# Patient Record
Sex: FEMALE | Race: WHITE | NOT HISPANIC OR LATINO | Employment: OTHER | URBAN - METROPOLITAN AREA
[De-identification: names, ages, dates, MRNs, and addresses within clinical notes are randomized per-mention and may not be internally consistent; named-entity substitution may affect disease eponyms.]

---

## 2017-01-09 ENCOUNTER — TELEPHONE (OUTPATIENT)
Dept: FAMILY MEDICINE | Facility: CLINIC | Age: 82
End: 2017-01-09

## 2017-01-09 DIAGNOSIS — M71.9 BURSITIS: Primary | ICD-10-CM

## 2017-01-09 DIAGNOSIS — G62.9 NEUROPATHY: ICD-10-CM

## 2017-01-10 RX ORDER — ACETAMINOPHEN AND CODEINE PHOSPHATE 300; 30 MG/1; MG/1
1 TABLET ORAL EVERY 8 HOURS PRN
Qty: 270 TABLET | Refills: 0 | Status: SHIPPED | OUTPATIENT
Start: 2017-01-10 | End: 2017-03-31

## 2017-01-10 RX ORDER — GABAPENTIN 300 MG/1
300 CAPSULE ORAL 3 TIMES DAILY
Qty: 180 CAPSULE | Refills: 1 | Status: SHIPPED | OUTPATIENT
Start: 2017-01-10 | End: 2017-05-10

## 2017-01-13 ENCOUNTER — OFFICE VISIT (OUTPATIENT)
Dept: FAMILY MEDICINE | Facility: CLINIC | Age: 82
End: 2017-01-13
Payer: MEDICARE

## 2017-01-13 VITALS
WEIGHT: 135.8 LBS | OXYGEN SATURATION: 98 % | DIASTOLIC BLOOD PRESSURE: 89 MMHG | BODY MASS INDEX: 23.18 KG/M2 | TEMPERATURE: 97.4 F | HEIGHT: 64 IN | SYSTOLIC BLOOD PRESSURE: 136 MMHG | HEART RATE: 65 BPM | RESPIRATION RATE: 15 BRPM

## 2017-01-13 DIAGNOSIS — R73.09 OTHER ABNORMAL GLUCOSE: ICD-10-CM

## 2017-01-13 DIAGNOSIS — I10 ESSENTIAL HYPERTENSION: ICD-10-CM

## 2017-01-13 DIAGNOSIS — Z01.818 PREOP GENERAL PHYSICAL EXAM: Primary | ICD-10-CM

## 2017-01-13 DIAGNOSIS — F41.1 GENERALIZED ANXIETY DISORDER: ICD-10-CM

## 2017-01-13 DIAGNOSIS — G47.00 INSOMNIA, UNSPECIFIED TYPE: ICD-10-CM

## 2017-01-13 DIAGNOSIS — E78.5 HYPERLIPIDEMIA LDL GOAL <130: ICD-10-CM

## 2017-01-13 LAB — HBA1C MFR BLD: 5.3 % (ref 4.3–6)

## 2017-01-13 PROCEDURE — 83036 HEMOGLOBIN GLYCOSYLATED A1C: CPT | Performed by: PHYSICIAN ASSISTANT

## 2017-01-13 PROCEDURE — 80061 LIPID PANEL: CPT | Performed by: PHYSICIAN ASSISTANT

## 2017-01-13 PROCEDURE — 80053 COMPREHEN METABOLIC PANEL: CPT | Performed by: PHYSICIAN ASSISTANT

## 2017-01-13 PROCEDURE — 99215 OFFICE O/P EST HI 40 MIN: CPT | Performed by: PHYSICIAN ASSISTANT

## 2017-01-13 PROCEDURE — 36415 COLL VENOUS BLD VENIPUNCTURE: CPT | Performed by: PHYSICIAN ASSISTANT

## 2017-01-13 RX ORDER — CLONIDINE HYDROCHLORIDE 0.1 MG/1
0.1 TABLET ORAL 2 TIMES DAILY
Qty: 180 TABLET | Refills: 2 | Status: SHIPPED | OUTPATIENT
Start: 2017-01-13 | End: 2017-11-20

## 2017-01-13 RX ORDER — LISINOPRIL 40 MG/1
40 TABLET ORAL DAILY
Qty: 90 TABLET | Refills: 1 | Status: SHIPPED | OUTPATIENT
Start: 2017-01-13 | End: 2017-07-13

## 2017-01-13 RX ORDER — TRAZODONE HYDROCHLORIDE 50 MG/1
50 TABLET, FILM COATED ORAL
Qty: 30 TABLET | Refills: 1 | Status: ON HOLD | OUTPATIENT
Start: 2017-01-13 | End: 2017-05-28

## 2017-01-13 ASSESSMENT — ANXIETY QUESTIONNAIRES
GAD7 TOTAL SCORE: 0
3. WORRYING TOO MUCH ABOUT DIFFERENT THINGS: NOT AT ALL
5. BEING SO RESTLESS THAT IT IS HARD TO SIT STILL: NOT AT ALL
IF YOU CHECKED OFF ANY PROBLEMS ON THIS QUESTIONNAIRE, HOW DIFFICULT HAVE THESE PROBLEMS MADE IT FOR YOU TO DO YOUR WORK, TAKE CARE OF THINGS AT HOME, OR GET ALONG WITH OTHER PEOPLE: NOT DIFFICULT AT ALL
6. BECOMING EASILY ANNOYED OR IRRITABLE: NOT AT ALL
2. NOT BEING ABLE TO STOP OR CONTROL WORRYING: NOT AT ALL
1. FEELING NERVOUS, ANXIOUS, OR ON EDGE: NOT AT ALL
7. FEELING AFRAID AS IF SOMETHING AWFUL MIGHT HAPPEN: NOT AT ALL

## 2017-01-13 ASSESSMENT — PATIENT HEALTH QUESTIONNAIRE - PHQ9: 5. POOR APPETITE OR OVEREATING: NOT AT ALL

## 2017-01-13 NOTE — Clinical Note
[facility]  [id number] [place] [id number] [phone]      January 17, 2017      Daya Ignacio  [address] [facility]    [facility] [address]          Dear Daya,    Terrell cholesterol, hemoglobin A1c (average blood sugars over 3 months, check for diabetes), kidney function, electrolytes, and liver enzymes are normal.     Results for orders placed or performed in visit on 01/13/17   Lipid Profile with reflex to direct LDL   Result Value Ref Range    Cholesterol 186 <200 mg/dL    Triglycerides 94 <150 mg/dL    HDL Cholesterol 57 >49 mg/dL    LDL Cholesterol Calculated 110 (H) <100 mg/dL    Non HDL Cholesterol 129 <130 mg/dL   Comprehensive metabolic panel   Result Value Ref Range    Sodium 137 133 - 144 mmol/L    Potassium 3.4 3.4 - 5.3 mmol/L    Chloride 102 94 - 109 mmol/L    Carbon Dioxide 28 20 - 32 mmol/L    Anion Gap 7 3 - 14 mmol/L    Glucose 106 (H) 70 - 99 mg/dL    Urea Nitrogen 17 7 - 30 mg/dL    Creatinine 0.71 0.52 - 1.04 mg/dL    GFR Estimate 78 >60 mL/min/1.7m2    GFR Estimate If Black >90   GFR Calc   >60 mL/min/1.7m2    Calcium 9.6 8.5 - 10.1 mg/dL    Bilirubin Total 1.4 (H) 0.2 - 1.3 mg/dL    Albumin 4.2 3.4 - 5.0 g/dL    Protein Total 7.8 6.8 - 8.8 g/dL    Alkaline Phosphatase 64 40 - 150 U/L    ALT 14 0 - 50 U/L    AST 10 0 - 45 U/L   Hemoglobin A1c   Result Value Ref Range    Hemoglobin A1C 5.3 4.3 - 6.0 %       Sincerely,  Marcy Soares PA-C

## 2017-01-13 NOTE — MR AVS SNAPSHOT
After Visit Summary   1/13/2017    Daya Ignacio    MRN: [id number]           Patient Information     Date Of Birth          10/11/1930        Visit Information        Provider Department      1/13/2017 10:00 AM Marcy Soares PA-C [facility]        Today's Diagnoses     Preop general physical exam    -  1       Care Instructions      Before Your Surgery      Call your surgeon if there is any change in your health. This includes signs of a cold or flu (such as a sore throat, runny nose, cough, rash or fever).    Do not smoke, drink alcohol or take over the counter medicine (unless your surgeon or primary care doctor tells you to) for the 24 hours before and after surgery.    If you take prescribed drugs: Follow your doctor s orders about which medicines to take and which to stop until after surgery.    Eating and drinking prior to surgery: follow the instructions from your surgeon    Take a shower or bath the night before surgery. Use the soap your surgeon gave you to gently clean your skin. If you do not have soap from your surgeon, use your regular soap. Do not shave or scrub the surgery site.  Wear clean pajamas and have clean sheets on your bed.         Follow-ups after your visit        Who to contact     If you have questions or need follow up information about today's clinic visit or your schedule please contact [facility] directly at [phone].  Normal or non-critical lab and imaging results will be communicated to you by MyChart, letter or phone within 4 business days after the clinic has received the results. If you do not hear from us within 7 days, please contact the clinic through MyChart or phone. If you have a critical or abnormal lab result, we will notify you by phone as soon as possible.  Submit refill requests through [email or web address] or call your pharmacy and they will forward the refill request to us. Please allow 3 business days for your  "refill to be completed.          Additional Information About Your Visit        [employer] Information     [employer] lets you send messages to your doctor, view your test results, renew your prescriptions, schedule appointments and more. To sign up, go to [email or web address] . Click on \"Log in\" on the left side of the screen, which will take you to the Welcome page. Then click on \"Sign up Now\" on the right side of the page.     You will be asked to enter the access code listed below, as well as some personal information. Please follow the directions to create your username and password.     Your access code is: [email or web address]  Expires: 2017 10:03 AM     Your access code will  in 90 days. If you need help or a new code, please call your [place] clinic or [phone].        Care EveryWhere ID     This is your [facility] EveryWhere ID. This could be used by other organizations to access your [place] medical records  [id number]        Your Vitals Were     Pulse Temperature Respirations Height BMI (Body Mass Index) Pulse Oximetry    65 97.4  F (36.3  C) (Oral) 15 5' 4\" (1.626 m) 23.30 kg/m2 98%    Breastfeeding?                   No            Blood Pressure from Last 3 Encounters:   17 136/89   16 139/72   16 130/70    Weight from Last 3 Encounters:   17 135 lb 12.8 oz (61.598 kg)   16 139 lb 12.8 oz (63.413 kg)   16 143 lb (64.864 kg)              Today, you had the following     No orders found for display         Today's Medication Changes          These changes are accurate as of: 17 10:03 AM.  If you have any questions, ask your nurse or doctor.               Stop taking these medicines if you haven't already. Please contact your care team if you have questions.     ciprofloxacin 250 MG tablet   Commonly known as:  CIPRO   Stopped by:  Marcy Soares PA-C           tiZANidine 4 MG tablet   Commonly known as:  ZANAFLEX   Stopped by:  Marcy Soares, " FARZANA           traMADol 50 MG tablet   Commonly known as:  ULTRAM   Stopped by:  Marcy Soares PA-C                    Primary Care Provider Office Phone # Fax #    Marcy Soares PA-C [phone]       [facility] [address]  [facility] [address]        Thank you!     Thank you for choosing [facility]  for your care. Our goal is always to provide you with excellent care. Hearing back from our patients is one way we can continue to improve our services. Please take a few minutes to complete the written survey that you may receive in the mail after your visit with us. Thank you!             Your Updated Medication List - Protect others around you: Learn how to safely use, store and throw away your medicines at www.disposemymeds.org.          This list is accurate as of: 1/13/17 10:03 AM.  Always use your most recent med list.                   Brand Name Dispense Instructions for use    acetaminophen-codeine 300-30 MG per tablet    TYLENOL/codeine #3    270 tablet    Take 1 tablet by mouth every 8 hours as needed for mild pain       ascorbic acid 500 MG tablet    VITAMIN C    100 Tab    take 1 Tab by mouth daily.       cloNIDine 0.1 MG tablet    CATAPRES    180 tablet    Take 1 tablet (0.1 mg) by mouth 2 times daily       conjugated estrogens cream    PREMARIN    30 g    Place 0.5 g vaginally three times a week       estradiol 0.1 MG/GM cream    ESTRACE VAGINAL    42.5 g    Place 2 g vaginally three times a week       fish oil-omega-3 fatty acids 1000 MG capsule     180 Cap    take 2 Caps by mouth daily.       fluocinolone 0.01 % solution    SYNALAR         gabapentin 300 MG capsule    NEURONTIN    180 capsule    Take 1 capsule (300 mg) by mouth 3 times daily       lisinopril 40 MG tablet    PRINIVIL/ZESTRIL    90 tablet    Take 1 tablet (40 mg) by mouth daily       * MEDS UNK - RECORDS REQUESTED      Pressavision - ONE TABLET TWICE DAILY        metoprolol 100 MG 24 hr tablet    TOPROL-XL    90 tablet    Take 1 tablet (100 mg) by mouth daily       * order for DME     1 Device    1 Device daily.       * order for DME     1 Device    4 wheel walker with seat       sertraline 50 MG tablet    ZOLOFT    90 tablet    Take 1 tablet (50 mg) by mouth daily       vitamin D 1000 UNITS capsule     100 Cap    take 1 Cap by mouth daily.       * Notice:  This list has 3 medication(s) that are the same as other medications prescribed for you. Read the directions carefully, and ask your doctor or other care provider to review them with you.

## 2017-01-13 NOTE — PROGRESS NOTES
SUBJECTIVE:                                                    Daya Ignacio is a 86 year old female who presents to clinic today for the following health issues:      Hyperlipidemia Follow-Up      Rate your low fat/cholesterol diet?: good    Taking statin?  No    Other lipid medications/supplements?:  none     Hypertension Follow-up      Outpatient blood pressures are not being checked.    Low Salt Diet: low salt         Diet: regular (no restrictions)  Insomnia      Duration: 2 weeks    Description  Frequency of insomnia:  nightly  Time to fall asleep: 2 hours  Middle of night awakening:  occasionally  Early morning awakening:  occasionally    Accompanying signs and symptoms:  none    History  Similar episodes in past:  YES  Previous evaluation/sleep study:  no     Precipitating or alleviating factors:  New stressful situation: no   Caffeine intake after lunchtime: no   OTC decongestants: no   Any new medications: no     Therapies tried and outcome: caffeine avoidance, Benadryl -  not effective and Advil pm -  not effective           Problem list and histories reviewed & adjusted, as indicated.  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     Hypertension goal BP (blood pressure) < 140/90     Chronic pain syndrome     Past Surgical History   Procedure Laterality Date     C nonspecific procedure       lt hip pain       Social History   Substance Use Topics     Smoking status: Never Smoker      Smokeless tobacco: Never Used     Alcohol Use: No     Family History   Problem Relation Age of Onset     Alzheimer Disease Mother      mild     Cardiovascular Father      heart attack     Neurologic Disorder Brother 83     Parkinson's  "          ROS:  Constitutional, HEENT, cardiovascular, pulmonary, GI, , musculoskeletal, neuro, skin, endocrine and psych systems are negative, except as otherwise noted.    OBJECTIVE:                                                    /89 mmHg  Pulse 65  Temp(Src) 97.4  F (36.3  C) (Oral)  Resp 15  Ht 5' 4\" (1.626 m)  Wt 135 lb 12.8 oz (61.598 kg)  BMI 23.30 kg/m2  SpO2 98%  Breastfeeding? No  Body mass index is 23.3 kg/(m^2).  GENERAL APPEARANCE: healthy, alert and no distress  HENT: ear canals and TM's normal and nose and mouth without ulcers or lesions  RESP: lungs clear to auscultation - no rales, rhonchi or wheezes  CV: regular rates and rhythm, normal S1 S2, no S3 or S4 and no murmur, click or rub  ABDOMEN: soft, nontender, without hepatosplenomegaly or masses and bowel sounds normal  PSYCH: mentation appears normal and affect normal/bright         ASSESSMENT/PLAN:                                                            1. Preop general physical exam      2. Insomnia, unspecified type  Trial of trazodone as needed   - traZODone (DESYREL) 50 MG tablet; Take 1 tablet (50 mg) by mouth nightly as needed for sleep  Dispense: 30 tablet; Refill: 1    3. Hyperlipidemia LDL goal <130    - Lipid Profile with reflex to direct LDL    4. Essential hypertension  Stable.   - Comprehensive metabolic panel  - Hemoglobin A1c  - lisinopril (PRINIVIL/ZESTRIL) 40 MG tablet; Take 1 tablet (40 mg) by mouth daily  Dispense: 90 tablet; Refill: 1  - cloNIDine (CATAPRES) 0.1 MG tablet; Take 1 tablet (0.1 mg) by mouth 2 times daily  Dispense: 180 tablet; Refill: 2    5. Other abnormal glucose     - Hemoglobin A1c    6. GENERALIZED ANXIETY DIS  Stable.   - sertraline (ZOLOFT) 50 MG tablet; Take 1 tablet (50 mg) by mouth daily  Dispense: 90 tablet; Refill: 1        Marcy Soares PA-C, FARZANA  [facility]"

## 2017-01-13 NOTE — PROGRESS NOTES
[facility]  [address] [facility] [address]  [phone]  Dept: [phone]    PRE-OP EVALUATION:  Today's date: 2017    Daya Ignacio (: 10/11/1930) presents for pre-operative evaluation assessment as requested by Dr. Poole.  She requires evaluation and anesthesia risk assessment prior to undergoing surgery/procedure for treatment of Left eye .  Proposed procedure: Cataract, left eye.     Date of Surgery/ Procedure: 2017  Time of Surgery/ Procedure: [facility]/Surgical Facility: [facility]  Fax number for surgical facility: [phone] [facility] Phone: [phone]  Primary Physician: Marcy Soares  Type of Anesthesia Anticipated: to be determined    Patient has a Health Care Directive or Living Will:  NO    1. NO - Do you have a history of heart attack, stroke, stent, bypass or surgery on an artery in the head, neck, heart or legs?  2. NO - Do you ever have any pain or discomfort in your chest?  3. NO - Do you have a history of  Heart Failure?  4. NO - Are you troubled by shortness of breath when: walking on the level, up a slight hill or at night?  5. YES - DO YOU CURRENTLY HAVE A COLD, BRONCHITIS OR OTHER RESPIRATORY INFECTION? Cold  6. NO - Do you have a cough, shortness of breath or wheezing?  7. YES - DO YOU SOMETIMES GET PAINS IN THE CALVES OF YOUR LEGS WHEN YOU WALK?   8. NO - Do you or anyone in your family have previous history of blood clots?  9. NO - Do you or does anyone in your family have a serious bleeding problem such as prolonged bleeding following surgeries or cuts?  10. NO - Have you ever had problems with anemia or been told to take iron pills?  11. NO - Have you had any abnormal blood loss such as black, tarry or bloody stools, or abnormal vaginal bleeding?  12. YES - HAVE YOU EVER HAD A BLOOD TRANSFUSION?6 years ago with hip replacement   13. NO - Have you or any of your relatives  ever had problems with anesthesia?  14. NO - Do you have sleep apnea, excessive snoring or daytime drowsiness?  15. NO - Do you have any prosthetic heart valves?  16. YES - DO YOU HAVE PROSTHETIC JOINTS? Hip  17. NO - Is there any chance that you may be pregnant?      HPI:                                                      Brief HPI related to upcoming procedure: left cataract      HYPERTENSION - Patient has longstanding history of mod-severe HTN , currently denies any symptoms referable to elevated blood pressure. Specifically denies chest pain, palpitations, dyspnea, orthopnea, PND or peripheral edema. Blood pressure readings have been in normal range. Current medication regimen is as listed below. Patient denies any side effects of medication.                                                                                                                                                                                          .    MEDICAL HISTORY:                                                      Patient Active Problem List    Diagnosis Date Noted     Chronic pain syndrome 12/02/2015     Priority: Medium      completed 6/2/16.        Hypertension goal BP (blood pressure) < 140/90 09/25/2015     Priority: Medium     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 04/16/2014     Priority: Medium     HTN (hypertension) 09/24/2012     Vitamin D deficiency 09/04/2012     Advanced directives, counseling/discussion 06/17/2011     Advance Directive Problem List Overview:   Name Relationship Phone    Primary Health Care Agent            Alternative Health Care Agent          Patient states has Advance Directive and will bring in a copy to clinic. 6/17/2011        Hip fracture (H) 02/28/2011     HYPERLIPIDEMIA LDL GOAL <130 10/31/2010     Osteoporosis 09/10/2010     Sciatica 09/10/2010     Generalized anxiety disorder 07/09/2007     describes chronic low lever anxiety/depression        Past Medical History   Diagnosis  Date     Closed fracture of unspecified part of vertebral column without mention of spinal cord injury      from fall summer 06     Elevated blood pressure reading without diagnosis of hypertension      Closed fracture of unspecified part of neck of femur      Macular degeneration (senile) of retina, unspecified      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 4/16/2014     Past Surgical History   Procedure Laterality Date     C nonspecific procedure       lt hip pain     Current Outpatient Prescriptions   Medication Sig Dispense Refill     acetaminophen-codeine (TYLENOL/CODEINE #3) 300-30 MG per tablet Take 1 tablet by mouth every 8 hours as needed for mild pain 270 tablet 0     gabapentin (NEURONTIN) 300 MG capsule Take 1 capsule (300 mg) by mouth 3 times daily 180 capsule 1     metoprolol (TOPROL-XL) 100 MG 24 hr tablet Take 1 tablet (100 mg) by mouth daily 90 tablet 1     estradiol (ESTRACE VAGINAL) 0.1 MG/GM vaginal cream Place 2 g vaginally three times a week 42.5 g 3     sertraline (ZOLOFT) 50 MG tablet Take 1 tablet (50 mg) by mouth daily 90 tablet 1     lisinopril (PRINIVIL,ZESTRIL) 40 MG tablet Take 1 tablet (40 mg) by mouth daily 90 tablet 1     cloNIDine (CATAPRES) 0.1 MG tablet Take 1 tablet (0.1 mg) by mouth 2 times daily 180 tablet 2     traMADol (ULTRAM) 50 MG tablet Take 1 tablet (50 mg) by mouth every 6 hours as needed for moderate pain 30 tablet 0     ciprofloxacin (CIPRO) 250 MG tablet        conjugated estrogens (PREMARIN) vaginal cream Place 0.5 g vaginally three times a week 30 g 6     tiZANidine (ZANAFLEX) 4 MG tablet Take 1 tablet (4 mg) by mouth nightly as needed for muscle spasms 10 tablet 0     fluocinolone (SYNALAR) 0.01 % external solution        ORDER FOR DME 4 wheel walker with seat 1 Device o     ORDER FOR DME 1 Device daily. 1 Device 0     ascorbic acid (VITAMIN C) 500 MG tablet take 1 Tab by mouth daily. 100 Tab 12     Cholecalciferol (VITAMIN D) 1000 UNIT capsule take 1 Cap by  "mouth daily. 100 Cap 12     fish oil-omega-3 fatty acids 1000 MG capsule take 2 Caps by mouth daily. 180 Cap 12     MEDS UNK - RECORDS REQUESTED Pressavision - ONE TABLET TWICE DAILY       OTC products: None, except as noted above    Allergies   Allergen Reactions     Macrobid [Nitrofurantoin Anhydrous] Other (See Comments)     Body aches     Penicillins      Sulfa Drugs       Latex Allergy: NO    Social History   Substance Use Topics     Smoking status: Never Smoker      Smokeless tobacco: Never Used     Alcohol Use: No     History   Drug Use No       REVIEW OF SYSTEMS:                                                    C: NEGATIVE for fever, chills, change in weight  E/M: NEGATIVE for ear, mouth and throat problems  R: NEGATIVE for significant cough or SOB  CV: NEGATIVE for chest pain, palpitations or peripheral edema    EXAM:                                                    /89 mmHg  Pulse 65  Temp(Src) 97.4  F (36.3  C) (Oral)  Resp 15  Ht 5' 4\" (1.626 m)  Wt 135 lb 12.8 oz (61.598 kg)  BMI 23.30 kg/m2  SpO2 98%  Breastfeeding? No  GENERAL APPEARANCE: healthy, alert and no distress  HENT: ear canals and TM's normal and nose and mouth without ulcers or lesions  RESP: lungs clear to auscultation - no rales, rhonchi or wheezes  CV: regular rate and rhythm, normal S1 S2, no S3 or S4 and no murmur, click or rub   ABDOMEN: soft, nontender, no HSM or masses and bowel sounds normal  NEURO: Normal strength and tone, sensory exam grossly normal, mentation intact and speech normal    DIAGNOSTICS:                                                        Recent Labs   Lab Test  01/22/16   1002  09/11/15   1023  08/08/15   0920   08/23/13   0958   06/27/11   0625   06/26/11   0533   HGB   --   12.7   --    --   14.1   < >  9.4*   --   7.9*   PLT   --   150   --    --   159   < >  102*   --   80*   INR   --    --    --    --    --    --   1.21*   --   1.30*   NA  142  141   --    < >  142   < >   --    --    --  "   POTASSIUM  4.2  3.9   --    < >  4.2   < >  3.5   < >   --    CR  0.65  0.60   --    < >  0.58   < >   --    --    --    A1C   --    --   5.2   --    --    --    --    --    --     < > = values in this interval not displayed.        IMPRESSION:                                                    Reason for surgery/procedure: left cataract  Diagnosis/reason for consult: preop    The proposed surgical procedure is considered LOW risk.    REVISED CARDIAC RISK INDEX  The patient has the following serious cardiovascular risks for perioperative complications such as (MI, PE, VFib and 3  AV Block):  No serious cardiac risks  INTERPRETATION: 0 risks: Class I (very low risk - 0.4% complication rate)    The patient has the following additional risks for perioperative complications:  No identified additional risks    No diagnosis found.    RECOMMENDATIONS:                                                          --Patient is to take all blood pressure medications on the day of surgery     APPROVAL GIVEN to proceed with proposed procedure, without further diagnostic evaluation       Signed Electronically by: Marcy Soares PA-C, FARZANA    Copy of this evaluation report is provided to requesting physicianClaudia Gale Preop Guidelines

## 2017-01-14 LAB
ALBUMIN SERPL-MCNC: 4.2 G/DL (ref 3.4–5)
ALP SERPL-CCNC: 64 U/L (ref 40–150)
ALT SERPL W P-5'-P-CCNC: 14 U/L (ref 0–50)
ANION GAP SERPL CALCULATED.3IONS-SCNC: 7 MMOL/L (ref 3–14)
AST SERPL W P-5'-P-CCNC: 10 U/L (ref 0–45)
BILIRUB SERPL-MCNC: 1.4 MG/DL (ref 0.2–1.3)
BUN SERPL-MCNC: 17 MG/DL (ref 7–30)
CALCIUM SERPL-MCNC: 9.6 MG/DL (ref 8.5–10.1)
CHLORIDE SERPL-SCNC: 102 MMOL/L (ref 94–109)
CHOLEST SERPL-MCNC: 186 MG/DL
CO2 SERPL-SCNC: 28 MMOL/L (ref 20–32)
CREAT SERPL-MCNC: 0.71 MG/DL (ref 0.52–1.04)
GFR SERPL CREATININE-BSD FRML MDRD: 78 ML/MIN/1.7M2
GLUCOSE SERPL-MCNC: 106 MG/DL (ref 70–99)
HDLC SERPL-MCNC: 57 MG/DL
LDLC SERPL CALC-MCNC: 110 MG/DL
NONHDLC SERPL-MCNC: 129 MG/DL
POTASSIUM SERPL-SCNC: 3.4 MMOL/L (ref 3.4–5.3)
PROT SERPL-MCNC: 7.8 G/DL (ref 6.8–8.8)
SODIUM SERPL-SCNC: 137 MMOL/L (ref 133–144)
TRIGL SERPL-MCNC: 94 MG/DL

## 2017-01-14 ASSESSMENT — PATIENT HEALTH QUESTIONNAIRE - PHQ9: SUM OF ALL RESPONSES TO PHQ QUESTIONS 1-9: 2

## 2017-01-14 ASSESSMENT — ANXIETY QUESTIONNAIRES: GAD7 TOTAL SCORE: 0

## 2017-03-09 ENCOUNTER — TELEPHONE (OUTPATIENT)
Dept: FAMILY MEDICINE | Facility: CLINIC | Age: 82
End: 2017-03-09

## 2017-03-09 DIAGNOSIS — G89.29 CHRONIC PAIN: ICD-10-CM

## 2017-03-31 ENCOUNTER — TELEPHONE (OUTPATIENT)
Dept: FAMILY MEDICINE | Facility: CLINIC | Age: 82
End: 2017-03-31

## 2017-03-31 DIAGNOSIS — M71.9 BURSITIS: ICD-10-CM

## 2017-03-31 RX ORDER — ACETAMINOPHEN AND CODEINE PHOSPHATE 300; 30 MG/1; MG/1
1 TABLET ORAL EVERY 8 HOURS PRN
Qty: 270 TABLET | Refills: 0 | Status: ON HOLD | OUTPATIENT
Start: 2017-03-31 | End: 2017-06-21

## 2017-03-31 NOTE — TELEPHONE ENCOUNTER
Controlled Substance Refill Request for 01/10/17  Problem List Complete:  Yes    Edited:  Juliano Pena, RN  3/10/2017  Patient is followed by Marcy Soares PA-C, FARZANA for ongoing prescription of pain medication. All refills should only be approved by this provider, or covering partner.     Medication(s): acetaminophen-codeine (TYLENOL/CODEINE #3) 300-30 MG per tablet   Maximum quantity per month: 270  Clinic visit frequency required: Q 6 months      Controlled substance agreement:      Encounter-Level CSA:      There are no encounter-level csa.       Pain Clinic evaluation in the past: No     DIRE Total Score(s):  No flowsheet data found.     Last [facility] website verification: done on 3/10/2017  [email or web address]              checked in past 6 months?  Yes 03/10/17     Thank you,  Deandra Bourgeois  [facility]  [phone]

## 2017-04-14 ENCOUNTER — TRANSFERRED RECORDS (OUTPATIENT)
Dept: HEALTH INFORMATION MANAGEMENT | Facility: CLINIC | Age: 82
End: 2017-04-14

## 2017-05-10 DIAGNOSIS — G62.9 NEUROPATHY: ICD-10-CM

## 2017-05-10 RX ORDER — GABAPENTIN 300 MG/1
CAPSULE ORAL
Qty: 180 CAPSULE | Refills: 1 | Status: SHIPPED | OUTPATIENT
Start: 2017-05-10 | End: 2017-09-22

## 2017-05-10 NOTE — TELEPHONE ENCOUNTER
gabapentin (NEURONTIN) 300 MG capsule      Sig: Take 1 capsule (300 mg) by mouth 3 times daily    Last Written Prescription Date: 1/10/17  Last Quantity: 180, # refills: 1  Last Office Visit with [facility] or [facility] prescribing provider: 1/13/2017       Creatinine   Date Value Ref Range Status   01/13/2017 0.71 0.52 - 1.04 mg/dL Final     Lab Results   Component Value Date    AST 10 01/13/2017     Lab Results   Component Value Date    ALT 14 01/13/2017     BP Readings from Last 3 Encounters:   01/13/17 136/89   07/08/16 139/72   01/22/16 130/70         Routing refill request to provider for review/approval because:  Drug not on the [facility], [facility] or [facility] refill protocol or controlled substance    LUIS ANTONIO Logan  May 10, 2017  1:24 PM

## 2017-05-28 ENCOUNTER — APPOINTMENT (OUTPATIENT)
Dept: GENERAL RADIOLOGY | Facility: CLINIC | Age: 82
DRG: 281 | End: 2017-05-28
Attending: EMERGENCY MEDICINE
Payer: MEDICARE

## 2017-05-28 ENCOUNTER — HOSPITAL ENCOUNTER (INPATIENT)
Facility: CLINIC | Age: 82
LOS: 4 days | Discharge: NURSING, REHAB OR LONG-TERM CARE | DRG: 281 | End: 2017-06-01
Attending: EMERGENCY MEDICINE | Admitting: INTERNAL MEDICINE
Payer: MEDICARE

## 2017-05-28 ENCOUNTER — APPOINTMENT (OUTPATIENT)
Dept: CT IMAGING | Facility: CLINIC | Age: 82
DRG: 281 | End: 2017-05-28
Attending: EMERGENCY MEDICINE
Payer: MEDICARE

## 2017-05-28 DIAGNOSIS — I25.10 ASHD (ARTERIOSCLEROTIC HEART DISEASE): ICD-10-CM
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
## 2017-05-28 DIAGNOSIS — I21.4 NON-STEMI (NON-ST ELEVATED MYOCARDIAL INFARCTION) (H): ICD-10-CM

## 2017-05-28 DIAGNOSIS — I10 HYPERTENSION GOAL BP (BLOOD PRESSURE) < 140/90: Primary | ICD-10-CM

## 2017-05-28 DIAGNOSIS — N30.00 ACUTE CYSTITIS WITHOUT HEMATURIA: ICD-10-CM

## 2017-05-28 DIAGNOSIS — N30.01 ACUTE CYSTITIS WITH HEMATURIA: ICD-10-CM

## 2017-05-28 DIAGNOSIS — E87.6 HYPOKALEMIA: ICD-10-CM

## 2017-05-28 LAB
ALBUMIN SERPL-MCNC: 3.6 G/DL (ref 3.4–5)
ALBUMIN UR-MCNC: 30 MG/DL
ALP SERPL-CCNC: 69 U/L (ref 40–150)
ALT SERPL W P-5'-P-CCNC: 18 U/L (ref 0–50)
ANION GAP SERPL CALCULATED.3IONS-SCNC: 9 MMOL/L (ref 3–14)
APPEARANCE UR: ABNORMAL
AST SERPL W P-5'-P-CCNC: 33 U/L (ref 0–45)
BACTERIA #/AREA URNS HPF: ABNORMAL /HPF
BASOPHILS # BLD AUTO: 0 10E9/L (ref 0–0.2)
BASOPHILS NFR BLD AUTO: 0.1 %
BILIRUB SERPL-MCNC: 1.6 MG/DL (ref 0.2–1.3)
BILIRUB UR QL STRIP: NEGATIVE
BUN SERPL-MCNC: 17 MG/DL (ref 7–30)
CALCIUM SERPL-MCNC: 9 MG/DL (ref 8.5–10.1)
CHLORIDE SERPL-SCNC: 100 MMOL/L (ref 94–109)
CO2 SERPL-SCNC: 27 MMOL/L (ref 20–32)
COLOR UR AUTO: YELLOW
CREAT SERPL-MCNC: 0.6 MG/DL (ref 0.52–1.04)
DIFFERENTIAL METHOD BLD: ABNORMAL
EOSINOPHIL # BLD AUTO: 0 10E9/L (ref 0–0.7)
EOSINOPHIL NFR BLD AUTO: 0 %
ERYTHROCYTE [DISTWIDTH] IN BLOOD BY AUTOMATED COUNT: 13.9 % (ref 10–15)
GFR SERPL CREATININE-BSD FRML MDRD: ABNORMAL ML/MIN/1.7M2
GLUCOSE SERPL-MCNC: 114 MG/DL (ref 70–99)
GLUCOSE UR STRIP-MCNC: NEGATIVE MG/DL
HCT VFR BLD AUTO: 41.6 % (ref 35–47)
HGB BLD-MCNC: 13.8 G/DL (ref 11.7–15.7)
HGB UR QL STRIP: ABNORMAL
IMM GRANULOCYTES # BLD: 0.1 10E9/L (ref 0–0.4)
IMM GRANULOCYTES NFR BLD: 0.8 %
KETONES UR STRIP-MCNC: 5 MG/DL
LACTATE BLD-SCNC: 1.3 MMOL/L (ref 0.7–2.1)
LEUKOCYTE ESTERASE UR QL STRIP: ABNORMAL
LMWH PPP CHRO-ACNC: 0.25 IU/ML
LYMPHOCYTES # BLD AUTO: 0.5 10E9/L (ref 0.8–5.3)
LYMPHOCYTES NFR BLD AUTO: 4.6 %
MAGNESIUM SERPL-MCNC: 1.9 MG/DL (ref 1.6–2.3)
MCH RBC QN AUTO: 30.1 PG (ref 26.5–33)
MCHC RBC AUTO-ENTMCNC: 33.2 G/DL (ref 31.5–36.5)
MCV RBC AUTO: 91 FL (ref 78–100)
MONOCYTES # BLD AUTO: 0.7 10E9/L (ref 0–1.3)
MONOCYTES NFR BLD AUTO: 5.9 %
MUCOUS THREADS #/AREA URNS LPF: PRESENT /LPF
NEUTROPHILS # BLD AUTO: 9.7 10E9/L (ref 1.6–8.3)
NEUTROPHILS NFR BLD AUTO: 88.6 %
NITRATE UR QL: POSITIVE
NRBC # BLD AUTO: 0 10*3/UL
NRBC BLD AUTO-RTO: 0 /100
PH UR STRIP: 5 PH (ref 5–7)
PLATELET # BLD AUTO: 114 10E9/L (ref 150–450)
POTASSIUM SERPL-SCNC: 3.1 MMOL/L (ref 3.4–5.3)
POTASSIUM SERPL-SCNC: 3.6 MMOL/L (ref 3.4–5.3)
PROCALCITONIN SERPL-MCNC: 3.17 NG/ML
PROT SERPL-MCNC: 7.3 G/DL (ref 6.8–8.8)
RBC # BLD AUTO: 4.58 10E12/L (ref 3.8–5.2)
RBC #/AREA URNS AUTO: 17 /HPF (ref 0–2)
SODIUM SERPL-SCNC: 136 MMOL/L (ref 133–144)
SP GR UR STRIP: 1.01 (ref 1–1.03)
SQUAMOUS #/AREA URNS AUTO: 1 /HPF (ref 0–1)
TROPONIN I SERPL-MCNC: 2.99 UG/L (ref 0–0.04)
TROPONIN I SERPL-MCNC: 3.79 UG/L (ref 0–0.04)
TROPONIN I SERPL-MCNC: 3.87 UG/L (ref 0–0.04)
URN SPEC COLLECT METH UR: ABNORMAL
UROBILINOGEN UR STRIP-MCNC: 0 MG/DL (ref 0–2)
WBC # BLD AUTO: 11 10E9/L (ref 4–11)
WBC #/AREA URNS AUTO: >182 /HPF (ref 0–2)

## 2017-05-28 PROCEDURE — 25000132 ZZH RX MED GY IP 250 OP 250 PS 637: Mod: GY | Performed by: EMERGENCY MEDICINE

## 2017-05-28 PROCEDURE — 83605 ASSAY OF LACTIC ACID: CPT | Performed by: EMERGENCY MEDICINE

## 2017-05-28 PROCEDURE — 85025 COMPLETE CBC W/AUTO DIFF WBC: CPT | Performed by: EMERGENCY MEDICINE

## 2017-05-28 PROCEDURE — A9270 NON-COVERED ITEM OR SERVICE: HCPCS | Mod: GY | Performed by: EMERGENCY MEDICINE

## 2017-05-28 PROCEDURE — 87086 URINE CULTURE/COLONY COUNT: CPT | Performed by: EMERGENCY MEDICINE

## 2017-05-28 PROCEDURE — 87040 BLOOD CULTURE FOR BACTERIA: CPT | Performed by: INTERNAL MEDICINE

## 2017-05-28 PROCEDURE — 84484 ASSAY OF TROPONIN QUANT: CPT | Performed by: EMERGENCY MEDICINE

## 2017-05-28 PROCEDURE — 25000125 ZZHC RX 250: Performed by: INTERNAL MEDICINE

## 2017-05-28 PROCEDURE — 85520 HEPARIN ASSAY: CPT | Performed by: INTERNAL MEDICINE

## 2017-05-28 PROCEDURE — 83735 ASSAY OF MAGNESIUM: CPT | Performed by: EMERGENCY MEDICINE

## 2017-05-28 PROCEDURE — 96376 TX/PRO/DX INJ SAME DRUG ADON: CPT

## 2017-05-28 PROCEDURE — 96361 HYDRATE IV INFUSION ADD-ON: CPT

## 2017-05-28 PROCEDURE — 25000128 H RX IP 250 OP 636

## 2017-05-28 PROCEDURE — 96365 THER/PROPH/DIAG IV INF INIT: CPT

## 2017-05-28 PROCEDURE — 12000007 ZZH R&B INTERMEDIATE

## 2017-05-28 PROCEDURE — 36415 COLL VENOUS BLD VENIPUNCTURE: CPT | Performed by: INTERNAL MEDICINE

## 2017-05-28 PROCEDURE — 25000128 H RX IP 250 OP 636: Performed by: EMERGENCY MEDICINE

## 2017-05-28 PROCEDURE — 93005 ELECTROCARDIOGRAM TRACING: CPT

## 2017-05-28 PROCEDURE — 96375 TX/PRO/DX INJ NEW DRUG ADDON: CPT

## 2017-05-28 PROCEDURE — 84484 ASSAY OF TROPONIN QUANT: CPT | Performed by: INTERNAL MEDICINE

## 2017-05-28 PROCEDURE — 25000128 H RX IP 250 OP 636: Performed by: INTERNAL MEDICINE

## 2017-05-28 PROCEDURE — A9270 NON-COVERED ITEM OR SERVICE: HCPCS | Mod: GY | Performed by: INTERNAL MEDICINE

## 2017-05-28 PROCEDURE — 72125 CT NECK SPINE W/O DYE: CPT

## 2017-05-28 PROCEDURE — 87186 SC STD MICRODIL/AGAR DIL: CPT | Performed by: EMERGENCY MEDICINE

## 2017-05-28 PROCEDURE — 25000132 ZZH RX MED GY IP 250 OP 250 PS 637: Mod: GY | Performed by: INTERNAL MEDICINE

## 2017-05-28 PROCEDURE — 80053 COMPREHEN METABOLIC PANEL: CPT | Performed by: EMERGENCY MEDICINE

## 2017-05-28 PROCEDURE — 87088 URINE BACTERIA CULTURE: CPT | Performed by: EMERGENCY MEDICINE

## 2017-05-28 PROCEDURE — 99223 1ST HOSP IP/OBS HIGH 75: CPT | Mod: AI | Performed by: INTERNAL MEDICINE

## 2017-05-28 PROCEDURE — 81001 URINALYSIS AUTO W/SCOPE: CPT | Performed by: EMERGENCY MEDICINE

## 2017-05-28 PROCEDURE — 84145 PROCALCITONIN (PCT): CPT | Performed by: INTERNAL MEDICINE

## 2017-05-28 PROCEDURE — 71020 XR CHEST 2 VW: CPT

## 2017-05-28 PROCEDURE — 70450 CT HEAD/BRAIN W/O DYE: CPT

## 2017-05-28 PROCEDURE — 84145 PROCALCITONIN (PCT): CPT | Performed by: EMERGENCY MEDICINE

## 2017-05-28 PROCEDURE — 99285 EMERGENCY DEPT VISIT HI MDM: CPT | Mod: 25

## 2017-05-28 PROCEDURE — 36415 COLL VENOUS BLD VENIPUNCTURE: CPT | Performed by: EMERGENCY MEDICINE

## 2017-05-28 PROCEDURE — 84132 ASSAY OF SERUM POTASSIUM: CPT | Performed by: INTERNAL MEDICINE

## 2017-05-28 RX ORDER — METOPROLOL SUCCINATE 100 MG/1
100 TABLET, EXTENDED RELEASE ORAL DAILY
Status: DISCONTINUED | OUTPATIENT
Start: 2017-05-28 | End: 2017-05-30

## 2017-05-28 RX ORDER — POTASSIUM CHLORIDE 7.45 MG/ML
10 INJECTION INTRAVENOUS
Status: DISCONTINUED | OUTPATIENT
Start: 2017-05-28 | End: 2017-06-01 | Stop reason: HOSPADM

## 2017-05-28 RX ORDER — SODIUM CHLORIDE 9 MG/ML
1000 INJECTION, SOLUTION INTRAVENOUS CONTINUOUS
Status: DISCONTINUED | OUTPATIENT
Start: 2017-05-28 | End: 2017-05-28

## 2017-05-28 RX ORDER — CEFTRIAXONE 1 G/1
1 INJECTION, POWDER, FOR SOLUTION INTRAMUSCULAR; INTRAVENOUS EVERY 24 HOURS
Status: DISCONTINUED | OUTPATIENT
Start: 2017-05-28 | End: 2017-06-01 | Stop reason: HOSPADM

## 2017-05-28 RX ORDER — ONDANSETRON 2 MG/ML
4 INJECTION INTRAMUSCULAR; INTRAVENOUS ONCE
Status: COMPLETED | OUTPATIENT
Start: 2017-05-28 | End: 2017-05-28

## 2017-05-28 RX ORDER — CEFDINIR 300 MG/1
300 CAPSULE ORAL ONCE
Status: DISCONTINUED | OUTPATIENT
Start: 2017-05-28 | End: 2017-05-28

## 2017-05-28 RX ORDER — POTASSIUM CHLORIDE 1500 MG/1
20-40 TABLET, EXTENDED RELEASE ORAL
Status: DISCONTINUED | OUTPATIENT
Start: 2017-05-28 | End: 2017-06-01 | Stop reason: HOSPADM

## 2017-05-28 RX ORDER — ONDANSETRON 2 MG/ML
INJECTION INTRAMUSCULAR; INTRAVENOUS
Status: COMPLETED
Start: 2017-05-28 | End: 2017-05-28

## 2017-05-28 RX ORDER — NALOXONE HYDROCHLORIDE 0.4 MG/ML
.1-.4 INJECTION, SOLUTION INTRAMUSCULAR; INTRAVENOUS; SUBCUTANEOUS
Status: DISCONTINUED | OUTPATIENT
Start: 2017-05-28 | End: 2017-06-01 | Stop reason: HOSPADM

## 2017-05-28 RX ORDER — MAGNESIUM SULFATE HEPTAHYDRATE 40 MG/ML
4 INJECTION, SOLUTION INTRAVENOUS EVERY 4 HOURS PRN
Status: DISCONTINUED | OUTPATIENT
Start: 2017-05-28 | End: 2017-06-01 | Stop reason: HOSPADM

## 2017-05-28 RX ORDER — ASPIRIN 81 MG/1
324 TABLET, CHEWABLE ORAL ONCE
Status: COMPLETED | OUTPATIENT
Start: 2017-05-28 | End: 2017-05-28

## 2017-05-28 RX ORDER — ACETAMINOPHEN 325 MG/1
650 TABLET ORAL ONCE
Status: COMPLETED | OUTPATIENT
Start: 2017-05-28 | End: 2017-05-28

## 2017-05-28 RX ORDER — ACETAMINOPHEN 325 MG/1
650 TABLET ORAL EVERY 6 HOURS PRN
Status: DISCONTINUED | OUTPATIENT
Start: 2017-05-28 | End: 2017-06-01 | Stop reason: HOSPADM

## 2017-05-28 RX ORDER — POTASSIUM CHLORIDE 1.5 G/1.58G
40 POWDER, FOR SOLUTION ORAL ONCE
Status: COMPLETED | OUTPATIENT
Start: 2017-05-28 | End: 2017-05-28

## 2017-05-28 RX ORDER — ONDANSETRON 4 MG/1
4 TABLET, ORALLY DISINTEGRATING ORAL EVERY 6 HOURS PRN
Status: DISCONTINUED | OUTPATIENT
Start: 2017-05-28 | End: 2017-06-01 | Stop reason: HOSPADM

## 2017-05-28 RX ORDER — POTASSIUM CL/LIDO/0.9 % NACL 10MEQ/0.1L
10 INTRAVENOUS SOLUTION, PIGGYBACK (ML) INTRAVENOUS
Status: DISCONTINUED | OUTPATIENT
Start: 2017-05-28 | End: 2017-06-01 | Stop reason: HOSPADM

## 2017-05-28 RX ORDER — ISOSORBIDE DINITRATE 10 MG/1
10 TABLET ORAL 3 TIMES DAILY
Status: DISCONTINUED | OUTPATIENT
Start: 2017-05-28 | End: 2017-06-01 | Stop reason: HOSPADM

## 2017-05-28 RX ORDER — POTASSIUM CHLORIDE 29.8 MG/ML
20 INJECTION INTRAVENOUS
Status: DISCONTINUED | OUTPATIENT
Start: 2017-05-28 | End: 2017-06-01 | Stop reason: HOSPADM

## 2017-05-28 RX ORDER — METOPROLOL TARTRATE 1 MG/ML
5 INJECTION, SOLUTION INTRAVENOUS ONCE
Status: DISCONTINUED | OUTPATIENT
Start: 2017-05-28 | End: 2017-05-28

## 2017-05-28 RX ORDER — POTASSIUM CHLORIDE 1.5 G/1.58G
20-40 POWDER, FOR SOLUTION ORAL
Status: DISCONTINUED | OUTPATIENT
Start: 2017-05-28 | End: 2017-06-01 | Stop reason: HOSPADM

## 2017-05-28 RX ORDER — ONDANSETRON 2 MG/ML
4 INJECTION INTRAMUSCULAR; INTRAVENOUS EVERY 6 HOURS PRN
Status: DISCONTINUED | OUTPATIENT
Start: 2017-05-28 | End: 2017-06-01 | Stop reason: HOSPADM

## 2017-05-28 RX ORDER — NITROGLYCERIN 0.4 MG/1
0.4 TABLET SUBLINGUAL EVERY 5 MIN PRN
Status: DISCONTINUED | OUTPATIENT
Start: 2017-05-28 | End: 2017-05-28

## 2017-05-28 RX ADMIN — Medication 3350 UNITS: at 15:39

## 2017-05-28 RX ADMIN — ISOSORBIDE DINITRATE 10 MG: 10 TABLET ORAL at 17:39

## 2017-05-28 RX ADMIN — POTASSIUM CHLORIDE 20 MEQ: 1.5 POWDER, FOR SOLUTION ORAL at 17:39

## 2017-05-28 RX ADMIN — ACETAMINOPHEN 650 MG: 325 TABLET, FILM COATED ORAL at 13:00

## 2017-05-28 RX ADMIN — ONDANSETRON 4 MG: 4 TABLET, ORALLY DISINTEGRATING ORAL at 22:41

## 2017-05-28 RX ADMIN — CEFTRIAXONE SODIUM 1 G: 1 INJECTION, POWDER, FOR SOLUTION INTRAMUSCULAR; INTRAVENOUS at 17:39

## 2017-05-28 RX ADMIN — ISOSORBIDE DINITRATE 10 MG: 10 TABLET ORAL at 22:11

## 2017-05-28 RX ADMIN — NITROGLYCERIN 0.4 MG: 0.4 TABLET SUBLINGUAL at 13:48

## 2017-05-28 RX ADMIN — HEPARIN SODIUM 12 UNITS/KG/HR: 10000 INJECTION, SOLUTION INTRAVENOUS at 15:39

## 2017-05-28 RX ADMIN — ASPIRIN 81 MG CHEWABLE TABLET 324 MG: 81 TABLET CHEWABLE at 14:38

## 2017-05-28 RX ADMIN — SODIUM CHLORIDE 1000 ML: 9 INJECTION, SOLUTION INTRAVENOUS at 12:23

## 2017-05-28 RX ADMIN — ONDANSETRON 4 MG: 2 INJECTION INTRAMUSCULAR; INTRAVENOUS at 12:21

## 2017-05-28 RX ADMIN — METOPROLOL SUCCINATE 100 MG: 100 TABLET, EXTENDED RELEASE ORAL at 17:39

## 2017-05-28 RX ADMIN — POTASSIUM CHLORIDE 40 MEQ: 1.5 POWDER, FOR SOLUTION ORAL at 13:45

## 2017-05-28 ASSESSMENT — ENCOUNTER SYMPTOMS
APPETITE CHANGE: 1
FEVER: 0
NECK PAIN: 1
VOMITING: 1
ABDOMINAL PAIN: 0
DIZZINESS: 0
CHILLS: 0
COUGH: 0

## 2017-05-28 ASSESSMENT — PAIN DESCRIPTION - DESCRIPTORS
DESCRIPTORS: HEADACHE

## 2017-05-28 NOTE — ED NOTES
Patient states that she has been ill the past 24 hours with nausea, frequent vomiting, and chills.  Further patient felt she was weak.  Patient fell in her bathroom this morning at 0900; patient did crawl to her telephone prior to calling the medics.  C-Collar removed by   Dr. Pepper after assessment.  ABCs intact.

## 2017-05-28 NOTE — PHARMACY-ADMISSION MEDICATION HISTORY
Admission medication history interview status for this patient is complete. See [facility] admission navigator for allergy information, prior to admission medications and immunization status.     Medication history interview source(s):Patient  Medication history resources (including written lists, pill bottles, clinic record):None    Changes made to PTA medication list:  Added: none  Deleted: estradiol vaginal cream, trazodone  Changed: none    Actions taken by pharmacist (provider contacted, etc):None     Additional medication history information:None    Medication reconciliation/reorder completed by provider prior to medication history? No    For patients on insulin therapy: no (Yes/No)   Lantus/levemir/NPH/Mix 70/30 dose: _____ in AM/PM or twice daily   Sliding scale Novolog Y/N   If Yes, do you have a baseline novolog pre-meal dose: ______units with meals   Patients eat three meals a day: Y/N   Any Barriers to therapy: cost of medications/comfortable with giving injections (if applicable)/ comfortable and confident with current diabetes regimen       Prior to Admission medications    Medication Sig Last Dose Taking? Auth Provider   gabapentin (NEURONTIN) 300 MG capsule TAKE ONE CAPSULE BY MOUTH THREE TIMES A DAY 5/27/2017 at Unknown time Yes Marcy Soares PA-C   acetaminophen-codeine (TYLENOL/CODEINE #3) 300-30 MG per tablet Take 1 tablet by mouth every 8 hours as needed for mild pain 5/27/2017 at Unknown time Yes Marcy Soares PA-C   sertraline (ZOLOFT) 50 MG tablet Take 1 tablet (50 mg) by mouth daily 5/27/2017 at Unknown time Yes Marcy Soares PA-C   lisinopril (PRINIVIL/ZESTRIL) 40 MG tablet Take 1 tablet (40 mg) by mouth daily 5/27/2017 at Unknown time Yes Marcy Soares PA-C   cloNIDine (CATAPRES) 0.1 MG tablet Take 1 tablet (0.1 mg) by mouth 2 times daily 5/27/2017 at Unknown time Yes Marcy Soares PA-C   metoprolol (TOPROL-XL) 100 MG 24 hr tablet Take 1  tablet (100 mg) by mouth daily 5/27/2017 at Unknown time Yes Marcy Soares PA-C   conjugated estrogens (PREMARIN) vaginal cream Place 0.5 g vaginally three times a week Past Week at fri Yes Juan Manuel Cabalelro MD   ascorbic acid (VITAMIN C) 500 MG tablet take 1 Tab by mouth daily. 5/27/2017 at Unknown time Yes Christie Mancilla MD   Cholecalciferol (VITAMIN D) 1000 UNIT capsule take 1 Cap by mouth daily. 5/27/2017 at Unknown time Yes Christie Mancilla MD   fish oil-omega-3 fatty acids 1000 MG capsule take 2 Caps by mouth daily. 5/27/2017 at Unknown time Yes Christie Mancilla MD

## 2017-05-28 NOTE — IP AVS SNAPSHOT
MRN:[id number]                      After Visit Summary   5/28/2017    Daya Ignacio    MRN: [id number]           Thank you!     Thank you for choosing [facility] for your care. Our goal is always to provide you with excellent care. Hearing back from our patients is one way we can continue to improve our services. Please take a few minutes to complete the written survey that you may receive in the mail after you visit. If you would like to speak to someone directly about your visit please contact Patient Relations at [phone]. Thank you!          Patient Information     Date Of Birth          10/11/1930        Designated Caregiver       Most Recent Value    Caregiver    Will someone help with your care after discharge? no      About your hospital stay     You were admitted on:  May 28, 2017 You last received care in the:  [place] [address] [facility]    You were discharged on:  June 1, 2017        Reason for your hospital stay       UTI and non ST elevation myocardial infarction (type 2)                  Who to Call     For medical emergencies, please call 911.  For non-urgent questions about your medical care, please call your primary care provider or clinic, [phone]          Attending Provider     Provider Specialty    Evelyne Mehta MD Emergency Medicine    Shaan Harden MD Internal Medicine       Primary Care Provider Office Phone # Fax #    Marcy Natalia Soares PA-C [phone]      After Care Instructions     Activity       Your activity upon discharge: activity as tolerated            Diet       Follow this diet upon discharge: Cardiac                  Follow-up Appointments     Follow-up and recommended labs and tests        Follow up with PCP in 1 week                  Additional Services     Follow-Up with Cardiac Advanced Practice Provider           Follow-Up with Cardiologist                 Pending Results     Date and Time Order  "Name Status Description    2017 1656 Blood culture Preliminary             Statement of Approval     Ordered          17 1043  I have reviewed and agree with all the recommendations and orders detailed in this document.  EFFECTIVE NOW     Approved and electronically signed by:  Andre Mcmanus MD             Admission Information     Date & Time Provider Department Dept. Phone    2017 Shaan Harden MD [place] [address] [facility] [phone]      Your Vitals Were     Blood Pressure Pulse Temperature Respirations Height Weight    148/71 (BP Location: Left arm) 91 97.9  F (36.6  C) (Oral) 20 1.626 m (5' 4\") 57.3 kg (126 lb 4.8 oz)    Pulse Oximetry BMI (Body Mass Index)                96% 21.68 kg/m2          MyChart Information     [employer] lets you send messages to your doctor, view your test results, renew your prescriptions, schedule appointments and more. To sign up, go to [email or web address] . Click on \"Log in\" on the left side of the screen, which will take you to the Welcome page. Then click on \"Sign up Now\" on the right side of the page.     You will be asked to enter the access code listed below, as well as some personal information. Please follow the directions to create your username and password.     Your access code is: [email or web address]  Expires: 2017 10:49 AM     Your access code will  in 90 days. If you need help or a new code, please call your [place] [facility] clinic or [phone].        Care EveryWhere ID     This is your Care EveryWhere ID. This could be used by other organizations to access your [facility] medical records  [id number]           Review of your medicines      START taking        Dose / Directions    amLODIPine 10 MG tablet   Commonly known as:  NORVASC   Used for:  Hypertension goal BP (blood pressure) < 140/90   Notes to Patient:  New medication for high blood pressure        Dose:  10 mg   Take 1 tablet (10 mg) by mouth daily   Quantity:  30 tablet "   Refills:  3       aspirin 81 MG EC tablet   Used for:  Non-STEMI (non-ST elevated myocardial infarction) (H)        Dose:  81 mg   Take 1 tablet (81 mg) by mouth daily   Quantity:  30 tablet   Refills:  3       atorvastatin 10 MG tablet   Commonly known as:  LIPITOR   Used for:  Non-STEMI (non-ST elevated myocardial infarction) (H)        Dose:  10 mg   Take 1 tablet (10 mg) by mouth daily   Quantity:  30 tablet   Refills:  3       cefadroxil 500 MG capsule   Commonly known as:  DURICEF   Notes to Patient:  Antibiotic for urinary tract infection        Dose:  500 mg   Take 1 capsule (500 mg) by mouth 2 times daily for 6 days   Quantity:  12 capsule   Refills:  0       isosorbide dinitrate 10 MG tablet   Commonly known as:  ISORDIL   Used for:  Non-STEMI (non-ST elevated myocardial infarction) (H)        Dose:  10 mg   Take 1 tablet (10 mg) by mouth 3 times daily   Quantity:  90 tablet   Refills:  3         CONTINUE these medicines which have NOT CHANGED        Dose / Directions    acetaminophen-codeine 300-30 MG per tablet   Commonly known as:  TYLENOL/codeine #3   Used for:  Bursitis        Dose:  1 tablet   Take 1 tablet by mouth every 8 hours as needed for mild pain   Quantity:  270 tablet   Refills:  0       ascorbic acid 500 MG tablet   Commonly known as:  VITAMIN C   Notes to Patient:  Take as you were prior to admission        Dose:  500 mg   take 1 Tab by mouth daily.   Quantity:  100 Tab   Refills:  12       cloNIDine 0.1 MG tablet   Commonly known as:  CATAPRES   Used for:  Essential hypertension        Dose:  0.1 mg   Take 1 tablet (0.1 mg) by mouth 2 times daily   Quantity:  180 tablet   Refills:  2       conjugated estrogens cream   Commonly known as:  PREMARIN   Used for:  Other urinary problems        Dose:  0.5 g   Place 0.5 g vaginally three times a week   Quantity:  30 g   Refills:  6       fish oil-omega-3 fatty acids 1000 MG capsule        Dose:  2 g   take 2 Caps by mouth daily.   Quantity:   180 Cap   Refills:  12       gabapentin 300 MG capsule   Commonly known as:  NEURONTIN   Used for:  Neuropathy (H)   Notes to Patient:  Take as you were prior to admission        TAKE ONE CAPSULE BY MOUTH THREE TIMES A DAY   Quantity:  180 capsule   Refills:  1       lisinopril 40 MG tablet   Commonly known as:  PRINIVIL/ZESTRIL   Used for:  Essential hypertension        Dose:  40 mg   Take 1 tablet (40 mg) by mouth daily   Quantity:  90 tablet   Refills:  1       metoprolol 100 MG 24 hr tablet   Commonly known as:  TOPROL-XL   Used for:  HTN (hypertension), benign        Dose:  100 mg   Take 1 tablet (100 mg) by mouth daily   Quantity:  90 tablet   Refills:  1       sertraline 50 MG tablet   Commonly known as:  ZOLOFT   Used for:  Generalized anxiety disorder   Notes to Patient:  Take as you were prior to admission        Dose:  50 mg   Take 1 tablet (50 mg) by mouth daily   Quantity:  90 tablet   Refills:  1       vitamin D 1000 UNITS capsule        Dose:  1 capsule   take 1 Cap by mouth daily.   Quantity:  100 Cap   Refills:  12            Where to get your medicines      These medications were sent to [address] [address]     Phone:  [phone]     amLODIPine 10 MG tablet    aspirin 81 MG EC tablet    atorvastatin 10 MG tablet    cefadroxil 500 MG capsule    isosorbide dinitrate 10 MG tablet                Protect others around you: Learn how to safely use, store and throw away your medicines at www.disposemymeds.org.             Medication List: This is a list of all your medications and when to take them. Check marks below indicate your daily home schedule. Keep this list as a reference.      Medications           Morning Afternoon Evening Bedtime As Needed    acetaminophen-codeine 300-30 MG per tablet   Commonly known as:  TYLENOL/codeine #3   Take 1 tablet by mouth every 8 hours as needed for mild pain                                 amLODIPine 10 MG tablet   Commonly known as:  NORVASC   Take 1 tablet (10 mg) by mouth daily   Last time this was given:  10 mg on 6/1/2017  8:22 AM   Next Dose Due:  Start tomorrow morning 6/2   Notes to Patient:  New medication for high blood pressure                                ascorbic acid 500 MG tablet   Commonly known as:  VITAMIN C   take 1 Tab by mouth daily.   Notes to Patient:  Take as you were prior to admission                                aspirin 81 MG EC tablet   Take 1 tablet (81 mg) by mouth daily   Last time this was given:  81 mg on 6/1/2017  8:22 AM   Next Dose Due:  Start tomorrow am 6/2                                atorvastatin 10 MG tablet   Commonly known as:  LIPITOR   Take 1 tablet (10 mg) by mouth daily   Last time this was given:  10 mg on 6/1/2017  8:21 AM   Next Dose Due:  Start tomorrow am 6/2                                cefadroxil 500 MG capsule   Commonly known as:  DURICEF   Take 1 capsule (500 mg) by mouth 2 times daily for 6 days   Next Dose Due:  Start this evening then start tomorrow one in the morning and one in evening   Notes to Patient:  Antibiotic for urinary tract infection                                cloNIDine 0.1 MG tablet   Commonly known as:  CATAPRES   Take 1 tablet (0.1 mg) by mouth 2 times daily   Last time this was given:  0.1 mg on 6/1/2017  8:22 AM   Next Dose Due:  This evening                                conjugated estrogens cream   Commonly known as:  PREMARIN   Place 0.5 g vaginally three times a week                                fish oil-omega-3 fatty acids 1000 MG capsule   take 2 Caps by mouth daily.                                gabapentin 300 MG capsule   Commonly known as:  NEURONTIN   TAKE ONE CAPSULE BY MOUTH THREE TIMES A DAY   Notes to Patient:  Take as you were prior to admission                                isosorbide dinitrate 10 MG tablet   Commonly known as:  ISORDIL   Take 1 tablet (10 mg) by mouth 3 times daily    Last time this was given:  10 mg on 6/1/2017  8:22 AM   Next Dose Due:  Take this afternoon and before bed today                                lisinopril 40 MG tablet   Commonly known as:  PRINIVIL/ZESTRIL   Take 1 tablet (40 mg) by mouth daily   Last time this was given:  40 mg on 6/1/2017  8:22 AM   Next Dose Due:  Start tomorrow 6/2                                metoprolol 100 MG 24 hr tablet   Commonly known as:  TOPROL-XL   Take 1 tablet (100 mg) by mouth daily   Last time this was given:  150 mg on 6/1/2017  8:21 AM   Next Dose Due:  Start tomorrow 6/2                                sertraline 50 MG tablet   Commonly known as:  ZOLOFT   Take 1 tablet (50 mg) by mouth daily   Notes to Patient:  Take as you were prior to admission                                vitamin D 1000 UNITS capsule   take 1 Cap by mouth daily.                                          More Information        Eating Heart-Healthy Foods  Eating has a big impact on your heart health. In fact, eating healthier can improve several of your heart risks at once. For instance, it helps you manage weight, cholesterol, and blood pressure. Here are ideas to help you make heart-healthy changes without giving up all the foods and flavors you love.  Getting started    Talk with your health care provider about eating plans, such as the DASH or Mediterranean diet. You may also be referred to a dietitian.    Change a few things at a time. Give yourself time to get used to a few eating changes before adding more.    Work to create a tasty, healthy eating plan that you can stick to for the rest of your life.    Goals for healthy eating  Below are some tips to improve your eating habits:    Limit saturated fats and trans fats. Saturated fats raise your levels of cholesterol, so keep these fats to a minimum. They are found in foods such as fatty meats, whole milk, cheese, and palm and coconut oils. Avoid trans fats because they lower good cholesterol as  well as raise bad cholesterol. Trans fats are most often found in processed foods.    Reduce sodium (salt) intake. Eating too much salt may increase your blood pressure. Limit your sodium intake to 2,300 milligrams (mg) per day, or less if your health care provider recommends it. Dining out less often and eating fewer processed foods are two great ways to decrease the amount of salt you consume.    Managing calories. A calorie is a unit of energy. Your body burns calories for fuel, but if you eat more calories than your body burns, the extras are stored as fat. Your health care provider can help you create a diet plan to manage your calories. This will likely include eating healthier foods as well as exercising regularly. To help you track your progress, keep a diary to record what you eat and how often you exercise.  Choose the right foods  Aim to make these foods staples of your diet. If you have diabetes, you may have different recommendations than what is listed here:    Fruits and vegetable provide plenty of nutrients without a lot of calories. At meals, fill half your plate with these foods. Split the other half of your plate between whole grains and lean protein.    Whole grains are high in fiber and rich in vitamins and nutrients. Good choices include whole-wheat bread, pasta, and brown rice.    Lean proteins give you nutrition with less fat. Good choices include fish, skinless chicken, and beans.    Low-fat or nonfat dairy provides nutrients without a lot of fat. Try low-fat or nonfat milk, cheese, or yogurt.    Healthy fats can be good for you in small amounts. These are unsaturated fats, such as olive oil, nuts, and fish. Try to have at least 2 servings per week of fatty fish such as salmon, sardines, mackerel, rainbow trout, and albacore tuna. These contain omega-3 fatty acids, which are good for your heart. Flaxseed is another source of a heart-healthy fat.  More on heart healthy eating    Read food  labels  Healthy eating starts at the grocery store. Be sure to pay attention to food labels on packaged foods. Look for products that are high in fiber and protein, and low in saturated fat, cholesterol, and sodium. Avoid products that contain trans fat. And pay close attention to serving size. For instance, if you plan to eat two servings, double all the numbers on the label.  Prepare food right  A key part of healthy cooking is cutting down on added fat and salt. Look on the internet for lower-fat, lower-sodium recipes. Also, try these tips:    Remove fat from meat and skin from poultry before cooking.    Skim fat from the surface of soups and sauces.    Broil, boil, bake, steam, grill, and microwave food without added fats.    Choose ingredients that spice up your food without adding calories, fat, or sodium. Try these items: horseradish, hot sauce, lemon, mustard, nonfat salad dressings, and vinegar. For salt-free herbs and spices, try basil, cilantro, cinnamon, pepper, and rosemary.    5317-4130 [employer]. [address] [place] [address]. All rights reserved. This information is not intended as a substitute for professional medical care. Always follow your healthcare professional's instructions.                Amlodipine Besylate Oral tablet  What is this medicine?  AMLODIPINE (am PAVAN di peen) is a calcium-channel blocker. It affects the amount of calcium found in your heart and muscle cells. This relaxes your blood vessels, which can reduce the amount of work the heart has to do. This medicine is used to lower high blood pressure. It is also used to prevent chest pain.  This medicine may be used for other purposes; ask your health care provider or pharmacist if you have questions.  What should I tell my health care provider before I take this medicine?  They need to know if you have any of these conditions:    heart problems like heart failure or aortic stenosis    liver disease    an  unusual or allergic reaction to amlodipine, other medicines, foods, dyes, or preservatives    pregnant or trying to get pregnant    breast-feeding  How should I use this medicine?  Take this medicine by mouth with a glass of water. Follow the directions on the prescription label. Take your medicine at regular intervals. Do not take more medicine than directed.  Talk to your pediatrician regarding the use of this medicine in children. Special care may be needed. This medicine has been used in children as young as 6.  Persons over 65 years old may have a stronger reaction to this medicine and need smaller doses.  Overdosage: If you think you have taken too much of this medicine contact a poison control center or emergency room at once.  NOTE: This medicine is only for you. Do not share this medicine with others.  What if I miss a dose?  If you miss a dose, take it as soon as you can. If it is almost time for your next dose, take only that dose. Do not take double or extra doses.  What may interact with this medicine?    herbal or dietary supplements    local or general anesthetics    medicines for high blood pressure    medicines for prostate problems    rifampin  This list may not describe all possible interactions. Give your health care provider a list of all the medicines, herbs, non-prescription drugs, or dietary supplements you use. Also tell them if you smoke, drink alcohol, or use illegal drugs. Some items may interact with your medicine.  What should I watch for while using this medicine?  Visit your doctor or health care professional for regular check ups. Check your blood pressure and pulse rate regularly. Ask your health care professional what your blood pressure and pulse rate should be, and when you should contact him or her.  This medicine may make you feel confused, dizzy or lightheaded. Do not drive, use machinery, or do anything that needs mental alertness until you know how this medicine affects you.  To reduce the risk of dizzy or fainting spells, do not sit or stand up quickly, especially if you are an older patient. Avoid alcoholic drinks; they can make you more dizzy.  Do not suddenly stop taking amlodipine. Ask your doctor or health care professional how you can gradually reduce the dose.  What side effects may I notice from receiving this medicine?  Side effects that you should report to your doctor or health care professional as soon as possible:    allergic reactions like skin rash, itching or hives, swelling of the face, lips, or tongue    breathing problems    changes in vision or hearing    chest pain    fast, irregular heartbeat    swelling of legs or ankles  Side effects that usually do not require medical attention (report to your doctor or health care professional if they continue or are bothersome):    dry mouth    facial flushing    nausea, vomiting    stomach gas, pain    tired, weak    trouble sleeping  This list may not describe all possible side effects. Call your doctor for medical advice about side effects. You may report side effects to FDA at 9-978-MZQ-6636.  Where should I keep my medicine?  Keep out of the reach of children.  Store at room temperature between 59 and 86 degrees F (15 and 30 degrees C). Protect from light. Keep container tightly closed. Throw away any unused medicine after the expiration date.  NOTE:This sheet is a summary. It may not cover all possible information. If you have questions about this medicine, talk to your doctor, pharmacist, or health care provider. Copyright  2016 [employer]                Patient Education    Isosorbide Dinitrate Oral capsule, extended-release    Isosorbide Dinitrate Oral tablet    Isosorbide Dinitrate Oral tablet, extended-release    Isosorbide Dinitrate Sublingual tablet  Isosorbide Dinitrate Oral tablet  What is this medicine?  ISOSORBIDE DINITRATE (eye ramana SOR bide dye ANY trate) is a type of vasodilator. It relaxes blood vessels,  increasing the blood and oxygen supply to your heart. This medicine is used to prevent chest pain caused by angina. It will not help to stop an episode of chest pain.  This medicine may be used for other purposes; ask your health care provider or pharmacist if you have questions.  What should I tell my health care provider before I take this medicine?  They need to know if you have any of these conditions:    previous heart attack or heart failure    an unusual or allergic reaction to isosorbide dinitrate, nitrates, other medicines, foods, dyes, or preservatives    pregnant or trying to get pregnant    breast-feeding  How should I use this medicine?  Take this medicine by mouth with a glass of water. Follow the directions on the prescription label. Take this medicine on an empty stomach, at least 30 minutes before or 2 hours after food. Do not take with food. Take your medicine at regular intervals. Do not take your medicine more often than directed. Do not stop taking this medicine suddenly or your symptoms may get worse. Ask your doctor or health care professional how to gradually reduce the dose.  Talk to your pediatrician regarding the use of this medicine in children. Special care may be needed.  Overdosage: If you think you have taken too much of this medicine contact a poison control center or emergency room at once.  NOTE: This medicine is only for you. Do not share this medicine with others.  What if I miss a dose?  If you miss a dose, take it as soon as you can. If it is almost time for your next dose, take only that dose. Do not take double or extra doses.  What may interact with this medicine?  Do not take this medicine with any of the following medications:    medicines used to treat erectile dysfunction (ED) like avanafil,sildenafil, tadalafil, and vardenafil    riociguat  This medicine may also interact with the following medications:    medicines for high blood pressure    other medicines for angina  or heart failure  This list may not describe all possible interactions. Give your health care provider a list of all the medicines, herbs, non-prescription drugs, or dietary supplements you use. Also tell them if you smoke, drink alcohol, or use illegal drugs. Some items may interact with your medicine.  What should I watch for while using this medicine?  Check your heart rate and blood pressure regularly while you are taking this medicine. Ask your doctor or health care professional what your heart rate and blood pressure should be and when you should contact him or her. Tell your doctor or health care professional if you feel your medicine is no longer working.  You may get dizzy. Do not drive, use machinery, or do anything that needs mental alertness until you know how this medicine affects you. To reduce the risk of dizzy or fainting spells, do not sit or stand up quickly, especially if you are an older patient. Alcohol can make you more dizzy, and increase flushing and rapid heartbeats. Avoid alcoholic drinks.  Do not treat yourself for coughs, colds, or pain while you are taking this medicine without asking your doctor or health care professional for advice. Some ingredients may increase your blood pressure.  What side effects may I notice from receiving this medicine?  Side effects that you should report to your doctor or health care professional as soon as possible:    bluish discoloration of lips, fingernails, or palms of hands    irregular heartbeat, palpitations    low blood pressure    nausea, vomiting    persistent headache    unusually weak or tired  Side effects that usually do not require medical attention (report to your doctor or health care professional if they continue or are bothersome):    flushing of the face or neck    rash  This list may not describe all possible side effects. Call your doctor for medical advice about side effects. You may report side effects to FDA at 3-787-BMC-7380.  Where  should I keep my medicine?  Keep out of the reach of children.  Store at room temperature, approximately 25 degrees C (77 degrees F). Protect from light. Keep container tightly closed. Throw away any unused medicine after the expiration date.  NOTE:This sheet is a summary. It may not cover all possible information. If you have questions about this medicine, talk to your doctor, pharmacist, or health care provider. Copyright  2016 [employer]                Patient Education    Cefadroxil Oral capsule    Cefadroxil Oral suspension    Cefadroxil Oral tablet  Cefadroxil Oral capsule  What is this medicine?  CEFADROXIL (sef a DROX il) is a cephalosporin antibiotic. It is used to treat certain kinds of bacterial infections. It will not work for colds, flu, or other viral infections.  This medicine may be used for other purposes; ask your health care provider or pharmacist if you have questions.  What should I tell my health care provider before I take this medicine?  They need to know if you have any of these conditions:    bleeding problems    kidney disease    stomach or intestine problems (especially colitis)    an unusual or allergic reaction to cefadroxil, other cephalosporin antibiotics, penicillin, penicillamine, other foods, dyes or preservatives    pregnant or trying to get pregnant    breast-feeding  How should I use this medicine?  Take this medicine by mouth. Follow the directions on the prescription label. Swallow it with a drink of water. You can take it with or without food. If it upsets your stomach it may help to take it with food. Take your doses at regular intervals. Do not take it more often than directed. Finish all the medicine you are prescribed even if you think your infection is better.  Talk to your pediatrician regarding the use of this medicine in children. Special care may be needed. This medicine has been used in children as young as 6 years old.  Overdosage: If you think you have taken  too much of this medicine contact a poison control center or emergency room at once.  NOTE: This medicine is only for you. Do not share this medicine with others.  What if I miss a dose?  If you miss a dose, take it as soon as you can. If it is almost time for your next dose, take only that dose. Do not take double or extra doses. There should be an interval of at least 6 hours between doses.  What may interact with this medicine?    other antibiotics    probenecid  This list may not describe all possible interactions. Give your health care provider a list of all the medicines, herbs, non-prescription drugs, or dietary supplements you use. Also tell them if you smoke, drink alcohol, or use illegal drugs. Some items may interact with your medicine.  What should I watch for while using this medicine?  Tell your doctor or health care professional if your symptoms do not get better in a few days.  If you are diabetic you may get a false-positive result for sugar in your urine. Check with your doctor or health care professional before you change your diet or the dose of your diabetes medicine.  What side effects may I notice from receiving this medicine?  Side effects that you should report to your doctor or health care professional as soon as possible:    allergic reactions like skin rash, itching or hives, swelling of the face, lips, or tongue    breathing problems    dizziness    fever or chills    redness, blistering, peeling or loosening of the skin, including inside the mouth    seizures    severe or watery diarrhea    sore throat    swollen joints    trouble passing urine or change in the amount of urine    unusually weak or tired  Side effects that usually do not require medical attention (report to your doctor or health care professional if they continue or are bothersome):    diarrhea    gas or heartburn    nausea, vomiting  This list may not describe all possible side effects. Call your doctor for medical  advice about side effects. You may report side effects to FDA at 5-714-FDA-3528.  Where should I keep my medicine?  Keep out of the reach of children.  Store at room temperature between 15 and 30 degrees C (59 and 86 degrees F). Throw the medicine away after the expiration date.  NOTE:This sheet is a summary. It may not cover all possible information. If you have questions about this medicine, talk to your doctor, pharmacist, or health care provider. Copyright  2016 Gold Standard                Atorvastatin Calcium Oral tablet  What is this medicine?  ATORVASTATIN (a TORE va sta tin) is known as a HMG-CoA reductase inhibitor or 'statin'. It lowers the level of cholesterol and triglycerides in the blood. This drug may also reduce the risk of heart attack, stroke, or other health problems in patients with risk factors for heart disease. Diet and lifestyle changes are often used with this drug.  This medicine may be used for other purposes; ask your health care provider or pharmacist if you have questions.  What should I tell my health care provider before I take this medicine?  They need to know if you have any of these conditions:    frequently drink alcoholic beverages    history of stroke, TIA    kidney disease    liver disease    muscle aches or weakness    other medical condition    an unusual or allergic reaction to atorvastatin, other medicines, foods, dyes, or preservatives    pregnant or trying to get pregnant    breast-feeding  How should I use this medicine?  Take this medicine by mouth with a glass of water. Follow the directions on the prescription label. You can take this medicine with or without food. Take your doses at regular intervals. Do not take your medicine more often than directed.  Talk to your pediatrician regarding the use of this medicine in children. While this drug may be prescribed for children as young as 10 years old for selected conditions, precautions do apply.  Overdosage: If you  think you have taken too much of this medicine contact a poison control center or emergency room at once.  NOTE: This medicine is only for you. Do not share this medicine with others.  What if I miss a dose?  If you miss a dose, take it as soon as you can. If it is almost time for your next dose, take only that dose. Do not take double or extra doses.  What may interact with this medicine?  Do not take this medicine with any of the following medications:    red yeast rice    telaprevir    telithromycin    voriconazole  This medicine may also interact with the following medications:    alcohol    antiviral medicines for HIV or AIDS    boceprevir    certain antibiotics like clarithromycin, erythromycin, troleandomycin    certain medicines for cholesterol like fenofibrate or gemfibrozil    cimetidine    clarithromycin    colchicine    cyclosporine    digoxin    female hormones, like estrogens or progestins and birth control pills    grapefruit juice    medicines for fungal infections like fluconazole, itraconazole, ketoconazole    niacin    rifampin    spironolactone  This list may not describe all possible interactions. Give your health care provider a list of all the medicines, herbs, non-prescription drugs, or dietary supplements you use. Also tell them if you smoke, drink alcohol, or use illegal drugs. Some items may interact with your medicine.  What should I watch for while using this medicine?  Visit your doctor or health care professional for regular check-ups. You may need regular tests to make sure your liver is working properly.  Tell your doctor or health care professional right away if you get any unexplained muscle pain, tenderness, or weakness, especially if you also have a fever and tiredness. Your doctor or health care professional may tell you to stop taking this medicine if you develop muscle problems. If your muscle problems do not go away after stopping this medicine, contact your health care  professional.  This drug is only part of a total heart-health program. Your doctor or a dietician can suggest a low-cholesterol and low-fat diet to help. Avoid alcohol and smoking, and keep a proper exercise schedule.  Do not use this drug if you are pregnant or breast-feeding. Serious side effects to an unborn child or to an infant are possible. Talk to your doctor or pharmacist for more information.  This medicine may affect blood sugar levels. If you have diabetes, check with your doctor or health care professional before you change your diet or the dose of your diabetic medicine.  If you are going to have surgery tell your health care professional that you are taking this drug.  What side effects may I notice from receiving this medicine?  Side effects that you should report to your doctor or health care professional as soon as possible:    allergic reactions like skin rash, itching or hives, swelling of the face, lips, or tongue    dark urine    fever    joint pain    muscle cramps, pain    redness, blistering, peeling or loosening of the skin, including inside the mouth    trouble passing urine or change in the amount of urine    unusually weak or tired    yellowing of eyes or skin  Side effects that usually do not require medical attention (report to your doctor or health care professional if they continue or are bothersome):    constipation    heartburn    stomach gas, pain, upset  This list may not describe all possible side effects. Call your doctor for medical advice about side effects. You may report side effects to FDA at 8-540-FDA-4084.  Where should I keep my medicine?  Keep out of the reach of children.  Store at room temperature between 20 to 25 degrees C (68 to 77 degrees F). Throw away any unused medicine after the expiration date.  NOTE:This sheet is a summary. It may not cover all possible information. If you have questions about this medicine, talk to your doctor, pharmacist, or health care  provider. Copyright  2016 Gold Standard

## 2017-05-28 NOTE — ED NOTES
[facility]  ED Nurse Handoff Report    Daya Ignacio is a 86 year old female   ED Chief complaint: Fall  . ED Diagnosis:   Final diagnoses:   Non-STEMI (non-ST elevated myocardial infarction) (H)   Fall, initial encounter   Urinary tract infection, site unspecified   Hypokalemia     Allergies:   Allergies   Allergen Reactions     Macrobid [Nitrofurantoin Anhydrous] Other (See Comments)     Body aches     Penicillins      Sulfa Drugs        Code Status: Full Code  Activity level - Baseline/Home:  Stand with Assist. Activity Level - Current:   Stand with Assist. Lift room needed: No. Bariatric: No   Needed: No   Isolation: No. Infection: Not Applicable.     Vital Signs:   Vitals:    05/28/17 1500 05/28/17 1511 05/28/17 1515 05/28/17 1530   BP: 162/74 164/78 159/77 164/74   Pulse:       Resp:  18     Temp:       TempSrc:       SpO2:  95% 98% 97%   Weight:         Cardiac Rhythm:  ,   Cardiac  Ectopy: None  Pain level: 0-10 Pain Scale: 4  Patient confused: No. Patient Falls Risk: Yes.   Elimination Status: Has voided   Patient Report - Initial Complaint:  Fall Focused Assessment:    Tests Performed: see labs results Abnormal Results: see lab results  Treatments provided:OBS brochure/video discussed/provided to patient:  N/A  ED Medications:   Medications   sodium chloride (PF) 0.9% PF flush 3 mL (not administered)   sodium chloride (PF) 0.9% PF flush 3 mL (not administered)   0.9% sodium chloride BOLUS (1,000 mLs Intravenous New Bag 5/28/17 1223)     Followed by   0.9% sodium chloride infusion (not administered)   nitroglycerin (NITROSTAT) sublingual tablet 0.4 mg (0.4 mg Sublingual Given 5/28/17 1348)   heparin infusion 25,000 units in 0.45% NaCl 250 mL (12 Units/kg/hr × 55.9 kg (Adjusted) Intravenous New Bag 5/28/17 1539)   metoprolol (LOPRESSOR) injection 5 mg (not administered)   ondansetron (ZOFRAN) injection 4 mg (4 mg Intravenous Given 5/28/17 1221)   acetaminophen (TYLENOL) tablet 650  mg (650 mg Oral Given 5/28/17 1300)   aspirin chewable tablet 324 mg (324 mg Oral Given 5/28/17 1438)   potassium chloride (KLOR-CON) Packet 40 mEq (40 mEq Oral Given 5/28/17 1345)   heparin Loading Dose bolus dose from infusion pump 3,350 Units (3,350 Units Intravenous Given 5/28/17 1539)     Drips infusing:  Yes     ED Nurse Name/Phone Number: Karen M. Lesch,   4:05 PM  \    RECEIVING UNIT ED HANDOFF REVIEW    Above ED Nurse Handoff Report was reviewed: Yes, Thank you!  Reviewed by: Sabina Reynaga on May 28, 2017 at 4:19 PM

## 2017-05-28 NOTE — IP AVS SNAPSHOT
[place] [facility] [address] [facility]    [address]    [facility] [address]    Phone:  [phone]    Fax:  [phone]                                       After Visit Summary   5/28/2017    Daya Ignacio    MRN: [id number]           After Visit Summary Signature Page     I have received my discharge instructions, and my questions have been answered. I have discussed any challenges I see with this plan with the nurse or doctor.    ..........................................................................................................................................  Patient/Patient Representative Signature      ..........................................................................................................................................  Patient Representative Print Name and Relationship to Patient    ..................................................               ................................................  Date                                            Time    ..........................................................................................................................................  Reviewed by Signature/Title    ...................................................              ..............................................  Date                                                            Time

## 2017-05-28 NOTE — ED PROVIDER NOTES
"  History     Chief Complaint:  Fall    HPI   Daya Ignacio is a non anticoagulated 86 year old female with a history of hypertension and hyperlipidemia who presents to the emergency department via EMS from her assisted living facility following a fall. The patient states that yesterday she had been feeling somewhat generally ill and had an episode of nonbloody emesis. She additionally notes that she was able to tolerate PO well yesterday and this morning secondary to nausea. This morning while getting up to go to the bathroom at approximately 0900, the patient states that her legs \"gave out,\" causing her to fall to the ground. She denies striking her head or losing consciousness as a result of this incident. EMS was then called and brought her to the ED.Of note, her blood sugar was 100 in route, with a systolic blood pressure in the 180s, though the patient did not take her morning dose of antihypertensives.    Here in the ED, the patient complains of slight neck pain, but no other present pain or injury to her abdomen, chest, or back. She denies any recent fever, chills, cough,or urinary symptoms.     Allergies:  Macrobid  Penicillins  Sulfa drugs     Medications:    Neurontin  Desyrel  Lisinopril  Clonidine  Metoprolol  Zoloft  Premarin cream     Past Medical History:    Hypertension  hyperlipidemia  Macular degeneration   Closed fracture of neck  Closed fracture of vertebral column  Artifical hips  LISSA  Osteoporosis  Sciatica  Hip fracture    Past Surgical History:    total hip arthroplasty, bilateral    Family History:    Alzheimer Disease  MI  Parkinson's disease    Social History:  Lives in an assisted living facility.  Negative for alcohol use.  Negative for tobacco use.    Review of Systems   Constitutional: Positive for appetite change. Negative for chills and fever.   Respiratory: Negative for cough.    Gastrointestinal: Positive for vomiting. Negative for abdominal pain.   Musculoskeletal: Positive for " neck pain.   Neurological: Negative for dizziness.     Physical Exam   Patient Vitals for the past 24 hrs:   BP Temp Temp src Pulse Heart Rate Resp SpO2   05/28/17 1138 182/85 99.5  F (37.5  C) Oral 91 91 20 95 %          Physical Exam  Gen: Pleasant, appears stated age.    Eye:   Pupils are equal, round, and reactive.     Sclera non-injected.    HENT:  Dry mucous membranes and vomit around the mouth.   Atraumatic head   Normal tongue.    Oropharynx without lesions.    Cardiac:     Normal rate and regular rhythm.    No murmurs, gallops, or rubs.    Pulmonary:     Clear to auscultation bilaterally.    No wheezes, rales, or rhonchi.    Abdomen:     Normal active bowel sounds.     Abdomen is soft and non-distended, without focal tenderness.    Musculoskeletal:     Normal movement of all extremities without evidence for deficit.    No cervical, thoracic, or lumbar midline spine tenderness.    Extremities:    No edema.    Skin:   Warm and dry.  No bruise or abrasion.    Neurologic:    GCS 15.     Speech is fluent, cognition is normal.     EOMI, symmetric grimace.     Str 5/5 in RUE, LUE, RLE, LLE.     Fine touch sensation intact in BUE/BLE.    Psychiatric:     Normal affect with appropriate interaction with examiner.    Emergency Department Course   ECG:  Indication: Fall  Time: 1219  Vent. Rate 93 bpm. HI interval 224. QRS duration 88. QT/QTc 392/487. P-R-T axis 70 -41 25. Sinus rhythm with 1st degree AV block. Left axis deviation. Nonspecific ST abnormality. Abnormal ECG. No significant change compared to EKG dated 6/23/11. Read time: 1237    Imaging:  Radiographic findings were communicated with the patient who voiced understanding of the findings.    CT Head without contrast:   Age related changes and white matter changes consistent  with small vessel ischemic disease. No acute abnormality is seen. As per radiology.    CT Cervical Spine without contrast:   Degenerative changes with no fracture or other evidence  of  acute injury.  As per radiology.    XR Chest 2 views:   No active disease.  As per radiology.     Laboratory:  CBC: WBC: 11.0, HGB: 13.8, PLT: 114(L)  CMP: Glucose 114(H), Potassium: 3.1(L), Bilirubin: 1.6(H) o/w WNL (Creatinine: 0.60)  Lactic acid whole blood:1.3  1115 Troponin: 3.791 (HH)  1425 Troponin: 3.870(HH)  Magnesium: 1.9  Procalctonin: pending    UA with micro: Urine ketone: 5, Protein albumin: 30, Positive Nitrite, many bacteria, mucous present, RBC: 17(H), WBC: >182(H) moderate leukocyte esterase, large blood o/w negative  Urine culture: In process    Interventions:  1221 Zofran, 4 mg, IV  1223 NS 1L IV  1300 Tylenol, 650 mg, PO  1345 Klor-Con, 40 mEq, PO  1348 Nitrostat, 0.4 mg, Sublingual  1438 Aspirin, 324 mg, PO  1539 Heparin 3350 units IV   Heparin 25,000 units at 12 units/kg/hr IV  Omnicef 300 mg PO- ordered   Metoprolol 100 mg PO and 5 mg IV -ordered    Emergency Department Course:  Nursing notes and vitals reviewed. I performed an exam of the patient as documented above.    EKG obtained in the ED, see results above.     IV inserted and blood drawn. The patient was placed on continuous blood pressure monitoring and pulse oximetry.     The patient was sent for a chest XR, CT Head, and Cervical Spine CT while in the emergency department, findings above.     The patient provided a urine sample here in the emergency department. This was sent for laboratory testing, findings above.     1338 I rechecked the patient and discussed the results of her workup thus far.     1551  I consulted with Dr. Harden of the hospitalist services. They are in agreement to accept the patient for admission.    1554 I reevaluated the patient and provided an update in regards to her ED course.      Findings and plan explained to the Patient who consents to admission. Discussed the patient with Dr. Harden, who will admit the patient to a cardiac telemetry bed for further monitoring, evaluation, and  treatment.    Impression & Plan      Medical Decision Making:  Daya Ignacio is a 86 year old female with a history of hypertension, hyperlipidemia, who presents today with generalized malaise, one episode of vomiting, and unsteadiness with fall. On physical exam, the patient is noted to be hypertensive with low grade temperature. She did complain of some neck pain, although had poorly localized pain on exam. The EKG demonstrates lateral ST depressions. She did not have any ST elevations. Her troponin returned elevated at 3.791. It is minimally changed in 3 hours. CT of the head was obtained given fall and plans for anticoagulation. Fortunately, this is negative for any intercranial injury.  We discussed that her urinalysis shows bacteria and inflammation.  She has been told by her urologist that she should not start antibiotics until culture returns, as she has been inappropriately treated for UTI multiple times in the past. At this point, she does not have fever or other signs to suggest sepsis. She will be treated with antibiotics and admitted to the hospital service for additional evaluation.     Diagnosis:    ICD-10-CM    1. Non-STEMI (non-ST elevated myocardial infarction) (H) I21.4 Urine Culture Aerobic Bacterial     Troponin I     Blood culture     Procalcitonin     Procalcitonin     CANCELED: Procalcitonin   2. Fall, initial encounter W19.XXXA    3. Hypokalemia E87.6    4. Acute cystitis with hematuria N30.01        Disposition:  Admitted to Dr. Harden of the hospitalist service     IJanice, am serving as a scribe on 5/28/2017 at 11:59 AM to personally document services performed by Evelyne Mehta MD based on my observations and the provider's statements to me.     Janice Marks  5/28/2017   [facility] EMERGENCY DEPARTMENT       Evelyne Mehta MD  05/28/17 4937

## 2017-05-28 NOTE — ED NOTES
Bed: ED36  Expected date: 5/28/17  Expected time: 11:24 AM  Means of arrival: Ambulance  Comments:  Anju Tong

## 2017-05-28 NOTE — PROGRESS NOTES
ROOM # [id number]    Patient  given Patient Bill of Rights; given the opportunity to ask questions about status and their plan of care.  Patient has been oriented to the room, bathroom and call light is in place.  Discussed discharge goals and expectations with patient/family. Fall risk - heparin gtt started in ER. BP systolic 177 upon arrival. Denies pain. Second dose of K+ given w/re-check ordered for 2130. Up to BR 1 assist x2, frequency. Rocephin for UTI. Side effects of new medications reviewed w/pt. Echo & cards cx tomorrow.

## 2017-05-29 ENCOUNTER — APPOINTMENT (OUTPATIENT)
Dept: CARDIOLOGY | Facility: CLINIC | Age: 82
DRG: 281 | End: 2017-05-29
Attending: INTERNAL MEDICINE
Payer: MEDICARE

## 2017-05-29 LAB
ANION GAP SERPL CALCULATED.3IONS-SCNC: 6 MMOL/L (ref 3–14)
BACTERIA SPEC CULT: ABNORMAL
BASOPHILS # BLD AUTO: 0 10E9/L (ref 0–0.2)
BASOPHILS NFR BLD AUTO: 0.3 %
BUN SERPL-MCNC: 11 MG/DL (ref 7–30)
CALCIUM SERPL-MCNC: 8.9 MG/DL (ref 8.5–10.1)
CHLORIDE SERPL-SCNC: 100 MMOL/L (ref 94–109)
CO2 SERPL-SCNC: 28 MMOL/L (ref 20–32)
CREAT SERPL-MCNC: 0.5 MG/DL (ref 0.52–1.04)
DIFFERENTIAL METHOD BLD: ABNORMAL
EOSINOPHIL # BLD AUTO: 0 10E9/L (ref 0–0.7)
EOSINOPHIL NFR BLD AUTO: 0.3 %
ERYTHROCYTE [DISTWIDTH] IN BLOOD BY AUTOMATED COUNT: 13.9 % (ref 10–15)
GFR SERPL CREATININE-BSD FRML MDRD: ABNORMAL ML/MIN/1.7M2
GLUCOSE SERPL-MCNC: 113 MG/DL (ref 70–99)
HCT VFR BLD AUTO: 39.3 % (ref 35–47)
HGB BLD-MCNC: 12.9 G/DL (ref 11.7–15.7)
IMM GRANULOCYTES # BLD: 0.1 10E9/L (ref 0–0.4)
IMM GRANULOCYTES NFR BLD: 0.6 %
INTERPRETATION ECG - MUSE: NORMAL
LMWH PPP CHRO-ACNC: 0.15 IU/ML
LMWH PPP CHRO-ACNC: NORMAL IU/ML
LMWH PPP CHRO-ACNC: NORMAL IU/ML
LYMPHOCYTES # BLD AUTO: 0.7 10E9/L (ref 0.8–5.3)
LYMPHOCYTES NFR BLD AUTO: 7.7 %
Lab: ABNORMAL
MAGNESIUM SERPL-MCNC: 1.9 MG/DL (ref 1.6–2.3)
MCH RBC QN AUTO: 30 PG (ref 26.5–33)
MCHC RBC AUTO-ENTMCNC: 32.8 G/DL (ref 31.5–36.5)
MCV RBC AUTO: 91 FL (ref 78–100)
MICRO REPORT STATUS: ABNORMAL
MICROORGANISM SPEC CULT: ABNORMAL
MONOCYTES # BLD AUTO: 0.7 10E9/L (ref 0–1.3)
MONOCYTES NFR BLD AUTO: 7.2 %
NEUTROPHILS # BLD AUTO: 7.9 10E9/L (ref 1.6–8.3)
NEUTROPHILS NFR BLD AUTO: 83.9 %
NRBC # BLD AUTO: 0 10*3/UL
NRBC BLD AUTO-RTO: 0 /100
PLATELET # BLD AUTO: 105 10E9/L (ref 150–450)
POTASSIUM SERPL-SCNC: 3.7 MMOL/L (ref 3.4–5.3)
RBC # BLD AUTO: 4.3 10E12/L (ref 3.8–5.2)
RETICS # AUTO: 38.7 10E9/L (ref 25–95)
RETICS/RBC NFR AUTO: 0.9 % (ref 0.5–2)
SODIUM SERPL-SCNC: 134 MMOL/L (ref 133–144)
SPECIMEN SOURCE: ABNORMAL
TROPONIN I SERPL-MCNC: 2.1 UG/L (ref 0–0.04)
WBC # BLD AUTO: 9.4 10E9/L (ref 4–11)

## 2017-05-29 PROCEDURE — 25000132 ZZH RX MED GY IP 250 OP 250 PS 637: Mod: GY | Performed by: INTERNAL MEDICINE

## 2017-05-29 PROCEDURE — 12000007 ZZH R&B INTERMEDIATE

## 2017-05-29 PROCEDURE — 93306 TTE W/DOPPLER COMPLETE: CPT

## 2017-05-29 PROCEDURE — 99233 SBSQ HOSP IP/OBS HIGH 50: CPT | Performed by: INTERNAL MEDICINE

## 2017-05-29 PROCEDURE — 93306 TTE W/DOPPLER COMPLETE: CPT | Mod: 26 | Performed by: INTERNAL MEDICINE

## 2017-05-29 PROCEDURE — 84484 ASSAY OF TROPONIN QUANT: CPT | Performed by: INTERNAL MEDICINE

## 2017-05-29 PROCEDURE — A9270 NON-COVERED ITEM OR SERVICE: HCPCS | Mod: GY | Performed by: INTERNAL MEDICINE

## 2017-05-29 PROCEDURE — 36415 COLL VENOUS BLD VENIPUNCTURE: CPT | Performed by: INTERNAL MEDICINE

## 2017-05-29 PROCEDURE — 85520 HEPARIN ASSAY: CPT | Performed by: INTERNAL MEDICINE

## 2017-05-29 PROCEDURE — 85060 BLOOD SMEAR INTERPRETATION: CPT | Performed by: INTERNAL MEDICINE

## 2017-05-29 PROCEDURE — 40000847 ZZHCL STATISTIC MORPHOLOGY W/INTERP HISTOLOGY TC 85060: Performed by: INTERNAL MEDICINE

## 2017-05-29 PROCEDURE — 80048 BASIC METABOLIC PNL TOTAL CA: CPT | Performed by: INTERNAL MEDICINE

## 2017-05-29 PROCEDURE — 25000128 H RX IP 250 OP 636: Performed by: INTERNAL MEDICINE

## 2017-05-29 PROCEDURE — 83735 ASSAY OF MAGNESIUM: CPT | Performed by: INTERNAL MEDICINE

## 2017-05-29 PROCEDURE — 99222 1ST HOSP IP/OBS MODERATE 55: CPT | Performed by: INTERNAL MEDICINE

## 2017-05-29 PROCEDURE — 85025 COMPLETE CBC W/AUTO DIFF WBC: CPT | Performed by: INTERNAL MEDICINE

## 2017-05-29 PROCEDURE — 85045 AUTOMATED RETICULOCYTE COUNT: CPT | Performed by: INTERNAL MEDICINE

## 2017-05-29 RX ORDER — ASPIRIN 81 MG/1
81 TABLET, CHEWABLE ORAL DAILY
Status: DISCONTINUED | OUTPATIENT
Start: 2017-05-29 | End: 2017-05-31

## 2017-05-29 RX ORDER — CLONIDINE HYDROCHLORIDE 0.1 MG/1
0.1 TABLET ORAL 2 TIMES DAILY
Status: DISCONTINUED | OUTPATIENT
Start: 2017-05-29 | End: 2017-05-30

## 2017-05-29 RX ORDER — ATORVASTATIN CALCIUM 10 MG/1
10 TABLET, FILM COATED ORAL DAILY
Status: DISCONTINUED | OUTPATIENT
Start: 2017-05-29 | End: 2017-06-01 | Stop reason: HOSPADM

## 2017-05-29 RX ORDER — HYDRALAZINE HYDROCHLORIDE 20 MG/ML
10 INJECTION INTRAMUSCULAR; INTRAVENOUS EVERY 4 HOURS PRN
Status: DISCONTINUED | OUTPATIENT
Start: 2017-05-29 | End: 2017-06-01 | Stop reason: HOSPADM

## 2017-05-29 RX ORDER — LISINOPRIL 40 MG/1
40 TABLET ORAL DAILY
Status: DISCONTINUED | OUTPATIENT
Start: 2017-05-29 | End: 2017-06-01 | Stop reason: HOSPADM

## 2017-05-29 RX ADMIN — Medication 2800 UNITS: at 19:57

## 2017-05-29 RX ADMIN — METOPROLOL SUCCINATE 100 MG: 100 TABLET, EXTENDED RELEASE ORAL at 07:26

## 2017-05-29 RX ADMIN — ACETAMINOPHEN 650 MG: 325 TABLET, FILM COATED ORAL at 22:40

## 2017-05-29 RX ADMIN — ACETAMINOPHEN 650 MG: 325 TABLET, FILM COATED ORAL at 17:09

## 2017-05-29 RX ADMIN — HEPARIN SODIUM 650 UNITS/HR: 10000 INJECTION, SOLUTION INTRAVENOUS at 18:20

## 2017-05-29 RX ADMIN — ISOSORBIDE DINITRATE 10 MG: 10 TABLET ORAL at 16:27

## 2017-05-29 RX ADMIN — ACETAMINOPHEN 650 MG: 325 TABLET, FILM COATED ORAL at 05:24

## 2017-05-29 RX ADMIN — CLONIDINE HYDROCHLORIDE 0.1 MG: 0.1 TABLET ORAL at 07:26

## 2017-05-29 RX ADMIN — LISINOPRIL 40 MG: 40 TABLET ORAL at 07:26

## 2017-05-29 RX ADMIN — ISOSORBIDE DINITRATE 10 MG: 10 TABLET ORAL at 07:27

## 2017-05-29 RX ADMIN — CLONIDINE HYDROCHLORIDE 0.1 MG: 0.1 TABLET ORAL at 21:14

## 2017-05-29 RX ADMIN — ATORVASTATIN CALCIUM 10 MG: 10 TABLET, FILM COATED ORAL at 13:54

## 2017-05-29 RX ADMIN — CEFTRIAXONE SODIUM 1 G: 1 INJECTION, POWDER, FOR SOLUTION INTRAMUSCULAR; INTRAVENOUS at 17:12

## 2017-05-29 RX ADMIN — ISOSORBIDE DINITRATE 10 MG: 10 TABLET ORAL at 21:14

## 2017-05-29 RX ADMIN — ASPIRIN 81 MG 81 MG: 81 TABLET ORAL at 13:54

## 2017-05-29 ASSESSMENT — VISUAL ACUITY: OU: NORMAL ACUITY

## 2017-05-29 NOTE — H&P
CHIEF COMPLAINT:  Fall.      HISTORY OF PRESENT ILLNESS:  Daya Ignacio is an 86-year-old white female with a history of hypertension, macular degeneration who lives at an independent living facility.  The patient was in her usual state of health until yesterday when she had some chills.  She denies any documented fever.  She also had some nausea and had emesis, which was clear x4 yesterday.  This morning when she was getting out of bed, she felt like her legs could not move; she felt weak.  Nonetheless, she managed to get to the bathroom.  Subsequently her legs gave out and she fell on the floor.  She denies any head trauma.  She denies any loss of consciousness.  She denies any abdominal pain or diarrhea.  She denies any headache or muscle aches.  She denies any urinary symptoms.  She does endorse some dull pain in her shoulder blades, which has been going on since yesterday and which is better with heat pad.  She denies any chest pain, tightness, heaviness or pressure.  She managed to call EMS and subsequently was brought into the ER over here.  I discussed her care with Dr. Pepper over here, she is noted to have an EKG which is normal; however, troponin is markedly abnormal.  She has a possible UTI and I am asked to admit her for further evaluation.      PAST MEDICAL HISTORY:  Significant for macular degeneration, hypertension.      PAST SURGICAL HISTORY:  Significant for bilateral hip surgeries as well as cataract surgery.     Prior to Admission medications    Medication Sig Last Dose Taking? Auth Provider   gabapentin (NEURONTIN) 300 MG capsule TAKE ONE CAPSULE BY MOUTH THREE TIMES A DAY 5/27/2017 at Unknown time Yes Marcy Soares PA-C   acetaminophen-codeine (TYLENOL/CODEINE #3) 300-30 MG per tablet Take 1 tablet by mouth every 8 hours as needed for mild pain 5/27/2017 at Unknown time Yes Marcy Soares PA-C   sertraline (ZOLOFT) 50 MG tablet Take 1 tablet (50 mg) by mouth daily  5/27/2017 at Unknown time Yes Marcy Soares PA-C   lisinopril (PRINIVIL/ZESTRIL) 40 MG tablet Take 1 tablet (40 mg) by mouth daily 5/27/2017 at Unknown time Yes Marcy Soares PA-C   cloNIDine (CATAPRES) 0.1 MG tablet Take 1 tablet (0.1 mg) by mouth 2 times daily 5/27/2017 at Unknown time Yes Marcy Soares PA-C   metoprolol (TOPROL-XL) 100 MG 24 hr tablet Take 1 tablet (100 mg) by mouth daily 5/27/2017 at Unknown time Yes Marcy Soares PA-C   conjugated estrogens (PREMARIN) vaginal cream Place 0.5 g vaginally three times a week Past Week at fri Yes Juan Manuel Caballero MD   ascorbic acid (VITAMIN C) 500 MG tablet take 1 Tab by mouth daily. 5/27/2017 at Unknown time Yes Christie Mancilla MD   Cholecalciferol (VITAMIN D) 1000 UNIT capsule take 1 Cap by mouth daily. 5/27/2017 at Unknown time Yes Christie Mancilla MD   fish oil-omega-3 fatty acids 1000 MG capsule take 2 Caps by mouth daily. 5/27/2017 at Unknown time Yes Christie Mancilla MD           FAMILY HISTORY:  Significant for heart disease in her father who smoked who had a heart attack.      SOCIAL HISTORY:  She does not smoke or drink alcohol.  She lives independently.      ALLERGIES:  Macrobid.      REVIEW OF SYSTEMS:  As mentioned in the HPI.  She denies any chest pain, tightness, heaviness or pressure.  She denies any abdominal pain or diarrhea.  All other systems are extensively reviewed and deemed unremarkable and negative.      PHYSICAL EXAMINATION:   VITAL SIGNS:  Her temperature is 99.5, pulse is 78, blood pressure is 164/74, respiratory rate 18, O2 sat is 97% on 2 liters.   GENERALLY:  She is alert, awake, oriented, coherent, nontoxic, in no acute distress.  She is accompanied by her son and daughter-in-law.   HEENT:  She has got missing teeth on her lower jaw.  Pharynx, there is no exudate.  There is no tender anterior cervical lymphadenopathy.   NECK:  Supple.  There is no palpable  lymphadenopathy.   LUNGS:  Clear to auscultation bilaterally.   HEART:  Regular rate, S1, S2 normal.  No murmurs or gallops.   ABDOMEN:  Soft, nontender with good bowel sounds.   EXTREMITIES:  There is no edema.   SKIN:  There is no rash.   NEUROLOGIC:  Cranial nerves II-XII are grossly within normal limits, though she has decreased visual acuity.  Motor strength is 5/5 in all extremities.  There is no pain with range of motion of the neck.      LABORATORY:  Labwork obtained here included a CMP which is grossly unremarkable, other than a potassium of 3.1, total bilirubin of 1.6.  Lactic acid of 1.3.  LFTs were within normal limits.  Her troponin was 3.791.  A CBC with diff is grossly unremarkable, other than an absolute neutrophil count of 9.7.  A urinalysis shows greater than 182 WBCs with 17 RBCs, moderate leukocyte esterase and positive nitrites.  Urine cultures are pending.      IMAGING:  She had the following imaging studies here in the ER:  CT of the head without contrast, which showed no acute abnormality.  CT of the C-spine, which showed degenerative changes with no fracture or acute injury.  Chest x-ray, 2 views, which is pending.  An EKG, which I reviewed, which shows normal sinus rhythm at 93 beats per minute and first-degree AV block, nonspecific ST abnormalities.      ASSESSMENT AND PLAN:   1.  Non-ST elevation myocardial infarction.  Troponin is markedly elevated.  She has been given aspirin and initiated on intravenous heparin, which I will continue.  In addition, I will put her on a nitropatch.  We will trend her troponins.  We will get an echocardiogram as well as Cardiology consultation.  It is possible that her troponin could be related to demand ischemia/myocarditis.  However, for now given the date, I will treat her as acute coronary syndrome.   2.  Possible urinary tract infection.  This could have resulted in acute demand ischemia.  We will treat her with intravenous Rocephin.  Urine cultures  are pending.      CODE STATUS:  Full code.      She will be admitted as an inpatient to the telemetry unit.         GET ROJAS MD             D: 2017 16:33   T: 2017 21:02   MT: MARIO#[id number]      Name:     MAGALY MCDANIELS   MRN:      [id number]        Account:      [id number]   :      10/11/1930           Admitted:     [id number]      Document: [id number]

## 2017-05-29 NOTE — CONSULTS
CARDIOLOGY CONSULTATION      REQUESTING PHYSICIAN:  Dr. Shaan Harden.       INDICATION:  Non-ST segment elevation myocardial infarction.      HISTORY OF PRESENT ILLNESS:  Ms. Daya Ignacio is an 86-year-old female with a history of essential hypertension, frequent urinary tract infections, undiagnosed thrombocytopenia who presents with symptoms related to urinary tract infection.  She had been having fevers and chills and generalized weakness.  As a part of her evaluation, she underwent blood work showing an elevated troponin of 3.87.  This has subsequently dropped to 2.989 and then down to 2.1.  She denies any cardiovascular complaints such as chest pain, chest pressure, shortness of breath, orthopnea or paroxysmal nocturnal dyspnea.  She denies ever having prior cardiac issues.      A 12-lead ECG was performed showing inferolateral ST depression.  Her transthoracic echocardiogram shows inferolateral hypokinesis, but her chest x-ray is unremarkable.  In reviewing the chart, she clearly has urinary tract infection and elevated procalcitonin.  She is receiving IV ceftriaxone at this time.      PAST MEDICAL HISTORY:  Significant for hypertension and macular degeneration.      FAMILY HISTORY:  Significant for father who passed away of heart disease.      SOCIAL HISTORY:  She quit smoking 20 years ago, but has an approximate 20-pack-year history of smoking.      ALLERGIES:  Macrobid.      CURRENT MEDICATIONS:  Reviewed in Epic.  Please see Epic for a full list of her current medications and prior medications.      REVIEW OF SYSTEMS:  I did a complete 10-point review of systems that is unremarkable except for what is stated in the HPI.      PHYSICAL EXAMINATION:   IN GENERAL:  This is an elderly female who is in no acute distress.   VITAL SIGNS:  Temperature is 35.9, pulse is 91, blood pressure is 159/72.   HEART:  Regular rate and rhythm.   LUNGS:  Clear.  There are no crackles or wheezing.   ABDOMEN:  Soft and  nontender.   EXTREMITIES:  Lower extremities show no edema.   NEUROLOGIC:  She is alert and oriented x3.   PSYCHIATRIC:  Her affect is normal.   MUSCULOSKELETAL:  Does not any gross abnormalities of her major joints.   DERMATOLOGIC:  There are not any rashes or ecchymoses on exposed areas of skin.      IMPRESSION AND PLAN:  Ms. Mcdaniels is an 86-year-old female admitted with a fairly significant urinary tract infection and an elevated troponin consistent with demand myocardial infarction.  Her electrocardiogram and echocardiogram are both abnormal suggesting underlying coronary artery disease.  Fortunately her troponin has trended down and again she is asymptomatic from a cardiovascular standpoint.  At this time, I would recommend medical management and I will plan to reassess the patient tomorrow.  If her urinary tract infection treatment goes well, then we will plan on a cardiac catheterization on Wednesday.  Per the Internal Medicine note, they are going to perform a smear to evaluate her thrombocytopenia, and we will await the results of the smear before proceeding to cardiac catheterization.      Thank you for this consultation.         LAURA FRIEDMAN MD             D: 2017 13:31   T: 2017 13:56   MT: MARIO#[id number]      Name:     MAGALY MCDANIELS   MRN:      [id number]        Account:       [id number]   :      10/11/1930           Consult Date:  2017      Document: [id number]       cc: Shaan Harden MD

## 2017-05-29 NOTE — PLAN OF CARE
Problem: Cardiac Output Decreased (Adult)  Goal: Identify Related Risk Factors and Signs and Symptoms  Related risk factors and signs and symptoms are identified upon initiation of Human Response Clinical Practice Guideline (CPG)   Outcome: No Change  BP elevated, other VSS this shift. Last /81, MD paged, see corresponding note. Denies dyspnea, SOB, n/v overnight. C/O HA, prn tylenol given. Hep gtt infusing at 6.5 u/hr to PIV, no changes overnight. Ambulates to bathroom with SBA, frequently voiding, have BM overnight. Tele in place, NSR. Bed alarms in use, pt not attempting to exit bed. Alert and oriented, able to make needs known. Continue to monitor.

## 2017-05-29 NOTE — PLAN OF CARE
Problem: Goal Outcome Summary  Goal: Goal Outcome Summary  Outcome: Improving  Alert/oriented. Denies chest pain/heaviness or other s/sx. Tele NSR. Echo EF 55-60%, Mod PH, Mild/mod inferior lateral hypokinesis.  No edema. Up 1 assist to void numerous times. Rocephin for UTI. BP elevated in a.m. - home meds started, prn hydralazine if needed. Troponins trending down Heparin infusing per order. Blood morphology pending, increased procal.  BC pending. Coronary angiogram consent and [place] booklet provided to pt & family.

## 2017-05-29 NOTE — PLAN OF CARE
Problem: Goal Outcome Summary  Goal: Goal Outcome Summary  Outcome: No Change  Patient had a good evening. Vital signs stable although blood pressure elevated at times. Heparin infusing, HepXa WDL. Potassium recheck was WDL. Alert/orientated x4. Ambulating with assist of 1. Frequent urination. Continue POC.

## 2017-05-29 NOTE — PROGRESS NOTES
H+P dictated. Admitted with NSTEMI/demand ischemia/myocarditis along with ? UTI.  - On IV rocephin, IV heparin, BB.  - Will get echo and cardiology eval.

## 2017-05-29 NOTE — PROGRESS NOTES
[facility]  Hospitalist Progress Note  Name: Daya Ignacio    MRN: [id number]  Provider:  Shaan Harden MD  05/29/17    Initial presenting complaint/issue to hospital (Diagnosis): NSTEMI.         Assessment and Plan:      Summary of Stay: Daya Ignacio is a 86 year old female admitted on 5/28/2017 with NSTEMI and UTI. Pt with hx of HTN who presented with chills, weakness and fall. Found to have elevated troponin.      Problem List:     1. NSTEMI/demand ischemia or less likely myocarditis.  - Troponin peaked at 3.870.  - On ASA, isordil, UFH, metoprolol and lisinopril.  - TTE pending.  - Cardiology eval.    2. ? UTI.  - cx pending.  - Procalcitonin markedly elevated.  - On IV rocephin.    3. HTN.  - Resume metoprolol, lisinopril and clonidine.    4. Thrombocytopenia.  - Chronic.  - Check smear.  - Monitor.    DVT Prophylaxis:  -  UFH.  Code Status: Full Code  Discharge Dispo: home.  Estimated Disch Date / # of Days until Discharge: 2 days.        Interval History:        No fevers/chills/chest pain/SOB. + urinary urgency and frequency.                  Physical Exam:      Last Vital Signs:  Temp: 96.6  F (35.9  C) Temp src: Oral BP: 181/78 Pulse: 91 Heart Rate: 71 Resp: 18 SpO2: 96 % O2 Device: None (Room air) Oxygen Delivery: 2 LPM    Intake/Output Summary (Last 24 hours) at 05/29/17 0800  Last data filed at 05/29/17 0035   Gross per 24 hour   Intake              300 ml   Output                0 ml   Net              300 ml     I/O last 3 completed shifts:  In: 300 [P.O.:300]  Out: -   Vitals:    05/28/17 1345 05/28/17 2126 05/29/17 0431   Weight: 57.6 kg (127 lb) 59 kg (130 lb) 58.8 kg (129 lb 9.6 oz)     GENERALLY:  She is alert, awake, oriented, coherent, nontoxic, in no acute distress.  HEENT:  She has got missing teeth on her lower jaw.  Pharynx, there is no exudate.  There is no tender anterior cervical lymphadenopathy.   NECK:  Supple.  There is no palpable lymphadenopathy.   LUNGS:  Clear to  auscultation bilaterally.   HEART:  Regular rate, S1, S2 normal.  No murmurs or gallops.   ABDOMEN:  Soft, nontender with good bowel sounds.   EXTREMITIES:  There is no edema.   SKIN:  There is no rash.   NEUROLOGIC:  Cranial nerves II-XII are grossly within normal limits, though she has decreased visual acuity.  Motor strength is 5/5 in all extremities.  There is no pain with range of motion of the neck.            Medications:      All current medications were reviewed.         Data:      All new lab and imaging data was reviewed.   Labs:    Recent Labs  Lab 05/28/17  1800 05/28/17  1402   CULT No growth after 10 hours Pending       Recent Labs  Lab 05/29/17  0727 05/28/17  1115   WBC 9.4 11.0   HGB 12.9 13.8   HCT 39.3 41.6   MCV 91 91   * 114*       Recent Labs  Lab 05/29/17  0727 05/28/17  2230 05/28/17  1115     --  136   POTASSIUM 3.7 3.6 3.1*   CHLORIDE 100  --  100   CO2 28  --  27   ANIONGAP 6  --  9   *  --  114*   BUN 11  --  17   CR 0.50*  --  0.60   GFRESTIMATED >90Non  GFR Calc  --  >90Non  GFR Calc   GFRESTBLACK >90African American GFR Calc  --  >90African American GFR Calc   DAVE 8.9  --  9.0   MAG 1.9  --  1.9   PROTTOTAL  --   --  7.3   ALBUMIN  --   --  3.6   BILITOTAL  --   --  1.6*   ALKPHOS  --   --  69   AST  --   --  33   ALT  --   --  18       Recent Labs  Lab 05/29/17  0009 05/28/17  1800 05/28/17  1425   TROPI 2.095* 2.989* 3.870*       Recent Labs  Lab 05/28/17  1402   COLOR Yellow   APPEARANCE Cloudy   URINEGLC Negative   URINEBILI Negative   URINEKETONE 5*   SG 1.013   UBLD Large*   URINEPH 5.0   PROTEIN 30*   NITRITE Positive*   LEUKEST Moderate*   RBCU 17*   WBCU >182*      Recent Imaging:   Recent Results (from the past 24 hour(s))   CT Head w/o Contrast    Narrative    CT HEAD WITHOUT CONTRAST  5/28/2017 2:16 PM    HISTORY: Trauma.    COMPARISON: None.    TECHNIQUE: Routine transverse CT images of the head without  intravenous  contrast. Radiation dose for this scan was reduced using  automated exposure control, adjustment of the mA and/or kV according  to patient size, or iterative reconstruction technique.    FINDINGS: The sulci and ventricles are normal for the patient's age.  Areas of low attenuation are present in the white matter of the  cerebral hemispheres that are consistent with small vessel ischemic  disease.     There is no evidence of intracranial hemorrhage, mass, acute infarct  or anomaly. The visualized portions of the sinuses and mastoids appear  normal. No fracture is seen.      Impression    IMPRESSION: Age related changes and white matter changes consistent  with small vessel ischemic disease. No acute abnormality is seen.    CHRISTIANNE ANDRADE MD   CT Cervical Spine w/o Contrast    Narrative    CT CERVICAL SPINE WITHOUT CONTRAST   5/28/2017 2:17 PM    HISTORY: Pain after trauma.    COMPARISON: None.    TECHNIQUE: 0.2 cm helical imaging is performed through the cervical  spine without contrast. Radiation dose for this scan was reduced using  automated exposure control, adjustment of the mA and/or kV according  to patient size, or iterative reconstruction technique.     FINDINGS: Vertebral body alignment is normal with no fracture or  osseous lesion seen. There is marked disc height loss at C4-C5, C5-C6,  and C6-C7 with diffuse marginal osteophyte formation. There is less  extensive disc height loss elsewhere with no disc herniation seen.  There is moderate foraminal stenosis on the left at C5-C6 and C6-C7.  No other high-grade stenosis is seen.      Impression    IMPRESSION: Degenerative changes with no fracture or other evidence of  acute injury.     CHRISTIANNE ANDRADE MD   XR Chest 2 Views    Narrative    CHEST TWO VIEWS    5/28/2017 4:50 PM     HISTORY: Chest pain, shortness of breath.    COMPARISON: 6/23/2011.      Impression    IMPRESSION: No active disease.     MINA CURRY MD

## 2017-05-30 LAB
BASOPHILS # BLD AUTO: 0 10E9/L (ref 0–0.2)
BASOPHILS NFR BLD AUTO: 0.2 %
C DIFF TOX B STL QL: NORMAL
COPATH REPORT: NORMAL
DIFFERENTIAL METHOD BLD: ABNORMAL
EOSINOPHIL # BLD AUTO: 0 10E9/L (ref 0–0.7)
EOSINOPHIL NFR BLD AUTO: 0.5 %
ERYTHROCYTE [DISTWIDTH] IN BLOOD BY AUTOMATED COUNT: 13.7 % (ref 10–15)
HCT VFR BLD AUTO: 36.4 % (ref 35–47)
HGB BLD-MCNC: 12.3 G/DL (ref 11.7–15.7)
IMM GRANULOCYTES # BLD: 0 10E9/L (ref 0–0.4)
IMM GRANULOCYTES NFR BLD: 0.7 %
LMWH PPP CHRO-ACNC: 0.13 IU/ML
LMWH PPP CHRO-ACNC: 0.49 IU/ML
LYMPHOCYTES # BLD AUTO: 0.7 10E9/L (ref 0.8–5.3)
LYMPHOCYTES NFR BLD AUTO: 11.1 %
MCH RBC QN AUTO: 30.4 PG (ref 26.5–33)
MCHC RBC AUTO-ENTMCNC: 33.8 G/DL (ref 31.5–36.5)
MCV RBC AUTO: 90 FL (ref 78–100)
MONOCYTES # BLD AUTO: 0.5 10E9/L (ref 0–1.3)
MONOCYTES NFR BLD AUTO: 8.9 %
NEUTROPHILS # BLD AUTO: 4.8 10E9/L (ref 1.6–8.3)
NEUTROPHILS NFR BLD AUTO: 78.6 %
NRBC # BLD AUTO: 0 10*3/UL
NRBC BLD AUTO-RTO: 0 /100
PLATELET # BLD AUTO: 105 10E9/L (ref 150–450)
RBC # BLD AUTO: 4.05 10E12/L (ref 3.8–5.2)
SPECIMEN SOURCE: NORMAL
WBC # BLD AUTO: 6.1 10E9/L (ref 4–11)

## 2017-05-30 PROCEDURE — 99233 SBSQ HOSP IP/OBS HIGH 50: CPT | Performed by: INTERNAL MEDICINE

## 2017-05-30 PROCEDURE — A9270 NON-COVERED ITEM OR SERVICE: HCPCS | Mod: GY | Performed by: INTERNAL MEDICINE

## 2017-05-30 PROCEDURE — 25000132 ZZH RX MED GY IP 250 OP 250 PS 637: Mod: GY | Performed by: INTERNAL MEDICINE

## 2017-05-30 PROCEDURE — 36415 COLL VENOUS BLD VENIPUNCTURE: CPT | Performed by: INTERNAL MEDICINE

## 2017-05-30 PROCEDURE — 85520 HEPARIN ASSAY: CPT | Performed by: INTERNAL MEDICINE

## 2017-05-30 PROCEDURE — 85025 COMPLETE CBC W/AUTO DIFF WBC: CPT | Performed by: INTERNAL MEDICINE

## 2017-05-30 PROCEDURE — 87493 C DIFF AMPLIFIED PROBE: CPT | Performed by: INTERNAL MEDICINE

## 2017-05-30 PROCEDURE — 12000007 ZZH R&B INTERMEDIATE

## 2017-05-30 PROCEDURE — 25000128 H RX IP 250 OP 636: Performed by: INTERNAL MEDICINE

## 2017-05-30 RX ORDER — AMLODIPINE BESYLATE 5 MG/1
5 TABLET ORAL DAILY
Status: DISCONTINUED | OUTPATIENT
Start: 2017-05-30 | End: 2017-05-31

## 2017-05-30 RX ORDER — CLONIDINE HYDROCHLORIDE 0.1 MG/1
0.1 TABLET ORAL 4 TIMES DAILY
Status: DISCONTINUED | OUTPATIENT
Start: 2017-05-30 | End: 2017-06-01 | Stop reason: HOSPADM

## 2017-05-30 RX ADMIN — Medication 1500 UNITS: at 11:20

## 2017-05-30 RX ADMIN — CEFTRIAXONE SODIUM 1 G: 1 INJECTION, POWDER, FOR SOLUTION INTRAMUSCULAR; INTRAVENOUS at 16:16

## 2017-05-30 RX ADMIN — ACETAMINOPHEN 650 MG: 325 TABLET, FILM COATED ORAL at 09:18

## 2017-05-30 RX ADMIN — ACETAMINOPHEN 650 MG: 325 TABLET, FILM COATED ORAL at 16:16

## 2017-05-30 RX ADMIN — AMLODIPINE BESYLATE 5 MG: 5 TABLET ORAL at 08:14

## 2017-05-30 RX ADMIN — ISOSORBIDE DINITRATE 10 MG: 10 TABLET ORAL at 16:16

## 2017-05-30 RX ADMIN — ATORVASTATIN CALCIUM 10 MG: 10 TABLET, FILM COATED ORAL at 07:31

## 2017-05-30 RX ADMIN — LISINOPRIL 40 MG: 40 TABLET ORAL at 07:32

## 2017-05-30 RX ADMIN — METOPROLOL SUCCINATE 100 MG: 100 TABLET, EXTENDED RELEASE ORAL at 07:32

## 2017-05-30 RX ADMIN — CLONIDINE HYDROCHLORIDE 0.1 MG: 0.1 TABLET ORAL at 18:17

## 2017-05-30 RX ADMIN — CLONIDINE HYDROCHLORIDE 0.1 MG: 0.1 TABLET ORAL at 07:32

## 2017-05-30 RX ADMIN — CLONIDINE HYDROCHLORIDE 0.1 MG: 0.1 TABLET ORAL at 12:07

## 2017-05-30 RX ADMIN — ENOXAPARIN SODIUM 40 MG: 40 INJECTION SUBCUTANEOUS at 13:39

## 2017-05-30 RX ADMIN — ACETAMINOPHEN 650 MG: 325 TABLET, FILM COATED ORAL at 22:18

## 2017-05-30 RX ADMIN — CLONIDINE HYDROCHLORIDE 0.1 MG: 0.1 TABLET ORAL at 21:40

## 2017-05-30 RX ADMIN — ISOSORBIDE DINITRATE 10 MG: 10 TABLET ORAL at 21:41

## 2017-05-30 RX ADMIN — ISOSORBIDE DINITRATE 10 MG: 10 TABLET ORAL at 07:31

## 2017-05-30 RX ADMIN — ASPIRIN 81 MG 81 MG: 81 TABLET ORAL at 07:32

## 2017-05-30 ASSESSMENT — VISUAL ACUITY: OU: NORMAL ACUITY

## 2017-05-30 NOTE — PLAN OF CARE
Problem: Goal Outcome Summary  Goal: Goal Outcome Summary  Outcome: No Change  Vss and afebrile. BP elevated, parameters for PRNs not met. O2 sats stable on RA. Pt denies CP, SOB, N/V, pain. Hep gtt currently at 700u/hr. A&Ox4, up SBA. Tele NSR. Urinating very frequently. Cont with POC.

## 2017-05-30 NOTE — PROGRESS NOTES
[facility]  Hospitalist Progress Note  Name: Daya Ignacio    MRN: [id number]  Provider:  Shaan Harden MD  05/30/17    Initial presenting complaint/issue to hospital (Diagnosis): NSTEMI.         Assessment and Plan:      Summary of Stay: Daya Ignacio is a 86 year old female admitted on 5/28/2017 with NSTEMI and UTI. Pt with hx of HTN who presented with chills, weakness and fall. Found to have elevated troponin.       Problem List:      1. NSTEMI/demand ischemia or less likely myocarditis.  - Troponin peaked at 3.870.  - On ASA, isordil, UFH, metoprolol and lisinopril.  - TTE showed nml EF with inferolateral hypokinesis and moderate pulm HTN .  - Cardiology eval appreciated.  - Cath 5/31.     2. UTI.  - Cx showed E.coli.  - Procalcitonin markedly elevated.  - On IV rocephin.     3. HTN.  - Resume metoprolol, lisinopril and clonidine.  - BP still elevated, will add norvasc.     4. Thrombocytopenia.  - Chronic.  - Check smear.  - Monitor.     DVT Prophylaxis:  -  UFH.  Code Status: Full Code  Discharge Dispo: home.  Estimated Disch Date / # of Days until Discharge: 2 days.              Interval History:        No chest pain/SOB. No emesis. Some loose stools. No fevers.                  Physical Exam:      Last Vital Signs:  Temp: 95.9  F (35.5  C) Temp src: Oral BP: (!) 226/102   Heart Rate: 86 Resp: 18 SpO2: 93 % O2 Device: None (Room air)      Intake/Output Summary (Last 24 hours) at 05/30/17 0812  Last data filed at 05/30/17 0651   Gross per 24 hour   Intake              727 ml   Output              250 ml   Net              477 ml     I/O last 3 completed shifts:  In: 727 [P.O.:680; I.V.:47]  Out: 250 [Urine:250]  Vitals:    05/28/17 1345 05/28/17 2126 05/29/17 0431 05/30/17 0300   Weight: 57.6 kg (127 lb) 59 kg (130 lb) 58.8 kg (129 lb 9.6 oz) 57.3 kg (126 lb 4.8 oz)     GENERALLY:  She is alert, awake, oriented, coherent, nontoxic, in no acute distress.  HEENT:  She has got missing teeth on her  lower jaw.  Pharynx, there is no exudate.  There is no tender anterior cervical lymphadenopathy.   NECK:  Supple.  There is no palpable lymphadenopathy.   LUNGS:  Clear to auscultation bilaterally.   HEART:  Regular rate, S1, S2 normal.  No murmurs or gallops.   ABDOMEN:  Soft, nontender with good bowel sounds.   EXTREMITIES:  There is no edema.   SKIN:  There is no rash.   NEUROLOGIC:  Cranial nerves II-XII are grossly within normal limits, though she has decreased visual acuity.  Motor strength is 5/5 in all extremities.  There is no pain with range of motion of the neck.            Medications:      All current medications were reviewed.         Data:      All new lab and imaging data was reviewed.   Labs:    Recent Labs  Lab 05/28/17  1800 05/28/17  1402   CULT No growth after 2 days >100,000 colonies/mL Escherichia coli*       Recent Labs  Lab 05/30/17  0643 05/29/17  0727 05/28/17  1115   WBC 6.1 9.4 11.0   HGB 12.3 12.9 13.8   HCT 36.4 39.3 41.6   MCV 90 91 91   * 105* 114*       Recent Labs  Lab 05/29/17  0727 05/28/17  2230 05/28/17  1115     --  136   POTASSIUM 3.7 3.6 3.1*   CHLORIDE 100  --  100   CO2 28  --  27   ANIONGAP 6  --  9   *  --  114*   BUN 11  --  17   CR 0.50*  --  0.60   GFRESTIMATED >90Non  GFR Calc  --  >90Non  GFR Calc   GFRESTBLACK >90African American GFR Calc  --  >90African American GFR Calc   DAVE 8.9  --  9.0   MAG 1.9  --  1.9   PROTTOTAL  --   --  7.3   ALBUMIN  --   --  3.6   BILITOTAL  --   --  1.6*   ALKPHOS  --   --  69   AST  --   --  33   ALT  --   --  18      Recent Imaging:   No results found for this or any previous visit (from the past 24 hour(s)).

## 2017-05-30 NOTE — PLAN OF CARE
Problem: Cardiac Output Decreased (Adult)  Goal: Identify Related Risk Factors and Signs and Symptoms  Related risk factors and signs and symptoms are identified upon initiation of Human Response Clinical Practice Guideline (CPG)   Outcome: Improving  VSS with exception of SBP's elevated, resumed meds today.  Heparin gtt increased to 850/U, recheck at 0100.  Stools loose, now on Enteric precautions to r/o C-diff.  HA, relief with Tylenol.  Up to BR with SBA.  Appetite decreased.  POC reviewed with pt & son, questions answered.

## 2017-05-30 NOTE — PLAN OF CARE
Denies any chest discomfort.  SL IV after heparin dced.   Lovonox started .  Possible heart cath tomorrow. BP  elevated this am but coming down after am meds given and new meds ordered , were given.  Steady on feet.  Independent to SBA.  A couple of loose stools this shift . C-diff negative.  Tele SR

## 2017-05-30 NOTE — PROGRESS NOTES
"Cardiology Progress Note  May 30, 2017    Daya Ignacio  [id number]    Subjective:  The patient has no cardiac complaints this morning.  She denies any chest pain or SOB.      Her peripheral smear showed mild thrombocytopenia.      Tele: SR      Intake/Output Summary (Last 24 hours) at 05/30/17 1109  Last data filed at 05/30/17 1010   Gross per 24 hour   Intake              607 ml   Output              300 ml   Net              307 ml       Wt Readings from Last 4 Encounters:   05/30/17 57.3 kg (126 lb 4.8 oz)   01/13/17 61.6 kg (135 lb 12.8 oz)   07/08/16 63.4 kg (139 lb 12.8 oz)   01/22/16 64.9 kg (143 lb)       Past Medical History:   Diagnosis Date     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 4/16/2014     Closed fracture of unspecified part of neck of femur      Closed fracture of unspecified part of vertebral column without mention of spinal cord injury     from fall summer 06     Elevated blood pressure reading without diagnosis of hypertension      Hypertension      Macular degeneration (senile) of retina, unspecified        Current Facility-Administered Medications   Medication Dose Route Frequency     amLODIPine  5 mg Oral Daily     heparin Loading Dose  1,500 Units Intravenous Once     cloNIDine  0.1 mg Oral 4x Daily     [START ON 5/31/2017] metoprolol  150 mg Oral Daily     lisinopril  40 mg Oral Daily     aspirin  81 mg Oral Daily     atorvastatin  10 mg Oral Daily     isosorbide dinitrate  10 mg Oral TID     cefTRIAXone  1 g Intravenous Q24H         PE:  NAD  /76 (BP Location: Left arm)  Pulse 91  Temp 95.9  F (35.5  C) (Oral)  Resp 18  Ht 1.626 m (5' 4\")  Wt 57.3 kg (126 lb 4.8 oz)  SpO2 93%  BMI 21.68 kg/m2  No JVD  Regular rate and rhythm without murmurs, rubs, or gallops  Clear without crackles or wheezing  Soft, NT  Alert and oriented  No LE edema  Affect normal    Last Basic Metabolic Panel:  Lab Results   Component Value Date     05/29/2017      Lab Results   Component " Value Date    POTASSIUM 3.7 05/29/2017     Lab Results   Component Value Date    CHLORIDE 100 05/29/2017     Lab Results   Component Value Date    DAVE 8.9 05/29/2017     Lab Results   Component Value Date    CO2 28 05/29/2017     Lab Results   Component Value Date    BUN 11 05/29/2017     Lab Results   Component Value Date    CR 0.50 05/29/2017     Lab Results   Component Value Date     05/29/2017       Lab Results   Component Value Date    TROPI 2.095 (HH) 05/29/2017    TROPI 2.989 (HH) 05/28/2017    TROPI 3.870 (HH) 05/28/2017    TROPI 3.791 (HH) 05/28/2017    TROPI 0.023 06/24/2011       Lab Results   Component Value Date    WBC 6.1 05/30/2017     Lab Results   Component Value Date    RBC 4.05 05/30/2017     Lab Results   Component Value Date    HGB 12.3 05/30/2017     Lab Results   Component Value Date    HCT 36.4 05/30/2017     No components found for: MCT  Lab Results   Component Value Date    MCV 90 05/30/2017     Lab Results   Component Value Date    MCH 30.4 05/30/2017     Lab Results   Component Value Date    MCHC 33.8 05/30/2017     Lab Results   Component Value Date    RDW 13.7 05/30/2017     Lab Results   Component Value Date     05/30/2017       Assessment/Plan:  86 year old admitted with a UTI which has resulted in a demand MI.  Her TTE shows inferolateral hypokinesis and at this time as her peripheral smear does not show any significant abnormalities we will plan on a cardiac cath tomorrow.      D/C IV UFH and add DVT prophylaxis as her troponins have trended down and she is asymptomatic.    Agree with addition of Norvasc by the primary service.    NPO after midnight at this time for probable cath tomorrow.    Deacon Fleming MD

## 2017-05-31 ENCOUNTER — APPOINTMENT (OUTPATIENT)
Dept: CARDIOLOGY | Facility: CLINIC | Age: 82
DRG: 281 | End: 2017-05-31
Attending: PHYSICIAN ASSISTANT
Payer: MEDICARE

## 2017-05-31 LAB
BUN SERPL-MCNC: 7 MG/DL (ref 7–30)
CALCIUM SERPL-MCNC: 9.1 MG/DL (ref 8.5–10.1)
CHLORIDE SERPL-SCNC: 101 MMOL/L (ref 94–109)
CO2 SERPL-SCNC: 29 MMOL/L (ref 20–32)
CREAT SERPL-MCNC: 0.47 MG/DL (ref 0.52–1.04)
GFR SERPL CREATININE-BSD FRML MDRD: ABNORMAL ML/MIN/1.7M2
GLUCOSE SERPL-MCNC: 100 MG/DL (ref 70–99)
PLATELET # BLD AUTO: 115 10E9/L (ref 150–450)
POTASSIUM SERPL-SCNC: 3 MMOL/L (ref 3.4–5.3)
POTASSIUM SERPL-SCNC: 3.2 MMOL/L (ref 3.4–5.3)
POTASSIUM SERPL-SCNC: 3.6 MMOL/L (ref 3.4–5.3)
SODIUM SERPL-SCNC: 138 MMOL/L (ref 133–144)

## 2017-05-31 PROCEDURE — C1769 GUIDE WIRE: HCPCS

## 2017-05-31 PROCEDURE — A9270 NON-COVERED ITEM OR SERVICE: HCPCS | Mod: GY

## 2017-05-31 PROCEDURE — 93458 L HRT ARTERY/VENTRICLE ANGIO: CPT

## 2017-05-31 PROCEDURE — A9270 NON-COVERED ITEM OR SERVICE: HCPCS | Mod: GY | Performed by: INTERNAL MEDICINE

## 2017-05-31 PROCEDURE — 36415 COLL VENOUS BLD VENIPUNCTURE: CPT | Performed by: INTERNAL MEDICINE

## 2017-05-31 PROCEDURE — 99152 MOD SED SAME PHYS/QHP 5/>YRS: CPT | Performed by: INTERNAL MEDICINE

## 2017-05-31 PROCEDURE — 27210856 ZZH ACCESS HEART CATH CR2

## 2017-05-31 PROCEDURE — 25000132 ZZH RX MED GY IP 250 OP 250 PS 637: Mod: GY | Performed by: INTERNAL MEDICINE

## 2017-05-31 PROCEDURE — A9270 NON-COVERED ITEM OR SERVICE: HCPCS | Mod: GY | Performed by: PHYSICIAN ASSISTANT

## 2017-05-31 PROCEDURE — 25000128 H RX IP 250 OP 636: Performed by: INTERNAL MEDICINE

## 2017-05-31 PROCEDURE — 27210827 ZZH KIT ACIST INJECTOR CR6

## 2017-05-31 PROCEDURE — 27210742 ZZH CATH CR1

## 2017-05-31 PROCEDURE — 27210946 ZZH KIT HC TOTES DISP CR8

## 2017-05-31 PROCEDURE — 99232 SBSQ HOSP IP/OBS MODERATE 35: CPT | Performed by: INTERNAL MEDICINE

## 2017-05-31 PROCEDURE — 25000128 H RX IP 250 OP 636

## 2017-05-31 PROCEDURE — 25000132 ZZH RX MED GY IP 250 OP 250 PS 637: Mod: GY | Performed by: PHYSICIAN ASSISTANT

## 2017-05-31 PROCEDURE — 4A023N7 MEASUREMENT OF CARDIAC SAMPLING AND PRESSURE, LEFT HEART, PERCUTANEOUS APPROACH: ICD-10-PCS | Performed by: INTERNAL MEDICINE

## 2017-05-31 PROCEDURE — 99152 MOD SED SAME PHYS/QHP 5/>YRS: CPT

## 2017-05-31 PROCEDURE — 99153 MOD SED SAME PHYS/QHP EA: CPT

## 2017-05-31 PROCEDURE — 25000125 ZZHC RX 250

## 2017-05-31 PROCEDURE — 25000128 H RX IP 250 OP 636: Performed by: PHYSICIAN ASSISTANT

## 2017-05-31 PROCEDURE — 84132 ASSAY OF SERUM POTASSIUM: CPT | Performed by: INTERNAL MEDICINE

## 2017-05-31 PROCEDURE — 25000132 ZZH RX MED GY IP 250 OP 250 PS 637: Mod: GY

## 2017-05-31 PROCEDURE — 93458 L HRT ARTERY/VENTRICLE ANGIO: CPT | Mod: 26 | Performed by: INTERNAL MEDICINE

## 2017-05-31 PROCEDURE — 12000007 ZZH R&B INTERMEDIATE

## 2017-05-31 PROCEDURE — 99233 SBSQ HOSP IP/OBS HIGH 50: CPT | Mod: 25 | Performed by: INTERNAL MEDICINE

## 2017-05-31 PROCEDURE — B2111ZZ FLUOROSCOPY OF MULTIPLE CORONARY ARTERIES USING LOW OSMOLAR CONTRAST: ICD-10-PCS | Performed by: INTERNAL MEDICINE

## 2017-05-31 PROCEDURE — 80048 BASIC METABOLIC PNL TOTAL CA: CPT | Performed by: INTERNAL MEDICINE

## 2017-05-31 PROCEDURE — 85049 AUTOMATED PLATELET COUNT: CPT | Performed by: INTERNAL MEDICINE

## 2017-05-31 PROCEDURE — 99153 MOD SED SAME PHYS/QHP EA: CPT | Performed by: INTERNAL MEDICINE

## 2017-05-31 PROCEDURE — C1894 INTRO/SHEATH, NON-LASER: HCPCS

## 2017-05-31 RX ORDER — NALOXONE HYDROCHLORIDE 0.4 MG/ML
.2-.4 INJECTION, SOLUTION INTRAMUSCULAR; INTRAVENOUS; SUBCUTANEOUS
Status: ACTIVE | OUTPATIENT
Start: 2017-05-31 | End: 2017-06-01

## 2017-05-31 RX ORDER — ASPIRIN 81 MG/1
81-324 TABLET, CHEWABLE ORAL
Status: DISCONTINUED | OUTPATIENT
Start: 2017-05-31 | End: 2017-05-31 | Stop reason: HOSPADM

## 2017-05-31 RX ORDER — VERAPAMIL HYDROCHLORIDE 2.5 MG/ML
INJECTION, SOLUTION INTRAVENOUS
Status: COMPLETED
Start: 2017-05-31 | End: 2017-05-31

## 2017-05-31 RX ORDER — TOLTERODINE TARTRATE 1 MG/1
1 TABLET, EXTENDED RELEASE ORAL 2 TIMES DAILY
Status: DISCONTINUED | OUTPATIENT
Start: 2017-05-31 | End: 2017-06-01 | Stop reason: HOSPADM

## 2017-05-31 RX ORDER — PROMETHAZINE HYDROCHLORIDE 25 MG/ML
6.25-25 INJECTION, SOLUTION INTRAMUSCULAR; INTRAVENOUS EVERY 4 HOURS PRN
Status: DISCONTINUED | OUTPATIENT
Start: 2017-05-31 | End: 2017-05-31 | Stop reason: HOSPADM

## 2017-05-31 RX ORDER — IOPAMIDOL 755 MG/ML
100 INJECTION, SOLUTION INTRAVASCULAR ONCE
Status: DISCONTINUED | OUTPATIENT
Start: 2017-05-31 | End: 2017-06-01 | Stop reason: HOSPADM

## 2017-05-31 RX ORDER — METOPROLOL TARTRATE 1 MG/ML
5 INJECTION, SOLUTION INTRAVENOUS EVERY 5 MIN PRN
Status: DISCONTINUED | OUTPATIENT
Start: 2017-05-31 | End: 2017-05-31 | Stop reason: HOSPADM

## 2017-05-31 RX ORDER — DEXTROSE MONOHYDRATE 25 G/50ML
12.5-5 INJECTION, SOLUTION INTRAVENOUS EVERY 30 MIN PRN
Status: DISCONTINUED | OUTPATIENT
Start: 2017-05-31 | End: 2017-05-31 | Stop reason: HOSPADM

## 2017-05-31 RX ORDER — LIDOCAINE HYDROCHLORIDE 10 MG/ML
30 INJECTION, SOLUTION EPIDURAL; INFILTRATION; INTRACAUDAL; PERINEURAL
Status: DISCONTINUED | OUTPATIENT
Start: 2017-05-31 | End: 2017-05-31 | Stop reason: HOSPADM

## 2017-05-31 RX ORDER — ASPIRIN 81 MG/1
81 TABLET ORAL DAILY
Status: DISCONTINUED | OUTPATIENT
Start: 2017-06-01 | End: 2017-06-01 | Stop reason: HOSPADM

## 2017-05-31 RX ORDER — VERAPAMIL HYDROCHLORIDE 2.5 MG/ML
1-2.5 INJECTION, SOLUTION INTRAVENOUS
Status: COMPLETED | OUTPATIENT
Start: 2017-05-31 | End: 2017-05-31

## 2017-05-31 RX ORDER — EPTIFIBATIDE 2 MG/ML
180 INJECTION, SOLUTION INTRAVENOUS EVERY 10 MIN PRN
Status: DISCONTINUED | OUTPATIENT
Start: 2017-05-31 | End: 2017-05-31 | Stop reason: HOSPADM

## 2017-05-31 RX ORDER — PROTAMINE SULFATE 10 MG/ML
25-100 INJECTION, SOLUTION INTRAVENOUS EVERY 5 MIN PRN
Status: DISCONTINUED | OUTPATIENT
Start: 2017-05-31 | End: 2017-05-31 | Stop reason: HOSPADM

## 2017-05-31 RX ORDER — NITROGLYCERIN 5 MG/ML
100-500 VIAL (ML) INTRAVENOUS
Status: COMPLETED | OUTPATIENT
Start: 2017-05-31 | End: 2017-05-31

## 2017-05-31 RX ORDER — NITROGLYCERIN 5 MG/ML
VIAL (ML) INTRAVENOUS
Status: COMPLETED
Start: 2017-05-31 | End: 2017-05-31

## 2017-05-31 RX ORDER — LIDOCAINE HYDROCHLORIDE 10 MG/ML
1-10 INJECTION, SOLUTION INFILTRATION; PERINEURAL
Status: DISCONTINUED | OUTPATIENT
Start: 2017-05-31 | End: 2017-05-31 | Stop reason: HOSPADM

## 2017-05-31 RX ORDER — NICARDIPINE HYDROCHLORIDE 2.5 MG/ML
100 INJECTION INTRAVENOUS
Status: DISCONTINUED | OUTPATIENT
Start: 2017-05-31 | End: 2017-05-31 | Stop reason: HOSPADM

## 2017-05-31 RX ORDER — FLUMAZENIL 0.1 MG/ML
0.2 INJECTION, SOLUTION INTRAVENOUS
Status: DISCONTINUED | OUTPATIENT
Start: 2017-05-31 | End: 2017-05-31 | Stop reason: HOSPADM

## 2017-05-31 RX ORDER — PRASUGREL 10 MG/1
10-60 TABLET, FILM COATED ORAL
Status: DISCONTINUED | OUTPATIENT
Start: 2017-05-31 | End: 2017-05-31 | Stop reason: HOSPADM

## 2017-05-31 RX ORDER — AMLODIPINE BESYLATE 10 MG/1
10 TABLET ORAL DAILY
Status: DISCONTINUED | OUTPATIENT
Start: 2017-06-01 | End: 2017-06-01 | Stop reason: HOSPADM

## 2017-05-31 RX ORDER — METHYLPREDNISOLONE SODIUM SUCCINATE 125 MG/2ML
125 INJECTION, POWDER, LYOPHILIZED, FOR SOLUTION INTRAMUSCULAR; INTRAVENOUS
Status: DISCONTINUED | OUTPATIENT
Start: 2017-05-31 | End: 2017-05-31 | Stop reason: HOSPADM

## 2017-05-31 RX ORDER — ATROPINE SULFATE 0.1 MG/ML
0.5 INJECTION INTRAVENOUS EVERY 5 MIN PRN
Status: ACTIVE | OUTPATIENT
Start: 2017-05-31 | End: 2017-06-01

## 2017-05-31 RX ORDER — SODIUM CHLORIDE 9 MG/ML
INJECTION, SOLUTION INTRAVENOUS CONTINUOUS
Status: DISCONTINUED | OUTPATIENT
Start: 2017-05-31 | End: 2017-05-31 | Stop reason: HOSPADM

## 2017-05-31 RX ORDER — NITROGLYCERIN 0.4 MG/1
0.4 TABLET SUBLINGUAL EVERY 5 MIN PRN
Status: DISCONTINUED | OUTPATIENT
Start: 2017-05-31 | End: 2017-05-31 | Stop reason: HOSPADM

## 2017-05-31 RX ORDER — NALOXONE HYDROCHLORIDE 0.4 MG/ML
.1-.4 INJECTION, SOLUTION INTRAMUSCULAR; INTRAVENOUS; SUBCUTANEOUS
Status: DISCONTINUED | OUTPATIENT
Start: 2017-05-31 | End: 2017-05-31

## 2017-05-31 RX ORDER — LORAZEPAM 2 MG/ML
0.5 INJECTION INTRAMUSCULAR
Status: COMPLETED | OUTPATIENT
Start: 2017-05-31 | End: 2017-05-31

## 2017-05-31 RX ORDER — FLUMAZENIL 0.1 MG/ML
0.2 INJECTION, SOLUTION INTRAVENOUS
Status: ACTIVE | OUTPATIENT
Start: 2017-05-31 | End: 2017-06-01

## 2017-05-31 RX ORDER — NIFEDIPINE 10 MG/1
10 CAPSULE ORAL
Status: DISCONTINUED | OUTPATIENT
Start: 2017-05-31 | End: 2017-05-31 | Stop reason: HOSPADM

## 2017-05-31 RX ORDER — NITROGLYCERIN 5 MG/ML
100-200 VIAL (ML) INTRAVENOUS
Status: DISCONTINUED | OUTPATIENT
Start: 2017-05-31 | End: 2017-05-31 | Stop reason: HOSPADM

## 2017-05-31 RX ORDER — EPTIFIBATIDE 2 MG/ML
2 INJECTION, SOLUTION INTRAVENOUS CONTINUOUS PRN
Status: DISCONTINUED | OUTPATIENT
Start: 2017-05-31 | End: 2017-05-31 | Stop reason: HOSPADM

## 2017-05-31 RX ORDER — AMLODIPINE BESYLATE 5 MG/1
5 TABLET ORAL ONCE
Status: COMPLETED | OUTPATIENT
Start: 2017-05-31 | End: 2017-05-31

## 2017-05-31 RX ORDER — DOBUTAMINE HYDROCHLORIDE 200 MG/100ML
2-20 INJECTION INTRAVENOUS CONTINUOUS PRN
Status: DISCONTINUED | OUTPATIENT
Start: 2017-05-31 | End: 2017-05-31 | Stop reason: HOSPADM

## 2017-05-31 RX ORDER — ATROPINE SULFATE 0.1 MG/ML
.5-1 INJECTION INTRAVENOUS
Status: DISCONTINUED | OUTPATIENT
Start: 2017-05-31 | End: 2017-05-31 | Stop reason: HOSPADM

## 2017-05-31 RX ORDER — ASPIRIN 81 MG/1
244 TABLET ORAL ONCE
Status: COMPLETED | OUTPATIENT
Start: 2017-05-31 | End: 2017-05-31

## 2017-05-31 RX ORDER — LORAZEPAM 0.5 MG/1
0.5 TABLET ORAL
Status: COMPLETED | OUTPATIENT
Start: 2017-05-31 | End: 2017-05-31

## 2017-05-31 RX ORDER — LIDOCAINE 40 MG/G
CREAM TOPICAL
Status: DISCONTINUED | OUTPATIENT
Start: 2017-05-31 | End: 2017-05-31 | Stop reason: HOSPADM

## 2017-05-31 RX ORDER — VERAPAMIL HYDROCHLORIDE 2.5 MG/ML
INJECTION, SOLUTION INTRAVENOUS
Status: DISCONTINUED
Start: 2017-05-31 | End: 2017-05-31 | Stop reason: HOSPADM

## 2017-05-31 RX ORDER — IOPAMIDOL 755 MG/ML
100 INJECTION, SOLUTION INTRAVASCULAR ONCE
Status: COMPLETED | OUTPATIENT
Start: 2017-05-31 | End: 2017-05-31

## 2017-05-31 RX ORDER — ENALAPRILAT 1.25 MG/ML
1.25-2.5 INJECTION INTRAVENOUS
Status: DISCONTINUED | OUTPATIENT
Start: 2017-05-31 | End: 2017-05-31 | Stop reason: HOSPADM

## 2017-05-31 RX ORDER — MORPHINE SULFATE 4 MG/ML
1-2 INJECTION, SOLUTION INTRAMUSCULAR; INTRAVENOUS EVERY 5 MIN PRN
Status: DISCONTINUED | OUTPATIENT
Start: 2017-05-31 | End: 2017-05-31 | Stop reason: HOSPADM

## 2017-05-31 RX ORDER — PROTAMINE SULFATE 10 MG/ML
1-5 INJECTION, SOLUTION INTRAVENOUS
Status: DISCONTINUED | OUTPATIENT
Start: 2017-05-31 | End: 2017-05-31 | Stop reason: HOSPADM

## 2017-05-31 RX ORDER — LIDOCAINE 40 MG/G
CREAM TOPICAL
Status: DISCONTINUED | OUTPATIENT
Start: 2017-05-31 | End: 2017-06-01 | Stop reason: HOSPADM

## 2017-05-31 RX ORDER — ACETAMINOPHEN 325 MG/1
325-650 TABLET ORAL EVERY 4 HOURS PRN
Status: DISCONTINUED | OUTPATIENT
Start: 2017-05-31 | End: 2017-06-01 | Stop reason: HOSPADM

## 2017-05-31 RX ORDER — ONDANSETRON 2 MG/ML
4 INJECTION INTRAMUSCULAR; INTRAVENOUS EVERY 4 HOURS PRN
Status: DISCONTINUED | OUTPATIENT
Start: 2017-05-31 | End: 2017-05-31 | Stop reason: HOSPADM

## 2017-05-31 RX ORDER — CLOPIDOGREL BISULFATE 75 MG/1
75 TABLET ORAL
Status: DISCONTINUED | OUTPATIENT
Start: 2017-05-31 | End: 2017-05-31 | Stop reason: HOSPADM

## 2017-05-31 RX ORDER — HYDROCODONE BITARTRATE AND ACETAMINOPHEN 5; 325 MG/1; MG/1
1-2 TABLET ORAL EVERY 4 HOURS PRN
Status: DISCONTINUED | OUTPATIENT
Start: 2017-05-31 | End: 2017-06-01 | Stop reason: HOSPADM

## 2017-05-31 RX ORDER — NALOXONE HYDROCHLORIDE 0.4 MG/ML
0.4 INJECTION, SOLUTION INTRAMUSCULAR; INTRAVENOUS; SUBCUTANEOUS EVERY 5 MIN PRN
Status: DISCONTINUED | OUTPATIENT
Start: 2017-05-31 | End: 2017-05-31 | Stop reason: HOSPADM

## 2017-05-31 RX ORDER — POTASSIUM CHLORIDE 29.8 MG/ML
20 INJECTION INTRAVENOUS
Status: DISCONTINUED | OUTPATIENT
Start: 2017-05-31 | End: 2017-05-31 | Stop reason: HOSPADM

## 2017-05-31 RX ORDER — POTASSIUM CHLORIDE 1500 MG/1
20 TABLET, EXTENDED RELEASE ORAL
Status: COMPLETED | OUTPATIENT
Start: 2017-05-31 | End: 2017-05-31

## 2017-05-31 RX ORDER — FUROSEMIDE 10 MG/ML
20-100 INJECTION INTRAMUSCULAR; INTRAVENOUS
Status: DISCONTINUED | OUTPATIENT
Start: 2017-05-31 | End: 2017-05-31 | Stop reason: HOSPADM

## 2017-05-31 RX ORDER — HYDRALAZINE HYDROCHLORIDE 20 MG/ML
10-20 INJECTION INTRAMUSCULAR; INTRAVENOUS
Status: DISCONTINUED | OUTPATIENT
Start: 2017-05-31 | End: 2017-05-31 | Stop reason: HOSPADM

## 2017-05-31 RX ORDER — DOPAMINE HYDROCHLORIDE 160 MG/100ML
2-20 INJECTION, SOLUTION INTRAVENOUS CONTINUOUS PRN
Status: DISCONTINUED | OUTPATIENT
Start: 2017-05-31 | End: 2017-05-31 | Stop reason: HOSPADM

## 2017-05-31 RX ORDER — HEPARIN SODIUM 1000 [USP'U]/ML
INJECTION, SOLUTION INTRAVENOUS; SUBCUTANEOUS
Status: COMPLETED
Start: 2017-05-31 | End: 2017-05-31

## 2017-05-31 RX ORDER — FENTANYL CITRATE 50 UG/ML
INJECTION, SOLUTION INTRAMUSCULAR; INTRAVENOUS
Status: COMPLETED
Start: 2017-05-31 | End: 2017-05-31

## 2017-05-31 RX ORDER — CLOPIDOGREL 300 MG/1
300-600 TABLET, FILM COATED ORAL
Status: DISCONTINUED | OUTPATIENT
Start: 2017-05-31 | End: 2017-05-31 | Stop reason: HOSPADM

## 2017-05-31 RX ORDER — FENTANYL CITRATE 50 UG/ML
25-50 INJECTION, SOLUTION INTRAMUSCULAR; INTRAVENOUS
Status: DISCONTINUED | OUTPATIENT
Start: 2017-05-31 | End: 2017-05-31

## 2017-05-31 RX ORDER — FENTANYL CITRATE 50 UG/ML
25-50 INJECTION, SOLUTION INTRAMUSCULAR; INTRAVENOUS
Status: DISCONTINUED | OUTPATIENT
Start: 2017-05-31 | End: 2017-05-31 | Stop reason: HOSPADM

## 2017-05-31 RX ORDER — BUPIVACAINE HYDROCHLORIDE 2.5 MG/ML
1-10 INJECTION, SOLUTION EPIDURAL; INFILTRATION; INTRACAUDAL
Status: DISCONTINUED | OUTPATIENT
Start: 2017-05-31 | End: 2017-05-31 | Stop reason: HOSPADM

## 2017-05-31 RX ORDER — NITROGLYCERIN 20 MG/100ML
.07-2 INJECTION INTRAVENOUS CONTINUOUS PRN
Status: DISCONTINUED | OUTPATIENT
Start: 2017-05-31 | End: 2017-05-31 | Stop reason: HOSPADM

## 2017-05-31 RX ORDER — HEPARIN SODIUM 1000 [USP'U]/ML
1000-10000 INJECTION, SOLUTION INTRAVENOUS; SUBCUTANEOUS EVERY 5 MIN PRN
Status: DISCONTINUED | OUTPATIENT
Start: 2017-05-31 | End: 2017-05-31 | Stop reason: HOSPADM

## 2017-05-31 RX ORDER — SODIUM NITROPRUSSIDE 25 MG/ML
100-200 INJECTION INTRAVENOUS
Status: DISCONTINUED | OUTPATIENT
Start: 2017-05-31 | End: 2017-05-31 | Stop reason: HOSPADM

## 2017-05-31 RX ORDER — DIPHENHYDRAMINE HYDROCHLORIDE 50 MG/ML
25-50 INJECTION INTRAMUSCULAR; INTRAVENOUS
Status: DISCONTINUED | OUTPATIENT
Start: 2017-05-31 | End: 2017-05-31 | Stop reason: HOSPADM

## 2017-05-31 RX ORDER — PHENYLEPHRINE HCL IN 0.9% NACL 1 MG/10 ML
20-100 SYRINGE (ML) INTRAVENOUS
Status: DISCONTINUED | OUTPATIENT
Start: 2017-05-31 | End: 2017-05-31 | Stop reason: HOSPADM

## 2017-05-31 RX ORDER — SODIUM CHLORIDE 9 MG/ML
INJECTION, SOLUTION INTRAVENOUS CONTINUOUS
Status: DISCONTINUED | OUTPATIENT
Start: 2017-05-31 | End: 2017-05-31

## 2017-05-31 RX ORDER — LORAZEPAM 2 MG/ML
.5-2 INJECTION INTRAMUSCULAR EVERY 4 HOURS PRN
Status: DISCONTINUED | OUTPATIENT
Start: 2017-05-31 | End: 2017-05-31 | Stop reason: HOSPADM

## 2017-05-31 RX ORDER — POTASSIUM CHLORIDE 7.45 MG/ML
10 INJECTION INTRAVENOUS
Status: DISCONTINUED | OUTPATIENT
Start: 2017-05-31 | End: 2017-05-31 | Stop reason: HOSPADM

## 2017-05-31 RX ORDER — ADENOSINE 3 MG/ML
12-12000 INJECTION, SOLUTION INTRAVENOUS
Status: DISCONTINUED | OUTPATIENT
Start: 2017-05-31 | End: 2017-05-31 | Stop reason: HOSPADM

## 2017-05-31 RX ADMIN — FENTANYL CITRATE 75 MCG: 50 INJECTION, SOLUTION INTRAMUSCULAR; INTRAVENOUS at 15:15

## 2017-05-31 RX ADMIN — Medication 500 MCG: at 15:16

## 2017-05-31 RX ADMIN — LORAZEPAM 0.5 MG: 0.5 TABLET ORAL at 11:23

## 2017-05-31 RX ADMIN — MIDAZOLAM 1.5 MG: 1 INJECTION INTRAMUSCULAR; INTRAVENOUS at 15:14

## 2017-05-31 RX ADMIN — POTASSIUM CHLORIDE 20 MEQ: 1500 TABLET, EXTENDED RELEASE ORAL at 13:11

## 2017-05-31 RX ADMIN — HEPARIN SODIUM 5000 UNITS: 1000 INJECTION, SOLUTION INTRAVENOUS; SUBCUTANEOUS at 14:59

## 2017-05-31 RX ADMIN — CLONIDINE HYDROCHLORIDE 0.1 MG: 0.1 TABLET ORAL at 07:23

## 2017-05-31 RX ADMIN — VERAPAMIL HYDROCHLORIDE 2.5 MG: 2.5 INJECTION, SOLUTION INTRAVENOUS at 14:51

## 2017-05-31 RX ADMIN — LISINOPRIL 40 MG: 40 TABLET ORAL at 07:23

## 2017-05-31 RX ADMIN — CLONIDINE HYDROCHLORIDE 0.1 MG: 0.1 TABLET ORAL at 17:00

## 2017-05-31 RX ADMIN — CLONIDINE HYDROCHLORIDE 0.1 MG: 0.1 TABLET ORAL at 13:14

## 2017-05-31 RX ADMIN — METOPROLOL SUCCINATE 150 MG: 100 TABLET, EXTENDED RELEASE ORAL at 07:22

## 2017-05-31 RX ADMIN — ASPIRIN 81 MG 81 MG: 81 TABLET ORAL at 07:23

## 2017-05-31 RX ADMIN — ISOSORBIDE DINITRATE 10 MG: 10 TABLET ORAL at 07:22

## 2017-05-31 RX ADMIN — IOPAMIDOL 60 ML: 755 INJECTION, SOLUTION INTRAVASCULAR at 15:15

## 2017-05-31 RX ADMIN — TOLTERODINE TARTRATE 1 MG: 1 TABLET, FILM COATED ORAL at 09:41

## 2017-05-31 RX ADMIN — AMLODIPINE BESYLATE 5 MG: 5 TABLET ORAL at 09:41

## 2017-05-31 RX ADMIN — CEFTRIAXONE SODIUM 1 G: 1 INJECTION, POWDER, FOR SOLUTION INTRAMUSCULAR; INTRAVENOUS at 16:53

## 2017-05-31 RX ADMIN — ISOSORBIDE DINITRATE 10 MG: 10 TABLET ORAL at 21:53

## 2017-05-31 RX ADMIN — FENTANYL CITRATE 75 MCG: 50 INJECTION INTRAMUSCULAR; INTRAVENOUS at 15:15

## 2017-05-31 RX ADMIN — ASPIRIN 244 MG: 81 TABLET, COATED ORAL at 10:21

## 2017-05-31 RX ADMIN — POTASSIUM CHLORIDE 40 MEQ: 1500 TABLET, EXTENDED RELEASE ORAL at 11:20

## 2017-05-31 RX ADMIN — ATORVASTATIN CALCIUM 10 MG: 10 TABLET, FILM COATED ORAL at 07:22

## 2017-05-31 RX ADMIN — ACETAMINOPHEN 650 MG: 325 TABLET, FILM COATED ORAL at 07:33

## 2017-05-31 RX ADMIN — ISOSORBIDE DINITRATE 10 MG: 10 TABLET ORAL at 16:52

## 2017-05-31 RX ADMIN — SODIUM CHLORIDE: 9 INJECTION, SOLUTION INTRAVENOUS at 11:58

## 2017-05-31 RX ADMIN — SODIUM CHLORIDE: 9 INJECTION, SOLUTION INTRAVENOUS at 16:53

## 2017-05-31 RX ADMIN — AMLODIPINE BESYLATE 5 MG: 5 TABLET ORAL at 07:23

## 2017-05-31 RX ADMIN — LORAZEPAM 0.5 MG: 0.5 TABLET ORAL at 13:49

## 2017-05-31 RX ADMIN — NITROGLYCERIN 500 MCG: 10 INJECTION INTRAVENOUS at 15:16

## 2017-05-31 RX ADMIN — POTASSIUM CHLORIDE 20 MEQ: 1500 TABLET, EXTENDED RELEASE ORAL at 13:10

## 2017-05-31 RX ADMIN — CLONIDINE HYDROCHLORIDE 0.1 MG: 0.1 TABLET ORAL at 21:53

## 2017-05-31 RX ADMIN — TOLTERODINE TARTRATE 1 MG: 1 TABLET, FILM COATED ORAL at 21:53

## 2017-05-31 ASSESSMENT — VISUAL ACUITY
OU: NORMAL ACUITY
OU: NORMAL ACUITY

## 2017-05-31 NOTE — PROGRESS NOTES
[facility]  Hospitalist Progress Note  Name: Daya Ignacio    MRN: [id number]  Provider:  Shaan Harden MD  05/31/17    Initial presenting complaint/issue to hospital (Diagnosis): NSTEMI, UTI.         Assessment and Plan:      Summary of Stay: Daya Ignacio is a 86 year old female admitted on 5/28/2017 with NSTEMI and UTI. Pt with hx of HTN who presented with chills, weakness and fall. Found to have elevated troponin.        Problem List:       1. NSTEMI/demand ischemia or less likely myocarditis.  - Troponin peaked at 3.870.  - On ASA, isordil, metoprolol and lisinopril.  - TTE showed nml EF with inferolateral hypokinesis and moderate pulm HTN .  - Cardiology eval appreciated.  - Cath 5/31.      2. UTI.  - Cx showed E.coli.  - Procalcitonin markedly elevated.  - On IV rocephin.      3. HTN.  - Resume metoprolol, lisinopril and clonidine.  - BP still elevated, will add norvasc.      4. Thrombocytopenia.  - Chronic.  - Peripheral smear non-diagnostic.      DVT Prophylaxis:  -  UFH.  Code Status: Full Code  Discharge Dispo: home.  Estimated Disch Date / # of Days until Discharge: 1 day.                Interval History:        No chest pain/SOB/N/V/fever/chills.                  Physical Exam:      Last Vital Signs:  Temp: 97  F (36.1  C) Temp src: Oral BP: 177/71   Heart Rate: 70 Resp: 18 SpO2: 95 % O2 Device: None (Room air)      Intake/Output Summary (Last 24 hours) at 05/31/17 0841  Last data filed at 05/31/17 0600   Gross per 24 hour   Intake              480 ml   Output              650 ml   Net             -170 ml     I/O last 3 completed shifts:  In: 480 [P.O.:480]  Out: 650 [Urine:650]  Vitals:    05/28/17 1345 05/28/17 2126 05/29/17 0431 05/30/17 0300   Weight: 57.6 kg (127 lb) 59 kg (130 lb) 58.8 kg (129 lb 9.6 oz) 57.3 kg (126 lb 4.8 oz)     GENERALLY:  She is alert, awake, oriented, coherent, nontoxic, in no acute distress.  HEENT:  She has got missing teeth on her lower jaw.  Pharynx,  there is no exudate.  There is no tender anterior cervical lymphadenopathy.   NECK:  Supple.  There is no palpable lymphadenopathy.   LUNGS:  Clear to auscultation bilaterally.   HEART:  Regular rate, S1, S2 normal.  No murmurs or gallops.   ABDOMEN:  Soft, nontender with good bowel sounds.   EXTREMITIES:  There is no edema.   SKIN:  There is no rash.   NEUROLOGIC:  Cranial nerves II-XII are grossly within normal limits, though she has decreased visual acuity.  Motor strength is 5/5 in all extremities.  There is no pain with range of motion of the neck.            Medications:      All current medications were reviewed.         Data:      All new lab and imaging data was reviewed.   Labs:    Recent Labs  Lab 05/28/17  1800 05/28/17  1402   CULT No growth after 3 days >100,000 colonies/mL Escherichia coli*       Recent Labs  Lab 05/31/17  0636 05/30/17  0643 05/29/17  0727 05/28/17  1115   WBC  --  6.1 9.4 11.0   HGB  --  12.3 12.9 13.8   HCT  --  36.4 39.3 41.6   MCV  --  90 91 91   * 105* 105* 114*       Recent Labs  Lab 05/31/17  0636 05/29/17  0727 05/28/17  2230 05/28/17  1115   NA  --  134  --  136   POTASSIUM  --  3.7 3.6 3.1*   CHLORIDE  --  100  --  100   CO2  --  28  --  27   ANIONGAP  --  6  --  9   GLC  --  113*  --  114*   BUN  --  11  --  17   CR 0.47* 0.50*  --  0.60   GFRESTIMATED >90Non  GFR Calc >90Non  GFR Calc  --  >90Non  GFR Calc   GFRESTBLACK >90African American GFR Calc >90African American GFR Calc  --  >90African American GFR Calc   DAVE  --  8.9  --  9.0   MAG  --  1.9  --  1.9   PROTTOTAL  --   --   --  7.3   ALBUMIN  --   --   --  3.6   BILITOTAL  --   --   --  1.6*   ALKPHOS  --   --   --  69   AST  --   --   --  33   ALT  --   --   --  18       Recent Labs  Lab 05/29/17  0009 05/28/17  1800 05/28/17  1425   TROPI 2.095* 2.989* 3.870*      Recent Imaging:   No results found for this or any previous visit (from the past 24 hour(s)).

## 2017-05-31 NOTE — PLAN OF CARE
Angio planned for this afternoon.  K+ low at 3.0 and replaced with charleen to be done early at 1430.  No c/o pain.  Some anxiety with relief after ativan.  Deltrol started today for urgency issue.  Tele SR

## 2017-05-31 NOTE — PLAN OF CARE
Problem: Goal Outcome Summary  Goal: Goal Outcome Summary  Outcome: No Change  VSS, afebrile. BP elevated at times.  95% on RA.  AAOx4. Denies pain overnight. Up independently.  Tele reads SR.  NPO.  Denies nausea.  PIV SL.  LS-clear.  Voiding well. No loose stools overnight.  Skin intact. Plan for possible angio today.

## 2017-05-31 NOTE — PLAN OF CARE
Problem: Cardiac Output Decreased (Adult)  Goal: Identify Related Risk Factors and Signs and Symptoms  Related risk factors and signs and symptoms are identified upon initiation of Human Response Clinical Practice Guideline (CPG)   Outcome: Improving  SBP much improved this evening in the 160's.  Other VSS, afebrile and sats upper 90's on room air.  Headache, relief with Tylenol.  Up I in room.  Angiogram procedure reviewed with pt & son, many questions answered and they verbalized understanding.

## 2017-05-31 NOTE — PROGRESS NOTES
"Cardiology Progress Note  May 31, 2017    Daya Ignacio  [id number]    Subjective:  Feeling well. No CP, no SOB.         Tele: SB/SR      Intake/Output Summary (Last 24 hours) at 05/30/17 1109  Last data filed at 05/30/17 1010   Gross per 24 hour   Intake              607 ml   Output              300 ml   Net              307 ml       Wt Readings from Last 4 Encounters:   05/30/17 57.3 kg (126 lb 4.8 oz)   01/13/17 61.6 kg (135 lb 12.8 oz)   07/08/16 63.4 kg (139 lb 12.8 oz)   01/22/16 64.9 kg (143 lb)       Past Medical History:   Diagnosis Date     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 4/16/2014     Closed fracture of unspecified part of neck of femur      Closed fracture of unspecified part of vertebral column without mention of spinal cord injury     from fall summer 06     Elevated blood pressure reading without diagnosis of hypertension      Hypertension      Macular degeneration (senile) of retina, unspecified        Current Facility-Administered Medications   Medication Dose Route Frequency     amLODIPine  10 mg Oral Daily     tolterodine  1 mg Oral BID     cloNIDine  0.1 mg Oral 4x Daily     metoprolol  150 mg Oral Daily     enoxaparin  40 mg Subcutaneous Q24H     lisinopril  40 mg Oral Daily     aspirin  81 mg Oral Daily     atorvastatin  10 mg Oral Daily     isosorbide dinitrate  10 mg Oral TID     cefTRIAXone  1 g Intravenous Q24H         PE:  NAD  /71 (BP Location: Left arm)  Pulse 91  Temp 97  F (36.1  C) (Oral)  Resp 18  Ht 1.626 m (5' 4\")  Wt 57.3 kg (126 lb 4.8 oz)  SpO2 95%  BMI 21.68 kg/m2  No JVD  Regular rate and rhythm without murmurs, rubs, or gallops  Clear without crackles or wheezing  Soft, NT  Alert and oriented  No LE edema  Affect normal    Last Basic Metabolic Panel:  Lab Results   Component Value Date     05/29/2017      Lab Results   Component Value Date    POTASSIUM 3.7 05/29/2017     Lab Results   Component Value Date    CHLORIDE 100 05/29/2017     Lab " Results   Component Value Date    DAVE 8.9 05/29/2017     Lab Results   Component Value Date    CO2 28 05/29/2017     Lab Results   Component Value Date    BUN 11 05/29/2017     Lab Results   Component Value Date    CR 0.50 05/29/2017     Lab Results   Component Value Date     05/29/2017       Lab Results   Component Value Date    TROPI 2.095 (HH) 05/29/2017    TROPI 2.989 (HH) 05/28/2017    TROPI 3.870 (HH) 05/28/2017    TROPI 3.791 (HH) 05/28/2017    TROPI 0.023 06/24/2011       Lab Results   Component Value Date    WBC 6.1 05/30/2017     Lab Results   Component Value Date    RBC 4.05 05/30/2017     Lab Results   Component Value Date    HGB 12.3 05/30/2017     Lab Results   Component Value Date    HCT 36.4 05/30/2017     No components found for: MCT  Lab Results   Component Value Date    MCV 90 05/30/2017     Lab Results   Component Value Date    MCH 30.4 05/30/2017     Lab Results   Component Value Date    MCHC 33.8 05/30/2017     Lab Results   Component Value Date    RDW 13.7 05/30/2017     Lab Results   Component Value Date     05/30/2017     Her peripheral smear showed mild thrombocytopenia.          Assessment/Plan:  86 year old admitted with a UTI which has resulted in a demand MI.  TTE shows inferolateral hypokinesis and at this time as her peripheral smear does not show any significant abnormalities (mild thrombocytopenia), will plan on cor angio, poss PCI today.        NSTEMI, ?demand ischemia in setting of significant HTN and UTI  - Trop 3.8 then downward trending  - EKG with SR and inferolateral ST depression  - TTE with LVEF 55-60%, mild to moderate inferolateral HK  - No CP  - Cor angio, poss PCI today   * Risks and benefits of coronary angiogram discussed today including, bleeding, bruising, infection, allergic reaction, kidney damage (including need for dialysis), stroke, heart attack, vascular damage, emergency open heart surgery, up to and including death.  Patient indicates  understanding and is agreeable to proceed.     * No bleeding concerns. No contrast dye allergy. No fever. No upcoming surgeries. No significant renal issues.   - ASA, BB, ACEi, statin  - Risk factor reduction    HTN  - Home meds Toprol XL 100mg, lisinopril 40mg, clonidine 0.1m,g BID. Added Norvasc 5mg. Increased clonidine to 0.1mg QID.   - Remains hypertensive. Increasing Norvasc to 10mg and increasing Toprol XL to 150mg today. Consdier Coreg instead of Toprol XL.   - Follow     Thrombocytopenia  - Chronic  - Peripheral smear showed mild thrombocytopenia    UTI  - Cx showed E coli  - On IV abx      Art Evans PA-C

## 2017-06-01 VITALS
TEMPERATURE: 97.9 F | DIASTOLIC BLOOD PRESSURE: 71 MMHG | HEIGHT: 64 IN | BODY MASS INDEX: 21.56 KG/M2 | WEIGHT: 126.3 LBS | RESPIRATION RATE: 20 BRPM | HEART RATE: 91 BPM | OXYGEN SATURATION: 96 % | SYSTOLIC BLOOD PRESSURE: 148 MMHG

## 2017-06-01 LAB
MAGNESIUM SERPL-MCNC: 1.7 MG/DL (ref 1.6–2.3)
POTASSIUM SERPL-SCNC: 3.5 MMOL/L (ref 3.4–5.3)

## 2017-06-01 PROCEDURE — 25000132 ZZH RX MED GY IP 250 OP 250 PS 637: Mod: GY | Performed by: INTERNAL MEDICINE

## 2017-06-01 PROCEDURE — A9270 NON-COVERED ITEM OR SERVICE: HCPCS | Mod: GY | Performed by: INTERNAL MEDICINE

## 2017-06-01 PROCEDURE — 83735 ASSAY OF MAGNESIUM: CPT | Performed by: INTERNAL MEDICINE

## 2017-06-01 PROCEDURE — 99232 SBSQ HOSP IP/OBS MODERATE 35: CPT | Performed by: INTERNAL MEDICINE

## 2017-06-01 PROCEDURE — 99239 HOSP IP/OBS DSCHRG MGMT >30: CPT | Performed by: INTERNAL MEDICINE

## 2017-06-01 PROCEDURE — 36415 COLL VENOUS BLD VENIPUNCTURE: CPT | Performed by: INTERNAL MEDICINE

## 2017-06-01 PROCEDURE — 84132 ASSAY OF SERUM POTASSIUM: CPT | Performed by: INTERNAL MEDICINE

## 2017-06-01 RX ORDER — ISOSORBIDE DINITRATE 10 MG/1
10 TABLET ORAL 3 TIMES DAILY
Qty: 90 TABLET | Refills: 3 | Status: SHIPPED | OUTPATIENT
Start: 2017-06-01 | End: 2017-11-06

## 2017-06-01 RX ORDER — AMLODIPINE BESYLATE 10 MG/1
10 TABLET ORAL DAILY
Qty: 30 TABLET | Refills: 3 | Status: SHIPPED | OUTPATIENT
Start: 2017-06-01 | End: 2019-02-19

## 2017-06-01 RX ORDER — ATORVASTATIN CALCIUM 10 MG/1
10 TABLET, FILM COATED ORAL DAILY
Qty: 30 TABLET | Refills: 3 | Status: ON HOLD | OUTPATIENT
Start: 2017-06-01 | End: 2017-06-19

## 2017-06-01 RX ORDER — CEFADROXIL 500 MG/1
500 CAPSULE ORAL 2 TIMES DAILY
Qty: 12 CAPSULE | Refills: 0 | Status: SHIPPED | OUTPATIENT
Start: 2017-06-01 | End: 2017-06-07

## 2017-06-01 RX ADMIN — LISINOPRIL 40 MG: 40 TABLET ORAL at 08:22

## 2017-06-01 RX ADMIN — ASPIRIN 81 MG: 81 TABLET, COATED ORAL at 08:22

## 2017-06-01 RX ADMIN — ATORVASTATIN CALCIUM 10 MG: 10 TABLET, FILM COATED ORAL at 08:21

## 2017-06-01 RX ADMIN — ISOSORBIDE DINITRATE 10 MG: 10 TABLET ORAL at 08:22

## 2017-06-01 RX ADMIN — TOLTERODINE TARTRATE 1 MG: 1 TABLET, FILM COATED ORAL at 08:22

## 2017-06-01 RX ADMIN — CLONIDINE HYDROCHLORIDE 0.1 MG: 0.1 TABLET ORAL at 08:22

## 2017-06-01 RX ADMIN — AMLODIPINE BESYLATE 10 MG: 10 TABLET ORAL at 08:22

## 2017-06-01 RX ADMIN — METOPROLOL SUCCINATE 150 MG: 100 TABLET, EXTENDED RELEASE ORAL at 08:21

## 2017-06-01 NOTE — PLAN OF CARE
"Problem: Cardiac Output Decreased (Adult)  Goal: Identify Related Risk Factors and Signs and Symptoms  Related risk factors and signs and symptoms are identified upon initiation of Human Response Clinical Practice Guideline (CPG)   Outcome: Improving  RN SHIFT SUMMARY :  DX/STORY : admit of 5/28 with chills, weakness & fall at home, E.Coli UTI . Found  Trops elevated , S/P Coronary Angio   Today 5/31=minimal disease.   HX : HTN , poor vision d/t macular deg.   LABS/PROTOCOLS : UC=+E.coli -.trops elevated .  S/P C. Angio= minimal   TELE : SR- 60's   ASSESS : a/o x4, very pleasant, poor vision , up with SBA . Has Left Radial angio site- oozing after initial 3 cc saline withdrawn -  Since then have removed slowly & will place armboard on at HS to prevent bending . Site looks ok now. No pain,   TEACHING : reviewed POC & current meds   PLAN : at baseline is   From \" [facility]  [place] \" . Has  supportive family . .cardiology consulted .   Hopes for D/c Thurs. Back to [facility] Cont. Rocephin for UTI .             "

## 2017-06-01 NOTE — PLAN OF CARE
Problem: Goal Outcome Summary  Goal: Goal Outcome Summary  Outcome: Adequate for Discharge Date Met:  06/01/17  Reviewed new medications with patient. Will  at her own pharmacy. Left radial site C/D/I. Patient given information and education on heart healthy diet and post angiography care. PURVI Heredia RN

## 2017-06-01 NOTE — PROGRESS NOTES
"Cardiology Progress Note  May 31, 2017    Daya Ignacio  [id number]    Subjective:  Feeling well. No CP, no SOB.     Tele: SR      Intake/Output Summary (Last 24 hours) at 05/30/17 1109  Last data filed at 05/30/17 1010   Gross per 24 hour   Intake              607 ml   Output              300 ml   Net              307 ml       Wt Readings from Last 4 Encounters:   05/30/17 57.3 kg (126 lb 4.8 oz)   01/13/17 61.6 kg (135 lb 12.8 oz)   07/08/16 63.4 kg (139 lb 12.8 oz)   01/22/16 64.9 kg (143 lb)       Past Medical History:   Diagnosis Date     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 4/16/2014     Closed fracture of unspecified part of neck of femur      Closed fracture of unspecified part of vertebral column without mention of spinal cord injury     from fall summer 06     Elevated blood pressure reading without diagnosis of hypertension      Hypertension      Macular degeneration (senile) of retina, unspecified        Current Facility-Administered Medications   Medication Dose Route Frequency     amLODIPine  10 mg Oral Daily     tolterodine  1 mg Oral BID     iopamidol  100 mL INTRA-ARTERIAL Once     sodium chloride (PF)  3 mL Intracatheter Q8H     aspirin EC  81 mg Oral Daily     cloNIDine  0.1 mg Oral 4x Daily     metoprolol  150 mg Oral Daily     enoxaparin  40 mg Subcutaneous Q24H     lisinopril  40 mg Oral Daily     atorvastatin  10 mg Oral Daily     isosorbide dinitrate  10 mg Oral TID     cefTRIAXone  1 g Intravenous Q24H         PE:  NAD  /71 (BP Location: Left arm)  Pulse 91  Temp 97.9  F (36.6  C) (Oral)  Resp 20  Ht 1.626 m (5' 4\")  Wt 57.3 kg (126 lb 4.8 oz)  SpO2 96%  BMI 21.68 kg/m2  No JVD  Regular rate and rhythm without murmurs, rubs, or gallops  Clear without crackles or wheezing  Soft, NT  Alert and oriented  No LE edema. LRA site: reverse Derrell test is normal   Affect normal      Recent Labs  Lab 06/01/17  0642 05/31/17  1625 05/31/17  1405 05/31/17  0636 05/30/17  0643 " 05/29/17  0727 05/29/17  0009  05/28/17  1800 05/28/17  1425 05/28/17  1115   WBC  --   --   --   --  6.1 9.4  --   --   --   --  11.0   HGB  --   --   --   --  12.3 12.9  --   --   --   --  13.8   MCV  --   --   --   --  90 91  --   --   --   --  91   PLT  --   --   --  115* 105* 105*  --   --   --   --  114*   NA  --   --   --  138  --  134  --   --   --   --  136   POTASSIUM 3.5 3.6 3.2* 3.0*  --  3.7  --   < >  --   --  3.1*   CHLORIDE  --   --   --  101  --  100  --   --   --   --  100   CO2  --   --   --  29  --  28  --   --   --   --  27   BUN  --   --   --  7  --  11  --   --   --   --  17   CR  --   --   --  0.47*  --  0.50*  --   --   --   --  0.60   ANIONGAP  --   --   --   --   --  6  --   --   --   --  9   DAVE  --   --   --  9.1  --  8.9  --   --   --   --  9.0   GLC  --   --   --  100*  --  113*  --   --   --   --  114*   ALBUMIN  --   --   --   --   --   --   --   --   --   --  3.6   PROTTOTAL  --   --   --   --   --   --   --   --   --   --  7.3   BILITOTAL  --   --   --   --   --   --   --   --   --   --  1.6*   ALKPHOS  --   --   --   --   --   --   --   --   --   --  69   ALT  --   --   --   --   --   --   --   --   --   --  18   AST  --   --   --   --   --   --   --   --   --   --  33   TROPI  --   --   --   --   --   --  2.095*  --  2.989* 3.870* 3.791*   < > = values in this interval not displayed.    Lab Results   Component Value Date    TROPI 2.095 () 05/29/2017    TROPI 2.989 () 05/28/2017    TROPI 3.870 () 05/28/2017    TROPI 3.791 () 05/28/2017    TROPI 0.023 06/24/2011       Her peripheral smear showed mild thrombocytopenia.          Assessment/Plan:  86 year old admitted with a UTI which has resulted in a demand MI.  TTE shows inferolateral hypokinesis and at this time as her peripheral smear does not show any significant abnormalities (mild thrombocytopenia). Cor angio 5/31/17: 50% smooth prox LAD, otherwise minimal CAD. LVEDP 21mmHg.        NSTEMI, type 2 demand ischemia in  setting of significant HTN and UTI  - Trop 3.8 then downward trending  - EKG with SR and inferolateral ST depression  - TTE with LVEF 55-60%, mild to moderate inferolateral HK  - No CP  - Cor angio 5/31/17: 50% smooth prox LAD, otherwise minimal CAD. LVEDP 21mmHg.    * Reviewed wrist restrictions   - ASA, BB, ACEi, statin (new)  - Risk factor reduction    HTN  - Home meds Toprol XL 100mg, lisinopril 40mg, clonidine 0.1mg BID.   - Added Norvasc 10mg and isordil 10mg TID. Increased clonidine to 0.1mg QID, increased Toprol XL to 150mg today. Continued lisinopril 40mg. BPs much better controlled.   - Consider home dose of clonidine and adding a low dose diuretic given LVEDP  - Follow     Thrombocytopenia  - Chronic  - Peripheral smear showed mild thrombocytopenia    UTI  - Cx showed E coli  - On IV abx    DISPO: Cards will sign off.   Follow up 2 weeks (follow up cor angio and HTN/CAD). Will arrange.       Art Eavns PA-C

## 2017-06-01 NOTE — DISCHARGE SUMMARY
"Discharge Summary    Daya Ignacio MRN# [id number]   YOB: 1930 Age: 86 year old     Date of Admission:  5/28/2017  Date of Discharge:  6/1/2017  Admitting Physician:  Shaan Harden MD  Discharge Physician:  Andre Mcmanus MD  Discharging Service:  Hospitalist       Primary Provider: Marcy Soares          Discharge Diagnosis:   1.  Non ST elevation myocardial infarction.  2.  S/p heart catheterization showing only 50% smooth proximal LAD lesion, no intervention needed. Aspirin, Lipitor, and Isordil added.   3.  Urinary tract infection, treated with Rocephin while here.  Discharge on Duricef to complete course of antibiotics.   4.  Hypertension.  Amlodipine added.   5.  Chronic, stable, modest thrombocytopenia.  Peripheral smear obtained and not diagnostic.              Discharge Disposition:   Discharged to home           Allergies:   Allergies   Allergen Reactions     Macrobid [Nitrofurantoin Anhydrous] Other (See Comments)     Body aches     Penicillins      Sulfa Drugs                 Condition on Discharge:   Discharge condition: Stable   Discharge vitals: Blood pressure 148/71, pulse 91, temperature 97.9  F (36.6  C), temperature source Oral, resp. rate 20, height 1.626 m (5' 4\"), weight 57.3 kg (126 lb 4.8 oz), SpO2 96 %, not currently breastfeeding.   Code status on discharge: Full Code   Physical exam on day of discharge:   GENERAL:  Comfortable. Cooperative.  PSYCH: pleasant, oriented, No acute distress.  EYES: PERRLA, Normal conjunctiva.  HEART:  Regular rate and rhythm. No JVD. Pulses normal. No edema.  LUNGS:  Clear to auscultation, normal Respiratory effort.  ABDOMEN:  Soft, no hepatosplenomegaly, normal bowel sounds.  EXTREMETIES: No clubbing, cyanosis or ischemia  SKIN:  Dry to touch, No rash.         History of Present Illness and Hospital Course:     See detailed admission note for full details.  Daya Ignacio is an 86-year-old female with history of hypertension " and macular degeneration who lives in an independent apartment.  She was in her usual state of health until 5/27 when she developed some chills.  She denies fever.  She had nausea, emesis, and weakness. On 5/28 she had a fall while on the way to the toilet.  She had no head trauma. She had some dysuria.  She came to the ED for evaluation.  ED work up showed low grade fever of 99.5, elevated troponin of 3.87, potassium of 3.1, and abnormal urine analysis.  She was admitted to the hospital with non ST elevation myocardial infarction and UTI.  Troponins were down-trending.  Echo showed mild inferolateral hypokinesis.  She was seen by Cardiology.  Medical management was recommended.  Daily ASA, Lipitor, and Isordil were added.  Prior to admission Metoprolol and Lisinopril were continued. Heart catheterization was performed and showed only a 50% smooth proximal LAD lesion, no intervention needed.  Continued medical management was recommended.  Urine culture grew cephalosporin susceptible e coli that was treated with Rocephin. Daya improved while here.  Amlodipine was added for persistent hypertension.  Daya will discharge back home today.            Procedures / Imaging:   Heart catheterization  Echocardiogram  CXR  CT of head and c spine  ECG           Consultations:   Consultation during this admission received from cardiology             Pending Results:   None           Discharge Instructions and Follow-Up:   Discharge diet: Cardiac   Discharge activity: Activity as tolerated   Discharge follow-up: Follow up with primary care provider in 1 week   Outpatient therapy: None    Other instructions: None             Discharge Medications:   Current Discharge Medication List      START taking these medications    Details   aspirin EC 81 MG EC tablet Take 1 tablet (81 mg) by mouth daily  Qty: 30 tablet, Refills: 3    Associated Diagnoses: Non-STEMI (non-ST elevated myocardial infarction) (H)      isosorbide dinitrate  (ISORDIL) 10 MG tablet Take 1 tablet (10 mg) by mouth 3 times daily  Qty: 90 tablet, Refills: 3    Associated Diagnoses: Non-STEMI (non-ST elevated myocardial infarction) (H)      atorvastatin (LIPITOR) 10 MG tablet Take 1 tablet (10 mg) by mouth daily  Qty: 30 tablet, Refills: 3    Associated Diagnoses: Non-STEMI (non-ST elevated myocardial infarction) (H)      amLODIPine (NORVASC) 10 MG tablet Take 1 tablet (10 mg) by mouth daily  Qty: 30 tablet, Refills: 3    Associated Diagnoses: Hypertension goal BP (blood pressure) < 140/90      cefadroxil (DURICEF) 500 MG capsule Take 1 capsule (500 mg) by mouth 2 times daily for 6 days  Qty: 12 capsule, Refills: 0    Associated Diagnoses: Acute cystitis without hematuria         CONTINUE these medications which have NOT CHANGED    Details   gabapentin (NEURONTIN) 300 MG capsule TAKE ONE CAPSULE BY MOUTH THREE TIMES A DAY  Qty: 180 capsule, Refills: 1    Associated Diagnoses: Neuropathy (H)      acetaminophen-codeine (TYLENOL/CODEINE #3) 300-30 MG per tablet Take 1 tablet by mouth every 8 hours as needed for mild pain  Qty: 270 tablet, Refills: 0    Associated Diagnoses: Bursitis      sertraline (ZOLOFT) 50 MG tablet Take 1 tablet (50 mg) by mouth daily  Qty: 90 tablet, Refills: 1    Associated Diagnoses: Generalized anxiety disorder      lisinopril (PRINIVIL/ZESTRIL) 40 MG tablet Take 1 tablet (40 mg) by mouth daily  Qty: 90 tablet, Refills: 1    Associated Diagnoses: Essential hypertension      cloNIDine (CATAPRES) 0.1 MG tablet Take 1 tablet (0.1 mg) by mouth 2 times daily  Qty: 180 tablet, Refills: 2    Associated Diagnoses: Essential hypertension      metoprolol (TOPROL-XL) 100 MG 24 hr tablet Take 1 tablet (100 mg) by mouth daily  Qty: 90 tablet, Refills: 1    Associated Diagnoses: HTN (hypertension), benign      conjugated estrogens (PREMARIN) vaginal cream Place 0.5 g vaginally three times a week  Qty: 30 g, Refills: 6    Associated Diagnoses: Other urinary problems       ascorbic acid (VITAMIN C) 500 MG tablet take 1 Tab by mouth daily.  Qty: 100 Tab, Refills: 12      Cholecalciferol (VITAMIN D) 1000 UNIT capsule take 1 Cap by mouth daily.  Qty: 100 Cap, Refills: 12      fish oil-omega-3 fatty acids 1000 MG capsule take 2 Caps by mouth daily.  Qty: 180 Cap, Refills: 12                Total time spent in face to face contact with the patient and coordinating discharge was:  40 Minutes

## 2017-06-01 NOTE — PHARMACY - DISCHARGE MEDICATION RECONCILIATION
Discharge medication review for this patient is complete. Unable to provide face-to-face medication education prior to discharge.  See [facility] for allergy information and immunization status.   Pharmacist assisted with medication reconciliation of discharge medications with PTA medications.  Discussed discharge medications with physician.    Patient to STOP taking the following HOME medications:   none    Patient to START taking the following NEW medications:   Amlodipine, ASA, Atorvastatin, Cefadroxil and Isosorbide Dinitrate.    Patient was informed to make the following changes to prior to admission medications:  None.    Discharge Medication List     Review of your medicines      START taking       Dose / Directions    amLODIPine 10 MG tablet   Commonly known as:  NORVASC   Used for:  Hypertension goal BP (blood pressure) < 140/90   Notes to Patient:  New medication for high blood pressure        Dose:  10 mg   Take 1 tablet (10 mg) by mouth daily   Quantity:  30 tablet   Refills:  3       aspirin 81 MG EC tablet   Used for:  Non-STEMI (non-ST elevated myocardial infarction) (H)        Dose:  81 mg   Take 1 tablet (81 mg) by mouth daily   Quantity:  30 tablet   Refills:  3       atorvastatin 10 MG tablet   Commonly known as:  LIPITOR   Used for:  Non-STEMI (non-ST elevated myocardial infarction) (H)        Dose:  10 mg   Take 1 tablet (10 mg) by mouth daily   Quantity:  30 tablet   Refills:  3       cefadroxil 500 MG capsule   Commonly known as:  DURICEF   Used for:  Acute cystitis without hematuria   Notes to Patient:  Antibiotic for urinary tract infection        Dose:  500 mg   Take 1 capsule (500 mg) by mouth 2 times daily for 6 days   Quantity:  12 capsule   Refills:  0       isosorbide dinitrate 10 MG tablet   Commonly known as:  ISORDIL   Used for:  Non-STEMI (non-ST elevated myocardial infarction) (H)        Dose:  10 mg   Take 1 tablet (10 mg) by mouth 3 times daily   Quantity:  90 tablet   Refills:  3          CONTINUE these medicines which have NOT CHANGED       Dose / Directions    acetaminophen-codeine 300-30 MG per tablet   Commonly known as:  TYLENOL/codeine #3   Used for:  Bursitis        Dose:  1 tablet   Take 1 tablet by mouth every 8 hours as needed for mild pain   Quantity:  270 tablet   Refills:  0       ascorbic acid 500 MG tablet   Commonly known as:  VITAMIN C   Notes to Patient:  Take as you were prior to admission        Dose:  500 mg   take 1 Tab by mouth daily.   Quantity:  100 Tab   Refills:  12       cloNIDine 0.1 MG tablet   Commonly known as:  CATAPRES   Used for:  Essential hypertension        Dose:  0.1 mg   Take 1 tablet (0.1 mg) by mouth 2 times daily   Quantity:  180 tablet   Refills:  2       conjugated estrogens cream   Commonly known as:  PREMARIN   Used for:  Other urinary problems        Dose:  0.5 g   Place 0.5 g vaginally three times a week   Quantity:  30 g   Refills:  6       fish oil-omega-3 fatty acids 1000 MG capsule        Dose:  2 g   take 2 Caps by mouth daily.   Quantity:  180 Cap   Refills:  12       gabapentin 300 MG capsule   Commonly known as:  NEURONTIN   Used for:  Neuropathy (H)   Notes to Patient:  Take as you were prior to admission        TAKE ONE CAPSULE BY MOUTH THREE TIMES A DAY   Quantity:  180 capsule   Refills:  1       lisinopril 40 MG tablet   Commonly known as:  PRINIVIL/ZESTRIL   Used for:  Essential hypertension        Dose:  40 mg   Take 1 tablet (40 mg) by mouth daily   Quantity:  90 tablet   Refills:  1       metoprolol 100 MG 24 hr tablet   Commonly known as:  TOPROL-XL   Used for:  HTN (hypertension), benign        Dose:  100 mg   Take 1 tablet (100 mg) by mouth daily   Quantity:  90 tablet   Refills:  1       sertraline 50 MG tablet   Commonly known as:  ZOLOFT   Used for:  Generalized anxiety disorder   Notes to Patient:  Take as you were prior to admission        Dose:  50 mg   Take 1 tablet (50 mg) by mouth daily   Quantity:  90 tablet    Refills:  1       vitamin D 1000 UNITS capsule        Dose:  1 capsule   take 1 Cap by mouth daily.   Quantity:  100 Cap   Refills:  12            Where to get your medicines      These medications were sent to [facility] [address]  [address] [facility] [address]     Phone:  [phone]      amLODIPine 10 MG tablet     aspirin 81 MG EC tablet     atorvastatin 10 MG tablet     cefadroxil 500 MG capsule     isosorbide dinitrate 10 MG tablet

## 2017-06-01 NOTE — PLAN OF CARE
Pt is a/o x4. VSS, afebrile. Tele: SB. Denies pain. Up with standby assist. Left radial angio site, no bleeding, denies pain. Bandage c/d/i. All questions and concerns addressed.

## 2017-06-02 ENCOUNTER — TELEPHONE (OUTPATIENT)
Dept: FAMILY MEDICINE | Facility: CLINIC | Age: 82
End: 2017-06-02

## 2017-06-02 NOTE — TELEPHONE ENCOUNTER
"Hospital/TCU/ED for chronic condition Discharge Protocol    \"Hi, my name is Juliano ROMEROClaudia Pena, a registered nurse, and I am calling from [facility].  I am calling to follow up and see how things are going for you after your recent emergency visit/hospital/TCU stay.\"    Tell me how you are doing now that you are home?\" I am so glad to be home and doing well.    Pt reports she lives in senior [facility], daughter-in-law will be picking up prescriptions today from pharmacy today and has friend in the building that will bring her to appointments.      Discharge Instructions    \"Let's review your discharge instructions.       \"Has an appointment with your primary care provider been scheduled?\"   No (schedule appointment)   PCP out of office next week. Pt prefers female. PCP rec Dr. Garcia. Pt agrees. Scheduled,     \"When you see the provider, I would recommend that you bring your medications with you.\"    Medications    \"Tell me what changed about your medicines when you discharged?\"    Changes to chronic meds?    2 or more    \"What questions do you have about your medications?\"    None     New diagnoses of heart failure, COPD, diabetes, or MI?    Yes - Care Coordination Referral needed              Medication reconciliation completed? Yes  Was MTM referral placed (*Make sure to put transitions as reason for referral)?   No   Added appointment note.     Call Summary    \"What questions or concerns do you have about your recent visit and your follow-up care?\"     none    \"If you have questions or things don't continue to improve, we encourage you contact us through the main clinic number. Even if the clinic is not open, triage nurses are available 24/7 to help you.     \"Thank you for your time and take care!\"             "

## 2017-06-03 LAB
BACTERIA SPEC CULT: NO GROWTH
Lab: NORMAL
MICRO REPORT STATUS: NORMAL
SPECIMEN SOURCE: NORMAL

## 2017-06-05 ENCOUNTER — CARE COORDINATION (OUTPATIENT)
Dept: CARDIOLOGY | Facility: CLINIC | Age: 82
End: 2017-06-05

## 2017-06-05 ENCOUNTER — OFFICE VISIT (OUTPATIENT)
Dept: FAMILY MEDICINE | Facility: CLINIC | Age: 82
End: 2017-06-05
Payer: MEDICARE

## 2017-06-05 VITALS
SYSTOLIC BLOOD PRESSURE: 150 MMHG | OXYGEN SATURATION: 97 % | BODY MASS INDEX: 21.68 KG/M2 | DIASTOLIC BLOOD PRESSURE: 70 MMHG | RESPIRATION RATE: 12 BRPM | HEART RATE: 55 BPM | WEIGHT: 127 LBS | TEMPERATURE: 98 F | HEIGHT: 64 IN

## 2017-06-05 DIAGNOSIS — I10 HYPERTENSION GOAL BP (BLOOD PRESSURE) < 140/90: ICD-10-CM

## 2017-06-05 DIAGNOSIS — I21.4 NSTEMI (NON-ST ELEVATED MYOCARDIAL INFARCTION) (H): ICD-10-CM

## 2017-06-05 DIAGNOSIS — N30.00 ACUTE CYSTITIS WITHOUT HEMATURIA: Primary | ICD-10-CM

## 2017-06-05 LAB
ALBUMIN UR-MCNC: NEGATIVE MG/DL
APPEARANCE UR: CLEAR
BILIRUB UR QL STRIP: NEGATIVE
COLOR UR AUTO: YELLOW
GLUCOSE UR STRIP-MCNC: NEGATIVE MG/DL
HGB UR QL STRIP: NEGATIVE
KETONES UR STRIP-MCNC: NEGATIVE MG/DL
LEUKOCYTE ESTERASE UR QL STRIP: NEGATIVE
NITRATE UR QL: NEGATIVE
PH UR STRIP: 7 PH (ref 5–7)
SP GR UR STRIP: 1.01 (ref 1–1.03)
URN SPEC COLLECT METH UR: NORMAL
UROBILINOGEN UR STRIP-ACNC: 0.2 EU/DL (ref 0.2–1)

## 2017-06-05 PROCEDURE — 99214 OFFICE O/P EST MOD 30 MIN: CPT | Performed by: FAMILY MEDICINE

## 2017-06-05 PROCEDURE — 81003 URINALYSIS AUTO W/O SCOPE: CPT | Performed by: FAMILY MEDICINE

## 2017-06-05 NOTE — NURSING NOTE
"Chief Complaint   Patient presents with     Hospital F/U       Initial Ht 5' 4\" (1.626 m)  Wt 127 lb (57.6 kg)  BMI 21.8 kg/m2 Estimated body mass index is 21.8 kg/(m^2) as calculated from the following:    Height as of this encounter: 5' 4\" (1.626 m).    Weight as of this encounter: 127 lb (57.6 kg).  Medication Reconciliation: complete   Evelyne Leos CMA      "

## 2017-06-05 NOTE — PROGRESS NOTES
SUBJECTIVE:                                                    Daya Ignacio is a 86 year old female who presents to clinic today for the following health issues:          Hospital Follow-up Visit:    Hospital/Nursing Home/IP Rehab Facility: [facility]  Date of Admission: 5/28/17  Date of Discharge: 6/1/17  Reason(s) for Admission: weakness, chills-fall            Problems taking medications regularly:  None       Medication changes since discharge: Isordil, Lipitor, Amlodipine, Duricef       Problems adhering to non-medication therapy:  None    Summary of hospitalization:  [facility] discharge summary reviewed  Diagnostic Tests/Treatments reviewed.  Follow up needed: none  Other Healthcare Providers Involved in Patient s Care:         None  Update since discharge: improved.     Post Discharge Medication Reconciliation: discharge medications reconciled, continue medications without change.  Plan of care communicated with patient     Coding guidelines for this visit:  Type of Medical   Decision Making Face-to-Face Visit       within 7 Days of discharge Face-to-Face Visit        within 14 days of discharge   Moderate Complexity 21058 [id number]   High Complexity 78074 48598     Hospital summary:  1.  Non ST elevation myocardial infarction.  2.  S/p heart catheterization showing only 50% smooth proximal LAD lesion, no intervention needed. Aspirin, Lipitor, and Isordil added.   3.  Urinary tract infection, treated with Rocephin while here.  Discharge on Duricef to complete course of antibiotics.   4.  Hypertension.  Amlodipine added.   5.  Chronic, stable, modest thrombocytopenia.  Peripheral smear obtained and not diagnostic.         Patient reports feeling well, however does still note some dysuria.  She has one day of abx left.         Problem list and histories reviewed & adjusted, as indicated.  Additional history: as documented        Reviewed and updated as needed this visit by clinical  "staff       Reviewed and updated as needed this visit by Provider         ROS:  Constitutional, HEENT, cardiovascular, pulmonary, gi and gu systems are negative, except as otherwise noted.    OBJECTIVE:                                                    /70 (BP Location: Right arm, Patient Position: Chair, Cuff Size: Adult Regular)  Pulse 55  Temp 98  F (36.7  C) (Oral)  Resp 12  Ht 5' 4\" (1.626 m)  Wt 127 lb (57.6 kg)  SpO2 97%  BMI 21.8 kg/m2  Body mass index is 21.8 kg/(m^2).  GENERAL: healthy, alert and no distress  RESP: lungs clear to auscultation - no rales, rhonchi or wheezes  CV: regular rate and rhythm, normal S1 S2, no S3 or S4, no murmur, click or rub, no peripheral edema and peripheral pulses strong  ABDOMEN: soft, nontender, no hepatosplenomegaly, no masses and bowel sounds normal    Diagnostic Test Results:  Results for orders placed or performed in visit on 06/05/17 (from the past 24 hour(s))   UA reflex to Microscopic and Culture   Result Value Ref Range    Color Urine Yellow     Appearance Urine Clear     Glucose Urine Negative NEG mg/dL    Bilirubin Urine Negative NEG    Ketones Urine Negative NEG mg/dL    Specific Gravity Urine 1.015 1.003 - 1.035    Blood Urine Negative NEG    pH Urine 7.0 5.0 - 7.0 pH    Protein Albumin Urine Negative NEG mg/dL    Urobilinogen Urine 0.2 0.2 - 1.0 EU/dL    Nitrite Urine Negative NEG    Leukocyte Esterase Urine Negative NEG    Source Midstream Urine         ASSESSMENT/PLAN:                                                      1. NSTEMI (non-ST elevated myocardial infarction) (H)  - Continue ASA, Isordil, Lipitor, Toprol    2. Hypertension goal BP (blood pressure) < 140/90  - Not at goal, but improving compared to hospital readings  - Continue Norvasc, lisinopril, clonidine, and metoprolol  - Will have her f/u in 1 month to recheck BP  - May need addition of another medication if not controlled by then     3. Acute cystitis without hematuria  - " Resolving  - Reassured pt that her UA today shows no residual signs of infection   - UA reflex to Microscopic and Culture    F/U in 1 month for BP check     Shahnaz Garcia, DO  [facility]

## 2017-06-05 NOTE — PROGRESS NOTES
Called patient to discuss any post hospital d/c questions she may have, review medication changes, and confirm f/u appts. Patient denied any questions regarding new medications or changes to some of her current medications that she was taking prior to admission. Patient denied any SOB, chest pain, or light headedness. RN confirmed with patient that she has an apt scheduled on 6/15/17 with SANTIAG[facility] Art Evans. Patient advised to call clinic with any cardiac related questions or concerns prior to her apt on 6/15/17. Patient verbalized understanding and agreed with plan.

## 2017-06-05 NOTE — MR AVS SNAPSHOT
"              After Visit Summary   6/5/2017    Daya Ignacio    MRN: [id number]           Patient Information     Date Of Birth          10/11/1930        Visit Information        Provider Department      6/5/2017 2:00 PM Shahnaz Garcia DO [facility]        Today's Diagnoses     Acute cystitis without hematuria    -  1    NSTEMI (non-ST elevated myocardial infarction) (H)        Hypertension goal BP (blood pressure) < 140/90           Follow-ups after your visit        Your next 10 appointments already scheduled     Tiago 15, 2017  2:30 PM CDT   Return Discharge with Art Evans PA-C   [facility] [place] ([facility])    [address]  [place] [address] [phone]              Who to contact     If you have questions or need follow up information about today's clinic visit or your schedule please contact [facility] directly at [phone].  Normal or non-critical lab and imaging results will be communicated to you by MyChart, letter or phone within 4 business days after the clinic has received the results. If you do not hear from us within 7 days, please contact the clinic through [employer] or phone. If you have a critical or abnormal lab result, we will notify you by phone as soon as possible.  Submit refill requests through [employer] or call your pharmacy and they will forward the refill request to us. Please allow 3 business days for your refill to be completed.          Additional Information About Your Visit        [employer] Information     [employer] lets you send messages to your doctor, view your test results, renew your prescriptions, schedule appointments and more. To sign up, go to [email or web address] . Click on \"Log in\" on the left side of the screen, which will take you to the Welcome page. Then click on \"Sign up Now\" on the right side of the page.     You will be asked to enter the access code " "listed below, as well as some personal information. Please follow the directions to create your username and password.     Your access code is: [email or web address]  Expires: 2017 10:49 AM     Your access code will  in 90 days. If you need help or a new code, please call your [facility] or [phone].        Care EveryWhere ID     This is your Care EveryWhere ID. This could be used by other organizations to access your [facility] medical records  [id number]        Your Vitals Were     Pulse Temperature Respirations Height Pulse Oximetry BMI (Body Mass Index)    55 98  F (36.7  C) (Oral) 12 5' 4\" (1.626 m) 97% 21.8 kg/m2       Blood Pressure from Last 3 Encounters:   17 150/70   17 148/71   17 136/89    Weight from Last 3 Encounters:   17 127 lb (57.6 kg)   17 126 lb 4.8 oz (57.3 kg)   17 135 lb 12.8 oz (61.6 kg)              We Performed the Following     UA reflex to Microscopic and Culture        Primary Care Provider Office Phone # Fax #    Marcy Soares PA-C [phone]       [address] [address]        Thank you!     Thank you for choosing [facility]  for your care. Our goal is always to provide you with excellent care. Hearing back from our patients is one way we can continue to improve our services. Please take a few minutes to complete the written survey that you may receive in the mail after your visit with us. Thank you!             Your Updated Medication List - Protect others around you: Learn how to safely use, store and throw away your medicines at www.disposemymeds.org.          This list is accurate as of: 17  2:44 PM.  Always use your most recent med list.                   Brand Name Dispense Instructions for use    acetaminophen-codeine 300-30 MG per tablet    TYLENOL/codeine #3    270 tablet    Take 1 tablet by mouth every 8 hours as needed for mild pain       " amLODIPine 10 MG tablet    NORVASC    30 tablet    Take 1 tablet (10 mg) by mouth daily       ascorbic acid 500 MG tablet    VITAMIN C    100 Tab    take 1 Tab by mouth daily.       aspirin 81 MG EC tablet     30 tablet    Take 1 tablet (81 mg) by mouth daily       atorvastatin 10 MG tablet    LIPITOR    30 tablet    Take 1 tablet (10 mg) by mouth daily       cefadroxil 500 MG capsule    DURICEF    12 capsule    Take 1 capsule (500 mg) by mouth 2 times daily for 6 days       cloNIDine 0.1 MG tablet    CATAPRES    180 tablet    Take 1 tablet (0.1 mg) by mouth 2 times daily       conjugated estrogens cream    PREMARIN    30 g    Place 0.5 g vaginally three times a week       fish oil-omega-3 fatty acids 1000 MG capsule     180 Cap    take 2 Caps by mouth daily.       gabapentin 300 MG capsule    NEURONTIN    180 capsule    TAKE ONE CAPSULE BY MOUTH THREE TIMES A DAY       isosorbide dinitrate 10 MG tablet    ISORDIL    90 tablet    Take 1 tablet (10 mg) by mouth 3 times daily       lisinopril 40 MG tablet    PRINIVIL/ZESTRIL    90 tablet    Take 1 tablet (40 mg) by mouth daily       metoprolol 100 MG 24 hr tablet    TOPROL-XL    90 tablet    Take 1 tablet (100 mg) by mouth daily       sertraline 50 MG tablet    ZOLOFT    90 tablet    Take 1 tablet (50 mg) by mouth daily       vitamin D 1000 UNITS capsule     100 Cap    take 1 Cap by mouth daily.

## 2017-06-14 DIAGNOSIS — I10 HTN (HYPERTENSION), BENIGN: ICD-10-CM

## 2017-06-14 NOTE — TELEPHONE ENCOUNTER
metoprolol (TOPROL-XL) 100 MG 24 hr tablet      Last Written Prescription Date: 12/13/16  Last Fill Quantity: 90, # refills: 1    Last Office Visit with [facility], [facility] or [facility] prescribing provider:  6/5/2017     Future Office Visit:    Next 5 appointments (look out 90 days)     Jun 16, 2017  2:45 PM CDT   Pre-Op physical with Marcy Soares PA-C   [facility]    [address] [place] [address] [phone]                    BP Readings from Last 3 Encounters:   06/05/17 150/70   06/01/17 148/71   01/13/17 136/89     LUIS ANTONIO Logan  June 14, 2017  1:45 PM

## 2017-06-15 RX ORDER — METOPROLOL SUCCINATE 100 MG/1
TABLET, EXTENDED RELEASE ORAL
Qty: 90 TABLET | Refills: 1 | Status: ON HOLD | OUTPATIENT
Start: 2017-06-15 | End: 2017-06-24

## 2017-06-15 NOTE — TELEPHONE ENCOUNTER
6/1/17 pharmacy med rec notes reviewed.   Prescription approved per [facility] Refill Protocol.  Juliano Pena RN

## 2017-06-16 ENCOUNTER — OFFICE VISIT (OUTPATIENT)
Dept: FAMILY MEDICINE | Facility: CLINIC | Age: 82
End: 2017-06-16
Payer: MEDICARE

## 2017-06-16 VITALS
HEART RATE: 57 BPM | DIASTOLIC BLOOD PRESSURE: 73 MMHG | WEIGHT: 129.6 LBS | BODY MASS INDEX: 22.25 KG/M2 | SYSTOLIC BLOOD PRESSURE: 151 MMHG | TEMPERATURE: 97.5 F | RESPIRATION RATE: 14 BRPM | OXYGEN SATURATION: 96 %

## 2017-06-16 DIAGNOSIS — Z01.818 PREOP GENERAL PHYSICAL EXAM: Primary | ICD-10-CM

## 2017-06-16 DIAGNOSIS — N39.0 URINARY TRACT INFECTION WITHOUT HEMATURIA, SITE UNSPECIFIED: ICD-10-CM

## 2017-06-16 LAB
ALBUMIN UR-MCNC: NEGATIVE MG/DL
APPEARANCE UR: ABNORMAL
BACTERIA #/AREA URNS HPF: ABNORMAL /HPF
BILIRUB UR QL STRIP: NEGATIVE
COLOR UR AUTO: YELLOW
GLUCOSE UR STRIP-MCNC: NEGATIVE MG/DL
HGB UR QL STRIP: ABNORMAL
KETONES UR STRIP-MCNC: NEGATIVE MG/DL
LEUKOCYTE ESTERASE UR QL STRIP: ABNORMAL
NITRATE UR QL: POSITIVE
NON-SQ EPI CELLS #/AREA URNS LPF: ABNORMAL /LPF
PH UR STRIP: 5.5 PH (ref 5–7)
RBC #/AREA URNS AUTO: ABNORMAL /HPF (ref 0–2)
SP GR UR STRIP: 1.01 (ref 1–1.03)
URN SPEC COLLECT METH UR: ABNORMAL
UROBILINOGEN UR STRIP-ACNC: 0.2 EU/DL (ref 0.2–1)
WBC #/AREA URNS AUTO: >100 /HPF (ref 0–2)

## 2017-06-16 PROCEDURE — 99214 OFFICE O/P EST MOD 30 MIN: CPT | Performed by: PHYSICIAN ASSISTANT

## 2017-06-16 PROCEDURE — 87186 SC STD MICRODIL/AGAR DIL: CPT | Performed by: PHYSICIAN ASSISTANT

## 2017-06-16 PROCEDURE — 87086 URINE CULTURE/COLONY COUNT: CPT | Performed by: PHYSICIAN ASSISTANT

## 2017-06-16 PROCEDURE — 81001 URINALYSIS AUTO W/SCOPE: CPT | Performed by: PHYSICIAN ASSISTANT

## 2017-06-16 PROCEDURE — 87088 URINE BACTERIA CULTURE: CPT | Performed by: PHYSICIAN ASSISTANT

## 2017-06-16 RX ORDER — CIPROFLOXACIN 250 MG/1
250 TABLET, FILM COATED ORAL 2 TIMES DAILY
Qty: 14 TABLET | Refills: 0 | Status: ON HOLD | OUTPATIENT
Start: 2017-06-16 | End: 2017-06-21

## 2017-06-16 NOTE — MR AVS SNAPSHOT
After Visit Summary   6/16/2017    Daya Ignacio    MRN: [id number]           Patient Information     Date Of Birth          10/11/1930        Visit Information        Provider Department      6/16/2017 2:45 PM Marcy Soares PA-C [facility]        Today's Diagnoses     Preop general physical exam    -  1    Urinary tract infection without hematuria, site unspecified          Care Instructions      Before Your Surgery      Call your surgeon if there is any change in your health. This includes signs of a cold or flu (such as a sore throat, runny nose, cough, rash or fever).    Do not smoke, drink alcohol or take over the counter medicine (unless your surgeon or primary care doctor tells you to) for the 24 hours before and after surgery.    If you take prescribed drugs: Follow your doctor s orders about which medicines to take and which to stop until after surgery.    Eating and drinking prior to surgery: follow the instructions from your surgeon    Take a shower or bath the night before surgery. Use the soap your surgeon gave you to gently clean your skin. If you do not have soap from your surgeon, use your regular soap. Do not shave or scrub the surgery site.  Wear clean pajamas and have clean sheets on your bed.           Follow-ups after your visit        Your next 10 appointments already scheduled     Jun 21, 2017  2:30 PM CDT   Return Discharge with Art Evans PA-C   [facility] [place] ([facility])    [address] [address] [phone]              Who to contact     If you have questions or need follow up information about today's clinic visit or your schedule please contact [facility] directly at [phone].  Normal or non-critical lab and imaging results will be communicated to you by MyChart, letter or phone within 4 business days after the clinic has received the  "results. If you do not hear from us within 7 days, please contact the clinic through [employer] or phone. If you have a critical or abnormal lab result, we will notify you by phone as soon as possible.  Submit refill requests through [employer] or call your pharmacy and they will forward the refill request to us. Please allow 3 business days for your refill to be completed.          Additional Information About Your Visit        [employer] Information     [employer] lets you send messages to your doctor, view your test results, renew your prescriptions, schedule appointments and more. To sign up, go to [email or web address] . Click on \"Log in\" on the left side of the screen, which will take you to the Welcome page. Then click on \"Sign up Now\" on the right side of the page.     You will be asked to enter the access code listed below, as well as some personal information. Please follow the directions to create your username and password.     Your access code is: [email or web address]  Expires: 2017 10:49 AM     Your access code will  in 90 days. If you need help or a new code, please call your [facility] clinic or [phone].        Care EveryWhere ID     This is your Care EveryWhere ID. This could be used by other organizations to access your [facility] medical records  [id number]        Your Vitals Were     Pulse Temperature Respirations Pulse Oximetry BMI (Body Mass Index)       57 97.5  F (36.4  C) (Oral) 14 96% 22.25 kg/m2        Blood Pressure from Last 3 Encounters:   17 151/73   17 150/70   17 148/71    Weight from Last 3 Encounters:   17 129 lb 9.6 oz (58.8 kg)   17 127 lb (57.6 kg)   17 126 lb 4.8 oz (57.3 kg)              We Performed the Following     *UA reflex to Microscopic and Culture ([place] and [facility] (except [place] and [place])     Urine Culture Aerobic Bacterial     Urine Microscopic          Today's Medication Changes          These changes are accurate as " of: 6/16/17  3:29 PM.  If you have any questions, ask your nurse or doctor.               Start taking these medicines.        Dose/Directions    ciprofloxacin 250 MG tablet   Commonly known as:  CIPRO   Used for:  Urinary tract infection without hematuria, site unspecified   Started by:  Marcy Soares PA-C        Dose:  250 mg   Take 1 tablet (250 mg) by mouth 2 times daily for 7 days   Quantity:  14 tablet   Refills:  0            Where to get your medicines      These medications were sent to [address] [address]     Phone:  [phone]     ciprofloxacin 250 MG tablet                Primary Care Provider Office Phone # Fax #    Marcy Soares PA-C [phone]       [facility] [id number] [place] [id number]        Thank you!     Thank you for choosing [facility]  for your care. Our goal is always to provide you with excellent care. Hearing back from our patients is one way we can continue to improve our services. Please take a few minutes to complete the written survey that you may receive in the mail after your visit with us. Thank you!             Your Updated Medication List - Protect others around you: Learn how to safely use, store and throw away your medicines at www.disposemymeds.org.          This list is accurate as of: 6/16/17  3:29 PM.  Always use your most recent med list.                   Brand Name Dispense Instructions for use    acetaminophen-codeine 300-30 MG per tablet    TYLENOL/codeine #3    270 tablet    Take 1 tablet by mouth every 8 hours as needed for mild pain       amLODIPine 10 MG tablet    NORVASC    30 tablet    Take 1 tablet (10 mg) by mouth daily       ascorbic acid 500 MG tablet    VITAMIN C    100 Tab    take 1 Tab by mouth daily.       aspirin 81 MG EC tablet     30 tablet    Take 1 tablet (81 mg) by mouth daily        atorvastatin 10 MG tablet    LIPITOR    30 tablet    Take 1 tablet (10 mg) by mouth daily       ciprofloxacin 250 MG tablet    CIPRO    14 tablet    Take 1 tablet (250 mg) by mouth 2 times daily for 7 days       cloNIDine 0.1 MG tablet    CATAPRES    180 tablet    Take 1 tablet (0.1 mg) by mouth 2 times daily       conjugated estrogens cream    PREMARIN    30 g    Place 0.5 g vaginally three times a week       fish oil-omega-3 fatty acids 1000 MG capsule     180 Cap    take 2 Caps by mouth daily.       gabapentin 300 MG capsule    NEURONTIN    180 capsule    TAKE ONE CAPSULE BY MOUTH THREE TIMES A DAY       isosorbide dinitrate 10 MG tablet    ISORDIL    90 tablet    Take 1 tablet (10 mg) by mouth 3 times daily       lisinopril 40 MG tablet    PRINIVIL/ZESTRIL    90 tablet    Take 1 tablet (40 mg) by mouth daily       metoprolol 100 MG 24 hr tablet    TOPROL-XL    90 tablet    TAKE ONE TABLET BY MOUTH DAILY       sertraline 50 MG tablet    ZOLOFT    90 tablet    Take 1 tablet (50 mg) by mouth daily       vitamin D 1000 UNITS capsule     100 Cap    take 1 Cap by mouth daily.

## 2017-06-16 NOTE — PROGRESS NOTES
[facility]  [address] [facility] [address]  [phone]  Dept: [phone]    PRE-OP EVALUATION:  Today's date: 2017    Daya Ignacio (: 10/11/1930) presents for pre-operative evaluation assessment as requested by Dr. Mitesh Fritz.  She requires evaluation and anesthesia risk assessment prior to undergoing surgery/procedure for treatment of right vitreous hemorrhage .  Proposed procedure: right vitrectomy    Date of Surgery/ Procedure: 17  Time of Surgery/ Procedure: 1:30 pm  Hospital/Surgical Facility: [facility] [phone] & [phone]  Primary Physician: Marcy Soares  Type of Anesthesia Anticipated: Retrobulbar Block    Patient has a Health Care Directive or Living Will:  YES     1. YES - Do you have a history of heart attack, stroke, stent, bypass or surgery on an artery in the head, neck, heart or legs? Heart attack  2. NO - Do you ever have any pain or discomfort in your chest?  3. NO - Do you have a history of  Heart Failure?  4. NO - Are you troubled by shortness of breath when: walking on the level, up a slight hill or at night?  5. NO - Do you currently have a cold, bronchitis or other respiratory infection?  6. NO - Do you have a cough, shortness of breath or wheezing?  7. NO - Do you sometimes get pains in the calves of your legs when you walk?  8. NO - Do you or anyone in your family have previous history of blood clots?  9. NO - Do you or does anyone in your family have a serious bleeding problem such as prolonged bleeding following surgeries or cuts?  10. NO - Have you ever had problems with anemia or been told to take iron pills?  11. NO - Have you had any abnormal blood loss such as black, tarry or bloody stools, or abnormal vaginal bleeding?  12. YES - Have you ever had a blood transfusion?  13. NO - Have you or any of your relatives ever had problems with anesthesia?  14. NO - Do you have sleep apnea,  excessive snoring or daytime drowsiness?  15. NO - Do you have any prosthetic heart valves?  16. YES - Do you have prosthetic joints? hips  17. NO - Is there any chance that you may be pregnant?      HPI:                                                      Brief HPI related to upcoming procedure: vitreous hemorhage      See problem list for active medical problems.  Problems all longstanding and stable, except as noted/documented.  See ROS for pertinent symptoms related to these conditions.                                                                                                  .    MEDICAL HISTORY:                                                      Patient Active Problem List    Diagnosis Date Noted     Urinary tract infection without hematuria 06/01/2017     Priority: Medium     NSTEMI (non-ST elevated myocardial infarction) (H) 05/28/2017     Priority: Medium     Hypertension goal BP (blood pressure) < 140/90 09/25/2015     Priority: Medium     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 04/16/2014     Priority: Medium     Chronic pain 03/10/2017     Patient is followed by Marcy Soares PA-C, [facility] for ongoing prescription of pain medication.  All refills should only be approved by this provider, or covering partner.    Medication(s): acetaminophen-codeine (TYLENOL/CODEINE #3) 300-30 MG per tablet   Maximum quantity per month: 270  Clinic visit frequency required: Q 6  months     Controlled substance agreement:  Encounter-Level CSA:     There are no encounter-level csa.        Pain Clinic evaluation in the past: No    DIRE Total Score(s):  No flowsheet data found.    Last [facility] website verification:  done on 3/10/2017   [email or web address]         HTN (hypertension) 09/24/2012     Vitamin D deficiency 09/04/2012     Advanced directives, counseling/discussion 06/17/2011     Advance Directive Problem List Overview:   Name Relationship Phone    Primary Health Care Agent             Alternative Health Care Agent          Patient states has Advance Directive and will bring in a copy to clinic. 6/17/2011        Hip fracture (H) 02/28/2011     HYPERLIPIDEMIA LDL GOAL <130 10/31/2010     Osteoporosis 09/10/2010     Sciatica 09/10/2010     Generalized anxiety disorder 07/09/2007     describes chronic low lever anxiety/depression        Past Medical History:   Diagnosis Date     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 4/16/2014     Closed fracture of unspecified part of neck of femur      Closed fracture of unspecified part of vertebral column without mention of spinal cord injury     from fall summer 06     Elevated blood pressure reading without diagnosis of hypertension      Hypertension      Macular degeneration (senile) of retina, unspecified      Past Surgical History:   Procedure Laterality Date     C NONSPECIFIC PROCEDURE      lt hip pain     ORTHOPEDIC SURGERY       Current Outpatient Prescriptions   Medication Sig Dispense Refill     metoprolol (TOPROL-XL) 100 MG 24 hr tablet TAKE ONE TABLET BY MOUTH DAILY 90 tablet 1     aspirin EC 81 MG EC tablet Take 1 tablet (81 mg) by mouth daily 30 tablet 3     isosorbide dinitrate (ISORDIL) 10 MG tablet Take 1 tablet (10 mg) by mouth 3 times daily 90 tablet 3     atorvastatin (LIPITOR) 10 MG tablet Take 1 tablet (10 mg) by mouth daily 30 tablet 3     amLODIPine (NORVASC) 10 MG tablet Take 1 tablet (10 mg) by mouth daily 30 tablet 3     gabapentin (NEURONTIN) 300 MG capsule TAKE ONE CAPSULE BY MOUTH THREE TIMES A  capsule 1     acetaminophen-codeine (TYLENOL/CODEINE #3) 300-30 MG per tablet Take 1 tablet by mouth every 8 hours as needed for mild pain 270 tablet 0     sertraline (ZOLOFT) 50 MG tablet Take 1 tablet (50 mg) by mouth daily 90 tablet 1     lisinopril (PRINIVIL/ZESTRIL) 40 MG tablet Take 1 tablet (40 mg) by mouth daily 90 tablet 1     cloNIDine (CATAPRES) 0.1 MG tablet Take 1 tablet (0.1 mg) by mouth 2 times daily 180 tablet 2      conjugated estrogens (PREMARIN) vaginal cream Place 0.5 g vaginally three times a week 30 g 6     ascorbic acid (VITAMIN C) 500 MG tablet take 1 Tab by mouth daily. 100 Tab 12     Cholecalciferol (VITAMIN D) 1000 UNIT capsule take 1 Cap by mouth daily. 100 Cap 12     fish oil-omega-3 fatty acids 1000 MG capsule take 2 Caps by mouth daily. 180 Cap 12     OTC products: None, except as noted above    Allergies   Allergen Reactions     Macrobid [Nitrofurantoin Anhydrous] Other (See Comments)     Body aches     Penicillins      Sulfa Drugs       Latex Allergy: NO    Social History   Substance Use Topics     Smoking status: Never Smoker     Smokeless tobacco: Never Used     Alcohol use No     History   Drug Use No       REVIEW OF SYSTEMS:                                                    Constitutional, neuro, ENT, endocrine, pulmonary, cardiac, gastrointestinal, genitourinary, musculoskeletal, integument and psychiatric systems are negative, except as otherwise noted.    EXAM:                                                    /73 (BP Location: Right arm, Patient Position: Chair, Cuff Size: Adult Regular)  Pulse 57  Temp 97.5  F (36.4  C) (Oral)  Resp 14  Wt 129 lb 9.6 oz (58.8 kg)  SpO2 96%  BMI 22.25 kg/m2    GENERAL APPEARANCE: healthy, alert and no distress     EYES: EOMI, PERRL     HENT: ear canals and TM's normal and nose and mouth without ulcers or lesions     NECK: no adenopathy, no asymmetry, masses, or scars and thyroid normal to palpation     RESP: lungs clear to auscultation - no rales, rhonchi or wheezes     CV: regular rates and rhythm, normal S1 S2, no S3 or S4 and no murmur, click or rub     ABDOMEN:  soft, nontender, no HSM or masses and bowel sounds normal     MS: extremities normal- no gross deformities noted, no evidence of inflammation in joints, FROM in all extremities.     SKIN: no suspicious lesions or rashes     NEURO: Normal strength and tone, sensory exam grossly normal,  mentation intact and speech normal     PSYCH: mentation appears normal. and affect normal/bright     LYMPHATICS: No axillary, cervical, or supraclavicular nodes    DIAGNOSTICS:                                                    No labs or EKG required for low risk surgery (cataract, skin procedure, breast biopsy, etc)    Recent Labs   Lab Test  06/01/17   0642  05/31/17   1625   05/31/17   0636  05/30/17   0643  05/29/17   0727   01/13/17   1020   08/08/15   0920   06/27/11   0625   06/26/11   0533   HGB   --    --    --    --   12.3  12.9   < >   --    < >   --    < >  9.4*   --   7.9*   PLT   --    --    --   115*  105*  105*   < >   --    < >   --    < >  102*   --   80*   INR   --    --    --    --    --    --    --    --    --    --    --   1.21*   --   1.30*   NA   --    --    --   138   --   134   < >  137   < >   --    < >   --    --    --    POTASSIUM  3.5  3.6   < >  3.0*   --   3.7   < >  3.4   < >   --    < >  3.5   < >   --    CR   --    --    --   0.47*   --   0.50*   < >  0.71   < >   --    < >   --    --    --    A1C   --    --    --    --    --    --    --   5.3   --   5.2   --    --    --    --     < > = values in this interval not displayed.        IMPRESSION:                                                    Reason for surgery/procedure: R Vitrectomy  Diagnosis/reason for consult: preop    The proposed surgical procedure is considered LOW risk.    REVISED CARDIAC RISK INDEX  The patient has the following serious cardiovascular risks for perioperative complications such as (MI, PE, VFib and 3  AV Block):  Coronary Artery Disease (MI, positive stress test, angina, Qs on EKG)  INTERPRETATION: 1 risks: Class II (low risk - 0.9% complication rate)    The patient has the following additional risks for perioperative complications:  No identified additional risks        RECOMMENDATIONS:                                                              APPROVAL GIVEN to proceed with proposed procedure,  without further diagnostic evaluation       Signed Electronically by: Marcy Soares PA-C, PA-C    Copy of this evaluation report is provided to requesting physician.    [facility] Preop Guidelines

## 2017-06-16 NOTE — NURSING NOTE
"Chief Complaint   Patient presents with     Pre-Op Exam     eye surgery       Initial /73 (BP Location: Right arm, Patient Position: Chair, Cuff Size: Adult Regular)  Pulse 57  Temp 97.5  F (36.4  C) (Oral)  Resp 14  Wt 129 lb 9.6 oz (58.8 kg)  SpO2 96%  BMI 22.25 kg/m2 Estimated body mass index is 22.25 kg/(m^2) as calculated from the following:    Height as of 6/5/17: 5' 4\" (1.626 m).    Weight as of this encounter: 129 lb 9.6 oz (58.8 kg).  Medication Reconciliation: complete   SMA Kurt      "

## 2017-06-16 NOTE — PROGRESS NOTES
SUBJECTIVE:                                                    Daya Ignacio is a 86 year old female who presents to clinic today for the following health issues:      URINARY TRACT SYMPTOMS     Onset: 2 days    Description:   Painful urination (Dysuria): YES  Blood in urine (Hematuria): no   Delay in urine (Hesitency): no     Intensity: moderate    Progression of Symptoms:  waxing and waning    Accompanying Signs & Symptoms:  Fever/chills: no   Flank pain no   Nausea and vomiting: no   Any vaginal symptoms: none  Abdominal/Pelvic Pain: no    History:   History of frequent UTI's: YES  History of kidney stones: no   Sexually Active: no   Possibility of pregnancy: No    Precipitating factors:            Therapies Tried and outcome: Cranberry juice prn (contraindicated in Coumadin patients)            Problem list and histories reviewed & adjusted, as indicated.  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     Hypertension goal BP (blood pressure) < 140/90     Chronic pain     NSTEMI (non-ST elevated myocardial infarction) (H)     Urinary tract infection without hematuria     Past Surgical History:   Procedure Laterality Date     C NONSPECIFIC PROCEDURE      lt hip pain     ORTHOPEDIC SURGERY         Social History   Substance Use Topics     Smoking status: Never Smoker     Smokeless tobacco: Never Used     Alcohol use No     Family History   Problem Relation Age of Onset     Alzheimer Disease Mother      mild     Cardiovascular Father      heart attack     Neurologic Disorder Brother 83     Parkinson's           Reviewed and updated as needed this visit by clinical staff  Tobacco  Allergies  Med Hx  Surg Hx  Fam Hx  Soc Hx       Reviewed and updated as needed this visit by Provider         ROS:  Constitutional, HEENT, cardiovascular, pulmonary, gi and gu systems are negative, except as otherwise noted.    OBJECTIVE:                                                    /73 (BP Location: Right arm, Patient Position: Chair, Cuff Size: Adult Regular)  Pulse 57  Temp 97.5  F (36.4  C) (Oral)  Resp 14  Wt 129 lb 9.6 oz (58.8 kg)  SpO2 96%  BMI 22.25 kg/m2  Body mass index is 22.25 kg/(m^2).  GENERAL APPEARANCE: healthy, alert and no distress  RESP: lungs clear to auscultation - no rales, rhonchi or wheezes  CV: regular rates and rhythm, normal S1 S2, no S3 or S4 and no murmur, click or rub  ABDOMEN: soft, nontender, without hepatosplenomegaly or masses and bowel sounds normal    Diagnostic Test Results:  Results for orders placed or performed in visit on 06/16/17 (from the past 24 hour(s))   *UA reflex to Microscopic and Culture ([place] and [facility] (except [place] and [place])   Result Value Ref Range    Color Urine Yellow     Appearance Urine Cloudy     Glucose Urine Negative NEG mg/dL    Bilirubin Urine Negative NEG    Ketones Urine Negative NEG mg/dL    Specific Gravity Urine 1.015 1.003 - 1.035    Blood Urine Trace (A) NEG    pH Urine 5.5 5.0 - 7.0 pH    Protein Albumin Urine Negative NEG mg/dL    Urobilinogen Urine 0.2 0.2 - 1.0 EU/dL    Nitrite Urine Positive (A) NEG    Leukocyte Esterase Urine Large (A) NEG    Source Midstream Urine    Urine Microscopic   Result Value Ref Range    WBC Urine >100 (A) 0 - 2 /HPF    RBC Urine 2-5 (A) 0 - 2 /HPF    Squamous Epithelial /LPF Urine Few FEW /LPF    Bacteria Urine Many (A) NEG /HPF        ASSESSMENT/PLAN:                                                            1. Preop general physical exam      2. Urinary tract infection without hematuria, site unspecified  Call if symptoms persist or worsen, otherwise pt will have consult with urology  - *UA reflex to Microscopic and  Culture ([place] and [facility] (except [place] and [place])  - Urine Culture Aerobic Bacterial  - Urine Microscopic  - ciprofloxacin (CIPRO) 250 MG tablet; Take 1 tablet (250 mg) by mouth 2 times daily for 7 days  Dispense: 14 tablet; Refill: 0        Marcy Soares PA-C, PA-C  [facility]

## 2017-06-19 ENCOUNTER — APPOINTMENT (OUTPATIENT)
Dept: GENERAL RADIOLOGY | Facility: CLINIC | Age: 82
DRG: 393 | End: 2017-06-19
Attending: EMERGENCY MEDICINE
Payer: MEDICARE

## 2017-06-19 ENCOUNTER — HOSPITAL ENCOUNTER (OUTPATIENT)
Facility: CLINIC | Age: 82
Setting detail: OBSERVATION
Discharge: HOME | DRG: 393 | End: 2017-06-21
Attending: EMERGENCY MEDICINE | Admitting: INTERNAL MEDICINE
Payer: MEDICARE
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
## 2017-06-19 DIAGNOSIS — M25.552 HIP PAIN, LEFT: ICD-10-CM

## 2017-06-19 DIAGNOSIS — N39.0 URINARY TRACT INFECTION WITHOUT HEMATURIA, SITE UNSPECIFIED: ICD-10-CM

## 2017-06-19 DIAGNOSIS — S72.102A CLOSED FRACTURE OF TROCHANTER OF LEFT FEMUR, INITIAL ENCOUNTER (H): ICD-10-CM

## 2017-06-19 DIAGNOSIS — B37.0 ORAL THRUSH: Primary | ICD-10-CM

## 2017-06-19 DIAGNOSIS — N95.1 SYMPTOMATIC MENOPAUSAL OR FEMALE CLIMACTERIC STATES: ICD-10-CM

## 2017-06-19 PROBLEM — M25.559 HIP PAIN: Status: ACTIVE | Noted: 2017-06-19

## 2017-06-19 LAB
ALBUMIN UR-MCNC: NEGATIVE MG/DL
ANION GAP SERPL CALCULATED.3IONS-SCNC: 5 MMOL/L (ref 3–14)
APPEARANCE UR: CLEAR
BACTERIA SPEC CULT: ABNORMAL
BASOPHILS # BLD AUTO: 0 10E9/L (ref 0–0.2)
BASOPHILS NFR BLD AUTO: 0.3 %
BILIRUB UR QL STRIP: NEGATIVE
BUN SERPL-MCNC: 17 MG/DL (ref 7–30)
CALCIUM SERPL-MCNC: 9 MG/DL (ref 8.5–10.1)
CHLORIDE SERPL-SCNC: 105 MMOL/L (ref 94–109)
CO2 SERPL-SCNC: 27 MMOL/L (ref 20–32)
COLOR UR AUTO: YELLOW
CREAT SERPL-MCNC: 0.57 MG/DL (ref 0.52–1.04)
DIFFERENTIAL METHOD BLD: ABNORMAL
EOSINOPHIL # BLD AUTO: 0 10E9/L (ref 0–0.7)
EOSINOPHIL NFR BLD AUTO: 0 %
ERYTHROCYTE [DISTWIDTH] IN BLOOD BY AUTOMATED COUNT: 14.8 % (ref 10–15)
GFR SERPL CREATININE-BSD FRML MDRD: ABNORMAL ML/MIN/1.7M2
GLUCOSE SERPL-MCNC: 140 MG/DL (ref 70–99)
GLUCOSE UR STRIP-MCNC: NEGATIVE MG/DL
HCT VFR BLD AUTO: 36.5 % (ref 35–47)
HGB BLD-MCNC: 11.7 G/DL (ref 11.7–15.7)
HGB UR QL STRIP: NEGATIVE
IMM GRANULOCYTES # BLD: 0.1 10E9/L (ref 0–0.4)
IMM GRANULOCYTES NFR BLD: 0.5 %
KETONES UR STRIP-MCNC: 5 MG/DL
LEUKOCYTE ESTERASE UR QL STRIP: NEGATIVE
LYMPHOCYTES # BLD AUTO: 0.7 10E9/L (ref 0.8–5.3)
LYMPHOCYTES NFR BLD AUTO: 4.7 %
MCH RBC QN AUTO: 30.5 PG (ref 26.5–33)
MCHC RBC AUTO-ENTMCNC: 32.1 G/DL (ref 31.5–36.5)
MCV RBC AUTO: 95 FL (ref 78–100)
MICRO REPORT STATUS: ABNORMAL
MICROORGANISM SPEC CULT: ABNORMAL
MONOCYTES # BLD AUTO: 0.7 10E9/L (ref 0–1.3)
MONOCYTES NFR BLD AUTO: 4.6 %
MUCOUS THREADS #/AREA URNS LPF: PRESENT /LPF
NEUTROPHILS # BLD AUTO: 13.8 10E9/L (ref 1.6–8.3)
NEUTROPHILS NFR BLD AUTO: 89.9 %
NITRATE UR QL: NEGATIVE
NRBC # BLD AUTO: 0 10*3/UL
NRBC BLD AUTO-RTO: 0 /100
PH UR STRIP: 7 PH (ref 5–7)
PLATELET # BLD AUTO: 189 10E9/L (ref 150–450)
POTASSIUM SERPL-SCNC: 3.6 MMOL/L (ref 3.4–5.3)
RBC # BLD AUTO: 3.84 10E12/L (ref 3.8–5.2)
RBC #/AREA URNS AUTO: <1 /HPF (ref 0–2)
SODIUM SERPL-SCNC: 137 MMOL/L (ref 133–144)
SP GR UR STRIP: 1.01 (ref 1–1.03)
SPECIMEN SOURCE: ABNORMAL
TROPONIN I SERPL-MCNC: NORMAL UG/L (ref 0–0.04)
URN SPEC COLLECT METH UR: ABNORMAL
UROBILINOGEN UR STRIP-MCNC: 0 MG/DL (ref 0–2)
WBC # BLD AUTO: 15.4 10E9/L (ref 4–11)
WBC #/AREA URNS AUTO: <1 /HPF (ref 0–2)

## 2017-06-19 PROCEDURE — 99219 ZZC INITIAL OBSERVATION CARE,LEVL II: CPT | Performed by: INTERNAL MEDICINE

## 2017-06-19 RX ORDER — HYDROMORPHONE HCL/0.9% NACL/PF 0.2MG/0.2
0.2 SYRINGE (ML) INTRAVENOUS
Status: DISCONTINUED | OUTPATIENT
Start: 2017-06-19 | End: 2017-06-21

## 2017-06-19 RX ORDER — METHYLPREDNISOLONE 4 MG/1
4 TABLET ORAL ONCE
Status: DISCONTINUED | OUTPATIENT
Start: 2017-06-19 | End: 2017-06-20

## 2017-06-19 RX ORDER — LISINOPRIL 20 MG/1
40 TABLET ORAL DAILY
Status: DISCONTINUED | OUTPATIENT
Start: 2017-06-20 | End: 2017-06-21 | Stop reason: HOSPADM

## 2017-06-19 RX ORDER — CLONIDINE HYDROCHLORIDE 0.1 MG/1
0.1 TABLET ORAL 2 TIMES DAILY
Status: DISCONTINUED | OUTPATIENT
Start: 2017-06-19 | End: 2017-06-21 | Stop reason: HOSPADM

## 2017-06-19 RX ORDER — AMLODIPINE BESYLATE 5 MG/1
10 TABLET ORAL DAILY
Status: DISCONTINUED | OUTPATIENT
Start: 2017-06-20 | End: 2017-06-21 | Stop reason: HOSPADM

## 2017-06-19 RX ORDER — METHYLPREDNISOLONE 4 MG/1
4 TABLET ORAL
Status: DISCONTINUED | OUTPATIENT
Start: 2017-06-20 | End: 2017-06-20

## 2017-06-19 RX ORDER — METHYLPREDNISOLONE 4 MG/1
8 TABLET ORAL ONCE
Status: DISCONTINUED | OUTPATIENT
Start: 2017-06-19 | End: 2017-06-20

## 2017-06-19 RX ORDER — CIPROFLOXACIN 250 MG/1
250 TABLET, FILM COATED ORAL 2 TIMES DAILY
Status: DISCONTINUED | OUTPATIENT
Start: 2017-06-19 | End: 2017-06-21 | Stop reason: HOSPADM

## 2017-06-19 RX ORDER — LANOLIN ALCOHOL/MO/W.PET/CERES
1000 CREAM (GRAM) TOPICAL EVERY MORNING
COMMUNITY
End: 2019-05-26

## 2017-06-19 RX ORDER — POLYETHYLENE GLYCOL 3350 17 G/17G
17 POWDER, FOR SOLUTION ORAL DAILY PRN
Status: DISCONTINUED | OUTPATIENT
Start: 2017-06-19 | End: 2017-06-21 | Stop reason: HOSPADM

## 2017-06-19 RX ORDER — ISOSORBIDE DINITRATE 10 MG/1
10 TABLET ORAL 3 TIMES DAILY
Status: DISCONTINUED | OUTPATIENT
Start: 2017-06-19 | End: 2017-06-21 | Stop reason: HOSPADM

## 2017-06-19 RX ORDER — ACETAMINOPHEN 325 MG/1
650 TABLET ORAL EVERY 4 HOURS PRN
Status: DISCONTINUED | OUTPATIENT
Start: 2017-06-19 | End: 2017-06-21 | Stop reason: HOSPADM

## 2017-06-19 RX ORDER — METOPROLOL SUCCINATE 25 MG/1
100 TABLET, EXTENDED RELEASE ORAL DAILY
Status: DISCONTINUED | OUTPATIENT
Start: 2017-06-20 | End: 2017-06-21 | Stop reason: HOSPADM

## 2017-06-19 RX ORDER — METHYLPREDNISOLONE 4 MG/1
8 TABLET ORAL AT BEDTIME
Status: DISCONTINUED | OUTPATIENT
Start: 2017-06-19 | End: 2017-06-20

## 2017-06-19 RX ORDER — METHYLPREDNISOLONE 4 MG/1
4 TABLET ORAL ONCE
Status: COMPLETED | OUTPATIENT
Start: 2017-06-19 | End: 2017-06-19

## 2017-06-19 RX ORDER — AMOXICILLIN 250 MG
1-2 CAPSULE ORAL 2 TIMES DAILY
Status: DISCONTINUED | OUTPATIENT
Start: 2017-06-19 | End: 2017-06-21 | Stop reason: HOSPADM

## 2017-06-19 RX ORDER — HYDROCODONE BITARTRATE AND ACETAMINOPHEN 5; 325 MG/1; MG/1
1-2 TABLET ORAL EVERY 4 HOURS PRN
Status: DISCONTINUED | OUTPATIENT
Start: 2017-06-19 | End: 2017-06-20

## 2017-06-19 RX ORDER — LIDOCAINE 40 MG/G
CREAM TOPICAL
Status: DISCONTINUED | OUTPATIENT
Start: 2017-06-19 | End: 2017-06-19

## 2017-06-19 RX ORDER — TEMAZEPAM 7.5 MG/1
7.5 CAPSULE ORAL
Status: DISCONTINUED | OUTPATIENT
Start: 2017-06-19 | End: 2017-06-21 | Stop reason: HOSPADM

## 2017-06-19 RX ORDER — HYDROCODONE BITARTRATE AND ACETAMINOPHEN 5; 325 MG/1; MG/1
1 TABLET ORAL ONCE
Status: COMPLETED | OUTPATIENT
Start: 2017-06-19 | End: 2017-06-19

## 2017-06-19 RX ORDER — NALOXONE HYDROCHLORIDE 0.4 MG/ML
.1-.4 INJECTION, SOLUTION INTRAMUSCULAR; INTRAVENOUS; SUBCUTANEOUS
Status: DISCONTINUED | OUTPATIENT
Start: 2017-06-19 | End: 2017-06-21 | Stop reason: HOSPADM

## 2017-06-19 RX ORDER — METHYLPREDNISOLONE 4 MG/1
4 TABLET ORAL AT BEDTIME
Status: DISCONTINUED | OUTPATIENT
Start: 2017-06-21 | End: 2017-06-20

## 2017-06-19 RX ORDER — SODIUM CHLORIDE 9 MG/ML
1000 INJECTION, SOLUTION INTRAVENOUS CONTINUOUS
Status: DISCONTINUED | OUTPATIENT
Start: 2017-06-19 | End: 2017-06-19

## 2017-06-19 RX ORDER — ONDANSETRON 2 MG/ML
4 INJECTION INTRAMUSCULAR; INTRAVENOUS EVERY 6 HOURS PRN
Status: DISCONTINUED | OUTPATIENT
Start: 2017-06-19 | End: 2017-06-21 | Stop reason: HOSPADM

## 2017-06-19 RX ORDER — ASPIRIN 81 MG/1
81 TABLET ORAL DAILY
Status: DISCONTINUED | OUTPATIENT
Start: 2017-06-20 | End: 2017-06-21 | Stop reason: HOSPADM

## 2017-06-19 RX ORDER — ONDANSETRON 4 MG/1
4 TABLET, ORALLY DISINTEGRATING ORAL EVERY 6 HOURS PRN
Status: DISCONTINUED | OUTPATIENT
Start: 2017-06-19 | End: 2017-06-21 | Stop reason: HOSPADM

## 2017-06-19 RX ORDER — ACETAMINOPHEN 160 MG
2000 TABLET,DISINTEGRATING ORAL DAILY
COMMUNITY

## 2017-06-19 RX ORDER — VIT A/VIT C/VIT E/ZINC/COPPER 4296-226
1 CAPSULE ORAL 2 TIMES DAILY
COMMUNITY

## 2017-06-19 RX ORDER — GABAPENTIN 300 MG/1
300 CAPSULE ORAL 3 TIMES DAILY
Status: DISCONTINUED | OUTPATIENT
Start: 2017-06-19 | End: 2017-06-21 | Stop reason: HOSPADM

## 2017-06-19 RX ADMIN — ISOSORBIDE DINITRATE 10 MG: 10 TABLET ORAL at 22:10

## 2017-06-19 RX ADMIN — CIPROFLOXACIN HYDROCHLORIDE 250 MG: 250 TABLET, FILM COATED ORAL at 22:08

## 2017-06-19 RX ADMIN — METHYLPREDNISOLONE 4 MG: 4 TABLET ORAL at 22:12

## 2017-06-19 RX ADMIN — GABAPENTIN 300 MG: 300 CAPSULE ORAL at 22:10

## 2017-06-19 RX ADMIN — HYDROCODONE BITARTRATE AND ACETAMINOPHEN 1 TABLET: 5; 325 TABLET ORAL at 20:50

## 2017-06-19 RX ADMIN — SODIUM CHLORIDE 250 ML: 9 INJECTION, SOLUTION INTRAVENOUS at 13:26

## 2017-06-19 RX ADMIN — HYDROCODONE BITARTRATE AND ACETAMINOPHEN 1 TABLET: 5; 325 TABLET ORAL at 20:04

## 2017-06-19 RX ADMIN — HYDROCODONE BITARTRATE AND ACETAMINOPHEN 1 TABLET: 5; 325 TABLET ORAL at 15:54

## 2017-06-19 RX ADMIN — CLONIDINE HYDROCHLORIDE 0.1 MG: 0.1 TABLET ORAL at 22:09

## 2017-06-19 RX ADMIN — SODIUM CHLORIDE 1000 ML: 9 INJECTION, SOLUTION INTRAVENOUS at 17:29

## 2017-06-19 ASSESSMENT — ENCOUNTER SYMPTOMS
DIARRHEA: 0
NECK PAIN: 0
FEVER: 0
ABDOMINAL PAIN: 0
NAUSEA: 0
COUGH: 0
CHILLS: 0
COLOR CHANGE: 1
BACK PAIN: 1
HEMATURIA: 0
VOMITING: 0
ARTHRALGIAS: 1
SHORTNESS OF BREATH: 0
BLOOD IN STOOL: 0
DYSURIA: 0

## 2017-06-19 NOTE — ED NOTES
"Attempted to ambulate pt per MD request.  Pt was unable to move her left leg to the side of the bed without pain.  Pt states she cannot get up though she \"really wants to\".  MD notified.  "

## 2017-06-19 NOTE — ED NOTES
[facility]  ED Nurse Handoff Report    Daya Ignacio is a 86 year old female   ED Chief complaint: Generalized Weakness  . ED Diagnosis:   Final diagnoses:   Hip pain, left   Fall, initial encounter     Allergies:   Allergies   Allergen Reactions     Atorvastatin Muscle Pain (Myalgia)     Macrobid [Nitrofurantoin Anhydrous] Other (See Comments)     Body aches     Penicillins      Sulfa Drugs        Code Status: Full Code  Activity level - Baseline/Home:  Total Care. Activity Level - Current:   Total Care. Lift room needed: Yes. Bariatric: No   Needed: No   Isolation: No. Infection: Not Applicable.     Vital Signs:   Vitals:    06/19/17 1615 06/19/17 1616 06/19/17 1636 06/19/17 1639   BP: 94/58  166/81    Resp:       Temp:       TempSrc:       SpO2:  92%  95%       Cardiac Rhythm:  ,      Pain level: 0-10 Pain Scale: 7  Patient confused: No. Patient Falls Risk: Yes.   Elimination Status: Has voided   Patient Report - Initial Complaint: weakness, left hip pain. Focused Assessment: left hand bruised, pain left hip, hx of UTI on cipro  Tests Performed: hip xray, labs, . Abnormal Results: see epic, glucose 14 wbc 15.4, xray normal.   Treatments provided: ns bolus 250ml, norco 1 tab  Family Comments: son is on his way  OBS brochure/video discussed/provided to patient:  Yes    ED Medications:   Medications   lidocaine 1 % 1 mL (not administered)   lidocaine (LMX4) kit (not administered)   sodium chloride (PF) 0.9% PF flush 3 mL (not administered)   sodium chloride (PF) 0.9% PF flush 3 mL (not administered)   0.9% sodium chloride BOLUS (0 mLs Intravenous Stopped 6/19/17 1441)     Followed by   0.9% sodium chloride infusion (not administered)   HYDROcodone-acetaminophen (NORCO) 5-325 MG per tablet 1 tablet (1 tablet Oral Given 6/19/17 9354)     Drips infusing:  Yes         ED Nurse Name/Phone Number: Aleksandra Quinones,   4:49 PM    RECEIVING UNIT ED HANDOFF REVIEW    Above ED Nurse Handoff Report  was reviewed: Yes  Reviewed by: Roberta Camarillo on June 19, 2017 at 5:23 PM

## 2017-06-19 NOTE — IP AVS SNAPSHOT
[facility] Observation Department    [address]    [facility] [address]    Phone:  [phone]                                       After Visit Summary   6/19/2017    Daya Ignacio    MRN: [id number]           After Visit Summary Signature Page     I have received my discharge instructions, and my questions have been answered. I have discussed any challenges I see with this plan with the nurse or doctor.    ..........................................................................................................................................  Patient/Patient Representative Signature      ..........................................................................................................................................  Patient Representative Print Name and Relationship to Patient    ..................................................               ................................................  Date                                            Time    ..........................................................................................................................................  Reviewed by Signature/Title    ...................................................              ..............................................  Date                                                            Time

## 2017-06-19 NOTE — ED PROVIDER NOTES
History   Chief Complaint:  Generalized Weakness    HPI   Daya Ignacio is a 86 year old female living in a independent living facility with a history of hyperlipidemia, hypertension and osteoporosis who presents via EMS with generalized weakness. The patient reports she has a UTI and began Cipro on Friday, 2 days ago - she had urine cultures done on June 16 th.  She state she was in the kitchen last night and felt off, she fell and hit her left hip off of the cabinets. She states the hip felt sore initially and she was able to get up in the middle of the night to go to the bathroom, but when she tried to get out of bed this morning she was not able to bear weight on the left leg and fell. She states she was able to get up with assistance from her neighbors. She denies neck pain, chest pain, abdominal pain, shortness of breath. She denies fever, nausea, vomiting of diarrhea. She denies taking any pain medication. Of note, the patient does not know what happened to her left wrist.     Allergies:  Atorvastatin  Penicillins  Sulfa Drugs     Medications:    ciprofloxacin (CIPRO) 250 MG tablet  metoprolol (TOPROL-XL) 100 MG 24 hr tablet  aspirin EC 81 MG EC tablet  isosorbide dinitrate (ISORDIL) 10 MG tablet  atorvastatin (LIPITOR) 10 MG tablet  amLODIPine (NORVASC) 10 MG tablet  gabapentin (NEURONTIN) 300 MG capsule  acetaminophen-codeine (TYLENOL/CODEINE #3) 300-30 MG per tablet  sertraline (ZOLOFT) 50 MG tablet  lisinopril (PRINIVIL/ZESTRIL) 40 MG tablet  cloNIDine (CATAPRES) 0.1 MG tablet  conjugated estrogens (PREMARIN) vaginal cream  ascorbic acid (VITAMIN C) 500 MG tablet  Cholecalciferol (VITAMIN D) 1000 UNIT capsule  fish oil-omega-3 fatty acids 1000 MG capsule    Past Medical History:    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  hypertension  Macular degeneration  NSTEMI  Osteoporosis  Chronic pain  UTI  Anxiety  Hyperlipidemia  Hip fracture    Past Surgical History:    Orthopedic surgery    Family  History:    Alzheimer disease  cardiovascular  Neurologic disorder    Social History:  Marital Status:  Single [1]  Smoking status: never  Alcohol use: no  Arrived to ED via EMS    Review of Systems   Constitutional: Negative for chills and fever.   Respiratory: Negative for cough and shortness of breath.    Cardiovascular: Negative for chest pain.   Gastrointestinal: Negative for abdominal pain, blood in stool, diarrhea, nausea and vomiting.   Genitourinary: Negative for dysuria and hematuria.   Musculoskeletal: Positive for arthralgias (left hip) and back pain (chronic). Negative for neck pain.   Skin: Positive for color change (left wrist).   All other systems reviewed and are negative.    Physical Exam   Patient Vitals for the past 24 hrs:   BP Temp Temp src Heart Rate Resp SpO2   06/19/17 1616 - - - - - 92 %   06/19/17 1615 94/58 - - - - -   06/19/17 1600 172/77 - - - - 95 %   06/19/17 1545 184/88 - - - - 97 %   06/19/17 1530 170/78 - - - - 97 %   06/19/17 1515 158/71 - - - - 92 %   06/19/17 1500 153/70 - - - - -   06/19/17 1445 166/74 - - - - 97 %   06/19/17 1431 - - - - - 92 %   06/19/17 1430 157/75 - - - - 90 %   06/19/17 1415 153/68 - - - - 90 %   06/19/17 1402 - - - - - 95 %   06/19/17 1400 163/71 - - - - -   06/19/17 1315 158/66 - - - - -   06/19/17 1300 161/69 - - - - -   06/19/17 1238 172/81 98.3  F (36.8  C) Oral 55 16 96 %        Physical Exam     Nursing note and vitals reviewed.    Constitutional: Pleasant and well groomed.          HENT:    Mouth/Throat: Oropharynx is without swelling or erythema. Oral mucosa moist.    Eyes: Conjunctivae normal are normal. No scleral icterus.    Neck: Neck supple.   Cardiovascular: Normal rate, regular rhythm and intact distal pulses.    Pulmonary/Chest: Effort normal and breath sounds normal.   Abdominal: Soft.  No distension. There is no tenderness.   Musculoskeletal:  No edema, No calf tenderness. Minimal discomfort with internal and external rotation of left  hip. No pain with AP and lateral compression of the pelvis. Pain with flexion of the left hip. No midline posterior cervical, thoracic or lumbar tenderness. Upper extremity strength intact and symmetric. Lower extremity  Ankle dorsi/planter flexion intact - bilaterally. Right lower extremity  Strength intact, however left is limited by hip pain  Genitourinary:   Neurological:Alert. Coordination normal.   Skin: Skin is warm and dry.  Purple hued ecchymosis to dorsal left had overlying radial hand. NO pain to palpation of hand/wrist.   No boniness to tenderness  Psychiatric: Normal mood and affect.     Emergency Department Course   ECG (13:34:05):  Rate 57 bpm. VA interval 138. QRS duration 82. QT/QTc 444/432. P-R-T axes 43 -31 -7. Sinus bradycardia. Left axis deviation. Septal infarct, age undetermined. Abnormal ECG. Interpreted at 1337 by Delmy Shipman MD.    Imaging:  Radiographic findings were communicated with the patient who voiced understanding of the findings.  XR pelvis and hip left 1 view:  Partially visualized right hip arthroplasty without  evidence for complication. There is a compression screw in the left  femoral neck with proximal femoral plate and screw fixation. No  obvious acute complication. Diffuse osteopenia.  As read by Radiology.    Wrist XR, G/E 3 views, left:  Possible nondisplaced fracture distal radius. Clinical  correlation suggested. If clinically indicated, CT could be performed  for further evaluation.  As read by Radiology.    Laboratory:  CBC: WBC 15.4(H) o/w WNL (HGB 11.7, )   BMP: glucose 140(H) o/w WNL (Creatinine 0.57)  Troponin:<0.015  UA: [place] 5(A0, mucous present(A) o/w WNL    Interventions:  1326 NS 1L IV Bolus  1554 Norco 325 mg oral    Emergency Department Course:  Past medical records, nursing notes, and vitals reviewed.  I performed an exam of the patient and obtained history, as documented above.  IV inserted and blood drawn.  The patient was sent for a  pelvis and hip xray and left wrist xray while in the emergency department, findings above.  ECG obtained - see above for results.   I rechecked the patient. Explained findings to labs and imaging.  Attempted ambulation - could not bear weight.   Findings and plan explained to the Patient who consents to admission.   1647: Discussed the patient with Dr. Hernández who will admit the patient to an obs bed for further monitoring, evaluation, and treatment.     Impression & Plan    Medical Decision Making:  This patient presents with left hip pain after fall and then reported of ongoing concerns about UTI. Differential diagnosis was not limited to hip fracture, contusion, pelvic fracture, UTI, dehydration, less likely acute coronary syndrome, anemia, electrolyte abnormality. Ed evaluation as noted above and remarkable for elevated whit blood cell count. Although symptoms not related to acute coronary syndrome as she did have a recent non-STEMI. I did perform and EKG and troponin which were unremarkable. Xray of her hip was negative for fracture. Repeat urine was negative for UTI and reviewing urine culture was specifically sensitive to the Cipro which she reports she is taking. I did do a peroneal exam and saw no other etiology for her UTI type symptoms. The patient still reporting severe left hip pain and able to minimally get up off the bed - certainly not able to ambulate or bear weight on her hip and for this reason observation will be warranted possibly further imaging to evaluate for a cult fracture. I have spoken with Dr. Hernández who has accepted ongoing care of the patient.    Diagnosis:    ICD-10-CM   1. Hip pain, left M25.552   2. Fall, initial encounter W19.XXXA     Disposition:  Admitted to OBS    Dorothea Andres  6/19/2017   [facility] EMERGENCY DEPARTMENT    Dorothea LYNN, am serving as a scribe at 1:07 PM on 6/19/2017 to document services personally performed by Delmy Shipman,  MD based on my observations and the provider's statements to me.        Delmy Shipman MD  06/20/17 5902

## 2017-06-19 NOTE — IP AVS SNAPSHOT
MRN:[id number]                      After Visit Summary   6/19/2017    Daya Ignacio    MRN: [id number]           Thank you!     Thank you for choosing [facility] for your care. Our goal is always to provide you with excellent care. Hearing back from our patients is one way we can continue to improve our services. Please take a few minutes to complete the written survey that you may receive in the mail after you visit. If you would like to speak to someone directly about your visit please contact Patient Relations at [phone]. Thank you!          Patient Information     Date Of Birth          10/11/1930        About your hospital stay     You were admitted on:  June 19, 2017 You last received care in the:  [facility] Observation Department    You were discharged on:  June 21, 2017        Reason for your hospital stay       You were admitted for hip/leg pain and wrist pain after a fall at home.  You were found to have a right-sided trochanteric fracture on CT scan and were evaluated by Orthopedic surgery.  There is no need for surgical fixation of the fracture at this time.  You will need to use your walker at home and will be followed by home therapies and home RN at time of d/c.                  Who to Call     For medical emergencies, please call 911.  For non-urgent questions about your medical care, please call your primary care provider or clinic, [phone]          Attending Provider     Provider Specialty    Delmy Shipman MD Emergency Medicine    [facility]    Brittnee Petersen DO Internal Medicine       Primary Care Provider Office Phone # Fax #    Marcy Natalia Soares PA-C [phone]      After Care Instructions     Activity       Your activity upon discharge: activity as tolerated with walker.  No driving.            Diet       Follow this diet upon discharge: resume cardiac diet at  time of d/c.                  Follow-up Appointments     Follow-up and recommended labs and tests        F/u with PCP for hospital f/u in 1-2 wks.  F/U with Dr. Coleman ( orthopedics) in 2 wks for hospital f/u of right-sided trochanteric fracture.                  Additional Services     Home Care OT Referral for Hospital Discharge       OT to eval and treat    Your provider has ordered home care - occupational therapy. If you have not been contacted within 2 days of your discharge please call the department phone number listed on the top of this document.            Home Care PT Referral for Hospital Discharge       PT to eval and treat    Your provider has ordered home care - physical therapy. If you have not been contacted within 2 days of your discharge please call the department phone number listed on the top of this document.            Home Care Social Service Referral for Hospital Discharge        to assist in home safety eval with home RN and therapies    Your provider has ordered home care - . If you have not been contacted within 2 days of your discharge please call the department phone number listed on the top of this document.            Home care nursing referral       RN skilled nursing visit. RN to assess vital signs and weight, patients ability to take and record daily blood pressure, temp and weight and home safety.    Your provider has ordered home care nursing services. If you have not been contacted within 2 days of your discharge please call the inpatient department phone number at [phone] .                             Pending Results     No orders found from 6/17/2017 to 6/20/2017.            Statement of Approval     Ordered          06/21/17 0944  I have reviewed and agree with all the recommendations and orders detailed in this document.  EFFECTIVE NOW     Approved and electronically signed by:  Brittnee Petersen, DO             Admission  "Information     Date & Time Provider Department Dept. Phone    2017 Brittnee Petersen DO [facility] Observation Department [phone]      Your Vitals Were     Blood Pressure Pulse Temperature Respirations Pulse Oximetry       125/44 (BP Location: Right arm) 63 97.3  F (36.3  C) (Oral) 17 94%       MyChart Information     MyChart lets you send messages to your doctor, view your test results, renew your prescriptions, schedule appointments and more. To sign up, go to [email or web address] . Click on \"Log in\" on the left side of the screen, which will take you to the Welcome page. Then click on \"Sign up Now\" on the right side of the page.     You will be asked to enter the access code listed below, as well as some personal information. Please follow the directions to create your username and password.     Your access code is: [email or web address]  Expires: 2017 10:49 AM     Your access code will  in 90 days. If you need help or a new code, please call your [place] [facility] clinic or [phone].        Care EveryWhere ID     This is your Care EveryWhere ID. This could be used by other organizations to access your [facility] medical records  [id number]        Equal Access to Services     MARIZOL AMOR AH: Aniket schroeder Sonaomi, waaxda luqadaha, qaybta kaalmada adeegyada, kinga lambert. So [facility] [phone].    ATENCIÓN: Si habla español, tiene a cohen disposición servicios gratuitos de asistencia lingüística. Violette al [phone].    We comply with applicable federal civil rights laws and [place] laws. We do not discriminate on the basis of race, color, national origin, age, disability sex, sexual orientation or gender identity.               Review of your medicines      START taking        Dose / Directions    estradiol 0.1 MG/GM cream   Commonly known as:  ESTRACE   Used for:  Symptomatic menopausal or female climacteric states        Dose:  2 g   Place 2 g " vaginally three times a week paraben-free   Quantity:  6 g   Refills:  1       HYDROcodone-acetaminophen 5-325 MG per tablet   Commonly known as:  NORCO   Used for:  Hip pain, left        Dose:  1 tablet   Take 1 tablet by mouth every 4 hours as needed for moderate to severe pain   Quantity:  20 tablet   Refills:  0       hydrOXYzine 25 MG tablet   Commonly known as:  ATARAX   Used for:  Hip pain, left        Dose:  25 mg   Take 1 tablet (25 mg) by mouth every 4 hours as needed (pain)   Quantity:  20 tablet   Refills:  0       nystatin 199350 UNIT/ML suspension   Commonly known as:  MYCOSTATIN   Used for:  Oral thrush        Dose:  982148 Units   Swish and spit 5 mLs (500,000 Units) in mouth 4 times daily for 9 days   Quantity:  180 mL   Refills:  0         CONTINUE these medicines which have NOT CHANGED        Dose / Directions    amLODIPine 10 MG tablet   Commonly known as:  NORVASC   Used for:  Hypertension goal BP (blood pressure) < 140/90        Dose:  10 mg   Take 1 tablet (10 mg) by mouth daily   Quantity:  30 tablet   Refills:  3       aspirin 81 MG EC tablet   Used for:  Non-STEMI (non-ST elevated myocardial infarction) (H)        Dose:  81 mg   Take 1 tablet (81 mg) by mouth daily   Quantity:  30 tablet   Refills:  3       ciprofloxacin 250 MG tablet   Commonly known as:  CIPRO   Used for:  Urinary tract infection without hematuria, site unspecified        Dose:  250 mg   Take 1 tablet (250 mg) by mouth 2 times daily   Quantity:  14 tablet   Refills:  0       cloNIDine 0.1 MG tablet   Commonly known as:  CATAPRES   Used for:  Essential hypertension        Dose:  0.1 mg   Take 1 tablet (0.1 mg) by mouth 2 times daily   Quantity:  180 tablet   Refills:  2       cyanocobalamin 1000 MCG tablet   Commonly known as:  vitamin  B-12        Dose:  1000 mcg   Take 1,000 mcg by mouth every morning   Refills:  0       gabapentin 300 MG capsule   Commonly known as:  NEURONTIN   Used for:  Neuropathy (H)        TAKE ONE  CAPSULE BY MOUTH THREE TIMES A DAY   Quantity:  180 capsule   Refills:  1       isosorbide dinitrate 10 MG tablet   Commonly known as:  ISORDIL   Used for:  Non-STEMI (non-ST elevated myocardial infarction) (H)        Dose:  10 mg   Take 1 tablet (10 mg) by mouth 3 times daily   Quantity:  90 tablet   Refills:  3       lisinopril 40 MG tablet   Commonly known as:  PRINIVIL/ZESTRIL   Used for:  Essential hypertension        Dose:  40 mg   Take 1 tablet (40 mg) by mouth daily   Quantity:  90 tablet   Refills:  1       metoprolol 100 MG 24 hr tablet   Commonly known as:  TOPROL-XL   Used for:  HTN (hypertension), benign        TAKE ONE TABLET BY MOUTH DAILY   Quantity:  90 tablet   Refills:  1       PRESERVISION AREDS Caps        Dose:  1 capsule   Take 1 capsule by mouth 2 times daily   Refills:  0       sertraline 50 MG tablet   Commonly known as:  ZOLOFT   Used for:  Generalized anxiety disorder        Dose:  50 mg   Take 1 tablet (50 mg) by mouth daily   Quantity:  90 tablet   Refills:  1       vitamin D3 2000 UNITS Caps        Dose:  2000 Units   Take 2,000 Units by mouth daily (with dinner)   Refills:  0         STOP taking     acetaminophen-codeine 300-30 MG per tablet   Commonly known as:  TYLENOL/codeine #3           conjugated estrogens cream   Commonly known as:  PREMARIN                Where to get your medicines      These medications were sent to [place], [place] - [address]  [address] [facility] [address]     Phone:  [phone]     estradiol 0.1 MG/GM cream         These medications were sent to [place], [place] - [id number] [facility] [address]     Phone:  [phone]     hydrOXYzine 25 MG tablet    nystatin 292259 UNIT/ML suspension         Some of these will need a paper prescription and others can be bought over the counter. Ask your nurse if you have questions.     Bring a paper  prescription for each of these medications     HYDROcodone-acetaminophen 5-325 MG per tablet       You don't need a prescription for these medications     ciprofloxacin 250 MG tablet                Protect others around you: Learn how to safely use, store and throw away your medicines at www.disposemymeds.org.             Medication List: This is a list of all your medications and when to take them. Check marks below indicate your daily home schedule. Keep this list as a reference.      Medications           Morning Afternoon Evening Bedtime As Needed    amLODIPine 10 MG tablet   Commonly known as:  NORVASC   Take 1 tablet (10 mg) by mouth daily   Last time this was given:  10 mg on 6/21/2017  8:06 AM                                aspirin 81 MG EC tablet   Take 1 tablet (81 mg) by mouth daily   Last time this was given:  81 mg on 6/21/2017  8:09 AM                                ciprofloxacin 250 MG tablet   Commonly known as:  CIPRO   Take 1 tablet (250 mg) by mouth 2 times daily   Last time this was given:  250 mg on 6/21/2017  8:05 AM                                cloNIDine 0.1 MG tablet   Commonly known as:  CATAPRES   Take 1 tablet (0.1 mg) by mouth 2 times daily   Last time this was given:  0.1 mg on 6/21/2017  8:06 AM                                cyanocobalamin 1000 MCG tablet   Commonly known as:  vitamin  B-12   Take 1,000 mcg by mouth every morning                                estradiol 0.1 MG/GM cream   Commonly known as:  ESTRACE   Place 2 g vaginally three times a week paraben-free                                gabapentin 300 MG capsule   Commonly known as:  NEURONTIN   TAKE ONE CAPSULE BY MOUTH THREE TIMES A DAY   Last time this was given:  300 mg on 6/21/2017  8:08 AM                                HYDROcodone-acetaminophen 5-325 MG per tablet   Commonly known as:  NORCO   Take 1 tablet by mouth every 4 hours as needed for moderate to severe pain   Last time this was given:  2 tablets on  6/21/2017  8:04 AM                                hydrOXYzine 25 MG tablet   Commonly known as:  ATARAX   Take 1 tablet (25 mg) by mouth every 4 hours as needed (pain)   Last time this was given:  25 mg on 6/21/2017  8:04 AM                                isosorbide dinitrate 10 MG tablet   Commonly known as:  ISORDIL   Take 1 tablet (10 mg) by mouth 3 times daily   Last time this was given:  10 mg on 6/21/2017  8:05 AM                                lisinopril 40 MG tablet   Commonly known as:  PRINIVIL/ZESTRIL   Take 1 tablet (40 mg) by mouth daily   Last time this was given:  40 mg on 6/21/2017  8:09 AM                                metoprolol 100 MG 24 hr tablet   Commonly known as:  TOPROL-XL   TAKE ONE TABLET BY MOUTH DAILY   Last time this was given:  100 mg on 6/21/2017  8:08 AM                                nystatin [id number] UNIT/ML suspension   Commonly known as:  MYCOSTATIN   Swish and spit 5 mLs (500,000 Units) in mouth 4 times daily for 9 days   Last time this was given:  500,000 Units on 6/21/2017  8:09 AM                                PRESERVISION AREDS Caps   Take 1 capsule by mouth 2 times daily                                sertraline 50 MG tablet   Commonly known as:  ZOLOFT   Take 1 tablet (50 mg) by mouth daily   Last time this was given:  50 mg on 6/21/2017  8:06 AM                                vitamin D3 2000 UNITS Caps   Take 2,000 Units by mouth daily (with dinner)

## 2017-06-19 NOTE — H&P
CHIEF COMPLAINT:  Left hip pain and left wrist pain after falling.      HISTORY OF PRESENT ILLNESS:  Ms. Daya Ignacio is an 86-year-old female with a recent medical history of non-ST elevation myocardial infarction within the past 30 days.  She was actually admitted 06/01/2017.  At that time, she underwent a coronary angiogram and was found to have mild to moderate stenosis of the coronary arteries.  She had an LAD of 50% stenosis and a diagonal branch of 50% stenosis.  She was recommended medical management and there were no interventions done.  At that time, she was also diagnosed with urinary tract infection and was treated with Rocephin.  She was discharged to home.      She was recently discharged with a recurrent urinary tract infection several days ago and started on ciprofloxacin.  She came to the hospital today for concerns about left hip and wrist pain.  The patient was in her kitchen last night and apparently fell onto some cabinets.  She was able to mobilize and get to bed last night, but this morning, her left leg gave out when she tried to mobilize and she fell to the floor.  She fell on her left hip and outstretched wrist.  Her symptoms prompted her to come to the hospital for evaluation.      On arrival in the ER, vital signs included a blood pressure of 170/80 with a heart rate of 55.  Afebrile, saturation 96% on room air.  Workup in the ER included basic labs and imaging studies.  White cell count is 15,000.  The rest of her labs were unremarkable including a troponin, enzyme and urinalysis.  X-rays included wrist x-ray showing a possible nondisplaced fracture of the distal radius of the left wrist.  Pelvic x-ray showed no evidence of fracture.  Dr. Shipman is requesting admission of the patient was admitted to the hospital because she was unable to ambulate in the ER and is having pain.      I spoke with Dr. Shipman of the ER on admission of this patient in the hospital.      PAST MEDICAL HISTORY:    1.  Non-ST elevation myocardial infarction with admission 3 weeks ago.  During that admission, she had a cardiac catheterization done showing 50% stenoses of the LAD and diagonal branch.  No intervention done and medical management recommended.   2.  Echocardiogram during admission 3 weeks ago showed normal left ventricular function.   3.  History of recurrent urinary tract infections in the past; however, since being prescribed a vaginal cream which she says she was prescribed for dryness, has not had any recurrent UTIs until 2 weeks ago.  She has not been taking her cream since then and had a recurrent UTI 2 days ago.   4.  Pain symptoms of extremities which she attributes to a statin medication she was recently prescribed after her non-ST elevation myocardial infarction.  She has not been taking the medication because of this reason.   5.  Hypertension history.   6.  Chronic thrombocytopenia.      CURRENT MEDICATIONS:   1.  Tylenol with codeine.   2.  Amlodipine.   3.  Aspirin.   4.  Lipitor.   5.  Vitamin D.   6.  Ciprofloxacin 250 mg twice a day for 7 days, recently prescribed for UTI 2 days ago.   7.  Clonidine.   8.  Conjugated estrogen vaginal cream.  Has not been using for the past 2 weeks.   9.  Fish oil.   10.  Gabapentin.   11.  Imdur.   12.  Lisinopril.   13.  Metoprolol.   14.  Zoloft.      ALLERGIES:   1.  Atorvastatin, which causes muscle pain.   2.  Macrobid causes body aches.   3.  Penicillin.   4.  Sulfa.      FAMILY HISTORY:  Reviewed.  Nothing contributory to this admission.      SOCIAL HISTORY:  The patient denies smoking or drinking.      REVIEW OF SYSTEMS:  Please see HPI for details.  Comprehensive 10-point review of systems otherwise negative besides that detailed above of history of present illness.      PHYSICAL EXAMINATION:   VITAL SIGNS:  Blood pressure is 150/70 with a heart rate of 60.  Afebrile, saturation 92% on room air.   GENERAL:  The patient appears nontoxic, in no acute  distress.  She appears awake, alert and oriented x3.  She is talkative and interactive.  Good historian.   HEENT:  Head is atraumatic.  Sclerae are white.  Eyelids are normal.  Conjunctivae are normal.  Extraocular muscles are intact.   NECK:  Supple.  No cervical or supraclavicular lymphadenopathy.   HEART:  Regular rate and rhythm.  No significant murmurs.  No lower extremity edema.   LUNGS:  Clear to auscultation bilaterally.  No intercostal retractions.  No conversational dyspnea.   ABDOMEN:  Nontender, nondistended.  Soft.  No masses.  No organomegaly.   EXTREMITIES:  Show no edema.   SKIN:  Reveals no rash or jaundice.  Skin is dry to touch.   NEUROLOGIC:  Cranial nerves II-XII are intact.  Moves all extremities appropriately.  Sensation intact to light touch in the upper and lower extremities bilaterally.   PSYCHIATRIC:  The patient is awake, alert and oriented x3.  Mood and affect are normal and appropriate.   MUSCULOSKELETAL:  Moving the patient's left wrist in all directions elicits no tenderness.  Moving the patient's left hip in all directions elicits no tenderness.  Both were passive range of motion.  The patient can also do active range of motion of both left hip and wrist joints without significant pain.  She does have some bruising about the left wrist joint.      IMPRESSION:  Ms. Ignacio is an 86-year-old female with a history of coronary artery disease and recent non-ST elevation myocardial infarction, hypertension, hyperlipidemia who presented to the hospital today for concerns about weakness and fall.  She injured her left wrist and left hip, which prompted her appearance here in the Emergency Room.  She was initially diagnosed with a urinary tract infection 2 weeks ago, has recurrent urinary tract infection 2 days ago, prescribed ciprofloxacin.  Urinalysis today shows no evidence of urinary tract infection.   1.  Fall, generalized weakness.   2.  Left wrist pain, question distal radial fracture  on imaging.  Clinically does not appear to have fracture as she has no significant tenderness or pain moving wrist about in all directions.  More likely sprain, although unable to completely rule out fracture.   3.  Left hip pain, lateral portion of her hip.  I suspect bursitis or muscle strain related to fall.  No evidence of hip fracture.  This had been surgically operated on in the past.   4.  Recent non-ST elevation myocardial infarction.  Intolerant of atorvastatin, has not been taking.   5.  Recent diagnosis of urinary tract infection, appears to be responding to ciprofloxacin, as well as urinalysis shows no evidence of urinary tract infection today.  The patient is wondering why she is having recurrent urinary tract infection and advised her to resume her usual estrogen vaginal cream that she has not been taking recently.      PLAN:   1.  Pain control.   2.  Observation admission.   3.  Physical therapy consultation.   4.  Continue ciprofloxacin recently prescribed for UTI.   5.  Will try Medrol Dosepak as I suspect she likely has a bursitis of the left hip.   6.  Splint of the left wrist, can be used as needed in the setting of a questionable distal radius fracture.  This does not appear to need any orthopedic or surgical intervention.         KIMBERLY PORTILLO MD             D: 2017 18:01   T: 2017 18:46   MT: TS      Name:     MAGALY MCDANIELS   MRN:      [id number]        Account:      [id number]   :      10/11/1930           Admitted:     [id number]      Document: [id number]

## 2017-06-20 ENCOUNTER — APPOINTMENT (OUTPATIENT)
Dept: CT IMAGING | Facility: CLINIC | Age: 82
DRG: 393 | End: 2017-06-20
Attending: INTERNAL MEDICINE
Payer: MEDICARE

## 2017-06-20 ENCOUNTER — APPOINTMENT (OUTPATIENT)
Dept: PHYSICAL THERAPY | Facility: CLINIC | Age: 82
DRG: 393 | End: 2017-06-20
Attending: INTERNAL MEDICINE
Payer: MEDICARE

## 2017-06-20 PROBLEM — S72.102A: Status: ACTIVE | Noted: 2017-06-20

## 2017-06-20 LAB
ALBUMIN SERPL-MCNC: 3.5 G/DL (ref 3.4–5)
ALP SERPL-CCNC: 78 U/L (ref 40–150)
ALT SERPL W P-5'-P-CCNC: 19 U/L (ref 0–50)
ANION GAP SERPL CALCULATED.3IONS-SCNC: 4 MMOL/L (ref 3–14)
AST SERPL W P-5'-P-CCNC: 11 U/L (ref 0–45)
BASOPHILS # BLD AUTO: 0 10E9/L (ref 0–0.2)
BASOPHILS NFR BLD AUTO: 0.1 %
BILIRUB SERPL-MCNC: 1.5 MG/DL (ref 0.2–1.3)
BUN SERPL-MCNC: 20 MG/DL (ref 7–30)
CALCIUM SERPL-MCNC: 8.8 MG/DL (ref 8.5–10.1)
CHLORIDE SERPL-SCNC: 105 MMOL/L (ref 94–109)
CK SERPL-CCNC: 33 U/L (ref 30–225)
CO2 SERPL-SCNC: 28 MMOL/L (ref 20–32)
CREAT SERPL-MCNC: 0.49 MG/DL (ref 0.52–1.04)
DIFFERENTIAL METHOD BLD: ABNORMAL
EOSINOPHIL # BLD AUTO: 0 10E9/L (ref 0–0.7)
EOSINOPHIL NFR BLD AUTO: 0 %
ERYTHROCYTE [DISTWIDTH] IN BLOOD BY AUTOMATED COUNT: 14.9 % (ref 10–15)
GFR SERPL CREATININE-BSD FRML MDRD: ABNORMAL ML/MIN/1.7M2
GLUCOSE SERPL-MCNC: 130 MG/DL (ref 70–99)
HCT VFR BLD AUTO: 32.1 % (ref 35–47)
HGB BLD-MCNC: 10.7 G/DL (ref 11.7–15.7)
IMM GRANULOCYTES # BLD: 0.1 10E9/L (ref 0–0.4)
IMM GRANULOCYTES NFR BLD: 0.7 %
INR PPP: 1.18 (ref 0.86–1.14)
INTERPRETATION ECG - MUSE: NORMAL
LYMPHOCYTES # BLD AUTO: 0.8 10E9/L (ref 0.8–5.3)
LYMPHOCYTES NFR BLD AUTO: 6.5 %
MCH RBC QN AUTO: 31 PG (ref 26.5–33)
MCHC RBC AUTO-ENTMCNC: 33.3 G/DL (ref 31.5–36.5)
MCV RBC AUTO: 93 FL (ref 78–100)
MONOCYTES # BLD AUTO: 0.3 10E9/L (ref 0–1.3)
MONOCYTES NFR BLD AUTO: 2.3 %
NEUTROPHILS # BLD AUTO: 10.6 10E9/L (ref 1.6–8.3)
NEUTROPHILS NFR BLD AUTO: 90.4 %
NRBC # BLD AUTO: 0 10*3/UL
NRBC BLD AUTO-RTO: 0 /100
PLATELET # BLD AUTO: 142 10E9/L (ref 150–450)
POTASSIUM SERPL-SCNC: 4 MMOL/L (ref 3.4–5.3)
PROT SERPL-MCNC: 6.9 G/DL (ref 6.8–8.8)
RBC # BLD AUTO: 3.45 10E12/L (ref 3.8–5.2)
SODIUM SERPL-SCNC: 137 MMOL/L (ref 133–144)
WBC # BLD AUTO: 11.7 10E9/L (ref 4–11)

## 2017-06-20 PROCEDURE — 99225 ZZC SUBSEQUENT OBSERVATION CARE,LEVEL II: CPT | Performed by: INTERNAL MEDICINE

## 2017-06-20 RX ORDER — HYDROCODONE BITARTRATE AND ACETAMINOPHEN 5; 325 MG/1; MG/1
1-2 TABLET ORAL EVERY 4 HOURS PRN
Status: DISCONTINUED | OUTPATIENT
Start: 2017-06-20 | End: 2017-06-21

## 2017-06-20 RX ORDER — HYDROXYZINE HYDROCHLORIDE 25 MG/1
25-50 TABLET, FILM COATED ORAL EVERY 4 HOURS PRN
Status: DISCONTINUED | OUTPATIENT
Start: 2017-06-20 | End: 2017-06-21

## 2017-06-20 RX ORDER — METHYLPREDNISOLONE 4 MG/1
4 TABLET ORAL AT BEDTIME
Status: DISCONTINUED | OUTPATIENT
Start: 2017-06-22 | End: 2017-06-20

## 2017-06-20 RX ORDER — HYDROCODONE BITARTRATE AND ACETAMINOPHEN 5; 325 MG/1; MG/1
1 TABLET ORAL EVERY 4 HOURS PRN
Status: DISCONTINUED | OUTPATIENT
Start: 2017-06-20 | End: 2017-06-20

## 2017-06-20 RX ORDER — OXYCODONE HYDROCHLORIDE 5 MG/1
5-10 TABLET ORAL
Status: DISCONTINUED | OUTPATIENT
Start: 2017-06-20 | End: 2017-06-21 | Stop reason: HOSPADM

## 2017-06-20 RX ORDER — NYSTATIN 100000/ML
500000 SUSPENSION, ORAL (FINAL DOSE FORM) ORAL 4 TIMES DAILY
Status: DISCONTINUED | OUTPATIENT
Start: 2017-06-20 | End: 2017-06-21 | Stop reason: HOSPADM

## 2017-06-20 RX ORDER — METHYLPREDNISOLONE 4 MG/1
4 TABLET ORAL ONCE
Status: DISCONTINUED | OUTPATIENT
Start: 2017-06-20 | End: 2017-06-20

## 2017-06-20 RX ORDER — METHYLPREDNISOLONE 4 MG/1
8 TABLET ORAL ONCE
Status: COMPLETED | OUTPATIENT
Start: 2017-06-20 | End: 2017-06-20

## 2017-06-20 RX ORDER — METHYLPREDNISOLONE 4 MG/1
4 TABLET ORAL
Status: DISCONTINUED | OUTPATIENT
Start: 2017-06-21 | End: 2017-06-20

## 2017-06-20 RX ORDER — METHYLPREDNISOLONE 4 MG/1
8 TABLET ORAL AT BEDTIME
Status: DISCONTINUED | OUTPATIENT
Start: 2017-06-20 | End: 2017-06-20

## 2017-06-20 RX ADMIN — ISOSORBIDE DINITRATE 10 MG: 10 TABLET ORAL at 09:02

## 2017-06-20 RX ADMIN — HYDROCODONE BITARTRATE AND ACETAMINOPHEN 2 TABLET: 5; 325 TABLET ORAL at 17:59

## 2017-06-20 RX ADMIN — CLONIDINE HYDROCHLORIDE 0.1 MG: 0.1 TABLET ORAL at 09:01

## 2017-06-20 RX ADMIN — HYDROCODONE BITARTRATE AND ACETAMINOPHEN 2 TABLET: 5; 325 TABLET ORAL at 00:17

## 2017-06-20 RX ADMIN — HYDROCODONE BITARTRATE AND ACETAMINOPHEN 2 TABLET: 5; 325 TABLET ORAL at 08:59

## 2017-06-20 RX ADMIN — LISINOPRIL 40 MG: 20 TABLET ORAL at 09:00

## 2017-06-20 RX ADMIN — AMLODIPINE BESYLATE 10 MG: 5 TABLET ORAL at 09:01

## 2017-06-20 RX ADMIN — CIPROFLOXACIN HYDROCHLORIDE 250 MG: 250 TABLET, FILM COATED ORAL at 09:01

## 2017-06-20 RX ADMIN — GABAPENTIN 300 MG: 300 CAPSULE ORAL at 13:49

## 2017-06-20 RX ADMIN — GABAPENTIN 300 MG: 300 CAPSULE ORAL at 19:54

## 2017-06-20 RX ADMIN — NYSTATIN 500000 UNITS: 100000 SUSPENSION ORAL at 19:54

## 2017-06-20 RX ADMIN — METHYLPREDNISOLONE 8 MG: 4 TABLET ORAL at 04:36

## 2017-06-20 RX ADMIN — HYDROXYZINE HYDROCHLORIDE 25 MG: 25 TABLET ORAL at 16:51

## 2017-06-20 RX ADMIN — NYSTATIN 500000 UNITS: 100000 SUSPENSION ORAL at 16:51

## 2017-06-20 RX ADMIN — CLONIDINE HYDROCHLORIDE 0.1 MG: 0.1 TABLET ORAL at 19:55

## 2017-06-20 RX ADMIN — CIPROFLOXACIN HYDROCHLORIDE 250 MG: 250 TABLET, FILM COATED ORAL at 19:54

## 2017-06-20 RX ADMIN — ISOSORBIDE DINITRATE 10 MG: 10 TABLET ORAL at 19:55

## 2017-06-20 RX ADMIN — ISOSORBIDE DINITRATE 10 MG: 10 TABLET ORAL at 16:17

## 2017-06-20 RX ADMIN — NYSTATIN 500000 UNITS: 100000 SUSPENSION ORAL at 13:49

## 2017-06-20 RX ADMIN — SERTRALINE HYDROCHLORIDE 50 MG: 50 TABLET ORAL at 09:01

## 2017-06-20 RX ADMIN — HYDROCODONE BITARTRATE AND ACETAMINOPHEN 1 TABLET: 5; 325 TABLET ORAL at 13:49

## 2017-06-20 RX ADMIN — METOPROLOL SUCCINATE 100 MG: 25 TABLET, EXTENDED RELEASE ORAL at 09:00

## 2017-06-20 RX ADMIN — GABAPENTIN 300 MG: 300 CAPSULE ORAL at 09:01

## 2017-06-20 RX ADMIN — METHYLPREDNISOLONE 8 MG: 4 TABLET ORAL at 08:59

## 2017-06-20 NOTE — CONSULTS
Care Transition Initial Assessment -   Reason For Consult: discharge planning  Met with: Patient       Active Problems:    Hip pain    Trochanteric fracture of left femur (H)         DATA  Lives With: alone  Living Arrangements: independent living facility  Description of Support System: Other (see comments):  Pt has a son but she has asked that hospital not contact him       Quality Of Family Relationships: unable to assess  Transportation Available: car, family or friend will provide, van, wheelchair accessible      ASSESSMENT  Cognitive Status:   Concerns to be addressed:  Met with pt.  She requests that son not be contacted as of 1400 on  Tuesday 6/20.  Pt qualifies for [facility] but pt declined [facility] earlier in day.  She also has a 3 day stay in late May, but [facility]2 admission not relating to current OBS admission.  [facility] was patient's only choice for TCU.  Pt specifically does not want to go to [facility] or [facility].  As of 1400, the plan is for pt to return home on Wednesday with [facility].  A referral has been made.  Multiple visits with pt today and each time her responses are a little different.  She has been cleared by PT for going home so not sure if TCU would accept her at all.  PLAN  Financial costs for the patient includes None for [facility] or home with home care..  Patient given options and choices for discharge Multiple choices .  Patient/family is agreeable to the plan?As of 1430, the plan is to return home on Wednesday with home care   Patient anticipates discharging to:  Home with home care.  Discharge Planner   Discharge Plans in progress: home with home care  Barriers to discharge plan: Pt changes mind  Follow up plan: [facility] to follow-up on Wednesday.       Entered by: Zeenat Ross 06/20/2017 1:32 PM

## 2017-06-20 NOTE — CONSULTS
CHIEF COMPLAINT:  Left hip and left wrist pain.      HISTORY OF PRESENT ILLNESS:  Magaly Mcdaniels is an 86-year-old woman who fell yesterday and sustained injury to her left hip and left wrist.  X-rays and evaluation have noted that there was no fracture in her wrist by x-ray or CT and her hip scan shows a greater trochanteric fracture, nondisplaced.      PHYSICAL EXAMINATION:  The patient is alert and oriented and comfortable in bed.  Examination of her left wrist reveals some redness and tenderness but she moves it normally with no particular pain.      Examination her left hip reveals tenderness over the greater trochanter.  She has reasonable range of motion with minimal pain.      X-rays as described above.  X-rays of her left wrist are negative.      X-rays of her left hip show a nondisplaced greater trochanteric fracture around and a screw and sideplate from a 10-year-old intratrochanteric fracture fixation.      ASSESSMENT:   1.  Nondisplaced left greater trochanteric hip fracture.   2.  Left wrist pain likely contusion or nonspecific sprain.      PLAN:  I had a long discussion with the patient with her wrist, this could be simply ignored.  With regard to her hip the fracture had been treated nonsurgically.  She can weightbear as tolerated.  She has a walker and she certainly does not require any surgery and there was no real bracing or fixation that would help.  I told it might be painful, but in 4 months or so it should heal without any difficulties.  She may need rehab placement in the meantime.  Otherwise, she can certainly be discharged to whatever care facility is appropriate for her and I would see her back in the office in 2 weeks.         ACOSTA SAHA MD             D: 2017 12:59   T: 2017 13:08   MT: [id number]      Name:     MAGALY MCDANIELS   MRN:      [id number]        Account:       [id number]   :      10/11/1930           Consult Date:  2017      Document: [id number]        cc: Dennis Coleman MD

## 2017-06-20 NOTE — PHARMACY-ADMISSION MEDICATION HISTORY
Admission medication history interview status for this patient is complete. See [facility] admission navigator for allergy information, prior to admission medications and immunization status.     Medication history interview source(s):Patient and Family  Medication history resources (including written lists, pill bottles, clinic record):Rx bottles    Changes made to PTA medication list:  Added: Vit b12, Preservision  Deleted: Vit C, Fish oil, Lipitor  Changed: Vit D    Medication reconciliation/reorder completed by provider prior to medication history? Yes    For patients on insulin therapy: No    Prior to Admission medications    Medication Sig Last Dose Taking? Auth Provider   Cholecalciferol (VITAMIN D3) 2000 UNITS CAPS Take 2,000 Units by mouth daily (with dinner) 6/18/2017 at Unknown time Yes Unknown, Entered By History   Multiple Vitamins-Minerals (PRESERVISION AREDS) CAPS Take 1 capsule by mouth 2 times daily 6/19/2017 at am Yes Unknown, Entered By History   cyanocobalamin (VITAMIN  B-12) 1000 MCG tablet Take 1,000 mcg by mouth every morning 6/19/2017 at am Yes Unknown, Entered By History   ciprofloxacin (CIPRO) 250 MG tablet Take 1 tablet (250 mg) by mouth 2 times daily for 7 days 6/19/2017 at am, 7 tabs left Yes Marcy Soares PA-C   metoprolol (TOPROL-XL) 100 MG 24 hr tablet TAKE ONE TABLET BY MOUTH DAILY 6/19/2017 at am Yes Marcy Soares PA-C   aspirin EC 81 MG EC tablet Take 1 tablet (81 mg) by mouth daily 6/19/2017 at am Yes Andre Mcmanus MD   isosorbide dinitrate (ISORDIL) 10 MG tablet Take 1 tablet (10 mg) by mouth 3 times daily 6/19/2017 at am Yes Andre Mcmanus MD   amLODIPine (NORVASC) 10 MG tablet Take 1 tablet (10 mg) by mouth daily 6/19/2017 at am Yes Andre Mcmanus MD   gabapentin (NEURONTIN) 300 MG capsule TAKE ONE CAPSULE BY MOUTH THREE TIMES A DAY 6/19/2017 at am Yes Marcy Soares PA-C   acetaminophen-codeine (TYLENOL/CODEINE #3) 300-30 MG per tablet  Take 1 tablet by mouth every 8 hours as needed for mild pain  Yes Marcy Soares PA-C   sertraline (ZOLOFT) 50 MG tablet Take 1 tablet (50 mg) by mouth daily 6/19/2017 at am Yes Marcy Soares PA-C   lisinopril (PRINIVIL/ZESTRIL) 40 MG tablet Take 1 tablet (40 mg) by mouth daily 6/19/2017 at am Yes Marcy Soares PA-C   cloNIDine (CATAPRES) 0.1 MG tablet Take 1 tablet (0.1 mg) by mouth 2 times daily 6/19/2017 at am Yes Marcy Soares PA-C   conjugated estrogens (PREMARIN) vaginal cream Place 0.5 g vaginally three times a week ran out Yes Juan Manuel Caballero MD

## 2017-06-20 NOTE — PLAN OF CARE
Problem: Discharge Planning  Goal: Discharge Planning (Adult, OB, Behavioral, Peds)  PRIMARY DIAGNOSIS: L Hip Pain  Primary Symptoms: Pain with movement      OUTPATIENT/OBSERVATION GOALS TO BE MET BEFORE DISCHARGE:      1. Orthostatic symptoms (BP decrease or HR increase with patient upright)?  N/A  2. Vital Signs stable? Yes  3. Documented urine output: yes  4. Tolerating PO fluid: Yes  5. Symptoms improved: No  6. Labs WNL?yes  7. ADLs back to baseline?  No  8. Activity and level of assistance: Up with stand by assistance.   9. Pain status: Improved-controlled with oral pain medications.  10. Barriers to discharge noted Yes. Pt lives in an independent senior living      Interpretation of rhythm per telemetry tech: N/A      Pt is alert and oriented. Pt ambulates with stand by assistance with 9/10 hip pain. Norco administered for pain. X-ray of the hip showed no fracture and xray of L arm showed a possible dislocated fracture. Pt concerned about mobility issues when returning home. Will continue to monitor and offer supportive services.

## 2017-06-20 NOTE — UTILIZATION REVIEW
"Admission Status; Secondary Review Determination     Under the authority of the Utilization Management Committee, the utilization review process indicated a secondary review on the above patient.  The review outcome is based on review of the medical records, discussions with staff, and applying clinical experience noted on the date of the review.       (X) Observation Status Appropriate - This patient does not meet hospital inpatient criteria and is placed in observation status. If this patient's primary payer is Medicare and was admitted as an inpatient, Condition Code 44 should be used and patient status changed to \"observation.\"     RATIONALE FOR DETERMINATION     This is a 85 y/o female who suffered a fall, resulting in a non-displaced hip fracture.  She has pre-existing hardware in place.  Ortho has already evaluated the patient and recommend non-operative treatment and weight bearing as tolerated.  Upon discussion with Dr. Petersen, thus far the patient is doing well with PT and may discharge today vs tomorrow pending on placement needs.    The severity of illness, intensity of service provided, expected LOS and risk for adverse outcome make the care appropriate for further observation; however, the patient doesn't meet criteria for hospital inpatient admission. Dr. Petersen was notified of this determination.    The information on this document is developed by the utilization review team in order for the business office to ensure compliance.  This only denotes the appropriateness of proper admission status and does not reflect the quality of care rendered.       The definitions of Inpatient Status and Observation Status used in making the determination above are those provided in the CMS Coverage Manual, Chapter 1 and Chapter 6, section 70.4.      Sincerely,     Santosh Huynh MD  Utilization Review/ Case Management  [facility]   "

## 2017-06-20 NOTE — PLAN OF CARE
Problem: Discharge Planning  Goal: Discharge Planning (Adult, OB, Behavioral, Peds)  PRIMARY DIAGNOSIS: L Hip Pain  Primary Symptoms: Pain with movement      OUTPATIENT/OBSERVATION GOALS TO BE MET BEFORE DISCHARGE:      1. Orthostatic symptoms (BP decrease or HR increase with patient upright)?  N/A  2. Vital Signs stable? Yes  3. Documented urine output: Due to void  4. Tolerating PO fluid: Yes  5. Symptoms improved: No  6. Labs WNL? No. Pt on antibiotics for UTI  7. ADLs back to baseline?  No  8. Activity and level of assistance: Up with maximum assistance. Consider SW and/or PT evaluation.   9. Pain status: Improved-controlled with oral pain medications.  10. Barriers to discharge noted Yes. Pt lives in an independent senior living      Interpretation of rhythm per telemetry tech: N/A      Pt is alert and oriented. Pt unable to ambulate due to L hip pain. Norco administered for pain. X-ray of the hip showed no fracture and xray of L arm showed a possible dislocated fracture. Will continue to monitor and offer supportive services.

## 2017-06-20 NOTE — PLAN OF CARE
Problem: Discharge Planning  Goal: Discharge Planning (Adult, OB, Behavioral, Peds)  Outcome: Improving  PRIMARY DIAGNOSIS: L hip pain  Primary Symptoms: Pain with movement     OUTPATIENT/OBSERVATION GOALS TO BE MET BEFORE DISCHARGE:     1. Orthostatic symptoms (BP decrease or HR increase with patient upright)?  N/A  2. Vital Signs stable? Yes  3. Documented urine output: No  4. Tolerating PO fluid: Yes  5. Symptoms improved: No  6. Labs WNL? No. Pt on antibiotics for UTI  7. ADLs back to baseline?  No  8. Activity and level of assistance: Up with maximum assistance. Consider SW and/or PT evaluation.   9. Pain status: Improved-controlled with oral pain medications.  10. Barriers to discharge noted Yes. Pt lives in an independent senior living     Interpretation of rhythm per telemetry tech: N/A     Pt is alert and oriented. Pt unable to ambulate due to L hip pain. Norco administered for pain. X-ray of the hip showed no fracture and xray of L arm showed a possible dislocated fracture. Will continue to monitor and offer supportive services.

## 2017-06-20 NOTE — PROGRESS NOTES
[facility]  Hospitalist Observation Unit Progress Note  Name: Daya Ignacio    MRN: [id number]  Provider:  Brittnee Petersen DO    Initial presenting complaint/issue to hospital (Diagnosis): fall at home with resulting left hip and left wrist pain         Assessment and Plan:      Summary of Stay: Daya Ignacio is a 86 year old female admitted on 6/19/2017 after fall with left wrist and hip pain.    Problem List:   1.  Left hip pain - s/p traumatic fall  CT of the left hip reveals superimposed acute or subacute greater trochanteric fracture with extension into the anterior cortex of the intertrochanteric region.is noted. She is having a lot of pain.  Will ask for ortho surgery eval.  She does have screws there and past fracture that appears to be healing.    Continue pain control and supportive care.  Await further recommendations by ortho    Was started on medrol dose betzaida for left hip bursitis on admission - will hold, for now.    2.  Left wrist pain  No clear fracture on plain films or clearly on CT scan.  Will ask for ortho to evaluate wrist, as well.  ? Need MRI.  She is moving it fairly well but it is painful.    3.  UTI  UC reviewed from 6/16/17 was E. Coli --pan sensitive.  She was placed on Cipro (day # 4).  Repeat UA this admission has cleared. Will complete a 5-day course  Of note - was recently d/c on 7 day course of cefadroxil     4.  CAD - recent admission for NSTEMI  She was discharged on 6/1/17  At that time medical management was decided as most appropriate treatment for a 50% smooth prox. LAD lesion.  New meds at time of that d/c ----lipitor (which pt has self-stopped), norvasc, Isordil and ASA.  She was to resume her lisinopril and toprol xl as PTA.  She is not having any CP at this time.    5.  Recent Myalgias, resolved  Will add on CK to lab   She has self-stopped her lipitor since recent d/c b/c of this.    6.  Oral thrush  Start nystatin swish and spit    Pt requested that  I not call her sonGael at this time    DVT Prophylaxis:  -  pcd's  Code Status: Full Code  Discharge Dispo: TCU  Estimated Disch Date / # of Days until Discharge: >2 midnights - pending ortho recommendations    Observation unit physician is available until 1400.  After 1400, please contact the observation unit Physician Assistant if questions/concerns.        Interval History:      Having a lot of hip pain with movement in bed and with ambulation today with PT.  Also a lot of left wrist pain; + swelling and bruising s/p her fall.  No CP, SOB, N/V, HA or other new c/o.                  Physical Exam:      Last Vital Signs:  Temp: 96.9  F (36.1  C) Temp src: Oral BP: 162/50   Heart Rate: 61 Resp: 18 SpO2: 96 % O2 Device: None (Room air)    I/O last 3 completed shifts:  In: 100 [P.O.:100]  Out: -     GEN:  Alert, oriented x 3, comfortable, NAD.  HEENT:  Normocephalic/atraumatic, PERRL, no scleral icterus, no nasal discharge, mouth moist, no oral ulcers or thrush noted.  NECK:  No clear thyromegaly of clear JVD  CV:  Regular rate and rhythm, no murmur to ausc.  S1 + S2 noted, no S3 or S4.  LUNGS:  Clear to auscultation bilaterally.  No rales/rhonchi/wheezing auscultated bilaterally.  No costal retractions bilaterally.  Symmetric chest rise on inhalation noted.  ABD:  Active bowel sounds, soft, non-tender/non-distended.  No rebound/guarding/rigidity.  No masses palpated.  No obvious HSM to exam.  EXT:  No edema or cyanosis bilaterally. No joint synovitis noted.  No calf-tenderness or asymmetry noted.  SKIN:  Dry to touch, no rashes or jaundice noted.  PSYCH:  Mood appropriate, Not tearful or depressed.  Maintains direct eye contact.  NEURO:  No tremors at rest       Medications:      All current medications were reviewed.         Data:      All new lab and imaging data was reviewed.   Labs:    Recent Labs  Lab 06/16/17  1328   CULT >100,000 colonies/mL Escherichia coli*       Recent Labs  Lab 06/19/17  1252       POTASSIUM 3.6   CHLORIDE 105   CO2 27   ANIONGAP 5   *   BUN 17   CR 0.57   GFRESTIMATED >90Non  GFR Calc   GFRESTBLACK >90African American GFR Calc   DAVE 9.0       Recent Labs  Lab 06/19/17  1252   WBC 15.4*   HGB 11.7   HCT 36.5   MCV 95          Recent Labs  Lab 06/19/17  1252      POTASSIUM 3.6   CHLORIDE 105   CO2 27   ANIONGAP 5   *   BUN 17   CR 0.57   GFRESTIMATED >90Non  GFR Calc   GFRESTBLACK >90African American GFR Calc   DAVE 9.0     No results for input(s): INR in the last 168 hours.    Recent Labs  Lab 06/19/17  1549 06/16/17  1328   COLOR Yellow Yellow   APPEARANCE Clear Cloudy   URINEGLC Negative Negative   URINEBILI Negative Negative   URINEKETONE 5* Negative   SG 1.010 1.015   UBLD Negative Trace*   URINEPH 7.0 5.5   PROTEIN Negative Negative   UROBILINOGEN  --  0.2   NITRITE Negative Positive*   LEUKEST Negative Large*   RBCU <1 2-5*   WBCU <1 >100*      Recent Imaging:   Recent Results (from the past 24 hour(s))   XR Pelvis and Hip Left 1 View    Narrative    XR PELVIS AND HIP LEFT 1 VIEW  6/19/2017 1:53 PM    HISTORY:  pain, trauma    COMPARISON:  Prior right hip and pelvic images through 11/3/2010.      Impression    IMPRESSION:  Partially visualized right hip arthroplasty without  evidence for complication. There is a compression screw in the left  femoral neck with proximal femoral plate and screw fixation. No  obvious acute complication. Diffuse osteopenia.    MANDI CEBALLOS MD   Wrist XR, G/E 3 views, left    Narrative    XR WRIST LEFT G/E 3 VIEWS    6/19/2017 4:57 PM      HISTORY: pain, trauma    COMPARISON: None.    FINDINGS:  There is normal osseous alignment. There is a very slight  buckle of the medial cortex of the distal radius. This could be due to  a very subtle fracture. Its possible its due to the used epiphyseal  plate. Clinical correlation suggested. There does appear to be soft  tissue swelling over the distal radius  region.         Impression    IMPRESSION: Possible nondisplaced fracture distal radius. Clinical  correlation suggested. If clinically indicated, CT could be performed  for further evaluation.    JANIS HOLDER MD   CT Wrist Left w/o Contrast    Narrative    CT WRIST LEFT WITHOUT CONTRAST June 20, 2017 11:04 AM     HISTORY: Rule out possible fracture seen on x-ray.    COMPARISON: 6/19/2017 radiographs.    TECHNIQUE: Radiation dose for this scan was reduced using automated  exposure control, adjustment of the mA and/or kV according to patient  size, or iterative reconstruction technique.    FINDINGS: Cortical irregularity is noted at the junction of the distal  radial epiphysis/metaphysis along the dorsal/radial cortical margin.  This could be related to an old healed fracture. No intramedullary  fracture line is visible. The distal ulna appears intact. Alignment at  the distal radial ulnar joint appears within normal limits. There is  mild prominence of the scapholunate space which could be related to  normal variation. Scapholunate ligament injury cannot be entirely  excluded. Radiocarpal and midcarpal joint space widths appear within  normal limits. Moderately advanced degenerative arthrosis is noted at  the first carpometacarpal joint. On the distal edge of the scan, there  is a fat-containing lesion, possibly a lipoma distal to the carpal  tunnel.      Impression    IMPRESSION:  1. Distal radial cortical irregularity which may be related to an old  healed fracture. No acute intramedullary fracture line is visible. If  there is continued clinical suspicion for acute distal radial injury,  MRI would be or sensitive for trabecular injury/intramedullary edema.  2. Scapholunate joint space mild prominence. This may be related to  normal variation. However, scapholunate ligament injury is not  entirely excluded. Radiocarpal and midcarpal joint space widths appear  within normal limits.  3. First carpometacarpal joint  moderately advanced degenerative  arthrosis.  4. Fat-containing lesion, likely a lipoma, immediately distal to the  carpal tunnel, not completely evaluated with this scan.   CT Hip Left w/o Contrast    Narrative    CT HIP LEFT WITHOUT CONTRAST June 20, 2017 11:04 AM     HISTORY: Fall and pain.    TECHNIQUE: Radiation dose for this scan was reduced using automated  exposure control, adjustment of the mA and/or kV according to patient  size, or iterative reconstruction technique.    FINDINGS: The bones are diffusely osteopenic. Right total hip  arthroplasty with longstem femoral component is noted. The tip of this  femoral stem extends off the inferior border of the scan. At the left  hip, there is a dynamic compression screw and lateral side plate.  Although the intertrochanteric fracture subtended by the compression  screw appears to be healed, there appears to be an acute or subacute  fracture across the base of the left greater trochanter with extension  into the anterior cortex of the intertrochanteric region. Adjacent  soft tissue edema and possible soft tissue hematoma is noted. There is  deformity of the right superior and inferior pubic rami likely related  to healed fractures. The remainder of the osseous pelvis and sacrum  appears to be grossly intact.      Impression    IMPRESSION:  1. Left hip dynamic compression screw and sideplate subtending what  appears to be a healed intertrochanteric fracture.  2. Left hip superimposed acute or subacute greater trochanteric  fracture with extension into the anterior cortex of the  intertrochanteric region. Adjacent soft tissue edema and/or hemorrhage  is noted.  3. Right superior/inferior pubic rami deformity compatible with old  healed fracture.  4. Right total hip arthroplasty.  5. Diffuse osteopenia.

## 2017-06-20 NOTE — CONSULTS
Full note dictated.    Left greater troch fracture non-displaced and non-specific left wrist pain with x-ray and CT neg for fracture.    Hip can be managed symptomatically only. Wt. Bear as tolerated. F/uj to office 2 weeks.

## 2017-06-20 NOTE — PROGRESS NOTES
" 06/20/17 1000   Quick Adds   Type of Visit Initial PT Evaluation   Living Environment   Lives With alone   Living Arrangements independent living facility   Home Accessibility no concerns   Number of Stairs to Enter Home 0   Number of Stairs Within Home 0   Transportation Available car;family or friend will provide;van, wheelchair accessible   Living Environment Comment Pt lives in senior highrise, independent in all cares, ADLs, drives to appts when needed (doesn't do much because of poor eyesight).   Self-Care   Dominant Hand right   Usual Activity Tolerance good   Current Activity Tolerance moderate   Regular Exercise no   Equipment Currently Used at Home grab bar;walker, rolling   Activity/Exercise/Self-Care Comment Pt reports previously I in all ambulation. Intermittent use of 4WW, but not as of recent.   Functional Level Prior   Ambulation 0-->independent   Transferring 0-->independent   Toileting 0-->independent   Bathing 0-->independent   Dressing 0-->independent   Eating 0-->independent   Communication 0-->understands/communicates without difficulty   Swallowing 0-->swallows foods/liquids without difficulty   Cognition 0 - no cognition issues reported   Fall history within last six months yes   Number of times patient has fallen within last six months 2   Which of the above functional risks had a recent onset or change? ambulation;fall history   Prior Functional Level Comment Previously I in all ADLs, ambulation    General Information   Onset of Illness/Injury or Date of Surgery - Date 06/19/17   Referring Physician Gael Hernández MD   Patient/Family Goals Statement Pt reports being nerous about going home 'because of all this pain.\"   Pertinent History of Current Problem (include personal factors and/or comorbidities that impact the POC) Pt is a 86 year old female living in a independent living facility with a history of hyperlipidemia, hypertension and osteoporosis who presents via EMS with " generalized weakness. Pt fell twice in the last 24 hours and injured L hand and hip.. PMH includes R MARCIA and L fixation 6-10 years ago, fully healed without complications. Xray in ER r/o occult fracture.    Precautions/Limitations fall precautions   Cognitive Status Examination   Orientation orientation to person, place and time   Level of Consciousness alert   Follows Commands and Answers Questions 100% of the time   Personal Safety and Judgment intact   Memory intact   Pain Assessment   Patient Currently in Pain Yes, see Vital Sign flowsheet  (reports 9/10 L hip 1 hour after Norco)   Integumentary/Edema   Integumentary/Edema other (describe)   Integumentary/Edema Comments bruising L hand with edema   Range of Motion (ROM)   ROM Quick Adds No deficits were identified   Strength   Manual Muscle Testing Quick Adds No deficits were identified   Strength Comments Unable to hold resistance into L hip flexion and abduction due to pain inhibition, fear.    Bed Mobility   Bed Mobility Comments Independent from flat bed.    Transfer Skills   Transfer Comments SBA-independentsit to stand, stand to sit with use of 2WW   Gait   Gait Comments Ambulates with full heel to toe gait, good safe pace, reports 9/10 L hip pain with heel strike, no worse as a result once sitting at EOB. Ambulates 150' with SBAx1 and 2WW   Balance   Balance Comments No perturbations or sway noted.    Clinical Impression   Criteria for Skilled Therapeutic Intervention evaluation only   PT Diagnosis Increased pain from baseline s/p L fall without occult injury.    Influenced by the following impairments Increased pain   Functional limitations due to impairments Decreased ease with ambulation and requiring 2-4WW   Clinical Presentation Stable/Uncomplicated   Clinical Presentation Rationale Pt near functional baseline and safety.    Clinical Decision Making (Complexity) Low complexity   Therapy Frequency` 1 time/week   Risk & Benefits of therapy have been  "explained Yes   Patient, Family & other staff in agreement with plan of care Yes   [facility] AM-PAC TM \"6 Clicks\"   2016, Trustees of [facility], under license to [employer].  All rights reserved.   6 Clicks Short Forms Basic Mobility Inpatient Short Form   [facility] AM-PAC  \"6 Clicks\" V.2 Basic Mobility Inpatient Short Form   1. Turning from your back to your side while in a flat bed without using bedrails? 4 - None   2. Moving from lying on your back to sitting on the side of a flat bed without using bedrails? 4 - None   3. Moving to and from a bed to a chair (including a wheelchair)? 4 - None   4. Standing up from a chair using your arms (e.g., wheelchair, or bedside chair)? 3 - A Little   5. To walk in hospital room? 3 - A Little   6. Climbing 3-5 steps with a railing? 3 - A Little   Basic Mobility Raw Score (Score out of 24.Lower scores equate to lower levels of function) 21   Total Evaluation Time   Total Evaluation Time (Minutes) 20     "

## 2017-06-20 NOTE — PLAN OF CARE
Problem: Discharge Planning  Goal: Discharge Planning (Adult, OB, Behavioral, Peds)  PRIMARY DIAGNOSIS: L Hip Pain  Primary Symptoms: Pain with movement      OUTPATIENT/OBSERVATION GOALS TO BE MET BEFORE DISCHARGE:      1. Orthostatic symptoms (BP decrease or HR increase with patient upright)?  N/A  2. Vital Signs stable? Yes  3. Documented urine output: yes  4. Tolerating PO fluid: Yes  5. Symptoms improved: No  6. Labs WNL?yes  7. ADLs back to baseline?  No  8. Activity and level of assistance: Up with stand by assistance.   9. Pain status: Improved-controlled with oral pain medications.  10. Barriers to discharge noted Yes. Pt lives in an independent senior living      Interpretation of rhythm per telemetry tech: N/A      Pt is alert and oriented. Pt ambulates with stand by assistance with 9/10 hip pain. Norco administered for pain. CT of hip shows fracture and xray of L arm showed a possible dislocated fracture. Pt concerned about mobility issues when returning home, but PT has cleared. Ortho states weight bear as tolerated. Will continue to monitor and offer supportive services.

## 2017-06-20 NOTE — PLAN OF CARE
Problem: Goal Outcome Summary  Goal: Goal Outcome Summary  Discharge Planner PT   Patient plan for discharge: Home today  Current status: PT evaluation completed. Pt demonstrates SBA to I transfers, bed mobilities and ambulation with 2WW in safe, slow, step to gait pattern. Pt reports 9/10 pain in L lateral hip throughout Rx. Deferred medication questions to nursing staff.   Barriers to return to prior living situation: None  Recommendations for discharge: Home today  Rationale for recommendations: Pt at functional baseline, safe ambulation with 2WW (has 4WW at home to use). No further rehabilitation needs at this time.        Entered by: Tiff Decker 06/20/2017 10:31 AM

## 2017-06-20 NOTE — PROGRESS NOTES
Pt seen and examined.  Left wrist quite painful, bruised and swollen.  Still a lot of pain in the left hip.  Plan for CT of the wrist (cannot r/o fracture on xray) and of the left hip b/c of persistent severe pain.  SW assisted with disposition planning.

## 2017-06-20 NOTE — PLAN OF CARE
ROOM # 231-2    Living Situation (if not independent, order SW consult):Lives in independent living  Facility name:[facility]  : Gael(son) [phone]    Activity level at baseline: Independent  Activity level on admit: A2      Patient registered to observation; given Patient Bill of Rights; given the opportunity to ask questions about observation status and their plan of care.  Patient has been oriented to the observation room, bathroom and call light is in place.    Discussed discharge goals and expectations with patient/family.

## 2017-06-21 VITALS
RESPIRATION RATE: 17 BRPM | TEMPERATURE: 97.3 F | DIASTOLIC BLOOD PRESSURE: 44 MMHG | OXYGEN SATURATION: 94 % | HEART RATE: 63 BPM | SYSTOLIC BLOOD PRESSURE: 125 MMHG

## 2017-06-21 PROCEDURE — 99217 ZZC OBSERVATION CARE DISCHARGE: CPT | Performed by: INTERNAL MEDICINE

## 2017-06-21 RX ORDER — CIPROFLOXACIN 250 MG/1
250 TABLET, FILM COATED ORAL 2 TIMES DAILY
Qty: 14 TABLET | Refills: 0 | Status: ON HOLD
Start: 2017-06-21 | End: 2017-06-24

## 2017-06-21 RX ORDER — HYDROCODONE BITARTRATE AND ACETAMINOPHEN 5; 325 MG/1; MG/1
1 TABLET ORAL EVERY 4 HOURS PRN
Qty: 20 TABLET | Refills: 0 | Status: ON HOLD | OUTPATIENT
Start: 2017-06-21 | End: 2017-06-24

## 2017-06-21 RX ORDER — NYSTATIN 100000/ML
500000 SUSPENSION, ORAL (FINAL DOSE FORM) ORAL 4 TIMES DAILY
Qty: 180 ML | Refills: 0 | Status: SHIPPED | OUTPATIENT
Start: 2017-06-21 | End: 2017-06-30

## 2017-06-21 RX ORDER — ESTRADIOL 0.1 MG/G
2 CREAM VAGINAL
Qty: 6 G | Refills: 1 | Status: SHIPPED | OUTPATIENT
Start: 2017-06-22 | End: 2017-06-21

## 2017-06-21 RX ORDER — ESTRADIOL 0.1 MG/G
2 CREAM VAGINAL
Qty: 6 G | Refills: 1 | Status: SHIPPED | OUTPATIENT
Start: 2017-06-21 | End: 2018-07-05

## 2017-06-21 RX ORDER — HYDROCODONE BITARTRATE AND ACETAMINOPHEN 5; 325 MG/1; MG/1
1 TABLET ORAL EVERY 4 HOURS PRN
Status: DISCONTINUED | OUTPATIENT
Start: 2017-06-21 | End: 2017-06-21 | Stop reason: HOSPADM

## 2017-06-21 RX ORDER — HYDROXYZINE HYDROCHLORIDE 25 MG/1
25 TABLET, FILM COATED ORAL EVERY 4 HOURS PRN
Status: DISCONTINUED | OUTPATIENT
Start: 2017-06-21 | End: 2017-06-21 | Stop reason: HOSPADM

## 2017-06-21 RX ORDER — HYDROXYZINE HYDROCHLORIDE 25 MG/1
25 TABLET, FILM COATED ORAL EVERY 4 HOURS PRN
Qty: 20 TABLET | Refills: 0 | Status: SHIPPED | OUTPATIENT
Start: 2017-06-21 | End: 2017-07-10

## 2017-06-21 RX ADMIN — ISOSORBIDE DINITRATE 10 MG: 10 TABLET ORAL at 08:05

## 2017-06-21 RX ADMIN — HYDROXYZINE HYDROCHLORIDE 25 MG: 25 TABLET ORAL at 11:33

## 2017-06-21 RX ADMIN — METOPROLOL SUCCINATE 100 MG: 25 TABLET, EXTENDED RELEASE ORAL at 08:08

## 2017-06-21 RX ADMIN — CLONIDINE HYDROCHLORIDE 0.1 MG: 0.1 TABLET ORAL at 08:06

## 2017-06-21 RX ADMIN — HYDROCODONE BITARTRATE AND ACETAMINOPHEN 2 TABLET: 5; 325 TABLET ORAL at 08:04

## 2017-06-21 RX ADMIN — SENNOSIDES AND DOCUSATE SODIUM 1 TABLET: 8.6; 5 TABLET ORAL at 08:07

## 2017-06-21 RX ADMIN — HYDROCODONE BITARTRATE AND ACETAMINOPHEN 1 TABLET: 5; 325 TABLET ORAL at 11:33

## 2017-06-21 RX ADMIN — NYSTATIN 500000 UNITS: 100000 SUSPENSION ORAL at 08:09

## 2017-06-21 RX ADMIN — HYDROXYZINE HYDROCHLORIDE 25 MG: 25 TABLET ORAL at 08:04

## 2017-06-21 RX ADMIN — ASPIRIN 81 MG: 81 TABLET, COATED ORAL at 08:09

## 2017-06-21 RX ADMIN — LISINOPRIL 40 MG: 20 TABLET ORAL at 08:09

## 2017-06-21 RX ADMIN — GABAPENTIN 300 MG: 300 CAPSULE ORAL at 08:08

## 2017-06-21 RX ADMIN — SERTRALINE HYDROCHLORIDE 50 MG: 50 TABLET ORAL at 08:06

## 2017-06-21 RX ADMIN — CIPROFLOXACIN HYDROCHLORIDE 250 MG: 250 TABLET, FILM COATED ORAL at 08:05

## 2017-06-21 RX ADMIN — AMLODIPINE BESYLATE 10 MG: 5 TABLET ORAL at 08:06

## 2017-06-21 NOTE — PROGRESS NOTES
PRIMARY DIAGNOSIS: L Hip Pain  Primary Symptoms: Pain with movement      OUTPATIENT/OBSERVATION GOALS TO BE MET BEFORE DISCHARGE:      1. Orthostatic symptoms (BP decrease or HR increase with patient upright)?  N/A  2. Vital Signs stable? Yes  3. Documented urine output: yes  4. Tolerating PO fluid: Yes  5. Symptoms improved: Yes  6. Labs WNL? yes  7. ADLs back to baseline?  Yes  8. Activity and level of assistance: Up with stand by assistance.   9. Pain status: Improved-controlled with oral pain medications.  10. Barriers to discharge noted No      Interpretation of rhythm per telemetry tech: N/A      VSS. Pt A&Ox4. Pt c/o pain in hip rating 8/10 - PRN norco and atarax given and effective pain management. Pt left wrist is bruised and tender - No fracture seen in imaging (old, healed fx). Pt up with SBA and a walker in the room. Plan is for pt to discharge home with home care services. SW consult for assistance with setting that up. Will continue to monitor and provide supportive cares.

## 2017-06-21 NOTE — PLAN OF CARE
Problem: Discharge Planning  Goal: Discharge Planning (Adult, OB, Behavioral, Peds)  Outcome: Improving  PRIMARY DIAGNOSIS: L Hip Pain  Primary Symptoms: Pain with movement      OUTPATIENT/OBSERVATION GOALS TO BE MET BEFORE DISCHARGE:      1. Orthostatic symptoms (BP decrease or HR increase with patient upright)?  N/A  2. Vital Signs stable? yes  3. Documented urine output: no  4. Tolerating PO fluid: Yes  5. Symptoms improved: No  6. Labs WNL?yes  7. ADLs back to baseline?  No  8. Activity and level of assistance: Up with stand by assistance.   9. Pain status: Improved-controlled with oral pain medications.  10. Barriers to discharge noted Yes. Pt lives in an independent senior living      Interpretation of rhythm per telemetry tech: N/A      Pt is alert and oriented. Continues to have pain with movement but when resting in bed she states that she is comfortable.  CT of hip shows fracture and xray of L arm showed a possible dislocated fracture. Pt concerned about mobility issues when returning home, but PT has cleared. Ortho states weight bear as tolerated. Pt will DC either home or to [facility] today.  Will continue to monitor and offer supportive services.

## 2017-06-21 NOTE — PLAN OF CARE
Problem: Discharge Planning  Goal: Discharge Planning (Adult, OB, Behavioral, Peds)  Outcome: Improving  PRIMARY DIAGNOSIS: L Hip Pain  Primary Symptoms: Pain with movement      OUTPATIENT/OBSERVATION GOALS TO BE MET BEFORE DISCHARGE:      1. Orthostatic symptoms (BP decrease or HR increase with patient upright)?  N/A  2. Vital Signs stable? Yes  3. Documented urine output: yes  4. Tolerating PO fluid: Yes  5. Symptoms improved: No  6. Labs WNL?yes  7. ADLs back to baseline?  No  8. Activity and level of assistance: Up with stand by assistance.   9. Pain status: Improved-controlled with oral pain medications.  10. Barriers to discharge noted Yes. Pt lives in an independent senior living      Interpretation of rhythm per telemetry tech: N/A      Pt is alert and oriented. CT of hip shows fracture and xray of L arm showed a possible dislocated fracture. Pt concerned about mobility issues when returning home, but PT has cleared. Ortho states weight bear as tolerated. Pt will DC either home or to [facility] tomorrow. Will continue to monitor and offer supportive services.

## 2017-06-21 NOTE — PROGRESS NOTES
Patient's After Visit Summary was reviewed with patient.  Patient verbalized understanding of After Visit Summary, recommended follow up and was given an opportunity to ask questions.   Discharge medications sent home with patient/family: Not applicable   Discharged with son

## 2017-06-21 NOTE — PLAN OF CARE
Problem: Discharge Planning  Goal: Discharge Planning (Adult, OB, Behavioral, Peds)  PRIMARY DIAGNOSIS: L Hip Pain  Primary Symptoms: Pain with movement      OUTPATIENT/OBSERVATION GOALS TO BE MET BEFORE DISCHARGE:      1. Orthostatic symptoms (BP decrease or HR increase with patient upright)?  N/A  2. Vital Signs stable? Yes  3. Documented urine output: yes  4. Tolerating PO fluid: Yes  5. Symptoms improved: No  6. Labs WNL?yes  7. ADLs back to baseline?  No  8. Activity and level of assistance: Up with stand by assistance.   9. Pain status: Improved-controlled with oral pain medications.  10. Barriers to discharge noted Yes. Pt lives in an independent senior living      Interpretation of rhythm per telemetry tech: N/A      Pt is alert and oriented. Pt ambulates with stand by assistance with 9/10 hip pain. Norco administered for pain, Atarax added for additional pain. CT of hip shows fracture and xray of L arm showed a possible dislocated fracture. Pt concerned about mobility issues when returning home, but PT has cleared. Ortho states weight bear as tolerated. Pt will DC either home or to [facility] tomorrow. Will continue to monitor and offer supportive services.

## 2017-06-21 NOTE — PROGRESS NOTES
[facility] has been updated and the below DC referrals:  Additional Services      Home Care OT Referral for Hospital Discharge      OT to eval and treat     Your provider has ordered home care - occupational therapy. If you have not been contacted within 2 days of your discharge please call the department phone number listed on the top of this document.            Home Care PT Referral for Hospital Discharge      PT to eval and treat     Your provider has ordered home care - physical therapy. If you have not been contacted within 2 days of your discharge please call the department phone number listed on the top of this document.            Home Care Social Service Referral for Hospital Discharge       to assist in home safety eval with home RN and therapies     Your provider has ordered home care - . If you have not been contacted within 2 days of your discharge please call the department phone number listed on the top of this document.            Home care nursing referral      RN skilled nursing visit. RN to assess vital signs and weight, patients ability to take and record daily blood pressure, temp and weight and home safety.     Your provider has ordered home care nursing services. If you have not been contacted within 2 days of your discharge please call the inpatient department phone number at [phone] .            Merly Hickey RN BSN CTS  [facility]   Care Management Coordinator  [email or web address]   [phone]

## 2017-06-21 NOTE — PROGRESS NOTES
Nurses notes are wrong. Patient does not have a dislocated fracture of the wrist. In fact she has no fracture at all.

## 2017-06-21 NOTE — PLAN OF CARE
Problem: Goal Outcome Summary  Goal: Goal Outcome Summary  PT -  Pt to be discharged home today with services of SN, PT, OT, SW, HHA with goals not met d/t early discharge.  Please refer to discharge summary.

## 2017-06-21 NOTE — PLAN OF CARE
Problem: Discharge Planning  Goal: Discharge Planning (Adult, OB, Behavioral, Peds)  Outcome: Improving  PRIMARY DIAGNOSIS: L Hip Pain  Primary Symptoms: Pain with movement      OUTPATIENT/OBSERVATION GOALS TO BE MET BEFORE DISCHARGE:      1. Orthostatic symptoms (BP decrease or HR increase with patient upright)?  N/A  2. Vital Signs stable? yes  3. Documented urine output: no  4. Tolerating PO fluid: Yes  5. Symptoms improved: No  6. Labs WNL?yes  7. ADLs back to baseline?  No  8. Activity and level of assistance: Up with stand by assistance.   9. Pain status: Improved-controlled with oral pain medications.  10. Barriers to discharge noted Yes. Pt lives in an independent senior living      Interpretation of rhythm per telemetry tech: N/A      Pt is alert and oriented. Continues to have pain with movement.  CT of hip shows fracture and xray of L arm showed a possible dislocated fracture. Pt concerned about mobility issues when returning home, but PT has cleared. Ortho states weight bear as tolerated. Pt will DC either home or to [facility] tomorrow. Will continue to monitor and offer supportive services.

## 2017-06-21 NOTE — DISCHARGE SUMMARY
[facility]    Discharge Summary  Hospitalist    Date of Admission:  6/19/2017  Date of Discharge:  6/21/2017 11:36 AM  Provider:  Brittnee Petersen DO    Discharge Diagnoses   1.  Mechanical fall at home  2.  Nondisplaced left greater trochanteric hip fracture.   3.  Left wrist pain likely contusion or nonspecific sprain.   4.  Recent UTI - E. Coli, pan-sensitive  5.  CAD with NSTEMI 5/28/17  6.  Oral thrush    Other medical issues:  Past Medical History:   Diagnosis Date     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 4/16/2014     Closed fracture of unspecified part of neck of femur      Closed fracture of unspecified part of vertebral column without mention of spinal cord injury     from fall summer 06     Elevated blood pressure reading without diagnosis of hypertension      Hypertension      Macular degeneration (senile) of retina, unspecified        History of Present Illness   Daya Ignacio is an 86 year old female who presented to the ER with left hip and wrist pain after a mechanical fall at home.  Please see the admission history and physical for full details.    Hospital Course   Daya Ignacio was admitted on 6/19/2017.  The following problems were addressed during her hospitalization:    1.  Mechanical fall at home resulting in nondisplaced left greater trochanteric hip fracture and left wrist contusion  Daya presents to the ER after sustaining a fall at home.  Xray of the pelvis and hip were negative for acute fracture in the ER.  She continued to have significant pain with ambulation and so a CT of the left hip was ordered which revealed a non-displaced fracture, as above.  She was seen in consultation by orthopedic surgery who recommended that she be allowed to weight-bear as tolerated on the fracture and f/u in the ortho clinic in 2 wks.  She was doing fairly well with tylenol and small-dose atarax/oxycodone for pain at time of d/c.     She had significant bruising to the left  "hand, but no clear fracture on xray or CT scan.    2. UTI  She was recently placed on cipro for dysuria in a pre-op visit 6/16/17.  Her UC was reviewed as E.coli and included sensitivities to Cipro.  Her repeat UA on admission here was clear.  She will need to only take one additional dose of cipro tonight after d/c.    3.  Recent NSTEMI - 5/28/17  She missed her outpt cardiology f/u while hospitalized here.  She has self-stopped her atorvastatin b/c of \"inability to walk\" and was hesitant to trial another statin medication without further discussing with Cardiology.  Discussed at length with her on day of d/c. No other changes were made in her cardiac medications.    4.  Oral thrush  Nystatin swish and swallow ordered at time of d/c.    Significant Results and Procedures       Pending Results   Unresulted Labs Ordered in the Past 30 Days of this Admission     No orders found from 4/20/2017 to 6/20/2017.          Code Status   Full Code       Primary Care Physician   Marcy Soares PA-C    Physical Exam   Temp: 97.3  F (36.3  C) Temp src: Oral BP: 125/44 Pulse: 63 Heart Rate: 54 Resp: 17 SpO2: 94 % O2 Device: None (Room air)    There were no vitals filed for this visit.  Vital Signs with Ranges  Temp:  [97.3  F (36.3  C)-99.8  F (37.7  C)] 97.3  F (36.3  C)  Pulse:  [63] 63  Heart Rate:  [54-70] 54  Resp:  [16-18] 17  BP: (121-182)/(42-67) 125/44  SpO2:  [91 %-96 %] 94 %  I/O last 3 completed shifts:  In: 440 [P.O.:440]  Out: -   PT SEEN AND EXAMINED ON DAY OF D/C  GEN:  Alert, oriented x 3, comfortable in bed at rest, mildly uncomfortable but ambulating slow when up out of bed  HEENT:  Normocephalic/atraumatic, PERRL, no scleral icterus, no nasal discharge, mouth moist, no oral ulcers, mild thrush noted.  CV:  Regular rate and rhythm, no murmur to ausc.  S1 + S2 noted, no S3 or S4.  LUNGS:  Clear to auscultation ant/lat bilaterally.  No rales/rhonchi/wheezing auscultated bilaterally.  No costal retractions " bilaterally.  Symmetric chest rise on inhalation noted.  ABD:  Active bowel sounds, soft, non-tender/non-distended.  No rebound/guarding/rigidity.  No masses palpated.  No obvious HSM to exam.  EXT:  No edema or cyanosis bilaterally. No joint synovitis noted.  No calf-tenderness or asymmetry noted.  Left wrist with good ROM.  Extensive bruising to the left wrist.  PSYCH:  Mood appropriate, Not tearful or depressed.  Maintains direct eye contact.      Discharge Disposition   Discharged to home    Consultations This Hospital Stay   PHYSICAL THERAPY ADULT IP CONSULT  SOCIAL WORK IP CONSULT  ORTHOPEDIC SURGERY IP CONSULT  PHYSICAL THERAPY ADULT IP CONSULT  OCCUPATIONAL THERAPY ADULT IP CONSULT    Time Spent on this Encounter   IBrittnee, personally saw the patient today and spent greater than 30 minutes discharging this patient.    Discharge Orders     Home care nursing referral     Home Care PT Referral for Hospital Discharge     Home Care OT Referral for Hospital Discharge     Home Care Social Service Referral for Hospital Discharge     Reason for your hospital stay   You were admitted for hip/leg pain and wrist pain after a fall at home.  You were found to have a right-sided trochanteric fracture on CT scan and were evaluated by Orthopedic surgery.  There is no need for surgical fixation of the fracture at this time.  You will need to use your walker at home and will be followed by home therapies and home RN at time of d/c.     Follow-up and recommended labs and tests    F/u with PCP for hospital f/u in 1-2 wks.  F/U with Dr. Coleman ( orthopedics) in 2 wks for hospital f/u of right-sided trochanteric fracture.     Activity   Your activity upon discharge: activity as tolerated with walker.  No driving.     MD face to face encounter   Documentation of Face to Face and Certification for Home Health Services    I certify that patient: Daya Ignacio is under my care and that I, or a nurse  practitioner or physician's assistant working with me, had a face-to-face encounter that meets the physician face-to-face encounter requirements with this patient on: 6/21/2017.    This encounter with the patient was in whole, or in part, for the following medical condition, which is the primary reason for home health care: fall and right-sided trochanteric fracture.    I certify that, based on my findings, the following services are medically necessary home health services: Nursing, Occupational Therapy and Physical Therapy.    My clinical findings support the need for the above services because: Nurse is needed: To provide assessment and oversight required in the home to assure adherence to the medical plan due to: recent fall and fracture, Occupational Therapy Services are needed to assess and treat cognitive ability and address ADL safety due to impairment in fall and recent fracture.  Cognitive evaluation for home safety is needed. and Physical Therapy Services are needed to assess and treat the following functional impairments: fall and recent fracture  Further, I certify that my clinical findings support that this patient is homebound (i.e. absences from home require considerable and taxing effort and are for medical reasons or Mosque services or infrequently or of short duration when for other reasons) because: Requires assistance of another person or specialized equipment to access medical services because patient: Has prohibitive pain during ambulation...    Based on the above findings. I certify that this patient is confined to the home and needs intermittent skilled nursing care, physical therapy and/or speech therapy.  The patient is under my care, and I have initiated the establishment of the plan of care.  This patient will be followed by a physician who will periodically review the plan of care.  Physician/Provider to provide follow up care: Marcy Soares    Attending [facility] physician  (the Medicare certified [facility] provider): Brittnee Petersen  Physician Signature: See electronic signature associated with these discharge orders.  Date: 6/21/2017     Full Code     Diet   Follow this diet upon discharge: resume cardiac diet at time of d/c.       Discharge Medications   Current Discharge Medication List      START taking these medications    Details   HYDROcodone-acetaminophen (NORCO) 5-325 MG per tablet Take 1 tablet by mouth every 4 hours as needed for moderate to severe pain  Qty: 20 tablet, Refills: 0    Associated Diagnoses: Hip pain, left      hydrOXYzine (ATARAX) 25 MG tablet Take 1 tablet (25 mg) by mouth every 4 hours as needed (pain)  Qty: 20 tablet, Refills: 0    Associated Diagnoses: Hip pain, left      nystatin (MYCOSTATIN) 272932 UNIT/ML suspension Swish and spit 5 mLs (500,000 Units) in mouth 4 times daily for 9 days  Qty: 180 mL, Refills: 0    Associated Diagnoses: Oral thrush      estradiol (ESTRACE) 0.1 MG/GM cream Place 2 g vaginally three times a week paraben-free  Qty: 6 g, Refills: 1    Associated Diagnoses: Symptomatic menopausal or female climacteric states         CONTINUE these medications which have CHANGED    Details   ciprofloxacin (CIPRO) 250 MG tablet Take 1 tablet (250 mg) by mouth 2 times daily  Qty: 14 tablet, Refills: 0    Associated Diagnoses: Urinary tract infection without hematuria, site unspecified         CONTINUE these medications which have NOT CHANGED    Details   Cholecalciferol (VITAMIN D3) 2000 UNITS CAPS Take 2,000 Units by mouth daily (with dinner)      Multiple Vitamins-Minerals (PRESERVISION AREDS) CAPS Take 1 capsule by mouth 2 times daily      cyanocobalamin (VITAMIN  B-12) 1000 MCG tablet Take 1,000 mcg by mouth every morning      metoprolol (TOPROL-XL) 100 MG 24 hr tablet TAKE ONE TABLET BY MOUTH DAILY  Qty: 90 tablet, Refills: 1    Associated Diagnoses: HTN (hypertension), benign      aspirin EC 81 MG EC tablet Take 1 tablet (81 mg) by  mouth daily  Qty: 30 tablet, Refills: 3    Associated Diagnoses: Non-STEMI (non-ST elevated myocardial infarction) (H)      isosorbide dinitrate (ISORDIL) 10 MG tablet Take 1 tablet (10 mg) by mouth 3 times daily  Qty: 90 tablet, Refills: 3    Associated Diagnoses: Non-STEMI (non-ST elevated myocardial infarction) (H)      amLODIPine (NORVASC) 10 MG tablet Take 1 tablet (10 mg) by mouth daily  Qty: 30 tablet, Refills: 3    Associated Diagnoses: Hypertension goal BP (blood pressure) < 140/90      gabapentin (NEURONTIN) 300 MG capsule TAKE ONE CAPSULE BY MOUTH THREE TIMES A DAY  Qty: 180 capsule, Refills: 1    Associated Diagnoses: Neuropathy (H)      sertraline (ZOLOFT) 50 MG tablet Take 1 tablet (50 mg) by mouth daily  Qty: 90 tablet, Refills: 1    Associated Diagnoses: Generalized anxiety disorder      lisinopril (PRINIVIL/ZESTRIL) 40 MG tablet Take 1 tablet (40 mg) by mouth daily  Qty: 90 tablet, Refills: 1    Associated Diagnoses: Essential hypertension      cloNIDine (CATAPRES) 0.1 MG tablet Take 1 tablet (0.1 mg) by mouth 2 times daily  Qty: 180 tablet, Refills: 2    Associated Diagnoses: Essential hypertension         STOP taking these medications       acetaminophen-codeine (TYLENOL/CODEINE #3) 300-30 MG per tablet Comments:   Reason for Stopping:         conjugated estrogens (PREMARIN) vaginal cream Comments:   Reason for Stopping:             Allergies   Allergies   Allergen Reactions     Atorvastatin Muscle Pain (Myalgia)     Macrobid [Nitrofurantoin Anhydrous] Other (See Comments)     Body aches     Penicillins      Sulfa Drugs      Data     Recent Labs  Lab 06/20/17  1259 06/19/17  1252   WBC 11.7* 15.4*   HGB 10.7* 11.7   HCT 32.1* 36.5   MCV 93 95   * 189       Recent Labs  Lab 06/20/17  1259 06/19/17  1252    137   POTASSIUM 4.0 3.6   CHLORIDE 105 105   CO2 28 27   ANIONGAP 4 5   * 140*   BUN 20 17   CR 0.49* 0.57   GFRESTIMATED >90Non  GFR Calc >90Non   American GFR Calc   GFRESTBLACK >90African American GFR Calc >90African American GFR Calc   DAVE 8.8 9.0       Recent Labs  Lab 06/16/17  1328   CULT >100,000 colonies/mL Escherichia coli*       Recent Labs  Lab 06/19/17  1549 06/16/17  1328   COLOR Yellow Yellow   APPEARANCE Clear Cloudy   URINEGLC Negative Negative   URINEBILI Negative Negative   URINEKETONE 5* Negative   SG 1.010 1.015   UBLD Negative Trace*   URINEPH 7.0 5.5   PROTEIN Negative Negative   UROBILINOGEN  --  0.2   NITRITE Negative Positive*   LEUKEST Negative Large*   RBCU <1 2-5*   WBCU <1 >100*     Results for orders placed or performed during the hospital encounter of 06/19/17   XR Pelvis and Hip Left 1 View    Narrative    XR PELVIS AND HIP LEFT 1 VIEW  6/19/2017 1:53 PM    HISTORY:  pain, trauma    COMPARISON:  Prior right hip and pelvic images through 11/3/2010.      Impression    IMPRESSION:  Partially visualized right hip arthroplasty without  evidence for complication. There is a compression screw in the left  femoral neck with proximal femoral plate and screw fixation. No  obvious acute complication. Diffuse osteopenia.    MANDI CEBALLOS MD   Wrist XR, G/E 3 views, left    Narrative    XR WRIST LEFT G/E 3 VIEWS    6/19/2017 4:57 PM      HISTORY: pain, trauma    COMPARISON: None.    FINDINGS:  There is normal osseous alignment. There is a very slight  buckle of the medial cortex of the distal radius. This could be due to  a very subtle fracture. Its possible its due to the used epiphyseal  plate. Clinical correlation suggested. There does appear to be soft  tissue swelling over the distal radius region.         Impression    IMPRESSION: Possible nondisplaced fracture distal radius. Clinical  correlation suggested. If clinically indicated, CT could be performed  for further evaluation.    JANIS HOLDER MD   CT Wrist Left w/o Contrast    Narrative    CT WRIST LEFT WITHOUT CONTRAST June 20, 2017 11:04 AM     HISTORY: Rule out possible fracture  seen on x-ray.    COMPARISON: 6/19/2017 radiographs.    TECHNIQUE: Radiation dose for this scan was reduced using automated  exposure control, adjustment of the mA and/or kV according to patient  size, or iterative reconstruction technique.    FINDINGS: Cortical irregularity is noted at the junction of the distal  radial epiphysis/metaphysis along the dorsal/radial cortical margin.  This could be related to an old healed fracture. No intramedullary  fracture line is visible. The distal ulna appears intact. Alignment at  the distal radial ulnar joint appears within normal limits. There is  mild prominence of the scapholunate space which could be related to  normal variation. Scapholunate ligament injury cannot be entirely  excluded. Radiocarpal and midcarpal joint space widths appear within  normal limits. Moderately advanced degenerative arthrosis is noted at  the first carpometacarpal joint. On the distal edge of the scan, there  is a fat-containing lesion, possibly a lipoma distal to the carpal  tunnel.      Impression    IMPRESSION:  1. Distal radial cortical irregularity which may be related to an old  healed fracture. No acute intramedullary fracture line is visible. If  there is continued clinical suspicion for acute distal radial injury,  MRI would be or sensitive for trabecular injury/intramedullary edema.  2. Scapholunate joint space mild prominence. This may be related to  normal variation. However, scapholunate ligament injury is not  entirely excluded. Radiocarpal and midcarpal joint space widths appear  within normal limits.  3. First carpometacarpal joint moderately advanced degenerative  arthrosis.  4. Fat-containing lesion, likely a lipoma, immediately distal to the  carpal tunnel, not completely evaluated with this scan.    LANEY TERRY MD   CT Hip Left w/o Contrast    Narrative    CT HIP LEFT WITHOUT CONTRAST June 20, 2017 11:04 AM     HISTORY: Fall and pain.    TECHNIQUE: Radiation dose for this  scan was reduced using automated  exposure control, adjustment of the mA and/or kV according to patient  size, or iterative reconstruction technique.    FINDINGS: The bones are diffusely osteopenic. Right total hip  arthroplasty with longstem femoral component is noted. The tip of this  femoral stem extends off the inferior border of the scan. At the left  hip, there is a dynamic compression screw and lateral side plate.  Although the intertrochanteric fracture subtended by the compression  screw appears to be healed, there appears to be an acute or subacute  fracture across the base of the left greater trochanter with extension  into the anterior cortex of the intertrochanteric region. Adjacent  soft tissue edema and possible soft tissue hematoma is noted. There is  deformity of the right superior and inferior pubic rami likely related  to healed fractures. The remainder of the osseous pelvis and sacrum  appears to be grossly intact.      Impression    IMPRESSION:  1. Left hip dynamic compression screw and sideplate subtending what  appears to be a healed intertrochanteric fracture.  2. Left hip superimposed acute or subacute greater trochanteric  fracture with extension into the anterior cortex of the  intertrochanteric region. Adjacent soft tissue edema and/or hemorrhage  is noted.  3. Right superior/inferior pubic rami deformity compatible with old  healed fracture.  4. Right total hip arthroplasty.  5. Diffuse osteopenia.    LANEY TERRY MD

## 2017-06-21 NOTE — PROGRESS NOTES
[facility]  Met with pt to discuss plans for HC.  Pt to be discharged home today and has agreed to have [facility] follow with services of SN, PT, OT, SW, HHA.  Patient care support center processing referral.  Pt verbalized understanding that initial visit is scheduled for 6/22 or 6/23/17. Pt has 24 hour phone number for [facility] for any questions or concerns.

## 2017-06-22 ENCOUNTER — TELEPHONE (OUTPATIENT)
Dept: FAMILY MEDICINE | Facility: CLINIC | Age: 82
End: 2017-06-22

## 2017-06-22 ENCOUNTER — HOSPITAL ENCOUNTER (INPATIENT)
Facility: CLINIC | Age: 82
LOS: 2 days | Discharge: NURSING, REHAB OR LONG-TERM CARE | DRG: 393 | End: 2017-06-24
Attending: PHYSICIAN ASSISTANT | Admitting: INTERNAL MEDICINE
Payer: MEDICARE

## 2017-06-22 DIAGNOSIS — R19.7 DIARRHEA, UNSPECIFIED TYPE: ICD-10-CM

## 2017-06-22 DIAGNOSIS — M25.552 HIP PAIN, LEFT: ICD-10-CM

## 2017-06-22 DIAGNOSIS — I10 HTN (HYPERTENSION), BENIGN: ICD-10-CM

## 2017-06-22 DIAGNOSIS — R53.1 WEAKNESS: ICD-10-CM

## 2017-06-22 DIAGNOSIS — S72.102A CLOSED FRACTURE OF TROCHANTER OF LEFT FEMUR, INITIAL ENCOUNTER (H): Primary | ICD-10-CM

## 2017-06-22 LAB
ALBUMIN UR-MCNC: NEGATIVE MG/DL
ANION GAP SERPL CALCULATED.3IONS-SCNC: 9 MMOL/L (ref 3–14)
APPEARANCE UR: ABNORMAL
BACTERIA #/AREA URNS HPF: ABNORMAL /HPF
BASOPHILS # BLD AUTO: 0 10E9/L (ref 0–0.2)
BASOPHILS NFR BLD AUTO: 0.2 %
BILIRUB UR QL STRIP: NEGATIVE
BUN SERPL-MCNC: 40 MG/DL (ref 7–30)
C DIFF TOX B STL QL: NORMAL
CALCIUM SERPL-MCNC: 8.5 MG/DL (ref 8.5–10.1)
CAMPYLOBACTER GROUP BY NAT: NOT DETECTED
CHLORIDE SERPL-SCNC: 102 MMOL/L (ref 94–109)
CO2 BLDCOV-SCNC: 23 MMOL/L (ref 21–28)
CO2 SERPL-SCNC: 24 MMOL/L (ref 20–32)
COLOR UR AUTO: ABNORMAL
CREAT SERPL-MCNC: 1.1 MG/DL (ref 0.52–1.04)
DIFFERENTIAL METHOD BLD: ABNORMAL
ENTERIC PATHOGEN COMMENT: NORMAL
EOSINOPHIL # BLD AUTO: 0 10E9/L (ref 0–0.7)
EOSINOPHIL NFR BLD AUTO: 0 %
ERYTHROCYTE [DISTWIDTH] IN BLOOD BY AUTOMATED COUNT: 15.4 % (ref 10–15)
GFR SERPL CREATININE-BSD FRML MDRD: 47 ML/MIN/1.7M2
GLUCOSE SERPL-MCNC: 127 MG/DL (ref 70–99)
GLUCOSE UR STRIP-MCNC: NEGATIVE MG/DL
HCT VFR BLD AUTO: 30.6 % (ref 35–47)
HGB BLD-MCNC: 10.1 G/DL (ref 11.7–15.7)
HGB UR QL STRIP: NEGATIVE
HYALINE CASTS #/AREA URNS LPF: 17 /LPF (ref 0–2)
IMM GRANULOCYTES # BLD: 0.1 10E9/L (ref 0–0.4)
IMM GRANULOCYTES NFR BLD: 0.4 %
KETONES UR STRIP-MCNC: NEGATIVE MG/DL
LACTATE BLD-SCNC: 1.6 MMOL/L (ref 0.7–2.1)
LEUKOCYTE ESTERASE UR QL STRIP: NEGATIVE
LYMPHOCYTES # BLD AUTO: 1.2 10E9/L (ref 0.8–5.3)
LYMPHOCYTES NFR BLD AUTO: 7.2 %
MCH RBC QN AUTO: 30.7 PG (ref 26.5–33)
MCHC RBC AUTO-ENTMCNC: 33 G/DL (ref 31.5–36.5)
MCV RBC AUTO: 93 FL (ref 78–100)
MONOCYTES # BLD AUTO: 1.1 10E9/L (ref 0–1.3)
MONOCYTES NFR BLD AUTO: 6.8 %
MUCOUS THREADS #/AREA URNS LPF: PRESENT /LPF
NEUTROPHILS # BLD AUTO: 13.9 10E9/L (ref 1.6–8.3)
NEUTROPHILS NFR BLD AUTO: 85.4 %
NITRATE UR QL: NEGATIVE
NOROVIRUS I AND II BY NAT: NOT DETECTED
NRBC # BLD AUTO: 0 10*3/UL
NRBC BLD AUTO-RTO: 0 /100
PCO2 BLDV: 39 MM HG (ref 40–50)
PH BLDV: 7.38 PH (ref 7.32–7.43)
PH UR STRIP: 5 PH (ref 5–7)
PLATELET # BLD AUTO: 144 10E9/L (ref 150–450)
PO2 BLDV: 17 MM HG (ref 25–47)
POTASSIUM SERPL-SCNC: 3.4 MMOL/L (ref 3.4–5.3)
RBC # BLD AUTO: 3.29 10E12/L (ref 3.8–5.2)
RBC #/AREA URNS AUTO: 1 /HPF (ref 0–2)
ROTAVIRUS A BY NAT: NOT DETECTED
SALMONELLA SPECIES BY NAT: NOT DETECTED
SAO2 % BLDV FROM PO2: 23 %
SHIGA TOXIN 1 GENE BY NAT: NOT DETECTED
SHIGA TOXIN 2 GENE BY NAT: NOT DETECTED
SHIGELLA SP+EIEC IPAH STL QL NAA+PROBE: NOT DETECTED
SODIUM SERPL-SCNC: 135 MMOL/L (ref 133–144)
SP GR UR STRIP: 1.02 (ref 1–1.03)
SPECIMEN SOURCE: NORMAL
SQUAMOUS #/AREA URNS AUTO: 1 /HPF (ref 0–1)
TROPONIN I SERPL-MCNC: NORMAL UG/L (ref 0–0.04)
URN SPEC COLLECT METH UR: ABNORMAL
UROBILINOGEN UR STRIP-MCNC: 0 MG/DL (ref 0–2)
VIBRIO GROUP BY NAT: NOT DETECTED
WBC # BLD AUTO: 16.3 10E9/L (ref 4–11)
WBC #/AREA URNS AUTO: 2 /HPF (ref 0–2)
YERSINIA ENTEROCOLITICA BY NAT: NOT DETECTED

## 2017-06-22 PROCEDURE — A9270 NON-COVERED ITEM OR SERVICE: HCPCS | Mod: GY | Performed by: INTERNAL MEDICINE

## 2017-06-22 PROCEDURE — 81001 URINALYSIS AUTO W/SCOPE: CPT | Performed by: PHYSICIAN ASSISTANT

## 2017-06-22 PROCEDURE — 82803 BLOOD GASES ANY COMBINATION: CPT

## 2017-06-22 PROCEDURE — 99223 1ST HOSP IP/OBS HIGH 75: CPT | Mod: AI | Performed by: INTERNAL MEDICINE

## 2017-06-22 PROCEDURE — 99285 EMERGENCY DEPT VISIT HI MDM: CPT | Mod: 25

## 2017-06-22 PROCEDURE — 12000000 ZZH R&B MED SURG/OB

## 2017-06-22 PROCEDURE — 93005 ELECTROCARDIOGRAM TRACING: CPT

## 2017-06-22 PROCEDURE — 87506 IADNA-DNA/RNA PROBE TQ 6-11: CPT | Performed by: PHYSICIAN ASSISTANT

## 2017-06-22 PROCEDURE — 84484 ASSAY OF TROPONIN QUANT: CPT | Performed by: PHYSICIAN ASSISTANT

## 2017-06-22 PROCEDURE — 25000132 ZZH RX MED GY IP 250 OP 250 PS 637: Mod: GY | Performed by: PHYSICIAN ASSISTANT

## 2017-06-22 PROCEDURE — A9270 NON-COVERED ITEM OR SERVICE: HCPCS | Mod: GY | Performed by: PHYSICIAN ASSISTANT

## 2017-06-22 PROCEDURE — 87493 C DIFF AMPLIFIED PROBE: CPT | Performed by: PHYSICIAN ASSISTANT

## 2017-06-22 PROCEDURE — 25000132 ZZH RX MED GY IP 250 OP 250 PS 637: Mod: GY | Performed by: INTERNAL MEDICINE

## 2017-06-22 PROCEDURE — 96361 HYDRATE IV INFUSION ADD-ON: CPT

## 2017-06-22 PROCEDURE — 85025 COMPLETE CBC W/AUTO DIFF WBC: CPT | Performed by: PHYSICIAN ASSISTANT

## 2017-06-22 PROCEDURE — 96360 HYDRATION IV INFUSION INIT: CPT

## 2017-06-22 PROCEDURE — 25000128 H RX IP 250 OP 636: Performed by: PHYSICIAN ASSISTANT

## 2017-06-22 PROCEDURE — 83605 ASSAY OF LACTIC ACID: CPT

## 2017-06-22 PROCEDURE — 80048 BASIC METABOLIC PNL TOTAL CA: CPT | Performed by: PHYSICIAN ASSISTANT

## 2017-06-22 PROCEDURE — 25000128 H RX IP 250 OP 636: Performed by: INTERNAL MEDICINE

## 2017-06-22 RX ORDER — GABAPENTIN 300 MG/1
300 CAPSULE ORAL 3 TIMES DAILY
Status: DISCONTINUED | OUTPATIENT
Start: 2017-06-22 | End: 2017-06-24 | Stop reason: HOSPADM

## 2017-06-22 RX ORDER — ONDANSETRON 2 MG/ML
4 INJECTION INTRAMUSCULAR; INTRAVENOUS EVERY 6 HOURS PRN
Status: DISCONTINUED | OUTPATIENT
Start: 2017-06-22 | End: 2017-06-24 | Stop reason: HOSPADM

## 2017-06-22 RX ORDER — NYSTATIN 100000/ML
500000 SUSPENSION, ORAL (FINAL DOSE FORM) ORAL 4 TIMES DAILY
Status: DISCONTINUED | OUTPATIENT
Start: 2017-06-22 | End: 2017-06-24 | Stop reason: HOSPADM

## 2017-06-22 RX ORDER — HYDROXYZINE HYDROCHLORIDE 25 MG/1
25 TABLET, FILM COATED ORAL EVERY 4 HOURS PRN
Status: DISCONTINUED | OUTPATIENT
Start: 2017-06-22 | End: 2017-06-24 | Stop reason: HOSPADM

## 2017-06-22 RX ORDER — POTASSIUM CHLORIDE 1.5 G/1.58G
20-40 POWDER, FOR SOLUTION ORAL
Status: DISCONTINUED | OUTPATIENT
Start: 2017-06-22 | End: 2017-06-24 | Stop reason: HOSPADM

## 2017-06-22 RX ORDER — HYDROCODONE BITARTRATE AND ACETAMINOPHEN 5; 325 MG/1; MG/1
1 TABLET ORAL EVERY 4 HOURS PRN
Status: DISCONTINUED | OUTPATIENT
Start: 2017-06-22 | End: 2017-06-24 | Stop reason: HOSPADM

## 2017-06-22 RX ORDER — METOPROLOL SUCCINATE 25 MG/1
25 TABLET, EXTENDED RELEASE ORAL DAILY
Status: DISCONTINUED | OUTPATIENT
Start: 2017-06-23 | End: 2017-06-24 | Stop reason: HOSPADM

## 2017-06-22 RX ORDER — HYDROCODONE BITARTRATE AND ACETAMINOPHEN 5; 325 MG/1; MG/1
1 TABLET ORAL ONCE
Status: COMPLETED | OUTPATIENT
Start: 2017-06-22 | End: 2017-06-22

## 2017-06-22 RX ORDER — PROCHLORPERAZINE 25 MG
12.5 SUPPOSITORY, RECTAL RECTAL EVERY 12 HOURS PRN
Status: DISCONTINUED | OUTPATIENT
Start: 2017-06-22 | End: 2017-06-24 | Stop reason: HOSPADM

## 2017-06-22 RX ORDER — CLONIDINE HYDROCHLORIDE 0.1 MG/1
0.1 TABLET ORAL 2 TIMES DAILY
Status: DISCONTINUED | OUTPATIENT
Start: 2017-06-22 | End: 2017-06-24 | Stop reason: HOSPADM

## 2017-06-22 RX ORDER — PROCHLORPERAZINE MALEATE 5 MG
5 TABLET ORAL EVERY 6 HOURS PRN
Status: DISCONTINUED | OUTPATIENT
Start: 2017-06-22 | End: 2017-06-24 | Stop reason: HOSPADM

## 2017-06-22 RX ORDER — MAGNESIUM SULFATE HEPTAHYDRATE 40 MG/ML
4 INJECTION, SOLUTION INTRAVENOUS EVERY 4 HOURS PRN
Status: DISCONTINUED | OUTPATIENT
Start: 2017-06-22 | End: 2017-06-24 | Stop reason: HOSPADM

## 2017-06-22 RX ORDER — NALOXONE HYDROCHLORIDE 0.4 MG/ML
.1-.4 INJECTION, SOLUTION INTRAMUSCULAR; INTRAVENOUS; SUBCUTANEOUS
Status: DISCONTINUED | OUTPATIENT
Start: 2017-06-22 | End: 2017-06-24 | Stop reason: HOSPADM

## 2017-06-22 RX ORDER — SODIUM CHLORIDE 9 MG/ML
1000 INJECTION, SOLUTION INTRAVENOUS CONTINUOUS
Status: DISCONTINUED | OUTPATIENT
Start: 2017-06-22 | End: 2017-06-22

## 2017-06-22 RX ORDER — AMLODIPINE BESYLATE 10 MG/1
10 TABLET ORAL DAILY
Status: DISCONTINUED | OUTPATIENT
Start: 2017-06-23 | End: 2017-06-24 | Stop reason: HOSPADM

## 2017-06-22 RX ORDER — ASPIRIN 81 MG/1
81 TABLET ORAL DAILY
Status: DISCONTINUED | OUTPATIENT
Start: 2017-06-23 | End: 2017-06-24 | Stop reason: HOSPADM

## 2017-06-22 RX ORDER — POTASSIUM CHLORIDE 1500 MG/1
20-40 TABLET, EXTENDED RELEASE ORAL
Status: DISCONTINUED | OUTPATIENT
Start: 2017-06-22 | End: 2017-06-24 | Stop reason: HOSPADM

## 2017-06-22 RX ORDER — SODIUM CHLORIDE 9 MG/ML
INJECTION, SOLUTION INTRAVENOUS CONTINUOUS
Status: DISCONTINUED | OUTPATIENT
Start: 2017-06-22 | End: 2017-06-23

## 2017-06-22 RX ORDER — ONDANSETRON 4 MG/1
4 TABLET, ORALLY DISINTEGRATING ORAL EVERY 6 HOURS PRN
Status: DISCONTINUED | OUTPATIENT
Start: 2017-06-22 | End: 2017-06-24 | Stop reason: HOSPADM

## 2017-06-22 RX ORDER — POTASSIUM CHLORIDE 7.45 MG/ML
10 INJECTION INTRAVENOUS
Status: DISCONTINUED | OUTPATIENT
Start: 2017-06-22 | End: 2017-06-24 | Stop reason: HOSPADM

## 2017-06-22 RX ORDER — ISOSORBIDE DINITRATE 10 MG/1
10 TABLET ORAL 3 TIMES DAILY
Status: DISCONTINUED | OUTPATIENT
Start: 2017-06-22 | End: 2017-06-24 | Stop reason: HOSPADM

## 2017-06-22 RX ORDER — POTASSIUM CHLORIDE 29.8 MG/ML
20 INJECTION INTRAVENOUS
Status: DISCONTINUED | OUTPATIENT
Start: 2017-06-22 | End: 2017-06-24 | Stop reason: HOSPADM

## 2017-06-22 RX ORDER — ACETAMINOPHEN 325 MG/1
650 TABLET ORAL EVERY 4 HOURS PRN
Status: DISCONTINUED | OUTPATIENT
Start: 2017-06-22 | End: 2017-06-24 | Stop reason: HOSPADM

## 2017-06-22 RX ORDER — ESTRADIOL 0.1 MG/G
2 CREAM VAGINAL
Status: DISCONTINUED | OUTPATIENT
Start: 2017-06-23 | End: 2017-06-24 | Stop reason: HOSPADM

## 2017-06-22 RX ORDER — POTASSIUM CL/LIDO/0.9 % NACL 10MEQ/0.1L
10 INTRAVENOUS SOLUTION, PIGGYBACK (ML) INTRAVENOUS
Status: DISCONTINUED | OUTPATIENT
Start: 2017-06-22 | End: 2017-06-24 | Stop reason: HOSPADM

## 2017-06-22 RX ADMIN — NYSTATIN 500000 UNITS: 100000 SUSPENSION ORAL at 17:39

## 2017-06-22 RX ADMIN — HYDROCODONE BITARTRATE AND ACETAMINOPHEN 1 TABLET: 5; 325 TABLET ORAL at 15:42

## 2017-06-22 RX ADMIN — SODIUM CHLORIDE: 9 INJECTION, SOLUTION INTRAVENOUS at 17:31

## 2017-06-22 RX ADMIN — CLONIDINE HYDROCHLORIDE 0.1 MG: 0.1 TABLET ORAL at 21:14

## 2017-06-22 RX ADMIN — HYDROCODONE BITARTRATE AND ACETAMINOPHEN 1 TABLET: 5; 325 TABLET ORAL at 19:57

## 2017-06-22 RX ADMIN — NYSTATIN 500000 UNITS: 100000 SUSPENSION ORAL at 21:14

## 2017-06-22 RX ADMIN — GABAPENTIN 300 MG: 300 CAPSULE ORAL at 21:14

## 2017-06-22 RX ADMIN — ISOSORBIDE DINITRATE 10 MG: 10 TABLET ORAL at 21:14

## 2017-06-22 RX ADMIN — GABAPENTIN 300 MG: 300 CAPSULE ORAL at 17:39

## 2017-06-22 RX ADMIN — SODIUM CHLORIDE 1000 ML: 9 INJECTION, SOLUTION INTRAVENOUS at 11:39

## 2017-06-22 ASSESSMENT — ENCOUNTER SYMPTOMS
WEAKNESS: 1
BLOOD IN STOOL: 0
DIARRHEA: 1
FEVER: 0
NAUSEA: 1
ABDOMINAL PAIN: 1
FREQUENCY: 1
VOMITING: 1
BACK PAIN: 0
DIZZINESS: 0
LIGHT-HEADEDNESS: 1
APPETITE CHANGE: 1

## 2017-06-22 ASSESSMENT — PAIN DESCRIPTION - DESCRIPTORS
DESCRIPTORS: ACHING
DESCRIPTORS: ACHING

## 2017-06-22 NOTE — PHARMACY-ADMISSION MEDICATION HISTORY
Admission medication history interview status for this patient is complete. See [facility] admission navigator for allergy information, prior to admission medications and immunization status.     Medication history interview source(s):Patient  Medication history resources (including written lists, pill bottles, clinic record):None    Changes made to PTA medication list:  Added: none  Deleted: none  Changed: none    Actions taken by pharmacist (provider contacted, etc):None     Additional medication history information: patient was dc and re-admitted less than 72 hrs ago    Medication reconciliation/reorder completed by provider prior to medication history? No    For patients on insulin therapy: no (Yes/No)   Lantus/levemir/NPH/Mix 70/30 dose: _____ in AM/PM or twice daily   Sliding scale Novolog Y/N   If Yes, do you have a baseline novolog pre-meal dose: ______units with meals   Patients eat three meals a day: Y/N   Any Barriers to therapy: cost of medications/comfortable with giving injections (if applicable)/ comfortable and confident with current diabetes regimen       Prior to Admission medications    Medication Sig Last Dose Taking? Auth Provider   HYDROcodone-acetaminophen (NORCO) 5-325 MG per tablet Take 1 tablet by mouth every 4 hours as needed for moderate to severe pain 6/22/2017 at Unknown time Yes Brittnee Petersen DO   hydrOXYzine (ATARAX) 25 MG tablet Take 1 tablet (25 mg) by mouth every 4 hours as needed (pain)  Yes Brittnee Petersen DO   ciprofloxacin (CIPRO) 250 MG tablet Take 1 tablet (250 mg) by mouth 2 times daily 6/22/2017 at am Yes Brittnee Petersen DO   nystatin (MYCOSTATIN) 552138 UNIT/ML suspension Swish and spit 5 mLs (500,000 Units) in mouth 4 times daily for 9 days 6/22/2017 at am Yes Brittnee Petersen DO   estradiol (ESTRACE) 0.1 MG/GM cream Place 2 g vaginally three times a week paraben-free need refill Yes Brittnee Petersen DO   Cholecalciferol  (VITAMIN D3) 2000 UNITS CAPS Take 2,000 Units by mouth daily (with dinner) 6/21/2017 at Unknown time Yes Unknown, Entered By History   Multiple Vitamins-Minerals (PRESERVISION AREDS) CAPS Take 1 capsule by mouth 2 times daily 6/22/2017 at am Yes Unknown, Entered By History   cyanocobalamin (VITAMIN  B-12) 1000 MCG tablet Take 1,000 mcg by mouth every morning 6/21/2017 at Unknown time Yes Unknown, Entered By History   metoprolol (TOPROL-XL) 100 MG 24 hr tablet TAKE ONE TABLET BY MOUTH DAILY 6/22/2017 at am Yes Marcy Soares PA-C   aspirin EC 81 MG EC tablet Take 1 tablet (81 mg) by mouth daily 6/22/2017 at Unknown time Yes Andre Mcmanus MD   isosorbide dinitrate (ISORDIL) 10 MG tablet Take 1 tablet (10 mg) by mouth 3 times daily 6/22/2017 at am Yes Andre Mcmanus MD   amLODIPine (NORVASC) 10 MG tablet Take 1 tablet (10 mg) by mouth daily 6/22/2017 at Unknown time Yes Andre Mcmanus MD   gabapentin (NEURONTIN) 300 MG capsule TAKE ONE CAPSULE BY MOUTH THREE TIMES A DAY 6/22/2017 at am Yes Marcy Soares PA-C   sertraline (ZOLOFT) 50 MG tablet Take 1 tablet (50 mg) by mouth daily 6/22/2017 at am Yes Marcy Soares PA-C   lisinopril (PRINIVIL/ZESTRIL) 40 MG tablet Take 1 tablet (40 mg) by mouth daily 6/22/2017 at am Yes Marcy Soares PA-C   cloNIDine (CATAPRES) 0.1 MG tablet Take 1 tablet (0.1 mg) by mouth 2 times daily 6/22/2017 at am Yes Marcy Soares PA-C

## 2017-06-22 NOTE — IP AVS SNAPSHOT
"[place] [address] MEDICAL SURGICAL: [phone]                                              INTERAGENCY TRANSFER FORM - PHYSICIAN ORDERS   2017                    Hospital Admission Date: 2017  MAGALY MCDANIELS   : 10/11/1930  Sex: Female        Attending Provider: Amanda Kenyon MD     Allergies:  Atorvastatin, Macrobid [Nitrofurantoin Anhydrous], Penicillins, Sulfa Drugs    Infection:  None   Service:  GENERAL MEDI    Ht:  1.6 m (5' 3\")   Wt:  56.7 kg (125 lb)   Admission Wt:  56.7 kg (125 lb)    BMI:  22.14 kg/m 2   BSA:  1.59 m 2            Patient PCP Information     Provider PCP Type    Marcy Soares PA-C, [facility]      ED Clinical Impression     Diagnosis Description Comment Added By Time Added    Weakness [R53.1] Weakness [R53.1]  Zeenat Arvizu PA-C 2017  1:56 PM    Diarrhea, unspecified type [R19.7] Diarrhea, unspecified type [R19.7]  Zeenat Arvizu PA-C 2017  1:56 PM      Hospital Problems as of 2017              Priority Class Noted POA    Diarrhea Medium  2017 Yes      Non-Hospital Problems as of 2017              Priority Class Noted    Generalized anxiety disorder   2007    Osteoporosis   9/10/2010    Sciatica   9/10/2010    HYPERLIPIDEMIA LDL GOAL <130   10/31/2010    Hip fracture (H)   2011    Advanced directives, counseling/discussion   2011    Vitamin D deficiency   2012    HTN (hypertension)   2012    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 Medium  2014    Hypertension goal BP (blood pressure) < 140/90 Medium  2015    Chronic pain   3/10/2017    NSTEMI (non-ST elevated myocardial infarction) (H) Medium  2017    Urinary tract infection without hematuria Medium  2017    Hip pain Medium  2017    Trochanteric fracture of left femur (H) Medium  2017      Code Status History     Date Active Date Inactive Code Status Order ID Comments User Context    2017  8:10 AM  " Full Code [id number]  Amanda Kenyon MD Outpatient    6/22/2017  5:15 PM 6/24/2017  8:10 AM Full Code [id number]  Amanda Kenyon MD Inpatient    6/21/2017  9:32 AM 6/22/2017  5:15 PM Full Code [id number]  Brittnee Petersen DO Outpatient    6/19/2017  6:27 PM 6/21/2017  9:32 AM Full Code [id number]  Gael Hernández MD Inpatient    6/1/2017 10:43 AM 6/19/2017  6:27 PM Full Code [id number]  Andre Mcmanus MD Outpatient    5/28/2017  4:56 PM 6/1/2017 10:43 AM Full Code [id number]  Shaan Harden MD Inpatient         Medication Review      START taking        Dose / Directions Comments    acetaminophen 325 MG tablet   Commonly known as:  TYLENOL   Used for:  Closed fracture of trochanter of left femur, initial encounter (H)        Dose:  650 mg   Take 2 tablets (650 mg) by mouth every 4 hours as needed for mild pain   Quantity:  100 tablet   Refills:  0        hydrOXYzine 25 MG capsule   Commonly known as:  VISTARIL   Used for:  Closed fracture of trochanter of left femur, initial encounter (H)        Dose:  25 mg   Take 1 capsule (25 mg) by mouth every 4 hours as needed for itching or other (pain adjuvunct)   Quantity:  30 capsule   Refills:  0        loperamide 2 MG capsule   Commonly known as:  IMODIUM   Used for:  Diarrhea, unspecified type        Dose:  2 mg   Take 1 capsule (2 mg) by mouth 4 times daily as needed for diarrhea   Quantity:  20 capsule   Refills:  0          CONTINUE these medications which may have CHANGED, or have new prescriptions. If we are uncertain of the size of tablets/capsules you have at home, strength may be listed as something that might have changed.        Dose / Directions Comments    metoprolol 25 MG 24 hr tablet   Commonly known as:  TOPROL-XL   This may have changed:  See the new instructions.   Used for:  HTN (hypertension), benign        Dose:  25 mg   Take 1 tablet (25 mg) by mouth daily   Quantity:  30 tablet   Refills:  0          CONTINUE these medications  which have NOT CHANGED        Dose / Directions Comments    amLODIPine 10 MG tablet   Commonly known as:  NORVASC   Used for:  Hypertension goal BP (blood pressure) < 140/90        Dose:  10 mg   Take 1 tablet (10 mg) by mouth daily   Quantity:  30 tablet   Refills:  3        aspirin 81 MG EC tablet   Used for:  Non-STEMI (non-ST elevated myocardial infarction) (H)        Dose:  81 mg   Take 1 tablet (81 mg) by mouth daily   Quantity:  30 tablet   Refills:  3        cloNIDine 0.1 MG tablet   Commonly known as:  CATAPRES   Used for:  Essential hypertension        Dose:  0.1 mg   Take 1 tablet (0.1 mg) by mouth 2 times daily   Quantity:  180 tablet   Refills:  2        cyanocobalamin 1000 MCG tablet   Commonly known as:  vitamin  B-12        Dose:  1000 mcg   Take 1,000 mcg by mouth every morning   Refills:  0        estradiol 0.1 MG/GM cream   Commonly known as:  ESTRACE   Used for:  Symptomatic menopausal or female climacteric states   Notes to Patient:  Mon, Wed, Fri schedule         Dose:  2 g   Place 2 g vaginally three times a week paraben-free   Quantity:  6 g   Refills:  1        gabapentin 300 MG capsule   Commonly known as:  NEURONTIN   Used for:  Neuropathy (H)        TAKE ONE CAPSULE BY MOUTH THREE TIMES A DAY   Quantity:  180 capsule   Refills:  1        HYDROcodone-acetaminophen 5-325 MG per tablet   Commonly known as:  NORCO   Used for:  Hip pain, left        Dose:  1 tablet   Take 1 tablet by mouth every 4 hours as needed for moderate to severe pain   Quantity:  20 tablet   Refills:  0        hydrOXYzine 25 MG tablet   Commonly known as:  ATARAX   Used for:  Hip pain, left        Dose:  25 mg   Take 1 tablet (25 mg) by mouth every 4 hours as needed (pain)   Quantity:  20 tablet   Refills:  0        isosorbide dinitrate 10 MG tablet   Commonly known as:  ISORDIL   Used for:  Non-STEMI (non-ST elevated myocardial infarction) (H)        Dose:  10 mg   Take 1 tablet (10 mg) by mouth 3 times daily    Quantity:  90 tablet   Refills:  3        lisinopril 40 MG tablet   Commonly known as:  PRINIVIL/ZESTRIL   Used for:  Essential hypertension        Dose:  40 mg   Take 1 tablet (40 mg) by mouth daily   Quantity:  90 tablet   Refills:  1        nystatin 877132 UNIT/ML suspension   Commonly known as:  MYCOSTATIN   Used for:  Oral thrush        Dose:  070804 Units   Swish and spit 5 mLs (500,000 Units) in mouth 4 times daily for 9 days   Quantity:  180 mL   Refills:  0        PRESERVISION AREDS Caps        Dose:  1 capsule   Take 1 capsule by mouth 2 times daily   Refills:  0        sertraline 50 MG tablet   Commonly known as:  ZOLOFT   Used for:  Generalized anxiety disorder        Dose:  50 mg   Take 1 tablet (50 mg) by mouth daily   Quantity:  90 tablet   Refills:  1        vitamin D3 2000 UNITS Caps        Dose:  2000 Units   Take 2,000 Units by mouth daily (with dinner)   Refills:  0          STOP taking     ciprofloxacin 250 MG tablet   Commonly known as:  CIPRO                   Summary of Visit     Reason for your hospital stay       You were hospitalized for diarrhea and weakness.             After Care     Activity - Up with assistive device           Advance Diet as Tolerated       Follow this diet upon discharge: Orders Placed This Encounter      Room Service      Advance Diet as Tolerated: Regular Diet Adult       General info for SNF       Length of Stay Estimate: Short Term Care: Estimated # of Days <30  Condition at Discharge: Improving  Level of care:skilled   Rehabilitation Potential: Good  Admission H&P remains valid and up-to-date: Yes  Recent Chemotherapy: N/A  Use Nursing Home Standing Orders: Yes       Mantoux instructions       Give two-step Mantoux (PPD) Per Facility Policy Yes       Patient care order       CBC should be done in 2 weeks to monitor Hgb.             Referrals     Occupational Therapy Adult Consult       Evaluate and treat as clinically indicated.    Reason:  Diffuse weakness        Physical Therapy Adult Consult       Evaluate and treat as clinically indicated.    Reason:  Diffuse weakness, left non displaced hip fracture (non operative)             Follow-Up Appointment Instructions     Future Labs/Procedures    Follow Up and recommended labs and tests     Comments:    Follow up with orthopedics in clinic in 2 weeks to follow up on the hip fracture.      Follow-Up Appointment Instructions     Follow Up and recommended labs and tests       Follow up with orthopedics in clinic in 2 weeks to follow up on the hip fracture.             Statement of Approval     Ordered          06/24/17 0811  I have reviewed and agree with all the recommendations and orders detailed in this document.  EFFECTIVE NOW     Approved and electronically signed by:  Amanda Kenyon MD

## 2017-06-22 NOTE — IP AVS SNAPSHOT
"          [place] [address] MEDICAL SURGICAL: [phone]                                              INTERAGENCY TRANSFER FORM - LAB / IMAGING / EKG / EMG RESULTS   2017                    Hospital Admission Date: 2017  MAGALY MCDANIELS   : 10/11/1930  Sex: Female        Attending Provider: Amanda Kenyon MD     Allergies:  Atorvastatin, Macrobid [Nitrofurantoin Anhydrous], Penicillins, Sulfa Drugs    Infection:  None   Service:  GENERAL MEDI    Ht:  1.6 m (5' 3\")   Wt:  56.7 kg (125 lb)   Admission Wt:  56.7 kg (125 lb)    BMI:  22.14 kg/m 2   BSA:  1.59 m 2            Patient PCP Information     Provider PCP Type    Marcy Soares PA-C, [facility]         Lab Results - 3 Days      Basic metabolic panel [[id number]] (Abnormal)  Resulted: 17 0738, Result status: Final result    Ordering provider: Amanda Kenyon MD  17 0000 Resulting lab: [facility]    Specimen Information    Type Source Collected On   Blood  17          Components       Value Reference Range Flag Lab   Sodium 140 133 - 144 mmol/L  FrRdHs   Potassium 3.6 3.4 - 5.3 mmol/L  FrRdHs   Chloride 109 94 - 109 mmol/L  FrRdHs   Carbon Dioxide 22 20 - 32 mmol/L  FrRdHs   Anion Gap 9 3 - 14 mmol/L  FrRdHs   Glucose 85 70 - 99 mg/dL  FrRdHs   Urea Nitrogen 19 7 - 30 mg/dL  FrRdHs   Creatinine 0.56 0.52 - 1.04 mg/dL  FrRdHs   GFR Estimate -- >60 mL/min/1.7m2  FrRdHs   Result:         >90  Non  GFR Calc     GFR Estimate If Black -- >60 mL/min/1.7m2  FrRd   Result:         >90   GFR Calc     Calcium 8.1 8.5 - 10.1 mg/dL L FrRd   Result:              CBC with platelets [[id number]] (Abnormal)  Resulted: 17 0725, Result status: Final result    Ordering provider: Amanda Kenyon MD  17 0000 Resulting lab: [facility]    Specimen Information    Type Source Collected On   Blood  17          Components       Value Reference Range " Flag Lab   WBC 6.8 4.0 - 11.0 10e9/L  FrRdHs   RBC Count 2.76 3.8 - 5.2 10e12/L L FrRdHs   Hemoglobin 8.4 11.7 - 15.7 g/dL L FrRdHs   Hematocrit 25.9 35.0 - 47.0 % L FrRdHs   MCV 94 78 - 100 fl  FrRdHs   MCH 30.4 26.5 - 33.0 pg  FrRdHs   MCHC 32.4 31.5 - 36.5 g/dL  FrRdHs   RDW 15.6 10.0 - 15.0 % H FrRdHs   Platelet Count 118 150 - 450 10e9/L L FrRdHs            Potassium [[id number]]  Resulted: 06/24/17 0255, Result status: Final result    Ordering provider: Amanda Kenyon MD  06/23/17 2249 Resulting lab: [facility]    Specimen Information    Type Source Collected On   Blood  06/24/17 0222          Components       Value Reference Range Flag Lab   Potassium 4.0 3.4 - 5.3 mmol/L  FrRdHs            CBC with platelets [[id number]] (Abnormal)  Resulted: 06/23/17 0813, Result status: Final result    Ordering provider: Amanda Kenyon MD  06/23/17 0001 Resulting lab: [facility]    Specimen Information    Type Source Collected On   Blood  06/23/17 0700          Components       Value Reference Range Flag Lab   WBC 9.0 4.0 - 11.0 10e9/L  FrRdHs   RBC Count 2.79 3.8 - 5.2 10e12/L L FrRdHs   Hemoglobin 8.7 11.7 - 15.7 g/dL L FrRdHs   Hematocrit 26.1 35.0 - 47.0 % L FrRdHs   MCV 94 78 - 100 fl  FrRdHs   MCH 31.2 26.5 - 33.0 pg  FrRdHs   MCHC 33.3 31.5 - 36.5 g/dL  FrRdHs   RDW 15.5 10.0 - 15.0 % H FrRdHs   Platelet Count 94 150 - 450 10e9/L L FrRdHs            Basic metabolic panel [[id number]] (Abnormal)  Resulted: 06/23/17 0756, Result status: Final result    Ordering provider: Amanda Kenyon MD  06/23/17 0001 Resulting lab: [facility]    Specimen Information    Type Source Collected On   Blood  06/23/17 0700          Components       Value Reference Range Flag Lab   Sodium 139 133 - 144 mmol/L  FrRdHs   Potassium 3.3 3.4 - 5.3 mmol/L L FrRdHs   Chloride 109 94 - 109 mmol/L  FrRdHs   Carbon Dioxide 24 20 - 32 mmol/L  FrRdHs   Anion Gap 6 3 - 14 mmol/L  FrRdHs   Glucose 93 70 -  99 mg/dL  FrRdHs   Urea Nitrogen 32 7 - 30 mg/dL H FrRdHs   Creatinine 0.67 0.52 - 1.04 mg/dL  FrRdHs   GFR Estimate 84 >60 mL/min/1.7m2  FrRdHs   Comment:  Non  GFR Calc   GFR Estimate If Black -- >60 mL/min/1.7m2  FrRdHs   Result:         >90   GFR Calc     Calcium 7.8 8.5 - 10.1 mg/dL L FrRdHs   Result:              Enteric Bacteria and Virus Panel by SAMMY Stool [[id number]]  Resulted: 06/22/17 2311, Result status: Final result    Ordering provider: Zeenat Arvizu PA-C  06/22/17 1138 Resulting lab: [facility]    Specimen Information    Type Source Collected On   Stool  06/22/17 1200          Components       Value Reference Range Flag Lab   Campylobacter group by SAMMY Not Detected NDET  75   Salmonella species by SAMMY Not Detected NDET  75   Shigella species by SAMMY Not Detected NDET  75   Vibrio group by SAMMY Not Detected NDET  75   Rotavirus A by SAMMY Not Detected NDET  75   Shiga toxin 1 gene by SAMMY Not Detected NDET  75   Shiga toxin 2 gene by SAMMY Not Detected NDET  75   Norovirus I and II by SAMMY Not Detected NDET  75   Yersinia enterocolitica by SAMMY Not Detected NDET  75   Enteric pathogen comment --   75   Result:         Testing performed by multiplexed, qualitative PCR using the [employer]   Enteric Pathogens Nucleic Acid Test. Results should not be used as the sole   basis for diagnosis, treatment, or other patient management decisions.   Positive results do not rule out co-infection with other organisms that are not   detected by this test, and may not be the sole or definitive cause of patient   illness.   Negative results in the setting of clinical illness compatible with   gastroenteritis may be due to infection by pathogens that are not detected by   this test or non-infectious causes such as ulcerative colitis, irritable bowel   syndrome, or Crohn's disease.   Note: Shiga toxin producing E. coli (STEC) typically harbor one  or both genes   that encode for Shiga toxins 1 and 2.              Clostridium difficile toxin B PCR [[id number]]  Resulted: 06/22/17 1641, Result status: Final result    Ordering provider: Zeenat Arvizu PA-C  06/22/17 1138 Resulting lab: [facility]    Specimen Information    Type Source Collected On   Stool  06/22/17 1200          Components       Value Reference Range Flag Lab   Specimen Description Feces   FrRdHs   C Diff Toxin B PCR -- NEG  75   Result:         Negative  Negative: Clostridium difficile target DNA sequences NOT detected, presumed   negative for Clostridium difficile toxin B or the number of bacteria present   may be below the limit of detection for the test.   FDA approved assay performed using [employer] GeneXpert real-time PCR.   A negative result does not exclude actual disease due to Clostridium difficile   and may be due to improper collection, handling and storage of the specimen or   the number of organisms in the specimen is below the detection limit of the   assay.              Lactic acid whole blood [[id number]]  Resulted: 06/22/17 1355, Result status: In process    Ordering provider: Zeenat Arvizu PA-C  06/22/17 1348 Resulting lab: [facility]    Specimen Information    Type Source Collected On     06/22/17 1348            UA with Microscopic [[id number]] (Abnormal)  Resulted: 06/22/17 1253, Result status: Final result    Ordering provider: Zeenat Arvizu PA-C  06/22/17 1138 Resulting lab: [facility]    Specimen Information    Type Source Collected On   Urine Urine catheter 06/22/17 1225          Components       Value Reference Range Flag Lab   Color Urine Soo   FrRdHs   Appearance Urine Slightly Cloudy   FrRdHs   Glucose Urine Negative NEG mg/dL  FrRdHs   Bilirubin Urine Negative NEG  FrRdHs   Ketones Urine Negative NEG mg/dL  FrRdHs   Specific Gravity Urine 1.016 1.003 - 1.035  FrRdHs   Blood Urine Negative NEG   FrRdHs   pH Urine 5.0 5.0 - 7.0 pH  FrRdHs   Protein Albumin Urine Negative NEG mg/dL  FrRdHs   Urobilinogen mg/dL 0.0 0.0 - 2.0 mg/dL  FrRdHs   Nitrite Urine Negative NEG  FrRdHs   Leukocyte Esterase Urine Negative NEG  FrRdHs   Source Midstream Urine   FrRdHs   WBC Urine 2 0 - 2 /HPF  FrRdHs   RBC Urine 1 0 - 2 /HPF  FrRdHs   Bacteria Urine Few NEG /HPF A FrRdHs   Squamous Epithelial /HPF Urine 1 0 - 1 /HPF  FrRdHs   Mucous Urine Present NEG /LPF A FrRdHs   Hyaline Casts 17 0 - 2 /LPF H FrRdHs            Basic metabolic panel [[id number]] (Abnormal)  Resulted: 06/22/17 1246, Result status: Final result    Ordering provider: Zeenat Arvizu PA-C  06/22/17 1138 Resulting lab: [facility]    Specimen Information    Type Source Collected On   Blood  06/22/17 1129          Components       Value Reference Range Flag Lab   Sodium 135 133 - 144 mmol/L  FrRdHs   Potassium 3.4 3.4 - 5.3 mmol/L  FrRdHs   Chloride 102 94 - 109 mmol/L  FrRdHs   Carbon Dioxide 24 20 - 32 mmol/L  FrRdHs   Anion Gap 9 3 - 14 mmol/L  FrRdHs   Glucose 127 70 - 99 mg/dL H FrRdHs   Urea Nitrogen 40 7 - 30 mg/dL H FrRdHs   Creatinine 1.10 0.52 - 1.04 mg/dL H FrRdHs   GFR Estimate 47 >60 mL/min/1.7m2 L FrRdHs   Comment:  Non  GFR Calc   GFR Estimate If Black 57 >60 mL/min/1.7m2 L FrRdHs   Comment:  African American GFR Calc   Calcium 8.5 8.5 - 10.1 mg/dL  FrRdHs            Troponin I [[id number]]  Resulted: 06/22/17 1246, Result status: Final result    Ordering provider: Zeenat Arvizu PA-C  06/22/17 1138 Resulting lab: [facility]    Specimen Information    Type Source Collected On   Blood  06/22/17 1129          Components       Value Reference Range Flag Lab   Troponin I ES -- 0.000 - 0.045 ug/L  FrRdHs   Result:         <0.015  The 99th percentile for upper reference range is 0.045 ug/L.  Troponin values in   the range of 0.045 - 0.120 ug/L may be associated with risks of adverse    "clinical events.              CBC with platelets differential [[id number]] (Abnormal)  Resulted: 06/22/17 1219, Result status: Final result    Ordering provider: Zeenat Arvizu PA-C  06/22/17 1138 Resulting lab: [facility]    Specimen Information    Type Source Collected On   Blood  06/22/17 1129          Components       Value Reference Range Flag Lab   WBC 16.3 4.0 - 11.0 10e9/L H FrRdHs   RBC Count 3.29 3.8 - 5.2 10e12/L L FrRdHs   Hemoglobin 10.1 11.7 - 15.7 g/dL L FrRdHs   Hematocrit 30.6 35.0 - 47.0 % L FrRdHs   MCV 93 78 - 100 fl  FrRdHs   MCH 30.7 26.5 - 33.0 pg  FrRdHs   MCHC 33.0 31.5 - 36.5 g/dL  FrRdHs   RDW 15.4 10.0 - 15.0 % H FrRdHs   Platelet Count 144 150 - 450 10e9/L L FrRdHs   Diff Method Automated Method   FrRdHs   % Neutrophils 85.4 %  FrRdHs   % Lymphocytes 7.2 %  FrRdHs   % Monocytes 6.8 %  FrRdHs   % Eosinophils 0.0 %  FrRdHs   % Basophils 0.2 %  FrRdHs   % Immature Granulocytes 0.4 %  FrRdHs   Nucleated RBCs 0 0 /100  FrRdHs   Absolute Neutrophil 13.9 1.6 - 8.3 10e9/L H FrRdHs   Absolute Lymphocytes 1.2 0.8 - 5.3 10e9/L  FrRdHs   Absolute Monocytes 1.1 0.0 - 1.3 10e9/L  FrRdHs   Absolute Eosinophils 0.0 0.0 - 0.7 10e9/L  FrRdHs   Absolute Basophils 0.0 0.0 - 0.2 10e9/L  FrRdHs   Abs Immature Granulocytes 0.1 0 - 0.4 10e9/L  FrRdHs   Absolute Nucleated RBC 0.0   FrRdHs            Testing Performed By     Lab - Abbreviation Name Director Address Valid Date Range    12 - FrRdHs [facility] Unknown [address]  [facility] [address] 05/08/15 1057 - Present    45 - [id number] [facility] Unknown Unknown 01/28/02 0000 - Present    75 - Unknown [facility] Unknown [address] [facility] [address] 01/15/15 1019 - Present            Unresulted Labs (24h ago through future)    Start       Ordered    Unscheduled  Potassium  (Potassium Replacement - \"Standard\" - For K levels less than 3.4 mmol/L - UU,UR,UA,RH,SH,PH,WY )  CONDITIONAL " "(SPECIFY),   Routine     Comments:  Obtain Potassium Level for these conditions:  *IF no potassium result within 24 hours before initiation of order set, draw potassium level with next lab collect.    *2 HOURS AFTER last IV potassium replacement dose and 4 hours after an oral replacement dose.  *Next morning after potassium dose.     Repeat Potassium Replacement if necessary.    06/22/17 1715    Unscheduled  Magnesium  (Magnesium Replacement -  Adult - \"Standard\" - Replacement for all levels less than 1.6 mg/dL )  CONDITIONAL (SPECIFY),   Routine     Comments:  Obtain Magnesium Level for these conditions:  *IF no magnesium result within 24 hrs before initiation of order set, draw magnesium level with next lab collect.    *2 HOURS AFTER last magnesium replacement dose when magnesium replacement given for level less than 1.6   *Next morning after magnesium dose.     Repeat Magnesium Replacement if necessary.    06/22/17 1715      Encounter-Level Documents:     There are no encounter-level documents.      Order-Level Documents:     There are no order-level documents.      "

## 2017-06-22 NOTE — IP AVS SNAPSHOT
MRN:[id number]                      After Visit Summary   6/22/2017    Daya Ignacio    MRN: [id number]           Thank you!     Thank you for choosing [facility] for your care. Our goal is always to provide you with excellent care. Hearing back from our patients is one way we can continue to improve our services. Please take a few minutes to complete the written survey that you may receive in the mail after you visit. If you would like to speak to someone directly about your visit please contact Patient Relations at [phone]. Thank you!          Patient Information     Date Of Birth          10/11/1930        About your hospital stay     You were admitted on:  June 22, 2017 You last received care in the:  [place] [address] [facility]    You were discharged on:  June 24, 2017        Reason for your hospital stay       You were hospitalized for diarrhea and weakness.                  Who to Call     For medical emergencies, please call 911.  For non-urgent questions about your medical care, please call your primary care provider or clinic, [phone]          Attending Provider     Provider Specialty    Zeenat Arvizu PA-C Physician Assistant    Amanda Kenyon MD Internal Medicine       Primary Care Provider Office Phone # Fax #    Marcy Natalia Soares PA-C [phone]      After Care Instructions     Activity - Up with assistive device           Advance Diet as Tolerated       Follow this diet upon discharge: Orders Placed This Encounter      Room Service      Advance Diet as Tolerated: Regular Diet Adult            General info for SNF       Length of Stay Estimate: Short Term Care: Estimated # of Days <30  Condition at Discharge: Improving  Level of care:skilled   Rehabilitation Potential: Good  Admission H&P remains valid and up-to-date: Yes  Recent Chemotherapy: N/A  Use Nursing Home Standing Orders: Yes            Mantoux instructions    "    Give two-step Mantoux (PPD) Per Facility Policy Yes            Patient care order       CBC should be done in 2 weeks to monitor Hgb.                  Follow-up Appointments     Follow Up and recommended labs and tests       Follow up with orthopedics in clinic in 2 weeks to follow up on the hip fracture.                  Additional Services     Occupational Therapy Adult Consult       Evaluate and treat as clinically indicated.    Reason:  Diffuse weakness            Physical Therapy Adult Consult       Evaluate and treat as clinically indicated.    Reason:  Diffuse weakness, left non displaced hip fracture (non operative)                  Pending Results     No orders found from 6/20/2017 to 6/23/2017.            Statement of Approval     Ordered          06/24/17 0811  I have reviewed and agree with all the recommendations and orders detailed in this document.  EFFECTIVE NOW     Approved and electronically signed by:  Amanda Kenyon MD             Admission Information     Date & Time Provider Department Dept. Phone    6/22/2017 Amanda Kenyon MD [place] [address] [facility] [phone]      Your Vitals Were     Blood Pressure Pulse Temperature Respirations Height Weight    154/63 (BP Location: Left arm) 65 98.1  F (36.7  C) (Oral) 15 1.6 m (5' 3\") 56.7 kg (125 lb)    Pulse Oximetry BMI (Body Mass Index)                92% 22.14 kg/m2          MyChart Information     [employer] lets you send messages to your doctor, view your test results, renew your prescriptions, schedule appointments and more. To sign up, go to [email or web address] . Click on \"Log in\" on the left side of the screen, which will take you to the Welcome page. Then click on \"Sign up Now\" on the right side of the page.     You will be asked to enter the access code listed below, as well as some personal information. Please follow the directions to create your username and password.     Your access code is: [email or web address]  Expires: " 2017 10:49 AM     Your access code will  in 90 days. If you need help or a new code, please call your [place] clinic or [phone].        Care EveryWhere ID     This is your Care EveryWhere ID. This could be used by other organizations to access your [place] medical records  [id number]        Equal Access to Services     MARIZOL AMOR : Hadii piper villatoro hadtalishao Sodileepali, waaxda luqadaha, qaybta kaalmada jose enriquebettyda, kinga hernandezmerlynmodesta olvera . So [facility] [phone].    ATENCIÓN: Si habla español, tiene a cohen disposición servicios gratuitos de asistencia lingüística. Violette al [phone].    We comply with applicable federal civil rights laws and [place] laws. We do not discriminate on the basis of race, color, national origin, age, disability sex, sexual orientation or gender identity.               Review of your medicines      START taking        Dose / Directions    acetaminophen 325 MG tablet   Commonly known as:  TYLENOL   Used for:  Closed fracture of trochanter of left femur, initial encounter (H)        Dose:  650 mg   Take 2 tablets (650 mg) by mouth every 4 hours as needed for mild pain   Quantity:  100 tablet   Refills:  0       hydrOXYzine 25 MG capsule   Commonly known as:  VISTARIL   Used for:  Closed fracture of trochanter of left femur, initial encounter (H)        Dose:  25 mg   Take 1 capsule (25 mg) by mouth every 4 hours as needed for itching or other (pain adjuvunct)   Quantity:  30 capsule   Refills:  0       loperamide 2 MG capsule   Commonly known as:  IMODIUM   Used for:  Diarrhea, unspecified type        Dose:  2 mg   Take 1 capsule (2 mg) by mouth 4 times daily as needed for diarrhea   Quantity:  20 capsule   Refills:  0         CONTINUE these medicines which may have CHANGED, or have new prescriptions. If we are uncertain of the size of tablets/capsules you have at home, strength may be listed as something that might have changed.        Dose / Directions     metoprolol 25 MG 24 hr tablet   Commonly known as:  TOPROL-XL   This may have changed:  See the new instructions.   Used for:  HTN (hypertension), benign        Dose:  25 mg   Take 1 tablet (25 mg) by mouth daily   Quantity:  30 tablet   Refills:  0         CONTINUE these medicines which have NOT CHANGED        Dose / Directions    amLODIPine 10 MG tablet   Commonly known as:  NORVASC   Used for:  Hypertension goal BP (blood pressure) < 140/90        Dose:  10 mg   Take 1 tablet (10 mg) by mouth daily   Quantity:  30 tablet   Refills:  3       aspirin 81 MG EC tablet   Used for:  Non-STEMI (non-ST elevated myocardial infarction) (H)        Dose:  81 mg   Take 1 tablet (81 mg) by mouth daily   Quantity:  30 tablet   Refills:  3       cloNIDine 0.1 MG tablet   Commonly known as:  CATAPRES   Used for:  Essential hypertension        Dose:  0.1 mg   Take 1 tablet (0.1 mg) by mouth 2 times daily   Quantity:  180 tablet   Refills:  2       cyanocobalamin 1000 MCG tablet   Commonly known as:  vitamin  B-12        Dose:  1000 mcg   Take 1,000 mcg by mouth every morning   Refills:  0       estradiol 0.1 MG/GM cream   Commonly known as:  ESTRACE   Used for:  Symptomatic menopausal or female climacteric states   Notes to Patient:  Mon, Wed, Fri schedule         Dose:  2 g   Place 2 g vaginally three times a week paraben-free   Quantity:  6 g   Refills:  1       gabapentin 300 MG capsule   Commonly known as:  NEURONTIN   Used for:  Neuropathy (H)        TAKE ONE CAPSULE BY MOUTH THREE TIMES A DAY   Quantity:  180 capsule   Refills:  1       HYDROcodone-acetaminophen 5-325 MG per tablet   Commonly known as:  NORCO   Used for:  Hip pain, left        Dose:  1 tablet   Take 1 tablet by mouth every 4 hours as needed for moderate to severe pain   Quantity:  20 tablet   Refills:  0       hydrOXYzine 25 MG tablet   Commonly known as:  ATARAX   Used for:  Hip pain, left        Dose:  25 mg   Take 1 tablet (25 mg) by mouth every 4 hours  as needed (pain)   Quantity:  20 tablet   Refills:  0       isosorbide dinitrate 10 MG tablet   Commonly known as:  ISORDIL   Used for:  Non-STEMI (non-ST elevated myocardial infarction) (H)        Dose:  10 mg   Take 1 tablet (10 mg) by mouth 3 times daily   Quantity:  90 tablet   Refills:  3       lisinopril 40 MG tablet   Commonly known as:  PRINIVIL/ZESTRIL   Used for:  Essential hypertension        Dose:  40 mg   Take 1 tablet (40 mg) by mouth daily   Quantity:  90 tablet   Refills:  1       nystatin 142474 UNIT/ML suspension   Commonly known as:  MYCOSTATIN   Used for:  Oral thrush        Dose:  657844 Units   Swish and spit 5 mLs (500,000 Units) in mouth 4 times daily for 9 days   Quantity:  180 mL   Refills:  0       PRESERVISION AREDS Caps        Dose:  1 capsule   Take 1 capsule by mouth 2 times daily   Refills:  0       sertraline 50 MG tablet   Commonly known as:  ZOLOFT   Used for:  Generalized anxiety disorder        Dose:  50 mg   Take 1 tablet (50 mg) by mouth daily   Quantity:  90 tablet   Refills:  1       vitamin D3 2000 UNITS Caps        Dose:  2000 Units   Take 2,000 Units by mouth daily (with dinner)   Refills:  0         STOP taking     ciprofloxacin 250 MG tablet   Commonly known as:  CIPRO                Where to get your medicines      Some of these will need a paper prescription and others can be bought over the counter. Ask your nurse if you have questions.     You don't need a prescription for these medications     acetaminophen 325 MG tablet    hydrOXYzine 25 MG capsule    loperamide 2 MG capsule    metoprolol 25 MG 24 hr tablet         Information about where to get these medications is not yet available     ! Ask your nurse or doctor about these medications     HYDROcodone-acetaminophen 5-325 MG per tablet                Protect others around you: Learn how to safely use, store and throw away your medicines at www.disposemymeds.org.             Medication List: This is a list of all  your medications and when to take them. Check marks below indicate your daily home schedule. Keep this list as a reference.      Medications           Morning Afternoon Evening Bedtime As Needed    acetaminophen 325 MG tablet   Commonly known as:  TYLENOL   Take 2 tablets (650 mg) by mouth every 4 hours as needed for mild pain   Last time this was given:  650 mg on 6/24/2017 12:33 AM                                   amLODIPine 10 MG tablet   Commonly known as:  NORVASC   Take 1 tablet (10 mg) by mouth daily   Last time this was given:  10 mg on 6/24/2017  8:18 AM   Next Dose Due:  6/25, morning                                   aspirin 81 MG EC tablet   Take 1 tablet (81 mg) by mouth daily   Last time this was given:  81 mg on 6/24/2017  8:18 AM   Next Dose Due:  6/25, morning                                   cloNIDine 0.1 MG tablet   Commonly known as:  CATAPRES   Take 1 tablet (0.1 mg) by mouth 2 times daily   Last time this was given:  0.1 mg on 6/24/2017  8:19 AM   Next Dose Due:  6/24, evening                                      cyanocobalamin 1000 MCG tablet   Commonly known as:  vitamin  B-12   Take 1,000 mcg by mouth every morning   Next Dose Due:  6/25, morning                                   estradiol 0.1 MG/GM cream   Commonly known as:  ESTRACE   Place 2 g vaginally three times a week paraben-free   Last time this was given:  2 g on 6/23/2017  1:24 PM   Next Dose Due:  6/26   Notes to Patient:  Mon, Wed, Fri schedule                                 gabapentin 300 MG capsule   Commonly known as:  NEURONTIN   TAKE ONE CAPSULE BY MOUTH THREE TIMES A DAY   Last time this was given:  300 mg on 6/24/2017  8:18 AM   Next Dose Due:  6/24, afternoon                                          HYDROcodone-acetaminophen 5-325 MG per tablet   Commonly known as:  NORCO   Take 1 tablet by mouth every 4 hours as needed for moderate to severe pain   Last time this was given:  1 tablet on 6/24/2017  8:35 AM   Next Dose  Due:  No earlier than 12:35pm on 6/24                                   hydrOXYzine 25 MG capsule   Commonly known as:  VISTARIL   Take 1 capsule (25 mg) by mouth every 4 hours as needed for itching or other (pain adjuvunct)                                   hydrOXYzine 25 MG tablet   Commonly known as:  ATARAX   Take 1 tablet (25 mg) by mouth every 4 hours as needed (pain)   Last time this was given:  25 mg on 6/24/2017  8:19 AM   Next Dose Due:  No earlier than 12:20pm on 6/24                                   isosorbide dinitrate 10 MG tablet   Commonly known as:  ISORDIL   Take 1 tablet (10 mg) by mouth 3 times daily   Last time this was given:  10 mg on 6/24/2017  8:18 AM   Next Dose Due:  6/24, afternoon                                          lisinopril 40 MG tablet   Commonly known as:  PRINIVIL/ZESTRIL   Take 1 tablet (40 mg) by mouth daily   Next Dose Due:  6/25, morning                                   loperamide 2 MG capsule   Commonly known as:  IMODIUM   Take 1 capsule (2 mg) by mouth 4 times daily as needed for diarrhea   Last time this was given:  2 mg on 6/24/2017  8:18 AM                                   metoprolol 25 MG 24 hr tablet   Commonly known as:  TOPROL-XL   Take 1 tablet (25 mg) by mouth daily   Last time this was given:  25 mg on 6/24/2017  8:18 AM                                nystatin [id number] UNIT/ML suspension   Commonly known as:  MYCOSTATIN   Swish and spit 5 mLs (500,000 Units) in mouth 4 times daily for 9 days   Last time this was given:  500,000 Units on 6/24/2017  8:19 AM   Next Dose Due:  6/24, 1:00pm                                            PRESERVISION AREDS Caps   Take 1 capsule by mouth 2 times daily                                      sertraline 50 MG tablet   Commonly known as:  ZOLOFT   Take 1 tablet (50 mg) by mouth daily   Last time this was given:  50 mg on 6/24/2017  8:19 AM   Next Dose Due:  6/25, morning                                   vitamin D3 2000 UNITS  Caps   Take 2,000 Units by mouth daily (with dinner)   Next Dose Due:  6/24, with dinner

## 2017-06-22 NOTE — ED NOTES
[facility]  ED Nurse Handoff Report    Daya Ignacio is a 86 year old female   ED Chief complaint: Generalized Weakness  . ED Diagnosis:   Final diagnoses:   Weakness   Diarrhea, unspecified type     Allergies:   Allergies   Allergen Reactions     Atorvastatin Muscle Pain (Myalgia)     Macrobid [Nitrofurantoin Anhydrous] Other (See Comments)     Body aches     Penicillins      Sulfa Drugs        Code Status: Full Code  Activity level - Baseline/Home:  Stand with Assist. Activity Level - Current:   Stand with Assist of 2. Lift room needed: No. Bariatric: No   Needed: No   Isolation: Yes. Infection: Not Applicable  C-Diff Pending.     Vital Signs:   Vitals:    06/22/17 1400 06/22/17 1415 06/22/17 1430 06/22/17 1445   BP: 156/66 153/69 (!) 125/103 (!) 127/101   Pulse:       Resp:       Temp:       TempSrc:       SpO2: 92%   95%   Weight:       Height:           Cardiac Rhythm:  ,      Pain level:    Patient confused: No. Patient Falls Risk: Yes.   Elimination Status: Has voided   Patient Report - Initial Complaint: Generalized weakness. Focused Assessment: Pt was recently discharged from hospital yesterday and then started to have diarrhea/vomiting that evening. Pt was hospitalized for a right hip fracture that they were not going to perform surgery on. Pt also recently being treated with cipro for UTI, with last dose of medication being tomorrow.  Pt states she was unable to get to the bathroom using her walker since she was so weak. Home health nurse felt that she should be seen. Pt denies dizziness, lightheadedness or fevers. Pt has had a couple loose stools down here in the ED.   Tests Performed: Lab work, UA, and stool sample Abnormal Results: Glucose 127, creatinine 1.10, WBC 16.3  Treatments provided: Bag of NS  Family Comments: Spoke to son (Gael) of pt and updated on plan of care  OBS brochure/video discussed/provided to patient:  N/A  ED Medications:   Medications   sodium chloride  (PF) 0.9% PF flush 3 mL (not administered)   sodium chloride (PF) 0.9% PF flush 3 mL (not administered)   0.9% sodium chloride BOLUS (0 mLs Intravenous Stopped 6/22/17 1354)     Followed by   0.9% sodium chloride infusion (not administered)     Drips infusing:  No         ED Nurse Name/Phone Number: Maisha Olsen,   2:50 PM  RECEIVING UNIT ED HANDOFF REVIEW    Above ED Nurse Handoff Report was reviewed: YES  Reviewed by: Kriss Juan on June 22, 2017 at 4:16 PM

## 2017-06-22 NOTE — IP AVS SNAPSHOT
` ` Patient Information     Patient Name Sex     Daya Ignacio ([id number]) Female 10/11/1930       Room Bed    [facility] 05-01      Patient Demographics     Address Phone    [address]   [place] [address] [phone] (Home)  [phone] (Mobile) *Preferred*      Patient Ethnicity & Race     Ethnic Group Patient Race    American White      Emergency Contact(s)     Name Relation Home Work Mobile    Gael Ignacio [phone]      Documents on File        Status Date Received Description       Documents for the Patient    Insurance Card  06     Insurance Card  06     Face Sheet Received () 08     Privacy Notice - [place] Received 06     Insurance Card  07     Consent Form  07     External Medication Information Consent Accepted () 08/10/09     Face Sheet Received () 01/06/10     External Medication Information Consent Accepted () 09/10/10     Patient ID Received 17     Consent for Services - Hospital/Clinic Received () 01/06/10     Consent for Services - Hospital/Clinic Received () 11     Consent for Services - Hospital/Clinic Received () 11     External Medication Information Consent Accepted () 11     Insurance Card Received 12 Medicare    Insurance Card Received 12 Commercial    Consent for Services - Hospital/Clinic Received () 12     External Medication Information Consent Accepted 12     Consent for EHR Access  13 Copied from existing Consent for services - C/HOD collected on 2012    [facility] Specified Other       Consent for Services - Hospital/Clinic Received () 14     HIM CANDIS Authorization  03/27/15 INFORMATION COLLECTION ENTERPRISES    Consent for Services - Hospital/Clinic Received () 06/16/15     Consent for Services/Privacy Notice - Hospital/Clinic Received () 16      Insurance Card Received 05/28/17     Consent for Services/Privacy Notice - Hospital/Clinic Received 05/28/17     Power of  Received (Deleted) 06/19/17     Power of  Received (Deleted) 06/19/17       Admission Information     Attending Provider Admitting Provider Admission Type Admission Date/Time    Amanda Kenyon MD Cox, Anna Amelia, MD Emergency 06/22/17  1103    Discharge Date Hospital Service Auth/Cert Status Service Area     General Medicine [facility] SERVICES    Unit Room/Bed Admission Status        5 MEDICAL SURGICAL 0547/0547-01 Admission (Confirmed)       Admission     Complaint    Diarrhea      Hospital Account     Name Acct ID Class Status Primary Coverage    Daya Ignacio [id number] Inpatient Open MEDICARE - MEDICARE            Guarantor Account (for Hospital Account #[id number])     Name Relation to Pt Service Area Active? Acct Type    Daya Ignacio  FCS Yes Personal/Family    Address Phone          [address] [place] [address] [phone](H)              Coverage Information (for Hospital Account #[id number])     1. MEDICARE/MEDICARE     F/O Payor/Plan Precert #    MEDICARE/MEDICARE     Subscriber Subscriber #    Daya Ignacio [id number]    Address Phone    [employer] CLAIM[employer]  [address]  [place] [address] [phone]          2. COMMERCIAL/OTHER     F/O Payor/Plan Precert #    COMMERCIAL/OTHER     Subscriber Subscriber #    Daya Ignacio [id number]    Address Phone    [id number]  [place] [address] [phone]

## 2017-06-22 NOTE — PROGRESS NOTES
Discharge Placement        Facility/Agency Request Status Selected? Address Phone Number Fax Number     [facility] - REFERRAL ONLY (SNF) Pending - Request Sent     [id number] [address] [phone]     Merly Hickey RN 6/22/2017  2:12 PM    Admitting today will discharge most likely tomorrow due to increase weakness, she is was here in obs yesterday and sc to home, she is a [employer].              Admitting called back from [facility], they have no bed for her today but they can take her tomorrow if she agrees to go.      Merly Hickey RN BSN CTS  [facility]   Care Management Coordinator  [email or web address]   [phone]

## 2017-06-22 NOTE — IP AVS SNAPSHOT
` `     [place] [address] MEDICAL SURGICAL: [phone]            Medication Administration Report for Daya Ignacio as of 06/24/17 0953   Legend:    Given Hold Not Given Due Canceled Entry Other Actions    Time Time (Time) Time  Time-Action       Inactive    Active    Linked        Medications 06/18/17 06/19/17 06/20/17 06/21/17 06/22/17 06/23/17 06/24/17    acetaminophen (TYLENOL) tablet 650 mg  Dose: 650 mg Freq: EVERY 4 HOURS PRN Route: PO  PRN Reason: mild pain  Start: 06/22/17 1715   Admin Instructions: Alternate ibuprofen (if ordered) with acetaminophen.  Maximum acetaminophen dose from all sources = 75 mg/kg/day not to exceed 4 grams/day.          0012 (650 mg)-Given        0033 (650 mg)-Given           amLODIPine (NORVASC) tablet 10 mg  Dose: 10 mg Freq: DAILY Route: PO  Start: 06/23/17 0900   Admin Instructions: Hold if SBP < 120          0909 (10 mg)-Given        0818 (10 mg)-Given           aspirin EC EC tablet 81 mg  Dose: 81 mg Freq: DAILY Route: PO  Start: 06/23/17 0900   Admin Instructions: DO NOT CRUSH.          0910 (81 mg)-Given        0818 (81 mg)-Given           cloNIDine (CATAPRES) tablet 0.1 mg  Dose: 0.1 mg Freq: 2 TIMES DAILY Route: PO  Start: 06/22/17 2100   Admin Instructions: Hold for SBP < 115         2114 (0.1 mg)-Given        0910 (0.1 mg)-Given       2127 (0.1 mg)-Given        0819 (0.1 mg)-Given       [ ] 2100           estradiol (ESTRACE) cream 2 g  Dose: 2 g Freq: Once per day on Mon Wed Fri Route: VA  Start: 06/23/17 0900         1324 (2 g)-Given            gabapentin (NEURONTIN) capsule 300 mg  Dose: 300 mg Freq: 3 TIMES DAILY Route: PO  Start: 06/22/17 1730        1739 (300 mg)-Given       2114 (300 mg)-Given        0910 (300 mg)-Given       1607 (300 mg)-Given       2127 (300 mg)-Given        0818 (300 mg)-Given       [ ] 1600       [ ] 2200           HYDROcodone-acetaminophen (NORCO) 5-325 MG per tablet 1 tablet  Dose: 1 tablet Freq: EVERY 4 HOURS PRN Route: PO  PRN  Reason: moderate to severe pain  Start: 06/22/17 1715   Admin Instructions: Maximum acetaminophen dose from all sources= 75 mg/kg/day not to exceed 4 grams         1957 (1 tablet)-Given        0012 (1 tablet)-Given       0417 (1 tablet)-Given       0910 (1 tablet)-Given       1322 (1 tablet)-Given       1732 (1 tablet)-Given       2238 (1 tablet)-Given        0443 (1 tablet)-Given       0835 (1 tablet)-Given           hydrOXYzine (ATARAX) tablet 25 mg  Dose: 25 mg Freq: EVERY 4 HOURS PRN Route: PO  PRN Comment: pain  Start: 06/22/17 1715          0032 (25 mg)-Given       0819 (25 mg)-Given           isosorbide dinitrate (ISORDIL) tablet 10 mg  Dose: 10 mg Freq: 3 TIMES DAILY Route: PO  Start: 06/22/17 1730   Admin Instructions: Recommended to take on empty stomach.    Hold for SBP < 115         1740-Hold       2114 (10 mg)-Given        0910 (10 mg)-Given       1607 (10 mg)-Given       2127 (10 mg)-Given        0818 (10 mg)-Given       [ ] 1600       [ ] 2200           loperamide (IMODIUM) capsule 2 mg  Dose: 2 mg Freq: 4 TIMES DAILY PRN Route: PO  PRN Reason: diarrhea  Start: 06/24/17 0718          0818 (2 mg)-Given           magnesium sulfate 4 g in 100 mL sterile water (premade)  Dose: 4 g Freq: EVERY 4 HOURS PRN Route: IV  PRN Reason: magnesium supplementation  Start: 06/22/17 1715   Admin Instructions: For serum Mg++ less than 1.6 mg/dL  Give 4 g and recheck magnesium level 2 hours after dose, and next AM.               metoprolol (TOPROL-XL) 24 hr tablet 25 mg  Dose: 25 mg Freq: DAILY Route: PO  Start: 06/23/17 0900   Admin Instructions: DO NOT CRUSH. Tablet may be split in half along score line.    Hold for SBP < 110          0910 (25 mg)-Given        0818 (25 mg)-Given           naloxone (NARCAN) injection 0.1-0.4 mg  Dose: 0.1-0.4 mg Freq: EVERY 2 MIN PRN Route: IV  PRN Reason: opioid reversal  Start: 06/22/17 1715   Admin Instructions: For respiratory rate LESS than or EQUAL to 8.  Partial reversal dose:   0.1 mg titrated q 2 minutes for Analgesia Side Effects Monitoring Sedation Level of 3 (frequently drowsy, arousable, drifts to sleep during conversation).Full reversal dose:  0.4 mg bolus for Analgesia Side Effects Monitoring Sedation Level of 4 (somnolent, minimal or no response to stimulation).               nystatin (MYCOSTATIN) suspension 500,000 Units  Dose: 500,000 Units Freq: 4 TIMES DAILY Route: SWISH & SPIT  Start: 06/22/17 1800   Admin Instructions: Shake well         1739 (500,000 Units)-Given       2114 (500,000 Units)-Given        0953 (500,000 Units)-Given       1322 (500,000 Units)-Given       1737 (500,000 Units)-Given       2238 (500,000 Units)-Given        0819 (500,000 Units)-Given       [ ] 1300       [ ] 1800       [ ] 2200           ondansetron (ZOFRAN-ODT) ODT tab 4 mg  Dose: 4 mg Freq: EVERY 6 HOURS PRN Route: PO  PRN Reason: nausea  Start: 06/22/17 1715   Admin Instructions: This is Step 1 of nausea and vomiting management.  If nausea not resolved in 15 minutes, go to Step 2 prochlorperazine (COMPAZINE). Do not push through foil backing. Peel back foil and gently remove. Place on tongue immediately. Administration with liquid unnecessary              Or  ondansetron (ZOFRAN) injection 4 mg  Dose: 4 mg Freq: EVERY 6 HOURS PRN Route: IV  PRN Reasons: nausea,vomiting  Start: 06/22/17 1715   Admin Instructions: This is Step 1 of nausea and vomiting management.  If nausea not resolved in 15 minutes, go to Step 2 prochlorperazine (COMPAZINE).  Irritant.               potassium chloride (KLOR-CON) Packet 20-40 mEq  Dose: 20-40 mEq Freq: EVERY 2 HOURS PRN Route: ORAL OR FEED  PRN Reason: potassium supplementation  Start: 06/22/17 1715   Admin Instructions: Use if unable to tolerate tablets.  If Serum K+ 3.0-3.3, dose = 60 mEq po total dose (40 mEq x1 followed in 2 hours by 20 mEq x1). Recheck K+ level 4 hours after dose and the next AM.  If Serum K+ 2.5-2.9, dose = 80 mEq po total dose (40 mEq Q2H  x2). Recheck K+ level 4 hours after dose and the next AM.  If Serum K+ less than 2.5, See IV order.  Dissolve packet contents in 4-8 ounces of cold water or juice.          2037 (40 mEq)-Given       2238 (20 mEq)-Given [C]            potassium chloride 10 mEq in 100 mL intermittent infusion  Dose: 10 mEq Freq: EVERY 1 HOUR PRN Route: IV  PRN Reason: potassium supplementation  Start: 06/22/17 1715   Admin Instructions: Infuse via PERIPHERAL LINE or CENTRAL LINE. Use for central line replacement if patient weight less than 65 kg, if patient is on TPN with high potassium content or if unit does not stock 20 mEq bags.   If Serum K+ 3.0-3.3, dose = 10 mEq/hr x4 doses (40 mEq IV total dose). Recheck K+ level 2 hours after dose and the next AM.   If Serum K+ less than 3.0, dose = 10 mEq/hr x6 doses (60 mEq IV total dose). Recheck K+ level 2 hours after dose and the next AM.               potassium chloride 10 mEq in 100 mL intermittent infusion with 10 mg lidocaine  Dose: 10 mEq Freq: EVERY 1 HOUR PRN Route: IV  PRN Reason: potassium supplementation  Start: 06/22/17 1715   Admin Instructions: Infuse via PERIPHERAL LINE. Use potassium with lidocaine for pain with peripheral administration.  If Serum K+ 3.0-3.3, dose = 10 mEq/hr x4 doses (40 mEq IV total dose). Recheck K+ level 2 hours after dose and the next AM.  If Serum K+ less than 3.0, dose = 10 mEq/hr x6 doses (60 mEq IV total dose). Recheck K+ level 2 hours after dose and the next AM.               potassium chloride 20 mEq in 50 mL intermittent infusion  Dose: 20 mEq Freq: EVERY 1 HOUR PRN Route: IV  PRN Reason: potassium supplementation  Start: 06/22/17 1715   Admin Instructions: Infuse via CENTRAL LINE Only. May need EKG if less than 65 kg or on TPN - Max rate is 0.3 mEq/kg/hr for patients not on EKG monitoring.   If Serum K+ 3.0-3.3, dose = 20 mEq/hr x2 doses (40 mEq IV total dose). Recheck K+ level 2 hours after dose and the next AM.  If Serum K+ less than 3.0,  dose = 20 mEq/hr x3 doses (60 mEq IV total dose). Recheck K+ level 2 hours after dose and the next AM.               potassium chloride SA (K-DUR/KLOR-CON M) CR tablet 20-40 mEq  Dose: 20-40 mEq Freq: EVERY 2 HOURS PRN Route: PO  PRN Reason: potassium supplementation  Start: 06/22/17 1715   Admin Instructions: Use if able to take PO.   If Serum K+ 3.0-3.3, dose = 60 mEq po total dose (40 mEq x1 followed in 2 hours by 20 mEq x1). Recheck K+ level 4 hours after dose and the next AM.  If Serum K+ 2.5-2.9, dose = 80 mEq po total dose (40 mEq Q2H x2). Recheck K+ level 4 hours after dose and the next AM.  If Serum K+ less than 2.5, See IV order.  DO NOT CRUSH               prochlorperazine (COMPAZINE) injection 5 mg  Dose: 5 mg Freq: EVERY 6 HOURS PRN Route: IV  PRN Reasons: nausea,vomiting  Start: 06/22/17 1715   Admin Instructions: This is Step 2 of nausea and vomiting management.   If nausea not resolved in 15 minutes, give metoclopramide (REGLAN) if ordered (step 3 of nausea and vomiting management)              Or  prochlorperazine (COMPAZINE) tablet 5 mg  Dose: 5 mg Freq: EVERY 6 HOURS PRN Route: PO  PRN Reason: vomiting  Start: 06/22/17 1715   Admin Instructions: This is Step 2 of nausea and vomiting management.   If nausea not resolved in 15 minutes, give metoclopramide (REGLAN) if ordered (step 3 of nausea and vomiting management)              Or  prochlorperazine (COMPAZINE) Suppository 12.5 mg  Dose: 12.5 mg Freq: EVERY 12 HOURS PRN Route: RE  PRN Reasons: nausea,vomiting  Start: 06/22/17 1715   Admin Instructions: This is Step 2 of nausea and vomiting management.   If nausea not resolved in 15 minutes, give metoclopramide (REGLAN) if ordered (step 3 of nausea and vomiting management)               sertraline (ZOLOFT) tablet 50 mg  Dose: 50 mg Freq: DAILY Route: PO  Start: 06/23/17 0900         0909 (50 mg)-Given        0819 (50 mg)-Given          Completed Medications  Medications 06/18/17 06/19/17 06/20/17  06/21/17 06/22/17 06/23/17 06/24/17         Dose: 1,000 mL Freq: ONCE Route: IV  Last Dose: Stopped (06/22/17 1354)  Start: 06/22/17 1138   End: 06/22/17 1354        1139 (1,000 mL)-New Bag [C]       1354-Stopped               Dose: 1 tablet Freq: ONCE Route: PO  Start: 06/22/17 1533   End: 06/22/17 1542   Admin Instructions: Maximum acetaminophen dose from all sources= 75 mg/kg/day not to exceed 4 grams         1542 (1 tablet)-Given            Discontinued Medications  Medications 06/18/17 06/19/17 06/20/17 06/21/17 06/22/17 06/23/17 06/24/17         Rate: 125 mL/hr Freq: CONTINUOUS Route: IV  Start: 06/22/17 1730   End: 06/23/17 1314        1731 ( )-New Bag        0013 ( )-New Bag       0415 ( )-New Bag       0827 ( )-New Bag       1314-Med Discontinued          Rate: 125 mL/hr Freq: CONTINUOUS Route: IV  Start: 06/22/17 1139   End: 06/22/17 1715   Admin Instructions: Administer after the bolus.         1715-Med Discontinued                  Dose: 3 mL Freq: EVERY 8 HOURS Route: IK  Start: 06/22/17 1139   End: 06/22/17 1715   Admin Instructions: And Q1H PRN, to lock peripheral IV dormant line.         1715-Med Discontinued                 Dose: 3 mL Freq: EVERY 1 HOUR PRN Route: IK  PRN Reason: line flush  PRN Comment: for peripheral IV flush post IV meds  Start: 06/22/17 1137   End: 06/22/17 1715        1715-Med Discontinued

## 2017-06-22 NOTE — IP AVS SNAPSHOT
"` `           [place] [address] MEDICAL SURGICAL: [phone]                                              INTERAGENCY TRANSFER FORM - NURSING   2017                    Hospital Admission Date: 2017  MAGALY MCDANIELS   : 10/11/1930  Sex: Female        Attending Provider: Amanda Kenyon MD     Allergies:  Atorvastatin, Macrobid [Nitrofurantoin Anhydrous], Penicillins, Sulfa Drugs    Infection:  None   Service:  GENERAL MEDI    Ht:  1.6 m (5' 3\")   Wt:  56.7 kg (125 lb)   Admission Wt:  56.7 kg (125 lb)    BMI:  22.14 kg/m 2   BSA:  1.59 m 2            Patient PCP Information     Provider PCP Type    Marcy Soares PA-C, [facility]      Current Code Status     Date Active Code Status Order ID Comments User Context       Prior      Code Status History     Date Active Date Inactive Code Status Order ID Comments User Context    2017  8:10 AM  Full Code [id number]  Amanda Kenyon MD Outpatient    2017  5:15 PM 2017  8:10 AM Full Code [id number]  Amanda Kenyon MD Inpatient    2017  9:32 AM 2017  5:15 PM Full Code [id number]  Brittnee Petersen DO Outpatient    2017  6:27 PM 2017  9:32 AM Full Code [id number]  Gael Hernández MD Inpatient    2017 10:43 AM 2017  6:27 PM Full Code [id number]  Andre Mcmanus MD Outpatient    2017  4:56 PM 2017 10:43 AM Full Code [id number]  Shaan Harden MD Inpatient      Advance Directives        Does patient have a scanned Advance Directive/ACP document in EPIC?           Yes        Hospital Problems as of 2017              Priority Class Noted POA    Diarrhea Medium  2017 Yes      Non-Hospital Problems as of 2017              Priority Class Noted    Generalized anxiety disorder   2007    Osteoporosis   9/10/2010    Sciatica   9/10/2010    HYPERLIPIDEMIA LDL GOAL <130   10/31/2010    Hip fracture (H)   2011    Advanced directives, counseling/discussion   2011    " Vitamin D deficiency   9/4/2012    HTN (hypertension)   9/24/2012    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 Medium  4/16/2014    Hypertension goal BP (blood pressure) < 140/90 Medium  9/25/2015    Chronic pain   3/10/2017    NSTEMI (non-ST elevated myocardial infarction) (H) Medium  5/28/2017    Urinary tract infection without hematuria Medium  6/1/2017    Hip pain Medium  6/19/2017    Trochanteric fracture of left femur (H) Medium  6/20/2017      Immunizations     Name Date      Influenza (H1N1) 01/06/10     Influenza (High Dose) 3 valent vaccine 09/15/15     Pneumococcal 23 valent 09/10/10     TD (ADULT, 7+) 09/10/10     Zoster vaccine, live 09/06/13          END      ASSESSMENT     Discharge Profile Flowsheet     DISCHARGE NEEDS ASSESSMENT     Transitional Care  [facility] [phone] 06/23/17 1042    Equipment Currently Used at Home  grab bar;walker, rolling 06/23/17 1547   Skilled Nursing Facility  [facility] [phone], Fax: [phone] 06/23/17 0941    Transportation Available  car;family or friend will provide;van, wheelchair accessible 06/23/17 1548   PAS Number  [id number] 06/23/17 1042    # of Referrals Placed by CTS  Senior Linkage Line 06/23/17 1042   Senior Linkage Line Referral Placed  06/23/17 06/23/17 1042    GASTROINTESTINAL (ADULT,PEDIATRIC,OB)     SKIN      GI WDL  ex 06/24/17 0832   Inspection  Full 06/24/17 0832    All Quadrants Bowel Sounds  audible and active in all quadrants 06/24/17 0832   Skin WDL  ex 06/24/17 0832    Last Bowel Movement  06/24/17 06/24/17 0832   Skin Color/Characteristics  bruised (ecchymotic);redness blanchable (bottom) 06/24/17 0832    GI Signs/Symptoms  diarrhea 06/24/17 0832   Skin Temperature  warm 06/24/17 0832    Passing flatus  yes 06/24/17 0832   Skin Moisture  dry 06/24/17 0832    COMMUNICATION ASSESSMENT     Skin Integrity  bruise(s) (L wrist) 06/24/17 0832    Patient's communication style  spoken language  "(English or Bilingual) 06/22/17 1105   SAFETY      FINAL RESOURCES     Safety WDL  WDL 06/24/17 0832    Resources List  Transitional Care 06/23/17 1042   Safety Factors  patient up in chair;call light in reach;ID band on;wheels locked 06/24/17 0832                 Assessment WDL (Within Defined Limits) Definitions           Safety WDL     Effective: 09/28/15    Row Information: <b>WDL Definition:</b> Bed in low position, wheels locked; call light in reach; upper side rails up x 2; ID band on<br> <font color=\"gray\"><i>Item=AS safety wdl>>List=AS safety wdl>>Version=F14</i></font>      Skin WDL     Effective: 09/28/15    Row Information: <b>WDL Definition:</b> Warm; dry; intact; elastic; without discoloration; pressure points without redness<br> <font color=\"gray\"><i>Item=AS skin wdl>>List=AS skin wdl>>Version=F14</i></font>      Vitals     Vital Signs Flowsheet     VITAL SIGNS     Response to Interventions  Relief 06/23/17 1444    Temp  98.1  F (36.7  C) 06/24/17 0817   ANALGESIA SIDE EFFECTS MONITORING      Temp src  Oral 06/24/17 0817   Side Effects Monitoring: Respiratory Quality  R 06/24/17 0838    Resp  15 06/24/17 0817   Side Effects Monitoring: Respiratory Depth  N 06/24/17 0838    Pulse  65 06/22/17 1108   Side Effects Monitoring: Sedation Level  1 06/24/17 0838    Heart Rate  88 06/24/17 0818   HEIGHT AND WEIGHT      Pulse/Heart Rate Source  Monitor 06/24/17 0028   Height  1.6 m (5' 3\") 06/22/17 1108    BP  154/63 06/24/17 0817   Height Method  Stated 06/22/17 1108    BP Location  Left arm 06/24/17 0817   Weight  56.7 kg (125 lb) 06/22/17 1108    OXYGEN THERAPY     BSA (Calculated - sq m)  1.59 06/22/17 1108    SpO2  92 % 06/24/17 0028   BMI (Calculated)  22.19 06/22/17 1108    O2 Device  None (Room air) 06/24/17 0028   DAILY CARE      PAIN/COMFORT     Activity Type  up in chair 06/24/17 0817    Patient Currently in Pain  yes 06/24/17 0838   Activity Level of Assistance  assistance, 1 person 06/24/17 0817    " Preferred Pain Scale  number (Numeric Rating Pain Scale) 06/24/17 0838   Activity Assistive Device  gait belt;walker 06/24/17 0817    Patient's Stated Pain Goal  2 06/23/17 1607   POSITIONING      0-10 Pain Scale  8 06/24/17 0838   Body Position  independently positioning 06/24/17 0817    Pain Location  Hip 06/24/17 0838   Head of Bed (HOB)  HOB at 30 degrees 06/24/17 0655    Pain Orientation  Left 06/24/17 0838   Chair  Upright in chair 06/24/17 0817    Pain Descriptors  Aching 06/24/17 0838   Positioning/Transfer Devices  pillows 06/24/17 0817    Pain Intervention(s)  Medication (See eMAR) 06/24/17 0838                 Patient Lines/Drains/Airways Status    Active LINES/DRAINS/AIRWAYS     Name: Placement date: Placement time: Site: Days: Last dressing change:    Pressure Injury 06/22/17 Left Buttocks Area of blanchable reddness noted about a quarter in size 06/22/17   1330    1             Patient Lines/Drains/Airways Status    Active PICC/CVC     None            Intake/Output Detail Report     Date Intake     Output    Shift P.O. I.V. IV Piggyback Total Total       Noc 06/22/17 2300 - 06/23/17 0659 200 1048 -- 1248 -- 1248    Day 06/23/17 0700 - 06/23/17 1459 240 898 -- 1138 -- 1138    Yajaira 06/23/17 1500 - 06/23/17 2259 -- -- -- -- -- 0    Noc 06/23/17 2300 - 06/24/17 0659 240 -- -- 240 -- 240    Day 06/24/17 0700 - 06/24/17 1459 200 -- -- 200 -- 200      Last Void/BM       Most Recent Value    Urine Occurrence 1 at 06/24/2017 0816    Stool Occurrence 1 at 06/24/2017 0816      Case Management/Discharge Planning     Case Management/Discharge Planning Flowsheet     REFERRAL INFORMATION     FINAL RESOURCES      Arrived From  home or self-care 06/20/17 1301   Equipment Currently Used at Home  grab bar;walker, rolling 06/23/17 1548    # of Referrals Placed by CTS  Senior Linkage Line 06/23/17 1042   Resources List  Transitional Care 06/23/17 1042    Primary Care MD Name  Dr. Soares 06/20/17 0279   Transitional Care   [facility] [phone] 06/23/17 1042    LIVING ENVIRONMENT     Skilled Nursing Facility  [facility] [phone], Fax: [phone] 06/23/17 0941    Lives With  alone 06/23/17 1548   PAS Number  [id number] 06/23/17 1042    Living Arrangements  independent living facility 06/23/17 1548   Senior Linkage Line Referral Placed  06/23/17 06/23/17 1042    Provides Primary Care For  no one 06/20/17 1312   ABUSE RISK SCREEN      COPING/STRESS     QUESTION TO PATIENT:  Has a member of your family or a partner(now or in the past) intimidated, hurt, manipulated, or controlled you in any way?  no 06/22/17 1109    Major Change/Loss/Stressor  none 06/22/17 2004   QUESTION TO PATIENT: Do you feel safe going back to the place where you are living?  yes 06/22/17 1109    DISCHARGE PLANNING     (R) MENTAL HEALTH SUICIDE RISK      Transportation Available  car;family or friend will provide;van, wheelchair accessible 06/23/17 1548   Are you depressed or being treated for depression?  No 06/22/17 2004

## 2017-06-22 NOTE — LETTER
Transition Communication Hand-off for Care Transitions to Next Level of Care Provider    Name: Daya Ignacio  MRN #: [id number]  Primary Care Provider: Marcy Soares PA-C     Primary Clinic: [address] [address]     Reason for Hospitalization:  Weakness [R53.1]  Diarrhea, unspecified type [R19.7]  Admit Date/Time: 6/22/2017 11:03 AM  Discharge Date: 6/23/17  /Payor Source: Payor: MEDICARE / Plan: MEDICARE / Product Type: Medicare /     Readmission Assessment Measure (EMI) Risk Score/category:Elevated         Reason for Communication Hand-off Referral: Fragility  And readmission.  Discharge Plan: TCU       Concern for non-adherence with plan of care:   NO  Discharge Needs Assessment:  Needs       Most Recent Value    Equipment Currently Used at Home grab bar, walker, rolling    Transportation Available car, family or friend will provide, van, wheelchair accessible    # of Referrals Placed by [facility] Senior Linkage Line    Transitional Care [facility] [phone]    Skilled Nursing Facility [facility] [phone], Fax: [phone]    PAS Number [id number]    Senior Linkage Line Referral Placed 06/23/17          Already enrolled in Tele-monitoring program and name of program:  UNKNOWN  Follow-up specialty is recommended: Yes -cont to f/u with ortho    Follow-up plan:  No future appointments.    Any outstanding tests or procedures:        Referrals     Future Labs/Procedures    Occupational Therapy Adult Consult     Comments:    Evaluate and treat as clinically indicated.    Reason:  Diffuse weakness    Physical Therapy Adult Consult     Comments:    Evaluate and treat as clinically indicated.    Reason:  Diffuse weakness, left non displaced hip fracture (non operative)            Key Recommendations:  Patient recently admitted for non operative hip fx.  She comes back in with profound weakness and diarrhea.  CDIFF was negative.   DC'd to TCU for continued therapies.    Bre Bowie, RN,BSN, [facility]  Care Coordination  [phone]      AVS/Discharge Summary is the source of truth; this is a helpful guide for improved communication of patient story

## 2017-06-22 NOTE — H&P
[facility]  Hospitalist Admission Note  Name: Daya Ignacio    MRN: [id number]  YOB: 1930    Age: 86 year old  Date of admission: 6/22/2017  Primary care provider: Marcy Soares    Chief Complaint:  Diarrhea, weakness    Assessment and Plan:     Daya Ignacio is a 86 year old female with PMH including CAD (NSTEMI 5/2017), HTN, recent UTI and admission (6/19-6/21) for fall and was found to have non displaced left hip fracture (non operative) who was admitted 06/22/17 with profound weakness and diarrhea and was also found to have worsening leukocytosis and AYAKA.    1. Diarrhea: Developed significant diarrhea yesterday.  Has been on antibiotics for UTI so could have C diff (pending).  Will wait to start treatment until we have the results.  For now will continue IVF.  - C diff and enteric studies pending  - Continue IVF    2. AYAKA: Creatinine ~0.5 at baseline, today elevated to 1.1.  This is likely pre renal due to significant diarrhea and poor PO intake.  Will continue maintenance fluids, already received bolus in the ER.  Hold Lisinopril.  - NS at 125 mL/hour  - Hold Lisinopril  - BMP in AM    3. CAD: NSTEMI that was medically managed in 5/2017.  Currently no CP or SOB.  Will continue PTA medications with hold parameters except decrease Metoprolol dose and hold Lisinopril.    4. Recent Left Hip Fracture: Non displaced fracture.  Non operative.  Continue PRN pain medications.  Follow up orthopedics in clinic.      5. Diffuse Weakness: Likely due to dehydration and diarrhea.  Treatment as above and will consult PT.    6. Neuropathy: Continue Neurontin.    7. Depression: Continue Zoloft.    DVT Prophylaxis: Pneumatic Compression Devices  Code Status: Full Code  Discharge Dispo: Admit to inpatient status  Estimated Disch Date / # of Days until Disch: expect at least 2 midnights      History of Present Illness:  Daya Ignacio is a 86 year old female with PMH including CAD (NSTEMI  5/2017), HTN, recent UTI and admission (6/19-6/21) for fall and was found to have non displaced left hip fracture (non operative) who was admitted 06/22/17 with profound weakness and diarrhea and was also found to have worsening leukocytosis and AYAKA.  History was obtained through patient interview, chart review and discussion with MARGARITA Harris in the ER.    The patient was admitted 6/19-6/21 after she suffered a mechanical fall at home.  She was found to have a non displaced fracture of her left greater trochanter and was seen by orthopedics but this was deemed non operative and it was recommended to weight bear as tolerated and follow up in clinic in 2 weeks.  She also suffered a left wrist contusion but no fracture.  At that time she was also being treated for a UTI with Ciprofloxacin (now has completed therapy).  She was discharged home but once she got home she developed diarrhea which started at around 1 PM yesterday.  It was very watery and difficult to control as it would come on so quickly.  She has never had diarrhea like this before.  No blood in the stool.  She had a few episodes of emesis yesterday but none today although she is still nauseated.  She has not had abdominal pain.  Has felt diffusely weak and slightly lightheaded but has not had a fall since her discharge.  She was unable to get to the bathroom independently so her senior living called paramedics.  She has not really eaten anything since yesterday due to her GI symptoms.    She denies CP, SOB but did notice palpitations.  She has had chills but no fevers.  She had been walking fine with her walker until the diarrhea got worse and she developed diffuse weakness.     Past Medical History:  Past Medical History:   Diagnosis Date     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 4/16/2014     Closed fracture of unspecified part of neck of femur      Closed fracture of unspecified part of vertebral column without mention of spinal cord injury      from fall summer 06     Elevated blood pressure reading without diagnosis of hypertension      Hypertension      Macular degeneration (senile) of retina, unspecified      Past Surgical History:  Past Surgical History:   Procedure Laterality Date     C NONSPECIFIC PROCEDURE      lt hip pain     ORTHOPEDIC SURGERY       Social History:  Social History   Substance Use Topics     Smoking status: Never Smoker     Smokeless tobacco: Never Used     Alcohol use No     Social History     Social History Narrative   Lives in senior independent living.  Has someone who comes to clean her apartment once per month otherwise does her own ADLs.    Family History:  Family History   Problem Relation Age of Onset     Alzheimer Disease Mother      mild     Cardiovascular Father      heart attack     Neurologic Disorder Brother 83     Parkinson's     Allergies:  Allergies   Allergen Reactions     Atorvastatin Muscle Pain (Myalgia)     Macrobid [Nitrofurantoin Anhydrous] Other (See Comments)     Body aches     Penicillins      Sulfa Drugs      Medications:  Prescriptions Prior to Admission   Medication Sig Dispense Refill Last Dose     HYDROcodone-acetaminophen (NORCO) 5-325 MG per tablet Take 1 tablet by mouth every 4 hours as needed for moderate to severe pain 20 tablet 0 6/22/2017 at Unknown time     hydrOXYzine (ATARAX) 25 MG tablet Take 1 tablet (25 mg) by mouth every 4 hours as needed (pain) 20 tablet 0      ciprofloxacin (CIPRO) 250 MG tablet Take 1 tablet (250 mg) by mouth 2 times daily 14 tablet 0 6/22/2017 at am     nystatin (MYCOSTATIN) 078385 UNIT/ML suspension Swish and spit 5 mLs (500,000 Units) in mouth 4 times daily for 9 days 180 mL 0 6/22/2017 at am     estradiol (ESTRACE) 0.1 MG/GM cream Place 2 g vaginally three times a week paraben-free 6 g 1 need refill     Cholecalciferol (VITAMIN D3) 2000 UNITS CAPS Take 2,000 Units by mouth daily (with dinner)   6/21/2017 at Unknown time     Multiple Vitamins-Minerals  "(PRESERVISION AREDS) CAPS Take 1 capsule by mouth 2 times daily   6/22/2017 at am     cyanocobalamin (VITAMIN  B-12) 1000 MCG tablet Take 1,000 mcg by mouth every morning   6/21/2017 at Unknown time     metoprolol (TOPROL-XL) 100 MG 24 hr tablet TAKE ONE TABLET BY MOUTH DAILY 90 tablet 1 6/22/2017 at am     aspirin EC 81 MG EC tablet Take 1 tablet (81 mg) by mouth daily 30 tablet 3 6/22/2017 at Unknown time     isosorbide dinitrate (ISORDIL) 10 MG tablet Take 1 tablet (10 mg) by mouth 3 times daily 90 tablet 3 6/22/2017 at am     amLODIPine (NORVASC) 10 MG tablet Take 1 tablet (10 mg) by mouth daily 30 tablet 3 6/22/2017 at Unknown time     gabapentin (NEURONTIN) 300 MG capsule TAKE ONE CAPSULE BY MOUTH THREE TIMES A  capsule 1 6/22/2017 at am     sertraline (ZOLOFT) 50 MG tablet Take 1 tablet (50 mg) by mouth daily 90 tablet 1 6/22/2017 at am     lisinopril (PRINIVIL/ZESTRIL) 40 MG tablet Take 1 tablet (40 mg) by mouth daily 90 tablet 1 6/22/2017 at am     cloNIDine (CATAPRES) 0.1 MG tablet Take 1 tablet (0.1 mg) by mouth 2 times daily 180 tablet 2 6/22/2017 at am     Review of Systems:  A Comprehensive greater than 10 system review of systems was carried out.  Pertinent positives and negatives are noted above.  Otherwise negative for contributory information.     Physical Exam:  Blood pressure (!) 125/103, pulse 65, temperature 98.4  F (36.9  C), temperature source Oral, resp. rate 16, height 1.6 m (5' 3\"), weight 56.7 kg (125 lb), SpO2 92 %, not currently breastfeeding.  Wt Readings from Last 1 Encounters:   06/22/17 56.7 kg (125 lb)     Exam:   General: Alert, awake, no acute distress but appears fatigued.  HEENT: NC/AT, eyes anicteric and without injection, EOMI, face symmetric.  Dentition WNL, MMM.  Cardiac: RRR, normal S1, S2.  No murmurs/g/r.  No LE edema  Pulmonary: Normal chest rise, normal work of breathing.  Lungs CTAB without crackles or wheezing  Abdomen: soft, non-tender, non-distended.  " Normoactive BS.  No guarding or rebound tenderness.  Extremities: no deformities.  Warm, well perfused.  Skin: no rashes or lesions noted.  Warm and Dry.  Neuro: No focal deficits noted.  Speech clear.  Moving all extremities in bed  Psych: Appropriate affect. Alert and oriented x3    Data Reviewed Today:  Imaging:  No imaging this admission  Labs:    Recent Labs  Lab 06/22/17  1129 06/20/17  1259 06/19/17  1252   WBC 16.3* 11.7* 15.4*   HGB 10.1* 10.7* 11.7   HCT 30.6* 32.1* 36.5   MCV 93 93 95   * 142* 189       Recent Labs  Lab 06/22/17  1129 06/20/17  1259 06/19/17  1252    137 137   POTASSIUM 3.4 4.0 3.6   CHLORIDE 102 105 105   CO2 24 28 27   ANIONGAP 9 4 5   * 130* 140*   BUN 40* 20 17   CR 1.10* 0.49* 0.57   GFRESTIMATED 47* >90Non  GFR Calc >90Non  GFR Calc   GFRESTBLACK 57* >90African American GFR Calc >90African American GFR Calc   DAVE 8.5 8.8 9.0       Amanda Kenyon MD  Hospitalist  [facility]

## 2017-06-22 NOTE — IP AVS SNAPSHOT
[facility] [facility]    [address]    [facility] [address]    Phone:  [phone]    Fax:  [phone]                                       After Visit Summary   6/22/2017    Daya Ignacio    MRN: [id number]           After Visit Summary Signature Page     I have received my discharge instructions, and my questions have been answered. I have discussed any challenges I see with this plan with the nurse or doctor.    ..........................................................................................................................................  Patient/Patient Representative Signature      ..........................................................................................................................................  Patient Representative Print Name and Relationship to Patient    ..................................................               ................................................  Date                                            Time    ..........................................................................................................................................  Reviewed by Signature/Title    ...................................................              ..............................................  Date                                                            Time

## 2017-06-22 NOTE — IP AVS SNAPSHOT
` `     [place] [address] MEDICAL SURGICAL: [phone]                 INTERAGENCY TRANSFER FORM - NOTES (H&P, Discharge Summary, Consults, Procedures, Therapies)   2017                    Hospital Admission Date: 2017  MAGALY IGNACIO   : 10/11/1930  Sex: Female        Patient PCP Information     Provider PCP Type    Marcy Soares PA-C, [facility]         History & Physicals      H&P by Amanda Kenyon MD at 2017  2:42 PM     Author:  Amanda Kenyon MD Service:  Hospitalist Author Type:  Physician    Filed:  2017  7:03 PM Date of Service:  2017  2:42 PM Creation Time:  2017  2:42 PM    Status:  Signed :  Amanda Kenyon MD (Physician)         [facility]  Hospitalist Admission Note  Name: Magaly Ignacio    MRN: [id number]  YOB: 1930    Age: 86 year old  Date of admission: 2017  Primary care provider: Marcy Soares    Chief Complaint:[AC1.1]  Diarrhea, weakness[AC1.2]    Assessment and Plan:[AC1.1]     Magaly Ignacio is a 86 year old female with PMH including CAD (NSTEMI 2017), HTN, recent UTI and admission (-) for fall and was found to have non displaced left hip fracture (non operative) who was admitted 17 with profound weakness and diarrhea and was also found to have worsening leukocytosis and AYAKA.[AC1.2]    1.[AC1.1] Diarrhea: Developed significant diarrhea yesterday.  Has been on antibiotics for UTI so could have C diff (pending).  Will wait to start treatment until we have the results.  For now will continue IVF.  - C diff and enteric studies pending  - Continue IVF[AC1.3]    2.[AC1.1] AYAKA: Creatinine ~0.5 at baseline, today elevated to 1.1.  This is likely pre renal due to significant diarrhea and poor PO intake.  Will continue maintenance fluids, already received bolus in the ER.  Hold Lisinopril.  - NS at 125 mL/hour  - Hold Lisinopril  - BMP in AM    3. CAD: NSTEMI that was medically  managed in 5/2017.  Currently no CP or SOB.  Will continue PTA medications with hold parameters except decrease Metoprolol dose and hold Lisinopril.    4. Recent Left Hip Fracture: Non displaced fracture.  Non operative.  Continue PRN pain medications.  Follow up orthopedics in clinic.      5. Diffuse Weakness: Likely due to dehydration and diarrhea.  Treatment as above and will consult PT.    6. Neuropathy: Continue Neurontin.    7. Depression: Continue Zoloft.[AC1.3]    DVT Prophylaxis:[AC1.1] Pneumatic Compression Devices[AC1.3]  Code Status:[AC1.1] Full Code[AC1.3]  Discharge Dispo:[AC1.1] Admit to inpatient status[AC1.3]  Estimated Disch Date / # of Days until Disch:[AC1.1] expect at least 2 midnights[AC1.3]      History of Present Illness:  Daya Ignacio is a 86 year old female with PMH including[AC1.1] CAD (NSTEMI 5/2017), HTN, recent UTI and admission (6/19-6/21) for fall and was found to have non displaced left hip fracture (non operative)[AC1.2] who[AC1.1] was admitted[AC1.2] 06/22/17[AC1.4] with profound weakness and diarrhea and was also found to have worsening leukocytosis and AYAKA[AC1.2].  History was obtained through patient interview, chart review and discussion with[AC1.1] MARGARITA Harris[AC1.2] in the ER.[AC1.1]    The[AC1.2] patient[AC1.3] was admitted 6/19-6/21 after she suffered a mechanical fall at home.  She was found to have a non displaced fracture of her left greater trochanter and was seen by orthopedics but this was deemed non operative and it was recommended to weight bear as tolerated and follow up in clinic in 2 weeks.  She also suffered a left wrist contusion but no fracture.  At that time she was also being treated for a UTI with Ciprofloxacin (now has completed therapy).  She was discharged home but once she got home she developed diarrhea which started at around 1 PM yesterday.  It was very watery and difficult to control as it would come on so quickly.  She has never had[AC1.2]  diarrhea[AC1.3] like this before.  No blood in the stool.  She had a few episodes of emesis yesterday but none today although she is still nauseated.  She has not had abdominal pain.  Has felt diffusely weak and slightly lightheaded but has not had a fall since her discharge.  She was unable to get to the bathroom[AC1.2] independently[AC1.3] so her senior living called paramedics.  She has not really eaten anything since yesterday due to her GI symptoms.    She denies CP, SOB but did notice palpitations.  She has had chills but no fevers.  She had been walking fine with her walker until the diarrhea got worse and she developed diffuse weakness.[AC1.2]     Past Medical History:  Past Medical History:   Diagnosis Date     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 4/16/2014     Closed fracture of unspecified part of neck of femur      Closed fracture of unspecified part of vertebral column without mention of spinal cord injury     from fall summer 06     Elevated blood pressure reading without diagnosis of hypertension      Hypertension      Macular degeneration (senile) of retina, unspecified      Past Surgical History:  Past Surgical History:   Procedure Laterality Date     C NONSPECIFIC PROCEDURE      lt hip pain     ORTHOPEDIC SURGERY       Social History:  Social History   Substance Use Topics     Smoking status: Never Smoker     Smokeless tobacco: Never Used     Alcohol use No     Social History     Social History Narrative[AC1.1]   Lives in senior[AC1.2] independent[AC1.3] living.  Has someone who comes to clean her apartment once per month otherwise does her own ADLs.[AC1.2]    Family History:  Family History   Problem Relation Age of Onset     Alzheimer Disease Mother      mild     Cardiovascular Father      heart attack     Neurologic Disorder Brother 83     Parkinson's     Allergies:  Allergies   Allergen Reactions     Atorvastatin Muscle Pain (Myalgia)     Macrobid [Nitrofurantoin Anhydrous] Other (See  Comments)     Body aches     Penicillins      Sulfa Drugs      Medications:[AC1.1]  Prescriptions Prior to Admission   Medication Sig Dispense Refill Last Dose     HYDROcodone-acetaminophen (NORCO) 5-325 MG per tablet Take 1 tablet by mouth every 4 hours as needed for moderate to severe pain 20 tablet 0 6/22/2017 at Unknown time     hydrOXYzine (ATARAX) 25 MG tablet Take 1 tablet (25 mg) by mouth every 4 hours as needed (pain) 20 tablet 0      ciprofloxacin (CIPRO) 250 MG tablet Take 1 tablet (250 mg) by mouth 2 times daily 14 tablet 0 6/22/2017 at am     nystatin (MYCOSTATIN) 896597 UNIT/ML suspension Swish and spit 5 mLs (500,000 Units) in mouth 4 times daily for 9 days 180 mL 0 6/22/2017 at am     estradiol (ESTRACE) 0.1 MG/GM cream Place 2 g vaginally three times a week paraben-free 6 g 1 need refill     Cholecalciferol (VITAMIN D3) 2000 UNITS CAPS Take 2,000 Units by mouth daily (with dinner)   6/21/2017 at Unknown time     Multiple Vitamins-Minerals (PRESERVISION AREDS) CAPS Take 1 capsule by mouth 2 times daily   6/22/2017 at am     cyanocobalamin (VITAMIN  B-12) 1000 MCG tablet Take 1,000 mcg by mouth every morning   6/21/2017 at Unknown time     metoprolol (TOPROL-XL) 100 MG 24 hr tablet TAKE ONE TABLET BY MOUTH DAILY 90 tablet 1 6/22/2017 at am     aspirin EC 81 MG EC tablet Take 1 tablet (81 mg) by mouth daily 30 tablet 3 6/22/2017 at Unknown time     isosorbide dinitrate (ISORDIL) 10 MG tablet Take 1 tablet (10 mg) by mouth 3 times daily 90 tablet 3 6/22/2017 at am     amLODIPine (NORVASC) 10 MG tablet Take 1 tablet (10 mg) by mouth daily 30 tablet 3 6/22/2017 at Unknown time     gabapentin (NEURONTIN) 300 MG capsule TAKE ONE CAPSULE BY MOUTH THREE TIMES A  capsule 1 6/22/2017 at am     sertraline (ZOLOFT) 50 MG tablet Take 1 tablet (50 mg) by mouth daily 90 tablet 1 6/22/2017 at am     lisinopril (PRINIVIL/ZESTRIL) 40 MG tablet Take 1 tablet (40 mg) by mouth daily 90 tablet 1 6/22/2017 at am      "cloNIDine (CATAPRES) 0.1 MG tablet Take 1 tablet (0.1 mg) by mouth 2 times daily 180 tablet 2 6/22/2017 at am[AC1.5]     Review of Systems:  A Comprehensive greater than 10 system review of systems was carried out.  Pertinent positives and negatives are noted above.  Otherwise negative for contributory information.     Physical Exam:  Blood pressure (!) 125/103, pulse 65, temperature 98.4  F (36.9  C), temperature source Oral, resp. rate 16, height 1.6 m (5' 3\"), weight 56.7 kg (125 lb), SpO2 92 %, not currently breastfeeding.  Wt Readings from Last 1 Encounters:   06/22/17 56.7 kg (125 lb)     Exam:   General: Alert, awake, no acute distress[AC1.1] but appears fatigued[AC1.2].  HEENT: NC/AT, eyes anicteric and without injection, EOMI, face symmetric.  Dentition WNL, MMM.  Cardiac: RRR, normal S1, S2.  No murmurs/g/r.  No LE edema  Pulmonary: Normal chest rise, normal work of breathing.  Lungs CTAB[AC1.1] without crackles or wheezing[AC1.2]  Abdomen: soft, non-tender, non-distended.  Normoactive BS.  No guarding or rebound tenderness.  Extremities: no deformities.  Warm, well perfused.  Skin: no rashes or lesions noted.  Warm and Dry.  Neuro: No focal deficits noted.  Speech clear.[AC1.1]  Moving all extremities in bed[AC1.2]  Psych: Appropriate affect.[AC1.1] Alert and oriented x3[AC1.2]    Data Reviewed Today:  Imaging:[AC1.1]  No imaging this admission[AC1.2]  Labs:[AC1.1]    Recent Labs  Lab 06/22/17  1129 06/20/17  1259 06/19/17  1252   WBC 16.3* 11.7* 15.4*   HGB 10.1* 10.7* 11.7   HCT 30.6* 32.1* 36.5   MCV 93 93 95   * 142* 189       Recent Labs  Lab 06/22/17  1129 06/20/17  1259 06/19/17  1252    137 137   POTASSIUM 3.4 4.0 3.6   CHLORIDE 102 105 105   CO2 24 28 27   ANIONGAP 9 4 5   * 130* 140*   BUN 40* 20 17   CR 1.10* 0.49* 0.57   GFRESTIMATED 47* >90Non  GFR Calc >90Non  GFR Calc   GFRESTBLACK 57* >90African American GFR Calc >90African American GFR " Calc   DAVE 8.5 8.8 9.0[AC1.6]       Amanda Kenyon MD  Hospitalist  [facility][AC1.1]                Revision History        User Key Date/Time User Provider Type Action    > AC1.5 6/22/2017  7:03 PM Amanda Kenyon MD Physician Sign     AC1.3 6/22/2017  6:58 PM Amanda Kenyon MD Physician      AC1.6 6/22/2017  3:25 PM Amanda Kenyon MD Physician      AC1.4 6/22/2017  3:19 PM Amanda Kenyon MD Physician      AC1.2 6/22/2017  3:17 PM Amanda Kenyon MD Physician      AC1.1 6/22/2017  2:42 PM Amanda Kenyon MD Physician                   Discharge Summaries     No notes of this type exist for this encounter.      Consult Notes     No notes of this type exist for this encounter.         Progress Notes - Physician (Notes from 06/21/17 through 06/24/17)      Progress Notes by Jluis Cantrell PT at 6/23/2017  3:55 PM     Author:  Jluis Cantrell PT Service:  (none) Author Type:  Physical Therapist    Filed:  6/23/2017  3:55 PM Date of Service:  6/23/2017  3:55 PM Creation Time:  6/23/2017  3:55 PM    Status:  Signed :  Jluis Cantrell PT (Physical Therapist)          06/23/17 1547   Quick Adds   Type of Visit Initial PT Evaluation   Living Environment   Lives With alone   Living Arrangements independent living facility   Home Accessibility no concerns   Number of Stairs to Enter Home 0   Number of Stairs Within Home 0   Transportation Available car;family or friend will provide;van, wheelchair accessible   Living Environment Comment Pt lives in senior highrise, independent in all cares, ADLs, drives to appts when needed (doesn't do much because of poor eyesight).   Self-Care   Dominant Hand right   Usual Activity Tolerance moderate   Current Activity Tolerance fair   Regular Exercise no   Equipment Currently Used at Home grab bar;walker, rolling   Activity/Exercise/Self-Care Comment Pt reports independence with mobilities and ADLs at baseline, has been using walker x 3 days after fall.  "  Functional Level Prior   Ambulation 1-->assistive equipment   Transferring 1-->assistive equipment   Toileting 0-->independent   Fall history within last six months yes   Number of times patient has fallen within last six months 1   Which of the above functional risks had a recent onset or change? ambulation;transferring;fall history   Prior Functional Level Comment Previously indep with functional mobility and ADLs, has been using walker x 3 days following fall.   General Information   Onset of Illness/Injury or Date of Surgery - Date 06/22/17   Referring Physician Amanda Kenyon MD   Patient/Family Goals Statement TCU prior to returning home for continued strengthening   Pertinent History of Current Problem (include personal factors and/or comorbidities that impact the POC) 86 year old female with PMH including CAD (NSTEMI 5/2017), HTN, recent UTI and admission (6/19-6/21) for fall and was found to have non displaced left hip fracture (non operative) who was admitted 06/22/17 with profound weakness and diarrhea and was also found to have worsening leukocytosis and AYAKA.  No infectious source of diarrhea, likely related to antibiotics.  Per orthodpedics consult during previous admission, L greater trochanteric fracture is stable and she is WBAT, but was unable to mobilize safely at home.  Also injured L wrist, but no fracture.   Precautions/Limitations fall precautions   Weight-Bearing Status - LLE weight-bearing as tolerated   General Observations pt resting in bed, awake and alert   Cognitive Status Examination   Orientation orientation to person, place and time   Pain Assessment   Patient Currently in Pain Yes, see Vital Sign flowsheet  (\"minimal' at rest, increases to 6-8/10 with WB L hip)   Integumentary/Edema   Integumentary/Edema Comments mild edema about L greater trochanteric area, bruising to L wrist   Posture    Posture Forward head position   Posture Comments mild forward flexed posture in standing "   Range of Motion (ROM)   ROM Comment WNL carlos LEs, inc pain L hip flexion, up to >100 deg carlos though   Strength   Strength Comments Full 5/5 distally at ankles PF/DF, able to demo SLR on R but weak at hip, L hip weakness due to pain.  Some difficulty with functional sit <> stand assessment.   Bed Mobility   Bed Mobility Comments Min-mod A x 1 supine to sit EOB, limited by pain and weakness.   Transfer Skills   Transfer Comments Sit <> stand with min A x 1, mulitple attempts needed to rise, WBAT upon standing with inc pain L hip.   Gait   Gait Comments Slow gait speed, 3-pt gait pattern with FWW and CGA, inc pain L lateral hip to 6-8/10 with WBAT.  Amb distances limited to <50ft due to fatigue and pain.   Balance   Balance Comments impaired standing balance and gait stability due to pain/weakness, compensating well with FWW, needing Ax1 for safety, fall risk.   Sensory Examination   Sensory Perception Comments baseline mild tingling in feet, intact sensation to gross touch   Muscle Tone   Muscle Tone Comments mild mm guarding L hip with ROM assessment   Modality Interventions   Planned Modality Interventions Cryotherapy   Planned Modality Interventions Comments prn to L hip   General Therapy Interventions   Planned Therapy Interventions balance training;bed mobility training;gait training;neuromuscular re-education;ROM;strengthening;stretching;transfer training;risk factor education;home program guidelines;progressive activity/exercise   Clinical Impression   Criteria for Skilled Therapeutic Intervention yes, treatment indicated   PT Diagnosis Impaired functional mobility   Influenced by the following impairments Pain, weakness/deconditioning, impaired balance, decreased LE ROM tolerance   Functional limitations due to impairments Impaired tolerance with bed mobilities, transfers, gait skills, fall risk   Clinical Presentation Stable/Uncomplicated   Clinical Presentation Rationale stable medical status, improving  "pain, indep PLOF, plan in place for TCU at discharge   Clinical Decision Making (Complexity) Low complexity   Therapy Frequency` 5 times/week   Predicted Duration of Therapy Intervention (days/wks) 3 days   Anticipated Discharge Disposition Transitional Care Facility   Risk & Benefits of therapy have been explained Yes   Patient, Family & other staff in agreement with plan of care Yes   [facility] TM \"6 Clicks\"   2016, Trustees of [facility], under license to [employer].  All rights reserved.   6 Clicks Short Forms Basic Mobility Inpatient Short Form   [facility] AM-PAC  \"6 Clicks\" V.2 Basic Mobility Inpatient Short Form   1. Turning from your back to your side while in a flat bed without using bedrails? 3 - A Little   2. Moving from lying on your back to sitting on the side of a flat bed without using bedrails? 3 - A Little   3. Moving to and from a bed to a chair (including a wheelchair)? 3 - A Little   4. Standing up from a chair using your arms (e.g., wheelchair, or bedside chair)? 3 - A Little   5. To walk in hospital room? 3 - A Little   6. Climbing 3-5 steps with a railing? 2 - A Lot   Basic Mobility Raw Score (Score out of 24.Lower scores equate to lower levels of function) 17   Total Evaluation Time   Total Evaluation Time (Minutes) 15[CS1.1]        Revision History        User Key Date/Time User Provider Type Action    > CS1.1 6/23/2017  3:55 PM Jluis Cantrell, PT Physical Therapist Sign            Progress Notes by White, Corinne C, LSW at 6/23/2017  9:41 AM     Author:  White, Corinne C, LSW Service:  (none) Author Type:      Filed:  6/23/2017  3:30 PM Date of Service:  6/23/2017  9:41 AM Creation Time:  6/23/2017  9:41 AM    Status:  Addendum :  White, Corinne C, LSW ()         Discharge Planner[CW1.1]   Discharge Plans in progress: PT has TCU bed for Saturday to [facility]  Barriers to discharge plan: PT is feeling weak  Follow up plan: Pt " reported her son will provide transport        Entered by:[CW1.2] Corinne C. White 06/23/2017 9:42 AM[CW1.1]     CM: Spoke with pt's son Gael is aware that we have TCU bed at [facility]> that she is in [employer] and will have coverage to transfer to TCU   P: Son to provide transport[CW1.3]      Revision History        User Key Date/Time User Provider Type Action    > CW1.3 6/23/2017  3:30 PM White, Corinne C, LSW  Addend     CW1.1 6/23/2017  9:42 AM White, Corinne C, LSW  Sign     CW1.2 6/23/2017  9:41 AM White, Corinne C, LSW              Progress Notes by Kenya Aguiar TH at 6/23/2017  1:56 PM     Author:  Kenya Aguiar TH Service:  (none) Author Type:  Therapist    Filed:  6/23/2017  1:58 PM Date of Service:  6/23/2017  1:56 PM Creation Time:  6/23/2017  1:56 PM    Status:  Signed :  Kenya Aguiar TH (Therapist)         [employer] [employer]    Patient Name: Daya Ignacio  [id number]  Services received on: 6/23/2017    MEDICAL MUSIC THERAPY PROGRESS NOTE  INITIAL ASSESSMENT    Referral made by: Kate Phan RN  Referred for: Stimulation/engagement during hospitalization    Session interventions & outcomes: Pt lying in bed while on the phone when approached by therapist for music tx services. Pt declined services at this time and reported interests in turning tv volume up. Therapist assisted pt. Pt thanked therapist.    Follow up: Therapist will follow up with pt as per pt or team request for music therapy services during hospitalization. Therapist onsite on [address] on Mondays ([phone]) and Fridays ([phone]).    Kenya Aguiar MA, [facility]  Medical Music Therapy Services  [email or web address][HF1.1]           Revision History        User Key Date/Time User Provider Type Action    > HF1.1 6/23/2017  1:58 PM Kenya Aguiar TH Therapist Sign            Progress Notes by Amanda Kenyon MD at 6/23/2017  8:30 AM     Author:  Kostas  Amanda Chance MD Service:  Hospitalist Author Type:  Physician    Filed:  6/23/2017 10:53 AM Date of Service:  6/23/2017  8:30 AM Creation Time:  6/23/2017  8:30 AM    Status:  Signed :  Amanda Kenyon MD (Physician)         [facility]  Hospitalist Progress Note  Amanda Kenyon MD 06/23/17  Text Page  Pager: [phone] (7am-6pm)    Reason for Stay (Diagnosis):[AC1.1] Diarrhea, weakness[AC1.2]         Assessment and Plan:      Summary of Stay:[AC1.1] Daya Ignacio is a 86 year old female with PMH including CAD (NSTEMI 5/2017), HTN, recent UTI and admission (6/19-6/21) for fall and was found to have non displaced left hip fracture (non operative) who was admitted 06/22/17 with profound weakness and diarrhea and was also found to have worsening leukocytosis and AYAKA.  No infectious source of diarrhea, likely related to antibiotics.  Renal function improving but still quite weak.[AC1.2]    Problem List/Assessment and Plan:[AC1.1]     1. Diarrhea: Developed significant diarrhea the day prior to admission.  Has been on antibiotics for UTI.  C diff and enteric panel negative.  Suspect that this is diarrhea related to antibiotics.  Now off antibiotics and diarrhea improving.    - Regular diet  - Continue IVF - will stop if good PO intake today     2. AYAKA - Resolved: Creatinine ~0.5 at baseline, elevated to 1.1 on admission.  With IVF this has improved to 0.67 consistent with a pre renal azotemia.    - Hold Lisinopril  - BMP in AM     3. CAD: NSTEMI that was medically managed in 5/2017.  Currently no CP or SOB.  Will continue PTA medications with hold parameters except decrease Metoprolol dose and hold Lisinopril.     4. Recent Left Hip Fracture: Non displaced fracture.  Non operative.  Continue PRN pain medications.  Follow up orthopedics in clinic.       5. Diffuse Weakness: Likely due to dehydration and diarrhea.  Treatment as above and will consult PT.     6. Neuropathy: Continue  "Neurontin.     7. Depression: Continue Zoloft.    8. Anemia: Had mild anemia last admission, worse today but this could have been due to hemoconcentration as she was quite dehydrated on admission.  Does have significant bruising on her left wrist from prior fall but it is stable so do not expect bleeding into the joint.  No known blood loss.  Will repeat Hgb in AM but no indication for transfusion.[AC1.2]    DVT Prophylaxis:[AC1.1] Pneumatic Compression Devices[AC1.2]  Code Status:[AC1.1] Full Code[AC1.2]  Discharge Dispo:[AC1.1] TCU[AC1.2]  Estimated Disch Date / # of Days until Disch:[AC1.1] tomorrow as long as Hgb stable[AC1.2]        Interval History (Subjective):      Patient was seen with her bedside nurse this morning.  She is feeling a little better today, diarrhea is slowing down.  She is feeling hungry.  Denies nausea, emesis or abdominal pain.  Denies CP or SOB.  She still feels very weak.                  Physical Exam:      Last Vital Signs:  /44 (BP Location: Left arm)  Pulse 65  Temp 97.8  F (36.6  C) (Oral)  Resp 16  Ht 1.6 m (5' 3\")  Wt 56.7 kg (125 lb)  SpO2 91%  BMI 22.14 kg/m2    General: Alert, awake, no acute distress.  HEENT: Normocephalic and atraumatic, eyes anicteric and without scleral injection, EOMI, face symmetric, MMM.  Cardiac: RRR, normal S1, S2. II/CI flow murmur, no g/r, no LE edema.  Pulmonary: Normal chest rise, normal work of breathing.  Lungs CTAB without crackles or wheezing.  Abdomen: soft, non-tender, non-distended.  Normoactive bowel sounds, no guarding or rebound tenderness.  Extremities: no deformities.  Warm, well perfused.  Skin: no rashes or lesions.  Warm and Dry.  Neuro: No focal deficits.  Speech clear.  Coordination and strength grossly normal.  Psych: Alert and oriented x3. Appropriate affect.         Medications:      All current medications were reviewed with changes reflected in problem list.         Data:      All new lab and imaging data was " reviewed.   Labs:[AC1.1]    Recent Labs  Lab 06/23/17  0700 06/22/17  1129 06/20/17  1259    135 137   POTASSIUM 3.3* 3.4 4.0   CHLORIDE 109 102 105   CO2 24 24 28   ANIONGAP 6 9 4   GLC 93 127* 130*   BUN 32* 40* 20   CR 0.67 1.10* 0.49*   GFRESTIMATED 84 47* >90Non  GFR Calc   GFRESTBLACK >90African American GFR Calc 57* >90African American GFR Calc   DAVE 7.8* 8.5 8.8       Recent Labs  Lab 06/23/17  0700 06/22/17  1129 06/20/17  1259   WBC 9.0 16.3* 11.7*   HGB 8.7* 10.1* 10.7*   HCT 26.1* 30.6* 32.1*   MCV 94 93 93   PLT 94* 144* 142*[AC1.3]      Imaging:   No results found for this or any previous visit (from the past 24 hour(s)).    Amanda Kenyon MD.[AC1.1]              Revision History        User Key Date/Time User Provider Type Action    > AC1.2 6/23/2017 10:53 AM Amanda Kenyon MD Physician Sign     AC1.3 6/23/2017  8:32 AM Amanda Kenyon MD Physician      AC1.1 6/23/2017  8:30 AM Amanda Kenyon MD Physician             Progress Notes by Bre Bowie RN at 6/23/2017 10:41 AM     Author:  Bre Bowie RN Service:  Skilled Nursing Author Type:      Filed:  6/23/2017 10:41 AM Date of Service:  6/23/2017 10:41 AM Creation Time:  6/23/2017 10:41 AM    Status:  Signed :  Bre Bowie RN ()         Your information has been submitted on June 23rd, 2017 at 10:41:03 AM CDT. The confirmation number is [id number][AR1.1]       Revision History        User Key Date/Time User Provider Type Action    > AR1.1 6/23/2017 10:41 AM Bre Bowie RN Case Manager Sign            ED Provider Notes by Zeenat Arvizu PA-C at 6/22/2017 11:02 AM     Author:  Zeenat Arvizu PA-C Service:  Emergency Medicine Author Type:  Physician Assistant - CAROLINE    Filed:  6/22/2017  6:54 PM Date of Service:  6/22/2017 11:02 AM Creation Time:  6/22/2017 11:08 AM    Status:  Signed :  Zeenat Arvizu PA-C (Physician Assistant - C)            History     Chief Complaint:[BW1.1]  Generalized Weakness[AH1.1]      HPI   Daya Ignacio is a 86 year old female who presents with generalized weakness and diarrhea. The patient is taking Cipro for a UTI.[BW1.1] The patient[TF1.1] was[AH1.2] hospitalized from 6/19-6/21 secondary to a fall and subsequent nondisplaced left greater trochanteric hip fracture. S[TF1.1]lavelle[BW1.1] discharge yesterday[TF1.1], she has been experiencing diarrhea which she describes as brown and mostly water. She has had diarrhea 5[BW1.1]-6[AH1.2] times and vomited 3 times within the past 24 hours. She also reports having[BW1.1] intermittent[TF1.1] lower abdominal pain which also started yesterday. She describes having generalized weakness all over her body which is not localized to any area[BW1.1]. She is unable to walk secondary to her general weakness[TF1.1]. She reports being lightheaded but not dizzy and denies any fever.[BW1.1] The patient denies any dizziness, persistent hip pain or any other symptoms.[TF1.1]     Allergies:[BW1.1]  Atorvastatin  Macrobid [Nitrofurantoin Anhydrous]  Penicillins  Sulfa Drugs[TF1.2]     Medications:[BW1.1]    HYDROcodone-acetaminophen (NORCO) 5-325 MG per tablet  hydrOXYzine (ATARAX) 25 MG tablet  ciprofloxacin (CIPRO) 250 MG tablet  nystatin (MYCOSTATIN) 515851 UNIT/ML suspension  estradiol (ESTRACE) 0.1 MG/GM cream  Cholecalciferol (VITAMIN D3) 2000 UNITS CAPS  Multiple Vitamins-Minerals (PRESERVISION AREDS) CAPS  cyanocobalamin (VITAMIN  B-12) 1000 MCG tablet  metoprolol (TOPROL-XL) 100 MG 24 hr tablet  aspirin EC 81 MG EC tablet  isosorbide dinitrate (ISORDIL) 10 MG tablet  amLODIPine (NORVASC) 10 MG tablet  gabapentin (NEURONTIN) 300 MG capsule  sertraline (ZOLOFT) 50 MG tablet  lisinopril (PRINIVIL/ZESTRIL) 40 MG tablet  cloNIDine (CATAPRES) 0.1 MG tablet[TF1.1]    Past Medical History:[BW1.1]    Retinal vein occlusion, right  Hip fracture  Vertebral column fracture  Hypertension   Macular  "degeneration   Sciatica   Osteoporosis   Anxiety   NSTEMI[TF1.1]    Past Surgical History:[BW1.1]    Orthopedic surgery[TF1.1]     Family History:[BW1.1]    Mother - dementia  Father - MI  Brother - Parkinson's[TF1.1]     Social History:  Marital Status:  Single[BW1.1]  Smoking status: Never smoker  Alcohol use: No[TF1.1]      Review of Systems   Constitutional: Positive for[BW1.1] appetite change[TF1.1]. Negative for[BW1.1] fever[TF1.1].   Gastrointestinal: Positive for abdominal pain, diarrhea, nausea and[BW1.1] vomiting[TF1.1]. Negative for blood in stool.   Genitourinary: Positive for frequency.   Musculoskeletal: Negative for back pain.   Neurological: Positive for[BW1.1] weakness (generalized)[TF1.1] and light-headedness. Negative for dizziness.[BW1.1]   All other systems reviewed and are negative[TF1.1].      Physical Exam[BW1.1]     Patient Vitals for the past 24 hrs:   BP Temp Temp src Pulse Heart Rate Resp SpO2 Height Weight   06/22/17 1704 (!) 107/33 100.2  F (37.9  C) Oral - 71 20 94 % - -   06/22/17 1530 157/67 - - - - - 94 % - -   06/22/17 1515 158/59 - - - - - - - -   06/22/17 1500 153/70 - - - - - 97 % - -   06/22/17 1445 (!) 127/101 - - - - - 95 % - -   06/22/17 1430 (!) 125/103 - - - - - - - -   06/22/17 1415 153/69 - - - - - - - -   06/22/17 1400 156/66 - - - - - 92 % - -   06/22/17 1345 111/65 - - - - - 90 % - -   06/22/17 1330 131/59 - - - - - 92 % - -   06/22/17 1315 127/59 - - - - - 96 % - -   06/22/17 1300 136/55 - - - - - 91 % - -   06/22/17 1230 124/53 - - - - - - - -   06/22/17 1215 152/61 - - - - - - - -   06/22/17 1200 132/59 - - - - - 94 % - -   06/22/17 1145 122/53 - - - - - - - -   06/22/17 1130 105/46 - - - - - 95 % - -   06/22/17 1107 101/47 98.4  F (36.9  C) Oral 65 65 16 94 % 1.6 m (5' 3\") 56.7 kg (125 lb)[TF1.3]      Physical Exam[BW1.1]  Constitutional: Alert, attentive  HENT:    Nose: Nose normal.    Mouth/Throat: Oropharynx is clear, mucous membranes are moist  Eyes: EOM are " normal. Pupils are equal, round, and reactive to light.   CV: Regular rate and rhythm  Chest: Effort normal and breath sounds normal.   GI: No distension. There is no tenderness  MSK: Normal range of motion.   Neurological:   GCS 15; A/Ox3; Cranial nerves 2-12 intact;   5/5 strength throughout the upper and lower extremities;   sensation intact to light touch throughout the upper and lower extremities;   normal fine motor coordination intact bilaterally;   No meningismus   Skin: Skin is warm and dry.[AH1.1]     Emergency Department Course[BW1.1]   ECG (11:13:40):  Rate 64 bpm. KY interval 152ms. QRS duration 82ms. QT/QTc 426/439. P-R-T axes 66, -24, -6. Normal sinus rhythm. Minimal voltage criteria for LVH may be normal variant. Possible anterior infarct, age undermined. Abnormal ECG. Interpreted at 1207 by[BW1.2] Dr. iBll Mendez[AH1.2]    Laboratory:[BW1.1]  (1348) ISTAT gases lactate venous POCT: lactic acid 1.6, pH 7.38, pCO2 39 (L), pO2 17 (L), bicarbonate 23, O2 saturation 23[TF1.3]     CBC: WBC 16.3 (H), HGB 10.1 (L),  (L)  BMP: Glucose 127 (H), Bun 40 (H), GFR 47 (L), Creatinine 1.10 (H)[BW1.3], otherwise WNL[TF1.3]  (1129) Troponin:  <0.015[BW1.3]    UA: Soo, slightly cloudy urine[BW1.4], few bacteria, mucous present, 17 hyaline casts[TF1.3], o/w Negative[BW1.4]    C diff: pending  Enteric bacteria and Virus panel: pending[TF1.3]    Emergency Department Course:[BW1.1]  Nursing notes and vitals reviewed.  (1205) I performed an exam of the patient as documented above.[BW1.5]     EKG was done, interpretation as above.  Blood was drawn from the patient. This was sent for laboratory testing, findings above.   Stool sample was obtained and sent for laboratory analysis, findings above.  Urine sample was obtained and sent for laboratory analysis, findings above.[TF1.3]    (14[BW1.5]45[BW1.6]) Findings and plan explained to the Patient who consents to admission. Discussed the patient with [BW1.5]  Kostas[BW1.6] , who will admit the patient to a[BW1.5]in patient m[BW1.6]e[BW1.7]dical[BW1.6] bed for further monitoring, evaluation, and treatment.[BW1.5]    Impression & Plan      Medical Decision Making:[BW1.1]  Daya Ignacio is a 86 year old female[TF1.4] who presents today[BW1.7],[BW1.8] after being discharged[BW1.7] from the[1.8] hospital yesterday[BW1.7] for a[BW1.8] hip fracture[BW1.7], for generalized weakness. She states she is unable to walk, even with assistance, to the bathroom at her facility today. She has no focal weakness on neurologic exam. She has been on Cipro for the past few days for a UTI and on repeat urine today, it appears that this UTI is clearing. She has normal lactic acid here[BW1.8]. W[TF1.4]ith this generalized weakness[BW1.8] I[TF1.4] did perform[BW1.8] abroad[TF1.4] workup including cardiac workup which show[BW1.8]s a[TF1.4] normal EKG with no acute abnormalities and a negative troponin. She has been feeling weak all morning, greater than 6 hours, therefore[BW1.8] I[TF1.4] feel that this rules her out for ACS. Furthermore, she does not have any chest pain or shortness of breath. Patient does note that she has been having several episodes of diarrhea since last night, and her creatinine is bumped a little bit here which makes me believe that she is probably dehydrated, volume depleted which could be contributing to her weakness. White count is elevated as well and given that she is recently hospitalized and on antibiotics, she may have some C diff infection going on. We did send for a C. Diff test but it is not back yet. I gave the patient fluids here and on recheck she is still feeling extremely weak and does not feel that she could get up and ambulate even with assistance, therefore we will admit her to[BW1.8] inpatient[TF1.4] for fluids. She is admitted after[BW1.8] I[TF1.4] spoke with Dr. Kenyon of the hospital service.[BW1.8]     Diagnosis:[BW1.1]    ICD-10-CM   1. Weakness R53.1    2. Diarrhea, unspecified type R19.7[TF1.3]       Disposition:[BW1.1]  Patient is admitted to an inpatient bed under the care of Dr. Kenyon.[TF1.3]     Syed Webb  6/22/2017   [facility] EMERGENCY DEPARTMENT[BW1.1]    I, Syed Webb, am serving as a scribe at 12:05 PM on 6/22/2017 to document services personally performed by Zeenat Arvizu based on my observations and the provider's statements to me.[BW1.2]        Zeenat Arvizu PA-C  06/22/17 1854  [AH1.3]     Revision History        User Key Date/Time User Provider Type Action    > AH1.3 6/22/2017  6:54 PM Zeenat Arvizu PA-C Physician Assistant - C Sign     AH1.2 6/22/2017  6:53 PM Zeenat Arvizu PA-C Physician Assistant - C      TF1.3 6/22/2017  5:41 PM Floden, Wicho Scribe Share     TF1.4 6/22/2017  5:34 PM Floden, Wicho Scribe Share     BW1.8 6/22/2017  5:25 PM [place], Syed Scribe Share     BW1.7 6/22/2017  5:12 PM Tracey, Syed Scribe Share     [N/A] 6/22/2017  3:54 PM Tracey, Syed Scribe Share     [N/A] 6/22/2017  3:06 PM Floden, Wicho Scribe Share     BW1.6 6/22/2017  2:51 PM Tracey, Victoria Scribe Share     [N/A] 6/22/2017  2:18 PM Floden, Wicho Scribe Share     BW1.5 6/22/2017  2:12 PM [place], Victoria Scribe Share     [N/A] 6/22/2017  1:50 PM [place], Victoria Scribe Share     [N/A] 6/22/2017  1:43 PM [place], Syed Scribe Share     TF1.2 6/22/2017  1:41 PM Floden, Wicho Scribe Share     TF1.1 6/22/2017  1:32 PM Wicho Marks      [N/A] 6/22/2017  1:28 PM Syed Webb     AH1.1 6/22/2017  1:21 PM Zeenat Arvizu PA-C Physician Assistant - CAROLINE Perez     BW1.4 6/22/2017  1:17 PM Syed Webb     BW1.3 6/22/2017 12:53 PM Syed Webb     BW1.2 6/22/2017 12:08 PM Syed Webb     BW1.1 6/22/2017 11:39 AM Seyd Webb            ED Notes by Kriss Juan RN at 6/22/2017  2:49  PM     Author:  Kriss Juan RN Service:  (none) Author Type:  Registered Nurse    Filed:  6/22/2017  4:16 PM Date of Service:  6/22/2017  2:49 PM Creation Time:  6/22/2017  2:57 PM    Status:  Addendum :  Kriss Juan RN (Registered Nurse)         [facility]  ED Nurse Handoff Report    Daya Ignacio is a 86 year old female   ED Chief complaint:[CW1.1] Generalized Weakness[CW1.2]  . ED Diagnosis:[CW1.1]   Final diagnoses:   Weakness   Diarrhea, unspecified type[CW1.2]     Allergies:[CW1.1]   Allergies   Allergen Reactions     Atorvastatin Muscle Pain (Myalgia)     Macrobid [Nitrofurantoin Anhydrous] Other (See Comments)     Body aches     Penicillins      Sulfa Drugs[CW1.2]        Code Status: Full Code  Activity level - Baseline/Home:  Stand with Assist. Activity Level - Current:   Stand with Assist of 2. Lift room needed: No. Bariatric: No   Needed: No   Isolation: Yes. Infection: Not Applicable  C-Diff Pending.     Vital Signs:[CW1.1]   Vitals:    06/22/17 1400 06/22/17 1415 06/22/17 1430 06/22/17 1445   BP: 156/66 153/69 (!) 125/103 (!) 127/101   Pulse:       Resp:       Temp:       TempSrc:       SpO2: 92%   95%   Weight:       Height:[CW1.2]           Cardiac Rhythm:  ,      Pain level:    Patient confused: No. Patient Falls Risk: Yes.   Elimination Status: Has voided   Patient Report - Initial Complaint: Generalized weakness. Focused Assessment: Pt was recently discharged from hospital yesterday and then started to have diarrhea/vomiting that evening. Pt was hospitalized for a right hip fracture that they were not going to perform surgery on. Pt also recently being treated with cipro for UTI, with last dose of medication being tomorrow.  Pt states she was unable to get to the bathroom using her walker since she was so weak. Home health nurse felt that she should be seen. Pt denies dizziness, lightheadedness or fevers. Pt has had a couple loose stools down here  in the ED.   Tests Performed: Lab work, UA, and stool sample Abnormal Results: Glucose 127, creatinine 1.10, WBC 16.3  Treatments provided: Bag of NS  Family Comments: Spoke to son (Gael) of pt and updated on plan of care  OBS brochure/video discussed/provided to patient:  N/A  ED Medications:[CW1.1]   Medications   sodium chloride (PF) 0.9% PF flush 3 mL (not administered)   sodium chloride (PF) 0.9% PF flush 3 mL (not administered)   0.9% sodium chloride BOLUS (0 mLs Intravenous Stopped 6/22/17 1354)     Followed by   0.9% sodium chloride infusion (not administered)[CW1.2]     Drips infusing:  No         ED Nurse Name/Phone Number: Maisha Olsen,   2:50 PM[CW1.1]  RECEIVING UNIT ED HANDOFF REVIEW    Above ED Nurse Handoff Report was reviewed: YES  Reviewed by: Kriss Juan on June 22, 2017 at 4:16 PM[SA1.1]      Revision History        User Key Date/Time User Provider Type Action    > SA1.1 6/22/2017  4:16 PM Kriss Juan RN Registered Nurse Addend     CW1.2 6/22/2017  2:57 PM Maisha Olsen RN Registered Nurse Sign     CW1.1 6/22/2017  2:49 PM Maisha Olsen RN Registered Nurse             ED Notes by Maisha Olsen RN at 6/22/2017  2:31 PM     Author:  Maisha Olsen RN Service:  (none) Author Type:  Registered Nurse    Filed:  6/22/2017  2:31 PM Date of Service:  6/22/2017  2:31 PM Creation Time:  6/22/2017  2:31 PM    Status:  Signed :  Maisha Olsen RN (Registered Nurse)         Talked with Gael (son) of pt. Updated on plan of care for pt.[CW1.1]     Revision History        User Key Date/Time User Provider Type Action    > CW1.1 6/22/2017  2:31 PM Maisha Olsen RN Registered Nurse Sign            ED Notes by Maisha Olsen RN at 6/22/2017  1:50 PM     Author:  Maisha Olsen RN Service:  (none) Author Type:  Registered Nurse    Filed:  6/22/2017  2:25 PM Date of Service:  6/22/2017  1:50 PM Creation Time:  6/22/2017  2:25 PM    Status:  Signed :  Pretty  Maisha RN (Registered Nurse)         KANU care provided to pt. New chux applied underneath patient.[CW1.1]      Revision History        User Key Date/Time User Provider Type Action    > CW1.1 6/22/2017  2:25 PM Maisha Olsen RN Registered Nurse Sign            Progress Notes by Merly Hickey RN at 6/22/2017  2:17 PM     Author:  Merly Hickey RN Service:  Care Coordinator Author Type:      Filed:  6/22/2017  2:20 PM Date of Service:  6/22/2017  2:17 PM Creation Time:  6/22/2017  2:17 PM    Status:  Signed :  Merly Hickey RN ()         Discharge Placement        Facility/Agency Request Status Selected? Address Phone Number Fax Number     [facility] - REFERRAL ONLY (SNF) Pending - Request Sent     [id number] [phone] [phone]     Merly Hickey RN 6/22/2017  2:12 PM    Admitting today will discharge most likely tomorrow due to increase weakness, she is was here in obs yesterday and sc to home, she is a [employer].              Admitting called back from [facility], they have no bed for her today but they can take her tomorrow if she agrees to go.      Merly Hickey RN BSN CTS  [facility]   Care Management Coordinator  [email or web address]   [phone][SM1.1]       Revision History        User Key Date/Time User Provider Type Action    > SM1.1 6/22/2017  2:20 PM Merly Hickey RN Case Manager Sign            ED Notes by Maisha Olsen RN at 6/22/2017 11:09 AM     Author:  Maisha Olsen RN Service:  (none) Author Type:  Registered Nurse    Filed:  6/22/2017 11:12 AM Date of Service:  6/22/2017 11:09 AM Creation Time:  6/22/2017 11:09 AM    Status:  Signed :  Maisha Olsen RN (Registered Nurse)         Pt arrives via EMS from her independent senior living facility where she has been experiencing generalized weakness. Pt states she recently was hospitalized and  discharged yesterday for fracture of left hip. Pt states that when she got home from the hospital she had vomiting & diarrhea. Today pt's home health nurse tried to assist pt to bathroom and pt was unable to walk to bathroom. Pt states she has not had much appetite.[CW1.1]      Revision History        User Key Date/Time User Provider Type Action    > CW1.1 6/22/2017 11:12 AM Maisha Olsen RN Registered Nurse Sign            ED Notes by Araseli Calabrese RN at 6/22/2017 11:03 AM     Author:  Araseli Calabrese RN Service:  (none) Author Type:  Registered Nurse    Filed:  6/22/2017 11:03 AM Date of Service:  6/22/2017 11:03 AM Creation Time:  6/22/2017 11:03 AM    Status:  Signed :  Araseli Calabrese RN (Registered Nurse)         Bed: ED30  Expected date:   Expected time:   Means of arrival:   Comments:  B1 85 yo female     Revision History        User Key Date/Time User Provider Type Action    > PV1.1 6/22/2017 11:03 AM Araseli Calabrese RN Registered Nurse Sign                  Procedure Notes     No notes of this type exist for this encounter.         Progress Notes - Therapies (Notes from 06/21/17 through 06/24/17)      Progress Notes by Jluis Cantrell PT at 6/23/2017  3:55 PM     Author:  Jluis Cantrell PT Service:  (none) Author Type:  Physical Therapist    Filed:  6/23/2017  3:55 PM Date of Service:  6/23/2017  3:55 PM Creation Time:  6/23/2017  3:55 PM    Status:  Signed :  Jluis Cantrell PT (Physical Therapist)          06/23/17 1547   Quick Adds   Type of Visit Initial PT Evaluation   Living Environment   Lives With alone   Living Arrangements independent living facility   Home Accessibility no concerns   Number of Stairs to Enter Home 0   Number of Stairs Within Home 0   Transportation Available car;family or friend will provide;van, wheelchair accessible   Living Environment Comment Pt lives in senior highrise, independent in all cares, ADLs, drives to appts when needed (doesn't do much  because of poor eyesight).   Self-Care   Dominant Hand right   Usual Activity Tolerance moderate   Current Activity Tolerance fair   Regular Exercise no   Equipment Currently Used at Home grab bar;walker, rolling   Activity/Exercise/Self-Care Comment Pt reports independence with mobilities and ADLs at baseline, has been using walker x 3 days after fall.   Functional Level Prior   Ambulation 1-->assistive equipment   Transferring 1-->assistive equipment   Toileting 0-->independent   Fall history within last six months yes   Number of times patient has fallen within last six months 1   Which of the above functional risks had a recent onset or change? ambulation;transferring;fall history   Prior Functional Level Comment Previously indep with functional mobility and ADLs, has been using walker x 3 days following fall.   General Information   Onset of Illness/Injury or Date of Surgery - Date 06/22/17   Referring Physician Amanda Kenyon MD   Patient/Family Goals Statement TCU prior to returning home for continued strengthening   Pertinent History of Current Problem (include personal factors and/or comorbidities that impact the POC) 86 year old female with PMH including CAD (NSTEMI 5/2017), HTN, recent UTI and admission (6/19-6/21) for fall and was found to have non displaced left hip fracture (non operative) who was admitted 06/22/17 with profound weakness and diarrhea and was also found to have worsening leukocytosis and AYAKA.  No infectious source of diarrhea, likely related to antibiotics.  Per orthodpedics consult during previous admission, L greater trochanteric fracture is stable and she is WBAT, but was unable to mobilize safely at home.  Also injured L wrist, but no fracture.   Precautions/Limitations fall precautions   Weight-Bearing Status - LLE weight-bearing as tolerated   General Observations pt resting in bed, awake and alert   Cognitive Status Examination   Orientation orientation to person, place and  "time   Pain Assessment   Patient Currently in Pain Yes, see Vital Sign flowsheet  (\"minimal' at rest, increases to 6-8/10 with WB L hip)   Integumentary/Edema   Integumentary/Edema Comments mild edema about L greater trochanteric area, bruising to L wrist   Posture    Posture Forward head position   Posture Comments mild forward flexed posture in standing   Range of Motion (ROM)   ROM Comment WNL carlos LEs, inc pain L hip flexion, up to >100 deg carlos though   Strength   Strength Comments Full 5/5 distally at ankles PF/DF, able to demo SLR on R but weak at hip, L hip weakness due to pain.  Some difficulty with functional sit <> stand assessment.   Bed Mobility   Bed Mobility Comments Min-mod A x 1 supine to sit EOB, limited by pain and weakness.   Transfer Skills   Transfer Comments Sit <> stand with min A x 1, mulitple attempts needed to rise, WBAT upon standing with inc pain L hip.   Gait   Gait Comments Slow gait speed, 3-pt gait pattern with FWW and CGA, inc pain L lateral hip to 6-8/10 with WBAT.  Amb distances limited to <50ft due to fatigue and pain.   Balance   Balance Comments impaired standing balance and gait stability due to pain/weakness, compensating well with FWW, needing Ax1 for safety, fall risk.   Sensory Examination   Sensory Perception Comments baseline mild tingling in feet, intact sensation to gross touch   Muscle Tone   Muscle Tone Comments mild mm guarding L hip with ROM assessment   Modality Interventions   Planned Modality Interventions Cryotherapy   Planned Modality Interventions Comments prn to L hip   General Therapy Interventions   Planned Therapy Interventions balance training;bed mobility training;gait training;neuromuscular re-education;ROM;strengthening;stretching;transfer training;risk factor education;home program guidelines;progressive activity/exercise   Clinical Impression   Criteria for Skilled Therapeutic Intervention yes, treatment indicated   PT Diagnosis Impaired functional " "mobility   Influenced by the following impairments Pain, weakness/deconditioning, impaired balance, decreased LE ROM tolerance   Functional limitations due to impairments Impaired tolerance with bed mobilities, transfers, gait skills, fall risk   Clinical Presentation Stable/Uncomplicated   Clinical Presentation Rationale stable medical status, improving pain, indep PLOF, plan in place for TCU at discharge   Clinical Decision Making (Complexity) Low complexity   Therapy Frequency` 5 times/week   Predicted Duration of Therapy Intervention (days/wks) 3 days   Anticipated Discharge Disposition Transitional Care Facility   Risk & Benefits of therapy have been explained Yes   Patient, Family & other staff in agreement with plan of care Yes   [facility] TM \"6 Clicks\"   2016, Trustees of [facility], under license to [employer].  All rights reserved.   6 Clicks Short Forms Basic Mobility Inpatient Short Form   [facility]  \"6 Clicks\" V.2 Basic Mobility Inpatient Short Form   1. Turning from your back to your side while in a flat bed without using bedrails? 3 - A Little   2. Moving from lying on your back to sitting on the side of a flat bed without using bedrails? 3 - A Little   3. Moving to and from a bed to a chair (including a wheelchair)? 3 - A Little   4. Standing up from a chair using your arms (e.g., wheelchair, or bedside chair)? 3 - A Little   5. To walk in hospital room? 3 - A Little   6. Climbing 3-5 steps with a railing? 2 - A Lot   Basic Mobility Raw Score (Score out of 24.Lower scores equate to lower levels of function) 17   Total Evaluation Time   Total Evaluation Time (Minutes) 15[CS1.1]        Revision History        User Key Date/Time User Provider Type Action    > CS1.1 6/23/2017  3:55 PM Jluis Cantrell, JASON Physical Therapist Sign            "

## 2017-06-22 NOTE — ED NOTES
Pt arrives via EMS from her independent senior living facility where she has been experiencing generalized weakness. Pt states she recently was hospitalized and discharged yesterday for fracture of left hip. Pt states that when she got home from the hospital she had vomiting & diarrhea. Today pt's home health nurse tried to assist pt to bathroom and pt was unable to walk to bathroom. Pt states she has not had much appetite.

## 2017-06-23 ENCOUNTER — APPOINTMENT (OUTPATIENT)
Dept: PHYSICAL THERAPY | Facility: CLINIC | Age: 82
DRG: 393 | End: 2017-06-23
Attending: INTERNAL MEDICINE
Payer: MEDICARE

## 2017-06-23 LAB
ANION GAP SERPL CALCULATED.3IONS-SCNC: 6 MMOL/L (ref 3–14)
BUN SERPL-MCNC: 32 MG/DL (ref 7–30)
CALCIUM SERPL-MCNC: 7.8 MG/DL (ref 8.5–10.1)
CHLORIDE SERPL-SCNC: 109 MMOL/L (ref 94–109)
CO2 SERPL-SCNC: 24 MMOL/L (ref 20–32)
CREAT SERPL-MCNC: 0.67 MG/DL (ref 0.52–1.04)
ERYTHROCYTE [DISTWIDTH] IN BLOOD BY AUTOMATED COUNT: 15.5 % (ref 10–15)
GFR SERPL CREATININE-BSD FRML MDRD: 84 ML/MIN/1.7M2
GLUCOSE SERPL-MCNC: 93 MG/DL (ref 70–99)
HCT VFR BLD AUTO: 26.1 % (ref 35–47)
HGB BLD-MCNC: 8.7 G/DL (ref 11.7–15.7)
INTERPRETATION ECG - MUSE: NORMAL
MCH RBC QN AUTO: 31.2 PG (ref 26.5–33)
MCHC RBC AUTO-ENTMCNC: 33.3 G/DL (ref 31.5–36.5)
MCV RBC AUTO: 94 FL (ref 78–100)
PLATELET # BLD AUTO: 94 10E9/L (ref 150–450)
POTASSIUM SERPL-SCNC: 3.3 MMOL/L (ref 3.4–5.3)
RBC # BLD AUTO: 2.79 10E12/L (ref 3.8–5.2)
SODIUM SERPL-SCNC: 139 MMOL/L (ref 133–144)
WBC # BLD AUTO: 9 10E9/L (ref 4–11)

## 2017-06-23 PROCEDURE — 97530 THERAPEUTIC ACTIVITIES: CPT | Mod: GP | Performed by: PHYSICAL THERAPIST

## 2017-06-23 PROCEDURE — 97161 PT EVAL LOW COMPLEX 20 MIN: CPT | Mod: GP | Performed by: PHYSICAL THERAPIST

## 2017-06-23 PROCEDURE — 97116 GAIT TRAINING THERAPY: CPT | Mod: GP | Performed by: PHYSICAL THERAPIST

## 2017-06-23 PROCEDURE — 85027 COMPLETE CBC AUTOMATED: CPT | Performed by: INTERNAL MEDICINE

## 2017-06-23 PROCEDURE — 36415 COLL VENOUS BLD VENIPUNCTURE: CPT | Performed by: INTERNAL MEDICINE

## 2017-06-23 PROCEDURE — 12000000 ZZH R&B MED SURG/OB

## 2017-06-23 PROCEDURE — 40000193 ZZH STATISTIC PT WARD VISIT: Performed by: PHYSICAL THERAPIST

## 2017-06-23 PROCEDURE — 99232 SBSQ HOSP IP/OBS MODERATE 35: CPT | Performed by: INTERNAL MEDICINE

## 2017-06-23 PROCEDURE — 25000128 H RX IP 250 OP 636: Performed by: INTERNAL MEDICINE

## 2017-06-23 PROCEDURE — A9270 NON-COVERED ITEM OR SERVICE: HCPCS | Mod: GY | Performed by: INTERNAL MEDICINE

## 2017-06-23 PROCEDURE — 97110 THERAPEUTIC EXERCISES: CPT | Mod: GP | Performed by: PHYSICAL THERAPIST

## 2017-06-23 PROCEDURE — 80048 BASIC METABOLIC PNL TOTAL CA: CPT | Performed by: INTERNAL MEDICINE

## 2017-06-23 PROCEDURE — 25000132 ZZH RX MED GY IP 250 OP 250 PS 637: Mod: GY | Performed by: INTERNAL MEDICINE

## 2017-06-23 RX ADMIN — GABAPENTIN 300 MG: 300 CAPSULE ORAL at 16:07

## 2017-06-23 RX ADMIN — SODIUM CHLORIDE: 9 INJECTION, SOLUTION INTRAVENOUS at 08:27

## 2017-06-23 RX ADMIN — GABAPENTIN 300 MG: 300 CAPSULE ORAL at 21:27

## 2017-06-23 RX ADMIN — METOPROLOL SUCCINATE 25 MG: 25 TABLET, EXTENDED RELEASE ORAL at 09:10

## 2017-06-23 RX ADMIN — ASPIRIN 81 MG: 81 TABLET, COATED ORAL at 09:10

## 2017-06-23 RX ADMIN — SODIUM CHLORIDE: 9 INJECTION, SOLUTION INTRAVENOUS at 00:13

## 2017-06-23 RX ADMIN — CLONIDINE HYDROCHLORIDE 0.1 MG: 0.1 TABLET ORAL at 09:10

## 2017-06-23 RX ADMIN — ISOSORBIDE DINITRATE 10 MG: 10 TABLET ORAL at 16:07

## 2017-06-23 RX ADMIN — HYDROCODONE BITARTRATE AND ACETAMINOPHEN 1 TABLET: 5; 325 TABLET ORAL at 04:17

## 2017-06-23 RX ADMIN — SODIUM CHLORIDE: 9 INJECTION, SOLUTION INTRAVENOUS at 04:15

## 2017-06-23 RX ADMIN — ISOSORBIDE DINITRATE 10 MG: 10 TABLET ORAL at 09:10

## 2017-06-23 RX ADMIN — GABAPENTIN 300 MG: 300 CAPSULE ORAL at 09:10

## 2017-06-23 RX ADMIN — HYDROCODONE BITARTRATE AND ACETAMINOPHEN 1 TABLET: 5; 325 TABLET ORAL at 22:38

## 2017-06-23 RX ADMIN — HYDROCODONE BITARTRATE AND ACETAMINOPHEN 1 TABLET: 5; 325 TABLET ORAL at 09:10

## 2017-06-23 RX ADMIN — POTASSIUM CHLORIDE 40 MEQ: 1.5 POWDER, FOR SOLUTION ORAL at 20:37

## 2017-06-23 RX ADMIN — POTASSIUM CHLORIDE 20 MEQ: 1.5 POWDER, FOR SOLUTION ORAL at 22:38

## 2017-06-23 RX ADMIN — ISOSORBIDE DINITRATE 10 MG: 10 TABLET ORAL at 21:27

## 2017-06-23 RX ADMIN — NYSTATIN 500000 UNITS: 100000 SUSPENSION ORAL at 17:37

## 2017-06-23 RX ADMIN — NYSTATIN 500000 UNITS: 100000 SUSPENSION ORAL at 09:53

## 2017-06-23 RX ADMIN — SERTRALINE HYDROCHLORIDE 50 MG: 50 TABLET ORAL at 09:09

## 2017-06-23 RX ADMIN — HYDROCODONE BITARTRATE AND ACETAMINOPHEN 1 TABLET: 5; 325 TABLET ORAL at 17:32

## 2017-06-23 RX ADMIN — NYSTATIN 500000 UNITS: 100000 SUSPENSION ORAL at 13:22

## 2017-06-23 RX ADMIN — HYDROCODONE BITARTRATE AND ACETAMINOPHEN 1 TABLET: 5; 325 TABLET ORAL at 00:12

## 2017-06-23 RX ADMIN — AMLODIPINE BESYLATE 10 MG: 10 TABLET ORAL at 09:09

## 2017-06-23 RX ADMIN — ACETAMINOPHEN 650 MG: 325 TABLET, FILM COATED ORAL at 00:12

## 2017-06-23 RX ADMIN — ESTRADIOL 2 G: 0.1 CREAM VAGINAL at 13:24

## 2017-06-23 RX ADMIN — HYDROCODONE BITARTRATE AND ACETAMINOPHEN 1 TABLET: 5; 325 TABLET ORAL at 13:22

## 2017-06-23 RX ADMIN — NYSTATIN 500000 UNITS: 100000 SUSPENSION ORAL at 22:38

## 2017-06-23 RX ADMIN — CLONIDINE HYDROCHLORIDE 0.1 MG: 0.1 TABLET ORAL at 21:27

## 2017-06-23 NOTE — PROGRESS NOTES
06/23/17 1547   Quick Adds   Type of Visit Initial PT Evaluation   Living Environment   Lives With alone   Living Arrangements independent living facility   Home Accessibility no concerns   Number of Stairs to Enter Home 0   Number of Stairs Within Home 0   Transportation Available car;family or friend will provide;van, wheelchair accessible   Living Environment Comment Pt lives in senior highrise, independent in all cares, ADLs, drives to appts when needed (doesn't do much because of poor eyesight).   Self-Care   Dominant Hand right   Usual Activity Tolerance moderate   Current Activity Tolerance fair   Regular Exercise no   Equipment Currently Used at Home grab bar;walker, rolling   Activity/Exercise/Self-Care Comment Pt reports independence with mobilities and ADLs at baseline, has been using walker x 3 days after fall.   Functional Level Prior   Ambulation 1-->assistive equipment   Transferring 1-->assistive equipment   Toileting 0-->independent   Fall history within last six months yes   Number of times patient has fallen within last six months 1   Which of the above functional risks had a recent onset or change? ambulation;transferring;fall history   Prior Functional Level Comment Previously indep with functional mobility and ADLs, has been using walker x 3 days following fall.   General Information   Onset of Illness/Injury or Date of Surgery - Date 06/22/17   Referring Physician Amanda Kenyon MD   Patient/Family Goals Statement TCU prior to returning home for continued strengthening   Pertinent History of Current Problem (include personal factors and/or comorbidities that impact the POC) 86 year old female with PMH including CAD (NSTEMI 5/2017), HTN, recent UTI and admission (6/19-6/21) for fall and was found to have non displaced left hip fracture (non operative) who was admitted 06/22/17 with profound weakness and diarrhea and was also found to have worsening leukocytosis and AYAKA.  No infectious  "source of diarrhea, likely related to antibiotics.  Per orthodpedics consult during previous admission, L greater trochanteric fracture is stable and she is WBAT, but was unable to mobilize safely at home.  Also injured L wrist, but no fracture.   Precautions/Limitations fall precautions   Weight-Bearing Status - LLE weight-bearing as tolerated   General Observations pt resting in bed, awake and alert   Cognitive Status Examination   Orientation orientation to person, place and time   Pain Assessment   Patient Currently in Pain Yes, see Vital Sign flowsheet  (\"minimal' at rest, increases to 6-8/10 with WB L hip)   Integumentary/Edema   Integumentary/Edema Comments mild edema about L greater trochanteric area, bruising to L wrist   Posture    Posture Forward head position   Posture Comments mild forward flexed posture in standing   Range of Motion (ROM)   ROM Comment WNL carlos LEs, inc pain L hip flexion, up to >100 deg carlos though   Strength   Strength Comments Full 5/5 distally at ankles PF/DF, able to demo SLR on R but weak at hip, L hip weakness due to pain.  Some difficulty with functional sit <> stand assessment.   Bed Mobility   Bed Mobility Comments Min-mod A x 1 supine to sit EOB, limited by pain and weakness.   Transfer Skills   Transfer Comments Sit <> stand with min A x 1, mulitple attempts needed to rise, WBAT upon standing with inc pain L hip.   Gait   Gait Comments Slow gait speed, 3-pt gait pattern with FWW and CGA, inc pain L lateral hip to 6-8/10 with WBAT.  Amb distances limited to <50ft due to fatigue and pain.   Balance   Balance Comments impaired standing balance and gait stability due to pain/weakness, compensating well with FWW, needing Ax1 for safety, fall risk.   Sensory Examination   Sensory Perception Comments baseline mild tingling in feet, intact sensation to gross touch   Muscle Tone   Muscle Tone Comments mild mm guarding L hip with ROM assessment   Modality Interventions   Planned " "Modality Interventions Cryotherapy   Planned Modality Interventions Comments prn to L hip   General Therapy Interventions   Planned Therapy Interventions balance training;bed mobility training;gait training;neuromuscular re-education;ROM;strengthening;stretching;transfer training;risk factor education;home program guidelines;progressive activity/exercise   Clinical Impression   Criteria for Skilled Therapeutic Intervention yes, treatment indicated   PT Diagnosis Impaired functional mobility   Influenced by the following impairments Pain, weakness/deconditioning, impaired balance, decreased LE ROM tolerance   Functional limitations due to impairments Impaired tolerance with bed mobilities, transfers, gait skills, fall risk   Clinical Presentation Stable/Uncomplicated   Clinical Presentation Rationale stable medical status, improving pain, indep PLOF, plan in place for TCU at discharge   Clinical Decision Making (Complexity) Low complexity   Therapy Frequency` 5 times/week   Predicted Duration of Therapy Intervention (days/wks) 3 days   Anticipated Discharge Disposition Transitional Care Facility   Risk & Benefits of therapy have been explained Yes   Patient, Family & other staff in agreement with plan of care Yes   [facility] [employer] TM \"6 Clicks\"   2016, Trustees of [facility], under license to [employer].  All rights reserved.   6 Clicks Short Forms Basic Mobility Inpatient Short Form   [facility] AM-PAC  \"6 Clicks\" V.2 Basic Mobility Inpatient Short Form   1. Turning from your back to your side while in a flat bed without using bedrails? 3 - A Little   2. Moving from lying on your back to sitting on the side of a flat bed without using bedrails? 3 - A Little   3. Moving to and from a bed to a chair (including a wheelchair)? 3 - A Little   4. Standing up from a chair using your arms (e.g., wheelchair, or bedside chair)? 3 - A Little   5. To walk in hospital room? 3 - A Little   6. Climbing 3-5 " steps with a railing? 2 - A Lot   Basic Mobility Raw Score (Score out of 24.Lower scores equate to lower levels of function) 17   Total Evaluation Time   Total Evaluation Time (Minutes) 15

## 2017-06-23 NOTE — PLAN OF CARE
Problem: Goal Outcome Summary  Goal: Goal Outcome Summary  Outcome: No Change  Pt admitted for diarrhea and vomiting.  Tmax 100.2,  Recheck 99.8.  VSS otherwise.  Pt oriented, but forgetful.  Pt recent hospital stay for Fx hip and contusion of wrist.  Given norco for pain.  Recent UTI, voiding with no issues.  Denies nausea, tolerating a full liquid diet.  Continues to have some diarrhea.  Transfers with assistance of 1-2 people and c/o being very weak.  IVF infusing.

## 2017-06-23 NOTE — PROGRESS NOTES
[facility]  Hospitalist Progress Note  Amanda Kenyon MD 06/23/17  Text Page  Pager: [phone] (7am-6pm)    Reason for Stay (Diagnosis): Diarrhea, weakness         Assessment and Plan:      Summary of Stay: Daya Ignacio is a 86 year old female with PMH including CAD (NSTEMI 5/2017), HTN, recent UTI and admission (6/19-6/21) for fall and was found to have non displaced left hip fracture (non operative) who was admitted 06/22/17 with profound weakness and diarrhea and was also found to have worsening leukocytosis and AYAKA.  No infectious source of diarrhea, likely related to antibiotics.  Renal function improving but still quite weak.    Problem List/Assessment and Plan:     1. Diarrhea: Developed significant diarrhea the day prior to admission.  Has been on antibiotics for UTI.  C diff and enteric panel negative.  Suspect that this is diarrhea related to antibiotics.  Now off antibiotics and diarrhea improving.    - Regular diet  - Continue IVF - will stop if good PO intake today     2. AYAKA - Resolved: Creatinine ~0.5 at baseline, elevated to 1.1 on admission.  With IVF this has improved to 0.67 consistent with a pre renal azotemia.    - Hold Lisinopril  - BMP in AM     3. CAD: NSTEMI that was medically managed in 5/2017.  Currently no CP or SOB.  Will continue PTA medications with hold parameters except decrease Metoprolol dose and hold Lisinopril.     4. Recent Left Hip Fracture: Non displaced fracture.  Non operative.  Continue PRN pain medications.  Follow up orthopedics in clinic.       5. Diffuse Weakness: Likely due to dehydration and diarrhea.  Treatment as above and will consult PT.     6. Neuropathy: Continue Neurontin.     7. Depression: Continue Zoloft.    8. Anemia: Had mild anemia last admission, worse today but this could have been due to hemoconcentration as she was quite dehydrated on admission.  Does have significant bruising on her left wrist from prior fall but it is stable so  "do not expect bleeding into the joint.  No known blood loss.  Will repeat Hgb in AM but no indication for transfusion.    DVT Prophylaxis: Pneumatic Compression Devices  Code Status: Full Code  Discharge Dispo: TCU  Estimated Disch Date / # of Days until Disch: tomorrow as long as Hgb stable        Interval History (Subjective):      Patient was seen with her bedside nurse this morning.  She is feeling a little better today, diarrhea is slowing down.  She is feeling hungry.  Denies nausea, emesis or abdominal pain.  Denies CP or SOB.  She still feels very weak.                  Physical Exam:      Last Vital Signs:  /44 (BP Location: Left arm)  Pulse 65  Temp 97.8  F (36.6  C) (Oral)  Resp 16  Ht 1.6 m (5' 3\")  Wt 56.7 kg (125 lb)  SpO2 91%  BMI 22.14 kg/m2    General: Alert, awake, no acute distress.  HEENT: Normocephalic and atraumatic, eyes anicteric and without scleral injection, EOMI, face symmetric, MMM.  Cardiac: RRR, normal S1, S2. II/CI flow murmur, no g/r, no LE edema.  Pulmonary: Normal chest rise, normal work of breathing.  Lungs CTAB without crackles or wheezing.  Abdomen: soft, non-tender, non-distended.  Normoactive bowel sounds, no guarding or rebound tenderness.  Extremities: no deformities.  Warm, well perfused.  Skin: no rashes or lesions.  Warm and Dry.  Neuro: No focal deficits.  Speech clear.  Coordination and strength grossly normal.  Psych: Alert and oriented x3. Appropriate affect.         Medications:      All current medications were reviewed with changes reflected in problem list.         Data:      All new lab and imaging data was reviewed.   Labs:    Recent Labs  Lab 06/23/17  0700 06/22/17  1129 06/20/17  1259    135 137   POTASSIUM 3.3* 3.4 4.0   CHLORIDE 109 102 105   CO2 24 24 28   ANIONGAP 6 9 4   GLC 93 127* 130*   BUN 32* 40* 20   CR 0.67 1.10* 0.49*   GFRESTIMATED 84 47* >90Non  GFR Calc   GFRESTBLACK >90African American GFR Calc 57* >90African " American GFR Calc   DAVE 7.8* 8.5 8.8       Recent Labs  Lab 06/23/17  0700 06/22/17  1129 06/20/17  1259   WBC 9.0 16.3* 11.7*   HGB 8.7* 10.1* 10.7*   HCT 26.1* 30.6* 32.1*   MCV 94 93 93   PLT 94* 144* 142*      Imaging:   No results found for this or any previous visit (from the past 24 hour(s)).    Amanda Kenyon MD.

## 2017-06-23 NOTE — PROGRESS NOTES
[facility]    Patient Name: Daya Ignacio  [id number]  Services received on: 6/23/2017    MEDICAL MUSIC THERAPY PROGRESS NOTE  INITIAL ASSESSMENT    Referral made by: Kate Phan RN  Referred for: Stimulation/engagement during hospitalization    Session interventions & outcomes: Pt lying in bed while on the phone when approached by therapist for music tx services. Pt declined services at this time and reported interests in turning tv volume up. Therapist assisted pt. Pt thanked therapist.    Follow up: Therapist will follow up with pt as per pt or team request for music therapy services during hospitalization. Therapist onsite on [address] on Mondays ([phone]) and Fridays ([phone]).    Kenya Aguiar MA, MT-BC  Medical Music Therapy Services  [email or web address]

## 2017-06-23 NOTE — PLAN OF CARE
Problem: Diarrhea (Adult)  Goal: Identify Related Risk Factors and Signs and Symptoms  Related risk factors and signs and symptoms are identified upon initiation of Human Response Clinical Practice Guideline (CPG)   Outcome: Improving  Temp max 99.9 (axillary), PO Tylenol x1, 97.8 at recheck. AAO x4, but forgetful. CMS intact to LLE, pain with movement. Tolerating FLD, BS hyperactive, x1 loose smear overnight. Pt up with assist of 1 to commode, alarm on, voiding adequately. Plan for PT consult today.

## 2017-06-23 NOTE — PROGRESS NOTES
Discharge Planner   Discharge Plans in progress: PT has TCU bed for Saturday to [facility]  Barriers to discharge plan: PT is feeling weak  Follow up plan: Pt reported her son will provide transport        Entered by: Corinne C. White 06/23/2017 9:42 AM     CM: Spoke with pt's son Gael is aware that we have TCU bed at [facility]> that she is in [facility] and will have coverage to transfer to TCU   P: Son to provide transport

## 2017-06-23 NOTE — PLAN OF CARE
Problem: Goal Outcome Summary  Goal: Goal Outcome Summary     PT:  Eval complete, treatment initiated.  Pt admitted with diarrhea/weakness, recent admission after a fall and found to have L greater trochanteric fracture, WBAT per ortho. Pt resides in an independent living facility, previously indep PLOF but using a FWW most recently after fall.     Discharge Planner PT   Patient plan for discharge: TCU before returning home to regain strength  Current status: Minimal L hip pain at rest, increases to 6-8/10 with WB tasks.  Min-mod A with bed mobilities due to pain/weakness, min A with transfers, amb in room x 40ft total with FWW and CGA.  Slow gait speed, but tolerated well.  Up to chair end of session.  Advised pt to get up to chair 3x/day with nsg staff, short walks as tolerated with walker.    Barriers to return to prior living situation: unable to mobilize without assistance, fall risk, pain  Recommendations for discharge: TCU  Rationale for recommendations: will benefit from further PT intervention in TCU setting to improve strength and functional mobility training in order to return home to indep living facility and minimize risk for future falls       Entered by: Jluis Cantrell 06/23/2017 4:01 PM

## 2017-06-23 NOTE — PROGRESS NOTES
Your information has been submitted on June 23rd, 2017 at 10:41:03 AM CDT. The confirmation number is [id number]

## 2017-06-23 NOTE — PLAN OF CARE
Problem: Goal Outcome Summary  Goal: Goal Outcome Summary  Outcome: Improving  Still having loose liquids stool but frequency has decreased x2 stools on day shift, up with walker and assist x1 to BSC, encouraged her to be up in chair and increase activity but she refused, explained to her she would see PT today and to make sure to work with them so they could assess her discharge needs, stopped IV fluids, advanced to regular diet, did check c diff which was negative but d/t some fecal incontinence will keep on enteric precautions for now, FR/A for safety had fall with hip fx last week, left hand still swollen and bruised

## 2017-06-24 VITALS
DIASTOLIC BLOOD PRESSURE: 63 MMHG | BODY MASS INDEX: 22.15 KG/M2 | OXYGEN SATURATION: 92 % | WEIGHT: 125 LBS | SYSTOLIC BLOOD PRESSURE: 154 MMHG | HEART RATE: 65 BPM | HEIGHT: 63 IN | TEMPERATURE: 98.1 F | RESPIRATION RATE: 15 BRPM

## 2017-06-24 LAB
ANION GAP SERPL CALCULATED.3IONS-SCNC: 9 MMOL/L (ref 3–14)
BUN SERPL-MCNC: 19 MG/DL (ref 7–30)
CALCIUM SERPL-MCNC: 8.1 MG/DL (ref 8.5–10.1)
CHLORIDE SERPL-SCNC: 109 MMOL/L (ref 94–109)
CO2 SERPL-SCNC: 22 MMOL/L (ref 20–32)
CREAT SERPL-MCNC: 0.56 MG/DL (ref 0.52–1.04)
ERYTHROCYTE [DISTWIDTH] IN BLOOD BY AUTOMATED COUNT: 15.6 % (ref 10–15)
GFR SERPL CREATININE-BSD FRML MDRD: ABNORMAL ML/MIN/1.7M2
GLUCOSE SERPL-MCNC: 85 MG/DL (ref 70–99)
HCT VFR BLD AUTO: 25.9 % (ref 35–47)
HGB BLD-MCNC: 8.4 G/DL (ref 11.7–15.7)
MCH RBC QN AUTO: 30.4 PG (ref 26.5–33)
MCHC RBC AUTO-ENTMCNC: 32.4 G/DL (ref 31.5–36.5)
MCV RBC AUTO: 94 FL (ref 78–100)
PLATELET # BLD AUTO: 118 10E9/L (ref 150–450)
POTASSIUM SERPL-SCNC: 3.6 MMOL/L (ref 3.4–5.3)
POTASSIUM SERPL-SCNC: 4 MMOL/L (ref 3.4–5.3)
RBC # BLD AUTO: 2.76 10E12/L (ref 3.8–5.2)
SODIUM SERPL-SCNC: 140 MMOL/L (ref 133–144)
WBC # BLD AUTO: 6.8 10E9/L (ref 4–11)

## 2017-06-24 PROCEDURE — 99239 HOSP IP/OBS DSCHRG MGMT >30: CPT | Performed by: INTERNAL MEDICINE

## 2017-06-24 PROCEDURE — 36415 COLL VENOUS BLD VENIPUNCTURE: CPT | Performed by: INTERNAL MEDICINE

## 2017-06-24 PROCEDURE — 80048 BASIC METABOLIC PNL TOTAL CA: CPT | Performed by: INTERNAL MEDICINE

## 2017-06-24 PROCEDURE — A9270 NON-COVERED ITEM OR SERVICE: HCPCS | Mod: GY | Performed by: INTERNAL MEDICINE

## 2017-06-24 PROCEDURE — 85027 COMPLETE CBC AUTOMATED: CPT | Performed by: INTERNAL MEDICINE

## 2017-06-24 PROCEDURE — 25000132 ZZH RX MED GY IP 250 OP 250 PS 637: Mod: GY | Performed by: INTERNAL MEDICINE

## 2017-06-24 PROCEDURE — 84132 ASSAY OF SERUM POTASSIUM: CPT | Performed by: INTERNAL MEDICINE

## 2017-06-24 RX ORDER — HYDROCODONE BITARTRATE AND ACETAMINOPHEN 5; 325 MG/1; MG/1
1 TABLET ORAL EVERY 4 HOURS PRN
Qty: 20 TABLET | Refills: 0 | Status: SHIPPED | DISCHARGE
Start: 2017-06-24 | End: 2017-10-18

## 2017-06-24 RX ORDER — LOPERAMIDE HCL 2 MG
2 CAPSULE ORAL 4 TIMES DAILY PRN
Status: DISCONTINUED | OUTPATIENT
Start: 2017-06-24 | End: 2017-06-24 | Stop reason: HOSPADM

## 2017-06-24 RX ORDER — METOPROLOL SUCCINATE 25 MG/1
25 TABLET, EXTENDED RELEASE ORAL DAILY
Qty: 30 TABLET | Refills: 0 | DISCHARGE
Start: 2017-06-24 | End: 2019-02-19 | Stop reason: DRUGHIGH

## 2017-06-24 RX ORDER — HYDROXYZINE PAMOATE 25 MG/1
25 CAPSULE ORAL EVERY 4 HOURS PRN
Qty: 30 CAPSULE | Refills: 0 | DISCHARGE
Start: 2017-06-24 | End: 2017-07-10

## 2017-06-24 RX ORDER — ACETAMINOPHEN 325 MG/1
650 TABLET ORAL EVERY 4 HOURS PRN
Qty: 100 TABLET | Refills: 0 | DISCHARGE
Start: 2017-06-24 | End: 2017-10-18

## 2017-06-24 RX ORDER — LOPERAMIDE HCL 2 MG
2 CAPSULE ORAL 4 TIMES DAILY PRN
Qty: 20 CAPSULE | DISCHARGE
Start: 2017-06-24 | End: 2019-02-19

## 2017-06-24 RX ADMIN — AMLODIPINE BESYLATE 10 MG: 10 TABLET ORAL at 08:18

## 2017-06-24 RX ADMIN — HYDROCODONE BITARTRATE AND ACETAMINOPHEN 1 TABLET: 5; 325 TABLET ORAL at 08:35

## 2017-06-24 RX ADMIN — ISOSORBIDE DINITRATE 10 MG: 10 TABLET ORAL at 08:18

## 2017-06-24 RX ADMIN — HYDROXYZINE HYDROCHLORIDE 25 MG: 25 TABLET ORAL at 08:19

## 2017-06-24 RX ADMIN — LOPERAMIDE HYDROCHLORIDE 2 MG: 2 CAPSULE ORAL at 08:18

## 2017-06-24 RX ADMIN — NYSTATIN 500000 UNITS: 100000 SUSPENSION ORAL at 08:19

## 2017-06-24 RX ADMIN — ACETAMINOPHEN 650 MG: 325 TABLET, FILM COATED ORAL at 00:33

## 2017-06-24 RX ADMIN — HYDROXYZINE HYDROCHLORIDE 25 MG: 25 TABLET ORAL at 00:32

## 2017-06-24 RX ADMIN — SERTRALINE HYDROCHLORIDE 50 MG: 50 TABLET ORAL at 08:19

## 2017-06-24 RX ADMIN — ASPIRIN 81 MG: 81 TABLET, COATED ORAL at 08:18

## 2017-06-24 RX ADMIN — HYDROCODONE BITARTRATE AND ACETAMINOPHEN 1 TABLET: 5; 325 TABLET ORAL at 04:43

## 2017-06-24 RX ADMIN — METOPROLOL SUCCINATE 25 MG: 25 TABLET, EXTENDED RELEASE ORAL at 08:18

## 2017-06-24 RX ADMIN — CLONIDINE HYDROCHLORIDE 0.1 MG: 0.1 TABLET ORAL at 08:19

## 2017-06-24 RX ADMIN — GABAPENTIN 300 MG: 300 CAPSULE ORAL at 08:18

## 2017-06-24 ASSESSMENT — PAIN DESCRIPTION - DESCRIPTORS: DESCRIPTORS: ACHING

## 2017-06-24 NOTE — PROGRESS NOTES
Pt to D/C to [facility].  Pt provided with d/c instructions, including new medications, when medications were last given, and when to take them again.  Pt also informed to f/u with staff at TCU; ortho in 2 weeks.  Pt verbalized understanding of all d/c and f/u instructions.  All questions were answered at this time.  Copy of paperwork sent with pt.  Scripts sent with pt in packet with son.  [facility] to provide transport.  All personal belongings sent with pt.

## 2017-06-24 NOTE — DISCHARGE SUMMARY
[facility]  Discharge Summary  Name: Daya Ignacio    MRN: [id number]  YOB: 1930    Age: 86 year old  Date of Discharge:  6/24/2017  Date of Admission: 6/22/2017  Primary Care Provider: Marcy Soares  Discharge Physician:  Amanda Kenyon MD  Discharging Service:  Hospitalist      Discharge Diagnoses:  1. Diarrhea  2. AYAKA  3. CAD  4. Recent Left Hip Fracture  5. Diffuse Weakness  6. Neuropathy  7. Depression  8. Anemia     Hospital Course:  Daya Ignacio is a 86 year old female with PMH including CAD (NSTEMI 5/2017), HTN, recent UTI and admission (6/19-6/21) for fall at which time she was found to have non displaced left hip fracture (non operative) who was admitted 06/22/17 with profound weakness and diarrhea and was also found to have worsening leukocytosis and AYAKA.  No infectious source of diarrhea, likely related to antibiotics.  AYAKA resolved, will discharge to TCU.     1. Diarrhea: Developed significant diarrhea the day prior to admission.  Has been on antibiotics for UTI.  C diff and enteric panel negative.  Suspect that this is diarrhea related to antibiotics.  Now off antibiotics and diarrhea improving.  Will treat with PRN Imodium.        2. AYAKA - Resolved: Creatinine ~0.5 at baseline, elevated to 1.1 on admission.  Improved to on day of discharge.  This is consistent with pre renal azotemia.      3. CAD: NSTEMI that was medically managed in 5/2017.  Currently no CP or SOB.  PTA medications are being resumed on discharge with exception of lowering Metoprolol dose from 100 mg QD to 25 mg QD.  This can be increased if needed for better blood pressure control at TCU but at time of discharge blood pressure was well controlled.      4. Recent Left Hip Fracture and Left Wrist Injury: Non displaced fracture.  Non operative.  Follow up orthopedics in clinic in 2 weeks.  Wrist without fracture but has significant bruising.  PT and OT at TCU.        5. Diffuse Weakness: Likely due  "to dehydration and diarrhea.  Treatment as above and will consult PT.      6. Neuropathy: Continue Neurontin.      7. Depression: Continue Zoloft.     8. Anemia: Had mild anemia last admission, worse this admission but stable over the past two days.  On day of discharge it was 8.4.  Recommend that she have a repeat CBC in 2 weeks to monitor.     Discharge Disposition:  Discharged to short-term care facility     Allergies:  Allergies   Allergen Reactions     Atorvastatin Muscle Pain (Myalgia)     Macrobid [Nitrofurantoin Anhydrous] Other (See Comments)     Body aches     Penicillins      Sulfa Drugs         Condition on Discharge:  Discharge condition: Stable   Discharge vitals: Blood pressure 126/51, pulse 65, temperature 99.6  F (37.6  C), temperature source Oral, resp. rate 16, height 1.6 m (5' 3\"), weight 56.7 kg (125 lb), SpO2 92 %, not currently breastfeeding.   Code status on discharge: Full Code     History of Illness:  See detailed admission note for full details.    Physical Exam:  Blood pressure 126/51, pulse 65, temperature 99.6  F (37.6  C), temperature source Oral, resp. rate 16, height 1.6 m (5' 3\"), weight 56.7 kg (125 lb), SpO2 92 %, not currently breastfeeding.  Wt Readings from Last 1 Encounters:   06/22/17 56.7 kg (125 lb)     General: Alert, awake, no acute distress.  HEENT: Normocephalic and atraumatic, eyes anicteric and without scleral injection, EOMI, face symmetric, MMM.  Cardiac: RRR, normal S1, S2. II/VI flow murmur, no g/r, no LE edema.  Pulmonary: Normal chest rise, normal work of breathing.  Lungs CTAB without crackles or wheezing.  Abdomen: soft, non-tender, non-distended.  Normoactive bowel sounds, no guarding or rebound tenderness.  Extremities: Left wrist swollen with significant bruising.  Warm, well perfused.  Skin: no rashes or lesions.  Warm and Dry.  Neuro: No focal deficits.  Speech clear.  Coordination and strength grossly normal but diffusely weak.  Psych: Alert and oriented " x3. Appropriate affect.    Procedures other than Imaging:  IVF     Imaging:  Results for orders placed or performed during the hospital encounter of 06/19/17   XR Pelvis and Hip Left 1 View    Narrative    XR PELVIS AND HIP LEFT 1 VIEW  6/19/2017 1:53 PM    HISTORY:  pain, trauma    COMPARISON:  Prior right hip and pelvic images through 11/3/2010.      Impression    IMPRESSION:  Partially visualized right hip arthroplasty without  evidence for complication. There is a compression screw in the left  femoral neck with proximal femoral plate and screw fixation. No  obvious acute complication. Diffuse osteopenia.    MANDI CEBALLOS MD   Wrist XR, G/E 3 views, left    Narrative    XR WRIST LEFT G/E 3 VIEWS    6/19/2017 4:57 PM      HISTORY: pain, trauma    COMPARISON: None.    FINDINGS:  There is normal osseous alignment. There is a very slight  buckle of the medial cortex of the distal radius. This could be due to  a very subtle fracture. Its possible its due to the used epiphyseal  plate. Clinical correlation suggested. There does appear to be soft  tissue swelling over the distal radius region.         Impression    IMPRESSION: Possible nondisplaced fracture distal radius. Clinical  correlation suggested. If clinically indicated, CT could be performed  for further evaluation.    JANIS HOLDER MD   CT Wrist Left w/o Contrast    Narrative    CT WRIST LEFT WITHOUT CONTRAST June 20, 2017 11:04 AM     HISTORY: Rule out possible fracture seen on x-ray.    COMPARISON: 6/19/2017 radiographs.    TECHNIQUE: Radiation dose for this scan was reduced using automated  exposure control, adjustment of the mA and/or kV according to patient  size, or iterative reconstruction technique.    FINDINGS: Cortical irregularity is noted at the junction of the distal  radial epiphysis/metaphysis along the dorsal/radial cortical margin.  This could be related to an old healed fracture. No intramedullary  fracture line is visible. The distal ulna  appears intact. Alignment at  the distal radial ulnar joint appears within normal limits. There is  mild prominence of the scapholunate space which could be related to  normal variation. Scapholunate ligament injury cannot be entirely  excluded. Radiocarpal and midcarpal joint space widths appear within  normal limits. Moderately advanced degenerative arthrosis is noted at  the first carpometacarpal joint. On the distal edge of the scan, there  is a fat-containing lesion, possibly a lipoma distal to the carpal  tunnel.      Impression    IMPRESSION:  1. Distal radial cortical irregularity which may be related to an old  healed fracture. No acute intramedullary fracture line is visible. If  there is continued clinical suspicion for acute distal radial injury,  MRI would be or sensitive for trabecular injury/intramedullary edema.  2. Scapholunate joint space mild prominence. This may be related to  normal variation. However, scapholunate ligament injury is not  entirely excluded. Radiocarpal and midcarpal joint space widths appear  within normal limits.  3. First carpometacarpal joint moderately advanced degenerative  arthrosis.  4. Fat-containing lesion, likely a lipoma, immediately distal to the  carpal tunnel, not completely evaluated with this scan.    LANEY TERRY MD   CT Hip Left w/o Contrast    Narrative    CT HIP LEFT WITHOUT CONTRAST June 20, 2017 11:04 AM     HISTORY: Fall and pain.    TECHNIQUE: Radiation dose for this scan was reduced using automated  exposure control, adjustment of the mA and/or kV according to patient  size, or iterative reconstruction technique.    FINDINGS: The bones are diffusely osteopenic. Right total hip  arthroplasty with longstem femoral component is noted. The tip of this  femoral stem extends off the inferior border of the scan. At the left  hip, there is a dynamic compression screw and lateral side plate.  Although the intertrochanteric fracture subtended by the  compression  screw appears to be healed, there appears to be an acute or subacute  fracture across the base of the left greater trochanter with extension  into the anterior cortex of the intertrochanteric region. Adjacent  soft tissue edema and possible soft tissue hematoma is noted. There is  deformity of the right superior and inferior pubic rami likely related  to healed fractures. The remainder of the osseous pelvis and sacrum  appears to be grossly intact.      Impression    IMPRESSION:  1. Left hip dynamic compression screw and sideplate subtending what  appears to be a healed intertrochanteric fracture.  2. Left hip superimposed acute or subacute greater trochanteric  fracture with extension into the anterior cortex of the  intertrochanteric region. Adjacent soft tissue edema and/or hemorrhage  is noted.  3. Right superior/inferior pubic rami deformity compatible with old  healed fracture.  4. Right total hip arthroplasty.  5. Diffuse osteopenia.    LANEY TERRY MD        Consultations:  Consultations This Hospital Stay   PHYSICAL THERAPY ADULT IP CONSULT  PHYSICAL THERAPY ADULT IP CONSULT  OCCUPATIONAL THERAPY ADULT IP CONSULT     Recent Lab Results:    Recent Labs  Lab 06/24/17  0619 06/23/17  0700 06/22/17  1129   WBC 6.8 9.0 16.3*   HGB 8.4* 8.7* 10.1*   HCT 25.9* 26.1* 30.6*   MCV 94 94 93   * 94* 144*       Recent Labs  Lab 06/24/17  0619 06/24/17  0222 06/23/17  0700 06/22/17  1129     --  139 135   POTASSIUM 3.6 4.0 3.3* 3.4   CHLORIDE 109  --  109 102   CO2 22  --  24 24   ANIONGAP 9  --  6 9   GLC 85  --  93 127*   BUN 19  --  32* 40*   CR 0.56  --  0.67 1.10*   GFRESTIMATED >90Non  GFR Calc  --  84 47*   GFRESTBLACK >90African American GFR Calc  --  >90African American GFR Calc 57*   DAVE 8.1*  --  7.8* 8.5          Pending Results:    Unresulted Labs Ordered in the Past 30 Days of this Admission     No orders found from 4/23/2017 to 6/23/2017.           Discharge  Instructions and Follow-Up:   Discharge Orders     General info for SNF   Length of Stay Estimate: Short Term Care: Estimated # of Days <30  Condition at Discharge: Improving  Level of care:skilled   Rehabilitation Potential: Good  Admission H&P remains valid and up-to-date: Yes  Recent Chemotherapy: N/A  Use Nursing Home Standing Orders: Yes     Mantoux instructions   Give two-step Mantoux (PPD) Per Facility Policy Yes     Reason for your hospital stay   You were hospitalized for diarrhea and weakness.     Follow Up and recommended labs and tests   Follow up with orthopedics in clinic in 2 weeks to follow up on the hip fracture.     Activity - Up with assistive device     Patient care order   CBC should be done in 2 weeks to monitor Hgb.     Full Code     Physical Therapy Adult Consult   Evaluate and treat as clinically indicated.    Reason:  Diffuse weakness, left non displaced hip fracture (non operative)     Occupational Therapy Adult Consult   Evaluate and treat as clinically indicated.    Reason:  Diffuse weakness     Advance Diet as Tolerated   Follow this diet upon discharge: Orders Placed This Encounter     Room Service     Advance Diet as Tolerated: Regular Diet Adult       Discharge Medications   Current Discharge Medication List      START taking these medications    Details   acetaminophen (TYLENOL) 325 MG tablet Take 2 tablets (650 mg) by mouth every 4 hours as needed for mild pain  Qty: 100 tablet, Refills: 0    Associated Diagnoses: Closed fracture of trochanter of left femur, initial encounter (H)      loperamide (IMODIUM) 2 MG capsule Take 1 capsule (2 mg) by mouth 4 times daily as needed for diarrhea  Qty: 20 capsule    Associated Diagnoses: Diarrhea, unspecified type      hydrOXYzine (VISTARIL) 25 MG capsule Take 1 capsule (25 mg) by mouth every 4 hours as needed for itching or other (pain adjuvunct)  Qty: 30 capsule, Refills: 0    Associated Diagnoses: Closed fracture of trochanter of left  femur, initial encounter (H)         CONTINUE these medications which have CHANGED    Details   HYDROcodone-acetaminophen (NORCO) 5-325 MG per tablet Take 1 tablet by mouth every 4 hours as needed for moderate to severe pain  Qty: 20 tablet, Refills: 0    Associated Diagnoses: Hip pain, left      metoprolol (TOPROL-XL) 25 MG 24 hr tablet Take 1 tablet (25 mg) by mouth daily  Qty: 30 tablet, Refills: 0    Associated Diagnoses: HTN (hypertension), benign         CONTINUE these medications which have NOT CHANGED    Details   hydrOXYzine (ATARAX) 25 MG tablet Take 1 tablet (25 mg) by mouth every 4 hours as needed (pain)  Qty: 20 tablet, Refills: 0    Associated Diagnoses: Hip pain, left      nystatin (MYCOSTATIN) 118610 UNIT/ML suspension Swish and spit 5 mLs (500,000 Units) in mouth 4 times daily for 9 days  Qty: 180 mL, Refills: 0    Associated Diagnoses: Oral thrush      estradiol (ESTRACE) 0.1 MG/GM cream Place 2 g vaginally three times a week paraben-free  Qty: 6 g, Refills: 1    Associated Diagnoses: Symptomatic menopausal or female climacteric states      Cholecalciferol (VITAMIN D3) 2000 UNITS CAPS Take 2,000 Units by mouth daily (with dinner)      Multiple Vitamins-Minerals (PRESERVISION AREDS) CAPS Take 1 capsule by mouth 2 times daily      cyanocobalamin (VITAMIN  B-12) 1000 MCG tablet Take 1,000 mcg by mouth every morning      aspirin EC 81 MG EC tablet Take 1 tablet (81 mg) by mouth daily  Qty: 30 tablet, Refills: 3    Associated Diagnoses: Non-STEMI (non-ST elevated myocardial infarction) (H)      isosorbide dinitrate (ISORDIL) 10 MG tablet Take 1 tablet (10 mg) by mouth 3 times daily  Qty: 90 tablet, Refills: 3    Associated Diagnoses: Non-STEMI (non-ST elevated myocardial infarction) (H)      amLODIPine (NORVASC) 10 MG tablet Take 1 tablet (10 mg) by mouth daily  Qty: 30 tablet, Refills: 3    Associated Diagnoses: Hypertension goal BP (blood pressure) < 140/90      gabapentin (NEURONTIN) 300 MG capsule  TAKE ONE CAPSULE BY MOUTH THREE TIMES A DAY  Qty: 180 capsule, Refills: 1    Associated Diagnoses: Neuropathy (H)      sertraline (ZOLOFT) 50 MG tablet Take 1 tablet (50 mg) by mouth daily  Qty: 90 tablet, Refills: 1    Associated Diagnoses: Generalized anxiety disorder      lisinopril (PRINIVIL/ZESTRIL) 40 MG tablet Take 1 tablet (40 mg) by mouth daily  Qty: 90 tablet, Refills: 1    Associated Diagnoses: Essential hypertension      cloNIDine (CATAPRES) 0.1 MG tablet Take 1 tablet (0.1 mg) by mouth 2 times daily  Qty: 180 tablet, Refills: 2    Associated Diagnoses: Essential hypertension         STOP taking these medications       ciprofloxacin (CIPRO) 250 MG tablet Comments:   Reason for Stopping:             Time Spent on this Encounter   I, Amanda Kenyon, personally saw the patient today and spent greater than 30 minutes discharging this patient.    Amanda Kenyon MD

## 2017-06-24 NOTE — PLAN OF CARE
Problem: Goal Outcome Summary  Goal: Goal Outcome Summary  PRIMARY DIAGNOSIS:Weakness and Diarrhea  Primary Symptoms: Loose stool+ pain and unsteady gait      GOALS TO BE MET BEFORE DISCHARGE:     1. Orthostatic symptoms (BP decrease or HR increase with patient upright)?  No  2. Vital Signs stable? Yes. Except low grade temp of 99.6  3. Documented urine output: Yes, voiding adequautely w/o complains  4. Tolerating PO fluid: Yes, on a regular diet  5. Symptoms improved: No. Still having loose stool but not incontinet  6. Labs WNL? Yes. Potassium replaced with a recheck of 4.0  7. ADLs back to baseline?  No. Pt still assist of 1 with a walker.   8. Activity and level of assistance: Up with maximum assistance of 1 and a walker.   9. Pain status: Improved-controlled with oral pain medications. Pain max 8/10. Atarax and percocet give. Pain to L hip and R rib area  10. Barriers to discharge noted Yes , Pain 8/10, continued diarrhea. Unsteady and continues to use 1A with a walker. Pt d/c today to TCU. Transport  by son. Pt aware and acceptable to plan     Interpretation of rhythm per telemetry tech:   n/a

## 2017-06-24 NOTE — PLAN OF CARE
Problem: Goal Outcome Summary  Goal: Goal Outcome Summary  Physical Therapy Discharge Summary     Reason for therapy discharge:    Discharged to transitional care facility.     Progress towards therapy goal(s). See goals on Care Plan in Epic electronic health record for goal details.  Goals not met.  Barriers to achieving goals:   discharge from facility.     Therapy recommendation(s):    Continued therapy is recommended.  Rationale/Recommendations:  Progress mobility at TCU.      Note: Documenting PT did not see pt on this date. Information obtained from chart review.

## 2017-06-24 NOTE — PLAN OF CARE
Problem: Goal Outcome Summary  Goal: Goal Outcome Summary  Outcome: No Change  Pt continues to be hospitalized for diarrhea. x3 loose stools this shift, continues on contact. C-diff negative. K+ 3.3, replaced this evening. Up with assist of 1 and walker. D/c possible tomorrow.

## 2017-06-26 ENCOUNTER — NURSING HOME VISIT (OUTPATIENT)
Dept: GERIATRICS | Facility: CLINIC | Age: 82
End: 2017-06-26
Payer: MEDICARE

## 2017-06-26 VITALS
TEMPERATURE: 98.4 F | OXYGEN SATURATION: 96 % | WEIGHT: 166.4 LBS | HEART RATE: 97 BPM | SYSTOLIC BLOOD PRESSURE: 149 MMHG | DIASTOLIC BLOOD PRESSURE: 79 MMHG | RESPIRATION RATE: 16 BRPM | BODY MASS INDEX: 29.48 KG/M2 | HEIGHT: 63 IN

## 2017-06-26 DIAGNOSIS — I50.22 CHRONIC SYSTOLIC CONGESTIVE HEART FAILURE (H): ICD-10-CM

## 2017-06-26 DIAGNOSIS — S72.002D CLOSED LEFT HIP FRACTURE, WITH ROUTINE HEALING, SUBSEQUENT ENCOUNTER: Primary | ICD-10-CM

## 2017-06-26 DIAGNOSIS — F32.A DEPRESSION, UNSPECIFIED DEPRESSION TYPE: ICD-10-CM

## 2017-06-26 DIAGNOSIS — I10 HYPERTENSION GOAL BP (BLOOD PRESSURE) < 140/90: ICD-10-CM

## 2017-06-26 DIAGNOSIS — R53.81 PHYSICAL DECONDITIONING: ICD-10-CM

## 2017-06-26 DIAGNOSIS — G62.9 NEUROPATHY: ICD-10-CM

## 2017-06-26 DIAGNOSIS — D64.9 ANEMIA, UNSPECIFIED TYPE: ICD-10-CM

## 2017-06-26 DIAGNOSIS — I25.10 CORONARY ARTERY DISEASE INVOLVING NATIVE HEART WITHOUT ANGINA PECTORIS, UNSPECIFIED VESSEL OR LESION TYPE: ICD-10-CM

## 2017-06-26 DIAGNOSIS — S69.92XD LEFT WRIST INJURY, SUBSEQUENT ENCOUNTER: ICD-10-CM

## 2017-06-26 DIAGNOSIS — R19.7 DIARRHEA, UNSPECIFIED TYPE: ICD-10-CM

## 2017-06-26 PROCEDURE — 99207 ZZC CDG-CORRECTLY CODED, REVIEWED AND AGREE: CPT | Performed by: NURSE PRACTITIONER

## 2017-06-26 PROCEDURE — 99310 SBSQ NF CARE HIGH MDM 45: CPT | Performed by: NURSE PRACTITIONER

## 2017-06-26 NOTE — PROGRESS NOTES
"[place] [facility]  PRIMARY CARE PROVIDER AND CLINIC:  Marcy Soares [address] / [facility] 5*  Chief Complaint   Patient presents with     Hospital F/U       HPI:    Daya Ignacio is a 86 year old  (10/11/1930),admitted to the [facility] from Hospital  [facility].  Hospital stay 06/22/2017 through 06/24/2017.  Admitted to this facility for  rehab, medical management and nursing care.     Hospital Course:  \"Daya Ignacio is a 86 year old female with PMH including CAD (NSTEMI 5/2017), HTN, recent UTI and admission (6/19-6/21) for fall at which time she was found to have non displaced left hip fracture (non operative) who was admitted 06/22/17 with profound weakness and diarrhea and was also found to have worsening leukocytosis and AYAKA.  No infectious source of diarrhea, likely related to antibiotics.  AYAKA resolved, will discharge to TCU.      1. Diarrhea: Developed significant diarrhea the day prior to admission.  Has been on antibiotics for UTI.  C diff and enteric panel negative.  Suspect that this is diarrhea related to antibiotics.  Now off antibiotics and diarrhea improving.  Will treat with PRN Imodium.        2. AYAKA - Resolved: Creatinine ~0.5 at baseline, elevated to 1.1 on admission.  Improved to on day of discharge.  This is consistent with pre renal azotemia.      3. CAD: NSTEMI that was medically managed in 5/2017.  Currently no CP or SOB.  PTA medications are being resumed on discharge with exception of lowering Metoprolol dose from 100 mg QD to 25 mg QD.  This can be increased if needed for better blood pressure control at TCU but at time of discharge blood pressure was well controlled.      4. Recent Left Hip Fracture and Left Wrist Injury: Non displaced fracture.  Non operative.  Follow up orthopedics in clinic in 2 weeks.  Wrist without fracture but has significant bruising.  PT and OT at TCU.        5. " "Diffuse Weakness: Likely due to dehydration and diarrhea.  Treatment as above and will consult PT.      6. Neuropathy: Continue Neurontin.      7. Depression: Continue Zoloft.      8. Anemia: Had mild anemia last admission, worse this admission but stable over the past two days.  On day of discharge it was 8.4.  Recommend that she have a repeat CBC in 2 weeks to monitor.\"    -------------------------------------------    Alert, calm, NAD. Sitting up in room in w/c. Reports some LLE pain, particularly with activity that is well relieved with prn Norco. Patient states tolerated rehab therapy well. Denies SOB, chest pain. Denies dizziness. Reports some \"dribbling urine\" - PVRs tested by nursing and 100-200 range. Patient denies dysuria, frequency, fever or chills. States did have some diarrhea yesterday- but none today. Denies abd pain, cramping, n/v. Reports eating and sleeping okay.     CODE STATUS/ADVANCE DIRECTIVES DISCUSSION:   CPR/Full code   Patient's living condition: lives alone    ALLERGIES:Atorvastatin; Macrobid [nitrofurantoin anhydrous]; Penicillins; and Sulfa drugs  PAST MEDICAL HISTORY:  has a past medical history of 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 (4/16/2014); Closed fracture of unspecified part of neck of femur; Closed fracture of unspecified part of vertebral column without mention of spinal cord injury; Elevated blood pressure reading without diagnosis of hypertension; Hypertension; and Macular degeneration (senile) of retina, unspecified.  PAST SURGICAL HISTORY:  has a past surgical history that includes NONSPECIFIC PROCEDURE and orthopedic surgery.  FAMILY HISTORY: family history includes Alzheimer Disease in her mother; Cardiovascular in her father; Neurologic Disorder (age of onset: 83) in her brother.  SOCIAL HISTORY:  reports that she has never smoked. She has never used smokeless tobacco. She reports that she does not drink alcohol or use illicit drugs.    Post Discharge Medication " Reconciliation Status: discharge medications reconciled and changed, per note/orders (see AVS).  Current Outpatient Prescriptions   Medication Sig Dispense Refill     HYDROcodone-acetaminophen (NORCO) 5-325 MG per tablet Take 1 tablet by mouth every 4 hours as needed for moderate to severe pain 20 tablet 0     acetaminophen (TYLENOL) 325 MG tablet Take 2 tablets (650 mg) by mouth every 4 hours as needed for mild pain 100 tablet 0     loperamide (IMODIUM) 2 MG capsule Take 1 capsule (2 mg) by mouth 4 times daily as needed for diarrhea 20 capsule      metoprolol (TOPROL-XL) 25 MG 24 hr tablet Take 1 tablet (25 mg) by mouth daily 30 tablet 0     hydrOXYzine (VISTARIL) 25 MG capsule Take 1 capsule (25 mg) by mouth every 4 hours as needed for itching or other (pain adjuvunct) 30 capsule 0     hydrOXYzine (ATARAX) 25 MG tablet Take 1 tablet (25 mg) by mouth every 4 hours as needed (pain) 20 tablet 0     nystatin (MYCOSTATIN) 349508 UNIT/ML suspension Swish and spit 5 mLs (500,000 Units) in mouth 4 times daily for 9 days 180 mL 0     estradiol (ESTRACE) 0.1 MG/GM cream Place 2 g vaginally three times a week paraben-free 6 g 1     Cholecalciferol (VITAMIN D3) 2000 UNITS CAPS Take 2,000 Units by mouth daily (with dinner)       Multiple Vitamins-Minerals (PRESERVISION AREDS) CAPS Take 1 capsule by mouth 2 times daily       cyanocobalamin (VITAMIN  B-12) 1000 MCG tablet Take 1,000 mcg by mouth every morning       aspirin EC 81 MG EC tablet Take 1 tablet (81 mg) by mouth daily 30 tablet 3     isosorbide dinitrate (ISORDIL) 10 MG tablet Take 1 tablet (10 mg) by mouth 3 times daily 90 tablet 3     amLODIPine (NORVASC) 10 MG tablet Take 1 tablet (10 mg) by mouth daily 30 tablet 3     gabapentin (NEURONTIN) 300 MG capsule TAKE ONE CAPSULE BY MOUTH THREE TIMES A  capsule 1     sertraline (ZOLOFT) 50 MG tablet Take 1 tablet (50 mg) by mouth daily 90 tablet 1     lisinopril (PRINIVIL/ZESTRIL) 40 MG tablet Take 1 tablet (40 mg)  "by mouth daily 90 tablet 1     cloNIDine (CATAPRES) 0.1 MG tablet Take 1 tablet (0.1 mg) by mouth 2 times daily 180 tablet 2       ROS:  4 point ROS including Respiratory, CV, GI and , other than that noted in the HPI,  is negative    Exam:  /79  Pulse 97  Temp 98.4  F (36.9  C)  Resp 16  Ht 5' 3\" (1.6 m)  Wt 166 lb 6.4 oz (75.5 kg)  SpO2 96%  BMI 29.48 kg/m2    GENERAL APPEARANCE: Alert, in no distress   ENT: Mouth and posterior oropharynx normal, moist mucous membranes   EYES: EOM, conjunctivae, lids, pupils and irises normal   NECK: No adenopathy,masses or thyromegaly   RESP: respiratory effort and palpation of chest normal, Lungs clear to auscultation  CV: Palpation and auscultation of heart done , regular rate and rhythm, no murmur, rub, or gallop, peripheral edema 1+ LE edema  ABDOMEN: normal bowel sounds, soft, nontender, no hepatosplenomegaly or other masses   : palpation of bladder WNL   M/S: Gait and station normal   Digits and nails normal - KAHN  SKIN: Inspection of skin and subcutaneous tissue baseline, Palpation of skin and subcutaneous tissue baseline,   NEURO: Cranial nerves 2-12 are normal tested and grossly at patient's baseline, Examination of sensation by touch normal   PSYCH: oriented X 3, affect and mood normal              BP 06/24-06/26: 133-174/65-79mmHg    Lab/Diagnostic data:    CBC RESULTS:   Recent Labs   Lab Test  06/24/17 0619 06/23/17   0700   WBC  6.8  9.0   RBC  2.76*  2.79*   HGB  8.4*  8.7*   HCT  25.9*  26.1*   MCV  94  94   MCH  30.4  31.2   MCHC  32.4  33.3   RDW  15.6*  15.5*   PLT  118*  94*       Last Basic Metabolic Panel:  Recent Labs   Lab Test  06/24/17 0619 06/24/17 0222  06/23/17   0700   NA  140   --   139   POTASSIUM  3.6  4.0  3.3*   CHLORIDE  109   --   109   DAVE  8.1*   --   7.8*   CO2  22   --   24   BUN  19   --   32*   CR  0.56   --   0.67   GLC  85   --   93       Liver Function Studies -   Recent Labs   Lab Test  06/20/17   1259  " 05/28/17   1115   PROTTOTAL  6.9  7.3   ALBUMIN  3.5  3.6   BILITOTAL  1.5*  1.6*   ALKPHOS  78  69   AST  11  33   ALT  19  18     Lab Results   Component Value Date    A1C 5.3 01/13/2017    A1C 5.2 08/08/2015     ASSESSMENT/PLAN:  Closed left hip fracture (H)  - non operative. WBAT. PT/OT. Continue Norco, Atarax, and ice prn, repositioning for pain control. F/w ortho as directed    Left wrist injury  - no fracture, some bruising. Continue current pain regimen as above. Monitor    Diarrhea  - slowing. W/u negative inpatient  - continue prn imodium. Monitor bowel pattern    Anemia  - HGB 8s of late  - monitor for s/s bleeding  - recheck CBC    Congestive heart failure (H)  Essential HTN  Edema  - EF 55-60%, mod pulm HTN  - continue on metoprolol, lisinopril and norvasc, clonidine with hold parameters  - add knee high TEDs during day  - follow weights    CAD  H/o NSTEMI 5/2017  - no active s/s  - continue on ASA, Imdur, metop, lisinopril  - follow clinically    Neuropathy (H)  - continue on gabapentin    Depression  *- mood stable at this time  - continue on Zoloft    Physical deconditioning  - 2/2 above  - lives alone  - PT/OT  - ongoing discharge planning, SW follow and care conferences per unit protocol        Information reviewed:  Medications, vital signs, orders, nursing notes, problem list, hospital information. Total time spent with patient visit was 45 min including patient visit and review of past records. Greater than 50% of total time spent with counseling and coordinating care.    Electronically signed by:  KARTHIK Gaspar CNP

## 2017-06-28 ENCOUNTER — HOSPITAL LABORATORY (OUTPATIENT)
Dept: OTHER | Facility: CLINIC | Age: 82
End: 2017-06-28

## 2017-06-28 ENCOUNTER — NURSING HOME VISIT (OUTPATIENT)
Dept: GERIATRICS | Facility: CLINIC | Age: 82
End: 2017-06-28
Payer: MEDICARE

## 2017-06-28 VITALS
BODY MASS INDEX: 29.48 KG/M2 | OXYGEN SATURATION: 96 % | SYSTOLIC BLOOD PRESSURE: 152 MMHG | HEART RATE: 84 BPM | WEIGHT: 166.4 LBS | HEIGHT: 63 IN | TEMPERATURE: 97.6 F | DIASTOLIC BLOOD PRESSURE: 82 MMHG | RESPIRATION RATE: 16 BRPM

## 2017-06-28 DIAGNOSIS — R19.7 DIARRHEA, UNSPECIFIED TYPE: Primary | ICD-10-CM

## 2017-06-28 DIAGNOSIS — F41.1 GENERALIZED ANXIETY DISORDER: ICD-10-CM

## 2017-06-28 DIAGNOSIS — S72.102D CLOSED FRACTURE OF TROCHANTER OF LEFT FEMUR WITH ROUTINE HEALING, SUBSEQUENT ENCOUNTER: ICD-10-CM

## 2017-06-28 DIAGNOSIS — I10 ESSENTIAL HYPERTENSION: ICD-10-CM

## 2017-06-28 DIAGNOSIS — R53.81 PHYSICAL DECONDITIONING: ICD-10-CM

## 2017-06-28 LAB
ANION GAP SERPL CALCULATED.3IONS-SCNC: 8 MMOL/L (ref 3–14)
BASOPHILS # BLD AUTO: 0 10E9/L (ref 0–0.2)
BASOPHILS NFR BLD AUTO: 0 %
BUN SERPL-MCNC: 13 MG/DL (ref 7–30)
CALCIUM SERPL-MCNC: 8.8 MG/DL (ref 8.5–10.1)
CHLORIDE SERPL-SCNC: 101 MMOL/L (ref 94–109)
CO2 SERPL-SCNC: 28 MMOL/L (ref 20–32)
CREAT SERPL-MCNC: 0.45 MG/DL (ref 0.52–1.04)
DIFFERENTIAL METHOD BLD: ABNORMAL
EOSINOPHIL # BLD AUTO: 0.1 10E9/L (ref 0–0.7)
EOSINOPHIL NFR BLD AUTO: 1 %
ERYTHROCYTE [DISTWIDTH] IN BLOOD BY AUTOMATED COUNT: 15.7 % (ref 10–15)
GFR SERPL CREATININE-BSD FRML MDRD: ABNORMAL ML/MIN/1.7M2
GLUCOSE SERPL-MCNC: 95 MG/DL (ref 70–99)
HCT VFR BLD AUTO: 30.4 % (ref 35–47)
HGB BLD-MCNC: 10 G/DL (ref 11.7–15.7)
LYMPHOCYTES # BLD AUTO: 1.9 10E9/L (ref 0.8–5.3)
LYMPHOCYTES NFR BLD AUTO: 17 %
MCH RBC QN AUTO: 29.8 PG (ref 26.5–33)
MCHC RBC AUTO-ENTMCNC: 32.9 G/DL (ref 31.5–36.5)
MCV RBC AUTO: 91 FL (ref 78–100)
METAMYELOCYTES # BLD: 0.3 10E9/L
METAMYELOCYTES NFR BLD MANUAL: 3 %
MONOCYTES # BLD AUTO: 0.1 10E9/L (ref 0–1.3)
MONOCYTES NFR BLD AUTO: 1 %
NEUTROPHILS # BLD AUTO: 8.9 10E9/L (ref 1.6–8.3)
NEUTROPHILS NFR BLD AUTO: 78 %
PLATELET # BLD AUTO: 281 10E9/L (ref 150–450)
PLATELET # BLD EST: ABNORMAL 10*3/UL
POTASSIUM SERPL-SCNC: 3.1 MMOL/L (ref 3.4–5.3)
RBC # BLD AUTO: 3.36 10E12/L (ref 3.8–5.2)
RBC MORPH BLD: ABNORMAL
SODIUM SERPL-SCNC: 137 MMOL/L (ref 133–144)
WBC # BLD AUTO: 11.4 10E9/L (ref 4–11)

## 2017-06-28 PROCEDURE — 99306 1ST NF CARE HIGH MDM 50: CPT | Performed by: INTERNAL MEDICINE

## 2017-06-28 PROCEDURE — 99207 ZZC CDG-CORRECTLY CODED, REVIEWED AND AGREE: CPT | Performed by: INTERNAL MEDICINE

## 2017-06-28 NOTE — PROGRESS NOTES
PRIMARY CARE PROVIDER AND CLINIC RESPONSIBLE:  Marcy Soares, [facility] [id number] [place] [address] / [facility] 5*        ADMISSION HISTORY AND PHYSICAL EXAMINATION     Chief Complaint   Patient presents with     Hospital F/U         HISTORY OF PRESENT ILLNESS:  86 year old female, (10/11/1930), [facility] medical care and rehab.    Pt admitted [facility] 6/22 to 6/24 for for weakness and diarrhea. Prior to that was admitted 6/19 to 6/21 for non-op L hip fx.    Diarrhea has resolved. No chest pain/SOB/N/V/fever/chills. Feels legs are swollen.    Patient is seen and examined by me with Morena Stanley CNP. Please see Morena Stanley's admit noted dated 6/26 for details of admission, past medical history, family history, allergies, medication list, social history and other details pertinent with this admission. Hospital admission and dc summary reviewed.      Past Medical History:   Diagnosis Date     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 4/16/2014     Closed fracture of unspecified part of neck of femur      Closed fracture of unspecified part of vertebral column without mention of spinal cord injury     from fall summer 06     Elevated blood pressure reading without diagnosis of hypertension      Hypertension      Macular degeneration (senile) of retina, unspecified        Past Surgical History:   Procedure Laterality Date     C NONSPECIFIC PROCEDURE      lt hip pain     ORTHOPEDIC SURGERY         Current Outpatient Prescriptions   Medication Sig     HYDROcodone-acetaminophen (NORCO) 5-325 MG per tablet Take 1 tablet by mouth every 4 hours as needed for moderate to severe pain     acetaminophen (TYLENOL) 325 MG tablet Take 2 tablets (650 mg) by mouth every 4 hours as needed for mild pain     loperamide (IMODIUM) 2 MG capsule Take 1 capsule (2 mg) by mouth 4 times daily as needed for diarrhea     metoprolol (TOPROL-XL) 25 MG 24 hr tablet Take 1 tablet (25  mg) by mouth daily     hydrOXYzine (ATARAX) 25 MG tablet Take 1 tablet (25 mg) by mouth every 4 hours as needed (pain) (Patient taking differently: Take 25 mg by mouth every 8 hours as needed (pain) )     nystatin (MYCOSTATIN) 861934 UNIT/ML suspension Swish and spit 5 mLs (500,000 Units) in mouth 4 times daily for 9 days     estradiol (ESTRACE) 0.1 MG/GM cream Place 2 g vaginally three times a week paraben-free     Cholecalciferol (VITAMIN D3) 2000 UNITS CAPS Take 2,000 Units by mouth daily (with dinner)     Multiple Vitamins-Minerals (PRESERVISION AREDS) CAPS Take 1 capsule by mouth 2 times daily     cyanocobalamin (VITAMIN  B-12) 1000 MCG tablet Take 1,000 mcg by mouth every morning     aspirin EC 81 MG EC tablet Take 1 tablet (81 mg) by mouth daily     isosorbide dinitrate (ISORDIL) 10 MG tablet Take 1 tablet (10 mg) by mouth 3 times daily     amLODIPine (NORVASC) 10 MG tablet Take 1 tablet (10 mg) by mouth daily     gabapentin (NEURONTIN) 300 MG capsule TAKE ONE CAPSULE BY MOUTH THREE TIMES A DAY     sertraline (ZOLOFT) 50 MG tablet Take 1 tablet (50 mg) by mouth daily     lisinopril (PRINIVIL/ZESTRIL) 40 MG tablet Take 1 tablet (40 mg) by mouth daily     cloNIDine (CATAPRES) 0.1 MG tablet Take 1 tablet (0.1 mg) by mouth 2 times daily     hydrOXYzine (VISTARIL) 25 MG capsule Take 1 capsule (25 mg) by mouth every 4 hours as needed for itching or other (pain adjuvunct)     No current facility-administered medications for this visit.        Allergies   Allergen Reactions     Atorvastatin Muscle Pain (Myalgia)     Macrobid [Nitrofurantoin Anhydrous] Other (See Comments)     Body aches     Penicillins      Sulfa Drugs        Social History     Social History     Marital status: Single     Spouse name: N/A     Number of children: N/A     Years of education: N/A     Occupational History     Not on file.     Social History Main Topics     Smoking status: Never Smoker     Smokeless tobacco: Never Used     Alcohol use No  "    Drug use: No     Sexual activity: No     Other Topics Concern     Not on file     Social History Narrative          Information reviewed:  Medications, vital signs, orders, nursing notes, problem list, hospital information.     ROS: All 10 point review of system completed, those pertinent positive, please see H&P, the remaining ROS is negative.    /82  Pulse 84  Temp 97.6  F (36.4  C)  Resp 16  Ht 5' 3\" (1.6 m)  Wt 166 lb 6.4 oz (75.5 kg)  SpO2 96%  BMI 29.48 kg/m2    PHYSICAL EXAMINATION:   GENERAL:  No acute distress. Laying in bed.  SKIN:  Dry and warm.  There is no rash, lesions, ulcers or juandice at area of skin examined. + scattered ecchymosis .  HEENT:  Head without trauma.  Pupils round, reactive. Exam of conjunctiva and lids are normal. Sclera without icterus. There is no oral thrush.  NECK:  Supple.  There is no cervical adenopathy, no thyromegaly. No jugular venous distension.  CHEST: No reproducible chest tenderness.   LUNGS:  Normal respiratory effort. Lungs are Clear on ascultation.  HEART:  Regular rate and rhythm.  No murmur, gallops or rubs auscultated.  ABDOMEN:  Soft, bowel sounds positive.  There is no tenderness or guarding.   EXTREMITIES: 1+ BLE edema.  NEUROLOGIC:  Alert and oriented x3.      Lab/Diagnostic data:  Reviewed    Lab Results   Component Value Date    WBC 11.4 06/28/2017     Lab Results   Component Value Date    RBC 3.36 06/28/2017     Lab Results   Component Value Date    HGB 10.0 06/28/2017     Lab Results   Component Value Date    HCT 30.4 06/28/2017     Lab Results   Component Value Date    MCV 91 06/28/2017     Lab Results   Component Value Date    MCH 29.8 06/28/2017     Lab Results   Component Value Date    MCHC 32.9 06/28/2017     Lab Results   Component Value Date    RDW 15.7 06/28/2017     Lab Results   Component Value Date     06/28/2017       Last Basic Metabolic Panel:  Lab Results   Component Value Date     06/28/2017      Lab Results "   Component Value Date    POTASSIUM 3.1 06/28/2017     Lab Results   Component Value Date    CHLORIDE 101 06/28/2017     Lab Results   Component Value Date    DAVE 8.8 06/28/2017     Lab Results   Component Value Date    CO2 28 06/28/2017     Lab Results   Component Value Date    BUN 13 06/28/2017     Lab Results   Component Value Date    CR 0.45 06/28/2017     Lab Results   Component Value Date    GLC 95 06/28/2017         ASSESSMENT / PLAN:     Diarrhea, unspecified type  - c diff negative.  - Improved with imodium.    Closed fracture of trochanter of left femur with routine healing, subsequent encounter  - CT L hip 6/20 showed greater trochanteric fx with extension into intertrochanteric region.  - Non-op management.  - WBAT.  - Pain control.  - f/u with ortho as scheduled.    Generalized anxiety disorder  - On zoloft.    Essential hypertension  - On metoprolol, lisinopril, norvasc  and clonidine.    CAD, without angina.  - On ASA, isordil, metoprolol and norvasc.  - Cath 5/2017 showed no lesion that warranted PCI.    Peripheral neuropathy.  - On gabapentin.    Physical deconditioning  -Plan: PT/OT, fall precautions. Care conference with patient and family for the progress of rehab and disposition issues will be discussed as planned. Rehab evaluation and other evaluations including CPT are at rehab logs, to be reviewed separately.  Fall risk assessment as well as cognitive evaluation will be formed during rehab stay if indicated.      Other problems with same care. Primary care doctor and other specialists to address those chronic problems in next clinic appointment to be scheduled upon discharge from the TCU.    Total time spent with patient visit was 45 min including patient visit, review of past records, 1/2 time on patients counseling and coordinating care.        Shaan Harden MD

## 2017-06-28 NOTE — PROGRESS NOTES
Transition Communication Hand-off for Care Transitions to Next Level of Care Provider    Name: Daya Ignacio  MRN #: [id number]  Primary Care Provider: Marcy Soares PA-C     Primary Clinic: [address] [address]     Reason for Hospitalization:  Weakness [R53.1]  Diarrhea, unspecified type [R19.7]  Admit Date/Time: 6/22/2017 11:03 AM  Discharge Date: 6/23/17  /Payor Source: Payor: MEDICARE / Plan: MEDICARE / Product Type: Medicare /     Readmission Assessment Measure (EMI) Risk Score/category:Elevated         Reason for Communication Hand-off Referral: Fragility  And readmission.  Discharge Plan: TCU       Concern for non-adherence with plan of care:   NO  Discharge Needs Assessment:  Needs       Most Recent Value    Equipment Currently Used at Home grab bar, walker, rolling    Transportation Available car, family or friend will provide, van, wheelchair accessible    # of Referrals Placed by [facility] Linkage Line    Transitional Care [facility] [phone]    Skilled Nursing Facility [facility] [phone], Fax: [phone]    PAS Number [id number]    Senior Linkage Line Referral Placed 06/23/17          Already enrolled in Tele-monitoring program and name of program:  UNKNOWN  Follow-up specialty is recommended: Yes -cont to f/u with ortho    Follow-up plan:  No future appointments.    Any outstanding tests or procedures:        Referrals     Future Labs/Procedures    Occupational Therapy Adult Consult     Comments:    Evaluate and treat as clinically indicated.    Reason:  Diffuse weakness    Physical Therapy Adult Consult     Comments:    Evaluate and treat as clinically indicated.    Reason:  Diffuse weakness, left non displaced hip fracture (non operative)            Key Recommendations:  Patient recently admitted for non operative hip fx.  She comes back in with profound weakness and diarrhea.  CDIFF was negative.   DC'd to TCU for continued therapies.    Bre Bowie, RN,BSN, [facility]  Care Coordination  [phone]      AVS/Discharge Summary is the source of truth; this is a helpful guide for improved communication of patient story

## 2017-06-29 ENCOUNTER — CARE COORDINATION (OUTPATIENT)
Dept: GERIATRIC MEDICINE | Facility: CLINIC | Age: 82
End: 2017-06-29

## 2017-06-29 NOTE — PROGRESS NOTES
Referral from [facility].  Hospital stay 6/19-6/21 Dx, Nondisplaced left greater trochanteric hip fracture.  DC'd to home with home care and instructions for WBAT and to follow up with ortho in 2 weeks. 1 day after DC to home she is readmitted to [facility].  Second hospital stay 6/22-6/24,  dx 1. Diarrhea 2. AYAKA 3. CAD 4. Recent Left Hip Fracture 5. Diffuse Weakness 6. Neuropathy 7. Depression 8. Anemia.  6/24 DCd to [facility].   Received referral 6/29/17.  Call placed to Rehana Kaiser and Erlinda FERNANDES, left voicemail introducing self and requested call back and invitations to care conference.  Call placed to son, Gael, to introduce self.  Gael was thankful for call. Says his mother, prior to these 2 hospitalization, was very sharp and lived independently in her apartment.  He took this CM contact information.  States will call as needed.   Shea Juan RN, BSN, PHN  [facility]  [phone]  Fax: [phone]

## 2017-06-29 NOTE — PROGRESS NOTES
Return call from Rehana FERNANDES   [facility] care conference has not been scheduled but will invite CM.   [facility] got report from PT on Tuesday:  20/30 on SLUMS, some visual impairment, is walking with PT. Estimate length of stay, 2 weeks.   Shea Juan RN, BSN, PHN  [facility]  [phone]  Fax: [phone]

## 2017-06-29 NOTE — PROGRESS NOTES
[facility].  [facility] assigned CC is Shea Juan RN.  Ophelia Gonzales  CMS Supervisor  [facility]  [phone]

## 2017-06-30 ENCOUNTER — HOSPITAL LABORATORY (OUTPATIENT)
Dept: OTHER | Facility: CLINIC | Age: 82
End: 2017-06-30

## 2017-06-30 LAB
ANION GAP SERPL CALCULATED.3IONS-SCNC: 9 MMOL/L (ref 3–14)
BACTERIA SPEC CULT: NORMAL
BUN SERPL-MCNC: 16 MG/DL (ref 7–30)
CALCIUM SERPL-MCNC: 9.1 MG/DL (ref 8.5–10.1)
CHLORIDE SERPL-SCNC: 101 MMOL/L (ref 94–109)
CO2 SERPL-SCNC: 27 MMOL/L (ref 20–32)
CREAT SERPL-MCNC: 0.49 MG/DL (ref 0.52–1.04)
ERYTHROCYTE [DISTWIDTH] IN BLOOD BY AUTOMATED COUNT: 16.4 % (ref 10–15)
GFR SERPL CREATININE-BSD FRML MDRD: ABNORMAL ML/MIN/1.7M2
GLUCOSE SERPL-MCNC: 135 MG/DL (ref 70–99)
HCT VFR BLD AUTO: 29.1 % (ref 35–47)
HGB BLD-MCNC: 9.2 G/DL (ref 11.7–15.7)
Lab: NORMAL
MCH RBC QN AUTO: 29.8 PG (ref 26.5–33)
MCHC RBC AUTO-ENTMCNC: 31.6 G/DL (ref 31.5–36.5)
MCV RBC AUTO: 94 FL (ref 78–100)
MICRO REPORT STATUS: NORMAL
PLATELET # BLD AUTO: 348 10E9/L (ref 150–450)
POTASSIUM SERPL-SCNC: 3.9 MMOL/L (ref 3.4–5.3)
RBC # BLD AUTO: 3.09 10E12/L (ref 3.8–5.2)
SODIUM SERPL-SCNC: 137 MMOL/L (ref 133–144)
SPECIMEN SOURCE: NORMAL
WBC # BLD AUTO: 13.1 10E9/L (ref 4–11)

## 2017-07-03 ENCOUNTER — NURSING HOME VISIT (OUTPATIENT)
Dept: GERIATRICS | Facility: CLINIC | Age: 82
End: 2017-07-03
Payer: MEDICARE

## 2017-07-03 ENCOUNTER — HOSPITAL LABORATORY (OUTPATIENT)
Dept: OTHER | Facility: CLINIC | Age: 82
End: 2017-07-03

## 2017-07-03 VITALS
HEIGHT: 63 IN | RESPIRATION RATE: 16 BRPM | DIASTOLIC BLOOD PRESSURE: 81 MMHG | WEIGHT: 130 LBS | HEART RATE: 80 BPM | OXYGEN SATURATION: 100 % | SYSTOLIC BLOOD PRESSURE: 154 MMHG | BODY MASS INDEX: 23.04 KG/M2 | TEMPERATURE: 97.6 F

## 2017-07-03 DIAGNOSIS — D72.829 LEUKOCYTOSIS, UNSPECIFIED TYPE: Primary | ICD-10-CM

## 2017-07-03 LAB
ALBUMIN UR-MCNC: 10 MG/DL
APPEARANCE UR: CLEAR
BILIRUB UR QL STRIP: NEGATIVE
COLOR UR AUTO: YELLOW
GLUCOSE UR STRIP-MCNC: NEGATIVE MG/DL
HGB UR QL STRIP: NEGATIVE
HYALINE CASTS #/AREA URNS LPF: 1 /LPF (ref 0–2)
KETONES UR STRIP-MCNC: NEGATIVE MG/DL
LEUKOCYTE ESTERASE UR QL STRIP: NEGATIVE
MUCOUS THREADS #/AREA URNS LPF: PRESENT /LPF
NITRATE UR QL: NEGATIVE
PH UR STRIP: 6.5 PH (ref 5–7)
RBC #/AREA URNS AUTO: <1 /HPF (ref 0–2)
SP GR UR STRIP: 1.01 (ref 1–1.03)
URN SPEC COLLECT METH UR: ABNORMAL
UROBILINOGEN UR STRIP-MCNC: 2 MG/DL (ref 0–2)
WBC #/AREA URNS AUTO: 1 /HPF (ref 0–2)

## 2017-07-03 PROCEDURE — 99307 SBSQ NF CARE SF MDM 10: CPT | Performed by: NURSE PRACTITIONER

## 2017-07-03 RX ORDER — NYSTATIN 100000/ML
500000 SUSPENSION, ORAL (FINAL DOSE FORM) ORAL 4 TIMES DAILY
COMMUNITY
End: 2017-07-10

## 2017-07-03 NOTE — PROGRESS NOTES
[facility]    Chief Complaint   Patient presents with     Abnormal Labs     Elevated White Blood Cell count     RECHECK       HPI:    Daya Ignacio is a 86 year old  (10/11/1930), who is being seen today for an episodic care visit at [facility].  HPI information obtained from: facility chart records.Today's concern is:    Elevated white blood cell count     WBC persists and trending up. CC urine sent last week negative. Patient denies fever, chills, abd pain, n/v, diarrhea, dysuria. Denies SOB, cough. Denies noting new pain - rash or open area.  CXR obtained today and with no acute findings.     ALLERGIES: Atorvastatin; Macrobid [nitrofurantoin anhydrous]; Penicillins; and Sulfa drugs  Past Medical, Surgical, Family and Social History reviewed and updated in EPIC.    Current Outpatient Prescriptions   Medication Sig Dispense Refill     HYDROcodone-acetaminophen (NORCO) 5-325 MG per tablet Take 1 tablet by mouth every 4 hours as needed for moderate to severe pain 20 tablet 0     acetaminophen (TYLENOL) 325 MG tablet Take 2 tablets (650 mg) by mouth every 4 hours as needed for mild pain 100 tablet 0     loperamide (IMODIUM) 2 MG capsule Take 1 capsule (2 mg) by mouth 4 times daily as needed for diarrhea 20 capsule      metoprolol (TOPROL-XL) 25 MG 24 hr tablet Take 1 tablet (25 mg) by mouth daily 30 tablet 0     hydrOXYzine (VISTARIL) 25 MG capsule Take 1 capsule (25 mg) by mouth every 4 hours as needed for itching or other (pain adjuvunct) 30 capsule 0     hydrOXYzine (ATARAX) 25 MG tablet Take 1 tablet (25 mg) by mouth every 4 hours as needed (pain) (Patient taking differently: Take 25 mg by mouth every 8 hours as needed (pain) ) 20 tablet 0     estradiol (ESTRACE) 0.1 MG/GM cream Place 2 g vaginally three times a week paraben-free 6 g 1     Cholecalciferol (VITAMIN D3) 2000 UNITS CAPS Take 2,000 Units by mouth daily (with dinner)       Multiple Vitamins-Minerals  "(PRESERVISION AREDS) CAPS Take 1 capsule by mouth 2 times daily       cyanocobalamin (VITAMIN  B-12) 1000 MCG tablet Take 1,000 mcg by mouth every morning       aspirin EC 81 MG EC tablet Take 1 tablet (81 mg) by mouth daily 30 tablet 3     isosorbide dinitrate (ISORDIL) 10 MG tablet Take 1 tablet (10 mg) by mouth 3 times daily 90 tablet 3     amLODIPine (NORVASC) 10 MG tablet Take 1 tablet (10 mg) by mouth daily 30 tablet 3     gabapentin (NEURONTIN) 300 MG capsule TAKE ONE CAPSULE BY MOUTH THREE TIMES A  capsule 1     sertraline (ZOLOFT) 50 MG tablet Take 1 tablet (50 mg) by mouth daily 90 tablet 1     lisinopril (PRINIVIL/ZESTRIL) 40 MG tablet Take 1 tablet (40 mg) by mouth daily 90 tablet 1     cloNIDine (CATAPRES) 0.1 MG tablet Take 1 tablet (0.1 mg) by mouth 2 times daily 180 tablet 2     Medications reviewed:  Medications reconciled to facility chart and changes were made to reflect current medications as identified as above med list. Below are the changes that were made:   Medications stopped since last EPIC medication reconciliation:   There are no discontinued medications.    Medications started since last [facility] medication reconciliation:  No orders of the defined types were placed in this encounter.        REVIEW OF SYSTEMS:  4 point ROS including Respiratory, CV, GI and , other than that noted in the HPI,  is negative    Physical Exam:  /81  Pulse 80  Temp 97.6  F (36.4  C)  Resp 16  Ht 5' 3\" (1.6 m)  Wt 130 lb (59 kg)  SpO2 100%  BMI 23.03 kg/m2    Resp: Effort WNL, LSCTA   CV: VS as above  Abd- soft, nontender, BS +  Musc- KAHN  Skin- no rashes, open areas noted  Psych- alert, calm, pleasant      BP 06/26-07/03: 109-174/57-82mmHg    Recent Labs:    CBC RESULTS:   Recent Labs   Lab Test  06/30/17   0941  06/28/17   0645   WBC  13.1*  11.4*   RBC  3.09*  3.36*   HGB  9.2*  10.0*   HCT  29.1*  30.4*   MCV  94  91   MCH  29.8  29.8   MCHC  31.6  32.9   RDW  16.4*  15.7*   PLT  348  281 "       Last Basic Metabolic Panel:  Recent Labs   Lab Test  06/30/17   0941  06/28/17   0645   NA  137  137   POTASSIUM  3.9  3.1*   CHLORIDE  101  101   DAVE  9.1  8.8   CO2  27  28   BUN  16  13   CR  0.49*  0.45*   GLC  135*  95       Liver Function Studies -   Recent Labs   Lab Test  06/20/17   1259  05/28/17   1115   PROTTOTAL  6.9  7.3   ALBUMIN  3.5  3.6   BILITOTAL  1.5*  1.6*   ALKPHOS  78  69   AST  11  33   ALT  19  18     Lab Results   Component Value Date    A1C 5.3 01/13/2017    A1C 5.2 08/08/2015       Assessment/Plan:  Elevated white blood cell count  - send SC urine sample for UA/UC  - follow VS/clinically closely  - repeat CBC 7/5                Electronically signed by  KARTHIK Gaspar CNP

## 2017-07-05 ENCOUNTER — HOSPITAL LABORATORY (OUTPATIENT)
Dept: OTHER | Facility: CLINIC | Age: 82
End: 2017-07-05

## 2017-07-05 LAB
ERYTHROCYTE [DISTWIDTH] IN BLOOD BY AUTOMATED COUNT: 16.9 % (ref 10–15)
HCT VFR BLD AUTO: 29.4 % (ref 35–47)
HGB BLD-MCNC: 9.6 G/DL (ref 11.7–15.7)
MCH RBC QN AUTO: 30.3 PG (ref 26.5–33)
MCHC RBC AUTO-ENTMCNC: 32.7 G/DL (ref 31.5–36.5)
MCV RBC AUTO: 93 FL (ref 78–100)
PLATELET # BLD AUTO: 344 10E9/L (ref 150–450)
RBC # BLD AUTO: 3.17 10E12/L (ref 3.8–5.2)
WBC # BLD AUTO: 6.8 10E9/L (ref 4–11)

## 2017-07-06 ENCOUNTER — CARE COORDINATION (OUTPATIENT)
Dept: GERIATRIC MEDICINE | Facility: CLINIC | Age: 82
End: 2017-07-06

## 2017-07-06 NOTE — PROGRESS NOTES
Call from Rehana at [facility] they have a care conference scheduled for 7/10 at 3pm.   This CM will be in attendance.  Shea Juan RN, BSN, PHN  [facility]  [phone]  Fax: [phone]

## 2017-07-10 ENCOUNTER — DISCHARGE SUMMARY NURSING HOME (OUTPATIENT)
Dept: GERIATRICS | Facility: CLINIC | Age: 82
End: 2017-07-10
Payer: MEDICARE

## 2017-07-10 VITALS
HEIGHT: 63 IN | SYSTOLIC BLOOD PRESSURE: 136 MMHG | BODY MASS INDEX: 22.22 KG/M2 | TEMPERATURE: 97.5 F | HEART RATE: 88 BPM | WEIGHT: 125.4 LBS | RESPIRATION RATE: 16 BRPM | DIASTOLIC BLOOD PRESSURE: 82 MMHG | OXYGEN SATURATION: 97 %

## 2017-07-10 DIAGNOSIS — I25.10 CORONARY ARTERY DISEASE INVOLVING NATIVE HEART WITHOUT ANGINA PECTORIS, UNSPECIFIED VESSEL OR LESION TYPE: ICD-10-CM

## 2017-07-10 DIAGNOSIS — I50.9 CONGESTIVE HEART FAILURE, UNSPECIFIED CONGESTIVE HEART FAILURE CHRONICITY, UNSPECIFIED CONGESTIVE HEART FAILURE TYPE: ICD-10-CM

## 2017-07-10 DIAGNOSIS — S69.92XD LEFT WRIST INJURY, SUBSEQUENT ENCOUNTER: ICD-10-CM

## 2017-07-10 DIAGNOSIS — R19.7 DIARRHEA, UNSPECIFIED TYPE: ICD-10-CM

## 2017-07-10 DIAGNOSIS — G62.9 NEUROPATHY: ICD-10-CM

## 2017-07-10 DIAGNOSIS — F32.A DEPRESSION, UNSPECIFIED DEPRESSION TYPE: ICD-10-CM

## 2017-07-10 DIAGNOSIS — R60.9 EDEMA, UNSPECIFIED TYPE: ICD-10-CM

## 2017-07-10 DIAGNOSIS — D64.9 ANEMIA, UNSPECIFIED TYPE: ICD-10-CM

## 2017-07-10 DIAGNOSIS — R53.81 PHYSICAL DECONDITIONING: ICD-10-CM

## 2017-07-10 DIAGNOSIS — I10 ESSENTIAL HYPERTENSION: ICD-10-CM

## 2017-07-10 DIAGNOSIS — S72.002D CLOSED LEFT HIP FRACTURE, WITH ROUTINE HEALING, SUBSEQUENT ENCOUNTER: Primary | ICD-10-CM

## 2017-07-10 PROCEDURE — 99207 ZZC CDG-CORRECTLY CODED, REVIEWED AND AGREE: CPT | Performed by: NURSE PRACTITIONER

## 2017-07-10 PROCEDURE — 99316 NF DSCHRG MGMT 30 MIN+: CPT | Performed by: NURSE PRACTITIONER

## 2017-07-11 ENCOUNTER — CARE COORDINATION (OUTPATIENT)
Dept: GERIATRIC MEDICINE | Facility: CLINIC | Age: 82
End: 2017-07-11

## 2017-07-11 NOTE — PROGRESS NOTES
Attended care conference 7/10/17 at [facility].  [facility].    OT reports independence with grooming and dressing.  Call light in the night for extra precautions only.  Will DC to home with [facility] RN, OT, PT and HHA.   BIMS 15/15.  PHQ9 =2.  MLM will call in new medications to pharmacy.    Negative UA on 4/3.     Shea Juan RN, BSN, PHN  [facility]  [phone]  Fax: [phone]

## 2017-07-13 DIAGNOSIS — I10 ESSENTIAL HYPERTENSION: ICD-10-CM

## 2017-07-13 DIAGNOSIS — F41.1 GENERALIZED ANXIETY DISORDER: ICD-10-CM

## 2017-07-13 NOTE — TELEPHONE ENCOUNTER
Pending Prescriptions:                       Disp   Refills    lisinopril (PRINIVIL/ZESTRIL) 40 MG table*90 tab*1            Sig: TAKE ONE TABLET BY MOUTH ONCE DAILY    sertraline (ZOLOFT) 50 MG tablet [Pharmac*90 tab*1            Sig: TAKE ONE TABLET BY MOUTH ONCE DAILY        LISINOPRIL      Last Written Prescription Date: 1/13/2017  Last Fill Quantity: 90, # refills: 1  Last Office Visit with [facility] or [facility] prescribing provider: 6/16/2017Rodger       Potassium   Date Value Ref Range Status   06/30/2017 3.9 3.4 - 5.3 mmol/L Final     Creatinine   Date Value Ref Range Status   06/30/2017 0.49 (L) 0.52 - 1.04 mg/dL Final     BP Readings from Last 3 Encounters:   07/10/17 136/82   07/03/17 154/81   06/28/17 152/82         SERTRALINE     Last Written Prescription Date: 1/13/2017  Last Fill Quantity: 90, # refills: 1  Last Office Visit with [facility] primary care provider:  6/16/2017Rodger          Last PHQ-9 score on record=   PHQ-9 SCORE 1/13/2017   Total Score -   Total Score 2

## 2017-07-14 ENCOUNTER — DOCUMENTATION ONLY (OUTPATIENT)
Dept: CARE COORDINATION | Facility: CLINIC | Age: 82
End: 2017-07-14

## 2017-07-14 ENCOUNTER — TELEPHONE (OUTPATIENT)
Dept: FAMILY MEDICINE | Facility: CLINIC | Age: 82
End: 2017-07-14

## 2017-07-14 RX ORDER — LISINOPRIL 40 MG/1
TABLET ORAL
Qty: 90 TABLET | Refills: 1 | Status: SHIPPED | OUTPATIENT
Start: 2017-07-14 | End: 2018-03-07

## 2017-07-14 NOTE — TELEPHONE ENCOUNTER
Abi GRANADOS with [facility] [phone] requests verbal orders for  Skilled nurse visits:   1 x week x 1 week   3 x week x 1 week   2 x week x 3 weeks  3 PRN     PT and OT: 1 x 1 eval and treat    Home Health Aid:  2 x week x 4 weeks  Informed approved per standing orders.   Home Care staff will fax these orders for MD natarajan.   Juliano Pena RN

## 2017-07-14 NOTE — TELEPHONE ENCOUNTER
Prescription approved per [facility] Refill Protocol.  Teresa Ybarra RN

## 2017-07-14 NOTE — PROGRESS NOTES
[facility] now requests orders and shares plan of care/discharge summaries for some patients through [employer].  Please REPLY TO THIS MESSAGE in order to give authorization for orders when needed.  This is considered a verbal order, you will still receive a faxed copy of orders for signature.  Thank you for your assistance in improving collaboration for our patients.    ORDER  PT 2w2, 1w2 for gait, strength, balance, transfers, education, HEP.    MD SUMMARY/PLAN OF CARE  patient presents with impaired gait, decreased balance/stability and decreased strength following hospitalization and left hip fracture.  she would benefit from PT visits to address these limitations to improve overall mobility, safety and independence

## 2017-07-17 ENCOUNTER — CARE COORDINATION (OUTPATIENT)
Dept: GERIATRIC MEDICINE | Facility: CLINIC | Age: 82
End: 2017-07-17

## 2017-07-17 NOTE — PROGRESS NOTES
Family and client aware that this will be last contact from this CM.  Should follow up with clinic within 7 days as needed.   Shea Juan RN, BSN, PHN  [facility]  [phone]  Fax: [phone]

## 2017-07-17 NOTE — PROGRESS NOTES
Call placed to Daya to follow up on DC to home, 7/13/17.  States she is doing well.  RN, PT and HHA have come by already.  Has all of her medications and is aware of how to take them.  Encouraged her to schedule a follow up appointment with her PCP.   Call placed to son, Gael, to follow up on DC to home.  Agreed that he thinks things are going well. Is pleased with her home care services.  States her energy is good.  CM encouraged a follow up with PCP.  Shea Juan RN, BSN, PHN  [facility]  [phone]  Fax: [phone]

## 2017-08-11 ENCOUNTER — OFFICE VISIT (OUTPATIENT)
Dept: FAMILY MEDICINE | Facility: CLINIC | Age: 82
End: 2017-08-11
Payer: MEDICARE

## 2017-08-11 VITALS
BODY MASS INDEX: 22.5 KG/M2 | SYSTOLIC BLOOD PRESSURE: 116 MMHG | DIASTOLIC BLOOD PRESSURE: 66 MMHG | HEIGHT: 63 IN | WEIGHT: 127 LBS | TEMPERATURE: 98.3 F | OXYGEN SATURATION: 97 % | HEART RATE: 50 BPM

## 2017-08-11 DIAGNOSIS — S72.102D CLOSED FRACTURE OF TROCHANTER OF LEFT FEMUR WITH ROUTINE HEALING, SUBSEQUENT ENCOUNTER: ICD-10-CM

## 2017-08-11 DIAGNOSIS — R82.90 NONSPECIFIC FINDING ON EXAMINATION OF URINE: ICD-10-CM

## 2017-08-11 DIAGNOSIS — Z01.818 PREOP GENERAL PHYSICAL EXAM: Primary | ICD-10-CM

## 2017-08-11 DIAGNOSIS — R30.0 DYSURIA: ICD-10-CM

## 2017-08-11 LAB
ALBUMIN UR-MCNC: NEGATIVE MG/DL
APPEARANCE UR: CLEAR
BACTERIA #/AREA URNS HPF: ABNORMAL /HPF
BILIRUB UR QL STRIP: NEGATIVE
COLOR UR AUTO: YELLOW
GLUCOSE UR STRIP-MCNC: NEGATIVE MG/DL
HGB UR QL STRIP: ABNORMAL
KETONES UR STRIP-MCNC: NEGATIVE MG/DL
LEUKOCYTE ESTERASE UR QL STRIP: ABNORMAL
NITRATE UR QL: NEGATIVE
NON-SQ EPI CELLS #/AREA URNS LPF: ABNORMAL /LPF
PH UR STRIP: 6 PH (ref 5–7)
RBC #/AREA URNS AUTO: ABNORMAL /HPF (ref 0–2)
SP GR UR STRIP: 1.01 (ref 1–1.03)
URN SPEC COLLECT METH UR: ABNORMAL
UROBILINOGEN UR STRIP-ACNC: 0.2 EU/DL (ref 0.2–1)
WBC #/AREA URNS AUTO: ABNORMAL /HPF (ref 0–2)

## 2017-08-11 PROCEDURE — 81001 URINALYSIS AUTO W/SCOPE: CPT | Performed by: PHYSICIAN ASSISTANT

## 2017-08-11 PROCEDURE — 87088 URINE BACTERIA CULTURE: CPT | Performed by: PHYSICIAN ASSISTANT

## 2017-08-11 PROCEDURE — 87186 SC STD MICRODIL/AGAR DIL: CPT | Performed by: PHYSICIAN ASSISTANT

## 2017-08-11 PROCEDURE — 87086 URINE CULTURE/COLONY COUNT: CPT | Performed by: PHYSICIAN ASSISTANT

## 2017-08-11 PROCEDURE — 99214 OFFICE O/P EST MOD 30 MIN: CPT | Performed by: PHYSICIAN ASSISTANT

## 2017-08-11 RX ORDER — CEPHALEXIN 500 MG/1
500 CAPSULE ORAL 3 TIMES DAILY
Qty: 21 CAPSULE | Refills: 0 | Status: SHIPPED | OUTPATIENT
Start: 2017-08-11 | End: 2017-08-18

## 2017-08-11 RX ORDER — HYDROCODONE BITARTRATE AND ACETAMINOPHEN 5; 325 MG/1; MG/1
1 TABLET ORAL EVERY 4 HOURS PRN
Qty: 90 TABLET | Refills: 0 | Status: SHIPPED | OUTPATIENT
Start: 2017-08-11 | End: 2017-09-05

## 2017-08-11 NOTE — NURSING NOTE
"Chief Complaint   Patient presents with     Pre-Op Exam       Initial /66 (BP Location: Right arm, Patient Position: Chair, Cuff Size: Adult Regular)  Pulse 50  Temp 98.3  F (36.8  C) (Oral)  Ht 5' 3\" (1.6 m)  Wt 127 lb (57.6 kg)  SpO2 97%  Breastfeeding? No  BMI 22.5 kg/m2 Estimated body mass index is 22.5 kg/(m^2) as calculated from the following:    Height as of this encounter: 5' 3\" (1.6 m).    Weight as of this encounter: 127 lb (57.6 kg).  Medication Reconciliation: complete Phyllis Barker MA  Health Maintenance has been reviewed.       "

## 2017-08-11 NOTE — PROGRESS NOTES
[facility]  [address] [address] [address]  [phone]  Dept: [phone]    PRE-OP EVALUATION:  Today's date: 2017    Daya Ignacio (: 10/11/1930) presents for pre-operative evaluation assessment as requested by Dr. Henriquez.  She requires evaluation and anesthesia risk assessment prior to undergoing surgery/procedure for treatment of Right Eye.  Proposed procedure: Vitrectomy of Right Eye    Date of Surgery/ Procedure: 2017  Time of Surgery/ Procedure: Arrival at 1pm Surgery at 2:30  Hospital/Surgical Facility: [facility]  Fax number for surgical facility: [phone]  Primary Physician: Marcy Soares  Type of Anesthesia Anticipated: Block    Patient has a Health Care Directive or Living Will:  YES     1. YES - Do you have a history of heart attack, stroke, stent, bypass or surgery on an artery in the head, neck, heart or legs?   NSTEMI  2. NO - Do you ever have any pain or discomfort in your chest?  3. NO - Do you have a history of  Heart Failure?  4. NO - Are you troubled by shortness of breath when: walking on the level, up a slight hill or at night?  5. NO - Do you currently have a cold, bronchitis or other respiratory infection?  6. NO - Do you have a cough, shortness of breath or wheezing?  7. NO- DO YOU SOMETIMES GET PAINS IN THE CALVES OF YOUR LEGS WHEN YOU WALK?   8. NO - Do you or anyone in your family have previous history of blood clots?  9. NO - Do you or does anyone in your family have a serious bleeding problem such as prolonged bleeding following surgeries or cuts?  10. NO - Have you ever had problems with anemia or been told to take iron pills?  11. NO - Have you had any abnormal blood loss such as black, tarry or bloody stools, or abnormal vaginal bleeding?  12. YES - HAVE YOU EVER HAD A BLOOD TRANSFUSION? 6 years ago  13. NO - Have you or any of your relatives ever had problems with anesthesia?  14. NO  - Do you have sleep apnea, excessive snoring or daytime drowsiness?  15. NO - Do you have any prosthetic heart valves?  16. YES - DO YOU HAVE PROSTHETIC JOINTS? Hip  17. NO - Is there any chance that you may be pregnant?        HPI:                                                      Brief HPI related to upcoming procedure: vitrectomy      See problem list for active medical problems.  Problems all longstanding and stable, except as noted/documented.  See ROS for pertinent symptoms related to these conditions.                                                                                                  .    MEDICAL HISTORY:                                                    Patient Active Problem List    Diagnosis Date Noted     Diarrhea 06/22/2017     Priority: Medium     Trochanteric fracture of left femur (H) 06/20/2017     Priority: Medium     Hip pain 06/19/2017     Priority: Medium     Urinary tract infection without hematuria 06/01/2017     Priority: Medium     NSTEMI (non-ST elevated myocardial infarction) (H) 05/28/2017     Priority: Medium     Chronic pain 03/10/2017     Priority: Medium     Patient is followed by Marcy Soares PA-C, FARZANA for ongoing prescription of pain medication.  All refills should only be approved by this provider, or covering partner.    Medication(s): acetaminophen-codeine (TYLENOL/CODEINE #3) 300-30 MG per tablet   Maximum quantity per month: 270  Clinic visit frequency required: Q 6  months     Controlled substance agreement:  Encounter-Level CSA:     There are no encounter-level csa.        Pain Clinic evaluation in the past: No    DIRE Total Score(s):  No flowsheet data found.    Last [facility] website verification:  done on 3/10/2017   [email or web address]         Hypertension goal BP (blood pressure) < 140/90 09/25/2015     Priority: Medium     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 04/16/2014     Priority: Medium     HTN (hypertension) 09/24/2012      Priority: Medium     Vitamin D deficiency 09/04/2012     Priority: Medium     Advanced directives, counseling/discussion 06/17/2011     Priority: Medium     Advance Directive Problem List Overview:   Name Relationship Phone    Primary Health Care Agent            Alternative Health Care Agent          Patient states has Advance Directive and will bring in a copy to clinic. 6/17/2011        Hip fracture (H) 02/28/2011     Priority: Medium     HYPERLIPIDEMIA LDL GOAL <130 10/31/2010     Priority: Medium     Osteoporosis 09/10/2010     Priority: Medium     Sciatica 09/10/2010     Priority: Medium     Generalized anxiety disorder 07/09/2007     Priority: Medium     describes chronic low lever anxiety/depression        Past Medical History:   Diagnosis Date     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 4/16/2014     Closed fracture of unspecified part of neck of femur      Closed fracture of unspecified part of vertebral column without mention of spinal cord injury     from fall summer 06     Elevated blood pressure reading without diagnosis of hypertension      Hypertension      Macular degeneration (senile) of retina, unspecified      Past Surgical History:   Procedure Laterality Date     C NONSPECIFIC PROCEDURE      lt hip pain     ORTHOPEDIC SURGERY       Current Outpatient Prescriptions   Medication Sig Dispense Refill     lisinopril (PRINIVIL/ZESTRIL) 40 MG tablet TAKE ONE TABLET BY MOUTH ONCE DAILY 90 tablet 1     sertraline (ZOLOFT) 50 MG tablet TAKE ONE TABLET BY MOUTH ONCE DAILY 90 tablet 1     AMLODIPINE BESYLATE PO Take 10 mg by mouth daily       HYDROcodone-acetaminophen (NORCO) 5-325 MG per tablet Take 1 tablet by mouth every 4 hours as needed for moderate to severe pain 20 tablet 0     acetaminophen (TYLENOL) 325 MG tablet Take 2 tablets (650 mg) by mouth every 4 hours as needed for mild pain 100 tablet 0     loperamide (IMODIUM) 2 MG capsule Take 1 capsule (2 mg) by mouth 4 times daily as needed for  "diarrhea 20 capsule      metoprolol (TOPROL-XL) 25 MG 24 hr tablet Take 1 tablet (25 mg) by mouth daily 30 tablet 0     estradiol (ESTRACE) 0.1 MG/GM cream Place 2 g vaginally three times a week paraben-free 6 g 1     Cholecalciferol (VITAMIN D3) 2000 UNITS CAPS Take 2,000 Units by mouth daily (with dinner)       Multiple Vitamins-Minerals (PRESERVISION AREDS) CAPS Take 1 capsule by mouth 2 times daily       cyanocobalamin (VITAMIN  B-12) 1000 MCG tablet Take 1,000 mcg by mouth every morning       aspirin EC 81 MG EC tablet Take 1 tablet (81 mg) by mouth daily 30 tablet 3     isosorbide dinitrate (ISORDIL) 10 MG tablet Take 1 tablet (10 mg) by mouth 3 times daily 90 tablet 3     gabapentin (NEURONTIN) 300 MG capsule TAKE ONE CAPSULE BY MOUTH THREE TIMES A  capsule 1     cloNIDine (CATAPRES) 0.1 MG tablet Take 1 tablet (0.1 mg) by mouth 2 times daily 180 tablet 2     OTC products: None, except as noted above    Allergies   Allergen Reactions     Atorvastatin Muscle Pain (Myalgia)     Macrobid [Nitrofurantoin Anhydrous] Other (See Comments)     Body aches     Penicillins      Sulfa Drugs       Latex Allergy: NO    Social History   Substance Use Topics     Smoking status: Never Smoker     Smokeless tobacco: Never Used     Alcohol use No     History   Drug Use No       REVIEW OF SYSTEMS:                                                    C: NEGATIVE for fever, chills, change in weight  INTEGUMENTARY/SKIN: NEGATIVE for worrisome rashes, moles or lesions  E/M: NEGATIVE for ear, mouth and throat problems  R: NEGATIVE for significant cough or SOB  CV: NEGATIVE for chest pain, palpitations or peripheral edema  MUSCULOSKELETAL: NEGATIVE for significant arthralgias or myalgia    EXAM:                                                    /66 (BP Location: Right arm, Patient Position: Chair, Cuff Size: Adult Regular)  Pulse 50  Temp 98.3  F (36.8  C) (Oral)  Ht 5' 3\" (1.6 m)  Wt 127 lb (57.6 kg)  SpO2 97%  " Breastfeeding? No  BMI 22.5 kg/m2    GENERAL APPEARANCE: healthy, alert and no distress     HENT: ear canals and TM's normal and nose and mouth without ulcers or lesions     NECK: no adenopathy, no asymmetry, masses, or scars and thyroid normal to palpation     RESP: lungs clear to auscultation - no rales, rhonchi or wheezes     CV: regular rates and rhythm, normal S1 S2, no S3 or S4 and no murmur, click or rub     ABDOMEN:  soft, nontender, no HSM or masses and bowel sounds normal     MS: extremities normal- no gross deformities noted, no evidence of inflammation in joints, FROM in all extremities.     SKIN: no suspicious lesions or rashes     NEURO: Normal strength and tone, sensory exam grossly normal, mentation intact and speech normal     PSYCH: mentation appears normal. and affect normal/bright     LYMPHATICS: No axillary, cervical, or supraclavicular nodes    DIAGNOSTICS:                                                    No labs or EKG required for low risk surgery (cataract, skin procedure, breast biopsy, etc)    Recent Labs   Lab Test  07/05/17   0615  06/30/17   0941  06/28/17   0645   06/20/17   1259   01/13/17   1020   08/08/15   0920   06/27/11   0625   HGB  9.6*  9.2*  10.0*   < >  10.7*   < >   --    < >   --    < >  9.4*   PLT  344  348  281   < >  142*   < >   --    < >   --    < >  102*   INR   --    --    --    --   1.18*   --    --    --    --    --   1.21*   NA   --   137  137   < >  137   < >  137   < >   --    < >   --    POTASSIUM   --   3.9  3.1*   < >  4.0   < >  3.4   < >   --    < >  3.5   CR   --   0.49*  0.45*   < >  0.49*   < >  0.71   < >   --    < >   --    A1C   --    --    --    --    --    --   5.3   --   5.2   --    --     < > = values in this interval not displayed.        IMPRESSION:                                                    Reason for surgery/procedure: Right Vitrectomy  Diagnosis/reason for consult: preop    The proposed surgical procedure is considered LOW  risk.    REVISED CARDIAC RISK INDEX  The patient has the following serious cardiovascular risks for perioperative complications such as (MI, PE, VFib and 3  AV Block):  Coronary Artery Disease (MI, positive stress test, angina, Qs on EKG)  INTERPRETATION: 1 risks: Class II (low risk - 0.9% complication rate)    The patient has the following additional risks for perioperative complications:  No identified additional risks      ICD-10-CM    1. Preop general physical exam Z01.818        RECOMMENDATIONS:                                                          --Patient is to take all scheduled medications on the day of surgery    APPROVAL GIVEN to proceed with proposed procedure, without further diagnostic evaluation       Signed Electronically by: Marcy Soares PA-C, FARZANA    Copy of this evaluation report is provided to requesting physician.    [facility] Preop Guidelines

## 2017-08-11 NOTE — PROGRESS NOTES
SUBJECTIVE:                                                    Daya Ignacio is a 86 year old female who presents to clinic today for the following health issues:      URINARY TRACT SYMPTOMS  Onset:     Description:   Painful urination (Dysuria): slight  Blood in urine (Hematuria): no   Delay in urine (Hesitency): no    Intensity: mild    Progression of Symptoms:  waxing and waning    Accompanying Signs & Symptoms:  Fever/chills: no   Flank pain no   Nausea and vomiting: no   Any vaginal symptoms: none  Abdominal/Pelvic Pain: no     History:   History of frequent UTI's: YES  History of kidney stones: no   Sexually Active: no   Possibility of pregnancy: No    Precipitating factors:       Therapies Tried and outcome: Cranberry juice prn (contraindicated in Coumadin patients)      Refill on pain medications.     Problem list and histories reviewed & adjusted, as indicated.  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     Hypertension goal BP (blood pressure) < 140/90     Chronic pain     NSTEMI (non-ST elevated myocardial infarction) (H)     Urinary tract infection without hematuria     Hip pain     Trochanteric fracture of left femur (H)     Diarrhea     Past Surgical History:   Procedure Laterality Date     C NONSPECIFIC PROCEDURE      lt hip pain     ORTHOPEDIC SURGERY         Social History   Substance Use Topics     Smoking status: Never Smoker     Smokeless tobacco: Never Used     Alcohol use No     Family History   Problem Relation Age of Onset     Alzheimer Disease Mother      mild     Cardiovascular Father      heart attack     Neurologic Disorder Brother 83     Parkinson's             Reviewed and updated as needed this  "visit by clinical staffTobacco  Allergies  Med Hx  Surg Hx  Fam Hx  Soc Hx      Reviewed and updated as needed this visit by Provider         ROS:  Constitutional, HEENT, cardiovascular, pulmonary, gi and gu systems are negative, except as otherwise noted.      OBJECTIVE:   /66 (BP Location: Right arm, Patient Position: Chair, Cuff Size: Adult Regular)  Pulse 50  Temp 98.3  F (36.8  C) (Oral)  Ht 5' 3\" (1.6 m)  Wt 127 lb (57.6 kg)  SpO2 97%  Breastfeeding? No  BMI 22.5 kg/m2  Body mass index is 22.5 kg/(m^2).  GENERAL APPEARANCE: healthy, alert and no distress  RESP: lungs clear to auscultation - no rales, rhonchi or wheezes  CV: regular rates and rhythm, normal S1 S2, no S3 or S4 and no murmur, click or rub  ABDOMEN: soft, nontender, without hepatosplenomegaly or masses and bowel sounds normal    Diagnostic Test Results:  Results for orders placed or performed in visit on 08/11/17 (from the past 24 hour(s))   *UA reflex to Microscopic and Culture ([place] and [facility] (except [place] and [place])   Result Value Ref Range    Color Urine Yellow     Appearance Urine Clear     Glucose Urine Negative NEG mg/dL    Bilirubin Urine Negative NEG    Ketones Urine Negative NEG mg/dL    Specific Gravity Urine 1.010 1.003 - 1.035    Blood Urine Trace (A) NEG    pH Urine 6.0 5.0 - 7.0 pH    Protein Albumin Urine Negative NEG mg/dL    Urobilinogen Urine 0.2 0.2 - 1.0 EU/dL    Nitrite Urine Negative NEG    Leukocyte Esterase Urine Moderate (A) NEG    Source Midstream Urine    Urine Microscopic   Result Value Ref Range    WBC Urine 10-25 (A) 0 - 2 /HPF    RBC Urine O - 2 0 - 2 /HPF    Squamous Epithelial /LPF Urine Few FEW /LPF    Bacteria Urine Few (A) NEG /HPF       ASSESSMENT/PLAN:             1. Preop general physical exam  Form faxed    2. Closed fracture of trochanter of left femur with routine healing, subsequent encounter  Refilled   - HYDROcodone-acetaminophen (NORCO) 5-325 MG per tablet; Take 1 " tablet by mouth every 4 hours as needed for moderate to severe pain maximum 6 tablet(s) per day  Dispense: 90 tablet; Refill: 0    3. Dysuria  F/u as needed   - *UA reflex to Microscopic and Culture ([place] and [facility] (except [place] and [place])  - Urine Culture Aerobic Bacterial  - Urine Microscopic  - cephALEXin (KEFLEX) 500 MG capsule; Take 1 capsule (500 mg) by mouth 3 times daily for 7 days  Dispense: 21 capsule; Refill: 0    4. Nonspecific finding on examination of urine    - Urine Culture Aerobic Bacterial        Marcy Soares PA-C, PA-C  [facility]

## 2017-08-11 NOTE — MR AVS SNAPSHOT
After Visit Summary   8/11/2017    Daya Ignacio    MRN: [id number]           Patient Information     Date Of Birth          10/11/1930        Visit Information        Provider Department      8/11/2017 9:30 AM Marcy Soares PA-C [facility]        Today's Diagnoses     Preop general physical exam    -  1    Closed fracture of trochanter of left femur with routine healing, subsequent encounter        Dysuria        Nonspecific finding on examination of urine          Care Instructions      Before Your Surgery      Call your surgeon if there is any change in your health. This includes signs of a cold or flu (such as a sore throat, runny nose, cough, rash or fever).    Do not smoke, drink alcohol or take over the counter medicine (unless your surgeon or primary care doctor tells you to) for the 24 hours before and after surgery.    If you take prescribed drugs: Follow your doctor s orders about which medicines to take and which to stop until after surgery.    Eating and drinking prior to surgery: follow the instructions from your surgeon    Take a shower or bath the night before surgery. Use the soap your surgeon gave you to gently clean your skin. If you do not have soap from your surgeon, use your regular soap. Do not shave or scrub the surgery site.  Wear clean pajamas and have clean sheets on your bed.           Follow-ups after your visit        Who to contact     If you have questions or need follow up information about today's clinic visit or your schedule please contact [facility] directly at [phone].  Normal or non-critical lab and imaging results will be communicated to you by MyChart, letter or phone within 4 business days after the clinic has received the results. If you do not hear from us within 7 days, please contact the clinic through MyChart or phone. If you have a critical or abnormal lab result, we will notify you by phone as soon  "as possible.  Submit refill requests through [employer] or call your pharmacy and they will forward the refill request to us. Please allow 3 business days for your refill to be completed.          Additional Information About Your Visit        [email or web address] Information     [employer] lets you send messages to your doctor, view your test results, renew your prescriptions, schedule appointments and more. To sign up, go to [email or web address] . Click on \"Log in\" on the left side of the screen, which will take you to the Welcome page. Then click on \"Sign up Now\" on the right side of the page.     You will be asked to enter the access code listed below, as well as some personal information. Please follow the directions to create your username and password.     Your access code is: [email or web address]  Expires: 2017 10:49 AM     Your access code will  in 90 days. If you need help or a new code, please call your [place] clinic or [phone].        Care EveryWhere ID     This is your Care EveryWhere ID. This could be used by other organizations to access your [place] medical records  [id number]        Your Vitals Were     Pulse Temperature Height Pulse Oximetry Breastfeeding? BMI (Body Mass Index)    50 98.3  F (36.8  C) (Oral) 5' 3\" (1.6 m) 97% No 22.5 kg/m2       Blood Pressure from Last 3 Encounters:   17 116/66   07/10/17 136/82   17 154/81    Weight from Last 3 Encounters:   17 127 lb (57.6 kg)   07/10/17 125 lb 6.4 oz (56.9 kg)   17 130 lb (59 kg)              We Performed the Following     *UA reflex to Microscopic and Culture ([place] and [facility] (except [place] and [place])     Urine Culture Aerobic Bacterial     Urine Microscopic          Today's Medication Changes          These changes are accurate as of: 17 11:07 AM.  If you have any questions, ask your nurse or doctor.               Start taking these medicines.        Dose/Directions    cephALEXin 500 MG capsule "   Commonly known as:  KEFLEX   Used for:  Dysuria   Started by:  Marcy Soares PA-C        Dose:  500 mg   Take 1 capsule (500 mg) by mouth 3 times daily for 7 days   Quantity:  21 capsule   Refills:  0         These medicines have changed or have updated prescriptions.        Dose/Directions    * HYDROcodone-acetaminophen 5-325 MG per tablet   Commonly known as:  NORCO   This may have changed:  Another medication with the same name was added. Make sure you understand how and when to take each.   Used for:  Hip pain, left        Dose:  1 tablet   Take 1 tablet by mouth every 4 hours as needed for moderate to severe pain   Quantity:  20 tablet   Refills:  0       * HYDROcodone-acetaminophen 5-325 MG per tablet   Commonly known as:  NORCO   This may have changed:  You were already taking a medication with the same name, and this prescription was added. Make sure you understand how and when to take each.   Used for:  Closed fracture of trochanter of left femur with routine healing, subsequent encounter   Changed by:  Marcy Soares PA-C        Dose:  1 tablet   Take 1 tablet by mouth every 4 hours as needed for moderate to severe pain maximum 6 tablet(s) per day   Quantity:  90 tablet   Refills:  0       * Notice:  This list has 2 medication(s) that are the same as other medications prescribed for you. Read the directions carefully, and ask your doctor or other care provider to review them with you.         Where to get your medicines      These medications [facility] [place] [facility] [address] [address] [address]     Phone:  [phone]     cephALEXin 500 MG capsule         Some of these will need a paper prescription and others can be bought over the counter.  Ask your nurse if you have questions.     Bring a paper prescription for each of these medications     HYDROcodone-acetaminophen 5-325 MG per tablet                Primary Care  Provider Office Phone # Fax #    Marcy Natalia Soares PA-C [phone] [address] [facility] [address]        Equal Access to Services     MARIZOL AMOR : Anikte piper villatoro alexandre Kellerali, waaxda luqadaha, qaybta kaalmada adeeliceo, kinga edwards laAlisjenny lambert. So [facility] [phone].    ATENCIÓN: Si habla español, tiene a cohen disposición servicios gratuitos de asistencia lingüística. Llame al [phone].    We comply with applicable federal civil rights laws and [place] laws. We do not discriminate on the basis of race, color, national origin, age, disability sex, sexual orientation or gender identity.            Thank you!     Thank you for choosing [facility]  for your care. Our goal is always to provide you with excellent care. Hearing back from our patients is one way we can continue to improve our services. Please take a few minutes to complete the written survey that you may receive in the mail after your visit with us. Thank you!             Your Updated Medication List - Protect others around you: Learn how to safely use, store and throw away your medicines at www.disposemymeds.org.          This list is accurate as of: 8/11/17 11:07 AM.  Always use your most recent med list.                   Brand Name Dispense Instructions for use Diagnosis    acetaminophen 325 MG tablet    TYLENOL    100 tablet    Take 2 tablets (650 mg) by mouth every 4 hours as needed for mild pain    Closed fracture of trochanter of left femur, initial encounter (H)       AMLODIPINE BESYLATE PO      Take 10 mg by mouth daily        aspirin 81 MG EC tablet     30 tablet    Take 1 tablet (81 mg) by mouth daily    Non-STEMI (non-ST elevated myocardial infarction) (H)       cephALEXin 500 MG capsule    KEFLEX    21 capsule    Take 1 capsule (500 mg) by mouth 3 times daily for 7 days    Dysuria       cloNIDine 0.1 MG tablet    CATAPRES    180 tablet    Take 1 tablet (0.1 mg) by  mouth 2 times daily    Essential hypertension       cyanocobalamin 1000 MCG tablet    vitamin  B-12     Take 1,000 mcg by mouth every morning        estradiol 0.1 MG/GM cream    ESTRACE    6 g    Place 2 g vaginally three times a week paraben-free    Symptomatic menopausal or female climacteric states       gabapentin 300 MG capsule    NEURONTIN    180 capsule    TAKE ONE CAPSULE BY MOUTH THREE TIMES A DAY    Neuropathy (H)       * HYDROcodone-acetaminophen 5-325 MG per tablet    NORCO    20 tablet    Take 1 tablet by mouth every 4 hours as needed for moderate to severe pain    Hip pain, left       * HYDROcodone-acetaminophen 5-325 MG per tablet    NORCO    90 tablet    Take 1 tablet by mouth every 4 hours as needed for moderate to severe pain maximum 6 tablet(s) per day    Closed fracture of trochanter of left femur with routine healing, subsequent encounter       isosorbide dinitrate 10 MG tablet    ISORDIL    90 tablet    Take 1 tablet (10 mg) by mouth 3 times daily    Non-STEMI (non-ST elevated myocardial infarction) (H)       lisinopril 40 MG tablet    PRINIVIL/ZESTRIL    90 tablet    TAKE ONE TABLET BY MOUTH ONCE DAILY    Essential hypertension       loperamide 2 MG capsule    IMODIUM    20 capsule    Take 1 capsule (2 mg) by mouth 4 times daily as needed for diarrhea    Diarrhea, unspecified type       metoprolol 25 MG 24 hr tablet    TOPROL-XL    30 tablet    Take 1 tablet (25 mg) by mouth daily    HTN (hypertension), benign       PRESERVISION AREDS Caps      Take 1 capsule by mouth 2 times daily        sertraline 50 MG tablet    ZOLOFT    90 tablet    TAKE ONE TABLET BY MOUTH ONCE DAILY    Generalized anxiety disorder       vitamin D3 2000 UNITS Caps      Take 2,000 Units by mouth daily (with dinner)        * Notice:  This list has 2 medication(s) that are the same as other medications prescribed for you. Read the directions carefully, and ask your doctor or other care provider to review them with you.

## 2017-08-15 LAB
BACTERIA SPEC CULT: ABNORMAL
MICRO REPORT STATUS: ABNORMAL
MICROORGANISM SPEC CULT: ABNORMAL
SPECIMEN SOURCE: ABNORMAL

## 2017-09-05 ENCOUNTER — TELEPHONE (OUTPATIENT)
Dept: FAMILY MEDICINE | Facility: CLINIC | Age: 82
End: 2017-09-05

## 2017-09-05 DIAGNOSIS — S72.102D CLOSED FRACTURE OF TROCHANTER OF LEFT FEMUR WITH ROUTINE HEALING, SUBSEQUENT ENCOUNTER: ICD-10-CM

## 2017-09-05 DIAGNOSIS — M25.552 HIP PAIN, LEFT: ICD-10-CM

## 2017-09-05 RX ORDER — HYDROCODONE BITARTRATE AND ACETAMINOPHEN 5; 325 MG/1; MG/1
1 TABLET ORAL EVERY 4 HOURS PRN
Qty: 90 TABLET | Refills: 0 | Status: SHIPPED | OUTPATIENT
Start: 2017-09-05 | End: 2017-09-06

## 2017-09-05 NOTE — TELEPHONE ENCOUNTER
Controlled Substance Refill Request for [employer] 5/325  Problem List Complete:  No     PROVIDER TO CONSIDER COMPLETION OF PROBLEM LIST AND OVERVIEW/CONTROLLED SUBSTANCE AGREEMENT    Last Written Prescription Date:  8/11/2017  Last Fill Quantity: 90,   # refills: 0    Last Office Visit with [facility] primary care provider: 8/11/2017    Future Office visit:   Next 5 appointments (look out 90 days)     Oct 06, 2017  3:15 PM CDT   Return Visit with Deacon Fleming MD   [facility] [place] ([facility])    [address] [address] [address] [phone]                  Controlled substance agreement on file: Yes:  Date 02/28/2011.  Processing:  Staff will hand deliver Rx to on-site pharmacy     checked in past 6 months?  No, route to RN

## 2017-09-05 NOTE — TELEPHONE ENCOUNTER
Received 5 page fax for Home Health Certification and Plan of Care for Dr Omer to complete.  Form in the in-basket at [facility] in AA's folder.

## 2017-09-05 NOTE — TELEPHONE ENCOUNTER
Controlled Substance Refill Request for [place]-pt would like today please  Problem List Complete:  No     PROVIDER TO CONSIDER COMPLETION OF PROBLEM LIST AND OVERVIEW/CONTROLLED SUBSTANCE AGREEMENT    Last Written Prescription Date:  08/11/17  Last Fill Quantity: 90,   # refills: 0    Last Office Visit with [facility] primary care provider: 08/11/17    Future Office visit:   Next 5 appointments (look out 90 days)     Oct 06, 2017  3:15 PM CDT   Return Visit with Deacon Fleming MD   [facility] [place] ([facility])    [address] [address] [id number] [phone]                  Controlled substance agreement on file: No.     Processing:  Staff will hand deliver Rx to on-site pharmacy     checked in past 6 months?  No, route to RN

## 2017-09-06 ENCOUNTER — OFFICE VISIT (OUTPATIENT)
Dept: FAMILY MEDICINE | Facility: CLINIC | Age: 82
End: 2017-09-06
Payer: MEDICARE

## 2017-09-06 VITALS
BODY MASS INDEX: 22.14 KG/M2 | SYSTOLIC BLOOD PRESSURE: 154 MMHG | TEMPERATURE: 97.5 F | DIASTOLIC BLOOD PRESSURE: 62 MMHG | WEIGHT: 125 LBS | HEART RATE: 55 BPM | RESPIRATION RATE: 14 BRPM

## 2017-09-06 DIAGNOSIS — N39.0 RECURRENT UTI: ICD-10-CM

## 2017-09-06 DIAGNOSIS — R30.0 DYSURIA: Primary | ICD-10-CM

## 2017-09-06 LAB
ALBUMIN UR-MCNC: 100 MG/DL
APPEARANCE UR: ABNORMAL
BACTERIA #/AREA URNS HPF: ABNORMAL /HPF
BILIRUB UR QL STRIP: NEGATIVE
COLOR UR AUTO: YELLOW
GLUCOSE UR STRIP-MCNC: NEGATIVE MG/DL
HGB UR QL STRIP: ABNORMAL
KETONES UR STRIP-MCNC: NEGATIVE MG/DL
LEUKOCYTE ESTERASE UR QL STRIP: ABNORMAL
NITRATE UR QL: NEGATIVE
NON-SQ EPI CELLS #/AREA URNS LPF: ABNORMAL /LPF
PH UR STRIP: 6 PH (ref 5–7)
RBC #/AREA URNS AUTO: ABNORMAL /HPF
SOURCE: ABNORMAL
SP GR UR STRIP: 1.01 (ref 1–1.03)
UROBILINOGEN UR STRIP-ACNC: 0.2 EU/DL (ref 0.2–1)
WBC #/AREA URNS AUTO: >100 /HPF

## 2017-09-06 PROCEDURE — 87086 URINE CULTURE/COLONY COUNT: CPT | Performed by: PHYSICIAN ASSISTANT

## 2017-09-06 PROCEDURE — 81001 URINALYSIS AUTO W/SCOPE: CPT | Performed by: PHYSICIAN ASSISTANT

## 2017-09-06 PROCEDURE — 99213 OFFICE O/P EST LOW 20 MIN: CPT | Performed by: PHYSICIAN ASSISTANT

## 2017-09-06 RX ORDER — HYDROCODONE BITARTRATE AND ACETAMINOPHEN 5; 325 MG/1; MG/1
1 TABLET ORAL EVERY 4 HOURS PRN
Qty: 20 TABLET | Refills: 0 | Status: CANCELLED | OUTPATIENT
Start: 2017-09-06

## 2017-09-06 RX ORDER — CEPHALEXIN 500 MG/1
500 CAPSULE ORAL 3 TIMES DAILY
Qty: 30 CAPSULE | Refills: 0 | Status: SHIPPED | OUTPATIENT
Start: 2017-09-06 | End: 2017-10-18

## 2017-09-06 NOTE — NURSING NOTE
"Chief Complaint   Patient presents with     UTI       Initial /62 (BP Location: Left arm, Patient Position: Chair, Cuff Size: Adult Small)  Pulse 55  Temp 97.5  F (36.4  C) (Oral)  Resp 14  Wt 125 lb (56.7 kg)  BMI 22.14 kg/m2 Estimated body mass index is 22.14 kg/(m^2) as calculated from the following:    Height as of 8/11/17: 5' 3\" (1.6 m).    Weight as of this encounter: 125 lb (56.7 kg).  Medication Reconciliation: complete    "

## 2017-09-06 NOTE — PROGRESS NOTES
SUBJECTIVE:   Daya Ignacio is a 86 year old female who presents to clinic today for the following health issues:      URINARY TRACT SYMPTOMS  Onset: 1 day    Description:   Painful urination (Dysuria): YES  Blood in urine (Hematuria): no   Delay in urine (Hesitency): no     Intensity: 8/10    Progression of Symptoms:  intermittent    Accompanying Signs & Symptoms:  Fever/chills: no   Flank pain no   Nausea and vomiting: no   Any vaginal symptoms: vaginal discomfort, no pain  Abdominal/Pelvic Pain: no     History:   History of frequent UTI's: YES  History of kidney stones: no   Sexually Active: no   Possibility of pregnancy: No    Precipitating factors:       Therapies Tried and outcome: Cranberry pills and Cranberry juice prn (contraindicated in Coumadin patients)      Of note, pt was previous pt of Dr. Caballero's, cannot drive to his location currently.    Problem list and histories reviewed & adjusted, as indicated.  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     Hypertension goal BP (blood pressure) < 140/90     Chronic pain     NSTEMI (non-ST elevated myocardial infarction) (H)     Urinary tract infection without hematuria     Hip pain     Trochanteric fracture of left femur (H)     Diarrhea     Past Surgical History:   Procedure Laterality Date     C NONSPECIFIC PROCEDURE      lt hip pain     ORTHOPEDIC SURGERY         Social History   Substance Use Topics     Smoking status: Never Smoker     Smokeless tobacco: Never Used     Alcohol use No     Family History   Problem Relation Age of Onset     Alzheimer Disease Mother      mild     Cardiovascular Father      heart attack     Neurologic Disorder Brother 83      Parkinson's             Reviewed and updated as needed this visit by clinical staff       Reviewed and updated as needed this visit by Provider         ROS:  Constitutional, HEENT, cardiovascular, pulmonary, gi and gu systems are negative, except as otherwise noted.      OBJECTIVE:   /62 (BP Location: Left arm, Patient Position: Chair, Cuff Size: Adult Small)  Pulse 55  Temp 97.5  F (36.4  C) (Oral)  Resp 14  Wt 125 lb (56.7 kg)  BMI 22.14 kg/m2  Body mass index is 22.14 kg/(m^2).  GENERAL APPEARANCE: healthy, alert and no distress  RESP: lungs clear to auscultation - no rales, rhonchi or wheezes  CV: regular rates and rhythm, normal S1 S2, no S3 or S4 and no murmur, click or rub  ABDOMEN: soft, nontender, without hepatosplenomegaly or masses and bowel sounds normal    Diagnostic Test Results:  Results for orders placed or performed in visit on 09/06/17 (from the past 24 hour(s))   UA reflex to Microscopic and Culture   Result Value Ref Range    Color Urine Yellow     Appearance Urine Cloudy     Glucose Urine Negative NEG^Negative mg/dL    Bilirubin Urine Negative NEG^Negative    Ketones Urine Negative NEG^Negative mg/dL    Specific Gravity Urine 1.015 1.003 - 1.035    Blood Urine Moderate (A) NEG^Negative    pH Urine 6.0 5.0 - 7.0 pH    Protein Albumin Urine 100 (A) NEG^Negative mg/dL    Urobilinogen Urine 0.2 0.2 - 1.0 EU/dL    Nitrite Urine Negative NEG^Negative    Leukocyte Esterase Urine Large (A) NEG^Negative    Source Midstream Urine    Urine Microscopic   Result Value Ref Range    WBC Urine >100 (A) OTO2^O - 2 /HPF    RBC Urine 10-25 (A) OTO2^O - 2 /HPF    Squamous Epithelial /LPF Urine Few FEW^Few /LPF    Bacteria Urine Many (A) NEG^Negative /HPF       ASSESSMENT/PLAN:             1. Dysuria  Fluids and rest.   Await UC    - UA reflex to Microscopic and Culture  - Urine Culture Aerobic Bacterial  - cephALEXin (KEFLEX) 500 MG capsule; Take 1 capsule (500 mg) by mouth 3 times daily  Dispense: 30  capsule; Refill: 0  - Urine Microscopic    2. Recurrent UTI  New referral given for urology Patient informed to call. The patient indicates understanding of these issues and agrees with the plan.    - UROLOGY ADULT REFERRAL  - cephALEXin (KEFLEX) 500 MG capsule; Take 1 capsule (500 mg) by mouth 3 times daily  Dispense: 30 capsule; Refill: 0        Marcy Soares PA-C, FARZANA  [facility]

## 2017-09-06 NOTE — MR AVS SNAPSHOT
After Visit Summary   9/6/2017    Daya Ignacio    MRN: [id number]           Patient Information     Date Of Birth          10/11/1930        Visit Information        Provider Department      9/6/2017 3:15 PM Marcy Soares PA-C [facility]        Today's Diagnoses     Dysuria    -  1    Recurrent UTI           Follow-ups after your visit        Additional Services     UROLOGY ADULT REFERRAL       Your provider has referred you to: [facility]: [facility] [place] [phone]   [email or web address]    Please be aware that coverage of these services is subject to the terms and limitations of your health insurance plan.  Call member services at your health plan with any benefit or coverage questions.      Please bring the following with you to your appointment:    (1) Any X-Rays, CTs or MRIs which have been performed.  Contact the facility where they were done to arrange for  prior to your scheduled appointment.    (2) List of current medications  (3) This referral request   (4) Any documents/labs given to you for this referral                  Your next 10 appointments already scheduled     Oct 06, 2017  3:15 PM CDT   Return Visit with Deacon Fleming MD   [facility]YSICIANS [facility] AT [place] ([facility])    [address] [address] [address] [phone]              Who to contact     If you have questions or need follow up information about today's clinic visit or your schedule please contact [facility] directly at [phone].  Normal or non-critical lab and imaging results will be communicated to you by MyChart, letter or phone within 4 business days after the clinic has received the results. If you do not hear from us within 7 days, please contact the clinic through MyChart or phone. If you have a critical or abnormal lab result, we will notify you by phone as soon as  "possible.  Submit refill requests through Synapp[employer] or call your pharmacy and they will forward the refill request to us. Please allow 3 business days for your refill to be completed.          Additional Information About Your Visit        Synappio Information     Synappio lets you send messages to your doctor, view your test results, renew your prescriptions, schedule appointments and more. To sign up, go to [email or web address] . Click on \"Log in\" on the left side of the screen, which will take you to the Welcome page. Then click on \"Sign up Now\" on the right side of the page.     You will be asked to enter the access code listed below, as well as some personal information. Please follow the directions to create your username and password.     Your access code is: [email or web address]  Expires: 2017  3:53 PM     Your access code will  in 90 days. If you need help or a new code, please call your [place] clinic or [phone].        Care EveryWhere ID     This is your Care EveryWhere ID. This could be used by other organizations to access your [place] medical records  [id number]        Your Vitals Were     Pulse Temperature Respirations BMI (Body Mass Index)          55 97.5  F (36.4  C) (Oral) 14 22.14 kg/m2         Blood Pressure from Last 3 Encounters:   17 154/62   17 116/66   07/10/17 136/82    Weight from Last 3 Encounters:   17 125 lb (56.7 kg)   17 127 lb (57.6 kg)   07/10/17 125 lb 6.4 oz (56.9 kg)              We Performed the Following     UA reflex to Microscopic and Culture     Urine Culture Aerobic Bacterial     UROLOGY ADULT REFERRAL        Primary Care Provider Office Phone # Fax #    Marcy Natalia Soares PA-C [phone] [address] [facility] [address]        Equal Access to Services     MARIZOL AMOR : Aniket Holt, waaxda luqadaha, qaybta kaalmakinga benson . So [facility] " [phone].    ATENCIÓN: Si india barnett, tiene a cohen disposición servicios gratuitos de asistencia lingüística. Violette amaya [phone].    We comply with applicable federal civil rights laws and [place] laws. We do not discriminate on the basis of race, color, national origin, age, disability sex, sexual orientation or gender identity.            Thank you!     Thank you for choosing [facility]  for your care. Our goal is always to provide you with excellent care. Hearing back from our patients is one way we can continue to improve our services. Please take a few minutes to complete the written survey that you may receive in the mail after your visit with us. Thank you!             Your Updated Medication List - Protect others around you: Learn how to safely use, store and throw away your medicines at www.disposemymeds.org.          This list is accurate as of: 9/6/17  3:53 PM.  Always use your most recent med list.                   Brand Name Dispense Instructions for use Diagnosis    acetaminophen 325 MG tablet    TYLENOL    100 tablet    Take 2 tablets (650 mg) by mouth every 4 hours as needed for mild pain    Closed fracture of trochanter of left femur, initial encounter (H)       AMLODIPINE BESYLATE PO      Take 10 mg by mouth daily        aspirin 81 MG EC tablet     30 tablet    Take 1 tablet (81 mg) by mouth daily    Non-STEMI (non-ST elevated myocardial infarction) (H)       cloNIDine 0.1 MG tablet    CATAPRES    180 tablet    Take 1 tablet (0.1 mg) by mouth 2 times daily    Essential hypertension       cyanocobalamin 1000 MCG tablet    vitamin  B-12     Take 1,000 mcg by mouth every morning        estradiol 0.1 MG/GM cream    ESTRACE    6 g    Place 2 g vaginally three times a week paraben-free    Symptomatic menopausal or female climacteric states       gabapentin 300 MG capsule    NEURONTIN    180 capsule    TAKE ONE CAPSULE BY MOUTH THREE TIMES A DAY    Neuropathy (H)        HYDROcodone-acetaminophen 5-325 MG per tablet    NORCO    20 tablet    Take 1 tablet by mouth every 4 hours as needed for moderate to severe pain    Hip pain, left       isosorbide dinitrate 10 MG tablet    ISORDIL    90 tablet    Take 1 tablet (10 mg) by mouth 3 times daily    Non-STEMI (non-ST elevated myocardial infarction) (H)       lisinopril 40 MG tablet    PRINIVIL/ZESTRIL    90 tablet    TAKE ONE TABLET BY MOUTH ONCE DAILY    Essential hypertension       loperamide 2 MG capsule    IMODIUM    20 capsule    Take 1 capsule (2 mg) by mouth 4 times daily as needed for diarrhea    Diarrhea, unspecified type       metoprolol 25 MG 24 hr tablet    TOPROL-XL    30 tablet    Take 1 tablet (25 mg) by mouth daily    HTN (hypertension), benign       PRESERVISION AREDS Caps      Take 1 capsule by mouth 2 times daily        sertraline 50 MG tablet    ZOLOFT    90 tablet    TAKE ONE TABLET BY MOUTH ONCE DAILY    Generalized anxiety disorder       vitamin D3 2000 UNITS Caps      Take 2,000 Units by mouth daily (with dinner)

## 2017-09-07 LAB
BACTERIA SPEC CULT: NORMAL
SPECIMEN SOURCE: NORMAL

## 2017-09-16 PROCEDURE — G0180 MD CERTIFICATION HHA PATIENT: HCPCS | Performed by: FAMILY MEDICINE

## 2017-09-22 DIAGNOSIS — G62.9 NEUROPATHY: ICD-10-CM

## 2017-09-22 NOTE — TELEPHONE ENCOUNTER
GABAPENTIN 300MG CAPS        Last Written Prescription Date: 05/10/17  Last Fill Quantity: 180,  # refills: 1   Last Office Visit with G, P or [facility] prescribing provider: 09/06/17 Marcy Soares                                         Next 5 appointments (look out 90 days)     Oct 06, 2017  3:15 PM CDT   Return Visit with Deacon Fleming MD   [facility] PHYSICIANS [facility] AT [place] ([facility])    [address] [address] [address] [phone]

## 2017-09-22 NOTE — TELEPHONE ENCOUNTER
Gabapentin    Routing refill request to provider for review/approval because:  Drug not on the [facility], [facility] or [facility] refill protocol or controlled substance      RX monitoring program ([place]) reviewed:  reviewed- no concerns    [place] profile:  [email or web address]      Joyce PINO RN, BSN, PHN  [place] Flex RN

## 2017-09-26 RX ORDER — GABAPENTIN 300 MG/1
CAPSULE ORAL
Qty: 180 CAPSULE | Refills: 1 | Status: SHIPPED | OUTPATIENT
Start: 2017-09-26 | End: 2017-12-11 | Stop reason: DRUGHIGH

## 2017-10-03 ENCOUNTER — TELEPHONE (OUTPATIENT)
Dept: FAMILY MEDICINE | Facility: CLINIC | Age: 82
End: 2017-10-03

## 2017-10-03 DIAGNOSIS — M70.70 BURSITIS OF HIP, UNSPECIFIED BURSA, UNSPECIFIED LATERALITY: ICD-10-CM

## 2017-10-03 RX ORDER — ACETAMINOPHEN AND CODEINE PHOSPHATE 300; 30 MG/1; MG/1
1 TABLET ORAL EVERY 8 HOURS PRN
Qty: 270 TABLET | Refills: 0 | Status: SHIPPED | OUTPATIENT
Start: 2017-10-03 | End: 2018-01-02

## 2017-10-03 NOTE — TELEPHONE ENCOUNTER
Pt does not want to take the Norco anymore; she would like to go back to the T3    Controlled Substance Refill Request for T3  Problem List Complete:  Yes  Patient is followed by Marcy Soares PA-C, FARZANA for ongoing prescription of pain medication.  All refills should only be approved by this provider, or covering partner.    Medication(s): acetaminophen-codeine (TYLENOL/CODEINE #3) 300-30 MG per tablet   Maximum quantity per month: 270  Clinic visit frequency required: Q 6  months     Controlled substance agreement:  Encounter-Level CSA:     There are no encounter-level csa.        Pain Clinic evaluation in the past: No    DIRE Total Score(s):  No flowsheet data found.    Last [facility] website verification:  done on 3/10/2017   [email or web address]       checked in past 6 months?  Yes 3/10/17     Elma Yates, Pharmacy [facility]

## 2017-10-04 ENCOUNTER — TELEPHONE (OUTPATIENT)
Dept: FAMILY MEDICINE | Facility: CLINIC | Age: 82
End: 2017-10-04

## 2017-10-18 ENCOUNTER — ALLIED HEALTH/NURSE VISIT (OUTPATIENT)
Dept: PHARMACY | Facility: CLINIC | Age: 82
End: 2017-10-18
Payer: COMMERCIAL

## 2017-10-18 DIAGNOSIS — I21.4 NSTEMI (NON-ST ELEVATED MYOCARDIAL INFARCTION) (H): ICD-10-CM

## 2017-10-18 DIAGNOSIS — G89.29 OTHER CHRONIC PAIN: Primary | ICD-10-CM

## 2017-10-18 DIAGNOSIS — I10 ESSENTIAL HYPERTENSION: ICD-10-CM

## 2017-10-18 DIAGNOSIS — E55.9 VITAMIN D DEFICIENCY: ICD-10-CM

## 2017-10-18 DIAGNOSIS — F41.1 GENERALIZED ANXIETY DISORDER: ICD-10-CM

## 2017-10-18 DIAGNOSIS — Z23 ENCOUNTER FOR IMMUNIZATION: ICD-10-CM

## 2017-10-18 PROCEDURE — 99605 MTMS BY PHARM NP 15 MIN: CPT | Performed by: PHARMACIST

## 2017-10-18 PROCEDURE — 99607 MTMS BY PHARM ADDL 15 MIN: CPT | Performed by: PHARMACIST

## 2017-10-18 RX ORDER — IBUPROFEN 200 MG
200 TABLET ORAL EVERY 4 HOURS PRN
Status: ON HOLD | COMMUNITY
End: 2019-06-16

## 2017-10-18 NOTE — Clinical Note
ranjit--thanks for the mt referral --nice chat with mirian--see my plan for resolution for her.  alyssa

## 2017-10-18 NOTE — MR AVS SNAPSHOT
After Visit Summary   10/18/2017    Daya Ignacio    MRN: [id number]           Patient Information     Date Of Birth          10/11/1930        Visit Information        Provider Department      10/18/2017 11:00 AM Kayli Real RPH Formerly named [facility]        Care Instructions    Dear Daya,     It was a pleasure talking with you today regarding your medications and medical concerns. The following is a summary of what we discussed.     1.  FYI--Please do a trial of weaning off Gabapentin --we are unsure if it helps ?  Lets wean you off as follows --1 x300mg gabapentin 2 x day x  Days , then 1 x 300mg x 5 days --then go off for 30 days.     2. Please increase your Vitamin D to 2,000 units /day .     3. FYI--For your blood pressure pills --OK to take Lisinopril 40mg. And metoprolol 25mg. In AM along with 1-Clonidine 0.1mg, then take 2nd Clonidine 0.1mg in PM, and Amlodipine 10mg at bedtime.     4. FYI-- Please have your Prevnar 13 -pneumonia vaccine the next time your in our  Clinic .     Follow-up per phone in 1 month : Wednesday November 8th at 2pm .     Thank you for your time and please feel free to call ([phone] voicemail/pager or [phone] clinic main line) or e-mail ([email or web address]) with any questions.       Kayli Real bambi.  Medication Therapy Management Provider  [phone]              Follow-ups after your visit        Your next 10 appointments already scheduled     Nov 08, 2017  2:00 PM CST   TELEMEDICINE with Kayli Real RPH   Formerly named [facility] ([facility])    [address] [place] [address] [phone]           Note: this is not an onsite visit; there is no need to come to the facility.              Who to contact     If you have questions or need follow up information about today's clinic visit or your schedule please contact [facility] directly at  "[phone].  Normal or non-critical lab and imaging results will be communicated to you by MyChart, letter or phone within 4 business days after the clinic has received the results. If you do not hear from us within 7 days, please contact the clinic through [employer] or phone. If you have a critical or abnormal lab result, we will notify you by phone as soon as possible.  Submit refill requests through Semasio or call your pharmacy and they will forward the refill request to us. Please allow 3 business days for your refill to be completed.          Additional Information About Your Visit        [employer] Information     Semasio lets you send messages to your doctor, view your test results, renew your prescriptions, schedule appointments and more. To sign up, go to [email or web address] . Click on \"Log in\" on the left side of the screen, which will take you to the Welcome page. Then click on \"Sign up Now\" on the right side of the page.     You will be asked to enter the access code listed below, as well as some personal information. Please follow the directions to create your username and password.     Your access code is: [email or web address]  Expires: 2017  3:53 PM     Your access code will  in 90 days. If you need help or a new code, please call your [place] clinic or [phone].        Care EveryWhere ID     This is your Care EveryWhere ID. This could be used by other organizations to access your [place] medical records  [id number]         Blood Pressure from Last 3 Encounters:   17 154/62   17 116/66   07/10/17 136/82    Weight from Last 3 Encounters:   17 125 lb (56.7 kg)   17 127 lb (57.6 kg)   07/10/17 125 lb 6.4 oz (56.9 kg)              Today, you had the following     No orders found for display       Primary Care Provider Office Phone # Fax #    Marcy Soares PA-C [phone] [address] [facility] [address]        Equal Access to " Services     [facility]: Hadii piper villatoro conniejose Ariannaali, waaxda luqadaha, qaybta kaalmada jose enriquebettyosito, kinga olvera . So [facility] [phone].    ATENCIÓN: Si india barnett, tiene a cohen disposición servicios gratuitos de asistencia lingüística. Llame al [phone].    We comply with applicable federal civil rights laws and [place] laws. We do not discriminate on the basis of race, color, national origin, age, disability, sex, sexual orientation, or gender identity.            Thank you!     Thank you for choosing [facility]  for your care. Our goal is always to provide you with excellent care. Hearing back from our patients is one way we can continue to improve our services. Please take a few minutes to complete the written survey that you may receive in the mail after your visit with us. Thank you!             Your Updated Medication List - Protect others around you: Learn how to safely use, store and throw away your medicines at www.disposemymeds.org.          This list is accurate as of: 10/18/17 11:54 AM.  Always use your most recent med list.                   Brand Name Dispense Instructions for use Diagnosis    acetaminophen-codeine 300-30 MG per tablet    TYLENOL WITH CODEINE #3    270 tablet    Take 1 tablet by mouth every 8 hours as needed for mild pain    Bursitis of hip, unspecified bursa, unspecified laterality       ADVIL 200 MG tablet   Generic drug:  ibuprofen      Take 200 mg by mouth every 4 hours as needed for mild pain or pain        AMLODIPINE BESYLATE PO      Take 10 mg by mouth daily        aspirin 81 MG EC tablet     30 tablet    Take 1 tablet (81 mg) by mouth daily    Non-STEMI (non-ST elevated myocardial infarction) (H)       CALCIUM 600 PO      Take 1 tablet by mouth daily        cloNIDine 0.1 MG tablet    CATAPRES    180 tablet    Take 1 tablet (0.1 mg) by mouth 2 times daily    Essential hypertension       cyanocobalamin 1000 MCG tablet     vitamin  B-12     Take 1,000 mcg by mouth every morning        estradiol 0.1 MG/GM cream    ESTRACE    6 g    Place 2 g vaginally three times a week paraben-free    Symptomatic menopausal or female climacteric states       gabapentin 300 MG capsule    NEURONTIN    180 capsule    TAKE ONE CAPSULE BY MOUTH THREE TIMES A DAY    Neuropathy       isosorbide dinitrate 10 MG tablet    ISORDIL    90 tablet    Take 1 tablet (10 mg) by mouth 3 times daily    Non-STEMI (non-ST elevated myocardial infarction) (H)       lisinopril 40 MG tablet    PRINIVIL/ZESTRIL    90 tablet    TAKE ONE TABLET BY MOUTH ONCE DAILY    Essential hypertension       loperamide 2 MG capsule    IMODIUM    20 capsule    Take 1 capsule (2 mg) by mouth 4 times daily as needed for diarrhea    Diarrhea, unspecified type       metoprolol 25 MG 24 hr tablet    TOPROL-XL    30 tablet    Take 1 tablet (25 mg) by mouth daily    HTN (hypertension), benign       PRESERVISION AREDS Caps      Take 1 capsule by mouth 2 times daily        sertraline 50 MG tablet    ZOLOFT    90 tablet    TAKE ONE TABLET BY MOUTH ONCE DAILY    Generalized anxiety disorder       vitamin D3 2000 UNITS Caps      Take 2,000 Units by mouth daily (with dinner)

## 2017-10-18 NOTE — PROGRESS NOTES
SUBJECTIVE/OBJECTIVE:                           Daya Ignacio is a 87 year old female called for an initial visit for Medication Therapy Management.  She was referred to me from GIO Soares.     She is an [facility] 2017 patient.         Chief Complaint:  Pain issues and displeasure with last hospital visit --felt they let her go when she was still ill.       Personal Healthcare Goals: ambulatory--stay out of the hospital.     Allergies/ADRs: Reviewed in Epic  Tobacco: No tobacco use; quit 35 years - socially only 1 ppw. Smoked about 20 years.     Alcohol: not currently using  Caffeine: 2 cups/day of coffee  Activity: moves around   PMH: Reviewed in Epic    Medication Adherence: no issues reported    Hypertension: Current medications include Amlodipine 10mg /day-am, .clonidine 0.1mg bid -am and pm-, Lisinopril 40mg qday-am, metoprolol xl 25mg daily-am.  Patient does self-monitor BP.   Patient reports no current medication side effects.      OA; legs -painful - gabapentin not doing much -- 300mg -3 x day - calves to toes. denies statin drug/or smoking issues. She's been on it a year --not helping.   She takes tylenol /codiene at night 1 at bedtime and maybe 1 in middle night . She takes prn advil.  The drugs take the edge off her pain.   She states pins/needles in her feet --gabapentin not helping.    Angina:  Takes isordil 10mg tid , no sl nitro.       Depression/Anxiety:  Weeps a lot in past -- sertraline 50mg qday helps . Only gets nervous when son flys oversees --it bothers her until she hears from him.     Immunizations: had her 2017 high dose flu shot in September , is due for prevnar shot now.     Vitamin D3: level has not been drawn.  She takes 1,000 iu's/day , she has aches/pains in calves --unknown origin.         Current labs include:BP Readings from Last 3 Encounters:   09/06/17 154/62   08/11/17 116/66   07/10/17 136/82     Today's Vitals: There were no vitals taken for this visit.  Lab Results   Component Value  Date    A1C 5.3 01/13/2017   .  Lab Results   Component Value Date    CHOL 186 01/13/2017     Lab Results   Component Value Date    TRIG 94 01/13/2017     Lab Results   Component Value Date    HDL 57 01/13/2017     Lab Results   Component Value Date     01/13/2017       Liver Function Studies -   Recent Labs   Lab Test  06/20/17   1259   PROTTOTAL  6.9   ALBUMIN  3.5   BILITOTAL  1.5*   ALKPHOS  78   AST  11   ALT  19       Lab Results   Component Value Date    UCRR 112 08/31/2012    MICROL 16 08/31/2012    UMALCR 14.29 08/31/2012       Last Basic Metabolic Panel:  Lab Results   Component Value Date     06/30/2017      Lab Results   Component Value Date    POTASSIUM 3.9 06/30/2017     Lab Results   Component Value Date    CHLORIDE 101 06/30/2017     Lab Results   Component Value Date    BUN 16 06/30/2017     Lab Results   Component Value Date    CR 0.49 06/30/2017     GFR Estimate   Date Value Ref Range Status   06/30/2017 >90  Non  GFR Calc   >60 mL/min/1.7m2 Final   06/28/2017 >90  Non  GFR Calc   >60 mL/min/1.7m2 Final   06/24/2017 >90  Non  GFR Calc   >60 mL/min/1.7m2 Final     GFR Estimate If Black   Date Value Ref Range Status   06/30/2017 >90   GFR Calc   >60 mL/min/1.7m2 Final   06/28/2017 >90   GFR Calc   >60 mL/min/1.7m2 Final   06/24/2017 >90   GFR Calc   >60 mL/min/1.7m2 Final     TSH   Date Value Ref Range Status   08/23/2013 0.91 0.4 - 5.0 mU/L Final   ]    Most Recent Immunizations   Administered Date(s) Administered     Influenza (H1N1) 01/06/2010     Influenza (High Dose) 3 valent vaccine 09/15/2015     Pneumococcal 23 valent 09/10/2010     TD (ADULT, 7+) 09/10/2010     Zoster vaccine, live 09/06/2013   fyi--she received her 2017 high dose flu shot -September 2017!    ASSESSMENT:                             Current medications were reviewed today.       Medication Adherence: no issues  identified    Hypertension: Needs Improvement. Patient is not meeting BP goal of < 150/90mmHg.  Pt would benefit from the following changes - continued BP monitoring, watching diet, increasing exercise and weight loss and sodium restriction  Also--mtm will help spread her daily BP meds out --see plan for details.     OA: patient doesn't feel gabapentin helps at all , will wean off to see if she feels a difference ? See plan .     Angina: stable     Depression/Anxiety: Stable.     Immunizations:  Had flu this year --due for prevnar -13 next clinic visit.     Vitamin D3: have lab draw next OV, increase daily dose to 2,000 iu's now.       PLAN:                            1.  FYI--Please do a trial of weaning off Gabapentin --we are unsure if it helps ?  Lets wean you off as follows --1 x300mg gabapentin 2 x day x  Days , then 1 x 300mg x 5 days --then go off for 30 days.     2. Please increase your Vitamin D to 2,000 units /day .     3. FYI--For your blood pressure pills --OK to take Lisinopril 40mg. And metoprolol 25mg. In AM along with 1-Clonidine 0.1mg, then take 2nd Clonidine 0.1mg in PM, and Amlodipine 10mg at bedtime.     4. FYI-- Please have your Prevnar 13 -pneumonia vaccine the next time your in our  Clinic .     Follow-up per phone in 1 month : Wednesday November 8th at 2pm .       I spent 60 minutes with this patient today  . All changes were made via collaborative practice agreement with aMrcy Soares. A copy of the visit note was provided to the patient's primary care provider.        The patient was mailed a summary of these recommendations as an after visit summary.     Kayli Real [facility].  Medication Therapy Management Provider  [phone]

## 2017-10-18 NOTE — PATIENT INSTRUCTIONS
Dear Daya,     It was a pleasure talking with you today regarding your medications and medical concerns. The following is a summary of what we discussed.     1.  FYI--Please do a trial of weaning off Gabapentin --we are unsure if it helps ?  Lets wean you off as follows --1 x300mg gabapentin 2 x day x  Days , then 1 x 300mg x 5 days --then go off for 30 days.     2. Please increase your Vitamin D to 2,000 units /day .     3. FYI--For your blood pressure pills --OK to take Lisinopril 40mg. And metoprolol 25mg. In AM along with 1-Clonidine 0.1mg, then take 2nd Clonidine 0.1mg in PM, and Amlodipine 10mg at bedtime.     4. FYI-- Please have your Prevnar 13 -pneumonia vaccine the next time your in our  Clinic .     Follow-up per phone in 1 month : Wednesday November 8th at 2pm .     Thank you for your time and please feel free to call ([phone] voicemail/pager or [phone] clinic main line) or e-mail ([email or web address]) with any questions.       Kayli Real [facility].  Medication Therapy Management Provider  [phone]

## 2017-10-18 NOTE — LETTER
[facility]  [address]  [place] [address]  Phone: [phone]        Daya Ignacio   [address]   [facility] [address]      Daya,      It was a pleasure speaking with you today --please follow the suggestions we spoke of over the phone that I enclosed in this letter.    I will follow-up with you over the phone on Wednesday November 8th at 2pm.    Please call me before then if any problems or questions --call my voicemail at [phone].    Thank you , Kayli Real [facility].  Medication Therapy Management Provider  [phone]

## 2017-11-06 ENCOUNTER — TELEPHONE (OUTPATIENT)
Dept: FAMILY MEDICINE | Facility: CLINIC | Age: 82
End: 2017-11-06

## 2017-11-06 DIAGNOSIS — I21.4 NON-STEMI (NON-ST ELEVATED MYOCARDIAL INFARCTION) (H): ICD-10-CM

## 2017-11-06 RX ORDER — AMLODIPINE BESYLATE 10 MG/1
10 TABLET ORAL DAILY
Qty: 90 TABLET | Refills: 3 | Status: SHIPPED | OUTPATIENT
Start: 2017-11-06 | End: 2018-09-07

## 2017-11-06 RX ORDER — ISOSORBIDE DINITRATE 10 MG/1
10 TABLET ORAL 3 TIMES DAILY
Qty: 270 TABLET | Refills: 3 | Status: SHIPPED | OUTPATIENT
Start: 2017-11-06 | End: 2018-09-07

## 2017-11-06 NOTE — TELEPHONE ENCOUNTER
I got this message for Elma, she sounds like she is following up with you Kuldeep, is she taking her medications regularly? Shall I be concerned about prescribing these medications for her ?  Dee Dee Omer MD  [facility]  [phone]

## 2017-11-06 NOTE — TELEPHONE ENCOUNTER
Pt is requesting refills on Amlodipine 10mg - one tablet daily and Isosorbide Dinitrate 10mg - one tablet three times daily.  These were last prescribed by Dr. Mcmanus at the hospital; would you like to continue therapy? Thank you!  Elma Yates, Pharmacy [facility]

## 2017-11-08 ENCOUNTER — ALLIED HEALTH/NURSE VISIT (OUTPATIENT)
Dept: PHARMACY | Facility: CLINIC | Age: 82
End: 2017-11-08
Payer: COMMERCIAL

## 2017-11-08 DIAGNOSIS — I21.4 NSTEMI (NON-ST ELEVATED MYOCARDIAL INFARCTION) (H): ICD-10-CM

## 2017-11-08 DIAGNOSIS — I10 HYPERTENSION GOAL BP (BLOOD PRESSURE) < 140/90: ICD-10-CM

## 2017-11-08 DIAGNOSIS — G62.9 NEUROPATHY: Primary | ICD-10-CM

## 2017-11-08 DIAGNOSIS — E55.9 VITAMIN D DEFICIENCY: ICD-10-CM

## 2017-11-08 DIAGNOSIS — F41.1 GENERALIZED ANXIETY DISORDER: ICD-10-CM

## 2017-11-08 DIAGNOSIS — Z23 ENCOUNTER FOR IMMUNIZATION: ICD-10-CM

## 2017-11-08 PROCEDURE — 99606 MTMS BY PHARM EST 15 MIN: CPT | Mod: U4 | Performed by: PHARMACIST

## 2017-11-08 PROCEDURE — 99607 MTMS BY PHARM ADDL 15 MIN: CPT | Mod: U4 | Performed by: PHARMACIST

## 2017-11-08 RX ORDER — GABAPENTIN 600 MG/1
600 TABLET ORAL 3 TIMES DAILY
Qty: 90 TABLET | Refills: 5 | Status: SHIPPED | OUTPATIENT
Start: 2017-11-08 | End: 2017-12-11 | Stop reason: DRUGHIGH

## 2017-11-08 NOTE — PATIENT INSTRUCTIONS
Dear Daya,     It was a pleasure talking with you today regarding your medications and medical concerns. The following is a summary of what we discussed.     1. Thank you for increasing your Vitamin D daily dose and changing your Amlodipine to bedtime .   2. Next time your in the clinic please have your Prevnar-13 -pneumonia vaccine.   3. Due to your increasing leg pains --lets increase your Gabapentin dose to 600mg. -1 tablet 3 x day  .  Watch for increased dizziness or drowsiness with this dose increase. Call me if it occurs.     Follow-up via telephone call on Monday December 4th at 2pm.     Thank you for your time and please feel free to call ([phone] voicemail/pager or [phone] clinic main line) or e-mail ([email or web address]) with any questions.     Kayli Real Rph.  Medication Therapy Management Provider  [phone]

## 2017-11-08 NOTE — MR AVS SNAPSHOT
After Visit Summary   11/8/2017    Daya Ignacio    MRN: [id number]           Patient Information     Date Of Birth          10/11/1930        Visit Information        Provider Department      11/8/2017 2:00 PM Kayli Real RPH [facility]        Today's Diagnoses     Neuropathy    -  1      Care Instructions    Dear Dyaa,     It was a pleasure talking with you today regarding your medications and medical concerns. The following is a summary of what we discussed.     1. Thank you for increasing your Vitamin D daily dose and changing your Amlodipine to bedtime .   2. Next time your in the clinic please have your Prevnar-13 -pneumonia vaccine.   3. Due to your increasing leg pains --lets increase your Gabapentin dose to 600mg. -1 tablet 3 x day  .  Watch for increased dizziness or drowsiness with this dose increase. Call me if it occurs.     Follow-up via telephone call on Monday December 4th at 2pm.     Thank you for your time and please feel free to call ([phone] voicemail/pager or [phone] clinic main line) or e-mail ([email or web address]) with any questions.     Kayli Real Rph.  Medication Therapy Management Provider  [phone]                    Follow-ups after your visit        Your next 10 appointments already scheduled     Dec 04, 2017  2:00 PM CST   TELEMEDICINE with Kayli Real RPH   [facility] ([facility])    [address] [address] [phone]           Note: this is not an onsite visit; there is no need to come to the facility.              Who to contact     If you have questions or need follow up information about today's clinic visit or your schedule please contact [facility] directly at [phone].  Normal or non-critical lab and imaging results will be communicated to you by MyChart, letter or phone within 4 business days after the clinic has  "received the results. If you do not hear from us within 7 days, please contact the clinic through [employer] or phone. If you have a critical or abnormal lab result, we will notify you by phone as soon as possible.  Submit refill requests through [employer] or call your pharmacy and they will forward the refill request to us. Please allow 3 business days for your refill to be completed.          Additional Information About Your Visit        [employer] Information     [employer] lets you send messages to your doctor, view your test results, renew your prescriptions, schedule appointments and more. To sign up, go to [email or web address] . Click on \"Log in\" on the left side of the screen, which will take you to the Welcome page. Then click on \"Sign up Now\" on the right side of the page.     You will be asked to enter the access code listed below, as well as some personal information. Please follow the directions to create your username and password.     Your access code is: [email or web address]  Expires: 2017  2:53 PM     Your access code will  in 90 days. If you need help or a new code, please call your [place] clinic or [phone].        Care EveryWhere ID     This is your Care EveryWhere ID. This could be used by other organizations to access your [place] medical records  [id number]         Blood Pressure from Last 3 Encounters:   17 154/62   17 116/66   07/10/17 136/82    Weight from Last 3 Encounters:   17 125 lb (56.7 kg)   17 127 lb (57.6 kg)   07/10/17 125 lb 6.4 oz (56.9 kg)              Today, you had the following     No orders found for display         Today's Medication Changes          These changes are accurate as of: 17  2:44 PM.  If you have any questions, ask your nurse or doctor.               These medicines have changed or have updated prescriptions.        Dose/Directions    * gabapentin 300 MG capsule   Commonly known as:  NEURONTIN   This may have changed:  Another " medication with the same name was added. Make sure you understand how and when to take each.   Used for:  Neuropathy        TAKE ONE CAPSULE BY MOUTH THREE TIMES A DAY   Quantity:  180 capsule   Refills:  1       * gabapentin 600 MG tablet   Commonly known as:  NEURONTIN   This may have changed:  You were already taking a medication with the same name, and this prescription was added. Make sure you understand how and when to take each.   Used for:  Neuropathy        Dose:  600 mg   Take 1 tablet (600 mg) by mouth 3 times daily   Quantity:  90 tablet   Refills:  5       * Notice:  This list has 2 medication(s) that are the same as other medications prescribed for you. Read the directions carefully, and ask your doctor or other care provider to review them with you.         Where to get your medicines      These medications were sent to [facility] [address]  [id number] [place] [address]     Phone:  [phone]     gabapentin 600 MG tablet                Primary Care Provider Office Phone # Fax #    Marcy Soares PA-C [phone] [address] [facility] [address]        Equal Access to Services     [facility] HospitalBERNARDO : Hadii piper villatoro hadasho Sonaomi, waaxda luqadaha, qaybta kaalmada adeeliceo, kinga lambert. So [facility] [phone].    ATENCIÓN: Si habla español, tiene a cohen disposición servicios gratuitos de asistencia lingüística. Violette al [phone].    We comply with applicable federal civil rights laws and [place] laws. We do not discriminate on the basis of race, color, national origin, age, disability, sex, sexual orientation, or gender identity.            Thank you!     Thank you for choosing [facility]  for your care. Our goal is always to provide you with excellent care. Hearing back from our patients is one way we can continue to improve our services. Please take a few minutes to  complete the written survey that you may receive in the mail after your visit with us. Thank you!             Your Updated Medication List - Protect others around you: Learn how to safely use, store and throw away your medicines at www.disposemymeds.org.          This list is accurate as of: 11/8/17  2:44 PM.  Always use your most recent med list.                   Brand Name Dispense Instructions for use Diagnosis    acetaminophen-codeine 300-30 MG per tablet    TYLENOL WITH CODEINE #3    270 tablet    Take 1 tablet by mouth every 8 hours as needed for mild pain    Bursitis of hip, unspecified bursa, unspecified laterality       ADVIL 200 MG tablet   Generic drug:  ibuprofen      Take 200 mg by mouth every 4 hours as needed for mild pain or pain        amLODIPine 10 MG tablet    NORVASC    90 tablet    Take 1 tablet (10 mg) by mouth daily    Non-STEMI (non-ST elevated myocardial infarction) (H)       aspirin 81 MG EC tablet     30 tablet    Take 1 tablet (81 mg) by mouth daily    Non-STEMI (non-ST elevated myocardial infarction) (H)       CALCIUM 600 PO      Take 1 tablet by mouth daily        cloNIDine 0.1 MG tablet    CATAPRES    180 tablet    Take 1 tablet (0.1 mg) by mouth 2 times daily    Essential hypertension       cyanocobalamin 1000 MCG tablet    vitamin  B-12     Take 1,000 mcg by mouth every morning        estradiol 0.1 MG/GM cream    ESTRACE    6 g    Place 2 g vaginally three times a week paraben-free    Symptomatic menopausal or female climacteric states       * gabapentin 300 MG capsule    NEURONTIN    180 capsule    TAKE ONE CAPSULE BY MOUTH THREE TIMES A DAY    Neuropathy       * gabapentin 600 MG tablet    NEURONTIN    90 tablet    Take 1 tablet (600 mg) by mouth 3 times daily    Neuropathy       isosorbide dinitrate 10 MG tablet    ISORDIL    270 tablet    Take 1 tablet (10 mg) by mouth 3 times daily    Non-STEMI (non-ST elevated myocardial infarction) (H)       lisinopril 40 MG tablet     PRINIVIL/ZESTRIL    90 tablet    TAKE ONE TABLET BY MOUTH ONCE DAILY    Essential hypertension       loperamide 2 MG capsule    IMODIUM    20 capsule    Take 1 capsule (2 mg) by mouth 4 times daily as needed for diarrhea    Diarrhea, unspecified type       metoprolol 25 MG 24 hr tablet    TOPROL-XL    30 tablet    Take 1 tablet (25 mg) by mouth daily    HTN (hypertension), benign       PRESERVISION AREDS Caps      Take 1 capsule by mouth 2 times daily        sertraline 50 MG tablet    ZOLOFT    90 tablet    TAKE ONE TABLET BY MOUTH ONCE DAILY    Generalized anxiety disorder       vitamin D3 2000 UNITS Caps      Take 2,000 Units by mouth daily (with dinner)        * Notice:  This list has 2 medication(s) that are the same as other medications prescribed for you. Read the directions carefully, and ask your doctor or other care provider to review them with you.

## 2017-11-08 NOTE — PROGRESS NOTES
SUBJECTIVE/OBJECTIVE:                           Daya Ignacio is a 87 year old female called for a follow-up (10-17) visit for Medication Therapy Management.  She was referred to me from GIO Soares.     She is an [facility] 2017 patient.         Chief Complaint:  She states she went off gabapentin and leg pains are worse --so restarted it --but still in discomfort --she'd like an increase dose !    She also increased her vitamin-D and changed to take her BP med -amlodipine at hs.       Personal Healthcare Goals: ambulatory--stay out of the hospital.     Allergies/ADRs: Reviewed in Epic  Tobacco: No tobacco use; quit 35 years - socially only 1 ppw. Smoked about 20 years.     Alcohol: not currently using  Caffeine: 2 cups/day of coffee  Activity: moves around   PMH: Reviewed in Epic    Medication Adherence: no issues reported    Hypertension: Current medications include Amlodipine 10mg /day-qhs now, clonidine 0.1mg bid -am and pm-, Lisinopril 40mg qday-am, metoprolol xl 25mg daily-am.  Patient does self-monitor BP.   Patient reports no current medication side effects.      OA/Neuropathy; legs -painful - gabapentin not doing much -- 300mg -3 x day - calves to toes. denies statin drug/or smoking issues. She's been on it a year --not helping. She went off it for a few weeks as a trial per mtm and leg pains worsened --she's back on it but still in too much pain--can she get a higher dose today she states?    She takes tylenol /codiene at night 1 at bedtime and maybe 1 in middle night . She takes prn advil.  The drugs take the edge off her pain.   She states pins/needles in her feet .    Angina:  Takes isordil 10mg tid , no sl nitro.       Depression/Anxiety:  Weeps a lot in past -- sertraline 50mg qday helps . Only gets nervous when son flys oversees --it bothers her until she hears from him.     Immunizations: had her 2017 high dose flu shot in September , is due for prevnar shot now at next clinic OV.      Vitamin D3: level has  not been drawn.  She takes 2,000 iu's/day , she has aches/pains in calves --unknown origin.         Current labs include:  BP Readings from Last 3 Encounters:   09/06/17 154/62   08/11/17 116/66   07/10/17 136/82     Today's Vitals: There were no vitals taken for this visit.  Lab Results   Component Value Date    A1C 5.3 01/13/2017   .  Lab Results   Component Value Date    CHOL 186 01/13/2017     Lab Results   Component Value Date    TRIG 94 01/13/2017     Lab Results   Component Value Date    HDL 57 01/13/2017     Lab Results   Component Value Date     01/13/2017       Liver Function Studies -   Recent Labs   Lab Test  06/20/17   1259   PROTTOTAL  6.9   ALBUMIN  3.5   BILITOTAL  1.5*   ALKPHOS  78   AST  11   ALT  19       Lab Results   Component Value Date    UCRR 112 08/31/2012    MICROL 16 08/31/2012    UMALCR 14.29 08/31/2012       Last Basic Metabolic Panel:  Lab Results   Component Value Date     06/30/2017      Lab Results   Component Value Date    POTASSIUM 3.9 06/30/2017     Lab Results   Component Value Date    CHLORIDE 101 06/30/2017     Lab Results   Component Value Date    BUN 16 06/30/2017     Lab Results   Component Value Date    CR 0.49 06/30/2017     GFR Estimate   Date Value Ref Range Status   06/30/2017 >90  Non  GFR Calc   >60 mL/min/1.7m2 Final   06/28/2017 >90  Non  GFR Calc   >60 mL/min/1.7m2 Final   06/24/2017 >90  Non  GFR Calc   >60 mL/min/1.7m2 Final     GFR Estimate If Black   Date Value Ref Range Status   06/30/2017 >90   GFR Calc   >60 mL/min/1.7m2 Final   06/28/2017 >90   GFR Calc   >60 mL/min/1.7m2 Final   06/24/2017 >90   GFR Calc   >60 mL/min/1.7m2 Final     TSH   Date Value Ref Range Status   08/23/2013 0.91 0.4 - 5.0 mU/L Final   ]    Most Recent Immunizations   Administered Date(s) Administered     Influenza (H1N1) 01/06/2010     Influenza (High Dose) 3 valent vaccine  09/15/2015     Pneumococcal 23 valent 09/10/2010     TD (ADULT, 7+) 09/10/2010     Zoster vaccine, live 09/06/2013   fyi--she received her 2017 high dose flu shot -September 2017!    ASSESSMENT:                             Current medications were reviewed today.       Medication Adherence: no issues identified    Hypertension: Needs Improvement. Patient is not meeting BP goal of < 150/90mmHg.  Pt would benefit from the following changes - continued BP monitoring, watching diet, increasing exercise and weight loss and sodium restriction  She is now spreading out her bp meds per [facility] direction.     OA/Neuropathy: she definitely knows the garry helps but feels she needs a much higher dose --we discussed dosing options and possible se's to watch for --see plan for details.     Angina: stable     Depression/Anxiety: Stable.     Immunizations:  Had flu this year --due for prevnar -13 next clinic visit.     Vitamin D3: have lab draw next OV- reassess lab result and increase dose if needed.       PLAN:                            1. Thank you for increasing your Vitamin D daily dose and changing your Amlodipine to bedtime .   2. Next time your in the clinic please have your Prevnar-13 -pneumonia vaccine.   3. Due to your increasing leg pains --lets increase your Gabapentin dose to 600mg. -1 tablet 3 x day  .  Watch for increased dizziness or drowsiness with this dose increase. Call me if it occurs.     Follow-up via telephone call on Monday December 4th at 2pm.       I spent 25 minutes with this patient today  . All changes were made via collaborative practice agreement with Marcy Soares. A copy of the visit note was provided to the patient's primary care provider.        The patient was mailed a summary of these recommendations as an after visit summary.     Kayli Real [facility].  Medication Therapy Management Provider  [phone]

## 2017-11-08 NOTE — Clinical Note
ranjit--I spoke with geraldo --leg pains still bothersome --she is requesting higher garry dose so I hanged it and will f/up via phone with her in 30 days to see how it goes.  -lurdes

## 2017-11-20 DIAGNOSIS — I10 ESSENTIAL HYPERTENSION: ICD-10-CM

## 2017-11-20 NOTE — TELEPHONE ENCOUNTER
BP Readings from Last 3 Encounters:   09/06/17 154/62   08/11/17 116/66   07/10/17 136/82     Urea Nitrogen 16  7 - 30 mg/dL Final 06/30/2017  9:41 AM FrStHsLb   Creatinine 0.49 (L) 0.52 - 1.04 mg/dL Final 06/30/2017  9:41 AM FrStHsLb     Routing refill request to provider for review/approval because:  Labs out of range:  Creat, BP    Eleni Travis RN, BS  Clinical Nurse Triage.

## 2017-11-21 RX ORDER — CLONIDINE HYDROCHLORIDE 0.1 MG/1
TABLET ORAL
Qty: 180 TABLET | Refills: 2 | Status: SHIPPED | OUTPATIENT
Start: 2017-11-21 | End: 2018-08-07

## 2017-12-11 ENCOUNTER — ALLIED HEALTH/NURSE VISIT (OUTPATIENT)
Dept: PHARMACY | Facility: CLINIC | Age: 82
End: 2017-12-11
Payer: COMMERCIAL

## 2017-12-11 DIAGNOSIS — E55.9 VITAMIN D DEFICIENCY: ICD-10-CM

## 2017-12-11 DIAGNOSIS — N95.1 VAGINAL DRYNESS, MENOPAUSAL: ICD-10-CM

## 2017-12-11 DIAGNOSIS — I10 HYPERTENSION GOAL BP (BLOOD PRESSURE) < 140/90: ICD-10-CM

## 2017-12-11 DIAGNOSIS — Z23 ENCOUNTER FOR IMMUNIZATION: ICD-10-CM

## 2017-12-11 DIAGNOSIS — G60.9 IDIOPATHIC PERIPHERAL NEUROPATHY: Primary | ICD-10-CM

## 2017-12-11 DIAGNOSIS — I10 ESSENTIAL HYPERTENSION: ICD-10-CM

## 2017-12-11 DIAGNOSIS — F41.1 GENERALIZED ANXIETY DISORDER: ICD-10-CM

## 2017-12-11 PROCEDURE — 99606 MTMS BY PHARM EST 15 MIN: CPT | Mod: U4 | Performed by: PHARMACIST

## 2017-12-11 PROCEDURE — 99607 MTMS BY PHARM ADDL 15 MIN: CPT | Mod: U4 | Performed by: PHARMACIST

## 2017-12-11 RX ORDER — SERTRALINE HYDROCHLORIDE 100 MG/1
100 TABLET, FILM COATED ORAL DAILY
Qty: 90 TABLET | Refills: 1 | Status: SHIPPED | OUTPATIENT
Start: 2017-12-11 | End: 2018-07-03

## 2017-12-11 RX ORDER — GABAPENTIN 800 MG/1
800 TABLET ORAL 3 TIMES DAILY
Qty: 90 TABLET | Refills: 3 | Status: SHIPPED | OUTPATIENT
Start: 2017-12-11 | End: 2018-04-11

## 2017-12-11 NOTE — PROGRESS NOTES
SUBJECTIVE/OBJECTIVE:                           Daya Ignacio is a 87 year old female called for a follow-up (10-17) visit for Medication Therapy Management.  She was referred to me from GIO Soares.     She is an [facility] 2017 patient.         Chief Complaint:  She feels garry dose making pain relief better but would like a higher dose if they have it .  Also she is feeling a little of winter blues --can she get a higher dose of her happy pill ?    Lastly - she wonders if she can take nystatin /triamcinolone for vaginal dryness vs. Her estrace --are they the same ?            Personal Healthcare Goals: ambulatory--stay out of the hospital.     Allergies/ADRs: Reviewed in Epic  Tobacco: No tobacco use; quit 35 years - socially only 1 ppw. Smoked about 20 years.     Alcohol: not currently using  Caffeine: 2 cups/day of coffee  Activity: moves around   PMH: Reviewed in Epic    Medication Adherence: no issues reported    Hypertension: Current medications include Amlodipine 10mg /day-qhs now, clonidine 0.1mg bid -am and pm-, Lisinopril 40mg qday-am, metoprolol xl 25mg daily-am.  Patient does self-monitor BP.   Patient reports no current medication side effects.      OA/Neuropathy;  Garry 600mg tid --better pain relief than before --she is asking though if their is a higher dose as she still has pains .   Pins and needles pains from calves to toes.   She denies any side effects from the elvia --no dizzy or drowsiness.    denies statin drug/or smoking issues.   She did a trial off the elvia and pain worsened so she knows she needs it .   She takes tylenol /codiene at night 1 at bedtime and maybe 1 in middle night . She takes prn advil.  The drugs take the edge off her pain.       Angina:  Takes isordil 10mg tid , no sl nitro.       Depression/Anxiety:  Weeps a lot in past -- sertraline 50mg qday helps . Only gets nervous when son flys oversees --it bothers her until she hears from him.   Now here in December -2017 -- She feels she  would like a higher dose as winter depresses her /makes her blue and she is not normally like that .     Immunizations: had her 2017 high dose flu shot in September , is due for prevnar shot now at next clinic OV.      Vitamin D3: level has not been drawn.  She takes 2,000 iu's/day , she has aches/pains in calves --unknown origin.     Vaginal dryness:  She asks today if any estrace cream equivalent ?  She has friend that uses nystatin/triam cream .  But merel using for vaginal dryness 3 days week --it helps .     Current labs include:  BP Readings from Last 3 Encounters:   09/06/17 154/62   08/11/17 116/66   07/10/17 136/82     Today's Vitals: There were no vitals taken for this visit.  Lab Results   Component Value Date    A1C 5.3 01/13/2017   .  Lab Results   Component Value Date    CHOL 186 01/13/2017     Lab Results   Component Value Date    TRIG 94 01/13/2017     Lab Results   Component Value Date    HDL 57 01/13/2017     Lab Results   Component Value Date     01/13/2017       Liver Function Studies -   Recent Labs   Lab Test  06/20/17   1259   PROTTOTAL  6.9   ALBUMIN  3.5   BILITOTAL  1.5*   ALKPHOS  78   AST  11   ALT  19       Lab Results   Component Value Date    UCRR 112 08/31/2012    MICROL 16 08/31/2012    UMALCR 14.29 08/31/2012       Last Basic Metabolic Panel:  Lab Results   Component Value Date     06/30/2017      Lab Results   Component Value Date    POTASSIUM 3.9 06/30/2017     Lab Results   Component Value Date    CHLORIDE 101 06/30/2017     Lab Results   Component Value Date    BUN 16 06/30/2017     Lab Results   Component Value Date    CR 0.49 06/30/2017     GFR Estimate   Date Value Ref Range Status   06/30/2017 >90  Non  GFR Calc   >60 mL/min/1.7m2 Final   06/28/2017 >90  Non  GFR Calc   >60 mL/min/1.7m2 Final   06/24/2017 >90  Non  GFR Calc   >60 mL/min/1.7m2 Final     GFR Estimate If Black   Date Value Ref Range Status    06/30/2017 >90   GFR Calc   >60 mL/min/1.7m2 Final   06/28/2017 >90   GFR Calc   >60 mL/min/1.7m2 Final   06/24/2017 >90   GFR Calc   >60 mL/min/1.7m2 Final     TSH   Date Value Ref Range Status   08/23/2013 0.91 0.4 - 5.0 mU/L Final   ]    Most Recent Immunizations   Administered Date(s) Administered     Influenza (H1N1) 01/06/2010     Influenza (High Dose) 3 valent vaccine 09/15/2015     Pneumococcal 23 valent 09/10/2010     TD (ADULT, 7+) 09/10/2010     Zoster vaccine, live 09/06/2013   fyi--she received her 2017 high dose flu shot -September 2017!    ASSESSMENT:                             Current medications were reviewed today.       Medication Adherence: no issues identified    Hypertension: Needs Improvement. Patient is not meeting BP goal of < 150/90mmHg.  Pt would benefit from the following changes - continued BP monitoring, watching diet, increasing exercise and weight loss and sodium restriction  She is now spreading out her bp meds per mtm direction.     OA/Neuropathy: she definitely knows the garry helps but feels she needs a much higher dose --we discussed dosing options and possible se's to watch for --see plan for details.     Angina: stable     Depression/Anxiety: winter blues getting her down -- discussed higher dose zoloft trial --she agreed to try today .      Immunizations:  Had flu this year --due for prevnar -13 next clinic visit.     Vitamin D3: have lab draw next OV- reassess lab result and increase dose if needed.     Vaginal Dryness:  mtm educated patient --nyst/triam cream not a substitute for estrace , to help with cost of the cream --try using it 2 days week now.       PLAN:                            1.  FYI--Lets increase your Gabapentin dose to 800mg. Tab --1 tab 3 x day for leg pains .   2.  FYI--To help combat your winter blues --lets increase your Sertraline dose from 50mg. To 100mg. /day now.   3.  FYI-- To help with cost issues  --Try decreasing your Estrace vaginal cream from  3 days /week to 2 days /week .      Follow-up : per phone -January 8th at 11;30am       I spent 30 minutes with this patient today  . All changes were made via collaborative practice agreement with Marcy Soares. A copy of the visit note was provided to the patient's primary care provider.        The patient declined a summary of these recommendations as an after visit summary.     Kayli Real Rph.  Medication Therapy Management Provider  [phone]

## 2017-12-11 NOTE — MR AVS SNAPSHOT
After Visit Summary   12/11/2017    Daya Ignacio    MRN: [id number]           Patient Information     Date Of Birth          10/11/1930        Visit Information        Provider Department      12/11/2017 11:00 AM Kayli Real RPH [facility]        Today's Diagnoses     Idiopathic peripheral neuropathy    -  1    Generalized anxiety disorder        Hypertension goal BP (blood pressure) < 140/90        Encounter for immunization        Vitamin D deficiency        Vaginal dryness, menopausal        Essential hypertension          Care Instructions    Dear Caitlinfranklyn,     It was a pleasure talking with you today regarding your medications and medical concerns. The following is a summary of what we discussed.     1.  FYI--Lets increase your Gabapentin dose to 800mg. Tab --1 tab 3 x day for leg pains .   2.  FYI--To help combat your winter blues --lets increase your Sertraline dose from 50mg. To 100mg. /day now.   3.  FYI-- To help with cost issues --Try decreasing your Estrace vaginal cream from  3 days /week to 2 days /week .      Follow-up : per phone -January 8th at 11;30am     Thank you for your time and please feel free to call ([phone] voicemail/pager or [phone] clinic main line) or e-mail ([email or web address]) with any questions.     Kayli Real Rph.  Medication Therapy Management Provider  [phone]              Follow-ups after your visit        Your next 10 appointments already scheduled     Jan 08, 2018 11:30 AM CST   TELEMEDICINE with Kayli Real RPH   [facility] ([facility])    [address] [address] [id number] [phone]           Note: this is not an onsite visit; there is no need to come to the facility.              Who to contact     If you have questions or need follow up information about today's clinic visit or your schedule please contact [facility] directly at  "[phone].  Normal or non-critical lab and imaging results will be communicated to you by Locaihart, letter or phone within 4 business days after the clinic has received the results. If you do not hear from us within 7 days, please contact the clinic through Locaihart or phone. If you have a critical or abnormal lab result, we will notify you by phone as soon as possible.  Submit refill requests through DeRev or call your pharmacy and they will forward the refill request to us. Please allow 3 business days for your refill to be completed.          Additional Information About Your Visit        Locaihart Information     DeRev lets you send messages to your doctor, view your test results, renew your prescriptions, schedule appointments and more. To sign up, go to [email or web address] . Click on \"Log in\" on the left side of the screen, which will take you to the Welcome page. Then click on \"Sign up Now\" on the right side of the page.     You will be asked to enter the access code listed below, as well as some personal information. Please follow the directions to create your username and password.     Your access code is: [email or web address]  Expires: 3/11/2018  1:43 PM     Your access code will  in 90 days. If you need help or a new code, please call your [place] clinic or [phone].        Care EveryWhere ID     This is your Care EveryWhere ID. This could be used by other organizations to access your [place] medical records  [id number]         Blood Pressure from Last 3 Encounters:   17 154/62   17 116/66   07/10/17 136/82    Weight from Last 3 Encounters:   17 125 lb (56.7 kg)   17 127 lb (57.6 kg)   07/10/17 125 lb 6.4 oz (56.9 kg)              Today, you had the following     No orders found for display         Today's Medication Changes          These changes are accurate as of: 17  1:43 PM.  If you have any questions, ask your nurse or doctor.               These medicines " have changed or have updated prescriptions.        Dose/Directions    gabapentin 800 MG tablet   Commonly known as:  NEURONTIN   This may have changed:    - medication strength  - how much to take  - Another medication with the same name was removed. Continue taking this medication, and follow the directions you see here.   Used for:  Idiopathic peripheral neuropathy        Dose:  800 mg   Take 1 tablet (800 mg) by mouth 3 times daily   Quantity:  90 tablet   Refills:  3       sertraline 100 MG tablet   Commonly known as:  ZOLOFT   This may have changed:  See the new instructions.   Used for:  Generalized anxiety disorder        Dose:  100 mg   Take 1 tablet (100 mg) by mouth daily   Quantity:  90 tablet   Refills:  1            Where to get your medicines      These medications were sent to [facility] [address] [place] [address]  [id number] [place] [address]     Phone:  [phone]     gabapentin 800 MG tablet    sertraline 100 MG tablet                Primary Care Provider Office Phone # Fax #    Marcy Soares PA-C [phone] [address] [facility] [address]        Equal Access to Services     [facility]: Hadii piper villatoro hadasho Soomaali, waaxda luqadaha, qaybta kaalmada adeegyaosito, waxcecelia olvera . So [facility] [phone].    ATENCIÓN: Si habla español, tiene a cohen disposición servicios gratuitos de asistencia lingüística. Llame al [phone].    We comply with applicable federal civil rights laws and [place] laws. We do not discriminate on the basis of race, color, national origin, age, disability, sex, sexual orientation, or gender identity.            Thank you!     Thank you for choosing [facility]  for your care. Our goal is always to provide you with excellent care. Hearing back from our patients is one way we can continue to improve our services. Please take a few minutes to complete the  written survey that you may receive in the mail after your visit with us. Thank you!             Your Updated Medication List - Protect others around you: Learn how to safely use, store and throw away your medicines at www.disposemymeds.org.          This list is accurate as of: 12/11/17  1:43 PM.  Always use your most recent med list.                   Brand Name Dispense Instructions for use Diagnosis    acetaminophen-codeine 300-30 MG per tablet    TYLENOL WITH CODEINE #3    270 tablet    Take 1 tablet by mouth every 8 hours as needed for mild pain    Bursitis of hip, unspecified bursa, unspecified laterality       ADVIL 200 MG tablet   Generic drug:  ibuprofen      Take 200 mg by mouth every 4 hours as needed for mild pain or pain        amLODIPine 10 MG tablet    NORVASC    90 tablet    Take 1 tablet (10 mg) by mouth daily    Non-STEMI (non-ST elevated myocardial infarction) (H)       aspirin 81 MG EC tablet     30 tablet    Take 1 tablet (81 mg) by mouth daily    Non-STEMI (non-ST elevated myocardial infarction) (H)       CALCIUM 600 PO      Take 1 tablet by mouth daily        cloNIDine 0.1 MG tablet    CATAPRES    180 tablet    TAKE ONE TABLET BY MOUTH TWO TIMES A DAY    Essential hypertension       cyanocobalamin 1000 MCG tablet    vitamin  B-12     Take 1,000 mcg by mouth every morning        estradiol 0.1 MG/GM cream    ESTRACE    6 g    Place 2 g vaginally three times a week paraben-free    Symptomatic menopausal or female climacteric states       gabapentin 800 MG tablet    NEURONTIN    90 tablet    Take 1 tablet (800 mg) by mouth 3 times daily    Idiopathic peripheral neuropathy       isosorbide dinitrate 10 MG tablet    ISORDIL    270 tablet    Take 1 tablet (10 mg) by mouth 3 times daily    Non-STEMI (non-ST elevated myocardial infarction) (H)       lisinopril 40 MG tablet    PRINIVIL/ZESTRIL    90 tablet    TAKE ONE TABLET BY MOUTH ONCE DAILY    Essential hypertension       loperamide 2 MG capsule     IMODIUM    20 capsule    Take 1 capsule (2 mg) by mouth 4 times daily as needed for diarrhea    Diarrhea, unspecified type       metoprolol 25 MG 24 hr tablet    TOPROL-XL    30 tablet    Take 1 tablet (25 mg) by mouth daily    HTN (hypertension), benign       PRESERVISION AREDS Caps      Take 1 capsule by mouth 2 times daily        sertraline 100 MG tablet    ZOLOFT    90 tablet    Take 1 tablet (100 mg) by mouth daily    Generalized anxiety disorder       vitamin D3 2000 UNITS Caps      Take 2,000 Units by mouth daily (with dinner)

## 2017-12-11 NOTE — PATIENT INSTRUCTIONS
Dear Daya,     It was a pleasure talking with you today regarding your medications and medical concerns. The following is a summary of what we discussed.     1.  FYI--Lets increase your Gabapentin dose to 800mg. Tab --1 tab 3 x day for leg pains .   2.  FYI--To help combat your winter blues --lets increase your Sertraline dose from 50mg. To 100mg. /day now.   3.  FYI-- To help with cost issues --Try decreasing your Estrace vaginal cream from  3 days /week to 2 days /week .      Follow-up : per phone -January 8th at 11;30am     Thank you for your time and please feel free to call ([phone] voicemail/pager or [phone] clinic main line) or e-mail ([email or web address]) with any questions.     Kayli Real Rph.  Medication Therapy Management Provider  [phone]

## 2018-01-02 DIAGNOSIS — G89.29 CHRONIC PAIN: ICD-10-CM

## 2018-01-02 DIAGNOSIS — M70.70 BURSITIS OF HIP, UNSPECIFIED BURSA, UNSPECIFIED LATERALITY: ICD-10-CM

## 2018-01-02 NOTE — TELEPHONE ENCOUNTER
Controlled Substance Refill Request for Acetaminophen w/ Codeine  Problem List Complete:  Yes  Patient is followed by Marcy Soares PA-C, FARZA[facility] for ongoing prescription of pain medication.  All refills should only be approved by this provider, or covering partner.    Medication(s): acetaminophen-codeine (TYLENOL/CODEINE #3) 300-30 MG per tablet   Maximum quantity per month: 270  Clinic visit frequency required: Q 6  months     Controlled substance agreement:  Encounter-Level CSA:     There are no encounter-level csa.        Pain Clinic evaluation in the past: No    DIRE Total Score(s):  No flowsheet data found.    Last [facility] website verification:  done on 3/10/2017   [email or web address]     checked in past 6 months?  No, route to ROBERTA Yates, Pharmacy [facility]

## 2018-01-04 PROBLEM — G89.29 CHRONIC PAIN: Status: ACTIVE | Noted: 2017-03-10

## 2018-01-04 RX ORDER — ACETAMINOPHEN AND CODEINE PHOSPHATE 300; 30 MG/1; MG/1
1 TABLET ORAL EVERY 8 HOURS PRN
Qty: 270 TABLET | Refills: 0 | Status: SHIPPED | OUTPATIENT
Start: 2018-01-04 | End: 2018-03-23

## 2018-01-04 NOTE — TELEPHONE ENCOUNTER
RX [place] reviewed:  reviewed- no concerns    [place]  [email or web address]

## 2018-01-08 ENCOUNTER — ALLIED HEALTH/NURSE VISIT (OUTPATIENT)
Dept: PHARMACY | Facility: CLINIC | Age: 83
End: 2018-01-08
Payer: COMMERCIAL

## 2018-01-08 DIAGNOSIS — N95.1 VAGINAL DRYNESS, MENOPAUSAL: ICD-10-CM

## 2018-01-08 DIAGNOSIS — F41.1 GENERALIZED ANXIETY DISORDER: ICD-10-CM

## 2018-01-08 DIAGNOSIS — I10 HYPERTENSION GOAL BP (BLOOD PRESSURE) < 140/90: Primary | ICD-10-CM

## 2018-01-08 DIAGNOSIS — G60.9 IDIOPATHIC PERIPHERAL NEUROPATHY: ICD-10-CM

## 2018-01-08 DIAGNOSIS — Z23 ENCOUNTER FOR IMMUNIZATION: ICD-10-CM

## 2018-01-08 DIAGNOSIS — I10 HTN (HYPERTENSION), BENIGN: ICD-10-CM

## 2018-01-08 DIAGNOSIS — E55.9 VITAMIN D DEFICIENCY: ICD-10-CM

## 2018-01-08 PROCEDURE — 99607 MTMS BY PHARM ADDL 15 MIN: CPT | Performed by: PHARMACIST

## 2018-01-08 PROCEDURE — 99606 MTMS BY PHARM EST 15 MIN: CPT | Performed by: PHARMACIST

## 2018-01-08 NOTE — PROGRESS NOTES
SUBJECTIVE/OBJECTIVE:                           Mirian Ignacio is a 87 year old female called for a follow-up (12-11-17) visit for Medication Therapy Management.  She was referred to me from GIO Soares.     She is an [facility] 2017 patient.  2018?        Chief Complaint:  She is doing well with all 3 med changes from last visit. Feels like she is on the right track now.         Personal Healthcare Goals: ambulatory--stay out of the hospital.     Allergies/ADRs: Reviewed in Epic  Tobacco: No tobacco use; quit 35 years - socially only 1 ppw. Smoked about 20 years.     Alcohol: not currently using  Caffeine: 2 cups/day of coffee  Activity: moves around   PMH: Reviewed in Epic    Medication Adherence: no issues reported    Hypertension: Current medications include Amlodipine 10mg /day-qhs now, clonidine 0.1mg bid -am and pm-, Lisinopril 40mg qday-am, metoprolol xl 25mg daily-am.  Patient does self-monitor BP.   Patient reports no current medication side effects.      OA/Neuropathy;  Erlin 800mg tid --better pain relief than before --much more tolerable.     She denies any side effects from the elvia --no dizzy or drowsiness.    denies statin drug/or smoking issues.   She did a trial off the elvia and pain worsened so she knows she needs it .   She takes tylenol /codiene at night 1 at bedtime and maybe 1 in middle night . She takes prn advil.  The drugs take the edge off her pain.       Angina:  Takes isordil 10mg tid , no sl nitro.       Depression/Anxiety:  100mg. Day ssri helping her cope well with winter blues.     Immunizations: had her 2017 high dose flu shot in September , is due for prevnar shot now at next clinic OV.      Vitamin D3: level has not been drawn.  She takes 2,000 iu's/day , she has aches/pains in calves --unknown origin.     Vaginal dryness:  She asks today if any estrace cream equivalent ?  She has friend that uses nystatin/triam cream .  But mirian using for vaginal dryness 2 days week --it helps . And saves  her $$ reducing to 2 days /week.     Current labs include:  BP Readings from Last 3 Encounters:   09/06/17 154/62   08/11/17 116/66   07/10/17 136/82     Today's Vitals: There were no vitals taken for this visit.  Lab Results   Component Value Date    A1C 5.3 01/13/2017   .  Lab Results   Component Value Date    CHOL 186 01/13/2017     Lab Results   Component Value Date    TRIG 94 01/13/2017     Lab Results   Component Value Date    HDL 57 01/13/2017     Lab Results   Component Value Date     01/13/2017       Liver Function Studies -   Recent Labs   Lab Test  06/20/17   1259   PROTTOTAL  6.9   ALBUMIN  3.5   BILITOTAL  1.5*   ALKPHOS  78   AST  11   ALT  19       Lab Results   Component Value Date    UCRR 112 08/31/2012    MICROL 16 08/31/2012    UMALCR 14.29 08/31/2012       Last Basic Metabolic Panel:  Lab Results   Component Value Date     06/30/2017      Lab Results   Component Value Date    POTASSIUM 3.9 06/30/2017     Lab Results   Component Value Date    CHLORIDE 101 06/30/2017     Lab Results   Component Value Date    BUN 16 06/30/2017     Lab Results   Component Value Date    CR 0.49 06/30/2017     GFR Estimate   Date Value Ref Range Status   06/30/2017 >90  Non  GFR Calc   >60 mL/min/1.7m2 Final   06/28/2017 >90  Non  GFR Calc   >60 mL/min/1.7m2 Final   06/24/2017 >90  Non  GFR Calc   >60 mL/min/1.7m2 Final     GFR Estimate If Black   Date Value Ref Range Status   06/30/2017 >90   GFR Calc   >60 mL/min/1.7m2 Final   06/28/2017 >90   GFR Calc   >60 mL/min/1.7m2 Final   06/24/2017 >90   GFR Calc   >60 mL/min/1.7m2 Final     TSH   Date Value Ref Range Status   08/23/2013 0.91 0.4 - 5.0 mU/L Final   ]    Most Recent Immunizations   Administered Date(s) Administered     Influenza (H1N1) 01/06/2010     Influenza (High Dose) 3 valent vaccine 09/15/2015     Pneumococcal 23 valent 09/10/2010     TD (ADULT,  7+) 09/10/2010     Zoster vaccine, live 09/06/2013   fyi--she received her 2017 high dose flu shot -September 2017!    ASSESSMENT:                             Current medications were reviewed today.       Medication Adherence: no issues identified    Hypertension: Needs Improvement. Patient is not meeting BP goal of < 150/90mmHg.  Pt would benefit from the following changes - continued BP monitoring, watching diet, increasing exercise and weight loss and sodium restriction  She is now spreading out her bp meds per mtm direction.     OA/Neuropathy: stable now.     Angina: stable     Depression/Anxiety: winter blues much improved--stay on higher dose ssri.       Immunizations:  Had flu this year --due for prevnar -13 next clinic visit.     Vitamin D3: have lab draw next OV- reassess lab result and increase dose if needed.     Vaginal Dryness:  Stable .    PLAN:                              1. FYI-- All your medications are working well for you --stay on them as is , see colette sometime this winter or early spring .        Next MTM visit: Via phone-- Monday July 9th at 11:30 AM.       I spent 20 minutes with this patient today  . All changes were made via collaborative practice agreement with Colette Soares. A copy of the visit note was provided to the patient's primary care provider.        The patient declined a summary of these recommendations as an after visit summary.     Kayli Real Rph.  Medication Therapy Management Provider  [phone]

## 2018-01-08 NOTE — TELEPHONE ENCOUNTER
Requested Prescriptions   Pending Prescriptions Disp Refills     metoprolol (TOPROL-XL) 100 MG 24 hr tablet [Pharmacy Med Name: METOPROLOL SUCC ER 100MG TAB] 90 tablet 1     Sig: TAKE ONE TABLET BY MOUTH DAILY    Beta-Blockers Protocol Failed    1/8/2018  9:18 AM       Failed - Blood pressure under 140/90    BP Readings from Last 3 Encounters:   09/06/17 154/62   08/11/17 116/66   07/10/17 136/82                Passed - Patient is age 6 or older       Passed - Recent or future visit with authorizing provider's specialty    Patient had office visit in the last year or has a visit in the next 30 days with authorizing provider.  See chart review.               Last Written Prescription Date:  06/24/17  Last Fill Quantity: 30,  # refills: 0   Last Office Visit with G, P or [facility] prescribing provider:  09/06/17   Future Office Visit:

## 2018-01-08 NOTE — MR AVS SNAPSHOT
After Visit Summary   1/8/2018    Daya Ignacio    MRN: [id number]           Patient Information     Date Of Birth          10/11/1930        Visit Information        Provider Department      1/8/2018 11:30 AM Kayli Real RPH [facility]        Care Instructions    Recommendations from today's MT visit:                                                        1. FYI-- All your medications are working well for you --stay on them as is , see colette sometime this winter or early spring .        Next MTM visit: Via phone-- Monday July 9th at 11:30 AM.     To schedule another MTM appointment, please call the clinic directly or you may call the MTM scheduling line at [phone] or toll-free at [phone].     My Clinical Pharmacist's contact information:                                                      It was a pleasure seeing you today!  Please feel free to contact me with any questions or concerns you have.      Kayli Real Rph.  Medication Therapy Management Provider  [phone]      You may receive a survey about the MTM services you received.  I would appreciate your feedback to help me serve you better in the future. Please fill it out and return it when you can. Your comments will be anonymous.      My healthcare goals:                                                                      Follow-ups after your visit        Your next 10 appointments already scheduled     Jul 09, 2018 11:30 AM CDT   TELEMEDICINE with Kayli Real RPH   [facility] ([facility])    [address] [address] [id number] [phone]           Note: this is not an onsite visit; there is no need to come to the facility.              Who to contact     If you have questions or need follow up information about today's clinic visit or your schedule please contact [facility] directly at [phone].  Normal or  "non-critical lab and imaging results will be communicated to you by MyChart, letter or phone within 4 business days after the clinic has received the results. If you do not hear from us within 7 days, please contact the clinic through TRiQt or phone. If you have a critical or abnormal lab result, we will notify you by phone as soon as possible.  Submit refill requests through [employer] Media or call your pharmacy and they will forward the refill request to us. Please allow 3 business days for your refill to be completed.          Additional Information About Your Visit        [employer] Media Information     [employer] lets you send messages to your doctor, view your test results, renew your prescriptions, schedule appointments and more. To sign up, go to [email or web address] . Click on \"Log in\" on the left side of the screen, which will take you to the Welcome page. Then click on \"Sign up Now\" on the right side of the page.     You will be asked to enter the access code listed below, as well as some personal information. Please follow the directions to create your username and password.     Your access code is: [email or web address]  Expires: 3/11/2018  1:43 PM     Your access code will  in 90 days. If you need help or a new code, please call your [place] clinic or [phone].        Care EveryWhere ID     This is your Care EveryWhere ID. This could be used by other organizations to access your [place] medical records  [id number]         Blood Pressure from Last 3 Encounters:   17 154/62   17 116/66   07/10/17 136/82    Weight from Last 3 Encounters:   17 125 lb (56.7 kg)   17 127 lb (57.6 kg)   07/10/17 125 lb 6.4 oz (56.9 kg)              Today, you had the following     No orders found for display       Primary Care Provider Office Phone # Fax #    Marcy Soares PA-C [phone] [address] [facility] [address]        Equal Access to Services     MARIZOL AMOR AH: Aniket aad " shauna Holt, waaxda luqadaha, qaybta kaalmada van, kinga veenain hayaapietro monrealfede davidjoann laaldenpietro petra. So [facility] [phone].    ATENCIÓN: Si abdulkadirla anibal, tiene a cohen disposición servicios gratuitos de asistencia lingüística. Violette al [phone].    We comply with applicable federal civil rights laws and [place] laws. We do not discriminate on the basis of race, color, national origin, age, disability, sex, sexual orientation, or gender identity.            Thank you!     Thank you for choosing [facility]  for your care. Our goal is always to provide you with excellent care. Hearing back from our patients is one way we can continue to improve our services. Please take a few minutes to complete the written survey that you may receive in the mail after your visit with us. Thank you!             Your Updated Medication List - Protect others around you: Learn how to safely use, store and throw away your medicines at www.disposemymeds.org.          This list is accurate as of: 1/8/18 11:51 AM.  Always use your most recent med list.                   Brand Name Dispense Instructions for use Diagnosis    acetaminophen-codeine 300-30 MG per tablet    TYLENOL WITH CODEINE #3    270 tablet    Take 1 tablet by mouth every 8 hours as needed for mild pain    Bursitis of hip, unspecified bursa, unspecified laterality       ADVIL 200 MG tablet   Generic drug:  ibuprofen      Take 200 mg by mouth every 4 hours as needed for mild pain or pain        amLODIPine 10 MG tablet    NORVASC    90 tablet    Take 1 tablet (10 mg) by mouth daily    Non-STEMI (non-ST elevated myocardial infarction) (H)       aspirin 81 MG EC tablet     30 tablet    Take 1 tablet (81 mg) by mouth daily    Non-STEMI (non-ST elevated myocardial infarction) (H)       CALCIUM 600 PO      Take 1 tablet by mouth daily        cloNIDine 0.1 MG tablet    CATAPRES    180 tablet    TAKE ONE TABLET BY MOUTH TWO TIMES A DAY    Essential  hypertension       cyanocobalamin 1000 MCG tablet    vitamin  B-12     Take 1,000 mcg by mouth every morning        estradiol 0.1 MG/GM cream    ESTRACE    6 g    Place 2 g vaginally three times a week paraben-free    Symptomatic menopausal or female climacteric states       gabapentin 800 MG tablet    NEURONTIN    90 tablet    Take 1 tablet (800 mg) by mouth 3 times daily    Idiopathic peripheral neuropathy       isosorbide dinitrate 10 MG tablet    ISORDIL    270 tablet    Take 1 tablet (10 mg) by mouth 3 times daily    Non-STEMI (non-ST elevated myocardial infarction) (H)       lisinopril 40 MG tablet    PRINIVIL/ZESTRIL    90 tablet    TAKE ONE TABLET BY MOUTH ONCE DAILY    Essential hypertension       loperamide 2 MG capsule    IMODIUM    20 capsule    Take 1 capsule (2 mg) by mouth 4 times daily as needed for diarrhea    Diarrhea, unspecified type       metoprolol 25 MG 24 hr tablet    TOPROL-XL    30 tablet    Take 1 tablet (25 mg) by mouth daily    HTN (hypertension), benign       PRESERVISION AREDS Caps      Take 1 capsule by mouth 2 times daily        sertraline 100 MG tablet    ZOLOFT    90 tablet    Take 1 tablet (100 mg) by mouth daily    Generalized anxiety disorder       vitamin D3 2000 UNITS Caps      Take 2,000 Units by mouth daily (with dinner)

## 2018-01-08 NOTE — PATIENT INSTRUCTIONS
Recommendations from today's MTM visit:                                                        1. FYI-- All your medications are working well for you --stay on them as is , see colette sometime this winter or early spring .        Next MTM visit: Via phone-- Monday July 9th at 11:30 AM.     To schedule another MTM appointment, please call the clinic directly or you may call the MTM scheduling line at [phone] or toll-free at [phone].     My Clinical Pharmacist's contact information:                                                      It was a pleasure seeing you today!  Please feel free to contact me with any questions or concerns you have.      Kayli Real [facility].  Medication Therapy Management Provider  [phone]      You may receive a survey about the MTM services you received.  I would appreciate your feedback to help me serve you better in the future. Please fill it out and return it when you can. Your comments will be anonymous.      My healthcare goals:

## 2018-01-09 RX ORDER — METOPROLOL SUCCINATE 100 MG/1
TABLET, EXTENDED RELEASE ORAL
Qty: 90 TABLET | Refills: 1 | Status: SHIPPED | OUTPATIENT
Start: 2018-01-09 | End: 2018-07-03

## 2018-01-09 NOTE — TELEPHONE ENCOUNTER
Routing refill request to provider for review/approval because:  Appears pt had dosing change on Metoprolol not by PCP-  This was filled for 30 day fill in June and not since.     Please advise as to fill.    LM for pt to call back -  Notes from [place] state she is taking this daily.     Amairani Atkinson, RN

## 2018-01-10 ENCOUNTER — TELEPHONE (OUTPATIENT)
Dept: FAMILY MEDICINE | Facility: CLINIC | Age: 83
End: 2018-01-10

## 2018-01-26 ENCOUNTER — TELEPHONE (OUTPATIENT)
Dept: FAMILY MEDICINE | Facility: CLINIC | Age: 83
End: 2018-01-26

## 2018-01-26 DIAGNOSIS — E78.5 HYPERLIPIDEMIA LDL GOAL <130: Primary | ICD-10-CM

## 2018-01-26 DIAGNOSIS — I10 HYPERTENSION GOAL BP (BLOOD PRESSURE) < 140/90: ICD-10-CM

## 2018-01-26 NOTE — TELEPHONE ENCOUNTER
Pt calling to schedule pneumonia shot and lab work. She thought that Marcy wanted her to come in for labs, no recent orders.     Pls call pt when labs ordered to schedule, she would like to have her shot and labs at the same appointment. [phone]    Diallo [place]   01/26/18 4:31 PM

## 2018-01-31 NOTE — TELEPHONE ENCOUNTER
Please call pt back.    Orders placed for labs. Needs to be fasting for labs.    Patient will need lab and nurse visits.

## 2018-02-01 NOTE — TELEPHONE ENCOUNTER
Tried calling patient no answer.  After chart review patient already has lab and nurse only apt scheduled.

## 2018-02-05 ENCOUNTER — OFFICE VISIT (OUTPATIENT)
Dept: FAMILY MEDICINE | Facility: CLINIC | Age: 83
End: 2018-02-05
Payer: MEDICARE

## 2018-02-05 VITALS
BODY MASS INDEX: 22.5 KG/M2 | RESPIRATION RATE: 16 BRPM | TEMPERATURE: 98.1 F | DIASTOLIC BLOOD PRESSURE: 62 MMHG | HEIGHT: 63 IN | HEART RATE: 54 BPM | WEIGHT: 127 LBS | SYSTOLIC BLOOD PRESSURE: 146 MMHG | OXYGEN SATURATION: 96 %

## 2018-02-05 DIAGNOSIS — S19.9XXA HEAD, FACE & NECK INJURY, INITIAL ENCOUNTER: ICD-10-CM

## 2018-02-05 DIAGNOSIS — S79.912A INJURY OF LEFT HIP, INITIAL ENCOUNTER: ICD-10-CM

## 2018-02-05 DIAGNOSIS — E78.5 HYPERLIPIDEMIA LDL GOAL <130: ICD-10-CM

## 2018-02-05 DIAGNOSIS — S09.93XA HEAD, FACE & NECK INJURY, INITIAL ENCOUNTER: ICD-10-CM

## 2018-02-05 DIAGNOSIS — Z23 PNEUMOCOCCAL VACCINATION GIVEN: ICD-10-CM

## 2018-02-05 DIAGNOSIS — S09.90XA HEAD, FACE & NECK INJURY, INITIAL ENCOUNTER: ICD-10-CM
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
## 2018-02-05 DIAGNOSIS — I10 HYPERTENSION GOAL BP (BLOOD PRESSURE) < 140/90: ICD-10-CM

## 2018-02-05 LAB
ALBUMIN SERPL-MCNC: 3.9 G/DL (ref 3.4–5)
ALP SERPL-CCNC: 75 U/L (ref 40–150)
ALT SERPL W P-5'-P-CCNC: 17 U/L (ref 0–50)
ANION GAP SERPL CALCULATED.3IONS-SCNC: 8 MMOL/L (ref 3–14)
AST SERPL W P-5'-P-CCNC: 17 U/L (ref 0–45)
BILIRUB SERPL-MCNC: 1 MG/DL (ref 0.2–1.3)
BUN SERPL-MCNC: 18 MG/DL (ref 7–30)
CALCIUM SERPL-MCNC: 9.5 MG/DL (ref 8.5–10.1)
CHLORIDE SERPL-SCNC: 104 MMOL/L (ref 94–109)
CHOLEST SERPL-MCNC: 144 MG/DL
CO2 SERPL-SCNC: 29 MMOL/L (ref 20–32)
CREAT SERPL-MCNC: 0.7 MG/DL (ref 0.52–1.04)
GFR SERPL CREATININE-BSD FRML MDRD: 79 ML/MIN/1.7M2
GLUCOSE SERPL-MCNC: 88 MG/DL (ref 70–99)
HDLC SERPL-MCNC: 50 MG/DL
LDLC SERPL CALC-MCNC: 76 MG/DL
NONHDLC SERPL-MCNC: 94 MG/DL
POTASSIUM SERPL-SCNC: 4.1 MMOL/L (ref 3.4–5.3)
PROT SERPL-MCNC: 7.3 G/DL (ref 6.8–8.8)
SODIUM SERPL-SCNC: 141 MMOL/L (ref 133–144)
TRIGL SERPL-MCNC: 92 MG/DL

## 2018-02-05 PROCEDURE — 36415 COLL VENOUS BLD VENIPUNCTURE: CPT | Performed by: PHYSICIAN ASSISTANT

## 2018-02-05 PROCEDURE — G0009 ADMIN PNEUMOCOCCAL VACCINE: HCPCS | Performed by: FAMILY MEDICINE

## 2018-02-05 PROCEDURE — 80053 COMPREHEN METABOLIC PANEL: CPT | Performed by: PHYSICIAN ASSISTANT

## 2018-02-05 PROCEDURE — 90670 PCV13 VACCINE IM: CPT | Performed by: FAMILY MEDICINE

## 2018-02-05 PROCEDURE — 80061 LIPID PANEL: CPT | Performed by: PHYSICIAN ASSISTANT

## 2018-02-05 PROCEDURE — 99214 OFFICE O/P EST MOD 30 MIN: CPT | Mod: 25 | Performed by: FAMILY MEDICINE

## 2018-02-05 ASSESSMENT — ANXIETY QUESTIONNAIRES
IF YOU CHECKED OFF ANY PROBLEMS ON THIS QUESTIONNAIRE, HOW DIFFICULT HAVE THESE PROBLEMS MADE IT FOR YOU TO DO YOUR WORK, TAKE CARE OF THINGS AT HOME, OR GET ALONG WITH OTHER PEOPLE: NOT DIFFICULT AT ALL
2. NOT BEING ABLE TO STOP OR CONTROL WORRYING: NOT AT ALL
7. FEELING AFRAID AS IF SOMETHING AWFUL MIGHT HAPPEN: NOT AT ALL
3. WORRYING TOO MUCH ABOUT DIFFERENT THINGS: NOT AT ALL
GAD7 TOTAL SCORE: 0
1. FEELING NERVOUS, ANXIOUS, OR ON EDGE: NOT AT ALL
6. BECOMING EASILY ANNOYED OR IRRITABLE: NOT AT ALL
5. BEING SO RESTLESS THAT IT IS HARD TO SIT STILL: NOT AT ALL

## 2018-02-05 ASSESSMENT — PATIENT HEALTH QUESTIONNAIRE - PHQ9: 5. POOR APPETITE OR OVEREATING: NOT AT ALL

## 2018-02-05 NOTE — LETTER
February 6, 2018      Daya Ignacio  [address]   [place] [address]        Dear ,    We are writing to inform you of your test results.    Your electrolytes, blood sugar, kidney function and liver enzymes were normal.   Cholesterol looks great as well.     Resulted Orders   Comprehensive metabolic panel   Result Value Ref Range    Sodium 141 133 - 144 mmol/L    Potassium 4.1 3.4 - 5.3 mmol/L    Chloride 104 94 - 109 mmol/L    Carbon Dioxide 29 20 - 32 mmol/L    Anion Gap 8 3 - 14 mmol/L    Glucose 88 70 - 99 mg/dL    Urea Nitrogen 18 7 - 30 mg/dL    Creatinine 0.70 0.52 - 1.04 mg/dL    GFR Estimate 79 >60 mL/min/1.7m2      Comment:      Non  GFR Calc    GFR Estimate If Black >90 >60 mL/min/1.7m2      Comment:       GFR Calc    Calcium 9.5 8.5 - 10.1 mg/dL    Bilirubin Total 1.0 0.2 - 1.3 mg/dL    Albumin 3.9 3.4 - 5.0 g/dL    Protein Total 7.3 6.8 - 8.8 g/dL    Alkaline Phosphatase 75 40 - 150 U/L    ALT 17 0 - 50 U/L    AST 17 0 - 45 U/L   Lipid panel reflex to direct LDL Fasting   Result Value Ref Range    Cholesterol 144 <200 mg/dL    Triglycerides 92 <150 mg/dL    HDL Cholesterol 50 >49 mg/dL    LDL Cholesterol Calculated 76 <100 mg/dL      Comment:      Desirable:       <100 mg/dl    Non HDL Cholesterol 94 <130 mg/dL       If you have any questions or concerns, please call the clinic at the number listed above.       Sincerely,        Dee Dee Omer MD

## 2018-02-05 NOTE — MR AVS SNAPSHOT
"              After Visit Summary   2/5/2018    Daya Ignacio    MRN: [id number]           Patient Information     Date Of Birth          10/11/1930        Visit Information        Provider Department      2/5/2018 11:00 AM Dee Dee Omer MD [facility]        Today's Diagnoses     Fall, initial encounter    -  1    Injury of left hip, initial encounter        Head, face & neck injury, initial encounter        Hypertension goal BP (blood pressure) < 140/90        Hyperlipidemia LDL goal <130        Pneumococcal vaccination given           Follow-ups after your visit        Your next 10 appointments already scheduled     Jul 09, 2018 11:30 AM CDT   TELEMEDICINE with Kayli Real [facility] [place] ([facility])    [address] [address] [phone]           Note: this is not an onsite visit; there is no need to come to the facility.              Who to contact     If you have questions or need follow up information about today's clinic visit or your schedule please contact [facility] directly at [phone].  Normal or non-critical lab and imaging results will be communicated to you by Workboardhart, letter or phone within 4 business days after the clinic has received the results. If you do not hear from us within 7 days, please contact the clinic through Workboardhart or phone. If you have a critical or abnormal lab result, we will notify you by phone as soon as possible.  Submit refill requests through Architurn or call your pharmacy and they will forward the refill request to us. Please allow 3 business days for your refill to be completed.          Additional Information About Your Visit        MyChart Information     Architurn lets you send messages to your doctor, view your test results, renew your prescriptions, schedule appointments and more. To sign up, go to [email or web address] . Click on \"Log in\" on the left side of " "the screen, which will take you to the Welcome page. Then click on \"Sign up Now\" on the right side of the page.     You will be asked to enter the access code listed below, as well as some personal information. Please follow the directions to create your username and password.     Your access code is: [email or web address]  Expires: 3/11/2018  1:43 PM     Your access code will  in 90 days. If you need help or a new code, please call your [place] clinic or [phone].        Care EveryWhere ID     This is your Care EveryWhere ID. This could be used by other organizations to access your [place] medical records  [id number]        Your Vitals Were     Pulse Temperature Respirations Height Pulse Oximetry BMI (Body Mass Index)    54 98.1  F (36.7  C) (Oral) 16 5' 3\" (1.6 m) 96% 22.5 kg/m2       Blood Pressure from Last 3 Encounters:   18 146/62   17 154/62   17 116/66    Weight from Last 3 Encounters:   18 127 lb (57.6 kg)   17 125 lb (56.7 kg)   17 127 lb (57.6 kg)              We Performed the Following     Comprehensive metabolic panel     Lipid panel reflex to direct LDL Fasting     PNEUMOCOCCAL CONJ VACCINE 13 VALENT IM        Primary Care Provider Office Phone # Fax #    Marcy Soares PA-C [phone] [address] [address] [address]        Equal Access to Services     [facility] HospitalBERNARDO : Hadii aad ku hadasho Soomaali, waaxda luqadaha, qaybta kaalmada adeegyada, kinga olvera . So [facility] [phone].    ATENCIÓN: Si habla español, tiene a cohen disposición servicios gratuitos de asistencia lingüística. Llame al [phone].    We comply with applicable federal civil rights laws and [place] laws. We do not discriminate on the basis of race, color, national origin, age, disability, sex, sexual orientation, or gender identity.            Thank you!     Thank you for choosing [facility]  for your care. Our goal is " always to provide you with excellent care. Hearing back from our patients is one way we can continue to improve our services. Please take a few minutes to complete the written survey that you may receive in the mail after your visit with us. Thank you!             Your Updated Medication List - Protect others around you: Learn how to safely use, store and throw away your medicines at www.disposemymeds.org.          This list is accurate as of 2/5/18  3:05 PM.  Always use your most recent med list.                   Brand Name Dispense Instructions for use Diagnosis    acetaminophen-codeine 300-30 MG per tablet    TYLENOL WITH CODEINE #3    270 tablet    Take 1 tablet by mouth every 8 hours as needed for mild pain    Bursitis of hip, unspecified bursa, unspecified laterality       ADVIL 200 MG tablet   Generic drug:  ibuprofen      Take 200 mg by mouth every 4 hours as needed for mild pain or pain        amLODIPine 10 MG tablet    NORVASC    90 tablet    Take 1 tablet (10 mg) by mouth daily    Non-STEMI (non-ST elevated myocardial infarction) (H)       aspirin 81 MG EC tablet     30 tablet    Take 1 tablet (81 mg) by mouth daily    Non-STEMI (non-ST elevated myocardial infarction) (H)       CALCIUM 600 PO      Take 1 tablet by mouth daily        cloNIDine 0.1 MG tablet    CATAPRES    180 tablet    TAKE ONE TABLET BY MOUTH TWO TIMES A DAY    Essential hypertension       cyanocobalamin 1000 MCG tablet    vitamin  B-12     Take 1,000 mcg by mouth every morning        estradiol 0.1 MG/GM cream    ESTRACE    6 g    Place 2 g vaginally three times a week paraben-free    Symptomatic menopausal or female climacteric states       gabapentin 800 MG tablet    NEURONTIN    90 tablet    Take 1 tablet (800 mg) by mouth 3 times daily    Idiopathic peripheral neuropathy       isosorbide dinitrate 10 MG tablet    ISORDIL    270 tablet    Take 1 tablet (10 mg) by mouth 3 times daily    Non-STEMI (non-ST elevated myocardial infarction)  (H)       lisinopril 40 MG tablet    PRINIVIL/ZESTRIL    90 tablet    TAKE ONE TABLET BY MOUTH ONCE DAILY    Essential hypertension       loperamide 2 MG capsule    IMODIUM    20 capsule    Take 1 capsule (2 mg) by mouth 4 times daily as needed for diarrhea    Diarrhea, unspecified type       * metoprolol succinate 25 MG 24 hr tablet    TOPROL-XL    30 tablet    Take 1 tablet (25 mg) by mouth daily    HTN (hypertension), benign       * metoprolol succinate 100 MG 24 hr tablet    TOPROL-XL    90 tablet    TAKE ONE TABLET BY MOUTH DAILY    HTN (hypertension), benign       PRESERVISION AREDS Caps      Take 1 capsule by mouth 2 times daily        sertraline 100 MG tablet    ZOLOFT    90 tablet    Take 1 tablet (100 mg) by mouth daily    Generalized anxiety disorder       vitamin D3 2000 UNITS Caps      Take 2,000 Units by mouth daily (with dinner)        * Notice:  This list has 2 medication(s) that are the same as other medications prescribed for you. Read the directions carefully, and ask your doctor or other care provider to review them with you.

## 2018-02-05 NOTE — PROGRESS NOTES
SUBJECTIVE:   Daya Ignacio is a 87 year old female who presents to clinic today for the following health issues:      Concern - fell at home  Onset: this morning    Description:   Injured left hip and side of face    Intensity: moderate    Progression of Symptoms:  As she was going out of the garage she slipped on icy spot and landed on her Rt side of the face, and Lt hip.     Accompanying Signs & Symptoms:  Bruised and scratches  Pt did not pass out, and was able to stand up on her own and walk.  Pt did not hit her head, and noticed minimal bleeding from the face, but no bruise on the face or hip.        Problem list and histories reviewed & adjusted, as indicated.  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     Hypertension goal BP (blood pressure) < 140/90     Chronic pain     NSTEMI (non-ST elevated myocardial infarction) (H)     Urinary tract infection without hematuria     Hip pain     Trochanteric fracture of left femur (H)     Diarrhea     Past Surgical History:   Procedure Laterality Date     C NONSPECIFIC PROCEDURE      lt hip pain     ORTHOPEDIC SURGERY         Social History   Substance Use Topics     Smoking status: Never Smoker     Smokeless tobacco: Never Used     Alcohol use No     Family History   Problem Relation Age of Onset     Alzheimer Disease Mother      mild     Cardiovascular Father      heart attack     Neurologic Disorder Brother 83     Parkinson's         Current Outpatient Prescriptions   Medication Sig Dispense Refill     metoprolol (TOPROL-XL) 100 MG 24 hr tablet TAKE ONE TABLET BY MOUTH DAILY 90 tablet 1     acetaminophen-codeine (TYLENOL WITH CODEINE #3) 300-30 MG per tablet Take 1 tablet by  mouth every 8 hours as needed for mild pain 270 tablet 0     gabapentin (NEURONTIN) 800 MG tablet Take 1 tablet (800 mg) by mouth 3 times daily 90 tablet 3     sertraline (ZOLOFT) 100 MG tablet Take 1 tablet (100 mg) by mouth daily 90 tablet 1     cloNIDine (CATAPRES) 0.1 MG tablet TAKE ONE TABLET BY MOUTH TWO TIMES A  tablet 2     isosorbide dinitrate (ISORDIL) 10 MG tablet Take 1 tablet (10 mg) by mouth 3 times daily 270 tablet 3     amLODIPine (NORVASC) 10 MG tablet Take 1 tablet (10 mg) by mouth daily 90 tablet 3     ibuprofen (ADVIL) 200 MG tablet Take 200 mg by mouth every 4 hours as needed for mild pain or pain       Calcium Carbonate (CALCIUM 600 PO) Take 1 tablet by mouth daily       lisinopril (PRINIVIL/ZESTRIL) 40 MG tablet TAKE ONE TABLET BY MOUTH ONCE DAILY 90 tablet 1     loperamide (IMODIUM) 2 MG capsule Take 1 capsule (2 mg) by mouth 4 times daily as needed for diarrhea 20 capsule      metoprolol (TOPROL-XL) 25 MG 24 hr tablet Take 1 tablet (25 mg) by mouth daily 30 tablet 0     estradiol (ESTRACE) 0.1 MG/GM cream Place 2 g vaginally three times a week paraben-free 6 g 1     Cholecalciferol (VITAMIN D3) 2000 UNITS CAPS Take 2,000 Units by mouth daily (with dinner)       Multiple Vitamins-Minerals (PRESERVISION AREDS) CAPS Take 1 capsule by mouth 2 times daily       cyanocobalamin (VITAMIN  B-12) 1000 MCG tablet Take 1,000 mcg by mouth every morning       aspirin EC 81 MG EC tablet Take 1 tablet (81 mg) by mouth daily 30 tablet 3     Allergies   Allergen Reactions     Atorvastatin Muscle Pain (Myalgia)     Macrobid [Nitrofurantoin Anhydrous] Other (See Comments)     Body aches     Sulfa Drugs        Reviewed and updated as needed this visit by clinical staff  Tobacco  Allergies  Meds  Med Hx  Surg Hx  Fam Hx       Reviewed and updated as needed this visit by Provider         ROS:  C: NEGATIVE for fever, chills, change in weight    NEURO: NEGATIVE for weakness, dizziness or  "paresthesias    OBJECTIVE:     /62 (BP Location: Right arm, Patient Position: Chair, Cuff Size: Adult Regular)  Pulse 54  Temp 98.1  F (36.7  C) (Oral)  Resp 16  Ht 5' 3\" (1.6 m)  Wt 127 lb (57.6 kg)  SpO2 96%  BMI 22.5 kg/m2  Body mass index is 22.5 kg/(m^2).  GENERAL: healthy, alert and no distress  EYES: Eyes grossly normal to inspection, PERRL and conjunctivae and sclerae normal  HENT: ear canals and TM's normal, nose and mouth without ulcers or lesions  MS: Hip exam :   Gait :mild limp on the Lt leg ( no trendelenburg gait), no leg length discrepancy   ROM : internal rotation limited  external rotation limited (no change from previous)  BENITEZ test no painc FADIR test limited but no pain  Rolling test negative  Silvio test not done  SI joint compression negative  Maureen's sign (Piriformis muscle) not done  Palpation : no tenderness on the trochanteric bursa,no palpation of the SI joint, no joint tenderness.  Motor :normal muscle strength in the lower extremities.  Sensation : intact.    SKIN: small 3 mm wound on the face, on lateral part of the eyebrow, with smaller pinpoint wound on the Rt nose         ASSESSMENT/PLAN:             1. Fall, initial encounter  Pt is doing well, walking with minimal pain.    2. Injury of left hip, initial encounter  Mostly contusion, advised about rest and ice.  Follow up in 7 days if symptoms persist, sooner if symptoms worsen or new ones develops, pt may contact us over the phone for any questions or concerns.    3.face injury, initial encounter  With 2 small wound, dressed.    4. Hypertension goal BP (blood pressure) < 140/90    - Lipid panel reflex to direct LDL Fasting    5. Hyperlipidemia LDL goal <130    - Comprehensive metabolic panel    6. Pneumococcal vaccination given    - PNEUMOCOCCAL CONJ VACCINE 13 VALENT IM        Dee Dee Omer MD  [facility]"

## 2018-02-06 ASSESSMENT — PATIENT HEALTH QUESTIONNAIRE - PHQ9: SUM OF ALL RESPONSES TO PHQ QUESTIONS 1-9: 0

## 2018-02-06 ASSESSMENT — ANXIETY QUESTIONNAIRES: GAD7 TOTAL SCORE: 0

## 2018-03-07 DIAGNOSIS — I10 ESSENTIAL HYPERTENSION: ICD-10-CM

## 2018-03-07 RX ORDER — LISINOPRIL 40 MG/1
TABLET ORAL
Qty: 90 TABLET | Refills: 1 | Status: SHIPPED | OUTPATIENT
Start: 2018-03-07 | End: 2018-09-07

## 2018-03-07 NOTE — TELEPHONE ENCOUNTER
"Daya Vidal's BP high last 2 visits. Refills OK or does she need a visit?     Last Written Prescription Date:  7/14/17  Last Fill Quantity: 90,  # refills: 1   Last office visit: 2/5/2018 with prescribing provider:  Dr. Omer   Future Office Visit:  --  Requested Prescriptions   Pending Prescriptions Disp Refills     lisinopril (PRINIVIL/ZESTRIL) 40 MG tablet [Pharmacy Med Name: LISINOPRIL 40MG TABS] 90 tablet 1     Sig: TAKE ONE TABLET BY MOUTH ONCE DAILY    ACE Inhibitors (Including Combos) Protocol Failed    3/7/2018 10:19 AM       Failed - Blood pressure under 140/90 in past 12 months    BP Readings from Last 3 Encounters:   02/05/18 146/62   09/06/17 154/62   08/11/17 116/66                Passed - Recent (12 mo) or future (30 days) visit within the authorizing provider's specialty    Patient had office visit in the last year or has a visit in the next 30 days with authorizing provider.  See \"Patient Info\" tab in inbasket, or \"Choose Columns\" in Meds & Orders section of the refill encounter.            Passed - Patient is age 18 or older       Passed - No active pregnancy on record       Passed - Normal serum creatinine on file in past 12 months    Recent Labs   Lab Test  02/05/18   1145   CR  0.70            Passed - Normal serum potassium on file in past 12 months    Recent Labs   Lab Test  02/05/18   1145   POTASSIUM  4.1            Passed - No positive pregnancy test in past 12 months        Juliano Pena RN      "

## 2018-03-23 DIAGNOSIS — M70.70 BURSITIS OF HIP, UNSPECIFIED BURSA, UNSPECIFIED LATERALITY: ICD-10-CM

## 2018-03-23 RX ORDER — ACETAMINOPHEN AND CODEINE PHOSPHATE 300; 30 MG/1; MG/1
1 TABLET ORAL EVERY 8 HOURS PRN
Qty: 270 TABLET | Refills: 0 | Status: SHIPPED | OUTPATIENT
Start: 2018-03-23 | End: 2018-07-07

## 2018-03-23 NOTE — TELEPHONE ENCOUNTER
Controlled Substance Refill Request for tylenol with codeiene  Problem List Complete:  Yes  Patient is followed by Marcy Soares PA-C, FARZANA for ongoing prescription of pain medication.  All refills should only be approved by this provider, or covering partner.    Medication(s): acetaminophen-codeine (TYLENOL/CODEINE #3) 300-30 MG per tablet   Maximum quantity per month: 270  Clinic visit frequency required: Q 6  months     Controlled substance agreement:  Encounter-Level CSA:     There are no encounter-level csa.        Pain Clinic evaluation in the past: No    DIRE Total Score(s):  No flowsheet data found.    Last [facility] website verification:  done on 1/4/2018   [email or web address]   checked in past 6 months?  Yes 01/04/2018     Please walk signed prescription over to pharmacy.  Thanks!  Alberta Mariscal CPhT  [facility]  [phone]

## 2018-04-11 DIAGNOSIS — G60.9 IDIOPATHIC PERIPHERAL NEUROPATHY: ICD-10-CM

## 2018-04-11 NOTE — TELEPHONE ENCOUNTER
Requested Prescriptions   Pending Prescriptions Disp Refills     gabapentin (NEURONTIN) 800 MG tablet [Pharmacy Med Name: GABAPENTIN 800MG TABS]  Last Written Prescription Date:  12/11/2017  Last Fill Quantity: 90 tablet,  # refills: 3   Last office visit: 1/8/2018 with prescribing provider:      Kayli Real RPH        Future Office Visit:     90 tablet 3     Sig: TAKE ONE TABLET BY MOUTH THREE TIMES A DAY    There is no refill protocol information for this order   :     Routing refill request to provider for review/approval because:  Drug not on the G, P or [facility] refill protocol or controlled substance

## 2018-04-11 NOTE — TELEPHONE ENCOUNTER
Pending Prescriptions:                       Disp   Refills    gabapentin (NEURONTIN) 800 MG tablet [Pha*90 tab*3            Sig: TAKE ONE TABLET BY MOUTH THREE TIMES A DAY

## 2018-04-12 RX ORDER — GABAPENTIN 800 MG/1
TABLET ORAL
Qty: 270 TABLET | Refills: 1 | Status: SHIPPED | OUTPATIENT
Start: 2018-04-12 | End: 2018-09-07

## 2018-06-07 ENCOUNTER — TRANSFERRED RECORDS (OUTPATIENT)
Dept: HEALTH INFORMATION MANAGEMENT | Facility: CLINIC | Age: 83
End: 2018-06-07

## 2018-07-03 DIAGNOSIS — F41.1 GENERALIZED ANXIETY DISORDER: ICD-10-CM

## 2018-07-03 DIAGNOSIS — I10 HTN (HYPERTENSION), BENIGN: ICD-10-CM

## 2018-07-03 NOTE — TELEPHONE ENCOUNTER
"Requested Prescriptions   Pending Prescriptions Disp Refills     sertraline (ZOLOFT) 100 MG tablet [Pharmacy Med Name: SERTRALINE HCL 100MG TABS] 90 tablet 1     Sig: TAKE ONE TABLET BY MOUTH EVERY DAY    SSRIs Protocol Passed    7/3/2018 12:06 PM       Passed - Recent (12 mo) or future (30 days) visit within the authorizing provider's specialty    Patient had office visit in the last 12 months or has a visit in the next 30 days with authorizing provider or within the authorizing provider's specialty.  See \"Patient Info\" tab in inbasket, or \"Choose Columns\" in Meds & Orders section of the refill encounter.           Passed - Patient is age 18 or older       Passed - No active pregnancy on record       Passed - No positive pregnancy test in last 12 months     Last Written Prescription Date:  12/11/17  Last Fill Quantity: 90 tablet,  # refills: 1   Last office visit: 1/8/2018 with prescribing provider:  Addie   Future Office Visit:         metoprolol succinate (TOPROL-XL) 100 MG 24 hr tablet [Pharmacy Med Name: METOPROLOL SUCC ER 100MG TAB] 90 tablet 1     Sig: TAKE ONE TABLET BY MOUTH DAILY    Beta-Blockers Protocol Failed    7/3/2018 12:06 PM       Failed - Blood pressure under 140/90 in past 12 months    BP Readings from Last 3 Encounters:   02/05/18 146/62   09/06/17 154/62   08/11/17 116/66                Passed - Patient is age 6 or older       Passed - Recent (12 mo) or future (30 days) visit within the authorizing provider's specialty    Patient had office visit in the last 12 months or has a visit in the next 30 days with authorizing provider or within the authorizing provider's specialty.  See \"Patient Info\" tab in inbasket, or \"Choose Columns\" in Meds & Orders section of the refill encounter.         Last Written Prescription Date:  1/09/18  Last Fill Quantity: 90 tablet,  # refills: 1   Last office visit: 9/6/2017 with prescribing provider:  Rodger   Future Office Visit:           PHQ-9 SCORE 1/22/2016 " 1/13/2017 2/5/2018   Total Score - - -   Total Score 4 2 0     LISSA-7 SCORE 1/22/2016 1/13/2017 2/5/2018   Total Score - - -   Total Score 0 0 0

## 2018-07-05 ENCOUNTER — TELEPHONE (OUTPATIENT)
Dept: FAMILY MEDICINE | Facility: CLINIC | Age: 83
End: 2018-07-05

## 2018-07-05 DIAGNOSIS — Z12.31 ENCOUNTER FOR SCREENING MAMMOGRAM FOR BREAST CANCER: Primary | ICD-10-CM

## 2018-07-05 DIAGNOSIS — N95.1 SYMPTOMATIC MENOPAUSAL OR FEMALE CLIMACTERIC STATES: ICD-10-CM

## 2018-07-05 RX ORDER — METOPROLOL SUCCINATE 100 MG/1
TABLET, EXTENDED RELEASE ORAL
Qty: 90 TABLET | Refills: 0 | Status: SHIPPED | OUTPATIENT
Start: 2018-07-05 | End: 2018-09-07

## 2018-07-05 RX ORDER — ESTRADIOL 0.1 MG/G
2 CREAM VAGINAL
Qty: 6 G | Refills: 1 | Status: SHIPPED | OUTPATIENT
Start: 2018-07-06 | End: 2018-09-07

## 2018-07-05 RX ORDER — SERTRALINE HYDROCHLORIDE 100 MG/1
TABLET, FILM COATED ORAL
Qty: 90 TABLET | Refills: 0 | Status: SHIPPED | OUTPATIENT
Start: 2018-07-05 | End: 2018-09-07

## 2018-07-05 NOTE — TELEPHONE ENCOUNTER
FV-Estradiol 0.01% (paraben free) Vanicream (compounded)       Last Written Prescription Date: 6/21/17  Last Fill Quantity: 6 grams,   # refills: 1  Last Office Visit: 9/6/2017  Future Office visit: NONE Scheduled    Routing refill request to provider for review/approval because:  Compounded medication

## 2018-07-05 NOTE — TELEPHONE ENCOUNTER
"Last Written Prescription Date:  6.22.17  Last Fill Quantity: 6 GM,  # refills: 1   Last office visit: 2/5/2018 with prescribing provider:  Kan   Future Office Visit:      Requested Prescriptions   Signed Prescriptions Disp Refills     estradiol (ESTRACE) 0.1 MG/GM cream 6 g 1     Sig: Place 2 g vaginally three times a week paraben-free    Hormone Replacement Therapy Failed    7/5/2018  9:37 AM       Failed - Blood pressure under 140/90 in past 12 months    BP Readings from Last 3 Encounters:   02/05/18 146/62   09/06/17 154/62   08/11/17 116/66                Passed - Recent (12 mo) or future (30 days) visit within the authorizing provider's specialty    Patient had office visit in the last 12 months or has a visit in the next 30 days with authorizing provider or within the authorizing provider's specialty.  See \"Patient Info\" tab in inbasket, or \"Choose Columns\" in Meds & Orders section of the refill encounter.           Passed - Patient is 18 years of age or older       Passed - No active pregnancy on record       Passed - No positive pregnancy test on record in past 12 months        Contacted pt,   Transferred to schedule mammogram    Eleni Travis RN, BS  Clinical Nurse Triage.    "

## 2018-07-05 NOTE — TELEPHONE ENCOUNTER
Rx sent.    Please call. It is recommended that women using hormone cream, be screened for breast cancer by mammography.    I am recommending a mammogram for pt.    Orders have been placed and she can call [phone] for this.

## 2018-07-05 NOTE — TELEPHONE ENCOUNTER
PHQ-9 SCORE 1/22/2016 1/13/2017 2/5/2018   Total Score - - -   Total Score 4 2 0       Routing refill request to provider for review/approval because:  BP out of range  Last OV 2.5.18 for a fall, when do you need to see again?      BP Readings from Last 6 Encounters:   02/05/18 146/62   09/06/17 154/62   08/11/17 116/66   07/10/17 136/82   07/03/17 154/81   06/28/17 152/82     Eleni Travis RN, BS  Clinical Nurse Triage.

## 2018-07-07 DIAGNOSIS — M70.70 BURSITIS OF HIP, UNSPECIFIED BURSA, UNSPECIFIED LATERALITY: ICD-10-CM

## 2018-07-07 NOTE — TELEPHONE ENCOUNTER
Last Written Prescription Date:  03/23/18  Last Fill Quantity: 270,  # refills: 0   Last office visit: 2/5/2018 with prescribing provider:  Dr Omer    Future Office Visit:   Next 5 appointments (look out 90 days)     Sep 07, 2018 10:00 AM CDT   SHORT with Marcy Soares PA-C   [facility]    [address] [place] [address] [phone]                     Requested Prescriptions   Pending Prescriptions Disp Refills     acetaminophen-codeine (TYLENOL WITH CODEINE #3) 300-30 MG per tablet 270 tablet 0     Sig: Take 1 tablet by mouth every 8 hours as needed for mild pain    There is no refill protocol information for this order

## 2018-07-10 RX ORDER — ACETAMINOPHEN AND CODEINE PHOSPHATE 300; 30 MG/1; MG/1
1 TABLET ORAL EVERY 8 HOURS PRN
Qty: 270 TABLET | Refills: 0 | Status: SHIPPED | OUTPATIENT
Start: 2018-07-10 | End: 2018-09-07

## 2018-09-05 DIAGNOSIS — I10 ESSENTIAL HYPERTENSION: ICD-10-CM

## 2018-09-05 NOTE — TELEPHONE ENCOUNTER
"Requested Prescriptions   Pending Prescriptions Disp Refills     lisinopril (PRINIVIL/ZESTRIL) 40 MG tablet [Pharmacy Med Name: LISINOPRIL 40MG TABS] 90 tablet 1     Sig: TAKE ONE TABLET BY MOUTH ONCE DAILY    ACE Inhibitors (Including Combos) Protocol Failed    9/5/2018 10:50 AM       Failed - Blood pressure under 140/90 in past 12 months    BP Readings from Last 3 Encounters:   02/05/18 146/62   09/06/17 154/62   08/11/17 116/66                Passed - Recent (12 mo) or future (30 days) visit within the authorizing provider's specialty    Patient had office visit in the last 12 months or has a visit in the next 30 days with authorizing provider or within the authorizing provider's specialty.  See \"Patient Info\" tab in inbasket, or \"Choose Columns\" in Meds & Orders section of the refill encounter.           Passed - Patient is age 18 or older       Passed - No active pregnancy on record       Passed - Normal serum creatinine on file in past 12 months    Recent Labs   Lab Test  02/05/18   1145   CR  0.70            Passed - Normal serum potassium on file in past 12 months    Recent Labs   Lab Test  02/05/18   1145   POTASSIUM  4.1            Passed - No positive pregnancy test in past 12 months        Last Written Prescription Date:  03/07/18  Last Fill Quantity: 90,  # refills: 1   Last office visit: 2/5/2018 with prescribing provider:  Dr Omer   Future Office Visit:   Next 5 appointments (look out 90 days)     Sep 07, 2018 10:00 AM CDT   [facility]    [address] [facility] [address] [phone]                   "

## 2018-09-07 ENCOUNTER — OFFICE VISIT (OUTPATIENT)
Dept: FAMILY MEDICINE | Facility: CLINIC | Age: 83
End: 2018-09-07
Payer: MEDICARE

## 2018-09-07 VITALS
RESPIRATION RATE: 16 BRPM | BODY MASS INDEX: 24.17 KG/M2 | DIASTOLIC BLOOD PRESSURE: 61 MMHG | WEIGHT: 128 LBS | HEIGHT: 61 IN | TEMPERATURE: 97.9 F | SYSTOLIC BLOOD PRESSURE: 111 MMHG | HEART RATE: 50 BPM | OXYGEN SATURATION: 96 %

## 2018-09-07 DIAGNOSIS — N95.1 SYMPTOMATIC MENOPAUSAL OR FEMALE CLIMACTERIC STATES: ICD-10-CM

## 2018-09-07 DIAGNOSIS — F41.1 GENERALIZED ANXIETY DISORDER: ICD-10-CM

## 2018-09-07 DIAGNOSIS — M70.70 BURSITIS OF HIP, UNSPECIFIED BURSA, UNSPECIFIED LATERALITY: ICD-10-CM

## 2018-09-07 DIAGNOSIS — Z78.0 ASYMPTOMATIC POSTMENOPAUSAL STATUS: ICD-10-CM

## 2018-09-07 DIAGNOSIS — I21.4 NON-STEMI (NON-ST ELEVATED MYOCARDIAL INFARCTION) (H): Primary | ICD-10-CM

## 2018-09-07 DIAGNOSIS — G89.29 OTHER CHRONIC PAIN: ICD-10-CM

## 2018-09-07 DIAGNOSIS — G60.9 IDIOPATHIC PERIPHERAL NEUROPATHY: ICD-10-CM

## 2018-09-07 DIAGNOSIS — I10 ESSENTIAL HYPERTENSION: ICD-10-CM

## 2018-09-07 PROCEDURE — 99214 OFFICE O/P EST MOD 30 MIN: CPT | Performed by: PHYSICIAN ASSISTANT

## 2018-09-07 RX ORDER — ESTRADIOL 0.1 MG/G
2 CREAM VAGINAL
Qty: 6 G | Refills: 3 | Status: SHIPPED | OUTPATIENT
Start: 2018-09-07 | End: 2019-02-09

## 2018-09-07 RX ORDER — METOPROLOL SUCCINATE 100 MG/1
100 TABLET, EXTENDED RELEASE ORAL DAILY
Qty: 90 TABLET | Refills: 1 | Status: SHIPPED | OUTPATIENT
Start: 2018-09-07 | End: 2019-02-19

## 2018-09-07 RX ORDER — LISINOPRIL 40 MG/1
40 TABLET ORAL DAILY
Qty: 90 TABLET | Refills: 1 | Status: SHIPPED | OUTPATIENT
Start: 2018-09-07 | End: 2019-02-19

## 2018-09-07 RX ORDER — SERTRALINE HYDROCHLORIDE 100 MG/1
100 TABLET, FILM COATED ORAL DAILY
Qty: 90 TABLET | Refills: 1 | Status: SHIPPED | OUTPATIENT
Start: 2018-09-07 | End: 2019-02-19

## 2018-09-07 RX ORDER — ISOSORBIDE DINITRATE 10 MG/1
10 TABLET ORAL 3 TIMES DAILY
Qty: 270 TABLET | Refills: 3 | Status: SHIPPED | OUTPATIENT
Start: 2018-09-07 | End: 2019-02-19

## 2018-09-07 RX ORDER — LISINOPRIL 40 MG/1
TABLET ORAL
Qty: 90 TABLET | Refills: 1 | OUTPATIENT
Start: 2018-09-07

## 2018-09-07 RX ORDER — ACETAMINOPHEN AND CODEINE PHOSPHATE 300; 30 MG/1; MG/1
1 TABLET ORAL EVERY 8 HOURS PRN
Qty: 270 TABLET | Refills: 0 | Status: SHIPPED | OUTPATIENT
Start: 2018-09-07 | End: 2019-04-02

## 2018-09-07 RX ORDER — AMLODIPINE BESYLATE 10 MG/1
10 TABLET ORAL DAILY
Qty: 90 TABLET | Refills: 3 | Status: SHIPPED | OUTPATIENT
Start: 2018-09-07 | End: 2019-02-19

## 2018-09-07 RX ORDER — GABAPENTIN 800 MG/1
TABLET ORAL
Qty: 270 TABLET | Refills: 1 | Status: SHIPPED | OUTPATIENT
Start: 2018-09-07 | End: 2019-02-19

## 2018-09-07 NOTE — MR AVS SNAPSHOT
"              After Visit Summary   9/7/2018    Daya Ignacio    MRN: [id number]           Patient Information     Date Of Birth          10/11/1930        Visit Information        Provider Department      9/7/2018 10:00 AM Marcy Soares PA-C [facility]        Today's Diagnoses     Asymptomatic postmenopausal status    -  1       Follow-ups after your visit        Your next 10 appointments already scheduled     Oct 05, 2018 10:00 AM CDT   MA SCREENING DIGITAL BILATERAL with CRMA1   [facility] ([facility])    [address] [facility] [address] [phone]           Do not use any powder, lotion or deodorant under your arms or on your breast. If you do, we will ask you to remove it before your exam.  Wear comfortable, two-piece clothing.  If you have any allergies, tell your care team.  Bring any previous mammograms from other facilities or have them mailed to the breast center. Three-dimensional (3D) mammograms are available at [facility] locations in [facility], [facility], [facility], and [place]. [facility] locations include [place] and [facility] in [place]. Benefits of 3D mammograms include: - Improved rate of cancer detection - Decreases your chance of having to go back for more tests, which means fewer: - \"False-positive\" results (This means that there is an abnormal area but it isn't cancer.) - Invasive testing procedures, such as a biopsy or surgery - Can provide clearer images of the breast if you have dense breast tissue. 3D mammography is an optional exam that anyone can have with a 2D mammogram. It doesn't replace or take the place of a 2D mammogram. 2D mammograms remain an effective screening test for all women.  Not all insurance companies cover the cost of a 3D mammogram. Check with your insurance.              Who to contact     If you have questions or need " follow up information about today's clinic visit or your schedule please contact [facility] directly at [phone].  Normal or non-critical lab and imaging results will be communicated to you by MyChart, letter or phone within 4 business days after the clinic has received the results. If you do not hear from us within 7 days, please contact the clinic through MyChart or phone. If you have a critical or abnormal lab result, we will notify you by phone as soon as possible.  Submit refill requests through [employer] or call your pharmacy and they will forward the refill request to us. Please allow 3 business days for your refill to be completed.          Additional Information About Your Visit        Care EveryWhere ID     This is your Care EveryWhere ID. This could be used by other organizations to access your [facility] medical records  [id number]        Your Vitals Were     Pulse Temperature Respirations Pulse Oximetry BMI (Body Mass Index)       50 97.9  F (36.6  C) (Oral) 16 96% 22.67 kg/m2        Blood Pressure from Last 3 Encounters:   09/07/18 111/61   02/05/18 146/62   09/06/17 154/62    Weight from Last 3 Encounters:   09/07/18 128 lb (58.1 kg)   02/05/18 127 lb (57.6 kg)   09/06/17 125 lb (56.7 kg)              Today, you had the following     No orders found for display       Primary Care Provider Office Phone # Fax #    Marcy Soares PA-C [phone]       [address] [facility] [address]        Equal Access to Services     MARIZOL AMOR : Hadii aad ku hadasho Soomaali, waaxda luqadaha, qaybta kaalmada adeegyada, kinga olvera . So [facility] [phone].    ATENCIÓN: Si habla español, tiene a cohen disposición servicios gratuitos de asistencia lingüística. Llame al [phone].    We comply with applicable federal civil rights laws and [place] laws. We do not discriminate on the basis of race, color, national origin, age, disability, sex,  sexual orientation, or gender identity.            Thank you!     Thank you for choosing [facility]  for your care. Our goal is always to provide you with excellent care. Hearing back from our patients is one way we can continue to improve our services. Please take a few minutes to complete the written survey that you may receive in the mail after your visit with us. Thank you!             Your Updated Medication List - Protect others around you: Learn how to safely use, store and throw away your medicines at www.disposemymeds.org.          This list is accurate as of 9/7/18 10:10 AM.  Always use your most recent med list.                   Brand Name Dispense Instructions for use Diagnosis    acetaminophen-codeine 300-30 MG per tablet    TYLENOL WITH CODEINE #3    270 tablet    Take 1 tablet by mouth every 8 hours as needed for mild pain    Bursitis of hip, unspecified bursa, unspecified laterality       ADVIL 200 MG tablet   Generic drug:  ibuprofen      Take 200 mg by mouth every 4 hours as needed for mild pain or pain        amLODIPine 10 MG tablet    NORVASC    90 tablet    Take 1 tablet (10 mg) by mouth daily    Non-STEMI (non-ST elevated myocardial infarction) (H)       aspirin 81 MG EC tablet     30 tablet    Take 1 tablet (81 mg) by mouth daily    Non-STEMI (non-ST elevated myocardial infarction) (H)       CALCIUM 600 PO      Take 1 tablet by mouth daily        cloNIDine 0.1 MG tablet    CATAPRES    180 tablet    TAKE ONE TABLET BY MOUTH TWO TIMES A DAY    Essential hypertension       cyanocobalamin 1000 MCG tablet    vitamin  B-12     Take 1,000 mcg by mouth every morning        estradiol 0.1 MG/GM cream    ESTRACE    6 g    Place 2 g vaginally three times a week paraben-free    Symptomatic menopausal or female climacteric states       gabapentin 800 MG tablet    NEURONTIN    270 tablet    TAKE ONE TABLET BY MOUTH THREE TIMES A DAY    Idiopathic peripheral neuropathy       isosorbide  dinitrate 10 MG tablet    ISORDIL    270 tablet    Take 1 tablet (10 mg) by mouth 3 times daily    Non-STEMI (non-ST elevated myocardial infarction) (H)       lisinopril 40 MG tablet    PRINIVIL/ZESTRIL    90 tablet    TAKE ONE TABLET BY MOUTH ONCE DAILY    Essential hypertension       loperamide 2 MG capsule    IMODIUM    20 capsule    Take 1 capsule (2 mg) by mouth 4 times daily as needed for diarrhea    Diarrhea, unspecified type       * metoprolol succinate 25 MG 24 hr tablet    TOPROL-XL    30 tablet    Take 1 tablet (25 mg) by mouth daily    HTN (hypertension), benign       * metoprolol succinate 100 MG 24 hr tablet    TOPROL-XL    90 tablet    TAKE ONE TABLET BY MOUTH DAILY    HTN (hypertension), benign       PRESERVISION AREDS Caps      Take 1 capsule by mouth 2 times daily        sertraline 100 MG tablet    ZOLOFT    90 tablet    TAKE ONE TABLET BY MOUTH EVERY DAY    Generalized anxiety disorder       vitamin D3 2000 units Caps      Take 2,000 Units by mouth daily (with dinner)        * Notice:  This list has 2 medication(s) that are the same as other medications prescribed for you. Read the directions carefully, and ask your doctor or other care provider to review them with you.

## 2018-09-07 NOTE — LETTER
[facility]    09/07/18    Patient: Daya Ignacio  YOB: 1930  Medical Record Number: [id number]                                                                  Controlled Substance Agreement  I understand that my care provider has prescribed controlled substances (narcotics, tranquilizers, and/or stimulants) to help manage my condition(s).  I am taking this medicine to help me function or work.  I know that this is strong medicine.  It could have serious side effects and even cause a dependency on the drug.  If I stop these medicines suddenly, I could have severe withdrawal symptoms.    The risks, benefits, and side effects of these medication(s) were explained to me.  I agree that:  1. I will take part in other treatments as advised by my provider.  This may be psychiatry or counseling, physical therapy, behavioral therapy, group treatment, or a referral to a pain clinic.  I will reduce or stop my medicine when my provider tells me to do so.   2. I will take my medicines as prescribed.  I will not change the dose or schedule unless my provider tells me to.  There will be no refills if I  run out early.   I may be contacted at any time without warning and asked to complete a drug test or pill count.   3. I will keep all my appointments at the clinic.  If I miss appointments or fail to follow instructions, my provider may stop my medicine.  4. I will not ask other providers to prescribe controlled substances. And I will not accept controlled substances from other people. If I need another prescribed controlled substance for a new reason, I will notify my provider within one business day.  5. If I enroll in the [place] Medical Marijuana program, I will tell my provider.  I will also sign an agreement to share my medical records with my provider.  6. I will use one pharmacy to fill all of my controlled substance prescriptions.  If my prescription is mailed to my pharmacy, it may  take 5 to 7 days for my medicine to be ready.  7. I understand that my provider, clinic care team, and pharmacy can track controlled substance prescriptions from other providers through a central database (prescription monitoring program).  8. I will bring in my list of medications (or my medicine bottles) each time I come to the clinic.  [id number] REV-  07/2018                                                                                                                                   Page 1 of 2      [facility]    09/07/18    Patient: Daya Ignacio  YOB: 1930  Medical Record Number: [id number]    9. Refills of controlled substances will be made only during office hours.  It is up to me to make sure that I do not run out of my medicines on weekends or holidays.    10. I am responsible for my prescriptions.  If the medicine/prescription is lost or stolen, it will not be replaced.   I also agree not to share these medicines with anyone.  11. I agree to not use ANY illegal or recreational drugs.  This includes marijuana, cocaine, bath salts or other drugs.  I agree not to use alcohol unless my provider says I may.  I agree to give urine samples whenever asked.  If I fail to give a urine sample, the provider may stop my medicine.     12. I will tell my nurse or provider right away if I become pregnant or have a new medical problem treated outside of [facility].  13. I understand that this medicine can affect my thinking and judgment.  It may be unsafe for me to drive, use machinery and do dangerous tasks.  I will not do any of these things until I know how the medicine affects me.  If my dose changes, I will wait to see how it affects me.  I will contact my provider if I have concerns about medicine side effects.  I understand that if I do not follow any of the conditions above, my prescriptions or treatment may be stopped.    I agree that my provider, clinic care team, and  pharmacy may work with any city, state or federal law enforcement agency that investigates the misuse, sale, or other diversion of my controlled medicine. I will allow my provider to discuss my care with or share a copy of this agreement with any other treating provider, pharmacy or emergency room where I receive care.  I agree to give up (waive) any right of privacy or confidentiality with respect to these authorizations.   I have read this agreement and have asked questions about anything I did not understand.   ___________________________________    ___________________________  Patient Signature                                                           Date and Time  ___________________________________     ____________________________  Witness                                                                            Date and Time  ___________________________________  Marcy Soares PA-C  [id number] REV-  07/2018                                                                                                                                                   Page 2 of 2

## 2018-09-07 NOTE — PROGRESS NOTES
SUBJECTIVE:   Daya Ignacio is a 87 year old female who presents to clinic today for the following health issues:      Chronic Pain Follow-Up       Type / Location of Pain: leg and back pain  Analgesia/pain control:       Recent changes:  worse      Overall control: Tolerable with discomfort  Activity level/function:      Daily activities:  Can do most things most days, with some rest    Work:  not applicable  Adverse effects:  No  Adherance    Taking medication as directed?  Yes    Participating in other treatments: no   Risk Factors:    Sleep:  Fair    Mood/anxiety:  controlled    Recent family or social stressors:  none noted    Other aggravating factors: prolonged standing  PHQ-9 SCORE 1/22/2016 1/13/2017 2/5/2018   Total Score - - -   Total Score 4 2 0     LISSA-7 SCORE 1/22/2016 1/13/2017 2/5/2018   Total Score - - -   Total Score 0 0 0     Encounter-Level CSA:     There are no encounter-level csa.          Amount of exercise or physical activity: walks daily, walks in apartment hallways, 15-30 minutes    Problems taking medications regularly: No    Medication side effects: none    Diet: regular (no restrictions)        Hypertension Follow-up      Outpatient blood pressures are not being checked.    Low Salt Diet: low salt    Vascular Disease Follow-up:  Coronary Artery Disease (CAD)      Chest pain or pressure, left side neck or arm pain: No    Shortness of breath/increased sweats/nausea with exertion: No    Pain in calves walking 1-2 blocks: No    Worsened or new symptoms since last visit: No    Nitroglycerin use: no    Daily aspirin use: Yes    Anxiety Follow-Up    Status since last visit: No change    Other associated symptoms:None    Complicating factors:   Significant life event: No   Current substance abuse: None  Depression symptoms: No  LISSA-7 SCORE 1/22/2016 1/13/2017 2/5/2018   Total Score - - -   Total Score 0 0 0       LISSA-7    Problem list and histories reviewed & adjusted, as  "indicated.  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     Hypertension goal BP (blood pressure) < 140/90     Chronic pain     NSTEMI (non-ST elevated myocardial infarction) (H)     Urinary tract infection without hematuria     Hip pain     Trochanteric fracture of left femur (H)     Diarrhea     Past Surgical History:   Procedure Laterality Date     C NONSPECIFIC PROCEDURE      lt hip pain     ORTHOPEDIC SURGERY         Social History   Substance Use Topics     Smoking status: Never Smoker     Smokeless tobacco: Never Used     Alcohol use No     Family History   Problem Relation Age of Onset     Alzheimer Disease Mother      mild     Cardiovascular Father      heart attack     Neurologic Disorder Brother 83     Parkinson's           Reviewed and updated as needed this visit by clinical staff  Tobacco  Allergies  Med Hx  Surg Hx  Fam Hx  Soc Hx      Reviewed and updated as needed this visit by Provider         ROS:  Constitutional, HEENT, cardiovascular, pulmonary, gi and gu systems are negative, except as otherwise noted.    OBJECTIVE:     /61 (BP Location: Right arm, Patient Position: Chair, Cuff Size: Adult Regular)  Pulse 50  Temp 97.9  F (36.6  C) (Oral)  Resp 16  Ht 5' 1\" (1.549 m)  Wt 128 lb (58.1 kg)  SpO2 96%  BMI 24.19 kg/m2  Body mass index is 24.19 kg/(m^2).  GENERAL APPEARANCE: healthy, alert and no distress  NECK: no adenopathy, no asymmetry, masses, or scars and thyroid normal to palpation  RESP: lungs clear to auscultation - no rales, rhonchi or wheezes  CV: regular rates and rhythm, normal S1 S2, no S3 or S4 and no murmur, click or rub  MS: extremities normal- no gross deformities noted  NEURO: " Normal strength and tone, mentation intact and speech normal  PSYCH: mentation appears normal and affect normal/bright        ASSESSMENT/PLAN:             1. Non-STEMI (non-ST elevated myocardial infarction) (H)  Stable.   - isosorbide dinitrate (ISORDIL) 10 MG tablet; Take 1 tablet (10 mg) by mouth 3 times daily  Dispense: 270 tablet; Refill: 3  - amLODIPine (NORVASC) 10 MG tablet; Take 1 tablet (10 mg) by mouth daily  Dispense: 90 tablet; Refill: 3    2. Bursitis of hip, unspecified bursa, unspecified laterality  Stable.   - acetaminophen-codeine (TYLENOL WITH CODEINE #3) 300-30 MG per tablet; Take 1 tablet by mouth every 8 hours as needed for mild pain  Dispense: 270 tablet; Refill: 0    3. Essential hypertension  Stable.   - lisinopril (PRINIVIL/ZESTRIL) 40 MG tablet; Take 1 tablet (40 mg) by mouth daily  Dispense: 90 tablet; Refill: 1  - metoprolol succinate (TOPROL-XL) 100 MG 24 hr tablet; Take 1 tablet (100 mg) by mouth daily  Dispense: 90 tablet; Refill: 1    4. Idiopathic peripheral neuropathy  Stable.   - gabapentin (NEURONTIN) 800 MG tablet; TAKE ONE TABLET BY MOUTH THREE TIMES A DAY  Dispense: 270 tablet; Refill: 1    5. Generalized anxiety disorder  Stable.   - sertraline (ZOLOFT) 100 MG tablet; Take 1 tablet (100 mg) by mouth daily  Dispense: 90 tablet; Refill: 1    6. Symptomatic menopausal or female climacteric states    - estradiol (ESTRACE) 0.1 MG/GM cream; Place 2 g vaginally three times a week paraben-free  Dispense: 6 g; Refill: 3    7. Asymptomatic postmenopausal status  Loss of heigt.   - DEXA HIP/PELVIS/SPINE - Future; Future    F/u 6 months with labs.     Marcy Soares PA-C  [facility]

## 2018-11-01 DIAGNOSIS — I21.4 NON-STEMI (NON-ST ELEVATED MYOCARDIAL INFARCTION) (H): ICD-10-CM

## 2018-11-01 RX ORDER — AMLODIPINE BESYLATE 10 MG/1
TABLET ORAL
Qty: 90 TABLET | Refills: 3 | OUTPATIENT
Start: 2018-11-01

## 2018-11-01 NOTE — TELEPHONE ENCOUNTER
"Requested Prescriptions   Pending Prescriptions Disp Refills     amLODIPine (NORVASC) 10 MG tablet [Pharmacy Med Name: AMLODIPINE BESYLATE 10MG TABS] 90 tablet 3    Last Written Prescription Date:  9/7/2018  Last Fill Quantity: 90,  # refills: 3   Last office visit: 9/7/2018 with prescribing provider:  Rodger  Future Office Visit:     Sig: TAKE ONE TABLET BY MOUTH ONCE DAILY    Calcium Channel Blockers Protocol  Passed    11/1/2018 10:30 AM       Passed - Blood pressure under 140/90 in past 12 months    BP Readings from Last 3 Encounters:   09/07/18 111/61   02/05/18 146/62   09/06/17 154/62                Passed - Recent (12 mo) or future (30 days) visit within the authorizing provider's specialty    Patient had office visit in the last 12 months or has a visit in the next 30 days with authorizing provider or within the authorizing provider's specialty.  See \"Patient Info\" tab in inbasket, or \"Choose Columns\" in Meds & Orders section of the refill encounter.             Passed - Patient is age 18 or older       Passed - No active pregnancy on record       Passed - Normal serum creatinine on file in past 12 months    Recent Labs   Lab Test  02/05/18   1145   CR  0.70            Passed - No positive pregnancy test in past 12 months        Patient has refills remaining at pharmacy.  GRUPO KumariN, RN  [employer] [facility]    "

## 2018-11-06 DIAGNOSIS — I10 ESSENTIAL HYPERTENSION: ICD-10-CM

## 2018-11-06 RX ORDER — CLONIDINE HYDROCHLORIDE 0.1 MG/1
TABLET ORAL
Qty: 180 TABLET | Refills: 0 | Status: SHIPPED | OUTPATIENT
Start: 2018-11-06 | End: 2019-02-08

## 2018-11-06 NOTE — TELEPHONE ENCOUNTER
Signed Prescriptions:                        Disp   Refills    cloNIDine (CATAPRES) 0.1 MG tablet         180 ta*0        Sig: TAKE ONE TABLET BY MOUTH TWO TIMES A DAY  Authorizing Provider: LEEANNE COSME  Ordering User: JULIANO PENA    Pt calls re isosorbide. Problem with early refill at pharmacy? Has refills on file.  We called Elma in our pharmacy, she will call Daya right back to discuss.  Prescription  approved per [facility] Refill Protocol.  Juliano Pena, ROBERTA

## 2019-02-08 DIAGNOSIS — N95.1 SYMPTOMATIC MENOPAUSAL OR FEMALE CLIMACTERIC STATES: ICD-10-CM

## 2019-02-08 DIAGNOSIS — I10 ESSENTIAL HYPERTENSION: ICD-10-CM

## 2019-02-08 NOTE — TELEPHONE ENCOUNTER
"Last Written Prescription Date:  11/06/18   Last Fill Quantity: 180,  # refills: 0   Last office visit: 9/7/2018 with prescribing provider:  Marcy Starkey Office Visit:      Last Written Prescription Date:  09/07/18  Last Fill Quantity: 6g,  # refills: 3   Last office visit: 9/7/2018 with prescribing provider:  Marcy Starkey Office Visit:       Requested Prescriptions   Pending Prescriptions Disp Refills     cloNIDine (CATAPRES) 0.1 MG tablet [Pharmacy Med Name: CLONIDINE HCL 0.1MG TABS] 180 tablet 0     Sig: TAKE ONE TABLET BY MOUTH TWO TIMES A DAY    Central Acting Antiadrenergic Agents Failed - 2/8/2019  9:30 AM       Failed - Normal serum creatinine on file within past 12 months    Recent Labs   Lab Test 02/05/18  1145   CR 0.70            Passed - Blood pressure under 140/90 in past 12 months    BP Readings from Last 3 Encounters:   09/07/18 111/61   02/05/18 146/62   09/06/17 154/62                Passed - Patient is 6 years of age or older       Passed - Recent (12 mo) or future (30 days) visit within the authorizing provider's specialty    Patient had office visit in the last 12 months or has a visit in the next 30 days with authorizing provider or within the authorizing provider's specialty.  See \"Patient Info\" tab in inbasket, or \"Choose Columns\" in Meds & Orders section of the refill encounter.             Passed - Medication is active on med list       Passed - Patient not pregnant       Passed - No positive pregnancy test on file in past 12 months        estradiol (ESTRACE) 0.1 MG/GM vaginal cream 6 g 3     Sig: Place 2 g vaginally three times a week paraben-free    There is no refill protocol information for this order          "

## 2019-02-09 RX ORDER — ESTRADIOL 0.1 MG/G
2 CREAM VAGINAL
Qty: 6 G | Refills: 0 | Status: SHIPPED | OUTPATIENT
Start: 2019-02-11 | End: 2019-02-11

## 2019-02-09 RX ORDER — CLONIDINE HYDROCHLORIDE 0.1 MG/1
TABLET ORAL
Qty: 180 TABLET | Refills: 0 | Status: SHIPPED | OUTPATIENT
Start: 2019-02-09 | End: 2019-05-22

## 2019-02-09 NOTE — TELEPHONE ENCOUNTER
Son walks in, demands refills now, will inform to see LUIS March for 6 month visit  Prescription approved per [facility] Refill Protocol.  Tram Moura, RN, BSN

## 2019-02-11 ENCOUNTER — TELEPHONE (OUTPATIENT)
Dept: PHARMACY | Facility: CLINIC | Age: 84
End: 2019-02-11

## 2019-02-11 DIAGNOSIS — N95.1 SYMPTOMATIC MENOPAUSAL OR FEMALE CLIMACTERIC STATES: ICD-10-CM

## 2019-02-11 RX ORDER — ESTRADIOL 0.1 MG/G
2 CREAM VAGINAL
Qty: 6 G | Refills: 3 | Status: SHIPPED | OUTPATIENT
Start: 2019-02-11 | End: 2020-05-06

## 2019-02-11 NOTE — TELEPHONE ENCOUNTER
2-11-19      Patient calls today requesting refills on medications as pcp on vacation this week.    She needs her estradiol vaginal cream -paraben free --patient agreed then to make viki.to see pcp.    Kayli Real Rph.  Medication Therapy Management Provider  [phone]

## 2019-02-19 ENCOUNTER — OFFICE VISIT (OUTPATIENT)
Dept: FAMILY MEDICINE | Facility: CLINIC | Age: 84
End: 2019-02-19
Payer: MEDICARE

## 2019-02-19 VITALS
DIASTOLIC BLOOD PRESSURE: 48 MMHG | HEIGHT: 60 IN | BODY MASS INDEX: 24.54 KG/M2 | RESPIRATION RATE: 16 BRPM | HEART RATE: 51 BPM | TEMPERATURE: 98 F | WEIGHT: 125 LBS | SYSTOLIC BLOOD PRESSURE: 129 MMHG

## 2019-02-19 DIAGNOSIS — I10 ESSENTIAL HYPERTENSION: ICD-10-CM

## 2019-02-19 DIAGNOSIS — F41.1 GENERALIZED ANXIETY DISORDER: ICD-10-CM

## 2019-02-19 DIAGNOSIS — I21.4 NON-STEMI (NON-ST ELEVATED MYOCARDIAL INFARCTION) (H): ICD-10-CM

## 2019-02-19 DIAGNOSIS — G60.9 IDIOPATHIC PERIPHERAL NEUROPATHY: ICD-10-CM

## 2019-02-19 LAB
ERYTHROCYTE [DISTWIDTH] IN BLOOD BY AUTOMATED COUNT: 14.3 % (ref 10–15)
HCT VFR BLD AUTO: 39.9 % (ref 35–47)
HGB BLD-MCNC: 12.9 G/DL (ref 11.7–15.7)
MCH RBC QN AUTO: 29.9 PG (ref 26.5–33)
MCHC RBC AUTO-ENTMCNC: 32.3 G/DL (ref 31.5–36.5)
MCV RBC AUTO: 92 FL (ref 78–100)
PLATELET # BLD AUTO: 172 10E9/L (ref 150–450)
RBC # BLD AUTO: 4.32 10E12/L (ref 3.8–5.2)
WBC # BLD AUTO: 7.5 10E9/L (ref 4–11)

## 2019-02-19 PROCEDURE — 99214 OFFICE O/P EST MOD 30 MIN: CPT | Performed by: PHYSICIAN ASSISTANT

## 2019-02-19 PROCEDURE — 80053 COMPREHEN METABOLIC PANEL: CPT | Performed by: PHYSICIAN ASSISTANT

## 2019-02-19 PROCEDURE — 85027 COMPLETE CBC AUTOMATED: CPT | Performed by: PHYSICIAN ASSISTANT

## 2019-02-19 PROCEDURE — 36415 COLL VENOUS BLD VENIPUNCTURE: CPT | Performed by: PHYSICIAN ASSISTANT

## 2019-02-19 PROCEDURE — 80061 LIPID PANEL: CPT | Performed by: PHYSICIAN ASSISTANT

## 2019-02-19 RX ORDER — SERTRALINE HYDROCHLORIDE 100 MG/1
100 TABLET, FILM COATED ORAL DAILY
Qty: 90 TABLET | Refills: 1 | Status: ON HOLD | OUTPATIENT
Start: 2019-02-19 | End: 2020-11-01

## 2019-02-19 RX ORDER — GABAPENTIN 800 MG/1
TABLET ORAL
Qty: 360 TABLET | Refills: 1 | Status: SHIPPED | OUTPATIENT
Start: 2019-02-19 | End: 2019-05-26

## 2019-02-19 RX ORDER — LISINOPRIL 40 MG/1
40 TABLET ORAL DAILY
Qty: 90 TABLET | Refills: 1 | Status: ON HOLD | OUTPATIENT
Start: 2019-02-19 | End: 2019-07-17

## 2019-02-19 RX ORDER — ISOSORBIDE DINITRATE 10 MG/1
10 TABLET ORAL 3 TIMES DAILY
Qty: 270 TABLET | Refills: 3 | Status: SHIPPED | OUTPATIENT
Start: 2019-02-19 | End: 2024-08-07

## 2019-02-19 RX ORDER — AMLODIPINE BESYLATE 10 MG/1
10 TABLET ORAL DAILY
Qty: 90 TABLET | Refills: 1 | Status: SHIPPED | OUTPATIENT
Start: 2019-02-19 | End: 2019-09-09

## 2019-02-19 RX ORDER — METOPROLOL SUCCINATE 100 MG/1
100 TABLET, EXTENDED RELEASE ORAL DAILY
Qty: 90 TABLET | Refills: 1 | Status: SHIPPED | OUTPATIENT
Start: 2019-02-19 | End: 2019-09-16

## 2019-02-19 ASSESSMENT — ANXIETY QUESTIONNAIRES
5. BEING SO RESTLESS THAT IT IS HARD TO SIT STILL: NOT AT ALL
1. FEELING NERVOUS, ANXIOUS, OR ON EDGE: NOT AT ALL
2. NOT BEING ABLE TO STOP OR CONTROL WORRYING: NOT AT ALL
IF YOU CHECKED OFF ANY PROBLEMS ON THIS QUESTIONNAIRE, HOW DIFFICULT HAVE THESE PROBLEMS MADE IT FOR YOU TO DO YOUR WORK, TAKE CARE OF THINGS AT HOME, OR GET ALONG WITH OTHER PEOPLE: NOT DIFFICULT AT ALL
3. WORRYING TOO MUCH ABOUT DIFFERENT THINGS: NOT AT ALL
7. FEELING AFRAID AS IF SOMETHING AWFUL MIGHT HAPPEN: NOT AT ALL
GAD7 TOTAL SCORE: 0
6. BECOMING EASILY ANNOYED OR IRRITABLE: NOT AT ALL

## 2019-02-19 ASSESSMENT — PATIENT HEALTH QUESTIONNAIRE - PHQ9
5. POOR APPETITE OR OVEREATING: NOT AT ALL
SUM OF ALL RESPONSES TO PHQ QUESTIONS 1-9: 1

## 2019-02-19 ASSESSMENT — MIFFLIN-ST. JEOR: SCORE: 922.47

## 2019-02-19 NOTE — PROGRESS NOTES
"  SUBJECTIVE:   Daya Ignacio is a 88 year old female who presents to clinic today for the following health issues:      Hypertension Follow-up      Outpatient blood pressures are being checked at home.  Results are \"pretty good\", hasn't checked for a while.  Doesn't remember what the #'s are..    Low Salt Diet: no added salt    Anxiety Follow-Up    Status since last visit: No change    Other associated symptoms:None    Complicating factors:   Significant life event: No   Current substance abuse: None  Depression symptoms: No  LISSA-7 SCORE 1/22/2016 1/13/2017 2/5/2018   Total Score - - -   Total Score 0 0 0       LISSA-7    Amount of exercise or physical activity: 4-5 days/week for an average of 15-30 minutes    Problems taking medications regularly: No    Medication side effects: none    Diet: regular (no restrictions)      Medication Followup of Gabapentin,     Taking Medication as prescribed: yes    Side Effects:  None    Medication Helping Symptoms:  Yes, but wonders if it could be increased. Still having some symptoms.        Vascular Disease Follow-up:  Coronary Artery Disease (CAD)      Chest pain or pressure, left side neck or arm pain: No    Shortness of breath/increased sweats/nausea with exertion: No    Pain in calves walking 1-2 blocks: No    Worsened or new symptoms since last visit: No    Nitroglycerin use: no    Daily aspirin use: Yes               Problem list and histories reviewed & adjusted, as indicated.  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     Hypertension goal BP (blood pressure) < 140/90     Chronic pain     NSTEMI (non-ST elevated myocardial infarction) (H)     Urinary tract infection without " "hematuria     Hip pain     Trochanteric fracture of left femur (H)     Diarrhea     Past Surgical History:   Procedure Laterality Date     C NONSPECIFIC PROCEDURE      lt hip pain     ORTHOPEDIC SURGERY         Social History     Tobacco Use     Smoking status: Never Smoker     Smokeless tobacco: Never Used   Substance Use Topics     Alcohol use: No     Family History   Problem Relation Age of Onset     Alzheimer Disease Mother         mild     Cardiovascular Father         heart attack     Neurologic Disorder Brother 83        Parkinson's           Reviewed and updated as needed this visit by clinical staff  Tobacco  Allergies  Meds  Med Hx  Surg Hx  Fam Hx  Soc Hx      Reviewed and updated as needed this visit by Provider         ROS:  Constitutional, HEENT, cardiovascular, pulmonary, gi and gu systems are negative, except as otherwise noted.    OBJECTIVE:     /48 (BP Location: Right arm, Patient Position: Chair, Cuff Size: Adult Small)   Pulse 51   Temp 98  F (36.7  C) (Oral)   Resp 16   Ht 1.53 m (5' 0.25\")   Wt 56.7 kg (125 lb)   BMI 24.21 kg/m    Body mass index is 24.21 kg/m .  GENERAL APPEARANCE: healthy, alert and no distress  RESP: lungs clear to auscultation - no rales, rhonchi or wheezes  CV: regular rates and rhythm, normal S1 S2, no S3 or S4 and no murmur, click or rub  ABDOMEN: soft, nontender, without hepatosplenomegaly or masses and bowel sounds normal        ASSESSMENT/PLAN:             1. Non-STEMI (non-ST elevated myocardial infarction) (H)    - amLODIPine (NORVASC) 10 MG tablet; Take 1 tablet (10 mg) by mouth daily  Dispense: 90 tablet; Refill: 1  - isosorbide dinitrate (ISORDIL) 10 MG tablet; Take 1 tablet (10 mg) by mouth 3 times daily  Dispense: 270 tablet; Refill: 3  - Lipid panel reflex to direct LDL Fasting    2. Essential hypertension  Stable.   - lisinopril (PRINIVIL/ZESTRIL) 40 MG tablet; Take 1 tablet (40 mg) by mouth daily  Dispense: 90 tablet; Refill: 1  - " metoprolol succinate ER (TOPROL-XL) 100 MG 24 hr tablet; Take 1 tablet (100 mg) by mouth daily  Dispense: 90 tablet; Refill: 1  - Comprehensive metabolic panel  - Lipid panel reflex to direct LDL Fasting  - CBC with platelets    3. Generalized anxiety disorder    - sertraline (ZOLOFT) 100 MG tablet; Take 1 tablet (100 mg) by mouth daily  Dispense: 90 tablet; Refill: 1    4. Idiopathic peripheral neuropathy    - gabapentin (NEURONTIN) 800 MG tablet; TAKE ONE TABLET BY MOUTH in the morning, 1 tablet in the afternoon and 2 tablets at bedtime  Dispense: 360 tablet; Refill: 1        Marcy Soares PA-C  [facility]

## 2019-02-19 NOTE — LETTER
February 20, 2019      Daya Ignacio  [address]   [place] [address]        Dear ,    We are writing to inform you of your test results.    The results of your recent lab tests were normal.     Resulted Orders   Comprehensive metabolic panel   Result Value Ref Range    Sodium 144 133 - 144 mmol/L    Potassium 3.9 3.4 - 5.3 mmol/L    Chloride 108 94 - 109 mmol/L    Carbon Dioxide 29 20 - 32 mmol/L    Anion Gap 7 3 - 14 mmol/L    Glucose 102 (H) 70 - 99 mg/dL      Comment:      Fasting specimen    Urea Nitrogen 22 7 - 30 mg/dL    Creatinine 0.75 0.52 - 1.04 mg/dL    GFR Estimate 71 >60 mL/min/[1.73_m2]      Comment:      Non  GFR Calc  Starting 12/18/2018, serum creatinine based estimated GFR (eGFR) will be   calculated using the Chronic Kidney Disease Epidemiology Collaboration   (CKD-EPI) equation.      GFR Estimate If Black 82 >60 mL/min/[1.73_m2]      Comment:       GFR Calc  Starting 12/18/2018, serum creatinine based estimated GFR (eGFR) will be   calculated using the Chronic Kidney Disease Epidemiology Collaboration   (CKD-EPI) equation.      Calcium 9.8 8.5 - 10.1 mg/dL    Bilirubin Total 0.7 0.2 - 1.3 mg/dL    Albumin 3.8 3.4 - 5.0 g/dL    Protein Total 7.0 6.8 - 8.8 g/dL    Alkaline Phosphatase 65 40 - 150 U/L    ALT 15 0 - 50 U/L    AST 13 0 - 45 U/L   Lipid panel reflex to direct LDL Fasting   Result Value Ref Range    Cholesterol 176 <200 mg/dL    Triglycerides 95 <150 mg/dL      Comment:      Fasting specimen    HDL Cholesterol 49 (L) >49 mg/dL    LDL Cholesterol Calculated 108 (H) <100 mg/dL      Comment:      Above desirable:  100-129 mg/dl  Borderline High:  130-159 mg/dL  High:             160-189 mg/dL  Very high:       >189 mg/dl      Non HDL Cholesterol 127 <130 mg/dL   CBC with platelets   Result Value Ref Range    WBC 7.5 4.0 - 11.0 10e9/L    RBC Count 4.32 3.8 - 5.2 10e12/L    Hemoglobin 12.9 11.7 - 15.7 g/dL    Hematocrit 39.9  35.0 - 47.0 %    MCV 92 78 - 100 fl    MCH 29.9 26.5 - 33.0 pg    MCHC 32.3 31.5 - 36.5 g/dL    RDW 14.3 10.0 - 15.0 %    Platelet Count 172 150 - 450 10e9/L       If you have any questions or concerns, please call the clinic at the number listed above.       Sincerely,        Marcy Soares PA-C/AL

## 2019-02-20 LAB
ALBUMIN SERPL-MCNC: 3.8 G/DL (ref 3.4–5)
ALP SERPL-CCNC: 65 U/L (ref 40–150)
ALT SERPL W P-5'-P-CCNC: 15 U/L (ref 0–50)
ANION GAP SERPL CALCULATED.3IONS-SCNC: 7 MMOL/L (ref 3–14)
AST SERPL W P-5'-P-CCNC: 13 U/L (ref 0–45)
BILIRUB SERPL-MCNC: 0.7 MG/DL (ref 0.2–1.3)
BUN SERPL-MCNC: 22 MG/DL (ref 7–30)
CALCIUM SERPL-MCNC: 9.8 MG/DL (ref 8.5–10.1)
CHLORIDE SERPL-SCNC: 108 MMOL/L (ref 94–109)
CHOLEST SERPL-MCNC: 176 MG/DL
CO2 SERPL-SCNC: 29 MMOL/L (ref 20–32)
CREAT SERPL-MCNC: 0.75 MG/DL (ref 0.52–1.04)
GFR SERPL CREATININE-BSD FRML MDRD: 71 ML/MIN/{1.73_M2}
GLUCOSE SERPL-MCNC: 102 MG/DL (ref 70–99)
HDLC SERPL-MCNC: 49 MG/DL
LDLC SERPL CALC-MCNC: 108 MG/DL
NONHDLC SERPL-MCNC: 127 MG/DL
POTASSIUM SERPL-SCNC: 3.9 MMOL/L (ref 3.4–5.3)
PROT SERPL-MCNC: 7 G/DL (ref 6.8–8.8)
SODIUM SERPL-SCNC: 144 MMOL/L (ref 133–144)
TRIGL SERPL-MCNC: 95 MG/DL

## 2019-02-20 ASSESSMENT — ANXIETY QUESTIONNAIRES: GAD7 TOTAL SCORE: 0

## 2019-03-05 ENCOUNTER — TELEPHONE (OUTPATIENT)
Dept: FAMILY MEDICINE | Facility: CLINIC | Age: 84
End: 2019-03-05

## 2019-03-05 NOTE — TELEPHONE ENCOUNTER
Pt informed.  States has taken T3 and they don't do anything.   She hesitates to schedule OV because transportation. Needs to find a ride. Will call back for appointment or go to urgent care depending transportation arrangements.    Juliano Pena RN

## 2019-03-05 NOTE — TELEPHONE ENCOUNTER
03/05/19    Pt. Called with lower back pain after she bent over to pick something up for for 3 or 4 days, wanting to know if she could get something for the pain? She has been taking Advil but not working.  I did try to schedule an appt with he but she wanted to speak to Marcy/Nurse first.  Number Pt can be reached @ [phone]

## 2019-03-05 NOTE — TELEPHONE ENCOUNTER
Does patient have any Tylneol #3's at home she could use?  She could try one of those and if no improvement over next 24 hours, then I would be seen in office.    If she does not have anything, then I would recommend UC or OV in next 24 hours.    Marcy Soares PA-C

## 2019-04-02 DIAGNOSIS — G89.29 OTHER CHRONIC PAIN: ICD-10-CM

## 2019-04-02 DIAGNOSIS — M70.70 BURSITIS OF HIP, UNSPECIFIED BURSA, UNSPECIFIED LATERALITY: ICD-10-CM

## 2019-04-02 NOTE — TELEPHONE ENCOUNTER
T3      Last Written Prescription Date:  9/7/18  Last Fill Quantity: 270,   # refills: 0  Last Office Visit: 2/19/19  Future Office visit:       Routing refill request to provider for review/approval because:  Drug not on the [facility], [facility] or [facility] refill protocol or controlled substance    Elma Yates, Pharmacy [facility]  [phone]

## 2019-04-03 RX ORDER — ACETAMINOPHEN AND CODEINE PHOSPHATE 300; 30 MG/1; MG/1
1 TABLET ORAL EVERY 8 HOURS PRN
Qty: 30 TABLET | Refills: 0 | Status: SHIPPED | OUTPATIENT
Start: 2019-04-03 | End: 2019-04-16

## 2019-04-03 NOTE — TELEPHONE ENCOUNTER
Routing refill request to provider for review/approval because:  Drug not on the [facility] refill protocol     : 4.3.19  Last filled 11.14.19 #270  Also taking Gabapentin  Annual Controlled substance agreement: 9.7.18  Annual drug screening: not on file    Rebecca Travis RN

## 2019-04-08 ENCOUNTER — OFFICE VISIT (OUTPATIENT)
Dept: FAMILY MEDICINE | Facility: CLINIC | Age: 84
End: 2019-04-08
Payer: MEDICARE

## 2019-04-08 VITALS
RESPIRATION RATE: 14 BRPM | HEART RATE: 54 BPM | WEIGHT: 130 LBS | DIASTOLIC BLOOD PRESSURE: 84 MMHG | TEMPERATURE: 97.6 F | SYSTOLIC BLOOD PRESSURE: 130 MMHG | BODY MASS INDEX: 25.18 KG/M2 | OXYGEN SATURATION: 98 %

## 2019-04-08 DIAGNOSIS — I25.10 CORONARY ARTERY DISEASE INVOLVING NATIVE CORONARY ARTERY OF NATIVE HEART WITHOUT ANGINA PECTORIS: ICD-10-CM

## 2019-04-08 DIAGNOSIS — L03.031 CELLULITIS OF TOE OF RIGHT FOOT: Primary | ICD-10-CM

## 2019-04-08 DIAGNOSIS — R09.89 OTHER SPECIFIED SYMPTOMS AND SIGNS INVOLVING THE CIRCULATORY AND RESPIRATORY SYSTEMS: ICD-10-CM

## 2019-04-08 DIAGNOSIS — G60.9 IDIOPATHIC PERIPHERAL NEUROPATHY: ICD-10-CM

## 2019-04-08 DIAGNOSIS — R20.2 PARESTHESIA OF LOWER EXTREMITY: ICD-10-CM

## 2019-04-08 PROCEDURE — 99213 OFFICE O/P EST LOW 20 MIN: CPT | Performed by: NURSE PRACTITIONER

## 2019-04-08 RX ORDER — CEPHALEXIN 500 MG/1
500 CAPSULE ORAL 3 TIMES DAILY
Qty: 21 CAPSULE | Refills: 0 | Status: SHIPPED | OUTPATIENT
Start: 2019-04-08 | End: 2019-05-26

## 2019-04-08 NOTE — PROGRESS NOTES
"  SUBJECTIVE:                                                    Daya Ignacio is a 88 year old female who presents to clinic today for the following health issues:    Derm concern      Duration: 3 days    Description  Location: R big toe  Itching: no    Intensity:  moderate    Accompanying signs and symptoms: red and swollen    History (similar episodes/previous evaluation): None    Precipitating or alleviating factors:  New exposures:  None  Recent travel: no      Therapies tried and outcome: neosporin    Right great toe became red, swollen and tender 3 days ago after hitting toe on corner of couch. Applied Neosporin for relief. Denies history of ingrown toenails.     BLE pain Worse in the morning and during the night, decreases as the day goes on. Able to complete daily activities. During the night she has \"pins and needles\" from her knees down; keeping her up at night. Taking gabapentin 800 mg in AM and in the afternoon, 1,600 mg at bedtime without relief.     Problem list and histories reviewed & adjusted, as indicated.  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     Hypertension goal BP (blood pressure) < 140/90     Chronic pain     NSTEMI (non-ST elevated myocardial infarction) (H)     Urinary tract infection without hematuria     Hip pain     Trochanteric fracture of left femur (H)     Diarrhea     Past Surgical History:   Procedure Laterality Date     C NONSPECIFIC PROCEDURE      lt hip pain     ORTHOPEDIC SURGERY         Social History     Tobacco Use     Smoking status: Never Smoker     Smokeless tobacco: Never Used   Substance Use Topics     Alcohol use: No     Family History   Problem Relation Age of Onset     " Alzheimer Disease Mother         mild     Cardiovascular Father         heart attack     Neurologic Disorder Brother 83        Parkinson's         Current Outpatient Medications   Medication Sig Dispense Refill     acetaminophen-codeine (TYLENOL WITH CODEINE #3) 300-30 MG per tablet Take 1 tablet by mouth every 8 hours as needed for severe pain 30 tablet 0     amLODIPine (NORVASC) 10 MG tablet Take 1 tablet (10 mg) by mouth daily 90 tablet 1     aspirin EC 81 MG EC tablet Take 1 tablet (81 mg) by mouth daily 30 tablet 3     Calcium Carbonate (CALCIUM 600 PO) Take 1 tablet by mouth daily       Cholecalciferol (VITAMIN D3) 2000 UNITS CAPS Take 2,000 Units by mouth daily (with dinner)       cloNIDine (CATAPRES) 0.1 MG tablet TAKE ONE TABLET BY MOUTH TWO TIMES A  tablet 0     cyanocobalamin (VITAMIN  B-12) 1000 MCG tablet Take 1,000 mcg by mouth every morning       estradiol (ESTRACE) 0.1 MG/GM vaginal cream Place 2 g vaginally three times a week paraben-free 6 g 3     gabapentin (NEURONTIN) 800 MG tablet TAKE ONE TABLET BY MOUTH in the morning, 1 tablet in the afternoon and 2 tablets at bedtime 360 tablet 1     ibuprofen (ADVIL) 200 MG tablet Take 200 mg by mouth every 4 hours as needed for mild pain or pain       isosorbide dinitrate (ISORDIL) 10 MG tablet Take 1 tablet (10 mg) by mouth 3 times daily 270 tablet 3     lisinopril (PRINIVIL/ZESTRIL) 40 MG tablet Take 1 tablet (40 mg) by mouth daily 90 tablet 1     metoprolol succinate ER (TOPROL-XL) 100 MG 24 hr tablet Take 1 tablet (100 mg) by mouth daily 90 tablet 1     Multiple Vitamins-Minerals (PRESERVISION AREDS) CAPS Take 1 capsule by mouth 2 times daily       sertraline (ZOLOFT) 100 MG tablet Take 1 tablet (100 mg) by mouth daily 90 tablet 1     Allergies   Allergen Reactions     Atorvastatin Muscle Pain (Myalgia)     Macrobid [Nitrofurantoin Anhydrous] Other (See Comments)     Body aches     Sulfa Drugs        ROS:  Skin: See HPI   Constitutional,   cardiovascular, pulmonary,  neuro, endocrine and psych systems are negative, except as otherwise noted.    This document serves as a record of the services and decisions personally performed and made by Susan Haase, CNP. It was created on her behalf by Jenny Humphreys, a trained medical scribe. The creation of this document is based on the provider's statements to the medical scribe.  Jenny Humphreys 2:49 PM April 8, 2019  OBJECTIVE:   /84 (BP Location: Right arm, Patient Position: Chair, Cuff Size: Adult Regular)   Pulse 54   Temp 97.6  F (36.4  C) (Oral)   Resp 14   Wt 59 kg (130 lb)   SpO2 98%   BMI 25.18 kg/m    Body mass index is 25.18 kg/m .  GENERAL: healthy, alert and no distress  SKIN:  Right great toenail, medial aspect with purulent drainage and redness along edge of nail, tender to touch.   PSYCH: mentation appears normal, affect normal/bright    ASSESSMENT/PLAN:   Daya was seen today for derm problem.    Diagnoses and all orders for this visit:    Cellulitis of toe of right foot due to ingrown toenail: Advised to soak right foot 2-3 times per day, continue to apply antibiotic  ointment daily. Take keflex as prescribed. Will refer to podiatry for further evaluation.   -     cephALEXin (KEFLEX) 500 MG capsule; Take 1 capsule (500 mg) by mouth 3 times daily    BLE pain:  Is taking gabapentin total of 3,200 mg per day.  Will collaborate with Marcy Saores and get back to patient on next steps.   4/9/2019:  Spoke with Marcy, will obtain TEENA US and have Daya follow up with an appointment (called Daya with the plan, also reports toe is better with decreased redness and pain).  Follow up in next 1-2 weeks, sooner as needed.   The information in this document, created by the medical scribe for me, accurately reflects the services I personally performed and the decisions made by me. I have reviewed and approved this document for accuracy prior to leaving the patient care area.  April 8, 2019 2:53  PM  Susan Haase, APRN [facility]

## 2019-04-16 DIAGNOSIS — M70.70 BURSITIS OF HIP, UNSPECIFIED BURSA, UNSPECIFIED LATERALITY: ICD-10-CM

## 2019-04-16 NOTE — TELEPHONE ENCOUNTER
Controlled Substance Refill Request for Tylenol #3  Problem List Complete:  Yes  Patient is followed by Marcy Soares PA-C, FARZANA for ongoing prescription of pain medication.  All refills should only be approved by this provider, or covering partner.     Medication(s): acetaminophen-codeine (TYLENOL/CODEINE #3) 300-30 MG per tablet   Maximum quantity per month: 270  Clinic visit frequency required: Q 6  months      Controlled substance agreement:9/7/2018         Encounter-Level CSA:      There are no encounter-level csa.          Pain Clinic evaluation in the past: No     DIRE Total Score(s):  No flowsheet data found.     Last [facility] website verification:  done on 4.3.19    Last Written Prescription Date:  04/03/29  Last Fill Quantity: 30,   # refills: 0  [facility] Office Visit with [facility] [place] provider: Susan Haase    Future Office visit:     Controlled substance agreement:   Encounter-Level CSA - 09/07/2018:    Controlled Substance Agreement - Scan on 9/12/2018 11:46 AM: CONTROLLED SUBSTANCE AGREEMENT (below)       Patient-Level CSA:    There are no patient-level csa.         Last Urine Drug Screen: No results found for: CDAUT, No results found for: COMDAT, No results found for: THC13, PCP13, COC13, MAMP13, OPI13, AMP13, BZO13, TCA13, MTD13, BAR13, OXY13, PPX13, BUP13     Processing:  Staff will hand deliver Rx to on-site pharmacy    [email or web address]   checked in past 3 months?  Yes 04/03/19

## 2019-05-22 DIAGNOSIS — I10 ESSENTIAL HYPERTENSION: ICD-10-CM

## 2019-05-22 NOTE — TELEPHONE ENCOUNTER
"Requested Prescriptions   Pending Prescriptions Disp Refills     cloNIDine (CATAPRES) 0.1 MG tablet [Pharmacy Med Name: CLONIDINE HCL 0.1MG TABS] 180 tablet 0     Sig: TAKE ONE TABLET BY MOUTH TWO TIMES A DAY   Last Written Prescription Date:  2/9/19  Last Fill Quantity: 180,  # refills: 0   Last Office Visit: 4/8/2019 Haase      Return in about 2 weeks (around 4/22/2019).   Future Office Visit:         Central Acting Antiadrenergic Agents Passed - 5/22/2019 12:48 PM        Passed - Blood pressure under 140/90 in past 12 months     BP Readings from Last 3 Encounters:   04/08/19 130/84   02/19/19 129/48   09/07/18 111/61                 Passed - Patient is 6 years of age or older        Passed - Recent (12 mo) or future (30 days) visit within the authorizing provider's specialty     Patient had office visit in the last 12 months or has a visit in the next 30 days with authorizing provider or within the authorizing provider's specialty.  See \"Patient Info\" tab in inbasket, or \"Choose Columns\" in Meds & Orders section of the refill encounter.              Passed - Medication is active on med list        Passed - Patient not pregnant        Passed - Normal serum creatinine on file within past 12 months     Recent Labs   Lab Test 02/19/19  1022   CR 0.75             Passed - No positive pregnancy test on file in past 12 months        "

## 2019-05-23 RX ORDER — CLONIDINE HYDROCHLORIDE 0.1 MG/1
TABLET ORAL
Qty: 180 TABLET | Refills: 0 | Status: SHIPPED | OUTPATIENT
Start: 2019-05-23 | End: 2020-05-06

## 2019-05-23 NOTE — TELEPHONE ENCOUNTER
Prescription approved per [facility] Refill Protocol.  Teresa Dillard RN

## 2019-05-26 ENCOUNTER — HOSPITAL ENCOUNTER (INPATIENT)
Facility: CLINIC | Age: 84
LOS: 5 days | Discharge: HOME HEALTH | DRG: 690 | End: 2019-05-31
Attending: EMERGENCY MEDICINE | Admitting: INTERNAL MEDICINE
Payer: MEDICARE

## 2019-05-26 ENCOUNTER — APPOINTMENT (OUTPATIENT)
Dept: CT IMAGING | Facility: CLINIC | Age: 84
DRG: 690 | End: 2019-05-26
Attending: EMERGENCY MEDICINE
Payer: MEDICARE

## 2019-05-26 DIAGNOSIS — R29.6 FALLS FREQUENTLY: ICD-10-CM

## 2019-05-26 DIAGNOSIS — R19.7 VOMITING AND DIARRHEA: ICD-10-CM

## 2019-05-26 DIAGNOSIS — N17.9 ACUTE KIDNEY INJURY (H): ICD-10-CM

## 2019-05-26 DIAGNOSIS — M62.81 MUSCLE WEAKNESS (GENERALIZED): ICD-10-CM

## 2019-05-26 DIAGNOSIS — M70.70 BURSITIS OF HIP, UNSPECIFIED BURSA, UNSPECIFIED LATERALITY: ICD-10-CM

## 2019-05-26 DIAGNOSIS — R11.10 VOMITING AND DIARRHEA: ICD-10-CM

## 2019-05-26 DIAGNOSIS — N30.00 ACUTE CYSTITIS WITHOUT HEMATURIA: ICD-10-CM

## 2019-05-26 DIAGNOSIS — R79.89 ELEVATED TROPONIN: ICD-10-CM

## 2019-05-26 DIAGNOSIS — N39.0 URINARY TRACT INFECTION WITHOUT HEMATURIA, SITE UNSPECIFIED: Primary | ICD-10-CM
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
## 2019-05-26 PROBLEM — E87.6 HYPOKALEMIA: Status: ACTIVE | Noted: 2019-05-26

## 2019-05-26 LAB
ALBUMIN SERPL-MCNC: 3.5 G/DL (ref 3.4–5)
ALBUMIN UR-MCNC: 20 MG/DL
ALP SERPL-CCNC: 83 U/L (ref 40–150)
ALT SERPL W P-5'-P-CCNC: 17 U/L (ref 0–50)
ANION GAP SERPL CALCULATED.3IONS-SCNC: 8 MMOL/L (ref 3–14)
APPEARANCE UR: ABNORMAL
AST SERPL W P-5'-P-CCNC: 24 U/L (ref 0–45)
BACTERIA #/AREA URNS HPF: ABNORMAL /HPF
BASOPHILS # BLD AUTO: 0.1 10E9/L (ref 0–0.2)
BASOPHILS NFR BLD AUTO: 0.3 %
BILIRUB DIRECT SERPL-MCNC: 0.1 MG/DL (ref 0–0.2)
BILIRUB SERPL-MCNC: 0.6 MG/DL (ref 0.2–1.3)
BILIRUB UR QL STRIP: NEGATIVE
BUN SERPL-MCNC: 47 MG/DL (ref 7–30)
CALCIUM SERPL-MCNC: 8.8 MG/DL (ref 8.5–10.1)
CHLORIDE SERPL-SCNC: 106 MMOL/L (ref 94–109)
CK SERPL-CCNC: 264 U/L (ref 30–225)
CO2 SERPL-SCNC: 27 MMOL/L (ref 20–32)
COLOR UR AUTO: YELLOW
CREAT SERPL-MCNC: 1.11 MG/DL (ref 0.52–1.04)
DIFFERENTIAL METHOD BLD: ABNORMAL
EOSINOPHIL # BLD AUTO: 0.2 10E9/L (ref 0–0.7)
EOSINOPHIL NFR BLD AUTO: 1.2 %
ERYTHROCYTE [DISTWIDTH] IN BLOOD BY AUTOMATED COUNT: 13.6 % (ref 10–15)
GFR SERPL CREATININE-BSD FRML MDRD: 44 ML/MIN/{1.73_M2}
GLUCOSE SERPL-MCNC: 94 MG/DL (ref 70–99)
GLUCOSE UR STRIP-MCNC: NEGATIVE MG/DL
HCT VFR BLD AUTO: 39.4 % (ref 35–47)
HGB BLD-MCNC: 13.1 G/DL (ref 11.7–15.7)
HGB UR QL STRIP: NEGATIVE
IMM GRANULOCYTES # BLD: 0.1 10E9/L (ref 0–0.4)
IMM GRANULOCYTES NFR BLD: 0.6 %
INTERPRETATION ECG - MUSE: NORMAL
KETONES UR STRIP-MCNC: ABNORMAL MG/DL
LACTATE BLD-SCNC: 0.8 MMOL/L (ref 0.7–2)
LEUKOCYTE ESTERASE UR QL STRIP: ABNORMAL
LIPASE SERPL-CCNC: 41 U/L (ref 73–393)
LYMPHOCYTES # BLD AUTO: 1.8 10E9/L (ref 0.8–5.3)
LYMPHOCYTES NFR BLD AUTO: 10.4 %
MCH RBC QN AUTO: 30.9 PG (ref 26.5–33)
MCHC RBC AUTO-ENTMCNC: 33.2 G/DL (ref 31.5–36.5)
MCV RBC AUTO: 93 FL (ref 78–100)
MONOCYTES # BLD AUTO: 1 10E9/L (ref 0–1.3)
MONOCYTES NFR BLD AUTO: 5.6 %
MUCOUS THREADS #/AREA URNS LPF: PRESENT /LPF
NEUTROPHILS # BLD AUTO: 14.4 10E9/L (ref 1.6–8.3)
NEUTROPHILS NFR BLD AUTO: 81.9 %
NITRATE UR QL: POSITIVE
NRBC # BLD AUTO: 0 10*3/UL
NRBC BLD AUTO-RTO: 0 /100
PH UR STRIP: 5.5 PH (ref 5–7)
PLATELET # BLD AUTO: 199 10E9/L (ref 150–450)
POTASSIUM SERPL-SCNC: 3.1 MMOL/L (ref 3.4–5.3)
POTASSIUM SERPL-SCNC: 3.3 MMOL/L (ref 3.4–5.3)
PROT SERPL-MCNC: 6.9 G/DL (ref 6.8–8.8)
RBC # BLD AUTO: 4.24 10E12/L (ref 3.8–5.2)
RBC #/AREA URNS AUTO: 2 /HPF (ref 0–2)
SODIUM SERPL-SCNC: 141 MMOL/L (ref 133–144)
SOURCE: ABNORMAL
SP GR UR STRIP: 1.02 (ref 1–1.03)
SQUAMOUS #/AREA URNS AUTO: 1 /HPF (ref 0–1)
TROPONIN I SERPL-MCNC: 0.05 UG/L (ref 0–0.04)
TROPONIN I SERPL-MCNC: 0.06 UG/L (ref 0–0.04)
UROBILINOGEN UR STRIP-MCNC: NORMAL MG/DL (ref 0–2)
WBC # BLD AUTO: 17.6 10E9/L (ref 4–11)
WBC #/AREA URNS AUTO: 16 /HPF (ref 0–5)

## 2019-05-26 PROCEDURE — 36415 COLL VENOUS BLD VENIPUNCTURE: CPT | Performed by: EMERGENCY MEDICINE

## 2019-05-26 PROCEDURE — 83605 ASSAY OF LACTIC ACID: CPT | Performed by: EMERGENCY MEDICINE

## 2019-05-26 PROCEDURE — 36415 COLL VENOUS BLD VENIPUNCTURE: CPT | Performed by: INTERNAL MEDICINE

## 2019-05-26 PROCEDURE — 87186 SC STD MICRODIL/AGAR DIL: CPT | Performed by: EMERGENCY MEDICINE

## 2019-05-26 PROCEDURE — 87086 URINE CULTURE/COLONY COUNT: CPT | Performed by: EMERGENCY MEDICINE

## 2019-05-26 PROCEDURE — 84132 ASSAY OF SERUM POTASSIUM: CPT | Performed by: INTERNAL MEDICINE

## 2019-05-26 PROCEDURE — 81001 URINALYSIS AUTO W/SCOPE: CPT | Performed by: EMERGENCY MEDICINE

## 2019-05-26 PROCEDURE — 25000128 H RX IP 250 OP 636: Performed by: EMERGENCY MEDICINE

## 2019-05-26 PROCEDURE — 87088 URINE BACTERIA CULTURE: CPT | Performed by: EMERGENCY MEDICINE

## 2019-05-26 PROCEDURE — A9270 NON-COVERED ITEM OR SERVICE: HCPCS | Performed by: INTERNAL MEDICINE

## 2019-05-26 PROCEDURE — 82550 ASSAY OF CK (CPK): CPT | Performed by: EMERGENCY MEDICINE

## 2019-05-26 PROCEDURE — 93005 ELECTROCARDIOGRAM TRACING: CPT

## 2019-05-26 PROCEDURE — 25000132 ZZH RX MED GY IP 250 OP 250 PS 637: Performed by: INTERNAL MEDICINE

## 2019-05-26 PROCEDURE — 83690 ASSAY OF LIPASE: CPT | Performed by: EMERGENCY MEDICINE

## 2019-05-26 PROCEDURE — 70450 CT HEAD/BRAIN W/O DYE: CPT

## 2019-05-26 PROCEDURE — 87040 BLOOD CULTURE FOR BACTERIA: CPT | Performed by: EMERGENCY MEDICINE

## 2019-05-26 PROCEDURE — 84484 ASSAY OF TROPONIN QUANT: CPT | Performed by: INTERNAL MEDICINE

## 2019-05-26 PROCEDURE — 99222 1ST HOSP IP/OBS MODERATE 55: CPT | Mod: AI | Performed by: INTERNAL MEDICINE

## 2019-05-26 PROCEDURE — 80048 BASIC METABOLIC PNL TOTAL CA: CPT | Performed by: EMERGENCY MEDICINE

## 2019-05-26 PROCEDURE — 96361 HYDRATE IV INFUSION ADD-ON: CPT

## 2019-05-26 PROCEDURE — 85025 COMPLETE CBC W/AUTO DIFF WBC: CPT | Performed by: EMERGENCY MEDICINE

## 2019-05-26 PROCEDURE — 84484 ASSAY OF TROPONIN QUANT: CPT | Performed by: EMERGENCY MEDICINE

## 2019-05-26 PROCEDURE — 12000000 ZZH R&B MED SURG/OB

## 2019-05-26 PROCEDURE — 99285 EMERGENCY DEPT VISIT HI MDM: CPT | Mod: 25

## 2019-05-26 PROCEDURE — 80076 HEPATIC FUNCTION PANEL: CPT | Performed by: EMERGENCY MEDICINE

## 2019-05-26 PROCEDURE — 25000128 H RX IP 250 OP 636: Performed by: INTERNAL MEDICINE

## 2019-05-26 PROCEDURE — 96365 THER/PROPH/DIAG IV INF INIT: CPT

## 2019-05-26 RX ORDER — METOPROLOL SUCCINATE 100 MG/1
100 TABLET, EXTENDED RELEASE ORAL DAILY
Status: DISCONTINUED | OUTPATIENT
Start: 2019-05-27 | End: 2019-05-31 | Stop reason: HOSPADM

## 2019-05-26 RX ORDER — CEFTRIAXONE 1 G/1
1 INJECTION, POWDER, FOR SOLUTION INTRAMUSCULAR; INTRAVENOUS ONCE
Status: COMPLETED | OUTPATIENT
Start: 2019-05-26 | End: 2019-05-26

## 2019-05-26 RX ORDER — SERTRALINE HYDROCHLORIDE 100 MG/1
100 TABLET, FILM COATED ORAL DAILY
Status: DISCONTINUED | OUTPATIENT
Start: 2019-05-27 | End: 2019-05-31 | Stop reason: HOSPADM

## 2019-05-26 RX ORDER — AMOXICILLIN 250 MG
1 CAPSULE ORAL 2 TIMES DAILY PRN
Status: DISCONTINUED | OUTPATIENT
Start: 2019-05-26 | End: 2019-05-31 | Stop reason: HOSPADM

## 2019-05-26 RX ORDER — AMOXICILLIN 250 MG
2 CAPSULE ORAL 2 TIMES DAILY PRN
Status: DISCONTINUED | OUTPATIENT
Start: 2019-05-26 | End: 2019-05-31 | Stop reason: HOSPADM

## 2019-05-26 RX ORDER — BISACODYL 10 MG
10 SUPPOSITORY, RECTAL RECTAL DAILY PRN
Status: DISCONTINUED | OUTPATIENT
Start: 2019-05-26 | End: 2019-05-31 | Stop reason: HOSPADM

## 2019-05-26 RX ORDER — ASPIRIN 81 MG/1
81 TABLET ORAL DAILY
Status: DISCONTINUED | OUTPATIENT
Start: 2019-05-27 | End: 2019-05-31 | Stop reason: HOSPADM

## 2019-05-26 RX ORDER — GABAPENTIN 400 MG/1
800 CAPSULE ORAL 2 TIMES DAILY
Status: DISCONTINUED | OUTPATIENT
Start: 2019-05-27 | End: 2019-05-31 | Stop reason: HOSPADM

## 2019-05-26 RX ORDER — POTASSIUM CHLORIDE 7.45 MG/ML
10 INJECTION INTRAVENOUS
Status: DISCONTINUED | OUTPATIENT
Start: 2019-05-26 | End: 2019-05-31 | Stop reason: HOSPADM

## 2019-05-26 RX ORDER — CLONIDINE HYDROCHLORIDE 0.1 MG/1
0.1 TABLET ORAL 2 TIMES DAILY
Status: DISCONTINUED | OUTPATIENT
Start: 2019-05-26 | End: 2019-05-31 | Stop reason: HOSPADM

## 2019-05-26 RX ORDER — CEFTRIAXONE 1 G/1
1 INJECTION, POWDER, FOR SOLUTION INTRAMUSCULAR; INTRAVENOUS EVERY 24 HOURS
Status: DISCONTINUED | OUTPATIENT
Start: 2019-05-27 | End: 2019-05-28

## 2019-05-26 RX ORDER — POTASSIUM CHLORIDE 1.5 G/1.58G
20-40 POWDER, FOR SOLUTION ORAL
Status: DISCONTINUED | OUTPATIENT
Start: 2019-05-26 | End: 2019-05-31 | Stop reason: HOSPADM

## 2019-05-26 RX ORDER — ONDANSETRON 2 MG/ML
4 INJECTION INTRAMUSCULAR; INTRAVENOUS EVERY 6 HOURS PRN
Status: DISCONTINUED | OUTPATIENT
Start: 2019-05-26 | End: 2019-05-31 | Stop reason: HOSPADM

## 2019-05-26 RX ORDER — PROCHLORPERAZINE 25 MG
12.5 SUPPOSITORY, RECTAL RECTAL EVERY 12 HOURS PRN
Status: DISCONTINUED | OUTPATIENT
Start: 2019-05-26 | End: 2019-05-31 | Stop reason: HOSPADM

## 2019-05-26 RX ORDER — GABAPENTIN 800 MG/1
1600 TABLET ORAL AT BEDTIME
Status: ON HOLD | COMMUNITY
End: 2019-07-17

## 2019-05-26 RX ORDER — AMLODIPINE BESYLATE 5 MG/1
10 TABLET ORAL DAILY
Status: DISCONTINUED | OUTPATIENT
Start: 2019-05-27 | End: 2019-05-31 | Stop reason: HOSPADM

## 2019-05-26 RX ORDER — NALOXONE HYDROCHLORIDE 0.4 MG/ML
.1-.4 INJECTION, SOLUTION INTRAMUSCULAR; INTRAVENOUS; SUBCUTANEOUS
Status: DISCONTINUED | OUTPATIENT
Start: 2019-05-26 | End: 2019-05-31 | Stop reason: HOSPADM

## 2019-05-26 RX ORDER — POTASSIUM CHLORIDE 1500 MG/1
20-40 TABLET, EXTENDED RELEASE ORAL
Status: DISCONTINUED | OUTPATIENT
Start: 2019-05-26 | End: 2019-05-31 | Stop reason: HOSPADM

## 2019-05-26 RX ORDER — GABAPENTIN 400 MG/1
1600 CAPSULE ORAL AT BEDTIME
Status: DISCONTINUED | OUTPATIENT
Start: 2019-05-26 | End: 2019-05-31 | Stop reason: HOSPADM

## 2019-05-26 RX ORDER — GABAPENTIN 800 MG/1
800 TABLET ORAL 2 TIMES DAILY
Status: ON HOLD | COMMUNITY
End: 2019-07-17

## 2019-05-26 RX ORDER — LISINOPRIL 10 MG/1
40 TABLET ORAL DAILY
Status: DISCONTINUED | OUTPATIENT
Start: 2019-05-27 | End: 2019-05-31 | Stop reason: HOSPADM

## 2019-05-26 RX ORDER — POTASSIUM CHLORIDE 29.8 MG/ML
20 INJECTION INTRAVENOUS
Status: DISCONTINUED | OUTPATIENT
Start: 2019-05-26 | End: 2019-05-26

## 2019-05-26 RX ORDER — ACETAMINOPHEN 325 MG/1
650 TABLET ORAL EVERY 4 HOURS PRN
Status: DISCONTINUED | OUTPATIENT
Start: 2019-05-26 | End: 2019-05-31 | Stop reason: HOSPADM

## 2019-05-26 RX ORDER — ISOSORBIDE DINITRATE 10 MG/1
10 TABLET ORAL 3 TIMES DAILY
Status: DISCONTINUED | OUTPATIENT
Start: 2019-05-26 | End: 2019-05-31 | Stop reason: HOSPADM

## 2019-05-26 RX ORDER — POTASSIUM CL/LIDO/0.9 % NACL 10MEQ/0.1L
10 INTRAVENOUS SOLUTION, PIGGYBACK (ML) INTRAVENOUS
Status: DISCONTINUED | OUTPATIENT
Start: 2019-05-26 | End: 2019-05-31 | Stop reason: HOSPADM

## 2019-05-26 RX ORDER — SODIUM CHLORIDE AND POTASSIUM CHLORIDE 150; 900 MG/100ML; MG/100ML
INJECTION, SOLUTION INTRAVENOUS CONTINUOUS
Status: DISPENSED | OUTPATIENT
Start: 2019-05-26 | End: 2019-05-27

## 2019-05-26 RX ORDER — PROCHLORPERAZINE MALEATE 5 MG
5 TABLET ORAL EVERY 6 HOURS PRN
Status: DISCONTINUED | OUTPATIENT
Start: 2019-05-26 | End: 2019-05-31 | Stop reason: HOSPADM

## 2019-05-26 RX ORDER — ASCORBIC ACID 500 MG
500 TABLET ORAL DAILY
COMMUNITY
End: 2024-05-02

## 2019-05-26 RX ORDER — ONDANSETRON 4 MG/1
4 TABLET, ORALLY DISINTEGRATING ORAL EVERY 6 HOURS PRN
Status: DISCONTINUED | OUTPATIENT
Start: 2019-05-26 | End: 2019-05-31 | Stop reason: HOSPADM

## 2019-05-26 RX ADMIN — SODIUM CHLORIDE 1000 ML: 9 INJECTION, SOLUTION INTRAVENOUS at 16:04

## 2019-05-26 RX ADMIN — SODIUM CHLORIDE 1000 ML: 9 INJECTION, SOLUTION INTRAVENOUS at 18:53

## 2019-05-26 RX ADMIN — CLONIDINE HYDROCHLORIDE 0.1 MG: 0.1 TABLET ORAL at 20:38

## 2019-05-26 RX ADMIN — GABAPENTIN 1600 MG: 400 CAPSULE ORAL at 21:17

## 2019-05-26 RX ADMIN — ACETAMINOPHEN AND CODEINE PHOSPHATE 1 TABLET: 300; 30 TABLET ORAL at 21:16

## 2019-05-26 RX ADMIN — POTASSIUM CHLORIDE AND SODIUM CHLORIDE: 900; 150 INJECTION, SOLUTION INTRAVENOUS at 20:37

## 2019-05-26 RX ADMIN — POTASSIUM CHLORIDE 40 MEQ: 1500 TABLET, EXTENDED RELEASE ORAL at 22:32

## 2019-05-26 RX ADMIN — ISOSORBIDE DINITRATE 10 MG: 10 TABLET ORAL at 20:38

## 2019-05-26 RX ADMIN — CEFTRIAXONE SODIUM 1 G: 1 INJECTION, POWDER, FOR SOLUTION INTRAMUSCULAR; INTRAVENOUS at 18:48

## 2019-05-26 ASSESSMENT — ACTIVITIES OF DAILY LIVING (ADL)
TOILETING: 1-->ASSISTIVE EQUIPMENT
RETIRED_EATING: 0-->INDEPENDENT
COGNITION: 0 - NO COGNITION ISSUES REPORTED
RETIRED_COMMUNICATION: 0-->UNDERSTANDS/COMMUNICATES WITHOUT DIFFICULTY
NUMBER_OF_TIMES_PATIENT_HAS_FALLEN_WITHIN_LAST_SIX_MONTHS: 2
DRESS: 0-->INDEPENDENT
WHICH_OF_THE_ABOVE_FUNCTIONAL_RISKS_HAD_A_RECENT_ONSET_OR_CHANGE?: AMBULATION;TRANSFERRING
SWALLOWING: 0-->SWALLOWS FOODS/LIQUIDS WITHOUT DIFFICULTY
AMBULATION: 1-->ASSISTIVE EQUIPMENT
BATHING: 1-->ASSISTIVE EQUIPMENT
FALL_HISTORY_WITHIN_LAST_SIX_MONTHS: YES
TRANSFERRING: 1-->ASSISTIVE EQUIPMENT

## 2019-05-26 ASSESSMENT — ENCOUNTER SYMPTOMS
CHEST TIGHTNESS: 0
NAUSEA: 1
CHILLS: 0
DIARRHEA: 1
ABDOMINAL PAIN: 0
SHORTNESS OF BREATH: 0
FEVER: 0
VOMITING: 1
MYALGIAS: 1

## 2019-05-26 ASSESSMENT — MIFFLIN-ST. JEOR: SCORE: 965.21

## 2019-05-26 NOTE — ED PROVIDER NOTES
History     Chief Complaint:  Fall      HPI   Daya Ignacio is a 88 year old female who presents to the emergency department today for evaluation of fall. The patient reports for the past three days she has been having episodes of nausea and vomiting. She had one episodes of diarrhea. Yesterday she was feeling weak, and was having trouble ambulating. She decided to use her walker which is unusual for her, and she fell. Today she had another fall, and was on the floor for three hours before her son came over and found her. She reports she didn't hit her head. Due to these symptoms and falls she presented to the emergency department today. She says she isn't in any pain, but does have soreness in her legs. Her son reports the past few days she has been able to sleep through the night and seemed to be improving. Additionally, at baseline she is able to ambulate on her own and only takes her cane with her on outings. She lives at home by herself in a senior living center. She denies fever, chest pain, shortness of breath, coughing, abdominal pain, urinary symptoms.  No history of recent antibiotics.        Allergies:  Atorvastatin  Macrobid [Nitrofurantoin Anhydrous]  Sulfa Drugs        Medications:    acetaminophen-codeine (TYLENOL #3) 300-30 MG tablet  amLODIPine (NORVASC) 10 MG tablet  aspirin EC 81 MG EC tablet  Calcium Carbonate (CALCIUM 600 PO)  Cholecalciferol (VITAMIN D3) 2000 UNITS CAPS  cloNIDine (CATAPRES) 0.1 MG tablet  cyanocobalamin (VITAMIN  B-12) 1000 MCG tablet  estradiol (ESTRACE) 0.1 MG/GM vaginal cream  gabapentin (NEURONTIN) 800 MG tablet  isosorbide dinitrate (ISORDIL) 10 MG tablet  lisinopril (PRINIVIL/ZESTRIL) 40 MG tablet  metoprolol succinate ER (TOPROL-XL) 100 MG 24 hr tablet  Multiple Vitamins-Minerals (PRESERVISION AREDS) CAPS  sertraline (ZOLOFT) 100 MG tablet  cephALEXin (KEFLEX) 500 MG capsule      Past Medical History:    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  Closed fracture of  "part of neck of femur  Closed fracture of vertebral column without mention of spinal cord injury  Hypertension  Macular degeneration      Past Surgical History:    C nonspecific procedure  Orthopedic surgery      Family History:    Alzheimer disease  Heart attack  Parkinson's      Social History:  The patient was accompanied to the ED by EMS and son.  Smoking Status: Never Smoker  Smokeless Tobacco: Never Used  Alcohol Use: No   Marital Status:  Single [1]       Review of Systems   Constitutional: Negative for chills and fever.   Respiratory: Negative for chest tightness and shortness of breath.    Cardiovascular: Negative for chest pain.   Gastrointestinal: Positive for diarrhea, nausea and vomiting. Negative for abdominal pain.   Genitourinary: Negative.    Musculoskeletal: Positive for myalgias (legs).   All other systems reviewed and are negative.        Physical Exam     Vitals:    05/26/19 1900 05/26/19 1930 05/26/19 2005 05/26/19 2018   BP:  120/61 152/60    Pulse:       Resp: 14 13 16    Temp:   96  F (35.6  C)    TempSrc:   Oral    SpO2:   100%    Weight:    58.2 kg (128 lb 4.8 oz)   Height:    1.575 m (5' 2\")           Physical Exam  General: Alert, no acute distress; nontoxic appearing  Neuro:  PERRL.  EOMI.  No focal deficits  HEENT:  Very dry mucous membranes.  Posterior oropharynx clear, no exudates.  Conjunctiva normal.  CV:  RRR, no m/r/g, skin warm and well perfused  Pulm:  CTAB, no wheezes/ronchi/rales.  No acute distress, breathing comfortably  GI:  Soft, nontender, nondistended.  No rebound or guarding.  Normal bowel sounds  MSK:  Moving all extremities.  No focal areas of edema, erythema, or tenderness  Skin:  WWP, no rashes, no lower extremity edema, skin color normal, no diaphoresis  Psych:  Well-appearing, normal affect, regular speech        Emergency Department Course     ECG:  Indication: fall  Completed at 1607.  Read at 1609.   Normal sinus rhythm. Left axis deviation. Pulmonary disease " pattern. Moderate voltage criteria for LVH, may be normal variant. Cannot rule out Septal infarct, age undetermined. Abnormal ECG.   Rate 74 bpm. DC interval 166. QRS duration 82. QT/QTc 428/475. P-R-T axes 86 -35 6.     Imaging:  Radiology findings were communicated with the patient who voiced understanding of the findings.    Head CT w/o contrast  IMPRESSION:     1. No evidence of acute intracranial hemorrhage, mass, or herniation.  2. Mild diffuse parenchymal volume loss and white matter changes  likely due to chronic microvascular ischemic disease.  Report per radiology      Laboratory:  Laboratory findings were communicated with the patient who voiced understanding of the findings.    CBC: WBC 17.6 (H), HGB 13.3,    BMP: Potassium 3.1 (L), BUN 47 (H), Creatinine 1.11 (H), GFR Estimate 44 (L) o/w WNL   CK total: 264 (H)   Lipase: 41 (L)   Troponin (Collected 1531): 0.055 (H)  Hepatic panel: WNL  Lactic Acid: 0.8     UA: Yellow and Slightly cloudy. Urineketon Trace, Protein Albumin Urine 20, Nitrite Positive, Leukocyte Esterase Urine Moderate, WBC/HPF 16 (H), Bacteria Many, Mucous Urine Present o/w WNL      Urine Culture Aerobic Bacterial: Pending       Interventions:  1604 NS Bolus 1,000mL IV        Emergency Department Course:  Nursing notes and vitals reviewed.  1549: I performed an exam of the patient as documented above.    The patient provided a urine sample here in the emergency department. This was sent for laboratory testing, findings above.    IV was inserted and blood was drawn for laboratory testing, results above.    The patient was sent for a Head CT while in the emergency department, results above.     EKG obtained in the ED, see results above.      1738 Patient rechecked and updated.      1819 I spoke with Dr. Mcmanus of the Hospitalist service  regarding patient's presentation, findings, and plan of care.     I discussed the treatment plan with the patient. They expressed understanding of  this plan and consented to admission. I discussed the patient with Dr. Mcmanus, who will admit the patient to a monitored bed for further evaluation and treatment.     I personally reviewed the laboratory and imaging results with the Patient and answered all related questions prior to admission.   Impression & Plan      Medical Decision Making:  Daya Ignacio is a 88 year old female presents the emergency department for evaluation of 3 days of nausea/vomiting and weakness with recurrent falls.  She has had one episode of diarrhea.  She otherwise denies any abdominal pain.  She had a fall today and printed up and was on the floor for about 3 hours.  Vital signs reviewed.  Exam is remarkable for dry mucous membranes concerning for severe dehydration.  Work-up shows leukocytosis to 17.  Urinalysis concerning for urinary tract infection.  CT head was obtained given fall which was unremarkable for any intracranial bleed.  Troponin slightly elevated and I suspect this is from severe dehydration and UTI.  Otherwise EKG shows no acute findings for ischemia or infarct and patient denies any chest pain.  Urine and blood culture sent.  Patient was given IV fluids and antibiotics.  I will bring the patient in for continued monitoring and care.  Patient admitted to medicine in stable condition.    Diagnosis:    ICD-10-CM    1. Acute cystitis without hematuria N30.00 Urine Culture Aerobic Bacterial     Blood culture     Blood culture     Potassium     Troponin I   2. Muscle weakness (generalized) M62.81    3. Falls frequently R29.6    4. Acute kidney injury (H) N17.9    5. Vomiting and diarrhea R11.10     R19.7    6. Elevated troponin R74.8        Disposition:  Admitted under the supervision of Dr. Mcmanus       Scribjayesh Disclosure:  I, Lisseth Tyson, am serving as a scribe at 3:52 PM on 5/26/2019 to document services personally performed by Min Chun MD based on my observations and the provider's statements to me.     Lisseth Tyson  5/26/2019   [facility] EMERGENCY DEPARTMENT       [facility], Min Petit MD  05/26/19 0057

## 2019-05-26 NOTE — ED TRIAGE NOTES
ABC's intact.  Alert and oriented x4.    Pt c/o 3 day history of n/v with multiple falls during that time.  Today fell and could not get up.  Pt states she was on the floor for about 3 hours before her son came over and found her.  Pt denies any injuries from the fall.  Denies LOC.

## 2019-05-26 NOTE — ED NOTES
[facility]  ED Nurse Handoff Report    Daya Ignacio is a 88 year old female   ED Chief complaint: Fall  . ED Diagnosis:   Final diagnoses:   Acute cystitis without hematuria   Muscle weakness (generalized)   Falls frequently   Acute kidney injury (H)   Vomiting and diarrhea   Elevated troponin     Allergies:   Allergies   Allergen Reactions     Atorvastatin Muscle Pain (Myalgia)     Macrobid [Nitrofurantoin Anhydrous] Other (See Comments)     Body aches     Sulfa Drugs        Code Status: Full Code  Activity level - Baseline/Home:  Independent with cane Activity Level - Current:   Stand with Assist of 2. Lift room needed: Yes. Bariatric: No   Needed: No   Isolation: No. Infection: Not Applicable.     Vital Signs:   Vitals:    05/26/19 1527 05/26/19 1600 05/26/19 1715 05/26/19 1745   BP: 131/76 121/56 142/58 131/53   Pulse: 75 73 82 75   Resp: 18 9 10 11   Temp: 98.2  F (36.8  C)      TempSrc: Oral      SpO2: 100% 100% 99% 95%       Cardiac Rhythm:  ,      Pain level: 0-10 Pain Scale: 2  Patient confused: No. Patient Falls Risk: Yes.   Elimination Status: Has voided   Patient Report - Initial Complaint: 2 day N/V.  Multiple falls.  Today fell and couldn't get up.. Focused Assessment: Weakness   Tests Performed: See Epic. Abnormal Results:   Labs Ordered and Resulted from Time of ED Arrival Up to the Time of Departure from the ED   CBC WITH PLATELETS DIFFERENTIAL - Abnormal; Notable for the following components:       Result Value    WBC 17.6 (*)     Absolute Neutrophil 14.4 (*)     All other components within normal limits   BASIC METABOLIC PANEL - Abnormal; Notable for the following components:    Potassium 3.1 (*)     Urea Nitrogen 47 (*)     Creatinine 1.11 (*)     GFR Estimate 44 (*)     GFR Estimate If Black 51 (*)     All other components within normal limits   ROUTINE UA WITH MICROSCOPIC - Abnormal; Notable for the following components:    Ketones Urine Trace (*)     Protein Albumin  Urine 20 (*)     Nitrite Urine Positive (*)     Leukocyte Esterase Urine Moderate (*)     WBC Urine 16 (*)     Bacteria Urine Many (*)     Mucous Urine Present (*)     All other components within normal limits   CK TOTAL - Abnormal; Notable for the following components:    CK Total 264 (*)     All other components within normal limits   LIPASE - Abnormal; Notable for the following components:    Lipase 41 (*)     All other components within normal limits   TROPONIN I - Abnormal; Notable for the following components:    Troponin I ES 0.055 (*)     All other components within normal limits   HEPATIC PANEL   LACTIC ACID WHOLE BLOOD   STRAIGHT CATH FOR URINE   BLOOD CULTURE   URINE CULTURE AEROBIC BACTERIAL   BLOOD CULTURE     Head CT w/o contrast   Final Result   IMPRESSION:      1. No evidence of acute intracranial hemorrhage, mass, or herniation.   2. Mild diffuse parenchymal volume loss and white matter changes   likely due to chronic microvascular ischemic disease.      IDA CARLSON MD        .   Treatments provided: Fluids, antibiotics  Family Comments: At bedside  OBS brochure/video discussed/provided to patient:  N/A  ED Medications:   Medications   0.9% sodium chloride BOLUS (has no administration in time range)   cefTRIAXone (ROCEPHIN) 1 g vial to attach to  mL bag for ADULTS or NS 50 mL bag for PEDS (has no administration in time range)   0.9% sodium chloride BOLUS (1,000 mLs Intravenous New Bag 5/26/19 1604)     Drips infusing:  No  For the majority of the shift, the patient's behavior Green. Interventions performed were None.     Severe Sepsis OR Septic Shock Diagnosis Present: No      ED Nurse Name/Phone Number: Abilio Kline,   6:15 PM  RECEIVING UNIT ED HANDOFF REVIEW    Above ED Nurse Handoff Report was reviewed: Yes  Reviewed by: Shakila Wong on May 26, 2019 at 7:37 PM

## 2019-05-27 ENCOUNTER — APPOINTMENT (OUTPATIENT)
Dept: GENERAL RADIOLOGY | Facility: CLINIC | Age: 84
DRG: 690 | End: 2019-05-27
Attending: INTERNAL MEDICINE
Payer: MEDICARE

## 2019-05-27 ENCOUNTER — APPOINTMENT (OUTPATIENT)
Dept: CARDIOLOGY | Facility: CLINIC | Age: 84
DRG: 690 | End: 2019-05-27
Attending: INTERNAL MEDICINE
Payer: MEDICARE

## 2019-05-27 LAB
ANION GAP SERPL CALCULATED.3IONS-SCNC: 5 MMOL/L (ref 3–14)
BUN SERPL-MCNC: 29 MG/DL (ref 7–30)
CALCIUM SERPL-MCNC: 8.4 MG/DL (ref 8.5–10.1)
CHLORIDE SERPL-SCNC: 114 MMOL/L (ref 94–109)
CO2 SERPL-SCNC: 24 MMOL/L (ref 20–32)
CREAT SERPL-MCNC: 0.68 MG/DL (ref 0.52–1.04)
ERYTHROCYTE [DISTWIDTH] IN BLOOD BY AUTOMATED COUNT: 13.8 % (ref 10–15)
GFR SERPL CREATININE-BSD FRML MDRD: 78 ML/MIN/{1.73_M2}
GLUCOSE SERPL-MCNC: 96 MG/DL (ref 70–99)
HCT VFR BLD AUTO: 35.7 % (ref 35–47)
HGB BLD-MCNC: 11.3 G/DL (ref 11.7–15.7)
MCH RBC QN AUTO: 30.6 PG (ref 26.5–33)
MCHC RBC AUTO-ENTMCNC: 31.7 G/DL (ref 31.5–36.5)
MCV RBC AUTO: 97 FL (ref 78–100)
PLATELET # BLD AUTO: 123 10E9/L (ref 150–450)
POTASSIUM SERPL-SCNC: 4.5 MMOL/L (ref 3.4–5.3)
RBC # BLD AUTO: 3.69 10E12/L (ref 3.8–5.2)
SODIUM SERPL-SCNC: 143 MMOL/L (ref 133–144)
TROPONIN I SERPL-MCNC: 0.05 UG/L (ref 0–0.04)
WBC # BLD AUTO: 8 10E9/L (ref 4–11)

## 2019-05-27 PROCEDURE — 93306 TTE W/DOPPLER COMPLETE: CPT

## 2019-05-27 PROCEDURE — 73562 X-RAY EXAM OF KNEE 3: CPT | Mod: RT

## 2019-05-27 PROCEDURE — 99232 SBSQ HOSP IP/OBS MODERATE 35: CPT | Performed by: INTERNAL MEDICINE

## 2019-05-27 PROCEDURE — 25000128 H RX IP 250 OP 636: Performed by: INTERNAL MEDICINE

## 2019-05-27 PROCEDURE — 12000000 ZZH R&B MED SURG/OB

## 2019-05-27 PROCEDURE — 93306 TTE W/DOPPLER COMPLETE: CPT | Mod: 26 | Performed by: INTERNAL MEDICINE

## 2019-05-27 PROCEDURE — 25000132 ZZH RX MED GY IP 250 OP 250 PS 637: Performed by: INTERNAL MEDICINE

## 2019-05-27 PROCEDURE — 85027 COMPLETE CBC AUTOMATED: CPT | Performed by: INTERNAL MEDICINE

## 2019-05-27 PROCEDURE — A9270 NON-COVERED ITEM OR SERVICE: HCPCS | Performed by: INTERNAL MEDICINE

## 2019-05-27 PROCEDURE — 80048 BASIC METABOLIC PNL TOTAL CA: CPT | Performed by: INTERNAL MEDICINE

## 2019-05-27 PROCEDURE — 36415 COLL VENOUS BLD VENIPUNCTURE: CPT | Performed by: INTERNAL MEDICINE

## 2019-05-27 PROCEDURE — 84484 ASSAY OF TROPONIN QUANT: CPT | Performed by: INTERNAL MEDICINE

## 2019-05-27 RX ORDER — OXYCODONE HYDROCHLORIDE 5 MG/1
5 TABLET ORAL EVERY 4 HOURS PRN
Status: DISCONTINUED | OUTPATIENT
Start: 2019-05-27 | End: 2019-05-31 | Stop reason: HOSPADM

## 2019-05-27 RX ADMIN — OXYCODONE HYDROCHLORIDE 5 MG: 5 TABLET ORAL at 18:11

## 2019-05-27 RX ADMIN — LISINOPRIL 40 MG: 10 TABLET ORAL at 07:40

## 2019-05-27 RX ADMIN — AMLODIPINE BESYLATE 10 MG: 5 TABLET ORAL at 07:40

## 2019-05-27 RX ADMIN — CLONIDINE HYDROCHLORIDE 0.1 MG: 0.1 TABLET ORAL at 07:40

## 2019-05-27 RX ADMIN — ISOSORBIDE DINITRATE 10 MG: 10 TABLET ORAL at 14:34

## 2019-05-27 RX ADMIN — GABAPENTIN 1600 MG: 400 CAPSULE ORAL at 21:58

## 2019-05-27 RX ADMIN — ACETAMINOPHEN AND CODEINE PHOSPHATE 1 TABLET: 300; 30 TABLET ORAL at 07:40

## 2019-05-27 RX ADMIN — ISOSORBIDE DINITRATE 10 MG: 10 TABLET ORAL at 07:40

## 2019-05-27 RX ADMIN — POTASSIUM CHLORIDE 20 MEQ: 1500 TABLET, EXTENDED RELEASE ORAL at 00:54

## 2019-05-27 RX ADMIN — CEFTRIAXONE SODIUM 1 G: 1 INJECTION, POWDER, FOR SOLUTION INTRAMUSCULAR; INTRAVENOUS at 17:53

## 2019-05-27 RX ADMIN — OXYCODONE HYDROCHLORIDE 5 MG: 5 TABLET ORAL at 21:58

## 2019-05-27 RX ADMIN — GABAPENTIN 800 MG: 400 CAPSULE ORAL at 14:34

## 2019-05-27 RX ADMIN — ISOSORBIDE DINITRATE 10 MG: 10 TABLET ORAL at 20:02

## 2019-05-27 RX ADMIN — METOPROLOL SUCCINATE 100 MG: 100 TABLET, EXTENDED RELEASE ORAL at 07:40

## 2019-05-27 RX ADMIN — GABAPENTIN 800 MG: 400 CAPSULE ORAL at 07:41

## 2019-05-27 RX ADMIN — ACETAMINOPHEN 650 MG: 325 TABLET, FILM COATED ORAL at 12:48

## 2019-05-27 RX ADMIN — ASPIRIN 81 MG: 81 TABLET, COATED ORAL at 07:41

## 2019-05-27 RX ADMIN — SERTRALINE HYDROCHLORIDE 100 MG: 100 TABLET ORAL at 07:40

## 2019-05-27 RX ADMIN — CLONIDINE HYDROCHLORIDE 0.1 MG: 0.1 TABLET ORAL at 20:02

## 2019-05-27 RX ADMIN — OXYCODONE HYDROCHLORIDE 5 MG: 5 TABLET ORAL at 11:16

## 2019-05-27 ASSESSMENT — ACTIVITIES OF DAILY LIVING (ADL)
ADLS_ACUITY_SCORE: 17
ADLS_ACUITY_SCORE: 18
ADLS_ACUITY_SCORE: 17
ADLS_ACUITY_SCORE: 17
ADLS_ACUITY_SCORE: 18
ADLS_ACUITY_SCORE: 18

## 2019-05-27 NOTE — PLAN OF CARE
Up with assist of one walker and t-belt. Alert and O x4 as day progressed. Lungs clear. Reports right knee and hip pain. Oxycodone and tylenol given with partial relief. Reports baseline lower ext tingling. Tele SR- ST with HR 70-90s. IV SL.

## 2019-05-27 NOTE — PROGRESS NOTES
Brief nimisha note:    Nimisha called [facility] at [place] [phone] and left a vm to confirm if the pt receives any help or services from them.      Nimisha will await a call back.    According to the pt, she is 100% independent at [facility] [employer].

## 2019-05-27 NOTE — PROGRESS NOTES
[facility]  Hospitalist Progress Note  Patient Name: Daya Ignacio    MRN: [id number]  Provider: Andre Mcmanus MD  05/27/19    Initial presenting complaint/issue to hospital (Diagnosis): nausea, vomiting, diarhhea, weakness, and fall         Assessment and Plan:      Daya Ignacio is an 88-year-old female with history of hypertension, macular degeneration, fall with vertebral body fracture, right eye retinal vein occlusion, and dyslipidemia.  She presented to the emergency department on 5/26/19 after a fall.  She had felt poorly for 3 days.  She had had nausea and a couple of episodes of vomiting.  She had one episode of diarrhea.  She felt weak and this was persisting.  According to Daya, she fell once the day before presentation and once the day of presentation.  According to her son she fell twice the day of presentation.  He came to check on her and she was on the floor unable to get up.  She had been on the ground for about 3 hours.  She was brought to the emergency department for evaluation.  She had no chest pain, shortness of breath, or orthopnea.  Emergency department evaluation showed stable vital signs.  Physical exam showed clinical dehydration.  CT of head was negative for intracranial hemorrhage or acute abnormality.  Labs showed white blood cell count of 17.6, potassium 3.1, BUN of 47, creatinine of 1.11, and troponin of 0.055.  Urinalysis showed 16 white blood cells, positive nitrite, moderate leukocyte esterase, and many bacteria.  Of note, CK was slightly elevated at 264.  Also of note lactic acid was normal at 0.8 and liver function tests were normal.  Daya was given Rocephin in the emergency department and admitted for further treatment of UTI and for further evaluation of troponin elevation.     Problem list:     1.  Urinary tract infection. WBC has normalized. Continue Rocephin.  Follow urine and blood cultures.     2.  Troponin elevation.  I suspect that this is troponin  "leak due to UTI and dehydration. Troponins were flat- not rising. Echo today showed normal EF and no wall motion abnormality (in fact, a previously seen posteriolateral wall motion abnormality was no longer seen). OK to stop tele. No further work up planned.     3.  Fall due to weakness from UTI.  PT consulted.     4.  Right knee pain- due to fall. Suspect contusion or strain. Xray showed no fracture but did show a small effusion. Symptomatic cares.      5.  Hypokalemia.  Replaced per protocol.     6.  Hypertension.  Continue prior to admission antihypertensive medications with appropriate hold parameters.     Full code  Mechanical DVT prophylaxis  Disposition: Likely here for 2 more days.         Interval History:      Right knee pain. Eating better. No nausea, vomiting, or diarrhea.                   Physical Exam:      Last Vital Signs:  /43   Pulse 65   Temp 98.3  F (36.8  C) (Oral)   Resp 18   Ht 1.575 m (5' 2\")   Wt 58.2 kg (128 lb 4.8 oz)   SpO2 96%   BMI 23.47 kg/m      Intake/Output Summary (Last 24 hours) at 5/27/2019 1259  Last data filed at 5/27/2019 1229  Gross per 24 hour   Intake 598 ml   Output 350 ml   Net 248 ml       GENERAL:  Comfortable. Cooperative.  PSYCH: pleasant, oriented, No acute distress.  EYES: PERRLA, Normal conjunctiva.  HEART:  Regular rate and rhythm. No JVD. Pulses normal. No edema.  LUNGS:  Clear to auscultation, normal Respiratory effort.  ABDOMEN:  Soft, no hepatosplenomegaly, normal bowel sounds.  EXTREMETIES: No clubbing, cyanosis or ischemia. Some swelling of right knee but no erythema or warmth.   SKIN:  Dry to touch, No rash.           Medications:      All current medications were reviewed.         Data:      All new lab and imaging data was reviewed.   Labs:  Recent Labs   Lab 05/26/19  1820 05/26/19  1813 05/26/19  1703   CULT PENDING PENDING PENDING          Lab Results   Component Value Date     05/27/2019     05/26/2019     02/19/2019    " Lab Results   Component Value Date    CHLORIDE 114 05/27/2019    CHLORIDE 106 05/26/2019    CHLORIDE 108 02/19/2019    Lab Results   Component Value Date    BUN 29 05/27/2019    BUN 47 05/26/2019    BUN 22 02/19/2019      Lab Results   Component Value Date    POTASSIUM 4.5 05/27/2019    POTASSIUM 3.3 05/26/2019    POTASSIUM 3.1 05/26/2019    Lab Results   Component Value Date    CO2 24 05/27/2019    CO2 27 05/26/2019    CO2 29 02/19/2019    Lab Results   Component Value Date    CR 0.68 05/27/2019    CR 1.11 05/26/2019    CR 0.75 02/19/2019        Recent Labs   Lab 05/27/19  0627 05/26/19  1531   WBC 8.0 17.6*   HGB 11.3* 13.1   HCT 35.7 39.4   MCV 97 93   * 199      Imaging:   Recent Results (from the past 24 hour(s))   Head CT w/o contrast    Narrative    CT SCAN OF THE HEAD WITHOUT CONTRAST   5/26/2019 4:34 PM     HISTORY: Multiple falls at home.    TECHNIQUE:  Axial images of the head and coronal reformations without  IV contrast material. Radiation dose for this scan was reduced using  automated exposure control, adjustment of the mA and/or kV according  to patient size, or iterative reconstruction technique.    COMPARISON: Head CT 5/28/2017.    FINDINGS: There is no evidence of intracranial hemorrhage, mass, acute  infarct or anomaly. The ventricles are normal in size, shape and  configuration. Mild diffuse parenchymal volume loss. Mild patchy  periventricular white matter hypodensities which are nonspecific, but  likely related to chronic microvascular ischemic disease.     The visualized portions of the sinuses and mastoids appear normal. The  bony calvarium and bones of the skull base appear intact.       Impression    IMPRESSION:     1. No evidence of acute intracranial hemorrhage, mass, or herniation.  2. Mild diffuse parenchymal volume loss and white matter changes  likely due to chronic microvascular ischemic disease.    IDA CARLSON MD   XR Knee Right 3 Views    Narrative    XR KNEE RT 3 VW  5/27/2019 12:34 PM    COMPARISON: None.    HISTORY: Fall with RIGHT knee pain.      Impression    IMPRESSION: Osteopenia about the RIGHT knee. There is a small knee  effusion, but no fractures are identified. Joint spaces are preserved.    VANI SOTO MD

## 2019-05-27 NOTE — H&P
[facility]  History and Physical   Hospitalist Service    Andre Mcmanus MD    Daya Ignacio MRN# [id number]   YOB: 1930 Age: 88 year old      Date of Admission:  5/26/2019           Assessment and Plan:   Daya Ignacio is an 88-year-old female with history of hypertension, macular degeneration, fall with vertebral body fracture, right eye retinal vein occlusion, and dyslipidemia.  She presented to the emergency department today after a fall.  She has felt poorly for approximately 3 days.  She has had nausea for the last 3 days and a couple of episodes of vomiting.  She had one episode of diarrhea today.  She felt weak yesterday and this persisted into today.  According to the patient, she fell once yesterday and once today.  According to her son she fell twice today.  He came to check on her and she was on the floor unable to get up.  She had been on the ground for about 3 hours.  She was brought to the emergency department for evaluation.  She had no chest pain, shortness of breath, or orthopnea.  Emergency department evaluation showed stable vital signs.  Physical exam showed clinical dehydration.  CT of head was negative for intracranial hemorrhage or acute abnormality.  Labs showed white blood cell count of 17.6, potassium 3.1, BUN of 47, creatinine of 1.11, and troponin of 0.055.  Urinalysis showed 16 white blood cells, positive nitrite, moderate leukocyte esterase, and many bacteria.  Of note, CK was slightly elevated at 264.  Also of note lactic acid was normal at 0.8 and liver function tests were normal.  Daya was given Rocephin in the emergency department.  I was asked to admit her for further treatment of UTI and for further evaluation of troponin elevation.    Problem list:    1.  Urinary tract infection.  Continue Rocephin.  Follow urine and blood cultures.    2.  Troponin elevation.  I suspect that this is troponin leak due to UTI and dehydration.  Monitor on  telemetry.  Follow serial troponins.  Obtain echocardiogram tomorrow.    3.  Fall due to weakness from UTI.  I will request PT evaluation.    4.  Hypokalemia.  Replace per protocol.    5.  Hypertension.  Continue prior to admission antihypertensive medications with appropriate hold parameters.    Full code  Mechanical DVT prophylaxis  Disposition: Admit as inpatient           Code Status:   Full Code         Primary Care Physician:   RodgerGertrudeMarcy N [phone]         Chief Complaint:   Nausea, vomiting, diarrhea, weakness, and fall    History is obtained from Daya, her son, Dr. Chun, and the medical record.          History of Present Illness:   Daya Ignacio is an 88-year-old female with history of hypertension, macular degeneration, fall with vertebral body fracture, right eye retinal vein occlusion, and dyslipidemia.  She presented to the emergency department today after a fall.  She has felt poorly for approximately 3 days.  She has had nausea for the last 3 days and a couple of episodes of vomiting.  She had one episode of diarrhea today.  She felt weak yesterday and this persisted into today.  According to the patient, she fell once yesterday and once today.  According to her son she fell twice today.  He came to check on her and she was on the floor unable to get up.  She had been on the ground for about 3 hours.  She was brought to the emergency department for evaluation.  She had no chest pain, shortness of breath, or orthopnea.  Emergency department evaluation showed stable vital signs.  Physical exam showed clinical dehydration.  CT of head was negative for intracranial hemorrhage or acute abnormality.  Labs showed white blood cell count of 17.6, potassium 3.1, BUN of 47, creatinine of 1.11, and troponin of 0.055.  Urinalysis showed 16 white blood cells, positive nitrite, moderate leukocyte esterase, and many bacteria.  Of note, CK was slightly elevated at 264.  Also of note lactic acid was  normal at 0.8 and liver function tests were normal.  Daya was given Rocephin in the emergency department.  I was asked to admit her for further treatment of UTI and for further evaluation of troponin elevation.           Past Medical History:     Patient Active Problem List   Diagnosis     Generalized anxiety disorder     Osteoporosis     Sciatica     HYPERLIPIDEMIA LDL GOAL <130     Hip fracture (H)     Advanced directives, counseling/discussion     Vitamin D deficiency     HTN (hypertension)     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     Hypertension goal BP (blood pressure) < 140/90     Chronic pain     NSTEMI (non-ST elevated myocardial infarction) (H)     Urinary tract infection without hematuria     Hip pain     Trochanteric fracture of left femur (H)     Diarrhea     Fall     Elevated troponin     Hypokalemia      Past Medical History:   Diagnosis Date     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 4/16/2014     Closed fracture of unspecified part of neck of femur      Closed fracture of unspecified part of vertebral column without mention of spinal cord injury     from fall summer 06     Elevated blood pressure reading without diagnosis of hypertension      Hypertension      Macular degeneration (senile) of retina, unspecified              Past Surgical History:     Past Surgical History:   Procedure Laterality Date     C NONSPECIFIC PROCEDURE      lt hip pain     ORTHOPEDIC SURGERY              Home Medications:     Prior to Admission medications    Medication Sig Last Dose Taking? Auth Provider   acetaminophen-codeine (TYLENOL #3) 300-30 MG tablet TAKE ONE TABLET BY MOUTH EVERY 8 HOURS AS NEEDED FOR SEVERE PAIN  Yes Marcy Soares PA-C   amLODIPine (NORVASC) 10 MG tablet Take 1 tablet (10 mg) by mouth daily 5/25/2019 Yes Marcy Soares PA-C   aspirin EC 81 MG EC tablet Take 1 tablet (81 mg) by mouth daily 5/25/2019 Yes Andre Mcmanus MD   Calcium Carbonate (CALCIUM 600 PO) Take 1 tablet by  mouth 2 times daily  5/25/2019 Yes Reported, Patient   Cholecalciferol (VITAMIN D3) 2000 UNITS CAPS Take 2,000 Units by mouth daily (with dinner) 5/25/2019 Yes Unknown, Entered By History   cloNIDine (CATAPRES) 0.1 MG tablet TAKE ONE TABLET BY MOUTH TWO TIMES A DAY 5/25/2019 Yes Marcy Soares PA-C   estradiol (ESTRACE) 0.1 MG/GM vaginal cream Place 2 g vaginally three times a week paraben-free  Yes Marcy Soares PA-C   gabapentin (NEURONTIN) 800 MG tablet Take 800 mg by mouth 2 times daily In AM and afternoon 5/25/2019 Yes Reported, Patient   gabapentin (NEURONTIN) 800 MG tablet Take 1,600 mg by mouth At Bedtime 5/25/2019 Yes Reported, Patient   ibuprofen (ADVIL) 200 MG tablet Take 200 mg by mouth every 4 hours as needed for mild pain or pain  Yes Reported, Patient   isosorbide dinitrate (ISORDIL) 10 MG tablet Take 1 tablet (10 mg) by mouth 3 times daily 5/25/2019 Yes Marcy Soares PA-C   lisinopril (PRINIVIL/ZESTRIL) 40 MG tablet Take 1 tablet (40 mg) by mouth daily 5/25/2019 Yes Marcy Soares PA-C   metoprolol succinate ER (TOPROL-XL) 100 MG 24 hr tablet Take 1 tablet (100 mg) by mouth daily 5/25/2019 Yes Marcy Soares PA-C   Multiple Vitamins-Minerals (PRESERVISION AREDS) CAPS Take 1 capsule by mouth 2 times daily 5/25/2019 Yes Unknown, Entered By History   sertraline (ZOLOFT) 100 MG tablet Take 1 tablet (100 mg) by mouth daily 5/25/2019 Yes Marcy Soares PA-C   vitamin C (ASCORBIC ACID) 500 MG tablet Take 500 mg by mouth daily 5/25/2019 Yes Reported, Patient            Allergies:     Allergies   Allergen Reactions     Atorvastatin Muscle Pain (Myalgia)     Macrobid [Nitrofurantoin Anhydrous] Other (See Comments)     Body aches     Sulfa Drugs             Social History:     Social History     Tobacco Use     Smoking status: Never Smoker     Smokeless tobacco: Never Used   Substance Use Topics     Alcohol use: No             Family History:     Family History   Problem Relation Age  of Onset     Alzheimer Disease Mother         mild     Cardiovascular Father         heart attack     Neurologic Disorder Brother 83        Parkinson's              Review of Systems:   The 10 point Review of Systems is negative other than as noted in the HPI.           Physical Exam:   Blood pressure 124/61, pulse 79, temperature 98.2  F (36.8  C), temperature source Oral, resp. rate 12, SpO2 98 %, not currently breastfeeding.  0 lbs 0 oz      GENERAL: Pleasant and cooperative. No acute distress.  EYES: Pupils equal and round. No scleral erythema or icterus.  ENT: External ears are normal without deformity. Posterior oropharynx is without erythem, swelling, or exudate.  NECK: Supple. No masses or swelling. No tenderness. Thyroid is normal without mass or tenderness.  CHEST: Clear to auscultation. Normal breath sounds. No retractions.   CV: Regular rate and rhythm. No JVD. Pulses normal.  ABDOMEN: Bowel sounds present. No tenderness. No masses or hernia.  EXTREMETIES: No clubbing, cyanosis, or ischemia.  SKIN: Warm and dry to touch. No wounds or rashes.  NEUROLOGIC: Strength and sensation are normal. Deep tendon reflexes are normal. Cranial nerves are normal.             Data:   All new lab and imaging data was reviewed.     Results for orders placed or performed during the hospital encounter of 05/26/19 (from the past 24 hour(s))   CBC with platelets differential   Result Value Ref Range    WBC 17.6 (H) 4.0 - 11.0 10e9/L    RBC Count 4.24 3.8 - 5.2 10e12/L    Hemoglobin 13.1 11.7 - 15.7 g/dL    Hematocrit 39.4 35.0 - 47.0 %    MCV 93 78 - 100 fl    MCH 30.9 26.5 - 33.0 pg    MCHC 33.2 31.5 - 36.5 g/dL    RDW 13.6 10.0 - 15.0 %    Platelet Count 199 150 - 450 10e9/L    Diff Method Automated Method     % Neutrophils 81.9 %    % Lymphocytes 10.4 %    % Monocytes 5.6 %    % Eosinophils 1.2 %    % Basophils 0.3 %    % Immature Granulocytes 0.6 %    Nucleated RBCs 0 0 /100    Absolute Neutrophil 14.4 (H) 1.6 - 8.3 10e9/L     Absolute Lymphocytes 1.8 0.8 - 5.3 10e9/L    Absolute Monocytes 1.0 0.0 - 1.3 10e9/L    Absolute Eosinophils 0.2 0.0 - 0.7 10e9/L    Absolute Basophils 0.1 0.0 - 0.2 10e9/L    Abs Immature Granulocytes 0.1 0 - 0.4 10e9/L    Absolute Nucleated RBC 0.0    Basic metabolic panel   Result Value Ref Range    Sodium 141 133 - 144 mmol/L    Potassium 3.1 (L) 3.4 - 5.3 mmol/L    Chloride 106 94 - 109 mmol/L    Carbon Dioxide 27 20 - 32 mmol/L    Anion Gap 8 3 - 14 mmol/L    Glucose 94 70 - 99 mg/dL    Urea Nitrogen 47 (H) 7 - 30 mg/dL    Creatinine 1.11 (H) 0.52 - 1.04 mg/dL    GFR Estimate 44 (L) >60 mL/min/[1.73_m2]    GFR Estimate If Black 51 (L) >60 mL/min/[1.73_m2]    Calcium 8.8 8.5 - 10.1 mg/dL   CK total   Result Value Ref Range    CK Total 264 (H) 30 - 225 U/L   Hepatic panel   Result Value Ref Range    Bilirubin Direct 0.1 0.0 - 0.2 mg/dL    Bilirubin Total 0.6 0.2 - 1.3 mg/dL    Albumin 3.5 3.4 - 5.0 g/dL    Protein Total 6.9 6.8 - 8.8 g/dL    Alkaline Phosphatase 83 40 - 150 U/L    ALT 17 0 - 50 U/L    AST 24 0 - 45 U/L   Lipase   Result Value Ref Range    Lipase 41 (L) 73 - 393 U/L   Troponin I   Result Value Ref Range    Troponin I ES 0.055 (H) 0.000 - 0.045 ug/L   EKG 12 lead   Result Value Ref Range    Interpretation ECG Click View Image link to view waveform and result    Head CT w/o contrast    Narrative    CT SCAN OF THE HEAD WITHOUT CONTRAST   5/26/2019 4:34 PM     HISTORY: Multiple falls at home.    TECHNIQUE:  Axial images of the head and coronal reformations without  IV contrast material. Radiation dose for this scan was reduced using  automated exposure control, adjustment of the mA and/or kV according  to patient size, or iterative reconstruction technique.    COMPARISON: Head CT 5/28/2017.    FINDINGS: There is no evidence of intracranial hemorrhage, mass, acute  infarct or anomaly. The ventricles are normal in size, shape and  configuration. Mild diffuse parenchymal volume loss. Mild  patchy  periventricular white matter hypodensities which are nonspecific, but  likely related to chronic microvascular ischemic disease.     The visualized portions of the sinuses and mastoids appear normal. The  bony calvarium and bones of the skull base appear intact.       Impression    IMPRESSION:     1. No evidence of acute intracranial hemorrhage, mass, or herniation.  2. Mild diffuse parenchymal volume loss and white matter changes  likely due to chronic microvascular ischemic disease.    IDA CARLSON MD   UA with Microscopic   Result Value Ref Range    Color Urine Yellow     Appearance Urine Slightly Cloudy     Glucose Urine Negative NEG^Negative mg/dL    Bilirubin Urine Negative NEG^Negative    Ketones Urine Trace (A) NEG^Negative mg/dL    Specific Gravity Urine 1.019 1.003 - 1.035    Blood Urine Negative NEG^Negative    pH Urine 5.5 5.0 - 7.0 pH    Protein Albumin Urine 20 (A) NEG^Negative mg/dL    Urobilinogen mg/dL Normal 0.0 - 2.0 mg/dL    Nitrite Urine Positive (A) NEG^Negative    Leukocyte Esterase Urine Moderate (A) NEG^Negative    Source Catheterized Urine     WBC Urine 16 (H) 0 - 5 /HPF    RBC Urine 2 0 - 2 /HPF    Bacteria Urine Many (A) NEG^Negative /HPF    Squamous Epithelial /HPF Urine 1 0 - 1 /HPF    Mucous Urine Present (A) NEG^Negative /LPF   Lactic acid whole blood   Result Value Ref Range    Lactic Acid 0.8 0.7 - 2.0 mmol/L

## 2019-05-27 NOTE — PLAN OF CARE
"Patient arrived on unit around 2000 from ED. Vitals upon arrival to unit: /60   Pulse 79   Temp 96  F (35.6  C) (Oral)   Resp 16   Ht 1.575 m (5' 2\")   Wt 58.2 kg (128 lb 4.8 oz)   SpO2 100%   BMI 23.47 kg/m  . Oxygen saturation 100% on RA. Alert and oriented X 4, but forgetful. Oriented to room and call light. Patient verbalized understanding. Orders received. Continue to monitor and notify MD with changes and concerns. Complains of right thigh/knee pain. PRN Tylenol # 3 given. Assist of 1 with walker/gait belt. IVF infusing. Potassium 3.3, 60 MEQ total given.   "

## 2019-05-28 ENCOUNTER — APPOINTMENT (OUTPATIENT)
Dept: PHYSICAL THERAPY | Facility: CLINIC | Age: 84
DRG: 690 | End: 2019-05-28
Attending: INTERNAL MEDICINE
Payer: MEDICARE

## 2019-05-28 LAB — POTASSIUM SERPL-SCNC: 4.1 MMOL/L (ref 3.4–5.3)

## 2019-05-28 PROCEDURE — 99232 SBSQ HOSP IP/OBS MODERATE 35: CPT | Performed by: INTERNAL MEDICINE

## 2019-05-28 PROCEDURE — 25000132 ZZH RX MED GY IP 250 OP 250 PS 637: Performed by: INTERNAL MEDICINE

## 2019-05-28 PROCEDURE — 25000128 H RX IP 250 OP 636: Performed by: INTERNAL MEDICINE

## 2019-05-28 PROCEDURE — 97161 PT EVAL LOW COMPLEX 20 MIN: CPT | Mod: GP | Performed by: PHYSICAL THERAPIST

## 2019-05-28 PROCEDURE — A9270 NON-COVERED ITEM OR SERVICE: HCPCS | Performed by: INTERNAL MEDICINE

## 2019-05-28 PROCEDURE — 36415 COLL VENOUS BLD VENIPUNCTURE: CPT | Performed by: INTERNAL MEDICINE

## 2019-05-28 PROCEDURE — 12000000 ZZH R&B MED SURG/OB

## 2019-05-28 PROCEDURE — 97116 GAIT TRAINING THERAPY: CPT | Mod: GP | Performed by: PHYSICAL THERAPIST

## 2019-05-28 PROCEDURE — 97110 THERAPEUTIC EXERCISES: CPT | Mod: GP | Performed by: PHYSICAL THERAPIST

## 2019-05-28 PROCEDURE — 84132 ASSAY OF SERUM POTASSIUM: CPT | Performed by: INTERNAL MEDICINE

## 2019-05-28 RX ORDER — MEROPENEM 500 MG/1
500 INJECTION, POWDER, FOR SOLUTION INTRAVENOUS EVERY 6 HOURS
Status: DISCONTINUED | OUTPATIENT
Start: 2019-05-28 | End: 2019-05-30

## 2019-05-28 RX ADMIN — GABAPENTIN 800 MG: 400 CAPSULE ORAL at 08:20

## 2019-05-28 RX ADMIN — CLONIDINE HYDROCHLORIDE 0.1 MG: 0.1 TABLET ORAL at 19:41

## 2019-05-28 RX ADMIN — MEROPENEM 500 MG: 500 INJECTION, POWDER, FOR SOLUTION INTRAVENOUS at 22:00

## 2019-05-28 RX ADMIN — ISOSORBIDE DINITRATE 10 MG: 10 TABLET ORAL at 19:41

## 2019-05-28 RX ADMIN — OXYCODONE HYDROCHLORIDE 5 MG: 5 TABLET ORAL at 14:26

## 2019-05-28 RX ADMIN — ISOSORBIDE DINITRATE 10 MG: 10 TABLET ORAL at 14:23

## 2019-05-28 RX ADMIN — SERTRALINE HYDROCHLORIDE 100 MG: 100 TABLET ORAL at 08:21

## 2019-05-28 RX ADMIN — OXYCODONE HYDROCHLORIDE 5 MG: 5 TABLET ORAL at 21:48

## 2019-05-28 RX ADMIN — LISINOPRIL 40 MG: 10 TABLET ORAL at 08:22

## 2019-05-28 RX ADMIN — METOPROLOL SUCCINATE 100 MG: 100 TABLET, EXTENDED RELEASE ORAL at 08:21

## 2019-05-28 RX ADMIN — AMLODIPINE BESYLATE 10 MG: 5 TABLET ORAL at 08:21

## 2019-05-28 RX ADMIN — GABAPENTIN 800 MG: 400 CAPSULE ORAL at 14:23

## 2019-05-28 RX ADMIN — ASPIRIN 81 MG: 81 TABLET, COATED ORAL at 08:21

## 2019-05-28 RX ADMIN — OXYCODONE HYDROCHLORIDE 5 MG: 5 TABLET ORAL at 18:22

## 2019-05-28 RX ADMIN — GABAPENTIN 1600 MG: 400 CAPSULE ORAL at 21:48

## 2019-05-28 RX ADMIN — ISOSORBIDE DINITRATE 10 MG: 10 TABLET ORAL at 08:21

## 2019-05-28 RX ADMIN — CEFTRIAXONE SODIUM 1 G: 1 INJECTION, POWDER, FOR SOLUTION INTRAMUSCULAR; INTRAVENOUS at 18:15

## 2019-05-28 RX ADMIN — OXYCODONE HYDROCHLORIDE 5 MG: 5 TABLET ORAL at 08:21

## 2019-05-28 RX ADMIN — CLONIDINE HYDROCHLORIDE 0.1 MG: 0.1 TABLET ORAL at 08:21

## 2019-05-28 ASSESSMENT — ACTIVITIES OF DAILY LIVING (ADL)
ADLS_ACUITY_SCORE: 17
ADLS_ACUITY_SCORE: 18
ADLS_ACUITY_SCORE: 18
ADLS_ACUITY_SCORE: 17
ADLS_ACUITY_SCORE: 18
ADLS_ACUITY_SCORE: 17

## 2019-05-28 NOTE — PLAN OF CARE
PT: PT christophe completed. Pt here due to fall and found to have UTI. Pt lives in the [facility]. Pt son assists with grocery shopping but otherwise inde with use of SEC. Pt does have grab bars, shower chair and does have a 4WW at home she used after hip surgeries in past. Of note pt does have macular degeneration and low vision.   Discharge Planner PT   Patient plan for discharge: home   Current status: Pt was able to tolerate 200 feet of ambulation, attempting some ambulation without assistive device, but this increased pain and her antalgic gait pattern was worse with decreased step length and foot flat. Pt improved pattern with 4WW.   Barriers to return to prior living situation: none  Recommendations for discharge: home with home PT  Rationale for recommendations: Pt would benefit from home PT to improve knee pain and balance. Pt would benefit from home therapy as pt does not drive and would decrease participation if needed to come into clinic for therapy due to knee pain.        Entered by: Tram Mancia 05/28/2019 8:59 AM     Physical Therapy Discharge Summary    Reason for therapy discharge:    Goals met    Progress towards therapy goal(s). See goals on Care Plan in Psychiatric electronic health record for goal details.  Goals met    Therapy recommendation(s):    Continued therapy is recommended.  Rationale/Recommendations: Pt is below baseline for functional mobility, ROM and strength. Pt would benefit from continued skilled therapy to address these deficits.

## 2019-05-28 NOTE — PLAN OF CARE
Pt. A&Ox4, but forgetful at times, up with assist of 1 and walker. Tele: Sinus Dwain, Lung sounds clear, room air sat's at 95%. Pt. Continues on Rocephin for UTI. Oxycodone for right knee pain. Pt. Denies any needs at this time. Will continue with POC.

## 2019-05-28 NOTE — PROGRESS NOTES
Brief sw note:    Amber from [facility] [phone] called and left a vm to confirm that the pt does not receive any services through them, but the pt is able to have private services if needed.    Addendum:  Sw was asked to see the pt regarding HC services and if her insurance will cover it or not.  Ahmet met with the pt and informed her that HC will be covered by her Medicare.  Sw explained that the pt has to be homebound in order to qualify for HC.  Pt believes at this time she is homebound.  Pt has used [facility] in the past and would like to use them again if needed.    Sw will send the referral to [facility] at discharge if needed.

## 2019-05-28 NOTE — PROGRESS NOTES
05/28/19 0933   Quick Adds   Type of Visit Initial PT Evaluation   Living Environment   Lives With alone   Living Arrangements independent living facility  ([facility])   Living Environment Comment Pt does have the option to eat 1 meal in cafeteria on weekdays but typically performs own cooking.    Self-Care   Usual Activity Tolerance good   Current Activity Tolerance moderate   Equipment Currently Used at Home grab bar, toilet;grab bar, tub/shower;shower chair;walker, rolling;cane, straight   Activity/Exercise/Self-Care Comment Pt typically uses SEC out of apartment, nothing in apartment for ambulation. Pt son does her driving due to her poor vision. Pt has macular degeneration.    Functional Level Prior   Ambulation 1-->assistive equipment  (SEC out of apartment)   Transferring 0-->independent   Toileting 1-->assistive equipment  (grab bar)   Bathing 1-->assistive equipment  (shower chair)   Communication 0-->understands/communicates without difficulty   Swallowing 0-->swallows foods/liquids without difficulty   Cognition 0 - no cognition issues reported   Fall history within last six months no   Number of times patient has fallen within last six months 2   Prior Functional Level Comment Pt does own meds, cooking, son performs grocery shopping or uses bus to perform grocery shopping from facility   General Information   Onset of Illness/Injury or Date of Surgery - Date 05/26/19   Referring Physician Dr. Mcmanus   Patient/Family Goals Statement Pt hoping to DC home   Pertinent History of Current Problem (include personal factors and/or comorbidities that impact the POC) Daya Ignacio is an 88-year-old female with history of hypertension, macular degeneration, fall with vertebral body fracture, right eye retinal vein occlusion, and dyslipidemia.  She presented to the emergency department on 5/26/19 after a fall.  She had felt poorly for 3 days.  She had had nausea and a couple of episodes of vomiting.  She had  one episode of diarrhea.  She felt weak and this was persisting.  According to Daya, she fell once the day before presentation and once the day of presentation.  According to her son she fell twice the day of presentation.  He came to check on her and she was on the floor unable to get up.  She had been on the ground for about 3 hours.  She was brought to the emergency department for evaluation.  She had no chest pain, shortness of breath, or orthopnea.  Emergency department evaluation showed stable vital signs.  Physical exam showed clinical dehydration.  CT of head was negative for intracranial hemorrhage or acute abnormality.  Labs showed white blood cell count of 17.6, potassium 3.1, BUN of 47, creatinine of 1.11, and troponin of 0.055.  Urinalysis showed 16 white blood cells, positive nitrite, moderate leukocyte esterase, and many bacteria.  Of note, CK was slightly elevated at 264.  Also of note lactic acid was normal at 0.8 and liver function tests were normal.  Daya was given Rocephin in the emergency department and admitted for further treatment of UTI and for further evaluation of troponin elevation.   Precautions/Limitations fall precautions   General Info Comments Pt agreeable to PT   Cognitive Status Examination   Orientation orientation to person, place and time   Level of Consciousness alert   Follows Commands and Answers Questions 100% of the time;able to follow multistep instructions   Personal Safety and Judgment intact   Memory intact   Pain Assessment   Patient Currently in Pain Yes, see Vital Sign flowsheet  (R knee pain)   Integumentary/Edema   Integumentary/Edema Comments mild swelling   Posture    Posture Forward head position;Protracted shoulders   Range of Motion (ROM)   ROM Comment WFL in supine R knee flexion/extension, mild diminshed heel stike/knee extension with ambulation   Strength   Strength Comments able to perform SLR and SAQ in supine, tolerating fair   Bed Mobility   Bed  "Mobility Comments inde   Transfer Skills   Transfer Comments inde/SBA with sit<>stand from bed and toilet, no use of grab bar in BR   Gait   Gait Comments mildly antalgic pattern with ambulation on the R with decreased step length, brett without AD, decreased knee extension and foot flat contact on the R   Balance   Balance Comments no LOB with ambulation   Coordination   Coordination no deficits were identified   Modality Interventions   Planned Modality Interventions Cryotherapy   General Therapy Interventions   Planned Therapy Interventions gait training;strengthening;transfer training;risk factor education;home program guidelines;progressive activity/exercise   Clinical Impression   Criteria for Skilled Therapeutic Intervention yes, treatment indicated   PT Diagnosis decreased ambulation due to R knee pain   Influenced by the following impairments R knee pain, decreased   Functional limitations due to impairments decreased gait pattern, need for AD   Clinical Presentation Stable/Uncomplicated   Clinical Presentation Rationale improving   Clinical Decision Making (Complexity) Low complexity   Therapy Frequency` daily   Predicted Duration of Therapy Intervention (days/wks) 1 day   Anticipated Discharge Disposition Home with Home Therapy   Risk & Benefits of therapy have been explained Yes   Patient, Family & other staff in agreement with plan of care Yes   [facility] [employer] TM \"6 Clicks\"   2016, Trustees of [facility], under license to [employer].  All rights reserved.   6 Clicks Short Forms Basic Mobility Inpatient Short Form   [facility] AM-PAC  \"6 Clicks\" V.2 Basic Mobility Inpatient Short Form   1. Turning from your back to your side while in a flat bed without using bedrails? 4 - None   2. Moving from lying on your back to sitting on the side of a flat bed without using bedrails? 4 - None   3. Moving to and from a bed to a chair (including a wheelchair)? 4 - None   4. Standing up from a " chair using your arms (e.g., wheelchair, or bedside chair)? 4 - None   5. To walk in hospital room? 4 - None   6. Climbing 3-5 steps with a railing? 3 - A Little   Basic Mobility Raw Score (Score out of 24.Lower scores equate to lower levels of function) 23   Total Evaluation Time   Total Evaluation Time (Minutes) 10

## 2019-05-28 NOTE — PROGRESS NOTES
[facility]  Hospitalist Progress Note  Patient Name: Daya Ignacio    MRN: [id number]  Provider: Andre Mcmanus MD  05/28/19    Initial presenting complaint/issue to hospital (Diagnosis): nausea, vomiting, diarrhea, and fall         Assessment and Plan:      Daya Ignacio is an 88-year-old female with history of hypertension, macular degeneration, fall with vertebral body fracture, right eye retinal vein occlusion, and dyslipidemia.  She presented to the emergency department on 5/26/19 after a fall.  She had felt poorly for 3 days.  She had had nausea and a couple of episodes of vomiting.  She had one episode of diarrhea.  She felt weak and this was persisting.  According to Daya, she fell once the day before presentation and once the day of presentation.  According to her son she fell twice the day of presentation.  He came to check on her and she was on the floor unable to get up.  She had been on the ground for about 3 hours.  She was brought to the emergency department for evaluation.  She had no chest pain, shortness of breath, or orthopnea.  Emergency department evaluation showed stable vital signs.  Physical exam showed clinical dehydration.  CT of head was negative for intracranial hemorrhage or acute abnormality.  Labs showed white blood cell count of 17.6, potassium 3.1, BUN of 47, creatinine of 1.11, and troponin of 0.055.  Urinalysis showed 16 white blood cells, positive nitrite, moderate leukocyte esterase, and many bacteria.  Of note, CK was slightly elevated at 264.  Also of note lactic acid was normal at 0.8 and liver function tests were normal.  Daya was given Rocephin in the emergency department and admitted for further treatment of UTI and for further evaluation of troponin elevation.     Problem list:     1.  Urinary tract infection. WBC has normalized. Continue Rocephin.  Follow urine and blood cultures- urine growing e coli.     2.  Troponin elevation.  I suspect that this is  "troponin leak due to UTI and dehydration. Troponins were flat- not rising. Echo showed normal EF and no wall motion abnormality (in fact, a previously seen posteriolateral wall motion abnormality was no longer seen). No further work up planned.     3.  Fall due to weakness from UTI.  PT consulted and plan is for home PT on discharge.      4.  Right knee pain- due to fall. Suspect contusion or strain. Xray showed no fracture but did show a small effusion. Symptomatic cares.      5.  Hypokalemia.  Replaced per protocol.     6.  Hypertension.  Continue prior to admission antihypertensive medications with appropriate hold parameters.     Full code  Mechanical DVT prophylaxis  Disposition: Likely home tomorrow            Interval History:      No new problems. Right leg is still sore.                   Physical Exam:      Last Vital Signs:  /53 (BP Location: Left arm)   Pulse 62   Temp 98.6  F (37  C) (Oral)   Resp 21   Ht 1.575 m (5' 2\")   Wt 58.2 kg (128 lb 4.8 oz)   SpO2 97%   BMI 23.47 kg/m      Intake/Output Summary (Last 24 hours) at 5/28/2019 1632  Last data filed at 5/28/2019 1237  Gross per 24 hour   Intake 600 ml   Output --   Net 600 ml       GENERAL:  Comfortable. Cooperative.  PSYCH: pleasant, oriented, No acute distress.  EYES: PERRLA, Normal conjunctiva.  HEART:  Regular rate and rhythm. No JVD. Pulses normal. No edema.  LUNGS:  Clear to auscultation, normal Respiratory effort.  ABDOMEN:  Soft, no hepatosplenomegaly, normal bowel sounds.  EXTREMETIES: No clubbing, cyanosis or ischemia  SKIN:  Dry to touch, No rash.           Medications:      All current medications were reviewed.         Data:      All new lab and imaging data was reviewed.   Labs:  Recent Labs   Lab 05/26/19  1820 05/26/19  1813 05/26/19  1703   CULT No growth after 2 days No growth after 2 days 50,000 to 100,000 colonies/mL  Escherichia coli  Susceptibility testing in progress  *          Lab Results   Component Value Date "     05/27/2019     05/26/2019     02/19/2019    Lab Results   Component Value Date    CHLORIDE 114 05/27/2019    CHLORIDE 106 05/26/2019    CHLORIDE 108 02/19/2019    Lab Results   Component Value Date    BUN 29 05/27/2019    BUN 47 05/26/2019    BUN 22 02/19/2019      Lab Results   Component Value Date    POTASSIUM 4.1 05/28/2019    POTASSIUM 4.5 05/27/2019    POTASSIUM 3.3 05/26/2019    Lab Results   Component Value Date    CO2 24 05/27/2019    CO2 27 05/26/2019    CO2 29 02/19/2019    Lab Results   Component Value Date    CR 0.68 05/27/2019    CR 1.11 05/26/2019    CR 0.75 02/19/2019        Recent Labs   Lab 05/27/19  0627 05/26/19  1531   WBC 8.0 17.6*   HGB 11.3* 13.1   HCT 35.7 39.4   MCV 97 93   * 199

## 2019-05-28 NOTE — PLAN OF CARE
Poor sight but sees  well enough to navigate around room ..VSS.   Tele SR   Urine clear. Medicated x1 for knee pain with good relief  Tele SR

## 2019-05-29 LAB
BACTERIA SPEC CULT: ABNORMAL
Lab: ABNORMAL
SPECIMEN SOURCE: ABNORMAL

## 2019-05-29 PROCEDURE — 12000000 ZZH R&B MED SURG/OB

## 2019-05-29 PROCEDURE — 25000132 ZZH RX MED GY IP 250 OP 250 PS 637: Performed by: INTERNAL MEDICINE

## 2019-05-29 PROCEDURE — 99232 SBSQ HOSP IP/OBS MODERATE 35: CPT | Performed by: INTERNAL MEDICINE

## 2019-05-29 PROCEDURE — 25000128 H RX IP 250 OP 636: Performed by: INTERNAL MEDICINE

## 2019-05-29 PROCEDURE — A9270 NON-COVERED ITEM OR SERVICE: HCPCS | Performed by: INTERNAL MEDICINE

## 2019-05-29 RX ADMIN — ISOSORBIDE DINITRATE 10 MG: 10 TABLET ORAL at 08:02

## 2019-05-29 RX ADMIN — MEROPENEM 500 MG: 500 INJECTION, POWDER, FOR SOLUTION INTRAVENOUS at 22:15

## 2019-05-29 RX ADMIN — METOPROLOL SUCCINATE 100 MG: 100 TABLET, EXTENDED RELEASE ORAL at 08:01

## 2019-05-29 RX ADMIN — GABAPENTIN 1600 MG: 400 CAPSULE ORAL at 22:14

## 2019-05-29 RX ADMIN — ISOSORBIDE DINITRATE 10 MG: 10 TABLET ORAL at 20:24

## 2019-05-29 RX ADMIN — OXYCODONE HYDROCHLORIDE 5 MG: 5 TABLET ORAL at 14:32

## 2019-05-29 RX ADMIN — AMLODIPINE BESYLATE 10 MG: 5 TABLET ORAL at 08:01

## 2019-05-29 RX ADMIN — GABAPENTIN 800 MG: 400 CAPSULE ORAL at 08:01

## 2019-05-29 RX ADMIN — SERTRALINE HYDROCHLORIDE 100 MG: 100 TABLET ORAL at 08:01

## 2019-05-29 RX ADMIN — GABAPENTIN 800 MG: 400 CAPSULE ORAL at 14:30

## 2019-05-29 RX ADMIN — ASPIRIN 81 MG: 81 TABLET, COATED ORAL at 08:02

## 2019-05-29 RX ADMIN — LISINOPRIL 40 MG: 10 TABLET ORAL at 08:01

## 2019-05-29 RX ADMIN — OXYCODONE HYDROCHLORIDE 5 MG: 5 TABLET ORAL at 08:02

## 2019-05-29 RX ADMIN — CLONIDINE HYDROCHLORIDE 0.1 MG: 0.1 TABLET ORAL at 20:24

## 2019-05-29 RX ADMIN — OXYCODONE HYDROCHLORIDE 5 MG: 5 TABLET ORAL at 19:37

## 2019-05-29 RX ADMIN — ISOSORBIDE DINITRATE 10 MG: 10 TABLET ORAL at 14:30

## 2019-05-29 RX ADMIN — MEROPENEM 500 MG: 500 INJECTION, POWDER, FOR SOLUTION INTRAVENOUS at 10:09

## 2019-05-29 RX ADMIN — MEROPENEM 500 MG: 500 INJECTION, POWDER, FOR SOLUTION INTRAVENOUS at 16:49

## 2019-05-29 RX ADMIN — CLONIDINE HYDROCHLORIDE 0.1 MG: 0.1 TABLET ORAL at 08:02

## 2019-05-29 RX ADMIN — MEROPENEM 500 MG: 500 INJECTION, POWDER, FOR SOLUTION INTRAVENOUS at 04:35

## 2019-05-29 ASSESSMENT — ACTIVITIES OF DAILY LIVING (ADL)
ADLS_ACUITY_SCORE: 15
ADLS_ACUITY_SCORE: 18
ADLS_ACUITY_SCORE: 15
ADLS_ACUITY_SCORE: 18

## 2019-05-29 NOTE — PROGRESS NOTES
[facility]  Hospitalist Progress Note  Patient Name: Daya Ignacio    MRN: [id number]  Provider: Andre Mcmanus MD  05/29/19    Initial presenting complaint/issue to hospital (Diagnosis): nausea, vomiting, diarrhea, weakness, and fall         Assessment and Plan:      Daya Ignacio is an 88-year-old female with history of hypertension, macular degeneration, fall with vertebral body fracture, right eye retinal vein occlusion, and dyslipidemia.  She presented to the emergency department on 5/26/19 after a fall.  She had felt poorly for 3 days.  She had had nausea and a couple of episodes of vomiting.  She had one episode of diarrhea.  She felt weak and this was persisting.  According to Daya, she fell once the day before presentation and once the day of presentation.  According to her son she fell twice the day of presentation.  He came to check on her and she was on the floor unable to get up.  She had been on the ground for about 3 hours.  She was brought to the emergency department for evaluation.  She had no chest pain, shortness of breath, or orthopnea. Emergency department evaluation showed stable vital signs.  Physical exam showed clinical dehydration.  CT of head was negative for intracranial hemorrhage or acute abnormality.  Labs showed white blood cell count of 17.6, potassium 3.1, BUN of 47, creatinine of 1.11, and troponin of 0.055.  Urinalysis showed 16 white blood cells, positive nitrite, moderate leukocyte esterase, and many bacteria.  Of note, CK was slightly elevated at 264.  Also of note lactic acid was normal at 0.8 and liver function tests were normal.  Daya was given Rocephin in the emergency department and admitted for further treatment of UTI and for further evaluation of troponin elevation. She improved with hydration and Rocephin. UC grew e coli ESBL. Rocephin was changed to Meropenem.      Problem list:     1.  Urinary tract infection with e coli ESBL. I discussed with   "Bashir. Plan to continue Merropenem for one more day and then, if improvement continues, discharge home with 10 days of Bactrim tomorrow.      2.  Troponin elevation.  I suspect that this is troponin leak due to UTI and dehydration. Troponins were flat- not rising. Echo today showed normal EF and no wall motion abnormality (in fact, a previously seen posteriolateral wall motion abnormality was no longer seen). OK to stop tele. No further work up planned.     3.  Fall due to weakness from UTI.  PT consulted.      4.  Right knee pain- due to fall. Suspect contusion or strain. Xray showed no fracture but did show a small effusion. Symptomatic cares.      5.  Hypokalemia.  Replaced per protocol.     6.  Hypertension.  Continue prior to admission antihypertensive medications with appropriate hold parameters.     Full code  Mechanical DVT prophylaxis  Disposition: Likely here for 2 more days.             Interval History:      Feeling better. Still weak but eating ok and getting around ok                  Physical Exam:      Last Vital Signs:  /44 (BP Location: Right arm)   Pulse 62   Temp 98.8  F (37.1  C) (Oral)   Resp 18   Ht 1.575 m (5' 2\")   Wt 58.2 kg (128 lb 4.8 oz)   SpO2 93%   BMI 23.47 kg/m      Intake/Output Summary (Last 24 hours) at 5/29/2019 1652  Last data filed at 5/29/2019 0844  Gross per 24 hour   Intake 210 ml   Output --   Net 210 ml       GENERAL:  Comfortable. Cooperative.  PSYCH: pleasant, oriented, No acute distress.  EYES: PERRLA, Normal conjunctiva.  HEART:  Regular rate and rhythm. No JVD. Pulses normal. No edema.  LUNGS:  Clear to auscultation, normal Respiratory effort.  ABDOMEN:  Soft, no hepatosplenomegaly, normal bowel sounds.  EXTREMETIES: No clubbing, cyanosis or ischemia  SKIN:  Dry to touch, No rash.           Medications:      All current medications were reviewed.         Data:      All new lab and imaging data was reviewed.   Labs:  Recent Labs   Lab 05/26/19  1820 " 05/26/19  1813 05/26/19  1703   CULT No growth after 3 days No growth after 3 days 50,000 to 100,000 colonies/mL  Escherichia coli ESBL  *  Critical Value/Significant Value called to and read back by  VEE DUNLAP RN ([email or web address]).  05.28.19 2112 GJS    Enterobacteriaceae that are susceptible to meropenem are usually susceptible to ertapenem.  ESBL (extended beta lactamase) producing organisms require contact precautions.          Lab Results   Component Value Date     05/27/2019     05/26/2019     02/19/2019    Lab Results   Component Value Date    CHLORIDE 114 05/27/2019    CHLORIDE 106 05/26/2019    CHLORIDE 108 02/19/2019    Lab Results   Component Value Date    BUN 29 05/27/2019    BUN 47 05/26/2019    BUN 22 02/19/2019      Lab Results   Component Value Date    POTASSIUM 4.1 05/28/2019    POTASSIUM 4.5 05/27/2019    POTASSIUM 3.3 05/26/2019    Lab Results   Component Value Date    CO2 24 05/27/2019    CO2 27 05/26/2019    CO2 29 02/19/2019    Lab Results   Component Value Date    CR 0.68 05/27/2019    CR 1.11 05/26/2019    CR 0.75 02/19/2019        Recent Labs   Lab 05/27/19  0627 05/26/19  1531   WBC 8.0 17.6*   HGB 11.3* 13.1   HCT 35.7 39.4   MCV 97 93   * 199

## 2019-05-29 NOTE — PROGRESS NOTES
Infection Prevention:    Patient requires Contact precautions because of ESBL: urine, 5/26/19. Please contact Infection Prevention with any questions/concerns at *[phone].    Annette Fatima, ICP

## 2019-05-29 NOTE — PROGRESS NOTES
I was notified regarding urine cultures growing E. coli and further review showed ESBL E. coli.  Review of notes showing ready improving with ceftriaxone.  However due to ESBL E. coli will change from ceftriaxone to meropenem and pharmacy to dose based on creatinine clearance    Rochelle

## 2019-05-29 NOTE — PLAN OF CARE
Afibrile.  Ambulating well with restrictive vision . Plan : treat ESBL in urine with IV antibiotics.  Medicated x2 with oxi for knee pain   Tele SR with peaked TS

## 2019-05-29 NOTE — PLAN OF CARE
Pt. A&Ox4, forgetful, up with assist of 1 and walker, Tele: SB, Lung sounds diminished. Room air sat's at 92-96%. Pt. C/o right knee pain (no fx seen on xray), Pain relief with Oxycodone. Urine cx +for ESBL E.Coli and abx changed from Rocephin to Merrem. Pt. Denies any needs at this time. Will continue with POC.

## 2019-05-29 NOTE — PROVIDER NOTIFICATION
05/28/19 2122   Significant Event   Significant Event Other (see comments)  (Lab called, Pt.'s urine cx growing E.coli, Pt. on Rocephin)   MD paged.

## 2019-05-30 PROCEDURE — 25000132 ZZH RX MED GY IP 250 OP 250 PS 637: Performed by: INTERNAL MEDICINE

## 2019-05-30 PROCEDURE — 12000000 ZZH R&B MED SURG/OB

## 2019-05-30 PROCEDURE — 25000128 H RX IP 250 OP 636: Performed by: INTERNAL MEDICINE

## 2019-05-30 PROCEDURE — A9270 NON-COVERED ITEM OR SERVICE: HCPCS | Performed by: INTERNAL MEDICINE

## 2019-05-30 PROCEDURE — 99232 SBSQ HOSP IP/OBS MODERATE 35: CPT | Performed by: INTERNAL MEDICINE

## 2019-05-30 PROCEDURE — 99207 ZZC CDG-MDM COMPONENT: MEETS LOW - DOWN CODED: CPT | Performed by: INTERNAL MEDICINE

## 2019-05-30 RX ORDER — DIPHENHYDRAMINE HCL 25 MG
25 CAPSULE ORAL EVERY 6 HOURS PRN
Status: DISCONTINUED | OUTPATIENT
Start: 2019-05-30 | End: 2019-05-31 | Stop reason: HOSPADM

## 2019-05-30 RX ORDER — OXYCODONE HYDROCHLORIDE 5 MG/1
5 TABLET ORAL EVERY 6 HOURS PRN
Qty: 8 TABLET | Refills: 0 | Status: SHIPPED | OUTPATIENT
Start: 2019-05-30 | End: 2019-06-18

## 2019-05-30 RX ORDER — SULFAMETHOXAZOLE/TRIMETHOPRIM 800-160 MG
1 TABLET ORAL 2 TIMES DAILY
Status: DISCONTINUED | OUTPATIENT
Start: 2019-05-30 | End: 2019-05-31 | Stop reason: HOSPADM

## 2019-05-30 RX ORDER — ACETAMINOPHEN 500 MG
1000 TABLET ORAL EVERY 8 HOURS
Qty: 30 TABLET | Refills: 0 | Status: SHIPPED | OUTPATIENT
Start: 2019-05-30 | End: 2019-06-10

## 2019-05-30 RX ORDER — SULFAMETHOXAZOLE/TRIMETHOPRIM 800-160 MG
1 TABLET ORAL 2 TIMES DAILY
Qty: 20 TABLET | Refills: 0 | Status: SHIPPED | OUTPATIENT
Start: 2019-05-30 | End: 2019-06-10

## 2019-05-30 RX ADMIN — ISOSORBIDE DINITRATE 10 MG: 10 TABLET ORAL at 08:11

## 2019-05-30 RX ADMIN — MEROPENEM 500 MG: 500 INJECTION, POWDER, FOR SOLUTION INTRAVENOUS at 10:55

## 2019-05-30 RX ADMIN — OXYCODONE HYDROCHLORIDE 5 MG: 5 TABLET ORAL at 06:14

## 2019-05-30 RX ADMIN — ISOSORBIDE DINITRATE 10 MG: 10 TABLET ORAL at 21:02

## 2019-05-30 RX ADMIN — METOPROLOL SUCCINATE 100 MG: 100 TABLET, EXTENDED RELEASE ORAL at 08:11

## 2019-05-30 RX ADMIN — GABAPENTIN 800 MG: 400 CAPSULE ORAL at 13:38

## 2019-05-30 RX ADMIN — ASPIRIN 81 MG: 81 TABLET, COATED ORAL at 08:11

## 2019-05-30 RX ADMIN — CLONIDINE HYDROCHLORIDE 0.1 MG: 0.1 TABLET ORAL at 21:02

## 2019-05-30 RX ADMIN — MEROPENEM 500 MG: 500 INJECTION, POWDER, FOR SOLUTION INTRAVENOUS at 04:28

## 2019-05-30 RX ADMIN — AMLODIPINE BESYLATE 10 MG: 5 TABLET ORAL at 08:11

## 2019-05-30 RX ADMIN — OXYCODONE HYDROCHLORIDE 5 MG: 5 TABLET ORAL at 13:39

## 2019-05-30 RX ADMIN — LISINOPRIL 40 MG: 10 TABLET ORAL at 08:11

## 2019-05-30 RX ADMIN — GABAPENTIN 800 MG: 400 CAPSULE ORAL at 08:11

## 2019-05-30 RX ADMIN — OXYCODONE HYDROCHLORIDE 5 MG: 5 TABLET ORAL at 18:36

## 2019-05-30 RX ADMIN — CLONIDINE HYDROCHLORIDE 0.1 MG: 0.1 TABLET ORAL at 08:12

## 2019-05-30 RX ADMIN — SERTRALINE HYDROCHLORIDE 100 MG: 100 TABLET ORAL at 08:12

## 2019-05-30 RX ADMIN — SULFAMETHOXAZOLE AND TRIMETHOPRIM 1 TABLET: 800; 160 TABLET ORAL at 21:02

## 2019-05-30 RX ADMIN — GABAPENTIN 1600 MG: 400 CAPSULE ORAL at 21:41

## 2019-05-30 RX ADMIN — ISOSORBIDE DINITRATE 10 MG: 10 TABLET ORAL at 13:38

## 2019-05-30 ASSESSMENT — ACTIVITIES OF DAILY LIVING (ADL)
ADLS_ACUITY_SCORE: 18
ADLS_ACUITY_SCORE: 20
ADLS_ACUITY_SCORE: 18

## 2019-05-30 NOTE — PLAN OF CARE
"VS /54 (BP Location: Right arm)   Pulse 62   Temp 96.4  F (35.8  C) (Oral)   Resp 20   Ht 1.575 m (5' 2\")   Wt 58.2 kg (128 lb 4.8 oz)   SpO2 93%   BMI 23.47 kg/m    Lung sounds Clear/diminished in the bases  O2 room air  Tele Sinus Rhythm  Bowel sounds Active and audible in all quadrants  Tolerating regular diet  IVF Saline locked- IV Merrem for antibiotics for ESBL in Urine  Dressings none  Tests none  CMS Intact  Drains none  Activity up with Ax1 GB and walker  Pain denies  Patient/family centered care Support Given and questions answered  Discharge plan Discharge today 05/30/19 to [facility] with [facility]. Slept well through night, denies pain. A&O x3- not to time. Continue POC.    "

## 2019-05-30 NOTE — DISCHARGE INSTRUCTIONS
Your home care referral was sent to [facility]   If you haven't heard from them within the next 24-48 hours,  Please call them at [phone]

## 2019-05-30 NOTE — PROGRESS NOTES
[facility]  Hospitalist Progress Note  Dang Stahl MD 05/30/2019    Reason for Stay (Diagnosis): ESBL UTI         Assessment and Plan:      Summary of Stay: Daya Ignacio is a 88 year old female with a history of hypertension, hyperlipidemia, history of a fall complicated by vertebral body fracture, right eye retinal vein exclusion who presented to the emergency room on 5/26/2019 after a fall.      Prior to that episode she had nausea and a couple episodes of vomiting.  She felt weak and was progressive, she was having increasing falls so presented to the ER for evaluation.  Evaluation was notable for UTI, she was appropriately placed on ceftriaxone unfortunately urine culture has returned positive for ESBL E. coli.  She is subsequently been switched over to meropenem on 5/28/2019 and plans were to discharge today with TMP sulfa double strength twice daily for 10 days.    I note that she does have an allergy to sulfa, unfortunately she is no idea what her reaction was.  She does however deny any anaphylactic reaction, and she denies any significant skin rashes.  For these reasons we will stop the meropenem and challenge her with oral TMP sulfa in the hospital  Problem List:   1. ESBL UTI: She is received 3 days of IV meropenem plans are to switch over to TMP sulfa for 10 days.  She does carry an allergy and fortunately she has no idea what that allergy is.  Therefore we will just monitor overnight and start treatment to ensure no significant reaction.  2. Hypertension: PTA regimen is amlodipine 10 mg daily, clonidine 0.1 mg p.o. twice daily, isosorbide 10 mg p.o. 3 times daily, lisinopril 40 mg p.o. daily, and metoprolol 100 mg p.o. daily.  All of been resumed.  BPs ranging between 100-1 50 over 40s to 50s.  We will set staggered parameters  3. Hyperlipidemia I do not see any home medications  4. Left arm pain:  Achy in quality, comes on and lasts for hours, thinks it began while watching TV.  Present  "for one week.  Not exertional. No n/v/diaphoresis or associated SOB.  Thought she was having a heart attack   DVT Prophylaxis: Pneumatic Compression Devices  Code Status: Full Code  Functional Status: Dependent  Diet: Regular  Almanza: Not present    Disposition Plan   Expected discharge in 1 days to prior living arrangement once known to tolerate tmp/sulfa otherwise might need alternative strategy.     Entered: Dang Stahl 05/30/2019, 5:19 PM           Interval History (Subjective):      Feeling pretty good, no more urinary symptoms, no cp or sob, no n/v/d and po intake ok                  Physical Exam:      Last Vital Signs:  /45 (BP Location: Right arm)   Pulse 62   Temp 96.8  F (36  C) (Oral)   Resp 20   Ht 1.575 m (5' 2\")   Wt 58.2 kg (128 lb 4.8 oz)   SpO2 95%   BMI 23.47 kg/m      I/O: + 2L  Weight:  Not being tracked  Tele:  NSR    Pleasant nad looks stated age head n/cat sclera clear lungs ctab nl effort rrr no mrg no le edema skin warm and dry no c/c alert and oriented affected area abdomen s/nt/nd ac            Medications:      All current medications were reviewed with changes reflected in problem list.         Data:      All new lab and imaging data was reviewed.   Labs:  Recent Labs   Lab 05/28/19  0715 05/27/19  0627   NA  --  143   POTASSIUM 4.1 4.5   CHLORIDE  --  114*   CO2  --  24   ANIONGAP  --  5   GLC  --  96   BUN  --  29   CR  --  0.68   GFRESTIMATED  --  78   GFRESTBLACK  --  90   DAVE  --  8.4*     Recent Labs   Lab 05/27/19  0627   WBC 8.0   HGB 11.3*   HCT 35.7   MCV 97   *      Imaging:       "

## 2019-05-30 NOTE — PLAN OF CARE
DX :    UTI  HX :HTN, Macular degeneration, fall with vertebral fracture,   LABS : K 4.1,  Maikel 8.4  TELE:   SR/ 1st degree AV block.  GI/ : Continent of B&B, voids spontaneously with out difficulties.  DIET : Regular diet  ASSESS; A&OX 3, disoriented to time. Assist of 1 with gait belt and walker for transferring, afebrile.   PAIN : C/o of pain to bilateral knee. Oxycodone given X1.  ACTIVITY :  Up with assist of 1  TEACHING : use call button for help, Educated on urinary tract infection and iv meropenem for treatment.  PLAN FOR DISCHARGE : 1 to 2 days.

## 2019-05-30 NOTE — PLAN OF CARE
Very pleasant and,independant even with sight limitations.  Medicated x2 this shift with oxi for knee discomfort.  VSS Tele SR

## 2019-05-30 NOTE — PROGRESS NOTES
Per AM care rounds, planning to discharge pt today back to her independent apartment at [facility]. SW previously discussed HC with pt, she prefers [facility]. Referral sent to HC for RN/PT on discharge. AVS updated.     CM will continue to follow patient for any additional discharge needs.     Shea Starr RN BSN CTS   [phone]  Care Transitions Team  [facility]

## 2019-05-31 VITALS
HEIGHT: 62 IN | HEART RATE: 62 BPM | WEIGHT: 128.3 LBS | TEMPERATURE: 97.9 F | OXYGEN SATURATION: 96 % | BODY MASS INDEX: 23.61 KG/M2 | DIASTOLIC BLOOD PRESSURE: 57 MMHG | SYSTOLIC BLOOD PRESSURE: 139 MMHG | RESPIRATION RATE: 16 BRPM

## 2019-05-31 LAB
ANION GAP SERPL CALCULATED.3IONS-SCNC: 4 MMOL/L (ref 3–14)
BUN SERPL-MCNC: 17 MG/DL (ref 7–30)
CALCIUM SERPL-MCNC: 9 MG/DL (ref 8.5–10.1)
CHLORIDE SERPL-SCNC: 105 MMOL/L (ref 94–109)
CO2 SERPL-SCNC: 30 MMOL/L (ref 20–32)
CREAT SERPL-MCNC: 0.83 MG/DL (ref 0.52–1.04)
ERYTHROCYTE [DISTWIDTH] IN BLOOD BY AUTOMATED COUNT: 13.2 % (ref 10–15)
GFR SERPL CREATININE-BSD FRML MDRD: 62 ML/MIN/{1.73_M2}
GLUCOSE SERPL-MCNC: 99 MG/DL (ref 70–99)
HCT VFR BLD AUTO: 34.2 % (ref 35–47)
HGB BLD-MCNC: 11.1 G/DL (ref 11.7–15.7)
MCH RBC QN AUTO: 31 PG (ref 26.5–33)
MCHC RBC AUTO-ENTMCNC: 32.5 G/DL (ref 31.5–36.5)
MCV RBC AUTO: 96 FL (ref 78–100)
PLATELET # BLD AUTO: 180 10E9/L (ref 150–450)
POTASSIUM SERPL-SCNC: 4.2 MMOL/L (ref 3.4–5.3)
RBC # BLD AUTO: 3.58 10E12/L (ref 3.8–5.2)
SODIUM SERPL-SCNC: 139 MMOL/L (ref 133–144)
TROPONIN I SERPL-MCNC: <0.015 UG/L (ref 0–0.04)
WBC # BLD AUTO: 6 10E9/L (ref 4–11)

## 2019-05-31 PROCEDURE — A9270 NON-COVERED ITEM OR SERVICE: HCPCS | Performed by: INTERNAL MEDICINE

## 2019-05-31 PROCEDURE — 99239 HOSP IP/OBS DSCHRG MGMT >30: CPT | Performed by: INTERNAL MEDICINE

## 2019-05-31 PROCEDURE — 84484 ASSAY OF TROPONIN QUANT: CPT | Performed by: INTERNAL MEDICINE

## 2019-05-31 PROCEDURE — 80048 BASIC METABOLIC PNL TOTAL CA: CPT | Performed by: INTERNAL MEDICINE

## 2019-05-31 PROCEDURE — 36415 COLL VENOUS BLD VENIPUNCTURE: CPT | Performed by: INTERNAL MEDICINE

## 2019-05-31 PROCEDURE — 25000132 ZZH RX MED GY IP 250 OP 250 PS 637: Performed by: INTERNAL MEDICINE

## 2019-05-31 PROCEDURE — 85027 COMPLETE CBC AUTOMATED: CPT | Performed by: INTERNAL MEDICINE

## 2019-05-31 RX ADMIN — ISOSORBIDE DINITRATE 10 MG: 10 TABLET ORAL at 13:36

## 2019-05-31 RX ADMIN — CLONIDINE HYDROCHLORIDE 0.1 MG: 0.1 TABLET ORAL at 07:54

## 2019-05-31 RX ADMIN — OXYCODONE HYDROCHLORIDE 5 MG: 5 TABLET ORAL at 01:02

## 2019-05-31 RX ADMIN — LISINOPRIL 40 MG: 10 TABLET ORAL at 07:55

## 2019-05-31 RX ADMIN — GABAPENTIN 800 MG: 400 CAPSULE ORAL at 07:56

## 2019-05-31 RX ADMIN — OXYCODONE HYDROCHLORIDE 5 MG: 5 TABLET ORAL at 13:34

## 2019-05-31 RX ADMIN — SERTRALINE HYDROCHLORIDE 100 MG: 100 TABLET ORAL at 07:55

## 2019-05-31 RX ADMIN — ASPIRIN 81 MG: 81 TABLET, COATED ORAL at 07:55

## 2019-05-31 RX ADMIN — ISOSORBIDE DINITRATE 10 MG: 10 TABLET ORAL at 07:55

## 2019-05-31 RX ADMIN — GABAPENTIN 800 MG: 400 CAPSULE ORAL at 13:35

## 2019-05-31 RX ADMIN — METOPROLOL SUCCINATE 100 MG: 100 TABLET, EXTENDED RELEASE ORAL at 07:56

## 2019-05-31 RX ADMIN — SULFAMETHOXAZOLE AND TRIMETHOPRIM 1 TABLET: 800; 160 TABLET ORAL at 07:59

## 2019-05-31 RX ADMIN — OXYCODONE HYDROCHLORIDE 5 MG: 5 TABLET ORAL at 07:56

## 2019-05-31 RX ADMIN — ACETAMINOPHEN 650 MG: 325 TABLET, FILM COATED ORAL at 01:02

## 2019-05-31 RX ADMIN — AMLODIPINE BESYLATE 10 MG: 5 TABLET ORAL at 07:55

## 2019-05-31 ASSESSMENT — ACTIVITIES OF DAILY LIVING (ADL)
ADLS_ACUITY_SCORE: 16
ADLS_ACUITY_SCORE: 18
ADLS_ACUITY_SCORE: 16
ADLS_ACUITY_SCORE: 16

## 2019-05-31 NOTE — PROGRESS NOTES
[facility]  Met with pt to discuss plans for HC.  Pt to be discharged home today and has agreed to have [facility] follow with services of SN and PT. Patient care support center processing referral.  Pt verbalized understanding that initial visit is scheduled for 6/1 or 6/2/19. Pt has 24 hour phone number for [facility] for any questions or concerns.

## 2019-05-31 NOTE — PLAN OF CARE
Pt had frequent falls and came into ED diagnosed with UTI. Culture showed ESBL and e.coli in urine. VSS. Regular diet, Independent in room. Contingent of urine with urinary frequency. On contact precautions. Rates pain 7/10- given PRN oxy and tylenol for pain which was effective. 2 sec pause on tele. Pt asymptomatic. Blood cultures showed no growth after 5 days.

## 2019-05-31 NOTE — PLAN OF CARE
dischged after instructions reviewed and acknowledges understanding and compliance with poc  Filled meds given to pt

## 2019-05-31 NOTE — DISCHARGE SUMMARY
Discharge Summary  Hospitalist Service    Daya Ignacio MRN# [id number]   YOB: 1930 Age: 88 year old     Date of Admission:  5/26/2019  Date of Discharge:  5/31/2019  Admitting Physician:  Andre Mcmanus MD  Discharge Physician: Dang Stahl MD  Discharging Service: Hospitalist Service     Primary Provider: Marcy Soares  Primary Care Physician Phone Number: [phone]         Discharge Diagnoses/Problem Oriented Hospital Course (Providers):    Daya Ignacio was admitted on 5/26/2019 by Andre Mcmanus MD and I would refer you to their history and physical.  The following problems were addressed during her hospitalization:      ESBL E coli UTI  Left arm pain  Elevated troponin  hlp           Code Status:      Full Code        Brief Hospital Stay Summary Sent Home With Patient in AVS:       Summary of Stay: Daya Ignacio is a 88 year old female with a history of hypertension, hyperlipidemia, history of a fall complicated by vertebral body fracture, right eye retinal vein exclusion who presented to the emergency room on 5/26/2019 after a fall.       Prior to that episode she had nausea and a couple episodes of vomiting.  She felt weak and was progressive, she was having increasing falls so presented to the ER for evaluation.  Evaluation was notable for UTI, she was appropriately placed on ceftriaxone unfortunately urine culture returned positive for ESBL E. coli.  She is subsequently been switched over to meropenem on 5/28/2019 and plans were to discharge 5/30/2019 with TMP sulfa double strength twice daily for 10 days.     But, she apparently has an allergy to sulfa, unfortunately she is no idea what her reaction was.  She does however deny any anaphylactic reaction, and she denies any significant skin rashes.  For these reasons we will stopped the meropenem and challenged her with oral TMP sulfa beginning the night prior to discharge in the hospital.  She is tolerating the antibiotics  without any difficulty whatsoever.    Today, however she states that she has been having left arm pain is wondering if she has had a heart attack.  The symptoms began a couple of days prior to her admission she describes it as aching in nature without associated nausea vomiting diaphoresis or shortness of breath.  They come on unpredictably and last anywhere between an hour to several hours.  They are not associated with exertion.  Her admission troponins were slightly elevated at 0.050 and flat, echocardiogram at this hospitalization shows normal ejection fraction.  It is hard to know if this is representative of angina I had planned on pursuing a stress test today but unfortunately she had coffee this morning.  She was up walking the halls without difficulty.  At this time it seems reasonable to discharge her with close stress testing in the outpatient setting, and returning to the emergency room if severe symptoms occur.    Problem List:   1. ESBL UTI: She is received 3 days of IV meropenem plans are to switch over to TMP sulfa for 10 days.  She does carry an allergy and fortunately she has no idea what that allergy is.    She tolerated 2 doses prior to discharge without difficulty.  We discussed the risks of diffuse skin rash/toxic epidermal necrolysis, and anaphylaxis and that she is to seek immediate help should any of these symptoms develop.  2. Hypertension: PTA regimen is amlodipine 10 mg daily, clonidine 0.1 mg p.o. twice daily, isosorbide 10 mg p.o. 3 times daily, lisinopril 40 mg p.o. daily, and metoprolol 100 mg p.o. daily.  All of been resumed.    BP is under reasonable control  3. Hyperlipidemia I do not see any home medications  4. Left arm pain-she brings this to my attention today:  Achy in quality, comes on and lasts for hours, thinks it began while watching TV.  Present for one week.  Not exertional. No n/v/diaphoresis or associated SOB.  Thought she was having a heart attack.  This is been  coming and going since about 2 days prior to this hospitalization.  Initial troponins were mildly elevated at 0.050 and flat, echo cardiogram shows preserved ejection fraction.  Features seem not consistent with coronary artery disease yet in women that is not unusual.  I did check a single troponin on the day of discharge which is undetectable. given age and secondary risk factors I think she deserves a stress test of some type.  I did been helpful like to get a Lexiscan done this afternoon prior to discharge but unfortunately she had some coffee this morning.  Therefore a Lexiscan stress test cannot be performed before Monday.  She is not interested in staying the weekend and I am not sure that it is necessary.  She is quite reliable, at this time I will set her up for outpatient Lexiscan hopefully by Monday, and she has been given instructions on when to return for urgent evaluation if need be.  DVT Prophylaxis: Pneumatic Compression Devices  Code Status: Full Code  Functional Status: Dependent  Diet: Regular  Almanza: Not present         Important Results:      As noted below         Pending Results:        Unresulted Labs Ordered in the Past 30 Days of this Admission     Date and Time Order Name Status Description    5/26/2019 1748 Blood culture Preliminary     5/26/2019 1748 Blood culture Preliminary       No growth after 5 days      Discharge Instructions and Follow-Up:      Follow-up Appointments     Follow-up and recommended labs and tests       F/u with your primary MD in 5-10 days  Home PT/RN ordered  Return for evaluation with CP  Please call Radiology dept [phone] on Monday to arrange for lexiscan   testing               Discharge Disposition:      Discharged to home         Discharge Medications:        Current Discharge Medication List      START taking these medications    Details   acetaminophen (TYLENOL) 500 MG tablet Take 2 tablets (1,000 mg) by mouth every 8 hours for 5 days  Qty: 30 tablet,  Refills: 0    Associated Diagnoses: Fall, sequela      oxyCODONE (ROXICODONE) 5 MG tablet Take 1 tablet (5 mg) by mouth every 6 hours as needed for moderate to severe pain  Qty: 8 tablet, Refills: 0    Associated Diagnoses: Fall, sequela      sulfamethoxazole-trimethoprim (BACTRIM DS/SEPTRA DS) 800-160 MG tablet Take 1 tablet by mouth 2 times daily for 10 days  Qty: 20 tablet, Refills: 0    Associated Diagnoses: Urinary tract infection without hematuria, site unspecified         CONTINUE these medications which have CHANGED    Details   acetaminophen-codeine (TYLENOL #3) 300-30 MG tablet TAKE ONE TABLET BY MOUTH EVERY 8 HOURS AS NEEDED FOR SEVERE PAIN    Hold this medication while on scheduled tylenol and as needed oxycodone  Qty: 270 tablet, Refills: 0    Associated Diagnoses: Bursitis of hip, unspecified bursa, unspecified laterality         CONTINUE these medications which have NOT CHANGED    Details   amLODIPine (NORVASC) 10 MG tablet Take 1 tablet (10 mg) by mouth daily  Qty: 90 tablet, Refills: 1    Associated Diagnoses: Non-STEMI (non-ST elevated myocardial infarction) (H)      aspirin EC 81 MG EC tablet Take 1 tablet (81 mg) by mouth daily  Qty: 30 tablet, Refills: 3    Associated Diagnoses: Non-STEMI (non-ST elevated myocardial infarction) (H)      Calcium Carbonate (CALCIUM 600 PO) Take 1 tablet by mouth 2 times daily       Cholecalciferol (VITAMIN D3) 2000 UNITS CAPS Take 2,000 Units by mouth daily (with dinner)      cloNIDine (CATAPRES) 0.1 MG tablet TAKE ONE TABLET BY MOUTH TWO TIMES A DAY  Qty: 180 tablet, Refills: 0    Associated Diagnoses: Essential hypertension      estradiol (ESTRACE) 0.1 MG/GM vaginal cream Place 2 g vaginally three times a week paraben-free  Qty: 6 g, Refills: 3    Associated Diagnoses: Symptomatic menopausal or female climacteric states      !! gabapentin (NEURONTIN) 800 MG tablet Take 800 mg by mouth 2 times daily In AM and afternoon      !! gabapentin (NEURONTIN) 800 MG tablet  "Take 1,600 mg by mouth At Bedtime      ibuprofen (ADVIL) 200 MG tablet Take 200 mg by mouth every 4 hours as needed for mild pain or pain      isosorbide dinitrate (ISORDIL) 10 MG tablet Take 1 tablet (10 mg) by mouth 3 times daily  Qty: 270 tablet, Refills: 3    Associated Diagnoses: Non-STEMI (non-ST elevated myocardial infarction) (H)      lisinopril (PRINIVIL/ZESTRIL) 40 MG tablet Take 1 tablet (40 mg) by mouth daily  Qty: 90 tablet, Refills: 1    Associated Diagnoses: Essential hypertension      metoprolol succinate ER (TOPROL-XL) 100 MG 24 hr tablet Take 1 tablet (100 mg) by mouth daily  Qty: 90 tablet, Refills: 1    Associated Diagnoses: Essential hypertension      Multiple Vitamins-Minerals (PRESERVISION AREDS) CAPS Take 1 capsule by mouth 2 times daily      sertraline (ZOLOFT) 100 MG tablet Take 1 tablet (100 mg) by mouth daily  Qty: 90 tablet, Refills: 1    Associated Diagnoses: Generalized anxiety disorder      vitamin C (ASCORBIC ACID) 500 MG tablet Take 500 mg by mouth daily       !! - Potential duplicate medications found. Please discuss with provider.            Allergies:         Allergies   Allergen Reactions     Atorvastatin Muscle Pain (Myalgia)     Macrobid [Nitrofurantoin Anhydrous] Other (See Comments)     Body aches     Sulfa Drugs            Consultations This Hospital Stay:      No consultations were requested during this admission         Condition and Physical on Discharge:      Discharge condition: Stable   Vitals: Blood pressure 139/57, pulse 62, temperature 97.9  F (36.6  C), temperature source Oral, resp. rate 16, height 1.575 m (5' 2\"), weight 58.2 kg (128 lb 4.8 oz), SpO2 96 %, not currently breastfeeding.     Constitutional:  Pleasant no acute distress looks less than stated age sclera clear   Lungs:  Clear to auscultation normal effort   Cardiovascular:  Regular rate and rhythm without murmurs rubs or gallops   Abdomen:  Soft nontender nondistended   Skin:  Warm dry no cyanosis or " clubbing of the extremities   Other:  Affect appropriate she is alert and oriented and ambulating without difficulty         Discharge Time:      Greater than 30 minutes.        Image Results From This Hospital Stay (For Non-EPIC Providers):        Results for orders placed or performed during the hospital encounter of 05/26/19   Head CT w/o contrast    Narrative    CT SCAN OF THE HEAD WITHOUT CONTRAST   5/26/2019 4:34 PM     HISTORY: Multiple falls at home.    TECHNIQUE:  Axial images of the head and coronal reformations without  IV contrast material. Radiation dose for this scan was reduced using  automated exposure control, adjustment of the mA and/or kV according  to patient size, or iterative reconstruction technique.    COMPARISON: Head CT 5/28/2017.    FINDINGS: There is no evidence of intracranial hemorrhage, mass, acute  infarct or anomaly. The ventricles are normal in size, shape and  configuration. Mild diffuse parenchymal volume loss. Mild patchy  periventricular white matter hypodensities which are nonspecific, but  likely related to chronic microvascular ischemic disease.     The visualized portions of the sinuses and mastoids appear normal. The  bony calvarium and bones of the skull base appear intact.       Impression    IMPRESSION:     1. No evidence of acute intracranial hemorrhage, mass, or herniation.  2. Mild diffuse parenchymal volume loss and white matter changes  likely due to chronic microvascular ischemic disease.    IDA CARLSON MD   XR Knee Right 3 Views    Narrative    XR KNEE RT 3 VW 5/27/2019 12:34 PM    COMPARISON: None.    HISTORY: Fall with RIGHT knee pain.      Impression    IMPRESSION: Osteopenia about the RIGHT knee. There is a small knee  effusion, but no fractures are identified. Joint spaces are preserved.    VANI SOTO MD           Most Recent Lab Results In EPIC (For Non-EPIC Providers):    Most Recent 3 CBC's:  Recent Labs   Lab Test 05/31/19  1040 05/27/19  0627  05/26/19  1531   WBC 6.0 8.0 17.6*   HGB 11.1* 11.3* 13.1   MCV 96 97 93    123* 199      Most Recent 3 BMP's:  Recent Labs   Lab Test 05/31/19  1040 05/28/19  0715 05/27/19  0627  05/26/19  1531     --  143  --  141   POTASSIUM 4.2 4.1 4.5   < > 3.1*   CHLORIDE 105  --  114*  --  106   CO2 30  --  24  --  27   BUN 17  --  29  --  47*   CR 0.83  --  0.68  --  1.11*   ANIONGAP 4  --  5  --  8   DAVE 9.0  --  8.4*  --  8.8   GLC 99  --  96  --  94    < > = values in this interval not displayed.     Most Recent 2 LFT's:  Recent Labs   Lab Test 05/26/19  1531 02/19/19  1022   AST 24 13   ALT 17 15   ALKPHOS 83 65   BILITOTAL 0.6 0.7     Most Recent Cholesterol Panel:  Recent Labs   Lab Test 02/19/19  1022   CHOL 176   *   HDL 49*   TRIG 95     Most Recent 6 Bacteria Isolates From Any Culture (See EPIC Reports for Culture Details):  Recent Labs   Lab Test 05/26/19  1820 05/26/19  1813 05/26/19  1703 09/06/17  1525 08/11/17  1005 06/28/17  1830   CULT No growth after 5 days No growth after 5 days 50,000 to 100,000 colonies/mL  Escherichia coli ESBL  *  Critical Value/Significant Value called to and read back by  VEE DUNLAP RN ([email or web address]).  05.28.19 2112 GJS    Enterobacteriaceae that are susceptible to meropenem are usually susceptible to ertapenem.  ESBL (extended beta lactamase) producing organisms require contact precautions. >100,000 colonies/mL  mixed urogenital jorge   >100,000 colonies/mL Enterococcus faecalis  <10,000 colonies/mL mixed urogenital jorge Susceptibility testing not routinely   done  * 10,000 to 50,000 colonies/mL urogenital jorge  Susceptibility testing not routinely done

## 2019-05-31 NOTE — PROGRESS NOTES
DX :    UTI  HX :HTN, Macular degeneration, fall with vertebral fracture,   LABS : K 4.1,  Maikel 8.4  TELE:   SR/ SB/ Prolonged QT, 1st degree AV Block..  GI/ : Continent of B&B, voids spontaneously with out difficulties.  DIET : Regular diet  ASSESS; A&OX 3, disoriented to time. Assist of 1 with gait belt and walker for transferring, afebrile. started on Bactrim  Oral for UTI.   PAIN : C/o of pain to bilateral knee. Oxycodone given X1.  ACTIVITY :  Up with assist of 1  TEACHING : use call button for help and reinforced the use of incentive spirometer.  PLAN FOR DISCHARGE : Tomorrow to home.

## 2019-06-01 LAB
BACTERIA SPEC CULT: NO GROWTH
BACTERIA SPEC CULT: NO GROWTH
Lab: NORMAL
Lab: NORMAL
SPECIMEN SOURCE: NORMAL
SPECIMEN SOURCE: NORMAL

## 2019-06-04 ENCOUNTER — TELEPHONE (OUTPATIENT)
Dept: NURSING | Facility: CLINIC | Age: 84
End: 2019-06-04

## 2019-06-04 ENCOUNTER — DOCUMENTATION ONLY (OUTPATIENT)
Dept: CARE COORDINATION | Facility: CLINIC | Age: 84
End: 2019-06-04

## 2019-06-04 NOTE — PROGRESS NOTES
[facility] now requests orders and shares plan of care/discharge summaries for some patients through [employer].  Please REPLY TO THIS MESSAGE OR ROUTE BACK TO THE AUTHOR in order to give authorization for orders when needed.  This is considered a verbal order, you will still receive a faxed copy of orders for signature.  Thank you for your assistance in improving collaboration for our patients.    ORDER:  PT eval completed 6/4/19 s/p hosp. For UTI and vertebral compression fx after fall    Request PT treat 2 times/week for 3 weeks (includes this visit) with gait, balance, transfer, and stair training, therapeutic exercises and/or home exercise and safety training.     Fela Mijares PT  [facility]  [email or web address]  [phone]

## 2019-06-04 NOTE — TELEPHONE ENCOUNTER
"[facility] Physical Therapist calling because she noted today that patient had a heart rate in the upper 40's. Patient was asymptomatic with a \"good\" BP. I noted in the record that this patient has had HR of 50 in September 2018, and 51 in February 2019 so and she takes beta blockers. Advised that I would note this HR in the EMR and call FNA or PCP for further qustions or issues. Patient lives alone in [facility] with her son checking on her frequently.   Sydney Hagan RN  [facility]    "

## 2019-06-10 ENCOUNTER — HOSPITAL ENCOUNTER (INPATIENT)
Facility: CLINIC | Age: 84
LOS: 6 days | Discharge: HOME HEALTH | DRG: 371 | End: 2019-06-16
Attending: EMERGENCY MEDICINE | Admitting: INTERNAL MEDICINE
Payer: MEDICARE

## 2019-06-10 ENCOUNTER — TELEPHONE (OUTPATIENT)
Dept: FAMILY MEDICINE | Facility: CLINIC | Age: 84
End: 2019-06-10

## 2019-06-10 DIAGNOSIS — N39.0 UTI (URINARY TRACT INFECTION) WITH PYURIA: ICD-10-CM

## 2019-06-10 DIAGNOSIS — R53.1 GENERALIZED WEAKNESS: ICD-10-CM

## 2019-06-10 DIAGNOSIS — A04.72 C. DIFFICILE COLITIS: Primary | ICD-10-CM

## 2019-06-10 DIAGNOSIS — Z86.19 HISTORY OF ESBL E. COLI INFECTION: ICD-10-CM

## 2019-06-10 LAB
ALBUMIN UR-MCNC: 20 MG/DL
ANION GAP SERPL CALCULATED.3IONS-SCNC: 7 MMOL/L (ref 3–14)
APPEARANCE UR: ABNORMAL
BACTERIA #/AREA URNS HPF: ABNORMAL /HPF
BASOPHILS # BLD AUTO: 0 10E9/L (ref 0–0.2)
BASOPHILS NFR BLD AUTO: 0.4 %
BILIRUB UR QL STRIP: NEGATIVE
BUN SERPL-MCNC: 14 MG/DL (ref 7–30)
CALCIUM SERPL-MCNC: 8.7 MG/DL (ref 8.5–10.1)
CHLORIDE SERPL-SCNC: 106 MMOL/L (ref 94–109)
CO2 BLDCOV-SCNC: 25 MMOL/L (ref 21–28)
CO2 SERPL-SCNC: 25 MMOL/L (ref 20–32)
COLOR UR AUTO: ABNORMAL
CREAT SERPL-MCNC: 0.71 MG/DL (ref 0.52–1.04)
DIFFERENTIAL METHOD BLD: NORMAL
EOSINOPHIL # BLD AUTO: 0.1 10E9/L (ref 0–0.7)
EOSINOPHIL NFR BLD AUTO: 1 %
ERYTHROCYTE [DISTWIDTH] IN BLOOD BY AUTOMATED COUNT: 13.8 % (ref 10–15)
GFR SERPL CREATININE-BSD FRML MDRD: 76 ML/MIN/{1.73_M2}
GLUCOSE SERPL-MCNC: 124 MG/DL (ref 70–99)
GLUCOSE UR STRIP-MCNC: NEGATIVE MG/DL
HCT VFR BLD AUTO: 39.8 % (ref 35–47)
HGB BLD-MCNC: 12.9 G/DL (ref 11.7–15.7)
HGB UR QL STRIP: ABNORMAL
IMM GRANULOCYTES # BLD: 0.1 10E9/L (ref 0–0.4)
IMM GRANULOCYTES NFR BLD: 0.7 %
KETONES UR STRIP-MCNC: NEGATIVE MG/DL
LACTATE BLD-SCNC: 2.8 MMOL/L (ref 0.7–2.1)
LEUKOCYTE ESTERASE UR QL STRIP: ABNORMAL
LYMPHOCYTES # BLD AUTO: 1 10E9/L (ref 0.8–5.3)
LYMPHOCYTES NFR BLD AUTO: 12.5 %
MCH RBC QN AUTO: 30.6 PG (ref 26.5–33)
MCHC RBC AUTO-ENTMCNC: 32.4 G/DL (ref 31.5–36.5)
MCV RBC AUTO: 95 FL (ref 78–100)
MONOCYTES # BLD AUTO: 0.6 10E9/L (ref 0–1.3)
MONOCYTES NFR BLD AUTO: 6.8 %
NEUTROPHILS # BLD AUTO: 6.3 10E9/L (ref 1.6–8.3)
NEUTROPHILS NFR BLD AUTO: 78.6 %
NITRATE UR QL: NEGATIVE
NRBC # BLD AUTO: 0 10*3/UL
NRBC BLD AUTO-RTO: 0 /100
PCO2 BLDV: 49 MM HG (ref 40–50)
PH BLDV: 7.31 PH (ref 7.32–7.43)
PH UR STRIP: 7.5 PH (ref 5–7)
PLATELET # BLD AUTO: 213 10E9/L (ref 150–450)
PO2 BLDV: 14 MM HG (ref 25–47)
POTASSIUM SERPL-SCNC: 4.2 MMOL/L (ref 3.4–5.3)
RBC # BLD AUTO: 4.21 10E12/L (ref 3.8–5.2)
RBC #/AREA URNS AUTO: 10 /HPF (ref 0–2)
SAO2 % BLDV FROM PO2: 15 %
SODIUM SERPL-SCNC: 138 MMOL/L (ref 133–144)
SOURCE: ABNORMAL
SP GR UR STRIP: 1.01 (ref 1–1.03)
UROBILINOGEN UR STRIP-MCNC: NORMAL MG/DL (ref 0–2)
WBC # BLD AUTO: 8 10E9/L (ref 4–11)
WBC #/AREA URNS AUTO: >182 /HPF (ref 0–5)
WBC CLUMPS #/AREA URNS HPF: PRESENT /HPF

## 2019-06-10 PROCEDURE — 87088 URINE BACTERIA CULTURE: CPT | Performed by: EMERGENCY MEDICINE

## 2019-06-10 PROCEDURE — 25000128 H RX IP 250 OP 636: Performed by: EMERGENCY MEDICINE

## 2019-06-10 PROCEDURE — 83605 ASSAY OF LACTIC ACID: CPT

## 2019-06-10 PROCEDURE — 82803 BLOOD GASES ANY COMBINATION: CPT

## 2019-06-10 PROCEDURE — 80048 BASIC METABOLIC PNL TOTAL CA: CPT | Performed by: EMERGENCY MEDICINE

## 2019-06-10 PROCEDURE — 85025 COMPLETE CBC W/AUTO DIFF WBC: CPT | Performed by: EMERGENCY MEDICINE

## 2019-06-10 PROCEDURE — 36415 COLL VENOUS BLD VENIPUNCTURE: CPT | Performed by: INTERNAL MEDICINE

## 2019-06-10 PROCEDURE — 99285 EMERGENCY DEPT VISIT HI MDM: CPT

## 2019-06-10 PROCEDURE — 83605 ASSAY OF LACTIC ACID: CPT | Performed by: INTERNAL MEDICINE

## 2019-06-10 PROCEDURE — 36415 COLL VENOUS BLD VENIPUNCTURE: CPT | Performed by: EMERGENCY MEDICINE

## 2019-06-10 PROCEDURE — 87040 BLOOD CULTURE FOR BACTERIA: CPT | Performed by: EMERGENCY MEDICINE

## 2019-06-10 PROCEDURE — 12000000 ZZH R&B MED SURG/OB

## 2019-06-10 PROCEDURE — 81001 URINALYSIS AUTO W/SCOPE: CPT | Performed by: EMERGENCY MEDICINE

## 2019-06-10 PROCEDURE — 87186 SC STD MICRODIL/AGAR DIL: CPT | Performed by: EMERGENCY MEDICINE

## 2019-06-10 PROCEDURE — 99223 1ST HOSP IP/OBS HIGH 75: CPT | Mod: AI | Performed by: INTERNAL MEDICINE

## 2019-06-10 PROCEDURE — 87086 URINE CULTURE/COLONY COUNT: CPT | Performed by: EMERGENCY MEDICINE

## 2019-06-10 RX ORDER — GABAPENTIN 400 MG/1
1600 CAPSULE ORAL AT BEDTIME
Status: DISCONTINUED | OUTPATIENT
Start: 2019-06-11 | End: 2019-06-16 | Stop reason: HOSPADM

## 2019-06-10 RX ORDER — AMLODIPINE BESYLATE 10 MG/1
10 TABLET ORAL DAILY
Status: DISCONTINUED | OUTPATIENT
Start: 2019-06-11 | End: 2019-06-16 | Stop reason: HOSPADM

## 2019-06-10 RX ORDER — SERTRALINE HYDROCHLORIDE 100 MG/1
100 TABLET, FILM COATED ORAL DAILY
Status: DISCONTINUED | OUTPATIENT
Start: 2019-06-11 | End: 2019-06-16 | Stop reason: HOSPADM

## 2019-06-10 RX ORDER — ONDANSETRON 2 MG/ML
4 INJECTION INTRAMUSCULAR; INTRAVENOUS EVERY 6 HOURS PRN
Status: DISCONTINUED | OUTPATIENT
Start: 2019-06-10 | End: 2019-06-16 | Stop reason: HOSPADM

## 2019-06-10 RX ORDER — NALOXONE HYDROCHLORIDE 0.4 MG/ML
.1-.4 INJECTION, SOLUTION INTRAMUSCULAR; INTRAVENOUS; SUBCUTANEOUS
Status: DISCONTINUED | OUTPATIENT
Start: 2019-06-10 | End: 2019-06-16 | Stop reason: HOSPADM

## 2019-06-10 RX ORDER — CALCIUM CARBONATE 500(1250)
500 TABLET ORAL 2 TIMES DAILY
Status: DISCONTINUED | OUTPATIENT
Start: 2019-06-11 | End: 2019-06-16 | Stop reason: HOSPADM

## 2019-06-10 RX ORDER — GABAPENTIN 400 MG/1
800 CAPSULE ORAL
Status: DISCONTINUED | OUTPATIENT
Start: 2019-06-11 | End: 2019-06-16 | Stop reason: HOSPADM

## 2019-06-10 RX ORDER — OXYCODONE HYDROCHLORIDE 5 MG/1
5 TABLET ORAL EVERY 6 HOURS PRN
Status: DISCONTINUED | OUTPATIENT
Start: 2019-06-10 | End: 2019-06-16 | Stop reason: HOSPADM

## 2019-06-10 RX ORDER — ONDANSETRON 4 MG/1
4 TABLET, ORALLY DISINTEGRATING ORAL EVERY 6 HOURS PRN
Status: DISCONTINUED | OUTPATIENT
Start: 2019-06-10 | End: 2019-06-16 | Stop reason: HOSPADM

## 2019-06-10 RX ORDER — LISINOPRIL 40 MG/1
40 TABLET ORAL DAILY
Status: DISCONTINUED | OUTPATIENT
Start: 2019-06-11 | End: 2019-06-16 | Stop reason: HOSPADM

## 2019-06-10 RX ORDER — ISOSORBIDE DINITRATE 5 MG/1
10 TABLET ORAL 3 TIMES DAILY
Status: DISCONTINUED | OUTPATIENT
Start: 2019-06-11 | End: 2019-06-16 | Stop reason: HOSPADM

## 2019-06-10 RX ORDER — MEROPENEM 1 G/1
1 INJECTION, POWDER, FOR SOLUTION INTRAVENOUS EVERY 12 HOURS
Status: DISCONTINUED | OUTPATIENT
Start: 2019-06-10 | End: 2019-06-11

## 2019-06-10 RX ORDER — ASPIRIN 81 MG/1
81 TABLET ORAL DAILY
Status: DISCONTINUED | OUTPATIENT
Start: 2019-06-11 | End: 2019-06-11

## 2019-06-10 RX ORDER — METOPROLOL SUCCINATE 100 MG/1
100 TABLET, EXTENDED RELEASE ORAL DAILY
Status: DISCONTINUED | OUTPATIENT
Start: 2019-06-11 | End: 2019-06-16 | Stop reason: HOSPADM

## 2019-06-10 RX ORDER — CLONIDINE HYDROCHLORIDE 0.1 MG/1
0.1 TABLET ORAL 2 TIMES DAILY
Status: DISCONTINUED | OUTPATIENT
Start: 2019-06-10 | End: 2019-06-16 | Stop reason: HOSPADM

## 2019-06-10 RX ADMIN — SODIUM CHLORIDE 1000 ML: 9 INJECTION, SOLUTION INTRAVENOUS at 22:10

## 2019-06-10 ASSESSMENT — ENCOUNTER SYMPTOMS
WEAKNESS: 1
VOMITING: 0
SHORTNESS OF BREATH: 0
CHILLS: 0
DYSURIA: 0
HEMATURIA: 0
FREQUENCY: 1
NAUSEA: 0
BLOOD IN STOOL: 0
FEVER: 0

## 2019-06-10 ASSESSMENT — MIFFLIN-ST. JEOR: SCORE: 957.06

## 2019-06-10 NOTE — TELEPHONE ENCOUNTER
Woke up this am and had about 5 BM's and then that stopped.  Urirnating every 5--10 minutes now since early this am.  Denies burning or urgency just frequency.  Feels she has a fever.  States so weak she would not be able to get into a car.  Discussed ER if that weak.  Was just to ER 6/10/19 for UTI and other issues.  Has a few Bactrim left.  Discussed and advised ER.  States son still at work but will call him to bring her to ER.  Beba Duke RN

## 2019-06-10 NOTE — LETTER
Transition Communication Hand-off for Care Transitions to Next Level of Care Provider    Name: Daya Ignacio  : 10/11/1930  MRN #: [id number]  Primary Care Provider: Marcy Tran  Primary Care MD Name: Dr Tran  Primary Clinic: [id number] [facility] [address]  Primary Care Clinic Name: [facility]  Reason for Hospitalization:  Generalized weakness [R53.1]  History of ESBL E. coli infection [Z86.19]  UTI (urinary tract infection) with pyuria [N39.0]  Admit Date/Time: 6/10/2019  7:07 PM  Discharge Date:   Payor Source: Payor: MEDICARE / Plan: MEDICARE / Product Type: Medicare /     Readmission Assessment Measure (EMI) Risk Score/category: Elevated    Reason for Communication Hand-off Referral: Avoidable readmission within 30 days    Discharge Plan: Return to the [facility] with [facility] RN/PT    Discharge Needs Assessment:  Needs      Most Recent Value   # of Referrals Placed by CTS  Communication hand-offs to next level of Care Providers        Follow-up plan:  No future appointments.    Any outstanding tests or procedures:        Referrals     Future Labs/Procedures    Home care nursing referral     Comments:    RN skilled nursing visit. RN to assess vital signs and weight, respiratory and cardiac status and patients ability to take and record daily blood pressure, temp and weight.    Your provider has ordered home care nursing services. If you have not been contacted within 2 days of your discharge please call the inpatient department phone number at [phone] .    Home Care PT Referral for Hospital Discharge     Comments:    PT to eval and treat    Your provider has ordered home care - physical therapy. If you have not been contacted within 2 days of your discharge please call the department phone number listed on the top of this document.              Key Recommendations:  FYI pt readmission:  CTS following for readmission with average EMI score. Pt was admitted from - for ESBL UTI and  is now again readmitted with UTI, GIB. She lives at the [facility] where she receives no services. She was discharged home last hospitalization with bactrim and [facility] RN/PT. She is currently being followed by ID and treated with IV abx. She is also being followed by GI for suspected lower GIB. She is cdiff positive and has been started on PO vanco. Per MD note, she is expected to discharge back to her ILF once her bleeding is resolve and hgb stable.     She was dc'd again with [facility] RN/PT orders. Her new medication is Vancomycin PO. She should stop taking advil and ASA. She is recommended to follow up in 1 week with CBC.     Alberta Rojas    AVS/Discharge Summary is the source of truth; this is a helpful guide for improved communication of patient story

## 2019-06-10 NOTE — LETTER
Key Recommendations:  FYI pt readmission:  CTS following for readmission with average EMI score. Pt was admitted from 5/26-5/31 for ESBL UTI and is now again readmitted with UTI, GIB. She lives at the [facility] where she receives no services. She was discharged home last hospitalization with bactrim and [facility] RN/PT. She is currently being followed by ID and treated with IV abx. She is also being followed by GI for suspected lower GIB. She is cdiff positive and has been started on PO vanco. Per MD note, she is expected to discharge back to her ILF once her bleeding is resolve and hgb stable.     Alberta Rojas    AVS/Discharge Summary is the source of truth; this is a helpful guide for improved communication of patient story

## 2019-06-11 LAB
ANION GAP SERPL CALCULATED.3IONS-SCNC: 5 MMOL/L (ref 3–14)
BUN SERPL-MCNC: 15 MG/DL (ref 7–30)
C DIFF TOX B STL QL: POSITIVE
CALCIUM SERPL-MCNC: 8.4 MG/DL (ref 8.5–10.1)
CHLORIDE SERPL-SCNC: 107 MMOL/L (ref 94–109)
CO2 SERPL-SCNC: 27 MMOL/L (ref 20–32)
CREAT SERPL-MCNC: 0.61 MG/DL (ref 0.52–1.04)
ERYTHROCYTE [DISTWIDTH] IN BLOOD BY AUTOMATED COUNT: 14.1 % (ref 10–15)
GFR SERPL CREATININE-BSD FRML MDRD: 81 ML/MIN/{1.73_M2}
GLUCOSE SERPL-MCNC: 96 MG/DL (ref 70–99)
HCT VFR BLD AUTO: 38.9 % (ref 35–47)
HGB BLD-MCNC: 12.3 G/DL (ref 11.7–15.7)
HGB BLD-MCNC: 8 G/DL (ref 11.7–15.7)
HGB BLD-MCNC: 9 G/DL (ref 11.7–15.7)
HGB BLD-MCNC: 9.1 G/DL (ref 11.7–15.7)
LACTATE BLD-SCNC: 1.7 MMOL/L (ref 0.7–2)
MCH RBC QN AUTO: 30.2 PG (ref 26.5–33)
MCHC RBC AUTO-ENTMCNC: 31.6 G/DL (ref 31.5–36.5)
MCV RBC AUTO: 96 FL (ref 78–100)
PLATELET # BLD AUTO: 213 10E9/L (ref 150–450)
POTASSIUM SERPL-SCNC: 3.6 MMOL/L (ref 3.4–5.3)
RBC # BLD AUTO: 4.07 10E12/L (ref 3.8–5.2)
SODIUM SERPL-SCNC: 139 MMOL/L (ref 133–144)
SPECIMEN SOURCE: ABNORMAL
UPPER GI ENDOSCOPY: NORMAL
WBC # BLD AUTO: 6.9 10E9/L (ref 4–11)

## 2019-06-11 PROCEDURE — 25000128 H RX IP 250 OP 636: Performed by: INTERNAL MEDICINE

## 2019-06-11 PROCEDURE — 85018 HEMOGLOBIN: CPT | Performed by: INTERNAL MEDICINE

## 2019-06-11 PROCEDURE — 99232 SBSQ HOSP IP/OBS MODERATE 35: CPT | Performed by: INTERNAL MEDICINE

## 2019-06-11 PROCEDURE — A9270 NON-COVERED ITEM OR SERVICE: HCPCS | Mod: GY | Performed by: INTERNAL MEDICINE

## 2019-06-11 PROCEDURE — 80048 BASIC METABOLIC PNL TOTAL CA: CPT | Performed by: INTERNAL MEDICINE

## 2019-06-11 PROCEDURE — 86900 BLOOD TYPING SEROLOGIC ABO: CPT | Performed by: INTERNAL MEDICINE

## 2019-06-11 PROCEDURE — 12000000 ZZH R&B MED SURG/OB

## 2019-06-11 PROCEDURE — 25000125 ZZHC RX 250: Performed by: INTERNAL MEDICINE

## 2019-06-11 PROCEDURE — 86901 BLOOD TYPING SEROLOGIC RH(D): CPT | Performed by: INTERNAL MEDICINE

## 2019-06-11 PROCEDURE — 87493 C DIFF AMPLIFIED PROBE: CPT | Performed by: INTERNAL MEDICINE

## 2019-06-11 PROCEDURE — 40000104 ZZH STATISTIC MODERATE SEDATION < 10 MIN: Performed by: INTERNAL MEDICINE

## 2019-06-11 PROCEDURE — 86850 RBC ANTIBODY SCREEN: CPT | Performed by: INTERNAL MEDICINE

## 2019-06-11 PROCEDURE — C9113 INJ PANTOPRAZOLE SODIUM, VIA: HCPCS | Performed by: INTERNAL MEDICINE

## 2019-06-11 PROCEDURE — 85027 COMPLETE CBC AUTOMATED: CPT | Performed by: INTERNAL MEDICINE

## 2019-06-11 PROCEDURE — 25800030 ZZH RX IP 258 OP 636: Performed by: INTERNAL MEDICINE

## 2019-06-11 PROCEDURE — 43235 EGD DIAGNOSTIC BRUSH WASH: CPT | Performed by: INTERNAL MEDICINE

## 2019-06-11 PROCEDURE — 86923 COMPATIBILITY TEST ELECTRIC: CPT | Performed by: INTERNAL MEDICINE

## 2019-06-11 PROCEDURE — 25000132 ZZH RX MED GY IP 250 OP 250 PS 637: Mod: GY | Performed by: INTERNAL MEDICINE

## 2019-06-11 PROCEDURE — 36415 COLL VENOUS BLD VENIPUNCTURE: CPT | Performed by: INTERNAL MEDICINE

## 2019-06-11 PROCEDURE — 0DJ08ZZ INSPECTION OF UPPER INTESTINAL TRACT, VIA NATURAL OR ARTIFICIAL OPENING ENDOSCOPIC: ICD-10-PCS | Performed by: INTERNAL MEDICINE

## 2019-06-11 RX ORDER — LIDOCAINE 40 MG/G
CREAM TOPICAL
Status: DISCONTINUED | OUTPATIENT
Start: 2019-06-11 | End: 2019-06-11 | Stop reason: HOSPADM

## 2019-06-11 RX ORDER — FENTANYL CITRATE 50 UG/ML
25 INJECTION, SOLUTION INTRAMUSCULAR; INTRAVENOUS EVERY 5 MIN PRN
Status: ACTIVE | OUTPATIENT
Start: 2019-06-11 | End: 2019-06-12

## 2019-06-11 RX ORDER — SODIUM CHLORIDE 9 MG/ML
INJECTION, SOLUTION INTRAVENOUS CONTINUOUS
Status: DISCONTINUED | OUTPATIENT
Start: 2019-06-11 | End: 2019-06-14

## 2019-06-11 RX ORDER — FLUMAZENIL 0.1 MG/ML
0.2 INJECTION, SOLUTION INTRAVENOUS
Status: ACTIVE | OUTPATIENT
Start: 2019-06-11 | End: 2019-06-13

## 2019-06-11 RX ORDER — VANCOMYCIN HYDROCHLORIDE 50 MG/ML
125 KIT ORAL 4 TIMES DAILY
Status: DISCONTINUED | OUTPATIENT
Start: 2019-06-11 | End: 2019-06-16 | Stop reason: HOSPADM

## 2019-06-11 RX ORDER — MEROPENEM 1 G/1
1 INJECTION, POWDER, FOR SOLUTION INTRAVENOUS EVERY 8 HOURS
Status: DISCONTINUED | OUTPATIENT
Start: 2019-06-11 | End: 2019-06-15

## 2019-06-11 RX ORDER — FENTANYL CITRATE 50 UG/ML
INJECTION, SOLUTION INTRAMUSCULAR; INTRAVENOUS PRN
Status: DISCONTINUED | OUTPATIENT
Start: 2019-06-11 | End: 2019-06-11 | Stop reason: HOSPADM

## 2019-06-11 RX ORDER — FLUMAZENIL 0.1 MG/ML
0.2 INJECTION, SOLUTION INTRAVENOUS
Status: DISCONTINUED | OUTPATIENT
Start: 2019-06-11 | End: 2019-06-11 | Stop reason: HOSPADM

## 2019-06-11 RX ORDER — FENTANYL CITRATE 50 UG/ML
25 INJECTION, SOLUTION INTRAMUSCULAR; INTRAVENOUS
Status: DISCONTINUED | OUTPATIENT
Start: 2019-06-11 | End: 2019-06-11 | Stop reason: HOSPADM

## 2019-06-11 RX ORDER — NALOXONE HYDROCHLORIDE 0.4 MG/ML
.1-.4 INJECTION, SOLUTION INTRAMUSCULAR; INTRAVENOUS; SUBCUTANEOUS
Status: ACTIVE | OUTPATIENT
Start: 2019-06-11 | End: 2019-06-13

## 2019-06-11 RX ORDER — FENTANYL CITRATE 50 UG/ML
25-50 INJECTION, SOLUTION INTRAMUSCULAR; INTRAVENOUS
Status: DISCONTINUED | OUTPATIENT
Start: 2019-06-11 | End: 2019-06-11 | Stop reason: HOSPADM

## 2019-06-11 RX ORDER — NALOXONE HYDROCHLORIDE 0.4 MG/ML
.1-.4 INJECTION, SOLUTION INTRAMUSCULAR; INTRAVENOUS; SUBCUTANEOUS
Status: DISCONTINUED | OUTPATIENT
Start: 2019-06-11 | End: 2019-06-11 | Stop reason: HOSPADM

## 2019-06-11 RX ORDER — FENTANYL CITRATE 50 UG/ML
50 INJECTION, SOLUTION INTRAMUSCULAR; INTRAVENOUS
Status: ACTIVE | OUTPATIENT
Start: 2019-06-11 | End: 2019-06-12

## 2019-06-11 RX ADMIN — CLONIDINE HYDROCHLORIDE 0.1 MG: 0.1 TABLET ORAL at 00:30

## 2019-06-11 RX ADMIN — CLONIDINE HYDROCHLORIDE 0.1 MG: 0.1 TABLET ORAL at 09:04

## 2019-06-11 RX ADMIN — ISOSORBIDE DINITRATE 10 MG: 5 TABLET ORAL at 16:02

## 2019-06-11 RX ADMIN — MEROPENEM 1 G: 1 INJECTION, POWDER, FOR SOLUTION INTRAVENOUS at 00:20

## 2019-06-11 RX ADMIN — GABAPENTIN 1600 MG: 400 CAPSULE ORAL at 21:43

## 2019-06-11 RX ADMIN — ISOSORBIDE DINITRATE 10 MG: 5 TABLET ORAL at 09:04

## 2019-06-11 RX ADMIN — SODIUM CHLORIDE: 900 INJECTION INTRAVENOUS at 12:39

## 2019-06-11 RX ADMIN — PANTOPRAZOLE SODIUM 40 MG: 40 INJECTION, POWDER, FOR SOLUTION INTRAVENOUS at 20:29

## 2019-06-11 RX ADMIN — MELATONIN 2000 UNITS: at 16:16

## 2019-06-11 RX ADMIN — SERTRALINE HYDROCHLORIDE 100 MG: 100 TABLET ORAL at 09:03

## 2019-06-11 RX ADMIN — SODIUM CHLORIDE 1000 ML: 9 INJECTION, SOLUTION INTRAVENOUS at 00:21

## 2019-06-11 RX ADMIN — AMLODIPINE BESYLATE 10 MG: 10 TABLET ORAL at 09:03

## 2019-06-11 RX ADMIN — ASPIRIN 81 MG: 81 TABLET, COATED ORAL at 09:03

## 2019-06-11 RX ADMIN — METOPROLOL SUCCINATE 100 MG: 100 TABLET, EXTENDED RELEASE ORAL at 09:03

## 2019-06-11 RX ADMIN — ONDANSETRON 4 MG: 2 INJECTION INTRAMUSCULAR; INTRAVENOUS at 12:20

## 2019-06-11 RX ADMIN — ACETAMINOPHEN AND CODEINE PHOSPHATE 2 TABLET: 300; 30 TABLET ORAL at 00:01

## 2019-06-11 RX ADMIN — VANCOMYCIN HYDROCHLORIDE 125 MG: KIT at 21:49

## 2019-06-11 RX ADMIN — CALCIUM 500 MG: 500 TABLET ORAL at 09:03

## 2019-06-11 RX ADMIN — GABAPENTIN 1600 MG: 400 CAPSULE ORAL at 00:30

## 2019-06-11 RX ADMIN — ISOSORBIDE DINITRATE 10 MG: 5 TABLET ORAL at 21:43

## 2019-06-11 RX ADMIN — CALCIUM 500 MG: 500 TABLET ORAL at 20:29

## 2019-06-11 RX ADMIN — PANTOPRAZOLE SODIUM 40 MG: 40 INJECTION, POWDER, FOR SOLUTION INTRAVENOUS at 15:22

## 2019-06-11 RX ADMIN — LISINOPRIL 40 MG: 40 TABLET ORAL at 09:03

## 2019-06-11 RX ADMIN — GABAPENTIN 800 MG: 400 CAPSULE ORAL at 16:16

## 2019-06-11 RX ADMIN — CLONIDINE HYDROCHLORIDE 0.1 MG: 0.1 TABLET ORAL at 20:29

## 2019-06-11 RX ADMIN — MEROPENEM 1 G: 1 INJECTION, POWDER, FOR SOLUTION INTRAVENOUS at 09:02

## 2019-06-11 RX ADMIN — MELATONIN 2000 UNITS: at 00:30

## 2019-06-11 RX ADMIN — VANCOMYCIN HYDROCHLORIDE 125 MG: KIT at 18:27

## 2019-06-11 RX ADMIN — MEROPENEM 1 G: 1 INJECTION, POWDER, FOR SOLUTION INTRAVENOUS at 16:16

## 2019-06-11 RX ADMIN — GABAPENTIN 800 MG: 400 CAPSULE ORAL at 09:05

## 2019-06-11 ASSESSMENT — ACTIVITIES OF DAILY LIVING (ADL)
ADLS_ACUITY_SCORE: 14
ADLS_ACUITY_SCORE: 14
ADLS_ACUITY_SCORE: 16
ADLS_ACUITY_SCORE: 14

## 2019-06-11 ASSESSMENT — MIFFLIN-ST. JEOR
SCORE: 928.03
SCORE: 925.3

## 2019-06-11 NOTE — PLAN OF CARE
A & O x 4. Up to bathroom frequently. Continues on IV antibiotics. Had emesis this am 2.5 hours after breakfast. At 1225 had large amount of bloody stools with clots. Became lightheaded while on the toilet. MD notified. GI consult ordered. Dr. Degroot in and performed an Upper endoscopy, negative. Hgb 12.3, repeat 9.1.

## 2019-06-11 NOTE — PROVIDER NOTIFICATION
1030: Dr. Stevens notified of large emesis, compazine ordered. Received zofran with relief.  1235: Dr. Stevens notified of large amount of bloody stool with clots. Spec. Sent. GI consult ordered. Dr. Stevens saw patient.

## 2019-06-11 NOTE — CONSULTS
ID consult dictated IM P 1 87 yo female recent ESBL UTI, now apparent failure tx    REc ladi , await cx

## 2019-06-11 NOTE — CONSULTS
Consult Date:  06/11/2019      INFECTIOUS DISEASE CONSULTATION      REQUESTING PHYSICIAN:  Dr. Pereira      IMPRESSION:   1.  An 88-year-old female with vague generalized symptoms, malaise, suprapubic discomfort and some urinary symptoms, grossly abnormal urine culture growing E. coli, all occurring in the context of recent ESBL UTI being appropriately treated with Septra, if truly same organism still SULFA sensitive, presumably some anatomic abnormality, no imaging to date.   2.  Recent extended-spectrum beta-lactamases Escherichia coli UTI that was SULFA sensitive being treated with Septra with initial improvement in symptoms.   3.  History of recurrent urinary tract infections, although on careful review previous Urology investigation felt most of her symptoms were not UTI related and rather related to vaginal and nonspecific issues.   4.  Hematochezia is a new GI problem, currently also having diarrhea, I suppose C. diff a consideration.   5.  MACRODANTIN, SULFA listed allergies, tolerated the SULFA when given recently.   6.  Chronic pain syndrome.      RECOMMENDATIONS:   1.  For now on meropenem, await the current culture and readjust.  If the organism is actually still SULFA sensitive, she is quite certain she was taking the medication.  This would imply some anatomic abnormality, consideration of imaging of the urologic system.   2.  If same organism and now sulfa resistant, probably simply treat with a course of ertapenem.   3.  Holding on a bigger workup from Urology standpoint until further information, ongoing GI workup underway.  If she has ongoing diarrhea, I suppose C. diff check.      HISTORY OF PRESENT ILLNESS:  This 88-year-old female is seen in consultation.  She is an excellent historian.  She was recently hospitalized with somewhat vague symptoms, found among other things to have an ESBL UTI and was treated initially with IV antibiotics in the hospital and then Bactrim.  She was ALLERGIC TO SULFA,  but tolerating it without difficulty and did well with that following discharge.  Initially did well, then started noticing general malaise, fatigue and not feeling well the last 48 hours and with that some degree of urinary symptoms.  Importantly, she has had a history of both recurrent UTIs and some chronic urinary symptoms that are probably not UTI related in the past.  She has had a urologic workup that was negative previously by Dr. Caballero.  She currently is denying any fevers, chills or sweats but notably is having hematochezia and presumed GI bleeding for which workup is underway.      PAST MEDICAL HISTORY:  Prior history of recurrent urinary tract infections, although somewhat unclear how many of them were actually UTIs and more chronic urologic symptoms, history of hypertension and hyperlipidemia, prior vision loss.      SOCIAL HISTORY:  No recent travels or exposures.  No one she has been around has been ill.  He a known ESBL organism from recently.      ALLERGIES:  AMONG, THEM, MACRODANTIN AND SULFA, WHICH SHE ENDED UP TOLERATING THE SULFA.      FAMILY HISTORY:  No relevant family history.      REVIEW OF SYSTEMS:  Largely as listed above.  No particular other focal symptoms.  Is having some loose stools, slight crampy abdominal discomfort, and some blood in the stool.      PHYSICAL EXAMINATION:   GENERAL:  The patient appears her stated age.  She is a good historian, not particularly ill looking.   VITAL SIGNS:  Include being afebrile.   HEENT:  No thrush or intraoral lesions.  Pupils reactive.   NECK:  Supple and nontender.   HEART:  Regular rhythm, no particular murmur.   LUNGS:  Clear bilaterally.   ABDOMEN:  Soft, nontender.   EXTREMITIES:  No particular rashes or skin lesions.  Trace of edema.      LABORATORY DATA:  Current urine grossly abnormal with culture growing E. coli on the recent urine culture and abnormal urine during prior hospital stay that grew ESBL E. coli that was SULFA sensitive,  quinolone resistant.      Thank you very much for this consultation.  I will follow the patient with you.         MINI CABELLO MD             D: 2019   T: 2019   MT: JOVANA      Name:     MAGALY MCDANIELS   MRN:      [id number]        Account:       [id number]   :      10/11/1930           Consult Date:  2019      Document: [id number]       cc: Marcy Soares PA-C

## 2019-06-11 NOTE — PROGRESS NOTES
Hemoglobin stabilizing at 9.0.  Repeat hemoglobin check at 10 PM then CBC in the morning.  If no further bloody stools during the day today and vitals stable could advance to clear liquids this evening if patient requests.

## 2019-06-11 NOTE — H&P
[facility]    Hospitalist History and Physical    Name: Daya Ignacio    MRN: [id number]  YOB: 1930    Age: 88 year old  Date of Admission:  6/10/2019  Date of Service (when I saw the patient): 06/10/19    Assessment & Plan   Daya Ignacio is a 88 year old female with past medical history significant for hypertension, macular degeneration, vertebral fracture, right eye retinal vein occlusion and dyslipidemia, recent hospitalization for ESBL UTI discharged home on Bactrim discharged on 5/31/2019 he presented again to the emergency room with subjective fever, weakness, increased urinary frequency and urinary retention.  Evaluated in the emergency room showed abnormal UA and lactic acidosis.  Admitted for failed outpatient treatment of ESBL UTI and dehydration.    ESBL UTI  --Patient was treated with meropenem for 3 days and discharged home on Bactrim.  While on Bactrim she once again developed dysuria increased urinary frequency and urinary retention  --UA in the emergency room was abnormal  --Repeat culture was sent  --Started on meropenem.  We will continue  --Contact isolation  --We will consult infectious disease    Lactic acidosis  -- due to dehydration from loose stools and poor oral intake  --Patient gives history of about 5 loose BM earlier today  --IV fluids  --We will check C. difficile if has recurrent loose stools.   --No episode of loose stools since 11 AM  --IV fluids and then recheck lactic acid levels      HTN  --Prior to admission patient on metoprolol clonidine and lisinopril  --We will resume prior to admission meds    Hyperlipidemia  --Continue statins    DVT Prophylaxis: Pneumatic Compression Devices  Code Status: Full Code    Disposition: Admitted as inpatient  failed outpatient treatment    Primary Care Physician   Marcy Soares    Chief Complaint   Dysuria today  Loose stools x5 today    History is obtained from the patient    History of Present Illness    Daya Ignacio is a 88 year old female with past medical history significant for hypertension, hyperlipidemia, recent hospitalization for ESBL UTI discharged on Bactrim presented to the emergency room with dysuria burning increased urinary frequency low-grade fever nausea poor appetite since today.  Also prior to that patient had  5 episodes of loose stools.    Patient was discharged on Bactrim was doing well until yesterday.  This morning she had about 5 episodes of liquidy brown BMs and then developed dysuria increased urinary frequency lower abdominal discomfort low-grade fever some nausea and generalized malaise and weakness.  She had poor appetite did not feel like eating.  Called ambulance.  Per patient her temperature at that time was 102.  In the emergency room she was afebrile.  Evaluation in the emergency room showed abnormal UA despite her being on antibiotics.  She is being admitted for ESBL UTI as she failed outpatient treatment.  She was also noted to have lactic acidosis.  Which was attributed to dehydration and poor oral intake.    Denies any chest pain shortness of breath minimal lower abdominal discomfort about 2 x 10 localized suprapubic area.  No radiation.  Review of all the other symptoms are negative    Past Medical History    Past Medical History:   Diagnosis Date     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 4/16/2014     Closed fracture of unspecified part of neck of femur      Closed fracture of unspecified part of vertebral column without mention of spinal cord injury     from fall summer 06     Elevated blood pressure reading without diagnosis of hypertension      Hypertension      Macular degeneration (senile) of retina, unspecified          Past Surgical History   Past Surgical History:   Procedure Laterality Date     C NONSPECIFIC PROCEDURE      lt hip pain     ORTHOPEDIC SURGERY         Prior to Admission Medications   Prior to Admission Medications   Prescriptions Last Dose  Informant Patient Reported? Taking?   Calcium Carbonate (CALCIUM 600 PO)   Yes No   Sig: Take 1 tablet by mouth 2 times daily    Cholecalciferol (VITAMIN D3) 2000 UNITS CAPS   Yes No   Sig: Take 2,000 Units by mouth daily (with dinner)   Multiple Vitamins-Minerals (PRESERVISION AREDS) CAPS   Yes No   Sig: Take 1 capsule by mouth 2 times daily   acetaminophen (TYLENOL) 500 MG tablet   No No   Sig: Take 2 tablets (1,000 mg) by mouth every 8 hours for 5 days   acetaminophen-codeine (TYLENOL #3) 300-30 MG tablet   No No   Sig: TAKE ONE TABLET BY MOUTH EVERY 8 HOURS AS NEEDED FOR SEVERE PAIN    Hold this medication while on scheduled tylenol and as needed oxycodone   amLODIPine (NORVASC) 10 MG tablet   No No   Sig: Take 1 tablet (10 mg) by mouth daily   aspirin EC 81 MG EC tablet   No No   Sig: Take 1 tablet (81 mg) by mouth daily   cloNIDine (CATAPRES) 0.1 MG tablet   No No   Sig: TAKE ONE TABLET BY MOUTH TWO TIMES A DAY   estradiol (ESTRACE) 0.1 MG/GM vaginal cream   No No   Sig: Place 2 g vaginally three times a week paraben-free   gabapentin (NEURONTIN) 800 MG tablet   Yes No   Sig: Take 800 mg by mouth 2 times daily In AM and afternoon   gabapentin (NEURONTIN) 800 MG tablet   Yes No   Sig: Take 1,600 mg by mouth At Bedtime   ibuprofen (ADVIL) 200 MG tablet   Yes No   Sig: Take 200 mg by mouth every 4 hours as needed for mild pain or pain   isosorbide dinitrate (ISORDIL) 10 MG tablet   No No   Sig: Take 1 tablet (10 mg) by mouth 3 times daily   lisinopril (PRINIVIL/ZESTRIL) 40 MG tablet   No No   Sig: Take 1 tablet (40 mg) by mouth daily   metoprolol succinate ER (TOPROL-XL) 100 MG 24 hr tablet   No No   Sig: Take 1 tablet (100 mg) by mouth daily   oxyCODONE (ROXICODONE) 5 MG tablet   No No   Sig: Take 1 tablet (5 mg) by mouth every 6 hours as needed for moderate to severe pain   sertraline (ZOLOFT) 100 MG tablet   No No   Sig: Take 1 tablet (100 mg) by mouth daily   sulfamethoxazole-trimethoprim (BACTRIM DS/SEPTRA  DS) 800-160 MG tablet   No No   Sig: Take 1 tablet by mouth 2 times daily for 10 days   vitamin C (ASCORBIC ACID) 500 MG tablet   Yes No   Sig: Take 500 mg by mouth daily      Facility-Administered Medications: None     Allergies   Allergies   Allergen Reactions     Atorvastatin Muscle Pain (Myalgia)     Macrobid [Nitrofurantoin Anhydrous] Other (See Comments)     Body aches     Sulfa Drugs        Social History   Social History     Tobacco Use     Smoking status: Never Smoker     Smokeless tobacco: Never Used   Substance Use Topics     Alcohol use: No     Social History     Social History Narrative     Not on file     Lives alone.  Denies smoking no use of alcohol    Family History   Father had coronary artery disease.  Mom  of old age    Review of Systems   A Comprehensive greater than 10 system review of systems was carried out.  Pertinent positives and negatives are noted above.  Otherwise negative for contributory information.    Physical Exam   Temp: 98.8  F (37.1  C) Temp src: Oral BP: 195/83 Pulse: 74 Heart Rate: 71 Resp: 18 SpO2: 100 % O2 Device: None (Room air)    Vital Signs with Ranges  Temp:  [98.8  F (37.1  C)] 98.8  F (37.1  C)  Pulse:  [68-74] 74  Heart Rate:  [71] 71  Resp:  [18] 18  BP: (183-195)/(76-83) 195/83  SpO2:  [97 %-100 %] 100 %  130 lbs 0 oz    GEN:  Alert, oriented x 3, appears comfortable, no overt distress  HEENT:  Normocephalic/atraumatic, no scleral icterus, no nasal discharge, mouth dry  CV:  Regular rate and rhythm,   S1 + S2 noted, no S3 or S4.  LUNGS:  Clear to auscultation bilaterally without rales/rhonchi/wheezing/retractions.  Symmetric chest rise on inhalation noted.  ABD:  Active bowel sounds, soft, no suprapubic discomfort.  No rebound/guarding/rigidity.  EXT:  No edema.  No cyanosis.  N  SKIN:  Dry to touch, no exanthems noted in the visualized areas.  NEURO:  Symmetric muscle strength, No new focal deficits appreciated.    Data   Data reviewed today:  I personally  reviewed no images or EKG's today.    Recent Labs   Lab 06/10/19  2115   PHV 7.31*   PO2V 14*   PCO2V 49   HCO3V 25     Recent Labs   Lab 06/10/19  1943   WBC 8.0   HGB 12.9   HCT 39.8   MCV 95        Recent Labs   Lab 06/10/19  1943      POTASSIUM 4.2   CHLORIDE 106   CO2 25   ANIONGAP 7   *   BUN 14   CR 0.71   GFRESTIMATED 76   GFRESTBLACK 88   DAVE 8.7     No results for input(s): CULT in the last 168 hours.  No results for input(s): AST, ALT, GGT, ALKPHOS, BILITOTAL, BILICONJ, BILIDIRECT, LOLA in the last 168 hours.    Invalid input(s): BILIRUBININDIRECT  No results for input(s): INR in the last 168 hours.  Recent Labs   Lab 06/10/19  2115   LACT 2.8*     Recent Labs   Lab 06/10/19  1954   COLOR Light Yellow   APPEARANCE Slightly Cloudy   URINEGLC Negative   URINEBILI Negative   URINEKETONE Negative   SG 1.008   UBLD Trace*   URINEPH 7.5*   PROTEIN 20*   NITRITE Negative   LEUKEST Large*   RBCU 10*   WBCU >182*       No results found for this or any previous visit (from the past 24 hour(s)).

## 2019-06-11 NOTE — ED TRIAGE NOTES
"Pt arrives to the ED due to urinary frequency and dysuria. Pt states \" I feel like I am peeing every 5 min\". Pt recently admitted for 5 days for a UTI and sent home with bactrim. Pt has been home for about 3-4 days from hospital. Pt states she felt like she had a fever today, did not take any ibuprofen or tylenol. EMS gave 4 mg ODT with relief for pt. States increased weakness today.  "

## 2019-06-11 NOTE — PHARMACY-ADMISSION MEDICATION HISTORY
Admission medication history interview status for this patient is complete. See [facility] admission navigator for allergy information, prior to admission medications and immunization status.     Medication history interview source(s):Patient  Medication history resources (including written lists, pill bottles, clinic record):None    Changes made to PTA medication list:  Added: none  Deleted: bactrim DS (completed)  Changed: none    Actions taken by pharmacist (provider contacted, etc):None     Additional medication history information:None    Medication reconciliation/reorder completed by provider prior to medication history? No    For patients on insulin therapy: no (Yes/No)   Lantus/levemir/NPH/Mix 70/30 dose: ___ in AM/PM or twice daily   Sliding scale Novolog Y/N   If Yes, do you have a baseline novolog pre-meal dose: ______units with meals   Patients eat three meals a day: Y/N ---  How many episodes of hypoglycemia (low blood glucose) do you have weekly: ---   How many missed doses do you have a week: ---  How many times do you check your blood glucose per day: ---  Any Barriers to therapy: cost of medications/comfortable with giving injections (if applicable)/ comfortable and confident with current diabetes regimen ---      Prior to Admission medications    Medication Sig Last Dose Taking? Auth Provider   acetaminophen-codeine (TYLENOL #3) 300-30 MG tablet TAKE ONE TABLET BY MOUTH EVERY 8 HOURS AS NEEDED FOR SEVERE PAIN    Hold this medication while on scheduled tylenol and as needed oxycodone  Yes Dang Stahl MD   amLODIPine (NORVASC) 10 MG tablet Take 1 tablet (10 mg) by mouth daily 6/9/2019 at Unknown time Yes Marcy Soares PA-C   aspirin EC 81 MG EC tablet Take 1 tablet (81 mg) by mouth daily 6/9/2019 at Unknown time Yes Andre Mcmanus MD   Calcium Carbonate (CALCIUM 600 PO) Take 1 tablet by mouth 2 times daily  6/9/2019 at Unknown time Yes Reported, Patient   Cholecalciferol (VITAMIN D3) 2000 UNITS  CAPS Take 2,000 Units by mouth daily (with dinner) 6/9/2019 at Unknown time Yes Unknown, Entered By History   cloNIDine (CATAPRES) 0.1 MG tablet TAKE ONE TABLET BY MOUTH TWO TIMES A DAY 6/9/2019 at Unknown time Yes Marcy Soares PA-C   estradiol (ESTRACE) 0.1 MG/GM vaginal cream Place 2 g vaginally three times a week paraben-free Past Week at fri Yes Marcy Soares PA-C   gabapentin (NEURONTIN) 800 MG tablet Take 800 mg by mouth 2 times daily In AM and afternoon 6/9/2019 at Unknown time Yes Reported, Patient   gabapentin (NEURONTIN) 800 MG tablet Take 1,600 mg by mouth At Bedtime 6/9/2019 at Unknown time Yes Reported, Patient   ibuprofen (ADVIL) 200 MG tablet Take 200 mg by mouth every 4 hours as needed for mild pain or pain  Yes Reported, Patient   isosorbide dinitrate (ISORDIL) 10 MG tablet Take 1 tablet (10 mg) by mouth 3 times daily 6/9/2019 at Unknown time Yes Marcy Soares PA-C   lisinopril (PRINIVIL/ZESTRIL) 40 MG tablet Take 1 tablet (40 mg) by mouth daily 6/9/2019 at Unknown time Yes Marcy Soares PA-C   metoprolol succinate ER (TOPROL-XL) 100 MG 24 hr tablet Take 1 tablet (100 mg) by mouth daily 6/9/2019 at Unknown time Yes Marcy Soares PA-C   Multiple Vitamins-Minerals (PRESERVISION AREDS) CAPS Take 1 capsule by mouth 2 times daily 6/9/2019 at Unknown time Yes Unknown, Entered By History   oxyCODONE (ROXICODONE) 5 MG tablet Take 1 tablet (5 mg) by mouth every 6 hours as needed for moderate to severe pain  Yes Dang Stahl MD   sertraline (ZOLOFT) 100 MG tablet Take 1 tablet (100 mg) by mouth daily 6/9/2019 at Unknown time Yes Marcy Soares PA-C   vitamin C (ASCORBIC ACID) 500 MG tablet Take 500 mg by mouth daily 6/9/2019 at Unknown time Yes Reported, Patient

## 2019-06-11 NOTE — PROGRESS NOTES
A & O x 4. This am had emesis 2.5 hours after breakfast. At 1230 patient up in bathroom when started to have bllody stool with clots, large amounts. MD notified, came to see patient order for GI to see. Dr. Degroot in to see, endoscopy ordered. Patient down for endo, returned, results negative. Started on IVP protonix. Hgb 12.3 and dropped to 9.1. Repeat Hgb later today. Resting after endo.

## 2019-06-11 NOTE — PROGRESS NOTES
[facility] [place]  Hospitalist Progress Note  Jermaine Pereira MD 06/11/19    Reason for Stay (Diagnosis): ESBL UTI         Assessment and Plan:      Summary of Stay: Daya Ignacio is a 88 year old female with past medical history HTN, HLD, vision loss, anxiety, chronic pain on Tylenol 3, and recent admission from 5/26 through 5/31 here for ESBL UTI sent home on Bactrim who is now admitted on 6/10/2019  after 24 hours of malaise, reported fevers at home, return of dysuria and urinary frequency consistent with ongoing UTI.  Urinalysis with gross pyuria.  Started on meropenem for suspected ESBL UTI.  Afebrile, vital signs stable, no leukocytosis.  Lactic acid was elevated at 2.8 that resolved with IV fluid bolus.  Infectious disease has been consulted.  Now with bloody stools, made n.p.o., consulting GI. Cdif pcr positive, start oral vancomycin 125mg qid po    Problem List/Assessment and Plan:   ESBL UTI: Hospitalized here from 5/26 through 5/31 for ESBL E. coli UTI.  Urinary symptoms had resolved on meropenem and discharged on p.o. Bactrim.  Dysuria and urinary frequency returned day prior to admission along with generalized malaise.  She thinks she had a fever at home.  Afebrile here and no leukocytosis.  Urinalysis with gross pyuria.  There was also report of some loose stools at home so consideration for C. difficile, however patient currently denying any liquid stools.  No abdominal pain.  -Continue IV meropenem, ertapenem also seems to be a reasonable option.  -Infectious disease consult  -Awaiting follow-up urine culture results, growing E. Coli, suspect will be ESBL  -If develops liquid stool send sample for C. difficile PCR  -Zofran and Compazine for nausea    Acute GI bleed: Patient had episode of darker emesis and now is having multiple episodes of dark red bloody liquid stools concerning for acute GI bleed.  I saw some of the emesis which did not look like coffee grounds a possible upper GI bleed.   She does take an 81 mg aspirin daily and uses ibuprofen approximately 3 times per week.  No history of PUD.  Vitals stable and has received her home meds.  If not upper than? Ischemic vs infectious colitis, no abdominal pain.  She is lightheaded now, however blood pressure stable with systolic 130 and not tachycardic.  -Stop 81 mg aspirin  -Made patient n.p.o. and ordered IV normal saline  -Consult GI  -Send C. difficile PCR given recent antibiotics  -start IV Protonix 40 mg twice daily, dissipate may need EGD today  -Stat hemoglobin check now and then repeat later this evening  -Vitals stable so hold off on transfer to ICU at this time  Addendum: EGD without acute pathology or evidence for hematin stomach.  Suspect lower GI bleed diverticular versus infectious colitis versus schema colitis.  Patient not interested in colonoscopy at this time.  Continue with symptomatic management, usually diverticular bleed will stop on its own within 24 to 48 hours.  Follow hemoglobin every 4 hours.  Type and screen.  Continue n.p.o. status today until sure bleeding is stopped.  If any significant change in vital signs with persistent bleeding will transfer ICU for closer monitoring.  -cdif pcr positive, start oral vancomycin 125mg qid po    Elevated lactic acid: Lactic acid elevated to 2.8 in setting of infection.  Not acidotic or hypotensive.  Improved to 1.7 with IV fluids.    HTN: Continue PTA amlodipine 10 mg daily, Imdur 10 mg daily, metoprolol  mg daily lisinopril 40 g daily, clonidine 0.1 mg twice daily with hold parameters for low blood pressure.    HLD: Not currently on any medications.    Vision loss: Chronic.  History of macular degeneration along with retinal vein occlusion on the right.    Chronic pain: History of hip fracture.  Chronic sciatica pain.  Continue PTA Tylenol 3 every 8 hours as needed along with gabapentin 800 mg twice daily and 1600 mg at bedtime.    Anxiety: Continue PTA sertraline.      DVT  "Prophylaxis: Pneumatic Compression Devices  Code Status: Full Code  FEN: regular diet as tolerated  Discharge Dispo: home.  Ambulating ok in room  Estimated Disch Date / # of Days until Disch: pending abx plan.  Vomiting today, so earlie discharge would be tomorrow if tolerating p.o. and antibiotic plan in place.st         Interval History (Subjective):      Assumed care this morning.  Patient feels tired as she has not slept in 2 nights.  Otherwise was feeling okay without any abdominal pain, nausea, vomiting.  Denies any diarrhea or liquid stools.  Did feel febrile at home.  Shortly after I saw the patient she did have nausea with emesis.                  Physical Exam:      Last Vital Signs:  /66 (BP Location: Right arm)   Pulse 66   Temp 97.9  F (36.6  C) (Oral)   Resp 18   Ht 1.549 m (5' 1\")   Wt 56.1 kg (123 lb 9.6 oz)   SpO2 98%   BMI 23.35 kg/m        Intake/Output Summary (Last 24 hours) at 6/11/2019 1132  Last data filed at 6/11/2019 0829  Gross per 24 hour   Intake 240 ml   Output --   Net 240 ml       Constitutional: Awake, NAD   Eyes: sclera white   HEENT: MMM  Respiratory:  lungs cta bilaterally, no crackles or wheeze  Cardiovascular: RRR.  No murmur   GI: non-tender, not distended, bowel sounds present  Skin: no rash  Musculoskeletal/extremities: No edema  Neurologic: A&O  Psychiatric: calm, cooperative, normal affect         Medications:      All current medications were reviewed with changes reflected in problem list.         Data:      All new lab and imaging data was reviewed.   Labs:  Recent Labs   Lab 06/10/19  2206 06/10/19  2155 06/10/19  1954   CULT No growth after 6 hours No growth after 6 hours >100,000 colonies/mL  Escherichia coli  Susceptibility testing in progress  *     Recent Labs   Lab 06/11/19  0810 06/10/19  1943    138   POTASSIUM 3.6 4.2   CHLORIDE 107 106   CO2 27 25   ANIONGAP 5 7   GLC 96 124*   BUN 15 14   CR 0.61 0.71   GFRESTIMATED 81 76   GFRESTBLACK >90 " 88   DAVE 8.4* 8.7     Recent Labs   Lab 06/11/19  0810 06/10/19  1943   WBC 6.9 8.0   HGB 12.3 12.9   HCT 38.9 39.8   MCV 96 95    213    Lactic acid 1.7    Imaging:   None today      Jermaine Pereira MD

## 2019-06-11 NOTE — ED PROVIDER NOTES
"History     Chief Complaint:  Rule out Urinary Tract Infection    HPI  Daya Ignacio is a 88 year old female with a history including recurrent urinary tract infections, chronic pain syndrome, hyperlipidemia, and hypertension who presents to the emergency department today to rule out urinary tract infection. I evaluated this patient on 05/26/2019 after a fall accompanied with diarrhea, nausea, vomiting, and myalgias at which time a urinary tract infection was identified among other findings. She was provided a course of Bactrim that she is still taking and reportedly has supply for a few more days. The patient reports waking up this morning and having five normal bowel movement. She notes that her frequent bowel movements resolved though she began urinating about every five minutes. She additionally reports feeling feverish and warm and \"so weak she was not able to get into a car.\" The patient called Tok Madill, reported her symptomatolgy, and was advised to visit the emergency department. She denies hematuria, dysuria, nausea, vomiting, chest pain, shortness of breath, hematochezia, or chills.     Allergies:  Atorvastatin  Nitrofurantoin  Penicillins   Macrobid  Sulfa Drugs   Sulfasalazine    Medications:    Bactrim  Tylenol #3  Norvasc  Aspirin   Calcium 600 po  Vitamin D3  Catapres  Estrace  Neurontin  Advil  Isordil  Prinivil/zestril  Toprol-XL  Roxicodone  Zoloft  Ascorbic acid    Past Medical History:    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  Closed fracture of unspecified part of neck of femur  Closed fracture of unspecified part of vertebral column without mention of spinal cord injury  Fall   Elevated troponin   Hypokalemia   Recurrant urinary tract infection  Diarrhea   Trochanteric fracture of left femur  Hip pain   Urinary tract infection without hematuria   Non-st elevated myocardial infarction  Chronic pain   Hypertension   Vitamin d deficiency   Hip fracture   Hyperlipidemia " "  Osteoporosis   Sciatica   Generalized anxiety disorder   Chronic pain syndrome    Past Surgical History:    Left hip pain procedure, left   Hip replacement, right     Tonsil and adenoidectomy     Cataract extraction w/ intraocular lens implant      Family History:    The patient's family history includes Alzheimer Disease in her mother; Cardiovascular in her father; Neurologic Disorder (age of onset: 83) in her brother.    Social History:  The patient reports that she has never smoked. She has never used smokeless tobacco. She reports that she does not drink alcohol or use drugs.   PCP: Marcy Soares    Review of Systems   Constitutional: Negative for chills and fever.        Positive for feeling warm and feverish.    Respiratory: Negative for shortness of breath.    Cardiovascular: Negative for chest pain.   Gastrointestinal: Negative for blood in stool, nausea and vomiting.   Genitourinary: Positive for frequency. Negative for dysuria and hematuria.   Neurological: Positive for weakness.       Physical Exam     Patient Vitals for the past 24 hrs:   BP Temp Temp src Pulse Heart Rate Resp SpO2 Height Weight   06/10/19 2130 189/73 -- -- 73 -- -- 96 % -- --   06/10/19 2100 195/83 -- -- 74 -- -- 100 % -- --   06/10/19 2000 183/77 -- -- 68 -- -- 98 % -- --   06/10/19 1917 -- 98.8  F (37.1  C) Oral -- -- -- -- -- --   06/10/19 1914 186/76 -- -- 71 71 18 97 % 1.549 m (5' 1\") 59 kg (130 lb)     Physical Exam  General: Alert, no acute distress; nontoxic appearing  HEENT:  Dry mucous membranes.  Conjunctiva normal.   CV:  RRR, no m/r/g, skin warm and well perfused  Pulm:  CTAB, no wheezes/ronchi/rales.  No acute distress, breathing comfortably  GI:  Soft, nontender, nondistended.  No rebound or guarding.  Normal bowel sounds  MSK:  Moving all extremities.  No focal areas of edema, erythema, or tenderness  Skin:  WWP, no rashes, no lower extremity edema, skin color normal, no diaphoresis  Psych:  Well-appearing, normal " affect, regular speech      Emergency Department Course     Laboratory:  Laboratory findings were communicated with the patient who voiced understanding of the findings.    UA with Microscopic: Color Light Yellow, Appearance Slightly Cloudy, Blood Trace (A), pH 7.5 (H), Protein Albumin 20 (A), Leukocyte Esterase Large (A), WBC >182 (H), RBC 10 (H), WBC Clumps Present (A), Bacteria Few (A). All other components normal or within normal limits  CBC: WBC 8.0, HGB 12.9,   BMP: Glucose 124 (H), o/w WNL (Creatinine 0.71)    Emergency Department Course:    1922 Nursing notes and vitals reviewed.    1927 I performed a physical examination of the patient as documented above.    1943 IV was inserted and blood was drawn for laboratory testing, results above.    1954 The patient provided a urine sample here in the emergency department. This was sent for laboratory testing, findings above.    2040 I rechecked the patient.     2052 I updated the patient.     I personally reviewed the results with the patinet and answered all related questions prior to admission.  I spoke with Dr. Dos Santos who will admit the patient for further monitoring and cares.    Impression & Plan      Medical Decision Making:  Daya Ignacio is a 88 year old female who presents to the emergency department today for evaluation of urgency, frequency, generalized weakness.  Of note, patient has history of ESBL E. coli and is currently on Bactrim.  She denies any fevers.  She is otherwise nontoxic-appearing.  Work-up included labs and urinalysis.  Lactate slightly elevated.  Blood culture sent.  Straight cath urinalysis concerning for continued infection.  Given history of ESBL bacteriuria, will treat with IV meropenem.  Urine culture sent.  Patient otherwise hemodynamically stable and will be admitted to medicine for continued monitoring and cares.    Diagnosis:    ICD-10-CM    1. UTI (urinary tract infection) with pyuria N39.0 Blood culture     Urine  Culture Aerobic Bacterial     Lactic acid whole blood     ISTAT gases lactate lori POCT     ISTAT gases lactate lori POCT     CANCELED: Lactic acid whole blood   2. Generalized weakness R53.1    3. History of ESBL E. coli infection Z86.19      Disposition:   admit    Scribe Disclosure:  I, Umair Elian, am serving as a scribe at 7:10 PM on 6/10/2019 to document services personally performed by Min Chun MD based on my observations and the provider's statements to me.    [facility] EMERGENCY DEPARTMENT         Min Chun MD  06/10/19 0917

## 2019-06-11 NOTE — CONSULTS
[facility] Consultation    Consultation Assessment/Plan:    1.) Hematochezia:  - several episodes of bright red rectal bleeding this afternoon, preceded by dark, non-bloody emesis and 3 gram drop in hemoglobin  - unclear source of bleeding, however must consider UGI bleeding (risk factors include advanced age, NSAIDs including ASA & Ibuprofen, SSRI use), and we will arrange for EGD today  - empiric PPI therapy has been started  - her hemodynamic stability would be a bit atypical for rapid UGI bleeding, particularly in an elderly patient, therefore a LGI bleeding source is also a consideration (such as diverticular bleeding)      Gaurav Degroot MD  [facility]    ---------------------------------------------------------------------------------------------------------------------------  Patient Name: Daya Ignacio      YOB: 1930 (Age: 88 year old)   Medical Record #: [id number]       Primary Physician: Marcy Soares   Referring Provider: Fany Dos Santos MD   Admit Date/Time: 6/10/2019  7:07 PM       I was asked to see this patient by Fany Dos Santos MD for evaluation of hematochezia.    History of Present Illness:  87 y/o woman admitted with recurrent UTI who developed an episode of non-bloody, dark emesis this AM followed by several bouts of loose, bloody stools.  She denies abdominal pain.  She does take ASA 81mg daily, Ibuprofen 2-3x/week.  She does not take antacid medications.  She denies prior history of upper endoscopy or colonoscopy exams.  No family history of colon cancer.  Currently, she remains hemodynamically stable with SBP~130s.  She is A&O in no acute distress.  Labs prior to this event demonstrated Hgb=12.3, Znvb=020, and INR=1.18.  Repeat hemoglobin after this episode down 3 grams (hgb=9.1).    Past Medical History:  Past Medical History:   Diagnosis Date     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 4/16/2014     Closed fracture of  unspecified part of neck of femur      Closed fracture of unspecified part of vertebral column without mention of spinal cord injury     from fall summer 06     Elevated blood pressure reading without diagnosis of hypertension      Hypertension      Macular degeneration (senile) of retina, unspecified      Past Surgical History:   Procedure Laterality Date     C NONSPECIFIC PROCEDURE      lt hip pain     ORTHOPEDIC SURGERY         Review of Systems: A comprehensive review of systems was negative except for items noted in HPI/Subjective.    Current Medications:  No current outpatient medications on file.       Allergies/Sensitivities:   Allergies   Allergen Reactions     Atorvastatin Muscle Pain (Myalgia)     Macrobid [Nitrofurantoin Anhydrous] Other (See Comments)     Body aches     Sulfa Drugs           Social History:   Social History     Socioeconomic History     Marital status: Single     Spouse name: Not on file     Number of children: Not on file     Years of education: Not on file     Highest education level: Not on file   Occupational History     Not on file   Social Needs     Financial resource strain: Not on file     Food insecurity:     Worry: Not on file     Inability: Not on file     Transportation needs:     Medical: Not on file     Non-medical: Not on file   Tobacco Use     Smoking status: Never Smoker     Smokeless tobacco: Never Used   Substance and Sexual Activity     Alcohol use: No     Drug use: No     Sexual activity: Never   Lifestyle     Physical activity:     Days per week: Not on file     Minutes per session: Not on file     Stress: Not on file   Relationships     Social connections:     Talks on phone: Not on file     Gets together: Not on file     Attends [employer] service: Not on file     Active member of club or organization: Not on file     Attends meetings of clubs or organizations: Not on file     Relationship status: Not on file     Intimate partner violence:     Fear of current or  "ex partner: Not on file     Emotionally abused: Not on file     Physically abused: Not on file     Forced sexual activity: Not on file   Other Topics Concern     Parent/sibling w/ CABG, MI or angioplasty before 65F 55M? Not Asked   Social History Narrative     Not on file     Family History:   Family History   Problem Relation Age of Onset     Alzheimer Disease Mother         mild     Cardiovascular Father         heart attack     Neurologic Disorder Brother 83        Parkinson's       Physical Exam:  /66 (BP Location: Right arm)   Pulse 66   Temp 97.9  F (36.6  C) (Oral)   Resp 18   Ht 1.549 m (5' 1\")   Wt 56.1 kg (123 lb 9.6 oz)   SpO2 98%   BMI 23.35 kg/m     General Appearance: Comfortable, laying in bed, no distress  Eyes: Normal  HEENT: Normal  Neck: Normal, no palpable lymph nodes  Respiratory: Normal  Cardiovascular: Normal  Gastrointestinal: Normal bowel sounds, soft abdomen, no distension  Musculoskeletal: Normal  Lymphatic: Normal  Skin: Normal  Neurologic: A&Ox3     Labs/Imaging:  Recent Labs   Lab Test 06/11/19  0810 06/10/19  1943 05/31/19  1040 05/27/19  0627 05/26/19  1531 02/19/19  1022 07/05/17  0615  06/20/17  1259  06/27/11  0625 06/26/11  0533 06/25/11  0540  06/24/11  1920  06/24/11  0440  06/23/11  1800   WBC 6.9 8.0 6.0 8.0 17.6* 7.5 6.8   < > 11.7*   < > 7.9  --  9.7  --   --    < >  --   --   --    HGB 12.3 12.9 11.1* 11.3* 13.1 12.9 9.6*   < > 10.7*   < > 9.4* 7.9* 8.5*  --   --    < > 8.8*   < >  --    MCV 96 95 96 97 93 92 93   < > 93   < > 86  --  87   < >  --   --   --   --   --     213 180 123* 199 172 344   < > 142*   < > 102* 80* 60*   < >  --   --   --   --   --    INR  --   --   --   --   --   --   --   --  1.18*  --  1.21* 1.30* 1.40*  --  1.53*  --  3.09*  --  1.66*    < > = values in this interval not displayed.     No lab results found.    Invalid input(s): FERRITIN  Recent Labs   Lab Test 06/11/19  0810 06/10/19  1943 05/31/19  1040 05/28/19  0715 " 05/27/19  0627 05/26/19  2158 05/26/19  1531 02/19/19  1022 02/05/18  1145   POTASSIUM 3.6 4.2 4.2 4.1 4.5 3.3* 3.1* 3.9 4.1   CHLORIDE 107 106 105  --  114*  --  106 108 104   BUN 15 14 17  --  29  --  47* 22 18     Recent Labs   Lab Test 06/10/19  1954 05/26/19  1703 05/26/19  1531 02/19/19  1022 02/05/18  1145 09/06/17  1525 08/11/17  1005 07/03/17  1125 06/22/17  1225 06/20/17  1259 06/19/17  1549  05/28/17  1115 01/13/17  1020  01/22/16  1002  06/25/11  0540   PROTEIN 20* 20*  --   --   --  100* Negative 10* Negative  --  Negative   < >  --   --    < >  --    < >  --    ALBUMIN  --   --  3.5 3.8 3.9  --   --   --   --  3.5  --   --  3.6 4.2  --  3.4   < >  --    BILITOTAL  --   --  0.6 0.7 1.0  --   --   --   --  1.5*  --   --  1.6* 1.4*  --  0.6   < >  --    AST  --   --  24 13 17  --   --   --   --  11  --   --  33 10  --  10   < >  --    ALT  --   --  17 15 17  --   --   --   --  19  --   --  18 14  --  14   < >  --    AMYLASE  --   --   --   --   --   --   --   --   --   --   --   --   --   --   --   --   --  361*   LIPASE  --   --  41*  --   --   --   --   --   --   --   --   --   --   --   --   --   --   --     < > = values in this interval not displayed.

## 2019-06-11 NOTE — PLAN OF CARE
VSS. On room air. C/o headache. Prn tylenol w/ codeine given with relief. On IVF. Started on meropenum for UTI. Pt alert and oriented, very pleasant. LS diminished in lower lobes, otherwise clear. Pt denies SOB but feels a little more weak then normal. Up to bathroom frequently this shift. Skin intact other than a few scabs on legs/feet. Pt comes from senior living facility. No discharge plan in place. Continue to monitor per POC.

## 2019-06-11 NOTE — PROGRESS NOTES
Pre-Endoscopy History and Physical     Daya Ignacio MRN# [id number]   YOB: 1930 Age: 88 year old     Date of Procedure: 6/10/2019  Primary care provider: Marcy Soares  Type of Endoscopy: esophagogastroduodenoscopy (upper GI endoscopy)  Reason for Procedure: hematochezia, evaluate for upper GI bleeding  Type of Anesthesia Anticipated: Moderate (conscious) sedation    HPI:    Daya is a 88 year old female who will be undergoing the above procedure.      A history and physical has been performed. The patient's medications and allergies have been reviewed. The risks and benefits of the procedure and the sedation options and risks were discussed with the patient.  All questions were answered and informed consent was obtained.      She denies a personal or family history of anesthesia complications or bleeding disorders.     Allergies   Allergen Reactions     Atorvastatin Muscle Pain (Myalgia)     Macrobid [Nitrofurantoin Anhydrous] Other (See Comments)     Body aches     Sulfa Drugs         Current Facility-Administered Medications   Medication     acetaminophen-codeine (TYLENOL #3) 300-30 MG per tablet 1-2 tablet     amLODIPine (NORVASC) tablet 10 mg     calcium carbonate 500 mg (elemental) (OSCAL;OYSTER SHELL CALCIUM) tablet 500 mg     cloNIDine (CATAPRES) tablet 0.1 mg     fentaNYL (PF) (SUBLIMAZE) injection 25-50 mcg    Followed by     fentaNYL (PF) (SUBLIMAZE) injection 25 mcg     fentaNYL (PF) (SUBLIMAZE) injection 50 mcg    Followed by     fentaNYL (PF) (SUBLIMAZE) injection 25 mcg     flumazenil (ROMAZICON) injection 0.2 mg     flumazenil (ROMAZICON) injection 0.2 mg     gabapentin (NEURONTIN) capsule 1,600 mg     gabapentin (NEURONTIN) capsule 800 mg     isosorbide dinitrate (ISORDIL) tablet 10 mg     lisinopril (PRINIVIL/ZESTRIL) tablet 40 mg     melatonin tablet 1 mg     meropenem (MERREM) 1 g vial to attach to  mL bag     metoprolol succinate ER (TOPROL-XL) 24 hr tablet 100 mg      midazolam (VERSED) injection 0.5-1 mg    Followed by     midazolam (VERSED) injection 0.5 mg     midazolam (VERSED) injection 1 mg    Followed by     midazolam (VERSED) injection 0.5 mg     naloxone (NARCAN) injection 0.1-0.4 mg     naloxone (NARCAN) injection 0.1-0.4 mg     naloxone (NARCAN) injection 0.1-0.4 mg     ondansetron (ZOFRAN-ODT) ODT tab 4 mg    Or     ondansetron (ZOFRAN) injection 4 mg     oxyCODONE (ROXICODONE) tablet 5 mg     pantoprazole (PROTONIX) 40 mg IV push injection     prochlorperazine (COMPAZINE) injection 5 mg     sertraline (ZOLOFT) tablet 100 mg     sodium chloride 0.9% infusion     vitamin D3 (CHOLECALCIFEROL) 1000 units (25 mcg) tablet 2,000 Units       Patient Active Problem List   Diagnosis     Generalized anxiety disorder     Osteoporosis     Sciatica     HYPERLIPIDEMIA LDL GOAL <130     Hip fracture (H)     Advanced directives, counseling/discussion     Vitamin D deficiency     HTN (hypertension)     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     Hypertension goal BP (blood pressure) < 140/90     Chronic pain     NSTEMI (non-ST elevated myocardial infarction) (H)     Urinary tract infection without hematuria     Hip pain     Trochanteric fracture of left femur (H)     Diarrhea     Fall     Elevated troponin     Hypokalemia     UTI (urinary tract infection)        Past Medical History:   Diagnosis Date     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 4/16/2014     Closed fracture of unspecified part of neck of femur      Closed fracture of unspecified part of vertebral column without mention of spinal cord injury     from fall summer 06     Elevated blood pressure reading without diagnosis of hypertension      Hypertension      Macular degeneration (senile) of retina, unspecified         Past Surgical History:   Procedure Laterality Date     C NONSPECIFIC PROCEDURE      lt hip pain     ORTHOPEDIC SURGERY         Social History     Tobacco Use     Smoking status: Never Smoker      "Smokeless tobacco: Never Used   Substance Use Topics     Alcohol use: No       Family History   Problem Relation Age of Onset     Alzheimer Disease Mother         mild     Cardiovascular Father         heart attack     Neurologic Disorder Brother 83        Parkinson's       REVIEW OF SYSTEMS:     5 point ROS negative except as noted above in HPI, including Gen., Resp., CV, GI &  system review.    PHYSICAL EXAM:   /66 (BP Location: Right arm)   Pulse 66   Temp 97.9  F (36.6  C) (Oral)   Resp 18   Ht 1.549 m (5' 1\")   Wt 56.1 kg (123 lb 9.6 oz)   SpO2 98%   BMI 23.35 kg/m   Estimated body mass index is 23.35 kg/m  as calculated from the following:    Height as of this encounter: 1.549 m (5' 1\").    Weight as of this encounter: 56.1 kg (123 lb 9.6 oz).   GENERAL APPEARANCE: healthy  MENTAL STATUS: alert  AIRWAY EXAM: Mallampatti Class I (visualization of the soft palate, fauces, uvula, anterior and posterior pillars)  RESP: lungs clear to auscultation - no rales, rhonchi or wheezes  CV: regular rates and rhythm    DIAGNOSTICS:      Not indicated    IMPRESSION     ASA Class 2 - Mild systemic disease    PLAN:     EGD    The above has been forwarded to the consulting provider.    Signed Electronically by: Gaurav Degroot  June 11, 2019    "

## 2019-06-11 NOTE — ED NOTES
[facility]  ED Nurse Handoff Report    Daya Ignacio is a 88 year old female   ED Chief complaint: Rule out Urinary Tract Infection  . ED Diagnosis:   Final diagnoses:   UTI (urinary tract infection) with pyuria   Generalized weakness   History of ESBL E. coli infection     Allergies:   Allergies   Allergen Reactions     Atorvastatin Muscle Pain (Myalgia)     Macrobid [Nitrofurantoin Anhydrous] Other (See Comments)     Body aches     Sulfa Drugs        Code Status: Full Code  Activity level - Baseline/Home:  Independent. Activity Level - Current:   Stand by Assist. Lift room needed: No. Bariatric: No   Needed: No   Isolation: Yes. Infection: Not Applicable  ESBL.     Vital Signs:   Vitals:    06/10/19 1917 06/10/19 2000 06/10/19 2100 06/10/19 2130   BP:  183/77 195/83 189/73   Pulse:  68 74 73   Resp:       Temp: 98.8  F (37.1  C)      TempSrc: Oral      SpO2:  98% 100% 96%   Weight:       Height:           Cardiac Rhythm:  ,      Pain level: 0-10 Pain Scale: 10  Patient confused: No. Patient Falls Risk: Yes.   Elimination Status: Pt straight cathed for UA in ED   Patient Report - Initial Complaint: rule out UTI. Focused Assessment: Pt was recently in hospital for 5 days for a UTI. Pt states she has been at home for about 3 or 4 days but today started to have urinary frequency and dysuria. Pt had been on bactrim at home when she had gotten discharged. Pt states she felt like she had a fever at home but did not check it. Pt AOx4. Pt lives at an independent senior living facility and states that she normally uses a walker or cane to get around when needed.   Tests Performed: lab work, UA. Abnormal Results:   Abnormal Labs Reviewed   BASIC METABOLIC PANEL - Abnormal; Notable for the following components:       Result Value    Glucose 124 (*)     All other components within normal limits   ROUTINE UA WITH MICROSCOPIC - Abnormal; Notable for the following components:    Blood Urine Trace (*)      pH Urine 7.5 (*)     Protein Albumin Urine 20 (*)     Leukocyte Esterase Urine Large (*)     WBC Urine >182 (*)     RBC Urine 10 (*)     WBC Clumps Present (*)     Bacteria Urine Few (*)     All other components within normal limits   ISTAT  GASES LACTATE THERESA POCT - Abnormal; Notable for the following components:    Ph Venous 7.31 (*)     PO2 Venous 14 (*)     Lactic Acid 2.8 (*)     All other components within normal limits   .   Treatments provided: n/a  Family Comments: Pt here by herself, son aware she is here  OBS brochure/video discussed/provided to patient:  N/A  ED Medications: Medications - No data to display  Drips infusing:  No  For the majority of the shift, the patient's behavior Green. Interventions performed were n/a.     Severe Sepsis OR Septic Shock Diagnosis Present: No      ED Nurse Name/Phone Number: Maisha Olsen,   10:01 PM  RECEIVING UNIT ED HANDOFF REVIEW    Above ED Nurse Handoff Report was reviewed: Yes  Reviewed by: Tanner Wharton on Abi 10, 2019 at 10:26 PM

## 2019-06-11 NOTE — ED NOTES
Bed: ED12  Expected date: 6/10/19  Expected time:   Means of arrival: Ambulance  Comments:  [id number] 89yo recurrant UTI

## 2019-06-12 LAB
ABO + RH BLD: NORMAL
ABO + RH BLD: NORMAL
ANION GAP SERPL CALCULATED.3IONS-SCNC: 4 MMOL/L (ref 3–14)
BACTERIA SPEC CULT: ABNORMAL
BLD GP AB SCN SERPL QL: NORMAL
BLD PROD TYP BPU: NORMAL
BLD PROD TYP BPU: NORMAL
BLD UNIT ID BPU: 0
BLOOD BANK CMNT PATIENT-IMP: NORMAL
BLOOD PRODUCT CODE: NORMAL
BPU ID: NORMAL
BUN SERPL-MCNC: 23 MG/DL (ref 7–30)
CALCIUM SERPL-MCNC: 8.1 MG/DL (ref 8.5–10.1)
CHLORIDE SERPL-SCNC: 111 MMOL/L (ref 94–109)
CO2 SERPL-SCNC: 25 MMOL/L (ref 20–32)
CREAT SERPL-MCNC: 0.65 MG/DL (ref 0.52–1.04)
ERYTHROCYTE [DISTWIDTH] IN BLOOD BY AUTOMATED COUNT: 14.4 % (ref 10–15)
ERYTHROCYTE [DISTWIDTH] IN BLOOD BY AUTOMATED COUNT: 14.5 % (ref 10–15)
GFR SERPL CREATININE-BSD FRML MDRD: 79 ML/MIN/{1.73_M2}
GLUCOSE SERPL-MCNC: 85 MG/DL (ref 70–99)
HCT VFR BLD AUTO: 21.1 % (ref 35–47)
HCT VFR BLD AUTO: 23.1 % (ref 35–47)
HGB BLD-MCNC: 6.8 G/DL (ref 11.7–15.7)
HGB BLD-MCNC: 7.3 G/DL (ref 11.7–15.7)
HGB BLD-MCNC: 9.1 G/DL (ref 11.7–15.7)
Lab: ABNORMAL
MCH RBC QN AUTO: 30.7 PG (ref 26.5–33)
MCH RBC QN AUTO: 31.6 PG (ref 26.5–33)
MCHC RBC AUTO-ENTMCNC: 31.6 G/DL (ref 31.5–36.5)
MCHC RBC AUTO-ENTMCNC: 32.2 G/DL (ref 31.5–36.5)
MCV RBC AUTO: 97 FL (ref 78–100)
MCV RBC AUTO: 98 FL (ref 78–100)
NUM BPU REQUESTED: 1
PLATELET # BLD AUTO: 143 10E9/L (ref 150–450)
PLATELET # BLD AUTO: 144 10E9/L (ref 150–450)
POTASSIUM SERPL-SCNC: 3.7 MMOL/L (ref 3.4–5.3)
RBC # BLD AUTO: 2.15 10E12/L (ref 3.8–5.2)
RBC # BLD AUTO: 2.38 10E12/L (ref 3.8–5.2)
SODIUM SERPL-SCNC: 140 MMOL/L (ref 133–144)
SPECIMEN EXP DATE BLD: NORMAL
SPECIMEN SOURCE: ABNORMAL
TRANSFUSION STATUS PATIENT QL: NORMAL
TRANSFUSION STATUS PATIENT QL: NORMAL
WBC # BLD AUTO: 4.9 10E9/L (ref 4–11)
WBC # BLD AUTO: 5 10E9/L (ref 4–11)

## 2019-06-12 PROCEDURE — C9113 INJ PANTOPRAZOLE SODIUM, VIA: HCPCS | Performed by: INTERNAL MEDICINE

## 2019-06-12 PROCEDURE — 36415 COLL VENOUS BLD VENIPUNCTURE: CPT | Performed by: INTERNAL MEDICINE

## 2019-06-12 PROCEDURE — 25000132 ZZH RX MED GY IP 250 OP 250 PS 637: Mod: GY | Performed by: INTERNAL MEDICINE

## 2019-06-12 PROCEDURE — A9270 NON-COVERED ITEM OR SERVICE: HCPCS | Mod: GY | Performed by: INTERNAL MEDICINE

## 2019-06-12 PROCEDURE — 25000128 H RX IP 250 OP 636: Performed by: INTERNAL MEDICINE

## 2019-06-12 PROCEDURE — 85027 COMPLETE CBC AUTOMATED: CPT | Performed by: INTERNAL MEDICINE

## 2019-06-12 PROCEDURE — 99233 SBSQ HOSP IP/OBS HIGH 50: CPT | Performed by: INTERNAL MEDICINE

## 2019-06-12 PROCEDURE — P9016 RBC LEUKOCYTES REDUCED: HCPCS | Performed by: INTERNAL MEDICINE

## 2019-06-12 PROCEDURE — 80048 BASIC METABOLIC PNL TOTAL CA: CPT | Performed by: INTERNAL MEDICINE

## 2019-06-12 PROCEDURE — 12000000 ZZH R&B MED SURG/OB

## 2019-06-12 PROCEDURE — 85018 HEMOGLOBIN: CPT | Performed by: INTERNAL MEDICINE

## 2019-06-12 PROCEDURE — 25800030 ZZH RX IP 258 OP 636: Performed by: INTERNAL MEDICINE

## 2019-06-12 RX ADMIN — MEROPENEM 1 G: 1 INJECTION, POWDER, FOR SOLUTION INTRAVENOUS at 00:44

## 2019-06-12 RX ADMIN — VANCOMYCIN HYDROCHLORIDE 125 MG: KIT at 08:45

## 2019-06-12 RX ADMIN — LISINOPRIL 40 MG: 40 TABLET ORAL at 08:35

## 2019-06-12 RX ADMIN — CALCIUM 500 MG: 500 TABLET ORAL at 08:34

## 2019-06-12 RX ADMIN — ISOSORBIDE DINITRATE 10 MG: 5 TABLET ORAL at 21:54

## 2019-06-12 RX ADMIN — GABAPENTIN 800 MG: 400 CAPSULE ORAL at 08:34

## 2019-06-12 RX ADMIN — VANCOMYCIN HYDROCHLORIDE 125 MG: KIT at 21:54

## 2019-06-12 RX ADMIN — AMLODIPINE BESYLATE 10 MG: 10 TABLET ORAL at 08:34

## 2019-06-12 RX ADMIN — CLONIDINE HYDROCHLORIDE 0.1 MG: 0.1 TABLET ORAL at 08:38

## 2019-06-12 RX ADMIN — CLONIDINE HYDROCHLORIDE 0.1 MG: 0.1 TABLET ORAL at 21:55

## 2019-06-12 RX ADMIN — SODIUM CHLORIDE: 900 INJECTION INTRAVENOUS at 00:44

## 2019-06-12 RX ADMIN — GABAPENTIN 1600 MG: 400 CAPSULE ORAL at 21:55

## 2019-06-12 RX ADMIN — SODIUM CHLORIDE: 900 INJECTION INTRAVENOUS at 11:42

## 2019-06-12 RX ADMIN — PANTOPRAZOLE SODIUM 40 MG: 40 INJECTION, POWDER, FOR SOLUTION INTRAVENOUS at 08:45

## 2019-06-12 RX ADMIN — MEROPENEM 1 G: 1 INJECTION, POWDER, FOR SOLUTION INTRAVENOUS at 18:43

## 2019-06-12 RX ADMIN — METOPROLOL SUCCINATE 100 MG: 100 TABLET, EXTENDED RELEASE ORAL at 08:35

## 2019-06-12 RX ADMIN — MELATONIN 2000 UNITS: at 18:43

## 2019-06-12 RX ADMIN — MEROPENEM 1 G: 1 INJECTION, POWDER, FOR SOLUTION INTRAVENOUS at 08:45

## 2019-06-12 RX ADMIN — VANCOMYCIN HYDROCHLORIDE 125 MG: KIT at 18:43

## 2019-06-12 RX ADMIN — ACETAMINOPHEN AND CODEINE PHOSPHATE 2 TABLET: 300; 30 TABLET ORAL at 16:29

## 2019-06-12 RX ADMIN — SERTRALINE HYDROCHLORIDE 100 MG: 100 TABLET ORAL at 08:34

## 2019-06-12 RX ADMIN — ISOSORBIDE DINITRATE 10 MG: 5 TABLET ORAL at 15:45

## 2019-06-12 RX ADMIN — VANCOMYCIN HYDROCHLORIDE 125 MG: KIT at 12:27

## 2019-06-12 RX ADMIN — ISOSORBIDE DINITRATE 10 MG: 5 TABLET ORAL at 08:34

## 2019-06-12 RX ADMIN — GABAPENTIN 800 MG: 400 CAPSULE ORAL at 15:45

## 2019-06-12 ASSESSMENT — ACTIVITIES OF DAILY LIVING (ADL)
ADLS_ACUITY_SCORE: 17
ADLS_ACUITY_SCORE: 16
ADLS_ACUITY_SCORE: 17
ADLS_ACUITY_SCORE: 17
ADLS_ACUITY_SCORE: 14
ADLS_ACUITY_SCORE: 16

## 2019-06-12 ASSESSMENT — MIFFLIN-ST. JEOR: SCORE: 929.39

## 2019-06-12 NOTE — PROGRESS NOTES
[facility]  Hospitalist Progress Note    Andre Alexander MD 06/12/2019  Text Page      Reason for Stay (Diagnosis): UTI and hematochezia         Assessment and Plan:      Summary of Stay: Summary of Stay: Daya Ignacio is a 88 year old female with past medical history HTN, HLD, vision loss, anxiety, chronic pain on Tylenol 3, and recent admission from 5/26 through 5/31 here for ESBL UTI sent home on Bactrim who was admitted on 6/10/2019  after 24 hours of malaise, reported fevers at home, return of dysuria and urinary frequency consistent with ongoing UTI.  Urinalysis with gross pyuria.  Started on meropenem for suspected ESBL UTI.  Afebrile, vital signs stable, no leukocytosis.  Lactic acid was elevated at 2.8 that resolved with IV fluid bolus.       Problem List/Assessment and Plan:   ESBL UTI:   --Hospitalized here from 5/26 through 5/31 for ESBL E. coli UTI.   --Urine culture here again on 6/10 grew out ESBL E. Coli  -Continue IV meropenem (started 6/10)  -Infectious disease consulting  -Nausea resolved.     Acute GI bleed: Patient had episode of darker emesis and now is having multiple episodes of dark red bloody liquid stools concerning for acute GI bleed.   - She does take an 81 mg aspirin daily and uses ibuprofen approximately 3 times per week.    -EGD showed no source of bleeding on 6/11  -Lower source seems likely with a differential diagnosis including diverticular bleed versus infectious colitis versus ischemic colitis  -Stop 81 mg aspirin  -Continue n.p.o. and ordered IV normal saline due to ongoing bleeding.  -Stop Protonix since there is no evidence of upper GI bleed  -Hemoglobin drifting down.  Currently 7.3 will recheck at noon and transfuse for hemoglobin less than 7.  -She declined colonoscopy.  -[facility] following.  They recommend tagged RBC scan if bleeding continues.    ADDENDUM: HGB 6.8 at noon. Transfuse 1 U PRBC.      C. difficile colitis:  -cdif pcr  "positive, started oral vancomycin 125mg qid po on 6/11.     Elevated lactic acid: Lactic acid elevated to 2.8 in setting of infection.  Not acidotic or hypotensive.  Improved to 1.7 with IV fluids.     HTN: Continue PTA amlodipine 10 mg daily, Imdur 10 mg daily, metoprolol  mg daily lisinopril 40 g daily, clonidine 0.1 mg twice daily with hold parameters for low blood pressure.     HLD: Not currently on any medications.     Vision loss: Chronic.  History of macular degeneration along with retinal vein occlusion on the right.     Chronic pain: History of hip fracture.  Chronic sciatica pain.  Continue PTA Tylenol 3 every 8 hours as needed along with gabapentin 800 mg twice daily and 1600 mg at bedtime.     Anxiety: Continue PTA sertraline.        DVT Prophylaxis: Pneumatic Compression Devices  Code Status: Full Code  Disposition Plan   Expected discharge in 3 days to prior living arrangement once bleeding resolved and hemoglobin stable.     Entered: Andre Alexander 06/12/2019, 11:49 AM     Incidental Findings/ Discharge Issues: None            Interval History (Subjective):      Feels okay.  Denies pain.  Still having some rectal bleeding occasionally when she gets out of bed not associated with bowel movements.  She feels that the bleeding is decreasing.  Denies dizziness.                  Physical Exam:      Last Vital Signs:  /65 (BP Location: Right arm)   Pulse 61   Temp 99.1  F (37.3  C) (Oral)   Resp 20   Ht 1.549 m (5' 1\")   Wt 56.2 kg (123 lb 14.4 oz)   SpO2 97%   BMI 23.41 kg/m        Vital signs reviewed  General:  Alert, calm, NAD  CV: regular rate and rhythm, no murmurs or rubs  Lungs:  Clear to ascultation bilaterally, normal respiratory effort  Abdomen:  Soft, nontender, nondistended, no masses, normal bowel sounds  Extremities:  No edema  Psychiatric:  Mood and affect within normal limits             Medications:      All current medications were reviewed with changes reflected " in problem list.         Data:      All new lab and imaging data was reviewed.   Labs:  Hemoglobin 7.3 from 8 from 9  Creatinine 0.65

## 2019-06-12 NOTE — PLAN OF CARE
VSS. No c/o pain. Pt on room air sating in mid 90s. 2 watery bloody stools this shift. Monitoring hemoglobin. Last hemoglobin of 8. This morning 7.3. Pt denies feeling dizzy or lightheaded. No emesis this shift. IVF infusing. On IV abx for UTI and cdiff. LS clear. Scabbing on lower extremities and feet, otherwise skin intact. Pt NPO this shift. GI to see today. Continue to monitor per POC.

## 2019-06-12 NOTE — PROGRESS NOTES
"GASTROENTEROLOGY PROGRESS NOTE        SUBJECTIVE:  No abdominal pain, nausea or vomiting. Passed red blood per rectum this afternoon, no stool.      OBJECTIVE:    /65 (BP Location: Right arm)   Pulse 61   Temp 99.1  F (37.3  C) (Oral)   Resp 20   Ht 1.549 m (5' 1\")   Wt 56.2 kg (123 lb 14.4 oz)   SpO2 97%   BMI 23.41 kg/m    Temp (24hrs), Av.4  F (36.9  C), Min:97.8  F (36.6  C), Max:99.1  F (37.3  C)    Patient Vitals for the past 72 hrs:   Weight   19 0425 56.2 kg (123 lb 14.4 oz)   19 1352 55.8 kg (123 lb)   19 0420 56.1 kg (123 lb 9.6 oz)   06/10/19 1914 59 kg (130 lb)       Intake/Output Summary (Last 24 hours) at 2019 0939  Last data filed at 2019 0426  Gross per 24 hour   Intake --   Output 200 ml   Net -200 ml        PHYSICAL EXAM     Constitutional: NAD  Cardiovascular: RRR, normal S1, S2, no murmur appreciated   Respiratory: good transmission, CTAB  Abdomen: soft, NTTP         Additional Comments:  ROS, FH, SH: See initial GI consult for details.     I have reviewed the patient's new clinical lab results:     Recent Labs   Lab Test 19  0635 19  2215 19  1544  19  0810 06/10/19  1943  17  1259  11  0625 11  0533   WBC 4.9  --   --   --  6.9 8.0   < > 11.7*   < > 7.9  --    HGB 7.3* 8.0* 9.0*   < > 12.3 12.9   < > 10.7*   < > 9.4* 7.9*   MCV 97  --   --   --  96 95   < > 93   < > 86  --    *  --   --   --  213 213   < > 142*   < > 102* 80*   INR  --   --   --   --   --   --   --  1.18*  --  1.21* 1.30*    < > = values in this interval not displayed.     Recent Labs   Lab Test 19  0635 19  0810 06/10/19  1943   POTASSIUM 3.7 3.6 4.2   CHLORIDE 111* 107 106   CO2 25 27 25   BUN 23 15 14   ANIONGAP 4 5 7     Recent Labs   Lab Test 06/10/19  1954 19  1703 19  1531 19  1022 18  1145 17  1525  11  0540   ALBUMIN  --   --  3.5 3.8 3.9  --    < >  --    BILITOTAL  --   --  0.6 " 0.7 1.0  --    < >  --    ALT  --   --  17 15 17  --    < >  --    AST  --   --  24 13 17  --    < >  --    PROTEIN 20* 20*  --   --   --  100*   < >  --    LIPASE  --   --  41*  --   --   --   --   --    AMYLASE  --   --   --   --   --   --   --  361*    < > = values in this interval not displayed.        ASSESSMENT/ PLAN  Daya Ignacio is an 88-year-old female with history of recurrent UTI developed nonbloody, dark emesis followed by several episodes of hematochezia.  She was found to be C. difficile positive yesterday.    1.  GI bleeding: EGD without findings of acute bleeding source.  Suspect lower GI bleed in the setting of infectious colitis -C. difficile positive.  Diverticular or ischemic colitis are other considerations.  HgB has been drifting, down to 7.3.     -- Serial HgB, transfusions as needed per medicine  -- If ongoing rectal bleeding with dropping HgB would suggest urgent tagged RBC scan.     2. Recurrent UTI: on meropenem, ID following  3. C.difficile: on oral vancomycin     Discussed with TOMAS HarrisonC  [facility] ( [facility])

## 2019-06-12 NOTE — CONSULTS
Care Coordination:  CTS following for readmission with average EMI score. Pt was admitted from 5/26-5/31 for ESBL UTI and is now again readmitted with UTI, GIB. She lives at the [facility] where she receives no services. She was discharged home last hospitalization with bactrim and [facility] RN/PT. She is currently being followed by ID and treated with IV abx. She is also being followed by GI for suspected lower GIB. She is cdiff positive and has been started on PO vanco. Per MD note, she is expected to discharge back to her [facility]F once her bleeding is resolve and hgb stable. CTS will cont to follow for discharge plan of care and pt needs.    Alberta Rojas RN CTS  [phone]

## 2019-06-12 NOTE — PLAN OF CARE
Pt A/O x 4. VSS on RA . Denied pain, dizziness, or lethargy. No tele , denied CP . LS clear but diminished, no SOB reported. Ambulated to bathroom SBA with GB, voiding well. Had 2 loose, bloody stools, dark red/maroon with a couple clots; 1st brief was saturated with blood, 2nd had smears of blood. RN was notified of critical lab, Hgb 6.8, MD notified. Pt started on one unit RBC transfusion at 1350, infusing at 150 ml/hr through PIV to right FA, no complications or reactions. Maintained contact and enteric precautions. Recheck Hgb after transfusion, gastroenterology following. Continue POC.

## 2019-06-12 NOTE — PROGRESS NOTES
[facility]  Patient is currently open to home care services with [facility].  The patient is currently receiving RN/PT services.  [facility]  and team have been notified of patient admission.  [facility] liaison will continue to follow patient during stay.  If appropriate provide orders to resume home care at time of discharge.

## 2019-06-12 NOTE — PLAN OF CARE
A/O, forgetful.  Assist x1 to transfer, unsteady gait.   Hgb 9.0. Bloody stool x3.  VSS afebrile.   NPO.

## 2019-06-12 NOTE — PROGRESS NOTES
[facility]  Infectious Disease Progress Note          Assessment and Plan:   IMPRESSION:   1.  An 88-year-old female with vague generalized symptoms, malaise, suprapubic discomfort and some urinary symptoms, grossly abnormal urine culture growing E. coli, all occurring in the context of recent ESBL UTI being appropriately treated with Septra, if truly same organism still SULFA sensitive, presumably some anatomic abnormality, no imaging to date.   2.  Recent extended-spectrum beta-lactamases Escherichia coli UTI that was SULFA sensitive being treated with Septra with initial improvement in symptoms.   3.  History of recurrent urinary tract infections, although on careful review previous Urology investigation felt most of her symptoms were not UTI related and rather related to vaginal and nonspecific issues.   4.  Hematochezia is a new GI problem, currently also having diarrhea, I suppose C. diff a consideration.   5.  MACRODANTIN, SULFA listed allergies, tolerated the SULFA when given recently.   6.  Chronic pain syndrome.  7 Diarrhea, C diff +      RECOMMENDATIONS:   1.  On meropenem, organism has evolved SULFA resistance, this is likely the issue, hold on imaging of the urologic system. Sxs improved  2.  Surprise + C. diff , vanco po 2 weeks, makes desirable to limit ABX , ? 1 week            Interval History:   no new complaints less bleeding transfusion now, C diff +, UC now sulfa R              Medications:       amLODIPine  10 mg Oral Daily     calcium carbonate 500 mg (elemental)  500 mg Oral BID     cloNIDine  0.1 mg Oral BID     gabapentin  1,600 mg Oral At Bedtime     gabapentin  800 mg Oral 2 times per day     isosorbide dinitrate  10 mg Oral TID     lisinopril  40 mg Oral Daily     meropenem  1 g Intravenous Q8H     metoprolol succinate ER  100 mg Oral Daily     sertraline  100 mg Oral Daily     vancomycin  125 mg Oral 4x Daily     vitamin D3  2,000 Units Oral Daily with supper  "                 Physical Exam:   Blood pressure 122/44, pulse 71, temperature 98.9  F (37.2  C), temperature source Oral, resp. rate 20, height 1.549 m (5' 1\"), weight 56.2 kg (123 lb 14.4 oz), SpO2 98 %, not currently breastfeeding.  Wt Readings from Last 2 Encounters:   06/12/19 56.2 kg (123 lb 14.4 oz)   05/26/19 58.2 kg (128 lb 4.8 oz)     Vital Signs with Ranges  Temp:  [97.8  F (36.6  C)-99.1  F (37.3  C)] 98.9  F (37.2  C)  Pulse:  [71] 71  Heart Rate:  [66-77] 68  Resp:  [18-24] 20  BP: (118-160)/(43-65) 122/44  SpO2:  [95 %-100 %] 98 %    Constitutional: Awake, alert, cooperative, no apparent distress   Lungs: Clear to auscultation bilaterally, no crackles or wheezing   Cardiovascular: Regular rate and rhythm, normal S1 and S2, and no murmur noted   Abdomen: Normal bowel sounds, soft, non-distended, non-tender   Skin: No rashes, no cyanosis, no edema   Other:           Data:   All microbiology laboratory data reviewed.  Recent Labs   Lab Test 06/12/19  1152 06/12/19  0635 06/11/19  2215  06/11/19  0810   WBC 5.0 4.9  --   --  6.9   HGB 6.8* 7.3* 8.0*   < > 12.3   HCT 21.1* 23.1*  --   --  38.9   MCV 98 97  --   --  96   * 143*  --   --  213    < > = values in this interval not displayed.     Recent Labs   Lab Test 06/12/19  0635 06/11/19  0810 06/10/19  1943   CR 0.65 0.61 0.71     No lab results found.  Recent Labs   Lab Test 06/10/19  2206 06/10/19  2155 06/10/19  1954 05/26/19  1820 05/26/19  1813 05/26/19  1703 09/06/17  1525 08/11/17  1005 06/28/17  1830   CULT No growth after 1 day No growth after 1 day >100,000 colonies/mL  Escherichia coli ESBL  *  Enterobacteriaceae that are susceptible to meropenem are usually susceptible to ertapenem.  ESBL (extended beta lactamase) producing organisms require contact precautions. No growth No growth 50,000 to 100,000 colonies/mL  Escherichia coli ESBL  *  Critical Value/Significant Value called to and read back by  VEE DUNLAP RN ([email or web address]).  05.28.19  " 2112 GJS    Enterobacteriaceae that are susceptible to meropenem are usually susceptible to ertapenem.  ESBL (extended beta lactamase) producing organisms require contact precautions. >100,000 colonies/mL  mixed urogenital jorge   >100,000 colonies/mL Enterococcus faecalis  <10,000 colonies/mL mixed urogenital jorge Susceptibility testing not routinely   done  * 10,000 to 50,000 colonies/mL urogenital jorge  Susceptibility testing not routinely done

## 2019-06-13 LAB
ANION GAP SERPL CALCULATED.3IONS-SCNC: 5 MMOL/L (ref 3–14)
BASOPHILS # BLD AUTO: 0 10E9/L (ref 0–0.2)
BASOPHILS NFR BLD AUTO: 0.8 %
BUN SERPL-MCNC: 11 MG/DL (ref 7–30)
CALCIUM SERPL-MCNC: 8.3 MG/DL (ref 8.5–10.1)
CHLORIDE SERPL-SCNC: 109 MMOL/L (ref 94–109)
CO2 SERPL-SCNC: 27 MMOL/L (ref 20–32)
CREAT SERPL-MCNC: 0.5 MG/DL (ref 0.52–1.04)
DIFFERENTIAL METHOD BLD: ABNORMAL
EOSINOPHIL # BLD AUTO: 0.2 10E9/L (ref 0–0.7)
EOSINOPHIL NFR BLD AUTO: 4 %
ERYTHROCYTE [DISTWIDTH] IN BLOOD BY AUTOMATED COUNT: 15.5 % (ref 10–15)
ERYTHROCYTE [DISTWIDTH] IN BLOOD BY AUTOMATED COUNT: 15.7 % (ref 10–15)
GFR SERPL CREATININE-BSD FRML MDRD: 86 ML/MIN/{1.73_M2}
GLUCOSE SERPL-MCNC: 78 MG/DL (ref 70–99)
HCT VFR BLD AUTO: 26.4 % (ref 35–47)
HCT VFR BLD AUTO: 29.6 % (ref 35–47)
HGB BLD-MCNC: 8.6 G/DL (ref 11.7–15.7)
HGB BLD-MCNC: 9.5 G/DL (ref 11.7–15.7)
IMM GRANULOCYTES # BLD: 0.1 10E9/L (ref 0–0.4)
IMM GRANULOCYTES NFR BLD: 2.8 %
LYMPHOCYTES # BLD AUTO: 1.2 10E9/L (ref 0.8–5.3)
LYMPHOCYTES NFR BLD AUTO: 25.4 %
MCH RBC QN AUTO: 29.7 PG (ref 26.5–33)
MCH RBC QN AUTO: 30.3 PG (ref 26.5–33)
MCHC RBC AUTO-ENTMCNC: 32.1 G/DL (ref 31.5–36.5)
MCHC RBC AUTO-ENTMCNC: 32.6 G/DL (ref 31.5–36.5)
MCV RBC AUTO: 93 FL (ref 78–100)
MCV RBC AUTO: 93 FL (ref 78–100)
MONOCYTES # BLD AUTO: 0.4 10E9/L (ref 0–1.3)
MONOCYTES NFR BLD AUTO: 7.4 %
NEUTROPHILS # BLD AUTO: 2.8 10E9/L (ref 1.6–8.3)
NEUTROPHILS NFR BLD AUTO: 59.6 %
NRBC # BLD AUTO: 0 10*3/UL
NRBC BLD AUTO-RTO: 0 /100
PLATELET # BLD AUTO: 139 10E9/L (ref 150–450)
PLATELET # BLD AUTO: 141 10E9/L (ref 150–450)
POTASSIUM SERPL-SCNC: 3.3 MMOL/L (ref 3.4–5.3)
RBC # BLD AUTO: 2.84 10E12/L (ref 3.8–5.2)
RBC # BLD AUTO: 3.2 10E12/L (ref 3.8–5.2)
SODIUM SERPL-SCNC: 141 MMOL/L (ref 133–144)
WBC # BLD AUTO: 4.7 10E9/L (ref 4–11)
WBC # BLD AUTO: 6.1 10E9/L (ref 4–11)

## 2019-06-13 PROCEDURE — A9270 NON-COVERED ITEM OR SERVICE: HCPCS | Mod: GY | Performed by: INTERNAL MEDICINE

## 2019-06-13 PROCEDURE — 25000132 ZZH RX MED GY IP 250 OP 250 PS 637: Mod: GY | Performed by: INTERNAL MEDICINE

## 2019-06-13 PROCEDURE — 25000128 H RX IP 250 OP 636: Performed by: INTERNAL MEDICINE

## 2019-06-13 PROCEDURE — 85025 COMPLETE CBC W/AUTO DIFF WBC: CPT | Performed by: INTERNAL MEDICINE

## 2019-06-13 PROCEDURE — 36415 COLL VENOUS BLD VENIPUNCTURE: CPT | Performed by: INTERNAL MEDICINE

## 2019-06-13 PROCEDURE — 80048 BASIC METABOLIC PNL TOTAL CA: CPT | Performed by: INTERNAL MEDICINE

## 2019-06-13 PROCEDURE — 12000000 ZZH R&B MED SURG/OB

## 2019-06-13 PROCEDURE — 85027 COMPLETE CBC AUTOMATED: CPT | Performed by: INTERNAL MEDICINE

## 2019-06-13 PROCEDURE — 25800030 ZZH RX IP 258 OP 636: Performed by: INTERNAL MEDICINE

## 2019-06-13 PROCEDURE — 99233 SBSQ HOSP IP/OBS HIGH 50: CPT | Performed by: INTERNAL MEDICINE

## 2019-06-13 RX ADMIN — SERTRALINE HYDROCHLORIDE 100 MG: 100 TABLET ORAL at 08:32

## 2019-06-13 RX ADMIN — VANCOMYCIN HYDROCHLORIDE 125 MG: KIT at 17:11

## 2019-06-13 RX ADMIN — ACETAMINOPHEN AND CODEINE PHOSPHATE 2 TABLET: 300; 30 TABLET ORAL at 17:16

## 2019-06-13 RX ADMIN — SODIUM CHLORIDE: 900 INJECTION INTRAVENOUS at 05:33

## 2019-06-13 RX ADMIN — SODIUM CHLORIDE: 900 INJECTION INTRAVENOUS at 16:14

## 2019-06-13 RX ADMIN — CALCIUM 500 MG: 500 TABLET ORAL at 08:32

## 2019-06-13 RX ADMIN — MEROPENEM 1 G: 1 INJECTION, POWDER, FOR SOLUTION INTRAVENOUS at 08:32

## 2019-06-13 RX ADMIN — AMLODIPINE BESYLATE 10 MG: 10 TABLET ORAL at 08:31

## 2019-06-13 RX ADMIN — VANCOMYCIN HYDROCHLORIDE 125 MG: KIT at 12:59

## 2019-06-13 RX ADMIN — MELATONIN 2000 UNITS: at 17:04

## 2019-06-13 RX ADMIN — VANCOMYCIN HYDROCHLORIDE 125 MG: KIT at 23:01

## 2019-06-13 RX ADMIN — ISOSORBIDE DINITRATE 10 MG: 5 TABLET ORAL at 23:01

## 2019-06-13 RX ADMIN — SODIUM CHLORIDE: 900 INJECTION INTRAVENOUS at 20:29

## 2019-06-13 RX ADMIN — MEROPENEM 1 G: 1 INJECTION, POWDER, FOR SOLUTION INTRAVENOUS at 00:33

## 2019-06-13 RX ADMIN — GABAPENTIN 800 MG: 400 CAPSULE ORAL at 17:04

## 2019-06-13 RX ADMIN — CLONIDINE HYDROCHLORIDE 0.1 MG: 0.1 TABLET ORAL at 08:31

## 2019-06-13 RX ADMIN — GABAPENTIN 800 MG: 400 CAPSULE ORAL at 08:31

## 2019-06-13 RX ADMIN — CLONIDINE HYDROCHLORIDE 0.1 MG: 0.1 TABLET ORAL at 20:30

## 2019-06-13 RX ADMIN — VANCOMYCIN HYDROCHLORIDE 125 MG: KIT at 08:54

## 2019-06-13 RX ADMIN — GABAPENTIN 1600 MG: 400 CAPSULE ORAL at 23:02

## 2019-06-13 RX ADMIN — OXYCODONE HYDROCHLORIDE 5 MG: 5 TABLET ORAL at 20:30

## 2019-06-13 RX ADMIN — METOPROLOL SUCCINATE 100 MG: 100 TABLET, EXTENDED RELEASE ORAL at 08:31

## 2019-06-13 RX ADMIN — MEROPENEM 1 G: 1 INJECTION, POWDER, FOR SOLUTION INTRAVENOUS at 17:05

## 2019-06-13 RX ADMIN — ISOSORBIDE DINITRATE 10 MG: 5 TABLET ORAL at 17:05

## 2019-06-13 RX ADMIN — CALCIUM 500 MG: 500 TABLET ORAL at 20:30

## 2019-06-13 RX ADMIN — ISOSORBIDE DINITRATE 10 MG: 5 TABLET ORAL at 08:31

## 2019-06-13 RX ADMIN — LISINOPRIL 40 MG: 40 TABLET ORAL at 08:32

## 2019-06-13 ASSESSMENT — ACTIVITIES OF DAILY LIVING (ADL)
ADLS_ACUITY_SCORE: 15
ADLS_ACUITY_SCORE: 14
ADLS_ACUITY_SCORE: 15
ADLS_ACUITY_SCORE: 16
ADLS_ACUITY_SCORE: 14
ADLS_ACUITY_SCORE: 14

## 2019-06-13 ASSESSMENT — MIFFLIN-ST. JEOR: SCORE: 938.46

## 2019-06-13 NOTE — PROGRESS NOTES
[facility]  Infectious Disease Progress Note          Assessment and Plan:   IMPRESSION:   1.  An 88-year-old female with vague generalized symptoms, malaise, suprapubic discomfort and some urinary symptoms, grossly abnormal urine culture growing E. coli, all occurring in the context of recent ESBL UTI being appropriately treated with Septra, if truly same organism still SULFA sensitive, presumably some anatomic abnormality, no imaging to date.   2.  Recent extended-spectrum beta-lactamases Escherichia coli UTI that was SULFA sensitive being treated with Septra with initial improvement in symptoms.   3.  History of recurrent urinary tract infections, although on careful review previous Urology investigation felt most of her symptoms were not UTI related and rather related to vaginal and nonspecific issues.   4.  Hematochezia is a new GI problem, currently also having diarrhea, I suppose C. diff a consideration.   5.  MACRODANTIN, SULFA listed allergies, tolerated the SULFA when given recently.   6.  Chronic pain syndrome.  7 Diarrhea, C diff +      RECOMMENDATIONS:   1.  On meropenem, organism has evolved SULFA resistance, this is likely the issue, hold on imaging of the urologic system. Sxs improved, maybe 1-2 days more  2.  Surprise + C. diff , vanco po 2 weeks, makes desirable to limit ABX , ? 1 week  3 GI bleeding controlled            Interval History:   no new complaints less bleeding transfusion now, C diff +, UC now sulfa R              Medications:       amLODIPine  10 mg Oral Daily     calcium carbonate 500 mg (elemental)  500 mg Oral BID     cloNIDine  0.1 mg Oral BID     gabapentin  1,600 mg Oral At Bedtime     gabapentin  800 mg Oral 2 times per day     isosorbide dinitrate  10 mg Oral TID     lisinopril  40 mg Oral Daily     meropenem  1 g Intravenous Q8H     metoprolol succinate ER  100 mg Oral Daily     sertraline  100 mg Oral Daily     vancomycin  125 mg Oral 4x Daily      "vitamin D3  2,000 Units Oral Daily with supper                  Physical Exam:   Blood pressure 162/59, pulse 71, temperature 98  F (36.7  C), temperature source Oral, resp. rate 20, height 1.549 m (5' 1\"), weight 57.1 kg (125 lb 14.4 oz), SpO2 99 %, not currently breastfeeding.  Wt Readings from Last 2 Encounters:   06/13/19 57.1 kg (125 lb 14.4 oz)   05/26/19 58.2 kg (128 lb 4.8 oz)     Vital Signs with Ranges  Temp:  [96.2  F (35.7  C)-98  F (36.7  C)] 98  F (36.7  C)  Heart Rate:  [65-76] 65  Resp:  [20] 20  BP: (138-175)/(51-67) 162/59  SpO2:  [95 %-99 %] 99 %    Constitutional: Awake, alert, cooperative, no apparent distress   Lungs: Clear to auscultation bilaterally, no crackles or wheezing   Cardiovascular: Regular rate and rhythm, normal S1 and S2, and no murmur noted   Abdomen: Normal bowel sounds, soft, non-distended, non-tender   Skin: No rashes, no cyanosis, no edema   Other:           Data:   All microbiology laboratory data reviewed.  Recent Labs   Lab Test 06/13/19  1221 06/13/19  0642 06/12/19  2210 06/12/19  1152   WBC 6.1 4.7  --  5.0   HGB 8.6* 9.5* 9.1* 6.8*   HCT 26.4* 29.6*  --  21.1*   MCV 93 93  --  98   * 141*  --  144*     Recent Labs   Lab Test 06/13/19  0642 06/12/19  0635 06/11/19  0810   CR 0.50* 0.65 0.61     No lab results found.  Recent Labs   Lab Test 06/10/19  2206 06/10/19  2155 06/10/19  1954 05/26/19  1820 05/26/19  1813 05/26/19  1703 09/06/17  1525 08/11/17  1005 06/28/17  1830   CULT No growth after 2 days No growth after 2 days >100,000 colonies/mL  Escherichia coli ESBL  *  Enterobacteriaceae that are susceptible to meropenem are usually susceptible to ertapenem.  ESBL (extended beta lactamase) producing organisms require contact precautions. No growth No growth 50,000 to 100,000 colonies/mL  Escherichia coli ESBL  *  Critical Value/Significant Value called to and read back by  VEE DUNLAP RN ([email or web address]).  05.28.19 2112 GJS    Enterobacteriaceae that are " susceptible to meropenem are usually susceptible to ertapenem.  ESBL (extended beta lactamase) producing organisms require contact precautions. >100,000 colonies/mL  mixed urogenital jorge   >100,000 colonies/mL Enterococcus faecalis  <10,000 colonies/mL mixed urogenital jorge Susceptibility testing not routinely   done  * 10,000 to 50,000 colonies/mL urogenital jorge  Susceptibility testing not routinely done

## 2019-06-13 NOTE — PROGRESS NOTES
[facility]  Hospitalist Progress Note    Andre Alexander MD 06/13/2019  Text Page      Reason for Stay (Diagnosis): UTI and hematochezia         Assessment and Plan:      Summary of Stay: Summary of Stay: Daya Ignacio is a 88 year old female with past medical history HTN, HLD, vision loss, anxiety, chronic pain on Tylenol 3, and recent admission from 5/26 through 5/31 here for ESBL UTI sent home on Bactrim who was admitted on 6/10/2019  after 24 hours of malaise, reported fevers at home, return of dysuria and urinary frequency consistent with ongoing UTI.  Urinalysis with gross pyuria.  Started on meropenem for suspected ESBL UTI.  Afebrile, vital signs stable, no leukocytosis.  Lactic acid was elevated at 2.8 that resolved with IV fluid bolus.       Problem List/Assessment and Plan:   ESBL UTI:   --Hospitalized here from 5/26 through 5/31 for ESBL E. coli UTI.   --Urine culture here again on 6/10 grew out ESBL E. Coli  -Continue IV meropenem (started 6/10)  -Infectious disease consulting  -Nausea resolved.     Acute GI bleed:  Initially patient had episode of darker emesis and  has since had  multiple episodes of dark red bloody liquid stools concerning for acute GI bleed.   -Frequency and amount of bloody stool decreased and none since last night.  -Transfuse 1 unit of packed red blood cells yesterday for hemoglobin of 6.7.  Hemoglobin remained stable overnight at 9.5.  - She does take an 81 mg aspirin daily and uses ibuprofen approximately 3 times per week.  Both were discontinued.   -EGD showed no source of bleeding on 6/11  -Lower source seems likely with a differential diagnosis including diverticular bleed versus infectious colitis versus ischemic colitis  -On a clear liquid diet for now with no red material.  -Stopped Protonix since there is no evidence of upper GI bleed  -Initially she declined colonoscopy.  Per gastroenterology there is no role for colonoscopy at this  "point.  -[facility] following.  They recommend tagged RBC scan if bleeding continues.  -Plan is to continue to monitor for bleeding, monitor hemoglobin, transfuse if hemoglobin less than 7 and obtain tagged RBC scan if has recurrent clinical bleeding.      C. difficile colitis:  -cdif pcr positive, started oral vancomycin 125mg qid po on 6/11.     Elevated lactic acid: Lactic acid elevated to 2.8 in setting of infection.  Not acidotic or hypotensive.  Improved to 1.7 with IV fluids.     HTN:  -BP a bit elevated this morning.  Improved after medication administration.  - Continue PTA amlodipine 10 mg daily, Imdur 10 mg daily, metoprolol  mg daily lisinopril 40 g daily, clonidine 0.1 mg twice daily with hold parameters for low blood pressure.     HLD: Not currently on any medications.     Vision loss: Chronic.  History of macular degeneration along with retinal vein occlusion on the right.     Chronic pain: History of hip fracture.  Chronic sciatica pain.  Continue PTA Tylenol 3 every 8 hours as needed along with gabapentin 800 mg twice daily and 1600 mg at bedtime.     Anxiety: Continue PTA sertraline.        DVT Prophylaxis: Pneumatic Compression Devices  Code Status: Full Code  Disposition Plan   Expected discharge in 2 days to prior living arrangement once bleeding resolved and hemoglobin stable.     Entered: Andre Alexander 06/13/2019, 11:59 AM     Incidental Findings/ Discharge Issues: None            Interval History (Subjective):      Feels well.  She had bloody stool last night but none since.  She denies any abdominal pain.  She is hungry.                  Physical Exam:      Last Vital Signs:  /51 (BP Location: Right arm)   Pulse 71   Temp 96.2  F (35.7  C) (Oral)   Resp 20   Ht 1.549 m (5' 1\")   Wt 57.1 kg (125 lb 14.4 oz)   SpO2 98%   BMI 23.79 kg/m        Vital signs reviewed  General:  Alert, calm, NAD  CV: regular rate and rhythm, no murmurs or rubs  Lungs:  " Clear to ascultation bilaterally, normal respiratory effort  Abdomen:  Soft, nontender, nondistended, no masses, normal bowel sounds  Extremities:  No edema  Psychiatric:  Mood and affect within normal limits             Medications:      All current medications were reviewed with changes reflected in problem list.         Data:      All new lab and imaging data was reviewed.   Labs:  Hemoglobin 9.5 after transfusion from 6.7 from 7.3 from 8 from 9  Creatinine 0.5

## 2019-06-13 NOTE — PLAN OF CARE
VSS, afebrile and RA sats mid 90's.  Pt denies pain.  Cont on IV abx for UTI & Vanco for Cdiff.  Pt ate clear liquids for dinner, approx a half hour later  pt started having multiple loose, black/maroon and bloody stools.   Inc of some of these stools.  MD updated,Hgb recheck was 9.1, will recheck in am, transfuse if less than 7. POC reviewed with pt,  questions  answered.  Due to increase in stools, this writer enc  pt to do Colonoscopy and pt starting to agree.

## 2019-06-13 NOTE — PLAN OF CARE
Pt A/O x 4. VSS on RA with elevated BP at 170/67, scheduled PO HTN meds administered with improvement. Denied pain. Most recent Hgb 8.6. No tele, denied CP . LS clear but diminished, no SOB reported. PIV to right FA intact, infusing with NS at 100 ml/hr. Up to bathroom SBA, voiding well, had one small BM , LPN reported absence of blood. No reported abdominal pain or discomfort, tolerated broth, jello, coffee, and water for breakfast and lunch. Maintained enteric and contact isolation precautions. Continue PO and IV abxs. Discharge plan pending, GI following. Continue POC.

## 2019-06-13 NOTE — PLAN OF CARE
Vital signs:  Temp: 96.7  F (35.9  C) Temp src: Oral BP: 138/51 Pulse: 71 Heart Rate: 66 Resp: 20 SpO2: 95 %    Pt A/O x 4. VSS on RA . Denied pain,  No tele. LS diminished, no SOB reported. Ambulating to bathroom  with SBA and GB, voiding well.  No bowel movement this shift. NS  infusing at 100 ml/ hr. On clear liquid diet.  Maintained contact and enteric precautions.  GI and infectious disease following. Will continue to monitor

## 2019-06-13 NOTE — PROGRESS NOTES
Cross cover:    Patient admitted here with ESBL UTI and acute GI bleed.  Status post 1 unit red cells, has continued to have some ongoing bloody stools.  She declined colonoscopy previously per chart.  --Check stat hemoglobin  --Transfuse for hemoglobin less than 7  --Question related to C. difficile colitis.  Will treat supportively overnight with transfusion if needed and defer revisiting colonoscopy versus tagged red cell scan to primary rounding provider in the morning.    Mervin Rodriguez MD

## 2019-06-13 NOTE — PROGRESS NOTES
"GASTROENTEROLOGY PROGRESS NOTE        SUBJECTIVE:  A few loose bloody/ maroon stools last evening. No abdominal pain, nausea, vomiting, or fever.       OBJECTIVE:    /51 (BP Location: Right arm)   Pulse 71   Temp 96.2  F (35.7  C) (Oral)   Resp 20   Ht 1.549 m (5' 1\")   Wt 57.1 kg (125 lb 14.4 oz)   SpO2 98%   BMI 23.79 kg/m    Temp (24hrs), Av.4  F (36.9  C), Min:97.8  F (36.6  C), Max:99.1  F (37.3  C)    Patient Vitals for the past 72 hrs:   Weight   19 0254 57.1 kg (125 lb 14.4 oz)   19 0425 56.2 kg (123 lb 14.4 oz)   19 1352 55.8 kg (123 lb)   19 0420 56.1 kg (123 lb 9.6 oz)   06/10/19 1914 59 kg (130 lb)       Intake/Output Summary (Last 24 hours) at 2019 0939  Last data filed at 2019 0426  Gross per 24 hour   Intake --   Output 200 ml   Net -200 ml        PHYSICAL EXAM     Constitutional: NAD  Cardiovascular: RRR, normal S1, S2, no murmur appreciated   Respiratory: good transmission, CTAB  Abdomen: soft, NTTP         Additional Comments:  ROS, FH, SH: See initial GI consult for details.     I have reviewed the patient's new clinical lab results:     Recent Labs   Lab Test 19  0642 19  2210 19  1152 19  0635  17  1259  11  0625 11  0533   WBC 4.7  --  5.0 4.9   < > 11.7*   < > 7.9  --    HGB 9.5* 9.1* 6.8* 7.3*   < > 10.7*   < > 9.4* 7.9*   MCV 93  --  98 97   < > 93   < > 86  --    *  --  144* 143*   < > 142*   < > 102* 80*   INR  --   --   --   --   --  1.18*  --  1.21* 1.30*    < > = values in this interval not displayed.     Recent Labs   Lab Test 19  0642 19  0635 19  0810   POTASSIUM 3.3* 3.7 3.6   CHLORIDE 109 111* 107   CO2 27 25 27   BUN 11 23 15   ANIONGAP 5 4 5     Recent Labs   Lab Test 06/10/19  1954 19  1703 19  1531 19  1022 18  1145 17  1525  11  0540   ALBUMIN  --   --  3.5 3.8 3.9  --    < >  --    BILITOTAL  --   --  0.6 0.7 1.0  --    < >  " --    ALT  --   --  17 15 17  --    < >  --    AST  --   --  24 13 17  --    < >  --    PROTEIN 20* 20*  --   --   --  100*   < >  --    LIPASE  --   --  41*  --   --   --   --   --    AMYLASE  --   --   --   --   --   --   --  361*    < > = values in this interval not displayed.        ASSESSMENT/ PLAN  Daya Ignacio is an 88-year-old female with history of recurrent UTI developed nonbloody, dark emesis followed by several episodes of hematochezia.  She was found to be C. difficile positive yesterday.    1.  GI bleeding: EGD without findings of acute bleeding source.  Suspect lower GI bleed in the setting of infectious colitis - C. difficile positive.  Diverticular or ischemic colitis are other considerations.  HgB has been drifting down. HgB 6.8 yesterday with PRBC transfusion and good response, 9.5 today.     -- Serial HgB, transfusions as needed per medicine  -- If ongoing rectal bleeding with dropping HgB would suggest urgent tagged RBC scan. No plan for colonoscopy at this point.    2. Recurrent UTI: on meropenem, ID following  3. C.difficile: on oral vancomycin     Discussed with Dr. Zane Meyer PA-C  [facility] ( [facility])

## 2019-06-14 LAB
HGB BLD-MCNC: 9.2 G/DL (ref 11.7–15.7)
POTASSIUM SERPL-SCNC: 3.2 MMOL/L (ref 3.4–5.3)
POTASSIUM SERPL-SCNC: 3.7 MMOL/L (ref 3.4–5.3)

## 2019-06-14 PROCEDURE — A9270 NON-COVERED ITEM OR SERVICE: HCPCS | Mod: GY | Performed by: INTERNAL MEDICINE

## 2019-06-14 PROCEDURE — 84132 ASSAY OF SERUM POTASSIUM: CPT | Performed by: INTERNAL MEDICINE

## 2019-06-14 PROCEDURE — 36415 COLL VENOUS BLD VENIPUNCTURE: CPT | Performed by: INTERNAL MEDICINE

## 2019-06-14 PROCEDURE — 12000000 ZZH R&B MED SURG/OB

## 2019-06-14 PROCEDURE — 85018 HEMOGLOBIN: CPT | Performed by: INTERNAL MEDICINE

## 2019-06-14 PROCEDURE — 25000132 ZZH RX MED GY IP 250 OP 250 PS 637: Mod: GY | Performed by: INTERNAL MEDICINE

## 2019-06-14 PROCEDURE — 25000128 H RX IP 250 OP 636: Performed by: INTERNAL MEDICINE

## 2019-06-14 PROCEDURE — 25800030 ZZH RX IP 258 OP 636: Performed by: INTERNAL MEDICINE

## 2019-06-14 PROCEDURE — 25000131 ZZH RX MED GY IP 250 OP 636 PS 637: Mod: GY | Performed by: INTERNAL MEDICINE

## 2019-06-14 PROCEDURE — 99232 SBSQ HOSP IP/OBS MODERATE 35: CPT | Performed by: INTERNAL MEDICINE

## 2019-06-14 RX ORDER — POTASSIUM CHLORIDE 1500 MG/1
20-40 TABLET, EXTENDED RELEASE ORAL
Status: DISCONTINUED | OUTPATIENT
Start: 2019-06-14 | End: 2019-06-16 | Stop reason: HOSPADM

## 2019-06-14 RX ORDER — POTASSIUM CL/LIDO/0.9 % NACL 10MEQ/0.1L
10 INTRAVENOUS SOLUTION, PIGGYBACK (ML) INTRAVENOUS
Status: DISCONTINUED | OUTPATIENT
Start: 2019-06-14 | End: 2019-06-16 | Stop reason: HOSPADM

## 2019-06-14 RX ORDER — POTASSIUM CHLORIDE 7.45 MG/ML
10 INJECTION INTRAVENOUS
Status: DISCONTINUED | OUTPATIENT
Start: 2019-06-14 | End: 2019-06-16 | Stop reason: HOSPADM

## 2019-06-14 RX ORDER — POTASSIUM CHLORIDE 29.8 MG/ML
20 INJECTION INTRAVENOUS
Status: DISCONTINUED | OUTPATIENT
Start: 2019-06-14 | End: 2019-06-14 | Stop reason: CLARIF

## 2019-06-14 RX ORDER — POTASSIUM CHLORIDE 1.5 G/1.58G
20-40 POWDER, FOR SOLUTION ORAL
Status: DISCONTINUED | OUTPATIENT
Start: 2019-06-14 | End: 2019-06-16 | Stop reason: HOSPADM

## 2019-06-14 RX ADMIN — CLONIDINE HYDROCHLORIDE 0.1 MG: 0.1 TABLET ORAL at 08:00

## 2019-06-14 RX ADMIN — GABAPENTIN 800 MG: 400 CAPSULE ORAL at 08:00

## 2019-06-14 RX ADMIN — ACETAMINOPHEN AND CODEINE PHOSPHATE 2 TABLET: 300; 30 TABLET ORAL at 19:37

## 2019-06-14 RX ADMIN — SODIUM CHLORIDE: 900 INJECTION INTRAVENOUS at 12:50

## 2019-06-14 RX ADMIN — VANCOMYCIN HYDROCHLORIDE 125 MG: KIT at 12:01

## 2019-06-14 RX ADMIN — MEROPENEM 1 G: 1 INJECTION, POWDER, FOR SOLUTION INTRAVENOUS at 00:51

## 2019-06-14 RX ADMIN — VANCOMYCIN HYDROCHLORIDE 125 MG: KIT at 21:56

## 2019-06-14 RX ADMIN — LISINOPRIL 40 MG: 40 TABLET ORAL at 08:00

## 2019-06-14 RX ADMIN — ISOSORBIDE DINITRATE 10 MG: 5 TABLET ORAL at 21:56

## 2019-06-14 RX ADMIN — VANCOMYCIN HYDROCHLORIDE 125 MG: KIT at 18:36

## 2019-06-14 RX ADMIN — AMLODIPINE BESYLATE 10 MG: 10 TABLET ORAL at 08:00

## 2019-06-14 RX ADMIN — GABAPENTIN 1600 MG: 400 CAPSULE ORAL at 21:56

## 2019-06-14 RX ADMIN — MEROPENEM 1 G: 1 INJECTION, POWDER, FOR SOLUTION INTRAVENOUS at 16:17

## 2019-06-14 RX ADMIN — ACETAMINOPHEN AND CODEINE PHOSPHATE 2 TABLET: 300; 30 TABLET ORAL at 08:00

## 2019-06-14 RX ADMIN — CALCIUM 500 MG: 500 TABLET ORAL at 21:56

## 2019-06-14 RX ADMIN — POTASSIUM CHLORIDE 40 MEQ: 1500 TABLET, EXTENDED RELEASE ORAL at 12:01

## 2019-06-14 RX ADMIN — GABAPENTIN 800 MG: 400 CAPSULE ORAL at 16:17

## 2019-06-14 RX ADMIN — POTASSIUM CHLORIDE 20 MEQ: 1500 TABLET, EXTENDED RELEASE ORAL at 13:55

## 2019-06-14 RX ADMIN — CALCIUM 500 MG: 500 TABLET ORAL at 08:00

## 2019-06-14 RX ADMIN — MELATONIN 2000 UNITS: at 18:36

## 2019-06-14 RX ADMIN — SODIUM CHLORIDE: 900 INJECTION INTRAVENOUS at 02:44

## 2019-06-14 RX ADMIN — SERTRALINE HYDROCHLORIDE 100 MG: 100 TABLET ORAL at 08:00

## 2019-06-14 RX ADMIN — MEROPENEM 1 G: 1 INJECTION, POWDER, FOR SOLUTION INTRAVENOUS at 08:01

## 2019-06-14 RX ADMIN — METOPROLOL SUCCINATE 100 MG: 100 TABLET, EXTENDED RELEASE ORAL at 08:00

## 2019-06-14 RX ADMIN — CLONIDINE HYDROCHLORIDE 0.1 MG: 0.1 TABLET ORAL at 21:56

## 2019-06-14 RX ADMIN — ISOSORBIDE DINITRATE 10 MG: 5 TABLET ORAL at 08:00

## 2019-06-14 RX ADMIN — VANCOMYCIN HYDROCHLORIDE 125 MG: KIT at 09:06

## 2019-06-14 RX ADMIN — ISOSORBIDE DINITRATE 10 MG: 5 TABLET ORAL at 16:17

## 2019-06-14 RX ADMIN — ONDANSETRON 4 MG: 4 TABLET, ORALLY DISINTEGRATING ORAL at 08:00

## 2019-06-14 ASSESSMENT — ACTIVITIES OF DAILY LIVING (ADL)
ADLS_ACUITY_SCORE: 14

## 2019-06-14 ASSESSMENT — MIFFLIN-ST. JEOR: SCORE: 932.11

## 2019-06-14 NOTE — PLAN OF CARE
Vitals are Temp: 98  F (36.7  C) Temp src: Oral BP: 163/59   Heart Rate: 64 Resp: 24 SpO2: 94 %.  Patient is A&O x3. Disoriented to time. SBA with Activity.  Patient is on Contact precuations for ESBL.   and   Patient is on Enteric precuations for C-Diff.  Pt is on a Clear liquid diet with no red food or liquid. Denying pain.  Patient has Normal Saline 0.9% running at 100 mL per hour. Patient is having loose stools. 1 unit blood given yesterday for hgb of 6.8. Most recent hgb was 8.6 Monitor for bloody stools and notify MD.

## 2019-06-14 NOTE — PLAN OF CARE
VSS. Up with standby assist. No c/o SOB. C/O headache 7/10 this evening. PRN Oxycodone given x1 with relief. On clear liquid diet, no red foods or red liquid. Voiding well. No stool this shift, will continue to monitor for bloody stools. On PO Vanco. On IV Merrem. GI following. PIVF running NS at 100 ml/hr. Will continue to monitor and follow POC.

## 2019-06-14 NOTE — PROGRESS NOTES
[facility]  Hospitalist Progress Note    Andre Alexander MD 06/14/2019  Text Page      Reason for Stay (Diagnosis): UTI and hematochezia         Assessment and Plan:      Summary of Stay: Summary of Stay: Daya Ignacio is a 88 year old female with past medical history HTN, HLD, vision loss, anxiety, chronic pain on Tylenol 3, and recent admission from 5/26 through 5/31 here for ESBL UTI sent home on Bactrim who was admitted on 6/10/2019  after 24 hours of malaise, reported fevers at home, return of dysuria and urinary frequency consistent with ongoing UTI.  Urinalysis with gross pyuria.  Started on meropenem for suspected ESBL UTI.  Afebrile, vital signs stable, no leukocytosis.  Lactic acid was elevated at 2.8 that resolved with IV fluid bolus.       Problem List/Assessment and Plan:   ESBL UTI:   --Hospitalized here from 5/26 through 5/31 for ESBL E. coli UTI.   --Urine culture here again on 6/10 grew out ESBL E. Coli  -Continue IV meropenem (started 6/10)--1 to 2 days more per ID recommendations  -Infectious disease consulting  -Nausea resolved.     Acute GI bleed:  Initially patient had episode of darker emesis and  has since had  multiple episodes of dark red bloody liquid stools concerning for acute GI bleed.   -No significant bloody stools over the past 24 hours and hemoglobin is remained stable since transfusion.   -Transfuse 1 unit of packed red blood cells on 6/12.  - She does take an 81 mg aspirin daily and uses ibuprofen approximately 3 times per week.  Both were discontinued.   -EGD showed no source of bleeding on 6/11  -Lower source seems likely with a differential diagnosis including diverticular bleed versus infectious colitis versus ischemic colitis  -On a clear liquid diet for now with no red material.  Advance to full liquid diet today.  If no further significant bleeding advance to regular diet tomorrow  -Stopped Protonix since there is no evidence of upper GI  bleed  -Initially she declined colonoscopy.  Per gastroenterology there is no role for colonoscopy at this point.  -[facility] following.  They recommend tagged RBC scan if bleeding continues.  -Plan is to continue to monitor for bleeding, monitor hemoglobin, transfuse if hemoglobin less than 7 and obtain tagged RBC scan if has recurrent clinical bleeding.  --If no further bleeding advance diet and prepare for discharge in the next 1 to 2 days.      C. difficile colitis:  -cdif pcr positive, started oral vancomycin 125mg qid po on 6/11.  Complete 14 days.     Elevated lactic acid: Lactic acid elevated to 2.8 in setting of infection.  Not acidotic or hypotensive.  Improved to 1.7 with IV fluids.     HTN:  -BP a bit elevated in the morning.  Improves after medication administration.  - Continue PTA amlodipine 10 mg daily, Imdur 10 mg daily, metoprolol  mg daily lisinopril 40 g daily, clonidine 0.1 mg twice daily with hold parameters for low blood pressure.     HLD: Not currently on any medications.     Vision loss: Chronic.  History of macular degeneration along with retinal vein occlusion on the right.     Chronic pain: History of hip fracture.  Chronic sciatica pain.  Continue PTA Tylenol 3 every 8 hours as needed along with gabapentin 800 mg twice daily and 1600 mg at bedtime.     Anxiety: Continue PTA sertraline.        DVT Prophylaxis: Pneumatic Compression Devices  Code Status: Full Code  Disposition Plan   Expected discharge in 2 days to prior living arrangement once bleeding resolved, tolerating diet and hemoglobin stable.     Entered: Andre Alexander 06/14/2019, 12:43 PM     Incidental Findings/ Discharge Issues: None        Interval History (Subjective):      Feeling well but a bit weak compared to her baseline.  She had a spot of blood with a bowel movement last night otherwise no more bloody stools.  She is hungry.                  Physical Exam:      Last Vital Signs:  BP  "130/51 (BP Location: Left arm)   Pulse 71   Temp 97.9  F (36.6  C) (Oral)   Resp 18   Ht 1.549 m (5' 1\")   Wt 56.5 kg (124 lb 8 oz)   SpO2 96%   BMI 23.52 kg/m        Vital signs reviewed  General:  Alert, calm, NAD  CV: regular rate and rhythm, no murmurs or rubs  Lungs:  Clear to ascultation bilaterally, normal respiratory effort  Abdomen:  Soft, nontender, nondistended, no masses, normal bowel sounds  Extremities:  No edema  Psychiatric:  Mood and affect within normal limits             Medications:      All current medications were reviewed with changes reflected in problem list.         Data:      All new lab and imaging data was reviewed.   Labs:  Hemoglobin 9.2 from 8.6 from 9.5 after transfusion from 6.7 from 7.3 from 8 from 9  Creatinine 0.5  "

## 2019-06-14 NOTE — PROGRESS NOTES
Patient alert and oriented.  Up with standby assistance.  Enteric and contact precautions maintained.  Clear liquid diet no red food or liquids.  BP slightly elevated otherwise VSS.  PRN tylenol with codeine for pain.  Lung sounds diminished/clear.  Bowel sounds active.  Will continue to monitor.

## 2019-06-14 NOTE — PROGRESS NOTES
"GASTROENTEROLOGY PROGRESS NOTE        SUBJECTIVE:  No evidence of GI bleeding over last 2 days.  No abdominal pain, nausea, vomiting, or fever.  No stools thus far today.  Hgb stable.      OBJECTIVE:    /51 (BP Location: Left arm)   Pulse 71   Temp 97.9  F (36.6  C) (Oral)   Resp 18   Ht 1.549 m (5' 1\")   Wt 56.5 kg (124 lb 8 oz)   SpO2 96%   BMI 23.52 kg/m    Temp (24hrs), Av.4  F (36.9  C), Min:97.8  F (36.6  C), Max:99.1  F (37.3  C)    Patient Vitals for the past 72 hrs:   Weight   19 0425 56.5 kg (124 lb 8 oz)   19 0254 57.1 kg (125 lb 14.4 oz)   19 0425 56.2 kg (123 lb 14.4 oz)   19 1352 55.8 kg (123 lb)       Intake/Output Summary (Last 24 hours) at 2019 0939  Last data filed at 2019 0426  Gross per 24 hour   Intake --   Output 200 ml   Net -200 ml        PHYSICAL EXAM     Constitutional: NAD  Cardiovascular: RRR, normal S1, S2, no murmur appreciated   Respiratory: CTAB  Abdomen: soft, BS+         Additional Comments:  ROS, FH, SH: See initial GI consult for details.     I have reviewed the patient's new clinical lab results:     Recent Labs   Lab Test 19  1108 19  1221 19  0642  19  1152  17  1259  11  0625 11  0533   WBC  --  6.1 4.7  --  5.0   < > 11.7*   < > 7.9  --    HGB 9.2* 8.6* 9.5*   < > 6.8*   < > 10.7*   < > 9.4* 7.9*   MCV  --  93 93  --  98   < > 93   < > 86  --    PLT  --  139* 141*  --  144*   < > 142*   < > 102* 80*   INR  --   --   --   --   --   --  1.18*  --  1.21* 1.30*    < > = values in this interval not displayed.     Recent Labs   Lab Test 19  1108 19  0642 19  0635 19  0810   POTASSIUM 3.2* 3.3* 3.7 3.6   CHLORIDE  --  109 111* 107   CO2  --  27 25 27   BUN  --  11 23 15   ANIONGAP  --  5 4 5     Recent Labs   Lab Test 06/10/19  1954 19  1703 19  1531 19  1022 18  1145 17  1525  11  0540   ALBUMIN  --   --  3.5 3.8 3.9  --    < >  -- "    BILITOTAL  --   --  0.6 0.7 1.0  --    < >  --    ALT  --   --  17 15 17  --    < >  --    AST  --   --  24 13 17  --    < >  --    PROTEIN 20* 20*  --   --   --  100*   < >  --    LIPASE  --   --  41*  --   --   --   --   --    AMYLASE  --   --   --   --   --   --   --  361*    < > = values in this interval not displayed.        ASSESSMENT/ PLAN  Daya Ignacio is an 88-year-old female with history of recurrent UTI developed nonbloody, dark emesis followed by several episodes of hematochezia.  She was found to be C. difficile positive;on Vanco.    1.  GI bleeding: EGD without findings of acute bleeding source.  Suspect lower GI bleed in the setting of infectious colitis - C. difficile positive.  Diverticular or ischemic colitis are other considerations.  HgB has been drifting down. HgB 6.8 yesterday with PRBC transfusion and good response, stable over last 2 days 9.1-->9.5-->9.2.  2. Recurrent UTI: on meropenem, ID following  3. C.difficile    Plan:   --Serial HgB, transfusions as needed per medicine  --Patient stable. Suspect infectious colitis as source of bleeding. No bleeding in last 2 days; hgb stable. If recurrent rectal bleeding with dropping HgB would suggest urgent tagged RBC scan/imaging.   --Vancomycin QID x14 days for C. Difficile treatment   --Will arrange outpatient followup with our group, we will call the patient next week to have this arranged   --No further GI recommendations at this time. Will sign off. Please call with any questions or if bleeding reoccurs  --Will discuss with Dr. Zane Calloway,  CNP   Cell:  [phone]  [facility] ( [facility])

## 2019-06-14 NOTE — PROGRESS NOTES
[facility]  Infectious Disease Progress Note          Assessment and Plan:   IMPRESSION:   1.  An 88-year-old female with vague generalized symptoms, malaise, suprapubic discomfort and some urinary symptoms, grossly abnormal urine culture growing E. coli, all occurring in the context of recent ESBL UTI being appropriately treated with Septra, if truly same organism still SULFA sensitive, presumably some anatomic abnormality, no imaging to date.   2.  Recent extended-spectrum beta-lactamases Escherichia coli UTI that was SULFA sensitive being treated with Septra with initial improvement in symptoms.   3.  History of recurrent urinary tract infections, although on careful review previous Urology investigation felt most of her symptoms were not UTI related and rather related to vaginal and nonspecific issues.   4.  Hematochezia is a new GI problem, currently also having diarrhea, I suppose C. diff a consideration.   5.  MACRODANTIN, SULFA listed allergies, tolerated the SULFA when given recently.   6.  Chronic pain syndrome.  7 Diarrhea, C diff +      RECOMMENDATIONS:   1.  On meropenem, organism has evolved SULFA resistance, this is likely the issue, hold on imaging of the urologic system. Sxs improved, 5 days so to tomorrow and stop, try to limit based on new c diff  2.  Surprise + C. diff , vanco po 2 weeks, makes desirable to limit ABX ,   3 GI bleeding controlled            Interval History:   no new complaints less bleeding transfusion now, C diff +, UC now sulfa R              Medications:       amLODIPine  10 mg Oral Daily     calcium carbonate 500 mg (elemental)  500 mg Oral BID     cloNIDine  0.1 mg Oral BID     gabapentin  1,600 mg Oral At Bedtime     gabapentin  800 mg Oral 2 times per day     isosorbide dinitrate  10 mg Oral TID     lisinopril  40 mg Oral Daily     meropenem  1 g Intravenous Q8H     metoprolol succinate ER  100 mg Oral Daily     sertraline  100 mg Oral Daily      "vancomycin  125 mg Oral 4x Daily     vitamin D3  2,000 Units Oral Daily with supper                  Physical Exam:   Blood pressure (!) 131/33, pulse 71, temperature 97.3  F (36.3  C), temperature source Oral, resp. rate 18, height 1.549 m (5' 1\"), weight 56.5 kg (124 lb 8 oz), SpO2 96 %, not currently breastfeeding.  Wt Readings from Last 2 Encounters:   06/14/19 56.5 kg (124 lb 8 oz)   05/26/19 58.2 kg (128 lb 4.8 oz)     Vital Signs with Ranges  Temp:  [97.3  F (36.3  C)-98  F (36.7  C)] 97.3  F (36.3  C)  Heart Rate:  [50-66] 57  Resp:  [18-24] 18  BP: (130-177)/(33-76) 131/33  SpO2:  [94 %-97 %] 96 %    Constitutional: Awake, alert, cooperative, no apparent distress   Lungs: Clear to auscultation bilaterally, no crackles or wheezing   Cardiovascular: Regular rate and rhythm, normal S1 and S2, and no murmur noted   Abdomen: Normal bowel sounds, soft, non-distended, non-tender   Skin: No rashes, no cyanosis, no edema   Other:           Data:   All microbiology laboratory data reviewed.  Recent Labs   Lab Test 06/14/19  1108 06/13/19  1221 06/13/19  0642  06/12/19  1152   WBC  --  6.1 4.7  --  5.0   HGB 9.2* 8.6* 9.5*   < > 6.8*   HCT  --  26.4* 29.6*  --  21.1*   MCV  --  93 93  --  98   PLT  --  139* 141*  --  144*    < > = values in this interval not displayed.     Recent Labs   Lab Test 06/13/19  0642 06/12/19  0635 06/11/19  0810   CR 0.50* 0.65 0.61     No lab results found.  Recent Labs   Lab Test 06/10/19  2206 06/10/19  2155 06/10/19  1954 05/26/19  1820 05/26/19  1813 05/26/19  1703 09/06/17  1525 08/11/17  1005 06/28/17  1830   CULT No growth after 3 days No growth after 3 days >100,000 colonies/mL  Escherichia coli ESBL  *  Enterobacteriaceae that are susceptible to meropenem are usually susceptible to ertapenem.  ESBL (extended beta lactamase) producing organisms require contact precautions. No growth No growth 50,000 to 100,000 colonies/mL  Escherichia coli ESBL  *  Critical Value/Significant " Value called to and read back by  VEE DUNLAP RN ([email or web address]).  05.28.19  2112 GJS    Enterobacteriaceae that are susceptible to meropenem are usually susceptible to ertapenem.  ESBL (extended beta lactamase) producing organisms require contact precautions. >100,000 colonies/mL  mixed urogenital jorge   >100,000 colonies/mL Enterococcus faecalis  <10,000 colonies/mL mixed urogenital jorge Susceptibility testing not routinely   done  * 10,000 to 50,000 colonies/mL urogenital jorge  Susceptibility testing not routinely done

## 2019-06-14 NOTE — PLAN OF CARE
Hx: HTN, HLD, chronic pain, anxiety   Orientation/Neuro: AOx4 but forgetful at times  VS:  BP elevated this AM, scheduled meds given recheck 130/51, afeb, on RA sating 97%  Pain: Co 8/10 pain headache, tylenol #3 given with relief           Labs/Protocols: Hgb 9.2, K+ 3.2, redraw will be at 1800  LS: dim/equal  Skin: bruised BUE  GI/: voiding ok, BS active, no BM this shift  IV/Drains: PIV SL  Mobility: SBA calls appro  Diet: advanced to full liquids, tolerating well  Plan: Monitor for bloody stools and notify MD, pt educated per POC.   Other: Will continue to monitor.

## 2019-06-15 LAB
ERYTHROCYTE [DISTWIDTH] IN BLOOD BY AUTOMATED COUNT: 15.1 % (ref 10–15)
HCT VFR BLD AUTO: 26.5 % (ref 35–47)
HGB BLD-MCNC: 8.5 G/DL (ref 11.7–15.7)
MCH RBC QN AUTO: 29.9 PG (ref 26.5–33)
MCHC RBC AUTO-ENTMCNC: 32.1 G/DL (ref 31.5–36.5)
MCV RBC AUTO: 93 FL (ref 78–100)
PLATELET # BLD AUTO: 141 10E9/L (ref 150–450)
RBC # BLD AUTO: 2.84 10E12/L (ref 3.8–5.2)
WBC # BLD AUTO: 4.1 10E9/L (ref 4–11)

## 2019-06-15 PROCEDURE — 85027 COMPLETE CBC AUTOMATED: CPT | Performed by: INTERNAL MEDICINE

## 2019-06-15 PROCEDURE — A9270 NON-COVERED ITEM OR SERVICE: HCPCS | Mod: GY | Performed by: INTERNAL MEDICINE

## 2019-06-15 PROCEDURE — 25000132 ZZH RX MED GY IP 250 OP 250 PS 637: Mod: GY | Performed by: INTERNAL MEDICINE

## 2019-06-15 PROCEDURE — 99232 SBSQ HOSP IP/OBS MODERATE 35: CPT | Performed by: INTERNAL MEDICINE

## 2019-06-15 PROCEDURE — 36415 COLL VENOUS BLD VENIPUNCTURE: CPT | Performed by: INTERNAL MEDICINE

## 2019-06-15 PROCEDURE — 25000128 H RX IP 250 OP 636: Performed by: INTERNAL MEDICINE

## 2019-06-15 PROCEDURE — 25000131 ZZH RX MED GY IP 250 OP 636 PS 637: Mod: GY | Performed by: INTERNAL MEDICINE

## 2019-06-15 PROCEDURE — 99207 ZZC CDG-MDM COMPONENT: MEETS LOW - DOWN CODED: CPT | Performed by: INTERNAL MEDICINE

## 2019-06-15 PROCEDURE — 12000000 ZZH R&B MED SURG/OB

## 2019-06-15 RX ADMIN — VANCOMYCIN HYDROCHLORIDE 125 MG: KIT at 12:07

## 2019-06-15 RX ADMIN — GABAPENTIN 800 MG: 400 CAPSULE ORAL at 08:43

## 2019-06-15 RX ADMIN — LISINOPRIL 40 MG: 40 TABLET ORAL at 08:44

## 2019-06-15 RX ADMIN — METOPROLOL SUCCINATE 100 MG: 100 TABLET, EXTENDED RELEASE ORAL at 08:43

## 2019-06-15 RX ADMIN — ACETAMINOPHEN AND CODEINE PHOSPHATE 2 TABLET: 300; 30 TABLET ORAL at 08:43

## 2019-06-15 RX ADMIN — GABAPENTIN 800 MG: 400 CAPSULE ORAL at 16:01

## 2019-06-15 RX ADMIN — CALCIUM 500 MG: 500 TABLET ORAL at 21:19

## 2019-06-15 RX ADMIN — ISOSORBIDE DINITRATE 10 MG: 5 TABLET ORAL at 21:18

## 2019-06-15 RX ADMIN — MELATONIN 2000 UNITS: at 16:02

## 2019-06-15 RX ADMIN — AMLODIPINE BESYLATE 10 MG: 10 TABLET ORAL at 08:44

## 2019-06-15 RX ADMIN — MEROPENEM 1 G: 1 INJECTION, POWDER, FOR SOLUTION INTRAVENOUS at 00:44

## 2019-06-15 RX ADMIN — ONDANSETRON 4 MG: 4 TABLET, ORALLY DISINTEGRATING ORAL at 16:23

## 2019-06-15 RX ADMIN — CLONIDINE HYDROCHLORIDE 0.1 MG: 0.1 TABLET ORAL at 08:43

## 2019-06-15 RX ADMIN — ACETAMINOPHEN AND CODEINE PHOSPHATE 2 TABLET: 300; 30 TABLET ORAL at 19:44

## 2019-06-15 RX ADMIN — CALCIUM 500 MG: 500 TABLET ORAL at 08:44

## 2019-06-15 RX ADMIN — GABAPENTIN 1600 MG: 400 CAPSULE ORAL at 21:18

## 2019-06-15 RX ADMIN — CLONIDINE HYDROCHLORIDE 0.1 MG: 0.1 TABLET ORAL at 21:19

## 2019-06-15 RX ADMIN — VANCOMYCIN HYDROCHLORIDE 125 MG: KIT at 21:25

## 2019-06-15 RX ADMIN — ISOSORBIDE DINITRATE 10 MG: 5 TABLET ORAL at 16:02

## 2019-06-15 RX ADMIN — ONDANSETRON 4 MG: 4 TABLET, ORALLY DISINTEGRATING ORAL at 08:44

## 2019-06-15 RX ADMIN — SERTRALINE HYDROCHLORIDE 100 MG: 100 TABLET ORAL at 08:43

## 2019-06-15 RX ADMIN — MEROPENEM 1 G: 1 INJECTION, POWDER, FOR SOLUTION INTRAVENOUS at 08:44

## 2019-06-15 RX ADMIN — VANCOMYCIN HYDROCHLORIDE 125 MG: KIT at 17:50

## 2019-06-15 RX ADMIN — ISOSORBIDE DINITRATE 10 MG: 5 TABLET ORAL at 08:44

## 2019-06-15 RX ADMIN — VANCOMYCIN HYDROCHLORIDE 125 MG: KIT at 08:53

## 2019-06-15 ASSESSMENT — MIFFLIN-ST. JEOR: SCORE: 938.91

## 2019-06-15 ASSESSMENT — ACTIVITIES OF DAILY LIVING (ADL)
ADLS_ACUITY_SCORE: 14
ADLS_ACUITY_SCORE: 16
ADLS_ACUITY_SCORE: 14

## 2019-06-15 NOTE — PLAN OF CARE
ORIENTATION: A&O/Forgetful  VS: WNL  TELE: N/A  LUNGS: 99% RA; Clear  : ESBL in urine  GI: Diarrhea, last BM 6/15/19; Incontinent at times  IV: Saline Locked  PAIN: Denies  ACTIVITY: SBA  DIET: Low Fiber Diet  PLAN: Possible discharge tomorrow once Hgb stable, tolerates oral intake  OTHER: Zofran x 1 for nausea

## 2019-06-15 NOTE — PLAN OF CARE
VSS, afebrile and RA sats mid 90's.   Pt having more frequent incontinent loose black stools,  approx  every 30 min to an hour after eating full liquids for supper.  Up SBA and needs staff assist to clean up loose stools.  Cont on an IV  abx and po Vanco. K+ recheck was 3.7,no corrective action needed, recheck in am.  POC reviewed  with pt, questions answered.

## 2019-06-15 NOTE — PROGRESS NOTES
[facility]  Infectious Disease Progress Note          Assessment and Plan:   IMPRESSION:   1.  An 88-year-old female with vague generalized symptoms, malaise, suprapubic discomfort and some urinary symptoms, grossly abnormal urine culture growing E. coli, all occurring in the context of recent ESBL UTI being appropriately treated with Septra, if truly same organism still SULFA sensitive, presumably some anatomic abnormality, no imaging to date.   2.  Recent extended-spectrum beta-lactamases Escherichia coli UTI that was SULFA sensitive being treated with Septra with initial improvement in symptoms.   3.  History of recurrent urinary tract infections, although on careful review previous Urology investigation felt most of her symptoms were not UTI related and rather related to vaginal and nonspecific issues.   4.  Hematochezia is a new GI problem, currently also having diarrhea, I suppose C. diff a consideration.   5.  MACRODANTIN, SULFA listed allergies, tolerated the SULFA when given recently.   6.  Chronic pain syndrome.  7 Diarrhea, C diff +      RECOMMENDATIONS:   1.  On meropenem, organism has evolved SULFA resistance, this is likely the issue, hold on imaging of the urologic system. Sxs improved, 5 days will stop, try to limit based on new c diff  2.  Surprise + C. diff , vanco po 2 weeks, makes desirable to limit ABX ,   3 GI bleeding controlled            Interval History:   no new complaints less bleeding transfusion now, C diff +, UC now sulfa R              Medications:       amLODIPine  10 mg Oral Daily     calcium carbonate 500 mg (elemental)  500 mg Oral BID     cloNIDine  0.1 mg Oral BID     gabapentin  1,600 mg Oral At Bedtime     gabapentin  800 mg Oral 2 times per day     isosorbide dinitrate  10 mg Oral TID     lisinopril  40 mg Oral Daily     metoprolol succinate ER  100 mg Oral Daily     sertraline  100 mg Oral Daily     vancomycin  125 mg Oral 4x Daily     vitamin D3   "2,000 Units Oral Daily with supper                  Physical Exam:   Blood pressure 151/47, pulse 62, temperature 97.9  F (36.6  C), temperature source Oral, resp. rate 20, height 1.549 m (5' 1\"), weight 57.2 kg (126 lb), SpO2 98 %, not currently breastfeeding.  Wt Readings from Last 2 Encounters:   06/15/19 57.2 kg (126 lb)   05/26/19 58.2 kg (128 lb 4.8 oz)     Vital Signs with Ranges  Temp:  [97.3  F (36.3  C)-98.8  F (37.1  C)] 97.9  F (36.6  C)  Pulse:  [62] 62  Heart Rate:  [56-62] 56  Resp:  [20-24] 20  BP: (127-151)/(33-58) 151/47  SpO2:  [96 %-98 %] 98 %    Constitutional: Awake, alert, cooperative, no apparent distress   Lungs: Clear to auscultation bilaterally, no crackles or wheezing   Cardiovascular: Regular rate and rhythm, normal S1 and S2, and no murmur noted   Abdomen: Normal bowel sounds, soft, non-distended, non-tender   Skin: No rashes, no cyanosis, no edema   Other:           Data:   All microbiology laboratory data reviewed.  Recent Labs   Lab Test 06/15/19  0652 06/14/19  1108 06/13/19  1221 06/13/19  0642   WBC 4.1  --  6.1 4.7   HGB 8.5* 9.2* 8.6* 9.5*   HCT 26.5*  --  26.4* 29.6*   MCV 93  --  93 93   *  --  139* 141*     Recent Labs   Lab Test 06/13/19  0642 06/12/19  0635 06/11/19  0810   CR 0.50* 0.65 0.61     No lab results found.  Recent Labs   Lab Test 06/10/19  2206 06/10/19  2155 06/10/19  1954 05/26/19  1820 05/26/19  1813 05/26/19  1703 09/06/17  1525 08/11/17  1005 06/28/17  1830   CULT No growth after 4 days No growth after 4 days >100,000 colonies/mL  Escherichia coli ESBL  *  Enterobacteriaceae that are susceptible to meropenem are usually susceptible to ertapenem.  ESBL (extended beta lactamase) producing organisms require contact precautions. No growth No growth 50,000 to 100,000 colonies/mL  Escherichia coli ESBL  *  Critical Value/Significant Value called to and read back by  VEE DUNLAP RN ([email or web address]).  05.28.19  2112 GJS    Enterobacteriaceae that are " susceptible to meropenem are usually susceptible to ertapenem.  ESBL (extended beta lactamase) producing organisms require contact precautions. >100,000 colonies/mL  mixed urogenital jorge   >100,000 colonies/mL Enterococcus faecalis  <10,000 colonies/mL mixed urogenital jorge Susceptibility testing not routinely   done  * 10,000 to 50,000 colonies/mL urogenital jorge  Susceptibility testing not routinely done

## 2019-06-15 NOTE — PROGRESS NOTES
[facility]  Hospitalist Progress Note    Margarito Reina MD 06/15/2019    Reason for Stay (Diagnosis): UTI and hematochezia         Assessment and Plan:      Summary of Stay: Daya Ignacio is a 88 year old female with past medical history HTN, HLD, vision loss, anxiety, chronic pain, and recent admission from 5/26 through 5/31 here for ESBL UTI sent home on Bactrim who was admitted on 6/10/2019  after 24 hours of malaise, reported fevers at home, return of dysuria and urinary frequency consistent with ongoing UTI.  Urinalysis with gross pyuria.  Started on meropenem for suspected ESBL UTI.  Afebrile, vital signs stable, no leukocytosis.  Lactic acid was elevated at 2.8 that resolved with IV fluid bolus.       Problem List/Assessment and Plan:   1. ESBL UTI:   -Hospitalized here from 5/26 through 5/31 for ESBL E. coli UTI.   --Urine culture here again on 6/10 grew out ESBL E. Coli, now resistant to bactrim as well.  -Continue IV meropenem (started 6/10) per ID recommendations, input appreciated.  -Nausea resolved.     2. Acute blood loss anemia due to Acute GI bleed:  Initially patient had episode of darker emesis and  has since had  multiple episodes of dark red bloody liquid stools concerning for acute GI bleed.   -No significant bloody stools over the past 2 days and hemoglobin is remained is 805 today, fairly stable.   -Transfused 1 unit of packed red blood cells on 6/12.  - She does take an 81 mg aspirin daily and uses ibuprofen approximately 3 times per week.  Both were discontinued.   -EGD showed no source of bleeding on 6/11  -Lower source seems likely with a differential diagnosis including diverticular bleed versus infectious colitis versus ischemic colitis  -Advance diet to low fiber today. If no further significant bleeding advance to regular diet tomorrow  -Stopped Protonix since there is no evidence of upper GI bleed  -Initially she declined colonoscopy.  Per gastroenterology there  "is no role for colonoscopy at this point.  -[facility] following.  They recommend tagged RBC scan if bleeding continues.  -Plan is to continue to monitor for bleeding, monitor hemoglobin, transfuse if hemoglobin less than 7 and obtain tagged RBC scan if has recurrent clinical bleeding.  --If no further bleeding advance diet and prepare for discharge in the next 1 day.      3. C. difficile colitis:  -cdif pcr positive, started oral vancomycin 125mg qid po on 6/11.  Complete 14 days.     4. Elevated lactic acid: Lactic acid elevated to 2.8 in setting of infection.  Not acidotic or hypotensive.  Improved to 1.7 with IV fluids.     5. HTN:  -BP remained on the higher side this morning.   - Continue PTA amlodipine 10 mg daily, Imdur 10 mg daily, metoprolol  mg daily lisinopril 40 g daily, clonidine 0.1 mg twice daily with hold parameters for low blood pressure.      6. Chronic pain: History of hip fracture.  Chronic sciatica pain.  Continue PTA Tylenol 3 every 8 hours as needed along with gabapentin 800 mg twice daily and 1600 mg at bedtime.     7. Anxiety: Continue PTA sertraline.        DVT Prophylaxis: Pneumatic Compression Devices  Code Status: Full Code  Disposition Plan   Expected discharge in 1 day to prior living arrangement once Hgb is stable, tolerates oral intake, no bleeding, hemoglobin stable.     Entered: Margarito Reina 06/15/2019, 10:59 AM     Incidental Findings/ Discharge Issues: None  I discussed with patient the plan of care.        Interval History (Subjective):      Patient seen and examined, assumed care today, no new issues, feel about the same, stated she felt weaker today.She had a spot of blood with a bowel movement last night otherwise no more bloody stools.                    Physical Exam:      Last Vital Signs:  /47 (BP Location: Left arm)   Pulse 62   Temp 97.9  F (36.6  C) (Oral)   Resp 20   Ht 1.549 m (5' 1\")   Wt 57.2 kg (126 lb)   SpO2 98%   BMI " 23.81 kg/m        Vital signs reviewed  General:  Alert, calm, NAD  CV: regular rate and rhythm, no murmurs or rubs  Lungs:  Clear to ascultation bilaterally, normal respiratory effort  Abdomen:  Soft, nontender, nondistended, no masses, normal bowel sounds  Extremities:  No edema  Psychiatric:  Mood and affect within normal limits         Medications:      All current medications were reviewed with changes reflected in problem list.  Current Facility-Administered Medications   Medication     acetaminophen-codeine (TYLENOL #3) 300-30 MG per tablet 1-2 tablet     amLODIPine (NORVASC) tablet 10 mg     calcium carbonate 500 mg (elemental) (OSCAL;OYSTER SHELL CALCIUM) tablet 500 mg     cloNIDine (CATAPRES) tablet 0.1 mg     gabapentin (NEURONTIN) capsule 1,600 mg     gabapentin (NEURONTIN) capsule 800 mg     isosorbide dinitrate (ISORDIL) tablet 10 mg     lisinopril (PRINIVIL/ZESTRIL) tablet 40 mg     melatonin tablet 1 mg     meropenem (MERREM) 1 g vial to attach to  mL bag     metoprolol succinate ER (TOPROL-XL) 24 hr tablet 100 mg     naloxone (NARCAN) injection 0.1-0.4 mg     ondansetron (ZOFRAN-ODT) ODT tab 4 mg    Or     ondansetron (ZOFRAN) injection 4 mg     oxyCODONE (ROXICODONE) tablet 5 mg     potassium chloride (KLOR-CON) Packet 20-40 mEq     potassium chloride 10 mEq in 100 mL intermittent infusion with 10 mg lidocaine     potassium chloride 10 mEq in 100 mL sterile water intermittent infusion (premix)     potassium chloride ER (K-DUR/KLOR-CON M) CR tablet 20-40 mEq     prochlorperazine (COMPAZINE) injection 5 mg     sertraline (ZOLOFT) tablet 100 mg     vancomycin (FIRVANQ) oral solution 125 mg     vitamin D3 (CHOLECALCIFEROL) 1000 units (25 mcg) tablet 2,000 Units            Data:      All new lab and imaging data was reviewed.   Labs:  Hemoglobin 9.2 from 8.6 from 9.5 after transfusion from 6.7 from 7.3 from 8 from 9  Creatinine 0.5

## 2019-06-15 NOTE — PLAN OF CARE
AOx4 forgetful at times. VSS, afeb, on RA sating 98%. Co 5/10 pain abdominal discomfort, and headache. Tylenol #3 given with relief. Iso-enteric for C-diff and ESBL. Co mild nausea before AM meal PO zofran given x1 with relief. Pt educated on POC, all questions answered. Continue IV anbx, monitor for bleeding/bloody stools. Possible discharge in 2 days if hgb stable. Will continue to monitor.

## 2019-06-15 NOTE — PLAN OF CARE
Orientation: Alert and oriented x 4  VSS. 97% on RA  HR 56.   LS: clear and diminished   GI:  Passing gas. 1 loose BM. Denies N/V.   : Adequate urine output.   Skin: clean and dry with some bruising   Activity: SBA. GB. Pt slept comfortably throughout shift.   Pain: 8/10. Pt had headache and had cold applied and lights decreased. Had no complaints of pain.  Plan: Continue with current cares. Pt has been having frequent stools and is C DIFF +. On potassium protocol, Potassium was 3.7, recheck in AM. EGD clear. On IV Merrem and PO Vanco. ID following. Discharge TBD. Continue POC.

## 2019-06-16 VITALS
HEIGHT: 61 IN | RESPIRATION RATE: 20 BRPM | TEMPERATURE: 96.9 F | OXYGEN SATURATION: 97 % | BODY MASS INDEX: 23.92 KG/M2 | HEART RATE: 62 BPM | DIASTOLIC BLOOD PRESSURE: 45 MMHG | SYSTOLIC BLOOD PRESSURE: 151 MMHG | WEIGHT: 126.7 LBS

## 2019-06-16 LAB
ANION GAP SERPL CALCULATED.3IONS-SCNC: 3 MMOL/L (ref 3–14)
BUN SERPL-MCNC: 14 MG/DL (ref 7–30)
CALCIUM SERPL-MCNC: 8.7 MG/DL (ref 8.5–10.1)
CHLORIDE SERPL-SCNC: 105 MMOL/L (ref 94–109)
CO2 SERPL-SCNC: 33 MMOL/L (ref 20–32)
CREAT SERPL-MCNC: 0.58 MG/DL (ref 0.52–1.04)
GFR SERPL CREATININE-BSD FRML MDRD: 82 ML/MIN/{1.73_M2}
GLUCOSE SERPL-MCNC: 91 MG/DL (ref 70–99)
HGB BLD-MCNC: 8.3 G/DL (ref 11.7–15.7)
POTASSIUM SERPL-SCNC: 3.8 MMOL/L (ref 3.4–5.3)
SODIUM SERPL-SCNC: 141 MMOL/L (ref 133–144)

## 2019-06-16 PROCEDURE — 36415 COLL VENOUS BLD VENIPUNCTURE: CPT | Performed by: INTERNAL MEDICINE

## 2019-06-16 PROCEDURE — 85018 HEMOGLOBIN: CPT | Performed by: INTERNAL MEDICINE

## 2019-06-16 PROCEDURE — 80048 BASIC METABOLIC PNL TOTAL CA: CPT | Performed by: INTERNAL MEDICINE

## 2019-06-16 PROCEDURE — 99239 HOSP IP/OBS DSCHRG MGMT >30: CPT | Performed by: INTERNAL MEDICINE

## 2019-06-16 PROCEDURE — 25000132 ZZH RX MED GY IP 250 OP 250 PS 637: Mod: GY | Performed by: INTERNAL MEDICINE

## 2019-06-16 PROCEDURE — A9270 NON-COVERED ITEM OR SERVICE: HCPCS | Mod: GY | Performed by: INTERNAL MEDICINE

## 2019-06-16 RX ORDER — VANCOMYCIN HYDROCHLORIDE 125 MG/1
125 CAPSULE ORAL 4 TIMES DAILY
Qty: 56 CAPSULE | Refills: 0 | Status: ON HOLD | OUTPATIENT
Start: 2019-06-16 | End: 2019-07-07

## 2019-06-16 RX ADMIN — AMLODIPINE BESYLATE 10 MG: 10 TABLET ORAL at 09:21

## 2019-06-16 RX ADMIN — CALCIUM 500 MG: 500 TABLET ORAL at 09:21

## 2019-06-16 RX ADMIN — LISINOPRIL 40 MG: 40 TABLET ORAL at 09:21

## 2019-06-16 RX ADMIN — METOPROLOL SUCCINATE 100 MG: 100 TABLET, EXTENDED RELEASE ORAL at 09:21

## 2019-06-16 RX ADMIN — ISOSORBIDE DINITRATE 10 MG: 5 TABLET ORAL at 09:21

## 2019-06-16 RX ADMIN — SERTRALINE HYDROCHLORIDE 100 MG: 100 TABLET ORAL at 09:21

## 2019-06-16 RX ADMIN — VANCOMYCIN HYDROCHLORIDE 125 MG: KIT at 09:22

## 2019-06-16 RX ADMIN — CLONIDINE HYDROCHLORIDE 0.1 MG: 0.1 TABLET ORAL at 09:21

## 2019-06-16 RX ADMIN — GABAPENTIN 800 MG: 400 CAPSULE ORAL at 09:20

## 2019-06-16 RX ADMIN — VANCOMYCIN HYDROCHLORIDE 125 MG: KIT at 12:19

## 2019-06-16 ASSESSMENT — MIFFLIN-ST. JEOR: SCORE: 942.09

## 2019-06-16 ASSESSMENT — ACTIVITIES OF DAILY LIVING (ADL)
ADLS_ACUITY_SCORE: 16
ADLS_ACUITY_SCORE: 14
ADLS_ACUITY_SCORE: 16
ADLS_ACUITY_SCORE: 16

## 2019-06-16 NOTE — PLAN OF CARE
AOx4. VSS, afeb, on RA, denies pain at this time. Hgb 8.3. No BM this shift. On PO Vanco for C-diff+, Iso contact for ESBL. Voiding ok. Tolerating low fiber diet. Ambulated in halls with staff, up SBA. Plan - discharge this afternoon back to prior living arrangement. Pt family will transport.

## 2019-06-16 NOTE — PROGRESS NOTES
D: DOM is following to coordinate return to [facility]. Pt will discharge today.   A: Pt is independent at [facility]. DOM sent the PT/RN home care resumption order to [facility].   P: To to return to the [facility] with [facility] today.     MARELY Garg  Casual DOM [id number]

## 2019-06-16 NOTE — PROGRESS NOTES
[facility]  Infectious Disease Progress Note          Assessment and Plan:   IMPRESSION:   1.  An 88-year-old female with vague generalized symptoms, malaise, suprapubic discomfort and some urinary symptoms, grossly abnormal urine culture growing E. coli, all occurring in the context of recent ESBL UTI being appropriately treated with Septra, if truly same organism still SULFA sensitive, presumably some anatomic abnormality, no imaging to date.   2.  Recent extended-spectrum beta-lactamases Escherichia coli UTI that was SULFA sensitive being treated with Septra with initial improvement in symptoms.   3.  History of recurrent urinary tract infections, although on careful review previous Urology investigation felt most of her symptoms were not UTI related and rather related to vaginal and nonspecific issues.   4.  Hematochezia is a new GI problem, currently also having diarrhea, I suppose C. diff a consideration.   5.  MACRODANTIN, SULFA listed allergies, tolerated the SULFA when given recently.   6.  Chronic pain syndrome.  7 Diarrhea, C diff +      RECOMMENDATIONS:   1.  On meropenem, organism has evolved SULFA resistance, this is likely the issue, hold on imaging of the urologic system. Sxs improved, 5 days will stop, try to limit based on new c diff  2.  Surprise + C. diff , vanco po 2 weeks, makes desirable to limit ABX ,   3 GI bleeding controlled OKm disposition            Interval History:   no new complaints less bleeding transfusion now, C diff +, UC now sulfa R              Medications:       amLODIPine  10 mg Oral Daily     calcium carbonate 500 mg (elemental)  500 mg Oral BID     cloNIDine  0.1 mg Oral BID     gabapentin  1,600 mg Oral At Bedtime     gabapentin  800 mg Oral 2 times per day     isosorbide dinitrate  10 mg Oral TID     lisinopril  40 mg Oral Daily     metoprolol succinate ER  100 mg Oral Daily     sertraline  100 mg Oral Daily     vancomycin  125 mg Oral 4x Daily      "vitamin D3  2,000 Units Oral Daily with supper                  Physical Exam:   Blood pressure 151/45, pulse 62, temperature 96.9  F (36.1  C), temperature source Oral, resp. rate 20, height 1.549 m (5' 1\"), weight 57.5 kg (126 lb 11.2 oz), SpO2 97 %, not currently breastfeeding.  Wt Readings from Last 2 Encounters:   06/16/19 57.5 kg (126 lb 11.2 oz)   05/26/19 58.2 kg (128 lb 4.8 oz)     Vital Signs with Ranges  Temp:  [96.9  F (36.1  C)-97.7  F (36.5  C)] 96.9  F (36.1  C)  Heart Rate:  [60-87] 87  Resp:  [20] 20  BP: (131-151)/(41-52) 151/45  SpO2:  [92 %-99 %] 97 %    Constitutional: Awake, alert, cooperative, no apparent distress   Lungs: Clear to auscultation bilaterally, no crackles or wheezing   Cardiovascular: Regular rate and rhythm, normal S1 and S2, and no murmur noted   Abdomen: Normal bowel sounds, soft, non-distended, non-tender   Skin: No rashes, no cyanosis, no edema   Other:           Data:   All microbiology laboratory data reviewed.  Recent Labs   Lab Test 06/16/19  0652 06/15/19  0652 06/14/19  1108 06/13/19  1221 06/13/19  0642   WBC  --  4.1  --  6.1 4.7   HGB 8.3* 8.5* 9.2* 8.6* 9.5*   HCT  --  26.5*  --  26.4* 29.6*   MCV  --  93  --  93 93   PLT  --  141*  --  139* 141*     Recent Labs   Lab Test 06/16/19  0652 06/13/19  0642 06/12/19  0635   CR 0.58 0.50* 0.65     No lab results found.  Recent Labs   Lab Test 06/10/19  2206 06/10/19  2155 06/10/19  1954 05/26/19  1820 05/26/19  1813 05/26/19  1703 09/06/17  1525 08/11/17  1005 06/28/17  1830   CULT No growth after 5 days No growth after 5 days >100,000 colonies/mL  Escherichia coli ESBL  *  Enterobacteriaceae that are susceptible to meropenem are usually susceptible to ertapenem.  ESBL (extended beta lactamase) producing organisms require contact precautions. No growth No growth 50,000 to 100,000 colonies/mL  Escherichia coli ESBL  *  Critical Value/Significant Value called to and read back by  VEE DUNLAP RN ([email or web address]).  05.28.19  " 2112 GJS    Enterobacteriaceae that are susceptible to meropenem are usually susceptible to ertapenem.  ESBL (extended beta lactamase) producing organisms require contact precautions. >100,000 colonies/mL  mixed urogenital jorge   >100,000 colonies/mL Enterococcus faecalis  <10,000 colonies/mL mixed urogenital jorge Susceptibility testing not routinely   done  * 10,000 to 50,000 colonies/mL urogenital jorge  Susceptibility testing not routinely done

## 2019-06-16 NOTE — PLAN OF CARE
"VS /41   Pulse 62   Temp 96.9  F (36.1  C) (Oral)   Resp 20   Ht 1.549 m (5' 1\")   Wt 57.5 kg (126 lb 11.2 oz)   SpO2 92%   BMI 23.94 kg/m    Lung sounds Clear but diminished through lobes  O2 Room Air  Tele none  Bowel sounds Active and audible in all quadrants  Tolerating Low Fiber Diet  IVF Saline Locked  Dressings none  Tests Recheck of Hgb in the A.M.  CMS intact  Drains none  Activity up with SBA assist  Pain denies  Patient/family centered care Support given and questions answered  Discharge plan Possible discharge today 06/16 if Hgb stable. Stools are less frequent and are dark brown. Denied any pain, rested throughout the night. Contact for ESBL in Urine and Enteric for Cdiff maintained. Continue POC.    "

## 2019-06-16 NOTE — PLAN OF CARE
AVS reviewed with pt. All questions answered. Pt denies any further questions or concerns. PIV removed, no complications. Telemetry monitor removed. All belongings returned. Pt escorted to front door by [facility] staff.

## 2019-06-17 ENCOUNTER — PATIENT OUTREACH (OUTPATIENT)
Dept: CARE COORDINATION | Facility: CLINIC | Age: 84
End: 2019-06-17

## 2019-06-17 ENCOUNTER — TELEPHONE (OUTPATIENT)
Dept: FAMILY MEDICINE | Facility: CLINIC | Age: 84
End: 2019-06-17

## 2019-06-17 ASSESSMENT — ACTIVITIES OF DAILY LIVING (ADL): DEPENDENT_IADLS:: INDEPENDENT

## 2019-06-17 NOTE — LETTER
[facility] CARE COORDINATION  [address]  [place] [facility] [address]    June 17, 2019    Daya Ignacio  [address]   [place] [facility] [address]      Dear Daya,    I am a clinic care coordinator who works with Marcy Soares PA-C at [facility]. I wanted to introduce myself and provide you with my contact information so that you can call me with questions or concerns about your health care. Below is a description of clinic care coordination and how I can further assist you.     The clinic care coordinator is a registered nurse and/or  who understand the health care system. The goal of clinic care coordination is to help you manage your health and improve access to the [facility] in the most efficient manner. The registered nurse can assist you in meeting your health care goals by providing education, coordinating services, and strengthening the communication among your providers. The  can assist you with financial, behavioral, psychosocial, chemical dependency, counseling, and/or psychiatric resources.    Please feel free to contact me at [phone], with any questions or concerns. We at [facility] are focused on providing you with the highest-quality healthcare experience possible and that all starts with you.     Sincerely,     Bre Bowie RN  Care Coordination  Phone:  [phone]  Email: [email or web address]

## 2019-06-17 NOTE — PROGRESS NOTES
Clinic Care Coordination Contact  Care Coordination Communication    Referral Source: IP Report    Clinical Data: Patient was hospitalized at [facility] from 6/10 to 6/16 with diagnosis of recurrent UTI and GI Bleed.  Patient was recently admitted to [facility] 5/26-5/31 for treatment of ESBL UTI. Patient followed by infectious disease during both hospitalizations.  Patient developed C diff.  Patient discharged home on oral vanco and will resume home care services.    Patient resides at [facility] and receives no service/meals through them.    Home Care Contact:              Home Care Agency: [facility] RN/PT              Contact name () and phone number: ROBERTA Hall [phone]              Care Coordination contacted home care: Yes              Resumption of care date: 6/17/19    Plan:  Patient is independent at baseline. She has family involved in care and are supportive. Do not anticipate any care coordination needs at this time.  No outreach will be made unless new referral is made or a change in the pt's status occurs. Will send care coordination intro letter and contact info.    Bre Bowie RN  Care Coordination  Phone:  [phone]  Email: [email or web address]

## 2019-06-17 NOTE — PROGRESS NOTES
Transition Communication Hand-off for Care Transitions to Next Level of Care Provider    Name: Daya Ignacio  : 10/11/1930  MRN #: [id number]  Primary Care Provider: Marcy Tran  Primary Care MD Name: Dr Tran  Primary Clinic: [address] [facility] [address]  Primary Care Clinic Name: [facility]  Reason for Hospitalization:  Generalized weakness [R53.1]  History of ESBL E. coli infection [Z86.19]  UTI (urinary tract infection) with pyuria [N39.0]  Admit Date/Time: 6/10/2019  7:07 PM  Discharge Date:   Payor Source: Payor: MEDICARE / Plan: MEDICARE / Product Type: Medicare /     Readmission Assessment Measure (EMI) Risk Score/category: Elevated    Reason for Communication Hand-off Referral: Avoidable readmission within 30 days    Discharge Plan: Return to the [facility] with [facility] RN/PT    Discharge Needs Assessment:  Needs      Most Recent Value   # of Referrals Placed by CTS  Communication hand-offs to next level of Care Providers        Follow-up plan:  No future appointments.    Any outstanding tests or procedures:        Referrals     Future Labs/Procedures    Home care nursing referral     Comments:    RN skilled nursing visit. RN to assess vital signs and weight, respiratory and cardiac status and patients ability to take and record daily blood pressure, temp and weight.    Your provider has ordered home care nursing services. If you have not been contacted within 2 days of your discharge please call the inpatient department phone number at [phone] .    Home Care PT Referral for Hospital Discharge     Comments:    PT to eval and treat    Your provider has ordered home care - physical therapy. If you have not been contacted within 2 days of your discharge please call the department phone number listed on the top of this document.              Key Recommendations:  FYI pt readmission:  CTS following for readmission with average EMI score. Pt was admitted from - for ESBL UTI and  is now again readmitted with UTI, GIB. She lives at the [facility] where she receives no services. She was discharged home last hospitalization with bactrim and [facility] RN/PT. She is currently being followed by ID and treated with IV abx. She is also being followed by GI for suspected lower GIB. She is cdiff positive and has been started on PO vanco. Per MD note, she is expected to discharge back to her ILF once her bleeding is resolve and hgb stable.     She was dc'd again with [facility] RN/PT orders. Her new medication is Vancomycin PO. She should stop taking advil and ASA. She is recommended to follow up in 1 week with CBC.     Alberta Rojas    AVS/Discharge Summary is the source of truth; this is a helpful guide for improved communication of patient story

## 2019-06-18 ENCOUNTER — TELEPHONE (OUTPATIENT)
Dept: FAMILY MEDICINE | Facility: CLINIC | Age: 84
End: 2019-06-18
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
## 2019-06-18 RX ORDER — OXYCODONE HYDROCHLORIDE 5 MG/1
5 TABLET ORAL EVERY 6 HOURS PRN
Qty: 8 TABLET | Refills: 0 | Status: ON HOLD | OUTPATIENT
Start: 2019-06-18 | End: 2019-06-22

## 2019-06-18 NOTE — TELEPHONE ENCOUNTER
Mimi calling from  Home Care  [phone]    Pt previously had home care, need to resume home care services  SNV 1x/wk 1 wk, 2x/wk 2 wk, 1x/wk 1 wk, 3 prns  PT eval    Home care services approved per protocol    Pt is also asking about oxycodone  Discharge summary states to continue Tylenol #3 and Oxycodone  Pt states she does not like the Tylenol #3, but wants refill on Oxycodone, was not sent home with either    Discharge note -Chronic pain: History of hip fracture.  Chronic sciatica pain.  Continue PTA Tylenol 3 every 8 hours as needed along with gabapentin 800 mg twice daily and 1600 mg at bedtime.    I only see the Oxycodone was given as in pt    Route to provider to review and advise    Eleni Travis RN Nurse Triage

## 2019-06-18 NOTE — DISCHARGE SUMMARY
[facility]  Hospitalist Discharge Summary       Date of Admission:  6/10/2019  Date of Discharge:  6/16/2019  2:23 PM  Discharging Provider: Margarito Reina MD      Discharge Diagnoses   ESBL UTI  C. diff colitis.  Acute GI bleeding  HTN  Lactic academia  Chronic pain  Anxiety    Follow-ups Needed After Discharge   Follow-up Appointments     Follow-up and recommended labs and tests       Follow up with primary care provider, Marcy Soares, within 7 days   for hospital follow- up.  Check CBC 1 week, result to PCP           Unresulted Labs Ordered in the Past 30 Days of this Admission     No orders found from 4/11/2019 to 6/11/2019.          Discharge Disposition   Discharged to home  Condition at discharge: Stable    Hospital Course   Daya Ignacio is a 88 year old female with past medical history HTN, HLD, vision loss, anxiety, chronic pain, and recent admission from 5/26 through 5/31 here for ESBL UTI sent home on Bactrim who was admitted on 6/10/2019  after 24 hours of malaise, reported fevers at home, return of dysuria and urinary frequency consistent with ongoing UTI.  Urinalysis with gross pyuria.  Started on meropenem for suspected ESBL UTI.  Afebrile, vital signs stable, no leukocytosis.  Lactic acid was elevated at 2.8 that resolved with IV fluid bolus.       Problem List/Assessment and Plan:   1. ESBL UTI: Hospitalized here from 5/26 through 5/31 for ESBL E. coli UTI. -Urine culture here again on 6/10 grew out ESBL E. Coli, now resistant to bactrim as well.she was on m IV meropenem for 5 days and stopped per ID recommendations.     2. Acute blood loss anemia due to Acute GI bleed:  Initially patient had episode of darker emesis and  has since had  multiple episodes of dark red bloody liquid stools concerning for acute GI bleed. There was no significant bloody stools over the past 2 days and hemoglobin is remained is 805 today, fairly stable. Transfused 1 unit of packed red blood  cells on 6/12. She does take an 81 mg aspirin daily and uses ibuprofen approximately 3 times per week.  Both were discontinued.EGD showed no source of bleeding on 6/11. Lower source seems likely with a differential diagnosis including diverticular bleed versus infectious colitis versus ischemic colitis.  Tolerated advance diet to low fiber today. If no further significant bleeding advance to regular diet tomorrow. We stopped Protonix since there is no evidence of upper GI bleed, Initially she declined colonoscopy.  Per gastroenterology there is no role for colonoscopy at this point, she will have following with GI as needed. There was no further bleeding and remained stable and discharged.    3. C. difficile colitis: C.dif pcr positive, started oral vancomycin 125mg qid po on 6/11 and will continue for 14 days.     4. Elevated lactic acid: Lactic acid elevated to 2.8 in setting of infection.  Not acidotic or hypotensive.  Improved to 1.7 with IV fluids.     5. HTN: BP remained on the higher side this morning. Continue PTA amlodipine 10 mg daily, Imdur 10 mg daily, metoprolol  mg daily lisinopril 40 g daily, clonidine 0.1 mg twice daily with hold parameters for low blood pressure.      6. Chronic pain: History of hip fracture.  Chronic sciatica pain.  Continue PTA Tylenol 3 every 8 hours as needed along with gabapentin 800 mg twice daily and 1600 mg at bedtime.     7. Anxiety: Continue PTA sertraline.     I discussed with patient and also her son at bedside at length the plan of discharge and follow up.    Consultations This Hospital Stay   INFECTIOUS DISEASES IP CONSULT  GASTROENTEROLOGY IP CONSULT  CARE COORDINATOR IP CONSULT    Code Status   Full Code    Time Spent on this Encounter   I, Margarito Reina, personally saw the patient today and spent greater than 30 minutes discharging this patient.       Margarito Reina MD  [facility]  "Hospital  ______________________________________________________________________    Physical Exam   Vital Signs:                  Vital signs:                    Height: 154.9 cm (5' 1\") Weight: 57.5 kg (126 lb 11.2 oz)  Estimated body mass index is 23.94 kg/m  as calculated from the following:    Height as of this encounter: 1.549 m (5' 1\").    Weight as of this encounter: 57.5 kg (126 lb 11.2 oz).    Weight: 126 lbs 11.2 oz    General:  Alert, calm, NAD  CV: regular rate and rhythm, no murmurs or rubs  Lungs:  Clear to ascultation bilaterally, normal respiratory effort  Abdomen:  Soft, nontender, nondistended, no masses, normal bowel sounds  Extremities:  No edema  Psychiatric:  Mood and affect within normal limits      Primary Care Physician   Marcy Soares    Discharge Orders      Home Care PT Referral for Hospital Discharge      Home care nursing referral      Reason for your hospital stay    ESBL UTI, C. diff     Activity    Your activity upon discharge: activity as tolerated     Follow-up and recommended labs and tests     Follow up with primary care provider, Marcy Soares, within 7 days for hospital follow- up.  Check CBC 1 week, result to PCP     Full Code     Diet    Follow this diet upon discharge: Orders Placed This Encounter  Regular diet.       Significant Results and Procedures   Most Recent 3 CBC's:  Recent Labs   Lab Test 06/16/19  0652 06/15/19  0652 06/14/19  1108 06/13/19  1221 06/13/19  0642   WBC  --  4.1  --  6.1 4.7   HGB 8.3* 8.5* 9.2* 8.6* 9.5*   MCV  --  93  --  93 93   PLT  --  141*  --  139* 141*     Most Recent 3 BMP's:  Recent Labs   Lab Test 06/16/19  0652 06/14/19  1741 06/14/19  1108 06/13/19  0642 06/12/19  0635     --   --  141 140   POTASSIUM 3.8 3.7 3.2* 3.3* 3.7   CHLORIDE 105  --   --  109 111*   CO2 33*  --   --  27 25   BUN 14  --   --  11 23   CR 0.58  --   --  0.50* 0.65   ANIONGAP 3  --   --  5 4   DAVE 8.7  --   --  8.3* 8.1*   GLC 91  --   --  78 85 "     Most Recent 6 Bacteria Isolates From Any Culture (See EPIC Reports for Culture Details):  Recent Labs   Lab Test 06/10/19  2206 06/10/19  2155 06/10/19  1954 05/26/19  1820 05/26/19  1813 05/26/19  1703   CULT No growth No growth >100,000 colonies/mL  Escherichia coli ESBL  *  Enterobacteriaceae that are susceptible to meropenem are usually susceptible to ertapenem.  ESBL (extended beta lactamase) producing organisms require contact precautions. No growth No growth 50,000 to 100,000 colonies/mL  Escherichia coli ESBL  *  Critical Value/Significant Value called to and read back by  VEE DUNLAP RN ([email or web address]).  05.28.19 2112 GJS    Enterobacteriaceae that are susceptible to meropenem are usually susceptible to ertapenem.  ESBL (extended beta lactamase) producing organisms require contact precautions.       Discharge Medications   Discharge Medication List as of 6/16/2019  2:05 PM      START taking these medications    Details   vancomycin (VANCOCIN) 125 MG capsule Take 1 capsule (125 mg) by mouth 4 times daily for 14 days, Disp-56 capsule, R-0, E-Prescribe         CONTINUE these medications which have NOT CHANGED    Details   acetaminophen-codeine (TYLENOL #3) 300-30 MG tablet TAKE ONE TABLET BY MOUTH EVERY 8 HOURS AS NEEDED FOR SEVERE PAIN    Hold this medication while on scheduled tylenol and as needed oxycodone, Disp-270 tablet, R-0, No Print Out      amLODIPine (NORVASC) 10 MG tablet Take 1 tablet (10 mg) by mouth daily, Disp-90 tablet, R-1, E-Prescribe      Calcium Carbonate (CALCIUM 600 PO) Take 1 tablet by mouth 2 times daily , Historical      Cholecalciferol (VITAMIN D3) 2000 UNITS CAPS Take 2,000 Units by mouth daily (with dinner), Historical      cloNIDine (CATAPRES) 0.1 MG tablet TAKE ONE TABLET BY MOUTH TWO TIMES A DAY, Disp-180 tablet, R-0, E-Prescribe      estradiol (ESTRACE) 0.1 MG/GM vaginal cream Place 2 g vaginally three times a week paraben-freeDisp-6 g, [id number]      !! gabapentin  (NEURONTIN) 800 MG tablet Take 800 mg by mouth 2 times daily In AM and afternoon, Historical      !! gabapentin (NEURONTIN) 800 MG tablet Take 1,600 mg by mouth At Bedtime, Historical      isosorbide dinitrate (ISORDIL) 10 MG tablet Take 1 tablet (10 mg) by mouth 3 times daily, Disp-270 tablet, R-3, E-Prescribe      lisinopril (PRINIVIL/ZESTRIL) 40 MG tablet Take 1 tablet (40 mg) by mouth daily, Disp-90 tablet, R-1, E-Prescribe      metoprolol succinate ER (TOPROL-XL) 100 MG 24 hr tablet Take 1 tablet (100 mg) by mouth daily, Disp-90 tablet, R-1, E-Prescribe      Multiple Vitamins-Minerals (PRESERVISION AREDS) CAPS Take 1 capsule by mouth 2 times daily, Historical      oxyCODONE (ROXICODONE) 5 MG tablet Take 1 tablet (5 mg) by mouth every 6 hours as needed for moderate to severe pain, Disp-8 tablet, R-0, Local Print      sertraline (ZOLOFT) 100 MG tablet Take 1 tablet (100 mg) by mouth daily, Disp-90 tablet, R-1, E-Prescribe      vitamin C (ASCORBIC ACID) 500 MG tablet Take 500 mg by mouth daily, Historical       !! - Potential duplicate medications found. Please discuss with provider.      STOP taking these medications       aspirin EC 81 MG EC tablet Comments:   Reason for Stopping:         ibuprofen (ADVIL) 200 MG tablet Comments:   Reason for Stopping:             Allergies   Allergies   Allergen Reactions     Atorvastatin Muscle Pain (Myalgia)     Macrobid [Nitrofurantoin Anhydrous] Other (See Comments)     Body aches     Sulfa Drugs

## 2019-06-18 NOTE — TELEPHONE ENCOUNTER
Rx sent for #8.    Pt will need hospital f/u soon--next 7-10 days. Recommend MD/DO evaluation.    Marcy Soares PA-C

## 2019-06-19 NOTE — TELEPHONE ENCOUNTER
"ED / Discharge Outreach Protocol    Patient Contact    Attempt # 1    Was call answered?  Yes.  \"May I please speak with Daya\"  Is patient available?   Yes    Hospital/TCU/ED for chronic condition Discharge Protocol    \"Hi, my name is Renee Herrera, a registered nurse, and I am calling from [facility].  I am calling to follow up and see how things are going for you after your recent emergency visit/hospital/TCU stay.\"    Tell me how you are doing now that you are home?\" Doing well since arriving home. Is still having some back pain, however the oxycodone is helping. The Pt denies a prior hx of back pain previous to her hospital visit. Pain is in the lower back, near tailbone. Taking antibiotics as advised. Having some nausea but it is tolerable. No reported ongoing UTI symptoms.       Discharge Instructions    \"Let's review your discharge instructions.  What is/are the follow-up recommendations?  Pt. Response: Follow up with PCP, take antibiotics as prescribed.     \"Has an appointment with your primary care provider been scheduled?\"   Yes. (confirm) -- transferred the Pt to TC's to schedule visit.     \"When you see the provider, I would recommend that you bring your medications with you.\"    Medications    \"Tell me what changed about your medicines when you discharged?\"    Changes to chronic meds?    0-1    \"What questions do you have about your medications?\"    None     New diagnoses of heart failure, COPD, diabetes, or MI?    No              Medication reconciliation completed? Yes  Was MTM referral placed (*Make sure to put transitions as reason for referral)?   No    Call Summary    \"What questions or concerns do you have about your recent visit and your follow-up care?\"     none    \"If you have questions or things don't continue to improve, we encourage you contact us through the main clinic number (give number).  Even if the clinic is not open, triage nurses are available 24/7 to help you.     We " "would like you to know that our clinic has extended hours (provide information).  We also have urgent care (provide details on closest location and hours/contact info)\"      \"Thank you for your time and take care!\"    Renee Herrera RN -- [facility]                    "

## 2019-06-21 ENCOUNTER — TELEPHONE (OUTPATIENT)
Dept: FAMILY MEDICINE | Facility: CLINIC | Age: 84
End: 2019-06-21

## 2019-06-21 ENCOUNTER — DOCUMENTATION ONLY (OUTPATIENT)
Dept: CARE COORDINATION | Facility: CLINIC | Age: 84
End: 2019-06-21

## 2019-06-21 DIAGNOSIS — A04.72 C. DIFFICILE COLITIS: Primary | ICD-10-CM

## 2019-06-21 NOTE — PROGRESS NOTES
[facility] now requests orders and shares plan of care/discharge summaries for some patients through [employer].  Please REPLY TO THIS MESSAGE OR ROUTE BACK TO THE AUTHOR in order to give authorization for orders when needed.  This is considered a verbal order, you will still receive a faxed copy of orders for signature.  Thank you for your assistance in improving collaboration for our patients.    ORDER    Discharge paperwork requests a CBC be completed on 6/24. Requesting order for home care to complete CBC on 6/24.     Thank you,   Isabel Cramer, RN  [phone]

## 2019-06-21 NOTE — TELEPHONE ENCOUNTER
Received 5 page fax for Home Health Certification and Plan of Care for Dr Omer to compete.    Form in the in-basket at [facility] in AA's folder.

## 2019-06-22 ENCOUNTER — HOSPITAL ENCOUNTER (OUTPATIENT)
Facility: CLINIC | Age: 84
Setting detail: OBSERVATION
Discharge: HOME | End: 2019-06-23
Attending: EMERGENCY MEDICINE | Admitting: INTERNAL MEDICINE
Payer: MEDICARE

## 2019-06-22 ENCOUNTER — APPOINTMENT (OUTPATIENT)
Dept: GENERAL RADIOLOGY | Facility: CLINIC | Age: 84
End: 2019-06-22
Attending: EMERGENCY MEDICINE
Payer: MEDICARE

## 2019-06-22 DIAGNOSIS — M62.81 GENERALIZED MUSCLE WEAKNESS: ICD-10-CM

## 2019-06-22 DIAGNOSIS — R19.7 DIARRHEA, UNSPECIFIED TYPE: ICD-10-CM

## 2019-06-22 DIAGNOSIS — M54.9 BACK PAIN, UNSPECIFIED BACK LOCATION, UNSPECIFIED BACK PAIN LATERALITY, UNSPECIFIED CHRONICITY: Primary | ICD-10-CM

## 2019-06-22 DIAGNOSIS — S32.020A CLOSED COMPRESSION FRACTURE OF L2 LUMBAR VERTEBRA, INITIAL ENCOUNTER (H): ICD-10-CM

## 2019-06-22 PROBLEM — A04.72 CLOSTRIDIUM DIFFICILE COLITIS: Status: ACTIVE | Noted: 2019-06-01

## 2019-06-22 PROBLEM — B96.89 URINARY TRACT INFECTION DUE TO EXTENDED-SPECTRUM BETA LACTAMASE (ESBL)-PRODUCING KLEBSIELLA: Status: ACTIVE | Noted: 2019-05-01

## 2019-06-22 PROBLEM — N39.0 URINARY TRACT INFECTION DUE TO EXTENDED-SPECTRUM BETA LACTAMASE (ESBL)-PRODUCING KLEBSIELLA: Status: ACTIVE | Noted: 2019-05-01

## 2019-06-22 PROBLEM — K92.2 UPPER GI BLEED: Status: ACTIVE | Noted: 2019-06-01

## 2019-06-22 LAB
ABO + RH BLD: NORMAL
ABO + RH BLD: NORMAL
ANION GAP SERPL CALCULATED.3IONS-SCNC: 6 MMOL/L (ref 3–14)
BASOPHILS # BLD AUTO: 0 10E9/L (ref 0–0.2)
BASOPHILS NFR BLD AUTO: 0.4 %
BLD GP AB SCN SERPL QL: NORMAL
BLOOD BANK CMNT PATIENT-IMP: NORMAL
BUN SERPL-MCNC: 10 MG/DL (ref 7–30)
CALCIUM SERPL-MCNC: 8.7 MG/DL (ref 8.5–10.1)
CHLORIDE SERPL-SCNC: 105 MMOL/L (ref 94–109)
CO2 SERPL-SCNC: 26 MMOL/L (ref 20–32)
CREAT SERPL-MCNC: 0.71 MG/DL (ref 0.52–1.04)
DIFFERENTIAL METHOD BLD: ABNORMAL
EOSINOPHIL # BLD AUTO: 0.1 10E9/L (ref 0–0.7)
EOSINOPHIL NFR BLD AUTO: 2.8 %
ERYTHROCYTE [DISTWIDTH] IN BLOOD BY AUTOMATED COUNT: 16.2 % (ref 10–15)
GFR SERPL CREATININE-BSD FRML MDRD: 76 ML/MIN/{1.73_M2}
GLUCOSE SERPL-MCNC: 99 MG/DL (ref 70–99)
HCT VFR BLD AUTO: 31.6 % (ref 35–47)
HGB BLD-MCNC: 9.8 G/DL (ref 11.7–15.7)
IMM GRANULOCYTES # BLD: 0.1 10E9/L (ref 0–0.4)
IMM GRANULOCYTES NFR BLD: 1.1 %
LYMPHOCYTES # BLD AUTO: 0.6 10E9/L (ref 0.8–5.3)
LYMPHOCYTES NFR BLD AUTO: 12.8 %
MCH RBC QN AUTO: 30.1 PG (ref 26.5–33)
MCHC RBC AUTO-ENTMCNC: 31 G/DL (ref 31.5–36.5)
MCV RBC AUTO: 97 FL (ref 78–100)
MONOCYTES # BLD AUTO: 0.3 10E9/L (ref 0–1.3)
MONOCYTES NFR BLD AUTO: 6.9 %
NEUTROPHILS # BLD AUTO: 3.5 10E9/L (ref 1.6–8.3)
NEUTROPHILS NFR BLD AUTO: 76 %
NRBC # BLD AUTO: 0 10*3/UL
NRBC BLD AUTO-RTO: 0 /100
PLATELET # BLD AUTO: 196 10E9/L (ref 150–450)
POTASSIUM SERPL-SCNC: 3.8 MMOL/L (ref 3.4–5.3)
RBC # BLD AUTO: 3.26 10E12/L (ref 3.8–5.2)
SODIUM SERPL-SCNC: 137 MMOL/L (ref 133–144)
SPECIMEN EXP DATE BLD: NORMAL
WBC # BLD AUTO: 4.6 10E9/L (ref 4–11)

## 2019-06-22 PROCEDURE — 80048 BASIC METABOLIC PNL TOTAL CA: CPT | Performed by: EMERGENCY MEDICINE

## 2019-06-22 PROCEDURE — 96374 THER/PROPH/DIAG INJ IV PUSH: CPT

## 2019-06-22 PROCEDURE — 99285 EMERGENCY DEPT VISIT HI MDM: CPT | Mod: 25

## 2019-06-22 PROCEDURE — G0378 HOSPITAL OBSERVATION PER HR: HCPCS

## 2019-06-22 PROCEDURE — 86901 BLOOD TYPING SEROLOGIC RH(D): CPT | Performed by: EMERGENCY MEDICINE

## 2019-06-22 PROCEDURE — 72100 X-RAY EXAM L-S SPINE 2/3 VWS: CPT

## 2019-06-22 PROCEDURE — 99207 ZZC MOONLIGHTING INDICATOR: CPT | Performed by: PHYSICIAN ASSISTANT

## 2019-06-22 PROCEDURE — 99220 ZZC INITIAL OBSERVATION CARE,LEVL III: CPT | Performed by: PHYSICIAN ASSISTANT

## 2019-06-22 PROCEDURE — 25000128 H RX IP 250 OP 636: Performed by: EMERGENCY MEDICINE

## 2019-06-22 PROCEDURE — 25000132 ZZH RX MED GY IP 250 OP 250 PS 637: Mod: GY | Performed by: EMERGENCY MEDICINE

## 2019-06-22 PROCEDURE — 86900 BLOOD TYPING SEROLOGIC ABO: CPT | Performed by: EMERGENCY MEDICINE

## 2019-06-22 PROCEDURE — 85025 COMPLETE CBC W/AUTO DIFF WBC: CPT | Performed by: EMERGENCY MEDICINE

## 2019-06-22 PROCEDURE — 25000132 ZZH RX MED GY IP 250 OP 250 PS 637: Mod: GY | Performed by: PHYSICIAN ASSISTANT

## 2019-06-22 PROCEDURE — 86850 RBC ANTIBODY SCREEN: CPT | Performed by: EMERGENCY MEDICINE

## 2019-06-22 RX ORDER — LISINOPRIL 40 MG/1
40 TABLET ORAL DAILY
Status: DISCONTINUED | OUTPATIENT
Start: 2019-06-22 | End: 2019-06-23 | Stop reason: HOSPADM

## 2019-06-22 RX ORDER — ONDANSETRON 4 MG/1
4 TABLET, ORALLY DISINTEGRATING ORAL EVERY 6 HOURS PRN
Status: DISCONTINUED | OUTPATIENT
Start: 2019-06-22 | End: 2019-06-23 | Stop reason: HOSPADM

## 2019-06-22 RX ORDER — ACETAMINOPHEN 500 MG
1000 TABLET ORAL PRN
COMMUNITY
End: 2019-07-04

## 2019-06-22 RX ORDER — ACETAMINOPHEN 500 MG
1000 TABLET ORAL 3 TIMES DAILY
Status: DISCONTINUED | OUTPATIENT
Start: 2019-06-22 | End: 2019-06-23 | Stop reason: HOSPADM

## 2019-06-22 RX ORDER — PROCHLORPERAZINE 25 MG
12.5 SUPPOSITORY, RECTAL RECTAL EVERY 12 HOURS PRN
Status: DISCONTINUED | OUTPATIENT
Start: 2019-06-22 | End: 2019-06-23 | Stop reason: HOSPADM

## 2019-06-22 RX ORDER — PROCHLORPERAZINE MALEATE 5 MG
5 TABLET ORAL EVERY 6 HOURS PRN
Status: DISCONTINUED | OUTPATIENT
Start: 2019-06-22 | End: 2019-06-23 | Stop reason: HOSPADM

## 2019-06-22 RX ORDER — LIDOCAINE 4 G/G
1-2 PATCH TOPICAL
Status: DISCONTINUED | OUTPATIENT
Start: 2019-06-22 | End: 2019-06-23 | Stop reason: HOSPADM

## 2019-06-22 RX ORDER — ONDANSETRON 2 MG/ML
4 INJECTION INTRAMUSCULAR; INTRAVENOUS EVERY 6 HOURS PRN
Status: DISCONTINUED | OUTPATIENT
Start: 2019-06-22 | End: 2019-06-23 | Stop reason: HOSPADM

## 2019-06-22 RX ORDER — ACETAMINOPHEN 650 MG/1
650 SUPPOSITORY RECTAL EVERY 4 HOURS PRN
Status: DISCONTINUED | OUTPATIENT
Start: 2019-06-22 | End: 2019-06-23 | Stop reason: HOSPADM

## 2019-06-22 RX ORDER — SERTRALINE HYDROCHLORIDE 100 MG/1
100 TABLET, FILM COATED ORAL DAILY
Status: DISCONTINUED | OUTPATIENT
Start: 2019-06-22 | End: 2019-06-23 | Stop reason: HOSPADM

## 2019-06-22 RX ORDER — ISOSORBIDE DINITRATE 10 MG/1
10 TABLET ORAL 3 TIMES DAILY
Status: DISCONTINUED | OUTPATIENT
Start: 2019-06-22 | End: 2019-06-23 | Stop reason: HOSPADM

## 2019-06-22 RX ORDER — VANCOMYCIN HYDROCHLORIDE 50 MG/ML
125 KIT ORAL 4 TIMES DAILY
Status: DISCONTINUED | OUTPATIENT
Start: 2019-06-22 | End: 2019-06-23 | Stop reason: HOSPADM

## 2019-06-22 RX ORDER — GABAPENTIN 400 MG/1
1600 CAPSULE ORAL AT BEDTIME
Status: DISCONTINUED | OUTPATIENT
Start: 2019-06-22 | End: 2019-06-23 | Stop reason: HOSPADM

## 2019-06-22 RX ORDER — HYDROCODONE BITARTRATE AND ACETAMINOPHEN 5; 325 MG/1; MG/1
1 TABLET ORAL ONCE
Status: COMPLETED | OUTPATIENT
Start: 2019-06-22 | End: 2019-06-22

## 2019-06-22 RX ORDER — AMLODIPINE BESYLATE 10 MG/1
10 TABLET ORAL DAILY
Status: DISCONTINUED | OUTPATIENT
Start: 2019-06-22 | End: 2019-06-23 | Stop reason: HOSPADM

## 2019-06-22 RX ORDER — METOPROLOL SUCCINATE 100 MG/1
100 TABLET, EXTENDED RELEASE ORAL DAILY
Status: DISCONTINUED | OUTPATIENT
Start: 2019-06-22 | End: 2019-06-23 | Stop reason: HOSPADM

## 2019-06-22 RX ORDER — ACETAMINOPHEN 325 MG/1
650 TABLET ORAL EVERY 4 HOURS PRN
Status: DISCONTINUED | OUTPATIENT
Start: 2019-06-22 | End: 2019-06-23 | Stop reason: HOSPADM

## 2019-06-22 RX ORDER — TRAMADOL HYDROCHLORIDE 50 MG/1
50 TABLET ORAL EVERY 6 HOURS PRN
Status: DISCONTINUED | OUTPATIENT
Start: 2019-06-22 | End: 2019-06-23 | Stop reason: HOSPADM

## 2019-06-22 RX ORDER — ACETAMINOPHEN 325 MG/1
650 TABLET ORAL ONCE
Status: COMPLETED | OUTPATIENT
Start: 2019-06-22 | End: 2019-06-22

## 2019-06-22 RX ORDER — CLONIDINE HYDROCHLORIDE 0.1 MG/1
0.1 TABLET ORAL 2 TIMES DAILY
Status: DISCONTINUED | OUTPATIENT
Start: 2019-06-22 | End: 2019-06-23 | Stop reason: HOSPADM

## 2019-06-22 RX ORDER — ONDANSETRON 2 MG/ML
4 INJECTION INTRAMUSCULAR; INTRAVENOUS ONCE
Status: COMPLETED | OUTPATIENT
Start: 2019-06-22 | End: 2019-06-22

## 2019-06-22 RX ORDER — NALOXONE HYDROCHLORIDE 0.4 MG/ML
.1-.4 INJECTION, SOLUTION INTRAMUSCULAR; INTRAVENOUS; SUBCUTANEOUS
Status: DISCONTINUED | OUTPATIENT
Start: 2019-06-22 | End: 2019-06-23 | Stop reason: HOSPADM

## 2019-06-22 RX ORDER — GABAPENTIN 400 MG/1
800 CAPSULE ORAL 2 TIMES DAILY
Status: DISCONTINUED | OUTPATIENT
Start: 2019-06-22 | End: 2019-06-23 | Stop reason: HOSPADM

## 2019-06-22 RX ADMIN — TRAMADOL HYDROCHLORIDE 50 MG: 50 TABLET, COATED ORAL at 23:30

## 2019-06-22 RX ADMIN — ACETAMINOPHEN 1000 MG: 500 TABLET, FILM COATED ORAL at 19:51

## 2019-06-22 RX ADMIN — TRAMADOL HYDROCHLORIDE 50 MG: 50 TABLET, COATED ORAL at 17:39

## 2019-06-22 RX ADMIN — METOPROLOL SUCCINATE 100 MG: 100 TABLET, EXTENDED RELEASE ORAL at 17:40

## 2019-06-22 RX ADMIN — ACETAMINOPHEN 1000 MG: 500 TABLET, FILM COATED ORAL at 17:40

## 2019-06-22 RX ADMIN — VANCOMYCIN HYDROCHLORIDE 125 MG: KIT at 19:52

## 2019-06-22 RX ADMIN — ISOSORBIDE DINITRATE 10 MG: 10 TABLET ORAL at 19:51

## 2019-06-22 RX ADMIN — OXYCODONE HYDROCHLORIDE 2.5 MG: 5 TABLET ORAL at 19:51

## 2019-06-22 RX ADMIN — VANCOMYCIN HYDROCHLORIDE 125 MG: KIT at 17:39

## 2019-06-22 RX ADMIN — GABAPENTIN 800 MG: 400 CAPSULE ORAL at 17:39

## 2019-06-22 RX ADMIN — SERTRALINE HYDROCHLORIDE 100 MG: 100 TABLET ORAL at 17:40

## 2019-06-22 RX ADMIN — ACETAMINOPHEN 650 MG: 325 TABLET, FILM COATED ORAL at 11:15

## 2019-06-22 RX ADMIN — ISOSORBIDE DINITRATE 10 MG: 10 TABLET ORAL at 17:39

## 2019-06-22 RX ADMIN — AMLODIPINE BESYLATE 10 MG: 10 TABLET ORAL at 17:40

## 2019-06-22 RX ADMIN — ONDANSETRON HYDROCHLORIDE 4 MG: 2 INJECTION, SOLUTION INTRAMUSCULAR; INTRAVENOUS at 12:40

## 2019-06-22 RX ADMIN — HYDROCODONE BITARTRATE AND ACETAMINOPHEN 1 TABLET: 5; 325 TABLET ORAL at 14:16

## 2019-06-22 RX ADMIN — GABAPENTIN 1600 MG: 400 CAPSULE ORAL at 23:29

## 2019-06-22 RX ADMIN — CLONIDINE HYDROCHLORIDE 0.1 MG: 0.1 TABLET ORAL at 19:51

## 2019-06-22 RX ADMIN — LISINOPRIL 40 MG: 40 TABLET ORAL at 17:39

## 2019-06-22 ASSESSMENT — ENCOUNTER SYMPTOMS
DIARRHEA: 1
FEVER: 0
BACK PAIN: 1
ABDOMINAL PAIN: 0

## 2019-06-22 NOTE — ED NOTES
Bed: ED32  Expected date: 6/22/19  Expected time: 9:55 AM  Means of arrival: Ambulance  Comments:  calvin 592- 88y, F, N/D, weakness, VSS

## 2019-06-22 NOTE — PHARMACY-ADMISSION MEDICATION HISTORY
Admission medication history interview status for this patient is complete. See [facility] admission navigator for allergy information, prior to admission medications and immunization status.     Medication history interview source(s):Patient  Medication history resources (including written lists, pill bottles, clinic record):None  Primary pharmacy: [facility]    Changes made to PTA medication list:  Added:  Tylenol  Deleted:  Tyleno #3, Oxycodone  Changed:  None    Actions taken by pharmacist (provider contacted, etc):None     Additional medication history information:   - Pt uses an OTC eye drops for macular degeneration but she was not able to remember name.  - Pt uses Estrace vaginal crean on MWF.  - Pt was started on oral Vanco on Mon 6/17/19.    Medication reconciliation/reorder completed by provider prior to medication history? Yes    Do you take OTC medications (eg tylenol, ibuprofen, fish oil, eye/ear drops, etc)?  Yes, as listed.     For patients on insulin therapy:  No     Prior to Admission medications    Medication Sig Last Dose Taking? Auth Provider   acetaminophen (TYLENOL) 500 MG tablet Take 1,000 mg by mouth as needed for mild pain prn Yes Unknown, Entered By History   amLODIPine (NORVASC) 10 MG tablet Take 1 tablet (10 mg) by mouth daily 6/21/2019 at Unknown time Yes Marcy Soares PA-C   Calcium Carbonate (CALCIUM 600 PO) Take 1 tablet by mouth 2 times daily  6/21/2019 at pm Yes Reported, Patient   Cholecalciferol (VITAMIN D3) 2000 UNITS CAPS Take 2,000 Units by mouth daily (with dinner) 6/21/2019 at Unknown time Yes Unknown, Entered By History   cloNIDine (CATAPRES) 0.1 MG tablet TAKE ONE TABLET BY MOUTH TWO TIMES A DAY 6/21/2019 at pm Yes Marcy Soares PA-C   estradiol (ESTRACE) 0.1 MG/GM vaginal cream Place 2 g vaginally three times a week paraben-free 6/19/2019 at Unknown time Yes Marcy Soares PA-C   gabapentin (NEURONTIN) 800 MG tablet Take 800 mg by mouth 2 times daily In AM  and afternoon 6/21/2019 at afternoon Yes Reported, Patient   gabapentin (NEURONTIN) 800 MG tablet Take 1,600 mg by mouth At Bedtime 6/21/2019 at HS Yes Reported, Patient   isosorbide dinitrate (ISORDIL) 10 MG tablet Take 1 tablet (10 mg) by mouth 3 times daily 6/21/2019 at pm Yes Marcy Soares PA-C   lisinopril (PRINIVIL/ZESTRIL) 40 MG tablet Take 1 tablet (40 mg) by mouth daily 6/21/2019 at Unknown time Yes Marcy Soares PA-C   metoprolol succinate ER (TOPROL-XL) 100 MG 24 hr tablet Take 1 tablet (100 mg) by mouth daily 6/21/2019 at Unknown time Yes Marcy Soares PA-C   Multiple Vitamins-Minerals (PRESERVISION AREDS) CAPS Take 1 capsule by mouth 2 times daily 6/21/2019 at pm Yes Unknown, Entered By History   sertraline (ZOLOFT) 100 MG tablet Take 1 tablet (100 mg) by mouth daily 6/21/2019 at Unknown time Yes Marcy Soares PA-C   vancomycin (VANCOCIN) 125 MG capsule Take 1 capsule (125 mg) by mouth 4 times daily for 14 days 6/21/2019 at pm Yes Margarito Reina MD   vitamin C (ASCORBIC ACID) 500 MG tablet Take 500 mg by mouth daily 6/21/2019 at Unknown time Yes Reported, Patient

## 2019-06-22 NOTE — ED NOTES
Patient used bedside commode, had small amount of diarrhea stool in her adult diaper, also voided and had small BM on the commode.  Patient expresses concern that she will not be able to care for herself at home due to back pain and having frequent diarrhea at home today.

## 2019-06-22 NOTE — ED NOTES
.  [facility]  ED Nurse Handoff Report    Daya Ignacio is a 88 year old female   ED Chief complaint: Back Pain and Diarrhea  . ED Diagnosis:   Final diagnoses:   Generalized muscle weakness   Diarrhea, unspecified type   Closed compression fracture of L2 lumbar vertebra, initial encounter (H)     Allergies:   Allergies   Allergen Reactions     Atorvastatin Muscle Pain (Myalgia)     Macrobid [Nitrofurantoin Anhydrous] Other (See Comments)     Body aches     Sulfa Drugs        Code Status: Full Code  Activity level - Baseline/Home: Independent -- uses cane sometimes. Activity Level - Current:   Assist X 1. Lift room needed: No. Bariatric: No   Needed: No   Isolation: Yes. Infection: Not Applicable  C-Diff (Clostridium Difficile) diagnosis.     Vital Signs:   Vitals:    06/22/19 1010 06/22/19 1100 06/22/19 1200 06/22/19 1300   BP: 170/68 174/72 182/72 179/75   Pulse:  62 63 70   Resp:       Temp:       TempSrc:       SpO2: 94% 94% 94% 92%   Weight:   56.7 kg (125 lb)        Cardiac Rhythm:  ,      Pain level: 0-10 Pain Scale: 8  Patient confused: No. Patient Falls Risk: Yes.   Elimination Status: Has voided   Patient Report - Initial Complaint: Back pain and diarrhea. Focused Assessment:   Patient complains of back pain and diarrhea at home.  She lives in an elderly independent living apartment.  She states she has been having increasing back pain and due to this pain and diarrhea at home does not think she will be able to care for herself.         Tests Performed: Labs. Abnormal Results:   Labs Ordered and Resulted from Time of ED Arrival Up to the Time of Departure from the ED   CBC WITH PLATELETS DIFFERENTIAL - Abnormal; Notable for the following components:       Result Value    RBC Count 3.26 (*)     Hemoglobin 9.8 (*)     Hematocrit 31.6 (*)     MCHC 31.0 (*)     RDW 16.2 (*)     Absolute Lymphocytes 0.6 (*)     All other components within normal limits   BASIC METABOLIC PANEL    PERIPHERAL IV CATHETER   ABO/RH TYPE AND SCREEN     Lumbar spine XR, 2-3 views   Final Result        .   Treatments provided: Meds.  Assistance with toileting  Family Comments: patient is here alone  OBS brochure/video discussed/provided to patient:  Yes  ED Medications:   Medications   HYDROcodone-acetaminophen (NORCO) 5-325 MG per tablet 1 tablet (has no administration in time range)   acetaminophen (TYLENOL) tablet 650 mg (650 mg Oral Given 6/22/19 1115)   ondansetron (ZOFRAN) injection 4 mg (4 mg Intravenous Given 6/22/19 1240)     Drips infusing:  No  For the majority of the shift, the patient's behavior Green. Interventions performed were rounding.     Severe Sepsis OR Septic Shock Diagnosis Present: No      ED Nurse Name/Phone Number: Shakila Rdz,   1:57 PM    RECEIVING UNIT ED HANDOFF REVIEW    Above ED Nurse Handoff Report was reviewed: Yes  Reviewed by: Bonita Alonzo on June 22, 2019 at 2:43 PM

## 2019-06-22 NOTE — LETTER
Transition Communication Hand-off for Care Transitions to Next Level of Care Provider    Name: Daya Ignacio  : 10/11/1930  MRN #: [id number]  Primary Care Provider: Marcy Soares     Primary Clinic: [address] [facility] [id number]     Reason for Hospitalization:  Generalized muscle weakness [M62.81]  Closed compression fracture of L2 lumbar vertebra, initial encounter (H) [S32.020A]  Diarrhea, unspecified type [R19.7]  Admit Date/Time: 2019 10:04 AM  Discharge Date: 19  Payor Source: Payor: MEDICARE / Plan: MEDICARE / Product Type: Medicare /     Readmission Assessment Measure (EMI) Risk Score/category: Elevated           Reason for Communication Hand-off Referral: Fragility    Discharge Plan: Home with home care       Concern for non-adherence with plan of care:   No  Discharge Needs Assessment:  Needs      Most Recent Value   Equipment Currently Used at Home  cane, straight, walker, rolling          Already enrolled in Tele-monitoring program and name of program:  NA  Follow-up specialty is recommended: No    Follow-up plan:    Future Appointments   Date Time Provider Department Center   2019 11:15 AM Marcy Soares, [facility]       Any outstanding tests or procedures:         Key Recommendations:  CTS identifies pt as high risk due to Elevated EMI. Patient was admitted on 19 for back pain. Patient has a history of chronic hip pain on neurontin, HTN treated with medications, and anxiety. Patient has had 2 hospitalizations, one in May for ESBL UTI and a fall and in  for ESBL UTI, C Diff, and GI Bleed. Patient is open to home care through [facility].  No gap in care identified but patient not managing well at home d/t ongoing pain.  Patient was evaluated by PT and patient discharged to home with resumption of home PT.   Please follow up with patient for discharge on oxycodone for pain control.     Isabel Rowland    AVS/Discharge Summary is the source of truth; this is a  helpful guide for improved communication of patient story

## 2019-06-22 NOTE — ED NOTES
Patient assisted to bedside commode to void and have a small BM.  Patient requiring assist x1 to transfer in and out of bed.  She reports that her back pain is unchanged after tylenol.

## 2019-06-22 NOTE — ED TRIAGE NOTES
Arrives via EMS for back pain, diarrhea, and weakness. Seen in this ED one week ago and is being treated for C-diff infection. Had diarrhea this morning and reports she felt weak, also reports some low back pain that started yesterday. Alert and oriented, ABCs intact.

## 2019-06-22 NOTE — PROGRESS NOTES
ROOM # [id number]    Living Situation (if not independent, order SW consult): [place]  Facility name: [facility]  : sonGael [phone]    Activity level at baseline: ind with walker  Activity level on admit: SBA with gait belt and walker      Patient registered to observation; given Patient Bill of Rights; given the opportunity to ask questions about observation status and their plan of care.  Patient has been oriented to the observation room, bathroom and call light is in place.    Discussed discharge goals and expectations with patient/family.

## 2019-06-22 NOTE — ED NOTES
Patient up to bedside commode for small BM.  Outpatient Observation discussed and patient given handout.

## 2019-06-22 NOTE — ED PROVIDER NOTES
History     Chief Complaint:  Back Pain and Diarrhea    The history is provided by the patient.      Daya Ignacio is a 88 year old female who presents with back pain and diarrhea. The patient was hospitalized with c.eduarda on 6/11. The patient states that yesterday she started experiencing low back pain, she rates the pain at 8/10. She states she did not have this pain during her hospitalization. The patient also states she started experiencing diarrhea today. She states that she is weak and tied. The patient denies any fever or abdominal pain.    Allergies:  Atorvastatin   Macrobid  Sulfa drugs    Medications:    Norvasc  Catapres  Estrace  Gabapentin  ospdril  Prinivil  Toprol  Vancocin     Past Medical History:    Hypertension  osteoporosis   Branch retinal vein occlusion right eye  NSTEMI  generalized anxiety disorder  Hyperlipidemia  sciatica    Past Surgical History:    Orthopedic- left hip     Family History:    Alzheimer's - mother  MI- father  Parkinson's - brother    Social History:  The patient was accompanied to the ED by son.  Smoking Status: Former Smoker  Smokeless Tobacco: Never Used  Alcohol Use: Positive  Marital Status:  Single      Review of Systems   Constitutional: Negative for fever.   Gastrointestinal: Positive for diarrhea. Negative for abdominal pain.   Musculoskeletal: Positive for back pain.   All other systems reviewed and are negative.      Physical Exam     Patient Vitals for the past 24 hrs:   BP Temp Temp src Pulse Resp SpO2 Weight   06/22/19 1300 179/75 -- -- 70 -- 92 % --   06/22/19 1200 182/72 -- -- 63 -- 94 % 56.7 kg (125 lb)   06/22/19 1100 174/72 -- -- 62 -- 94 % --   06/22/19 1010 170/68 -- -- -- -- 94 % --   06/22/19 1008 170/68 98.2  F (36.8  C) Oral 60 20 95 % --     Physical Exam  Constitutional: Vital signs reviewed as above.   HENT:    Head: No external signs of trauma noted.   Eyes: Conjunctivae are normal. Pupils are equal, round, and reactive to light.    Cardiovascular:    Normal rate, regular rhythm and normal heart sounds.     Exam reveals no friction rub.     No murmur heard.  Pulmonary/Chest:    Effort normal and breath sounds normal.    No respiratory distress.    There are no wheezes.    There are no rales.   Gastrointestinal:    Soft.    Bowel sounds normal.    There is no distension.    There is no tenderness.    There is no rebound or guarding.   Musculoskeletal:    Normal range of motion.    Normal Tone   There is lumbar pain  Neurological: Patient is alert and oriented to person, place, and time.   Skin: Skin is warm and dry. Patient is not diaphoretic  Psychiatric: The patient appears calm      Emergency Department Course     Imaging:  Radiology findings were communicated with the patient who voiced understanding of the findings.    Lumbar spine XR 2-3 views:  5% compression of the L2 superior endplate, new compared to the prior  chest x-ray but possibly chronic. No other findings suspicious for an  acute abnormality.    T11 and L1 vertebral plana again noted. 40% L3 superior endplate and  subtle L4 superior endplate compressions again noted. Normal height of  the L5 vertebral body. Advanced L3-4 and L4-5 degeneration. Moderate  degenerative change of the sacroiliac joints. Prosthetic right hip.  Reading per radiology    Laboratory:  Laboratory findings were communicated with the patient who voiced understanding of the findings.    CBC: WBC 4.6, HGB 9.8(L),   BMP: WNL (Creatinine 0.71)  ABO/Rh Type and screen: A, Rh negative, antibody negative    Interventions:  1115 tylenol 650 mg Oral  1240 Zofran 4 mg IV    Emergency Department Course:   Nursing notes and vitals reviewed.    1010 I performed an exam of the patient as documented above.     1021 IV was inserted and blood was drawn for laboratory testing, results above.    1128 The patient was sent for a lumbar XR while in the emergency department, results above.     1345 I personally reviewed  the imaging and laboratory results with the patient and answered all related questions prior to admit.    1400 I spoke with Dr. Padgett of the neurosurgery service regarding patient's presentation, findings, and plan of care.    1413  I spoke with Dr. Kenyon of the Hospitalist service from [facility] regarding patient's presentation, findings, and plan of care.      Impression & Plan      Medical Decision Making:  Daya Ignacio is a 88 year old female who presents to the emergency department today for evaluation of back pain and progressive weakness.  Please see the HPI and exam for specifics.  Patient remained stable in the ED.  X-ray suggested a possible acute change to the L2 vertebra.  The patient has been feeling more weak and at this time I believe will probably need transitional care facility given her hospitalization for C. difficile.  I discussed the case with the hospitalist PA and will bring the patient upstairs for further monitoring and care.    Diagnosis:    ICD-10-CM    1. Generalized muscle weakness M62.81    2. Diarrhea, unspecified type R19.7    3. Closed compression fracture of L2 lumbar vertebra, initial encounter (H) S32.020A      Disposition:   The patient is admitted into the care of Dr. Kenyon.    Scribe Disclosure:  Nuvia LYNN, am serving as a scribe at 10:13 AM on 6/22/2019 to document services personally performed by Waqas Muhammad DO based on my observations and the provider's statements to me.  [facility] EMERGENCY DEPARTMENT       Waqas Muhammad DO  06/22/19 6462

## 2019-06-22 NOTE — H&P
[facility]    History and Physical - Hospitalist Service       Date of Admission:  6/22/2019    Assessment & Plan   Daya Ignacio is a 88 year old female with hypertension, chronic hip pain, anxiety, and two recent hospital stays for ESBL UTI, acute GI bleed, and CDiff who returns to [facility] ED with back pain.    #Acute sacral pain in setting of chronic hip pain  While imaging shows new superior endplate fracture of L2, patient's pain is mainly over her sacrum/coccyx.  Suspect this may in some part be due to the pressure that has been applied to this area by her being in bed a lot over the last month.  Could also be related to her SI joint arthropathy.  No skin changes to suggest pressure ulcer.  Will try a donut or foam ring to see if this helps relieve pressure while she's in bed.  Will also schedule APAP.  Tramadol, T3, and low-dose oxy available PRN (has been on T3 and oxycodone in past).  PTA gabapentin resumed (need to be careful with narcotics as this can potentiate the effects). Abd binder ordered to see if this helps provide support when patient is up or ambulating.  Lido patches available as is Aqua K pad.  SW/CC/PT consults placed.  Patient agreeable to rehab as she is clearly failing at home and would benefit from nursing and therapy support until she is strong enough to return to senior living.      #CDiff Colitis  Continue oral vanc.  No fever, leukocytosis, or hypotension to suggest escalating antibiotics.  Enteric precautions.  Monitor.    #ESBL UTI  Appear successfully treated with meropenem.  Enteric precautions.     #Hypertension  PTA amlodipine, clonidine, Isordil, lisinopril, and Toprol resumed.      Diet: Combination Diet 2 gm NA Diet    DVT Prophylaxis: Pneumatic Compression Devices  Almanza Catheter: not present  Code Status: Full Code      Disposition Plan  Rehab hopefully tomorrow if bed available.      The patient's care was discussed with the Attending Physician,   Kostas.    MARGARITA Cat   [facility]    ______________________________________________________________________    Chief Complaint   Back pain    History is obtained from the patient and Epic    History of Present Illness   Daya Ignacio is a 88 year old female with hypertension, chronic hip pain, anxiety, and two recent hospital stays for ESBL UTI, acute GI bleed, and CDiff who returns to [facility] ED with back pain.  She states back pain was present during her last admission (6/10-6/16) but not as bad.  She also had noticed pins and needles in her bilateral legs but has worsened since her hospital stay.  She was discharged home to independent senior living with [facility] and was also checked on by an RN Case Manager by phone.  Phone note states patient reported she was doing well and pain was relieved by oxycodone, however she doesn't remember this and states she wasn't sent with oxycodone and that tylenol hasn't been enough.  She has found it difficult to get out of bed and get around the house because of pain.  She states when pain is severe, her legs feel weak but that she doesn't feel weak all the time.  Her frequent loose stools persist on oral vanc but she is only about shelter through her intended course.  She denies fever, chills, shortness of breath, chest pain, difficulty with urination, abdominal cramping, nausea, vomiting, unusual bleeding, or new rash.      In the ER, vitals stable.  BMP within normal limits.  CBC within normal limits save for hemoglobin 9.8 (improved from prior).  XR Lumbar Spine showed new 5% suprior endplate fracture of L2, stable L3 and L4 endplate compression fractures, degenerative SI joints, and a right hip replacement.  Patient felt unable to return home due to ongoing pain and difficulty at home so admission to [facility] requested for pain control and placement.     Patient was here recently from 5/26-5/31 for ESBL UTI treated with Bactrim and again 6/10-6/16 for ESBL UTI  (now resistant to Bactrim so treated with meropenem) and CDiff colitis treated with oral vanc.  She was discharged home with [facility] and family support.  Patient states her son works but will call and check on her daily.  He will come over if she asks him to, otherwise she is at home alone most of the time.  She has been relying on a walker and other assistive devices at home because she has a hard time moving around with the pain.  She has not taken any of her pills today.      Review of Systems    A comprehensive 14 point review of systems was undertaken with pertinent positives and negatives as above and otherwise unremarkable.     Past Medical History    I have reviewed this patient's medical history and updated it with pertinent information if needed.   Past Medical History:   Diagnosis Date     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 4/16/2014     Closed fracture of unspecified part of neck of femur      Closed fracture of unspecified part of vertebral column without mention of spinal cord injury     from fall summer 06     Elevated blood pressure reading without diagnosis of hypertension      Hypertension      Macular degeneration (senile) of retina, unspecified      ESBL UTI resistant to Bactrim.  Treated with meropenem.  Followed by ID Darion)    CDifficle colitis.    Chronic pain    Anxiety    GI bleed    Past Surgical History   I have reviewed this patient's surgical history and updated it with pertinent information if needed.  Past Surgical History:   Procedure Laterality Date     C NONSPECIFIC PROCEDURE      lt hip pain     ESOPHAGOSCOPY, GASTROSCOPY, DUODENOSCOPY (EGD), COMBINED N/A 6/11/2019    Procedure: ESOPHAGOGASTRODUODENOSCOPY (EGD);  Surgeon: Gaurav Degroot MD;  Location:  GI     ORTHOPEDIC SURGERY         Social History   I have reviewed this patient's social history and updated it with pertinent information if needed.  Social History     Tobacco Use     Smoking status: Never Smoker      Smokeless tobacco: Never Used   Substance Use Topics     Alcohol use: No     Drug use: No   Lives independently in senior living.  One son lives locally.     Family History   I have reviewed this patient's family history and updated it with pertinent information if needed.   Family History   Problem Relation Age of Onset     Alzheimer Disease Mother         mild     Cardiovascular Father         heart attack     Neurologic Disorder Brother 83        Parkinson's       Prior to Admission Medications   Prior to Admission Medications   Prescriptions Last Dose Informant Patient Reported? Taking?   Calcium Carbonate (CALCIUM 600 PO)   Yes No   Sig: Take 1 tablet by mouth 2 times daily    Cholecalciferol (VITAMIN D3) 2000 UNITS CAPS   Yes No   Sig: Take 2,000 Units by mouth daily (with dinner)   Multiple Vitamins-Minerals (PRESERVISION AREDS) CAPS   Yes No   Sig: Take 1 capsule by mouth 2 times daily   amLODIPine (NORVASC) 10 MG tablet   No No   Sig: Take 1 tablet (10 mg) by mouth daily   cloNIDine (CATAPRES) 0.1 MG tablet   No No   Sig: TAKE ONE TABLET BY MOUTH TWO TIMES A DAY   estradiol (ESTRACE) 0.1 MG/GM vaginal cream   No No   Sig: Place 2 g vaginally three times a week paraben-free   gabapentin (NEURONTIN) 800 MG tablet   Yes No   Sig: Take 800 mg by mouth 2 times daily In AM and afternoon   gabapentin (NEURONTIN) 800 MG tablet   Yes No   Sig: Take 1,600 mg by mouth At Bedtime   isosorbide dinitrate (ISORDIL) 10 MG tablet   No No   Sig: Take 1 tablet (10 mg) by mouth 3 times daily   lisinopril (PRINIVIL/ZESTRIL) 40 MG tablet   No No   Sig: Take 1 tablet (40 mg) by mouth daily   metoprolol succinate ER (TOPROL-XL) 100 MG 24 hr tablet   No No   Sig: Take 1 tablet (100 mg) by mouth daily   sertraline (ZOLOFT) 100 MG tablet   No No   Sig: Take 1 tablet (100 mg) by mouth daily   vancomycin (VANCOCIN) 125 MG capsule   No No   Sig: Take 1 capsule (125 mg) by mouth 4 times daily for 14 days   vitamin C (ASCORBIC ACID) 500  MG tablet   Yes No   Sig: Take 500 mg by mouth daily      Facility-Administered Medications: None     Allergies   Allergies   Allergen Reactions     Atorvastatin Muscle Pain (Myalgia)     Macrobid [Nitrofurantoin Anhydrous] Other (See Comments)     Body aches     Sulfa Drugs        Physical Exam   Vital Signs: Temp: 98.6  F (37  C) Temp src: Oral BP: 175/64 Pulse: 61   Resp: 20 SpO2: 97 % O2 Device: None (Room air)    Weight: 125 lbs 0 oz    GENERAL:  Pleasant, cooperative, alert.  Questionable historian.  HEENT: Normocephalic, atraumatic.  Extra occular mm intact.  Sclera clear. PERRL.  Mucous membranes moist.     PULMONOLOGY: Clear to auscultation bilaterally   CARDIAC: Regular rate and rhythm.     ABDOMEN: Soft, nontender non distended.    BACK:  No step offs, skin lesions, or tenderness to midline palpation.   MUSCULOSKELETAL:  Moving x 4 spontaneously with CMS intact x4.  Normal bulk and tone.  No LE edema.  Strength intact in flexors and extensors of BLE including EHL, tib ant, and gastroc.   NEURO: Alert and oriented x3.  CN II-XII grossly intact and symmetric.  No ataxia or tremor.  Nonfocal exam.    Data   Data reviewed today: I reviewed all medications, new labs and imaging results over the last 24 hours. I personally reviewed the Lumbar XR image(s) showing endplate compression fractures of L2-L4, arthritis of SI joints.    Recent Labs   Lab 06/22/19  1021 06/16/19  0652   WBC 4.6  --    HGB 9.8* 8.3*   MCV 97  --      --     141   POTASSIUM 3.8 3.8   CHLORIDE 105 105   CO2 26 33*   BUN 10 14   CR 0.71 0.58   ANIONGAP 6 3   DAVE 8.7 8.7   GLC 99 91     Recent Results (from the past 24 hour(s))   Lumbar spine XR, 2-3 views    Narrative    XR LUMBAR SPINE 2-3 VIEWS 6/22/2019 11:39 AM     COMPARISON: Chest x-ray 2 views 5/28/2017    HISTORY: Midline lumbar pain    FINDINGS:   5 lumbar type vertebral bodies.    5% compression of the L2 superior endplate, new compared to the prior  chest x-ray but  possibly chronic. No other findings suspicious for an  acute abnormality.    T11 and L1 vertebral plana again noted. 40% L3 superior endplate and  subtle L4 superior endplate compressions again noted. Normal height of  the L5 vertebral body. Advanced L3-4 and L4-5 degeneration. Moderate  degenerative change of the sacroiliac joints. Prosthetic right hip.    RICKEY COMBS MD

## 2019-06-23 ENCOUNTER — APPOINTMENT (OUTPATIENT)
Dept: PHYSICAL THERAPY | Facility: CLINIC | Age: 84
End: 2019-06-23
Attending: PHYSICIAN ASSISTANT
Payer: MEDICARE

## 2019-06-23 VITALS
SYSTOLIC BLOOD PRESSURE: 149 MMHG | HEIGHT: 61 IN | WEIGHT: 126 LBS | HEART RATE: 60 BPM | TEMPERATURE: 97.6 F | BODY MASS INDEX: 23.79 KG/M2 | DIASTOLIC BLOOD PRESSURE: 48 MMHG | RESPIRATION RATE: 18 BRPM | OXYGEN SATURATION: 96 %

## 2019-06-23 PROCEDURE — 99217 ZZC OBSERVATION CARE DISCHARGE: CPT | Performed by: HOSPITALIST

## 2019-06-23 PROCEDURE — 25000132 ZZH RX MED GY IP 250 OP 250 PS 637: Mod: GY | Performed by: PHYSICIAN ASSISTANT

## 2019-06-23 PROCEDURE — G0378 HOSPITAL OBSERVATION PER HR: HCPCS

## 2019-06-23 PROCEDURE — 97161 PT EVAL LOW COMPLEX 20 MIN: CPT | Mod: GP | Performed by: PHYSICAL THERAPIST

## 2019-06-23 RX ORDER — OXYCODONE HYDROCHLORIDE 5 MG/1
2.5 TABLET ORAL 2 TIMES DAILY PRN
Qty: 15 TABLET | Refills: 0 | Status: SHIPPED | OUTPATIENT
Start: 2019-06-23 | End: 2019-07-04

## 2019-06-23 RX ADMIN — OXYCODONE HYDROCHLORIDE 2.5 MG: 5 TABLET ORAL at 08:29

## 2019-06-23 RX ADMIN — VANCOMYCIN HYDROCHLORIDE 125 MG: KIT at 11:04

## 2019-06-23 RX ADMIN — GABAPENTIN 800 MG: 400 CAPSULE ORAL at 08:29

## 2019-06-23 RX ADMIN — TRAMADOL HYDROCHLORIDE 50 MG: 50 TABLET, COATED ORAL at 11:04

## 2019-06-23 RX ADMIN — SERTRALINE HYDROCHLORIDE 100 MG: 100 TABLET ORAL at 08:30

## 2019-06-23 RX ADMIN — LISINOPRIL 40 MG: 40 TABLET ORAL at 08:29

## 2019-06-23 RX ADMIN — AMLODIPINE BESYLATE 10 MG: 10 TABLET ORAL at 08:30

## 2019-06-23 RX ADMIN — ISOSORBIDE DINITRATE 10 MG: 10 TABLET ORAL at 08:31

## 2019-06-23 RX ADMIN — OXYCODONE HYDROCHLORIDE 2.5 MG: 5 TABLET ORAL at 01:10

## 2019-06-23 RX ADMIN — ACETAMINOPHEN 1000 MG: 500 TABLET, FILM COATED ORAL at 08:28

## 2019-06-23 RX ADMIN — METOPROLOL SUCCINATE 100 MG: 100 TABLET, EXTENDED RELEASE ORAL at 08:30

## 2019-06-23 RX ADMIN — OXYCODONE HYDROCHLORIDE 2.5 MG: 5 TABLET ORAL at 12:27

## 2019-06-23 RX ADMIN — CLONIDINE HYDROCHLORIDE 0.1 MG: 0.1 TABLET ORAL at 08:30

## 2019-06-23 RX ADMIN — VANCOMYCIN HYDROCHLORIDE 125 MG: KIT at 12:27

## 2019-06-23 ASSESSMENT — MIFFLIN-ST. JEOR: SCORE: 938.91

## 2019-06-23 NOTE — PROGRESS NOTES
Discharge Planner   Discharge Plans in progress: Pt discharge home to regular setting. Pt lives independently in own apartment  Barriers to discharge plan: None  Follow up plan: Will resume Homecare services       Entered by: Severino Valenzuela 06/23/2019 1:08 PM

## 2019-06-23 NOTE — CONSULTS
CTS identifies pt as high risk due to Elevated EMI. Patient was admitted on 6/22/19 for back pain. Patient has a history of chronic hip pain on neurontin, HTN treated with medications, and anxiety. Patient has had 2 hospitalizations, one in May for ESBL UTI and a fall and in June for ESBL UTI, C Diff, and GI Bleed. Patient is open to home care through [facility].  Patient has a previously scheduled hospital follow up appointment with Marcy Soares at the [facility] for July 6th. AVS discharge instructions were updated for the pt.     Handoff will be given to PCP clinic at discharge.  SW to follow for discharge planning with PT consult pending.    CM will continue to follow patient for any additional discharge needs.     Isabel Rowland MA-ROBERTA  Care Transition Services  [phone]

## 2019-06-23 NOTE — DISCHARGE SUMMARY
[facility]  Hospitalist Discharge Summary       Date of Admission:  6/22/2019  Date of Discharge:  6/23/2019  Discharging Provider: Juliano Mckeon MD      Discharge Diagnoses   Acute on chronic back pain    Follow-ups Needed After Discharge   Follow-up Appointments     Follow-up and recommended labs and tests       Follow up with primary care provider, Marcy Soares, within 7 days   for hospital follow- up.  No follow up labs or test are needed.             Unresulted Labs Ordered in the Past 30 Days of this Admission     No orders found for last 61 day(s).      These results will be followed up by NA    Discharge Disposition   Discharged to home  Condition at discharge: Stable    Hospital Course   Daya Ignacio is a 88 year old female with hypertension, chronic hip pain, anxiety, and two recent hospital stays for ESBL UTI, acute GI bleed, and CDiff who returns to [facility] ED with back pain.  She states back pain was present during her last admission (6/10-6/16) but not as bad.  She also had noticed pins and needles in her bilateral legs but has worsened since her hospital stay.  She was discharged home to independent senior living with [facility] and was also checked on by an RN Case Manager by phone.  Phone note states patient reported she was doing well and pain was relieved by oxycodone, however she doesn't remember this and states she wasn't sent with oxycodone and that tylenol hasn't been enough.  She has found it difficult to get out of bed and get around the house because of pain.  She states when pain is severe, her legs feel weak but that she doesn't feel weak all the time.  Her frequent loose stools persist on oral vanc but she is only about long-term through her intended course.  She denies fever, chills, shortness of breath, chest pain, difficulty with urination, abdominal cramping, nausea, vomiting, unusual bleeding, or new rash.       In the ER, vitals stable.  BMP within normal limits.  CBC  within normal limits save for hemoglobin 9.8 (improved from prior).  XR Lumbar Spine showed new 5% suprior endplate fracture of L2, stable L3 and L4 endplate compression fractures, degenerative SI joints, and a right hip replacement.  Patient felt unable to return home due to ongoing pain and difficulty at home so admission to Obs requested for pain control and placement.      Patient was here recently from 5/26-5/31 for ESBL UTI treated with Bactrim and again 6/10-6/16 for ESBL UTI (now resistant to Bactrim so treated with meropenem) and CDiff colitis treated with oral vanc.  She was discharged home with [facility] and family support.  Patient states her son works but will call and check on her daily.  He will come over if she asks him to, otherwise she is at home alone most of the time.  She has been relying on a walker and other assistive devices at home because she has a hard time moving around with the pain.  She has not taken any of her pills today.      Patient's pain was controlled with low dose oxy and tramadol. She has worked with physical therapy today and is safe to return to her home. She already has home services, including PT. Patient today states her pain is well controlled. No chest pain, sob, abdo pain, n/v/d. She states she does not usually have constipation. She will discharge with a short supply of low dose oxy. I educated her on the side effect of constipation associated with oxy. Her son will pick her up. I indicated I would be happy to meet with him and answer his questions when he arrives.     Consultations This Hospital Stay   SOCIAL WORK IP CONSULT  PHYSICAL THERAPY ADULT IP CONSULT  CARE COORDINATOR IP CONSULT  ADVANCE DIRECTIVE IP CONSULT    Code Status   Full Code    Time Spent on this Encounter   I, Juliano Mckeon, personally saw the patient today and spent greater than 30 minutes discharging this patient.       Juliano Mckeon MD  [facility]  ______________________________________________________________________    Physical Exam   Vital Signs: Temp: 97.6  F (36.4  C) Temp src: Oral BP: 149/48 Pulse: 60   Resp: 18 SpO2: 96 % O2 Device: None (Room air)    Weight: 126 lbs 0 oz  Constitutional: awake, alert, cooperative, no apparent distress, and appears stated age  Respiratory: No increased work of breathing, good air exchange, clear to auscultation bilaterally, no crackles or wheezing  Cardiovascular: Normal apical impulse, regular rate and rhythm, normal S1 and S2, no S3 or S4, and no murmur noted  GI: No scars, normal bowel sounds, soft, non-distended, non-tender, no masses palpated, no hepatosplenomegally       Primary Care Physician   Marcy Soares    Discharge Orders      Reason for your hospital stay    Acute on chronic back pain     Follow-up and recommended labs and tests     Follow up with primary care provider, Marcy Soares, within 7 days for hospital follow- up.  No follow up labs or test are needed.     Activity    Your activity upon discharge: activity as tolerated     Discharge Instructions    Be aware that oxycodone can cause constipation. You can try over the counter medications if you feel you are becoming constipated.     Diet    Follow this diet upon discharge: Advance to a regular diet as tolerated       Significant Results and Procedures   Most Recent 3 CBC's:  Recent Labs   Lab Test 06/22/19  1021 06/16/19  0652 06/15/19  0652  06/13/19  1221   WBC 4.6  --  4.1  --  6.1   HGB 9.8* 8.3* 8.5*   < > 8.6*   MCV 97  --  93  --  93     --  141*  --  139*    < > = values in this interval not displayed.     Most Recent 3 BMP's:  Recent Labs   Lab Test 06/22/19  1021 06/16/19  0652 06/14/19  1741  06/13/19  0642    141  --   --  141   POTASSIUM 3.8 3.8 3.7   < > 3.3*   CHLORIDE 105 105  --   --  109   CO2 26 33*  --   --  27   BUN 10 14  --   --  11   CR 0.71 0.58  --   --  0.50*   ANIONGAP 6 3  --   --  5   DAVE  8.7 8.7  --   --  8.3*   GLC 99 91  --   --  78    < > = values in this interval not displayed.   ,   Results for orders placed or performed during the hospital encounter of 06/22/19   Lumbar spine XR, 2-3 views    Narrative    XR LUMBAR SPINE 2-3 VIEWS 6/22/2019 11:39 AM     COMPARISON: Chest x-ray 2 views 5/28/2017    HISTORY: Midline lumbar pain    FINDINGS:   5 lumbar type vertebral bodies.    5% compression of the L2 superior endplate, new compared to the prior  chest x-ray but possibly chronic. No other findings suspicious for an  acute abnormality.    T11 and L1 vertebral plana again noted. 40% L3 superior endplate and  subtle L4 superior endplate compressions again noted. Normal height of  the L5 vertebral body. Advanced L3-4 and L4-5 degeneration. Moderate  degenerative change of the sacroiliac joints. Prosthetic right hip.    RICKEY COMBS MD       Discharge Medications   Current Discharge Medication List      START taking these medications    Details   oxyCODONE (ROXICODONE) 5 MG tablet Take 0.5 tablets (2.5 mg) by mouth 2 times daily as needed for severe pain (Pain 8-10 unrelieved with APAP, T3, and/or tramadol.)  Qty: 15 tablet, Refills: 0    Associated Diagnoses: Back pain, unspecified back location, unspecified back pain laterality, unspecified chronicity         CONTINUE these medications which have NOT CHANGED    Details   acetaminophen (TYLENOL) 500 MG tablet Take 1,000 mg by mouth as needed for mild pain      amLODIPine (NORVASC) 10 MG tablet Take 1 tablet (10 mg) by mouth daily  Qty: 90 tablet, Refills: 1    Associated Diagnoses: Non-STEMI (non-ST elevated myocardial infarction) (H)      Calcium Carbonate (CALCIUM 600 PO) Take 1 tablet by mouth 2 times daily       Cholecalciferol (VITAMIN D3) 2000 UNITS CAPS Take 2,000 Units by mouth daily (with dinner)      cloNIDine (CATAPRES) 0.1 MG tablet TAKE ONE TABLET BY MOUTH TWO TIMES A DAY  Qty: 180 tablet, Refills: 0    Associated Diagnoses: Essential  hypertension      estradiol (ESTRACE) 0.1 MG/GM vaginal cream Place 2 g vaginally three times a week paraben-free  Qty: 6 g, Refills: 3    Associated Diagnoses: Symptomatic menopausal or female climacteric states      !! gabapentin (NEURONTIN) 800 MG tablet Take 800 mg by mouth 2 times daily In AM and afternoon      !! gabapentin (NEURONTIN) 800 MG tablet Take 1,600 mg by mouth At Bedtime      isosorbide dinitrate (ISORDIL) 10 MG tablet Take 1 tablet (10 mg) by mouth 3 times daily  Qty: 270 tablet, Refills: 3    Associated Diagnoses: Non-STEMI (non-ST elevated myocardial infarction) (H)      lisinopril (PRINIVIL/ZESTRIL) 40 MG tablet Take 1 tablet (40 mg) by mouth daily  Qty: 90 tablet, Refills: 1    Associated Diagnoses: Essential hypertension      metoprolol succinate ER (TOPROL-XL) 100 MG 24 hr tablet Take 1 tablet (100 mg) by mouth daily  Qty: 90 tablet, Refills: 1    Associated Diagnoses: Essential hypertension      Multiple Vitamins-Minerals (PRESERVISION AREDS) CAPS Take 1 capsule by mouth 2 times daily      sertraline (ZOLOFT) 100 MG tablet Take 1 tablet (100 mg) by mouth daily  Qty: 90 tablet, Refills: 1    Associated Diagnoses: Generalized anxiety disorder      vancomycin (VANCOCIN) 125 MG capsule Take 1 capsule (125 mg) by mouth 4 times daily for 14 days  Qty: 56 capsule, Refills: 0    Associated Diagnoses: C. difficile colitis      vitamin C (ASCORBIC ACID) 500 MG tablet Take 500 mg by mouth daily       !! - Potential duplicate medications found. Please discuss with provider.        Allergies   Allergies   Allergen Reactions     Atorvastatin Muscle Pain (Myalgia)     Macrobid [Nitrofurantoin Anhydrous] Other (See Comments)     Body aches     Sulfa Drugs

## 2019-06-23 NOTE — PLAN OF CARE
PRIMARY DIAGNOSIS: ACUTE BACK/ Sacral PAIN  OUTPATIENT/OBSERVATION GOALS TO BE MET BEFORE DISCHARGE:  1. Pain Status: Improved-controlled with oral pain medications.     2. Return to near baseline physical activity: No     3. Cleared for discharge by consultants (if involved): No     Vitals are stable. Patient is Alert and Oriented x4. They are 1 Assist with Gait Belt and Walker.  Pt is on enteric precautions for C-diff and contact precautions for ESBL.  Pt is a 2 gram sodium diet.  They are rating back pain at 7/10, Tylenol and Oxy given along with heat. Patient is Saline locked.  Plan of care is SW, PT, pain management.     Discharge Planner Nurse   Safe discharge environment identified: No  Barriers to discharge: Yes         Please review provider order for any additional goals.   Nurse to notify provider when observation goals have been met and patient is ready for discharge.

## 2019-06-23 NOTE — PLAN OF CARE
Patient's After Visit Summary was reviewed with patient and/or family.   Patient verbalized understanding of After Visit Summary, recommended follow up and was given an opportunity to ask questions.   Discharge medications sent home with patient/family: YES   Discharged with son    OBSERVATION patient END time: 0470

## 2019-06-23 NOTE — PROGRESS NOTES
06/23/19 1030   Quick Adds   Type of Visit Initial PT Evaluation   Living Environment   Lives With alone   Living Arrangements apartment  (sr living, [facility])   Transportation Anticipated   ([facility] has a bus)   Living Environment Comment Pt lives in an apartment, elevator access. Pt reports she had 1 PT home visit PTA for back pain.    Self-Care   Usual Activity Tolerance good   Current Activity Tolerance good   Regular Exercise   (walks, 1 PT session, pt is active)   Equipment Currently Used at Home cane, straight;walker, rolling   Activity/Exercise/Self-Care Comment pt indep in ADL, does her med's, pt does her own cooking/facility bus takes her. Nurse checks vitals.   Functional Level Prior   Ambulation 0-->independent  (SEC in community)   Transferring 0-->independent   Toileting 0-->independent   Bathing 0-->independent   Communication 0-->understands/communicates without difficulty   Swallowing 0-->swallows foods/liquids without difficulty   Cognition 0 - no cognition issues reported   Fall history within last six months yes   Number of times patient has fallen within last six months 2   Prior Functional Level Comment pt reports she fell due to UTI   General Information   Onset of Illness/Injury or Date of Surgery - Date 06/22/19   Referring Physician Isabel Evans   Patient/Family Goals Statement home today   Pertinent History of Current Problem (include personal factors and/or comorbidities that impact the POC) Acute sacral pain in setting of chronic hip pain, diarrhea C-Diff , pain significantly reduced at time of PT eval.   Precautions/Limitations fall precautions   Weight-Bearing Status - LLE full weight-bearing   Weight-Bearing Status - RLE full weight-bearing   General Observations pt resting on Aqua K pad, agreeable to PT   Cognitive Status Examination   Orientation orientation to person, place and time   Level of Consciousness alert   Follows Commands and Answers Questions 100% of the  "time;able to follow single-step instructions   Personal Safety and Judgment intact   Memory intact   Pain Assessment   Patient Currently in Pain Yes, see Vital Sign flowsheet  (2/10 low back)   Integumentary/Edema   Integumentary/Edema no deficits were identifed   Range of Motion (ROM)   ROM Comment WFL   Strength   Strength Comments WNL BLE   Bed Mobility   Bed Mobility Comments Indep with HOB near flat,. no SR.    Transfer Skills   Transfer Comments Indep with fww   Gait   Gait Comments gait with fww 260' with good pace, non antalgic, continuous and reciprocal. no instability or LOB. Pain unchanged from at rest , rated 2/10 . Mod I,   Balance   Balance Comments no LOB with fww   Coordination   Coordination no deficits were identified   Muscle Tone   Muscle Tone no deficits were identified   Clinical Impression   Criteria for Skilled Therapeutic Intervention evaluation only   PT Diagnosis assess for safe dc home   Influenced by the following impairments mild pain 2/10 in her back   Functional limitations due to impairments none   Clinical Presentation Stable/Uncomplicated   Clinical Presentation Rationale Per clinical observation   Clinical Decision Making (Complexity) Low complexity   Anticipated Discharge Disposition Home  (pt has home PT appts if needed)   Risk & Benefits of therapy have been explained Yes   Patient, Family & other staff in agreement with plan of care Yes   Clinical Impression Comments Pt feeling much better today, pain reduced significantly with med's and heating pad, no diarrhea today   [facility] AM-PAC  \"6 Clicks\" V.2 Basic Mobility Inpatient Short Form   1. Turning from your back to your side while in a flat bed without using bedrails? 4 - None   2. Moving from lying on your back to sitting on the side of a flat bed without using bedrails? 4 - None   3. Moving to and from a bed to a chair (including a wheelchair)? 4 - None   4. Standing up from a chair using your arms (e.g., " wheelchair, or bedside chair)? 4 - None   5. To walk in hospital room? 4 - None   6. Climbing 3-5 steps with a railing? 3 - A Little   Basic Mobility Raw Score (Score out of 24.Lower scores equate to lower levels of function) 23   Total Evaluation Time   Total Evaluation Time (Minutes) 28

## 2019-06-23 NOTE — PLAN OF CARE
Patient No change    PRIMARY DIAGNOSIS: ACUTE BACK PAIN  OUTPATIENT/OBSERVATION GOALS TO BE MET BEFORE DISCHARGE:  1. Pain Status: Improved-controlled with oral pain medications.    2. Return to near baseline physical activity: No    3. Cleared for discharge by consultants (if involved): No    Vitals are Temp: 97.5  F (36.4  C) Temp src: Oral BP: 148/50 Pulse: 56   Resp: 18 SpO2: 95 %.  Patient is Alert and Oriented x4. They are 1 Assist with Gait Belt and Walker.  Pt is on enteric precautions for C-diff and contact precautions for ESBL.  Pt is a 2 gram sodium diet.  They are complaining of 8/10 pain in their lower back.  Tylenol, Oxycodone and Ultram given for pain.  Patient is Saline locked.  Plan of care is SW, PT, Care coordinator and Advance directive.    Discharge Planner Nurse   Safe discharge environment identified: No  Barriers to discharge: Yes            Please review provider order for any additional goals.   Nurse to notify provider when observation goals have been met and patient is ready for discharge.

## 2019-06-24 ENCOUNTER — TELEPHONE (OUTPATIENT)
Dept: FAMILY MEDICINE | Facility: CLINIC | Age: 84
End: 2019-06-24

## 2019-06-24 ENCOUNTER — PATIENT OUTREACH (OUTPATIENT)
Dept: CARE COORDINATION | Facility: CLINIC | Age: 84
End: 2019-06-24

## 2019-06-24 LAB
ERYTHROCYTE [DISTWIDTH] IN BLOOD BY AUTOMATED COUNT: 16.1 % (ref 10–15)
HCT VFR BLD AUTO: 31.4 % (ref 35–47)
HGB BLD-MCNC: 9.7 G/DL (ref 11.7–15.7)
MCH RBC QN AUTO: 29.8 PG (ref 26.5–33)
MCHC RBC AUTO-ENTMCNC: 30.9 G/DL (ref 31.5–36.5)
MCV RBC AUTO: 97 FL (ref 78–100)
PLATELET # BLD AUTO: 224 10E9/L (ref 150–450)
RBC # BLD AUTO: 3.25 10E12/L (ref 3.8–5.2)
WBC # BLD AUTO: 4.5 10E9/L (ref 4–11)

## 2019-06-24 PROCEDURE — 85027 COMPLETE CBC AUTOMATED: CPT | Performed by: FAMILY MEDICINE

## 2019-06-24 NOTE — PROGRESS NOTES
Clinic Care Coordination Contact  Care Coordination Communication    Clinical Data: patient admitted to [facility] observation unit 6/22-6/23 for complaints of back pain.  Patient discharged home with low dose oxy and tramadol.  Patient to resume home care services.    Patient resides at [facility] and receives no service/meals through them.     Home Care Contact:              Home Care Agency: [facility] RN/PT              Contact name () and phone number: ROBERTA Hall [phone]              Care Coordination contacted home care: Yes              Resumption of care date: 6/17/19     Plan:  Patient is independent at baseline. She has family involved in care and are supportive. Do not anticipate any care coordination needs at this time.  No outreach will be made unless new referral is made or a change in the pt's status occurs. Care coordination letter sent on 6/17/19.      Bre Bowie RN  Care Coordination  Phone:  [phone]  Email: [email or web address]

## 2019-06-24 NOTE — PROGRESS NOTES
Transition Communication Hand-off for Care Transitions to Next Level of Care Provider    Name: Daya Ignacio  : 10/11/1930  MRN #: [id number]  Primary Care Provider: Marcy Soares     Primary Clinic: [address] [facility] [address]     Reason for Hospitalization:  Generalized muscle weakness [M62.81]  Closed compression fracture of L2 lumbar vertebra, initial encounter (H) [S32.020A]  Diarrhea, unspecified type [R19.7]  Admit Date/Time: 2019 10:04 AM  Discharge Date: 19  Payor Source: Payor: MEDICARE / Plan: MEDICARE / Product Type: Medicare /     Readmission Assessment Measure (EMI) Risk Score/category: Elevated           Reason for Communication Hand-off Referral: Fragility    Discharge Plan: Home with home care       Concern for non-adherence with plan of care:   No  Discharge Needs Assessment:  Needs      Most Recent Value   Equipment Currently Used at Home  cane, straight, walker, rolling          Already enrolled in Tele-monitoring program and name of program:  NA  Follow-up specialty is recommended: No    Follow-up plan:    Future Appointments   Date Time Provider Department Center   2019 11:15 AM Marcy Soares, [facility]       Any outstanding tests or procedures:         Key Recommendations:  CTS identifies pt as high risk due to Elevated EMI. Patient was admitted on 19 for back pain. Patient has a history of chronic hip pain on neurontin, HTN treated with medications, and anxiety. Patient has had 2 hospitalizations, one in May for ESBL UTI and a fall and in  for ESBL UTI, C Diff, and GI Bleed. Patient is open to home care through [facility].  No gap in care identified but patient not managing well at home d/t ongoing pain.  Patient was evaluated by PT and patient discharged to home with resumption of home PT.   Please follow up with patient for discharge on oxycodone for pain control.     Isabel Rowland    AVS/Discharge Summary is the source of truth; this is a  helpful guide for improved communication of patient story

## 2019-06-25 DIAGNOSIS — Z53.9 DIAGNOSIS NOT YET DEFINED: Primary | ICD-10-CM

## 2019-06-25 PROCEDURE — 99207 C MD CERTIFICATION HHA PATIENT: CPT | Performed by: FAMILY MEDICINE

## 2019-07-04 ENCOUNTER — HOSPITAL ENCOUNTER (INPATIENT)
Facility: CLINIC | Age: 84
LOS: 1 days | Discharge: NURSING, REHAB OR LONG-TERM CARE | DRG: 690 | End: 2019-07-07
Attending: EMERGENCY MEDICINE | Admitting: INTERNAL MEDICINE
Payer: MEDICARE

## 2019-07-04 ENCOUNTER — APPOINTMENT (OUTPATIENT)
Dept: CT IMAGING | Facility: CLINIC | Age: 84
DRG: 690 | End: 2019-07-04
Attending: EMERGENCY MEDICINE
Payer: MEDICARE

## 2019-07-04 DIAGNOSIS — M62.81 GENERALIZED MUSCLE WEAKNESS: ICD-10-CM

## 2019-07-04 DIAGNOSIS — A04.72 C. DIFFICILE COLITIS: ICD-10-CM

## 2019-07-04 DIAGNOSIS — N39.0 UTI DUE TO EXTENDED-SPECTRUM BETA LACTAMASE (ESBL) PRODUCING ESCHERICHIA COLI: ICD-10-CM

## 2019-07-04 DIAGNOSIS — N39.0 URINARY TRACT INFECTION DUE TO EXTENDED-SPECTRUM BETA LACTAMASE (ESBL) PRODUCING ESCHERICHIA COLI: Primary | ICD-10-CM

## 2019-07-04 DIAGNOSIS — Z16.12 URINARY TRACT INFECTION DUE TO EXTENDED-SPECTRUM BETA LACTAMASE (ESBL) PRODUCING ESCHERICHIA COLI: Primary | ICD-10-CM

## 2019-07-04 DIAGNOSIS — B96.29 UTI DUE TO EXTENDED-SPECTRUM BETA LACTAMASE (ESBL) PRODUCING ESCHERICHIA COLI: ICD-10-CM

## 2019-07-04 DIAGNOSIS — Z16.12 UTI DUE TO EXTENDED-SPECTRUM BETA LACTAMASE (ESBL) PRODUCING ESCHERICHIA COLI: ICD-10-CM

## 2019-07-04 DIAGNOSIS — B96.29 URINARY TRACT INFECTION DUE TO EXTENDED-SPECTRUM BETA LACTAMASE (ESBL) PRODUCING ESCHERICHIA COLI: Primary | ICD-10-CM

## 2019-07-04 LAB
ALBUMIN UR-MCNC: 50 MG/DL
ANION GAP SERPL CALCULATED.3IONS-SCNC: 9 MMOL/L (ref 3–14)
APPEARANCE UR: ABNORMAL
BACTERIA #/AREA URNS HPF: ABNORMAL /HPF
BASOPHILS # BLD AUTO: 0 10E9/L (ref 0–0.2)
BASOPHILS NFR BLD AUTO: 0.4 %
BILIRUB UR QL STRIP: NEGATIVE
BUN SERPL-MCNC: 10 MG/DL (ref 7–30)
CALCIUM SERPL-MCNC: 9.4 MG/DL (ref 8.5–10.1)
CHLORIDE SERPL-SCNC: 100 MMOL/L (ref 94–109)
CO2 SERPL-SCNC: 24 MMOL/L (ref 20–32)
COLOR UR AUTO: ABNORMAL
CREAT SERPL-MCNC: 0.43 MG/DL (ref 0.52–1.04)
DIFFERENTIAL METHOD BLD: ABNORMAL
EOSINOPHIL # BLD AUTO: 0 10E9/L (ref 0–0.7)
EOSINOPHIL NFR BLD AUTO: 0.1 %
ERYTHROCYTE [DISTWIDTH] IN BLOOD BY AUTOMATED COUNT: 16.6 % (ref 10–15)
GFR SERPL CREATININE-BSD FRML MDRD: >90 ML/MIN/{1.73_M2}
GLUCOSE SERPL-MCNC: 102 MG/DL (ref 70–99)
GLUCOSE UR STRIP-MCNC: NEGATIVE MG/DL
HCT VFR BLD AUTO: 37.3 % (ref 35–47)
HGB BLD-MCNC: 12.5 G/DL (ref 11.7–15.7)
HGB UR QL STRIP: ABNORMAL
IMM GRANULOCYTES # BLD: 0 10E9/L (ref 0–0.4)
IMM GRANULOCYTES NFR BLD: 0.5 %
KETONES UR STRIP-MCNC: 20 MG/DL
LEUKOCYTE ESTERASE UR QL STRIP: ABNORMAL
LYMPHOCYTES # BLD AUTO: 0.8 10E9/L (ref 0.8–5.3)
LYMPHOCYTES NFR BLD AUTO: 10.8 %
MCH RBC QN AUTO: 30.9 PG (ref 26.5–33)
MCHC RBC AUTO-ENTMCNC: 33.5 G/DL (ref 31.5–36.5)
MCV RBC AUTO: 92 FL (ref 78–100)
MONOCYTES # BLD AUTO: 0.6 10E9/L (ref 0–1.3)
MONOCYTES NFR BLD AUTO: 8.5 %
MUCOUS THREADS #/AREA URNS LPF: PRESENT /LPF
NEUTROPHILS # BLD AUTO: 6 10E9/L (ref 1.6–8.3)
NEUTROPHILS NFR BLD AUTO: 79.7 %
NITRATE UR QL: POSITIVE
NRBC # BLD AUTO: 0 10*3/UL
NRBC BLD AUTO-RTO: 0 /100
PH UR STRIP: 6.5 PH (ref 5–7)
PLATELET # BLD AUTO: 235 10E9/L (ref 150–450)
POTASSIUM SERPL-SCNC: 3.6 MMOL/L (ref 3.4–5.3)
RBC # BLD AUTO: 4.04 10E12/L (ref 3.8–5.2)
RBC #/AREA URNS AUTO: 2 /HPF (ref 0–2)
SODIUM SERPL-SCNC: 133 MMOL/L (ref 133–144)
SOURCE: ABNORMAL
SP GR UR STRIP: 1.01 (ref 1–1.03)
SQUAMOUS #/AREA URNS AUTO: 1 /HPF (ref 0–1)
UROBILINOGEN UR STRIP-MCNC: NORMAL MG/DL (ref 0–2)
WBC # BLD AUTO: 7.5 10E9/L (ref 4–11)
WBC #/AREA URNS AUTO: 51 /HPF (ref 0–5)

## 2019-07-04 PROCEDURE — 36415 COLL VENOUS BLD VENIPUNCTURE: CPT | Performed by: EMERGENCY MEDICINE

## 2019-07-04 PROCEDURE — 87040 BLOOD CULTURE FOR BACTERIA: CPT | Performed by: EMERGENCY MEDICINE

## 2019-07-04 PROCEDURE — G0378 HOSPITAL OBSERVATION PER HR: HCPCS

## 2019-07-04 PROCEDURE — 96366 THER/PROPH/DIAG IV INF ADDON: CPT

## 2019-07-04 PROCEDURE — 74176 CT ABD & PELVIS W/O CONTRAST: CPT

## 2019-07-04 PROCEDURE — 85025 COMPLETE CBC W/AUTO DIFF WBC: CPT | Performed by: EMERGENCY MEDICINE

## 2019-07-04 PROCEDURE — 99285 EMERGENCY DEPT VISIT HI MDM: CPT | Mod: 25

## 2019-07-04 PROCEDURE — 87186 SC STD MICRODIL/AGAR DIL: CPT | Performed by: EMERGENCY MEDICINE

## 2019-07-04 PROCEDURE — 25000128 H RX IP 250 OP 636: Performed by: INTERNAL MEDICINE

## 2019-07-04 PROCEDURE — 96365 THER/PROPH/DIAG IV INF INIT: CPT

## 2019-07-04 PROCEDURE — 99207 ZZC CDG-CODE CATEGORY CHANGED: CPT | Performed by: INTERNAL MEDICINE

## 2019-07-04 PROCEDURE — 25800030 ZZH RX IP 258 OP 636: Performed by: INTERNAL MEDICINE

## 2019-07-04 PROCEDURE — 99220 ZZC INITIAL OBSERVATION CARE,LEVL III: CPT | Performed by: INTERNAL MEDICINE

## 2019-07-04 PROCEDURE — 87086 URINE CULTURE/COLONY COUNT: CPT | Performed by: EMERGENCY MEDICINE

## 2019-07-04 PROCEDURE — 80048 BASIC METABOLIC PNL TOTAL CA: CPT | Performed by: EMERGENCY MEDICINE

## 2019-07-04 PROCEDURE — 87088 URINE BACTERIA CULTURE: CPT | Performed by: EMERGENCY MEDICINE

## 2019-07-04 PROCEDURE — 25000132 ZZH RX MED GY IP 250 OP 250 PS 637: Mod: GY | Performed by: INTERNAL MEDICINE

## 2019-07-04 PROCEDURE — 25000128 H RX IP 250 OP 636: Performed by: EMERGENCY MEDICINE

## 2019-07-04 PROCEDURE — 81001 URINALYSIS AUTO W/SCOPE: CPT | Performed by: EMERGENCY MEDICINE

## 2019-07-04 RX ORDER — VANCOMYCIN HYDROCHLORIDE 50 MG/ML
125 KIT ORAL 4 TIMES DAILY
Status: DISCONTINUED | OUTPATIENT
Start: 2019-07-04 | End: 2019-07-07 | Stop reason: HOSPADM

## 2019-07-04 RX ORDER — GABAPENTIN 400 MG/1
1600 CAPSULE ORAL AT BEDTIME
Status: DISCONTINUED | OUTPATIENT
Start: 2019-07-04 | End: 2019-07-07 | Stop reason: HOSPADM

## 2019-07-04 RX ORDER — MEROPENEM 1 G/1
1 INJECTION, POWDER, FOR SOLUTION INTRAVENOUS EVERY 8 HOURS
Status: DISCONTINUED | OUTPATIENT
Start: 2019-07-04 | End: 2019-07-04

## 2019-07-04 RX ORDER — NALOXONE HYDROCHLORIDE 0.4 MG/ML
.1-.4 INJECTION, SOLUTION INTRAMUSCULAR; INTRAVENOUS; SUBCUTANEOUS
Status: DISCONTINUED | OUTPATIENT
Start: 2019-07-04 | End: 2019-07-07 | Stop reason: HOSPADM

## 2019-07-04 RX ORDER — SODIUM CHLORIDE 9 MG/ML
1000 INJECTION, SOLUTION INTRAVENOUS CONTINUOUS
Status: DISCONTINUED | OUTPATIENT
Start: 2019-07-04 | End: 2019-07-06

## 2019-07-04 RX ORDER — ACETAMINOPHEN 325 MG/1
650 TABLET ORAL EVERY 4 HOURS PRN
Status: DISCONTINUED | OUTPATIENT
Start: 2019-07-04 | End: 2019-07-07 | Stop reason: HOSPADM

## 2019-07-04 RX ORDER — ISOSORBIDE DINITRATE 10 MG/1
10 TABLET ORAL 3 TIMES DAILY
Status: DISCONTINUED | OUTPATIENT
Start: 2019-07-04 | End: 2019-07-07 | Stop reason: HOSPADM

## 2019-07-04 RX ORDER — CLONIDINE HYDROCHLORIDE 0.1 MG/1
0.1 TABLET ORAL 2 TIMES DAILY
Status: DISCONTINUED | OUTPATIENT
Start: 2019-07-04 | End: 2019-07-07 | Stop reason: HOSPADM

## 2019-07-04 RX ORDER — MEROPENEM 500 MG/1
500 INJECTION, POWDER, FOR SOLUTION INTRAVENOUS EVERY 6 HOURS
Status: DISCONTINUED | OUTPATIENT
Start: 2019-07-04 | End: 2019-07-04

## 2019-07-04 RX ORDER — LISINOPRIL 40 MG/1
40 TABLET ORAL DAILY
Status: DISCONTINUED | OUTPATIENT
Start: 2019-07-04 | End: 2019-07-07 | Stop reason: HOSPADM

## 2019-07-04 RX ORDER — MEROPENEM 1 G/1
1 INJECTION, POWDER, FOR SOLUTION INTRAVENOUS ONCE
Status: COMPLETED | OUTPATIENT
Start: 2019-07-04 | End: 2019-07-05

## 2019-07-04 RX ORDER — CHLORAL HYDRATE 500 MG
1 CAPSULE ORAL DAILY
COMMUNITY
End: 2019-09-16

## 2019-07-04 RX ORDER — SODIUM CHLORIDE 9 MG/ML
INJECTION, SOLUTION INTRAVENOUS CONTINUOUS
Status: DISCONTINUED | OUTPATIENT
Start: 2019-07-04 | End: 2019-07-06

## 2019-07-04 RX ORDER — ONDANSETRON 4 MG/1
4 TABLET, ORALLY DISINTEGRATING ORAL EVERY 6 HOURS PRN
Status: DISCONTINUED | OUTPATIENT
Start: 2019-07-04 | End: 2019-07-07 | Stop reason: HOSPADM

## 2019-07-04 RX ORDER — ACETAMINOPHEN 650 MG/1
650 SUPPOSITORY RECTAL EVERY 4 HOURS PRN
Status: DISCONTINUED | OUTPATIENT
Start: 2019-07-04 | End: 2019-07-07 | Stop reason: HOSPADM

## 2019-07-04 RX ORDER — AMLODIPINE BESYLATE 10 MG/1
10 TABLET ORAL DAILY
Status: DISCONTINUED | OUTPATIENT
Start: 2019-07-04 | End: 2019-07-07 | Stop reason: HOSPADM

## 2019-07-04 RX ORDER — IBUPROFEN 200 MG
400 TABLET ORAL EVERY 4 HOURS PRN
Status: ON HOLD | COMMUNITY
End: 2019-07-17

## 2019-07-04 RX ORDER — MEROPENEM 1 G/1
1 INJECTION, POWDER, FOR SOLUTION INTRAVENOUS EVERY 8 HOURS
Status: DISCONTINUED | OUTPATIENT
Start: 2019-07-04 | End: 2019-07-07

## 2019-07-04 RX ORDER — GABAPENTIN 400 MG/1
800 CAPSULE ORAL 2 TIMES DAILY
Status: DISCONTINUED | OUTPATIENT
Start: 2019-07-04 | End: 2019-07-07 | Stop reason: HOSPADM

## 2019-07-04 RX ORDER — ONDANSETRON 2 MG/ML
4 INJECTION INTRAMUSCULAR; INTRAVENOUS EVERY 6 HOURS PRN
Status: DISCONTINUED | OUTPATIENT
Start: 2019-07-04 | End: 2019-07-07 | Stop reason: HOSPADM

## 2019-07-04 RX ORDER — SERTRALINE HYDROCHLORIDE 100 MG/1
100 TABLET, FILM COATED ORAL DAILY
Status: DISCONTINUED | OUTPATIENT
Start: 2019-07-04 | End: 2019-07-07 | Stop reason: HOSPADM

## 2019-07-04 RX ORDER — METOPROLOL SUCCINATE 100 MG/1
100 TABLET, EXTENDED RELEASE ORAL DAILY
Status: DISCONTINUED | OUTPATIENT
Start: 2019-07-04 | End: 2019-07-07 | Stop reason: HOSPADM

## 2019-07-04 RX ADMIN — MEROPENEM 1 G: 1 INJECTION, POWDER, FOR SOLUTION INTRAVENOUS at 10:48

## 2019-07-04 RX ADMIN — SERTRALINE HYDROCHLORIDE 100 MG: 100 TABLET ORAL at 16:25

## 2019-07-04 RX ADMIN — SODIUM CHLORIDE 1000 ML: 9 INJECTION, SOLUTION INTRAVENOUS at 10:49

## 2019-07-04 RX ADMIN — METOPROLOL SUCCINATE 100 MG: 100 TABLET, EXTENDED RELEASE ORAL at 16:25

## 2019-07-04 RX ADMIN — ISOSORBIDE DINITRATE 10 MG: 10 TABLET ORAL at 21:47

## 2019-07-04 RX ADMIN — ISOSORBIDE DINITRATE 10 MG: 10 TABLET ORAL at 16:25

## 2019-07-04 RX ADMIN — LISINOPRIL 40 MG: 40 TABLET ORAL at 16:25

## 2019-07-04 RX ADMIN — MEROPENEM 1 G: 1 INJECTION, POWDER, FOR SOLUTION INTRAVENOUS at 18:12

## 2019-07-04 RX ADMIN — VANCOMYCIN HYDROCHLORIDE 125 MG: KIT at 21:48

## 2019-07-04 RX ADMIN — GABAPENTIN 800 MG: 400 CAPSULE ORAL at 16:25

## 2019-07-04 RX ADMIN — CLONIDINE HYDROCHLORIDE 0.1 MG: 0.1 TABLET ORAL at 16:25

## 2019-07-04 RX ADMIN — AMLODIPINE BESYLATE 10 MG: 10 TABLET ORAL at 16:25

## 2019-07-04 RX ADMIN — CLONIDINE HYDROCHLORIDE 0.1 MG: 0.1 TABLET ORAL at 21:48

## 2019-07-04 RX ADMIN — SODIUM CHLORIDE: 9 INJECTION, SOLUTION INTRAVENOUS at 14:36

## 2019-07-04 RX ADMIN — VANCOMYCIN HYDROCHLORIDE 125 MG: KIT at 18:13

## 2019-07-04 RX ADMIN — GABAPENTIN 1600 MG: 400 CAPSULE ORAL at 21:48

## 2019-07-04 ASSESSMENT — ENCOUNTER SYMPTOMS
DYSURIA: 1
FEVER: 0
FREQUENCY: 0

## 2019-07-04 ASSESSMENT — MIFFLIN-ST. JEOR: SCORE: 907.16

## 2019-07-04 NOTE — H&P
"[facility]    History and Physical  Hospitalist       Date of Admission:  7/4/2019    Assessment & Plan   Daya Ignacio is a 88 year old woman with past medical history significant for chronic back pain, 2 recent hospitalizations for ESBL UTI, acute GI bleed, and recent C. difficile treatment.  Patient was recently hospitalized 5/26 through 5/31 for ESBL UTI treated with Bactrim and again 6/10 through 6/16 for ESBL UTI (now resistant to Bactrim and treated with meropenem) and has C. difficile colitis treated with oral vancomycin.  Patient completed the oral vancomycin a few days ago.    Patient presented to emergency department today for evaluation of \"UTI symptoms.\"  Evaluation in the emergency department revealed urinalysis concerning for infection.  White blood count is unremarkable.  Urine culture and blood cultures x2 are pending.  Given patient's recent ESBL, she was started on meropenem in the emergency department.  CT scan completed in the emergency department did not reveal any significant urinary tract findings concerning for a nidus of infection.  Patient has been registered to observation status for further evaluation and treatment.    Urinary tract infection  Recent ESBL E. coli urinary tract infections  Urine and blood cultures were collected in the emergency department.  Patient was started on IV meropenem.  Will follow culture results and sensitivities and likely discuss further with infectious disease tomorrow.    Recent C. difficile infection  Patient completed C. difficile treatment with oral vancomycin in past 2 days.  Given need for recurrent systemic antibiotics, resuming vancomycin 125 mg 4 times daily for prophylaxis.  Will likely continue this for 2 weeks after final antibiotic treatment for UTI.    Left pelvic cyst  Noted incidentally on CT scan.  Radiologist recommends correlation with pelvic ultrasound given patient's postmenopausal age for further evaluation. No free " "fluid.  Patient can follow with primary care provider.    Coronary artery disease (history of an STEMI 5/2017)  Hypertension  History of congestive heart failure with preserved ejection fraction  History of moderate pulmonary hypertension  Continue PTA Imdur, metoprolol, lisinopril, and clonidine.  Patient does not appear volume overloaded.  She is not on any diuretics.  Patient is notably no longer on aspirin.  Patient can follow with primary care.    Depression  Continue PTA Zoloft    Neuropathy  Continue PTA gabapentin.    DVT Prophylaxis: Ambulate every shift  Code Status: DNR / DNI after discussion with patient. Patient states that these are her wishes, but that she's never been able to discuss this with children as \"they don't want to talk about it.\" Will encourage her to have this discussion.  Expected discharge: Anticipate hospitalization another 1 to 2 days pending urine culture information and need for IV antibiotics.    Arina Schafer MD FACP  Hospitalist Service  [facility]    Primary Care Physician   Marcy Soares    Chief Complaint   \"UTI symptoms\"    History is obtained from the patient.    History of Present Illness   Daya Ignacio is a 88 year old woman with past medical history significant for chronic back pain, 2 recent hospitalizations for ESBL UTI, acute GI bleed, and recent C. difficile treatment.  Patient was recently hospitalized 5/26 through 5/31 for ESBL UTI treated with Bactrim and again 6/10 through 6/16 for ESBL UTI (now resistant to Bactrim and treated with meropenem) and has C. difficile colitis treated with oral vancomycin.  Patient completed the oral vancomycin a few days ago.    Patient presented to emergency department today for evaluation of \"UTI symptoms.\"  Patient reported decreased appetite for the past 3 days.  Reports onset of nausea and one episode of emesis.  Endorses chills, but denies any fevers.  Endorses loose stool but states that they are not " "watery or \"like the C. difficile.\"  Reports that she started having \"UTI symptoms\" last night, describing \"lower belly pain,\" increased frequency of urination, and slight discomfort with urination.  Evaluation in the emergency department revealed urinalysis concerning for infection.  White blood count is unremarkable.  Urine culture and blood cultures x2 are pending.  Given patient's recent ESBL, she was started on meropenem in the emergency department.  Given her recurrent infections and no recent abdominal imaging, did request that CT scan be completed in the emergency department.  This did not reveal any significant urinary tract findings concerning for a nidus of infection.  Patient will be registered to observation status for further evaluation and treatment.    Past Medical History    I have reviewed this patient's medical history and updated it with pertinent information if needed.   Past Medical History:   Diagnosis Date     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 4/16/2014     Closed fracture of unspecified part of neck of femur      Closed fracture of unspecified part of vertebral column without mention of spinal cord injury     from fall summer 06     Clostridium difficile colitis 06/2019     Hypertension      Lumbar compression fracture (H)      Macular degeneration (senile) of retina, unspecified      Upper GI bleed 06/2019     Urinary tract infection due to extended-spectrum beta lactamase (ESBL)-producing Klebsiella 05/2019    resistant to Bactrim       Past Surgical History   I have reviewed this patient's surgical history and updated it with pertinent information if needed.  Past Surgical History:   Procedure Laterality Date     C NONSPECIFIC PROCEDURE      lt hip pain     ESOPHAGOSCOPY, GASTROSCOPY, DUODENOSCOPY (EGD), COMBINED N/A 6/11/2019    Procedure: ESOPHAGOGASTRODUODENOSCOPY (EGD);  Surgeon: Gaurav Degroot MD;  Location:  GI     ORTHOPEDIC SURGERY         Prior to Admission " Medications   Prior to Admission Medications   Prescriptions Last Dose Informant Patient Reported? Taking?   Calcium Carbonate (CALCIUM 600 PO) Past Week at Unknown time  Yes Yes   Sig: Take 1 tablet by mouth 2 times daily    Cholecalciferol (VITAMIN D3) 2000 UNITS CAPS 7/3/2019 at am  Yes Yes   Sig: Take 2,000 Units by mouth daily (with dinner)   Multiple Vitamins-Minerals (PRESERVISION AREDS) CAPS 7/3/2019 at am  Yes Yes   Sig: Take 1 capsule by mouth daily    amLODIPine (NORVASC) 10 MG tablet 7/3/2019 at am  No Yes   Sig: Take 1 tablet (10 mg) by mouth daily   cloNIDine (CATAPRES) 0.1 MG tablet 7/3/2019 at pm  No Yes   Sig: TAKE ONE TABLET BY MOUTH TWO TIMES A DAY   estradiol (ESTRACE) 0.1 MG/GM vaginal cream 7/3/2019 at Unknown time  No Yes   Sig: Place 2 g vaginally three times a week paraben-free   fish oil-omega-3 fatty acids 1000 MG capsule Past Month at Unknown time  Yes Yes   Sig: Take 1 g by mouth daily   gabapentin (NEURONTIN) 800 MG tablet 7/3/2019 at afternoon  Yes Yes   Sig: Take 800 mg by mouth 2 times daily In AM and afternoon   gabapentin (NEURONTIN) 800 MG tablet 7/3/2019 at hs  Yes Yes   Sig: Take 1,600 mg by mouth At Bedtime   ibuprofen (ADVIL/MOTRIN) 200 MG tablet 7/3/2019 at pm  Yes Yes   Sig: Take 400 mg by mouth every 4 hours as needed for mild pain   isosorbide dinitrate (ISORDIL) 10 MG tablet 7/3/2019 at pm  No Yes   Sig: Take 1 tablet (10 mg) by mouth 3 times daily   lisinopril (PRINIVIL/ZESTRIL) 40 MG tablet 7/3/2019 at am  No Yes   Sig: Take 1 tablet (40 mg) by mouth daily   metoprolol succinate ER (TOPROL-XL) 100 MG 24 hr tablet 7/3/2019 at am  No Yes   Sig: Take 1 tablet (100 mg) by mouth daily   sertraline (ZOLOFT) 100 MG tablet 7/3/2019 at am  No Yes   Sig: Take 1 tablet (100 mg) by mouth daily   vancomycin (VANCOCIN) 125 MG capsule   No No   Sig: Take 1 capsule (125 mg) by mouth 4 times daily for 14 days   vitamin C (ASCORBIC ACID) 500 MG tablet 7/3/2019 at am  Yes Yes   Sig: Take  "500 mg by mouth daily      Facility-Administered Medications: None     Allergies   Allergies   Allergen Reactions     Atorvastatin Muscle Pain (Myalgia)     Macrobid [Nitrofurantoin Anhydrous] Other (See Comments)     Body aches     Sulfa Drugs        Social History   Patient resides in a senior independent living facility.  She states that she smoked in college, but not since.  Rare alcohol use and denies illicit drugs.  States that her son, Gael Ignacio, is her medical power of .    Family History   Patient reports that her father  at age 65.  He was a tobacco user and had a heart attack.  Patient's mother  in her 90s and had Alzheimer's disease.    Review of Systems   Patient reported decreased appetite for the past 3 days.  Reports onset of nausea and one episode of emesis.  Endorses chills, but denies any fevers.  Occasional headaches, but states that these are not unusual.  Endorses loose stool but states that it is not watery.  Not the same as when she had C. difficile.  Reports that she started having \"UTI symptoms\"  last night describing \"lower belly pain,\" increased frequency of urination, and slight discomfort with urination.  Was otherwise noted above remainder of review of systems negative.    Physical Exam   Temp: 97.7  F (36.5  C) Temp src: Oral BP: 169/72 Pulse: 78   Resp: 16 SpO2: 98 % O2 Device: None (Room air)    Vital Signs with Ranges  Temp:  [97.7  F (36.5  C)-98.6  F (37  C)] 97.7  F (36.5  C)  Pulse:  [69-86] 78  Resp:  [16-18] 16  BP: (157-203)/(67-88) 169/72  SpO2:  [91 %-100 %] 98 %  119 lbs 0 oz    Constitutional: Alert and oriented x3. No acute distress.  Elderly woman who actually appears much younger than her age.  Pleasant.  Nontoxic.  No diaphoresis.  HEENT: NCAT. EOMI. Moist oral mucosa.  Respiratory: Clear to auscultation bilaterally. No crackles or wheezes.  Cardiovascular: Regular rate and rhythm. No murmur.  GI: Patient has suprapubic tenderness, but otherwise " nontender.  Soft nondistended.  Normoactive bowel sounds.    Musculoskeletal: No gross deformities.  No peripheral edema.  Neurologic: Alert and oriented x3. No focal neurologic deficits.     Data   Data reviewed today:  I personally reviewed patient's labs and CT scan.    Recent Labs   Lab 07/04/19  0815   WBC 7.5   HGB 12.5   MCV 92         POTASSIUM 3.6   CHLORIDE 100   CO2 24   BUN 10   CR 0.43*   ANIONGAP 9   DAVE 9.4   *       Recent Results (from the past 24 hour(s))   Abd/pelvis CT no contrast - Stone Protocol    Narrative    CT ABDOMEN PELVIS WITHOUT CONTRAST 7/4/2019 10:38 AM    TECHNIQUE: Images from diaphragm to pubic symphysis without IV  contrast.  Radiation dose for this scan was reduced using automated exposure  control, adjustment of the mA and/or kV according to patient size, or  iterative reconstruction technique.    HISTORY: Multiple ESBL urinary tract infection, evaluate for anatomic  abnormality.    COMPARISON: None.    FINDINGS:   Abdomen and Pelvis: No evidence for hydronephrosis or urinary tract  calculi. The bladder is unremarkable where visualized but partially  obscured by streak artifact from bilateral proximal femur surgery.    There is a 3.5 cm left pelvic cyst on series 2 image 45, indeterminate  but suspected to be of ovarian origin. Recommend correlation with  pelvic ultrasound given the patient's postmenopausal age for further  evaluation. No free fluid.    Lung bases clear. Elevated right hemidiaphragm. Within the limits of a  noncontrast exam the liver, spleen, pancreas, adrenal glands and  kidneys demonstrate no acute or worrisome abnormality. Tiny  gallstones. Dense atherosclerotic vascular calcifications without  aortic aneurysm. No periaortic or pelvic adenopathy. No  acute-appearing bowel abnormality demonstrated. Appendix is not  visualized, consequently not evaluated.    Multiple lower thoracic and lumbar vertebral compressions and old  right pubic bone  fracture. Left femoral neck pinning and right total  hip arthroplasty.      Impression    IMPRESSION:   1. No acute-appearing abnormality.  2. 3.5 cm left pelvic cyst which would be consistent with an ovarian  cystic lesion. Follow-up pelvic ultrasound for further evaluation  recommended.  3. Small gallstones, no gallbladder distention or evidence for  gallbladder wall thickening.  4. No evidence for hydronephrosis or urinary tract calculi.                 ALIS CONNER MD

## 2019-07-04 NOTE — PROGRESS NOTES
ROOM # 232    Living Situation (if not independent, order SW consult): Lives independent in an apartment (Senior living facility).   Facility name:  : [facility] [phone]    Activity level at baseline: Walker   Activity level on admit: 1 assist with a walker       Patient registered to observation; given Patient Bill of Rights; given the opportunity to ask questions about observation status and their plan of care.  Patient has been oriented to the observation room, bathroom and call light is in place.    Discussed discharge goals and expectations with patient/family.

## 2019-07-04 NOTE — ED NOTES
[facility]  ED Nurse Handoff Report    Daya Ignacio is a 88 year old female   ED Chief complaint: Rule out Urinary Tract Infection  . ED Diagnosis:   Final diagnoses:   UTI due to extended-spectrum beta lactamase (ESBL) producing Escherichia coli   Generalized muscle weakness   C. difficile colitis     Allergies:   Allergies   Allergen Reactions     Atorvastatin Muscle Pain (Myalgia)     Macrobid [Nitrofurantoin Anhydrous] Other (See Comments)     Body aches     Sulfa Drugs        Code Status: Full Code  Activity level - Baseline/Home:  Independent. Activity Level - Current:   Stand by Assist. Lift room needed: No. Bariatric: No   Needed: No   Isolation: Yes. Infection: Not Applicable  C-Diff (Clostridium Difficile) diagnosis.     Vital Signs:   Vitals:    07/04/19 0915 07/04/19 0930 07/04/19 0945 07/04/19 1000   BP: 157/68 160/73 171/67 193/81   Pulse: 76 75 71 70   Resp:       Temp:       TempSrc:       SpO2: 96% 98% 97% 98%       Cardiac Rhythm:  ,      Pain level:    Patient confused: No. Patient Falls Risk: Yes.   Elimination Status: Has voided   Patient Report - Initial Complaint: UTI. Focused Assessment: generalized weakness, anorexia,  Groin pain, nausea.   Tests Performed: labs. Abnormal Results:   Labs Ordered and Resulted from Time of ED Arrival Up to the Time of Departure from the ED   CBC WITH PLATELETS DIFFERENTIAL - Abnormal; Notable for the following components:       Result Value    RDW 16.6 (*)     All other components within normal limits   BASIC METABOLIC PANEL - Abnormal; Notable for the following components:    Glucose 102 (*)     Creatinine 0.43 (*)     All other components within normal limits   ROUTINE UA WITH MICROSCOPIC - Abnormal; Notable for the following components:    Ketones Urine 20 (*)     Blood Urine Trace (*)     Protein Albumin Urine 50 (*)     Nitrite Urine Positive (*)     Leukocyte Esterase Urine Moderate (*)     WBC Urine 51 (*)     Bacteria Urine  Many (*)     Mucous Urine Present (*)     All other components within normal limits   URINE CULTURE AEROBIC BACTERIAL   BLOOD CULTURE   BLOOD CULTURE     .   Treatments provided: MAR  Family Comments: present and supportive  OBS brochure/video discussed/provided to patient:  Yes  ED Medications:   Medications   meropenem (MERREM) 1 g vial to attach to  mL bag (1 g Intravenous New Bag 7/4/19 1048)   0.9% sodium chloride BOLUS (1,000 mLs Intravenous New Bag 7/4/19 1049)     Followed by   sodium chloride 0.9% infusion (has no administration in time range)     Drips infusing:  Yes  For the majority of the shift, the patient's behavior Green. Interventions performed were .     Severe Sepsis OR Septic Shock Diagnosis Present: No      ED Nurse Name/Phone Number: Erinn Coppola,   11:02 AM    RECEIVING UNIT ED HANDOFF REVIEW    Above ED Nurse Handoff Report was reviewed: Yes  Reviewed by: Krystal Tolbert on July 4, 2019 at 11:54 AM        DISPLAY PLAN FREE TEXT

## 2019-07-04 NOTE — ED NOTES
Bed: ED11  Expected date:   Expected time:   Means of arrival:   Comments:  Anju 514: 88F urinary symptoms

## 2019-07-04 NOTE — ED NOTES
We had the pleasure of seeing your patient, David Beaulieu, at the [facility] Childhood Cancer Survivorship [facility].  David is a now 18-year-old  male with a history of medulloblastoma that was diagnosed on 02/07/2008 at 9 years of age.  His tumor was located in his posterior fossa.  He was treated at the [facility] under the direction of Dr. Grant Rosenbaum.  David had initial surgical resection of his tumor which was as follows:   1.  Suboccipital craniotomy and near total tumor resection on 02/07/2008 with Dr. Wilber Valenzuela at the [facility].        David was treated as per the [facility] protocol [id number] and received the following chemotherapy:   1.  Vincristine intravenously.   2.  Cis-retinoic acid orally.   3.  Cisplatin intravenously with cumulative dose of 394 mg/meter squared.   4.  Cyclophosphamide intravenously with a cumulative dose of 12 grams/meter squared.      David also received radiation therapy as part of his treatment which is as follows:   1.  Whole brain radiation which started on 03/04/2008 and was completed on 03/31/2008 in 20 fractions, for a total dose of 3600 centigray.   2.  Spine radiation which started on 03/04/2008 was completed on 03/31/2008 in 20 fractions, for a total dose of 3600 centigray.   3.  Posterior fossa boost, which started on 04/01/2008 and was completed on 04/15/2008 in 11 fractions, for a total of 1972 centigray.   4.  Total dose to posterior fossa is 5572 centigray.      David's acute and chronic late effects known to date include:   1.  Severe bilateral sensorineural hearing loss diagnosed on 08/21/2008.   2.  Growth deceleration found on 09/22/2009.   3.  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   4.  Lower extremity weakness diagnosed on 11/03/2009, for which he started physical therapy.   5.  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   6.  Problems with  "Pt ambulated approximately 40 meters with assist of 1. Gait was steady. Pt did state that they had dizziness, lightheadedness, and weakness while walking. Pt stated that they \"did not feel safe going home\". MD notified.  " "executive functioning diagnosed on 10/25/2010.   7.  Slow processing speed diagnosed on 10/25/2010.   8.  Problems with fine motor skills diagnosed on 10/25/2010.   9.  Reduced spinal growth diagnosed on 07/24/2012.   10.  Problems with sustained attention diagnosed on 08/17/2012.   11.  Problems with emotional lability.   12.  Depression which was diagnosed on 12/03/2014.   13.  Left ventricular dysfunction diagnosed 9/2015       HISTORY OF PRESENT ILLNESS:  David reports to his long term follow up evaluation with his father.  He has been doing fairly well.  The issues with his endurance have improved.  He did not see local cardiology last year.  He was last seen here in 2015.  He reports that he had local PFTs that were at the lower limits of normal.  He saw local pulmonary and there were no concerns.  He did see ENT for his throat issues.  He was prescribed a \"pill\" and \"inhaler\" which improved his symptoms.   He also saw Pediatric Endocrinology today.  He is being transitioned to adult endo next year.  With regards to his attention, he is still taking Concerta.   He does have accommodations in school to get extra time for testing and preferred seating.  He had neuropsych testing done in 12/2014.  David denies any headaches, vision changes.   He denies any seizure-like activity or tremors, denied any back pain, no fever.   With regards to his hearing, he received hearing aids in the last year.  He had his hearing checked locally recently so appt wasn't needed here today.  Sleep and appetite good.  No skin changes.     REVIEW OF SYSTEMS:  Comprehensive review of systems was negative except as started above.      CURRENT MEDICATIONS:   Current Outpatient Prescriptions   Medication     levothyroxine (SYNTHROID, LEVOTHROID) 125 MCG tablet     fluticasone (FLONASE) 50 MCG/ACT nasal spray     methylphenidate (CONCERTA) 18 MG CR tablet     ibuprofen (ADVIL,MOTRIN) 200 MG tablet     Acetaminophen (TYLENOL CHILDRENS PO) " "    Current Facility-Administered Medications   Medication     influenza quadrivalent (PF) vacc age 3 yrs and older (FLUZONE or Flulaval) injection 0.5 mL          ALLERGIES:  Amoxicillin.      FAMILY HISTORY:     Family History   Problem Relation Age of Onset     CEREBROVASCULAR DISEASE Mother      Childhood Heart Surgery Father      PDA s/p repair     Hypertension Father    No change         SOCIAL HISTORY:  David is in his 1st year of college at [employer] in [place].  He has accommodations for preferrential seating and extra time for tests.  He lives in [place].   Met with social work today for routine assessment.     PHYSICAL EXAMINATION:     VITAL SIGNS:   /82 (BP Location: Right arm, Patient Position: Fowlers, Cuff Size: Adult Regular)  Pulse 87  Temp 97.6  F (36.4  C) (Oral)  Resp 18  Ht 1.511 m (4' 11.49\")  Wt 63.1 kg (139 lb 1.8 oz)  SpO2 98%  BMI 27.64 kg/m2    Wt Readings from Last 3 Encounters:   09/19/17 63.1 kg (139 lb 1.8 oz) (28 %)*   09/19/17 63.1 kg (139 lb 1.8 oz) (28 %)*   09/20/16 61.5 kg (135 lb 9.3 oz) (29 %)*     * Growth percentiles are based on CDC 2-20 Years data.     Ht Readings from Last 2 Encounters:   09/19/17 1.511 m (4' 11.49\") (<1 %)*   09/19/17 1.514 m (4' 11.61\") (<1 %)*     * Growth percentiles are based on CDC 2-20 Years data.     91 %ile based on CDC 2-20 Years BMI-for-age data using vitals from 9/19/2017.      GENERAL:  David appears to be in no acute distress.  He is alert and oriented x3 and well-nourished.   HEENT:  David is microcephalic, most notably in his posterior fossa region.  Bilateral tympanic membranes are within normal limits.  Pupils are equally round and reactive to light and accommodation.  Extraocular movements are intact.  Oropharynx is clear without lesions.   NECK:  Supple.  Thyroid is nonpalpable.  There is a well-healed occipital surgical scar.   CARDIOVASCULAR:  Regular rate and rhythm with no murmurs, rubs or gallops.  " Intact distal pulses.   RESPIRATORY:  Breath sounds are clear to auscultation bilaterally with normal effort and no distress.  No wheezes, crackles or rales were auscultated.   ABDOMEN:  Soft, nondistended, nontender, with bowel sounds in all 4 quadrants, no palpable masses or tenderness.   MUSCULOSKELETAL:  Normal range of motion. Muscular hypoplasia to paraspinous muscles - stable  LYMPHATIC:  Patient has no lymphadenopathy.   NEUROLOGIC:  David is alert and oriented x3, has 2-3+ lower bilateral reflexes.  He has no cranial nerve deficits and has normal muscle tone.  GCS score is 15.   SKIN:  Skin is warm and dry with no rashes or erythema.   PSYCHIATRIC:  Mood and affect are within normal limits.      LABORATORY STUDIES:   Results for orders placed or performed in visit on 09/19/17   TSH   Result Value Ref Range    TSH 0.06 (L) 0.40 - 4.00 mU/L   T4 free   Result Value Ref Range    T4 Free 1.37 0.76 - 1.46 ng/dL   Comprehensive metabolic panel   Result Value Ref Range    Sodium 137 133 - 144 mmol/L    Potassium 3.4 3.4 - 5.3 mmol/L    Chloride 103 98 - 110 mmol/L    Carbon Dioxide 28 20 - 32 mmol/L    Anion Gap 6 3 - 14 mmol/L    Glucose 76 70 - 99 mg/dL    Urea Nitrogen 11 7 - 21 mg/dL    Creatinine 0.64 0.50 - 1.00 mg/dL    GFR Estimate >90 >60 mL/min/1.7m2    GFR Estimate If Black >90 >60 mL/min/1.7m2    Calcium 9.6 9.1 - 10.3 mg/dL    Bilirubin Total 0.2 0.2 - 1.3 mg/dL    Albumin 3.9 3.4 - 5.0 g/dL    Protein Total 8.0 6.8 - 8.8 g/dL    Alkaline Phosphatase 96 65 - 260 U/L    ALT 29 0 - 50 U/L    AST 13 0 - 35 U/L   FSH   Result Value Ref Range    FSH 7.8 0.7 - 10.8 IU/L   CBC with platelets differential   Result Value Ref Range    WBC 7.0 4.0 - 11.0 10e9/L    RBC Count 5.14 4.4 - 5.9 10e12/L    Hemoglobin 14.2 13.3 - 17.7 g/dL    Hematocrit 41.8 40.0 - 53.0 %    MCV 81 78 - 100 fl    MCH 27.6 26.5 - 33.0 pg    MCHC 34.0 31.5 - 36.5 g/dL    RDW 12.6 10.0 - 15.0 %    Platelet Count 299 150 - 450 10e9/L     Diff Method Automated Method     % Neutrophils 54.6 %    % Lymphocytes 37.7 %    % Monocytes 4.6 %    % Eosinophils 2.4 %    % Basophils 0.4 %    % Immature Granulocytes 0.3 %    Nucleated RBCs 0 0 /100    Absolute Neutrophil 3.8 1.6 - 8.3 10e9/L    Absolute Lymphocytes 2.6 0.8 - 5.3 10e9/L    Absolute Monocytes 0.3 0.0 - 1.3 10e9/L    Absolute Eosinophils 0.2 0.0 - 0.7 10e9/L    Absolute Basophils 0.0 0.0 - 0.2 10e9/L    Abs Immature Granulocytes 0.0 0 - 0.4 10e9/L    Absolute Nucleated RBC 0.0    Routine UA with micro reflex to culture   Result Value Ref Range    Color Urine Yellow     Appearance Urine Clear     Glucose Urine Negative NEG^Negative mg/dL    Bilirubin Urine Negative NEG^Negative    Ketones Urine Negative NEG^Negative mg/dL    Specific Gravity Urine 1.038 (H) 1.003 - 1.035    Blood Urine Negative NEG^Negative    pH Urine 6.0 5.0 - 7.0 pH    Protein Albumin Urine 10 (A) NEG^Negative mg/dL    Urobilinogen mg/dL Normal 0.0 - 2.0 mg/dL    Nitrite Urine Negative NEG^Negative    Leukocyte Esterase Urine Negative NEG^Negative    Source Midstream Urine     WBC Urine <1 0 - 2 /HPF    RBC Urine <1 0 - 2 /HPF    Mucous Urine Present (A) NEG^Negative /LPF          DIAGNOSTIC IMAGING:     Preliminary MRI report; stable when compared with prior year.  MR BRAIN W/O & W CONTRAST 9/19/2017 11:36 AM     History: Medulloblastoma diagnosed on 02/07/2008 at 9 years of age.  ?Initial surgical resection of his tumor with suboccipital craniotomy  and near total tumor resection on 02/07/2008.  ??  Patient was treated with subsequent chemo therapy and radiation  therapy.     ??  Comparison: Head MRI 9/20/2016, 9/30/2015 and 2/8/2008     Technique: Multiplanar T1-weighted, axial FLAIR, and susceptibility  images were obtained without intravenous contrast. Following  intravenous gadolinium-based contrast administration, axial  T2-weighted, diffusion, and T1-weighted images (in multiple planes)  were obtained.     Contrast dose:  6ml iv gadavist     Findings: Postsurgical changes of suboccipital craniotomy and  resection of fourth ventricle medulloblastoma. Hemosiderin staining  within the surgical cavity. Unchanged appearance of the resection  cavity with accompanying atrophic changes of the cerebellar vermis and  right cerebellar hemisphere likely secondary to a combination of  radiotherapy and surgery. On postcontrast series there is no enhancing  lesion to suggest residual lesion or recurrence. No abnormal  restricted diffusion within the surgical site to suggest tumor  recurrence. On susceptibility weighted imaging, there are stable  scattered punctate foci of susceptibility artifact throughout the  subcortical and periventricular white matter. These may represent  chronic microhemorrhages versus post radiation cavernomas.     Compared to previous MRI exams, there is no intracranial hemorrhage  mass effect or new enhancing lesion. Ventricular system appears stable  in size. Patent major flow voids. Venous sinuses appear normal.  Orbital structures are grossly unremarkable.     Again noted subtle increased T1 signal on noncontrast T1-weighted  images within the left dentate nucleus which may represent  accumulation of gadolinium over the years.         Impression:  1. Postsurgical changes of posterior fossa medulloblastoma resection  without any evidence of tumor recurrence.     2. Unchanged scattered foci of susceptibility artifact consistent with  either chronic microhemorrhages versus postradiation cavernomas.     3. Unchanged T1 signal in the basal ganglia and left dentate nucleus  which may represent accumulated gadolinium.     I have personally reviewed the examination and initial interpretation  and I agree with the findings.     FREDERICK SOTO MD    Audiogram: done locally        ASSESSMENT AND PLAN:  David Beaulieu is a now 18 year-old  male with a history of medulloblastoma that completed treatment in 04/2009 with  cis-retinoic acid.  He has been off therapy for 8 years.  He has some late effects in the area of endocrinopathies as well as neurocognitive likely due to his radiation therapy.  He also has hearing loss from his cisplatin treatment.   He continues to have stable radiation related changes on his MRI.   The following are our long term recommendations:     1.  Risk of recurrence:  David is currently 8 years from the end of treatment for his medulloblastoma.  Given the distance from the diagnosis of his primary malignancy, the likelihood of recurrence continues to decline.  We would recommend that he continue to have yearly assessment based on his previous history as well as radiation with MRIs yearly.  We will order this to occur in 12 months to coincide with his next Endocrinology appointment. After he is 10 years off therapy, we will discuss changing the MRI frequency to every other year.  Currently we are monitoring his radiation changes.    2.  Psychosocial effects:  David had previous neuropsych testing and was found to have problems with sustained attention, processing speed and some motor skills.  He has accommodations in college.  If he notices any significant changes in cognition, we would recommend repeat testing.   3.  Risk for cardiac toxicity:  David has a history of spine radiation, which puts him at risk for developing cardiomyopathy and valvular dysfunction.  We recommend he have an echocardiogram every 5 years.  Baseline study in 2015 showed decreased LVEF to 47% in 4 chamber and 52% biplane; also small PFO with L to R shunting.  He saw peds cardiology in 2015 but family would like to follow up locally.  Unfortunately this did not occur last year.  We will plan this year to get an echocardiogram done locally (order faxed to [facility] radiology dept in [place]).  Once I receive the results back I will contact them for further guidance.  If normal, we will just check again in 1 year.  If  low, we would ask that they establish care with adult cardiology now that David is 18.   There is also increasing evidence regarding metabolic syndrome in cancer survivors.  We would recommend that David have fasting lipids followed at least every 2-3 years.  Last done in 2015, so they should be repeated next year.  4.  Hearing loss:  David has a history of sensorineural hearing loss likely related to his posterior fossa radiation and cisplatin treatment.  He has hearing aids now and should continue his exams locally.  5.  Risk for peripheral neuropathy:  David has a history of vincristine exposure, which does put him at increased risk for peripheral motor neuropathy.  He does have some previous issues with fine motor skills, but these appear to be improved.   6.  Endocrinopathies:  David has a history of growth hormone deficiency and hypothyroidism, which are both likely from his radiation treatment.  He should have regular followup with Endocrinology as directed.   7.  Risk for secondary neoplasms/vascular changes:  David has a history of radiation, which does put him at increased risk for secondary neoplasms.  I counseled him that should he notice any changes in his cognitive status, any new lumps, bumps, rashes that they should be promptly evaluated given his history of radiation.  He will have an MRI done in 12 months to evaluate post his whole brain radiation.  I did discuss with his father that we would stop doing spine examinations at this point and solely do brain MRI.   He had a new likely radiation induced blooming artifact that we will continue to follow.  I did also discuss the risk for stroke post radiation and reviewed symptoms.  Mom does have a history of CVA and dad is unsure if she has any underlying disorder that caused this.  CBC today is normal.  8.  Musculoskeletal:  David has some musculoskeletal hypoplasia in the way of his microcephaly as well as reduced spine growth likely from his  radiation.  He currently has no back pain or concerns.   9.  Risk for kidney toxicity:  David has a history of cisplatin and cyclophosphamide treatment, which can cause kidney and bladder dysfunction.  David's baseline CMP and UA were within normal limits.  We recommend that he have yearly assessments of his blood pressure and urinalysis.    10.  Throat issues:  Resolved and pt saw ENT last year.       It was a pleasure seeing David in our Cancer Survivorship Program.  We appreciate the opportunity to participate in your patient's care.  If you have any questions or concerns, please do not hesitate to contact us at [phone].   We ask that David return in 12 months with an MRI brain.  He will get his next audiogram locally.     We will try to coordinate with endocrine.   We requested an echo locally and they will connect with the family to schedule.  I will contact them once I have results.          Erica Knight MSN, APRN, CPNP-AC, CPON  Department of Pediatrics  Division of Hematology/Oncology

## 2019-07-04 NOTE — ED TRIAGE NOTES
Pt presents via EMS for having c/o groin pain, weakness, nausea and emesis X1 last night. Pt states she's had lack of appetite. All sx started yesterday. Pt is A&O, ABC's intact.

## 2019-07-04 NOTE — PHARMACY-ADMISSION MEDICATION HISTORY
Admission medication history interview status for this patient is complete. See [facility] admission navigator for allergy information, prior to admission medications and immunization status.     Medication history interview source(s):Patient and son  Medication history resources (including written lists, pill bottles, clinic record):[facility] clinic list, [facility], [facility]  [facility]    Changes made to PTA medication list:  Added: fish oil, ibuprofen  Deleted: tylenol prn, oxycodone 2.5mg bid prn  Changed: preservision from bid to daily    Actions taken by pharmacist (provider contacted, etc):None     Additional medication history information:None    Medication reconciliation/reorder completed by provider prior to medication history? No      For patients on insulin therapy:N(Y/N)      Prior to Admission medications    Medication Sig Last Dose Taking? Auth Provider   amLODIPine (NORVASC) 10 MG tablet Take 1 tablet (10 mg) by mouth daily 7/3/2019 at am Yes Marcy Soares PA-C   Calcium Carbonate (CALCIUM 600 PO) Take 1 tablet by mouth 2 times daily  Past Week at Unknown time Yes Reported, Patient   Cholecalciferol (VITAMIN D3) 2000 UNITS CAPS Take 2,000 Units by mouth daily (with dinner) 7/3/2019 at am Yes Unknown, Entered By History   cloNIDine (CATAPRES) 0.1 MG tablet TAKE ONE TABLET BY MOUTH TWO TIMES A DAY 7/3/2019 at pm Yes Marcy Soares PA-C   estradiol (ESTRACE) 0.1 MG/GM vaginal cream Place 2 g vaginally three times a week paraben-free 7/3/2019 at Unknown time Yes Marcy Soares PA-C   fish oil-omega-3 fatty acids 1000 MG capsule Take 1 g by mouth daily Past Month at Unknown time Yes Unknown, Entered By History   gabapentin (NEURONTIN) 800 MG tablet Take 800 mg by mouth 2 times daily In AM and afternoon 7/3/2019 at afternoon Yes Reported, Patient   gabapentin (NEURONTIN) 800 MG tablet Take 1,600 mg by mouth At Bedtime 7/3/2019 at hs Yes Reported, Patient    ibuprofen (ADVIL/MOTRIN) 200 MG tablet Take 400 mg by mouth every 4 hours as needed for mild pain 7/3/2019 at pm Yes Unknown, Entered By History   isosorbide dinitrate (ISORDIL) 10 MG tablet Take 1 tablet (10 mg) by mouth 3 times daily 7/3/2019 at pm Yes Marcy Soares PA-C   lisinopril (PRINIVIL/ZESTRIL) 40 MG tablet Take 1 tablet (40 mg) by mouth daily 7/3/2019 at am Yes Marcy Soares PA-C   metoprolol succinate ER (TOPROL-XL) 100 MG 24 hr tablet Take 1 tablet (100 mg) by mouth daily 7/3/2019 at am Yes Marcy Soares PA-C   Multiple Vitamins-Minerals (PRESERVISION AREDS) CAPS Take 1 capsule by mouth daily  7/3/2019 at am Yes Unknown, Entered By History   sertraline (ZOLOFT) 100 MG tablet Take 1 tablet (100 mg) by mouth daily 7/3/2019 at am Yes Marcy Soares PA-C   vitamin C (ASCORBIC ACID) 500 MG tablet Take 500 mg by mouth daily 7/3/2019 at am Yes Reported, Patient

## 2019-07-04 NOTE — ED PROVIDER NOTES
History     Chief Complaint:  Rule out Urinary Tract Infection      HPI   Daya Ignacio is a 88 year old female with history of C. Diff and ESBL UTI who presents to the emergency department today for evaluation of UTI. The patient was recently admitted on 6/22/19-6/23/19 for acute on chronic back pain with FRH and recent bouts of acute GI Bleed, ESBL UTI, and C Diff. She reports for the past couple of days she lost her appetite secondary to nausea, and yesterday she developed symptoms of dysuria and perineal pain. She has no associated abdominal or back pain. She says she has had more loose stool, but there is still some consistency. She isn't currently on antibiotics, and isn't being seen by Infectious Disease. Due to these symptoms she called EMS and presented to the emergency department today. Additionally, she reports this feels similar to her other UTI's in the past. She denies frequency, and fever.      Allergies:  Atorvastatin  Macrobid [Nitrofurantoin Anhydrous]  Sulfa Drugs        Medications:    acetaminophen (TYLENOL) 500 MG tablet  amLODIPine (NORVASC) 10 MG tablet  Calcium Carbonate (CALCIUM 600 PO)  Cholecalciferol (VITAMIN D3) 2000 UNITS CAPS  cloNIDine (CATAPRES) 0.1 MG tablet  estradiol (ESTRACE) 0.1 MG/GM vaginal cream  gabapentin (NEURONTIN) 800 MG tablet  isosorbide dinitrate (ISORDIL) 10 MG tablet  lisinopril (PRINIVIL/ZESTRIL) 40 MG tablet  metoprolol succinate ER (TOPROL-XL) 100 MG 24 hr tablet  Multiple Vitamins-Minerals (PRESERVISION AREDS) CAPS  oxyCODONE (ROXICODONE) 5 MG tablet  sertraline (ZOLOFT) 100 MG tablet  vitamin C (ASCORBIC ACID) 500 MG tablet      Past Medical History:    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  Closed fracture of unspecified part of neck of femur  Closed fracture of unspecified part of vertebral column without mention of spinal cord injury  C. Diff  Hypertension  Lumbar compression fracture  Macular degeneration of retina  Upper GI bleed  ESBL  UTI      Past Surgical History:    Left Hip pain C nonspecific procedure  EGD  Orthopedic surgery      Family History:    Alzheimer disease  MI  Parkinson's disease      Social History:  The patient was accompanied to the ED by EMS.  Smoking Status: Never Smoker  Smokeless Tobacco: Never Used  Alcohol Use: No   Marital Status:  Single [1]       Review of Systems   Constitutional: Negative for fever.   Genitourinary: Positive for dysuria. Negative for frequency.        Perineal pain     All other systems reviewed and are negative.        Physical Exam     Patient Vitals for the past 24 hrs:   BP Temp Temp src Pulse Resp SpO2   07/04/19 1000 193/81 -- -- 70 -- 98 %   07/04/19 0945 171/67 -- -- 71 -- 97 %   07/04/19 0930 160/73 -- -- 75 -- 98 %   07/04/19 0915 157/68 -- -- 76 -- 96 %   07/04/19 0900 176/79 -- -- 74 -- 97 %   07/04/19 0845 179/75 -- -- 74 -- 95 %   07/04/19 0830 178/75 -- -- 73 -- 91 %   07/04/19 0815 186/78 -- -- 78 -- 96 %   07/04/19 0800 196/77 -- -- 74 -- 93 %   07/04/19 0745 197/77 -- -- 69 -- 99 %   07/04/19 0742 186/88 98.6  F (37  C) Oral 72 18 98 %        Physical Exam  General: The patient is alert, in no respiratory distress.    HENT: Mucous membranes moist.    Cardiovascular: Regular rate and rhythm. Good pulses in all four extremities. Normal capillary refill and skin turgor.     Respiratory: Lungs are clear. No nasal flaring. No retractions. No wheezing, no crackles.    Gastrointestinal: No abdominal or flank tenderness. Abdomen soft. No guarding, no rebound. No palpable hernias.     Musculoskeletal: No gross deformity.     Skin: No rashes or petechiae.     Neurologic: The patient is alert and oriented x3. GCS 15. No testable cranial nerve deficit. Follows commands with clear and appropriate speech. Gives appropriate answers. Good strength in all extremities. No gross neurologic deficit. Gross sensation intact. Pupils are round and reactive. No meningismus.     Lymphatic: No cervical  adenopathy. No lower extremity swelling.    Psychiatric: The patient is non-tearful.     Emergency Department Course     Imaging:  Radiology findings were communicated with the patient and family who voiced understanding of the findings.    CT Abdomen w/o Contrast:  IMPRESSION:   1. No acute-appearing abnormality.  2. 3.5 cm left pelvic cyst which would be consistent with an ovarian  cystic lesion. Follow-up pelvic ultrasound for further evaluation  recommended.  3. Small gallstones, no gallbladder distention or evidence for  gallbladder wall thickening.  4. No evidence for hydronephrosis or urinary tract calculi.  Report per radiology       Laboratory:  Laboratory findings were communicated with the patient who voiced understanding of the findings.    CBC: WBC 7.5, HGB 12.5,    BMP: Glucose 102 (H), Creatinine 0.43 (L) o/w WNL      UA: Light Yellow and Slightly Cloudy. Urineketon 20, Urine Blood Trace, Protein Albumin Urine 50, Nitrite Positive, Leukocyte Esterase Urine moderate, WBC/HPF 51 (H), Bacteria Many, Mucous Urine Present o/w WNL      Urine Culture Aerobic Bacterial: Pending       Interventions:  1048 Merrem 1 g IV  1049 NS Bolus 1,000mL IV        Emergency Department Course:  Nursing notes and vitals reviewed.  0749: I performed an exam of the patient as documented above.     IV was inserted and blood was drawn for laboratory testing, results above.    The patient provided a urine sample here in the emergency department. This was sent for laboratory testing, findings above.     0943 Patient rechecked and updated.       Per nurse report patient was dizzy when she walked.     1003 I spoke with Dr. Carpenter of the Hospitalist service regarding patient's presentation, findings, and plan of care.    The patient was sent for a CT Abdomen while in the emergency department, results above.      1008 Patient rechecked and updated.      1114 I spoke with Dr. Carpenter of the Hospitalist service regarding  patient's presentation, findings, and plan of care.     I discussed the treatment plan with the patient. They expressed understanding of this plan and consented to admission. I discussed the patient with Dr. Carpenter, who will admit the patient to a monitored bed for further evaluation and treatment.   I personally reviewed the laboratory and imaging results with the Patient and answered all related questions prior to admission.   Impression & Plan      Medical Decision Making:  Daya Ignacio is an 88 year old female who has had episodes of ESRBL E.coli UTI's and has been evaluated by infectious disease while inpatient. The patient is also had C.Diff over this time as well. She presents today due to complaints of vomiting at home, with weakness that has continued here, and dysuria. The patient does have signs of a UTI, though I can't prove ESRBL, I will treat it as if it was. Started her on Merrem. Cultured her, and admitted to the hospitlaist under observation status. A CT scan was ordered for infectious disease to look for anatomic abnormality, but she is otherwise stable and doesn't appear septic.     Diagnosis:    ICD-10-CM    1. UTI due to extended-spectrum beta lactamase (ESBL) producing Escherichia coli N39.0 Blood culture ONE site    B96.29 Blood culture ONE site    Z16.12    2. Generalized muscle weakness M62.81    3. C. difficile colitis A04.72        Disposition:  Admitted under the supervision of Dr. Carpenter       Scribe Disclosure:  I, Lisseth Tyson, am serving as a scribe at 7:49 AM on 7/4/2019 to document services personally performed by Paresh Pena MD based on my observations and the provider's statements to me.    Lisseth Tyson  7/4/2019   [facility] EMERGENCY DEPARTMENT       Paresh Pena MD  07/04/19 1144

## 2019-07-05 LAB
ANION GAP SERPL CALCULATED.3IONS-SCNC: 5 MMOL/L (ref 3–14)
BASOPHILS # BLD AUTO: 0 10E9/L (ref 0–0.2)
BASOPHILS NFR BLD AUTO: 0.5 %
BUN SERPL-MCNC: 14 MG/DL (ref 7–30)
CALCIUM SERPL-MCNC: 8.5 MG/DL (ref 8.5–10.1)
CHLORIDE SERPL-SCNC: 108 MMOL/L (ref 94–109)
CO2 SERPL-SCNC: 25 MMOL/L (ref 20–32)
CREAT SERPL-MCNC: 0.49 MG/DL (ref 0.52–1.04)
DIFFERENTIAL METHOD BLD: ABNORMAL
EOSINOPHIL # BLD AUTO: 0.2 10E9/L (ref 0–0.7)
EOSINOPHIL NFR BLD AUTO: 2.1 %
ERYTHROCYTE [DISTWIDTH] IN BLOOD BY AUTOMATED COUNT: 17.2 % (ref 10–15)
GFR SERPL CREATININE-BSD FRML MDRD: 86 ML/MIN/{1.73_M2}
GLUCOSE SERPL-MCNC: 83 MG/DL (ref 70–99)
HCT VFR BLD AUTO: 32.6 % (ref 35–47)
HGB BLD-MCNC: 10.5 G/DL (ref 11.7–15.7)
IMM GRANULOCYTES # BLD: 0.1 10E9/L (ref 0–0.4)
IMM GRANULOCYTES NFR BLD: 0.8 %
LYMPHOCYTES # BLD AUTO: 1.2 10E9/L (ref 0.8–5.3)
LYMPHOCYTES NFR BLD AUTO: 15.7 %
MAGNESIUM SERPL-MCNC: 2.1 MG/DL (ref 1.6–2.3)
MCH RBC QN AUTO: 30.7 PG (ref 26.5–33)
MCHC RBC AUTO-ENTMCNC: 32.2 G/DL (ref 31.5–36.5)
MCV RBC AUTO: 95 FL (ref 78–100)
MONOCYTES # BLD AUTO: 0.8 10E9/L (ref 0–1.3)
MONOCYTES NFR BLD AUTO: 11.2 %
NEUTROPHILS # BLD AUTO: 5.1 10E9/L (ref 1.6–8.3)
NEUTROPHILS NFR BLD AUTO: 69.7 %
NRBC # BLD AUTO: 0 10*3/UL
NRBC BLD AUTO-RTO: 0 /100
PLATELET # BLD AUTO: 223 10E9/L (ref 150–450)
POTASSIUM SERPL-SCNC: 3.2 MMOL/L (ref 3.4–5.3)
POTASSIUM SERPL-SCNC: 4 MMOL/L (ref 3.4–5.3)
RBC # BLD AUTO: 3.42 10E12/L (ref 3.8–5.2)
SODIUM SERPL-SCNC: 138 MMOL/L (ref 133–144)
WBC # BLD AUTO: 7.3 10E9/L (ref 4–11)

## 2019-07-05 PROCEDURE — 25000132 ZZH RX MED GY IP 250 OP 250 PS 637: Mod: GY | Performed by: INTERNAL MEDICINE

## 2019-07-05 PROCEDURE — 80048 BASIC METABOLIC PNL TOTAL CA: CPT | Performed by: INTERNAL MEDICINE

## 2019-07-05 PROCEDURE — 99226 ZZC SUBSEQUENT OBSERVATION CARE,LEVEL III: CPT | Performed by: INTERNAL MEDICINE

## 2019-07-05 PROCEDURE — 96366 THER/PROPH/DIAG IV INF ADDON: CPT

## 2019-07-05 PROCEDURE — 84132 ASSAY OF SERUM POTASSIUM: CPT | Performed by: INTERNAL MEDICINE

## 2019-07-05 PROCEDURE — 85025 COMPLETE CBC W/AUTO DIFF WBC: CPT | Performed by: INTERNAL MEDICINE

## 2019-07-05 PROCEDURE — 83735 ASSAY OF MAGNESIUM: CPT | Performed by: INTERNAL MEDICINE

## 2019-07-05 PROCEDURE — G0378 HOSPITAL OBSERVATION PER HR: HCPCS

## 2019-07-05 PROCEDURE — 25800030 ZZH RX IP 258 OP 636: Performed by: INTERNAL MEDICINE

## 2019-07-05 PROCEDURE — 25000128 H RX IP 250 OP 636: Performed by: INTERNAL MEDICINE

## 2019-07-05 PROCEDURE — 36415 COLL VENOUS BLD VENIPUNCTURE: CPT | Performed by: INTERNAL MEDICINE

## 2019-07-05 RX ORDER — POTASSIUM CHLORIDE 29.8 MG/ML
20 INJECTION INTRAVENOUS
Status: DISCONTINUED | OUTPATIENT
Start: 2019-07-05 | End: 2019-07-07 | Stop reason: HOSPADM

## 2019-07-05 RX ORDER — POTASSIUM CHLORIDE 1.5 G/1.58G
20-40 POWDER, FOR SOLUTION ORAL
Status: DISCONTINUED | OUTPATIENT
Start: 2019-07-05 | End: 2019-07-07 | Stop reason: HOSPADM

## 2019-07-05 RX ORDER — MAGNESIUM SULFATE HEPTAHYDRATE 40 MG/ML
4 INJECTION, SOLUTION INTRAVENOUS EVERY 4 HOURS PRN
Status: DISCONTINUED | OUTPATIENT
Start: 2019-07-05 | End: 2019-07-07 | Stop reason: HOSPADM

## 2019-07-05 RX ORDER — LIDOCAINE 4 G/G
1 PATCH TOPICAL
Status: DISCONTINUED | OUTPATIENT
Start: 2019-07-05 | End: 2019-07-07 | Stop reason: HOSPADM

## 2019-07-05 RX ORDER — POTASSIUM CHLORIDE 1500 MG/1
20-40 TABLET, EXTENDED RELEASE ORAL
Status: DISCONTINUED | OUTPATIENT
Start: 2019-07-05 | End: 2019-07-07 | Stop reason: HOSPADM

## 2019-07-05 RX ORDER — POTASSIUM CHLORIDE 7.45 MG/ML
10 INJECTION INTRAVENOUS
Status: DISCONTINUED | OUTPATIENT
Start: 2019-07-05 | End: 2019-07-07 | Stop reason: HOSPADM

## 2019-07-05 RX ORDER — POTASSIUM CL/LIDO/0.9 % NACL 10MEQ/0.1L
10 INTRAVENOUS SOLUTION, PIGGYBACK (ML) INTRAVENOUS
Status: DISCONTINUED | OUTPATIENT
Start: 2019-07-05 | End: 2019-07-07 | Stop reason: HOSPADM

## 2019-07-05 RX ADMIN — POTASSIUM CHLORIDE 20 MEQ: 1500 TABLET, EXTENDED RELEASE ORAL at 15:28

## 2019-07-05 RX ADMIN — CLONIDINE HYDROCHLORIDE 0.1 MG: 0.1 TABLET ORAL at 07:59

## 2019-07-05 RX ADMIN — MEROPENEM 1 G: 1 INJECTION, POWDER, FOR SOLUTION INTRAVENOUS at 10:47

## 2019-07-05 RX ADMIN — ISOSORBIDE DINITRATE 10 MG: 10 TABLET ORAL at 07:59

## 2019-07-05 RX ADMIN — SERTRALINE HYDROCHLORIDE 100 MG: 100 TABLET ORAL at 07:59

## 2019-07-05 RX ADMIN — SODIUM CHLORIDE: 9 INJECTION, SOLUTION INTRAVENOUS at 14:02

## 2019-07-05 RX ADMIN — POTASSIUM CHLORIDE 40 MEQ: 1500 TABLET, EXTENDED RELEASE ORAL at 12:52

## 2019-07-05 RX ADMIN — MEROPENEM 1 G: 1 INJECTION, POWDER, FOR SOLUTION INTRAVENOUS at 18:34

## 2019-07-05 RX ADMIN — VANCOMYCIN HYDROCHLORIDE 125 MG: KIT at 20:08

## 2019-07-05 RX ADMIN — LISINOPRIL 40 MG: 40 TABLET ORAL at 07:59

## 2019-07-05 RX ADMIN — GABAPENTIN 1600 MG: 400 CAPSULE ORAL at 21:09

## 2019-07-05 RX ADMIN — GABAPENTIN 800 MG: 400 CAPSULE ORAL at 15:21

## 2019-07-05 RX ADMIN — METOPROLOL SUCCINATE 100 MG: 100 TABLET, EXTENDED RELEASE ORAL at 07:59

## 2019-07-05 RX ADMIN — SODIUM CHLORIDE: 9 INJECTION, SOLUTION INTRAVENOUS at 06:01

## 2019-07-05 RX ADMIN — LIDOCAINE 1 PATCH: 560 PATCH PERCUTANEOUS; TOPICAL; TRANSDERMAL at 18:43

## 2019-07-05 RX ADMIN — VANCOMYCIN HYDROCHLORIDE 125 MG: KIT at 08:07

## 2019-07-05 RX ADMIN — ISOSORBIDE DINITRATE 10 MG: 10 TABLET ORAL at 20:08

## 2019-07-05 RX ADMIN — CLONIDINE HYDROCHLORIDE 0.1 MG: 0.1 TABLET ORAL at 20:08

## 2019-07-05 RX ADMIN — GABAPENTIN 800 MG: 400 CAPSULE ORAL at 08:00

## 2019-07-05 RX ADMIN — VANCOMYCIN HYDROCHLORIDE 125 MG: KIT at 15:21

## 2019-07-05 RX ADMIN — AMLODIPINE BESYLATE 10 MG: 10 TABLET ORAL at 08:00

## 2019-07-05 RX ADMIN — VANCOMYCIN HYDROCHLORIDE 125 MG: KIT at 12:29

## 2019-07-05 RX ADMIN — MEROPENEM 1 G: 1 INJECTION, POWDER, FOR SOLUTION INTRAVENOUS at 02:11

## 2019-07-05 RX ADMIN — ISOSORBIDE DINITRATE 10 MG: 10 TABLET ORAL at 15:21

## 2019-07-05 RX ADMIN — ACETAMINOPHEN 650 MG: 325 TABLET, FILM COATED ORAL at 21:09

## 2019-07-05 NOTE — PROGRESS NOTES
"[facility]    Hospitalist Progress Note      Assessment & Plan   Daya Ignacio is a 88 year old woman admitted to observation unit on 7/4/19. PMH significant for chronic back pain, 2 recent hospitalizations for ESBL UTI, acute GI bleed, and recent C. difficile treatment.  Patient was recently hospitalized 5/26-5/31 for ESBL UTI treated with Bactrim and again 6/10-6/16 for ESBL UTI (now resistant to Bactrim and treated with meropenem) as well as C. difficile colitis treated with oral vancomycin. Patient completed the oral vancomycin a few days before presentation on 7/4.  Patient presented to emergency department 7/4 for evaluation of \"UTI symptoms.\"  Evaluation in the emergency department revealed urinalysis concerning for infection with moderate LE, positive nitrites, 51 WBCs/hpf, and many bacteria. Serum WBC within normal limits and patient afebrile.   Urine culture and blood cultures x2 were collected.  Given patient's recent ESBL, she was started on meropenem in the emergency department.  CT scan completed in the emergency department did not reveal any significant urinary tract findings concerning for a nidus of infection.  Patient was been registered to observation status for further evaluation and treatment.     Urinary tract infection  Recent ESBL E. coli urinary tract infections  Urine and blood cultures were collected in the emergency department. Patient was started on IV meropenem.   Fortunately no evidence of hydronephrosis or urinary tract calculi on CT scan.  Blood cultures negative to date.  Growing greater than 100K mixed urogenital jorge on 7/5 afternoon.  Given patient's history, discussed with infectious disease, Dr. Camejo.  It is recommended requesting further edification and susceptibility testing by the micro lab.  This was requested 7/5 afternoon.   that I spoke with noted that there were gram-negative rods present and that they would continue identification.  Dr. Camejo " recommended continuing IV meropenem until further information available.  If ESBL is not present, treat as indicated and he recommended using Bactrim if sensitive.  He also agreed with vancomycin prophylaxis throughout duration of antibiotic course as below.     Recent C. difficile infection  Patient completed C. difficile treatment with oral vancomycin a few days before presentation.    Given need for recurrent systemic antibiotics, resuming vancomycin 125 mg 4 times daily for prophylaxis.    Dr. Camejo agreed with this plan and recommended continuing through duration of antibiotics.     Orthostasis  Suspected dehydration  Patient reported decreased p.o. intake for 3 days prior to admission.  States that even during stay she has had little appetite and has not been taking much in.    Patient was orthostatic on 7/5 morning.  We will continue IV fluids, but at a gentle rate given history of heart failure.  Given the 500 mL bolus, as well.  Continue to follow closely.  Repeat orthostatics in the morning.    Left pelvic cyst  Noted incidentally on CT scan.  Radiologist recommends correlation with pelvic ultrasound given patient's postmenopausal age for further evaluation. No free fluid.  Patient can follow with primary care provider.     Coronary artery disease (history of NSTEMI 5/2017)  Hypertension  History of congestive heart failure with preserved ejection fraction  History of moderate pulmonary hypertension  Continue PTA Imdur, metoprolol, lisinopril, and clonidine.  Patient does not appear volume overloaded.  She is not on any diuretics.  Patient is notably no longer on aspirin.  Patient can follow with primary care.     Depression  Continue PTA Zoloft.     Neuropathy  Continue PTA gabapentin.    DVT Prophylaxis: Ambulate every shift  Code Status: DNR/DNI  Expected discharge: Hopeful for discharge tomorrow pending urine culture and sensitivity results.    Arina Schafer MD [facility]  Hospitalist Service  [place]  [facility]  Text Page (7am - 6pm)    Interval History   Patient reported that she was dizzy on sitting up this morning and was found to be orthostatic.  Continuing IV fluids and given additional 500 mL bolus.  Patient continues to complain of suprapubic tenderness.  Also notes decreased appetite and decreased p.o. intake since stay.  Otherwise no acute events.  Patient otherwise without new complaints.    -Data reviewed today: I reviewed all new labs and imaging results over the last 24 hours.     Physical Exam   Temp: 98.4  F (36.9  C) Temp src: Oral BP: 118/50 Pulse: 57   Resp: 16 SpO2: 96 % O2 Device: None (Room air)    Vitals:    07/04/19 1242   Weight: 54 kg (119 lb)     Vital Signs with Ranges  Temp:  [97.2  F (36.2  C)-98.4  F (36.9  C)] 98.4  F (36.9  C)  Pulse:  [57-80] 57  Resp:  [16] 16  BP: ()/(44-72) 118/50  SpO2:  [93 %-98 %] 96 %  No intake/output data recorded.    Constitutional: Elderly woman who appears much younger than her age.  Alert and oriented x3. No acute distress.  Sitting up in chair at bedside.  Nontoxic, pleasant.  HEENT: NCAT. EOMI. Moist oral mucosa.  Respiratory: Clear to auscultation bilaterally. No crackles or wheezes.  Cardiovascular: Regular rate and rhythm. No murmur.  GI: Patient continues to endorse suprapubic tenderness.  Otherwise nontender.  Abdomen is soft and nondistended. Normoactive bowel sounds.   Musculoskeletal: No gross deformities.  No peripheral edema.  Neurologic: Alert and oriented x3. No focal neurologic deficits.     Medications     sodium chloride       sodium chloride 75 mL/hr at 07/05/19 0807       sodium chloride 0.9%  500 mL Intravenous Once     amLODIPine  10 mg Oral Daily     cloNIDine  0.1 mg Oral BID     gabapentin  1,600 mg Oral At Bedtime     gabapentin  800 mg Oral BID     isosorbide dinitrate  10 mg Oral TID     lisinopril  40 mg Oral Daily     meropenem  1 g Intravenous Q8H     metoprolol succinate ER  100 mg Oral Daily     sertraline   100 mg Oral Daily     vancomycin  125 mg Oral 4x Daily       Data   Recent Labs   Lab 07/05/19  0606 07/04/19  0815   WBC 7.3 7.5   HGB 10.5* 12.5   MCV 95 92    235    133   POTASSIUM 3.2* 3.6   CHLORIDE 108 100   CO2 25 24   BUN 14 10   CR 0.49* 0.43*   ANIONGAP 5 9   DAVE 8.5 9.4   GLC 83 102*       No results found for this or any previous visit (from the past 24 hour(s)).

## 2019-07-05 NOTE — PLAN OF CARE
PRIMARY DIAGNOSIS: UTI  OUTPATIENT/OBSERVATION GOALS TO BE MET BEFORE DISCHARGE:  Diagnostic test and consults (if applicable) complete: Yes, UC pending    Vitals signs stable or return to baseline: No, orthostatic this morning    Tolerate oral intake to maintain hydration: Yes    Pain status: Improved with use of alternative comfort measures i.e.: ,      Tolerating oral antibiotics or has plans for home infusion setup:  Yes    Return to near baseline physical activity: Yes    Discharge Planner Nurse   Safe discharge environment identified: Yes  Barriers to discharge: No, not once medically stable        Entered by: Katherine Thomas 07/05/2019   VSS stable and on room air. UP with SBA and walker. Patient denies pain other than baseline back pain. IV ABX and maintenance fluids running. Possible ID consult based on lab results. Will continue to monitor and provide cares.  Please review provider order for any additional goals. Nurse to notify provider when observation goals have been met and patient is ready for discharge.

## 2019-07-05 NOTE — PROGRESS NOTES
"Pt ambulated in the hallway SBA with walker.C/o of lower back pain 5/10.Stated feeling \"a little dizzy\".RN notified  "

## 2019-07-05 NOTE — PLAN OF CARE
PRIMARY DIAGNOSIS: URINARY TRACK INFECTION  OUTPATIENT/OBSERVATION GOALS TO BE MET BEFORE DISCHARGE:  1. Diagnostic test and consults (if applicable) complete: Yes    2. Vitals signs stable or return to baseline: Yes    3. Tolerate oral intake to maintain hydration: Yes    4. Pain status: Pain free.    5. Tolerating oral antibiotics or has plans for home infusion setup:  Yes    6. Return to near baseline physical activity: Yes    Discharge Planner Nurse   Safe discharge environment identified: Yes  Barriers to discharge: No       Entered by: Austin Valenzuela 07/05/2019 5:39 AM   Afebrile.  No pain or nausea reported.  Will continue to monitor and manage pain and nausea this morning.

## 2019-07-05 NOTE — PLAN OF CARE
PRIMARY DIAGNOSIS: UTI  OUTPATIENT/OBSERVATION GOALS TO BE MET BEFORE DISCHARGE:  Diagnostic test and consults (if applicable) complete: Yes, UC pending     Vitals signs stable or return to baseline: No, orthostatic this morning     Tolerate oral intake to maintain hydration: Yes     Pain status: Improved with use of alternative comfort measures i.e.: ,       Tolerating oral antibiotics or has plans for home infusion setup:  Yes     Return to near baseline physical activity: Yes     Discharge Planner Nurse   Safe discharge environment identified: Yes  Barriers to discharge: No, not once medically stable        Entered by: Katherine Thomas 07/05/2019   VSS stable and on room air. UP with SBA and walker. Patient denies pain other than baseline back pain.  Bolus being given 500/ml. Will replace potassium for lab value of 3.2. IV ABX and maintenance fluids running. Possible ID consult based on lab results. Will continue to monitor and provide cares.    Please review provider order for any additional goals. Nurse to notify provider when observation goals have been met and patient is ready for discharge.

## 2019-07-05 NOTE — PLAN OF CARE
PRIMARY DIAGNOSIS:  UTI  OUTPATIENT/OBSERVATION GOALS TO BE MET BEFORE DISCHARGE:  Diagnostic test and consults (if applicable) complete: Yes    Vitals signs stable or return to baseline: Yes    Tolerate oral intake to maintain hydration: No - pt has had very poor intake over last 3 days and was vomiting last night.  She has iv fluids and is able to take some sips.    Pain status: complains of mild abdominal cramping r/t uti and also has chronic low back pain, both of which she declines medication for.     Tolerating oral antibiotics or has plans for home infusion setup:  Pt is receiving IV abx at this time for her UTI.  She is also receiving oral vanco for cdiff prophylaxis as she just finished a course of vanco for cdiff infection on 6/23.     Return to near baseline physical activity: Yes - up w/ walker and is steady.  Using SBA for safety here in the hospital, but she does not need assistance.    Discharge Planner Nurse   Safe discharge environment identified: Yes  Barriers to discharge: Not once medically cleared     Please review provider order for any additional goals.   Nurse to notify provider when observation goals have been met and patient is ready for discharge.    UC and BC pending.

## 2019-07-05 NOTE — PLAN OF CARE
PRIMARY DIAGNOSIS: URINARY TRACK INFECTION  OUTPATIENT/OBSERVATION GOALS TO BE MET BEFORE DISCHARGE:  Diagnostic test and consults (if applicable) complete: Yes    Vitals signs stable or return to baseline: Yes    Tolerate oral intake to maintain hydration: Yes    Pain status: Pain free.    Tolerating oral antibiotics or has plans for home infusion setup:  Yes    Return to near baseline physical activity: Yes    Discharge Planner Nurse   Safe discharge environment identified: Yes  Barriers to discharge: No       Entered by: Austin Valenzuela 07/05/2019 4:05 AM   Afebrile.  No pain or nausea reported.  Will continue to monitor and manage pain and nausea overnight.

## 2019-07-05 NOTE — CONSULTS
Care Transition Initial Assessment - RN        Met with: Patient.  DATA   Active Problems:    Urinary tract infection       Cognitive Status: awake, alert and oriented.  Primary Care Clinic Name: [facility]  Primary Care MD Name: Marcy AVILA  Contact information and PCP information verified: Yes         Quality of Family Relationships: helpful, supportive  Description of Support System: Supportive, Involved   Who is your support system?: Children   Support Assessment: Adequate family and caregiver support   Insurance concerns: No Insurance issues identified  ASSESSMENT  Patient currently receives the following services:  [facility]        Identified issues/concerns regarding health management: RN CTS following d/t elevated EMI. Patient was admitted with recurrent UTI. Patient has had three other hospitalizations since May 26th for ESBL UTI, C Diff and acute on chronic pain. Patient has a history of HTN, chronic pain, anxiety, CHF, and pulmonary HTN.   No gaps in care identified.   Patient lives independently at the [facility]. Patient relies on son or a friend for transportation to appointments. Patient manages medications on her own. Patient cooks her own meals. For groceries, patient either uses the bus or son will go shopping for the patient. Son, Gael, is supportive and lives locally in [place]. Patient either uses a cane or a walker for ambulation.   PLAN  Financial costs for the patient were not discussed .  Patient given options and choices for discharge Yes .  Patient/family is agreeable to the plan?  Yes: pending medical readiness.   Patient anticipates discharging to Home .        Patient anticipates needs for home equipment: No  Transportation/person available to transport on day of discharge  is jose Mayo.   Plan/Disposition: Home with home care  Appointments: Canceled scheduled PCP appointment on 7/6. Patient will call to reschedule.       Care  (CTS) will continue to  follow as needed.      Isabel Rowland MA-RN  Care Transition Services  [phone]

## 2019-07-06 PROBLEM — B96.29 URINARY TRACT INFECTION DUE TO EXTENDED-SPECTRUM BETA LACTAMASE (ESBL) PRODUCING ESCHERICHIA COLI: Status: ACTIVE | Noted: 2019-07-06

## 2019-07-06 PROBLEM — N39.0 URINARY TRACT INFECTION DUE TO EXTENDED-SPECTRUM BETA LACTAMASE (ESBL) PRODUCING ESCHERICHIA COLI: Status: ACTIVE | Noted: 2019-07-06

## 2019-07-06 PROBLEM — Z16.12 URINARY TRACT INFECTION DUE TO EXTENDED-SPECTRUM BETA LACTAMASE (ESBL) PRODUCING ESCHERICHIA COLI: Status: ACTIVE | Noted: 2019-07-06

## 2019-07-06 LAB
ANION GAP SERPL CALCULATED.3IONS-SCNC: 5 MMOL/L (ref 3–14)
BUN SERPL-MCNC: 11 MG/DL (ref 7–30)
CALCIUM SERPL-MCNC: 8.5 MG/DL (ref 8.5–10.1)
CHLORIDE SERPL-SCNC: 108 MMOL/L (ref 94–109)
CO2 SERPL-SCNC: 25 MMOL/L (ref 20–32)
CREAT SERPL-MCNC: 0.4 MG/DL (ref 0.52–1.04)
ERYTHROCYTE [DISTWIDTH] IN BLOOD BY AUTOMATED COUNT: 16.7 % (ref 10–15)
GFR SERPL CREATININE-BSD FRML MDRD: >90 ML/MIN/{1.73_M2}
GLUCOSE SERPL-MCNC: 91 MG/DL (ref 70–99)
HCT VFR BLD AUTO: 32.6 % (ref 35–47)
HGB BLD-MCNC: 10.4 G/DL (ref 11.7–15.7)
MAGNESIUM SERPL-MCNC: 1.9 MG/DL (ref 1.6–2.3)
MCH RBC QN AUTO: 30.2 PG (ref 26.5–33)
MCHC RBC AUTO-ENTMCNC: 31.9 G/DL (ref 31.5–36.5)
MCV RBC AUTO: 95 FL (ref 78–100)
PLATELET # BLD AUTO: 202 10E9/L (ref 150–450)
POTASSIUM SERPL-SCNC: 3.6 MMOL/L (ref 3.4–5.3)
RBC # BLD AUTO: 3.44 10E12/L (ref 3.8–5.2)
SODIUM SERPL-SCNC: 138 MMOL/L (ref 133–144)
WBC # BLD AUTO: 4.9 10E9/L (ref 4–11)

## 2019-07-06 PROCEDURE — 25800030 ZZH RX IP 258 OP 636: Performed by: EMERGENCY MEDICINE

## 2019-07-06 PROCEDURE — 99232 SBSQ HOSP IP/OBS MODERATE 35: CPT | Performed by: HOSPITALIST

## 2019-07-06 PROCEDURE — 99207 ZZC CDG-MDM COMPONENT: MEETS LOW - DOWN CODED: CPT | Performed by: HOSPITALIST

## 2019-07-06 PROCEDURE — 25000128 H RX IP 250 OP 636: Performed by: HOSPITALIST

## 2019-07-06 PROCEDURE — 25000128 H RX IP 250 OP 636: Performed by: INTERNAL MEDICINE

## 2019-07-06 PROCEDURE — G0378 HOSPITAL OBSERVATION PER HR: HCPCS

## 2019-07-06 PROCEDURE — 83735 ASSAY OF MAGNESIUM: CPT | Performed by: INTERNAL MEDICINE

## 2019-07-06 PROCEDURE — 85027 COMPLETE CBC AUTOMATED: CPT | Performed by: INTERNAL MEDICINE

## 2019-07-06 PROCEDURE — 36415 COLL VENOUS BLD VENIPUNCTURE: CPT | Performed by: INTERNAL MEDICINE

## 2019-07-06 PROCEDURE — 25000132 ZZH RX MED GY IP 250 OP 250 PS 637: Mod: GY | Performed by: HOSPITALIST

## 2019-07-06 PROCEDURE — 12000011 ZZH R&B MS OVERFLOW

## 2019-07-06 PROCEDURE — 25000131 ZZH RX MED GY IP 250 OP 636 PS 637: Mod: GY | Performed by: INTERNAL MEDICINE

## 2019-07-06 PROCEDURE — 25000132 ZZH RX MED GY IP 250 OP 250 PS 637: Mod: GY | Performed by: INTERNAL MEDICINE

## 2019-07-06 PROCEDURE — 80048 BASIC METABOLIC PNL TOTAL CA: CPT | Performed by: INTERNAL MEDICINE

## 2019-07-06 PROCEDURE — 96366 THER/PROPH/DIAG IV INF ADDON: CPT

## 2019-07-06 RX ORDER — IBUPROFEN 400 MG/1
400 TABLET, FILM COATED ORAL EVERY 6 HOURS PRN
Status: DISCONTINUED | OUTPATIENT
Start: 2019-07-06 | End: 2019-07-07 | Stop reason: HOSPADM

## 2019-07-06 RX ADMIN — METOPROLOL SUCCINATE 100 MG: 100 TABLET, EXTENDED RELEASE ORAL at 08:42

## 2019-07-06 RX ADMIN — GABAPENTIN 800 MG: 400 CAPSULE ORAL at 13:07

## 2019-07-06 RX ADMIN — VANCOMYCIN HYDROCHLORIDE 125 MG: KIT at 11:54

## 2019-07-06 RX ADMIN — MEROPENEM 1 G: 1 INJECTION, POWDER, FOR SOLUTION INTRAVENOUS at 17:25

## 2019-07-06 RX ADMIN — VANCOMYCIN HYDROCHLORIDE 125 MG: KIT at 20:15

## 2019-07-06 RX ADMIN — GABAPENTIN 800 MG: 400 CAPSULE ORAL at 08:42

## 2019-07-06 RX ADMIN — VANCOMYCIN HYDROCHLORIDE 125 MG: KIT at 15:17

## 2019-07-06 RX ADMIN — VANCOMYCIN HYDROCHLORIDE 125 MG: KIT at 08:51

## 2019-07-06 RX ADMIN — IBUPROFEN 400 MG: 400 TABLET ORAL at 15:27

## 2019-07-06 RX ADMIN — ISOSORBIDE DINITRATE 10 MG: 10 TABLET ORAL at 08:43

## 2019-07-06 RX ADMIN — AMLODIPINE BESYLATE 10 MG: 10 TABLET ORAL at 08:43

## 2019-07-06 RX ADMIN — SODIUM CHLORIDE 1000 ML: 9 INJECTION, SOLUTION INTRAVENOUS at 04:19

## 2019-07-06 RX ADMIN — LIDOCAINE 1 PATCH: 560 PATCH PERCUTANEOUS; TOPICAL; TRANSDERMAL at 20:10

## 2019-07-06 RX ADMIN — ONDANSETRON 4 MG: 4 TABLET, ORALLY DISINTEGRATING ORAL at 08:51

## 2019-07-06 RX ADMIN — ISOSORBIDE DINITRATE 10 MG: 10 TABLET ORAL at 13:07

## 2019-07-06 RX ADMIN — MEROPENEM 1 G: 1 INJECTION, POWDER, FOR SOLUTION INTRAVENOUS at 02:21

## 2019-07-06 RX ADMIN — ACETAMINOPHEN 650 MG: 325 TABLET, FILM COATED ORAL at 17:25

## 2019-07-06 RX ADMIN — ACETAMINOPHEN 650 MG: 325 TABLET, FILM COATED ORAL at 13:07

## 2019-07-06 RX ADMIN — ACETAMINOPHEN 650 MG: 325 TABLET, FILM COATED ORAL at 08:51

## 2019-07-06 RX ADMIN — CLONIDINE HYDROCHLORIDE 0.1 MG: 0.1 TABLET ORAL at 08:43

## 2019-07-06 RX ADMIN — ISOSORBIDE DINITRATE 10 MG: 10 TABLET ORAL at 20:15

## 2019-07-06 RX ADMIN — CLONIDINE HYDROCHLORIDE 0.1 MG: 0.1 TABLET ORAL at 20:15

## 2019-07-06 RX ADMIN — MEROPENEM 1 G: 1 INJECTION, POWDER, FOR SOLUTION INTRAVENOUS at 09:40

## 2019-07-06 RX ADMIN — ENOXAPARIN SODIUM 40 MG: 40 INJECTION SUBCUTANEOUS at 13:46

## 2019-07-06 RX ADMIN — SERTRALINE HYDROCHLORIDE 100 MG: 100 TABLET ORAL at 08:43

## 2019-07-06 RX ADMIN — GABAPENTIN 1600 MG: 400 CAPSULE ORAL at 22:03

## 2019-07-06 RX ADMIN — LISINOPRIL 40 MG: 40 TABLET ORAL at 08:42

## 2019-07-06 NOTE — PLAN OF CARE
"PRIMARY DIAGNOSIS: UTI    OUTPATIENT/OBSERVATION GOALS TO BE MET BEFORE DISCHARGE  1. Orthostatic performed: Yes:          Lying Orthostatic BP: 177/77         Sitting Orthostatic BP: 177/80         Standing Orthostatic BP: 184/83     2. Tolerating PO medications: Yes    3. Return to near baseline physical activity: Yes    4. Cleared for discharge by consultants (if involved): No    Discharge Planner Nurse   Safe discharge environment identified: unknown   Barriers to discharge: No       Entered by: Jackie King 07/06/2019 9:00 AM     Please review provider order for any additional goals.   Nurse to notify provider when observation goals have been met and patient is ready for discharge.    /77 (BP Location: Right arm)   Pulse 71   Temp 98.2  F (36.8  C) (Oral)   Resp 16   Ht 1.549 m (5' 1\")   Wt 54 kg (119 lb)   SpO2 96%   BMI 22.48 kg/m      Orientation: A&Ox4  Neurological: WNL  Pain status: reporting aching back pain 10/10 and groin pain 8/10. PRN tylenol given, pt repositioned in chair, ambulated, and returned to bed  Activity: SBA via walker  Peripheral neurovascular: reporting new numbness/tingling in bilateral feet  Resp: WNL  Lungs: clear  Cardiac: WNL  GI: reporting intermittent nausea - PRN zofran given, no emesis noted. Pt having frequent, small, formed stools. Currently is being treated with vanco for cdiff infection.  : pt reporting urinary frequency, wears incontinent brief for dribbling, able to call staff when needing to void and is able to make it to toilet  Skin: WNL  IVF: NS 75mL/hr  Diet: low fat, low sodium, fair amount of breakfast consumed; however, pt did report nausea after finishing breakfast  Plan: pain and nausea control, PO vanco for cdiff, IV meropenem for UTI, IVF, encourage PO intake, ambulate   "

## 2019-07-06 NOTE — PLAN OF CARE
"PRIMARY DIAGNOSIS: UTI    OUTPATIENT/OBSERVATION GOALS TO BE MET BEFORE DISCHARGE  1. Orthostatic performed: Yes:          Lying Orthostatic BP: 177/77         Sitting Orthostatic BP: 177/80         Standing Orthostatic BP: 184/83     2. Tolerating PO medications: Yes    3. Return to near baseline physical activity: Yes    4. Cleared for discharge by consultants (if involved): No    Discharge Planner Nurse   Safe discharge environment identified: unknown   Barriers to discharge: No       Entered by: Jackie King 07/06/2019 12:19 PM     Please review provider order for any additional goals.   Nurse to notify provider when observation goals have been met and patient is ready for discharge.    /57 (BP Location: Right arm)   Pulse 56   Temp 97.9  F (36.6  C) (Oral)   Resp 14   Ht 1.549 m (5' 1\")   Wt 54 kg (119 lb)   SpO2 97%   BMI 22.48 kg/m        Pt continues to report lower back pain, PRN tylenol given and was effective for a short while, T-pump in place and helping with pain. Pt frequently voiding with small, formed stools, calls appropriately. Nausea subsided after PRN zofran given.   Plan: pain and nausea control, PO vanco for cdiff, IV meropenem for UTI, IVF, encourage PO intake, ambulate   "

## 2019-07-06 NOTE — PLAN OF CARE
PRIMARY DIAGNOSIS: UTI  OUTPATIENT/OBSERVATION GOALS TO BE MET BEFORE DISCHARGE:  1. Diagnostic test and consults (if applicable) complete: Yes      2. Vitals signs stable or return to baseline:  yes      3. Tolerate oral intake to maintain hydration: Yes     4. Pain status: Improved with use of alternative comfort measures i.e.: T-pump      5. Tolerating oral antibiotics or has plans for home infusion setup:  Yes     6. Return to near baseline physical activity: Yes     Discharge Planner Nurse   Safe discharge environment identified: Yes  Barriers to discharge: No, not once medically stable        Entered by: Kanu Madrigal 07/05/2019     VSS. SBA and walker. Patient has a headache, tylenol given.  Lidocaine patch for chronic back pain.  Maintenance fluids running. Will continue to monitor and provide cares.    Please review provider order for any additional goals. Nurse to notify provider when observation goals have been met and patient is ready for discharge.

## 2019-07-06 NOTE — PROGRESS NOTES
Pt status changed to inpatient. Plan to continue IV meropenem for UTI, PO vanco for cdiff, pain and nausea control, encourage ambulation, and encourage oral intake.

## 2019-07-06 NOTE — PLAN OF CARE
PRIMARY DIAGNOSIS: UTI  OUTPATIENT/OBSERVATION GOALS TO BE MET BEFORE DISCHARGE:  Diagnostic test and consults (if applicable) complete: Yes,   Vitals signs stable or return to baseline: yes  1. Tolerate oral intake to maintain hydration: Yes    2. Pain status: Improved with use of alternative comfort measures, lidocaine  Patch and tylenol    3. Tolerating oral antibiotics or has plans for home infusion setup:  Yes, on oral vanco and  IV merrem    4. Return to near baseline physical activity: Yes    Discharge Planner Nurse   Safe discharge environment identified: Yes  Barriers to discharge: No, not once medically stable        Entered by: Shantal BERRY Che 07/06/2019   Vital signs:  Temp: 97.7  F (36.5  C) Temp src: Oral BP: 172/78 Pulse: 74   Resp: 18 SpO2: 97 %    Patient alert and oriented, VSS . UP with SBA and walker.  Back pain, had tylenol.IV ABX and maintenance fluids running. Will continue to monitor and provide cares.  Please review provider order for any additional goals. Nurse to notify provider when observation goals have been met and patient is ready for discharge.

## 2019-07-06 NOTE — PROGRESS NOTES
[facility]    Medicine Progress Note - Hospitalist Service       Date of Admission:  7/4/2019  Assessment & Plan      Daya Ignacio is a 88 year old woman with past medical history significant for chronic back pain, 2 recent hospitalizations for ESBL UTI, acute GI bleed, and recent C. difficile treatment.  Patient was recently hospitalized 5/26 through 5/31 for ESBL UTI treated with Bactrim and again 6/10 through 6/16 for ESBL UTI (now resistant to Bactrim and treated with meropenem) and has C. difficile colitis treated with oral vancomycin.  Patient completed the oral vancomycin a few days ago.    Patient admitted for weakness, UTI. Will need to wait for cultures.     Urinary tract infection with recent ESBL E. coli urinary tract infections    - final urine and blood cultures are pending    - on IV meropenem    - will wait for final cultures and decide if ID needs to be involved     Recent C. difficile infection    - patient completed C. difficile treatment with oral vancomycin in past 2 days    - given need for recurrent systemic antibiotics, resuming vancomycin 125 mg 4 times daily for prophylaxis    - will likely continue this for 2 weeks after final antibiotic treatment for UTI.     Left pelvic cyst    - Noted incidentally on CT scan    - follow-up with PCP/OB as outpt with TVUS       Coronary artery disease (history of an STEMI 5/2017)  Hypertension  History of congestive heart failure with preserved ejection fraction  History of moderate pulmonary hypertension    - Continue PTA Imdur, metoprolol, lisinopril, and clonidine.    - Patient does not appear volume overloaded.  She is not on any diuretics.    - Patient is notably no longer on aspirin.    Depression  Continue PTA Zoloft     Neuropathy  Continue PTA gabapentin    Son in room and updated  Admit to inpatient for history ESBL, need for IV broad spectrum antibiotics until final urine cultures return    Diet: Combination Diet Low Saturated  Fat Na <2400mg Diet    DVT Prophylaxis: Enoxaparin (Lovenox) SQ  Almanza Catheter: not present  Code Status: DNR/DNI      Disposition Plan   Expected discharge: 1-2 days, recommended to prior living arrangement once antibiotic plan established.  Entered: Juliano Mckeon MD 07/06/2019, 12:51 PM       The patient's care was discussed with the Bedside Nurse, Patient and Patient's Family.    Juliano Mckeon MD  Hospitalist Service  [facility]    ______________________________________________________________________    Interval History   Patient in chair. Feels well. This am had some dysuria. Now gone. No chest pain, sob, abdo pain, n/v/d    Data reviewed today: I reviewed all medications, new labs and imaging results over the last 24 hours. I personally reviewed no images or EKG's today.    Physical Exam   Vital Signs: Temp: 97.9  F (36.6  C) Temp src: Oral BP: 121/57 Pulse: 56   Resp: 14 SpO2: 97 % O2 Device: None (Room air)    Weight: 119 lbs 0 oz  Constitutional: awake, alert, cooperative, no apparent distress, and appears stated age  Eyes: Lids and lashes normal, pupils equal, round and reactive to light, extra ocular muscles intact, sclera clear, conjunctiva normal  Respiratory: No increased work of breathing, good air exchange, clear to auscultation bilaterally, no crackles or wheezing  Cardiovascular: Normal apical impulse, regular rate and rhythm, normal S1 and S2, no S3 or S4, and no murmur noted  GI: No scars, normal bowel sounds, soft, non-distended, non-tender, no masses palpated, no hepatosplenomegally  Skin: no bruising or bleeding    Data   Recent Labs   Lab 07/06/19  0541 07/05/19  1740 07/05/19  0606 07/04/19  0815   WBC 4.9  --  7.3 7.5   HGB 10.4*  --  10.5* 12.5   MCV 95  --  95 92     --  223 235     --  138 133   POTASSIUM 3.6 4.0 3.2* 3.6   CHLORIDE 108  --  108 100   CO2 25  --  25 24   BUN 11  --  14 10   CR 0.40*  --  0.49* 0.43*   ANIONGAP 5  --  5 9   DAVE 8.5  --   8.5 9.4   [facility] 91  --  83 102*     No results found for this or any previous visit (from the past 24 hour(s)).

## 2019-07-06 NOTE — PLAN OF CARE
PRIMARY DIAGNOSIS: UTI  OUTPATIENT/OBSERVATION GOALS TO BE MET BEFORE DISCHARGE:  Diagnostic test and consults (if applicable) complete: Yes,   Vitals signs stable or return to baseline: yes  1. Tolerate oral intake to maintain hydration: Yes    2. Pain status: Improved with use of alternative comfort measures, lidocaine  Patch and tylenol    3. Tolerating oral antibiotics or has plans for home infusion setup:  Yes, on oral vanco and  IV merrem    4. Return to near baseline physical activity: Yes    Discharge Planner Nurse   Safe discharge environment identified: Yes  Barriers to discharge: No, not once medically stable        Entered by: Shantal BERRY Che 07/06/2019   Vital signs:  Temp: 97.6  F (36.4  C) Temp src: Oral BP: 145/65 Pulse: 68   Resp: 16 SpO2: 95 %    Patient alert and oriented, VSS . UP with SBA and walker.  Back pain, had tylenol.IV ABX and maintenance fluids running. Will continue to monitor and provide cares.  Please review provider order for any additional goals. Nurse to notify provider when observation goals have been met and patient is ready for discharge.

## 2019-07-07 VITALS
RESPIRATION RATE: 18 BRPM | SYSTOLIC BLOOD PRESSURE: 120 MMHG | DIASTOLIC BLOOD PRESSURE: 69 MMHG | OXYGEN SATURATION: 97 % | HEART RATE: 63 BPM | HEIGHT: 61 IN | BODY MASS INDEX: 22.47 KG/M2 | TEMPERATURE: 98.2 F | WEIGHT: 119 LBS

## 2019-07-07 LAB
ANION GAP SERPL CALCULATED.3IONS-SCNC: 4 MMOL/L (ref 3–14)
BACTERIA SPEC CULT: ABNORMAL
BASOPHILS # BLD AUTO: 0 10E9/L (ref 0–0.2)
BASOPHILS NFR BLD AUTO: 1.1 %
BUN SERPL-MCNC: 10 MG/DL (ref 7–30)
CALCIUM SERPL-MCNC: 8.5 MG/DL (ref 8.5–10.1)
CHLORIDE SERPL-SCNC: 108 MMOL/L (ref 94–109)
CO2 SERPL-SCNC: 28 MMOL/L (ref 20–32)
CREAT SERPL-MCNC: 0.45 MG/DL (ref 0.52–1.04)
DIFFERENTIAL METHOD BLD: ABNORMAL
EOSINOPHIL # BLD AUTO: 0.2 10E9/L (ref 0–0.7)
EOSINOPHIL NFR BLD AUTO: 4.3 %
ERYTHROCYTE [DISTWIDTH] IN BLOOD BY AUTOMATED COUNT: 16.7 % (ref 10–15)
GFR SERPL CREATININE-BSD FRML MDRD: 89 ML/MIN/{1.73_M2}
GLUCOSE SERPL-MCNC: 93 MG/DL (ref 70–99)
HCT VFR BLD AUTO: 29.6 % (ref 35–47)
HGB BLD-MCNC: 9.4 G/DL (ref 11.7–15.7)
IMM GRANULOCYTES # BLD: 0 10E9/L (ref 0–0.4)
IMM GRANULOCYTES NFR BLD: 0.3 %
LYMPHOCYTES # BLD AUTO: 1 10E9/L (ref 0.8–5.3)
LYMPHOCYTES NFR BLD AUTO: 27.7 %
Lab: ABNORMAL
MCH RBC QN AUTO: 30.1 PG (ref 26.5–33)
MCHC RBC AUTO-ENTMCNC: 31.8 G/DL (ref 31.5–36.5)
MCV RBC AUTO: 95 FL (ref 78–100)
MONOCYTES # BLD AUTO: 0.5 10E9/L (ref 0–1.3)
MONOCYTES NFR BLD AUTO: 13 %
NEUTROPHILS # BLD AUTO: 2 10E9/L (ref 1.6–8.3)
NEUTROPHILS NFR BLD AUTO: 53.6 %
NRBC # BLD AUTO: 0 10*3/UL
NRBC BLD AUTO-RTO: 0 /100
PLATELET # BLD AUTO: 173 10E9/L (ref 150–450)
POTASSIUM SERPL-SCNC: 3.3 MMOL/L (ref 3.4–5.3)
POTASSIUM SERPL-SCNC: 4.1 MMOL/L (ref 3.4–5.3)
RBC # BLD AUTO: 3.12 10E12/L (ref 3.8–5.2)
SODIUM SERPL-SCNC: 140 MMOL/L (ref 133–144)
SPECIMEN SOURCE: ABNORMAL
WBC # BLD AUTO: 3.8 10E9/L (ref 4–11)

## 2019-07-07 PROCEDURE — 85025 COMPLETE CBC W/AUTO DIFF WBC: CPT | Performed by: HOSPITALIST

## 2019-07-07 PROCEDURE — 80048 BASIC METABOLIC PNL TOTAL CA: CPT | Performed by: HOSPITALIST

## 2019-07-07 PROCEDURE — 25000128 H RX IP 250 OP 636: Performed by: INTERNAL MEDICINE

## 2019-07-07 PROCEDURE — 25000132 ZZH RX MED GY IP 250 OP 250 PS 637: Mod: GY | Performed by: HOSPITALIST

## 2019-07-07 PROCEDURE — 36415 COLL VENOUS BLD VENIPUNCTURE: CPT | Performed by: HOSPITALIST

## 2019-07-07 PROCEDURE — 25000132 ZZH RX MED GY IP 250 OP 250 PS 637: Mod: GY | Performed by: INTERNAL MEDICINE

## 2019-07-07 PROCEDURE — 99239 HOSP IP/OBS DSCHRG MGMT >30: CPT | Performed by: HOSPITALIST

## 2019-07-07 PROCEDURE — 84132 ASSAY OF SERUM POTASSIUM: CPT | Performed by: HOSPITALIST

## 2019-07-07 PROCEDURE — 25000128 H RX IP 250 OP 636: Performed by: HOSPITALIST

## 2019-07-07 RX ORDER — VANCOMYCIN HYDROCHLORIDE 125 MG/1
125 CAPSULE ORAL 4 TIMES DAILY
Qty: 12 CAPSULE | Refills: 0 | Status: SHIPPED | OUTPATIENT
Start: 2019-07-07 | End: 2019-07-13

## 2019-07-07 RX ORDER — MEROPENEM 1 G/1
1 INJECTION, POWDER, FOR SOLUTION INTRAVENOUS ONCE
Status: COMPLETED | OUTPATIENT
Start: 2019-07-07 | End: 2019-07-07

## 2019-07-07 RX ADMIN — VANCOMYCIN HYDROCHLORIDE 125 MG: KIT at 11:44

## 2019-07-07 RX ADMIN — CLONIDINE HYDROCHLORIDE 0.1 MG: 0.1 TABLET ORAL at 08:00

## 2019-07-07 RX ADMIN — AMLODIPINE BESYLATE 10 MG: 10 TABLET ORAL at 08:00

## 2019-07-07 RX ADMIN — VANCOMYCIN HYDROCHLORIDE 125 MG: KIT at 07:59

## 2019-07-07 RX ADMIN — LISINOPRIL 40 MG: 40 TABLET ORAL at 07:59

## 2019-07-07 RX ADMIN — POTASSIUM CHLORIDE 20 MEQ: 1500 TABLET, EXTENDED RELEASE ORAL at 10:29

## 2019-07-07 RX ADMIN — ISOSORBIDE DINITRATE 10 MG: 10 TABLET ORAL at 08:00

## 2019-07-07 RX ADMIN — METOPROLOL SUCCINATE 100 MG: 100 TABLET, EXTENDED RELEASE ORAL at 07:59

## 2019-07-07 RX ADMIN — VANCOMYCIN HYDROCHLORIDE 125 MG: KIT at 15:02

## 2019-07-07 RX ADMIN — SERTRALINE HYDROCHLORIDE 100 MG: 100 TABLET ORAL at 08:00

## 2019-07-07 RX ADMIN — POTASSIUM CHLORIDE 40 MEQ: 1500 TABLET, EXTENDED RELEASE ORAL at 07:59

## 2019-07-07 RX ADMIN — ISOSORBIDE DINITRATE 10 MG: 10 TABLET ORAL at 14:21

## 2019-07-07 RX ADMIN — MEROPENEM 1 G: 1 INJECTION, POWDER, FOR SOLUTION INTRAVENOUS at 01:30

## 2019-07-07 RX ADMIN — GABAPENTIN 800 MG: 400 CAPSULE ORAL at 14:21

## 2019-07-07 RX ADMIN — ENOXAPARIN SODIUM 40 MG: 40 INJECTION SUBCUTANEOUS at 12:39

## 2019-07-07 RX ADMIN — MEROPENEM 1 G: 1 INJECTION, POWDER, FOR SOLUTION INTRAVENOUS at 14:22

## 2019-07-07 RX ADMIN — IBUPROFEN 400 MG: 400 TABLET ORAL at 01:30

## 2019-07-07 RX ADMIN — IBUPROFEN 400 MG: 400 TABLET ORAL at 10:29

## 2019-07-07 RX ADMIN — GABAPENTIN 800 MG: 400 CAPSULE ORAL at 07:59

## 2019-07-07 RX ADMIN — MEROPENEM 1 G: 1 INJECTION, POWDER, FOR SOLUTION INTRAVENOUS at 09:50

## 2019-07-07 NOTE — PLAN OF CARE
Patient's After Visit Summary was reviewed with patient and/or son  Patient verbalized understanding of After Visit Summary, recommended follow up and was given an opportunity to ask questions.   Discharge medications sent home with patient/family:  Oral vanco   Discharged with son.    Discharge instructions reviewed with patient and son.  Son verbalizes his understanding.  discontinue to home per order.  Escorted to main entrance in wheelchair.      OBSERVATION patient END time: 15:39 PM

## 2019-07-07 NOTE — DISCHARGE SUMMARY
[facility]  Hospitalist Discharge Summary       Date of Admission:  7/4/2019  Date of Discharge:  7/7/2019  Discharging Provider: Juliano Mckeon MD      Discharge Diagnoses   Generalized weakness. UTI due to klebsiella and ESBL E Coli    Follow-ups Needed After Discharge   Follow-up Appointments     Follow-up and recommended labs and tests       Follow up with primary care provider, Marcy Soares, within 7 days   for hospital follow- up.  Follow-up on Chem 7 to check potassium levels           Follow up with primary care provider, Marcy Soares, within 7 days for hospital follow- up.  Follow-up on Chem 7 to check potassium levels. There was a left pelvic cyst seen incidentally on your CAT scan. Please discuss this with your doctor and discuss obtaining a transvaginal ultrasound to further assess this. Call Infectious Disease for follow-up on your recurrent urinary tract infections (Dr. Camejo at [facility] [phone])    Unresulted Labs Ordered in the Past 30 Days of this Admission     Date and Time Order Name Status Description    7/4/2019 1001 Blood culture ONE site Preliminary     7/4/2019 1001 Blood culture ONE site Preliminary       These results will be followed up by hospitalist group    Discharge Disposition   Discharged to home  Condition at discharge: Stable    Hospital Course       Daya Ignacio is a 88 year old woman with past medical history significant for chronic back pain, 2 recent hospitalizations for ESBL UTI, acute GI bleed, and recent C. difficile treatment.  Patient was recently hospitalized 5/26 through 5/31 for ESBL UTI treated with Bactrim and again 6/10 through 6/16 for ESBL UTI (now resistant to Bactrim and treated with meropenem) and has C. difficile colitis treated with oral vancomycin.  Patient completed the oral vancomycin a few days ago.    Patient admitted for weakness, UTI. Will need to wait for cultures.     Urinary tract infection with recent  ESBL E. coli urinary tract infections    - final urine and blood cultures are pending    - on IV meropenem    - final culture shows Klebsiella and ESBL E coli    - patient has had 10 doses of meropenem. She will receive one more this afternoon    - I did [place] Dr. Camejo who reviewed her culture results. He feels after this afternoon's dose she should be completely treated. Recommends 3 more days of the oral vanco for her recent c diff.     Left pelvic cyst    - Noted incidentally on CT scan    - follow-up with PCP/OB as outpt with TVUS - this was noted in discharge instructions     I will call her son and update him. Patient in chair and feels well today. SHe is eating. Already ambulated. Still feels a little weak, but she is getting back to her baseline. Feels comfortable with plan for discharge.    Consultations This Hospital Stay   CARE COORDINATOR IP CONSULT    Code Status   DNR/DNI    Time Spent on this Encounter   I, Juliano Mckeon, personally saw the patient today and spent greater than 30 minutes discharging this patient.       Juliano Mckeon MD  [facility]  ______________________________________________________________________    Physical Exam   Vital Signs: Temp: 97.8  F (36.6  C) Temp src: Oral BP: 171/75 Pulse: 64   Resp: 20 SpO2: 98 % O2 Device: None (Room air)    Weight: 119 lbs 0 oz  Constitutional: awake, alert, cooperative, no apparent distress, and appears stated age  Eyes: Lids and lashes normal, pupils equal, round and reactive to light, extra ocular muscles intact, sclera clear, conjunctiva normal  Respiratory: No increased work of breathing, good air exchange, clear to auscultation bilaterally, no crackles or wheezing  Cardiovascular: Normal apical impulse, regular rate and rhythm, normal S1 and S2, no S3 or S4, and no murmur noted  GI: No scars, normal bowel sounds, soft, non-distended, non-tender, no masses palpated, no hepatosplenomegally  Skin: no bruising or bleeding        Primary Care Physician   Marcy Soares    Discharge Orders      Reason for your hospital stay    Urinary tract infection with resistant organisms. Generalized weakness     Follow-up and recommended labs and tests     Follow up with primary care provider, Marcy Soares, within 7 days for hospital follow- up.  Follow-up on Chem 7 to check potassium levels     Activity    Your activity upon discharge: activity as tolerated     Diet    Follow this diet upon discharge: Regular       Significant Results and Procedures   Most Recent 3 CBC's:  Recent Labs   Lab Test 07/07/19  0617 07/06/19  0541 07/05/19  0606   WBC 3.8* 4.9 7.3   HGB 9.4* 10.4* 10.5*   MCV 95 95 95    202 223     Most Recent 3 BMP's:  Recent Labs   Lab Test 07/07/19  0617 07/06/19  0541 07/05/19  1740 07/05/19  0606    138  --  138   POTASSIUM 3.3* 3.6 4.0 3.2*   CHLORIDE 108 108  --  108   CO2 28 25  --  25   BUN 10 11  --  14   CR 0.45* 0.40*  --  0.49*   ANIONGAP 4 5  --  5   DAVE 8.5 8.5  --  8.5   GLC 93 91  --  83     Most Recent 2 LFT's:  Recent Labs   Lab Test 05/26/19  1531 02/19/19  1022   AST 24 13   ALT 17 15   ALKPHOS 83 65   BILITOTAL 0.6 0.7     Most Recent Urinalysis:  Recent Labs   Lab Test 07/04/19  0854  09/06/17  1525   COLOR Light Yellow   < > Yellow   APPEARANCE Slightly Cloudy   < > Cloudy   URINEGLC Negative   < > Negative   URINEBILI Negative   < > Negative   URINEKETONE 20*   < > Negative   SG 1.008   < > 1.015   UBLD Trace*   < > Moderate*   URINEPH 6.5   < > 6.0   PROTEIN 50*   < > 100*   UROBILINOGEN  --   --  0.2   NITRITE Positive*   < > Negative   LEUKEST Moderate*   < > Large*   RBCU 2   < > 10-25*   WBCU 51*   < > >100*    < > = values in this interval not displayed.   ,   Results for orders placed or performed during the hospital encounter of 07/04/19   Abd/pelvis CT no contrast - Stone Protocol    Narrative    CT ABDOMEN PELVIS WITHOUT CONTRAST 7/4/2019 10:38 AM    TECHNIQUE: Images from diaphragm  to pubic symphysis without IV  contrast.  Radiation dose for this scan was reduced using automated exposure  control, adjustment of the mA and/or kV according to patient size, or  iterative reconstruction technique.    HISTORY: Multiple ESBL urinary tract infection, evaluate for anatomic  abnormality.    COMPARISON: None.    FINDINGS:   Abdomen and Pelvis: No evidence for hydronephrosis or urinary tract  calculi. The bladder is unremarkable where visualized but partially  obscured by streak artifact from bilateral proximal femur surgery.    There is a 3.5 cm left pelvic cyst on series 2 image 45, indeterminate  but suspected to be of ovarian origin. Recommend correlation with  pelvic ultrasound given the patient's postmenopausal age for further  evaluation. No free fluid.    Lung bases clear. Elevated right hemidiaphragm. Within the limits of a  noncontrast exam the liver, spleen, pancreas, adrenal glands and  kidneys demonstrate no acute or worrisome abnormality. Tiny  gallstones. Dense atherosclerotic vascular calcifications without  aortic aneurysm. No periaortic or pelvic adenopathy. No  acute-appearing bowel abnormality demonstrated. Appendix is not  visualized, consequently not evaluated.    Multiple lower thoracic and lumbar vertebral compressions and old  right pubic bone fracture. Left femoral neck pinning and right total  hip arthroplasty.      Impression    IMPRESSION:   1. No acute-appearing abnormality.  2. 3.5 cm left pelvic cyst which would be consistent with an ovarian  cystic lesion. Follow-up pelvic ultrasound for further evaluation  recommended.  3. Small gallstones, no gallbladder distention or evidence for  gallbladder wall thickening.  4. No evidence for hydronephrosis or urinary tract calculi.                 ALIS CONNER MD       Discharge Medications   Current Discharge Medication List      CONTINUE these medications which have CHANGED    Details   vancomycin (VANCOCIN) 125 MG capsule  Take 1 capsule (125 mg) by mouth 4 times daily for 3 days  Qty: 12 capsule, Refills: 0    Associated Diagnoses: C. difficile colitis         CONTINUE these medications which have NOT CHANGED    Details   amLODIPine (NORVASC) 10 MG tablet Take 1 tablet (10 mg) by mouth daily  Qty: 90 tablet, Refills: 1    Associated Diagnoses: Non-STEMI (non-ST elevated myocardial infarction) (H)      Calcium Carbonate (CALCIUM 600 PO) Take 1 tablet by mouth 2 times daily       Cholecalciferol (VITAMIN D3) 2000 UNITS CAPS Take 2,000 Units by mouth daily (with dinner)      cloNIDine (CATAPRES) 0.1 MG tablet TAKE ONE TABLET BY MOUTH TWO TIMES A DAY  Qty: 180 tablet, Refills: 0    Associated Diagnoses: Essential hypertension      estradiol (ESTRACE) 0.1 MG/GM vaginal cream Place 2 g vaginally three times a week paraben-free  Qty: 6 g, Refills: 3    Associated Diagnoses: Symptomatic menopausal or female climacteric states      fish oil-omega-3 fatty acids 1000 MG capsule Take 1 g by mouth daily      !! gabapentin (NEURONTIN) 800 MG tablet Take 800 mg by mouth 2 times daily In AM and afternoon      !! gabapentin (NEURONTIN) 800 MG tablet Take 1,600 mg by mouth At Bedtime      ibuprofen (ADVIL/MOTRIN) 200 MG tablet Take 400 mg by mouth every 4 hours as needed for mild pain      isosorbide dinitrate (ISORDIL) 10 MG tablet Take 1 tablet (10 mg) by mouth 3 times daily  Qty: 270 tablet, Refills: 3    Associated Diagnoses: Non-STEMI (non-ST elevated myocardial infarction) (H)      lisinopril (PRINIVIL/ZESTRIL) 40 MG tablet Take 1 tablet (40 mg) by mouth daily  Qty: 90 tablet, Refills: 1    Associated Diagnoses: Essential hypertension      metoprolol succinate ER (TOPROL-XL) 100 MG 24 hr tablet Take 1 tablet (100 mg) by mouth daily  Qty: 90 tablet, Refills: 1    Associated Diagnoses: Essential hypertension      Multiple Vitamins-Minerals (PRESERVISION AREDS) CAPS Take 1 capsule by mouth daily       sertraline (ZOLOFT) 100 MG tablet Take 1 tablet  (100 mg) by mouth daily  Qty: 90 tablet, Refills: 1    Associated Diagnoses: Generalized anxiety disorder      vitamin C (ASCORBIC ACID) 500 MG tablet Take 500 mg by mouth daily       !! - Potential duplicate medications found. Please discuss with provider.        Allergies   Allergies   Allergen Reactions     Atorvastatin Muscle Pain (Myalgia)     Macrobid [Nitrofurantoin Anhydrous] Other (See Comments)     Body aches     Sulfa Drugs

## 2019-07-07 NOTE — PLAN OF CARE
Vital signs:  Temp: 97.8  F (36.6  C) Temp src: Oral BP: 135/65 Pulse: 58   Resp: 16 SpO2: 94 %  Patient alert and oriented, VSS . UP with SBA and walker.  Back pain, had 400 mg  Ibuprofen, heating pad and lodocaine patch used .IV  saline locked. On Merrem for UTI and oral vanco for c-diff.  Will continue to monitor and provide cares.

## 2019-07-07 NOTE — PLAN OF CARE
Patient alert and oriented, forgetful  Peripheral IV saline locked  VSS, on room air  Up assist standby with walker  Pain controlled with oral tylenol and heating pad  Tolerating diet, no nausea  Clear lungs  Positive BS, no BM this shift  Voiding frequently without issue  Plan to discharge back to [facility] with son transporting  Continue with plan of care

## 2019-07-07 NOTE — PROGRESS NOTES
SW was notified patient is returning home today with resumption of care. SW emailed [facility] to update them. No further Social Work needs identified. If further needs arise, please alert SW department by placing another consult order. Thank You      ROSE Gaviria  [phone]

## 2019-07-07 NOTE — PROGRESS NOTES
"Pt ambulated in the hallway SBA with walker.C/o of low back pain, and feeling \"a little dizzy\".  "

## 2019-07-07 NOTE — PLAN OF CARE
"PRIMARY DIAGNOSIS: UTI  OUTPATIENT/OBSERVATION GOALS TO BE MET BEFORE DISCHARGE:  1. Stable vital signs Yes  2. Tolerating diet:Yes  3. Pain controlled with oral pain medications:  Yes  4. Positive bowel sounds:  Yes  5. Voiding without difficulty:  Yes, patient however does complaint of frequency  6. Able to ambulate:  Yes, with aid of walker, standby assistance  7. Provider specific discharge goals met:  No    Blood pressure 136/58, pulse 64, temperature 97.9  F (36.6  C), temperature source Oral, resp. rate 16, height 1.549 m (5' 1\"), weight 54 kg (119 lb), SpO2 96 %, not currently breastfeeding.    VSS  Lung sounds clear, adequate sats on room air.  Patient c/o of right lower back pain, rating her pain a 6-8 on a 1-10 scale.  Lidocaine patch placed.  Patient using heating pad for additional comfort.  Patient up with standby assistance of one, ambulating to the bathroom using walker for additional support.  Plan:  Continue to provide supportive cares.  U/C pending, possible ID consult.      Discharge Planner Nurse   Safe discharge environment identified: Yes  Barriers to discharge: Yes, unless medically cleared.           Entered by: Karen M. Lesch 07/06/2019 8:55 PM     Please review provider order for any additional goals.   Nurse to notify provider when observation goals have been met and patient is ready for discharge.  "

## 2019-07-08 ENCOUNTER — APPOINTMENT (OUTPATIENT)
Dept: ULTRASOUND IMAGING | Facility: CLINIC | Age: 84
End: 2019-07-08
Attending: EMERGENCY MEDICINE
Payer: MEDICARE

## 2019-07-08 ENCOUNTER — TELEPHONE (OUTPATIENT)
Dept: FAMILY MEDICINE | Facility: CLINIC | Age: 84
End: 2019-07-08

## 2019-07-08 ENCOUNTER — HOSPITAL ENCOUNTER (EMERGENCY)
Facility: CLINIC | Age: 84
Discharge: HOME | End: 2019-07-08
Attending: EMERGENCY MEDICINE | Admitting: EMERGENCY MEDICINE
Payer: MEDICARE

## 2019-07-08 VITALS
RESPIRATION RATE: 18 BRPM | DIASTOLIC BLOOD PRESSURE: 73 MMHG | HEART RATE: 64 BPM | SYSTOLIC BLOOD PRESSURE: 191 MMHG | OXYGEN SATURATION: 90 % | WEIGHT: 122 LBS | BODY MASS INDEX: 23.05 KG/M2 | TEMPERATURE: 99.2 F

## 2019-07-08 DIAGNOSIS — R10.30 INGUINAL PAIN, UNSPECIFIED LATERALITY: ICD-10-CM

## 2019-07-08 DIAGNOSIS — R11.0 NAUSEA: ICD-10-CM

## 2019-07-08 LAB
ALBUMIN UR-MCNC: NEGATIVE MG/DL
ANION GAP SERPL CALCULATED.3IONS-SCNC: 4 MMOL/L (ref 3–14)
APPEARANCE UR: CLEAR
BASOPHILS # BLD AUTO: 0 10E9/L (ref 0–0.2)
BASOPHILS NFR BLD AUTO: 0.9 %
BILIRUB UR QL STRIP: NEGATIVE
BUN SERPL-MCNC: 13 MG/DL (ref 7–30)
CALCIUM SERPL-MCNC: 9 MG/DL (ref 8.5–10.1)
CHLORIDE SERPL-SCNC: 103 MMOL/L (ref 94–109)
CO2 SERPL-SCNC: 29 MMOL/L (ref 20–32)
COLOR UR AUTO: ABNORMAL
CREAT SERPL-MCNC: 0.46 MG/DL (ref 0.52–1.04)
DIFFERENTIAL METHOD BLD: ABNORMAL
EOSINOPHIL # BLD AUTO: 0.1 10E9/L (ref 0–0.7)
EOSINOPHIL NFR BLD AUTO: 3.1 %
ERYTHROCYTE [DISTWIDTH] IN BLOOD BY AUTOMATED COUNT: 16.3 % (ref 10–15)
GFR SERPL CREATININE-BSD FRML MDRD: 88 ML/MIN/{1.73_M2}
GLUCOSE SERPL-MCNC: 91 MG/DL (ref 70–99)
GLUCOSE UR STRIP-MCNC: NEGATIVE MG/DL
HCT VFR BLD AUTO: 34.5 % (ref 35–47)
HGB BLD-MCNC: 11.2 G/DL (ref 11.7–15.7)
HGB UR QL STRIP: NEGATIVE
IMM GRANULOCYTES # BLD: 0 10E9/L (ref 0–0.4)
IMM GRANULOCYTES NFR BLD: 0.9 %
KETONES UR STRIP-MCNC: NEGATIVE MG/DL
LEUKOCYTE ESTERASE UR QL STRIP: ABNORMAL
LYMPHOCYTES # BLD AUTO: 0.9 10E9/L (ref 0.8–5.3)
LYMPHOCYTES NFR BLD AUTO: 20.1 %
MCH RBC QN AUTO: 30.4 PG (ref 26.5–33)
MCHC RBC AUTO-ENTMCNC: 32.5 G/DL (ref 31.5–36.5)
MCV RBC AUTO: 94 FL (ref 78–100)
MONOCYTES # BLD AUTO: 0.5 10E9/L (ref 0–1.3)
MONOCYTES NFR BLD AUTO: 12.1 %
NEUTROPHILS # BLD AUTO: 2.8 10E9/L (ref 1.6–8.3)
NEUTROPHILS NFR BLD AUTO: 62.9 %
NITRATE UR QL: NEGATIVE
NRBC # BLD AUTO: 0 10*3/UL
NRBC BLD AUTO-RTO: 0 /100
PH UR STRIP: 7 PH (ref 5–7)
PLATELET # BLD AUTO: 189 10E9/L (ref 150–450)
POTASSIUM SERPL-SCNC: 3.7 MMOL/L (ref 3.4–5.3)
RBC # BLD AUTO: 3.69 10E12/L (ref 3.8–5.2)
RBC #/AREA URNS AUTO: 1 /HPF (ref 0–2)
SODIUM SERPL-SCNC: 136 MMOL/L (ref 133–144)
SOURCE: ABNORMAL
SP GR UR STRIP: 1.01 (ref 1–1.03)
SQUAMOUS #/AREA URNS AUTO: 1 /HPF (ref 0–1)
UROBILINOGEN UR STRIP-MCNC: NORMAL MG/DL (ref 0–2)
WBC # BLD AUTO: 4.5 10E9/L (ref 4–11)
WBC #/AREA URNS AUTO: 1 /HPF (ref 0–5)

## 2019-07-08 PROCEDURE — 25000132 ZZH RX MED GY IP 250 OP 250 PS 637: Mod: GY | Performed by: EMERGENCY MEDICINE

## 2019-07-08 PROCEDURE — 81001 URINALYSIS AUTO W/SCOPE: CPT | Performed by: EMERGENCY MEDICINE

## 2019-07-08 PROCEDURE — 76830 TRANSVAGINAL US NON-OB: CPT

## 2019-07-08 PROCEDURE — 80048 BASIC METABOLIC PNL TOTAL CA: CPT | Performed by: EMERGENCY MEDICINE

## 2019-07-08 PROCEDURE — 99284 EMERGENCY DEPT VISIT MOD MDM: CPT | Mod: 25

## 2019-07-08 PROCEDURE — 85025 COMPLETE CBC W/AUTO DIFF WBC: CPT | Performed by: EMERGENCY MEDICINE

## 2019-07-08 RX ORDER — IBUPROFEN 200 MG
400 TABLET ORAL ONCE
Status: COMPLETED | OUTPATIENT
Start: 2019-07-08 | End: 2019-07-08

## 2019-07-08 RX ADMIN — IBUPROFEN 400 MG: 200 TABLET, FILM COATED ORAL at 06:44

## 2019-07-08 ASSESSMENT — ENCOUNTER SYMPTOMS
FREQUENCY: 0
VOMITING: 0
DIARRHEA: 0
WEAKNESS: 0
SHORTNESS OF BREATH: 0
DYSURIA: 0
NAUSEA: 1
CONSTIPATION: 0

## 2019-07-08 NOTE — ED TRIAGE NOTES
Pt arrives via EMS with groin pain from diagnosed UTI.  Pt was discharged yesterday but feels the pain is worse.  4 zofran given via EMS for nausea

## 2019-07-08 NOTE — ED NOTES
Bed: ED13  Expected date:   Expected time:   Means of arrival:   Comments:  Hx of uti not getting better

## 2019-07-08 NOTE — TELEPHONE ENCOUNTER
Pt qualifies for SN/PT/OT visits. Requests orders.   SN  For resumption of care after hospital stay. DX: tx of c-diff, complext uti and to eval for physical/occupational therapy.     Informed approved per standing orders.   Home Care staff will fax these orders for Dr. Omer to sign.     Devika Herrera RN Flex

## 2019-07-08 NOTE — ED PROVIDER NOTES
History     Chief Complaint:  UTI      HPI   Daya Ignacio is a 88 year old female with a history of UTI d/t extended-spectrum ESBL-producing Klebsiella, chronic pain, and C/ Diff colitis who presents to the emergency department for evaluation of a UTI. Patient was admitted from the emergency department 7/04 for generalized weakness secondary to ESBL and klebsiella UTI. Patient has had two other recent hospitalizations for ESBL UTI, acute GI bleed, and recent C. Diff treatment. Upon most recent admission, it was noted that patient's ESBL UTI was resistant to Bactrim, which she was treated with the first time (5/31)--she was treated with Meropenem during her second hospitalization. Patient has also had a course of PO Vancomycin for her C. Diff. This hospitalization, patient was given IV meropenem with final cultures showing Klebsiella and ESBL E. Coli. She received 11 doses of meropenem, and was recommended three more PO Vancomycin doses for her C. Diff. CT scan showed 3.5cm left pelvic cyst, and patient was recommended follow up ultrasound--she is not aware of this on evaluation today (see below). She was discharged to home 7/07. Last night, patient states that she awoke with significant mid-groin pain and became concerned for a UTI again.  She has had this pain every time she had a UTI in the past. Also endorses nausea. Denies dysuria, trouble breathing, weakness, diarrhea, constipation, and urinary frequency. No fall since discharge.      CT Abdomen Pelvis w/o Contrast  IMPRESSION:   1. No acute-appearing abnormality.  2. 3.5 cm left pelvic cyst which would be consistent with an ovarian  cystic lesion. Follow-up pelvic ultrasound for further evaluation  recommended.  3. Small gallstones, no gallbladder distention or evidence for  gallbladder wall thickening.  4. No evidence for hydronephrosis or urinary tract calculi.    Report per radiology     Allergies:  Atorvastatin  Macrobid [Nitrofurantoin  Anhydrous]  Sulfa Drugs      Medications:    Amlodipine   Estradiol cream   Clonidine   Gabapentin   Isosorbide   Lisinopril   Sertraline   Vancomycin    Metoprolol      Past Medical History:    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  Closed fracture of neck of feur   Closed fracture of vertebral column without mention of spinal cord injury   C. Diff colitis   Hypertension   Upper GI bleed  Macular degeneration of retina  UTI d/t extended-spectrum ESBL-producing Klebsiella   Back pain   Hypokalemia   Diarrhea  NSTEMI   Chronic pain  Generalized anxiety disorder   Vitamin D deficiency   Hyperlipidemia      Past Surgical History:    Left hip pain   EGD Combined   Orthopedic surgery     Family History:    Mother -Alzheimer's Disease  Father - cardiovascular   Brother - Parkinsonism     Social History:  The patient was accompanied to the ED by EMS.  Smoking Status: Never  Smokeless Tobacco: Never  Alcohol Use: No   Marital Status:  Single      Review of Systems   Respiratory: Negative for shortness of breath.    Gastrointestinal: Positive for nausea. Negative for constipation, diarrhea and vomiting.   Genitourinary: Positive for pelvic pain. Negative for dysuria and frequency.   Neurological: Negative for weakness.   All other systems reviewed and are negative.        Physical Exam     Patient Vitals for the past 24 hrs:   BP Temp Temp src Heart Rate Resp SpO2 Weight   07/08/19 0603 191/73 99.2  F (37.3  C) Oral 64 18 97 % 55.3 kg (122 lb)        Physical Exam  Gen: in NAD  HEENT:  mmm,   Neck: supple, no abnormal swelling or tenderness  Lungs:  CTAB,  no resp distress  CV: rrr, no m/r/g, ppi  Abd: soft, mild ttp suprapubic space, nondistended, no rebound/masses/guarding/hsm  Ext: no peripheral edema, no pain with SLR or int/ext rotation  Skin: warm, dry, well perfused, no rashes/bruising/lesions on exposed skin  Neuro: alert, MAEE, no gross motor or sensory deficits, gait stable  Psych: Normal mood, normal  affect    Emergency Department Course     Imaging:  Radiology findings were communicated with the patient who voiced understanding of the findings.    US Pelvic Complete with Transvaginal  IMPRESSION:    1. Left ovarian 3.6 cm simple cyst.  2. Right ovary nonvisualization.  Report per radiology     Laboratory:  Laboratory findings were communicated with the patient who voiced understanding of the findings.    CBC: HGB 11.2 (L) o/w WNL. (WBC 4.5, )   BMP: Creatinine 0.46 (L) o/w WNL    UA: Straw colored, clear urine. Leukocyte Esterase Urine trace o/w WNL     Interventions:  0644 - Advil 400mg PO      Emergency Department Course:  Nursing notes and vitals reviewed.  The patient provided a urine sample here in the emergency department. This was sent for laboratory testing, findings above.  IV was inserted and blood was drawn for laboratory testing, results above.  The patient was sent for a US Pelvic Complete with Transvaginal while in the emergency department, results above.     0635: I performed an exam of the patient as documented above.     0817: Patient rechecked and updated.      Findings and plan explained to the Patient. Patient discharged home with instructions regarding supportive care, medications, and reasons to return. The importance of close follow-up was reviewed.     I personally reviewed the laboratory and imaging results with the Patient and answered all related questions prior to discharge.    Impression & Plan      Medical Decision Makin-year-old female recently discharged from our hospital after being treated for generalized weakness and urinary tract infection growing out both Klebsiella and ESBL on culture finished meropenem in the hospital continues on Vanco for C. difficile prophylaxis.  Felt some pain in her groin this morning primarily on the right side and suprapubic causing her presentation again.  Had some nausea but did not vomit has not had any loose stools or diarrhea or  recurrence of fever.  Has no dysuria or new incontinence.  Concerns here for recurrent urinary tract infection, ureteral stone, ovarian pathology given the CT scan done a few days ago on her initial ER presentation showing an ovarian mass.  She has a benign examination here ultrasound of the pelvis was done showing a simple appearing ovarian cyst.  Urine shows no signs of ongoing infection basic blood test otherwise unremarkable.  I have a low suspicion this is an alternative intra-abdominal vascular or surgical catastrophe or referred radiculopathy or musculoskeletal etiology.  I think she can safely be discharged home and declare herself clinically rather than pursuing a repeat CT at this point given her story and exam or hospitalization.    Diagnosis:    ICD-10-CM   1. Inguinal pain, unspecified laterality R10.30   2. Nausea R11.0       Disposition:  Discharged to home.     Scribe Disclosure:  I, Korin Dominguez, am serving as a scribe at 6:35 AM on 7/8/2019 to document services personally performed by Vince Sepulveda MD based on my observations and the provider's statements to me.   7/8/2019   [facility] EMERGENCY DEPARTMENT       Vince Sepulveda MD  07/08/19 7404

## 2019-07-08 NOTE — ED AVS SNAPSHOT
[facility] Emergency Department  [address]  [facility] [address]  Phone:  [phone]  Fax:  [phone]                                    Daya Ignacio   MRN: [id number]    Department:  [facility] Emergency Department   Date of Visit:  7/8/2019           After Visit Summary Signature Page    I have received my discharge instructions, and my questions have been answered. I have discussed any challenges I see with this plan with the nurse or doctor.    ..........................................................................................................................................  Patient/Patient Representative Signature      ..........................................................................................................................................  Patient Representative Print Name and Relationship to Patient    ..................................................               ................................................  Date                                   Time    ..........................................................................................................................................  Reviewed by Signature/Title    ...................................................              ..............................................  Date                                               Time          22EPIC Rev 08/18

## 2019-07-11 ENCOUNTER — PATIENT OUTREACH (OUTPATIENT)
Dept: CARE COORDINATION | Facility: CLINIC | Age: 84
End: 2019-07-11

## 2019-07-11 ENCOUNTER — TELEPHONE (OUTPATIENT)
Dept: FAMILY MEDICINE | Facility: CLINIC | Age: 84
End: 2019-07-11

## 2019-07-11 ENCOUNTER — HOSPITAL ENCOUNTER (EMERGENCY)
Facility: CLINIC | Age: 84
Discharge: HOME | End: 2019-07-11
Attending: EMERGENCY MEDICINE | Admitting: EMERGENCY MEDICINE
Payer: MEDICARE

## 2019-07-11 VITALS
WEIGHT: 116 LBS | TEMPERATURE: 98 F | DIASTOLIC BLOOD PRESSURE: 87 MMHG | HEIGHT: 61 IN | HEART RATE: 91 BPM | RESPIRATION RATE: 20 BRPM | BODY MASS INDEX: 21.9 KG/M2 | SYSTOLIC BLOOD PRESSURE: 179 MMHG | OXYGEN SATURATION: 100 %

## 2019-07-11 DIAGNOSIS — N30.00 ACUTE CYSTITIS WITHOUT HEMATURIA: ICD-10-CM

## 2019-07-11 LAB
ALBUMIN UR-MCNC: NEGATIVE MG/DL
AMORPH CRY #/AREA URNS HPF: ABNORMAL /HPF
ANION GAP SERPL CALCULATED.3IONS-SCNC: 4 MMOL/L (ref 3–14)
APPEARANCE UR: ABNORMAL
BASOPHILS # BLD AUTO: 0 10E9/L (ref 0–0.2)
BASOPHILS NFR BLD AUTO: 0.5 %
BILIRUB UR QL STRIP: NEGATIVE
BUN SERPL-MCNC: 18 MG/DL (ref 7–30)
CALCIUM SERPL-MCNC: 9.2 MG/DL (ref 8.5–10.1)
CHLORIDE SERPL-SCNC: 101 MMOL/L (ref 94–109)
CO2 SERPL-SCNC: 32 MMOL/L (ref 20–32)
COLOR UR AUTO: ABNORMAL
CREAT SERPL-MCNC: 0.51 MG/DL (ref 0.52–1.04)
DIFFERENTIAL METHOD BLD: ABNORMAL
EOSINOPHIL # BLD AUTO: 0.1 10E9/L (ref 0–0.7)
EOSINOPHIL NFR BLD AUTO: 1.5 %
ERYTHROCYTE [DISTWIDTH] IN BLOOD BY AUTOMATED COUNT: 15.9 % (ref 10–15)
GFR SERPL CREATININE-BSD FRML MDRD: 86 ML/MIN/{1.73_M2}
GLUCOSE SERPL-MCNC: 97 MG/DL (ref 70–99)
GLUCOSE UR STRIP-MCNC: NEGATIVE MG/DL
HCT VFR BLD AUTO: 37.1 % (ref 35–47)
HGB BLD-MCNC: 12.3 G/DL (ref 11.7–15.7)
HGB UR QL STRIP: NEGATIVE
IMM GRANULOCYTES # BLD: 0.1 10E9/L (ref 0–0.4)
IMM GRANULOCYTES NFR BLD: 0.8 %
KETONES UR STRIP-MCNC: NEGATIVE MG/DL
LEUKOCYTE ESTERASE UR QL STRIP: ABNORMAL
LYMPHOCYTES # BLD AUTO: 1.3 10E9/L (ref 0.8–5.3)
LYMPHOCYTES NFR BLD AUTO: 19.3 %
MCH RBC QN AUTO: 31.1 PG (ref 26.5–33)
MCHC RBC AUTO-ENTMCNC: 33.2 G/DL (ref 31.5–36.5)
MCV RBC AUTO: 94 FL (ref 78–100)
MONOCYTES # BLD AUTO: 0.7 10E9/L (ref 0–1.3)
MONOCYTES NFR BLD AUTO: 10.8 %
MUCOUS THREADS #/AREA URNS LPF: PRESENT /LPF
NEUTROPHILS # BLD AUTO: 4.3 10E9/L (ref 1.6–8.3)
NEUTROPHILS NFR BLD AUTO: 67.1 %
NITRATE UR QL: NEGATIVE
NRBC # BLD AUTO: 0 10*3/UL
NRBC BLD AUTO-RTO: 0 /100
PH UR STRIP: 7 PH (ref 5–7)
PLATELET # BLD AUTO: 243 10E9/L (ref 150–450)
POTASSIUM SERPL-SCNC: 3.3 MMOL/L (ref 3.4–5.3)
RBC # BLD AUTO: 3.96 10E12/L (ref 3.8–5.2)
RBC #/AREA URNS AUTO: 3 /HPF (ref 0–2)
SODIUM SERPL-SCNC: 137 MMOL/L (ref 133–144)
SOURCE: ABNORMAL
SP GR UR STRIP: 1.01 (ref 1–1.03)
SQUAMOUS #/AREA URNS AUTO: 1 /HPF (ref 0–1)
TRANS CELLS #/AREA URNS HPF: 2 /HPF (ref 0–1)
UROBILINOGEN UR STRIP-MCNC: NORMAL MG/DL (ref 0–2)
WBC # BLD AUTO: 6.5 10E9/L (ref 4–11)
WBC #/AREA URNS AUTO: 20 /HPF (ref 0–5)

## 2019-07-11 PROCEDURE — 25000131 ZZH RX MED GY IP 250 OP 636 PS 637: Mod: GY | Performed by: EMERGENCY MEDICINE

## 2019-07-11 PROCEDURE — 80048 BASIC METABOLIC PNL TOTAL CA: CPT | Performed by: EMERGENCY MEDICINE

## 2019-07-11 PROCEDURE — 99285 EMERGENCY DEPT VISIT HI MDM: CPT | Mod: 25

## 2019-07-11 PROCEDURE — 25000132 ZZH RX MED GY IP 250 OP 250 PS 637: Mod: GY | Performed by: EMERGENCY MEDICINE

## 2019-07-11 PROCEDURE — 85025 COMPLETE CBC W/AUTO DIFF WBC: CPT | Performed by: EMERGENCY MEDICINE

## 2019-07-11 PROCEDURE — 96375 TX/PRO/DX INJ NEW DRUG ADDON: CPT

## 2019-07-11 PROCEDURE — 81001 URINALYSIS AUTO W/SCOPE: CPT | Performed by: EMERGENCY MEDICINE

## 2019-07-11 PROCEDURE — 87086 URINE CULTURE/COLONY COUNT: CPT | Performed by: EMERGENCY MEDICINE

## 2019-07-11 PROCEDURE — 25000128 H RX IP 250 OP 636: Performed by: EMERGENCY MEDICINE

## 2019-07-11 PROCEDURE — 96365 THER/PROPH/DIAG IV INF INIT: CPT

## 2019-07-11 RX ORDER — OXYCODONE HYDROCHLORIDE 5 MG/1
5 TABLET ORAL 3 TIMES DAILY PRN
Qty: 10 TABLET | Refills: 0 | Status: ON HOLD | OUTPATIENT
Start: 2019-07-11 | End: 2019-07-17

## 2019-07-11 RX ORDER — MEROPENEM 1 G/1
1 INJECTION, POWDER, FOR SOLUTION INTRAVENOUS ONCE
Status: COMPLETED | OUTPATIENT
Start: 2019-07-11 | End: 2019-07-11

## 2019-07-11 RX ORDER — SULFAMETHOXAZOLE/TRIMETHOPRIM 800-160 MG
1 TABLET ORAL 2 TIMES DAILY
Qty: 20 TABLET | Refills: 0 | Status: ON HOLD | OUTPATIENT
Start: 2019-07-11 | End: 2019-07-17

## 2019-07-11 RX ORDER — HYDROMORPHONE HYDROCHLORIDE 1 MG/ML
0.5 INJECTION, SOLUTION INTRAMUSCULAR; INTRAVENOUS; SUBCUTANEOUS ONCE
Status: COMPLETED | OUTPATIENT
Start: 2019-07-11 | End: 2019-07-11

## 2019-07-11 RX ORDER — POTASSIUM CHLORIDE 1500 MG/1
20 TABLET, EXTENDED RELEASE ORAL ONCE
Status: COMPLETED | OUTPATIENT
Start: 2019-07-11 | End: 2019-07-11

## 2019-07-11 RX ORDER — OXYCODONE HYDROCHLORIDE 5 MG/1
5 TABLET ORAL ONCE
Status: COMPLETED | OUTPATIENT
Start: 2019-07-11 | End: 2019-07-11

## 2019-07-11 RX ORDER — PHENAZOPYRIDINE HYDROCHLORIDE 100 MG/1
200 TABLET, FILM COATED ORAL ONCE
Status: COMPLETED | OUTPATIENT
Start: 2019-07-11 | End: 2019-07-11

## 2019-07-11 RX ORDER — SENNOSIDES A AND B 8.6 MG/1
1 TABLET, FILM COATED ORAL 2 TIMES DAILY PRN
Qty: 20 TABLET | Refills: 0 | Status: SHIPPED | OUTPATIENT
Start: 2019-07-11 | End: 2019-07-28

## 2019-07-11 RX ORDER — ONDANSETRON 4 MG/1
4 TABLET, ORALLY DISINTEGRATING ORAL ONCE
Status: COMPLETED | OUTPATIENT
Start: 2019-07-11 | End: 2019-07-11

## 2019-07-11 RX ORDER — PHENAZOPYRIDINE HYDROCHLORIDE 200 MG/1
200 TABLET, FILM COATED ORAL 3 TIMES DAILY PRN
Qty: 9 TABLET | Refills: 0 | Status: ON HOLD | OUTPATIENT
Start: 2019-07-11 | End: 2019-07-17

## 2019-07-11 RX ADMIN — OXYCODONE HYDROCHLORIDE 5 MG: 5 TABLET ORAL at 06:44

## 2019-07-11 RX ADMIN — POTASSIUM CHLORIDE 20 MEQ: 20 TABLET, EXTENDED RELEASE ORAL at 07:50

## 2019-07-11 RX ADMIN — PHENAZOPYRIDINE 200 MG: 100 TABLET ORAL at 07:50

## 2019-07-11 RX ADMIN — ONDANSETRON 4 MG: 4 TABLET, ORALLY DISINTEGRATING ORAL at 06:51

## 2019-07-11 RX ADMIN — MEROPENEM 1 G: 1 INJECTION, POWDER, FOR SOLUTION INTRAVENOUS at 07:50

## 2019-07-11 RX ADMIN — HYDROMORPHONE HYDROCHLORIDE 0.5 MG: 1 INJECTION, SOLUTION INTRAMUSCULAR; INTRAVENOUS; SUBCUTANEOUS at 07:50

## 2019-07-11 ASSESSMENT — ENCOUNTER SYMPTOMS
DIARRHEA: 0
FEVER: 0
ABDOMINAL PAIN: 1
VOMITING: 0
NAUSEA: 1

## 2019-07-11 ASSESSMENT — MIFFLIN-ST. JEOR: SCORE: 893.55

## 2019-07-11 NOTE — ED NOTES
Bed: ED09  Expected date: 7/11/19  Expected time: 5:38 AM  Means of arrival:   Comments:  All [age]y fm-

## 2019-07-11 NOTE — TELEPHONE ENCOUNTER
Dang: nurse of home care- [phone]    Pt qualifies for resumption of SN visits. Requests orders.   SN  1 x 1 week  2 x week x 3 weeks  5 PRN visits    Physical therapy eval and treat.     Informed approved per standing orders.   Home Care staff will fax these orders for signature.     Devika Herrera RN Flex

## 2019-07-11 NOTE — ED PROVIDER NOTES
History     Chief Complaint:  Abdominal Pain      HPI   Daya Ignacio is a 88 year old female, with a history of recurrent Urinary tract infections, who presents to the ED for evaluation of abdominal pain. The patient says that early this morning she woke up from pain in the midline lower abdomen. She says the pain feels sharp, and does not feel like a burning pain, but it is constant. The patient says that the pain does not radiate anywhere. Of note, the patient has not urinated yet, but she thinks it is not going to burn. The patient denies diarrhea, vomiting, and fever. With the pain, the patient says she is experiencing some nausea. Also of note, the patient says she has a sore back, but this is chronic and did not come on with the pain she is experiencing in her abdomen. She says that this abdominal pain feels similar to the pain she has experienced with her previous UTIs.  Patient has a history of ESBL UTI in which urine was initially sensitive to Bactrim and treated with Bactrim in May 2019.  She had recurrent infection that was resistant to Bactrim and required hospitalization with IV meropenem.    Allergies:  Atorvastatin: Myalgia  Macrobid: Body aches  Sulfa Drugs    Medications:    Norvasc  Calcium carbonate  Vitamin D3  Catapres  Estrace  Neurontin  Advil  Isordil  Lisinopril  Toprol  Multi-vitamins  Vitamin C    Past Medical History:    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  Closed fracture of neck of femur  Closed fracture of vertebral column   Clostridium difficile colitis  Hypertension  Lumbar compression fracture  Macular degeneration of retina  Upper GI bleed  Urinary tract infection    Past Surgical History:    Orthopedic surgery    Family History:    Alzheimer Disease: Mother  Cardiovascular: Father  Neurologic Disorder: Brother    Social History:  Smoking status: Never Smoker  Alcohol use: No    Review of Systems   Constitutional: Negative for fever.   Gastrointestinal: Positive for  "abdominal pain and nausea. Negative for diarrhea and vomiting.   All other systems reviewed and are negative.      Physical Exam   First Vitals:  Patient Vitals for the past 24 hrs:   BP Temp Temp src Pulse Heart Rate Resp SpO2 Height Weight   07/11/19 0915 179/87 -- -- 91 -- -- 100 % -- --   07/11/19 0900 164/90 -- -- 66 -- -- 100 % -- --   07/11/19 0845 178/80 -- -- 64 -- -- 100 % -- --   07/11/19 0830 195/82 -- -- 65 -- -- 100 % -- --   07/11/19 0815 185/81 -- -- 63 -- -- 99 % -- --   07/11/19 0800 189/81 -- -- 63 -- -- 97 % -- --   07/11/19 0745 183/83 -- -- 63 -- -- -- -- --   07/11/19 0730 171/75 -- -- 62 -- -- -- -- --   07/11/19 0715 189/75 -- -- 62 -- -- 100 % -- --   07/11/19 0700 (!) 161/101 -- -- 64 -- -- 91 % -- --   07/11/19 0600 199/82 -- -- 63 -- -- 99 % -- --   07/11/19 0554 (!) 205/89 98  F (36.7  C) Oral -- 63 20 100 % 1.549 m (5' 1\") 52.6 kg (116 lb)     Physical Exam    Constitutional:  Pleasant, age appropriate.       Resting comfortably in the bed.  HEENT:    Oropharynx is moist.  Eyes:    Conjunctiva normal  Neck:    Supple, no meningismus.     CV:     Regular rate and rhythm.      No murmurs, rubs or gallops.     No lower extremity edema.  PULM:    Clear to auscultation bilateral.       No respiratory distress.      Good air exchange.  ABD:    Soft, non-distended.       No reproducible abdominal tenderness.     Bowel sounds normal.     No pulsatile masses.       No rebound, guarding or rigidity.     No CVA tenderness.   MSK:     No gross deformity to all four extremities.   LYMPH:   No cervical lymphadenopathy.  NEURO:   Alert.  Good muscular tone, no atrophy.   Skin:    Warm, dry and intact.    Psych:    Mood is good and affect is appropriate.      Emergency Department Course     Laboratory:  UA with micro: Leukocyte Esterase Urine Moderate, Transitional Epi 2 (H), Mucous Urine Present, Amorphous crystals Few o/w negative.    CBC + differential:  o/w WNL. (WBC 6.5, HGB 12.3, ) "     BMP: Potassium 3.3 (L), Creatinine 0.51 (L)    Urine Culture (In process)    Interventions:  0644 oxycodone 5 mg PO  0651 Zofran 4 mg PO  0750 potassium chloride 20 mEq  0750 Dilaudid 0.5 mg PO  0750 Pyridium 200 mg PO  0750 Merrem 1 g IV    Emergency Department Course:  Past medical records, nursing notes, and vitals reviewed.  0623: I performed an exam of the patient and obtained history, as documented above.    IV inserted and blood drawn.    0858: I rechecked the patient. Explained findings to patient.    1004: I rechecked the patient.  Findings and plan explained to the Patient. Patient discharged home with instructions regarding supportive care, medications, and reasons to return. The importance of close follow-up was reviewed.       Impression & Plan      Medical Decision Makin-year-old female with a history of ESBL UTI presents to the ED with return of midline low abdominal discomfort.  Her abdominal examination is benign with no significant reproducible tenderness on his exam.  Basic laboratory studies are unremarkable.  Urinalysis is concerning for cystitis with 20 white blood cells and moderate leukocyte esterase.  She was given a single dose of meropenem given her recent history of ESBL UTI requiring hospitalization.  She does not appear bacteremic, septic or have clinical signs of pyelonephritis.  Urine culture sent.  Patient will be discharged home on Bactrim as she has been previously sensitive to Bactrim in May of this year.  Close follow with primary physician for follow up of urine culture  and return ED for any worsening symptoms.      Diagnosis:    ICD-10-CM    1. Acute cystitis without hematuria N30.00 Urine Culture       Disposition:  discharged to home    Discharge Medications:     Medication List      Started    oxyCODONE 5 MG tablet  Commonly known as:  ROXICODONE  5 mg, Oral, 3 TIMES DAILY PRN     phenazopyridine 200 MG tablet  Commonly known as:  PYRIDIUM  200 mg, Oral, 3 TIMES  DAILY PRN     senna 8.6 MG tablet  Commonly known as:  SENOKOT  1 tablet, Oral, 2 TIMES DAILY PRN     sulfamethoxazole-trimethoprim 800-160 MG tablet  Commonly known as:  BACTRIM DS  1 tablet, Oral, 2 TIMES DAILY                  Сергей Berrios  7/11/2019   [facility] EMERGENCY DEPARTMENT  Scribe Disclosure:  I, Сергей Berrios, am serving as a scribe at 6:23 AM on 7/11/2019 to document services personally performed by Colin Rodas MD based on my observations and the provider's statements to me.        Colin Rodas MD  07/11/19 4244

## 2019-07-11 NOTE — ED AVS SNAPSHOT
[facility] Emergency Department  [address]  [facility] [address]  Phone:  [phone]  Fax:  [phone]                                    Daya Ignacio   MRN: [id number]    Department:  [facility] Emergency Department   Date of Visit:  7/11/2019           After Visit Summary Signature Page    I have received my discharge instructions, and my questions have been answered. I have discussed any challenges I see with this plan with the nurse or doctor.    ..........................................................................................................................................  Patient/Patient Representative Signature      ..........................................................................................................................................  Patient Representative Print Name and Relationship to Patient    ..................................................               ................................................  Date                                   Time    ..........................................................................................................................................  Reviewed by Signature/Title    ...................................................              ..............................................  Date                                               Time          22EPIC Rev 08/18

## 2019-07-11 NOTE — RESULT ENCOUNTER NOTE
Emergency Dept/Urgent Care discharge antibiotic (if prescribed): Sulfamethoxazole-Trimethoprim (Bactrim DS, Septra DS) 800-160 mg PO tablet,  1 tablet by mouth 2 times daily for 10 days.  Date of Rx (if applicable):  7/11/19  No changes in treatment per Urine culture protocol.

## 2019-07-11 NOTE — PROGRESS NOTES
Clinic Care Coordination Contact  Care Coordination Communication    Clinical Data: Patient with multiple recent ED/IP admissions.  EMI elevated.  Most recently patient was seen in ED on 7/11.  Patient was also hospitalized at [facility] 7/4-7/11 with diagnosis of generalized weakness and UTI. Patient was discharged home with resumption of home care services.    Patient resides at [facility] and receives no service/meals through them.     Home Care Contact:              Home Care Agency: [facility] RN/PT              Contact name () and phone number: ROBERTA Hall [phone]                Resumed care date: 7/11/19    Per home care charting, they are working on decreasing ED visits.  There are plans for SW to get involved for LTC needs.    Plan: RN/SW Care Coordinator will await notification from home care staff informing RN/SW Care Coordinator of patients discharge plans/needs. RN/SW Care Coordinator will review chart and outreach to home care every 4 weeks and as needed.      Bre Bowie RN  Care Coordination  Phone:  [phone]  Email: [email or web address]

## 2019-07-12 LAB
BACTERIA SPEC CULT: NO GROWTH
Lab: NORMAL
SPECIMEN SOURCE: NORMAL

## 2019-07-12 NOTE — RESULT ENCOUNTER NOTE
Final urine culture report is NEGATIVE per [place] ED Lab Result protocol.    If NEGATIVE result, no change in treatment, per [place] ED Lab Result protocol.

## 2019-07-13 ENCOUNTER — APPOINTMENT (OUTPATIENT)
Dept: CT IMAGING | Facility: CLINIC | Age: 84
DRG: 683 | End: 2019-07-13
Attending: EMERGENCY MEDICINE
Payer: MEDICARE

## 2019-07-13 ENCOUNTER — APPOINTMENT (OUTPATIENT)
Dept: GENERAL RADIOLOGY | Facility: CLINIC | Age: 84
DRG: 683 | End: 2019-07-13
Attending: EMERGENCY MEDICINE
Payer: MEDICARE

## 2019-07-13 ENCOUNTER — HOSPITAL ENCOUNTER (INPATIENT)
Facility: CLINIC | Age: 84
LOS: 5 days | Discharge: HOME HEALTH | DRG: 683 | End: 2019-07-19
Attending: EMERGENCY MEDICINE | Admitting: INTERNAL MEDICINE
Payer: MEDICARE

## 2019-07-13 DIAGNOSIS — N17.9 ACUTE RENAL FAILURE, UNSPECIFIED ACUTE RENAL FAILURE TYPE (H): ICD-10-CM

## 2019-07-13 DIAGNOSIS — G89.29 OTHER CHRONIC PAIN: ICD-10-CM

## 2019-07-13 DIAGNOSIS — M54.30 SCIATICA, UNSPECIFIED LATERALITY: ICD-10-CM

## 2019-07-13 DIAGNOSIS — N17.9 AKI (ACUTE KIDNEY INJURY) (H): ICD-10-CM

## 2019-07-13 DIAGNOSIS — S32.020S CLOSED COMPRESSION FRACTURE OF SECOND LUMBAR VERTEBRA, SEQUELA: Primary | ICD-10-CM

## 2019-07-13 DIAGNOSIS — M54.50 MIDLINE LOW BACK PAIN WITHOUT SCIATICA, UNSPECIFIED CHRONICITY: ICD-10-CM

## 2019-07-13 DIAGNOSIS — R55 SYNCOPE AND COLLAPSE: ICD-10-CM

## 2019-07-13 LAB
ALBUMIN UR-MCNC: ABNORMAL MG/DL
ANION GAP SERPL CALCULATED.3IONS-SCNC: 6 MMOL/L (ref 3–14)
APPEARANCE UR: ABNORMAL
BACTERIA #/AREA URNS HPF: ABNORMAL /HPF
BASOPHILS # BLD AUTO: 0 10E9/L (ref 0–0.2)
BASOPHILS NFR BLD AUTO: 0.5 %
BILIRUB UR QL STRIP: ABNORMAL
BUN SERPL-MCNC: 28 MG/DL (ref 7–30)
CALCIUM SERPL-MCNC: 9.1 MG/DL (ref 8.5–10.1)
CHLORIDE SERPL-SCNC: 103 MMOL/L (ref 94–109)
CK SERPL-CCNC: 49 U/L (ref 30–225)
CO2 SERPL-SCNC: 26 MMOL/L (ref 20–32)
COLOR UR AUTO: ABNORMAL
CREAT SERPL-MCNC: 2.8 MG/DL (ref 0.52–1.04)
DIFFERENTIAL METHOD BLD: ABNORMAL
EOSINOPHIL # BLD AUTO: 0.2 10E9/L (ref 0–0.7)
EOSINOPHIL NFR BLD AUTO: 2.9 %
ERYTHROCYTE [DISTWIDTH] IN BLOOD BY AUTOMATED COUNT: 16.1 % (ref 10–15)
GFR SERPL CREATININE-BSD FRML MDRD: 14 ML/MIN/{1.73_M2}
GLUCOSE SERPL-MCNC: 84 MG/DL (ref 70–99)
GLUCOSE UR STRIP-MCNC: ABNORMAL MG/DL
HCT VFR BLD AUTO: 35.2 % (ref 35–47)
HGB BLD-MCNC: 11.3 G/DL (ref 11.7–15.7)
HGB UR QL STRIP: ABNORMAL
IMM GRANULOCYTES # BLD: 0.1 10E9/L (ref 0–0.4)
IMM GRANULOCYTES NFR BLD: 0.8 %
KETONES UR STRIP-MCNC: ABNORMAL MG/DL
LEUKOCYTE ESTERASE UR QL STRIP: ABNORMAL
LYMPHOCYTES # BLD AUTO: 0.8 10E9/L (ref 0.8–5.3)
LYMPHOCYTES NFR BLD AUTO: 9.4 %
MCH RBC QN AUTO: 30.5 PG (ref 26.5–33)
MCHC RBC AUTO-ENTMCNC: 32.1 G/DL (ref 31.5–36.5)
MCV RBC AUTO: 95 FL (ref 78–100)
MONOCYTES # BLD AUTO: 0.9 10E9/L (ref 0–1.3)
MONOCYTES NFR BLD AUTO: 10.8 %
MUCOUS THREADS #/AREA URNS LPF: PRESENT /LPF
NEUTROPHILS # BLD AUTO: 6.1 10E9/L (ref 1.6–8.3)
NEUTROPHILS NFR BLD AUTO: 75.6 %
NITRATE UR QL: ABNORMAL
NRBC # BLD AUTO: 0 10*3/UL
NRBC BLD AUTO-RTO: 0 /100
PH UR STRIP: ABNORMAL PH (ref 5–7)
PLATELET # BLD AUTO: 208 10E9/L (ref 150–450)
POTASSIUM SERPL-SCNC: 4.2 MMOL/L (ref 3.4–5.3)
RBC # BLD AUTO: 3.71 10E12/L (ref 3.8–5.2)
RBC #/AREA URNS AUTO: 1 /HPF (ref 0–2)
SODIUM SERPL-SCNC: 135 MMOL/L (ref 133–144)
SOURCE: ABNORMAL
SP GR UR STRIP: ABNORMAL (ref 1–1.03)
SQUAMOUS #/AREA URNS AUTO: 1 /HPF (ref 0–1)
TROPONIN I SERPL-MCNC: <0.015 UG/L (ref 0–0.04)
UROBILINOGEN UR STRIP-MCNC: ABNORMAL MG/DL (ref 0–2)
WBC # BLD AUTO: 8 10E9/L (ref 4–11)
WBC #/AREA URNS AUTO: 2 /HPF (ref 0–5)

## 2019-07-13 PROCEDURE — 51798 US URINE CAPACITY MEASURE: CPT

## 2019-07-13 PROCEDURE — 72100 X-RAY EXAM L-S SPINE 2/3 VWS: CPT

## 2019-07-13 PROCEDURE — 99220 ZZC INITIAL OBSERVATION CARE,LEVL III: CPT | Performed by: INTERNAL MEDICINE

## 2019-07-13 PROCEDURE — 81001 URINALYSIS AUTO W/SCOPE: CPT | Performed by: EMERGENCY MEDICINE

## 2019-07-13 PROCEDURE — 82550 ASSAY OF CK (CPK): CPT | Performed by: EMERGENCY MEDICINE

## 2019-07-13 PROCEDURE — 25800030 ZZH RX IP 258 OP 636: Performed by: INTERNAL MEDICINE

## 2019-07-13 PROCEDURE — 84484 ASSAY OF TROPONIN QUANT: CPT | Performed by: EMERGENCY MEDICINE

## 2019-07-13 PROCEDURE — 72125 CT NECK SPINE W/O DYE: CPT

## 2019-07-13 PROCEDURE — 93005 ELECTROCARDIOGRAM TRACING: CPT

## 2019-07-13 PROCEDURE — 25000128 H RX IP 250 OP 636: Performed by: EMERGENCY MEDICINE

## 2019-07-13 PROCEDURE — 80048 BASIC METABOLIC PNL TOTAL CA: CPT | Performed by: EMERGENCY MEDICINE

## 2019-07-13 PROCEDURE — 87086 URINE CULTURE/COLONY COUNT: CPT | Performed by: EMERGENCY MEDICINE

## 2019-07-13 PROCEDURE — 85025 COMPLETE CBC W/AUTO DIFF WBC: CPT | Performed by: EMERGENCY MEDICINE

## 2019-07-13 PROCEDURE — G0378 HOSPITAL OBSERVATION PER HR: HCPCS

## 2019-07-13 PROCEDURE — 96360 HYDRATION IV INFUSION INIT: CPT

## 2019-07-13 PROCEDURE — 99285 EMERGENCY DEPT VISIT HI MDM: CPT | Mod: 25

## 2019-07-13 PROCEDURE — 70450 CT HEAD/BRAIN W/O DYE: CPT

## 2019-07-13 PROCEDURE — 25000132 ZZH RX MED GY IP 250 OP 250 PS 637: Mod: GY | Performed by: INTERNAL MEDICINE

## 2019-07-13 PROCEDURE — 25000128 H RX IP 250 OP 636: Performed by: PHYSICIAN ASSISTANT

## 2019-07-13 PROCEDURE — 96361 HYDRATE IV INFUSION ADD-ON: CPT

## 2019-07-13 RX ORDER — ACETAMINOPHEN 325 MG/1
650 TABLET ORAL EVERY 4 HOURS PRN
Status: DISCONTINUED | OUTPATIENT
Start: 2019-07-13 | End: 2019-07-19 | Stop reason: HOSPADM

## 2019-07-13 RX ORDER — SERTRALINE HYDROCHLORIDE 100 MG/1
100 TABLET, FILM COATED ORAL DAILY
Status: DISCONTINUED | OUTPATIENT
Start: 2019-07-14 | End: 2019-07-19 | Stop reason: HOSPADM

## 2019-07-13 RX ORDER — AMOXICILLIN 250 MG
2 CAPSULE ORAL 2 TIMES DAILY PRN
Status: DISCONTINUED | OUTPATIENT
Start: 2019-07-13 | End: 2019-07-19 | Stop reason: HOSPADM

## 2019-07-13 RX ORDER — GABAPENTIN 800 MG/1
1600 TABLET ORAL AT BEDTIME
Status: CANCELLED | OUTPATIENT
Start: 2019-07-13

## 2019-07-13 RX ORDER — ISOSORBIDE DINITRATE 10 MG/1
10 TABLET ORAL 3 TIMES DAILY
Status: DISCONTINUED | OUTPATIENT
Start: 2019-07-13 | End: 2019-07-15

## 2019-07-13 RX ORDER — NALOXONE HYDROCHLORIDE 0.4 MG/ML
.1-.4 INJECTION, SOLUTION INTRAMUSCULAR; INTRAVENOUS; SUBCUTANEOUS
Status: DISCONTINUED | OUTPATIENT
Start: 2019-07-13 | End: 2019-07-19 | Stop reason: HOSPADM

## 2019-07-13 RX ORDER — AMOXICILLIN 250 MG
1 CAPSULE ORAL 2 TIMES DAILY PRN
Status: DISCONTINUED | OUTPATIENT
Start: 2019-07-13 | End: 2019-07-19 | Stop reason: HOSPADM

## 2019-07-13 RX ORDER — ONDANSETRON 2 MG/ML
4 INJECTION INTRAMUSCULAR; INTRAVENOUS EVERY 6 HOURS PRN
Status: DISCONTINUED | OUTPATIENT
Start: 2019-07-13 | End: 2019-07-19 | Stop reason: HOSPADM

## 2019-07-13 RX ORDER — SODIUM CHLORIDE 9 MG/ML
INJECTION, SOLUTION INTRAVENOUS ONCE
Status: DISCONTINUED | OUTPATIENT
Start: 2019-07-13 | End: 2019-07-13

## 2019-07-13 RX ORDER — SODIUM CHLORIDE 9 MG/ML
INJECTION, SOLUTION INTRAVENOUS CONTINUOUS
Status: DISCONTINUED | OUTPATIENT
Start: 2019-07-13 | End: 2019-07-16

## 2019-07-13 RX ORDER — ONDANSETRON 4 MG/1
4 TABLET, ORALLY DISINTEGRATING ORAL EVERY 6 HOURS PRN
Status: DISCONTINUED | OUTPATIENT
Start: 2019-07-13 | End: 2019-07-19 | Stop reason: HOSPADM

## 2019-07-13 RX ORDER — METOPROLOL SUCCINATE 100 MG/1
100 TABLET, EXTENDED RELEASE ORAL DAILY
Status: DISCONTINUED | OUTPATIENT
Start: 2019-07-14 | End: 2019-07-16

## 2019-07-13 RX ORDER — CLONIDINE HYDROCHLORIDE 0.1 MG/1
0.1 TABLET ORAL 2 TIMES DAILY
Status: DISCONTINUED | OUTPATIENT
Start: 2019-07-13 | End: 2019-07-19 | Stop reason: HOSPADM

## 2019-07-13 RX ORDER — AMLODIPINE BESYLATE 10 MG/1
10 TABLET ORAL DAILY
Status: DISCONTINUED | OUTPATIENT
Start: 2019-07-14 | End: 2019-07-19 | Stop reason: HOSPADM

## 2019-07-13 RX ORDER — GABAPENTIN 800 MG/1
800 TABLET ORAL 2 TIMES DAILY
Status: CANCELLED | OUTPATIENT
Start: 2019-07-14

## 2019-07-13 RX ORDER — POLYETHYLENE GLYCOL 3350 17 G/17G
17 POWDER, FOR SOLUTION ORAL DAILY PRN
Status: DISCONTINUED | OUTPATIENT
Start: 2019-07-13 | End: 2019-07-19 | Stop reason: HOSPADM

## 2019-07-13 RX ADMIN — SODIUM CHLORIDE 500 ML: 9 INJECTION, SOLUTION INTRAVENOUS at 18:38

## 2019-07-13 RX ADMIN — SODIUM CHLORIDE 500 ML: 9 INJECTION, SOLUTION INTRAVENOUS at 14:37

## 2019-07-13 RX ADMIN — SODIUM CHLORIDE: 9 INJECTION, SOLUTION INTRAVENOUS at 17:08

## 2019-07-13 RX ADMIN — CLONIDINE HYDROCHLORIDE 0.1 MG: 0.1 TABLET ORAL at 20:04

## 2019-07-13 RX ADMIN — ISOSORBIDE DINITRATE 10 MG: 10 TABLET ORAL at 20:04

## 2019-07-13 ASSESSMENT — ENCOUNTER SYMPTOMS
SHORTNESS OF BREATH: 0
DYSURIA: 0
WEAKNESS: 0
BACK PAIN: 1
NUMBNESS: 0
FEVER: 0
NECK PAIN: 1
NAUSEA: 0
ABDOMINAL PAIN: 0
HEMATURIA: 0
VOMITING: 0
FREQUENCY: 0
DIZZINESS: 1
HEADACHES: 1

## 2019-07-13 ASSESSMENT — MIFFLIN-ST. JEOR: SCORE: 919.41

## 2019-07-13 NOTE — LETTER
Transition Communication Hand-off for Care Transitions to Next Level of Care Provider    Name: Daya Ignacio  : 10/11/1930  MRN #: [id number]  Primary Care Provider: Marcy Soares  Primary Care MD Name: Marcy Soares PA-C  Primary Clinic: [address] [facility] [address]  Primary Care Clinic Name: [facility]  Reason for Hospitalization:  Syncope and collapse [R55]  Acute renal failure, unspecified acute renal failure type (H) [N17.9]  Midline low back pain without sciatica, unspecified chronicity [M54.5]  Admit Date/Time: 2019 12:53 PM  Discharge Date: 19  Payor Source: Payor: MEDICARE / Plan: MEDICARE / Product Type: Medicare /     Readmission Assessment Measure (EMI) Risk Score/category: High           Reason for Communication Hand-off Referral: Fragility    Discharge Plan:  Home with home care     Concern for non-adherence with plan of care:   No  Discharge Needs Assessment:  Needs      Most Recent Value   Interventions provided  community resources including [employer] infor   # of Referrals Placed by [facility]  -- [Discussed with ELLY Kenyon - Home Health Referral if d/c to home]          Already enrolled in Tele-monitoring program and name of program:  NA  Follow-up specialty is recommended: No    Follow-up plan:    Future Appointments   Date Time Provider Department Center   2019  2:15 PM Marcy Soares PA-C [facility]       Any outstanding tests or procedures:        Referrals     Future Labs/Procedures    Home care nursing referral     Comments:    RN skilled nursing visit. Resume prior home care orders    Your provider has ordered home care nursing services. If you have not been contacted within 2 days of your discharge please call the inpatient department phone number at [phone] .    Home Care PT Referral for Hospital Discharge     Comments:    PT to eval and treat    Your provider has ordered home care - physical therapy. If you have not been contacted within 2  days of your discharge please call the department phone number listed on the top of this document.              Key Recommendations:  CTS identifies patient as high risk for Elevated EMI. Patient a readmit, She has x4 previous admission and 2 ER visits in the last 3 months. This is her 3rd admission for UTI.  Patient admitted 7/4-7/7 for weakness and UTI d/t klebsiella and ESBL E coli, 6/22-6/23 admit for acute on chronic back pain. Patient discharged home with [facility] and family support, [facility] was not well established d/t patient being readmitted to the hospital. Patient admitted 6/10-6/16 for c-diff colitis and 5/26-5/31 for ESBL UTI. She has a medical h/o HTN, HLD, fall with prev vertebral fx, chronic hip pain treated with neurontin, anemia recent GI bleed and anxiety.     Patient living alone in [facility] assisted living facility.She uses a cane and RW to ambulate. She is independent in medication administration, cooking and ADL's. Patient pays a  1x/mo. Her son is supportive, provides transportation and checks in on her often. Patient also has support from her neighbors.      Spoke with son, Gael. Son feels patient hasn't been to her baseline since around Memorial day when she was initially diagnosed with UTI. [facility] wasn't able to see her much. [facility] discussed and give resources for [employer], Care giver Assurance and Senior Linkage line. Encouraged getting patient set up with [employer]    Recommendations are for patient to follow up in clinic for a BMP lab check in 1 week and to avoid NSAID's. Patient will need medication follow up as lisinopril is on hold and Neurontin dosing was decreased until renal function improves.   Patient discharged home with home care.     Christina Watts & Isabel Rowland    AVS/Discharge Summary is the source of truth; this is a helpful guide for improved communication of patient story

## 2019-07-13 NOTE — PHARMACY-ADMISSION MEDICATION HISTORY
Admission medication history interview status for this patient is complete. See [facility] admission navigator for allergy information, prior to admission medications and immunization status.     Medication history interview source(s):Patient and Family  Medication history resources (including written lists, pill bottles, clinic record):m  [facility]    Changes made to PTA medication list:  Added: -  Deleted: -  Changed: -    Actions taken by pharmacist (provider contacted, etc):None     Additional medication history information:None    Medication reconciliation/reorder completed by provider prior to medication history? No    Do you take OTC medications (eg tylenol, ibuprofen, fish oil, eye/ear drops, etc)? YES(Y/N)    For patients on insulin therapy: No (Y/N)  Lantus/levemir/NPH/Mix 70/30 dose:   (Y/N) (see Med list for doses)   Sliding scale Novolog Y/N  If Yes, do you have a baseline novolog pre-meal dose:  units with meals  Patients eat three meals a day:   Y/N    How many episodes of hypoglycemia do you have per week: _______  How many missed doses do you have per week: ______  How many times do you check your blood glucose per day: _______  Do you have a Continuous glucose monitor (CGM)   Y/N (remind pt that not approved for hospital use)   Any Barriers to therapy - Be specific :  cost of medications, comfortable with giving injections (if applicable), comfortable and confident with current diabetes regimen: Y/N ______________      Prior to Admission medications    Medication Sig Last Dose Taking? Auth Provider   amLODIPine (NORVASC) 10 MG tablet Take 1 tablet (10 mg) by mouth daily 7/13/2019 at Unknown time Yes Marcy Soares PA-C   Calcium Carbonate (CALCIUM 600 PO) Take 1 tablet by mouth 2 times daily  7/13/2019 at x1 Yes Reported, Patient   Cholecalciferol (VITAMIN D3) 2000 UNITS CAPS Take 2,000 Units by mouth daily (with dinner) 7/12/2019 at Unknown time Yes Unknown, Entered By History    cloNIDine (CATAPRES) 0.1 MG tablet TAKE ONE TABLET BY MOUTH TWO TIMES A DAY 7/13/2019 at x1 Yes Marcy Soares PA-C   estradiol (ESTRACE) 0.1 MG/GM vaginal cream Place 2 g vaginally three times a week paraben-free 7/12/2019 at Unknown time Yes Marcy Soares PA-C   fish oil-omega-3 fatty acids 1000 MG capsule Take 1 g by mouth daily 7/13/2019 at Unknown time Yes Unknown, Entered By History   gabapentin (NEURONTIN) 800 MG tablet Take 800 mg by mouth 2 times daily In AM and afternoon 7/13/2019 at x1 Yes Reported, Patient   gabapentin (NEURONTIN) 800 MG tablet Take 1,600 mg by mouth At Bedtime 7/12/2019 at Unknown time Yes Reported, Patient   ibuprofen (ADVIL/MOTRIN) 200 MG tablet Take 400 mg by mouth every 4 hours as needed for mild pain  Yes Unknown, Entered By History   isosorbide dinitrate (ISORDIL) 10 MG tablet Take 1 tablet (10 mg) by mouth 3 times daily 7/13/2019 at x1 Yes Marcy Soares PA-C   lisinopril (PRINIVIL/ZESTRIL) 40 MG tablet Take 1 tablet (40 mg) by mouth daily 7/13/2019 at Unknown time Yes Marcy Soares PA-C   metoprolol succinate ER (TOPROL-XL) 100 MG 24 hr tablet Take 1 tablet (100 mg) by mouth daily 7/13/2019 at Unknown time Yes Marcy Soares PA-C   Multiple Vitamins-Minerals (PRESERVISION AREDS) CAPS Take 1 capsule by mouth daily  7/13/2019 at Unknown time Yes Unknown, Entered By History   oxyCODONE (ROXICODONE) 5 MG tablet Take 1 tablet (5 mg) by mouth 3 times daily as needed for pain  Yes Colin Rodas MD   senna (SENOKOT) 8.6 MG tablet Take 1 tablet by mouth 2 times daily as needed for constipation  Yes Colin Rodas MD   sertraline (ZOLOFT) 100 MG tablet Take 1 tablet (100 mg) by mouth daily 7/13/2019 at Unknown time Yes Marcy Soares PA-C   sulfamethoxazole-trimethoprim (BACTRIM DS) 800-160 MG tablet Take 1 tablet by mouth 2 times daily for 10 days 7/13/2019 at x1 Yes Colin Rodas MD   vitamin C (ASCORBIC ACID) 500 MG tablet Take 500 mg  by mouth daily 7/13/2019 at Unknown time Yes Reported, Patient   phenazopyridine (PYRIDIUM) 200 MG tablet Take 1 tablet (200 mg) by mouth 3 times daily as needed for irritation   Colin Rodas MD

## 2019-07-13 NOTE — ED NOTES
Bed: ED10  Expected date: 7/13/19  Expected time: 12:34 PM  Means of arrival: Ambulance  Comments:  calvin [phone]- [phone], GI issues, seen yesterday

## 2019-07-13 NOTE — ED TRIAGE NOTES
Pt with a fall last night and woke up on the floor this morning, states neighbor came over and found her. Does not remember fall but states it was after 9pm. C/o lower back pain, chronic but worse today. ABC's intact, alert and oriented X3. BS check per EMS: 96. From [place], independent living.

## 2019-07-13 NOTE — ED NOTES
[facility]  ED Nurse Handoff Report    Daya Ignacio is a 88 year old female   ED Chief complaint: Fall  . ED Diagnosis:   Final diagnoses:   Syncope and collapse   Acute renal failure, unspecified acute renal failure type (H)     Allergies:   Allergies   Allergen Reactions     Atorvastatin Muscle Pain (Myalgia)     Macrobid [Nitrofurantoin Anhydrous] Other (See Comments)     Body aches     Sulfa Drugs      Tolerated Bactrim May 2019       Code Status: Full Code  Activity level - Baseline/Home:  Independent. Activity Level - Current:   Assist X 2. Lift room needed: No. Bariatric: No   Needed: No   Isolation: No. Infection: Not Applicable.     Vital Signs:   Vitals:    07/13/19 1300 07/13/19 1400 07/13/19 1445 07/13/19 1500   BP: 178/71  164/65 165/63   Pulse:   71 70   Resp:       Temp:       TempSrc:       SpO2: 92% 95% 91% 96%       Cardiac Rhythm:  ,      Pain level: 0-10 Pain Scale: 6  Patient confused: No. Patient Falls Risk: Yes.   Elimination Status: Has voided   Patient Report - Initial Complaint: fall last night. Focused Assessment: Pt found down on floor this morning by neighbor, pt does not remember falling but states she remembers stating she was going to bed at 9am. Pt c/o lower back pain worse than normal today. From independent living, currently on Day 3 of Bactrim for UTI. Alert and oriented X3.    Tests Performed: CT, Xray, labs, UA. Abnormal Results: Elevated Creatinine.   Treatments provided: IV NS Bolus and Infusion.  Family Comments: N/A  OBS brochure/video discussed/provided to patient:  Yes  ED Medications:   Medications   0.9% sodium chloride BOLUS (500 mLs Intravenous New Bag 7/13/19 5614)   sodium chloride 0.9% infusion (has no administration in time range)     Drips infusing:  Yes  For the majority of the shift, the patient's behavior Green. Interventions performed were N/A.     Severe Sepsis OR Septic Shock Diagnosis Present: No      ED Nurse Name/Phone Number:  Renee Montana,   3:04 PM    RECEIVING UNIT ED HANDOFF REVIEW    Above ED Nurse Handoff Report was reviewed: Yes  Reviewed by: Eros Ruiz on July 13, 2019 at 3:44 PM

## 2019-07-13 NOTE — PLAN OF CARE
PRIMARY DIAGNOSIS: GENERALIZED WEAKNESS    OUTPATIENT/OBSERVATION GOALS TO BE MET BEFORE DISCHARGE  1. Orthostatic performed: Yes:          Lying Orthostatic BP: 163/62         Sitting Orthostatic BP: 142/94         Standing Orthostatic BP: 123/46     2. Tolerating PO medications: Yes    3. Return to near baseline physical activity: No - Ind at baseline, assist of 1, some dizziness with position changes.  Alisson YANES, notified of orthostatic changes, bolus ordered    4. Cleared for discharge by consultants (if involved): N/A    Per tele tech, SR HR 70s    Discharge Planner Nurse   Safe discharge environment identified: Yes  Barriers to discharge: No       Entered by: Eros Ruiz 07/13/2019 6:31 PM     Please review provider order for any additional goals.   Nurse to notify provider when observation goals have been met and patient is ready for discharge.

## 2019-07-13 NOTE — ED PROVIDER NOTES
History     Chief Complaint:  Fall     HPI   Daya Ignacio is a 88 year old female with a history of hypertension and hyperlipidemia who presents via EMS for evaluation following a fall. Yesterday evening the last thing the patient remembers is her friend telling her that she was going to bed at around 2100, and the next thing the patient remembers is waking up on her floor sometime last night after a presumed unwitnessed fall. The patient believes that she never made it to bed and lost consciousness in the fall, and after this fall she was able to sit up but was unable to get off the floor without assistance. This morning, one of the patient's friends stopped by her apartment, found her on the floor, and called EMS to bring her into the ED for evaluation. Since the fall, the patient reports that she has developed a frontal headache and minor neck pain, felt somewhat dizzy, and she has had pain in her lower back that is worse than her regular chronic back pain. EMS did check her blood sugar and found it to be 96 and she was placed in a C collar prior to arrival in the ED. Notably, the patient was recently seen here in the ED on 7/11, at which time she was diagnosed with a UTI and started on a course of Bactrim. Otherwise, the patient denies any fever, chest pain, shortness of breath, abdominal pain, nausea, vomiting, dysuria, hematuria, urinary frequency, numbness, or weakness. She reports that she has been eating and drinking well recently.     Allergies:  Atorvastatin   Macrobid  Sulfa drugs      Medications:    Amlodipine  Clonidine   Gabapentin  Ibuprofen  Isordil  Lisinopril  Metoprolol succinate   Oxycodone   Pyridium  Senokot  Zoloft  Bactrim DS     Past Medical History:    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   Clostridium difficile colitis  Hypertension  Lumbar compression fracture  Upper GI bleed  Macular degeneration (senile) of retina  Hyperlipidemia  Osteoporosis   Chronic pain  NSTEMI     Past  Surgical History:    EGD, combined  Orthopedic surgery     Family History:    Alzheimer disease - Mother  Heart attack - Father  Parkinson's disease - Brother     Social History:  Tobacco use:    Never smoker   Alcohol use:    Negative   Marital status:    Single   Accompanied to ED by:  EMS      Review of Systems   Constitutional: Negative for fever.   Respiratory: Negative for shortness of breath.    Cardiovascular: Negative for chest pain.   Gastrointestinal: Negative for abdominal pain, nausea and vomiting.   Genitourinary: Negative for dysuria, frequency and hematuria.   Musculoskeletal: Positive for back pain and neck pain.   Neurological: Positive for dizziness, syncope and headaches. Negative for weakness and numbness.   All other systems reviewed and are negative.    Physical Exam     Patient Vitals for the past 24 hrs:   BP Temp Temp src Pulse Heart Rate Resp SpO2   07/13/19 1500 165/63 -- -- 70 -- -- 96 %   07/13/19 1445 164/65 -- -- 71 -- -- 91 %   07/13/19 1400 -- -- -- -- -- -- 95 %   07/13/19 1300 178/71 -- -- -- -- -- 92 %   07/13/19 1255 178/71 98.2  F (36.8  C) Oral 66 66 18 99 %        Physical Exam    Nursing note and vitals reviewed.    Constitutional: Pleasant and well groomed.          HENT:    Mouth/Throat: Oropharynx is without swelling or erythema. Oral mucosa dry.    Eyes: Conjunctivae are normal. No scleral icterus.    Neck:  C collar in place. Diffuse posterior midline cervical tenderness.   Cardiovascular: Normal rate, regular rhythm and intact distal pulses.    Pulmonary/Chest: Effort normal and breath sounds normal.   Abdominal: Soft.  No distension. There is no tenderness.   Musculoskeletal:  No edema, No calf tenderness  Neurological:Alert and oriented x3. PERRL. EOROM intact. Upper and lower extremity strength intact throughout. Coordination normal. Light touch sensation intact throughout.   Skin: Skin is warm and dry.   Psychiatric: Normal mood and affect.     Emergency Department  Course   ECG (13:05:51):  Indication: Screening for cardiovascular disease.   Rate 65 bpm. WY interval 164 ms. QRS duration 84 ms. QT/QTc 428/445 ms. P-R-T axes 71 32 5.   Interpretation: Normal sinus rhythm, Left axis deviation, Moderate voltage criteria for LVH may be normal variant, Abnormal ECG   Agree with computer interpretation. Yes   No significant change compared to EKG dated 5/26/2019.   Interpreted at 1322 by Dr. Shipman.      Imaging:  Radiographic findings were communicated with the patient who voiced understanding of the findings.    Lumbar spine XR:  IMPRESSION:   1. Apparent slightly worsened height loss of the L2 vertebral body with questionable increased sclerosis. New mild compression fracture cannot be excluded.  2. No change of multiple old compression fractures and severe degenerative change.  Per radiology.      Head CT w/o Contrast:  IMPRESSION: Chronic white matter changes intracranially as above. Overall no change from 5/26/2019 and no acute process.   Per radiology.     Cervical Spine CT w/o Contrast:  IMPRESSION: No acute fracture in the cervical spine. Since prior study, stable alignment and degenerative changes.  Per radiology.     Laboratory:  CBC: HGB 11.3 low, o/w WNL (WBC 8.0, )   BMP: Creatinine 2.80 high, GFR estimate 14 low, o/w WNL   Troponin I 1309: <0.015   CK total: Pending   UA with Microscopic: WBC 2, RBC 1, Few bacteria, Mucous present, o/w unable to assess due to interfering substance   Urine Culture Aerobic Bacterial: Pending     Interventions:  1437  mL IV     Emergency Department Course:  Patient was brought to the ED via EMS.     Nursing notes and vitals reviewed.  1330: I performed an exam of the patient as documented above.     1448: I updated and reassessed the patient. Her C spine was cleared clinically and her C collar was removed.     1505: I spoke with Dr. Pereira of the hospitalist service regarding patient's presentation, findings, and plan of  care.     Findings and plan explained to the Patient who consents to admission. Discussed the patient with Dr. Pereira, who will admit the patient to an obs bed for further monitoring, evaluation, and treatment.     Impression & Plan      Medical Decision Making:  Daya Ignacio is a 88 year old female who presents after a syncopal episode last night and inability to get up since the fall. She had some neck pain and some mild headache and some low back pain which did not seem to be significantly different than baseline. ED evaluation is as noted above. CT of head and C spine revealed no acute findings. Lumbar spine X-ray shows possible worsening of a previous compression fracture. Labs revealed a significant worsening of her creatinine. She does not have urinary retention. She did appear clinically dry although there was no clear explanation for dehydration. We initiated gentle fluid rehydration intravenously.  Lumbar film reveals possible worsening of compression fracture although that does not seem to be significantly affecting her at this time. I have spoken with Dr. Pereira who has accepted ongoing care of the patient in observation for continued evaluation, monitoring, and management.     Diagnosis:    ICD-10-CM    1. Syncope and collapse R55 Urine Culture Aerobic Bacterial     CK total     CK total     CANCELED: CK total   2. Acute renal failure, unspecified acute renal failure type (H) N17.9    3. Midline low back pain without sciatica, unspecified chronicity M54.5     possible new compression fracture       Disposition:  Admitted to Dr. Pereira.       I, August Minaya, am serving as a scribe at 1:30 PM on 7/13/2019 to document services personally performed by Dr. Shipman, based on my observations and the provider's statements to me.    [facility] EMERGENCY DEPARTMENT       Delmy Shipman MD  07/13/19 [id number]       Delmy Shipman MD  07/13/19 1618

## 2019-07-13 NOTE — H&P
[facility]  Hospitalist Admission Note  Name: Daya Ignacio    MRN: [id number]  YOB: 1930    Age: 88 year old  Date of admission: 7/13/2019  Primary care provider: Marcy Soares    Chief Complaint:  weakness    Assessment and Plan:   AYAKA: Creatinine 2.8 when it was previously at baseline 0.52 days ago.  Suspect combination of mild dehydration, new Bactrim prescription, increased ibuprofen use over the past 2 days (400-600mg two to three times per day) and lisinopril.  CK not elevated.  -Received IV fluid bolus, continue normal saline at 100 ml/hr overnight  -Check BMP tomorrow  -Hold lisinopril, NSAIDs.  Stopped Bactrim    Possible syncope: Reports getting ready for bed and then waking up on the floor and was too weak to get up.  No prodromal symptoms.  Per son this is happened multiple times in the past when she said infections.  Reports no chest pain or palpitations.  EKG shows NSR without ST elevation or depression.  Troponin is undetectable so not consistent with acute MI as it has been 12 hours since the episode.  Recent echocardiogram without any significant changes that would lead to syncope.  Is not hypotensive here.  May have been related to her acute kidney injury, mild dehydration as she also vomited this morning, and oxycodone she has been taking for the last 2 days.  Son reports she does get confused when taking any opiates and he noted when he talked her yesterday evening she sounded off.  No bowel or bladder incontinence, tongue biting, or confusion to suggest seizure.  Doubt any new infection without any leukocytosis, fever, and urinalysis looks totally normal with no growth on urine culture 2 days ago.  -Monitor on telemetry overnight  -Check orthostatics in the morning and ambulate patient.  If no further symptoms, no further cardiac work-up  -Currently no focal deficits so doubt acute CVA or TIA, CT head negative for acute pathology, if develops new focal  deficits consider brain MRI    Possible mild L2 compression fx, age undetermined, chronic back pain: Has had some increase in her chronic low back pain more recently.  Has had compression fractures in the past.  X-ray showing decreased height of the L2 vertebral body so cannot rule out a mild compression fracture although age indeterminate.  Has been using ibuprofen 400-600 mg twice daily or 3 times daily and got oxycodone 2 days ago for this.  She is on rather high doses of gabapentin at baseline with 800 mg twice daily and 1600 mg at bedtime.  -Stop NSAIDs and recommended she use acetaminophen instead  -Recommend no oxycodone given the syncopal episode and she is confused when taking  -Based on mild degree even if acute unlikely to , had a brace in the past which she did not tolerate  -Hold gabapentin due to significantly elevated creatinine, resume tomorrow if improved    Recent ESBL UTIs: Recent ESBL E. coli UTI infections.  Was even hospitalized here for 10 days and received meropenem.  Most recent culture from 7/4 was positive for the E. coli ESBL and also Klebsiella variicola.  Had some suprapubic pressure and urinalysis on 7/11 in the ED showed 20 WBCs so she was given Bactrim prescription based on previous sensitivities.  Urine culture did not grow anything and her current urinalysis today only has 2 WBCs, so highly doubt any acute infection at this time.  The orange discoloration is due to the Pyridium.  No leukocytosis no fever.  No urinary symptoms.  -Recommend stopping antibiotics at this time    Recent Cdif: Recent C. difficile infection treated with oral vancomycin which she just finished a course of.  Diarrhea completely resolved.  No abdominal pain.    Physical deconditioning: Patient overall deconditioned from her multiple recent hospitalizations due to UTIs and C. difficile.  Does have physical therapy scheduled in the outpatient setting, however is not mated and there due to  frequent returns to the emergency room.  No focal deficits.  -Ambulate patient tomorrow and if doing okay will continue with plan for outpatient physical therapy    HTN: PTA on multiple medications including amlodipine 10 mg daily, clonidine 0.1 mg twice daily, lisinopril 40 g daily, metoprolol succinate 100 mg daily and Isordil 10 mg 3 times daily.  Reports not taking her blood pressure medicines this morning.  Blood pressure initially in the 170 systolic now down to 150.  -Resume amlodipine, clonidine, metoprolol, Isordil  -Hold lisinopril for acute kidney injury.  If blood pressure well controlled without it would hold this medicine for now    Anemia recent GI bleed: Hemoglobin is 11.3 similar to the past few days.  Was significantly lower in the setting of a lower GI bleed recently.  This is likely diverticular or related to her C. difficile infection.  Has been taking ibuprofen.    Anxiety: resume pta sertraline 100mg daily.        DVT Prophylaxis: Low Risk/Ambulatory with no VTE prophylaxis indicated  Code Status: Full Code  FEN: regular diet, NS at 100 ml/hr  Discharge Dispo: home to senior living with outpt PT likely  Estimated Disch Date / # of Days until Disch: Admit to observation for acute kidney injury, IV fluids overnight and repeat labs.  Likely discharge tomorrow if improving      History of Present Illness:  Daya Ignacio is a 88 year old female with PMH including HTN, anemia, lumbar compression fractures, chronic back pain, multiple hospitalizations for recent ESBL UTIs, recent C. difficile infection, and recent likely diverticular GI bleed who presents with generalized weakness.  Patient has been hospitalized here multiple times recently for ESBL E. coli UTIs and then developing C. difficile infection.  She has also been back to the emergency department with back pain and more recently 2 days ago suprapubic pain.  She received oxycodone along with a Bactrim prescription due to some mild pyuria in  case this represented return of UTI.  Yesterday evening she was getting ready for bed and then she remembers waking up on the floor.  She was not very confused, but she does not remember how she fell.  She did not feel lightheaded prior to falling.  No palpitations or chest pain.  She felt too weak to get up and slept on the ground overnight for approximately 12 hours.  She did not have any bowel or bladder incontinence.  Did not bite her tongue.  She denies any new areas of pain.  Her low back pain has been worse more recently and she is been taking increased doses of ibuprofen 400 to 600mg 2-3 times per day.  She did take some of the oxycodone pills and her son reports she seemed off on the phone yesterday evening which happens when she takes any opiates.  She denies any suprapubic pain now, dysuria, or urinary frequency.  She did have an episode of nausea with some vomiting this morning.  Her diarrhea has resolved and she recently finished her oral vancomycin course.  She has not had any flank pain, cough, fevers, or chills.  Her son notes that she has had episodes similar to this where she has lost consciousness when she had infection or dehydration.  As of yet she is been unable to make it to her outpatient physical therapy due to frequent returns to the ED here in hospitalizations.  Overall is felt generally weak since May.  They are happy to hear that the most recent urine culture from 2 days ago was negative.    History obtained from patient, patient's son, medical record, and from Dr. Shipman in the emergency department.  Initially hypertensive to the 170s then 150s in the ED.  Afebrile.  Not tachycardic.  Oxygen saturation normal on room air.  She is alert and oriented and nothing focal on neurologic exam.  Her labs are notable for hemoglobin of 11.3 which is her baseline.  No leukocytosis.  Does have a fairly significant acute kidney injury with creatinine 2.8 up from 0.52 days ago.  Troponin is  undetectable and her CK is not elevated.  EKG shows normal sinus rhythm without any ST elevation or depression.  Her urine is orange in color second of the Pyridium but only has 2 WBC's and is not suggestive of infection.  I recommend no further antibiotics.  CT head and C-spine negative for acute pathology.  Lumbar x-ray showing mild height loss in L2 which may be a mild compression fracture, age-indeterminate.  Request for observation bed for acute kidney injury.  Received 500 mL normal saline bolus.  Will provide IV fluids and repeat labs in the morning.     Past Medical History reviewed:  Past Medical History:   Diagnosis Date     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 4/16/2014     Closed fracture of unspecified part of neck of femur      Closed fracture of unspecified part of vertebral column without mention of spinal cord injury     from fall summer 06     Clostridium difficile colitis 06/2019     Hypertension      Lumbar compression fracture (H)      Macular degeneration (senile) of retina, unspecified      Upper GI bleed 06/2019     Urinary tract infection due to extended-spectrum beta lactamase (ESBL)-producing Klebsiella 05/2019    resistant to Bactrim     Past Surgical History reviewed:  Past Surgical History:   Procedure Laterality Date     C NONSPECIFIC PROCEDURE      lt hip pain     ESOPHAGOSCOPY, GASTROSCOPY, DUODENOSCOPY (EGD), COMBINED N/A 6/11/2019    Procedure: ESOPHAGOGASTRODUODENOSCOPY (EGD);  Surgeon: Gaurav Degroot MD;  Location:  GI     ORTHOPEDIC SURGERY       Social History reviewed:  Social History     Tobacco Use     Smoking status: Never Smoker     Smokeless tobacco: Never Used   Substance Use Topics     Alcohol use: No     Social History     Social History Narrative     Not on file     Family History reviewed:  Family History   Problem Relation Age of Onset     Alzheimer Disease Mother         mild     Cardiovascular Father         heart attack     Neurologic Disorder  Brother 83        Parkinson's     Allergies:  Allergies   Allergen Reactions     Atorvastatin Muscle Pain (Myalgia)     Macrobid [Nitrofurantoin Anhydrous] Other (See Comments)     Body aches     Sulfa Drugs      Tolerated Bactrim May 2019     Medications:  Prior to Admission medications    Medication Sig Last Dose Taking? Auth Provider   amLODIPine (NORVASC) 10 MG tablet Take 1 tablet (10 mg) by mouth daily   Marcy Soares PA-C   Calcium Carbonate (CALCIUM 600 PO) Take 1 tablet by mouth 2 times daily    Reported, Patient   Cholecalciferol (VITAMIN D3) 2000 UNITS CAPS Take 2,000 Units by mouth daily (with dinner)   Unknown, Entered By History   cloNIDine (CATAPRES) 0.1 MG tablet TAKE ONE TABLET BY MOUTH TWO TIMES A DAY   Marcy Soares PA-C   estradiol (ESTRACE) 0.1 MG/GM vaginal cream Place 2 g vaginally three times a week paraben-free   Marcy Soares PA-C   fish oil-omega-3 fatty acids 1000 MG capsule Take 1 g by mouth daily   Unknown, Entered By History   gabapentin (NEURONTIN) 800 MG tablet Take 800 mg by mouth 2 times daily In AM and afternoon   Reported, Patient   gabapentin (NEURONTIN) 800 MG tablet Take 1,600 mg by mouth At Bedtime   Reported, Patient   ibuprofen (ADVIL/MOTRIN) 200 MG tablet Take 400 mg by mouth every 4 hours as needed for mild pain   Unknown, Entered By History   isosorbide dinitrate (ISORDIL) 10 MG tablet Take 1 tablet (10 mg) by mouth 3 times daily   Marcy Soares PA-C   lisinopril (PRINIVIL/ZESTRIL) 40 MG tablet Take 1 tablet (40 mg) by mouth daily   Marcy Soares PA-C   metoprolol succinate ER (TOPROL-XL) 100 MG 24 hr tablet Take 1 tablet (100 mg) by mouth daily   Marcy Soares PA-C   Multiple Vitamins-Minerals (PRESERVISION AREDS) CAPS Take 1 capsule by mouth daily    Unknown, Entered By History   oxyCODONE (ROXICODONE) 5 MG tablet Take 1 tablet (5 mg) by mouth 3 times daily as needed for pain   Colin Rodas MD   phenazopyridine (PYRIDIUM) 200 MG  tablet Take 1 tablet (200 mg) by mouth 3 times daily as needed for irritation   Colin Rodas MD   senna (SENOKOT) 8.6 MG tablet Take 1 tablet by mouth 2 times daily as needed for constipation   Colin Rodas MD   sertraline (ZOLOFT) 100 MG tablet Take 1 tablet (100 mg) by mouth daily   Marcy Soares, PA-C   sulfamethoxazole-trimethoprim (BACTRIM DS) 800-160 MG tablet Take 1 tablet by mouth 2 times daily for 10 days   Colin Rodas MD   vitamin C (ASCORBIC ACID) 500 MG tablet Take 500 mg by mouth daily   Reported, Patient     Review of Systems:  A Comprehensive greater than 10 system review of systems was carried out.  Pertinent positives and negatives are noted above.  Otherwise negative.     Physical Exam:  Blood pressure 165/63, pulse 70, temperature 98.2  F (36.8  C), temperature source Oral, resp. rate 18, SpO2 96 %, not currently breastfeeding.  Wt Readings from Last 1 Encounters:   07/11/19 52.6 kg (116 lb)     Exam:  Constitutional: Awake, NAD  Eyes: sclera white   HEENT: atraumatic, MMM  Respiratory: no respiratory distress, lungs cta bilaterally, no crackles or wheeze  Cardiovascular: RRR.  No murmur  GI: non-tender, not distended, bowel sounds present  Skin: no rash or lesions, acyanotic  Musculoskeletal/extremities: atraumatic, no major deformities. No edema  Neurologic: A&O, speech clear, strength and light touch sensation grossly normal  Psychiatric: calm, cooperative, normal affect    Lab and imaging data personally reviewed:  Labs:  Recent Labs   Lab 07/13/19  1309 07/11/19  0640 07/08/19  0633   WBC 8.0 6.5 4.5   HGB 11.3* 12.3 11.2*   HCT 35.2 37.1 34.5*   MCV 95 94 94    243 189     Recent Labs   Lab 07/13/19  1309 07/11/19  0640 07/08/19  0633    137 136   POTASSIUM 4.2 3.3* 3.7   CHLORIDE 103 101 103   CO2 26 32 29   ANIONGAP 6 4 4   GLC 84 97 91   BUN 28 18 13   CR 2.80* 0.51* 0.46*   GFRESTIMATED 14* 86 88   GFRESTBLACK 17* >90 >90   DAVE 9.1 9.2 9.0      Recent Labs   Lab 07/13/19  1309   CKT 49     Recent Labs   Lab 07/13/19  1309   TROPI <0.015     Recent Labs   Lab 07/13/19  1405   COLOR Orange   APPEARANCE Cloudy   URINEGLC Unable to assay due to interfering substance*   URINEBILI Unable to assay due to interfering substance*   URINEKETONE Unable to assay due to interfering substance*   SG Unable to assay due to interfering substance   UBLD Unable to assay due to interfering substance*   URINEPH Unable to assay due to interfering substance   PROTEIN Unable to assay due to interfering substance*   NITRITE Unable to assay due to interfering substance*   LEUKEST Unable to assay due to interfering substance*   RBCU 1   WBCU 2       EKG: NSR, T wave inversion in lead III, no ST elevation or depression, LVH    Imaging:  Recent Results (from the past 24 hour(s))   Cervical spine CT w/o contrast    Narrative    CT OF THE CERVICAL SPINE WITHOUT CONTRAST July 13, 2019 2:25 PM     HISTORY: Fall, neck pain.     TECHNIQUE: Axial images of the cervical spine were acquired without  intravenous contrast. Multiplanar reformations were created. Radiation  dose for this scan was reduced using automated exposure control,  adjustment of the mA and/or kV according to patient size, or iterative  reconstruction technique.     COMPARISON: 5/8/2017.    FINDINGS: Degenerative cervical spondylosis with satisfactory height.  3 mm anterior subluxation C3 upon C4 with 1 mm posterior subluxation  C4 upon C5 on C5 upon C6. 1 mm anterior subluxation of C7 upon T1. No  evidence for fracture of the skull base/foramen magnum or occipital  condyle level. Normal cervical prevertebral soft tissues. Rightward  cervicothoracic convexity curve. Posterior fossa structures shows no  acute abnormality. Poorly defined focal infiltrate left upper lung.  Secretions within the thoracic inlet level esophagus. No fracture,  subluxation or dislocation. Degenerative changes in the spine with  vascular  calcification are noted.      Impression    IMPRESSION: No acute fracture in the cervical spine. Since prior  study, stable alignment and degenerative changes.      ARIEL RIVERS MD   Head CT w/o contrast    Narrative    CT SCAN OF THE HEAD WITHOUT CONTRAST July 13, 2019 2:25 PM     HISTORY: Closed head injury, headache.    TECHNIQUE: Axial images of the head and coronal reformations without  IV contrast material. Radiation dose for this scan was reduced using  automated exposure control, adjustment of the mA and/or kV according  to patient size, or iterative reconstruction technique.    COMPARISON: 5/26/2019.    FINDINGS:     Intracranial contents: No midline shift, mass, mass effect or  hemorrhage. Mild chronic ischemic changes deep white matter both  cerebral hemispheres. No extra-axial blood products or fluid  collections are identified. Vascular calcification. No acute orbital  process with post procedure changes both lobes. Bone algorithm shows  no fracture of the calvarium or skull base. There is no evidence of  intracranial hemorrhage, mass, acute infarct or anomaly.    Visualized orbits/sinuses/mastoids:  The visualized portions of the  sinuses and mastoids appear normal.    Osseous structures/soft tissues: No fracture. Skull base  demineralization.      Impression    IMPRESSION: Chronic white matter changes intracranially as above.  Overall no change from 5/26/2019 and no acute process.     ARIEL RIVERS MD   Lumbar spine XR, 2-3 views    Narrative    LUMBAR SPINE TWO - THREE VIEWS   7/13/2019 2:31 PM     HISTORY: Pain, fall.    COMPARISON: X-ray 6/22/2019.    FINDINGS: Alignment is significant for slight S-shaped curvature. T11  compression fracture with moderate to severe height loss is unchanged.  L1 compression fracture with vertebra plana is unchanged. There is  increased height loss of the L2 vertebral body with suggestion of  slightly increased superior endplate sclerosis along the  posterior  aspect of the vertebral body. L3 compression fracture with mild height  loss is unchanged. Multilevel severe degenerative disc disease and  facet arthropathy are present. Scattered vascular calcifications are  present.      Impression    IMPRESSION: 1  1. Apparent slightly worsened height loss of the L2 vertebral body  with questionable increased sclerosis. New mild compression fracture  cannot be excluded.  2. No change of multiple old compression fractures and severe  degenerative change.    MD Jermaine SANCHEZ MD  Hospitalist  [facility]

## 2019-07-13 NOTE — PROGRESS NOTES
ROOM # [id number]    Living Situation (if not independent, order SW consult): [facility] IND  Facility name:  : [facility]    Activity level at baseline: Ind with cane or walker  Activity level on admit: Up with 1 and cane and RW      Patient registered to observation; given Patient Bill of Rights; given the opportunity to ask questions about observation status and their plan of care.  Patient has been oriented to the observation room, bathroom and call light is in place.    Discussed discharge goals and expectations with patient/family.

## 2019-07-14 ENCOUNTER — APPOINTMENT (OUTPATIENT)
Dept: ULTRASOUND IMAGING | Facility: CLINIC | Age: 84
DRG: 683 | End: 2019-07-14
Attending: INTERNAL MEDICINE
Payer: MEDICARE

## 2019-07-14 LAB
ANION GAP SERPL CALCULATED.3IONS-SCNC: 6 MMOL/L (ref 3–14)
BACTERIA SPEC CULT: NO GROWTH
BUN SERPL-MCNC: 34 MG/DL (ref 7–30)
CALCIUM SERPL-MCNC: 7.9 MG/DL (ref 8.5–10.1)
CHLORIDE SERPL-SCNC: 110 MMOL/L (ref 94–109)
CO2 SERPL-SCNC: 22 MMOL/L (ref 20–32)
CREAT SERPL-MCNC: 3.63 MG/DL (ref 0.52–1.04)
CREAT SERPL-MCNC: 3.84 MG/DL (ref 0.52–1.04)
CREAT UR-MCNC: 49 MG/DL
FRACT EXCRET NA UR+SERPL-RTO: 5.1 %
GFR SERPL CREATININE-BSD FRML MDRD: 10 ML/MIN/{1.73_M2}
GFR SERPL CREATININE-BSD FRML MDRD: 11 ML/MIN/{1.73_M2}
GLUCOSE SERPL-MCNC: 84 MG/DL (ref 70–99)
Lab: NORMAL
POTASSIUM SERPL-SCNC: 4.3 MMOL/L (ref 3.4–5.3)
SODIUM SERPL-SCNC: 137 MMOL/L (ref 133–144)
SODIUM SERPL-SCNC: 138 MMOL/L (ref 133–144)
SODIUM UR-SCNC: 89 MMOL/L
SPECIMEN SOURCE: NORMAL

## 2019-07-14 PROCEDURE — 96361 HYDRATE IV INFUSION ADD-ON: CPT

## 2019-07-14 PROCEDURE — 82570 ASSAY OF URINE CREATININE: CPT | Performed by: INTERNAL MEDICINE

## 2019-07-14 PROCEDURE — 99232 SBSQ HOSP IP/OBS MODERATE 35: CPT | Performed by: INTERNAL MEDICINE

## 2019-07-14 PROCEDURE — 25000132 ZZH RX MED GY IP 250 OP 250 PS 637: Mod: GY | Performed by: INTERNAL MEDICINE

## 2019-07-14 PROCEDURE — 36415 COLL VENOUS BLD VENIPUNCTURE: CPT | Performed by: INTERNAL MEDICINE

## 2019-07-14 PROCEDURE — G0378 HOSPITAL OBSERVATION PER HR: HCPCS

## 2019-07-14 PROCEDURE — 82565 ASSAY OF CREATININE: CPT | Performed by: INTERNAL MEDICINE

## 2019-07-14 PROCEDURE — 99207 ZZC CDG-MDM COMPONENT: MEETS LOW - DOWN CODED: CPT | Performed by: INTERNAL MEDICINE

## 2019-07-14 PROCEDURE — 80048 BASIC METABOLIC PNL TOTAL CA: CPT | Performed by: INTERNAL MEDICINE

## 2019-07-14 PROCEDURE — 84295 ASSAY OF SERUM SODIUM: CPT | Performed by: INTERNAL MEDICINE

## 2019-07-14 PROCEDURE — 84300 ASSAY OF URINE SODIUM: CPT | Performed by: INTERNAL MEDICINE

## 2019-07-14 PROCEDURE — 12000011 ZZH R&B MS OVERFLOW

## 2019-07-14 PROCEDURE — 76770 US EXAM ABDO BACK WALL COMP: CPT

## 2019-07-14 PROCEDURE — 25800030 ZZH RX IP 258 OP 636: Performed by: INTERNAL MEDICINE

## 2019-07-14 RX ORDER — GABAPENTIN 800 MG/1
800 TABLET ORAL 2 TIMES DAILY
Status: DISCONTINUED | OUTPATIENT
Start: 2019-07-14 | End: 2019-07-14

## 2019-07-14 RX ORDER — GABAPENTIN 800 MG/1
1600 TABLET ORAL AT BEDTIME
Status: DISCONTINUED | OUTPATIENT
Start: 2019-07-14 | End: 2019-07-14

## 2019-07-14 RX ADMIN — AMLODIPINE BESYLATE 10 MG: 10 TABLET ORAL at 09:33

## 2019-07-14 RX ADMIN — SODIUM CHLORIDE: 9 INJECTION, SOLUTION INTRAVENOUS at 04:21

## 2019-07-14 RX ADMIN — ISOSORBIDE DINITRATE 10 MG: 10 TABLET ORAL at 14:26

## 2019-07-14 RX ADMIN — ACETAMINOPHEN 650 MG: 325 TABLET, FILM COATED ORAL at 15:40

## 2019-07-14 RX ADMIN — CLONIDINE HYDROCHLORIDE 0.1 MG: 0.1 TABLET ORAL at 09:32

## 2019-07-14 RX ADMIN — ISOSORBIDE DINITRATE 10 MG: 10 TABLET ORAL at 19:55

## 2019-07-14 RX ADMIN — SERTRALINE HYDROCHLORIDE 100 MG: 100 TABLET ORAL at 09:33

## 2019-07-14 RX ADMIN — SODIUM CHLORIDE: 9 INJECTION, SOLUTION INTRAVENOUS at 17:31

## 2019-07-14 RX ADMIN — Medication 2.5 MG: at 12:50

## 2019-07-14 RX ADMIN — ISOSORBIDE DINITRATE 10 MG: 10 TABLET ORAL at 09:32

## 2019-07-14 RX ADMIN — METOPROLOL SUCCINATE 100 MG: 100 TABLET, EXTENDED RELEASE ORAL at 09:33

## 2019-07-14 RX ADMIN — ACETAMINOPHEN 650 MG: 325 TABLET, FILM COATED ORAL at 09:45

## 2019-07-14 NOTE — PLAN OF CARE
PRIMARY DIAGNOSIS: AYAKA  OUTPATIENT/OBSERVATION GOALS TO BE MET BEFORE DISCHARGE:  ADLs back to baseline: No, baseline Ind, here Ax1 with walker and gait belt    Activity and level of assistance: Ax1 with walker and gait belt     Pain status: Improved-controlled with oral pain medications, chronic back pain 8/10 pain.    Return to near baseline physical activity: No     Discharge Planner Nurse   Safe discharge environment identified: Yes  Barriers to discharge: Yes       Entered by: Ange Wood 07/14/2019 2:04 PM     Please review provider order for any additional goals.   Nurse to notify provider when observation goals have been met and patient is ready for discharge.    VSS, on RA, afebrile. Up Ax1 with walker and gait belt. A&O x4. Tolerating regular diet, good appetite. Ambulated in hallway this morning, tolerated well. Pt was unable to void this morning, provider ordered vallecillo to be placed. Vallecillo placed and output is dark red/ orange. Orthostatics were negative this morning. Per tele tech SR HR 69, tele was later discontinued. New IV placed by flyer as pt IV accidentally fell out this morning. NS running at 100mL/hr. Pt has chronic back pain 8/10. PRN Tylenol given with no relief. Writer spoke with provider and Oxy was ordered and relieved pain. Pt went down for an ultrasound of her kidneys and was negative for any changes. Plan- IV fluids, continue vallecillo catheter, hold lisinopril and NSAIDS, if creatinine doesn't improve nephrology may be consulted. Pt is inpatient status.

## 2019-07-14 NOTE — PLAN OF CARE
PRIMARY DIAGNOSIS: AYAKA, Weakness     OUTPATIENT/OBSERVATION GOALS TO BE MET BEFORE DISCHARGE  1. Orthostatic performed: Yes:          Lying Orthostatic BP: 163/62         Sitting Orthostatic BP: 142/94         Standing Orthostatic BP: 123/46     2. Completion of appropriate imaging: Yes    3. Tolerating PO medications: Yes    4. Return to near baseline physical activity: Up with assist of 1 with walker, baseline independent with walker     5. Cleared for discharge by consultants (if involved): No    Patient alert and oriented x4. Vitals are Temp: 96.6  F (35.9  C) Temp src: Oral BP: 121/50 Pulse: 73 Heart Rate: 69 Resp: 20 SpO2: 94 % RA. Reports 6/10 back pain; declined interventions. Tele is SR in 60s. Tolerating regular diet. Up with assist of 1 with walker. NS infusing at 100 ml/hr. Plan to continue with supportive cares.      Discharge Planner Nurse   Safe discharge environment identified: Yes  Barriers to discharge: No       Entered by: Stacia Kumar 07/14/2019      Please review provider order for any additional goals.   Nurse to notify provider when observation goals have been met and patient is ready for discharge.

## 2019-07-14 NOTE — PLAN OF CARE
PRIMARY DIAGNOSIS: AYAKA, Weakness     OUTPATIENT/OBSERVATION GOALS TO BE MET BEFORE DISCHARGE  1. Orthostatic performed: Yes:          Lying Orthostatic BP: 163/62         Sitting Orthostatic BP: 142/94         Standing Orthostatic BP: 123/46     2. Completion of appropriate imaging: Yes    3. Tolerating PO medications: Yes    4. Return to near baseline physical activity: Up with assist of 1 with walker, baseline independent with walker     5. Cleared for discharge by consultants (if involved): No    Patient alert and oriented x4. Vitals are Temp: 96.8  F (36  C) Temp src: Oral BP: 121/50 Pulse: 73 Heart Rate: 63 Resp: 12 SpO2: 92 % RA. Reports 6/10 back pain; declined interventions. Tele is SR in 60s. Tolerating regular diet. Up with assist of 1 with walker. NS infusing at 100 ml/hr. Plan to continue with supportive cares.      Discharge Planner Nurse   Safe discharge environment identified: Yes  Barriers to discharge: No       Entered by: Stacia Kumar 07/14/2019      Please review provider order for any additional goals.   Nurse to notify provider when observation goals have been met and patient is ready for discharge.

## 2019-07-14 NOTE — PROGRESS NOTES
[facility]  Hospitalist Progress Note  Amanda Kenyon MD 07/14/19  Text Page  Pager: [phone] (7am-6pm)    Reason for Stay (Diagnosis): AYAKA         Assessment and Plan:      Summary of Stay: Daya Ignacio is a 88 year old female with past medical history of hypertension, chronic anemia, lumbar compression fractures with chronic back pain, multiple hospitalizations for recent ESBL UTIs as well as C. difficile and recent likely diverticular GI bleed and recent ER visit with back pain/suprapubic pain (started on Bactrim for possible UTI) who was admitted on 7/13/2019 with generalized weakness and was found to have acute kidney injury as well as urinary retention.    Problem List/Assessment and Plan:     AYAKA:  Baseline creatinine approximately 0.5.  Creatinine was 2.8 upon admission and has increased to 3.63 this morning.  This is likely due to medications and potentially dehydration as well as urinary retention.  She had been prescribed Bactrim for possible UTI, increased her ibuprofen use over the past several days and is on lisinopril.  This morning bladder scan showed greater than 600 cc in the bladder and Almanza catheter was placed.  She has hematuria but no clots.    -Continue Almanza catheter for urinary retention  -Check FENa and renal ultrasound  -Hold lisinopril, NSAIDs.  Stopped Bactrim  -Normal saline at 100 cc/h  -If creatinine is not improving tomorrow likely nephrology consult     Possible syncope: Reports getting ready for bed and then waking up on the floor and was too weak to get up.  No prodromal symptoms.  Per son this is happened multiple times in the past when she has had infections.  Reports no chest pain or palpitations.  EKG shows NSR without ST elevation or depression.  Troponin is undetectable.  Recent echocardiogram without any significant changes that would lead to syncope.  Is not hypotensive here.  May have been related to her acute kidney injury, mild dehydration as she  also vomited this morning, and oxycodone she has been taking for the last 2 days.  Son reports she does get confused when taking any opiates.    She was able to ambulate today with slight lightheadedness initially but this resolved.  -No further work-up needed     Possible mild L2 compression fx, age undetermined, chronic back pain: Has had some increase in her chronic low back pain more recently.  Has had compression fractures in the past.  X-ray showing decreased height of the L2 vertebral body so cannot rule out a mild compression fracture although age indeterminate.  Has been using ibuprofen 400-600 mg twice daily or 3 times daily and got oxycodone 2 days ago for this.  She is on rather high doses of gabapentin at baseline with 800 mg twice daily and 1600 mg at bedtime.  -Stop NSAIDs and recommended she use acetaminophen instead  -Add low dose Oxycodone (2.5 mg) for pain  -Based on mild degree even if acute unlikely to , had a brace in the past which she did not tolerate  -Hold gabapentin due to significantly elevated creatinine, this could be restarted once renal function improves likely will need to be a lower dose until normalized renal function     Recent ESBL UTIs: Recent ESBL E. coli UTI infections.  Was even hospitalized here for 10 days and received meropenem.  Most recent culture from 7/4 was positive for the E. coli ESBL and also Klebsiella variicola.  Had some suprapubic pressure and urinalysis on 7/11 in the ED showed 20 WBCs so she was given Bactrim prescription based on previous sensitivities.  Urine culture did not grow anything and her current urinalysis on admission only has 2 WBCs, so highly doubt any acute infection at this time.  The orange discoloration is due to the Pyridium.  No leukocytosis no fever.  No urinary symptoms.  Urine culture so far is negative.  -No antibiotics indicated at this time     Recent Cdif: Recent C. difficile infection treated with oral vancomycin  which she just finished a course of.  Diarrhea completely resolved.  No abdominal pain.     Physical deconditioning: Patient overall deconditioned from her multiple recent hospitalizations due to UTIs and C. difficile.  Does have physical therapy scheduled in the outpatient setting.  She was able to walk with nursing staffing today.  At this time she does not need physical therapy in the hospital.     HTN: PTA on multiple medications including amlodipine 10 mg daily, clonidine 0.1 mg twice daily, lisinopril 40 g daily, metoprolol succinate 100 mg daily and Isordil 10 mg 3 times daily.    -Resume amlodipine, clonidine, metoprolol, Isordil  -Hold lisinopril for acute kidney injury     Anemia recent GI bleed: Hemoglobin is 11.3 similar to the past few days.  Was significantly lower in the setting of a lower GI bleed recently.  This is likely diverticular or related to her C. difficile infection.  Has been taking ibuprofen.     Anxiety: resume pta sertraline 100mg daily.    Diet: Regular Diet Adult    DVT Prophylaxis: Low Risk/Ambulatory with no VTE prophylaxis indicated  Almanza Catheter: not present  Code Status: Full Code      Disposition Plan   Expected discharge: 2 - 3 days, recommended to prior living arrangement once renal function improved.  Entered: Amanda Kenyon MD 07/14/2019, 10:33 AM     Change to inpatient status given worsening kidney function and need for further work-up/treatment.    The patient's care was discussed with the Bedside Nurse, Care Coordinator/, Patient and Patient's Family.    Amanda Kenyon MD  Hospitalist Service  [facility]          Interval History (Subjective):      Patient was seen with the bedside nurse this morning.  She had a bladder scan which showed 600 cc of urine and she was not able to urinate.  Almanza catheter was placed.  She did have blood-tinged urine without clots after placement of Almanza.  The patient is feeling well.  She denies nausea,  "vomiting, abdominal pain.  She does not have any specific urinary symptoms also not being able to urinate this morning.  She went for a walk this morning and felt slightly lightheaded initially but this resolved after a few minutes.  Denies chest pain or shortness of breath.  She has never had urinary retention before.    I spoke with the patient's son, Gael, by phone.  I reviewed the care plan and all of his questions were answered.                  Physical Exam:      Last Vital Signs:  /47 (BP Location: Left arm)   Pulse 73   Temp 96.8  F (36  C) (Oral)   Resp 20   Ht 1.549 m (5' 1\")   Wt 55.2 kg (121 lb 11.2 oz)   SpO2 97%   BMI 23.00 kg/m      General: Alert, awake, no acute distress.  HEENT: Normocephalic and atraumatic, eyes anicteric and without scleral injection, EOMI, face symmetric, MMM.  Cardiac: RRR, normal S1, S2. No m/g/r, no LE edema.  Pulmonary: Normal chest rise, normal work of breathing.  Lungs CTAB without crackles or wheezing.  Abdomen: soft, non-tender, non-distended.  Normoactive bowel sounds, no guarding or rebound tenderness.  : Almanza catheter in place draining red-tinged urine without clots.  Extremities: no deformities.  Warm, well perfused.  Skin: no rashes or lesions.  Warm and Dry.  Neuro: No focal deficits.  Speech clear.  Spontaneous he moving all extremities in bed.  Psych: Alert and oriented x3. Appropriate affect.         Medications:      All current medications were reviewed with changes reflected in problem list.         Data:      All new lab and imaging data was reviewed.   Labs:  Recent Labs   Lab 07/14/19  0601 07/13/19  1309 07/11/19  0640    135 137   POTASSIUM 4.3 4.2 3.3*   CHLORIDE 110* 103 101   CO2 22 26 32   ANIONGAP 6 6 4   GLC 84 84 97   BUN 34* 28 18   CR 3.63* 2.80* 0.51*   GFRESTIMATED 11* 14* 86   GFRESTBLACK 12* 17* >90   DAVE 7.9* 9.1 9.2     Recent Labs   Lab 07/13/19  1309 07/11/19  0640 07/08/19  0633   WBC 8.0 6.5 4.5   HGB 11.3* 12.3 " 11.2*   HCT 35.2 37.1 34.5*   MCV 95 94 94    243 189      Imaging:   Recent Results (from the past 24 hour(s))   Cervical spine CT w/o contrast    Narrative    CT OF THE CERVICAL SPINE WITHOUT CONTRAST July 13, 2019 2:25 PM     HISTORY: Fall, neck pain.     TECHNIQUE: Axial images of the cervical spine were acquired without  intravenous contrast. Multiplanar reformations were created. Radiation  dose for this scan was reduced using automated exposure control,  adjustment of the mA and/or kV according to patient size, or iterative  reconstruction technique.     COMPARISON: 5/8/2017.    FINDINGS: Degenerative cervical spondylosis with satisfactory height.  3 mm anterior subluxation C3 upon C4 with 1 mm posterior subluxation  C4 upon C5 on C5 upon C6. 1 mm anterior subluxation of C7 upon T1. No  evidence for fracture of the skull base/foramen magnum or occipital  condyle level. Normal cervical prevertebral soft tissues. Rightward  cervicothoracic convexity curve. Posterior fossa structures shows no  acute abnormality. Poorly defined focal infiltrate left upper lung.  Secretions within the thoracic inlet level esophagus. No fracture,  subluxation or dislocation. Degenerative changes in the spine with  vascular calcification are noted.      Impression    IMPRESSION: No acute fracture in the cervical spine. Since prior  study, stable alignment and degenerative changes.      ARIEL RIVERS MD   Head CT w/o contrast    Narrative    CT SCAN OF THE HEAD WITHOUT CONTRAST July 13, 2019 2:25 PM     HISTORY: Closed head injury, headache.    TECHNIQUE: Axial images of the head and coronal reformations without  IV contrast material. Radiation dose for this scan was reduced using  automated exposure control, adjustment of the mA and/or kV according  to patient size, or iterative reconstruction technique.    COMPARISON: 5/26/2019.    FINDINGS:     Intracranial contents: No midline shift, mass, mass effect or  hemorrhage.  Mild chronic ischemic changes deep white matter both  cerebral hemispheres. No extra-axial blood products or fluid  collections are identified. Vascular calcification. No acute orbital  process with post procedure changes both lobes. Bone algorithm shows  no fracture of the calvarium or skull base. There is no evidence of  intracranial hemorrhage, mass, acute infarct or anomaly.    Visualized orbits/sinuses/mastoids:  The visualized portions of the  sinuses and mastoids appear normal.    Osseous structures/soft tissues: No fracture. Skull base  demineralization.      Impression    IMPRESSION: Chronic white matter changes intracranially as above.  Overall no change from 5/26/2019 and no acute process.     ARIEL RIVERS MD   Lumbar spine XR, 2-3 views    Narrative    LUMBAR SPINE TWO - THREE VIEWS   7/13/2019 2:31 PM     HISTORY: Pain, fall.    COMPARISON: X-ray 6/22/2019.    FINDINGS: Alignment is significant for slight S-shaped curvature. T11  compression fracture with moderate to severe height loss is unchanged.  L1 compression fracture with vertebra plana is unchanged. There is  increased height loss of the L2 vertebral body with suggestion of  slightly increased superior endplate sclerosis along the posterior  aspect of the vertebral body. L3 compression fracture with mild height  loss is unchanged. Multilevel severe degenerative disc disease and  facet arthropathy are present. Scattered vascular calcifications are  present.      Impression    IMPRESSION: 1  1. Apparent slightly worsened height loss of the L2 vertebral body  with questionable increased sclerosis. New mild compression fracture  cannot be excluded.  2. No change of multiple old compression fractures and severe  degenerative change.    MD Amanda SANCHEZ MD.

## 2019-07-14 NOTE — CONSULTS
Care Transition Services    CTS identifies patient as high risk for Elevated EMI. Patient a readmit, She has x4 previous admission and 2 ER visits in the last 3 months. This is her 3rd admission for UTI.  Patient admitted 7/4-7/7 for weakness and UTI d/t klebsiella and ESBL E coli, 6/22-6/23 admit for acute on chronic back pain. Patient discharged home with [facility] and family support, HHC was not well established d/t patient being readmitted to the hospital. Patient admitted 6/10-6/16 for c-diff colitis and 5/26-5/31 for ESBL UTI. She has a medical h/o HTN, HLD, fall with prev vertebral fx, chronic hip pain treated with neurontin, anemia recent GI bleed and anxiety.    Patient living alone in [facility] assisted living facility.She uses a cane and RW to ambulate. She is independent in medication administration, cooking and ADL's. Patient pays a  1x/mo. Her son is supportive, provides transportation and checks in on her often. Patient also has support from her neighbors.     Spoke with son, Gael. Son feels patient hasn't been to her baseline since around memorial day when she was initially diagnosed with UTI. [facility] wasn't able to see her much. CTS discussed and give resources for [place] [employer], Care giver Assurance and Senior Linkage line. Encouraged getting patient set up with [facility].     An appointment was scheduled with patient PCP, MARGARITA Gomez.     A handoff will be sent to the clinic when discharged.     Discharge plan: TBD, if discharging to home, patient will benefit from referral to  [facility].      CTS will continue to follow for discharge planning needs.     Christina Watts RN BSN CTS  Care Management Team  [facility]

## 2019-07-14 NOTE — PLAN OF CARE
PRIMARY DIAGNOSIS: AYAKA, Weakness     OUTPATIENT/OBSERVATION GOALS TO BE MET BEFORE DISCHARGE  1. Orthostatic performed: Yes:          Lying Orthostatic BP: 163/62         Sitting Orthostatic BP: 142/94         Standing Orthostatic BP: 123/46     2. Completion of appropriate imaging: Yes    3. Tolerating PO medications: Yes    4. Return to near baseline physical activity: Up with assist of 1 with walker, baseline independent with walker     5. Cleared for discharge by consultants (if involved): No    Patient alert and oriented x4. Vitals are Temp: 97.8  F (36.6  C) Temp src: Oral BP: 134/47 Pulse: 73 Heart Rate: 74 Resp: 16 SpO2: 91 % RA. Reports 6/10 back pain; declined interventions. Tele is SR in 60s. Tolerating regular diet. Up with assist of 1 with walker. NS infusing at 100 ml/hr. Plan to continue with supportive cares.      Discharge Planner Nurse   Safe discharge environment identified: Yes  Barriers to discharge: No       Entered by: Stacia Kumar 07/13/2019      Please review provider order for any additional goals.   Nurse to notify provider when observation goals have been met and patient is ready for discharge.

## 2019-07-15 LAB
ALBUMIN UR-MCNC: NEGATIVE MG/DL
ANION GAP SERPL CALCULATED.3IONS-SCNC: 7 MMOL/L (ref 3–14)
APPEARANCE UR: ABNORMAL
BACTERIA #/AREA URNS HPF: ABNORMAL /HPF
BILIRUB UR QL STRIP: NEGATIVE
BUN SERPL-MCNC: 40 MG/DL (ref 7–30)
CALCIUM SERPL-MCNC: 8 MG/DL (ref 8.5–10.1)
CHLORIDE SERPL-SCNC: 110 MMOL/L (ref 94–109)
CO2 SERPL-SCNC: 20 MMOL/L (ref 20–32)
COLOR UR AUTO: YELLOW
CREAT SERPL-MCNC: 4.07 MG/DL (ref 0.52–1.04)
ERYTHROCYTE [DISTWIDTH] IN BLOOD BY AUTOMATED COUNT: 15.9 % (ref 10–15)
FERRITIN SERPL-MCNC: 131 NG/ML (ref 8–252)
FOLATE SERPL-MCNC: 17.5 NG/ML
GFR SERPL CREATININE-BSD FRML MDRD: 9 ML/MIN/{1.73_M2}
GLUCOSE SERPL-MCNC: 86 MG/DL (ref 70–99)
GLUCOSE UR STRIP-MCNC: NEGATIVE MG/DL
HCT VFR BLD AUTO: 28.2 % (ref 35–47)
HGB BLD-MCNC: 8.8 G/DL (ref 11.7–15.7)
HGB UR QL STRIP: ABNORMAL
HYALINE CASTS #/AREA URNS LPF: 6 /LPF (ref 0–2)
INTERPRETATION ECG - MUSE: NORMAL
IRON SERPL-MCNC: 39 UG/DL (ref 35–180)
KETONES UR STRIP-MCNC: NEGATIVE MG/DL
LEUKOCYTE ESTERASE UR QL STRIP: ABNORMAL
MCH RBC QN AUTO: 30.8 PG (ref 26.5–33)
MCHC RBC AUTO-ENTMCNC: 31.2 G/DL (ref 31.5–36.5)
MCV RBC AUTO: 99 FL (ref 78–100)
MUCOUS THREADS #/AREA URNS LPF: PRESENT /LPF
NITRATE UR QL: NEGATIVE
PH UR STRIP: 5 PH (ref 5–7)
PHOSPHATE SERPL-MCNC: 5.5 MG/DL (ref 2.5–4.5)
PLATELET # BLD AUTO: 126 10E9/L (ref 150–450)
POTASSIUM SERPL-SCNC: 4.4 MMOL/L (ref 3.4–5.3)
RBC # BLD AUTO: 2.86 10E12/L (ref 3.8–5.2)
RBC #/AREA URNS AUTO: 11 /HPF (ref 0–2)
SODIUM SERPL-SCNC: 137 MMOL/L (ref 133–144)
SOURCE: ABNORMAL
SP GR UR STRIP: 1.01 (ref 1–1.03)
SQUAMOUS #/AREA URNS AUTO: 3 /HPF (ref 0–1)
UROBILINOGEN UR STRIP-MCNC: NORMAL MG/DL (ref 0–2)
VIT B12 SERPL-MCNC: 377 PG/ML (ref 193–986)
WBC # BLD AUTO: 5.4 10E9/L (ref 4–11)
WBC #/AREA URNS AUTO: 130 /HPF (ref 0–5)

## 2019-07-15 PROCEDURE — 25000132 ZZH RX MED GY IP 250 OP 250 PS 637: Mod: GY | Performed by: HOSPITALIST

## 2019-07-15 PROCEDURE — 36415 COLL VENOUS BLD VENIPUNCTURE: CPT | Performed by: INTERNAL MEDICINE

## 2019-07-15 PROCEDURE — 25800030 ZZH RX IP 258 OP 636: Performed by: INTERNAL MEDICINE

## 2019-07-15 PROCEDURE — 81001 URINALYSIS AUTO W/SCOPE: CPT | Performed by: HOSPITALIST

## 2019-07-15 PROCEDURE — 25000132 ZZH RX MED GY IP 250 OP 250 PS 637: Mod: GY | Performed by: INTERNAL MEDICINE

## 2019-07-15 PROCEDURE — 25000128 H RX IP 250 OP 636: Performed by: HOSPITALIST

## 2019-07-15 PROCEDURE — 99232 SBSQ HOSP IP/OBS MODERATE 35: CPT | Performed by: HOSPITALIST

## 2019-07-15 PROCEDURE — 87086 URINE CULTURE/COLONY COUNT: CPT | Performed by: INTERNAL MEDICINE

## 2019-07-15 PROCEDURE — 12000011 ZZH R&B MS OVERFLOW

## 2019-07-15 PROCEDURE — 99207 ZZC CDG-MDM COMPONENT: MEETS LOW - DOWN CODED: CPT | Performed by: HOSPITALIST

## 2019-07-15 PROCEDURE — 82607 VITAMIN B-12: CPT | Performed by: INTERNAL MEDICINE

## 2019-07-15 PROCEDURE — 85027 COMPLETE CBC AUTOMATED: CPT | Performed by: INTERNAL MEDICINE

## 2019-07-15 PROCEDURE — 84100 ASSAY OF PHOSPHORUS: CPT | Performed by: INTERNAL MEDICINE

## 2019-07-15 PROCEDURE — 83540 ASSAY OF IRON: CPT | Performed by: INTERNAL MEDICINE

## 2019-07-15 PROCEDURE — 82746 ASSAY OF FOLIC ACID SERUM: CPT | Performed by: INTERNAL MEDICINE

## 2019-07-15 PROCEDURE — 80048 BASIC METABOLIC PNL TOTAL CA: CPT | Performed by: INTERNAL MEDICINE

## 2019-07-15 PROCEDURE — 25000131 ZZH RX MED GY IP 250 OP 636 PS 637: Mod: GY | Performed by: INTERNAL MEDICINE

## 2019-07-15 PROCEDURE — 82728 ASSAY OF FERRITIN: CPT | Performed by: INTERNAL MEDICINE

## 2019-07-15 RX ORDER — LIDOCAINE 4 G/G
1 PATCH TOPICAL
Status: DISCONTINUED | OUTPATIENT
Start: 2019-07-15 | End: 2019-07-19 | Stop reason: HOSPADM

## 2019-07-15 RX ORDER — LIDOCAINE 4 G/G
1 PATCH TOPICAL
Status: DISCONTINUED | OUTPATIENT
Start: 2019-07-16 | End: 2019-07-15

## 2019-07-15 RX ADMIN — CLONIDINE HYDROCHLORIDE 0.1 MG: 0.1 TABLET ORAL at 20:59

## 2019-07-15 RX ADMIN — ONDANSETRON 4 MG: 4 TABLET, ORALLY DISINTEGRATING ORAL at 09:05

## 2019-07-15 RX ADMIN — AMLODIPINE BESYLATE 10 MG: 10 TABLET ORAL at 08:17

## 2019-07-15 RX ADMIN — PROCHLORPERAZINE EDISYLATE 5 MG: 5 INJECTION INTRAMUSCULAR; INTRAVENOUS at 11:10

## 2019-07-15 RX ADMIN — Medication 2.5 MG: at 12:51

## 2019-07-15 RX ADMIN — SODIUM CHLORIDE: 9 INJECTION, SOLUTION INTRAVENOUS at 12:51

## 2019-07-15 RX ADMIN — CLONIDINE HYDROCHLORIDE 0.1 MG: 0.1 TABLET ORAL at 08:17

## 2019-07-15 RX ADMIN — SODIUM CHLORIDE: 9 INJECTION, SOLUTION INTRAVENOUS at 03:21

## 2019-07-15 RX ADMIN — LIDOCAINE 1 PATCH: 560 PATCH PERCUTANEOUS; TOPICAL; TRANSDERMAL at 20:54

## 2019-07-15 RX ADMIN — Medication 2.5 MG: at 21:31

## 2019-07-15 RX ADMIN — Medication 2.5 MG: at 17:16

## 2019-07-15 RX ADMIN — METOPROLOL SUCCINATE 100 MG: 100 TABLET, EXTENDED RELEASE ORAL at 08:17

## 2019-07-15 RX ADMIN — ISOSORBIDE DINITRATE 10 MG: 10 TABLET ORAL at 08:17

## 2019-07-15 RX ADMIN — SERTRALINE HYDROCHLORIDE 100 MG: 100 TABLET ORAL at 08:16

## 2019-07-15 NOTE — PLAN OF CARE
Neuro: A&Ox4  Cardiac: Denies chest pain  Lungs: WNL  GI: WNL  :vallecillo. Decreased urinary output. Bladder scan 51 vallecillo output for shift 150.   Skin: WNL  Pain: denies pain   Meds: whole with water   Labs/tests: CR 3.84  Diet: tolerating regular  Activity: SBA with walker   Misc:   Plan: Nephrology consult in AM if CR not improving. Eventually back to [facility] with [facility]

## 2019-07-15 NOTE — PROGRESS NOTES
Infection Prevention:    Patient requires Contact precautions because of ESBL: urine, 2019. Please contact Infection Prevention with any questions/concerns at *[phone].    Annette Fatima, ICP

## 2019-07-15 NOTE — PLAN OF CARE
[place]: A&Ox4  VS: WNL  LS: clear, room air, denies SOB  Cardiac: WNL  GI: WNL - last BM this AM  : has vallecillo for retention - orange urine  Skin: WNL  Activity: SBA to A-1 with walker  Diet: renal diet  Pain: oxy given for chronic back pain  Plan: pain control, strict I&O, nephrology consult, [facility].

## 2019-07-15 NOTE — PLAN OF CARE
Patient alert and oriented x4. Vitals are Temp: 96.2  F (35.7  C) Temp src: Oral BP: 149/54   Heart Rate: 57 Resp: 16 SpO2: 95 % RA. Denies pain. Lung sound clear and equal bilaterally. Cardiac WNL. Baseline tingling in bilateral hands and fingers persists. No edema noted. GI WNL. Continuing with vallecillo for retention. Urine is red/orange in color. Tolerating regular diet. Up with standby assist with walker. Contact precautions maintained. NS infusing at 100 ml/hr. Possible consult with nephrology today. Plan to assist with supportive cares, possible consult with nephrology pending labs.

## 2019-07-15 NOTE — PROGRESS NOTES
[facility]  Patient is currently open to home care services with [facility].  The patient is currently receiving RN/PT services.  [facility]  and team have been notified of patient admission.  [facility] liaison will continue to follow patient during stay.  If appropriate provide orders to resume home care at time of discharge.

## 2019-07-15 NOTE — CONSULTS
Consult Date:  07/15/2019      RENAL CONSULTATION      REQUESTING PHYSICIAN:  Dr. Mckeon      CONSULTANT:  Dr. Barragan      REASON FOR CONSULTATION:  AYAKA.      HISTORY OF PRESENT ILLNESS:  This is an 88-year-old female admitted on 07/13/2019 with a fall and nausea and vomiting.  She was noted to have acute kidney injury.  She had a history of taking nonsteroidal anti-inflammatory drugs on a chronic basis, but more so recently due to severe back pain due to a compression fracture.  She is taking 400-600 mg 2-3 times a day.  She tells me she has been taking ibuprofen on almost a daily basis for quite a while.  She was also on an ACE inhibitor and recently started on trimethoprim sulfa for a urinary tract infection.  These were all stopped, and she was treated with normal saline IV.      The patient denies prior history of kidney disease or urologic surgery.  She has a history of recurrent urinary tract infections, but no history of kidney stones.  The family history is negative for chronic kidney disease.  She has longstanding hypertension.  She does not have diabetes, heart disease or lung disease.  She has never had a stroke.      Today, she feels generally well, other than some nausea.  She denies shortness of breath.  She denies chest or abdominal pain.  She does complain of back pain.  She has a Almanza catheter in place.      PAST MEDICAL HISTORY:  Includes compression fracture, recurrent UTIs, C. difficile infection, and hypertension.      PAST SURGICAL HISTORY:  Includes bilateral hip surgeries, one for a total hip arthroplasty, the other for an ORIF.      CURRENT MEDICATIONS:  Include:   1.  Norvasc.   2.  Clonidine.   3.  Isordil.   4.  Metoprolol.   5.  Sertraline.   6.  Normal saline.      SOCIAL HISTORY:  She lives in an assisted living senior facility.  She does not smoke or drink.  She is originally from [place].      FAMILY HISTORY:  Negative for chronic kidney disease or end-stage renal disease.   The father apparently had ASCVD.  Her brother had Parkinson's disease, and her mother had dementia.      REVIEW OF SYSTEMS:  Negative, except as noted above.      PHYSICAL EXAMINATION:   GENERAL:  She is alert and oriented.  She is sitting comfortably in a chair.   VITAL SIGNS:  Blood pressure 143/62, heart rate 65, respiratory rate 18.  She is afebrile.  Intake and output yesterday was 3700/450.  There are no weights recorded.     HEENT:  Head shows no trauma.  The eyes show pupils round and reactive to light.  Mouth shows good dentition.  Posterior pharynx is clear.   NECK:  Supple.   LUNGS:  Clear breath sounds.  There are no wheezes or rales.   CARDIAC:  No jugular venous distention.  Regular rhythm, normal S1, S2.  No murmur, rub or gallop.   ABDOMEN:  Soft and nontender.   GENITOURINARY:  Almanza catheter in place.   EXTREMITIES:  Normal nails, joints and pulses throughout.  No edema.   NEUROLOGIC:  Symmetric cranial nerves.  Moves all 4 extremities well.  Normal mental status.      LABORATORY DATA:  On 07/11/2019, the creatinine was 0.51.  Electrolytes showed a potassium of 3.3 and a bicarbonate of 32.  On admission, the creatinine was up to 2.8 and has risen steadily.  Today, it is 4.07.  Her electrolytes today show potassium 4.4 and a bicarbonate of 20.  Hemoglobin is 8.8.  Urinalysis today was negative for protein, trace blood, 130 white cells, 11 red cells, large leukocyte esterase.  There is a Almanza catheter in place.  Yesterday, the urine sodium was 89 with a fractional excretion of sodium of 5.1.  An ultrasound of the kidneys was unremarkable.      IMPRESSION:   1.  Acute kidney injury:  There may be a prerenal component initially, but now she has been hydrated and she has been in a positive fluid balance.  I doubt she is still prerenal, but we will continue IV fluids at a modest rate to ensure adequate hydration ongoing.  All the offending medications, including Bactrim, ibuprofen, lisinopril, have been  stopped.  I do not see any exposure to IV contrast.  She has a Almanza catheter in place, and the ultrasound shows no obstruction.  Our plan will be to continue moderate IV fluids.  We will follow her laboratories closely.  She most likely has acute tubular necrosis at this point.   2.  Anemia:  We will check iron, B-12 and folate levels.  Her hemoglobin is 8.8.  She may need nutritional replenishment.   3.  Urinary tract infections:  We will see what today's culture shows.  Original cultures were unremarkable.  She is not on antibiotics.   4.  Hypertension:  I will adjust her medications.  We will continue clonidine, metoprolol and amlodipine.  There is no reason for her to be on isosorbide, as far as I can tell.  She has no heart history and this is not a very effective antihypertensive.  As the next step, we would likely add a diuretic, but most likely wait until after her renal function has returned to normal.         ARY NAVARRETE MD             D: 07/15/2019   T: 07/15/2019   MT: RANDI      Name:     MAGALY MCDANIELS   MRN:      [id number]        Account:       [id number]   :      10/11/1930           Consult Date:  07/15/2019      Document: [id number]

## 2019-07-15 NOTE — PROGRESS NOTES
Pt ambulated in the hallway SBA with gait belt and walker.Steady gait.C/o of low back pain.Denies dizziness.

## 2019-07-15 NOTE — PROGRESS NOTES
[facility]  Hospitalist Progress Note  Juliano Mckeon MD 07/14/19  Text Page  Pager: [phone]  Reason for Stay (Diagnosis): AYAKA         Assessment and Plan:      Summary of Stay: Daya Ignacio is a 88 year old female with past medical history of hypertension, chronic anemia, lumbar compression fractures with chronic back pain, multiple hospitalizations for recent ESBL UTIs as well as C. difficile and recent likely diverticular GI bleed and recent ER visit with back pain/suprapubic pain (started on Bactrim for possible UTI) who was admitted on 7/13/2019 with generalized weakness and was found to have acute kidney injury as well as urinary retention.    Acute renal failure    - likely ATN: due to lisinopril, bactrim and NSAIDs    - seen by nephrology    - cont IVF    - UA done (repeated today), renal US normal    - holding lisinopril, NSAIDs and stopped Bactrim    - holding gabapentin    Possible syncope    - prior to admission    - likely due to renal failure    - no further work-up    Possible mild L2 compression fx, age undetermined, chronic back pain    - recent increase in low back pain    - X-ray showing decreased height of the L2 vertebral body so cannot rule out a mild compression fracture although age indeterminate.    - on low dose oxy    - add lidocaine patches    Recent ESBL UTIs    - cleared    - no further treatment    - recent cultures negative x 2     Recent Cdif    -  Recent C. difficile infection treated with oral vancomycin which she just finished    - Diarrhea completely resolved.  No abdominal pain.     Physical deconditioning    - ambulate today with staff    HTN    - cont amlodipine 10 mg daily, clonidine 0.1 mg twice daily, metoprolol succinate 100 mg daily and Isordil 10 mg 3 times daily.      -Hold lisinopril for acute kidney injury     Anemia recent GI bleed    - likely partially dilutional    - re-check in am     Anxiety     - resume pta sertraline 100mg daily.    Had  "urinary retention and vallecillo was placed    - discontinue vallecillo    I left a detail message updating her son    Diet: Renal Diet (non-dialysis)    DVT Prophylaxis: Low Risk/Ambulatory with no VTE prophylaxis indicated  Vallecillo Catheter: in place, indication: Retention  Code Status: Full Code      Disposition Plan   Expected discharge: 2 - 3 days, recommended to prior living arrangement once renal function improved.  Entered: Juliano Mckeon MD 07/15/2019, 2:57 PM     Change to inpatient status given worsening kidney function and need for further work-up/treatment.    The patient's care was discussed with the Bedside Nurse, Care Coordinator/, Patient and Patient's Family.    Juliano Mckeon MD  Hospitalist Service  [facility]          Interval History (Subjective):      Patient seen. Friend in room. Eating. Would like to walk. Some low back pain. No chest pain, sob, abdo pain, n/v/d       Physical Exam:      Last Vital Signs:  /50 (BP Location: Left arm)   Pulse 65   Temp 97.6  F (36.4  C) (Oral)   Resp 18   Ht 1.549 m (5' 1\")   Wt 55.2 kg (121 lb 11.2 oz)   SpO2 95%   BMI 23.00 kg/m      General: Alert, awake, no acute distress.  HEENT: Normocephalic and atraumatic, eyes anicteric and without scleral injection, EOMI, face symmetric, MMM.  Cardiac: RRR, normal S1, S2. No m/g/r, no LE edema.  Pulmonary: Normal chest rise, normal work of breathing.  Lungs CTAB without crackles or wheezing.  Abdomen: soft, non-tender, non-distended.  Normoactive bowel sounds, no guarding or rebound tenderness.  : Vallecillo catheter in place draining orange-tinged urine without clots.  Extremities: no deformities.  Warm, well perfused.  Skin: no rashes or lesions.  Warm and Dry.  Neuro: No focal deficits.  Speech clear.  Spontaneous he moving all extremities in bed.  Psych: Alert and oriented x3. Appropriate affect.         Medications:      All current medications were reviewed with changes reflected " in problem list.         Data:      All new lab and imaging data was reviewed.   Labs:  Recent Labs   Lab 07/15/19  0641 07/14/19  1511 07/14/19  0601 07/13/19  1309    137 138 135   POTASSIUM 4.4  --  4.3 4.2   CHLORIDE 110*  --  110* 103   CO2 20  --  22 26   ANIONGAP 7  --  6 6   GLC 86  --  84 84   BUN 40*  --  34* 28   CR 4.07* 3.84* 3.63* 2.80*   GFRESTIMATED 9* 10* 11* 14*   GFRESTBLACK 11* 11* 12* 17*   DAVE 8.0*  --  7.9* 9.1     Recent Labs   Lab 07/15/19  0641 07/13/19  1309 07/11/19  0640   WBC 5.4 8.0 6.5   HGB 8.8* 11.3* 12.3   HCT 28.2* 35.2 37.1   MCV 99 95 94   * 208 243      Imaging:   No results found for this or any previous visit (from the past 24 hour(s)).    Juliano Mckeon MD

## 2019-07-16 LAB
ANION GAP SERPL CALCULATED.3IONS-SCNC: 6 MMOL/L (ref 3–14)
BACTERIA SPEC CULT: NO GROWTH
BASOPHILS # BLD AUTO: 0 10E9/L (ref 0–0.2)
BASOPHILS NFR BLD AUTO: 0.3 %
BUN SERPL-MCNC: 39 MG/DL (ref 7–30)
CALCIUM SERPL-MCNC: 8.2 MG/DL (ref 8.5–10.1)
CHLORIDE SERPL-SCNC: 114 MMOL/L (ref 94–109)
CO2 SERPL-SCNC: 21 MMOL/L (ref 20–32)
CREAT SERPL-MCNC: 3.67 MG/DL (ref 0.52–1.04)
DIFFERENTIAL METHOD BLD: ABNORMAL
EOSINOPHIL # BLD AUTO: 0.3 10E9/L (ref 0–0.7)
EOSINOPHIL NFR BLD AUTO: 7.8 %
ERYTHROCYTE [DISTWIDTH] IN BLOOD BY AUTOMATED COUNT: 16.2 % (ref 10–15)
GFR SERPL CREATININE-BSD FRML MDRD: 10 ML/MIN/{1.73_M2}
GLUCOSE SERPL-MCNC: 86 MG/DL (ref 70–99)
HCT VFR BLD AUTO: 26.2 % (ref 35–47)
HGB BLD-MCNC: 8.2 G/DL (ref 11.7–15.7)
IMM GRANULOCYTES # BLD: 0 10E9/L (ref 0–0.4)
IMM GRANULOCYTES NFR BLD: 0.6 %
LYMPHOCYTES # BLD AUTO: 0.8 10E9/L (ref 0.8–5.3)
LYMPHOCYTES NFR BLD AUTO: 21.4 %
Lab: NORMAL
MAGNESIUM SERPL-MCNC: 1.7 MG/DL (ref 1.6–2.3)
MCH RBC QN AUTO: 30.8 PG (ref 26.5–33)
MCHC RBC AUTO-ENTMCNC: 31.3 G/DL (ref 31.5–36.5)
MCV RBC AUTO: 99 FL (ref 78–100)
MONOCYTES # BLD AUTO: 0.4 10E9/L (ref 0–1.3)
MONOCYTES NFR BLD AUTO: 10 %
NEUTROPHILS # BLD AUTO: 2.2 10E9/L (ref 1.6–8.3)
NEUTROPHILS NFR BLD AUTO: 59.9 %
NRBC # BLD AUTO: 0 10*3/UL
NRBC BLD AUTO-RTO: 0 /100
PLATELET # BLD AUTO: 120 10E9/L (ref 150–450)
POTASSIUM SERPL-SCNC: 4.5 MMOL/L (ref 3.4–5.3)
RBC # BLD AUTO: 2.66 10E12/L (ref 3.8–5.2)
SODIUM SERPL-SCNC: 141 MMOL/L (ref 133–144)
SPECIMEN SOURCE: NORMAL
URATE SERPL-MCNC: 6 MG/DL (ref 2.6–6)
WBC # BLD AUTO: 3.6 10E9/L (ref 4–11)

## 2019-07-16 PROCEDURE — 25000131 ZZH RX MED GY IP 250 OP 636 PS 637: Mod: GY | Performed by: INTERNAL MEDICINE

## 2019-07-16 PROCEDURE — 12000011 ZZH R&B MS OVERFLOW

## 2019-07-16 PROCEDURE — 84550 ASSAY OF BLOOD/URIC ACID: CPT | Performed by: INTERNAL MEDICINE

## 2019-07-16 PROCEDURE — 25000128 H RX IP 250 OP 636: Performed by: HOSPITALIST

## 2019-07-16 PROCEDURE — 25000132 ZZH RX MED GY IP 250 OP 250 PS 637: Mod: GY | Performed by: INTERNAL MEDICINE

## 2019-07-16 PROCEDURE — 80048 BASIC METABOLIC PNL TOTAL CA: CPT | Performed by: INTERNAL MEDICINE

## 2019-07-16 PROCEDURE — 83735 ASSAY OF MAGNESIUM: CPT | Performed by: INTERNAL MEDICINE

## 2019-07-16 PROCEDURE — 25800030 ZZH RX IP 258 OP 636: Performed by: INTERNAL MEDICINE

## 2019-07-16 PROCEDURE — 25000128 H RX IP 250 OP 636: Performed by: INTERNAL MEDICINE

## 2019-07-16 PROCEDURE — 25000132 ZZH RX MED GY IP 250 OP 250 PS 637: Mod: GY | Performed by: HOSPITALIST

## 2019-07-16 PROCEDURE — 99232 SBSQ HOSP IP/OBS MODERATE 35: CPT | Performed by: HOSPITALIST

## 2019-07-16 PROCEDURE — 36415 COLL VENOUS BLD VENIPUNCTURE: CPT | Performed by: INTERNAL MEDICINE

## 2019-07-16 PROCEDURE — 85025 COMPLETE CBC W/AUTO DIFF WBC: CPT | Performed by: INTERNAL MEDICINE

## 2019-07-16 RX ORDER — SODIUM CHLORIDE AND POTASSIUM CHLORIDE 150; 450 MG/100ML; MG/100ML
INJECTION, SOLUTION INTRAVENOUS CONTINUOUS
Status: DISCONTINUED | OUTPATIENT
Start: 2019-07-16 | End: 2019-07-16

## 2019-07-16 RX ORDER — METOPROLOL SUCCINATE 50 MG/1
50 TABLET, EXTENDED RELEASE ORAL DAILY
Status: DISCONTINUED | OUTPATIENT
Start: 2019-07-17 | End: 2019-07-19 | Stop reason: HOSPADM

## 2019-07-16 RX ORDER — SODIUM CHLORIDE 450 MG/100ML
INJECTION, SOLUTION INTRAVENOUS CONTINUOUS
Status: DISCONTINUED | OUTPATIENT
Start: 2019-07-16 | End: 2019-07-16

## 2019-07-16 RX ORDER — CYANOCOBALAMIN 1000 UG/ML
1000 INJECTION, SOLUTION INTRAMUSCULAR; SUBCUTANEOUS ONCE
Status: COMPLETED | OUTPATIENT
Start: 2019-07-16 | End: 2019-07-16

## 2019-07-16 RX ADMIN — PROCHLORPERAZINE EDISYLATE 5 MG: 5 INJECTION INTRAMUSCULAR; INTRAVENOUS at 15:56

## 2019-07-16 RX ADMIN — CYANOCOBALAMIN 1000 MCG: 1000 INJECTION, SOLUTION INTRAMUSCULAR at 13:37

## 2019-07-16 RX ADMIN — Medication 2.5 MG: at 20:54

## 2019-07-16 RX ADMIN — CLONIDINE HYDROCHLORIDE 0.1 MG: 0.1 TABLET ORAL at 20:47

## 2019-07-16 RX ADMIN — SODIUM CHLORIDE: 9 INJECTION, SOLUTION INTRAVENOUS at 02:38

## 2019-07-16 RX ADMIN — AMLODIPINE BESYLATE 10 MG: 10 TABLET ORAL at 08:01

## 2019-07-16 RX ADMIN — IRON SUCROSE 200 MG: 20 INJECTION, SOLUTION INTRAVENOUS at 13:37

## 2019-07-16 RX ADMIN — SERTRALINE HYDROCHLORIDE 100 MG: 100 TABLET ORAL at 08:01

## 2019-07-16 RX ADMIN — LIDOCAINE 1 PATCH: 560 PATCH PERCUTANEOUS; TOPICAL; TRANSDERMAL at 20:47

## 2019-07-16 RX ADMIN — Medication 2.5 MG: at 02:44

## 2019-07-16 RX ADMIN — ONDANSETRON 4 MG: 4 TABLET, ORALLY DISINTEGRATING ORAL at 14:28

## 2019-07-16 RX ADMIN — ACETAMINOPHEN 650 MG: 325 TABLET, FILM COATED ORAL at 02:44

## 2019-07-16 RX ADMIN — CLONIDINE HYDROCHLORIDE 0.1 MG: 0.1 TABLET ORAL at 08:01

## 2019-07-16 ASSESSMENT — MIFFLIN-ST. JEOR: SCORE: 940.73

## 2019-07-16 NOTE — PLAN OF CARE
PRIMARY DIAGNOSIS: AYAKA     OUTPATIENT/OBSERVATION GOALS TO BE MET BEFORE DISCHARGE  1. Orthostatic performed: No    2. Tolerating PO medications: Yes    3. Return to near baseline physical activity: No    4. Cleared for discharge by consultants (if involved): No    Discharge Planner Nurse   Safe discharge environment identified: Yes  Barriers to discharge: Yes       Entered by: Ange Wood 07/15/2019 11:34 PM     Please review provider order for any additional goals.   Nurse to notify provider when observation goals have been met and patient is ready for discharge.    VSS, on RA, afebrile. Up Ax1 with walker and gait belt. Tolerating renal diet, good appetite. Has been having frequent stools, soft formed. Also has been retaining urine. Post void residual amounts were 200, 470, and 386. Writer paged hospitalist and did not hear back if pt should be cathed or not. Writer passed onto nurse to watch for this. NS running at 100mL/hr. Strict I & Os. Denies nausea, sob, dizziness. Gave Oxy x2 for chronic back pain. Lidocaine patch placed on lower back. Contact precautions for ESBL. Nephrology consult today- see note. Pt did have relief with the heating pad but with the lidocaine patch writer educated pt the heating pad could not be used with the patch. Plan- encourage movement, continue fluids, pain control, am labs.

## 2019-07-16 NOTE — PLAN OF CARE
Vitals are Temp: 96.3  F (35.7  C) Temp src: Oral BP: 115/40 Pulse: 65 Heart Rate: 54 Resp: 16 SpO2: 94 %.  Patient is Alert and Oriented x4. They are SBA with Activity.  Patient is on Contact precuations for ESBL.  Pt is a Renal diet.  They are complaining of 8/10 pain in their back.  Tylenol and Oxycodone given for pain.  Patient has Normal Saline 0.9% running at 100 mL per hour.  Plan of care is a nephrology consult and AM labs.

## 2019-07-16 NOTE — PROGRESS NOTES
[facility]  Hospitalist Progress Note  Juliano Mckeon MD 07/14/19  Text Page  Pager: [phone]  Reason for Stay (Diagnosis): AYAKA         Assessment and Plan:      Summary of Stay: Daya Ignacio is a 88 year old female with past medical history of hypertension, chronic anemia, lumbar compression fractures with chronic back pain, multiple hospitalizations for recent ESBL UTIs as well as C. difficile and recent likely diverticular GI bleed and recent ER visit with back pain/suprapubic pain (started on Bactrim for possible UTI) who was admitted on 7/13/2019 with generalized weakness and was found to have acute kidney injury as well as urinary retention.    Acute renal failure    - likely ATN: due to lisinopril, bactrim and NSAIDs    - seen by nephrology    - cont IVF (change to .45NS)    - UA done, renal US normal    - holding lisinopril, NSAIDs and stopped Bactrim    - holding gabapentin    Possible syncope    - prior to admission    - likely due to renal failure    - no further work-up    Possible mild L2 compression fx, age undetermined, chronic back pain    - recent increase in low back pain    - X-ray showing decreased height of the L2 vertebral body so cannot rule out a mild compression fracture although age indeterminate.    - on low dose oxy    - add lidocaine patches    - doing well    - ambulating multiple times a day    Recent ESBL UTIs    - cleared    - no further treatment    - recent cultures negative x 2     Recent Cdif    -  Recent C. difficile infection treated with oral vancomycin which she just finished    - Diarrhea completely resolved.  No abdominal pain.     Physical deconditioning    - ambulate today with staff    HTN    - cont amlodipine 10 mg daily, clonidine 0.1 mg twice daily, metoprolol succinate 100 mg daily and Isordil 10 mg 3 times daily.      -Hold lisinopril for acute kidney injury     Anemia recent GI bleed    - likely partially dilutional    - re-check in  "am     Anxiety     - resume pta sertraline 100mg daily.    Had urinary retention and vallecillo was placed    - discontinued vallecillo yesterday    - no further trouble    Speaking to son daily to update    Diet: Renal Diet (non-dialysis)    DVT Prophylaxis: Low Risk/Ambulatory with no VTE prophylaxis indicated  Vallecillo Catheter: not present  Code Status: Full Code      Disposition Plan   Expected discharge: 2 - 3 days, recommended to prior living arrangement once renal function improved.  Entered: Juliano Mckeon MD 07/16/2019, 11:09 AM     Change to inpatient status given worsening kidney function and need for further work-up/treatment.    The patient's care was discussed with the Bedside Nurse, Care Coordinator/, Patient and Patient's Family.    Juliano Mckeon MD  Hospitalist Service  [facility]          Interval History (Subjective):      Patient seen. In chair. Doing well today. Back pain better. No chest pain, sob, abdo pain, n/v/d       Physical Exam:      Last Vital Signs:  /55 (BP Location: Left arm)   Pulse 54   Temp 95.8  F (35.4  C) (Oral)   Resp 16   Ht 1.549 m (5' 1\")   Wt 57.3 kg (126 lb 6.4 oz)   SpO2 94%   BMI 23.88 kg/m      General: Alert, awake, no acute distress.  HEENT: Normocephalic and atraumatic, eyes anicteric and without scleral injection, EOMI, face symmetric, MMM.  Cardiac: RRR, normal S1, S2. No m/g/r, no LE edema.  Pulmonary: Normal chest rise, normal work of breathing.  Lungs CTAB without crackles or wheezing.  Abdomen: soft, non-tender, non-distended.  Normoactive bowel sounds, no guarding or rebound tenderness.  : Vallecillo catheter in place draining orange-tinged urine without clots.  Extremities: no deformities.  Warm, well perfused.  Skin: no rashes or lesions.  Warm and Dry.  Neuro: No focal deficits.  Speech clear.  Spontaneous he moving all extremities in bed.  Psych: Alert and oriented x3. Appropriate affect.         Medications:      All current " medications were reviewed with changes reflected in problem list.         Data:      All new lab and imaging data was reviewed.   Labs:  Recent Labs   Lab 07/16/19  0525 07/15/19  0641 07/14/19  1511 07/14/19  0601    137 137 138   POTASSIUM 4.5 4.4  --  4.3   CHLORIDE 114* 110*  --  110*   CO2 21 20  --  22   ANIONGAP 6 7  --  6   GLC 86 86  --  84   BUN 39* 40*  --  34*   CR 3.67* 4.07* 3.84* 3.63*   GFRESTIMATED 10* 9* 10* 11*   GFRESTBLACK 12* 11* 11* 12*   DAVE 8.2* 8.0*  --  7.9*     Recent Labs   Lab 07/16/19  0525 07/15/19  0641 07/13/19  1309   WBC 3.6* 5.4 8.0   HGB 8.2* 8.8* 11.3*   HCT 26.2* 28.2* 35.2   MCV 99 99 95   * 126* 208      Imaging:   No results found for this or any previous visit (from the past 24 hour(s)).    Juliano Mckeon MD

## 2019-07-16 NOTE — PROGRESS NOTES
Assessment and Plan:   AYAKA: likely due to NSAID. Some contribution from ACEI and dehydration   I/O 1981/1000. Wt 55.2 (7/13) > > 57.3 (today). Cr: 4.07 > 3.67. K 4.5, HCO3 21.     Will stop IVF. Monitor labs. She seems to be improving.   If Cr continues to improve on am labs then could be D/C'd with F/U with primary MD in one week for lab check.             Interval History:   HT: on norvasc, clonidine, metoprolol. BP well controlled, avg 138/52. Bradycardic. Will reduce metoprolol.   Back pain: compression fracture.     Anemia: Ferr 131, Fe 39, folate 17.5, B12 377. Consistent with Fe def and borderline B12.  Hgb 8.2. Will replace B12 and FE.             Review of Systems:   Taking po well, No N or V, no SOB or chest pain.           Medications:       amLODIPine  10 mg Oral Daily     cloNIDine  0.1 mg Oral BID     lidocaine  1 patch Transdermal Q24h    And     lidocaine   Transdermal Q24H    And     lidocaine   Transdermal Q8H     metoprolol succinate ER  100 mg Oral Daily     sertraline  100 mg Oral Daily       NaCl       Current active medications and PTA medications reviewed, see medication list for details.            Physical Exam:   Vitals were reviewed  Patient Vitals for the past 24 hrs:   BP Temp Temp src Pulse Heart Rate Resp SpO2 Weight   07/16/19 1107 129/55 -- -- -- -- -- -- --   07/16/19 1105 (!) 133/39 95.8  F (35.4  C) Oral -- 58 16 94 % --   07/16/19 0652 161/65 96.2  F (35.7  C) Oral 54 -- 18 95 % 57.3 kg (126 lb 6.4 oz)   07/16/19 0011 115/40 96.3  F (35.7  C) Oral -- 54 16 94 % --   07/15/19 2059 145/53 -- -- -- 62 -- -- --   07/15/19 1841 153/62 96  F (35.6  C) Oral -- 65 20 -- --   07/15/19 1555 133/48 96.5  F (35.8  C) Oral -- 59 20 95 % --       Temp:  [95.8  F (35.4  C)-96.5  F (35.8  C)] 95.8  F (35.4  C)  Pulse:  [54] 54  Heart Rate:  [54-65] 58  Resp:  [16-20] 16  BP: (115-161)/(39-65) 129/55  SpO2:  [94 %-95 %] 94 %    Temperatures:  Current - Temp: 95.8  F (35.4  C); Max -  Temp  Av.2  F (35.7  C)  Min: 95.8  F (35.4  C)  Max: 96.5  F (35.8  C)  Respiration range: Resp  Av  Min: 16  Max: 20  Pulse range: Pulse  Av  Min: 54  Max: 54  Blood pressure range: Systolic (24hrs), Av , Min:115 , Max:161   ; Diastolic (24hrs), Av, Min:39, Max:65    Pulse oximetry range: SpO2  Av.5 %  Min: 94 %  Max: 95 %    I/O last 3 completed shifts:  In: -   Out: 550 [Urine:550]      Intake/Output Summary (Last 24 hours) at 2019 1149  Last data filed at 2019 1110  Gross per 24 hour   Intake 2567 ml   Output 950 ml   Net 1617 ml       Alert and responsive  Lungs with clear BS  Cor no JVD, nl S1 S2 no M  LE trace edema       Wt Readings from Last 4 Encounters:   19 57.3 kg (126 lb 6.4 oz)   19 52.6 kg (116 lb)   19 55.3 kg (122 lb)   19 54 kg (119 lb)          Data:          Lab Results   Component Value Date     2019     07/15/2019     2019      Lab Results   Component Value Date    CHLORIDE 114 2019    CHLORIDE 110 07/15/2019    CHLORIDE 110 2019    Lab Results   Component Value Date    BUN 39 2019    BUN 40 07/15/2019    BUN 34 2019      Lab Results   Component Value Date    POTASSIUM 4.5 2019    POTASSIUM 4.4 07/15/2019    POTASSIUM 4.3 2019    Lab Results   Component Value Date    CO2 21 2019    CO2 20 07/15/2019    CO2 22 2019    Lab Results   Component Value Date    CR 3.67 2019    CR 4.07 07/15/2019    CR 3.84 2019        Recent Labs   Lab Test 19  0525 07/15/19  0641 19  1309   WBC 3.6* 5.4 8.0   HGB 8.2* 8.8* 11.3*   HCT 26.2* 28.2* 35.2   MCV 99 99 95   * 126* 208     Recent Labs   Lab Test 19  1531 19  1022 18  1145   AST 24 13 17   ALT 17 15 17   ALKPHOS 83 65 75   BILITOTAL 0.6 0.7 1.0       Recent Labs   Lab Test 19  0525 19  0541 19  0606   MAG 1.7 1.9 2.1     Recent Labs   Lab Test  07/15/19  1040 06/24/11  0440 06/23/11  1800   PHOS 5.5* 4.1 4.4     Recent Labs   Lab Test 07/16/19  0525 07/15/19  0641 07/14/19  0601   DAVE 8.2* 8.0* 7.9*     Lab Results   Component Value Date    DAVE 8.2 (L) 07/16/2019     Lab Results   Component Value Date    WBC 3.6 (L) 07/16/2019    HGB 8.2 (L) 07/16/2019    HCT 26.2 (L) 07/16/2019    MCV 99 07/16/2019     (L) 07/16/2019     Lab Results   Component Value Date     07/16/2019    POTASSIUM 4.5 07/16/2019    CHLORIDE 114 (H) 07/16/2019    CO2 21 07/16/2019    GLC 86 07/16/2019     Lab Results   Component Value Date    BUN 39 (H) 07/16/2019    CR 3.67 (H) 07/16/2019     Lab Results   Component Value Date    MAG 1.7 07/16/2019     Lab Results   Component Value Date    PHOS 5.5 (H) 07/15/2019       Creatinine   Date Value Ref Range Status   07/16/2019 3.67 (H) 0.52 - 1.04 mg/dL Final   07/15/2019 4.07 (H) 0.52 - 1.04 mg/dL Final   07/14/2019 3.84 (H) 0.52 - 1.04 mg/dL Final   07/14/2019 3.63 (H) 0.52 - 1.04 mg/dL Final   07/13/2019 2.80 (H) 0.52 - 1.04 mg/dL Final   07/11/2019 0.51 (L) 0.52 - 1.04 mg/dL Final       Attestation:  I have reviewed today's vital signs, notes, medications, labs and imaging.     Jose Luis Barragan MD

## 2019-07-17 LAB
ANION GAP SERPL CALCULATED.3IONS-SCNC: 4 MMOL/L (ref 3–14)
BASOPHILS # BLD AUTO: 0 10E9/L (ref 0–0.2)
BASOPHILS NFR BLD AUTO: 0.5 %
BUN SERPL-MCNC: 34 MG/DL (ref 7–30)
CALCIUM SERPL-MCNC: 8.6 MG/DL (ref 8.5–10.1)
CHLORIDE SERPL-SCNC: 111 MMOL/L (ref 94–109)
CO2 SERPL-SCNC: 24 MMOL/L (ref 20–32)
CREAT SERPL-MCNC: 2.8 MG/DL (ref 0.52–1.04)
DIFFERENTIAL METHOD BLD: ABNORMAL
EOSINOPHIL # BLD AUTO: 0.2 10E9/L (ref 0–0.7)
EOSINOPHIL NFR BLD AUTO: 4 %
ERYTHROCYTE [DISTWIDTH] IN BLOOD BY AUTOMATED COUNT: 15.7 % (ref 10–15)
GFR SERPL CREATININE-BSD FRML MDRD: 14 ML/MIN/{1.73_M2}
GLUCOSE SERPL-MCNC: 92 MG/DL (ref 70–99)
HCT VFR BLD AUTO: 30.5 % (ref 35–47)
HGB BLD-MCNC: 9.7 G/DL (ref 11.7–15.7)
IMM GRANULOCYTES # BLD: 0 10E9/L (ref 0–0.4)
IMM GRANULOCYTES NFR BLD: 0.9 %
LYMPHOCYTES # BLD AUTO: 0.9 10E9/L (ref 0.8–5.3)
LYMPHOCYTES NFR BLD AUTO: 20 %
MCH RBC QN AUTO: 30.7 PG (ref 26.5–33)
MCHC RBC AUTO-ENTMCNC: 31.8 G/DL (ref 31.5–36.5)
MCV RBC AUTO: 97 FL (ref 78–100)
MONOCYTES # BLD AUTO: 0.4 10E9/L (ref 0–1.3)
MONOCYTES NFR BLD AUTO: 8.7 %
NEUTROPHILS # BLD AUTO: 2.8 10E9/L (ref 1.6–8.3)
NEUTROPHILS NFR BLD AUTO: 65.9 %
NRBC # BLD AUTO: 0 10*3/UL
NRBC BLD AUTO-RTO: 0 /100
PLATELET # BLD AUTO: 153 10E9/L (ref 150–450)
POTASSIUM SERPL-SCNC: 4.3 MMOL/L (ref 3.4–5.3)
RBC # BLD AUTO: 3.16 10E12/L (ref 3.8–5.2)
SODIUM SERPL-SCNC: 139 MMOL/L (ref 133–144)
WBC # BLD AUTO: 4.3 10E9/L (ref 4–11)

## 2019-07-17 PROCEDURE — 25000132 ZZH RX MED GY IP 250 OP 250 PS 637: Mod: GY | Performed by: INTERNAL MEDICINE

## 2019-07-17 PROCEDURE — 25000132 ZZH RX MED GY IP 250 OP 250 PS 637: Mod: GY | Performed by: HOSPITALIST

## 2019-07-17 PROCEDURE — 12000011 ZZH R&B MS OVERFLOW

## 2019-07-17 PROCEDURE — 85025 COMPLETE CBC W/AUTO DIFF WBC: CPT | Performed by: HOSPITALIST

## 2019-07-17 PROCEDURE — 99232 SBSQ HOSP IP/OBS MODERATE 35: CPT | Performed by: HOSPITALIST

## 2019-07-17 PROCEDURE — 36415 COLL VENOUS BLD VENIPUNCTURE: CPT | Performed by: HOSPITALIST

## 2019-07-17 PROCEDURE — 80048 BASIC METABOLIC PNL TOTAL CA: CPT | Performed by: HOSPITALIST

## 2019-07-17 RX ORDER — GABAPENTIN 800 MG/1
400 TABLET ORAL 2 TIMES DAILY
COMMUNITY
Start: 2019-07-17 | End: 2019-07-18

## 2019-07-17 RX ORDER — OXYCODONE HYDROCHLORIDE 5 MG/1
2.5 TABLET ORAL EVERY 4 HOURS PRN
Qty: 10 TABLET | Refills: 0 | Status: SHIPPED | OUTPATIENT
Start: 2019-07-17 | End: 2019-07-28

## 2019-07-17 RX ORDER — ACETAMINOPHEN 325 MG/1
650 TABLET ORAL EVERY 4 HOURS PRN
COMMUNITY
Start: 2019-07-17 | End: 2019-08-21

## 2019-07-17 RX ORDER — GABAPENTIN 800 MG/1
400 TABLET ORAL AT BEDTIME
COMMUNITY
Start: 2019-07-17 | End: 2019-07-18

## 2019-07-17 RX ADMIN — AMLODIPINE BESYLATE 10 MG: 10 TABLET ORAL at 08:19

## 2019-07-17 RX ADMIN — LIDOCAINE 1 PATCH: 560 PATCH PERCUTANEOUS; TOPICAL; TRANSDERMAL at 20:37

## 2019-07-17 RX ADMIN — Medication 2.5 MG: at 20:38

## 2019-07-17 RX ADMIN — CLONIDINE HYDROCHLORIDE 0.1 MG: 0.1 TABLET ORAL at 20:38

## 2019-07-17 RX ADMIN — SERTRALINE HYDROCHLORIDE 100 MG: 100 TABLET ORAL at 08:19

## 2019-07-17 RX ADMIN — CLONIDINE HYDROCHLORIDE 0.1 MG: 0.1 TABLET ORAL at 08:19

## 2019-07-17 RX ADMIN — Medication 2.5 MG: at 16:13

## 2019-07-17 RX ADMIN — METOPROLOL SUCCINATE 50 MG: 50 TABLET, EXTENDED RELEASE ORAL at 08:19

## 2019-07-17 RX ADMIN — Medication 2.5 MG: at 12:15

## 2019-07-17 NOTE — PLAN OF CARE
"/63 (BP Location: Left arm)   Pulse 65   Temp 96  F (35.6  C) (Oral)   Resp 16   Ht 1.549 m (5' 1\")   Wt 57.3 kg (126 lb 6.4 oz)   SpO2 94%   BMI 23.88 kg/m      Neuro: WDL  Cardiac: WDL  Lungs: WDL  GI: WDL, frequency  : WDL  Pain: Low back pain is baseline, low dose oxy brings it down to 4-5/10  IV: saline locked   Meds: 2.5 mg of oxy for back pain   Diet: Renal diet  Activity: SBA with walker   Plan: Patient denied iron infusion this AM, it makes her too nauseous. Patient was able to ambulate the length of all the halls in the unit. Will continue to monitor and provide cares.     "

## 2019-07-17 NOTE — PLAN OF CARE
Vital signs:  Temp: 96.8  F (36  C) Temp src: Oral BP: 156/60 Pulse: 59 Heart Rate: 59 Resp: 14 SpO2: 99 %    Neuros: WNL  GI: Bowel sounds positive x 4  : Up and voiding well  SKIN: Scattered bruises  Activity: Assist of 1 with walker and GB  Diet: Renal  Pain: C/o lower back pain. Had 2.5 mg of oxycodone  x1 and lidocaine patch to lower back  Plan:nephrology following, will discharge back home

## 2019-07-17 NOTE — PLAN OF CARE
"/43 (BP Location: Right arm)   Pulse 59   Temp 96.9  F (36.1  C) (Oral)   Resp 16   Ht 1.549 m (5' 1\")   Wt 57.3 kg (126 lb 6.4 oz)   SpO2 95%   BMI 23.88 kg/m      Neuro: WDL  Cardiac: WDL  Lungs: WDL  GI: WDL  : ex., strict I/O's, daily weights  Pain: ex. Low back pain (baseline)  IV: Saline locked   Meds: Lidocaine patch  Diet: Regular  Activity: SBA with walker  Misc: Patient states she feels like she is not emptying, patient bladder scanned for 382  Plan: Patient is stable and on room air.  Assist of SBA with walker, on a regular diet. Will continue to monitor and provide cares.     "

## 2019-07-17 NOTE — PROGRESS NOTES
[facility]  Hospitalist Progress Note  Juliano Mckeon MD 07/14/19  Text Page  Pager: [phone]  Reason for Stay (Diagnosis): AYAKA         Assessment and Plan:      Summary of Stay: Daya Ignacio is a 88 year old female with past medical history of hypertension, chronic anemia, lumbar compression fractures with chronic back pain, multiple hospitalizations for recent ESBL UTIs as well as C. difficile and recent likely diverticular GI bleed and recent ER visit with back pain/suprapubic pain (started on Bactrim for possible UTI) who was admitted on 7/13/2019 with generalized weakness and was found to have acute kidney injury as well as urinary retention (retention now resolved)    Acute renal failure    - likely ATN: due to lisinopril, bactrim and NSAIDs    - seen by nephrology    -IVF now stopped    - UA done, renal US normal    - holding lisinopril, NSAIDs and stopped Bactrim    - holding gabapentin    - Cr now 2.8 (from 3.6, had peaked at 4)    Possible syncope    - prior to admission    - likely due to renal failure    - no further work-up    Possible mild L2 compression fx, age undetermined, chronic back pain    - recent increase in low back pain    - X-ray showing decreased height of the L2 vertebral body so cannot rule out a mild compression fracture although age indeterminate.    - on low dose oxy    - add lidocaine patches    - doing well    - ambulating multiple times a day    Recent ESBL UTIs    - cleared    - no further treatment    - recent cultures negative x 2     Recent Cdif    -  Recent C. difficile infection treated with oral vancomycin which she just finished    - Diarrhea completely resolved.  No abdominal pain.     Physical deconditioning    - ambulate today with staff    HTN    - cont amlodipine 10 mg daily, clonidine 0.1 mg twice daily, metoprolol succinate 100 mg daily and Isordil 10 mg 3 times daily.      -Hold lisinopril for acute kidney injury     Anemia recent GI bleed    -  "likely partially dilutional    - re-check in am    - IV iron ordered by nephrology (patient refused today)     Anxiety     - resume pta sertraline 100mg daily.    Had urinary retention and vallecillo was placed    - discontinued vallecillo Monday    - no further trouble    I offered to discharge patient today as her Cr has improved and she no longer needs IVF. She feels more comfortable if she could discharge tomorrow. She is elderly and lives alone. Will increase ambulation today with plan of discharge tomorrow. I have done her discharge orders (and filled low dose oxy) in preparation for tomorrow's discharge.    Speaking to son daily to update (answered questions. He is comfortable with plan)    Diet: Renal Diet (non-dialysis)    DVT Prophylaxis: Low Risk/Ambulatory with no VTE prophylaxis indicated  Vallecillo Catheter: not present  Code Status: Full Code      Disposition Plan   Expected discharge: tomorrow, recommended to prior living arrangement once renal function improved.  Entered: Juliano Mckeon MD 07/17/2019, 8:33 AM     The patient's care was discussed with the Bedside Nurse, Care Coordinator/, Patient and Patient's Family.    Juliano Mckeon MD  Hospitalist Service  [facility]          Interval History (Subjective):      Patient seen. In chair. Doing well today. Back pain better. No chest pain, sob, abdo pain, n/v/d. Eating well. Ambulating.       Physical Exam:      Last Vital Signs:  /67 (BP Location: Left arm)   Pulse 65   Temp 97.2  F (36.2  C) (Oral)   Resp 16   Ht 1.549 m (5' 1\")   Wt 57.3 kg (126 lb 6.4 oz)   SpO2 97%   BMI 23.88 kg/m      General: Alert, awake, no acute distress.  HEENT: Normocephalic and atraumatic, eyes anicteric and without scleral injection, EOMI, face symmetric, MMM.  Cardiac: RRR, normal S1, S2. No m/g/r, no LE edema.  Pulmonary: Normal chest rise, normal work of breathing.  Lungs CTAB without crackles or wheezing.  Abdomen: soft, non-tender, " non-distended.  Normoactive bowel sounds, no guarding or rebound tenderness.  : no vallecillo  Extremities: no deformities.  Warm, well perfused.  Skin: no rashes or lesions.  Warm and Dry.  Neuro: No focal deficits.  Speech clear.  Spontaneous he moving all extremities in bed.  Psych: Alert and oriented x3. Appropriate affect.         Medications:      All current medications were reviewed with changes reflected in problem list.         Data:      All new lab and imaging data was reviewed.   Labs:  Recent Labs   Lab 07/17/19  0710 07/16/19  0525 07/15/19  0641    141 137   POTASSIUM 4.3 4.5 4.4   CHLORIDE 111* 114* 110*   CO2 24 21 20   ANIONGAP 4 6 7   GLC 92 86 86   BUN 34* 39* 40*   CR 2.80* 3.67* 4.07*   GFRESTIMATED 14* 10* 9*   GFRESTBLACK 17* 12* 11*   DAVE 8.6 8.2* 8.0*     Recent Labs   Lab 07/17/19  0710 07/16/19  0525 07/15/19  0641   WBC 4.3 3.6* 5.4   HGB 9.7* 8.2* 8.8*   HCT 30.5* 26.2* 28.2*   MCV 97 99 99    120* 126*      Imaging:   No results found for this or any previous visit (from the past 24 hour(s)).    Juliano Mckeon MD

## 2019-07-17 NOTE — PROGRESS NOTES
Assessment and Plan:   AYAKA: Cr continues to improve. UO reasonable and lytes are nominal. Ok for discharge. Suggest F/U labs with primary MD early next week (renal panel, Hbg). Would not restart ACEI at this point until renal function back to baseline.              Interval History:   Hypertension: avg /53 last 24 h. On amlodipine, clonidine and metoprolol. Borderline bradycardia on clonidine and metoprolol. Cont same meds until F/U as outpt.    Anemia:   Hgb improved. Got venofer and B12 parenterally.   F/U as outpt.             Review of Systems:   C/O nausea after venofer infusion. Ambulating with assistance. Taking po well. No pain.           Medications:       amLODIPine  10 mg Oral Daily     cloNIDine  0.1 mg Oral BID     iron sucrose (VENOFER) intermittent infusion  200 mg Intravenous Daily     lidocaine  1 patch Transdermal Q24h    And     lidocaine   Transdermal Q24H    And     lidocaine   Transdermal Q8H     metoprolol succinate ER  50 mg Oral Daily     sertraline  100 mg Oral Daily         Current active medications and PTA medications reviewed, see medication list for details.            Physical Exam:   Vitals were reviewed  Patient Vitals for the past 24 hrs:   BP Temp Temp src Pulse Heart Rate Resp SpO2   07/17/19 0824 178/67 97.2  F (36.2  C) Oral 65 -- 16 97 %   07/17/19 0348 156/60 96.8  F (36  C) Oral -- 59 14 --   07/17/19 0045 137/61 96.5  F (35.8  C) Oral -- 57 14 99 %   07/16/19 1940 137/55 96.8  F (36  C) Oral -- 62 16 94 %   07/16/19 1521 138/43 96.9  F (36.1  C) Oral -- 58 16 95 %   07/16/19 1424 137/55 96.7  F (35.9  C) Oral -- 62 16 96 %   07/16/19 1350 144/46 -- -- 59 -- 16 97 %   07/16/19 1107 129/55 -- -- -- -- -- --   07/16/19 1105 (!) 133/39 95.8  F (35.4  C) Oral -- 58 16 94 %       Temp:  [95.8  F (35.4  C)-97.2  F (36.2  C)] 97.2  F (36.2  C)  Pulse:  [59-65] 65  Heart Rate:  [57-62] 59  Resp:  [14-16] 16  BP: (129-178)/(39-67) 178/67  SpO2:  [94 %-99 %] 97  %    Temperatures:  Current - Temp: 97.2  F (36.2  C); Max - Temp  Av.7  F (35.9  C)  Min: 95.8  F (35.4  C)  Max: 97.2  F (36.2  C)  Respiration range: Resp  Avg: 15.5  Min: 14  Max: 16  Pulse range: Pulse  Av  Min: 59  Max: 65  Blood pressure range: Systolic (24hrs), Av , Min:129 , Max:178   ; Diastolic (24hrs), Av, Min:39, Max:67    Pulse oximetry range: SpO2  Av %  Min: 94 %  Max: 99 %    I/O last 3 completed shifts:  In: 2567 [P.O.:240; I.V.:2327]  Out: 1750 [Urine:1750]      Intake/Output Summary (Last 24 hours) at 2019 1037  Last data filed at 2019 0853  Gross per 24 hour   Intake --   Output 1800 ml   Net -1800 ml       Alert and responsive  LE no edema     I/O 2567/925       Wt Readings from Last 4 Encounters:   19 57.3 kg (126 lb 6.4 oz)   19 52.6 kg (116 lb)   19 55.3 kg (122 lb)   19 54 kg (119 lb)          Data:          Lab Results   Component Value Date     2019     2019     07/15/2019      Lab Results   Component Value Date    CHLORIDE 111 2019    CHLORIDE 114 2019    CHLORIDE 110 07/15/2019    Lab Results   Component Value Date    BUN 34 2019    BUN 39 2019    BUN 40 07/15/2019      Lab Results   Component Value Date    POTASSIUM 4.3 2019    POTASSIUM 4.5 2019    POTASSIUM 4.4 07/15/2019    Lab Results   Component Value Date    CO2 24 2019    CO2 21 2019    CO2 20 07/15/2019    Lab Results   Component Value Date    CR 2.80 2019    CR 3.67 2019    CR 4.07 07/15/2019        Recent Labs   Lab Test 19  0710 19  0525 07/15/19  0641   WBC 4.3 3.6* 5.4   HGB 9.7* 8.2* 8.8*   HCT 30.5* 26.2* 28.2*   MCV 97 99 99    120* 126*     Recent Labs   Lab Test 19  1531 19  1022 18  1145   AST 24 13 17   ALT 17 15 17   ALKPHOS 83 65 75   BILITOTAL 0.6 0.7 1.0       Recent Labs   Lab Test 19  0525 19  0541 19  0606    MAG 1.7 1.9 2.1     Recent Labs   Lab Test 07/15/19  1040 06/24/11  0440 06/23/11  1800   PHOS 5.5* 4.1 4.4     Recent Labs   Lab Test 07/17/19  0710 07/16/19  0525 07/15/19  0641   DAVE 8.6 8.2* 8.0*     Lab Results   Component Value Date    DAVE 8.6 07/17/2019     Lab Results   Component Value Date    WBC 4.3 07/17/2019    HGB 9.7 (L) 07/17/2019    HCT 30.5 (L) 07/17/2019    MCV 97 07/17/2019     07/17/2019     Lab Results   Component Value Date     07/17/2019    POTASSIUM 4.3 07/17/2019    CHLORIDE 111 (H) 07/17/2019    CO2 24 07/17/2019    GLC 92 07/17/2019     Lab Results   Component Value Date    BUN 34 (H) 07/17/2019    CR 2.80 (H) 07/17/2019     Lab Results   Component Value Date    MAG 1.7 07/16/2019     Lab Results   Component Value Date    PHOS 5.5 (H) 07/15/2019       Creatinine   Date Value Ref Range Status   07/17/2019 2.80 (H) 0.52 - 1.04 mg/dL Final   07/16/2019 3.67 (H) 0.52 - 1.04 mg/dL Final   07/15/2019 4.07 (H) 0.52 - 1.04 mg/dL Final   07/14/2019 3.84 (H) 0.52 - 1.04 mg/dL Final   07/14/2019 3.63 (H) 0.52 - 1.04 mg/dL Final   07/13/2019 2.80 (H) 0.52 - 1.04 mg/dL Final       Attestation:  I have reviewed today's vital signs, notes, medications, labs and imaging.     Jose Luis Barragan MD

## 2019-07-18 LAB
ANION GAP SERPL CALCULATED.3IONS-SCNC: 4 MMOL/L (ref 3–14)
BUN SERPL-MCNC: 34 MG/DL (ref 7–30)
CALCIUM SERPL-MCNC: 8.3 MG/DL (ref 8.5–10.1)
CHLORIDE SERPL-SCNC: 107 MMOL/L (ref 94–109)
CO2 SERPL-SCNC: 27 MMOL/L (ref 20–32)
CREAT SERPL-MCNC: 1.89 MG/DL (ref 0.52–1.04)
GFR SERPL CREATININE-BSD FRML MDRD: 23 ML/MIN/{1.73_M2}
GLUCOSE SERPL-MCNC: 93 MG/DL (ref 70–99)
POTASSIUM SERPL-SCNC: 3.8 MMOL/L (ref 3.4–5.3)
SODIUM SERPL-SCNC: 138 MMOL/L (ref 133–144)

## 2019-07-18 PROCEDURE — 25000132 ZZH RX MED GY IP 250 OP 250 PS 637: Mod: GY | Performed by: HOSPITALIST

## 2019-07-18 PROCEDURE — 25000132 ZZH RX MED GY IP 250 OP 250 PS 637: Mod: GY | Performed by: INTERNAL MEDICINE

## 2019-07-18 PROCEDURE — 12000011 ZZH R&B MS OVERFLOW

## 2019-07-18 PROCEDURE — 80048 BASIC METABOLIC PNL TOTAL CA: CPT | Performed by: HOSPITALIST

## 2019-07-18 PROCEDURE — 25000131 ZZH RX MED GY IP 250 OP 636 PS 637: Mod: GY | Performed by: INTERNAL MEDICINE

## 2019-07-18 PROCEDURE — 99232 SBSQ HOSP IP/OBS MODERATE 35: CPT | Performed by: INTERNAL MEDICINE

## 2019-07-18 PROCEDURE — 36415 COLL VENOUS BLD VENIPUNCTURE: CPT | Performed by: HOSPITALIST

## 2019-07-18 PROCEDURE — 25000128 H RX IP 250 OP 636: Performed by: INTERNAL MEDICINE

## 2019-07-18 PROCEDURE — 25000128 H RX IP 250 OP 636: Performed by: HOSPITALIST

## 2019-07-18 RX ORDER — GABAPENTIN 100 MG/1
200 CAPSULE ORAL 2 TIMES DAILY
Qty: 120 CAPSULE | Refills: 0 | Status: ON HOLD | OUTPATIENT
Start: 2019-07-18 | End: 2021-08-17

## 2019-07-18 RX ORDER — METOCLOPRAMIDE 5 MG/1
5 TABLET ORAL EVERY 6 HOURS PRN
Status: DISCONTINUED | OUTPATIENT
Start: 2019-07-18 | End: 2019-07-19 | Stop reason: HOSPADM

## 2019-07-18 RX ORDER — GABAPENTIN 100 MG/1
200 CAPSULE ORAL 2 TIMES DAILY
Status: DISCONTINUED | OUTPATIENT
Start: 2019-07-18 | End: 2019-07-19 | Stop reason: HOSPADM

## 2019-07-18 RX ADMIN — GABAPENTIN 200 MG: 100 CAPSULE ORAL at 11:42

## 2019-07-18 RX ADMIN — GABAPENTIN 200 MG: 100 CAPSULE ORAL at 19:29

## 2019-07-18 RX ADMIN — METOPROLOL SUCCINATE 50 MG: 50 TABLET, EXTENDED RELEASE ORAL at 08:35

## 2019-07-18 RX ADMIN — PROCHLORPERAZINE EDISYLATE 5 MG: 5 INJECTION INTRAMUSCULAR; INTRAVENOUS at 18:04

## 2019-07-18 RX ADMIN — CLONIDINE HYDROCHLORIDE 0.1 MG: 0.1 TABLET ORAL at 19:28

## 2019-07-18 RX ADMIN — Medication 2.5 MG: at 00:49

## 2019-07-18 RX ADMIN — AMLODIPINE BESYLATE 10 MG: 10 TABLET ORAL at 08:35

## 2019-07-18 RX ADMIN — SODIUM CHLORIDE 500 ML: 9 INJECTION, SOLUTION INTRAVENOUS at 11:42

## 2019-07-18 RX ADMIN — Medication 2.5 MG: at 14:30

## 2019-07-18 RX ADMIN — SERTRALINE HYDROCHLORIDE 100 MG: 100 TABLET ORAL at 08:35

## 2019-07-18 RX ADMIN — Medication 2.5 MG: at 08:43

## 2019-07-18 RX ADMIN — METOCLOPRAMIDE HYDROCHLORIDE 5 MG: 5 TABLET ORAL at 12:20

## 2019-07-18 RX ADMIN — ONDANSETRON 4 MG: 4 TABLET, ORALLY DISINTEGRATING ORAL at 08:50

## 2019-07-18 RX ADMIN — PROCHLORPERAZINE EDISYLATE 5 MG: 5 INJECTION INTRAMUSCULAR; INTRAVENOUS at 09:44

## 2019-07-18 RX ADMIN — CLONIDINE HYDROCHLORIDE 0.1 MG: 0.1 TABLET ORAL at 08:34

## 2019-07-18 RX ADMIN — LIDOCAINE 1 PATCH: 560 PATCH PERCUTANEOUS; TOPICAL; TRANSDERMAL at 19:30

## 2019-07-18 ASSESSMENT — MIFFLIN-ST. JEOR: SCORE: 928.93

## 2019-07-18 NOTE — PROGRESS NOTES
[facility]  Hospitalist Progress Note  Amanda Kenyon MD 07/18/19  Text Page  Pager: [phone] (7am-6pm)    Reason for Stay (Diagnosis): AYAKA         Assessment and Plan:      Summary of Stay: Daya Ignacio is a 88 year old female with past medical history of hypertension, chronic anemia, lumbar compression fractures with chronic back pain, multiple hospitalizations for recent ESBL UTIs as well as C. difficile and recent likely diverticular GI bleed and recent ER visit with back pain/suprapubic pain (started on Bactrim for possible UTI) who was admitted on 7/13/2019 with generalized weakness and was found to have acute kidney injury as well as urinary retention (retention now resolved).  Renal function has continued to improve.  Today having significant nausea.    Problem List/Assessment and Plan:     AYAKA:  Baseline creatinine approximately 0.5.  Creatinine was 2.8 upon admission and peaked at 4.07.  This is likely due to medications and potentially dehydration as well as urinary retention (which subsequently resolved).  She had been prescribed Bactrim for possible UTI, increased her ibuprofen use over the past several days PTA and was on lisinopril.  Nephrology followed throughout admission.  She is now off IVF and creatinine continues to improve, today 1.89.  -Will need repeat BMP within one week with PCP  -Lisinopril discontinued (should not be resumed until creatinine has normalized)  -No NSAIDs  -Nephrology consulted, appreciate recommendations     Nausea: Patient developed significant nausea at this morning.  She was able to eat breakfast but the nausea seemed worse after this.  She denies abdominal pain.  She does not feel comfortable going home with the new nausea.  -Antiemetics as needed     Possible mild L2 compression fx, age undetermined, chronic back pain: Has had some increase in her chronic low back pain more recently.  Has had compression fractures in the past.  X-ray showing  decreased height of the L2 vertebral body so cannot rule out a mild compression fracture although age indeterminate.  She did increase the amount of ibuprofen she had been taking prior to admission.  She is on rather high doses of gabapentin at baseline with 800 mg twice daily and 1600 mg at bedtime.  -Stop NSAIDs and recommended she use acetaminophen instead  -Add low dose Oxycodone (2.5 mg) for pain  -Based on mild degree even if acute unlikely to , had a brace in the past which she did not tolerate  -Today resume gabapentin 200 mg twice daily, the dose can be increased as renal function normalizes     Recent ESBL UTIs: Recent ESBL E. coli UTI infections.  Was even hospitalized here for 10 days and received meropenem.  Most recent culture from 7/4 was positive for the E. coli ESBL and also Klebsiella variicola.  Had some suprapubic pressure and urinalysis on 7/11 in the ED showed 20 WBCs so she was given Bactrim prescription based on previous sensitivities.  Urine culture did not grow anything and this admission no evidence of UTI.     Recent Cdif: Recent C. difficile infection treated with oral vancomycin which she just finished a course of.  Diarrhea completely resolved.  No abdominal pain.     Physical deconditioning: Patient overall deconditioned from her multiple recent hospitalizations due to UTIs and C. difficile.  Does have physical therapy at home which will be resumed at discharge.     HTN: PTA on multiple medications including amlodipine 10 mg daily, clonidine 0.1 mg twice daily, lisinopril 40 g daily, metoprolol succinate 100 mg daily and Isordil 10 mg 3 times daily.    -Resume amlodipine, clonidine, metoprolol, Isordil  -Hold lisinopril for acute kidney injury     Anemia recent GI bleed: Hemoglobin is 11.3 similar to the past few days.  Was significantly lower in the setting of a lower GI bleed recently.  This is likely diverticular or related to her C. difficile infection.  Has been  "taking ibuprofen.  Labs consistent with iron deficiency and borderline B12 deficiency.       Anxiety: resume PTA sertraline 100 mg daily.    Diet: Renal Diet (non-dialysis)  Diet    DVT Prophylaxis: Low Risk/Ambulatory with no VTE prophylaxis indicated  Almanza Catheter: not present  Code Status: Full Code      Disposition Plan   Expected discharge: Tomorrow, recommended to prior living arrangement once nausea resolves.  Entered: Amanda Kenyon MD 07/18/2019, 9:56 AM       The patient's care was discussed with the Bedside Nurse, Care Coordinator/, Patient and Patient's Family.    Amanda Kenyon MD  Hospitalist Service  [facility]          Interval History (Subjective):      Patient states she is feeling very nauseated this morning.  She tried to eat breakfast and was able to do this but her nausea became worse.  She has not had emesis.  Denies abdominal pain.  No chest pain or shortness of breath.    I spoke with patient's son Gael, by phone.  He states that his mom sounds worse today compared to yesterday.  All of his questions were answered.                  Physical Exam:      Last Vital Signs:  /69 (BP Location: Right arm)   Pulse 65   Temp 96.5  F (35.8  C) (Oral)   Resp 18   Ht 1.549 m (5' 1\")   Wt 56.2 kg (123 lb 12.8 oz)   SpO2 99%   BMI 23.39 kg/m      General: Alert, awake, appears slightly uncomfortable due to nausea  HEENT: Normocephalic and atraumatic, eyes anicteric and without scleral injection, EOMI, face symmetric, MMM.  Cardiac: RRR, normal S1, S2. No m/g/r, no LE edema.  Pulmonary: Normal chest rise, normal work of breathing.  Lungs CTAB without crackles or wheezing.  Abdomen: soft, non-tender, non-distended.  Normoactive bowel sounds, no guarding or rebound tenderness.  Extremities: no deformities.  Warm, well perfused.  Skin: no rashes or lesions.  Warm and Dry.  Neuro: No focal deficits.  Speech clear.  Coordination and strength grossly normal.  Psych: " Alert and oriented x3. Appropriate affect.         Medications:      All current medications were reviewed with changes reflected in problem list.         Data:      All new lab and imaging data was reviewed.   Labs:  Recent Labs   Lab 07/18/19  0631 07/17/19  0710 07/16/19  0525    139 141   POTASSIUM 3.8 4.3 4.5   CHLORIDE 107 111* 114*   CO2 27 24 21   ANIONGAP 4 4 6   GLC 93 92 86   BUN 34* 34* 39*   CR 1.89* 2.80* 3.67*   GFRESTIMATED 23* 14* 10*   GFRESTBLACK 27* 17* 12*   DAVE 8.3* 8.6 8.2*     Recent Labs   Lab 07/17/19  0710 07/16/19  0525 07/15/19  0641   WBC 4.3 3.6* 5.4   HGB 9.7* 8.2* 8.8*   HCT 30.5* 26.2* 28.2*   MCV 97 99 99    120* 126*      Imaging:   No results found for this or any previous visit (from the past 24 hour(s)).    Amanda Kenyon MD.

## 2019-07-18 NOTE — PLAN OF CARE
Vitals are Temp: 96.3  F (35.7  C) Temp src: Oral BP: 154/62 Pulse: 65 Heart Rate: 73 Resp: 16 SpO2: 90 %.  Patient is Alert and Oriented x4. They are SBA with Gait Belt and Walker.  Patient is on Contact precuations for ESBL.  Pt is a Renal diet.  They are complaining of 10/10 pain in their lower back.  Oxycodone and lidocaine given for pain.  Patient is complaining of her pain being worse tonight then it has been the past few days.  Patient is Saline locked.  Plan of care is for patient to discharge in the morning.

## 2019-07-18 NOTE — PLAN OF CARE
Assumed care at 3am.  No issues overnight.  No complaints of pain.  Plan to discharge back to the [facility] today.

## 2019-07-18 NOTE — PLAN OF CARE
"Pt alert and oriented x4. She was having some back pain this AM - given low dose oxy. Has been complaining of nausea all shift, no emesis. Given zofran, compazine and reglan - has not helped too much. Given 500ml NS bolus. Up SBA w/ walker and GB. SL. Renal diet. Has been up walking today. She will stay tonight for further monitoring.     /58 (BP Location: Left arm)   Pulse 65   Temp 97  F (36.1  C) (Oral)   Resp 16   Ht 1.549 m (5' 1\")   Wt 56.2 kg (123 lb 12.8 oz)   SpO2 95%   BMI 23.39 kg/m      "

## 2019-07-18 NOTE — PROGRESS NOTES
Ok for discharge per IM. Suggest follow up labs in about 1 week with her primary MD.   Hold off on restarting ACEI until Cr back to baseline. No NSAID.    We will sign off.

## 2019-07-18 NOTE — PLAN OF CARE
11 PM- 3:40 AM- No acute events. Pt A&O. Lungs clear. Up with one & walker. SL. Plan to discharge to previous living situation this AM. Oxy given for chronic back pain.

## 2019-07-18 NOTE — DISCHARGE INSTRUCTIONS
1.  Because your kidney function still remains elevated your gabapentin dose needs to be decreased.  You can take 200 mg twice daily.  Once your renal function returns to normal you can eventually return to your gabapentin dose you were on prior to this hospitalization.  2.  You should not take lisinopril until your kidney function has completely normalized and you are told to resume this by your doctor.  3.  Do not take NSAIDs as this will worsen your kidney function.

## 2019-07-19 VITALS
BODY MASS INDEX: 22.98 KG/M2 | RESPIRATION RATE: 20 BRPM | WEIGHT: 121.7 LBS | OXYGEN SATURATION: 96 % | DIASTOLIC BLOOD PRESSURE: 71 MMHG | HEIGHT: 61 IN | TEMPERATURE: 96.1 F | HEART RATE: 76 BPM | SYSTOLIC BLOOD PRESSURE: 186 MMHG

## 2019-07-19 LAB
ANION GAP SERPL CALCULATED.3IONS-SCNC: 6 MMOL/L (ref 3–14)
BUN SERPL-MCNC: 27 MG/DL (ref 7–30)
CALCIUM SERPL-MCNC: 8.4 MG/DL (ref 8.5–10.1)
CHLORIDE SERPL-SCNC: 107 MMOL/L (ref 94–109)
CO2 SERPL-SCNC: 28 MMOL/L (ref 20–32)
CREAT SERPL-MCNC: 1.29 MG/DL (ref 0.52–1.04)
GFR SERPL CREATININE-BSD FRML MDRD: 37 ML/MIN/{1.73_M2}
GLUCOSE SERPL-MCNC: 98 MG/DL (ref 70–99)
POTASSIUM SERPL-SCNC: 3.5 MMOL/L (ref 3.4–5.3)
SODIUM SERPL-SCNC: 141 MMOL/L (ref 133–144)

## 2019-07-19 PROCEDURE — 25000132 ZZH RX MED GY IP 250 OP 250 PS 637: Mod: GY | Performed by: INTERNAL MEDICINE

## 2019-07-19 PROCEDURE — 36415 COLL VENOUS BLD VENIPUNCTURE: CPT | Performed by: INTERNAL MEDICINE

## 2019-07-19 PROCEDURE — 99239 HOSP IP/OBS DSCHRG MGMT >30: CPT | Performed by: INTERNAL MEDICINE

## 2019-07-19 PROCEDURE — 80048 BASIC METABOLIC PNL TOTAL CA: CPT | Performed by: INTERNAL MEDICINE

## 2019-07-19 RX ADMIN — AMLODIPINE BESYLATE 10 MG: 10 TABLET ORAL at 09:29

## 2019-07-19 RX ADMIN — Medication 2.5 MG: at 09:38

## 2019-07-19 RX ADMIN — SERTRALINE HYDROCHLORIDE 100 MG: 100 TABLET ORAL at 09:28

## 2019-07-19 RX ADMIN — CLONIDINE HYDROCHLORIDE 0.1 MG: 0.1 TABLET ORAL at 09:29

## 2019-07-19 RX ADMIN — METOPROLOL SUCCINATE 50 MG: 50 TABLET, EXTENDED RELEASE ORAL at 09:29

## 2019-07-19 RX ADMIN — GABAPENTIN 200 MG: 100 CAPSULE ORAL at 09:29

## 2019-07-19 RX ADMIN — ACETAMINOPHEN 650 MG: 325 TABLET, FILM COATED ORAL at 09:38

## 2019-07-19 ASSESSMENT — MIFFLIN-ST. JEOR: SCORE: 919.41

## 2019-07-19 NOTE — PLAN OF CARE
PRIMARY DIAGNOSIS: GENERALIZED WEAKNESS    OUTPATIENT/OBSERVATION GOALS TO BE MET BEFORE DISCHARGE  1. Orthostatic performed: No    2. Tolerating PO medications: Yes    3. Return to near baseline physical activity: Yes    4. Cleared for discharge by consultants (if involved): N/A    Discharge Planner Nurse   Safe discharge environment identified: Yes  Barriers to discharge: No       Entered by: Delmy Yañez 07/19/2019 1:37 AM     Please review provider order for any additional goals.   Nurse to notify provider when observation goals have been met and patient is ready for discharge.    Pt alert and oriented, up with standby assist and the walker. Pt states still has some back pain but better. Nausea better at this time.

## 2019-07-19 NOTE — PROGRESS NOTES
Transition Communication Hand-off for Care Transitions to Next Level of Care Provider    Name: Daya Ignacio  : 10/11/1930  MRN #: [id number]  Primary Care Provider: Marcy Soares  Primary Care MD Name: Marcy Soares PA-C  Primary Clinic: [address] [facility] [address]  Primary Care Clinic Name: [facility]  Reason for Hospitalization:  Syncope and collapse [R55]  Acute renal failure, unspecified acute renal failure type (H) [N17.9]  Midline low back pain without sciatica, unspecified chronicity [M54.5]  Admit Date/Time: 2019 12:53 PM  Discharge Date: 19  Payor Source: Payor: MEDICARE / Plan: MEDICARE / Product Type: Medicare /     Readmission Assessment Measure (EMI) Risk Score/category: High           Reason for Communication Hand-off Referral: Fragility    Discharge Plan:  Home with home care     Concern for non-adherence with plan of care:   No  Discharge Needs Assessment:  Needs      Most Recent Value   Interventions provided  community resources including [employer] infor   # of Referrals Placed by [facility]  -- [Discussed with ELLY Kenyon - Home Health Referral if d/c to home]          Already enrolled in Tele-monitoring program and name of program:  NA  Follow-up specialty is recommended: No    Follow-up plan:    Future Appointments   Date Time Provider Department Center   2019  2:15 PM Marcy Soares PA-C [facility]       Any outstanding tests or procedures:        Referrals     Future Labs/Procedures    Home care nursing referral     Comments:    RN skilled nursing visit. Resume prior home care orders    Your provider has ordered home care nursing services. If you have not been contacted within 2 days of your discharge please call the inpatient department phone number at [phone] .    Home Care PT Referral for Hospital Discharge     Comments:    PT to eval and treat    Your provider has ordered home care - physical therapy. If you have not been contacted within 2  days of your discharge please call the department phone number listed on the top of this document.              Key Recommendations:  CTS identifies patient as high risk for Elevated EMI. Patient a readmit, She has x4 previous admission and 2 ER visits in the last 3 months. This is her 3rd admission for UTI.  Patient admitted 7/4-7/7 for weakness and UTI d/t klebsiella and ESBL E coli, 6/22-6/23 admit for acute on chronic back pain. Patient discharged home with [facility] and family support, [facility] was not well established d/t patient being readmitted to the hospital. Patient admitted 6/10-6/16 for c-diff colitis and 5/26-5/31 for ESBL UTI. She has a medical h/o HTN, HLD, fall with prev vertebral fx, chronic hip pain treated with neurontin, anemia recent GI bleed and anxiety.     Patient living alone in [facility] assisted living facility.She uses a cane and RW to ambulate. She is independent in medication administration, cooking and ADL's. Patient pays a  1x/mo. Her son is supportive, provides transportation and checks in on her often. Patient also has support from her neighbors.      Spoke with son, Gael. Son feels patient hasn't been to her baseline since around Memorial day when she was initially diagnosed with UTI. [facility] wasn't able to see her much. [facility] discussed and give resources for [place] [employer], Care giver Assurance and Senior Linkage line. Encouraged getting patient set up with [employer]    Recommendations are for patient to follow up in clinic for a BMP lab check in 1 week and to avoid NSAID's. Patient will need medication follow up as lisinopril is on hold and Neurontin dosing was decreased until renal function improves.   Patient discharged home with home care.     Christina Watts & Isabel Rowland    AVS/Discharge Summary is the source of truth; this is a helpful guide for improved communication of patient story

## 2019-07-19 NOTE — PLAN OF CARE
PRIMARY DIAGNOSIS: UTI/ AYAKA  OUTPATIENT/OBSERVATION GOALS TO BE MET BEFORE DISCHARGE:  Diagnostic test and consults (if applicable) complete: Yes    Vitals signs stable or return to baseline: Yes    Tolerate oral intake to maintain hydration: Yes    Pain status: Pain free.    Tolerating oral antibiotics or has plans for home infusion setup:  Yes    Return to near baseline physical activity: Yes    Discharge Planner Nurse   Safe discharge environment identified: Yes  Barriers to discharge: No       Entered by: Enma Shearer 07/19/2019 12:50 PM     Please review provider order for any additional goals.   Nurse to notify provider when observation goals have been met and patient is ready for discharge.      Patient ok to discharge to home. Reviewed all discharge instructions and medications with patient and daughter-in-law. Gave new gabapentin and oxycodone scripts, filled. Discussed reasons to notify MD and follow up times. No furthe questions at this time.   Of note, patient had a small reddened area on left forearm, ? Previous IV infiltration?. Discussed alternating warm and cold packs.

## 2019-07-19 NOTE — PLAN OF CARE
"      Blood pressure 177/61, pulse 72, temperature 95.6  F (35.3  C), temperature source Oral, resp. rate 16, height 1.549 m (5' 1\"), weight 56.2 kg (123 lb 12.8 oz), SpO2 95 %, not currently breastfeeding.    VSS; however BP elevated.  Regularly scheduled Catapress given.  LS clear, adequate sats on room air.   Compazine given for c/o of nausea; no emesis.   Patient taking 50 to 75% of  Evening meal tray.  Patient later stating that hr nausea has improved.  Lidocaine patch applied to left lower back.  Patient is up with standby assist of one/walker to bathroom.  Plan:   Continue to provide supportive cares.  Plan discharge tomorrow.    "

## 2019-07-22 ENCOUNTER — PATIENT OUTREACH (OUTPATIENT)
Dept: CARE COORDINATION | Facility: CLINIC | Age: 84
End: 2019-07-22

## 2019-07-22 ENCOUNTER — HOSPITAL ENCOUNTER (OUTPATIENT)
Facility: CLINIC | Age: 84
Setting detail: OBSERVATION
Discharge: HOME | End: 2019-07-23
Attending: PHYSICIAN ASSISTANT | Admitting: HOSPITALIST
Payer: MEDICARE

## 2019-07-22 ENCOUNTER — DOCUMENTATION ONLY (OUTPATIENT)
Dept: CARE COORDINATION | Facility: CLINIC | Age: 84
End: 2019-07-22

## 2019-07-22 DIAGNOSIS — E87.6 HYPOKALEMIA: ICD-10-CM

## 2019-07-22 DIAGNOSIS — N30.00 ACUTE CYSTITIS WITHOUT HEMATURIA: ICD-10-CM

## 2019-07-22 DIAGNOSIS — E83.42 HYPOMAGNESEMIA: ICD-10-CM

## 2019-07-22 DIAGNOSIS — M62.81 GENERALIZED MUSCLE WEAKNESS: ICD-10-CM

## 2019-07-22 DIAGNOSIS — R11.0 NAUSEA: ICD-10-CM

## 2019-07-22 LAB
ALBUMIN SERPL-MCNC: 3.6 G/DL (ref 3.4–5)
ALBUMIN UR-MCNC: 20 MG/DL
ALP SERPL-CCNC: 83 U/L (ref 40–150)
ALT SERPL W P-5'-P-CCNC: 16 U/L (ref 0–50)
ANION GAP SERPL CALCULATED.3IONS-SCNC: 6 MMOL/L (ref 3–14)
APPEARANCE UR: ABNORMAL
AST SERPL W P-5'-P-CCNC: 13 U/L (ref 0–45)
BACTERIA #/AREA URNS HPF: ABNORMAL /HPF
BASOPHILS # BLD AUTO: 0 10E9/L (ref 0–0.2)
BASOPHILS NFR BLD AUTO: 0.5 %
BILIRUB SERPL-MCNC: 0.8 MG/DL (ref 0.2–1.3)
BILIRUB UR QL STRIP: NEGATIVE
BUN SERPL-MCNC: 17 MG/DL (ref 7–30)
CALCIUM SERPL-MCNC: 8.7 MG/DL (ref 8.5–10.1)
CHLORIDE SERPL-SCNC: 102 MMOL/L (ref 94–109)
CO2 SERPL-SCNC: 30 MMOL/L (ref 20–32)
COLOR UR AUTO: ABNORMAL
CREAT SERPL-MCNC: 0.84 MG/DL (ref 0.52–1.04)
DIFFERENTIAL METHOD BLD: ABNORMAL
EOSINOPHIL # BLD AUTO: 0.1 10E9/L (ref 0–0.7)
EOSINOPHIL NFR BLD AUTO: 1.2 %
ERYTHROCYTE [DISTWIDTH] IN BLOOD BY AUTOMATED COUNT: 14.6 % (ref 10–15)
GFR SERPL CREATININE-BSD FRML MDRD: 61 ML/MIN/{1.73_M2}
GLUCOSE SERPL-MCNC: 136 MG/DL (ref 70–99)
GLUCOSE UR STRIP-MCNC: NEGATIVE MG/DL
HCT VFR BLD AUTO: 30.3 % (ref 35–47)
HGB BLD-MCNC: 10 G/DL (ref 11.7–15.7)
HGB UR QL STRIP: NEGATIVE
IMM GRANULOCYTES # BLD: 0 10E9/L (ref 0–0.4)
IMM GRANULOCYTES NFR BLD: 0.7 %
INTERPRETATION ECG - MUSE: NORMAL
KETONES UR STRIP-MCNC: NEGATIVE MG/DL
LEUKOCYTE ESTERASE UR QL STRIP: ABNORMAL
LYMPHOCYTES # BLD AUTO: 0.8 10E9/L (ref 0.8–5.3)
LYMPHOCYTES NFR BLD AUTO: 13.5 %
MAGNESIUM SERPL-MCNC: 1.2 MG/DL (ref 1.6–2.3)
MAGNESIUM SERPL-MCNC: 3.2 MG/DL (ref 1.6–2.3)
MCH RBC QN AUTO: 30.5 PG (ref 26.5–33)
MCHC RBC AUTO-ENTMCNC: 33 G/DL (ref 31.5–36.5)
MCV RBC AUTO: 92 FL (ref 78–100)
MONOCYTES # BLD AUTO: 0.4 10E9/L (ref 0–1.3)
MONOCYTES NFR BLD AUTO: 7.3 %
NEUTROPHILS # BLD AUTO: 4.4 10E9/L (ref 1.6–8.3)
NEUTROPHILS NFR BLD AUTO: 76.8 %
NITRATE UR QL: POSITIVE
NRBC # BLD AUTO: 0 10*3/UL
NRBC BLD AUTO-RTO: 0 /100
PH UR STRIP: 7 PH (ref 5–7)
PLATELET # BLD AUTO: 162 10E9/L (ref 150–450)
POTASSIUM SERPL-SCNC: 2.9 MMOL/L (ref 3.4–5.3)
POTASSIUM SERPL-SCNC: 3.3 MMOL/L (ref 3.4–5.3)
PROT SERPL-MCNC: 7 G/DL (ref 6.8–8.8)
RBC # BLD AUTO: 3.28 10E12/L (ref 3.8–5.2)
RBC #/AREA URNS AUTO: 2 /HPF (ref 0–2)
SODIUM SERPL-SCNC: 138 MMOL/L (ref 133–144)
SOURCE: ABNORMAL
SP GR UR STRIP: 1.01 (ref 1–1.03)
TROPONIN I SERPL-MCNC: <0.015 UG/L (ref 0–0.04)
UROBILINOGEN UR STRIP-MCNC: NORMAL MG/DL (ref 0–2)
WBC # BLD AUTO: 5.7 10E9/L (ref 4–11)
WBC #/AREA URNS AUTO: 31 /HPF (ref 0–5)

## 2019-07-22 PROCEDURE — 84132 ASSAY OF SERUM POTASSIUM: CPT | Mod: 91 | Performed by: PHYSICIAN ASSISTANT

## 2019-07-22 PROCEDURE — 25000132 ZZH RX MED GY IP 250 OP 250 PS 637: Mod: GY | Performed by: PHYSICIAN ASSISTANT

## 2019-07-22 PROCEDURE — 80053 COMPREHEN METABOLIC PANEL: CPT | Performed by: PHYSICIAN ASSISTANT

## 2019-07-22 PROCEDURE — G0378 HOSPITAL OBSERVATION PER HR: HCPCS

## 2019-07-22 PROCEDURE — 81001 URINALYSIS AUTO W/SCOPE: CPT | Performed by: PHYSICIAN ASSISTANT

## 2019-07-22 PROCEDURE — 83735 ASSAY OF MAGNESIUM: CPT | Performed by: PHYSICIAN ASSISTANT

## 2019-07-22 PROCEDURE — 25000128 H RX IP 250 OP 636: Performed by: PHYSICIAN ASSISTANT

## 2019-07-22 PROCEDURE — 99285 EMERGENCY DEPT VISIT HI MDM: CPT | Mod: 25

## 2019-07-22 PROCEDURE — 96376 TX/PRO/DX INJ SAME DRUG ADON: CPT

## 2019-07-22 PROCEDURE — 87186 SC STD MICRODIL/AGAR DIL: CPT | Performed by: PHYSICIAN ASSISTANT

## 2019-07-22 PROCEDURE — 96366 THER/PROPH/DIAG IV INF ADDON: CPT

## 2019-07-22 PROCEDURE — 96375 TX/PRO/DX INJ NEW DRUG ADDON: CPT

## 2019-07-22 PROCEDURE — 87086 URINE CULTURE/COLONY COUNT: CPT | Performed by: PHYSICIAN ASSISTANT

## 2019-07-22 PROCEDURE — 99220 ZZC INITIAL OBSERVATION CARE,LEVL III: CPT | Performed by: PHYSICIAN ASSISTANT

## 2019-07-22 PROCEDURE — 87088 URINE BACTERIA CULTURE: CPT | Performed by: PHYSICIAN ASSISTANT

## 2019-07-22 PROCEDURE — 84484 ASSAY OF TROPONIN QUANT: CPT | Performed by: PHYSICIAN ASSISTANT

## 2019-07-22 PROCEDURE — 96365 THER/PROPH/DIAG IV INF INIT: CPT

## 2019-07-22 PROCEDURE — 85025 COMPLETE CBC W/AUTO DIFF WBC: CPT | Performed by: PHYSICIAN ASSISTANT

## 2019-07-22 PROCEDURE — 93005 ELECTROCARDIOGRAM TRACING: CPT

## 2019-07-22 RX ORDER — PROCHLORPERAZINE MALEATE 5 MG
5 TABLET ORAL EVERY 6 HOURS PRN
Status: DISCONTINUED | OUTPATIENT
Start: 2019-07-22 | End: 2019-07-23 | Stop reason: HOSPADM

## 2019-07-22 RX ORDER — LIDOCAINE 40 MG/G
CREAM TOPICAL
Status: DISCONTINUED | OUTPATIENT
Start: 2019-07-22 | End: 2019-07-23 | Stop reason: HOSPADM

## 2019-07-22 RX ORDER — METOPROLOL SUCCINATE 100 MG/1
100 TABLET, EXTENDED RELEASE ORAL DAILY
Status: DISCONTINUED | OUTPATIENT
Start: 2019-07-22 | End: 2019-07-23 | Stop reason: HOSPADM

## 2019-07-22 RX ORDER — GRANULES FOR ORAL 3 G/1
3 POWDER ORAL ONCE
Status: COMPLETED | OUTPATIENT
Start: 2019-07-22 | End: 2019-07-22

## 2019-07-22 RX ORDER — ISOSORBIDE DINITRATE 10 MG/1
10 TABLET ORAL 3 TIMES DAILY
Status: DISCONTINUED | OUTPATIENT
Start: 2019-07-22 | End: 2019-07-23 | Stop reason: HOSPADM

## 2019-07-22 RX ORDER — ACETAMINOPHEN 500 MG
1000 TABLET ORAL EVERY 8 HOURS
Status: DISCONTINUED | OUTPATIENT
Start: 2019-07-22 | End: 2019-07-23 | Stop reason: HOSPADM

## 2019-07-22 RX ORDER — GABAPENTIN 100 MG/1
200 CAPSULE ORAL 2 TIMES DAILY
Status: DISCONTINUED | OUTPATIENT
Start: 2019-07-22 | End: 2019-07-23 | Stop reason: HOSPADM

## 2019-07-22 RX ORDER — AMLODIPINE BESYLATE 10 MG/1
10 TABLET ORAL DAILY
Status: DISCONTINUED | OUTPATIENT
Start: 2019-07-23 | End: 2019-07-23 | Stop reason: HOSPADM

## 2019-07-22 RX ORDER — POLYETHYLENE GLYCOL 3350 17 G/17G
17 POWDER, FOR SOLUTION ORAL DAILY PRN
Status: DISCONTINUED | OUTPATIENT
Start: 2019-07-22 | End: 2019-07-23 | Stop reason: HOSPADM

## 2019-07-22 RX ORDER — ONDANSETRON 2 MG/ML
4 INJECTION INTRAMUSCULAR; INTRAVENOUS EVERY 6 HOURS PRN
Status: DISCONTINUED | OUTPATIENT
Start: 2019-07-22 | End: 2019-07-23 | Stop reason: HOSPADM

## 2019-07-22 RX ORDER — NALOXONE HYDROCHLORIDE 0.4 MG/ML
.1-.4 INJECTION, SOLUTION INTRAMUSCULAR; INTRAVENOUS; SUBCUTANEOUS
Status: DISCONTINUED | OUTPATIENT
Start: 2019-07-22 | End: 2019-07-23 | Stop reason: HOSPADM

## 2019-07-22 RX ORDER — POTASSIUM CHLORIDE 1500 MG/1
20-40 TABLET, EXTENDED RELEASE ORAL
Status: DISCONTINUED | OUTPATIENT
Start: 2019-07-22 | End: 2019-07-23 | Stop reason: HOSPADM

## 2019-07-22 RX ORDER — ONDANSETRON 4 MG/1
4 TABLET, ORALLY DISINTEGRATING ORAL EVERY 6 HOURS PRN
Status: DISCONTINUED | OUTPATIENT
Start: 2019-07-22 | End: 2019-07-23 | Stop reason: HOSPADM

## 2019-07-22 RX ORDER — AMLODIPINE BESYLATE 5 MG/1
10 TABLET ORAL ONCE
Status: COMPLETED | OUTPATIENT
Start: 2019-07-22 | End: 2019-07-22

## 2019-07-22 RX ORDER — PROCHLORPERAZINE 25 MG
12.5 SUPPOSITORY, RECTAL RECTAL EVERY 12 HOURS PRN
Status: DISCONTINUED | OUTPATIENT
Start: 2019-07-22 | End: 2019-07-23 | Stop reason: HOSPADM

## 2019-07-22 RX ORDER — SENNOSIDES 8.6 MG
1 TABLET ORAL 2 TIMES DAILY PRN
Status: DISCONTINUED | OUTPATIENT
Start: 2019-07-22 | End: 2019-07-23 | Stop reason: HOSPADM

## 2019-07-22 RX ORDER — SERTRALINE HYDROCHLORIDE 100 MG/1
100 TABLET, FILM COATED ORAL DAILY
Status: DISCONTINUED | OUTPATIENT
Start: 2019-07-22 | End: 2019-07-23 | Stop reason: HOSPADM

## 2019-07-22 RX ORDER — MAGNESIUM SULFATE HEPTAHYDRATE 40 MG/ML
4 INJECTION, SOLUTION INTRAVENOUS EVERY 4 HOURS PRN
Status: DISCONTINUED | OUTPATIENT
Start: 2019-07-22 | End: 2019-07-22

## 2019-07-22 RX ORDER — POTASSIUM CL/LIDO/0.9 % NACL 10MEQ/0.1L
10 INTRAVENOUS SOLUTION, PIGGYBACK (ML) INTRAVENOUS
Status: DISCONTINUED | OUTPATIENT
Start: 2019-07-22 | End: 2019-07-23 | Stop reason: HOSPADM

## 2019-07-22 RX ORDER — POTASSIUM CHLORIDE 1.5 G/1.58G
20-40 POWDER, FOR SOLUTION ORAL
Status: DISCONTINUED | OUTPATIENT
Start: 2019-07-22 | End: 2019-07-23 | Stop reason: HOSPADM

## 2019-07-22 RX ORDER — POTASSIUM CHLORIDE 1.5 G/1.58G
60 POWDER, FOR SOLUTION ORAL ONCE
Status: COMPLETED | OUTPATIENT
Start: 2019-07-22 | End: 2019-07-22

## 2019-07-22 RX ORDER — ONDANSETRON 2 MG/ML
4 INJECTION INTRAMUSCULAR; INTRAVENOUS ONCE
Status: COMPLETED | OUTPATIENT
Start: 2019-07-22 | End: 2019-07-22

## 2019-07-22 RX ORDER — CLONIDINE HYDROCHLORIDE 0.1 MG/1
0.1 TABLET ORAL 2 TIMES DAILY
Status: DISCONTINUED | OUTPATIENT
Start: 2019-07-22 | End: 2019-07-23 | Stop reason: HOSPADM

## 2019-07-22 RX ORDER — POTASSIUM CHLORIDE 29.8 MG/ML
20 INJECTION INTRAVENOUS
Status: DISCONTINUED | OUTPATIENT
Start: 2019-07-22 | End: 2019-07-22

## 2019-07-22 RX ORDER — POTASSIUM CHLORIDE 7.45 MG/ML
10 INJECTION INTRAVENOUS
Status: DISCONTINUED | OUTPATIENT
Start: 2019-07-22 | End: 2019-07-23 | Stop reason: HOSPADM

## 2019-07-22 RX ADMIN — CLONIDINE HYDROCHLORIDE 0.1 MG: 0.1 TABLET ORAL at 19:57

## 2019-07-22 RX ADMIN — MAGNESIUM SULFATE HEPTAHYDRATE 4 G: 40 INJECTION, SOLUTION INTRAVENOUS at 11:59

## 2019-07-22 RX ADMIN — SERTRALINE HYDROCHLORIDE 100 MG: 100 TABLET ORAL at 18:02

## 2019-07-22 RX ADMIN — POTASSIUM CHLORIDE 60 MEQ: 1.5 POWDER, FOR SOLUTION ORAL at 11:05

## 2019-07-22 RX ADMIN — METOPROLOL SUCCINATE 100 MG: 100 TABLET, EXTENDED RELEASE ORAL at 18:02

## 2019-07-22 RX ADMIN — ONDANSETRON HYDROCHLORIDE 4 MG: 2 INJECTION, SOLUTION INTRAMUSCULAR; INTRAVENOUS at 14:53

## 2019-07-22 RX ADMIN — ACETAMINOPHEN 1000 MG: 500 TABLET, FILM COATED ORAL at 18:02

## 2019-07-22 RX ADMIN — POTASSIUM CHLORIDE 20 MEQ: 20 TABLET, EXTENDED RELEASE ORAL at 20:02

## 2019-07-22 RX ADMIN — ONDANSETRON HYDROCHLORIDE 4 MG: 2 INJECTION, SOLUTION INTRAMUSCULAR; INTRAVENOUS at 10:14

## 2019-07-22 RX ADMIN — AMLODIPINE BESYLATE 10 MG: 5 TABLET ORAL at 16:07

## 2019-07-22 RX ADMIN — GABAPENTIN 200 MG: 100 CAPSULE ORAL at 19:57

## 2019-07-22 RX ADMIN — POTASSIUM CHLORIDE 40 MEQ: 20 TABLET, EXTENDED RELEASE ORAL at 18:05

## 2019-07-22 RX ADMIN — OXYCODONE HYDROCHLORIDE 2.5 MG: 5 TABLET ORAL at 18:02

## 2019-07-22 RX ADMIN — ISOSORBIDE DINITRATE 10 MG: 10 TABLET ORAL at 19:56

## 2019-07-22 RX ADMIN — FOSFOMYCIN TROMETHAMINE 3 G: 3 POWDER ORAL at 13:55

## 2019-07-22 ASSESSMENT — MIFFLIN-ST. JEOR: SCORE: 912.15

## 2019-07-22 ASSESSMENT — ENCOUNTER SYMPTOMS
CHILLS: 0
VOMITING: 0
DYSURIA: 0
FATIGUE: 1
FEVER: 0
NAUSEA: 1
CONSTIPATION: 0
SHORTNESS OF BREATH: 0
HEADACHES: 0
WEAKNESS: 1
DIARRHEA: 0
BACK PAIN: 1

## 2019-07-22 NOTE — PROGRESS NOTES
Clinic Care Coordination Contact    Situation: Patient chart reviewed by care coordinator.    Background: Care coordination following patient to reduce number of ED visits. Patient resides at [facility] and receiving home care services.     Patient was hospitalized at [facility] from 07/13 - 07/19 with a diagnosis of syncope and collapse, Acute renal failure, midline low back pain without sciatica, unspecified chronicity.     Refer to CTS handoff for additional details.     Assessment: Patient has returned to [facility] and is currently in the ED with diagnosis of nausea and generalized weakness.     Plan/Recommendations: RNCC will monitor chart closely for discharge and complete outreach within 1-2 business days of discharge.     Elizabeth Simmons RN Care Coordinator  [facility]  Email: [email or web address]  Phone: [phone]

## 2019-07-22 NOTE — ED NOTES
Pt's son, Gael, updated about her staying over night. Gael very understanding and agreeable to plan. [phone]

## 2019-07-22 NOTE — ED NOTES
Bed: ED28  Expected date: 7/22/19  Expected time: 9:42 AM  Means of arrival: Ambulance  Comments:  Anju Mayfield-

## 2019-07-22 NOTE — PROGRESS NOTES
[facility] now requests orders and shares plan of care/discharge summaries for some patients through [employer].  Please REPLY TO THIS MESSAGE OR ROUTE BACK TO THE AUTHOR in order to give authorization for orders when needed.  This is considered a verbal order, you will still receive a faxed copy of orders for signature.  Thank you for your assistance in improving collaboration for our patients.    ORDER    Requesting resumption of care orders.     Skilled Nursing 2 week x 1, 1 week x 1, 2 prn visits for medication mgmt, pain assessment, CP assessment, disease process education.   PT eval/treat for fall prevention and strengthening.     Thank you,   Isabel Cramer, RN  [phone]

## 2019-07-22 NOTE — ED NOTES
Provider aware of pt being hypertensive, pt asymptomatic denies chest pain. Provider states that pt recently stopped one of her blood pressure medications.

## 2019-07-22 NOTE — PLAN OF CARE
"PRIMARY DIAGNOSIS: \"GENERIC\" NURSING  OUTPATIENT/OBSERVATION GOALS TO BE MET BEFORE DISCHARGE:  ADLs back to baseline: Yes    Activity and level of assistance: Up with standby assistance. SBA walker    Pain status: Improved-controlled with oral pain medications. Chronic lower back pain. Using PO pain meds and patches at home.  Will continue.     Return to near baseline physical activity: Yes     Discharge Planner Nurse   Safe discharge environment identified: Yes  Barriers to discharge: No       Entered by: Isabel Ahmadi 07/22/2019 5:10PM     Please review provider order for any additional goals.   Nurse to notify provider when observation goals have been met and patient is ready for discharge.    Patient is A/oX4.  Denies Nausea and vomiting.  Tolerating regular diet. K+ 3.3; will be replacing. UC pending.  BP elevated, will monitor and give meds once pharmacy approves.   "

## 2019-07-22 NOTE — ED NOTES
Observation Brochure and Video   Patient informed of observation status based on provider's order. Observation Brochure was given and video watched.  Claudette Lopez RN

## 2019-07-22 NOTE — PHARMACY-ADMISSION MEDICATION HISTORY
Admission medication history interview status for this patient is complete. See [facility] admission navigator for allergy information, prior to admission medications and immunization status.     Medication history interview source(s):Patient  Medication history resources (including written lists, pill bottles, clinic record):None  Primary pharmacy: [facility] on [address] in [place]    Changes made to PTA medication list:  Added: None  Deleted: None  Changed: None    Actions taken by pharmacist (provider contacted, etc):None     Additional medication history information:None    Medication reconciliation/reorder completed by provider prior to medication history? No    Do you take OTC medications (eg tylenol, ibuprofen, fish oil, eye/ear drops, etc)? Yes (Y/N)    For patients on insulin therapy: No (Y/N)      Prior to Admission medications    Medication Sig Last Dose Taking? Auth Provider   acetaminophen (TYLENOL) 325 MG tablet Take 2 tablets (650 mg) by mouth every 4 hours as needed for mild pain 7/21/2019 at Unknown time Yes Juliano Mckeon MD   amLODIPine (NORVASC) 10 MG tablet Take 1 tablet (10 mg) by mouth daily 7/21/2019 at Unknown time Yes Marcy Soares PA-C   Calcium Carbonate (CALCIUM 600 PO) Take 1 tablet by mouth 2 times daily  7/21/2019 at Unknown time Yes Reported, Patient   Cholecalciferol (VITAMIN D3) 2000 UNITS CAPS Take 2,000 Units by mouth daily (with dinner) 7/21/2019 at Unknown time Yes Unknown, Entered By History   cloNIDine (CATAPRES) 0.1 MG tablet TAKE ONE TABLET BY MOUTH TWO TIMES A DAY 7/21/2019 at Unknown time Yes Marcy Soares PA-C   fish oil-omega-3 fatty acids 1000 MG capsule Take 1 g by mouth daily 7/21/2019 at Unknown time Yes Unknown, Entered By History   gabapentin (NEURONTIN) 100 MG capsule Take 2 capsules (200 mg) by mouth 2 times daily 7/21/2019 at Unknown time Yes Amanda Kenyon MD   isosorbide dinitrate (ISORDIL) 10 MG tablet Take 1 tablet (10 mg) by mouth 3  times daily 7/21/2019 at Unknown time Yes Marcy Soares PA-C   metoprolol succinate ER (TOPROL-XL) 100 MG 24 hr tablet Take 1 tablet (100 mg) by mouth daily 7/21/2019 at AM Yes Marcy Soares PA-C   Multiple Vitamins-Minerals (PRESERVISION AREDS) CAPS Take 1 capsule by mouth 2 times daily  7/21/2019 at Unknown time Yes Unknown, Entered By History   oxyCODONE (ROXICODONE) 5 MG tablet Take 0.5 tablets (2.5 mg) by mouth every 4 hours as needed for moderate to severe pain 7/21/2019 at Unknown time Yes Juliano Mckeon MD   senna (SENOKOT) 8.6 MG tablet Take 1 tablet by mouth 2 times daily as needed for constipation Past Month at Unknown time Yes Colin Rodas MD   sertraline (ZOLOFT) 100 MG tablet Take 1 tablet (100 mg) by mouth daily 7/21/2019 at AM Yes Marcy Soares PA-C   vitamin C (ASCORBIC ACID) 500 MG tablet Take 500 mg by mouth daily 7/21/2019 at AM Yes Reported, Patient   estradiol (ESTRACE) 0.1 MG/GM vaginal cream Place 2 g vaginally three times a week paraben-free 7/19/2019  Marcy Soares PA-C

## 2019-07-22 NOTE — ED TRIAGE NOTES
Pt arrives via ems with c/o nausea, no vomiting generalized weakness & intermittent fevers. EMS had a tympanic reading of 100.3 Pt lives alone in a senior living apartment

## 2019-07-22 NOTE — H&P
[facility]    History and Physical - Hospitalist Service       Date of Admission:  7/22/2019    Assessment & Plan   Daya Ignacio is a 88 year old female with past medical history of hypertension, chronic anemia, lumbar compression fractures with chronic back pain, multiple hospitalizations for recent ESBL UTIs as well as C. difficile and recent likely diverticular GI bleed and recent ER visit with back pain/suprapubic pain (started on Bactrim for possible UTI) followed by recent admission 7/13/2019-7/19/2019 with generalized weakness and acute kidney injury secondary to medications, potentially dehydration, and urinary retention (now resolved) who presented today with complaints of generalized weakness and nausea on 7/22/2019.     Work-up in the ER notable for urgently hypertensive blood pressure readings in the systolic 180s-210s, hypokalemia of 2.9, hypomagnesemia of 1.2, and catheterized urinalysis demonstrating positive nitrites and moderate leukocyte esterase.  She was given antiemetics, potassium and magnesium replacement per protocol, and 3 mg p.o. fosfomycin in the ER.  She was able to tolerate p.o. intake.  The ER provider indicated that the patient was not agreeable to the plan to discharge home as she was fearful her symptoms would recur.    1. Nausea: Patient developed significant nausea at home. No emesis. Seems resolved now. She was able to eat in the ER.  She is eating a cheeseburger right now. She denies abdominal pain.  She does not feel comfortable going home tonight. Antiemetics as needed.     2. Hypokalemia: Continue replacement per protocol and recheck BMP in AM.    3. Urgent HTN: PTA on multiple medications including amlodipine 10 mg daily, clonidine 0.1 mg twice daily, metoprolol succinate 100 mg daily and Isordil 10 mg 3 times daily.  Was taken off lisinopril on recent discharge given AYAKA. She did not take any of her BP meds today because she was feeling nauseous.  -Resume  amlodipine, clonidine, metoprolol, Isordil  -Could consider ordering PRN hydralazine if all PO options tried without improvement     4. Abnormal UA in setting of recent ESBL UTIs: UA today positive for nitrites with moderate leuk est, negative for blood. Reports increased urinary frequency and occasional hesitancy, but no dysuria, abdominal pain, or retention. Received one-time dose of 3 mg fosfomycin in the ER.  Recent previous ESBL E. coli UTI infections.  Was even hospitalized here for 10 days and received meropenem.  Culture 7/4 was positive for the E. coli ESBL and also Klebsiella variicola. Most recent urine culture on 7/11 did not grow anything.  -Received 1-time dose of 3 mg fosfomycin in the ER  -Follow urine culture     5. Possible mild L2 compression fx, age undetermined, chronic back pain: Has had some increase in her chronic low back pain more recently.  Has had compression fractures in the past.  Recent x-ray showing decreased height of the L2 vertebral body, so cannot rule out a mild compression fracture although age indeterminate.  She was taken off NSAIDs following discharge on 7/19/2019 given AYAKA.   -Continue PTA low dose Oxycodone (2.5 mg) for pain  -Schedule APAP and try ICY HOT patch PRN to see if helpful for breakthrough pain as she cannot be on NSAIDs  -Had a brace in the past which she did not tolerate   -Today resume gabapentin 200 mg twice daily. Dose could be increased if needed because her renal function has normalized.     6. Recent Cdiff: Recent C. difficile infection treated with oral vancomycin. No recent diarrhea. No abdominal pain.     7. Anemia, recent GI bleed: Hemoglobin is 10.0, stable from previous admission, has been off NSAIDs since most recent discharge. Was significantly lower in the setting of a lower GI bleed recently.  Recent GI bleed likely diverticular or related to her C. difficile infection.  Lab work on previous admission consistent with iron deficiency and  borderline B12 deficiency.        8. Anxiety: resume PTA sertraline 100 mg daily.    9. Physical deconditioning: Patient deconditioned from her multiple recent hospitalizations due to UTIs and C. difficile.  However, she states she moves quite well on her own with a cane or walker. Does have physical therapy at home which has been going well and should be resumed at discharge.     Diet: Regular diet  DVT Prophylaxis: Low Risk/Ambulatory with no VTE prophylaxis indicated  Almanza Catheter: not present  Code Status: FULL    Disposition Plan   Expected discharge: Tomorrow, recommended to prior living arrangement once adequate pain management/ tolerating PO medications.  Entered: Kathy Resendiz PA-C 07/22/2019, 4:51 PM     The patient's care was discussed with the Patient.    Kathy Resendiz PA-C  [facility]    ______________________________________________________________________    Chief Complaint   Nausea     History is obtained from the patient    History of Present Illness   Daya Ignacio is a 88 year old female with past medical history of hypertension, chronic anemia, lumbar compression fractures with chronic back pain, multiple hospitalizations for recent ESBL UTIs as well as C. difficile and recent likely diverticular GI bleed and recent ER visit with back pain/suprapubic pain (started on Bactrim for possible UTI) followed by recent admission 7/13/2019-7/19/2019 with generalized weakness and acute kidney injury secondary to medications, potentially dehydration, and urinary retention (now resolved) who presented today with complaints of generalized weakness and nausea on 7/22/2019.     The patient presented to the ER today after waking up feeling a general sense of malaise and nausea.  She did not have any emesis.  No abdominal pain.  No recent diarrhea, no reported hematochezia or melena.  No fevers.  No chest pain, shortness of breath, or cough.  She has chronic back pain with no recent change.   She reports increased urinary frequency and occasional hesitancy, but no abdominal pain or dysuria.  No gross hematuria.    Work-up in the ER notable for urgently hypertensive blood pressure readings in the systolic 180s-210s, hypokalemia of 2.9, hypomagnesemia of 1.2, and catheterized urinalysis demonstrating positive nitrites and moderate leukocyte esterase.  She was given antiemetics, potassium and magnesium replacement per protocol, and 3 mg p.o. fosfomycin in the ER.  She was able to tolerate p.o. intake.  The ER provider indicated that the patient was not agreeable to the plan to discharge home as she was fearful her symptoms would recur.    Review of Systems    The 10 point Review of Systems is negative other than noted in the HPI.    Past Medical History    I have reviewed this patient's medical history and updated it with pertinent information if needed.   Past Medical History:   Diagnosis Date     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 4/16/2014     Closed fracture of unspecified part of neck of femur      Closed fracture of unspecified part of vertebral column without mention of spinal cord injury     from fall summer 06     Clostridium difficile colitis 06/2019     Hypertension      Lumbar compression fracture (H)      Macular degeneration (senile) of retina, unspecified      Upper GI bleed 06/2019     Urinary tract infection due to extended-spectrum beta lactamase (ESBL)-producing Klebsiella 05/2019    resistant to Bactrim       Past Surgical History   I have reviewed this patient's surgical history and updated it with pertinent information if needed.  Past Surgical History:   Procedure Laterality Date     C NONSPECIFIC PROCEDURE      lt hip pain     ESOPHAGOSCOPY, GASTROSCOPY, DUODENOSCOPY (EGD), COMBINED N/A 6/11/2019    Procedure: ESOPHAGOGASTRODUODENOSCOPY (EGD);  Surgeon: Gaurav Degroot MD;  Location:  GI     ORTHOPEDIC SURGERY         Social History   I have reviewed this patient's  social history and updated it with pertinent information if needed.  Social History     Tobacco Use     Smoking status: Never Smoker     Smokeless tobacco: Never Used   Substance Use Topics     Alcohol use: No     Drug use: No       Family History   I have reviewed this patient's family history and updated it with pertinent information if needed.   Family History   Problem Relation Age of Onset     Alzheimer Disease Mother         mild     Cardiovascular Father         heart attack     Neurologic Disorder Brother 83        Parkinson's       Prior to Admission Medications   Prior to Admission Medications   Prescriptions Last Dose Informant Patient Reported? Taking?   Calcium Carbonate (CALCIUM 600 PO) 7/21/2019 at Unknown time  Yes Yes   Sig: Take 1 tablet by mouth 2 times daily    Cholecalciferol (VITAMIN D3) 2000 UNITS CAPS 7/21/2019 at Unknown time  Yes Yes   Sig: Take 2,000 Units by mouth daily (with dinner)   Multiple Vitamins-Minerals (PRESERVISION AREDS) CAPS 7/21/2019 at Unknown time  Yes Yes   Sig: Take 1 capsule by mouth 2 times daily    acetaminophen (TYLENOL) 325 MG tablet 7/21/2019 at Unknown time  No Yes   Sig: Take 2 tablets (650 mg) by mouth every 4 hours as needed for mild pain   amLODIPine (NORVASC) 10 MG tablet 7/21/2019 at Unknown time  No Yes   Sig: Take 1 tablet (10 mg) by mouth daily   cloNIDine (CATAPRES) 0.1 MG tablet 7/21/2019 at Unknown time  No Yes   Sig: TAKE ONE TABLET BY MOUTH TWO TIMES A DAY   estradiol (ESTRACE) 0.1 MG/GM vaginal cream 7/19/2019  No No   Sig: Place 2 g vaginally three times a week paraben-free   fish oil-omega-3 fatty acids 1000 MG capsule 7/21/2019 at Unknown time  Yes Yes   Sig: Take 1 g by mouth daily   gabapentin (NEURONTIN) 100 MG capsule 7/21/2019 at Unknown time  No Yes   Sig: Take 2 capsules (200 mg) by mouth 2 times daily   isosorbide dinitrate (ISORDIL) 10 MG tablet 7/21/2019 at Unknown time  No Yes   Sig: Take 1 tablet (10 mg) by mouth 3 times daily    metoprolol succinate ER (TOPROL-XL) 100 MG 24 hr tablet 7/21/2019 at AM  No Yes   Sig: Take 1 tablet (100 mg) by mouth daily   oxyCODONE (ROXICODONE) 5 MG tablet 7/21/2019 at Unknown time  No Yes   Sig: Take 0.5 tablets (2.5 mg) by mouth every 4 hours as needed for moderate to severe pain   senna (SENOKOT) 8.6 MG tablet Past Month at Unknown time  No Yes   Sig: Take 1 tablet by mouth 2 times daily as needed for constipation   sertraline (ZOLOFT) 100 MG tablet 7/21/2019 at AM  No Yes   Sig: Take 1 tablet (100 mg) by mouth daily   vitamin C (ASCORBIC ACID) 500 MG tablet 7/21/2019 at AM  Yes Yes   Sig: Take 500 mg by mouth daily      Facility-Administered Medications: None     Allergies   Allergies   Allergen Reactions     Atorvastatin Muscle Pain (Myalgia)     Macrobid [Nitrofurantoin Anhydrous] Other (See Comments)     Body aches     Sulfa Drugs      Tolerated Bactrim May 2019       Physical Exam   Vital Signs: Temp: 98.6  F (37  C) Temp src: Oral BP: (!) 204/103 Pulse: 75 Heart Rate: 72 Resp: 18 SpO2: 94 % O2 Device: None (Room air)    Weight: 0 lbs 0 oz    GENERAL:  Comfortable.  Eating a cheeseburger.  PSYCH: pleasant, oriented, No acute distress.  HEENT:  Atraumatic, normocephalic. PERRLA. Normal conjunctiva, normal hearing, and oropharynx is normal.  NECK:  Supple, no neck vein distention, adenopathy or bruits, normal thyroid.  HEART:  Normal S1, S2 with no murmur, no pericardial rub, gallops or S3 or S4.  LUNGS:  Clear to auscultation, normal respiratory effort. No wheezing, rales or ronchi.  GI:  Soft, no hepatosplenomegaly, normal bowel sounds. Non-tender, non distended.   EXTREMITIES:  No pedal edema, +2 pulses bilateral and equal.  SKIN:  Dry to touch, No rash, wound or ulcerations.  NEUROLOGIC:  CN 2-12 intact, BL 5/5 symmetric upper and lower extremity strength, sensation is intact with no focal deficits.     Data   Data reviewed today: I reviewed all medications, new labs and imaging results over the  last 24 hours. I personally reviewed:    Results for orders placed or performed during the hospital encounter of 07/22/19   CBC with platelets differential   Result Value Ref Range    WBC 5.7 4.0 - 11.0 10e9/L    RBC Count 3.28 (L) 3.8 - 5.2 10e12/L    Hemoglobin 10.0 (L) 11.7 - 15.7 g/dL    Hematocrit 30.3 (L) 35.0 - 47.0 %    MCV 92 78 - 100 fl    MCH 30.5 26.5 - 33.0 pg    MCHC 33.0 31.5 - 36.5 g/dL    RDW 14.6 10.0 - 15.0 %    Platelet Count 162 150 - 450 10e9/L    Diff Method Automated Method     % Neutrophils 76.8 %    % Lymphocytes 13.5 %    % Monocytes 7.3 %    % Eosinophils 1.2 %    % Basophils 0.5 %    % Immature Granulocytes 0.7 %    Nucleated RBCs 0 0 /100    Absolute Neutrophil 4.4 1.6 - 8.3 10e9/L    Absolute Lymphocytes 0.8 0.8 - 5.3 10e9/L    Absolute Monocytes 0.4 0.0 - 1.3 10e9/L    Absolute Eosinophils 0.1 0.0 - 0.7 10e9/L    Absolute Basophils 0.0 0.0 - 0.2 10e9/L    Abs Immature Granulocytes 0.0 0 - 0.4 10e9/L    Absolute Nucleated RBC 0.0    Comprehensive metabolic panel   Result Value Ref Range    Sodium 138 133 - 144 mmol/L    Potassium 2.9 (L) 3.4 - 5.3 mmol/L    Chloride 102 94 - 109 mmol/L    Carbon Dioxide 30 20 - 32 mmol/L    Anion Gap 6 3 - 14 mmol/L    Glucose 136 (H) 70 - 99 mg/dL    Urea Nitrogen 17 7 - 30 mg/dL    Creatinine 0.84 0.52 - 1.04 mg/dL    GFR Estimate 61 >60 mL/min/[1.73_m2]    GFR Estimate If Black 71 >60 mL/min/[1.73_m2]    Calcium 8.7 8.5 - 10.1 mg/dL    Bilirubin Total 0.8 0.2 - 1.3 mg/dL    Albumin 3.6 3.4 - 5.0 g/dL    Protein Total 7.0 6.8 - 8.8 g/dL    Alkaline Phosphatase 83 40 - 150 U/L    ALT 16 0 - 50 U/L    AST 13 0 - 45 U/L   Troponin I   Result Value Ref Range    Troponin I ES <0.015 0.000 - 0.045 ug/L   UA with Microscopic   Result Value Ref Range    Color Urine Light Yellow     Appearance Urine Slightly Cloudy     Glucose Urine Negative NEG^Negative mg/dL    Bilirubin Urine Negative NEG^Negative    Ketones Urine Negative NEG^Negative mg/dL    Specific  Gravity Urine 1.012 1.003 - 1.035    Blood Urine Negative NEG^Negative    pH Urine 7.0 5.0 - 7.0 pH    Protein Albumin Urine 20 (A) NEG^Negative mg/dL    Urobilinogen mg/dL Normal 0.0 - 2.0 mg/dL    Nitrite Urine Positive (A) NEG^Negative    Leukocyte Esterase Urine Moderate (A) NEG^Negative    Source Catheterized Urine     WBC Urine 31 (H) 0 - 5 /HPF    RBC Urine 2 0 - 2 /HPF    Bacteria Urine Few (A) NEG^Negative /HPF   Magnesium   Result Value Ref Range    Magnesium 1.2 (L) 1.6 - 2.3 mg/dL   Magnesium   Result Value Ref Range    Magnesium 3.2 (H) 1.6 - 2.3 mg/dL   Potassium   Result Value Ref Range    Potassium 3.3 (L) 3.4 - 5.3 mmol/L   EKG 12 lead   Result Value Ref Range    Interpretation ECG Click View Image link to view waveform and result    Urine Culture   Result Value Ref Range    Specimen Description Catheterized Urine     Special Requests Specimen received in preservative     Culture Micro PENDING

## 2019-07-22 NOTE — ED PROVIDER NOTES
History     Chief Complaint:  Nausea & Generalized Weakness      HPI   Daya Ignacio is a 88 year old female with a history of hypertension, hyperlipidemia, chronic anemia, NSTEMI, recurrent UTI, upper GI bleed, chronic back pain, and AYAKA who presents to the ED via EMS for evaluation of nausea and generalized weakness. Per chart review, the patient was recently admitted to [facility] from 7/13-7/19 after presenting with generalized weakness and back pain. At that time she was found to have an acute kidney injury with an elevated creatinine of 2.8. The patient had recently been hospitalized for multiple ESBL E. Coli UTI infections and prescribed Bactrim for a possible UTI. Over the course of her hospital stay, the patient's creatinine improved from 4.07 to 1.29 with urine culture returning without evidence of infection, and she was ultimately discharged with instructions to hold her lisinopril and decrease her gabapentin until normalization of kidney function. The patient's retention and nausea had resolved upon discharge and she felt well over the weekend.     However, the patient states that she began feeling fatigued yesterday evening, and this morning she awoke with severe nausea, generalized weakness, and a sense of malaise. She denies any emesis, diarrhea, constipation, fevers or chills, cough, chest pain, shortness of breath, headache, dysuria, or urinary symptoms with this. EMS was called to the facility, who reportedly told her that she had a low-grade fever. Here in the ED, the patient additionally reports an increase in her chronic low back pain, along with some bilateral calf pain at baseline. She denies that she has been taking ibuprofen for this. Of notes, she denies any history of abdominal surgery, cardiac history, or PE, although the patient does have a history of NSTEMI per chart review. She is not currently anticoagulated and lives alone in her independent living facility. The patient  "states that she has been administering medications herself but has nursing staff come to monitor vitals.     Allergies:  Atorvastatin  Macrobid [Nitrofurantoin Anhydrous]  Sulfa Drugs      Medications:    Amlodipine  Clonidine  Estradiol  Gabapentin  Isordil  Metoprolol  Oxycodone  Senokot  Zoloft    Past Medical History:    Anxiety  Osteoporosis  Sciatica  Hyperlipidemia  Hip fracture  Vitamin D deficiency  Hypertension  Branch retinal vein occlusion, right eye  Chronic back pain  NSTEMI  UTI  Lumbar compression fracture  C. Diff  AYAKA  Upper GI bleed  Hypokalemia    Past Surgical History:    Left hip surgery  EGD  Orthopedic surgery    Family History:    Alzheimer disease  MI  Parkinson's disease    Social History:  Negative for tobacco use.  Negative for alcohol use.  Negative for drug use.   Marital Status:  Single [1]     Review of Systems   Constitutional: Positive for fatigue. Negative for chills and fever.   Respiratory: Negative for shortness of breath.    Cardiovascular: Negative for chest pain.   Gastrointestinal: Positive for nausea. Negative for constipation, diarrhea and vomiting.   Genitourinary: Negative for dysuria.   Musculoskeletal: Positive for back pain.   Neurological: Positive for weakness (generalized). Negative for headaches.   All other systems reviewed and are negative.    Physical Exam     Patient Vitals for the past 24 hrs:   BP Temp Temp src Pulse Heart Rate Resp SpO2 Height Weight   07/22/19 1843 171/63 -- -- -- 75 -- -- -- --   07/22/19 1709 200/67 96.1  F (35.6  C) Oral 76 -- 20 98 % 1.549 m (5' 1\") 54.5 kg (120 lb 1.6 oz)   07/22/19 1530 (!) 204/103 -- -- 75 -- -- -- -- --   07/22/19 1515 (!) 203/87 -- -- 75 -- -- 94 % -- --   07/22/19 1500 (!) 203/88 -- -- 74 -- -- 94 % -- --   07/22/19 1430 (!) 228/98 -- -- 72 -- -- 97 % -- --   07/22/19 1415 -- -- -- -- -- -- 96 % -- --   07/22/19 1400 (!) 206/77 -- -- 80 -- -- 97 % -- --   07/22/19 1345 (!) 209/95 -- -- 73 -- -- 96 % -- -- "   07/22/19 1330 -- -- -- -- -- -- 96 % -- --   07/22/19 1315 185/84 -- -- 73 -- -- 94 % -- --   07/22/19 1300 195/85 -- -- 71 -- -- 93 % -- --   07/22/19 1245 (!) 202/85 -- -- 70 -- -- 94 % -- --   07/22/19 1230 195/83 -- -- 69 -- -- 94 % -- --   07/22/19 1215 (!) 203/87 -- -- 69 -- -- 99 % -- --   07/22/19 1115 -- -- -- -- -- -- 96 % -- --   07/22/19 1100 197/83 -- -- 67 -- -- 96 % -- --   07/22/19 1030 (!) 219/91 -- -- 71 -- -- 97 % -- --   07/22/19 1015 -- -- -- -- -- -- 98 % -- --   07/22/19 1000 182/78 -- -- 67 -- -- 99 % -- --   07/22/19 0957 186/86 98.6  F (37  C) Oral -- 72 18 98 % -- --      Physical Exam  General: Alert and interactive. Appears well, emesis bag in hand. Cooperative and pleasant.   Eyes: The pupils are equal and round. EOMs intact. No scleral icterus.  ENT: No abnormalities to the external nose or ears. Mucous membranes moist. Posterior oropharynx is non-erythematous.  Neck: Trachea is in the midline. No nuchal rigidity.     CV: Regular rate and rhythm, with occasional ectopic beats. S1 and S2 normal without murmur, click, gallop or rub. DP pulses 2+ bilaterally; no pedal edema.   Resp: Breath sounds are clear bilaterally, without rhonchi, wheezes, rales. Non-labored, no retractions or accessory muscle use.     GI: Abdomen is soft without distension. No tenderness to palpation. No peritoneal signs.    MS: Moving all extremities well. Good muscle tone. Tenderness to palpation of lumbar spine. Normal SLR bilaterally. Full ability to dorsiflex and plantar flex feet against resistance.   Skin: Warm and dry. No rash or lesions noted.  Neuro: CN II-XII grossly intact. Alert and oriented x 3. No focal neurologic deficits. Good strength and sensation in upper and lower extremities.    Psych: Awake. Alert.  Normal affect. Appropriate interactions.  Lymph: No anterior or posterior cervical lymphadenopathy noted.    Emergency Department Course   ECG:  Indication: Hypokalemia  Time: 1018  Vent. Rate 67  bpm. MN interval 162. QRS duration 86. QT/QTc 422/445. P-R-T axis 59 -38 22.  Normal sinus rhythm. Left axis deviation. Voltage criteria for LVH. Cannot rule out septal infarct, age undetermined. Abnormal ECG. No significant change compared to EKG dated 7/13/19. Read and signed by Dr. Quinn at 1020.     Laboratory:  CBC: WBC: 5.7, HGB: 10.0 (L), PLT: 162  CMP: Glucose 136 (H), K 2.9 (L), o/w WNL (Creatinine: 0.84)    1014 Troponin: <0.015    Magnesium: 1.2 (L)  Repeat magnesium: Magnesium 3.2 (H)    Potassium: 3.3 (L)    UA with Microscopic: Protein albumin 20 (A), Nitrite positive (A), Leukocyte esterase moderate (A), WBC 31 (H), Bacteria few (A), o/w WNL     Urine culture: pending    Interventions:  1014 Zofran, 4 mg, IV injection   1105 Klor-Con 60 mEq PO  1159 Magnesium sulfate 4 g IV  1355 Fosfomycin 3 g PO  1453 Zofran, 4 mg, IV injection   1607 Amlodipine 10 mg PO    Emergency Department Course:  Nursing notes and vitals reviewed. (1001) I performed an exam of the patient as documented above.     IV inserted. Medicine administered as documented above. Blood drawn. This was sent to the lab for further testing, results above.    EKG obtained in the ED, see results above.     (1128) I spoke with the patient's son, Gael, over the phone and updated him as to the patient's presentation and expected ED course.     (1142) I rechecked the patient and discussed the results of her workup thus far. She had not yet been able to provide a urine sample.    The patient provided a catheterized urine sample here in the emergency department. This was sent for laboratory testing, findings above.      (1255) I discussed the case with Pharmacy, who recommended starting the patient on Fosfomycin given her history of ESBL.    (1259) I called to inform the patient's son of her laboratory results and likely discharge. We discussed the plan to start her on oral antibiotics and he felt comfortable with this.     (9572) I  reevaluated the patient and provided an update in regards to her ED course.  At this time she was able to tolerate PO.    (1437) I rechecked the patient. We discussed discharge, although the patient did not feel comfortable going home at this time.      (1536)  I consulted with Kathy Rseendiz PA-C of the hospitalist services. They are in agreement to accept the patient for admission to Dr. Mckeon.    Findings and plan explained to the Patient and son who consents to admission. Discussed the patient with Kathy Resendiz for Dr. Mckeon, who will admit the patient to an observation bed for further monitoring, evaluation, and treatment.    Impression & Plan      Medical Decision Making:  Daya Ignacio is a 88 year old female with a history of hypertension, hyperlipidemia, chronic anemia, and STEMI, recurrent UTI with ESBL, upper GI bleed, chronic back pain, and the recent acute kidney injury presents for evaluation of nausea and generalized weakness. Patient was recently admitted after she was found to have a creatinine of 2.8, which is over 3 times her normal limits. The patient's creatinine gradually improved, and the patient has been fine since discharge home on July 19, three days ago. She woke up this morning feeling generally weak and feeling nauseous; she has had no vomiting or diarrhea.     On evaluation here, the patient appears well. She has some chronic back pain, presumably due to a previous L2 compression fracture, and neuropathy, for which she takes gabapentin. Her laboratory workup here is reassuring, as her creatinine is back down to a normal range. BMP shows hypokalemia and hypomagnesemia, which were replaced here. Upon redraw, potassium and magnesium appeared appropriately replaced. The patient felt better after Zofran and was able to tolerate PO intake, but she felt uncomfortable going home. Urine shows signs of infection with nitrite and leukocyte esterase positive, although these are chronically  abnormal. Discussed with pharmacy given history of ESBL and concern for AYAKA, and they recommended single dose of Fosfomycin. Will continue to follow cultures. She said she still felt weak and wanted to stay overnight. I discussed the case with MARGARITA Camilo accepting for Dr. Mckeon. She will accept the patient to an observation admission for repeat electrolyte draws and assurance that her symptoms are improving. Her blood pressure continued to trend upwards in the emergency department, and it was noted that she had held her lisinopril from the previous admission given AYAKA. We will start her on amlodipine and she was given her first dose here in the ED. Stable for admission at this point.     Diagnosis:    ICD-10-CM    1. Generalized muscle weakness M62.81 Urine Culture   2. Nausea R11.0    3. Acute cystitis without hematuria N30.00    4. Hypokalemia E87.6    5. Hypomagnesemia E83.42        Disposition:  Admitted to Dr. Mckeon      Scribe Disclosure:  I, Kathy Pappas, am serving as a scribe on 7/22/2019 at 10:01 AM to personally document services performed by Kasia Garay PA-C based on my observations and the provider's statements to me.      Kathy Pappas  7/22/2019   [facility] EMERGENCY DEPARTMENT       Kasia Garay PA-C  07/22/19 1944

## 2019-07-22 NOTE — ED NOTES
[facility]  ED Nurse Handoff Report    Daya Ignacio is a 88 year old female   ED Chief complaint: Nausea and Generalized Weakness  . ED Diagnosis:   Final diagnoses:   Generalized muscle weakness   Nausea   Acute cystitis without hematuria   Hypokalemia   Hypomagnesemia     Allergies:   Allergies   Allergen Reactions     Atorvastatin Muscle Pain (Myalgia)     Macrobid [Nitrofurantoin Anhydrous] Other (See Comments)     Body aches     Sulfa Drugs      Tolerated Bactrim May 2019       Code Status: Full Code  Activity level - Baseline/Home:  Stand by Assist. Activity Level - Current:   Assist X 1. Lift room needed: No. Bariatric: No   Needed: No   Isolation: Yes. Infection: Not Applicable  ESBL.     Vital Signs:   Vitals:    07/22/19 1315 07/22/19 1330 07/22/19 1415 07/22/19 1430   BP: 185/84      Pulse: 73      Resp:       Temp:       TempSrc:       SpO2: 94% 96% 96% 97%       Cardiac Rhythm:  ,      Pain level:    Patient confused: No. Patient Falls Risk: Yes.   Elimination Status: Has voided   Patient Report - Initial Complaint: Pt presents with nausea with associated generalized weakness. Focused Assessment: Pt presents with nausea and generalized weakness that began this morning when she woke up. Pt recently was discharged for a UTI, chronic back pain, and AYAKA. Pt found to have low potassium and magnesium in ED so this was replaced as well as another UTI which pt was given fosfomycin for. Pt did not feel comfortable going home  Tests Performed: labs. Abnormal Results:   Labs Ordered and Resulted from Time of ED Arrival Up to the Time of Departure from the ED   CBC WITH PLATELETS DIFFERENTIAL - Abnormal; Notable for the following components:       Result Value    RBC Count 3.28 (*)     Hemoglobin 10.0 (*)     Hematocrit 30.3 (*)     All other components within normal limits   COMPREHENSIVE METABOLIC PANEL - Abnormal; Notable for the following components:    Potassium 2.9 (*)     Glucose  136 (*)     All other components within normal limits   ROUTINE UA WITH MICROSCOPIC - Abnormal; Notable for the following components:    Protein Albumin Urine 20 (*)     Nitrite Urine Positive (*)     Leukocyte Esterase Urine Moderate (*)     WBC Urine 31 (*)     Bacteria Urine Few (*)     All other components within normal limits   MAGNESIUM - Abnormal; Notable for the following components:    Magnesium 1.2 (*)     All other components within normal limits   MAGNESIUM - Abnormal; Notable for the following components:    Magnesium 3.2 (*)     All other components within normal limits   POTASSIUM - Abnormal; Notable for the following components:    Potassium 3.3 (*)     All other components within normal limits   TROPONIN I   PERIPHERAL IV CATHETER   URINE CULTURE AEROBIC BACTERIAL       Treatments provided: PO potassium, IV magnesium, fosfomycin, zofran  Family Comments: n/a  OBS brochure/video discussed/provided to patient:  Yes  ED Medications:   Medications   magnesium sulfate 4 g in 100 mL sterile water (premade) (0 g Intravenous Stopped 7/22/19 1359)   ondansetron (ZOFRAN) injection 4 mg (has no administration in time range)   ondansetron (ZOFRAN) injection 4 mg (4 mg Intravenous Given 7/22/19 1014)   potassium chloride (KLOR-CON) Packet 60 mEq (60 mEq Oral Given 7/22/19 1105)   fosfomycin (MONUROL) Packet 3 g (3 g Oral Given 7/22/19 1355)     Drips infusing:  No  For the majority of the shift, the patient's behavior Green. Interventions performed were .     Severe Sepsis OR Septic Shock Diagnosis Present: No      ED Nurse Name/Phone Number: Claudette Lopez,   2:42 PM    RECEIVING UNIT ED HANDOFF REVIEW    Above ED Nurse Handoff Report was reviewed: Yes  Reviewed by: David Santos on July 22, 2019 at 4:10 PM

## 2019-07-22 NOTE — PROGRESS NOTES
ROOM # 226    Living Situation (if not independent, order SW consult):Ind in apartment  Facility name:  : Son Philomena Ignacio    Activity level at baseline: Ind with walker  Activity level on admit: SBA with Walker      Patient registered to observation; given Patient Bill of Rights; given the opportunity to ask questions about observation status and their plan of care.  Patient has been oriented to the observation room, bathroom and call light is in place.    Discussed discharge goals and expectations with patient/family.

## 2019-07-23 ENCOUNTER — TELEPHONE (OUTPATIENT)
Dept: FAMILY MEDICINE | Facility: CLINIC | Age: 84
End: 2019-07-23

## 2019-07-23 VITALS
DIASTOLIC BLOOD PRESSURE: 55 MMHG | SYSTOLIC BLOOD PRESSURE: 113 MMHG | TEMPERATURE: 97.9 F | HEIGHT: 61 IN | RESPIRATION RATE: 16 BRPM | OXYGEN SATURATION: 96 % | HEART RATE: 76 BPM | BODY MASS INDEX: 22.68 KG/M2 | WEIGHT: 120.1 LBS

## 2019-07-23 LAB
ANION GAP SERPL CALCULATED.3IONS-SCNC: 6 MMOL/L (ref 3–14)
BASOPHILS # BLD AUTO: 0 10E9/L (ref 0–0.2)
BASOPHILS NFR BLD AUTO: 0.7 %
BUN SERPL-MCNC: 18 MG/DL (ref 7–30)
CALCIUM SERPL-MCNC: 9 MG/DL (ref 8.5–10.1)
CHLORIDE SERPL-SCNC: 106 MMOL/L (ref 94–109)
CO2 SERPL-SCNC: 26 MMOL/L (ref 20–32)
CREAT SERPL-MCNC: 0.84 MG/DL (ref 0.52–1.04)
DIFFERENTIAL METHOD BLD: ABNORMAL
EOSINOPHIL # BLD AUTO: 0.2 10E9/L (ref 0–0.7)
EOSINOPHIL NFR BLD AUTO: 2.9 %
ERYTHROCYTE [DISTWIDTH] IN BLOOD BY AUTOMATED COUNT: 15 % (ref 10–15)
GFR SERPL CREATININE-BSD FRML MDRD: 62 ML/MIN/{1.73_M2}
GLUCOSE SERPL-MCNC: 87 MG/DL (ref 70–99)
HCT VFR BLD AUTO: 31.9 % (ref 35–47)
HGB BLD-MCNC: 10.4 G/DL (ref 11.7–15.7)
IMM GRANULOCYTES # BLD: 0.1 10E9/L (ref 0–0.4)
IMM GRANULOCYTES NFR BLD: 0.8 %
LYMPHOCYTES # BLD AUTO: 1.3 10E9/L (ref 0.8–5.3)
LYMPHOCYTES NFR BLD AUTO: 21.2 %
MCH RBC QN AUTO: 30.7 PG (ref 26.5–33)
MCHC RBC AUTO-ENTMCNC: 32.6 G/DL (ref 31.5–36.5)
MCV RBC AUTO: 94 FL (ref 78–100)
MONOCYTES # BLD AUTO: 0.6 10E9/L (ref 0–1.3)
MONOCYTES NFR BLD AUTO: 9.3 %
NEUTROPHILS # BLD AUTO: 4 10E9/L (ref 1.6–8.3)
NEUTROPHILS NFR BLD AUTO: 65.1 %
NRBC # BLD AUTO: 0 10*3/UL
NRBC BLD AUTO-RTO: 0 /100
PLATELET # BLD AUTO: 207 10E9/L (ref 150–450)
POTASSIUM SERPL-SCNC: 4.3 MMOL/L (ref 3.4–5.3)
RBC # BLD AUTO: 3.39 10E12/L (ref 3.8–5.2)
SODIUM SERPL-SCNC: 138 MMOL/L (ref 133–144)
WBC # BLD AUTO: 6.1 10E9/L (ref 4–11)

## 2019-07-23 PROCEDURE — 80048 BASIC METABOLIC PNL TOTAL CA: CPT | Performed by: PHYSICIAN ASSISTANT

## 2019-07-23 PROCEDURE — 36415 COLL VENOUS BLD VENIPUNCTURE: CPT | Performed by: PHYSICIAN ASSISTANT

## 2019-07-23 PROCEDURE — G0378 HOSPITAL OBSERVATION PER HR: HCPCS

## 2019-07-23 PROCEDURE — 25000132 ZZH RX MED GY IP 250 OP 250 PS 637: Mod: GY | Performed by: PHYSICIAN ASSISTANT

## 2019-07-23 PROCEDURE — 85025 COMPLETE CBC W/AUTO DIFF WBC: CPT | Performed by: PHYSICIAN ASSISTANT

## 2019-07-23 PROCEDURE — 99217 ZZC OBSERVATION CARE DISCHARGE: CPT | Performed by: PHYSICIAN ASSISTANT

## 2019-07-23 RX ADMIN — METOPROLOL SUCCINATE 100 MG: 100 TABLET, EXTENDED RELEASE ORAL at 08:06

## 2019-07-23 RX ADMIN — AMLODIPINE BESYLATE 10 MG: 10 TABLET ORAL at 08:05

## 2019-07-23 RX ADMIN — ACETAMINOPHEN 1000 MG: 500 TABLET, FILM COATED ORAL at 00:44

## 2019-07-23 RX ADMIN — ISOSORBIDE DINITRATE 10 MG: 10 TABLET ORAL at 08:05

## 2019-07-23 RX ADMIN — GABAPENTIN 200 MG: 100 CAPSULE ORAL at 08:06

## 2019-07-23 RX ADMIN — CLONIDINE HYDROCHLORIDE 0.1 MG: 0.1 TABLET ORAL at 08:06

## 2019-07-23 RX ADMIN — SERTRALINE HYDROCHLORIDE 100 MG: 100 TABLET ORAL at 08:05

## 2019-07-23 NOTE — PLAN OF CARE
PRIMARY DIAGNOSIS: GENERALIZED WEAKNESS    OUTPATIENT/OBSERVATION GOALS TO BE MET BEFORE DISCHARGE  1. Orthostatic performed: N/A    2. Tolerating PO medications: Yes    3. Return to near baseline physical activity: Yes    4. Cleared for discharge by consultants (if involved): N/A    Patient alert and oriented x4. Vitals are Temp: 97.6  F (36.4  C) Temp src: Oral BP: 152/62 Pulse: 76 Heart Rate: 61 Resp: 16 SpO2: 95 % RA. Denies pain and nausea. Tolerating regular diet. External catheter in place. Patient up with standby assist with walker. On contact precautions for ESBL. IV saline locked. Continuing to monitor labs and assist with supportive cares.     Discharge Planner Nurse   Safe discharge environment identified: Yes  Barriers to discharge: No       Entered by: Stacia Kumar 07/23/2019      Please review provider order for any additional goals.   Nurse to notify provider when observation goals have been met and patient is ready for discharge.

## 2019-07-23 NOTE — PLAN OF CARE
PRIMARY DIAGNOSIS: Nausea    OUTPATIENT/OBSERVATION GOALS TO BE MET BEFORE DISCHARGE  1. Orthostatic performed: No    2. Tolerating PO fluid and/or antibiotics (if applicable): Yes    3. Nausea/Vomiting/Diarrhea symptoms improved: Yes    4. Pain status: Pain free.    5. Return to near baseline physical activity: Yes    Discharge Planner Nurse   Safe discharge environment identified: Yes  Barriers to discharge: No       Entered by: Elizabeth Green 07/23/2019 9:35 AM     Please review provider order for any additional goals.   Nurse to notify provider when observation goals have been met and patient is ready for discharge.    Patient is alert and oriented. Patient complains of intermittent nausea but tolerated a regular diet of Setswana toast and muffin this morning for breakfast. Patient is up SBA with walker. Patient lives indep with home health services. Patient's Potassium and Mag was replaced and are now WNL at K+ 4.3, and Mg 3.2. Patient has an ice hot patch on back for chronic back pain. BP elevated prior to BP meds, will recheck after BP meds given. Will continue to monitor.

## 2019-07-23 NOTE — TELEPHONE ENCOUNTER
-    Please call to schedule patient sooner than 8/14/19     Thank you   Jenniffer Garcia, Registered Nurse   [facility]

## 2019-07-23 NOTE — PLAN OF CARE
PRIMARY DIAGNOSIS: GENERALIZED WEAKNESS    OUTPATIENT/OBSERVATION GOALS TO BE MET BEFORE DISCHARGE  1. Orthostatic performed: N/A    2. Tolerating PO medications: Yes    3. Return to near baseline physical activity: Yes    4. Cleared for discharge by consultants (if involved): N/A    Patient alert and oriented x4. Vitals are Temp: 97  F (36.1  C) Temp src: Oral BP: 138/66 Pulse: 76 Heart Rate: 59 Resp: 16 SpO2: 94 % RA. Denies pain and nausea. Tolerating regular diet. External catheter in place. Patient up with standby assist with walker. On contact precautions for ESBL. IV saline locked. Continuing to monitor labs and assist with supportive cares.     Discharge Planner Nurse   Safe discharge environment identified: Yes  Barriers to discharge: No       Entered by: Stacia Kumar 07/23/2019      Please review provider order for any additional goals.   Nurse to notify provider when observation goals have been met and patient is ready for discharge.

## 2019-07-23 NOTE — DISCHARGE SUMMARY
[facility]  Hospitalist Discharge Summary       Date of Admission:  7/22/2019  Date of Discharge:  7/23/2019  Discharging Provider: Alisson Gonzalez PA-C      Discharge Diagnoses   Nausea, self-resolved  Hypokalemia, replaced  Hypertension  Abnormal UA, history of ESBL UTI  Possible L2 compression fracture, chronic back pain  Anxiety    Follow-ups Needed After Discharge   Follow-up Appointments     Follow-up and recommended labs and tests       Follow up with primary care provider, Marcy Soares, within 7 days   for hospital follow- up.  No follow up labs or test are needed.           Unresulted Labs Ordered in the Past 30 Days of this Admission     Date and Time Order Name Status Description    7/22/2019 1207 Urine Culture Preliminary       These results will be followed up by hospitalist team    Discharge Disposition   Discharged to home  Condition at discharge: Stable    Hospital Course   This is an 88 year-old woman with history of ESBL UTI, recent AYAKA due to ATN (since resolved), hypertension, and anxiety admitted for nausea which had resolved by time of admission.  Potassium and magnesium replaced and normalized by time of discharge.  Tolerating regular diet.  No recurrence of symptoms and no further inpatient work-up was indicated.  Discussed close follow-up with primary care and working with primary care providers regarding less urgent medical needs.     Urinalysis with pyuria and nitrites.  Received a dose of fosfomycin in ED.  Urine culture will be monitored.  Most recent urine cultures have been with no growth.      Consultations This Hospital Stay   None    Code Status   Full Code    Time Spent on this Encounter   I, Alisson Gonzalez, personally saw the patient today and spent less than or equal to 30 minutes discharging this patient.       Alisson Gonzalez PA-C  [facility]  ______________________________________________________________________    Physical Exam    Vital Signs: Temp: 97.9  F (36.6  C) Temp src: Oral BP: 113/55 Pulse: 76 Heart Rate: 52 Resp: 16 SpO2: 96 % O2 Device: None (Room air)    Weight: 120 lbs 1.6 oz    Constitutional: nontoxic appearing woman sitting up in a chair  Eyes: no icterus  HEENT: mucous membranes moist  Respiratory: clear to auscultation bilaterally  Cardiovascular: RRR  GI: normoactive bowel sounds, soft, nontender  Genitourinary: no catheter  Skin: warm and dry  Musculoskeletal: normal muscle bulk and tone  Neurologic: nonfocal  Psychiatric: alert, oriented           Primary Care Physician   Marcy Soares    Discharge Orders      Reason for your hospital stay    You were in the hospital for evaluation of nausea which resolved on its own.     Follow-up and recommended labs and tests     Follow up with primary care provider, Marcy Soares, within 7 days for hospital follow- up.  No follow up labs or test are needed.     Activity    Your activity upon discharge: activity as tolerated     When to contact your care team    Call your primary doctor if you have any of the following:   --recurrent nausea  --weakness     Full Code     Diet    Follow this diet upon discharge: Orders Placed This Encounter      Regular Diet Adult       Significant Results and Procedures   Most Recent 3 BMP's:  Recent Labs   Lab Test 07/23/19  0632 07/22/19  1355 07/22/19  1014 07/19/19  0525     --  138 141   POTASSIUM 4.3 3.3* 2.9* 3.5   CHLORIDE 106  --  102 107   CO2 26  --  30 28   BUN 18  --  17 27   CR 0.84  --  0.84 1.29*   ANIONGAP 6  --  6 6   DAVE 9.0  --  8.7 8.4*   GLC 87  --  136* 98   ,   Results for orders placed or performed during the hospital encounter of 07/13/19   Head CT w/o contrast    Narrative    CT SCAN OF THE HEAD WITHOUT CONTRAST July 13, 2019 2:25 PM     HISTORY: Closed head injury, headache.    TECHNIQUE: Axial images of the head and coronal reformations without  IV contrast material. Radiation dose for this scan was reduced  using  automated exposure control, adjustment of the mA and/or kV according  to patient size, or iterative reconstruction technique.    COMPARISON: 5/26/2019.    FINDINGS:     Intracranial contents: No midline shift, mass, mass effect or  hemorrhage. Mild chronic ischemic changes deep white matter both  cerebral hemispheres. No extra-axial blood products or fluid  collections are identified. Vascular calcification. No acute orbital  process with post procedure changes both lobes. Bone algorithm shows  no fracture of the calvarium or skull base. There is no evidence of  intracranial hemorrhage, mass, acute infarct or anomaly.    Visualized orbits/sinuses/mastoids:  The visualized portions of the  sinuses and mastoids appear normal.    Osseous structures/soft tissues: No fracture. Skull base  demineralization.      Impression    IMPRESSION: Chronic white matter changes intracranially as above.  Overall no change from 5/26/2019 and no acute process.     ARIEL RIVERS MD   Cervical spine CT w/o contrast    Narrative    CT OF THE CERVICAL SPINE WITHOUT CONTRAST July 13, 2019 2:25 PM     HISTORY: Fall, neck pain.     TECHNIQUE: Axial images of the cervical spine were acquired without  intravenous contrast. Multiplanar reformations were created. Radiation  dose for this scan was reduced using automated exposure control,  adjustment of the mA and/or kV according to patient size, or iterative  reconstruction technique.     COMPARISON: 5/8/2017.    FINDINGS: Degenerative cervical spondylosis with satisfactory height.  3 mm anterior subluxation C3 upon C4 with 1 mm posterior subluxation  C4 upon C5 on C5 upon C6. 1 mm anterior subluxation of C7 upon T1. No  evidence for fracture of the skull base/foramen magnum or occipital  condyle level. Normal cervical prevertebral soft tissues. Rightward  cervicothoracic convexity curve. Posterior fossa structures shows no  acute abnormality. Poorly defined focal infiltrate left upper  lung.  Secretions within the thoracic inlet level esophagus. No fracture,  subluxation or dislocation. Degenerative changes in the spine with  vascular calcification are noted.      Impression    IMPRESSION: No acute fracture in the cervical spine. Since prior  study, stable alignment and degenerative changes.      ARIEL RIVERS MD   Lumbar spine XR, 2-3 views    Narrative    LUMBAR SPINE TWO - THREE VIEWS   7/13/2019 2:31 PM     HISTORY: Pain, fall.    COMPARISON: X-ray 6/22/2019.    FINDINGS: Alignment is significant for slight S-shaped curvature. T11  compression fracture with moderate to severe height loss is unchanged.  L1 compression fracture with vertebra plana is unchanged. There is  increased height loss of the L2 vertebral body with suggestion of  slightly increased superior endplate sclerosis along the posterior  aspect of the vertebral body. L3 compression fracture with mild height  loss is unchanged. Multilevel severe degenerative disc disease and  facet arthropathy are present. Scattered vascular calcifications are  present.      Impression    IMPRESSION: 1  1. Apparent slightly worsened height loss of the L2 vertebral body  with questionable increased sclerosis. New mild compression fracture  cannot be excluded.  2. No change of multiple old compression fractures and severe  degenerative change.    INES BONILLA MD   US Renal Complete    Narrative    ULTRASOUND RETROPERITONEAL COMPLETE 7/14/2019 11:20 AM     HISTORY: AYAKA, had urinary retention.    COMPARISON: None.    FINDINGS: The bilateral renal parenchyma are normal in echogenicity  without evidence for shadowing stone or mass. No renal cysts. No  hydronephrosis. Right kidney measures 10.3 x 4.5 x 5.2 cm and the left  measures 11.0 x 4.1 x 5.9 cm. Cortical thickness is 1.9 cm on the  right and 1.8 cm on the left. Bladder is completely decompressed.      Impression    IMPRESSION: Negative bilateral renal ultrasound.    TONY HUNT MD        Discharge Medications   Current Discharge Medication List      CONTINUE these medications which have NOT CHANGED    Details   acetaminophen (TYLENOL) 325 MG tablet Take 2 tablets (650 mg) by mouth every 4 hours as needed for mild pain    Associated Diagnoses: Closed compression fracture of second lumbar vertebra, sequela      amLODIPine (NORVASC) 10 MG tablet Take 1 tablet (10 mg) by mouth daily  Qty: 90 tablet, Refills: 1    Associated Diagnoses: Non-STEMI (non-ST elevated myocardial infarction) (H)      Calcium Carbonate (CALCIUM 600 PO) Take 1 tablet by mouth 2 times daily       Cholecalciferol (VITAMIN D3) 2000 UNITS CAPS Take 2,000 Units by mouth daily (with dinner)      cloNIDine (CATAPRES) 0.1 MG tablet TAKE ONE TABLET BY MOUTH TWO TIMES A DAY  Qty: 180 tablet, Refills: 0    Associated Diagnoses: Essential hypertension      fish oil-omega-3 fatty acids 1000 MG capsule Take 1 g by mouth daily      gabapentin (NEURONTIN) 100 MG capsule Take 2 capsules (200 mg) by mouth 2 times daily  Qty: 120 capsule, Refills: 0    Associated Diagnoses: Other chronic pain      isosorbide dinitrate (ISORDIL) 10 MG tablet Take 1 tablet (10 mg) by mouth 3 times daily  Qty: 270 tablet, Refills: 3    Associated Diagnoses: Non-STEMI (non-ST elevated myocardial infarction) (H)      metoprolol succinate ER (TOPROL-XL) 100 MG 24 hr tablet Take 1 tablet (100 mg) by mouth daily  Qty: 90 tablet, Refills: 1    Associated Diagnoses: Essential hypertension      Multiple Vitamins-Minerals (PRESERVISION AREDS) CAPS Take 1 capsule by mouth 2 times daily       oxyCODONE (ROXICODONE) 5 MG tablet Take 0.5 tablets (2.5 mg) by mouth every 4 hours as needed for moderate to severe pain  Qty: 10 tablet, Refills: 0    Associated Diagnoses: Closed compression fracture of second lumbar vertebra, sequela      senna (SENOKOT) 8.6 MG tablet Take 1 tablet by mouth 2 times daily as needed for constipation  Qty: 20 tablet, Refills: 0      sertraline  (ZOLOFT) 100 MG tablet Take 1 tablet (100 mg) by mouth daily  Qty: 90 tablet, Refills: 1    Associated Diagnoses: Generalized anxiety disorder      vitamin C (ASCORBIC ACID) 500 MG tablet Take 500 mg by mouth daily      estradiol (ESTRACE) 0.1 MG/GM vaginal cream Place 2 g vaginally three times a week paraben-free  Qty: 6 g, Refills: 3    Associated Diagnoses: Symptomatic menopausal or female climacteric states           Allergies   Allergies   Allergen Reactions     Atorvastatin Muscle Pain (Myalgia)     Macrobid [Nitrofurantoin Anhydrous] Other (See Comments)     Body aches     Sulfa Drugs      Tolerated Bactrim May 2019

## 2019-07-23 NOTE — PLAN OF CARE
Patient's After Visit Summary was reviewed with patient.  Patient verbalized understanding of After Visit Summary, recommended follow up and was given an opportunity to ask questions.   Discharge medications sent home with patient/family: No   Discharged with other:daughter in law    OBSERVATION patient END time: 1044    Patient was stable and provided a wheelchair ride out at discharge.

## 2019-07-23 NOTE — PLAN OF CARE
PRIMARY DIAGNOSIS: GENERALIZED WEAKNESS    OUTPATIENT/OBSERVATION GOALS TO BE MET BEFORE DISCHARGE  1. Orthostatic performed: No    2. Tolerating PO medications: Yes    3. Return to near baseline physical activity: No - SBA with walker    4. Cleared for discharge by consultants (if involved): N/A    Potassium 3.3 and replaced, recheck in the morning.  Pt. Denies nausea, tolerated cheeseburger.      Discharge Planner Nurse   Safe discharge environment identified: Yes  Barriers to discharge: No       Entered by: Eros Ruiz 07/22/2019 11:33 PM     Please review provider order for any additional goals.   Nurse to notify provider when observation goals have been met and patient is ready for discharge.

## 2019-07-23 NOTE — PROGRESS NOTES
Infection Prevention:    Patient requires Contact precautions because of ESBL: urine, 2019. Please contact Infection Prevention with any questions/concerns at *[phone].    Annette Fatima, ICP

## 2019-07-23 NOTE — TELEPHONE ENCOUNTER
Please call. Patient needs hospital f/u with physician sooner than 8/14/19. PCP will be out of office next week.   She can see another provider.

## 2019-07-24 ENCOUNTER — PATIENT OUTREACH (OUTPATIENT)
Dept: CARE COORDINATION | Facility: CLINIC | Age: 84
End: 2019-07-24

## 2019-07-24 LAB
BACTERIA SPEC CULT: ABNORMAL
BACTERIA SPEC CULT: ABNORMAL
Lab: ABNORMAL
SPECIMEN SOURCE: ABNORMAL

## 2019-07-24 ASSESSMENT — ACTIVITIES OF DAILY LIVING (ADL): DEPENDENT_IADLS:: TRANSPORTATION;CLEANING

## 2019-07-24 NOTE — TELEPHONE ENCOUNTER
Pt scheduled with Dr Omer 07/29/19 for hospital follow up-called pt and left message,    Tiffany Nelson/RYAN

## 2019-07-24 NOTE — PROGRESS NOTES
Clinic Care Coordination Contact  OUTREACH    Referral Information:  Referral Source: IP Handoff    Chief Complaint   Patient presents with     Clinic Care Coordination - Initial     with this writer     Clinic Care Coordination - Post Hospital     x2         Universal Utilization: Patient was hospitalized 07/13 - 07/19 with a diagnosis of AYAKA, and 07/22 to 07/23 with a diagnosis of nausea, hypokalemia, hypertension.   Utilization    Last refreshed: 7/24/2019  1:25 PM:  Hospital Admissions 6           Last refreshed: 7/24/2019  1:25 PM:  ED Visits 2           Last refreshed: 7/24/2019  1:25 PM:  No Show Count (past year) 2              Current as of: 7/24/2019  1:25 PM            Clinical Concerns:  Current Medical Concerns:  Patient reports home care has not yet contacted her but has the number. Reports not feeling that well today doesn't quite know why, couldn't describe further, hopeful she will feel better tomorrow.   Current Behavioral Concerns: None noted.     Education Provided to patient: CC role, reviewed upcoming scheduled appointments.   Pain  Pain (GOAL):: No - patient denies although has diagnosis of chronic back pain.   Health Maintenance Reviewed: Not assessed  Clinical Pathway: None    Medication Management:  Patient reports she independently manages her own medications. No concerns. Reviewed discharge medication instructions.     Functional Status:  Dependent ADLs:: Ambulation-walker  Dependent IADLs:: Transportation, Cleaning  Bed or wheelchair confined:: No  Mobility Status: Independent w/Device    Living Situation:  Current living arrangement:: I live alone([facility])  Type of residence:: Independent Senior Living    Diet/Exercise/Sleep:  Diet:: Regular  Inadequate nutrition (GOAL):: No(Fair)  Food Insecurity: No  Tube Feeding: No  Inadequate activity/exercise (GOAL):: No  Significant changes in sleep pattern (GOAL): No    Transportation:  Transportation concerns (GOAL):: No  Transportation  means:: Family(son)     Psychosocial:  Mental health DX:: No  Mental health management concern (GOAL):: No  Informal Support system:: Children, Neighbors     Financial/Insurance:   Financial/Insurance concerns (GOAL):: No     Resources and Interventions:  Current Resources:   List of home care services:: Skilled Nursing, Physicial Therapy;   Community Resources: Home Care, Housekeeping/Chore Agency     Equipment Currently Used at Home: grab bar, toilet, grab bar, tub/shower, shower chair, walker, rolling, cane, straight    Advance Care Plan/Directive  Advanced Care Plans/Directives on file:: No    Referrals Placed: None     Goals:   Goals        General    1. Utilization (pt-stated)     Notes - Note created  7/24/2019  1:59 PM by Elizabeth Simmons RN    Goal Statement: I want to remain free of re-hospitalization for at least the next 30 days   Measure of Success: Evidenced by no recurrence of hospitalization in the next 30 days  Supportive Steps to Achieve:   I will participate in Home Health Care therapy for strength and endurance  I will attend my post hospital follow up appointment  RN CC will offer ongoing check in and support; connection to additional community resources if requested  Barriers: Lives alone  Strengths: Motivated to participate in home health care, has a post-hospital follow up scheduled  Date to Achieve By: 10/2019            Patient/Caregiver understanding: Patient verbalized understanding and denies any additional questions or concerns at this time. RNCC engaged in AIDET communications during encounter.     Outreach Frequency: monthly  Future Appointments              In 5 days Dee Dee Omer MD; CR EXAM ROOM 06 [facility]    In 3 weeks 3a, Cr Marissa Pal; Marcy Soares PA-C; CR EXAM ROOM 03 [facility]          Plan: RNCC to mail introduction letter, complex care plan, medication list to patient. Patient was provided with writers contact information  and encouraged to call with questions, concerns, support needs. RNCC will remain available as needed. RNCC will follow up with patient again next month    Elizabeth Simmons RN Care Coordinator  [facility]  Email: [email or web address]  Phone: [phone]

## 2019-07-24 NOTE — LETTER
[facility]  Complex Care Plan  About Me:    Patient Name:  Daya Mcdaniels    YOB: 1930  Age:         88 year old   Baljinder MRN:    [id number] Telephone Information:  Home Phone [phone]   Mobile [phone]       Address:  [address] [place] [id number] Email address:  No e-mail address on record      Emergency Contact(s)    Name Relationship Lgl Grd Work Phone Home Phone Mobile Phone   1. KIMBERLY MCDANIELS  [phone]           Primary language:  English     needed? No   [facility] [employer]:  [phone] op. 1  Other communication barriers:    Preferred Method of Communication:  Mail  Current living arrangement: I live alone([facility])  Mobility Status/ Medical Equipment: Independent w/Device    Health Maintenance  Health Maintenance Reviewed:   Health Maintenance Topics with due status: Overdue       Topic Date Due    HF ACTION PLAN 10/11/1930    URINE DRUG SCREEN 10/11/1930    DEPRESSION ACTION PLAN 10/11/1930    MEDICARE ANNUAL WELLNESS VISIT 06/22/2008    ZOSTER IMMUNIZATION 11/01/2013    ADVANCE CARE PLANNING 06/17/2016    DEXA 04/25/2017    FALL RISK ASSESSMENT 02/05/2019     My Access Plan  Medical Emergency 911   Primary Clinic Line [facility] - [phone]   24 Hour Appointment Line [phone] or  [phone]) (toll-free)   24 Hour Nurse Line [phone] (toll-free)   Preferred Urgent Care     Preferred Hospital [facility]  [phone]   Preferred Pharmacy [facility] [place], [place] - [address]     Behavioral Health Crisis Line The National Suicide Prevention Lifeline at 1-847.252.6990 or 911     My Care Team Members  Patient Care Team       Relationship Specialty Notifications Start End    Marcy Soares PA-C PCP - General Family Practice  10/18/12     Phone: [phone] Fax: [phone]          [address] [facility] [address]    Marcy Soares PA-C Assigned PCP   10/4/12     Phone: [phone] Fax: [phone]         [address] [facility] [address]    [facility]  HOME HEALTH AGENCY ([facility] ([place])  5/30/19     Phone: [phone]         Elizabeth Simmons, RN Lead Care Coordinator   7/11/19     Phone: [phone]                 My Care Plans  Self Management and Treatment Plan  Goals and (Comments)  Goals        General    1. Utilization (pt-stated)     Notes - Note created  7/24/2019  1:59 PM by Elizabeth Simmons, RN    Goal Statement: I want to remain free of re-hospitalization for at least the next 30 days   Measure of Success: Evidenced by no recurrence of hospitalization in the next 30 days  Supportive Steps to Achieve:   I will participate in Home Health Care therapy for strength and endurance  I will attend my post hospital follow up appointment  RN CC will offer ongoing check in and support; connection to additional community resources if requested  Barriers: Lives alone  Strengths: Motivated to participate in home health care, has a post-hospital follow up scheduled  Date to Achieve By: 10/2019               Action Plans on File:                       Advance Care Plans/Directives Type:        My Medical and Care Information  Problem List   Patient Active Problem List   Diagnosis     Generalized anxiety disorder     Osteoporosis     Sciatica     HYPERLIPIDEMIA LDL GOAL <130     Hip fracture (H)     Advanced directives, counseling/discussion     Vitamin D deficiency     HTN (hypertension)     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     Hypertension goal BP (blood pressure) < 140/90     Chronic pain     NSTEMI (non-ST elevated myocardial infarction) (H)     Urinary tract infection without hematuria     Hip pain     Trochanteric fracture of left femur (H)     Diarrhea     Fall     Elevated troponin     Hypokalemia     UTI (urinary tract infection)     Back  pain     Urinary tract infection due to extended-spectrum beta lactamase (ESBL)-producing Klebsiella     Upper GI bleed     Lumbar compression fracture (H)     Hypertension     Clostridium difficile colitis     Urinary tract infection     Urinary tract infection due to extended-spectrum beta lactamase (ESBL) producing Escherichia coli     AYAKA (acute kidney injury) (H)     Generalized weakness      Current Medications and Allergies:  See printed Medication Report.    Care Coordination Start Date: 7/24/2019   Frequency of Care Coordination: monthly   Form Last Updated: 07/24/2019

## 2019-07-28 ENCOUNTER — HOSPITAL ENCOUNTER (INPATIENT)
Facility: CLINIC | Age: 84
LOS: 4 days | Discharge: NURSING, REHAB OR LONG-TERM CARE | DRG: 696 | End: 2019-08-02
Attending: EMERGENCY MEDICINE | Admitting: INTERNAL MEDICINE
Payer: MEDICARE

## 2019-07-28 DIAGNOSIS — Z16.12 URINARY TRACT INFECTION DUE TO EXTENDED-SPECTRUM BETA LACTAMASE (ESBL) PRODUCING ESCHERICHIA COLI: ICD-10-CM

## 2019-07-28 DIAGNOSIS — R11.0 NAUSEA: Primary | ICD-10-CM

## 2019-07-28 DIAGNOSIS — R33.9 URINARY RETENTION: ICD-10-CM

## 2019-07-28 DIAGNOSIS — B96.29 URINARY TRACT INFECTION DUE TO EXTENDED-SPECTRUM BETA LACTAMASE (ESBL) PRODUCING ESCHERICHIA COLI: ICD-10-CM

## 2019-07-28 DIAGNOSIS — N39.0 URINARY TRACT INFECTION DUE TO EXTENDED-SPECTRUM BETA LACTAMASE (ESBL) PRODUCING ESCHERICHIA COLI: ICD-10-CM

## 2019-07-28 DIAGNOSIS — R19.7 DIARRHEA, UNSPECIFIED TYPE: ICD-10-CM

## 2019-07-28 LAB
ALBUMIN UR-MCNC: 10 MG/DL
AMORPH CRY #/AREA URNS HPF: ABNORMAL /HPF
ANION GAP SERPL CALCULATED.3IONS-SCNC: 7 MMOL/L (ref 3–14)
APPEARANCE UR: CLEAR
BASOPHILS # BLD AUTO: 0 10E9/L (ref 0–0.2)
BASOPHILS NFR BLD AUTO: 0.4 %
BILIRUB UR QL STRIP: NEGATIVE
BUN SERPL-MCNC: 20 MG/DL (ref 7–30)
CALCIUM SERPL-MCNC: 9.5 MG/DL (ref 8.5–10.1)
CHLORIDE SERPL-SCNC: 104 MMOL/L (ref 94–109)
CO2 SERPL-SCNC: 28 MMOL/L (ref 20–32)
COLOR UR AUTO: ABNORMAL
CREAT SERPL-MCNC: 0.57 MG/DL (ref 0.52–1.04)
DIFFERENTIAL METHOD BLD: ABNORMAL
EOSINOPHIL # BLD AUTO: 0.1 10E9/L (ref 0–0.7)
EOSINOPHIL NFR BLD AUTO: 0.7 %
ERYTHROCYTE [DISTWIDTH] IN BLOOD BY AUTOMATED COUNT: 14.6 % (ref 10–15)
GFR SERPL CREATININE-BSD FRML MDRD: 82 ML/MIN/{1.73_M2}
GLUCOSE SERPL-MCNC: 116 MG/DL (ref 70–99)
GLUCOSE UR STRIP-MCNC: NEGATIVE MG/DL
HCT VFR BLD AUTO: 34.3 % (ref 35–47)
HGB BLD-MCNC: 11.2 G/DL (ref 11.7–15.7)
HGB UR QL STRIP: NEGATIVE
IMM GRANULOCYTES # BLD: 0 10E9/L (ref 0–0.4)
IMM GRANULOCYTES NFR BLD: 0.4 %
KETONES UR STRIP-MCNC: NEGATIVE MG/DL
LEUKOCYTE ESTERASE UR QL STRIP: NEGATIVE
LYMPHOCYTES # BLD AUTO: 1 10E9/L (ref 0.8–5.3)
LYMPHOCYTES NFR BLD AUTO: 13.9 %
MCH RBC QN AUTO: 30.7 PG (ref 26.5–33)
MCHC RBC AUTO-ENTMCNC: 32.7 G/DL (ref 31.5–36.5)
MCV RBC AUTO: 94 FL (ref 78–100)
MONOCYTES # BLD AUTO: 0.5 10E9/L (ref 0–1.3)
MONOCYTES NFR BLD AUTO: 6.9 %
MUCOUS THREADS #/AREA URNS LPF: PRESENT /LPF
NEUTROPHILS # BLD AUTO: 5.5 10E9/L (ref 1.6–8.3)
NEUTROPHILS NFR BLD AUTO: 77.7 %
NITRATE UR QL: NEGATIVE
NRBC # BLD AUTO: 0 10*3/UL
NRBC BLD AUTO-RTO: 0 /100
PH UR STRIP: 7 PH (ref 5–7)
PLATELET # BLD AUTO: 230 10E9/L (ref 150–450)
POTASSIUM SERPL-SCNC: 3.2 MMOL/L (ref 3.4–5.3)
RBC # BLD AUTO: 3.65 10E12/L (ref 3.8–5.2)
RBC #/AREA URNS AUTO: 1 /HPF (ref 0–2)
SODIUM SERPL-SCNC: 139 MMOL/L (ref 133–144)
SOURCE: ABNORMAL
SP GR UR STRIP: 1.01 (ref 1–1.03)
SQUAMOUS #/AREA URNS AUTO: <1 /HPF (ref 0–1)
UROBILINOGEN UR STRIP-MCNC: NORMAL MG/DL (ref 0–2)
WBC # BLD AUTO: 7.1 10E9/L (ref 4–11)
WBC #/AREA URNS AUTO: 3 /HPF (ref 0–5)
YEAST #/AREA URNS HPF: ABNORMAL /HPF

## 2019-07-28 PROCEDURE — 85025 COMPLETE CBC W/AUTO DIFF WBC: CPT | Performed by: EMERGENCY MEDICINE

## 2019-07-28 PROCEDURE — 99220 ZZC INITIAL OBSERVATION CARE,LEVL III: CPT | Performed by: INTERNAL MEDICINE

## 2019-07-28 PROCEDURE — 36415 COLL VENOUS BLD VENIPUNCTURE: CPT | Performed by: EMERGENCY MEDICINE

## 2019-07-28 PROCEDURE — 99207 ZZC CDG-CHARGE REQUIRED MANUAL ENTRY: CPT | Performed by: INTERNAL MEDICINE

## 2019-07-28 PROCEDURE — 96375 TX/PRO/DX INJ NEW DRUG ADDON: CPT

## 2019-07-28 PROCEDURE — 96366 THER/PROPH/DIAG IV INF ADDON: CPT

## 2019-07-28 PROCEDURE — 81001 URINALYSIS AUTO W/SCOPE: CPT | Performed by: EMERGENCY MEDICINE

## 2019-07-28 PROCEDURE — 96361 HYDRATE IV INFUSION ADD-ON: CPT

## 2019-07-28 PROCEDURE — 80048 BASIC METABOLIC PNL TOTAL CA: CPT | Performed by: EMERGENCY MEDICINE

## 2019-07-28 PROCEDURE — 99285 EMERGENCY DEPT VISIT HI MDM: CPT | Mod: 25

## 2019-07-28 PROCEDURE — 96365 THER/PROPH/DIAG IV INF INIT: CPT

## 2019-07-28 PROCEDURE — 87040 BLOOD CULTURE FOR BACTERIA: CPT | Performed by: EMERGENCY MEDICINE

## 2019-07-28 PROCEDURE — 87086 URINE CULTURE/COLONY COUNT: CPT | Performed by: EMERGENCY MEDICINE

## 2019-07-28 PROCEDURE — G0378 HOSPITAL OBSERVATION PER HR: HCPCS

## 2019-07-28 PROCEDURE — 25000128 H RX IP 250 OP 636: Performed by: EMERGENCY MEDICINE

## 2019-07-28 PROCEDURE — 25000132 ZZH RX MED GY IP 250 OP 250 PS 637: Mod: GY | Performed by: EMERGENCY MEDICINE

## 2019-07-28 PROCEDURE — 25000132 ZZH RX MED GY IP 250 OP 250 PS 637: Mod: GY | Performed by: INTERNAL MEDICINE

## 2019-07-28 RX ORDER — NITROFURANTOIN 25; 75 MG/1; MG/1
100 CAPSULE ORAL ONCE
Status: COMPLETED | OUTPATIENT
Start: 2019-07-28 | End: 2019-07-28

## 2019-07-28 RX ORDER — POTASSIUM CHLORIDE 29.8 MG/ML
20 INJECTION INTRAVENOUS
Status: DISCONTINUED | OUTPATIENT
Start: 2019-07-28 | End: 2019-08-02 | Stop reason: HOSPADM

## 2019-07-28 RX ORDER — ONDANSETRON 2 MG/ML
4 INJECTION INTRAMUSCULAR; INTRAVENOUS ONCE
Status: COMPLETED | OUTPATIENT
Start: 2019-07-28 | End: 2019-07-28

## 2019-07-28 RX ORDER — POTASSIUM CHLORIDE 1500 MG/1
40 TABLET, EXTENDED RELEASE ORAL ONCE
Status: COMPLETED | OUTPATIENT
Start: 2019-07-28 | End: 2019-07-28

## 2019-07-28 RX ORDER — AMOXICILLIN 250 MG
1 CAPSULE ORAL 2 TIMES DAILY PRN
Status: DISCONTINUED | OUTPATIENT
Start: 2019-07-28 | End: 2019-08-02 | Stop reason: HOSPADM

## 2019-07-28 RX ORDER — NALOXONE HYDROCHLORIDE 0.4 MG/ML
.1-.4 INJECTION, SOLUTION INTRAMUSCULAR; INTRAVENOUS; SUBCUTANEOUS
Status: DISCONTINUED | OUTPATIENT
Start: 2019-07-28 | End: 2019-08-02 | Stop reason: HOSPADM

## 2019-07-28 RX ORDER — POTASSIUM CHLORIDE 1500 MG/1
20-40 TABLET, EXTENDED RELEASE ORAL
Status: DISCONTINUED | OUTPATIENT
Start: 2019-07-28 | End: 2019-08-02 | Stop reason: HOSPADM

## 2019-07-28 RX ORDER — AMLODIPINE BESYLATE 10 MG/1
10 TABLET ORAL DAILY
Status: DISCONTINUED | OUTPATIENT
Start: 2019-07-28 | End: 2019-08-02 | Stop reason: HOSPADM

## 2019-07-28 RX ORDER — NITROFURANTOIN 25; 75 MG/1; MG/1
100 CAPSULE ORAL EVERY 12 HOURS SCHEDULED
Status: DISCONTINUED | OUTPATIENT
Start: 2019-07-28 | End: 2019-07-29

## 2019-07-28 RX ORDER — POTASSIUM CHLORIDE 1.5 G/1.58G
20-40 POWDER, FOR SOLUTION ORAL
Status: DISCONTINUED | OUTPATIENT
Start: 2019-07-28 | End: 2019-08-02 | Stop reason: HOSPADM

## 2019-07-28 RX ORDER — ISOSORBIDE DINITRATE 10 MG/1
10 TABLET ORAL 3 TIMES DAILY
Status: DISCONTINUED | OUTPATIENT
Start: 2019-07-28 | End: 2019-08-02 | Stop reason: HOSPADM

## 2019-07-28 RX ORDER — ACETAMINOPHEN 650 MG/1
650 SUPPOSITORY RECTAL EVERY 4 HOURS PRN
Status: DISCONTINUED | OUTPATIENT
Start: 2019-07-28 | End: 2019-08-02 | Stop reason: HOSPADM

## 2019-07-28 RX ORDER — SERTRALINE HYDROCHLORIDE 100 MG/1
100 TABLET, FILM COATED ORAL DAILY
Status: DISCONTINUED | OUTPATIENT
Start: 2019-07-28 | End: 2019-08-02 | Stop reason: HOSPADM

## 2019-07-28 RX ORDER — GABAPENTIN 100 MG/1
200 CAPSULE ORAL 2 TIMES DAILY
Status: DISCONTINUED | OUTPATIENT
Start: 2019-07-28 | End: 2019-08-02 | Stop reason: HOSPADM

## 2019-07-28 RX ORDER — POLYETHYLENE GLYCOL 3350 17 G/17G
17 POWDER, FOR SOLUTION ORAL DAILY PRN
Status: DISCONTINUED | OUTPATIENT
Start: 2019-07-28 | End: 2019-08-02 | Stop reason: HOSPADM

## 2019-07-28 RX ORDER — METOPROLOL SUCCINATE 100 MG/1
100 TABLET, EXTENDED RELEASE ORAL DAILY
Status: DISCONTINUED | OUTPATIENT
Start: 2019-07-28 | End: 2019-08-02 | Stop reason: HOSPADM

## 2019-07-28 RX ORDER — PROCHLORPERAZINE MALEATE 5 MG
5 TABLET ORAL EVERY 6 HOURS PRN
Status: DISCONTINUED | OUTPATIENT
Start: 2019-07-28 | End: 2019-08-02 | Stop reason: HOSPADM

## 2019-07-28 RX ORDER — POTASSIUM CL/LIDO/0.9 % NACL 10MEQ/0.1L
10 INTRAVENOUS SOLUTION, PIGGYBACK (ML) INTRAVENOUS
Status: DISCONTINUED | OUTPATIENT
Start: 2019-07-28 | End: 2019-08-02 | Stop reason: HOSPADM

## 2019-07-28 RX ORDER — ONDANSETRON 4 MG/1
4 TABLET, ORALLY DISINTEGRATING ORAL EVERY 6 HOURS PRN
Status: DISCONTINUED | OUTPATIENT
Start: 2019-07-28 | End: 2019-08-02 | Stop reason: HOSPADM

## 2019-07-28 RX ORDER — POTASSIUM CHLORIDE 7.45 MG/ML
10 INJECTION INTRAVENOUS
Status: DISCONTINUED | OUTPATIENT
Start: 2019-07-28 | End: 2019-08-02 | Stop reason: HOSPADM

## 2019-07-28 RX ORDER — ERTAPENEM 1 G/1
1 INJECTION, POWDER, LYOPHILIZED, FOR SOLUTION INTRAMUSCULAR; INTRAVENOUS ONCE
Status: COMPLETED | OUTPATIENT
Start: 2019-07-28 | End: 2019-07-28

## 2019-07-28 RX ORDER — ONDANSETRON 2 MG/ML
4 INJECTION INTRAMUSCULAR; INTRAVENOUS EVERY 6 HOURS PRN
Status: DISCONTINUED | OUTPATIENT
Start: 2019-07-28 | End: 2019-08-02 | Stop reason: HOSPADM

## 2019-07-28 RX ORDER — ACETAMINOPHEN 325 MG/1
650 TABLET ORAL EVERY 4 HOURS PRN
Status: DISCONTINUED | OUTPATIENT
Start: 2019-07-28 | End: 2019-08-02 | Stop reason: HOSPADM

## 2019-07-28 RX ORDER — PROCHLORPERAZINE 25 MG
12.5 SUPPOSITORY, RECTAL RECTAL EVERY 12 HOURS PRN
Status: DISCONTINUED | OUTPATIENT
Start: 2019-07-28 | End: 2019-08-02 | Stop reason: HOSPADM

## 2019-07-28 RX ORDER — AMOXICILLIN 250 MG
2 CAPSULE ORAL 2 TIMES DAILY PRN
Status: DISCONTINUED | OUTPATIENT
Start: 2019-07-28 | End: 2019-08-02 | Stop reason: HOSPADM

## 2019-07-28 RX ORDER — CLONIDINE HYDROCHLORIDE 0.1 MG/1
0.1 TABLET ORAL 2 TIMES DAILY
Status: DISCONTINUED | OUTPATIENT
Start: 2019-07-28 | End: 2019-08-02 | Stop reason: HOSPADM

## 2019-07-28 RX ORDER — METOCLOPRAMIDE HYDROCHLORIDE 5 MG/ML
5 INJECTION INTRAMUSCULAR; INTRAVENOUS ONCE
Status: COMPLETED | OUTPATIENT
Start: 2019-07-28 | End: 2019-07-28

## 2019-07-28 RX ADMIN — METOCLOPRAMIDE 5 MG: 5 INJECTION, SOLUTION INTRAMUSCULAR; INTRAVENOUS at 10:22

## 2019-07-28 RX ADMIN — ONDANSETRON HYDROCHLORIDE 4 MG: 2 INJECTION, SOLUTION INTRAMUSCULAR; INTRAVENOUS at 08:09

## 2019-07-28 RX ADMIN — SODIUM CHLORIDE 500 ML: 9 INJECTION, SOLUTION INTRAVENOUS at 10:22

## 2019-07-28 RX ADMIN — AMLODIPINE BESYLATE 10 MG: 10 TABLET ORAL at 12:01

## 2019-07-28 RX ADMIN — ISOSORBIDE DINITRATE 10 MG: 10 TABLET ORAL at 20:03

## 2019-07-28 RX ADMIN — NITROFURANTOIN (MONOHYDRATE/MACROCRYSTALS) 100 MG: 75; 25 CAPSULE ORAL at 11:14

## 2019-07-28 RX ADMIN — SODIUM CHLORIDE 1000 ML: 9 INJECTION, SOLUTION INTRAVENOUS at 08:07

## 2019-07-28 RX ADMIN — POTASSIUM CHLORIDE 40 MEQ: 1500 TABLET, EXTENDED RELEASE ORAL at 09:29

## 2019-07-28 RX ADMIN — CLONIDINE HYDROCHLORIDE 0.1 MG: 0.1 TABLET ORAL at 12:00

## 2019-07-28 RX ADMIN — GABAPENTIN 200 MG: 100 CAPSULE ORAL at 12:00

## 2019-07-28 RX ADMIN — GABAPENTIN 200 MG: 100 CAPSULE ORAL at 20:03

## 2019-07-28 RX ADMIN — ISOSORBIDE DINITRATE 10 MG: 10 TABLET ORAL at 13:34

## 2019-07-28 RX ADMIN — ERTAPENEM SODIUM 1 G: 1 INJECTION, POWDER, LYOPHILIZED, FOR SOLUTION INTRAMUSCULAR; INTRAVENOUS at 09:29

## 2019-07-28 RX ADMIN — ACETAMINOPHEN 650 MG: 325 TABLET, FILM COATED ORAL at 20:20

## 2019-07-28 RX ADMIN — METOPROLOL SUCCINATE 100 MG: 100 TABLET, EXTENDED RELEASE ORAL at 12:01

## 2019-07-28 RX ADMIN — SERTRALINE HYDROCHLORIDE 100 MG: 100 TABLET ORAL at 12:01

## 2019-07-28 RX ADMIN — CLONIDINE HYDROCHLORIDE 0.1 MG: 0.1 TABLET ORAL at 20:03

## 2019-07-28 RX ADMIN — NITROFURANTOIN (MONOHYDRATE/MACROCRYSTALS) 100 MG: 75; 25 CAPSULE ORAL at 20:03

## 2019-07-28 ASSESSMENT — ENCOUNTER SYMPTOMS
DYSURIA: 1
VOMITING: 0
FLANK PAIN: 0
NAUSEA: 1
DIARRHEA: 0
WEAKNESS: 1
ABDOMINAL PAIN: 0
FEVER: 0

## 2019-07-28 NOTE — ED TRIAGE NOTES
Pt reports weakness, urinary burning, and vaginal pain since the middle of the night. Hx of UTI. ABCs intact.

## 2019-07-28 NOTE — PROGRESS NOTES
ROOM # 226    Living Situation (if not independent, order SW consult): [place]  Facility: [facility]    : Gael garcia    Activity level at baseline: ind  Activity level on admit: SBA      Patient registered to observation; given Patient Bill of Rights; given the opportunity to ask questions about observation status and their plan of care.  Patient has been oriented to the observation room, bathroom and call light is in place.    Discussed discharge goals and expectations with patient/family.

## 2019-07-28 NOTE — LETTER
Transition Communication Hand-off for Care Transitions to Next Level of Care Provider    Name: Daya Ignacio  : 10/11/1930  MRN #: [id number]  Primary Care Provider: Marcy Soares  Primary Care MD Name: Dr. Soares  Primary Clinic: [address] [facility] [address]  Primary Care Clinic Name: [facility]  Reason for Hospitalization:  Urinary tract infection due to extended-spectrum beta lactamase (ESBL) producing Escherichia coli [N39.0, B96.29, Z16.12]  Sputum culture positive for ESBL E. coli [R84.5]  Admit Date/Time: 2019  7:26 AM  Discharge Date: 19  Payor Source: Payor: MEDICARE / Plan: MEDICARE / Product Type: Medicare /     Readmission Assessment Measure (EMI) Risk Score/category: Very High          Reason for Communication Hand-off Referral: Fragility    Discharge Plan: [facility]       Concern for non-adherence with plan of care:   No  Discharge Needs Assessment:  Needs      Most Recent Value   Equipment Currently Used at Home  walker, rolling   PAS Number  [id number]          Already enrolled in Tele-monitoring program and name of program:  NA  Follow-up specialty is recommended: No    Follow-up plan:    Future Appointments   Date Time Provider Department Center   2019  2:15 PM Marcy Soares, [facility]       Any outstanding tests or procedures:        Referrals     Future Labs/Procedures    Occupational Therapy Adult Consult     Comments:    Evaluate and treat as clinically indicated.    Reason:  Weakness, deconditioning    Physical Therapy Adult Consult     Comments:    Evaluate and treat as clinically indicated.    Reason:  Weakness, deconditioning                Key Recommendations:  FYI of Hospitalization:  CTS identifies patient as high risk due to elevated EMI score. Currently admitted for ESBL E. Coli UTI, this is her 7th admission in 6 months. She has had 2 ED-only visits in 6 months. Per chart review, pt resides at [facility]. Baseline mobility is  independent. She is currently an assist X 1.      She is independent at [facility]. She uses a cane and rolling walker. She does her own medication administration, cooking and ADLs. She has a private pay  who comes monthly. Her son is supportive & involved. He provides transportation. Daya is aware of post hospitalization follow up within 7 days, she states her son will call to schedule the appointment.     Patient discharged to [facility] TCU. Patient will need a CBC lab check for further monitoring.      Citlaly Sidhu and Isabel Rowland    AVS/Discharge Summary is the source of truth; this is a helpful guide for improved communication of patient story

## 2019-07-28 NOTE — CONSULTS
CTS identifies patient as high risk due to elevated EMI score. Currently admitted for ESBL E. Coli UTI, this is her 7th admission in 6 months. She has had 2 ED-only visits in 6 months. Per chart review, pt resides at [facility] independently. Baseline mobility is independent. She is currently an assist X 1.     She is independent at [facility]. She uses a cane and rolling walker. She does her own medication administration, cooking and ADLs. She has a private pay  who comes monthly. Her son is supportive & involved. He provides transportation. She had a follow up appointment with Dr. Omer for tomorrow, Monday, 7/29/19. This was cancelled. It will be rescheduled once she knows her son's schedule for the next 7-10 days.     Will follow along with SW for DC planning. Will give hand off to clinic RN CTS at time of discharge and assist in making follow up appointments.      Citlaly Sidhu, RN, BSN, CTS  [facility] [place]  [phone]

## 2019-07-28 NOTE — H&P
[facility]  Hospitalist Admission Note  Name: Daya Ignacio    MRN: [id number]  YOB: 1930    Age: 88 year old  Date of admission: 7/28/2019  Primary care provider: Marcy Soares    Chief Complaint:  Dysuria, weakness, nausea    Assessment and Plan:     Daya Ignacio is a 88 year old female with past medical history of hypertension, chronic anemia, lumbar compression fractures with chronic back pain, multiple hospitalizations for recent ESBL UTIs as well as C. difficile and recent likely diverticular GI bleed as well as recent admission for AYAKA (likely due to medications) who was admitted 07/28/19 with dysuria, weakness and nausea with UC from 7/22 showing ESBL UTI.     1. ESBL E coli UTI: Patient was hospitalized here from 7/22-7/23 for nausea which resolved by the time she was admitted.  She had a UA which showed possible UTI but antibiotics were not continued as it was not felt to represent a true infection.  Ultimately urinary culture showed >100K ESBL E coli but unfortunately she did not receive these results and has not been on antibiotics.  She developed nausea, weakness and dysuria.  Here she has no fever or leukocytosis.  Infectious disease was consulted from the ER and recommended a dose of ertapenem followed by Macrobid upon discharge.  Patient and her son were not comfortable with discharge from the emergency room given she is had myalgias with Macrobid in the past.  She was given a dose of Macrobid prior to arrival to the floor.  -Continue oral Macrobid to treat her UTI (does not have a true allergy, this is listed as myalgias which could have been from an underlying infection)    2. Nausea: Patient frequently gets nausea with UTIs.  Will have antiemetics available. Hold on IVF unless she has emesis or truly cannot take PO intake.    3. Chronic back pain: Has chronic back pain.  NSAIDs were stopped during her last admission when she had acute kidney injury.  Her  Neurontin dose was also decreased given her renal function. Continue PTA medications, no worsening of her chronic pain.     4. Recent Cdif: Recent C. difficile infection treated with oral vancomycin (early July).  No recent diarrhea.     5. HTN: PTA on multiple medications including amlodipine 10 mg daily, clonidine 0.1 mg twice daily, metoprolol succinate 100 mg daily and Isordil 10 mg 3 times daily.  Her Lisinopril was discontinued when she had AYAKA.  She is significantly hypertensive at this time but I suspect this is due to not taking her medications today.    -Continue prior to admission amlodipine, clonidine and metoprolol as well as Isordil  -If persistently hypertensive could resume lisinopril as renal function has normalized    6.  Hypokalemia: Replace per protocol.     7. Anemia recent GI bleed: Hemoglobin is 11.3, stable from recent values.  Was significantly lower in the setting of a lower GI bleed recently (mid July).  This was likely diverticular or related to her C. difficile infection.     8. Anxiety: resume PTA sertraline 100 mg daily.    9.  Macular degeneration: Patient follows with a retina specialist at [facility].  She has noticed that her vision has been worsening recently.  Her son is going to call the eye clinic on Monday to get her an appointment to be seen.    Diet: Regular  DVT Prophylaxis: Low Risk/Ambulatory with no VTE prophylaxis indicated  Almanza Catheter: not present  Code Status: Full Code    Disposition Plan   Expected discharge: Tomorrow, recommended to prior living arrangement once antibiotic plan established.  Entered: Amanda Kenyon MD 07/28/2019, 10:34 AM     The patient's care was discussed with the Bedside Nurse, Patient and Patient's Family.    Amanda Kenyon MD  [facility]      History of Present Illness:  Daya Ignacio is a 88 year old female with past medical history of hypertension, chronic anemia, lumbar compression fractures with chronic  back pain, multiple hospitalizations for recent ESBL UTIs as well as C. difficile and recent likely diverticular GI bleed as well as recent admission for AYAKA (likely due to medications) who was admitted 07/28/19 with dysuria, weakness and nausea with UC from 7/22 showing ESBL UTI.   History was obtained through patient interview, chart review and discussion with Dr. Ware in the ER.    Patient has been admitted approximately 6 times in the last 2 months.  Couple episodes of ESBL E. coli UTIs as well as C. difficile and suspected diverticular bleed.  She subsequently had acute kidney injury which has now resolved.  She was most recently admitted from 7/22-7/23 to the observation unit for nausea which resolved by the time she arrived to the unit.  She did have a UA which was not felt to represent an infection at that time.  Subsequently the urine culture did return positive for greater than 100,000 colonies of ESBL E. coli.  She never got the results of this.  She has not been on antibiotics since that time.    Patient states she has been feeling well over the past few days.  Last night she developed nausea, suprapubic pain, vaginal discomfort, dysuria, urinary frequency, poor appetite and diffuse weakness.  Her son cooperates that she had been doing well yesterday without any of the symptoms and has been eating well.  Her symptoms persisted and she came to the emergency room because of this.    She denies fevers but did have chills last night.  She has not had diarrhea or constipation.  No chest pain, cough, dizziness or lightheadedness.  She does have very mild shortness of breath when she walks which she attributes to feeling fatigued and worn out.    The ER physician spoke with Dr. Juan from infectious disease who recommended giving a dose of imipenem and discharging on Macrobid.  The patient has an allergy listed to Macrobid stating myalgias.  The patient and her son do not recall when she had this.  They  were not comfortable going home in case she would have a reaction to the medication.     Past Medical History:  Past Medical History:   Diagnosis Date     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 4/16/2014     Closed fracture of unspecified part of neck of femur      Closed fracture of unspecified part of vertebral column without mention of spinal cord injury     from fall summer 06     Clostridium difficile colitis 06/2019     Hypertension      Lumbar compression fracture (H)      Macular degeneration (senile) of retina, unspecified      Upper GI bleed 06/2019     Urinary tract infection due to extended-spectrum beta lactamase (ESBL)-producing Klebsiella 05/2019    resistant to Bactrim     Past Surgical History:  Past Surgical History:   Procedure Laterality Date     C NONSPECIFIC PROCEDURE      lt hip pain     ESOPHAGOSCOPY, GASTROSCOPY, DUODENOSCOPY (EGD), COMBINED N/A 6/11/2019    Procedure: ESOPHAGOGASTRODUODENOSCOPY (EGD);  Surgeon: Gaurav Degroot MD;  Location:  GI     ORTHOPEDIC SURGERY       Social History:  Social History     Tobacco Use     Smoking status: Never Smoker     Smokeless tobacco: Never Used   Substance Use Topics     Alcohol use: No     Social History     Social History Narrative     Not on file     Family History:  Family History   Problem Relation Age of Onset     Alzheimer Disease Mother         mild     Cardiovascular Father         heart attack     Neurologic Disorder Brother 83        Parkinson's     Allergies:  Allergies   Allergen Reactions     Atorvastatin Muscle Pain (Myalgia)     Macrobid [Nitrofurantoin Anhydrous] Other (See Comments)     Body aches     Sulfa Drugs      Tolerated Bactrim May 2019     Medications:  Medications Prior to Admission   Medication Sig Dispense Refill Last Dose     acetaminophen (TYLENOL) 325 MG tablet Take 2 tablets (650 mg) by mouth every 4 hours as needed for mild pain   Past Week at Unknown time     amLODIPine (NORVASC) 10 MG tablet Take 1  tablet (10 mg) by mouth daily 90 tablet 1 7/27/2019 at am     Calcium Carbonate (CALCIUM 600 PO) Take 1 tablet by mouth 2 times daily    7/27/2019 at pm     Cholecalciferol (VITAMIN D3) 2000 UNITS CAPS Take 2,000 Units by mouth daily (with dinner)   7/27/2019 at pm     cloNIDine (CATAPRES) 0.1 MG tablet TAKE ONE TABLET BY MOUTH TWO TIMES A  tablet 0 7/27/2019 at pm     estradiol (ESTRACE) 0.1 MG/GM vaginal cream Place 2 g vaginally three times a week paraben-free 6 g 3 Past Week at Unknown time     fish oil-omega-3 fatty acids 1000 MG capsule Take 1 g by mouth daily   Past Week at Unknown time     gabapentin (NEURONTIN) 100 MG capsule Take 2 capsules (200 mg) by mouth 2 times daily 120 capsule 0 7/27/2019 at pm     isosorbide dinitrate (ISORDIL) 10 MG tablet Take 1 tablet (10 mg) by mouth 3 times daily 270 tablet 3 7/27/2019 at pm     metoprolol succinate ER (TOPROL-XL) 100 MG 24 hr tablet Take 1 tablet (100 mg) by mouth daily 90 tablet 1 7/27/2019 at am     Multiple Vitamins-Minerals (PRESERVISION AREDS) CAPS Take 1 capsule by mouth 2 times daily    7/27/2019 at pm     sertraline (ZOLOFT) 100 MG tablet Take 1 tablet (100 mg) by mouth daily 90 tablet 1 7/27/2019 at am     vitamin C (ASCORBIC ACID) 500 MG tablet Take 500 mg by mouth daily   7/27/2019 at am     Review of Systems:  A Comprehensive greater than 10 system review of systems was carried out.  Pertinent positives and negatives are noted above.  Otherwise negative for contributory information.     Physical Exam:  Blood pressure 183/75, pulse 67, temperature 97.5  F (36.4  C), resp. rate 20, SpO2 97 %, not currently breastfeeding.  Wt Readings from Last 1 Encounters:   07/22/19 54.5 kg (120 lb 1.6 oz)     Exam:   General: Alert, awake, no acute distress.  HEENT: NC/AT, eyes anicteric and without injection, EOMI, face symmetric.  Dentition WNL, MMM.  Cardiac: RRR, normal S1, S2.  No murmurs/g/r.  No LE edema  Pulmonary: Normal chest rise, normal work of  breathing.  Lungs CTAB  Abdomen: soft, non-tender, non-distended.  Normoactive BS.  No guarding or rebound tenderness.  Extremities: no deformities.  Warm, well perfused.  Skin: no rashes or lesions noted.  Warm and Dry.  Neuro: No focal deficits noted.  Speech clear.  Coordination and strength grossly normal.  Psych: Appropriate affect. Alert and oriented x3    Data Reviewed Today:  Imaging:  No imaging this admission  Labs:  Recent Labs   Lab 07/28/19  0801 07/23/19  0632 07/22/19  1014   WBC 7.1 6.1 5.7   HGB 11.2* 10.4* 10.0*   HCT 34.3* 31.9* 30.3*   MCV 94 94 92    207 162     Recent Labs   Lab 07/28/19  0801 07/23/19  0632 07/22/19  1355 07/22/19  1014    138  --  138   POTASSIUM 3.2* 4.3 3.3* 2.9*   CHLORIDE 104 106  --  102   CO2 28 26  --  30   ANIONGAP 7 6  --  6   * 87  --  136*   BUN 20 18  --  17   CR 0.57 0.84  --  0.84   GFRESTIMATED 82 62  --  61   GFRESTBLACK >90 72  --  71   DAVE 9.5 9.0  --  8.7       Amanda Kenyon MD  Hospitalist  [facility]

## 2019-07-28 NOTE — PLAN OF CARE
"PRIMARY DIAGNOSIS: UTI  OUTPATIENT/OBSERVATION GOALS TO BE MET BEFORE DISCHARGE:  ADLs back to baseline: Yes    Activity and level of assistance: Up with standby assistance.    Pain status: currently denies - states that she does have chronic back pain and dysuria     Return to near baseline physical activity: Yes     Discharge Planner Nurse   Safe discharge environment identified: Yes  Barriers to discharge: No       Entered by: Jackie King 07/28/2019 12:26 PM     Please review provider order for any additional goals.   Nurse to notify provider when observation goals have been met and patient is ready for discharge.    /78 (BP Location: Left arm)   Pulse 75   Temp 97.2  F (36.2  C) (Oral)   Resp 18   Ht 1.549 m (5' 1\")   Wt 52.6 kg (116 lb)   SpO2 98%   BMI 21.92 kg/m      BP elevated on admission - pt reported she did not take any of her BP med today. All meds administered and will recheck BP    Orientation: A&Ox4  Neurological: WNL  Pain status: denies  Activity: SBA via gait belt and walker  Peripheral neurovascular: WNL  Resp: WNL  Lungs: clear  Cardiac: WNL  GI: WNL  : reports dysuria and frequency, denies hematuria  Skin: WNL  IVF: PIV SL, flushed, patent  Meds: scheduled meds given  Labs/imaging: UC pending  Diet: regular  Plan: pain control, PO macrobid for UTI, continue contact precautions for ESBL, monitor VS  "

## 2019-07-28 NOTE — PHARMACY-ADMISSION MEDICATION HISTORY
Admission medication history interview status for this patient is complete. See [facility] admission navigator for allergy information, prior to admission medications and immunization status.     Medication history interview source(s):Patient and sonGael.  Medication history resources (including written lists, pill bottles, clinic record): SureScripts, Care Everywhere and patient medication list provided by Gael garcia  Primary pharmacy: [facility] [address] [place] [address]    Changes made to PTA medication list:  Added: None   Deleted: Oxycodone and Senna  Changed: None    Actions taken by pharmacist (provider contacted, etc):None     Additional medication history information: Patient's son provided medication history. Daya has not had her morning medications today.    Medication reconciliation/reorder completed by provider prior to medication history? Yes    Do you take OTC medications (eg tylenol, ibuprofen, fish oil, eye/ear drops, etc)? Yes, see list below.    Prior to Admission medications    Medication Sig Last Dose Taking? Auth Provider   acetaminophen (TYLENOL) 325 MG tablet Take 2 tablets (650 mg) by mouth every 4 hours as needed for mild pain Past Week at Unknown time Yes Juliano Mckeon MD   amLODIPine (NORVASC) 10 MG tablet Take 1 tablet (10 mg) by mouth daily 7/27/2019 at am Yes Marcy Soares PA-C   Calcium Carbonate (CALCIUM 600 PO) Take 1 tablet by mouth 2 times daily  7/27/2019 at pm Yes Reported, Patient   Cholecalciferol (VITAMIN D3) 2000 UNITS CAPS Take 2,000 Units by mouth daily (with dinner) 7/27/2019 at pm Yes Unknown, Entered By History   cloNIDine (CATAPRES) 0.1 MG tablet TAKE ONE TABLET BY MOUTH TWO TIMES A DAY 7/27/2019 at pm Yes Marcy Soares PA-C   estradiol (ESTRACE) 0.1 MG/GM vaginal cream Place 2 g vaginally three times a week paraben-free Past Week at Unknown time Yes Marcy Soares PA-C   fish oil-omega-3 fatty acids 1000 MG capsule Take 1 g by  mouth daily Past Week at Unknown time Yes Unknown, Entered By History   gabapentin (NEURONTIN) 100 MG capsule Take 2 capsules (200 mg) by mouth 2 times daily 7/27/2019 at pm Yes Amanda Kenyon MD   isosorbide dinitrate (ISORDIL) 10 MG tablet Take 1 tablet (10 mg) by mouth 3 times daily 7/27/2019 at pm Yes Marcy Soares PA-C   metoprolol succinate ER (TOPROL-XL) 100 MG 24 hr tablet Take 1 tablet (100 mg) by mouth daily 7/27/2019 at am Yes Marcy Soares PA-C   Multiple Vitamins-Minerals (PRESERVISION AREDS) CAPS Take 1 capsule by mouth 2 times daily  7/27/2019 at pm Yes Unknown, Entered By History   sertraline (ZOLOFT) 100 MG tablet Take 1 tablet (100 mg) by mouth daily 7/27/2019 at am Yes Marcy Soares PA-C   vitamin C (ASCORBIC ACID) 500 MG tablet Take 500 mg by mouth daily 7/27/2019 at am Yes Reported, Patient

## 2019-07-28 NOTE — ED PROVIDER NOTES
"  History     Chief Complaint:  Dysuria    The history is provided by the patient, a relative and medical records.      Daya Ignacio is an 88 year old female who lives independently in a \"senior apartment\" with HTN who has been hospitalized with complicated UTIs several times over the last couple months. She was most recently admitted from 7/22/2019 to 7/23/2019 for nausea, hypokalemia, and possible L2 compression fracture. She had an abnormal UA but not discharged with antibiotics as urine culture was pending. She was feeling well until she awoke \"in the middle of the night\" with \"vaginal pain,\" nausea without vomiting, and dysuria. She states she feels weak. Her son is at bedside and states she has a follow up appointment with primary care tomorrow \"to find out the results of the urine culture.\"    Notably, patient has a recent history of C. Difficile colitis. She has had no diarrhea or fever. She has no abdominal or flank pain.      Urine Culture 7/22/2019  >100,000 colonies/mL Escherichia coli ESBL  AMIKACIN Sensitive ALFRED Final   AMPICILLIN Resistant ALFRED Final   AMPICILLIN/SULBACTAM Intermediate ALFRED Final   CEFAZOLIN Resistant ALFRED Final    Cefazolin ALFRED breakpoints are for the treatment of uncomplicated urinary tract   infections.  For the treatment of systemic infections, please contact the   laboratory for additional testing.   CEFEPIME Resistant ALFRED Final   CEFOXITIN Sensitive ALFRED Final   CEFTAZIDIME Resistant ALFRED Final   CEFTRIAXONE Resistant ALFRED Final   CIPROFLOXACIN Resistant ALFRED Final   GENTAMICIN Sensitive ALFRED Final   LEVOFLOXACIN Resistant ALFRED Final   MEROPENEM Sensitive ALFRED Final    Enterobacteriaceae that are susceptible to meropenem are usually susceptible   to ertapenem.   NITROFURANTOIN Sensitive ALFRED Final   Piperacillin/Tazo Sensitive ALFRED Final   TOBRAMYCIN Sensitive ALFRED Final   Trimethoprim/Sulfa Resistant ALFRED Final     AMIKACIN Sensitive ALFRED Final   AMPICILLIN Resistant ALFRED Final "   AMPICILLIN/SULBACTAM Intermediate ALFRED Final   CEFAZOLIN Resistant ALFRED Final    Cefazolin ALFRED breakpoints are for the treatment of uncomplicated urinary tract   infections.  For the treatment of systemic infections, please contact the   laboratory for additional testing.   CEFEPIME Resistant ALFRED Final   CEFOXITIN Sensitive ALFRED Final   CEFTAZIDIME Resistant ALFRED Final   CEFTRIAXONE Resistant ALFRED Final   CIPROFLOXACIN Resistant ALFRED Final   GENTAMICIN Sensitive ALFRED Final   LEVOFLOXACIN Resistant ALFRED Final   MEROPENEM Sensitive ALFRED Final    Enterobacteriaceae that are susceptible to meropenem are usually susceptible   to ertapenem.   NITROFURANTOIN Sensitive ALFRED Final   Piperacillin/Tazo Sensitive ALFRED Final   TOBRAMYCIN Sensitive ALFRED Final   Trimethoprim/Sulfa Resistant ALFRED Final     Allergies:  Atorvastatin  Macrobid [Nitrofurantoin Anhydrous]  Sulfa Drugs     Medications:    Amlodipine   Clonidine   Estradiol   Isosorbide dinitrate  Metoprolol  Senokot   Sertraline     Past Medical History:    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  Closed fracture of unspecified part of neck of femur   Closed fracture of unspecified part of vertebral column without mention of spinal cord injury  Clostridium difficile colitis   Hypertension   Lumbar compression fracture    Macular degeneration (senile) of retina, unspecified   Upper GI bleed  Urinary tract infection due to extended-spectrum beta lactamase (ESBL)-producing Klebsiella     Past Surgical History:    Orthopedic surgery      Family History:    Mother: Alzheimer disease  Father: Cardiovascular   Brother: Parkinson's     Social History:  The patient was accompanied to the ED by her son.  Lives alone.  Smoking Status: Never Smoker  Smokeless Tobacco: Never Used  Drug use: Negative  Alcohol Use: Negative   Marital Status:  Single     Review of Systems   Constitutional: Negative for fever.   Gastrointestinal: Positive for nausea. Negative for abdominal pain, diarrhea and  vomiting.   Genitourinary: Positive for dysuria and vaginal pain. Negative for flank pain.   Neurological: Positive for weakness (generalized).   All other systems reviewed and are negative.    Physical Exam     Patient Vitals for the past 24 hrs:   BP Temp Pulse Resp SpO2   07/28/19 0825 -- -- -- -- 97 %   07/28/19 0820 -- -- -- -- 97 %   07/28/19 0815 183/75 -- 67 -- 95 %   07/28/19 0810 183/75 -- -- -- 97 %   07/28/19 0805 -- -- -- -- 98 %   07/28/19 0800 -- -- -- -- 96 %   07/28/19 0755 -- -- -- -- 96 %   07/28/19 0750 -- -- -- -- 95 %   07/28/19 0745 192/88 -- 73 -- 97 %   07/28/19 0744 192/88 97.5  F (36.4  C) 75 20 99 %   07/28/19 0735 -- -- -- -- 99 %   07/28/19 0730 (!) 201/99 -- 80 -- 98 %      Physical Exam  General: Well-developed and well-nourished. Well appearing elderly  woman. Cooperative.  Head:  Atraumatic.  Eyes:  Conjunctivae, lids, and sclerae are normal.  ENT:    Normal nose. Moist mucous membranes.  Neck:  Supple. Normal range of motion.  CV:  Regular rate and rhythm. Normal heart sounds with no murmurs, rubs, or gallops detected.  Resp:  No respiratory distress. Clear to auscultation bilaterally without decreased breath sounds, wheezing, rales, or rhonchi.  GI:  Soft. Non-distended. Non-tender.   :  Normal external female genitalia without obvious irritation.  No significant pain or masses felt with superficial digital exam of the vagina.   MS:  Normal ROM.   Skin:  Warm. Non-diaphoretic. No pallor.  Neuro:  Awake. A&Ox3. Normal strength.  Steady gait.  Psych: Normal mood and affect. Normal speech.  Vitals reviewed.    Emergency Department Course     Laboratory:  Laboratory findings were communicated with the patient and her son who voiced understanding of the findings.    CBC: WBC 7.1, HGB 11.2 (L),   BMP: Potassium 3.2 (L), Glucose 116 (H) o/w WNL (Creatinine 0.57)  Blood Culture: Pending    UA with micro: Protein Albumin 10, Yeast few, Mucous present o/w negative   Urine  "Culture: Pending    Interventions:  0807 NS 1000 mL IV  0809 Zofran 4 mg IV  0929 Potassium chloride 40 mEq PO  0929 Ertapenem 1 g IV  Macrobid 100 mg PO  Reglan 5 mg IV  NS bolus 500 mL IV     Emergency Department Course:    0727 Nursing notes and vitals reviewed.    0730 I performed an exam of the patient as documented above.     0801 IV was inserted and blood was drawn for laboratory testing, results above.     0841 The patient provided a urine sample here in the emergency department. This was sent for laboratory testing, findings above.     0857 I spoke with Dr. Juan of the infectious disease service regarding patient's presentation, findings, and plan of care.      1013 Patient rechecked and updated.  The patient does not feel much improved, still nauseated. She does not feel well enough for discharge. Her son feels it would be \"unsafe.\"    1031 I spoke with Dr. Kenyon of the hospitalist service regarding patient's presentation, findings, and plan of care.      1118 I personally discussed the laboratory results with the patient and answered all related questions prior to admission    Impression & Plan      Medical Decision Making:  Daya is an 88 year old woman who has frequently been admitted to the hospital for recurrent complicated UTIs over the last couple of months.  Indeed, she was just discharged 5 days ago though she was not discharged on antibiotics.  In the interim her urine culture has resulted with ESBL E. coli.  She did not have the results of this culture but called her son and presents today due to generalized weakness, dysuria, nausea, and \"vaginal pain.\"  Her exam is unremarkable including a superficial examination of her genitalia.  She is afebrile and has no leukocytosis.  Practicing with an abundance of caution I did repeat the urinalysis and urine culture although there is no evidence of infection on this UA including no pyuria.  I also sent blood cultures.  She does not have leukocytosis " "or kidney injury and potassium of 3.2 was repleted with 40 mEq of potassium chloride by mouth.  Hemoglobin of 11.2 is at/above baseline.  There is no role for imaging as she describes no flank or abdominal pain and has no tenderness.      Given her complicated history I did discuss her case with infectious disease, Dr. Juan.  He recommends treatment with Macrobid as her creatinine is normal, and this is sensitive as per culture results, though he agrees with a single dose of ertapenem prior to discharge and follow-up with infectious disease. He does not recommend prophylaxis for C. Difficile (recent history of). Patient was given the ertapenem as well as IV fluids.  She was also given Zofran and had no improvement in her nausea upon repeat evaluation and her and her son do not feel comfortable with plan for discharge.  They state many reasons primarily as she \"does not feel well\" with persistent nausea and son is concerned as she has a history of myalgia with Macrobid (Daya does not remember it, but it is documented in her chart) which he feels would increase her risk for fall should this recur.  However, I assured him that this is not a true allergy and not a reason to avoid Macrobid.  Ultimately, they were amenable to a plan for short observation stay and patient was given further IV fluids and Reglan for persistent nausea.  I answered all their questions and they verbalized understanding.  Amenable to plan.  I discussed patient's case with Dr. Kenyon, hospitalist, who accepts admission.  She agrees with plan to initiate Macrobid and first dose was provided in the emergency department.  Patient was transferred to the floor in stable condition.    Diagnosis:    ICD-10-CM    1. Urinary tract infection due to extended-spectrum beta lactamase (ESBL) producing Escherichia coli N39.0     B96.29     Z16.12      Disposition:   The patient is admitted into the care of Dr. Kenyon.     Scribe Disclosure:  I, Gisela Kothari, " am serving as a scribe at 7:27 AM on 7/28/2019 to document services personally performed by Jennifer Ware MD based on my observations and the provider's statements to me.        [facility] EMERGENCY DEPARTMENT       Jennifer Ware MD  07/28/19 1952

## 2019-07-28 NOTE — ED NOTES
RECEIVING UNIT ED HANDOFF REVIEW    Above ED Nurse Handoff Report was reviewed: Yes  Reviewed by: Jackie King on July 28, 2019 at 11:12 AM       [facility]  ED Nurse Handoff Report    Daya Igncaio is a 88 year old female   ED Chief complaint: Dysuria  . ED Diagnosis:   Final diagnoses:   Sputum culture positive for ESBL E. coli     Allergies:   Allergies   Allergen Reactions     Atorvastatin Muscle Pain (Myalgia)     Macrobid [Nitrofurantoin Anhydrous] Other (See Comments)     Body aches     Sulfa Drugs      Tolerated Bactrim May 2019       Code Status: Full Code  Activity level - Baseline/Home:  Independent. Activity Level - Current:   Assist X 1. Lift room needed: No. Bariatric: No   Needed: No   Isolation:yes  Infection: ESBL. Hx of cdiff. Formed stool currently     Vital Signs:   Vitals:    07/28/19 0810 07/28/19 0815 07/28/19 0820 07/28/19 0825   BP: 183/75 183/75     Pulse:  67     Resp:       Temp:       SpO2: 97% 95% 97% 97%       Cardiac Rhythm:  ,      Pain level:    Patient confused: No. Patient Falls Risk: Yes.   Elimination Status: Has voided   Patient Report - Initial Complaint: vaginal discomfort and weakness. Focused Assessment: vaginal pain. Dif. Voiding. weakness   Tests Performed: labs ua. Abnormal Results:   Labs Ordered and Resulted from Time of ED Arrival Up to the Time of Departure from the ED   ROUTINE UA WITH MICROSCOPIC - Abnormal; Notable for the following components:       Result Value    Protein Albumin Urine 10 (*)     Yeast Urine Few (*)     Mucous Urine Present (*)     Amorphous Crystals Few (*)     All other components within normal limits   BASIC METABOLIC PANEL - Abnormal; Notable for the following components:    Potassium 3.2 (*)     Glucose 116 (*)     All other components within normal limits   CBC WITH PLATELETS DIFFERENTIAL - Abnormal; Notable for the following components:    RBC Count 3.65 (*)     Hemoglobin 11.2 (*)     Hematocrit 34.3 (*)      All other components within normal limits   PERIPHERAL IV CATHETER   ISTAT CG4 GASES LACTATE THERESA NURSING POCT   URINE CULTURE AEROBIC BACTERIAL   BLOOD CULTURE   BLOOD CULTURE     .   Treatments provided: ivf antibiotics zofran and reglan   Family Comments: son is at the bedside very supportive.   OBS brochure/video discussed/provided to patient:  No pt declined has seen before   ED Medications:   Medications   0.9% sodium chloride BOLUS (500 mLs Intravenous New Bag 7/28/19 1022)   nitroFURantoin macrocrystal-monohydrate (MACROBID) capsule 100 mg (has no administration in time range)   0.9% sodium chloride BOLUS (0 mLs Intravenous Stopped 7/28/19 0900)   ondansetron (ZOFRAN) injection 4 mg (4 mg Intravenous Given 7/28/19 0809)   potassium chloride ER (K-DUR/KLOR-CON M) CR tablet 40 mEq (40 mEq Oral Given 7/28/19 0929)   ertapenem (INVanz) 1 g vial to attach to  mL bag (1 g Intravenous New Bag 7/28/19 0929)   metoclopramide (REGLAN) injection 5 mg (5 mg Intravenous Given 7/28/19 1022)     Drips infusing:  No  For the majority of the shift, the patient's behavior Green. Interventions performed were ivf and meds.     Severe Sepsis OR Septic Shock Diagnosis Present: No      ED Nurse Name/Phone Number: Alvaro Mortensen,   10:44 AM

## 2019-07-28 NOTE — PLAN OF CARE
"PRIMARY DIAGNOSIS: UTI  OUTPATIENT/OBSERVATION GOALS TO BE MET BEFORE DISCHARGE:  1. ADLs back to baseline: Yes    2. Activity and level of assistance: Up with standby assistance.    3. Pain status: currently denies - states that she does have chronic back pain and dysuria     4. Return to near baseline physical activity: Yes     Discharge Planner Nurse   Safe discharge environment identified: Yes  Barriers to discharge: No       Entered by: Jackie King 07/28/2019 5:05 PM     Please review provider order for any additional goals.   Nurse to notify provider when observation goals have been met and patient is ready for discharge.    /46 (BP Location: Left arm)   Pulse 67   Temp 97.5  F (36.4  C) (Oral)   Resp 16   Ht 1.549 m (5' 1\")   Wt 52.6 kg (116 lb)   SpO2 97%   BMI 21.92 kg/m      Pt resting in bed, voiding adequately, tolerating diet.  Plan: pain control, PO macrobid for UTI, continue contact precautions for ESBL  "

## 2019-07-28 NOTE — ED NOTES
Bed: ED12  Expected date: 7/28/19  Expected time: 7:19 AM  Means of arrival: Ambulance  Comments:  [id number]  87yo F UTI

## 2019-07-29 PROBLEM — N39.0 SEPSIS SECONDARY TO UTI (H): Status: ACTIVE | Noted: 2019-07-29

## 2019-07-29 PROBLEM — A41.9 SEPSIS SECONDARY TO UTI (H): Status: ACTIVE | Noted: 2019-07-29

## 2019-07-29 LAB
ANION GAP SERPL CALCULATED.3IONS-SCNC: 7 MMOL/L (ref 3–14)
BACTERIA SPEC CULT: NO GROWTH
BUN SERPL-MCNC: 20 MG/DL (ref 7–30)
CALCIUM SERPL-MCNC: 9.2 MG/DL (ref 8.5–10.1)
CHLORIDE SERPL-SCNC: 106 MMOL/L (ref 94–109)
CO2 SERPL-SCNC: 26 MMOL/L (ref 20–32)
CREAT SERPL-MCNC: 0.75 MG/DL (ref 0.52–1.04)
ERYTHROCYTE [DISTWIDTH] IN BLOOD BY AUTOMATED COUNT: 14.9 % (ref 10–15)
GFR SERPL CREATININE-BSD FRML MDRD: 71 ML/MIN/{1.73_M2}
GLUCOSE BLDC GLUCOMTR-MCNC: 89 MG/DL (ref 70–99)
GLUCOSE SERPL-MCNC: 121 MG/DL (ref 70–99)
HCT VFR BLD AUTO: 32.3 % (ref 35–47)
HGB BLD-MCNC: 10.2 G/DL (ref 11.7–15.7)
LACTATE BLD-SCNC: 1.8 MMOL/L (ref 0.7–2)
Lab: NORMAL
MCH RBC QN AUTO: 30.5 PG (ref 26.5–33)
MCHC RBC AUTO-ENTMCNC: 31.6 G/DL (ref 31.5–36.5)
MCV RBC AUTO: 97 FL (ref 78–100)
PLATELET # BLD AUTO: 177 10E9/L (ref 150–450)
POTASSIUM SERPL-SCNC: 3.7 MMOL/L (ref 3.4–5.3)
RBC # BLD AUTO: 3.34 10E12/L (ref 3.8–5.2)
SODIUM SERPL-SCNC: 139 MMOL/L (ref 133–144)
SPECIMEN SOURCE: NORMAL
WBC # BLD AUTO: 11.3 10E9/L (ref 4–11)

## 2019-07-29 PROCEDURE — 83605 ASSAY OF LACTIC ACID: CPT | Performed by: HOSPITALIST

## 2019-07-29 PROCEDURE — 25000128 H RX IP 250 OP 636: Performed by: HOSPITALIST

## 2019-07-29 PROCEDURE — 85027 COMPLETE CBC AUTOMATED: CPT | Performed by: INTERNAL MEDICINE

## 2019-07-29 PROCEDURE — 00000146 ZZHCL STATISTIC GLUCOSE BY METER IP

## 2019-07-29 PROCEDURE — 25000132 ZZH RX MED GY IP 250 OP 250 PS 637: Mod: GY | Performed by: HOSPITALIST

## 2019-07-29 PROCEDURE — G0378 HOSPITAL OBSERVATION PER HR: HCPCS

## 2019-07-29 PROCEDURE — 80048 BASIC METABOLIC PNL TOTAL CA: CPT | Performed by: INTERNAL MEDICINE

## 2019-07-29 PROCEDURE — 25000132 ZZH RX MED GY IP 250 OP 250 PS 637: Mod: GY | Performed by: INTERNAL MEDICINE

## 2019-07-29 PROCEDURE — 12000011 ZZH R&B MS OVERFLOW

## 2019-07-29 PROCEDURE — 36415 COLL VENOUS BLD VENIPUNCTURE: CPT | Performed by: HOSPITALIST

## 2019-07-29 PROCEDURE — 25000128 H RX IP 250 OP 636: Performed by: INTERNAL MEDICINE

## 2019-07-29 PROCEDURE — 36415 COLL VENOUS BLD VENIPUNCTURE: CPT | Performed by: INTERNAL MEDICINE

## 2019-07-29 PROCEDURE — 99233 SBSQ HOSP IP/OBS HIGH 50: CPT | Performed by: HOSPITALIST

## 2019-07-29 RX ORDER — VANCOMYCIN HYDROCHLORIDE 50 MG/ML
125 KIT ORAL 4 TIMES DAILY
Status: DISCONTINUED | OUTPATIENT
Start: 2019-07-29 | End: 2019-07-30

## 2019-07-29 RX ORDER — MEROPENEM 1 G/1
1 INJECTION, POWDER, FOR SOLUTION INTRAVENOUS EVERY 12 HOURS
Status: DISCONTINUED | OUTPATIENT
Start: 2019-07-29 | End: 2019-07-30

## 2019-07-29 RX ORDER — SODIUM CHLORIDE AND POTASSIUM CHLORIDE 150; 900 MG/100ML; MG/100ML
INJECTION, SOLUTION INTRAVENOUS CONTINUOUS
Status: DISCONTINUED | OUTPATIENT
Start: 2019-07-29 | End: 2019-07-30

## 2019-07-29 RX ADMIN — NITROFURANTOIN (MONOHYDRATE/MACROCRYSTALS) 100 MG: 75; 25 CAPSULE ORAL at 07:57

## 2019-07-29 RX ADMIN — VANCOMYCIN HYDROCHLORIDE 125 MG: KIT at 15:54

## 2019-07-29 RX ADMIN — VANCOMYCIN HYDROCHLORIDE 125 MG: KIT at 11:23

## 2019-07-29 RX ADMIN — AMLODIPINE BESYLATE 10 MG: 10 TABLET ORAL at 07:57

## 2019-07-29 RX ADMIN — ISOSORBIDE DINITRATE 10 MG: 10 TABLET ORAL at 20:44

## 2019-07-29 RX ADMIN — MEROPENEM 1 G: 1 INJECTION, POWDER, FOR SOLUTION INTRAVENOUS at 09:46

## 2019-07-29 RX ADMIN — ENOXAPARIN SODIUM 40 MG: 40 INJECTION SUBCUTANEOUS at 11:23

## 2019-07-29 RX ADMIN — ONDANSETRON HYDROCHLORIDE 4 MG: 2 INJECTION, SOLUTION INTRAMUSCULAR; INTRAVENOUS at 13:57

## 2019-07-29 RX ADMIN — CLONIDINE HYDROCHLORIDE 0.1 MG: 0.1 TABLET ORAL at 20:44

## 2019-07-29 RX ADMIN — VANCOMYCIN HYDROCHLORIDE 125 MG: KIT at 20:45

## 2019-07-29 RX ADMIN — GABAPENTIN 200 MG: 100 CAPSULE ORAL at 07:57

## 2019-07-29 RX ADMIN — CLONIDINE HYDROCHLORIDE 0.1 MG: 0.1 TABLET ORAL at 07:57

## 2019-07-29 RX ADMIN — ISOSORBIDE DINITRATE 10 MG: 10 TABLET ORAL at 13:21

## 2019-07-29 RX ADMIN — POTASSIUM CHLORIDE AND SODIUM CHLORIDE: 900; 150 INJECTION, SOLUTION INTRAVENOUS at 14:54

## 2019-07-29 RX ADMIN — METOPROLOL SUCCINATE 100 MG: 100 TABLET, EXTENDED RELEASE ORAL at 07:58

## 2019-07-29 RX ADMIN — SERTRALINE HYDROCHLORIDE 100 MG: 100 TABLET ORAL at 07:58

## 2019-07-29 RX ADMIN — GABAPENTIN 200 MG: 100 CAPSULE ORAL at 20:44

## 2019-07-29 RX ADMIN — ACETAMINOPHEN 650 MG: 325 TABLET, FILM COATED ORAL at 07:57

## 2019-07-29 RX ADMIN — ISOSORBIDE DINITRATE 10 MG: 10 TABLET ORAL at 07:57

## 2019-07-29 RX ADMIN — MEROPENEM 1 G: 1 INJECTION, POWDER, FOR SOLUTION INTRAVENOUS at 21:46

## 2019-07-29 NOTE — PROGRESS NOTES
Pt admitted to inpatient status. Plan to continue pain control, PO macrobid discontinued and IV meropenem started. Lactate 1.8.  Continue to monitor VS and assist and encourage ambulation and PO intake.

## 2019-07-29 NOTE — PLAN OF CARE
PRIMARY DIAGNOSIS: PYELONEPHRITIS  OUTPATIENT/OBSERVATION GOALS TO BE MET BEFORE DISCHARGE:  Diagnostic test and consults (if applicable) complete: Yes    Vitals signs stable or return to baseline: No    Tolerate oral intake to maintain hydration: Yes    Pain status: Improved-controlled with oral pain medications.    Tolerating oral antibiotics or has plans for home infusion setup:  Yes    Return to near baseline physical activity: Yes    Discharge Planner Nurse   Safe discharge environment identified: Yes  Barriers to discharge: No       Entered by: Austin Valenzuela 07/29/2019 4:31 AM   Afebrile.  Vital signs stable.  Pain improved after Tylenol.  Voiding frequently.  Monitor and manage any pain or temp overnight.

## 2019-07-29 NOTE — PROGRESS NOTES
Infection Prevention:    Patient requires Contact precautions because of ESBL: urine, 2019. Please contact Infection Prevention with any questions/concerns at *[phone].    Annette Fatima, ICP

## 2019-07-29 NOTE — PROGRESS NOTES
[facility]  Patient is currently open to home care services with [facility].  The patient is currently receiving RN/PT services.  [facility]  and team have been notified of patient admission.  [facility] liaison will continue to follow patient during stay.  If appropriate provide orders to resume home care at time of discharge.

## 2019-07-29 NOTE — CONSULTS
Consult Date:  07/29/2019      INFECTIOUS DISEASE CONSULTATION      REQUESTING PHYSICIAN:  Juliano Mckeon MD.      REASON FOR CONSULTATION:  I was asked by Dr. Mckeon to assess for possible recurrent urinary tract infection.      IMPRESSION:   1.  An 88-year-old female with a complicated recent past medical history including multiple treatments for ESBL E. coli urinary tract infection.  She was seen by Dr. Camejo in 07/2019.  At that time she had been diagnosed with ESBL UTI, sensitive to Septra, with which she was treated.  She then returned with a sulfa resistant strain of the ESBL E. coli.  At that time she received a course of IV meropenem.     2.  Earlier this month, patient again was treated with meropenem for fewer than 10 to the 5th colonies of ESBL E. coli.     3.  The patient was readmitted 07/22 for nausea and vomiting.  At that time, urinalysis showed 31 white blood cells and urine culture grew greater than 100,000 colonies of ESBL E. coli.  This was not treated.     4.  The patient returns to the hospital on this occasion with complaint of vaginal pain, dysuria, weakness and nausea.  Her maximal temperature has been 100.4 degrees and white blood count was elevated to 11,300.  I am not convinced that the patient's symptoms at present are secondary to urinary tract infection as her current urinalysis shows 3 white blood cells, 1 red blood cell, and urine and blood cultures are negative thus far.  It is possible that the patient may be colonized with ESBL E. coli and that her symptoms are explained by something else.  The patient did have renal ultrasound done about 2 weeks ago which did not show any perinephric abscess.   5.  Previous history of Clostridium difficile colitis.   6.  History of coronary artery disease.   7.  Chronic low back pain.      RECOMMENDATIONS:   1.  Continue IV meropenem for the present.   2.  Will follow up on blood and urine cultures.   3.  If urine culture is negative and patient  continues to be symptomatic, we may need to expand the evaluation to consider other infectious or noninfectious etiologies.    4.  Continue prophylactic oral vancomycin for C. difficile.        HISTORY OF PRESENT ILLNESS:  This is an 88-year-old female with a somewhat puzzling a recent medical history.  The patient was seen by Dr. Camejo in June of this year, at which time she had recently been hospitalized with vague symptoms and was found to have ESBL UTI.  Her organism was sensitive to sulfa and she was treated with the same.  She had a negative urologic workup but was rehospitalized and again found to have ESBL E. coli on urine culture, but now resistant to sulfa.  She was given a course of meropenem by Dr. Camejo.  From 07/04-07/07 she again received a course of meropenem for urine culture which grew ESBL E. coli and a urinalysis showing 51 white blood cells.  The patient was hospitalized again 07/22 for nausea.  At that time, urinalysis showed 31 white blood cells and 2 red blood cells.  She was apparently discharged before urine culture returned and showed an ESBL E. coli.  The patient returned to the hospital today because of generalized weakness to the point where it was difficult for her to walk.  She described vaginal pain, both with urination and while at rest.  Urinalysis on this admission shows 3 white blood cells and 1 red blood cell, and urine and blood cultures are negative to date.  The patient did have an episode of Clostridium difficile colitis during this time which was successfully treated with oral vancomycin.  Her maximal temperature on this occasion has been 100.4 degrees.  White blood count was normal initially, but elevated to 11,000 today.  The patient denies diarrhea, cough or other new symptoms.      PAST MEDICAL HISTORY:   1.  ESBL Escherichia coli urinary tract infection.   2.  Clostridium difficile colitis.   3.  Coronary artery disease.   4.  Hypertension.   5.  Congestive heart  failure.   6.  Pulmonary hypertension.   7.  Depression.   8.  Neuropathy.   9.  Chronic anemia.      ALLERGIES:  LISTED TO MACROBID AND SULFA, though she has tolerated SULFA previously.      FAMILY HISTORY:  Noncontributory.      REVIEW OF SYSTEMS:  Negative other than that described above.      PHYSICAL EXAMINATION:   VITAL SIGNS:  Temperature 96.2, blood pressure 96/41, heart rate 66 and regular.   GENERAL:  Well-developed, well-nourished, alert and oriented, does not look acutely ill.   SKIN:  No rashes or nodules.   HEENT:  Eyes:  No subconjunctival hemorrhages or scleral icterus.  Oropharynx without erythema or exudate.   NECK:  Supple, no thyromegaly.   LYMPH:  No cervical or axillary lymphadenopathy.   LUNGS:  Clear to auscultation, no use of accessory muscles of respiration.   COR:  S1, S2 normal.  No S3, S4 or murmur.   ABDOMEN:  Soft, nontender, no mass or hepatosplenomegaly.   EXTREMITIES:  No calf tenderness or pedal edema.      For further details of the patient's history, please refer to the chart.         ROZINA WEAVER MD             D: 2019   T: 2019   MT: WT      Name:     MAGALY MCDANIELS   MRN:      [id number]        Account:       [id number]   :      10/11/1930           Consult Date:  2019      Document: [id number]

## 2019-07-29 NOTE — PLAN OF CARE
"PRIMARY DIAGNOSIS: UTI  OUTPATIENT/OBSERVATION GOALS TO BE MET BEFORE DISCHARGE:  ADLs back to baseline: Yes    Activity and level of assistance: Up with standby assistance.    Pain status: pt reporting mild back pain     Return to near baseline physical activity: Yes     Discharge Planner Nurse   Safe discharge environment identified: Yes  Barriers to discharge: No       Entered by: Jackie King 07/29/2019 8:09 AM     Please review provider order for any additional goals.   Nurse to notify provider when observation goals have been met and patient is ready for discharge.    /61 (BP Location: Left arm)   Pulse 83   Temp 100.4  F (38  C) (Oral)   Resp 24   Ht 1.549 m (5' 1\")   Wt 52.6 kg (116 lb)   SpO2 95%   BMI 21.92 kg/m      Orientation: A&Ox4  Neuro: WNL  Pain status: mild back pain, PRN tylenol given  Activity: SBA via gait belt and walker  Peripheral neurovascular: WNL  Resp: WNL  Lungs: clear  Cardiac: WNL ex tachy  GI: WNL  : pt continues to report dysuria, frequency, and vaginal burning  Skin: WNL  IVF: PIV SL  Meds: scheduled AM meds given. PRN tylenol given for mild back pain and elevated temp of 100.4  Labs/imaging: sepsis protocol flagged, lab drawing lactate level  Diet: regular, tolerating  Plan: pain control, PO macrobid for UTI      Pt has flat affect compared to yesterday, appears more tired and generalized weakness has increased since yesterday. Pt has elevated temp, sepsis protocol triggered. PRN tylenol given for elevated temp. Fluids encouraged. Pt lying in bed, does not appear diaphoretic, but looks pale in appearance. Will wait for lactate level to result and follow sepsis protocol accordingly.  "

## 2019-07-29 NOTE — PROGRESS NOTES
"Pt called reported she couldn't stop shaking. Pt was sitting upright in her chair, had emesis x 1, appears pale, continuously shaking, and states she feels chilled and tired. VSS except RR 32. LS clear in BUL, crackles auscultated BLL. Pt denies feeling SOB. Pt given IS for deep breathing. Pt then fell asleep in chair, RR started to decrease to regular breathing pattern, approximately 18-22breaths/min. MD notified.    /54 (BP Location: Right arm)   Pulse 83   Temp 97.2  F (36.2  C) (Axillary)   Resp (!) 32   Ht 1.549 m (5' 1\")   Wt 52.6 kg (116 lb)   SpO2 93%   BMI 21.92 kg/m    Blood sugar 89  "

## 2019-07-29 NOTE — PLAN OF CARE
PRIMARY DIAGNOSIS: UTI  OUTPATIENT/OBSERVATION GOALS TO BE MET BEFORE DISCHARGE:  ADLs back to baseline: No    Activity and level of assistance: Ax1 with walker and gait belt     Pain status: Improved-controlled with oral pain medications, chronic back pain PRN Tylenol given.    Return to near baseline physical activity: No     Discharge Planner Nurse   Safe discharge environment identified: Yes  Barriers to discharge: No       Entered by: Ange Wood 07/28/2019 11:29 PM     Please review provider order for any additional goals.   Nurse to notify provider when observation goals have been met and patient is ready for discharge.    VSS, on RA, afebrile. Up Ax1 with walker and gait belt. Up frequently to void with burning sensation. She states she is a little weak from getting up to use the bathroom frequently. A&O. Regular diet, good appetite. PRN Tylenol given for chronic back pain. Saline locked. Potassium recheck in am. Denies nausea, sob, dizziness. On oral macrobid for UTI. Plan- discharge tomorrow on antibiotic.

## 2019-07-29 NOTE — UTILIZATION REVIEW
"  Concomitant stay Status; Secondary Review Determination     Admission Date: 7/28/2019  7:26 AM      Under the authority of the Utilization Management Committee, the utilization review process indicated a secondary review on the above patient.  The review outcome is based on review of the medical records, discussions with staff, and applying clinical experience noted on the date of the review.        (x)      Inpatient Status Appropriate - This patient's medical care is consistent with medical management for inpatient care and reasonable inpatient medical practice.      () Observation Status Appropriate - This patient does not meet hospital inpatient criteria and is placed in observation status. If this patient's primary payer is Medicare and was admitted as an inpatient, Condition Code 44 should be used and patient status changed to \"observation\".   () Admission Status NOT Appropriate - This patient's medical care is not consistent with medical management for Inpatient or Observation Status.          RATIONALE FOR DETERMINATION   Dyaa Ignacio is a 88 year old woman with past medical history significant for chronic back pain, 2 recent hospitalizations for ESBL UTI, recent hospitalizations for ARF (due to meds) and n/v, recent GI bleed, and recent C. difficile treatment.She presented to the hospital with dysuria.  Found to have UTI.  It was initially hoped that she would be able to be treated with oral antibiotics with macrobid.  She was placed in the hospital on observation status.  Unfortunately, she has developed fevers and is feeling worse.  She has a known history of ESBL producing E. Coli.  She has now been started on IV meropenem to treat her UTI.  She will now require a greater than two midnight stay.  It is appropriate to admit her to the hospital as an inpatient.      The severity of illness, intensity of service provided, expected LOS and risk for adverse outcome make the care complex, high risk and " appropriate for hospital admission.        The information on this document is developed by the utilization review team in order for the business office to ensure compliance.  This only denotes the appropriateness of proper admission status and does not reflect the quality of care rendered.         The definitions of Inpatient Status and Observation Status used in making the determination above are those provided in the CMS Coverage Manual, Chapter 1 and Chapter 6, section 70.4.      Sincerely,     Bk Munoz D.O.  Utilization Review/ Case Management  [facility].

## 2019-07-29 NOTE — PROGRESS NOTES
"Pt up to chair this afternoon. VSS, ambulating SBA via gait belt and walker. Tolerating regular diet. IV meropenem given. Lovenox given. Continue to report dysuria. Plan to continue IV abx, PO vanco, pain control, and contact precautions for ESBL.     BP 96/41 (BP Location: Right arm)   Pulse 67   Temp 98.3  F (36.8  C) (Oral)   Resp 16   Ht 1.549 m (5' 1\")   Wt 52.6 kg (116 lb)   SpO2 95%   BMI 21.92 kg/m    "

## 2019-07-29 NOTE — PLAN OF CARE
PRIMARY DIAGNOSIS: PYELONEPHRITIS  OUTPATIENT/OBSERVATION GOALS TO BE MET BEFORE DISCHARGE:  1. Diagnostic test and consults (if applicable) complete: Yes    2. Vitals signs stable or return to baseline: No    3. Tolerate oral intake to maintain hydration: Yes    4. Pain status: Improved-controlled with oral pain medications.    5. Tolerating oral antibiotics or has plans for home infusion setup:  Yes    6. Return to near baseline physical activity: Yes    Discharge Planner Nurse   Safe discharge environment identified: Yes  Barriers to discharge: No       Entered by: Austin Valenzuela 07/29/2019 6:08 AM   Afebrile.  Vital signs stable.  Pain improved after Tylenol.  Voiding frequently.  Monitor and manage any pain or temp this morning.

## 2019-07-29 NOTE — PROGRESS NOTES
[facility]    Medicine Progress Note - Hospitalist Service       Date of Admission:  7/28/2019  Assessment & Plan      Daya Ignacio is a 88 year old woman with past medical history significant for chronic back pain, 2 recent hospitalizations for ESBL UTI, recent hospitalizations for ARF (due to meds) and n/v, recent GI bleed, and recent C. difficile treatment.  Patient was recently hospitalized 5/26 through 5/31 for ESBL UTI treated with Bactrim and again 6/10 through 6/16 for ESBL UTI (now resistant to Bactrim and treated with meropenem), and again 7/4-7/7 treated with meropenem. Patient admitted 7/28 with symptoms of dysuria, weakness and nausea after a urine culture from 7/22 grew ESBL and was not treated (had been admitted with n/v).     Urinary tract infection with ESBL E. coli and sepsis    - urine from 7/22 grew ESBL again    - received ertepenem in the ED and then admitted on Macrobid    - patient had fevers overnight with elevation of WBC    - discontinue macrobid and re-start Meropenem      - will consult ID as she has had multiple recent UTIs with ESBL E Coli andhas had multiple admissions    - she was supposed to follow up with ID as outpt    - has blood cultures pending from 7/28    History of recent C. difficile infection    - will go ahead and restart oral vanco for prophylaxis    - ID can change this plan if they do not feel she needs it       Coronary artery disease (history of an STEMI 5/2017)  Hypertension  History of congestive heart failure with preserved ejection fraction  History of moderate pulmonary hypertension    - Continue PTA Imdur, metoprolol, lisinopril, and clonidine.    - Patient does not appear volume overloaded.  She is not on any diuretics.    - Patient is notably no longer on aspirin.     Depression    - Continue PTA Zoloft     Neuropathy    - Continue PTA gabapentin    Chronic anemia    - at her baseline    Admit to inpatient for UTI with sepsis.  Called and  updated son.    Addendum: patient seen after vomiting x 1 after lunch. She had tolerated breakfast just fine. She ordered tuna for lunch, took a few bites and then vomited. She is now feeling better. Still has a little nausea. No pain. Will order IVF and monitor.    Diet: Regular Diet Adult    DVT Prophylaxis: Enoxaparin (Lovenox) SQ  Almanza Catheter: not present  Code Status: Full Code      Disposition Plan   Expected discharge: 2 - 3 days, recommended to prior living arrangement once antibiotic plan established.  Entered: Juliano Mckeon MD 07/29/2019, 9:42 AM       The patient's care was discussed with the Bedside Nurse, Patient and Patient's Family.    Juliano Mckeon MD  Hospitalist Service  [facility]    ______________________________________________________________________    Interval History   Patient in bed. States she slept very well, but was still fairly weak (generlized) when she got up to use the bathroom. No chest pain, sob, abdo pain, n/v/d. No further suprapubic pain (did have on admission)    Data reviewed today: I reviewed all medications, new labs and imaging results over the last 24 hours. I personally reviewed no images or EKG's today.    Physical Exam   Vital Signs: Temp: 100.3  F (37.9  C) Temp src: Oral BP: 112/53 Pulse: 98 Heart Rate: 89 Resp: 20 SpO2: 92 % O2 Device: None (Room air)    Weight: 116 lbs 0 oz  Constitutional: awake, alert, cooperative, no apparent distress, and appears stated age  Eyes: Lids and lashes normal, pupils equal, round and reactive to light, extra ocular muscles intact, sclera clear, conjunctiva normal  Respiratory: No increased work of breathing, good air exchange, clear to auscultation bilaterally, no crackles or wheezing  Cardiovascular: Normal apical impulse, regular rate and rhythm, normal S1 and S2, no S3 or S4, and no murmur noted  GI: No scars, normal bowel sounds, soft, non-distended, non-tender, no masses palpated, no  hepatosplenomegally  Skin: no bruising or bleeding  Musculoskeletal: no lower extremity pitting edema present    Data   Recent Labs   Lab 07/29/19  0548 07/28/19  0801 07/23/19  0632   WBC 11.3* 7.1 6.1   HGB 10.2* 11.2* 10.4*   MCV 97 94 94    230 207    139 138   POTASSIUM 3.7 3.2* 4.3   CHLORIDE 106 104 106   CO2 26 28 26   BUN 20 20 18   CR 0.75 0.57 0.84   ANIONGAP 7 7 6   DAVE 9.2 9.5 9.0   * 116* 87     No results found for this or any previous visit (from the past 24 hour(s)).

## 2019-07-30 ENCOUNTER — HOSPITAL ENCOUNTER (OUTPATIENT)
Facility: CLINIC | Age: 84
End: 2019-07-30
Attending: UROLOGY | Admitting: UROLOGY
Payer: MEDICARE

## 2019-07-30 ENCOUNTER — APPOINTMENT (OUTPATIENT)
Dept: ULTRASOUND IMAGING | Facility: CLINIC | Age: 84
DRG: 696 | End: 2019-07-30
Attending: HOSPITALIST
Payer: MEDICARE

## 2019-07-30 LAB
ANION GAP SERPL CALCULATED.3IONS-SCNC: 6 MMOL/L (ref 3–14)
ANISOCYTOSIS BLD QL SMEAR: SLIGHT
BASOPHILS # BLD AUTO: 0 10E9/L (ref 0–0.2)
BASOPHILS NFR BLD AUTO: 0 %
BUN SERPL-MCNC: 27 MG/DL (ref 7–30)
CALCIUM SERPL-MCNC: 8 MG/DL (ref 8.5–10.1)
CHLORIDE SERPL-SCNC: 104 MMOL/L (ref 94–109)
CO2 SERPL-SCNC: 24 MMOL/L (ref 20–32)
CREAT SERPL-MCNC: 1.02 MG/DL (ref 0.52–1.04)
DIFFERENTIAL METHOD BLD: ABNORMAL
EOSINOPHIL # BLD AUTO: 0 10E9/L (ref 0–0.7)
EOSINOPHIL NFR BLD AUTO: 0 %
ERYTHROCYTE [DISTWIDTH] IN BLOOD BY AUTOMATED COUNT: 15 % (ref 10–15)
ERYTHROCYTE [DISTWIDTH] IN BLOOD BY AUTOMATED COUNT: 15 % (ref 10–15)
GFR SERPL CREATININE-BSD FRML MDRD: 49 ML/MIN/{1.73_M2}
GLUCOSE SERPL-MCNC: 94 MG/DL (ref 70–99)
HCT VFR BLD AUTO: 24.3 % (ref 35–47)
HCT VFR BLD AUTO: 26.7 % (ref 35–47)
HGB BLD-MCNC: 7.8 G/DL (ref 11.7–15.7)
HGB BLD-MCNC: 8.6 G/DL (ref 11.7–15.7)
LYMPHOCYTES # BLD AUTO: 0.1 10E9/L (ref 0.8–5.3)
LYMPHOCYTES NFR BLD AUTO: 1 %
MCH RBC QN AUTO: 30.4 PG (ref 26.5–33)
MCH RBC QN AUTO: 30.8 PG (ref 26.5–33)
MCHC RBC AUTO-ENTMCNC: 32.1 G/DL (ref 31.5–36.5)
MCHC RBC AUTO-ENTMCNC: 32.2 G/DL (ref 31.5–36.5)
MCV RBC AUTO: 95 FL (ref 78–100)
MCV RBC AUTO: 96 FL (ref 78–100)
MONOCYTES # BLD AUTO: 0.2 10E9/L (ref 0–1.3)
MONOCYTES NFR BLD AUTO: 3 %
NEUTROPHILS # BLD AUTO: 6 10E9/L (ref 1.6–8.3)
NEUTROPHILS NFR BLD AUTO: 96 %
PLATELET # BLD AUTO: 106 10E9/L (ref 150–450)
PLATELET # BLD AUTO: 94 10E9/L (ref 150–450)
PLATELET # BLD EST: ABNORMAL 10*3/UL
POTASSIUM SERPL-SCNC: 3.6 MMOL/L (ref 3.4–5.3)
RBC # BLD AUTO: 2.57 10E12/L (ref 3.8–5.2)
RBC # BLD AUTO: 2.79 10E12/L (ref 3.8–5.2)
SODIUM SERPL-SCNC: 134 MMOL/L (ref 133–144)
WBC # BLD AUTO: 6.2 10E9/L (ref 4–11)
WBC # BLD AUTO: 6.3 10E9/L (ref 4–11)

## 2019-07-30 PROCEDURE — 80048 BASIC METABOLIC PNL TOTAL CA: CPT | Performed by: HOSPITALIST

## 2019-07-30 PROCEDURE — 85025 COMPLETE CBC W/AUTO DIFF WBC: CPT | Performed by: HOSPITALIST

## 2019-07-30 PROCEDURE — 99232 SBSQ HOSP IP/OBS MODERATE 35: CPT | Performed by: UROLOGY

## 2019-07-30 PROCEDURE — 99233 SBSQ HOSP IP/OBS HIGH 50: CPT | Performed by: HOSPITALIST

## 2019-07-30 PROCEDURE — 25000132 ZZH RX MED GY IP 250 OP 250 PS 637: Mod: GY | Performed by: INTERNAL MEDICINE

## 2019-07-30 PROCEDURE — 25000132 ZZH RX MED GY IP 250 OP 250 PS 637: Mod: GY | Performed by: HOSPITALIST

## 2019-07-30 PROCEDURE — 76830 TRANSVAGINAL US NON-OB: CPT

## 2019-07-30 PROCEDURE — 85049 AUTOMATED PLATELET COUNT: CPT | Performed by: HOSPITALIST

## 2019-07-30 PROCEDURE — 85027 COMPLETE CBC AUTOMATED: CPT | Performed by: HOSPITALIST

## 2019-07-30 PROCEDURE — 36415 COLL VENOUS BLD VENIPUNCTURE: CPT | Performed by: HOSPITALIST

## 2019-07-30 PROCEDURE — 12000011 ZZH R&B MS OVERFLOW

## 2019-07-30 PROCEDURE — 25000128 H RX IP 250 OP 636: Performed by: HOSPITALIST

## 2019-07-30 RX ORDER — TAMSULOSIN HYDROCHLORIDE 0.4 MG/1
0.4 CAPSULE ORAL DAILY
Status: DISCONTINUED | OUTPATIENT
Start: 2019-07-30 | End: 2019-08-02 | Stop reason: HOSPADM

## 2019-07-30 RX ADMIN — CLONIDINE HYDROCHLORIDE 0.1 MG: 0.1 TABLET ORAL at 09:03

## 2019-07-30 RX ADMIN — ISOSORBIDE DINITRATE 10 MG: 10 TABLET ORAL at 14:40

## 2019-07-30 RX ADMIN — GABAPENTIN 200 MG: 100 CAPSULE ORAL at 20:18

## 2019-07-30 RX ADMIN — VANCOMYCIN HYDROCHLORIDE 125 MG: KIT at 09:20

## 2019-07-30 RX ADMIN — GABAPENTIN 200 MG: 100 CAPSULE ORAL at 09:02

## 2019-07-30 RX ADMIN — TAMSULOSIN HYDROCHLORIDE 0.4 MG: 0.4 CAPSULE ORAL at 09:10

## 2019-07-30 RX ADMIN — SERTRALINE HYDROCHLORIDE 100 MG: 100 TABLET ORAL at 09:02

## 2019-07-30 RX ADMIN — POTASSIUM CHLORIDE AND SODIUM CHLORIDE: 900; 150 INJECTION, SOLUTION INTRAVENOUS at 03:24

## 2019-07-30 RX ADMIN — ISOSORBIDE DINITRATE 10 MG: 10 TABLET ORAL at 20:17

## 2019-07-30 RX ADMIN — CLONIDINE HYDROCHLORIDE 0.1 MG: 0.1 TABLET ORAL at 20:18

## 2019-07-30 RX ADMIN — MEROPENEM 1 G: 1 INJECTION, POWDER, FOR SOLUTION INTRAVENOUS at 09:10

## 2019-07-30 RX ADMIN — AMLODIPINE BESYLATE 10 MG: 10 TABLET ORAL at 09:02

## 2019-07-30 RX ADMIN — METOPROLOL SUCCINATE 100 MG: 100 TABLET, EXTENDED RELEASE ORAL at 09:02

## 2019-07-30 RX ADMIN — ISOSORBIDE DINITRATE 10 MG: 10 TABLET ORAL at 09:03

## 2019-07-30 NOTE — PROVIDER NOTIFICATION
"MD paged. \"Post void bladder scan for 415 ml. No orders to straight cath. can you please place if wanted. Thanks\"  "

## 2019-07-30 NOTE — PLAN OF CARE
"BP (!) 143/64 (BP Location: Right arm)   Pulse 76   Temp 96.3  F (35.7  C) (Oral)   Resp 16   Ht 1.549 m (5' 1\")   Wt 52.6 kg (116 lb)   SpO2 94%   BMI 21.92 kg/m      Orientation: A&Ox4  Neuro: WNL  Pain status: pt denies pain, but reports vaginal burning and dysuria  Activity: SBA via gait belt and walker  Peripheral neurovascular: WNL  Resp: pt noted to have audible expiratory wheezes when lying in bed, however resolved with repositioning and pt returned to regular breathing pattern  Lungs: auscultated clear in all lobes  Cardiac: WNL  GI: pt having several incontinent loose stools - bowel sounds present, pt denies nausea, no emesis.  : dysuria, frequency, vaginal burning  Skin: blanching redness on coccyx  IVF: NS + KCl 100mL/hr stopped - now PIV SL  Meds: flomax started  Labs/imaging: hgb dropped today (was 10.2 yesterday, this AM was 7.8, recheck increased to 8.6); US pelvic done  Diet: regular - tolerating  Consults: ID - see note. Urology. CC.  Plan: contact precautions maintained. IV meopenem and PO vanco discontinued per ID. PVR.   "

## 2019-07-30 NOTE — PROGRESS NOTES
[facility]  Infectious Disease Progress Note          Assessment and Plan:   IMPRESSION:   1.  An 88-year-old female with a complicated recent past medical history including multiple treatments for ESBL E. coli urinary tract infection.  She was seen by Dr. Camejo in 07/2019.  At that time she had been diagnosed with ESBL UTI, sensitive to Septra, with which she was treated.  She then returned with a sulfa resistant strain of the ESBL E. coli.  At that time she received a course of IV meropenem.     2.  Earlier this month, patient again was treated with meropenem for fewer than 10 to the 5th colonies of ESBL E. coli.     3.  The patient was readmitted 07/22 for nausea and vomiting.  At that time, urinalysis showed 31 white blood cells and urine culture grew greater than 100,000 colonies of ESBL E. coli.  This was not treated.     4.  The patient returns to the hospital on this occasion with complaint of vaginal pain, dysuria, weakness and nausea.  Her maximal temperature has been 100.4 degrees and white blood count was elevated to 11,300.  I am not convinced that the patient's symptoms at present are secondary to urinary tract infection as her current urinalysis shows 3 white blood cells, 1 red blood cell, and urine and blood cultures are negative.  It is possible that the patient may be colonized with ESBL E. coli and that her symptoms are explained by something else.  The patient did have renal ultrasound done about 2 weeks ago which did not show any perinephric abscess.   5. Urinary retension    RECOMMENDATIONS:   1.  with neg urine and blood cxs will discontinue meropenem and oral vanco.  2. With urinary retension might be good to reinvolve Urology.  3. Consider also GYN opinion re pts vaginal pain.        Interval History:   Now afebrile.  Urine and blood cxs negative.  Still notes some vaginal pain but overall feels better.  WBC normal.  Had large residual urine this am.               "Medications:       amLODIPine  10 mg Oral Daily     cloNIDine  0.1 mg Oral BID     enoxaparin  40 mg Subcutaneous Q24H     gabapentin  200 mg Oral BID     isosorbide dinitrate  10 mg Oral TID     meropenem  1 g Intravenous Q12H     metoprolol succinate ER  100 mg Oral Daily     sertraline  100 mg Oral Daily     tamsulosin  0.4 mg Oral Daily     vancomycin  125 mg Oral 4x Daily                  Physical Exam:   Blood pressure (!) 143/64, pulse 76, temperature 96.3  F (35.7  C), temperature source Oral, resp. rate 16, height 1.549 m (5' 1\"), weight 52.6 kg (116 lb), SpO2 94 %, not currently breastfeeding.  [unfilled]  Vital Signs with Ranges  Temp:  [96.3  F (35.7  C)-99.4  F (37.4  C)] 96.3  F (35.7  C)  Pulse:  [67-83] 76  Heart Rate:  [67-89] 76  Resp:  [15-32] 16  BP: ()/(36-65) 143/64  SpO2:  [90 %-95 %] 94 %    Constitutional: Awake, alert, cooperative, no apparent distress   Lungs: Clear to auscultation bilaterally, no crackles or wheezing   Cardiovascular: Regular rate and rhythm, normal S1 and S2, and no murmur noted   Abdomen: Normal bowel sounds, soft, non-distended, non-tender   Skin: No rashes, no cyanosis, no edema   Other:           Data:   All microbiology laboratory data reviewed.  Recent Labs   Lab Test 07/30/19  0547 07/29/19  0548 07/28/19  0801   WBC 6.2 11.3* 7.1   HGB 7.8* 10.2* 11.2*   HCT 24.3* 32.3* 34.3*   MCV 95 97 94   PLT 94* 177 230     Recent Labs   Lab Test 07/30/19  0547 07/29/19  0548 07/28/19  0801   CR 1.02 0.75 0.57     No lab results found.  "

## 2019-07-30 NOTE — PROGRESS NOTES
PVR >850mL, MD notified, straight cath orders obtained, 600mL out after, bladders scan after straight cath resulted 0mL. Pt denied bladder pain or discomfort before or after straight cath. Urine was clear and straw/yellow.

## 2019-07-30 NOTE — PROGRESS NOTES
[facility]    Medicine Progress Note - Hospitalist Service       Date of Admission:  7/28/2019  Assessment & Plan      Daya Ignacio is a 88 year old woman with past medical history significant for chronic back pain, 2 recent hospitalizations for ESBL UTI, recent hospitalizations for ARF (due to meds) and n/v, recent GI bleed, and recent C. difficile treatment.  Patient was recently hospitalized 5/26 through 5/31 for ESBL UTI treated with Bactrim and again 6/10 through 6/16 for ESBL UTI (now resistant to Bactrim and treated with meropenem), and again 7/4-7/7 treated with meropenem. Patient admitted 7/28 with symptoms of dysuria, weakness and nausea after a urine culture from 7/22 grew ESBL and was not treated (had been admitted with n/v). Urine cultures are negative.     Urinary tract infection with ESBL E. coli and sepsis    - urine from 7/22 grew ESBL again    - received ertepenem in the ED and then admitted on Macrobid    - yesterday discontinued macrobid and re-started Meropenem      - blood cultures so far negative    - seen by Dr. Klein (ID): urine cultures today are negative, so Meropenem stopped    - unclear why she had fevers/elevated WBC, work-up otherwise negative    Recurrent UTI vs colonization of ESBL E Coli in the setting of retention    - spoke with Urology: first step in this case would be cystoscopy, so I will have them see her here    - started flomax    - intermittent cath    Vaginal pain    - patient did not complain of this to me yesterday or today    - family states she has been complaining of vaginal    - I do not see/feel anything on exam    - will start with TV US and then will consider involving OBGYN    History of recent C. difficile infection    - will go ahead and restart oral vanco for prophylaxis    - ID can change this plan if they do not feel she needs it       Coronary artery disease (history of an STEMI 5/2017)  Hypertension  History of congestive heart failure with  preserved ejection fraction  History of moderate pulmonary hypertension    - Continue PTA Imdur, metoprolol, lisinopril, and clonidine.    - Patient does not appear volume overloaded.  She is not on any diuretics.    - Patient is notably no longer on aspirin.     Depression    - Continue PTA Zoloft     Neuropathy    - Continue PTA gabapentin    Acute on Chronic anemia    - no signs of active bleeding    - may be partially dilutional    - monitor    New thrombocytopenia    - stop lovenox    - repeat in am    - I wonder if there is an issue with her CBC as there are large differences today.     - I will repeat    Met with son and updated him    Diet: Regular Diet Adult    DVT Prophylaxis: Enoxaparin (Lovenox) SQ  Almanza Catheter: not present  Code Status: Full Code      Disposition Plan   Expected discharge: 2 - 3 days, recommended to prior living arrangement once antibiotic plan established.  Entered: Juliano Mckeon MD 07/30/2019, 11:06 AM       The patient's care was discussed with the Bedside Nurse, Patient and Patient's Family.    Juliano Mckeon MD  Hospitalist Service  [facility]    ______________________________________________________________________    Interval History   Patient in chair. States that she feels much better today. No chest pain, sob, abdo pain, n/v/d. I personally helped clean her up and witnessed brown stool  Data reviewed today: I reviewed all medications, new labs and imaging results over the last 24 hours. I personally reviewed no images or EKG's today.    Physical Exam   Vital Signs: Temp: 96.3  F (35.7  C) Temp src: Oral BP: (!) 143/64 Pulse: 76 Heart Rate: 76 Resp: 16 SpO2: 94 % O2 Device: None (Room air)    Weight: 116 lbs 0 oz  Constitutional: awake, alert, cooperative, no apparent distress, and appears stated age  Eyes: Lids and lashes normal, pupils equal, round and reactive to light, extra ocular muscles intact, sclera clear, conjunctiva normal  Respiratory: No  increased work of breathing, good air exchange, clear to auscultation bilaterally, no crackles or wheezing  Cardiovascular: Normal apical impulse, regular rate and rhythm, normal S1 and S2, no S3 or S4, and no murmur noted  GI: No scars, normal bowel sounds, soft, non-distended, non-tender, no masses palpated, no hepatosplenomegally  Skin: no bruising or bleeding  Musculoskeletal: no lower extremity pitting edema present  Vaginal exam: no erythema or lesions seen of outer vagina/vault. Manual exam did not reveal any masses. No discharge    Data   Recent Labs   Lab 07/30/19  0547 07/29/19  0548 07/28/19  0801   WBC 6.2 11.3* 7.1   HGB 7.8* 10.2* 11.2*   MCV 95 97 94   PLT 94* 177 230    139 139   POTASSIUM 3.6 3.7 3.2*   CHLORIDE 104 106 104   CO2 24 26 28   BUN 27 20 20   CR 1.02 0.75 0.57   ANIONGAP 6 7 7   DAVE 8.0* 9.2 9.5   GLC 94 121* 116*     No results found for this or any previous visit (from the past 24 hour(s)).

## 2019-07-30 NOTE — CONSULTS
Daya Sullivan is an 88-year-old female with recurrent E. coli urinary tract infections.  She has had 3 infections in the last 6 weeks and is currently hospitalized with a negative urine culture.  She also has a history of C. difficile colitis, coronary artery disease, chronic low back pain, low-grade fevers, recent admission for nausea and vomiting, history of depression, neuropathy, pulmonary hypertension, congestive heart failure, hypertension  Allergies to Macrobid, sulfa, a atorvastatin  She had a normal cystoscopy in 2015 with Tye Caballero MD.  She denies any pneumaturia, fecaluria  Recent renal ultrasound is normal  Vaginal ultrasound this morning shows a left ovarian cyst  Laboratory studies: Normal electrolytes, creatinine 1.02, normal serum calcium, white blood cell count.  Hemoglobin 7.8 with 94,000 platelets.  Urinalysis on admission was unremarkable, pH 7.0, specific gravity 1.015  Medications reviewed  Exam: Alert and oriented, normal respirations, normocephalic  Assessment: Chronic urinary retention, recurrent bacterial cystitis, vaginal pain  Plan: EUA, cystoscopy on Thursday.  I gave her the option of seeing our female urology specialist, Dr. Jansen.  It is very difficult for her to get to office appointments.

## 2019-07-30 NOTE — PLAN OF CARE
Orientation: Alert and oriented x4. Lethargic.   VSS. 91% on RA. Temp max of 99.4.   LS: clear and equal bilaterally  GI: Passing gas. no BM. Denies N/V.   : Adequate urine output. Purewick in place.   Skin: pale otherwise intact  Activity: 2 assist. Pt slept comfortably throughout shift.   Pain: 0/10. Denies pain throughout shift. No PRN interventions overnight.   Plan: Continue with current cares.     6:13 AM Bladder scanned for >999 ml. Up to bathroom. Missed hat during void. Post void bladder scan for 415 ml. Deondre CLEMENTE.

## 2019-07-31 LAB
ANION GAP SERPL CALCULATED.3IONS-SCNC: 6 MMOL/L (ref 3–14)
BASOPHILS # BLD AUTO: 0 10E9/L (ref 0–0.2)
BASOPHILS NFR BLD AUTO: 0 %
BUN SERPL-MCNC: 28 MG/DL (ref 7–30)
C DIFF TOX B STL QL: NEGATIVE
CALCIUM SERPL-MCNC: 8.1 MG/DL (ref 8.5–10.1)
CHLORIDE SERPL-SCNC: 105 MMOL/L (ref 94–109)
CO2 SERPL-SCNC: 23 MMOL/L (ref 20–32)
CREAT SERPL-MCNC: 0.72 MG/DL (ref 0.52–1.04)
DIFFERENTIAL METHOD BLD: ABNORMAL
EOSINOPHIL # BLD AUTO: 0.2 10E9/L (ref 0–0.7)
EOSINOPHIL NFR BLD AUTO: 5 %
ERYTHROCYTE [DISTWIDTH] IN BLOOD BY AUTOMATED COUNT: 14.8 % (ref 10–15)
GFR SERPL CREATININE-BSD FRML MDRD: 74 ML/MIN/{1.73_M2}
GLUCOSE SERPL-MCNC: 92 MG/DL (ref 70–99)
HCT VFR BLD AUTO: 22.9 % (ref 35–47)
HGB BLD-MCNC: 7.5 G/DL (ref 11.7–15.7)
LYMPHOCYTES # BLD AUTO: 0.3 10E9/L (ref 0.8–5.3)
LYMPHOCYTES NFR BLD AUTO: 6 %
MCH RBC QN AUTO: 30.5 PG (ref 26.5–33)
MCHC RBC AUTO-ENTMCNC: 32.8 G/DL (ref 31.5–36.5)
MCV RBC AUTO: 93 FL (ref 78–100)
MONOCYTES # BLD AUTO: 0.2 10E9/L (ref 0–1.3)
MONOCYTES NFR BLD AUTO: 5 %
NEUTROPHILS # BLD AUTO: 3.9 10E9/L (ref 1.6–8.3)
NEUTROPHILS NFR BLD AUTO: 84 %
PLATELET # BLD AUTO: 93 10E9/L (ref 150–450)
PLATELET # BLD EST: ABNORMAL 10*3/UL
POTASSIUM SERPL-SCNC: 3.3 MMOL/L (ref 3.4–5.3)
POTASSIUM SERPL-SCNC: 4 MMOL/L (ref 3.4–5.3)
RBC # BLD AUTO: 2.46 10E12/L (ref 3.8–5.2)
RBC MORPH BLD: ABNORMAL
SODIUM SERPL-SCNC: 134 MMOL/L (ref 133–144)
SPECIMEN SOURCE: NORMAL
WBC # BLD AUTO: 4.7 10E9/L (ref 4–11)

## 2019-07-31 PROCEDURE — 84132 ASSAY OF SERUM POTASSIUM: CPT | Performed by: HOSPITALIST

## 2019-07-31 PROCEDURE — 12000011 ZZH R&B MS OVERFLOW

## 2019-07-31 PROCEDURE — 25000128 H RX IP 250 OP 636: Performed by: HOSPITALIST

## 2019-07-31 PROCEDURE — 80048 BASIC METABOLIC PNL TOTAL CA: CPT | Performed by: HOSPITALIST

## 2019-07-31 PROCEDURE — 85025 COMPLETE CBC W/AUTO DIFF WBC: CPT | Performed by: HOSPITALIST

## 2019-07-31 PROCEDURE — 25000132 ZZH RX MED GY IP 250 OP 250 PS 637: Mod: GY | Performed by: INTERNAL MEDICINE

## 2019-07-31 PROCEDURE — 36415 COLL VENOUS BLD VENIPUNCTURE: CPT | Performed by: HOSPITALIST

## 2019-07-31 PROCEDURE — 99233 SBSQ HOSP IP/OBS HIGH 50: CPT | Performed by: HOSPITALIST

## 2019-07-31 PROCEDURE — 25000131 ZZH RX MED GY IP 250 OP 636 PS 637: Mod: GY | Performed by: INTERNAL MEDICINE

## 2019-07-31 PROCEDURE — 25000132 ZZH RX MED GY IP 250 OP 250 PS 637: Mod: GY | Performed by: HOSPITALIST

## 2019-07-31 PROCEDURE — 87493 C DIFF AMPLIFIED PROBE: CPT | Performed by: PHYSICIAN ASSISTANT

## 2019-07-31 RX ORDER — LOPERAMIDE HCL 2 MG
2 CAPSULE ORAL ONCE
Status: COMPLETED | OUTPATIENT
Start: 2019-07-31 | End: 2019-07-31

## 2019-07-31 RX ORDER — LOPERAMIDE HCL 2 MG
2 CAPSULE ORAL 2 TIMES DAILY PRN
Status: DISCONTINUED | OUTPATIENT
Start: 2019-07-31 | End: 2019-08-01

## 2019-07-31 RX ORDER — POTASSIUM CHLORIDE 1500 MG/1
20 TABLET, EXTENDED RELEASE ORAL ONCE
Status: DISCONTINUED | OUTPATIENT
Start: 2019-07-31 | End: 2019-08-01

## 2019-07-31 RX ORDER — SODIUM CHLORIDE AND POTASSIUM CHLORIDE 150; 900 MG/100ML; MG/100ML
INJECTION, SOLUTION INTRAVENOUS CONTINUOUS
Status: DISCONTINUED | OUTPATIENT
Start: 2019-07-31 | End: 2019-08-02

## 2019-07-31 RX ADMIN — POTASSIUM CHLORIDE 20 MEQ: 1.5 POWDER, FOR SOLUTION ORAL at 09:29

## 2019-07-31 RX ADMIN — POTASSIUM CHLORIDE 40 MEQ: 1.5 POWDER, FOR SOLUTION ORAL at 08:28

## 2019-07-31 RX ADMIN — GABAPENTIN 200 MG: 100 CAPSULE ORAL at 08:28

## 2019-07-31 RX ADMIN — CLONIDINE HYDROCHLORIDE 0.1 MG: 0.1 TABLET ORAL at 19:52

## 2019-07-31 RX ADMIN — SERTRALINE HYDROCHLORIDE 100 MG: 100 TABLET ORAL at 08:28

## 2019-07-31 RX ADMIN — ACETAMINOPHEN 650 MG: 325 TABLET, FILM COATED ORAL at 19:52

## 2019-07-31 RX ADMIN — TAMSULOSIN HYDROCHLORIDE 0.4 MG: 0.4 CAPSULE ORAL at 08:28

## 2019-07-31 RX ADMIN — LOPERAMIDE HYDROCHLORIDE 2 MG: 2 CAPSULE ORAL at 09:28

## 2019-07-31 RX ADMIN — ISOSORBIDE DINITRATE 10 MG: 10 TABLET ORAL at 19:52

## 2019-07-31 RX ADMIN — METOPROLOL SUCCINATE 100 MG: 100 TABLET, EXTENDED RELEASE ORAL at 08:28

## 2019-07-31 RX ADMIN — ISOSORBIDE DINITRATE 10 MG: 10 TABLET ORAL at 08:28

## 2019-07-31 RX ADMIN — POTASSIUM CHLORIDE AND SODIUM CHLORIDE: 900; 150 INJECTION, SOLUTION INTRAVENOUS at 20:00

## 2019-07-31 RX ADMIN — CLONIDINE HYDROCHLORIDE 0.1 MG: 0.1 TABLET ORAL at 08:28

## 2019-07-31 RX ADMIN — GABAPENTIN 200 MG: 100 CAPSULE ORAL at 19:52

## 2019-07-31 RX ADMIN — AMLODIPINE BESYLATE 10 MG: 10 TABLET ORAL at 08:28

## 2019-07-31 RX ADMIN — LOPERAMIDE HYDROCHLORIDE 2 MG: 2 CAPSULE ORAL at 15:21

## 2019-07-31 RX ADMIN — ONDANSETRON 4 MG: 4 TABLET, ORALLY DISINTEGRATING ORAL at 08:32

## 2019-07-31 RX ADMIN — ISOSORBIDE DINITRATE 10 MG: 10 TABLET ORAL at 13:13

## 2019-07-31 RX ADMIN — POTASSIUM CHLORIDE AND SODIUM CHLORIDE: 900; 150 INJECTION, SOLUTION INTRAVENOUS at 09:28

## 2019-07-31 NOTE — PLAN OF CARE
[id number]: Pt resting comfortably, slept on and off during the night. VSS. A&O. Voiding frequently. Transfers with Ax1. Cdiff negative. Will continue to monitor.

## 2019-07-31 NOTE — PROGRESS NOTES
Pt ambulated in the hallway SBA with gait belt and walker.Pt c/o of low back pain and SOB.SpO2 98% at room air.

## 2019-07-31 NOTE — PROGRESS NOTES
UROLOGY    Cysto tomorrow with Dr. Carlson. NPO after midnight.    Bonita Whitaker PA-C  [facility]  [phone]

## 2019-07-31 NOTE — PROGRESS NOTES
[facility]    Medicine Progress Note - Hospitalist Service       Date of Admission:  7/28/2019  Assessment & Plan      Daya Ignacio is a 88 year old woman with past medical history significant for chronic back pain, 2 recent hospitalizations for ESBL UTI, recent hospitalizations for ARF (due to meds) and n/v, recent GI bleed, and recent C. difficile treatment.  Patient was recently hospitalized 5/26 through 5/31 for ESBL UTI treated with Bactrim and again 6/10 through 6/16 for ESBL UTI (now resistant to Bactrim and treated with meropenem), and again 7/4-7/7 treated with meropenem. Patient admitted 7/28 with symptoms of dysuria, weakness and nausea after a urine culture from 7/22 grew ESBL and was not treated (had been admitted with n/v). Urine cultures are negative.     Urinary tract infection with ESBL E. coli and sepsis    - urine from 7/22 grew ESBL again    - received ertepenem in the ED and then admitted on Macrobid    -Monday discontinued macrobid and re-started Meropenem      - blood cultures negative    - now urine cultures negative, so meropenem stopped yesterday    - followed by Dr. Klein    - unclear why she had fevers/elevated WBC, work-up otherwise negative    Recurrent UTI vs colonization of ESBL E Coli in the setting of retention    - plan is for cystoscopy tomorrow    - started flomax    - intermittent cath    Vaginal pain    - patient did not complain of this to me yesterday or today    - family states she has been complaining of vaginal    - I do not see/feel anything on exam    - TV US done (again)- nothing revealing    - I would have her follow-up with GYN as outpt    New diarrhea with history of recent C. difficile infection     - had 4 loose stools last night and 5 so far today    - was treated with oral vanco for the short time she was on meropenem    - vanco now stopped    - stool from last night was neg for c diff    - may be diarrhea from the antibiotics she received    -  will try some loperamide      Coronary artery disease (history of an STEMI 5/2017)  Hypertension  History of congestive heart failure with preserved ejection fraction  History of moderate pulmonary hypertension    - Continue PTA Imdur, metoprolol, lisinopril, and clonidine.    - Patient does not appear volume overloaded.  She is not on any diuretics.    - Patient is notably no longer on aspirin.     Depression    - Continue PTA Zoloft     Neuropathy    - Continue PTA gabapentin    Acute on Chronic anemia    - no signs of active bleeding    - may be partially dilutional and/or due to meropenem    - monitor    New thrombocytopenia    - stopped lovenox    - repeat in am    - may be due to side effect of antibiotics    Dispo planning    - patient did ok walking in halls     - son working on possibly transitioning her to assisted living    - may need TCU stay, will have PT assess    Called son and updated him in detail    Diet: Regular Diet Adult  NPO per Anesthesia Guidelines for Procedure/Surgery Except for: Meds    DVT Prophylaxis: Enoxaparin (Lovenox) SQ  Almanza Catheter: not present  Code Status: Full Code      Disposition Plan   Expected discharge: 1-2 days, recommended to unclear once antibiotic plan established.  Entered: Juliano Mckeon MD 07/31/2019, 12:48 PM       The patient's care was discussed with the Bedside Nurse, Patient and Patient's Family.    Juliano Mckeon MD  Hospitalist Service  [facility]    ______________________________________________________________________    Interval History   Patient in chair. Friend in room. Doing better today. Walked halls. Eating. No chest pain, sob, abdo pain, n/v. Having diarrhea that started yesterday afternoon. Had 4 overnight. 5 so far today.    Data reviewed today: I reviewed all medications, new labs and imaging results over the last 24 hours. I personally reviewed no images or EKG's today.    Physical Exam   Vital Signs: Temp: 95.7  F (35.4  C)  Temp src: Oral BP: 121/51 Pulse: 71 Heart Rate: 63 Resp: 18 SpO2: 100 % O2 Device: None (Room air)    Weight: 116 lbs 0 oz  Constitutional: awake, alert, cooperative, no apparent distress, and appears stated age  Eyes: Lids and lashes normal, pupils equal, round and reactive to light, extra ocular muscles intact, sclera clear, conjunctiva normal  Respiratory: No increased work of breathing, good air exchange, clear to auscultation bilaterally, no crackles or wheezing  Cardiovascular: Normal apical impulse, regular rate and rhythm, normal S1 and S2, no S3 or S4, and no murmur noted  GI: No scars, normal bowel sounds, soft, non-distended, non-tender, no masses palpated, no hepatosplenomegally  Skin: no bruising or bleeding  Musculoskeletal: no lower extremity pitting edema present  Vaginal exam: no erythema or lesions seen of outer vagina/vault. Manual exam did not reveal any masses. No discharge    Data   Recent Labs   Lab 07/31/19  0536 07/30/19  1311 07/30/19  0547 07/29/19  0548   WBC 4.7 6.3 6.2 11.3*   HGB 7.5* 8.6* 7.8* 10.2*   MCV 93 96 95 97   PLT 93* 106* 94* 177     --  134 139   POTASSIUM 3.3*  --  3.6 3.7   CHLORIDE 105  --  104 106   CO2 23  --  24 26   BUN 28  --  27 20   CR 0.72  --  1.02 0.75   ANIONGAP 6  --  6 7   DAVE 8.1*  --  8.0* 9.2   GLC 92  --  94 121*     No results found for this or any previous visit (from the past 24 hour(s)).

## 2019-07-31 NOTE — PROGRESS NOTES
Pt reported she was having difficulty urinating and felt she was retaining urine. Bladder scan >600mL, obtained orders to straight cath and got 300mL out. Pt stated she felt better. Palpated hard mass above pelvis. Charge RN notified, PA to be notified.

## 2019-07-31 NOTE — PROGRESS NOTES
[facility]  Infectious Disease Progress Note          Assessment and Plan:   IMPRESSION:   1.  An 88-year-old female with a complicated recent past medical history including multiple treatments for ESBL E. coli urinary tract infection.  She was seen by Dr. Camejo in 07/2019.  At that time she had been diagnosed with ESBL UTI, sensitive to Septra, with which she was treated.  She then returned with a sulfa resistant strain of the ESBL E. coli.  At that time she received a course of IV meropenem.     2.  Earlier this month, patient again was treated with meropenem for fewer than 10 to the 5th colonies of ESBL E. coli.     3.  The patient was readmitted 07/22 for nausea and vomiting.  At that time, urinalysis showed 31 white blood cells and urine culture grew greater than 100,000 colonies of ESBL E. coli.  This was not treated.     4.  The patient returns to the hospital on this occasion with complaint of vaginal pain, dysuria, weakness and nausea.  Her maximal temperature has been 100.4 degrees and white blood count was elevated to 11,300.  I am not convinced that the patient's symptoms at present are secondary to urinary tract infection as her current urinalysis shows 3 white blood cells, 1 red blood cell, and urine and blood cultures are negative.  It is possible that the patient may be colonized with ESBL E. coli and that her symptoms are explained by something else.  The patient did have renal ultrasound done about 2 weeks ago which did not show any perinephric abscess.   5. Urinary retension    RECOMMENDATIONS:   1.  cont to observe off abs  2.  Appreciate Dr. Carlson's consultation.  Scheduled for cystoscopy tomorrow.        Interval History:   Now off abs.  Remains afebrile.  WBC remains normal.  No new pos cxs.  Had some loose stool last night.  C diff neg.  Vaginal discomfort about the same.              Medications:       amLODIPine  10 mg Oral Daily     cloNIDine  0.1 mg Oral BID      "gabapentin  200 mg Oral BID     isosorbide dinitrate  10 mg Oral TID     metoprolol succinate ER  100 mg Oral Daily     potassium chloride  20 mEq Oral Once     sertraline  100 mg Oral Daily     tamsulosin  0.4 mg Oral Daily                  Physical Exam:   Blood pressure 136/69, pulse 71, temperature 96.9  F (36.1  C), temperature source Oral, resp. rate 18, height 1.549 m (5' 1\"), weight 52.6 kg (116 lb), SpO2 96 %, not currently breastfeeding.  [unfilled]  Vital Signs with Ranges  Temp:  [95.5  F (35.3  C)-98.1  F (36.7  C)] 96.9  F (36.1  C)  Pulse:  [69-80] 71  Heart Rate:  [69-70] 69  Resp:  [16-18] 18  BP: (114-148)/(49-69) 136/69  SpO2:  [94 %-96 %] 96 %    Constitutional: Awake, alert, cooperative, no apparent distress   Lungs: Clear to auscultation bilaterally, no crackles or wheezing   Cardiovascular: Regular rate and rhythm, normal S1 and S2, and no murmur noted   Abdomen: Normal bowel sounds, soft, non-distended, non-tender   Skin: No rashes, no cyanosis, no edema   Other:           Data:   All microbiology laboratory data reviewed.  Recent Labs   Lab Test 07/31/19  0536 07/30/19  1311 07/30/19  0547   WBC 4.7 6.3 6.2   HGB 7.5* 8.6* 7.8*   HCT 22.9* 26.7* 24.3*   MCV 93 96 95   PLT 93* 106* 94*     Recent Labs   Lab Test 07/31/19  0536 07/30/19  0547 07/29/19  0548   CR 0.72 1.02 0.75     No lab results found.  "

## 2019-07-31 NOTE — PLAN OF CARE
"/46 (BP Location: Left arm)   Pulse 64   Temp 96.7  F (35.9  C) (Oral)   Resp 18   Ht 1.549 m (5' 1\")   Wt 52.6 kg (116 lb)   SpO2 98%   BMI 21.92 kg/m      Orientation: A&Ox4  Neuro: WNL  Pain status: denies dysuria, however reporting mild vaginal burning  Activity: SBA gait belt walker  Peripheral neurovascular: WNL  Resp: some SOB during ambulation around unit, O2sats 98%, baseline when back to sitting  Lungs: clear  Cardiac: WNL  GI: loose stool in AM - neg cdiff, PRN imodium ordered and stool starting to form  : frequency in AM, however toward evening pt started to report bladder discomfort, PVR >600mL, straight cath for 300mL. Palpated hard mass anterior pubis, pt reported relief after straight cath  Skin: blanching redness on coccyx  IVF: NS + KCl  Diet: regular - tolerating  Consults: urology - cysto planned for tomorrow with BRITTNI Carlson - see note  Plan: continue current plan of care, monitor urine output  "

## 2019-08-01 ENCOUNTER — ANESTHESIA EVENT (OUTPATIENT)
Dept: SURGERY | Facility: CLINIC | Age: 84
DRG: 696 | End: 2019-08-01
Payer: MEDICARE

## 2019-08-01 ENCOUNTER — ANESTHESIA (OUTPATIENT)
Dept: SURGERY | Facility: CLINIC | Age: 84
DRG: 696 | End: 2019-08-01
Payer: MEDICARE

## 2019-08-01 ENCOUNTER — APPOINTMENT (OUTPATIENT)
Dept: PHYSICAL THERAPY | Facility: CLINIC | Age: 84
DRG: 696 | End: 2019-08-01
Attending: HOSPITALIST
Payer: MEDICARE

## 2019-08-01 LAB
ABO + RH BLD: NORMAL
ABO + RH BLD: NORMAL
ANION GAP SERPL CALCULATED.3IONS-SCNC: 4 MMOL/L (ref 3–14)
BASOPHILS # BLD AUTO: 0 10E9/L (ref 0–0.2)
BASOPHILS NFR BLD AUTO: 0.3 %
BLD GP AB SCN SERPL QL: NORMAL
BLOOD BANK CMNT PATIENT-IMP: NORMAL
BUN SERPL-MCNC: 17 MG/DL (ref 7–30)
CALCIUM SERPL-MCNC: 8 MG/DL (ref 8.5–10.1)
CHLORIDE SERPL-SCNC: 111 MMOL/L (ref 94–109)
CO2 SERPL-SCNC: 23 MMOL/L (ref 20–32)
CREAT SERPL-MCNC: 0.6 MG/DL (ref 0.52–1.04)
DIFFERENTIAL METHOD BLD: ABNORMAL
EOSINOPHIL # BLD AUTO: 0.3 10E9/L (ref 0–0.7)
EOSINOPHIL NFR BLD AUTO: 9.6 %
ERYTHROCYTE [DISTWIDTH] IN BLOOD BY AUTOMATED COUNT: 14.7 % (ref 10–15)
GFR SERPL CREATININE-BSD FRML MDRD: 81 ML/MIN/{1.73_M2}
GLUCOSE SERPL-MCNC: 87 MG/DL (ref 70–99)
HCT VFR BLD AUTO: 23.6 % (ref 35–47)
HGB BLD-MCNC: 7.5 G/DL (ref 11.7–15.7)
IMM GRANULOCYTES # BLD: 0 10E9/L (ref 0–0.4)
IMM GRANULOCYTES NFR BLD: 0.9 %
LYMPHOCYTES # BLD AUTO: 0.4 10E9/L (ref 0.8–5.3)
LYMPHOCYTES NFR BLD AUTO: 11.4 %
MAGNESIUM SERPL-MCNC: 1.5 MG/DL (ref 1.6–2.3)
MCH RBC QN AUTO: 30.1 PG (ref 26.5–33)
MCHC RBC AUTO-ENTMCNC: 31.8 G/DL (ref 31.5–36.5)
MCV RBC AUTO: 95 FL (ref 78–100)
MONOCYTES # BLD AUTO: 0.3 10E9/L (ref 0–1.3)
MONOCYTES NFR BLD AUTO: 8.7 %
NEUTROPHILS # BLD AUTO: 2.3 10E9/L (ref 1.6–8.3)
NEUTROPHILS NFR BLD AUTO: 69.1 %
NRBC # BLD AUTO: 0 10*3/UL
NRBC BLD AUTO-RTO: 0 /100
PLATELET # BLD AUTO: 91 10E9/L (ref 150–450)
POTASSIUM SERPL-SCNC: 4.3 MMOL/L (ref 3.4–5.3)
RBC # BLD AUTO: 2.49 10E12/L (ref 3.8–5.2)
SODIUM SERPL-SCNC: 138 MMOL/L (ref 133–144)
SPECIMEN EXP DATE BLD: NORMAL
WBC # BLD AUTO: 3.3 10E9/L (ref 4–11)

## 2019-08-01 PROCEDURE — 25000128 H RX IP 250 OP 636: Performed by: ANESTHESIOLOGY

## 2019-08-01 PROCEDURE — 40000305 ZZH STATISTIC PRE PROC ASSESS I: Performed by: UROLOGY

## 2019-08-01 PROCEDURE — 99232 SBSQ HOSP IP/OBS MODERATE 35: CPT | Performed by: INTERNAL MEDICINE

## 2019-08-01 PROCEDURE — 12000011 ZZH R&B MS OVERFLOW

## 2019-08-01 PROCEDURE — 25000132 ZZH RX MED GY IP 250 OP 250 PS 637: Mod: GY | Performed by: INTERNAL MEDICINE

## 2019-08-01 PROCEDURE — 97116 GAIT TRAINING THERAPY: CPT | Mod: GP | Performed by: PHYSICAL THERAPIST

## 2019-08-01 PROCEDURE — 71000027 ZZH RECOVERY PHASE 2 EACH 15 MINS: Performed by: UROLOGY

## 2019-08-01 PROCEDURE — 86900 BLOOD TYPING SEROLOGIC ABO: CPT | Performed by: INTERNAL MEDICINE

## 2019-08-01 PROCEDURE — 86901 BLOOD TYPING SEROLOGIC RH(D): CPT | Performed by: INTERNAL MEDICINE

## 2019-08-01 PROCEDURE — 25000128 H RX IP 250 OP 636: Performed by: UROLOGY

## 2019-08-01 PROCEDURE — 86850 RBC ANTIBODY SCREEN: CPT | Performed by: INTERNAL MEDICINE

## 2019-08-01 PROCEDURE — 25000132 ZZH RX MED GY IP 250 OP 250 PS 637: Mod: GY | Performed by: UROLOGY

## 2019-08-01 PROCEDURE — 0TJB8ZZ INSPECTION OF BLADDER, VIA NATURAL OR ARTIFICIAL OPENING ENDOSCOPIC: ICD-10-PCS | Performed by: UROLOGY

## 2019-08-01 PROCEDURE — 83735 ASSAY OF MAGNESIUM: CPT | Performed by: HOSPITALIST

## 2019-08-01 PROCEDURE — 80048 BASIC METABOLIC PNL TOTAL CA: CPT | Performed by: HOSPITALIST

## 2019-08-01 PROCEDURE — 52000 CYSTOURETHROSCOPY: CPT | Performed by: UROLOGY

## 2019-08-01 PROCEDURE — 36415 COLL VENOUS BLD VENIPUNCTURE: CPT | Performed by: INTERNAL MEDICINE

## 2019-08-01 PROCEDURE — 99207 ZZC CDG-MDM COMPONENT: MEETS LOW - DOWN CODED: CPT | Performed by: INTERNAL MEDICINE

## 2019-08-01 PROCEDURE — 97530 THERAPEUTIC ACTIVITIES: CPT | Mod: GP | Performed by: PHYSICAL THERAPIST

## 2019-08-01 PROCEDURE — 25000125 ZZHC RX 250: Performed by: UROLOGY

## 2019-08-01 PROCEDURE — 97161 PT EVAL LOW COMPLEX 20 MIN: CPT | Mod: GP | Performed by: PHYSICAL THERAPIST

## 2019-08-01 PROCEDURE — 25000132 ZZH RX MED GY IP 250 OP 250 PS 637: Mod: GY | Performed by: HOSPITALIST

## 2019-08-01 PROCEDURE — 37000008 ZZH ANESTHESIA TECHNICAL FEE, 1ST 30 MIN: Performed by: UROLOGY

## 2019-08-01 PROCEDURE — 37000009 ZZH ANESTHESIA TECHNICAL FEE, EACH ADDTL 15 MIN: Performed by: UROLOGY

## 2019-08-01 PROCEDURE — 25000128 H RX IP 250 OP 636: Performed by: NURSE ANESTHETIST, CERTIFIED REGISTERED

## 2019-08-01 PROCEDURE — 36000050 ZZH SURGERY LEVEL 2 1ST 30 MIN: Performed by: UROLOGY

## 2019-08-01 PROCEDURE — 85025 COMPLETE CBC W/AUTO DIFF WBC: CPT | Performed by: HOSPITALIST

## 2019-08-01 PROCEDURE — 27210794 ZZH OR GENERAL SUPPLY STERILE: Performed by: UROLOGY

## 2019-08-01 PROCEDURE — 25800030 ZZH RX IP 258 OP 636: Performed by: NURSE ANESTHETIST, CERTIFIED REGISTERED

## 2019-08-01 PROCEDURE — 36415 COLL VENOUS BLD VENIPUNCTURE: CPT | Performed by: HOSPITALIST

## 2019-08-01 RX ORDER — MEPERIDINE HYDROCHLORIDE 50 MG/ML
12.5 INJECTION INTRAMUSCULAR; INTRAVENOUS; SUBCUTANEOUS
Status: DISCONTINUED | OUTPATIENT
Start: 2019-08-01 | End: 2019-08-01 | Stop reason: HOSPADM

## 2019-08-01 RX ORDER — HYDROMORPHONE HYDROCHLORIDE 1 MG/ML
.3-.5 INJECTION, SOLUTION INTRAMUSCULAR; INTRAVENOUS; SUBCUTANEOUS EVERY 10 MIN PRN
Status: DISCONTINUED | OUTPATIENT
Start: 2019-08-01 | End: 2019-08-01 | Stop reason: HOSPADM

## 2019-08-01 RX ORDER — ONDANSETRON 2 MG/ML
4 INJECTION INTRAMUSCULAR; INTRAVENOUS EVERY 30 MIN PRN
Status: DISCONTINUED | OUTPATIENT
Start: 2019-08-01 | End: 2019-08-01 | Stop reason: HOSPADM

## 2019-08-01 RX ORDER — NALOXONE HYDROCHLORIDE 0.4 MG/ML
.1-.4 INJECTION, SOLUTION INTRAMUSCULAR; INTRAVENOUS; SUBCUTANEOUS
Status: DISCONTINUED | OUTPATIENT
Start: 2019-08-01 | End: 2019-08-01 | Stop reason: HOSPADM

## 2019-08-01 RX ORDER — ACETAMINOPHEN 325 MG/1
975 TABLET ORAL ONCE
Status: DISCONTINUED | OUTPATIENT
Start: 2019-08-01 | End: 2019-08-01 | Stop reason: HOSPADM

## 2019-08-01 RX ORDER — LABETALOL 20 MG/4 ML (5 MG/ML) INTRAVENOUS SYRINGE
10
Status: DISCONTINUED | OUTPATIENT
Start: 2019-08-01 | End: 2019-08-01 | Stop reason: HOSPADM

## 2019-08-01 RX ORDER — PROPOFOL 10 MG/ML
INJECTION, EMULSION INTRAVENOUS PRN
Status: DISCONTINUED | OUTPATIENT
Start: 2019-08-01 | End: 2019-08-01

## 2019-08-01 RX ORDER — SODIUM CHLORIDE, SODIUM LACTATE, POTASSIUM CHLORIDE, CALCIUM CHLORIDE 600; 310; 30; 20 MG/100ML; MG/100ML; MG/100ML; MG/100ML
INJECTION, SOLUTION INTRAVENOUS CONTINUOUS PRN
Status: DISCONTINUED | OUTPATIENT
Start: 2019-08-01 | End: 2019-08-01

## 2019-08-01 RX ORDER — CIPROFLOXACIN 250 MG/1
250 TABLET, FILM COATED ORAL ONCE
Status: COMPLETED | OUTPATIENT
Start: 2019-08-01 | End: 2019-08-01

## 2019-08-01 RX ORDER — FENTANYL CITRATE 50 UG/ML
25-50 INJECTION, SOLUTION INTRAMUSCULAR; INTRAVENOUS
Status: DISCONTINUED | OUTPATIENT
Start: 2019-08-01 | End: 2019-08-01

## 2019-08-01 RX ORDER — FENTANYL CITRATE 50 UG/ML
25-50 INJECTION, SOLUTION INTRAMUSCULAR; INTRAVENOUS
Status: DISCONTINUED | OUTPATIENT
Start: 2019-08-01 | End: 2019-08-01 | Stop reason: HOSPADM

## 2019-08-01 RX ORDER — SODIUM CHLORIDE, SODIUM LACTATE, POTASSIUM CHLORIDE, CALCIUM CHLORIDE 600; 310; 30; 20 MG/100ML; MG/100ML; MG/100ML; MG/100ML
INJECTION, SOLUTION INTRAVENOUS CONTINUOUS
Status: DISCONTINUED | OUTPATIENT
Start: 2019-08-01 | End: 2019-08-01 | Stop reason: HOSPADM

## 2019-08-01 RX ORDER — LOPERAMIDE HCL 2 MG
2 CAPSULE ORAL 4 TIMES DAILY PRN
Status: DISCONTINUED | OUTPATIENT
Start: 2019-08-01 | End: 2019-08-02 | Stop reason: HOSPADM

## 2019-08-01 RX ORDER — ONDANSETRON 4 MG/1
4 TABLET, ORALLY DISINTEGRATING ORAL EVERY 30 MIN PRN
Status: DISCONTINUED | OUTPATIENT
Start: 2019-08-01 | End: 2019-08-01 | Stop reason: HOSPADM

## 2019-08-01 RX ADMIN — GABAPENTIN 200 MG: 100 CAPSULE ORAL at 20:31

## 2019-08-01 RX ADMIN — PROPOFOL 20 MG: 10 INJECTION, EMULSION INTRAVENOUS at 14:12

## 2019-08-01 RX ADMIN — CIPROFLOXACIN HYDROCHLORIDE 250 MG: 250 TABLET, FILM COATED ORAL at 18:18

## 2019-08-01 RX ADMIN — ISOSORBIDE DINITRATE 10 MG: 10 TABLET ORAL at 20:32

## 2019-08-01 RX ADMIN — GABAPENTIN 200 MG: 100 CAPSULE ORAL at 08:52

## 2019-08-01 RX ADMIN — LOPERAMIDE HYDROCHLORIDE 2 MG: 2 CAPSULE ORAL at 10:49

## 2019-08-01 RX ADMIN — PROPOFOL 20 MG: 10 INJECTION, EMULSION INTRAVENOUS at 14:33

## 2019-08-01 RX ADMIN — PROPOFOL 10 MG: 10 INJECTION, EMULSION INTRAVENOUS at 14:16

## 2019-08-01 RX ADMIN — AMLODIPINE BESYLATE 10 MG: 10 TABLET ORAL at 08:52

## 2019-08-01 RX ADMIN — PROPOFOL 20 MG: 10 INJECTION, EMULSION INTRAVENOUS at 14:14

## 2019-08-01 RX ADMIN — PROPOFOL 20 MG: 10 INJECTION, EMULSION INTRAVENOUS at 14:18

## 2019-08-01 RX ADMIN — SODIUM CHLORIDE, POTASSIUM CHLORIDE, SODIUM LACTATE AND CALCIUM CHLORIDE: 600; 310; 30; 20 INJECTION, SOLUTION INTRAVENOUS at 14:12

## 2019-08-01 RX ADMIN — METOPROLOL SUCCINATE 100 MG: 100 TABLET, EXTENDED RELEASE ORAL at 08:52

## 2019-08-01 RX ADMIN — CLONIDINE HYDROCHLORIDE 0.1 MG: 0.1 TABLET ORAL at 08:52

## 2019-08-01 RX ADMIN — FENTANYL CITRATE 25 MCG: 50 INJECTION INTRAMUSCULAR; INTRAVENOUS at 15:10

## 2019-08-01 RX ADMIN — ISOSORBIDE DINITRATE 10 MG: 10 TABLET ORAL at 08:52

## 2019-08-01 RX ADMIN — POTASSIUM CHLORIDE AND SODIUM CHLORIDE 100 ML/HR: 900; 150 INJECTION, SOLUTION INTRAVENOUS at 21:51

## 2019-08-01 RX ADMIN — PROPOFOL 20 MG: 10 INJECTION, EMULSION INTRAVENOUS at 14:24

## 2019-08-01 RX ADMIN — PROPOFOL 20 MG: 10 INJECTION, EMULSION INTRAVENOUS at 14:27

## 2019-08-01 RX ADMIN — SERTRALINE HYDROCHLORIDE 100 MG: 100 TABLET ORAL at 08:52

## 2019-08-01 RX ADMIN — PROPOFOL 20 MG: 10 INJECTION, EMULSION INTRAVENOUS at 14:29

## 2019-08-01 RX ADMIN — CLONIDINE HYDROCHLORIDE 0.1 MG: 0.1 TABLET ORAL at 20:31

## 2019-08-01 RX ADMIN — PROPOFOL 20 MG: 10 INJECTION, EMULSION INTRAVENOUS at 14:31

## 2019-08-01 RX ADMIN — TAMSULOSIN HYDROCHLORIDE 0.4 MG: 0.4 CAPSULE ORAL at 08:52

## 2019-08-01 RX ADMIN — PROPOFOL 20 MG: 10 INJECTION, EMULSION INTRAVENOUS at 14:21

## 2019-08-01 ASSESSMENT — ACTIVITIES OF DAILY LIVING (ADL)
TRANSFERRING: 0-->INDEPENDENT
RETIRED_EATING: 0-->INDEPENDENT
AMBULATION: 0-->INDEPENDENT
FALL_HISTORY_WITHIN_LAST_SIX_MONTHS: NO
SWALLOWING: 0-->SWALLOWS FOODS/LIQUIDS WITHOUT DIFFICULTY
COGNITION: 0 - NO COGNITION ISSUES REPORTED
DRESS: 0-->INDEPENDENT
RETIRED_COMMUNICATION: 0-->UNDERSTANDS/COMMUNICATES WITHOUT DIFFICULTY
TOILETING: 0-->INDEPENDENT
BATHING: 0-->INDEPENDENT

## 2019-08-01 NOTE — PLAN OF CARE
PT: Orders received. PT evaluation completed and treatment initiated. Pt reports living indep in an apartment. Pt doesn't identify any concerns with her current living situation. Pt reports that she ambulates with a FWW in her apartment. Receives assist for cleaning.     Discharge Planner PT   Patient plan for discharge: Unable to state clear plan  Current status: Pt supine upon initiation, agreeable to session. Pt completes sit<>supine with close SBA and multiple cues for safe technique. Pt completes sit<>stand with SBA and cues for safe technique. Requires cuing to wait for walker before ambulation as pt is attempting to ambulate without any assistive device.Pt requires cuing to complete kayce cares after toileting. Heavy reliance on grab bars for toilet transfer. Pt ambulates 40' with FWW and close SBA with multiple cues for safety/obstacle avoidance.  Pt sitting in bedside chair at end of session, all needs in reach. Fatigue noted.   Barriers to return to prior living situation: Impaired safety awareness, requires close SBA/CGA for all mobility skills, decreased activity tolerance  Recommendations for discharge: TCU   Rationale for recommendations: Pt is not currently at baseline for mobility, and is unsafe to discharge home. With continued PT, both IP and after discharge, pt is likely to obtain mobility goals. Anticipate pt would benefit from more supportive living environment after TCU stay given multiple admissions this year.       Entered by: Jolie Bolden 08/01/2019 10:54 AM

## 2019-08-01 NOTE — PROGRESS NOTES
[facility]  Infectious Disease Progress Note          Assessment and Plan:   IMPRESSION:   1.  An 88-year-old female with a complicated recent past medical history including multiple treatments for ESBL E. coli urinary tract infection.  She was seen by Dr. Camejo in 07/2019.  At that time she had been diagnosed with ESBL UTI, sensitive to Septra, with which she was treated.  She then returned with a sulfa resistant strain of the ESBL E. coli.  At that time she received a course of IV meropenem.     2.  Earlier this month, patient again was treated with meropenem for fewer than 10 to the 5th colonies of ESBL E. coli.     3.  The patient was readmitted 07/22 for nausea and vomiting.  At that time, urinalysis showed 31 white blood cells and urine culture grew greater than 100,000 colonies of ESBL E. coli.  This was not treated.     4.  The patient returns to the hospital on this occasion with complaint of vaginal pain, dysuria, weakness and nausea.  Her maximal temperature has been 100.4 degrees and white blood count was elevated to 11,300.  I am not convinced that the patient's symptoms at present are secondary to urinary tract infection as her current urinalysis shows 3 white blood cells, 1 red blood cell, and urine and blood cultures are negative.  It is possible that the patient may be colonized with ESBL E. coli and that her symptoms are explained by something else.  The patient did have renal ultrasound done about 2 weeks ago which did not show any perinephric abscess.   5. Urinary retension    RECOMMENDATIONS:   1.  cont to observe off abs  2.  Getting cysto today.        Interval History:   Now off abs.  Remains afebrile.  WBC remains normal.  No new pos cxs.  In cysto at present.              Medications:       [Auto Hold] amLODIPine  10 mg Oral Daily     [Auto Hold] cloNIDine  0.1 mg Oral BID     [Auto Hold] gabapentin  200 mg Oral BID     [Auto Hold] isosorbide dinitrate  10 mg Oral  "TID     [Auto Hold] metoprolol succinate ER  100 mg Oral Daily     [Auto Hold] sertraline  100 mg Oral Daily     [Auto Hold] tamsulosin  0.4 mg Oral Daily                  Physical Exam:   Blood pressure (!) 143/66, pulse 64, temperature 97.6  F (36.4  C), temperature source Temporal, resp. rate 16, height 1.549 m (5' 1\"), weight 52.6 kg (116 lb), SpO2 100 %, not currently breastfeeding.  [unfilled]  Vital Signs with Ranges  Temp:  [95.3  F (35.2  C)-97.6  F (36.4  C)] 97.6  F (36.4  C)  Pulse:  [64] 64  Heart Rate:  [64-70] 64  Resp:  [16-18] 16  BP: (111-161)/(45-66) 143/66  SpO2:  [96 %-100 %] 100 %    Constitutional: Awake, alert, cooperative, no apparent distress   Lungs: Clear to auscultation bilaterally, no crackles or wheezing   Cardiovascular: Regular rate and rhythm, normal S1 and S2, and no murmur noted   Abdomen: Normal bowel sounds, soft, non-distended, non-tender   Skin: No rashes, no cyanosis, no edema   Other:           Data:   All microbiology laboratory data reviewed.  Recent Labs   Lab Test 08/01/19  0625 07/31/19  0536 07/30/19  1311   WBC 3.3* 4.7 6.3   HGB 7.5* 7.5* 8.6*   HCT 23.6* 22.9* 26.7*   MCV 95 93 96   PLT 91* 93* 106*     Recent Labs   Lab Test 08/01/19  0625 07/31/19  0536 07/30/19  0547   CR 0.60 0.72 1.02     No lab results found.  "

## 2019-08-01 NOTE — PROGRESS NOTES
[facility]  Hospitalist Progress Note  Amanda Kenyon MD 08/01/19  Text Page  Pager: [phone] (7am-6pm)    Reason for Stay (Diagnosis): weakness, nausea         Assessment and Plan:      Summary of Stay: Daya Ignacio is a 88 year old woman with past medical history significant for chronic back pain, 2 recent hospitalizations for ESBL UTI, recent hospitalizations for ARF (due to meds) and n/v, recent GI bleed, and recent C. difficile treatment.  Patient was recently hospitalized 5/26 through 5/31 for ESBL UTI treated with Bactrim and again 6/10 through 6/16 for ESBL UTI (now resistant to Bactrim and treated with meropenem), and again 7/4-7/7 treated with meropenem. Patient admitted 7/28/2019 with symptoms of dysuria, weakness and nausea after a urine culture from 7/22 grew ESBL and was not treated (had been admitted with n/v).  Initially started on antibiotic therapy ID was consulted.  Her urine culture this admission was negative and antibiotics now discontinued.  She now has intermittent urinary retention and will undergo cystoscopy today.  Will ultimately need TCU at discharge.    Problem List/Assessment and Plan:     1. History of ESBL E coli UTI - No current evidence of infection: Patient was hospitalized here from 7/22-7/23 for nausea which resolved by the time she was admitted.  She had a UA which showed possible UTI but antibiotics were not continued as it was not felt to represent a true infection.  Ultimately urinary culture showed >100K ESBL E coli, she has not been on antibiotics for this.  She developed nausea, weakness and dysuria.    She received ertapenem in the ER and then was admitted on Macrobid.  She did develop a low-grade fever and was subsequently restarted on meropenem.  ID was consulted given history of recurrent infections.  Her urine culture this admission is negative so meropenem was discontinued.  It is unclear why she had fevers and leukocytosis but work-up has  otherwise been negative.  -ID consulted, appreciate recommendations  -Continue to hold antibiotics and monitor    2.  Recurrent UTI versus colonization of ESBL E. coli in the setting of urinary retention: Patient was seen by urology and will have cystoscopy today.  She is requiring intermittent catheterizations.  She was started on Flomax and will also continue with this.  -Urology consulted, appreciate recommendations  -N.p.o. for cystoscopy today  -Continue Flomax  -Intermittent catheterization as needed    3.  Vaginal pain: Patient reports vaginal complain.  Transvaginal ultrasound was done which showed no cause of her vaginal pain (has a left ovarian simple cyst otherwise normal).  Nothing on exam was found by prior provider.  She can follow-up with GYN in the outpatient setting if this pain persists.    4.  New diarrhea with history of recent C. difficile infection: Patient was treated in early July for C. difficile.  She was treated with oral vancomycin for short time while she was on meropenem (prophylactic dosing).  She developed diarrhea but C. difficile is negative.  Diarrhea may be from the antibiotics she received earlier in the admission.  She can take Imodium as needed for diarrhea.  -PRN Imodium    5.  Coronary artery disease (history of an STEMI 5/2017), Hypertension, History of congestive heart failure with preserved ejection fraction, History of moderate pulmonary hypertension    - Continue PTA Imdur, metoprolol, lisinopril, and clonidine.    - Patient does not appear volume overloaded.  She is not on any diuretics.    - Patient is notably no longer on aspirin.     6.  Depression: Continue Zoloft.     7.  Anemia recent GI bleed: Hemoglobin was 11 on day of admission.  Was significantly lower in the setting of a lower GI bleed recently (mid July).  Prior GIB was felt to be diverticular or related to her C. difficile infection.  Her hemoglobin has been drifting down throughout her admission, today is  7.5 but remained stable.  -CBC tomorrow morning     8.  Thrombocytopenia: Platelets were normal on admission and have decreased to the 100 range.  No bleeding.  She did receive 1 dose of enoxaparin on 7/29 but otherwise no anticoagulants.  We will check a peripheral smear but otherwise continue to follow.  -Check peripheral smear     9.  Macular degeneration: Patient follows with a retina specialist at [facility].  She has noticed that her vision has been worsening recently.  Her son is going to call the eye clinic on Monday to get her an appointment to be seen.    10.  Diffuse weakness: Patient has been hospitalized numerous times over the past several months.  She has become quite deconditioned.  PT did assess and has recommended TCU when ready for discharge.  -Social work and PT consulted    Diet: NPO per Anesthesia Guidelines for Procedure/Surgery Except for: Meds    DVT Prophylaxis: Pneumatic Compression Devices  Almanza Catheter: not present  Code Status: Full Code      Disposition Plan   Expected discharge: 1-2 days, recommended to transitional care unit once tolerating diet, urological plan.  Entered: Amanda Kenyon MD 08/01/2019, 10:35 AM       The patient's care was discussed with the Bedside Nurse, Care Coordinator/, Patient and Patient's Family.    Amanda Kenyon MD  Hospitalist Service  [facility]          Interval History (Subjective):      Patient states that she is feeling well this morning.  She took a shower but did feel fatigued after this.  She has some nausea this morning but she attributes this to not being able to eat in order to have her cystoscopy later today.  No emesis.  Denies chest pain or shortness of breath.  Still having some intermittent vaginal pain.  No abdominal pain.    I called the patient's son, Gael, and left a message with an update on the care plan.                  Physical Exam:      Last Vital Signs:  BP (!) 157/55 (BP Location:  "Left arm)   Pulse 64   Temp 96  F (35.6  C) (Oral)   Resp 18   Ht 1.549 m (5' 1\")   Wt 52.6 kg (116 lb)   SpO2 98%   BMI 21.92 kg/m      General: Alert, awake, no acute distress.  HEENT: NC/AT, eyes anicteric and without injection, EOMI, face symmetric.  Dentition WNL, MMM.  Cardiac: RRR, normal S1, S2.  No murmurs/g/r.  No LE edema  Pulmonary: Normal chest rise, normal work of breathing.  Lungs CTAB  Abdomen: soft, non-tender, non-distended.  Normoactive BS.  No guarding or rebound tenderness.  Extremities: no deformities.  Warm, well perfused.  Skin: no rashes or lesions noted.  Warm and Dry.  Neuro: No focal deficits noted.  Speech clear.  Coordination and strength grossly normal.  Psych: Appropriate affect. Alert and oriented x3         Medications:      All current medications were reviewed with changes reflected in problem list.         Data:      All new lab and imaging data was reviewed.   Labs:  Recent Labs   Lab 08/01/19  0625 07/31/19  1504 07/31/19  0536 07/30/19  0547     --  134 134   POTASSIUM 4.3 4.0 3.3* 3.6   CHLORIDE 111*  --  105 104   CO2 23  --  23 24   ANIONGAP 4  --  6 6   GLC 87  --  92 94   BUN 17  --  28 27   CR 0.60  --  0.72 1.02   GFRESTIMATED 81  --  74 49*   GFRESTBLACK >90  --  86 57*   DAVE 8.0*  --  8.1* 8.0*     Recent Labs   Lab 08/01/19  0625 07/31/19  0536 07/30/19  1311   WBC 3.3* 4.7 6.3   HGB 7.5* 7.5* 8.6*   HCT 23.6* 22.9* 26.7*   MCV 95 93 96   PLT 91* 93* 106*      Imaging:   No results found for this or any previous visit (from the past 24 hour(s)).    Amanda Kenyon MD.         "

## 2019-08-01 NOTE — ANESTHESIA CARE TRANSFER NOTE
Patient: Daya Ignacio    Procedure(s):  Video Cystoscopy, Exam Under Anesthesia    Diagnosis: gross hematuria  Diagnosis Additional Information: No value filed.    Anesthesia Type:   General, LMA     Note:  Airway :Face Mask  Patient transferred to:PACU  Handoff Report: Identifed the Patient, Identified the Reponsible Provider, Reviewed the pertinent medical history, Discussed the surgical course, Reviewed Intra-OP anesthesia mangement and issues during anesthesia, Set expectations for post-procedure period and Allowed opportunity for questions and acknowledgement of understanding      Vitals: (Last set prior to Anesthesia Care Transfer)    CRNA VITALS  8/1/2019 1407 - 8/1/2019 1447      8/1/2019             EKG:  Sinus rhythm                Electronically Signed By: Dean Dennis Severson, APRN CRNA  August 1, 2019  2:47 PM

## 2019-08-01 NOTE — PROGRESS NOTES
08/01/19 1051   Quick Adds   Type of Visit Initial PT Evaluation   Living Environment   Lives With facility resident   Living Arrangements independent living facility   Home Accessibility no concerns   Transportation Anticipated family or friend will provide   Self-Care   Usual Activity Tolerance good   Current Activity Tolerance moderate   Regular Exercise No   Equipment Currently Used at Home walker, rolling   Functional Level Prior   Ambulation 1-->assistive equipment   Transferring 1-->assistive equipment   Toileting 1-->assistive equipment   Bathing 0-->independent   Fall history within last six months yes   Number of times patient has fallen within last six months 2   Which of the above functional risks had a recent onset or change? ambulation;transferring;toileting;bathing;dressing  (activity tolerance)   General Information   Onset of Illness/Injury or Date of Surgery - Date 07/28/19   Referring Physician Juliano Mckeon MD   Patient/Family Goals Statement Unable to state clear plan/goal   Pertinent History of Current Problem (include personal factors and/or comorbidities that impact the POC) Daya Ignacio is a 88 year old woman with past medical history significant for chronic back pain, 2 recent hospitalizations for ESBL UTI, recent hospitalizations for ARF (due to meds) and n/v, recent GI bleed, and recent C. difficile treatment.  Patient was recently hospitalized 5/26 through 5/31 for ESBL UTI treated with Bactrim and again 6/10 through 6/16 for ESBL UTI (now resistant to Bactrim and treated with meropenem), and again 7/4-7/7 treated with meropenem. Patient admitted 7/28/2019 with symptoms of dysuria, weakness and nausea after a urine culture from 7/22 grew ESBL and was not treated (had been admitted with n/v).  Initially started on antibiotic therapy ID was consulted.  Her urine culture this admission was negative and antibiotics now discontinued.  She now has intermittent urinary retention and  will undergo cystoscopy today.  Will ultimately need TCU at discharge.   Precautions/Limitations fall precautions   Weight-Bearing Status - LLE full weight-bearing   Weight-Bearing Status - RLE full weight-bearing   General Observations Pt supine upon initiation, agreeable to session.    Cognitive Status Examination   Orientation orientation to person, place and time   Level of Consciousness alert   Follows Commands and Answers Questions 100% of the time   Personal Safety and Judgment intact   Memory intact   Pain Assessment   Patient Currently in Pain No   Integumentary/Edema   Integumentary/Edema no deficits were identifed   Posture    Posture Forward head position;Protracted shoulders   Range of Motion (ROM)   ROM Comment LE ROM WNL   Strength   Strength Comments Decreased functinoal strength as pt with heavy reliance on UEs for transfers   Bed Mobility   Bed Mobility Comments sit<>supine with close SBA   Transfer Skills   Transfer Comments sit<>stand with SBA   Gait   Gait Comments SBA with FWW with multiple cues for safety   Balance   Balance Comments Requires B UE support for safe dynamic mobility   Sensory Examination   Sensory Perception no deficits were identified   Coordination   Coordination no deficits were identified   Muscle Tone   Muscle Tone no deficits were identified   General Therapy Interventions   Planned Therapy Interventions bed mobility training;gait training;strengthening;stretching;transfer training;home program guidelines;progressive activity/exercise   Clinical Impression   Criteria for Skilled Therapeutic Intervention yes, treatment indicated   PT Diagnosis Impaired functional mobility   Influenced by the following impairments Weakness, deconditioning   Functional limitations due to impairments Difficulty with bed mobility, transfers, ambulation   Clinical Presentation Stable/Uncomplicated   Clinical Presentation Rationale clear medical POC, DC 1-2 days   Clinical Decision Making  "(Complexity) Low complexity   Therapy Frequency 3x/week   Predicted Duration of Therapy Intervention (days/wks) 3 days   Anticipated Discharge Disposition Transitional Care Facility   Risk & Benefits of therapy have been explained Yes   Patient, Family & other staff in agreement with plan of care Yes   [facility] TM \"6 Clicks\"   2016, Trustees of [employer], under license to [employer].  All rights reserved.   6 Clicks Short Forms Basic Mobility Inpatient Short Form   [employer] AM-PAC  \"6 Clicks\" V.2 Basic Mobility Inpatient Short Form   1. Turning from your back to your side while in a flat bed without using bedrails? 4 - None   2. Moving from lying on your back to sitting on the side of a flat bed without using bedrails? 3 - A Little   3. Moving to and from a bed to a chair (including a wheelchair)? 3 - A Little   4. Standing up from a chair using your arms (e.g., wheelchair, or bedside chair)? 3 - A Little   5. To walk in hospital room? 3 - A Little   6. Climbing 3-5 steps with a railing? 2 - A Lot   Basic Mobility Raw Score (Score out of 24.Lower scores equate to lower levels of function) 18   Total Evaluation Time   Total Evaluation Time (Minutes) 8     "

## 2019-08-01 NOTE — CONSULTS
Care Transition Initial Assessment - SW     Met with: Patient, spoke with ojse Mayo via phone.  Active Problems:    UTI (urinary tract infection)    Sepsis secondary to UTI (H)       DATA  Lives With: alone      Quality of Family Relationships: involved, helpful, supportive  Description of Support System: Supportive, Involved  Who is your support system?: Children  Support Assessment: Adequate family and caregiver support, Adequate social supports.   Identified issues/concerns regarding health management:  Pt admitted under observation for a UTI and sepsis. Flipped to Inpt 7/29. Pt resides at the [facility] and has HC RN & PT. Pt has has 10+ ED visits and admissions since June related to UTI's and back/abdominal pain. At baseline, pt ambulates with a 4ww. She receives assistance with cleaning. PT has evaluated pt and recommended TCU at discharge. Pt requested referrals be sent to [facility], [facility], and [facility] for a private room. Cysto planned for today. Anticipated discharge in 1-2 days.         Quality of Family Relationships: involved, helpful, supportive       ASSESSMENT  Cognitive Status:  Awake, alert and oriented  Concerns to be addressed: Discharge planning. PT rec TCU.      PLAN  Financial costs for the patient includes: Prvt room fee at TCU.  Patient given options and choices for discharge: Yes.  Patient/family is agreeable to the plan?  Yes: Pt and jose Mayo agreeable to TCU.  Transportation/person available to transport on day of discharge  is jose Mayo.  Patient Goals and Preferences: TCU.  Patient anticipates discharging to:  TCU-referrals sent for prvt room. Discharge anticipated in 1-2 days. PAS needed. Jose Mayo to transport at discharge. SW to continue following.

## 2019-08-01 NOTE — ANESTHESIA PREPROCEDURE EVALUATION
Anesthesia Pre-Procedure Evaluation    Patient: Daya Ignacio   MRN: [id number] : 10/11/1930          Preoperative Diagnosis: gross hematuria    Procedure(s):  videocystoscopy    Past Medical History:   Diagnosis Date     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 2014     Closed fracture of unspecified part of neck of femur      Closed fracture of unspecified part of vertebral column without mention of spinal cord injury     from fall summer 06     Clostridium difficile colitis 2019     Hypertension      Lumbar compression fracture (H)      Macular degeneration (senile) of retina, unspecified      Upper GI bleed 2019     Urinary tract infection due to extended-spectrum beta lactamase (ESBL)-producing Klebsiella 2019    resistant to Bactrim     Past Surgical History:   Procedure Laterality Date     C NONSPECIFIC PROCEDURE      lt hip pain     ESOPHAGOSCOPY, GASTROSCOPY, DUODENOSCOPY (EGD), COMBINED N/A 2019    Procedure: ESOPHAGOGASTRODUODENOSCOPY (EGD);  Surgeon: Gaurav Degroot MD;  Location:  GI     ORTHOPEDIC SURGERY       Anesthesia Evaluation     .             ROS/MED HX    ENT/Pulmonary:  - neg pulmonary ROS     Neurologic:  - neg neurologic ROS   (+)neuropathy - b/l toes, intermittent b/l fingers,     Cardiovascular:     (+) hypertension----. : . . . :. .      (-) angina, syncope and angina   METS/Exercise Tolerance:     Hematologic:     (+) Anemia, -      Musculoskeletal:         GI/Hepatic:  - neg GI/hepatic ROS      (-) GERD   Renal/Genitourinary:     (+) Pt does not require dialysis, Other Renal/ Genitourinary, recurrent UTIs      Endo:         Psychiatric:     (+) psychiatric history anxiety      Infectious Disease:         Malignancy:         Other:                          Physical Exam  Normal systems: cardiovascular, pulmonary and dental    Airway   Mallampati: II  TM distance: >3 FB  Neck ROM: full    Dental     Cardiovascular       Pulmonary             Lab Results  "  Component Value Date    WBC 3.3 (L) 08/01/2019    HGB 7.5 (L) 08/01/2019    HCT 23.6 (L) 08/01/2019    PLT 91 (L) 08/01/2019    CRP 1.1 07/09/2007    SED 10 07/09/2007     08/01/2019    POTASSIUM 4.3 08/01/2019    CHLORIDE 111 (H) 08/01/2019    CO2 23 08/01/2019    BUN 17 08/01/2019    CR 0.60 08/01/2019    GLC 87 08/01/2019    DAVE 8.0 (L) 08/01/2019    PHOS 5.5 (H) 07/15/2019    MAG 1.5 (L) 08/01/2019    ALBUMIN 3.6 07/22/2019    PROTTOTAL 7.0 07/22/2019    ALT 16 07/22/2019    AST 13 07/22/2019    ALKPHOS 83 07/22/2019    BILITOTAL 0.8 07/22/2019    LIPASE 41 (L) 05/26/2019    AMYLASE 361 (H) 06/25/2011    PTT 29 06/23/2011    INR 1.18 (H) 06/20/2017    FIBR 177 (L) 06/23/2011    TSH 0.91 08/23/2013       Preop Vitals  BP Readings from Last 3 Encounters:   08/01/19 (!) 143/66   07/23/19 113/55   07/19/19 186/71    Pulse Readings from Last 3 Encounters:   07/31/19 64   07/22/19 76   07/18/19 76      Resp Readings from Last 3 Encounters:   08/01/19 16   07/23/19 16   07/19/19 20    SpO2 Readings from Last 3 Encounters:   08/01/19 100%   07/23/19 96%   07/19/19 96%      Temp Readings from Last 1 Encounters:   08/01/19 97.6  F (36.4  C) (Temporal)    Ht Readings from Last 1 Encounters:   07/28/19 1.549 m (5' 1\")      Wt Readings from Last 1 Encounters:   07/28/19 52.6 kg (116 lb)    Estimated body mass index is 21.92 kg/m  as calculated from the following:    Height as of this encounter: 1.549 m (5' 1\").    Weight as of this encounter: 52.6 kg (116 lb).       Anesthesia Plan      History & Physical Review  History and physical reviewed and following examination; no interval change.    ASA Status:  2 .    NPO Status:  > 8 hours    Plan for MAC (very short procedure, under 5 min) with Intravenous induction. Maintenance will be TIVA.  Reason for MAC:  Other - see comments  PONV prophylaxis:  Ondansetron (or other 5HT-3) and Dexamethasone or Solumedrol       Postoperative Care  Postoperative pain management:  IV " analgesics and Multi-modal analgesia.      Consents  Anesthetic plan, risks, benefits and alternatives discussed with:  Patient..                 Gabe Evans MD                    .

## 2019-08-01 NOTE — OP NOTE
Procedure Date: 08/01/2019      PREOPERATIVE DIAGNOSES:  Recurrent urinary tract infections, pelvic pain.      POSTOPERATIVE DIAGNOSES:   Recurrent urinary tract infections, pelvic pain.      PROCEDURES PERFORMED:  EUA, video cystopanendoscopy.       ANESTHESIA:  IV sedation.        ESTIMATED BLOOD LOSS:  0 Ml.       INDICATIONS:  The patient is an 88-year-old female with recurrent E. coli cystitis.  She has had several hospitalizations.  Her other past medical history is significant for C. difficile colitis, coronary artery disease, chronic low back pain, low-grade fevers, recent admission for nausea and vomiting, history of depression, neuropathy, pulmonary hypertension, essential hypertension, and congestive heart failure.  The patient has been followed in the past by Juan Manuel Caballero MD and she had a normal cystoscopy in 2015.  She is unable to come to the office for exam, so will be examined under IV sedation in-house.  The procedure, the alternatives, risks and follow-up were carefully discussed with the patient.      DESCRIPTION OF PROCEDURE:   The patient was taken to cystoscopy and placed supine on the operating table.  She was given intravenous sedation and placed in lithotomy position and her genitalia were prepped and draped in a sterile fashion.  A #22 Kiswahili Storz cystoscope with obturator was gently introduced in the bladder.  Residual urine was clear.  Using the 30-degree lens and video and water as an irrigant, the urethra and bladder base were inspected with the 30 degree lens and the remainder of the bladder was examined with the 70-degree lens revealing normal mucosa, trabeculation and a floppy bladder wall, a moderate size cystocele, normal ureteral orifices.  There was specifically no urethral diverticulum, bladder diverticulum, tumor, stone or fistula.  The bladder was emptied, and the cystoscope removed. Pelvic and rectal examinations revealed no rectal mass, no vaginal mass or pelvic mass,  and a normal feeling cervix.  The patient tolerated the procedure well and went to the recovery room in stable condition and will be returned to the floor for further evaluation.         ACOSTA PAYAN JR, MD             D: 2019   T: 2019   MT:       Name:     MAGALY MCDANIELS   MRN:      [id number]        Account:        [id number]   :      10/11/1930           Procedure Date: 2019      Document: [id number]       cc: Marcy Payan Jr, MD

## 2019-08-01 NOTE — BRIEF OP NOTE
Diagnosis: Recurrent urinary tract infections, pelvic pain  Procedure: EUA, video cystopanendoscopy  Surgeon: Aldo  Anesthesia: IV sedation  EBL: 0 ml  Findings: Normal exam

## 2019-08-01 NOTE — PLAN OF CARE
A&O, hypertensive otherwise VSS on RA. Denies pain. IVF infusing. No BM's overnight. NPO since midnight. Frequently voiding large amounts overnight. ID & PT consulted. Plan for cysto in AM. Will continue to monitor.     Temp: 95.6  F (35.3  C) Temp src: Oral BP: (!) 161/48 Pulse: 64 Heart Rate: 64 Resp: 18 SpO2: 97 % O2 Device: None (Room air)

## 2019-08-01 NOTE — PROGRESS NOTES
Your information has been submitted on August 01st, 2019 at 02:43:36 PM CDT. The confirmation number is [id number]

## 2019-08-01 NOTE — ANESTHESIA POSTPROCEDURE EVALUATION
Patient: Daya Ignacio    Procedure(s):  Video Cystoscopy, Exam Under Anesthesia    Diagnosis:gross hematuria  Diagnosis Additional Information: No value filed.    Anesthesia Type:  General, LMA    Note:  Anesthesia Post Evaluation    Patient location during evaluation: PACU  Patient participation: Able to fully participate in evaluation  Level of consciousness: awake and alert  Pain management: adequate  Airway patency: patent  Cardiovascular status: acceptable  Respiratory status: acceptable  Hydration status: acceptable  PONV: none     Anesthetic complications: None          Last vitals:  Vitals:    08/01/19 1505 08/01/19 1515 08/01/19 1558   BP: (!) 159/78 (!) 153/86 (!) 145/55   Pulse: 64 65    Resp: 17 19 18   Temp:  97  F (36.1  C) 96.5  F (35.8  C)   SpO2: 93% 93% 100%         Electronically Signed By: Mervin Robertson MD  August 1, 2019  5:00 PM

## 2019-08-01 NOTE — PLAN OF CARE
"BP (!) 157/55 (BP Location: Left arm)   Pulse 64   Temp 96  F (35.6  C) (Oral)   Resp 18   Ht 1.549 m (5' 1\")   Wt 52.6 kg (116 lb)   SpO2 98%   BMI 21.92 kg/m      A&Ox4. RA. Up w/ Ax1, gait belt and walker in room. CMS intact. Patient took shower this AM, all linens changed. Patient reports burning sensation to vaginal area. Up frequently to bathroom to void. States that the burning sensation does not change while voiding, but is present constantly. Redness to perineal area. BS active, passing flatus. BM x 1 this AM (slightly loose but formed). Patient requested Imodium. IVF infusing. Infectious Disease following. PT consulted.  Remains NPO. Videocystoscopy w/ Dr. Carlson planned for 1:55 PM. Social Work following. Will continue to monitor.   "

## 2019-08-02 ENCOUNTER — PATIENT OUTREACH (OUTPATIENT)
Dept: CARE COORDINATION | Facility: CLINIC | Age: 84
End: 2019-08-02

## 2019-08-02 VITALS
SYSTOLIC BLOOD PRESSURE: 110 MMHG | TEMPERATURE: 97.8 F | HEIGHT: 61 IN | RESPIRATION RATE: 14 BRPM | WEIGHT: 116 LBS | DIASTOLIC BLOOD PRESSURE: 48 MMHG | OXYGEN SATURATION: 97 % | BODY MASS INDEX: 21.9 KG/M2 | HEART RATE: 64 BPM

## 2019-08-02 LAB
ANION GAP SERPL CALCULATED.3IONS-SCNC: 3 MMOL/L (ref 3–14)
BASOPHILS # BLD AUTO: 0 10E9/L (ref 0–0.2)
BASOPHILS NFR BLD AUTO: 0 %
BUN SERPL-MCNC: 9 MG/DL (ref 7–30)
CALCIUM SERPL-MCNC: 8.6 MG/DL (ref 8.5–10.1)
CHLORIDE SERPL-SCNC: 106 MMOL/L (ref 94–109)
CO2 SERPL-SCNC: 30 MMOL/L (ref 20–32)
COPATH REPORT: NORMAL
CREAT SERPL-MCNC: 0.56 MG/DL (ref 0.52–1.04)
DIFFERENTIAL METHOD BLD: ABNORMAL
EOSINOPHIL # BLD AUTO: 0.3 10E9/L (ref 0–0.7)
EOSINOPHIL NFR BLD AUTO: 8 %
ERYTHROCYTE [DISTWIDTH] IN BLOOD BY AUTOMATED COUNT: 14.6 % (ref 10–15)
GFR SERPL CREATININE-BSD FRML MDRD: 83 ML/MIN/{1.73_M2}
GLUCOSE SERPL-MCNC: 88 MG/DL (ref 70–99)
HCT VFR BLD AUTO: 24.1 % (ref 35–47)
HGB BLD-MCNC: 8.1 G/DL (ref 11.7–15.7)
LYMPHOCYTES # BLD AUTO: 0.6 10E9/L (ref 0.8–5.3)
LYMPHOCYTES NFR BLD AUTO: 14 %
MCH RBC QN AUTO: 31.3 PG (ref 26.5–33)
MCHC RBC AUTO-ENTMCNC: 33.6 G/DL (ref 31.5–36.5)
MCV RBC AUTO: 93 FL (ref 78–100)
MONOCYTES # BLD AUTO: 0.1 10E9/L (ref 0–1.3)
MONOCYTES NFR BLD AUTO: 2 %
NEUTROPHILS # BLD AUTO: 3 10E9/L (ref 1.6–8.3)
NEUTROPHILS NFR BLD AUTO: 76 %
OVALOCYTES BLD QL SMEAR: SLIGHT
PLATELET # BLD AUTO: 119 10E9/L (ref 150–450)
PLATELET # BLD EST: ABNORMAL 10*3/UL
POTASSIUM SERPL-SCNC: 3.7 MMOL/L (ref 3.4–5.3)
RBC # BLD AUTO: 2.59 10E12/L (ref 3.8–5.2)
RETICS # AUTO: 20.5 10E9/L (ref 25–95)
RETICS/RBC NFR AUTO: 0.8 % (ref 0.5–2)
SODIUM SERPL-SCNC: 139 MMOL/L (ref 133–144)
WBC # BLD AUTO: 4 10E9/L (ref 4–11)

## 2019-08-02 PROCEDURE — 25000132 ZZH RX MED GY IP 250 OP 250 PS 637: Mod: GY | Performed by: UROLOGY

## 2019-08-02 PROCEDURE — 40000847 ZZHCL STATISTIC MORPHOLOGY W/INTERP HISTOLOGY TC 85060: Performed by: UROLOGY

## 2019-08-02 PROCEDURE — 85025 COMPLETE CBC W/AUTO DIFF WBC: CPT | Performed by: UROLOGY

## 2019-08-02 PROCEDURE — 85045 AUTOMATED RETICULOCYTE COUNT: CPT | Performed by: UROLOGY

## 2019-08-02 PROCEDURE — 80048 BASIC METABOLIC PNL TOTAL CA: CPT | Performed by: UROLOGY

## 2019-08-02 PROCEDURE — 36415 COLL VENOUS BLD VENIPUNCTURE: CPT | Performed by: UROLOGY

## 2019-08-02 PROCEDURE — 85060 BLOOD SMEAR INTERPRETATION: CPT | Performed by: UROLOGY

## 2019-08-02 PROCEDURE — 99239 HOSP IP/OBS DSCHRG MGMT >30: CPT | Performed by: INTERNAL MEDICINE

## 2019-08-02 RX ORDER — ONDANSETRON 4 MG/1
4 TABLET, ORALLY DISINTEGRATING ORAL EVERY 6 HOURS PRN
Status: SHIPPED | OUTPATIENT
Start: 2019-08-02 | End: 2019-08-21

## 2019-08-02 RX ORDER — TAMSULOSIN HYDROCHLORIDE 0.4 MG/1
0.4 CAPSULE ORAL DAILY
Status: SHIPPED | OUTPATIENT
Start: 2019-08-03 | End: 2019-08-21

## 2019-08-02 RX ORDER — LOPERAMIDE HCL 2 MG
2 CAPSULE ORAL 4 TIMES DAILY PRN
Status: ON HOLD | OUTPATIENT
Start: 2019-08-02 | End: 2019-08-30

## 2019-08-02 RX ADMIN — SERTRALINE HYDROCHLORIDE 100 MG: 100 TABLET ORAL at 08:16

## 2019-08-02 RX ADMIN — TAMSULOSIN HYDROCHLORIDE 0.4 MG: 0.4 CAPSULE ORAL at 08:17

## 2019-08-02 RX ADMIN — ISOSORBIDE DINITRATE 10 MG: 10 TABLET ORAL at 08:16

## 2019-08-02 RX ADMIN — GABAPENTIN 200 MG: 100 CAPSULE ORAL at 08:15

## 2019-08-02 RX ADMIN — CLONIDINE HYDROCHLORIDE 0.1 MG: 0.1 TABLET ORAL at 08:15

## 2019-08-02 RX ADMIN — METOPROLOL SUCCINATE 100 MG: 100 TABLET, EXTENDED RELEASE ORAL at 08:16

## 2019-08-02 RX ADMIN — AMLODIPINE BESYLATE 10 MG: 10 TABLET ORAL at 08:17

## 2019-08-02 RX ADMIN — ISOSORBIDE DINITRATE 10 MG: 10 TABLET ORAL at 12:26

## 2019-08-02 RX ADMIN — ACETAMINOPHEN 650 MG: 325 TABLET, FILM COATED ORAL at 12:25

## 2019-08-02 NOTE — PLAN OF CARE
A&Ox4, VSS on RA. LS CTA. Denies pain. IVF infusing. No BM's overnight.  Tolerating regular diet, denies nausea. ID & PT following. Plan for possible discharge to [facility] TCU today or tomorrow. SW following. Will continue to monitor.     Temp: 97.4  F (36.3  C) Temp src: Axillary BP: 127/60 Pulse: 65 Heart Rate: 67 Resp: 16 SpO2: 95 % O2 Device: None (Room air)

## 2019-08-02 NOTE — LETTER
[facility]   To:             Please give to facility    From:   Elizabeth Simmons RN  Care Coordinator   [facility]   P: [phone]  [email or web address]   Patient Name:  Daya Ignacio YOB: 1930   Admit date: 08/02/2019      *Information Needed:  Please contact me when the patient will discharge (or if they will move to long term care)- include the discharge date, disposition, and main diagnosis   - If the patient is discharged with home care services, please provide the name of the agency    Also- Please inform me if a care conference is being held.   Phone, Fax or Email with information                    Thank you

## 2019-08-02 NOTE — PLAN OF CARE
Patient's After Visit Summary was reviewed with patient and/or son.  Patient verbalized understanding of After Visit Summary, recommended follow up and was given an opportunity to ask questions.   Discharge medications sent home with patient/family: No. Faxed to TCU   Discharged with son.    All personal belongings returned. IV access discontinued. Escorted to exit via wheelchair with staff.    Patient PASSED the Vehicle Transfer Screen    Passed:  Walker and Gait Belt/Mobility    Failed:  Care Transitions Notified No, Not applicable     Plan for discharge: TCU ([facility])    Name and relation of person helping if known: Gael Ignacio. Son.     Person completing Vehicle Transfer Screen: Ivone Bowles

## 2019-08-02 NOTE — PROGRESS NOTES
Transition Communication Hand-off for Care Transitions to Next Level of Care Provider    Name: Dyaa Ignacio  : 10/11/1930  MRN #: [id number]  Primary Care Provider: Marcy Soares  Primary Care MD Name: Dr. Soares  Primary Clinic: [address] [facility] [address]  Primary Care Clinic Name: [place]  Reason for Hospitalization:  Urinary tract infection due to extended-spectrum beta lactamase (ESBL) producing Escherichia coli [N39.0, B96.29, Z16.12]  Sputum culture positive for ESBL E. coli [R84.5]  Admit Date/Time: 2019  7:26 AM  Discharge Date: 19  Payor Source: Payor: MEDICARE / Plan: MEDICARE / Product Type: Medicare /     Readmission Assessment Measure (EMI) Risk Score/category: Very High          Reason for Communication Hand-off Referral: Fragility    Discharge Plan: TCU [facility]       Concern for non-adherence with plan of care:   No  Discharge Needs Assessment:  Needs      Most Recent Value   Equipment Currently Used at Home  walker, rolling   PAS Number  [id number]          Already enrolled in Tele-monitoring program and name of program:  NA  Follow-up specialty is recommended: No    Follow-up plan:    Future Appointments   Date Time Provider Department Center   2019  2:15 PM Marcy Soares, [facility]       Any outstanding tests or procedures:        Referrals     Future Labs/Procedures    Occupational Therapy Adult Consult     Comments:    Evaluate and treat as clinically indicated.    Reason:  Weakness, deconditioning    Physical Therapy Adult Consult     Comments:    Evaluate and treat as clinically indicated.    Reason:  Weakness, deconditioning                Key Recommendations:  FYI of Hospitalization:  CTS identifies patient as high risk due to elevated EMI score. Currently admitted for ESBL E. Coli UTI, this is her 7th admission in 6 months. She has had 2 ED-only visits in 6 months. Per chart review, pt resides at [facility]. Baseline mobility is  independent. She is currently an assist X 1.      She is independent at [facility]. She uses a cane and rolling walker. She does her own medication administration, cooking and ADLs. She has a private pay  who comes monthly. Her son is supportive & involved. He provides transportation. Daya is aware of post hospitalization follow up within 7 days, she states her son will call to schedule the appointment.     Patient discharged to [facility] TCU. Patient will need a CBC lab check for further monitoring.      Citlaly Sidhu and Isabel Rowland    AVS/Discharge Summary is the source of truth; this is a helpful guide for improved communication of patient story

## 2019-08-02 NOTE — PROGRESS NOTES
Discharge Planner   Discharge Plans in progress: [facility], prvt room.  Barriers to discharge plan: None anticipated.   Follow up plan: Bin Mayo to transport at 1300. PAS completed. Son requesting phone call from MD prior to discharge, notified charge RN. Orders to be faxed. SW to continue following.        Entered by: Jany Rebolledo 08/02/2019 9:37 AM     1000: Orders faxed.

## 2019-08-02 NOTE — PLAN OF CARE
"BP (!) 145/55   Pulse 65   Temp 96.5  F (35.8  C)   Resp 18   Ht 1.549 m (5' 1\")   Wt 52.6 kg (116 lb)   SpO2 100%   BMI 21.92 kg/m       VSS.  Lung sounds clear, 02 weaned to off.  Sats % on room air.  Patient is alert, orientated x 4, however forgetful.  Patient denies pain, does however c/o of burning in the vaginal/perineal area.  PureWick placed for increased comfort, as patient up frequently to void.  Patient tolerating evening regular meal tray.  No further stooling.  Maintenance IV of NS with 20 meqs KCL continues at 1000 mls/hour.  PLAN:  Discharge to [facility] pending.  Continue to provide supportive cares.        "

## 2019-08-02 NOTE — DISCHARGE SUMMARY
[facility]  Discharge Summary  Name: Daya Ignacio    MRN: [id number]  YOB: 1930    Age: 88 year old  Date of Discharge:  8/2/2019  Date of Admission: 7/28/2019  Primary Care Provider: Marcy Soares  Discharge Physician:  Amanda Kenyon MD  Discharging Service:  Hospitalist      Discharge Diagnoses:  1.  History of ESBL E. coli UTI, no current evidence of infection  2.  Recurrent UTI versus colonization of ESBL E. coli in the setting of urinary retention  3.  Vaginal pain  4.  History of C. difficile, diarrhea this admission without recurrent C. Difficile  5.  History of coronary artery disease, hypertension, diastolic heart failure, moderate pulmonary hypertension  6.  Depression  7.  Anemia with recent GI bleeding  8.  Thrombocytopenia  9.  Macular degeneration  10.  Diffuse weakness     Follow-ups Needed After Discharge   1.  Follow-up with primary care doctor upon discharge from TCU  2.  Repeat CBC in approximately 10 days to ensure thrombocytopenia and anemia  3.  Follow-up with gynecology if vaginal pain is persistent    Unresulted Labs Ordered in the Past 30 Days of this Admission     No orders found from 10/16/2018 to 12/16/2018.        Hospital Course:  Daya Ignacio is a 88 year old woman with past medical history significant for chronic back pain, 2 recent hospitalizations for ESBL UTI, recent hospitalizations for ARF (due to meds) and n/v, recent GI bleed, and recent C. difficile treatment.  Patient was recently hospitalized 5/26 through 5/31 for ESBL UTI treated with Bactrim and again 6/10 through 6/16 for ESBL UTI (now resistant to Bactrim and treated with meropenem), and again 7/4-7/7 treated with meropenem. Patient admitted 7/28/2019 with symptoms of dysuria, weakness and nausea after a urine culture from 7/22 grew ESBL and was not treated (had been admitted with n/v).  Initially started on antibiotic therapy ID was consulted.  Her urine culture this admission was  negative and antibiotics now discontinued.  She now has intermittent urinary retention.  She underwent cystoscopy which showed only a cystocele otherwise no abnormalities.    Will discharge to TCU today.     1. History of ESBL E coli UTI - No current evidence of infection: Patient was hospitalized here from 7/22-7/23 for nausea which resolved by the time she was admitted.  She had a UA which showed possible UTI but antibiotics were not continued as it was not felt to represent a true infection.  Ultimately urinary culture showed >100K ESBL E coli, she has not been on antibiotics for this.  She developed nausea, weakness and dysuria.    She received ertapenem in the ER and then was admitted on Macrobid.  She did develop a low-grade fever and was subsequently restarted on meropenem.  ID was consulted given history of recurrent infections.  Her urine culture this admission is negative so meropenem was discontinued.  It is unclear why she had fevers and leukocytosis but work-up has otherwise been negative.  She has remained stable off antibiotics (last dose of antibiotics was on the morning of 7/30).     2.  Recurrent UTI versus colonization of ESBL E. coli in the setting of urinary retention: Patient was seen by urology and she underwent cystoscopy which showed no abnormalities other than the cystocele.  She is requiring intermittent catheterizations.  She was started on Flomax and will also continue with this.  We will need to monitor postvoid residual with straight catheterization if postvoid residual is greater than 350 cc.  She can follow-up with urology in the outpatient setting if the urinary retention continues.     3.  Vaginal pain: Patient reports vaginal complain.  Transvaginal ultrasound was done which showed no cause of her vaginal pain (has a left ovarian simple cyst otherwise normal).    Nothing found during cystoscopy, pelvic and rectal examinations done at that time with no rectal mass, vaginal or  pelvic mass and normal feeling cervix.  She can follow-up with GYN in the outpatient setting if this pain persists.     4.  Diarrhea with history of recent C. difficile infection: Patient was treated in early July for C. difficile.  She was treated with oral vancomycin for short time while she was on meropenem (prophylactic dosing).  She developed diarrhea but C. difficile is negative.  Diarrhea may be from the antibiotics she received earlier in the admission.  She can take Imodium as needed for diarrhea.  Diarrhea improved prior to discharge.     5.  Coronary artery disease (history of NSTEMI 5/2017), Hypertension, History of congestive heart failure with preserved ejection fraction, History of moderate pulmonary hypertension    - Continue PTA Imdur, metoprolol, lisinopril, and clonidine.    - Patient does not appear volume overloaded.  She is not on any diuretics.    - Patient is notably no longer on aspirin.     6.  Depression: Continue Zoloft.     7.  Anemia recent GI bleed: Hemoglobin was 11 on day of admission.  Was significantly lower in the setting of a lower GI bleed recently (mid July).  Prior GIB was felt to be diverticular or related to her C. difficile infection.  Her hemoglobin has been drifting down throughout her admission, on day of discharge hemoglobin was 8.1.  Would recommend a CBC in approximately 10 days.     8.  Thrombocytopenia: Platelets were normal on admission and have decreased to the 100 range.  No bleeding.  She did receive 1 dose of enoxaparin on 7/29 but otherwise no anticoagulants.    Platelets are slightly improving, on day of discharge 119.  She should have a CBC in 10 days.    9.  Macular degeneration: Patient follows with a retina specialist at [facility].  She has noticed that her vision has been worsening recently.  Her son is going to call the eye clinic to get her a follow-up appointment.    10.  Diffuse weakness: Patient has been hospitalized numerous times over  "the past several months.  She has become quite deconditioned.    PT recommended TCU.  She will have further PT and OT at rehab.     Discharge Disposition:  Discharged to short-term care facility     Allergies:  Allergies   Allergen Reactions     Atorvastatin Muscle Pain (Myalgia)     Macrobid [Nitrofurantoin Anhydrous] Other (See Comments)     Body aches     Sulfa Drugs      Tolerated Bactrim May 2019        Condition on Discharge:  Discharge condition: Stable   Discharge vitals: Blood pressure (!) 161/60, pulse 64, temperature 96.8  F (36  C), temperature source Oral, resp. rate 16, height 1.549 m (5' 1\"), weight 52.6 kg (116 lb), SpO2 96 %, not currently breastfeeding.   Code status on discharge: Full Code     History of Illness:  See detailed admission note for full details.    Physical Exam:  Blood pressure (!) 161/60, pulse 64, temperature 96.8  F (36  C), temperature source Oral, resp. rate 16, height 1.549 m (5' 1\"), weight 52.6 kg (116 lb), SpO2 96 %, not currently breastfeeding.  Wt Readings from Last 1 Encounters:   07/28/19 52.6 kg (116 lb)     General: Alert, awake, no acute distress.  HEENT: Normocephalic, atraumatic, eyes anicteric and without scleral injection, EOMI, MMM.  Cardiac: RRR, normal S1, S2.  No m/g/r. No LE edema.  Pulmonary: Normal chest rise, normal work of breathing.  Lungs CTAB  Abdomen: soft, non-tender, non-distended.  Normoactive BS.  No guarding or rebound tenderness.  Extremities: no deformities.  Warm, well perfused.  Skin: no rashes or lesions noted.  Warm and Dry.  Neuro: No focal deficits noted.  Speech clear.  Coordination and strength grossly normal.  Psych: Appropriate affect. Alert and oriented x3    Procedures other than Imaging:  Cystoscopy  IV antibiotics     Imaging:  Results for orders placed or performed during the hospital encounter of 07/28/19   US Pelvic Complete with Transvaginal    Narrative    ULTRASOUND PELVIC COMPLETE WITH TRANSVAGINAL July 30, 2019 11:57 AM "     HISTORY: Vaginal pain.    TECHNIQUE: Transvaginal imaging was added to the transabdominal scans.    COMPARISON: 7/8/2019.    FINDINGS: The uterus is atrophic, measuring 4.9 x 4 x 1.7 cm. No  fibroids are evident. Endometrial stripe measures 0.3 cm and is within  normal limits. Few small nabothian cysts are noted within the cervix.  The right ovary is not visualized, possibly related to atrophy or  prominent overlying bowel gas. The right ovary was also not visualized  on the previous exam. The left ovary contains a simple cyst measuring  3.5 cm, not significantly changed. No solid adnexal masses are  identified. No free pelvic fluid is present.       Impression    IMPRESSION:   1. A left ovarian simple cyst measures 3.5 cm.  2. Nonvisualization of the right ovary.  3. No definite cause for vaginal pain is identified.    TITA NATHAN MD        Consultations:  Consultations This Hospital Stay   CARE COORDINATOR IP CONSULT  INFECTIOUS DISEASES IP CONSULT  UROLOGY IP CONSULT  PHYSICAL THERAPY ADULT IP CONSULT  SOCIAL WORK IP CONSULT  PHYSICAL THERAPY ADULT IP CONSULT  OCCUPATIONAL THERAPY ADULT IP CONSULT     Recent Lab Results:  Recent Labs   Lab 08/02/19  0600 08/01/19  0625 07/31/19  0536   WBC 4.0 3.3* 4.7   HGB 8.1* 7.5* 7.5*   HCT 24.1* 23.6* 22.9*   MCV 93 95 93   * 91* 93*     Recent Labs   Lab 08/02/19  0600 08/01/19  0625 07/31/19  1504 07/31/19  0536    138  --  134   POTASSIUM 3.7 4.3 4.0 3.3*   CHLORIDE 106 111*  --  105   CO2 30 23  --  23   ANIONGAP 3 4  --  6   GLC 88 87  --  92   BUN 9 17  --  28   CR 0.56 0.60  --  0.72   GFRESTIMATED 83 81  --  74   GFRESTBLACK >90 >90  --  86   DAVE 8.6 8.0*  --  8.1*          Pending Results:    Unresulted Labs Ordered in the Past 30 Days of this Admission     Date and Time Order Name Status Description    8/2/2019 0000 Blood Morphology Pathologist Review In process     7/28/2019 0824 Blood culture Preliminary     7/28/2019 0754 Blood culture  Preliminary            Discharge Instructions and Follow-Up:   Discharge Orders      General info for SNF    Length of Stay Estimate: Short Term Care: Estimated # of Days <30  Condition at Discharge: Improving  Level of care:skilled   Rehabilitation Potential: Good  Admission H&P remains valid and up-to-date: Yes  Recent Chemotherapy: N/A  Use Nursing Home Standing Orders: Yes     Mantoux instructions    Give two-step Mantoux (PPD) Per Facility Policy Yes     Reason for your hospital stay    You were hospitalized for weakness, fever and nausea.  Initially there was concern that you had a urinary tract infection but your culture was negative and antibiotics were stopped. You will go to a transitional care unit for further rehab to improve your strength prior to going home.     Bladder scan    X 2 for post void residual     Additional Discharge Instructions    Straight catheterization if PVR > 350 cc     Activity - Up with assistive device     Full Code     Physical Therapy Adult Consult    Evaluate and treat as clinically indicated.    Reason:  Weakness, deconditioning     Occupational Therapy Adult Consult    Evaluate and treat as clinically indicated.    Reason:  Weakness, deconditioning     Advance Diet as Tolerated    Follow this diet upon discharge: Orders Placed This Encounter      Regular Diet Adult     Discharge Medications   Current Discharge Medication List      START taking these medications    Details   loperamide (IMODIUM) 2 MG capsule Take 1 capsule (2 mg) by mouth 4 times daily as needed for diarrhea    Associated Diagnoses: Diarrhea, unspecified type      ondansetron (ZOFRAN-ODT) 4 MG ODT tab Take 1 tablet (4 mg) by mouth every 6 hours as needed for nausea or vomiting    Associated Diagnoses: Nausea      tamsulosin (FLOMAX) 0.4 MG capsule Take 1 capsule (0.4 mg) by mouth daily    Associated Diagnoses: Urinary retention         CONTINUE these medications which have NOT CHANGED    Details    acetaminophen (TYLENOL) 325 MG tablet Take 2 tablets (650 mg) by mouth every 4 hours as needed for mild pain    Associated Diagnoses: Closed compression fracture of second lumbar vertebra, sequela      amLODIPine (NORVASC) 10 MG tablet Take 1 tablet (10 mg) by mouth daily  Qty: 90 tablet, Refills: 1    Associated Diagnoses: Non-STEMI (non-ST elevated myocardial infarction) (H)      Calcium Carbonate (CALCIUM 600 PO) Take 1 tablet by mouth 2 times daily       Cholecalciferol (VITAMIN D3) 2000 UNITS CAPS Take 2,000 Units by mouth daily (with dinner)      cloNIDine (CATAPRES) 0.1 MG tablet TAKE ONE TABLET BY MOUTH TWO TIMES A DAY  Qty: 180 tablet, Refills: 0    Associated Diagnoses: Essential hypertension      estradiol (ESTRACE) 0.1 MG/GM vaginal cream Place 2 g vaginally three times a week paraben-free  Qty: 6 g, Refills: 3    Associated Diagnoses: Symptomatic menopausal or female climacteric states      fish oil-omega-3 fatty acids 1000 MG capsule Take 1 g by mouth daily      gabapentin (NEURONTIN) 100 MG capsule Take 2 capsules (200 mg) by mouth 2 times daily  Qty: 120 capsule, Refills: 0    Associated Diagnoses: Other chronic pain      isosorbide dinitrate (ISORDIL) 10 MG tablet Take 1 tablet (10 mg) by mouth 3 times daily  Qty: 270 tablet, Refills: 3    Associated Diagnoses: Non-STEMI (non-ST elevated myocardial infarction) (H)      metoprolol succinate ER (TOPROL-XL) 100 MG 24 hr tablet Take 1 tablet (100 mg) by mouth daily  Qty: 90 tablet, Refills: 1    Associated Diagnoses: Essential hypertension      Multiple Vitamins-Minerals (PRESERVISION AREDS) CAPS Take 1 capsule by mouth 2 times daily       sertraline (ZOLOFT) 100 MG tablet Take 1 tablet (100 mg) by mouth daily  Qty: 90 tablet, Refills: 1    Associated Diagnoses: Generalized anxiety disorder      vitamin C (ASCORBIC ACID) 500 MG tablet Take 500 mg by mouth daily             Time Spent on this Encounter   I, Amanda Kenyon, personally saw the patient  today and spent greater than 30 minutes discharging this patient.    Amanda Kenyon MD

## 2019-08-02 NOTE — DISCHARGE INSTRUCTIONS
1. You had a cystoscopy while hospitalized.  There were no abnormalities other than a cystocele.  You had a urine culture and were seen by infectious disease this admission but did not have a urinary tract infection so antibiotics were stopped.  2. If you have persistent vaginal pain you should see Gynecology in clinic for further evaluation.  3. Follow up with your Retina specialist regarding macular degeneration.

## 2019-08-02 NOTE — PLAN OF CARE
A&Ox4. RA. Up w/ Ax1, gait belt and walker in room. CMS intact. Patient reports burning sensation to perineal/vaginal area. States that the burning sensation does not change while voiding, but is present constantly. Voiding in adequate amt's, not as frequently. Redness to perineal area. Voiding in adequate amt's. BS active, tolerating regular diet, passing flatus. Infectious Disease consulted. PT consulted. Social Work following. Plan is to discharge to [facility] with sonGael, providing transportation at 1:00 PM. SL. Will continue to monitor.

## 2019-08-02 NOTE — PROGRESS NOTES
Clinic Care Coordination Contact  Care Coordination Transition Communication    Referral Source: IP Handoff    Clinical Data: Patient was hospitalized at [facility] from 07/28 to 08/02 with diagnosis of UTI.     Transition to Facility:              Facility Name: [facility]               RNCC sent secure PHI fax via communication management tab. Requested notification of patients discharge and any outstanding care coordination needs.     Plan: RN/SW Care Coordinator will await notification from facility staff informing RN/SW Care Coordinator of patient's discharge plans/needs. RN/SW Care Coordinator will review chart and outreach to facility staff every 4 weeks and as needed.     Elizabeth Simmons RN Care Coordinator  [facility]  Email: [email or web address]  Phone: [phone]

## 2019-08-02 NOTE — PLAN OF CARE
Physical Therapy Discharge Summary    Reason for therapy discharge:    Discharged to transitional care facility.    Progress towards therapy goal(s). See goals on Care Plan in [facility] electronic health record for goal details.  Goals not met.  Barriers to achieving goals:   discharge from facility.    Therapy recommendation(s):    Continued therapy is recommended.  Rationale/Recommendations:  PT as indicated at TCU.     Note: Pt not seen by documenting PT on this date. Information obtained from chart review.

## 2019-08-02 NOTE — PROGRESS NOTES
UROLOGY    Normal cysto with Dr. Carlson yesterday. Follow up with Dr. Jansen if needed.    Bonita Whitaker PA-C  [facility]  [phone]

## 2019-08-04 VITALS
HEART RATE: 54 BPM | DIASTOLIC BLOOD PRESSURE: 54 MMHG | BODY MASS INDEX: 22.5 KG/M2 | OXYGEN SATURATION: 95 % | SYSTOLIC BLOOD PRESSURE: 115 MMHG | WEIGHT: 119.1 LBS | RESPIRATION RATE: 16 BRPM | TEMPERATURE: 98.1 F

## 2019-08-05 ENCOUNTER — NURSING HOME VISIT (OUTPATIENT)
Dept: GERIATRICS | Facility: CLINIC | Age: 84
End: 2019-08-05
Payer: MEDICARE

## 2019-08-05 DIAGNOSIS — I10 ESSENTIAL HYPERTENSION: ICD-10-CM

## 2019-08-05 DIAGNOSIS — D69.6 THROMBOCYTOPENIA (H): ICD-10-CM

## 2019-08-05 DIAGNOSIS — Z86.19 HX OF CLOSTRIDIUM DIFFICILE INFECTION: ICD-10-CM

## 2019-08-05 DIAGNOSIS — Z87.440 PERSONAL HISTORY OF URINARY TRACT INFECTION: ICD-10-CM

## 2019-08-05 DIAGNOSIS — R53.1 GENERALIZED WEAKNESS: Primary | ICD-10-CM

## 2019-08-05 DIAGNOSIS — R33.9 URINARY RETENTION: ICD-10-CM

## 2019-08-05 DIAGNOSIS — I50.30 HEART FAILURE WITH PRESERVED EJECTION FRACTION, UNSPECIFIED HF CHRONICITY (H): ICD-10-CM

## 2019-08-05 DIAGNOSIS — R19.7 DIARRHEA, UNSPECIFIED TYPE: ICD-10-CM

## 2019-08-05 DIAGNOSIS — D62 ANEMIA DUE TO BLOOD LOSS, ACUTE: ICD-10-CM

## 2019-08-05 DIAGNOSIS — I25.10 CORONARY ARTERY DISEASE INVOLVING NATIVE CORONARY ARTERY OF NATIVE HEART WITHOUT ANGINA PECTORIS: ICD-10-CM

## 2019-08-05 PROCEDURE — 99310 SBSQ NF CARE HIGH MDM 45: CPT | Performed by: NURSE PRACTITIONER

## 2019-08-05 NOTE — PROGRESS NOTES
[facility]  PRIMARY CARE PROVIDER AND CLINIC:  Marcy Soares PA-C, [address] [facility] [address]  Chief Complaint   Patient presents with     Hospital F/U     [place] Medical Record Number:  [id number]  Place of Service where encounter took place:  [facility] ([facility]) [[id number]]    Daya Ignacio  is a 88 year old  (10/11/1930), past medical history significant for chronic back pain, 2 recent hospitalizations for ESBL UTI, recent hospitalizations for ARF (due to meds) and n/v, recent GI bleed, and recent C. difficile treatment.  Patient was recently hospitalized 5/26 through 5/31 for ESBL UTI treated with Bactrim and again 6/10 through 6/16 for ESBL UTI (now resistant to Bactrim and treated with meropenem), and again 7/4-7/7 treated with meropenem. Patient admitted 7/28/2019 with symptoms of dysuria, weakness and nausea after a urine culture from 7/22 grew ESBL and was not treated (had been admitted with n/v).  Initially started on antibiotic therapy ID was consulted.  Her urine culture this admission was negative and antibiotics now discontinued.  She now has intermittent urinary retention.  She underwent cystoscopy which showed only a cystocele otherwise no abnormalities.  admitted to the above facility from  [facility]. Hospital stay 7/28/19 through 8/2/19..  Admitted to this facility for  rehab, medical management and nursing care.    HPI:    HPI information obtained from: facility chart records, facility staff, patient report and [facility] chart review.   Brief Summary of Hospital Course:   Hx of ESBL E coli UTI: no current infiection, last dose of Abx was 7/30, urology consulted, cystoscopy shows no abnormalities other than cystocele, she is requiring intermittent catheterizations, started on flomax, continue to monitor for urinary retention.   Vaginal pain: transvaginal US shows left ovarian simple cyst otherwise normal. F/u with GYN as OP if pain  persists.   Diarrhea: Hx of C-diff, current C-diff culture negative, diarrhea improved with imodium and DC Abx.   CAD with hx of NSTEMI 2017, HTN, CHF and moderate pulmonary HTN. euvolemic during hospitalization.   Anemia: Hx of GI bleed/diverticular in setting of C-diff. Hgb at discharge 8.1, follow  Thrombocytopenia: no bleeding, platelets WNL on admission and trended down to 100,000 range, did recieve 1 dose of lovenox 7/29 otherwise no AC, follow  Updates on Status Since Skilled nursing Admission: On exam today patient is resting in bed, alert, pleasant, states she has ongoing chronic low back pain, denies any other pain or discomfort, denies fever, chills, cough, congestion, N/V/D or constipation, states she did not sleep well last night, no other concerns at this time, states she is urinating without pain or frequency.     CODE STATUS/ADVANCE DIRECTIVES DISCUSSION:   CPR/Full code   Patient's living condition: INDEPENDENT LIVING FACILITY  ALLERGIES: Atorvastatin; Macrobid [nitrofurantoin anhydrous]; and Sulfa drugs  PAST MEDICAL HISTORY:  has a past medical history of 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 (4/16/2014), Closed fracture of unspecified part of neck of femur, Closed fracture of unspecified part of vertebral column without mention of spinal cord injury, Clostridium difficile colitis (06/2019), Hypertension, Lumbar compression fracture (H), Macular degeneration (senile) of retina, unspecified, Upper GI bleed (06/2019), and Urinary tract infection due to extended-spectrum beta lactamase (ESBL)-producing Klebsiella (05/2019).  PAST SURGICAL HISTORY:   has a past surgical history that includes NONSPECIFIC PROCEDURE; orthopedic surgery; Esophagoscopy, gastroscopy, duodenoscopy (EGD), combined (N/A, 6/11/2019); and Cystoscopy (N/A, 8/1/2019).  FAMILY HISTORY: family history includes Alzheimer Disease in her mother; Cardiovascular in her father; Neurologic Disorder (age of onset: 83) in her  brother.  SOCIAL HISTORY:   reports that she has never smoked. She has never used smokeless tobacco. She reports that she does not drink alcohol or use drugs.    Post Discharge Medication Reconciliation Status: discharge medications reconciled, continue medications without change    Current Outpatient Medications   Medication Sig Dispense Refill     acetaminophen (TYLENOL) 325 MG tablet Take 2 tablets (650 mg) by mouth every 4 hours as needed for mild pain       amLODIPine (NORVASC) 10 MG tablet Take 1 tablet (10 mg) by mouth daily 90 tablet 1     Calcium Carbonate (CALCIUM 600 PO) Take 1 tablet by mouth 2 times daily        Cholecalciferol (VITAMIN D3) 2000 UNITS CAPS Take 2,000 Units by mouth daily (with dinner)       cloNIDine (CATAPRES) 0.1 MG tablet TAKE ONE TABLET BY MOUTH TWO TIMES A  tablet 0     estradiol (ESTRACE) 0.1 MG/GM vaginal cream Place 2 g vaginally three times a week paraben-free 6 g 3     fish oil-omega-3 fatty acids 1000 MG capsule Take 1 g by mouth daily       gabapentin (NEURONTIN) 100 MG capsule Take 2 capsules (200 mg) by mouth 2 times daily 120 capsule 0     isosorbide dinitrate (ISORDIL) 10 MG tablet Take 1 tablet (10 mg) by mouth 3 times daily 270 tablet 3     loperamide (IMODIUM) 2 MG capsule Take 1 capsule (2 mg) by mouth 4 times daily as needed for diarrhea       metoprolol succinate ER (TOPROL-XL) 100 MG 24 hr tablet Take 1 tablet (100 mg) by mouth daily 90 tablet 1     Multiple Vitamins-Minerals (PRESERVISION AREDS) CAPS Take 1 capsule by mouth 2 times daily        ondansetron (ZOFRAN-ODT) 4 MG ODT tab Take 1 tablet (4 mg) by mouth every 6 hours as needed for nausea or vomiting       sertraline (ZOLOFT) 100 MG tablet Take 1 tablet (100 mg) by mouth daily 90 tablet 1     tamsulosin (FLOMAX) 0.4 MG capsule Take 1 capsule (0.4 mg) by mouth daily       vitamin C (ASCORBIC ACID) 500 MG tablet Take 500 mg by mouth daily         ROS:  10 point ROS of systems including Constitutional,  Eyes, Respiratory, Cardiovascular, Gastroenterology, Genitourinary, Integumentary, Musculoskeletal, Psychiatric were all negative except for pertinent positives noted in my HPI.    Vitals:  /54   Pulse 54   Temp 98.1  F (36.7  C)   Resp 16   Wt 54 kg (119 lb 1.6 oz)   SpO2 95%   BMI 22.50 kg/m    Exam:  GENERAL APPEARANCE:  Alert, in no distress, thin  ENT:  Mouth and posterior oropharynx normal, moist mucous membranes, [place]  EYES:  EOM, conjunctivae, lids, pupils and irises normal, PERRL  RESP:  respiratory effort and palpation of chest normal, lungs clear to auscultation , no respiratory distress  CV:  Palpation and auscultation of heart done , regular rate and rhythm, no murmur, rub, or gallop, no edema  ABDOMEN:  normal bowel sounds, soft, nontender, no hepatosplenomegaly or other masses  M/S:   Examination of:   right upper extremity, left upper extremity, right lower extremity and left lower extremity  Inspection, ROM, stability and muscle strength normal and generalized weakness noted  SKIN:  Inspection of skin and subcutaneous tissue baseline  NEURO:   Cranial nerves 2-12 are normal tested and grossly at patient's baseline, speech WNL  PSYCH:  affect and mood normal    Lab/Diagnostic data:  Recent labs in [facility] reviewed by me today.     ASSESSMENT/PLAN:  Generalized weakness  Urinary retention  Personal history of urinary tract infection  Acute/ongoing: no recent Abx for UTI, urology consult, cystocopy WNL, continue flomax 0.4mg QD, uses estrace vaginal cream M-W-F  PVR q shift, straight cath for PVR > 350  Urology appt as directed    Anemia due to blood loss, acute  Thrombocytopenia (H)  Acute/ongoing: CBC on Thursday, follow  Hgb 8.1 at discharge and Plt 100K    Essential hypertension  Coronary artery disease involving native coronary artery of native heart without angina pectoris  Heart failure with preserved ejection fraction, unspecified HF chronicity (H)  Ongoing: daily weights, vitals daily  and prn, BMP follow, continue toprol xl 100mg QD, clonidine 0.1mg BID, norvasc 10mg QD, isordil 10mg TID     Diarrhea, unspecified type  Hx of Clostridium difficile infection  Acute/ongoing: continue imodium 2mg QID prn       Orders written by provider at facility  CBC and BMP on Thursday  Daily weights  PVR q shift, straight cath for PVR > 350cc    Total time spent with patient visit at the skilled nursing facility was 45 min including patient visit and review of past records. Greater than 50% of total time spent with counseling and coordinating care due to coordinating care with nurse on admission orders, coordinating care with follow up labs and appointments and counseling patient/resident for 10 minutes on: the reason for their hospitalization and what the treatment is, the plan of SNF stay and projected length of stay, current medications (treatments) reconciled from the hospital and recent past lab and imaging results and subsequent treatment plan.    Electronically signed by:  Tonya Lynn Haase, APRN CNP

## 2019-08-05 NOTE — LETTER
8/5/2019        RE: Daya Ignacio  [address]  [facility] [id number]        [place] [facility]  PRIMARY CARE PROVIDER AND CLINIC:  Marcy Soares PA-C, [address] [facility] [address]  Chief Complaint   Patient presents with     Hospital F/U     [place] Medical Record Number:  [id number]  Place of Service where encounter took place:  [facility] ([facility]) [[id number]]    Daya Ignacio  is a 88 year old  (10/11/1930), past medical history significant for chronic back pain, 2 recent hospitalizations for ESBL UTI, recent hospitalizations for ARF (due to meds) and n/v, recent GI bleed, and recent C. difficile treatment.  Patient was recently hospitalized 5/26 through 5/31 for ESBL UTI treated with Bactrim and again 6/10 through 6/16 for ESBL UTI (now resistant to Bactrim and treated with meropenem), and again 7/4-7/7 treated with meropenem. Patient admitted 7/28/2019 with symptoms of dysuria, weakness and nausea after a urine culture from 7/22 grew ESBL and was not treated (had been admitted with n/v).  Initially started on antibiotic therapy ID was consulted.  Her urine culture this admission was negative and antibiotics now discontinued.  She now has intermittent urinary retention.  She underwent cystoscopy which showed only a cystocele otherwise no abnormalities.  admitted to the above facility from  [facility]. Hospital stay 7/28/19 through 8/2/19..  Admitted to this facility for  rehab, medical management and nursing care.    HPI:    HPI information obtained from: facility chart records, facility staff, patient report and [facility] chart review.   Brief Summary of Hospital Course:   Hx of ESBL E coli UTI: no current infiection, last dose of Abx was 7/30, urology consulted, cystoscopy shows no abnormalities other than cystocele, she is requiring intermittent catheterizations, started on flomax, continue to monitor for urinary retention.   Vaginal  pain: transvaginal US shows left ovarian simple cyst otherwise normal. F/u with GYN as OP if pain persists.   Diarrhea: Hx of C-diff, current C-diff culture negative, diarrhea improved with imodium and DC Abx.   CAD with hx of NSTEMI 2017, HTN, CHF and moderate pulmonary HTN. euvolemic during hospitalization.   Anemia: Hx of GI bleed/diverticular in setting of C-diff. Hgb at discharge 8.1, follow  Thrombocytopenia: no bleeding, platelets WNL on admission and trended down to 100,000 range, did recieve 1 dose of lovenox 7/29 otherwise no AC, follow  Updates on Status Since Skilled nursing Admission: On exam today patient is resting in bed, alert, pleasant, states she has ongoing chronic low back pain, denies any other pain or discomfort, denies fever, chills, cough, congestion, N/V/D or constipation, states she did not sleep well last night, no other concerns at this time, states she is urinating without pain or frequency.     CODE STATUS/ADVANCE DIRECTIVES DISCUSSION:   CPR/Full code   Patient's living condition: INDEPENDENT LIVING FACILITY  ALLERGIES: Atorvastatin; Macrobid [nitrofurantoin anhydrous]; and Sulfa drugs  PAST MEDICAL HISTORY:  has a past medical history of 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 (4/16/2014), Closed fracture of unspecified part of neck of femur, Closed fracture of unspecified part of vertebral column without mention of spinal cord injury, Clostridium difficile colitis (06/2019), Hypertension, Lumbar compression fracture (H), Macular degeneration (senile) of retina, unspecified, Upper GI bleed (06/2019), and Urinary tract infection due to extended-spectrum beta lactamase (ESBL)-producing Klebsiella (05/2019).  PAST SURGICAL HISTORY:   has a past surgical history that includes NONSPECIFIC PROCEDURE; orthopedic surgery; Esophagoscopy, gastroscopy, duodenoscopy (EGD), combined (N/A, 6/11/2019); and Cystoscopy (N/A, 8/1/2019).  FAMILY HISTORY: family history includes Alzheimer Disease in  her mother; Cardiovascular in her father; Neurologic Disorder (age of onset: 83) in her brother.  SOCIAL HISTORY:   reports that she has never smoked. She has never used smokeless tobacco. She reports that she does not drink alcohol or use drugs.    Post Discharge Medication Reconciliation Status: discharge medications reconciled, continue medications without change    Current Outpatient Medications   Medication Sig Dispense Refill     acetaminophen (TYLENOL) 325 MG tablet Take 2 tablets (650 mg) by mouth every 4 hours as needed for mild pain       amLODIPine (NORVASC) 10 MG tablet Take 1 tablet (10 mg) by mouth daily 90 tablet 1     Calcium Carbonate (CALCIUM 600 PO) Take 1 tablet by mouth 2 times daily        Cholecalciferol (VITAMIN D3) 2000 UNITS CAPS Take 2,000 Units by mouth daily (with dinner)       cloNIDine (CATAPRES) 0.1 MG tablet TAKE ONE TABLET BY MOUTH TWO TIMES A  tablet 0     estradiol (ESTRACE) 0.1 MG/GM vaginal cream Place 2 g vaginally three times a week paraben-free 6 g 3     fish oil-omega-3 fatty acids 1000 MG capsule Take 1 g by mouth daily       gabapentin (NEURONTIN) 100 MG capsule Take 2 capsules (200 mg) by mouth 2 times daily 120 capsule 0     isosorbide dinitrate (ISORDIL) 10 MG tablet Take 1 tablet (10 mg) by mouth 3 times daily 270 tablet 3     loperamide (IMODIUM) 2 MG capsule Take 1 capsule (2 mg) by mouth 4 times daily as needed for diarrhea       metoprolol succinate ER (TOPROL-XL) 100 MG 24 hr tablet Take 1 tablet (100 mg) by mouth daily 90 tablet 1     Multiple Vitamins-Minerals (PRESERVISION AREDS) CAPS Take 1 capsule by mouth 2 times daily        ondansetron (ZOFRAN-ODT) 4 MG ODT tab Take 1 tablet (4 mg) by mouth every 6 hours as needed for nausea or vomiting       sertraline (ZOLOFT) 100 MG tablet Take 1 tablet (100 mg) by mouth daily 90 tablet 1     tamsulosin (FLOMAX) 0.4 MG capsule Take 1 capsule (0.4 mg) by mouth daily       vitamin C (ASCORBIC ACID) 500 MG tablet  Take 500 mg by mouth daily         ROS:  10 point ROS of systems including Constitutional, Eyes, Respiratory, Cardiovascular, Gastroenterology, Genitourinary, Integumentary, Musculoskeletal, Psychiatric were all negative except for pertinent positives noted in my HPI.    Vitals:  /54   Pulse 54   Temp 98.1  F (36.7  C)   Resp 16   Wt 54 kg (119 lb 1.6 oz)   SpO2 95%   BMI 22.50 kg/m     Exam:  GENERAL APPEARANCE:  Alert, in no distress, thin  ENT:  Mouth and posterior oropharynx normal, moist mucous membranes, [place]  EYES:  EOM, conjunctivae, lids, pupils and irises normal, PERRL  RESP:  respiratory effort and palpation of chest normal, lungs clear to auscultation , no respiratory distress  CV:  Palpation and auscultation of heart done , regular rate and rhythm, no murmur, rub, or gallop, no edema  ABDOMEN:  normal bowel sounds, soft, nontender, no hepatosplenomegaly or other masses  M/S:   Examination of:   right upper extremity, left upper extremity, right lower extremity and left lower extremity  Inspection, ROM, stability and muscle strength normal and generalized weakness noted  SKIN:  Inspection of skin and subcutaneous tissue baseline  NEURO:   Cranial nerves 2-12 are normal tested and grossly at patient's baseline, speech WNL  PSYCH:  affect and mood normal    Lab/Diagnostic data:  Recent labs in [facility] reviewed by me today.     ASSESSMENT/PLAN:  Generalized weakness  Urinary retention  Personal history of urinary tract infection  Acute/ongoing: no recent Abx for UTI, urology consult, cystocopy WNL, continue flomax 0.4mg QD, uses estrace vaginal cream M-W-F  PVR q shift, straight cath for PVR > 350  Urology appt as directed    Anemia due to blood loss, acute  Thrombocytopenia (H)  Acute/ongoing: CBC on Thursday, follow  Hgb 8.1 at discharge and Plt 100K    Essential hypertension  Coronary artery disease involving native coronary artery of native heart without angina pectoris  Heart failure with  preserved ejection fraction, unspecified HF chronicity (H)  Ongoing: daily weights, vitals daily and prn, BMP follow, continue toprol xl 100mg QD, clonidine 0.1mg BID, norvasc 10mg QD, isordil 10mg TID     Diarrhea, unspecified type  Hx of Clostridium difficile infection  Acute/ongoing: continue imodium 2mg QID prn       Orders written by provider at facility  CBC and BMP on Thursday  Daily weights  PVR q shift, straight cath for PVR > 350cc    Total time spent with patient visit at the skilled nursing facility was 45 min including patient visit and review of past records. Greater than 50% of total time spent with counseling and coordinating care due to coordinating care with nurse on admission orders, coordinating care with follow up labs and appointments and counseling patient/resident for 10 minutes on: the reason for their hospitalization and what the treatment is, the plan of SNF stay and projected length of stay, current medications (treatments) reconciled from the hospital and recent past lab and imaging results and subsequent treatment plan.    Electronically signed by:  Tonya Lynn Haase, APRN CNP                         Sincerely,        Tonya Lynn Haase, APRN CNP

## 2019-08-09 ENCOUNTER — NURSING HOME VISIT (OUTPATIENT)
Dept: GERIATRICS | Facility: CLINIC | Age: 84
End: 2019-08-09
Payer: MEDICARE

## 2019-08-09 VITALS
HEART RATE: 65 BPM | TEMPERATURE: 97.7 F | RESPIRATION RATE: 18 BRPM | DIASTOLIC BLOOD PRESSURE: 69 MMHG | OXYGEN SATURATION: 99 % | BODY MASS INDEX: 22.5 KG/M2 | SYSTOLIC BLOOD PRESSURE: 162 MMHG | WEIGHT: 119.1 LBS

## 2019-08-09 DIAGNOSIS — M54.40 BILATERAL LOW BACK PAIN WITH SCIATICA, SCIATICA LATERALITY UNSPECIFIED, UNSPECIFIED CHRONICITY: ICD-10-CM

## 2019-08-09 DIAGNOSIS — Z87.440 PERSONAL HISTORY OF URINARY TRACT INFECTION: ICD-10-CM

## 2019-08-09 DIAGNOSIS — R53.1 GENERALIZED WEAKNESS: Primary | ICD-10-CM

## 2019-08-09 DIAGNOSIS — D69.6 THROMBOCYTOPENIA (H): ICD-10-CM

## 2019-08-09 DIAGNOSIS — I50.30 HEART FAILURE WITH PRESERVED EJECTION FRACTION, UNSPECIFIED HF CHRONICITY (H): ICD-10-CM

## 2019-08-09 DIAGNOSIS — D62 ANEMIA DUE TO BLOOD LOSS, ACUTE: ICD-10-CM

## 2019-08-09 DIAGNOSIS — R33.9 URINARY RETENTION: ICD-10-CM

## 2019-08-09 DIAGNOSIS — I25.10 CORONARY ARTERY DISEASE INVOLVING NATIVE CORONARY ARTERY OF NATIVE HEART WITHOUT ANGINA PECTORIS: ICD-10-CM

## 2019-08-09 DIAGNOSIS — I10 ESSENTIAL HYPERTENSION: ICD-10-CM

## 2019-08-09 PROCEDURE — 99310 SBSQ NF CARE HIGH MDM 45: CPT | Performed by: NURSE PRACTITIONER

## 2019-08-09 NOTE — LETTER
"    8/9/2019        RE: Daya Ignacio  [address]  [facility] [address]        [facility]  [facility] Record Number:  [id number]  Place of Service where encounter took place:  [facility] [[id number]]  Chief Complaint   Patient presents with     RECHECK       HPI:    Daya Ignacio  is a 88 year old (10/11/1930), who is being seen today for an episodic care visit.  HPI information obtained from: facility chart records, facility staff, patient report and [facility] chart review. Today's concern is:  Low  Back pain: patient states she has low back pain with radiation down back of legs bilaterally, states \"I can't stand the pain\" patient does take oxycodone at home, she is open to trying lidocaine patch. IN therapy patient is walking with therapy using a RW   Anemia/thrombocytopenia:  Current Hgb 8.1, plt 119   UTI/urinary retention: no concerns with retention  CAD/HTN/CHF: BP as follows: 162/69, 149/70, 109/47 with HR 60's, weight is 119/1lbs, patient denies SOB, cough, congestion.   Past Medical and Surgical History reviewed in Epic today.    MEDICATIONS:  Current Outpatient Medications   Medication Sig Dispense Refill     acetaminophen (TYLENOL) 325 MG tablet Take 2 tablets (650 mg) by mouth every 4 hours as needed for mild pain       amLODIPine (NORVASC) 10 MG tablet Take 1 tablet (10 mg) by mouth daily 90 tablet 1     Calcium Carbonate (CALCIUM 600 PO) Take 1 tablet by mouth 2 times daily        Cholecalciferol (VITAMIN D3) 2000 UNITS CAPS Take 2,000 Units by mouth daily (with dinner)       cloNIDine (CATAPRES) 0.1 MG tablet TAKE ONE TABLET BY MOUTH TWO TIMES A  tablet 0     estradiol (ESTRACE) 0.1 MG/GM vaginal cream Place 2 g vaginally three times a week paraben-free 6 g 3     fish oil-omega-3 fatty acids 1000 MG capsule Take 1 g by mouth daily       gabapentin (NEURONTIN) 100 MG capsule Take 2 capsules (200 mg) by mouth 2 times daily 120 " capsule 0     isosorbide dinitrate (ISORDIL) 10 MG tablet Take 1 tablet (10 mg) by mouth 3 times daily 270 tablet 3     loperamide (IMODIUM) 2 MG capsule Take 1 capsule (2 mg) by mouth 4 times daily as needed for diarrhea       metoprolol succinate ER (TOPROL-XL) 100 MG 24 hr tablet Take 1 tablet (100 mg) by mouth daily 90 tablet 1     Multiple Vitamins-Minerals (PRESERVISION AREDS) CAPS Take 1 capsule by mouth 2 times daily        ondansetron (ZOFRAN-ODT) 4 MG ODT tab Take 1 tablet (4 mg) by mouth every 6 hours as needed for nausea or vomiting       sertraline (ZOLOFT) 100 MG tablet Take 1 tablet (100 mg) by mouth daily 90 tablet 1     tamsulosin (FLOMAX) 0.4 MG capsule Take 1 capsule (0.4 mg) by mouth daily       vitamin C (ASCORBIC ACID) 500 MG tablet Take 500 mg by mouth daily           REVIEW OF SYSTEMS:  10 point ROS of systems including Constitutional, Eyes, Respiratory, Cardiovascular, Gastroenterology, Genitourinary, Integumentary, Musculoskeletal, Psychiatric were all negative except for pertinent positives noted in my HPI.    Objective:  BP (!) 162/69   Pulse 65   Temp 97.7  F (36.5  C)   Resp 18   Wt 54 kg (119 lb 1.6 oz)   SpO2 99%   BMI 22.50 kg/m     Exam:  Exam:  GENERAL APPEARANCE:  Alert, in no distress, thin  ENT:  Mouth and posterior oropharynx normal, moist mucous membranes, [place]  EYES:  EOM, conjunctivae, lids, pupils and irises normal, PERRL  RESP:  respiratory effort and palpation of chest normal, lungs clear to auscultation , no respiratory distress  CV:  Palpation and auscultation of heart done , regular rate and rhythm, no murmur, rub, or gallop, no edema  ABDOMEN:  normal bowel sounds, soft, nontender, no hepatosplenomegaly or other masses  M/S:   Examination of:   right upper extremity, left upper extremity, right lower extremity and left lower extremity  Inspection, ROM, stability and muscle strength normal and generalized weakness noted  SKIN:  Inspection of skin and subcutaneous  tissue baseline  NEURO:   Cranial nerves 2-12 are normal tested and grossly at patient's baseline, speech WNL  PSYCH:  affect and mood normal    Labs:   Recent labs in [facility] reviewed by me today.       ASSESSMENT/PLAN:  Low back pain/  Ongoing: tylenol 1000mg q 6 hours while awake, increase neurontin to 200mg TID, start lidocaine patch 4% to low back, on AM and Off Hs. Continue PT and OT for strengthening    Generalized weakness  Urinary retention  Personal history of urinary tract infection  Acute/ongoing: no recent Abx for UTI, urology consult, cystocopy WNL, continue flomax 0.4mg QD, uses estrace vaginal cream M-W-F  No further PVR  Urology appt as directed     Anemia due to blood loss, acute  Thrombocytopenia (H)  Acute/ongoing: CBC follow,   Hgb 8.1 at discharge and Plt 100K, stable in TCU     Essential hypertension  Coronary artery disease involving native coronary artery of native heart without angina pectoris  Heart failure with preserved ejection fraction, unspecified HF chronicity (H)  Ongoing: daily weights, vitals daily and prn, BMP follow, continue toprol xl 100mg QD, clonidine 0.1mg BID, norvasc 10mg QD, isordil 10mg TID      Diarrhea, unspecified type  Hx of Clostridium difficile infection  Acute/ongoing: continue imodium 2mg QID prn    Orders written by provider at facility  Tylenol 1000mg q 6 hours while awake  Increase neurontin to 200mg TID  Lidocaine patch 4% to low back on AM and off hs    Total time spent with patient visit at the skilled nursing facility was 45 min including patient visit and review of past records. Greater than 50% of total time spent with counseling and coordinating care due to coordinating care with nurse on admission orders, coordinating care with follow up labs and appointments and counseling patient/resident for 15 minutes on: current medications (treatments) reconciled from the hospital, recent past lab and imaging results and subsequent treatment plan and current pain  control start lidocaine patch, increase tylenol dosing.   Electronically signed by:  Tonya Lynn Haase, APRN CNP               Sincerely,        Tonya Lynn Haase, APRN CNP

## 2019-08-09 NOTE — PROGRESS NOTES
"[facility]  [facility] Record Number:  [id number]  Place of Service where encounter took place:  [facility] ([facility] [[id number]]  Chief Complaint   Patient presents with     RECHECK       HPI:    Daya Ignacio  is a 88 year old (10/11/1930), who is being seen today for an episodic care visit.  HPI information obtained from: facility chart records, facility staff, patient report and [facility] chart review. Today's concern is:  Low  Back pain: patient states she has low back pain with radiation down back of legs bilaterally, states \"I can't stand the pain\" patient does take oxycodone at home, she is open to trying lidocaine patch. IN therapy patient is walking with therapy using a RW   Anemia/thrombocytopenia:  Current Hgb 8.1, plt 119   UTI/urinary retention: no concerns with retention  CAD/HTN/CHF: BP as follows: 162/69, 149/70, 109/47 with HR 60's, weight is 119/1lbs, patient denies SOB, cough, congestion.   Past Medical and Surgical History reviewed in Epic today.    MEDICATIONS:  Current Outpatient Medications   Medication Sig Dispense Refill     acetaminophen (TYLENOL) 325 MG tablet Take 2 tablets (650 mg) by mouth every 4 hours as needed for mild pain       amLODIPine (NORVASC) 10 MG tablet Take 1 tablet (10 mg) by mouth daily 90 tablet 1     Calcium Carbonate (CALCIUM 600 PO) Take 1 tablet by mouth 2 times daily        Cholecalciferol (VITAMIN D3) 2000 UNITS CAPS Take 2,000 Units by mouth daily (with dinner)       cloNIDine (CATAPRES) 0.1 MG tablet TAKE ONE TABLET BY MOUTH TWO TIMES A  tablet 0     estradiol (ESTRACE) 0.1 MG/GM vaginal cream Place 2 g vaginally three times a week paraben-free 6 g 3     fish oil-omega-3 fatty acids 1000 MG capsule Take 1 g by mouth daily       gabapentin (NEURONTIN) 100 MG capsule Take 2 capsules (200 mg) by mouth 2 times daily 120 capsule 0     isosorbide dinitrate (ISORDIL) 10 MG tablet Take 1 tablet (10 mg) by mouth 3 times daily 270 " tablet 3     loperamide (IMODIUM) 2 MG capsule Take 1 capsule (2 mg) by mouth 4 times daily as needed for diarrhea       metoprolol succinate ER (TOPROL-XL) 100 MG 24 hr tablet Take 1 tablet (100 mg) by mouth daily 90 tablet 1     Multiple Vitamins-Minerals (PRESERVISION AREDS) CAPS Take 1 capsule by mouth 2 times daily        ondansetron (ZOFRAN-ODT) 4 MG ODT tab Take 1 tablet (4 mg) by mouth every 6 hours as needed for nausea or vomiting       sertraline (ZOLOFT) 100 MG tablet Take 1 tablet (100 mg) by mouth daily 90 tablet 1     tamsulosin (FLOMAX) 0.4 MG capsule Take 1 capsule (0.4 mg) by mouth daily       vitamin C (ASCORBIC ACID) 500 MG tablet Take 500 mg by mouth daily           REVIEW OF SYSTEMS:  10 point ROS of systems including Constitutional, Eyes, Respiratory, Cardiovascular, Gastroenterology, Genitourinary, Integumentary, Musculoskeletal, Psychiatric were all negative except for pertinent positives noted in my HPI.    Objective:  BP (!) 162/69   Pulse 65   Temp 97.7  F (36.5  C)   Resp 18   Wt 54 kg (119 lb 1.6 oz)   SpO2 99%   BMI 22.50 kg/m    Exam:  Exam:  GENERAL APPEARANCE:  Alert, in no distress, thin  ENT:  Mouth and posterior oropharynx normal, moist mucous membranes, [place]  EYES:  EOM, conjunctivae, lids, pupils and irises normal, PERRL  RESP:  respiratory effort and palpation of chest normal, lungs clear to auscultation , no respiratory distress  CV:  Palpation and auscultation of heart done , regular rate and rhythm, no murmur, rub, or gallop, no edema  ABDOMEN:  normal bowel sounds, soft, nontender, no hepatosplenomegaly or other masses  M/S:   Examination of:   right upper extremity, left upper extremity, right lower extremity and left lower extremity  Inspection, ROM, stability and muscle strength normal and generalized weakness noted  SKIN:  Inspection of skin and subcutaneous tissue baseline  NEURO:   Cranial nerves 2-12 are normal tested and grossly at patient's baseline, speech  WNL  PSYCH:  affect and mood normal    Labs:   Recent labs in [facility] reviewed by me today.       ASSESSMENT/PLAN:  Low back pain/  Ongoing: tylenol 1000mg q 6 hours while awake, increase neurontin to 200mg TID, start lidocaine patch 4% to low back, on AM and Off Hs. Continue PT and OT for strengthening    Generalized weakness  Urinary retention  Personal history of urinary tract infection  Acute/ongoing: no recent Abx for UTI, urology consult, cystocopy WNL, continue flomax 0.4mg QD, uses estrace vaginal cream M-W-F  No further PVR  Urology appt as directed     Anemia due to blood loss, acute  Thrombocytopenia (H)  Acute/ongoing: CBC follow,   Hgb 8.1 at discharge and Plt 100K, stable in TCU     Essential hypertension  Coronary artery disease involving native coronary artery of native heart without angina pectoris  Heart failure with preserved ejection fraction, unspecified HF chronicity (H)  Ongoing: daily weights, vitals daily and prn, BMP follow, continue toprol xl 100mg QD, clonidine 0.1mg BID, norvasc 10mg QD, isordil 10mg TID      Diarrhea, unspecified type  Hx of Clostridium difficile infection  Acute/ongoing: continue imodium 2mg QID prn    Orders written by provider at facility  Tylenol 1000mg q 6 hours while awake  Increase neurontin to 200mg TID  Lidocaine patch 4% to low back on AM and off hs    Total time spent with patient visit at the skilled nursing facility was 45 min including patient visit and review of past records. Greater than 50% of total time spent with counseling and coordinating care due to coordinating care with nurse on admission orders, coordinating care with follow up labs and appointments and counseling patient/resident for 15 minutes on: current medications (treatments) reconciled from the hospital, recent past lab and imaging results and subsequent treatment plan and current pain control start lidocaine patch, increase tylenol dosing.   Electronically signed by:  Tonya Lynn Haase, APRN  CNP

## 2019-08-13 ENCOUNTER — TELEPHONE (OUTPATIENT)
Dept: FAMILY MEDICINE | Facility: CLINIC | Age: 84
End: 2019-08-13

## 2019-08-13 VITALS
OXYGEN SATURATION: 98 % | TEMPERATURE: 97 F | WEIGHT: 119.1 LBS | RESPIRATION RATE: 16 BRPM | DIASTOLIC BLOOD PRESSURE: 54 MMHG | HEART RATE: 64 BPM | SYSTOLIC BLOOD PRESSURE: 128 MMHG | BODY MASS INDEX: 22.5 KG/M2

## 2019-08-13 RX ORDER — LIDOCAINE 4 G/G
PATCH TOPICAL
COMMUNITY
End: 2020-05-06

## 2019-08-13 RX ORDER — ACETAMINOPHEN 500 MG
1000 TABLET ORAL EVERY 6 HOURS
COMMUNITY
End: 2019-08-21

## 2019-08-13 NOTE — TELEPHONE ENCOUNTER
[facility], she needs a f/u appointment 7-10 days. 1st appointment is with any provider at  isn't until 9/03. Can patient be fit in or else do they need to go some where else? Please call [facility] at [phone] [facility].  Tamiko Malik

## 2019-08-14 ENCOUNTER — NURSING HOME VISIT (OUTPATIENT)
Dept: GERIATRICS | Facility: CLINIC | Age: 84
End: 2019-08-14
Payer: MEDICARE

## 2019-08-14 DIAGNOSIS — I25.10 CORONARY ARTERY DISEASE INVOLVING NATIVE CORONARY ARTERY OF NATIVE HEART WITHOUT ANGINA PECTORIS: ICD-10-CM

## 2019-08-14 DIAGNOSIS — M54.40 BILATERAL LOW BACK PAIN WITH SCIATICA, SCIATICA LATERALITY UNSPECIFIED, UNSPECIFIED CHRONICITY: ICD-10-CM

## 2019-08-14 DIAGNOSIS — R33.9 URINARY RETENTION: ICD-10-CM

## 2019-08-14 DIAGNOSIS — D69.6 THROMBOCYTOPENIA (H): ICD-10-CM

## 2019-08-14 DIAGNOSIS — D62 ANEMIA DUE TO BLOOD LOSS, ACUTE: ICD-10-CM

## 2019-08-14 DIAGNOSIS — I50.30 HEART FAILURE WITH PRESERVED EJECTION FRACTION, UNSPECIFIED HF CHRONICITY (H): ICD-10-CM

## 2019-08-14 DIAGNOSIS — R53.1 GENERALIZED WEAKNESS: Primary | ICD-10-CM

## 2019-08-14 DIAGNOSIS — Z87.440 PERSONAL HISTORY OF URINARY TRACT INFECTION: ICD-10-CM

## 2019-08-14 DIAGNOSIS — I10 ESSENTIAL HYPERTENSION: ICD-10-CM

## 2019-08-14 PROCEDURE — 99309 SBSQ NF CARE MODERATE MDM 30: CPT | Performed by: NURSE PRACTITIONER

## 2019-08-14 NOTE — LETTER
8/14/2019        RE: Daya Ignacio  [address]  [facility] [address]        [facility]  [facility] Record Number:  [id number]  Place of Service where encounter took place:  [facility] ([facility] [[id number]]  Chief Complaint   Patient presents with     Hospital F/U       HPI:    Daya Ignacio  is a 88 year old (10/11/1930), who is being seen today for an episodic care visit.  HPI information obtained from: facility chart records, facility staff, patient report and [facility] chart review. Today's concern is:  Low  Back pain: patient states back pain has improved, she is using a lidocaine patch with is effective. In therapy patient is walking up to 150 feet using RW indep.   Anemia/thrombocytopenia: stable, see labs   UTI/urinary retention: no concerns with retention  CAD/HTN/CHF: BP as follows: 128/54, 130/55, 142/55 with HR  with HR 60's, weight is 119.1lbs, patient denies SOB, cough, congestion.   Past Medical and Surgical History reviewed in Epic today.    MEDICATIONS:  Current Outpatient Medications   Medication Sig Dispense Refill     acetaminophen (TYLENOL) 325 MG tablet Take 2 tablets (650 mg) by mouth every 4 hours as needed for mild pain       acetaminophen (TYLENOL) 500 MG tablet Take 1,000 mg by mouth every 6 hours       amLODIPine (NORVASC) 10 MG tablet Take 1 tablet (10 mg) by mouth daily 90 tablet 1     Calcium Carbonate (CALCIUM 600 PO) Take 1 tablet by mouth 2 times daily        Cholecalciferol (VITAMIN D3) 2000 UNITS CAPS Take 2,000 Units by mouth daily (with dinner)       cloNIDine (CATAPRES) 0.1 MG tablet TAKE ONE TABLET BY MOUTH TWO TIMES A  tablet 0     estradiol (ESTRACE) 0.1 MG/GM vaginal cream Place 2 g vaginally three times a week paraben-free 6 g 3     fish oil-omega-3 fatty acids 1000 MG capsule Take 1 g by mouth daily       gabapentin (NEURONTIN) 100 MG capsule Take 2 capsules (200 mg) by mouth 2 times daily 120 capsule 0      isosorbide dinitrate (ISORDIL) 10 MG tablet Take 1 tablet (10 mg) by mouth 3 times daily 270 tablet 3     Lidocaine (LIDOCARE) 4 % Patch Apply to low back topically one time a day for pain and remove per schedule       loperamide (IMODIUM) 2 MG capsule Take 1 capsule (2 mg) by mouth 4 times daily as needed for diarrhea       metoprolol succinate ER (TOPROL-XL) 100 MG 24 hr tablet Take 1 tablet (100 mg) by mouth daily 90 tablet 1     Multiple Vitamins-Minerals (PRESERVISION AREDS) CAPS Take 1 capsule by mouth 2 times daily        ondansetron (ZOFRAN-ODT) 4 MG ODT tab Take 1 tablet (4 mg) by mouth every 6 hours as needed for nausea or vomiting       sertraline (ZOLOFT) 100 MG tablet Take 1 tablet (100 mg) by mouth daily 90 tablet 1     tamsulosin (FLOMAX) 0.4 MG capsule Take 1 capsule (0.4 mg) by mouth daily       vitamin C (ASCORBIC ACID) 500 MG tablet Take 500 mg by mouth daily           REVIEW OF SYSTEMS:  10 point ROS of systems including Constitutional, Eyes, Respiratory, Cardiovascular, Gastroenterology, Genitourinary, Integumentary, Musculoskeletal, Psychiatric were all negative except for pertinent positives noted in my HPI.    Objective:  /54   Pulse 64   Temp 97  F (36.1  C)   Resp 16   Wt 54 kg (119 lb 1.6 oz)   SpO2 98%   BMI 22.50 kg/m     Exam:  Exam:  GENERAL APPEARANCE:  Alert, in no distress, thin  ENT:  Mouth and posterior oropharynx normal, moist mucous membranes, [place]  EYES:  EOM, conjunctivae, lids, pupils and irises normal, PERRL  RESP:  respiratory effort and palpation of chest normal, lungs clear to auscultation , no respiratory distress  CV:  Palpation and auscultation of heart done , regular rate and rhythm, no murmur, rub, or gallop, no edema  ABDOMEN:  normal bowel sounds, soft, nontender, no hepatosplenomegaly or other masses  M/S:   Examination of:   right upper extremity, left upper extremity, right lower extremity and left lower extremity  Inspection, ROM, stability and  muscle strength normal and generalized weakness noted  SKIN:  Inspection of skin and subcutaneous tissue baseline  NEURO:   Cranial nerves 2-12 are normal tested and grossly at patient's baseline, speech WNL  PSYCH:  affect and mood normal    Labs:   Recent labs in [facility] reviewed by me today.       ASSESSMENT/PLAN:  Low back pain/  Ongoing: tylenol 1000mg q 6 hours while awake, increase neurontin to 200mg TID, continue lidocaine patch 4% to low back, on AM and Off Hs. Continue PT and OT for strengthening    Generalized weakness  Urinary retention  Personal history of urinary tract infection  Acute/ongoing: no recent Abx for UTI, urology consult, cystocopy WNL, continue flomax 0.4mg QD, uses estrace vaginal cream M-W-F  No further PVR  Urology appt as directed     Anemia due to blood loss, acute  Thrombocytopenia (H)  Acute/ongoing: CBC follow,   Hgb 8.1 at discharge and Plt 100K, stable in TCU     Essential hypertension  Coronary artery disease involving native coronary artery of native heart without angina pectoris  Heart failure with preserved ejection fraction, unspecified HF chronicity (H)  Ongoing: daily weights, vitals daily and prn, BMP follow, continue toprol xl 100mg QD, clonidine 0.1mg BID, norvasc 10mg QD, isordil 10mg TID      Diarrhea, unspecified type  Hx of Clostridium difficile infection  Acute/ongoing: continue imodium 2mg QID prn    Orders written by provider at facility:   No new orders today on exam     Electronically signed by:  Tonya Lynn Haase, APRN CNP             Sincerely,        Tonya Lynn Haase, APRN CNP

## 2019-08-15 ENCOUNTER — TRANSFERRED RECORDS (OUTPATIENT)
Dept: HEALTH INFORMATION MANAGEMENT | Facility: CLINIC | Age: 84
End: 2019-08-15

## 2019-08-15 VITALS
TEMPERATURE: 97.2 F | DIASTOLIC BLOOD PRESSURE: 58 MMHG | SYSTOLIC BLOOD PRESSURE: 131 MMHG | HEART RATE: 73 BPM | WEIGHT: 119.2 LBS | RESPIRATION RATE: 18 BRPM | BODY MASS INDEX: 22.52 KG/M2 | OXYGEN SATURATION: 94 %

## 2019-08-15 LAB
ANION GAP SERPL CALCULATED.3IONS-SCNC: 8 MMOL/L (ref 7–16)
BUN SERPL-MCNC: 16 MG/DL (ref 7–26)
CALCIUM SERPL-MCNC: 9.6 MG/DL (ref 8.4–10.4)
CHLORIDE SERPLBLD-SCNC: 104 MMOL/L (ref 98–109)
CO2 SERPL-SCNC: 27 MMOL/L (ref 20–29)
CREAT SERPL-MCNC: 0.64 MG/DL (ref 0.55–1.02)
ERYTHROCYTE [DISTWIDTH] IN BLOOD BY AUTOMATED COUNT: 15.6 % (ref 11.9–15.5)
GFR SERPL CREATININE-BSD FRML MDRD: >60 ML/MIN/1.73M2
GLUCOSE SERPL-MCNC: 91 MG/DL (ref 70–100)
HCT VFR BLD AUTO: 26.6 % (ref 34.9–44.5)
HEMOGLOBIN: 8.6 G/DL (ref 12–15.5)
MCH RBC QN AUTO: 31 PG (ref 27.6–33.3)
MCHC RBC AUTO-ENTMCNC: 32.3 G/DL (ref 31.5–35.2)
MCV RBC AUTO: 96 FL (ref 80–100)
PLATELET # BLD AUTO: 184 X10(9)/L (ref 150–450)
POTASSIUM SERPL-SCNC: 3.6 MMOL/L (ref 3.5–5.1)
RBC # BLD AUTO: 2.77 X10(12)/L (ref 3.9–5.03)
SODIUM SERPL-SCNC: 139 MMOL/L (ref 136–145)
WBC # BLD AUTO: 4.2 X10(9)/L (ref 3.5–10.5)

## 2019-08-15 NOTE — TELEPHONE ENCOUNTER
No patient definitely needs to be seen. I would recommend she is put in with any provider in [place] or [place] within 7-10 days, does not have to be PCP.

## 2019-08-16 ENCOUNTER — DISCHARGE SUMMARY NURSING HOME (OUTPATIENT)
Dept: GERIATRICS | Facility: CLINIC | Age: 84
End: 2019-08-16
Payer: MEDICARE

## 2019-08-16 DIAGNOSIS — I50.30 HEART FAILURE WITH PRESERVED EJECTION FRACTION, UNSPECIFIED HF CHRONICITY (H): ICD-10-CM

## 2019-08-16 DIAGNOSIS — M54.40 BILATERAL LOW BACK PAIN WITH SCIATICA, SCIATICA LATERALITY UNSPECIFIED, UNSPECIFIED CHRONICITY: Primary | ICD-10-CM

## 2019-08-16 DIAGNOSIS — I10 ESSENTIAL HYPERTENSION: ICD-10-CM

## 2019-08-16 DIAGNOSIS — R33.9 URINARY RETENTION: ICD-10-CM

## 2019-08-16 DIAGNOSIS — Z87.440 PERSONAL HISTORY OF URINARY TRACT INFECTION: ICD-10-CM

## 2019-08-16 DIAGNOSIS — D69.6 THROMBOCYTOPENIA (H): ICD-10-CM

## 2019-08-16 DIAGNOSIS — R53.1 GENERALIZED WEAKNESS: ICD-10-CM

## 2019-08-16 DIAGNOSIS — I25.10 CORONARY ARTERY DISEASE INVOLVING NATIVE CORONARY ARTERY OF NATIVE HEART WITHOUT ANGINA PECTORIS: ICD-10-CM

## 2019-08-16 DIAGNOSIS — D62 ANEMIA DUE TO BLOOD LOSS, ACUTE: ICD-10-CM

## 2019-08-16 PROCEDURE — 99316 NF DSCHRG MGMT 30 MIN+: CPT | Performed by: NURSE PRACTITIONER

## 2019-08-16 NOTE — LETTER
8/16/2019        RE: Daya Ignacio  [address]  [facility] [address]        [place] [facility] DISCHARGE SUMMARY  PATIENT'S NAME: Daya Ignacio  YOB: 1930  MEDICAL RECORD NUMBER:  [id number]  Place of Service where encounter took place:  [facility] ([facility]) [[id number]]    PRIMARY CARE PROVIDER AND CLINIC RESPONSIBLE AFTER TRANSFER:   Marcy Soares PA-C, [id number] / [facility] [id number]    [facility] Provider     Transferring providers: Tonya Lynn Haase, APRN CNP, Dr. Jose Francisco MD  Recent Hospitalization/ED:  [facility] stay 7/28/19 to 8/2/19.  Date of SNF Admission: August / 02 / 2019  Date of SNF (anticipated) Discharge: August / 18 / 2019  [facility]: [facility]  Cognitive Scores: BIMS=15/15, SBT=6/28  Physical Function: Ambulating 150 ft with RW indep  DME: Walker    CODE STATUS/ADVANCE DIRECTIVES DISCUSSION:  Full Code     ALLERGIES: Atorvastatin; Macrobid [nitrofurantoin anhydrous]; and Sulfa drugs    DISCHARGE DIAGNOSIS/NURSING FACILITY COURSE:   Patient progressed in therapy to walking up to 150 feet using a 4WW indep, indep with ADL's has chronic ongoing back pain which did flare here in TCU with therapy, started lidocaine patch with good relief. Patient will DC home with Home PT, RN and HHA through [facility].     Past Medical History:  has a past medical history of 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 (4/16/2014), Closed fracture of unspecified part of neck of femur, Closed fracture of unspecified part of vertebral column without mention of spinal cord injury, Clostridium difficile colitis (06/2019), Hypertension, Lumbar compression fracture (H), Macular degeneration (senile) of retina, unspecified, Upper GI bleed (06/2019), and Urinary tract infection due to extended-spectrum beta lactamase (ESBL)-producing Klebsiella (05/2019).    Discharge Medications:  Current Outpatient  Medications   Medication Sig Dispense Refill     acetaminophen (TYLENOL) 325 MG tablet Take 2 tablets (650 mg) by mouth every 4 hours as needed for mild pain       acetaminophen (TYLENOL) 500 MG tablet Take 1,000 mg by mouth every 6 hours       amLODIPine (NORVASC) 10 MG tablet Take 1 tablet (10 mg) by mouth daily 90 tablet 1     Calcium Carbonate (CALCIUM 600 PO) Take 1 tablet by mouth 2 times daily        Cholecalciferol (VITAMIN D3) 2000 UNITS CAPS Take 2,000 Units by mouth daily (with dinner)       cloNIDine (CATAPRES) 0.1 MG tablet TAKE ONE TABLET BY MOUTH TWO TIMES A  tablet 0     estradiol (ESTRACE) 0.1 MG/GM vaginal cream Place 2 g vaginally three times a week paraben-free 6 g 3     fish oil-omega-3 fatty acids 1000 MG capsule Take 1 g by mouth daily       gabapentin (NEURONTIN) 100 MG capsule Take 2 capsules (200 mg) by mouth 2 times daily 120 capsule 0     isosorbide dinitrate (ISORDIL) 10 MG tablet Take 1 tablet (10 mg) by mouth 3 times daily 270 tablet 3     Lidocaine (LIDOCARE) 4 % Patch Apply to low back topically one time a day for pain and remove per schedule       loperamide (IMODIUM) 2 MG capsule Take 1 capsule (2 mg) by mouth 4 times daily as needed for diarrhea       metoprolol succinate ER (TOPROL-XL) 100 MG 24 hr tablet Take 1 tablet (100 mg) by mouth daily 90 tablet 1     Multiple Vitamins-Minerals (PRESERVISION AREDS) CAPS Take 1 capsule by mouth 2 times daily        ondansetron (ZOFRAN-ODT) 4 MG ODT tab Take 1 tablet (4 mg) by mouth every 6 hours as needed for nausea or vomiting       sertraline (ZOLOFT) 100 MG tablet Take 1 tablet (100 mg) by mouth daily 90 tablet 1     tamsulosin (FLOMAX) 0.4 MG capsule Take 1 capsule (0.4 mg) by mouth daily       vitamin C (ASCORBIC ACID) 500 MG tablet Take 500 mg by mouth daily         Medication Changes/Rationale:     Scheduled tylenol 1000mg q 6 hours during TCU stay for acute on chronic low back pain    Increased neurtontinn to 200mg TID and  started a lidocaine patch 4% to low back on AM and off hs for low back pain     Controlled medications sent with patient:   not applicable/none     ROS:   10 point ROS of systems including Constitutional, Eyes, Respiratory, Cardiovascular, Gastroenterology, Genitourinary, Integumentary, Musculoskeletal, Psychiatric were all negative except for pertinent positives noted in my HPI.    Physical Exam:   Vitals: /58   Pulse 73   Temp 97.2  F (36.2  C)   Resp 18   Wt 54.1 kg (119 lb 3.2 oz)   SpO2 94%   BMI 22.52 kg/m     BMI= Body mass index is 22.52 kg/m .  Exam:  GENERAL APPEARANCE:  Alert, in no distress, thin  ENT:  Mouth and posterior oropharynx normal, moist mucous membranes, [place]  EYES:  EOM, conjunctivae, lids, pupils and irises normal, PERRL  RESP:  respiratory effort and palpation of chest normal, lungs clear to auscultation , no respiratory distress  CV:  Palpation and auscultation of heart done , regular rate and rhythm, no murmur, rub, or gallop, no edema  ABDOMEN:  normal bowel sounds, soft, nontender, no hepatosplenomegaly or other masses  M/S:   Examination of:   right upper extremity, left upper extremity, right lower extremity and left lower extremity  Inspection, ROM, stability and muscle strength normal and generalized weakness noted  SKIN:  Inspection of skin and subcutaneous tissue baseline  NEURO:   Cranial nerves 2-12 are normal tested and grossly at patient's baseline, speech WNL  PSYCH:  affect and mood normal     SNF labs: Recent labs in [facility] reviewed by me today.       ASSESSMENT/PLAN:  Low back pain/  Ongoing: tylenol 1000mg q 6 hours prn,  neurontin to 200mg TID, continue lidocaine patch 4% to low back, on AM and Off Hs.   [facility] RN and HHA [facility].      Generalized weakness  Urinary retention  Personal history of urinary tract infection  Acute/ongoing: no recent Abx for UTI, urology consult, cystocopy WNL, continue flomax 0.4mg QD, uses  estrace vaginal cream M-W-F  Urology appt as directed     Anemia due to blood loss, acute  Thrombocytopenia (H)  Acute/ongoing:   Hgb 8.1 at discharge and Plt 100K, stable in TCU     Essential hypertension  Coronary artery disease involving native coronary artery of native heart without angina pectoris  Heart failure with preserved ejection fraction, unspecified HF chronicity (H)  Ongoing: continue toprol xl 100mg QD, clonidine 0.1mg BID, norvasc 10mg QD, isordil 10mg TID      Diarrhea, unspecified type  Hx of Clostridium difficile infection  Acute/ongoing: continue imodium 2mg QID prn    DISCHARGE PLAN:    Follow up labs: No labs orders/due    Medical Follow Up:      Follow up with primary care provider in 1-2 weeks    MTM referral needed and placed by this provider: Ryann    Current [place] scheduled appointments:       Discharge Services: Home Care:  Physical Therapy, Registered Nurse and Home Health Aide    Discharge Instructions Verbalized to Patient at Discharge:     None      TOTAL DISCHARGE TIME:   Greater than 30 minutes  Electronically signed by:  Tonya Lynn Haase, APRN CNP                       Sincerely,        Tonya Lynn Haase, APRN CNP

## 2019-08-16 NOTE — PROGRESS NOTES
[place] [facility] DISCHARGE SUMMARY  PATIENT'S NAME: Daya Ignacio  YOB: 1930  MEDICAL RECORD NUMBER:  [id number]  Place of Service where encounter took place:  [facility] ([facility]) [[id number]]    PRIMARY CARE PROVIDER AND CLINIC RESPONSIBLE AFTER TRANSFER:   Marcy Soares PA-C, [id number] [facility] [address]    [facility] Provider     Transferring providers: Tonya Lynn Haase, APRN CNP, Dr. Jose Francisco MD  Recent Hospitalization/ED:  [facility] stay 7/28/19 to 8/2/19.  Date of SNF Admission: August / 02 / 2019  Date of SNF (anticipated) Discharge: August / 18 / 2019  Discharged to: previous independent living facility [facility]  Cognitive Scores: BIMS=15/15, SBT=6/28  Physical Function: Ambulating 150 ft with RW indep  DME: Walker    CODE STATUS/ADVANCE DIRECTIVES DISCUSSION:  Full Code     ALLERGIES: Atorvastatin; Macrobid [nitrofurantoin anhydrous]; and Sulfa drugs    DISCHARGE DIAGNOSIS/NURSING FACILITY COURSE:   Patient progressed in therapy to walking up to 150 feet using a 4WW indep, indep with ADL's has chronic ongoing back pain which did flare here in TCU with therapy, started lidocaine patch with good relief. Patient will DC home with Home PT, RN and HHA through [facility].     Past Medical History:  has a past medical history of 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 (4/16/2014), Closed fracture of unspecified part of neck of femur, Closed fracture of unspecified part of vertebral column without mention of spinal cord injury, Clostridium difficile colitis (06/2019), Hypertension, Lumbar compression fracture (H), Macular degeneration (senile) of retina, unspecified, Upper GI bleed (06/2019), and Urinary tract infection due to extended-spectrum beta lactamase (ESBL)-producing Klebsiella (05/2019).    Discharge Medications:  Current Outpatient Medications   Medication Sig Dispense Refill     acetaminophen (TYLENOL) 325 MG tablet Take 2 tablets (650 mg)  by mouth every 4 hours as needed for mild pain       acetaminophen (TYLENOL) 500 MG tablet Take 1,000 mg by mouth every 6 hours       amLODIPine (NORVASC) 10 MG tablet Take 1 tablet (10 mg) by mouth daily 90 tablet 1     Calcium Carbonate (CALCIUM 600 PO) Take 1 tablet by mouth 2 times daily        Cholecalciferol (VITAMIN D3) 2000 UNITS CAPS Take 2,000 Units by mouth daily (with dinner)       cloNIDine (CATAPRES) 0.1 MG tablet TAKE ONE TABLET BY MOUTH TWO TIMES A  tablet 0     estradiol (ESTRACE) 0.1 MG/GM vaginal cream Place 2 g vaginally three times a week paraben-free 6 g 3     fish oil-omega-3 fatty acids 1000 MG capsule Take 1 g by mouth daily       gabapentin (NEURONTIN) 100 MG capsule Take 2 capsules (200 mg) by mouth 2 times daily 120 capsule 0     isosorbide dinitrate (ISORDIL) 10 MG tablet Take 1 tablet (10 mg) by mouth 3 times daily 270 tablet 3     Lidocaine (LIDOCARE) 4 % Patch Apply to low back topically one time a day for pain and remove per schedule       loperamide (IMODIUM) 2 MG capsule Take 1 capsule (2 mg) by mouth 4 times daily as needed for diarrhea       metoprolol succinate ER (TOPROL-XL) 100 MG 24 hr tablet Take 1 tablet (100 mg) by mouth daily 90 tablet 1     Multiple Vitamins-Minerals (PRESERVISION AREDS) CAPS Take 1 capsule by mouth 2 times daily        ondansetron (ZOFRAN-ODT) 4 MG ODT tab Take 1 tablet (4 mg) by mouth every 6 hours as needed for nausea or vomiting       sertraline (ZOLOFT) 100 MG tablet Take 1 tablet (100 mg) by mouth daily 90 tablet 1     tamsulosin (FLOMAX) 0.4 MG capsule Take 1 capsule (0.4 mg) by mouth daily       vitamin C (ASCORBIC ACID) 500 MG tablet Take 500 mg by mouth daily         Medication Changes/Rationale:     Scheduled tylenol 1000mg q 6 hours during TCU stay for acute on chronic low back pain    Increased neurtontinn to 200mg TID and started a lidocaine patch 4% to low back on AM and off hs for low back pain     Controlled medications sent with  patient:   not applicable/none     ROS:   10 point ROS of systems including Constitutional, Eyes, Respiratory, Cardiovascular, Gastroenterology, Genitourinary, Integumentary, Musculoskeletal, Psychiatric were all negative except for pertinent positives noted in my HPI.    Physical Exam:   Vitals: /58   Pulse 73   Temp 97.2  F (36.2  C)   Resp 18   Wt 54.1 kg (119 lb 3.2 oz)   SpO2 94%   BMI 22.52 kg/m    BMI= Body mass index is 22.52 kg/m .  Exam:  GENERAL APPEARANCE:  Alert, in no distress, thin  ENT:  Mouth and posterior oropharynx normal, moist mucous membranes, [place]  EYES:  EOM, conjunctivae, lids, pupils and irises normal, PERRL  RESP:  respiratory effort and palpation of chest normal, lungs clear to auscultation , no respiratory distress  CV:  Palpation and auscultation of heart done , regular rate and rhythm, no murmur, rub, or gallop, no edema  ABDOMEN:  normal bowel sounds, soft, nontender, no hepatosplenomegaly or other masses  M/S:   Examination of:   right upper extremity, left upper extremity, right lower extremity and left lower extremity  Inspection, ROM, stability and muscle strength normal and generalized weakness noted  SKIN:  Inspection of skin and subcutaneous tissue baseline  NEURO:   Cranial nerves 2-12 are normal tested and grossly at patient's baseline, speech WNL  PSYCH:  affect and mood normal     SNF labs: Recent labs in [facility] reviewed by me today.       ASSESSMENT/PLAN:  Low back pain/  Ongoing: tylenol 1000mg q 6 hours prn,  neurontin to 200mg TID, continue lidocaine patch 4% to low back, on AM and Off Hs.   DC back to [place] [facility] with home PT, RN and HHA through [facility].      Generalized weakness  Urinary retention  Personal history of urinary tract infection  Acute/ongoing: no recent Abx for UTI, urology consult, cystocopy WNL, continue flomax 0.4mg QD, uses estrace vaginal cream M-W-F  Urology appt as directed     Anemia due to blood loss, acute  Thrombocytopenia  (H)  Acute/ongoing:   Hgb 8.1 at discharge and Plt 100K, stable in TCU     Essential hypertension  Coronary artery disease involving native coronary artery of native heart without angina pectoris  Heart failure with preserved ejection fraction, unspecified HF chronicity (H)  Ongoing: continue toprol xl 100mg QD, clonidine 0.1mg BID, norvasc 10mg QD, isordil 10mg TID      Diarrhea, unspecified type  Hx of Clostridium difficile infection  Acute/ongoing: continue imodium 2mg QID prn    DISCHARGE PLAN:    Follow up labs: No labs orders/due    Medical Follow Up:      Follow up with primary care provider in 1-2 weeks    MTM referral needed and placed by this provider: No    Current [place] scheduled appointments:       Discharge Services: Home Care:  Physical Therapy, Registered Nurse and Home Health Aide    Discharge Instructions Verbalized to Patient at Discharge:     None      TOTAL DISCHARGE TIME:   Greater than 30 minutes  Electronically signed by:  Tonya Lynn Haase, APRN CNP

## 2019-08-17 ENCOUNTER — TELEPHONE (OUTPATIENT)
Dept: GERIATRICS | Facility: CLINIC | Age: 84
End: 2019-08-17

## 2019-08-17 ENCOUNTER — TRANSFERRED RECORDS (OUTPATIENT)
Dept: HEALTH INFORMATION MANAGEMENT | Facility: CLINIC | Age: 84
End: 2019-08-17

## 2019-08-17 NOTE — TELEPHONE ENCOUNTER
Resident c/o feeling full like retaining urine and feels like she has in past when she has had a UTI    Ok to get a UA/UC    Electronically signed by Frannie Morris RN, CNP

## 2019-08-20 ENCOUNTER — TELEPHONE (OUTPATIENT)
Dept: FAMILY MEDICINE | Facility: CLINIC | Age: 84
End: 2019-08-20

## 2019-08-20 NOTE — TELEPHONE ENCOUNTER
L/M for patient regarding missed appointment. Patient needs to be seen this week by Marcy or another provider. CBC and UA needed STAT. Requested patient call back to speak with a Triage RN.     Susan Miranda, MSN, RN, PHN

## 2019-08-21 ENCOUNTER — TELEPHONE (OUTPATIENT)
Dept: FAMILY MEDICINE | Facility: CLINIC | Age: 84
End: 2019-08-21

## 2019-08-21 ENCOUNTER — APPOINTMENT (OUTPATIENT)
Dept: GENERAL RADIOLOGY | Facility: CLINIC | Age: 84
DRG: 872 | End: 2019-08-21
Attending: EMERGENCY MEDICINE
Payer: MEDICARE

## 2019-08-21 ENCOUNTER — HOSPITAL ENCOUNTER (INPATIENT)
Facility: CLINIC | Age: 84
LOS: 6 days | Discharge: HOME HEALTH | DRG: 872 | End: 2019-08-27
Attending: EMERGENCY MEDICINE | Admitting: INTERNAL MEDICINE
Payer: MEDICARE

## 2019-08-21 DIAGNOSIS — R53.1 GENERALIZED WEAKNESS: ICD-10-CM

## 2019-08-21 DIAGNOSIS — M62.81 GENERALIZED MUSCLE WEAKNESS: ICD-10-CM

## 2019-08-21 DIAGNOSIS — K21.00 GASTROESOPHAGEAL REFLUX DISEASE WITH ESOPHAGITIS: Primary | ICD-10-CM

## 2019-08-21 DIAGNOSIS — M25.559 HIP PAIN: ICD-10-CM

## 2019-08-21 DIAGNOSIS — B96.29 UTI DUE TO EXTENDED-SPECTRUM BETA LACTAMASE (ESBL) PRODUCING ESCHERICHIA COLI: ICD-10-CM

## 2019-08-21 DIAGNOSIS — N39.0 URINARY TRACT INFECTION DUE TO EXTENDED-SPECTRUM BETA LACTAMASE (ESBL) PRODUCING ESCHERICHIA COLI: ICD-10-CM

## 2019-08-21 DIAGNOSIS — F41.1 GENERALIZED ANXIETY DISORDER: ICD-10-CM

## 2019-08-21 DIAGNOSIS — E78.5 HYPERLIPIDEMIA LDL GOAL <130: ICD-10-CM

## 2019-08-21 DIAGNOSIS — S72.009A HIP FRACTURE (H): ICD-10-CM

## 2019-08-21 DIAGNOSIS — M54.30 SCIATICA, UNSPECIFIED LATERALITY: ICD-10-CM
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
## 2019-08-21 DIAGNOSIS — A41.9 SEPSIS (H): ICD-10-CM

## 2019-08-21 DIAGNOSIS — B96.89 URINARY TRACT INFECTION DUE TO EXTENDED-SPECTRUM BETA LACTAMASE (ESBL)-PRODUCING KLEBSIELLA: ICD-10-CM

## 2019-08-21 DIAGNOSIS — Z16.12 UTI DUE TO EXTENDED-SPECTRUM BETA LACTAMASE (ESBL) PRODUCING ESCHERICHIA COLI: ICD-10-CM

## 2019-08-21 DIAGNOSIS — R53.81 PHYSICAL DECONDITIONING: ICD-10-CM

## 2019-08-21 DIAGNOSIS — K92.2 UPPER GI BLEED: ICD-10-CM

## 2019-08-21 DIAGNOSIS — R79.89 ELEVATED TROPONIN: ICD-10-CM

## 2019-08-21 DIAGNOSIS — N39.0 URINARY TRACT INFECTION WITHOUT HEMATURIA, SITE UNSPECIFIED: ICD-10-CM

## 2019-08-21 DIAGNOSIS — B96.29 URINARY TRACT INFECTION DUE TO EXTENDED-SPECTRUM BETA LACTAMASE (ESBL) PRODUCING ESCHERICHIA COLI: ICD-10-CM

## 2019-08-21 DIAGNOSIS — I10 ESSENTIAL HYPERTENSION: ICD-10-CM

## 2019-08-21 DIAGNOSIS — N39.0 UTI DUE TO EXTENDED-SPECTRUM BETA LACTAMASE (ESBL) PRODUCING ESCHERICHIA COLI: ICD-10-CM

## 2019-08-21 DIAGNOSIS — G89.29 OTHER CHRONIC PAIN: ICD-10-CM

## 2019-08-21 DIAGNOSIS — N39.0 UTI (URINARY TRACT INFECTION): ICD-10-CM

## 2019-08-21 DIAGNOSIS — E86.0 DEHYDRATION: ICD-10-CM

## 2019-08-21 DIAGNOSIS — Z16.12 URINARY TRACT INFECTION DUE TO EXTENDED-SPECTRUM BETA LACTAMASE (ESBL) PRODUCING ESCHERICHIA COLI: ICD-10-CM

## 2019-08-21 DIAGNOSIS — E87.6 HYPOKALEMIA: ICD-10-CM

## 2019-08-21 DIAGNOSIS — A04.72 CLOSTRIDIUM DIFFICILE COLITIS: ICD-10-CM

## 2019-08-21 DIAGNOSIS — M81.0 OSTEOPOROSIS: ICD-10-CM

## 2019-08-21 DIAGNOSIS — N39.0 URINARY TRACT INFECTION DUE TO EXTENDED-SPECTRUM BETA LACTAMASE (ESBL)-PRODUCING KLEBSIELLA: ICD-10-CM

## 2019-08-21 DIAGNOSIS — S72.102D CLOSED FRACTURE OF TROCHANTER OF LEFT FEMUR WITH ROUTINE HEALING, SUBSEQUENT ENCOUNTER: ICD-10-CM

## 2019-08-21 DIAGNOSIS — R65.21 SEPTIC SHOCK (H): ICD-10-CM

## 2019-08-21 DIAGNOSIS — A04.72 C. DIFFICILE COLITIS: ICD-10-CM

## 2019-08-21 DIAGNOSIS — N39.0 SEPSIS SECONDARY TO UTI (H): ICD-10-CM

## 2019-08-21 DIAGNOSIS — I21.4 NSTEMI (NON-ST ELEVATED MYOCARDIAL INFARCTION) (H): ICD-10-CM

## 2019-08-21 DIAGNOSIS — A41.9 SEPSIS SECONDARY TO UTI (H): ICD-10-CM

## 2019-08-21 DIAGNOSIS — M54.40 BILATERAL LOW BACK PAIN WITH SCIATICA, SCIATICA LATERALITY UNSPECIFIED, UNSPECIFIED CHRONICITY: ICD-10-CM

## 2019-08-21 DIAGNOSIS — I10 HYPERTENSION GOAL BP (BLOOD PRESSURE) < 140/90: ICD-10-CM

## 2019-08-21 DIAGNOSIS — N39.0 URINARY TRACT INFECTION: ICD-10-CM

## 2019-08-21 DIAGNOSIS — H34.8312 BRANCH RETINAL VEIN OCCLUSION OF RIGHT EYE (H): ICD-10-CM

## 2019-08-21 DIAGNOSIS — N17.9 AKI (ACUTE KIDNEY INJURY) (H): ICD-10-CM

## 2019-08-21 DIAGNOSIS — R19.7 DIARRHEA, UNSPECIFIED TYPE: ICD-10-CM

## 2019-08-21 DIAGNOSIS — A41.9 SEPTIC SHOCK (H): ICD-10-CM

## 2019-08-21 DIAGNOSIS — Z71.89 ADVANCED DIRECTIVES, COUNSELING/DISCUSSION: ICD-10-CM

## 2019-08-21 DIAGNOSIS — S32.020S CLOSED COMPRESSION FRACTURE OF SECOND LUMBAR VERTEBRA, SEQUELA: ICD-10-CM

## 2019-08-21 DIAGNOSIS — E55.9 VITAMIN D DEFICIENCY: ICD-10-CM

## 2019-08-21 LAB
ALBUMIN SERPL-MCNC: 3 G/DL (ref 3.4–5)
ALBUMIN UR-MCNC: 30 MG/DL
ALP SERPL-CCNC: 96 U/L (ref 40–150)
ALT SERPL W P-5'-P-CCNC: 52 U/L (ref 0–50)
AMORPH CRY #/AREA URNS HPF: ABNORMAL /HPF
ANION GAP SERPL CALCULATED.3IONS-SCNC: 9 MMOL/L (ref 3–14)
APPEARANCE UR: ABNORMAL
AST SERPL W P-5'-P-CCNC: 71 U/L (ref 0–45)
BACTERIA #/AREA URNS HPF: ABNORMAL /HPF
BASOPHILS # BLD AUTO: 0 10E9/L (ref 0–0.2)
BASOPHILS NFR BLD AUTO: 0.3 %
BILIRUB SERPL-MCNC: 2.2 MG/DL (ref 0.2–1.3)
BILIRUB UR QL STRIP: NEGATIVE
BUN SERPL-MCNC: 34 MG/DL (ref 7–30)
CALCIUM SERPL-MCNC: 8.2 MG/DL (ref 8.5–10.1)
CHLORIDE SERPL-SCNC: 100 MMOL/L (ref 94–109)
CO2 BLDCOV-SCNC: 23 MMOL/L (ref 21–28)
CO2 SERPL-SCNC: 22 MMOL/L (ref 20–32)
COLOR UR AUTO: YELLOW
CREAT SERPL-MCNC: 1.02 MG/DL (ref 0.52–1.04)
DIFFERENTIAL METHOD BLD: ABNORMAL
EOSINOPHIL # BLD AUTO: 0 10E9/L (ref 0–0.7)
EOSINOPHIL NFR BLD AUTO: 0.1 %
ERYTHROCYTE [DISTWIDTH] IN BLOOD BY AUTOMATED COUNT: 17.2 % (ref 10–15)
GFR SERPL CREATININE-BSD FRML MDRD: 49 ML/MIN/{1.73_M2}
GLUCOSE SERPL-MCNC: 119 MG/DL (ref 70–99)
GLUCOSE UR STRIP-MCNC: NEGATIVE MG/DL
HCT VFR BLD AUTO: 35 % (ref 35–47)
HGB BLD-MCNC: 11.3 G/DL (ref 11.7–15.7)
HGB UR QL STRIP: NEGATIVE
IMM GRANULOCYTES # BLD: 0.1 10E9/L (ref 0–0.4)
IMM GRANULOCYTES NFR BLD: 0.8 %
KETONES UR STRIP-MCNC: NEGATIVE MG/DL
LACTATE BLD-SCNC: 4.7 MMOL/L (ref 0.7–2.1)
LACTATE SERPL-SCNC: 2.2 MMOL/L (ref 0.4–2)
LEUKOCYTE ESTERASE UR QL STRIP: ABNORMAL
LYMPHOCYTES # BLD AUTO: 0.5 10E9/L (ref 0.8–5.3)
LYMPHOCYTES NFR BLD AUTO: 3 %
MCH RBC QN AUTO: 31.6 PG (ref 26.5–33)
MCHC RBC AUTO-ENTMCNC: 32.3 G/DL (ref 31.5–36.5)
MCV RBC AUTO: 98 FL (ref 78–100)
MONOCYTES # BLD AUTO: 0.5 10E9/L (ref 0–1.3)
MONOCYTES NFR BLD AUTO: 3.2 %
MUCOUS THREADS #/AREA URNS LPF: PRESENT /LPF
NEUTROPHILS # BLD AUTO: 14.4 10E9/L (ref 1.6–8.3)
NEUTROPHILS NFR BLD AUTO: 92.6 %
NITRATE UR QL: NEGATIVE
NRBC # BLD AUTO: 0 10*3/UL
NRBC BLD AUTO-RTO: 0 /100
PCO2 BLDV: 41 MM HG (ref 40–50)
PH BLDV: 7.36 PH (ref 7.32–7.43)
PH UR STRIP: 5.5 PH (ref 5–7)
PLATELET # BLD AUTO: 178 10E9/L (ref 150–450)
PO2 BLDV: 18 MM HG (ref 25–47)
POTASSIUM SERPL-SCNC: 3.5 MMOL/L (ref 3.4–5.3)
PROT SERPL-MCNC: 6.7 G/DL (ref 6.8–8.8)
RBC # BLD AUTO: 3.58 10E12/L (ref 3.8–5.2)
RBC #/AREA URNS AUTO: 3 /HPF (ref 0–2)
SAO2 % BLDV FROM PO2: 25 %
SODIUM SERPL-SCNC: 131 MMOL/L (ref 133–144)
SOURCE: ABNORMAL
SP GR UR STRIP: 1.02 (ref 1–1.03)
SQUAMOUS #/AREA URNS AUTO: 1 /HPF (ref 0–1)
UROBILINOGEN UR STRIP-MCNC: NORMAL MG/DL (ref 0–2)
WBC # BLD AUTO: 15.5 10E9/L (ref 4–11)
WBC #/AREA URNS AUTO: 113 /HPF (ref 0–5)

## 2019-08-21 PROCEDURE — 96361 HYDRATE IV INFUSION ADD-ON: CPT

## 2019-08-21 PROCEDURE — 99285 EMERGENCY DEPT VISIT HI MDM: CPT | Mod: 25

## 2019-08-21 PROCEDURE — 36415 COLL VENOUS BLD VENIPUNCTURE: CPT | Performed by: INTERNAL MEDICINE

## 2019-08-21 PROCEDURE — 96365 THER/PROPH/DIAG IV INF INIT: CPT

## 2019-08-21 PROCEDURE — 87040 BLOOD CULTURE FOR BACTERIA: CPT | Performed by: INTERNAL MEDICINE

## 2019-08-21 PROCEDURE — 71046 X-RAY EXAM CHEST 2 VIEWS: CPT

## 2019-08-21 PROCEDURE — 80053 COMPREHEN METABOLIC PANEL: CPT | Performed by: EMERGENCY MEDICINE

## 2019-08-21 PROCEDURE — 99223 1ST HOSP IP/OBS HIGH 75: CPT | Mod: AI | Performed by: INTERNAL MEDICINE

## 2019-08-21 PROCEDURE — 83605 ASSAY OF LACTIC ACID: CPT | Performed by: EMERGENCY MEDICINE

## 2019-08-21 PROCEDURE — 25000128 H RX IP 250 OP 636: Performed by: EMERGENCY MEDICINE

## 2019-08-21 PROCEDURE — 12000000 ZZH R&B MED SURG/OB

## 2019-08-21 PROCEDURE — 87086 URINE CULTURE/COLONY COUNT: CPT | Performed by: INTERNAL MEDICINE

## 2019-08-21 PROCEDURE — 36415 COLL VENOUS BLD VENIPUNCTURE: CPT | Performed by: EMERGENCY MEDICINE

## 2019-08-21 PROCEDURE — 81001 URINALYSIS AUTO W/SCOPE: CPT | Performed by: EMERGENCY MEDICINE

## 2019-08-21 PROCEDURE — 83605 ASSAY OF LACTIC ACID: CPT

## 2019-08-21 PROCEDURE — 82803 BLOOD GASES ANY COMBINATION: CPT

## 2019-08-21 PROCEDURE — 85025 COMPLETE CBC W/AUTO DIFF WBC: CPT | Performed by: EMERGENCY MEDICINE

## 2019-08-21 RX ORDER — ONDANSETRON 2 MG/ML
4 INJECTION INTRAMUSCULAR; INTRAVENOUS EVERY 6 HOURS PRN
Status: DISCONTINUED | OUTPATIENT
Start: 2019-08-21 | End: 2019-08-27 | Stop reason: HOSPADM

## 2019-08-21 RX ORDER — PROCHLORPERAZINE MALEATE 5 MG
5 TABLET ORAL EVERY 6 HOURS PRN
Status: DISCONTINUED | OUTPATIENT
Start: 2019-08-21 | End: 2019-08-27 | Stop reason: HOSPADM

## 2019-08-21 RX ORDER — NALOXONE HYDROCHLORIDE 0.4 MG/ML
.1-.4 INJECTION, SOLUTION INTRAMUSCULAR; INTRAVENOUS; SUBCUTANEOUS
Status: DISCONTINUED | OUTPATIENT
Start: 2019-08-21 | End: 2019-08-27 | Stop reason: HOSPADM

## 2019-08-21 RX ORDER — VANCOMYCIN HYDROCHLORIDE 50 MG/ML
125 KIT ORAL 2 TIMES DAILY
Status: DISCONTINUED | OUTPATIENT
Start: 2019-08-21 | End: 2019-08-27 | Stop reason: HOSPADM

## 2019-08-21 RX ORDER — MEROPENEM 1 G/1
1 INJECTION, POWDER, FOR SOLUTION INTRAVENOUS ONCE
Status: COMPLETED | OUTPATIENT
Start: 2019-08-21 | End: 2019-08-21

## 2019-08-21 RX ORDER — ONDANSETRON 4 MG/1
4 TABLET, ORALLY DISINTEGRATING ORAL EVERY 6 HOURS PRN
Status: DISCONTINUED | OUTPATIENT
Start: 2019-08-21 | End: 2019-08-27 | Stop reason: HOSPADM

## 2019-08-21 RX ORDER — ACETAMINOPHEN 650 MG/1
650 SUPPOSITORY RECTAL EVERY 4 HOURS PRN
Status: DISCONTINUED | OUTPATIENT
Start: 2019-08-21 | End: 2019-08-27 | Stop reason: HOSPADM

## 2019-08-21 RX ORDER — SODIUM CHLORIDE 9 MG/ML
INJECTION, SOLUTION INTRAVENOUS CONTINUOUS
Status: DISCONTINUED | OUTPATIENT
Start: 2019-08-21 | End: 2019-08-22

## 2019-08-21 RX ORDER — PROCHLORPERAZINE 25 MG
12.5 SUPPOSITORY, RECTAL RECTAL EVERY 12 HOURS PRN
Status: DISCONTINUED | OUTPATIENT
Start: 2019-08-21 | End: 2019-08-27 | Stop reason: HOSPADM

## 2019-08-21 RX ORDER — MEROPENEM 1 G/1
1 INJECTION, POWDER, FOR SOLUTION INTRAVENOUS EVERY 12 HOURS
Status: DISCONTINUED | OUTPATIENT
Start: 2019-08-22 | End: 2019-08-23

## 2019-08-21 RX ORDER — IBUPROFEN 200 MG
200 TABLET ORAL EVERY 4 HOURS PRN
Status: ON HOLD | COMMUNITY
End: 2019-08-27

## 2019-08-21 RX ORDER — ACETAMINOPHEN 325 MG/1
650 TABLET ORAL EVERY 4 HOURS PRN
Status: DISCONTINUED | OUTPATIENT
Start: 2019-08-21 | End: 2019-08-27 | Stop reason: HOSPADM

## 2019-08-21 RX ORDER — LIDOCAINE 40 MG/G
CREAM TOPICAL
Status: DISCONTINUED | OUTPATIENT
Start: 2019-08-21 | End: 2019-08-22

## 2019-08-21 RX ORDER — LIDOCAINE 40 MG/G
CREAM TOPICAL
Status: DISCONTINUED | OUTPATIENT
Start: 2019-08-21 | End: 2019-08-27 | Stop reason: HOSPADM

## 2019-08-21 RX ORDER — NITROGLYCERIN 0.4 MG/1
0.4 TABLET SUBLINGUAL EVERY 5 MIN PRN
Status: DISCONTINUED | OUTPATIENT
Start: 2019-08-21 | End: 2019-08-27 | Stop reason: HOSPADM

## 2019-08-21 RX ADMIN — MEROPENEM 1 G: 1 INJECTION, POWDER, FOR SOLUTION INTRAVENOUS at 20:40

## 2019-08-21 RX ADMIN — SODIUM CHLORIDE 1000 ML: 9 INJECTION, SOLUTION INTRAVENOUS at 17:18

## 2019-08-21 RX ADMIN — SODIUM CHLORIDE 1000 ML: 9 INJECTION, SOLUTION INTRAVENOUS at 20:14

## 2019-08-21 ASSESSMENT — ENCOUNTER SYMPTOMS
COUGH: 0
HEMATURIA: 0
DYSURIA: 0
VOMITING: 1
FEVER: 0
BACK PAIN: 1
DIFFICULTY URINATING: 0
WEAKNESS: 1
FREQUENCY: 0
SHORTNESS OF BREATH: 1

## 2019-08-21 NOTE — TELEPHONE ENCOUNTER
Can we call home health to see if they can assess pt today?     If not, call son and see if there is another # to call pt at.

## 2019-08-21 NOTE — ED PROVIDER NOTES
History     Chief Complaint:  Shortness of Breath and Generalized Weakness    The history is provided by the patient and a relative.      Daya Ignacio is a 88 year old female, with a history of chronic back pain and recurrent UTI, who presents with her daughter-in-law for shortness of breath and generalized weakness. To note, patient has been staying at [facility] for recurrent UTI and finally went home two days ago. Patient has had UTI's all summer and was prescribed antibiotics when she went home. Daughter-in-law states she took two doses too close together and has been vomiting since then. A few hours ago, patient states she had a sudden onset of generalized weakness before her shortness of breath. Patient was concerned, prompting her to the ED. Here, patient states she also has mild lower back pain that radiates to her abdomen. She denies cough, fever, urinary symptoms, pain with deep breaths, and itching in the groin area.     Allergies:  Atorvastatin  Macrobid   Sulfa Drugs     Medications:    Norvasc   Calcium   Vitamin D3  Catapres  Fish oil-omega-3 fatty acids  Neurontin  Isordil   Imodium   Metoprolol succinate  Multiple vitamins minerals  Zofran   Zoloft   Flomax  Vitamin C     Problem List:    Sepsis secondary to UTI   AYAKA   UTI due to ESBL producing E. Coli   UTI   Lumbar compression fracture   HTN   Upper GI bleed  C. Difficile colitis   Fall   Hypokalemia   Femur fracture   NSTEMI   Chronic pain  Vitamin D deficiency   Hip fracture  HLD   Osteoporosis   Sciatica  LISSA   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   C. diffile colitis   Lumbar compression fracture  Macular degeneration of retina    Past Surgical History:    Left hip surgery   Cystoscopy  EGD  Orthopedic surgery     Family History:    Alzheimer disease  MI - father   Parkinson's disease    Social History:  Negative for tobacco use.  Negative for alcohol use.  Negative for drug use.  Presents with her daughter-in-law.    Marital Status:  Single.     Review of Systems   Constitutional: Negative for fever.   Respiratory: Positive for shortness of breath. Negative for cough.    Gastrointestinal: Positive for vomiting.   Genitourinary: Negative for decreased urine volume, difficulty urinating, dysuria, enuresis, frequency, hematuria and urgency.   Musculoskeletal: Positive for back pain.   Neurological: Positive for weakness.   All other systems reviewed and are negative.        Physical Exam     Patient Vitals for the past 24 hrs:   BP Temp Pulse Heart Rate Resp SpO2 Weight   08/21/19 2025 -- -- 96 -- -- 97 % --   08/21/19 2020 122/49 -- 92 -- -- 97 % --   08/21/19 2010 115/46 -- 93 -- -- 98 % --   08/21/19 2004 -- -- -- -- -- -- 50.8 kg (111 lb 15.9 oz)   08/21/19 1800 105/50 -- 90 88 26 95 % --   08/21/19 1730 110/54 -- 92 92 (!) 35 97 % --   08/21/19 1713 (!) 88/46 99  F (37.2  C) 102 102 18 99 % --     Physical Exam  Constitutional: Alert, attentive  HENT:    Nose: Nose normal.    Mouth/Throat: Oropharynx is clear, mucous membranes are moist   Eyes: EOM are normal.   CV: regular rate and rhythm; no murmurs, rubs or gallups  Chest: Effort normal and breath sounds normal.   GI:  There is no tenderness. No distension. Normal bowel sounds  MSK: Normal range of motion.   Neurological: Alert, attentive  Skin: Skin is warm and dry.      Emergency Department Course   Imaging:  Radiographic findings were communicated with the patient who voiced understanding of the findings.    XR Chest 2 Views   Final Result   Areas of linear atelectasis left base and right base.. Prominence of the interstitial markings. Heart size normal. ASCVD aorta. Degenerative change thoracic spine  As per radiology.      Laboratory:  CBC: WBC: 15.5 (H), HGB: 11.3 (L), PLT: 178  CMP: Glucose 119 (H), Sodium 131 (L), BUN 34 (H), GFR 49 (L), Calcium 8.2 (L), Bilirubin 2.2 (H), Albumin 3.0 (L), Protein 6.7 (L), ALT 52 (H), AST 71 (H), o/w WNL (Creatinine:  1.02)  1735 ISTAT VBG: pH 7.36 / PCO2 41 / PO2 18 (L) / Bicarb 23 / Lactic acid 4.7 (HH)  2033 Lactic acid: 2.2 (H)    Interventions:  1718 NS 1L IV    Emergency Department Course:  1744 Nursing notes and vitals reviewed. I performed an exam of the patient as documented above.     The patient was placed on continuous cardiac monitoring while here in the ED.      IV inserted. Medicine administered as documented above. Blood drawn. This was sent to the lab for further testing, results above.    The patient was sent for a chest XR while in the emergency department, findings above.     1816 I rechecked the patient and discussed the results of her workup thus far.     1834 I rechecked the patient.     2022  I consulted with Dr. Munoz of the hospitalist services. They are in agreement to accept the patient for admission.    Findings and plan explained to the Patient who consents to admission. Discussed the patient with Dr. Munoz, who will admit the patient to an observation bed for further monitoring, evaluation, and treatment.    Impression & Plan    CMS Diagnoses:   The patient has signs of Septic Shock as evidenced by:    1. Presence of Sepsis, AND  2. Lactic Acid level greater than or equal to 4    Time septic shock diagnosis confirmed = 1953 as this was the time when UA resulted with recent lactic > 4.      3 Hour Septic Shock Bundle Completion:  1. Initial Lactic Acid Result:   Recent Labs   Lab Test 08/21/19  2033 08/21/19  1735 06/10/19  2356   LACT 2.2* 4.7* 1.7     2. Blood Cultures before Antibiotics: Yes  3. Broad Spectrum Antibiotics Administered: Yes     Anti-infectives (From admission through now)    Start     Dose/Rate Route Frequency Ordered Stop    08/21/19 2024  meropenem (MERREM) 1 g vial to attach to  mL bag      1 g  over 30 Minutes Intravenous ONCE 08/21/19 2024 08/21/19 2110        4. 2000 ml of IV fluids.  Ideal body weight: 47.8 kg (105 lb 6.1 oz)  Adjusted ideal body weight: 49 kg  (108 lb 0.4 oz)    Severe Sepsis reassessment:  1. Repeat Lactic Acid Level: 2.2    I attest to having performed a repeat sepsis exam and assessment of perfusion at 2100 and the results demonstrate improved perfusion.      Medical Decision Making:  This is an 88-year-old female history of ESBL who presents for evaluation of vomiting the context of recent Macrobid use.  Of note, patient did have some low back pain recently and a UA/urine culture were performed at outside hospital facility which we cannot obtain. Initially tachycardic and hypotensive, her vital signs normalized without intervention but her lactic acid is greater than 4.  We had an extended decision conversation with her son and daughter-in-law regarding recent false positive urine cultures probable ESBL colonization.  Given pyuria, leukocytosis, transient abnormal vital signs, and elevated lactic, this may indicate septic shock due to UTI. Elevated lactate may also be due to dehydration and vomiting.  Based on ID recommendations and consultation with the hospitalist we will initiate meropenem until urine culture was obtained here.  Remainder of the work-up her vital signs are reassuring.  Repeat lactic is much improved. This may be consistent with dehydration due to vomiting and secondary generalized weakness. Plan for admission to med/surg obs status at this time pending hospitalist assessment.    Diagnosis:    ICD-10-CM   1. Generalized muscle weakness M62.81   2. Dehydration E86.0   3. UTI due to extended-spectrum beta lactamase (ESBL) producing Escherichia coli N39.0    B96.29    Z16.12   4. Septic shock (H) A41.9    R65.21     Disposition:  Admitted to Med Surg.     Scribe Disposition  I, Amanda Caballero, am serving as a scribe on 8/21/2019 at 5:46 PM to personally document services performed by German Hebert MD based on my observations and the provider's statements to me.     Amanda Caballero  8/21/2019   [facility] EMERGENCY  [facility] CenterGerman patel MD  08/21/19 [id number]

## 2019-08-21 NOTE — ED TRIAGE NOTES
Patient presents with complaints of SOB and weakness which started this afternoon . Patient states she is also having lower back pain as well.

## 2019-08-21 NOTE — TELEPHONE ENCOUNTER
Called pharmacy here and spoke to Amanda.  Note on RX that Ivonne called patient yesterday and Daya stated she was not allergic to Macrobid just Sulfa.  Note states Ivonne counseled on allergy symptoms.  RX was picked up yesterday.  Called Luma,  [facility] back and updated on above.  Also Luma states talked to son and made aware of appointment with Marcy on Friday.  States son will get her to appt.  Son concerned with many things and feels providers may be too quick to treat her for UTI.  Discussed if Jeff treated due to culture results that culture must have showed something that needs to be treated.  Can discuss further at office visit.  ROBERTA Perdomo calling from  Home [facility] [phone].  [facility] called yesterday when Luma was at her home doing home care admit.  States Jeff said they got culture back and sent RX to our pharmacy for Macrobid.  Macrobid listed as a allergy.  Discussed states body aches as reaction to the Macrobid.  Patient also missed appt yesterday with Marcy.  Needs to follow-up- has low PLT's and HGB and also was to follow-up on the urine.      To come to see Marcy Friday with arrival time of 11 am per Marcy.  Luma is going to call Daya and let her know appt information.     Skilled nurse  1x/1wk  2x/1wk  1x/2wk  2 as needed    PT and OT and social work to evaluate    Verbal order given.  Will fax for MD signature.  Beba Duke RN

## 2019-08-21 NOTE — TELEPHONE ENCOUNTER
[facility] calling back.  Lots of lower back pain.  Back pain rated at a 9 out of 10.   States feels vomiting from Macorbid due to ended up taking 2 doses yesterday afternoon/evening on empty stomach.  Luma concerned with new onset shortness of breath today.  Was not having shortness of breath when she saw her yesterday.  BP- 104/48 P-98 R- 30 Ox- 93%.  Yesterday Ox- 96%.  Lungs clear.  No swelling.  Does not weigh herself.  Shortness of breath started today.  No further vomiting.      Discussed with Marcy.  Due to level of pain, new onset shortness of breath, slight decrease in sat Marcy recommends ER to evaluate further.  Luma states son and patient are in agreement and aware that would likely be recommendation.  Luma will have them go to [facility] ER now.  Beba Duke RN

## 2019-08-21 NOTE — TELEPHONE ENCOUNTER
Luma calling back.  States son called her and said Daya has been throwing up.  Started during the night.  Took one dose of Macrobid yesterday and one this evening.  Tried to call patient to triage further.  Went right to V/M.  Will try again.  Beba Duke RN

## 2019-08-22 ENCOUNTER — TELEPHONE (OUTPATIENT)
Dept: FAMILY MEDICINE | Facility: CLINIC | Age: 84
End: 2019-08-22

## 2019-08-22 ENCOUNTER — APPOINTMENT (OUTPATIENT)
Dept: PHYSICAL THERAPY | Facility: CLINIC | Age: 84
DRG: 872 | End: 2019-08-22
Attending: INTERNAL MEDICINE
Payer: MEDICARE

## 2019-08-22 LAB
ANION GAP SERPL CALCULATED.3IONS-SCNC: 7 MMOL/L (ref 3–14)
BUN SERPL-MCNC: 32 MG/DL (ref 7–30)
CALCIUM SERPL-MCNC: 7.8 MG/DL (ref 8.5–10.1)
CHLORIDE SERPL-SCNC: 105 MMOL/L (ref 94–109)
CO2 SERPL-SCNC: 24 MMOL/L (ref 20–32)
CREAT SERPL-MCNC: 0.86 MG/DL (ref 0.52–1.04)
ERYTHROCYTE [DISTWIDTH] IN BLOOD BY AUTOMATED COUNT: 17.2 % (ref 10–15)
GFR SERPL CREATININE-BSD FRML MDRD: 60 ML/MIN/{1.73_M2}
GLUCOSE SERPL-MCNC: 87 MG/DL (ref 70–99)
HCT VFR BLD AUTO: 33.1 % (ref 35–47)
HGB BLD-MCNC: 10.6 G/DL (ref 11.7–15.7)
LACTATE BLD-SCNC: 2.7 MMOL/L (ref 0.7–2)
LACTATE BLD-SCNC: NORMAL MMOL/L (ref 0.7–2)
MCH RBC QN AUTO: 31.5 PG (ref 26.5–33)
MCHC RBC AUTO-ENTMCNC: 32 G/DL (ref 31.5–36.5)
MCV RBC AUTO: 99 FL (ref 78–100)
PLATELET # BLD AUTO: 105 10E9/L (ref 150–450)
POTASSIUM SERPL-SCNC: 3.5 MMOL/L (ref 3.4–5.3)
RBC # BLD AUTO: 3.36 10E12/L (ref 3.8–5.2)
SODIUM SERPL-SCNC: 136 MMOL/L (ref 133–144)
WBC # BLD AUTO: 11.2 10E9/L (ref 4–11)

## 2019-08-22 PROCEDURE — 12000000 ZZH R&B MED SURG/OB

## 2019-08-22 PROCEDURE — 99232 SBSQ HOSP IP/OBS MODERATE 35: CPT | Performed by: INTERNAL MEDICINE

## 2019-08-22 PROCEDURE — 85027 COMPLETE CBC AUTOMATED: CPT | Performed by: INTERNAL MEDICINE

## 2019-08-22 PROCEDURE — 83605 ASSAY OF LACTIC ACID: CPT | Performed by: INTERNAL MEDICINE

## 2019-08-22 PROCEDURE — 25800030 ZZH RX IP 258 OP 636: Performed by: INTERNAL MEDICINE

## 2019-08-22 PROCEDURE — 25000132 ZZH RX MED GY IP 250 OP 250 PS 637: Mod: GY | Performed by: INTERNAL MEDICINE

## 2019-08-22 PROCEDURE — 36415 COLL VENOUS BLD VENIPUNCTURE: CPT | Performed by: INTERNAL MEDICINE

## 2019-08-22 PROCEDURE — 97530 THERAPEUTIC ACTIVITIES: CPT | Mod: GP

## 2019-08-22 PROCEDURE — 80048 BASIC METABOLIC PNL TOTAL CA: CPT | Performed by: INTERNAL MEDICINE

## 2019-08-22 PROCEDURE — 25000132 ZZH RX MED GY IP 250 OP 250 PS 637: Mod: GY | Performed by: STUDENT IN AN ORGANIZED HEALTH CARE EDUCATION/TRAINING PROGRAM

## 2019-08-22 PROCEDURE — 25000128 H RX IP 250 OP 636: Performed by: INTERNAL MEDICINE

## 2019-08-22 PROCEDURE — 97110 THERAPEUTIC EXERCISES: CPT | Mod: GP

## 2019-08-22 PROCEDURE — 97162 PT EVAL MOD COMPLEX 30 MIN: CPT | Mod: GP

## 2019-08-22 RX ORDER — SERTRALINE HYDROCHLORIDE 100 MG/1
100 TABLET, FILM COATED ORAL DAILY
Status: DISCONTINUED | OUTPATIENT
Start: 2019-08-22 | End: 2019-08-27 | Stop reason: HOSPADM

## 2019-08-22 RX ORDER — ASCORBIC ACID 500 MG
500 TABLET ORAL DAILY
Status: DISCONTINUED | OUTPATIENT
Start: 2019-08-22 | End: 2019-08-27 | Stop reason: HOSPADM

## 2019-08-22 RX ORDER — CHLORAL HYDRATE 500 MG
1 CAPSULE ORAL DAILY
Status: DISCONTINUED | OUTPATIENT
Start: 2019-08-22 | End: 2019-08-27 | Stop reason: HOSPADM

## 2019-08-22 RX ORDER — GABAPENTIN 100 MG/1
200 CAPSULE ORAL 2 TIMES DAILY
Status: DISCONTINUED | OUTPATIENT
Start: 2019-08-22 | End: 2019-08-27 | Stop reason: HOSPADM

## 2019-08-22 RX ORDER — MULTIPLE VITAMINS W/ MINERALS TAB 9MG-400MCG
1 TAB ORAL DAILY
Status: DISCONTINUED | OUTPATIENT
Start: 2019-08-22 | End: 2019-08-27 | Stop reason: HOSPADM

## 2019-08-22 RX ORDER — LIDOCAINE HYDROCHLORIDE 20 MG/ML
JELLY TOPICAL ONCE
Status: DISCONTINUED | OUTPATIENT
Start: 2019-08-22 | End: 2019-08-27 | Stop reason: HOSPADM

## 2019-08-22 RX ORDER — OXYCODONE HYDROCHLORIDE 5 MG/1
5 TABLET ORAL EVERY 4 HOURS PRN
Status: COMPLETED | OUTPATIENT
Start: 2019-08-22 | End: 2019-08-26

## 2019-08-22 RX ADMIN — OXYCODONE HYDROCHLORIDE 5 MG: 5 TABLET ORAL at 20:59

## 2019-08-22 RX ADMIN — GABAPENTIN 200 MG: 100 CAPSULE ORAL at 20:59

## 2019-08-22 RX ADMIN — ACETAMINOPHEN 650 MG: 325 TABLET, FILM COATED ORAL at 17:09

## 2019-08-22 RX ADMIN — MULTIPLE VITAMINS W/ MINERALS TAB 1 TABLET: TAB at 17:09

## 2019-08-22 RX ADMIN — SODIUM CHLORIDE: 9 INJECTION, SOLUTION INTRAVENOUS at 12:39

## 2019-08-22 RX ADMIN — MEROPENEM 1 G: 1 INJECTION, POWDER, FOR SOLUTION INTRAVENOUS at 08:29

## 2019-08-22 RX ADMIN — SERTRALINE HYDROCHLORIDE 100 MG: 100 TABLET ORAL at 17:09

## 2019-08-22 RX ADMIN — SODIUM CHLORIDE: 9 INJECTION, SOLUTION INTRAVENOUS at 01:17

## 2019-08-22 RX ADMIN — OXYCODONE HYDROCHLORIDE AND ACETAMINOPHEN 500 MG: 500 TABLET ORAL at 17:09

## 2019-08-22 RX ADMIN — VANCOMYCIN HYDROCHLORIDE 125 MG: KIT at 09:25

## 2019-08-22 RX ADMIN — VANCOMYCIN HYDROCHLORIDE 125 MG: KIT at 01:16

## 2019-08-22 RX ADMIN — VANCOMYCIN HYDROCHLORIDE 125 MG: KIT at 22:07

## 2019-08-22 RX ADMIN — Medication 1 MG: at 22:07

## 2019-08-22 RX ADMIN — OMEGA-3 FATTY ACIDS CAP 1000 MG 1 G: 1000 CAP at 17:09

## 2019-08-22 RX ADMIN — MEROPENEM 1 G: 1 INJECTION, POWDER, FOR SOLUTION INTRAVENOUS at 19:43

## 2019-08-22 ASSESSMENT — ACTIVITIES OF DAILY LIVING (ADL)
ADLS_ACUITY_SCORE: 21
ADLS_ACUITY_SCORE: 16
ADLS_ACUITY_SCORE: 18
ADLS_ACUITY_SCORE: 18

## 2019-08-22 ASSESSMENT — MIFFLIN-ST. JEOR: SCORE: 932.56

## 2019-08-22 NOTE — CONSULTS
CTS identifies pt as high risk due to Very High EMI. Patient was admitted on 8/21/19 with sepsis d/t UTI. Patient was last hospitalized from 7/28-8/5/19 for E Coli UTI and was discharged to TCU at that time. Patient was discharged from [facility] TCU on 8/16/19 to home with [facility] RN/PT/OT/SW. Patient was a no show for PCP follow up appointment on 8/20/19.   Hospital follow up appointment was not scheduled for the pt at this time as it is anticipated that patient will discharge to TCU. Previously scheduled PCP appointment on 8/23/19 was cancelled as patient will be inpatient.      No handoff will be given to PCP clinic care coordinator at discharge as no gap in care was identified.      CM will continue to follow patient for any additional discharge needs.     Isabel CAMPOVERDE  Care Transition Services  [phone]

## 2019-08-22 NOTE — PLAN OF CARE
Presentation/Diagnosis: Pt admitted on 8/21 with c/o SOB and generalized weakness  History: Sepsis secondary to UTI, AYAKA, UTI d/t ESBL, Lumbar compression fx, HTN, See full list in chart   Labs/Protocols: Na-136, K+-3.5, WBC-11.2  Vitals: Temp: 97  F (36.1  C) Temp src: Oral BP: 124/59 Pulse: 127 Heart Rate: 118 Resp: 20 SpO2: 95 % O2 Device: None (Room air)    Cardiac: Tachycardic  Telemetry: ST with ST depression   Respiratory: Lungs are clear throughout   Neuro: A&Ox3 disorientated to time   GI/: Continent of B/B-Continues on merrem Q 12hrs and vanco prophylactic due to hx of getting c-diff with ABX use-ID is following  Skin: Intact   LDAs: PIV-left forearm-Running NS 100ml/hr   Diet: Regular   Activity: SBA with walker-Lives at the [facility]-Pt is following and recommending TCU with therapy   Teaching: Pt educated on current POC   Plan: Discharge to TCU when medically stable

## 2019-08-22 NOTE — PROGRESS NOTES
[facility]    Medicine Progress Note - Hospitalist Service       Date of Admission:  8/21/2019  Assessment & Plan   Daya Ignacio is a 88 year old female admitted on 8/21/2019. She has a past medical history significant for hypertension, GERD, compression fractures, macular degeneration, C. difficile colitis, and recurrent urinary tract infections with previous ESBL organisms.  She presented to emergency room with nausea, vomiting, and weakness.  Found to have urinary tract infection with severe sepsis.     1.  Severe sepsis.  As evidenced by leukocytosis, tachycardia, initial hypotension, and evidence of endorgan damage with lactic acidosis and elevated LFTs.  Source appears to be urinary tract infection.  She was given IV fluid bolus in the emergency room of a little more than 30 cc/kg.  Started on IV meropenem to cover previous noted organisms causing UTIs.  Continue IV fluids.  Continue IV meropenem.  Infectious disease consult.  8/22.  Appreciate infectious disease input.  Continue IV meropenem.     2.  UTI.  History of ESBL producing organisms.  Add urine culture to urine sample sent from emergency room.  Continue IV meropenem.  Infectious disease consult.  8/22.  Urine culture pending.  Continue IV meropenem.       3.  History of C. difficile colitis.  Start oral vancomycin 125 mg twice a day for prophylaxis.     4.  Hypertension.  Initially hypotensive due to sepsis.  Hold antihypertensives initially.  8/22.  Blood pressure better today.  Continue to hold antihypertensives today.  Likely able to restart blood pressure medications slowly tomorrow.     5.  Hyponatremia.  Mild.  Start continuous IV fluids.  8/22.  Sodium improved to 136 today.  Stop IV fluids.     6.  Lactic acidosis.  Due to sepsis.  Lactic acid level has improved with initial fluid bolus.  Continue IV fluids at this time.  8/22.  Stop IV fluids.  Recheck lactic acid level tomorrow.       7.  Acute kidney injury.  Likely due to  sepsis.  Continue IV fluids.  Repeat metabolic panel in the morning.  Avoid nephrotoxins as able.  8/22.  Creatinine improved to 0.8.  Stop IV fluids.  Recheck metabolic panel tomorrow.    8.  Depression.    8/22.  Restart sertraline.    9.  Deconditioning.  Physical therapy consult.    Diet: Combination Diet Regular Diet Adult    DVT Prophylaxis: Pneumatic Compression Devices  Almanza Catheter: not present  Code Status: DNR/DNI      Disposition Plan   Expected discharge: 2 - 3 days  Entered: Bk Munoz DO 08/22/2019, 2:16 PM       Bk Munoz DO  Hospitalist Service  [facility]    ______________________________________________________________________    Interval History   Feels weak.  Continues to have some dysuria.  Back pain seems improved.  Denies chest pain, shortness of breath, fevers, chills, nausea, vomiting, or diarrhea.    Data reviewed today: I reviewed all medications, new labs and imaging results over the last 24 hours.    Physical Exam   Vital Signs: Temp: 97  F (36.1  C) Temp src: Oral BP: 124/59 Pulse: 127 Heart Rate: 118 Resp: 20 SpO2: 95 % O2 Device: None (Room air)    Weight: 111 lbs 15.9 oz  Gen:  NAD, A&Ox3.  Eyes:  PERRL, sclera anicteric.  OP:  MMM, no lesions.  Neck:  Supple.  CV:  Regular, no murmurs.  Lung:  CTA b/l, normal effort.  Ab:  +BS, soft.  Skin:  Warm, dry to touch.  No rash.  Ext:  No pitting edema LE b/l.      Data   Recent Labs   Lab 08/22/19  0634 08/21/19  1718   WBC 11.2* 15.5*   HGB 10.6* 11.3*   MCV 99 98   * 178    131*   POTASSIUM 3.5 3.5   CHLORIDE 105 100   CO2 24 22   BUN 32* 34*   CR 0.86 1.02   ANIONGAP 7 9   DAVE 7.8* 8.2*   GLC 87 119*   ALBUMIN  --  3.0*   PROTTOTAL  --  6.7*   BILITOTAL  --  2.2*   ALKPHOS  --  96   ALT  --  52*   AST  --  71*

## 2019-08-22 NOTE — PROGRESS NOTES
[facility]  Patient is currently open to home care services with [facility] as of 8/20/19.  The patient is currently receiving RN PT OT SW services.  [facility]  and team have been notified of patient admission.  [facility] liaison will continue to follow patient during stay.  If appropriate provide orders to resume home care at time of discharge.

## 2019-08-22 NOTE — ED NOTES
Straight cathed Patient to obtain clean urine sample. Urine collected and sent to lab. Patient tolerated well.

## 2019-08-22 NOTE — PROGRESS NOTES
Discharge Planner   Discharge Plans in progress: Yes, therapy recommending TCU. Met with patient at bedside. Patient is agreeable to TCU, requesting private room and needed for ESBL. Patient was recently admitted as inpatient 7/28-8/5/19 [facility] to provide transportation at discharge. TCU referral sent to [facility] per patient request.   Barriers to discharge plan: Medical readiness and TCU acceptance.   Follow up plan: CM will continue to follow for discharge to TCU as recommended.        Entered by: Isabel Rowland 08/22/2019 10:52 AM       Isabel CAMPOVERDE  Care Transition Services  [phone]

## 2019-08-22 NOTE — ED NOTES
IV rather positional since this RN assumed care, redressed IV site, catheter appears to be slightly bent, however with arm straight, IV pump infuses without alarming. RN will continue frequent reassessments.

## 2019-08-22 NOTE — PLAN OF CARE
VS stable. T max of 100. Tele is sinus tach. Diminished LS. Complained of pain to back and pain med's given with relief.  Slept well throughout the night.

## 2019-08-22 NOTE — H&P
[facility]    History and Physical - Hospitalist Service       Date of Admission:  8/21/2019    Assessment & Plan   Daya Ignacio is a 88 year old female admitted on 8/21/2019. She has a past medical history significant for hypertension, GERD, compression fractures, macular degeneration, C. difficile colitis, and recurrent urinary tract infections with previous ESBL organisms.  She presented to emergency room with nausea, vomiting, and weakness.  Found to have urinary tract infection with severe sepsis.    1.  Severe sepsis.  As evidenced by leukocytosis, tachycardia, initial hypotension, and evidence of endorgan damage with lactic acidosis and elevated LFTs.  Source appears to be urinary tract infection.  She was given IV fluid bolus in the emergency room of a little more than 30 cc/kg.  Started on IV meropenem to cover previous noted organisms causing UTIs.  Continue IV fluids.  Continue IV meropenem.  Infectious disease consult.    2.  UTI.  History of ESBL producing organisms.  Add urine culture to urine sample sent from emergency room.  Continue IV meropenem.  Infectious disease consult.    3.  History of C. difficile colitis.  Start oral vancomycin 125 mg twice a day for prophylaxis.    4.  Hypertension.  Initially hypotensive due to sepsis.  Hold antihypertensives initially.    5.  Hyponatremia.  Mild.  Start continuous IV fluids.    6.  Lactic acidosis.  Due to sepsis.  Lactic acid level has improved with initial fluid bolus.  Continue IV fluids at this time.    7.  Acute kidney injury.  Likely due to sepsis.  Continue IV fluids.  Repeat metabolic panel in the morning.  Avoid nephrotoxins as able.     Diet: Regular diet.  DVT Prophylaxis: Pneumatic Compression Devices  Almanza Catheter: not present  Code Status: DNR/DNI.  Patient's son at bedside confirms that this is consistent with previous discussion he has had with her.    Disposition Plan   Expected discharge: 2 - 3 days, recommended to  transitional care unit   Entered: Bk Munoz DO 08/21/2019, 9:30 PM     Bk Munoz,   [facility]    ______________________________________________________________________    Chief Complaint   Nausea, vomiting, and weakness.    History is obtained from the patient    History of Present Illness   Daya Ignacio is a 88 year old female who as a past medical history significant for hypertension, GERD, compression fractures, macular degeneration, C. difficile colitis, and recurrent urinary tract infections with previous ESBL organisms.  She has had multiple urinary tract infections over the summer.  And most recently left the hospital a few weeks ago and had transferred to a transitional care unit.  Her strength is actually been getting quite a bit better at the TCU.  She had improved to the point where she was able to discharge from the TCU to home 3 days ago.  Just prior to her discharge from TCU, she had some symptoms concerning for possible UTI.  She had had a urine culture sent at TCU.  She had been called yesterday stating that urine sample appeared to have infection.  She was started on antibiotics with Macrobid.  She did take a dose of Macrobid yesterday.  Then began having nausea.  Vomited multiple times last evening and today.  Has felt very weak since starting this medication yesterday.  Has now begun having more back pain since starting the vomiting.  Presented to emergency room for further evaluation.  She continues to feel weak at this time.  Continues to have some back pain.  Nausea is improved with medications given in the emergency room.  Does not feel that she has had a fever.  No burning with urination.  No other complaints.    Review of Systems    The 10 point Review of Systems is negative other than noted in the HPI     Past Medical History    I have reviewed this patient's medical history and updated it with pertinent information if needed.   Past Medical History:    Diagnosis Date     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 4/16/2014     Closed fracture of unspecified part of neck of femur      Closed fracture of unspecified part of vertebral column without mention of spinal cord injury     from fall summer 06     Clostridium difficile colitis 06/2019     Hypertension      Lumbar compression fracture (H)      Macular degeneration (senile) of retina, unspecified      Upper GI bleed 06/2019     Urinary tract infection due to extended-spectrum beta lactamase (ESBL)-producing Klebsiella 05/2019    resistant to Bactrim       Past Surgical History   I have reviewed this patient's surgical history and updated it with pertinent information if needed.  Past Surgical History:   Procedure Laterality Date     C NONSPECIFIC PROCEDURE      lt hip pain     CYSTOSCOPY N/A 8/1/2019    Procedure: Exam under anesthesia, video cystopanendoscopy;  Surgeon: Dennis Carlson MD;  Location:  OR     ESOPHAGOSCOPY, GASTROSCOPY, DUODENOSCOPY (EGD), COMBINED N/A 6/11/2019    Procedure: ESOPHAGOGASTRODUODENOSCOPY (EGD);  Surgeon: Gaurav Degroot MD;  Location:  GI     ORTHOPEDIC SURGERY         Social History   I have reviewed this patient's social history and updated it with pertinent information if needed.  Social History     Tobacco Use     Smoking status: Never Smoker     Smokeless tobacco: Never Used   Substance Use Topics     Alcohol use: No     Drug use: No       Family History   I have reviewed this patient's family history and updated it with pertinent information if needed.   Family History   Problem Relation Age of Onset     Alzheimer Disease Mother         mild     Cardiovascular Father         heart attack     Neurologic Disorder Brother 83        Parkinson's       Prior to Admission Medications   Prior to Admission Medications   Prescriptions Last Dose Informant Patient Reported? Taking?   Calcium Carbonate (CALCIUM 600 PO) 8/20/2019 at pm  Yes Yes   Sig: Take 1 tablet by mouth  2 times daily    Cholecalciferol (VITAMIN D3) 2000 UNITS CAPS 8/20/2019 at pm  Yes Yes   Sig: Take 2,000 Units by mouth daily (with dinner)   Lidocaine (LIDOCARE) 4 % Patch Past Month at Unknown time  Yes Yes   Sig: Apply to low back topically one time a day for pain and remove per schedule as needed.   Multiple Vitamins-Minerals (PRESERVISION AREDS) CAPS 8/20/2019 at pm  Yes Yes   Sig: Take 1 capsule by mouth 2 times daily    amLODIPine (NORVASC) 10 MG tablet 8/20/2019 at am  No Yes   Sig: Take 1 tablet (10 mg) by mouth daily   cloNIDine (CATAPRES) 0.1 MG tablet 8/20/2019 at pm  No Yes   Sig: TAKE ONE TABLET BY MOUTH TWO TIMES A DAY   estradiol (ESTRACE) 0.1 MG/GM vaginal cream 8/19/2019  No No   Sig: Place 2 g vaginally three times a week paraben-free   fish oil-omega-3 fatty acids 1000 MG capsule Past Month at Unknown time  Yes Yes   Sig: Take 1 g by mouth daily   gabapentin (NEURONTIN) 100 MG capsule 8/20/2019 at pm  No Yes   Sig: Take 2 capsules (200 mg) by mouth 2 times daily   ibuprofen (ADVIL/MOTRIN) 200 MG tablet Past Month at Unknown time  Yes Yes   Sig: Take 200 mg by mouth every 4 hours as needed for mild pain   isosorbide dinitrate (ISORDIL) 10 MG tablet 8/20/2019 at pm  No Yes   Sig: Take 1 tablet (10 mg) by mouth 3 times daily   loperamide (IMODIUM) 2 MG capsule Past Week at Unknown time  No Yes   Sig: Take 1 capsule (2 mg) by mouth 4 times daily as needed for diarrhea   metoprolol succinate ER (TOPROL-XL) 100 MG 24 hr tablet 8/20/2019 at am  No Yes   Sig: Take 1 tablet (100 mg) by mouth daily   sertraline (ZOLOFT) 100 MG tablet 8/20/2019 at am  No Yes   Sig: Take 1 tablet (100 mg) by mouth daily   vitamin C (ASCORBIC ACID) 500 MG tablet 8/20/2019 at am  Yes Yes   Sig: Take 500 mg by mouth daily      Facility-Administered Medications: None     Allergies   Allergies   Allergen Reactions     Atorvastatin Muscle Pain (Myalgia)     Macrobid [Nitrofurantoin Anhydrous] Other (See Comments)     Body aches      Sulfa Drugs      Tolerated Bactrim May 2019       Physical Exam   Vital Signs: Temp: 99  F (37.2  C)   BP: 122/49 Pulse: 96 Heart Rate: 88 Resp: 26 SpO2: 97 % O2 Device: None (Room air)    Weight: 111 lbs 15.9 oz    Gen:  NAD, A&Ox3.  Eyes:  PERRL, sclera anicteric.  OP:  MMM, no lesions.  Neck:  Supple.  CV:  Regular, no murmurs.  Lung:  CTA b/l, normal effort.  Ab:  +BS, soft.  Skin:  Warm, dry to touch.  No rash.  Ext:  No pitting edema LE b/l.      Data   Data reviewed today: I reviewed all medications, new labs and imaging results over the last 24 hours.     Recent Labs   Lab 08/21/19  1718 08/15/19   WBC 15.5* 4.2   HGB 11.3* 8.6*   MCV 98 96.0    184   * 139   POTASSIUM 3.5 3.6   CHLORIDE 100 104   CO2 22 27   BUN 34* 16   CR 1.02 0.64   ANIONGAP 9 8   DAVE 8.2* 9.6   * 91   ALBUMIN 3.0*  --    PROTTOTAL 6.7*  --    BILITOTAL 2.2*  --    ALKPHOS 96  --    ALT 52*  --    AST 71*  --

## 2019-08-22 NOTE — PROGRESS NOTES
08/22/19 0650   Significant Event   Significant Event Other (see comments)  (Lactic 2.7)   Above lab result verbally reported to bedside RN, Kathy Case, by this RN.  Idania Weiss, BSN, RN  Medical/Telemetry - 3

## 2019-08-22 NOTE — ED NOTES
Pt up to commode with assist of 1, slightly unsteady at times, pt winded after using commode, however sats >95% on room air and no wheezing upon auscultation. MD aware.

## 2019-08-22 NOTE — ED NOTES
[facility]  ED Nurse Handoff Report    Daya Ignacio is a 88 year old female   ED Chief complaint: Shortness of Breath and Generalized Weakness  . ED Diagnosis:   Final diagnoses:   None     Allergies:   Allergies   Allergen Reactions     Atorvastatin Muscle Pain (Myalgia)     Macrobid [Nitrofurantoin Anhydrous] Other (See Comments)     Body aches     Sulfa Drugs      Tolerated Bactrim May 2019       Code Status: Full Code  Activity level - Baseline/Home:  Stand by Assist. Activity Level - Current:   Assist X 2. Lift room needed: No. Bariatric: No   Needed: No   Isolation: No. Infection: History of ESBL    Vital Signs:   Vitals:    08/21/19 1713 08/21/19 1730 08/21/19 1800   BP: (!) 88/46 110/54 105/50   Pulse: 102 92 90   Resp: 18 (!) 35 26   Temp: 99  F (37.2  C)     SpO2: 99% 97% 95%       Cardiac Rhythm:  ,      Pain level:    Patient confused: No. Patient Falls Risk: Yes.   Elimination Status: able to void   Patient Report - Initial Complaint: Shortness of breath and generalized weakness. Focused Assessment:Patient presents with complaints of SOB and weakness which started this afternoon . Patient states she is also having lower back pain as well.  Patient has history of UTIs.     Tests Performed: labs, imaging. Abnormal Results:   Labs Ordered and Resulted from Time of ED Arrival Up to the Time of Departure from the ED   COMPREHENSIVE METABOLIC PANEL - Abnormal; Notable for the following components:       Result Value    Sodium 131 (*)     Glucose 119 (*)     Urea Nitrogen 34 (*)     GFR Estimate 49 (*)     GFR Estimate If Black 57 (*)     Calcium 8.2 (*)     Bilirubin Total 2.2 (*)     Albumin 3.0 (*)     Protein Total 6.7 (*)     ALT 52 (*)     AST 71 (*)     All other components within normal limits   CBC WITH PLATELETS DIFFERENTIAL - Abnormal; Notable for the following components:    WBC 15.5 (*)     RBC Count 3.58 (*)     Hemoglobin 11.3 (*)     RDW 17.2 (*)     Absolute  Neutrophil 14.4 (*)     Absolute Lymphocytes 0.5 (*)     All other components within normal limits   ISTAT  GASES LACTATE THERESA POCT - Abnormal; Notable for the following components:    PO2 Venous 18 (*)     Lactic Acid 4.7 (*)     All other components within normal limits   ROUTINE UA WITH MICROSCOPIC   PERIPHERAL IV CATHETER   CARDIAC CONTINUOUS MONITORING   NURSING DRAW AND HOLD   NURSING DRAW AND HOLD   NURSING DRAW AND HOLD   ISTAT CG4 GASES LACTATE THERESA NURSING POCT       Treatments provided: monitoring, comfort measures.  Family Comments: family at beside  OBS brochure/video discussed/provided to patient:  N/A  ED Medications:   Medications   0.9% sodium chloride BOLUS (has no administration in time range)   0.9% sodium chloride BOLUS (1,000 mLs Intravenous New Bag 8/21/19 5517)     Drips infusing:  Yes  For the majority of the shift, the patient's behavior Green. Interventions performed were n/a   Severe Sepsis OR Septic Shock Diagnosis Present: n/a      ED Nurse Name/Phone Number: Alize Son RN,   7:09 PM    RECEIVING UNIT ED HANDOFF REVIEW    Above ED Nurse Handoff Report was reviewed: Yes  Reviewed by: Colby Jimenez RN on August 21, 2019 at 9:36 PM

## 2019-08-22 NOTE — PHARMACY-ADMISSION MEDICATION HISTORY
Admission medication history interview status for this patient is complete. See [facility] admission navigator for allergy information, prior to admission medications and immunization status.     Medication history interview source(s):Patient and Family  Medication history resources (including written lists, pill bottles, clinic record): SureScripts and medication list supplied by son  [place] [facility] [place] [address]    Changes made to PTA medication list:  Added: Ibuprofen  Deleted: ondansetron, acetaminophen, tamsulosin  Changed: None    Actions taken by pharmacist (provider contacted, etc):None     Additional medication history information:None    Medication reconciliation/reorder completed by provider prior to medication history? No    Do you take OTC medications (eg tylenol, ibuprofen, fish oil, eye/ear drops, etc)? Yes, see list.    Prior to Admission medications    Medication Sig Last Dose Taking? Auth Provider   amLODIPine (NORVASC) 10 MG tablet Take 1 tablet (10 mg) by mouth daily 8/20/2019 at am Yes Marcy Soares PA-C   Calcium Carbonate (CALCIUM 600 PO) Take 1 tablet by mouth 2 times daily  8/20/2019 at pm Yes Reported, Patient   Cholecalciferol (VITAMIN D3) 2000 UNITS CAPS Take 2,000 Units by mouth daily (with dinner) 8/20/2019 at pm Yes Unknown, Entered By History   cloNIDine (CATAPRES) 0.1 MG tablet TAKE ONE TABLET BY MOUTH TWO TIMES A DAY 8/20/2019 at pm Yes Marcy Soares PA-C   fish oil-omega-3 fatty acids 1000 MG capsule Take 1 g by mouth daily Past Month at Unknown time Yes Unknown, Entered By History   gabapentin (NEURONTIN) 100 MG capsule Take 2 capsules (200 mg) by mouth 2 times daily 8/20/2019 at pm Yes Amanda Kenyon MD   ibuprofen (ADVIL/MOTRIN) 200 MG tablet Take 200 mg by mouth every 4 hours as needed for mild pain Past Month at Unknown time Yes Unknown, Entered By History   isosorbide dinitrate (ISORDIL) 10 MG tablet Take 1 tablet  (10 mg) by mouth 3 times daily 8/20/2019 at pm Yes Marcy Soares PA-C   Lidocaine (LIDOCARE) 4 % Patch Apply to low back topically one time a day for pain and remove per schedule as needed. Past Month at Unknown time Yes Reported, Patient   loperamide (IMODIUM) 2 MG capsule Take 1 capsule (2 mg) by mouth 4 times daily as needed for diarrhea Past Week at Unknown time Yes Amanda Kenyon MD   metoprolol succinate ER (TOPROL-XL) 100 MG 24 hr tablet Take 1 tablet (100 mg) by mouth daily 8/20/2019 at am Yes Marcy Soares PA-C   Multiple Vitamins-Minerals (PRESERVISION AREDS) CAPS Take 1 capsule by mouth 2 times daily  8/20/2019 at pm Yes Unknown, Entered By History   sertraline (ZOLOFT) 100 MG tablet Take 1 tablet (100 mg) by mouth daily 8/20/2019 at am Yes Marcy Soares PA-C   vitamin C (ASCORBIC ACID) 500 MG tablet Take 500 mg by mouth daily 8/20/2019 at am Yes Reported, Patient   estradiol (ESTRACE) 0.1 MG/GM vaginal cream Place 2 g vaginally three times a week paraben-free 8/19/2019  Marcy Soares PA-C

## 2019-08-22 NOTE — PROGRESS NOTES
08/22/19 1013   Quick Adds   Type of Visit Initial PT Evaluation   Living Environment   Lives With alone   Living Environment Comment Pt lives alone in IND living. Has had 8 hospital admissions in the last 6 months. Recently admitted early August, DC'd to TCU, has been home for ~1.5 weeks since DCing from U. Has walk in shower, shower bench, grab bars. Elevator access    Self-Care   Usual Activity Tolerance good   Current Activity Tolerance fair   Activity/Exercise/Self-Care Comment Pt has been receving HH therapy. Pt is IND with self cares at baseline   Functional Level Prior   Ambulation 0-->independent   Transferring 0-->independent   Toileting 0-->independent   Bathing 0-->independent   Communication 0-->understands/communicates without difficulty   Prior Functional Level Comment Pt typically IND with mobility at baseline, since she has been ill pt has been using FWW.    General Information   Onset of Illness/Injury or Date of Surgery - Date 08/21/19   Referring Physician Bk Munoz,    Pertinent History of Current Problem (include personal factors and/or comorbidities that impact the POC) Daya Ignacio is a 88 year old female admitted on 8/21/2019. She has a past medical history significant for hypertension, GERD, compression fractures, macular degeneration, C. difficile colitis, and recurrent urinary tract infections with previous ESBL organisms.  She presented to emergency room with nausea, vomiting, and weakness.  Found to have urinary tract infection with severe sepsis.   Precautions/Limitations fall precautions   General Observations HR tachy at rest, up to 111 bpm with activity    Cognitive Status Examination   Orientation orientation to person, place and time   Pain Assessment   Patient Currently in Pain No   Posture    Posture Forward head position   Range of Motion (ROM)   ROM Comment BLE WFL   Strength   Strength Comments impaired functional strength noted, pt heavily relies on UE  "push of for transfers, braces LE against bed    Bed Mobility   Bed Mobility Comments SBA Supine > sitting EOB    Transfer Skills   Transfer Comments STS wtih FWw and CgA from EOB    Gait   Gait Comments Pt ambulated short distance in room, ~30' with FWw, CgA, fwd head posture, unsafe proximity to walker with turns, inc GALLEGOS, VSS on RA    Balance   Balance Comments good static standing balance with FWW, fair dynamic balance with FWW    Sensory Examination   Sensory Perception Comments intact to light touch    General Therapy Interventions   Planned Therapy Interventions balance training;bed mobility training;gait training;neuromuscular re-education;strengthening;transfer training   Clinical Impression   Criteria for Skilled Therapeutic Intervention yes, treatment indicated   PT Diagnosis impaired IND with functional mobility from baseline    Influenced by the following impairments weakness, impaired activity tolerance, balance   Functional limitations due to impairments Impaired bed mobility, transfers, gait    Clinical Presentation Evolving/Changing   Clinical Presentation Rationale frequent hospital admissions, co morbidities, clinical judgement    Clinical Decision Making (Complexity) Moderate complexity   Therapy Frequency Daily   Predicted Duration of Therapy Intervention (days/wks) 1 week   Anticipated Discharge Disposition Transitional Care Facility   Risk & Benefits of therapy have been explained Yes   Patient, Family & other staff in agreement with plan of care Yes   [facility] AM-PAC  \"6 Clicks\" V.2 Basic Mobility Inpatient Short Form   1. Turning from your back to your side while in a flat bed without using bedrails? 4 - None   2. Moving from lying on your back to sitting on the side of a flat bed without using bedrails? 3 - A Little   3. Moving to and from a bed to a chair (including a wheelchair)? 3 - A Little   4. Standing up from a chair using your arms (e.g., wheelchair, or bedside chair)? 3 - A " Little   5. To walk in hospital room? 3 - A Little   6. Climbing 3-5 steps with a railing? 2 - A Lot   Basic Mobility Raw Score (Score out of 24.Lower scores equate to lower levels of function) 18   Total Evaluation Time   Total Evaluation Time (Minutes) 15

## 2019-08-22 NOTE — PLAN OF CARE
Hx: Pt was admitted to  today (08/21) for SOB and generalized weakness. Pt is being treated for sepsis 2/2 UTI. Pt has a Hx of ESBL in urine. PMH includes C. Diff, HTN, hyponatremia, AYAKA, osteoporosis, NSTEMI Hx, Chronic lumbar pain.  Orientation: A&OX4, Ambulation: 2X c/ GB and pivot  V/S: HR sustained in the 120's. Otherwise VSS, Pt is on RA. , Pain: 9/10 in lower back. Waiting on pharmacy approval of Rx. Addendum: Pt is currently sleeping during rounds. Non-verbal indicators show an absence of pain.  Tele/CV: Tachycardia  LS: Clear, equal bilaterally.  : Pt has not voided since arrival to [facility]. Pt was straight cath in ER.  GI: Active & Audible X4. LBM was 08/20. Pt denes constipation and diarrhea  IV: SL.  Plan: Continue to monitor.  Other: Duel skin assessment not completed during shift.

## 2019-08-22 NOTE — PLAN OF CARE
Discharge Planner PT   Patient plan for discharge: home vs rehab  Current status:     Eval complete, treatment indicated. Edu PT role, POC. Pt tachy at rest,  bpm with activity. O2 on RA > 92% throughout session. Inc encouragement to participate. Pt educated on importance of OOB mobility, educated on risk of further deconditioning and weakness while in hospital if not ambulating and mobilizing OOB. Pt cooperative and pleasant.     Pt engaged in supine and seated exercises, provided with HEP handout       Supine > sitting EOB with HOB elevated, minor use of bed rail. SBA provided. STS with FWw and CGA. Slow speed. heavy use of UE push off.       Pt ambulated short distance in room, ~30' with FWw, CgA, fwd head posture, unsafe proximity to walker with turns, inc GALLEGOS, VSS on RA       End of session sit > supine with SBA. Pt left supine with needs in reach. Educated on discontinuing use of BSC as pt can ambualte into BR. Pt to be up to chair for meals. pt verbalized understanding     Barriers to return to prior living situation: Ax 1, lives alone, impaired activity tolerance, weakness, fall risk     Recommendations for discharge: TCu    Rationale for recommendations: Pt will benefit from continued skilled PT at TCU to improve strength, balance, gait, endurance to improve functional mobility prior to return home          Entered by: Maddi Duke 08/22/2019 10:26 AM

## 2019-08-22 NOTE — PROGRESS NOTES
Discharge Planner   Discharge Plans in progress: Yes.   Patient is agreeable to TCU, requesting private room and needed for ESBL. Patient has been accepted to [facility] in [place] with bed available on Saturday 8/24/19.   Son Gael to provide transportation at discharge  Barriers to discharge plan: Medical readiness.   Follow up plan: CM will continue to follow for discharge to TCU.        Entered by: Isabel Rowland 08/22/2019 3:14 PM       Isabel Rowland MA-RN  Care Transition Services  [phone]

## 2019-08-22 NOTE — TELEPHONE ENCOUNTER
Please sign home care orders and fax back to [phone]    Placed in forms folder for [facility] at [facility]

## 2019-08-22 NOTE — CONSULTS
ID consult dictated IMP 1 87 yo female recurrent UTI, recent ESBl, all this complicated by C diff, some sxs not seemingly UTI    REC await current cx, vanco po and ladi

## 2019-08-22 NOTE — PROGRESS NOTES
Infection Prevention:    Patient requires Contact precautions because of ESBL: urine, 2019. Please contact Infection Prevention with any questions/concerns at *[phone].    Annette Fatima, ICP

## 2019-08-22 NOTE — CONSULTS
Consult Date:  08/22/2019      INFECTIOUS DISEASE CONSULTATION       LOCATION:  Room 348 [facility]      REQUESTING PHYSICIAN:  Ricardo Munoz DO.       IMPRESSION:   1.  An 88-year-old female, known to us from prior admission, currently admitted with increasing vaginal and urinary symptoms, abnormal urinalysis, some minimal systemic symptoms, presumed UTI, although somewhat complicated.   2.  History of recurrent UTIs including recent extended spectrum beta lactamase organism that has been treated.   3.  Prior Clostridium difficile colitis.   4.  Some urinary and vaginal symptoms, recurrent without a clear underlying infection etiology, recent cystoscopy negative.   5.  History of coronary artery disease.   6.  SULFA AND MACRODANTIN LISTED ALLERGIES.      RECOMMENDATIONS:   1.  For now, meropenem and oral vancomycin prophylaxis.   2.  Await current cultures and adjust.   3.  If she grows ESBL again on this occasion, probably treat, although it is not completely clear that that is driving all or most of these symptoms.      HISTORY OF PRESENT ILLNESS:  This 88-year-old female is seen in consultation due to apparent recurrent UTI.  The patient is known to us previously, we saw her when she had a failed treatment of a UTI which turned out to be an ESBL organism.  She had had SULFA given for that previously, but it ended up becoming resistant.  We treated the episode with a short course of meropenem and ertapenem and she clinically improved.  Subsequent to that, she has had further abnormal urinalysis and cultures have grown an ESBL.  She then was hospitalized in July, was having symptoms, but urine was unremarkable, was not clear that she had a true UTI.  She then more recently had further increasing symptoms again and was readmitted.  Not having major fevers, chills, but has had some low-grade fever, some slight sense of not feeling well and ongoing urinary symptoms.      PAST MEDICAL HISTORY:   Dominated by the recent UTIs, prior history of C. diff colitis, history of mild renal insufficiency, prior history of depression.      SOCIAL AND FAMILY HISTORY:  No recent travels or exposures, has the known ESBL as above.  No family history otherwise of note.      ALLERGIES:  MACRODANTIN LISTED ALLERGY AND SULFA.  She had previously gotten SULFA for this infection, and the ESBL was sensitive but has now evolved resistance.      MEDICATIONS:  As listed.      REVIEW OF SYSTEMS:  Ongoing vaginal symptoms, urgency and frequent urination, not really having burning.  Some mild systemic symptoms, slight feverish feelings, does not feel well.      PHYSICAL EXAMINATION:   GENERAL:  The patient appears her stated age.  She is a good historian.   VITAL SIGNS:  Currently normal.  Temperature is 100, pulse of 90, respiratory rate 16 and unlabored.   HEENT:  No thrush or intraoral lesions.  Pupils reactive.   NECK:  Supple and nontender, no meningismus.   HEART:  Regular rhythm, no major murmur.   LUNGS:  Clear bilaterally.   ABDOMEN:  Mild tenderness in the suprapubic area, maybe a slight bit of right flank tenderness.   EXTREMITIES:  No rash or skin lesions.      LABORATORY DATA:  Lactic acid elevated at 4.7.  Liver function tests mildly abnormal.        Cultures are all pending.        White blood cell count 15,500 with a left shift.        Urinalysis grossly abnormal, 113 white cells, large amount of leukocyte esterase.  Culture pending.      Thank you very much for the consultation.  I will follow the patient with you.         MINI CABELLO MD             D: 2019   T: 2019   MT: WT      Name:     MAGALY MCDANIELS   MRN:      [id number]        Account:       [id number]   :      10/11/1930           Consult Date:  2019      Document: [id number]       cc: Marcy Soares PA-C

## 2019-08-22 NOTE — TELEPHONE ENCOUNTER
Below was entered in hospital follow-up encounter.  Did go to ER and was admitted to hospital.  Beba Duke RN    Me           8/21/19 3:59 PM   Note      [facility] calling back.  Lots of lower back pain.  Back pain rated at a 9 out of 10.   States feels vomiting from Macorbid due to ended up taking 2 doses yesterday afternoon/evening on empty stomach.  Luma concerned with new onset shortness of breath today.  Was not having shortness of breath when she saw her yesterday.  BP- 104/48 P-98 R- 30 Ox- 93%.  Yesterday Ox- 96%.  Lungs clear.  No swelling.  Does not weigh herself.  Shortness of breath started today.  No further vomiting.       Discussed with Marcy.  Due to level of pain, new onset shortness of breath, slight decrease in sat Marcy recommends ER to evaluate further.  Luma states son and patient are in agreement and aware that would likely be recommendation.  Luma will have them go to [facility] ER now.  Beba Duke RN

## 2019-08-23 LAB
BACTERIA SPEC CULT: NO GROWTH
LACTATE BLD-SCNC: 0.7 MMOL/L (ref 0.7–2)
Lab: NORMAL
PROCALCITONIN SERPL-MCNC: 7 NG/ML
SPECIMEN SOURCE: NORMAL

## 2019-08-23 PROCEDURE — 99233 SBSQ HOSP IP/OBS HIGH 50: CPT | Performed by: INTERNAL MEDICINE

## 2019-08-23 PROCEDURE — 12000000 ZZH R&B MED SURG/OB

## 2019-08-23 PROCEDURE — 25000128 H RX IP 250 OP 636: Performed by: INTERNAL MEDICINE

## 2019-08-23 PROCEDURE — 83605 ASSAY OF LACTIC ACID: CPT | Performed by: INTERNAL MEDICINE

## 2019-08-23 PROCEDURE — 36415 COLL VENOUS BLD VENIPUNCTURE: CPT | Performed by: INTERNAL MEDICINE

## 2019-08-23 PROCEDURE — 25000132 ZZH RX MED GY IP 250 OP 250 PS 637: Mod: GY | Performed by: INTERNAL MEDICINE

## 2019-08-23 PROCEDURE — 84145 PROCALCITONIN (PCT): CPT | Performed by: INTERNAL MEDICINE

## 2019-08-23 RX ORDER — AMLODIPINE BESYLATE 10 MG/1
10 TABLET ORAL DAILY
Status: DISCONTINUED | OUTPATIENT
Start: 2019-08-23 | End: 2019-08-27 | Stop reason: HOSPADM

## 2019-08-23 RX ORDER — CLONIDINE HYDROCHLORIDE 0.1 MG/1
0.1 TABLET ORAL 2 TIMES DAILY
Status: DISCONTINUED | OUTPATIENT
Start: 2019-08-23 | End: 2019-08-25

## 2019-08-23 RX ORDER — HYDRALAZINE HYDROCHLORIDE 20 MG/ML
5 INJECTION INTRAMUSCULAR; INTRAVENOUS EVERY 4 HOURS PRN
Status: DISCONTINUED | OUTPATIENT
Start: 2019-08-23 | End: 2019-08-27 | Stop reason: HOSPADM

## 2019-08-23 RX ORDER — METOPROLOL SUCCINATE 100 MG/1
100 TABLET, EXTENDED RELEASE ORAL DAILY
Status: DISCONTINUED | OUTPATIENT
Start: 2019-08-23 | End: 2019-08-27 | Stop reason: HOSPADM

## 2019-08-23 RX ADMIN — HYDRALAZINE HYDROCHLORIDE 5 MG: 20 INJECTION INTRAMUSCULAR; INTRAVENOUS at 12:50

## 2019-08-23 RX ADMIN — MULTIPLE VITAMINS W/ MINERALS TAB 1 TABLET: TAB at 08:12

## 2019-08-23 RX ADMIN — OMEGA-3 FATTY ACIDS CAP 1000 MG 1 G: 1000 CAP at 08:12

## 2019-08-23 RX ADMIN — OXYCODONE HYDROCHLORIDE AND ACETAMINOPHEN 500 MG: 500 TABLET ORAL at 08:12

## 2019-08-23 RX ADMIN — Medication 1 MG: at 20:17

## 2019-08-23 RX ADMIN — METOPROLOL SUCCINATE 100 MG: 100 TABLET, EXTENDED RELEASE ORAL at 10:17

## 2019-08-23 RX ADMIN — VANCOMYCIN HYDROCHLORIDE 125 MG: KIT at 08:53

## 2019-08-23 RX ADMIN — MEROPENEM 1 G: 1 INJECTION, POWDER, FOR SOLUTION INTRAVENOUS at 08:12

## 2019-08-23 RX ADMIN — GABAPENTIN 200 MG: 100 CAPSULE ORAL at 08:12

## 2019-08-23 RX ADMIN — AMLODIPINE BESYLATE 10 MG: 10 TABLET ORAL at 10:17

## 2019-08-23 RX ADMIN — ACETAMINOPHEN 650 MG: 325 TABLET, FILM COATED ORAL at 00:30

## 2019-08-23 RX ADMIN — CLONIDINE HYDROCHLORIDE 0.1 MG: 0.1 TABLET ORAL at 15:48

## 2019-08-23 RX ADMIN — SERTRALINE HYDROCHLORIDE 100 MG: 100 TABLET ORAL at 08:12

## 2019-08-23 RX ADMIN — CLONIDINE HYDROCHLORIDE 0.1 MG: 0.1 TABLET ORAL at 20:17

## 2019-08-23 RX ADMIN — GABAPENTIN 200 MG: 100 CAPSULE ORAL at 20:17

## 2019-08-23 RX ADMIN — VANCOMYCIN HYDROCHLORIDE 125 MG: KIT at 20:17

## 2019-08-23 ASSESSMENT — ACTIVITIES OF DAILY LIVING (ADL)
ADLS_ACUITY_SCORE: 15
ADLS_ACUITY_SCORE: 16
ADLS_ACUITY_SCORE: 13
ADLS_ACUITY_SCORE: 13
ADLS_ACUITY_SCORE: 12
ADLS_ACUITY_SCORE: 16

## 2019-08-23 NOTE — PLAN OF CARE
Hx: Pt was admitted on 08/21 for SOB and generalized weakness. Pt is being treated for sepsis 2/2 UTI. Pt has a Hx of ESBL in urine. PMH includes C. Diff, HTN, hyponatremia, AYAKA, osteoporosis, NSTEMI Hx, Chronic lumbar pain.  Orientation: A&OX4, Ambulation: 1X c/ GB and walker. V/S: VSS, Pt is on RA. , Pain: 9/10 vaginal pain, oxycodone administered (See  section). Tele/CV: SR borderline Sinus tachycardia.  LS: Clear, equal bilaterally.  : Pt was unable to void. Bladder scan at 421 mL. Pt was straight catheterized. UOP was 600 post straight cath. PVR at 0 mL.  GI: Active & Audible X4. Pt had a BM during shift.  IV: SL, Flushes well. IV ABX administered per protocol.  Plan: Excepct hospitalization for 2 - 3 days. Possible discharge to TCU.  Other: Skin intact.

## 2019-08-23 NOTE — PLAN OF CARE
B/p elevated 199/86 after getting oob walking to br and to chair. Recheck after resting 182/82 home b/p meds restarted amlodipine and metoprolol without much change. Recheck 188/76 gave 5mg hydralazine which brought b/p to 173/71. Dr. Dos Santos was paged with these levels. Clonidine was order per pta meds. Ambulating well in room and medley with staff and walker. Up to chair with meals. Good appetite. Voiding without dif. Tele sr.

## 2019-08-23 NOTE — PLAN OF CARE
PT-attempted to see, busy making phone calls and per report from RN walked with them to greater than 200 feet with RW.

## 2019-08-23 NOTE — PROVIDER NOTIFICATION
"Text Page MD: \"Can we get an order for straight cath and pain medication. Pt is having 9/10 vaginal pain. Pt was bladder scanned at 421 ml and is unable to void. Thanks.\"  "

## 2019-08-23 NOTE — PROGRESS NOTES
"[facility]  Infectious Disease Progress Note          Assessment and Plan:   IMPRESSION:   1.  An 88-year-old female, known to us from prior admission, currently admitted with increasing vaginal and urinary symptoms, abnormal urinalysis, some minimal systemic symptoms, presumed UTI, although somewhat complicated.   2.  History of recurrent UTIs including recent extended spectrum beta lactamase organism that has been treated.   3.  Prior Clostridium difficile colitis.   4.  Some urinary and vaginal symptoms, recurrent without a clear underlying infection etiology, recent cystoscopy negative.   5.  History of coronary artery disease.   6.  SULFA AND MACRODANTIN LISTED ALLERGIES.      RECOMMENDATIONS:   1. Continueoral vancomycin prophylaxis.   2.  Initial LGF, urinary sxs, abnormal UA, lactic acidosis, so logically UTI BUT cxs all neg T down and on ladi same urinary sxs  3.  Given neg cxs , C diff risk, I favor stop ABX despite initial labs suggestive bacterial/UTI. tx sxs and see how does             Interval History:   no new complaints and doing well; no fever same vag/urinary sxs no fever sxs mildly weak  cxs neg              Medications:       amLODIPine  10 mg Oral Daily     fish oil-omega-3 fatty acids  1 g Oral Daily     gabapentin  200 mg Oral BID     lidocaine   Urethral Once     metoprolol succinate ER  100 mg Oral Daily     multivitamin w/minerals  1 tablet Oral Daily     sertraline  100 mg Oral Daily     sodium chloride (PF)  3 mL Intracatheter Q8H     vancomycin  125 mg Oral BID     vitamin C  500 mg Oral Daily                  Physical Exam:   Blood pressure (!) 182/82, pulse 127, temperature 95.7  F (35.4  C), temperature source Oral, resp. rate 20, height 1.549 m (5' 1\"), weight 56.5 kg (124 lb 9.6 oz), SpO2 100 %, not currently breastfeeding.  Wt Readings from Last 2 Encounters:   08/22/19 56.5 kg (124 lb 9.6 oz)   08/15/19 54.1 kg (119 lb 3.2 oz)     Vital Signs with " Ranges  Temp:  [95.7  F (35.4  C)-97.5  F (36.4  C)] 95.7  F (35.4  C)  Heart Rate:  [82-98] 92  Resp:  [20] 20  BP: (134-199)/(61-93) 182/82  SpO2:  [95 %-100 %] 100 %    Constitutional: Awake, alert, cooperative, no apparent distress   Lungs: Clear to auscultation bilaterally, no crackles or wheezing   Cardiovascular: Regular rate and rhythm, normal S1 and S2, and no murmur noted   Abdomen: Normal bowel sounds, soft, non-distended, non-tender   Skin: No rashes, no cyanosis, no edema   Other:           Data:   All microbiology laboratory data reviewed.  Recent Labs   Lab Test 08/22/19  0634 08/21/19  1718 08/15/19   WBC 11.2* 15.5* 4.2   HGB 10.6* 11.3* 8.6*   HCT 33.1* 35.0 26.6*   MCV 99 98 96.0   * 178 184     Recent Labs   Lab Test 08/22/19  0634 08/21/19  1718 08/15/19   CR 0.86 1.02 0.64     No lab results found.  Recent Labs   Lab Test 08/21/19  2221 08/21/19  1953 07/28/19  0841 07/28/19  0833 07/28/19  0801 07/22/19  1158 07/15/19  0918 07/13/19  1405 07/11/19  0640   CULT No growth after 1 day  No growth after 1 day No growth No growth No growth No growth >100,000 colonies/mL  Escherichia coli ESBL  *  ESBL (extended beta lactamase) producing organisms require contact precautions. No growth No growth No growth

## 2019-08-23 NOTE — PROGRESS NOTES
[facility]    Medicine Progress Note - Hospitalist Service       Date of Admission:  8/21/2019  Assessment & Plan   Daya Ignacio is a 88 year old female admitted on 8/21/2019. She has a past medical history significant for hypertension, GERD, compression fractures, macular degeneration, C. difficile colitis, and recurrent urinary tract infections with previous ESBL organisms.  She presented to emergency room with nausea, vomiting, and weakness.  Found to have urinary tract infection with severe sepsis.     1.  Severe sepsis.  As evidenced by leukocytosis, tachycardia, initial hypotension, and evidence of endorgan damage with lactic acidosis and elevated LFTs.  Source appears to be urinary tract infection.  She was given IV fluid bolus in the emergency room of a little more than 30 cc/kg.  Started on IV meropenem to cover previous noted organisms causing UTIs.  Continue IV fluids.  Continue IV meropenem.  Infectious disease consult.  8/22.  Appreciate infectious disease input.  Continue IV meropenem.  8/23.  Urine and blood cultures with no growth to date.  Per ID will discontinue meropenem    2.  UTI.  History of ESBL producing organisms.  Add urine culture to urine sample sent from emergency room.    Continue IV meropenem.  Infectious disease consult.  8/22.  Urine culture pending.  Continue IV meropenem.  8/23 cultures remain negative.  ID consult appreciated will discontinue IV meropenem per ID       3.  History of C. difficile colitis.  Start oral vancomycin 125 mg twice a day for prophylaxis.  8/23 continue on oral vancomycin     4.  Hypertension.  Initially hypotensive due to sepsis.  Hold antihypertensives initially.  8/22.  Blood pressure better today.  Continue to hold antihypertensives today.  Likely able to restart blood pressure medications slowly  8/23 blood pressure elevated today reorder prior to admission amlodipine and metoprolol.  --Patient also on clonidine 0.1 twice daily will  hold for now will resume if blood pressure allows     5.  Hyponatremia.  Mild.  Start continuous IV fluids.:  Resolved  8/22.  Sodium improved to 136 today.  Stop IV fluids.     6.  Lactic acidosis.  Due to sepsis.  Lactic acid level has improved with initial fluid bolus.  Resolved.  8/22.  Stop IV fluids.  Recheck lactic acid level tomorrow.  8/23: Lactic acid 0.7       7.  Acute kidney injury.  Likely due to sepsis.  Continue IV fluids.  Repeat metabolic panel in the morning.  Avoid nephrotoxins as able.  8/22.  Creatinine improved to 0.8.  Stop IV fluids.   8/23  Recheck metabolic panel       8.  Depression.    8/22.  Restart sertraline.    9.  Deconditioning.  Physical therapy consult.    Diet: Combination Diet Regular Diet Adult    DVT Prophylaxis: Pneumatic Compression Devices  Almanza Catheter: not present  Code Status: DNR/DNI      Disposition Plan   Expected discharge: 2 - 3 days  Entered: Fany Dos Santos MD 08/23/2019, 9:26 AM       Fany Dos Santos MD  Hospitalist Service  [facility]    ______________________________________________________________________    Interval History   Assumed care reviewed chart.  Feels weak.  Continues to have some dysuria.  Back pain seems improved.  Denies chest pain, shortness of breath, fevers, chills, nausea, vomiting, or diarrhea.    Data reviewed today: I reviewed all medications, new labs and imaging results over the last 24 hours.    Physical Exam   Vital Signs: Temp: 95.7  F (35.4  C) Temp src: Oral BP: (!) 182/82   Heart Rate: 92 Resp: 20 SpO2: 100 % O2 Device: None (Room air)    Weight: 124 lbs 9.6 oz  Gen:  NAD, A&Ox3.  Eyes:  PERRL, sclera anicteric.  OP:  MMM, no lesions.  Neck:  Supple.  CV:  Regular, no murmurs.  Lung:  CTA b/l, normal effort.  Ab:  +BS, soft.  Skin:  Warm, dry to touch.  No rash.  Ext:  No pitting edema LE b/l.      Data   Recent Labs   Lab 08/22/19  0634 08/21/19  1718   WBC 11.2* 15.5*   HGB 10.6* 11.3*   MCV 99 98   PLT  105* 178    131*   POTASSIUM 3.5 3.5   CHLORIDE 105 100   CO2 24 22   BUN 32* 34*   CR 0.86 1.02   ANIONGAP 7 9   DAVE 7.8* 8.2*   GLC 87 119*   ALBUMIN  --  3.0*   PROTTOTAL  --  6.7*   BILITOTAL  --  2.2*   ALKPHOS  --  96   ALT  --  52*   AST  --  71*

## 2019-08-23 NOTE — PLAN OF CARE
Blood pressure elevated at 175/79, 167/74, and 173/80. Diminished LS. Complained of pain to back and pain med's given with relief. Forgetful. Tele is sinus rhythm. Slept well throughout the night.

## 2019-08-23 NOTE — PROGRESS NOTES
348 MF- blood pressure elevated at 167/74 and 175/79. Would you like her to have anything for this? Thank you    Order received for hydralazine 5mg IV every 4 hours PRN for SPB> 185.

## 2019-08-24 LAB
ANION GAP SERPL CALCULATED.3IONS-SCNC: 2 MMOL/L (ref 3–14)
ANISOCYTOSIS BLD QL SMEAR: SLIGHT
BASOPHILS # BLD AUTO: 0 10E9/L (ref 0–0.2)
BASOPHILS NFR BLD AUTO: 1 %
BUN SERPL-MCNC: 15 MG/DL (ref 7–30)
CALCIUM SERPL-MCNC: 8.5 MG/DL (ref 8.5–10.1)
CHLORIDE SERPL-SCNC: 108 MMOL/L (ref 94–109)
CO2 SERPL-SCNC: 28 MMOL/L (ref 20–32)
CREAT SERPL-MCNC: 0.49 MG/DL (ref 0.52–1.04)
DIFFERENTIAL METHOD BLD: ABNORMAL
EOSINOPHIL # BLD AUTO: 0.5 10E9/L (ref 0–0.7)
EOSINOPHIL NFR BLD AUTO: 12 %
ERYTHROCYTE [DISTWIDTH] IN BLOOD BY AUTOMATED COUNT: 16.7 % (ref 10–15)
GFR SERPL CREATININE-BSD FRML MDRD: 86 ML/MIN/{1.73_M2}
GLUCOSE SERPL-MCNC: 85 MG/DL (ref 70–99)
HCT VFR BLD AUTO: 29.8 % (ref 35–47)
HGB BLD-MCNC: 9.5 G/DL (ref 11.7–15.7)
LYMPHOCYTES # BLD AUTO: 0.9 10E9/L (ref 0.8–5.3)
LYMPHOCYTES NFR BLD AUTO: 20 %
MCH RBC QN AUTO: 31.3 PG (ref 26.5–33)
MCHC RBC AUTO-ENTMCNC: 31.9 G/DL (ref 31.5–36.5)
MCV RBC AUTO: 98 FL (ref 78–100)
MICROCYTES BLD QL SMEAR: PRESENT
MONOCYTES # BLD AUTO: 0.2 10E9/L (ref 0–1.3)
MONOCYTES NFR BLD AUTO: 5 %
NEUTROPHILS # BLD AUTO: 2.7 10E9/L (ref 1.6–8.3)
NEUTROPHILS NFR BLD AUTO: 62 %
OVALOCYTES BLD QL SMEAR: SLIGHT
PLATELET # BLD AUTO: 102 10E9/L (ref 150–450)
PLATELET # BLD EST: ABNORMAL 10*3/UL
POLYCHROMASIA BLD QL SMEAR: SLIGHT
POTASSIUM SERPL-SCNC: 3.6 MMOL/L (ref 3.4–5.3)
RBC # BLD AUTO: 3.04 10E12/L (ref 3.8–5.2)
SODIUM SERPL-SCNC: 138 MMOL/L (ref 133–144)
WBC # BLD AUTO: 4.3 10E9/L (ref 4–11)

## 2019-08-24 PROCEDURE — 25000132 ZZH RX MED GY IP 250 OP 250 PS 637: Mod: GY | Performed by: INTERNAL MEDICINE

## 2019-08-24 PROCEDURE — 12000000 ZZH R&B MED SURG/OB

## 2019-08-24 PROCEDURE — 80048 BASIC METABOLIC PNL TOTAL CA: CPT | Performed by: INTERNAL MEDICINE

## 2019-08-24 PROCEDURE — 85025 COMPLETE CBC W/AUTO DIFF WBC: CPT | Performed by: INTERNAL MEDICINE

## 2019-08-24 PROCEDURE — 36415 COLL VENOUS BLD VENIPUNCTURE: CPT | Performed by: INTERNAL MEDICINE

## 2019-08-24 PROCEDURE — 99232 SBSQ HOSP IP/OBS MODERATE 35: CPT | Performed by: INTERNAL MEDICINE

## 2019-08-24 RX ADMIN — VANCOMYCIN HYDROCHLORIDE 125 MG: KIT at 20:09

## 2019-08-24 RX ADMIN — GABAPENTIN 200 MG: 100 CAPSULE ORAL at 08:39

## 2019-08-24 RX ADMIN — ACETAMINOPHEN 650 MG: 325 TABLET, FILM COATED ORAL at 20:09

## 2019-08-24 RX ADMIN — CLONIDINE HYDROCHLORIDE 0.1 MG: 0.1 TABLET ORAL at 08:39

## 2019-08-24 RX ADMIN — Medication 1 MG: at 20:10

## 2019-08-24 RX ADMIN — OMEGA-3 FATTY ACIDS CAP 1000 MG 1 G: 1000 CAP at 08:39

## 2019-08-24 RX ADMIN — METOPROLOL SUCCINATE 100 MG: 100 TABLET, EXTENDED RELEASE ORAL at 08:39

## 2019-08-24 RX ADMIN — MULTIPLE VITAMINS W/ MINERALS TAB 1 TABLET: TAB at 08:39

## 2019-08-24 RX ADMIN — GABAPENTIN 200 MG: 100 CAPSULE ORAL at 20:10

## 2019-08-24 RX ADMIN — CLONIDINE HYDROCHLORIDE 0.1 MG: 0.1 TABLET ORAL at 20:10

## 2019-08-24 RX ADMIN — AMLODIPINE BESYLATE 10 MG: 10 TABLET ORAL at 08:40

## 2019-08-24 RX ADMIN — SERTRALINE HYDROCHLORIDE 100 MG: 100 TABLET ORAL at 08:39

## 2019-08-24 RX ADMIN — VANCOMYCIN HYDROCHLORIDE 125 MG: KIT at 09:19

## 2019-08-24 RX ADMIN — OXYCODONE HYDROCHLORIDE AND ACETAMINOPHEN 500 MG: 500 TABLET ORAL at 08:40

## 2019-08-24 ASSESSMENT — ACTIVITIES OF DAILY LIVING (ADL)
ADLS_ACUITY_SCORE: 13

## 2019-08-24 NOTE — PLAN OF CARE
PT - attempted to see had just walked with RN ~30 minutes prior, to their report ~400 feet, noting no balance issues. Possible home tomorrow. Per RN report is very close to meeting PT goals.

## 2019-08-24 NOTE — PLAN OF CARE
Hx: Pt was admitted on 08/21 for SOB and generalized weakness. Pt is being treated for sepsis 2/2 UTI. Pt has a Hx of ESBL in urine. PMH includes C. Diff, HTN, hyponatremia, AYAKA, osteoporosis, NSTEMI Hx, Chronic lumbar pain.  Orientation: A&OX4, Ambulation: 1X c/ GB and walker. V/S: SBP in 150 - 160's. Scheduled B/P Rx administered. VS otherwise stable. Pt is on RA. Pain: Mild back pain rated 6/10 that was relieved c/ repositioning. Tele/CV: SR.  LS: Clear, equal bilaterally.  : Voids spontaneously. Shift UOP was 200 mL as well as 2 unmeasured voiding occurrences.  GI: Active & Audible X4, LKBM was 08/22.  IV: SL, Flushes well.  Plan: Excepct hospitalization for 2 - 3 days. Possible discharge to TCU.  Other: Skin intact.

## 2019-08-24 NOTE — PLAN OF CARE
Tele-SR.  Pt is A&O x4.  Pt denies pain.  SBA with walker.  Contact iso for ESBL in urine.  VSS,  BP was elevated but trending down.  Blood cultures show no growth.  LS clear.  Possible discharge in 2-3 days TCU?  Continue plan of care.

## 2019-08-24 NOTE — PLAN OF CARE
Vss. A&OX4 . Denies pain. B/p controlled. Up with SBA and walker does very well. Has ambulated halls. Up to chair with meals. Voiding without dif.

## 2019-08-24 NOTE — PLAN OF CARE
Hx: Pt was admitted on 08/21 for SOB and generalized weakness. Pt is being treated for sepsis 2/2 UTI. Pt has a Hx of ESBL in urine. PMH includes C. Diff, HTN, hyponatremia, AYAKA, osteoporosis, NSTEMI Hx, Chronic lumbar pain.  Orientation: A&OX4, Ambulation: SBA c/ GB and walker. V/S: SBP in 140's - 150's. Scheduled B/P Rx administered. VS otherwise stable. Pt is on RA. Pain: Mild back pain rated 2/10. Tele/CV: SR c/ peaked T-waves and borderline prolonged QT. HR in low 60's.  LS: Clear, equal bilaterally.  : Voids spontaneously.  GI: Active & Audible X4, LKBM was 08/22.  IV: SL, Flushes well.  Plan: Excepct hospitalization for 2 - 3 days, pending ID Tx plan. Possible discharge to TCU.

## 2019-08-24 NOTE — PROGRESS NOTES
[facility]    Medicine Progress Note - Hospitalist Service       Date of Admission:  8/21/2019  Assessment & Plan   Daya Ignacio is a 88 year old female admitted on 8/21/2019. She has a past medical history significant for hypertension, GERD, compression fractures, macular degeneration, C. difficile colitis, and recurrent urinary tract infections with previous ESBL organisms.  She presented to emergency room with nausea, vomiting, and weakness.  Found to have urinary tract infection with severe sepsis.     1.  Severe sepsis.  As evidenced by leukocytosis, tachycardia, initial hypotension, and evidence of endorgan damage with lactic acidosis and elevated LFTs.  Source appears to be urinary tract infection.  She was given IV fluid bolus in the emergency room of a little more than 30 cc/kg.  Started on IV meropenem to cover previous noted organisms causing UTIs.  Continue IV fluids.  Continue IV meropenem.  Infectious disease consult.  8/22.  Appreciate infectious disease input.  Continue IV meropenem.  8/23.  Urine and blood cultures with no growth to date.  Per ID will discontinue meropenem  8/24: Procalcitonin drawn yesterday was elevated.  ID is following.  Cultures remain negative.  Patient was treated with meropenem we will continue to monitor clinically.  Defer antibiotics to infectious disease    2.  UTI.  History of ESBL producing organisms.  Add urine culture to urine sample sent from emergency room.    Continue IV meropenem.  Infectious disease consult.  8/22.  Urine culture pending.  Continue IV meropenem.  8/23 cultures remain negative.  ID consult appreciated will discontinue IV meropenem per ID  8/24: Procalcitonin drawn yesterday was elevated.  ID is following.  Cultures remain negative.  Patient was treated with meropenem we will continue to monitor clinically.  Defer antibiotics to infectious disease       3.  History of C. difficile colitis.  Start oral vancomycin 125 mg twice a day  for prophylaxis.  8/23 continue on oral vancomycin     4.  Hypertension.  Initially hypotensive due to sepsis.  Hold antihypertensives initially.  8/22.  Blood pressure better today.  Continue to hold antihypertensives today.  Likely able to restart blood pressure medications slowly  8/23 blood pressure elevated today reorder prior to admission amlodipine and metoprolol.  --Patient also on clonidine 0.1 twice daily will hold for now will resume if blood pressure allows     5.  Hyponatremia.  Mild.  Start continuous IV fluids.:  Resolved  8/22.  Sodium improved to 136 today.  Stop IV fluids.     6.  Lactic acidosis.  Due to sepsis.  Lactic acid level has improved with initial fluid bolus.  Resolved.  8/22.  Stop IV fluids.   8/23: Lactic acid 0.7       7.  Acute kidney injury.  Likely due to sepsis.  Continue IV fluids.  Repeat metabolic panel in the morning.  Avoid nephrotoxins as able.  8/22.  Creatinine improved to 0.8.  Stop IV fluids.   8/23  Recheck metabolic panel       8.  Depression.    8/22.  Restart sertraline.    9.  Deconditioning.  Physical therapy consult.    10.  Drop in hemoglobin  --Suspect anemia   --No acute loss of blood noted  --Recheck hemoglobin in a.m.    Diet: Combination Diet Regular Diet Adult    DVT Prophylaxis: Pneumatic Compression Devices  Almanza Catheter: not present  Code Status: DNR/DNI      Disposition Plan   Expected discharge: 2 - 3 days  Entered: Fany Dos Santos MD 08/24/2019, 9:09 AM       Fany Dos Santos MD  Hospitalist Service  [facility]    ______________________________________________________________________    Interval History   Reviewed chart .  Feels weak. No fever.  Back pain seems improved.  Denies chest pain, shortness of breath, fevers, chills, nausea, vomiting, or diarrhea.    Data reviewed today: I reviewed all medications, new labs and imaging results over the last 24 hours.    Physical Exam   Vital Signs: Temp: 97.8  F (36.6  C) Temp src:  Oral BP: 133/61 Pulse: 89 Heart Rate: 106 Resp: 18 SpO2: 98 % O2 Device: None (Room air)    Weight: 124 lbs 9.6 oz  Gen:  NAD, A&Ox3.  Eyes:  PERRL, sclera anicteric.  OP:  MMM, no lesions.  Neck:  Supple.  CV:  Regular, no murmurs.  Lung:  CTA b/l, normal effort.  Ab:  +BS, soft.  Skin:  Warm, dry to touch.  No rash.  Ext:  No pitting edema LE b/l.      Data   Recent Labs   Lab 08/24/19  0736 08/22/19  0634 08/21/19  1718   WBC 4.3 11.2* 15.5*   HGB 9.5* 10.6* 11.3*   MCV 98 99 98   * 105* 178    136 131*   POTASSIUM 3.6 3.5 3.5   CHLORIDE 108 105 100   CO2 28 24 22   BUN 15 32* 34*   CR 0.49* 0.86 1.02   ANIONGAP 2* 7 9   DAVE 8.5 7.8* 8.2*   GLC 85 87 119*   ALBUMIN  --   --  3.0*   PROTTOTAL  --   --  6.7*   BILITOTAL  --   --  2.2*   ALKPHOS  --   --  96   ALT  --   --  52*   AST  --   --  71*

## 2019-08-25 ENCOUNTER — APPOINTMENT (OUTPATIENT)
Dept: PHYSICAL THERAPY | Facility: CLINIC | Age: 84
DRG: 872 | End: 2019-08-25
Payer: MEDICARE

## 2019-08-25 LAB
ALBUMIN UR-MCNC: NEGATIVE MG/DL
ANION GAP SERPL CALCULATED.3IONS-SCNC: 3 MMOL/L (ref 3–14)
ANISOCYTOSIS BLD QL SMEAR: SLIGHT
APPEARANCE UR: CLEAR
BASOPHILS # BLD AUTO: 0 10E9/L (ref 0–0.2)
BASOPHILS NFR BLD AUTO: 0 %
BILIRUB UR QL STRIP: NEGATIVE
BUN SERPL-MCNC: 16 MG/DL (ref 7–30)
CALCIUM SERPL-MCNC: 8.4 MG/DL (ref 8.5–10.1)
CHLORIDE SERPL-SCNC: 108 MMOL/L (ref 94–109)
CO2 SERPL-SCNC: 29 MMOL/L (ref 20–32)
COLOR UR AUTO: YELLOW
CREAT SERPL-MCNC: 0.46 MG/DL (ref 0.52–1.04)
DIFFERENTIAL METHOD BLD: ABNORMAL
EOSINOPHIL # BLD AUTO: 0.3 10E9/L (ref 0–0.7)
EOSINOPHIL NFR BLD AUTO: 8 %
ERYTHROCYTE [DISTWIDTH] IN BLOOD BY AUTOMATED COUNT: 16.3 % (ref 10–15)
GFR SERPL CREATININE-BSD FRML MDRD: 88 ML/MIN/{1.73_M2}
GLUCOSE SERPL-MCNC: 88 MG/DL (ref 70–99)
GLUCOSE UR STRIP-MCNC: NEGATIVE MG/DL
HCT VFR BLD AUTO: 28.1 % (ref 35–47)
HGB BLD-MCNC: 9.3 G/DL (ref 11.7–15.7)
HGB UR QL STRIP: NEGATIVE
KETONES UR STRIP-MCNC: NEGATIVE MG/DL
LEUKOCYTE ESTERASE UR QL STRIP: ABNORMAL
LYMPHOCYTES # BLD AUTO: 1.1 10E9/L (ref 0.8–5.3)
LYMPHOCYTES NFR BLD AUTO: 28 %
MCH RBC QN AUTO: 31.6 PG (ref 26.5–33)
MCHC RBC AUTO-ENTMCNC: 33.1 G/DL (ref 31.5–36.5)
MCV RBC AUTO: 96 FL (ref 78–100)
MONOCYTES # BLD AUTO: 0.3 10E9/L (ref 0–1.3)
MONOCYTES NFR BLD AUTO: 7 %
MUCOUS THREADS #/AREA URNS LPF: PRESENT /LPF
NEUTROPHILS # BLD AUTO: 2.3 10E9/L (ref 1.6–8.3)
NEUTROPHILS NFR BLD AUTO: 57 %
NITRATE UR QL: NEGATIVE
OVALOCYTES BLD QL SMEAR: SLIGHT
PH UR STRIP: 7 PH (ref 5–7)
PLATELET # BLD AUTO: 104 10E9/L (ref 150–450)
PLATELET # BLD EST: ABNORMAL 10*3/UL
POTASSIUM SERPL-SCNC: 3.2 MMOL/L (ref 3.4–5.3)
PROCALCITONIN SERPL-MCNC: 2.34 NG/ML
RBC # BLD AUTO: 2.94 10E12/L (ref 3.8–5.2)
RBC #/AREA URNS AUTO: 1 /HPF (ref 0–2)
SODIUM SERPL-SCNC: 140 MMOL/L (ref 133–144)
SOURCE: ABNORMAL
SP GR UR STRIP: 1.01 (ref 1–1.03)
SQUAMOUS #/AREA URNS AUTO: 3 /HPF (ref 0–1)
UROBILINOGEN UR STRIP-MCNC: NORMAL MG/DL (ref 0–2)
WBC # BLD AUTO: 4 10E9/L (ref 4–11)
WBC #/AREA URNS AUTO: 7 /HPF (ref 0–5)

## 2019-08-25 PROCEDURE — 84145 PROCALCITONIN (PCT): CPT | Performed by: INTERNAL MEDICINE

## 2019-08-25 PROCEDURE — 25000132 ZZH RX MED GY IP 250 OP 250 PS 637: Mod: GY | Performed by: INTERNAL MEDICINE

## 2019-08-25 PROCEDURE — 36415 COLL VENOUS BLD VENIPUNCTURE: CPT | Performed by: INTERNAL MEDICINE

## 2019-08-25 PROCEDURE — 81001 URINALYSIS AUTO W/SCOPE: CPT | Performed by: INTERNAL MEDICINE

## 2019-08-25 PROCEDURE — 25000128 H RX IP 250 OP 636: Performed by: INTERNAL MEDICINE

## 2019-08-25 PROCEDURE — 87040 BLOOD CULTURE FOR BACTERIA: CPT | Performed by: INTERNAL MEDICINE

## 2019-08-25 PROCEDURE — 80048 BASIC METABOLIC PNL TOTAL CA: CPT | Performed by: INTERNAL MEDICINE

## 2019-08-25 PROCEDURE — 99207 ZZC CDG-MDM COMPONENT: MEETS LOW - DOWN CODED: CPT | Performed by: INTERNAL MEDICINE

## 2019-08-25 PROCEDURE — 87086 URINE CULTURE/COLONY COUNT: CPT | Performed by: INTERNAL MEDICINE

## 2019-08-25 PROCEDURE — 97116 GAIT TRAINING THERAPY: CPT | Mod: GP | Performed by: PHYSICAL THERAPY ASSISTANT

## 2019-08-25 PROCEDURE — 85025 COMPLETE CBC W/AUTO DIFF WBC: CPT | Performed by: INTERNAL MEDICINE

## 2019-08-25 PROCEDURE — 99232 SBSQ HOSP IP/OBS MODERATE 35: CPT | Performed by: INTERNAL MEDICINE

## 2019-08-25 PROCEDURE — 97530 THERAPEUTIC ACTIVITIES: CPT | Mod: GP | Performed by: PHYSICAL THERAPY ASSISTANT

## 2019-08-25 PROCEDURE — 12000000 ZZH R&B MED SURG/OB

## 2019-08-25 RX ORDER — POTASSIUM CHLORIDE 7.45 MG/ML
10 INJECTION INTRAVENOUS
Status: DISCONTINUED | OUTPATIENT
Start: 2019-08-25 | End: 2019-08-27 | Stop reason: HOSPADM

## 2019-08-25 RX ORDER — CLONIDINE HYDROCHLORIDE 0.1 MG/1
0.1 TABLET ORAL 3 TIMES DAILY
Status: DISCONTINUED | OUTPATIENT
Start: 2019-08-25 | End: 2019-08-27 | Stop reason: HOSPADM

## 2019-08-25 RX ORDER — POTASSIUM CHLORIDE 1.5 G/1.58G
20-40 POWDER, FOR SOLUTION ORAL
Status: DISCONTINUED | OUTPATIENT
Start: 2019-08-25 | End: 2019-08-27 | Stop reason: HOSPADM

## 2019-08-25 RX ORDER — POTASSIUM CHLORIDE 29.8 MG/ML
20 INJECTION INTRAVENOUS
Status: DISCONTINUED | OUTPATIENT
Start: 2019-08-25 | End: 2019-08-25

## 2019-08-25 RX ORDER — POTASSIUM CHLORIDE 1500 MG/1
20-40 TABLET, EXTENDED RELEASE ORAL
Status: DISCONTINUED | OUTPATIENT
Start: 2019-08-25 | End: 2019-08-27 | Stop reason: HOSPADM

## 2019-08-25 RX ORDER — POTASSIUM CL/LIDO/0.9 % NACL 10MEQ/0.1L
10 INTRAVENOUS SOLUTION, PIGGYBACK (ML) INTRAVENOUS
Status: DISCONTINUED | OUTPATIENT
Start: 2019-08-25 | End: 2019-08-27 | Stop reason: HOSPADM

## 2019-08-25 RX ORDER — MAGNESIUM SULFATE HEPTAHYDRATE 40 MG/ML
4 INJECTION, SOLUTION INTRAVENOUS EVERY 4 HOURS PRN
Status: DISCONTINUED | OUTPATIENT
Start: 2019-08-25 | End: 2019-08-27 | Stop reason: HOSPADM

## 2019-08-25 RX ADMIN — ACETAMINOPHEN 650 MG: 325 TABLET, FILM COATED ORAL at 16:47

## 2019-08-25 RX ADMIN — AMLODIPINE BESYLATE 10 MG: 10 TABLET ORAL at 08:10

## 2019-08-25 RX ADMIN — POTASSIUM CHLORIDE 40 MEQ: 1500 TABLET, EXTENDED RELEASE ORAL at 14:34

## 2019-08-25 RX ADMIN — GABAPENTIN 200 MG: 100 CAPSULE ORAL at 08:10

## 2019-08-25 RX ADMIN — OXYCODONE HYDROCHLORIDE AND ACETAMINOPHEN 500 MG: 500 TABLET ORAL at 08:10

## 2019-08-25 RX ADMIN — Medication 1 MG: at 20:06

## 2019-08-25 RX ADMIN — OMEGA-3 FATTY ACIDS CAP 1000 MG 1 G: 1000 CAP at 08:11

## 2019-08-25 RX ADMIN — METOPROLOL SUCCINATE 100 MG: 100 TABLET, EXTENDED RELEASE ORAL at 08:11

## 2019-08-25 RX ADMIN — GABAPENTIN 200 MG: 100 CAPSULE ORAL at 20:06

## 2019-08-25 RX ADMIN — CLONIDINE HYDROCHLORIDE 0.1 MG: 0.1 TABLET ORAL at 08:11

## 2019-08-25 RX ADMIN — VANCOMYCIN HYDROCHLORIDE 125 MG: KIT at 08:16

## 2019-08-25 RX ADMIN — CLONIDINE HYDROCHLORIDE 0.1 MG: 0.1 TABLET ORAL at 21:24

## 2019-08-25 RX ADMIN — VANCOMYCIN HYDROCHLORIDE 125 MG: KIT at 20:11

## 2019-08-25 RX ADMIN — CLONIDINE HYDROCHLORIDE 0.1 MG: 0.1 TABLET ORAL at 16:47

## 2019-08-25 RX ADMIN — ONDANSETRON HYDROCHLORIDE 4 MG: 2 INJECTION, SOLUTION INTRAMUSCULAR; INTRAVENOUS at 16:55

## 2019-08-25 RX ADMIN — SERTRALINE HYDROCHLORIDE 100 MG: 100 TABLET ORAL at 08:11

## 2019-08-25 RX ADMIN — POTASSIUM CHLORIDE 20 MEQ: 1500 TABLET, EXTENDED RELEASE ORAL at 20:40

## 2019-08-25 RX ADMIN — MULTIPLE VITAMINS W/ MINERALS TAB 1 TABLET: TAB at 08:10

## 2019-08-25 ASSESSMENT — ACTIVITIES OF DAILY LIVING (ADL)
ADLS_ACUITY_SCORE: 13

## 2019-08-25 NOTE — PROGRESS NOTES
[facility]    Medicine Progress Note - Hospitalist Service       Date of Admission:  8/21/2019  Assessment & Plan   Daya Ignacio is a 88 year old female admitted on 8/21/2019. She has a past medical history significant for hypertension, GERD, compression fractures, macular degeneration, C. difficile colitis, and recurrent urinary tract infections with previous ESBL organisms.  She presented to emergency room with nausea, vomiting, and weakness.  Found to have urinary tract infection with severe sepsis.     1.  Severe sepsis.  As evidenced by leukocytosis, tachycardia, initial hypotension, and evidence of endorgan damage with lactic acidosis and elevated LFTs.  Source appears to be urinary tract infection.  She was given IV fluid bolus in the emergency room of a little more than 30 cc/kg.  Started on IV meropenem to cover previous noted organisms causing UTIs.  Continue IV fluids.  Continue IV meropenem.  Infectious disease consult.  8/22.  Appreciate infectious disease input.  Continue IV meropenem.  8/23.  Urine and blood cultures with no growth to date.  Per ID will discontinue meropenem  8/24: Procalcitonin drawn yesterday was elevated.  ID is following.  Cultures remain negative.  Patient was treated with meropenem we will continue to monitor clinically.  Defer antibiotics to infectious disease  8/25 discussed with Dr. Ferro on 8/24/2019 we will continue to monitor.  Patient remains afebrile  --We will recheck/trend procalcitonin    2.  UTI.  History of ESBL producing organisms.  Add urine culture to urine sample sent from emergency room.    Continue IV meropenem.  Infectious disease consult.  8/22.  Urine culture pending.  Continue IV meropenem.  8/23 cultures remain negative.  ID consult appreciated will discontinue IV meropenem per ID  8/24: Procalcitonin drawn yesterday was elevated.  ID is following.  Cultures remain negative.  Patient was treated with meropenem we will continue to monitor  clinically.  Defer antibiotics to infectious disease  8/25: Discussed with Dr. Ferro will continue to hold antibiotic for now.       3.  History of C. difficile colitis.  Start oral vancomycin 125 mg twice a day for prophylaxis.  8/23 continue on oral vancomycin     4.  Hypertension.  Initially hypotensive due to sepsis.  Hold antihypertensives initially.  8/22.  Blood pressure better today.  Continue to hold antihypertensives today.  Likely able to restart blood pressure medications slowly  8/23 blood pressure elevated today reorder prior to admission amlodipine and metoprolol.  --Patient also on clonidine 0.1 twice daily will hold for now will resume if blood pressure allows  --8/25 Blood pressure remains elevated we will add clonidine 0.1      5.  Hyponatremia.  Mild.  Start continuous IV fluids.:  Resolved  8/22.  Sodium improved to 136 today.  Stop IV fluids.     6.  Lactic acidosis.  Due to sepsis.  Lactic acid level has improved with initial fluid bolus.  Resolved.  8/22.  Stop IV fluids.   8/23: Lactic acid 0.7       7.  Acute kidney injury.  Likely due to sepsis.  Continue IV fluids.  Repeat metabolic panel in the morning.  Avoid nephrotoxins as able.  8/22.  Creatinine improved to 0.8.  Stop IV fluids.   8/23  Recheck metabolic panel       8.  Depression.    8/22.  Restart sertraline.    9.  Deconditioning.  Physical therapy consult.    10.  Drop in hemoglobin  --Suspect anemia   --No acute loss of blood noted  --Recheck hemoglobin in a.m.    Diet: Combination Diet Regular Diet Adult    DVT Prophylaxis: Pneumatic Compression Devices  Almanza Catheter: not present  Code Status: DNR/DNI      Disposition Plan   Expected discharge: 2 - 3 days  Entered: Fany Dos Santos MD 08/25/2019, 9:05 AM       Fany Dos Santos MD  Hospitalist Service  [facility]    ______________________________________________________________________    Interval History   Reviewed chart .  Feels that she is weaker.   Complains of more dysuria. feels that she is not emptying her bladder feels weak. No fever.  Back pain seems improved.  Denies chest pain, shortness of breath, fevers, chills, nausea, vomiting, or diarrhea.    Data reviewed today: I reviewed all medications, new labs and imaging results over the last 24 hours.    Physical Exam   Vital Signs: Temp: 96.5  F (35.8  C) Temp src: Oral BP: (!) 187/89   Heart Rate: 67 Resp: 18 SpO2: 99 % O2 Device: None (Room air)    Weight: 124 lbs 9.6 oz  Gen:  NAD, A&Ox3.  Eyes:  PERRL, sclera anicteric.  OP:  MMM, no lesions.  Neck:  Supple.  CV:  Regular, no murmurs.  Lung:  CTA b/l, normal effort.  Ab:  +BS, soft.  Skin:  Warm, dry to touch.  No rash.  Ext:  No pitting edema LE b/l.      Data   Recent Labs   Lab 08/25/19  0655 08/24/19  0736 08/22/19  0634 08/21/19  1718   WBC 4.0 4.3 11.2* 15.5*   HGB 9.3* 9.5* 10.6* 11.3*   MCV 96 98 99 98   * 102* 105* 178    138 136 131*   POTASSIUM 3.2* 3.6 3.5 3.5   CHLORIDE 108 108 105 100   CO2 29 28 24 22   BUN 16 15 32* 34*   CR 0.46* 0.49* 0.86 1.02   ANIONGAP 3 2* 7 9   DAVE 8.4* 8.5 7.8* 8.2*   GLC 88 85 87 119*   ALBUMIN  --   --   --  3.0*   PROTTOTAL  --   --   --  6.7*   BILITOTAL  --   --   --  2.2*   ALKPHOS  --   --   --  96   ALT  --   --   --  52*   AST  --   --   --  71*

## 2019-08-25 NOTE — PLAN OF CARE
Contact isolation for ESBL, A&O x 4, SBA w/walker, regular diet, tele SR, no complaints of nausea, O2 97% RA, will continue to monitor.

## 2019-08-25 NOTE — PLAN OF CARE
B/p improved with meds. A&OX4. Slight forgetfulness. C/o difficulty urinating. PVR around 285 after voiding 100 ml. Straight cath orders for greater than 300. Ua/uc and Bc redrawn (TORB Dr. Ferro with infectious disease). Son and pt updated on poc personally by DR. Dos Santos. Pt ambulated with Staff x2 and again with PT. Up with A1 and walker and gaitbelt very steady. Good appetite.

## 2019-08-25 NOTE — PLAN OF CARE
Discharge Planner PT   Patient plan for discharge: per son waiting home vs TCU  Current status: Mod I bed mobility and gait to 450 feet with Rw ( uses 4ww at baseline) no path deviations or speed issues noted   Barriers to return to prior living situation: none  Recommendations for discharge: TCU per plan established by the PT.       Rationale for recommendations:Currently moving well and at levels that would indicate return home fromPT point of view only. Given other issues ( lives alone frequent admissions ,medical status etc TCU maybe warranted    Entered by: Alberta Pinzon 08/25/2019 1:55 PM

## 2019-08-26 ENCOUNTER — APPOINTMENT (OUTPATIENT)
Dept: PHYSICAL THERAPY | Facility: CLINIC | Age: 84
DRG: 872 | End: 2019-08-26
Payer: MEDICARE

## 2019-08-26 LAB
ANION GAP SERPL CALCULATED.3IONS-SCNC: 3 MMOL/L (ref 3–14)
BACTERIA SPEC CULT: NO GROWTH
BASOPHILS # BLD AUTO: 0 10E9/L (ref 0–0.2)
BASOPHILS NFR BLD AUTO: 0.4 %
BUN SERPL-MCNC: 16 MG/DL (ref 7–30)
CALCIUM SERPL-MCNC: 8.7 MG/DL (ref 8.5–10.1)
CHLORIDE SERPL-SCNC: 108 MMOL/L (ref 94–109)
CO2 SERPL-SCNC: 28 MMOL/L (ref 20–32)
CREAT SERPL-MCNC: 0.54 MG/DL (ref 0.52–1.04)
DIFFERENTIAL METHOD BLD: ABNORMAL
EOSINOPHIL # BLD AUTO: 0.3 10E9/L (ref 0–0.7)
EOSINOPHIL NFR BLD AUTO: 6.9 %
ERYTHROCYTE [DISTWIDTH] IN BLOOD BY AUTOMATED COUNT: 16.2 % (ref 10–15)
GFR SERPL CREATININE-BSD FRML MDRD: 83 ML/MIN/{1.73_M2}
GLUCOSE SERPL-MCNC: 89 MG/DL (ref 70–99)
HCT VFR BLD AUTO: 28.4 % (ref 35–47)
HGB BLD-MCNC: 8.8 G/DL (ref 11.7–15.7)
IMM GRANULOCYTES # BLD: 0.1 10E9/L (ref 0–0.4)
IMM GRANULOCYTES NFR BLD: 2.1 %
LYMPHOCYTES # BLD AUTO: 1.6 10E9/L (ref 0.8–5.3)
LYMPHOCYTES NFR BLD AUTO: 33.3 %
Lab: NORMAL
MAGNESIUM SERPL-MCNC: 1.8 MG/DL (ref 1.6–2.3)
MCH RBC QN AUTO: 30.2 PG (ref 26.5–33)
MCHC RBC AUTO-ENTMCNC: 31 G/DL (ref 31.5–36.5)
MCV RBC AUTO: 98 FL (ref 78–100)
MONOCYTES # BLD AUTO: 0.5 10E9/L (ref 0–1.3)
MONOCYTES NFR BLD AUTO: 10.9 %
NEUTROPHILS # BLD AUTO: 2.2 10E9/L (ref 1.6–8.3)
NEUTROPHILS NFR BLD AUTO: 46.4 %
NRBC # BLD AUTO: 0 10*3/UL
NRBC BLD AUTO-RTO: 0 /100
PLATELET # BLD AUTO: 119 10E9/L (ref 150–450)
POTASSIUM SERPL-SCNC: 3.8 MMOL/L (ref 3.4–5.3)
POTASSIUM SERPL-SCNC: 4 MMOL/L (ref 3.4–5.3)
RBC # BLD AUTO: 2.91 10E12/L (ref 3.8–5.2)
SODIUM SERPL-SCNC: 139 MMOL/L (ref 133–144)
SPECIMEN SOURCE: NORMAL
WBC # BLD AUTO: 4.8 10E9/L (ref 4–11)

## 2019-08-26 PROCEDURE — 25000132 ZZH RX MED GY IP 250 OP 250 PS 637: Mod: GY | Performed by: INTERNAL MEDICINE

## 2019-08-26 PROCEDURE — 36415 COLL VENOUS BLD VENIPUNCTURE: CPT | Performed by: INTERNAL MEDICINE

## 2019-08-26 PROCEDURE — 80048 BASIC METABOLIC PNL TOTAL CA: CPT | Performed by: INTERNAL MEDICINE

## 2019-08-26 PROCEDURE — 83735 ASSAY OF MAGNESIUM: CPT | Performed by: INTERNAL MEDICINE

## 2019-08-26 PROCEDURE — 84132 ASSAY OF SERUM POTASSIUM: CPT | Performed by: INTERNAL MEDICINE

## 2019-08-26 PROCEDURE — 97530 THERAPEUTIC ACTIVITIES: CPT | Mod: GP | Performed by: PHYSICAL THERAPY ASSISTANT

## 2019-08-26 PROCEDURE — 12000000 ZZH R&B MED SURG/OB

## 2019-08-26 PROCEDURE — 85025 COMPLETE CBC W/AUTO DIFF WBC: CPT | Performed by: INTERNAL MEDICINE

## 2019-08-26 PROCEDURE — 97116 GAIT TRAINING THERAPY: CPT | Mod: GP | Performed by: PHYSICAL THERAPY ASSISTANT

## 2019-08-26 PROCEDURE — 99232 SBSQ HOSP IP/OBS MODERATE 35: CPT | Performed by: INTERNAL MEDICINE

## 2019-08-26 PROCEDURE — 25000132 ZZH RX MED GY IP 250 OP 250 PS 637: Mod: GY | Performed by: STUDENT IN AN ORGANIZED HEALTH CARE EDUCATION/TRAINING PROGRAM

## 2019-08-26 RX ADMIN — ACETAMINOPHEN 650 MG: 325 TABLET, FILM COATED ORAL at 09:02

## 2019-08-26 RX ADMIN — SERTRALINE HYDROCHLORIDE 100 MG: 100 TABLET ORAL at 09:02

## 2019-08-26 RX ADMIN — Medication 1 MG: at 22:17

## 2019-08-26 RX ADMIN — MULTIPLE VITAMINS W/ MINERALS TAB 1 TABLET: TAB at 09:01

## 2019-08-26 RX ADMIN — GABAPENTIN 200 MG: 100 CAPSULE ORAL at 09:01

## 2019-08-26 RX ADMIN — METOPROLOL SUCCINATE 100 MG: 100 TABLET, EXTENDED RELEASE ORAL at 09:01

## 2019-08-26 RX ADMIN — AMLODIPINE BESYLATE 10 MG: 10 TABLET ORAL at 09:02

## 2019-08-26 RX ADMIN — VANCOMYCIN HYDROCHLORIDE 125 MG: KIT at 11:10

## 2019-08-26 RX ADMIN — OXYCODONE HYDROCHLORIDE 5 MG: 5 TABLET ORAL at 22:17

## 2019-08-26 RX ADMIN — CLONIDINE HYDROCHLORIDE 0.1 MG: 0.1 TABLET ORAL at 22:14

## 2019-08-26 RX ADMIN — OXYCODONE HYDROCHLORIDE AND ACETAMINOPHEN 500 MG: 500 TABLET ORAL at 09:01

## 2019-08-26 RX ADMIN — VANCOMYCIN HYDROCHLORIDE 125 MG: KIT at 22:15

## 2019-08-26 RX ADMIN — CLONIDINE HYDROCHLORIDE 0.1 MG: 0.1 TABLET ORAL at 09:02

## 2019-08-26 RX ADMIN — OMEGA-3 FATTY ACIDS CAP 1000 MG 1 G: 1000 CAP at 09:01

## 2019-08-26 RX ADMIN — CLONIDINE HYDROCHLORIDE 0.1 MG: 0.1 TABLET ORAL at 17:14

## 2019-08-26 RX ADMIN — GABAPENTIN 200 MG: 100 CAPSULE ORAL at 22:14

## 2019-08-26 ASSESSMENT — ACTIVITIES OF DAILY LIVING (ADL)
ADLS_ACUITY_SCORE: 13

## 2019-08-26 NOTE — PLAN OF CARE
Up in chair for meals.  Moves well.  Bladder scanned x1 after vd for only 53cc.  Informed Rounder.  Pale.  Hemoglobin 8.8 from 9.3. Rounder informed..  One stool, brown, formed.   Steady with walker but because lives alone is accepted at [facility]  TCU tomorrow

## 2019-08-26 NOTE — PLAN OF CARE
Discharge Planner PT   Patient plan for discharge: per son waiting home vs TCU  Current status:  PT - Pt amb 450' with ww indep with step thru gait pattern. Goal met  PT - Pt is indep sit to/from stand and bed to/from chair. Goal met Pt transfers sit to/from supine indep. Goal met  Barriers to return to prior living situation: none  Recommendations for discharge: Changed to home, after collaboration with the PT.   Rationale for recommendations:  PT - Collaborated with PT - all goals met - No further PT needs      Entered by: Gem Ribera 08/26/2019 2:40 PM

## 2019-08-26 NOTE — PROGRESS NOTES
D:  Per record review, pt's discharge recommendation is TCU.  Pt was accepted at [facility].    I:  Ahmet spoke with Constance at [facility] [phone] who wanted an update on the pt.  She said they planned for the pt to admit over the weekend, but she did not.  Ahmet informed her that it may be another day yet per record review.  Constance said that they will be able accept the pt tomorrow.    A/P:  Sw will continue with discharge planning and will be available as needed until discharge.

## 2019-08-26 NOTE — PLAN OF CARE
A&Ox4. VSS; afebrile. Denies pain. Up w/ SBA. Tolerating regular diet. Continues to be impulsive and getting up without using call light to go to bathroom despite being educated on calling for help. Continues on oral vancomycin. Tele SB.

## 2019-08-26 NOTE — PROGRESS NOTES
[facility]    Medicine Progress Note - Hospitalist Service       Date of Admission:  8/21/2019  Assessment & Plan   Daya Ignacio is a 88 year old female admitted on 8/21/2019. She has a past medical history significant for hypertension, GERD, compression fractures, macular degeneration, C. difficile colitis, and recurrent urinary tract infections with previous ESBL organisms.  She presented to emergency room with nausea, vomiting, and weakness.  Found to have urinary tract infection with severe sepsis.  She was treated with IV meropenem which was discontinued as cultures remain negative.  For now 2 days has been off antibiotics with no fever.  Procalcitonin was initially 7 down to 2.3.  Subjectively however patient feels more lethargic complains of generalized body ache malaise and weakness.     1.  Severe sepsis.  As evidenced by leukocytosis, tachycardia, initial hypotension, and evidence of endorgan damage with lactic acidosis and elevated LFTs.  Source appears to be urinary tract infection.  She was given IV fluid bolus in the emergency room of a little more than 30 cc/kg.  Started on IV meropenem to cover previous noted organisms causing UTIs.  Continue IV fluids.  Continue IV meropenem.  Infectious disease consult.  8/22.  Appreciate infectious disease input.  Continue IV meropenem.  8/23.  Urine and blood cultures with no growth to date.  Per ID will discontinue meropenem  8/24: Procalcitonin drawn yesterday was elevated.  ID is following.  Cultures remain negative.  Patient was treated with meropenem we will continue to monitor clinically.  Defer antibiotics to infectious disease  8/25 discussed with Dr. Ferro on 8/24/2019 we will continue to monitor.  Patient remains afebrile  8/26 Procalcitonin trending down today 2.3.  Now off antibiotics for more than 2 days with no fevers.    2.  UTI.  History of ESBL producing organisms.  Add urine culture to urine sample sent from emergency room.     Continue IV meropenem.  Infectious disease consult.  8/22.  Urine culture pending.  Continue IV meropenem.  8/23 cultures remain negative.  ID consult appreciated will discontinue IV meropenem per ID  8/24: Procalcitonin drawn yesterday was elevated.  ID is following.  Cultures remain negative.  Patient was treated with meropenem we will continue to monitor clinically.  Defer antibiotics to infectious disease  8/25: Discussed with Dr. Ferro will continue to hold antibiotic for now.       3.  History of C. difficile colitis.  Start oral vancomycin 125 mg twice a day for prophylaxis.  8/23 continue on oral vancomycin     4.  Hypertension.  Initially hypotensive due to sepsis.  Hold antihypertensives initially.  8/22.  Blood pressure better today.  Continue to hold antihypertensives today.  Likely able to restart blood pressure medications slowly  8/23 blood pressure elevated today reorder prior to admission amlodipine and metoprolol.  --Patient also on clonidine 0.1 twice daily will hold for now will resume if blood pressure allows  --8/25 Blood pressure remains elevated we will add clonidine 0.1      5.  Hyponatremia.  Mild.  Start continuous IV fluids.:  Resolved  8/22.  Sodium improved to 136 today.  Stop IV fluids.     6.  Lactic acidosis.  Due to sepsis.  Lactic acid level has improved with initial fluid bolus.  Resolved.  8/22.  Stop IV fluids.   8/23: Lactic acid 0.7    7.  Drop in hemoglobin.  Acute on chronic anemia  --No evidence of acute bleeding  --We will recheck hemoglobin in a.m.  --Vitals stable  --add ppi       8.  Acute kidney injury.  Likely due to sepsis.  Continue IV fluids.  Repeat metabolic panel in the morning.  Avoid nephrotoxins as able.  8/22.  Creatinine improved to 0.8.  Stop IV fluids.   8/23  Recheck metabolic panel       9.  Depression.    8/22.  Restart sertraline.    10  Deconditioning.  Physical therapy consult.        Diet: Combination Diet Regular Diet Adult  Room Service    DVT  Prophylaxis: Pneumatic Compression Devices  Almanza Catheter: not present  Code Status: DNR/DNI      Disposition Plan   Expected discharge: 1-2 days  Entered: Fany Dos Santos MD 08/26/2019, 1:23 PM       Fany Dos Santos MD  Hospitalist Service  [facility]    ______________________________________________________________________    Interval History   Reviewed chart .  Feels that she is weaker.  Complains of generalized malaise and weakness.  Is able to void today dysuria has improved somewhat.  Back pain seems improved.  Denies chest pain, shortness of breath, fevers, chills, nausea, vomiting, or diarrhea.    Data reviewed today: I reviewed all medications, new labs and imaging results over the last 24 hours.  Current Facility-Administered Medications   Medication     acetaminophen (TYLENOL) Suppository 650 mg     acetaminophen (TYLENOL) tablet 650 mg     amLODIPine (NORVASC) tablet 10 mg     cloNIDine (CATAPRES) tablet 0.1 mg     fish oil-omega-3 fatty acids capsule 1 g     gabapentin (NEURONTIN) capsule 200 mg     hydrALAZINE (APRESOLINE) injection 5 mg     lidocaine (LMX4) cream     lidocaine (XYLOCAINE) 2 % external gel     lidocaine 1 % 0.1-1 mL     magnesium sulfate 4 g in 100 mL sterile water (premade)     melatonin tablet 1 mg     metoprolol succinate ER (TOPROL-XL) 24 hr tablet 100 mg     multivitamin w/minerals (THERA-VIT-M) tablet 1 tablet     naloxone (NARCAN) injection 0.1-0.4 mg     nitroGLYcerin (NITROSTAT) sublingual tablet 0.4 mg     ondansetron (ZOFRAN-ODT) ODT tab 4 mg    Or     ondansetron (ZOFRAN) injection 4 mg     oxyCODONE (ROXICODONE) tablet 5 mg     potassium chloride (KLOR-CON) Packet 20-40 mEq     potassium chloride 10 mEq in 100 mL intermittent infusion with 10 mg lidocaine     potassium chloride 10 mEq in 100 mL sterile water intermittent infusion (premix)     potassium chloride ER (K-DUR/KLOR-CON M) CR tablet 20-40 mEq     prochlorperazine (COMPAZINE) injection 5  mg    Or     prochlorperazine (COMPAZINE) tablet 5 mg    Or     prochlorperazine (COMPAZINE) Suppository 12.5 mg     sertraline (ZOLOFT) tablet 100 mg     sodium chloride (PF) 0.9% PF flush 3 mL     sodium chloride (PF) 0.9% PF flush 3 mL     vancomycin (FIRVANQ) oral solution 125 mg     vitamin C (ASCORBIC ACID) tablet 500 mg       Physical Exam   Vital Signs: Temp: 96.1  F (35.6  C) Temp src: Oral BP: 126/50   Heart Rate: 53 Resp: 20 SpO2: 91 % O2 Device: None (Room air)    Weight: 124 lbs 9.6 oz  Gen:  NAD, A&Ox3.  Eyes:  PERRL, sclera anicteric.  OP:  MMM, no lesions.  Neck:  Supple.  CV:  Regular, no murmurs.  Lung:  CTA b/l, normal effort.  Ab:  +BS, soft.  Skin:  Warm, dry to touch.  No rash.  Ext:  No pitting edema LE b/l.      Data   Recent Labs   Lab 08/26/19  0712 08/26/19  0115 08/25/19  0655 08/24/19  0736  08/21/19  1718   WBC 4.8  --  4.0 4.3   < > 15.5*   HGB 8.8*  --  9.3* 9.5*   < > 11.3*   MCV 98  --  96 98   < > 98   *  --  104* 102*   < > 178     --  140 138   < > 131*   POTASSIUM 4.0 3.8 3.2* 3.6   < > 3.5   CHLORIDE 108  --  108 108   < > 100   CO2 28  --  29 28   < > 22   BUN 16  --  16 15   < > 34*   CR 0.54  --  0.46* 0.49*   < > 1.02   ANIONGAP 3  --  3 2*   < > 9   DAVE 8.7  --  8.4* 8.5   < > 8.2*   GLC 89  --  88 85   < > 119*   ALBUMIN  --   --   --   --   --  3.0*   PROTTOTAL  --   --   --   --   --  6.7*   BILITOTAL  --   --   --   --   --  2.2*   ALKPHOS  --   --   --   --   --  96   ALT  --   --   --   --   --  52*   AST  --   --   --   --   --  71*    < > = values in this interval not displayed.

## 2019-08-26 NOTE — PROGRESS NOTES
"[facility]  Infectious Disease Progress Note          Assessment and Plan:   IMPRESSION:   1.  An 88-year-old female, known to us from prior admission, currently admitted with increasing vaginal and urinary symptoms, abnormal urinalysis, some minimal systemic symptoms, presumed UTI, although somewhat complicated.   2.  History of recurrent UTIs including recent extended spectrum beta lactamase organism that has been treated.   3.  Prior Clostridium difficile colitis.   4.  Some urinary and vaginal symptoms, recurrent without a clear underlying infection etiology, recent cystoscopy negative.   5.  History of coronary artery disease.   6.  SULFA AND MACRODANTIN LISTED ALLERGIES.      RECOMMENDATIONS:   1. Continue oral vancomycin prophylaxis, 7 days total.   2.  Initial LGF, urinary sxs, abnormal UA, lactic acidosis, so logically UTI BUT cxs all neg T down and on ladi same urinary sxs persisited  3.  Given neg cxs , C diff risk, stopped ABX despite initial labs suggestive bacterial/UTI. tx sxs and see how does, no further feevr repeat UA near nl  OK disposition with us             Interval History:   no new complaints and doing well; no fever same vag/urinary sxs no fever sxs mildly weak  cxs neg              Medications:       amLODIPine  10 mg Oral Daily     cloNIDine  0.1 mg Oral TID     fish oil-omega-3 fatty acids  1 g Oral Daily     gabapentin  200 mg Oral BID     lidocaine   Urethral Once     metoprolol succinate ER  100 mg Oral Daily     multivitamin w/minerals  1 tablet Oral Daily     [START ON 8/27/2019] omeprazole  20 mg Oral QAM AC     sertraline  100 mg Oral Daily     sodium chloride (PF)  3 mL Intracatheter Q8H     vancomycin  125 mg Oral BID     vitamin C  500 mg Oral Daily                  Physical Exam:   Blood pressure (!) 176/73, pulse 89, temperature 95.6  F (35.3  C), temperature source Oral, resp. rate 20, height 1.549 m (5' 1\"), weight 56.5 kg (124 lb 9.6 oz), SpO2 99 %, " not currently breastfeeding.  Wt Readings from Last 2 Encounters:   08/22/19 56.5 kg (124 lb 9.6 oz)   08/15/19 54.1 kg (119 lb 3.2 oz)     Vital Signs with Ranges  Temp:  [95.6  F (35.3  C)-96.2  F (35.7  C)] 95.6  F (35.3  C)  Heart Rate:  [53-59] 59  Resp:  [16-20] 20  BP: (126-184)/(50-82) 176/73  SpO2:  [91 %-99 %] 99 %    Constitutional: Awake, alert, cooperative, no apparent distress   Lungs: Clear to auscultation bilaterally, no crackles or wheezing   Cardiovascular: Regular rate and rhythm, normal S1 and S2, and no murmur noted   Abdomen: Normal bowel sounds, soft, non-distended, non-tender   Skin: No rashes, no cyanosis, no edema   Other:           Data:   All microbiology laboratory data reviewed.  Recent Labs   Lab Test 08/26/19  0712 08/25/19  0655 08/24/19  0736   WBC 4.8 4.0 4.3   HGB 8.8* 9.3* 9.5*   HCT 28.4* 28.1* 29.8*   MCV 98 96 98   * 104* 102*     Recent Labs   Lab Test 08/26/19  0712 08/25/19  0655 08/24/19  0736   CR 0.54 0.46* 0.49*     No lab results found.  Recent Labs   Lab Test 08/25/19  1421 08/25/19  1409 08/25/19  1359 08/21/19  2221 08/21/19  1953 07/28/19  0841 07/28/19  0833 07/28/19  0801 07/22/19  1158   CULT Culture negative < 24 hours, reincubate No growth after 19 hours No growth after 19 hours No growth after 4 days  No growth after 4 days No growth No growth No growth No growth >100,000 colonies/mL  Escherichia coli ESBL  *  ESBL (extended beta lactamase) producing organisms require contact precautions.

## 2019-08-27 ENCOUNTER — HOSPITAL ENCOUNTER (INPATIENT)
Facility: CLINIC | Age: 84
LOS: 2 days | Discharge: NURSING, REHAB OR LONG-TERM CARE | DRG: 494 | End: 2019-08-30
Attending: EMERGENCY MEDICINE | Admitting: INTERNAL MEDICINE
Payer: MEDICARE

## 2019-08-27 VITALS
DIASTOLIC BLOOD PRESSURE: 65 MMHG | TEMPERATURE: 96.3 F | HEART RATE: 89 BPM | BODY MASS INDEX: 23.53 KG/M2 | WEIGHT: 124.6 LBS | HEIGHT: 61 IN | RESPIRATION RATE: 18 BRPM | SYSTOLIC BLOOD PRESSURE: 153 MMHG | OXYGEN SATURATION: 97 %

## 2019-08-27 DIAGNOSIS — S82.401A TIBIA/FIBULA FRACTURE, RIGHT, CLOSED, INITIAL ENCOUNTER: ICD-10-CM

## 2019-08-27 DIAGNOSIS — S82.201A TIBIA/FIBULA FRACTURE, RIGHT, CLOSED, INITIAL ENCOUNTER: ICD-10-CM

## 2019-08-27 DIAGNOSIS — A04.72 C. DIFFICILE COLITIS: ICD-10-CM
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
## 2019-08-27 LAB
ANION GAP SERPL CALCULATED.3IONS-SCNC: 3 MMOL/L (ref 3–14)
BASOPHILS # BLD AUTO: 0 10E9/L (ref 0–0.2)
BASOPHILS NFR BLD AUTO: 0.5 %
BUN SERPL-MCNC: 13 MG/DL (ref 7–30)
CALCIUM SERPL-MCNC: 8.8 MG/DL (ref 8.5–10.1)
CHLORIDE SERPL-SCNC: 105 MMOL/L (ref 94–109)
CO2 SERPL-SCNC: 28 MMOL/L (ref 20–32)
CREAT SERPL-MCNC: 0.47 MG/DL (ref 0.52–1.04)
DIFFERENTIAL METHOD BLD: ABNORMAL
EOSINOPHIL # BLD AUTO: 0.3 10E9/L (ref 0–0.7)
EOSINOPHIL NFR BLD AUTO: 5.5 %
ERYTHROCYTE [DISTWIDTH] IN BLOOD BY AUTOMATED COUNT: 15.9 % (ref 10–15)
GFR SERPL CREATININE-BSD FRML MDRD: 88 ML/MIN/{1.73_M2}
GLUCOSE SERPL-MCNC: 80 MG/DL (ref 70–99)
HCT VFR BLD AUTO: 28.8 % (ref 35–47)
HGB BLD-MCNC: 9 G/DL (ref 11.7–15.7)
IMM GRANULOCYTES # BLD: 0.2 10E9/L (ref 0–0.4)
IMM GRANULOCYTES NFR BLD: 3.6 %
LYMPHOCYTES # BLD AUTO: 1.9 10E9/L (ref 0.8–5.3)
LYMPHOCYTES NFR BLD AUTO: 30.1 %
MCH RBC QN AUTO: 30.7 PG (ref 26.5–33)
MCHC RBC AUTO-ENTMCNC: 31.3 G/DL (ref 31.5–36.5)
MCV RBC AUTO: 98 FL (ref 78–100)
MONOCYTES # BLD AUTO: 0.7 10E9/L (ref 0–1.3)
MONOCYTES NFR BLD AUTO: 10.7 %
NEUTROPHILS # BLD AUTO: 3.1 10E9/L (ref 1.6–8.3)
NEUTROPHILS NFR BLD AUTO: 49.6 %
NRBC # BLD AUTO: 0 10*3/UL
NRBC BLD AUTO-RTO: 0 /100
PLATELET # BLD AUTO: 143 10E9/L (ref 150–450)
POTASSIUM SERPL-SCNC: 3.7 MMOL/L (ref 3.4–5.3)
RBC # BLD AUTO: 2.93 10E12/L (ref 3.8–5.2)
SODIUM SERPL-SCNC: 136 MMOL/L (ref 133–144)
WBC # BLD AUTO: 6.2 10E9/L (ref 4–11)

## 2019-08-27 PROCEDURE — 36415 COLL VENOUS BLD VENIPUNCTURE: CPT | Performed by: INTERNAL MEDICINE

## 2019-08-27 PROCEDURE — 93005 ELECTROCARDIOGRAM TRACING: CPT

## 2019-08-27 PROCEDURE — 29515 APPLICATION SHORT LEG SPLINT: CPT | Mod: RT

## 2019-08-27 PROCEDURE — 25000132 ZZH RX MED GY IP 250 OP 250 PS 637: Mod: GY | Performed by: INTERNAL MEDICINE

## 2019-08-27 PROCEDURE — 80048 BASIC METABOLIC PNL TOTAL CA: CPT | Performed by: INTERNAL MEDICINE

## 2019-08-27 PROCEDURE — 99285 EMERGENCY DEPT VISIT HI MDM: CPT | Mod: 25

## 2019-08-27 PROCEDURE — 85025 COMPLETE CBC W/AUTO DIFF WBC: CPT | Performed by: INTERNAL MEDICINE

## 2019-08-27 PROCEDURE — 99239 HOSP IP/OBS DSCHRG MGMT >30: CPT | Performed by: INTERNAL MEDICINE

## 2019-08-27 RX ORDER — MORPHINE SULFATE 4 MG/ML
4 INJECTION, SOLUTION INTRAMUSCULAR; INTRAVENOUS
Status: DISCONTINUED | OUTPATIENT
Start: 2019-08-27 | End: 2019-08-28

## 2019-08-27 RX ORDER — ONDANSETRON 2 MG/ML
4 INJECTION INTRAMUSCULAR; INTRAVENOUS ONCE
Status: COMPLETED | OUTPATIENT
Start: 2019-08-27 | End: 2019-08-28

## 2019-08-27 RX ORDER — VANCOMYCIN HYDROCHLORIDE 50 MG/ML
125 KIT ORAL 2 TIMES DAILY
Qty: 35 ML | Refills: 0 | Status: SHIPPED | OUTPATIENT
Start: 2019-08-27 | End: 2019-09-09

## 2019-08-27 RX ORDER — LIDOCAINE 40 MG/G
CREAM TOPICAL
Status: DISCONTINUED | OUTPATIENT
Start: 2019-08-27 | End: 2019-08-28

## 2019-08-27 RX ADMIN — OMEGA-3 FATTY ACIDS CAP 1000 MG 1 G: 1000 CAP at 08:39

## 2019-08-27 RX ADMIN — ACETAMINOPHEN 650 MG: 325 TABLET, FILM COATED ORAL at 08:39

## 2019-08-27 RX ADMIN — OXYCODONE HYDROCHLORIDE AND ACETAMINOPHEN 500 MG: 500 TABLET ORAL at 08:39

## 2019-08-27 RX ADMIN — CLONIDINE HYDROCHLORIDE 0.1 MG: 0.1 TABLET ORAL at 08:39

## 2019-08-27 RX ADMIN — MULTIPLE VITAMINS W/ MINERALS TAB 1 TABLET: TAB at 08:39

## 2019-08-27 RX ADMIN — SERTRALINE HYDROCHLORIDE 100 MG: 100 TABLET ORAL at 08:39

## 2019-08-27 RX ADMIN — VANCOMYCIN HYDROCHLORIDE 125 MG: KIT at 08:40

## 2019-08-27 RX ADMIN — AMLODIPINE BESYLATE 10 MG: 10 TABLET ORAL at 08:39

## 2019-08-27 RX ADMIN — METOPROLOL SUCCINATE 100 MG: 100 TABLET, EXTENDED RELEASE ORAL at 08:39

## 2019-08-27 RX ADMIN — OMEPRAZOLE 20 MG: 20 CAPSULE, DELAYED RELEASE ORAL at 08:40

## 2019-08-27 RX ADMIN — GABAPENTIN 200 MG: 100 CAPSULE ORAL at 08:39

## 2019-08-27 ASSESSMENT — ACTIVITIES OF DAILY LIVING (ADL)
ADLS_ACUITY_SCORE: 13

## 2019-08-27 NOTE — DISCHARGE INSTRUCTIONS
Your home care referral was sent to [facility].  If you haven't heard from them within the next 24-48 hours,  Please call them at [phone].

## 2019-08-27 NOTE — PLAN OF CARE
Md determined this pt was adequate for discharge.  PIV and tele removed.  Writer went through all discharge instructions, including all medications and follow up appointments.  Pt and DIL expressed understanding of all discharge teaching and all questions were answered.  Pt was transferred to the main entrance via WC, and family transported home.

## 2019-08-27 NOTE — PLAN OF CARE
DX : Sepsis/UTI     HX :HTN, GERD, UTI, ESBL, Compression fracture, CAD  LABS : HBG 8.8, [facility] 8.0   TELE:  SR   GI/ : Continent of B&B.  DIET : Regular diet  ASSESS; A&OX4. BP elevated : 176/73.  Gave clonidine and 126/65 with recheck. On oral Vanco prophylactic. Melatonin for sleep.  PAIN : gave oxycodone X1 for pain.  ACTIVITY :  Up with SBA to independent with walker.  TEACHING : safety   PLAN FOR DISCHARGE :  Tomorrow to [place]

## 2019-08-27 NOTE — PROGRESS NOTES
Discharge Planner   Discharge Plans in progress: Pt will discharge home today with resumption of [facility] RN/PT/OT/SW orders.  Sw sent the referral to [facility] via email.  Pt's dtr-in-law will provide transport.     08/27/19 [id number]   Final Resources   Resources List Home Care   Home Care [facility] [phone], Fax: [phone]     Barriers to discharge plan: None  Follow up plan: Sw will continue to be available as needed until discharge.       Entered by: Kady Cordon 08/27/2019 1:40 PM

## 2019-08-27 NOTE — DISCHARGE SUMMARY
[facility]  Discharge Summary  Hospitalist      Date of Admission:  8/21/2019  Date of Discharge:  8/27/2019  Provider:  Fany Dos Santos MD  Date of Service (when I last saw the patient): 08/27/19      Primary Provider: Marcy Soares          Discharge Diagnosis:   Discharge Diagnoses   Severe sepsis  secondary to possible urinary tract infection  UTI with history of ESBL  History of C. difficile colitis    Other medical issues:  Past Medical History:   Diagnosis Date     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 4/16/2014     Closed fracture of unspecified part of neck of femur      Closed fracture of unspecified part of vertebral column without mention of spinal cord injury     from fall summer 06     Clostridium difficile colitis 06/2019     Hypertension      Lumbar compression fracture (H)      Macular degeneration (senile) of retina, unspecified      Upper GI bleed 06/2019     Urinary tract infection due to extended-spectrum beta lactamase (ESBL)-producing Klebsiella 05/2019    resistant to Bactrim          History of Present Illness   Daya Ignacio is an 88 year old female who presented with generalized body ache malaise and weakness.  Please see the admission history and physical for full details.    Hospital Course     Daya Ignacio was admitted on 8/21/2019.  She has a past medical history significant for hypertension, GERD, compression fractures, macular degeneration, C. difficile colitis, and recurrent urinary tract infections with previous ESBL organisms.  She presented to emergency room with nausea, vomiting, and weakness.  Found to have urinary tract infection with severe sepsis.  She was treated with IV meropenem which was discontinued as cultures remain negative.   Procalcitonin was initially 7 down to 2.3.   Clinically and subjectively improved.  Remained afebrile despite being off IV antibiotics.  Per infectious disease will discharge patient on oral vancomycin for C.  difficile for 1 more week discharge.  During her hospital stay she was retaining urine but that also improved prior to discharge.  Patient presented to the hospital from transitional care unit for her deconditioning and weakness.  She was reassessed by physical therapy and rehab while in hospital.  She was doing well and is being discharged home with home therapies.    The following problems were treated during hospitalization    1.  Severe sepsis.  As evidenced by leukocytosis, tachycardia, initial hypotension, and evidence of endorgan damage with lactic acidosis and elevated LFTs.  Source appears to be urinary tract infection.  She was given IV fluid bolus in the emergency room of a little more than 30 cc/kg.  Started on IV meropenem to cover previous noted organisms causing UTIs.  Continue IV fluids.    Infectious disease consulted.    Cultures remain negative meropenem was discontinued.  Patient continued to improve.  Is being discharged home.      2.  UTI.  History of ESBL producing organisms.  Given prior history she was treated with IV meropenem during hospitalization to repeat urine cultures were negative for ESBL..        3.  History of C. difficile colitis.  Start oral vancomycin 125 mg twice a day for prophylaxis.  8/23 continue on oral vancomycin  --ID recommendation will continue oral vancomycin for 1 more week     4.  Hypertension.  Initially hypotensive due to sepsis.  --She was initially hypotensive due to sepsis.  Subsequently hypertensive at home meds were resumed    5.  Hyponatremia.  Mild.  Start continuous IV fluids.:  Resolved  --Likely secondary to dehydration     6.  Lactic acidosis.  Due to sepsis.  Lactic acid level has improved with initial fluid bolus.  Resolved.       7.  Drop in hemoglobin.  Acute on chronic anemia  --No evidence of acute bleeding  --Hemoglobin remained stable around 9  --Vitals stable  --Started on PPI        8.  Acute kidney injury.  Likely due to sepsis.   --Improved  with hydration and IV antibiotics     9.  Depression.    Continued on sertraline.     10  Deconditioning.  Physical therapy consult.  --Continues to improve will be discharged home and will need home therapies    Significant Results and Procedures   As noted above    Pending Results   Unresulted Labs Ordered in the Past 30 Days of this Admission     Date and Time Order Name Status Description    8/25/2019 1342 Blood culture Preliminary     8/25/2019 1342 Blood culture Preliminary     8/21/2019 2208 Blood culture Preliminary     8/21/2019 2208 Blood culture Preliminary           Code Status   DNR / DNI       Primary Care Physician   Marcy Soares    Physical Exam   Temp: 96.3  F (35.7  C) Temp src: Oral BP: (!) 153/65   Heart Rate: 62 Resp: 18 SpO2: 97 % O2 Device: None (Room air)    Vitals:    08/21/19 2004 08/22/19 1703   Weight: 50.8 kg (111 lb 15.9 oz) 56.5 kg (124 lb 9.6 oz)     Vital Signs with Ranges  Temp:  [95.6  F (35.3  C)-97  F (36.1  C)] 96.3  F (35.7  C)  Heart Rate:  [59-81] 62  Resp:  [18-20] 18  BP: (126-176)/(54-73) 153/65  SpO2:  [97 %-99 %] 97 %  I/O last 3 completed shifts:  In: 240 [P.O.:240]  Out: 850 [Urine:850]    Gen:  NAD, A&Ox3.  Eyes:  PERRL, sclera anicteric.  OP:  MMM, no lesions.  Neck:  Supple.  CV:  Regular, no murmurs.  Lung:  CTA b/l, normal effort.  Ab:  +BS, soft.  Skin:  Warm, dry to touch.  No rash.  Ext:  No pitting edema LE b/l.      Discharge Disposition   Discharged to home    Consultations This Hospital Stay   PHYSICAL THERAPY ADULT IP CONSULT  INFECTIOUS DISEASES IP CONSULT  CARE COORDINATOR IP CONSULT    Time Spent on this Encounter   Fany LYNN MD, personally saw the patient today and spent greater than 30 minutes discharging this patient.    Discharge Orders      Home care nursing referral      Home Care PT Referral for Hospital Discharge      Home Care OT Referral for Hospital Discharge      Reason for your hospital stay    Please refer to discharge  summary briefly patient admitted for possible UTI     Follow-up and recommended labs and tests     Follow up with primary care provider, Marcy Soares, within 7 days for hospital follow- up.  The following labs/tests are recommended: CBC to monitor for anemia  Please call or come if there are any new or worsening symptoms.     Activity    Your activity upon discharge: activity as tolerated     MD face to face encounter    Documentation of Face to Face and Certification for Home Health Services    I certify that patient: Daya Ignacio is under my care and that I, or a nurse practitioner or physician's assistant working with me, had a face-to-face encounter that meets the physician face-to-face encounter requirements with this patient on: 8/27/2019.    This encounter with the patient was in whole, or in part, for the following medical condition, which is the primary reason for home health care: Deconditioning increased weakness recurrent infections    I certify that, based on my findings, the following services are medically necessary home health services: Nursing, Occupational Therapy, Physical Therapy and Social Work.    My clinical findings support the need for the above services because: Nurse is needed: To provide assessment and oversight required in the home to assure adherence to the medical plan due to: deconditioning and recurrent infections.., Occupational Therapy Services are needed to assess and treat cognitive ability and address ADL safety due to impairment in congnition and increased weakness., Physical Therapy Services are needed to assess and treat the following functional impairments: deconditioned significant weakness    Further, I certify that my clinical findings support that this patient is homebound (i.e. absences from home require considerable and taxing effort and are for medical reasons or [employer] services or infrequently or of short duration when for other reasons) because: Requires  assistance of another person or specialized equipment to access medical services because patient: Is unable to exit home safely on own due to: cognitive decline, weakness.    Based on the above findings. I certify that this patient is confined to the home and needs intermittent skilled nursing care, physical therapy and/or speech therapy.  The patient is under my care, and I have initiated the establishment of the plan of care.  This patient will be followed by a physician who will periodically review the plan of care.  Physician/Provider to provide follow up care: Marcy Soares    Attending hospital physician (the Medicare certified [facility] provider): Fany Dos Santos MD  Physician Signature: See electronic signature associated with these discharge orders.  Date: 8/27/2019     DNR/DNI     Diet    Follow this diet upon discharge: Orders Placed This Encounter      Room Service      Combination Diet Regular Diet Adult     Discharge Medications   Current Discharge Medication List      START taking these medications    Details   omeprazole (PRILOSEC) 20 MG DR capsule Take 1 capsule (20 mg) by mouth every morning (before breakfast)  Qty: 30 capsule, Refills: 0    Associated Diagnoses: Gastroesophageal reflux disease with esophagitis      vancomycin (FIRVANQ) 50 MG/ML oral solution Take 2.5 mLs (125 mg) by mouth 2 times daily for 7 days  Qty: 35 mL, Refills: 0    Associated Diagnoses: C. difficile colitis         CONTINUE these medications which have NOT CHANGED    Details   amLODIPine (NORVASC) 10 MG tablet Take 1 tablet (10 mg) by mouth daily  Qty: 90 tablet, Refills: 1    Associated Diagnoses: Non-STEMI (non-ST elevated myocardial infarction) (H)      Calcium Carbonate (CALCIUM 600 PO) Take 1 tablet by mouth 2 times daily       Cholecalciferol (VITAMIN D3) 2000 UNITS CAPS Take 2,000 Units by mouth daily (with dinner)      cloNIDine (CATAPRES) 0.1 MG tablet TAKE ONE TABLET BY MOUTH TWO TIMES A DAY  Qty: 180  tablet, Refills: 0    Associated Diagnoses: Essential hypertension      fish oil-omega-3 fatty acids 1000 MG capsule Take 1 g by mouth daily      gabapentin (NEURONTIN) 100 MG capsule Take 2 capsules (200 mg) by mouth 2 times daily  Qty: 120 capsule, Refills: 0    Associated Diagnoses: Other chronic pain      isosorbide dinitrate (ISORDIL) 10 MG tablet Take 1 tablet (10 mg) by mouth 3 times daily  Qty: 270 tablet, Refills: 3    Associated Diagnoses: Non-STEMI (non-ST elevated myocardial infarction) (H)      Lidocaine (LIDOCARE) 4 % Patch Apply to low back topically one time a day for pain and remove per schedule as needed.      loperamide (IMODIUM) 2 MG capsule Take 1 capsule (2 mg) by mouth 4 times daily as needed for diarrhea    Associated Diagnoses: Diarrhea, unspecified type      metoprolol succinate ER (TOPROL-XL) 100 MG 24 hr tablet Take 1 tablet (100 mg) by mouth daily  Qty: 90 tablet, Refills: 1    Associated Diagnoses: Essential hypertension      Multiple Vitamins-Minerals (PRESERVISION AREDS) CAPS Take 1 capsule by mouth 2 times daily       sertraline (ZOLOFT) 100 MG tablet Take 1 tablet (100 mg) by mouth daily  Qty: 90 tablet, Refills: 1    Associated Diagnoses: Generalized anxiety disorder      vitamin C (ASCORBIC ACID) 500 MG tablet Take 500 mg by mouth daily      estradiol (ESTRACE) 0.1 MG/GM vaginal cream Place 2 g vaginally three times a week paraben-free  Qty: 6 g, Refills: 3    Associated Diagnoses: Symptomatic menopausal or female climacteric states         STOP taking these medications       ibuprofen (ADVIL/MOTRIN) 200 MG tablet Comments:   Reason for Stopping:             Allergies   Allergies   Allergen Reactions     Atorvastatin Muscle Pain (Myalgia)     Macrobid [Nitrofurantoin Anhydrous] Other (See Comments)     Body aches     Sulfa Drugs      Tolerated Bactrim May 2019     Data   Most Recent 3 CBC's:  Recent Labs   Lab Test 08/27/19  0715 08/26/19  0712 08/25/19  0655   WBC 6.2 4.8 4.0    HGB 9.0* 8.8* 9.3*   MCV 98 98 96   * 119* 104*      Most Recent 3 BMP's:  Recent Labs   Lab Test 08/27/19  0715 08/26/19  0712 08/26/19  0115 08/25/19  0655    139  --  140   POTASSIUM 3.7 4.0 3.8 3.2*   CHLORIDE 105 108  --  108   CO2 28 28  --  29   BUN 13 16  --  16   CR 0.47* 0.54  --  0.46*   ANIONGAP 3 3  --  3   DAVE 8.8 8.7  --  8.4*   GLC 80 89  --  88     Most Recent 2 LFT's:  Recent Labs   Lab Test 08/21/19  1718 07/22/19  1014   AST 71* 13   ALT 52* 16   ALKPHOS 96 83   BILITOTAL 2.2* 0.8     Most Recent INR's and Anticoagulation Dosing History:  Anticoagulation Dose History     Recent Dosing and Labs Latest Ref Rng & Units 6/23/2011 6/24/2011 6/24/2011 6/25/2011 6/26/2011 6/27/2011 6/20/2017    INR 0.86 - 1.14 1.66(H) 3.09(H) 1.53(H) 1.40(H) 1.30(H) 1.21(H) 1.18(H)        Most Recent 3 Troponin's:  Recent Labs   Lab Test 07/22/19  1014 07/13/19  1309 05/31/19  1040   TROPI <0.015 <0.015 <0.015     Most Recent Cholesterol Panel:  Recent Labs   Lab Test 02/19/19  1022   CHOL 176   *   HDL 49*   TRIG 95     Most Recent 6 Bacteria Isolates From Any Culture (See EPIC Reports for Culture Details):  Recent Labs   Lab Test 08/25/19  1421 08/25/19  1409 08/25/19  1359 08/21/19  2221 08/21/19  1953 07/28/19  0841   CULT No growth No growth after 2 days No growth after 2 days No growth after 5 days  No growth after 5 days No growth No growth     Most Recent TSH, T4 and A1c Labs:  Recent Labs   Lab Test 01/13/17  1020  08/23/13  0958   TSH  --   --  0.91   A1C 5.3   < >  --     < > = values in this interval not displayed.     Results for orders placed or performed during the hospital encounter of 08/21/19   XR Chest 2 Views    Narrative    EXAM: XR CHEST 2 VW  LOCATION: [facility]  DATE/TIME: 8/21/2019 6:25 PM    INDICATION: Dyspnea shortness of breath  COMPARISON: May 28, 2017    FINDINGS: Areas of linear atelectasis left base and right base.. Prominence of the interstitial  markings. Heart size normal. ASCVD aorta. Degenerative change thoracic spine           Disclaimer: This note consists of symbols derived from keyboarding, dictation and/or voice recognition software. As a result, there may be errors in the script that have gone undetected. Please consider this when interpreting information found in this chart.

## 2019-08-27 NOTE — PLAN OF CARE
Orientation: Alert and oriented x 4  VSS. 97% on RA.   Tele:SB. HR 77.   LS: clear and equal  GI:  Passing gas. No BM. Denies N/V.   : Adequate urine output.   Skin: clean and dry with some bruising   Activity: SBA w/ walker. . Pt slept comfortably throughout shift.   Pain: 0/10. Denied  Plan: Continue with current cares. Hx of HTN, GERD, UT,  ESBL, CAD, a compression fx. Pt is on PO vanco for prophylactic. Hgb 8.8 Pt is on contact precautions for ESBL. Plan for pt to discharge to [place] today.

## 2019-08-28 ENCOUNTER — ANESTHESIA EVENT (OUTPATIENT)
Dept: SURGERY | Facility: CLINIC | Age: 84
DRG: 494 | End: 2019-08-28
Payer: MEDICARE

## 2019-08-28 ENCOUNTER — APPOINTMENT (OUTPATIENT)
Dept: GENERAL RADIOLOGY | Facility: CLINIC | Age: 84
DRG: 494 | End: 2019-08-28
Attending: EMERGENCY MEDICINE
Payer: MEDICARE

## 2019-08-28 ENCOUNTER — ANESTHESIA (OUTPATIENT)
Dept: SURGERY | Facility: CLINIC | Age: 84
DRG: 494 | End: 2019-08-28
Payer: MEDICARE

## 2019-08-28 ENCOUNTER — APPOINTMENT (OUTPATIENT)
Dept: GENERAL RADIOLOGY | Facility: CLINIC | Age: 84
DRG: 494 | End: 2019-08-28
Attending: INTERNAL MEDICINE
Payer: MEDICARE

## 2019-08-28 PROBLEM — S82.831A CLOSED FRACTURE OF PROXIMAL END OF RIGHT TIBIA AND FIBULA: Status: ACTIVE | Noted: 2019-08-28

## 2019-08-28 PROBLEM — S82.101A CLOSED FRACTURE OF PROXIMAL END OF RIGHT TIBIA AND FIBULA: Status: ACTIVE | Noted: 2019-08-28

## 2019-08-28 LAB
ABO + RH BLD: NORMAL
ABO + RH BLD: NORMAL
ANION GAP SERPL CALCULATED.3IONS-SCNC: 7 MMOL/L (ref 3–14)
ANION GAP SERPL CALCULATED.3IONS-SCNC: 8 MMOL/L (ref 3–14)
BACTERIA SPEC CULT: NO GROWTH
BACTERIA SPEC CULT: NO GROWTH
BASOPHILS # BLD AUTO: 0 10E9/L (ref 0–0.2)
BASOPHILS NFR BLD AUTO: 0 %
BLD GP AB SCN SERPL QL: NORMAL
BLOOD BANK CMNT PATIENT-IMP: NORMAL
BUN SERPL-MCNC: 22 MG/DL (ref 7–30)
BUN SERPL-MCNC: 23 MG/DL (ref 7–30)
CALCIUM SERPL-MCNC: 8.2 MG/DL (ref 8.5–10.1)
CALCIUM SERPL-MCNC: 9 MG/DL (ref 8.5–10.1)
CHLORIDE SERPL-SCNC: 105 MMOL/L (ref 94–109)
CHLORIDE SERPL-SCNC: 108 MMOL/L (ref 94–109)
CO2 SERPL-SCNC: 22 MMOL/L (ref 20–32)
CO2 SERPL-SCNC: 26 MMOL/L (ref 20–32)
CREAT SERPL-MCNC: 0.78 MG/DL (ref 0.52–1.04)
CREAT SERPL-MCNC: 0.83 MG/DL (ref 0.52–1.04)
DIFFERENTIAL METHOD BLD: ABNORMAL
EOSINOPHIL # BLD AUTO: 0.1 10E9/L (ref 0–0.7)
EOSINOPHIL NFR BLD AUTO: 1 %
ERYTHROCYTE [DISTWIDTH] IN BLOOD BY AUTOMATED COUNT: 15.9 % (ref 10–15)
ERYTHROCYTE [DISTWIDTH] IN BLOOD BY AUTOMATED COUNT: 16.3 % (ref 10–15)
GFR SERPL CREATININE-BSD FRML MDRD: 63 ML/MIN/{1.73_M2}
GFR SERPL CREATININE-BSD FRML MDRD: 68 ML/MIN/{1.73_M2}
GLUCOSE SERPL-MCNC: 76 MG/DL (ref 70–99)
GLUCOSE SERPL-MCNC: 80 MG/DL (ref 70–99)
HCT VFR BLD AUTO: 29.2 % (ref 35–47)
HCT VFR BLD AUTO: 31.6 % (ref 35–47)
HGB BLD-MCNC: 10 G/DL (ref 11.7–15.7)
HGB BLD-MCNC: 9 G/DL (ref 11.7–15.7)
INR PPP: 1.18 (ref 0.86–1.14)
INTERPRETATION ECG - MUSE: NORMAL
LYMPHOCYTES # BLD AUTO: 0.1 10E9/L (ref 0.8–5.3)
LYMPHOCYTES NFR BLD AUTO: 1 %
Lab: NORMAL
Lab: NORMAL
MCH RBC QN AUTO: 31.3 PG (ref 26.5–33)
MCH RBC QN AUTO: 31.6 PG (ref 26.5–33)
MCHC RBC AUTO-ENTMCNC: 30.8 G/DL (ref 31.5–36.5)
MCHC RBC AUTO-ENTMCNC: 31.6 G/DL (ref 31.5–36.5)
MCV RBC AUTO: 103 FL (ref 78–100)
MCV RBC AUTO: 99 FL (ref 78–100)
MONOCYTES # BLD AUTO: 0.1 10E9/L (ref 0–1.3)
MONOCYTES NFR BLD AUTO: 1 %
NEUTROPHILS # BLD AUTO: 10.7 10E9/L (ref 1.6–8.3)
NEUTROPHILS NFR BLD AUTO: 97 %
PLATELET # BLD AUTO: 139 10E9/L (ref 150–450)
PLATELET # BLD AUTO: 181 10E9/L (ref 150–450)
PLATELET # BLD EST: ABNORMAL 10*3/UL
POTASSIUM SERPL-SCNC: 3.2 MMOL/L (ref 3.4–5.3)
POTASSIUM SERPL-SCNC: 4.1 MMOL/L (ref 3.4–5.3)
POTASSIUM SERPL-SCNC: 4.1 MMOL/L (ref 3.4–5.3)
RBC # BLD AUTO: 2.85 10E12/L (ref 3.8–5.2)
RBC # BLD AUTO: 3.2 10E12/L (ref 3.8–5.2)
RBC MORPH BLD: ABNORMAL
SODIUM SERPL-SCNC: 138 MMOL/L (ref 133–144)
SODIUM SERPL-SCNC: 138 MMOL/L (ref 133–144)
SPECIMEN EXP DATE BLD: NORMAL
SPECIMEN SOURCE: NORMAL
SPECIMEN SOURCE: NORMAL
WBC # BLD AUTO: 11 10E9/L (ref 4–11)
WBC # BLD AUTO: 29.2 10E9/L (ref 4–11)

## 2019-08-28 PROCEDURE — 96361 HYDRATE IV INFUSION ADD-ON: CPT

## 2019-08-28 PROCEDURE — 80048 BASIC METABOLIC PNL TOTAL CA: CPT | Performed by: INTERNAL MEDICINE

## 2019-08-28 PROCEDURE — 25000128 H RX IP 250 OP 636: Performed by: ORTHOPAEDIC SURGERY

## 2019-08-28 PROCEDURE — 96376 TX/PRO/DX INJ SAME DRUG ADON: CPT

## 2019-08-28 PROCEDURE — 36415 COLL VENOUS BLD VENIPUNCTURE: CPT | Performed by: INTERNAL MEDICINE

## 2019-08-28 PROCEDURE — 37000008 ZZH ANESTHESIA TECHNICAL FEE, 1ST 30 MIN: Performed by: ORTHOPAEDIC SURGERY

## 2019-08-28 PROCEDURE — 80048 BASIC METABOLIC PNL TOTAL CA: CPT | Performed by: EMERGENCY MEDICINE

## 2019-08-28 PROCEDURE — 27210794 ZZH OR GENERAL SUPPLY STERILE: Performed by: ORTHOPAEDIC SURGERY

## 2019-08-28 PROCEDURE — 36000065 ZZH SURGERY LEVEL 4 W FLUORO 1ST 30 MIN: Performed by: ORTHOPAEDIC SURGERY

## 2019-08-28 PROCEDURE — 85610 PROTHROMBIN TIME: CPT | Performed by: INTERNAL MEDICINE

## 2019-08-28 PROCEDURE — 25000128 H RX IP 250 OP 636: Performed by: ANESTHESIOLOGY

## 2019-08-28 PROCEDURE — C1713 ANCHOR/SCREW BN/BN,TIS/BN: HCPCS | Performed by: ORTHOPAEDIC SURGERY

## 2019-08-28 PROCEDURE — 25000132 ZZH RX MED GY IP 250 OP 250 PS 637: Mod: GY | Performed by: ORTHOPAEDIC SURGERY

## 2019-08-28 PROCEDURE — 99223 1ST HOSP IP/OBS HIGH 75: CPT | Mod: AI | Performed by: INTERNAL MEDICINE

## 2019-08-28 PROCEDURE — 86901 BLOOD TYPING SEROLOGIC RH(D): CPT | Performed by: ANESTHESIOLOGY

## 2019-08-28 PROCEDURE — 25000132 ZZH RX MED GY IP 250 OP 250 PS 637: Mod: GY | Performed by: INTERNAL MEDICINE

## 2019-08-28 PROCEDURE — 25000128 H RX IP 250 OP 636: Performed by: INTERNAL MEDICINE

## 2019-08-28 PROCEDURE — 25800030 ZZH RX IP 258 OP 636: Performed by: ORTHOPAEDIC SURGERY

## 2019-08-28 PROCEDURE — 85027 COMPLETE CBC AUTOMATED: CPT | Performed by: INTERNAL MEDICINE

## 2019-08-28 PROCEDURE — 25000128 H RX IP 250 OP 636: Performed by: EMERGENCY MEDICINE

## 2019-08-28 PROCEDURE — 73590 X-RAY EXAM OF LOWER LEG: CPT | Mod: RT

## 2019-08-28 PROCEDURE — 85025 COMPLETE CBC W/AUTO DIFF WBC: CPT | Performed by: EMERGENCY MEDICINE

## 2019-08-28 PROCEDURE — 86850 RBC ANTIBODY SCREEN: CPT | Performed by: ANESTHESIOLOGY

## 2019-08-28 PROCEDURE — 40000277 XR SURGERY CARM FLUORO LESS THAN 5 MIN W STILLS: Mod: TC

## 2019-08-28 PROCEDURE — 96375 TX/PRO/DX INJ NEW DRUG ADDON: CPT

## 2019-08-28 PROCEDURE — 40000306 ZZH STATISTIC PRE PROC ASSESS II: Performed by: ORTHOPAEDIC SURGERY

## 2019-08-28 PROCEDURE — 25800030 ZZH RX IP 258 OP 636: Performed by: INTERNAL MEDICINE

## 2019-08-28 PROCEDURE — 25800030 ZZH RX IP 258 OP 636: Performed by: ANESTHESIOLOGY

## 2019-08-28 PROCEDURE — 0QSG04Z REPOSITION RIGHT TIBIA WITH INTERNAL FIXATION DEVICE, OPEN APPROACH: ICD-10-PCS | Performed by: ORTHOPAEDIC SURGERY

## 2019-08-28 PROCEDURE — 25000128 H RX IP 250 OP 636: Performed by: NURSE ANESTHETIST, CERTIFIED REGISTERED

## 2019-08-28 PROCEDURE — 84132 ASSAY OF SERUM POTASSIUM: CPT | Performed by: INTERNAL MEDICINE

## 2019-08-28 PROCEDURE — 37000009 ZZH ANESTHESIA TECHNICAL FEE, EACH ADDTL 15 MIN: Performed by: ORTHOPAEDIC SURGERY

## 2019-08-28 PROCEDURE — 36000063 ZZH SURGERY LEVEL 4 EA 15 ADDTL MIN: Performed by: ORTHOPAEDIC SURGERY

## 2019-08-28 PROCEDURE — 25000125 ZZHC RX 250: Performed by: NURSE ANESTHETIST, CERTIFIED REGISTERED

## 2019-08-28 PROCEDURE — 96374 THER/PROPH/DIAG INJ IV PUSH: CPT

## 2019-08-28 PROCEDURE — 36415 COLL VENOUS BLD VENIPUNCTURE: CPT | Performed by: ANESTHESIOLOGY

## 2019-08-28 PROCEDURE — 86900 BLOOD TYPING SEROLOGIC ABO: CPT | Performed by: ANESTHESIOLOGY

## 2019-08-28 PROCEDURE — 71000015 ZZH RECOVERY PHASE 1 LEVEL 2 EA ADDTL HR: Performed by: ORTHOPAEDIC SURGERY

## 2019-08-28 PROCEDURE — 12000000 ZZH R&B MED SURG/OB

## 2019-08-28 PROCEDURE — 71000014 ZZH RECOVERY PHASE 1 LEVEL 2 FIRST HR: Performed by: ORTHOPAEDIC SURGERY

## 2019-08-28 PROCEDURE — 25800030 ZZH RX IP 258 OP 636: Performed by: NURSE ANESTHETIST, CERTIFIED REGISTERED

## 2019-08-28 DEVICE — IMPLANTABLE DEVICE: Type: IMPLANTABLE DEVICE | Site: TIBIA | Status: FUNCTIONAL

## 2019-08-28 DEVICE — IMP SCR SYN CORTEX 3.5X60MM SELF TAP SS 204.860: Type: IMPLANTABLE DEVICE | Site: TIBIA | Status: FUNCTIONAL

## 2019-08-28 DEVICE — IMP SCR SYN CORTEX 3.5X26MM SELF TAP SS 204.826: Type: IMPLANTABLE DEVICE | Site: TIBIA | Status: FUNCTIONAL

## 2019-08-28 DEVICE — IMP SCR SYN CORTEX 3.5X30MM SELF TAP SS 204.830: Type: IMPLANTABLE DEVICE | Site: TIBIA | Status: FUNCTIONAL

## 2019-08-28 DEVICE — IMP SCR SYN CORTEX 3.5X28MM SELF TAP SS 204.828: Type: IMPLANTABLE DEVICE | Site: TIBIA | Status: FUNCTIONAL

## 2019-08-28 RX ORDER — SODIUM CHLORIDE 9 MG/ML
INJECTION, SOLUTION INTRAVENOUS CONTINUOUS
Status: DISCONTINUED | OUTPATIENT
Start: 2019-08-28 | End: 2019-08-30 | Stop reason: HOSPADM

## 2019-08-28 RX ORDER — GABAPENTIN 100 MG/1
200 CAPSULE ORAL 2 TIMES DAILY
Status: DISCONTINUED | OUTPATIENT
Start: 2019-08-28 | End: 2019-08-30 | Stop reason: HOSPADM

## 2019-08-28 RX ORDER — LIDOCAINE 40 MG/G
CREAM TOPICAL
Status: DISCONTINUED | OUTPATIENT
Start: 2019-08-28 | End: 2019-08-30 | Stop reason: HOSPADM

## 2019-08-28 RX ORDER — POTASSIUM CL/LIDO/0.9 % NACL 10MEQ/0.1L
10 INTRAVENOUS SOLUTION, PIGGYBACK (ML) INTRAVENOUS
Status: DISCONTINUED | OUTPATIENT
Start: 2019-08-28 | End: 2019-08-30 | Stop reason: HOSPADM

## 2019-08-28 RX ORDER — ONDANSETRON 2 MG/ML
4 INJECTION INTRAMUSCULAR; INTRAVENOUS EVERY 6 HOURS PRN
Status: DISCONTINUED | OUTPATIENT
Start: 2019-08-28 | End: 2019-08-30 | Stop reason: HOSPADM

## 2019-08-28 RX ORDER — HYDROMORPHONE HYDROCHLORIDE 1 MG/ML
.3-.5 INJECTION, SOLUTION INTRAMUSCULAR; INTRAVENOUS; SUBCUTANEOUS EVERY 5 MIN PRN
Status: DISCONTINUED | OUTPATIENT
Start: 2019-08-28 | End: 2019-08-28 | Stop reason: HOSPADM

## 2019-08-28 RX ORDER — ESTRADIOL 0.1 MG/G
2 CREAM VAGINAL
Status: DISCONTINUED | OUTPATIENT
Start: 2019-08-28 | End: 2019-08-30 | Stop reason: HOSPADM

## 2019-08-28 RX ORDER — VANCOMYCIN HYDROCHLORIDE 50 MG/ML
125 KIT ORAL 2 TIMES DAILY
Status: DISCONTINUED | OUTPATIENT
Start: 2019-08-28 | End: 2019-08-30 | Stop reason: HOSPADM

## 2019-08-28 RX ORDER — PROCHLORPERAZINE 25 MG
12.5 SUPPOSITORY, RECTAL RECTAL EVERY 12 HOURS PRN
Status: DISCONTINUED | OUTPATIENT
Start: 2019-08-28 | End: 2019-08-30 | Stop reason: HOSPADM

## 2019-08-28 RX ORDER — ACETAMINOPHEN 500 MG
1000 TABLET ORAL 3 TIMES DAILY
Status: DISCONTINUED | OUTPATIENT
Start: 2019-08-28 | End: 2019-08-30 | Stop reason: HOSPADM

## 2019-08-28 RX ORDER — LIDOCAINE 40 MG/G
CREAM TOPICAL
Status: DISCONTINUED | OUTPATIENT
Start: 2019-08-28 | End: 2019-08-28 | Stop reason: HOSPADM

## 2019-08-28 RX ORDER — VIT A/VIT C/VIT E/ZINC/COPPER 4296-226
1 CAPSULE ORAL 2 TIMES DAILY
Status: DISCONTINUED | OUTPATIENT
Start: 2019-08-28 | End: 2019-08-28 | Stop reason: RX

## 2019-08-28 RX ORDER — METOCLOPRAMIDE 5 MG/1
5 TABLET ORAL EVERY 6 HOURS PRN
Status: DISCONTINUED | OUTPATIENT
Start: 2019-08-28 | End: 2019-08-30 | Stop reason: HOSPADM

## 2019-08-28 RX ORDER — SERTRALINE HYDROCHLORIDE 100 MG/1
100 TABLET, FILM COATED ORAL DAILY
Status: DISCONTINUED | OUTPATIENT
Start: 2019-08-28 | End: 2019-08-30 | Stop reason: HOSPADM

## 2019-08-28 RX ORDER — HYDRALAZINE HYDROCHLORIDE 20 MG/ML
2.5-5 INJECTION INTRAMUSCULAR; INTRAVENOUS EVERY 10 MIN PRN
Status: DISCONTINUED | OUTPATIENT
Start: 2019-08-28 | End: 2019-08-28 | Stop reason: HOSPADM

## 2019-08-28 RX ORDER — POTASSIUM CHLORIDE 1500 MG/1
20-40 TABLET, EXTENDED RELEASE ORAL
Status: DISCONTINUED | OUTPATIENT
Start: 2019-08-28 | End: 2019-08-30 | Stop reason: HOSPADM

## 2019-08-28 RX ORDER — POLYETHYLENE GLYCOL 3350 17 G/17G
17 POWDER, FOR SOLUTION ORAL DAILY PRN
Status: DISCONTINUED | OUTPATIENT
Start: 2019-08-28 | End: 2019-08-30 | Stop reason: HOSPADM

## 2019-08-28 RX ORDER — SODIUM CHLORIDE, SODIUM LACTATE, POTASSIUM CHLORIDE, CALCIUM CHLORIDE 600; 310; 30; 20 MG/100ML; MG/100ML; MG/100ML; MG/100ML
INJECTION, SOLUTION INTRAVENOUS CONTINUOUS
Status: DISCONTINUED | OUTPATIENT
Start: 2019-08-28 | End: 2019-08-28 | Stop reason: HOSPADM

## 2019-08-28 RX ORDER — AMLODIPINE BESYLATE 10 MG/1
10 TABLET ORAL DAILY
Status: DISCONTINUED | OUTPATIENT
Start: 2019-08-29 | End: 2019-08-30 | Stop reason: HOSPADM

## 2019-08-28 RX ORDER — METOPROLOL SUCCINATE 100 MG/1
100 TABLET, EXTENDED RELEASE ORAL DAILY
Status: DISCONTINUED | OUTPATIENT
Start: 2019-08-28 | End: 2019-08-30 | Stop reason: HOSPADM

## 2019-08-28 RX ORDER — FENTANYL CITRATE 50 UG/ML
INJECTION, SOLUTION INTRAMUSCULAR; INTRAVENOUS PRN
Status: DISCONTINUED | OUTPATIENT
Start: 2019-08-28 | End: 2019-08-28

## 2019-08-28 RX ORDER — HYDROMORPHONE HYDROCHLORIDE 1 MG/ML
.2-.4 INJECTION, SOLUTION INTRAMUSCULAR; INTRAVENOUS; SUBCUTANEOUS
Status: DISCONTINUED | OUTPATIENT
Start: 2019-08-28 | End: 2019-08-30 | Stop reason: HOSPADM

## 2019-08-28 RX ORDER — BUPIVACAINE HYDROCHLORIDE 5 MG/ML
INJECTION, SOLUTION PERINEURAL PRN
Status: DISCONTINUED | OUTPATIENT
Start: 2019-08-28 | End: 2019-08-28 | Stop reason: HOSPADM

## 2019-08-28 RX ORDER — GLYCOPYRROLATE 0.2 MG/ML
INJECTION, SOLUTION INTRAMUSCULAR; INTRAVENOUS PRN
Status: DISCONTINUED | OUTPATIENT
Start: 2019-08-28 | End: 2019-08-28

## 2019-08-28 RX ORDER — DEXAMETHASONE SODIUM PHOSPHATE 4 MG/ML
4 INJECTION, SOLUTION INTRA-ARTICULAR; INTRALESIONAL; INTRAMUSCULAR; INTRAVENOUS; SOFT TISSUE
Status: DISCONTINUED | OUTPATIENT
Start: 2019-08-28 | End: 2019-08-28 | Stop reason: HOSPADM

## 2019-08-28 RX ORDER — NALOXONE HYDROCHLORIDE 0.4 MG/ML
.1-.4 INJECTION, SOLUTION INTRAMUSCULAR; INTRAVENOUS; SUBCUTANEOUS
Status: DISCONTINUED | OUTPATIENT
Start: 2019-08-28 | End: 2019-08-28

## 2019-08-28 RX ORDER — PROPOFOL 10 MG/ML
INJECTION, EMULSION INTRAVENOUS PRN
Status: DISCONTINUED | OUTPATIENT
Start: 2019-08-28 | End: 2019-08-28

## 2019-08-28 RX ORDER — ACETAMINOPHEN 325 MG/1
650 TABLET ORAL EVERY 4 HOURS PRN
Status: DISCONTINUED | OUTPATIENT
Start: 2019-08-28 | End: 2019-08-30 | Stop reason: HOSPADM

## 2019-08-28 RX ORDER — NALOXONE HYDROCHLORIDE 0.4 MG/ML
.1-.4 INJECTION, SOLUTION INTRAMUSCULAR; INTRAVENOUS; SUBCUTANEOUS
Status: DISCONTINUED | OUTPATIENT
Start: 2019-08-28 | End: 2019-08-30 | Stop reason: HOSPADM

## 2019-08-28 RX ORDER — POTASSIUM CHLORIDE 7.45 MG/ML
10 INJECTION INTRAVENOUS
Status: DISCONTINUED | OUTPATIENT
Start: 2019-08-28 | End: 2019-08-30 | Stop reason: HOSPADM

## 2019-08-28 RX ORDER — OXYCODONE HYDROCHLORIDE 5 MG/1
5 TABLET ORAL EVERY 4 HOURS PRN
Status: DISCONTINUED | OUTPATIENT
Start: 2019-08-28 | End: 2019-08-30 | Stop reason: HOSPADM

## 2019-08-28 RX ORDER — LIDOCAINE HYDROCHLORIDE 10 MG/ML
INJECTION, SOLUTION INFILTRATION; PERINEURAL PRN
Status: DISCONTINUED | OUTPATIENT
Start: 2019-08-28 | End: 2019-08-28

## 2019-08-28 RX ORDER — ASCORBIC ACID 500 MG
500 TABLET ORAL DAILY
Status: DISCONTINUED | OUTPATIENT
Start: 2019-08-29 | End: 2019-08-30 | Stop reason: HOSPADM

## 2019-08-28 RX ORDER — AMOXICILLIN 250 MG
2 CAPSULE ORAL 2 TIMES DAILY PRN
Status: DISCONTINUED | OUTPATIENT
Start: 2019-08-28 | End: 2019-08-30 | Stop reason: HOSPADM

## 2019-08-28 RX ORDER — EPHEDRINE SULFATE 50 MG/ML
INJECTION, SOLUTION INTRAMUSCULAR; INTRAVENOUS; SUBCUTANEOUS PRN
Status: DISCONTINUED | OUTPATIENT
Start: 2019-08-28 | End: 2019-08-28

## 2019-08-28 RX ORDER — METOCLOPRAMIDE HYDROCHLORIDE 5 MG/ML
5 INJECTION INTRAMUSCULAR; INTRAVENOUS EVERY 6 HOURS PRN
Status: DISCONTINUED | OUTPATIENT
Start: 2019-08-28 | End: 2019-08-30 | Stop reason: HOSPADM

## 2019-08-28 RX ORDER — METOPROLOL TARTRATE 1 MG/ML
1-2 INJECTION, SOLUTION INTRAVENOUS EVERY 5 MIN PRN
Status: DISCONTINUED | OUTPATIENT
Start: 2019-08-28 | End: 2019-08-28 | Stop reason: HOSPADM

## 2019-08-28 RX ORDER — CEFAZOLIN SODIUM 1 G/3ML
1 INJECTION, POWDER, FOR SOLUTION INTRAMUSCULAR; INTRAVENOUS SEE ADMIN INSTRUCTIONS
Status: DISCONTINUED | OUTPATIENT
Start: 2019-08-28 | End: 2019-08-28 | Stop reason: HOSPADM

## 2019-08-28 RX ORDER — ONDANSETRON 4 MG/1
4 TABLET, ORALLY DISINTEGRATING ORAL EVERY 6 HOURS PRN
Status: DISCONTINUED | OUTPATIENT
Start: 2019-08-28 | End: 2019-08-30 | Stop reason: HOSPADM

## 2019-08-28 RX ORDER — MAGNESIUM SULFATE HEPTAHYDRATE 40 MG/ML
4 INJECTION, SOLUTION INTRAVENOUS EVERY 4 HOURS PRN
Status: DISCONTINUED | OUTPATIENT
Start: 2019-08-28 | End: 2019-08-30 | Stop reason: HOSPADM

## 2019-08-28 RX ORDER — LIDOCAINE 4 G/G
1 PATCH TOPICAL
Status: DISCONTINUED | OUTPATIENT
Start: 2019-08-28 | End: 2019-08-30 | Stop reason: HOSPADM

## 2019-08-28 RX ORDER — MEPERIDINE HYDROCHLORIDE 25 MG/ML
12.5 INJECTION INTRAMUSCULAR; INTRAVENOUS; SUBCUTANEOUS EVERY 5 MIN PRN
Status: DISCONTINUED | OUTPATIENT
Start: 2019-08-28 | End: 2019-08-28 | Stop reason: HOSPADM

## 2019-08-28 RX ORDER — ONDANSETRON 2 MG/ML
4 INJECTION INTRAMUSCULAR; INTRAVENOUS EVERY 30 MIN PRN
Status: DISCONTINUED | OUTPATIENT
Start: 2019-08-28 | End: 2019-08-28 | Stop reason: HOSPADM

## 2019-08-28 RX ORDER — CEFAZOLIN SODIUM 1 G/50ML
1 INJECTION, SOLUTION INTRAVENOUS EVERY 8 HOURS
Status: COMPLETED | OUTPATIENT
Start: 2019-08-28 | End: 2019-08-29

## 2019-08-28 RX ORDER — NEOSTIGMINE METHYLSULFATE 1 MG/ML
VIAL (ML) INJECTION PRN
Status: DISCONTINUED | OUTPATIENT
Start: 2019-08-28 | End: 2019-08-28

## 2019-08-28 RX ORDER — DEXAMETHASONE SODIUM PHOSPHATE 4 MG/ML
INJECTION, SOLUTION INTRA-ARTICULAR; INTRALESIONAL; INTRAMUSCULAR; INTRAVENOUS; SOFT TISSUE PRN
Status: DISCONTINUED | OUTPATIENT
Start: 2019-08-28 | End: 2019-08-28

## 2019-08-28 RX ORDER — ONDANSETRON 4 MG/1
4 TABLET, ORALLY DISINTEGRATING ORAL EVERY 30 MIN PRN
Status: DISCONTINUED | OUTPATIENT
Start: 2019-08-28 | End: 2019-08-28 | Stop reason: HOSPADM

## 2019-08-28 RX ORDER — CALCIUM CARBONATE 500(1250)
500 TABLET ORAL 2 TIMES DAILY
Status: DISCONTINUED | OUTPATIENT
Start: 2019-08-28 | End: 2019-08-30 | Stop reason: HOSPADM

## 2019-08-28 RX ORDER — POTASSIUM CHLORIDE 1.5 G/1.58G
20-40 POWDER, FOR SOLUTION ORAL
Status: DISCONTINUED | OUTPATIENT
Start: 2019-08-28 | End: 2019-08-30 | Stop reason: HOSPADM

## 2019-08-28 RX ORDER — ISOSORBIDE DINITRATE 10 MG/1
10 TABLET ORAL 3 TIMES DAILY
Status: DISCONTINUED | OUTPATIENT
Start: 2019-08-28 | End: 2019-08-30 | Stop reason: HOSPADM

## 2019-08-28 RX ORDER — CHLORAL HYDRATE 500 MG
1 CAPSULE ORAL DAILY
Status: DISCONTINUED | OUTPATIENT
Start: 2019-08-29 | End: 2019-08-30 | Stop reason: HOSPADM

## 2019-08-28 RX ORDER — PROCHLORPERAZINE MALEATE 5 MG
5 TABLET ORAL EVERY 6 HOURS PRN
Status: DISCONTINUED | OUTPATIENT
Start: 2019-08-28 | End: 2019-08-30 | Stop reason: HOSPADM

## 2019-08-28 RX ORDER — FENTANYL CITRATE 50 UG/ML
25-50 INJECTION, SOLUTION INTRAMUSCULAR; INTRAVENOUS
Status: DISCONTINUED | OUTPATIENT
Start: 2019-08-28 | End: 2019-08-28 | Stop reason: HOSPADM

## 2019-08-28 RX ORDER — DIMENHYDRINATE 50 MG/ML
25 INJECTION, SOLUTION INTRAMUSCULAR; INTRAVENOUS
Status: DISCONTINUED | OUTPATIENT
Start: 2019-08-28 | End: 2019-08-28 | Stop reason: HOSPADM

## 2019-08-28 RX ORDER — AMOXICILLIN 250 MG
1 CAPSULE ORAL 2 TIMES DAILY PRN
Status: DISCONTINUED | OUTPATIENT
Start: 2019-08-28 | End: 2019-08-30 | Stop reason: HOSPADM

## 2019-08-28 RX ORDER — POTASSIUM CHLORIDE 29.8 MG/ML
20 INJECTION INTRAVENOUS
Status: DISCONTINUED | OUTPATIENT
Start: 2019-08-28 | End: 2019-08-30 | Stop reason: HOSPADM

## 2019-08-28 RX ORDER — CEFAZOLIN SODIUM 2 G/100ML
2 INJECTION, SOLUTION INTRAVENOUS
Status: COMPLETED | OUTPATIENT
Start: 2019-08-28 | End: 2019-08-28

## 2019-08-28 RX ORDER — CLONIDINE HYDROCHLORIDE 0.1 MG/1
0.1 TABLET ORAL 2 TIMES DAILY
Status: DISCONTINUED | OUTPATIENT
Start: 2019-08-29 | End: 2019-08-30 | Stop reason: HOSPADM

## 2019-08-28 RX ADMIN — HYDROMORPHONE HYDROCHLORIDE 0.5 MG: 10 INJECTION, SOLUTION INTRAMUSCULAR; INTRAVENOUS; SUBCUTANEOUS at 11:57

## 2019-08-28 RX ADMIN — PHENYLEPHRINE HYDROCHLORIDE 100 MCG: 10 INJECTION INTRAVENOUS at 11:41

## 2019-08-28 RX ADMIN — ROCURONIUM BROMIDE 40 MG: 10 INJECTION INTRAVENOUS at 10:48

## 2019-08-28 RX ADMIN — PHENYLEPHRINE HYDROCHLORIDE 150 MCG: 10 INJECTION INTRAVENOUS at 10:55

## 2019-08-28 RX ADMIN — Medication 5 MG: at 10:55

## 2019-08-28 RX ADMIN — POTASSIUM CHLORIDE 40 MEQ: 1500 TABLET, EXTENDED RELEASE ORAL at 03:24

## 2019-08-28 RX ADMIN — MORPHINE SULFATE 4 MG: 4 INJECTION INTRAVENOUS at 00:10

## 2019-08-28 RX ADMIN — VANCOMYCIN HYDROCHLORIDE 125 MG: KIT at 20:33

## 2019-08-28 RX ADMIN — HYDROMORPHONE HYDROCHLORIDE 0.25 MG: 10 INJECTION, SOLUTION INTRAMUSCULAR; INTRAVENOUS; SUBCUTANEOUS at 11:52

## 2019-08-28 RX ADMIN — ONDANSETRON 4 MG: 2 INJECTION INTRAMUSCULAR; INTRAVENOUS at 00:11

## 2019-08-28 RX ADMIN — SODIUM CHLORIDE 1000 ML: 9 INJECTION, SOLUTION INTRAVENOUS at 00:11

## 2019-08-28 RX ADMIN — PROPOFOL 120 MG: 10 INJECTION, EMULSION INTRAVENOUS at 10:48

## 2019-08-28 RX ADMIN — SODIUM CHLORIDE: 9 INJECTION, SOLUTION INTRAVENOUS at 02:18

## 2019-08-28 RX ADMIN — OXYCODONE HYDROCHLORIDE 5 MG: 5 TABLET ORAL at 20:25

## 2019-08-28 RX ADMIN — SODIUM CHLORIDE: 9 INJECTION, SOLUTION INTRAVENOUS at 16:36

## 2019-08-28 RX ADMIN — ACETAMINOPHEN 1000 MG: 500 TABLET, FILM COATED ORAL at 20:13

## 2019-08-28 RX ADMIN — Medication 3 MG: at 11:53

## 2019-08-28 RX ADMIN — HYDROMORPHONE HYDROCHLORIDE 0.2 MG: 10 INJECTION, SOLUTION INTRAMUSCULAR; INTRAVENOUS; SUBCUTANEOUS at 18:27

## 2019-08-28 RX ADMIN — MORPHINE SULFATE 4 MG: 4 INJECTION INTRAVENOUS at 00:49

## 2019-08-28 RX ADMIN — PHENYLEPHRINE HYDROCHLORIDE 100 MCG: 10 INJECTION INTRAVENOUS at 10:50

## 2019-08-28 RX ADMIN — ACETAMINOPHEN 1000 MG: 500 TABLET, FILM COATED ORAL at 14:56

## 2019-08-28 RX ADMIN — CEFAZOLIN SODIUM 2 G: 2 INJECTION, SOLUTION INTRAVENOUS at 10:54

## 2019-08-28 RX ADMIN — OXYCODONE HYDROCHLORIDE 5 MG: 5 TABLET ORAL at 02:36

## 2019-08-28 RX ADMIN — DEXAMETHASONE SODIUM PHOSPHATE 4 MG: 4 INJECTION, SOLUTION INTRA-ARTICULAR; INTRALESIONAL; INTRAMUSCULAR; INTRAVENOUS; SOFT TISSUE at 10:48

## 2019-08-28 RX ADMIN — ISOSORBIDE DINITRATE 10 MG: 10 TABLET ORAL at 20:25

## 2019-08-28 RX ADMIN — FENTANYL CITRATE 50 MCG: 50 INJECTION, SOLUTION INTRAMUSCULAR; INTRAVENOUS at 12:28

## 2019-08-28 RX ADMIN — SODIUM CHLORIDE, POTASSIUM CHLORIDE, SODIUM LACTATE AND CALCIUM CHLORIDE: 600; 310; 30; 20 INJECTION, SOLUTION INTRAVENOUS at 10:40

## 2019-08-28 RX ADMIN — PHENYLEPHRINE HYDROCHLORIDE 150 MCG: 10 INJECTION INTRAVENOUS at 10:52

## 2019-08-28 RX ADMIN — GLYCOPYRROLATE 0.2 MG: 0.2 INJECTION, SOLUTION INTRAMUSCULAR; INTRAVENOUS at 10:48

## 2019-08-28 RX ADMIN — LIDOCAINE HYDROCHLORIDE 50 MG: 10 INJECTION, SOLUTION INFILTRATION; PERINEURAL at 10:48

## 2019-08-28 RX ADMIN — POTASSIUM CHLORIDE 20 MEQ: 1500 TABLET, EXTENDED RELEASE ORAL at 05:28

## 2019-08-28 RX ADMIN — GLYCOPYRROLATE 0.4 MG: 0.2 INJECTION, SOLUTION INTRAMUSCULAR; INTRAVENOUS at 11:53

## 2019-08-28 RX ADMIN — SODIUM CHLORIDE, POTASSIUM CHLORIDE, SODIUM LACTATE AND CALCIUM CHLORIDE: 600; 310; 30; 20 INJECTION, SOLUTION INTRAVENOUS at 12:10

## 2019-08-28 RX ADMIN — GABAPENTIN 200 MG: 100 CAPSULE ORAL at 20:14

## 2019-08-28 RX ADMIN — ONDANSETRON 4 MG: 2 INJECTION INTRAMUSCULAR; INTRAVENOUS at 11:44

## 2019-08-28 RX ADMIN — HYDROMORPHONE HYDROCHLORIDE 0.25 MG: 10 INJECTION, SOLUTION INTRAMUSCULAR; INTRAVENOUS; SUBCUTANEOUS at 11:38

## 2019-08-28 RX ADMIN — ASPIRIN 325 MG: 325 TABLET, DELAYED RELEASE ORAL at 16:42

## 2019-08-28 RX ADMIN — FENTANYL CITRATE 100 MCG: 50 INJECTION, SOLUTION INTRAMUSCULAR; INTRAVENOUS at 10:48

## 2019-08-28 RX ADMIN — OXYCODONE HYDROCHLORIDE 5 MG: 5 TABLET ORAL at 16:42

## 2019-08-28 RX ADMIN — SERTRALINE HYDROCHLORIDE 100 MG: 100 TABLET ORAL at 14:56

## 2019-08-28 RX ADMIN — VANCOMYCIN HYDROCHLORIDE 125 MG: KIT at 15:00

## 2019-08-28 RX ADMIN — CEFAZOLIN SODIUM 1 G: 1 INJECTION, SOLUTION INTRAVENOUS at 18:29

## 2019-08-28 ASSESSMENT — ACTIVITIES OF DAILY LIVING (ADL)
SWALLOWING: 0-->SWALLOWS FOODS/LIQUIDS WITHOUT DIFFICULTY
NUMBER_OF_TIMES_PATIENT_HAS_FALLEN_WITHIN_LAST_SIX_MONTHS: 4
ADLS_ACUITY_SCORE: 14
ADLS_ACUITY_SCORE: 15
RETIRED_COMMUNICATION: 0-->UNDERSTANDS/COMMUNICATES WITHOUT DIFFICULTY
TRANSFERRING: 1-->ASSISTIVE EQUIPMENT
AMBULATION: 1-->ASSISTIVE EQUIPMENT
TOILETING: 0-->INDEPENDENT
COGNITION: 0 - NO COGNITION ISSUES REPORTED
ADLS_ACUITY_SCORE: 15
WHICH_OF_THE_ABOVE_FUNCTIONAL_RISKS_HAD_A_RECENT_ONSET_OR_CHANGE?: AMBULATION
DRESS: 0-->INDEPENDENT
ADLS_ACUITY_SCORE: 15
RETIRED_EATING: 0-->INDEPENDENT
PRIOR_FUNCTIONAL_LEVEL_COMMENT: INDEPENDENT WITH WALKER
FALL_HISTORY_WITHIN_LAST_SIX_MONTHS: YES
BATHING: 0-->INDEPENDENT

## 2019-08-28 ASSESSMENT — ENCOUNTER SYMPTOMS
NECK PAIN: 0
HEADACHES: 0
MYALGIAS: 1

## 2019-08-28 ASSESSMENT — MIFFLIN-ST. JEOR: SCORE: 929.84

## 2019-08-28 NOTE — PROGRESS NOTES
Infection Prevention:    Patient requires Contact precautions because of ESBL: urine, 2019. Please contact Infection Prevention with any questions/concerns at *[phone].    Annette Fatima, ICP

## 2019-08-28 NOTE — ANESTHESIA POSTPROCEDURE EVALUATION
Patient: Daya Ignacio    Procedure(s):  OPEN REDUCTION INTERNAL FIXATION PROXIMAL TIBIAL FRACTURE    Diagnosis:tibial plateau fracture  Diagnosis Additional Information: No value filed.    Anesthesia Type:  General, ETT    Note:  Anesthesia Post Evaluation    Patient location during evaluation: PACU  Patient participation: Able to fully participate in evaluation  Level of consciousness: awake and alert  Pain management: adequate  Airway patency: patent  Cardiovascular status: acceptable  Respiratory status: acceptable  Hydration status: acceptable  PONV: controlled     Anesthetic complications: None          Last vitals:  Vitals:    08/28/19 1420 08/28/19 1450 08/28/19 1505   BP: (!) 105/37 (!) 97/37 119/41   Pulse:      Resp:  12 14   Temp:      SpO2:  100% 98%         Electronically Signed By: Harry Dove MD  August 28, 2019  4:19 PM

## 2019-08-28 NOTE — ED PROVIDER NOTES
History     Chief Complaint:  Fall    HPI   Daya Ignacio is a 88 year old female with a history of chronic low back pain and recurrent UTI who presents with right leg pain after a fall.  The patient was in the ED on 8/21 for a ESBL UTI and was admitted for treatment. The patient was released earlier today. C-diff + on PO vancomycin as well.  The patient returns to the ED after having a fall at home with complaints of right leg pain. The patient says that she does not recall why she fell. She denies hitting her head, or and headache or neck pain. The patient's family notes that the patient has been in and out of the hospital since Memorial day for UTI treatment.     Allergies:  Atorvastatin  Macrobid  Sulfa drugs      Medications:    Norvasc  Catapres  Estrace  Gabapentin  Fish oil  Isordil   Lidocare  Imodium  Toprol  Prilosec  Zoloft  Firvanq      Past Medical History:    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  Clostridium difficile colitis  Hypertension  Lumbar compression fracture  Macular degeneration (senile) of retina  Upper GI bleed  Urinary tract infection due to extended-spectrum beta lactamase producing Klebsiella   Osteoporosis  Sciatica     Past Surgical History:    Cataract removal NOS  Cystoscopy  EGD  Hip pinning procedure  Hip replacement  Tonsillectomy, adenoidectomy, combined     Family History:    Alzheimer disease  Cardiovascular   Parkinson's     Social History:  The patient was accompanied to the ED by family.  Smoking Status: Never Smoker  Smokeless Tobacco: Never Used  Alcohol Use: Negative  Drug Use: Negative  PCP: Marcy Soares   Marital Status:  Single       Review of Systems   Musculoskeletal: Positive for myalgias. Negative for neck pain.   Neurological: Negative for headaches.   All other systems reviewed and are negative.      Physical Exam     Patient Vitals for the past 24 hrs:   BP Temp Temp src Pulse Resp SpO2 Weight   08/28/19 0100 107/48 -- -- 73 16 96 % --   08/28/19  0045 109/50 -- -- 74 -- 96 % --   08/27/19 2353 102/51 98.2  F (36.8  C) Oral 70 16 97 % 56.2 kg (124 lb)        Physical Exam  Nursing note and vitals reviewed.  Constitutional: Cooperative.   HENT:   Mouth/Throat: Mucous membranes are dry. Full ROM of neck, no SP tenderness.   Eyes: Pupils are equal, round, and reactive to light. EOMI  Cardiovascular: Normal rate, regular rhythm and normal heart sounds.  No murmur. 2+ right DP pulse.   Pulmonary/Chest: Effort normal and breath sounds normal. No respiratory distress. No wheezes. No rales.   Abdominal: Soft. Normal appearance and bowel sounds are normal. No distension. There is no tenderness. There is no rigidity and no guarding.   Musculoskeletal: Normal range of motion of UE's and LLE.  Large soft tissue swelling to right proximal tibia.  Neurological: Alert. Oriented x3.  Sensation intact in distal RLE.   Skin: Skin is warm and dry.   Psychiatric: Normal mood and affect.      Emergency Department Course     ECG:  ECG taken at 0015, ECG read at 0017  Normal sinus rhythm  Left axis deviation  Rate 71 bpm. OK interval 172 ms. QRS duration 88 ms. QT/QTc 444/482 ms. P-R-T axes 84 -39 28.    Imaging:  Radiology findings were communicated with the patient who voiced understanding of the findings.    XR Tibia & Fibula Right 2 Views  Mildly impacted, comminuted, and posteriorly angulated fracture of the proximal tibia. There is a mildly comminuted impacted fracture of the proximal right fibula.  Reading per radiology    Laboratory:  Laboratory findings were communicated with the patient who voiced understanding of the findings.    CBC: WBC 11.0, HGB 10.0 (L),  (L)  BMP: potassium 3.2 (L) o/w WNL (Creatinine 0.78)    Procedure:    Long leg posterior slab Splint Placement     Splint was applied to the right leg and after placement I checked and adjusted the fit to ensure proper positioning. Patient was more comfortable with splint in place. Sensation and circulation  are intact after splint placement.    Interventions:  0010 morphine 4 mg IV  0011 Zofran 4 mg IV  0011 NS 1000 mL IV  0049 morphine 4 mg IV    Emergency Department Course:    2349 Nursing notes and vitals reviewed.    2350 I performed an exam of the patient as documented above.     0004 IV was inserted and blood was drawn for laboratory testing, results above.     0019 The patient was sent for a XR while in the emergency department, results above.      0045 Patient rechecked and updated.      0053 I spoke with Dr. Pereira of the hospitalist service regarding patient's presentation, findings, and plan of care.     0057 I performed the splint placement procedure as documented above.     0104 The patient is admitted into the care of Dr. Pereira.     Impression & Plan      Medical Decision Making:  Daya Ignacio is a 88 year old female recently admitted for C.Diff colitis (still on PO Vanco) as well as VRE urine infection who presents to the emergency department today for evaluation after a fall. Review of her chart she has had multiple recurrent falls and unfortunately has sustained a fracture to her proximal tibia and fibula on the right side. Distally she is neurovascularly intact without concern for compartment syndrome. She has been splinted as above. Basic labs were ordered and reassuring as was her EKG. She will be admitted in stable condition to the hospitalist service  with orthopedics following.       Diagnosis:    ICD-10-CM    1. Fall, initial encounter W19.XXXA    2. Tibia/fibula fracture, right, closed, initial encounter S82.201A     S82.401A    3. C. difficile colitis A04.72      Disposition:   Findings and plan explained to the Patient and family who consents to admission. Discussed the patient with Dr. Pereira, who will admit the patient to a med/surg bed for further monitoring, evaluation, and treatment.     Scribe Disclosure:  Efrem LYNN, am serving as a scribe at 11:52 PM on 8/27/2019 to  document services personally performed by German Gold MD based on my observations and the provider's statements to me.      [facility] EMERGENCY DEPARTMENT       German Gold MD  08/28/19 0114

## 2019-08-28 NOTE — ANESTHESIA PREPROCEDURE EVALUATION
Anesthesia Pre-Procedure Evaluation    Patient: Daya Ignacio   MRN: [id number] : 10/11/1930          Preoperative Diagnosis: tibial plateau fracture    Procedure(s):  OPEN REDUCTION INTERNAL FIXATION, FRACTURE, TIBIA, PLATEAU    Past Medical History:   Diagnosis Date     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 2014     h/o Clostridium difficile colitis 2019     Hypertension      Lumbar compression fracture (H)      Macular degeneration (senile) of retina, unspecified      Upper GI bleed 2019     Urinary tract infection due to extended-spectrum beta lactamase (ESBL)-producing Klebsiella 2019    resistant to Bactrim     Past Surgical History:   Procedure Laterality Date     Cataract removal NOS Bilateral      CYSTOSCOPY N/A 2019    Procedure: Exam under anesthesia, video cystopanendoscopy;  Surgeon: Dennis Carlson MD;  Location:  OR     ESOPHAGOSCOPY, GASTROSCOPY, DUODENOSCOPY (EGD), COMBINED N/A 2019    Procedure: ESOPHAGOGASTRODUODENOSCOPY (EGD);  Surgeon: Gaurav Degroot MD;  Location:  GI     Hip Pinning procedure Left      Hip replacement NOS Right      TONSILLECTOMY, ADENOIDECTOMY, COMBINED       Anesthesia Evaluation     . Pt has had prior anesthetic. Type: General           ROS/MED HX    ENT/Pulmonary:  - neg pulmonary ROS     Neurologic:  - neg neurologic ROS     Cardiovascular:     (+) hypertension----. : . . . :. .       METS/Exercise Tolerance:     Hematologic:  - neg hematologic  ROS       Musculoskeletal:   (+) fracture lower extremity: Femoral, -       GI/Hepatic:     (+) Other GI/Hepatic Upper GI bleed      Renal/Genitourinary:     (+) chronic renal disease, Other Renal/ Genitourinary, UTI      Endo:  - neg endo ROS       Psychiatric:  - neg psychiatric ROS       Infectious Disease:   (+) Other Infectious Disease h/o Clostridium difficile colitis      Malignancy:      - no malignancy   Other:    (+) No chance of pregnancy C-spine cleared: N/A, no H/O Chronic  "Pain,no other significant disability   - neg other ROS                      Physical Exam  Normal systems: cardiovascular, pulmonary and dental    Airway   Mallampati: II  TM distance: >3 FB  Neck ROM: full    Dental     Cardiovascular       Pulmonary             Lab Results   Component Value Date    WBC 29.2 (H) 08/28/2019    HGB 9.0 (L) 08/28/2019    HCT 29.2 (L) 08/28/2019     08/28/2019    CRP 1.1 07/09/2007    SED 10 07/09/2007     08/28/2019    POTASSIUM 4.1 08/28/2019    POTASSIUM 4.1 08/28/2019    CHLORIDE 108 08/28/2019    CO2 22 08/28/2019    BUN 22 08/28/2019    CR 0.83 08/28/2019    GLC 76 08/28/2019    DAVE 8.2 (L) 08/28/2019    PHOS 5.5 (H) 07/15/2019    MAG 1.8 08/26/2019    ALBUMIN 3.0 (L) 08/21/2019    PROTTOTAL 6.7 (L) 08/21/2019    ALT 52 (H) 08/21/2019    AST 71 (H) 08/21/2019    ALKPHOS 96 08/21/2019    BILITOTAL 2.2 (H) 08/21/2019    LIPASE 41 (L) 05/26/2019    AMYLASE 361 (H) 06/25/2011    PTT 29 06/23/2011    INR 1.18 (H) 08/28/2019    FIBR 177 (L) 06/23/2011    TSH 0.91 08/23/2013       Preop Vitals  BP Readings from Last 3 Encounters:   08/28/19 107/47   08/27/19 (!) 153/65   08/15/19 131/58    Pulse Readings from Last 3 Encounters:   08/28/19 73   08/24/19 89   08/15/19 73      Resp Readings from Last 3 Encounters:   08/28/19 16   08/27/19 18   08/15/19 18    SpO2 Readings from Last 3 Encounters:   08/28/19 99%   08/27/19 97%   08/15/19 94%      Temp Readings from Last 1 Encounters:   08/28/19 97.3  F (36.3  C) (Temporal)    Ht Readings from Last 1 Encounters:   08/28/19 1.549 m (5' 1\")      Wt Readings from Last 1 Encounters:   08/28/19 56.2 kg (124 lb)    Estimated body mass index is 23.43 kg/m  as calculated from the following:    Height as of this encounter: 1.549 m (5' 1\").    Weight as of this encounter: 56.2 kg (124 lb).       Anesthesia Plan      History & Physical Review  History and physical reviewed and following examination; no interval change.    ASA Status:  3 .  "   NPO Status:  > 8 hours    Plan for General and ETT with Intravenous induction. Maintenance will be Balanced.    PONV prophylaxis:  Ondansetron (or other 5HT-3) and Dexamethasone or Solumedrol       Postoperative Care  Postoperative pain management:  IV analgesics.      Consents  Anesthetic plan, risks, benefits and alternatives discussed with: .  Use of blood products discussed: Yes.   Use of blood products discussed with Patient. Consented to blood products.  .                 Harry Dove MD                    .

## 2019-08-28 NOTE — ANESTHESIA CARE TRANSFER NOTE
Patient: Daya Ignacio    Procedure(s):  OPEN REDUCTION INTERNAL FIXATION PROXIMAL TIBIAL FRACTURE    Diagnosis: tibial plateau fracture  Diagnosis Additional Information: No value filed.    Anesthesia Type:   General, ETT     Note:  Airway :Face Mask  Patient transferred to:PACU  Comments: Patient oral suctioned. Patient with spontaneous respirations and adequate tidal volumes. Patient awake and responsive. Extubated in OR to 8 L face tent. To PACU ventilating well. VSS. Report given.Handoff Report: Identifed the Patient, Identified the Reponsible Provider, Reviewed the pertinent medical history, Discussed the surgical course, Reviewed Intra-OP anesthesia mangement and issues during anesthesia, Set expectations for post-procedure period and Allowed opportunity for questions and acknowledgement of understanding      Vitals: (Last set prior to Anesthesia Care Transfer)    CRNA VITALS  8/28/2019 1146 - 8/28/2019 1223      8/28/2019             NIBP:  125/46    Pulse:  99    NIBP Mean:  91    SpO2:  96 %                Electronically Signed By: KARTHIK May CRNA  August 28, 2019  12:23 PM

## 2019-08-28 NOTE — PROGRESS NOTES
Report called to ROBERTA Stephenson in pre-op.  Left message with son, Gael to inform that pt was having surgery today. Spoke with Lizzy, daughter-in-law and is aware pt will likely have surgery today.  Would like to speak with surgeon.  Spoke with Seema to request Dr. Pardo call Lizzy to discuss the upcoming surgery.

## 2019-08-28 NOTE — PROGRESS NOTES
Dr. Diaz assessed pt and also called Lizzy to update and discuss her questions/concerns.  Pt transferred via cart to pre-op.

## 2019-08-28 NOTE — CONSULTS
Brief Ortho Consult Note    89 y/o F s/p mechanical fall with R proximal tibia and fibula fractures, no obvious intra-articular extension.  With moderate displacement and angulation, will require ORIF.  Patient appears clear for OR this morning.  Will evaluate in pre-op for formal consult.

## 2019-08-28 NOTE — CONSULTS
[facility]  Orthopaedics/Foot and Ankle Surgery Consultation         Molina Pardo MD    Daya Ignacio MRN# [id number]   YOB: 1930 Age: 88 year old      Date of Admission:  8/27/2019  Date of Consult: 08/28/2019           Assessment and Plan:   88-year-old female status post mechanical fall with right mildly displaced and angulated metaphyseal proximal tibia fracture with associated proximal fibula fracture.  No obvious intra-articular extension noted.  Operative intervention is recommended for this fracture pattern to restore stable alignment of the proximal tibia and allow for fracture healing.  This will also help promote early mobilization of the knee though the patient will remain nonweightbearing for approximately 6 weeks postoperatively.  Benefits and risks of surgery were reviewed in full with the patient and her daughter at bedside today.  We will proceed to the operating room this morning for ORIF of the proximal tibia fracture.  Further postoperative recommendations to follow.  Hospitalist comanagement is appreciated.  Anticipate the patient will require TCU stay following her hospitalization.            Code Status:   DNR / DNI         Primary Care Physician:   Marcy Soares N [phone]         Requesting Physician:      Dr. Pereira         Chief Complaint:   R tibia and fibula fractures    History is obtained from the patient and medical chart.         History of Present Illness:   Daya Ignacio is a 88 year old female with fairly complex past medical history including history of previous MI, hypertension, osteoporosis, and current C. difficile infection who presented to the emergency department last evening after sustaining a mechanical fall in her kitchen.  The patient does not recall the exact episode surrounding the fall and it is believe she may have sustained an orthostatic episode.  The patient does not report striking her head associated with the  fall.  The patient has noted persistent pain and difficulty bearing weight on the right leg following the injury.  The patient notes mild aching discomfort in the right shoulder but denies any other musculoskeletal pain.  The patient presented to the emergency department given her inability to bear weight on the right leg where radiographs demonstrated mildly displaced and angulated fractures of the right proximal tibia and fibula.  The patient was placed into a long-leg splint.  Pain has been relatively well controlled overnight with medication management and splint immobilization.  The patient denies any prior history of significant right knee problems.  The patient denies any burning, numbness, or tingling distally in the right foot since the injury.  The patient currently utilizes a cane for assistance with mobilization.  The patient lives in an independent living apartment.           Past Medical History:     Patient Active Problem List   Diagnosis     Generalized anxiety disorder     Osteoporosis     Sciatica     HYPERLIPIDEMIA LDL GOAL <130     Hip fracture (H)     Advanced directives, counseling/discussion     Vitamin D deficiency     HTN (hypertension)     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     Hypertension goal BP (blood pressure) < 140/90     Chronic pain     NSTEMI (non-ST elevated myocardial infarction) (H)     Urinary tract infection without hematuria     Hip pain     Trochanteric fracture of left femur (H)     Diarrhea     Fall     Elevated troponin     Hypokalemia     UTI (urinary tract infection)     Back pain     Urinary tract infection due to extended-spectrum beta lactamase (ESBL)-producing Klebsiella     Upper GI bleed     Lumbar compression fracture (H)     Hypertension     Clostridium difficile colitis     Urinary tract infection     Urinary tract infection due to extended-spectrum beta lactamase (ESBL) producing Escherichia coli     AYAKA (acute kidney injury) (H)     Generalized  weakness     Sepsis secondary to UTI (H)     Sepsis (H)     Closed fracture of proximal end of right tibia and fibula      Past Medical History:   Diagnosis Date     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 4/16/2014     h/o Clostridium difficile colitis 06/2019     Hypertension      Lumbar compression fracture (H)      Macular degeneration (senile) of retina, unspecified      Upper GI bleed 06/2019     Urinary tract infection due to extended-spectrum beta lactamase (ESBL)-producing Klebsiella 05/2019    resistant to Bactrim             Past Surgical History:     Past Surgical History:   Procedure Laterality Date     Cataract removal NOS Bilateral      CYSTOSCOPY N/A 8/1/2019    Procedure: Exam under anesthesia, video cystopanendoscopy;  Surgeon: Dennis Carlson MD;  Location:  OR     ESOPHAGOSCOPY, GASTROSCOPY, DUODENOSCOPY (EGD), COMBINED N/A 6/11/2019    Procedure: ESOPHAGOGASTRODUODENOSCOPY (EGD);  Surgeon: Gaurav Degroot MD;  Location:  GI     Hip Pinning procedure Left      Hip replacement NOS Right      TONSILLECTOMY, ADENOIDECTOMY, COMBINED              Home Medications:     Prior to Admission medications    Medication Sig Last Dose Taking? Auth Provider   amLODIPine (NORVASC) 10 MG tablet Take 1 tablet (10 mg) by mouth daily 8/27/2019 at am Yes Marcy Soares PA-C   Calcium Carbonate (CALCIUM 600 PO) Take 1 tablet by mouth 2 times daily  Past Month at Unknown time Yes Reported, Patient   Cholecalciferol (VITAMIN D3) 2000 UNITS CAPS Take 2,000 Units by mouth daily (with dinner) Past Month at Unknown time Yes Unknown, Entered By History   cloNIDine (CATAPRES) 0.1 MG tablet TAKE ONE TABLET BY MOUTH TWO TIMES A DAY 8/27/2019 at am Yes Marcy Soares PA-C   estradiol (ESTRACE) 0.1 MG/GM vaginal cream Place 2 g vaginally three times a week paraben-free Past Month at Unknown time Yes Marcy Soares PA-C   fish oil-omega-3 fatty acids 1000 MG capsule Take 1 g by mouth daily 8/27/2019 at am  Yes Unknown, Entered By History   gabapentin (NEURONTIN) 100 MG capsule Take 2 capsules (200 mg) by mouth 2 times daily 8/27/2019 at am Yes Amanda Kenyon MD   isosorbide dinitrate (ISORDIL) 10 MG tablet Take 1 tablet (10 mg) by mouth 3 times daily 8/27/2019 at am Yes Marcy Soares PA-C   loperamide (IMODIUM) 2 MG capsule Take 1 capsule (2 mg) by mouth 4 times daily as needed for diarrhea Past Month at Unknown time Yes Amanda Kenyon MD   metoprolol succinate ER (TOPROL-XL) 100 MG 24 hr tablet Take 1 tablet (100 mg) by mouth daily 8/27/2019 at am Yes Marcy Soares PA-C   Multiple Vitamins-Minerals (PRESERVISION AREDS) CAPS Take 1 capsule by mouth 2 times daily  8/27/2019 at am Yes Unknown, Entered By History   omeprazole (PRILOSEC) 20 MG DR capsule Take 1 capsule (20 mg) by mouth every morning (before breakfast) 8/27/2019 at am Yes Fany Dos Santos MD   sertraline (ZOLOFT) 100 MG tablet Take 1 tablet (100 mg) by mouth daily 8/27/2019 at am Yes Marcy Soares PA-C   vancomycin (FIRVANQ) 50 MG/ML oral solution Take 2.5 mLs (125 mg) by mouth 2 times daily for 7 days 8/27/2019 at am Yes Fany Dos Santos MD   vitamin C (ASCORBIC ACID) 500 MG tablet Take 500 mg by mouth daily 8/27/2019 at am Yes Reported, Patient   Lidocaine (LIDOCARE) 4 % Patch Apply to low back topically one time a day for pain and remove per schedule as needed. Unknown at Unknown time  Reported, Patient            Current Medications:           [Auto Hold] acetaminophen  1,000 mg Oral TID     [Auto Hold] amLODIPine  10 mg Oral Daily     [Auto Hold] calcium carbonate  600 mg Oral BID     ceFAZolin  1 g Intravenous See Admin Instructions     ceFAZolin  2 g Intravenous Pre-Op/Pre-procedure x 1 dose     [Auto Hold] cholecalciferol  2,000 Units Oral Daily with supper     [Auto Hold] cloNIDine  0.1 mg Oral BID     [Auto Hold] estradiol  2 g Vaginal Q Mon Wed Fri AM     [Auto Hold] fish oil-omega-3 fatty acids  1 g Oral Daily      [Auto Hold] gabapentin  200 mg Oral BID     [Auto Hold] isosorbide dinitrate  10 mg Oral TID     [Auto Hold] lidocaine  1 patch Transdermal Q24H     [Auto Hold] lidocaine   Transdermal Q8H     [Auto Hold] lidocaine   Transdermal Q24h     [Auto Hold] metoprolol succinate ER  100 mg Oral Daily     [Auto Hold] omeprazole  20 mg Oral QAM AC     [Auto Hold] PRESERVISION AREDS  1 capsule Oral BID     [Auto Hold] sertraline  100 mg Oral Daily     [Auto Hold] sodium chloride (PF)  3 mL Intracatheter Q8H     sodium chloride (PF)  3 mL Intracatheter Q8H     [Auto Hold] sodium chloride (PF)  3 mL Intracatheter Q8H     [Auto Hold] vancomycin  125 mg Oral BID     [Auto Hold] vitamin C  500 mg Oral Daily     [Auto Hold] acetaminophen, [Auto Hold] HYDROmorphone, [Auto Hold] lidocaine 4%, lidocaine 4%, [Auto Hold] lidocaine 4%, [Auto Hold] lidocaine (buffered or not buffered), lidocaine (buffered or not buffered), [Auto Hold] lidocaine (buffered or not buffered), [Auto Hold] magnesium sulfate, [Auto Hold] melatonin, [Auto Hold] naloxone, [Auto Hold] ondansetron **OR** [Auto Hold] ondansetron, [Auto Hold] oxyCODONE, [Auto Hold] polyethylene glycol, [Auto Hold] potassium chloride, [Auto Hold] potassium chloride with lidocaine, [Auto Hold] potassium chloride, [Auto Hold] potassium chloride, [Auto Hold] potassium chloride, [Auto Hold] prochlorperazine **OR** [Auto Hold] prochlorperazine **OR** [Auto Hold] prochlorperazine, [Auto Hold] senna-docusate **OR** [Auto Hold] senna-docusate, [Auto Hold] sodium chloride (PF), sodium chloride (PF), [Auto Hold] sodium chloride (PF)         Allergies:     Allergies   Allergen Reactions     Atorvastatin Muscle Pain (Myalgia)     Macrobid [Nitrofurantoin Anhydrous] Other (See Comments)     Body aches     Sulfa Drugs      Tolerated Bactrim May 2019            Social History:     Social History     Tobacco Use     Smoking status: Never Smoker     Smokeless tobacco: Never Used   Substance Use Topics  "    Alcohol use: No             Family History:     Family History   Problem Relation Age of Onset     Alzheimer Disease Mother         mild     Cardiovascular Father         heart attack     Neurologic Disorder Brother 83        Parkinson's              Review of Systems:   The 10 point Review of Systems is negative other than noted in the HPI            Physical Exam:   Blood pressure 107/47, pulse 73, temperature 97.3  F (36.3  C), temperature source Temporal, resp. rate 16, height 1.549 m (5' 1\"), weight 56.2 kg (124 lb), SpO2 99 %, not currently breastfeeding.  124 lbs 0 oz    Constitutional:   Awake, alert, cooperative, no apparent distress, and appears stated age.     Lungs:   No increased work of breathing, good air exchange.     Musculoskeletal:   There is no significant bruising or swelling around the right shoulder.  The patient demonstrates mild aching with active and passive range of motion of the shoulder but denies significant pain with full passive range of motion.  There is no evidence of overlying skin injury.    The right lower extremity remains immobilized in a long-leg splint.  The patient demonstrates mild edema and ecchymosis around the proximal tibia and fibula on the right.  There is no evidence of overlying skin injury.  The patient is able to wiggle her toes without difficulty.  Sensation is intact in superficial peroneal, deep peroneal, and plantar nerve distributions.  Toes are all warm and well-perfused with brisk capillary refill.              Data:   All new lab and imaging data was reviewed.  Radiographs of the right tibia and fibula obtained in the emergency department last evening were personally reviewed without limitation and demonstrate a mildly angulated and mildly displaced fracture of the right proximal tibia metaphysis.  There is an associated minimally displaced fracture of the proximal fibula.  There is no evidence of intra-articular extension of the tibia " fracture.  Results for orders placed or performed during the hospital encounter of 08/27/19 (from the past 24 hour(s))   CBC with platelets differential   Result Value Ref Range    WBC 11.0 4.0 - 11.0 10e9/L    RBC Count 3.20 (L) 3.8 - 5.2 10e12/L    Hemoglobin 10.0 (L) 11.7 - 15.7 g/dL    Hematocrit 31.6 (L) 35.0 - 47.0 %    MCV 99 78 - 100 fl    MCH 31.3 26.5 - 33.0 pg    MCHC 31.6 31.5 - 36.5 g/dL    RDW 15.9 (H) 10.0 - 15.0 %    Platelet Count 139 (L) 150 - 450 10e9/L    Diff Method Manual Differential     % Neutrophils 97.0 %    % Lymphocytes 1.0 %    % Monocytes 1.0 %    % Eosinophils 1.0 %    % Basophils 0.0 %    Absolute Neutrophil 10.7 (H) 1.6 - 8.3 10e9/L    Absolute Lymphocytes 0.1 (L) 0.8 - 5.3 10e9/L    Absolute Monocytes 0.1 0.0 - 1.3 10e9/L    Absolute Eosinophils 0.1 0.0 - 0.7 10e9/L    Absolute Basophils 0.0 0.0 - 0.2 10e9/L    RBC Morphology Consistent with reported results     Platelet Estimate       Automated count confirmed.  Platelet morphology is normal.   Basic metabolic panel   Result Value Ref Range    Sodium 138 133 - 144 mmol/L    Potassium 3.2 (L) 3.4 - 5.3 mmol/L    Chloride 105 94 - 109 mmol/L    Carbon Dioxide 26 20 - 32 mmol/L    Anion Gap 7 3 - 14 mmol/L    Glucose 80 70 - 99 mg/dL    Urea Nitrogen 23 7 - 30 mg/dL    Creatinine 0.78 0.52 - 1.04 mg/dL    GFR Estimate 68 >60 mL/min/[1.73_m2]    GFR Estimate If Black 78 >60 mL/min/[1.73_m2]    Calcium 9.0 8.5 - 10.1 mg/dL   EKG 12-lead, tracing only   Result Value Ref Range    Interpretation ECG Click View Image link to view waveform and result    XR Tibia & Fibula Right 2 Views    Narrative    EXAM: XR TIBIA and FIBULA RT 2 VW  LOCATION: [facility]  DATE/TIME: 8/28/2019 12:19 AM    INDICATION: Fall, proximal tibia.  COMPARISON: None.    FINDINGS: Mildly impacted, comminuted, and posteriorly angulated fracture of the proximal tibia. There is a mildly comminuted impacted fracture of the proximal right fibula.   Basic  metabolic panel   Result Value Ref Range    Sodium 138 133 - 144 mmol/L    Potassium 4.1 3.4 - 5.3 mmol/L    Chloride 108 94 - 109 mmol/L    Carbon Dioxide 22 20 - 32 mmol/L    Anion Gap 8 3 - 14 mmol/L    Glucose 76 70 - 99 mg/dL    Urea Nitrogen 22 7 - 30 mg/dL    Creatinine 0.83 0.52 - 1.04 mg/dL    GFR Estimate 63 >60 mL/min/[1.73_m2]    GFR Estimate If Black 73 >60 mL/min/[1.73_m2]    Calcium 8.2 (L) 8.5 - 10.1 mg/dL   CBC with platelets   Result Value Ref Range    WBC 29.2 (H) 4.0 - 11.0 10e9/L    RBC Count 2.85 (L) 3.8 - 5.2 10e12/L    Hemoglobin 9.0 (L) 11.7 - 15.7 g/dL    Hematocrit 29.2 (L) 35.0 - 47.0 %     (H) 78 - 100 fl    MCH 31.6 26.5 - 33.0 pg    MCHC 30.8 (L) 31.5 - 36.5 g/dL    RDW 16.3 (H) 10.0 - 15.0 %    Platelet Count 181 150 - 450 10e9/L   INR   Result Value Ref Range    INR 1.18 (H) 0.86 - 1.14   Potassium   Result Value Ref Range    Potassium 4.1 3.4 - 5.3 mmol/L

## 2019-08-28 NOTE — PHARMACY-ADMISSION MEDICATION HISTORY
Admission medication history interview status for this patient is complete. See [facility] admission navigator for allergy information, prior to admission medications and immunization status.     Medication history interview source(s):Patient  Medication history resources (including written lists, pill bottles, clinic record): Discharge summary from 8/27/19    **Patient just discharged <12 hrs prior to admission. Pt states she did not take any meds at home in between admissions.       Changes made to PTA medication list:  Added: none  Deleted: none  Changed: none    Actions taken by pharmacist (provider contacted, etc):None     Additional medication history information:None    Medication reconciliation/reorder completed by provider prior to medication history? Yes    Do you take OTC medications (eg tylenol, ibuprofen, fish oil, eye/ear drops, etc)? Y(Y/N)    For patients on insulin therapy: N (Y/N)        Prior to Admission medications    Medication Sig Last Dose Taking? Auth Provider   amLODIPine (NORVASC) 10 MG tablet Take 1 tablet (10 mg) by mouth daily 8/27/2019 at am Yes Marcy Soares PA-C   Calcium Carbonate (CALCIUM 600 PO) Take 1 tablet by mouth 2 times daily  Past Month at Unknown time Yes Reported, Patient   Cholecalciferol (VITAMIN D3) 2000 UNITS CAPS Take 2,000 Units by mouth daily (with dinner) Past Month at Unknown time Yes Unknown, Entered By History   cloNIDine (CATAPRES) 0.1 MG tablet TAKE ONE TABLET BY MOUTH TWO TIMES A DAY 8/27/2019 at am Yes Marcy Soares PA-C   estradiol (ESTRACE) 0.1 MG/GM vaginal cream Place 2 g vaginally three times a week paraben-free Past Month at Unknown time Yes Marcy Soares PA-C   fish oil-omega-3 fatty acids 1000 MG capsule Take 1 g by mouth daily 8/27/2019 at am Yes Unknown, Entered By History   gabapentin (NEURONTIN) 100 MG capsule Take 2 capsules (200 mg) by mouth 2 times daily 8/27/2019 at am Yes Amanda Kenyon MD   isosorbide dinitrate (ISORDIL) 10 MG  tablet Take 1 tablet (10 mg) by mouth 3 times daily 8/27/2019 at am Yes Marcy Soares PA-C   loperamide (IMODIUM) 2 MG capsule Take 1 capsule (2 mg) by mouth 4 times daily as needed for diarrhea Past Month at Unknown time Yes Amanda Kenyon MD   metoprolol succinate ER (TOPROL-XL) 100 MG 24 hr tablet Take 1 tablet (100 mg) by mouth daily 8/27/2019 at am Yes Marcy Soares PA-C   Multiple Vitamins-Minerals (PRESERVISION AREDS) CAPS Take 1 capsule by mouth 2 times daily  8/27/2019 at am Yes Unknown, Entered By History   omeprazole (PRILOSEC) 20 MG DR capsule Take 1 capsule (20 mg) by mouth every morning (before breakfast) 8/27/2019 at am Yes Fany Dos Santos MD   sertraline (ZOLOFT) 100 MG tablet Take 1 tablet (100 mg) by mouth daily 8/27/2019 at am Yes Marcy Soares PA-C   vancomycin (FIRVANQ) 50 MG/ML oral solution Take 2.5 mLs (125 mg) by mouth 2 times daily for 7 days 8/27/2019 at am Yes Fany Dos Santos MD   vitamin C (ASCORBIC ACID) 500 MG tablet Take 500 mg by mouth daily 8/27/2019 at am Yes Reported, Patient   Lidocaine (LIDOCARE) 4 % Patch Apply to low back topically one time a day for pain and remove per schedule as needed. Unknown at Unknown time  Reported, Patient

## 2019-08-28 NOTE — OP NOTE
PREOPERATIVE DIAGNOSIS: Right proximal tibia metaphyseal fracture and associated proximal fibula fracture.    POSTOPERATIVE DIAGNOSIS: Right proximal tibia metaphyseal fracture and associated proximal fibular fracture.    PROCEDURE(S): ORIF right proximal tibia fracture.    ATTENDING SURGEON: Dr. Molina Pardo.    ASSISTANT SURGEON: None.    ANESTHESIA: General with local anesthetic applied at the conclusion of the case.    EBL: 25 mL.    IMPLANTS: Synthes proximal tibial lateral locking plate with associated interlocking locking and nonlocking screws.    COMPLICATIONS: None apparent.    INDICATIONS: Daya is a pleasant 88 year-old female who presented to the emergency department last evening after sustaining a mechanical fall.  The patient was unable to bear weight on the right leg and radiographs in the emergency department demonstrated mildly displaced and angulated fractures of the proximal tibia metaphysis and proximal fibula.  Given the fracture displacement, operative intervention was recommended to restore appropriate alignment of the tibia, allow for fracture healing, and allow for earlier mobilization of the knee.  After full discussion of the benefits and risks of surgery, the patient provided informed consent to proceed.    The patient was identified in the pre-operative holding area on the date of surgery.  The operative site was marked with indelible marker and the patient was brought back to the operating room and transferred to the operating table in a supine position.  All bony prominences were well-padded.  Anesthesia was administered without complication.  The right lower extremity was prepped and draped in standard sterile fashion.  A pre-operative timeout was performed identifying the correct patient, procedure, operative site, antibiotic administration, and equipment necessary for the procedure.    After Esmarch exsanguination, an upper thigh tourniquet was inflated.  A curvilinear incision  was made over the lateral aspect of the proximal tibia.  Soft tissue dissection was carefully carried down maintaining excellent hemostasis.  The anterior tibialis fascia was sharply incised and the bulk of the muscle belly was reflected plantarly posteriorly to expose the proximal tibia.  The metaphyseal fracture site was identified and pointed reduction forceps were applied across the fracture to maintain anatomic reduction.  Fluoroscopic imaging confirmed anatomic reduction of the metaphyseal tibial fracture.  The fracture was then secured with a lateral proximal tibial locking plate, taking care to ensure bicortical purchase of 4 screws distal to the diaphyseal extension of the fracture and multiple locking screws proximally within the proximal tibia.  There was no evidence of extension of the fracture into the tibial plateau.  Final fluoroscopic images confirmed excellent positioning of all hardware with maintained anatomic reduction of the proximal tibia fracture.  The wound was copiously irrigated.  The anterior tibialis fascia was reapproximated with 2-0 Vicryl suture.  Subcutaneous tissues were reapproximated with a running 3-0 Monocryl subcuticular stitch.  Skin was reapproximated with staples.  0.5% Marcaine was infiltrated around the incision for local anesthetic.  Xeroform and sterile dressings were applied.  The patient was placed into a knee immobilizer.  The patient was extubated and brought back to the PACU in stable condition for further postoperative care.    Postoperatively, the patient will remain strictly nonweightbearing on the right lower extremity.  The patient will remain in the knee immobilizer at all times except for daily hygiene and application of ice.  Over the next few weeks, as the patient is comfortable, she may start loosening the knee immobilizer while at rest.  The patient will be placed on aspirin for DVT prophylaxis postoperatively.  The patient will return to clinic for  follow-up in 2 weeks for repeat evaluation including wound check, staple removal, and nonweightbearing radiographs of the right tibia and fibula.    Of note, all counts were correct at the conclusion of the case.

## 2019-08-28 NOTE — ED NOTES
[facility]  ED Nurse Handoff Report    Daya Ignacio is a 88 year old female   ED Chief complaint: Fall  . ED Diagnosis:   Final diagnoses:   Fall, initial encounter   Tibia/fibula fracture, right, closed, initial encounter   C. difficile colitis     Allergies:   Allergies   Allergen Reactions     Atorvastatin Muscle Pain (Myalgia)     Macrobid [Nitrofurantoin Anhydrous] Other (See Comments)     Body aches     Sulfa Drugs      Tolerated Bactrim May 2019       Code Status: DNR / DNI  Activity level - Baseline/Home:  Independent with walker. Activity Level - Current:   Total Care. Lift room needed: No. Bariatric: No   Needed: No   Isolation: Yes. Infection: Not Applicable  C-Diff (Clostridium Difficile) diagnosis  ESBL.     Vital Signs:   Vitals:    08/27/19 2353 08/28/19 0045 08/28/19 0100   BP: 102/51 109/50 107/48   Pulse: 70 74 73   Resp: 16  16   Temp: 98.2  F (36.8  C)     TempSrc: Oral     SpO2: 97% 96% 96%   Weight: 56.2 kg (124 lb)         Cardiac Rhythm:  ,      Pain level: 0-10 Pain Scale: 5  Patient confused: No. Patient Falls Risk: Yes.   Elimination Status: Has voided   Patient Report - Initial Complaint: Fall with Lower leg injury/deformity. Focused Assessment: Fractured Tib/Fib See Physician note   Tests Performed: Labs, Xray. Abnormal Results: See Radiology.   Treatments provided: Medication, Fluids, Splinting  Family Comments: Son present agreeable to admit  OBS brochure/video discussed/provided to patient:  N/A  ED Medications:   Medications   lidocaine 1 % 0.1-1 mL (has no administration in time range)   lidocaine (LMX4) cream (has no administration in time range)   sodium chloride (PF) 0.9% PF flush 3 mL (has no administration in time range)   sodium chloride (PF) 0.9% PF flush 3 mL (has no administration in time range)   morphine (PF) injection 4 mg (4 mg Intravenous Given 8/28/19 0049)   0.9% sodium chloride BOLUS (1,000 mLs Intravenous New Bag 8/28/19 0011)    ondansetron (ZOFRAN) injection 4 mg (4 mg Intravenous Given 8/28/19 0011)     Drips infusing:  No  For the majority of the shift, the patient's behavior Green. Interventions performed were None.     Severe Sepsis OR Septic Shock Diagnosis Present: No  Recently discharged from our facility yesterday. On antibiotics.       ED Nurse Name/Phone Number: Maryann Wynn RN,   1:06 AM    RECEIVING UNIT ED HANDOFF REVIEW    Above ED Nurse Handoff Report was reviewed: YES  Reviewed by: Yelitza Crawford RN on August 28, 2019 at 1:35 AM

## 2019-08-28 NOTE — PLAN OF CARE
Pt returned from surgery at approximately 1345.  Transferred to bed via hovermat, epositioned in bed, RLE elevated.  Dressing d/I, immobilizer in place.  Denies pain.  Taking sips of water.  Purewick placed. VSS.  Son at bedside.  Reoriented to room and call light.  Will continue to monitor.

## 2019-08-28 NOTE — PROGRESS NOTES
Chart reviewed and patient seen. Please refer to admission H&P by Dr. Pereira for details of care plan.     89 yo female with past medical history most notable for multiple admissions over last 2 months with C.Diff, ESBL infection, weakness. This will be her 9th hospitalization with this issue. Just discharged home yesterday and presented back about 12 hours later with a fall and right tib/fib fracture. Of note, during recent admission her urine and blood cultures were negative although she did have an elevated pro-iris that trended down after few days of meropenem (which was stopped).     Family feels that her significant diarrhea causing dehydration and low BP was what led to fall. Patient tells me that she did not take her BP meds after going home. No evidence of hypotension since admission     Plan for operative repair today. Seen just prior to surgery as she was being taken down to OR.     On review of labs, I do note that her white cell count came back at 29.2. She is afebrile otherwise without any signs of sepsis. This might be a stress reaction although I certainly wonder about presence of clinical C.diff infection. If she has ongoing diarrhea in hospital, then certainly we need to check for c.diff and probably treat with a prolonged vancomycin pulse taper if positive this time. No other obvious source of infection.     Will closely monitor after surgery. Vitals are stable. Called dtr-in-law Lizzy over phone and discussed at length, including the pros/cons of anesthesia and surgery, answered all of her questions.

## 2019-08-28 NOTE — PLAN OF CARE
Up from ER at approximately 0200. Bedrest. Right leg and shoulder pain moderate and managed with PRN oxy and ice. Believes she fell onto right shoulder as well, but has full ROM. Oriented x4. NPO until surgical consult. Pure wick in place. C-diff. precautions.

## 2019-08-28 NOTE — ED TRIAGE NOTES
Patient released from Hospital today for UTI, patient fell at home with obvious Right lower leg deformity. No LOC. ABC's Intact. Large amount of anterior swelling lower leg present. Pulses WNL, can move toes on Right foot.

## 2019-08-28 NOTE — PLAN OF CARE
PT/OT: Orders received, chart reviewed. Pt with new tib/fib fx. Per ortho note patient will require ORIF. Will continue to follow and will initiate therapy as appropriate post op.

## 2019-08-29 ENCOUNTER — TELEPHONE (OUTPATIENT)
Dept: FAMILY MEDICINE | Facility: CLINIC | Age: 84
End: 2019-08-29

## 2019-08-29 ENCOUNTER — PATIENT OUTREACH (OUTPATIENT)
Dept: CARE COORDINATION | Facility: CLINIC | Age: 84
End: 2019-08-29

## 2019-08-29 ENCOUNTER — APPOINTMENT (OUTPATIENT)
Dept: PHYSICAL THERAPY | Facility: CLINIC | Age: 84
DRG: 494 | End: 2019-08-29
Attending: INTERNAL MEDICINE
Payer: MEDICARE

## 2019-08-29 LAB
ANION GAP SERPL CALCULATED.3IONS-SCNC: 5 MMOL/L (ref 3–14)
BUN SERPL-MCNC: 26 MG/DL (ref 7–30)
CALCIUM SERPL-MCNC: 8.2 MG/DL (ref 8.5–10.1)
CHLORIDE SERPL-SCNC: 109 MMOL/L (ref 94–109)
CO2 SERPL-SCNC: 22 MMOL/L (ref 20–32)
CREAT SERPL-MCNC: 0.76 MG/DL (ref 0.52–1.04)
ERYTHROCYTE [DISTWIDTH] IN BLOOD BY AUTOMATED COUNT: 17 % (ref 10–15)
GFR SERPL CREATININE-BSD FRML MDRD: 70 ML/MIN/{1.73_M2}
GLUCOSE SERPL-MCNC: 94 MG/DL (ref 70–99)
HCT VFR BLD AUTO: 26.3 % (ref 35–47)
HGB BLD-MCNC: 8.1 G/DL (ref 11.7–15.7)
MAGNESIUM SERPL-MCNC: 1.7 MG/DL (ref 1.6–2.3)
MCH RBC QN AUTO: 31.3 PG (ref 26.5–33)
MCHC RBC AUTO-ENTMCNC: 30.8 G/DL (ref 31.5–36.5)
MCV RBC AUTO: 102 FL (ref 78–100)
PLATELET # BLD AUTO: 163 10E9/L (ref 150–450)
POTASSIUM SERPL-SCNC: 5.3 MMOL/L (ref 3.4–5.3)
RBC # BLD AUTO: 2.59 10E12/L (ref 3.8–5.2)
SODIUM SERPL-SCNC: 136 MMOL/L (ref 133–144)
WBC # BLD AUTO: 20.3 10E9/L (ref 4–11)

## 2019-08-29 PROCEDURE — 25000128 H RX IP 250 OP 636: Performed by: ORTHOPAEDIC SURGERY

## 2019-08-29 PROCEDURE — 83735 ASSAY OF MAGNESIUM: CPT | Performed by: INTERNAL MEDICINE

## 2019-08-29 PROCEDURE — 12000000 ZZH R&B MED SURG/OB

## 2019-08-29 PROCEDURE — 25000128 H RX IP 250 OP 636: Performed by: INTERNAL MEDICINE

## 2019-08-29 PROCEDURE — 36415 COLL VENOUS BLD VENIPUNCTURE: CPT | Performed by: INTERNAL MEDICINE

## 2019-08-29 PROCEDURE — 99232 SBSQ HOSP IP/OBS MODERATE 35: CPT | Performed by: HOSPITALIST

## 2019-08-29 PROCEDURE — 97530 THERAPEUTIC ACTIVITIES: CPT | Mod: GP | Performed by: PHYSICAL THERAPIST

## 2019-08-29 PROCEDURE — 25000132 ZZH RX MED GY IP 250 OP 250 PS 637: Mod: GY | Performed by: ORTHOPAEDIC SURGERY

## 2019-08-29 PROCEDURE — 97161 PT EVAL LOW COMPLEX 20 MIN: CPT | Mod: GP | Performed by: PHYSICAL THERAPIST

## 2019-08-29 PROCEDURE — 85027 COMPLETE CBC AUTOMATED: CPT | Performed by: INTERNAL MEDICINE

## 2019-08-29 PROCEDURE — 80048 BASIC METABOLIC PNL TOTAL CA: CPT | Performed by: INTERNAL MEDICINE

## 2019-08-29 RX ORDER — ONDANSETRON 4 MG/1
4 TABLET, ORALLY DISINTEGRATING ORAL EVERY 6 HOURS PRN
Status: ON HOLD | OUTPATIENT
Start: 2019-08-29 | End: 2020-11-01

## 2019-08-29 RX ORDER — AMOXICILLIN 250 MG
2 CAPSULE ORAL 2 TIMES DAILY PRN
Status: ON HOLD | OUTPATIENT
Start: 2019-08-29 | End: 2020-05-28

## 2019-08-29 RX ORDER — ASPIRIN 325 MG
325 TABLET, DELAYED RELEASE (ENTERIC COATED) ORAL DAILY
Qty: 30 TABLET | Refills: 0 | Status: SHIPPED | OUTPATIENT
Start: 2019-08-30 | End: 2019-11-20

## 2019-08-29 RX ORDER — POLYETHYLENE GLYCOL 3350 17 G/17G
17 POWDER, FOR SOLUTION ORAL DAILY PRN
Status: SHIPPED | OUTPATIENT
Start: 2019-08-29

## 2019-08-29 RX ORDER — OXYCODONE HYDROCHLORIDE 5 MG/1
2.5 TABLET ORAL EVERY 4 HOURS PRN
Qty: 10 TABLET | Refills: 0 | Status: SHIPPED | OUTPATIENT
Start: 2019-08-29 | End: 2019-09-11

## 2019-08-29 RX ORDER — ACETAMINOPHEN 500 MG
1000 TABLET ORAL EVERY 8 HOURS PRN
Status: SHIPPED | OUTPATIENT
Start: 2019-08-29 | End: 2019-09-03

## 2019-08-29 RX ADMIN — HYDROMORPHONE HYDROCHLORIDE 0.2 MG: 10 INJECTION, SOLUTION INTRAMUSCULAR; INTRAVENOUS; SUBCUTANEOUS at 00:00

## 2019-08-29 RX ADMIN — ACETAMINOPHEN 1000 MG: 500 TABLET, FILM COATED ORAL at 07:43

## 2019-08-29 RX ADMIN — OXYCODONE HYDROCHLORIDE AND ACETAMINOPHEN 500 MG: 500 TABLET ORAL at 07:42

## 2019-08-29 RX ADMIN — OXYCODONE HYDROCHLORIDE 5 MG: 5 TABLET ORAL at 07:43

## 2019-08-29 RX ADMIN — CEFAZOLIN SODIUM 1 G: 1 INJECTION, SOLUTION INTRAVENOUS at 02:43

## 2019-08-29 RX ADMIN — OMEPRAZOLE 20 MG: 20 CAPSULE, DELAYED RELEASE ORAL at 07:43

## 2019-08-29 RX ADMIN — CLONIDINE HYDROCHLORIDE 0.1 MG: 0.1 TABLET ORAL at 07:42

## 2019-08-29 RX ADMIN — OMEGA-3 FATTY ACIDS CAP 1000 MG 1 G: 1000 CAP at 07:42

## 2019-08-29 RX ADMIN — VANCOMYCIN HYDROCHLORIDE 125 MG: KIT at 21:25

## 2019-08-29 RX ADMIN — OXYCODONE HYDROCHLORIDE 5 MG: 5 TABLET ORAL at 21:27

## 2019-08-29 RX ADMIN — CALCIUM 500 MG: 500 TABLET ORAL at 07:43

## 2019-08-29 RX ADMIN — AMLODIPINE BESYLATE 10 MG: 10 TABLET ORAL at 07:43

## 2019-08-29 RX ADMIN — ISOSORBIDE DINITRATE 10 MG: 10 TABLET ORAL at 20:22

## 2019-08-29 RX ADMIN — ACETAMINOPHEN 1000 MG: 500 TABLET, FILM COATED ORAL at 20:22

## 2019-08-29 RX ADMIN — ISOSORBIDE DINITRATE 10 MG: 10 TABLET ORAL at 07:43

## 2019-08-29 RX ADMIN — OXYCODONE HYDROCHLORIDE 5 MG: 5 TABLET ORAL at 12:10

## 2019-08-29 RX ADMIN — ACETAMINOPHEN 1000 MG: 500 TABLET, FILM COATED ORAL at 14:56

## 2019-08-29 RX ADMIN — OXYCODONE HYDROCHLORIDE 5 MG: 5 TABLET ORAL at 02:52

## 2019-08-29 RX ADMIN — METOPROLOL SUCCINATE 100 MG: 100 TABLET, EXTENDED RELEASE ORAL at 07:42

## 2019-08-29 RX ADMIN — CLONIDINE HYDROCHLORIDE 0.1 MG: 0.1 TABLET ORAL at 20:22

## 2019-08-29 RX ADMIN — OXYCODONE HYDROCHLORIDE 5 MG: 5 TABLET ORAL at 16:39

## 2019-08-29 RX ADMIN — VANCOMYCIN HYDROCHLORIDE 125 MG: KIT at 07:55

## 2019-08-29 RX ADMIN — MELATONIN 2000 UNITS: at 16:39

## 2019-08-29 RX ADMIN — GABAPENTIN 200 MG: 100 CAPSULE ORAL at 20:22

## 2019-08-29 RX ADMIN — ISOSORBIDE DINITRATE 10 MG: 10 TABLET ORAL at 14:56

## 2019-08-29 RX ADMIN — SERTRALINE HYDROCHLORIDE 100 MG: 100 TABLET ORAL at 07:43

## 2019-08-29 RX ADMIN — GABAPENTIN 200 MG: 100 CAPSULE ORAL at 07:42

## 2019-08-29 RX ADMIN — SODIUM CHLORIDE 500 ML: 9 INJECTION, SOLUTION INTRAVENOUS at 00:22

## 2019-08-29 RX ADMIN — ASPIRIN 325 MG: 325 TABLET, DELAYED RELEASE ORAL at 07:43

## 2019-08-29 RX ADMIN — CALCIUM 500 MG: 500 TABLET ORAL at 20:22

## 2019-08-29 ASSESSMENT — ACTIVITIES OF DAILY LIVING (ADL)
ADLS_ACUITY_SCORE: 15
ADLS_ACUITY_SCORE: 19
ADLS_ACUITY_SCORE: 15
ADLS_ACUITY_SCORE: 17
ADLS_ACUITY_SCORE: 14
ADLS_ACUITY_SCORE: 17

## 2019-08-29 NOTE — PROGRESS NOTES
08/29/19 0830   Quick Adds   Type of Visit Initial PT Evaluation   Living Environment   Lives With alone   Living Arrangements independent living facility   Home Accessibility no concerns   Transportation Anticipated family or friend will provide   Living Environment Comment Pt lives in I living.    Self-Care   Usual Activity Tolerance good   Equipment Currently Used at Home walker, rolling;cane, straight;shower chair   Activity/Exercise/Self-Care Comment Pt has cleaning services 1/month. Son performs driving and gro[employer] n[employer] shopping. Pt was working with home PT/OT at her apartment   Functional Level Prior   Ambulation 1-->assistive equipment   Transferring 1-->assistive equipment   Toileting 1-->assistive equipment   Bathing 1-->assistive equipment   Communication 0-->understands/communicates without difficulty   Swallowing 0-->swallows foods/liquids without difficulty   Cognition 0 - no cognition issues reported   Fall history within last six months yes   Number of times patient has fallen within last six months 1  (per pt)   General Information   Onset of Illness/Injury or Date of Surgery - Date 08/28/19   Referring Physician Molina Pardo MD   Patient/Family Goals Statement Pt unclear of DC disposition at this time   Pertinent History of Current Problem (include personal factors and/or comorbidities that impact the POC) Pt is status post right proximal tibia metaphyseal fracture and associated proximal fibular fracture. Pt is status post ORIF. PMH including HTN, lumbar compression fractures, upper GI bleed, recurrent ESBL UTIs and C. difficile infection with recent admission here for 6 days for UTI and was just discharged 12 hours ago to home with home care who presents following a fall at her senior living facility.    Precautions/Limitations fall precautions   Weight-Bearing Status - RLE nonweight-bearing  (KI on continuously)   Cognitive Status Examination   Orientation orientation to person, place  "and time   Level of Consciousness alert   Pain Assessment   Patient Currently in Pain Yes, see Vital Sign flowsheet  (9/10)   Integumentary/Edema   Integumentary/Edema Comments as expected following ORIF   Posture    Posture Comments Foward flexed at trunk   Range of Motion (ROM)   ROM Comment R LE in knee immobilizer as ortho recommends, no ROM initially   Strength   Strength Comments decreased functional strength, needing mod A for SLR in knee immobilizer   Bed Mobility   Bed Mobility Comments min/mod A    Transfer Skills   Transfer Comments mod A x 2 with attempts for sit to stand, unable to completely unwt LE    Gait   Gait Comments unable at this time   Balance   Balance Comments limited ability to perform SLS with FWW   General Therapy Interventions   Planned Therapy Interventions balance training;bed mobility training;gait training;strengthening;transfer training;risk factor education;home program guidelines;progressive activity/exercise   Clinical Impression   Criteria for Skilled Therapeutic Intervention yes, treatment indicated   PT Diagnosis decreased functional mobility   Influenced by the following impairments pain, NWB, decreased strength   Functional limitations due to impairments decreased transfers, decreased gait   Clinical Presentation Stable/Uncomplicated   Clinical Presentation Rationale improving status post surgery   Clinical Decision Making (Complexity) Low complexity   Therapy Frequency Daily   Predicted Duration of Therapy Intervention (days/wks) 3 days   Anticipated Discharge Disposition Transitional Care Facility   Risk & Benefits of therapy have been explained Yes   Patient, Family & other staff in agreement with plan of care Yes   [facility] AM-PAC  \"6 Clicks\" V.2 Basic Mobility Inpatient Short Form   1. Turning from your back to your side while in a flat bed without using bedrails? 2 - A Lot   2. Moving from lying on your back to sitting on the side of a flat bed without using " bedrails? 2 - A Lot   3. Moving to and from a bed to a chair (including a wheelchair)? 1 - Total   4. Standing up from a chair using your arms (e.g., wheelchair, or bedside chair)? 2 - A Lot   5. To walk in hospital room? 1 - Total   6. Climbing 3-5 steps with a railing? 1 - Total   Basic Mobility Raw Score (Score out of 24.Lower scores equate to lower levels of function) 9   Total Evaluation Time   Total Evaluation Time (Minutes) 10

## 2019-08-29 NOTE — PROGRESS NOTES
Clinic Care Coordination Contact    Situation: Patient chart reviewed by care coordinator.    Background: RNCC following patient with goal of remaining free of hospitalization. Patient is not meeting this goal.     Assessment: Patient was hospitalized at [facility] from 08/21 - 08/27 with diagnosis of Severe sepsis  secondary to possible urinary tract infection, UTI with history of ESBL, History of C. difficile colitis. Patient presently hospitalized at [facility] since 08/27 with a diagnosis of fall. Appears patient sustained an injury related to this fall.     Plan/Recommendations: RNCC will monitor chart for discharge. RNCC will attempt to complete outreach within 1-2 business days of discharge.     Elizabeth Simmons RN Care Coordinator  [facility] [place]  Email: [email or web address]  Phone: [phone]

## 2019-08-29 NOTE — PROGRESS NOTES
SPIRITUAL HEALTH SERVICES Progress Note  [facility] Ortho/Spine Unit    Saw pt Daya per a routine admission request.  Oriented her to  services.  Daya acknowledged the availability of  support but expressed no needs at this time.  She named her son and daughter-in-law as being central to her support network.    This author and other chaplains remain available per pt's request.    Rob Weiss M.Div., [facility]  Staff   Pager [phone]

## 2019-08-29 NOTE — PLAN OF CARE
Nurse from [id number].  Pt alert and orientated. Pt complained of pain and prn oxycodone given with relief. Pt out of bed with assist of 2 this shift and it did not go well. Pt had a hard time only bearing weight on left leg and was not able to stand for more than a minute and she had to sit back down. Pt attempted again to stand and could not do it. Pt flopped herself back on the edge of the bed. Pt required 2 to get back to bed. Pt did not void this shift and has not voided since she was straight cathed at noon. Fluids encouraged. IVFs infusing. Pt bladder scanned and less than 200 ml. Paged admit pager at [phone] with this text..  pt has not voided since she was straight cathed at noon today. bladder scan at hs is only 113. fluids encouraged and IVFS infusing. should pt have more IVFS.

## 2019-08-29 NOTE — PROGRESS NOTES
[facility]    Hospitalist Progress Note             Date of Admission:  8/27/2019                   Day of hospitalization: 1    Assessment and Plan:      89 yo female with past medical history most notable for multiple admissions over last 2 months with C.Diff, ESBL infection, weakness. This will be her 9th hospitalization with this issue. Just discharged home yesterday and presented back about 12 hours later with a fall and right tib/fib fracture. Of note, during recent admission her urine and blood cultures were negative although she did have an elevated pro-iris that trended down after few days of meropenem (which was stopped).  Who presents with fall and right proximal tibia, fibula fracture.    Right proximal tibia, fibula fracture, Open reduction internal fixation right proximal tibia fracture   -Postoperative care and DVT to orthopedic      Leukocytosis  -Etiology most likely secondary to trauma, will monitor closely no evidence of infection, hold off antibiotics     History of recurrent falls and deconditioning: Multiple hospitalizations this past year for ESBL UTIs and C. difficile.  May need more permanent placement following TCU.    Recent UTI, history ESBL UTIs:   Received 3 days of meropenem and everything seemed to improve.  ID was consulted and recommended no further antibiotics.      History C. difficile: Recently received meropenem and is currently on oral vancomycin 125 mg twice daily for prophylaxis through 9/3/2019 which I will continue..    Hypertension  -For now resume metoprolol succinate, continue Isordil.  Place hold parameters on amlodipine and clonidine    Anxiety: Resume sertraline 100 mg daily.     History of lumbar compression fractures:   Lidocaine patch as needed.  Acetaminophen as above.  Continue her gabapentin 200 mg twice daily.    Possibility of a GI bleed within the past 1 year: Continue omeprazole 20 mg daily.    Chronic anemia and thrombocytopenia: Hemoglobin at  "baseline 9-10.  Platelets also baseline 100-150k.  --------    # Code status: DNR/DNI  # Anticipated discharge date and Disposition: Pending clinical improvement, monitor leukocytosis closely  # DVT: austin Muir MD  [employer] Page (7am - 6pm, M-F)               Subjective   Chief Complaint: fall  Subjective: Complains of lower back pain and hip.  She denies any fevers chills, cough.  Denies any abdominal pain.  Denies any diarrhea.  No other complaints        Objective   /56 (BP Location: Left arm)   Pulse 68   Temp 96.5  F (35.8  C) (Oral)   Resp 18   Ht 1.549 m (5' 1\")   Wt 56.2 kg (124 lb)   SpO2 98%   BMI 23.43 kg/m       Physical Exam  General: Pt in NAD, normal appearance  HEENT: OP clear MMM, no JVD  Lungs: Bibasilar crackles, normal breathing  without accessory muscle usage, no wheezing, rhonchi or crackles  Cardiac: +S1, S2, RRR, no MRG, no edema  Abdominal: normal bowel sounds, NT/ND,   Skin: warm, dry, normal turgor, R leg- dressing in place  Psyche: A& O x2, appropriate affect             Intake/Output Summary (Last 24 hours) at 8/29/2019 1341  Last data filed at 8/29/2019 1224  Gross per 24 hour   Intake 1914 ml   Output 200 ml   Net 1714 ml           Labs and Imaging Results:      Recent Labs   Lab 08/29/19  0716 08/28/19  0850   WBC 20.3* 29.2*   HGB 8.1* 9.0*    181        Recent Labs   Lab 08/29/19  0716 08/28/19  0850    138   CO2 22 22   BUN 26 22        Recent Labs   Lab 08/28/19  0850   INR 1.18*      No results for input(s): CKMB in the last 168 hours.    Invalid input(s): TROPONINT   No results for input(s): ALBUMIN, AST, ALT, ALKPHOS, BILITOT in the last 168 hours.     Micro:     Radio:  XR Surgery BORIS L/T 5 Min Fluoro w Stills   Final Result      XR Tibia & Fibula Right 2 Views   Final Result              Medications:      Scheduled Meds:      acetaminophen  1,000 mg Oral TID     amLODIPine  10 mg Oral Daily     aspirin  325 mg Oral Daily     " calcium carbonate 500 mg (elemental)  500 mg Oral BID     cloNIDine  0.1 mg Oral BID     estradiol  2 g Vaginal Once per day on Mon Wed Fri     fish oil-omega-3 fatty acids  1 g Oral Daily     gabapentin  200 mg Oral BID     isosorbide dinitrate  10 mg Oral TID     lidocaine  1 patch Transdermal Q24H     lidocaine   Transdermal Q8H     lidocaine   Transdermal Q24h     metoprolol succinate ER  100 mg Oral Daily     omeprazole  20 mg Oral QAM AC     sertraline  100 mg Oral Daily     sodium chloride (PF)  3 mL Intracatheter Q8H     vancomycin  125 mg Oral BID     vitamin C  500 mg Oral Daily     vitamin D3  2,000 Units Oral Daily with supper     Continuous Infusions:      sodium chloride Stopped (08/29/19 1224)     PRN Meds:  acetaminophen, sore throat lozenge, HYDROmorphone, lidocaine 4%, lidocaine (buffered or not buffered), magnesium sulfate, melatonin, metoclopramide **OR** metoclopramide, naloxone, ondansetron **OR** ondansetron, oxyCODONE, polyethylene glycol, potassium chloride, potassium chloride with lidocaine, potassium chloride, potassium chloride, potassium chloride, prochlorperazine **OR** prochlorperazine **OR** prochlorperazine, senna-docusate **OR** senna-docusate, sodium chloride (PF)

## 2019-08-29 NOTE — PLAN OF CARE
Bolus given low urine output. Confused repeats self VSS repositioned as tolerated voiding per pure wick.  Oxycodone and dilaudid given for pain control. Voided 200 per pure wick.

## 2019-08-29 NOTE — PLAN OF CARE
PT: PT eval completed. Pt here status post fall sustaining right proximal tibia metaphyseal fracture and associated proximal fibular fracture. Pt is status post ORIF. Pt lives alone in apartment. Pt uses FWW, shower chair. Pt son does grocery shopping and she has assist with laundry.   Discharge Planner PT   Patient plan for discharge: none stated  Current status: Unable to transfer to chair with FWW this am, needing lift. Pt was able to participate in several attempts to stand with FWW, A x 2  Barriers to return to prior living situation: lives alone, needing lift for transfer  Recommendations for discharge: TCU  Rationale for recommendations: Pt is currently functioning below baseline, will benefit from ongoing skilled PT intervention to improve safety and tolerance with mobility skills.         Entered by: Tram Mancia 08/29/2019 9:31 AM

## 2019-08-29 NOTE — PLAN OF CARE
Discharge Planner OT   Patient plan for discharge: unknown  Current status: pt post ORIF, required use of lift for transfer this date  Barriers to return to prior living situation: defer to PT  Recommendations for discharge: defer to PT  Rationale for recommendations: pt may benefit from skilled OT services at next level of care, at this time would not be appropriate to initiate IP OT as pt limited by pain and weakness       Entered by: Isabel Hair 08/29/2019 12:51 PM

## 2019-08-29 NOTE — PLAN OF CARE
Neuro: Alert. Very forgetful.   VS:  VSS  Tele: N/A  Respiratory: LS diminished.   GI: BS hypoacitve. Passing flatus. No BM since surgery.   : WDL  Skin: Right knee covered by compression wrap and immobilizer. CMS to area intact. Pale.   Pain: Well controled with PO oxy, tylenol.   IV: PIV WDL   Transfer: A2 lift.   Diet: Tolerating PO foods and fluids well.   Plan: TBD. Hosptialist, PT, OT SW following. Possible TCU discharge when medically stable.

## 2019-08-29 NOTE — PROGRESS NOTES
Orthopedic Surgery  Daya Ignacio  2019  Admit Date:  2019  POD 1 Day Post-Op  S/P Procedure(s):  Open reduction internal fixation right proximal tibia fracture    Patient resting comfortably in chair.    Pain controlled.  Tolerating oral intake.    Denies nausea or vomiting  Denies chest pain or shortness of breath  No events overnight.     Alert and orient to person, place, and time.  Vital Sign Ranges  Temperature Temp  Av.4  F (36.3  C)  Min: 95.9  F (35.5  C)  Max: 98.4  F (36.9  C)   Blood pressure Systolic (24hrs), Av , Min:97 , Max:159        Diastolic (24hrs), Av, Min:33, Max:70      Pulse Pulse  Av.6  Min: 71  Max: 89   Respirations Resp  Av.4  Min: 8  Max: 18   Pulse oximetry SpO2  Av.4 %  Min: 86 %  Max: 100 %       Dressing is clean, dry, and intact. Knee immobilizer worn  Minimal erythema of the surrounding skin.   Bilateral calves are soft, non-tender.  Bilateral lower extremity is NVI.  Sensation intact bilateral lower extremities  5/5 motor with resisted dorsi and plantar flexion bilaterally  +Dp pulse      Labs:  Recent Labs   Lab Test 19  0716 19  0850 19  0004   POTASSIUM 5.3 4.1  4.1 3.2*     Recent Labs   Lab Test 19  0716 19  0850 19  0004   HGB 8.1* 9.0* 10.0*     Recent Labs   Lab Test 19  0850 17  1259 11  0625   INR 1.18* 1.18* 1.21*     Recent Labs   Lab Test 19  0716 19  0850 19  0004    181 139*       A/P  1. S/p right proximal tibia fracture ORIF   Continue ASA for DVT prophylaxis.     Mobilize with PT/OT    Non-WB right LE.   Continue knee immobilizer  At all times except for laying with bathing and ice   Continue current pain regiment.   Leave dressing intact    2. Disposition   Anticipate d/c to TCU based on progress with PT and medical clearance.    Gisela Mehta PA-C

## 2019-08-30 ENCOUNTER — APPOINTMENT (OUTPATIENT)
Dept: PHYSICAL THERAPY | Facility: CLINIC | Age: 84
DRG: 494 | End: 2019-08-30
Payer: MEDICARE

## 2019-08-30 VITALS
SYSTOLIC BLOOD PRESSURE: 154 MMHG | WEIGHT: 124 LBS | HEIGHT: 61 IN | RESPIRATION RATE: 16 BRPM | TEMPERATURE: 97.5 F | BODY MASS INDEX: 23.41 KG/M2 | DIASTOLIC BLOOD PRESSURE: 56 MMHG | HEART RATE: 66 BPM | OXYGEN SATURATION: 96 %

## 2019-08-30 LAB
ANION GAP SERPL CALCULATED.3IONS-SCNC: 3 MMOL/L (ref 3–14)
BASOPHILS # BLD AUTO: 0 10E9/L (ref 0–0.2)
BASOPHILS NFR BLD AUTO: 0.3 %
BUN SERPL-MCNC: 22 MG/DL (ref 7–30)
CALCIUM SERPL-MCNC: 8.8 MG/DL (ref 8.5–10.1)
CHLORIDE SERPL-SCNC: 108 MMOL/L (ref 94–109)
CO2 SERPL-SCNC: 25 MMOL/L (ref 20–32)
CREAT SERPL-MCNC: 0.63 MG/DL (ref 0.52–1.04)
DIFFERENTIAL METHOD BLD: ABNORMAL
EOSINOPHIL # BLD AUTO: 0.3 10E9/L (ref 0–0.7)
EOSINOPHIL NFR BLD AUTO: 2.8 %
ERYTHROCYTE [DISTWIDTH] IN BLOOD BY AUTOMATED COUNT: 16.9 % (ref 10–15)
GFR SERPL CREATININE-BSD FRML MDRD: 80 ML/MIN/{1.73_M2}
GLUCOSE SERPL-MCNC: 92 MG/DL (ref 70–99)
HCT VFR BLD AUTO: 26 % (ref 35–47)
HGB BLD-MCNC: 8 G/DL (ref 11.7–15.7)
IMM GRANULOCYTES # BLD: 0.1 10E9/L (ref 0–0.4)
IMM GRANULOCYTES NFR BLD: 1.2 %
LYMPHOCYTES # BLD AUTO: 1.3 10E9/L (ref 0.8–5.3)
LYMPHOCYTES NFR BLD AUTO: 11.6 %
MCH RBC QN AUTO: 30.9 PG (ref 26.5–33)
MCHC RBC AUTO-ENTMCNC: 30.8 G/DL (ref 31.5–36.5)
MCV RBC AUTO: 100 FL (ref 78–100)
MONOCYTES # BLD AUTO: 0.5 10E9/L (ref 0–1.3)
MONOCYTES NFR BLD AUTO: 4.7 %
NEUTROPHILS # BLD AUTO: 8.9 10E9/L (ref 1.6–8.3)
NEUTROPHILS NFR BLD AUTO: 79.4 %
NRBC # BLD AUTO: 0 10*3/UL
NRBC BLD AUTO-RTO: 0 /100
PLATELET # BLD AUTO: 188 10E9/L (ref 150–450)
POTASSIUM SERPL-SCNC: 4.6 MMOL/L (ref 3.4–5.3)
RBC # BLD AUTO: 2.59 10E12/L (ref 3.8–5.2)
SODIUM SERPL-SCNC: 136 MMOL/L (ref 133–144)
WBC # BLD AUTO: 11.2 10E9/L (ref 4–11)

## 2019-08-30 PROCEDURE — 25000132 ZZH RX MED GY IP 250 OP 250 PS 637: Mod: GY | Performed by: ORTHOPAEDIC SURGERY

## 2019-08-30 PROCEDURE — 99239 HOSP IP/OBS DSCHRG MGMT >30: CPT | Performed by: HOSPITALIST

## 2019-08-30 PROCEDURE — 85025 COMPLETE CBC W/AUTO DIFF WBC: CPT | Performed by: HOSPITALIST

## 2019-08-30 PROCEDURE — 80048 BASIC METABOLIC PNL TOTAL CA: CPT | Performed by: HOSPITALIST

## 2019-08-30 PROCEDURE — 97530 THERAPEUTIC ACTIVITIES: CPT | Mod: GP

## 2019-08-30 PROCEDURE — 36415 COLL VENOUS BLD VENIPUNCTURE: CPT | Performed by: HOSPITALIST

## 2019-08-30 RX ADMIN — CLONIDINE HYDROCHLORIDE 0.1 MG: 0.1 TABLET ORAL at 08:38

## 2019-08-30 RX ADMIN — CALCIUM 500 MG: 500 TABLET ORAL at 08:37

## 2019-08-30 RX ADMIN — VANCOMYCIN HYDROCHLORIDE 125 MG: KIT at 08:47

## 2019-08-30 RX ADMIN — ISOSORBIDE DINITRATE 10 MG: 10 TABLET ORAL at 14:36

## 2019-08-30 RX ADMIN — ASPIRIN 325 MG: 325 TABLET, DELAYED RELEASE ORAL at 08:37

## 2019-08-30 RX ADMIN — MELATONIN 2000 UNITS: at 17:13

## 2019-08-30 RX ADMIN — GABAPENTIN 200 MG: 100 CAPSULE ORAL at 08:37

## 2019-08-30 RX ADMIN — OXYCODONE HYDROCHLORIDE 5 MG: 5 TABLET ORAL at 17:13

## 2019-08-30 RX ADMIN — ACETAMINOPHEN 1000 MG: 500 TABLET, FILM COATED ORAL at 08:37

## 2019-08-30 RX ADMIN — ACETAMINOPHEN 1000 MG: 500 TABLET, FILM COATED ORAL at 14:36

## 2019-08-30 RX ADMIN — SERTRALINE HYDROCHLORIDE 100 MG: 100 TABLET ORAL at 08:38

## 2019-08-30 RX ADMIN — OXYCODONE HYDROCHLORIDE 5 MG: 5 TABLET ORAL at 06:43

## 2019-08-30 RX ADMIN — OXYCODONE HYDROCHLORIDE 5 MG: 5 TABLET ORAL at 02:03

## 2019-08-30 RX ADMIN — OMEGA-3 FATTY ACIDS CAP 1000 MG 1 G: 1000 CAP at 08:37

## 2019-08-30 RX ADMIN — OMEPRAZOLE 20 MG: 20 CAPSULE, DELAYED RELEASE ORAL at 08:37

## 2019-08-30 RX ADMIN — ISOSORBIDE DINITRATE 10 MG: 10 TABLET ORAL at 08:37

## 2019-08-30 RX ADMIN — AMLODIPINE BESYLATE 10 MG: 10 TABLET ORAL at 08:37

## 2019-08-30 RX ADMIN — OXYCODONE HYDROCHLORIDE AND ACETAMINOPHEN 500 MG: 500 TABLET ORAL at 08:38

## 2019-08-30 RX ADMIN — METOPROLOL SUCCINATE 100 MG: 100 TABLET, EXTENDED RELEASE ORAL at 08:38

## 2019-08-30 ASSESSMENT — ACTIVITIES OF DAILY LIVING (ADL)
ADLS_ACUITY_SCORE: 21
ADLS_ACUITY_SCORE: 19
ADLS_ACUITY_SCORE: 21

## 2019-08-30 NOTE — PLAN OF CARE
Discharge Planner PT   Patient plan for discharge: none stated  Current status: Unable to transfer to chair with FWW this am, needing lift due inability to stand with NWB status >3s. Pt able to perform STSx3 with FWW,min  A x 2. Pt able to move supine to sit requiring CGA/min A for R LE support.  Barriers to return to prior living situation: lives alone, needing lift for transfer  Recommendations for discharge: TCU  Rationale for recommendations: Pt is currently functioning below baseline, will benefit from ongoing skilled PT intervention to improve safety and tolerance with mobility skills.         Entered by: Jeffrey Devlin 08/30/2019 10:43 AM

## 2019-08-30 NOTE — PLAN OF CARE
Confused repeats self frequently reorients easily.  Repositioned as tolerated KI on at all times NWB RLE.  Pure wick in place voiding adequate.  Rates pain 8/10 oxycodone given for pain relief.  Small open area posterior back foam dressing applied.  Plan is to go to tcu at discharge

## 2019-08-30 NOTE — PROGRESS NOTES
Your information has been submitted on August 30th, 2019 at 02:32:36 PM CDT. The confirmation number is [id number]

## 2019-08-30 NOTE — PROGRESS NOTES
Orthopedic Surgery  Daya Ignacio  2019  Admit Date:  2019  POD 2 Days Post-Op  S/P Procedure(s):  Open reduction internal fixation right proximal tibia fracture    Patient resting comfortably in chair.    Pain controlled.  Tolerating oral intake.    Denies nausea or vomiting  Denies chest pain or shortness of breath  No events overnight.     Alert and orient to person, place, and time.  Vital Sign Ranges  Temperature Temp  Av.2  F (36.2  C)  Min: 96.1  F (35.6  C)  Max: 98  F (36.7  C)   Blood pressure Systolic (24hrs), Av , Min:128 , Max:150        Diastolic (24hrs), Av, Min:50, Max:55      Pulse Pulse  Av.5  Min: 63  Max: 66   Respirations Resp  Av.8  Min: 16  Max: 18   Pulse oximetry SpO2  Av %  Min: 95 %  Max: 97 %       Dressing is clean, dry, and intact. Knee immobilizer intact and worn properly  Minimal erythema of the surrounding skin.   Bilateral calves are soft, non-tender.  Bilateral lower extremity is NVI.  Sensation intact bilateral lower extremities  5/5 motor with resisted dorsi and plantar flexion bilaterally  +Dp pulse      Labs:  Recent Labs   Lab Test 19  0742 19  0716 19  0850   POTASSIUM 4.6 5.3 4.1  4.1     Recent Labs   Lab Test 19  0742 19  0716 19  0850   HGB 8.0* 8.1* 9.0*     Recent Labs   Lab Test 19  0850 17  1259 11  0625   INR 1.18* 1.18* 1.21*     Recent Labs   Lab Test 19  0742 19  0716 19  0850    163 181       A/P  1. S/p right proximal tibia fracture with ORIF   Continue ASA for DVT prophylaxis.     Mobilize with PT/OT    Non-WB right LE.   Continue use of knee immobilizer at all times   Leave dressing intact     Continue current pain regiment.    2. Disposition   Anticipate d/c to TCU based on bed placement and medical clearance.    Gisela Mehta PA-C

## 2019-08-30 NOTE — PROGRESS NOTES
CTS  [facility] can accept pt today, orders faxed. Transport needs to be arranged.    Nafisa Serrano RN, BSN CTS  Care Coordinator  [phone]

## 2019-08-30 NOTE — PROGRESS NOTES
SW:  Discharge Planner   Discharge Plans in progress: TCU referrals pending  Barriers to discharge plan: bed availability  Follow up plan: DOM spoke to Wendy in admissions at [facility] who states she will discuss with her supervisor as they already have 4 admissions today.     ADDENDUM: [facility] accepts patient for admission today.    DC orders: sent via DOD  Scripts:  PAS: PAS completed on 8/1/19  Trans: Son to transport after work at approximately 1700. Medical team notified of plan.         Entered by: Deisi Costello 08/30/2019 12:56 PM

## 2019-08-30 NOTE — DISCHARGE SUMMARY
Discharge Summary  Hospitalist Service      Daya Ignacio MRN# [id number]   YOB: 1930 Age: 88 year old     Date of Admission:  8/27/2019  Date of Discharge:  8/30/2019  Admitting Physician:  Jermaine Pereira MD  Discharge Physician: Freya Muir MD   Discharging Service: Hospitalist Service     Primary Provider: Marcy Soares  Primary Care Physician Phone Number: [phone]         Discharge Diagnoses/Problem Oriented Hospital Course (Providers):      Discharge Diagnoses   Right proximal tibia, fibula fracture,  Leukocytosis  History of ESBL UTI  History of C. Difficile  Hypertension  Anxiety      Hospital Course   89 yo female with past medical history most notable for multiple admissions over last 2 months with C.Diff, ESBL infection, weakness. This will be her 9th hospitalization with this issue. Just discharged home a day before admission and presented back about 12 hours later with a fall and right tib/fib fracture. Of note, during recent admission her urine and blood cultures were negative although she did have an elevated pro-iris that trended down after few days of meropenem (which was stopped).  Who presents with fall and right proximal tibia, fibula fracture she is status post Open reduction internal fixation right proximal tibia fracture. She is doing well, pain well controlled.      Right proximal tibia, fibula fracture, Open reduction internal fixation right proximal tibia fracture  - The patient sustained a fall, with a resultant fracture due to osteoporotic bones   -Postoperative care and DVT to orthopedic      Leukocytosis  -Etiology most likely secondary to trauma, improved     History of recurrent falls and deconditioning: Multiple hospitalizations this past year for ESBL UTIs and C. difficile.  May need more permanent placement following TCU.     Recent UTI, history ESBL UTIs:   Received 3 days of meropenem and everything seemed to improve.  ID was consulted and  recommended no further antibiotics.       History C. difficile: Recently received meropenem and is currently on oral vancomycin 125 mg twice daily for prophylaxis through 9/3/2019 which I will continue..     Hypertension  -For now resume metoprolol succinate, continue Isordil.  Place hold parameters on amlodipine and clonidine     Anxiety: Resume sertraline 100 mg daily.      History of lumbar compression fractures:   Lidocaine patch as needed.  Acetaminophen as above.  Continue her gabapentin 200 mg twice daily.     Possibility of a GI bleed within the past 1 year: Continue omeprazole 20 mg daily.  Chronic anemia and thrombocytopenia: Hemoglobin at baseline 9-10.  Platelets also baseline 100-150k.              Code Status:      DNR / DNI         Important Results:                  Pending Results:        Unresulted Labs Ordered in the Past 30 Days of this Admission     Date and Time Order Name Status Description    8/25/2019 1342 Blood culture Preliminary     8/25/2019 1342 Blood culture Preliminary                Discharge Instructions and Follow-Up:      Follow-up Appointments     Follow Up and recommended labs and tests      Follow up with Dr. Pardo/Arnold AVILA, [facility]   [facility] [id number]  to evaluate after   surgery. No follow up labs or test are needed prior to visit. At this   visit x-rays will be taken, sutures/staples removed, and questions to be   answered.  Bring any needed forms with to appointment.  Call for appointment  [phone]         Follow-up and recommended labs and tests       Follow up with primary care provider, Marcy Soares, within 7 days   for hospital follow- up.  No follow up labs or test are needed.                  Discharge Disposition:        Discharged to long-term care facility          Discharge Medications:        Current Discharge Medication List      START taking these medications    Details   acetaminophen  (TYLENOL) 500 MG tablet Take 2 tablets (1,000 mg) by mouth every 8 hours as needed for mild pain    Associated Diagnoses: Tibia/fibula fracture, right, closed, initial encounter      aspirin (ASA) 325 MG EC tablet Take 1 tablet (325 mg) by mouth daily  Qty: 30 tablet, Refills: 0    Associated Diagnoses: Tibia/fibula fracture, right, closed, initial encounter      ondansetron (ZOFRAN-ODT) 4 MG ODT tab Take 1 tablet (4 mg) by mouth every 6 hours as needed for nausea or vomiting    Associated Diagnoses: Tibia/fibula fracture, right, closed, initial encounter      oxyCODONE (ROXICODONE) 5 MG tablet Take 0.5 tablets (2.5 mg) by mouth every 4 hours as needed for severe pain  Qty: 10 tablet, Refills: 0    Associated Diagnoses: Tibia/fibula fracture, right, closed, initial encounter      polyethylene glycol (MIRALAX/GLYCOLAX) packet Take 17 g by mouth daily as needed for constipation    Associated Diagnoses: Tibia/fibula fracture, right, closed, initial encounter      senna-docusate (SENOKOT-S/PERICOLACE) 8.6-50 MG tablet Take 2 tablets by mouth 2 times daily as needed for constipation    Associated Diagnoses: Tibia/fibula fracture, right, closed, initial encounter         CONTINUE these medications which have NOT CHANGED    Details   amLODIPine (NORVASC) 10 MG tablet Take 1 tablet (10 mg) by mouth daily  Qty: 90 tablet, Refills: 1    Associated Diagnoses: Non-STEMI (non-ST elevated myocardial infarction) (H)      Calcium Carbonate (CALCIUM 600 PO) Take 1 tablet by mouth 2 times daily       Cholecalciferol (VITAMIN D3) 2000 UNITS CAPS Take 2,000 Units by mouth daily (with dinner)      cloNIDine (CATAPRES) 0.1 MG tablet TAKE ONE TABLET BY MOUTH TWO TIMES A DAY  Qty: 180 tablet, Refills: 0    Associated Diagnoses: Essential hypertension      estradiol (ESTRACE) 0.1 MG/GM vaginal cream Place 2 g vaginally three times a week paraben-free  Qty: 6 g, Refills: 3    Associated Diagnoses: Symptomatic menopausal or female climacteric  states      fish oil-omega-3 fatty acids 1000 MG capsule Take 1 g by mouth daily      gabapentin (NEURONTIN) 100 MG capsule Take 2 capsules (200 mg) by mouth 2 times daily  Qty: 120 capsule, Refills: 0    Associated Diagnoses: Other chronic pain      isosorbide dinitrate (ISORDIL) 10 MG tablet Take 1 tablet (10 mg) by mouth 3 times daily  Qty: 270 tablet, Refills: 3    Associated Diagnoses: Non-STEMI (non-ST elevated myocardial infarction) (H)      metoprolol succinate ER (TOPROL-XL) 100 MG 24 hr tablet Take 1 tablet (100 mg) by mouth daily  Qty: 90 tablet, Refills: 1    Associated Diagnoses: Essential hypertension      Multiple Vitamins-Minerals (PRESERVISION AREDS) CAPS Take 1 capsule by mouth 2 times daily       omeprazole (PRILOSEC) 20 MG DR capsule Take 1 capsule (20 mg) by mouth every morning (before breakfast)  Qty: 30 capsule, Refills: 0    Associated Diagnoses: Gastroesophageal reflux disease with esophagitis      sertraline (ZOLOFT) 100 MG tablet Take 1 tablet (100 mg) by mouth daily  Qty: 90 tablet, Refills: 1    Associated Diagnoses: Generalized anxiety disorder      vancomycin (FIRVANQ) 50 MG/ML oral solution Take 2.5 mLs (125 mg) by mouth 2 times daily for 7 days  Qty: 35 mL, Refills: 0    Associated Diagnoses: C. difficile colitis      vitamin C (ASCORBIC ACID) 500 MG tablet Take 500 mg by mouth daily      Lidocaine (LIDOCARE) 4 % Patch Apply to low back topically one time a day for pain and remove per schedule as needed.         STOP taking these medications       loperamide (IMODIUM) 2 MG capsule Comments:   Reason for Stopping:                    Allergies:         Allergies   Allergen Reactions     Atorvastatin Muscle Pain (Myalgia)     Macrobid [Nitrofurantoin Anhydrous] Other (See Comments)     Body aches     Sulfa Drugs      Tolerated Bactrim May 2019            Consultations This Hospital Stay:        Consultation during this admission received from orthopedics          Condition and Physical  "Exam on Discharge:        Discharge condition: Stable   Discharge vitals: Blood pressure 135/55, pulse 66, temperature 97.4  F (36.3  C), resp. rate 16, height 1.549 m (5' 1\"), weight 56.2 kg (124 lb), SpO2 95 %, not currently breastfeeding.   General: Pt in NAD, normal appearance  HEENT: OP clear MMM, no JVD  Lungs: Clear to Auscultation Bilateral, normal breathing  without accessory muscle usage, no wheezing, rhonchi or crackles  Cardiac: +S1, S2, RRR, no MRG, no edema  Abdominal: normal bowel sounds, NT/ND, no hepatosplenomegaly  Skin: warm, dry, normal turgor, no rash  Psyche: A& O x3, appropriate affect            Discharge Orders for Skilled Facility (from Discharge Orders):        After Care Instructions     Activity - Up with assistive device      Activity - Up with nursing assistance      Advance Diet as Tolerated      Follow this diet upon discharge: Orders Placed This Encounter      Advance Diet as Tolerated: Regular Diet Adult         Diet      Follow this diet upon discharge: Orders Placed This Encounter      Advance Diet as Tolerated: Regular Diet Adult      Advance Diet as Tolerated         Fall precautions      General info for SNF      Length of Stay Estimate: Short Term Care: Estimated # of Days <30  Condition at Discharge: Improving  Level of care:skilled   Rehabilitation Potential: Good  Admission H&P remains valid and up-to-date: Yes  Recent Chemotherapy: N/A  Use Nursing Home Standing Orders: Yes         Mantoux instructions      Give two-step Mantoux (PPD) Per Facility Policy Yes         Weight bearing status      Non-WB right LE         Wound care (specify)      Site:   Right knee  Instructions:  Leave dressing intact if Aquacel and remove at POD #7-10, remove staples POD #14  Daily dressing changes if gauze and tape                    Rehab orders for Skilled Facility (from Discharge Orders):      Referrals     Future Labs/Procedures    Occupational Therapy Adult Consult     Comments:    " Evaluate and treat as clinically indicated.    Reason:  Right tibial plateau fracture ORIF    Physical Therapy Adult Consult     Comments:    Evaluate and treat as clinically indicated.    Reason:  Right tibial plateau fracture ORIF             Discharge Time:      Greater than 30 minutes.        Image Results From This Hospital Stay (For Non-EPIC Providers):        Results for orders placed or performed during the hospital encounter of 08/27/19   XR Tibia & Fibula Right 2 Views    Narrative    EXAM: XR TIBIA and FIBULA RT 2 VW  LOCATION: [facility]  DATE/TIME: 8/28/2019 12:19 AM    INDICATION: Fall, proximal tibia.  COMPARISON: None.    FINDINGS: Mildly impacted, comminuted, and posteriorly angulated fracture of the proximal tibia. There is a mildly comminuted impacted fracture of the proximal right fibula.   XR Surgery BORIS L/T 5 Min Fluoro w Stills    Narrative    This exam was marked as non-reportable because it will not be read by a   radiologist or a [place] non-radiologist provider.                     Most Recent Lab Results In EPIC (For Non-EPIC Providers):    Most Recent 3 CBC's:  Recent Labs   Lab Test 08/30/19  0742 08/29/19  0716 08/28/19  0850   WBC 11.2* 20.3* 29.2*   HGB 8.0* 8.1* 9.0*    102* 103*    163 181      Most Recent 3 BMP's:  Recent Labs   Lab Test 08/30/19  0742 08/29/19  0716 08/28/19  0850    136 138   POTASSIUM 4.6 5.3 4.1  4.1   CHLORIDE 108 109 108   CO2 25 22 22   BUN 22 26 22   CR 0.63 0.76 0.83   ANIONGAP 3 5 8   DAVE 8.8 8.2* 8.2*   GLC 92 94 76     Most Recent 3 Troponin's:No lab results found.  Most Recent 3 INR's:  Recent Labs   Lab Test 08/28/19  0850 06/20/17  1259 06/27/11  0625   INR 1.18* 1.18* 1.21*     Most Recent 2 LFT's:  Recent Labs   Lab Test 08/21/19  1718 07/22/19  1014   AST 71* 13   ALT 52* 16   ALKPHOS 96 83   BILITOTAL 2.2* 0.8     Most Recent Cholesterol Panel:  Recent Labs   Lab Test 02/19/19  1022   CHOL 176   *    HDL 49*   TRIG 95     Most Recent 6 Bacteria Isolates From Any Culture (See EPIC Reports for Culture Details):  Recent Labs   Lab Test 08/25/19  1421 08/25/19  1409 08/25/19  1359 08/21/19  2221 08/21/19  1953 07/28/19  0841   CULT No growth No growth after 5 days No growth after 5 days No growth  No growth No growth No growth     Most Recent TSH, T4 and HgbA1c:  Recent Labs   Lab Test 01/13/17  1020  08/23/13  0958   TSH  --   --  0.91   A1C 5.3   < >  --     < > = values in this interval not displayed.

## 2019-08-31 NOTE — PLAN OF CARE
Physical Therapy Discharge Summary    Reason for therapy discharge:    Discharged to transitional care facility.    Progress towards therapy goal(s). See goals on Care Plan in [facility] electronic health record for goal details.  Goals partially met.  Barriers to achieving goals:   discharge from facility.    Therapy recommendation(s):    Continued therapy is recommended.  Rationale/Recommendations:  strneght training, activity tolerance, and addressing his level of assistance with mobility. .

## 2019-09-03 ENCOUNTER — NURSING HOME VISIT (OUTPATIENT)
Dept: GERIATRICS | Facility: CLINIC | Age: 84
End: 2019-09-03
Payer: MEDICARE

## 2019-09-03 ENCOUNTER — RECORDS - HEALTHEAST (OUTPATIENT)
Dept: LAB | Facility: CLINIC | Age: 84
End: 2019-09-03

## 2019-09-03 ENCOUNTER — PATIENT OUTREACH (OUTPATIENT)
Dept: CARE COORDINATION | Facility: CLINIC | Age: 84
End: 2019-09-03

## 2019-09-03 VITALS
BODY MASS INDEX: 25.8 KG/M2 | DIASTOLIC BLOOD PRESSURE: 73 MMHG | WEIGHT: 131.4 LBS | RESPIRATION RATE: 16 BRPM | SYSTOLIC BLOOD PRESSURE: 157 MMHG | OXYGEN SATURATION: 89 % | HEART RATE: 70 BPM | HEIGHT: 60 IN | TEMPERATURE: 97.6 F

## 2019-09-03 DIAGNOSIS — R53.81 PHYSICAL DECONDITIONING: ICD-10-CM

## 2019-09-03 DIAGNOSIS — S82.101D CLOSED FRACTURE OF PROXIMAL END OF RIGHT TIBIA AND FIBULA WITH ROUTINE HEALING, SUBSEQUENT ENCOUNTER: Primary | ICD-10-CM

## 2019-09-03 DIAGNOSIS — Z87.81 HISTORY OF COMPRESSION FRACTURE OF SPINE: ICD-10-CM

## 2019-09-03 DIAGNOSIS — N39.0 URINARY TRACT INFECTION DUE TO ESBL KLEBSIELLA: ICD-10-CM

## 2019-09-03 DIAGNOSIS — Z71.89 ACP (ADVANCE CARE PLANNING): ICD-10-CM

## 2019-09-03 DIAGNOSIS — B96.89 URINARY TRACT INFECTION DUE TO ESBL KLEBSIELLA: ICD-10-CM

## 2019-09-03 DIAGNOSIS — D69.6 THROMBOCYTOPENIA (H): ICD-10-CM

## 2019-09-03 DIAGNOSIS — D64.9 ANEMIA, UNSPECIFIED TYPE: ICD-10-CM

## 2019-09-03 DIAGNOSIS — Z87.81 S/P ORIF (OPEN REDUCTION INTERNAL FIXATION) FRACTURE: ICD-10-CM

## 2019-09-03 DIAGNOSIS — D72.829 LEUKOCYTOSIS, UNSPECIFIED TYPE: ICD-10-CM

## 2019-09-03 DIAGNOSIS — I10 ESSENTIAL HYPERTENSION: ICD-10-CM

## 2019-09-03 DIAGNOSIS — Z86.19 HX OF CLOSTRIDIUM DIFFICILE INFECTION: ICD-10-CM

## 2019-09-03 DIAGNOSIS — Z87.19 HISTORY OF GI BLEED: ICD-10-CM

## 2019-09-03 DIAGNOSIS — S82.401A TIBIA/FIBULA FRACTURE, RIGHT, CLOSED, INITIAL ENCOUNTER: ICD-10-CM

## 2019-09-03 DIAGNOSIS — Z98.890 S/P ORIF (OPEN REDUCTION INTERNAL FIXATION) FRACTURE: ICD-10-CM

## 2019-09-03 DIAGNOSIS — S82.831D CLOSED FRACTURE OF PROXIMAL END OF RIGHT TIBIA AND FIBULA WITH ROUTINE HEALING, SUBSEQUENT ENCOUNTER: Primary | ICD-10-CM

## 2019-09-03 DIAGNOSIS — S82.201A TIBIA/FIBULA FRACTURE, RIGHT, CLOSED, INITIAL ENCOUNTER: ICD-10-CM

## 2019-09-03 DIAGNOSIS — F41.1 GENERALIZED ANXIETY DISORDER: ICD-10-CM

## 2019-09-03 PROCEDURE — 99310 SBSQ NF CARE HIGH MDM 45: CPT | Performed by: NURSE PRACTITIONER

## 2019-09-03 RX ORDER — ACETAMINOPHEN 500 MG
1000 TABLET ORAL 3 TIMES DAILY
Start: 2019-09-03 | End: 2019-11-20

## 2019-09-03 ASSESSMENT — MIFFLIN-ST. JEOR: SCORE: 947.53

## 2019-09-03 NOTE — PROGRESS NOTES
Clinic Care Coordination Contact  Care Coordination Transition Communication    Referral Source: IP Report    Clinical Data: Patient was hospitalized at [facility] from 08/27 to 08/30 with diagnosis of right proximal tibia, fibula fracture.     Transition to Facility:              Facility Name: [facility].               RNCC sent secure PHI fax via communication management tab. Requested notification of patients discharge and any outstanding care coordination needs.     Plan: RN/SW Care Coordinator will await notification from facility staff informing RN/SW Care Coordinator of patient's discharge plans/needs. RN/SW Care Coordinator will review chart and outreach to facility staff every 4 weeks and as needed.     Elizabeth Simmons RN Care Coordinator  [facility] [place] [facility]  Email: [email or web address]  Phone: [phone]

## 2019-09-03 NOTE — PROGRESS NOTES
[place] [facility]  PRIMARY CARE PROVIDER AND CLINIC:  Marcy Soares PA-C, [address] [facility] [address]  Chief Complaint   Patient presents with     Hospital F/U     [place] Medical Record Number:  [id number]  Place of Service where encounter took place:  [facility] ([facility]) [[id number]]    Daya Ignacio  is a 88 year old  (10/11/1930), admitted to the above facility from  [facility]. Hospital stay 8/27/19 through 8/30/19. Patient [facility] also [facility] through 8/27/19. Admitted to this facility for  rehab, medical management and nursing care.    HPI:    HPI information obtained from: facility chart records, facility staff, patient report and [facility] chart review.   Brief Summary of Hospital Course: Ms. Daya Ignacio is an 88 year old female with PMH of HTN, lumbar compression fractures, upper GI bleed, generalized anxiety disorder, recurrent ESBL UTI and c.diff infection. Patient returned to ED with fall after discharge from the hospital 12 hours earlier after being admitted for UTI for 6 days. Per hospital notes she was walking in her kitchen and became unwell, fell and instantly had pain in her right leg. S/p ORIF to right proximal tibia fracture on  8/28/19. Is NWB to RLE- should wear knee immobilizer at all times except for with bathing and ice. Developed leukocytosis, but suspect secondary to trauma. With multiple recurrent hospitalizations this past year for ESBL UTI and c.difficile. Is on c.diff taper through 9/3. Discharged to TCU.       Updates on Status Since Skilled nursing Admission: Ms. Ignacio was seen today for admission visit to TCU. She reports pain to leg rated 8/10. Reports it minimally improves with pain medication. Also reports chronic pain to back, for which she uses lidocaine patches.     Oriented to person, place and time today.     Reports occasional SOB depending on what she is doing at the time. No SOB  now. Denies CP, edema, dizziness. Reports bowels moving about 1-2 times per day, formed. Denies dysuria.     Lives alone in apartment.     Reviewed VS today.     Reviewed code status and POLST with patient. She would like to remain DNR with selective treatment.     CODE STATUS/ADVANCE DIRECTIVES DISCUSSION:   DNR / DNI  Patient's living condition: lives alone  ALLERGIES: Atorvastatin; Macrobid [nitrofurantoin anhydrous]; and Sulfa drugs  PAST MEDICAL HISTORY:  has a past medical history of 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 (4/16/2014), h/o Clostridium difficile colitis (06/2019), Hypertension, Lumbar compression fracture (H), Macular degeneration (senile) of retina, unspecified, Upper GI bleed (06/2019), and Urinary tract infection due to extended-spectrum beta lactamase (ESBL)-producing Klebsiella (05/2019). She also has no past medical history of Complication of anesthesia or Malignant hyperthermia.  PAST SURGICAL HISTORY:   has a past surgical history that includes Esophagoscopy, gastroscopy, duodenoscopy (EGD), combined (N/A, 6/11/2019); Cystoscopy (N/A, 8/1/2019); Hip replacement NOS (Right); Hip Pinning procedure (Left); Tonsillectomy, adenoidectomy, combined; Cataract removal NOS (Bilateral); and Open reduction internal fixation tibial plateau (Right, 8/28/2019).  FAMILY HISTORY: family history includes Alzheimer Disease in her mother; Cardiovascular in her father; Neurologic Disorder (age of onset: 83) in her brother.  SOCIAL HISTORY:   reports that she has never smoked. She has never used smokeless tobacco. She reports that she does not drink alcohol or use drugs.    Post Discharge Medication Reconciliation Status: discharge medications reconciled and changed, per note/orders (see AVS)    Current Outpatient Medications   Medication Sig Dispense Refill     acetaminophen (TYLENOL) 500 MG tablet Take 2 tablets (1,000 mg) by mouth 3 times daily       amLODIPine (NORVASC) 10 MG tablet Take 1 tablet (10  mg) by mouth daily 90 tablet 1     aspirin (ASA) 325 MG EC tablet Take 1 tablet (325 mg) by mouth daily 30 tablet 0     Calcium Carbonate (CALCIUM 600 PO) Take 1 tablet by mouth 2 times daily        Cholecalciferol (VITAMIN D3) 2000 UNITS CAPS Take 2,000 Units by mouth daily (with dinner)       cloNIDine (CATAPRES) 0.1 MG tablet TAKE ONE TABLET BY MOUTH TWO TIMES A  tablet 0     estradiol (ESTRACE) 0.1 MG/GM vaginal cream Place 2 g vaginally three times a week paraben-free 6 g 3     fish oil-omega-3 fatty acids 1000 MG capsule Take 1 g by mouth daily       gabapentin (NEURONTIN) 100 MG capsule Take 2 capsules (200 mg) by mouth 2 times daily 120 capsule 0     isosorbide dinitrate (ISORDIL) 10 MG tablet Take 1 tablet (10 mg) by mouth 3 times daily 270 tablet 3     Lidocaine (LIDOCARE) 4 % Patch Apply to low back topically one time a day for pain and remove per schedule as needed.       metoprolol succinate ER (TOPROL-XL) 100 MG 24 hr tablet Take 1 tablet (100 mg) by mouth daily 90 tablet 1     Multiple Vitamins-Minerals (PRESERVISION AREDS) CAPS Take 1 capsule by mouth 2 times daily        omeprazole (PRILOSEC) 20 MG DR capsule Take 1 capsule (20 mg) by mouth every morning (before breakfast) 30 capsule 0     ondansetron (ZOFRAN-ODT) 4 MG ODT tab Take 1 tablet (4 mg) by mouth every 6 hours as needed for nausea or vomiting       oxyCODONE (ROXICODONE) 5 MG tablet Take 0.5 tablets (2.5 mg) by mouth every 4 hours as needed for severe pain 10 tablet 0     polyethylene glycol (MIRALAX/GLYCOLAX) packet Take 17 g by mouth daily as needed for constipation       senna-docusate (SENOKOT-S/PERICOLACE) 8.6-50 MG tablet Take 2 tablets by mouth 2 times daily as needed for constipation       sertraline (ZOLOFT) 100 MG tablet Take 1 tablet (100 mg) by mouth daily 90 tablet 1     vancomycin (FIRVANQ) 50 MG/ML oral solution Take 2.5 mLs (125 mg) by mouth 2 times daily for 7 days 35 mL 0     vitamin C (ASCORBIC ACID) 500 MG tablet  Take 500 mg by mouth daily       ROS:  10 point ROS of systems including Constitutional, Eyes, Respiratory, Cardiovascular, Gastroenterology, Genitourinary, Integumentary, Musculoskeletal, Neurological, Psychiatric were all negative except for pertinent positives noted in my HPI.    Vitals:  BP (!) 157/73   Pulse 70   Temp 97.6  F (36.4  C)   Resp 16   Ht 1.524 m (5')   Wt 59.6 kg (131 lb 6.4 oz)   SpO2 (!) 89%   BMI 25.66 kg/m    Exam:    GENERAL APPEARANCE:  Alert, pleasant and cooperative, elderly female lying in bed on exam today  HEENT: normocephalic, moist mucous membranes, nose without drainage or crusting, hearing acuity: intact  RESP:  respiratory effort normal, no respiratory distress, Lung sounds clear, patient is on RA  CV: auscultation of heart done, rate and rhythm regular. no murmur, no rub or gallop. No peripheral edema, + pedal pulse  ABDOMEN:  + bowel sounds, soft, nontender, no grimacing or guarding with palpation.  M/S:   Gait and station abnormal: NWB to RLE; Digits and nails normal, able to wiggle toes well  SKIN:  Inspection and palpation of skin and subcutaneous tissue: skin warm, dry without rashes, right knee with two small areas of drainage present  NEURO: no facial asymmetry, no speech deficits and able to follow directions, moves all extremities symmetrically  PSYCH: oriented x person, place and time; insight and judgement intact, memory baseline, affect and mood normal      Lab/Diagnostic data:  Recent labs in [facility] reviewed by me today.     ASSESSMENT/PLAN:  (S82.101D,  S82.831D) Closed fracture of proximal end of right tibia and fibula with routine healing, subsequent encounter  (primary encounter diagnosis)  (Z96.7, Z87.81) S/p ORIF  Comment: Acute, reports pain today 8/10- not at goal  -S/p ORIF to right proximal tibia fracture on 8/28/19.   Plan: Schedule tylenol TID. Continue oxycodone PRN- to continue for shortest duration as possible. ASA for DVT prophy. NWB to RLE- should  wear knee immobilizer at all times except for with bathing and ice. Bowel regimen PRN. Follow up with ortho per recommendations. Dressing to remain intact per orders.     (N39.0,  B96.89) Urinary tract infection due to ESBL Klebsiella  Comment: Acute- with reccurent UTI due to ESBL- no active infection  -completed meropenem inpatient during recent admission (discharged 8/27).  Plan: Monitor closely.    (Z86.19) Hx of Clostridium difficile infection  Comment: Acute on chronic- having 1-2 BM formed daily  Plan: Completes vanco taper today. Contact precautions until complete. Monitor closely.     (D72.829) Leukocytosis  Comment: Acute, suspected to be due to trauma and was trending down at hospital discharge- but with recent frequent infections- is afebrile and without symptoms of infection  Plan: CBC 9/4. Monitor    (I10) Essential hypertension  Comment: Chronic, BP variable since admission to TCU- Plan: Will not adjust medications today and ok for BP to be slightly above goal (<150/90) due to frailty, age and lower BPs in hospital- consider adjustments if BP remains in this range. Continue PTA amlodipine, metoprolol XL, imdur, clonidine. VS per TCU policy.      (F41.1) Generalized anxiety disorder  Comment: chronic  Plan: Continue PTA sertraline. Monitor     (Z87.81) History of compression fracture of spine  Comment: Chronic  Plan: Continue PTA lidocaine patch, gabapentin for pain. Monitor.     (D64.9) Anemia, unspecified type  Comment: Chronic, stable at baseline at last check  Hemoglobin   Date Value Ref Range Status   08/30/2019 8.0 (L) 11.7 - 15.7 g/dL Final   08/29/2019 8.1 (L) 11.7 - 15.7 g/dL Final     -baseline 8-10  Plan: CBC 9/4. Monitor for s/sx of bleeding.    (D69.6) Thrombocytopenia (H)  Comment: Chronic, within baseline at last check   Platelet Count   Date Value Ref Range Status   08/30/2019 188 150 - 450 10e9/L Final   -Baseline variable 90s-200s  Plan: CBC 9/4. Monitor for s/sx of  bleeding.    (Z87.19) History of GI bleed  Comment: With possible GI bleed in 6/2019  Plan: Continue PTA omeprazole. Will hold fish oil while on ASA at TCU. Monitor    (R53.81) Physical deconditioning  Comment: Acute, secondary to recent hospitalization, medical conditions as above  Plan: Encourage participation in physical therapy/occupational therapy for strengthening and deconditioning. Discharge planning per their recommendation. Social work to assist with d/c planning.      (Z71.89) ACP (advance care planning)  Reviewed code status and POLST with patient. She would like to remain DNR with selective treatment.         Total time spent with patient visit at the [facility] nursing [facility] was 48 minutes including patient visit, review of past records and review of admission orders, medication reconciliation, review of pharmacy recommendations. Greater than 50% of total time spent with counseling and coordinating care due to coordinating care with nurse on admission orders, coordinating care with follow up labs and appointments and counseling patient/resident for 15 minutes on: options of code status and their current code status order, current medications (treatments) reconciled from the hospital and current pain control plan and controlled substances ordered and taper plan of care.    Electronically signed by:  KARTHIK Walter CNP

## 2019-09-03 NOTE — LETTER
9/3/2019        RE: Daya Ignacio  [address]  [facility] [address]        [place] [facility]  PRIMARY CARE PROVIDER AND CLINIC:  Marcy Soares PA-C, [address] / [facility] [address]  Chief Complaint   Patient presents with     Hospital F/U     [facility] Medical Record Number:  [id number]  Place of Service where encounter took place:  [facility] ([facility]) [[id number]]    Daya Ignacio  is a 88 year old  (10/11/1930), admitted to the above facility from  [facility]. Hospital stay 8/27/19 through 8/30/19. Patient was also admitted to [facility] 8/21/19 through 8/27/19. Admitted to this facility for  rehab, medical management and nursing care.    HPI:    HPI information obtained from: facility chart records, facility staff, patient report and [facility] chart review.   Brief Summary of Hospital Course: Ms. Daya Ignacio is an 88 year old female with PMH of HTN, lumbar compression fractures, upper GI bleed, generalized anxiety disorder, recurrent ESBL UTI and c.diff infection. Patient returned to ED with fall after discharge from the hospital 12 hours earlier after being admitted for UTI for 6 days. Per hospital notes she was walking in her kitchen and became unwell, fell and instantly had pain in her right leg. S/p ORIF to right proximal tibia fracture on  8/28/19. Is NWB to RLE- should wear knee immobilizer at all times except for with bathing and ice. Developed leukocytosis, but suspect secondary to trauma. With multiple recurrent hospitalizations this past year for ESBL UTI and c.difficile. Is on c.diff taper through 9/3. Discharged to TCU.       Updates on Status Since Skilled nursing Admission: Ms. Ignacio was seen today for admission visit to TCU. She reports pain to leg rated 8/10. Reports it minimally improves with pain medication. Also reports chronic pain to back, for which she uses lidocaine patches.     Oriented to person,  place and time today.     Reports occasional SOB depending on what she is doing at the time. No SOB now. Denies CP, edema, dizziness. Reports bowels moving about 1-2 times per day, formed. Denies dysuria.     Lives alone in apartment.     Reviewed VS today.     Reviewed code status and POLST with patient. She would like to remain DNR with selective treatment.     CODE STATUS/ADVANCE DIRECTIVES DISCUSSION:   DNR / DNI  Patient's living condition: lives alone  ALLERGIES: Atorvastatin; Macrobid [nitrofurantoin anhydrous]; and Sulfa drugs  PAST MEDICAL HISTORY:  has a past medical history of 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 (4/16/2014), h/o Clostridium difficile colitis (06/2019), Hypertension, Lumbar compression fracture (H), Macular degeneration (senile) of retina, unspecified, Upper GI bleed (06/2019), and Urinary tract infection due to extended-spectrum beta lactamase (ESBL)-producing Klebsiella (05/2019). She also has no past medical history of Complication of anesthesia or Malignant hyperthermia.  PAST SURGICAL HISTORY:   has a past surgical history that includes Esophagoscopy, gastroscopy, duodenoscopy (EGD), combined (N/A, 6/11/2019); Cystoscopy (N/A, 8/1/2019); Hip replacement NOS (Right); Hip Pinning procedure (Left); Tonsillectomy, adenoidectomy, combined; Cataract removal NOS (Bilateral); and Open reduction internal fixation tibial plateau (Right, 8/28/2019).  FAMILY HISTORY: family history includes Alzheimer Disease in her mother; Cardiovascular in her father; Neurologic Disorder (age of onset: 83) in her brother.  SOCIAL HISTORY:   reports that she has never smoked. She has never used smokeless tobacco. She reports that she does not drink alcohol or use drugs.    Post Discharge Medication Reconciliation Status: discharge medications reconciled and changed, per note/orders (see AVS)    Current Outpatient Medications   Medication Sig Dispense Refill     acetaminophen (TYLENOL) 500 MG tablet Take 2  tablets (1,000 mg) by mouth 3 times daily       amLODIPine (NORVASC) 10 MG tablet Take 1 tablet (10 mg) by mouth daily 90 tablet 1     aspirin (ASA) 325 MG EC tablet Take 1 tablet (325 mg) by mouth daily 30 tablet 0     Calcium Carbonate (CALCIUM 600 PO) Take 1 tablet by mouth 2 times daily        Cholecalciferol (VITAMIN D3) 2000 UNITS CAPS Take 2,000 Units by mouth daily (with dinner)       cloNIDine (CATAPRES) 0.1 MG tablet TAKE ONE TABLET BY MOUTH TWO TIMES A  tablet 0     estradiol (ESTRACE) 0.1 MG/GM vaginal cream Place 2 g vaginally three times a week paraben-free 6 g 3     fish oil-omega-3 fatty acids 1000 MG capsule Take 1 g by mouth daily       gabapentin (NEURONTIN) 100 MG capsule Take 2 capsules (200 mg) by mouth 2 times daily 120 capsule 0     isosorbide dinitrate (ISORDIL) 10 MG tablet Take 1 tablet (10 mg) by mouth 3 times daily 270 tablet 3     Lidocaine (LIDOCARE) 4 % Patch Apply to low back topically one time a day for pain and remove per schedule as needed.       metoprolol succinate ER (TOPROL-XL) 100 MG 24 hr tablet Take 1 tablet (100 mg) by mouth daily 90 tablet 1     Multiple Vitamins-Minerals (PRESERVISION AREDS) CAPS Take 1 capsule by mouth 2 times daily        omeprazole (PRILOSEC) 20 MG DR capsule Take 1 capsule (20 mg) by mouth every morning (before breakfast) 30 capsule 0     ondansetron (ZOFRAN-ODT) 4 MG ODT tab Take 1 tablet (4 mg) by mouth every 6 hours as needed for nausea or vomiting       oxyCODONE (ROXICODONE) 5 MG tablet Take 0.5 tablets (2.5 mg) by mouth every 4 hours as needed for severe pain 10 tablet 0     polyethylene glycol (MIRALAX/GLYCOLAX) packet Take 17 g by mouth daily as needed for constipation       senna-docusate (SENOKOT-S/PERICOLACE) 8.6-50 MG tablet Take 2 tablets by mouth 2 times daily as needed for constipation       sertraline (ZOLOFT) 100 MG tablet Take 1 tablet (100 mg) by mouth daily 90 tablet 1     vancomycin (FIRVANQ) 50 MG/ML oral solution Take  2.5 mLs (125 mg) by mouth 2 times daily for 7 days 35 mL 0     vitamin C (ASCORBIC ACID) 500 MG tablet Take 500 mg by mouth daily       ROS:  10 point ROS of systems including Constitutional, Eyes, Respiratory, Cardiovascular, Gastroenterology, Genitourinary, Integumentary, Musculoskeletal, Neurological, Psychiatric were all negative except for pertinent positives noted in my HPI.    Vitals:  BP (!) 157/73   Pulse 70   Temp 97.6  F (36.4  C)   Resp 16   Ht 1.524 m (5')   Wt 59.6 kg (131 lb 6.4 oz)   SpO2 (!) 89%   BMI 25.66 kg/m     Exam:    GENERAL APPEARANCE:  Alert, pleasant and cooperative, elderly female lying in bed on exam today  HEENT: normocephalic, moist mucous membranes, nose without drainage or crusting, hearing acuity: intact  RESP:  respiratory effort normal, no respiratory distress, Lung sounds clear, patient is on RA  CV: auscultation of heart done, rate and rhythm regular. no murmur, no rub or gallop. No peripheral edema, + pedal pulse  ABDOMEN:   + bowel sounds, soft, nontender, no grimacing or guarding with palpation.  M/S:   Gait and station abnormal: NWB to RLE; Digits and nails normal, able to wiggle toes well  SKIN:  Inspection and palpation of skin and subcutaneous tissue: skin warm, dry without rashes, right knee with two small areas of drainage present  NEURO: no facial asymmetry, no speech deficits and able to follow directions, moves all extremities symmetrically  PSYCH: oriented x person, place and time; insight and judgement intact, memory baseline, affect and mood normal      Lab/Diagnostic data:  Recent labs in [facility] reviewed by me today.     ASSESSMENT/PLAN:  (S82.101D,  S82.831D) Closed fracture of proximal end of right tibia and fibula with routine healing, subsequent encounter  (primary encounter diagnosis)  (Z96.7, Z87.81) S/p ORIF  Comment: Acute, reports pain today 8/10- not at goal  -S/p ORIF to right proximal tibia fracture on 8/28/19.   Plan: Schedule tylenol TID.  Continue oxycodone PRN- to continue for shortest duration as possible. ASA for DVT prophy. NWB to RLE- should wear knee immobilizer at all times except for with bathing and ice. Bowel regimen PRN. Follow up with ortho per recommendations. Dressing to remain intact per orders.     (N39.0,  B96.89) Urinary tract infection due to ESBL Klebsiella  Comment: Acute- with reccurent UTI due to ESBL- no active infection  -completed meropenem inpatient during recent admission (discharged 8/27).  Plan: Monitor closely.    (Z86.19) Hx of Clostridium difficile infection  Comment: Acute on chronic- having 1-2 BM formed daily  Plan: Completes vanco taper today. Contact precautions until complete. Monitor closely.     (D72.829) Leukocytosis  Comment: Acute, suspected to be due to trauma and was trending down at hospital discharge- but with recent frequent infections- is afebrile and without symptoms of infection  Plan: CBC 9/4. Monitor    (I10) Essential hypertension  Comment: Chronic, BP variable since admission to TCU- Plan: Will not adjust medications today and ok for BP to be slightly above goal (<150/90) due to frailty, age and lower BPs in hospital- consider adjustments if BP remains in this range. Continue PTA amlodipine, metoprolol XL, imdur, clonidine. VS per TCU policy.      (F41.1) Generalized anxiety disorder  Comment: chronic  Plan: Continue PTA sertraline. Monitor     (Z87.81) History of compression fracture of spine  Comment: Chronic  Plan: Continue PTA lidocaine patch, gabapentin for pain. Monitor.     (D64.9) Anemia, unspecified type  Comment: Chronic, stable at baseline at last check  Hemoglobin   Date Value Ref Range Status   08/30/2019 8.0 (L) 11.7 - 15.7 g/dL Final   08/29/2019 8.1 (L) 11.7 - 15.7 g/dL Final     -baseline 8-10  Plan: CBC 9/4. Monitor for s/sx of bleeding.    (D69.6) Thrombocytopenia (H)  Comment: Chronic, within baseline at last check   Platelet Count   Date Value Ref Range Status   08/30/2019  188 150 - 450 10e9/L Final   -Baseline variable 90s-200s  Plan: CBC 9/4. Monitor for s/sx of bleeding.    (Z87.19) History of GI bleed  Comment: With possible GI bleed in 6/2019  Plan: Continue PTA omeprazole. Will hold fish oil while on ASA at TCU. Monitor    (R53.81) Physical deconditioning  Comment: Acute, secondary to recent hospitalization, medical conditions as above  Plan: Encourage participation in physical therapy/occupational therapy for strengthening and deconditioning. Discharge planning per their recommendation. Social work to assist with d/c planning.      (Z71.89) ACP (advance care planning)  Reviewed code status and POLST with patient. She would like to remain DNR with selective treatment.         Total time spent with patient visit at the skilled nursing facility was 48 minutes including patient visit, review of past records and review of admission orders, medication reconciliation, review of pharmacy recommendations. Greater than 50% of total time spent with counseling and coordinating care due to coordinating care with nurse on admission orders, coordinating care with follow up labs and appointments and counseling patient/resident for 15 minutes on: options of code status and their current code status order, current medications (treatments) reconciled from the hospital and current pain control plan and controlled substances ordered and taper plan of care.    Electronically signed by:  KARTHIK Walter CNP                        Sincerely,        KARTHIK Walter CNP

## 2019-09-04 ENCOUNTER — RECORDS - HEALTHEAST (OUTPATIENT)
Dept: LAB | Facility: CLINIC | Age: 84
End: 2019-09-04

## 2019-09-04 ENCOUNTER — TRANSFERRED RECORDS (OUTPATIENT)
Dept: HEALTH INFORMATION MANAGEMENT | Facility: CLINIC | Age: 84
End: 2019-09-04

## 2019-09-04 LAB
ANION GAP SERPL CALCULATED.3IONS-SCNC: 7 MMOL/L (ref 5–18)
ANION GAP SERPL CALCULATED.3IONS-SCNC: 7 MMOL/L (ref 5–18)
BASOPHILS # BLD AUTO: 0 THOU/UL (ref 0–0.2)
BASOPHILS NFR BLD AUTO: 1 % (ref 0–2)
BUN SERPL-MCNC: 15 MG/DL (ref 8–28)
BUN SERPL-MCNC: 15 MG/DL (ref 8–28)
CALCIUM SERPL-MCNC: 8.9 MG/DL (ref 8.5–10.5)
CALCIUM SERPL-MCNC: 8.9 MG/DL (ref 8.5–10.5)
CHLORIDE BLD-SCNC: 101 MMOL/L (ref 98–107)
CHLORIDE SERPLBLD-SCNC: 101 MMOL/L (ref 98–107)
CO2 SERPL-SCNC: 27 MMOL/L (ref 22–31)
CO2 SERPL-SCNC: 27 MMOL/L (ref 22–31)
CREAT SERPL-MCNC: 0.58 MG/DL (ref 0.6–1.1)
CREAT SERPL-MCNC: 0.58 MG/DL (ref 0.6–1.1)
DIFFERENTIAL: ABNORMAL
EOSINOPHIL # BLD AUTO: 0.2 THOU/UL (ref 0–0.4)
EOSINOPHIL NFR BLD AUTO: 3 % (ref 0–6)
ERYTHROCYTE [DISTWIDTH] IN BLOOD BY AUTOMATED COUNT: 15.9 % (ref 11–14.5)
ERYTHROCYTE [DISTWIDTH] IN BLOOD BY AUTOMATED COUNT: 15.9 % (ref 11–14.5)
GFR SERPL CREATININE-BSD FRML MDRD: >60 ML/MIN/1.73M2
GFR SERPL CREATININE-BSD FRML MDRD: >60 ML/MIN/1.73M2
GLUCOSE BLD-MCNC: 86 MG/DL (ref 70–125)
GLUCOSE SERPL-MCNC: 86 MG/DL (ref 70–125)
HCT VFR BLD AUTO: 22.3 % (ref 35–47)
HCT VFR BLD AUTO: 22.3 % (ref 35–47)
HEMOGLOBIN: 7.2 G/DL (ref 12–16)
HGB BLD-MCNC: 7.2 G/DL (ref 12–16)
LYMPHOCYTES # BLD AUTO: 1.1 THOU/UL (ref 0.8–4.4)
LYMPHOCYTES NFR BLD AUTO: 17 % (ref 20–40)
MCH RBC QN AUTO: 31 PG (ref 27–34)
MCH RBC QN AUTO: 31 PG (ref 27–34)
MCHC RBC AUTO-ENTMCNC: 32.3 G/DL (ref 32–36)
MCHC RBC AUTO-ENTMCNC: 32.3 G/DL (ref 32–36)
MCV RBC AUTO: 96 FL (ref 80–100)
MCV RBC AUTO: 96 FL (ref 80–100)
MONOCYTES # BLD AUTO: 0.8 THOU/UL (ref 0–0.9)
MONOCYTES NFR BLD AUTO: 12 % (ref 2–10)
NEUTROPHILS # BLD AUTO: 4.2 THOU/UL (ref 2–7.7)
NEUTROPHILS NFR BLD AUTO: 64 % (ref 50–70)
PLATELET # BLD AUTO: 241 THOU/UL (ref 140–440)
PLATELET # BLD AUTO: 241 THOU/UL (ref 140–440)
PMV BLD AUTO: 10.8 FL (ref 8.5–12.5)
POTASSIUM BLD-SCNC: 3.7 MMOL/L (ref 3.5–5)
POTASSIUM SERPL-SCNC: 3.7 MMOL/L (ref 3.5–5)
RBC # BLD AUTO: 2.32 MILL/UL (ref 3.8–5.4)
RBC # BLD AUTO: 2.32 MILL/UL (ref 3.8–5.4)
SODIUM SERPL-SCNC: 135 MMOL/L (ref 136–145)
SODIUM SERPL-SCNC: 135 MMOL/L (ref 136–145)
WBC # BLD AUTO: 6.7 THOU/UL (ref 4–11)
WBC: 6.7 THOU/UL (ref 4–11)

## 2019-09-05 ENCOUNTER — TRANSFERRED RECORDS (OUTPATIENT)
Dept: HEALTH INFORMATION MANAGEMENT | Facility: CLINIC | Age: 84
End: 2019-09-05

## 2019-09-05 LAB
HEMOGLOBIN: 7.7 G/DL (ref 12–16)
HGB BLD-MCNC: 7.7 G/DL (ref 12–16)

## 2019-09-06 ENCOUNTER — RECORDS - HEALTHEAST (OUTPATIENT)
Dept: LAB | Facility: CLINIC | Age: 84
End: 2019-09-06

## 2019-09-07 ENCOUNTER — TRANSFERRED RECORDS (OUTPATIENT)
Dept: HEALTH INFORMATION MANAGEMENT | Facility: CLINIC | Age: 84
End: 2019-09-07

## 2019-09-07 ENCOUNTER — TELEPHONE (OUTPATIENT)
Dept: GERIATRICS | Facility: CLINIC | Age: 84
End: 2019-09-07

## 2019-09-07 LAB
HEMOGLOBIN: 8.1 G/DL (ref 12–16)
HGB BLD-MCNC: 8.1 G/DL (ref 12–16)

## 2019-09-08 ENCOUNTER — RECORDS - HEALTHEAST (OUTPATIENT)
Dept: LAB | Facility: CLINIC | Age: 84
End: 2019-09-08

## 2019-09-08 NOTE — TELEPHONE ENCOUNTER
Nursing called that Daya was not going to get his HgB drawn today due to lab will not come out for it.      Staff asking if it could be done Monday.  No visible symptoms like SOB, bleeding etc    Ok to draw on Monday since lab will not come out.    Electronically signed by Frannie Morirs RN, CNP

## 2019-09-09 ENCOUNTER — NURSING HOME VISIT (OUTPATIENT)
Dept: GERIATRICS | Facility: CLINIC | Age: 84
End: 2019-09-09
Payer: MEDICARE

## 2019-09-09 ENCOUNTER — TRANSFERRED RECORDS (OUTPATIENT)
Dept: HEALTH INFORMATION MANAGEMENT | Facility: CLINIC | Age: 84
End: 2019-09-09

## 2019-09-09 VITALS
TEMPERATURE: 97 F | SYSTOLIC BLOOD PRESSURE: 150 MMHG | WEIGHT: 126.4 LBS | DIASTOLIC BLOOD PRESSURE: 76 MMHG | HEIGHT: 60 IN | HEART RATE: 64 BPM | RESPIRATION RATE: 17 BRPM | BODY MASS INDEX: 24.81 KG/M2 | OXYGEN SATURATION: 92 %

## 2019-09-09 DIAGNOSIS — S82.101D CLOSED FRACTURE OF PROXIMAL END OF RIGHT TIBIA AND FIBULA WITH ROUTINE HEALING, SUBSEQUENT ENCOUNTER: Primary | ICD-10-CM

## 2019-09-09 DIAGNOSIS — F41.1 GENERALIZED ANXIETY DISORDER: ICD-10-CM

## 2019-09-09 DIAGNOSIS — Z98.890 S/P ORIF (OPEN REDUCTION INTERNAL FIXATION) FRACTURE: ICD-10-CM

## 2019-09-09 DIAGNOSIS — Z87.19 HISTORY OF GI BLEED: ICD-10-CM

## 2019-09-09 DIAGNOSIS — R53.81 PHYSICAL DECONDITIONING: ICD-10-CM

## 2019-09-09 DIAGNOSIS — D69.6 THROMBOCYTOPENIA (H): ICD-10-CM

## 2019-09-09 DIAGNOSIS — Z87.81 S/P ORIF (OPEN REDUCTION INTERNAL FIXATION) FRACTURE: ICD-10-CM

## 2019-09-09 DIAGNOSIS — S82.831D CLOSED FRACTURE OF PROXIMAL END OF RIGHT TIBIA AND FIBULA WITH ROUTINE HEALING, SUBSEQUENT ENCOUNTER: Primary | ICD-10-CM

## 2019-09-09 DIAGNOSIS — Z87.81 HISTORY OF COMPRESSION FRACTURE OF SPINE: ICD-10-CM

## 2019-09-09 DIAGNOSIS — I10 ESSENTIAL HYPERTENSION: ICD-10-CM

## 2019-09-09 DIAGNOSIS — D64.9 ANEMIA, UNSPECIFIED TYPE: ICD-10-CM

## 2019-09-09 LAB
HEMOGLOBIN: 8.3 G/DL (ref 12–16)
HGB BLD-MCNC: 8.3 G/DL (ref 12–16)

## 2019-09-09 PROCEDURE — 99309 SBSQ NF CARE MODERATE MDM 30: CPT | Performed by: NURSE PRACTITIONER

## 2019-09-09 RX ORDER — DOCUSATE SODIUM 100 MG/1
100 CAPSULE, LIQUID FILLED ORAL AT BEDTIME
Status: ON HOLD
Start: 2019-09-09 | End: 2021-08-15

## 2019-09-09 RX ORDER — AMLODIPINE BESYLATE 10 MG/1
10 TABLET ORAL AT BEDTIME
Qty: 90 TABLET | Refills: 1
Start: 2019-09-09 | End: 2019-10-25

## 2019-09-09 ASSESSMENT — MIFFLIN-ST. JEOR: SCORE: 924.85

## 2019-09-09 NOTE — PROGRESS NOTES
[facility]  [place] [facility] Record Number:  [id number]  Place of Service where encounter took place:  [facility] ([facility]) [[id number]]  Chief Complaint   Patient presents with     RECHECK       HPI:    Daya Ignacio  is a 88 year old (10/11/1930), who is being seen today for an episodic care visit.  HPI information obtained from: facility chart records, facility staff, patient report and [facility] chart review.   Brief Summary of Hospital Course: Ms. Daya Ignacio is an 88 year old female with PMH of HTN, lumbar compression fractures, upper GI bleed, generalized anxiety disorder, recurrent ESBL UTI and c.diff infection. Patient returned to ED with fall after discharge from the hospital 12 hours earlier after being admitted for UTI for 6 days. Per hospital notes she was walking in her kitchen and became unwell, fell and instantly had pain in her right leg. S/p ORIF to right proximal tibia fracture on  8/28/19. Is NWB to RLE- should wear knee immobilizer at all times except for with bathing and ice. Developed leukocytosis, but suspect secondary to trauma. With multiple recurrent hospitalizations this past year for ESBL UTI and c.difficile. Is on c.diff taper through 9/3. Discharged to TCU.     Today's concern is:  Ms. Ignacio was seen today for routine follow up at TCU.  Reports today pain overall is improving- rates 3/10 from knee and down. Has good sensation to feet, able to wiggle toes.     Reports bowels moving about daily- would like stool softener- but not laxative. Reports she thinks stools are formed- per nursing notes they are not documented as loose. No reports of black stools or blood in stool (patient is using bed pan so not able to see)    Denies dysuria, SOB, CP, dizziness/lightheadedness, abdominal pain or cramping.    Reviewed VS at TCU today    Wt Readings from Last 4 Encounters:   09/09/19 57.3 kg (126 lb 6.4 oz)   09/03/19 59.6 kg (131 lb 6.4 oz)    08/28/19 56.2 kg (124 lb)   08/22/19 56.5 kg (124 lb 9.6 oz)     Past Medical and Surgical History reviewed in Epic today.    MEDICATIONS:  Current Outpatient Medications   Medication Sig Dispense Refill     acetaminophen (TYLENOL) 500 MG tablet Take 2 tablets (1,000 mg) by mouth 3 times daily       amLODIPine (NORVASC) 10 MG tablet Take 1 tablet (10 mg) by mouth At Bedtime 90 tablet 1     aspirin (ASA) 325 MG EC tablet Take 1 tablet (325 mg) by mouth daily 30 tablet 0     Calcium Carbonate (CALCIUM 600 PO) Take 1 tablet by mouth 2 times daily        Cholecalciferol (VITAMIN D3) 2000 UNITS CAPS Take 2,000 Units by mouth daily (with dinner)       cloNIDine (CATAPRES) 0.1 MG tablet TAKE ONE TABLET BY MOUTH TWO TIMES A  tablet 0     docusate sodium (COLACE) 100 MG capsule Take 1 capsule (100 mg) by mouth At Bedtime       estradiol (ESTRACE) 0.1 MG/GM vaginal cream Place 2 g vaginally three times a week paraben-free 6 g 3     gabapentin (NEURONTIN) 100 MG capsule Take 2 capsules (200 mg) by mouth 2 times daily 120 capsule 0     isosorbide dinitrate (ISORDIL) 10 MG tablet Take 1 tablet (10 mg) by mouth 3 times daily 270 tablet 3     Lidocaine (LIDOCARE) 4 % Patch Apply to low back topically one time a day for pain and remove per schedule as needed.       metoprolol succinate ER (TOPROL-XL) 100 MG 24 hr tablet Take 1 tablet (100 mg) by mouth daily 90 tablet 1     Multiple Vitamins-Minerals (PRESERVISION AREDS) CAPS Take 1 capsule by mouth 2 times daily        omeprazole (PRILOSEC) 20 MG DR capsule Take 1 capsule (20 mg) by mouth every morning (before breakfast) 30 capsule 0     ondansetron (ZOFRAN-ODT) 4 MG ODT tab Take 1 tablet (4 mg) by mouth every 6 hours as needed for nausea or vomiting       oxyCODONE (ROXICODONE) 5 MG tablet Take 0.5 tablets (2.5 mg) by mouth every 4 hours as needed for severe pain 10 tablet 0     polyethylene glycol (MIRALAX/GLYCOLAX) packet Take 17 g by mouth daily as needed for  constipation       senna-docusate (SENOKOT-S/PERICOLACE) 8.6-50 MG tablet Take 2 tablets by mouth 2 times daily as needed for constipation       sertraline (ZOLOFT) 100 MG tablet Take 1 tablet (100 mg) by mouth daily 90 tablet 1     vitamin C (ASCORBIC ACID) 500 MG tablet Take 500 mg by mouth daily       fish oil-omega-3 fatty acids 1000 MG capsule Take 1 g by mouth daily         REVIEW OF SYSTEMS:  5 point ROS of systems including Respiratory, Cardiovascular, Gastroenterology, Genitourinary,  Musculoskeletal were all negative except for pertinent positives noted in my HPI.    Objective:  BP (!) 150/76   Pulse 64   Temp 97  F (36.1  C)   Resp 17   Ht 1.524 m (5')   Wt 57.3 kg (126 lb 6.4 oz)   SpO2 92%   BMI 24.69 kg/m    Exam:  GENERAL APPEARANCE:  Alert, pleasant and cooperative, elderly female lying in bed on exam today  HEENT: normocephalic, moist mucous membranes, nose without drainage or crusting, hearing acuity: intact  RESP:  respiratory effort normal, no respiratory distress, Lung sounds clear, patient is on RA  CV: auscultation of heart done, rate and rhythm regular. no murmur, no rub or gallop. No peripheral edema, + pedal pulse  ABDOMEN:  + bowel sounds, soft, nontender, no grimacing or guarding with palpation.  M/S:   Gait and station abnormal: NWB to RLE; Digits and nails normal, able to wiggle toes well  PSYCH: insight and judgement intact, memory baseline, affect and mood normal    Labs:   Labs done in SNF are in [place]. Please refer to them using EPIC/Care Everywhere. and Recent labs in [facility] reviewed by me today.     ASSESSMENT/PLAN:  (S82.101D,  S82.831D) Closed fracture of proximal end of right tibia and fibula with routine healing, subsequent encounter  (primary encounter diagnosis)  (Z96.7, Z87.81) S/p ORIF  Comment: Acute, reports pain today 3/10-slowly improving- using oxy less frequently than on admission- now about once daily-but not at goal  -S/p ORIF to right proximal tibia  fracture on 8/28/19.   Plan: Schedule tylenol TID. Continue oxycodone PRN- to continue for shortest duration as possible. ASA for DVT prophy. NWB to RLE- should wear knee immobilizer at all times except for with bathing and ice. Bowel regimen PRN- would like stool softener scheduled. Follow up with ortho tomorrow. Dressing to remain intact per orders.      (I10) Essential hypertension  Comment: Chronic, BP not meeting goal: -159, outlier: 179  Plan:. Change PTA amlodipine to be administered at bedtime. Continue PTA metoprolol XL, imdur, clonidine. Notify provider if SBP >150/90 x3 consecutive BPs. VS per TCU policy.      (D64.9) Anemia, unspecified type  Comment: Chronic, hgb lower than baseline-suspected to be due to recent surgery- no blood in urine or stool  Hemoglobin   Date Value Ref Range Status   09/05/2019 7.7 (A) 12.0 - 16.0 g/dL Final   09/04/2019 7.2 (A) 12.0 - 16.0 g/dL Final     -baseline 8-10  Plan: Hgb pending today. Monitor for s/sx of bleeding.    (F41.1) Generalized anxiety disorder  Comment: chronic  Plan: Continue PTA sertraline. Monitor      (Z87.81) History of compression fracture of spine  Comment: Chronic  Plan: Continue PTA lidocaine patch, gabapentin for pain. Monitor.      (D69.6) Thrombocytopenia (H)  Comment: Chronic, above baseline at last check   -Baseline variable 90s-200s  Platelet Count   Date Value Ref Range Status   09/04/2019 241 140 - 440 thou/uL Final     Plan: CBC PRN. Monitor for s/sx of bleeding.     (Z87.19) History of GI bleed  Comment: With possible GI bleed in 6/2019-no blood in stool or dark stool per nursing  Plan: Continue PTA omeprazole. Will hold fish oil while on ASA at TCU. Monitor     (R53.81) Physical deconditioning  Comment: Acute, secondary to recent hospitalization, medical conditions as above- working on slideboard transfers currently  Plan: Encourage participation in physical therapy/occupational therapy for strengthening and deconditioning.  Discharge planning per their recommendation. Social work to assist with d/c planning.     (Z83.966) Leukocytosis  Comment: Acute, resolved- WBC WNL at recheck- suspected to be due to trauma   Plan: CBC PRN. Monitor    (Z86.19) Hx of Clostridium difficile infection  Comment: Acute on chronic- reports bowels are formed to her knowledge and noted in nursing documentation   Plan: Completed vanco taper.  Monitor for reoccurence     (N39.0,  B96.89) Urinary tract infection due to ESBL Klebsiella  Comment: Acute- with reccurent UTI due to ESBL- no active infection  -completed meropenem inpatient during recent admission (discharged 8/27).  Plan: Monitor closely.      Electronically signed by:  KARTHIK Walter CNP

## 2019-09-09 NOTE — LETTER
9/9/2019        RE: Daya Ignacio  [address]  [facility] [address]        [facility]  [place] [facility] Record Number:  [id number]  Place of Service where encounter took place:  [facility] ([facility]) [[id number]]  Chief Complaint   Patient presents with     RECHECK       HPI:    Daya Ignacio  is a 88 year old (10/11/1930), who is being seen today for an episodic care visit.  HPI information obtained from: facility chart records, facility staff, patient report and [facility] chart review.   Brief Summary of Hospital Course: Ms. Daya Ignacio is an 88 year old female with PMH of HTN, lumbar compression fractures, upper GI bleed, generalized anxiety disorder, recurrent ESBL UTI and c.diff infection. Patient returned to ED with fall after discharge from the hospital 12 hours earlier after being admitted for UTI for 6 days. Per hospital notes she was walking in her kitchen and became unwell, fell and instantly had pain in her right leg. S/p ORIF to right proximal tibia fracture on  8/28/19. Is NWB to RLE- should wear knee immobilizer at all times except for with bathing and ice. Developed leukocytosis, but suspect secondary to trauma. With multiple recurrent hospitalizations this past year for ESBL UTI and c.difficile. Is on c.diff taper through 9/3. Discharged to TCU.     Today's concern is:  Ms. Ignacio was seen today for routine follow up at TCU.  Reports today pain overall is improving- rates 3/10 from knee and down. Has good sensation to feet, able to wiggle toes.     Reports bowels moving about daily- would like stool softener- but not laxative. Reports she thinks stools are formed- per nursing notes they are not documented as loose. No reports of black stools or blood in stool (patient is using bed pan so not able to see)    Denies dysuria, SOB, CP, dizziness/lightheadedness, abdominal pain or cramping.    Reviewed VS at TCU today    Wt  Readings from Last 4 Encounters:   09/09/19 57.3 kg (126 lb 6.4 oz)   09/03/19 59.6 kg (131 lb 6.4 oz)   08/28/19 56.2 kg (124 lb)   08/22/19 56.5 kg (124 lb 9.6 oz)     Past Medical and Surgical History reviewed in Epic today.    MEDICATIONS:  Current Outpatient Medications   Medication Sig Dispense Refill     acetaminophen (TYLENOL) 500 MG tablet Take 2 tablets (1,000 mg) by mouth 3 times daily       amLODIPine (NORVASC) 10 MG tablet Take 1 tablet (10 mg) by mouth At Bedtime 90 tablet 1     aspirin (ASA) 325 MG EC tablet Take 1 tablet (325 mg) by mouth daily 30 tablet 0     Calcium Carbonate (CALCIUM 600 PO) Take 1 tablet by mouth 2 times daily        Cholecalciferol (VITAMIN D3) 2000 UNITS CAPS Take 2,000 Units by mouth daily (with dinner)       cloNIDine (CATAPRES) 0.1 MG tablet TAKE ONE TABLET BY MOUTH TWO TIMES A  tablet 0     docusate sodium (COLACE) 100 MG capsule Take 1 capsule (100 mg) by mouth At Bedtime       estradiol (ESTRACE) 0.1 MG/GM vaginal cream Place 2 g vaginally three times a week paraben-free 6 g 3     gabapentin (NEURONTIN) 100 MG capsule Take 2 capsules (200 mg) by mouth 2 times daily 120 capsule 0     isosorbide dinitrate (ISORDIL) 10 MG tablet Take 1 tablet (10 mg) by mouth 3 times daily 270 tablet 3     Lidocaine (LIDOCARE) 4 % Patch Apply to low back topically one time a day for pain and remove per schedule as needed.       metoprolol succinate ER (TOPROL-XL) 100 MG 24 hr tablet Take 1 tablet (100 mg) by mouth daily 90 tablet 1     Multiple Vitamins-Minerals (PRESERVISION AREDS) CAPS Take 1 capsule by mouth 2 times daily        omeprazole (PRILOSEC) 20 MG DR capsule Take 1 capsule (20 mg) by mouth every morning (before breakfast) 30 capsule 0     ondansetron (ZOFRAN-ODT) 4 MG ODT tab Take 1 tablet (4 mg) by mouth every 6 hours as needed for nausea or vomiting       oxyCODONE (ROXICODONE) 5 MG tablet Take 0.5 tablets (2.5 mg) by mouth every 4 hours as needed for severe pain 10  tablet 0     polyethylene glycol (MIRALAX/GLYCOLAX) packet Take 17 g by mouth daily as needed for constipation       senna-docusate (SENOKOT-S/PERICOLACE) 8.6-50 MG tablet Take 2 tablets by mouth 2 times daily as needed for constipation       sertraline (ZOLOFT) 100 MG tablet Take 1 tablet (100 mg) by mouth daily 90 tablet 1     vitamin C (ASCORBIC ACID) 500 MG tablet Take 500 mg by mouth daily       fish oil-omega-3 fatty acids 1000 MG capsule Take 1 g by mouth daily         REVIEW OF SYSTEMS:  5 point ROS of systems including Respiratory, Cardiovascular, Gastroenterology, Genitourinary,  Musculoskeletal were all negative except for pertinent positives noted in my HPI.    Objective:  BP (!) 150/76   Pulse 64   Temp 97  F (36.1  C)   Resp 17   Ht 1.524 m (5')   Wt 57.3 kg (126 lb 6.4 oz)   SpO2 92%   BMI 24.69 kg/m     Exam:  GENERAL APPEARANCE:  Alert, pleasant and cooperative, elderly female lying in bed on exam today  HEENT: normocephalic, moist mucous membranes, nose without drainage or crusting, hearing acuity: intact  RESP:  respiratory effort normal, no respiratory distress, Lung sounds clear, patient is on RA  CV: auscultation of heart done, rate and rhythm regular. no murmur, no rub or gallop. No peripheral edema, + pedal pulse  ABDOMEN:  + bowel sounds, soft, nontender, no grimacing or guarding with palpation.  M/S:   Gait and station abnormal: NWB to RLE; Digits and nails normal, able to wiggle toes well  PSYCH: insight and judgement intact, memory baseline, affect and mood normal    Labs:   Labs done in SNF are in [place] EPIC. Please refer to them using [facility]/Care Everywhere. and Recent labs in EPIC reviewed by me today.     ASSESSMENT/PLAN:  (S82.101D,  S82.581D) Closed fracture of proximal end of right tibia and fibula with routine healing, subsequent encounter  (primary encounter diagnosis)  (Z96.7, Z87.81) S/p ORIF  Comment: Acute, reports pain today 3/10-slowly improving- using oxy less  frequently than on admission- now about once daily-but not at goal  -S/p ORIF to right proximal tibia fracture on 8/28/19.   Plan: Schedule tylenol TID. Continue oxycodone PRN- to continue for shortest duration as possible. ASA for DVT prophy. NWB to RLE- should wear knee immobilizer at all times except for with bathing and ice. Bowel regimen PRN- would like stool softener scheduled. Follow up with ortho tomorrow. Dressing to remain intact per orders.      (I10) Essential hypertension  Comment: Chronic, BP not meeting goal: -159, outlier: 179  Plan:. Change PTA amlodipine to be administered at bedtime. Continue PTA metoprolol XL, imdur, clonidine. Notify provider if SBP >150/90 x3 consecutive BPs. VS per TCU policy.      (D64.9) Anemia, unspecified type  Comment: Chronic,  hgb lower than baseline-suspected to be due to recent surgery- no blood in urine or stool  Hemoglobin   Date Value Ref Range Status   09/05/2019 7.7 (A) 12.0 - 16.0 g/dL Final   09/04/2019 7.2 (A) 12.0 - 16.0 g/dL Final     -baseline 8-10  Plan:  Hgb pending today. Monitor for s/sx of bleeding.    (F41.1) Generalized anxiety disorder  Comment: chronic  Plan: Continue PTA sertraline. Monitor      (Z87.81) History of compression fracture of spine  Comment: Chronic  Plan: Continue PTA lidocaine patch, gabapentin for pain. Monitor.      (D69.6) Thrombocytopenia (H)  Comment: Chronic,  above baseline at last check   -Baseline variable 90s-200s  Platelet Count   Date Value Ref Range Status   09/04/2019 241 140 - 440 thou/uL Final     Plan: CBC  PRN. Monitor for s/sx of bleeding.     (Z87.19) History of GI bleed  Comment: With possible GI bleed in 6/2019-no blood in stool or dark stool per nursing  Plan: Continue PTA omeprazole. Will hold fish oil while on ASA at TCU. Monitor     (R53.81) Physical deconditioning  Comment: Acute, secondary to recent hospitalization, medical conditions as above- working on slideboard transfers currently  Plan:  Encourage participation in physical therapy/occupational therapy for strengthening and deconditioning. Discharge planning per their recommendation. Social work to assist with d/c planning.     (D72.829) Leukocytosis  Comment: Acute, resolved- WBC WNL at recheck- suspected to be due to trauma   Plan: CBC PRN. Monitor    (Z86.19) Hx of Clostridium difficile infection  Comment: Acute on chronic- reports bowels are formed to her knowledge and noted in nursing documentation   Plan: Complete d vanco taper.  Monitor for reoccurence     (N39.0,  B96.89) Urinary tract infection due to ESBL Klebsiella  Comment: Acute- with reccurent UTI due to ESBL- no active infection  -completed meropenem inpatient during recent admission (discharged 8/27).  Plan: Monitor closely.      Electronically signed by:  KARTHIK Walter CNP           Sincerely,        KARTHIK Walter CNP

## 2019-09-10 ENCOUNTER — TRANSFERRED RECORDS (OUTPATIENT)
Dept: HEALTH INFORMATION MANAGEMENT | Facility: CLINIC | Age: 84
End: 2019-09-10

## 2019-09-10 ASSESSMENT — MIFFLIN-ST. JEOR: SCORE: 933.01

## 2019-09-11 DIAGNOSIS — S82.401A TIBIA/FIBULA FRACTURE, RIGHT, CLOSED, INITIAL ENCOUNTER: ICD-10-CM

## 2019-09-11 DIAGNOSIS — S82.201A TIBIA/FIBULA FRACTURE, RIGHT, CLOSED, INITIAL ENCOUNTER: ICD-10-CM

## 2019-09-11 RX ORDER — OXYCODONE HYDROCHLORIDE 5 MG/1
2.5 TABLET ORAL EVERY 4 HOURS PRN
Qty: 15 TABLET | Refills: 0 | Status: SHIPPED | OUTPATIENT
Start: 2019-09-11 | End: 2019-10-16

## 2019-09-13 ENCOUNTER — RECORDS - HEALTHEAST (OUTPATIENT)
Dept: LAB | Facility: CLINIC | Age: 84
End: 2019-09-13

## 2019-09-13 ENCOUNTER — TRANSFERRED RECORDS (OUTPATIENT)
Dept: HEALTH INFORMATION MANAGEMENT | Facility: CLINIC | Age: 84
End: 2019-09-13

## 2019-09-13 LAB
ALBUMIN UR-MCNC: NEGATIVE MG/DL
AMORPH CRY #/AREA URNS HPF: ABNORMAL /[HPF]
APPEARANCE UR: ABNORMAL
BACTERIA #/AREA URNS HPF: ABNORMAL HPF
BILIRUB UR QL STRIP: NEGATIVE
COLOR UR AUTO: YELLOW
GLUCOSE UR STRIP-MCNC: NEGATIVE MG/DL
HGB UR QL STRIP: NEGATIVE
KETONES UR STRIP-MCNC: NEGATIVE MG/DL
LEUKOCYTE ESTERASE UR QL STRIP: ABNORMAL
NITRATE UR QL: POSITIVE
PH UR STRIP: 7.5 [PH] (ref 4.5–8)
RBC #/AREA URNS AUTO: ABNORMAL HPF
SP GR UR STRIP: 1.01 (ref 1–1.03)
SQUAMOUS #/AREA URNS AUTO: ABNORMAL LPF
UROBILINOGEN UR STRIP-ACNC: ABNORMAL
WBC #/AREA URNS AUTO: ABNORMAL HPF

## 2019-09-15 ENCOUNTER — TELEPHONE (OUTPATIENT)
Dept: GERIATRICS | Facility: CLINIC | Age: 84
End: 2019-09-15

## 2019-09-15 LAB — BACTERIA SPEC CULT: ABNORMAL

## 2019-09-15 NOTE — TELEPHONE ENCOUNTER
UC results back with sensitivities, patient has a Hx of past UTI's and also Hx of C-diff, she is afebrile today and vitals are stable but c/o dysuria and lethargy.   Sensitivities to Cipro and patient does not have an allergy to cipro  Order: cipro 250mg BID for 7 days, vancomycin 125mg TID for 10 days as prophylaxis, and update NP in AM

## 2019-09-16 ENCOUNTER — NURSING HOME VISIT (OUTPATIENT)
Dept: GERIATRICS | Facility: CLINIC | Age: 84
End: 2019-09-16
Payer: MEDICARE

## 2019-09-16 ENCOUNTER — RECORDS - HEALTHEAST (OUTPATIENT)
Dept: LAB | Facility: CLINIC | Age: 84
End: 2019-09-16

## 2019-09-16 VITALS
OXYGEN SATURATION: 98 % | SYSTOLIC BLOOD PRESSURE: 146 MMHG | DIASTOLIC BLOOD PRESSURE: 72 MMHG | BODY MASS INDEX: 25.17 KG/M2 | WEIGHT: 128.2 LBS | HEART RATE: 61 BPM | RESPIRATION RATE: 18 BRPM | TEMPERATURE: 98.1 F | HEIGHT: 60 IN

## 2019-09-16 DIAGNOSIS — I10 ESSENTIAL HYPERTENSION: ICD-10-CM

## 2019-09-16 DIAGNOSIS — Z86.19 HX OF CLOSTRIDIUM DIFFICILE INFECTION: ICD-10-CM

## 2019-09-16 DIAGNOSIS — S82.831D CLOSED FRACTURE OF PROXIMAL END OF RIGHT TIBIA AND FIBULA WITH ROUTINE HEALING, SUBSEQUENT ENCOUNTER: ICD-10-CM

## 2019-09-16 DIAGNOSIS — S82.101D CLOSED FRACTURE OF PROXIMAL END OF RIGHT TIBIA AND FIBULA WITH ROUTINE HEALING, SUBSEQUENT ENCOUNTER: ICD-10-CM

## 2019-09-16 DIAGNOSIS — Z87.81 S/P ORIF (OPEN REDUCTION INTERNAL FIXATION) FRACTURE: ICD-10-CM

## 2019-09-16 DIAGNOSIS — Z87.19 HISTORY OF GI BLEED: ICD-10-CM

## 2019-09-16 DIAGNOSIS — R53.81 PHYSICAL DECONDITIONING: ICD-10-CM

## 2019-09-16 DIAGNOSIS — D64.9 ANEMIA, UNSPECIFIED TYPE: ICD-10-CM

## 2019-09-16 DIAGNOSIS — Z98.890 S/P ORIF (OPEN REDUCTION INTERNAL FIXATION) FRACTURE: ICD-10-CM

## 2019-09-16 DIAGNOSIS — N39.0 URINARY TRACT INFECTION WITHOUT HEMATURIA, SITE UNSPECIFIED: Primary | ICD-10-CM

## 2019-09-16 PROCEDURE — 99309 SBSQ NF CARE MODERATE MDM 30: CPT | Performed by: NURSE PRACTITIONER

## 2019-09-16 RX ORDER — CIPROFLOXACIN 250 MG/1
250 TABLET, FILM COATED ORAL 2 TIMES DAILY
COMMUNITY
End: 2019-09-23

## 2019-09-16 RX ORDER — VANCOMYCIN HYDROCHLORIDE 125 MG/1
125 CAPSULE ORAL 3 TIMES DAILY
COMMUNITY
Start: 2019-09-15 | End: 2019-09-26

## 2019-09-16 RX ORDER — METOPROLOL SUCCINATE 100 MG/1
50 TABLET, EXTENDED RELEASE ORAL 2 TIMES DAILY
Qty: 90 TABLET | Refills: 1
Start: 2019-09-16 | End: 2019-09-26

## 2019-09-16 NOTE — PROGRESS NOTES
"[facility]  [place] Medical Record Number:  [id number]  Place of Service where encounter took place:  [facility] ([facility]) [[id number]]  Chief Complaint   Patient presents with     RECHECK       HPI:    Daya Ignacio  is a 88 year old (10/11/1930), who is being seen today for an episodic care visit.  HPI information obtained from: facility chart records, facility staff, patient report and [facility] chart review.     Brief Summary of Hospital/ TCU Course: Ms. Daya Ignacio is an 88 year old female with PMH of HTN, lumbar compression fractures, upper GI bleed, generalized anxiety disorder, recurrent ESBL UTI and c.diff infection. Patient returned to ED with fall after discharge from the hospital 12 hours earlier after being admitted for UTI for 6 days. Per hospital notes she was walking in her kitchen and became unwell, fell and instantly had pain in her right leg. S/p ORIF to right proximal tibia fracture on  8/28/19. Is NWB to RLE- should wear knee immobilizer at all times except for with bathing and ice. Developed leukocytosis, but suspect secondary to trauma. With multiple recurrent hospitalizations this past year for ESBL UTI and c.difficile. Completed c.diff taper 9/3. Discharged to TCU.    At TCU Ms. Ignacio was found to UTI from Klebsiella pneumoniae. Treated with 7 days of cipro (completes 9/21). While on antibiotics she received oral vanco prophy.     Today's concern is:  Ms. Ignacio was seen today for routine follow up at TCU. She developed urinary urgency and was found to have UTI. UC + >100,000 copies of Klebsiella pneumoniae. She has remained afebrile. Denies dysuria. Was started on cipro yesterday- has not noticed much improvement in her symptoms yet.     Denies any loose stools, nausea, abdominal pain or cramping. Nursing staff documenting stools as brown, soft, formed.    Reports pain to right knee a little worse than a \"3/10\". Overall pain is well " managed. Had ortho appointment last week and her sutures were removed. She also has new knee brace. She remains NWB to [facility]. Is using oxycodone about one tap every other day, which is less use from previous week.     Nursing staff today reports that her ulcer to her back is healed.     Denies CP, SOB, dizziness/lightheadedness. No blood in stool or urine per nursing staff documentation- patient still using bedpan.    Reviewed VS at TCU today    Wt Readings from Last 4 Encounters:   09/10/19 58.2 kg (128 lb 3.2 oz)   09/09/19 57.3 kg (126 lb 6.4 oz)   09/03/19 59.6 kg (131 lb 6.4 oz)   08/28/19 56.2 kg (124 lb)       Past Medical and Surgical History reviewed in Epic today.    MEDICATIONS:  Current Outpatient Medications   Medication Sig Dispense Refill     acetaminophen (TYLENOL) 500 MG tablet Take 2 tablets (1,000 mg) by mouth 3 times daily       amLODIPine (NORVASC) 10 MG tablet Take 1 tablet (10 mg) by mouth At Bedtime 90 tablet 1     aspirin (ASA) 325 MG EC tablet Take 1 tablet (325 mg) by mouth daily 30 tablet 0     Calcium Carbonate (CALCIUM 600 PO) Take 1 tablet by mouth 2 times daily        Cholecalciferol (VITAMIN D3) 2000 UNITS CAPS Take 2,000 Units by mouth daily (with dinner)       ciprofloxacin (CIPRO) 250 MG tablet Take 250 mg by mouth 2 times daily       cloNIDine (CATAPRES) 0.1 MG tablet TAKE ONE TABLET BY MOUTH TWO TIMES A  tablet 0     docusate sodium (COLACE) 100 MG capsule Take 1 capsule (100 mg) by mouth At Bedtime       estradiol (ESTRACE) 0.1 MG/GM vaginal cream Place 2 g vaginally three times a week paraben-free 6 g 3     gabapentin (NEURONTIN) 100 MG capsule Take 2 capsules (200 mg) by mouth 2 times daily 120 capsule 0     isosorbide dinitrate (ISORDIL) 10 MG tablet Take 1 tablet (10 mg) by mouth 3 times daily 270 tablet 3     Lidocaine (LIDOCARE) 4 % Patch Apply to low back topically one time a day for pain and remove per schedule as needed.       metoprolol succinate ER (TOPROL-XL)  100 MG 24 hr tablet Take 0.5 tablets (50 mg) by mouth 2 times daily 90 tablet 1     Multiple Vitamins-Minerals (PRESERVISION AREDS) CAPS Take 1 capsule by mouth 2 times daily        omeprazole (PRILOSEC) 20 MG DR capsule Take 1 capsule (20 mg) by mouth every morning (before breakfast) 30 capsule 0     ondansetron (ZOFRAN-ODT) 4 MG ODT tab Take 1 tablet (4 mg) by mouth every 6 hours as needed for nausea or vomiting       oxyCODONE (ROXICODONE) 5 MG tablet Take 0.5 tablets (2.5 mg) by mouth every 4 hours as needed for severe pain 15 tablet 0     polyethylene glycol (MIRALAX/GLYCOLAX) packet Take 17 g by mouth daily as needed for constipation       senna-docusate (SENOKOT-S/PERICOLACE) 8.6-50 MG tablet Take 2 tablets by mouth 2 times daily as needed for constipation       sertraline (ZOLOFT) 100 MG tablet Take 1 tablet (100 mg) by mouth daily 90 tablet 1     vancomycin (VANCOCIN) 125 MG capsule Take 125 mg by mouth 3 times daily       vitamin C (ASCORBIC ACID) 500 MG tablet Take 500 mg by mouth daily       REVIEW OF SYSTEMS:  5 point ROS of systems including Respiratory, Cardiovascular, Gastroenterology, Genitourinary,  Musculoskeletal were all negative except for pertinent positives noted in my HPI.    Objective:  BP (!) 146/72   Pulse 61   Temp 98.1  F (36.7  C)   Resp 18   Ht 1.524 m (5')   Wt 58.2 kg (128 lb 3.2 oz)   SpO2 98%   BMI 25.04 kg/m    Exam:  GENERAL APPEARANCE:  Alert, pleasant and cooperative, elderly female sitting in wheelchair on exam today  HEENT: normocephalic, moist mucous membranes, nose without drainage or crusting, hearing acuity: intact  RESP:  respiratory effort normal, no respiratory distress, Lung sounds clear, patient is on RA  CV: auscultation of heart done, rate and rhythm regular. no murmur, no rub or gallop. No peripheral edema,   ABDOMEN:  + bowel sounds, soft, nontender, no grimacing or guarding with palpation.  M/S:   Gait and station abnormal: NWB to RLE; able to wiggle  toes well- has ACE and knee brace in place to right knee  PSYCH: insight and judgement intact, memory baseline, affect and mood normal    Labs:   Labs done in SNF are in [place] EPIC. Please refer to them using EPIC/Care Everywhere. and Recent labs in EPIC reviewed by me today.     ASSESSMENT/PLAN:  (N39.0) Urinary tract infection without hematuria, site unspecified  (primary encounter diagnosis)  Comment: Acute, with frequency, no dysuria, afebrile  -UC + >100,000 copies of Klebsiella pneumoniae.   Plan: Continue cipro 250 mg BID x 7 days. Monitor for improvement. Encourage hydration.     (Z86.19) Hx of Clostridium difficile infection  Comment: Acute on chronic- reports bowels are formed to her knowledge and noted in nursing documentation - now on antibiotics again for UTI  -completed vanco taper 9/3  Plan: Oral vancomycin 125 mg po TID x10 days for prophy dosing since just completed oral vanco taper. Monitor for reoccurence      (S82.101D,  S82.831D) Closed fracture of proximal end of right tibia and fibula with routine healing, subsequent encounter    (Z96.7, Z87.81) S/p ORIF  Comment: Acute, reports pain today a little worse than 3/10-slowly improving- using oxy less frequently than last week-but not at goal  -S/p ORIF to right proximal tibia fracture on 8/28/19.   Plan: Continue scheduled tylenol TID. Continue oxycodone PRN- to continue for shortest duration as possible. ASA for DVT prophy. NWB to RLE- brace to be worn with activity, can be removed at rest. Continue bowel regimen. Follow up with ortho as scheduled.     (I10) Essential hypertension  Comment: Acute on chronic, BP not meeting goal SBP 130s-180s  -Metoprolol XL changed to 50 mg BID per discussion with geriatric pharmacist  Plan:. Based on JNC-8 goals,  patients age of 88 year old, no presence of diabetes or CKD, and goals of care goal BP is <150/90 mm Hg. Continue amlodipine qhs. Change metoprolol XL to 50 mg BID per discussion with geriatric  pharmacist. Continue PTA imdur, clonidine. Notify provider if SBP >150/90 x3 consecutive BPs. VS per TCU policy.       (D64.9) Anemia, unspecified type  Comment: Acute on chronic, hgb lower than baseline-suspected to be due to recent surgery- no blood in urine or stool  Hemoglobin   Date Value Ref Range Status   09/09/2019 8.3 (A) 12.0 - 16.0 g/dL Final   09/07/2019 8.1 (A) 12.0 - 16.0 g/dL Final     -baseline 8-10  Plan: Hgb 9/17. Monitor for s/sx of bleeding.       (Z87.19) History of GI bleed  Comment: With possible GI bleed in 6/2019-no blood in stool or dark stool per nursing  Plan: Continue PTA omeprazole. Will hold fish oil while on ASA at TCU. Monitor     (R53.81) Physical deconditioning  Comment: Acute, secondary to recent hospitalization, medical conditions as above  Plan: Encourage participation in physical therapy/occupational therapy for strengthening and deconditioning. Discharge planning per their recommendation. Social work to assist with d/c planning.    Other chronic issues reviewed:  (F41.1) Generalized anxiety disorder  Comment: chronic  Plan: Continue PTA sertraline. Monitor      (Z87.81) History of compression fracture of spine  Comment: Chronic  Plan: Continue PTA lidocaine patch, gabapentin for pain. Monitor.      (D69.6) Thrombocytopenia (H)  Comment: Chronic, above baseline at last check   -Baseline variable 90s-200s        Platelet Count   Date Value Ref Range Status   09/04/2019 241 140 - 440 thou/uL Final      Plan: CBC PRN. Monitor for s/sx of bleeding.      Electronically signed by:  KARTHIK Walter CNP

## 2019-09-16 NOTE — LETTER
9/16/2019        RE: Daya Ignacio  [address]  [facility] [place] [address]        [facility]  [place] Medical Record Number:  [id number]  Place of Service where encounter took place:  [facility] ([facility]) [[id number]]  Chief Complaint   Patient presents with     RECHECK       HPI:    Daya Ignacio  is a 88 year old (10/11/1930), who is being seen today for an episodic care visit.  HPI information obtained from: facility chart records, facility staff, patient report and [facility] chart review.     Brief Summary of Hospital/ TCU Course: Ms. Daya Ignacio is an 88 year old female with PMH of HTN, lumbar compression fractures, upper GI bleed, generalized anxiety disorder, recurrent ESBL UTI and c.diff infection. Patient returned to ED with fall after discharge from the hospital 12 hours earlier after being admitted for UTI for 6 days. Per hospital notes she was walking in her kitchen and became unwell, fell and instantly had pain in her right leg. S/p ORIF to right proximal tibia fracture on  8/28/19. Is NWB to RLE- should wear knee immobilizer at all times except for with bathing and ice. Developed leukocytosis, but suspect secondary to trauma. With multiple recurrent hospitalizations this past year for ESBL UTI and c.difficile. Completed c.diff taper 9/3. Discharged to TCU.    At TCU Ms. Ignacio was found to UTI from Klebsiella pneumoniae. Treated with 7 days of cipro (completes 9/21). While on antibiotics she received oral vanco prophy.     Today's concern is:  Ms. Ignacio was seen today for routine follow up at TCU. She developed urinary urgency and was found to have UTI. UC + >100,000 copies of Klebsiella pneumoniae. She has remained afebrile. Denies dysuria. Was started on cipro yesterday- has not noticed much improvement in her symptoms yet.     Denies any loose stools, nausea, abdominal pain or cramping. Nursing staff documenting stools as  "brown, soft, formed.    Reports pain to right knee a little worse than a \"3/10\". Overall pain is well managed. Had ortho appointment last week and her sutures were removed. She also has new knee brace. She remains NWB to E. Is using oxycodone about one tap every other day, which is less use from previous week.     Nursing staff today reports that her ulcer to her back is healed.     Denies CP, SOB, dizziness/lightheadedness. No blood in stool or urine per nursing staff documentation- patient still using bedpan.    Reviewed VS at TCU today    Wt Readings from Last 4 Encounters:   09/10/19 58.2 kg (128 lb 3.2 oz)   09/09/19 57.3 kg (126 lb 6.4 oz)   09/03/19 59.6 kg (131 lb 6.4 oz)   08/28/19 56.2 kg (124 lb)       Past Medical and Surgical History reviewed in Epic today.    MEDICATIONS:  Current Outpatient Medications   Medication Sig Dispense Refill     acetaminophen (TYLENOL) 500 MG tablet Take 2 tablets (1,000 mg) by mouth 3 times daily       amLODIPine (NORVASC) 10 MG tablet Take 1 tablet (10 mg) by mouth At Bedtime 90 tablet 1     aspirin (ASA) 325 MG EC tablet Take 1 tablet (325 mg) by mouth daily 30 tablet 0     Calcium Carbonate (CALCIUM 600 PO) Take 1 tablet by mouth 2 times daily        Cholecalciferol (VITAMIN D3) 2000 UNITS CAPS Take 2,000 Units by mouth daily (with dinner)       ciprofloxacin (CIPRO) 250 MG tablet Take 250 mg by mouth 2 times daily       cloNIDine (CATAPRES) 0.1 MG tablet TAKE ONE TABLET BY MOUTH TWO TIMES A  tablet 0     docusate sodium (COLACE) 100 MG capsule Take 1 capsule (100 mg) by mouth At Bedtime       estradiol (ESTRACE) 0.1 MG/GM vaginal cream Place 2 g vaginally three times a week paraben-free 6 g 3     gabapentin (NEURONTIN) 100 MG capsule Take 2 capsules (200 mg) by mouth 2 times daily 120 capsule 0     isosorbide dinitrate (ISORDIL) 10 MG tablet Take 1 tablet (10 mg) by mouth 3 times daily 270 tablet 3     Lidocaine (LIDOCARE) 4 % Patch Apply to low back topically " one time a day for pain and remove per schedule as needed.       metoprolol succinate ER (TOPROL-XL) 100 MG 24 hr tablet Take 0.5 tablets (50 mg) by mouth 2 times daily 90 tablet 1     Multiple Vitamins-Minerals (PRESERVISION AREDS) CAPS Take 1 capsule by mouth 2 times daily        omeprazole (PRILOSEC) 20 MG DR capsule Take 1 capsule (20 mg) by mouth every morning (before breakfast) 30 capsule 0     ondansetron (ZOFRAN-ODT) 4 MG ODT tab Take 1 tablet (4 mg) by mouth every 6 hours as needed for nausea or vomiting       oxyCODONE (ROXICODONE) 5 MG tablet Take 0.5 tablets (2.5 mg) by mouth every 4 hours as needed for severe pain 15 tablet 0     polyethylene glycol (MIRALAX/GLYCOLAX) packet Take 17 g by mouth daily as needed for constipation       senna-docusate (SENOKOT-S/PERICOLACE) 8.6-50 MG tablet Take 2 tablets by mouth 2 times daily as needed for constipation       sertraline (ZOLOFT) 100 MG tablet Take 1 tablet (100 mg) by mouth daily 90 tablet 1     vancomycin (VANCOCIN) 125 MG capsule Take 125 mg by mouth 3 times daily       vitamin C (ASCORBIC ACID) 500 MG tablet Take 500 mg by mouth daily       REVIEW OF SYSTEMS:  5 point ROS of systems including Respiratory, Cardiovascular, Gastroenterology, Genitourinary,  Musculoskeletal were all negative except for pertinent positives noted in my HPI.    Objective:  BP (!) 146/72   Pulse 61   Temp 98.1  F (36.7  C)   Resp 18   Ht 1.524 m (5')   Wt 58.2 kg (128 lb 3.2 oz)   SpO2 98%   BMI 25.04 kg/m     Exam:  GENERAL APPEARANCE:  Alert, pleasant and cooperative, elderly female sitting in wheelchair on exam today  HEENT: normocephalic, moist mucous membranes, nose without drainage or crusting, hearing acuity: intact  RESP:  respiratory effort normal, no respiratory distress, Lung sounds clear, patient is on RA  CV: auscultation of heart done, rate and rhythm regular. no murmur, no rub or gallop. No peripheral edema,   ABDOMEN:  + bowel sounds, soft, nontender, no  grimacing or guarding with palpation.  M/S:   Gait and station abnormal: NWB to RLE; able to wiggle toes well- has ACE and knee brace in place to right knee  PSYCH: insight and judgement intact, memory baseline, affect and mood normal    Labs:   [facility]. Please refer to them using EPIC/Care Everywhere. and Recent labs in EPIC reviewed by me today.     ASSESSMENT/PLAN:  (N39.0) Urinary tract infection without hematuria, site unspecified  (primary encounter diagnosis)  Comment: Acute, with frequency, no dysuria, afebrile  -UC + >100,000 copies of Klebsiella pneumoniae.   Plan: Continue cipro 250 mg BID x 7 days. Monitor for improvement. Encourage hydration.     (Z86.19) Hx of Clostridium difficile infection  Comment: Acute on chronic- reports bowels are formed to her knowledge and noted in nursing documentation  - now on antibiotics again for UTI  -completed vanco taper 9/3  Plan: Oral vancomycin 125 mg po TID x10 days for prophy dosing since just completed oral vanco taper. Monitor for reoccurence      (S82.101D,  S82.831D) Closed fracture of proximal end of right tibia and fibula with routine healing, subsequent encounter    (Z96.7, Z87.81) S/p ORIF  Comment: Acute, reports pain today a little worse than 3/10-slowly improving- using oxy less frequently than last week-but not at goal  -S/p ORIF to right proximal tibia fracture on 8/28/19.   Plan: Continue scheduled tylenol TID. Continue oxycodone PRN- to continue for shortest duration as possible. ASA for DVT prophy. NWB to RLE- brace to be worn with activity, can be removed at rest.  Continue bowel regimen. Follow up with ortho  as scheduled.     (I10) Essential hypertension  Comment: Acute on chronic, BP not meeting goal SBP 130s-180s  -Metoprolol XL changed to 50 mg BID per discussion with geriatric pharmacist  Plan:. Based on JNC-8 goals,  patients age of 88 year old, no presence of diabetes or CKD, and goals of care goal BP is  <150/90 mm Hg. Continue amlodipine  qhs. Change metoprolol XL to 50 mg BID per discussion with geriatric pharmacist. Continue PTA imdur, clonidine. Notify provider if SBP >150/90 x3 consecutive BPs. VS per TCU policy.       (D64.9) Anemia, unspecified type  Comment: Acute on chronic, hgb lower than baseline-suspected to be due to recent surgery- no blood in urine or stool  Hemoglobin   Date Value Ref Range Status   09/09/2019 8.3 (A) 12.0 - 16.0 g/dL Final   09/07/2019 8.1 (A) 12.0 - 16.0 g/dL Final     -baseline 8-10  Plan: Hgb 9/17. Monitor for s/sx of bleeding.       (Z87.19) History of GI bleed  Comment: With possible GI bleed in 6/2019-no blood in stool or dark stool per nursing  Plan: Continue PTA omeprazole. Will hold fish oil while on ASA at TCU. Monitor     (R53.81) Physical deconditioning  Comment: Acute, secondary to recent hospitalization, medical conditions as above  Plan: Encourage participation in physical therapy/occupational therapy for strengthening and deconditioning. Discharge planning per their recommendation. Social work to assist with d/c planning.    Other chronic issues reviewed:  (F41.1) Generalized anxiety disorder  Comment: chronic  Plan: Continue PTA sertraline. Monitor      (Z87.81) History of compression fracture of spine  Comment: Chronic  Plan: Continue PTA lidocaine patch, gabapentin for pain. Monitor.      (D69.6) Thrombocytopenia (H)  Comment: Chronic, above baseline at last check   -Baseline variable 90s-200s        Platelet Count   Date Value Ref Range Status   09/04/2019 241 140 - 440 thou/uL Final      Plan: CBC PRN. Monitor for s/sx of bleeding.      Electronically signed by:  KARTHIK Walter CNP          Sincerely,        KARTHIK Walter CNP

## 2019-09-17 ENCOUNTER — TRANSFERRED RECORDS (OUTPATIENT)
Dept: HEALTH INFORMATION MANAGEMENT | Facility: CLINIC | Age: 84
End: 2019-09-17

## 2019-09-17 ENCOUNTER — TELEPHONE (OUTPATIENT)
Dept: FAMILY MEDICINE | Facility: CLINIC | Age: 84
End: 2019-09-17

## 2019-09-17 LAB
HEMOGLOBIN: 9.4 G/DL (ref 12–16)
HGB BLD-MCNC: 9.4 G/DL (ref 12–16)

## 2019-09-17 NOTE — TELEPHONE ENCOUNTER
Received 6 page fax for Home Health Certification and Plan of Care for Dr Omer to complete. Form in the in-basket at [facility] in AA's folder.

## 2019-09-18 VITALS
TEMPERATURE: 98.6 F | RESPIRATION RATE: 18 BRPM | DIASTOLIC BLOOD PRESSURE: 80 MMHG | HEIGHT: 60 IN | SYSTOLIC BLOOD PRESSURE: 174 MMHG | HEART RATE: 72 BPM | WEIGHT: 128.2 LBS | OXYGEN SATURATION: 99 % | BODY MASS INDEX: 25.17 KG/M2

## 2019-09-18 ASSESSMENT — MIFFLIN-ST. JEOR: SCORE: 933.01

## 2019-09-19 ENCOUNTER — NURSING HOME VISIT (OUTPATIENT)
Dept: GERIATRICS | Facility: CLINIC | Age: 84
End: 2019-09-19
Payer: MEDICARE

## 2019-09-19 VITALS
TEMPERATURE: 98 F | WEIGHT: 128.2 LBS | OXYGEN SATURATION: 98 % | HEART RATE: 60 BPM | DIASTOLIC BLOOD PRESSURE: 61 MMHG | BODY MASS INDEX: 25.17 KG/M2 | SYSTOLIC BLOOD PRESSURE: 118 MMHG | HEIGHT: 60 IN | RESPIRATION RATE: 18 BRPM

## 2019-09-19 DIAGNOSIS — Z53.9 ERRONEOUS ENCOUNTER--DISREGARD: Primary | ICD-10-CM

## 2019-09-19 DIAGNOSIS — Z53.9 DIAGNOSIS NOT YET DEFINED: Primary | ICD-10-CM

## 2019-09-19 PROCEDURE — G0180 MD CERTIFICATION HHA PATIENT: HCPCS | Performed by: FAMILY MEDICINE

## 2019-09-19 ASSESSMENT — MIFFLIN-ST. JEOR: SCORE: 933.01

## 2019-09-19 NOTE — PROGRESS NOTES
[place] [facility]  [place] Medical Record Number:  [id number]  Place of Service where encounter took place:  [facility] ([facility]) [[id number]]  Chief Complaint   Patient presents with     Nursing Home Acute       HPI:    Daya Ignacio  is a 88 year old (10/11/1930), who is being seen today for an episodic care visit.  HPI information obtained from: facility chart records, facility staff, patient report and [facility] chart review.    Brief Summary of Hospital/ TCU Course: Ms. Daya Ignacio is an 88 year old female with PMH of HTN, lumbar compression fractures, upper GI bleed, generalized anxiety disorder, recurrent ESBL UTI and c.diff infection. Patient returned to ED with fall after discharge from the hospital 12 hours earlier after being admitted for UTI for 6 days. Per hospital notes she was walking in her kitchen and became unwell, fell and instantly had pain in her right leg. S/p ORIF to right proximal tibia fracture on  8/28/19. Is NWB to RLE- should wear knee immobilizer at all times except for with bathing and ice. Developed leukocytosis, but suspect secondary to trauma. With multiple recurrent hospitalizations this past year for ESBL UTI and c.difficile. Completed c.diff taper 9/3. Discharged to TCU.               At TCU Ms. Ignacio was found to UTI from Klebsiella pneumoniae. Treated with 7 days of cipro (completes 9/21). While on antibiotics she received oral vanco prophy.      Today's concern is:  Ms. Ignacio was seen today for episodic follow up at TCU. She continues to report frequency and urgency, although feels it is improving. Last day of antibiotics is tomorrow- will have completed 7 day course.    Remains afebrile.    Reports some nausea this morning, which is new symptoms. Has not had emesis. Last BM yesterday.  Denies loose stools. Is on prophy vanco. Denies abdominal pain/cramping.     Past Medical and Surgical History reviewed in Epic  today.    MEDICATIONS:  Current Outpatient Medications   Medication Sig Dispense Refill     acetaminophen (TYLENOL) 500 MG tablet Take 2 tablets (1,000 mg) by mouth 3 times daily       amLODIPine (NORVASC) 10 MG tablet Take 1 tablet (10 mg) by mouth At Bedtime 90 tablet 1     aspirin (ASA) 325 MG EC tablet Take 1 tablet (325 mg) by mouth daily 30 tablet 0     Calcium Carbonate (CALCIUM 600 PO) Take 1 tablet by mouth 2 times daily        Cholecalciferol (VITAMIN D3) 2000 UNITS CAPS Take 2,000 Units by mouth daily (with dinner)       ciprofloxacin (CIPRO) 250 MG tablet Take 250 mg by mouth 2 times daily       cloNIDine (CATAPRES) 0.1 MG tablet TAKE ONE TABLET BY MOUTH TWO TIMES A  tablet 0     docusate sodium (COLACE) 100 MG capsule Take 1 capsule (100 mg) by mouth At Bedtime       estradiol (ESTRACE) 0.1 MG/GM vaginal cream Place 2 g vaginally three times a week paraben-free 6 g 3     gabapentin (NEURONTIN) 100 MG capsule Take 2 capsules (200 mg) by mouth 2 times daily 120 capsule 0     isosorbide dinitrate (ISORDIL) 10 MG tablet Take 1 tablet (10 mg) by mouth 3 times daily 270 tablet 3     Lidocaine (LIDOCARE) 4 % Patch Apply to low back topically one time a day for pain and remove per schedule as needed.       metoprolol succinate ER (TOPROL-XL) 100 MG 24 hr tablet Take 0.5 tablets (50 mg) by mouth 2 times daily 90 tablet 1     Multiple Vitamins-Minerals (PRESERVISION AREDS) CAPS Take 1 capsule by mouth 2 times daily        omeprazole (PRILOSEC) 20 MG DR capsule Take 1 capsule (20 mg) by mouth every morning (before breakfast) 30 capsule 0     ondansetron (ZOFRAN-ODT) 4 MG ODT tab Take 1 tablet (4 mg) by mouth every 6 hours as needed for nausea or vomiting       oxyCODONE (ROXICODONE) 5 MG tablet Take 0.5 tablets (2.5 mg) by mouth every 4 hours as needed for severe pain 15 tablet 0     phenazopyridine (PYRIDIUM) 100 MG tablet Take 1 tablet (100 mg) by mouth 2 times daily       polyethylene glycol  (MIRALAX/GLYCOLAX) packet Take 17 g by mouth daily as needed for constipation       senna-docusate (SENOKOT-S/PERICOLACE) 8.6-50 MG tablet Take 2 tablets by mouth 2 times daily as needed for constipation       sertraline (ZOLOFT) 100 MG tablet Take 1 tablet (100 mg) by mouth daily 90 tablet 1     vancomycin (VANCOCIN) 125 MG capsule Take 125 mg by mouth 3 times daily       vitamin C (ASCORBIC ACID) 500 MG tablet Take 500 mg by mouth daily         REVIEW OF SYSTEMS:  4 point ROS including Respiratory, CV, GI and , other than that noted in the HPI,  is negative    Objective:  /61   Pulse 60   Temp 98  F (36.7  C)   Resp 18   Ht 1.524 m (5')   Wt 58.2 kg (128 lb 3.2 oz)   SpO2 98%   BMI 25.04 kg/m    Exam:  GENERAL APPEARANCE:  Alert, pleasant and cooperative, elderly female lying in bed on exam today  HEENT: normocephalic, moist mucous membranes, nose without drainage or crusting, hearing acuity: intact  RESP:  respiratory effort normal, no respiratory distress, Lung sounds clear, patient is on RA  CV: auscultation of heart done, rate and rhythm regular. no murmur, no rub or gallop.   ABDOMEN:  + bowel sounds, soft, nontender, no grimacing or guarding with palpation.  PSYCH: insight and judgement intact, memory baseline, affect and mood normal    Labs:   Labs done in SNF are in [facility]. Please refer to them using [facility]/Care Everywhere. and Recent labs in [facility] reviewed by me today.     ASSESSMENT/PLAN:  (N39.0) Urinary tract infection without hematuria, site unspecified  (primary encounter diagnosis)  Comment: Acute, with frequency and urgency that persists-no dysuria, afebrile- not at goal  -UC + >100,000 copies of Klebsiella pneumoniae.   Plan: Continue cipro 250 mg BID x 7 days (through tomorrow). Start pyridium 100 mg BID through Monday to see if helps with symptoms. Do not want to extend antibiotic dose as patient is afebrile and bacteria is susceptible to cipro, and with recent c.diff. Monitor  for improvement. Encourage hydration.      (Z86.19) Hx of Clostridium difficile infection  Comment: Acute on chronic- reports bowels are formed to her knowledge and noted in nursing documentation - now on antibiotics again for UTI  -completed vanco taper 9/3  Plan: Oral vancomycin 125 mg po TID x10 days (through 9/25) for prophy dosing since just completed oral vanco taper. Monitor for reoccurrence.    (R11.0) Nausea  Comment: acute, first time experiencing nausea was this morning- no emesis- had not had breakfast yet  Plan: Zofran PRN. Monitor for now.       Electronically signed by:  KARTHIK Walter CNP

## 2019-09-20 ENCOUNTER — NURSING HOME VISIT (OUTPATIENT)
Dept: GERIATRICS | Facility: CLINIC | Age: 84
End: 2019-09-20
Payer: MEDICARE

## 2019-09-20 DIAGNOSIS — R11.0 NAUSEA: ICD-10-CM

## 2019-09-20 DIAGNOSIS — Z86.19 HX OF CLOSTRIDIUM DIFFICILE INFECTION: ICD-10-CM

## 2019-09-20 DIAGNOSIS — N39.0 URINARY TRACT INFECTION WITHOUT HEMATURIA, SITE UNSPECIFIED: Primary | ICD-10-CM

## 2019-09-20 PROCEDURE — 99309 SBSQ NF CARE MODERATE MDM 30: CPT | Performed by: NURSE PRACTITIONER

## 2019-09-20 RX ORDER — PHENAZOPYRIDINE HYDROCHLORIDE 100 MG/1
100 TABLET, FILM COATED ORAL 2 TIMES DAILY
Start: 2019-09-20 | End: 2019-09-26

## 2019-09-20 NOTE — LETTER
9/20/2019        RE: Daya Ignacio  [address]  [facility] [place] [address]        [place] [facility]  [place] Medical Record Number:  [id number]  Place of Service where encounter took place:  [facility] ([facility]) [[id number]]  Chief Complaint   Patient presents with     Nursing Home Acute       HPI:    Daya Ignacio  is a 88 year old (10/11/1930), who is being seen today for an episodic care visit.  HPI information obtained from: facility chart records, facility staff, patient report and [facility] chart review.    Brief Summary of Hospital/ TCU Course: Ms. Daya Ignacio is an 88 year old female with PMH of HTN, lumbar compression fractures, upper GI bleed, generalized anxiety disorder, recurrent ESBL UTI and c.diff infection. Patient returned to ED with fall after discharge from the hospital 12 hours earlier after being admitted for UTI for 6 days. Per hospital notes she was walking in her kitchen and became unwell, fell and instantly had pain in her right leg. S/p ORIF to right proximal tibia fracture on  8/28/19. Is NWB to RLE- should wear knee immobilizer at all times except for with bathing and ice. Developed leukocytosis, but suspect secondary to trauma. With multiple recurrent hospitalizations this past year for ESBL UTI and c.difficile. Completed c.diff taper 9/3. Discharged to TCU.               At TCU Ms. Ignacio was found to UTI from Klebsiella pneumoniae. Treated with 7 days of cipro (completes 9/21). While on antibiotics she received oral vanco prophy.      Today's concern is:  Ms. Ignacio was seen today for episodic follow up at TCU. She continues to report frequency and urgency, although feels it is improving. Last day of antibiotics is tomorrow- will have completed 7 day course.    Remains afebrile.    Reports some nausea this morning, which is new symptoms. Has not had emesis. Last BM yesterday.  Denies loose stools. Is on prophy vanco.  Denies abdominal pain/cramping.     Past Medical and Surgical History reviewed in Epic today.    MEDICATIONS:  Current Outpatient Medications   Medication Sig Dispense Refill     acetaminophen (TYLENOL) 500 MG tablet Take 2 tablets (1,000 mg) by mouth 3 times daily       amLODIPine (NORVASC) 10 MG tablet Take 1 tablet (10 mg) by mouth At Bedtime 90 tablet 1     aspirin (ASA) 325 MG EC tablet Take 1 tablet (325 mg) by mouth daily 30 tablet 0     Calcium Carbonate (CALCIUM 600 PO) Take 1 tablet by mouth 2 times daily        Cholecalciferol (VITAMIN D3) 2000 UNITS CAPS Take 2,000 Units by mouth daily (with dinner)       ciprofloxacin (CIPRO) 250 MG tablet Take 250 mg by mouth 2 times daily       cloNIDine (CATAPRES) 0.1 MG tablet TAKE ONE TABLET BY MOUTH TWO TIMES A  tablet 0     docusate sodium (COLACE) 100 MG capsule Take 1 capsule (100 mg) by mouth At Bedtime       estradiol (ESTRACE) 0.1 MG/GM vaginal cream Place 2 g vaginally three times a week paraben-free 6 g 3     gabapentin (NEURONTIN) 100 MG capsule Take 2 capsules (200 mg) by mouth 2 times daily 120 capsule 0     isosorbide dinitrate (ISORDIL) 10 MG tablet Take 1 tablet (10 mg) by mouth 3 times daily 270 tablet 3     Lidocaine (LIDOCARE) 4 % Patch Apply to low back topically one time a day for pain and remove per schedule as needed.       metoprolol succinate ER (TOPROL-XL) 100 MG 24 hr tablet Take 0.5 tablets (50 mg) by mouth 2 times daily 90 tablet 1     Multiple Vitamins-Minerals (PRESERVISION AREDS) CAPS Take 1 capsule by mouth 2 times daily        omeprazole (PRILOSEC) 20 MG DR capsule Take 1 capsule (20 mg) by mouth every morning (before breakfast) 30 capsule 0     ondansetron (ZOFRAN-ODT) 4 MG ODT tab Take 1 tablet (4 mg) by mouth every 6 hours as needed for nausea or vomiting       oxyCODONE (ROXICODONE) 5 MG tablet Take 0.5 tablets (2.5 mg) by mouth every 4 hours as needed for severe pain 15 tablet 0     phenazopyridine (PYRIDIUM) 100 MG  tablet Take 1 tablet (100 mg) by mouth 2 times daily       polyethylene glycol (MIRALAX/GLYCOLAX) packet Take 17 g by mouth daily as needed for constipation       senna-docusate (SENOKOT-S/PERICOLACE) 8.6-50 MG tablet Take 2 tablets by mouth 2 times daily as needed for constipation       sertraline (ZOLOFT) 100 MG tablet Take 1 tablet (100 mg) by mouth daily 90 tablet 1     vancomycin (VANCOCIN) 125 MG capsule Take 125 mg by mouth 3 times daily       vitamin C (ASCORBIC ACID) 500 MG tablet Take 500 mg by mouth daily         REVIEW OF SYSTEMS:  4 point ROS including Respiratory, CV, GI and , other than that noted in the HPI,  is negative    Objective:  /61   Pulse 60   Temp 98  F (36.7  C)   Resp 18   Ht 1.524 m (5')   Wt 58.2 kg (128 lb 3.2 oz)   SpO2 98%   BMI 25.04 kg/m     Exam:  GENERAL APPEARANCE:  Alert, pleasant and cooperative, elderly female lying in bed on exam today  HEENT: normocephalic, moist mucous membranes, nose without drainage or crusting, hearing acuity: intact  RESP:  respiratory effort normal, no respiratory distress, Lung sounds clear, patient is on RA  CV: auscultation of heart done, rate and rhythm regular. no murmur, no rub or gallop.   ABDOMEN:  + bowel sounds, soft, nontender, no grimacing or guarding with palpation.  PSYCH: insight and judgement intact, memory baseline, affect and mood normal    Labs:   Labs done in SNF are in [facility]. Please refer to them using [facility]/Care Everywhere. and Recent labs in [facility] reviewed by me today.     ASSESSMENT/PLAN:  (N39.0) Urinary tract infection without hematuria, site unspecified  (primary encounter diagnosis)  Comment: Acute, with frequency and urgency that persists-no dysuria, afebrile- not at goal  -UC + >100,000 copies of Klebsiella pneumoniae.   Plan: Continue cipro 250 mg BID x 7 days (through tomorrow). Start pyridium 100 mg BID through Monday to see if helps with symptoms. Do not want to extend antibiotic dose as patient  is afebrile and bacteria is susceptible to cipro, and with recent c.diff. Monitor for improvement. Encourage hydration.      (Z86.19) Hx of Clostridium difficile infection  Comment: Acute on chronic- reports bowels are formed to her knowledge and noted in nursing documentation - now on antibiotics again for UTI  -completed vanco taper 9/3  Plan: Oral vancomycin 125 mg po TID x10 days (through 9/25) for prophy dosing since just completed oral vanco taper. Monitor for reoccurrence.    (R11.0) Nausea  Comment: acute, first time experiencing nausea was this morning- no emesis- had not had breakfast yet  Plan: Zofran PRN. Monitor for now.       Electronically signed by:  KARTHIK Walter CNP          Sincerely,        KARTHIK Walter CNP

## 2019-09-22 ENCOUNTER — RECORDS - HEALTHEAST (OUTPATIENT)
Dept: LAB | Facility: CLINIC | Age: 84
End: 2019-09-22

## 2019-09-23 ENCOUNTER — TRANSFERRED RECORDS (OUTPATIENT)
Dept: HEALTH INFORMATION MANAGEMENT | Facility: CLINIC | Age: 84
End: 2019-09-23

## 2019-09-23 ENCOUNTER — NURSING HOME VISIT (OUTPATIENT)
Dept: GERIATRICS | Facility: CLINIC | Age: 84
End: 2019-09-23
Payer: MEDICARE

## 2019-09-23 VITALS
BODY MASS INDEX: 25.21 KG/M2 | SYSTOLIC BLOOD PRESSURE: 159 MMHG | RESPIRATION RATE: 18 BRPM | WEIGHT: 128.4 LBS | HEART RATE: 60 BPM | OXYGEN SATURATION: 93 % | TEMPERATURE: 97 F | HEIGHT: 60 IN | DIASTOLIC BLOOD PRESSURE: 78 MMHG

## 2019-09-23 DIAGNOSIS — D64.9 ANEMIA, UNSPECIFIED TYPE: ICD-10-CM

## 2019-09-23 DIAGNOSIS — Z86.19 HX OF CLOSTRIDIUM DIFFICILE INFECTION: ICD-10-CM

## 2019-09-23 DIAGNOSIS — I10 ESSENTIAL HYPERTENSION: ICD-10-CM

## 2019-09-23 DIAGNOSIS — R35.0 URINARY FREQUENCY: Primary | ICD-10-CM

## 2019-09-23 DIAGNOSIS — Z87.81 S/P ORIF (OPEN REDUCTION INTERNAL FIXATION) FRACTURE: ICD-10-CM

## 2019-09-23 DIAGNOSIS — Z98.890 S/P ORIF (OPEN REDUCTION INTERNAL FIXATION) FRACTURE: ICD-10-CM

## 2019-09-23 DIAGNOSIS — R53.81 PHYSICAL DECONDITIONING: ICD-10-CM

## 2019-09-23 DIAGNOSIS — S82.101D CLOSED FRACTURE OF PROXIMAL END OF RIGHT TIBIA AND FIBULA WITH ROUTINE HEALING, SUBSEQUENT ENCOUNTER: ICD-10-CM

## 2019-09-23 DIAGNOSIS — S82.831D CLOSED FRACTURE OF PROXIMAL END OF RIGHT TIBIA AND FIBULA WITH ROUTINE HEALING, SUBSEQUENT ENCOUNTER: ICD-10-CM

## 2019-09-23 DIAGNOSIS — Z87.19 HISTORY OF GI BLEED: ICD-10-CM

## 2019-09-23 LAB
ANION GAP SERPL CALCULATED.3IONS-SCNC: 7 MMOL/L (ref 5–18)
ANION GAP SERPL CALCULATED.3IONS-SCNC: 7 MMOL/L (ref 5–18)
BUN SERPL-MCNC: 16 MG/DL (ref 8–28)
BUN SERPL-MCNC: 16 MG/DL (ref 8–28)
CALCIUM SERPL-MCNC: 9.9 MG/DL (ref 8.5–10.5)
CALCIUM SERPL-MCNC: 9.9 MG/DL (ref 8.5–10.5)
CHLORIDE BLD-SCNC: 102 MMOL/L (ref 98–107)
CHLORIDE SERPLBLD-SCNC: 102 MMOL/L (ref 98–107)
CO2 SERPL-SCNC: 28 MMOL/L (ref 22–31)
CO2 SERPL-SCNC: 28 MMOL/L (ref 22–31)
CREAT SERPL-MCNC: 0.63 MG/DL (ref 0.6–1.1)
CREAT SERPL-MCNC: 0.63 MG/DL (ref 0.6–1.1)
GFR SERPL CREATININE-BSD FRML MDRD: >60 ML/MIN/1.73M2
GFR SERPL CREATININE-BSD FRML MDRD: >60 ML/MIN/1.73M2
GLUCOSE BLD-MCNC: 88 MG/DL (ref 70–125)
GLUCOSE SERPL-MCNC: 88 MG/DL (ref 70–125)
POTASSIUM BLD-SCNC: 3.4 MMOL/L (ref 3.5–5)
POTASSIUM SERPL-SCNC: 3.4 MMOL/L (ref 3.5–5)
SODIUM SERPL-SCNC: 137 MMOL/L (ref 136–145)
SODIUM SERPL-SCNC: 137 MMOL/L (ref 136–145)

## 2019-09-23 PROCEDURE — 99309 SBSQ NF CARE MODERATE MDM 30: CPT | Performed by: NURSE PRACTITIONER

## 2019-09-23 ASSESSMENT — MIFFLIN-ST. JEOR: SCORE: 933.92

## 2019-09-23 NOTE — PROGRESS NOTES
[place] [facility]  [place] Medical Record Number:  [id number]  Place of Service where encounter took place:  [facility] ([facility]) [[id number]]  Chief Complaint   Patient presents with     RECHECK       HPI:    Daya Ignacio  is a 88 year old (10/11/1930), who is being seen today for an episodic care visit.  HPI information obtained from: facility chart records, facility staff, patient report and [facility] chart review.     Brief Summary of Hospital/ TCU Course: Ms. Daya Ignacio is an 88 year old female with PMH of HTN, lumbar compression fractures, upper GI bleed, generalized anxiety disorder, recurrent ESBL UTI and c.diff infection. Patient returned to ED with fall after discharge from the hospital 12 hours earlier after being admitted for UTI for 6 days. Per hospital notes she was walking in her kitchen and became unwell, fell and instantly had pain in her right leg. S/p ORIF to right proximal tibia fracture on  8/28/19. Is NWB to RLE- should wear knee immobilizer at all times except for with bathing and ice. Developed leukocytosis, but suspect secondary to trauma. With multiple recurrent hospitalizations this past year for ESBL UTI and c.difficile. Completed c.diff taper 9/3. Discharged to TCU.               At TCU Ms. Ignacio was found to UTI from Klebsiella pneumoniae. Treated with 7 days of cipro (completes 9/21). While on antibiotics she received oral vanco prophy.        Today's concern is:  Ms. Ignacio was seen today for routine follow up at TCU. She continues to report frequency. Nursing aids reports she is asking to use bed pan frequently and often has very little to medium amount of urine present. Patient believes she is able to empty her bladder but is not certain. She remains afebrile. Has not noticed a change in her symptoms with pyridium. Denies pain and dysuria.    Reports some pain to right knee and rates 3-4/10. Used oxycodone twice this past week, is  using less and less. Continues to be NWB to RLE    Last BM yesterday. Prior to that last BM charted was 3 day prior. Otherwise with daily BM charted. Stool documented as formed and soft. Denies abdominal pain and cramping.    Denies CP, SOB, dizziness/lightheadedness.      Wt Readings from Last 4 Encounters:   09/23/19 58.2 kg (128 lb 6.4 oz)   09/19/19 58.2 kg (128 lb 3.2 oz)   09/18/19 58.2 kg (128 lb 3.2 oz)   09/10/19 58.2 kg (128 lb 3.2 oz)       Past Medical and Surgical History reviewed in Epic today.    MEDICATIONS:  Current Outpatient Medications   Medication Sig Dispense Refill     acetaminophen (TYLENOL) 500 MG tablet Take 2 tablets (1,000 mg) by mouth 3 times daily       amLODIPine (NORVASC) 10 MG tablet Take 1 tablet (10 mg) by mouth At Bedtime 90 tablet 1     aspirin (ASA) 325 MG EC tablet Take 1 tablet (325 mg) by mouth daily 30 tablet 0     Calcium Carbonate (CALCIUM 600 PO) Take 1 tablet by mouth 2 times daily        Cholecalciferol (VITAMIN D3) 2000 UNITS CAPS Take 2,000 Units by mouth daily (with dinner)       cloNIDine (CATAPRES) 0.1 MG tablet TAKE ONE TABLET BY MOUTH TWO TIMES A  tablet 0     docusate sodium (COLACE) 100 MG capsule Take 1 capsule (100 mg) by mouth At Bedtime       estradiol (ESTRACE) 0.1 MG/GM vaginal cream Place 2 g vaginally three times a week paraben-free 6 g 3     gabapentin (NEURONTIN) 100 MG capsule Take 2 capsules (200 mg) by mouth 2 times daily 120 capsule 0     isosorbide dinitrate (ISORDIL) 10 MG tablet Take 1 tablet (10 mg) by mouth 3 times daily 270 tablet 3     Lidocaine (LIDOCARE) 4 % Patch Apply to low back topically one time a day for pain and remove per schedule as needed.       metoprolol succinate ER (TOPROL-XL) 100 MG 24 hr tablet Take 0.5 tablets (50 mg) by mouth 2 times daily 90 tablet 1     Multiple Vitamins-Minerals (PRESERVISION AREDS) CAPS Take 1 capsule by mouth 2 times daily        omeprazole (PRILOSEC) 20 MG DR capsule Take 1 capsule (20 mg)  by mouth every morning (before breakfast) 30 capsule 0     ondansetron (ZOFRAN-ODT) 4 MG ODT tab Take 1 tablet (4 mg) by mouth every 6 hours as needed for nausea or vomiting       oxyCODONE (ROXICODONE) 5 MG tablet Take 0.5 tablets (2.5 mg) by mouth every 4 hours as needed for severe pain 15 tablet 0     phenazopyridine (PYRIDIUM) 100 MG tablet Take 1 tablet (100 mg) by mouth 2 times daily       polyethylene glycol (MIRALAX/GLYCOLAX) packet Take 17 g by mouth daily as needed for constipation       senna-docusate (SENOKOT-S/PERICOLACE) 8.6-50 MG tablet Take 2 tablets by mouth 2 times daily as needed for constipation       sertraline (ZOLOFT) 100 MG tablet Take 1 tablet (100 mg) by mouth daily 90 tablet 1     vancomycin (VANCOCIN) 125 MG capsule Take 125 mg by mouth 3 times daily       vitamin C (ASCORBIC ACID) 500 MG tablet Take 500 mg by mouth daily         REVIEW OF SYSTEMS:  6 point ROS of systems including Respiratory, Cardiovascular, Gastroenterology, Genitourinary, Musculoskeletal, Psychiatric were all negative except for pertinent positives noted in my HPI.    Objective:  BP (!) 159/78   Pulse 60   Temp 97  F (36.1  C)   Resp 18   Ht 1.524 m (5')   Wt 58.2 kg (128 lb 6.4 oz)   SpO2 93%   BMI 25.08 kg/m    Exam:  GENERAL APPEARANCE:  Alert, pleasant and cooperative, elderly female sitting in wheelchair on exam today  HEENT: normocephalic, moist mucous membranes, nose without drainage or crusting, hearing acuity: intact  RESP:  respiratory effort normal, no respiratory distress, Lung sounds clear, patient is on RA  CV: auscultation of heart done, rate and rhythm regular. no murmur, no rub or gallop. No peripheral edema,   ABDOMEN:  + bowel sounds, soft, nontender, no grimacing or guarding with palpation.  M/S:   Gait and station abnormal: NWB to RLE; able to wiggle toes well- has ACE and knee brace in place to right knee  PSYCH: affect and mood normal    Labs:   Labs done in SNF are in [facility].  Please refer to them using [employer] [facility]/Care Everywhere. and Recent labs in [employer] [facility] reviewed by me today.     ASSESSMENT/PLAN:  (R35.0) Urinary frequency  Comment: Acute- maybe chronic?, with frequency, no dysuria, afebrile- just completed cipro 9/21 for UTI with UC + >100,000 copies of Klebsiella pneumoniae.   Plan: Bladder scan x3 after urination unless patient requires straight cath, then continue indefinitely for now. Straight catheterize if PVR>300cc. If patient requires straight cath x3 please place vallecillo. Encourage hydration. Continue vaginal estradiol 3 times per week.     (Z86.19) Hx of Clostridium difficile infection  Comment: Acute on chronic- reports bowels are formed to her knowledge and noted in nursing documentation -completed antibiotics recently  -completed vanco taper 9/3  Plan: Oral vancomycin 125 mg po TID x10 days (through 9/25) for prophy dosing since just completed oral vanco taper. Monitor for reoccurence      (S82.101D,  S82.831D) Closed fracture of proximal end of right tibia and fibula with routine healing, subsequent encounter    (Z96.7, Z87.81) S/p ORIF  Comment: Acute, reports pain 3-4/10-slowly improving- using oxy less frequently than last week-but not at goal  -S/p ORIF to right proximal tibia fracture on 8/28/19.   Plan: Continue scheduled tylenol TID. Continue oxycodone PRN- to continue for shortest duration as possible. ASA for DVT prophy. NWB to RLE- brace to be worn with activity, can be removed at rest. Continue bowel regimen. Follow up with ortho as scheduled.     (I10) Essential hypertension  Comment: Acute on chronic, BP variable -165, outliers 174, 183, 190  -Metoprolol XL changed to 50 mg BID per discussion with geriatric pharmacist  Plan: Will not make any changes to medications today due to varying BP- to avoid hypotension, falls, frailty. Based on JNC-8 goals,  patients age of 88 year old, no presence of diabetes or CKD, and goals of care goal BP is <150/90 mm Hg. Continue  amlodipine qhs. Continue metoprolol XL 50 mg BID. Continue PTA imdur, clonidine. Notify provider if SBP >150/90 x3 consecutive BPs. VS per TCU policy.       (D64.9) Anemia, unspecified type  Comment: Acute on chronic, hgb at baseline-suspected to be due to recent surgery- no blood in urine or stool  Hemoglobin   Date Value Ref Range Status   09/17/2019 9.4 (A) 12.0 - 16.0 g/dL Final   09/09/2019 8.3 (A) 12.0 - 16.0 g/dL Final     -baseline 8-10  Plan: Hgb PRN. Monitor for s/sx of bleeding.     (Z87.19) History of GI bleed  Comment: With possible GI bleed in 6/2019-no blood in stool or dark stool per nursing  Plan: Continue PTA omeprazole. Will hold fish oil while on ASA at TCU. Monitor     (R53.81) Physical deconditioning  Comment: Acute, secondary to recent hospitalization, medical conditions as above- progress with therapies slow- is NWB  Plan: Encourage participation in physical therapy/occupational therapy for strengthening and deconditioning. Discharge planning per their recommendation. Social work to assist with d/c planning.     Other chronic issues reviewed:  (F41.1) Generalized anxiety disorder  Comment: chronic  Plan: Continue PTA sertraline. Monitor      (Z87.81) History of compression fracture of spine  Comment: Chronic  Plan: Continue PTA lidocaine patch, gabapentin for pain. Monitor.      (D69.6) Thrombocytopenia (H)  Comment: Chronic, above baseline at last check   -Baseline variable 90s-200s            Platelet Count   Date Value Ref Range Status   09/04/2019 241 140 - 440 thou/uL Final      Plan: CBC PRN. Monitor for s/sx of bleeding.        Electronically signed by:  KARTHIK Walter CNP

## 2019-09-23 NOTE — LETTER
9/23/2019        RE: Daya Ignacio  [address]  [facility] [address]        [place] [facility]  [place] Medical Record Number:  [id number]  Place of Service where encounter took place:  [facility] ([facility]) [[id number]]  Chief Complaint   Patient presents with     RECHECK       HPI:    Daya Ignacio  is a 88 year old (10/11/1930), who is being seen today for an episodic care visit.  HPI information obtained from: facility chart records, facility staff, patient report and [facility] chart review.     Brief Summary of Hospital/ TCU Course: Ms. Daya Ignacio is an 88 year old female with PMH of HTN, lumbar compression fractures, upper GI bleed, generalized anxiety disorder, recurrent ESBL UTI and c.diff infection. Patient returned to ED with fall after discharge from the hospital 12 hours earlier after being admitted for UTI for 6 days. Per hospital notes she was walking in her kitchen and became unwell, fell and instantly had pain in her right leg. S/p ORIF to right proximal tibia fracture on  8/28/19. Is NWB to RLE- should wear knee immobilizer at all times except for with bathing and ice. Developed leukocytosis, but suspect secondary to trauma. With multiple recurrent hospitalizations this past year for ESBL UTI and c.difficile. Completed c.diff taper 9/3. Discharged to TCU.               At TCU Ms. Ignacio was found to UTI from Klebsiella pneumoniae. Treated with 7 days of cipro (completes 9/21). While on antibiotics she received oral vanco prophy.        Today's concern is:  Ms. Ignacio was seen today for routine follow up at TCU. She continues to report frequency. Nursing aids reports she is asking to use bed pan frequently and often has very little to medium amount of urine present. Patient believes she is able to empty her bladder but is not certain. She remains afebrile. Has not noticed a change in her symptoms with pyridium. Denies pain and  dysuria.    Reports some pain to right knee and rates 3-4/10. Used oxycodone twice this past week, is using less and less. Continues to be NWB to RLE    Last BM yesterday. Prior to that last BM charted was 3 day prior. Otherwise with daily BM charted. Stool documented as formed and soft. Denies abdominal pain and cramping.    Denies CP, SOB, dizziness/lightheadedness.      Wt Readings from Last 4 Encounters:   09/23/19 58.2 kg (128 lb 6.4 oz)   09/19/19 58.2 kg (128 lb 3.2 oz)   09/18/19 58.2 kg (128 lb 3.2 oz)   09/10/19 58.2 kg (128 lb 3.2 oz)       Past Medical and Surgical History reviewed in Epic today.    MEDICATIONS:  Current Outpatient Medications   Medication Sig Dispense Refill     acetaminophen (TYLENOL) 500 MG tablet Take 2 tablets (1,000 mg) by mouth 3 times daily       amLODIPine (NORVASC) 10 MG tablet Take 1 tablet (10 mg) by mouth At Bedtime 90 tablet 1     aspirin (ASA) 325 MG EC tablet Take 1 tablet (325 mg) by mouth daily 30 tablet 0     Calcium Carbonate (CALCIUM 600 PO) Take 1 tablet by mouth 2 times daily        Cholecalciferol (VITAMIN D3) 2000 UNITS CAPS Take 2,000 Units by mouth daily (with dinner)       cloNIDine (CATAPRES) 0.1 MG tablet TAKE ONE TABLET BY MOUTH TWO TIMES A  tablet 0     docusate sodium (COLACE) 100 MG capsule Take 1 capsule (100 mg) by mouth At Bedtime       estradiol (ESTRACE) 0.1 MG/GM vaginal cream Place 2 g vaginally three times a week paraben-free 6 g 3     gabapentin (NEURONTIN) 100 MG capsule Take 2 capsules (200 mg) by mouth 2 times daily 120 capsule 0     isosorbide dinitrate (ISORDIL) 10 MG tablet Take 1 tablet (10 mg) by mouth 3 times daily 270 tablet 3     Lidocaine (LIDOCARE) 4 % Patch Apply to low back topically one time a day for pain and remove per schedule as needed.       metoprolol succinate ER (TOPROL-XL) 100 MG 24 hr tablet Take 0.5 tablets (50 mg) by mouth 2 times daily 90 tablet 1     Multiple Vitamins-Minerals (PRESERVISION AREDS) CAPS Take 1  capsule by mouth 2 times daily        omeprazole (PRILOSEC) 20 MG DR capsule Take 1 capsule (20 mg) by mouth every morning (before breakfast) 30 capsule 0     ondansetron (ZOFRAN-ODT) 4 MG ODT tab Take 1 tablet (4 mg) by mouth every 6 hours as needed for nausea or vomiting       oxyCODONE (ROXICODONE) 5 MG tablet Take 0.5 tablets (2.5 mg) by mouth every 4 hours as needed for severe pain 15 tablet 0     phenazopyridine (PYRIDIUM) 100 MG tablet Take 1 tablet (100 mg) by mouth 2 times daily       polyethylene glycol (MIRALAX/GLYCOLAX) packet Take 17 g by mouth daily as needed for constipation       senna-docusate (SENOKOT-S/PERICOLACE) 8.6-50 MG tablet Take 2 tablets by mouth 2 times daily as needed for constipation       sertraline (ZOLOFT) 100 MG tablet Take 1 tablet (100 mg) by mouth daily 90 tablet 1     vancomycin (VANCOCIN) 125 MG capsule Take 125 mg by mouth 3 times daily       vitamin C (ASCORBIC ACID) 500 MG tablet Take 500 mg by mouth daily         REVIEW OF SYSTEMS:  6 point ROS of systems including Respiratory, Cardiovascular, Gastroenterology, Genitourinary, Musculoskeletal, Psychiatric were all negative except for pertinent positives noted in my HPI.    Objective:  BP (!) 159/78   Pulse 60   Temp 97  F (36.1  C)   Resp 18   Ht 1.524 m (5')   Wt 58.2 kg (128 lb 6.4 oz)   SpO2 93%   BMI 25.08 kg/m     Exam:  GENERAL APPEARANCE:  Alert, pleasant and cooperative, elderly female sitting in wheelchair on exam today  HEENT: normocephalic, moist mucous membranes, nose without drainage or crusting, hearing acuity: intact  RESP:  respiratory effort normal, no respiratory distress, Lung sounds clear, patient is on RA  CV: auscultation of heart done, rate and rhythm regular. no murmur, no rub or gallop. No peripheral edema,   ABDOMEN:  + bowel sounds, soft, nontender, no grimacing or guarding with palpation.  M/S:   Gait and station abnormal: NWB to RLE; able to wiggle toes well- has ACE and knee brace in  place to right knee  PSYCH: affect and mood normal    Labs:   Labs done in SNF are in [place] EPIC. Please refer to them using [facility] Everywhere. and Recent labs in [facility] reviewed by me today.     ASSESSMENT/PLAN:  (R35.0) Urinary frequency  Comment: Acute - maybe chronic?, with frequency, no dysuria, afebrile- just completed cipro 9/21 for UTI with UC + >100,000 copies of Klebsiella pneumoniae.   Plan: Bladder scan x3 after urination unless patient requires straight cath, then continue indefinitely for now. Straight catheterize if PVR>300cc. If patient requires straight cath x3 please place vallecillo. Encourage hydration. Continue vaginal estradiol 3 times per week.     (Z86.19) Hx of Clostridium difficile infection  Comment: Acute on chronic- reports bowels are formed to her knowledge and noted in nursing documentation -completed antibiotics recently  -completed vanco taper 9/3  Plan: Oral vancomycin 125 mg po TID x10 days (through 9/25) for prophy dosing since just completed oral vanco taper. Monitor for reoccurence      (S82.101D,  S82.831D) Closed fracture of proximal end of right tibia and fibula with routine healing, subsequent encounter    (Z96.7, Z87.81) S/p ORIF  Comment: Acute, reports pain 3-4/10-slowly improving- using oxy less frequently than last week-but not at goal  -S/p ORIF to right proximal tibia fracture on 8/28/19.   Plan: Continue scheduled tylenol TID. Continue oxycodone PRN- to continue for shortest duration as possible. ASA for DVT prophy. NWB to RLE- brace to be worn with activity, can be removed at rest. Continue bowel regimen. Follow up with ortho as scheduled.     (I10) Essential hypertension  Comment: Acute on chronic, BP variable -165, outliers 174, 183, 190  -Metoprolol XL changed to 50 mg BID per discussion with geriatric pharmacist  Plan: Will not make any changes to medications today due to varying BP- to avoid hypotension, falls, frailty. Based on JNC-8 goals,  patients age  of 88 year old, no presence of diabetes or CKD, and goals of care goal BP is <150/90  mm Hg. Continue amlodipine qhs. Continue metoprolol XL 50 mg BID. Continue PTA imdur, clonidine. Notify provider if SBP >150/90 x3 consecutive BPs. VS per TCU policy.       (D64.9) Anemia, unspecified type  Comment: Acute on chronic, hgb at baseline-suspected to be due to recent surgery- no blood in urine or stool  Hemoglobin   Date Value Ref Range Status   09/17/2019 9.4 (A) 12.0 - 16.0 g/dL Final   09/09/2019 8.3 (A) 12.0 - 16.0 g/dL Final     -baseline 8-10  Plan: Hgb  PRN. Monitor for s/sx of bleeding.     (Z87.19) History of GI bleed  Comment: With possible GI bleed in 6/2019-no blood in stool or dark stool per nursing  Plan: Continue PTA omeprazole. Will hold fish oil while on ASA at TCU. Monitor     (R53.81) Physical deconditioning  Comment: Acute, secondary to recent hospitalization, medical conditions as above- progress with therapies slow- is NWB  Plan: Encourage participation in physical therapy/occupational therapy for strengthening and deconditioning. Discharge planning per their recommendation. Social work to assist with d/c planning.     Other chronic issues reviewed:  (F41.1) Generalized anxiety disorder  Comment: chronic  Plan: Continue PTA sertraline. Monitor      (Z87.81) History of compression fracture of spine  Comment: Chronic  Plan: Continue PTA lidocaine patch, gabapentin for pain. Monitor.      (D69.6) Thrombocytopenia (H)  Comment: Chronic, above baseline at last check   -Baseline variable 90s-200s            Platelet Count   Date Value Ref Range Status   09/04/2019 241 140 - 440 thou/uL Final      Plan: CBC PRN. Monitor for s/sx of bleeding.        Electronically signed by:  KARTHIK Walter CNP           Sincerely,        KARTHIK Walter CNP

## 2019-09-25 ENCOUNTER — RECORDS - HEALTHEAST (OUTPATIENT)
Dept: LAB | Facility: CLINIC | Age: 84
End: 2019-09-25

## 2019-09-26 ENCOUNTER — TRANSFERRED RECORDS (OUTPATIENT)
Dept: HEALTH INFORMATION MANAGEMENT | Facility: CLINIC | Age: 84
End: 2019-09-26

## 2019-09-26 ENCOUNTER — NURSING HOME VISIT (OUTPATIENT)
Dept: GERIATRICS | Facility: CLINIC | Age: 84
End: 2019-09-26
Payer: MEDICARE

## 2019-09-26 VITALS
DIASTOLIC BLOOD PRESSURE: 72 MMHG | SYSTOLIC BLOOD PRESSURE: 155 MMHG | HEIGHT: 60 IN | OXYGEN SATURATION: 94 % | TEMPERATURE: 98.4 F | RESPIRATION RATE: 18 BRPM | HEART RATE: 61 BPM | WEIGHT: 128.4 LBS | BODY MASS INDEX: 25.21 KG/M2

## 2019-09-26 DIAGNOSIS — I10 ESSENTIAL HYPERTENSION: Primary | ICD-10-CM

## 2019-09-26 DIAGNOSIS — E87.6 HYPOKALEMIA: ICD-10-CM

## 2019-09-26 LAB
ANION GAP SERPL CALCULATED.3IONS-SCNC: 8 MMOL/L (ref 5–18)
ANION GAP SERPL CALCULATED.3IONS-SCNC: 8 MMOL/L (ref 5–18)
BUN SERPL-MCNC: 19 MG/DL (ref 8–28)
BUN SERPL-MCNC: 19 MG/DL (ref 8–28)
CALCIUM SERPL-MCNC: 9.6 MG/DL (ref 8.5–10.5)
CALCIUM SERPL-MCNC: 9.6 MG/DL (ref 8.5–10.5)
CHLORIDE BLD-SCNC: 103 MMOL/L (ref 98–107)
CHLORIDE SERPLBLD-SCNC: 103 MMOL/L (ref 98–107)
CO2 SERPL-SCNC: 27 MMOL/L (ref 22–31)
CO2 SERPL-SCNC: 27 MMOL/L (ref 22–31)
CREAT SERPL-MCNC: 0.69 MG/DL (ref 0.6–1.1)
CREAT SERPL-MCNC: 0.69 MG/DL (ref 0.6–1.1)
GFR SERPL CREATININE-BSD FRML MDRD: >60 ML/MIN/1.73M2
GFR SERPL CREATININE-BSD FRML MDRD: >60 ML/MIN/1.73M2
GLUCOSE BLD-MCNC: 88 MG/DL (ref 70–125)
GLUCOSE SERPL-MCNC: 88 MG/DL (ref 70–125)
POTASSIUM BLD-SCNC: 3.9 MMOL/L (ref 3.5–5)
POTASSIUM SERPL-SCNC: 3.9 MMOL/L (ref 3.5–5)
SODIUM SERPL-SCNC: 138 MMOL/L (ref 136–145)
SODIUM SERPL-SCNC: 138 MMOL/L (ref 136–145)

## 2019-09-26 PROCEDURE — 99308 SBSQ NF CARE LOW MDM 20: CPT | Performed by: NURSE PRACTITIONER

## 2019-09-26 RX ORDER — CARVEDILOL 25 MG/1
12.5 TABLET ORAL 2 TIMES DAILY WITH MEALS
Start: 2019-09-26 | End: 2019-11-12

## 2019-09-26 ASSESSMENT — MIFFLIN-ST. JEOR: SCORE: 933.92

## 2019-09-26 NOTE — PROGRESS NOTES
[place] [facility]  [place] Medical Record Number:  [id number]  Place of Service where encounter took place:  [facility] ([facility]) [[id number]]  Chief Complaint   Patient presents with     Nursing Home Acute       HPI:    Daya Ignacio  is a 88 year old (10/11/1930), who is being seen today for an episodic care visit.  HPI information obtained from: facility chart records, facility staff and [facility] chart review.     Brief Summary of Hospital/ TCU Course: Ms. Daya Ignacio is an 88 year old female with PMH of HTN, lumbar compression fractures, upper GI bleed, generalized anxiety disorder, recurrent ESBL UTI and c.diff infection. Patient returned to ED with fall after discharge from the hospital 12 hours earlier after being admitted for UTI for 6 days. Per hospital notes she was walking in her kitchen and became unwell, fell and instantly had pain in her right leg. S/p ORIF to right proximal tibia fracture on  8/28/19. Is NWB to RLE- should wear knee immobilizer at all times except for with bathing and ice. Developed leukocytosis, but suspect secondary to trauma. With multiple recurrent hospitalizations this past year for ESBL UTI and c.difficile. Completed c.diff taper 9/3. Discharged to TCU.               At TCU Ms. Ignacio was found to UTI from Klebsiella pneumoniae. Treated with 7 days of cipro (completes 9/21). While on antibiotics she received oral vanco prophy.     Today's concern is:  Hypertension. Notified by nursing staff that BP have been elevated, generally appear to be worse in AM and improving throughout day. SBP in AM over past few days running 160s-190. As day progresses by afternoon and evening BP is improved, but often still running SBP 150s, occasionally is lower. HR 57-72, most often in the 60s.    BMP also pending today after hypokalemia earlier this week.    Past Medical and Surgical History reviewed in Epic today.    MEDICATIONS:  Current Outpatient  Medications   Medication Sig Dispense Refill     acetaminophen (TYLENOL) 500 MG tablet Take 2 tablets (1,000 mg) by mouth 3 times daily       amLODIPine (NORVASC) 10 MG tablet Take 1 tablet (10 mg) by mouth At Bedtime 90 tablet 1     aspirin (ASA) 325 MG EC tablet Take 1 tablet (325 mg) by mouth daily 30 tablet 0     Calcium Carbonate (CALCIUM 600 PO) Take 1 tablet by mouth 2 times daily        carvedilol (COREG) 25 MG tablet Take 0.5 tablets (12.5 mg) by mouth 2 times daily (with meals)       Cholecalciferol (VITAMIN D3) 2000 UNITS CAPS Take 2,000 Units by mouth daily (with dinner)       cloNIDine (CATAPRES) 0.1 MG tablet TAKE ONE TABLET BY MOUTH TWO TIMES A  tablet 0     docusate sodium (COLACE) 100 MG capsule Take 1 capsule (100 mg) by mouth At Bedtime       estradiol (ESTRACE) 0.1 MG/GM vaginal cream Place 2 g vaginally three times a week paraben-free 6 g 3     gabapentin (NEURONTIN) 100 MG capsule Take 2 capsules (200 mg) by mouth 2 times daily 120 capsule 0     isosorbide dinitrate (ISORDIL) 10 MG tablet Take 1 tablet (10 mg) by mouth 3 times daily 270 tablet 3     Lidocaine (LIDOCARE) 4 % Patch Apply to low back topically one time a day for pain and remove per schedule as needed.       Multiple Vitamins-Minerals (PRESERVISION AREDS) CAPS Take 1 capsule by mouth 2 times daily        omeprazole (PRILOSEC) 20 MG DR capsule Take 1 capsule (20 mg) by mouth every morning (before breakfast) 30 capsule 0     ondansetron (ZOFRAN-ODT) 4 MG ODT tab Take 1 tablet (4 mg) by mouth every 6 hours as needed for nausea or vomiting       oxyCODONE (ROXICODONE) 5 MG tablet Take 0.5 tablets (2.5 mg) by mouth every 4 hours as needed for severe pain 15 tablet 0     polyethylene glycol (MIRALAX/GLYCOLAX) packet Take 17 g by mouth daily as needed for constipation       senna-docusate (SENOKOT-S/PERICOLACE) 8.6-50 MG tablet Take 2 tablets by mouth 2 times daily as needed for constipation       sertraline (ZOLOFT) 100 MG tablet  Take 1 tablet (100 mg) by mouth daily 90 tablet 1     vitamin C (ASCORBIC ACID) 500 MG tablet Take 500 mg by mouth daily           Objective:  BP (!) 155/72   Pulse 61   Temp 98.4  F (36.9  C)   Resp 18   Ht 1.524 m (5')   Wt 58.2 kg (128 lb 6.4 oz)   SpO2 94%   BMI 25.08 kg/m    Exam:  GENERAL APPEARANCE:  Alert, elderly female sitting in wheelchair on exam today  HEENT: normocephalic, moist mucous membranes, nose without drainage or crusting, hearing acuity: intact  RESP:  respiratory effort normal, no respiratory distress    Labs:   Recent labs in [facility] reviewed by me today.     ASSESSMENT/PLAN:  (I10) Essential hypertension  Comment: Acute on chronic, BP not meeting goal  -Metoprolol XL changed to 50 mg BID per discussion with geriatric pharmacist, however not enough improvement in BP, so rediscussed with pharmacist today and will change to carvedilol  Plan:  Based on JNC-8 goals,  patients age of 88 year old, no presence of diabetes or CKD, and goals of care goal BP is <150/90 mm Hg. Discontinue metoprolol. Start carvedilol 12.5 mg BID. Continue amlodipine qhs. Continue PTA imdur, clonidine. Notify provider if SBP >150/90 x3 consecutive BPs. VS per TCU policy.      (E87.6) Hypokalemia  Comment: acute, uncertain of cause- not on diuretics, no loose stools/ emesis  -received 20 meq of potassium on 9/23.  Plan: BMP pending today. Will make plan based on results.    Electronically signed by:  KARTHIK Walter CNP

## 2019-09-26 NOTE — LETTER
9/26/2019        RE: Daya Ignacio  [address]  [facility] [address]        [place] [facility]  [place] Medical Record Number:  [id number]  Place of Service where encounter took place:  [facility] ([facility]) [[id number]]  Chief Complaint   Patient presents with     Nursing Home Acute       HPI:    Daya Ignacio  is a 88 year old (10/11/1930), who is being seen today for an episodic care visit.  HPI information obtained from: facility chart records, facility staff and [facility] chart review.     Brief Summary of Hospital/ TCU Course: Ms. Daya Ignacio is an 88 year old female with PMH of HTN, lumbar compression fractures, upper GI bleed, generalized anxiety disorder, recurrent ESBL UTI and c.diff infection. Patient returned to ED with fall after discharge from the hospital 12 hours earlier after being admitted for UTI for 6 days. Per hospital notes she was walking in her kitchen and became unwell, fell and instantly had pain in her right leg. S/p ORIF to right proximal tibia fracture on  8/28/19. Is NWB to RLE- should wear knee immobilizer at all times except for with bathing and ice. Developed leukocytosis, but suspect secondary to trauma. With multiple recurrent hospitalizations this past year for ESBL UTI and c.difficile. Completed c.diff taper 9/3. Discharged to TCU.               At TCU Ms. Ignacio was found to UTI from Klebsiella pneumoniae. Treated with 7 days of cipro (completes 9/21). While on antibiotics she received oral vanco prophy.     Today's concern is:  Hypertension. Notified by nursing staff that BP have been elevated, generally appear to be worse in AM and improving throughout day. SBP in AM over past few days running 160s-190. As day progresses by afternoon and evening BP is improved, but often still running SBP 150s, occasionally is lower. HR 57-72, most often in the 60s.    BMP also pending today after hypokalemia earlier this  week.    Past Medical and Surgical History reviewed in Epic today.    MEDICATIONS:  Current Outpatient Medications   Medication Sig Dispense Refill     acetaminophen (TYLENOL) 500 MG tablet Take 2 tablets (1,000 mg) by mouth 3 times daily       amLODIPine (NORVASC) 10 MG tablet Take 1 tablet (10 mg) by mouth At Bedtime 90 tablet 1     aspirin (ASA) 325 MG EC tablet Take 1 tablet (325 mg) by mouth daily 30 tablet 0     Calcium Carbonate (CALCIUM 600 PO) Take 1 tablet by mouth 2 times daily        carvedilol (COREG) 25 MG tablet Take 0.5 tablets (12.5 mg) by mouth 2 times daily (with meals)       Cholecalciferol (VITAMIN D3) 2000 UNITS CAPS Take 2,000 Units by mouth daily (with dinner)       cloNIDine (CATAPRES) 0.1 MG tablet TAKE ONE TABLET BY MOUTH TWO TIMES A  tablet 0     docusate sodium (COLACE) 100 MG capsule Take 1 capsule (100 mg) by mouth At Bedtime       estradiol (ESTRACE) 0.1 MG/GM vaginal cream Place 2 g vaginally three times a week paraben-free 6 g 3     gabapentin (NEURONTIN) 100 MG capsule Take 2 capsules (200 mg) by mouth 2 times daily 120 capsule 0     isosorbide dinitrate (ISORDIL) 10 MG tablet Take 1 tablet (10 mg) by mouth 3 times daily 270 tablet 3     Lidocaine (LIDOCARE) 4 % Patch Apply to low back topically one time a day for pain and remove per schedule as needed.       Multiple Vitamins-Minerals (PRESERVISION AREDS) CAPS Take 1 capsule by mouth 2 times daily        omeprazole (PRILOSEC) 20 MG DR capsule Take 1 capsule (20 mg) by mouth every morning (before breakfast) 30 capsule 0     ondansetron (ZOFRAN-ODT) 4 MG ODT tab Take 1 tablet (4 mg) by mouth every 6 hours as needed for nausea or vomiting       oxyCODONE (ROXICODONE) 5 MG tablet Take 0.5 tablets (2.5 mg) by mouth every 4 hours as needed for severe pain 15 tablet 0     polyethylene glycol (MIRALAX/GLYCOLAX) packet Take 17 g by mouth daily as needed for constipation       senna-docusate (SENOKOT-S/PERICOLACE) 8.6-50 MG tablet  Take 2 tablets by mouth 2 times daily as needed for constipation       sertraline (ZOLOFT) 100 MG tablet Take 1 tablet (100 mg) by mouth daily 90 tablet 1     vitamin C (ASCORBIC ACID) 500 MG tablet Take 500 mg by mouth daily           Objective:  BP (!) 155/72   Pulse 61   Temp 98.4  F (36.9  C)   Resp 18   Ht 1.524 m (5')   Wt 58.2 kg (128 lb 6.4 oz)   SpO2 94%   BMI 25.08 kg/m     Exam:  GENERAL APPEARANCE:  Alert, elderly female sitting in wheelchair on exam today  HEENT: normocephalic, moist mucous membranes, nose without drainage or crusting, hearing acuity: intact  RESP:  respiratory effort normal, no respiratory distress    Labs:   Recent labs in [facility] reviewed by me today.     ASSESSMENT/PLAN:  (I10) Essential hypertension  Comment: Acute on chronic, BP not meeting goal  -Metoprolol XL changed to 50 mg BID per discussion with geriatric pharmacist, however not enough improvement in BP, so rediscussed with pharmacist today and will change to carvedilol  Plan:  Based on JNC-8 goals,  patients age of 88 year old, no presence of diabetes or CKD, and goals of care goal BP is <150/90 mm Hg. Discontinue metoprolol. Start carvedilol 12.5 mg BID. Continue amlodipine qhs. Continue PTA imdur, clonidine. Notify provider if SBP >150/90 x3 consecutive BPs. VS per TCU policy.      (E87.6) Hypokalemia  Comment: acute, uncertain of cause- not on diuretics, no loose stools/ emesis  -received 20 meq of potassium on 9/23.  Plan: BMP pending today. Will make plan based on results.    Electronically signed by:  KARTHIK Walter CNP           Sincerely,        KARTHIK Walter CNP

## 2019-10-08 ENCOUNTER — TRANSFERRED RECORDS (OUTPATIENT)
Dept: HEALTH INFORMATION MANAGEMENT | Facility: CLINIC | Age: 84
End: 2019-10-08

## 2019-10-08 NOTE — PROGRESS NOTES
[facility]  [place] [facility] Record Number:  [id number]  Place of Service where encounter took place:  [facility] ([facility]) [[id number]]  Chief Complaint   Patient presents with     RECHECK       HPI:    Daya Ignacio  is a 88 year old (10/11/1930), who is being seen today for an episodic care visit.  HPI information obtained from: facility chart records, facility staff, patient report and [facility] chart review.     Brief Summary of Hospital/ TCU Course: Ms. Daya Ignacio is an 88 year old female with PMH of HTN, lumbar compression fractures, upper GI bleed, generalized anxiety disorder, recurrent ESBL UTI and c.diff infection. Patient returned to ED with fall after discharge from the hospital 12 hours earlier after being admitted for UTI for 6 days. Per hospital notes she was walking in her kitchen and became unwell, fell and instantly had pain in her right leg. S/p ORIF to right proximal tibia fracture on  8/28/19. Is NWB to RLE- should wear knee immobilizer at all times except for with bathing and ice. Developed leukocytosis, but suspect secondary to trauma. With multiple recurrent hospitalizations this past year for ESBL UTI and c.difficile. Completed c.diff taper 9/3. Discharged to TCU.               At TCU Ms. Ignacio was found to UTI from Klebsiella pneumoniae. Treated with 7 days of cipro (completes 9/21). While on antibiotics she received oral vanco prophy.     Today's concern is:  Ms. Ignacio was seen today for routine follow up at TCU. She tells me she is doing well. She had ortho appointment yesterday. It is ok to start TTWB now and transition to WBAT over next 6 weeks. Ok to remove brace at rest and at night, may unlock brace for work with PT. Overall she feels her pain is improving. Still with pain at times- at its worse pain increases to 7/10. Continues to use oxycodone infrequently about 1-2 times per week.     Continues to have urinary frequency. PVRs  have all been under 300 and she has not required straight catheterization. She feels as though she is emptying her bladder. Denies dysuria.     Per nursing documentation she is having irregular bowel movements. Patient denies any constipation today.     Denies SOB, CP, dizziness/ lightheadedness, swelling.     She continues to have some elevated BPs at time, however after taking BP medications they are dropping well within goal, to almost low at times. Yesterday nursing charted /79 before medications and upon return after ortho appointment BP 97/53. Earlier this week BP before medications 186/76 and after medications was 110/60.    Patient had pressure area 2cm x 2cm to mid back on admission to TCU. Was completely healed per nursing staff on 9/19, so dressing orders were discontinued.       Wt Readings from Last 4 Encounters:   10/09/19 60.3 kg (133 lb)   09/26/19 58.2 kg (128 lb 6.4 oz)   09/23/19 58.2 kg (128 lb 6.4 oz)   09/19/19 58.2 kg (128 lb 3.2 oz)       Past Medical and Surgical History reviewed in Epic today.    MEDICATIONS:  Current Outpatient Medications   Medication Sig Dispense Refill     acetaminophen (TYLENOL) 500 MG tablet Take 2 tablets (1,000 mg) by mouth 3 times daily       amLODIPine (NORVASC) 10 MG tablet Take 1 tablet (10 mg) by mouth At Bedtime 90 tablet 1     aspirin (ASA) 325 MG EC tablet Take 1 tablet (325 mg) by mouth daily 30 tablet 0     Calcium Carbonate (CALCIUM 600 PO) Take 1 tablet by mouth 2 times daily        carvedilol (COREG) 25 MG tablet Take 0.5 tablets (12.5 mg) by mouth 2 times daily (with meals)       Cholecalciferol (VITAMIN D3) 2000 UNITS CAPS Take 2,000 Units by mouth daily (with dinner)       cloNIDine (CATAPRES) 0.1 MG tablet TAKE ONE TABLET BY MOUTH TWO TIMES A  tablet 0     docusate sodium (COLACE) 100 MG capsule Take 1 capsule (100 mg) by mouth At Bedtime       estradiol (ESTRACE) 0.1 MG/GM vaginal cream Place 2 g vaginally three times a week  paraben-free 6 g 3     gabapentin (NEURONTIN) 100 MG capsule Take 2 capsules (200 mg) by mouth 2 times daily 120 capsule 0     isosorbide dinitrate (ISORDIL) 10 MG tablet Take 1 tablet (10 mg) by mouth 3 times daily 270 tablet 3     Lidocaine (LIDOCARE) 4 % Patch Apply to low back topically one time a day for pain and remove per schedule as needed.       Multiple Vitamins-Minerals (PRESERVISION AREDS) CAPS Take 1 capsule by mouth 2 times daily        omeprazole (PRILOSEC) 10 MG DR capsule Take 20 mg by mouth daily       ondansetron (ZOFRAN-ODT) 4 MG ODT tab Take 1 tablet (4 mg) by mouth every 6 hours as needed for nausea or vomiting       oxyCODONE (ROXICODONE) 5 MG tablet Take 0.5 tablets (2.5 mg) by mouth every 4 hours as needed for severe pain 15 tablet 0     polyethylene glycol (MIRALAX/GLYCOLAX) packet Take 17 g by mouth daily as needed for constipation       senna-docusate (SENOKOT-S/PERICOLACE) 8.6-50 MG tablet Take 2 tablets by mouth 2 times daily as needed for constipation       sertraline (ZOLOFT) 100 MG tablet Take 1 tablet (100 mg) by mouth daily 90 tablet 1     vitamin C (ASCORBIC ACID) 500 MG tablet Take 500 mg by mouth daily         REVIEW OF SYSTEMS:  5 point ROS of systems including Respiratory, Cardiovascular, Gastroenterology, Genitourinary, Musculoskeletal were all negative except for pertinent positives noted in my HPI.    Objective:  BP (!) 160/73   Pulse 63   Temp 96.9  F (36.1  C)   Resp 18   Ht 1.524 m (5')   Wt 60.3 kg (133 lb)   SpO2 97%   BMI 25.97 kg/m    Exam:  GENERAL APPEARANCE:  Alert, pleasant and cooperative, elderly female sitting in wheelchair on exam today  HEENT: normocephalic, moist mucous membranes, nose without drainage or crusting, hearing acuity: intact  RESP:  respiratory effort normal, no respiratory distress, Lung sounds clear, patient is on RA  CV: auscultation of heart done, rate and rhythm regular. no murmur, no rub or gallop. No peripheral edema,    ABDOMEN:  + bowel sounds, soft, nontender, no grimacing or guarding with palpation.  M/S:   Gait and station abnormal: TTWB to RLE; able to wiggle toes well- has knee brace in over right knee/leg  PSYCH: affect and mood normal     Labs:   Labs done in SNF are in [facility]. Please refer to them using EPIC/Care Everywhere. and Recent labs in [facility] reviewed by me today.     ASSESSMENT/PLAN:  (S82.101D,  S82.831D) Closed fracture of proximal end of right tibia and fibula with routine healing, subsequent encounter    (Z96.7, Z87.81) S/p ORIF  Comment: Acute, reports pain overall is improving. At times is increasing to 7/10.- using oxy infrequently, now able to start TTWB per ortho- not at goal  -S/p ORIF to right proximal tibia fracture on 8/28/19.   Plan: Continue scheduled tylenol TID. Continue oxycodone PRN- to continue for shortest duration as possible. ASA for DVT prophy. Ok to start TTWB now and transition to WBAT over next 6 weeks. Ok to remove brace at rest and at night, may unlock brace for work with PT. Follow up with ortho as scheduled.     (I10) Essential hypertension  Comment: Acute on chronic, BP variable control- although is having some substantial drops in BP after taking medications  -Metoprolol XL changed to 50 mg BID per discussion with geriatric pharmacist  Plan: BP goals are ~130 -165/60 -90 mmHg.This is higher than ACC and AHA recommendations due to goals of care, risk for hypotension, risk of dizziness and falls and frailty. Also has been having significant drops in BP after receiving medications. Will not make any changes today and will ask nursing to continue to chart BP after receiving medications. Continue amlodipine qhs. Continue metoprolol XL 50 mg BID. Continue PTA imdur, clonidine. VS per TCU policy.       (D64.9) Anemia, unspecified type  Comment: Acute on chronic, hgb at baseline at last check-suspected to be due to recent surgery    Hemoglobin   Date Value Ref Range Status  "  09/17/2019 9.4 (A) 12.0 - 16.0 g/dL Final   09/09/2019 8.3 (A) 12.0 - 16.0 g/dL Final      -baseline 8-10  Plan: Hgb PRN. Monitor for s/sx of bleeding.     (Z87.19) History of GI bleed  Comment: With possible GI bleed in 6/2019-no blood in stool or dark stool documented  Plan: Continue PTA omeprazole. Hold fish oil while on ASA at TCU. Monitor     (R41.89) Cognitive impairment  Comment: Acute on chronic,   -CPT  4.7/5.6 indicating per therapy notes that,  \"cognitively  may live alone with daily assist to monitor safety and solve new problems occurring through minor changes in environment (would benefit from assist higher level IADLs such as med management/finances).\"  --SLUMS 22/30  Plan: Continue to work with SLP. Nursing to provide supportive cares.     (R53.81) Physical deconditioning  Comment: Acute, secondary to recent hospitalization, medical conditions as above- progress with therapies slow- is now able to TTWB with advancement to WBAT over next 6 weeks  Plan: Encourage participation in physical therapy/occupational therapy for strengthening and deconditioning. Discharge planning per their recommendation. Social work to assist with d/c planning.     (R35.0) Urinary frequency  Comment: Acute- appears to be chronic. Is without other symptoms and is not retaining urine  - completed cipro 9/21 for UTI with UC + >100,000 copies of Klebsiella pneumoniae.   Plan: Discontinue bladder scan orders. Encourage hydration. Continue vaginal estradiol 3 times per week. Monitor    (Z86.19) Hx of Clostridium difficile infection  Comment: no reports of loose stools  -completed vanco taper 9/3  -completed 10 day course of prophy oral vanco on 9/25 while on antibiotics for UTI  Plan: Monitor for reoccurrence.     Other chronic issues reviewed:  (F41.1) Generalized anxiety disorder  Comment: chronic  Plan: Continue PTA sertraline. Monitor      (Z87.81) History of compression fracture of spine  Comment: Chronic  Plan: Continue " PTA lidocaine patch, gabapentin for pain. Monitor.      (D69.6) Thrombocytopenia (H)  Comment: Chronic, above baseline at last check   -Baseline variable 90s-200s            Platelet Count   Date Value Ref Range Status   09/04/2019 241 140 - 440 thou/uL Final      Plan: CBC PRN. Monitor for s/sx of bleeding.           Electronically signed by:  KARTHIK Walter CNP

## 2019-10-09 ENCOUNTER — PATIENT OUTREACH (OUTPATIENT)
Dept: CARE COORDINATION | Facility: CLINIC | Age: 84
End: 2019-10-09

## 2019-10-09 ENCOUNTER — NURSING HOME VISIT (OUTPATIENT)
Dept: GERIATRICS | Facility: CLINIC | Age: 84
End: 2019-10-09
Payer: MEDICARE

## 2019-10-09 VITALS
BODY MASS INDEX: 26.11 KG/M2 | SYSTOLIC BLOOD PRESSURE: 160 MMHG | TEMPERATURE: 96.9 F | OXYGEN SATURATION: 97 % | WEIGHT: 133 LBS | HEIGHT: 60 IN | HEART RATE: 63 BPM | DIASTOLIC BLOOD PRESSURE: 73 MMHG | RESPIRATION RATE: 18 BRPM

## 2019-10-09 DIAGNOSIS — Z87.81 S/P ORIF (OPEN REDUCTION INTERNAL FIXATION) FRACTURE: ICD-10-CM

## 2019-10-09 DIAGNOSIS — Z98.890 S/P ORIF (OPEN REDUCTION INTERNAL FIXATION) FRACTURE: ICD-10-CM

## 2019-10-09 DIAGNOSIS — Z87.19 HISTORY OF GI BLEED: ICD-10-CM

## 2019-10-09 DIAGNOSIS — I10 ESSENTIAL HYPERTENSION: ICD-10-CM

## 2019-10-09 DIAGNOSIS — D64.9 ANEMIA, UNSPECIFIED TYPE: ICD-10-CM

## 2019-10-09 DIAGNOSIS — S82.831D CLOSED FRACTURE OF PROXIMAL END OF RIGHT TIBIA AND FIBULA WITH ROUTINE HEALING, SUBSEQUENT ENCOUNTER: Primary | ICD-10-CM

## 2019-10-09 DIAGNOSIS — R35.0 URINARY FREQUENCY: ICD-10-CM

## 2019-10-09 DIAGNOSIS — R41.89 COGNITIVE IMPAIRMENT: ICD-10-CM

## 2019-10-09 DIAGNOSIS — R53.81 PHYSICAL DECONDITIONING: ICD-10-CM

## 2019-10-09 DIAGNOSIS — S82.101D CLOSED FRACTURE OF PROXIMAL END OF RIGHT TIBIA AND FIBULA WITH ROUTINE HEALING, SUBSEQUENT ENCOUNTER: Primary | ICD-10-CM

## 2019-10-09 DIAGNOSIS — Z86.19 HX OF CLOSTRIDIUM DIFFICILE INFECTION: ICD-10-CM

## 2019-10-09 PROCEDURE — 99309 SBSQ NF CARE MODERATE MDM 30: CPT | Performed by: NURSE PRACTITIONER

## 2019-10-09 ASSESSMENT — MIFFLIN-ST. JEOR: SCORE: 954.78

## 2019-10-09 NOTE — PROGRESS NOTES
Clinic Care Coordination Contact    Situation: Patient chart reviewed by care coordinator.    Background: Patient is enrolled into care coordination with the goal of remaining free from rehospitalization within a 30 day period.     Assessment: Patient still remains at [facility] TCU with most recent visit being today. RNCC faxed another communication to care center requesting updates.     Plan/Recommendations: RN/SW Care Coordinator will await notification from facility staff informing RN/SW Care Coordinator of patient's discharge plans/needs. RN/SW Care Coordinator will review chart and outreach to facility staff every 4 weeks and as needed.     Elizabeth Simmons RN Care Coordinator  [facility]  Email: [email or web address]  Phone: [phone]

## 2019-10-09 NOTE — LETTER
10/9/2019        RE: Daya Ignacio  [address]  [place] [address]        [facility]  [place] Medical Record Number:  [id number]  Place of Service where encounter took place:  [facility] ([facility]) [[id number]]  Chief Complaint   Patient presents with     RECHECK       HPI:    Daya Ignacio  is a 88 year old (10/11/1930), who is being seen today for an episodic care visit.  HPI information obtained from: facility chart records, facility staff, patient report and [facility] chart review.     Brief Summary of Hospital/ TCU Course: Ms. Daya Ignacio is an 88 year old female with PMH of HTN, lumbar compression fractures, upper GI bleed, generalized anxiety disorder, recurrent ESBL UTI and c.diff infection. Patient returned to ED with fall after discharge from the hospital 12 hours earlier after being admitted for UTI for 6 days. Per hospital notes she was walking in her kitchen and became unwell, fell and instantly had pain in her right leg. S/p ORIF to right proximal tibia fracture on  8/28/19. Is NWB to RLE- should wear knee immobilizer at all times except for with bathing and ice. Developed leukocytosis, but suspect secondary to trauma. With multiple recurrent hospitalizations this past year for ESBL UTI and c.difficile. Completed c.diff taper 9/3. Discharged to TCU.               At TCU Ms. Ignacio was found to UTI from Klebsiella pneumoniae. Treated with 7 days of cipro (completes 9/21). While on antibiotics she received oral vanco prophy.     Today's concern is:  Ms. Ignacio was seen today for routine follow up at TCU. She tells me she is doing well. She had ortho appointment yesterday. It is ok to start TTWB now and transition to WBAT over next 6 weeks. Ok to remove brace at rest and at night, may unlock brace for work with PT. Overall she feels her pain is improving. Still with pain at times- at its worse pain increases to 7/10. Continues  to use oxycodone infrequently about 1-2 times per week.     Continues to have urinary frequency. PVRs have all been under 300 and she has not required straight catheterization. She feels as though she is emptying her bladder. Denies dysuria.     Per nursing documentation she is having irregular bowel movements. Patient denies any constipation today.     Denies SOB, CP, dizziness/ lightheadedness, swelling.     She continues to have some elevated BPs at time, however after taking BP medications they are dropping well within goal, to almost low at times. Yesterday nursing charted /79 before medications and upon return after ortho appointment BP 97/53. Earlier this week BP before medications 186/76 and after medications was 110/60.    Patient had pressure area 2cm x 2cm to mid back on admission to TCU. Was completely healed per nursing staff on 9/19, so dressing orders were discontinued.       Wt Readings from Last 4 Encounters:   10/09/19 60.3 kg (133 lb)   09/26/19 58.2 kg (128 lb 6.4 oz)   09/23/19 58.2 kg (128 lb 6.4 oz)   09/19/19 58.2 kg (128 lb 3.2 oz)       Past Medical and Surgical History reviewed in Epic today.    MEDICATIONS:  Current Outpatient Medications   Medication Sig Dispense Refill     acetaminophen (TYLENOL) 500 MG tablet Take 2 tablets (1,000 mg) by mouth 3 times daily       amLODIPine (NORVASC) 10 MG tablet Take 1 tablet (10 mg) by mouth At Bedtime 90 tablet 1     aspirin (ASA) 325 MG EC tablet Take 1 tablet (325 mg) by mouth daily 30 tablet 0     Calcium Carbonate (CALCIUM 600 PO) Take 1 tablet by mouth 2 times daily        carvedilol (COREG) 25 MG tablet Take 0.5 tablets (12.5 mg) by mouth 2 times daily (with meals)       Cholecalciferol (VITAMIN D3) 2000 UNITS CAPS Take 2,000 Units by mouth daily (with dinner)       cloNIDine (CATAPRES) 0.1 MG tablet TAKE ONE TABLET BY MOUTH TWO TIMES A  tablet 0     docusate sodium (COLACE) 100 MG capsule Take 1 capsule (100 mg) by mouth At  Bedtime       estradiol (ESTRACE) 0.1 MG/GM vaginal cream Place 2 g vaginally three times a week paraben-free 6 g 3     gabapentin (NEURONTIN) 100 MG capsule Take 2 capsules (200 mg) by mouth 2 times daily 120 capsule 0     isosorbide dinitrate (ISORDIL) 10 MG tablet Take 1 tablet (10 mg) by mouth 3 times daily 270 tablet 3     Lidocaine (LIDOCARE) 4 % Patch Apply to low back topically one time a day for pain and remove per schedule as needed.       Multiple Vitamins-Minerals (PRESERVISION AREDS) CAPS Take 1 capsule by mouth 2 times daily        omeprazole (PRILOSEC) 10 MG DR capsule Take 20 mg by mouth daily       ondansetron (ZOFRAN-ODT) 4 MG ODT tab Take 1 tablet (4 mg) by mouth every 6 hours as needed for nausea or vomiting       oxyCODONE (ROXICODONE) 5 MG tablet Take 0.5 tablets (2.5 mg) by mouth every 4 hours as needed for severe pain 15 tablet 0     polyethylene glycol (MIRALAX/GLYCOLAX) packet Take 17 g by mouth daily as needed for constipation       senna-docusate (SENOKOT-S/PERICOLACE) 8.6-50 MG tablet Take 2 tablets by mouth 2 times daily as needed for constipation       sertraline (ZOLOFT) 100 MG tablet Take 1 tablet (100 mg) by mouth daily 90 tablet 1     vitamin C (ASCORBIC ACID) 500 MG tablet Take 500 mg by mouth daily         REVIEW OF SYSTEMS:  5 point ROS of systems including Respiratory, Cardiovascular, Gastroenterology, Genitourinary, Musculoskeletal were all negative except for pertinent positives noted in my HPI.    Objective:  BP (!) 160/73   Pulse 63   Temp 96.9  F (36.1  C)   Resp 18   Ht 1.524 m (5')   Wt 60.3 kg (133 lb)   SpO2 97%   BMI 25.97 kg/m     Exam:  GENERAL APPEARANCE:  Alert, pleasant and cooperative, elderly female sitting in wheelchair on exam today  HEENT: normocephalic, moist mucous membranes, nose without drainage or crusting, hearing acuity: intact  RESP:  respiratory effort normal, no respiratory distress, Lung sounds clear, patient is on RA  CV: auscultation of  heart done, rate and rhythm regular. no murmur, no rub or gallop. No peripheral edema,   ABDOMEN:  + bowel sounds, soft, nontender, no grimacing or guarding with palpation.  M/S:   Gait and station abnormal: TTWB to RLE; able to wiggle toes well- has knee brace in over right knee/leg  PSYCH: affect and mood normal     Labs:   Labs done in SNF are in [facility]. Please refer to them using EPIC/Care Everywhere. and [facility] in [facility] reviewed by me today.     ASSESSMENT/PLAN:  (S82.101D,  S82.831D) Closed fracture of proximal end of right tibia and fibula with routine healing, subsequent encounter    (Z96.7, Z87.81) S/p ORIF  Comment: Acute, reports pain overall is improving. At times is increasing to 7/10.- using oxy infrequently, now able to start TTWB per ortho- not at goal  -S/p ORIF to right proximal tibia fracture on 8/28/19.   Plan: Continue scheduled tylenol TID. Continue oxycodone PRN- to continue for shortest duration as possible. ASA for DVT prophy. Ok to start TTWB now and transition to WBAT over next 6 weeks. Ok to remove brace at rest and at night, may unlock brace for work with PT. Follow up with ortho as scheduled.     (I10) Essential hypertension  Comment: Acute on chronic, BP variable  control- although is having some substantial drops in BP after taking medications  -Metoprolol XL changed to 50 mg BID per discussion with geriatric pharmacist  Plan: BP goals are ~130 -165/60 -90 mmHg.This is higher than ACC and AHA recommendations due to goals of care, risk for hypotension, risk of dizziness and falls and frailty. Also has been having significant drops in BP after receiving medications. Will not make any changes today and will ask nursing to continue to chart BP after receiving medications. Continue amlodipine qhs. Continue metoprolol XL 50 mg BID. Continue PTA imdur, clonidine. VS per TCU policy.       (D64.9) Anemia, unspecified type  Comment: Acute on chronic, hgb at baseline at last  "check-suspected to be due to recent surgery    Hemoglobin   Date Value Ref Range Status   09/17/2019 9.4 (A) 12.0 - 16.0 g/dL Final   09/09/2019 8.3 (A) 12.0 - 16.0 g/dL Final      -baseline 8-10  Plan: Hgb PRN. Monitor for s/sx of bleeding.     (Z87.19) History of GI bleed  Comment: With possible GI bleed in 6/2019-no blood in stool or dark stool documented  Plan: Continue PTA omeprazole. Hold fish oil while on ASA at TCU. Monitor     (R41.89) Cognitive impairment  Comment: Acute on chronic,   -CPT  4.7/5.6 indicating per therapy notes that,  \"cognitively  may live alone with daily assist to monitor safety and solve new problems occurring through minor changes in environment (would benefit from assist higher level IADLs such as med management/finances).\"  --SLUMS 22/30  Plan: Continue to work with SLP. Nursing to provide supportive cares.     (R53.81) Physical deconditioning  Comment: Acute, secondary to recent hospitalization, medical conditions as above- progress with therapies slow- is now able to TTWB with advancement to WBAT over next 6 weeks  Plan: Encourage participation in physical therapy/occupational therapy for strengthening and deconditioning. Discharge planning per their recommendation. Social work to assist with d/c planning.     (R35.0) Urinary frequency  Comment: Acute- appears to be chronic. Is without other symptoms and is not retaining urine  - completed cipro 9/21 for UTI with UC + >100,000 copies of Klebsiella pneumoniae.   Plan:  Discontinue bladder scan orders. Encourage hydration. Continue vaginal estradiol 3 times per week. Monitor    (Z86.19) Hx of Clostridium difficile infection  Comment: no reports of loose stools  -completed vanco taper 9/3  -completed 10 day course of prophy oral vanco on 9/25 while on antibiotics for UTI  Plan: Monitor for reoccurrence.     Other chronic issues reviewed:  (F41.1) Generalized anxiety disorder  Comment: chronic  Plan: Continue PTA sertraline. " Monitor      (Z87.81) History of compression fracture of spine  Comment: Chronic  Plan: Continue PTA lidocaine patch, gabapentin for pain. Monitor.      (D69.6) Thrombocytopenia (H)  Comment: Chronic, above baseline at last check   -Baseline variable 90s-200s            Platelet Count   Date Value Ref Range Status   09/04/2019 241 140 - 440 thou/uL Final      Plan: CBC PRN. Monitor for s/sx of bleeding.           Electronically signed by:  KARTHIK Walter CNP           Sincerely,        KARTHIK Walter CNP

## 2019-10-09 NOTE — LETTER
[facility]   To:             Please give to facility    From:   Elizabeth Simmons RN  Care Coordinator   [facility]   P: [phone]  [email or web address]   Patient Name:  Daya Ignacio YOB: 1930   Admit date: 08/30/2019      *Information Needed:  Please contact me when the patient will discharge (or if they will move to long term care)- include the discharge date, disposition, and main diagnosis   - If the patient is discharged with home care services, please provide the name of the agency    Also- Please inform me if a care conference is being held.   Phone, Fax or Email with information                   Thank you

## 2019-10-15 ENCOUNTER — NURSING HOME VISIT (OUTPATIENT)
Dept: GERIATRICS | Facility: CLINIC | Age: 84
End: 2019-10-15
Payer: MEDICARE

## 2019-10-15 VITALS
BODY MASS INDEX: 26.42 KG/M2 | RESPIRATION RATE: 16 BRPM | DIASTOLIC BLOOD PRESSURE: 46 MMHG | SYSTOLIC BLOOD PRESSURE: 106 MMHG | HEART RATE: 62 BPM | OXYGEN SATURATION: 94 % | WEIGHT: 134.6 LBS | HEIGHT: 60 IN | TEMPERATURE: 97.9 F

## 2019-10-15 DIAGNOSIS — S82.201A TIBIA/FIBULA FRACTURE, RIGHT, CLOSED, INITIAL ENCOUNTER: ICD-10-CM

## 2019-10-15 DIAGNOSIS — R53.81 PHYSICAL DECONDITIONING: ICD-10-CM

## 2019-10-15 DIAGNOSIS — Z87.81 S/P ORIF (OPEN REDUCTION INTERNAL FIXATION) FRACTURE: ICD-10-CM

## 2019-10-15 DIAGNOSIS — I10 ESSENTIAL HYPERTENSION: ICD-10-CM

## 2019-10-15 DIAGNOSIS — Z98.890 S/P ORIF (OPEN REDUCTION INTERNAL FIXATION) FRACTURE: ICD-10-CM

## 2019-10-15 DIAGNOSIS — S82.831D CLOSED FRACTURE OF PROXIMAL END OF RIGHT TIBIA AND FIBULA WITH ROUTINE HEALING, SUBSEQUENT ENCOUNTER: Primary | ICD-10-CM

## 2019-10-15 DIAGNOSIS — R35.0 URINARY FREQUENCY: ICD-10-CM

## 2019-10-15 DIAGNOSIS — Z87.19 HISTORY OF GI BLEED: ICD-10-CM

## 2019-10-15 DIAGNOSIS — S82.401A TIBIA/FIBULA FRACTURE, RIGHT, CLOSED, INITIAL ENCOUNTER: ICD-10-CM

## 2019-10-15 DIAGNOSIS — R41.89 COGNITIVE IMPAIRMENT: ICD-10-CM

## 2019-10-15 DIAGNOSIS — S82.101D CLOSED FRACTURE OF PROXIMAL END OF RIGHT TIBIA AND FIBULA WITH ROUTINE HEALING, SUBSEQUENT ENCOUNTER: Primary | ICD-10-CM

## 2019-10-15 DIAGNOSIS — D64.9 ANEMIA, UNSPECIFIED TYPE: ICD-10-CM

## 2019-10-15 DIAGNOSIS — Z86.19 HX OF CLOSTRIDIUM DIFFICILE INFECTION: ICD-10-CM

## 2019-10-15 PROCEDURE — 99309 SBSQ NF CARE MODERATE MDM 30: CPT | Performed by: NURSE PRACTITIONER

## 2019-10-15 ASSESSMENT — MIFFLIN-ST. JEOR: SCORE: 957.04

## 2019-10-15 NOTE — LETTER
10/15/2019        RE: Daya Ignacio  [address]  [place] [address]        [facility]  [facility] Record Number:  [id number]  Place of Service where encounter took place:  [facility] ([facility]) [[id number]]  Chief Complaint   Patient presents with     RECHECK       HPI:    Daya Ignacio  is a 89 year old (10/11/1930), who is being seen today for an episodic care visit.  HPI information obtained from: facility chart records, facility staff, patient report and [facility] chart review.     Brief Summary of Hospital/ TCU Course: Ms. Daya Ignacio is an 88 year old female with PMH of HTN, lumbar compression fractures, upper GI bleed, generalized anxiety disorder, recurrent ESBL UTI and c.diff infection. Patient returned to ED with fall after discharge from the hospital 12 hours earlier after being admitted for UTI for 6 days. Per hospital notes she was walking in her kitchen and became unwell, fell and instantly had pain in her right leg. S/p ORIF to right proximal tibia fracture on  8/28/19. Is NWB to RLE- should wear knee immobilizer at all times except for with bathing and ice. Developed leukocytosis, but suspect secondary to trauma. With multiple recurrent hospitalizations this past year for ESBL UTI and c.difficile. Completed c.diff taper 9/3. Discharged to TCU.               At TCU Ms. Ignacio was found to UTI from Klebsiella pneumoniae. Treated with 7 days of cipro (completed 9/21). While on antibiotics she received oral vanco prophy.     Today's concern is:  Ms. Ignacio was seen today for routine follow up. Overnight nurse had concerns that patient was asking for bedpan frequently. Patient has history of frequent UTI. She tells me that she doesn't feel like she is using bedpan more often than normal. She denies dysuria. Remains afebrile. Mentation is at baseline (with recent UTI she developed confusion). Nursing also feels her mentation is at  baseline. Bowels are moving daily. Reports overall pain is improving. Is using oxycodone 3-4 times per week and it is helpful. Has brace to knee in place and wrapped with ACE bandage. Continues to have variable BPs, with very significant drops after medications most recently: 158/72 to 106/46; 180/77 to 138/72; 144/75 to 101/58. HR 60s-80s. She denies dizziness/lightheadedness, CP, SOB.     Wt Readings from Last 4 Encounters:   10/15/19 61.1 kg (134 lb 9.6 oz)   10/09/19 60.3 kg (133 lb)   09/26/19 58.2 kg (128 lb 6.4 oz)   09/23/19 58.2 kg (128 lb 6.4 oz)       Past Medical and Surgical History reviewed in Epic today.    MEDICATIONS:  Current Outpatient Medications   Medication Sig Dispense Refill     acetaminophen (TYLENOL) 500 MG tablet Take 2 tablets (1,000 mg) by mouth 3 times daily       amLODIPine (NORVASC) 10 MG tablet Take 1 tablet (10 mg) by mouth At Bedtime 90 tablet 1     aspirin (ASA) 325 MG EC tablet Take 1 tablet (325 mg) by mouth daily 30 tablet 0     Calcium Carbonate (CALCIUM 600 PO) Take 1 tablet by mouth 2 times daily        carvedilol (COREG) 25 MG tablet Take 0.5 tablets (12.5 mg) by mouth 2 times daily (with meals)       Cholecalciferol (VITAMIN D3) 2000 UNITS CAPS Take 2,000 Units by mouth daily (with dinner)       cloNIDine (CATAPRES) 0.1 MG tablet TAKE ONE TABLET BY MOUTH TWO TIMES A  tablet 0     docusate sodium (COLACE) 100 MG capsule Take 1 capsule (100 mg) by mouth At Bedtime       estradiol (ESTRACE) 0.1 MG/GM vaginal cream Place 2 g vaginally three times a week paraben-free 6 g 3     gabapentin (NEURONTIN) 100 MG capsule Take 2 capsules (200 mg) by mouth 2 times daily 120 capsule 0     isosorbide dinitrate (ISORDIL) 10 MG tablet Take 1 tablet (10 mg) by mouth 3 times daily 270 tablet 3     Lidocaine (LIDOCARE) 4 % Patch Apply to low back topically one time a day for pain and remove per schedule as needed.       Multiple Vitamins-Minerals (PRESERVISION AREDS) CAPS Take 1 capsule  by mouth 2 times daily        omeprazole (PRILOSEC) 10 MG DR capsule Take 20 mg by mouth daily       ondansetron (ZOFRAN-ODT) 4 MG ODT tab Take 1 tablet (4 mg) by mouth every 6 hours as needed for nausea or vomiting       oxyCODONE (ROXICODONE) 5 MG tablet Take 0.5 tablets (2.5 mg) by mouth every 4 hours as needed for severe pain 15 tablet 0     polyethylene glycol (MIRALAX/GLYCOLAX) packet Take 17 g by mouth daily as needed for constipation       senna-docusate (SENOKOT-S/PERICOLACE) 8.6-50 MG tablet Take 2 tablets by mouth 2 times daily as needed for constipation       sertraline (ZOLOFT) 100 MG tablet Take 1 tablet (100 mg) by mouth daily 90 tablet 1     vitamin C (ASCORBIC ACID) 500 MG tablet Take 500 mg by mouth daily       REVIEW OF SYSTEMS:  5 point ROS of systems including Respiratory, Cardiovascular, Gastroenterology, Genitourinary, Musculoskeletal were all negative except for pertinent positives noted in my HPI.    Objective:  /46   Pulse 62   Temp 97.9  F (36.6  C)   Resp 16   Ht 1.524 m (5')   Wt 61.1 kg (134 lb 9.6 oz)   SpO2 94%   BMI 26.29 kg/m     Exam:  GENERAL APPEARANCE:  Alert, pleasant and cooperative, elderly female sitting in wheelchair on exam today  HEENT: normocephalic, moist mucous membranes, nose without drainage or crusting, hearing acuity: intact  RESP:  respiratory effort normal, no respiratory distress, Lung sounds clear, patient is on RA  CV: auscultation of heart done, rate and rhythm regular. no murmur, no rub or gallop. No peripheral edema,   ABDOMEN:  + bowel sounds, soft, nontender, no grimacing or guarding with palpation.  M/S:   Gait and station abnormal: TTWB to RLE; able to wiggle toes well- has knee brace in over right knee/leg  PSYCH: affect and mood normal    Labs:   Labs done in SNF are in [place] EPIC. Please refer to them using EPIC/Care Everywhere. and Recent labs in EPIC reviewed by me today.     ASSESSMENT/PLAN:  (K52.101D,  S82.923P) Closed fracture  of proximal end of right tibia and fibula with routine healing, subsequent encounter    (Z96.7, Z87.81) S/p ORIF  Comment: Acute, reports pain overall is improving- is using oxycodone about 4 times per week-is rating pain 7-8/10 at these times- now able to start TTWB per ortho- not at goal  -S/p ORIF to right proximal tibia fracture on 8/28/19.   Plan: Continue scheduled tylenol TID. Continue oxycodone PRN- to continue for shortest duration as possible. ASA for DVT prophy. Ok  for TTWB and transition to WBAT over next 6 weeks. Ok to remove brace at rest and at night, may unlock brace for work with PT. Follow up with ortho as scheduled.     (I10) Essential hypertension  Comment: Acute on chronic, BP variable control  with some substantial drops in BP after taking medications  -Metoprolol XL changed to 50 mg BID per discussion with geriatric pharmacist  Plan: BP goals are ~130 -165/60 -90 mmHg.This is higher than ACC and AHA recommendations due to goals of care, risk for hypotension, risk of dizziness and falls and frailty. Also has been having significant drops in BP after receiving medications. No changes to medication today- will continue to have nursing chart BP after receiving medications-discussed plan with nursing again today. Continue amlodipine qhs. Continue metoprolol XL 50 mg BID. Continue PTA imdur, clonidine. VS per TCU policy.       (D64.9) Anemia, unspecified type  Comment: Acute on chronic, hgb at baseline at last check-suspected to be due to recent surgery  Hemoglobin   Date Value Ref Range Status   09/17/2019 9.4 (A) 12.0 - 16.0 g/dL Final   09/09/2019 8.3 (A) 12.0 - 16.0 g/dL Final     -baseline 8-10  Plan: Hgb PRN. Monitor for s/sx of bleeding.     (Z87.19) History of GI bleed  Comment: With possible GI bleed in 6/2019-no blood in stool or dark stool documented  Plan: Continue PTA omeprazole. Hold fish oil while on ASA at TCU. Monitor     (R41.89) Cognitive impairment  Comment: Acute on  "chronic  -CPT  4.7/5.6 indicating per therapy notes that,  \"cognitively  may live alone with daily assist to monitor safety and solve new problems occurring through minor changes in environment (would benefit from assist higher level IADLs such as med management/finances.\"  --SLUMS 22/30  Plan: Continue to work with SLP. Nursing to provide supportive cares.      (R53.81) Physical deconditioning  Comment: Acute, secondary to recent hospitalization, medical conditions as above- progress with therapies slow- is able to TTWB with advancement to WBAT over next 5 weeks  Plan: Encourage participation in physical therapy/occupational therapy for strengthening and deconditioning. Discharge planning per their recommendation. Social work to assist with d/c planning.     (R35.0) Urinary frequency  Comment: Acute- appears to be chronic. No dysuria, fever or other symptoms of UTI and is not retaining urine (PVRs checked while at TCU)  - completed cipro 9/21 for UTI with UC + >100,000 copies of Klebsiella pneumoniae.   Plan: Encourage hydration. Continue vaginal estradiol 3 times per week. Monitor     (Z86.19) Hx of Clostridium difficile infection  Comment: no reports of loose stools  -completed vanco taper 9/3  -completed 10 day course of prophy oral vanco on 9/25 while on antibiotics for UTI  Plan: Monitor for reoccurrence.      Other chronic issues reviewed:  (F41.1) Generalized anxiety disorder  Comment: chronic  Plan: Continue PTA sertraline. Monitor      (Z87.81) History of compression fracture of spine  Comment: Chronic  Plan: Continue PTA lidocaine patch, gabapentin for pain. Monitor.      (D69.6) Thrombocytopenia (H)  Comment: Chronic, above baseline at last check   -Baseline variable 90s-200s  Platelet Count   Date Value Ref Range Status   09/04/2019 241 140 - 440 thou/uL Final     Plan: CBC PRN. Monitor for s/sx of bleeding.        Electronically signed by:  KARTHIK Walter CNP           Sincerely,        Seema " KARTHIK Guthrie CNP

## 2019-10-15 NOTE — PROGRESS NOTES
[facility]  [facility] Record Number:  [id number]  Place of Service where encounter took place:  [facility] ([facility]) [[id number]]  Chief Complaint   Patient presents with     RECHECK       HPI:    Daya Ignacio  is a 89 year old (10/11/1930), who is being seen today for an episodic care visit.  HPI information obtained from: facility chart records, facility staff, patient report and [facility] chart review.     Brief Summary of Hospital/ TCU Course: Ms. Daya Ignacio is an 88 year old female with PMH of HTN, lumbar compression fractures, upper GI bleed, generalized anxiety disorder, recurrent ESBL UTI and c.diff infection. Patient returned to ED with fall after discharge from the hospital 12 hours earlier after being admitted for UTI for 6 days. Per hospital notes she was walking in her kitchen and became unwell, fell and instantly had pain in her right leg. S/p ORIF to right proximal tibia fracture on  8/28/19. Is NWB to RLE- should wear knee immobilizer at all times except for with bathing and ice. Developed leukocytosis, but suspect secondary to trauma. With multiple recurrent hospitalizations this past year for ESBL UTI and c.difficile. Completed c.diff taper 9/3. Discharged to TCU.               At TCU Ms. Ignacio was found to UTI from Klebsiella pneumoniae. Treated with 7 days of cipro (completed 9/21). While on antibiotics she received oral vanco prophy.     Today's concern is:  Ms. Ignacio was seen today for routine follow up. Overnight nurse had concerns that patient was asking for bedpan frequently. Patient has history of frequent UTI. She tells me that she doesn't feel like she is using bedpan more often than normal. She denies dysuria. Remains afebrile. Mentation is at baseline (with recent UTI she developed confusion). Nursing also feels her mentation is at baseline. Bowels are moving daily. Reports overall pain is improving. Is using oxycodone 3-4  times per week and it is helpful. Nursing did report since she has been putting more weight on her leg that she has had more pain. Has brace to knee in place and wrapped with ACE bandage. Continues to have variable BPs, with very significant drops after medications most recently: 158/72 to 106/46; 180/77 to 138/72; 144/75 to 101/58. HR 60s-80s. She denies dizziness/lightheadedness, CP, SOB.     Wt Readings from Last 4 Encounters:   10/15/19 61.1 kg (134 lb 9.6 oz)   10/09/19 60.3 kg (133 lb)   09/26/19 58.2 kg (128 lb 6.4 oz)   09/23/19 58.2 kg (128 lb 6.4 oz)       Past Medical and Surgical History reviewed in Epic today.    MEDICATIONS:  Current Outpatient Medications   Medication Sig Dispense Refill     acetaminophen (TYLENOL) 500 MG tablet Take 2 tablets (1,000 mg) by mouth 3 times daily       amLODIPine (NORVASC) 10 MG tablet Take 1 tablet (10 mg) by mouth At Bedtime 90 tablet 1     aspirin (ASA) 325 MG EC tablet Take 1 tablet (325 mg) by mouth daily 30 tablet 0     Calcium Carbonate (CALCIUM 600 PO) Take 1 tablet by mouth 2 times daily        carvedilol (COREG) 25 MG tablet Take 0.5 tablets (12.5 mg) by mouth 2 times daily (with meals)       Cholecalciferol (VITAMIN D3) 2000 UNITS CAPS Take 2,000 Units by mouth daily (with dinner)       cloNIDine (CATAPRES) 0.1 MG tablet TAKE ONE TABLET BY MOUTH TWO TIMES A  tablet 0     docusate sodium (COLACE) 100 MG capsule Take 1 capsule (100 mg) by mouth At Bedtime       estradiol (ESTRACE) 0.1 MG/GM vaginal cream Place 2 g vaginally three times a week paraben-free 6 g 3     gabapentin (NEURONTIN) 100 MG capsule Take 2 capsules (200 mg) by mouth 2 times daily 120 capsule 0     isosorbide dinitrate (ISORDIL) 10 MG tablet Take 1 tablet (10 mg) by mouth 3 times daily 270 tablet 3     Lidocaine (LIDOCARE) 4 % Patch Apply to low back topically one time a day for pain and remove per schedule as needed.       Multiple Vitamins-Minerals (PRESERVISION AREDS) CAPS Take 1  capsule by mouth 2 times daily        omeprazole (PRILOSEC) 10 MG DR capsule Take 20 mg by mouth daily       ondansetron (ZOFRAN-ODT) 4 MG ODT tab Take 1 tablet (4 mg) by mouth every 6 hours as needed for nausea or vomiting       oxyCODONE (ROXICODONE) 5 MG tablet Take 0.5 tablets (2.5 mg) by mouth every 4 hours as needed for severe pain 15 tablet 0     polyethylene glycol (MIRALAX/GLYCOLAX) packet Take 17 g by mouth daily as needed for constipation       senna-docusate (SENOKOT-S/PERICOLACE) 8.6-50 MG tablet Take 2 tablets by mouth 2 times daily as needed for constipation       sertraline (ZOLOFT) 100 MG tablet Take 1 tablet (100 mg) by mouth daily 90 tablet 1     vitamin C (ASCORBIC ACID) 500 MG tablet Take 500 mg by mouth daily       REVIEW OF SYSTEMS:  5 point ROS of systems including Respiratory, Cardiovascular, Gastroenterology, Genitourinary, Musculoskeletal were all negative except for pertinent positives noted in my HPI.    Objective:  /46   Pulse 62   Temp 97.9  F (36.6  C)   Resp 16   Ht 1.524 m (5')   Wt 61.1 kg (134 lb 9.6 oz)   SpO2 94%   BMI 26.29 kg/m    Exam:  GENERAL APPEARANCE:  Alert, pleasant and cooperative, elderly female sitting in wheelchair on exam today  HEENT: normocephalic, moist mucous membranes, nose without drainage or crusting, hearing acuity: intact  RESP:  respiratory effort normal, no respiratory distress, Lung sounds clear, patient is on RA  CV: auscultation of heart done, rate and rhythm regular. no murmur, no rub or gallop. No peripheral edema,   ABDOMEN:  + bowel sounds, soft, nontender, no grimacing or guarding with palpation.  M/S:   Gait and station abnormal: TTWB to RLE; able to wiggle toes well- has knee brace in over right knee/leg  PSYCH: affect and mood normal    Labs:   Labs done in SNF are in [place] EPIC. Please refer to them using EPIC/Care Everywhere. and Recent labs in EPIC reviewed by me today.     ASSESSMENT/PLAN:  (B57.493V,  Y18.949A) Closed  fracture of proximal end of right tibia and fibula with routine healing, subsequent encounter    (Z96.7, Z87.81) S/p ORIF  Comment: Acute, reports pain overall is improving, however nursing has noted that with putting more weight on her leg now she is having some increased pain- is using oxycodone about 4 times per week-is rating pain 7-8/10 at these times- now able to start TTWB per ortho- not at goal  -S/p ORIF to right proximal tibia fracture on 8/28/19.   Plan: Continue scheduled tylenol TID. Continue oxycodone PRN- to continue for shortest duration as possible. ASA for DVT prophy. Ok for TTWB and transition to WBAT over next 6 weeks. Ok to remove brace at rest and at night, may unlock brace for work with PT. Follow up with ortho as scheduled.     (I10) Essential hypertension  Comment: Acute on chronic, BP variable control with some substantial drops in BP after taking medications  -Metoprolol XL changed to 50 mg BID per discussion with geriatric pharmacist  Plan: BP goals are ~130 -165/60 -90 mmHg.This is higher than ACC and AHA recommendations due to goals of care, risk for hypotension, risk of dizziness and falls and frailty. Also has been having significant drops in BP after receiving medications. No changes to medication today- will continue to have nursing chart BP after receiving medications-discussed plan with nursing again today. Continue amlodipine qhs. Continue metoprolol XL 50 mg BID. Continue PTA imdur, clonidine. VS per TCU policy.       (D64.9) Anemia, unspecified type  Comment: Acute on chronic, hgb at baseline at last check-suspected to be due to recent surgery  Hemoglobin   Date Value Ref Range Status   09/17/2019 9.4 (A) 12.0 - 16.0 g/dL Final   09/09/2019 8.3 (A) 12.0 - 16.0 g/dL Final     -baseline 8-10  Plan: Hgb PRN. Monitor for s/sx of bleeding.     (Z87.19) History of GI bleed  Comment: With possible GI bleed in 6/2019-no blood in stool or dark stool documented  Plan: Continue PTA  "omeprazole. Hold fish oil while on ASA at TCU. Monitor     (R41.89) Cognitive impairment  Comment: Acute on chronic  -CPT  4.7/5.6 indicating per therapy notes that,  \"cognitively  may live alone with daily assist to monitor safety and solve new problems occurring through minor changes in environment (would benefit from assist higher level IADLs such as med management/finances.\"  --SLUMS 22/30  Plan: Continue to work with SLP. Nursing to provide supportive cares.      (R53.81) Physical deconditioning  Comment: Acute, secondary to recent hospitalization, medical conditions as above- progress with therapies slow- is able to TTWB with advancement to WBAT over next 5 weeks  Plan: Encourage participation in physical therapy/occupational therapy for strengthening and deconditioning. Discharge planning per their recommendation. Social work to assist with d/c planning.     (R35.0) Urinary frequency  Comment: Acute- appears to be chronic. No dysuria, fever or other symptoms of UTI and is not retaining urine (PVRs checked while at TCU)  - completed cipro 9/21 for UTI with UC + >100,000 copies of Klebsiella pneumoniae.   Plan: Encourage hydration. Continue vaginal estradiol 3 times per week. Monitor     (Z86.19) Hx of Clostridium difficile infection  Comment: no reports of loose stools  -completed vanco taper 9/3  -completed 10 day course of prophy oral vanco on 9/25 while on antibiotics for UTI  Plan: Monitor for reoccurrence.      Other chronic issues reviewed:  (F41.1) Generalized anxiety disorder  Comment: chronic  Plan: Continue PTA sertraline. Monitor      (Z87.81) History of compression fracture of spine  Comment: Chronic  Plan: Continue PTA lidocaine patch, gabapentin for pain. Monitor.      (D69.6) Thrombocytopenia (H)  Comment: Chronic, above baseline at last check   -Baseline variable 90s-200s  Platelet Count   Date Value Ref Range Status   09/04/2019 241 140 - 440 thou/uL Final     Plan: CBC PRN. Monitor for s/sx " of bleeding.        Electronically signed by:  KARTHIK Walter CNP

## 2019-10-16 RX ORDER — OXYCODONE HYDROCHLORIDE 5 MG/1
2.5 TABLET ORAL EVERY 4 HOURS PRN
Qty: 15 TABLET | Refills: 0 | Status: SHIPPED | OUTPATIENT
Start: 2019-10-16 | End: 2019-11-07

## 2019-10-24 ENCOUNTER — NURSING HOME VISIT (OUTPATIENT)
Dept: GERIATRICS | Facility: CLINIC | Age: 84
End: 2019-10-24
Payer: MEDICARE

## 2019-10-24 ENCOUNTER — RECORDS - HEALTHEAST (OUTPATIENT)
Dept: LAB | Facility: CLINIC | Age: 84
End: 2019-10-24

## 2019-10-24 VITALS
SYSTOLIC BLOOD PRESSURE: 177 MMHG | DIASTOLIC BLOOD PRESSURE: 75 MMHG | HEIGHT: 60 IN | TEMPERATURE: 97.8 F | HEART RATE: 65 BPM | BODY MASS INDEX: 26.42 KG/M2 | OXYGEN SATURATION: 95 % | RESPIRATION RATE: 18 BRPM | WEIGHT: 134.6 LBS

## 2019-10-24 DIAGNOSIS — Z87.81 S/P ORIF (OPEN REDUCTION INTERNAL FIXATION) FRACTURE: ICD-10-CM

## 2019-10-24 DIAGNOSIS — K59.00 CONSTIPATION, UNSPECIFIED CONSTIPATION TYPE: ICD-10-CM

## 2019-10-24 DIAGNOSIS — Z98.890 S/P ORIF (OPEN REDUCTION INTERNAL FIXATION) FRACTURE: ICD-10-CM

## 2019-10-24 DIAGNOSIS — R53.81 PHYSICAL DECONDITIONING: ICD-10-CM

## 2019-10-24 DIAGNOSIS — D64.9 ANEMIA, UNSPECIFIED TYPE: ICD-10-CM

## 2019-10-24 DIAGNOSIS — R41.89 COGNITIVE IMPAIRMENT: ICD-10-CM

## 2019-10-24 DIAGNOSIS — S82.831D CLOSED FRACTURE OF PROXIMAL END OF RIGHT TIBIA AND FIBULA WITH ROUTINE HEALING, SUBSEQUENT ENCOUNTER: Primary | ICD-10-CM

## 2019-10-24 DIAGNOSIS — S82.101D CLOSED FRACTURE OF PROXIMAL END OF RIGHT TIBIA AND FIBULA WITH ROUTINE HEALING, SUBSEQUENT ENCOUNTER: Primary | ICD-10-CM

## 2019-10-24 DIAGNOSIS — R35.0 URINARY FREQUENCY: ICD-10-CM

## 2019-10-24 DIAGNOSIS — I10 ESSENTIAL HYPERTENSION: ICD-10-CM

## 2019-10-24 DIAGNOSIS — Z87.19 HISTORY OF GI BLEED: ICD-10-CM

## 2019-10-24 DIAGNOSIS — Z86.19 HX OF CLOSTRIDIUM DIFFICILE INFECTION: ICD-10-CM

## 2019-10-24 PROCEDURE — 99309 SBSQ NF CARE MODERATE MDM 30: CPT | Performed by: NURSE PRACTITIONER

## 2019-10-24 ASSESSMENT — MIFFLIN-ST. JEOR: SCORE: 957.04

## 2019-10-24 NOTE — PROGRESS NOTES
"[place] [facility]  [place] Medical Record Number:  [id number]  Place of Service where encounter took place:  [facility] ([facility]) [[id number]]  Chief Complaint   Patient presents with     RECHECK       HPI:    Daya Ignacio  is a 89 year old (10/11/1930), who is being seen today for an episodic care visit.  HPI information obtained from: facility chart records, facility staff, patient report and [facility] chart review.     Brief Summary of Hospital/ TCU Course: Ms. Daya Ignacio is an 88 year old female with PMH of HTN, lumbar compression fractures, upper GI bleed, generalized anxiety disorder, recurrent ESBL UTI and c.diff infection. Patient returned to ED with fall after discharge from the hospital 12 hours earlier after being admitted for UTI for 6 days. Per hospital notes she was walking in her kitchen and became unwell, fell and instantly had pain in her right leg. S/p ORIF to right proximal tibia fracture on  8/28/19. Is NWB to RLE- should wear knee immobilizer at all times except for with bathing and ice. Developed leukocytosis, but suspect secondary to trauma. With multiple recurrent hospitalizations this past year for ESBL UTI and c.difficile. Completed c.diff taper 9/3. Discharged to TCU.               At TCU Ms. Ignacio was found to UTI from Klebsiella pneumoniae. Treated with 7 days of cipro (completed 9/21). While on antibiotics she received oral vanco prophy.     Today's concern is:  Ms. Ignacio was seen today for routine follow up at TCU. Overall she tells me she is doing ok. She has had increased pain to right knee now that she is able to bear weight. Has used 5 doses of oxycodone total over past 7 days. Is interested in trying a \"cream\" for her knee that her tablemate at breakfast told her about. Upper knee is tender on palpation. Has brace on knee. Denies SOB, CP, dizziness/lightheadedness, edema. Continues to have significant BP changes after medication " administration. 187/72 to 113/57 after meds documented per nursing in past week. She did require 1 extra dose of clonidine per on call this past week due to /89. Denies urinary concerns, dysuria today. Weight down 6 lb since admission. Per EMR bowels moving irregularly every 2-3 days minimally.      Past Medical and Surgical History reviewed in Epic today.    MEDICATIONS:  Current Outpatient Medications   Medication Sig Dispense Refill     acetaminophen (TYLENOL) 500 MG tablet Take 2 tablets (1,000 mg) by mouth 3 times daily       amLODIPine (NORVASC) 10 MG tablet Take 0.5 tablets (5 mg) by mouth 2 times daily 90 tablet 1     aspirin (ASA) 325 MG EC tablet Take 1 tablet (325 mg) by mouth daily 30 tablet 0     Calcium Carbonate (CALCIUM 600 PO) Take 1 tablet by mouth 2 times daily        carvedilol (COREG) 25 MG tablet Take 0.5 tablets (12.5 mg) by mouth 2 times daily (with meals)       Cholecalciferol (VITAMIN D3) 2000 UNITS CAPS Take 2,000 Units by mouth daily (with dinner)       cloNIDine (CATAPRES) 0.1 MG tablet TAKE ONE TABLET BY MOUTH TWO TIMES A  tablet 0     docusate sodium (COLACE) 100 MG capsule Take 1 capsule (100 mg) by mouth At Bedtime       estradiol (ESTRACE) 0.1 MG/GM vaginal cream Place 2 g vaginally three times a week paraben-free 6 g 3     gabapentin (NEURONTIN) 100 MG capsule Take 2 capsules (200 mg) by mouth 2 times daily 120 capsule 0     isosorbide dinitrate (ISORDIL) 10 MG tablet Take 1 tablet (10 mg) by mouth 3 times daily 270 tablet 3     Lidocaine (LIDOCARE) 4 % Patch Apply to low back topically one time a day for pain and remove per schedule as needed.       Multiple Vitamins-Minerals (PRESERVISION AREDS) CAPS Take 1 capsule by mouth 2 times daily        omeprazole (PRILOSEC) 10 MG DR capsule Take 20 mg by mouth daily       ondansetron (ZOFRAN-ODT) 4 MG ODT tab Take 1 tablet (4 mg) by mouth every 6 hours as needed for nausea or vomiting       oxyCODONE (ROXICODONE) 5 MG tablet  Take 0.5 tablets (2.5 mg) by mouth every 4 hours as needed for severe pain 15 tablet 0     polyethylene glycol (MIRALAX/GLYCOLAX) packet Take 17 g by mouth daily as needed for constipation       senna-docusate (SENOKOT-S/PERICOLACE) 8.6-50 MG tablet Take 2 tablets by mouth 2 times daily as needed for constipation       SENNA-docusate sodium (SENNA S) 8.6-50 MG tablet Take 1 tablet by mouth At Bedtime       sertraline (ZOLOFT) 100 MG tablet Take 1 tablet (100 mg) by mouth daily 90 tablet 1     vitamin C (ASCORBIC ACID) 500 MG tablet Take 500 mg by mouth daily         REVIEW OF SYSTEMS:  5 point ROS of systems including Respiratory, Cardiovascular, Gastroenterology, Genitourinary, Musculoskeletal were all negative except for pertinent positives noted in my HPI.    Objective:  BP (!) 177/75   Pulse 65   Temp 97.8  F (36.6  C)   Resp 18   Ht 1.524 m (5')   Wt 61.1 kg (134 lb 9.6 oz)   SpO2 95%   BMI 26.29 kg/m    Exam:  GENERAL APPEARANCE:  Alert, pleasant and cooperative, elderly female sitting in wheelchair on exam today  HEENT: normocephalic, moist mucous membranes, nose without drainage or crusting, hearing acuity: intact  RESP:  respiratory effort normal, no respiratory distress, Lung sounds clear, patient is on RA  CV: auscultation of heart done, rate and rhythm regular. no murmur, no rub or gallop. No peripheral edema,   ABDOMEN:  + bowel sounds, soft, nontender, no grimacing or guarding with palpation.  M/S:   Gait and station- now able to WBAT; able to  has knee brace in over right knee/leg, with some tenderness on palpation to top of knee  PSYCH: affect and mood normal    Labs:   Labs done in SNF are in [place] EPIC. Please refer to them using [employer]/Care Everywhere. and Recent labs in EPIC reviewed by me today.     ASSESSMENT/PLAN:  (S82.101D,  S82.831D) Closed fracture of proximal end of right tibia and fibula with routine healing, subsequent encounter    (Z96.7, Z87.81) S/p ORIF  Comment: Acute,  "reports ongoing pain that is worse than it had been now that she is bearing more weight on it- not at goal  -S/p ORIF to right proximal tibia fracture on 8/28/19.   Plan: Continue scheduled tylenol TID. Continue oxycodone PRN- to continue for shortest duration as possible. Start voltaren gel TID. ASA for DVT prophy. Ok for TTWB and transition to WBAT over next 6 weeks. Ok to remove brace at rest and at night, may unlock brace for work with PT. Follow up with ortho as scheduled.     (I10) Essential hypertension  Comment: Acute on chronic, BP variable control with some substantial drops in BP after taking medications  -Metoprolol XL changed to 50 mg BID per discussion with geriatric pharmacist  Plan: BP goals are ~130 -165/60 -90 mmHg.This is higher than ACC and AHA recommendations due to goals of care, risk for hypotension, risk of dizziness and falls and frailty. Also has been having significant drops in BP after receiving medications. Change amlodipine to 5 mg BID. Continue metoprolol XL 50 mg BID. Continue PTA imdur, clonidine. VS per TCU policy.       (D64.9) Anemia, unspecified type  Comment: Acute on chronic, hgb at baseline at last check-suspected to be due to recent surgery  Hemoglobin   Date Value Ref Range Status   09/17/2019 9.4 (A) 12.0 - 16.0 g/dL Final   09/09/2019 8.3 (A) 12.0 - 16.0 g/dL Final       -baseline 8-10  Plan: Hgb 10/25. Monitor for s/sx of bleeding.     (Z87.19) History of GI bleed  Comment: With possible GI bleed in 6/2019-no blood in stool or dark stool documented  Plan: Continue PTA omeprazole. Hold fish oil while on ASA at TCU. Monitor     (R41.89) Cognitive impairment  Comment: Acute on chronic  -CPT  4.7/5.6 indicating per therapy notes that,  \"cognitively  may live alone with daily assist to monitor safety and solve new problems occurring through minor changes in environment (would benefit from assist higher level IADLs such as med management/finances.\"  --SLUMS 22/30  Plan: " Continue to work with SLP. Nursing to provide supportive cares.      (R53.81) Physical deconditioning  Comment: Acute, secondary to recent hospitalization, medical conditions as above- progress with therapies slow- is able to TTWB with advancement to WBAT over next 5 weeks  Plan: Encourage participation in physical therapy/occupational therapy for strengthening and deconditioning. Discharge planning per their recommendation. Social work to assist with d/c planning.     (R35.0) Urinary frequency  Comment: Acute- appears to be chronic. No dysuria, fever or other symptoms of UTI and is not retaining urine (PVRs checked while at TCU)  - completed cipro 9/21 for UTI with UC + >100,000 copies of Klebsiella pneumoniae.   Plan: Encourage hydration. Continue vaginal estradiol 3 times per week. Monitor     (Z86.19) Hx of Clostridium difficile infection  Comment: no reports of loose stools  -completed vanco taper 9/3  -completed 10 day course of prophy oral vanco on 9/25 while on antibiotics for UTI  Plan: Monitor for reoccurrence.      (K59.00) Constipation  Comment: acute, irregular bowel movements  Plan: Senna S 1 tab po at bedtime. Nursing to monitor bowels and use house orders PRN- notify provider if used to adjust daily regimen.      Other chronic issues reviewed:  (F41.1) Generalized anxiety disorder  Comment: chronic  Plan: Continue PTA sertraline. Monitor      (Z87.81) History of compression fracture of spine  Comment: Chronic  Plan: Continue PTA lidocaine patch, gabapentin for pain. Monitor.      (D69.6) Thrombocytopenia (H)  Comment: Chronic, above baseline at last check   -Baseline variable 90s-200s        Platelet Count   Date Value Ref Range Status   09/04/2019 241 140 - 440 thou/uL Final      Plan: CBC PRN. Monitor for s/sx of bleeding.           Electronically signed by:  KARTHIK Walter CNP

## 2019-10-25 LAB
ANION GAP SERPL CALCULATED.3IONS-SCNC: 6 MMOL/L (ref 5–18)
BUN SERPL-MCNC: 34 MG/DL (ref 8–28)
CALCIUM SERPL-MCNC: 9.1 MG/DL (ref 8.5–10.5)
CHLORIDE BLD-SCNC: 105 MMOL/L (ref 98–107)
CO2 SERPL-SCNC: 29 MMOL/L (ref 22–31)
CREAT SERPL-MCNC: 0.68 MG/DL (ref 0.6–1.1)
GFR SERPL CREATININE-BSD FRML MDRD: >60 ML/MIN/1.73M2
GLUCOSE BLD-MCNC: 91 MG/DL (ref 70–125)
HGB BLD-MCNC: 8.4 G/DL (ref 12–16)
POTASSIUM BLD-SCNC: 3.4 MMOL/L (ref 3.5–5)
SODIUM SERPL-SCNC: 140 MMOL/L (ref 136–145)

## 2019-10-25 RX ORDER — AMLODIPINE BESYLATE 10 MG/1
5 TABLET ORAL 2 TIMES DAILY
Qty: 90 TABLET | Refills: 1
Start: 2019-10-25 | End: 2019-10-25

## 2019-10-25 RX ORDER — AMLODIPINE BESYLATE 10 MG/1
5 TABLET ORAL 2 TIMES DAILY
Qty: 90 TABLET | Refills: 1
Start: 2019-10-25 | End: 2020-05-06

## 2019-10-25 RX ORDER — SENNA AND DOCUSATE SODIUM 50; 8.6 MG/1; MG/1
1 TABLET, FILM COATED ORAL AT BEDTIME
Status: ON HOLD
Start: 2019-10-25 | End: 2023-02-10

## 2019-10-26 ENCOUNTER — TRANSFERRED RECORDS (OUTPATIENT)
Dept: HEALTH INFORMATION MANAGEMENT | Facility: CLINIC | Age: 84
End: 2019-10-26

## 2019-10-26 ENCOUNTER — RECORDS - HEALTHEAST (OUTPATIENT)
Dept: LAB | Facility: CLINIC | Age: 84
End: 2019-10-26

## 2019-10-26 ENCOUNTER — TELEPHONE (OUTPATIENT)
Dept: GERIATRICS | Facility: CLINIC | Age: 84
End: 2019-10-26

## 2019-10-26 LAB
ALBUMIN UR-MCNC: NEGATIVE MG/DL
AMORPH CRY #/AREA URNS HPF: ABNORMAL /[HPF]
APPEARANCE UR: ABNORMAL
BACTERIA #/AREA URNS HPF: ABNORMAL HPF
BILIRUB UR QL STRIP: NEGATIVE
COLOR UR AUTO: ABNORMAL
GLUCOSE UR STRIP-MCNC: NEGATIVE MG/DL
HGB UR QL STRIP: NEGATIVE
KETONES UR STRIP-MCNC: NEGATIVE MG/DL
LEUKOCYTE ESTERASE UR QL STRIP: NEGATIVE
MUCOUS THREADS #/AREA URNS LPF: ABNORMAL LPF
NITRATE UR QL: NEGATIVE
PH UR STRIP: 8 [PH] (ref 4.5–8)
RBC #/AREA URNS AUTO: ABNORMAL HPF
SP GR UR STRIP: 1.01 (ref 1–1.03)
SQUAMOUS #/AREA URNS AUTO: ABNORMAL LPF
TRANS CELLS #/AREA URNS HPF: ABNORMAL LPF
UROBILINOGEN UR STRIP-ACNC: ABNORMAL
WBC #/AREA URNS AUTO: ABNORMAL HPF
YEAST #/AREA URNS HPF: ABNORMAL HPF

## 2019-10-26 NOTE — TELEPHONE ENCOUNTER
[place] [facility] TELEPHONE ENCOUNTER    Chief Complaint   Patient presents with     Urinary Problem       Daya Ignacio is a 89 year old  (10/11/1930),Nurse called today to report: patient with urinary frequency, urgency and discomfort.     ASSESSMENT/PLAN  Urinary symptoms ?UTI    UA/UC today    Helen Trujillo, KARTHIK CNP

## 2019-10-27 ENCOUNTER — RECORDS - HEALTHEAST (OUTPATIENT)
Dept: LAB | Facility: CLINIC | Age: 84
End: 2019-10-27

## 2019-10-28 ENCOUNTER — TRANSFERRED RECORDS (OUTPATIENT)
Dept: HEALTH INFORMATION MANAGEMENT | Facility: CLINIC | Age: 84
End: 2019-10-28

## 2019-10-28 LAB
ANION GAP SERPL CALCULATED.3IONS-SCNC: 6 MMOL/L (ref 5–18)
ANION GAP SERPL CALCULATED.3IONS-SCNC: 6 MMOL/L (ref 5–18)
BUN SERPL-MCNC: 18 MG/DL (ref 8–28)
BUN SERPL-MCNC: 18 MG/DL (ref 8–28)
CALCIUM SERPL-MCNC: 9.8 MG/DL (ref 8.5–10.5)
CALCIUM SERPL-MCNC: 9.8 MG/DL (ref 8.5–10.5)
CHLORIDE BLD-SCNC: 102 MMOL/L (ref 98–107)
CHLORIDE SERPLBLD-SCNC: 102 MMOL/L (ref 98–107)
CO2 SERPL-SCNC: 30 MMOL/L (ref 22–31)
CO2 SERPL-SCNC: 30 MMOL/L (ref 22–31)
CREAT SERPL-MCNC: 0.71 MG/DL (ref 0.6–1.1)
CREAT SERPL-MCNC: 0.71 MG/DL (ref 0.6–1.1)
GFR SERPL CREATININE-BSD FRML MDRD: >60 ML/MIN/1.73M2
GFR SERPL CREATININE-BSD FRML MDRD: >60 ML/MIN/1.73M2
GLUCOSE BLD-MCNC: 85 MG/DL (ref 70–125)
GLUCOSE SERPL-MCNC: 85 MG/DL (ref 70–125)
HGB BLD-MCNC: 9.9 G/DL (ref 12–16)
POTASSIUM BLD-SCNC: 3.6 MMOL/L (ref 3.5–5)
POTASSIUM SERPL-SCNC: 3.6 MMOL/L (ref 3.5–5)
SODIUM SERPL-SCNC: 138 MMOL/L (ref 136–145)
SODIUM SERPL-SCNC: 138 MMOL/L (ref 136–145)

## 2019-10-29 ENCOUNTER — NURSING HOME VISIT (OUTPATIENT)
Dept: GERIATRICS | Facility: CLINIC | Age: 84
End: 2019-10-29
Payer: MEDICARE

## 2019-10-29 VITALS
DIASTOLIC BLOOD PRESSURE: 80 MMHG | HEIGHT: 60 IN | HEART RATE: 69 BPM | SYSTOLIC BLOOD PRESSURE: 188 MMHG | OXYGEN SATURATION: 99 % | RESPIRATION RATE: 18 BRPM | BODY MASS INDEX: 24.19 KG/M2 | TEMPERATURE: 97.4 F | WEIGHT: 123.2 LBS

## 2019-10-29 DIAGNOSIS — R82.71 ASYMPTOMATIC BACTERIURIA: Primary | ICD-10-CM

## 2019-10-29 DIAGNOSIS — Z86.19 HX OF CLOSTRIDIUM DIFFICILE INFECTION: ICD-10-CM

## 2019-10-29 DIAGNOSIS — I10 ESSENTIAL HYPERTENSION: ICD-10-CM

## 2019-10-29 PROCEDURE — 99309 SBSQ NF CARE MODERATE MDM 30: CPT | Performed by: NURSE PRACTITIONER

## 2019-10-29 RX ORDER — CEFDINIR 300 MG/1
300 CAPSULE ORAL 2 TIMES DAILY
COMMUNITY
Start: 2019-10-28 | End: 2019-11-05

## 2019-10-29 ASSESSMENT — MIFFLIN-ST. JEOR: SCORE: 905.33

## 2019-10-29 NOTE — PROGRESS NOTES
[place] [facility]  [place] Medical Record Number:  [id number]  Place of Service where encounter took place:  [facility] ([facility]) [[id number]]  Chief Complaint   Patient presents with     RECHECK       HPI:    Daya Ignacio  is a 89 year old (10/11/1930), who is being seen today for an episodic care visit.  HPI information obtained from: facility chart records, facility staff, patient report and [facility] chart review.     Brief Summary of Hospital/ TCU Course: Ms. Daya Ignacio is an 88 year old female with PMH of HTN, lumbar compression fractures, upper GI bleed, generalized anxiety disorder, recurrent ESBL UTI and c.diff infection. Patient returned to ED with fall after discharge from the hospital 12 hours earlier after being admitted for UTI for 6 days. Per hospital notes she was walking in her kitchen and became unwell, fell and instantly had pain in her right leg. S/p ORIF to right proximal tibia fracture on  8/28/19. Is NWB to RLE- should wear knee immobilizer at all times except for with bathing and ice. Developed leukocytosis, but suspect secondary to trauma. With multiple recurrent hospitalizations this past year for ESBL UTI and c.difficile. Completed c.diff taper 9/3. Discharged to TCU.               At TCU Ms. Ignacio was found to UTI from Klebsiella pneumoniae. Treated with 7 days of cipro (completed 9/21). While on antibiotics she received oral vanco prophy.     Today's concern is:  Ms. Daya Ignacio was seen today after results were available for UA/UC collected over the weekend. Nursing reports that over the weekend she complained of dysuria. She has remained afebrile. Today she denies dysuria, increased frequency. Has no back pain or lower abdominal pain.    Past Medical and Surgical History reviewed in Epic today.    MEDICATIONS:  Current Outpatient Medications   Medication Sig Dispense Refill     acetaminophen (TYLENOL) 500 MG tablet Take 2 tablets (1,000  mg) by mouth 3 times daily       amLODIPine (NORVASC) 10 MG tablet Take 0.5 tablets (5 mg) by mouth 2 times daily 90 tablet 1     aspirin (ASA) 325 MG EC tablet Take 1 tablet (325 mg) by mouth daily 30 tablet 0     Calcium Carbonate (CALCIUM 600 PO) Take 1 tablet by mouth 2 times daily        carvedilol (COREG) 25 MG tablet Take 0.5 tablets (12.5 mg) by mouth 2 times daily (with meals)       cefdinir (OMNICEF) 300 MG capsule Take 300 mg by mouth 2 times daily       Cholecalciferol (VITAMIN D3) 2000 UNITS CAPS Take 2,000 Units by mouth daily (with dinner)       cloNIDine (CATAPRES) 0.1 MG tablet TAKE ONE TABLET BY MOUTH TWO TIMES A  tablet 0     docusate sodium (COLACE) 100 MG capsule Take 1 capsule (100 mg) by mouth At Bedtime       estradiol (ESTRACE) 0.1 MG/GM vaginal cream Place 2 g vaginally three times a week paraben-free 6 g 3     gabapentin (NEURONTIN) 100 MG capsule Take 2 capsules (200 mg) by mouth 2 times daily 120 capsule 0     isosorbide dinitrate (ISORDIL) 10 MG tablet Take 1 tablet (10 mg) by mouth 3 times daily 270 tablet 3     Lidocaine (LIDOCARE) 4 % Patch Apply to low back topically one time a day for pain and remove per schedule as needed.       Multiple Vitamins-Minerals (PRESERVISION AREDS) CAPS Take 1 capsule by mouth 2 times daily        omeprazole (PRILOSEC) 10 MG DR capsule Take 20 mg by mouth daily       ondansetron (ZOFRAN-ODT) 4 MG ODT tab Take 1 tablet (4 mg) by mouth every 6 hours as needed for nausea or vomiting       oxyCODONE (ROXICODONE) 5 MG tablet Take 0.5 tablets (2.5 mg) by mouth every 4 hours as needed for severe pain 15 tablet 0     polyethylene glycol (MIRALAX/GLYCOLAX) packet Take 17 g by mouth daily as needed for constipation       senna-docusate (SENOKOT-S/PERICOLACE) 8.6-50 MG tablet Take 2 tablets by mouth 2 times daily as needed for constipation       SENNA-docusate sodium (SENNA S) 8.6-50 MG tablet Take 1 tablet by mouth At Bedtime       sertraline (ZOLOFT) 100  MG tablet Take 1 tablet (100 mg) by mouth daily 90 tablet 1     vitamin C (ASCORBIC ACID) 500 MG tablet Take 500 mg by mouth daily       REVIEW OF SYSTEMS:  5 point ROS of systems including Constitutional, Respiratory, Cardiovascular, Gastroenterology, Genitourinary were all negative except for pertinent positives noted in my HPI.    Objective:  BP (!) 188/80   Pulse 69   Temp 97.4  F (36.3  C)   Resp 18   Ht 1.524 m (5')   Wt 55.9 kg (123 lb 3.2 oz)   SpO2 99%   BMI 24.06 kg/m    Exam:  GENERAL APPEARANCE:  Alert, pleasant and cooperative, elderly female sitting in chair on exam today  HEENT: normocephalic, moist mucous membranes, nose without drainage or crusting, hearing acuity: intact  RESP:  respiratory effort normal, no respiratory distress  CV: No peripheral edema,   ABDOMEN:  soft, nontender, no grimacing or guarding with palpation, no CVA tenderness    Labs:   Labs done in SNF are in [facility] EPIC. Please refer to them using [employer]/Care Everywhere.    ASSESSMENT/PLAN:  (R82.71) Asymptomatic bacteriuria  (primary encounter diagnosis)  Comment: acute, with dysuria over the weekend- obtained UA/UC. UA with WBC 5-10, Urine culture grew >100,000 colonies ESBL e.coli and 10,000-50,000 Gram negative rods- is asymptomatic today and remains afebrile.  Plan: suspect patient is colonized due to history of infection. Will not treat at this time. Spoke with nursing staff and wrote orders for them to monitor for any additional signs of UTI including fever, dysuria, frequency, urgency and notify provider with concerns. Consider treating if symptomatic    (Z86.19) Hx of Clostridium difficile infection  Comment: with history of c.diff- try to avoid unnecessary antibiotics as she would require vanco prophy with antibiotics.  -completed vanco taper 9/3  -completed 10 day course of prophy oral vanco on 9/25 while on antibiotics for UTI  Plan: Monitor for reoccurrence.     (I10) Essential hypertension  Comment: Acute on  chronic, BP variable control with some substantial drops in BP down to SBP  at lowest after taking medications-recheck today 164/80 in late morning  -Metoprolol XL changed to 50 mg BID per discussion with geriatric pharmacist  Plan: BP goals are ~130 -165/60 -90 mmHg.This is higher than ACC and AHA recommendations due to goals of care, risk for hypotension, risk of dizziness and falls and frailty. Also has been having significant drops in BP after receiving medications. Continue amlodipine 5 mg BID, metoprolol XL 50 mg BID. Continue PTA imdur, clonidine. VS per TCU policy.         Electronically signed by:  KARTHIK Walter CNP

## 2019-10-29 NOTE — LETTER
10/29/2019        RE: Daya Ignacio  [address]  [place] [address]        [place] [facility]  [place] Medical Record Number:  [id number]  Place of Service where encounter took place:  [facility] ([facility]) [[id number]]  Chief Complaint   Patient presents with     RECHECK       HPI:    Daya Ignacio  is a 89 year old (10/11/1930), who is being seen today for an episodic care visit.  HPI information obtained from: facility chart records, facility staff, patient report and [facility] chart review.     Brief Summary of Hospital/ TCU Course: Ms. Daya Ignacio is an 88 year old female with PMH of HTN, lumbar compression fractures, upper GI bleed, generalized anxiety disorder, recurrent ESBL UTI and c.diff infection. Patient returned to ED with fall after discharge from the hospital 12 hours earlier after being admitted for UTI for 6 days. Per hospital notes she was walking in her kitchen and became unwell, fell and instantly had pain in her right leg. S/p ORIF to right proximal tibia fracture on  8/28/19. Is NWB to RLE- should wear knee immobilizer at all times except for with bathing and ice. Developed leukocytosis, but suspect secondary to trauma. With multiple recurrent hospitalizations this past year for ESBL UTI and c.difficile. Completed c.diff taper 9/3. Discharged to TCU.               At TCU Ms. Ignacio was found to UTI from Klebsiella pneumoniae. Treated with 7 days of cipro (completed 9/21). While on antibiotics she received oral vanco prophy.     Today's concern is:  Ms. Daya Ignacio was seen today after results were available for UA/UC collected over the weekend. Nursing reports that over the weekend she complained of dysuria. She has remained afebrile. Today she denies dysuria, increased frequency. Has no back pain or lower abdominal pain.    Past Medical and Surgical History reviewed in Epic today.    MEDICATIONS:  Current Outpatient Medications    Medication Sig Dispense Refill     acetaminophen (TYLENOL) 500 MG tablet Take 2 tablets (1,000 mg) by mouth 3 times daily       amLODIPine (NORVASC) 10 MG tablet Take 0.5 tablets (5 mg) by mouth 2 times daily 90 tablet 1     aspirin (ASA) 325 MG EC tablet Take 1 tablet (325 mg) by mouth daily 30 tablet 0     Calcium Carbonate (CALCIUM 600 PO) Take 1 tablet by mouth 2 times daily        carvedilol (COREG) 25 MG tablet Take 0.5 tablets (12.5 mg) by mouth 2 times daily (with meals)       cefdinir (OMNICEF) 300 MG capsule Take 300 mg by mouth 2 times daily       Cholecalciferol (VITAMIN D3) 2000 UNITS CAPS Take 2,000 Units by mouth daily (with dinner)       cloNIDine (CATAPRES) 0.1 MG tablet TAKE ONE TABLET BY MOUTH TWO TIMES A  tablet 0     docusate sodium (COLACE) 100 MG capsule Take 1 capsule (100 mg) by mouth At Bedtime       estradiol (ESTRACE) 0.1 MG/GM vaginal cream Place 2 g vaginally three times a week paraben-free 6 g 3     gabapentin (NEURONTIN) 100 MG capsule Take 2 capsules (200 mg) by mouth 2 times daily 120 capsule 0     isosorbide dinitrate (ISORDIL) 10 MG tablet Take 1 tablet (10 mg) by mouth 3 times daily 270 tablet 3     Lidocaine (LIDOCARE) 4 % Patch Apply to low back topically one time a day for pain and remove per schedule as needed.       Multiple Vitamins-Minerals (PRESERVISION AREDS) CAPS Take 1 capsule by mouth 2 times daily        omeprazole (PRILOSEC) 10 MG DR capsule Take 20 mg by mouth daily       ondansetron (ZOFRAN-ODT) 4 MG ODT tab Take 1 tablet (4 mg) by mouth every 6 hours as needed for nausea or vomiting       oxyCODONE (ROXICODONE) 5 MG tablet Take 0.5 tablets (2.5 mg) by mouth every 4 hours as needed for severe pain 15 tablet 0     polyethylene glycol (MIRALAX/GLYCOLAX) packet Take 17 g by mouth daily as needed for constipation       senna-docusate (SENOKOT-S/PERICOLACE) 8.6-50 MG tablet Take 2 tablets by mouth 2 times daily as needed for constipation       SENNA-docusate  sodium (SENNA S) 8.6-50 MG tablet Take 1 tablet by mouth At Bedtime       sertraline (ZOLOFT) 100 MG tablet Take 1 tablet (100 mg) by mouth daily 90 tablet 1     vitamin C (ASCORBIC ACID) 500 MG tablet Take 500 mg by mouth daily       REVIEW OF SYSTEMS:  5 point ROS of systems including Constitutional, Respiratory, Cardiovascular, Gastroenterology, Genitourinary were all negative except for pertinent positives noted in my HPI.    Objective:  BP (!) 188/80   Pulse 69   Temp 97.4  F (36.3  C)   Resp 18   Ht 1.524 m (5')   Wt 55.9 kg (123 lb 3.2 oz)   SpO2 99%   BMI 24.06 kg/m     Exam:  GENERAL APPEARANCE:  Alert, pleasant and cooperative, elderly female sitting in chair on exam today  HEENT: normocephalic, moist mucous membranes, nose without drainage or crusting, hearing acuity: intact  RESP:  respiratory effort normal, no respiratory distress  CV: No peripheral edema,   ABDOMEN:  soft, nontender, no grimacing or guarding with palpation, no CVA tenderness    Labs:   Labs done in SNF are in [facility] EPIC. Please refer to them using [employer]/Care Everywhere.    ASSESSMENT/PLAN:  (R82.71) Asymptomatic bacteriuria  (primary encounter diagnosis)  Comment: acute, with dysuria over the weekend- obtained UA/UC. UA with WBC 5-10, Urine culture grew >100,000 colonies ESBL e.coli and 10,000-50,000 Gram negative rods- is asymptomatic today and remains afebrile.  Plan: suspect patient is colonized due to history of infection. Will not treat at this time. Spoke with nursing staff and wrote orders for them to monitor for any additional signs of UTI including fever, dysuria, frequency, urgency and notify provider with concerns. Consider treating if symptomatic    (Z86.19) Hx of Clostridium difficile infection  Comment: with history of c.diff- try to avoid unnecessary antibiotics as she would require vanco prophy with antibiotics.  -completed vanco taper 9/3  -completed 10 day course of prophy oral vanco on 9/25 while on  antibiotics for UTI  Plan: Monitor for reoccurrence.     (I10) Essential hypertension  Comment: Acute on chronic, BP variable control with some substantial drops in BP down to SBP  at lowest after taking medications-recheck today 164/80 in late morning  -Metoprolol XL changed to 50 mg BID per discussion with geriatric pharmacist  Plan: BP goals are ~130 -165/60 -90 mmHg.This is higher than ACC and AHA recommendations due to goals of care, risk for hypotension, risk of dizziness and falls and frailty. Also has been having significant drops in BP after receiving medications. Continue amlodipine 5 mg BID, metoprolol XL 50 mg BID. Continue PTA imdur, clonidine. VS per TCU policy.         Electronically signed by:  KARTHIK Walter CNP             Sincerely,        KARTHIK Walter CNP

## 2019-10-30 LAB
BACTERIA SPEC CULT: ABNORMAL
BACTERIA SPEC CULT: ABNORMAL

## 2019-11-01 ENCOUNTER — RECORDS - HEALTHEAST (OUTPATIENT)
Dept: LAB | Facility: CLINIC | Age: 84
End: 2019-11-01

## 2019-11-02 ENCOUNTER — TRANSFERRED RECORDS (OUTPATIENT)
Dept: HEALTH INFORMATION MANAGEMENT | Facility: CLINIC | Age: 84
End: 2019-11-02

## 2019-11-02 LAB
ERYTHROCYTE [DISTWIDTH] IN BLOOD BY AUTOMATED COUNT: 14.3 % (ref 11–14.5)
ERYTHROCYTE [DISTWIDTH] IN BLOOD BY AUTOMATED COUNT: 14.3 % (ref 11–14.5)
HCT VFR BLD AUTO: 28.9 % (ref 35–47)
HCT VFR BLD AUTO: 28.9 % (ref 35–47)
HEMOGLOBIN: 9.2 G/DL (ref 12–16)
HGB BLD-MCNC: 9.2 G/DL (ref 12–16)
MCH RBC QN AUTO: 31.1 PG (ref 27–34)
MCH RBC QN AUTO: 31.1 PG (ref 27–34)
MCHC RBC AUTO-ENTMCNC: 31.8 G/DL (ref 32–36)
MCHC RBC AUTO-ENTMCNC: 31.8 G/DL (ref 32–36)
MCV RBC AUTO: 98 FL (ref 80–100)
MCV RBC AUTO: 98 FL (ref 80–100)
PLATELET # BLD AUTO: 184 THOU/UL (ref 140–440)
PLATELET # BLD AUTO: 184 THOU/UL (ref 140–440)
PMV BLD AUTO: 10.7 FL (ref 8.5–12.5)
RBC # BLD AUTO: 2.96 MILL/UL (ref 3.8–5.4)
RBC # BLD AUTO: 2.96 MILL/UL (ref 3.8–5.4)
WBC # BLD AUTO: 6.6 THOU/UL (ref 4–11)
WBC: 6.6 THOU/UL (ref 4–11)

## 2019-11-04 ENCOUNTER — NURSE TRIAGE (OUTPATIENT)
Dept: NURSING | Facility: CLINIC | Age: 84
End: 2019-11-04

## 2019-11-04 NOTE — TELEPHONE ENCOUNTER
Caller is patient's daughter-in-law who stated that patient is at [facility], and wanting to make sure her providers know where she is.    Zenaida Antunez RN  [facility]      Reason for Disposition    General information question, no triage required and triager able to answer question    Protocols used: INFORMATION ONLY CALL-A-AH

## 2019-11-05 ENCOUNTER — NURSING HOME VISIT (OUTPATIENT)
Dept: GERIATRICS | Facility: CLINIC | Age: 84
End: 2019-11-05
Payer: MEDICARE

## 2019-11-05 VITALS
BODY MASS INDEX: 24.5 KG/M2 | TEMPERATURE: 97.6 F | RESPIRATION RATE: 18 BRPM | SYSTOLIC BLOOD PRESSURE: 135 MMHG | HEIGHT: 60 IN | WEIGHT: 124.8 LBS | HEART RATE: 62 BPM | DIASTOLIC BLOOD PRESSURE: 60 MMHG | OXYGEN SATURATION: 93 %

## 2019-11-05 DIAGNOSIS — Z86.19 HX OF CLOSTRIDIUM DIFFICILE INFECTION: ICD-10-CM

## 2019-11-05 DIAGNOSIS — N39.0 URINARY TRACT INFECTION DUE TO ESBL KLEBSIELLA: ICD-10-CM

## 2019-11-05 DIAGNOSIS — I10 ESSENTIAL HYPERTENSION: ICD-10-CM

## 2019-11-05 DIAGNOSIS — B96.89 URINARY TRACT INFECTION DUE TO ESBL KLEBSIELLA: ICD-10-CM

## 2019-11-05 DIAGNOSIS — Z87.81 HISTORY OF COMPRESSION FRACTURE OF SPINE: ICD-10-CM

## 2019-11-05 DIAGNOSIS — Z87.81 S/P ORIF (OPEN REDUCTION INTERNAL FIXATION) FRACTURE: ICD-10-CM

## 2019-11-05 DIAGNOSIS — D72.829 LEUKOCYTOSIS, UNSPECIFIED TYPE: ICD-10-CM

## 2019-11-05 DIAGNOSIS — Z98.890 S/P ORIF (OPEN REDUCTION INTERNAL FIXATION) FRACTURE: ICD-10-CM

## 2019-11-05 DIAGNOSIS — S82.101D CLOSED FRACTURE OF PROXIMAL END OF RIGHT TIBIA AND FIBULA WITH ROUTINE HEALING, SUBSEQUENT ENCOUNTER: Primary | ICD-10-CM

## 2019-11-05 DIAGNOSIS — D64.9 ANEMIA, UNSPECIFIED TYPE: ICD-10-CM

## 2019-11-05 DIAGNOSIS — Z87.19 HISTORY OF GI BLEED: ICD-10-CM

## 2019-11-05 DIAGNOSIS — D69.6 THROMBOCYTOPENIA (H): ICD-10-CM

## 2019-11-05 DIAGNOSIS — S82.831D CLOSED FRACTURE OF PROXIMAL END OF RIGHT TIBIA AND FIBULA WITH ROUTINE HEALING, SUBSEQUENT ENCOUNTER: Primary | ICD-10-CM

## 2019-11-05 DIAGNOSIS — F41.1 GENERALIZED ANXIETY DISORDER: ICD-10-CM

## 2019-11-05 DIAGNOSIS — R53.81 PHYSICAL DECONDITIONING: ICD-10-CM

## 2019-11-05 PROCEDURE — 99310 SBSQ NF CARE HIGH MDM 45: CPT | Performed by: NURSE PRACTITIONER

## 2019-11-05 RX ORDER — MULTIPLE VITAMINS W/ MINERALS TAB 9MG-400MCG
1 TAB ORAL DAILY
COMMUNITY
End: 2020-05-06

## 2019-11-05 ASSESSMENT — MIFFLIN-ST. JEOR: SCORE: 912.59

## 2019-11-05 NOTE — LETTER
11/5/2019        RE: Daya Ignacio  [address] [facility] [address]        [place] [facility]  [place] [facility] Record Number:  [id number]  Place of Service where encounter took place:  [facility] ([facility] [[id number]]  Chief Complaint   Patient presents with     RECHECK       HPI:    Daya Ignacio  is a 89 year old (10/11/1930), who is being seen today for an episodic care visit.  HPI information obtained from: facility chart records, facility staff, patient report, [facility] chart review and family/first contact daughter report.     Per recent TCU provider progress notes:  88 year old female with PMH of HTN, lumbar compression fractures, upper GI bleed, generalized anxiety disorder, recurrent ESBL UTI and c.diff infection. Hospitalized in August due to UTI, returned to hospital within one day due to fall and right leg fracture needed ORIF to right proximal tibia fracture on 8/28/19. Now wears knee immobilizer. Developed leukocytosis secondary to trauma. Multiple recurrent hospitalizations this past year for ESBL UTI and c.difficile. Is on c.diff taper through 9/3. Recently treated with Cipro for recurrent UTI. Frequent urinary symptoms suspect colonization. Ongoing labile BPs/HTN: BP goal 130-160s/60-90 due to dizziness, falls, frail. Currently on Norvasc 5 mg BID, carvedilol 12.5 mg po bid. Continue PTA Imdur, clonidine. VS per TCU policy.      Today's concern is:  Patient seen for episodic TCU follow up. Daughter present for visit. Note SBP range 135-190s, frequently elevated first thing in AM. HR 60-70s. Sats are 98% on room air. Patient no longer has any urinary symptoms and no fevers. We discussed bladder colonization and what true symptoms of UTI would be. Last WBC normal per provider report. Occasional right leg pain managed with tylenol and Voltaren as well as occasional PRN oxycodone used sparingly per patient and family. Therapy notes state she is up 150  ft with 4WW and CGA. Due to f/u with ortho 11/19.     Past Medical and Surgical History reviewed in Epic today.    MEDICATIONS:  Current Outpatient Medications   Medication Sig Dispense Refill     acetaminophen (TYLENOL) 500 MG tablet Take 2 tablets (1,000 mg) by mouth 3 times daily       amLODIPine (NORVASC) 10 MG tablet Take 0.5 tablets (5 mg) by mouth 2 times daily 90 tablet 1     aspirin (ASA) 325 MG EC tablet Take 1 tablet (325 mg) by mouth daily 30 tablet 0     Calcium Carbonate (CALCIUM 600 PO) Take 1 tablet by mouth 2 times daily        carvedilol (COREG) 25 MG tablet Take 0.5 tablets (12.5 mg) by mouth 2 times daily (with meals) (Patient taking differently: Take by mouth 2 times daily (with meals) Take 12.5 mg in AM and 25 mg PO at HS)       Cholecalciferol (VITAMIN D3) 2000 UNITS CAPS Take 2,000 Units by mouth daily (with dinner)       cloNIDine (CATAPRES) 0.1 MG tablet TAKE ONE TABLET BY MOUTH TWO TIMES A  tablet 0     diclofenac (VOLTAREN) 1 % topical gel Apply 2 g topically 3 times daily Apply to right knee       docusate sodium (COLACE) 100 MG capsule Take 1 capsule (100 mg) by mouth At Bedtime       estradiol (ESTRACE) 0.1 MG/GM vaginal cream Place 2 g vaginally three times a week paraben-free 6 g 3     gabapentin (NEURONTIN) 100 MG capsule Take 2 capsules (200 mg) by mouth 2 times daily 120 capsule 0     isosorbide dinitrate (ISORDIL) 10 MG tablet Take 1 tablet (10 mg) by mouth 3 times daily 270 tablet 3     Lidocaine (LIDOCARE) 4 % Patch Apply to low back topically one time a day for pain and remove per schedule as needed.       Multiple Vitamins-Minerals (PRESERVISION AREDS) CAPS Take 1 capsule by mouth 2 times daily        multivitamin w/minerals (THERA-VIT-M) tablet Take 1 tablet by mouth daily       omeprazole (PRILOSEC) 10 MG DR capsule Take 20 mg by mouth daily       ondansetron (ZOFRAN-ODT) 4 MG ODT tab Take 1 tablet (4 mg) by mouth every 6 hours as needed for nausea or vomiting        oxyCODONE (ROXICODONE) 5 MG tablet Take 0.5 tablets (2.5 mg) by mouth every 4 hours as needed for severe pain 15 tablet 0     polyethylene glycol (MIRALAX/GLYCOLAX) packet Take 17 g by mouth daily as needed for constipation       senna-docusate (SENOKOT-S/PERICOLACE) 8.6-50 MG tablet Take 2 tablets by mouth 2 times daily as needed for constipation       SENNA-docusate sodium (SENNA S) 8.6-50 MG tablet Take 1 tablet by mouth At Bedtime       sertraline (ZOLOFT) 100 MG tablet Take 1 tablet (100 mg) by mouth daily 90 tablet 1     vitamin C (ASCORBIC ACID) 500 MG tablet Take 500 mg by mouth daily       REVIEW OF SYSTEMS:  10 point ROS of systems including Constitutional, Eyes, Respiratory, Cardiovascular, Gastroenterology, Genitourinary, Integumentary, Musculoskeletal, Psychiatric were all negative except for pertinent positives noted in my HPI.    Objective:  /60   Pulse 62   Temp 97.6  F (36.4  C)   Resp 18   Ht 1.524 m (5')   Wt 56.6 kg (124 lb 12.8 oz)   SpO2 93%   BMI 24.37 kg/m     Exam:  GENERAL APPEARANCE:  Alert, in no distress, pleasant, cooperative, oriented x self, place, date and family  EYES:  EOM, lids, pupils and irises normal, sclera clear and conjunctiva normal, no discharge or mattering on lids or lashes noted  ENT:  Mouth normal, moist mucous membranes, nose normal without drainage or crusting, external ears without lesions, hearing acuity intact  RESP:  respiratory effort normal, no chest wall tenderness, no respiratory distress, Lung sounds clear but diminished all fields, patient is on room air  CV:  Auscultation of heart done, rate and rhythm controlled and regular, no murmur, no rub or gallop. Edema none bilateral lower extremities. VASCULAR: no open areas on extremities noted  ABDOMEN:  normal bowel sounds, soft, nontender, no palpable masses.  M/S:   Gait and station walks with assist , no tenderness or swelling of the joints; able to move all extremities, digits normal.  Immobilizer in place right leg  NEURO: cranial nerves 2-12 grossly intact, no facial asymmetry, no speech deficits and able to follow directions, moves all extremities symmetrically  PSYCH:  insight and judgement and memory at baseline, affect and mood normal     Labs:   11/2 WBC 6.6, Hgb 9.2, Plt 184    Most Recent 3 CBC's:  Recent Labs   Lab Test 09/17/19 09/09/19 09/07/19 09/04/19 08/30/19  0742 08/29/19  0716   WBC  --   --   --   --  6.7 11.2* 20.3*   HGB 9.4* 8.3* 8.1*   < > 7.2* 8.0* 8.1*   MCV  --   --   --   --  96 100 102*   PLT  --   --   --   --  241 188 163    < > = values in this interval not displayed.     Most Recent 3 BMP's:  Recent Labs   Lab Test 10/28/19 09/26/19 09/23/19    138 137   POTASSIUM 3.6 3.9 3.4*   CHLORIDE 102 103 102   CO2 30 27 28   BUN 18 19 16   CR 0.71 0.69 0.63   ANIONGAP 6 8 7   DAVE 9.8 9.6 9.9   GLC 85 88 88       ASSESSMENT/PLAN:  Closed fracture of proximal end of right tibia and fibula with routine healing, subsequent encounter  S/P ORIF (open reduction internal fixation) fracture  Physical deconditioning  History of compression fracture of spine  Ongoing issues - progressing gradually in therapies, f/u with progress next week. F/U ortho as recommended. Pain meds tylenol, Voltaren and oxycodone as ordered. Immobilizer in place when up    Urinary tract infection due to ESBL Klebsiella  By history- no current symptoms. Monitor for any recurrence.     Hx of Clostridium difficile infection  Resolved - no diarrhea or fevers. Monitor     Essential hypertension  Chronic, not well managed. BPs remain elevated in AM with occasional hypotension. Will increase HS carvedilol dose to 25 mg PO and leave AM dose at 12.5 mg PO. F/U with BPs within the next week or sooner PRN. Continue other bp meds, vs, wt as ordered.     Generalized anxiety disorder  By history, denies symptoms at this time. Monitor.     Anemia, unspecified type  Leukocytosis, unspecified type  Thrombocytopenia  (H)  History of GI bleed  Acute on chronic - no GI symptoms. Leukocytosis and thrombocytopenia resolved. Hgb improved - f/u PRN.     Orders written by provider at facility:  Change carvedilol to 12.5 mg PO in AM and 25 mg PO at HS diagnosis HTN    Total time spent with patient visit at the [facility] nursing [place] [facility] was 35 min including patient visit, review of past records and phone calls from SW with updates. Greater than 50% of total time spent with counseling and coordinating care due to discussion with the patient and family concerning diagnostic results/impressions from recent UA/UC and CBC tests and recommended diagnostic studies if symptoms were to recur as well as qualifying symptoms, review of VS and progress in therapies and current pain management strategies, plan for follow up with ortho and proposed BP medication changes. Communicated with SW re: follow up plan and nursing regarding changes as above.     Electronically signed by:  KARTHIK Dueñas CNP             Sincerely,        KARTHIK Dueñas CNP

## 2019-11-06 ENCOUNTER — PATIENT OUTREACH (OUTPATIENT)
Dept: CARE COORDINATION | Facility: CLINIC | Age: 84
End: 2019-11-06

## 2019-11-06 NOTE — PROGRESS NOTES
Clinic Care Coordination Contact    Situation: Patient chart reviewed by care coordinator.    Background: Patient currently enrolled into care coordination with the goal of preventing rehospitalization within 30 days. Patient has been residing at [facility] following hospitalization end of August for tibia, fibula fracture following a fall.     Assessment: Patient continues to remain at TCU with most recent visit recorded in Epic dated yesterday 11/05. Left message for  Alisson at TCU requesting call back with updates and discharge plans.     Plan/Recommendations: RN Care Coordinator will await notification from facility staff informing RN Care Coordinator of patient's discharge plans/needs. RN Care Coordinator will review chart and outreach to facility staff every 4 weeks and as needed.     Elizabeth Simmons RN Care Coordinator  [facility]  Email: [email or web address]  Phone: [phone]

## 2019-11-07 ENCOUNTER — HEALTH MAINTENANCE LETTER (OUTPATIENT)
Age: 84
End: 2019-11-07

## 2019-11-07 DIAGNOSIS — S82.201A TIBIA/FIBULA FRACTURE, RIGHT, CLOSED, INITIAL ENCOUNTER: ICD-10-CM

## 2019-11-07 DIAGNOSIS — S82.401A TIBIA/FIBULA FRACTURE, RIGHT, CLOSED, INITIAL ENCOUNTER: ICD-10-CM

## 2019-11-07 RX ORDER — OXYCODONE HYDROCHLORIDE 5 MG/1
2.5 TABLET ORAL EVERY 4 HOURS PRN
Qty: 15 TABLET | Refills: 0 | Status: SHIPPED | OUTPATIENT
Start: 2019-11-07 | End: 2019-11-14

## 2019-11-11 NOTE — PROGRESS NOTES
Clinic Care Coordination Contact  Care Team Conversations    Alisson returns call to writer. Informs writer that no date has yet been set for discharge, but will be discharging to [facility]. Will continue to monitor and review chart again in 4 weeks and/or as needed.     Elizabeth Simmons RN Care Coordinator  [facility]  Email: [email or web address]  Phone: [phone]

## 2019-11-12 ENCOUNTER — NURSING HOME VISIT (OUTPATIENT)
Dept: GERIATRICS | Facility: CLINIC | Age: 84
End: 2019-11-12
Payer: MEDICARE

## 2019-11-12 VITALS
HEART RATE: 64 BPM | WEIGHT: 123.6 LBS | RESPIRATION RATE: 18 BRPM | DIASTOLIC BLOOD PRESSURE: 84 MMHG | BODY MASS INDEX: 24.26 KG/M2 | HEIGHT: 60 IN | OXYGEN SATURATION: 98 % | SYSTOLIC BLOOD PRESSURE: 191 MMHG | TEMPERATURE: 97.6 F

## 2019-11-12 DIAGNOSIS — R09.89 LABILE HYPERTENSION: ICD-10-CM

## 2019-11-12 DIAGNOSIS — R53.81 PHYSICAL DECONDITIONING: ICD-10-CM

## 2019-11-12 DIAGNOSIS — F41.1 GENERALIZED ANXIETY DISORDER: ICD-10-CM

## 2019-11-12 DIAGNOSIS — Z86.19 HX OF CLOSTRIDIUM DIFFICILE INFECTION: ICD-10-CM

## 2019-11-12 DIAGNOSIS — D64.9 ANEMIA, UNSPECIFIED TYPE: ICD-10-CM

## 2019-11-12 DIAGNOSIS — S82.101D CLOSED FRACTURE OF PROXIMAL END OF RIGHT TIBIA AND FIBULA WITH ROUTINE HEALING, SUBSEQUENT ENCOUNTER: Primary | ICD-10-CM

## 2019-11-12 DIAGNOSIS — Z87.440 PERSONAL HISTORY OF URINARY TRACT INFECTION: ICD-10-CM

## 2019-11-12 DIAGNOSIS — R41.89 COGNITIVE IMPAIRMENT: ICD-10-CM

## 2019-11-12 DIAGNOSIS — S82.831D CLOSED FRACTURE OF PROXIMAL END OF RIGHT TIBIA AND FIBULA WITH ROUTINE HEALING, SUBSEQUENT ENCOUNTER: Primary | ICD-10-CM

## 2019-11-12 PROCEDURE — 99305 1ST NF CARE MODERATE MDM 35: CPT | Mod: AI | Performed by: INTERNAL MEDICINE

## 2019-11-12 RX ORDER — CARVEDILOL 25 MG/1
12.5 TABLET ORAL EVERY MORNING
COMMUNITY
End: 2024-05-02

## 2019-11-12 ASSESSMENT — MIFFLIN-ST. JEOR: SCORE: 907.15

## 2019-11-12 NOTE — PROGRESS NOTES
"[place] [facility]  PHYSICIAN NOTE    PRIMARY CARE PROVIDER AND CLINIC:  Marcy Soraes PA-C, [address] [facility] [address]    Chief Complaint   Patient presents with     Hospital F/U     [place] Medical Record Number:  [id number]  Place of Service where encounter took place:  [facility] ([facility]) [[id number]]    Daya Ignacio is a 89 year old (10/11/1930), admitted to the above facility from  [facility]. Hospital stay 8/27/19 through 8/30/19. Admitted to this facility for  rehab, medical management and nursing care.     HPI:    HPI information obtained from: facility chart records, facility staff, patient report and [facility] chart review.     Brief summary of hospital course: Daya has h/o UTI with ESBL, c.diff, compression fractures, anxiety and HTN who was admitted after a fall with unfortunately sustaining closed proximal right tib/fib fracture s/p ORIF tibia on 8/28/19. She was placed in knee immobilizer and non-weight bearing for several weeks but f/u ortho appointment changed weight bearing restrictions. She is now working with therapies again as can get up and walk with a walker and contact guard assist. She is aware that she has another ortho f/u appointment next week on 11/19/19.    Updates on status since skilled nursing admission: Daya is seen resting in bed today after lunch. She is appreciative of care staff here. D/t recent hospitalizations, she has insight into plan for probable transition to [facility] rather than her current independent apartment as she is needing some more assistance. Says pain medication Oxycodone does take the \"edge\" off of the pain in her right leg from 7 to 4 / 10. Says the Lidocaine patches help her back pain.She feels her mood is \"ok\", denies bowel/bladder concerns, denies chest pain or dyspnea. Her blood pressures have been very variable even within a few hours of one another (lowest systolics 90s to max low " 200s). Denies symptoms with these swings, no headaches or visual changes outside of her baseline.    CODE STATUS/ADVANCE DIRECTIVES DISCUSSION:   DNR / DNI  Patient's living condition: lives alone in an apartment but may transition to higher level of care at discharge     ALLERGIES: Atorvastatin; Macrobid [nitrofurantoin anhydrous]; and Sulfa drugs    Past Medical History:   Diagnosis Date     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 4/16/2014     h/o Clostridium difficile colitis 06/2019     Hypertension      Lumbar compression fracture (H)      Macular degeneration (senile) of retina, unspecified      Upper GI bleed 06/2019     Urinary tract infection due to extended-spectrum beta lactamase (ESBL)-producing Klebsiella 05/2019    resistant to Bactrim      Past Surgical History:   Procedure Laterality Date     Cataract removal NOS Bilateral      CYSTOSCOPY N/A 8/1/2019    Procedure: Exam under anesthesia, video cystopanendoscopy;  Surgeon: Dennis Carlson MD;  Location:  OR     ESOPHAGOSCOPY, GASTROSCOPY, DUODENOSCOPY (EGD), COMBINED N/A 6/11/2019    Procedure: ESOPHAGOGASTRODUODENOSCOPY (EGD);  Surgeon: Gaurav Degroot MD;  Location:  GI     Hip Pinning procedure Left      Hip replacement NOS Right      OPEN REDUCTION INTERNAL FIXATION TIBIAL PLATEAU Right 8/28/2019    Procedure: Open reduction internal fixation right proximal tibia fracture;  Surgeon: Molina Pardo MD;  Location:  OR     TONSILLECTOMY, ADENOIDECTOMY, COMBINED       Family History   Problem Relation Age of Onset     Alzheimer Disease Mother         mild     Cardiovascular Father         heart attack     Neurologic Disorder Brother 83        Parkinson's     Social History     Tobacco Use     Smoking status: Never Smoker     Smokeless tobacco: Never Used   Substance Use Topics     Alcohol use: No     Drug use: No        Post-discharge medication reconciliation status: Reviewed in [facility]; several BP medication changes  have occurred     Current Outpatient Medications   Medication Sig Dispense Refill     acetaminophen (TYLENOL) 500 MG tablet Take 2 tablets (1,000 mg) by mouth 3 times daily       amLODIPine (NORVASC) 10 MG tablet Take 0.5 tablets (5 mg) by mouth 2 times daily 90 tablet 1     aspirin (ASA) 325 MG EC tablet Take 1 tablet (325 mg) by mouth daily 30 tablet 0     Calcium Carbonate (CALCIUM 600 PO) Take 1 tablet by mouth 2 times daily        carvedilol (COREG) 25 MG tablet Take 12.5 mg by mouth every morning AND 25 MG PO QHS       Cholecalciferol (VITAMIN D3) 2000 UNITS CAPS Take 2,000 Units by mouth daily (with dinner)       cloNIDine (CATAPRES) 0.1 MG tablet TAKE ONE TABLET BY MOUTH TWO TIMES A  tablet 0     diclofenac (VOLTAREN) 1 % topical gel Apply 2 g topically 3 times daily Apply to right knee       docusate sodium (COLACE) 100 MG capsule Take 1 capsule (100 mg) by mouth At Bedtime       estradiol (ESTRACE) 0.1 MG/GM vaginal cream Place 2 g vaginally three times a week paraben-free 6 g 3     gabapentin (NEURONTIN) 100 MG capsule Take 2 capsules (200 mg) by mouth 2 times daily 120 capsule 0     isosorbide dinitrate (ISORDIL) 10 MG tablet Take 1 tablet (10 mg) by mouth 3 times daily 270 tablet 3     Lidocaine (LIDOCARE) 4 % Patch Apply to low back topically one time a day for pain and remove per schedule as needed.       Multiple Vitamins-Minerals (PRESERVISION AREDS) CAPS Take 1 capsule by mouth 2 times daily        multivitamin w/minerals (THERA-VIT-M) tablet Take 1 tablet by mouth daily       omeprazole (PRILOSEC) 10 MG DR capsule Take 20 mg by mouth daily       ondansetron (ZOFRAN-ODT) 4 MG ODT tab Take 1 tablet (4 mg) by mouth every 6 hours as needed for nausea or vomiting       oxyCODONE (ROXICODONE) 5 MG tablet Take 0.5 tablets (2.5 mg) by mouth every 4 hours as needed for severe pain 15 tablet 0     polyethylene glycol (MIRALAX/GLYCOLAX) packet Take 17 g by mouth daily as needed for constipation        senna-docusate (SENOKOT-S/PERICOLACE) 8.6-50 MG tablet Take 2 tablets by mouth 2 times daily as needed for constipation       SENNA-docusate sodium (SENNA S) 8.6-50 MG tablet Take 1 tablet by mouth At Bedtime       sertraline (ZOLOFT) 100 MG tablet Take 1 tablet (100 mg) by mouth daily 90 tablet 1     vitamin C (ASCORBIC ACID) 500 MG tablet Take 500 mg by mouth daily         ROS:  Limited secondary to cognitive impairment but today pt reports as above in HPI    Exam:  BP (!) 191/84   Pulse 64   Temp 97.6  F (36.4  C)   Resp 18   Ht 1.524 m (5')   Wt 56.1 kg (123 lb 9.6 oz)   SpO2 98%   BMI 24.14 kg/m    Systolics vary 90-200s  Alert, casually dressed, no odor  No scleral icterus  Moist oral mucosa  HRRR without mrg  Lungs clear  Abdomen soft, NT  No edema  Wearing right knee immobilizer  Mood bright, cheerful  Normal speech, no tremor    Lab/Diagnostic data:  Last Basic Metabolic Panel:  Sodium   Date Value Ref Range Status   10/28/2019 138 136 - 145 mmol/L Final     Potassium   Date Value Ref Range Status   10/28/2019 3.6 3.5 - 5.0 mmol/L Final     Chloride   Date Value Ref Range Status   10/28/2019 102 98 - 107 mmol/L Final     Carbon Dioxide   Date Value Ref Range Status   10/28/2019 30 22 - 31 mmol/L Final     Anion Gap   Date Value Ref Range Status   10/28/2019 6 5 - 18 mmol/L Final     Glucose   Date Value Ref Range Status   10/28/2019 85 70 - 125 mg/dL Final     Urea Nitrogen   Date Value Ref Range Status   10/28/2019 18 8 - 28 mg/dL Final     Creatinine   Date Value Ref Range Status   10/28/2019 0.71 0.60 - 1.10 mg/dL Final     GFR Estimate   Date Value Ref Range Status   10/28/2019 >60 >60 ml/min/1.73m2 Final     Calcium   Date Value Ref Range Status   10/28/2019 9.8 8.5 - 10.5 mg/dL Final       Lab Results   Component Value Date    WBC 6.6 11/02/2019     Lab Results   Component Value Date    RBC 2.96 11/02/2019     Lab Results   Component Value Date    HGB 9.2 11/02/2019     Lab Results   Component  Value Date    HCT 28.9 11/02/2019     Lab Results   Component Value Date    MCV 98 11/02/2019     Lab Results   Component Value Date    MCH 31.1 11/02/2019     Lab Results   Component Value Date    MCHC 31.8 11/02/2019     Lab Results   Component Value Date    RDW 14.3 11/02/2019     Lab Results   Component Value Date     11/02/2019       ASSESSMENT/PLAN:  Closed fracture of proximal end of right tibia and fibula with routine healing, subsequent encounter  Physical deconditioning  Working with therapies again now as can be weight bearing  F/u with ortho scheduled 11/19/19  Taking Oxycodone PRN about 2-3 times per day and is on bowel regimen   mg daily for DVT prophy (?ok to discontinue now this far out from surgery and ambulating - could ask at ortho appointment next week)    Cognitive impairment  Generalized anxiety disorder  Is on Sertraline; seemed relaxed and pleasant today  Occupational therapy cognitive eval and treat     Anemia, unspecified type  Hgb slightly improving to 9s from 7-8 range  See above re: ? Discontinue full dose ASA    Labile hypertension  Readings are extremely variable; asymptomatic though  Her beta-blocker had been changed from Toprol to Coreg at TCU  Is on some second-line therapies (Clonidine and Isordil); also Coreg and Amlodipine  No known allergy to diuretics or ACE/ARB noted (which would be more 1st line therapies) and she has unremarkable renal function so could tolerate trial of either of these; of note, was on Lisinopril but it was discontinued in July hospitalization when she had AYAKA - nephrology followed that hospital stay and said ok to resume Lisinopril when creatinine normalized with close f/u labs  Pain may contribute at times as well as stress/anxiety  Suggested to RN that she recheck vitals 5-10 minutes after first set to see if BP calms down when really high    Personal history of urinary tract infection d/t ESBL  Currently off of antibiotics and denying  symptoms     Hx of Clostridium difficile infection  No longer on antibiotics         Electronically signed by:  Gisela Meza DO

## 2019-11-12 NOTE — LETTER
"    11/12/2019        RE: Daya Ignacio  [address]  [place] [address]        [place] [facility]  PHYSICIAN NOTE    PRIMARY CARE PROVIDER AND CLINIC:  Marcy Soares PA-C, [address] [facility] [address]    Chief Complaint   Patient presents with     Hospital F/U     [facility] Medical Record Number:  [id number]  Place of Service where encounter took place:  [facility] ([facility]) [[id number]]    Daya Ignacio is a 89 year old (10/11/1930), admitted to the above facility from  [facility]. Hospital stay 8/27/19 through 8/30/19. Admitted to this facility for  rehab, medical management and nursing care.     HPI:    HPI information obtained from: facility chart records, facility staff, patient report and [facility] chart review.     Brief summary of hospital course: Daya has h/o UTI with ESBL, c.diff, compression fractures, anxiety and HTN who was admitted after a fall with unfortunately sustaining closed proximal right tib/fib fracture s/p ORIF tibia on 8/28/19. She was placed in knee immobilizer and non-weight bearing for several weeks but f/u ortho appointment changed weight bearing restrictions. She is now working with therapies again as can get up and walk with a walker and contact guard assist. She is aware that she has another ortho f/u appointment next week on 11/19/19.    Updates on status since skilled nursing admission: Daya is seen resting in bed today after lunch. She is appreciative of care staff here. D/t recent hospitalizations, she has insight into plan for probable transition to [facility] rather than her current independent apartment as she is needing some more assistance. Says pain medication Oxycodone does take the \"edge\" off of the pain in her right leg from 7 to 4 / 10. Says the Lidocaine patches help her back pain.She feels her mood is \"ok\", denies bowel/bladder concerns, denies chest pain or dyspnea. Her blood pressures have been very " variable even within a few hours of one another (lowest systolics 90s to max low 200s). Denies symptoms with these swings, no headaches or visual changes outside of her baseline.    CODE STATUS/ADVANCE DIRECTIVES DISCUSSION:   DNR / DNI  Patient's living condition: lives alone in an apartment but may transition to higher level of care at discharge     ALLERGIES: Atorvastatin; Macrobid [nitrofurantoin anhydrous]; and Sulfa drugs    Past Medical History:   Diagnosis Date     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 4/16/2014     h/o Clostridium difficile colitis 06/2019     Hypertension      Lumbar compression fracture (H)      Macular degeneration (senile) of retina, unspecified      Upper GI bleed 06/2019     Urinary tract infection due to extended-spectrum beta lactamase (ESBL)-producing Klebsiella 05/2019    resistant to Bactrim      Past Surgical History:   Procedure Laterality Date     Cataract removal NOS Bilateral      CYSTOSCOPY N/A 8/1/2019    Procedure: Exam under anesthesia, video cystopanendoscopy;  Surgeon: Dennis Carlson MD;  Location:  OR     ESOPHAGOSCOPY, GASTROSCOPY, DUODENOSCOPY (EGD), COMBINED N/A 6/11/2019    Procedure: ESOPHAGOGASTRODUODENOSCOPY (EGD);  Surgeon: Gaurav Degroot MD;  Location:  GI     Hip Pinning procedure Left      Hip replacement NOS Right      OPEN REDUCTION INTERNAL FIXATION TIBIAL PLATEAU Right 8/28/2019    Procedure: Open reduction internal fixation right proximal tibia fracture;  Surgeon: Molina Pardo MD;  Location:  OR     TONSILLECTOMY, ADENOIDECTOMY, COMBINED       Family History   Problem Relation Age of Onset     Alzheimer Disease Mother         mild     Cardiovascular Father         heart attack     Neurologic Disorder Brother 83        Parkinson's     Social History     Tobacco Use     Smoking status: Never Smoker     Smokeless tobacco: Never Used   Substance Use Topics     Alcohol use: No     Drug use: No        Post-discharge medication  recon[facility] status[facility] several BP medication changes have occurred     Current Outpatient Medications   Medication Sig Dispense Refill     acetaminophen (TYLENOL) 500 MG tablet Take 2 tablets (1,000 mg) by mouth 3 times daily       amLODIPine (NORVASC) 10 MG tablet Take 0.5 tablets (5 mg) by mouth 2 times daily 90 tablet 1     aspirin (ASA) 325 MG EC tablet Take 1 tablet (325 mg) by mouth daily 30 tablet 0     Calcium Carbonate (CALCIUM 600 PO) Take 1 tablet by mouth 2 times daily        carvedilol (COREG) 25 MG tablet Take 12.5 mg by mouth every morning AND 25 MG PO QHS       Cholecalciferol (VITAMIN D3) 2000 UNITS CAPS Take 2,000 Units by mouth daily (with dinner)       cloNIDine (CATAPRES) 0.1 MG tablet TAKE ONE TABLET BY MOUTH TWO TIMES A  tablet 0     diclofenac (VOLTAREN) 1 % topical gel Apply 2 g topically 3 times daily Apply to right knee       docusate sodium (COLACE) 100 MG capsule Take 1 capsule (100 mg) by mouth At Bedtime       estradiol (ESTRACE) 0.1 MG/GM vaginal cream Place 2 g vaginally three times a week paraben-free 6 g 3     gabapentin (NEURONTIN) 100 MG capsule Take 2 capsules (200 mg) by mouth 2 times daily 120 capsule 0     isosorbide dinitrate (ISORDIL) 10 MG tablet Take 1 tablet (10 mg) by mouth 3 times daily 270 tablet 3     Lidocaine (LIDOCARE) 4 % Patch Apply to low back topically one time a day for pain and remove per schedule as needed.       Multiple Vitamins-Minerals (PRESERVISION AREDS) CAPS Take 1 capsule by mouth 2 times daily        multivitamin w/minerals (THERA-VIT-M) tablet Take 1 tablet by mouth daily       omeprazole (PRILOSEC) 10 MG DR capsule Take 20 mg by mouth daily       ondansetron (ZOFRAN-ODT) 4 MG ODT tab Take 1 tablet (4 mg) by mouth every 6 hours as needed for nausea or vomiting       oxyCODONE (ROXICODONE) 5 MG tablet Take 0.5 tablets (2.5 mg) by mouth every 4 hours as needed for severe pain 15 tablet 0     polyethylene glycol  (MIRALAX/GLYCOLAX) packet Take 17 g by mouth daily as needed for constipation       senna-docusate (SENOKOT-S/PERICOLACE) 8.6-50 MG tablet Take 2 tablets by mouth 2 times daily as needed for constipation       SENNA-docusate sodium (SENNA S) 8.6-50 MG tablet Take 1 tablet by mouth At Bedtime       sertraline (ZOLOFT) 100 MG tablet Take 1 tablet (100 mg) by mouth daily 90 tablet 1     vitamin C (ASCORBIC ACID) 500 MG tablet Take 500 mg by mouth daily         ROS:  Limited secondary to cognitive impairment but today pt reports as above in HPI    Exam:  BP (!) 191/84   Pulse 64   Temp 97.6  F (36.4  C)   Resp 18   Ht 1.524 m (5')   Wt 56.1 kg (123 lb 9.6 oz)   SpO2 98%   BMI 24.14 kg/m     Systolics vary 90-200s  Alert, casually dressed, no odor  No scleral icterus  Moist oral mucosa  HRRR without mrg  Lungs clear  Abdomen soft, NT  No edema  Wearing right knee immobilizer  Mood bright, cheerful  Normal speech, no tremor    Lab/Diagnostic data:  Last Basic Metabolic Panel:  Sodium   Date Value Ref Range Status   10/28/2019 138 136 - 145 mmol/L Final     Potassium   Date Value Ref Range Status   10/28/2019 3.6 3.5 - 5.0 mmol/L Final     Chloride   Date Value Ref Range Status   10/28/2019 102 98 - 107 mmol/L Final     Carbon Dioxide   Date Value Ref Range Status   10/28/2019 30 22 - 31 mmol/L Final     Anion Gap   Date Value Ref Range Status   10/28/2019 6 5 - 18 mmol/L Final     Glucose   Date Value Ref Range Status   10/28/2019 85 70 - 125 mg/dL Final     Urea Nitrogen   Date Value Ref Range Status   10/28/2019 18 8 - 28 mg/dL Final     Creatinine   Date Value Ref Range Status   10/28/2019 0.71 0.60 - 1.10 mg/dL Final     GFR Estimate   Date Value Ref Range Status   10/28/2019 >60 >60 ml/min/1.73m2 Final     Calcium   Date Value Ref Range Status   10/28/2019 9.8 8.5 - 10.5 mg/dL Final       Lab Results   Component Value Date    WBC 6.6 11/02/2019     Lab Results   Component Value Date    RBC 2.96 11/02/2019      Lab Results   Component Value Date    HGB 9.2 11/02/2019     Lab Results   Component Value Date    HCT 28.9 11/02/2019     Lab Results   Component Value Date    MCV 98 11/02/2019     Lab Results   Component Value Date    MCH 31.1 11/02/2019     Lab Results   Component Value Date    MCHC 31.8 11/02/2019     Lab Results   Component Value Date    RDW 14.3 11/02/2019     Lab Results   Component Value Date     11/02/2019       ASSESSMENT/PLAN:  Closed fracture of proximal end of right tibia and fibula with routine healing, subsequent encounter  Physical deconditioning  Working with therapies again now as can be weight bearing  F/u with ortho scheduled 11/19/19  Taking Oxycodone PRN about 2-3 times per day and is on bowel regimen   mg daily for DVT prophy (?ok to discontinue now this far out from surgery and ambulating - could ask at ortho appointment next week)    Cognitive impairment  Generalized anxiety disorder  Is on Sertraline; seemed relaxed and pleasant today  Occupational therapy cognitive eval and treat     Anemia, unspecified type  Hgb slightly improving to 9s from 7-8 range  See above re: ? Discontinue full dose ASA    Labile hypertension  Readings are extremely variable; asymptomatic though  Her beta-blocker had been changed from Toprol to Coreg at TCU  Is on some second-line therapies (Clonidine and Isordil); also Coreg and Amlodipine  No known allergy to diuretics or ACE/ARB noted (which would be more 1st line therapies) and she has unremarkable renal function so could tolerate trial of either of these; of note, was on Lisinopril but it was discontinued in July hospitalization when she had AYAKA - nephrology followed that hospital stay and said ok to resume Lisinopril when creatinine normalized with close f/u labs  Pain may contribute at times as well as stress/anxiety  Suggested to RN that she recheck vitals 5-10 minutes after first set to see if BP calms down when really high    Personal  history of urinary tract infection d/t ESBL  Currently off of antibiotics and denying symptoms     Hx of Clostridium difficile infection  No longer on antibiotics         Electronically signed by:  Gisela Meza DO        Sincerely,        Gisela Meza DO

## 2019-11-14 ENCOUNTER — NURSING HOME VISIT (OUTPATIENT)
Dept: GERIATRICS | Facility: CLINIC | Age: 84
End: 2019-11-14
Payer: MEDICARE

## 2019-11-14 VITALS
BODY MASS INDEX: 24.26 KG/M2 | OXYGEN SATURATION: 96 % | RESPIRATION RATE: 18 BRPM | WEIGHT: 123.6 LBS | HEART RATE: 65 BPM | TEMPERATURE: 97.9 F | SYSTOLIC BLOOD PRESSURE: 106 MMHG | DIASTOLIC BLOOD PRESSURE: 54 MMHG | HEIGHT: 60 IN

## 2019-11-14 DIAGNOSIS — Z86.19 HX OF CLOSTRIDIUM DIFFICILE INFECTION: ICD-10-CM

## 2019-11-14 DIAGNOSIS — Z87.19 HISTORY OF GI BLEED: ICD-10-CM

## 2019-11-14 DIAGNOSIS — S82.101D CLOSED FRACTURE OF PROXIMAL END OF RIGHT TIBIA AND FIBULA WITH ROUTINE HEALING, SUBSEQUENT ENCOUNTER: ICD-10-CM

## 2019-11-14 DIAGNOSIS — S82.831D CLOSED FRACTURE OF PROXIMAL END OF RIGHT TIBIA AND FIBULA WITH ROUTINE HEALING, SUBSEQUENT ENCOUNTER: ICD-10-CM

## 2019-11-14 DIAGNOSIS — D64.9 ANEMIA, UNSPECIFIED TYPE: ICD-10-CM

## 2019-11-14 DIAGNOSIS — Z98.890 S/P ORIF (OPEN REDUCTION INTERNAL FIXATION) FRACTURE: ICD-10-CM

## 2019-11-14 DIAGNOSIS — R09.89 LABILE HYPERTENSION: Primary | ICD-10-CM

## 2019-11-14 DIAGNOSIS — R41.89 COGNITIVE IMPAIRMENT: ICD-10-CM

## 2019-11-14 DIAGNOSIS — R53.81 PHYSICAL DECONDITIONING: ICD-10-CM

## 2019-11-14 DIAGNOSIS — Z87.81 S/P ORIF (OPEN REDUCTION INTERNAL FIXATION) FRACTURE: ICD-10-CM

## 2019-11-14 DIAGNOSIS — R35.0 URINARY FREQUENCY: ICD-10-CM

## 2019-11-14 PROCEDURE — 99309 SBSQ NF CARE MODERATE MDM 30: CPT | Performed by: NURSE PRACTITIONER

## 2019-11-14 RX ORDER — OXYCODONE HYDROCHLORIDE 5 MG/1
2.5 TABLET ORAL EVERY 6 HOURS PRN
Qty: 15 TABLET | Refills: 0 | Status: SHIPPED | OUTPATIENT
Start: 2019-11-14 | End: 2019-11-20

## 2019-11-14 RX ORDER — LISINOPRIL 5 MG/1
10 TABLET ORAL DAILY
Start: 2019-11-14 | End: 2019-11-20

## 2019-11-14 ASSESSMENT — MIFFLIN-ST. JEOR: SCORE: 907.15

## 2019-11-14 NOTE — PROGRESS NOTES
[facility]  [place] Medical Record Number:  [id number]  Place of Service where encounter took place:  [facility] ([facility] [[id number]]  Chief Complaint   Patient presents with     RECHECK       HPI:    Daya Ignacio  is a 89 year old (10/11/1930), who is being seen today for an episodic care visit.  HPI information obtained from: facility chart records, facility staff, patient report and [facility] chart review.    Brief Summary of Hospital/ TCU Course: Ms. Daya Ignacio is an 88 year old female with PMH of HTN, lumbar compression fractures, upper GI bleed, generalized anxiety disorder, recurrent ESBL UTI and c.diff infection. Patient returned to ED with fall after discharge from the hospital 12 hours earlier after being admitted for UTI for 6 days. Per hospital notes she was walking in her kitchen and became unwell, fell and instantly had pain in her right leg. S/p ORIF to right proximal tibia fracture on  8/28/19. Is NWB to RLE- should wear knee immobilizer at all times except for with bathing and ice. Developed leukocytosis, but suspect secondary to trauma. With multiple recurrent hospitalizations this past year for ESBL UTI and c.difficile. Completed c.diff taper 9/3. Discharged to TCU.               At TCU Ms. Ignacio was found to UTI from Klebsiella pneumoniae. Treated with 7 days of cipro (completed 9/21). While on antibiotics she received oral vanco prophy. Has had ongoing labile BPs. HTN with BP goal 130-160s/60-90 due to dizziness, falls, frail. Multiple medication changes have been made with little improvement on BP.     Today's concern is:  Ms. Ignacio was seen today for routine follow up at TCU. SBP frequently elevated in AM, with improvement throughout the day. Sometime has significant drops in BP anywhere up to a drop of 40-50 SBP points. Is asymptomatic. Denies headaches, vision changes, SOB, CP, dizziness/lightheadedness. Reports pain to right knee-  at time of visit rates 6/10- sometimes gets up to 8/10. Is using PRN oxycodone infrequently. Is making progress with therapy per PHYSICAL THERAPY. Has follow up with ortho next week. Therapy feels she will be ready to discharge early the following week depending on how appointment goes with ortho. She will be moving to long-term facility- family has already moved her in. No abdominal pain, nausea, denies constipation. No dysuria or trouble voiding.     Past Medical and Surgical History reviewed in Epic today.    MEDICATIONS:  Current Outpatient Medications   Medication Sig Dispense Refill     acetaminophen (TYLENOL) 500 MG tablet Take 2 tablets (1,000 mg) by mouth 3 times daily       amLODIPine (NORVASC) 10 MG tablet Take 0.5 tablets (5 mg) by mouth 2 times daily 90 tablet 1     aspirin (ASA) 325 MG EC tablet Take 1 tablet (325 mg) by mouth daily 30 tablet 0     Calcium Carbonate (CALCIUM 600 PO) Take 1 tablet by mouth 2 times daily        carvedilol (COREG) 25 MG tablet Take 12.5 mg by mouth every morning AND 25 MG PO QHS       Cholecalciferol (VITAMIN D3) 2000 UNITS CAPS Take 2,000 Units by mouth daily (with dinner)       cloNIDine (CATAPRES) 0.1 MG tablet TAKE ONE TABLET BY MOUTH TWO TIMES A  tablet 0     diclofenac (VOLTAREN) 1 % topical gel Apply 2 g topically 3 times daily Apply to right knee       docusate sodium (COLACE) 100 MG capsule Take 1 capsule (100 mg) by mouth At Bedtime       estradiol (ESTRACE) 0.1 MG/GM vaginal cream Place 2 g vaginally three times a week paraben-free 6 g 3     gabapentin (NEURONTIN) 100 MG capsule Take 2 capsules (200 mg) by mouth 2 times daily 120 capsule 0     isosorbide dinitrate (ISORDIL) 10 MG tablet Take 1 tablet (10 mg) by mouth 3 times daily 270 tablet 3     Lidocaine (LIDOCARE) 4 % Patch Apply to low back topically one time a day for pain and remove per schedule as needed.       lisinopril (PRINIVIL/ZESTRIL) 5 MG tablet Take 2 tablets (10 mg) by mouth daily        Multiple Vitamins-Minerals (PRESERVISION AREDS) CAPS Take 1 capsule by mouth 2 times daily        multivitamin w/minerals (THERA-VIT-M) tablet Take 1 tablet by mouth daily       omeprazole (PRILOSEC) 10 MG DR capsule Take 20 mg by mouth daily       ondansetron (ZOFRAN-ODT) 4 MG ODT tab Take 1 tablet (4 mg) by mouth every 6 hours as needed for nausea or vomiting       oxyCODONE (ROXICODONE) 5 MG tablet Take 0.5 tablets (2.5 mg) by mouth every 6 hours as needed for severe pain 15 tablet 0     polyethylene glycol (MIRALAX/GLYCOLAX) packet Take 17 g by mouth daily as needed for constipation       senna-docusate (SENOKOT-S/PERICOLACE) 8.6-50 MG tablet Take 2 tablets by mouth 2 times daily as needed for constipation       SENNA-docusate sodium (SENNA S) 8.6-50 MG tablet Take 1 tablet by mouth At Bedtime       sertraline (ZOLOFT) 100 MG tablet Take 1 tablet (100 mg) by mouth daily 90 tablet 1     vitamin C (ASCORBIC ACID) 500 MG tablet Take 500 mg by mouth daily       REVIEW OF SYSTEMS:  10 point ROS of systems including Constitutional, Eyes, Respiratory, Cardiovascular, Gastroenterology, Genitourinary, Integumentary, Musculoskeletal, Neurological, Psychiatric were all negative except for pertinent positives noted in my HPI.    Objective:  /54   Pulse 65   Temp 97.9  F (36.6  C)   Resp 18   Ht 1.524 m (5')   Wt 56.1 kg (123 lb 9.6 oz)   SpO2 96%   BMI 24.14 kg/m    Exam:    GENERAL APPEARANCE:  Alert, pleasant and cooperative, elderly female sitting in wheelchair at time of visit.  HEENT: normocephalic, moist mucous membranes, nose without drainage or crusting, hearing acuity: intact  RESP:  respiratory effort normal, no respiratory distress, Lung sounds clear, patient is on RA  CV: auscultation of heart done, rate and rhythm regular. no murmur, no rub or gallop. No peripheral edema  ABDOMEN: + bowel sounds, soft, nontender, no grimacing or guarding with palpation.  M/S:   Gait and station abnormal: uses  walker for ambulation; tenderness to right knee; has knee immobilizer on at time of visit, with ACE wrap underneath; able to move all extremities   NEURO: no facial asymmetry, no speech deficits and able to follow directions, moves all extremities symmetrically  PSYCH: insight, judgement, and memory baseline, affect and mood normal      Labs:   Labs done in SNF are in [facility]. Please refer to them using EPIC/Care Everywhere. and Recent labs in EPIC reviewed by me today.     ASSESSMENT/PLAN:  (R09.23) Labile hypertension  Comment: Acute on chronic, BP variable control with some substantial drops in BP- worse in AM  Plan: BP goals are ~130 -165/60 -90 mmHg.This is higher than ACC and AHA recommendations due to goals of care, risk for hypotension, risk of dizziness and falls and frailty. Also has been having significant drops in BP after receiving medications. Start lisinopril 10 mg daily. Continue amlodipine 5 mg BID, carvedilol 25 mg po at HS, carvedilol 12.5 mg in AM, imdur, and clonidine. VS per TCU policy. May need further titration of lisinopril, or consider adding diuretic if BP continues to remain elevated.      (S82.101D,  S82.831D) Closed fracture of proximal end of right tibia and fibula with routine healing, subsequent encounter    (Z96.7, Z87.81) S/p ORIF  Comment: Acute, reports ongoing pain - still needing oxycodone at times- not at goal  -S/p ORIF to right proximal tibia fracture on 8/28/19.   Plan: Continue scheduled tylenol TID. Continue oxycodone PRN- change to q6h PRN- to continue for shortest duration as possible. Continue voltaren gel TID. ASA for DVT prophy- will have nursing make note to ask next week at ortho follow up if ok to discontinue. Ok for TTWB and transition to WBAT over next 6 weeks. Ok to remove brace at rest and at night, may unlock brace for work with PT. Follow up with ortho as scheduled.    (D64.9) Anemia, unspecified type  Comment: Acute on chronic, hgb at baseline at last  "check-suspected to be due to recent surgery  Hemoglobin   Date Value Ref Range Status   11/02/2019 9.2 (A) 12.0 - 16.0 g/dL Final   09/17/2019 9.4 (A) 12.0 - 16.0 g/dL Final     -baseline 8-10  Plan: Hgb PRN. Monitor for s/sx of bleeding.     (Z87.19) History of GI bleed  Comment: With possible GI bleed in 6/2019-no blood in stool or dark stool documented  Plan: Continue PTA omeprazole. Hold fish oil while on ASA at TCU. Monitor     (R41.89) Cognitive impairment  Comment: Acute on chronic  -CPT  4.7/5.6 indicating per therapy notes that,  \"cognitively  may live alone with daily assist to monitor safety and solve new problems occurring through minor changes in environment (would benefit from assist higher level IADLs such as med management/finances.\"  --SLUMS 22/30  Plan: Nursing to provide supportive cares.  will discharge from TCU to [facility].     (R53.81) Physical deconditioning  Comment: Acute, secondary to recent hospitalization, medical conditions as above- progress with therapies slow- is able to TTWB with advancement to WBAT over next 5 weeks- making progress per therapy  Plan: Encourage participation in physical therapy/occupational therapy for strengthening and deconditioning. Discharge planning per their recommendation. Social work to assist with d/c planning.     (R35.0) Urinary frequency  Comment: Acute- appears to be chronic. No dysuria, fever or other symptoms of UTI  recently (PVRs checked while at TCU)  - completed cipro 9/21 for UTI with UC + >100,000 copies of Klebsiella pneumoniae.   Plan: Encourage hydration. Continue vaginal estradiol 3 times per week. Monitor     (Z86.19) Hx of Clostridium difficile infection  Comment: no reports of loose stools  -completed vanco taper 9/3  -completed 10 day course of prophy oral vanco on 9/25 while on antibiotics for UTI  Plan: Monitor for reoccurrence.     Other chronic issues reviewed:  (F41.1) Generalized anxiety disorder  Comment: chronic  Plan: Continue PTA " sertraline. Monitor      (Z87.81) History of compression fracture of spine  Comment: Chronic  Plan: Continue PTA lidocaine patch, gabapentin for pain. Monitor.      (D69.6) Thrombocytopenia (H)  Comment: Chronic, stable  -Baseline variable 90s-200s  Platelet Count   Date Value Ref Range Status   11/02/2019 184 140 - 440 thou/uL Final     Plan: CBC PRN. Monitor for s/sx of bleeding.            Electronically signed by:  KARTHIK Walter CNP

## 2019-11-14 NOTE — LETTER
11/14/2019        RE: Daya Ignacio  [address] [facility] [address]        [facility]  [place] [facility] Record Number:  [id number]  Place of Service where encounter took place:  [facility] ([facility]) [[id number]]  Chief Complaint   Patient presents with     RECHECK       HPI:    Daya Ignacio  is a 89 year old (10/11/1930), who is being seen today for an episodic care visit.  HPI information obtained from: facility chart records, facility staff, patient report and [facility] chart review.    Brief Summary of Hospital/ TCU Course: Ms. Daya Ignacio is an 88 year old female with PMH of HTN, lumbar compression fractures, upper GI bleed, generalized anxiety disorder, recurrent ESBL UTI and c.diff infection. Patient returned to ED with fall after discharge from the hospital 12 hours earlier after being admitted for UTI for 6 days. Per hospital notes she was walking in her kitchen and became unwell, fell and instantly had pain in her right leg. S/p ORIF to right proximal tibia fracture on  8/28/19. Is NWB to RLE- should wear knee immobilizer at all times except for with bathing and ice. Developed leukocytosis, but suspect secondary to trauma. With multiple recurrent hospitalizations this past year for ESBL UTI and c.difficile. Completed c.diff taper 9/3. Discharged to TCU.               At TCU Ms. Ignacio was found to UTI from Klebsiella pneumoniae. Treated with 7 days of cipro (completed 9/21). While on antibiotics she received oral vanco prophy. Has had ongoing labile BPs. HTN with BP goal 130-160s/60-90 due to dizziness, falls, frail. Multiple medication changes have been made with little improvement on BP.     Today's concern is:  Ms. Ignacio was seen today for routine follow up at TCU. SBP frequently elevated in AM, with improvement throughout the day. Sometime has significant drops in BP anywhere up to a drop of 40-50 SBP points. Is asymptomatic. Denies  headaches, vision changes, SOB, CP, dizziness/lightheadedness. Reports pain to right knee- at time of visit rates 6/10- sometimes gets up to 8/10. Is using PRN oxycodone infrequently. Is making progress with therapy per PHYSICAL THERAPY. Has follow up with ortho next week. Therapy feels she will be ready to discharge early the following week depending on how appointment goes with ortho. She will be moving to [facility] facility- family has already moved her in. No abdominal pain, nausea, denies constipation. No dysuria or trouble voiding.     Past Medical and Surgical History reviewed in Epic today.    MEDICATIONS:  Current Outpatient Medications   Medication Sig Dispense Refill     acetaminophen (TYLENOL) 500 MG tablet Take 2 tablets (1,000 mg) by mouth 3 times daily       amLODIPine (NORVASC) 10 MG tablet Take 0.5 tablets (5 mg) by mouth 2 times daily 90 tablet 1     aspirin (ASA) 325 MG EC tablet Take 1 tablet (325 mg) by mouth daily 30 tablet 0     Calcium Carbonate (CALCIUM 600 PO) Take 1 tablet by mouth 2 times daily        carvedilol (COREG) 25 MG tablet Take 12.5 mg by mouth every morning AND 25 MG PO QHS       Cholecalciferol (VITAMIN D3) 2000 UNITS CAPS Take 2,000 Units by mouth daily (with dinner)       cloNIDine (CATAPRES) 0.1 MG tablet TAKE ONE TABLET BY MOUTH TWO TIMES A  tablet 0     diclofenac (VOLTAREN) 1 % topical gel Apply 2 g topically 3 times daily Apply to right knee       docusate sodium (COLACE) 100 MG capsule Take 1 capsule (100 mg) by mouth At Bedtime       estradiol (ESTRACE) 0.1 MG/GM vaginal cream Place 2 g vaginally three times a week paraben-free 6 g 3     gabapentin (NEURONTIN) 100 MG capsule Take 2 capsules (200 mg) by mouth 2 times daily 120 capsule 0     isosorbide dinitrate (ISORDIL) 10 MG tablet Take 1 tablet (10 mg) by mouth 3 times daily 270 tablet 3     Lidocaine (LIDOCARE) 4 % Patch Apply to low back topically one time a day for pain and remove per schedule as needed.        lisinopril (PRINIVIL/ZESTRIL) 5 MG tablet Take 2 tablets (10 mg) by mouth daily       Multiple Vitamins-Minerals (PRESERVISION AREDS) CAPS Take 1 capsule by mouth 2 times daily        multivitamin w/minerals (THERA-VIT-M) tablet Take 1 tablet by mouth daily       omeprazole (PRILOSEC) 10 MG DR capsule Take 20 mg by mouth daily       ondansetron (ZOFRAN-ODT) 4 MG ODT tab Take 1 tablet (4 mg) by mouth every 6 hours as needed for nausea or vomiting       oxyCODONE (ROXICODONE) 5 MG tablet Take 0.5 tablets (2.5 mg) by mouth every 6 hours as needed for severe pain 15 tablet 0     polyethylene glycol (MIRALAX/GLYCOLAX) packet Take 17 g by mouth daily as needed for constipation       senna-docusate (SENOKOT-S/PERICOLACE) 8.6-50 MG tablet Take 2 tablets by mouth 2 times daily as needed for constipation       SENNA-docusate sodium (SENNA S) 8.6-50 MG tablet Take 1 tablet by mouth At Bedtime       sertraline (ZOLOFT) 100 MG tablet Take 1 tablet (100 mg) by mouth daily 90 tablet 1     vitamin C (ASCORBIC ACID) 500 MG tablet Take 500 mg by mouth daily       REVIEW OF SYSTEMS:  10 point ROS of systems including Constitutional, Eyes, Respiratory, Cardiovascular, Gastroenterology, Genitourinary, Integumentary, Musculoskeletal, Neurological, Psychiatric were all negative except for pertinent positives noted in my HPI.    Objective:  /54   Pulse 65   Temp 97.9  F (36.6  C)   Resp 18   Ht 1.524 m (5')   Wt 56.1 kg (123 lb 9.6 oz)   SpO2 96%   BMI 24.14 kg/m     Exam:    GENERAL APPEARANCE:  Alert, pleasant and cooperative, elderly female sitting in wheelchair at time of visit.  HEENT: normocephalic, moist mucous membranes, nose without drainage or crusting, hearing acuity: intact  RESP:  respiratory effort normal, no respiratory distress, Lung sounds clear, patient is on RA  CV: auscultation of heart done, rate and rhythm regular. no murmur, no rub or gallop. No peripheral edema  ABDOMEN: + bowel sounds, soft, nontender,  no grimacing or guarding with palpation.  M/S:   Gait and station abnormal: uses walker for ambulation; tenderness to right knee; has knee immobilizer on at time of visit, with ACE wrap underneath; able to move all extremities   NEURO: no facial asymmetry, no speech deficits and able to follow directions, moves all extremities symmetrically  PSYCH: insight, judgement, and memory baseline, affect and mood normal      Labs:   [facility]. Please refer to them using EPIC/Care Everywhere. and Recent labs in EPIC reviewed by me today.     ASSESSMENT/PLAN:  (R09.23) Labile hypertension  Comment: Acute on chronic, BP variable control with some substantial drops in BP- worse in AM  Plan: BP goals are ~130 -165/60 -90 mmHg.This is higher than ACC and AHA recommendations due to goals of care, risk for hypotension, risk of dizziness and falls and frailty. Also has been having significant drops in BP after receiving medications. Start lisinopril 10 mg daily. Continue amlodipine 5 mg BID,  carvedilol 25 mg po at HS, carvedilol 12.5 mg in AM, imdur, and clonidine. VS per TCU policy. May need further titration of lisinopril, or consider adding diuretic if BP continues to remain elevated.      (S82.101D,  S82.831D) Closed fracture of proximal end of right tibia and fibula with routine healing, subsequent encounter    (Z96.7, Z87.81) S/p ORIF  Comment: Acute, reports ongoing pain - still needing oxycodone at times- not at goal  -S/p ORIF to right proximal tibia fracture on 8/28/19.   Plan: Continue scheduled tylenol TID. Continue oxycodone PRN- change to q6h PRN- to continue for shortest duration as possible. Continue voltaren gel TID. ASA for DVT prophy- will have nursing make note to ask next week at ortho follow up if ok to discontinue. Ok for TTWB and transition to WBAT over next 6 weeks. Ok to remove brace at rest and at night, may unlock brace for work with PT. Follow up with ortho as  "scheduled.    (D64.9) Anemia, unspecified type  Comment: Acute on chronic, hgb at baseline at last check-suspected to be due to recent surgery  Hemoglobin   Date Value Ref Range Status   11/02/2019 9.2 (A) 12.0 - 16.0 g/dL Final   09/17/2019 9.4 (A) 12.0 - 16.0 g/dL Final     -baseline 8-10  Plan: Hgb PRN. Monitor for s/sx of bleeding.     (Z87.19) History of GI bleed  Comment: With possible GI bleed in 6/2019-no blood in stool or dark stool documented  Plan: Continue PTA omeprazole. Hold fish oil while on ASA at TCU. Monitor     (R41.89) Cognitive impairment  Comment: Acute on chronic  -CPT  4.7/5.6 indicating per therapy notes that,  \"cognitively  may live alone with daily assist to monitor safety and solve new problems occurring through minor changes in environment (would benefit from assist higher level IADLs such as med management/finances.\"  --SLUMS 22/30  Plan: Nursing to provide supportive cares.  will discharge from TCU to [facility].     (R53.81) Physical deconditioning  Comment: Acute, secondary to recent hospitalization, medical conditions as above- progress with therapies slow- is able to TTWB with advancement to WBAT over next 5 weeks- making progress per therapy  Plan: Encourage participation in physical therapy/occupational therapy for strengthening and deconditioning. Discharge planning per their recommendation. Social work to assist with d/c planning.     (R35.0) Urinary frequency  Comment: Acute- appears to be chronic. No dysuria, fever or other symptoms of UTI  recently (PVRs checked while at TCU)  - completed cipro 9/21 for UTI with UC + >100,000 copies of Klebsiella pneumoniae.   Plan: Encourage hydration. Continue vaginal estradiol 3 times per week. Monitor     (Z86.19) Hx of Clostridium difficile infection  Comment: no reports of loose stools  -completed vanco taper 9/3  -completed 10 day course of prophy oral vanco on 9/25 while on antibiotics for UTI  Plan: Monitor for reoccurrence.     Other " chronic issues reviewed:  (F41.1) Generalized anxiety disorder  Comment: chronic  Plan: Continue PTA sertraline. Monitor      (Z87.81) History of compression fracture of spine  Comment: Chronic  Plan: Continue PTA lidocaine patch, gabapentin for pain. Monitor.      (D69.6) Thrombocytopenia (H)  Comment: Chronic, stable  -Baseline variable 90s-200s  Platelet Count   Date Value Ref Range Status   11/02/2019 184 140 - 440 thou/uL Final     Plan: CBC PRN. Monitor for s/sx of bleeding.            Electronically signed by:  KARTHIK Walter CNP           Sincerely,        KARTHIK Walter CNP

## 2019-11-19 ENCOUNTER — RECORDS - HEALTHEAST (OUTPATIENT)
Dept: LAB | Facility: CLINIC | Age: 84
End: 2019-11-19

## 2019-11-19 ENCOUNTER — TRANSFERRED RECORDS (OUTPATIENT)
Dept: HEALTH INFORMATION MANAGEMENT | Facility: CLINIC | Age: 84
End: 2019-11-19

## 2019-11-20 ENCOUNTER — TRANSFERRED RECORDS (OUTPATIENT)
Dept: HEALTH INFORMATION MANAGEMENT | Facility: CLINIC | Age: 84
End: 2019-11-20

## 2019-11-20 ENCOUNTER — TELEPHONE (OUTPATIENT)
Dept: FAMILY MEDICINE | Facility: CLINIC | Age: 84
End: 2019-11-20

## 2019-11-20 ENCOUNTER — DISCHARGE SUMMARY NURSING HOME (OUTPATIENT)
Dept: GERIATRICS | Facility: CLINIC | Age: 84
End: 2019-11-20
Payer: MEDICARE

## 2019-11-20 VITALS
HEIGHT: 60 IN | BODY MASS INDEX: 24.46 KG/M2 | WEIGHT: 124.6 LBS | HEART RATE: 62 BPM | OXYGEN SATURATION: 94 % | TEMPERATURE: 97.8 F | SYSTOLIC BLOOD PRESSURE: 132 MMHG | RESPIRATION RATE: 18 BRPM | DIASTOLIC BLOOD PRESSURE: 71 MMHG

## 2019-11-20 DIAGNOSIS — R41.89 COGNITIVE IMPAIRMENT: ICD-10-CM

## 2019-11-20 DIAGNOSIS — Z87.19 HISTORY OF GI BLEED: ICD-10-CM

## 2019-11-20 DIAGNOSIS — R53.81 PHYSICAL DECONDITIONING: ICD-10-CM

## 2019-11-20 DIAGNOSIS — S82.101D CLOSED FRACTURE OF PROXIMAL END OF RIGHT TIBIA AND FIBULA WITH ROUTINE HEALING, SUBSEQUENT ENCOUNTER: ICD-10-CM

## 2019-11-20 DIAGNOSIS — R35.0 URINARY FREQUENCY: ICD-10-CM

## 2019-11-20 DIAGNOSIS — Z87.81 S/P ORIF (OPEN REDUCTION INTERNAL FIXATION) FRACTURE: ICD-10-CM

## 2019-11-20 DIAGNOSIS — D64.9 ANEMIA, UNSPECIFIED TYPE: ICD-10-CM

## 2019-11-20 DIAGNOSIS — S82.831D CLOSED FRACTURE OF PROXIMAL END OF RIGHT TIBIA AND FIBULA WITH ROUTINE HEALING, SUBSEQUENT ENCOUNTER: ICD-10-CM

## 2019-11-20 DIAGNOSIS — R09.89 LABILE HYPERTENSION: Primary | ICD-10-CM

## 2019-11-20 DIAGNOSIS — S82.201A TIBIA/FIBULA FRACTURE, RIGHT, CLOSED, INITIAL ENCOUNTER: ICD-10-CM

## 2019-11-20 DIAGNOSIS — Z98.890 S/P ORIF (OPEN REDUCTION INTERNAL FIXATION) FRACTURE: ICD-10-CM

## 2019-11-20 DIAGNOSIS — Z86.19 HX OF CLOSTRIDIUM DIFFICILE INFECTION: ICD-10-CM

## 2019-11-20 DIAGNOSIS — S82.401A TIBIA/FIBULA FRACTURE, RIGHT, CLOSED, INITIAL ENCOUNTER: ICD-10-CM

## 2019-11-20 LAB
ANION GAP SERPL CALCULATED.3IONS-SCNC: 7 MMOL/L (ref 5–18)
ANION GAP SERPL CALCULATED.3IONS-SCNC: 7 MMOL/L (ref 5–18)
BUN SERPL-MCNC: 22 MG/DL (ref 8–28)
BUN SERPL-MCNC: 22 MG/DL (ref 8–28)
CALCIUM SERPL-MCNC: 9.8 MG/DL (ref 8.5–10.5)
CALCIUM SERPL-MCNC: 9.8 MG/DL (ref 8.5–10.5)
CHLORIDE BLD-SCNC: 103 MMOL/L (ref 98–107)
CHLORIDE SERPLBLD-SCNC: 103 MMOL/L (ref 98–107)
CO2 SERPL-SCNC: 29 MMOL/L (ref 22–31)
CO2 SERPL-SCNC: 29 MMOL/L (ref 22–31)
CREAT SERPL-MCNC: 0.65 MG/DL (ref 0.6–1.1)
CREAT SERPL-MCNC: 0.65 MG/DL (ref 0.6–1.1)
GFR SERPL CREATININE-BSD FRML MDRD: >60 ML/MIN/1.73M2
GFR SERPL CREATININE-BSD FRML MDRD: >60 ML/MIN/1.73M2
GLUCOSE BLD-MCNC: 80 MG/DL (ref 70–125)
GLUCOSE SERPL-MCNC: 80 MG/DL (ref 70–125)
POTASSIUM BLD-SCNC: 3.8 MMOL/L (ref 3.5–5)
POTASSIUM SERPL-SCNC: 3.8 MMOL/L (ref 3.5–5)
SODIUM SERPL-SCNC: 139 MMOL/L (ref 136–145)
SODIUM SERPL-SCNC: 139 MMOL/L (ref 136–145)

## 2019-11-20 PROCEDURE — 99316 NF DSCHRG MGMT 30 MIN+: CPT | Performed by: NURSE PRACTITIONER

## 2019-11-20 RX ORDER — OXYCODONE HYDROCHLORIDE 5 MG/1
2.5 TABLET ORAL 2 TIMES DAILY PRN
Qty: 1 TABLET | Refills: 0 | Status: SHIPPED | OUTPATIENT
Start: 2019-11-20 | End: 2020-05-06

## 2019-11-20 RX ORDER — LISINOPRIL 5 MG/1
20 TABLET ORAL AT BEDTIME
Start: 2019-11-20 | End: 2020-05-06

## 2019-11-20 RX ORDER — ACETAMINOPHEN 325 MG/1
650 TABLET ORAL EVERY 6 HOURS PRN
Start: 2019-11-20 | End: 2020-05-06

## 2019-11-20 ASSESSMENT — MIFFLIN-ST. JEOR: SCORE: 911.68

## 2019-11-20 NOTE — LETTER
11/20/2019        RE: Daya Ignacio  [address]  [place] [address]        [facility] DISCHARGE SUMMARY  PATIENT'S NAME: Daya Ignacio  YOB: 1930  MEDICAL RECORD NUMBER:  [id number]  Place of Service where encounter took place:  [facility] ([facility]) [[id number]]    PRIMARY CARE PROVIDER AND CLINIC RESPONSIBLE AFTER TRANSFER:   Marcy Soares PA-C, [address] [facility] [address]    [facility] Provider     Transferring providers: KARTHIK Walter CNP, Gisela Meza MD  Recent Hospitalization/ED:  [facility] Hospital stay 8/27/19 to 8/30/19.  Date of SNF Admission: August / 30 / 2019  Date of SNF (anticipated) Discharge: November / 25 / 2019  Discharged to: new assisted living for patient [facility]  Cognitive Scores: BIMS: 13/15, SLUMS: 22/30 and CPT: 4.7/5.6  Physical Function: ambulating 270' with locked brace and 2ww, independent with ADLs and transfers    CODE STATUS/ADVANCE DIRECTIVES DISCUSSION:  DNR / DNI   ALLERGIES: Atorvastatin; Macrobid [nitrofurantoin anhydrous]; and Sulfa drugs    DISCHARGE DIAGNOSIS/NURSING FACILITY COURSE:   Brief Summary of Hospital/ TCU Course: Ms. Daya Ignacio is an 88 year old female with PMH of HTN, lumbar compression fractures, upper GI bleed, generalized anxiety disorder, recurrent ESBL UTI and c.diff infection. Patient returned to ED with fall after discharge from the hospital 12 hours earlier after being admitted for UTI for 6 days. Per hospital notes she was walking in her kitchen and became unwell, fell and instantly had pain in her right leg. S/p ORIF to right proximal tibia fracture on  8/28/19. Is NWB to RLE- should wear knee immobilizer at all times except for with bathing and ice. Developed leukocytosis, but suspect secondary to trauma. With multiple recurrent hospitalizations this past year for ESBL UTI and c.difficile. Completed c.diff taper  9/3. Discharged to TCU.               At TCU Ms. Ignacio was found to UTI from Klebsiella pneumoniae. Treated with 7 days of cipro (completed 9/21). While on antibiotics she received oral vanco prophy. Has had ongoing labile BPs. HTN with BP goal 130-160s/60-90 due to dizziness, falls, frail. Multiple medication changes have been made with only some improvement on BP.  She will need to follow up with PCP in 1-2 weeks for additional management after discharge from TCU. Recommend BMP and CBC be checked at that time. She has made progress with therapy which was slow secondary to weight bearing restrictions, which have now been lifted. She continues to wear her knee brace for comfort. She will continue therapy with home care at discharge. Continues to have pain to right knee, is agreeable to decreasing oxycodone to BID PRN- with goal to stop using soon. She was unaware that nursing have been giving it to her daily, or sometimes more. Denies SOB, CP, dizziness/lightheadedness, cough, dysuria or trouble urinating. Bowels moving regularly.      (R09.23) Labile hypertension  Comment: Acute on chronic, BP variable control with some substantial drops in BP at times- tends to be elevated in AM; -199 over past week  Plan: BP goals are ~130 -165/60 -90 mmHg.This is higher than ACC and AHA recommendations due to goals of care, risk for hypotension, risk of dizziness and falls and frailty. Also has been having significant drops in BP after receiving medications.  Increase lisinopril 20 mg qhs. Continue amlodipine 5 mg BID, carvedilol 25 mg po at HS, carvedilol 12.5 mg in AM, imdur, and clonidine. Likely will need further titration of lisinopril, or consider adding diuretic if BP continues to remain elevated. Would recommend recommend BMP at follow up with PCP     (S82.101D,  S82.831D) Closed fracture of proximal end of right tibia and fibula with routine healing, subsequent encounter    (Z96.7, Z87.81) S/p  "ORIF  Comment: Acute, reports ongoing pain - still needing oxycodone at times- not at goal  -S/p ORIF to right proximal tibia fracture on 8/28/19.   Plan: Change tylenol to q6h PRN. Change oxycodone to BID PRN- with goal to continue for shortest duration as possible. Continue voltaren gel TID. ASA for DVT prophy- left message to with Dr. Pardo's office to confirm ok to stop- awaiting callback -will remove from med list if confirmed ok to Discontinue. WBAT. Ok to remove brace at rest and at night, may unlock brace for work with PT. Follow up with ortho per their recommendations.     (D64.9) Anemia, unspecified type  Comment: Acute on chronic, hgb at baseline at last check-suspected to be due to recent surgery  Hemoglobin   Date Value Ref Range Status   11/02/2019 9.2 (A) 12.0 - 16.0 g/dL Final   09/17/2019 9.4 (A) 12.0 - 16.0 g/dL Final     -baseline 8-10  Plan: CBC at follow up with PCP. Monitor for s/sx of bleeding.     (Z87.19) History of GI bleed  Comment: With possible GI bleed in 6/2019-no blood in stool or dark stool documented  Plan: Continue PTA omeprazole. Hold fish oil while on ASA at TCU. As above will Discontinue ASA if hear back from [facility]     (R41.89) Cognitive impairment  Comment: Acute on chronic  -CPT  4.7/5.6 indicating per therapy notes that,  \"cognitively  may live alone with daily assist to monitor safety and solve new problems occurring through minor changes in environment (would benefit from assist higher level IADLs such as med management/finances.\"  --SLUMS 22/30  [facility] Home care services as below.      (R53.81) Physical deconditioning  Comment: Acute, secondary to recent hospitalization, medical conditions as above- made slow progress with therapy secondary to weight bearing limitations. Is completing ADLs independently, able to transfer independently, ambulating with walker as above  Plan: Continue with therapy through home care.      (R35.0) Urinary " frequency  Comment: Acute- appears to be chronic. No dysuria, fever or other symptoms of UTI  recently (PVRs checked while at TCU)  - completed cipro 9/21 for UTI with UC + >100,000 copies of Klebsiella pneumoniae.   Plan: Encourage hydration. Continue vaginal estradiol 3 times per week. Monitor     (Z86.19) Hx of Clostridium difficile infection  Comment: no reports of loose stools  -completed vanco taper 9/3  -completed 10 day course of prophy oral vanco on 9/25 while on antibiotics for UTI  Plan: Monitor for reoccurrence.      Other chronic issues reviewed:  (F41.1) Generalized anxiety disorder  Comment: chronic  Plan: Continue PTA sertraline. Monitor      (Z87.81) History of compression fracture of spine  Comment: Chronic  Plan: Continue PTA lidocaine patch, gabapentin for pain. Monitor.      (D69.6) Thrombocytopenia (H)  Comment: Chronic, stable  -Baseline variable 90s-200s        Platelet Count   Date Value Ref Range Status   11/02/2019 184 140 - 440 thou/uL Final      Plan: CBC PRN. Monitor for s/sx of bleeding.          Past Medical History:  has a past medical history of 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 (4/16/2014), h/o Clostridium difficile colitis (06/2019), Hypertension, Lumbar compression fracture (H), Macular degeneration (senile) of retina, unspecified, Upper GI bleed (06/2019), and Urinary tract infection due to extended-spectrum beta lactamase (ESBL)-producing Klebsiella (05/2019). She also has no past medical history of Complication of anesthesia or Malignant hyperthermia.    Discharge Medications:  Current Outpatient Medications   Medication Sig Dispense Refill     acetaminophen (TYLENOL) 325 MG tablet Take 2 tablets (650 mg) by mouth every 6 hours as needed for mild pain       amLODIPine (NORVASC) 10 MG tablet Take 0.5 tablets (5 mg) by mouth 2 times daily 90 tablet 1     aspirin (ASA) 325 MG EC tablet Take 1 tablet (325 mg) by mouth daily 30 tablet 0     Calcium Carbonate (CALCIUM 600  PO) Take 1 tablet by mouth 2 times daily        carvedilol (COREG) 25 MG tablet Take 12.5 mg by mouth every morning AND 25 MG PO QHS       Cholecalciferol (VITAMIN D3) 2000 UNITS CAPS Take 2,000 Units by mouth daily (with dinner)       cloNIDine (CATAPRES) 0.1 MG tablet TAKE ONE TABLET BY MOUTH TWO TIMES A  tablet 0     diclofenac (VOLTAREN) 1 % topical gel Apply 2 g topically 3 times daily Apply to right knee       docusate sodium (COLACE) 100 MG capsule Take 1 capsule (100 mg) by mouth At Bedtime       estradiol (ESTRACE) 0.1 MG/GM vaginal cream Place 2 g vaginally three times a week paraben-free 6 g 3     gabapentin (NEURONTIN) 100 MG capsule Take 2 capsules (200 mg) by mouth 2 times daily 120 capsule 0     isosorbide dinitrate (ISORDIL) 10 MG tablet Take 1 tablet (10 mg) by mouth 3 times daily 270 tablet 3     Lidocaine (LIDOCARE) 4 % Patch Apply to low back topically one time a day for pain and remove per schedule as needed.       lisinopril (PRINIVIL/ZESTRIL) 5 MG tablet Take 4 tablets (20 mg) by mouth At Bedtime       Multiple Vitamins-Minerals (PRESERVISION AREDS) CAPS Take 1 capsule by mouth 2 times daily        multivitamin w/minerals (THERA-VIT-M) tablet Take 1 tablet by mouth daily       omeprazole (PRILOSEC) 10 MG DR capsule Take 20 mg by mouth daily       ondansetron (ZOFRAN-ODT) 4 MG ODT tab Take 1 tablet (4 mg) by mouth every 6 hours as needed for nausea or vomiting       oxyCODONE (ROXICODONE) 5 MG tablet Take 0.5 tablets (2.5 mg) by mouth 2 times daily as needed for severe pain Patient has supply at [facility]- does not need more tabs dispensed 1 tablet 0     polyethylene glycol (MIRALAX/GLYCOLAX) packet Take 17 g by mouth daily as needed for constipation       senna-docusate (SENOKOT-S/PERICOLACE) 8.6-50 MG tablet Take 2 tablets by mouth 2 times daily as needed for constipation       SENNA-docusate sodium (SENNA S) 8.6-50 MG tablet Take 1 tablet by mouth At Bedtime       sertraline (ZOLOFT) 100 MG  tablet Take 1 tablet (100 mg) by mouth daily 90 tablet 1     vitamin C (ASCORBIC ACID) 500 MG tablet Take 500 mg by mouth daily         Controlled medications sent with patient:   Medication: oxycodone , 14 doses of 2.5 mg given to patient at the time of discharge to take home     ROS:   10 point ROS of systems including Constitutional, Eyes, Respiratory, Cardiovascular, Gastroenterology, Genitourinary, Integumentary, Musculoskeletal, Psychiatric were all negative except for pertinent positives noted in my HPI.    Physical Exam:   Vitals: /71   Pulse 62   Temp 97.8  F (36.6  C)   Resp 18   Ht 1.524 m (5')   Wt 56.5 kg (124 lb 9.6 oz)   SpO2 94%   BMI 24.33 kg/m     BMI= Body mass index is 24.33 kg/m .  GENERAL APPEARANCE:  Alert, pleasant and cooperative, elderly female sitting in wheelchair at time of visit.  HEENT: normocephalic, moist mucous membranes, nose without drainage or crusting, hearing acuity: intact  RESP:  respiratory effort normal, no respiratory distress, Lung sounds clear, patient is on RA  CV: auscultation of heart done, rate and rhythm regular. no murmur, no rub or gallop. No peripheral edema  ABDOMEN: + bowel sounds, soft, nontender, no grimacing or guarding with palpation.  M/S:   Gait and station abnormal: uses walker for ambulation; has knee immobilizer on at time of visit, with ACE wrap underneath; able to move all extremities   NEURO: no facial asymmetry, no speech deficits and able to follow directions, moves all extremities symmetrically  PSYCH: insight, judgement, and memory baseline, affect and mood normal    SNF labs: [facility] in [facility]. Please refer to them using [employer]/Care Everywhere. and Recent labs in EPIC reviewed by me today.       DISCHARGE PLAN:    Follow up labs: CBC and BMP recheck at follow up with PCP    Medical Follow Up:      Follow up with primary care provider in 1-2 weeks   Follow up with orthopedics per their recommendations    Discharge  Services: Home Care:  Physical Therapy, Registered Nurse, Home Health Aide and From:  [facility]      TOTAL DISCHARGE TIME:   Greater than 30 minutes  Electronically signed by:  KARTHIK Walter CNP     Home care Face to Face documentation done in EPIC attached to Home care orders for [facility].                 Sincerely,        KARTHIK Walter CNP

## 2019-11-20 NOTE — Clinical Note
Plan is for patient to discharge from TCU 11/25. She should follow up in 1-2 weeks with CBC and BMP at that time. Home care services have been arranged. Please let me know if you have any questions. Seema Quintero [facility]

## 2019-11-20 NOTE — PROGRESS NOTES
[facility] DISCHARGE SUMMARY  PATIENT'S NAME: Daya Ignacio  YOB: 1930  MEDICAL RECORD NUMBER:  [id number]  Place of Service where encounter took place:  [facility] [[id number]]    PRIMARY CARE PROVIDER AND CLINIC RESPONSIBLE AFTER TRANSFER:   Marcy Soares PA-C, [address] [facility] / [facility] [id number]    [facility] Provider     Transferring providers: KARTHIK Walter CNP, Gisela Meza MD  Recent Hospitalization/ED:  [facility] stay 8/27/19 to 8/30/19.  Date of SNF Admission: August / 30 / 2019  Date of SNF (anticipated) Discharge: November / 25 / 2019  Discharged to: new assisted living for patient [facility]  Cognitive Scores: BIMS: 13/15, SLUMS: 22/30 and CPT: 4.7/5.6  Physical Function: ambulating 270' with locked brace and 2ww, independent with ADLs and transfers    CODE STATUS/ADVANCE DIRECTIVES DISCUSSION:  DNR / DNI   ALLERGIES: Atorvastatin; Macrobid [nitrofurantoin anhydrous]; and Sulfa drugs    DISCHARGE DIAGNOSIS/NURSING FACILITY COURSE:   Brief Summary of Hospital/ TCU Course: Ms. Daya Ignacio is an 88 year old female with PMH of HTN, lumbar compression fractures, upper GI bleed, generalized anxiety disorder, recurrent ESBL UTI and c.diff infection. Patient returned to ED with fall after discharge from the hospital 12 hours earlier after being admitted for UTI for 6 days. Per hospital notes she was walking in her kitchen and became unwell, fell and instantly had pain in her right leg. S/p ORIF to right proximal tibia fracture on  8/28/19. Is NWB to RLE- should wear knee immobilizer at all times except for with bathing and ice. Developed leukocytosis, but suspect secondary to trauma. With multiple recurrent hospitalizations this past year for ESBL UTI and c.difficile. Completed c.diff taper 9/3. Discharged to TCU.               At TCU Ms. Ignacio was found to UTI from Klebsiella  pneumoniae. Treated with 7 days of cipro (completed 9/21). While on antibiotics she received oral vanco prophy. Has had ongoing labile BPs. HTN with BP goal 130-160s/60-90 due to dizziness, falls, frail. Multiple medication changes have been made with only some improvement on BP. She will need to follow up with PCP in 1-2 weeks for additional management after discharge from TCU. Recommend BMP and CBC be checked at that time. She has made progress with therapy which was slow secondary to weight bearing restrictions, which have now been lifted. She continues to wear her knee brace for comfort. She will continue therapy with home care at discharge. Continues to have pain to right knee, is agreeable to decreasing oxycodone to BID PRN- with goal to stop using soon. She was unaware that nursing have been giving it to her daily, or sometimes more. Denies SOB, CP, dizziness/lightheadedness, cough, dysuria or trouble urinating. Bowels moving regularly.      (R09.23) Labile hypertension  Comment: Acute on chronic, BP variable control with some substantial drops in BP at times- tends to be elevated in AM; -199 over past week  Plan: BP goals are ~130 -165/60 -90 mmHg.This is higher than ACC and AHA recommendations due to goals of care, risk for hypotension, risk of dizziness and falls and frailty. Also has been having significant drops in BP after receiving medications. Increase lisinopril 20 mg qhs. Continue amlodipine 5 mg BID, carvedilol 25 mg po at HS, carvedilol 12.5 mg in AM, imdur, and clonidine. Likely will need further titration of lisinopril, or consider adding diuretic if BP continues to remain elevated. Would recommend recommend BMP at follow up with PCP     (S82.101D,  S82.831D) Closed fracture of proximal end of right tibia and fibula with routine healing, subsequent encounter    (Z96.7, Z87.81) S/p ORIF  Comment: Acute, reports ongoing pain - still needing oxycodone at times- not at goal  -S/p ORIF to  "right proximal tibia fracture on 8/28/19.   Plan: Change tylenol to q6h PRN. Change oxycodone to BID PRN- with goal to continue for shortest duration as possible. Continue voltaren gel TID. ASA for DVT prophy- left message to with Dr. Pardo's office to confirm ok to stop- awaiting callback -will remove from med list if confirmed ok to Discontinue. WBAT. Ok to remove brace at rest and at night, may unlock brace for work with PT. Follow up with ortho per their recommendations.     (D64.9) Anemia, unspecified type  Comment: Acute on chronic, hgb at baseline at last check-suspected to be due to recent surgery  Hemoglobin   Date Value Ref Range Status   11/02/2019 9.2 (A) 12.0 - 16.0 g/dL Final   09/17/2019 9.4 (A) 12.0 - 16.0 g/dL Final     -baseline 8-10  Plan: CBC at follow up with PCP. Monitor for s/sx of bleeding.     (Z87.19) History of GI bleed  Comment: With possible GI bleed in 6/2019-no blood in stool or dark stool documented  Plan: Continue PTA omeprazole. Hold fish oil while on ASA at TCU. As above will Discontinue ASA if hear back from TCO     (R41.89) Cognitive impairment  Comment: Acute on chronic  -CPT  4.7/5.6 indicating per therapy notes that,  \"cognitively  may live alone with daily assist to monitor safety and solve new problems occurring through minor changes in environment (would benefit from assist higher level IADLs such as med management/finances.\"  --SLUMS 22/30  Plan: Discharging from TCU to [facility]. Home care services as below.      (R53.81) Physical deconditioning  Comment: Acute, secondary to recent hospitalization, medical conditions as above- made slow progress with therapy secondary to weight bearing limitations. Is completing ADLs independently, able to transfer independently, ambulating with walker as above  Plan: Continue with therapy through home care.      (R35.0) Urinary frequency  Comment: Acute- appears to be chronic. No dysuria, fever or other symptoms of UTI  recently (PVRs " checked while at TCU)  - completed cipro 9/21 for UTI with UC + >100,000 copies of Klebsiella pneumoniae.   Plan: Encourage hydration. Continue vaginal estradiol 3 times per week. Monitor     (Z86.19) Hx of Clostridium difficile infection  Comment: no reports of loose stools  -completed vanco taper 9/3  -completed 10 day course of prophy oral vanco on 9/25 while on antibiotics for UTI  Plan: Monitor for reoccurrence.      Other chronic issues reviewed:  (F41.1) Generalized anxiety disorder  Comment: chronic  Plan: Continue PTA sertraline. Monitor      (Z87.81) History of compression fracture of spine  Comment: Chronic  Plan: Continue PTA lidocaine patch, gabapentin for pain. Monitor.      (D69.6) Thrombocytopenia (H)  Comment: Chronic, stable  -Baseline variable 90s-200s        Platelet Count   Date Value Ref Range Status   11/02/2019 184 140 - 440 thou/uL Final      Plan: CBC PRN. Monitor for s/sx of bleeding.          Past Medical History:  has a past medical history of 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 (4/16/2014), h/o Clostridium difficile colitis (06/2019), Hypertension, Lumbar compression fracture (H), Macular degeneration (senile) of retina, unspecified, Upper GI bleed (06/2019), and Urinary tract infection due to extended-spectrum beta lactamase (ESBL)-producing Klebsiella (05/2019). She also has no past medical history of Complication of anesthesia or Malignant hyperthermia.    Discharge Medications:  Current Outpatient Medications   Medication Sig Dispense Refill     acetaminophen (TYLENOL) 325 MG tablet Take 2 tablets (650 mg) by mouth every 6 hours as needed for mild pain       amLODIPine (NORVASC) 10 MG tablet Take 0.5 tablets (5 mg) by mouth 2 times daily 90 tablet 1     aspirin (ASA) 325 MG EC tablet Take 1 tablet (325 mg) by mouth daily 30 tablet 0     Calcium Carbonate (CALCIUM 600 PO) Take 1 tablet by mouth 2 times daily        carvedilol (COREG) 25 MG tablet Take 12.5 mg by mouth every  morning AND 25 MG PO QHS       Cholecalciferol (VITAMIN D3) 2000 UNITS CAPS Take 2,000 Units by mouth daily (with dinner)       cloNIDine (CATAPRES) 0.1 MG tablet TAKE ONE TABLET BY MOUTH TWO TIMES A  tablet 0     diclofenac (VOLTAREN) 1 % topical gel Apply 2 g topically 3 times daily Apply to right knee       docusate sodium (COLACE) 100 MG capsule Take 1 capsule (100 mg) by mouth At Bedtime       estradiol (ESTRACE) 0.1 MG/GM vaginal cream Place 2 g vaginally three times a week paraben-free 6 g 3     gabapentin (NEURONTIN) 100 MG capsule Take 2 capsules (200 mg) by mouth 2 times daily 120 capsule 0     isosorbide dinitrate (ISORDIL) 10 MG tablet Take 1 tablet (10 mg) by mouth 3 times daily 270 tablet 3     Lidocaine (LIDOCARE) 4 % Patch Apply to low back topically one time a day for pain and remove per schedule as needed.       lisinopril (PRINIVIL/ZESTRIL) 5 MG tablet Take 4 tablets (20 mg) by mouth At Bedtime       Multiple Vitamins-Minerals (PRESERVISION AREDS) CAPS Take 1 capsule by mouth 2 times daily        multivitamin w/minerals (THERA-VIT-M) tablet Take 1 tablet by mouth daily       omeprazole (PRILOSEC) 10 MG DR capsule Take 20 mg by mouth daily       ondansetron (ZOFRAN-ODT) 4 MG ODT tab Take 1 tablet (4 mg) by mouth every 6 hours as needed for nausea or vomiting       oxyCODONE (ROXICODONE) 5 MG tablet Take 0.5 tablets (2.5 mg) by mouth 2 times daily as needed for severe pain Patient has supply at [facility]- does not need more tabs dispensed 1 tablet 0     polyethylene glycol (MIRALAX/GLYCOLAX) packet Take 17 g by mouth daily as needed for constipation       senna-docusate (SENOKOT-S/PERICOLACE) 8.6-50 MG tablet Take 2 tablets by mouth 2 times daily as needed for constipation       SENNA-docusate sodium (SENNA S) 8.6-50 MG tablet Take 1 tablet by mouth At Bedtime       sertraline (ZOLOFT) 100 MG tablet Take 1 tablet (100 mg) by mouth daily 90 tablet 1     vitamin C (ASCORBIC ACID) 500 MG tablet Take  500 mg by mouth daily         Controlled medications sent with patient:   Medication: oxycodone , 14 doses of 2.5 mg given to patient at the time of discharge to take home     ROS:   10 point ROS of systems including Constitutional, Eyes, Respiratory, Cardiovascular, Gastroenterology, Genitourinary, Integumentary, Musculoskeletal, Psychiatric were all negative except for pertinent positives noted in my HPI.    Physical Exam:   Vitals: /71   Pulse 62   Temp 97.8  F (36.6  C)   Resp 18   Ht 1.524 m (5')   Wt 56.5 kg (124 lb 9.6 oz)   SpO2 94%   BMI 24.33 kg/m    BMI= Body mass index is 24.33 kg/m .  GENERAL APPEARANCE:  Alert, pleasant and cooperative, elderly female sitting in wheelchair at time of visit.  HEENT: normocephalic, moist mucous membranes, nose without drainage or crusting, hearing acuity: intact  RESP:  respiratory effort normal, no respiratory distress, Lung sounds clear, patient is on RA  CV: auscultation of heart done, rate and rhythm regular. no murmur, no rub or gallop. No peripheral edema  ABDOMEN: + bowel sounds, soft, nontender, no grimacing or guarding with palpation.  M/S:   Gait and station abnormal: uses walker for ambulation; has knee immobilizer on at time of visit, with ACE wrap underneath; able to move all extremities   NEURO: no facial asymmetry, no speech deficits and able to follow directions, moves all extremities symmetrically  PSYCH: insight, judgement, and memory baseline, affect and mood normal    SNF labs: Labs done in SNF are in [facility]. Please refer to them using [employer] Everywhere. and Recent labs in [facility] reviewed by me today.       DISCHARGE PLAN:    Follow up labs: CBC and BMP recheck at follow up with PCP    Medical Follow Up:      Follow up with primary care provider in 1-2 weeks   Follow up with orthopedics per their recommendations    Discharge Services: Home Care:  Physical Therapy, Registered Nurse, Home Health Aide and From:  [facility]  Care      TOTAL DISCHARGE TIME:   Greater than 30 minutes  Electronically signed by:  KARTHIK Walter CNP     Home care Face to Face documentation done in EPIC attached to Home care orders for [facility].

## 2019-11-20 NOTE — PATIENT INSTRUCTIONS
DISCHARGE PLAN:    Follow up labs: CBC and BMP recheck at follow up with PCP    Medical Follow Up:                 Follow up with primary care provider in 1-2 weeks              Follow up with orthopedics per their recommendations    Discharge Services: Home Care:  Physical Therapy, Registered Nurse, Home Health Aide and From:  [facility]

## 2019-11-21 ENCOUNTER — PATIENT OUTREACH (OUTPATIENT)
Dept: CARE COORDINATION | Facility: CLINIC | Age: 84
End: 2019-11-21

## 2019-11-21 NOTE — PROGRESS NOTES
Clinic Care Coordination Contact  Care Team Conversations    Received call from TCU that patient will be discharging on Monday 11/25 to [facility] living. RNCC will attempt to complete outreach within 1-2 business days of discharge. Patient will need a Primary Care Provider follow up appointment.     Elizabeth Simmons RN Care Coordinator  [facility]  Email: [email or web address]  Phone: [phone]

## 2019-11-26 ENCOUNTER — TELEPHONE (OUTPATIENT)
Dept: FAMILY MEDICINE | Facility: CLINIC | Age: 84
End: 2019-11-26
Payer: MEDICARE

## 2019-11-26 ENCOUNTER — PATIENT OUTREACH (OUTPATIENT)
Dept: CARE COORDINATION | Facility: CLINIC | Age: 84
End: 2019-11-26

## 2019-11-26 NOTE — PROGRESS NOTES
Clinic Care Coordination Contact  [facility]/Voicemail     RNCC following patient with utilization goal of preventing hospital readmission within the next 30 days. Patient was discharged from TCU yesterday to [facility] assisted living with home care services. No Primary Care Provider appointment currently scheduled.   Clinical Data: Care Coordinator Outreach  Outreach attempted x 1.  Left message on patient's voicemail with call back information and requested return call.  Plan: Care Coordinator sent care coordination introduction letter on 07/24/2019 via mail. Care Coordinator will try to reach patient again in 1-2 business days.    Elizabeth Simmons RN Care Coordinator  [facility]  Email: [email or web address]  Phone: [phone]

## 2019-11-27 NOTE — PROGRESS NOTES
Clinic Care Coordination Contact    Follow Up Progress Note      Assessment: Patient reports she is doing good in assisted living. Really nice place. Staff help assist her with her foot brace and manage her medications. Son provides transportation and would need to check with him what his schedule is prior to scheduling a Primary Care Provider follow up appointment. Patient provided verbal consent to communicate with son. Patient reports home care has not yet contacted her. Has lifeline.     Goals addressed this encounter:   Goals Addressed                 This Visit's Progress       Patient Stated      1. Utilization (pt-stated)        Goal Statement: I want to remain free of re-hospitalization for at least the next 30 days   Measure of Success: Evidenced by no recurrence of hospitalization in the next 30 days  Supportive Steps to Achieve:   I will participate in Home Health Care therapy for strength and endurance  I will attend my post hospital follow up appointment  RN CC will offer ongoing check in and support; connection to additional community resources if requested  Barriers: Lives alone  Strengths: Motivated to participate in home health care, has a post-hospital follow up scheduled  Date to Achieve By: 02/2020    As of today's date 11/27/2019 goal is met at 0 - 25%.   Goal Status:  Active              Intervention/Education provided during outreach: Left message for son with call back information and requested return call back to schedule Primary Care Provider follow up appointment. Contacted [facility], spoke with Dandy. Referral sent to [facility]. # [phone]. Called [facility]. Spoke with Janett, transferred to [facility]. Confirmed patient was opened to home care services as of Tuesday 11/26. SN completed eval. Will also have PT, OT, and HHA.      Outreach Frequency: monthly    Plan:   Patient was encouraged to call RNCC with questions, concerns, support needs. RNCC will be available as  needed. Patient will talk with son about scheduling Primary Care Provider follow up appointment. RNCC will await return call back from son Gael.   Care Coordinator will follow up next month.     Elizabeth Simmons RN Care Coordinator  [facility]  Email: [email or web address]  Phone: [phone]

## 2019-11-30 ENCOUNTER — RECORDS - HEALTHEAST (OUTPATIENT)
Dept: LAB | Facility: CLINIC | Age: 84
End: 2019-11-30

## 2019-11-30 LAB
ALBUMIN UR-MCNC: NEGATIVE MG/DL
AMORPH CRY #/AREA URNS HPF: ABNORMAL /[HPF]
APPEARANCE UR: ABNORMAL
BACTERIA #/AREA URNS HPF: ABNORMAL HPF
BILIRUB UR QL STRIP: NEGATIVE
COLOR UR AUTO: YELLOW
GLUCOSE UR STRIP-MCNC: NEGATIVE MG/DL
HGB UR QL STRIP: NEGATIVE
KETONES UR STRIP-MCNC: NEGATIVE MG/DL
LEUKOCYTE ESTERASE UR QL STRIP: ABNORMAL
NITRATE UR QL: POSITIVE
PH UR STRIP: 7.5 [PH] (ref 4.5–8)
RBC #/AREA URNS AUTO: ABNORMAL HPF
SP GR UR STRIP: 1.01 (ref 1–1.03)
SQUAMOUS #/AREA URNS AUTO: ABNORMAL LPF
TRANS CELLS #/AREA URNS HPF: ABNORMAL LPF
UROBILINOGEN UR STRIP-ACNC: ABNORMAL
WBC #/AREA URNS AUTO: ABNORMAL HPF

## 2019-12-03 LAB
BACTERIA SPEC CULT: ABNORMAL
BACTERIA SPEC CULT: ABNORMAL

## 2019-12-10 ENCOUNTER — RECORDS - HEALTHEAST (OUTPATIENT)
Dept: LAB | Facility: CLINIC | Age: 84
End: 2019-12-10

## 2019-12-10 LAB
ALBUMIN SERPL-MCNC: 4 G/DL (ref 3.5–5)
ALP SERPL-CCNC: 99 U/L (ref 45–120)
ALT SERPL W P-5'-P-CCNC: 10 U/L (ref 0–45)
ANION GAP SERPL CALCULATED.3IONS-SCNC: 6 MMOL/L (ref 5–18)
AST SERPL W P-5'-P-CCNC: 14 U/L (ref 0–40)
BILIRUB SERPL-MCNC: 1 MG/DL (ref 0–1)
BUN SERPL-MCNC: 16 MG/DL (ref 8–28)
CALCIUM SERPL-MCNC: 10.3 MG/DL (ref 8.5–10.5)
CHLORIDE BLD-SCNC: 100 MMOL/L (ref 98–107)
CO2 SERPL-SCNC: 30 MMOL/L (ref 22–31)
CREAT SERPL-MCNC: 0.76 MG/DL (ref 0.6–1.1)
ERYTHROCYTE [DISTWIDTH] IN BLOOD BY AUTOMATED COUNT: 13.6 % (ref 11–14.5)
GFR SERPL CREATININE-BSD FRML MDRD: >60 ML/MIN/1.73M2
GLUCOSE BLD-MCNC: 115 MG/DL (ref 70–125)
HCT VFR BLD AUTO: 38.4 % (ref 35–47)
HGB BLD-MCNC: 12.3 G/DL (ref 12–16)
MCH RBC QN AUTO: 30.6 PG (ref 27–34)
MCHC RBC AUTO-ENTMCNC: 32 G/DL (ref 32–36)
MCV RBC AUTO: 96 FL (ref 80–100)
PLATELET # BLD AUTO: 236 THOU/UL (ref 140–440)
PMV BLD AUTO: 10.4 FL (ref 8.5–12.5)
POTASSIUM BLD-SCNC: 4.1 MMOL/L (ref 3.5–5)
PROT SERPL-MCNC: 7.6 G/DL (ref 6–8)
RBC # BLD AUTO: 4.02 MILL/UL (ref 3.8–5.4)
SODIUM SERPL-SCNC: 136 MMOL/L (ref 136–145)
TSH SERPL DL<=0.005 MIU/L-ACNC: 0.79 UIU/ML (ref 0.3–5)
VIT B12 SERPL-MCNC: 789 PG/ML (ref 213–816)
WBC: 5.7 THOU/UL (ref 4–11)

## 2019-12-11 LAB — 25(OH)D3 SERPL-MCNC: 36.9 NG/ML (ref 30–80)

## 2019-12-12 ENCOUNTER — RECORDS - HEALTHEAST (OUTPATIENT)
Dept: LAB | Facility: CLINIC | Age: 84
End: 2019-12-12

## 2019-12-12 LAB
ALBUMIN UR-MCNC: ABNORMAL MG/DL
AMORPH CRY #/AREA URNS HPF: ABNORMAL /[HPF]
APPEARANCE UR: ABNORMAL
BACTERIA #/AREA URNS HPF: ABNORMAL HPF
BILIRUB UR QL STRIP: NEGATIVE
COLOR UR AUTO: YELLOW
GLUCOSE UR STRIP-MCNC: NEGATIVE MG/DL
HGB UR QL STRIP: NEGATIVE
KETONES UR STRIP-MCNC: NEGATIVE MG/DL
LEUKOCYTE ESTERASE UR QL STRIP: ABNORMAL
NITRATE UR QL: NEGATIVE
PH UR STRIP: 8 [PH] (ref 4.5–8)
RBC #/AREA URNS AUTO: ABNORMAL HPF
SP GR UR STRIP: 1.02 (ref 1–1.03)
SQUAMOUS #/AREA URNS AUTO: ABNORMAL LPF
TRANS CELLS #/AREA URNS HPF: ABNORMAL LPF
TRI-PHOS CRY #/AREA URNS HPF: PRESENT /[HPF]
UROBILINOGEN UR STRIP-ACNC: ABNORMAL
WBC #/AREA URNS AUTO: ABNORMAL HPF

## 2019-12-12 NOTE — LETTER
[facility]   To:             Please give to facility    From:   Elizabeth Simmons RN  Care Coordinator   [facility]   P: [phone]  [email or web address]   Patient Name:  Daya Ignacio YOB: 1930   Admit date: 08/30/2019      *Information Needed:  Please contact me when the patient will discharge (or if they will move to long term care)- include the discharge date, disposition, and main diagnosis   - If the patient is discharged with home care services, please provide the name of the agency    Also- Please inform me if a care conference is being held.   Phone, Fax or Email with information                    Thank you                       Yes

## 2019-12-14 LAB — BACTERIA SPEC CULT: ABNORMAL

## 2019-12-27 ENCOUNTER — PATIENT OUTREACH (OUTPATIENT)
Dept: CARE COORDINATION | Facility: CLINIC | Age: 84
End: 2019-12-27

## 2019-12-27 NOTE — PROGRESS NOTES
Clinic Care Coordination Contact  [facility]/Voicemail     CCRN working with patient on utilization goal. Patient still has not scheduled TCU discharge follow up appointment with Primary Care Provider since last outreach.   Clinical Data: Care Coordinator Outreach  Outreach attempted x 1.  Left message on patient's voicemail with call back information and requested return call.  Plan: Care Coordinator sent care coordination introduction letter on 07/24/2019 via mail. Care Coordinator will try to reach patient again in 10 business days.    Elizabeth Simmons RN Care Coordinator  [facility]  Email: [email or web address]  Phone: [phone]

## 2020-01-07 ENCOUNTER — TRANSFERRED RECORDS (OUTPATIENT)
Dept: HEALTH INFORMATION MANAGEMENT | Facility: CLINIC | Age: 85
End: 2020-01-07

## 2020-01-14 ENCOUNTER — MYC MEDICAL ADVICE (OUTPATIENT)
Dept: FAMILY MEDICINE | Facility: CLINIC | Age: 85
End: 2020-01-14

## 2020-01-15 ENCOUNTER — PATIENT OUTREACH (OUTPATIENT)
Dept: CARE COORDINATION | Facility: CLINIC | Age: 85
End: 2020-01-15

## 2020-01-15 NOTE — PROGRESS NOTES
Clinic Care Coordination Contact    Situation: Patient chart reviewed by care coordinator.    Background: CCRN following patient with utilization goal in reduction of hospital visits.     Assessment: Patient's Primary Care Provider changed to provider with [facility] physicians who comes to her assisted living.     Plan/Recommendations: Care Team updated. No further outreaches scheduled.     Elizabeth Simmons RN Care Coordinator  [facility]  Email: [email or web address]  Phone: [phone]

## 2020-02-23 ENCOUNTER — HEALTH MAINTENANCE LETTER (OUTPATIENT)
Age: 85
End: 2020-02-23

## 2020-03-06 NOTE — PLAN OF CARE
Discharge Planner PT PT: PT evaluation only completed. Pt admitted with diarrhea ( C diff), and back pain ( L2 end plate fx), recently here with UTI. Pt lives in an apartment, elevator access. Pt reports she had 1 PT home visit PTA for back pain. Pt indep in ADL, does her med's, pt does her own cooking/facility bus takes her. Nurse checks vitals.  Patient plan for discharge: home  Current status: Significant decrease in back pain, rated 2/10 today at rest and w/ activity. Pt feels pain medd's and Aqua K pad have helped, diarrhea has subsided as well.  Indep bed mobility, Mod I tranfers with fww, ambulating w/ fww 260' with smooth,continuous pattern, good pace, no KARLA or instability.   Barriers to return to prior living situation: none  Recommendations for discharge: home. Pt should use fww at home. She started home PT PTA and had 1 visit. She can continue with home PT if needed.   Rationale for recommendations:  Anticipate patient will progress well given PLOF and current functional status. With continued home PT intervention as needed and ongoing medical management, anticipate pt will be able to  safely discharge home.           Entered by: Luz Mayo 06/23/2019 10:56 AM        Fluids: s/p __  cc/hr, encourage PO intake   Electrolytes-replete as needed  Nutrition - DASH/TLC  Code- Full Code  DVT ppx: Lovenox  GI ppx: none needed  DIspo: [facility]

## 2020-03-16 ENCOUNTER — RECORDS - HEALTHEAST (OUTPATIENT)
Dept: LAB | Facility: CLINIC | Age: 85
End: 2020-03-16

## 2020-03-16 LAB
ALBUMIN UR-MCNC: ABNORMAL MG/DL
AMORPH CRY #/AREA URNS HPF: ABNORMAL /[HPF]
APPEARANCE UR: ABNORMAL
BACTERIA #/AREA URNS HPF: ABNORMAL HPF
BILIRUB UR QL STRIP: NEGATIVE
COLOR UR AUTO: YELLOW
GLUCOSE UR STRIP-MCNC: NEGATIVE MG/DL
HGB UR QL STRIP: NEGATIVE
KETONES UR STRIP-MCNC: NEGATIVE MG/DL
LEUKOCYTE ESTERASE UR QL STRIP: ABNORMAL
MUCOUS THREADS #/AREA URNS LPF: ABNORMAL LPF
NITRATE UR QL: NEGATIVE
PH UR STRIP: 8 [PH] (ref 4.5–8)
RBC #/AREA URNS AUTO: ABNORMAL HPF
SP GR UR STRIP: 1.02 (ref 1–1.03)
SQUAMOUS #/AREA URNS AUTO: ABNORMAL LPF
TRI-PHOS CRY #/AREA URNS HPF: PRESENT /[HPF]
UROBILINOGEN UR STRIP-ACNC: ABNORMAL
WBC #/AREA URNS AUTO: >100 HPF
WBC CLUMPS #/AREA URNS HPF: PRESENT /[HPF]

## 2020-03-18 LAB — BACTERIA SPEC CULT: ABNORMAL

## 2020-03-29 NOTE — ED PROVIDER NOTES
History     Chief Complaint:  Generalized Weakness      HPI   Daya Ignacio is a 86 year old female who presents with generalized weakness and diarrhea. The patient is taking Cipro for a UTI. The patient was hospitalized from 6/19-6/21 secondary to a fall and subsequent nondisplaced left greater trochanteric hip fracture. Since discharge yesterday, she has been experiencing diarrhea which she describes as brown and mostly water. She has had diarrhea 5-6 times and vomited 3 times within the past 24 hours. She also reports having intermittent lower abdominal pain which also started yesterday. She describes having generalized weakness all over her body which is not localized to any area. She is unable to walk secondary to her general weakness. She reports being lightheaded but not dizzy and denies any fever. The patient denies any dizziness, persistent hip pain or any other symptoms.     Allergies:  Atorvastatin  Macrobid [Nitrofurantoin Anhydrous]  Penicillins  Sulfa Drugs     Medications:    HYDROcodone-acetaminophen (NORCO) 5-325 MG per tablet  hydrOXYzine (ATARAX) 25 MG tablet  ciprofloxacin (CIPRO) 250 MG tablet  nystatin (MYCOSTATIN) 711378 UNIT/ML suspension  estradiol (ESTRACE) 0.1 MG/GM cream  Cholecalciferol (VITAMIN D3) 2000 UNITS CAPS  Multiple Vitamins-Minerals (PRESERVISION AREDS) CAPS  cyanocobalamin (VITAMIN  B-12) 1000 MCG tablet  metoprolol (TOPROL-XL) 100 MG 24 hr tablet  aspirin EC 81 MG EC tablet  isosorbide dinitrate (ISORDIL) 10 MG tablet  amLODIPine (NORVASC) 10 MG tablet  gabapentin (NEURONTIN) 300 MG capsule  sertraline (ZOLOFT) 50 MG tablet  lisinopril (PRINIVIL/ZESTRIL) 40 MG tablet  cloNIDine (CATAPRES) 0.1 MG tablet    Past Medical History:    Retinal vein occlusion, right  Hip fracture  Vertebral column fracture  Hypertension   Macular degeneration   Sciatica   Osteoporosis   Anxiety   NSTEMI    Past Surgical History:    Orthopedic surgery     Family History:    Mother -  "dementia  Father - MI  Brother - Parkinson's     Social History:  Marital Status:  Single  Smoking status: Never smoker  Alcohol use: No      Review of Systems   Constitutional: Positive for appetite change. Negative for fever.   Gastrointestinal: Positive for abdominal pain, diarrhea, nausea and vomiting. Negative for blood in stool.   Genitourinary: Positive for frequency.   Musculoskeletal: Negative for back pain.   Neurological: Positive for weakness (generalized) and light-headedness. Negative for dizziness.   All other systems reviewed and are negative.      Physical Exam     Patient Vitals for the past 24 hrs:   BP Temp Temp src Pulse Heart Rate Resp SpO2 Height Weight   06/22/17 1704 (!) 107/33 100.2  F (37.9  C) Oral - 71 20 94 % - -   06/22/17 1530 157/67 - - - - - 94 % - -   06/22/17 1515 158/59 - - - - - - - -   06/22/17 1500 153/70 - - - - - 97 % - -   06/22/17 1445 (!) 127/101 - - - - - 95 % - -   06/22/17 1430 (!) 125/103 - - - - - - - -   06/22/17 1415 153/69 - - - - - - - -   06/22/17 1400 156/66 - - - - - 92 % - -   06/22/17 1345 111/65 - - - - - 90 % - -   06/22/17 1330 131/59 - - - - - 92 % - -   06/22/17 1315 127/59 - - - - - 96 % - -   06/22/17 1300 136/55 - - - - - 91 % - -   06/22/17 1230 124/53 - - - - - - - -   06/22/17 1215 152/61 - - - - - - - -   06/22/17 1200 132/59 - - - - - 94 % - -   06/22/17 1145 122/53 - - - - - - - -   06/22/17 1130 105/46 - - - - - 95 % - -   06/22/17 1107 101/47 98.4  F (36.9  C) Oral 65 65 16 94 % 1.6 m (5' 3\") 56.7 kg (125 lb)      Physical Exam  Constitutional: Alert, attentive  HENT:    Nose: Nose normal.    Mouth/Throat: Oropharynx is clear, mucous membranes are moist  Eyes: EOM are normal. Pupils are equal, round, and reactive to light.   CV: Regular rate and rhythm  Chest: Effort normal and breath sounds normal.   GI: No distension. There is no tenderness  MSK: Normal range of motion.   Neurological:   GCS 15; A/Ox3; Cranial nerves 2-12 intact;   5/5 " strength throughout the upper and lower extremities;   sensation intact to light touch throughout the upper and lower extremities;   normal fine motor coordination intact bilaterally;   No meningismus   Skin: Skin is warm and dry.     Emergency Department Course   ECG (11:13:40):  Rate 64 bpm. PA interval 152ms. QRS duration 82ms. QT/QTc 426/439. P-R-T axes 66, -24, -6. Normal sinus rhythm. Minimal voltage criteria for LVH may be normal variant. Possible anterior infarct, age undermined. Abnormal ECG. Interpreted at 1207 by Dr. Bill Mendez    Laboratory:  (8938) ISTAT gases lactate venous POCT: lactic acid 1.6, pH 7.38, pCO2 39 (L), pO2 17 (L), bicarbonate 23, O2 saturation 23     CBC: WBC 16.3 (H), HGB 10.1 (L),  (L)  BMP: Glucose 127 (H), Bun 40 (H), GFR 47 (L), Creatinine 1.10 (H), otherwise WNL  (1129) Troponin:  <0.015    UA: Soo, slightly cloudy urine, few bacteria, mucous present, 17 hyaline casts, o/w Negative    C diff: pending  Enteric bacteria and Virus panel: pending    Emergency Department Course:  Nursing notes and vitals reviewed.  (1205) I performed an exam of the patient as documented above.     EKG was done, interpretation as above.  Blood was drawn from the patient. This was sent for laboratory testing, findings above.   Stool sample was obtained and sent for laboratory analysis, findings above.  Urine sample was obtained and sent for laboratory analysis, findings above.    (9441) Findings and plan explained to the Patient who consents to admission. Discussed the patient with Dr. Kenyon , who will admit the patient to [facility] for further monitoring, evaluation, and treatment.    Impression & Plan      Medical Decision Making:  Daya Ignacio is a 86 year old female who presents today, after being discharged from the hospital yesterday for a hip fracture, for generalized weakness. She states she is unable to walk, even with assistance, to the bathroom at her facility today.  She has no focal weakness on neurologic exam. She has been on Cipro for the past few days for a UTI and on repeat urine today, it appears that this UTI is clearing. She has normal lactic acid here. With this generalized weakness I did perform abroad workup including cardiac workup which shows a normal EKG with no acute abnormalities and a negative troponin. She has been feeling weak all morning, greater than 6 hours, therefore I feel that this rules her out for ACS. Furthermore, she does not have any chest pain or shortness of breath. Patient does note that she has been having several episodes of diarrhea since last night, and her creatinine is bumped a little bit here which makes me believe that she is probably dehydrated, volume depleted which could be contributing to her weakness. White count is elevated as well and given that she is recently hospitalized and on antibiotics, she may have some C diff infection going on. We did send for a C. Diff test but it is not back yet. I gave the patient fluids here and on recheck she is still feeling extremely weak and does not feel that she could get up and ambulate even with assistance, therefore we will admit her to inpatient for fluids. She is admitted after I spoke with Dr. Kenyon of the hospital service.     Diagnosis:    ICD-10-CM   1. Weakness R53.1   2. Diarrhea, unspecified type R19.7       Disposition:  Patient is admitted to an inpatient bed under the care of Dr. Kenyon.     Syed Webb  6/22/2017   [facility] EMERGENCY DEPARTMENT    Syed LYNN, am serving as a scribe at 12:05 PM on 6/22/2017 to document services personally performed by Zeenat Arvizu based on my observations and the provider's statements to me.        Zeenat Arvizu PA-C  06/22/17 [id number]     no

## 2020-04-07 ENCOUNTER — RECORDS - HEALTHEAST (OUTPATIENT)
Dept: LAB | Facility: CLINIC | Age: 85
End: 2020-04-07

## 2020-04-07 LAB
ALBUMIN UR-MCNC: ABNORMAL MG/DL
APPEARANCE UR: ABNORMAL
BACTERIA #/AREA URNS HPF: ABNORMAL HPF
BILIRUB UR QL STRIP: NEGATIVE
CAOX CRY #/AREA URNS HPF: PRESENT /[HPF]
COLOR UR AUTO: YELLOW
GLUCOSE UR STRIP-MCNC: NEGATIVE MG/DL
HGB UR QL STRIP: ABNORMAL
KETONES UR STRIP-MCNC: NEGATIVE MG/DL
LEUKOCYTE ESTERASE UR QL STRIP: ABNORMAL
MUCOUS THREADS #/AREA URNS LPF: ABNORMAL LPF
NITRATE UR QL: NEGATIVE
PH UR STRIP: 7.5 [PH] (ref 4.5–8)
RBC #/AREA URNS AUTO: >100 HPF
SP GR UR STRIP: 1.01 (ref 1–1.03)
SQUAMOUS #/AREA URNS AUTO: ABNORMAL LPF
UROBILINOGEN UR STRIP-ACNC: ABNORMAL
WBC #/AREA URNS AUTO: >100 HPF
WBC CLUMPS #/AREA URNS HPF: PRESENT /[HPF]

## 2020-04-08 LAB — BACTERIA SPEC CULT: NO GROWTH

## 2020-04-30 ENCOUNTER — TRANSFERRED RECORDS (OUTPATIENT)
Dept: HEALTH INFORMATION MANAGEMENT | Facility: CLINIC | Age: 85
End: 2020-04-30

## 2020-05-06 ENCOUNTER — APPOINTMENT (OUTPATIENT)
Dept: CT IMAGING | Facility: CLINIC | Age: 85
DRG: 854 | End: 2020-05-06
Attending: EMERGENCY MEDICINE
Payer: COMMERCIAL

## 2020-05-06 ENCOUNTER — ANESTHESIA EVENT (OUTPATIENT)
Dept: SURGERY | Facility: CLINIC | Age: 85
DRG: 854 | End: 2020-05-06
Payer: COMMERCIAL

## 2020-05-06 ENCOUNTER — APPOINTMENT (OUTPATIENT)
Dept: GENERAL RADIOLOGY | Facility: CLINIC | Age: 85
DRG: 854 | End: 2020-05-06
Attending: INTERNAL MEDICINE
Payer: COMMERCIAL

## 2020-05-06 ENCOUNTER — HOSPITAL ENCOUNTER (INPATIENT)
Facility: CLINIC | Age: 85
LOS: 6 days | Discharge: HOME HEALTH | DRG: 854 | End: 2020-05-12
Attending: EMERGENCY MEDICINE | Admitting: INTERNAL MEDICINE
Payer: COMMERCIAL

## 2020-05-06 ENCOUNTER — ANESTHESIA (OUTPATIENT)
Dept: SURGERY | Facility: CLINIC | Age: 85
DRG: 854 | End: 2020-05-06
Payer: COMMERCIAL

## 2020-05-06 ENCOUNTER — APPOINTMENT (OUTPATIENT)
Dept: GENERAL RADIOLOGY | Facility: CLINIC | Age: 85
DRG: 854 | End: 2020-05-06
Attending: EMERGENCY MEDICINE
Payer: COMMERCIAL

## 2020-05-06 DIAGNOSIS — N20.1 URETERAL STONE: ICD-10-CM

## 2020-05-06 DIAGNOSIS — N10 ACUTE PYELONEPHRITIS: Primary | ICD-10-CM

## 2020-05-06 DIAGNOSIS — R19.7 DIARRHEA, UNSPECIFIED TYPE: ICD-10-CM

## 2020-05-06 DIAGNOSIS — N20.1 URETERAL STONE: Primary | ICD-10-CM

## 2020-05-06 DIAGNOSIS — Z86.19 HISTORY OF ESBL E. COLI INFECTION: ICD-10-CM

## 2020-05-06 DIAGNOSIS — N30.01 ACUTE CYSTITIS WITH HEMATURIA: ICD-10-CM

## 2020-05-06 DIAGNOSIS — Z20.822 SUSPECTED COVID-19 VIRUS INFECTION: ICD-10-CM

## 2020-05-06 DIAGNOSIS — R53.1 GENERALIZED WEAKNESS: ICD-10-CM

## 2020-05-06 DIAGNOSIS — R11.0 NAUSEA: ICD-10-CM

## 2020-05-06 PROBLEM — N39.0 URINARY TRACT INFECTION WITHOUT HEMATURIA, SITE UNSPECIFIED: Status: ACTIVE | Noted: 2019-07-04

## 2020-05-06 PROBLEM — N39.0 UTI (URINARY TRACT INFECTION): Status: ACTIVE | Noted: 2019-05-26

## 2020-05-06 LAB
ABO + RH BLD: NORMAL
ABO + RH BLD: NORMAL
ALBUMIN SERPL-MCNC: 3.7 G/DL (ref 3.4–5)
ALBUMIN UR-MCNC: 30 MG/DL
ALP SERPL-CCNC: 113 U/L (ref 40–150)
ALT SERPL W P-5'-P-CCNC: 12 U/L (ref 0–50)
AMORPH CRY #/AREA URNS HPF: ABNORMAL /HPF
ANION GAP SERPL CALCULATED.3IONS-SCNC: 9 MMOL/L (ref 3–14)
APPEARANCE UR: ABNORMAL
AST SERPL W P-5'-P-CCNC: 15 U/L (ref 0–45)
BACTERIA #/AREA URNS HPF: ABNORMAL /HPF
BASOPHILS # BLD AUTO: 0 10E9/L (ref 0–0.2)
BASOPHILS NFR BLD AUTO: 0 %
BILIRUB SERPL-MCNC: 1.3 MG/DL (ref 0.2–1.3)
BILIRUB UR QL STRIP: NEGATIVE
BLD GP AB SCN SERPL QL: NORMAL
BLOOD BANK CMNT PATIENT-IMP: NORMAL
BUN SERPL-MCNC: 24 MG/DL (ref 7–30)
CALCIUM SERPL-MCNC: 9.9 MG/DL (ref 8.5–10.1)
CHLORIDE SERPL-SCNC: 105 MMOL/L (ref 94–109)
CO2 SERPL-SCNC: 25 MMOL/L (ref 20–32)
COLOR UR AUTO: ABNORMAL
CREAT SERPL-MCNC: 0.94 MG/DL (ref 0.52–1.04)
DIFFERENTIAL METHOD BLD: ABNORMAL
EOSINOPHIL # BLD AUTO: 0 10E9/L (ref 0–0.7)
EOSINOPHIL NFR BLD AUTO: 0 %
ERYTHROCYTE [DISTWIDTH] IN BLOOD BY AUTOMATED COUNT: 13.9 % (ref 10–15)
GFR SERPL CREATININE-BSD FRML MDRD: 54 ML/MIN/{1.73_M2}
GLUCOSE SERPL-MCNC: 99 MG/DL (ref 70–99)
GLUCOSE UR STRIP-MCNC: NEGATIVE MG/DL
HCT VFR BLD AUTO: 42.3 % (ref 35–47)
HGB BLD-MCNC: 13.5 G/DL (ref 11.7–15.7)
HGB UR QL STRIP: ABNORMAL
INR PPP: 1.13 (ref 0.86–1.14)
INTERPRETATION ECG - MUSE: NORMAL
KETONES UR STRIP-MCNC: NEGATIVE MG/DL
LACTATE BLD-SCNC: 3.6 MMOL/L (ref 0.7–2)
LACTATE BLD-SCNC: 4.1 MMOL/L (ref 0.7–2)
LEUKOCYTE ESTERASE UR QL STRIP: POSITIVE
LIPASE SERPL-CCNC: 42 U/L (ref 73–393)
LYMPHOCYTES # BLD AUTO: 0.2 10E9/L (ref 0.8–5.3)
LYMPHOCYTES NFR BLD AUTO: 6 %
MCH RBC QN AUTO: 31.7 PG (ref 26.5–33)
MCHC RBC AUTO-ENTMCNC: 31.9 G/DL (ref 31.5–36.5)
MCV RBC AUTO: 99 FL (ref 78–100)
MONOCYTES # BLD AUTO: 0.1 10E9/L (ref 0–1.3)
MONOCYTES NFR BLD AUTO: 2 %
MUCOUS THREADS #/AREA URNS LPF: PRESENT /LPF
NEUTROPHILS # BLD AUTO: 3.2 10E9/L (ref 1.6–8.3)
NEUTROPHILS NFR BLD AUTO: 92 %
NITRATE UR QL: NEGATIVE
NRBC # BLD AUTO: 0 10*3/UL
NRBC BLD AUTO-RTO: 1 /100
NT-PROBNP SERPL-MCNC: 332 PG/ML (ref 0–1800)
PH UR STRIP: 7.5 PH (ref 5–7)
PLATELET # BLD AUTO: 168 10E9/L (ref 150–450)
PLATELET # BLD EST: ABNORMAL 10*3/UL
POTASSIUM SERPL-SCNC: 3.8 MMOL/L (ref 3.4–5.3)
PROT SERPL-MCNC: 7.3 G/DL (ref 6.8–8.8)
RBC # BLD AUTO: 4.26 10E12/L (ref 3.8–5.2)
RBC #/AREA URNS AUTO: >182 /HPF (ref 0–2)
RBC MORPH BLD: ABNORMAL
SARS-COV-2 PCR COMMENT: NORMAL
SARS-COV-2 RNA SPEC QL NAA+PROBE: NEGATIVE
SARS-COV-2 RNA SPEC QL NAA+PROBE: NORMAL
SODIUM SERPL-SCNC: 139 MMOL/L (ref 133–144)
SOURCE: ABNORMAL
SP GR UR STRIP: 1.01 (ref 1–1.03)
SPECIMEN EXP DATE BLD: NORMAL
SPECIMEN SOURCE: NORMAL
SPECIMEN SOURCE: NORMAL
SQUAMOUS #/AREA URNS AUTO: 1 /HPF (ref 0–1)
UROBILINOGEN UR STRIP-MCNC: NORMAL MG/DL (ref 0–2)
WBC # BLD AUTO: 3.5 10E9/L (ref 4–11)
WBC #/AREA URNS AUTO: >182 /HPF (ref 0–5)
WBC CLUMPS #/AREA URNS HPF: PRESENT /HPF

## 2020-05-06 PROCEDURE — 12000000 ZZH R&B MED SURG/OB

## 2020-05-06 PROCEDURE — 40000306 ZZH STATISTIC PRE PROC ASSESS II: Performed by: UROLOGY

## 2020-05-06 PROCEDURE — 96365 THER/PROPH/DIAG IV INF INIT: CPT | Mod: 59

## 2020-05-06 PROCEDURE — 74420 UROGRAPHY RTRGR +-KUB: CPT | Mod: 26 | Performed by: UROLOGY

## 2020-05-06 PROCEDURE — 25000125 ZZHC RX 250: Performed by: UROLOGY

## 2020-05-06 PROCEDURE — 96376 TX/PRO/DX INJ SAME DRUG ADON: CPT

## 2020-05-06 PROCEDURE — 87186 SC STD MICRODIL/AGAR DIL: CPT | Performed by: EMERGENCY MEDICINE

## 2020-05-06 PROCEDURE — 25000125 ZZHC RX 250: Performed by: NURSE ANESTHETIST, CERTIFIED REGISTERED

## 2020-05-06 PROCEDURE — 87800 DETECT AGNT MULT DNA DIREC: CPT | Performed by: EMERGENCY MEDICINE

## 2020-05-06 PROCEDURE — 25000128 H RX IP 250 OP 636: Performed by: UROLOGY

## 2020-05-06 PROCEDURE — C1769 GUIDE WIRE: HCPCS | Performed by: UROLOGY

## 2020-05-06 PROCEDURE — 71000015 ZZH RECOVERY PHASE 1 LEVEL 2 EA ADDTL HR: Performed by: UROLOGY

## 2020-05-06 PROCEDURE — 87635 SARS-COV-2 COVID-19 AMP PRB: CPT | Performed by: EMERGENCY MEDICINE

## 2020-05-06 PROCEDURE — 86901 BLOOD TYPING SEROLOGIC RH(D): CPT | Performed by: EMERGENCY MEDICINE

## 2020-05-06 PROCEDURE — 71000014 ZZH RECOVERY PHASE 1 LEVEL 2 FIRST HR: Performed by: UROLOGY

## 2020-05-06 PROCEDURE — 93005 ELECTROCARDIOGRAM TRACING: CPT

## 2020-05-06 PROCEDURE — 27210794 ZZH OR GENERAL SUPPLY STERILE: Performed by: UROLOGY

## 2020-05-06 PROCEDURE — 0T778DZ DILATION OF LEFT URETER WITH INTRALUMINAL DEVICE, VIA NATURAL OR ARTIFICIAL OPENING ENDOSCOPIC: ICD-10-PCS | Performed by: ANESTHESIOLOGY

## 2020-05-06 PROCEDURE — 25000132 ZZH RX MED GY IP 250 OP 250 PS 637: Performed by: EMERGENCY MEDICINE

## 2020-05-06 PROCEDURE — 81001 URINALYSIS AUTO W/SCOPE: CPT | Performed by: EMERGENCY MEDICINE

## 2020-05-06 PROCEDURE — 36000060 ZZH SURGERY LEVEL 3 W FLUORO 1ST 30 MIN: Performed by: UROLOGY

## 2020-05-06 PROCEDURE — 25800030 ZZH RX IP 258 OP 636: Performed by: PHYSICIAN ASSISTANT

## 2020-05-06 PROCEDURE — 37000009 ZZH ANESTHESIA TECHNICAL FEE, EACH ADDTL 15 MIN: Performed by: UROLOGY

## 2020-05-06 PROCEDURE — 83605 ASSAY OF LACTIC ACID: CPT | Performed by: INTERNAL MEDICINE

## 2020-05-06 PROCEDURE — 52332 CYSTOSCOPY AND TREATMENT: CPT | Mod: LT | Performed by: UROLOGY

## 2020-05-06 PROCEDURE — 87077 CULTURE AEROBIC IDENTIFY: CPT | Performed by: EMERGENCY MEDICINE

## 2020-05-06 PROCEDURE — 25000128 H RX IP 250 OP 636: Performed by: NURSE ANESTHETIST, CERTIFIED REGISTERED

## 2020-05-06 PROCEDURE — 96361 HYDRATE IV INFUSION ADD-ON: CPT

## 2020-05-06 PROCEDURE — 25500064 ZZH RX 255 OP 636: Performed by: UROLOGY

## 2020-05-06 PROCEDURE — 86850 RBC ANTIBODY SCREEN: CPT | Performed by: EMERGENCY MEDICINE

## 2020-05-06 PROCEDURE — 25000128 H RX IP 250 OP 636: Performed by: EMERGENCY MEDICINE

## 2020-05-06 PROCEDURE — 80053 COMPREHEN METABOLIC PANEL: CPT | Performed by: EMERGENCY MEDICINE

## 2020-05-06 PROCEDURE — 25000125 ZZHC RX 250: Performed by: EMERGENCY MEDICINE

## 2020-05-06 PROCEDURE — 40000277 XR SURGERY CARM FLUORO LESS THAN 5 MIN W STILLS: Mod: TC

## 2020-05-06 PROCEDURE — 99223 1ST HOSP IP/OBS HIGH 75: CPT | Mod: AI | Performed by: INTERNAL MEDICINE

## 2020-05-06 PROCEDURE — 83690 ASSAY OF LIPASE: CPT | Performed by: EMERGENCY MEDICINE

## 2020-05-06 PROCEDURE — 25800025 ZZH RX 258: Performed by: UROLOGY

## 2020-05-06 PROCEDURE — 71045 X-RAY EXAM CHEST 1 VIEW: CPT

## 2020-05-06 PROCEDURE — 87040 BLOOD CULTURE FOR BACTERIA: CPT | Performed by: EMERGENCY MEDICINE

## 2020-05-06 PROCEDURE — 96375 TX/PRO/DX INJ NEW DRUG ADDON: CPT

## 2020-05-06 PROCEDURE — 71275 CT ANGIOGRAPHY CHEST: CPT

## 2020-05-06 PROCEDURE — 85025 COMPLETE CBC W/AUTO DIFF WBC: CPT | Performed by: EMERGENCY MEDICINE

## 2020-05-06 PROCEDURE — 25800030 ZZH RX IP 258 OP 636: Performed by: UROLOGY

## 2020-05-06 PROCEDURE — 36415 COLL VENOUS BLD VENIPUNCTURE: CPT | Performed by: INTERNAL MEDICINE

## 2020-05-06 PROCEDURE — 83880 ASSAY OF NATRIURETIC PEPTIDE: CPT | Performed by: EMERGENCY MEDICINE

## 2020-05-06 PROCEDURE — 25800030 ZZH RX IP 258 OP 636: Performed by: ANESTHESIOLOGY

## 2020-05-06 PROCEDURE — 87086 URINE CULTURE/COLONY COUNT: CPT | Performed by: EMERGENCY MEDICINE

## 2020-05-06 PROCEDURE — 86900 BLOOD TYPING SEROLOGIC ABO: CPT | Performed by: EMERGENCY MEDICINE

## 2020-05-06 PROCEDURE — 87088 URINE BACTERIA CULTURE: CPT | Performed by: EMERGENCY MEDICINE

## 2020-05-06 PROCEDURE — 25800030 ZZH RX IP 258 OP 636: Performed by: EMERGENCY MEDICINE

## 2020-05-06 PROCEDURE — 85610 PROTHROMBIN TIME: CPT | Performed by: EMERGENCY MEDICINE

## 2020-05-06 PROCEDURE — 37000008 ZZH ANESTHESIA TECHNICAL FEE, 1ST 30 MIN: Performed by: UROLOGY

## 2020-05-06 PROCEDURE — 99285 EMERGENCY DEPT VISIT HI MDM: CPT | Mod: 25

## 2020-05-06 PROCEDURE — 25000128 H RX IP 250 OP 636

## 2020-05-06 PROCEDURE — C2617 STENT, NON-COR, TEM W/O DEL: HCPCS | Performed by: UROLOGY

## 2020-05-06 PROCEDURE — 25800030 ZZH RX IP 258 OP 636: Performed by: INTERNAL MEDICINE

## 2020-05-06 PROCEDURE — 99221 1ST HOSP IP/OBS SF/LOW 40: CPT | Mod: 25 | Performed by: UROLOGY

## 2020-05-06 PROCEDURE — 83605 ASSAY OF LACTIC ACID: CPT | Performed by: PHYSICIAN ASSISTANT

## 2020-05-06 DEVICE — STENT URETERAL POLARIS ULTRA 6FRX26CM M0061921330
Type: IMPLANTABLE DEVICE | Site: URETHRA | Status: NON-FUNCTIONAL
Removed: 2020-10-30

## 2020-05-06 RX ORDER — HYDROMORPHONE HYDROCHLORIDE 1 MG/ML
.3-.5 INJECTION, SOLUTION INTRAMUSCULAR; INTRAVENOUS; SUBCUTANEOUS EVERY 5 MIN PRN
Status: DISCONTINUED | OUTPATIENT
Start: 2020-05-06 | End: 2020-05-06 | Stop reason: HOSPADM

## 2020-05-06 RX ORDER — PROPOFOL 10 MG/ML
INJECTION, EMULSION INTRAVENOUS CONTINUOUS PRN
Status: DISCONTINUED | OUTPATIENT
Start: 2020-05-06 | End: 2020-05-06

## 2020-05-06 RX ORDER — ONDANSETRON 2 MG/ML
4 INJECTION INTRAMUSCULAR; INTRAVENOUS EVERY 30 MIN PRN
Status: DISCONTINUED | OUTPATIENT
Start: 2020-05-06 | End: 2020-05-06 | Stop reason: HOSPADM

## 2020-05-06 RX ORDER — MEROPENEM 1 G/1
1 INJECTION, POWDER, FOR SOLUTION INTRAVENOUS EVERY 8 HOURS
Status: DISCONTINUED | OUTPATIENT
Start: 2020-05-06 | End: 2020-05-12

## 2020-05-06 RX ORDER — ACETAMINOPHEN 325 MG/1
650 TABLET ORAL 3 TIMES DAILY
Status: ON HOLD | COMMUNITY
End: 2024-05-05

## 2020-05-06 RX ORDER — ONDANSETRON 2 MG/ML
4 INJECTION INTRAMUSCULAR; INTRAVENOUS EVERY 6 HOURS PRN
Status: DISCONTINUED | OUTPATIENT
Start: 2020-05-06 | End: 2020-05-12 | Stop reason: HOSPADM

## 2020-05-06 RX ORDER — SERTRALINE HYDROCHLORIDE 100 MG/1
100 TABLET, FILM COATED ORAL DAILY
Status: DISCONTINUED | OUTPATIENT
Start: 2020-05-06 | End: 2020-05-12 | Stop reason: HOSPADM

## 2020-05-06 RX ORDER — OXYCODONE HYDROCHLORIDE 5 MG/1
2.5 TABLET ORAL 2 TIMES DAILY PRN
Status: ON HOLD | COMMUNITY
End: 2020-11-01

## 2020-05-06 RX ORDER — FENTANYL CITRATE 50 UG/ML
25-50 INJECTION, SOLUTION INTRAMUSCULAR; INTRAVENOUS
Status: DISCONTINUED | OUTPATIENT
Start: 2020-05-06 | End: 2020-05-06 | Stop reason: HOSPADM

## 2020-05-06 RX ORDER — AMLODIPINE BESYLATE 10 MG/1
10 TABLET ORAL DAILY
Status: DISCONTINUED | OUTPATIENT
Start: 2020-05-06 | End: 2020-05-07

## 2020-05-06 RX ORDER — CARVEDILOL 25 MG/1
25 TABLET ORAL AT BEDTIME
COMMUNITY
End: 2024-05-02

## 2020-05-06 RX ORDER — CEFAZOLIN SODIUM 1 G/3ML
1 INJECTION, POWDER, FOR SOLUTION INTRAMUSCULAR; INTRAVENOUS SEE ADMIN INSTRUCTIONS
Status: DISCONTINUED | OUTPATIENT
Start: 2020-05-06 | End: 2020-05-06

## 2020-05-06 RX ORDER — SODIUM CHLORIDE 9 MG/ML
INJECTION, SOLUTION INTRAVENOUS CONTINUOUS
Status: ACTIVE | OUTPATIENT
Start: 2020-05-06 | End: 2020-05-06

## 2020-05-06 RX ORDER — LIDOCAINE 40 MG/G
CREAM TOPICAL
Status: DISCONTINUED | OUTPATIENT
Start: 2020-05-06 | End: 2020-05-12 | Stop reason: HOSPADM

## 2020-05-06 RX ORDER — PROPOFOL 10 MG/ML
INJECTION, EMULSION INTRAVENOUS PRN
Status: DISCONTINUED | OUTPATIENT
Start: 2020-05-06 | End: 2020-05-06

## 2020-05-06 RX ORDER — AMOXICILLIN 250 MG
2 CAPSULE ORAL 2 TIMES DAILY PRN
Status: DISCONTINUED | OUTPATIENT
Start: 2020-05-06 | End: 2020-05-12 | Stop reason: HOSPADM

## 2020-05-06 RX ORDER — IOPAMIDOL 755 MG/ML
500 INJECTION, SOLUTION INTRAVASCULAR ONCE
Status: COMPLETED | OUTPATIENT
Start: 2020-05-06 | End: 2020-05-06

## 2020-05-06 RX ORDER — ONDANSETRON 2 MG/ML
4 INJECTION INTRAMUSCULAR; INTRAVENOUS ONCE
Status: COMPLETED | OUTPATIENT
Start: 2020-05-06 | End: 2020-05-06

## 2020-05-06 RX ORDER — AMOXICILLIN 250 MG
1 CAPSULE ORAL 2 TIMES DAILY PRN
Status: DISCONTINUED | OUTPATIENT
Start: 2020-05-06 | End: 2020-05-12 | Stop reason: HOSPADM

## 2020-05-06 RX ORDER — AMLODIPINE BESYLATE 10 MG/1
10 TABLET ORAL DAILY
COMMUNITY
End: 2024-06-19

## 2020-05-06 RX ORDER — EPHEDRINE SULFATE 50 MG/ML
INJECTION, SOLUTION INTRAVENOUS PRN
Status: DISCONTINUED | OUTPATIENT
Start: 2020-05-06 | End: 2020-05-06

## 2020-05-06 RX ORDER — GLYCOPYRROLATE 0.2 MG/ML
INJECTION, SOLUTION INTRAMUSCULAR; INTRAVENOUS PRN
Status: DISCONTINUED | OUTPATIENT
Start: 2020-05-06 | End: 2020-05-06

## 2020-05-06 RX ORDER — POLYETHYLENE GLYCOL 3350 17 G/17G
17 POWDER, FOR SOLUTION ORAL DAILY PRN
Status: DISCONTINUED | OUTPATIENT
Start: 2020-05-06 | End: 2020-05-12 | Stop reason: HOSPADM

## 2020-05-06 RX ORDER — NALOXONE HYDROCHLORIDE 0.4 MG/ML
.1-.4 INJECTION, SOLUTION INTRAMUSCULAR; INTRAVENOUS; SUBCUTANEOUS
Status: DISCONTINUED | OUTPATIENT
Start: 2020-05-06 | End: 2020-05-06

## 2020-05-06 RX ORDER — CLONIDINE HYDROCHLORIDE 0.1 MG/1
0.1 TABLET ORAL 2 TIMES DAILY
Status: DISCONTINUED | OUTPATIENT
Start: 2020-05-06 | End: 2020-05-07

## 2020-05-06 RX ORDER — LORAZEPAM 2 MG/ML
0.3 INJECTION INTRAMUSCULAR ONCE
Status: DISCONTINUED | OUTPATIENT
Start: 2020-05-06 | End: 2020-05-06

## 2020-05-06 RX ORDER — PHENYLEPHRINE HYDROCHLORIDE 10 MG/ML
INJECTION INTRAVENOUS PRN
Status: DISCONTINUED | OUTPATIENT
Start: 2020-05-06 | End: 2020-05-06

## 2020-05-06 RX ORDER — CARVEDILOL 25 MG/1
25 TABLET ORAL AT BEDTIME
Status: DISCONTINUED | OUTPATIENT
Start: 2020-05-06 | End: 2020-05-07

## 2020-05-06 RX ORDER — MEROPENEM 1 G/1
1 INJECTION, POWDER, FOR SOLUTION INTRAVENOUS ONCE
Status: COMPLETED | OUTPATIENT
Start: 2020-05-06 | End: 2020-05-06

## 2020-05-06 RX ORDER — ACETAMINOPHEN 325 MG/1
975 TABLET ORAL ONCE
Status: DISCONTINUED | OUTPATIENT
Start: 2020-05-06 | End: 2020-05-06 | Stop reason: HOSPADM

## 2020-05-06 RX ORDER — ACETAMINOPHEN 325 MG/1
650 TABLET ORAL ONCE
Status: COMPLETED | OUTPATIENT
Start: 2020-05-06 | End: 2020-05-06

## 2020-05-06 RX ORDER — GABAPENTIN 100 MG/1
200 CAPSULE ORAL 2 TIMES DAILY
Status: DISCONTINUED | OUTPATIENT
Start: 2020-05-06 | End: 2020-05-07

## 2020-05-06 RX ORDER — ACETAMINOPHEN 500 MG
500 TABLET ORAL 3 TIMES DAILY
Status: ON HOLD | COMMUNITY
End: 2020-11-01

## 2020-05-06 RX ORDER — SODIUM CHLORIDE, SODIUM LACTATE, POTASSIUM CHLORIDE, CALCIUM CHLORIDE 600; 310; 30; 20 MG/100ML; MG/100ML; MG/100ML; MG/100ML
INJECTION, SOLUTION INTRAVENOUS CONTINUOUS
Status: DISCONTINUED | OUTPATIENT
Start: 2020-05-06 | End: 2020-05-06

## 2020-05-06 RX ORDER — CEFAZOLIN SODIUM 1 G
1 VIAL (EA) INJECTION SEE ADMIN INSTRUCTIONS
Status: CANCELLED | OUTPATIENT
Start: 2020-05-06

## 2020-05-06 RX ORDER — FENTANYL CITRATE 50 UG/ML
INJECTION, SOLUTION INTRAMUSCULAR; INTRAVENOUS PRN
Status: DISCONTINUED | OUTPATIENT
Start: 2020-05-06 | End: 2020-05-06

## 2020-05-06 RX ORDER — CEFAZOLIN SODIUM 2 G/100ML
2 INJECTION, SOLUTION INTRAVENOUS
Status: DISCONTINUED | OUTPATIENT
Start: 2020-05-06 | End: 2020-05-06

## 2020-05-06 RX ORDER — ONDANSETRON 4 MG/1
4 TABLET, ORALLY DISINTEGRATING ORAL EVERY 6 HOURS PRN
Status: DISCONTINUED | OUTPATIENT
Start: 2020-05-06 | End: 2020-05-12 | Stop reason: HOSPADM

## 2020-05-06 RX ORDER — ONDANSETRON 4 MG/1
4 TABLET, ORALLY DISINTEGRATING ORAL EVERY 30 MIN PRN
Status: DISCONTINUED | OUTPATIENT
Start: 2020-05-06 | End: 2020-05-06 | Stop reason: HOSPADM

## 2020-05-06 RX ORDER — ONDANSETRON 2 MG/ML
INJECTION INTRAMUSCULAR; INTRAVENOUS
Status: COMPLETED
Start: 2020-05-06 | End: 2020-05-06

## 2020-05-06 RX ORDER — NALOXONE HYDROCHLORIDE 0.4 MG/ML
.1-.4 INJECTION, SOLUTION INTRAMUSCULAR; INTRAVENOUS; SUBCUTANEOUS
Status: DISCONTINUED | OUTPATIENT
Start: 2020-05-06 | End: 2020-05-12 | Stop reason: HOSPADM

## 2020-05-06 RX ORDER — ACETAMINOPHEN 325 MG/1
650 TABLET ORAL EVERY 4 HOURS PRN
Status: DISCONTINUED | OUTPATIENT
Start: 2020-05-06 | End: 2020-05-07

## 2020-05-06 RX ORDER — CARVEDILOL 12.5 MG/1
12.5 TABLET ORAL EVERY MORNING
Status: DISCONTINUED | OUTPATIENT
Start: 2020-05-07 | End: 2020-05-07

## 2020-05-06 RX ORDER — ONDANSETRON 2 MG/ML
INJECTION INTRAMUSCULAR; INTRAVENOUS PRN
Status: DISCONTINUED | OUTPATIENT
Start: 2020-05-06 | End: 2020-05-06

## 2020-05-06 RX ORDER — OXYCODONE HYDROCHLORIDE 5 MG/1
5 TABLET ORAL ONCE
Status: COMPLETED | OUTPATIENT
Start: 2020-05-06 | End: 2020-05-06

## 2020-05-06 RX ORDER — CLONIDINE HYDROCHLORIDE 0.1 MG/1
0.1 TABLET ORAL AT BEDTIME
Status: ON HOLD | COMMUNITY
End: 2023-02-02

## 2020-05-06 RX ORDER — DEXAMETHASONE SODIUM PHOSPHATE 4 MG/ML
INJECTION, SOLUTION INTRA-ARTICULAR; INTRALESIONAL; INTRAMUSCULAR; INTRAVENOUS; SOFT TISSUE PRN
Status: DISCONTINUED | OUTPATIENT
Start: 2020-05-06 | End: 2020-05-06

## 2020-05-06 RX ORDER — ISOSORBIDE DINITRATE 10 MG/1
10 TABLET ORAL 3 TIMES DAILY
Status: DISCONTINUED | OUTPATIENT
Start: 2020-05-06 | End: 2020-05-06

## 2020-05-06 RX ORDER — LISINOPRIL 30 MG/1
30 TABLET ORAL DAILY
Status: ON HOLD | COMMUNITY
End: 2024-06-24

## 2020-05-06 RX ORDER — SODIUM CHLORIDE, SODIUM LACTATE, POTASSIUM CHLORIDE, CALCIUM CHLORIDE 600; 310; 30; 20 MG/100ML; MG/100ML; MG/100ML; MG/100ML
INJECTION, SOLUTION INTRAVENOUS CONTINUOUS
Status: DISCONTINUED | OUTPATIENT
Start: 2020-05-06 | End: 2020-05-06 | Stop reason: HOSPADM

## 2020-05-06 RX ADMIN — EPHEDRINE SULFATE 5 MG: 50 INJECTION, SOLUTION INTRAVENOUS at 16:43

## 2020-05-06 RX ADMIN — MEROPENEM 1 G: 1 INJECTION, POWDER, FOR SOLUTION INTRAVENOUS at 20:51

## 2020-05-06 RX ADMIN — ONDANSETRON 4 MG: 2 INJECTION INTRAMUSCULAR; INTRAVENOUS at 10:45

## 2020-05-06 RX ADMIN — PHENYLEPHRINE HYDROCHLORIDE 100 MCG: 10 INJECTION INTRAVENOUS at 16:54

## 2020-05-06 RX ADMIN — MEROPENEM 1 G: 1 INJECTION, POWDER, FOR SOLUTION INTRAVENOUS at 12:46

## 2020-05-06 RX ADMIN — IOPAMIDOL 60 ML: 755 INJECTION, SOLUTION INTRAVENOUS at 11:58

## 2020-05-06 RX ADMIN — PHENYLEPHRINE HYDROCHLORIDE 200 MCG: 10 INJECTION INTRAVENOUS at 16:48

## 2020-05-06 RX ADMIN — FAMOTIDINE 20 MG: 10 INJECTION INTRAVENOUS at 11:01

## 2020-05-06 RX ADMIN — FENTANYL CITRATE 50 MCG: 50 INJECTION, SOLUTION INTRAMUSCULAR; INTRAVENOUS at 16:39

## 2020-05-06 RX ADMIN — PROPOFOL 75 MCG/KG/MIN: 10 INJECTION, EMULSION INTRAVENOUS at 16:41

## 2020-05-06 RX ADMIN — PHENYLEPHRINE HYDROCHLORIDE 100 MCG: 10 INJECTION INTRAVENOUS at 16:57

## 2020-05-06 RX ADMIN — PHENYLEPHRINE HYDROCHLORIDE 200 MCG: 10 INJECTION INTRAVENOUS at 16:43

## 2020-05-06 RX ADMIN — PHENYLEPHRINE HYDROCHLORIDE 200 MCG: 10 INJECTION INTRAVENOUS at 16:45

## 2020-05-06 RX ADMIN — PHENYLEPHRINE HYDROCHLORIDE 100 MCG: 10 INJECTION INTRAVENOUS at 16:47

## 2020-05-06 RX ADMIN — SODIUM CHLORIDE 500 ML: 9 INJECTION, SOLUTION INTRAVENOUS at 09:46

## 2020-05-06 RX ADMIN — SODIUM CHLORIDE, POTASSIUM CHLORIDE, SODIUM LACTATE AND CALCIUM CHLORIDE: 600; 310; 30; 20 INJECTION, SOLUTION INTRAVENOUS at 16:48

## 2020-05-06 RX ADMIN — ONDANSETRON HYDROCHLORIDE 4 MG: 2 INJECTION, SOLUTION INTRAVENOUS at 16:53

## 2020-05-06 RX ADMIN — ONDANSETRON 4 MG: 2 INJECTION INTRAMUSCULAR; INTRAVENOUS at 09:46

## 2020-05-06 RX ADMIN — DEXAMETHASONE SODIUM PHOSPHATE 4 MG: 4 INJECTION, SOLUTION INTRA-ARTICULAR; INTRALESIONAL; INTRAMUSCULAR; INTRAVENOUS; SOFT TISSUE at 16:40

## 2020-05-06 RX ADMIN — SODIUM CHLORIDE, POTASSIUM CHLORIDE, SODIUM LACTATE AND CALCIUM CHLORIDE: 600; 310; 30; 20 INJECTION, SOLUTION INTRAVENOUS at 16:34

## 2020-05-06 RX ADMIN — SODIUM CHLORIDE 57 ML: 9 INJECTION, SOLUTION INTRAVENOUS at 11:58

## 2020-05-06 RX ADMIN — SODIUM CHLORIDE: 9 INJECTION, SOLUTION INTRAVENOUS at 21:07

## 2020-05-06 RX ADMIN — PHENYLEPHRINE HYDROCHLORIDE 200 MCG: 10 INJECTION INTRAVENOUS at 16:50

## 2020-05-06 RX ADMIN — OXYCODONE HYDROCHLORIDE 5 MG: 5 TABLET ORAL at 12:54

## 2020-05-06 RX ADMIN — SODIUM CHLORIDE 500 ML: 9 INJECTION, SOLUTION INTRAVENOUS at 20:38

## 2020-05-06 RX ADMIN — GLYCOPYRROLATE 0.1 MG: 0.2 INJECTION, SOLUTION INTRAMUSCULAR; INTRAVENOUS at 16:40

## 2020-05-06 RX ADMIN — PROPOFOL 90 MG: 10 INJECTION, EMULSION INTRAVENOUS at 16:40

## 2020-05-06 RX ADMIN — ACETAMINOPHEN 650 MG: 325 TABLET, FILM COATED ORAL at 10:34

## 2020-05-06 RX ADMIN — PHENYLEPHRINE HYDROCHLORIDE 100 MCG: 10 INJECTION INTRAVENOUS at 16:46

## 2020-05-06 RX ADMIN — EPHEDRINE SULFATE 5 MG: 50 INJECTION, SOLUTION INTRAVENOUS at 16:45

## 2020-05-06 RX ADMIN — SODIUM CHLORIDE 1000 ML: 9 INJECTION, SOLUTION INTRAVENOUS at 15:02

## 2020-05-06 SDOH — HEALTH STABILITY: MENTAL HEALTH: CURRENT SMOKER: 0

## 2020-05-06 ASSESSMENT — ENCOUNTER SYMPTOMS
DYSURIA: 0
BLOOD IN STOOL: 0
NAUSEA: 1
ABDOMINAL PAIN: 0
CHILLS: 1
FEVER: 0
VOMITING: 1
FREQUENCY: 0
WEAKNESS: 1
CHEST TIGHTNESS: 0
DIZZINESS: 1

## 2020-05-06 ASSESSMENT — ACTIVITIES OF DAILY LIVING (ADL): ADLS_ACUITY_SCORE: 19

## 2020-05-06 NOTE — ED NOTES
RN was notified by tech that pt was actively vomiting. RN checked on pt who's O2 sat was low 80's on 3L. O2 was increased to 5L NC, then switched to Oxymask at 10L. Pt given 4mg Zofran as well.

## 2020-05-06 NOTE — ED PROVIDER NOTES
History   Chief Complaint:  Nausea & Vomiting     HPI   Daya Ignacio is a 89 year old female with history of clostridium difficile colitis, hypertension, hyperlipidemia, UTI, among others who presents from assisted living facility via EMS for evaluation of nausea, associated with intermittent episodes of emesis, chills, dizziness, generalized weakness, and bowel movement urgency, that worsened this morning. The patient states for the past few days she has felt below baseline with mild nausea and a decreased appetite. This morning she began having bowel movement urgency and increased nausea. She is unsure if her bowel movements are loose or not, but she denies hematochezia. She also endorses intermittent episodes of emesis. Due to her symptoms, her facility staff called EMS for evaluation.    En route, EMS placed her on 3 L oxygen via nasal cannula due to low oxygen saturations in the 90s.     Here, the patient's oxygen saturation was 95 on room air. She endorses low back pain, but she states this is a chronic issue. She denies any abdominal pain, dysuria, urinary urgency or frequency, fever, chest pain, chest pressure/tightness, black or tarry stools, or hematemesis. She also denies any known ill contacts with similar symptoms or known exposure to COVID-19. She denies any recent antibiotic use.     Allergies:  Atorvastatin  Macrobid [Nitrofurantoin Anhydrous]  Sulfa Drugs  Penicillins     Medications:   Norvasc  Coreg   Catapres  Colace  Estrace  Neurontin  Isordil  Prinivil  Prilosec  Zofran  Roxicodone  Miralax  Senokot   Senna  Zoloft     Past Medical History:    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  History of clostridium difficile colitis   Hypertension  Lumbar compression fracture  Macular degeneration  Upper GI bleed  UTI  Osteoporosis   Hyperlipidemia  Chronic pain  NSTEMI  Sepsis    Sciatica  Anxiety     Past Surgical History:    Cataract repair, bilateral  Cystoscopy   EGD  Hip pinning procedure,  left  Hip arthroplasty, right  ORIF tibial plateau, right  Tonsillectomy   Adenoidectomy      Family History:    Mother - Alzheimer's disease  Father - MI  Brother - Parkinson's     Social History:  The patient was accompanied to the ED by EMS.  Smoking Status: Former, 0.25 packs/day  Smokeless Tobacco: Never Used  Alcohol Use: Yes  Drug Use: No  PCP: Johann Hall     Review of Systems   Constitutional: Positive for chills. Negative for fever.   Respiratory: Negative for chest tightness.    Cardiovascular: Negative for chest pain.   Gastrointestinal: Positive for nausea and vomiting. Negative for abdominal pain and blood in stool.        + Bowel Movement Urgency  - Hematemesis   Genitourinary: Negative for dysuria, frequency and urgency.   Neurological: Positive for dizziness and weakness.   All other systems reviewed and are negative.    Physical Exam     Patient Vitals for the past 24 hrs:   BP Temp Temp src Pulse Heart Rate Resp SpO2   05/06/20 1055 -- -- -- -- 96 -- 90 %   05/06/20 1015 134/57 -- -- 92 94 -- 98 %   05/06/20 1000 120/50 -- -- 92 -- -- 91 %   05/06/20 0915 (!) 120/102 -- -- 95 -- -- 95 %   05/06/20 0914 (!) 120/102 98.4  F (36.9  C) Oral 95 95 22 95 %     Physical Exam  Constitutional: Alert, attentive, GCS 15  HENT:    Nose: Nose normal.    Mouth/Throat: Oropharynx is clear, mucous membranes are moist  Eyes: EOM are normal, anicteric, conjugate gaze  CV: regular rate and rhythm; no murmurs  Chest: Effort normal and breath sounds clear without wheezing or rales, symmetric bilaterally   GI:  non tender. No distension. No guarding or rebound.    MSK: No LE edema, no tenderness to palpation of BLE.  Back: No CVA tenderness.  Neurological: Alert, attentive, moving all extremities equally.   Skin: Skin is warm and dry.   Emergency Department Course     ECG:  ECG taken at 0931, ECG read at 0935 by German Márquez MD  Sinus rhythm  Left axis deviation  Minimal voltage criteria for LVH, may be  normal variant  Nonspecific ST abnormality  Abnormal ECG  No significant changed compared to EKG dated 8/27/2019  Rate 88 bpm. UT interval 150. QRS duration 74. QT/QTc 364/440. P-R-T axes 87 -41 48.    Imaging:  Radiology findings were communicated with the patient and Admitting MD who voiced understanding of the findings.    XR Chest Port 1 View  IMPRESSION: No acute disease.  Reading per radiology.      CT Chest (PE) Abdomen Pelvis w Contrast  IMPRESSION:   1. 7 mm stone in the proximal aspect of the left ureter with  associated mild hydroureter/hydronephrosis. In addition, there is mild  mucosal enhancement of the left renal collecting system, nonspecific,  can be seen with underlying urinary tract infection.  2. Cholelithiasis without CT evidence of acute cholecystitis.  3. 2.9 cm left adnexal cyst, likely ovarian, recommend further  evaluation with pelvic ultrasound.  4. Enlarged heterogenous thyroid parenchyma with mild retrotracheal  extension of the right thyroid lobe.  Reading per radiology.     Laboratory:  Laboratory findings were communicated with the patient and Admitting MD who voiced understanding of the findings.    CBC: WBC 3.5 (L) o/w WNL (HGB 13.5, )   CMP: GFR 54 (L) o/w WNL (Creatinine 0.94)   Lipase: 42 (L)   INR: 1.13   Blood Culture x2: Pending     ABO/Rh type and screen: A Rh Negative. Antibodies Negative     UA with Microscopic: Urine blood large (A), pH urine 7.5 (H), Protein albumin urine 30 (A), Leukocyte esterase urine Positive (A), WBC/HPF >182 (H), RBC/HPF >182 (H), WBC Clumps Present (A), Bacteria many (A), Mucous urine Present (A), Amorphous Crystals Few (A) o/w WNL   Urine Culture: Pending.     COVID-19 Virus (Coronavirus) by PCR: Pending.      Interventions:  0946 0.9% NaCl bolus 1000 mL IV   0946 Zofran 4 mg IV  1034 Tylenol 650 mg PO  1045 Zofran 4 mg IV  1101 Pepcid 20 mg IV  1246 Merrem 1 g IV  1254 Roxicodone 5 mg PO    Emergency Department Course:  Past medical  records, nursing notes, and vitals reviewed.  EKG obtained in the ED, see results above.    The patient was sent for a XR Chest Port 1 View, CT Chest (PE) Abdomen Pelvis w Contrast while in the emergency department, results above.    IV was inserted and blood was drawn for laboratory testing, results above.   The patient provided a urine sample here in the emergency department. This was sent for laboratory testing, findings above.   A nasal swab was obtained for laboratory testing, findings above.      (0915)   I performed an exam of the patient as documented above. History obtained from patient.     (1150)   I rechecked the patient and discussed results and plan of care.     (1156)   Hospitalist paged.     (1215)   I spoke with MARGARITA Cota for Dr. Byrd of the Hospitalist service regarding patient's presentation, findings, and plan of care.     Findings and plan explained to the Patient who consents to admission. Discussed the patient with Dr. Byrd, who will admit the patient to a med/surg bed for further monitoring, evaluation, and treatment.  I personally reviewed the laboratory and imaging results with the Patient and answered all related questions prior to admission.     Impression & Plan     Covid-19  Daya Ignacio was evaluated during a global COVID-19 pandemic, which necessitated consideration that the patient might be at risk for infection with the SARS-CoV-2 virus that causes COVID-19.   Applicable protocols for evaluation were followed during the patient's care.   COVID-19 was considered as part of the patient's evaluation. The plan for testing is:  a test was obtained during this visit.     Medical Decision Makin-year-old woman with past medical history significant for C. difficile, hypertension, hyperlipidemia ESBL UTI presenting from her assisted living facility for nausea, vomiting chills and weakness as well as some mild diarrhea that began the day prior.  She denies any chest  symptoms including chest pain, chest pressure or shortness of breath, EKG without evidence of ischemia, however she did require up to 10 L nasal cannula oxygen for some mild hypoxia with a clear chest x-ray prompting CT pulmonary angiogram which I included abdomen pelvis to assess for possible bowel obstruction which was negative for evidence of PE but did show 7 mm right obstructing ureteral stone.  Her UA does show consistency with UTI she was started on meropenem given her history of ESBL.  Unfortunately due to other critically ill patients, I was unable to consult urology directly though this was done by the admitting team.  Symptomatically her vomiting was somewhat difficult control with antiemetics and she was given a dose of oral pain medications as she takes oxycodone PTA.  She was admitted to medicine for continued treatment for UTI, COVID r/u though less likely after CT.     Diagnosis:    ICD-10-CM    1. Acute cystitis with hematuria  N30.01    2. History of ESBL E. coli infection  Z86.19    3. Suspected Covid-19 Virus Infection  Z20.828      Disposition:  Admitted to a med/surg bed under the care of Dr. Byrd.     German Márquez MD  [employer]  10:22 AM 05/07/20     Scribe Disclosure:  Darvin LYNN, am serving as a scribe at 9:15 AM on 5/6/2020 to document services personally performed by German Márquez MD based on my observations and the provider's statements to me.  May 6, 2020   [facility] EMERGENCY DEPARTMENT        German Márquez MD  05/07/20 1022

## 2020-05-06 NOTE — ED NOTES
Bed: ED09  Expected date: 5/6/20  Expected time: 9:10 AM  Means of arrival: Ambulance  Comments:  88 yo F N/V

## 2020-05-06 NOTE — ED NOTES
Straight catheter placed to obtain UA. Urine sample collected and straight catheter was removed. Perineal care completed.

## 2020-05-06 NOTE — ED NOTES
RN spoke with ROBERTA Ortega at [place], where she lives, regarding the pt. RN informed that she had been on Keflex daily prophylactically due to these frequent UTIs. Carlee GRANADOS informed of plan for admission.

## 2020-05-06 NOTE — PROGRESS NOTES
Sepsis Evaluation Progress Note    I was called to see Daya Ignacio due to abnormal vital signs triggering the Sepsis SIRS screening alert. She is known to have an infection.     Physical Exam   Vital Signs:  Temp: 96.1  F (35.6  C) Temp src: Axillary BP: 95/42 Pulse: 80 Heart Rate: 73 Resp: 20 SpO2: 92 % O2 Device: Nasal cannula Oxygen Delivery: 3 LPM    Lab:  Lactic Acid   Date Value Ref Range Status   08/23/2019 0.7 0.7 - 2.0 mmol/L Final     Lactate for Sepsis Protocol   Date Value Ref Range Status   05/06/2020 4.1 (HH) 0.7 - 2.0 mmol/L Final     Comment:     Significant value called to and read back by  MARILEE LIEBERMAN ([email or web address]) ON 5.6.20 AT 1432 BY AEF         The patient is at baseline mental status.     The rest of their physical exam is significant for appears weak, bibasilar crackles on lung exam. Abdomen is non tender.     Assessment & Plan   Daya Ignacio meets SIRS criteria but does NOT have a lactate >2 or other evidence of acute organ damage.  These vital sign, lab and physical exam findings are consistent with SEPSIS.    Sepsis Time-Zero (time Sepsis diagnosis confirmed): 1446  05/06/20    Anti-infectives (From now, onward)    Start     Dose/Rate Route Frequency Ordered Stop    05/06/20 1930  meropenem (MERREM) 1 g vial to attach to  mL bag     Note to Pharmacy:  Pharmacy can adjust dose based on renal function.    1 g  over 30 Minutes Intravenous EVERY 8 HOURS 05/06/20 1350          Current antibiotic coverage is appropriate for source of infection.     Disposition: The patient will remain on the current unit. We will continue to monitor this patient closely..  Nicole Hernandez PA-C    Sepsis Criteria   Sepsis: 2+ SIRS criteria due to infection  Severe Sepsis: Sepsis AND 1+ new sign of acute organ dysfunction (Note: lactate >2 is organ dysfunction)  Septic Shock: Sepsis AND hypotension despite volume resuscitation with 30 ml/kg crystalloid

## 2020-05-06 NOTE — ANESTHESIA PREPROCEDURE EVALUATION
Anesthesia Pre-Procedure Evaluation    Patient: Daya Ignacio   MRN: [id number] : 10/11/1930          Preoperative Diagnosis: Ureteral stone [N20.1]    Procedure(s):  CYSTOSCOPY, WITH LEFT URETERAL STENT INSERTION    Past Medical History:   Diagnosis Date     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 2014     h/o Clostridium difficile colitis 2019     Hypertension      Lumbar compression fracture (H)      Macular degeneration (senile) of retina, unspecified      Upper GI bleed 2019     Urinary tract infection due to extended-spectrum beta lactamase (ESBL)-producing Klebsiella 2019    resistant to Bactrim     Past Surgical History:   Procedure Laterality Date     Cataract removal NOS Bilateral      CYSTOSCOPY N/A 2019    Procedure: Exam under anesthesia, video cystopanendoscopy;  Surgeon: Dennis Carlson MD;  Location:  OR     ESOPHAGOSCOPY, GASTROSCOPY, DUODENOSCOPY (EGD), COMBINED N/A 2019    Procedure: ESOPHAGOGASTRODUODENOSCOPY (EGD);  Surgeon: Gaurav Degroot MD;  Location:  GI     Hip Pinning procedure Left      Hip replacement NOS Right      OPEN REDUCTION INTERNAL FIXATION TIBIAL PLATEAU Right 2019    Procedure: Open reduction internal fixation right proximal tibia fracture;  Surgeon: Molina Pardo MD;  Location:  OR     TONSILLECTOMY, ADENOIDECTOMY, COMBINED       Anesthesia Evaluation     . Pt has had prior anesthetic. Type: General    No history of anesthetic complications          ROS/MED HX    ENT/Pulmonary:       Neurologic:       Cardiovascular:     (+) hypertension----. : . . . :. .       METS/Exercise Tolerance:     Hematologic:     (+) Anemia, -      Musculoskeletal:   (+) arthritis,  -       GI/Hepatic:     (+) Other GI/Hepatic       Renal/Genitourinary:     (+) chronic renal disease, type: ARF,       Endo:  - neg endo ROS       Psychiatric:         Infectious Disease:  - neg infectious disease ROS       Malignancy:         Other:    - neg  other ROS                      Physical Exam  Normal systems: cardiovascular, pulmonary and dental    Airway   Mallampati: II  TM distance: >3 FB  Neck ROM: full    Dental     Cardiovascular       Pulmonary             Lab Results   Component Value Date    WBC 3.5 (L) 05/06/2020    HGB 13.5 05/06/2020    HCT 42.3 05/06/2020     05/06/2020    CRP 1.1 07/09/2007    SED 10 07/09/2007     05/06/2020    POTASSIUM 3.8 05/06/2020    CHLORIDE 105 05/06/2020    CO2 25 05/06/2020    BUN 24 05/06/2020    CR 0.94 05/06/2020    GLC 99 05/06/2020    DAVE 9.9 05/06/2020    PHOS 5.5 (H) 07/15/2019    MAG 1.7 08/29/2019    ALBUMIN 3.7 05/06/2020    PROTTOTAL 7.3 05/06/2020    ALT 12 05/06/2020    AST 15 05/06/2020    ALKPHOS 113 05/06/2020    BILITOTAL 1.3 05/06/2020    LIPASE 42 (L) 05/06/2020    AMYLASE 361 (H) 06/25/2011    PTT 29 06/23/2011    INR 1.13 05/06/2020    FIBR 177 (L) 06/23/2011    TSH 0.91 08/23/2013       Preop Vitals  BP Readings from Last 3 Encounters:   05/06/20 91/42   11/20/19 132/71   11/14/19 106/54    Pulse Readings from Last 3 Encounters:   05/06/20 80   11/20/19 62   11/14/19 65      Resp Readings from Last 3 Encounters:   05/06/20 28   11/20/19 18   11/14/19 18    SpO2 Readings from Last 3 Encounters:   05/06/20 92%   11/20/19 94%   11/14/19 96%      Temp Readings from Last 1 Encounters:   05/06/20 98.4  F (36.9  C) (Oral)    Ht Readings from Last 1 Encounters:   11/20/19 1.524 m (5')      Wt Readings from Last 1 Encounters:   11/20/19 56.5 kg (124 lb 9.6 oz)    Estimated body mass index is 24.33 kg/m  as calculated from the following:    Height as of 11/20/19: 1.524 m (5').    Weight as of 11/20/19: 56.5 kg (124 lb 9.6 oz).       Anesthesia Plan      History & Physical Review  History and physical reviewed and following examination; no interval change.    ASA Status:  3 .    NPO Status:  > 8 hours    Plan for General with Intravenous induction. Maintenance will be Balanced.    PONV  prophylaxis:  Ondansetron (or other 5HT-3) and Dexamethasone or Solumedrol    The patient is not a current smoker      Postoperative Care  Postoperative pain management:  IV analgesics, Oral pain medications and Multi-modal analgesia.      Consents  Anesthetic plan, risks, benefits and alternatives discussed with:  Patient..                 Mervin Robertson MD                    .

## 2020-05-06 NOTE — ED NOTES
[facility]  ED Nurse Handoff Report    Chief Complaint:  Nausea & Vomiting      HPI   Daya Ignacio is a 89 year old female with history of clostridium difficile colitis, hypertension, hyperlipidemia, UTI, among others who presents from assisted living facility via EMS for evaluation of nausea, associated with intermittent episodes of emesis, chills, dizziness, generalized weakness, and bowel movement urgency, that worsened this morning. The patient states for the past few days she has felt below baseline with mild nausea and a decreased appetite. This morning she began having bowel movement urgency and increased nausea. She is unsure if her bowel movements are loose or not, but she denies hematochezia. She also endorses intermittent episodes of emesis. Due to her symptoms, her facility staff called EMS for evaluation.     En route, EMS placed her on 3 L oxygen via nasal cannula due to low oxygen saturations in the 90s.      Here, the patient's oxygen saturation was 95 on room air. She endorses low back pain, but she states this is a chronic issue. She denies any abdominal pain, dysuria, urinary urgency or frequency, fever, chest pain, chest pressure/tightness, black or tarry stools, or hematemesis. She also denies any known ill contacts with similar symptoms or known exposure to COVID-19. She denies any recent antibiotic use.      ED Chief complaint: Nausea & Vomiting  ED Diagnosis:   Final diagnoses:   Acute cystitis with hematuria   History of ESBL E. coli infection   Suspected Covid-19 Virus Infection     Allergies:   Allergies   Allergen Reactions     Atorvastatin Muscle Pain (Myalgia)     Macrobid [Nitrofurantoin Anhydrous] Other (See Comments)     Body aches     Nitrofurantoin Muscle Pain (Myalgia) and Unknown     Body aches       Seasonal Allergies Unknown     Sulfa Drugs Other (See Comments)     Tolerated Bactrim May 2019  Entire family has allergy to Sulfa     Sulfasalazine Unknown  "      Code Status: Not addressed in ED  Activity level - Baseline/Home:  Independent. Activity Level - Current:   Assist X 1. Lift room needed: No. Bariatric: No   Needed: No   Isolation: Yes. Infection: COVID R/O, ESBL.     Vital Signs:   Vitals:    05/06/20 1015 05/06/20 1055 05/06/20 1130 05/06/20 1145   BP: 134/57  135/57 104/56   Pulse: 92  90 85   Resp:       Temp:       TempSrc:       SpO2: 98% 90% 96% 96%       Cardiac Rhythm:  ,   Cardiac  Cardiac Rhythm: Normal sinus rhythm  Pain level: 0-10 Pain Scale: 8  Patient confused: No. Patient Falls Risk: Yes.   Elimination Status: Has voided   Patient Report - Initial Complaint: Nausea, Vomiting.   Focused Assessment:    09:24 Gastrointestinal Gastrointestinal - GI WDL: -WDL except; nausea and vomiting  Nausea/Vomiting Signs/Symptoms: nausea intermittent (Pt c/o nausea but has not had emesis here in the ED.) Last Bowel Movement: 05/06/20 (En route per EMS) Gastrointestinal Comment: Pt c/o intermittent NV since yesterday. Pt also reports difficulty with having a BM. Per EMS, pt had a BM en route and felt very lightheaded and like she was \"going to pass out.\" On arrival, pt had a BM again without difficulty.        Tests Performed: Blood Work, CT, UA  Abnormal Results:   Labs Ordered and Resulted from Time of ED Arrival Up to the Time of Departure from the ED   CBC WITH PLATELETS DIFFERENTIAL - Abnormal; Notable for the following components:       Result Value    WBC 3.5 (*)     Nucleated RBCs 1 (*)     Absolute Lymphocytes 0.2 (*)     All other components within normal limits   COMPREHENSIVE METABOLIC PANEL - Abnormal; Notable for the following components:    GFR Estimate 54 (*)     All other components within normal limits   ROUTINE UA WITH MICROSCOPIC - Abnormal; Notable for the following components:    Blood Urine Large (*)     pH Urine 7.5 (*)     Protein Albumin Urine 30 (*)     Leukocyte Esterase Urine Positive (*)     WBC Urine >182 (*)     RBC " Urine >182 (*)     WBC Clumps Present (*)     Bacteria Urine Many (*)     Mucous Urine Present (*)     Amorphous Crystals Few (*)     All other components within normal limits   LIPASE - Abnormal; Notable for the following components:    Lipase 42 (*)     All other components within normal limits   COVID-19 VIRUS (CORONAVIRUS) BY PCR   INR   NT PROBNP INPATIENT   CARDIAC CONTINUOUS MONITORING   ABO/RH TYPE AND SCREEN   BLOOD CULTURE   URINE CULTURE AEROBIC BACTERIAL   BLOOD CULTURE     XR Chest Port 1 View   Preliminary Result   IMPRESSION: No acute disease.      Abd/pelvis CT,  IV  contrast only TRAUMA / AAA    (Results Pending)   CT Chest (PE) Abdomen Pelvis w Contrast    (Results Pending)       Treatments provided: Tylenol, Zofran x 2, Pepcid, Merrem  Family Comments: Daughter updated about status/admission. Lizzy @ [phone]  OBS brochure/video discussed/provided to patient:  N/A  ED Medications:   Medications   meropenem (MERREM) 1 g vial to attach to  mL bag (has no administration in time range)   iopamidol (ISOVUE-370) solution 500 mL (has no administration in time range)   CT scan flush (has no administration in time range)   0.9% sodium chloride BOLUS (0 mLs Intravenous Stopped 5/6/20 1136)   ondansetron (ZOFRAN) injection 4 mg (4 mg Intravenous Given 5/6/20 0946)   acetaminophen (TYLENOL) tablet 650 mg (650 mg Oral Given 5/6/20 1034)   ondansetron (ZOFRAN) injection 4 mg (4 mg Intravenous Given 5/6/20 1045)   famotidine (PEPCID) injection 20 mg (20 mg Intravenous Given 5/6/20 1101)     Drips infusing:  Yes- Merrem.   For the majority of the shift, the patient's behavior Green. Interventions performed were N/A.    Sepsis treatment initiated: No       ED Nurse Name/Phone Number: Bonita Card RN,   12:07 PM

## 2020-05-06 NOTE — ED TRIAGE NOTES
Pt reports 1 day of NV, dizziness, and weakness. EMS placed pt on 3L O2 NC en route because her sats were low 90's. Pt sating 95% on RA on arrival. Hx MI and GI bleed. Pt also endorses low back pain but states this is chronic. No fevers. A&Ox3. ABC's intact.

## 2020-05-06 NOTE — ANESTHESIA POSTPROCEDURE EVALUATION
Patient: Daya Ignacio    Procedure(s):  CYSTOSCOPY, WITH LEFT URETERAL STENT INSERTION    Diagnosis:Ureteral stone [N20.1]  Diagnosis Additional Information: No value filed.    Anesthesia Type:  General    Note:  Anesthesia Post Evaluation    Patient location during evaluation: PACU  Patient participation: Able to fully participate in evaluation  Level of consciousness: awake and alert  Pain management: adequate  multimodal analgesia used between 6 hours prior to anesthesia start to PACU dischargeAirway patency: patent  Cardiovascular status: acceptable  Respiratory status: acceptable  two or more mitigation strategies used for obstructive sleep apneaHydration status: acceptable  PONV: none     Anesthetic complications: None          Last vitals:  Vitals:    05/06/20 1611 05/06/20 1707 05/06/20 1710   BP: (!) 80/42 (!) 88/46 (!) 87/50   Pulse:   97   Resp: 12 14 18   Temp: 97.8  F (36.6  C) 99.3  F (37.4  C)    SpO2:  94% 94%         Electronically Signed By: Mervin Robertson MD  May 6, 2020  5:13 PM

## 2020-05-06 NOTE — PHARMACY-ADMISSION MEDICATION HISTORY
Admission medication history interview status for this patient is complete. See [facility] admission navigator for allergy information, prior to admission medications and immunization status.     Medication history interview done via telephone during Covid-19 pandemic, indicate source(s): Patient resides at [facility] in [place] ( [phone])  Medication history resources (including written lists, pill bottles, clinic record): MAR from [place]  Primary pharmacy: [facility]    Changes made to PTA medication list:  Added: cephalexin  Deleted: estradiol, lidocaine patch, multivitamin  Changed: acetaminophen PRN --> scheduled + PRN, amlodipine 5mg --> 10mg, lisinopril 20mg --> 30mg, oxycodone prn --> 2.5mg TID    Actions taken by pharmacist (provider contacted, etc):None     Additional medication history information:None    Medication reconciliation/reorder completed by provider prior to medication history?  N   (Y/N)     For patients on insulin therapy: N  (Y/N)        Prior to Admission medications    Medication Sig Last Dose Taking? Auth Provider   acetaminophen (TYLENOL) 500 MG tablet Take 500 mg by mouth 3 times daily 5/5/2020 at pm Yes Unknown, Entered By History   acetaminophen (TYLENOL) 500 MG tablet Take 500 mg by mouth 3 times daily as needed for mild pain Past Month at Unknown time Yes Unknown, Entered By History   amLODIPine (NORVASC) 10 MG tablet Take 10 mg by mouth daily 5/5/2020 at am Yes Unknown, Entered By History   Calcium Carbonate (CALCIUM 600 PO) Take 1 tablet by mouth 2 times daily  5/5/2020 at pm Yes Reported, Patient   carvedilol (COREG) 25 MG tablet Take 25 mg by mouth At Bedtime 5/5/2020 at pm Yes Unknown, Entered By History   carvedilol (COREG) 25 MG tablet Take 12.5 mg by mouth every morning  5/5/2020 at am Yes Reported, Patient   cephALEXin (KEFLEX) 250 MG capsule Take 250 mg by mouth daily UTI prophylaxis 5/5/2020 at am Yes Unknown, Entered By History    Cholecalciferol (VITAMIN D3) 2000 UNITS CAPS Take 2,000 Units by mouth daily (with dinner) 5/5/2020 at 1700 Yes Unknown, Entered By History   cloNIDine (CATAPRES) 0.1 MG tablet Take 0.1 mg by mouth 2 times daily 5/6/2020 at am Yes Unknown, Entered By History   diclofenac (VOLTAREN) 1 % topical gel Apply 2 g topically 3 times daily Apply to right knee 5/5/2020 at 1400 Yes Reported, Patient   docusate sodium (COLACE) 100 MG capsule Take 1 capsule (100 mg) by mouth At Bedtime 5/5/2020 at pm Yes Seema Quintero APRN CNP   gabapentin (NEURONTIN) 100 MG capsule Take 2 capsules (200 mg) by mouth 2 times daily 5/5/2020 at pm Yes Amanda Kenyon MD   isosorbide dinitrate (ISORDIL) 10 MG tablet Take 1 tablet (10 mg) by mouth 3 times daily 5/5/2020 at pm Yes Marcy Soares PA-C   lisinopril (ZESTRIL) 30 MG tablet Take 30 mg by mouth daily 5/5/2020 at am Yes Unknown, Entered By History   Multiple Vitamins-Minerals (PRESERVISION AREDS) CAPS Take 1 capsule by mouth 2 times daily  5/5/2020 at pm Yes Unknown, Entered By History   omeprazole 20 MG tablet Take 20 mg by mouth daily  5/5/2020 at am Yes Reported, Patient   ondansetron (ZOFRAN-ODT) 4 MG ODT tab Take 1 tablet (4 mg) by mouth every 6 hours as needed for nausea or vomiting Past Month at Unknown time Yes Gisela Mehta PA-C   oxyCODONE (ROXICODONE) 5 MG tablet Take 2.5 mg by mouth 3 times daily 5/5/2020 at pm Yes Unknown, Entered By History   polyethylene glycol (MIRALAX/GLYCOLAX) packet Take 17 g by mouth daily as needed for constipation Past Month at Unknown time Yes Gisela Mheta PA-C   senna-docusate (SENOKOT-S/PERICOLACE) 8.6-50 MG tablet Take 2 tablets by mouth 2 times daily as needed for constipation Past Month at Unknown time Yes Gisela Mehta PA-C   SENNA-docusate sodium (SENNA S) 8.6-50 MG tablet Take 1 tablet by mouth At Bedtime 5/5/2020 at pm Yes Seema Quintero APRN CNP   sertraline (ZOLOFT) 100 MG tablet Take 1 tablet (100  mg) by mouth daily 5/5/2020 at am Yes Marcy Soares PA-C   vitamin C (ASCORBIC ACID) 500 MG tablet Take 500 mg by mouth daily at 4pm  5/5/2020 at 1700 Yes Reported, Patient

## 2020-05-06 NOTE — PROVIDER NOTIFICATION
Lactic 4.1, PA called via telephone.   Asked to call urology to let them know about the lactic acid to see if they would like to see the pt sooner. Urology consult had not been called yet, consult line called to paged out consult and to send a message with the consult to call the 5th floor when they get the page.

## 2020-05-06 NOTE — ED NOTES
RN spoke with pt in regards to Lizzy calling about an update on her. Pt verbalized that this is her daughter in law and that it is okay for RN to talk with her and update her on results.

## 2020-05-06 NOTE — CONSULTS
Consult Date:  05/06/2020      UROLOGY INPATIENT CONSULTATION      REASON FOR CONSULTATION:  Left ureteral stone and urinary tract infection.      HISTORY OF PRESENT ILLNESS:  Daya Ignacio is an 89-year-old woman who was brought into the Emergency Department today for nausea and vomiting.  She was found to have fevers as well.  She had a COVID test that has already come back negative.  She appeared to have a urinary tract infection on her initial urinalysis, so a CT scan was performed.  She was found to have a large proximal left ureteral stone on her CT scan measuring 7 mm and causing obstruction.  I was consulted regarding the urinary tract infection and her stone.  She has seen my partner, Dr. Carlson previously.  As recently as this past summer, she had a CT scan that showed no evidence of stones and had a normal cystoscopy.  She has had no prior history of stones either.  She had an elevated lactate at this time.  She reports no flank pain or hematuria, just her nausea and vomiting.      PAST MEDICAL HISTORY:  She has had no prior history of kidney stones.  She has chronic urinary tract infections.  She has a history of Clostridium difficile colitis.      PAST SURGICAL HISTORY:  Prior cystoscopy.  No other urologic surgeries.      HOME MEDICATIONS:  Zoloft, Senna, Norvasc, Coreg, Catapres, Colace, Estrace, Neurontin, Prinivil, Prilosec.      ALLERGIES:  SULFA, MACROBID AND PENICILLIN.      SOCIAL HISTORY:  She is a former smoker.      FAMILY HISTORY:  No family history of urologic malignancy or stones.      REVIEW OF SYSTEMS:  She has had fevers and chills.  She has had nausea and vomiting.  She has had overall weakness.  She has had no flank pain.  She has had no dysuria or gross hematuria.  She has no changes in her bowel movements.      PHYSICAL EXAMINATION:   VITAL SIGNS:  She is currently afebrile.  Vital signs stable.   GENERAL:  Alert and oriented, in no acute distress.   HEENT:  Normocephalic and  atraumatic.   RESPIRATORY:  Normal nonlabored breathing.   ABDOMEN:  Soft, nontender, nondistended.      IMAGING STUDIES:  I reviewed CT scan images and there is a large, 7 or 8 mm proximal left ureteral stone causing obstruction, hydronephrosis, hydroureter proximal to this.  No other stones identified.      LABORATORY STUDIES:  Urinalysis shows nitrite positive urine.  Her serum white blood count is slightly low at 3.5.  Serum creatinine 0.94.      IMPRESSION AND PLAN:  The patient is an 89-year-old woman with a proximal left ureteral stone and urinary tract infection.  I recommended urgent stent placement in the operating room today.  I discussed cystoscopy with left-sided stent placement and she wishes to proceed.  She will then need to be readmitted to the hospital for further antibiotic therapy.  Ultimately, she will undergo at least 2 weeks of antibiotic therapy and then will return to the operating room for shock wave lithotripsy or ureteroscopy for stone extraction.         ROZINA PALOMINO MD             D: 2020   T: 2020   MT: HENRIETTA      Name:     MAGALY MCDANIELS   MRN:      [id number]        Account:       [id number]   :      10/11/1930           Consult Date:  2020      Document: [id number]

## 2020-05-06 NOTE — H&P
History and Physical     Daya Ignacio MRN# [id number]   YOB: 1930 Age: 89 year old      Date of Admission:  5/6/2020    Primary care provider: Johann Hall          Assessment and Plan:   Daya Ignacio is a 89 year old female with a PMH significant for ESBL e coli UTI, HTN, HLP, and hx of c diff colitis, who presents with nausea, vomiting and weakness.     1. UTI - weakness, N/V, denies fever or dysuria. UA shows positive LE, large blood, >182 WBCs and >182 RBCs, urine culture pending. Given hx of ESBL e coli, given IV Meropenem in ED, will continue and follow urine cultures. IVFs and supportive cares  Addendum: CT chest/abd/pelvis done in ED, negative for PE, 7 mm prox left ureteral stone noted. Will add Urology consult.   2. Covid-19 rule out - due to nausea, vomiting and hypoxia, NP swab collected. Covid precautions, if negative, continue contact isolation due to hx of ESBL.   Addendum: COVID-19 is negative, ok to remove droplet isolation, continue contact isolation.   3. Acute respiratory failure - likely due to acute infection. Chest clear on CXR, CT chest negative. On 3L O2 via NC. Incentive spirometry and wean O2 as able.   4. HTN - BPs low normal, resume home meds with parameters  5. HLP - not on a statin  6. Hx of c diff colitis    Addendum: pt triggered sepsis protocol and lactic acid returned elevated at 4.1. will give 1L NS bolus then maintenance fluids. Continue IV abx and Urology consult.    Prophylaxis - mechanical   Code status - DNR / DNI  Dispo - admit to inpatient                Chief Complaint:   Nausea, weakness         History of Present Illness:   Daya Ignacio is a 89 year old female with a PMH significant for ESBL e coli UTI, HTN, HLP, and hx of c diff colitis, who presents with nausea, vomiting and weakness. Pt reports 24 hrs of increasing weakness and nausea with vomiting. She denies fever, chills, chest pain, cough, SOB, abd pain, diarrhea or dysuria. She notes  chronic back pain that is mildly worse than normal. She has a hx of ESBL e coli UTIs and states sx are similar. She lives in [facility], denies any known COVID-19 infections in the facility and denies any known ill contacts.   In the ED, VSS. SpO2 did dip to mid 80s while in ED. She was placed on supplemental O2 and titrated up to 10L and then titrated back down to 3L via NC. CMP is unremarkable. CBC shows a mild leukopenia with WBC of 3.5 otherwise unremarkable. Lipase is normal and BNP is normal. INR is 1.13. UA is grossly abnormal with large blood and positive LE with >182 WBCs and RBCs. Urine and blood cultures are pending. CXR clear. CT chest is negative for PE or other acute respiratory process, 7 mm prox L ureteral stone noted with mild hydronephrosis, cholelithiasis and probably ovarian cyst. She was given a 500 mL NS bolus, 650 mg PO Tylenol, 20 mg IV famotidine, 4 mg IV zofran x2, 5 mg PO Oxycodone and 1 g IV Meropenem. Given sx of nausea, vomiting and hypoxia, NP swab done and sent for COVID-19.    Hx obtained by speaking with ED physician, chart review and pt interview.               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 4/16/2014     h/o Clostridium difficile colitis 06/2019     Hypertension      Lumbar compression fracture (H)      Macular degeneration (senile) of retina, unspecified      Upper GI bleed 06/2019     Urinary tract infection due to extended-spectrum beta lactamase (ESBL)-producing Klebsiella 05/2019    resistant to Bactrim               Past Surgical History:     Past Surgical History:   Procedure Laterality Date     Cataract removal NOS Bilateral      CYSTOSCOPY N/A 8/1/2019    Procedure: Exam under anesthesia, video cystopanendoscopy;  Surgeon: Dennis Carlson MD;  Location:  OR     ESOPHAGOSCOPY, GASTROSCOPY, DUODENOSCOPY (EGD), COMBINED N/A 6/11/2019    Procedure: ESOPHAGOGASTRODUODENOSCOPY (EGD);  Surgeon:  Gaurav Degroot MD;  Location:  GI     Hip Pinning procedure Left      Hip replacement NOS Right      OPEN REDUCTION INTERNAL FIXATION TIBIAL PLATEAU Right 8/28/2019    Procedure: Open reduction internal fixation right proximal tibia fracture;  Surgeon: Molina Pardo MD;  Location:  OR     TONSILLECTOMY, ADENOIDECTOMY, COMBINED                 Social History:     Social History     Socioeconomic History     Marital status:      Spouse name: Not on file     Number of children: Not on file     Years of education: Not on file     Highest education level: Not on file   Occupational History     Not on file   Social Needs     Financial resource strain: Not on file     Food insecurity     Worry: Not on file     Inability: Not on file     Transportation needs     Medical: Not on file     Non-medical: Not on file   Tobacco Use     Smoking status: Never Smoker     Smokeless tobacco: Never Used   Substance and Sexual Activity     Alcohol use: No     Drug use: No     Sexual activity: Never   Lifestyle     Physical activity     Days per week: Not on file     Minutes per session: Not on file     Stress: Not on file   Relationships     Social connections     Talks on phone: Not on file     Gets together: Not on file     Attends Confucianist service: Not on file     Active member of club or organization: Not on file     Attends meetings of clubs or organizations: Not on file     Relationship status: Not on file     Intimate partner violence     Fear of current or ex partner: Not on file     Emotionally abused: Not on file     Physically abused: Not on file     Forced sexual activity: Not on file   Other Topics Concern     Parent/sibling w/ CABG, MI or angioplasty before 65F 55M? Not Asked   Social History Narrative     Not on file               Family History:     Family History   Problem Relation Age of Onset     Alzheimer Disease Mother         mild     Cardiovascular Father         heart attack      Neurologic Disorder Brother 83        Parkinson's              Allergies:      Allergies   Allergen Reactions     Atorvastatin Muscle Pain (Myalgia)     Macrobid [Nitrofurantoin Anhydrous] Other (See Comments)     Body aches     Nitrofurantoin Muscle Pain (Myalgia) and Unknown     Body aches       Seasonal Allergies Unknown     Sulfa Drugs Other (See Comments)     Tolerated Bactrim May 2019  Entire family has allergy to Sulfa     Sulfasalazine Unknown               Medications:     Prior to Admission medications    Medication Sig Last Dose Taking? Auth Provider   acetaminophen (TYLENOL) 500 MG tablet Take 500 mg by mouth 3 times daily 5/5/2020 at pm Yes Unknown, Entered By History   acetaminophen (TYLENOL) 500 MG tablet Take 500 mg by mouth 3 times daily as needed for mild pain Past Month at Unknown time Yes Unknown, Entered By History   amLODIPine (NORVASC) 10 MG tablet Take 10 mg by mouth daily 5/5/2020 at am Yes Unknown, Entered By History   Calcium Carbonate (CALCIUM 600 PO) Take 1 tablet by mouth 2 times daily  5/5/2020 at pm Yes Reported, Patient   carvedilol (COREG) 25 MG tablet Take 25 mg by mouth At Bedtime 5/5/2020 at pm Yes Unknown, Entered By History   carvedilol (COREG) 25 MG tablet Take 12.5 mg by mouth every morning  5/5/2020 at am Yes Reported, Patient   cephALEXin (KEFLEX) 250 MG capsule Take 250 mg by mouth daily UTI prophylaxis 5/5/2020 at am Yes Unknown, Entered By History   Cholecalciferol (VITAMIN D3) 2000 UNITS CAPS Take 2,000 Units by mouth daily (with dinner) 5/5/2020 at 1700 Yes Unknown, Entered By History   cloNIDine (CATAPRES) 0.1 MG tablet Take 0.1 mg by mouth 2 times daily 5/6/2020 at am Yes Unknown, Entered By History   diclofenac (VOLTAREN) 1 % topical gel Apply 2 g topically 3 times daily Apply to right knee 5/5/2020 at 1400 Yes Reported, Patient   docusate sodium (COLACE) 100 MG capsule Take 1 capsule (100 mg) by mouth At Bedtime 5/5/2020 at pm Yes Seema Quintero APRN CNP    gabapentin (NEURONTIN) 100 MG capsule Take 2 capsules (200 mg) by mouth 2 times daily 5/5/2020 at pm Yes Amanda Kenyon MD   isosorbide dinitrate (ISORDIL) 10 MG tablet Take 1 tablet (10 mg) by mouth 3 times daily 5/5/2020 at pm Yes Marcy Soares PA-C   lisinopril (ZESTRIL) 30 MG tablet Take 30 mg by mouth daily 5/5/2020 at am Yes Unknown, Entered By History   Multiple Vitamins-Minerals (PRESERVISION AREDS) CAPS Take 1 capsule by mouth 2 times daily  5/5/2020 at pm Yes Unknown, Entered By History   omeprazole 20 MG tablet Take 20 mg by mouth daily  5/5/2020 at am Yes Reported, Patient   ondansetron (ZOFRAN-ODT) 4 MG ODT tab Take 1 tablet (4 mg) by mouth every 6 hours as needed for nausea or vomiting Past Month at Unknown time Yes Gisela Mehta PA-C   oxyCODONE (ROXICODONE) 5 MG tablet Take 2.5 mg by mouth 3 times daily 5/5/2020 at pm Yes Unknown, Entered By History   polyethylene glycol (MIRALAX/GLYCOLAX) packet Take 17 g by mouth daily as needed for constipation Past Month at Unknown time Yes Gisela Mehta PA-C   senna-docusate (SENOKOT-S/PERICOLACE) 8.6-50 MG tablet Take 2 tablets by mouth 2 times daily as needed for constipation Past Month at Unknown time Yes Gisela Mehta PA-C   SENNA-docusate sodium (SENNA S) 8.6-50 MG tablet Take 1 tablet by mouth At Bedtime 5/5/2020 at pm Yes Seema Quintero APRN CNP   sertraline (ZOLOFT) 100 MG tablet Take 1 tablet (100 mg) by mouth daily 5/5/2020 at am Yes Marcy Soares PA-C   vitamin C (ASCORBIC ACID) 500 MG tablet Take 500 mg by mouth daily at 4pm  5/5/2020 at 1700 Yes Reported, Patient              Review of Systems:   A Comprehensive greater than 10 system review of systems was carried out.  Pertinent positives and negatives are noted above.  Otherwise negative for contributory information.     Review Of Systems  Skin: negative  Eyes: negative  Ears/Nose/Throat: negative  Respiratory: No shortness of breath, dyspnea on exertion,  cough, or hemoptysis  Cardiovascular: negative  Gastrointestinal: negative  Genitourinary: negative  Musculoskeletal: negative  Neurologic: negative  Psychiatric: negative  Hematologic/Lymphatic/Immunologic: negative  Endocrine: negative             Physical Exam:   Blood pressure 94/45, pulse 80, temperature 98.4  F (36.9  C), temperature source Oral, resp. rate 22, SpO2 93 %, not currently breastfeeding.  Exam:  GENERAL:  Comfortable.  PSYCH: pleasant, oriented, No acute distress.  HEENT:  Atraumatic, normocephalic. Normal conjunctiva, normal hearing, and oropharynx is normal.  NECK:  Supple, no neck vein distention  HEART:  Normal S1, S2 with murmur, no pericardial rub, gallops or S3 or S4.  LUNGS:  Bibasilar crackles, normal Respiratory effort. No wheezing  GI:  Soft, normal bowel sounds. Non-tender, non distended.   EXTREMITIES:  No pedal edema, +2 pulses bilateral and equal.  SKIN:  Dry to touch, No rash, wound or ulcerations.  NEUROLOGIC:  CN 2-12 intact, BL 5/5 symmetric upper and lower extremity strength, sensation is intact with no focal deficits.               Data:     Recent Labs   Lab 05/06/20  0928   NTBNPI 332     Recent Labs   Lab 05/06/20  0928   WBC 3.5*   HGB 13.5   HCT 42.3   MCV 99        Recent Labs   Lab 05/06/20  0928      POTASSIUM 3.8   CHLORIDE 105   CO2 25   ANIONGAP 9   GLC 99   BUN 24   CR 0.94   GFRESTIMATED 54*   GFRESTBLACK 62   DAVE 9.9   PROTTOTAL 7.3   ALBUMIN 3.7   BILITOTAL 1.3   ALKPHOS 113   AST 15   ALT 12     Recent Labs   Lab 05/06/20  0928   INR 1.13     Recent Labs   Lab 05/06/20  0928   LIPASE 42*     Recent Labs   Lab 05/06/20  1035   COLOR Brown   APPEARANCE Cloudy   URINEGLC Negative   URINEBILI Negative   URINEKETONE Negative   SG 1.010   UBLD Large*   URINEPH 7.5*   PROTEIN 30*   NITRITE Negative   LEUKEST Positive*   RBCU >182*   WBCU >182*       Recent Results (from the past 48 hour(s))   XR Chest Port 1 View    Narrative    CHEST ONE VIEW  5/6/2020  11:13 AM     HISTORY: Malaise, GI symptoms.    COMPARISON: August 21, 2019      Impression    IMPRESSION: No acute disease.    WYATT MOORE MD   CT Chest (PE) Abdomen Pelvis w Contrast    Narrative    CT CHEST PE, ABDOMEN & PELVIS WITH CONTRAST May 6, 2020 12:19 PM    HISTORY: Intractable vomiting, central back and abdominal pain.    TECHNIQUE: CT scan obtained of the chest, abdomen, and pelvis with  oral and IV contrast. 60mL Isovue-370 IV injected. Radiation dose for  this scan was reduced using automated exposure control, adjustment of  the mA and/or kV according to patient size, or iterative  reconstruction technique.    COMPARISON:  Chest x-ray on same day earlier.    FINDINGS:  Chest/mediastinum: No evidence of pulmonary emboli. The main pulmonary  artery is mildly enlarged measuring 3 cm, this can be seen with  pulmonary hypertension. No pericardial effusion. Enlarged heterogenous  thyroid gland with retrotracheal extension of the right thyroid lobe.  Moderate atherosclerotic vascular calcification of the coronary  arteries and thoracic aorta.    Lung/pleura: No pleural effusion or pneumothorax. Bibasilar pulmonary  opacities, likely atelectasis.    Abdomen/pelvis: No suspicious focal hepatic lesion. Mild periportal  edema. Cholelithiasis without CT evidence of acute cholecystitis. No  splenomegaly. No main pancreatic ductal dilatation or definite solid  pancreatic mass. No adrenal nodules. There is 7 mm stone in the  proximal left ureter with associated mild hydroureter/hydronephrosis.  There is mild mucosal enhancement of the left renal collecting system,  nonspecific, can be seen with underlying infection/inflammation. There  is 7 mm stone in the upper pole of the left kidney (series 9 image  52). No right kidney stones.    No abnormally dilated bowel loops. No significant free fluid in the  abdomen or pelvis. No free peritoneal or portal venous gas. There is  2.9 cm left adnexal cyst (series 9 image  134). Moderate predominantly  aortobiiliac atherosclerotic vascular calcification. Colonic  diverticulosis without CT evidence of acute diverticulitis.    Bones and soft tissue: Postsurgical changes of right hip arthroplasty  and proximal left femoral ORIF diffuse osteopenia with multiple  compression deformities of the thoracolumbar spine including T8, T11,  L1, L2 and L3.      Impression    IMPRESSION:   1. 7 mm stone in the proximal aspect of the left ureter with  associated mild hydroureter/hydronephrosis. In addition, there is mild  mucosal enhancement of the left renal collecting system, nonspecific,  can be seen with underlying urinary tract infection.  2. Cholelithiasis without CT evidence of acute cholecystitis.  3. 2.9 cm left adnexal cyst, likely ovarian, recommend further  evaluation with pelvic ultrasound.  4. Enlarged heterogenous thyroid parenchyma with mild retrotracheal  extension of the right thyroid lobe.         Nicole Hernandez PA-C    This patient was seen and discussed with Dr. Byrd who agrees with the current plans as outlined above.

## 2020-05-06 NOTE — ED NOTES
Assisted with Pt. Ambulance triage Applied monitoring devices (EKG, BP, and pulse ox) onto Patient.

## 2020-05-06 NOTE — PLAN OF CARE
Patient's COVID-19 results came back negative.    Bedside nurse Shahnaz Finney RN aware. Provider MARGARITA Cota aware of results.    Per provider's note 5/6/20 at 1617 will discontinue droplet precautions but remain on contact d/t history of ESBL and current presentation with UTI. Patient will transfer to room 545 after procedure.

## 2020-05-06 NOTE — ED NOTES
[facility]  ED Nurse Handoff Report    Daya Ignacio is a 89 year old female   ED Chief complaint: Nausea & Vomiting  . ED Diagnosis:   Final diagnoses:   Acute cystitis with hematuria   History of ESBL E. coli infection   Suspected Covid-19 Virus Infection     Allergies:   Allergies   Allergen Reactions     Atorvastatin Muscle Pain (Myalgia)     Macrobid [Nitrofurantoin Anhydrous] Other (See Comments)     Body aches     Nitrofurantoin Muscle Pain (Myalgia) and Unknown     Body aches       Seasonal Allergies Unknown     Sulfa Drugs Other (See Comments)     Tolerated Bactrim May 2019  Entire family has allergy to Sulfa     Sulfasalazine Unknown       Code Status: DNR / DNI  Activity level - Baseline/Home:  Assist X 1. Activity Level - Current:   Assist X 2. Lift room needed: No. Bariatric: No   Needed: No   Isolation: Yes. Infection:   COVID rule out.     Vital Signs:   Vitals:    05/06/20 0915 05/06/20 1000 05/06/20 1015 05/06/20 1055   BP: (!) 120/102 120/50 134/57    Pulse: 95 92 92    Resp:       Temp:       TempSrc:       SpO2: 95% 91% 98% 90%       Cardiac Rhythm:  ,   Cardiac  Cardiac Rhythm: Normal sinus rhythm  Pain level: 0-10 Pain Scale: 8  Patient confused: No. Patient Falls Risk: Yes.   Elimination Status: Has voided with straight cath insertion. Pt states she is incontinent of urine at baseline.  Patient Report - Initial Complaint: Pt reports 1 day of NV, dizziness, and weakness. EMS placed pt on 3L O2 NC en route because her sats were low 90's. Pt sating 95% on RA on arrival. Hx MI and GI bleed. Pt also endorses low back pain but states this is chronic. No fevers. A&Ox3. ABC's intact. Focused Assessment: Gastrointestinal - GI WDL: -WDL except; nausea and vomiting  Nausea/Vomiting Signs/Symptoms: nausea intermittent (Pt c/o nausea but has not had emesis here in the ED.) Last Bowel Movement: 05/06/20 (En route per EMS) Gastrointestinal Comment: Pt c/o intermittent NV since  "yesterday. Pt also reports difficulty with having a BM. Per EMS, pt had a BM en route and felt very lightheaded and like she was \"going to pass out.\" On arrival, pt had a BM again without difficulty.    Tests Performed: IV labs, imaging. Abnormal Results:   Labs Ordered and Resulted from Time of ED Arrival Up to the Time of Departure from the ED   CBC WITH PLATELETS DIFFERENTIAL - Abnormal; Notable for the following components:       Result Value    WBC 3.5 (*)     Nucleated RBCs 1 (*)     Absolute Lymphocytes 0.2 (*)     All other components within normal limits   COMPREHENSIVE METABOLIC PANEL - Abnormal; Notable for the following components:    GFR Estimate 54 (*)     All other components within normal limits   ROUTINE UA WITH MICROSCOPIC - Abnormal; Notable for the following components:    Blood Urine Large (*)     pH Urine 7.5 (*)     Protein Albumin Urine 30 (*)     Leukocyte Esterase Urine Positive (*)     WBC Urine >182 (*)     RBC Urine >182 (*)     WBC Clumps Present (*)     Bacteria Urine Many (*)     Mucous Urine Present (*)     Amorphous Crystals Few (*)     All other components within normal limits   LIPASE - Abnormal; Notable for the following components:    Lipase 42 (*)     All other components within normal limits   COVID-19 VIRUS (CORONAVIRUS) BY PCR   INR   NT PROBNP INPATIENT   CARDIAC CONTINUOUS MONITORING   ABO/RH TYPE AND SCREEN   BLOOD CULTURE   URINE CULTURE AEROBIC BACTERIAL   BLOOD CULTURE     XR Chest Port 1 View   Preliminary Result   IMPRESSION: No acute disease.      Abd/pelvis CT,  IV  contrast only TRAUMA / AAA    (Results Pending)     Treatments provided: IV fluids and meds, abx  Family Comments: Lizzy, daughter-in-law aware of pt admission. [phone]  OBS brochure/video discussed/provided to patient:  N/A  ED Medications:   Medications   meropenem (MERREM) 1 g vial to attach to  mL bag (has no administration in time range)   0.9% sodium chloride BOLUS (0 mLs Intravenous " Stopped 5/6/20 1136)   ondansetron (ZOFRAN) injection 4 mg (4 mg Intravenous Given 5/6/20 0946)   acetaminophen (TYLENOL) tablet 650 mg (650 mg Oral Given 5/6/20 1034)   ondansetron (ZOFRAN) injection 4 mg (4 mg Intravenous Given 5/6/20 1045)   famotidine (PEPCID) injection 20 mg (20 mg Intravenous Given 5/6/20 1101)     Drips infusing:  No  For the majority of the shift, the patient's behavior Green. Interventions performed were None.    Sepsis treatment initiated: No       ED Nurse Name/Phone Number: Gisela Stroud RN,   11:38 AM    RECEIVING UNIT ED HANDOFF REVIEW    Above ED Nurse Handoff Report was reviewed:Yes  Reviewed by: Saman Olivarez RN on May 6, 2020 at 12:58 PM

## 2020-05-06 NOTE — ADDENDUM NOTE
Addendum  created 05/06/20 0043 by Mervin Robertson MD    Order list changed, Order sets accessed

## 2020-05-06 NOTE — ANESTHESIA CARE TRANSFER NOTE
Patient: Caitlinle Freedman    Procedure(s):  CYSTOSCOPY, WITH LEFT URETERAL STENT INSERTION    Diagnosis: Ureteral stone [N20.1]  Diagnosis Additional Information: No value filed.    Anesthesia Type:   General     Note:  Airway :Blow-by  Patient transferred to:PACU  Handoff Report: Identifed the Patient, Identified the Reponsible Provider, Reviewed the pertinent medical history, Discussed the surgical course, Reviewed Intra-OP anesthesia mangement and issues during anesthesia, Set expectations for post-procedure period and Allowed opportunity for questions and acknowledgement of understanding      Vitals: (Last set prior to Anesthesia Care Transfer)    CRNA VITALS  5/6/2020 1634 - 5/6/2020 1712      5/6/2020             Pulse:  101    SpO2:  95 %                Electronically Signed By: KARTHIK Lynch CRNA  May 6, 2020  5:12 PM

## 2020-05-06 NOTE — ED NOTES
RN spoke with Lizzy, pt's daughter in law, in regards to pt's care and plan for admission.     [phone]

## 2020-05-07 ENCOUNTER — APPOINTMENT (OUTPATIENT)
Dept: GENERAL RADIOLOGY | Facility: CLINIC | Age: 85
DRG: 854 | End: 2020-05-07
Attending: UROLOGY
Payer: COMMERCIAL

## 2020-05-07 LAB
ANION GAP SERPL CALCULATED.3IONS-SCNC: 12 MMOL/L (ref 3–14)
BASOPHILS # BLD AUTO: 0.1 10E9/L (ref 0–0.2)
BASOPHILS NFR BLD AUTO: 0.2 %
BUN SERPL-MCNC: 28 MG/DL (ref 7–30)
CALCIUM SERPL-MCNC: 7.9 MG/DL (ref 8.5–10.1)
CHLORIDE SERPL-SCNC: 109 MMOL/L (ref 94–109)
CO2 SERPL-SCNC: 18 MMOL/L (ref 20–32)
CREAT SERPL-MCNC: 0.93 MG/DL (ref 0.52–1.04)
DIFFERENTIAL METHOD BLD: ABNORMAL
EOSINOPHIL # BLD AUTO: 0.5 10E9/L (ref 0–0.7)
EOSINOPHIL NFR BLD AUTO: 1.9 %
ERYTHROCYTE [DISTWIDTH] IN BLOOD BY AUTOMATED COUNT: 14.6 % (ref 10–15)
GFR SERPL CREATININE-BSD FRML MDRD: 54 ML/MIN/{1.73_M2}
GLUCOSE SERPL-MCNC: 85 MG/DL (ref 70–99)
HCT VFR BLD AUTO: 32.2 % (ref 35–47)
HGB BLD-MCNC: 10.3 G/DL (ref 11.7–15.7)
IMM GRANULOCYTES # BLD: 1 10E9/L (ref 0–0.4)
IMM GRANULOCYTES NFR BLD: 3.4 %
LACTATE BLD-SCNC: 4.6 MMOL/L (ref 0.7–2)
LYMPHOCYTES # BLD AUTO: 0.5 10E9/L (ref 0.8–5.3)
LYMPHOCYTES NFR BLD AUTO: 1.7 %
MCH RBC QN AUTO: 31.8 PG (ref 26.5–33)
MCHC RBC AUTO-ENTMCNC: 32 G/DL (ref 31.5–36.5)
MCV RBC AUTO: 99 FL (ref 78–100)
MONOCYTES # BLD AUTO: 0.9 10E9/L (ref 0–1.3)
MONOCYTES NFR BLD AUTO: 3.2 %
NEUTROPHILS # BLD AUTO: 25.4 10E9/L (ref 1.6–8.3)
NEUTROPHILS NFR BLD AUTO: 89.6 %
NRBC # BLD AUTO: 0 10*3/UL
NRBC BLD AUTO-RTO: 0 /100
PLATELET # BLD AUTO: 81 10E9/L (ref 150–450)
PLATELET # BLD EST: ABNORMAL 10*3/UL
POTASSIUM SERPL-SCNC: 3.3 MMOL/L (ref 3.4–5.3)
POTASSIUM SERPL-SCNC: 5 MMOL/L (ref 3.4–5.3)
RBC # BLD AUTO: 3.24 10E12/L (ref 3.8–5.2)
RBC MORPH BLD: ABNORMAL
SODIUM SERPL-SCNC: 139 MMOL/L (ref 133–144)
WBC # BLD AUTO: 28.4 10E9/L (ref 4–11)

## 2020-05-07 PROCEDURE — G0463 HOSPITAL OUTPT CLINIC VISIT: HCPCS

## 2020-05-07 PROCEDURE — 12000000 ZZH R&B MED SURG/OB

## 2020-05-07 PROCEDURE — 80048 BASIC METABOLIC PNL TOTAL CA: CPT | Performed by: INTERNAL MEDICINE

## 2020-05-07 PROCEDURE — 25000132 ZZH RX MED GY IP 250 OP 250 PS 637: Performed by: INTERNAL MEDICINE

## 2020-05-07 PROCEDURE — 25000125 ZZHC RX 250: Performed by: INTERNAL MEDICINE

## 2020-05-07 PROCEDURE — 36415 COLL VENOUS BLD VENIPUNCTURE: CPT | Performed by: INTERNAL MEDICINE

## 2020-05-07 PROCEDURE — 84132 ASSAY OF SERUM POTASSIUM: CPT | Performed by: INTERNAL MEDICINE

## 2020-05-07 PROCEDURE — 99233 SBSQ HOSP IP/OBS HIGH 50: CPT | Performed by: INTERNAL MEDICINE

## 2020-05-07 PROCEDURE — 25800030 ZZH RX IP 258 OP 636: Performed by: INTERNAL MEDICINE

## 2020-05-07 PROCEDURE — 83605 ASSAY OF LACTIC ACID: CPT | Performed by: INTERNAL MEDICINE

## 2020-05-07 PROCEDURE — 25000128 H RX IP 250 OP 636: Performed by: UROLOGY

## 2020-05-07 PROCEDURE — 25000132 ZZH RX MED GY IP 250 OP 250 PS 637: Performed by: UROLOGY

## 2020-05-07 PROCEDURE — 25000128 H RX IP 250 OP 636: Performed by: INTERNAL MEDICINE

## 2020-05-07 PROCEDURE — 74019 RADEX ABDOMEN 2 VIEWS: CPT

## 2020-05-07 PROCEDURE — 85025 COMPLETE CBC W/AUTO DIFF WBC: CPT | Performed by: INTERNAL MEDICINE

## 2020-05-07 RX ORDER — POTASSIUM CL/LIDO/0.9 % NACL 10MEQ/0.1L
10 INTRAVENOUS SOLUTION, PIGGYBACK (ML) INTRAVENOUS
Status: DISCONTINUED | OUTPATIENT
Start: 2020-05-07 | End: 2020-05-12 | Stop reason: HOSPADM

## 2020-05-07 RX ORDER — ACETAMINOPHEN 650 MG/1
650 SUPPOSITORY RECTAL EVERY 4 HOURS PRN
Status: DISCONTINUED | OUTPATIENT
Start: 2020-05-07 | End: 2020-05-12 | Stop reason: HOSPADM

## 2020-05-07 RX ORDER — POTASSIUM CHLORIDE 7.45 MG/ML
10 INJECTION INTRAVENOUS
Status: DISCONTINUED | OUTPATIENT
Start: 2020-05-07 | End: 2020-05-12 | Stop reason: HOSPADM

## 2020-05-07 RX ORDER — SODIUM CHLORIDE 9 MG/ML
INJECTION, SOLUTION INTRAVENOUS CONTINUOUS
Status: DISCONTINUED | OUTPATIENT
Start: 2020-05-07 | End: 2020-05-07

## 2020-05-07 RX ORDER — POTASSIUM CHLORIDE 1.5 G/1.58G
20-40 POWDER, FOR SOLUTION ORAL
Status: DISCONTINUED | OUTPATIENT
Start: 2020-05-07 | End: 2020-05-12 | Stop reason: HOSPADM

## 2020-05-07 RX ORDER — KETOROLAC TROMETHAMINE 15 MG/ML
15 INJECTION, SOLUTION INTRAMUSCULAR; INTRAVENOUS ONCE
Status: COMPLETED | OUTPATIENT
Start: 2020-05-07 | End: 2020-05-07

## 2020-05-07 RX ORDER — POTASSIUM CHLORIDE 29.8 MG/ML
20 INJECTION INTRAVENOUS
Status: DISCONTINUED | OUTPATIENT
Start: 2020-05-07 | End: 2020-05-07

## 2020-05-07 RX ORDER — POTASSIUM CHLORIDE 1500 MG/1
20-40 TABLET, EXTENDED RELEASE ORAL
Status: DISCONTINUED | OUTPATIENT
Start: 2020-05-07 | End: 2020-05-12 | Stop reason: HOSPADM

## 2020-05-07 RX ORDER — VANCOMYCIN HYDROCHLORIDE 125 MG/1
125 CAPSULE ORAL 2 TIMES DAILY
Status: DISCONTINUED | OUTPATIENT
Start: 2020-05-07 | End: 2020-05-12 | Stop reason: HOSPADM

## 2020-05-07 RX ORDER — ACETAMINOPHEN 325 MG/1
650 TABLET ORAL EVERY 4 HOURS PRN
Status: DISCONTINUED | OUTPATIENT
Start: 2020-05-07 | End: 2020-05-12 | Stop reason: HOSPADM

## 2020-05-07 RX ADMIN — SODIUM CHLORIDE 500 ML: 9 INJECTION, SOLUTION INTRAVENOUS at 05:25

## 2020-05-07 RX ADMIN — GABAPENTIN 200 MG: 100 CAPSULE ORAL at 08:35

## 2020-05-07 RX ADMIN — VANCOMYCIN HYDROCHLORIDE 125 MG: 125 CAPSULE ORAL at 20:42

## 2020-05-07 RX ADMIN — SODIUM CHLORIDE: 9 INJECTION, SOLUTION INTRAVENOUS at 00:56

## 2020-05-07 RX ADMIN — KETOROLAC TROMETHAMINE 15 MG: 15 INJECTION, SOLUTION INTRAMUSCULAR; INTRAVENOUS at 08:30

## 2020-05-07 RX ADMIN — POTASSIUM CHLORIDE 20 MEQ: 1.5 FOR SOLUTION ORAL at 16:08

## 2020-05-07 RX ADMIN — ONDANSETRON 4 MG: 4 TABLET, ORALLY DISINTEGRATING ORAL at 12:46

## 2020-05-07 RX ADMIN — ACETAMINOPHEN 650 MG: 325 TABLET, FILM COATED ORAL at 13:34

## 2020-05-07 RX ADMIN — FAMOTIDINE 20 MG: 10 INJECTION INTRAVENOUS at 09:02

## 2020-05-07 RX ADMIN — MEROPENEM 1 G: 1 INJECTION, POWDER, FOR SOLUTION INTRAVENOUS at 02:56

## 2020-05-07 RX ADMIN — MEROPENEM 1 G: 1 INJECTION, POWDER, FOR SOLUTION INTRAVENOUS at 20:37

## 2020-05-07 RX ADMIN — MEROPENEM 1 G: 1 INJECTION, POWDER, FOR SOLUTION INTRAVENOUS at 12:01

## 2020-05-07 RX ADMIN — POTASSIUM CHLORIDE 40 MEQ: 1.5 FOR SOLUTION ORAL at 12:03

## 2020-05-07 RX ADMIN — ONDANSETRON 4 MG: 2 INJECTION INTRAMUSCULAR; INTRAVENOUS at 04:35

## 2020-05-07 RX ADMIN — ACETAMINOPHEN 650 MG: 325 TABLET, FILM COATED ORAL at 02:54

## 2020-05-07 RX ADMIN — SERTRALINE HYDROCHLORIDE 100 MG: 100 TABLET ORAL at 08:35

## 2020-05-07 RX ADMIN — OMEPRAZOLE 20 MG: 20 CAPSULE, DELAYED RELEASE ORAL at 08:35

## 2020-05-07 RX ADMIN — VANCOMYCIN HYDROCHLORIDE 125 MG: 125 CAPSULE ORAL at 09:02

## 2020-05-07 ASSESSMENT — ACTIVITIES OF DAILY LIVING (ADL)
ADLS_ACUITY_SCORE: 19

## 2020-05-07 NOTE — PROGRESS NOTES
[facility]  Hospitalist Progress Note  Prakash Byrd MD, MD 05/07/2020  (Text Page)  Reason for Stay (Diagnosis): Severe sepsis with gram-negative bacteremia, secondary to underlying UTI complicated with kidney stone         Assessment and Plan:      Summary of Stay: Daya Ignacio is a 89 year old female with known prior history of ESBL E. coli UTI, hypertension, dyslipidemia, admitted on 5/6/2020 with worsening generalized weakness, accompanied with nausea and vomiting.    Problem List:   1. Severe sepsis secondary to underlying UTI with gram-negative rods bacteremia  2. Left ureteral stone, status post ureteral stent placement last 5/6/2020  3.   History of C. difficile colitis back in June 2019  4.   History of hypertension  5.   Physical deconditioning  6.  Thrombocytopenia sepsis related    Continue inpatient care as she remain high risk for clinical deterioration.  Currently receiving IV systemic antibiotics of meropenem with prior history of ESBL E. Coli.  Will await identification and sensitivities of her isolated bacteremia and urine cultures.  Requested formal ID service input and evaluation  Urology following with us.  Highly appreciate prompt input earlier and tolerated ureteral stent placement.  Plans for shockwave lithotripsy or ureteroscopy for stone extraction as outpatient after finishing antibiotic course  IV fluid support if able given earlier soft blood pressure levels.  However close monitoring as patient is also at risk for volume overload  As needed APAP.  Antiemetics as needed.      DVT Prophylaxis: Pneumatic Compression Devices  Code Status: DNR / DNI  Discharge Dispo: Hopeful for home discharge  Estimated Disch Date / # of Days until Disch: Still requiring inpatient care at least in the next 2-3 more days        Interval History (Subjective):      Continuing care today.  Seen and examined.  Case discussed with nursing service was gracious enough to present at bedside  during exam.  Family updated over the phone this morning.  Still with intermittent episodes of hypotension.  Nausea but no vomiting.  I found her trying to tolerate her food tray.  Afebrile this morning  Awake, alert, cooperative  Still complaining of intermittent flank and back pain  No reported diarrhea.                Physical Exam:      Last Vital Signs:  /54   Pulse 89   Temp 97.5  F (36.4  C) (Oral)   Resp 20   SpO2 92%     I/O last 3 completed shifts:  In: 1950 [P.O.:550; I.V.:1400]  Out: 325 [Urine:325]  Wt Readings from Last 1 Encounters:   11/20/19 56.5 kg (124 lb 9.6 oz)     There were no vitals filed for this visit.    Constitutional: Awake, alert, cooperative, no apparent distress   Respiratory:  Fair air entry, mild fine crackles on both bases   Cardiovascular: Regular rate and rhythm, normal S1 and S2, and no murmur noted   Abdomen: Normal bowel sounds, soft, non-distended, non-tender   Skin: No rashes, no cyanosis, dry to touch   Neuro: Alert and oriented x2, no weakness, spontaneous and coherent speech   Extremities: No edema, normal range of motion   Other(s): Euthymic mood, not agitated       All other systems: Negative          Medications:      All current medications were reviewed with changes reflected in problem list.         Data:      All new lab and imaging data was reviewed.   Labs:  Recent Labs   Lab 05/06/20  1231 05/06/20  1035 05/06/20  0948   CULT Cultured on the 1st day of incubation:  Gram negative rods  *  Critical Value/Significant Value, preliminary result only, called to and read back by  Paty Maddox RN at 0439 on 5.7.20 by SS.   PENDING Cultured on the 1st day of incubation:  Gram negative rods  *  Critical Value/Significant Value, preliminary result only, called to and read back by  Roxanna Small RN at 0045 on 5.7.20 by SS.    (Note)  POSITIVE for CITROBACTER SPECIES by Xamplified multiplex nucleic acid  test. Final identification and antimicrobial  susceptibility testing  will be verified by standard methods.    Specimen tested with Verigene multiplex, gram-negative blood culture  nucleic acid test for the following targets: Acinetobacter sp.,  Citrobacter sp., Enterobacter sp., Proteus sp., E. coli, K.  pneumoniae/oxytoca, P. aeruginosa, and the following resistance  markers: CTXM, KPC, NDM, VIM, IMP and OXA.    Critical Value/Significant Value called to and read back by Delmy Yañez RN in RMS5 at 0320 5.7.20. AMD       Recent Labs   Lab 05/07/20  0454 05/06/20  0928   WBC 28.4* 3.5*   HGB 10.3* 13.5   HCT 32.2* 42.3   MCV 99 99   PLT 81* 168     Recent Labs   Lab 05/07/20 0454 05/06/20  0928    139   POTASSIUM 3.3* 3.8   CHLORIDE 109 105   CO2 18* 25   ANIONGAP 12 9   GLC 85 99   BUN 28 24   CR 0.93 0.94   GFRESTIMATED 54* 54*   GFRESTBLACK 63 62   DAVE 7.9* 9.9   PROTTOTAL  --  7.3   ALBUMIN  --  3.7   BILITOTAL  --  1.3   ALKPHOS  --  113   AST  --  15   ALT  --  12     No results for input(s): SED, CRP in the last 168 hours.  Recent Labs   Lab 05/07/20 0454 05/06/20  0928   GLC 85 99     Recent Labs   Lab 05/06/20  0928   INR 1.13     No results for input(s): TROPONIN, TROPI, TROPR in the last 168 hours.    Invalid input(s): TROP, TROPONINIES  Recent Labs   Lab 05/06/20  1035   COLOR Brown   APPEARANCE Cloudy   URINEGLC Negative   URINEBILI Negative   URINEKETONE Negative   SG 1.010   UBLD Large*   URINEPH 7.5*   PROTEIN 30*   NITRITE Negative   LEUKEST Positive*   RBCU >182*   WBCU >182*      Imaging:   Results for orders placed or performed during the hospital encounter of 05/06/20   XR Chest Port 1 View    Narrative    CHEST ONE VIEW  5/6/2020 11:13 AM     HISTORY: Malaise, GI symptoms.    COMPARISON: August 21, 2019      Impression    IMPRESSION: No acute disease.    WYATT MOORE MD   CT Chest (PE) Abdomen Pelvis w Contrast    Narrative    CT CHEST PE, ABDOMEN & PELVIS WITH CONTRAST May 6, 2020 12:19 PM    HISTORY: Intractable vomiting,  central back and abdominal pain.    TECHNIQUE: CT scan obtained of the chest, abdomen, and pelvis with  oral and IV contrast. 60mL Isovue-370 IV injected. Radiation dose for  this scan was reduced using automated exposure control, adjustment of  the mA and/or kV according to patient size, or iterative  reconstruction technique.    COMPARISON:  Chest x-ray on same day earlier.    FINDINGS:  Chest/mediastinum: No evidence of pulmonary emboli. The main pulmonary  artery is mildly enlarged measuring 3 cm, this can be seen with  pulmonary hypertension. No pericardial effusion. Enlarged heterogenous  thyroid gland with retrotracheal extension of the right thyroid lobe.  Moderate atherosclerotic vascular calcification of the coronary  arteries and thoracic aorta.    Lung/pleura: No pleural effusion or pneumothorax. Bibasilar pulmonary  opacities, likely atelectasis.    Abdomen/pelvis: No suspicious focal hepatic lesion. Mild periportal  edema. Cholelithiasis without CT evidence of acute cholecystitis. No  splenomegaly. No main pancreatic ductal dilatation or definite solid  pancreatic mass. No adrenal nodules. There is 7 mm stone in the  proximal left ureter with associated mild hydroureter/hydronephrosis.  There is mild mucosal enhancement of the left renal collecting system,  nonspecific, can be seen with underlying infection/inflammation. There  is 7 mm stone in the upper pole of the left kidney (series 9 image  52). No right kidney stones.    No abnormally dilated bowel loops. No significant free fluid in the  abdomen or pelvis. No free peritoneal or portal venous gas. There is  2.9 cm left adnexal cyst (series 9 image 134). Moderate predominantly  aortobiiliac atherosclerotic vascular calcification. Colonic  diverticulosis without CT evidence of acute diverticulitis.    Bones and soft tissue: Postsurgical changes of right hip arthroplasty  and proximal left femoral ORIF diffuse osteopenia with multiple  compression  deformities of the thoracolumbar spine including T8, T11,  L1, L2 and L3.      Impression    IMPRESSION:   1. 7 mm stone in the proximal aspect of the left ureter with  associated mild hydroureter/hydronephrosis. In addition, there is mild  mucosal enhancement of the left renal collecting system, nonspecific,  can be seen with underlying urinary tract infection.  2. Cholelithiasis without CT evidence of acute cholecystitis.  3. 2.9 cm left adnexal cyst, likely ovarian, recommend further  evaluation with pelvic ultrasound.  4. Enlarged heterogenous thyroid parenchyma with mild retrotracheal  extension of the right thyroid lobe.    TAWANNA NUNEZ MD   XR Surgery BORIS L/T 5 Min Fluoro w Stills    Narrative    This exam was marked as non-reportable because it will not be read by a   radiologist or a [facility] non-radiologist provider.

## 2020-05-07 NOTE — PROGRESS NOTES
[facility] Nurse Inpatient Skin Assessment     Assessment of:   Groin skin folds        Data:   Patient History:   89 year old female with known prior history of ESBL E. coli UTI, hypertension, dyslipidemia, admitted on 5/6/2020 with worsening generalized weakness, accompanied with nausea and vomiting.          Moisture Management:  Diaper    Catheter secured? Not applicable    Current Diet / Nutrition:       Orders Placed This Encounter        Regular Diet Adult                  Manuel Risk Assessment  Sensory Perception: 3-->slightly limited    Moisture: 3-->occasionally moist   Activity: 3-->walks occasionally     Mobility: 3-->slightly limited   Nutrition: 3-->adequate   Manuel Score: 18    Mattress:  Standard , Atmos Air mattress      Labs:   Recent Labs   Lab Test 05/07/20  0454 05/06/20  0928  01/13/17  1020   ALBUMIN  --  3.7   < > 4.2   HGB 10.3* 13.5   < >  --    RBC 3.24* 4.26   < >  --    WBC 28.4* 3.5*   < >  --    PLT 81* 168   < >  --    INR  --  1.13   < >  --    A1C  --   --   --  5.3    < > = values in this interval not displayed.          Skin Assessment (location):   Gluteal fold, groin and perineal  History:  See above    Skin: skin is moist and slightly red, no open ulcerations but pt reports some areas are tender,      Temperature  normal     Drainage:  none    Odor: none    Pain:  Tender           Intervention:     Patient's chart evaluated.      Cares performed:    Orders  Written    Supplies  gathered, placed at the bedside and discussed with RN    Discussed plan of care with Patient and Nurse          Assessment:      Moisture to skin folds, stable, will recommend use of Eh and Critic aid paste for skin protection.         Plan:   Nursing to notify the Provider(s) and re-consult the [facility] Nurse if skin deteriorate(s).    Skin care plan to buttocks and groin: BID and PRN  1. Gently wipe with Eh and apply light layer of Critic aid paste        WO Nurse will return:  sign off

## 2020-05-07 NOTE — PROVIDER NOTIFICATION
Pt has not voided since stent placement. Cath once at 0100. Tries to void frequently, with no result. Per Dr. Byrd try to avoid straight cath.   Urology paged.    Addendum:   Dr. Cornell called. Wait an hour, if pt is unable to void, ok to place indwelling vallecillo and try again tomorrow.

## 2020-05-07 NOTE — CONSULTS
Id CONSULT DICTATED imp 1 89 yo L ureteral stone related urosepsis, ESBL hx but looks likely sens org now    REc ladi, for now then adjust

## 2020-05-07 NOTE — PLAN OF CARE
End of Shift Summary  For vital signs and complete assessments, please see documentation flowsheets.     Pertinent assessments: Disoriented to time, forgetful. Unable to void- straight cath 0100. Weaned to 1L O2. BPs remain soft.  Major Shift Events: Straight cath x1, BC (+) for citrobacter-md aware. Tylenol x1 for back aches. Zofran x1 for nausea. Triggered sepsis protocol, lactic 4.6- receiving 500ml bolus.   Treatment Plan: merrem. Symptom management    Discharge Readiness: Medically active  Expected Discharge Date: unknown  Discharge Disposition: TBD  Barriers/Criteria for discharge: TBD

## 2020-05-07 NOTE — PROVIDER NOTIFICATION
Paged md: [id number] up to 28.4 (3.5 yesterday)-also prn Tylenol was ineffective overnight for abd pain-pt nauseous- can we please try something IV?

## 2020-05-07 NOTE — PROVIDER NOTIFICATION
Pagearchana fleming: 545-IVf order  at midnight, do you want me to restart it since had lower BPs? also bladder scan for 295, will monitor and try again, but currently does not have any orders for prn straight cath?    Received orders for IVF and prn straight cath    Pagearchana fleming 0045: 545- micro called, positive BC, gram neg rods    Pagearchana fleming: 545-micro called, BC growing citrobacter

## 2020-05-07 NOTE — PROGRESS NOTES
Urology Note    Ureteral stent placed 5/6/20  Straight catheterized for 500 ml earlier today. Having difficulty voiding. If this continues, plan for Almanza insertion until clinically improved.  Antibiotics per ID  Dr. Munson to arrange outpatient stone management once infection appropriately treated.    José Cornell MD  Urology  [facility]  Clinic Phone [phone]

## 2020-05-07 NOTE — PLAN OF CARE
[place]: disoriented to situation, confused, staff had to go into pt's room frequently to answer call lights and alarms. Staff able to reorient pt.   VS: /53 (BP Location: Left arm)   Pulse 96   Temp 97.4  F (36.3  C) (Oral)   Resp 28   Wt 59.6 kg (131 lb 8 oz)   SpO2 92%   BMI 25.68 kg/m    LS: crackles, on 1LNC (pt will take on and off), SOB, GALLEGOS  GI: active, last BM today, reports nausea, gave zofran x1 w/relief  : vallecillo in place, pt unable to void all day despite multiple attempts, per Dr. Kraig seth to place vallecillo and assess tomorrow.  Skin: groin red blanchable, cleansed, barrier cream applied  Activity: x1, GB, walker.   Diet: reg   Pain: c/o 7-8/10 back pain. Pt appears to be comfortable and declines intervention. Tylenol given x1 w/relief.   Lab: WBC 28.4. hgb 10.3. K 3.3, replaced, recheck at 2000. Xray KUB complete, please see results.   Plan: Vanco, merrem, ID, WOC, SW. Continue to monitor.

## 2020-05-07 NOTE — CONSULTS
Consult Date:  05/07/2020      INFECTIOUS DISEASE CONSULTATION      REFERRING PHYSICIAN:  Bk Munoz DO      IMPRESSION:   1.  An 89-year-old female with acute sepsis, left ureteral obstruction, gram-negative shon bacteremia with Citrobacter.   2.  Prior history of Clostridium difficile, but not for over a year.      RECOMMENDATIONS:   1.  Agree with meropenem for now, but it looks like probably it will be sensitive organism, simplify antibiotics as able.   2.  Urologic intervention already accomplished with obstruction present, does not have major renal insufficiency, so full dose treatment.   3.  Amount of antibiotics depending on clinical improvement.      HISTORY:  This 89-year-old female seen in consultation due to acute urosepsis.  She has a history of recurrent UTIs and prior C. diff, developed acute abdominal symptoms, nausea and fever.  She has had some hypoxia, among other things had COVID-19 rule out test, but no major infiltrates.  Urine was grossly abnormal and urine culture and blood cultures now growing gram-negative shon.      PAST MEDICAL HISTORY:  Prior obstructive uropathy, currently with the same.  No renal insufficiency, currently or in general, prior history of UTIs including ESBL in the past.      SOCIAL AND FAMILY HISTORY:  Lives in a senior living facility.  No recent exposures.  No active alcohol or tobacco use.      MEDICATIONS:  As listed.      REVIEW OF SYSTEMS:  Feels better.  No other focal symptoms.      PHYSICAL EXAMINATION:   GENERAL:  The patient looks his stated age.   MENTAL STATUS:  Relatively good.   HEART AND LUNGS:  Unremarkable.   ABDOMEN:  Soft, nontender.   EXTREMITIES:  No significant rashes or skin lesions.      LABORATORY DATA:  Urine culture with a gram-negative shon.  Blood cultures with Citrobacter, sensitivities to follow, but no resistance codons noted.      Thank you very much for this consultation.  I will follow patient with you.         MINI CABELLO MD              D: 2020   T: 2020   MT: NTS      Name:     MAGALY MCDANIELS   MRN:      [id number]        Account:       [id number]   :      10/11/1930           Consult Date:  2020      Document: [id number]

## 2020-05-07 NOTE — PROGRESS NOTES
Sepsis Evaluation Progress Note    I was called to see Daya Ignacio due to abnormal vital signs triggering the Sepsis SIRS screening alert. She is known to have an infection.     Physical Exam   Vital Signs:  Temp: 98  F (36.7  C) Temp src: Axillary BP: 96/47 Pulse: 89 Heart Rate: 98 Resp: 24 SpO2: 92 % O2 Device: Nasal cannula Oxygen Delivery: 1 LPM    Lab:  Lactic Acid   Date Value Ref Range Status   05/06/2020 3.6 (H) 0.7 - 2.0 mmol/L Final     Lactate for Sepsis Protocol   Date Value Ref Range Status   05/07/2020 4.6 (HH) 0.7 - 2.0 mmol/L Final     Comment:     Critical Value called to and read back by  Morena Rodriguez ([email or web address]) on 05.07.20 at 0508 by [place]         The patient is at baseline mental status.     The rest of their physical exam is significant for mild tachycardia    Assessment & Plan   NO EVIDENCE OF SEPSIS at this time.  Vital sign, physical exam, and lab findings are likely due to Sepsis.    Disposition: The patient will remain on the current unit. We will continue to monitor this patient closely..    Continue IV meropenem.  500 cc bolus of normal saline now.    Bk Munoz, DO    Sepsis Criteria   Sepsis: 2+ SIRS criteria due to infection  Severe Sepsis: Sepsis AND 1+ new sign of acute organ dysfunction (Note: lactate >2 is organ dysfunction)  Septic Shock: Sepsis AND hypotension despite volume resuscitation with 30 ml/kg crystalloid

## 2020-05-07 NOTE — OP NOTE
Procedure Date: 05/06/2020      SURGEON:  Gaurav Munson MD      PREOPERATIVE DIAGNOSIS:  Left ureteral stone, urinary tract infection.      POSTOPERATIVE DIAGNOSIS:  Left ureteral stone, urinary tract infection.     PROCEDURE PERFORMED:  Cystoscopy, left retrograde pyelogram, interpretation of fluoroscopic images, placement of 6 x 26 double-J left ureteral stent.      ANESTHESIA:  General.      COMPLICATIONS:  None.      INDICATIONS FOR PROCEDURE:  Daya Ignacio is an 89-year-old woman with an obstructing proximal left ureteral stone and urinary tract infection.  She now presents for stent placement.      DETAILS OF THE PROCEDURE:  The risks and benefits of the procedure were explained in detail to patient and informed consent was obtained.  The patient was brought to the operating room and placed supine on the operating table where she underwent general endotracheal anesthetic.  She was then moved down to the dorsal lithotomy position where she was prepped and draped in standard sterile fashion.      The procedure began by introducing the 22-Taiwanese cystoscope through the urethra into the bladder and performing cystoscopy.  The urine was cloudy, but otherwise no abnormalities identified.  I took a  film.  There was contrast throughout the left ureter, even past the area of the stone.  The stone was obscured by contrast.  I cannulated the left reverse ureteral catheter and performed retrograde pyelogram.  This part pyelogram was found to be normal with no dilatations or filling defects present.  I passed a Glidewire into the left kidney and backed off the ureteral catheter.  I then passed 26 double-J ureteral stent over the wire. The wire was pulled back and a good curl was seen in the renal pelvis under fluoroscopy.  A good curl was seen in the bladder under direct visualization.  The bladder was drained and the scope was removed and the procedure was concluded.      The patient tolerated the procedure  without complications.  She went post-anesthetic care in good condition.  She will go to the hospital for further monitoring from there.  I will get a KUB tomorrow to see if the stone is visible to decide whether or not she will have a shock wave lithotripsy or ureteroscopy for followup in the future.         ROZINA PALOMINO MD             D: 2020   T: 2020   MT: DERRICK      Name:     MAGALY MCDANIELS   MRN:      [id number]        Account:        [id number]   :      10/11/1930           Procedure Date: 2020      Document: [id number]

## 2020-05-07 NOTE — CONSULTS
Care Transition Initial Assessment - SW     Met with: Patient and Family  Spoke with Gael   Principal Problem:    Ureteral stone  Active Problems:    UTI (urinary tract infection)    Urinary tract infection without hematuria, site unspecified       DATA  Lives With: alone - [facility]   Living Arrangements: independent living facility  Quality of Family Relationships: helpful, involved  Description of Support System: Supportive, Involved  Who is your support system?: Children  Support Assessment: Adequate family and caregiver support, Adequate social supports. PT has support for meds, meals, bathing, cleaning..   Identified issues/concerns regarding health management: admitted for infection. Pt is not at her baseline mentation    @   Resources List: Assisted Living  [facility]; [phone] fax [phone] [address] [facility] [address]  NO Weekend Returns      Quality of Family Relationships: helpful, involved  Transportation Anticipated: family or friend will provide  Son anticipated W./C transport at this time  Has [facility] for transport coverage  ASSESSMENT  Cognitive Status:  awake, alert and oriented- some confusion   Concerns to be addressed: Met with pt to confirm living situation and family support. PT uses walker at baseline. Enjoys her living situation.   No concerns about plan to return   Will follow for support.      PLAN  SW will follow up with either Iqra or Earnest tomorrow. Neither Rn staff were onsite today    Corinne White [facility]  Inpatient Care Coordination   [phone]  M [facility]

## 2020-05-07 NOTE — PLAN OF CARE
End of Shift Summary  For vital signs and complete assessments, please see documentation flowsheets.     Pertinent assessments: cystoscopy with left ureteral stent insertion, pt arrived to floor around 7:00pm. Lethargic at times. Orientated x3, disorientated to situation. Soft bps's bolus given x1, respirations are shallow, tolerating room air. Mild back pain, denies interventions. Regular diet, had a couple bites of apple sauce. Told pt is incontinent, has not voided since procedure. Bladder scan for 100ml. Instructed to use IS.  Major Shift Events: bolus given for soft bps, held BP medication  Treatment Plan: merrum, fluids running @100. Symptom management    Discharge Readiness: Medically active  Expected Discharge Date: unknown  Discharge Disposition: TBD  Barriers/Criteria for discharge: TBD

## 2020-05-08 DIAGNOSIS — Z11.59 ENCOUNTER FOR SCREENING FOR OTHER VIRAL DISEASES: Primary | ICD-10-CM

## 2020-05-08 DIAGNOSIS — N20.0 KIDNEY STONE: Primary | ICD-10-CM

## 2020-05-08 LAB
ANION GAP SERPL CALCULATED.3IONS-SCNC: 4 MMOL/L (ref 3–14)
ANISOCYTOSIS BLD QL SMEAR: SLIGHT
BASOPHILS # BLD AUTO: 0 10E9/L (ref 0–0.2)
BASOPHILS NFR BLD AUTO: 0 %
BUN SERPL-MCNC: 25 MG/DL (ref 7–30)
BURR CELLS BLD QL SMEAR: SLIGHT
CALCIUM SERPL-MCNC: 8.6 MG/DL (ref 8.5–10.1)
CHLORIDE SERPL-SCNC: 113 MMOL/L (ref 94–109)
CO2 SERPL-SCNC: 24 MMOL/L (ref 20–32)
CREAT SERPL-MCNC: 0.78 MG/DL (ref 0.52–1.04)
CRP SERPL-MCNC: 281 MG/L (ref 0–8)
DIFFERENTIAL METHOD BLD: ABNORMAL
EOSINOPHIL # BLD AUTO: 0 10E9/L (ref 0–0.7)
EOSINOPHIL NFR BLD AUTO: 0 %
ERYTHROCYTE [DISTWIDTH] IN BLOOD BY AUTOMATED COUNT: 15.2 % (ref 10–15)
GFR SERPL CREATININE-BSD FRML MDRD: 67 ML/MIN/{1.73_M2}
GLUCOSE SERPL-MCNC: 63 MG/DL (ref 70–99)
HCT VFR BLD AUTO: 33.6 % (ref 35–47)
HGB BLD-MCNC: 10.9 G/DL (ref 11.7–15.7)
LYMPHOCYTES # BLD AUTO: 0.6 10E9/L (ref 0.8–5.3)
LYMPHOCYTES NFR BLD AUTO: 2 %
MCH RBC QN AUTO: 32.1 PG (ref 26.5–33)
MCHC RBC AUTO-ENTMCNC: 32.4 G/DL (ref 31.5–36.5)
MCV RBC AUTO: 99 FL (ref 78–100)
METAMYELOCYTES # BLD: 0.6 10E9/L
METAMYELOCYTES NFR BLD MANUAL: 2 %
MONOCYTES # BLD AUTO: 0.8 10E9/L (ref 0–1.3)
MONOCYTES NFR BLD AUTO: 3 %
NEUTROPHILS # BLD AUTO: 26.3 10E9/L (ref 1.6–8.3)
NEUTROPHILS NFR BLD AUTO: 93 %
PLATELET # BLD AUTO: 72 10E9/L (ref 150–450)
PLATELET # BLD EST: ABNORMAL 10*3/UL
POTASSIUM SERPL-SCNC: 3.9 MMOL/L (ref 3.4–5.3)
RBC # BLD AUTO: 3.4 10E12/L (ref 3.8–5.2)
SODIUM SERPL-SCNC: 141 MMOL/L (ref 133–144)
WBC # BLD AUTO: 28.3 10E9/L (ref 4–11)

## 2020-05-08 PROCEDURE — 12000000 ZZH R&B MED SURG/OB

## 2020-05-08 PROCEDURE — 25000132 ZZH RX MED GY IP 250 OP 250 PS 637: Performed by: INTERNAL MEDICINE

## 2020-05-08 PROCEDURE — 36415 COLL VENOUS BLD VENIPUNCTURE: CPT | Performed by: HOSPITALIST

## 2020-05-08 PROCEDURE — 80048 BASIC METABOLIC PNL TOTAL CA: CPT | Performed by: INTERNAL MEDICINE

## 2020-05-08 PROCEDURE — 25000132 ZZH RX MED GY IP 250 OP 250 PS 637: Performed by: UROLOGY

## 2020-05-08 PROCEDURE — 99233 SBSQ HOSP IP/OBS HIGH 50: CPT | Performed by: HOSPITALIST

## 2020-05-08 PROCEDURE — 85025 COMPLETE CBC W/AUTO DIFF WBC: CPT | Performed by: INTERNAL MEDICINE

## 2020-05-08 PROCEDURE — 25000128 H RX IP 250 OP 636: Performed by: UROLOGY

## 2020-05-08 PROCEDURE — 25000132 ZZH RX MED GY IP 250 OP 250 PS 637: Performed by: HOSPITALIST

## 2020-05-08 PROCEDURE — 87040 BLOOD CULTURE FOR BACTERIA: CPT | Performed by: HOSPITALIST

## 2020-05-08 PROCEDURE — 36415 COLL VENOUS BLD VENIPUNCTURE: CPT | Performed by: INTERNAL MEDICINE

## 2020-05-08 PROCEDURE — 99231 SBSQ HOSP IP/OBS SF/LOW 25: CPT | Performed by: UROLOGY

## 2020-05-08 PROCEDURE — 86140 C-REACTIVE PROTEIN: CPT | Performed by: INTERNAL MEDICINE

## 2020-05-08 RX ORDER — CEFAZOLIN SODIUM 1 G
1 VIAL (EA) INJECTION SEE ADMIN INSTRUCTIONS
Status: CANCELLED | OUTPATIENT
Start: 2020-05-08

## 2020-05-08 RX ORDER — SACCHAROMYCES BOULARDII 250 MG
250 CAPSULE ORAL 2 TIMES DAILY
Status: DISCONTINUED | OUTPATIENT
Start: 2020-05-08 | End: 2020-05-12 | Stop reason: HOSPADM

## 2020-05-08 RX ADMIN — Medication 250 MG: at 20:45

## 2020-05-08 RX ADMIN — SERTRALINE HYDROCHLORIDE 100 MG: 100 TABLET ORAL at 08:24

## 2020-05-08 RX ADMIN — ACETAMINOPHEN 650 MG: 325 TABLET, FILM COATED ORAL at 23:06

## 2020-05-08 RX ADMIN — Medication 250 MG: at 12:32

## 2020-05-08 RX ADMIN — MEROPENEM 1 G: 1 INJECTION, POWDER, FOR SOLUTION INTRAVENOUS at 12:32

## 2020-05-08 RX ADMIN — MEROPENEM 1 G: 1 INJECTION, POWDER, FOR SOLUTION INTRAVENOUS at 03:58

## 2020-05-08 RX ADMIN — OMEPRAZOLE 20 MG: 20 CAPSULE, DELAYED RELEASE ORAL at 08:24

## 2020-05-08 RX ADMIN — MEROPENEM 1 G: 1 INJECTION, POWDER, FOR SOLUTION INTRAVENOUS at 20:45

## 2020-05-08 RX ADMIN — ACETAMINOPHEN 650 MG: 325 TABLET, FILM COATED ORAL at 12:34

## 2020-05-08 RX ADMIN — VANCOMYCIN HYDROCHLORIDE 125 MG: 125 CAPSULE ORAL at 20:45

## 2020-05-08 RX ADMIN — VANCOMYCIN HYDROCHLORIDE 125 MG: 125 CAPSULE ORAL at 08:24

## 2020-05-08 ASSESSMENT — ACTIVITIES OF DAILY LIVING (ADL)
ADLS_ACUITY_SCORE: 19

## 2020-05-08 NOTE — PLAN OF CARE
To Do:  End of Shift Summary  For vital signs and complete assessments, please see documentation flowsheets.     Pertinent assessments: A/O x3 but forgetful. O2 at 2L. Almanza patent, no hematuria.   Major Shift Events None  Treatment Plan: IV antibiotic    Discharge Readiness: Medically active  Expected Discharge Date: 2-3 days  Discharge Disposition: Assisted living Facility  Barriers/Criteria for discharge IV antibiotic and O2

## 2020-05-08 NOTE — PROGRESS NOTES
[facility]  Infectious Disease Progress Note          Assessment and Plan:   IMPRESSION:   1.  An 89-year-old female with acute sepsis, left ureteral obstruction, gram-negative shon bacteremia with Citrobacter.   2.  Prior history of Clostridium difficile, but not for over a year.  3 sulfa allergy,       RECOMMENDATIONS:   1.  Continue meropenem , unusual R pattern but luckily po options    2.  Urologic intervention already accomplished with obstruction present, does not have major renal insufficiency, so full dose treatment.   3.  Improved, sulfa allergy remote but probably real po levaquin logical home plan, to po at disposition to complete 2 weeks total po             Interval History:   no new complaints and doing well; no cp, sob, n/v/d, or abd pain. T down cx as above               Medications:       meropenem  1 g Intravenous Q8H     omeprazole  20 mg Oral Daily     saccharomyces boulardii  250 mg Oral BID     sertraline  100 mg Oral Daily     sodium chloride (PF)  3 mL Intracatheter Q8H     sodium chloride (PF)  3 mL Intracatheter Q8H     vancomycin  125 mg Oral BID                  Physical Exam:   Blood pressure (!) 164/82, pulse 96, temperature 96.9  F (36.1  C), temperature source Oral, resp. rate 20, weight 59.6 kg (131 lb 8 oz), SpO2 94 %, not currently breastfeeding.  Wt Readings from Last 2 Encounters:   05/07/20 59.6 kg (131 lb 8 oz)   11/20/19 56.5 kg (124 lb 9.6 oz)     Vital Signs with Ranges  Temp:  [95.6  F (35.3  C)-98.4  F (36.9  C)] 96.9  F (36.1  C)  Pulse:  [96] 96  Heart Rate:  [] 96  Resp:  [20-28] 20  BP: (122-164)/(50-82) 164/82  SpO2:  [92 %-97 %] 94 %    Constitutional: Awake, alert, cooperative, no apparent distress   Lungs: Clear to auscultation bilaterally, no crackles or wheezing   Cardiovascular: Regular rate and rhythm, normal S1 and S2, and no murmur noted   Abdomen: Normal bowel sounds, soft, non-distended, non-tender   Skin: No rashes, no  cyanosis, no edema   Other:           Data:   All microbiology laboratory data reviewed.  Recent Labs   Lab Test 05/08/20  0621 05/07/20  0454 05/06/20  0928   WBC 28.3* 28.4* 3.5*   HGB 10.9* 10.3* 13.5   HCT 33.6* 32.2* 42.3   MCV 99 99 99   PLT 72* 81* 168     Recent Labs   Lab Test 05/08/20  0621 05/07/20  0454 05/06/20  0928   CR 0.78 0.93 0.94     No lab results found.  Recent Labs   Lab Test 05/06/20  1231 05/06/20  1035 05/06/20  0948 08/25/19  1421 08/25/19  1409 08/25/19  1359 08/21/19  2221 08/21/19  1953 07/28/19  0841   CULT Cultured on the 1st day of incubation:  Citrobacter freundii complex  Susceptibility testing done on previous specimen  *  Critical Value/Significant Value, preliminary result only, called to and read back by  Paty Maddox RN at 0439 on 5.7.20 by SS.   >100,000 colonies/mL  Citrobacter freundii complex  Susceptibility testing in progress  *  Culture in progress Cultured on the 1st day of incubation:  Citrobacter freundii complex  *  Critical Value/Significant Value, preliminary result only, called to and read back by  Roxanna Small RN at 0045 on 5.7.20 by SS.    (Note)  POSITIVE for CITROBACTER SPECIES by Verigene multiplex nucleic acid  test. Final identification and antimicrobial susceptibility testing  will be verified by standard methods.    Specimen tested with Verigene multiplex, gram-negative blood culture  nucleic acid test for the following targets: Acinetobacter sp.,  Citrobacter sp., Enterobacter sp., Proteus sp., E. coli, K.  pneumoniae/oxytoca, P. aeruginosa, and the following resistance  markers: CTXM, KPC, NDM, VIM, IMP and OXA.    Critical Value/Significant Value called to and read back by Delmy Yañez RN in RMS5 at 0320 5.7.20. AMD   No growth No growth No growth No growth  No growth No growth No growth

## 2020-05-08 NOTE — PROGRESS NOTES
[facility]    Hospitalist Progress Note    Date of Service (when I saw the patient): 05/08/2020  Provider:  Jhoan Baltazar MD   Text Page  7am - 6PM       Assessment & Plan   Summary of Stay: Daya Ignacio is a 89 year old female with known prior history of ESBL E. coli UTI, hypertension, dyslipidemia, admitted on 5/6/2020 with worsening generalized weakness, accompanied with nausea and vomiting.     Problem List:   1. Citrobacter UTI (obstructive)/bacteriemia/sepsis. Severe sepsis secondary to underlying UTI with bacteremia  2. Left ureteral stone, status post ureteral stent placement last 5/6/2020  3. History of C. difficile colitis back in June 2019  4. History of hypertension  5. Physical deconditioning  6. Thrombocytopenia sepsis induced     Inpatient care as she remain high risk for clinical deterioration.  IV systemic antibiotics of meropenem. H/O ESBL E. Coli.  Waiting for sensitivities.   ID service input appreciated  Urology following with us.  Highly appreciate prompt input earlier and tolerated ureteral stent placement.  Plans for shockwave lithotripsy or ureteroscopy for stone extraction as outpatient after finishing antibiotic course  As needed APAP.    Antiemetics as needed.  Vancomycin po  fro prophylaxis.  Florastor added.     DVT Prophylaxis: Pneumatic Compression Devices  Code Status: DNR/DNI  Disposition: Expected discharge in 1 - 2 days once BC negative.    Interval History   She is feeling better, report of intermittent diarrhea.  Denies fever, chest pain or shortness of breath.  Not eating much because does not like the food.  Forgetful and confused at times.    -Data reviewed today: I reviewed all new labs and imaging results over the last 24 hours.     Physical Exam   Temp: 96.9  F (36.1  C) Temp src: Oral BP: (Abnormal) 164/82 Pulse: 96 Heart Rate: 96 Resp: 20 SpO2: 97 % O2 Device: Nasal cannula Oxygen Delivery: 2 LPM  Vitals:    05/07/20 1112   Weight: 59.6 kg (131 lb 8  oz)     Vital Signs with Ranges  Temp:  [95.6  F (35.3  C)-98.4  F (36.9  C)] 96.9  F (36.1  C)  Pulse:  [96] 96  Heart Rate:  [] 96  Resp:  [20-28] 20  BP: (122-164)/(50-82) 164/82  SpO2:  [92 %-97 %] 97 %  I/O last 3 completed shifts:  In: 252 [P.O.:240; I.V.:12]  Out: 2215 [Urine:2215]    GEN:  Alert, cooperative with memory issues, appears comfortable, NAD.  HEENT:  Normocephalic/atraumatic, no scleral icterus, no nasal discharge, mouth moist.  CV:  Regular rate and rhythm, no murmur or JVD.  S1 + S2 noted, no S3 or S4.  LUNGS:  Clear to auscultation bilaterally without rales/rhonchi/wheezing/retractions.  Symmetric chest rise on inhalation noted.  ABD:  Active bowel sounds, soft, non-tender/non-distended.  No rebound/guarding/rigidity.  EXT:  No edema or cyanosis.  No joint synovitis noted.  SKIN:  Dry to touch, no exanthems noted in the visualized areas.       Medications       meropenem  1 g Intravenous Q8H     omeprazole  20 mg Oral Daily     sertraline  100 mg Oral Daily     sodium chloride (PF)  3 mL Intracatheter Q8H     sodium chloride (PF)  3 mL Intracatheter Q8H     vancomycin  125 mg Oral BID       Data   Recent Labs   Lab 05/08/20  0621 05/07/20 2017 05/07/20  0454 05/06/20  0928   WBC 28.3*  --  28.4* 3.5*   HGB 10.9*  --  10.3* 13.5   MCV 99  --  99 99   PLT 72*  --  81* 168   INR  --   --   --  1.13     --  139 139   POTASSIUM 3.9 5.0 3.3* 3.8   CHLORIDE 113*  --  109 105   CO2 24  --  18* 25   BUN 25  --  28 24   CR 0.78  --  0.93 0.94   ANIONGAP 4  --  12 9   DAVE 8.6  --  7.9* 9.9   GLC 63*  --  85 99   ALBUMIN  --   --   --  3.7   PROTTOTAL  --   --   --  7.3   BILITOTAL  --   --   --  1.3   ALKPHOS  --   --   --  113   ALT  --   --   --  12   AST  --   --   --  15   LIPASE  --   --   --  42*       No results found for this or any previous visit (from the past 24 hour(s)).          Disclaimer: This note consists of symbols derived from keyboarding, dictation and/or voice  recognition software. As a result, there may be errors in the script that have gone undetected. Please consider this when interpreting information found in this chart.

## 2020-05-08 NOTE — PROGRESS NOTES
Urology    Feeling better  Afebrile    WBC still 28.3  Creatinine 0.78  KUB shows stone adjacent to proximal stent, stent in good position in upper pole  Urin cx growing citrobacter    A/P: Await urine cultures  Will ultimately discharge on 2 weeks antibiotics and, for follow up procedure, I would recommend ESWL of her left ureteral stone with stent still in place. Procedure discussed. We will get her scheduled

## 2020-05-08 NOTE — PROGRESS NOTES
Discharge Planner   Discharge Plans in progress: Called longterm and spoke Shannon  They are unable to accept pt back on the weekends. Ok to fill any new meds here. SW will fax COVID test to them today for review    Barriers to discharge plan: if Medically ready over the weekend will be a delay     Follow up plan:      05/07/20 1300   Final Resources   Assisted Living   ([facility] [phone] Fax [phone])   Per conversation with Gael yesterday ok to sent vie w/c transport.      [facility] [place]  Inpatient Care Coordination   [phone]  [facility]        Entered by: Corinne C. White 05/08/2020 9:51 AM

## 2020-05-08 NOTE — PLAN OF CARE
End of Shift Summary  For vital signs and complete assessments, please see documentation flowsheets.     Pertinent assessments: A/O x3 but forgetful. O2 at 2L. Almanza patent, no hematuria.   Major Shift Events: None  Treatment Plan: IV antibiotic (merrem, vanco)

## 2020-05-09 LAB
BACTERIA SPEC CULT: ABNORMAL
BASOPHILS # BLD AUTO: 0.1 10E9/L (ref 0–0.2)
BASOPHILS NFR BLD AUTO: 0.3 %
CRP SERPL-MCNC: 182 MG/L (ref 0–8)
DIFFERENTIAL METHOD BLD: ABNORMAL
EOSINOPHIL # BLD AUTO: 0.3 10E9/L (ref 0–0.7)
EOSINOPHIL NFR BLD AUTO: 1.1 %
ERYTHROCYTE [DISTWIDTH] IN BLOOD BY AUTOMATED COUNT: 14.9 % (ref 10–15)
HCT VFR BLD AUTO: 36.1 % (ref 35–47)
HGB BLD-MCNC: 11.6 G/DL (ref 11.7–15.7)
IMM GRANULOCYTES # BLD: 0.3 10E9/L (ref 0–0.4)
IMM GRANULOCYTES NFR BLD: 1 %
LYMPHOCYTES # BLD AUTO: 1.2 10E9/L (ref 0.8–5.3)
LYMPHOCYTES NFR BLD AUTO: 4.6 %
MCH RBC QN AUTO: 31.6 PG (ref 26.5–33)
MCHC RBC AUTO-ENTMCNC: 32.1 G/DL (ref 31.5–36.5)
MCV RBC AUTO: 98 FL (ref 78–100)
MONOCYTES # BLD AUTO: 0.8 10E9/L (ref 0–1.3)
MONOCYTES NFR BLD AUTO: 2.8 %
NEUTROPHILS # BLD AUTO: 24 10E9/L (ref 1.6–8.3)
NEUTROPHILS NFR BLD AUTO: 90.2 %
NRBC # BLD AUTO: 0 10*3/UL
NRBC BLD AUTO-RTO: 0 /100
PLATELET # BLD AUTO: 69 10E9/L (ref 150–450)
PROCALCITONIN SERPL-MCNC: 26.25 NG/ML
RBC # BLD AUTO: 3.67 10E12/L (ref 3.8–5.2)
SPECIMEN SOURCE: ABNORMAL
SPECIMEN SOURCE: ABNORMAL
WBC # BLD AUTO: 26.6 10E9/L (ref 4–11)

## 2020-05-09 PROCEDURE — 25000132 ZZH RX MED GY IP 250 OP 250 PS 637: Performed by: INTERNAL MEDICINE

## 2020-05-09 PROCEDURE — 25000132 ZZH RX MED GY IP 250 OP 250 PS 637: Performed by: UROLOGY

## 2020-05-09 PROCEDURE — 36415 COLL VENOUS BLD VENIPUNCTURE: CPT | Performed by: HOSPITALIST

## 2020-05-09 PROCEDURE — 25000128 H RX IP 250 OP 636: Performed by: UROLOGY

## 2020-05-09 PROCEDURE — 85025 COMPLETE CBC W/AUTO DIFF WBC: CPT | Performed by: HOSPITALIST

## 2020-05-09 PROCEDURE — 99233 SBSQ HOSP IP/OBS HIGH 50: CPT | Performed by: HOSPITALIST

## 2020-05-09 PROCEDURE — 25000132 ZZH RX MED GY IP 250 OP 250 PS 637: Performed by: HOSPITALIST

## 2020-05-09 PROCEDURE — 12000000 ZZH R&B MED SURG/OB

## 2020-05-09 PROCEDURE — 84145 PROCALCITONIN (PCT): CPT | Performed by: HOSPITALIST

## 2020-05-09 PROCEDURE — 87040 BLOOD CULTURE FOR BACTERIA: CPT | Performed by: HOSPITALIST

## 2020-05-09 PROCEDURE — 86140 C-REACTIVE PROTEIN: CPT | Performed by: HOSPITALIST

## 2020-05-09 RX ORDER — AMLODIPINE BESYLATE 10 MG/1
10 TABLET ORAL DAILY
Status: DISCONTINUED | OUTPATIENT
Start: 2020-05-09 | End: 2020-05-12 | Stop reason: HOSPADM

## 2020-05-09 RX ORDER — CARVEDILOL 12.5 MG/1
12.5 TABLET ORAL 2 TIMES DAILY WITH MEALS
Status: DISCONTINUED | OUTPATIENT
Start: 2020-05-09 | End: 2020-05-11

## 2020-05-09 RX ORDER — LISINOPRIL 10 MG/1
10 TABLET ORAL DAILY
Status: DISCONTINUED | OUTPATIENT
Start: 2020-05-09 | End: 2020-05-11

## 2020-05-09 RX ADMIN — MEROPENEM 1 G: 1 INJECTION, POWDER, FOR SOLUTION INTRAVENOUS at 12:15

## 2020-05-09 RX ADMIN — VANCOMYCIN HYDROCHLORIDE 125 MG: 125 CAPSULE ORAL at 20:02

## 2020-05-09 RX ADMIN — ONDANSETRON 4 MG: 4 TABLET, ORALLY DISINTEGRATING ORAL at 08:50

## 2020-05-09 RX ADMIN — Medication 250 MG: at 08:12

## 2020-05-09 RX ADMIN — CARVEDILOL 12.5 MG: 12.5 TABLET, FILM COATED ORAL at 18:51

## 2020-05-09 RX ADMIN — OMEPRAZOLE 20 MG: 20 CAPSULE, DELAYED RELEASE ORAL at 08:12

## 2020-05-09 RX ADMIN — SERTRALINE HYDROCHLORIDE 100 MG: 100 TABLET ORAL at 08:12

## 2020-05-09 RX ADMIN — MEROPENEM 1 G: 1 INJECTION, POWDER, FOR SOLUTION INTRAVENOUS at 04:33

## 2020-05-09 RX ADMIN — ACETAMINOPHEN 650 MG: 325 TABLET, FILM COATED ORAL at 08:12

## 2020-05-09 RX ADMIN — LISINOPRIL 10 MG: 10 TABLET ORAL at 10:01

## 2020-05-09 RX ADMIN — CARVEDILOL 12.5 MG: 12.5 TABLET, FILM COATED ORAL at 08:50

## 2020-05-09 RX ADMIN — MEROPENEM 1 G: 1 INJECTION, POWDER, FOR SOLUTION INTRAVENOUS at 19:59

## 2020-05-09 RX ADMIN — AMLODIPINE BESYLATE 10 MG: 10 TABLET ORAL at 08:50

## 2020-05-09 RX ADMIN — VANCOMYCIN HYDROCHLORIDE 125 MG: 125 CAPSULE ORAL at 08:12

## 2020-05-09 RX ADMIN — Medication 250 MG: at 20:02

## 2020-05-09 ASSESSMENT — ACTIVITIES OF DAILY LIVING (ADL)
ADLS_ACUITY_SCORE: 19

## 2020-05-09 NOTE — PROGRESS NOTES
[facility]    Hospitalist Progress Note    Date of Service (when I saw the patient): 05/09/2020  Provider:  Jhoan Baltazar MD   Text Page  7am - 6PM       Assessment & Plan   Summary of Stay: Daya Ignacio is a 89 year old female with known prior history of ESBL E. coli UTI, hypertension, dyslipidemia, admitted on 5/6/2020 with worsening generalized weakness, accompanied with nausea and vomiting.     Problem List:   1. Citrobacter UTI (obstructive)/bacteriemia/sepsis. Severe sepsis secondary to underlying UTI with bacteremia. She admitted with leukopenia but in the next 24 hours and for all the stay has developed leukocytosis with neutrophilia, and moderate anemia/thrombocytopenia. Afebrile but high CRP that is now in downward trend. Last BC + on 5/6.   2. Left ureteral stone, status post ureteral stent placement last 5/6/2020  3. History of C. difficile colitis back in June 2019  4. History of hypertension. Elevated, not symptomatic. PTA med on hold. I am resuming Norvasc, Carvedilol and Lisinopril at lower dose and will titrate as needed  5. Physical deconditioning  6. Thrombocytopenia sepsis induced     Plan  Inpatient care(high risk for clinical deterioration).  Meropenem IV. H/O ESBL E. Coli.  ID service input appreciated. Sensitivities available. Plan for ten days of oral Levofloxacin after discharge.   Urology following with us.  Highly appreciate input and procedure with ureteral stent placement.  Plans for shockwave lithotripsy or ureteroscopy for stone extraction as outpatient after finishing antibiotic course  As needed APAP.    Antiemetics as needed.  Vancomycin po for prophylaxis.  Florastor added.     DVT Prophylaxis: Pneumatic Compression Devices  Code Status: DNR/DNI  Disposition: Expected discharge on Monday once BC negative x 3 consecutive days, clinically stable.    Interval History   She is feeling nausea after oral morning meds, denies diarrhea.  Denies fever, chest pain or  shortness of breath.  Not having breakfast b/c of nausea.  Forgetful and confused at times.    -Data reviewed today: I reviewed all new labs and imaging results over the last 24 hours.     Physical Exam   Temp: 96.9  F (36.1  C) Temp src: Oral BP: (Abnormal) 179/76 Pulse: 98 Heart Rate: 94 Resp: 20 SpO2: 94 % O2 Device: None (Room air)    Vitals:    05/07/20 1112   Weight: 59.6 kg (131 lb 8 oz)     Vital Signs with Ranges  Temp:  [96.9  F (36.1  C)-98.4  F (36.9  C)] 96.9  F (36.1  C)  Pulse:  [98] 98  Heart Rate:  [94-96] 94  Resp:  [18-20] 20  BP: (155-179)/(67-82) 179/76  SpO2:  [92 %-94 %] 94 %  I/O last 3 completed shifts:  In: 900 [P.O.:900]  Out: 2050 [Urine:2050]    GEN:  Alert, cooperative with memory issues, appears comfortable, NAD.  HEENT:  Normocephalic/atraumatic, no scleral icterus, no nasal discharge, mouth moist.  CV:  Regular rate and rhythm, no murmur or JVD.  S1 + S2 noted, no S3 or S4.  LUNGS:  Clear to auscultation bilaterally without rales/rhonchi/wheezing/retractions.  Symmetric chest rise on inhalation noted.  ABD:  Active bowel sounds, soft, non-tender/non-distended.  No rebound/guarding/rigidity.  EXT:  No edema or cyanosis.  No joint synovitis noted.  SKIN:  Dry to touch, no exanthems noted in the visualized areas.       Medications       amLODIPine  10 mg Oral Daily     carvedilol  12.5 mg Oral BID w/meals     lisinopril  10 mg Oral Daily     meropenem  1 g Intravenous Q8H     omeprazole  20 mg Oral Daily     saccharomyces boulardii  250 mg Oral BID     sertraline  100 mg Oral Daily     sodium chloride (PF)  3 mL Intracatheter Q8H     sodium chloride (PF)  3 mL Intracatheter Q8H     vancomycin  125 mg Oral BID       Data   Recent Labs   Lab 05/09/20  0618 05/08/20  0621 05/07/20 2017 05/07/20  0454 05/06/20  0928   WBC 26.6* 28.3*  --  28.4* 3.5*   HGB 11.6* 10.9*  --  10.3* 13.5   MCV 98 99  --  99 99   PLT 69* 72*  --  81* 168   INR  --   --   --   --  1.13   NA  --  141  --  139 139    POTASSIUM  --  3.9 5.0 3.3* 3.8   CHLORIDE  --  113*  --  109 105   CO2  --  24  --  18* 25   BUN  --  25  --  28 24   CR  --  0.78  --  0.93 0.94   ANIONGAP  --  4  --  12 9   DAVE  --  8.6  --  7.9* 9.9   GLC  --  63*  --  85 99   ALBUMIN  --   --   --   --  3.7   PROTTOTAL  --   --   --   --  7.3   BILITOTAL  --   --   --   --  1.3   ALKPHOS  --   --   --   --  113   ALT  --   --   --   --  12   AST  --   --   --   --  15   LIPASE  --   --   --   --  42*       No results found for this or any previous visit (from the past 24 hour(s)).          Disclaimer: This note consists of symbols derived from keyboarding, dictation and/or voice recognition software. As a result, there may be errors in the script that have gone undetected. Please consider this when interpreting information found in this chart.

## 2020-05-09 NOTE — PROGRESS NOTES
[facility]  Infectious Disease Progress Note          Assessment and Plan:   IMPRESSION:   1.  An 89-year-old female with acute sepsis, left ureteral obstruction, gram-negative shon bacteremia with Citrobacter.   2.  Prior history of Clostridium difficile, but not for over a year.  3 sulfa allergy,       RECOMMENDATIONS:   1.  Continue meropenem , unusual R pattern but luckily po options    2.  Urologic intervention already accomplished with obstruction present, does not have major renal insufficiency, so full dose treatment.   3.  Improved progressively, sulfa allergy remote but probably real,  po levaquin logical home plan, to po at disposition to complete 2 weeks total po, ie about 10 days more             Interval History:   no new complaints and doing well; no cp, sob, n/v/d, or abd pain. T down cx as above               Medications:       amLODIPine  10 mg Oral Daily     carvedilol  12.5 mg Oral BID w/meals     lisinopril  10 mg Oral Daily     meropenem  1 g Intravenous Q8H     omeprazole  20 mg Oral Daily     saccharomyces boulardii  250 mg Oral BID     sertraline  100 mg Oral Daily     sodium chloride (PF)  3 mL Intracatheter Q8H     sodium chloride (PF)  3 mL Intracatheter Q8H     vancomycin  125 mg Oral BID                  Physical Exam:   Blood pressure (!) 147/72, pulse 98, temperature 96.9  F (36.1  C), temperature source Oral, resp. rate 20, weight 59.6 kg (131 lb 8 oz), SpO2 94 %, not currently breastfeeding.  Wt Readings from Last 2 Encounters:   05/07/20 59.6 kg (131 lb 8 oz)   11/20/19 56.5 kg (124 lb 9.6 oz)     Vital Signs with Ranges  Temp:  [96.9  F (36.1  C)-98.4  F (36.9  C)] 96.9  F (36.1  C)  Pulse:  [98] 98  Heart Rate:  [94-96] 94  Resp:  [18-20] 20  BP: (147-179)/(67-82) 147/72  SpO2:  [92 %-94 %] 94 %    Constitutional: Awake, alert, cooperative, no apparent distress   Lungs: Clear to auscultation bilaterally, no crackles or wheezing   Cardiovascular: Regular  rate and rhythm, normal S1 and S2, and no murmur noted   Abdomen: Normal bowel sounds, soft, non-distended, non-tender   Skin: No rashes, no cyanosis, no edema   Other:           Data:   All microbiology laboratory data reviewed.  Recent Labs   Lab Test 05/09/20  0618 05/08/20  0621 05/07/20  0454   WBC 26.6* 28.3* 28.4*   HGB 11.6* 10.9* 10.3*   HCT 36.1 33.6* 32.2*   MCV 98 99 99   PLT 69* 72* 81*     Recent Labs   Lab Test 05/08/20  0621 05/07/20  0454 05/06/20  0928   CR 0.78 0.93 0.94     No lab results found.  Recent Labs   Lab Test 05/09/20  0944 05/09/20  0920 05/08/20  1538 05/08/20  1533 05/06/20  1231 05/06/20  1035 05/06/20  0948 08/25/19  1421 08/25/19  1409   CULT PENDING PENDING No growth after 11 hours No growth after 11 hours Cultured on the 1st day of incubation:  Citrobacter freundii complex  Susceptibility testing done on previous specimen  *  Critical Value/Significant Value, preliminary result only, called to and read back by  Paty Maddox RN at 0439 on 5.7.20 by SS.   >100,000 colonies/mL  Citrobacter freundii complex  *  10,000 to 50,000 colonies/mL  Proteus mirabilis  *  Culture in progress Cultured on the 1st day of incubation:  Citrobacter freundii complex  *  Critical Value/Significant Value, preliminary result only, called to and read back by  Roxanna Small RN at 0045 on 5.7.20 by SS.    (Note)  POSITIVE for CITROBACTER SPECIES by Verigene multiplex nucleic acid  test. Final identification and antimicrobial susceptibility testing  will be verified by standard methods.    Specimen tested with Verigene multiplex, gram-negative blood culture  nucleic acid test for the following targets: Acinetobacter sp.,  Citrobacter sp., Enterobacter sp., Proteus sp., E. coli, K.  pneumoniae/oxytoca, P. aeruginosa, and the following resistance  markers: CTXM, KPC, NDM, VIM, IMP and OXA.    Critical Value/Significant Value called to and read back by Delmy Yañez RN in RMS5 at 0320 5.7.20.  AMD   No growth No growth

## 2020-05-09 NOTE — PLAN OF CARE
To Do:  End of Shift Summary  For vital signs and complete assessments, please see documentation flowsheets.     Pertinent assessments: A/O x3 but forgetful. VSS on room air. Almanza, no hematuria.   Major Shift Events: New blood cultures drawn.  Treatment Plan: IV Merrem, PO Vanco, probiotic.    Discharge Readiness: Medically active  Expected Discharge Date: 5/11  Discharge Disposition: Assisted living Facility  Barriers/Criteria for discharge: Abx

## 2020-05-09 NOTE — PLAN OF CARE
End of Shift Summary  For vital signs and complete assessments, please see documentation flowsheets.     Pertinent assessments: A/O x3 but forgetful. /76; recheck 147/47. on room air. Almanza patent, no hematuria.   Major Shift Events: New blood cultures drawn. Gave Zofran for nausea.  Treatment Plan: IV Merrem, PO Vanco, probiotic.

## 2020-05-09 NOTE — PLAN OF CARE
Pertinent assessments: A/O x3 but forgetful. VSS on room air. Almanza, no hematuria.   Major Shift Events: New blood cultures drawn; no growth thus far.  Treatment Plan: IV Merrem, PO Vanco, probiotic.    Discharge Readiness: Medically active  Expected Discharge Date: 5/11  Discharge Disposition: Assisted living Facility  Barriers/Criteria for discharge: Abx

## 2020-05-10 LAB
ANION GAP SERPL CALCULATED.3IONS-SCNC: 4 MMOL/L (ref 3–14)
BASOPHILS # BLD AUTO: 0 10E9/L (ref 0–0.2)
BASOPHILS NFR BLD AUTO: 0.4 %
BUN SERPL-MCNC: 16 MG/DL (ref 7–30)
CALCIUM SERPL-MCNC: 8.8 MG/DL (ref 8.5–10.1)
CHLORIDE SERPL-SCNC: 106 MMOL/L (ref 94–109)
CO2 SERPL-SCNC: 25 MMOL/L (ref 20–32)
CREAT SERPL-MCNC: 0.54 MG/DL (ref 0.52–1.04)
CRP SERPL-MCNC: 67.2 MG/L (ref 0–8)
DIFFERENTIAL METHOD BLD: ABNORMAL
EOSINOPHIL # BLD AUTO: 0.1 10E9/L (ref 0–0.7)
EOSINOPHIL NFR BLD AUTO: 1.4 %
ERYTHROCYTE [DISTWIDTH] IN BLOOD BY AUTOMATED COUNT: 14.5 % (ref 10–15)
GFR SERPL CREATININE-BSD FRML MDRD: 83 ML/MIN/{1.73_M2}
GLUCOSE SERPL-MCNC: 85 MG/DL (ref 70–99)
HCT VFR BLD AUTO: 35.6 % (ref 35–47)
HGB BLD-MCNC: 11.6 G/DL (ref 11.7–15.7)
IMM GRANULOCYTES # BLD: 0.1 10E9/L (ref 0–0.4)
IMM GRANULOCYTES NFR BLD: 0.7 %
LYMPHOCYTES # BLD AUTO: 1 10E9/L (ref 0.8–5.3)
LYMPHOCYTES NFR BLD AUTO: 9.9 %
MCH RBC QN AUTO: 31.6 PG (ref 26.5–33)
MCHC RBC AUTO-ENTMCNC: 32.6 G/DL (ref 31.5–36.5)
MCV RBC AUTO: 97 FL (ref 78–100)
MONOCYTES # BLD AUTO: 0.7 10E9/L (ref 0–1.3)
MONOCYTES NFR BLD AUTO: 7.2 %
NEUTROPHILS # BLD AUTO: 8.1 10E9/L (ref 1.6–8.3)
NEUTROPHILS NFR BLD AUTO: 80.4 %
NRBC # BLD AUTO: 0 10*3/UL
NRBC BLD AUTO-RTO: 0 /100
PLATELET # BLD AUTO: 79 10E9/L (ref 150–450)
POTASSIUM SERPL-SCNC: 3.4 MMOL/L (ref 3.4–5.3)
RBC # BLD AUTO: 3.67 10E12/L (ref 3.8–5.2)
SODIUM SERPL-SCNC: 135 MMOL/L (ref 133–144)
WBC # BLD AUTO: 10.1 10E9/L (ref 4–11)

## 2020-05-10 PROCEDURE — 25000132 ZZH RX MED GY IP 250 OP 250 PS 637: Performed by: INTERNAL MEDICINE

## 2020-05-10 PROCEDURE — 80048 BASIC METABOLIC PNL TOTAL CA: CPT | Performed by: HOSPITALIST

## 2020-05-10 PROCEDURE — 85025 COMPLETE CBC W/AUTO DIFF WBC: CPT | Performed by: HOSPITALIST

## 2020-05-10 PROCEDURE — 25000132 ZZH RX MED GY IP 250 OP 250 PS 637: Performed by: HOSPITALIST

## 2020-05-10 PROCEDURE — 86140 C-REACTIVE PROTEIN: CPT | Performed by: HOSPITALIST

## 2020-05-10 PROCEDURE — 99233 SBSQ HOSP IP/OBS HIGH 50: CPT | Performed by: HOSPITALIST

## 2020-05-10 PROCEDURE — 25000132 ZZH RX MED GY IP 250 OP 250 PS 637: Performed by: UROLOGY

## 2020-05-10 PROCEDURE — 25000128 H RX IP 250 OP 636: Performed by: UROLOGY

## 2020-05-10 PROCEDURE — 36415 COLL VENOUS BLD VENIPUNCTURE: CPT | Performed by: HOSPITALIST

## 2020-05-10 PROCEDURE — 12000000 ZZH R&B MED SURG/OB

## 2020-05-10 RX ADMIN — Medication 250 MG: at 08:03

## 2020-05-10 RX ADMIN — MEROPENEM 1 G: 1 INJECTION, POWDER, FOR SOLUTION INTRAVENOUS at 04:19

## 2020-05-10 RX ADMIN — ACETAMINOPHEN 650 MG: 325 TABLET, FILM COATED ORAL at 23:44

## 2020-05-10 RX ADMIN — CARVEDILOL 12.5 MG: 12.5 TABLET, FILM COATED ORAL at 08:04

## 2020-05-10 RX ADMIN — CARVEDILOL 12.5 MG: 12.5 TABLET, FILM COATED ORAL at 17:31

## 2020-05-10 RX ADMIN — ACETAMINOPHEN 650 MG: 325 TABLET, FILM COATED ORAL at 19:02

## 2020-05-10 RX ADMIN — VANCOMYCIN HYDROCHLORIDE 125 MG: 125 CAPSULE ORAL at 20:24

## 2020-05-10 RX ADMIN — OMEPRAZOLE 20 MG: 20 CAPSULE, DELAYED RELEASE ORAL at 08:04

## 2020-05-10 RX ADMIN — VANCOMYCIN HYDROCHLORIDE 125 MG: 125 CAPSULE ORAL at 08:03

## 2020-05-10 RX ADMIN — Medication 2.5 MG: at 17:37

## 2020-05-10 RX ADMIN — Medication 250 MG: at 22:04

## 2020-05-10 RX ADMIN — LISINOPRIL 10 MG: 10 TABLET ORAL at 08:04

## 2020-05-10 RX ADMIN — MEROPENEM 1 G: 1 INJECTION, POWDER, FOR SOLUTION INTRAVENOUS at 11:40

## 2020-05-10 RX ADMIN — SERTRALINE HYDROCHLORIDE 100 MG: 100 TABLET ORAL at 08:03

## 2020-05-10 RX ADMIN — AMLODIPINE BESYLATE 10 MG: 10 TABLET ORAL at 08:04

## 2020-05-10 RX ADMIN — MEROPENEM 1 G: 1 INJECTION, POWDER, FOR SOLUTION INTRAVENOUS at 20:24

## 2020-05-10 ASSESSMENT — ACTIVITIES OF DAILY LIVING (ADL)
ADLS_ACUITY_SCORE: 19

## 2020-05-10 NOTE — PROGRESS NOTES
[facility]  Infectious Disease Progress Note          Assessment and Plan:   IMPRESSION:   1.  An 89-year-old female with acute sepsis, left ureteral obstruction, gram-negative shon bacteremia with Citrobacter.   2.  Prior history of Clostridium difficile, but not for over a year.  3 sulfa allergy,       RECOMMENDATIONS:   1.  Continue meropenem , unusual R pattern but luckily po options    2.  Urologic intervention already accomplished with obstruction present, does not have major renal insufficiency, so full dose treatment.   3.  Improved progressively, sulfa allergy remote but probably real,  po levaquin logical home plan, to po at disposition to complete 2 weeks total po, ie about 8 days more  Po vanco same tx duration             Interval History:   no new complaints and doing well; no cp, sob, n/v/d, or abd pain. T down cx as above               Medications:       amLODIPine  10 mg Oral Daily     carvedilol  12.5 mg Oral BID w/meals     lisinopril  10 mg Oral Daily     meropenem  1 g Intravenous Q8H     omeprazole  20 mg Oral Daily     saccharomyces boulardii  250 mg Oral BID     sertraline  100 mg Oral Daily     sodium chloride (PF)  3 mL Intracatheter Q8H     sodium chloride (PF)  3 mL Intracatheter Q8H     vancomycin  125 mg Oral BID                  Physical Exam:   Blood pressure (!) 151/72, pulse 71, temperature 98  F (36.7  C), temperature source Oral, resp. rate 20, weight 59.6 kg (131 lb 8 oz), SpO2 95 %, not currently breastfeeding.  Wt Readings from Last 2 Encounters:   05/07/20 59.6 kg (131 lb 8 oz)   11/20/19 56.5 kg (124 lb 9.6 oz)     Vital Signs with Ranges  Temp:  [95.7  F (35.4  C)-98  F (36.7  C)] 98  F (36.7  C)  Pulse:  [71] 71  Heart Rate:  [82-90] 90  Resp:  [16-20] 20  BP: (147-171)/(64-77) 151/72  SpO2:  [94 %-95 %] 95 %    Constitutional: Awake, alert, cooperative, no apparent distress   Lungs: Clear to auscultation bilaterally, no crackles or wheezing    Cardiovascular: Regular rate and rhythm, normal S1 and S2, and no murmur noted   Abdomen: Normal bowel sounds, soft, non-distended, non-tender   Skin: No rashes, no cyanosis, no edema   Other:           Data:   All microbiology laboratory data reviewed.  Recent Labs   Lab Test 05/10/20  0628 05/09/20  0618 05/08/20  0621   WBC 10.1 26.6* 28.3*   HGB 11.6* 11.6* 10.9*   HCT 35.6 36.1 33.6*   MCV 97 98 99   PLT 79* 69* 72*     Recent Labs   Lab Test 05/10/20  0628 05/08/20  0621 05/07/20  0454   CR 0.54 0.78 0.93     No lab results found.  Recent Labs   Lab Test 05/09/20  0944 05/09/20  0920 05/08/20  1538 05/08/20  1533 05/06/20  1231 05/06/20  1035 05/06/20  0948 08/25/19  1421 08/25/19  1409   CULT No growth after 18 hours No growth after 18 hours No growth after 2 days No growth after 2 days Cultured on the 1st day of incubation:  Citrobacter freundii complex  Susceptibility testing done on previous specimen  *  Critical Value/Significant Value, preliminary result only, called to and read back by  Paty Maddox RN at 0439 on 5.7.20 by SS.   >100,000 colonies/mL  Citrobacter freundii complex  *  10,000 to 50,000 colonies/mL  Proteus mirabilis  *  Culture in progress Cultured on the 1st day of incubation:  Citrobacter freundii complex  *  Critical Value/Significant Value, preliminary result only, called to and read back by  Roxanna Small RN at 0045 on 5.7.20 by SS.    (Note)  POSITIVE for CITROBACTER SPECIES by [place]ig[place] multiplex nucleic acid  test. Final identification and antimicrobial susceptibility testing  will be verified by standard methods.    Specimen tested with Verigene multiplex, gram-negative blood culture  nucleic acid test for the following targets: Acinetobacter sp.,  Citrobacter sp., Enterobacter sp., Proteus sp., E. coli, K.  pneumoniae/oxytoca, P. aeruginosa, and the following resistance  markers: CTXM, KPC, NDM, VIM, IMP and OXA.    Critical Value/Significant Value called to and read  back by Delmy Yañez RN in RMS5 at 0320 5.7.20. AMD   No growth No growth

## 2020-05-10 NOTE — PROGRESS NOTES
End of Shift Summary  For vital signs and complete assessments, please see documentation flowsheets.     Pertinent assessments: slightly elevated BP, denies any pain, vallecillo in place, adequate output    Major Shift Events   Treatment Plan: IV Merrem, oral Vanco

## 2020-05-10 NOTE — PLAN OF CARE
Pertinent assessments: Alert but confused. VSS on room air. Afebrile. Almanza patent; no hematuria.    Major Shift Events: None  Treatment Plan: IV Merrem, PO Vanco, probiotic.    Discharge Readiness: Medically active  Expected Discharge Date: 5/11  Discharge Disposition: Assisted living Facility  Barriers/Criteria for discharge: Abx

## 2020-05-10 NOTE — PLAN OF CARE
End of Shift Summary  For vital signs and complete assessments, please see documentation flowsheets.     Pertinent assessments:  /77; recheck 151/72. denies any pain, vallecillo discontinue around 1450. Declined breakfast; ate lunch.    Major Shift Events: vallecillo discontinue around 1450.  Treatment Plan: IV Merrem, oral Vanco

## 2020-05-10 NOTE — PROGRESS NOTES
[facility]    Hospitalist Progress Note    Date of Service (when I saw the patient): 05/10/2020  Provider:  Jhoan Baltazar MD   Text Page  7am - 6PM       Assessment & Plan   Summary of Stay: Daya Ignacio is a 89 year old female with known prior history of ESBL E. coli UTI, hypertension, dyslipidemia, admitted on 5/6/2020 with worsening generalized weakness, accompanied with nausea and vomiting.     Problem List:   1. Citrobacter UTI (obstructive)/bacteriemia/sepsis. Severe sepsis secondary to underlying UTI with bacteremia. She admitted with leukopenia but in the next 24 hours and for all the stay has developed leukocytosis with neutrophilia (normal now), and moderate anemia/thrombocytopenia. Afebrile but high CRP that is now in downward trend. Last BC + on 5/6. PCT 26.25 5/9. CRP 67 (281)  2. Left ureteral stone, status post ureteral stent placement last 5/6/2020  3. History of C. difficile colitis back in June 2019  4. History of hypertension. Elevated, not symptomatic. PTA med on hold. I am resuming Norvasc, Carvedilol and Lisinopril at lower dose and will titrate as needed  5. Physical deconditioning  6. Thrombocytopenia sepsis induced     Plan  Inpatient care(high risk for clinical deterioration).  Meropenem IV. H/O ESBL E. Coli.  ID service input appreciated. Sensitivities available. Plan for ten days of oral Levofloxacin after discharge.   Urology following with us.  Highly appreciate input and procedure with ureteral stent placement.  Plans for shockwave lithotripsy or ureteroscopy for stone extraction as outpatient after finishing antibiotic course  As needed APAP.    Antiemetics as needed.  Vancomycin po for prophylaxis.  Florastor added.     DVT Prophylaxis: Pneumatic Compression Devices  Code Status: DNR/DNI  Disposition: Expected discharge on Monday once BC negative x 3 consecutive days, clinically stable.    Interval History   She is denies nausea, diarrhea, fever, chest pain or  shortness of breath.  Reports of feeling to weak and not interested in food. Forgetful and confused at times.    -Data reviewed today: I reviewed all new labs and imaging results over the last 24 hours.     Physical Exam   Temp: 98  F (36.7  C) Temp src: Oral BP: (Abnormal) 151/72 Pulse: 71 Heart Rate: 90 Resp: 20 SpO2: 95 % O2 Device: None (Room air)    Vitals:    05/07/20 1112   Weight: 59.6 kg (131 lb 8 oz)     Vital Signs with Ranges  Temp:  [95.7  F (35.4  C)-98  F (36.7  C)] 98  F (36.7  C)  Pulse:  [71] 71  Heart Rate:  [82-90] 90  Resp:  [16-20] 20  BP: (147-171)/(64-77) 151/72  SpO2:  [94 %-95 %] 95 %  I/O last 3 completed shifts:  In: 457 [P.O.:100; I.V.:357]  Out: 1925 [Urine:1925]    GEN:  Alert, cooperative with memory issues, appears comfortable, NAD.  HEENT:  Normocephalic/atraumatic, no scleral icterus, no nasal discharge, mouth moist.  CV:  Regular rate and rhythm, no murmur or JVD.  S1 + S2 noted, no S3 or S4.  LUNGS:  Clear to auscultation bilaterally without rales/rhonchi/wheezing/retractions.  Symmetric chest rise on inhalation noted.  ABD:  Active bowel sounds, soft, non-tender/non-distended.  No rebound/guarding/rigidity.  EXT:  No edema or cyanosis.  No joint synovitis noted.  SKIN:  Dry to touch, no exanthems noted in the visualized areas.       Medications       amLODIPine  10 mg Oral Daily     carvedilol  12.5 mg Oral BID w/meals     lisinopril  10 mg Oral Daily     meropenem  1 g Intravenous Q8H     omeprazole  20 mg Oral Daily     saccharomyces boulardii  250 mg Oral BID     sertraline  100 mg Oral Daily     sodium chloride (PF)  3 mL Intracatheter Q8H     sodium chloride (PF)  3 mL Intracatheter Q8H     vancomycin  125 mg Oral BID       Data   Recent Labs   Lab 05/10/20  0628 05/09/20  0618 05/08/20  0621 05/07/20 2017 05/07/20  0454 05/06/20  0928   WBC 10.1 26.6* 28.3*  --  28.4* 3.5*   HGB 11.6* 11.6* 10.9*  --  10.3* 13.5   MCV 97 98 99  --  99 99   PLT 79* 69* 72*  --  81* 168    INR  --   --   --   --   --  1.13     --  141  --  139 139   POTASSIUM 3.4  --  3.9 5.0 3.3* 3.8   CHLORIDE 106  --  113*  --  109 105   CO2 25  --  24  --  18* 25   BUN 16  --  25  --  28 24   CR 0.54  --  0.78  --  0.93 0.94   ANIONGAP 4  --  4  --  12 9   DAVE 8.8  --  8.6  --  7.9* 9.9   GLC 85  --  63*  --  85 99   ALBUMIN  --   --   --   --   --  3.7   PROTTOTAL  --   --   --   --   --  7.3   BILITOTAL  --   --   --   --   --  1.3   ALKPHOS  --   --   --   --   --  113   ALT  --   --   --   --   --  12   AST  --   --   --   --   --  15   LIPASE  --   --   --   --   --  42*       No results found for this or any previous visit (from the past 24 hour(s)).     Disclaimer: This note consists of symbols derived from keyboarding, dictation and/or voice recognition software. As a result, there may be errors in the script that have gone undetected. Please consider this when interpreting information found in this chart.

## 2020-05-11 LAB
ANION GAP SERPL CALCULATED.3IONS-SCNC: 5 MMOL/L (ref 3–14)
BASOPHILS # BLD AUTO: 0 10E9/L (ref 0–0.2)
BASOPHILS NFR BLD AUTO: 0 %
BUN SERPL-MCNC: 20 MG/DL (ref 7–30)
CALCIUM SERPL-MCNC: 8.9 MG/DL (ref 8.5–10.1)
CHLORIDE SERPL-SCNC: 105 MMOL/L (ref 94–109)
CO2 SERPL-SCNC: 25 MMOL/L (ref 20–32)
CREAT SERPL-MCNC: 0.5 MG/DL (ref 0.52–1.04)
CRP SERPL-MCNC: 21.5 MG/L (ref 0–8)
DIFFERENTIAL METHOD BLD: ABNORMAL
DOHLE BOD BLD QL SMEAR: PRESENT
EOSINOPHIL # BLD AUTO: 0.1 10E9/L (ref 0–0.7)
EOSINOPHIL NFR BLD AUTO: 2 %
ERYTHROCYTE [DISTWIDTH] IN BLOOD BY AUTOMATED COUNT: 13.8 % (ref 10–15)
GFR SERPL CREATININE-BSD FRML MDRD: 86 ML/MIN/{1.73_M2}
GLUCOSE SERPL-MCNC: 107 MG/DL (ref 70–99)
HCT VFR BLD AUTO: 35.5 % (ref 35–47)
HGB BLD-MCNC: 11.6 G/DL (ref 11.7–15.7)
LYMPHOCYTES # BLD AUTO: 1.3 10E9/L (ref 0.8–5.3)
LYMPHOCYTES NFR BLD AUTO: 24 %
MCH RBC QN AUTO: 31.4 PG (ref 26.5–33)
MCHC RBC AUTO-ENTMCNC: 32.7 G/DL (ref 31.5–36.5)
MCV RBC AUTO: 96 FL (ref 78–100)
MONOCYTES # BLD AUTO: 0.7 10E9/L (ref 0–1.3)
MONOCYTES NFR BLD AUTO: 13 %
NEUTROPHILS # BLD AUTO: 3.2 10E9/L (ref 1.6–8.3)
NEUTROPHILS NFR BLD AUTO: 61 %
PLATELET # BLD AUTO: 96 10E9/L (ref 150–450)
PLATELET # BLD EST: ABNORMAL 10*3/UL
POTASSIUM SERPL-SCNC: 3.2 MMOL/L (ref 3.4–5.3)
POTASSIUM SERPL-SCNC: 4.4 MMOL/L (ref 3.4–5.3)
PROCALCITONIN SERPL-MCNC: 6.85 NG/ML
RBC # BLD AUTO: 3.69 10E12/L (ref 3.8–5.2)
RBC MORPH BLD: ABNORMAL
SODIUM SERPL-SCNC: 135 MMOL/L (ref 133–144)
VARIANT LYMPHS BLD QL SMEAR: PRESENT
WBC # BLD AUTO: 5.3 10E9/L (ref 4–11)

## 2020-05-11 PROCEDURE — 84145 PROCALCITONIN (PCT): CPT | Performed by: HOSPITALIST

## 2020-05-11 PROCEDURE — 12000000 ZZH R&B MED SURG/OB

## 2020-05-11 PROCEDURE — 25000128 H RX IP 250 OP 636: Performed by: UROLOGY

## 2020-05-11 PROCEDURE — 25000132 ZZH RX MED GY IP 250 OP 250 PS 637: Performed by: INTERNAL MEDICINE

## 2020-05-11 PROCEDURE — 25000132 ZZH RX MED GY IP 250 OP 250 PS 637: Performed by: HOSPITALIST

## 2020-05-11 PROCEDURE — 85025 COMPLETE CBC W/AUTO DIFF WBC: CPT | Performed by: HOSPITALIST

## 2020-05-11 PROCEDURE — 99233 SBSQ HOSP IP/OBS HIGH 50: CPT | Performed by: HOSPITALIST

## 2020-05-11 PROCEDURE — 86140 C-REACTIVE PROTEIN: CPT | Performed by: HOSPITALIST

## 2020-05-11 PROCEDURE — 84132 ASSAY OF SERUM POTASSIUM: CPT | Performed by: HOSPITALIST

## 2020-05-11 PROCEDURE — 36415 COLL VENOUS BLD VENIPUNCTURE: CPT | Performed by: HOSPITALIST

## 2020-05-11 PROCEDURE — 80048 BASIC METABOLIC PNL TOTAL CA: CPT | Performed by: HOSPITALIST

## 2020-05-11 PROCEDURE — 25000132 ZZH RX MED GY IP 250 OP 250 PS 637: Performed by: UROLOGY

## 2020-05-11 RX ORDER — CLONIDINE HYDROCHLORIDE 0.1 MG/1
0.1 TABLET ORAL 2 TIMES DAILY
Status: DISCONTINUED | OUTPATIENT
Start: 2020-05-11 | End: 2020-05-12 | Stop reason: HOSPADM

## 2020-05-11 RX ORDER — LISINOPRIL 20 MG/1
20 TABLET ORAL DAILY
Status: DISCONTINUED | OUTPATIENT
Start: 2020-05-12 | End: 2020-05-12 | Stop reason: HOSPADM

## 2020-05-11 RX ORDER — CARVEDILOL 25 MG/1
25 TABLET ORAL 2 TIMES DAILY WITH MEALS
Status: DISCONTINUED | OUTPATIENT
Start: 2020-05-11 | End: 2020-05-12 | Stop reason: HOSPADM

## 2020-05-11 RX ADMIN — AMLODIPINE BESYLATE 10 MG: 10 TABLET ORAL at 09:11

## 2020-05-11 RX ADMIN — POTASSIUM CHLORIDE 20 MEQ: 1500 TABLET, EXTENDED RELEASE ORAL at 12:40

## 2020-05-11 RX ADMIN — Medication 2.5 MG: at 17:59

## 2020-05-11 RX ADMIN — Medication 2.5 MG: at 04:28

## 2020-05-11 RX ADMIN — ACETAMINOPHEN 650 MG: 325 TABLET, FILM COATED ORAL at 22:04

## 2020-05-11 RX ADMIN — ONDANSETRON 4 MG: 4 TABLET, ORALLY DISINTEGRATING ORAL at 10:31

## 2020-05-11 RX ADMIN — Medication 250 MG: at 09:11

## 2020-05-11 RX ADMIN — ACETAMINOPHEN 650 MG: 325 TABLET, FILM COATED ORAL at 14:50

## 2020-05-11 RX ADMIN — POTASSIUM CHLORIDE 40 MEQ: 1500 TABLET, EXTENDED RELEASE ORAL at 09:13

## 2020-05-11 RX ADMIN — CLONIDINE HYDROCHLORIDE 0.1 MG: 0.1 TABLET ORAL at 20:29

## 2020-05-11 RX ADMIN — VANCOMYCIN HYDROCHLORIDE 125 MG: 125 CAPSULE ORAL at 20:29

## 2020-05-11 RX ADMIN — VANCOMYCIN HYDROCHLORIDE 125 MG: 125 CAPSULE ORAL at 09:11

## 2020-05-11 RX ADMIN — LISINOPRIL 10 MG: 10 TABLET ORAL at 09:11

## 2020-05-11 RX ADMIN — Medication 250 MG: at 20:29

## 2020-05-11 RX ADMIN — OMEPRAZOLE 20 MG: 20 CAPSULE, DELAYED RELEASE ORAL at 09:11

## 2020-05-11 RX ADMIN — SERTRALINE HYDROCHLORIDE 100 MG: 100 TABLET ORAL at 09:11

## 2020-05-11 RX ADMIN — MEROPENEM 1 G: 1 INJECTION, POWDER, FOR SOLUTION INTRAVENOUS at 20:28

## 2020-05-11 RX ADMIN — ACETAMINOPHEN 650 MG: 325 TABLET, FILM COATED ORAL at 04:28

## 2020-05-11 RX ADMIN — MEROPENEM 1 G: 1 INJECTION, POWDER, FOR SOLUTION INTRAVENOUS at 12:39

## 2020-05-11 RX ADMIN — MEROPENEM 1 G: 1 INJECTION, POWDER, FOR SOLUTION INTRAVENOUS at 04:06

## 2020-05-11 RX ADMIN — CARVEDILOL 25 MG: 25 TABLET, FILM COATED ORAL at 17:54

## 2020-05-11 RX ADMIN — CARVEDILOL 12.5 MG: 12.5 TABLET, FILM COATED ORAL at 09:11

## 2020-05-11 ASSESSMENT — ACTIVITIES OF DAILY LIVING (ADL)
ADLS_ACUITY_SCORE: 19

## 2020-05-11 NOTE — PROGRESS NOTES
[facility]    Hospitalist Progress Note    Date of Service (when I saw the patient): 05/11/2020  Provider:  Jhoan Baltazar MD   Text Page  7am - 6PM       Assessment & Plan   Summary of Stay: Daya Ignacio is a 89 year old female with known prior history of ESBL E. coli UTI, hypertension, dyslipidemia, admitted on 5/6/2020 with worsening generalized weakness, accompanied with nausea and vomiting.     Problem List:   1. Citrobacter UTI (obstructive)/bacteriemia/sepsis. Severe sepsis secondary to underlying UTI with bacteremia. She admitted with leukopenia but in the next 24 hours and for all the stay has developed leukocytosis with neutrophilia (normal now), and moderate anemia/thrombocytopenia. Afebrile but high CRP that is now in downward trend. Last BC + on 5/6. PCT 26.25 5/9. CRP 67 (281)  2. Left ureteral stone, status post ureteral stent placement last 5/6/2020  3. History of C. difficile colitis back in June 2019  4. History of hypertension. Elevated SBP, not symptomatic. PTA med on hold. Norvasc, Carvedilol and Lisinopril as PTA  5. Physical deconditioning, severe  6. Thrombocytopenia sepsis induced. Moderate and stable.      Plan  Inpatient care(high risk for clinical deterioration).  Meropenem IV. H/O ESBL E. Coli.  ID service input appreciated. Sensitivities available. Plan for ten days of oral Levofloxacin after discharge.   Urology following with us.  Highly appreciate input and procedure with ureteral stent placement.  Plans for shockwave lithotripsy or ureteroscopy for stone extraction as outpatient after finishing antibiotic course  As needed APAP.    Antiemetics as needed.  Vancomycin po for prophylaxis.  Florastor added.     DVT Prophylaxis: Pneumatic Compression Devices  Code Status: DNR/DNI  Disposition: Expected discharge to TCU d/t deconditioning if bed available. BC negative x 3 consecutive days, clinically stable.    Interval History   She is having nausea again today. NO  diarrhea, fever, chest pain or shortness of breath.  Resting in chair by the window, extremely weak and not interested in food.   I am requesting PT assessment. Forgetful and confused at times.  Follow up lab w/u is reassuring.     -Data reviewed today: I reviewed all new labs and imaging results over the last 24 hours.     Physical Exam   Temp: 95.6  F (35.3  C) Temp src: Oral BP: (Abnormal) 179/67 Pulse: 71 Heart Rate: 75 Resp: 24 SpO2: 95 % O2 Device: None (Room air)    Vitals:    05/07/20 1112   Weight: 59.6 kg (131 lb 8 oz)     Vital Signs with Ranges  Temp:  [95.6  F (35.3  C)-98.1  F (36.7  C)] 95.6  F (35.3  C)  Pulse:  [71] 71  Heart Rate:  [75-82] 75  Resp:  [18-24] 24  BP: (144-189)/(60-78) 179/67  SpO2:  [93 %-96 %] 95 %  I/O last 3 completed shifts:  In: 120 [P.O.:120]  Out: 850 [Urine:850]    GEN:  Alert, cooperative with memory issues, appears comfortable, NAD.  HEENT:  Normocephalic/atraumatic, no scleral icterus, no nasal discharge, mouth moist.  CV:  Regular rate and rhythm, no murmur or JVD.  S1 + S2 noted, no S3 or S4.  LUNGS:  Clear to auscultation bilaterally without rales/rhonchi/wheezing/retractions.  Symmetric chest rise on inhalation noted.  ABD:  Active bowel sounds, soft, non-tender/non-distended.  No rebound/guarding/rigidity.  EXT:  No edema or cyanosis.  No joint synovitis noted.  SKIN:  Dry to touch, no exanthems noted in the visualized areas.       Medications       amLODIPine  10 mg Oral Daily     carvedilol  12.5 mg Oral BID w/meals     lisinopril  10 mg Oral Daily     meropenem  1 g Intravenous Q8H     omeprazole  20 mg Oral Daily     saccharomyces boulardii  250 mg Oral BID     sertraline  100 mg Oral Daily     sodium chloride (PF)  3 mL Intracatheter Q8H     sodium chloride (PF)  3 mL Intracatheter Q8H     vancomycin  125 mg Oral BID       Data   Recent Labs   Lab 05/11/20  0717 05/10/20  0628 05/09/20  0618 05/08/20  0621 05/06/20  0928   WBC 5.3 10.1 26.6* 28.3*   < > 3.5*    HGB 11.6* 11.6* 11.6* 10.9*   < > 13.5   MCV 96 97 98 99   < > 99   PLT 96* 79* 69* 72*   < > 168   INR  --   --   --   --   --  1.13    135  --  141   < > 139   POTASSIUM 3.2* 3.4  --  3.9   < > 3.8   CHLORIDE 105 106  --  113*   < > 105   CO2 25 25  --  24   < > 25   BUN 20 16  --  25   < > 24   CR 0.50* 0.54  --  0.78   < > 0.94   ANIONGAP 5 4  --  4   < > 9   DAVE 8.9 8.8  --  8.6   < > 9.9   * 85  --  63*   < > 99   ALBUMIN  --   --   --   --   --  3.7   PROTTOTAL  --   --   --   --   --  7.3   BILITOTAL  --   --   --   --   --  1.3   ALKPHOS  --   --   --   --   --  113   ALT  --   --   --   --   --  12   AST  --   --   --   --   --  15   LIPASE  --   --   --   --   --  42*    < > = values in this interval not displayed.       No results found for this or any previous visit (from the past 24 hour(s)).     Disclaimer: This note consists of symbols derived from keyboarding, dictation and/or voice recognition software. As a result, there may be errors in the script that have gone undetected. Please consider this when interpreting information found in this chart.

## 2020-05-11 NOTE — PROGRESS NOTES
Discharge Planner   Discharge Plans in progress: Plan will be to [facility] tomorrow per MD.. called [facility] to update and set up early transport with H/C for 10:30   Barriers to discharge plan: none. Will update son of transport time.    Follow up plan:      05/11/20 1100   Final Resources   Assisted Living   ([facility]  [phone] fax [phone])   Home Care   ([facility] [phone] fax [phone])        [facility]  Inpatient Care Coordination   [phone]  [facility]     [facility]  Inpatient Care Coordination   [phone]  [facility]     Entered by: Corinne C. White 05/11/2020 11:17 AM

## 2020-05-11 NOTE — PROGRESS NOTES
[facility]  Infectious Disease Progress Note          Assessment and Plan:   IMPRESSION:   1.  An 89-year-old female with acute sepsis, left ureteral obstruction, gram-negative shon bacteremia with Citrobacter.   2.  Prior history of Clostridium difficile, but not for over a year.  3 sulfa allergy,       RECOMMENDATIONS:   1.  Continue meropenem , unusual R pattern but luckily po options    2.  Urologic intervention already accomplished with obstruction present, does not have major renal insufficiency, so full dose treatment.   3.  Improved progressively, sulfa allergy remote but probably real,  po levaquin logical home plan, to po at disposition to complete 2 weeks total po, ie about 7 days more  Po vanco same tx duration             Interval History:   no new complaints and doing well; no cp, sob, n/v/d, or abd pain. T down cx as above               Medications:       amLODIPine  10 mg Oral Daily     carvedilol  25 mg Oral BID w/meals     cloNIDine  0.1 mg Oral BID     [START ON 5/12/2020] lisinopril  20 mg Oral Daily     meropenem  1 g Intravenous Q8H     omeprazole  20 mg Oral Daily     saccharomyces boulardii  250 mg Oral BID     sertraline  100 mg Oral Daily     sodium chloride (PF)  3 mL Intracatheter Q8H     sodium chloride (PF)  3 mL Intracatheter Q8H     vancomycin  125 mg Oral BID                  Physical Exam:   Blood pressure (!) 180/75, pulse 71, temperature 97.7  F (36.5  C), temperature source Axillary, resp. rate 20, weight 59.6 kg (131 lb 8 oz), SpO2 93 %, not currently breastfeeding.  Wt Readings from Last 2 Encounters:   05/07/20 59.6 kg (131 lb 8 oz)   11/20/19 56.5 kg (124 lb 9.6 oz)     Vital Signs with Ranges  Temp:  [95.6  F (35.3  C)-98.1  F (36.7  C)] 97.7  F (36.5  C)  Pulse:  [71] 71  Heart Rate:  [74-82] 74  Resp:  [18-24] 20  BP: (144-189)/(60-78) 180/75  SpO2:  [93 %-96 %] 93 %    Constitutional: Awake, alert, cooperative, no apparent distress   Lungs: Clear to  auscultation bilaterally, no crackles or wheezing   Cardiovascular: Regular rate and rhythm, normal S1 and S2, and no murmur noted   Abdomen: Normal bowel sounds, soft, non-distended, non-tender   Skin: No rashes, no cyanosis, no edema   Other:           Data:   All microbiology laboratory data reviewed.  Recent Labs   Lab Test 05/11/20  0717 05/10/20  0628 05/09/20  0618   WBC 5.3 10.1 26.6*   HGB 11.6* 11.6* 11.6*   HCT 35.5 35.6 36.1   MCV 96 97 98   PLT 96* 79* 69*     Recent Labs   Lab Test 05/11/20  0717 05/10/20  0628 05/08/20  0621   CR 0.50* 0.54 0.78     No lab results found.  Recent Labs   Lab Test 05/09/20  0944 05/09/20  0920 05/08/20  1538 05/08/20  1533 05/06/20  1231 05/06/20  1035 05/06/20  0948 08/25/19  1421 08/25/19  1409   CULT No growth after 2 days No growth after 2 days No growth after 3 days No growth after 3 days Cultured on the 1st day of incubation:  Citrobacter freundii complex  Susceptibility testing done on previous specimen  *  Critical Value/Significant Value, preliminary result only, called to and read back by  Paty Maddox RN at 0439 on 5.7.20 by SS.   >100,000 colonies/mL  Citrobacter freundii complex  *  10,000 to 50,000 colonies/mL  Proteus mirabilis  *  10,000 to 50,000 colonies/mL  Enterococcus faecium  *  Culture in progress Cultured on the 1st day of incubation:  Citrobacter freundii complex  *  Critical Value/Significant Value, preliminary result only, called to and read back by  Roxanna Small RN at 0045 on 5.7.20 by SS.    (Note)  POSITIVE for CITROBACTER SPECIES by Yeelionigene multiplex nucleic acid  test. Final identification and antimicrobial susceptibility testing  will be verified by standard methods.    Specimen tested with Verigene multiplex, gram-negative blood culture  nucleic acid test for the following targets: Acinetobacter sp.,  Citrobacter sp., Enterobacter sp., Proteus sp., E. coli, K.  pneumoniae/oxytoca, P. aeruginosa, and the following  resistance  markers: CTXM, KPC, NDM, VIM, IMP and OXA.    Critical Value/Significant Value called to and read back by Delmy Yañez RN in [facility] at 0320 5.7.20. AMD   No growth No growth

## 2020-05-11 NOTE — PROGRESS NOTES
05/11/20 1148   Significant Event   Significant Event Critical result/value   Pro calcitonin 6.85. MD notified.

## 2020-05-11 NOTE — PLAN OF CARE
End of Shift Summary  For vital signs and complete assessments, please see documentation flowsheets.     Pertinent assessments: disoriented to situation and place, forgetful and confused at times. Tylenol, oxy, and heat for back pain. voiding without difficulty.    Major Shift Events Nausea, zofran x1, effective. K replaced, 4.4. Elevated BP, medications changed.  Treatment Plan: Merrem, oral Vanco

## 2020-05-11 NOTE — PLAN OF CARE
End of Shift Summary  For vital signs and complete assessments, please see documentation flowsheets.     Pertinent assessments: LS diminished. C/o pain. Gave oxycodone 2.5mg x 1 and Tylenol x 1. Has eating pad for back pain. Almanza removed on AM shift. Voided 150ml.   Treatment Plan: Yari and PO Vanco.    Discharge Readiness: Medically active  Expected Discharge Date: tomorrow.  Discharge Disposition: Assisted living Facility

## 2020-05-11 NOTE — PROGRESS NOTES
End of Shift Summary  For vital signs and complete assessments, please see documentation flowsheets.     Pertinent assessments: disoriented to situation, forgetful and confused at times, elevated BP, c/o leg and abdominal pain Tylenol x 2 and Oxycodone x 2, voiding without difficulty    Major Shift Events   Treatment Plan: Merrem, oral Vanco

## 2020-05-12 VITALS
RESPIRATION RATE: 20 BRPM | DIASTOLIC BLOOD PRESSURE: 57 MMHG | OXYGEN SATURATION: 95 % | TEMPERATURE: 96 F | SYSTOLIC BLOOD PRESSURE: 136 MMHG | BODY MASS INDEX: 25.68 KG/M2 | WEIGHT: 131.5 LBS | HEART RATE: 80 BPM

## 2020-05-12 LAB — POTASSIUM SERPL-SCNC: 4.2 MMOL/L (ref 3.4–5.3)

## 2020-05-12 PROCEDURE — 25000132 ZZH RX MED GY IP 250 OP 250 PS 637: Performed by: UROLOGY

## 2020-05-12 PROCEDURE — 25000132 ZZH RX MED GY IP 250 OP 250 PS 637: Performed by: INTERNAL MEDICINE

## 2020-05-12 PROCEDURE — 99239 HOSP IP/OBS DSCHRG MGMT >30: CPT | Performed by: HOSPITALIST

## 2020-05-12 PROCEDURE — 84132 ASSAY OF SERUM POTASSIUM: CPT | Performed by: HOSPITALIST

## 2020-05-12 PROCEDURE — 25000128 H RX IP 250 OP 636: Performed by: UROLOGY

## 2020-05-12 PROCEDURE — 25000132 ZZH RX MED GY IP 250 OP 250 PS 637: Performed by: HOSPITALIST

## 2020-05-12 PROCEDURE — 36415 COLL VENOUS BLD VENIPUNCTURE: CPT | Performed by: HOSPITALIST

## 2020-05-12 RX ORDER — VANCOMYCIN HYDROCHLORIDE 125 MG/1
125 CAPSULE ORAL 2 TIMES DAILY
Qty: 14 CAPSULE | Refills: 0 | Status: ON HOLD | OUTPATIENT
Start: 2020-05-12 | End: 2020-05-28

## 2020-05-12 RX ORDER — ONDANSETRON 4 MG/1
4 TABLET, FILM COATED ORAL EVERY 6 HOURS PRN
Qty: 12 TABLET | Refills: 0 | Status: ON HOLD | OUTPATIENT
Start: 2020-05-12 | End: 2020-05-28

## 2020-05-12 RX ORDER — LEVOFLOXACIN 500 MG/1
500 TABLET, FILM COATED ORAL DAILY
Status: DISCONTINUED | OUTPATIENT
Start: 2020-05-12 | End: 2020-05-12 | Stop reason: HOSPADM

## 2020-05-12 RX ORDER — LEVOFLOXACIN 500 MG/1
500 TABLET, FILM COATED ORAL DAILY
Qty: 6 TABLET | Refills: 0 | Status: SHIPPED | OUTPATIENT
Start: 2020-05-13 | End: 2020-05-19

## 2020-05-12 RX ADMIN — MEROPENEM 1 G: 1 INJECTION, POWDER, FOR SOLUTION INTRAVENOUS at 03:27

## 2020-05-12 RX ADMIN — LEVOFLOXACIN 500 MG: 500 TABLET, FILM COATED ORAL at 09:30

## 2020-05-12 RX ADMIN — SERTRALINE HYDROCHLORIDE 100 MG: 100 TABLET ORAL at 08:10

## 2020-05-12 RX ADMIN — LISINOPRIL 20 MG: 20 TABLET ORAL at 08:10

## 2020-05-12 RX ADMIN — ONDANSETRON 4 MG: 4 TABLET, ORALLY DISINTEGRATING ORAL at 08:39

## 2020-05-12 RX ADMIN — OMEPRAZOLE 20 MG: 20 CAPSULE, DELAYED RELEASE ORAL at 08:10

## 2020-05-12 RX ADMIN — AMLODIPINE BESYLATE 10 MG: 10 TABLET ORAL at 08:10

## 2020-05-12 RX ADMIN — VANCOMYCIN HYDROCHLORIDE 125 MG: 125 CAPSULE ORAL at 08:10

## 2020-05-12 RX ADMIN — Medication 250 MG: at 08:10

## 2020-05-12 RX ADMIN — CLONIDINE HYDROCHLORIDE 0.1 MG: 0.1 TABLET ORAL at 08:10

## 2020-05-12 RX ADMIN — CARVEDILOL 25 MG: 25 TABLET, FILM COATED ORAL at 08:10

## 2020-05-12 ASSESSMENT — ACTIVITIES OF DAILY LIVING (ADL)
ADLS_ACUITY_SCORE: 19
ADLS_ACUITY_SCORE: 18

## 2020-05-12 NOTE — PLAN OF CARE
End of Shift Summary  For vital signs and complete assessments, please see documentation flowsheets.     Pertinent assessments: Denies pain, SOB and nausea. Voiding without difficulties.  Small bowel movement this shift.  Alarms on for safety, disoriented to situation and time, reoriented.     Major Shift Events: Uneventful     Treatment Plan: Continue with PO Vanco and IV Merrem    Discharge Readiness: Medically active  Expected Discharge Date: Transport scheduled for 10:30.    Discharge Disposition: Assisted living Facility  Barriers/Criteria for discharge: Abx regimen

## 2020-05-12 NOTE — DISCHARGE SUMMARY
[facility]    Discharge Summary  Hospitalist    Date of Admission:  5/6/2020  Date of Discharge:  5/12/2020  Provider:  Jhoan Baltazar MD  Date of Service (when I last saw the patient): 05/12/20    Discharge Diagnoses   1.  Citrobacter freundii pyelonephritis with left ureter obstructing stone-hydro/nephrosis bacteremia/sepsis.  2.  Left ureteral stone status stent placement.  3.  Moderate thrombocytopenia sepsis induced, stable.  4.  Essential hypertension.  5.  History of C. difficile colitis.  6.  Severe generalized muscle deconditioning.  7.  Intermittent episodes of nausea.  8.  History of ESBL E. coli infection.  9.  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     Other medical issues:  Past Medical History:   Diagnosis Date     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 4/16/2014     h/o Clostridium difficile colitis 06/2019     Hypertension      Lumbar compression fracture (H)      Macular degeneration (senile) of retina, unspecified      Upper GI bleed 06/2019     Urinary tract infection due to extended-spectrum beta lactamase (ESBL)-producing Klebsiella 05/2019    resistant to Bactrim       History of Present Illness   Daya Ignacio is an 89 year old female who presented with weakness, nausea and vomiting.  Please see the admission history and physical for full details.    Hospital Course   Summary of Stay: Daya Ignacio is a 89 year old female with known prior history of ESBL E. coli UTI, hypertension, dyslipidemia, admitted on 5/6/2020 with worsening generalized weakness, accompanied with nausea and vomiting.  Her hospital course has been characterized for a slow to resolve infection, she has been treated along with infectious disease.  She has been on meropenem based on her history of ESBL E. coli infection.  She grew up Citrobacter freundii > 100,000 colonies as well as Proteus mirabilis 10,000 colonies and enterococcus faeceum  10,000 colonies in URINE: Collagen.  The later seems to be VRE  per micro report.  In her current clinical picture, it does not appears to be playing an important role in her infection/sepsis. Suspect it is a colonization or carrier state.  She will complete 1 more week of oral antibiotic with levofloxacin per infectious disease recommendation.  Based on her past medical history of C. difficile colitis he had been on prophylactic vancomycin here, she will continue on Vanco until antibiotic course is completed.  Probiotic is also added to her treatment.  Her most relevant clinical problem at this point is severe generalized muscle deconditioning on top of her already declining memory.  She will have physical therapy and Occupational Therapy in her shelter.  She is a scheduled to follow-up with urology in office for stent retrieval and definitive treatment of ureteral stone.  She should follow-up with her primary care physician.    Problem List:   1. Citrobacter freundii pyelonephritis with left ureteral(obstructive)stone/bacteriemia/sepsis. Severe sepsis secondary to underlying UTI with bacteremia. She admitted with leukopenia but in the next 24 hours and for all the stay has developed leukocytosis with neutrophilia (normal now), and moderate anemia/thrombocytopenia. Afebrile but high CRP that is now in downward trend. Last BC + on 5/6. PCT 6.85 (26.25) 5/9. CRP 21.5 (281).   2. Left ureteral stone, status post ureteral stent placement last 5/6/2020  3. History of C. difficile colitis back in June 2019  4. History of hypertension. Elevated SBP, not symptomatic. PTA med on hold. Norvasc, Carvedilol and Lisinopril as PTA  5. Physical deconditioning, severe  6. Thrombocytopenia sepsis induced. Moderate and stable.   # Discharge Pain Plan:    - Patient currently has NO PAIN and is not being prescribed pain medications on discharge.      Significant Results and Procedures   See below    Pending Results     Unresulted Labs Ordered in the Past 30 Days of this Admission     Date and Time  Order Name Status Description    5/9/2020 0852 Blood culture Preliminary     5/9/2020 0852 Blood culture Preliminary     5/8/2020 1500 Blood culture Preliminary     5/8/2020 1500 Blood culture Preliminary     5/6/2020 1110 Urine Culture Aerobic Bacterial Preliminary           Code Status   DNR / DNI       Primary Care Physician   DINORAH DISLA    GEN:  Alert, forgetful, appears comfortable, NAD.  HEENT:  Normocephalic/atraumatic, no scleral icterus, no nasal discharge, mouth moist.  CV:  Regular rate and rhythm, no murmur or JVD.  S1 + S2 noted, no S3 or S4.  LUNGS:  Clear to auscultation bilaterally without rales/rhonchi/wheezing/retractions.  Symmetric chest rise on inhalation noted.  ABD:  Active bowel sounds, soft, non-tender/non-distended.  No rebound/guarding/rigidity.  EXT:  No edema or cyanosis.  No joint synovitis noted.  SKIN:  Dry to touch, no exanthems noted in the visualized areas.     Discharge Disposition   Discharged to home    Consultations This Hospital Stay   UROLOGY IP CONSULT  INFECTIOUS DISEASES IP CONSULT  WOUND OSTOMY CONTINENCE NURSE  IP CONSULT  WOUND OSTOMY CONTINENCE NURSE  IP CONSULT  SOCIAL WORK IP CONSULT  PHYSICAL THERAPY ADULT IP CONSULT  PHYSICAL THERAPY ADULT IP CONSULT  OCCUPATIONAL THERAPY ADULT IP CONSULT    Time Spent on this Encounter   I, Jhoan Baltazar MD, personally saw the patient today and spent greater than 30 minutes discharging this patient.     Discharge Orders      Home Care PT Referral for Hospital Discharge      Home Care OT Referral for Hospital Discharge      Reason for your hospital stay    Acute pyelonephritis 2/2 stone obstruction .     Follow-up and recommended labs and tests     Follow up with primary care provider, DINORAH DISLA, within 7 days for hospital follow- up.  No follow up labs or test are needed.  Urology as per plan     Activity    Your activity upon discharge: activity as tolerated     MD face to face encounter    Documentation of Face to  Face and Certification for Home Health Services    I certify that patient: Daya Ignacio is under my care and that I, or a nurse practitioner or physician's assistant working with me, had a face-to-face encounter that meets the physician face-to-face encounter requirements with this patient on: 5/12/2020.    This encounter with the patient was in whole, or in part, for the following medical condition, which is the primary reason for home health care:   Pyelonephritis/bacteriemia/sepsis.  Obstructive uropathy, L ureteral stone and hydro stented  Severe deconditioning  Memory deficit  ESBL infection     I certify that, based on my findings, the following services are medically necessary home health services: Occupational Therapy and Physical Therapy.    My clinical findings support the need for the above services because: Occupational Therapy Services are needed to assess and treat cognitive ability and address ADL safety due to impairment in memory deficit. and Physical Therapy Services are needed to assess and treat the following functional impairments: Severe deconditioning.    Further, I certify that my clinical findings support that this patient is homebound (i.e. absences from home require considerable and taxing effort and are for medical reasons or Worship services or infrequently or of short duration when for other reasons) because: Requires assistance of another person or specialized equipment to access medical services because patient: Is prone to wander/get lost without assistance...    Based on the above findings. I certify that this patient is confined to the home and needs intermittent skilled nursing care, physical therapy and/or speech therapy.  The patient is under my care, and I have initiated the establishment of the plan of care.  This patient will be followed by a physician who will periodically review the plan of care.  Physician/Provider to provide follow up care: Johann Hall  hospital physician (the Medicare certified [facility] provider): Jhoan Baltazar MD  Physician Signature: See electronic signature associated with these discharge orders.  Date: 5/12/2020     DNR/DNI     Diet    Follow this diet upon discharge:      Regular Diet Adult     Discharge Medications   Current Discharge Medication List      START taking these medications    Details   levofloxacin (LEVAQUIN) 500 MG tablet Take 1 tablet (500 mg) by mouth daily for 6 days  Qty: 6 tablet, Refills: 0    Comments: Future refills by PCP Dr. DINORAH DISLA with phone number [phone].  Associated Diagnoses: Acute pyelonephritis      ondansetron (ZOFRAN) 4 MG tablet Take 1 tablet (4 mg) by mouth every 6 hours as needed for nausea  Qty: 12 tablet, Refills: 0    Comments: Future refills by PCP Dr. DINORAH DISLA with phone number [phone].  Associated Diagnoses: Nausea      vancomycin (VANCOCIN) 125 MG capsule Take 1 capsule (125 mg) by mouth 2 times daily for 7 days  Qty: 14 capsule, Refills: 0    Comments: Future refills by PCP Dr. DINORAH DISLA with phone number [phone].  Associated Diagnoses: Diarrhea, unspecified type         CONTINUE these medications which have NOT CHANGED    Details   !! acetaminophen (TYLENOL) 500 MG tablet Take 500 mg by mouth 3 times daily      !! acetaminophen (TYLENOL) 500 MG tablet Take 500 mg by mouth 3 times daily as needed for mild pain      amLODIPine (NORVASC) 10 MG tablet Take 10 mg by mouth daily      Calcium Carbonate (CALCIUM 600 PO) Take 1 tablet by mouth 2 times daily       !! carvedilol (COREG) 25 MG tablet Take 25 mg by mouth At Bedtime      !! carvedilol (COREG) 25 MG tablet Take 12.5 mg by mouth every morning       Cholecalciferol (VITAMIN D3) 2000 UNITS CAPS Take 2,000 Units by mouth daily (with dinner)      cloNIDine (CATAPRES) 0.1 MG tablet Take 0.1 mg by mouth 2 times daily      diclofenac (VOLTAREN) 1 % topical gel Apply 2 g topically 3 times daily Apply to right knee       docusate sodium (COLACE) 100 MG capsule Take 1 capsule (100 mg) by mouth At Bedtime    Associated Diagnoses: Closed fracture of proximal end of right tibia and fibula with routine healing, subsequent encounter; S/P ORIF (open reduction internal fixation) fracture      gabapentin (NEURONTIN) 100 MG capsule Take 2 capsules (200 mg) by mouth 2 times daily  Qty: 120 capsule, Refills: 0    Associated Diagnoses: Other chronic pain      isosorbide dinitrate (ISORDIL) 10 MG tablet Take 1 tablet (10 mg) by mouth 3 times daily  Qty: 270 tablet, Refills: 3    Associated Diagnoses: Non-STEMI (non-ST elevated myocardial infarction) (H)      lisinopril (ZESTRIL) 30 MG tablet Take 30 mg by mouth daily      Multiple Vitamins-Minerals (PRESERVISION AREDS) CAPS Take 1 capsule by mouth 2 times daily       omeprazole 20 MG tablet Take 20 mg by mouth daily       ondansetron (ZOFRAN-ODT) 4 MG ODT tab Take 1 tablet (4 mg) by mouth every 6 hours as needed for nausea or vomiting    Associated Diagnoses: Tibia/fibula fracture, right, closed, initial encounter      oxyCODONE (ROXICODONE) 5 MG tablet Take 2.5 mg by mouth 3 times daily      polyethylene glycol (MIRALAX/GLYCOLAX) packet Take 17 g by mouth daily as needed for constipation    Associated Diagnoses: Tibia/fibula fracture, right, closed, initial encounter      !! senna-docusate (SENOKOT-S/PERICOLACE) 8.6-50 MG tablet Take 2 tablets by mouth 2 times daily as needed for constipation    Associated Diagnoses: Tibia/fibula fracture, right, closed, initial encounter      !! SENNA-docusate sodium (SENNA S) 8.6-50 MG tablet Take 1 tablet by mouth At Bedtime    Associated Diagnoses: Constipation, unspecified constipation type      sertraline (ZOLOFT) 100 MG tablet Take 1 tablet (100 mg) by mouth daily  Qty: 90 tablet, Refills: 1    Associated Diagnoses: Generalized anxiety disorder      vitamin C (ASCORBIC ACID) 500 MG tablet Take 500 mg by mouth daily at 4pm        !! - Potential duplicate  medications found. Please discuss with provider.      STOP taking these medications       cephALEXin (KEFLEX) 250 MG capsule Comments:   Reason for Stopping:             Allergies   Allergies   Allergen Reactions     Atorvastatin Muscle Pain (Myalgia)     Macrobid [Nitrofurantoin Anhydrous] Other (See Comments)     Body aches     Nitrofurantoin Muscle Pain (Myalgia) and Unknown     Body aches       Seasonal Allergies Unknown     Sulfa Drugs Other (See Comments)     Tolerated Bactrim May 2019  Entire family has allergy to Sulfa     Sulfasalazine Unknown     Data   Most Recent 3 CBC's:  Recent Labs   Lab Test 05/11/20  0717 05/10/20  0628 05/09/20  0618   WBC 5.3 10.1 26.6*   HGB 11.6* 11.6* 11.6*   MCV 96 97 98   PLT 96* 79* 69*      Most Recent 3 BMP's:  Recent Labs   Lab Test 05/12/20  0626 05/11/20  1630 05/11/20  0717 05/10/20  0628 05/08/20  0621   NA  --   --  135 135 141   POTASSIUM 4.2 4.4 3.2* 3.4 3.9   CHLORIDE  --   --  105 106 113*   CO2  --   --  25 25 24   BUN  --   --  20 16 25   CR  --   --  0.50* 0.54 0.78   ANIONGAP  --   --  5 4 4   DAVE  --   --  8.9 8.8 8.6   GLC  --   --  107* 85 63*     Most Recent 2 LFT's:  Recent Labs   Lab Test 05/06/20  0928 08/21/19  1718   AST 15 71*   ALT 12 52*   ALKPHOS 113 96   BILITOTAL 1.3 2.2*     Most Recent INR's and Anticoagulation Dosing History:  Anticoagulation Dose History     Recent Dosing and Labs Latest Ref Rng & Units 6/24/2011 6/25/2011 6/26/2011 6/27/2011 6/20/2017 8/28/2019 5/6/2020    INR 0.86 - 1.14 1.53(H) 1.40(H) 1.30(H) 1.21(H) 1.18(H) 1.18(H) 1.13        Most Recent 3 Troponin's:  Recent Labs   Lab Test 07/22/19  1014 07/13/19  1309 05/31/19  1040   TROPI <0.015 <0.015 <0.015     Most Recent Cholesterol Panel:  Recent Labs   Lab Test 02/19/19  1022   CHOL 176   *   HDL 49*   TRIG 95     Most Recent 6 Bacteria Isolates From Any Culture (See EPIC Reports for Culture Details):  Recent Labs   Lab Test 05/09/20  0944 05/09/20  0920  05/08/20  1538 05/08/20  1533 05/06/20  1231 05/06/20  1035   CULT No growth after 3 days No growth after 3 days No growth after 4 days No growth after 4 days Cultured on the 1st day of incubation:  Citrobacter freundii complex  Susceptibility testing done on previous specimen  *  Critical Value/Significant Value, preliminary result only, called to and read back by  Paty Maddox RN at 0439 on 5.7.20 by SS.   >100,000 colonies/mL  Citrobacter freundii complex  *  10,000 to 50,000 colonies/mL  Proteus mirabilis  *  10,000 to 50,000 colonies/mL  Enterococcus faecium  Probable VRE await confirmation  Susceptibility testing in progress  *  Critical Value/Significant Value called to and read back by  MARIRCUZ WAYNE RN 5.12.20 AT 0803. JL       Most Recent TSH, T4 and A1c Labs:  Recent Labs   Lab Test 01/13/17  1020  08/23/13  0958   TSH  --   --  0.91   A1C 5.3   < >  --     < > = values in this interval not displayed.     Results for orders placed or performed during the hospital encounter of 05/06/20   XR Chest Port 1 View    Narrative    CHEST ONE VIEW  5/6/2020 11:13 AM     HISTORY: Malaise, GI symptoms.    COMPARISON: August 21, 2019      Impression    IMPRESSION: No acute disease.    WYATT MOORE MD   CT Chest (PE) Abdomen Pelvis w Contrast    Narrative    CT CHEST PE, ABDOMEN & PELVIS WITH CONTRAST May 6, 2020 12:19 PM    HISTORY: Intractable vomiting, central back and abdominal pain.    TECHNIQUE: CT scan obtained of the chest, abdomen, and pelvis with  oral and IV contrast. 60mL Isovue-370 IV injected. Radiation dose for  this scan was reduced using automated exposure control, adjustment of  the mA and/or kV according to patient size, or iterative  reconstruction technique.    COMPARISON:  Chest x-ray on same day earlier.    FINDINGS:  Chest/mediastinum: No evidence of pulmonary emboli. The main pulmonary  artery is mildly enlarged measuring 3 cm, this can be seen with  pulmonary hypertension. No  pericardial effusion. Enlarged heterogenous  thyroid gland with retrotracheal extension of the right thyroid lobe.  Moderate atherosclerotic vascular calcification of the coronary  arteries and thoracic aorta.    Lung/pleura: No pleural effusion or pneumothorax. Bibasilar pulmonary  opacities, likely atelectasis.    Abdomen/pelvis: No suspicious focal hepatic lesion. Mild periportal  edema. Cholelithiasis without CT evidence of acute cholecystitis. No  splenomegaly. No main pancreatic ductal dilatation or definite solid  pancreatic mass. No adrenal nodules. There is 7 mm stone in the  proximal left ureter with associated mild hydroureter/hydronephrosis.  There is mild mucosal enhancement of the left renal collecting system,  nonspecific, can be seen with underlying infection/inflammation. There  is 7 mm stone in the upper pole of the left kidney (series 9 image  52). No right kidney stones.    No abnormally dilated bowel loops. No significant free fluid in the  abdomen or pelvis. No free peritoneal or portal venous gas. There is  2.9 cm left adnexal cyst (series 9 image 134). Moderate predominantly  aortobiiliac atherosclerotic vascular calcification. Colonic  diverticulosis without CT evidence of acute diverticulitis.    Bones and soft tissue: Postsurgical changes of right hip arthroplasty  and proximal left femoral ORIF diffuse osteopenia with multiple  compression deformities of the thoracolumbar spine including T8, T11,  L1, L2 and L3.      Impression    IMPRESSION:   1. 7 mm stone in the proximal aspect of the left ureter with  associated mild hydroureter/hydronephrosis. In addition, there is mild  mucosal enhancement of the left renal collecting system, nonspecific,  can be seen with underlying urinary tract infection.  2. Cholelithiasis without CT evidence of acute cholecystitis.  3. 2.9 cm left adnexal cyst, likely ovarian, recommend further  evaluation with pelvic ultrasound.  4. Enlarged heterogenous  thyroid parenchyma with mild retrotracheal  extension of the right thyroid lobe.    TAWANNA NUNEZ MD   XR Surgery BORIS L/T 5 Min Fluoro w Stills    Narrative    This exam was marked as non-reportable because it will not be read by a   radiologist or a [facility] non-radiologist provider.         XR KUB    Narrative    ABDOMEN ONE VIEW  5/7/2020 11:13 AM     HISTORY: Kidney stone and stent.    COMPARISON: 5/6/2020      Impression    IMPRESSION: Left-sided double-J ureteral stent in place. There is an  elongated calcific density along the proximal aspect of the left  ureter that measures 1.6 cm in length and corresponds to the calculus  seen on the prior CT. No other definite urinary tract calculi  demonstrated. There is excreted contrast in the urinary bladder. Bowel  gas pattern is nonobstructive. Bilateral hip orthopedic hardware  noted.    TONY HUNT MD     Disclaimer: This note consists of symbols derived from keyboarding, dictation and/or voice recognition software. As a result, there may be errors in the script that have gone undetected. Please consider this when interpreting information found in this chart.

## 2020-05-12 NOTE — DISCHARGE INSTRUCTIONS
Your home care referral was sent to [facility]  If you haven't heard from them within the next 24-48 hours,  Please call them at

## 2020-05-12 NOTE — PLAN OF CARE
Patient hospitalized for hematuria. Cleared for discharge to [facility] today per MD. Discharge instructions, medications, and follow-ups reviewed with patient in detail. Discharge medications, including levaquin, zofran, vanco were filled and sent with patient. Patient verbalized understanding of discharge instructions. Belongings were returned to patient at time of discharge. Assisted transport providing transport home.

## 2020-05-12 NOTE — PROGRESS NOTES
Discharge Planner   Discharge Plans in progress: Tiff from [facility] called. They are out of Network for pt's health plan   Barriers to discharge plan: Called [facility] facility to update a new agency will need to be set up for them. WIll also update pt's son   Follow up plan: PT has used [facility] in the past. Will send        Entered by: Corinne C. White 05/12/2020 4:01 PM

## 2020-05-12 NOTE — PROGRESS NOTES
05/12/20 0802   Significant Event   Significant Event Critical result/value   Urine culture probable VRE. Notified MD.

## 2020-05-12 NOTE — PROGRESS NOTES
[facility]  Infectious Disease Progress Note          Assessment and Plan:   IMPRESSION:   1.  An 89-year-old female with acute sepsis, left ureteral obstruction, gram-negative shon bacteremia with Citrobacter.   2.  Prior history of Clostridium difficile, but not for over a year.  3 sulfa allergy,       RECOMMENDATIONS:   1.  Continue meropenem , unusual R pattern but luckily po options    2.  Urologic intervention already accomplished with obstruction present, does not have major renal insufficiency, so full dose treatment.   3.  Improved progressively, sulfa allergy remote but probably real,  po levaquin logical home plan, to po at disposition to complete 2 weeks total po, ie about 7 days more  Po vanco same tx duration  Ignore VRE             Interval History:   no new complaints and doing well; no cp, sob, n/v/d, or abd pain. T down cx as above               Medications:       amLODIPine  10 mg Oral Daily     carvedilol  25 mg Oral BID w/meals     cloNIDine  0.1 mg Oral BID     levofloxacin  500 mg Oral Daily     lisinopril  20 mg Oral Daily     omeprazole  20 mg Oral Daily     saccharomyces boulardii  250 mg Oral BID     sertraline  100 mg Oral Daily     sodium chloride (PF)  3 mL Intracatheter Q8H     vancomycin  125 mg Oral BID                  Physical Exam:   Blood pressure 116/63, pulse 80, temperature 96.7  F (35.9  C), temperature source Oral, resp. rate 20, weight 59.6 kg (131 lb 8 oz), SpO2 97 %, not currently breastfeeding.  Wt Readings from Last 2 Encounters:   05/07/20 59.6 kg (131 lb 8 oz)   11/20/19 56.5 kg (124 lb 9.6 oz)     Vital Signs with Ranges  Temp:  [96.7  F (35.9  C)-98.5  F (36.9  C)] 96.7  F (35.9  C)  Pulse:  [80] 80  Heart Rate:  [66-77] 68  Resp:  [20] 20  BP: (115-180)/(54-75) 116/63  SpO2:  [93 %-97 %] 97 %    Constitutional: Awake, alert, cooperative, no apparent distress   Lungs: Clear to auscultation bilaterally, no crackles or wheezing   Cardiovascular:  Regular rate and rhythm, normal S1 and S2, and no murmur noted   Abdomen: Normal bowel sounds, soft, non-distended, non-tender   Skin: No rashes, no cyanosis, no edema   Other:           Data:   All microbiology laboratory data reviewed.  Recent Labs   Lab Test 05/11/20  0717 05/10/20  0628 05/09/20  0618   WBC 5.3 10.1 26.6*   HGB 11.6* 11.6* 11.6*   HCT 35.5 35.6 36.1   MCV 96 97 98   PLT 96* 79* 69*     Recent Labs   Lab Test 05/11/20  0717 05/10/20  0628 05/08/20  0621   CR 0.50* 0.54 0.78     No lab results found.  Recent Labs   Lab Test 05/09/20  0944 05/09/20  0920 05/08/20  1538 05/08/20  1533 05/06/20  1231 05/06/20  1035 05/06/20  0948 08/25/19  1421 08/25/19  1409   CULT No growth after 3 days No growth after 3 days No growth after 4 days No growth after 4 days Cultured on the 1st day of incubation:  Citrobacter freundii complex  Susceptibility testing done on previous specimen  *  Critical Value/Significant Value, preliminary result only, called to and read back by  Paty Maddox RN at 0439 on 5.7.20 by .   >100,000 colonies/mL  Citrobacter freundii complex  *  10,000 to 50,000 colonies/mL  Proteus mirabilis  *  10,000 to 50,000 colonies/mL  Enterococcus faecium  Probable VRE await confirmation  Susceptibility testing in progress  *  Critical Value/Significant Value called to and read back by  MARICRUZ WAYNE RN 5.12.20 AT 0803. JL   Cultured on the 1st day of incubation:  Citrobacter freundii complex  *  Critical Value/Significant Value, preliminary result only, called to and read back by  Roxanna Small RN at 0045 on 5.7.20 by SS.    (Note)  POSITIVE for CITROBACTER SPECIES by [employer] multiplex nucleic acid  test. Final identification and antimicrobial susceptibility testing  will be verified by standard methods.    Specimen tested with Verigene multiplex, gram-negative blood culture  nucleic acid test for the following targets: Acinetobacter sp.,  Citrobacter sp., Enterobacter sp., Proteus  sp., E. coli, K.  pneumoniae/oxytoca, P. aeruginosa, and the following resistance  markers: CTXM, KPC, NDM, VIM, IMP and OXA.    Critical Value/Significant Value called to and read back by Delmy Yañez RN in RMS5 at 0320 5.7.20. AMD   No growth No growth

## 2020-05-12 NOTE — PROGRESS NOTES
Discharge Planner   Discharge Plans in progress: Spoke with Gael. He is aware that pt will return home today.. Transport is set up for 1445  Barriers to discharge plan: none  Follow up plan:      05/11/20 1100   Final Resources   Assisted Living   ([facility]  [phone] fax [phone])   Home Care   ([facility] [phone] fax [phone])        Corinne White [facility]  Inpatient Care Coordination   [phone]  [facility]     Entered by: Corinne C. White 05/12/2020 1:28 PM

## 2020-05-12 NOTE — PLAN OF CARE
To Do:  End of Shift Summary  For vital signs and complete assessments, please see documentation flowsheets.     Pertinent assessments: Patient was Aox4, forgetful at times. Patient c/o bilateral lower leg pain which was effectively being managed with tylenol. Heating pad for management of lower back pain. Denies nausea or SOB. Voiding without any difficulties.     Major Shift Events: Uneventful     Treatment Plan: Continue with PO Vanco and IV Merrem    Discharge Readiness: Medically active  Expected Discharge Date: TBA  Discharge Disposition: Assisted living Facility  Barriers/Criteria for discharge: Abx regimen

## 2020-05-13 LAB
BACTERIA SPEC CULT: ABNORMAL
Lab: ABNORMAL
SPECIMEN SOURCE: ABNORMAL

## 2020-05-15 LAB
BACTERIA SPEC CULT: NO GROWTH
BACTERIA SPEC CULT: NO GROWTH
SPECIMEN SOURCE: NORMAL
SPECIMEN SOURCE: NORMAL

## 2020-05-27 ENCOUNTER — ANESTHESIA EVENT (OUTPATIENT)
Dept: SURGERY | Facility: CLINIC | Age: 85
End: 2020-05-27
Payer: COMMERCIAL

## 2020-05-28 ENCOUNTER — HOSPITAL ENCOUNTER (OUTPATIENT)
Facility: CLINIC | Age: 85
Discharge: HOME | End: 2020-05-28
Attending: UROLOGY | Admitting: UROLOGY
Payer: COMMERCIAL

## 2020-05-28 ENCOUNTER — ANESTHESIA (OUTPATIENT)
Dept: SURGERY | Facility: CLINIC | Age: 85
End: 2020-05-28
Payer: COMMERCIAL

## 2020-05-28 VITALS
RESPIRATION RATE: 14 BRPM | OXYGEN SATURATION: 100 % | BODY MASS INDEX: 23.87 KG/M2 | TEMPERATURE: 97.6 F | SYSTOLIC BLOOD PRESSURE: 142 MMHG | WEIGHT: 121.6 LBS | HEIGHT: 60 IN | DIASTOLIC BLOOD PRESSURE: 61 MMHG | HEART RATE: 55 BPM

## 2020-05-28 DIAGNOSIS — N20.0 KIDNEY STONE: ICD-10-CM

## 2020-05-28 DIAGNOSIS — N20.0 KIDNEY STONE: Primary | ICD-10-CM

## 2020-05-28 PROCEDURE — 71000013 ZZH RECOVERY PHASE 1 LEVEL 1 EA ADDTL HR: Performed by: UROLOGY

## 2020-05-28 PROCEDURE — 25000128 H RX IP 250 OP 636: Performed by: UROLOGY

## 2020-05-28 PROCEDURE — 25000125 ZZHC RX 250: Performed by: NURSE ANESTHETIST, CERTIFIED REGISTERED

## 2020-05-28 PROCEDURE — 36000063 ZZH SURGERY LEVEL 4 EA 15 ADDTL MIN: Performed by: UROLOGY

## 2020-05-28 PROCEDURE — 36000065 ZZH SURGERY LEVEL 4 W FLUORO 1ST 30 MIN: Performed by: UROLOGY

## 2020-05-28 PROCEDURE — 37000009 ZZH ANESTHESIA TECHNICAL FEE, EACH ADDTL 15 MIN: Performed by: UROLOGY

## 2020-05-28 PROCEDURE — 25000128 H RX IP 250 OP 636: Performed by: ANESTHESIOLOGY

## 2020-05-28 PROCEDURE — 40000170 ZZH STATISTIC PRE-PROCEDURE ASSESSMENT II: Performed by: UROLOGY

## 2020-05-28 PROCEDURE — 50590 FRAGMENTING OF KIDNEY STONE: CPT | Mod: LT | Performed by: UROLOGY

## 2020-05-28 PROCEDURE — 40000648 ZZH STATISTIC LITHOTRIPSY IDENTIFIER: Performed by: UROLOGY

## 2020-05-28 PROCEDURE — 37000008 ZZH ANESTHESIA TECHNICAL FEE, 1ST 30 MIN: Performed by: UROLOGY

## 2020-05-28 PROCEDURE — 71000012 ZZH RECOVERY PHASE 1 LEVEL 1 FIRST HR: Performed by: UROLOGY

## 2020-05-28 PROCEDURE — 25800030 ZZH RX IP 258 OP 636: Performed by: NURSE ANESTHETIST, CERTIFIED REGISTERED

## 2020-05-28 PROCEDURE — 25000128 H RX IP 250 OP 636: Performed by: NURSE ANESTHETIST, CERTIFIED REGISTERED

## 2020-05-28 PROCEDURE — 25000566 ZZH SEVOFLURANE, EA 15 MIN: Performed by: UROLOGY

## 2020-05-28 RX ORDER — SODIUM CHLORIDE, SODIUM LACTATE, POTASSIUM CHLORIDE, CALCIUM CHLORIDE 600; 310; 30; 20 MG/100ML; MG/100ML; MG/100ML; MG/100ML
INJECTION, SOLUTION INTRAVENOUS CONTINUOUS PRN
Status: DISCONTINUED | OUTPATIENT
Start: 2020-05-28 | End: 2020-05-28

## 2020-05-28 RX ORDER — FENTANYL CITRATE 50 UG/ML
50 INJECTION, SOLUTION INTRAMUSCULAR; INTRAVENOUS
Status: DISCONTINUED | OUTPATIENT
Start: 2020-05-28 | End: 2020-05-28 | Stop reason: HOSPADM

## 2020-05-28 RX ORDER — CEFAZOLIN SODIUM 2 G/100ML
2 INJECTION, SOLUTION INTRAVENOUS
Status: COMPLETED | OUTPATIENT
Start: 2020-05-28 | End: 2020-05-28

## 2020-05-28 RX ORDER — LIDOCAINE HYDROCHLORIDE 20 MG/ML
INJECTION, SOLUTION INFILTRATION; PERINEURAL PRN
Status: DISCONTINUED | OUTPATIENT
Start: 2020-05-28 | End: 2020-05-28

## 2020-05-28 RX ORDER — DEXAMETHASONE SODIUM PHOSPHATE 4 MG/ML
INJECTION, SOLUTION INTRA-ARTICULAR; INTRALESIONAL; INTRAMUSCULAR; INTRAVENOUS; SOFT TISSUE PRN
Status: DISCONTINUED | OUTPATIENT
Start: 2020-05-28 | End: 2020-05-28

## 2020-05-28 RX ORDER — FENTANYL CITRATE 50 UG/ML
25-50 INJECTION, SOLUTION INTRAMUSCULAR; INTRAVENOUS EVERY 5 MIN PRN
Status: DISCONTINUED | OUTPATIENT
Start: 2020-05-28 | End: 2020-05-28 | Stop reason: HOSPADM

## 2020-05-28 RX ORDER — ONDANSETRON 4 MG/1
4 TABLET, ORALLY DISINTEGRATING ORAL EVERY 30 MIN PRN
Status: DISCONTINUED | OUTPATIENT
Start: 2020-05-28 | End: 2020-05-28 | Stop reason: HOSPADM

## 2020-05-28 RX ORDER — NEOSTIGMINE METHYLSULFATE 1 MG/ML
VIAL (ML) INJECTION PRN
Status: DISCONTINUED | OUTPATIENT
Start: 2020-05-28 | End: 2020-05-28

## 2020-05-28 RX ORDER — PROPOFOL 10 MG/ML
INJECTION, EMULSION INTRAVENOUS PRN
Status: DISCONTINUED | OUTPATIENT
Start: 2020-05-28 | End: 2020-05-28

## 2020-05-28 RX ORDER — ONDANSETRON 2 MG/ML
INJECTION INTRAMUSCULAR; INTRAVENOUS PRN
Status: DISCONTINUED | OUTPATIENT
Start: 2020-05-28 | End: 2020-05-28

## 2020-05-28 RX ORDER — EPHEDRINE SULFATE 50 MG/ML
INJECTION, SOLUTION INTRAMUSCULAR; INTRAVENOUS; SUBCUTANEOUS PRN
Status: DISCONTINUED | OUTPATIENT
Start: 2020-05-28 | End: 2020-05-28

## 2020-05-28 RX ORDER — FENTANYL CITRATE 50 UG/ML
25-50 INJECTION, SOLUTION INTRAMUSCULAR; INTRAVENOUS
Status: DISCONTINUED | OUTPATIENT
Start: 2020-05-28 | End: 2020-05-28 | Stop reason: HOSPADM

## 2020-05-28 RX ORDER — SODIUM CHLORIDE, SODIUM LACTATE, POTASSIUM CHLORIDE, CALCIUM CHLORIDE 600; 310; 30; 20 MG/100ML; MG/100ML; MG/100ML; MG/100ML
INJECTION, SOLUTION INTRAVENOUS CONTINUOUS
Status: DISCONTINUED | OUTPATIENT
Start: 2020-05-28 | End: 2020-05-28 | Stop reason: HOSPADM

## 2020-05-28 RX ORDER — CEFAZOLIN SODIUM 1 G/3ML
1 INJECTION, POWDER, FOR SOLUTION INTRAMUSCULAR; INTRAVENOUS SEE ADMIN INSTRUCTIONS
Status: DISCONTINUED | OUTPATIENT
Start: 2020-05-28 | End: 2020-05-28 | Stop reason: HOSPADM

## 2020-05-28 RX ORDER — ONDANSETRON 2 MG/ML
4 INJECTION INTRAMUSCULAR; INTRAVENOUS EVERY 30 MIN PRN
Status: DISCONTINUED | OUTPATIENT
Start: 2020-05-28 | End: 2020-05-28 | Stop reason: HOSPADM

## 2020-05-28 RX ORDER — NALOXONE HYDROCHLORIDE 0.4 MG/ML
.1-.4 INJECTION, SOLUTION INTRAMUSCULAR; INTRAVENOUS; SUBCUTANEOUS
Status: DISCONTINUED | OUTPATIENT
Start: 2020-05-28 | End: 2020-05-28 | Stop reason: HOSPADM

## 2020-05-28 RX ORDER — GLYCOPYRROLATE 0.2 MG/ML
INJECTION, SOLUTION INTRAMUSCULAR; INTRAVENOUS PRN
Status: DISCONTINUED | OUTPATIENT
Start: 2020-05-28 | End: 2020-05-28

## 2020-05-28 RX ADMIN — PROPOFOL 100 MG: 10 INJECTION, EMULSION INTRAVENOUS at 12:47

## 2020-05-28 RX ADMIN — CEFAZOLIN SODIUM 2 G: 2 INJECTION, SOLUTION INTRAVENOUS at 12:49

## 2020-05-28 RX ADMIN — FENTANYL CITRATE 100 MCG: 50 INJECTION, SOLUTION INTRAMUSCULAR; INTRAVENOUS at 12:47

## 2020-05-28 RX ADMIN — GLYCOPYRROLATE 0.4 MG: 0.2 INJECTION, SOLUTION INTRAMUSCULAR; INTRAVENOUS at 14:02

## 2020-05-28 RX ADMIN — SODIUM CHLORIDE, POTASSIUM CHLORIDE, SODIUM LACTATE AND CALCIUM CHLORIDE: 600; 310; 30; 20 INJECTION, SOLUTION INTRAVENOUS at 12:41

## 2020-05-28 RX ADMIN — Medication 5 MG: at 13:00

## 2020-05-28 RX ADMIN — LIDOCAINE HYDROCHLORIDE 60 MG: 20 INJECTION, SOLUTION INFILTRATION; PERINEURAL at 12:47

## 2020-05-28 RX ADMIN — DEXAMETHASONE SODIUM PHOSPHATE 4 MG: 4 INJECTION, SOLUTION INTRA-ARTICULAR; INTRALESIONAL; INTRAMUSCULAR; INTRAVENOUS; SOFT TISSUE at 12:54

## 2020-05-28 RX ADMIN — ONDANSETRON 4 MG: 2 INJECTION INTRAMUSCULAR; INTRAVENOUS at 12:54

## 2020-05-28 RX ADMIN — PHENYLEPHRINE HYDROCHLORIDE 50 MCG: 10 INJECTION INTRAVENOUS at 12:54

## 2020-05-28 RX ADMIN — Medication 5 MG: at 12:56

## 2020-05-28 RX ADMIN — Medication 5 MG: at 12:58

## 2020-05-28 RX ADMIN — PHENYLEPHRINE HYDROCHLORIDE 50 MCG: 10 INJECTION INTRAVENOUS at 12:51

## 2020-05-28 RX ADMIN — NEOSTIGMINE METHYLSULFATE 2 MG: 1 INJECTION, SOLUTION INTRAVENOUS at 14:02

## 2020-05-28 RX ADMIN — PHENYLEPHRINE HYDROCHLORIDE 50 MCG: 10 INJECTION INTRAVENOUS at 13:01

## 2020-05-28 RX ADMIN — Medication 5 MG: at 12:51

## 2020-05-28 RX ADMIN — PHENYLEPHRINE HYDROCHLORIDE 50 MCG: 10 INJECTION INTRAVENOUS at 12:57

## 2020-05-28 RX ADMIN — Medication 5 MG: at 12:53

## 2020-05-28 RX ADMIN — ROCURONIUM BROMIDE 20 MG: 10 INJECTION INTRAVENOUS at 12:47

## 2020-05-28 ASSESSMENT — MIFFLIN-ST. JEOR: SCORE: 898.07

## 2020-05-28 NOTE — ANESTHESIA PREPROCEDURE EVALUATION
Anesthesia Pre-Procedure Evaluation    Patient: Daya Ignacio   MRN: [id number] : 10/11/1930          Preoperative Diagnosis: Kidney stone [N20.0]    Procedure(s):  LEFT EXTRACORPOREAL SHOCK WAVE LITHOTRIPSY    Past Medical History:   Diagnosis Date     Anemia      Anxiety      Arthritis      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 2014     Depression      h/o Clostridium difficile colitis 2019     History of blood transfusion      Hypertension      Kidney stone      Lumbar compression fracture (H)      Macular degeneration (senile) of retina, unspecified      Mild cognitive impairment      Other chronic pain     low back     Recurrent UTI      Upper GI bleed 2019     Urinary tract infection due to extended-spectrum beta lactamase (ESBL)-producing Klebsiella 2019    resistant to Bactrim     Past Surgical History:   Procedure Laterality Date     Cataract removal NOS Bilateral      COMBINED CYSTOSCOPY, INSERT STENT URETER(S) Left 2020    Procedure: Cystoscopy with left ureteral stent insertion;  Surgeon: Gaurav Munson MD;  Location:  OR     CYSTOSCOPY N/A 2019    Procedure: Exam under anesthesia, video cystopanendoscopy;  Surgeon: Dennis Carlson MD;  Location:  OR     ESOPHAGOSCOPY, GASTROSCOPY, DUODENOSCOPY (EGD), COMBINED N/A 2019    Procedure: ESOPHAGOGASTRODUODENOSCOPY (EGD);  Surgeon: Gaurav Degroot MD;  Location:  GI     Hip Pinning procedure Left      Hip replacement NOS Right      OPEN REDUCTION INTERNAL FIXATION TIBIAL PLATEAU Right 2019    Procedure: Open reduction internal fixation right proximal tibia fracture;  Surgeon: Molina Pardo MD;  Location:  OR     TONSILLECTOMY, ADENOIDECTOMY, COMBINED         Anesthesia Evaluation     . Pt has had prior anesthetic.     No history of anesthetic complications          ROS/MED HX    ENT/Pulmonary:  - neg pulmonary ROS     Neurologic: Comment: Mild cognitive impairment.      Cardiovascular:  Comment: Denies cardiac symptoms  Interpretation Summary     No regional wall motion abnormalities noted.  Left ventricular systolic function is normal.  The visual ejection fraction is estimated at 60-65%.  The left ventricle is normal in size.  The left atrium is mildly dilated.  There is mild (1+) mitral regurgitation.  Right ventricular systolic pressure is elevated, consistent with mild  pulmonary hypertension.  There is trace to mild aortic regurgitation.     As compared with the last study 5/29/2017, a posterolateral regional wall  motion abnormality is no longer seen. Estimated PA systolic pressures are  lower ( were estimated 46 to 55 mmHg, now 33 mmHg + RAP).    (+) hypertension----. : . . . :. .       METS/Exercise Tolerance:     Hematologic:  - neg hematologic  ROS   (+) Anemia, -      Musculoskeletal:  - neg musculoskeletal ROS       GI/Hepatic: Comment: H/o upper GI bleed.        Renal/Genitourinary: Comment: H/o kidney stone. Recurrent UTI.        Endo:  - neg endo ROS       Psychiatric:  - neg psychiatric ROS       Infectious Disease:  - neg infectious disease ROS       Malignancy:         Other:                          Physical Exam  Normal systems: cardiovascular and pulmonary    Airway   Mallampati: II    Dental   (+) lower dentures    Cardiovascular   Rhythm and rate: regular and normal      Pulmonary    breath sounds clear to auscultation            Lab Results   Component Value Date    WBC 5.3 05/11/2020    HGB 11.6 (L) 05/11/2020    HCT 35.5 05/11/2020    PLT 96 (L) 05/11/2020    CRP 21.5 (H) 05/11/2020    SED 10 07/09/2007     05/11/2020    POTASSIUM 4.2 05/12/2020    CHLORIDE 105 05/11/2020    CO2 25 05/11/2020    BUN 20 05/11/2020    CR 0.50 (L) 05/11/2020     (H) 05/11/2020    DAVE 8.9 05/11/2020    PHOS 5.5 (H) 07/15/2019    MAG 1.7 08/29/2019    ALBUMIN 3.7 05/06/2020    PROTTOTAL 7.3 05/06/2020    ALT 12 05/06/2020    AST 15 05/06/2020    ALKPHOS 113 05/06/2020    BILITOTAL  1.3 05/06/2020    LIPASE 42 (L) 05/06/2020    AMYLASE 361 (H) 06/25/2011    PTT 29 06/23/2011    INR 1.13 05/06/2020    FIBR 177 (L) 06/23/2011    TSH 0.91 08/23/2013       Preop Vitals  BP Readings from Last 3 Encounters:   05/28/20 135/56   05/12/20 136/57   11/20/19 132/71    Pulse Readings from Last 3 Encounters:   05/28/20 52   05/11/20 80   11/20/19 62      Resp Readings from Last 3 Encounters:   05/28/20 12   05/12/20 20   11/20/19 18    SpO2 Readings from Last 3 Encounters:   05/28/20 99%   05/12/20 95%   11/20/19 94%      Temp Readings from Last 1 Encounters:   05/28/20 35.9  C (96.7  F) (Temporal)    Ht Readings from Last 1 Encounters:   05/28/20 1.524 m (5')      Wt Readings from Last 1 Encounters:   05/28/20 55.2 kg (121 lb 9.6 oz)    Estimated body mass index is 23.75 kg/m  as calculated from the following:    Height as of this encounter: 1.524 m (5').    Weight as of this encounter: 55.2 kg (121 lb 9.6 oz).       Anesthesia Plan      History & Physical Review  History and physical reviewed and following examination; no interval change.    ASA Status:  2 .    NPO Status:  > 8 hours    Plan for General (ETT.) with Intravenous induction. Maintenance will be Inhalation.    PONV prophylaxis:  Ondansetron (or other 5HT-3)         Postoperative Care  Postoperative pain management:  IV analgesics and Multi-modal analgesia.      Consents  Anesthetic plan, risks, benefits and alternatives discussed with:  Patient..                 Dandy Hurtado MD

## 2020-05-28 NOTE — OR NURSING
Discharge instructions phoned to Shannon(nurse) at [facility]. Printed form sent along with patient.

## 2020-05-28 NOTE — ANESTHESIA POSTPROCEDURE EVALUATION
Patient: Caitlinle Freedman    Procedure(s):  LEFT EXTRACORPOREAL SHOCK WAVE LITHOTRIPSY    Diagnosis:Kidney stone [N20.0]  Diagnosis Additional Information: No value filed.    Anesthesia Type:  No value filed.    Note:  Anesthesia Post Evaluation    Patient location during evaluation: PACU  Patient participation: Able to fully participate in evaluation  Level of consciousness: awake  Pain management: adequate  Airway patency: patent  Cardiovascular status: hemodynamically stable  Respiratory status: acceptable, unassisted and room air  Hydration status: acceptable  PONV: none             Last vitals:  Vitals:    05/28/20 1500 05/28/20 1515 05/28/20 1530   BP: (!) 140/63 135/62 (!) 142/61   Pulse: 58 58 55   Resp: 13 12 14   Temp:  36.4  C (97.6  F)    SpO2: 91% 90% 100%         Electronically Signed By: Dandy Hurtado MD  May 28, 2020  3:52 PM

## 2020-05-28 NOTE — DISCHARGE INSTRUCTIONS
Same Day Surgery Discharge Instructions for  Sedation and General Anesthesia       It's not unusual to feel dizzy, light-headed or faint for up to 24 hours after surgery or while taking pain medication.  If you have these symptoms: sit for a few minutes before standing and have someone assist you when you get up to walk or use the bathroom.      You should rest and relax for the next 24 hours. We recommend you make arrangements to have an adult stay with you for at least 24 hours after your discharge.  Avoid hazardous and strenuous activity.      DO NOT DRIVE any vehicle or operate mechanical equipment for 24 hours following the end of your surgery.  Even though you may feel normal, your reactions may be affected by the medication you have received.      Do not drink alcoholic beverages for 24 hours following surgery.       Slowly progress to your regular diet as you feel able. It's not unusual to feel nauseated and/or vomit after receiving anesthesia.  If you develop these symptoms, drink clear liquids (apple juice, ginger ale, broth, 7-up, etc. ) until you feel better.  If your nausea and vomiting persists for 24 hours, please notify your surgeon.        All narcotic pain medications, along with inactivity and anesthesia, can cause constipation. Drinking plenty of liquids and increasing fiber intake will help.      For any questions of a medical nature, call your surgeon.      Do not make important decisions for 24 hours.      If you had general anesthesia, you may have a sore throat for a couple of days related to the breathing tube used during surgery.  You may use Cepacol lozenges to help with this discomfort.  If it worsens or if you develop a fever, contact your surgeon.       If you feel your pain is not well managed with the pain medications prescribed by your surgeon, please contact your surgeon's office to let them know so they can address your concerns.       CoVid 19 Information    We want to give you  information regarding Covid. Please consult your primary care provider with any questions you might have.     Patient who have symptoms (cough, fever, or shortness of breath), need to isolate for 7 days from when symptoms started OR 72 hours after fever resolves (without fever reducing medications) AND improvement of respiratory symptoms (whichever is longer).      Isolate yourself at home (in own room/own bathroom if possible)    Do Not allow any visitors    Do Not go to work or school    Do Not go to Jewish,  centers, shopping, or other public places.    Do Not shake hands.    Avoid close and intimate contact with others (hugging, kissing).    Follow CDC recommendations for household cleaning of frequently touched services.     After the initial 7 days, continue to isolate yourself from household members as much as possible. To continue decrease the risk of community spread and exposure, you and any members of your household should limit activities in public for 14 days after starting home isolation.     You can reference the following CDC link for helpful home isolation/care tips:  https://www.cdc.gov/coronavirus/2019-ncov/downloads/10Things.pdf    Protect Others:    Cover Your Mouth and Nose with a mask, disposable tissue or wash cloth to avoid spreading germs to others.    Wash your hands and face frequently with soap and water    Call Your Primary Doctor If: Breathing difficulty develops or you become worse.    For more information about COVID19 and options for caring for yourself at home, please visit the CDC website at https://www.cdc.gov/coronavirus/2019-ncov/about/steps-when-sick.html  For more options for care at [facility], please visit our website at [email or web address]    DISCHARGE INSTRUCTIONS FOLLOWING EXTRACORPOREAL SHOCK LITHOTRIPSY (ESWL)      Your stone(s) has been fragmented into many tiny pieces, which must now pass in your urine.  Usually this  process is uneventful.  Most fragments pass in the first one or two week, but some may continue to pass for three months or more.  Some pain or discomfort may accompany the passage of these fragments.    To aid in the passage of fragments, drink lots of fluid.  Aim for 1 glass an hour for the next 1 to 2 weeks (2 quarts or more a day).  Most stone patients will benefit from a continued high fluid intake and urine output indefinitely, even after the fragments are gone.  This helps prevent new stone formation.    Strain your urine.  Take the stone fragments to your urologist.  They will have them analyzed to help determine the cause of your stone(s).    You should walk around and resume every day activities.  Activity may help the stone fragments pass.  You should, however, avoid sports or really strenuous exercise for about a week, or at least until there is no more blood in your urine.    You may resume regular diet.    Call your urologist if you have:  a. Persistent severe pain not relieved by oral medications.  b. Fever (over 101 ).  c. Persistent vomiting.    See your urologist as directed.  They will need to take an x-ray to check your progress.  Take your stone fragments to your follow-up appointment.

## 2020-05-28 NOTE — ANESTHESIA CARE TRANSFER NOTE
Patient: Caitlinle Freedman    Procedure(s):  LEFT EXTRACORPOREAL SHOCK WAVE LITHOTRIPSY    Diagnosis: Kidney stone [N20.0]  Diagnosis Additional Information: No value filed.    Anesthesia Type:   No value filed.     Note:  Airway :Face Mask  Patient transferred to:PACU  Handoff Report: Identifed the Patient, Identified the Reponsible Provider, Reviewed the pertinent medical history, Discussed the surgical course, Reviewed Intra-OP anesthesia mangement and issues during anesthesia, Set expectations for post-procedure period and Allowed opportunity for questions and acknowledgement of understanding      Vitals: (Last set prior to Anesthesia Care Transfer)    CRNA VITALS  5/28/2020 1354 - 5/28/2020 1431      5/28/2020             NIBP:  145/70    NIBP Mean:  93                Electronically Signed By: KARTHIK Dickens CRNA  May 28, 2020  2:31 PM

## 2020-05-28 NOTE — OP NOTE
Procedure Date: 2020      SURGEON:  Gaurav Munson MD      PREOPERATIVE DIAGNOSIS:  Left proximal ureteral stone.      POSTOPERATIVE DIAGNOSIS: Left proximal ureteral stone.      PROCEDURE PERFORMED:  Left extracorporal shock wave lithotripsy.      ANESTHESIA:  General.      COMPLICATIONS:  None.      INDICATIONS FOR PROCEDURE:  Magaly Mcdaniels is an 89-year-old woman with a proximal left ureteral stone.  She previously had a stent placed.  She has a urinary tract infection and now presents for shock wave lithotripsy for stone.      DETAILS OF THE PROCEDURE:  The risks and benefits of the procedure were explained in detail to the patient and informed consent was obtained.  The patient was brought to the operating room, placed supine on the operating table where she underwent general endotracheal anesthetic.  She was then moved down over the lithotripter and fluoroscopy was used to identify her stone.  I then delivered 3000 shocks at a maximum power level of 6 to the stone.  The stone appeared to fragment completely.  The patient was woken up and the procedure was concluded.      The patient tolerated the procedure without complications.  She went the post-anesthetic care in good condition.  She will go home from there.  I will see her back in 2 weeks for KUB and stent removal in the office.         GAURAV MUNSON MD             D: 2020   T: 2020   MT: PK      Name:     MAGALY MCDANIELS   MRN:      [id number]        Account:        [id number]   :      10/11/1930           Procedure Date: 2020      Document: [id number]

## 2020-06-10 ENCOUNTER — TELEPHONE (OUTPATIENT)
Dept: UROLOGY | Facility: CLINIC | Age: 85
End: 2020-06-10

## 2020-06-10 DIAGNOSIS — N20.0 KIDNEY STONE: Primary | ICD-10-CM

## 2020-07-13 ENCOUNTER — RECORDS - HEALTHEAST (OUTPATIENT)
Dept: LAB | Facility: CLINIC | Age: 85
End: 2020-07-13

## 2020-07-14 LAB
ANION GAP SERPL CALCULATED.3IONS-SCNC: 5 MMOL/L (ref 5–18)
BUN SERPL-MCNC: 14 MG/DL (ref 8–28)
CALCIUM SERPL-MCNC: 9.4 MG/DL (ref 8.5–10.5)
CHLORIDE BLD-SCNC: 104 MMOL/L (ref 98–107)
CO2 SERPL-SCNC: 28 MMOL/L (ref 22–31)
CREAT SERPL-MCNC: 0.71 MG/DL (ref 0.6–1.1)
ERYTHROCYTE [DISTWIDTH] IN BLOOD BY AUTOMATED COUNT: 14.3 % (ref 11–14.5)
GFR SERPL CREATININE-BSD FRML MDRD: >60 ML/MIN/1.73M2
GLUCOSE BLD-MCNC: 93 MG/DL (ref 70–125)
HCT VFR BLD AUTO: 32.4 % (ref 35–47)
HGB BLD-MCNC: 10.3 G/DL (ref 12–16)
MCH RBC QN AUTO: 31.4 PG (ref 27–34)
MCHC RBC AUTO-ENTMCNC: 31.8 G/DL (ref 32–36)
MCV RBC AUTO: 99 FL (ref 80–100)
PLATELET # BLD AUTO: 216 THOU/UL (ref 140–440)
PMV BLD AUTO: 10.8 FL (ref 8.5–12.5)
POTASSIUM BLD-SCNC: 3.9 MMOL/L (ref 3.5–5)
RBC # BLD AUTO: 3.28 MILL/UL (ref 3.8–5.4)
SODIUM SERPL-SCNC: 137 MMOL/L (ref 136–145)
WBC: 5.3 THOU/UL (ref 4–11)

## 2020-09-25 ENCOUNTER — TELEPHONE (OUTPATIENT)
Dept: UROLOGY | Facility: CLINIC | Age: 85
End: 2020-09-25

## 2020-09-25 NOTE — TELEPHONE ENCOUNTER
[facility] Call Center    Phone Message    May a detailed message be left on voicemail: yes     Reason for Call: Other: pts son angel is calling wondering what kind of appt mirian needs, she had a canceled cysto in june, she is currently having back pain and possibly another kidney stone, please call angel with some kind of direction thanks!     Action Taken: Message routed to:  [facility] ([facility] uro    Travel Screening: Not Applicable

## 2020-09-25 NOTE — TELEPHONE ENCOUNTER
Returned phone call and patient's son reiterated information below. Will send message to MD. Lilibeth Evans LPN

## 2020-09-28 NOTE — TELEPHONE ENCOUNTER
"Per MD-\"Does she still have her stent in place???   It looks like it was never removed, not by us at least   Please call her   Assuming her stent is still in place, she needs to see me for KUB same day and stent out.\"    Patient was called it looks like by scheduling and a stent-out appointment has been made.     Lilibeth Evans, YASIR    "

## 2020-09-30 ENCOUNTER — HOSPITAL ENCOUNTER (OUTPATIENT)
Dept: GENERAL RADIOLOGY | Facility: CLINIC | Age: 85
Discharge: HOME | End: 2020-09-30
Attending: UROLOGY | Admitting: UROLOGY
Payer: COMMERCIAL

## 2020-09-30 ENCOUNTER — OFFICE VISIT (OUTPATIENT)
Dept: UROLOGY | Facility: CLINIC | Age: 85
End: 2020-09-30
Payer: COMMERCIAL

## 2020-09-30 VITALS
DIASTOLIC BLOOD PRESSURE: 62 MMHG | HEART RATE: 60 BPM | SYSTOLIC BLOOD PRESSURE: 120 MMHG | BODY MASS INDEX: 24.35 KG/M2 | HEIGHT: 60 IN | WEIGHT: 124 LBS

## 2020-09-30 DIAGNOSIS — N20.0 KIDNEY STONE: ICD-10-CM

## 2020-09-30 DIAGNOSIS — Z11.59 ENCOUNTER FOR SCREENING FOR OTHER VIRAL DISEASES: Primary | ICD-10-CM

## 2020-09-30 DIAGNOSIS — N20.1 URETERAL STONE: Primary | ICD-10-CM

## 2020-09-30 PROCEDURE — 74019 RADEX ABDOMEN 2 VIEWS: CPT

## 2020-09-30 PROCEDURE — 99214 OFFICE O/P EST MOD 30 MIN: CPT | Performed by: UROLOGY

## 2020-09-30 RX ORDER — CEFAZOLIN SODIUM 2 G/50ML
2 SOLUTION INTRAVENOUS
Status: CANCELLED | OUTPATIENT
Start: 2020-09-30

## 2020-09-30 RX ORDER — CEFAZOLIN SODIUM 1 G/50ML
1 INJECTION, SOLUTION INTRAVENOUS SEE ADMIN INSTRUCTIONS
Status: CANCELLED | OUTPATIENT
Start: 2020-09-30

## 2020-09-30 ASSESSMENT — PAIN SCALES - GENERAL: PAINLEVEL: MODERATE PAIN (5)

## 2020-09-30 ASSESSMENT — MIFFLIN-ST. JEOR: SCORE: 908.96

## 2020-09-30 NOTE — LETTER
9/30/2020       RE: Daya Ignacio  [address] [facility] [address]     Dear Colleague,    Thank you for referring your patient, Daya Ignacio, to the [facility] [place] at [facility]. Please see a copy of my visit note below.    Office Visit Note  [facility]  [phone]    Sep 30, 2020    [unfilled]    10/11/1930    UROLOGIC DIAGNOSES:    Left ureteral stone    CURRENT INTERVENTIONS:    S/P stent and ESWL    History:    Daya had a left ureteral stent placed on day 6 and then underwent shockwave lithotripsy on May 28.  She missed her follow-up appointment 2 weeks later and is now here with her son for her delayed follow-up appointment.  She has felt well since her procedure.  She had a KUB today that shows a stent in good position but shows fragments along the distal portion of the stent.      Imaging: I reviewed her KUB images from today.  Stent in good position.  No stones in the upper ureter or kidney but larger stone or multiple fragments in the distal left ureter.    Labs:      MEDICATIONS:    Current Outpatient Medications:      acetaminophen (TYLENOL) 500 MG tablet, Take 500 mg by mouth 3 times daily, Disp: , Rfl:      acetaminophen (TYLENOL) 500 MG tablet, Take 500 mg by mouth 3 times daily as needed for mild pain, Disp: , Rfl:      amLODIPine (NORVASC) 10 MG tablet, Take 10 mg by mouth daily, Disp: , Rfl:      Calcium Carbonate (CALCIUM 600 PO), Take 1 tablet by mouth 2 times daily , Disp: , Rfl:      carvedilol (COREG) 25 MG tablet, Take 25 mg by mouth At Bedtime, Disp: , Rfl:      carvedilol (COREG) 25 MG tablet, Take 12.5 mg by mouth every morning , Disp: , Rfl:      cephALEXin (KEFLEX) 250 MG capsule, Take 250 mg by mouth daily, Disp: , Rfl:      Cholecalciferol (VITAMIN D3) 2000 UNITS CAPS, Take 2,000 Units by mouth daily (with dinner), Disp: , Rfl:      cloNIDine (CATAPRES) 0.1 MG tablet, Take 0.1 mg  by mouth 2 times daily, Disp: , Rfl:      diclofenac (VOLTAREN) 1 % topical gel, Apply 2 g topically 3 times daily Apply to right knee, Disp: , Rfl:      docusate sodium (COLACE) 100 MG capsule, Take 1 capsule (100 mg) by mouth At Bedtime, Disp: , Rfl:      gabapentin (NEURONTIN) 100 MG capsule, Take 2 capsules (200 mg) by mouth 2 times daily, Disp: 120 capsule, Rfl: 0     isosorbide dinitrate (ISORDIL) 10 MG tablet, Take 1 tablet (10 mg) by mouth 3 times daily, Disp: 270 tablet, Rfl: 3     lisinopril (ZESTRIL) 30 MG tablet, Take 30 mg by mouth daily, Disp: , Rfl:      Multiple Vitamins-Minerals (PRESERVISION AREDS) CAPS, Take 1 capsule by mouth 2 times daily , Disp: , Rfl:      omeprazole 20 MG tablet, Take 20 mg by mouth daily , Disp: , Rfl:      ondansetron (ZOFRAN-ODT) 4 MG ODT tab, Take 1 tablet (4 mg) by mouth every 6 hours as needed for nausea or vomiting, Disp: , Rfl:      oxyCODONE (ROXICODONE) 5 MG tablet, Take 2.5 mg by mouth 3 times daily, Disp: , Rfl:      polyethylene glycol (MIRALAX/GLYCOLAX) packet, Take 17 g by mouth daily as needed for constipation, Disp: , Rfl:      SENNA-docusate sodium (SENNA S) 8.6-50 MG tablet, Take 1 tablet by mouth At Bedtime, Disp: , Rfl:      sertraline (ZOLOFT) 100 MG tablet, Take 1 tablet (100 mg) by mouth daily, Disp: 90 tablet, Rfl: 1     vitamin C (ASCORBIC ACID) 500 MG tablet, Take 500 mg by mouth daily at 4pm , Disp: , Rfl:     ALLERGIES:     Allergies   Allergen Reactions     Atorvastatin Muscle Pain (Myalgia)     Macrobid [Nitrofurantoin Anhydrous] Other (See Comments)     Body aches     Nitrofurantoin Muscle Pain (Myalgia) and Unknown     Body aches       Penicillins Unknown     Seasonal Allergies Unknown     Sulfa Drugs Other (See Comments)     Tolerated Bactrim May 2019  Entire family has allergy to Sulfa     Sulfasalazine Unknown       REVIEW OF SYSTEMS: Ten point review of systems without change as outlined in HPI    SURGICAL HISTORY:    Past Surgical  History:   Procedure Laterality Date     Cataract removal NOS Bilateral      COMBINED CYSTOSCOPY, INSERT STENT URETER(S) Left 5/6/2020    Procedure: Cystoscopy with left ureteral stent insertion;  Surgeon: Gaurav Munson MD;  Location:  OR     CYSTOSCOPY N/A 8/1/2019    Procedure: Exam under anesthesia, video cystopanendoscopy;  Surgeon: Dennis Carlson MD;  Location:  OR     ESOPHAGOSCOPY, GASTROSCOPY, DUODENOSCOPY (EGD), COMBINED N/A 6/11/2019    Procedure: ESOPHAGOGASTRODUODENOSCOPY (EGD);  Surgeon: Gaurav Degroot MD;  Location:  GI     EXTRACORPOREAL SHOCK WAVE LITHOTRIPSY (ESWL) Left 5/28/2020    Procedure: LEFT EXTRACORPOREAL SHOCK WAVE LITHOTRIPSY;  Surgeon: Gaurav Munson MD;  Location: [facility] OR     Hip Pinning procedure Left      Hip replacement NOS Right      OPEN REDUCTION INTERNAL FIXATION TIBIAL PLATEAU Right 8/28/2019    Procedure: Open reduction internal fixation right proximal tibia fracture;  Surgeon: Molina Pardo MD;  Location:  OR     TONSILLECTOMY, ADENOIDECTOMY, COMBINED           PHYSICAL EXAM:    /62   Pulse 60   Ht 1.524 m (5')   Wt 56.2 kg (124 lb)   BMI 24.22 kg/m      Constitutional: Well developed. Conversant and in no acute distress  Eyes: Anicteric sclera, conjunctiva clear, normal extraocular movements  ENT: Normocephalic and atraumatic,   Skin: Warm and dry. No rashes or lesions  Cardiac: No peripheral edema  Back/Flank: Not done  CNS/PNS: Normal musculature and movements, moves all extremities normally  Respiratory: Normal non-labored breathing  Abdomen: Soft nontender and nondistended  Peripheral Vascular: No peripheral edema  Mental Status/Psych: Alert and Oriented x 3. Normal mood and affect      External Genitalia: Not done  Bladder: Not done  Urethra: Not done   Vagina: Not done    Cystoscopy:  Not Done      Urinalysis:  UA RESULTS:  Recent Labs   Lab Test 05/06/20  1035  09/06/17  1525   COLOR Brown   < > Yellow   APPEARANCE  Cloudy   < > Cloudy   URINEGLC Negative   < > Negative   URINEBILI Negative   < > Negative   URINEKETONE Negative   < > Negative   SG 1.010   < > 1.015   UBLD Large*   < > Moderate*   URINEPH 7.5*   < > 6.0   PROTEIN 30*   < > 100*   UROBILINOGEN  --   --  0.2   NITRITE Negative   < > Negative   LEUKEST Positive*   < > Large*   RBCU >182*   < > 10-25*   WBCU >182*   < > >100*    < > = values in this interval not displayed.         IMPRESSION:    Left ureteral stones    PLAN:    She has remaining left ureteral stones adjacent to the stent.  I recommended removal in the operating room.  I  discussed cystoscopy with left ureteral stent removal, left ureteroscopy with holmium lithotripsy and stone basketing.  We discussed the possibility that a stent may need to be replaced at the end of the procedure as well.  All of their questions were answered.  She wishes to proceed to the operating room as an outpatient in the near future.      Gaurav Munson M.D.

## 2020-09-30 NOTE — PROGRESS NOTES
Office Visit Note  [facility]  [phone]    Sep 30, 2020    [unfilled]    10/11/1930    UROLOGIC DIAGNOSES:    Left ureteral stone    CURRENT INTERVENTIONS:    S/P stent and ESWL    History:    Daya had a left ureteral stent placed on day 6 and then underwent shockwave lithotripsy on May 28.  She missed her follow-up appointment 2 weeks later and is now here with her son for her delayed follow-up appointment.  She has felt well since her procedure.  She had a KUB today that shows a stent in good position but shows fragments along the distal portion of the stent.      Imaging: I reviewed her KUB images from today.  Stent in good position.  No stones in the upper ureter or kidney but larger stone or multiple fragments in the distal left ureter.    Labs:      MEDICATIONS:    Current Outpatient Medications:      acetaminophen (TYLENOL) 500 MG tablet, Take 500 mg by mouth 3 times daily, Disp: , Rfl:      acetaminophen (TYLENOL) 500 MG tablet, Take 500 mg by mouth 3 times daily as needed for mild pain, Disp: , Rfl:      amLODIPine (NORVASC) 10 MG tablet, Take 10 mg by mouth daily, Disp: , Rfl:      Calcium Carbonate (CALCIUM 600 PO), Take 1 tablet by mouth 2 times daily , Disp: , Rfl:      carvedilol (COREG) 25 MG tablet, Take 25 mg by mouth At Bedtime, Disp: , Rfl:      carvedilol (COREG) 25 MG tablet, Take 12.5 mg by mouth every morning , Disp: , Rfl:      cephALEXin (KEFLEX) 250 MG capsule, Take 250 mg by mouth daily, Disp: , Rfl:      Cholecalciferol (VITAMIN D3) 2000 UNITS CAPS, Take 2,000 Units by mouth daily (with dinner), Disp: , Rfl:      cloNIDine (CATAPRES) 0.1 MG tablet, Take 0.1 mg by mouth 2 times daily, Disp: , Rfl:      diclofenac (VOLTAREN) 1 % topical gel, Apply 2 g topically 3 times daily Apply to right knee, Disp: , Rfl:      docusate sodium (COLACE) 100 MG capsule, Take 1 capsule (100 mg) by mouth At Bedtime, Disp: , Rfl:      gabapentin (NEURONTIN) 100 MG capsule, Take 2 capsules  (200 mg) by mouth 2 times daily, Disp: 120 capsule, Rfl: 0     isosorbide dinitrate (ISORDIL) 10 MG tablet, Take 1 tablet (10 mg) by mouth 3 times daily, Disp: 270 tablet, Rfl: 3     lisinopril (ZESTRIL) 30 MG tablet, Take 30 mg by mouth daily, Disp: , Rfl:      Multiple Vitamins-Minerals (PRESERVISION AREDS) CAPS, Take 1 capsule by mouth 2 times daily , Disp: , Rfl:      omeprazole 20 MG tablet, Take 20 mg by mouth daily , Disp: , Rfl:      ondansetron (ZOFRAN-ODT) 4 MG ODT tab, Take 1 tablet (4 mg) by mouth every 6 hours as needed for nausea or vomiting, Disp: , Rfl:      oxyCODONE (ROXICODONE) 5 MG tablet, Take 2.5 mg by mouth 3 times daily, Disp: , Rfl:      polyethylene glycol (MIRALAX/GLYCOLAX) packet, Take 17 g by mouth daily as needed for constipation, Disp: , Rfl:      SENNA-docusate sodium (SENNA S) 8.6-50 MG tablet, Take 1 tablet by mouth At Bedtime, Disp: , Rfl:      sertraline (ZOLOFT) 100 MG tablet, Take 1 tablet (100 mg) by mouth daily, Disp: 90 tablet, Rfl: 1     vitamin C (ASCORBIC ACID) 500 MG tablet, Take 500 mg by mouth daily at 4pm , Disp: , Rfl:     ALLERGIES:     Allergies   Allergen Reactions     Atorvastatin Muscle Pain (Myalgia)     Macrobid [Nitrofurantoin Anhydrous] Other (See Comments)     Body aches     Nitrofurantoin Muscle Pain (Myalgia) and Unknown     Body aches       Penicillins Unknown     Seasonal Allergies Unknown     Sulfa Drugs Other (See Comments)     Tolerated Bactrim May 2019  Entire family has allergy to Sulfa     Sulfasalazine Unknown       REVIEW OF SYSTEMS: Ten point review of systems without change as outlined in HPI    SURGICAL HISTORY:    Past Surgical History:   Procedure Laterality Date     Cataract removal NOS Bilateral      COMBINED CYSTOSCOPY, INSERT STENT URETER(S) Left 5/6/2020    Procedure: Cystoscopy with left ureteral stent insertion;  Surgeon: Gaurav Munson MD;  Location: [facility] OR     CYSTOSCOPY N/A 8/1/2019    Procedure: Exam under anesthesia,  video cystopanendoscopy;  Surgeon: Dennis Carlson MD;  Location:  OR     ESOPHAGOSCOPY, GASTROSCOPY, DUODENOSCOPY (EGD), COMBINED N/A 6/11/2019    Procedure: ESOPHAGOGASTRODUODENOSCOPY (EGD);  Surgeon: Gaurav Degroot MD;  Location:  GI     EXTRACORPOREAL SHOCK WAVE LITHOTRIPSY (ESWL) Left 5/28/2020    Procedure: LEFT EXTRACORPOREAL SHOCK WAVE LITHOTRIPSY;  Surgeon: Gaurav Munson MD;  Location:  OR     Hip Pinning procedure Left      Hip replacement NOS Right      OPEN REDUCTION INTERNAL FIXATION TIBIAL PLATEAU Right 8/28/2019    Procedure: Open reduction internal fixation right proximal tibia fracture;  Surgeon: Molina Pardo MD;  Location:  OR     TONSILLECTOMY, ADENOIDECTOMY, COMBINED           PHYSICAL EXAM:    /62   Pulse 60   Ht 1.524 m (5')   Wt 56.2 kg (124 lb)   BMI 24.22 kg/m      Constitutional: Well developed. Conversant and in no acute distress  Eyes: Anicteric sclera, conjunctiva clear, normal extraocular movements  ENT: Normocephalic and atraumatic,   Skin: Warm and dry. No rashes or lesions  Cardiac: No peripheral edema  Back/Flank: Not done  CNS/PNS: Normal musculature and movements, moves all extremities normally  Respiratory: Normal non-labored breathing  Abdomen: Soft nontender and nondistended  Peripheral Vascular: No peripheral edema  Mental Status/Psych: Alert and Oriented x 3. Normal mood and affect      External Genitalia: Not done  Bladder: Not done  Urethra: Not done   Vagina: Not done    Cystoscopy:  Not Done      Urinalysis:  UA RESULTS:  Recent Labs   Lab Test 05/06/20  1035  09/06/17  1525   COLOR Brown   < > Yellow   APPEARANCE Cloudy   < > Cloudy   URINEGLC Negative   < > Negative   URINEBILI Negative   < > Negative   URINEKETONE Negative   < > Negative   SG 1.010   < > 1.015   UBLD Large*   < > Moderate*   URINEPH 7.5*   < > 6.0   PROTEIN 30*   < > 100*   UROBILINOGEN  --   --  0.2   NITRITE Negative   < > Negative   LEUKEST Positive*    < > Large*   RBCU >182*   < > 10-25*   WBCU >182*   < > >100*    < > = values in this interval not displayed.         IMPRESSION:    Left ureteral stones    PLAN:    She has remaining left ureteral stones adjacent to the stent.  I recommended removal in the operating room.  I  discussed cystoscopy with left ureteral stent removal, left ureteroscopy with holmium lithotripsy and stone basketing.  We discussed the possibility that a stent may need to be replaced at the end of the procedure as well.  All of their questions were answered.  She wishes to proceed to the operating room as an outpatient in the near future.      Gaurav Munson M.D.

## 2020-10-01 DIAGNOSIS — Z11.59 ENCOUNTER FOR SCREENING FOR OTHER VIRAL DISEASES: Primary | ICD-10-CM

## 2020-10-27 ENCOUNTER — RECORDS - HEALTHEAST (OUTPATIENT)
Dept: LAB | Facility: CLINIC | Age: 85
End: 2020-10-27

## 2020-10-28 LAB
ANION GAP SERPL CALCULATED.3IONS-SCNC: 7 MMOL/L (ref 5–18)
BUN SERPL-MCNC: 16 MG/DL (ref 8–28)
CALCIUM SERPL-MCNC: 10 MG/DL (ref 8.5–10.5)
CHLORIDE BLD-SCNC: 104 MMOL/L (ref 98–107)
CO2 SERPL-SCNC: 29 MMOL/L (ref 22–31)
CREAT SERPL-MCNC: 0.64 MG/DL (ref 0.6–1.1)
ERYTHROCYTE [DISTWIDTH] IN BLOOD BY AUTOMATED COUNT: 14.6 % (ref 11–14.5)
GFR SERPL CREATININE-BSD FRML MDRD: >60 ML/MIN/1.73M2
GLUCOSE BLD-MCNC: 92 MG/DL (ref 70–125)
HCT VFR BLD AUTO: 33.4 % (ref 35–47)
HGB BLD-MCNC: 11 G/DL (ref 12–16)
MCH RBC QN AUTO: 31.9 PG (ref 27–34)
MCHC RBC AUTO-ENTMCNC: 32.9 G/DL (ref 32–36)
MCV RBC AUTO: 97 FL (ref 80–100)
PLATELET # BLD AUTO: 232 THOU/UL (ref 140–440)
PMV BLD AUTO: 10.9 FL (ref 8.5–12.5)
POTASSIUM BLD-SCNC: 4.1 MMOL/L (ref 3.5–5)
RBC # BLD AUTO: 3.45 MILL/UL (ref 3.8–5.4)
SODIUM SERPL-SCNC: 140 MMOL/L (ref 136–145)
WBC: 6.7 THOU/UL (ref 4–11)

## 2020-10-30 ENCOUNTER — ANESTHESIA EVENT (OUTPATIENT)
Dept: SURGERY | Facility: CLINIC | Age: 85
DRG: 659 | End: 2020-10-30
Payer: COMMERCIAL

## 2020-10-30 ENCOUNTER — HOSPITAL ENCOUNTER (INPATIENT)
Facility: CLINIC | Age: 85
LOS: 6 days | Discharge: HOME | DRG: 659 | End: 2020-11-06
Attending: UROLOGY | Admitting: UROLOGY
Payer: COMMERCIAL

## 2020-10-30 ENCOUNTER — APPOINTMENT (OUTPATIENT)
Dept: GENERAL RADIOLOGY | Facility: CLINIC | Age: 85
DRG: 659 | End: 2020-10-30
Attending: UROLOGY
Payer: COMMERCIAL

## 2020-10-30 ENCOUNTER — ANESTHESIA (OUTPATIENT)
Dept: SURGERY | Facility: CLINIC | Age: 85
DRG: 659 | End: 2020-10-30
Payer: COMMERCIAL

## 2020-10-30 DIAGNOSIS — N20.1 URETERAL STONE: ICD-10-CM

## 2020-10-30 DIAGNOSIS — B49 FUNGEMIA: Primary | ICD-10-CM

## 2020-10-30 PROCEDURE — 250N000009 HC RX 250: Performed by: NURSE ANESTHETIST, CERTIFIED REGISTERED

## 2020-10-30 PROCEDURE — C1769 GUIDE WIRE: HCPCS | Performed by: UROLOGY

## 2020-10-30 PROCEDURE — 250N000011 HC RX IP 250 OP 636: Performed by: UROLOGY

## 2020-10-30 PROCEDURE — 0T778DZ DILATION OF LEFT URETER WITH INTRALUMINAL DEVICE, VIA NATURAL OR ARTIFICIAL OPENING ENDOSCOPIC: ICD-10-PCS | Performed by: UROLOGY

## 2020-10-30 PROCEDURE — 250N000011 HC RX IP 250 OP 636: Performed by: ANESTHESIOLOGY

## 2020-10-30 PROCEDURE — 370N000001 HC ANESTHESIA TECHNICAL FEE, 1ST 30 MIN: Performed by: UROLOGY

## 2020-10-30 PROCEDURE — 0TP98DZ REMOVAL OF INTRALUMINAL DEVICE FROM URETER, VIA NATURAL OR ARTIFICIAL OPENING ENDOSCOPIC: ICD-10-PCS | Performed by: UROLOGY

## 2020-10-30 PROCEDURE — 370N000002 HC ANESTHESIA TECHNICAL FEE, EACH ADDTL 15 MIN: Performed by: UROLOGY

## 2020-10-30 PROCEDURE — 52356 CYSTO/URETERO W/LITHOTRIPSY: CPT | Mod: LT | Performed by: UROLOGY

## 2020-10-30 PROCEDURE — 258N000001 HC RX 258: Performed by: UROLOGY

## 2020-10-30 PROCEDURE — 74420 UROGRAPHY RTRGR +-KUB: CPT | Mod: 26 | Performed by: UROLOGY

## 2020-10-30 PROCEDURE — 250N000009 HC RX 250: Performed by: UROLOGY

## 2020-10-30 PROCEDURE — 761N000003 HC RECOVERY PHASE 1 LEVEL 2 FIRST HR: Performed by: UROLOGY

## 2020-10-30 PROCEDURE — 88300 SURGICAL PATH GROSS: CPT | Mod: 26

## 2020-10-30 PROCEDURE — 250N000011 HC RX IP 250 OP 636: Performed by: NURSE ANESTHETIST, CERTIFIED REGISTERED

## 2020-10-30 PROCEDURE — 0TJ98ZZ INSPECTION OF URETER, VIA NATURAL OR ARTIFICIAL OPENING ENDOSCOPIC: ICD-10-PCS | Performed by: UROLOGY

## 2020-10-30 PROCEDURE — 88300 SURGICAL PATH GROSS: CPT | Mod: TC | Performed by: UROLOGY

## 2020-10-30 PROCEDURE — 258N000003 HC RX IP 258 OP 636: Performed by: ANESTHESIOLOGY

## 2020-10-30 PROCEDURE — 82365 CALCULUS SPECTROSCOPY: CPT | Performed by: UROLOGY

## 2020-10-30 PROCEDURE — 0TC78ZZ EXTIRPATION OF MATTER FROM LEFT URETER, VIA NATURAL OR ARTIFICIAL OPENING ENDOSCOPIC: ICD-10-PCS | Performed by: UROLOGY

## 2020-10-30 PROCEDURE — 255N000002 HC RX 255 OP 636: Performed by: UROLOGY

## 2020-10-30 PROCEDURE — 250N000013 HC RX MED GY IP 250 OP 250 PS 637: Performed by: UROLOGY

## 2020-10-30 PROCEDURE — 360N000021 HC SURGERY LEVEL 3 EA 15 ADDTL MIN: Performed by: UROLOGY

## 2020-10-30 PROCEDURE — C2617 STENT, NON-COR, TEM W/O DEL: HCPCS | Performed by: UROLOGY

## 2020-10-30 PROCEDURE — 999N000136 HC STATISTIC PRE PROC ASSESS II: Performed by: UROLOGY

## 2020-10-30 PROCEDURE — 761N000004 HC RECOVERY PHASE 1 LEVEL 2 EA ADDTL HR: Performed by: UROLOGY

## 2020-10-30 PROCEDURE — 272N000001 HC OR GENERAL SUPPLY STERILE: Performed by: UROLOGY

## 2020-10-30 PROCEDURE — 360N000024 HC SURGERY LEVEL 3 W FLUORO 1ST 30 MIN: Performed by: UROLOGY

## 2020-10-30 PROCEDURE — 999N000179 XR SURGERY CARM FLUORO LESS THAN 5 MIN W STILLS: Mod: TC

## 2020-10-30 DEVICE — STENT URETERAL POLARIS ULTRA 5FRX22CM M0061921210: Type: IMPLANTABLE DEVICE | Site: URETER | Status: FUNCTIONAL

## 2020-10-30 RX ORDER — POLYETHYLENE GLYCOL 3350 17 G/17G
17 POWDER, FOR SOLUTION ORAL DAILY PRN
Status: DISCONTINUED | OUTPATIENT
Start: 2020-10-30 | End: 2020-11-06 | Stop reason: HOSPADM

## 2020-10-30 RX ORDER — ATROPA BELLADONNA AND OPIUM 16.2; 3 MG/1; MG/1
SUPPOSITORY RECTAL PRN
Status: DISCONTINUED | OUTPATIENT
Start: 2020-10-30 | End: 2020-10-30 | Stop reason: HOSPADM

## 2020-10-30 RX ORDER — CEFAZOLIN SODIUM 2 G/100ML
2 INJECTION, SOLUTION INTRAVENOUS
Status: COMPLETED | OUTPATIENT
Start: 2020-10-30 | End: 2020-10-30

## 2020-10-30 RX ORDER — ACETAMINOPHEN 500 MG
500 TABLET ORAL 3 TIMES DAILY
Status: DISCONTINUED | OUTPATIENT
Start: 2020-10-30 | End: 2020-11-06 | Stop reason: HOSPADM

## 2020-10-30 RX ORDER — SERTRALINE HYDROCHLORIDE 100 MG/1
100 TABLET, FILM COATED ORAL DAILY
Status: DISCONTINUED | OUTPATIENT
Start: 2020-10-31 | End: 2020-10-31

## 2020-10-30 RX ORDER — LABETALOL 20 MG/4 ML (5 MG/ML) INTRAVENOUS SYRINGE
10 ONCE
Status: COMPLETED | OUTPATIENT
Start: 2020-10-30 | End: 2020-10-30

## 2020-10-30 RX ORDER — HYDROMORPHONE HYDROCHLORIDE 1 MG/ML
.3-.5 INJECTION, SOLUTION INTRAMUSCULAR; INTRAVENOUS; SUBCUTANEOUS EVERY 10 MIN PRN
Status: DISCONTINUED | OUTPATIENT
Start: 2020-10-30 | End: 2020-10-30 | Stop reason: HOSPADM

## 2020-10-30 RX ORDER — SODIUM CHLORIDE, SODIUM LACTATE, POTASSIUM CHLORIDE, CALCIUM CHLORIDE 600; 310; 30; 20 MG/100ML; MG/100ML; MG/100ML; MG/100ML
INJECTION, SOLUTION INTRAVENOUS CONTINUOUS
Status: DISCONTINUED | OUTPATIENT
Start: 2020-10-30 | End: 2020-10-30 | Stop reason: HOSPADM

## 2020-10-30 RX ORDER — LIDOCAINE 40 MG/G
CREAM TOPICAL
Status: DISCONTINUED | OUTPATIENT
Start: 2020-10-30 | End: 2020-10-30 | Stop reason: HOSPADM

## 2020-10-30 RX ORDER — GABAPENTIN 100 MG/1
200 CAPSULE ORAL 2 TIMES DAILY
Status: DISCONTINUED | OUTPATIENT
Start: 2020-10-30 | End: 2020-11-06 | Stop reason: HOSPADM

## 2020-10-30 RX ORDER — AMOXICILLIN 250 MG
1 CAPSULE ORAL AT BEDTIME
Status: DISCONTINUED | OUTPATIENT
Start: 2020-10-30 | End: 2020-11-01 | Stop reason: CLARIF

## 2020-10-30 RX ORDER — CLONIDINE HYDROCHLORIDE 0.1 MG/1
0.1 TABLET ORAL 2 TIMES DAILY
Status: DISCONTINUED | OUTPATIENT
Start: 2020-10-30 | End: 2020-11-06 | Stop reason: HOSPADM

## 2020-10-30 RX ORDER — ONDANSETRON 4 MG/1
4 TABLET, ORALLY DISINTEGRATING ORAL EVERY 6 HOURS PRN
Status: DISCONTINUED | OUTPATIENT
Start: 2020-10-30 | End: 2020-11-06 | Stop reason: HOSPADM

## 2020-10-30 RX ORDER — CARVEDILOL 12.5 MG/1
12.5 TABLET ORAL EVERY MORNING
Status: DISCONTINUED | OUTPATIENT
Start: 2020-10-31 | End: 2020-11-02

## 2020-10-30 RX ORDER — ALBUTEROL SULFATE 0.83 MG/ML
2.5 SOLUTION RESPIRATORY (INHALATION) EVERY 4 HOURS PRN
Status: DISCONTINUED | OUTPATIENT
Start: 2020-10-30 | End: 2020-10-30 | Stop reason: HOSPADM

## 2020-10-30 RX ORDER — PHENYLEPHRINE HYDROCHLORIDE 10 MG/ML
INJECTION INTRAVENOUS PRN
Status: DISCONTINUED | OUTPATIENT
Start: 2020-10-30 | End: 2020-10-30

## 2020-10-30 RX ORDER — PROPOFOL 10 MG/ML
INJECTION, EMULSION INTRAVENOUS PRN
Status: DISCONTINUED | OUTPATIENT
Start: 2020-10-30 | End: 2020-10-30

## 2020-10-30 RX ORDER — FENTANYL CITRATE 50 UG/ML
25-50 INJECTION, SOLUTION INTRAMUSCULAR; INTRAVENOUS EVERY 5 MIN PRN
Status: DISCONTINUED | OUTPATIENT
Start: 2020-10-30 | End: 2020-10-30 | Stop reason: HOSPADM

## 2020-10-30 RX ORDER — ONDANSETRON 2 MG/ML
4 INJECTION INTRAMUSCULAR; INTRAVENOUS EVERY 8 HOURS PRN
Status: DISCONTINUED | OUTPATIENT
Start: 2020-10-30 | End: 2020-11-06 | Stop reason: HOSPADM

## 2020-10-30 RX ORDER — DEXAMETHASONE SODIUM PHOSPHATE 4 MG/ML
INJECTION, SOLUTION INTRA-ARTICULAR; INTRALESIONAL; INTRAMUSCULAR; INTRAVENOUS; SOFT TISSUE PRN
Status: DISCONTINUED | OUTPATIENT
Start: 2020-10-30 | End: 2020-10-30

## 2020-10-30 RX ORDER — ONDANSETRON 2 MG/ML
4 INJECTION INTRAMUSCULAR; INTRAVENOUS EVERY 30 MIN PRN
Status: DISCONTINUED | OUTPATIENT
Start: 2020-10-30 | End: 2020-10-30 | Stop reason: HOSPADM

## 2020-10-30 RX ORDER — LIDOCAINE 40 MG/G
CREAM TOPICAL
Status: DISCONTINUED | OUTPATIENT
Start: 2020-10-30 | End: 2020-11-01

## 2020-10-30 RX ORDER — NALOXONE HYDROCHLORIDE 0.4 MG/ML
.1-.4 INJECTION, SOLUTION INTRAMUSCULAR; INTRAVENOUS; SUBCUTANEOUS
Status: DISCONTINUED | OUTPATIENT
Start: 2020-10-30 | End: 2020-10-30 | Stop reason: HOSPADM

## 2020-10-30 RX ORDER — ISOSORBIDE DINITRATE 10 MG/1
10 TABLET ORAL 3 TIMES DAILY
Status: DISCONTINUED | OUTPATIENT
Start: 2020-10-30 | End: 2020-11-06 | Stop reason: HOSPADM

## 2020-10-30 RX ORDER — CARVEDILOL 25 MG/1
25 TABLET ORAL AT BEDTIME
Status: DISCONTINUED | OUTPATIENT
Start: 2020-10-30 | End: 2020-11-06 | Stop reason: HOSPADM

## 2020-10-30 RX ORDER — ONDANSETRON 2 MG/ML
INJECTION INTRAMUSCULAR; INTRAVENOUS PRN
Status: DISCONTINUED | OUTPATIENT
Start: 2020-10-30 | End: 2020-10-30

## 2020-10-30 RX ORDER — DOCUSATE SODIUM 100 MG/1
100 CAPSULE, LIQUID FILLED ORAL AT BEDTIME
Status: DISCONTINUED | OUTPATIENT
Start: 2020-10-30 | End: 2020-11-06 | Stop reason: HOSPADM

## 2020-10-30 RX ORDER — CEFAZOLIN SODIUM 1 G/3ML
1 INJECTION, POWDER, FOR SOLUTION INTRAMUSCULAR; INTRAVENOUS SEE ADMIN INSTRUCTIONS
Status: DISCONTINUED | OUTPATIENT
Start: 2020-10-30 | End: 2020-10-30 | Stop reason: HOSPADM

## 2020-10-30 RX ORDER — MEPERIDINE HYDROCHLORIDE 25 MG/ML
12.5 INJECTION INTRAMUSCULAR; INTRAVENOUS; SUBCUTANEOUS
Status: DISCONTINUED | OUTPATIENT
Start: 2020-10-30 | End: 2020-10-30 | Stop reason: HOSPADM

## 2020-10-30 RX ORDER — NALOXONE HYDROCHLORIDE 0.4 MG/ML
.1-.4 INJECTION, SOLUTION INTRAMUSCULAR; INTRAVENOUS; SUBCUTANEOUS
Status: DISCONTINUED | OUTPATIENT
Start: 2020-10-30 | End: 2020-11-06 | Stop reason: HOSPADM

## 2020-10-30 RX ORDER — GLYCOPYRROLATE 0.2 MG/ML
INJECTION, SOLUTION INTRAMUSCULAR; INTRAVENOUS PRN
Status: DISCONTINUED | OUTPATIENT
Start: 2020-10-30 | End: 2020-10-30

## 2020-10-30 RX ORDER — EPHEDRINE SULFATE 50 MG/ML
INJECTION, SOLUTION INTRAVENOUS PRN
Status: DISCONTINUED | OUTPATIENT
Start: 2020-10-30 | End: 2020-10-30

## 2020-10-30 RX ORDER — FENTANYL CITRATE 50 UG/ML
25-50 INJECTION, SOLUTION INTRAMUSCULAR; INTRAVENOUS
Status: CANCELLED | OUTPATIENT
Start: 2020-10-30

## 2020-10-30 RX ORDER — LIDOCAINE HYDROCHLORIDE 10 MG/ML
INJECTION, SOLUTION INFILTRATION; PERINEURAL PRN
Status: DISCONTINUED | OUTPATIENT
Start: 2020-10-30 | End: 2020-10-30

## 2020-10-30 RX ORDER — ACETAMINOPHEN 500 MG
500 TABLET ORAL 3 TIMES DAILY PRN
Status: DISCONTINUED | OUTPATIENT
Start: 2020-10-30 | End: 2020-11-01

## 2020-10-30 RX ORDER — AMLODIPINE BESYLATE 10 MG/1
10 TABLET ORAL DAILY
Status: DISCONTINUED | OUTPATIENT
Start: 2020-10-31 | End: 2020-11-02

## 2020-10-30 RX ORDER — ONDANSETRON 4 MG/1
4 TABLET, ORALLY DISINTEGRATING ORAL EVERY 30 MIN PRN
Status: DISCONTINUED | OUTPATIENT
Start: 2020-10-30 | End: 2020-10-30 | Stop reason: HOSPADM

## 2020-10-30 RX ORDER — CALCIUM CARBONATE 500(1250)
500 TABLET ORAL 2 TIMES DAILY
Status: DISCONTINUED | OUTPATIENT
Start: 2020-10-30 | End: 2020-11-06 | Stop reason: HOSPADM

## 2020-10-30 RX ORDER — HYDROMORPHONE HYDROCHLORIDE 1 MG/ML
0.2 INJECTION, SOLUTION INTRAMUSCULAR; INTRAVENOUS; SUBCUTANEOUS
Status: DISCONTINUED | OUTPATIENT
Start: 2020-10-30 | End: 2020-10-31

## 2020-10-30 RX ADMIN — GABAPENTIN 200 MG: 100 CAPSULE ORAL at 21:41

## 2020-10-30 RX ADMIN — DOCUSATE SODIUM 50 MG AND SENNOSIDES 8.6 MG 1 TABLET: 8.6; 5 TABLET, FILM COATED ORAL at 21:41

## 2020-10-30 RX ADMIN — PROPOFOL 120 MG: 10 INJECTION, EMULSION INTRAVENOUS at 16:38

## 2020-10-30 RX ADMIN — FENTANYL CITRATE 100 MCG: 50 INJECTION, SOLUTION INTRAMUSCULAR; INTRAVENOUS at 16:38

## 2020-10-30 RX ADMIN — LABETALOL 20 MG/4 ML (5 MG/ML) INTRAVENOUS SYRINGE 10 MG: at 20:13

## 2020-10-30 RX ADMIN — LABETALOL 20 MG/4 ML (5 MG/ML) INTRAVENOUS SYRINGE 10 MG: at 19:38

## 2020-10-30 RX ADMIN — ONDANSETRON 4 MG: 2 INJECTION INTRAMUSCULAR; INTRAVENOUS at 19:43

## 2020-10-30 RX ADMIN — ISOSORBIDE DINITRATE 10 MG: 10 TABLET ORAL at 22:41

## 2020-10-30 RX ADMIN — LIDOCAINE HYDROCHLORIDE 50 MG: 10 INJECTION, SOLUTION INFILTRATION; PERINEURAL at 16:38

## 2020-10-30 RX ADMIN — GLYCOPYRROLATE 0.2 MG: 0.2 INJECTION, SOLUTION INTRAMUSCULAR; INTRAVENOUS at 16:55

## 2020-10-30 RX ADMIN — ONDANSETRON HYDROCHLORIDE 4 MG: 2 INJECTION, SOLUTION INTRAVENOUS at 16:44

## 2020-10-30 RX ADMIN — CLONIDINE HYDROCHLORIDE 0.1 MG: 0.1 TABLET ORAL at 21:41

## 2020-10-30 RX ADMIN — LABETALOL 20 MG/4 ML (5 MG/ML) INTRAVENOUS SYRINGE 10 MG: at 17:46

## 2020-10-30 RX ADMIN — DOCUSATE SODIUM 100 MG: 100 CAPSULE, LIQUID FILLED ORAL at 21:41

## 2020-10-30 RX ADMIN — EPHEDRINE SULFATE 7.5 MG: 50 INJECTION, SOLUTION INTRAVENOUS at 16:55

## 2020-10-30 RX ADMIN — ACETAMINOPHEN 500 MG: 500 TABLET, FILM COATED ORAL at 21:41

## 2020-10-30 RX ADMIN — DEXAMETHASONE SODIUM PHOSPHATE 4 MG: 4 INJECTION, SOLUTION INTRA-ARTICULAR; INTRALESIONAL; INTRAMUSCULAR; INTRAVENOUS; SOFT TISSUE at 16:38

## 2020-10-30 RX ADMIN — CEFAZOLIN SODIUM 2 G: 2 INJECTION, SOLUTION INTRAVENOUS at 16:31

## 2020-10-30 RX ADMIN — SODIUM CHLORIDE, POTASSIUM CHLORIDE, SODIUM LACTATE AND CALCIUM CHLORIDE: 600; 310; 30; 20 INJECTION, SOLUTION INTRAVENOUS at 16:31

## 2020-10-30 RX ADMIN — LABETALOL 20 MG/4 ML (5 MG/ML) INTRAVENOUS SYRINGE 10 MG: at 19:03

## 2020-10-30 RX ADMIN — CARVEDILOL 25 MG: 25 TABLET, FILM COATED ORAL at 21:41

## 2020-10-30 RX ADMIN — Medication 2.5 MG: at 21:41

## 2020-10-30 RX ADMIN — PHENYLEPHRINE HYDROCHLORIDE 100 MCG: 10 INJECTION INTRAVENOUS at 16:53

## 2020-10-30 ASSESSMENT — MIFFLIN-ST. JEOR: SCORE: 917.57

## 2020-10-30 NOTE — ANESTHESIA POSTPROCEDURE EVALUATION
Patient: Daya Ignacio    Procedure(s):  Cystoscopy, removal left ureteral stent, left ureteroscopy with holmium laser lithotripsy and stone basketing    Diagnosis:Ureteral stone [N20.1]  Diagnosis Additional Information: No value filed.    Anesthesia Type:  General    Note:  Anesthesia Post Evaluation    Patient location during evaluation: PACU  Patient participation: Able to fully participate in evaluation  Level of consciousness: awake  Pain management: adequate  Airway patency: patent  Cardiovascular status: acceptable  Respiratory status: acceptable  Hydration status: acceptable  PONV: none             Last vitals:  Vitals:    10/30/20 1740 10/30/20 1745 10/30/20 1800   BP: (!) 202/87 (!) 202/87 (!) 188/84   Pulse: 73 75 69   Resp: 16 15 13   Temp:      SpO2: 100% 100% 99%         Electronically Signed By: Jet Bales MD  October 30, 2020  6:13 PM

## 2020-10-30 NOTE — ANESTHESIA CARE TRANSFER NOTE
Patient: Daya Ignacio    Procedure(s):  Cystoscopy, removal left ureteral stent, left ureteroscopy with holmium laser lithotripsy and stone basketing    Diagnosis: Ureteral stone [N20.1]  Diagnosis Additional Information: No value filed.    Anesthesia Type:   General     Note:  Airway :Face Mask  Patient transferred to:PACU  Handoff Report: Identifed the Patient, Identified the Reponsible Provider, Reviewed the pertinent medical history, Discussed the surgical course, Reviewed Intra-OP anesthesia mangement and issues during anesthesia, Set expectations for post-procedure period and Allowed opportunity for questions and acknowledgement of understanding      Vitals: (Last set prior to Anesthesia Care Transfer)    CRNA VITALS  10/30/2020 1652 - 10/30/2020 1731      10/30/2020             EKG:  Sinus rhythm                Electronically Signed By: KARTHIK Crespo CRNA  October 30, 2020  5:31 PM

## 2020-10-30 NOTE — DISCHARGE INSTRUCTIONS
GENERAL ANESTHESIA OR SEDATION ADULT DISCHARGE INSTRUCTIONS   SPECIAL PRECAUTIONS FOR 24 HOURS AFTER SURGERY    IT IS NOT UNUSUAL TO FEEL LIGHT-HEADED OR FAINT, UP TO 24 HOURS AFTER SURGERY OR WHILE TAKING PAIN MEDICATION.  IF YOU HAVE THESE SYMPTOMS; SIT FOR A FEW MINUTES BEFORE STANDING AND HAVE SOMEONE ASSIST YOU WHEN YOU GET UP TO WALK OR USE THE BATHROOM.    YOU SHOULD REST AND RELAX FOR THE NEXT 24 HOURS AND YOU MUST MAKE ARRANGEMENTS TO HAVE SOMEONE STAY WITH YOU FOR AT LEAST 24 HOURS AFTER YOUR DISCHARGE.  AVOID HAZARDOUS AND STRENUOUS ACTIVITIES.  DO NOT MAKE IMPORTANT DECISIONS FOR 24 HOURS.    DO NOT DRIVE ANY VEHICLE OR OPERATE MECHANICAL EQUIPMENT FOR 24 HOURS FOLLOWING THE END OF YOUR SURGERY.  EVEN THOUGH YOU MAY FEEL NORMAL, YOUR REACTIONS MAY BE AFFECTED BY THE MEDICATION YOU HAVE RECEIVED.    DO NOT DRINK ALCOHOLIC BEVERAGES FOR 24 HOURS FOLLOWING YOUR SURGERY.    DRINK CLEAR LIQUIDS (APPLE JUICE, GINGER ALE, 7-UP, BROTH, ETC.).  PROGRESS TO YOUR REGULAR DIET AS YOU FEEL ABLE.    YOU MAY HAVE A DRY MOUTH, A SORE THROAT, MUSCLES ACHES OR TROUBLE SLEEPING.  THESE SHOULD GO AWAY AFTER 24 HOURS.    CALL YOUR DOCTOR FOR ANY OF THE FOLLOWING:  SIGNS OF INFECTION (FEVER, GROWING TENDERNESS AT THE SURGERY SITE, A LARGE AMOUNT OF DRAINAGE OR BLEEDING, SEVERE PAIN, FOUL-SMELLING DRAINAGE, REDNESS OR SWELLING.    IT HAS BEEN OVER 8 TO 10 HOURS SINCE SURGERY AND YOU ARE STILL NOT ABLE TO URINATE (PASS WATER).     CYSTOSCOPY DISCHARGE INSTRUCTIONS  [facility]  [facility]  [phone]    YOU MAY GO BACK TO YOUR NORMAL DIET AND ACTIVITY, UNLESS YOUR DOCTOR TELLS YOU NOT TO.    FOR THE NEXT TWO DAYS, YOU MAY NOTICE:    SOME BLOOD IN YOUR URINE.  SOME BURNING WHEN YOU URINATE.  AN URGE TO URINATE MORE OFTEN.  BLADDER SPASMS.    THESE ARE NORMAL AFTER THE PROCEDURE.  THEY SHOULD GO AWAY AFTER A DAY OR TWO.  TO RELIEVE THESE PROBLEMS:     DRINK 6 TO 8 LARGE GLASSES OF WATER EACH DAY  (INCLUDES DRINKS AT MEALS).  THIS WILL HELP CLEAR THE URINE.    TAKE WARM BATHS TO RELIEVE PAIN AND BLADDER SPASMS.  DO NOT ADD ANYTHING TO THE BATH WATER.    YOUR DOCTOR MAY PRESCRIBE PAIN MEDICINE.  YOU MAY ALSO TAKE TYLENOL (ACETAMINOPHEN) FOR PAIN.    CALL YOUR SURGEON IF YOU HAVE:    A FEVER OVER 101 DEGREES.  CHECK YOUR TEMPERATURE UNDER YOUR TONGUE.    CHILLS.    FAILURE TO URINATE (NO URINE COMES OUT WHEN YOU TRY TO USE THE TOILET).  TRY SOAKING IN A BATHTUB FULL OF WARM WATER.  IF STILL NO URINE, CALL YOUR DOCTOR.    A LOT OF BLOOD IN THE URINE, OR BLOOD CLOTS LARGER THAN A NICKEL.      PAIN IN THE BACK OR BELLY AREA (ABDOMEN).    PAIN OR SPASMS THAT ARE NOT RELIEVED BY WARM TUB BATHS AND PAIN MEDICINE.      SEVERE PAIN, BURNING OR OTHER PROBLEMS WHILE PASSING URINE.    PAIN THAT GETS WORSE AFTER TWO DAYS.      STENT INFORMATION/DISCHARGE INSTRUCTIONS  [facility]  [facility]  [phone]    During surgery, a stent may be placed in the ureter.  The ureter is the tube that drains urine from the kidney to the bladder.  The stent is placed to dilate (open) the ureter so stone fragments can pass easily through the ureter or to decrease ureteral swelling after surgery or to relieve an obstruction.      The stent is made of silicone.  The upper end of the stent curls in the kidney while the lower end rests in the bladder.    While the stent is in place you may experience the following symptoms:  Blood and/or small blood clots in the urine  Bladder spasms (frequency and urgency of urination)  Discomfort or aching in the back or side where the stent is  Burning or discomfort at the end of urine stream    To decrease these symptoms you should:  Take antispasmodic medication as prescribed (Detrol, Ditropan, etc.)  Drink plenty of fluids but avoid caffeine and citrus (include cranberry)  If you are having discomfort in back or side, decrease activity    Please call your physician or  the physician on call if you experience:  Fever greater than 101 degrees  Severe pain not relieved by pain medication or rest    Please make an appointment for the removal of the stent according to your physician's instructions.

## 2020-10-30 NOTE — OP NOTE
Procedure Date: 10/30/2020      SURGEON:  Gaurav Munson MD      PREOPERATIVE DIAGNOSIS:  Left ureteral stones.      POSTOPERATIVE DIAGNOSIS:  Left ureteral stones.      PROCEDURE PERFORMED:  Cystoscopy, left ureteral stent exchange, left retrograde pyelogram, interpretation of fluoroscopic images, left ureteroscopy with holmium lithotripsy and stone basketing.      ANESTHESIA:  General.      COMPLICATIONS:  None.      INDICATIONS FOR PROCEDURE:  Daya Ignacio is a 89-year-old woman who has had a stent in place for a prolonged period of time.  She underwent a shockwave lithotripsy and was lost to followup.  She was found to have a stone in the left ureter adjacent to the stent and now presents for removal of her stone.      FINDINGS:  Encrusted stent; stent needed to be replaced at the end the case.      DETAILS OF THE PROCEDURE:  Risks and benefits of the procedure were explained in detail to patient, and informed consent was obtained.  The patient was brought to the operating room, placed supine on the table, where she underwent general endotracheal anesthetic.  She was then moved down to the dorsal lithotomy position, where she was prepped and draped in standard sterile fashion.        The procedure began by introducing the 22-Kiswahili cystoscope through the urethra into the bladder and performing cystoscopy.  There were no urothelial abnormalities identified.  I grasped the stent and pulled it to the level of the urethral meatus.  The stent was encrusted, and I could not recannulated the stent.  Therefore, I reinserted the cystoscope and passed a Glidewire alongside the stent into the left kidney with fluoroscopic guidance.  I removed the stent with some difficulty, leaving the wire in place.  Alongside the Glidewire, I passed a rigid ureteroscope.  I found multiple small stone fragments in the left ureter that were basketed out.  There was one larger stone.  I used the 365 micron holmium laser fiber to break  the stone into multiple fragments.  These fragments were then basketed and placed in the bladder.  I then performed ureteroscopy all the way up to the left kidney, and no stones remained.  The left ureter was quite irritated from the stent and encrusted, and I decided we would need to replace a stent.  I performed retrograde pyelogram, removed the rigid ureteroscope.  I then passed a 5 x 22 double-J ureteral stent and wire.  The wire was pulled back, and a good curl was seen in the renal pelvis under fluoroscopy.  I reinserted the scope, and a good curl was seen in the bladder under direct visualization.  The bladder was drained, and the stone fragments were collected and sent for analysis.  I placed a B and O suppository at the end the case.      The patient tolerated the procedure without complications.  She went to the post-anesthetic care unit in good condition.  She will go home from there.  I will see her back in 1-2 weeks for stent removal in the office.         ROZINA PALOMINO MD             D: 10/30/2020   T: 10/30/2020   MT: ROBERTO      Name:     MAGALY MCDANIELS   MRN:      [id number]        Account:        [id number]   :      10/11/1930           Procedure Date: 10/30/2020      Document: [id number]

## 2020-10-30 NOTE — ANESTHESIA PREPROCEDURE EVALUATION
Anesthesia Pre-Procedure Evaluation    Patient: Daya Ignacio   MRN: [id number] : 10/11/1930          Preoperative Diagnosis: Ureteral stone [N20.1]    Procedure(s):  Cystoscopy, removal left ureteral stent, left ureteroscopy with holmium laser lithotripsy and stone basketing    Past Medical History:   Diagnosis Date     Anemia      Anxiety      Arthritis      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 2014     Depression      h/o Clostridium difficile colitis 2019     History of blood transfusion      Hypertension      Kidney stone      Lumbar compression fracture (H)      Macular degeneration (senile) of retina, unspecified      Mild cognitive impairment      Mumps      Other chronic pain     low back     Recurrent UTI      Upper GI bleed 2019     Urinary tract infection due to extended-spectrum beta lactamase (ESBL)-producing Klebsiella 2019    resistant to Bactrim     Past Surgical History:   Procedure Laterality Date     Cataract removal NOS Bilateral      COMBINED CYSTOSCOPY, INSERT STENT URETER(S) Left 2020    Procedure: Cystoscopy with left ureteral stent insertion;  Surgeon: Gaurav Munson MD;  Location:  OR     CYSTOSCOPY N/A 2019    Procedure: Exam under anesthesia, video cystopanendoscopy;  Surgeon: Dennis Carlson MD;  Location:  OR     ESOPHAGOSCOPY, GASTROSCOPY, DUODENOSCOPY (EGD), COMBINED N/A 2019    Procedure: ESOPHAGOGASTRODUODENOSCOPY (EGD);  Surgeon: Gaurav Degroot MD;  Location:  GI     EXTRACORPOREAL SHOCK WAVE LITHOTRIPSY (ESWL) Left 2020    Procedure: LEFT EXTRACORPOREAL SHOCK WAVE LITHOTRIPSY;  Surgeon: Gaurav Munson MD;  Location:  OR     Hip Pinning procedure Left      Hip replacement NOS Right      OPEN REDUCTION INTERNAL FIXATION TIBIAL PLATEAU Right 2019    Procedure: Open reduction internal fixation right proximal tibia fracture;  Surgeon: Molina Pardo MD;  Location:  OR     TONSILLECTOMY,  ADENOIDECTOMY, COMBINED       Anesthesia Evaluation     . Pt has had prior anesthetic. Type: General    No history of anesthetic complications          ROS/MED HX    ENT/Pulmonary:  - neg pulmonary ROS    (-) asthma   Neurologic:     (+)dementia,     Cardiovascular:     (+) hypertension--CAD, -past MI ( ?),-. : . . . :. .       METS/Exercise Tolerance:     Hematologic:     (+) Anemia, -      Musculoskeletal:   (+) arthritis,  -       GI/Hepatic:         Renal/Genitourinary:     (+) Nephrolithiasis , Other Renal/ Genitourinary, chronic UTI      Endo:         Psychiatric:     (+) psychiatric history anxiety, depression and other (comment) (cog imparement )      Infectious Disease:   (+) Other Infectious Disease ESBL      Malignancy:      - no malignancy   Other:    (+) H/O Chronic Pain,                        Physical Exam  Normal systems: cardiovascular and pulmonary    Airway   Mallampati: II  TM distance: >3 FB  Neck ROM: full    Dental   (+) lower dentures    Cardiovascular       Pulmonary             Lab Results   Component Value Date    WBC 5.3 05/11/2020    HGB 11.6 (L) 05/11/2020    HCT 35.5 05/11/2020    PLT 96 (L) 05/11/2020    CRP 21.5 (H) 05/11/2020    SED 10 07/09/2007     05/11/2020    POTASSIUM 4.2 05/12/2020    CHLORIDE 105 05/11/2020    CO2 25 05/11/2020    BUN 20 05/11/2020    CR 0.50 (L) 05/11/2020     (H) 05/11/2020    DAVE 8.9 05/11/2020    PHOS 5.5 (H) 07/15/2019    MAG 1.7 08/29/2019    ALBUMIN 3.7 05/06/2020    PROTTOTAL 7.3 05/06/2020    ALT 12 05/06/2020    AST 15 05/06/2020    ALKPHOS 113 05/06/2020    BILITOTAL 1.3 05/06/2020    LIPASE 42 (L) 05/06/2020    AMYLASE 361 (H) 06/25/2011    PTT 29 06/23/2011    INR 1.13 05/06/2020    FIBR 177 (L) 06/23/2011    TSH 0.91 08/23/2013       Preop Vitals  BP Readings from Last 3 Encounters:   10/30/20 138/58   09/30/20 120/62   05/28/20 (!) 142/61    Pulse Readings from Last 3 Encounters:   09/30/20 60   05/28/20 55   05/11/20 80      Resp  Readings from Last 3 Encounters:   10/30/20 20   05/28/20 14   05/12/20 20    SpO2 Readings from Last 3 Encounters:   10/30/20 97%   05/28/20 100%   05/12/20 95%      Temp Readings from Last 1 Encounters:   10/30/20 97.8  F (36.6  C) (Temporal)    Ht Readings from Last 1 Encounters:   10/30/20 1.524 m (5')      Wt Readings from Last 1 Encounters:   10/30/20 57.6 kg (127 lb)    Estimated body mass index is 24.8 kg/m  as calculated from the following:    Height as of this encounter: 1.524 m (5').    Weight as of this encounter: 57.6 kg (127 lb).       Anesthesia Plan      History & Physical Review  History and physical reviewed and following examination; no interval change.    ASA Status:  3 .    NPO Status:  > 8 hours (at 1600)    Plan for General with Intravenous induction. Maintenance will be Inhalation.    PONV prophylaxis:  Ondansetron (or other 5HT-3) and Dexamethasone or Solumedrol         Postoperative Care  Postoperative pain management:  IV analgesics.      Consents  Anesthetic plan, risks, benefits and alternatives discussed with:  Patient..                 Gabe Evans MD                    .

## 2020-10-31 ENCOUNTER — APPOINTMENT (OUTPATIENT)
Dept: GENERAL RADIOLOGY | Facility: CLINIC | Age: 85
DRG: 659 | End: 2020-10-31
Attending: PHYSICIAN ASSISTANT
Payer: COMMERCIAL

## 2020-10-31 LAB
ALBUMIN UR-MCNC: 200 MG/DL
ANION GAP SERPL CALCULATED.3IONS-SCNC: 9 MMOL/L (ref 3–14)
APPEARANCE UR: ABNORMAL
BACTERIA #/AREA URNS HPF: ABNORMAL /HPF
BILIRUB UR QL STRIP: NEGATIVE
BUN SERPL-MCNC: 20 MG/DL (ref 7–30)
CALCIUM SERPL-MCNC: 8.4 MG/DL (ref 8.5–10.1)
CHLORIDE SERPL-SCNC: 104 MMOL/L (ref 94–109)
CO2 SERPL-SCNC: 25 MMOL/L (ref 20–32)
COLOR UR AUTO: ABNORMAL
CREAT SERPL-MCNC: 1.2 MG/DL (ref 0.52–1.04)
ERYTHROCYTE [DISTWIDTH] IN BLOOD BY AUTOMATED COUNT: 14.6 % (ref 10–15)
GFR SERPL CREATININE-BSD FRML MDRD: 40 ML/MIN/{1.73_M2}
GLUCOSE SERPL-MCNC: 93 MG/DL (ref 70–99)
GLUCOSE UR STRIP-MCNC: NEGATIVE MG/DL
HCT VFR BLD AUTO: 28.1 % (ref 35–47)
HGB BLD-MCNC: 9.1 G/DL (ref 11.7–15.7)
HGB UR QL STRIP: ABNORMAL
KETONES UR STRIP-MCNC: NEGATIVE MG/DL
LACTATE BLD-SCNC: 2.6 MMOL/L (ref 0.7–2)
LACTATE BLD-SCNC: 3.4 MMOL/L (ref 0.7–2)
LEUKOCYTE ESTERASE UR QL STRIP: ABNORMAL
MCH RBC QN AUTO: 32.4 PG (ref 26.5–33)
MCHC RBC AUTO-ENTMCNC: 32.4 G/DL (ref 31.5–36.5)
MCV RBC AUTO: 100 FL (ref 78–100)
MUCOUS THREADS #/AREA URNS LPF: PRESENT /LPF
NITRATE UR QL: NEGATIVE
PH UR STRIP: 5.5 PH (ref 5–7)
PLATELET # BLD AUTO: 152 10E9/L (ref 150–450)
POTASSIUM SERPL-SCNC: 3.3 MMOL/L (ref 3.4–5.3)
RBC # BLD AUTO: 2.81 10E12/L (ref 3.8–5.2)
RBC #/AREA URNS AUTO: >182 /HPF (ref 0–2)
SODIUM SERPL-SCNC: 138 MMOL/L (ref 133–144)
SOURCE: ABNORMAL
SP GR UR STRIP: 1.02 (ref 1–1.03)
SQUAMOUS #/AREA URNS AUTO: 4 /HPF (ref 0–1)
UROBILINOGEN UR STRIP-MCNC: NORMAL MG/DL (ref 0–2)
WBC # BLD AUTO: 27.8 10E9/L (ref 4–11)
WBC #/AREA URNS AUTO: >182 /HPF (ref 0–5)
WBC CLUMPS #/AREA URNS HPF: PRESENT /HPF

## 2020-10-31 PROCEDURE — 258N000003 HC RX IP 258 OP 636: Performed by: HOSPITALIST

## 2020-10-31 PROCEDURE — 83605 ASSAY OF LACTIC ACID: CPT | Performed by: HOSPITALIST

## 2020-10-31 PROCEDURE — 120N000004 HC R&B MS OVERFLOW

## 2020-10-31 PROCEDURE — 250N000011 HC RX IP 250 OP 636: Performed by: HOSPITALIST

## 2020-10-31 PROCEDURE — 80048 BASIC METABOLIC PNL TOTAL CA: CPT | Performed by: UROLOGY

## 2020-10-31 PROCEDURE — U0003 INFECTIOUS AGENT DETECTION BY NUCLEIC ACID (DNA OR RNA); SEVERE ACUTE RESPIRATORY SYNDROME CORONAVIRUS 2 (SARS-COV-2) (CORONAVIRUS DISEASE [COVID-19]), AMPLIFIED PROBE TECHNIQUE, MAKING USE OF HIGH THROUGHPUT TECHNOLOGIES AS DESCRIBED BY CMS-2020-01-R: HCPCS | Performed by: HOSPITALIST

## 2020-10-31 PROCEDURE — 85027 COMPLETE CBC AUTOMATED: CPT | Performed by: UROLOGY

## 2020-10-31 PROCEDURE — 71045 X-RAY EXAM CHEST 1 VIEW: CPT

## 2020-10-31 PROCEDURE — 99231 SBSQ HOSP IP/OBS SF/LOW 25: CPT | Performed by: UROLOGY

## 2020-10-31 PROCEDURE — 87086 URINE CULTURE/COLONY COUNT: CPT | Performed by: HOSPITALIST

## 2020-10-31 PROCEDURE — 36415 COLL VENOUS BLD VENIPUNCTURE: CPT | Performed by: UROLOGY

## 2020-10-31 PROCEDURE — 250N000011 HC RX IP 250 OP 636: Performed by: UROLOGY

## 2020-10-31 PROCEDURE — 36415 COLL VENOUS BLD VENIPUNCTURE: CPT | Performed by: HOSPITALIST

## 2020-10-31 PROCEDURE — 87106 FUNGI IDENTIFICATION YEAST: CPT | Performed by: HOSPITALIST

## 2020-10-31 PROCEDURE — 87088 URINE BACTERIA CULTURE: CPT | Performed by: HOSPITALIST

## 2020-10-31 PROCEDURE — 81001 URINALYSIS AUTO W/SCOPE: CPT | Performed by: HOSPITALIST

## 2020-10-31 PROCEDURE — 250N000013 HC RX MED GY IP 250 OP 250 PS 637: Performed by: UROLOGY

## 2020-10-31 PROCEDURE — 99207 PR CONSULT E&M CHANGED TO SUBSEQUENT LEVEL: CPT | Performed by: HOSPITALIST

## 2020-10-31 PROCEDURE — 99232 SBSQ HOSP IP/OBS MODERATE 35: CPT | Performed by: HOSPITALIST

## 2020-10-31 PROCEDURE — 87040 BLOOD CULTURE FOR BACTERIA: CPT | Performed by: HOSPITALIST

## 2020-10-31 PROCEDURE — 87181 SC STD AGAR DILUTION PER AGT: CPT | Performed by: HOSPITALIST

## 2020-10-31 RX ORDER — POLYETHYLENE GLYCOL 3350 17 G/17G
17 POWDER, FOR SOLUTION ORAL DAILY
Status: DISCONTINUED | OUTPATIENT
Start: 2020-10-31 | End: 2020-11-06 | Stop reason: HOSPADM

## 2020-10-31 RX ORDER — AMOXICILLIN 250 MG
2 CAPSULE ORAL 2 TIMES DAILY
Status: DISCONTINUED | OUTPATIENT
Start: 2020-10-31 | End: 2020-11-06 | Stop reason: HOSPADM

## 2020-10-31 RX ORDER — LINEZOLID 2 MG/ML
600 INJECTION, SOLUTION INTRAVENOUS EVERY 12 HOURS
Status: DISCONTINUED | OUTPATIENT
Start: 2020-10-31 | End: 2020-11-02

## 2020-10-31 RX ORDER — POLYETHYLENE GLYCOL 3350 17 G/17G
17 POWDER, FOR SOLUTION ORAL DAILY PRN
Status: DISCONTINUED | OUTPATIENT
Start: 2020-10-31 | End: 2020-11-01

## 2020-10-31 RX ORDER — AMOXICILLIN 250 MG
2 CAPSULE ORAL 2 TIMES DAILY PRN
Status: DISCONTINUED | OUTPATIENT
Start: 2020-10-31 | End: 2020-11-06 | Stop reason: HOSPADM

## 2020-10-31 RX ORDER — ACETAMINOPHEN 325 MG/1
650 TABLET ORAL EVERY 4 HOURS PRN
Status: DISCONTINUED | OUTPATIENT
Start: 2020-10-31 | End: 2020-11-06 | Stop reason: HOSPADM

## 2020-10-31 RX ORDER — SODIUM CHLORIDE AND POTASSIUM CHLORIDE 150; 900 MG/100ML; MG/100ML
INJECTION, SOLUTION INTRAVENOUS CONTINUOUS
Status: DISCONTINUED | OUTPATIENT
Start: 2020-10-31 | End: 2020-11-01

## 2020-10-31 RX ORDER — HEPARIN SODIUM 5000 [USP'U]/.5ML
5000 INJECTION, SOLUTION INTRAVENOUS; SUBCUTANEOUS EVERY 12 HOURS
Status: DISCONTINUED | OUTPATIENT
Start: 2020-10-31 | End: 2020-11-03

## 2020-10-31 RX ORDER — AMOXICILLIN 250 MG
1 CAPSULE ORAL 2 TIMES DAILY
Status: DISCONTINUED | OUTPATIENT
Start: 2020-10-31 | End: 2020-11-06 | Stop reason: HOSPADM

## 2020-10-31 RX ORDER — HEPARIN SODIUM 5000 [USP'U]/.5ML
5000 INJECTION, SOLUTION INTRAVENOUS; SUBCUTANEOUS EVERY 12 HOURS
Status: DISCONTINUED | OUTPATIENT
Start: 2020-10-31 | End: 2020-10-31

## 2020-10-31 RX ORDER — CIPROFLOXACIN 2 MG/ML
400 INJECTION, SOLUTION INTRAVENOUS EVERY 24 HOURS
Status: DISCONTINUED | OUTPATIENT
Start: 2020-10-31 | End: 2020-11-02

## 2020-10-31 RX ORDER — LIDOCAINE 40 MG/G
CREAM TOPICAL
Status: DISCONTINUED | OUTPATIENT
Start: 2020-10-31 | End: 2020-11-06 | Stop reason: HOSPADM

## 2020-10-31 RX ORDER — AMOXICILLIN 250 MG
1 CAPSULE ORAL 2 TIMES DAILY PRN
Status: DISCONTINUED | OUTPATIENT
Start: 2020-10-31 | End: 2020-11-06 | Stop reason: HOSPADM

## 2020-10-31 RX ADMIN — HEPARIN SODIUM 5000 UNITS: 10000 INJECTION, SOLUTION INTRAVENOUS; SUBCUTANEOUS at 12:09

## 2020-10-31 RX ADMIN — SODIUM CHLORIDE, POTASSIUM CHLORIDE, SODIUM LACTATE AND CALCIUM CHLORIDE 500 ML: 600; 310; 30; 20 INJECTION, SOLUTION INTRAVENOUS at 09:00

## 2020-10-31 RX ADMIN — POTASSIUM CHLORIDE AND SODIUM CHLORIDE: 900; 150 INJECTION, SOLUTION INTRAVENOUS at 12:00

## 2020-10-31 RX ADMIN — LINEZOLID 600 MG: 600 INJECTION, SOLUTION INTRAVENOUS at 12:03

## 2020-10-31 RX ADMIN — ACETAMINOPHEN 500 MG: 500 TABLET, FILM COATED ORAL at 02:30

## 2020-10-31 RX ADMIN — ACETAMINOPHEN 500 MG: 500 TABLET, FILM COATED ORAL at 23:35

## 2020-10-31 RX ADMIN — CALCIUM 500 MG: 500 TABLET ORAL at 21:39

## 2020-10-31 RX ADMIN — LINEZOLID 600 MG: 600 INJECTION, SOLUTION INTRAVENOUS at 22:19

## 2020-10-31 RX ADMIN — OMEPRAZOLE 20 MG: 20 CAPSULE, DELAYED RELEASE ORAL at 12:00

## 2020-10-31 RX ADMIN — ACETAMINOPHEN 500 MG: 500 TABLET, FILM COATED ORAL at 12:00

## 2020-10-31 RX ADMIN — ACETAMINOPHEN 500 MG: 500 TABLET, FILM COATED ORAL at 18:51

## 2020-10-31 RX ADMIN — HEPARIN SODIUM 5000 UNITS: 10000 INJECTION, SOLUTION INTRAVENOUS; SUBCUTANEOUS at 22:12

## 2020-10-31 RX ADMIN — GABAPENTIN 200 MG: 100 CAPSULE ORAL at 21:38

## 2020-10-31 RX ADMIN — CALCIUM 500 MG: 500 TABLET ORAL at 12:00

## 2020-10-31 RX ADMIN — Medication 2.5 MG: at 21:39

## 2020-10-31 RX ADMIN — CIPROFLOXACIN 400 MG: 2 INJECTION, SOLUTION INTRAVENOUS at 14:00

## 2020-10-31 RX ADMIN — SODIUM CHLORIDE, POTASSIUM CHLORIDE, SODIUM LACTATE AND CALCIUM CHLORIDE 500 ML: 600; 310; 30; 20 INJECTION, SOLUTION INTRAVENOUS at 09:30

## 2020-10-31 RX ADMIN — GABAPENTIN 200 MG: 100 CAPSULE ORAL at 12:00

## 2020-10-31 NOTE — PLAN OF CARE
See flowsheets for vital signs and assessments.    Pertinent Assessments: C/O lower back pain, given tylenol once which was effective. Almanza in place, urine dark red. New PIV placed and saline locked.     Treatment Plan: Pain management    Discharge: Possibly today    Bedside RN: Renee Glez

## 2020-10-31 NOTE — PROGRESS NOTES
"VSS, on 2L of O2 sating in lower 90s, afebrile. Has not gotten out of bed, but moves well in bed independently. A&O. Almanza catheter in place, urine dark red. Arrived to the unit in a lot of pain, complained of lower bilateral back pain, charge nurse paged for low dose of IV pain med, pt has low dose of scheduled oxy. Pt also very nauseous when arrived to the unit, vomited x1. Charge nurse also paged for IV antiemetic. Nausea later subsided with no intervention, scheduled oxy given by LPN. Writer was going to give pt low dose of IV dilaudid, 0.2mg however, no IV access. Heating pad in place. Offered to assist pt in putting on a gown as she is still wearing the jose hugger gown and pt refused \"I don't want to bother with it.\"    "

## 2020-10-31 NOTE — PLAN OF CARE
Patient is A&Ox3 with some confusion to situation. Denies generalized pain/ chest pain, or nausea. Patient was noted as hypotensive and very sleepy this am. Provider notified, bolus administered and vitals have stabilized. Patient denies dizziness or lightheadedness. Fluids infusing now per orders, IV site is clean, dry, and intact. Order to advance diet as tolerated. Urine sample obtained and sent to lab. Almanza catheter in place, urine is draining, noted as red with mucus threads present. Frequent checks and monitoring of vital signs initiated.

## 2020-10-31 NOTE — PROVIDER NOTIFICATION
BP remains low 70-80's systolic. Provider notified. Verbal order for additional bolus. Infusing now.

## 2020-10-31 NOTE — PROGRESS NOTES
Urology    Admitted with urinary retention, hypotension after procedure yesterday  Blood Cx drawn    Urine currently linda with sediment  WBC 27.8 but afebrile    A/P: Linezolid and cipro  Ambulate and advance diet today  Will leave in vallecillo another 1-2 days  Appreciate help from hospitalist

## 2020-10-31 NOTE — OR NURSING
Patient was only able to void scant amount of urine.  Was stating she was very uncomfortable with continuous moaning.  Placed vallecillo with 450mL out.  Dr. Christianson states patient may go to assisted living with catheter and have nursing staff remove it in the am.

## 2020-10-31 NOTE — PROVIDER NOTIFICATION
Writer was administering patients am medications. Patient was lethargic. BP soft this am. Vitals rechecked, BP 70/30's. MD notified. Verbal order for 500 LR bolus. Bolus infusing. Patient placed in reverse trendelenburg. Surgeon paged and updated. Am meds held. Primary nurse notified. Will monitor patient closely.

## 2020-10-31 NOTE — CONSULTS
[facility]    Medicine Progress Note - Hospitalist Service       Date of Admission:  10/30/2020  Assessment & Plan       Daya Ignacio is a 88 year old female with past medical history of hypertension, chronic anemia, lumbar compression fractures with chronic back pain, multiple hospitalizations UTI (VRE, ESBL), history of C. Difficile, diverticular GI bleed, CAD (STEMI 5/2017), CHF (with preserved EF), depression, neuropathy, hospitalization in May 2020 with obstructing stone, UTI with left stent placement/lithotripsy. Admitted 10/30 for delayed stent removal (was lost to follow-up). This morning was hypotensive: hospitalist service consulted    Hypotension (in the setting of baseline HTN)    - patient was hypertensive yesterday and received: carvedilol 25mg at 2140, clonidine 0.1mg at 2140, isosorbide 10mg at 2240, and IV labetalol 10mg at 1745, 1900, 1930, and 2015)    - receiving second bolus and now sBP in the 90's (from 70's)    - patient has been responsive with mild lightheadedness during this episode    - there is also concern for infection: urine looks dirty, has elevated WBC but no fever    - will obtain STAT UA with culture and blood cultures    - holding all pta BP meds (carvedilol, imdur, conidine, lisinopril)    Possible UTI with sepsis    - as above    - has grown Citrobacter (>100K), Proteus 10-50K), VRE 10-50K) in May     - currently patient is on oral keflex and cipro    - will discontinue keflex and continue cipro    - will add linezolid for possible VRE    - will narrow antibiotics once urine cultures return    - STAT lactic acid    Acute renal failure    - Cr 1.2 today    - hold pta lisinopril    - cont IVF and re-check in am    POD #1 L ureteral stent exchange/lithotripsy    - vallecillo as per Urology    Hypokalemia    - after bolus, start NS with KCL    History CAD with STEMI in 5/2017    - holding Imdur and carvedilol    - not on asa as outpt    Depression   - holding pta  sertraline (interaction with sertraline)    Chronic pain    - cont PT oxy    GERD    - cont pta PPI     Full Code changed DNR/DNI: confirmed with patient and son  Admit to inpatient for hypotension, possible sepsis     Diet: Advance Diet as Tolerated: Full Liquid Diet    DVT Prophylaxis: Heparin subcutaneous (no lovenox due to kidney function)  Almanza Catheter: in place, indication: /GI/GYN Pelvic Procedure  Code Status: Full Code           Disposition Plan   Expected discharge: 1-2 days, recommended to prior living arrangement once SIRS/Sepsis treated.  Entered: Juliano Mckeon MD 10/31/2020, 9:56 AM       The patient's care was discussed with the Bedside Nurse, Patient, Patient's Family and Urology Consultant.    Juliano Mckeon MD  Hospitalist Service  [facility]  Contact information available via [facility] Paging/Directory    ______________________________________________________________________    Interval History   Called into patient room for sBP in the 70's. Patient respobsive and awake. Does state she feels lightheaded. No chest pain or sob. Patient seen multiple times over the past 1 hour. sBP now I nthe 90's    Data reviewed today: I reviewed all medications, new labs and imaging results over the last 24 hours. I personally reviewed no images or EKG's today.    Physical Exam   Vital Signs: Temp: 98.4  F (36.9  C) Temp src: Oral BP: (!) 84/41 Pulse: 71   Resp: 16 SpO2: 96 % O2 Device: Nasal cannula Oxygen Delivery: 2 LPM  Weight: 127 lbs 0 oz  Constitutional: awake, alert, cooperative, no apparent distress, and appears stated age  Eyes: Lids and lashes normal, pupils equal, round and reactive to light, extra ocular muscles intact, sclera clear, conjunctiva normal  ENT: Normocephalic, without obvious abnormality, atraumatic, sinuses nontender on palpation, external ears without lesions, oral pharynx with moist mucous membranes, tonsils without erythema or exudates, gums normal and good  dentition.  Respiratory: No increased work of breathing, good air exchange, clear to auscultation bilaterally, no crackles or wheezing  Cardiovascular: Normal apical impulse, regular rate and rhythm, normal S1 and S2, no S3 or S4, and no murmur noted  GI: No scars, normal bowel sounds, soft, non-distended, non-tender, no masses palpated, no hepatosplenomegally  Skin: no bruising or bleeding  Musculoskeletal: no lower extremity pitting edema present  Urine dark with sediment    Data   Recent Labs   Lab 10/31/20  0522   WBC 27.8*   HGB 9.1*            POTASSIUM 3.3*   CHLORIDE 104   CO2 25   BUN 20   CR 1.20*   ANIONGAP 9   DAVE 8.4*   GLC 93     Recent Results (from the past 24 hour(s))   XR Surgery BORIS L/T 5 Min Fluoro w Stills    Narrative    This exam was marked as non-reportable because it will not be read by a   radiologist or a [facility] non-radiologist provider.

## 2020-11-01 LAB
ANION GAP SERPL CALCULATED.3IONS-SCNC: 6 MMOL/L (ref 3–14)
ANISOCYTOSIS BLD QL SMEAR: SLIGHT
BASOPHILS # BLD AUTO: 0 10E9/L (ref 0–0.2)
BASOPHILS NFR BLD AUTO: 0 %
BUN SERPL-MCNC: 24 MG/DL (ref 7–30)
CALCIUM SERPL-MCNC: 8.4 MG/DL (ref 8.5–10.1)
CHLORIDE SERPL-SCNC: 105 MMOL/L (ref 94–109)
CO2 SERPL-SCNC: 24 MMOL/L (ref 20–32)
CREAT SERPL-MCNC: 0.94 MG/DL (ref 0.52–1.04)
DIFFERENTIAL METHOD BLD: ABNORMAL
EOSINOPHIL # BLD AUTO: 0 10E9/L (ref 0–0.7)
EOSINOPHIL NFR BLD AUTO: 0 %
ERYTHROCYTE [DISTWIDTH] IN BLOOD BY AUTOMATED COUNT: 15.4 % (ref 10–15)
GFR SERPL CREATININE-BSD FRML MDRD: 53 ML/MIN/{1.73_M2}
GLUCOSE SERPL-MCNC: 93 MG/DL (ref 70–99)
HCT VFR BLD AUTO: 30.1 % (ref 35–47)
HGB BLD-MCNC: 9.6 G/DL (ref 11.7–15.7)
LABORATORY COMMENT REPORT: NORMAL
LACTATE BLD-SCNC: 0.8 MMOL/L (ref 0.7–2)
LYMPHOCYTES # BLD AUTO: 0.4 10E9/L (ref 0.8–5.3)
LYMPHOCYTES NFR BLD AUTO: 5 %
MACROCYTES BLD QL SMEAR: PRESENT
MCH RBC QN AUTO: 32 PG (ref 26.5–33)
MCHC RBC AUTO-ENTMCNC: 31.9 G/DL (ref 31.5–36.5)
MCV RBC AUTO: 100 FL (ref 78–100)
MONOCYTES # BLD AUTO: 0.1 10E9/L (ref 0–1.3)
MONOCYTES NFR BLD AUTO: 1 %
NEUTROPHILS # BLD AUTO: 8 10E9/L (ref 1.6–8.3)
NEUTROPHILS NFR BLD AUTO: 94 %
NRBC # BLD AUTO: 0.1 10*3/UL
NRBC BLD AUTO-RTO: 1 /100
OVALOCYTES BLD QL SMEAR: SLIGHT
PLATELET # BLD AUTO: 96 10E9/L (ref 150–450)
PLATELET # BLD EST: ABNORMAL 10*3/UL
POTASSIUM SERPL-SCNC: 4.3 MMOL/L (ref 3.4–5.3)
RBC # BLD AUTO: 3 10E12/L (ref 3.8–5.2)
SARS-COV-2 RNA SPEC QL NAA+PROBE: NEGATIVE
SARS-COV-2 RNA SPEC QL NAA+PROBE: NORMAL
SODIUM SERPL-SCNC: 135 MMOL/L (ref 133–144)
SPECIMEN SOURCE: NORMAL
SPECIMEN SOURCE: NORMAL
WBC # BLD AUTO: 8.5 10E9/L (ref 4–11)

## 2020-11-01 PROCEDURE — 250N000011 HC RX IP 250 OP 636: Performed by: HOSPITALIST

## 2020-11-01 PROCEDURE — 99232 SBSQ HOSP IP/OBS MODERATE 35: CPT | Performed by: HOSPITALIST

## 2020-11-01 PROCEDURE — 99231 SBSQ HOSP IP/OBS SF/LOW 25: CPT | Performed by: UROLOGY

## 2020-11-01 PROCEDURE — 83605 ASSAY OF LACTIC ACID: CPT | Performed by: HOSPITALIST

## 2020-11-01 PROCEDURE — 250N000011 HC RX IP 250 OP 636: Performed by: UROLOGY

## 2020-11-01 PROCEDURE — 250N000013 HC RX MED GY IP 250 OP 250 PS 637: Performed by: UROLOGY

## 2020-11-01 PROCEDURE — 85025 COMPLETE CBC W/AUTO DIFF WBC: CPT | Performed by: HOSPITALIST

## 2020-11-01 PROCEDURE — 99207 PR CDG-MDM COMPONENT: MEETS LOW - DOWN CODED: CPT | Performed by: HOSPITALIST

## 2020-11-01 PROCEDURE — 80048 BASIC METABOLIC PNL TOTAL CA: CPT | Performed by: HOSPITALIST

## 2020-11-01 PROCEDURE — 258N000003 HC RX IP 258 OP 636: Performed by: UROLOGY

## 2020-11-01 PROCEDURE — 250N000013 HC RX MED GY IP 250 OP 250 PS 637: Performed by: HOSPITALIST

## 2020-11-01 PROCEDURE — 120N000004 HC R&B MS OVERFLOW

## 2020-11-01 PROCEDURE — 36415 COLL VENOUS BLD VENIPUNCTURE: CPT | Performed by: HOSPITALIST

## 2020-11-01 RX ORDER — OXYCODONE HYDROCHLORIDE 5 MG/1
2.5 TABLET ORAL 2 TIMES DAILY PRN
Status: ON HOLD | COMMUNITY
End: 2020-11-03

## 2020-11-01 RX ADMIN — Medication 2.5 MG: at 21:14

## 2020-11-01 RX ADMIN — LINEZOLID 600 MG: 600 INJECTION, SOLUTION INTRAVENOUS at 21:58

## 2020-11-01 RX ADMIN — POTASSIUM CHLORIDE AND SODIUM CHLORIDE: 900; 150 INJECTION, SOLUTION INTRAVENOUS at 00:50

## 2020-11-01 RX ADMIN — Medication 2.5 MG: at 14:01

## 2020-11-01 RX ADMIN — GABAPENTIN 200 MG: 100 CAPSULE ORAL at 21:14

## 2020-11-01 RX ADMIN — GABAPENTIN 200 MG: 100 CAPSULE ORAL at 08:49

## 2020-11-01 RX ADMIN — DOCUSATE SODIUM 50 MG AND SENNOSIDES 8.6 MG 1 TABLET: 8.6; 5 TABLET, FILM COATED ORAL at 21:14

## 2020-11-01 RX ADMIN — HEPARIN SODIUM 5000 UNITS: 10000 INJECTION, SOLUTION INTRAVENOUS; SUBCUTANEOUS at 10:33

## 2020-11-01 RX ADMIN — ACETAMINOPHEN 500 MG: 500 TABLET, FILM COATED ORAL at 12:19

## 2020-11-01 RX ADMIN — LINEZOLID 600 MG: 600 INJECTION, SOLUTION INTRAVENOUS at 10:34

## 2020-11-01 RX ADMIN — OMEPRAZOLE 20 MG: 20 CAPSULE, DELAYED RELEASE ORAL at 08:48

## 2020-11-01 RX ADMIN — ACETAMINOPHEN 500 MG: 500 TABLET, FILM COATED ORAL at 23:39

## 2020-11-01 RX ADMIN — CALCIUM 500 MG: 500 TABLET ORAL at 21:14

## 2020-11-01 RX ADMIN — ACETAMINOPHEN 500 MG: 500 TABLET, FILM COATED ORAL at 17:56

## 2020-11-01 RX ADMIN — POTASSIUM CHLORIDE: 2 INJECTION, SOLUTION, CONCENTRATE INTRAVENOUS at 10:11

## 2020-11-01 RX ADMIN — CALCIUM 500 MG: 500 TABLET ORAL at 08:49

## 2020-11-01 RX ADMIN — HEPARIN SODIUM 5000 UNITS: 10000 INJECTION, SOLUTION INTRAVENOUS; SUBCUTANEOUS at 21:58

## 2020-11-01 RX ADMIN — Medication 2.5 MG: at 08:48

## 2020-11-01 NOTE — PROGRESS NOTES
"[facility]    Medicine Progress Note - Hospitalist Service       Date of Admission:  10/30/2020  Assessment & Plan         Daya Ignacio is a 88 year old female with past medical history of hypertension, chronic anemia, lumbar compression fractures with chronic back pain, multiple hospitalizations UTI (VRE, ESBL), history of C. Difficile, diverticular GI bleed, CAD (STEMI 5/2017), CHF (with preserved EF), depression, neuropathy, hospitalization in May 2020 with obstructing stone, UTI with left stent placement/lithotripsy. Admitted 10/30 for delayed stent removal (was lost to follow-up). Hospitalist service consulted for hypotension on 10/31 am.     Hypotension (in the setting of baseline HTN)    - seems to have resolved    - patient was hypertensive on 10/30 and received: carvedilol 25mg at 2140, clonidine 0.1mg at 2140, isosorbide 10mg at 2240, and IV labetalol 10mg at 1745, 1900, 1930, and 2015)    - then on am of 10/31 was hypotensive into the 70's    - responded to IVF    - BP now 118/44    - there was also concern for infection: urine looks dirty, had elevated WBC but no fever (see below)    - still holding all pta BP meds (carvedilol, imdur, conidine, lisinopril)    Possible UTI with sepsis     - has grown Citrobacter (>100K), Proteus 10-50K), VRE 10-50K) in May     - was on oral keflex and cipro    - discontinued keflex and continued cipro    - added linezolid for possible VRE    - will narrow antibiotics once urine cultures return (pending    Question of aspiration last evening    - as per nursing patient \"choked\" while eating    - patient states it was the green beans    - typically does not have issues eating    - CXR last night was negative    - no issues today    - titrating off oxygen    Acute renal failure    - Cr was 1.2     - holding pta lisinopril    - resolved    - can discontinue IVF as patient is eating and drinking well    POD #2 L ureteral stent exchange/lithotripsy    - " vallecillo as per Urology- discontinue tomorrow    Hypokalemia    - resolved    History CAD with STEMI in 5/2017    - holding Imdur and carvedilol    - not on asa as outpt    Depression   - holding pta sertraline (interaction with linezolid)    Chronic pain    - cont PT oxy    GERD    - cont pta PPI    Will increase ambulation today and will assess if will need PT     Sat with son and answered questions for 20 mins    Full Code changed DNR/DNI: confirmed with patient and son  Admit to inpatient for hypotension, possible sepsis     Diet: Combination Diet Regular Diet Adult    DVT Prophylaxis: heparin (had elevated Cr). Can change to lovenox if has prolonged stay  Vallecillo Catheter: in place, indication: /GI/GYN Pelvic Procedure  Code Status: No CPR- Do NOT Intubate           Disposition Plan   Expected discharge: 1-2 days, recommended to prior living arrangement once SIRS/Sepsis treated.  Entered: Juliano Mckeon MD 11/01/2020, 10:18 AM       The patient's care was discussed with the Bedside Nurse, Patient, Patient's Family and Urology Consultant.    Juliano Mckeon MD  Hospitalist Service  [facility]  Contact information available via [facility] Paging/Directory    ______________________________________________________________________    Interval History   Patient doing well today. BP stable. Awake and alert. No chest pain, sob. Had some kind of choking episode last night (green beans). I personally got the patient out of bed and to a chair for breakfast. She is having no issues eating today.    Data reviewed today: I reviewed all medications, new labs and imaging results over the last 24 hours. I personally reviewed no images or EKG's today.    Physical Exam   Vital Signs: Temp: 97.1  F (36.2  C) Temp src: Oral BP: 118/44 Pulse: 81   Resp: 18 SpO2: 98 % O2 Device: Oxymask Oxygen Delivery: 2 LPM  Weight: 127 lbs 0 oz  Constitutional: awake, alert, cooperative, no apparent distress, and appears stated  age  Eyes: Lids and lashes normal, pupils equal, round and reactive to light, extra ocular muscles intact, sclera clear, conjunctiva normal  ENT: Normocephalic, without obvious abnormality, atraumatic, sinuses nontender on palpation, external ears without lesions, oral pharynx with moist mucous membranes, tonsils without erythema or exudates, gums normal and good dentition.  Respiratory: No increased work of breathing, good air exchange, clear to auscultation bilaterally, no crackles or wheezing  Cardiovascular: Normal apical impulse, regular rate and rhythm, normal S1 and S2, no S3 or S4, and no murmur noted  GI: No scars, normal bowel sounds, soft, non-distended, non-tender, no masses palpated, no hepatosplenomegally  Skin: no bruising or bleeding  Musculoskeletal: no lower extremity pitting edema present  Urine lighter today    Data   Recent Labs   Lab 11/01/20  0632 10/31/20  0522   WBC 8.5 27.8*   HGB 9.6* 9.1*    100   PLT 96* 152    138   POTASSIUM 4.3 3.3*   CHLORIDE 105 104   CO2 24 25   BUN 24 20   CR 0.94 1.20*   ANIONGAP 6 9   DAVE 8.4* 8.4*   GLC 93 93     Recent Results (from the past 24 hour(s))   XR Chest Port 1 View    Narrative    EXAM: XR CHEST PORT 1 VW  LOCATION: [facility]  DATE/TIME: 10/31/2020 6:50 PM    INDICATION: Shortness of breath. Suspected aspiration.  COMPARISON: 05/06/2020.      Impression    IMPRESSION: Minimal change. There is some linear density extending from left hilum most suggestive of atelectasis or scar. No new focal consolidation or pleural effusion. Heart size normal.

## 2020-11-01 NOTE — PROGRESS NOTES
Urology    Hypoxic episode last night  On cipro ansd linezolid  Afebrile  WBC now normal  Urine is clear    A/P: Doing well  Likely remove vallecillo tomorrow AM

## 2020-11-01 NOTE — PLAN OF CARE
"Pt up in chair for dinner, moved Ax2 with walker and gait belt. Very weak, unsteady. Left room briefly and when I returned pt stated she choked on her dinner, pt was on RA before getting up for dinner pt now on 3L via oxymask, wheezy. Provider notified- chest xray ordered. Pt possibly aspirated. Pt drinking and swallowing pills fine, no coughing. /54 HR 88 RR 32, 97% on 3L of O2, afebrile. Pt preferred oxymask over nasal cannula \"It feels better.\" Disoriented to situation and time otherwise alert. On Keflex and Cipro for possible UTI, linezolid added today. Blood and urine cultures still pending. Complained of feet pain- scheduled meds given. Heating pad in place on her back. Fluids with potassium running at 100mL/hr. Almanza in place, brown urine. Recheck labs in am. Son Gael updated via phone.       "

## 2020-11-01 NOTE — PLAN OF CARE
See flowsheets for vital signs and assessments.    Pertinent Assessments: VSS. Remains on 2LPM oxymask with O2 sats mid-high 90s. Denies pain, SOB, nausea. LS diminished but clear. Almanza output 950mL.    Treatment Plan: IVF, cipro, linezolid    Discharge: 1-2 days per MD note.    Bedside RN: Renee Glez

## 2020-11-01 NOTE — CONSULTS
Care Management Initial Consult    General Information  Assessment completed with:: Patient, Children, Patient and son, Gael  Type of CM/SW Visit: Initial Assessment  Primary Care Provider verified and updated as needed?: Yes  Readmission Within the Last 30 Days: no previous admission in last 30 days        Advance Care Planning:            Communication Assessment  Patient's communication style: spoken language (English or Bilingual)         Cognitive  Cognitive/Neuro/Behavioral: .WDL except, orientation  Level of Consciousness: alert  Arousal Level: opens eyes spontaneously, arouses to voice  Orientation: disoriented to, situation  Mood/Behavior: cooperative, calm  Best Language: 0 - No aphasia  Speech: clear    Living Environment:   People in home: facility resident     Current living Arrangements: assisted living  Name of Facility: [facility]       Family/Social Support:  Care provided by: (Facility)  Provides care for: no one  Marital Status:   Who is your support system?: Children(sonGael)     Description of Support System: Supportive, Involved  Description of Support System: Supportive, Involved           Description of Support System: Supportive, Involved       Current Resources:   Skilled Home Care Services:    Community Resources:    Equipment currently used at home: walker, rolling  Supplies currently used at home:  4 ww with seat     Financial/Environmental Concerns: No concerns identified          Lifestyle & Psychosocial Needs:        Socioeconomic History     Marital status:      Spouse name: Not on file     Number of children: Not on file     Years of education: Not on file     Highest education level: Not on file     Tobacco Use     Smoking status: Former Smoker     Types: Cigarettes     Smokeless tobacco: Never Used   Substance and Sexual Activity     Alcohol use: No     Drug use: No     Sexual activity: Never       Functional Status:  Prior to admission  patient needed assistance: Patient was living at [facility]. Son states she recently moved to [facility] [place] in [place] for increased services. At baseline, patient uses 4ww with seat.       Mental Health Status:  WDL                        Values/Beliefs:  Spiritual, Cultural Beliefs, Jew Practices, Values that affect care:                 Additional Information:  CTS following for discharge planning. Met with patient and son, Gael. They verified patient is living at [facility] in [place]. Facility provides support with most care needs. Son anticipates patient will return to [facility] when medically stable. Son give [facility] phone contact # [phone].     I called [facility] [place] to speak with nursing, confirm services and discuss plan for return. Nursing staff currently not present. I left a message for Rene Sainz Clinical Director.     CTS will f/u with facility on Monday.       Christina Watts RN BSN PHN CCM   Inpatient Care Coordination   [facility]   [phone]

## 2020-11-01 NOTE — PROGRESS NOTES
Brief Hospitalist Cross cover note    Reason for the call : Hypoxia, tachypnea and concern for aspiration    Reason for admission : Urosepsis, acute urinary retention    CurrentSignificant findings or change:       Vital signs : /54 (BP Location: Left arm)   Pulse 88   Temp 95.5  F (35.3  C) (Oral)   Resp (!) 32   Ht 1.524 m (5')   Wt 57.6 kg (127 lb)   SpO2 97%   BMI 24.80 kg/m       Labs and Diagnostic Studies   o Significant leukocytosis  o Chest x-ray showed no new infiltrate    Actions taken:    EMR reviewed     Cares discussed with bedside nurse    Assessment: Acute hypoxic respiratory failure, suspect aspiration pneumonia    Recommendation/Plan:    Continue supplemental oxygen    Continue current antibiotic therapy with Cipro and Zyvox    Monitor vital signs    Consider changing to inpatient status if patient condition declines    Critical Care time: none

## 2020-11-01 NOTE — PLAN OF CARE
Pt. Is A&Ox3 with some forgetfulness and confusion to situation. She reports chronic back pain 7/10 but appears comfortable sitting up in chair. Pain management provided per MAR. Patient was on 2L O2 this am and is now stable on RA at 95%. Patient is tolerating regular diet, able to feed self while up in chair. Tolerated walk this afternoon, approx. 30 steps with standby assist of 2. Almanza in place, urine is draining and is yellow and clear. IV site is clean, dry, and intact. Infusing without complications. VSS. Denies dizziness or lightheadedness.     BP (!) 151/58 (BP Location: Left arm)   Pulse 85   Temp 96.5  F (35.8  C) (Oral)   Resp 16   Ht 1.524 m (5')   Wt 57.6 kg (127 lb)   SpO2 93%   BMI 24.80 kg/m

## 2020-11-01 NOTE — PHARMACY-ADMISSION MEDICATION HISTORY
Admission medication history interview status for this patient is complete. See [facility] admission navigator for allergy information, prior to admission medications and immunization status.     Medication history interview done via telephone during Covid-19 pandemic, indicate source(s): Patient  Medication history resources (including written lists, pill bottles, clinic record): SureScripts and Care Everywhere  Pharmacy: [facility]    Changes made to PTA medication list:  Added: none  Deleted: acetaminophen 500mg TID (scheduled), ondansetron ODT 4mg (from 2019), sertraline 100mg (DC'd while inpatient due to drug interaction)  Changed: diclofenac 1% gel 2g TID --> 2g TID PRN. Oxycodone 2.5mg TID --> 2.5mg BID PRN.    Actions taken by pharmacist (provider contacted, etc): RN did pre-admitting note, called pt to clarify a few medications.     Additional medication history information:None    Medication reconciliation/reorder completed by provider prior to medication history?  Y   (Y/N)       Prior to Admission medications    Medication Sig Last Dose Taking? Auth Provider   acetaminophen (TYLENOL) 500 MG tablet Take 500 mg by mouth 3 times daily as needed for mild pain 10/30/2020 at Unknown time Yes Unknown, Entered By History   amLODIPine (NORVASC) 10 MG tablet Take 10 mg by mouth daily 10/30/2020 at Unknown time Yes Unknown, Entered By History   Calcium Carbonate (CALCIUM 600 PO) Take 1 tablet by mouth 2 times daily  10/30/2020 at 0800 Yes Reported, Patient   carvedilol (COREG) 25 MG tablet Take 25 mg by mouth At Bedtime 10/29/2020 at Unknown time Yes Unknown, Entered By History   carvedilol (COREG) 25 MG tablet Take 12.5 mg by mouth every morning  10/30/2020 at 0800 Yes Reported, Patient   cephALEXin (KEFLEX) 250 MG capsule Take 250 mg by mouth daily 10/30/2020 at 0800 Yes Reported, Patient   Cholecalciferol (VITAMIN D3) 2000 UNITS CAPS Take 2,000 Units by mouth daily (with dinner) 10/30/2020 at [id number] Yes Unknown,  Entered By History   cloNIDine (CATAPRES) 0.1 MG tablet Take 0.1 mg by mouth 2 times daily 10/30/2020 at 0800 Yes Unknown, Entered By History   diclofenac (VOLTAREN) 1 % topical gel Place 2 g onto the skin 3 times daily as needed for moderate pain Apply to right knee 10/30/2020 at 0800 Yes Unknown, Entered By History   docusate sodium (COLACE) 100 MG capsule Take 1 capsule (100 mg) by mouth At Bedtime 10/29/2020 at Unknown time Yes Seema Quintero APRN CNP   gabapentin (NEURONTIN) 100 MG capsule Take 2 capsules (200 mg) by mouth 2 times daily 10/30/2020 at 0800 Yes Amanda Kenyon MD   isosorbide dinitrate (ISORDIL) 10 MG tablet Take 1 tablet (10 mg) by mouth 3 times daily 10/30/2020 at 0800 Yes Marcy Soares PA-C   lisinopril (ZESTRIL) 30 MG tablet Take 30 mg by mouth daily 10/30/2020 at 0800 Yes Unknown, Entered By History   Multiple Vitamins-Minerals (PRESERVISION AREDS) CAPS Take 1 capsule by mouth 2 times daily  10/30/2020 at 0800 Yes Unknown, Entered By History   omeprazole 20 MG tablet Take 20 mg by mouth daily  10/30/2020 at 0800 Yes Reported, Patient   oxyCODONE (ROXICODONE) 5 MG tablet Take 2.5 mg by mouth 2 times daily as needed for severe pain Past Week at Unknown time Yes Unknown, Entered By History   SENNA-docusate sodium (SENNA S) 8.6-50 MG tablet Take 1 tablet by mouth At Bedtime 10/30/2020 Yes Seema Quintero APRN CNP   vitamin C (ASCORBIC ACID) 500 MG tablet Take 500 mg by mouth daily at 4pm  10/30/2020 at 0800 Yes Reported, Patient   polyethylene glycol (MIRALAX/GLYCOLAX) packet Take 17 g by mouth daily as needed for constipation More than a month at Unknown time  Gisela Mehta PA-C

## 2020-11-02 ENCOUNTER — APPOINTMENT (OUTPATIENT)
Dept: CARDIOLOGY | Facility: CLINIC | Age: 85
DRG: 659 | End: 2020-11-02
Attending: INTERNAL MEDICINE
Payer: COMMERCIAL

## 2020-11-02 LAB
ANION GAP SERPL CALCULATED.3IONS-SCNC: 6 MMOL/L (ref 3–14)
BASOPHILS # BLD AUTO: 0 10E9/L (ref 0–0.2)
BASOPHILS NFR BLD AUTO: 0 %
BUN SERPL-MCNC: 11 MG/DL (ref 7–30)
CALCIUM SERPL-MCNC: 8.6 MG/DL (ref 8.5–10.1)
CHLORIDE SERPL-SCNC: 104 MMOL/L (ref 94–109)
CO2 SERPL-SCNC: 26 MMOL/L (ref 20–32)
COPATH REPORT: NORMAL
CREAT SERPL-MCNC: 0.54 MG/DL (ref 0.52–1.04)
DIFFERENTIAL METHOD BLD: ABNORMAL
EOSINOPHIL # BLD AUTO: 0.3 10E9/L (ref 0–0.7)
EOSINOPHIL NFR BLD AUTO: 4 %
ERYTHROCYTE [DISTWIDTH] IN BLOOD BY AUTOMATED COUNT: 15 % (ref 10–15)
GFR SERPL CREATININE-BSD FRML MDRD: 83 ML/MIN/{1.73_M2}
GLUCOSE SERPL-MCNC: 111 MG/DL (ref 70–99)
HCT VFR BLD AUTO: 31.3 % (ref 35–47)
HGB BLD-MCNC: 9.9 G/DL (ref 11.7–15.7)
LYMPHOCYTES # BLD AUTO: 0.4 10E9/L (ref 0.8–5.3)
LYMPHOCYTES NFR BLD AUTO: 6 %
MACROCYTES BLD QL SMEAR: PRESENT
MCH RBC QN AUTO: 31.8 PG (ref 26.5–33)
MCHC RBC AUTO-ENTMCNC: 31.6 G/DL (ref 31.5–36.5)
MCV RBC AUTO: 101 FL (ref 78–100)
MONOCYTES # BLD AUTO: 0.1 10E9/L (ref 0–1.3)
MONOCYTES NFR BLD AUTO: 2 %
NEUTROPHILS # BLD AUTO: 5.7 10E9/L (ref 1.6–8.3)
NEUTROPHILS NFR BLD AUTO: 88 %
OVALOCYTES BLD QL SMEAR: SLIGHT
PLATELET # BLD AUTO: 84 10E9/L (ref 150–450)
PLATELET # BLD EST: ABNORMAL 10*3/UL
POTASSIUM SERPL-SCNC: 4 MMOL/L (ref 3.4–5.3)
RBC # BLD AUTO: 3.11 10E12/L (ref 3.8–5.2)
SODIUM SERPL-SCNC: 136 MMOL/L (ref 133–144)
WBC # BLD AUTO: 6.5 10E9/L (ref 4–11)

## 2020-11-02 PROCEDURE — 258N000003 HC RX IP 258 OP 636: Performed by: INTERNAL MEDICINE

## 2020-11-02 PROCEDURE — 250N000011 HC RX IP 250 OP 636: Performed by: INTERNAL MEDICINE

## 2020-11-02 PROCEDURE — 250N000013 HC RX MED GY IP 250 OP 250 PS 637: Performed by: UROLOGY

## 2020-11-02 PROCEDURE — 99231 SBSQ HOSP IP/OBS SF/LOW 25: CPT | Performed by: UROLOGY

## 2020-11-02 PROCEDURE — 93306 TTE W/DOPPLER COMPLETE: CPT | Mod: 26 | Performed by: INTERNAL MEDICINE

## 2020-11-02 PROCEDURE — 250N000011 HC RX IP 250 OP 636: Performed by: UROLOGY

## 2020-11-02 PROCEDURE — 99233 SBSQ HOSP IP/OBS HIGH 50: CPT | Performed by: HOSPITALIST

## 2020-11-02 PROCEDURE — 93306 TTE W/DOPPLER COMPLETE: CPT

## 2020-11-02 PROCEDURE — 87040 BLOOD CULTURE FOR BACTERIA: CPT | Performed by: HOSPITALIST

## 2020-11-02 PROCEDURE — 120N000004 HC R&B MS OVERFLOW

## 2020-11-02 PROCEDURE — 80048 BASIC METABOLIC PNL TOTAL CA: CPT | Performed by: HOSPITALIST

## 2020-11-02 PROCEDURE — 250N000013 HC RX MED GY IP 250 OP 250 PS 637: Performed by: HOSPITALIST

## 2020-11-02 PROCEDURE — 36415 COLL VENOUS BLD VENIPUNCTURE: CPT | Performed by: HOSPITALIST

## 2020-11-02 PROCEDURE — 87449 NOS EACH ORGANISM AG IA: CPT | Performed by: HOSPITALIST

## 2020-11-02 PROCEDURE — 85025 COMPLETE CBC W/AUTO DIFF WBC: CPT | Performed by: HOSPITALIST

## 2020-11-02 PROCEDURE — 250N000011 HC RX IP 250 OP 636: Performed by: HOSPITALIST

## 2020-11-02 RX ORDER — AMLODIPINE BESYLATE 10 MG/1
10 TABLET ORAL DAILY
Status: DISCONTINUED | OUTPATIENT
Start: 2020-11-02 | End: 2020-11-06 | Stop reason: HOSPADM

## 2020-11-02 RX ORDER — CARVEDILOL 12.5 MG/1
12.5 TABLET ORAL EVERY MORNING
Status: DISCONTINUED | OUTPATIENT
Start: 2020-11-02 | End: 2020-11-06 | Stop reason: HOSPADM

## 2020-11-02 RX ADMIN — CARVEDILOL 25 MG: 25 TABLET, FILM COATED ORAL at 22:10

## 2020-11-02 RX ADMIN — CALCIUM 500 MG: 500 TABLET ORAL at 08:42

## 2020-11-02 RX ADMIN — CARVEDILOL 12.5 MG: 12.5 TABLET, FILM COATED ORAL at 10:45

## 2020-11-02 RX ADMIN — MICAFUNGIN SODIUM 100 MG: 50 INJECTION, POWDER, LYOPHILIZED, FOR SOLUTION INTRAVENOUS at 03:38

## 2020-11-02 RX ADMIN — ACETAMINOPHEN 500 MG: 500 TABLET, FILM COATED ORAL at 18:17

## 2020-11-02 RX ADMIN — Medication 2.5 MG: at 13:44

## 2020-11-02 RX ADMIN — GABAPENTIN 200 MG: 100 CAPSULE ORAL at 08:43

## 2020-11-02 RX ADMIN — OMEPRAZOLE 20 MG: 20 CAPSULE, DELAYED RELEASE ORAL at 08:42

## 2020-11-02 RX ADMIN — GABAPENTIN 200 MG: 100 CAPSULE ORAL at 20:19

## 2020-11-02 RX ADMIN — AMLODIPINE BESYLATE 10 MG: 10 TABLET ORAL at 10:45

## 2020-11-02 RX ADMIN — ACETAMINOPHEN 500 MG: 500 TABLET, FILM COATED ORAL at 23:59

## 2020-11-02 RX ADMIN — HEPARIN SODIUM 5000 UNITS: 10000 INJECTION, SOLUTION INTRAVENOUS; SUBCUTANEOUS at 10:45

## 2020-11-02 RX ADMIN — CALCIUM 500 MG: 500 TABLET ORAL at 20:18

## 2020-11-02 RX ADMIN — ACETAMINOPHEN 500 MG: 500 TABLET, FILM COATED ORAL at 13:44

## 2020-11-02 RX ADMIN — Medication 2.5 MG: at 20:19

## 2020-11-02 RX ADMIN — Medication 2.5 MG: at 08:41

## 2020-11-02 RX ADMIN — ONDANSETRON 4 MG: 4 TABLET, ORALLY DISINTEGRATING ORAL at 04:04

## 2020-11-02 RX ADMIN — HEPARIN SODIUM 5000 UNITS: 10000 INJECTION, SOLUTION INTRAVENOUS; SUBCUTANEOUS at 22:19

## 2020-11-02 NOTE — CONSULTS
Consult Date:  11/02/2020      INFECTIOUS DISEASE CONSULTATION      LOCATION:  Room 206, [facility]      REQUESTING PHYSICIAN:  Juliano Mckeon MD      IMPRESSION:   1.  A 89-year-old female, well known with recurrent UTIs, currently admitted with low blood pressure, longstanding stent in place, probable UTI although, of note, urine without major pathogens.  Stent has now been replaced.   2.  Candidemia at admission is current active infection, presumably from the urine, urine was not worked up, but had the usual jorge, which might be Candida, so will have them evaluate.   3.  History of recurrent UTIs with numerous resistant organisms, also chronic symptoms are likely not UTI related.   4.  History of C. diff in 2019, not since.   5.  VRE and ESBL historical infections including recent.   6.  Bilateral total hip arthroplasties.  No evidence of involvement so far.   7.  Mild renal dysfunction.   8.  NITROFURANTOIN, SULFA AND PENICILLIN ALLERGIES.      RECOMMENDATIONS:   1.  Agree with micafungin, serial blood cultures until clear.   2.  Watch for any secondary sites including and especially her hips or eyes.  Hopefully, will clear quickly and more hopefully will be a sensitive organism in which case fluconazole will be an acceptable agent.   3.  This current organism is just further evidence that prophylaxis is absolutely not indicated here.  Numerous resistant organisms being created, no likely value whatsoever.  Would not do prophylaxis in this patient.      HISTORY OF PRESENT ILLNESS:  This 89-year-old female is seen in consultation due to apparent recurrent UTI with presumed Candida involvement, now with candidemia.  She has some mild confusion, mild general achiness, but not particularly otherwise ill.  She has longstanding stent and it has been replaced.  Of note, her urine was abnormal, but culture with only less than 10,00 usual jorge, which was not worked up.  They are now working it up per my  request.      PAST MEDICAL HISTORY:  Bilateral hip replacements, neither are painful.  History of multiple recurrent UTIs, chronic urinary symptoms, has been back on prophylactic agents, but they are contraindicated here for multiple reasons including C. diff, numerous resistant organisms. lack of efficacy against any of her pathogens and not a good candidate for prophylaxis at any point.      SOCIAL AND FAMILY HISTORY:  No recent travel exposures.  No known ESBL and VRE.  No COVID-19 risk and negative.      MEDICATIONS:  As listed.      REVIEW OF SYSTEMS:  General malaise, achiness, but no specific pain site.  Had hypotension earlier and that has resolved.      PHYSICAL EXAMINATION:   GENERAL:  The patient appears her stated age.  She looks like her usual self; if any, looks younger than her stated age.  She is mildly confused.   HEENT:  No thrush or oropharyngeal lesions.  Pupils reactive.   NECK:  Supple and nontender.   HEART AND LUNGS:  Fairly unremarkable, slight murmur.   ABDOMEN:  Nontender, no flank tenderness.   EXTREMITIES:  Edema, no significant embolic lesions.      LABORATORY DATA:  Candida in the blood culture, species to follow.  Urine was abnormal.  Workup of the urine culture is pending.  Stent findings noted.      Thank you very much for the consultation.  I will follow the patient with you.         MINI CABELLO MD             D: 2020   T: 2020   MT: EDMOND      Name:     MAGALY MCDANIELS   MRN:      [id number]        Account:       [id number]   :      10/11/1930           Consult Date:  2020      Document: [id number]

## 2020-11-02 NOTE — PROGRESS NOTES
[facility] Urology Progress Note          Assessment and Plan:   Principal Problem:    Ureteral stone    Assessment: Fungemia    Plan: ID consult today, reviewed with Dr. Mckeon.  Appreciate wonderful care from the Hospitalist Service.   Discontinue Almanza.       Bonita Whitaker PA-C  [facility] Urology  [phone]               Interval History:   doing well; Vulvar pain has resolved.              Review of Systems:   The 5 point Review of Systems is negative other than noted in the HPI             Medications:     Current Facility-Administered Medications Ordered in Epic   Medication Dose Route Frequency Last Rate Last Dose     acetaminophen (TYLENOL) tablet 500 mg  500 mg Oral TID   500 mg at 11/01/20 2339     acetaminophen (TYLENOL) tablet 650 mg  650 mg Oral Q4H PRN         [Held by provider] amLODIPine (NORVASC) tablet 10 mg  10 mg Oral Daily         calcium carbonate 500 mg (elemental) (OSCAL) tablet 500 mg  500 mg Oral BID   500 mg at 11/01/20 2114     [Held by provider] carvedilol (COREG) tablet 12.5 mg  12.5 mg Oral QAM         [Held by provider] carvedilol (COREG) tablet 25 mg  25 mg Oral At Bedtime   25 mg at 10/30/20 2141     [Held by provider] cloNIDine (CATAPRES) tablet 0.1 mg  0.1 mg Oral BID   0.1 mg at 10/30/20 2141     docusate sodium (COLACE) capsule 100 mg  100 mg Oral At Bedtime   100 mg at 10/30/20 2141     gabapentin (NEURONTIN) capsule 200 mg  200 mg Oral BID   200 mg at 11/01/20 2114     heparin ANTICOAGULANT injection 5,000 Units  5,000 Units Subcutaneous Q12H   5,000 Units at 11/01/20 2158     [Held by provider] isosorbide dinitrate (ISORDIL) tablet 10 mg  10 mg Oral TID   10 mg at 10/30/20 2241     lidocaine (LMX4) cream   Topical Q1H PRN         lidocaine 1 % 0.1-1 mL  0.1-1 mL Other Q1H PRN         melatonin tablet 1 mg  1 mg Oral At Bedtime PRN         micafungin (MYCAMINE) 100 mg in sodium chloride 0.9 % 100 mL intermittent infusion  100 mg Intravenous Q24H 100 mL/hr at  11/02/20 0338 100 mg at 11/02/20 0338     naloxone (NARCAN) injection 0.1-0.4 mg  0.1-0.4 mg Intravenous Q2 Min PRN         omeprazole (priLOSEC) CR capsule 20 mg  20 mg Oral Daily   20 mg at 11/01/20 0848     ondansetron (ZOFRAN) injection 4 mg  4 mg Intravenous Q8H PRN         ondansetron (ZOFRAN-ODT) ODT tab 4 mg  4 mg Oral Q6H PRN   4 mg at 11/02/20 0404     oxyCODONE IR (ROXICODONE) half-tab 2.5 mg  2.5 mg Oral TID   2.5 mg at 11/01/20 2114     polyethylene glycol (MIRALAX) Packet 17 g  17 g Oral Daily         polyethylene glycol (MIRALAX) Packet 17 g  17 g Oral Daily PRN         senna-docusate (SENOKOT-S/PERICOLACE) 8.6-50 MG per tablet 1 tablet  1 tablet Oral BID   1 tablet at 11/01/20 2114    Or     senna-docusate (SENOKOT-S/PERICOLACE) 8.6-50 MG per tablet 2 tablet  2 tablet Oral BID         senna-docusate (SENOKOT-S/PERICOLACE) 8.6-50 MG per tablet 1 tablet  1 tablet Oral BID PRN        Or     senna-docusate (SENOKOT-S/PERICOLACE) 8.6-50 MG per tablet 2 tablet  2 tablet Oral BID PRN         sodium chloride (PF) 0.9% PF flush 3 mL  3 mL Intracatheter q1 min prn         sodium chloride (PF) 0.9% PF flush 3 mL  3 mL Intracatheter Q8H   3 mL at 11/01/20 1756     No current [facility]-ordered outpatient medications on file.                  Physical Exam:   Vitals were reviewed  Patient Vitals for the past 8 hrs:   BP Temp Temp src Pulse Resp SpO2   11/02/20 0803 (!) 168/68 96.9  F (36.1  C) Oral 81 16 96 %   11/02/20 0350 (!) 154/87 98.6  F (37  C) Oral 82 16 (!) 88 %     GEN: NAD, lying in bed  EYES: EOMI  MOUTH: MMM  NECK: Supple  RESP: Unlabored breathing  CARDIAC: No LE edema  SKIN: Warm  ABD: soft  NEURO: AAO   : Clear           Data:     Lab Results   Component Value Date    NTBNPI 332 05/06/2020    NTBNPI 522 06/23/2011     Lab Results   Component Value Date    WBC 6.5 11/02/2020    WBC 8.5 11/01/2020    WBC 27.8 (H) 10/31/2020    HGB 9.9 (L) 11/02/2020    HGB 9.6 (L) 11/01/2020    HGB 9.1 (L) 10/31/2020     HCT 31.3 (L) 11/02/2020    HCT 30.1 (L) 11/01/2020    HCT 28.1 (L) 10/31/2020     (H) 11/02/2020     11/01/2020     10/31/2020    PLT 84 (L) 11/02/2020    PLT 96 (L) 11/01/2020     10/31/2020     Lab Results   Component Value Date    INR 1.13 05/06/2020    INR 1.18 (H) 08/28/2019    INR 1.18 (H) 06/20/2017

## 2020-11-02 NOTE — PROGRESS NOTES
Care Management Follow Up Note    Length of Stay (days) 2    Patient plan of care discussed at Interdisciplinary Rounds: yes  Expected Discharge Date: 11/02/20  Concerns to be Addressed: all concerns addressed in this encounter       Anticipated Discharge Disposition: Assisted Living  Anticipated Discharge Services:  TBD  Anticipated Discharge DME: Walker    Plan:  [facility] spoke with Alistair director of nursing at [facility]  Phone [phone] fax [phone] he said patient currently get assistance with bathing, safety checks and medication administration.  She had been independent with mobility and eating. Can accept patient back when medically stable.     Jenniffer Chan RN, BSN, PHN, CTS  Care Coordinator  [facility]  [phone]       Addendum [id number]  CTS called and spoke with Alistair at facility to inform patient would not be discharging today.  He would like an update on potential discharge tomorrow, if known.    Jenniffer Chan RN

## 2020-11-02 NOTE — PLAN OF CARE
VSS BP elevated- still holding BP meds, on RA, afebrile. Up Ax2 with walker and gait belt. Disoriented to situation, forgetful. Up in chair for dinner. Ate well. Vallecillo in place, draining yellow urine. Bottom red, blanchable. Have been turning and repositioning. Complains of RLQ pain- scheduled meds given. Plan- likely remove vallecillo tomorrow- per urology note, encourage movement, Cipro daily and linezolid every 12 hours.

## 2020-11-02 NOTE — PLAN OF CARE
[id number]    Inpatient Progress Note:    BP (!) 168/57 (BP Location: Left arm)   Pulse 87   Temp 98.4  F (36.9  C) (Oral)   Resp 16   Ht 1.524 m (5')   Wt 57.6 kg (127 lb)   SpO2 93%   BMI 24.80 kg/m         Orientation: Alert and oriented x4 but forgetful   Neuro: Intact   Pain status: Managed with scheduled meds (oxy,tyl). Denied any pain with interview.   Activity: Extensive assist of two with transfer but does okay walking with one assist.   Peripheral edema: None noted.   Resp: Lung sounds are clear and non-labored. Sats Dropped to 87-88%RA while sleeping and placed on oxygen at 1 LPM per NC. Sats up to 94%.    Cardiac: WNL,   GI: Bowels are active x4 quads and denies any constipation   : Almanza in place and patent. Complained of itching vagina and Labia Majora. Denies burning. Site assessed to have redness blanchable. Area cleansed, Catheter care provided and barrier cream applied. Patient reports relieve.    Skin: Redness Blanchable  LDA: Peripheral   Infusions: Saline lock, IV antibiotics   Pertinent Labs: Blood Culture growing Yeast and Provider notified with new orders. (See MAR).   Diet: Regular. Complained of Nausea during shift and given Zofran which was effective.     Consults: PT and Social Work   Discharge Plan: TCU placement     Will continue to monitor and provide cares.     Elke Peña RN

## 2020-11-02 NOTE — PROGRESS NOTES
[facility]    Medicine Progress Note - Hospitalist Service       Date of Admission:  10/30/2020  Assessment & Plan         Daya Ignacio is a 88 year old female with past medical history of hypertension, chronic anemia, lumbar compression fractures with chronic back pain, multiple hospitalizations UTI (VRE, ESBL), history of C. Difficile, diverticular GI bleed, CAD (STEMI 5/2017), CHF (with preserved EF), depression, neuropathy, hospitalization in May 2020 with obstructing stone, UTI with left stent placement/lithotripsy. Admitted 10/30 for delayed stent removal (was lost to follow-up). Hospitalist service consulted for hypotension on 10/31 am. On antibiotics for UTI/sepsis. Now growing yeast in blood. Overnight MD started micafungin.    Fungemia    - prelim blood cultures from 10/31 growing yeast x 2, final cultures pending    - micafungin start by covering MD last night (received one dose)    - repeated blood cultures today    - I have consulted ID and paged Dr. Camejo    - will check LFTs in am in setting of starting antifungal    - ECHO ordered overnight (I am unclear why, but it is in process)    Possible UTI with sepsis     - has grown Citrobacter (>100K), Proteus 10-50K), VRE 10-50K) in May     - was on oral keflex and cipro    - discontinued keflex and continued cipro (received 2 doses over 2 days)    - added linezolid for possible VRE (received 4 doses)    - urine cultures are final today and are growing <10,000 mixed urogenital jorge    - will stop cipro and linezolid (and will discuss with ID)    Hypotension (in the setting of baseline HTN)    - resolved    - patient was hypertensive on 10/30 and received: carvedilol 25mg at 2140, clonidine 0.1mg at 2140, isosorbide 10mg at 2240, and IV labetalol 10mg at 1745, 1900, 1930, and 2015)    - then on am of 10/31 was hypotensive into the 70's    - responded to IVF    - BPs now elevated today    - resume pta carvedilol and amlodipine  "today    - cont to hold pta imdur, lisinopril and clonidine (resume one at a time if appropriate)     Question of aspiration evening of 10/31    - as per nursing patient \"choked\" while eating    - patient states it was the green beans    - typically does not have issues eating    - CXR last night was negative    - no issues today    - titrating off oxygen    Acute renal failure    - Cr was 1.2     - holding pta lisinopril    - resolved    - discontinued IVF as patient is eating and drinking well    POD #3 L ureteral stent exchange/lithotripsy    - vallecillo as per Urology- discontinue tomorrow    Thrombocytopenia    - likely reactive    - will monitor    Hypokalemia    - resolved    History CAD with STEMI in 5/2017    - holding Imdur     - resumed pta carvedilol    - not on asa as outpt    Depression   - holding pta sertraline (interaction with linezolid)    - can resume if ID agrees with my discontinuation of linezolid    Chronic pain    - cont PT oxy    GERD    - cont pta PPI    Will increase ambulation today  Needs PT christophe     Called son and updated him    Full Code changed DNR/DNI: confirmed with patient and son  Admit to inpatient for hypotension, possible sepsis     Diet: Combination Diet Regular Diet Adult    DVT Prophylaxis: thrombocytopenia, stop heparin, add scds  Vallecillo Catheter: in place, indication: /GI/GYN Pelvic Procedure  Code Status: No CPR- Do NOT Intubate           Disposition Plan   Expected discharge: 1-2 days, recommended to prior living arrangement once SIRS/Sepsis treated.  Entered: Juliano Mckeon MD 11/02/2020, 8:31 AM       The patient's care was discussed with the Bedside Nurse, Patient, Patient's Family and Urology Consultant.    Juliano Mckeon MD  Hospitalist Service  [facility]  Contact information available via [facility] Paging/Directory    ______________________________________________________________________    Interval History   Patient in chair. States she feels " ok. A&O x 3. No chest pain, sob, abdo pain, n/v/d    Data reviewed today: I reviewed all medications, new labs and imaging results over the last 24 hours. I personally reviewed no images or EKG's today.    Physical Exam   Vital Signs: Temp: 96.9  F (36.1  C) Temp src: Oral BP: (!) 168/68 Pulse: 81   Resp: 16 SpO2: 96 % O2 Device: Nasal cannula Oxygen Delivery: 2 LPM  Weight: 127 lbs 0 oz  Constitutional: awake, alert, cooperative, no apparent distress, and appears stated age  Eyes: Lids and lashes normal, pupils equal, round and reactive to light, extra ocular muscles intact, sclera clear, conjunctiva normal  ENT: Normocephalic, without obvious abnormality, atraumatic, sinuses nontender on palpation, external ears without lesions, oral pharynx with moist mucous membranes, tonsils without erythema or exudates, gums normal and good dentition.  Respiratory: No increased work of breathing, good air exchange, clear to auscultation bilaterally, no crackles or wheezing  Cardiovascular: Normal apical impulse, regular rate and rhythm, normal S1 and S2, no S3 or S4, and no murmur noted  GI: No scars, normal bowel sounds, soft, non-distended, non-tender, no masses palpated, no hepatosplenomegally  Skin: no bruising or bleeding  Musculoskeletal: no lower extremity pitting edema present  Urine lighter today    Data   Recent Labs   Lab 11/02/20  0707 11/01/20  0632 10/31/20  0522   WBC 6.5 8.5 27.8*   HGB 9.9* 9.6* 9.1*   * 100 100   PLT 84* 96* 152    135 138   POTASSIUM 4.0 4.3 3.3*   CHLORIDE 104 105 104   CO2 26 24 25   BUN 11 24 20   CR 0.54 0.94 1.20*   ANIONGAP 6 6 9   DAVE 8.6 8.4* 8.4*   * 93 93     No results found for this or any previous visit (from the past 24 hour(s)).

## 2020-11-02 NOTE — CONSULTS
ID consult dictated IMP 1 89 yo female with candidemia, well known to me, presume urine    REc noah, serial, BC watch hips, hopefully sens , called lab to [facility]  As an aside NO PROPYLAXIS here is useless for all recent micro and driving high end R

## 2020-11-02 NOTE — PROGRESS NOTES
Cross cover    Patient with 1 out of 2 blood cultures positive for yeast.  Patient afebrile and vital signs stable.  Will check Fungitell.  If positive will start Caspofungin 70 mg IV loading dose followed by 50 mg IV qday.    Addendum:    2 out of 2 blood cultures positive for yeast.  Will start Caspofungin 70 mg IV loading dose followed by 50 mg IV qday.  Echo ordered.

## 2020-11-03 ENCOUNTER — APPOINTMENT (OUTPATIENT)
Dept: PHYSICAL THERAPY | Facility: CLINIC | Age: 85
DRG: 659 | End: 2020-11-03
Attending: UROLOGY
Payer: COMMERCIAL

## 2020-11-03 LAB
1,3 BETA GLUCAN SER-MCNC: <31 PG/ML
ALBUMIN SERPL-MCNC: 2.6 G/DL (ref 3.4–5)
ALP SERPL-CCNC: 144 U/L (ref 40–150)
ALT SERPL W P-5'-P-CCNC: 22 U/L (ref 0–50)
ANION GAP SERPL CALCULATED.3IONS-SCNC: 4 MMOL/L (ref 3–14)
AST SERPL W P-5'-P-CCNC: 30 U/L (ref 0–45)
B-D GLUCAN INTERPRETATION (1,3): NEGATIVE
BASOPHILS # BLD AUTO: 0 10E9/L (ref 0–0.2)
BASOPHILS NFR BLD AUTO: 0.2 %
BILIRUB DIRECT SERPL-MCNC: 0.2 MG/DL (ref 0–0.2)
BILIRUB SERPL-MCNC: 0.6 MG/DL (ref 0.2–1.3)
BUN SERPL-MCNC: 9 MG/DL (ref 7–30)
CALCIUM SERPL-MCNC: 8.6 MG/DL (ref 8.5–10.1)
CHLORIDE SERPL-SCNC: 104 MMOL/L (ref 94–109)
CO2 SERPL-SCNC: 28 MMOL/L (ref 20–32)
CREAT SERPL-MCNC: 0.51 MG/DL (ref 0.52–1.04)
DIFFERENTIAL METHOD BLD: ABNORMAL
EOSINOPHIL # BLD AUTO: 0.3 10E9/L (ref 0–0.7)
EOSINOPHIL NFR BLD AUTO: 6.1 %
ERYTHROCYTE [DISTWIDTH] IN BLOOD BY AUTOMATED COUNT: 14.9 % (ref 10–15)
GFR SERPL CREATININE-BSD FRML MDRD: 85 ML/MIN/{1.73_M2}
GLUCOSE SERPL-MCNC: 111 MG/DL (ref 70–99)
HCT VFR BLD AUTO: 29.9 % (ref 35–47)
HGB BLD-MCNC: 9.5 G/DL (ref 11.7–15.7)
IMM GRANULOCYTES # BLD: 0 10E9/L (ref 0–0.4)
IMM GRANULOCYTES NFR BLD: 0.9 %
LYMPHOCYTES # BLD AUTO: 0.6 10E9/L (ref 0.8–5.3)
LYMPHOCYTES NFR BLD AUTO: 14.5 %
MCH RBC QN AUTO: 31.5 PG (ref 26.5–33)
MCHC RBC AUTO-ENTMCNC: 31.8 G/DL (ref 31.5–36.5)
MCV RBC AUTO: 99 FL (ref 78–100)
MONOCYTES # BLD AUTO: 0.4 10E9/L (ref 0–1.3)
MONOCYTES NFR BLD AUTO: 8.6 %
NEUTROPHILS # BLD AUTO: 3.1 10E9/L (ref 1.6–8.3)
NEUTROPHILS NFR BLD AUTO: 69.7 %
NRBC # BLD AUTO: 0 10*3/UL
NRBC BLD AUTO-RTO: 0 /100
PLATELET # BLD AUTO: 83 10E9/L (ref 150–450)
POTASSIUM SERPL-SCNC: 3.7 MMOL/L (ref 3.4–5.3)
PROT SERPL-MCNC: 6.2 G/DL (ref 6.8–8.8)
RBC # BLD AUTO: 3.02 10E12/L (ref 3.8–5.2)
SODIUM SERPL-SCNC: 136 MMOL/L (ref 133–144)
WBC # BLD AUTO: 4.4 10E9/L (ref 4–11)

## 2020-11-03 PROCEDURE — 250N000013 HC RX MED GY IP 250 OP 250 PS 637: Performed by: UROLOGY

## 2020-11-03 PROCEDURE — 80048 BASIC METABOLIC PNL TOTAL CA: CPT | Performed by: HOSPITALIST

## 2020-11-03 PROCEDURE — 99233 SBSQ HOSP IP/OBS HIGH 50: CPT | Performed by: HOSPITALIST

## 2020-11-03 PROCEDURE — 97116 GAIT TRAINING THERAPY: CPT | Mod: GP | Performed by: PHYSICAL THERAPIST

## 2020-11-03 PROCEDURE — 97530 THERAPEUTIC ACTIVITIES: CPT | Mod: GP | Performed by: PHYSICAL THERAPIST

## 2020-11-03 PROCEDURE — 250N000011 HC RX IP 250 OP 636: Performed by: HOSPITALIST

## 2020-11-03 PROCEDURE — 97161 PT EVAL LOW COMPLEX 20 MIN: CPT | Mod: GP | Performed by: PHYSICAL THERAPIST

## 2020-11-03 PROCEDURE — 85025 COMPLETE CBC W/AUTO DIFF WBC: CPT | Performed by: HOSPITALIST

## 2020-11-03 PROCEDURE — 250N000013 HC RX MED GY IP 250 OP 250 PS 637: Performed by: HOSPITALIST

## 2020-11-03 PROCEDURE — 36415 COLL VENOUS BLD VENIPUNCTURE: CPT | Performed by: HOSPITALIST

## 2020-11-03 PROCEDURE — 258N000003 HC RX IP 258 OP 636: Performed by: INTERNAL MEDICINE

## 2020-11-03 PROCEDURE — 250N000011 HC RX IP 250 OP 636: Performed by: INTERNAL MEDICINE

## 2020-11-03 PROCEDURE — 80076 HEPATIC FUNCTION PANEL: CPT | Performed by: HOSPITALIST

## 2020-11-03 PROCEDURE — 120N000004 HC R&B MS OVERFLOW

## 2020-11-03 RX ADMIN — ACETAMINOPHEN 500 MG: 500 TABLET, FILM COATED ORAL at 23:58

## 2020-11-03 RX ADMIN — CARVEDILOL 25 MG: 25 TABLET, FILM COATED ORAL at 21:36

## 2020-11-03 RX ADMIN — ACETAMINOPHEN 500 MG: 500 TABLET, FILM COATED ORAL at 18:09

## 2020-11-03 RX ADMIN — ISOSORBIDE DINITRATE 10 MG: 10 TABLET ORAL at 21:36

## 2020-11-03 RX ADMIN — AMLODIPINE BESYLATE 10 MG: 10 TABLET ORAL at 08:44

## 2020-11-03 RX ADMIN — HEPARIN SODIUM 5000 UNITS: 10000 INJECTION, SOLUTION INTRAVENOUS; SUBCUTANEOUS at 11:52

## 2020-11-03 RX ADMIN — ACETAMINOPHEN 500 MG: 500 TABLET, FILM COATED ORAL at 11:53

## 2020-11-03 RX ADMIN — OMEPRAZOLE 20 MG: 20 CAPSULE, DELAYED RELEASE ORAL at 08:43

## 2020-11-03 RX ADMIN — ISOSORBIDE DINITRATE 10 MG: 10 TABLET ORAL at 14:28

## 2020-11-03 RX ADMIN — CALCIUM 500 MG: 500 TABLET ORAL at 21:36

## 2020-11-03 RX ADMIN — Medication 2.5 MG: at 14:28

## 2020-11-03 RX ADMIN — DOCUSATE SODIUM 50 MG AND SENNOSIDES 8.6 MG 1 TABLET: 8.6; 5 TABLET, FILM COATED ORAL at 08:44

## 2020-11-03 RX ADMIN — GABAPENTIN 200 MG: 100 CAPSULE ORAL at 21:36

## 2020-11-03 RX ADMIN — LISINOPRIL 30 MG: 20 TABLET ORAL at 14:28

## 2020-11-03 RX ADMIN — POLYETHYLENE GLYCOL 3350 17 G: 17 POWDER, FOR SOLUTION ORAL at 08:44

## 2020-11-03 RX ADMIN — Medication 2.5 MG: at 08:43

## 2020-11-03 RX ADMIN — DOCUSATE SODIUM 100 MG: 100 CAPSULE, LIQUID FILLED ORAL at 21:35

## 2020-11-03 RX ADMIN — CALCIUM 500 MG: 500 TABLET ORAL at 08:44

## 2020-11-03 RX ADMIN — Medication 2.5 MG: at 21:36

## 2020-11-03 RX ADMIN — GABAPENTIN 200 MG: 100 CAPSULE ORAL at 08:43

## 2020-11-03 RX ADMIN — CARVEDILOL 12.5 MG: 12.5 TABLET, FILM COATED ORAL at 08:43

## 2020-11-03 RX ADMIN — MICAFUNGIN SODIUM 100 MG: 50 INJECTION, POWDER, LYOPHILIZED, FOR SOLUTION INTRAVENOUS at 03:22

## 2020-11-03 NOTE — PROGRESS NOTES
[facility] Progress Note          Assessment and Plan:   Principal Problem:    Ureteral stone    Assessment: Fungemia    Plan: ID consult appreciated, reviewed with Dr. Mckeon.  Appreciate wonderful care from the Hospitalist Service.   Blood cultures pend (repeated).  Should never be put back on Keflex prophy.     Bonita Whitaker PA-C  [facility]  [phone]               Interval History:   doing well, voiding              Review of Systems:   The 5 point Review of Systems is negative other than noted in the HPI             Medications:     Current Facility-Administered Medications Ordered in Epic   Medication Dose Route Frequency Last Rate Last Dose     acetaminophen (TYLENOL) tablet 500 mg  500 mg Oral TID   500 mg at 11/02/20 2359     acetaminophen (TYLENOL) tablet 650 mg  650 mg Oral Q4H PRN         amLODIPine (NORVASC) tablet 10 mg  10 mg Oral Daily   10 mg at 11/02/20 1045     calcium carbonate 500 mg (elemental) (OSCAL) tablet 500 mg  500 mg Oral BID   500 mg at 11/02/20 2018     carvedilol (COREG) tablet 12.5 mg  12.5 mg Oral QAM   12.5 mg at 11/02/20 1045     carvedilol (COREG) tablet 25 mg  25 mg Oral At Bedtime   25 mg at 11/02/20 2210     [Held by provider] cloNIDine (CATAPRES) tablet 0.1 mg  0.1 mg Oral BID   0.1 mg at 10/30/20 2141     docusate sodium (COLACE) capsule 100 mg  100 mg Oral At Bedtime   100 mg at 10/30/20 2141     gabapentin (NEURONTIN) capsule 200 mg  200 mg Oral BID   200 mg at 11/02/20 2019     heparin ANTICOAGULANT injection 5,000 Units  5,000 Units Subcutaneous Q12H   5,000 Units at 11/02/20 2219     [Held by provider] isosorbide dinitrate (ISORDIL) tablet 10 mg  10 mg Oral TID   10 mg at 10/30/20 2241     lidocaine (LMX4) cream   Topical Q1H PRN         lidocaine 1 % 0.1-1 mL  0.1-1 mL Other Q1H PRN         melatonin tablet 1 mg  1 mg Oral At Bedtime PRN         micafungin (MYCAMINE) 100 mg in sodium chloride 0.9 % 100 mL intermittent infusion  100 mg  Intravenous Q24H 100 mL/hr at 11/03/20 0322 100 mg at 11/03/20 0322     naloxone (NARCAN) injection 0.1-0.4 mg  0.1-0.4 mg Intravenous Q2 Min PRN         omeprazole (priLOSEC) CR capsule 20 mg  20 mg Oral Daily   20 mg at 11/02/20 0842     ondansetron (ZOFRAN) injection 4 mg  4 mg Intravenous Q8H PRN         ondansetron (ZOFRAN-ODT) ODT tab 4 mg  4 mg Oral Q6H PRN   4 mg at 11/02/20 0404     oxyCODONE IR (ROXICODONE) half-tab 2.5 mg  2.5 mg Oral TID   2.5 mg at 11/02/20 2019     polyethylene glycol (MIRALAX) Packet 17 g  17 g Oral Daily         polyethylene glycol (MIRALAX) Packet 17 g  17 g Oral Daily PRN         senna-docusate (SENOKOT-S/PERICOLACE) 8.6-50 MG per tablet 1 tablet  1 tablet Oral BID   1 tablet at 11/01/20 2114    Or     senna-docusate (SENOKOT-S/PERICOLACE) 8.6-50 MG per tablet 2 tablet  2 tablet Oral BID         senna-docusate (SENOKOT-S/PERICOLACE) 8.6-50 MG per tablet 1 tablet  1 tablet Oral BID PRN        Or     senna-docusate (SENOKOT-S/PERICOLACE) 8.6-50 MG per tablet 2 tablet  2 tablet Oral BID PRN         sodium chloride (PF) 0.9% PF flush 3 mL  3 mL Intracatheter q1 min prn         sodium chloride (PF) 0.9% PF flush 3 mL  3 mL Intracatheter Q8H   3 mL at 11/03/20 0322     No current [facility]-ordered outpatient medications on file.                  Physical Exam:   Vitals were reviewed  Patient Vitals for the past 8 hrs:   BP Temp Temp src Pulse Resp SpO2   11/03/20 0729 (!) 172/71 96.5  F (35.8  C) Oral 72 16 96 %   11/03/20 0329 (!) 149/59 96.9  F (36.1  C) Axillary 68 18 95 %     GEN: NAD, sitting in chair  EYES: EOMI  MOUTH: MMM  NECK: Supple  RESP: Unlabored breathing  CARDIAC: No LE edema  SKIN: Warm  ABD: soft  NEURO: AAO           Data:     Lab Results   Component Value Date    NTBNPI 332 05/06/2020    NTBNPI 522 06/23/2011     Lab Results   Component Value Date    WBC 4.4 11/03/2020    WBC 6.5 11/02/2020    WBC 8.5 11/01/2020    HGB 9.5 (L) 11/03/2020    HGB 9.9 (L) 11/02/2020    HGB  9.6 (L) 11/01/2020    HCT 29.9 (L) 11/03/2020    HCT 31.3 (L) 11/02/2020    HCT 30.1 (L) 11/01/2020    MCV 99 11/03/2020     (H) 11/02/2020     11/01/2020    PLT 83 (L) 11/03/2020    PLT 84 (L) 11/02/2020    PLT 96 (L) 11/01/2020     Lab Results   Component Value Date    INR 1.13 05/06/2020    INR 1.18 (H) 08/28/2019    INR 1.18 (H) 06/20/2017

## 2020-11-03 NOTE — PROGRESS NOTES
[facility]    Medicine Progress Note - Hospitalist Service       Date of Admission:  10/30/2020  Assessment & Plan         Daya Ignacio is a 88 year old female with past medical history of hypertension, chronic anemia, lumbar compression fractures with chronic back pain, multiple hospitalizations UTI (VRE, ESBL), history of C. Difficile, diverticular GI bleed, CAD (STEMI 5/2017), CHF (with preserved EF), depression, neuropathy, hospitalization in May 2020 with obstructing stone, UTI with left stent placement/lithotripsy. Admitted 10/30 for delayed stent removal (was lost to follow-up). Hospitalist service consulted for hypotension on 10/31 am. On antibiotics for UTI/sepsis. Now growing yeast in blood. On micafungin.    Fungemia    - prelim blood cultures from 10/31 growing yeast x 2, final still cultures pending    - on micafungin (received 2 doses)    - repeated blood cultures yesterday: negative so far    - Dr. Camejo is following    - LFTs normal (will need re-check at some point due to micafungin)    - ECHO ordered overnight yesterday (not clear why): no concerning findings    Possible UTI with sepsis     - has grown Citrobacter (>100K), Proteus 10-50K), VRE 10-50K) in May     - was on oral keflex and cipro    - discontinued keflex and continued cipro (received 2 doses over 2 days)    - added linezolid for possible VRE (received 4 doses)    - urine cultures are final today and are growing <10,000 mixed urogenital jorge     - stopped cipro and linezolid yesterday    - I did speak to the son regarding not using prophylactic antibiotics in this patient (was on keflex, prescribed by Nuria?)    Hypotension (in the setting of baseline HTN)    - resolved    - patient was hypertensive on 10/30 and received: carvedilol 25mg at 2140, clonidine 0.1mg at 2140, isosorbide 10mg at 2240, and IV labetalol 10mg at 1745, 1900, 1930, and 2015)    - then on am of 10/31 was hypotensive into the 70's    -  "responded to IVF    - BPs now elevated today    - resumed pta carvedilol and amlodipine yesterday    - resume pt imdur and lisinopril today    - cont to hold pta clonidine and can resume tomorrow if BP elevated     Question of aspiration evening of 10/31    - as per nursing patient \"choked\" while eating    - patient states it was the green beans    - typically does not have issues eating    - CXR last night was negative    - no issues today    - oxygen off    Acute renal failure    - Cr was 1.2     - holding pta lisinopril    - resolved    - discontinued IVF as patient is eating and drinking well    POD #4 L ureteral stent exchange/lithotripsy    - vallecillo was discontinued    Thrombocytopenia    - likely reactive    - will monitor    Hypokalemia    - resolved    History CAD with STEMI in 5/2017    - holding Imdur     - resumed pta carvedilol    - not on asa as outpt    Depression    - not on meds    Chronic pain    - cont PT oxy    GERD    - cont pta PPI    Will increase ambulation today    Seen by PT, will be able to discharge back to her apartment     Called son and updated him    Full Code changed DNR/DNI: confirmed with patient and son       Diet: Combination Diet Regular Diet Adult    DVT Prophylaxis: thrombocytopenia, stop heparin, add scds  Vallecillo Catheter: not present  Code Status: No CPR- Do NOT Intubate           Disposition Plan   Expected discharge: 1-2 days, recommended to prior living arrangement once SIRS/Sepsis treated.  Entered: Juliano Mckeon MD 11/03/2020, 12:04 PM       The patient's care was discussed with the Bedside Nurse, Patient, Patient's Family and Urology Consultant.    Juliano Mckeon MD  Hospitalist Service  [facility]  Contact information available via [facility] Paging/Directory    ______________________________________________________________________    Interval History   Patient in chair. States she feels ok. A&O x 3. No chest pain, sob, abdo pain, n/v/d    Data " reviewed today: I reviewed all medications, new labs and imaging results over the last 24 hours. I personally reviewed no images or EKG's today.    Physical Exam   Vital Signs: Temp: 96.5  F (35.8  C) Temp src: Oral BP: (!) 172/71 Pulse: 72   Resp: 16 SpO2: 96 % O2 Device: None (Room air) Oxygen Delivery: 1 LPM  Weight: 127 lbs 0 oz  Constitutional: awake, alert, cooperative, no apparent distress, and appears stated age  Eyes: Lids and lashes normal, pupils equal, round and reactive to light, extra ocular muscles intact, sclera clear, conjunctiva normal  ENT: Normocephalic, without obvious abnormality, atraumatic, sinuses nontender on palpation, external ears without lesions, oral pharynx with moist mucous membranes, tonsils without erythema or exudates, gums normal and good dentition.  Respiratory: No increased work of breathing, good air exchange, clear to auscultation bilaterally, no crackles or wheezing  Cardiovascular: Normal apical impulse, regular rate and rhythm, normal S1 and S2, no S3 or S4, and no murmur noted  GI: No scars, normal bowel sounds, soft, non-distended, non-tender, no masses palpated, no hepatosplenomegally  Skin: no bruising or bleeding  Musculoskeletal: no lower extremity pitting edema present      Data   Recent Labs   Lab 11/03/20  0511 11/02/20  0707 11/01/20  0632   WBC 4.4 6.5 8.5   HGB 9.5* 9.9* 9.6*   MCV 99 101* 100   PLT 83* 84* 96*    136 135   POTASSIUM 3.7 4.0 4.3   CHLORIDE 104 104 105   CO2 28 26 24   BUN 9 11 24   CR 0.51* 0.54 0.94   ANIONGAP 4 6 6   DAVE 8.6 8.6 8.4*   * 111* 93   ALBUMIN 2.6*  --   --    PROTTOTAL 6.2*  --   --    BILITOTAL 0.6  --   --    ALKPHOS 144  --   --    ALT 22  --   --    AST 30  --   --      No results found for this or any previous visit (from the past 24 hour(s)).

## 2020-11-03 NOTE — PROVIDER NOTIFICATION
Heparin dose order states to hold heparin if platelet count is less than 100,000 uL and notify provider. FARZANA Hardy, notified and she stated to refer to pharmacy. Pastora, pharmacist, notified and OK'ed 2230 administration of heparin. Ashu RIZVI RN, verified Pastora's instructions.

## 2020-11-03 NOTE — PROGRESS NOTES
[facility]  Infectious Disease Progress Note          Assessment and Plan:   IMPRESSION:   1.  A 89-year-old female, well known with recurrent UTIs, currently admitted with low blood pressure, longstanding stent in place, probable UTI although, of note, urine without major pathogens.  Stent has now been replaced.   2.  Candidemia at admission is current active infection, presumably from the urine, urine was not worked up, but had the usual jorge, which might be Candida, so will have them evaluate.   3.  History of recurrent UTIs with numerous resistant organisms, also chronic symptoms are likely not UTI related.   4.  History of C. diff in 2019, not since.   5.  VRE and ESBL historical infections including recent.   6.  Bilateral total hip arthroplasties.  No evidence of involvement so far.   7.  Mild renal dysfunction.   8.  NITROFURANTOIN, SULFA AND PENICILLIN ALLERGIES.      RECOMMENDATIONS:   1.  Continue micafungin, serial blood cultures until clear.   2.  Watch for any secondary sites including and especially her hips or eyes.  Hopefully, will clear quickly , but in 2 ways problematic, not in UC so source not completely clear (still likely urine), and more sig is C glabrata so will not be sens flucon at acceptable level  3.  This current organism is just further evidence that prophylaxis is absolutely not indicated here.  Numerous resistant organisms being created, no likely value whatsoever.  Would not do prophylaxis in this patient.  Await Follow-up cxs, follow sxs, await sens no added ABX for UC no long line              Interval History:   no new complaints malaise and nausea no fever Bc is C glabrata UC no candida.               Medications:       acetaminophen  500 mg Oral TID     amLODIPine  10 mg Oral Daily     calcium carbonate 500 mg (elemental)  500 mg Oral BID     carvedilol  12.5 mg Oral QAM     carvedilol  25 mg Oral At Bedtime     [Held by provider] cloNIDine  0.1 mg Oral  BID     docusate sodium  100 mg Oral At Bedtime     gabapentin  200 mg Oral BID     isosorbide dinitrate  10 mg Oral TID     lisinopril  30 mg Oral Daily     micafungin  100 mg Intravenous Q24H     omeprazole  20 mg Oral Daily     oxyCODONE  2.5 mg Oral TID     polyethylene glycol  17 g Oral Daily     senna-docusate  1 tablet Oral BID    Or     senna-docusate  2 tablet Oral BID     sodium chloride (PF)  3 mL Intracatheter Q8H                  Physical Exam:   Blood pressure (!) 172/71, pulse 72, temperature 96.5  F (35.8  C), temperature source Oral, resp. rate 16, height 1.524 m (5'), weight 57.6 kg (127 lb), SpO2 96 %, not currently breastfeeding.  Wt Readings from Last 2 Encounters:   10/30/20 57.6 kg (127 lb)   09/30/20 56.2 kg (124 lb)     Vital Signs with Ranges  Temp:  [96.5  F (35.8  C)-97.3  F (36.3  C)] 96.5  F (35.8  C)  Pulse:  [67-80] 72  Resp:  [12-18] 16  BP: (137-172)/(53-80) 172/71  SpO2:  [87 %-96 %] 96 %    Constitutional: Awake, alert, cooperative, no apparent distress   Lungs: Clear to auscultation bilaterally, no crackles or wheezing   Cardiovascular: Regular rate and rhythm, normal S1 and S2, and no murmur noted   Abdomen: Normal bowel sounds, soft, non-distended, non-tender   Skin: No rashes, no cyanosis, no edema   Other:           Data:   All microbiology laboratory data reviewed.  Recent Labs   Lab Test 11/03/20  0511 11/02/20  0707 11/01/20  0632   WBC 4.4 6.5 8.5   HGB 9.5* 9.9* 9.6*   HCT 29.9* 31.3* 30.1*   MCV 99 101* 100   PLT 83* 84* 96*     Recent Labs   Lab Test 11/03/20  0511 11/02/20  0707 11/01/20  0632   CR 0.51* 0.54 0.94     No lab results found.  Recent Labs   Lab Test 11/02/20  0819 11/02/20  0817 10/31/20  1130 10/31/20  0941 10/31/20  0938 05/09/20  0944 05/09/20  0920 05/08/20  1538 05/08/20  1533   CULT No growth after 1 day No growth after 1 day <10,000 colonies/mL  Proteus mirabilis  *  <10,000 colonies/mL  Enterococcus faecium  *  <10,000 colonies/mL  Strain  2  Enterococcus faecium  *  <10,000 colonies/mL  mixed urogenital jorge  Susceptibility testing not routinely done    ID requested by Dr. Camejo on 11.2.20 at 1311. bw  Culture in progress Cultured on the 1st day of incubation:  Presumptive identification:  Candida glabrata complex  Referred to mycology for identification  *  Critical Value/Significant Value, preliminary result only, called to and read back by  Elke Peña RN at 0155 11.2.20. amd    Susceptibility testing in progress Cultured on the 1st day of incubation:  Candida glabrata complex  Referred to mycology for identification  *  Critical Value/Significant Value, preliminary result only, called to and read back by  Elke Peña RN 11/2/20 @ 0250 TF   No growth No growth No growth No growth

## 2020-11-03 NOTE — PLAN OF CARE
BP (!) 179/72 (BP Location: Right arm)   Pulse 87   Temp 96.7  F (35.9  C) (Oral)   Resp 16   Ht 1.524 m (5')   Wt 57.6 kg (127 lb)   SpO2 93%   BMI 24.80 kg/m      Neuro: WNL. Forgetful at times.   Cardiac: WNL   Lungs: Ex. Audible wheezes noted. Oxygen WNL. Per pt this is her baseline.   GI: WNL   : WNL   Pain: Tolerated with PO meds.   IV: SL.   Meds: Tolerating PO meds.   Labs/tests: BC + yeast.   Diet: Reg   Activity: Up Assist of 1 with gait belt and walker   Misc: ID consulted. Urology following. PT and SW.     Plan: Follow cultures. Yeast treatment. Consults.     Will continue to monitor and provide cares.

## 2020-11-03 NOTE — PLAN OF CARE
[id number]    Inpatient Progress Note:    BP (!) 149/59 (BP Location: Left arm)   Pulse 68   Temp 96.9  F (36.1  C) (Axillary)   Resp 18   Ht 1.524 m (5')   Wt 57.6 kg (127 lb)   SpO2 95%   BMI 24.80 kg/m     Contact precautions maintained for ESBL/VRE    Orientation: alert and oriented x4, intermittent confusion  Neuro: WDL  Pain status: controlled with scheduled tylenol and oxycodone  Activity: up with 1 assist, walker and gait belt  Peripheral edema: N/A  Resp: expiratory wheezes, 1-2 L O2 at beginning of night, back on RA  Cardiac: WDL  GI: WDL  : WDL, voiding well  Skin: skin tear to R groin  LDA: peripheral  Infusions: Q24H micafungin  Pertinent Labs: COVID (-), Plt 83 10e9/L   Diet: Regular  Consults: hospitalist, PT, SW, ID  Discharge Plan: discharge back to [facility]? PT consulted; following blood cultures, yeast treatment    Will continue to monitor and provide cares.     Deandra Myers RN

## 2020-11-03 NOTE — PROGRESS NOTES
Vitals: BP (!) 172/71 (BP Location: Right arm)   Pulse 72   Temp 96.5  F (35.8  C) (Oral)   Resp 16   Ht 1.524 m (5')   Wt 57.6 kg (127 lb)   SpO2 96%   BMI 24.80 kg/m       Neuro: WDL  Cardiac: WDL  Lungs: WDL  GI: WDL small BM today  : voiding without difficulty   Pain: on scheduled Oxycodone and Tylenol  IV: Micafungin Q24 hrs. Saline locked otherwise  Labs/Imaging: BC from yesterday negative so far.   Diet: combination regular diet  Activity: assist of x1 with a walker and gelt belt  Isolation: contact isolation for ESBL and VRE in urine   Consult: urology, ID, PT, and SW  Plan: continue IV antifungal. discharge to previous living condition (assistant living) when medically stable and final BC is negative.

## 2020-11-03 NOTE — PROGRESS NOTES
Care Management Follow Up    Length of Stay (days): 3    Expected Discharge Date: 11/03/20     Concerns to be Addressed: all concerns addressed in this encounter     Patient plan of care discussed at interdisciplinary rounds: Yes    Anticipated Discharge Disposition: Assisted Living     Anticipated Discharge DME: Walker    Additional Information:  Per discussion with Dr. Mckeon, pt not ready for discharge today, may be ready tomorrow. Called and LM updating Alistair director of nursing at [facility] P: [phone].     Will continue to follow patient for any additional discharge needs.     Shea Starr RN BSN   Inpatient Care Coordination  [facility]   Phone [phone]

## 2020-11-03 NOTE — PROGRESS NOTES
11/03/20 1003   Quick Adds   Type of Visit Initial PT Evaluation   Living Environment   People in home alone   Current Living Arrangements apartment   Home Accessibility no concerns   Transportation Anticipated family or friend will provide   Self-Care   Usual Activity Tolerance good   Current Activity Tolerance moderate   Regular Exercise No   Equipment Currently Used at Home walker, rolling   Disability/Function   Walking or Climbing Stairs Difficulty yes   Walking or Climbing Stairs ambulation difficulty, requires equipment   Mobility Management uses a 4WW   Fall history within last six months no   Change in Functional Status Since Onset of Current Illness/Injury yes   General Information   Onset of Illness/Injury or Date of Surgery 10/30/20   Referring Physician Juliano Mckeon MD   Patient/Family Therapy Goals Statement (PT) Discharge to home   Pertinent History of Current Problem (include personal factors and/or comorbidities that impact the POC) Daya Ignacio is a 88 year old female with past medical history of hypertension, chronic anemia, lumbar compression fractures with chronic back pain, multiple hospitalizations UTI (VRE, ESBL), history of C. Difficile, diverticular GI bleed, CAD (STEMI 5/2017), CHF (with preserved EF), depression, neuropathy, hospitalization in May 2020 with obstructing stone, UTI with left stent placement/lithotripsy. Admitted 10/30 for delayed stent removal (was lost to follow-up). Hospitalist service consulted for hypotension on 10/31 am. On antibiotics for UTI/sepsis. Now growing yeast in blood.   Existing Precautions/Restrictions fall   Weight-Bearing Status - LLE full weight-bearing   Weight-Bearing Status - RLE full weight-bearing   Cognition   Orientation Status (Cognition) oriented x 4   Affect/Mental Status (Cognition) WNL   Follows Commands (Cognition) WNL   Pain Assessment   Patient Currently in Pain Yes, see Vital Sign flowsheet  (notes abdominal pain)    Integumentary/Edema   Integumentary/Edema no deficits were identifed   Posture    Posture Forward head position;Protracted shoulders   Range of Motion (ROM)   ROM Comment WFL   Strength   Strength Comments Decreased functional strength as seen by difficulty with sit<>stand from low heights, and use of 4WW   Bed Mobility   Comment (Bed Mobility) sit<>supine with SBA   Transfers   Transfer Safety Comments sit<>stand with CGA   Gait/Stairs (Locomotion)   Comment (Gait/Stairs) CGA with 4WW   Balance   Balance Comments Requires B UE support for safe dynamic mobility   Sensory Examination   Sensory Perception patient reports no sensory changes   Coordination   Coordination no deficits were identified   Muscle Tone   Muscle Tone no deficits were identified   Clinical Impression   Criteria for Skilled Therapeutic Intervention yes, treatment indicated   PT Diagnosis (PT) Impaired functional mobility   Influenced by the following impairments Pain, weakness,    Functional limitations due to impairments Difficulty with transfers, and decreased gait tolerance   Clinical Presentation Stable/Uncomplicated   Clinical Presentation Rationale progressing medically, clear POC   Clinical Decision Making (Complexity) low complexity   Therapy Frequency (PT) 3x/week   Predicted Duration of Therapy Intervention (days/wks) 5 days   Planned Therapy Interventions (PT) balance training;bed mobility training;gait training;home exercise program;patient/family education;strengthening;transfer training   Risk & Benefits of therapy have been explained evaluation/treatment results reviewed;care plan/treatment goals reviewed;risks/benefits reviewed;current/potential barriers reviewed;participants voiced agreement with care plan;participants included;patient   PT Discharge Planning    PT Discharge Recommendation (DC Rec) home   PT Rationale for DC Rec Pt is able to mobilize safely with SBA. Decreased activity tolerance noted, however this is not  unexpected with acute illness. Anticipate pt will see improvement in activity tolerance as medically managed and nursing continues to mobilize with patient   PT Brief overview of current status  please ambulate 3x/day   Total Evaluation Time   Total Evaluation Time (Minutes) 5

## 2020-11-04 LAB
BASOPHILS # BLD AUTO: 0 10E9/L (ref 0–0.2)
BASOPHILS NFR BLD AUTO: 0.5 %
DIFFERENTIAL METHOD BLD: ABNORMAL
EOSINOPHIL # BLD AUTO: 0.3 10E9/L (ref 0–0.7)
EOSINOPHIL NFR BLD AUTO: 6.7 %
ERYTHROCYTE [DISTWIDTH] IN BLOOD BY AUTOMATED COUNT: 14.6 % (ref 10–15)
GLUCOSE BLDC GLUCOMTR-MCNC: 109 MG/DL (ref 70–99)
HCT VFR BLD AUTO: 29.5 % (ref 35–47)
HGB BLD-MCNC: 9.4 G/DL (ref 11.7–15.7)
IMM GRANULOCYTES # BLD: 0.1 10E9/L (ref 0–0.4)
IMM GRANULOCYTES NFR BLD: 1.7 %
LYMPHOCYTES # BLD AUTO: 1.1 10E9/L (ref 0.8–5.3)
LYMPHOCYTES NFR BLD AUTO: 26.3 %
MCH RBC QN AUTO: 31.1 PG (ref 26.5–33)
MCHC RBC AUTO-ENTMCNC: 31.9 G/DL (ref 31.5–36.5)
MCV RBC AUTO: 98 FL (ref 78–100)
MONOCYTES # BLD AUTO: 0.4 10E9/L (ref 0–1.3)
MONOCYTES NFR BLD AUTO: 10.6 %
NEUTROPHILS # BLD AUTO: 2.3 10E9/L (ref 1.6–8.3)
NEUTROPHILS NFR BLD AUTO: 54.2 %
NRBC # BLD AUTO: 0 10*3/UL
NRBC BLD AUTO-RTO: 0 /100
PLATELET # BLD AUTO: 105 10E9/L (ref 150–450)
RBC # BLD AUTO: 3.02 10E12/L (ref 3.8–5.2)
WBC # BLD AUTO: 4.2 10E9/L (ref 4–11)

## 2020-11-04 PROCEDURE — 250N000013 HC RX MED GY IP 250 OP 250 PS 637: Performed by: HOSPITALIST

## 2020-11-04 PROCEDURE — 999N001017 HC STATISTIC GLUCOSE BY METER IP

## 2020-11-04 PROCEDURE — 250N000011 HC RX IP 250 OP 636: Performed by: INTERNAL MEDICINE

## 2020-11-04 PROCEDURE — 258N000003 HC RX IP 258 OP 636: Performed by: INTERNAL MEDICINE

## 2020-11-04 PROCEDURE — 250N000013 HC RX MED GY IP 250 OP 250 PS 637: Performed by: UROLOGY

## 2020-11-04 PROCEDURE — 36415 COLL VENOUS BLD VENIPUNCTURE: CPT | Performed by: HOSPITALIST

## 2020-11-04 PROCEDURE — 120N000004 HC R&B MS OVERFLOW

## 2020-11-04 PROCEDURE — 99233 SBSQ HOSP IP/OBS HIGH 50: CPT | Performed by: HOSPITALIST

## 2020-11-04 PROCEDURE — 85025 COMPLETE CBC W/AUTO DIFF WBC: CPT | Performed by: HOSPITALIST

## 2020-11-04 PROCEDURE — 250N000011 HC RX IP 250 OP 636: Performed by: UROLOGY

## 2020-11-04 RX ORDER — SERTRALINE HYDROCHLORIDE 100 MG/1
100 TABLET, FILM COATED ORAL DAILY
Status: DISCONTINUED | OUTPATIENT
Start: 2020-11-04 | End: 2020-11-06 | Stop reason: HOSPADM

## 2020-11-04 RX ORDER — PROCHLORPERAZINE 25 MG
12.5 SUPPOSITORY, RECTAL RECTAL EVERY 12 HOURS PRN
Status: DISCONTINUED | OUTPATIENT
Start: 2020-11-04 | End: 2020-11-06 | Stop reason: HOSPADM

## 2020-11-04 RX ORDER — PROCHLORPERAZINE MALEATE 5 MG
5 TABLET ORAL EVERY 6 HOURS PRN
Status: DISCONTINUED | OUTPATIENT
Start: 2020-11-04 | End: 2020-11-06 | Stop reason: HOSPADM

## 2020-11-04 RX ADMIN — ONDANSETRON 4 MG: 2 INJECTION INTRAMUSCULAR; INTRAVENOUS at 02:50

## 2020-11-04 RX ADMIN — OMEPRAZOLE 20 MG: 20 CAPSULE, DELAYED RELEASE ORAL at 08:22

## 2020-11-04 RX ADMIN — ACETAMINOPHEN 500 MG: 500 TABLET, FILM COATED ORAL at 12:42

## 2020-11-04 RX ADMIN — SERTRALINE HYDROCHLORIDE 100 MG: 100 TABLET ORAL at 14:48

## 2020-11-04 RX ADMIN — ISOSORBIDE DINITRATE 10 MG: 10 TABLET ORAL at 08:22

## 2020-11-04 RX ADMIN — CALCIUM 500 MG: 500 TABLET ORAL at 08:23

## 2020-11-04 RX ADMIN — Medication 2.5 MG: at 08:23

## 2020-11-04 RX ADMIN — ISOSORBIDE DINITRATE 10 MG: 10 TABLET ORAL at 14:48

## 2020-11-04 RX ADMIN — CARVEDILOL 12.5 MG: 12.5 TABLET, FILM COATED ORAL at 08:23

## 2020-11-04 RX ADMIN — ACETAMINOPHEN 500 MG: 500 TABLET, FILM COATED ORAL at 23:49

## 2020-11-04 RX ADMIN — POLYETHYLENE GLYCOL 3350 17 G: 17 POWDER, FOR SOLUTION ORAL at 18:10

## 2020-11-04 RX ADMIN — GABAPENTIN 200 MG: 100 CAPSULE ORAL at 08:23

## 2020-11-04 RX ADMIN — AMLODIPINE BESYLATE 10 MG: 10 TABLET ORAL at 08:22

## 2020-11-04 RX ADMIN — ACETAMINOPHEN 500 MG: 500 TABLET, FILM COATED ORAL at 18:09

## 2020-11-04 RX ADMIN — GABAPENTIN 200 MG: 100 CAPSULE ORAL at 19:52

## 2020-11-04 RX ADMIN — MICAFUNGIN SODIUM 100 MG: 50 INJECTION, POWDER, LYOPHILIZED, FOR SOLUTION INTRAVENOUS at 03:36

## 2020-11-04 RX ADMIN — LISINOPRIL 30 MG: 20 TABLET ORAL at 08:22

## 2020-11-04 RX ADMIN — CARVEDILOL 25 MG: 25 TABLET, FILM COATED ORAL at 21:59

## 2020-11-04 RX ADMIN — CLONIDINE HYDROCHLORIDE 0.1 MG: 0.1 TABLET ORAL at 19:53

## 2020-11-04 RX ADMIN — DOCUSATE SODIUM 50 MG AND SENNOSIDES 8.6 MG 1 TABLET: 8.6; 5 TABLET, FILM COATED ORAL at 08:22

## 2020-11-04 RX ADMIN — POLYETHYLENE GLYCOL 3350 17 G: 17 POWDER, FOR SOLUTION ORAL at 08:23

## 2020-11-04 RX ADMIN — PROCHLORPERAZINE EDISYLATE 5 MG: 5 INJECTION INTRAMUSCULAR; INTRAVENOUS at 03:37

## 2020-11-04 RX ADMIN — Medication 2.5 MG: at 19:53

## 2020-11-04 RX ADMIN — ISOSORBIDE DINITRATE 10 MG: 10 TABLET ORAL at 19:53

## 2020-11-04 RX ADMIN — DOCUSATE SODIUM 100 MG: 100 CAPSULE, LIQUID FILLED ORAL at 21:59

## 2020-11-04 RX ADMIN — CALCIUM 500 MG: 500 TABLET ORAL at 19:52

## 2020-11-04 RX ADMIN — Medication 2.5 MG: at 14:48

## 2020-11-04 NOTE — PLAN OF CARE
"BP elevated in the 160s- scheduled meds given, on 1L of O2 at beginning of shift pt was 88-89% on RA. Was able to wean pt late evening, afebrile. Disoriented to time. Pt very tired this evening. Up in chair for dinner while son here visiting. Encouraged a bed bath/ or shower and to ambulate in hallway pt declined, \"I'm just so tired\". Little appetite for dinner. Plan- urology, infectious disease and social work following, blood cultures repeated- still pending.     "

## 2020-11-04 NOTE — PROGRESS NOTES
[facility] Urology Progress Note          Assessment and Plan:   Principal Problem:    Ureteral stone    Assessment: Fungemia    Plan: ID consult appreciated  Appreciate wonderful care from the Hospitalist Service.   Blood cultures pend (repeated).  Should never be put back on Keflex prophy (or any prophy abx).     Will arrange for stent removal in the office. Please call if urologic concerns.  Bonita Whitaker PA-C  [facility] Urology  [phone]               Interval History:   doing well, voiding              Review of Systems:   The 5 point Review of Systems is negative other than noted in the HPI             Medications:     Current Facility-Administered Medications Ordered in Epic   Medication Dose Route Frequency Last Rate Last Dose     acetaminophen (TYLENOL) tablet 500 mg  500 mg Oral TID   500 mg at 11/04/20 1242     acetaminophen (TYLENOL) tablet 650 mg  650 mg Oral Q4H PRN         amLODIPine (NORVASC) tablet 10 mg  10 mg Oral Daily   10 mg at 11/04/20 0822     calcium carbonate 500 mg (elemental) (OSCAL) tablet 500 mg  500 mg Oral BID   500 mg at 11/04/20 0823     carvedilol (COREG) tablet 12.5 mg  12.5 mg Oral QAM   12.5 mg at 11/04/20 0823     carvedilol (COREG) tablet 25 mg  25 mg Oral At Bedtime   25 mg at 11/03/20 2136     cloNIDine (CATAPRES) tablet 0.1 mg  0.1 mg Oral BID   0.1 mg at 10/30/20 2141     docusate sodium (COLACE) capsule 100 mg  100 mg Oral At Bedtime   100 mg at 11/03/20 2135     gabapentin (NEURONTIN) capsule 200 mg  200 mg Oral BID   200 mg at 11/04/20 0823     isosorbide dinitrate (ISORDIL) tablet 10 mg  10 mg Oral TID   10 mg at 11/04/20 0822     lidocaine (LMX4) cream   Topical Q1H PRN         lidocaine 1 % 0.1-1 mL  0.1-1 mL Other Q1H PRN         lisinopril (ZESTRIL) tablet 30 mg  30 mg Oral Daily   30 mg at 11/04/20 0822     melatonin tablet 1 mg  1 mg Oral At Bedtime PRN         micafungin (MYCAMINE) 100 mg in sodium chloride 0.9 % 100 mL intermittent infusion   100 mg Intravenous Q24H 100 mL/hr at 11/04/20 0336 100 mg at 11/04/20 0336     naloxone (NARCAN) injection 0.1-0.4 mg  0.1-0.4 mg Intravenous Q2 Min PRN         omeprazole (priLOSEC) CR capsule 20 mg  20 mg Oral Daily   20 mg at 11/04/20 0822     ondansetron (ZOFRAN) injection 4 mg  4 mg Intravenous Q8H PRN   4 mg at 11/04/20 0250     ondansetron (ZOFRAN-ODT) ODT tab 4 mg  4 mg Oral Q6H PRN   4 mg at 11/02/20 0404     oxyCODONE IR (ROXICODONE) half-tab 2.5 mg  2.5 mg Oral TID   2.5 mg at 11/04/20 0823     polyethylene glycol (MIRALAX) Packet 17 g  17 g Oral Daily   17 g at 11/04/20 0823     polyethylene glycol (MIRALAX) Packet 17 g  17 g Oral Daily PRN         prochlorperazine (COMPAZINE) injection 5 mg  5 mg Intravenous Q6H PRN   5 mg at 11/04/20 0337    Or     prochlorperazine (COMPAZINE) tablet 5 mg  5 mg Oral Q6H PRN        Or     prochlorperazine (COMPAZINE) suppository 12.5 mg  12.5 mg Rectal Q12H PRN         senna-docusate (SENOKOT-S/PERICOLACE) 8.6-50 MG per tablet 1 tablet  1 tablet Oral BID   1 tablet at 11/04/20 0822    Or     senna-docusate (SENOKOT-S/PERICOLACE) 8.6-50 MG per tablet 2 tablet  2 tablet Oral BID         senna-docusate (SENOKOT-S/PERICOLACE) 8.6-50 MG per tablet 1 tablet  1 tablet Oral BID PRN        Or     senna-docusate (SENOKOT-S/PERICOLACE) 8.6-50 MG per tablet 2 tablet  2 tablet Oral BID PRN         sodium chloride (PF) 0.9% PF flush 3 mL  3 mL Intracatheter q1 min prn         sodium chloride (PF) 0.9% PF flush 3 mL  3 mL Intracatheter Q8H   3 mL at 11/04/20 1243     No current Epic-ordered outpatient medications on file.                  Physical Exam:   Vitals were reviewed  Patient Vitals for the past 8 hrs:   BP Temp Temp src Pulse Resp SpO2   11/04/20 1154 130/51 97.2  F (36.2  C) Oral 64 16 90 %   11/04/20 1009 -- -- -- -- -- 92 %   11/04/20 0731 (!) 174/68 95.9  F (35.5  C) Oral 76 16 92 %   11/04/20 0651 (!) 179/72 -- -- -- -- --   11/04/20 0622 (!) 191/78 -- -- 75 -- 93 %    11/04/20 0547 (!) 170/59 96.5  F (35.8  C) -- 72 -- 97 %     GEN: NAD, laying in bed  EYES: EOMI  MOUTH: MMM  NECK: Supple  RESP: Unlabored breathing             Data:     Lab Results   Component Value Date    NTBNPI 332 05/06/2020    NTBNPI 522 06/23/2011     Lab Results   Component Value Date    WBC 4.2 11/04/2020    WBC 4.4 11/03/2020    WBC 6.5 11/02/2020    HGB 9.4 (L) 11/04/2020    HGB 9.5 (L) 11/03/2020    HGB 9.9 (L) 11/02/2020    HCT 29.5 (L) 11/04/2020    HCT 29.9 (L) 11/03/2020    HCT 31.3 (L) 11/02/2020    MCV 98 11/04/2020    MCV 99 11/03/2020     (H) 11/02/2020     (L) 11/04/2020    PLT 83 (L) 11/03/2020    PLT 84 (L) 11/02/2020     Lab Results   Component Value Date    INR 1.13 05/06/2020    INR 1.18 (H) 08/28/2019    INR 1.18 (H) 06/20/2017

## 2020-11-04 NOTE — PROGRESS NOTES
Vitals: /51 (BP Location: Left arm)   Pulse 64   Temp 97.2  F (36.2  C) (Oral)   Resp 16   Ht 1.524 m (5')   Wt 57.6 kg (127 lb)   SpO2 90%   BMI 24.80 kg/m       Neuro: WDL. Sleepy for most of the morning  Cardiac: WDL  Lungs: WDL intermittent cough  GI: lower abdominal pain. NO BM today. Scheduled stool softeners given.  : WDL  Pain: scheduled Oxycodone and Tylenol given   IV: saline locked between Micafungin Q24 hrs  Labs/Imaging: BC from 11/2/2020 negative so far. Final BC pending  Diet: regular diet. Poor appetite. Needs encouragement to order food and eat   Activity: assist of x1 with a walker and galt belt  Consult: urology, ID,PT, SW  Plan: continue IV Micafungin. Ambulate at least three times a day. Encourage fluid.

## 2020-11-04 NOTE — PROGRESS NOTES
[facility]    Medicine Progress Note - Hospitalist Service       Date of Admission:  10/30/2020  Assessment & Plan         Daya Ignacio is a 88 year old female with past medical history of hypertension, chronic anemia, lumbar compression fractures with chronic back pain, multiple hospitalizations UTI (VRE, ESBL), history of C. Difficile, diverticular GI bleed, CAD (STEMI 5/2017), CHF (with preserved EF), depression, neuropathy, hospitalization in May 2020 with obstructing stone, UTI with left stent placement/lithotripsy. Admitted 10/30 for delayed stent removal (was lost to follow-up). Hospitalist service consulted for hypotension on 10/31 am. On antibiotics for UTI/sepsis. Now growing yeast in blood. On micafungin. Growing Candida glabrata.    Fungemia    - prelim blood cultures from 10/31 growing Candida glabrata, pending sensitivities    - on micafungin (received 3 doses)    - repeated blood cultures 11/2: negative so far    - urine only grew Proteus and Enterococcus (<10,000)    - Dr. Camejo is following    - LFTs normal (will need re-check at some point due to micafungin)    - ECHO ordered overnight 2 days ago (not clear why): no concerning findings    - will need to continue to wait on blood cultures from 11/2 and then pace midline if remains negative (tomorrow?)    Possible UTI with sepsis     - has grown Citrobacter (>100K), Proteus 10-50K), VRE 10-50K) in May     - was on oral keflex and cipro    - discontinued keflex and continued cipro (received 2 doses over 2 days)    - added linezolid for possible VRE (received 4 doses)    - urine cultures are final today and are growing <10,000 Protes/Enterococcus     - stopped cipro and linezolid on 11/2    - I did speak to the son regarding not using prophylactic antibiotics in this patient (was on keflex, prescribed by Nuria?)    Hypotension (in the setting of baseline HTN)    - resolved    - patient was hypertensive on 10/30 and  "received: carvedilol 25mg at 2140, clonidine 0.1mg at 2140, isosorbide 10mg at 2240, and IV labetalol 10mg at 1745, 1900, 1930, and 2015)    - then on am of 10/31 was hypotensive into the 70's    - responded to IVF    - BPs now elevated today    - resumed pta carvedilol and amlodipine 11/2    - resumed pt imdur and lisinopril 11/3    - will restart pta clonidine today     Nocturnal hypoxia    - will desat to high 80's at night and oxygen is placed    - able to titrate off each morning    - likely her baseline    Nausea    - had a little nausea yesterday and some this am    - currently resolved    - may be secondary to micafungin    - PRN meds    - eating    Question of aspiration evening of 10/31    - as per nursing patient \"choked\" while eating    - patient states it was the green beans    - typically does not have issues eating    - CXR last night was negative    - no issues today      Acute renal failure    - Cr was 1.2     - holding pta lisinopril    - resolved    - discontinued IVF as patient is eating and drinking well    POD #5 L ureteral stent exchange/lithotripsy    - vallecillo was discontinued    Thrombocytopenia    - likely reactive    - will monitor    Hypokalemia    - resolved    History CAD with STEMI in 5/2017    - holding Imdur     - resumed pta carvedilol    - not on asa as outpt    Depression    - restart sertraline (was held due to interaction with linezolid)    Chronic pain    - cont PT oxy    GERD    - cont pta PPI    NO LABS NEEDED IN AM TOMORROW    Will increase ambulation today    Seen by PT, will be able to discharge back to her apartment     Called son and updated him    Full Code changed DNR/DNI: confirmed with patient and son       Diet: Combination Diet Regular Diet Adult    DVT Prophylaxis: thrombocytopenia, stop heparin, add scds  Vallecillo Catheter: not present  Code Status: No CPR- Do NOT Intubate           Disposition Plan   Expected discharge: 1-2 days, recommended to prior living " arrangement once SIRS/Sepsis treated.  Entered: Juliano Mckeon MD 11/04/2020, 9:49 AM       The patient's care was discussed with the Bedside Nurse, Patient, Patient's Family and Urology Consultant.    Juliano Mckeon MD  Hospitalist Service  [facility]  Contact information available via [facility] Paging/Directory    ______________________________________________________________________    Interval History   Patient in bed. States she had some nausea this am. No vomiting. She is feeling better and has ordered breakfast. No chest pain, sob    Data reviewed today: I reviewed all medications, new labs and imaging results over the last 24 hours. I personally reviewed no images or EKG's today.    Physical Exam   Vital Signs: Temp: 95.9  F (35.5  C) Temp src: Oral BP: (!) 174/68 Pulse: 76   Resp: 16 SpO2: 92 % O2 Device: Nasal cannula Oxygen Delivery: 2 LPM  Weight: 127 lbs 0 oz  Constitutional: awake, alert, cooperative, no apparent distress, and appears stated age  Eyes: Lids and lashes normal, pupils equal, round and reactive to light, extra ocular muscles intact, sclera clear, conjunctiva normal  ENT: Normocephalic, without obvious abnormality, atraumatic, sinuses nontender on palpation, external ears without lesions, oral pharynx with moist mucous membranes, tonsils without erythema or exudates, gums normal and good dentition.  Respiratory: No increased work of breathing, good air exchange, clear to auscultation bilaterally, no crackles or wheezing  Cardiovascular: Normal apical impulse, regular rate and rhythm, normal S1 and S2, no S3 or S4, and no murmur noted  GI: No scars, normal bowel sounds, soft, non-distended, non-tender, no masses palpated, no hepatosplenomegally  Skin: no bruising or bleeding  Musculoskeletal: no lower extremity pitting edema present      Data   Recent Labs   Lab 11/04/20  0530 11/03/20  0511 11/02/20  0707 11/01/20  0632   WBC 4.2 4.4 6.5 8.5   HGB 9.4* 9.5* 9.9* 9.6*    MCV 98 99 101* 100   * 83* 84* 96*   NA  --  136 136 135   POTASSIUM  --  3.7 4.0 4.3   CHLORIDE  --  104 104 105   CO2  --  28 26 24   BUN  --  9 11 24   CR  --  0.51* 0.54 0.94   ANIONGAP  --  4 6 6   DAVE  --  8.6 8.6 8.4*   GLC  --  111* 111* 93   ALBUMIN  --  2.6*  --   --    PROTTOTAL  --  6.2*  --   --    BILITOTAL  --  0.6  --   --    ALKPHOS  --  144  --   --    ALT  --  22  --   --    AST  --  30  --   --      No results found for this or any previous visit (from the past 24 hour(s)).

## 2020-11-04 NOTE — PROGRESS NOTES
Care Management Follow Up    Length of Stay (days): 4    Expected Discharge Date: 11/04/20     Concerns to be Addressed: all concerns addressed in this encounter     Patient plan of care discussed at interdisciplinary rounds: Yes    Anticipated Discharge Disposition: Assisted Living     Anticipated Discharge DME: Der[facility]    Patient/family educated on Medicare website which has current facility and service quality ratings: yes  Private pay costs discussed: Not applicable    Additional Information:  Spoke with Alistair, director at [facility] is not able to accommodate IV ABX. Patient does not meet criteria for TCU as therapy is recommending discharge home.   Awaiting final ABX plan and culture results.   CM will continue to follow for discharge planning.  Patient is not able to discharge to [facility] with IV ABX.       Isabel Rowland, ROBERTA Rowland MA/RN Case Manager  Inpatient Care Coordination  [facility]   [phone]

## 2020-11-04 NOTE — PROGRESS NOTES
[facility]  Infectious Disease Progress Note          Assessment and Plan:   IMPRESSION:   1.  A 89-year-old female, well known with recurrent UTIs, currently admitted with low blood pressure, longstanding stent in place, probable UTI although, of note, urine without major pathogens.  Stent has now been replaced.   2.  Candidemia at admission is current active infection, presumably from the urine, urine was not worked up, but had the usual jorge, which might be Candida, so will have them evaluate.   3.  History of recurrent UTIs with numerous resistant organisms, also chronic symptoms are likely not UTI related.   4.  History of C. diff in 2019, not since.   5.  VRE and ESBL historical infections including recent.   6.  Bilateral total hip arthroplasties.  No evidence of involvement so far.   7.  Mild renal dysfunction.   8.  NITROFURANTOIN, SULFA AND PENICILLIN ALLERGIES.      RECOMMENDATIONS:   1.  Continue micafungin, serial blood cultures until clear.   2.  Watch for any secondary sites including and especially her hips or eyes.  Hopefully, will clear quickly , but in 2 ways problematic, not in UC so source not completely clear (still likely urine), and more sig is C glabrata so will not be sens flucon at acceptable level  3.  This current organism is just further evidence that prophylaxis is absolutely not indicated here.  Numerous resistant organisms being created, no likely value whatsoever.  Would not do prophylaxis in this patient.  Await Follow-up cxs, follow sxs, await sens no added ABX for UC no long line   Seems better if neg still tomorrow maybe midline and disposition planning, soc sx help           Interval History:   no new complaints malaise and nausea no fever Bc is C glabrata UC no candida.               Medications:       acetaminophen  500 mg Oral TID     amLODIPine  10 mg Oral Daily     calcium carbonate 500 mg (elemental)  500 mg Oral BID     carvedilol  12.5 mg Oral  QAM     carvedilol  25 mg Oral At Bedtime     cloNIDine  0.1 mg Oral BID     docusate sodium  100 mg Oral At Bedtime     gabapentin  200 mg Oral BID     isosorbide dinitrate  10 mg Oral TID     lisinopril  30 mg Oral Daily     micafungin  100 mg Intravenous Q24H     omeprazole  20 mg Oral Daily     oxyCODONE  2.5 mg Oral TID     polyethylene glycol  17 g Oral Daily     senna-docusate  1 tablet Oral BID    Or     senna-docusate  2 tablet Oral BID     sodium chloride (PF)  3 mL Intracatheter Q8H                  Physical Exam:   Blood pressure 130/51, pulse 64, temperature 97.2  F (36.2  C), temperature source Oral, resp. rate 16, height 1.524 m (5'), weight 57.6 kg (127 lb), SpO2 90 %, not currently breastfeeding.  Wt Readings from Last 2 Encounters:   10/30/20 57.6 kg (127 lb)   09/30/20 56.2 kg (124 lb)     Vital Signs with Ranges  Temp:  [95.9  F (35.5  C)-97.4  F (36.3  C)] 97.2  F (36.2  C)  Pulse:  [64-83] 64  Resp:  [16-24] 16  BP: (130-191)/(51-78) 130/51  SpO2:  [86 %-97 %] 90 %    Constitutional: Awake, alert, cooperative, no apparent distress   Lungs: Clear to auscultation bilaterally, no crackles or wheezing   Cardiovascular: Regular rate and rhythm, normal S1 and S2, and no murmur noted   Abdomen: Normal bowel sounds, soft, non-distended, non-tender   Skin: No rashes, no cyanosis, no edema   Other:           Data:   All microbiology laboratory data reviewed.  Recent Labs   Lab Test 11/04/20  0530 11/03/20  0511 11/02/20  0707   WBC 4.2 4.4 6.5   HGB 9.4* 9.5* 9.9*   HCT 29.5* 29.9* 31.3*   MCV 98 99 101*   * 83* 84*     Recent Labs   Lab Test 11/03/20  0511 11/02/20  0707 11/01/20  0632   CR 0.51* 0.54 0.94     No lab results found.  Recent Labs   Lab Test 11/02/20  0819 11/02/20  0817 10/31/20  1130 10/31/20  0941 10/31/20  0938 05/09/20  0944 05/09/20  0920 05/08/20  1538 05/08/20  1533   CULT No growth after 2 days No growth after 2 days <10,000 colonies/mL  Proteus mirabilis  *  <10,000  colonies/mL  Enterococcus faecium  *  <10,000 colonies/mL  Strain 2  Enterococcus faecium  *  ID requested by Dr. Camejo on 11.2.20 at 1311. bw  Culture in progress Cultured on the 1st day of incubation:  Candida glabrata complex  *  Critical Value/Significant Value, preliminary result only, called to and read back by  Elke Peña RN at 0155 11.2.20. amd    Susceptibility testing in progress Cultured on the 1st day of incubation:  Candida glabrata complex  *  Critical Value/Significant Value, preliminary result only, called to and read back by  Elke Peña RN 11/2/20 @ 0250 TF    Susceptibility testing done on previous specimen No growth No growth No growth No growth

## 2020-11-04 NOTE — PROGRESS NOTES
"Patient is alert and oriented x4. VSS, elevated BP. Rating abd pain 8 out of 10 and reporting nausea. Given IV Zofran and Compazine with some relief. Patient stated she had no more pain, \"just an upset stomach.\" blood cultures showing fungi, Mycamine Q24h. Skin tear in groin area. Up with assist of 1 in room. SW, ID and urology following. Repeat blood cultures.   "

## 2020-11-05 ENCOUNTER — APPOINTMENT (OUTPATIENT)
Dept: PHYSICAL THERAPY | Facility: CLINIC | Age: 85
DRG: 659 | End: 2020-11-05
Attending: UROLOGY
Payer: COMMERCIAL

## 2020-11-05 LAB
BACTERIA SPEC CULT: ABNORMAL
Lab: ABNORMAL
SPECIMEN SOURCE: ABNORMAL

## 2020-11-05 PROCEDURE — 250N000013 HC RX MED GY IP 250 OP 250 PS 637: Performed by: HOSPITALIST

## 2020-11-05 PROCEDURE — 97116 GAIT TRAINING THERAPY: CPT | Mod: GP | Performed by: PHYSICAL THERAPIST

## 2020-11-05 PROCEDURE — 250N000013 HC RX MED GY IP 250 OP 250 PS 637: Performed by: INTERNAL MEDICINE

## 2020-11-05 PROCEDURE — 120N000004 HC R&B MS OVERFLOW

## 2020-11-05 PROCEDURE — 97530 THERAPEUTIC ACTIVITIES: CPT | Mod: GP | Performed by: PHYSICAL THERAPIST

## 2020-11-05 PROCEDURE — 258N000003 HC RX IP 258 OP 636: Performed by: INTERNAL MEDICINE

## 2020-11-05 PROCEDURE — 250N000013 HC RX MED GY IP 250 OP 250 PS 637: Performed by: UROLOGY

## 2020-11-05 PROCEDURE — 99233 SBSQ HOSP IP/OBS HIGH 50: CPT | Performed by: INTERNAL MEDICINE

## 2020-11-05 PROCEDURE — 250N000011 HC RX IP 250 OP 636: Performed by: INTERNAL MEDICINE

## 2020-11-05 RX ORDER — FLUCONAZOLE 200 MG/1
400 TABLET ORAL DAILY
Status: DISCONTINUED | OUTPATIENT
Start: 2020-11-05 | End: 2020-11-06 | Stop reason: HOSPADM

## 2020-11-05 RX ADMIN — GABAPENTIN 200 MG: 100 CAPSULE ORAL at 20:17

## 2020-11-05 RX ADMIN — AMLODIPINE BESYLATE 10 MG: 10 TABLET ORAL at 09:07

## 2020-11-05 RX ADMIN — LISINOPRIL 30 MG: 20 TABLET ORAL at 09:08

## 2020-11-05 RX ADMIN — Medication 2.5 MG: at 15:39

## 2020-11-05 RX ADMIN — POLYETHYLENE GLYCOL 3350 17 G: 17 POWDER, FOR SOLUTION ORAL at 09:06

## 2020-11-05 RX ADMIN — ISOSORBIDE DINITRATE 10 MG: 10 TABLET ORAL at 15:39

## 2020-11-05 RX ADMIN — FLUCONAZOLE 400 MG: 200 TABLET ORAL at 12:30

## 2020-11-05 RX ADMIN — MICAFUNGIN SODIUM 100 MG: 50 INJECTION, POWDER, LYOPHILIZED, FOR SOLUTION INTRAVENOUS at 03:37

## 2020-11-05 RX ADMIN — OMEPRAZOLE 20 MG: 20 CAPSULE, DELAYED RELEASE ORAL at 09:08

## 2020-11-05 RX ADMIN — Medication 2.5 MG: at 09:08

## 2020-11-05 RX ADMIN — DOCUSATE SODIUM 50 MG AND SENNOSIDES 8.6 MG 1 TABLET: 8.6; 5 TABLET, FILM COATED ORAL at 09:07

## 2020-11-05 RX ADMIN — ISOSORBIDE DINITRATE 10 MG: 10 TABLET ORAL at 09:08

## 2020-11-05 RX ADMIN — CLONIDINE HYDROCHLORIDE 0.1 MG: 0.1 TABLET ORAL at 09:08

## 2020-11-05 RX ADMIN — CLONIDINE HYDROCHLORIDE 0.1 MG: 0.1 TABLET ORAL at 20:15

## 2020-11-05 RX ADMIN — GABAPENTIN 200 MG: 100 CAPSULE ORAL at 09:08

## 2020-11-05 RX ADMIN — CALCIUM 500 MG: 500 TABLET ORAL at 20:15

## 2020-11-05 RX ADMIN — CARVEDILOL 25 MG: 25 TABLET, FILM COATED ORAL at 21:57

## 2020-11-05 RX ADMIN — CARVEDILOL 12.5 MG: 12.5 TABLET, FILM COATED ORAL at 09:08

## 2020-11-05 RX ADMIN — ISOSORBIDE DINITRATE 10 MG: 10 TABLET ORAL at 20:17

## 2020-11-05 RX ADMIN — CALCIUM 500 MG: 500 TABLET ORAL at 09:07

## 2020-11-05 RX ADMIN — ACETAMINOPHEN 500 MG: 500 TABLET, FILM COATED ORAL at 12:30

## 2020-11-05 RX ADMIN — SERTRALINE HYDROCHLORIDE 100 MG: 100 TABLET ORAL at 09:08

## 2020-11-05 RX ADMIN — ACETAMINOPHEN 500 MG: 500 TABLET, FILM COATED ORAL at 20:16

## 2020-11-05 RX ADMIN — Medication 2.5 MG: at 20:16

## 2020-11-05 NOTE — PROGRESS NOTES
[facility]  Infectious Disease Progress Note          Assessment and Plan:   IMPRESSION:   1.  A 89-year-old female, well known with recurrent UTIs, currently admitted with low blood pressure, longstanding stent in place, probable UTI although, of note, urine without major pathogens.  Stent has now been replaced.   2.  Candidemia at admission is current active infection, presumably from the urine, urine was not worked up, but had the usual jorge, which might be Candida, so will have them evaluate.   3.  History of recurrent UTIs with numerous resistant organisms, also chronic symptoms are likely not UTI related.   4.  History of C. diff in 2019, not since.   5.  VRE and ESBL historical infections including recent.   6.  Bilateral total hip arthroplasties.  No evidence of involvement so far.   7.  Mild renal dysfunction.   8.  NITROFURANTOIN, SULFA AND PENICILLIN ALLERGIES.      RECOMMENDATIONS:   1.  No fevr, no focal sxs, serial blood cultures are clear.   2.  Watch for any secondary sites including and especially her hips or eyes.  very luckily ALFRED 8 makes high dose flucon acceptable, to that 2 more weeks po flucon 400 daily, added advantage of urine penetration for presumed source  3.  This current organism is just further evidence that prophylaxis is absolutely not indicated here.  Numerous resistant organisms being created, no likely value whatsoever.  Would not do prophylaxis in this patient.  Await Follow-up cxs, follow sxs, await sens no added ABX for UC no long line   OK home po flucon, if more fever, eye sx etc seek med attention, Follow-up BC times 2 desirable about 1 week after flucon done          Interval History:   no new complaints malaise and nausea better no fever Bc is C glabrata UC no candida.  ALFRED only 8              Medications:       acetaminophen  500 mg Oral TID     amLODIPine  10 mg Oral Daily     calcium carbonate 500 mg (elemental)  500 mg Oral BID     carvedilol   12.5 mg Oral QAM     carvedilol  25 mg Oral At Bedtime     cloNIDine  0.1 mg Oral BID     docusate sodium  100 mg Oral At Bedtime     fluconazole  400 mg Oral Daily     gabapentin  200 mg Oral BID     isosorbide dinitrate  10 mg Oral TID     lisinopril  30 mg Oral Daily     omeprazole  20 mg Oral Daily     oxyCODONE  2.5 mg Oral TID     polyethylene glycol  17 g Oral Daily     senna-docusate  1 tablet Oral BID    Or     senna-docusate  2 tablet Oral BID     sertraline  100 mg Oral Daily     sodium chloride (PF)  3 mL Intracatheter Q8H                  Physical Exam:   Blood pressure (!) 149/67, pulse 64, temperature 96.7  F (35.9  C), temperature source Oral, resp. rate 16, height 1.524 m (5'), weight 57.6 kg (127 lb), SpO2 98 %, not currently breastfeeding.  Wt Readings from Last 2 Encounters:   10/30/20 57.6 kg (127 lb)   09/30/20 56.2 kg (124 lb)     Vital Signs with Ranges  Temp:  [96.7  F (35.9  C)-97.9  F (36.6  C)] 96.7  F (35.9  C)  Pulse:  [58-76] 64  Resp:  [16] 16  BP: (107-149)/(38-67) 149/67  SpO2:  [90 %-98 %] 98 %    Constitutional: Awake, alert, cooperative, no apparent distress   Lungs: Clear to auscultation bilaterally, no crackles or wheezing   Cardiovascular: Regular rate and rhythm, normal S1 and S2, and no murmur noted   Abdomen: Normal bowel sounds, soft, non-distended, non-tender   Skin: No rashes, no cyanosis, no edema   Other:           Data:   All microbiology laboratory data reviewed.  Recent Labs   Lab Test 11/04/20  0530 11/03/20  0511 11/02/20  0707   WBC 4.2 4.4 6.5   HGB 9.4* 9.5* 9.9*   HCT 29.5* 29.9* 31.3*   MCV 98 99 101*   * 83* 84*     Recent Labs   Lab Test 11/03/20  0511 11/02/20  0707 11/01/20  0632   CR 0.51* 0.54 0.94     No lab results found.  Recent Labs   Lab Test 11/02/20  0819 11/02/20  0817 10/31/20  1130 10/31/20  0941 10/31/20  0938 05/09/20  0944 05/09/20  0920 05/08/20  1538 05/08/20  1533   CULT No growth after 3 days No growth after 3 days <10,000  colonies/mL  Proteus mirabilis  *  <10,000 colonies/mL  Enterococcus faecium  *  <10,000 colonies/mL  Strain 2  Enterococcus faecium  *  ID requested by Dr. Camejo on 11.2.20 at 1311. bw  Culture in progress Cultured on the 1st day of incubation:  Candida glabrata complex  *  Critical Value/Significant Value, preliminary result only, called to and read back by  Elke Peña RN at 0155 11.2.20. amd   Cultured on the 1st day of incubation:  Candida glabrata complex  *  Critical Value/Significant Value, preliminary result only, called to and read back by  Elke Peña RN 11/2/20 @ 0250 TF    Susceptibility testing done on previous specimen No growth No growth No growth No growth

## 2020-11-05 NOTE — PLAN OF CARE
Patient alert and oriented. Vitals are Temp: 96.8  F (36  C) Temp src: Oral BP: (!) 107/38 Pulse: 58   Resp: 16 SpO2: 98 % 2 LPM with rest. Up with standby assist with gait belt and walker. Tolerating regular diet; denies nausea. Lung sounds clear, infrequent cough. Cardiac WNL. Continuing with IV Mycamine, SL between doses. Contact isolation maintained. ID, Urology, PT, and SW consulted. Continuing with supportive cares and symptom management.

## 2020-11-05 NOTE — PLAN OF CARE
Blood pressure (!) 147/57, pulse 76, temperature 97.9  F (36.6  C), temperature source Oral, resp. rate 16, height 1.524 m (5'), weight 57.6 kg (127 lb), SpO2 91 %, not currently breastfeeding.    Neuro:       Patient alert, orientated, pleasant and cooperative  Cardiac:    WDL  Pulm:         LS clear, sats borderline.  Patient to wear 2 L  nc at night  GI:              Patient denies nausea or emesis.  Miralax given at patients                      Request as she feels she is constipated.  Patient had a                     Recorded BM yesterday  :            WDL  Pain:          Scheduled Oxycodone and tylenol given   Activity       Patient is a standby assist with personal walker and gait belt  Plan:          IV antibiotics to continue.  Encourage oral fluid intake.

## 2020-11-05 NOTE — PROGRESS NOTES
"[facility]  Hospitalist Progress Note  Patient Name: Daya Ignacio    MRN: [id number]  Provider: Andre Mcmanus MD  11/05/20             Assessment and Plan:      Summary:  Daya Ignacio is an 88 year old female with history of hypertension, chronic anemia, lumbar compression fractures with chronic back pain, multiple hospitalizations UTI (VRE, ESBL), history of C. Difficile, diverticular GI bleed, CAD (STEMI 5/2017), CHF (with preserved EF), depression, neuropathy, and hospitalization in May 2020 (with obstructing stone, UTI, and left ureter stent placement/lithotripsy). She was admitted on 10/30 for delayed stent removal (was lost to follow-up). Hospitalist service consulted for hypotension on 10/31 am. She was thought to be septic due to UTI. She was initially treated with  Keflex and Cipro. Urine culture ultimately grew < 10 000 colonies of proteus and 2 strains of enterococcus. Blood culture ultimately grew candida glabrata. She was treated with micafungin. Antibiotics were stopped due to low numbers of 3 different organisms (thought to be colonized and prophylactic antibiotics discourage going forward).      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  -was on broad spectrum antibiotics whuich were stopped on 11/2      Hypotension (in the setting of baseline HTN)  - continue pta carvedilol, amlodipine, imdur, clonidine, and lisinopril     Nocturnal hypoxia  - has been desaturating to high 80's at night and oxygen used  - try off O2 tonight and if fails, document for prescription of home O2     Nausea  - resolved     Question of aspiration evening of 10/31  - per nursing patient \"choked\" while eating  - no fever or dyspnea  - chest xray was negative      Acute renal failure  - resolved     POD #6 after left " ureteral stent exchange/lithotripsy  - doing well  - vallecillo was discontinued     Thrombocytopenia  - likely reactive     Hypokalemia  - resolved     History CAD with STEMI in 5/2017  - continue pta Coreg, lisinopril, and imdur  - not on asa as outpt     Depression  - continue pta sertraline (was held due to potential interaction with linezolid)     Chronic pain  - cont pta oxycodone     GERD  - cont pta prilosec         Diet: Combination Diet Regular Diet Adult    DVT Prophylaxis:  scds  Vallecillo Catheter: not present  Code Status: No CPR- Do NOT Intubate    Disposition:  Likely back to assisted living tomorrow on oral Fluconazole for 2 weeks. May need supplemental oxygen with sleep on discharge        Interval History:      No new problems. Feels well.                   Physical Exam:      Last Vital Signs:  /47 (BP Location: Left arm)   Pulse 62   Temp 96.3  F (35.7  C) (Oral)   Resp 18   Ht 1.524 m (5')   Wt 57.6 kg (127 lb)   SpO2 93%   BMI 24.80 kg/m    No intake or output data in the 24 hours ending 11/05/20 1527    GENERAL:  Comfortable. Cooperative.  PSYCH: pleasant, oriented, No acute distress.  EYES: PERRLA, Normal conjunctiva. Vision impaired.  HEART:  Regular rate and rhythm. No JVD. Pulses normal. No edema.  LUNGS:  Clear to auscultation, normal Respiratory effort.  ABDOMEN:  Soft, no hepatosplenomegaly, normal bowel sounds.  EXTREMETIES: No clubbing, cyanosis or ischemia  SKIN:  Dry to touch, No rash.           Medications:      All current medications were reviewed.         Data:      All new lab and imaging data was reviewed.   Labs:  Recent Labs   Lab 11/02/20  0819 11/02/20  0817 10/31/20  1130 10/31/20  0941 10/31/20  0938   CULT No growth after 3 days No growth after 3 days <10,000 colonies/mL  Proteus mirabilis  *  <10,000 colonies/mL  Enterococcus faecium  *  <10,000 colonies/mL  Strain 2  Enterococcus faecium  *  <10,000 colonies/mL  Candida glabrata complex  Susceptibility testing  not routinely done  *  ID requested by Dr. Camejo on 11.2.20 at 1311. bw Cultured on the 1st day of incubation:  Candida glabrata complex  *  Critical Value/Significant Value, preliminary result only, called to and read back by  Elke Peña RN at 0155 11.2.20. amd   Cultured on the 1st day of incubation:  Candida glabrata complex  *  Critical Value/Significant Value, preliminary result only, called to and read back by  Elke Peña RN 11/2/20 @ 0250 TF    Susceptibility testing done on previous specimen          Lab Results   Component Value Date     11/03/2020     11/02/2020     11/01/2020    Lab Results   Component Value Date    CHLORIDE 104 11/03/2020    CHLORIDE 104 11/02/2020    CHLORIDE 105 11/01/2020    Lab Results   Component Value Date    BUN 9 11/03/2020    BUN 11 11/02/2020    BUN 24 11/01/2020      Lab Results   Component Value Date    POTASSIUM 3.7 11/03/2020    POTASSIUM 4.0 11/02/2020    POTASSIUM 4.3 11/01/2020    Lab Results   Component Value Date    CO2 28 11/03/2020    CO2 26 11/02/2020    CO2 24 11/01/2020    Lab Results   Component Value Date    CR 0.51 11/03/2020    CR 0.54 11/02/2020    CR 0.94 11/01/2020        Recent Labs   Lab 11/04/20  0530 11/03/20  0511 11/02/20  0707   WBC 4.2 4.4 6.5   HGB 9.4* 9.5* 9.9*   HCT 29.5* 29.9* 31.3*   MCV 98 99 101*   * 83* 84*      Imaging:   Results for orders placed or performed during the hospital encounter of 10/30/20   XR Surgery BORIS L/T 5 Min Fluoro w Stills    Narrative    This exam was marked as non-reportable because it will not be read by a   radiologist or a [facility] non-radiologist provider.         XR Chest Port 1 View    Narrative    EXAM: XR CHEST PORT 1 VW  LOCATION: [facility]  DATE/TIME: 10/31/2020 6:50 PM    INDICATION: Shortness of breath. Suspected aspiration.  COMPARISON: 05/06/2020.      Impression    IMPRESSION: Minimal change. There is some linear density extending from left hilum most  suggestive of atelectasis or scar. No new focal consolidation or pleural effusion. Heart size normal.   Echocardiogram Complete    Narrative    [id number]  [id number]  [id number]  [id number]^MANDY^LANEY^TORY           [facility]  Echocardiography Laboratory  [address] [place] [address]        Name: MAGALY MCDANIELS  MRN: [id number]  : 10/11/1930  Study Date: 2020 07:32 AM  Age: 89 yrs  Gender: Female  Patient Location: [facility]  Reason For Study: Endocarditis  Ordering Physician: LANEY GALVAN  Referring Physician: Johann Hall  Performed By: Yani Sidhu RDCS     BSA: 1.5 m2  Height: 60 in  Weight: 127 lb  HR: 78  BP: 168/57 mmHg  _____________________________________________________________________________  __        Procedure  Complete Portable Echo Adult.  _____________________________________________________________________________  __        Interpretation Summary     The left ventricle is normal in size. Left ventricular systolic function is  normal. The visual ejection fraction is estimated at 60-65%. Grade I or early  diastolic dysfunction. Diastolic Doppler findings (E/E' ratio and/or other  parameters) suggest left ventricular filling pressures are increased.  The right ventricle is normal size. The right ventricular systolic function is  normal.  The left atrium is mildly dilated.  There is moderate mitral annular calcification. There is mild to moderate (1-  2+) mitral regurgitation.  No pericardial effusion.  In comparison to the previous report dated 2019, the findings are  similar. There is no evidence of a mass or vegetation. This does not rule out  endocarditis.  _____________________________________________________________________________  __        Left Ventricle  The left ventricle is normal in size. Left ventricular systolic function is  normal. The visual ejection fraction is estimated at 60-65%. Grade I or early  diastolic dysfunction. Diastolic Doppler  findings (E/E' ratio and/or other  parameters) suggest left ventricular filling pressures are increased.     Right Ventricle  The right ventricle is normal size. The right ventricular systolic function is  normal.     Atria  The left atrium is mildly dilated. Right atrial size is normal. There is no  color Doppler evidence of an atrial shunt.     Mitral Valve  The mitral valve leaflets are mildly thickened. There is moderate mitral  annular calcification. There is mild to moderate (1-2+) mitral regurgitation.        Tricuspid Valve  There is mild (1+) tricuspid regurgitation. The right ventricular systolic  pressure is approximated at 36.1 mmHg plus the right atrial pressure.     Aortic Valve  There is mild trileaflet aortic sclerosis. There is mild (1+) aortic  regurgitation. No aortic stenosis is present.     Pulmonic Valve  There is no pulmonic valvular stenosis.     Vessels  The aortic root is normal size. Normal size ascending aorta. The inferior vena  cava is normal.     Pericardium  There is no pericardial effusion.        Rhythm  Sinus rhythm was noted.  _____________________________________________________________________________  __  MMode/2D Measurements & Calculations  IVSd: 1.1 cm     LVIDd: 3.7 cm  LVIDs: 2.3 cm  LVPWd: 0.95 cm  FS: 36.9 %  LV mass(C)d: 116.4 grams  LV mass(C)dI: 75.6 grams/m2  Ao root diam: 3.0 cm  LA dimension: 4.0 cm  asc Aorta Diam: 2.9 cm  LA/Ao: 1.3  LA Volume (BP): 53.6 ml  LA Volume Index (BP): 34.8 ml/m2  RWT: 0.51           Doppler Measurements & Calculations  MV E max deuce: 73.2 cm/sec  MV A max deuce: 111.6 cm/sec  MV E/A: 0.66  MV max P.3 mmHg  MV mean PG: 3.6 mmHg  MV V2 VTI: 26.5 cm  MV dec time: 0.20 sec  PA acc time: 0.07 sec  TR max deuce: 300.4 cm/sec  TR max P.1 mmHg  E/E' av.0  Lateral E/e': 11.9  Medial E/e': 16.1           _____________________________________________________________________________  __           Report approved by: Feliberto Florentino  11/02/2020 09:00 AM

## 2020-11-05 NOTE — PROGRESS NOTES
Care Management Follow Up    Length of Stay (days): 5    Expected Discharge Date: 11/06/20     Concerns to be Addressed: all concerns addressed in this encounter     Patient plan of care discussed at interdisciplinary rounds: Yes    Anticipated Discharge Disposition: Assisted Living     Anticipated Discharge Services:  Potential need for IV treatment  Anticipated Discharge DME: Derick    Patient/family educated on Medicare website which has current facility and service quality ratings: yes  Education Provided on the Discharge Plan:  Yes  Patient/Family in Agreement with the Plan:  Yes, awaiting final medication plan    Referrals Placed by CM/SW:  None  Private pay costs discussed: Not applicable    Additional Information:  Met with patient at bedside to discuss possible need for IV therapy upon discharge. Awaiting cultures and final plan.   Notified patient that [facility] staff is not able to assist or accommodate IV therapy. Patient is not interested in home infusion with administering by self.  Patient states transportation for daily outpatient infusion is a barrier.  Patient is interested in TCU/ SNF for IV therapy as needed. Acceptance to TCU/ SNF and insurance may be a barrier. However, patient may have coverage with [employer].     CM will continue to follow for discharge planning pending final plan.     Update 1430: Received update from provider that patient will discharge on oral treatment. Spoke with nursing director, Alistair, at [facility]. Facility prefers a return on Friday between [phone]. Provider to fill one day of new medications in hospital upon discharge. Provider to physically sign discharge orders. Case management to fax signed discharge orders to [phone].       Isabel Rowland, ROBERTA Rowland MA/RN Case Manager  Inpatient Care Coordination  [facility]   [phone]

## 2020-11-05 NOTE — PLAN OF CARE
Physical Therapy Discharge Summary    Reason for therapy discharge:    All goals and outcomes met, no further needs identified.    Progress towards therapy goal(s). See goals on Care Plan in [facility] electronic health record for goal details.  Goals met   Patient able to ambulate 130 feet with 4WW and SBA.     Therapy recommendation(s):    No further therapy is recommended.

## 2020-11-06 VITALS
DIASTOLIC BLOOD PRESSURE: 60 MMHG | HEIGHT: 60 IN | WEIGHT: 127 LBS | RESPIRATION RATE: 18 BRPM | OXYGEN SATURATION: 93 % | BODY MASS INDEX: 24.94 KG/M2 | HEART RATE: 68 BPM | SYSTOLIC BLOOD PRESSURE: 163 MMHG | TEMPERATURE: 97.6 F

## 2020-11-06 LAB
APPEARANCE STONE: NORMAL
COMPN STONE: NORMAL
NUMBER STONE: NORMAL
PLATELET # BLD AUTO: 110 10E9/L (ref 150–450)
SIZE STONE: NORMAL MM
WT STONE: 127 MG

## 2020-11-06 PROCEDURE — 85049 AUTOMATED PLATELET COUNT: CPT | Performed by: UROLOGY

## 2020-11-06 PROCEDURE — 99239 HOSP IP/OBS DSCHRG MGMT >30: CPT | Performed by: INTERNAL MEDICINE

## 2020-11-06 PROCEDURE — 250N000013 HC RX MED GY IP 250 OP 250 PS 637: Performed by: HOSPITALIST

## 2020-11-06 PROCEDURE — 250N000013 HC RX MED GY IP 250 OP 250 PS 637: Performed by: INTERNAL MEDICINE

## 2020-11-06 PROCEDURE — 250N000013 HC RX MED GY IP 250 OP 250 PS 637: Performed by: UROLOGY

## 2020-11-06 PROCEDURE — 36415 COLL VENOUS BLD VENIPUNCTURE: CPT | Performed by: UROLOGY

## 2020-11-06 RX ORDER — SERTRALINE HYDROCHLORIDE 100 MG/1
100 TABLET, FILM COATED ORAL DAILY
COMMUNITY

## 2020-11-06 RX ORDER — FLUCONAZOLE 200 MG/1
400 TABLET ORAL DAILY
Qty: 28 TABLET | Refills: 0 | Status: SHIPPED | OUTPATIENT
Start: 2020-11-07 | End: 2020-11-21

## 2020-11-06 RX ADMIN — GABAPENTIN 200 MG: 100 CAPSULE ORAL at 07:50

## 2020-11-06 RX ADMIN — FLUCONAZOLE 400 MG: 200 TABLET ORAL at 07:50

## 2020-11-06 RX ADMIN — Medication 2.5 MG: at 07:50

## 2020-11-06 RX ADMIN — CLONIDINE HYDROCHLORIDE 0.1 MG: 0.1 TABLET ORAL at 07:50

## 2020-11-06 RX ADMIN — ACETAMINOPHEN 500 MG: 500 TABLET, FILM COATED ORAL at 06:53

## 2020-11-06 RX ADMIN — OMEPRAZOLE 20 MG: 20 CAPSULE, DELAYED RELEASE ORAL at 07:51

## 2020-11-06 RX ADMIN — ISOSORBIDE DINITRATE 10 MG: 10 TABLET ORAL at 07:50

## 2020-11-06 RX ADMIN — AMLODIPINE BESYLATE 10 MG: 10 TABLET ORAL at 07:50

## 2020-11-06 RX ADMIN — CARVEDILOL 12.5 MG: 12.5 TABLET, FILM COATED ORAL at 07:50

## 2020-11-06 RX ADMIN — CALCIUM 500 MG: 500 TABLET ORAL at 07:50

## 2020-11-06 RX ADMIN — SERTRALINE HYDROCHLORIDE 100 MG: 100 TABLET ORAL at 07:51

## 2020-11-06 RX ADMIN — LISINOPRIL 30 MG: 20 TABLET ORAL at 07:50

## 2020-11-06 NOTE — PROGRESS NOTES
Care Management Discharge Note    Discharge Date: 11/06/20       Discharge Disposition: Assisted Living    Discharge DME: Walker    Discharge Transportation: Son Gael    Additional Information:  Return to [facility], today. Discharge orders signed by MD and physically faxed to (f) [phone]. Director Alistair was updated by RN CC 11/5 with plans for pt to return this morning.    PRISCILA Andino

## 2020-11-06 NOTE — PROGRESS NOTES
Patient's After Visit Summary was reviewed with patient.  Patient verbalized understanding of After Visit Summary, recommended follow up and was given an opportunity to ask questions.   Discharge medications sent home with patient/family: YES   Discharged with son via private vehicle.       OBSERVATION patient END time: 1:05 PM

## 2020-11-06 NOTE — DISCHARGE SUMMARY
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    Hypotension, resolved  Essential hypertension  Nocturnal hypoxia, resolved  Acute renal failure, resolved   Thrombocytopenia, mild, likely reactive   Hypokalemia, resolved   History CAD with STEMI in 5/2017  Depression  Chronic pain  GERD              Discharge Disposition:   Discharged to assisted living           Allergies:   Allergies   Allergen Reactions     Atorvastatin Muscle Pain (Myalgia)     Macrobid [Nitrofurantoin Anhydrous] Other (See Comments)     Body aches     Nitrofurantoin Muscle Pain (Myalgia) and Unknown     Body aches       Penicillins Unknown     Seasonal Allergies Unknown     Sulfa Drugs Other (See Comments)     Tolerated Bactrim May 2019  Entire family has allergy to Sulfa     Sulfasalazine Unknown                Condition on Discharge:   Discharge condition: Stable   Discharge vitals: Blood pressure (!) 158/53, pulse 68, temperature 96.3  F (35.7  C), temperature source Oral, resp. rate 18, height 1.524 m (5'), weight 57.6 kg (127 lb), SpO2 92 %, not currently breastfeeding.   Code status on discharge: DNR / DNI   Physical exam on day of discharge:   GENERAL:  Comfortable. Cooperative.  PSYCH: pleasant, oriented, No acute distress.  EYES: PERRLA, Normal conjunctiva.  HEART:  Regular rate and rhythm. No JVD. Pulses normal. No edema.  LUNGS:  Clear to auscultation, normal Respiratory effort.  ABDOMEN:  Soft, no hepatosplenomegaly, normal bowel sounds.  EXTREMETIES: No  clubbing, cyanosis or ischemia  SKIN:  Dry to touch, No rash.         History of Present Illness and Hospital Course:     See detailed admission note for full details.    See detailed admission note for full details.  Daya Ignacio is an 88 year old female with history of hypertension, chronic anemia, lumbar compression fractures with chronic back pain, multiple hospitalizations for UTI (VRE, ESBL), C. Difficile, diverticular GI bleed, CAD (STEMI 5/2017), CHF (with preserved EF), depression, neuropathy, and hospitalization in May 2020 (with obstructing stone, UTI, and left ureter stent placement/lithotripsy). She was admitted on 10/30 for delayed stent removal (was lost to follow-up after cystoscopy in May). The hospitalist service was consulted for hypotension on 10/31 am. It was thought that Daya was septic due to UTI. She was initially treated with  Keflex and Cipro. Urine culture ultimately grew < 10 000 colonies of proteus and 2 strains of enterococcus. Blood culture ultimately grew fungus that was later identified as candida glabrata. She was initially treated with capsofungin and then changed to micafungin. Antibiotics were stopped due to low numbers of 3 different organisms (thought to be colonized; prophylactic antibiotic discouraged going forward). Micafungin was changed to Fluconazole once susceptibilities came back with ALFRED of 8 (two weeks of treatment planned after discharge with repeat blood cultures one week after completion of treatment).     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  - was on broad spectrum antibiotics whuich were stopped on 11/2  - discontinue prior to admission prophylactic Keflex      Essential  "hypertension  Hypotension early in stay, resolved  - continue pta carvedilol, amlodipine, imdur, clonidine, and lisinopril     Nocturnal hypoxia  - resolved     Question of aspiration evening of 10/31  - per nursing patient \"choked\" while eating  - no fever or dyspnea  - chest xray was negative      Acute renal failure, resolved  - was likely due to sepsis     Post op after left ureteral stent exchange/lithotripsy on 10/30/20  - Urology to arrange outpatient follow up after discharge     Thrombocytopenia  - likely reactive to sepsis     Hypokalemia, resolved     History CAD with STEMI in 5/2017  - continue pta Coreg, lisinopril, and imdur  - not on asa as outpt     Depression  - was on Sertraline but it appears this was stopped at some point prior to presentation (was taken off list with medication reconciliation     Chronic pain  - seems well controlled     GERD  - cont pta prilosec                  Procedures / Imaging:   Left ureteral stent exchange/lithotripsy on 10/30/20           Consultations:   Consultation during this admission received from infectious disease and urology             Pending Results:   None           Discharge Instructions and Follow-Up:   Discharge diet: Regular   Discharge activity: Activity as tolerated   Discharge follow-up: Follow up with primary care provider in 1 week and then in 3 weeks with two repeat blood cultures  [facility] Urology will arrange outpatient follow up   Outpatient therapy: None    Other instructions: None             Discharge Medications:   Current Discharge Medication List      START taking these medications    Details   fluconazole (DIFLUCAN) 200 MG tablet Take 2 tablets (400 mg) by mouth daily for 14 days  Qty: 28 tablet, Refills: 0    Associated Diagnoses: Fungemia         CONTINUE these medications which have NOT CHANGED    Details   acetaminophen (TYLENOL) 500 MG tablet Take 500 mg by mouth 3 times daily as needed for mild pain      amLODIPine (NORVASC) 10 MG " tablet Take 10 mg by mouth daily      Calcium Carbonate (CALCIUM 600 PO) Take 1 tablet by mouth 2 times daily       !! carvedilol (COREG) 25 MG tablet Take 25 mg by mouth At Bedtime      !! carvedilol (COREG) 25 MG tablet Take 12.5 mg by mouth every morning       Cholecalciferol (VITAMIN D3) 2000 UNITS CAPS Take 2,000 Units by mouth daily (with dinner)      cloNIDine (CATAPRES) 0.1 MG tablet Take 0.1 mg by mouth 2 times daily      diclofenac (VOLTAREN) 1 % topical gel Place 2 g onto the skin 3 times daily as needed for moderate pain Apply to right knee      docusate sodium (COLACE) 100 MG capsule Take 1 capsule (100 mg) by mouth At Bedtime    Associated Diagnoses: Closed fracture of proximal end of right tibia and fibula with routine healing, subsequent encounter; S/P ORIF (open reduction internal fixation) fracture      gabapentin (NEURONTIN) 100 MG capsule Take 2 capsules (200 mg) by mouth 2 times daily  Qty: 120 capsule, Refills: 0    Associated Diagnoses: Other chronic pain      isosorbide dinitrate (ISORDIL) 10 MG tablet Take 1 tablet (10 mg) by mouth 3 times daily  Qty: 270 tablet, Refills: 3    Associated Diagnoses: Non-STEMI (non-ST elevated myocardial infarction) (H)      lisinopril (ZESTRIL) 30 MG tablet Take 30 mg by mouth daily      Multiple Vitamins-Minerals (PRESERVISION AREDS) CAPS Take 1 capsule by mouth 2 times daily       omeprazole 20 MG tablet Take 20 mg by mouth daily       SENNA-docusate sodium (SENNA S) 8.6-50 MG tablet Take 1 tablet by mouth At Bedtime    Associated Diagnoses: Constipation, unspecified constipation type      sertraline (ZOLOFT) 100 MG tablet Take 100 mg by mouth daily      vitamin C (ASCORBIC ACID) 500 MG tablet Take 500 mg by mouth daily at 4pm       polyethylene glycol (MIRALAX/GLYCOLAX) packet Take 17 g by mouth daily as needed for constipation    Associated Diagnoses: Tibia/fibula fracture, right, closed, initial encounter       !! - Potential duplicate medications found.  Please discuss with provider.      STOP taking these medications       cephALEXin (KEFLEX) 250 MG capsule Comments:   Reason for Stopping:         ondansetron (ZOFRAN-ODT) 4 MG ODT tab Comments:   Reason for Stopping:         oxyCODONE (ROXICODONE) 5 MG tablet Comments:   Reason for Stopping:         oxyCODONE (ROXICODONE) 5 MG tablet Comments:   Reason for Stopping:                  Total time spent in face to face contact with the patient and coordinating discharge was:  35 Minutes

## 2020-11-06 NOTE — PROGRESS NOTES
CM updated that Alistair, Director for [facility], needs a call back. CM called him back at [phone]. He just received the orders the  faxed to him. No additional needs at this time.     CM will continue to follow patient until discharge for any additional needs.     Citlaly Sidhu RN, BSN, CPHN, CM  Inpatient Care Coordination  [facility]  [phone]

## 2020-11-06 NOTE — PLAN OF CARE
/53 (BP Location: Left arm)   Pulse 62   Temp 97.4  F (36.3  C) (Axillary)   Resp 16   Ht 1.524 m (5')   Wt 57.6 kg (127 lb)   SpO2 91%   BMI 24.80 kg/m      Neuro: WDL  Cardiac: WDL  Lungs: WDL  GI: ex., loose stools  : WDL  Pain: denies  IV: saline locked   Meds: Diflucan  Labs/tests: U/C-Candida   Diet: Regular  Activity: SBA  Plan: Patient will discharge tomorrow son will transport patient. Will continue to monitor and provide cares.

## 2020-11-06 NOTE — PLAN OF CARE
[id number]    Inpatient Progress Note:    /49 (BP Location: Left arm)   Pulse 61   Temp 97.1  F (36.2  C) (Oral)   Resp 18   Ht 1.524 m (5')   Wt 57.6 kg (127 lb)   SpO2 94%   BMI 24.80 kg/m         Orientation: Alert and Oriented x 4  Neuro: Intact  Pain status: Denies, scheduled oxcodone given  Activity: SBA with WW and GB  Peripheral edema: None  Resp: CTAB, pt was off oxygen throughout the night, on a continuous pulse ox 93-95% throughout the night with no desaturations   Cardiac: WDL  GI: Loose stools, stool softeners held  :  WDL  Skin: Skin tear   LDA: No PIV access  Infusions: None  Pertinent Labs: Plts this AM   Diet: Regular., ate 50% of dinner  Consults: I/D following, recommending 2 weeks of diflucan  Discharge Plan: Home today with son transporting.     Will continue to monitor and provide cares.     Nirav Vega RN

## 2020-11-17 LAB
BACTERIA SPEC CULT: ABNORMAL
Lab: ABNORMAL
SPECIMEN SOURCE: ABNORMAL
SPECIMEN SOURCE: ABNORMAL

## 2020-11-18 ENCOUNTER — TELEPHONE (OUTPATIENT)
Dept: UROLOGY | Facility: CLINIC | Age: 85
End: 2020-11-18

## 2020-11-18 ENCOUNTER — OFFICE VISIT (OUTPATIENT)
Dept: UROLOGY | Facility: CLINIC | Age: 85
End: 2020-11-18
Payer: COMMERCIAL

## 2020-11-18 VITALS
BODY MASS INDEX: 24.94 KG/M2 | SYSTOLIC BLOOD PRESSURE: 154 MMHG | WEIGHT: 127 LBS | HEIGHT: 60 IN | HEART RATE: 64 BPM | DIASTOLIC BLOOD PRESSURE: 68 MMHG

## 2020-11-18 DIAGNOSIS — N39.0 URINARY TRACT INFECTION WITHOUT HEMATURIA, SITE UNSPECIFIED: Primary | ICD-10-CM

## 2020-11-18 DIAGNOSIS — N20.0 NEPHROLITHIASIS: ICD-10-CM

## 2020-11-18 PROCEDURE — 99213 OFFICE O/P EST LOW 20 MIN: CPT | Mod: 25 | Performed by: UROLOGY

## 2020-11-18 PROCEDURE — 52310 CYSTOSCOPY AND TREATMENT: CPT | Performed by: UROLOGY

## 2020-11-18 RX ORDER — CIPROFLOXACIN 500 MG/1
500 TABLET, FILM COATED ORAL 2 TIMES DAILY
Qty: 6 TABLET | Refills: 0 | Status: SHIPPED | OUTPATIENT
Start: 2020-11-18 | End: 2020-11-21

## 2020-11-18 RX ORDER — CIPROFLOXACIN 500 MG/1
500 TABLET, FILM COATED ORAL ONCE
Qty: 1 TABLET | Refills: 0 | Status: SHIPPED | OUTPATIENT
Start: 2020-11-18 | End: 2020-11-18

## 2020-11-18 ASSESSMENT — PAIN SCALES - GENERAL: PAINLEVEL: NO PAIN (0)

## 2020-11-18 ASSESSMENT — MIFFLIN-ST. JEOR: SCORE: 917.57

## 2020-11-18 NOTE — LETTER
11/18/2020       RE: Daya Ignacio  [address] [facility] [address]     Dear Colleague,    Thank you for referring your patient, Daya Ignacio, to the [facility] [place] at [facility]. Please see a copy of my visit note below.    Office Visit Note  [facility]  [phone]    Nov 18, 2020    [unfilled]    10/11/1930    UROLOGIC DIAGNOSES:    Left ureteral stones, urinary tract infection, fungal urinary tract infection    CURRENT INTERVENTIONS:    S/P ureteroscopy    History:    Daya underwent left-sided stent placement in the operating room on May 6 and then left extracorporeal shockwave lithotripsy on May 28.  She was then scheduled for follow-up review of KUB and possible stent removal on June 11.  That June 11 appointment was canceled.  Please see the telephone note in epic on Abi 10 for details.  Unfortunately, this led her to have a retained ureteral stent.  She was taken to the operating room on October 30 for removal of her ureteral stones.  The retained ureteral stent was quite encrusted and I needed to replace a stent at the end of the case.  She then unfortunately developed fevers and hypotension after surgery.  She was eventually diagnosed with candidemia on blood cultures.  She is being treated with fluconazole by Dr Camejo with infectious disease.  She has felt well since he was discharged from her hospitalization on fluconazole.  She is here today for stent removal.      Imaging:      Labs:      MEDICATIONS:    Current Outpatient Medications:      acetaminophen (TYLENOL) 500 MG tablet, Take 500 mg by mouth 3 times daily as needed for mild pain, Disp: , Rfl:      amLODIPine (NORVASC) 10 MG tablet, Take 10 mg by mouth daily, Disp: , Rfl:      Calcium Carbonate (CALCIUM 600 PO), Take 1 tablet by mouth 2 times daily , Disp: , Rfl:      carvedilol (COREG) 25 MG tablet, Take 25 mg by mouth At Bedtime, Disp: , Rfl:       carvedilol (COREG) 25 MG tablet, Take 12.5 mg by mouth every morning , Disp: , Rfl:      Cholecalciferol (VITAMIN D3) 2000 UNITS CAPS, Take 2,000 Units by mouth daily (with dinner), Disp: , Rfl:      cloNIDine (CATAPRES) 0.1 MG tablet, Take 0.1 mg by mouth 2 times daily, Disp: , Rfl:      diclofenac (VOLTAREN) 1 % topical gel, Place 2 g onto the skin 3 times daily as needed for moderate pain Apply to right knee, Disp: , Rfl:      docusate sodium (COLACE) 100 MG capsule, Take 1 capsule (100 mg) by mouth At Bedtime, Disp: , Rfl:      fluconazole (DIFLUCAN) 200 MG tablet, Take 2 tablets (400 mg) by mouth daily for 14 days, Disp: 28 tablet, Rfl: 0     gabapentin (NEURONTIN) 100 MG capsule, Take 2 capsules (200 mg) by mouth 2 times daily, Disp: 120 capsule, Rfl: 0     isosorbide dinitrate (ISORDIL) 10 MG tablet, Take 1 tablet (10 mg) by mouth 3 times daily, Disp: 270 tablet, Rfl: 3     lisinopril (ZESTRIL) 30 MG tablet, Take 30 mg by mouth daily, Disp: , Rfl:      Multiple Vitamins-Minerals (PRESERVISION AREDS) CAPS, Take 1 capsule by mouth 2 times daily , Disp: , Rfl:      omeprazole 20 MG tablet, Take 20 mg by mouth daily , Disp: , Rfl:      polyethylene glycol (MIRALAX/GLYCOLAX) packet, Take 17 g by mouth daily as needed for constipation, Disp: , Rfl:      SENNA-docusate sodium (SENNA S) 8.6-50 MG tablet, Take 1 tablet by mouth At Bedtime, Disp: , Rfl:      sertraline (ZOLOFT) 100 MG tablet, Take 100 mg by mouth daily, Disp: , Rfl:      vitamin C (ASCORBIC ACID) 500 MG tablet, Take 500 mg by mouth daily at 4pm , Disp: , Rfl:     ALLERGIES:     Allergies   Allergen Reactions     Atorvastatin Muscle Pain (Myalgia)     Macrobid [Nitrofurantoin Anhydrous] Other (See Comments)     Body aches     Nitrofurantoin Muscle Pain (Myalgia) and Unknown     Body aches       Penicillins Unknown     Seasonal Allergies Unknown     Sulfa Drugs Other (See Comments)     Tolerated Bactrim May 2019  Entire family has allergy to Sulfa      Sulfasalazine Unknown       REVIEW OF SYSTEMS: Ten point review of systems without change as outlined in HPI    SURGICAL HISTORY:    Past Surgical History:   Procedure Laterality Date     Cataract removal NOS Bilateral      COMBINED CYSTOSCOPY, INSERT STENT URETER(S) Left 5/6/2020    Procedure: Cystoscopy with left ureteral stent insertion;  Surgeon: Gaurav Munson MD;  Location:  OR     CYSTOSCOPY N/A 8/1/2019    Procedure: Exam under anesthesia, video cystopanendoscopy;  Surgeon: Dennis Carlson MD;  Location:  OR     ESOPHAGOSCOPY, GASTROSCOPY, DUODENOSCOPY (EGD), COMBINED N/A 6/11/2019    Procedure: ESOPHAGOGASTRODUODENOSCOPY (EGD);  Surgeon: Gaurav Degroot MD;  Location:  GI     EXTRACORPOREAL SHOCK WAVE LITHOTRIPSY (ESWL) Left 5/28/2020    Procedure: LEFT EXTRACORPOREAL SHOCK WAVE LITHOTRIPSY;  Surgeon: Gaurav Munson MD;  Location: [facility] OR     Hip Pinning procedure Left      Hip replacement NOS Right      LASER HOLMIUM LITHOTRIPSY URETER(S), INSERT STENT, COMBINED Left 10/30/2020    Procedure: Cystoscopy, left ureteral stent exchange, left retrograde pyelogram, interpretation of fluoroscopic images, left ureteroscopy with holmium lithotripsy and stone basketing;  Surgeon: Gaurav Munson MD;  Location:  OR     OPEN REDUCTION INTERNAL FIXATION TIBIAL PLATEAU Right 8/28/2019    Procedure: Open reduction internal fixation right proximal tibia fracture;  Surgeon: Molina Pardo MD;  Location:  OR     TONSILLECTOMY, ADENOIDECTOMY, COMBINED           PHYSICAL EXAM:    BP (!) 154/68 (BP Location: Right arm)   Pulse 64   Ht 1.524 m (5')   Wt 57.6 kg (127 lb)   BMI 24.80 kg/m      Constitutional: Well developed. Conversant and in no acute distress  Eyes: Anicteric sclera, conjunctiva clear, normal extraocular movements  ENT: Normocephalic and atraumatic,   Skin: Warm and dry. No rashes or lesions  Cardiac: No peripheral edema  Back/Flank: Not done  CNS/PNS:  "Normal musculature and movements, moves all extremities normally  Respiratory: Normal non-labored breathing  Abdomen: Soft nontender and nondistended  Peripheral Vascular: No peripheral edema  Mental Status/Psych: Alert and Oriented x 3. Normal mood and affect      External Genitalia: Not done  Bladder: Not done  Urethra: Not done   Vagina: Not done    Cystoscopy: I performed flexible cystoscopy and remove the stent without difficulty      Urinalysis:  UA RESULTS:  Recent Labs   Lab Test 10/31/20  1130 09/06/17  1525 09/06/17  1525   COLOR Orange   < > Yellow   APPEARANCE Cloudy   < > Cloudy   URINEGLC Negative   < > Negative   URINEBILI Negative   < > Negative   URINEKETONE Negative   < > Negative   SG 1.025   < > 1.015   UBLD Large*   < > Moderate*   URINEPH 5.5   < > 6.0   PROTEIN 200*   < > 100*   UROBILINOGEN  --   --  0.2   NITRITE Negative   < > Negative   LEUKEST Large*   < > Large*   RBCU >182*   < > 10-25*   WBCU >182*   < > >100*    < > = values in this interval not displayed.         IMPRESSION:    Stent removed    PLAN:    Her stent was removed in clinic today.  She is currently taking the fluconazole and plans to continue the fluconazole.  I discussed the plan and my recommendations with the patient's son in clinic today.  We had a lengthy discussion about antibiotic prophylaxis after today's stent removal.  I counseled him that our usual standard is to take a single tablet of 500 mg ciprofloxacin after cystoscopy for prophylactic concerns.  He voiced concerns about this.  He was concerned about her being on an antifungal and antibiotic at the same time.  We discussed the pros and cons of taking versus omitting the prophylactic ciprofloxacin.  I did recommend that she take the single 500 mg ciprofloxacin tablet today.  I informed him that the medication was prescribed to her pharmacy today. (\"[facility]\") He will take this recommendation into consideration. He also plans on following up with " Bashir.    Total Time:  15 min  Time in Consultation: 10 min      Gaurav Munson M.D.

## 2020-11-18 NOTE — TELEPHONE ENCOUNTER
----- Message from Gaurav Munson MD sent at 11/18/2020 10:51 AM CST -----  That story checks out.  Can you PLEASE put a note in Epic stating that you spoke and recommended he see Dr Camejo within the next 2 days?  This has happened before where he has not listened to medical advice, then his mom has a problem, then he gets mad and blames us. As long as you document in [facility] we are good.  Thanks  ----- Message -----  From: Jazmine Mckeon  Sent: 11/18/2020  10:44 AM CST  To: Gaurav Munson MD    I spoke to Gael he said they are doing follow-up blood cultures and they do not need a follow-up with shannon in 2 days, I could not argue with him at all he is very MATTER OF FACT.  Please let me know if I should do anything else  ----- Message -----  From: Gaurav Munson MD  Sent: 11/18/2020   9:32 AM CST  To: [facility] - Scheduling Pool    Please make sure this patient has an appointment with Dr. Camejo with infectious disease within the next 2 days.  She will run out of her antifungal medication in the next 2 days.  Dr. Camejo needs to direct whether she should continue the medication or discontinue the medication at that time.  Please make patient's son Gael aware of this as well thank you.

## 2020-11-18 NOTE — PATIENT INSTRUCTIONS
"     AFTER YOUR CYSTOSCOPY         You have just completed a cystoscopy, or \"cysto\", which allowed your physician to learn more about your bladder (or to remove a stent placed after surgery). We suggest that you continue to avoid caffeine, fruit juice, and alcohol for the next 24 hours, however, you are encouraged to return to your normal activities.       A few things that are considered normal after your cystoscopy:    * small amount of bleeding (or spotting) that clears within the next 24 hours    * slight burning sensation with urination    * sensation to of needing to avoid more frequently    * the feeling of \"air\" in your urine    * mild discomfort that is relieved with Tylonol        Please contact our office promptly if you:    * develop a fever above 101 degrees    * are unable to urinate    * develop bright red blood that does not stop    * severe pain or swelling        And of course, please contact our office with any concerns or questions [phone]  "

## 2020-11-18 NOTE — NURSING NOTE
Chief Complaint   Patient presents with     Ureterolithiasis     Patient is here for Cystoscopy      Prior to the start of the procedure and with procedural staff participation, I verbally confirmed the patient s identity using two indicators, relevant allergies, that the procedure was appropriate and matched the consent or emergent situation, and that the correct equipment/implants were available. Immediately prior to starting the procedure I conducted the Time Out with the procedural staff and re-confirmed the patient s name, procedure, and site/side. (The Joint Commission universal protocol was followed.)  Yes    Sedation (Moderate or Deep): None.  Prior to procedure patient's urethra was cleansed with iodine and draped in sterile fashion. Lidocaine Gel was inserted into urethra. Patient tolerated well.   2% Lidocaine gel Lot#           Exp:    Lilibeth Evans LPN

## 2020-11-18 NOTE — TELEPHONE ENCOUNTER
I spoke with Gael he said they are doing follow-up blood cultures and they do not need to see dr ortega.

## 2020-11-18 NOTE — PROGRESS NOTES
Office Visit Note  [facility]  [phone]    Nov 18, 2020    [unfilled]    10/11/1930    UROLOGIC DIAGNOSES:    Left ureteral stones, urinary tract infection, fungal urinary tract infection    CURRENT INTERVENTIONS:    S/P ureteroscopy    History:    Daya underwent left-sided stent placement in the operating room on May 6 and then left extracorporeal shockwave lithotripsy on May 28.  She was then scheduled for follow-up review of KUB and possible stent removal on June 11.  That June 11 appointment was canceled.  Please see the telephone note in epic on Abi 10 for details.  Unfortunately, this led her to have a retained ureteral stent.  She was taken to the operating room on October 30 for removal of her ureteral stones.  The retained ureteral stent was quite encrusted and I needed to replace a stent at the end of the case.  She then unfortunately developed fevers and hypotension after surgery.  She was eventually diagnosed with candidemia on blood cultures.  She is being treated with fluconazole by Dr Camejo with infectious disease.  She has felt well since he was discharged from her hospitalization on fluconazole.  She is here today for stent removal.      Imaging:      Labs:      MEDICATIONS:    Current Outpatient Medications:      acetaminophen (TYLENOL) 500 MG tablet, Take 500 mg by mouth 3 times daily as needed for mild pain, Disp: , Rfl:      amLODIPine (NORVASC) 10 MG tablet, Take 10 mg by mouth daily, Disp: , Rfl:      Calcium Carbonate (CALCIUM 600 PO), Take 1 tablet by mouth 2 times daily , Disp: , Rfl:      carvedilol (COREG) 25 MG tablet, Take 25 mg by mouth At Bedtime, Disp: , Rfl:      carvedilol (COREG) 25 MG tablet, Take 12.5 mg by mouth every morning , Disp: , Rfl:      Cholecalciferol (VITAMIN D3) 2000 UNITS CAPS, Take 2,000 Units by mouth daily (with dinner), Disp: , Rfl:      cloNIDine (CATAPRES) 0.1 MG tablet, Take 0.1 mg by mouth 2 times daily, Disp: , Rfl:      diclofenac  (VOLTAREN) 1 % topical gel, Place 2 g onto the skin 3 times daily as needed for moderate pain Apply to right knee, Disp: , Rfl:      docusate sodium (COLACE) 100 MG capsule, Take 1 capsule (100 mg) by mouth At Bedtime, Disp: , Rfl:      fluconazole (DIFLUCAN) 200 MG tablet, Take 2 tablets (400 mg) by mouth daily for 14 days, Disp: 28 tablet, Rfl: 0     gabapentin (NEURONTIN) 100 MG capsule, Take 2 capsules (200 mg) by mouth 2 times daily, Disp: 120 capsule, Rfl: 0     isosorbide dinitrate (ISORDIL) 10 MG tablet, Take 1 tablet (10 mg) by mouth 3 times daily, Disp: 270 tablet, Rfl: 3     lisinopril (ZESTRIL) 30 MG tablet, Take 30 mg by mouth daily, Disp: , Rfl:      Multiple Vitamins-Minerals (PRESERVISION AREDS) CAPS, Take 1 capsule by mouth 2 times daily , Disp: , Rfl:      omeprazole 20 MG tablet, Take 20 mg by mouth daily , Disp: , Rfl:      polyethylene glycol (MIRALAX/GLYCOLAX) packet, Take 17 g by mouth daily as needed for constipation, Disp: , Rfl:      SENNA-docusate sodium (SENNA S) 8.6-50 MG tablet, Take 1 tablet by mouth At Bedtime, Disp: , Rfl:      sertraline (ZOLOFT) 100 MG tablet, Take 100 mg by mouth daily, Disp: , Rfl:      vitamin C (ASCORBIC ACID) 500 MG tablet, Take 500 mg by mouth daily at 4pm , Disp: , Rfl:     ALLERGIES:     Allergies   Allergen Reactions     Atorvastatin Muscle Pain (Myalgia)     Macrobid [Nitrofurantoin Anhydrous] Other (See Comments)     Body aches     Nitrofurantoin Muscle Pain (Myalgia) and Unknown     Body aches       Penicillins Unknown     Seasonal Allergies Unknown     Sulfa Drugs Other (See Comments)     Tolerated Bactrim May 2019  Entire family has allergy to Sulfa     Sulfasalazine Unknown       REVIEW OF SYSTEMS: Ten point review of systems without change as outlined in HPI    SURGICAL HISTORY:    Past Surgical History:   Procedure Laterality Date     Cataract removal NOS Bilateral      COMBINED CYSTOSCOPY, INSERT STENT URETER(S) Left 5/6/2020    Procedure:  Cystoscopy with left ureteral stent insertion;  Surgeon: Gaurav Munson MD;  Location: [facility] OR     CYSTOSCOPY N/A 8/1/2019    Procedure: Exam under anesthesia, video cystopanendoscopy;  Surgeon: Dennis Carlson MD;  Location: [facility] OR     ESOPHAGOSCOPY, GASTROSCOPY, DUODENOSCOPY (EGD), COMBINED N/A 6/11/2019    Procedure: ESOPHAGOGASTRODUODENOSCOPY (EGD);  Surgeon: Gaurav Degroot MD;  Location: [facility] GI     EXTRACORPOREAL SHOCK WAVE LITHOTRIPSY (ESWL) Left 5/28/2020    Procedure: LEFT EXTRACORPOREAL SHOCK WAVE LITHOTRIPSY;  Surgeon: Gaurav Munson MD;  Location: [facility] OR     Hip Pinning procedure Left      Hip replacement NOS Right      LASER HOLMIUM LITHOTRIPSY URETER(S), INSERT STENT, COMBINED Left 10/30/2020    Procedure: Cystoscopy, left ureteral stent exchange, left retrograde pyelogram, interpretation of fluoroscopic images, left ureteroscopy with holmium lithotripsy and stone basketing;  Surgeon: Garuav Munson MD;  Location: [facility] OR     OPEN REDUCTION INTERNAL FIXATION TIBIAL PLATEAU Right 8/28/2019    Procedure: Open reduction internal fixation right proximal tibia fracture;  Surgeon: Molina Pardo MD;  Location:  OR     TONSILLECTOMY, ADENOIDECTOMY, COMBINED           PHYSICAL EXAM:    BP (!) 154/68 (BP Location: Right arm)   Pulse 64   Ht 1.524 m (5')   Wt 57.6 kg (127 lb)   BMI 24.80 kg/m      Constitutional: Well developed. Conversant and in no acute distress  Eyes: Anicteric sclera, conjunctiva clear, normal extraocular movements  ENT: Normocephalic and atraumatic,   Skin: Warm and dry. No rashes or lesions  Cardiac: No peripheral edema  Back/Flank: Not done  CNS/PNS: Normal musculature and movements, moves all extremities normally  Respiratory: Normal non-labored breathing  Abdomen: Soft nontender and nondistended  Peripheral Vascular: No peripheral edema  Mental Status/Psych: Alert and Oriented x 3. Normal mood and affect      External Genitalia: Not  "done  Bladder: Not done  Urethra: Not done   Vagina: Not done    Cystoscopy: I performed flexible cystoscopy and remove the stent without difficulty      Urinalysis:  UA RESULTS:  Recent Labs   Lab Test 10/31/20  1130 09/06/17  1525 09/06/17  1525   COLOR Orange   < > Yellow   APPEARANCE Cloudy   < > Cloudy   URINEGLC Negative   < > Negative   URINEBILI Negative   < > Negative   URINEKETONE Negative   < > Negative   SG 1.025   < > 1.015   UBLD Large*   < > Moderate*   URINEPH 5.5   < > 6.0   PROTEIN 200*   < > 100*   UROBILINOGEN  --   --  0.2   NITRITE Negative   < > Negative   LEUKEST Large*   < > Large*   RBCU >182*   < > 10-25*   WBCU >182*   < > >100*    < > = values in this interval not displayed.         IMPRESSION:    Stent removed    PLAN:    Her stent was removed in clinic today.  She is currently taking the fluconazole and plans to continue the fluconazole.  I discussed the plan and my recommendations with the patient's son in clinic today.  We had a lengthy discussion about antibiotic prophylaxis after today's stent removal.  I counseled him that our usual standard is to take a single tablet of 500 mg ciprofloxacin after cystoscopy for prophylactic concerns.  He voiced concerns about this.  He was concerned about her being on an antifungal and antibiotic at the same time.  We discussed the pros and cons of taking versus omitting the prophylactic ciprofloxacin.  I did recommend that she take the single 500 mg ciprofloxacin tablet today.  I informed him that the medication was prescribed to her pharmacy today. (\"[facility]\") He will take this recommendation into consideration. He also plans on following up with Dr Camejo.    Total Time:  15 min  Time in Consultation: 10 min      Gaurav Munson M.D.        "

## 2020-11-21 NOTE — PROGRESS NOTES
[facility]  [place] [facility] Record Number:  [id number]  Place of Service where encounter took place:  [facility] ([facility] [[id number]]  Chief Complaint   Patient presents with     RECHECK       HPI:    Daya Ignacio  is a 89 year old (10/11/1930), who is being seen today for an episodic care visit.  HPI information obtained from: facility chart records, facility staff, patient report, [facility] chart review and family/first contact daughter report.     Per recent TCU provider progress notes:  88 year old female with PMH of HTN, lumbar compression fractures, upper GI bleed, generalized anxiety disorder, recurrent ESBL UTI and c.diff infection. Hospitalized in August due to UTI, returned to hospital within one day due to fall and right leg fracture needed ORIF to right proximal tibia fracture on 8/28/19. Now wears knee immobilizer. Developed leukocytosis secondary to trauma. Multiple recurrent hospitalizations this past year for ESBL UTI and c.difficile. Is on c.diff taper through 9/3. Recently treated with Cipro for recurrent UTI. Frequent urinary symptoms suspect colonization. Ongoing labile BPs/HTN: BP goal 130-160s/60-90 due to dizziness, falls, frail. Currently on Norvasc 5 mg BID, carvedilol 12.5 mg po bid. Continue PTA Imdur, clonidine. VS per TCU policy.      Today's concern is:  Patient seen for episodic TCU follow up. Daughter present for visit. Note SBP range 135-190s, frequently elevated first thing in AM. HR 60-70s. Sats are 98% on room air. Patient no longer has any urinary symptoms and no fevers. We discussed bladder colonization and what true symptoms of UTI would be. Last WBC normal per provider report. Occasional right leg pain managed with tylenol and Voltaren as well as occasional PRN oxycodone used sparingly per patient and family. Therapy notes state she is up 150 ft with 4WW and CGA. Due to f/u with ortho 11/19.     Past Medical and Surgical History  PA SUBMITTED VIA CM FOR Latuda 20MG tablets  Key: [id number] - PA Case ID: [id number] - Rx #: [id number]  STATUS: PENDING reviewed in Epic today.    MEDICATIONS:  Current Outpatient Medications   Medication Sig Dispense Refill     acetaminophen (TYLENOL) 500 MG tablet Take 2 tablets (1,000 mg) by mouth 3 times daily       amLODIPine (NORVASC) 10 MG tablet Take 0.5 tablets (5 mg) by mouth 2 times daily 90 tablet 1     aspirin (ASA) 325 MG EC tablet Take 1 tablet (325 mg) by mouth daily 30 tablet 0     Calcium Carbonate (CALCIUM 600 PO) Take 1 tablet by mouth 2 times daily        carvedilol (COREG) 25 MG tablet Take 0.5 tablets (12.5 mg) by mouth 2 times daily (with meals) (Patient taking differently: Take by mouth 2 times daily (with meals) Take 12.5 mg in AM and 25 mg PO at HS)       Cholecalciferol (VITAMIN D3) 2000 UNITS CAPS Take 2,000 Units by mouth daily (with dinner)       cloNIDine (CATAPRES) 0.1 MG tablet TAKE ONE TABLET BY MOUTH TWO TIMES A  tablet 0     diclofenac (VOLTAREN) 1 % topical gel Apply 2 g topically 3 times daily Apply to right knee       docusate sodium (COLACE) 100 MG capsule Take 1 capsule (100 mg) by mouth At Bedtime       estradiol (ESTRACE) 0.1 MG/GM vaginal cream Place 2 g vaginally three times a week paraben-free 6 g 3     gabapentin (NEURONTIN) 100 MG capsule Take 2 capsules (200 mg) by mouth 2 times daily 120 capsule 0     isosorbide dinitrate (ISORDIL) 10 MG tablet Take 1 tablet (10 mg) by mouth 3 times daily 270 tablet 3     Lidocaine (LIDOCARE) 4 % Patch Apply to low back topically one time a day for pain and remove per schedule as needed.       Multiple Vitamins-Minerals (PRESERVISION AREDS) CAPS Take 1 capsule by mouth 2 times daily        multivitamin w/minerals (THERA-VIT-M) tablet Take 1 tablet by mouth daily       omeprazole (PRILOSEC) 10 MG DR capsule Take 20 mg by mouth daily       ondansetron (ZOFRAN-ODT) 4 MG ODT tab Take 1 tablet (4 mg) by mouth every 6 hours as needed for nausea or vomiting       oxyCODONE (ROXICODONE) 5 MG tablet Take 0.5 tablets (2.5 mg) by mouth every 4 hours as  needed for severe pain 15 tablet 0     polyethylene glycol (MIRALAX/GLYCOLAX) packet Take 17 g by mouth daily as needed for constipation       senna-docusate (SENOKOT-S/PERICOLACE) 8.6-50 MG tablet Take 2 tablets by mouth 2 times daily as needed for constipation       SENNA-docusate sodium (SENNA S) 8.6-50 MG tablet Take 1 tablet by mouth At Bedtime       sertraline (ZOLOFT) 100 MG tablet Take 1 tablet (100 mg) by mouth daily 90 tablet 1     vitamin C (ASCORBIC ACID) 500 MG tablet Take 500 mg by mouth daily       REVIEW OF SYSTEMS:  10 point ROS of systems including Constitutional, Eyes, Respiratory, Cardiovascular, Gastroenterology, Genitourinary, Integumentary, Musculoskeletal, Psychiatric were all negative except for pertinent positives noted in my HPI.    Objective:  /60   Pulse 62   Temp 97.6  F (36.4  C)   Resp 18   Ht 1.524 m (5')   Wt 56.6 kg (124 lb 12.8 oz)   SpO2 93%   BMI 24.37 kg/m    Exam:  GENERAL APPEARANCE:  Alert, in no distress, pleasant, cooperative, oriented x self, place, date and family  EYES:  EOM, lids, pupils and irises normal, sclera clear and conjunctiva normal, no discharge or mattering on lids or lashes noted  ENT:  Mouth normal, moist mucous membranes, nose normal without drainage or crusting, external ears without lesions, hearing acuity intact  RESP:  respiratory effort normal, no chest wall tenderness, no respiratory distress, Lung sounds clear but diminished all fields, patient is on room air  CV:  Auscultation of heart done, rate and rhythm controlled and regular, no murmur, no rub or gallop. Edema none bilateral lower extremities. VASCULAR: no open areas on extremities noted  ABDOMEN:  normal bowel sounds, soft, nontender, no palpable masses.  M/S:   Gait and station walks with assist , no tenderness or swelling of the joints; able to move all extremities, digits normal. Immobilizer in place right leg  NEURO: cranial nerves 2-12 grossly intact, no facial asymmetry,  no speech deficits and able to follow directions, moves all extremities symmetrically  PSYCH:  insight and judgement and memory at baseline, affect and mood normal     Labs:   11/2 WBC 6.6, Hgb 9.2, Plt 184    Most Recent 3 CBC's:  Recent Labs   Lab Test 09/17/19 09/09/19 09/07/19 09/04/19 08/30/19  0742 08/29/19  0716   WBC  --   --   --   --  6.7 11.2* 20.3*   HGB 9.4* 8.3* 8.1*   < > 7.2* 8.0* 8.1*   MCV  --   --   --   --  96 100 102*   PLT  --   --   --   --  241 188 163    < > = values in this interval not displayed.     Most Recent 3 BMP's:  Recent Labs   Lab Test 10/28/19 09/26/19 09/23/19    138 137   POTASSIUM 3.6 3.9 3.4*   CHLORIDE 102 103 102   CO2 30 27 28   BUN 18 19 16   CR 0.71 0.69 0.63   ANIONGAP 6 8 7   DAVE 9.8 9.6 9.9   GLC 85 88 88       ASSESSMENT/PLAN:  Closed fracture of proximal end of right tibia and fibula with routine healing, subsequent encounter  S/P ORIF (open reduction internal fixation) fracture  Physical deconditioning  History of compression fracture of spine  Ongoing issues - progressing gradually in therapies, f/u with progress next week. F/U ortho as recommended. Pain meds tylenol, Voltaren and oxycodone as ordered. Immobilizer in place when up    Urinary tract infection due to ESBL Klebsiella  By history- no current symptoms. Monitor for any recurrence.     Hx of Clostridium difficile infection  Resolved - no diarrhea or fevers. Monitor     Essential hypertension  Chronic, not well managed. BPs remain elevated in AM with occasional hypotension. Will increase HS carvedilol dose to 25 mg PO and leave AM dose at 12.5 mg PO. F/U with BPs within the next week or sooner PRN. Continue other bp meds, vs, wt as ordered.     Generalized anxiety disorder  By history, denies symptoms at this time. Monitor.     Anemia, unspecified type  Leukocytosis, unspecified type  Thrombocytopenia (H)  History of GI bleed  Acute on chronic - no GI symptoms. Leukocytosis and thrombocytopenia  resolved. Hgb improved - f/u PRN.     Orders written by provider at facility:  Change carvedilol to 12.5 mg PO in AM and 25 mg PO at HS diagnosis HTN    Total time spent with patient visit at the skilled nursing facility was 35 min including patient visit, review of past records and phone calls from SW with updates. Greater than 50% of total time spent with counseling and coordinating care due to discussion with the patient and family concerning diagnostic results/impressions from recent UA/UC and CBC tests and recommended diagnostic studies if symptoms were to recur as well as qualifying symptoms, review of VS and progress in therapies and current pain management strategies, plan for follow up with ortho and proposed BP medication changes. Communicated with SW re: follow up plan and nursing regarding changes as above.     Electronically signed by:  KARTHIK Dueñas CNP

## 2020-11-24 ENCOUNTER — RECORDS - HEALTHEAST (OUTPATIENT)
Dept: LAB | Facility: CLINIC | Age: 85
End: 2020-11-24

## 2020-11-25 ENCOUNTER — TRANSFERRED RECORDS (OUTPATIENT)
Dept: HEALTH INFORMATION MANAGEMENT | Facility: CLINIC | Age: 85
End: 2020-11-25

## 2020-11-25 LAB
ANION GAP SERPL CALCULATED.3IONS-SCNC: 9 MMOL/L (ref 5–18)
BUN SERPL-MCNC: 12 MG/DL (ref 8–28)
CALCIUM SERPL-MCNC: 9.4 MG/DL (ref 8.5–10.5)
CHLORIDE BLD-SCNC: 103 MMOL/L (ref 98–107)
CO2 SERPL-SCNC: 27 MMOL/L (ref 22–31)
CREAT SERPL-MCNC: 0.58 MG/DL (ref 0.6–1.1)
ERYTHROCYTE [DISTWIDTH] IN BLOOD BY AUTOMATED COUNT: 13.9 % (ref 11–14.5)
GFR SERPL CREATININE-BSD FRML MDRD: >60 ML/MIN/1.73M2
GLUCOSE BLD-MCNC: 100 MG/DL (ref 70–125)
HCT VFR BLD AUTO: 32.1 % (ref 35–47)
HGB BLD-MCNC: 10.7 G/DL (ref 12–16)
MCH RBC QN AUTO: 31.2 PG (ref 27–34)
MCHC RBC AUTO-ENTMCNC: 33.3 G/DL (ref 32–36)
MCV RBC AUTO: 94 FL (ref 80–100)
PLATELET # BLD AUTO: 224 THOU/UL (ref 140–440)
PMV BLD AUTO: 10.8 FL (ref 8.5–12.5)
POTASSIUM BLD-SCNC: 3.3 MMOL/L (ref 3.5–5)
RBC # BLD AUTO: 3.43 MILL/UL (ref 3.8–5.4)
SODIUM SERPL-SCNC: 139 MMOL/L (ref 136–145)
WBC: 6.6 THOU/UL (ref 4–11)

## 2020-11-30 LAB
AEROBIC BLOOD CULTURE BOTTLE: NO GROWTH
ANAEROBIC BLOOD CULTURE BOTTLE: NO GROWTH

## 2020-12-02 LAB
AEROBIC BLOOD CULTURE BOTTLE: NO GROWTH
ANAEROBIC BLOOD CULTURE BOTTLE: NO GROWTH

## 2021-04-11 ENCOUNTER — HEALTH MAINTENANCE LETTER (OUTPATIENT)
Age: 86
End: 2021-04-11

## 2021-05-08 ENCOUNTER — APPOINTMENT (OUTPATIENT)
Dept: CT IMAGING | Facility: CLINIC | Age: 86
DRG: 690 | End: 2021-05-08
Attending: NURSE PRACTITIONER
Payer: COMMERCIAL

## 2021-05-08 ENCOUNTER — HOSPITAL ENCOUNTER (INPATIENT)
Facility: CLINIC | Age: 86
LOS: 14 days | Discharge: NURSING, REHAB OR LONG-TERM CARE | DRG: 690 | End: 2021-05-22
Attending: NURSE PRACTITIONER | Admitting: HOSPITALIST
Payer: COMMERCIAL

## 2021-05-08 DIAGNOSIS — S32.030A CLOSED COMPRESSION FRACTURE OF L3 LUMBAR VERTEBRA, INITIAL ENCOUNTER (H): ICD-10-CM

## 2021-05-08 DIAGNOSIS — M54.9 BACK PAIN: ICD-10-CM

## 2021-05-08 DIAGNOSIS — G47.00 INSOMNIA, UNSPECIFIED TYPE: ICD-10-CM

## 2021-05-08 DIAGNOSIS — R11.0 NAUSEA: ICD-10-CM

## 2021-05-08 DIAGNOSIS — N94.9 ADNEXAL CYST: ICD-10-CM

## 2021-05-08 DIAGNOSIS — K56.7 ILEUS (H): ICD-10-CM

## 2021-05-08 DIAGNOSIS — M80.88XG OSTEOPOROTIC COMPRESSION FRACTURE OF SPINE, WITH DELAYED HEALING, SUBSEQUENT ENCOUNTER: Primary | ICD-10-CM

## 2021-05-08 LAB
ANION GAP SERPL CALCULATED.3IONS-SCNC: 1 MMOL/L (ref 3–14)
BUN SERPL-MCNC: 31 MG/DL (ref 7–30)
CALCIUM SERPL-MCNC: 9 MG/DL (ref 8.5–10.1)
CHLORIDE SERPL-SCNC: 104 MMOL/L (ref 94–109)
CO2 SERPL-SCNC: 29 MMOL/L (ref 20–32)
CREAT SERPL-MCNC: 0.87 MG/DL (ref 0.52–1.04)
ERYTHROCYTE [DISTWIDTH] IN BLOOD BY AUTOMATED COUNT: 13.5 % (ref 10–15)
GFR SERPL CREATININE-BSD FRML MDRD: 58 ML/MIN/{1.73_M2}
GLUCOSE SERPL-MCNC: 105 MG/DL (ref 70–99)
HCT VFR BLD AUTO: 31.1 % (ref 35–47)
HGB BLD-MCNC: 10.3 G/DL (ref 11.7–15.7)
LABORATORY COMMENT REPORT: NORMAL
MCH RBC QN AUTO: 31.7 PG (ref 26.5–33)
MCHC RBC AUTO-ENTMCNC: 33.1 G/DL (ref 31.5–36.5)
MCV RBC AUTO: 96 FL (ref 78–100)
PLATELET # BLD AUTO: 193 10E9/L (ref 150–450)
POTASSIUM SERPL-SCNC: 3.8 MMOL/L (ref 3.4–5.3)
RBC # BLD AUTO: 3.25 10E12/L (ref 3.8–5.2)
SARS-COV-2 RNA RESP QL NAA+PROBE: NEGATIVE
SODIUM SERPL-SCNC: 134 MMOL/L (ref 133–144)
SPECIMEN SOURCE: NORMAL
WBC # BLD AUTO: 8.3 10E9/L (ref 4–11)

## 2021-05-08 PROCEDURE — C9803 HOPD COVID-19 SPEC COLLECT: HCPCS

## 2021-05-08 PROCEDURE — 74176 CT ABD & PELVIS W/O CONTRAST: CPT

## 2021-05-08 PROCEDURE — 250N000013 HC RX MED GY IP 250 OP 250 PS 637: Performed by: NURSE PRACTITIONER

## 2021-05-08 PROCEDURE — 99285 EMERGENCY DEPT VISIT HI MDM: CPT | Mod: 25

## 2021-05-08 PROCEDURE — 120N000001 HC R&B MED SURG/OB

## 2021-05-08 PROCEDURE — 250N000011 HC RX IP 250 OP 636: Performed by: NURSE PRACTITIONER

## 2021-05-08 PROCEDURE — 85027 COMPLETE CBC AUTOMATED: CPT | Performed by: NURSE PRACTITIONER

## 2021-05-08 PROCEDURE — G0378 HOSPITAL OBSERVATION PER HR: HCPCS

## 2021-05-08 PROCEDURE — 87635 SARS-COV-2 COVID-19 AMP PRB: CPT | Performed by: NURSE PRACTITIONER

## 2021-05-08 PROCEDURE — 99223 1ST HOSP IP/OBS HIGH 75: CPT | Mod: AI | Performed by: HOSPITALIST

## 2021-05-08 PROCEDURE — 250N000011 HC RX IP 250 OP 636

## 2021-05-08 PROCEDURE — 258N000003 HC RX IP 258 OP 636: Performed by: NURSE PRACTITIONER

## 2021-05-08 PROCEDURE — 80048 BASIC METABOLIC PNL TOTAL CA: CPT | Performed by: NURSE PRACTITIONER

## 2021-05-08 RX ORDER — ONDANSETRON 2 MG/ML
INJECTION INTRAMUSCULAR; INTRAVENOUS
Status: COMPLETED
Start: 2021-05-08 | End: 2021-05-08

## 2021-05-08 RX ORDER — DIMENHYDRINATE 50 MG
50 TABLET ORAL ONCE
Status: COMPLETED | OUTPATIENT
Start: 2021-05-08 | End: 2021-05-08

## 2021-05-08 RX ORDER — AMLODIPINE BESYLATE 5 MG/1
10 TABLET ORAL DAILY
Status: CANCELLED | OUTPATIENT
Start: 2021-05-09

## 2021-05-08 RX ORDER — HYDROMORPHONE HYDROCHLORIDE 1 MG/ML
0.5 INJECTION, SOLUTION INTRAMUSCULAR; INTRAVENOUS; SUBCUTANEOUS ONCE
Status: COMPLETED | OUTPATIENT
Start: 2021-05-08 | End: 2021-05-08

## 2021-05-08 RX ORDER — LIDOCAINE 4 G/G
1 PATCH TOPICAL ONCE
Status: COMPLETED | OUTPATIENT
Start: 2021-05-08 | End: 2021-05-09

## 2021-05-08 RX ORDER — KETOROLAC TROMETHAMINE 15 MG/ML
15 INJECTION, SOLUTION INTRAMUSCULAR; INTRAVENOUS ONCE
Status: COMPLETED | OUTPATIENT
Start: 2021-05-08 | End: 2021-05-08

## 2021-05-08 RX ADMIN — ONDANSETRON 4 MG: 2 INJECTION INTRAMUSCULAR; INTRAVENOUS at 20:05

## 2021-05-08 RX ADMIN — KETOROLAC TROMETHAMINE 15 MG: 15 INJECTION, SOLUTION INTRAMUSCULAR; INTRAVENOUS at 20:05

## 2021-05-08 RX ADMIN — LIDOCAINE 1 PATCH: 560 PATCH PERCUTANEOUS; TOPICAL; TRANSDERMAL at 21:24

## 2021-05-08 RX ADMIN — HYDROMORPHONE HYDROCHLORIDE 0.5 MG: 1 INJECTION, SOLUTION INTRAMUSCULAR; INTRAVENOUS; SUBCUTANEOUS at 20:05

## 2021-05-08 RX ADMIN — SODIUM CHLORIDE 1000 ML: 9 INJECTION, SOLUTION INTRAVENOUS at 19:39

## 2021-05-08 RX ADMIN — DIMENHYDRINATE 50 MG: 50 TABLET ORAL at 20:04

## 2021-05-08 ASSESSMENT — ENCOUNTER SYMPTOMS
DYSURIA: 0
SHORTNESS OF BREATH: 0
VOMITING: 0
FEVER: 0
BACK PAIN: 1
FLANK PAIN: 1

## 2021-05-09 ENCOUNTER — APPOINTMENT (OUTPATIENT)
Dept: PHYSICAL THERAPY | Facility: CLINIC | Age: 86
DRG: 690 | End: 2021-05-09
Attending: HOSPITALIST
Payer: COMMERCIAL

## 2021-05-09 LAB
ALBUMIN UR-MCNC: 50 MG/DL
ANION GAP SERPL CALCULATED.3IONS-SCNC: 1 MMOL/L (ref 3–14)
APPEARANCE UR: ABNORMAL
BACTERIA #/AREA URNS HPF: ABNORMAL /HPF
BILIRUB UR QL STRIP: NEGATIVE
BUN SERPL-MCNC: 26 MG/DL (ref 7–30)
CALCIUM SERPL-MCNC: 9 MG/DL (ref 8.5–10.1)
CHLORIDE SERPL-SCNC: 106 MMOL/L (ref 94–109)
CO2 SERPL-SCNC: 28 MMOL/L (ref 20–32)
COLOR UR AUTO: YELLOW
CREAT SERPL-MCNC: 0.8 MG/DL (ref 0.52–1.04)
ERYTHROCYTE [DISTWIDTH] IN BLOOD BY AUTOMATED COUNT: 13.5 % (ref 10–15)
GFR SERPL CREATININE-BSD FRML MDRD: 64 ML/MIN/{1.73_M2}
GLUCOSE SERPL-MCNC: 93 MG/DL (ref 70–99)
GLUCOSE UR STRIP-MCNC: NEGATIVE MG/DL
HCT VFR BLD AUTO: 31.9 % (ref 35–47)
HGB BLD-MCNC: 10.2 G/DL (ref 11.7–15.7)
HGB UR QL STRIP: NEGATIVE
KETONES UR STRIP-MCNC: ABNORMAL MG/DL
LEUKOCYTE ESTERASE UR QL STRIP: ABNORMAL
MCH RBC QN AUTO: 31 PG (ref 26.5–33)
MCHC RBC AUTO-ENTMCNC: 32 G/DL (ref 31.5–36.5)
MCV RBC AUTO: 97 FL (ref 78–100)
MUCOUS THREADS #/AREA URNS LPF: PRESENT /LPF
NITRATE UR QL: NEGATIVE
PH UR STRIP: 5.5 PH (ref 5–7)
PLATELET # BLD AUTO: 196 10E9/L (ref 150–450)
POTASSIUM SERPL-SCNC: 4.7 MMOL/L (ref 3.4–5.3)
RBC # BLD AUTO: 3.29 10E12/L (ref 3.8–5.2)
RBC #/AREA URNS AUTO: 0 /HPF (ref 0–2)
SODIUM SERPL-SCNC: 135 MMOL/L (ref 133–144)
SOURCE: ABNORMAL
SP GR UR STRIP: 1.02 (ref 1–1.03)
SQUAMOUS #/AREA URNS AUTO: 1 /HPF (ref 0–1)
UROBILINOGEN UR STRIP-MCNC: 6 MG/DL (ref 0–2)
WBC # BLD AUTO: 8 10E9/L (ref 4–11)
WBC #/AREA URNS AUTO: >182 /HPF (ref 0–5)
WBC CLUMPS #/AREA URNS HPF: PRESENT /HPF

## 2021-05-09 PROCEDURE — 250N000013 HC RX MED GY IP 250 OP 250 PS 637: Performed by: HOSPITALIST

## 2021-05-09 PROCEDURE — 81001 URINALYSIS AUTO W/SCOPE: CPT | Performed by: NURSE PRACTITIONER

## 2021-05-09 PROCEDURE — 250N000013 HC RX MED GY IP 250 OP 250 PS 637: Performed by: INTERNAL MEDICINE

## 2021-05-09 PROCEDURE — 258N000003 HC RX IP 258 OP 636: Performed by: HOSPITALIST

## 2021-05-09 PROCEDURE — 97116 GAIT TRAINING THERAPY: CPT | Mod: GP | Performed by: PHYSICAL THERAPIST

## 2021-05-09 PROCEDURE — 99232 SBSQ HOSP IP/OBS MODERATE 35: CPT | Performed by: HOSPITALIST

## 2021-05-09 PROCEDURE — 36415 COLL VENOUS BLD VENIPUNCTURE: CPT | Performed by: HOSPITALIST

## 2021-05-09 PROCEDURE — 97530 THERAPEUTIC ACTIVITIES: CPT | Mod: GP | Performed by: PHYSICAL THERAPIST

## 2021-05-09 PROCEDURE — G0378 HOSPITAL OBSERVATION PER HR: HCPCS

## 2021-05-09 PROCEDURE — 97161 PT EVAL LOW COMPLEX 20 MIN: CPT | Mod: GP | Performed by: PHYSICAL THERAPIST

## 2021-05-09 PROCEDURE — 87088 URINE BACTERIA CULTURE: CPT | Performed by: HOSPITALIST

## 2021-05-09 PROCEDURE — 87086 URINE CULTURE/COLONY COUNT: CPT | Performed by: HOSPITALIST

## 2021-05-09 PROCEDURE — 120N000001 HC R&B MED SURG/OB

## 2021-05-09 PROCEDURE — 87186 SC STD MICRODIL/AGAR DIL: CPT | Performed by: HOSPITALIST

## 2021-05-09 PROCEDURE — 80048 BASIC METABOLIC PNL TOTAL CA: CPT | Performed by: HOSPITALIST

## 2021-05-09 PROCEDURE — 85027 COMPLETE CBC AUTOMATED: CPT | Performed by: HOSPITALIST

## 2021-05-09 RX ORDER — POLYETHYLENE GLYCOL 3350 17 G/17G
17 POWDER, FOR SOLUTION ORAL DAILY PRN
Status: DISCONTINUED | OUTPATIENT
Start: 2021-05-09 | End: 2021-05-22 | Stop reason: HOSPADM

## 2021-05-09 RX ORDER — ONDANSETRON 4 MG/1
4 TABLET, ORALLY DISINTEGRATING ORAL EVERY 6 HOURS PRN
Status: DISCONTINUED | OUTPATIENT
Start: 2021-05-09 | End: 2021-05-22 | Stop reason: HOSPADM

## 2021-05-09 RX ORDER — NALOXONE HYDROCHLORIDE 0.4 MG/ML
0.4 INJECTION, SOLUTION INTRAMUSCULAR; INTRAVENOUS; SUBCUTANEOUS
Status: DISCONTINUED | OUTPATIENT
Start: 2021-05-09 | End: 2021-05-22 | Stop reason: HOSPADM

## 2021-05-09 RX ORDER — AMOXICILLIN 250 MG
1 CAPSULE ORAL AT BEDTIME
Status: DISCONTINUED | OUTPATIENT
Start: 2021-05-09 | End: 2021-05-22 | Stop reason: HOSPADM

## 2021-05-09 RX ORDER — ISOSORBIDE DINITRATE 10 MG/1
10 TABLET ORAL 3 TIMES DAILY
Status: DISCONTINUED | OUTPATIENT
Start: 2021-05-09 | End: 2021-05-22 | Stop reason: HOSPADM

## 2021-05-09 RX ORDER — AMLODIPINE BESYLATE 10 MG/1
10 TABLET ORAL DAILY
Status: DISCONTINUED | OUTPATIENT
Start: 2021-05-09 | End: 2021-05-22 | Stop reason: HOSPADM

## 2021-05-09 RX ORDER — POTASSIUM CHLORIDE 750 MG/1
10 TABLET, EXTENDED RELEASE ORAL DAILY
Status: ON HOLD | COMMUNITY
End: 2021-10-09

## 2021-05-09 RX ORDER — ACETAMINOPHEN 325 MG/1
650 TABLET ORAL EVERY 4 HOURS PRN
Status: DISCONTINUED | OUTPATIENT
Start: 2021-05-09 | End: 2021-05-22 | Stop reason: HOSPADM

## 2021-05-09 RX ORDER — SERTRALINE HYDROCHLORIDE 100 MG/1
100 TABLET, FILM COATED ORAL DAILY
Status: DISCONTINUED | OUTPATIENT
Start: 2021-05-09 | End: 2021-05-22 | Stop reason: HOSPADM

## 2021-05-09 RX ORDER — ACETAMINOPHEN 650 MG/1
650 SUPPOSITORY RECTAL EVERY 4 HOURS PRN
Status: DISCONTINUED | OUTPATIENT
Start: 2021-05-09 | End: 2021-05-22 | Stop reason: HOSPADM

## 2021-05-09 RX ORDER — GABAPENTIN 100 MG/1
200 CAPSULE ORAL 2 TIMES DAILY
Status: DISCONTINUED | OUTPATIENT
Start: 2021-05-09 | End: 2021-05-18

## 2021-05-09 RX ORDER — SODIUM CHLORIDE 9 MG/ML
INJECTION, SOLUTION INTRAVENOUS CONTINUOUS
Status: DISCONTINUED | OUTPATIENT
Start: 2021-05-09 | End: 2021-05-15

## 2021-05-09 RX ORDER — NALOXONE HYDROCHLORIDE 0.4 MG/ML
0.2 INJECTION, SOLUTION INTRAMUSCULAR; INTRAVENOUS; SUBCUTANEOUS
Status: DISCONTINUED | OUTPATIENT
Start: 2021-05-09 | End: 2021-05-22 | Stop reason: HOSPADM

## 2021-05-09 RX ORDER — CARVEDILOL 12.5 MG/1
12.5 TABLET ORAL 2 TIMES DAILY WITH MEALS
Status: DISCONTINUED | OUTPATIENT
Start: 2021-05-09 | End: 2021-05-21

## 2021-05-09 RX ORDER — ONDANSETRON 2 MG/ML
4 INJECTION INTRAMUSCULAR; INTRAVENOUS EVERY 6 HOURS PRN
Status: DISCONTINUED | OUTPATIENT
Start: 2021-05-09 | End: 2021-05-22 | Stop reason: HOSPADM

## 2021-05-09 RX ORDER — LIDOCAINE 4 G/G
2 PATCH TOPICAL
Status: DISCONTINUED | OUTPATIENT
Start: 2021-05-09 | End: 2021-05-17

## 2021-05-09 RX ORDER — ACETAMINOPHEN 325 MG/1
975 TABLET ORAL 3 TIMES DAILY
Status: DISCONTINUED | OUTPATIENT
Start: 2021-05-09 | End: 2021-05-21

## 2021-05-09 RX ORDER — CIPROFLOXACIN 250 MG/1
250 TABLET, FILM COATED ORAL EVERY 12 HOURS
Status: DISCONTINUED | OUTPATIENT
Start: 2021-05-09 | End: 2021-05-11

## 2021-05-09 RX ORDER — TRAMADOL HYDROCHLORIDE 50 MG/1
50 TABLET ORAL EVERY 6 HOURS PRN
Status: DISCONTINUED | OUTPATIENT
Start: 2021-05-09 | End: 2021-05-21

## 2021-05-09 RX ADMIN — GABAPENTIN 200 MG: 100 CAPSULE ORAL at 10:46

## 2021-05-09 RX ADMIN — LIDOCAINE 2 PATCH: 560 PATCH PERCUTANEOUS; TOPICAL; TRANSDERMAL at 20:04

## 2021-05-09 RX ADMIN — DICLOFENAC SODIUM 2 G: 10 GEL TOPICAL at 08:52

## 2021-05-09 RX ADMIN — CARVEDILOL 12.5 MG: 12.5 TABLET, FILM COATED ORAL at 10:47

## 2021-05-09 RX ADMIN — ISOSORBIDE DINITRATE 10 MG: 10 TABLET ORAL at 20:04

## 2021-05-09 RX ADMIN — CARVEDILOL 12.5 MG: 12.5 TABLET, FILM COATED ORAL at 18:10

## 2021-05-09 RX ADMIN — LIDOCAINE 2 PATCH: 560 PATCH PERCUTANEOUS; TOPICAL; TRANSDERMAL at 00:35

## 2021-05-09 RX ADMIN — ISOSORBIDE DINITRATE 10 MG: 10 TABLET ORAL at 10:47

## 2021-05-09 RX ADMIN — ACETAMINOPHEN 975 MG: 325 TABLET, FILM COATED ORAL at 15:54

## 2021-05-09 RX ADMIN — GABAPENTIN 200 MG: 100 CAPSULE ORAL at 20:04

## 2021-05-09 RX ADMIN — CIPROFLOXACIN HYDROCHLORIDE 250 MG: 250 TABLET, FILM COATED ORAL at 03:40

## 2021-05-09 RX ADMIN — TRAMADOL HYDROCHLORIDE 50 MG: 50 TABLET, COATED ORAL at 15:53

## 2021-05-09 RX ADMIN — AMLODIPINE BESYLATE 10 MG: 10 TABLET ORAL at 13:19

## 2021-05-09 RX ADMIN — SODIUM CHLORIDE, PRESERVATIVE FREE: 5 INJECTION INTRAVENOUS at 00:23

## 2021-05-09 RX ADMIN — OMEPRAZOLE 20 MG: 20 CAPSULE, DELAYED RELEASE ORAL at 10:48

## 2021-05-09 RX ADMIN — DICLOFENAC SODIUM 2 G: 10 GEL TOPICAL at 13:19

## 2021-05-09 RX ADMIN — DICLOFENAC SODIUM 2 G: 10 GEL TOPICAL at 20:04

## 2021-05-09 RX ADMIN — ACETAMINOPHEN 975 MG: 325 TABLET, FILM COATED ORAL at 08:50

## 2021-05-09 RX ADMIN — SERTRALINE HYDROCHLORIDE 100 MG: 100 TABLET ORAL at 10:49

## 2021-05-09 RX ADMIN — SODIUM CHLORIDE, PRESERVATIVE FREE: 5 INJECTION INTRAVENOUS at 20:03

## 2021-05-09 RX ADMIN — LISINOPRIL 30 MG: 20 TABLET ORAL at 10:47

## 2021-05-09 RX ADMIN — Medication 25 MG: at 00:35

## 2021-05-09 RX ADMIN — CIPROFLOXACIN HYDROCHLORIDE 250 MG: 250 TABLET, FILM COATED ORAL at 15:54

## 2021-05-09 RX ADMIN — TRAMADOL HYDROCHLORIDE 50 MG: 50 TABLET, COATED ORAL at 09:02

## 2021-05-09 RX ADMIN — DICLOFENAC SODIUM 2 G: 10 GEL TOPICAL at 16:00

## 2021-05-09 RX ADMIN — ACETAMINOPHEN 650 MG: 325 TABLET, FILM COATED ORAL at 00:23

## 2021-05-09 NOTE — PLAN OF CARE
Pt is up with standby assist and walker. Pt is voiding frequently.  Pt complains of back pain . Pt was given 50 mg tramadol. Pt had relief .  Pt is waiting for orthotics to come  with a back brace.  While eating lunch this afternoon pt stated that a piece of meat got stuck in her throat.  Pt was coughing and able to talk and drink water.  Pt feels like the piece of meat is still stuck. Will continue to monitor.

## 2021-05-09 NOTE — ED PROVIDER NOTES
History   Chief Complaint:  Flank Pain       HPI   Daya Ignacio is a 89 year old female with history of kidney stones, a lumbar compression fracture, sciatica, hypertension, NSTEMI, and hyperlipidemia who presents with left flank pain. The patient reports that pain suddenly started in her left Flank for the last 3 days. She denies dysuria, fever, and vomiting.     Review of Systems   Constitutional: Negative for fever.   Respiratory: Negative for shortness of breath.    Cardiovascular: Negative for chest pain.   Gastrointestinal: Negative for vomiting.   Genitourinary: Positive for flank pain. Negative for dysuria.   Musculoskeletal: Positive for back pain.   All other systems reviewed and are negative.      Allergies:  Atorvastatin  Nitrofurantoin  Penicillins  Macrobid  Seasonal allergies  Sulfa drugs    Medications:  Amlodipine  Coreg  Clonidine  Colace  Gabapentin  Isordil  Lisinopril  Omeprazole  Senna S  Zoloft    Past Medical History:    Anemia  Anxiety  Arthritis  Branch retinal vein occlusion or right eye  Depression  C diff  History of blood transfusion  Hypertension  Kidney stone  Lumbar compression fracture  Macular degeneration  Mild cognitive impairment  Mumps   Other chronic pain  Recurrent UTI  Upper GI bleed  Sepsis  AYAKA  NSTEMI  Hypertension  Hyperlipidemia  Sciatica  Hip fracture    Past Surgical History:    Cataract removal NOS  Cystoscopy  EGD  Shock wave lithotripsy  Hip pinning procedure  Hip replacement NOS  Internal fixation tibial plateau  Tonsillectomy  Adenoidectomy    Family History:    Alzheimer disease, mother  Heart attack, father  Parkinson's, brother    Social History:  Mother  Arrives via EMS  Unaccompanied in ED  Lives in skilled nursing facility    Physical Exam     Patient Vitals for the past 24 hrs:   BP Temp Pulse Resp SpO2   05/08/21 2200 (!) 154/67 -- 60 -- 97 %   05/08/21 2145 (!) 179/91 -- 74 -- 95 %   05/08/21 2130 (!) 160/78 -- 65 -- 97 %   05/08/21 2115 (!) 144/68  -- 63 16 97 %   05/08/21 2100 (!) 150/70 -- 66 -- 97 %   05/08/21 2045 (!) 153/70 -- 67 -- 96 %   05/08/21 2037 -- -- -- -- 94 %   05/08/21 2036 -- -- -- -- (!) 85 %   05/08/21 2030 (!) 141/64 -- 66 -- --   05/08/21 2000 134/66 98.4  F (36.9  C) 64 -- 94 %   05/08/21 1915 (!) 146/62 -- 66 -- 92 %       Physical Exam  General: Alert, Moderate discomfort, well kept  Eyes: PERRL, conjunctivae pink no scleral icterus or conjunctival injection  ENT:   Moist mucus membranes, posterior oropharynx clear without erythema or exudates, No lymphadenopathy, Normal voice  Resp:  Lungs clear to auscultation bilaterally, no crackles/rubs/wheezes. Good air movement  CV:  Normal rate and rhythm, no murmurs/rubs/gallops  GI:  Abdomen soft and non-distended.  Normoactive BS. Left flank/upper abdominal tenderness,  No guarding or rebound, No masses  Skin:  Warm, dry.  No rashes or petechiae  Musculoskeletal: No peripheral edema or calf tenderness, Normal gross ROM   Neuro: Alert and oriented to person/place/time, normal sensation  Psychiatric: Normal affect, cooperative, good eye contact    Emergency Department Course     Imaging:  CT Abdomen Pelvis w/o Contrast    1.  No evidence for obstructing ureteral calculi or hydronephrosis. Nonobstructing intrarenal calculi as detailed above.    2.  Mild dilatation of proximal small bowel loops in left midabdomen extending the upper central pelvis without a discrete transition point with normal tapering of distal small bowel loops up. Findings may be due to ileus related to an enteritis although   there is no significant mesenteric edema. Early partial small bowel obstruction cannot be excluded. Consider follow-up abdominal radiographs and Gastrografin challenge.    3.  Cholelithiasis.    4.  Left adnexal cystic lesion measuring 2.9 x 2.4 x 2.8 cm. Management recommendations as detailed below.    Management Recommendations:    Benign Appearing Cyst (round or oval; thin wall; fluid attenuation;  no solid areas; <10cm):     Late Postmenopausal Patient  (> 5years from LMP or age >55).  < 3 cm: Benign. No follow up.  > 3 cm: Ultrasound.    Reference: Guidelines for Incidental Benign or Probably Benign Adnexal Cyst on CT/MRI. Managing Incidental Findings on Abdominal and Pelvic CT and MRI, Part 1: White Paper of the ACR Incidental Finding Committee II on Adnexal Findings. Journal of the   American College of Radiology 2013; 10:675-681    Reads per radiology    Laboratory:  CBC: WBC 8.3, HGB 10.3 (L),     BMP: Anion gap 1 (L), glucose 105 (H), BUN 31 (H), GFR 58 (L) o/w WNL (Creatinine 0.87)     Asymptomatic COVID19 Virus PCR by nasopharyngeal swab pending      Emergency Department Course:    Reviewed:  I reviewed nursing notes, vitals, past medical history and care everywhere    Assessments:  1919 I obtained history and examined the patient as noted above.   2141 I rechecked the patient and explained findings. All questions are answered. The patient is ready to be admitted to the hospital.     Consults:   2216 I consulted with Dr. Gael Lang MD, the admitting hospitalist    Interventions:  1939 Normal Saline 1000 mL IV  2004 Dramamine 50 mg PO  2005 Toradol 15 mg IV  2005 Dilaudid 0.5 mg IV  2005 Zofran 4 mg IV  2124 Lidocare 1 patch transdermal    Disposition:  The patient was admitted to the hospital under the care of Dr. Gael Lang MD.       Impression & Plan     Medical Decision Making:  Daya Ignacio is a 89 year old female who presents today for evaluation of left-sided flank pain.  Patient has been having symptoms for the last 3 days.  She lives in an assisted living facility and they have been giving her Tylenol for this however was not managing her discomfort.  Her son saw her this evening and noted how much discomfort she was and therefore he brought her in for evaluation.  Her examination did show left flank and left upper abdominal tenderness.  With patient's history of kidney stones I  suspected this as the source.  I did obtain a CT scan which showed no evidence of kidney stones.  There is question of ileus versus early small bowel obstruction.  Her white cell count is normal.  She has been unable to provide us with a urine analysis but does not have other symptoms of urinary tract infection at this time.  Does not appear to be septic.  Laboratory studies are otherwise noncontributory.  Given the CT findings plan will be to admit patient to the hospital service for continued monitoring and treatment.    Incidentally a adnexal cyst was noted.  This should be followed up with OB.      Covid-19  Daya Ignacio was evaluated during a global COVID-19 pandemic, which necessitated consideration that the patient might be at risk for infection with the SARS-CoV-2 virus that causes COVID-19.   Applicable protocols for evaluation were followed during the patient's care.   COVID-19 was considered as part of the patient's evaluation. The plan for testing is:  a test was obtained during this visit.    Diagnosis:    ICD-10-CM    1. Back pain  M54.9 Asymptomatic SARS-CoV-2 COVID-19 Virus (Coronavirus) by PCR   2. Ileus (H)  K56.7     vs early small bowl obstruction   3. Adnexal cyst  N94.9        Discharge Medications:  New Prescriptions    No medications on file       Scribe Disclosure:  I, Sharan Mosley, am serving as a scribe at 7:21 PM on 5/8/2021 to document services personally performed by Tapan Green APRN based on my observations and the provider's statements to me.              Tapan Green APRN CNP  05/08/21 2236       Tapan Green APRN CNP  05/08/21 2238

## 2021-05-09 NOTE — PHARMACY-ADMISSION MEDICATION HISTORY
Admission medication history interview status for this patient is complete. See [facility] admission navigator for allergy information, prior to admission medications and immunization status.     Medication history interview done, indicate source(s):   Medication history resources (including written lists, pill bottles, clinic record): Medication List [facility]  Pharmacy: [facility]    Changes made to [facility] medication list:  Added: Klor-Con 10mEq  Deleted: None  Changed:   1. Clonidine 0.1mg BID --> 0.05mg BID    Actions taken by pharmacist (provider contacted, etc): Sticky note to MD    Additional medication history information:None    Medication reconciliation/reorder completed by provider prior to medication history?  Yes      Prior to Admission medications    Medication Sig Last Dose Taking? Auth Provider   acetaminophen (TYLENOL) 500 MG tablet Take 500 mg by mouth 3 times daily as needed for mild pain  Yes Unknown, Entered By History   amLODIPine (NORVASC) 10 MG tablet Take 10 mg by mouth daily  Yes Unknown, Entered By History   Calcium Carbonate (CALCIUM 600 PO) Take 1 tablet by mouth 2 times daily   Yes Reported, Patient   carvedilol (COREG) 25 MG tablet Take 25 mg by mouth At Bedtime  Yes Unknown, Entered By History   carvedilol (COREG) 25 MG tablet Take 12.5 mg by mouth every morning   Yes Reported, Patient   Cholecalciferol (VITAMIN D3) 2000 UNITS CAPS Take 2,000 Units by mouth daily (with dinner)  Yes Unknown, Entered By History   cloNIDine (CATAPRES) 0.1 MG tablet Take 0.05 mg by mouth 2 times daily   Yes Unknown, Entered By History   diclofenac (VOLTAREN) 1 % topical gel Place 2 g onto the skin 3 times daily as needed for moderate pain Apply to right knee  Yes Unknown, Entered By History   docusate sodium (COLACE) 100 MG capsule Take 1 capsule (100 mg) by mouth At Bedtime  Yes Seema Quintero APRN CNP   gabapentin (NEURONTIN) 100 MG capsule Take 2 capsules (200 mg) by mouth 2  times daily  Yes Amanda Kenyon MD   isosorbide dinitrate (ISORDIL) 10 MG tablet Take 1 tablet (10 mg) by mouth 3 times daily  Yes Marcy Soares PA-C   lisinopril (ZESTRIL) 30 MG tablet Take 30 mg by mouth daily  Yes Unknown, Entered By History   Multiple Vitamins-Minerals (PRESERVISION AREDS) CAPS Take 1 capsule by mouth 2 times daily   Yes Unknown, Entered By History   omeprazole (PRILOSEC) 20 MG DR capsule Take 20 mg by mouth daily   Yes Reported, Patient   polyethylene glycol (MIRALAX/GLYCOLAX) packet Take 17 g by mouth daily as needed for constipation  Yes Gisela Mehta PA-C   potassium chloride ER (KLOR-CON M) 10 MEQ CR tablet Take 10 mEq by mouth daily  Yes Unknown, Entered By History   SENNA-docusate sodium (SENNA S) 8.6-50 MG tablet Take 1 tablet by mouth At Bedtime  Yes Seema Quintero APRN CNP   sertraline (ZOLOFT) 100 MG tablet Take 100 mg by mouth daily  Yes Unknown, Entered By History   vitamin C (ASCORBIC ACID) 500 MG tablet Take 500 mg by mouth daily at 4pm   Yes Reported, Patient

## 2021-05-09 NOTE — PROGRESS NOTES
[facility]      OUTPATIENT PHYSICAL THERAPY EVALUATION  PLAN OF TREATMENT FOR OUTPATIENT REHABILITATION  (COMPLETE FOR INITIAL CLAIMS ONLY)  Patient's Last Name, First Name, M.I.  YOB: 1930  Daya Ignacio                           Provider's Name  [facility] Medical Record No.  [id number]                               Onset Date:  05/08/21   Start of Care Date:  05/09/21      Type:     _X_PT   ___OT   ___SLP Medical Diagnosis:  low back pain                        PT Diagnosis:  impaired gait/transfers   Visits from SOC:  1   _________________________________________________________________________________  Plan of Treatment/Functional Goals    Planned Interventions: bed mobility training, gait training, transfer training     Goals: See Physical Therapy Goals on Care Plan in [employer] electronic health record.    Therapy Frequency: Daily  Predicted Duration of Therapy Intervention: 3 days  _________________________________________________________________________________    I CERTIFY THE NEED FOR THESE SERVICES FURNISHED UNDER        THIS PLAN OF TREATMENT AND WHILE UNDER MY CARE     (Physician co-signature of this document indicates review and certification of the therapy plan).                Certification date from: 05/09/21, Certification date to: 05/11/21    Referring Physician: Gael Lang MD            Initial Assessment        See Physical Therapy evaluation dated 05/09/21 in Epic electronic health record.

## 2021-05-09 NOTE — PROGRESS NOTES
Cross cover notified of urinalysis results in.  Greater than 182 WBCs and large LE with negative nitrite.  History recurrent UTIs that are fairly resistant antibiotics.  Most recently she had VRE in addition to Citrobacter and Proteus that were quite resistant but both sensitive to Cipro.  She does not appear septic.  She does have significant pyuria so I feel obligated to treat at this time.  Initially will cover the previous Citrobacter and Proteus but not the VRE  -Start oral ciprofloxacin 250 mg twice daily  -Urine culture in process

## 2021-05-09 NOTE — ED NOTES
Bed: ED31  Expected date: 5/8/21  Expected time: 7:01 PM  Means of arrival: Ambulance  Comments:  [id number]

## 2021-05-09 NOTE — PROGRESS NOTES
05/09/21 1529   Quick Adds   Type of Visit Initial PT Evaluation   Living Environment   People in home alone   Current Living Arrangements independent living facility  (Sr. apt)   Home Accessibility no concerns   Transportation Anticipated family or friend will provide   Living Environment Comments lives in a Sr. apt   Self-Care   Usual Activity Tolerance good   Current Activity Tolerance moderate   Equipment Currently Used at Home walker, rolling  (4WW)   Disability/Function   Walking or Climbing Stairs Difficulty yes   Walking or Climbing Stairs ambulation difficulty, requires equipment   Fall history within last six months no   General Information   Onset of Illness/Injury or Date of Surgery 05/08/21   Referring Physician Gael Lang MD   Patient/Family Therapy Goals Statement (PT) return home   Pertinent History of Current Problem (include personal factors and/or comorbidities that impact the POC) per chart: Daya Ignacio is a 89 year old female with a past medical history of hypertension, depression, mild cognitive impairment, kidney stones, lumbar compression fractures, chronic pain, UTIs who presents with mid back pain; CT abdomen pelvis showed no obstructing ureteral calculi or hydronephrosis but nonobstructing intrarenal calculi.   CT did show chronic severe thoracic and lumbar compression fractures; see medical record for further information   Existing Precautions/Restrictions fall   General Observations patient on commode upon arrival; OK for eval per RN and patient   Cognition   Orientation Status (Cognition) person;situation   Cognitive Status Comments forgetful   Pain Assessment   Patient Currently in Pain Yes, see Vital Sign flowsheet  (back pain)   Posture    Posture Comments forward flexed at head and trunk   Range of Motion (ROM)   ROM Comment trunk limited by pain   Strength   Strength Comments mild functional weakness; further limited by back pain   Bed Mobility   Comment (Bed Mobility) SBA  for sit>supine with sequencing cues   Transfers   Transfer Safety Comments CGA for sit>stand from commode; use of walker in standing   Gait/Stairs (Locomotion)   Comment (Gait/Stairs) able to ambulate > 50 feet with use of a rolling walker and CGA/SBA initially   Balance   Balance Comments baseline impairment for which patient uses a walker; no gross LOB   Clinical Impression   Criteria for Skilled Therapeutic Intervention yes, treatment indicated   PT Diagnosis (PT) impaired gait/transfers   Influenced by the following impairments back pain; functional weakness   Functional limitations due to impairments impaired independence with mobility and cares   Clinical Presentation Stable/Uncomplicated   Clinical Presentation Rationale clinical judgement; level of assist   Clinical Decision Making (Complexity) low complexity   Therapy Frequency (PT) Daily   Predicted Duration of Therapy Intervention (days/wks) 3 days   Planned Therapy Interventions (PT) bed mobility training;gait training;transfer training   Anticipated Equipment Needs at Discharge (PT) walker, rolling;other (see comments)  (has own 4WW)   Risk & Benefits of therapy have been explained evaluation/treatment results reviewed;care plan/treatment goals reviewed;risks/benefits reviewed;current/potential barriers reviewed;participants voiced agreement with care plan;participants included;patient   PT Discharge Planning    PT Discharge Recommendation (DC Rec) home;home with assist;home with home care physical therapy   PT Rationale for DC Rec Anticipate with another 1-2 PT sessions patient will maximize independence with bed mobility, tx,gait; reports living alone but has family to help as needed; may benefit from family to help with meals/cleaning initially and Home PT/OT safety evals   PT Brief overview of current status  CGA to SBA for mobility with walker   Therapy Certification   Start of care date 05/09/21   Certification date from 05/09/21   Certification  date to 05/11/21   Medical Diagnosis low back pain   Total Evaluation Time   Total Evaluation Time (Minutes) 10

## 2021-05-09 NOTE — ED NOTES
[facility]  ED Nurse Handoff Report    Daya Ignacio is a 89 year old female   ED Chief complaint: Flank Pain  . ED Diagnosis:   Final diagnoses:   Back pain   Ileus (H) - vs early small bowl obstruction   Adnexal cyst     Allergies:   Allergies   Allergen Reactions     Atorvastatin Muscle Pain (Myalgia)     Macrobid [Nitrofurantoin Anhydrous] Other (See Comments)     Body aches     Nitrofurantoin Muscle Pain (Myalgia) and Unknown     Body aches       Penicillins Unknown     Seasonal Allergies Unknown     Sulfa Drugs Other (See Comments)     Tolerated Bactrim May 2019  Entire family has allergy to Sulfa     Sulfasalazine Unknown       Code Status: Full Code  Activity level - Baseline/Home:  Independent. Activity Level - Current:   Assist X 1. Lift room needed: No. Bariatric: No   Needed: No   Isolation: No. Infection: Not Applicable.     Vital Signs:   Vitals:    05/08/21 2115 05/08/21 2130 05/08/21 2145 05/08/21 2200   BP: (!) 144/68 (!) 160/78 (!) 179/91 (!) 154/67   Pulse: 63 65 74 60   Resp: 16      Temp:       SpO2: 97% 97% 95% 97%       Cardiac Rhythm:  ,      Pain level:    Patient confused: Yes. Patient Falls Risk: Yes.   Elimination Status: Patient had a couple small drops of urine, not enough for a sample  Patient Report - Initial Complaint:   ED Triage Notes Filed Pt arrives via EMS from skilled nursing facility for c/o left flank pain for the past couple of days. Pain uncontrolled at facility so son called 911 for patient to be brought to hospital. Pt's son told EMS pt has a hx of kidney stones. ABCs intact.       Focused Assessment:   Musculoskeletal Musculoskeletal - Musculoskeletal WDL: .WDL except  Pain Body Location: flank (left)       Tests Performed: labs, imaging Abnormal Results:   Labs Ordered and Resulted from Time of ED Arrival Up to the Time of Departure from the ED   CBC WITH PLATELETS - Abnormal; Notable for the following components:       Result Value    RBC  Count 3.25 (*)     Hemoglobin 10.3 (*)     Hematocrit 31.1 (*)     All other components within normal limits   BASIC METABOLIC PANEL - Abnormal; Notable for the following components:    Anion Gap 1 (*)     Glucose 105 (*)     Urea Nitrogen 31 (*)     GFR Estimate 58 (*)     All other components within normal limits   SARS-COV-2 (COVID-19) VIRUS RT-PCR   ROUTINE UA WITH MICROSCOPIC REFLEX TO CULTURE   MAY SALINE LOCK IV     CT Abdomen Pelvis w/o Contrast   Final Result   IMPRESSION:    1.  No evidence for obstructing ureteral calculi or hydronephrosis. Nonobstructing intrarenal calculi as detailed above.      2.  Mild dilatation of proximal small bowel loops in left midabdomen extending the upper central pelvis without a discrete transition point with normal tapering of distal small bowel loops up. Findings may be due to ileus related to an enteritis although    there is no significant mesenteric edema. Early partial small bowel obstruction cannot be excluded. Consider follow-up abdominal radiographs and Gastrografin challenge.      3.  Cholelithiasis.      4.  Left adnexal cystic lesion measuring 2.9 x 2.4 x 2.8 cm. Management recommendations as detailed below.      Management Recommendations:      Benign Appearing Cyst (round or oval; thin wall; fluid attenuation; no solid areas; <10cm):       Late Postmenopausal Patient  (> 5years from LMP or age >55).   < 3 cm: Benign. No follow up.   > 3 cm: Ultrasound.      Reference: Guidelines for Incidental Benign or Probably Benign Adnexal Cyst on CT/MRI. Managing Incidental Findings on Abdominal and Pelvic CT and MRI, Part 1: White Paper of the ACR Incidental Finding Committee II on Adnexal Findings. Journal of the    American College of Radiology 2013; 10:675-681           .   Treatments provided: see MAR  Family Comments: son was at bedside but has since gone home  OBS brochure/video discussed/provided to patient:  Yes  ED Medications:   Medications   Lidocaine  (LIDOCARE) 4 % Patch 1 patch (1 patch Transdermal Patch/Med Applied 5/8/21 2124)   0.9% sodium chloride BOLUS (0 mLs Intravenous Stopped 5/8/21 2124)   ketorolac (TORADOL) injection 15 mg (15 mg Intravenous Given 5/8/21 2005)   dimenhyDRINATE (DRAMAMINE) tablet 50 mg (50 mg Oral Given 5/8/21 2004)   HYDROmorphone (PF) (DILAUDID) injection 0.5 mg (0.5 mg Intravenous Given 5/8/21 2005)   ondansetron (ZOFRAN) 2 MG/ML injection (4 mg  Given 5/8/21 2005)     Drips infusing:  No  For the majority of the shift, the patient's behavior Green. Interventions performed were N/A.    Sepsis treatment initiated: No     Patient tested for COVID 19 prior to admission: YES    RECEIVING UNIT ED HANDOFF REVIEW    Above ED Nurse Handoff Report was reviewed: Yes    Reviewed by: Anitha Mcdermott RN on May 8, 2021 at 11:34 PM   ED Nurse Name/Phone Number: Shahnaz Wood RN,   10:35 PM

## 2021-05-09 NOTE — H&P
[facility]    History and Physical  Hospitalist       Date of Admission:  5/8/2021    Assessment & Plan   Daya Ignacio is a 89 year old female with a past medical history of hypertension, depression, mild cognitive impairment, kidney stones, lumbar compression fractures, chronic pain, UTIs who presents with mid back pain.    #Mid-back pain, suspect MSK pain: Daya tells me that 2 days ago she awoke from sleep in the a.m. with low back pain.  More on the left side of her lower back.  No radiation.  Described as moderate to severe in quality.  Worse with movement.  No trauma or fall.  She states she did have similar pain about 6 months ago that came on and then dissipated on its own.  She denies any dysuria or increased frequency of urination.  No nausea vomiting, diarrhea, constipation, chest pain, shortness of breath, fevers chills, headache, skin changes.  No weakness of her lower extremities.  No fecal or urinary incontinence.  She had her last bowel movement yesterday which she says was normal.  She is passing gas.  -ED, patient afebrile nontachycardic.  Hypertensive.  Placed on 2 L via nasal cannula after she got 0.5 mg IV dilaudid.  CT abdomen pelvis showed no obstructing ureteral calculi or hydronephrosis but nonobstructing intrarenal calculi.   BMP unremarkable.  CBC unremarkable.  Pt was given dilaudid and toradol.    -Given that this pain is worse with movement with CT scan not showing any evidence of obstructing nephrolithiasis, suspect musculoskeletal strain.  She does not have any point tenderness on her lumbar spine.  She is able to sit up in bed independently.  -We will treat with scheduled Tylenol, lidocaine patch, as needed tramadol.  Try to avoid higher dose opiates given she got quite somnolent in the ED with 0.5 mg IV Dilaudid.  -Benefit from working with PT in the a.m.    #History of recurrent UTI: Pt denies any dysuria or increased frequency of urination.  UA ordered by  ED.  To be collected.    #Possible radiologic early partial SBO vs ileus: There was mild dilation of the small bowel noted which can be seen due to ileus versus early partial small bowel obstruction.  She denies any nausea vomiting.  She had normal BM yesterday.  She states she is passing gas.  Clinically this does not seem like a SBO or ileus    #HTN: Continue home coreg, lisinopril and imdur once doses verified with hold parameters.   #Depression: Continue home sertraline    DVT Prophylaxis: Pneumatic Compression Devices  Code Status: DNR, DNI.  On facility paperwork and confirmed with patient.  Consistent with prior admission  Dispo: [place] to observation     Gael Lang MD    Primary Care Physician   DINORAH DISLA    Chief Complaint   Flank Pain    History is obtained from the patient, patient's chart and discussed with the ER provider    History of Present Illness   Daya Ignacio is a 89 year old female with a past medical history of hypertension, depression, mild cognitive impairment, kidney stones, lumbar compression fractures, chronic pain, UTIs who presents with mid back pain.    Daya tells me that 2 days ago she awoke from sleep in the a.m. with low back pain.  More on the left side of her lower back.  No radiation.  Described as moderate to severe in quality.  Worse with movement.  No trauma or fall.  She states she did have similar pain about 6 months ago that came on and then dissipated on its own.  She denies any dysuria or increased frequency of urination.  No nausea vomiting, diarrhea, constipation, chest pain, shortness of breath, fevers chills, headache, skin changes.  No weakness of her lower extremities.  No fecal or urinary incontinence.  She had her last bowel movement yesterday which she says was normal.  She is passing gas.    In the ED, patient afebrile nontachycardic.  Hypertensive.  Placed on 2 L via nasal cannula after she got 0.5 mg IV dilaudid.  CT abdomen pelvis showed no  obstructing ureteral calculi or hydronephrosis but nonobstructing intrarenal calculi.  There was mild dilation of the small bowel noted which can be seen due to ileus versus early partial small bowel obstruction.  BMP unremarkable.  CBC unremarkable.  Pt was given dilaudid and toradol.      Past Medical History    I have reviewed this patient's medical history and updated it with pertinent information if needed.   Past Medical History:   Diagnosis Date     Anemia      Anxiety      Arthritis      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 4/16/2014     Depression      h/o Clostridium difficile colitis 06/2019     History of blood transfusion      Hypertension      Kidney stone      Lumbar compression fracture (H)      Macular degeneration (senile) of retina, unspecified      Mild cognitive impairment      Mumps      Other chronic pain     low back     Recurrent UTI      Upper GI bleed 06/2019     Urinary tract infection due to extended-spectrum beta lactamase (ESBL)-producing Klebsiella 05/2019    resistant to Bactrim       Past Surgical History   I have reviewed this patient's surgical history and updated it with pertinent information if needed.  Past Surgical History:   Procedure Laterality Date     Cataract removal NOS Bilateral      COMBINED CYSTOSCOPY, INSERT STENT URETER(S) Left 5/6/2020    Procedure: Cystoscopy with left ureteral stent insertion;  Surgeon: Gaurav Munson MD;  Location:  OR     CYSTOSCOPY N/A 8/1/2019    Procedure: Exam under anesthesia, video cystopanendoscopy;  Surgeon: Dennis Carlson MD;  Location:  OR     ESOPHAGOSCOPY, GASTROSCOPY, DUODENOSCOPY (EGD), COMBINED N/A 6/11/2019    Procedure: ESOPHAGOGASTRODUODENOSCOPY (EGD);  Surgeon: Gaurav Degroot MD;  Location:  GI     EXTRACORPOREAL SHOCK WAVE LITHOTRIPSY (ESWL) Left 5/28/2020    Procedure: LEFT EXTRACORPOREAL SHOCK WAVE LITHOTRIPSY;  Surgeon: Gaurav Munson MD;  Location:  OR     Hip Pinning procedure  Left      Hip replacement NOS Right      LASER HOLMIUM LITHOTRIPSY URETER(S), INSERT STENT, COMBINED Left 10/30/2020    Procedure: Cystoscopy, left ureteral stent exchange, left retrograde pyelogram, interpretation of fluoroscopic images, left ureteroscopy with holmium lithotripsy and stone basketing;  Surgeon: Gaurav Munson MD;  Location: [facility] OR     OPEN REDUCTION INTERNAL FIXATION TIBIAL PLATEAU Right 8/28/2019    Procedure: Open reduction internal fixation right proximal tibia fracture;  Surgeon: Molina Pardo MD;  Location: [facility] OR     TONSILLECTOMY, ADENOIDECTOMY, COMBINED         Prior to Admission Medications   Prior to Admission Medications   Prescriptions Last Dose Informant Patient Reported? Taking?   Calcium Carbonate (CALCIUM 600 PO)   Yes No   Sig: Take 1 tablet by mouth 2 times daily    Cholecalciferol (VITAMIN D3) 2000 UNITS CAPS   Yes No   Sig: Take 2,000 Units by mouth daily (with dinner)   Multiple Vitamins-Minerals (PRESERVISION AREDS) CAPS   Yes No   Sig: Take 1 capsule by mouth 2 times daily    SENNA-docusate sodium (SENNA S) 8.6-50 MG tablet   No No   Sig: Take 1 tablet by mouth At Bedtime   acetaminophen (TYLENOL) 500 MG tablet   Yes No   Sig: Take 500 mg by mouth 3 times daily as needed for mild pain   amLODIPine (NORVASC) 10 MG tablet   Yes No   Sig: Take 10 mg by mouth daily   carvedilol (COREG) 25 MG tablet   Yes No   Sig: Take 12.5 mg by mouth every morning    carvedilol (COREG) 25 MG tablet   Yes No   Sig: Take 25 mg by mouth At Bedtime   cloNIDine (CATAPRES) 0.1 MG tablet   Yes No   Sig: Take 0.1 mg by mouth 2 times daily   diclofenac (VOLTAREN) 1 % topical gel   Yes No   Sig: Place 2 g onto the skin 3 times daily as needed for moderate pain Apply to right knee   docusate sodium (COLACE) 100 MG capsule   No No   Sig: Take 1 capsule (100 mg) by mouth At Bedtime   gabapentin (NEURONTIN) 100 MG capsule   No No   Sig: Take 2 capsules (200 mg) by mouth 2 times daily    isosorbide dinitrate (ISORDIL) 10 MG tablet   No No   Sig: Take 1 tablet (10 mg) by mouth 3 times daily   lisinopril (ZESTRIL) 30 MG tablet   Yes No   Sig: Take 30 mg by mouth daily   omeprazole 20 MG tablet   Yes No   Sig: Take 20 mg by mouth daily    polyethylene glycol (MIRALAX/GLYCOLAX) packet   No No   Sig: Take 17 g by mouth daily as needed for constipation   sertraline (ZOLOFT) 100 MG tablet   Yes No   Sig: Take 100 mg by mouth daily   vitamin C (ASCORBIC ACID) 500 MG tablet   Yes No   Sig: Take 500 mg by mouth daily at 4pm       Facility-Administered Medications: None     Allergies   Allergies   Allergen Reactions     Atorvastatin Muscle Pain (Myalgia)     Macrobid [Nitrofurantoin Anhydrous] Other (See Comments)     Body aches     Nitrofurantoin Muscle Pain (Myalgia) and Unknown     Body aches       Penicillins Unknown     Seasonal Allergies Unknown     Sulfa Drugs Other (See Comments)     Tolerated Bactrim May 2019  Entire family has allergy to Sulfa     Sulfasalazine Unknown       Social History   I have reviewed this patient's social history and updated it with pertinent information if needed. Daya Ignacio  reports that she has quit smoking. Her smoking use included cigarettes. She has never used smokeless tobacco. She reports that she does not drink alcohol or use drugs.    Family History   I have reviewed this patient's family history and updated it with pertinent information if needed.   Family History   Problem Relation Age of Onset     Alzheimer Disease Mother         mild     Cardiovascular Father         heart attack     Neurologic Disorder Brother 83        Parkinson's       Review of Systems   The 10 point Review of Systems is negative other than noted in the HPI or here.     Physical Exam   Temp: 98.4  F (36.9  C)   BP: (!) 154/67 Pulse: 60   Resp: 16 SpO2: 97 % O2 Device: Nasal cannula Oxygen Delivery: 2 LPM  Vital Signs with Ranges  Temp:  [98.4  F (36.9  C)] 98.4  F (36.9  C)  Pulse:   [60-74] 60  Resp:  [16] 16  BP: (134-179)/(62-91) 154/67  SpO2:  [85 %-97 %] 97 %  0 lbs 0 oz    Constitutional: Elderly female, NAD. Conversational.   HEENT: Normocephalic, MMM, no elevation of JVD noted  Respiratory: Nl WOB, Clear bilaterally, No wheezes, no crackles  Cardiovascular: Regular, + systolic murmur  GI: Nondistended.  Bowel sounds are present.  Nontender.  No rebound or guarding  Lymph/Hematologic: No bruising. No cervical LAD  Skin: No rash  Musculoskeletal: Patient able to sit up in bed independently.  She has no point tenderness on her cervical thoracic or lumbar spine.  She has some paraspinal tenderness to palpation more on the left side in the lumbar region.  Not severe.  She moves all extremities.  Normal tone.  Neurologic: A&Ox3, she knows it is Mother's Day tomorrow.  Answers appropriately. CNII-XII intact. Moves all extremities. No tremor  Psychiatric: Calm    Data   Data reviewed today:  I personally reviewed  Recent Labs   Lab 05/08/21  1939   WBC 8.3   HGB 10.3*   MCV 96         POTASSIUM 3.8   CHLORIDE 104   CO2 29   BUN 31*   CR 0.87   ANIONGAP 1*   DAVE 9.0   *       Recent Results (from the past 24 hour(s))   CT Abdomen Pelvis w/o Contrast    Narrative    EXAM: CT ABDOMEN PELVIS W/O CONTRAST  LOCATION: [facility]  DATE/TIME: 5/8/2021 8:17 PM    INDICATION: Left flank pain  COMPARISON: 05/06/2020, 07/04/2019  TECHNIQUE: CT scan of the abdomen and pelvis was performed without IV contrast. Multiplanar reformats were obtained. Dose reduction techniques were used.  CONTRAST: None.    FINDINGS:   LOWER CHEST: Minimal atelectasis and scarring in both lung bases. Dense aortic and mitral valve calcifications. Moderate coronary artery calcification.    HEPATOBILIARY: Cholelithiasis with small stones in the gallbladder lumen. No biliary dilatation. Noncontrast liver unremarkable.    PANCREAS: Normal.    SPLEEN: Normal.    ADRENAL GLANDS:  Normal.    KIDNEYS/BLADDER: Allowing for streak artifact in the pelvis, there is no evidence of for obstructing ureteral calculi. No bladder calculi. No hydronephrosis or ureteral dilatation. 2 mm calcification right posterior mid kidney. Nonobstructing 5 mm   calculus left mid kidney.    BOWEL: Mild dilatation of the proximal small bowel loops in the left mid abdomen extending into the central abdomen and upper central pelvis. No discrete transition point as this extends into normal tapering distal small bowel. No inflammatory change   evident. Findings may represent ileus or early partial small bowel obstruction. Colonic diverticulosis without diverticulitis. Large amount of stool throughout colon. Appendix unremarkable.    LYMPH NODES: No lymphadenopathy.    VASCULATURE: Marked atherosclerotic vascular calcification. Normal caliber aorta.    PELVIC ORGANS: Left adnexal cystic lesion measuring 2.9 x 3.4 x 2.8 cm. No free fluid.    MUSCULOSKELETAL: Right MARCIA. Postoperative changes proximal left femur. Old healed the right symphysis pubis fracture. Chronic appearing severe T11-L3 superior and inferior endplate compression fractures greatest at L1 with greater than 50% loss of   vertebral body height throughout all these levels.      Impression    IMPRESSION:   1.  No evidence for obstructing ureteral calculi or hydronephrosis. Nonobstructing intrarenal calculi as detailed above.    2.  Mild dilatation of proximal small bowel loops in left midabdomen extending the upper central pelvis without a discrete transition point with normal tapering of distal small bowel loops up. Findings may be due to ileus related to an enteritis although   there is no significant mesenteric edema. Early partial small bowel obstruction cannot be excluded. Consider follow-up abdominal radiographs and Gastrografin challenge.    3.  Cholelithiasis.    4.  Left adnexal cystic lesion measuring 2.9 x 2.4 x 2.8 cm. Management recommendations as  detailed below.    Management Recommendations:    Benign Appearing Cyst (round or oval; thin wall; fluid attenuation; no solid areas; <10cm):     Late Postmenopausal Patient  (> 5years from LMP or age >55).  < 3 cm: Benign. No follow up.  > 3 cm: Ultrasound.    Reference: Guidelines for Incidental Benign or Probably Benign Adnexal Cyst on CT/MRI. Managing Incidental Findings on Abdominal and Pelvic CT and MRI, Part 1: White Paper of the ACR Incidental Finding Committee II on Adnexal Findings. Journal of the   American College of Radiology 2013; 10:675-681          minimum assist (75% patients effort)

## 2021-05-09 NOTE — ED TRIAGE NOTES
Pt arrives via EMS from skilled nursing facility for c/o left flank pain for the past couple of days. Pain uncontrolled at facility so son called 911 for patient to be brought to hospital. Pt's son told EMS pt has a hx of kidney stones. ABCs intact.

## 2021-05-09 NOTE — PLAN OF CARE
Pt A/O, forgetful. Urine urgency, sets off bed alarm multiple times. Up with assist of 1. Voiding frequently in small amounts. Started on PO cipro. Back pain managed with Tramadol and lidocaine patches. O2 drops after ambulation. Contact isolation precautions. On and off 1 L O2. New redness noted around face, continue to monitor.

## 2021-05-09 NOTE — PROGRESS NOTES
[facility]    Hospitalist Progress Note      Assessment & Plan   Daya Ignacio is a 89 year old female with a past medical history of hypertension, depression, mild cognitive impairment, kidney stones, lumbar compression fractures, chronic pain, UTIs who presents with mid back pain.     #Mid-back pain, suspect MSK pain: Daya tells me that 2 days ago she awoke from sleep in the a.m. with low back pain.  More on the left side of her lower back.  No radiation.  Described as moderate to severe in quality.  Worse with movement.  No trauma or fall.  She states she did have similar pain about 6 months ago that came on and then dissipated on its own.  She denies any dysuria or increased frequency of urination.  No nausea vomiting, diarrhea, constipation, chest pain, shortness of breath, fevers chills, headache, skin changes.  No weakness of her lower extremities.  No fecal or urinary incontinence.    She tells me she is passing gas and had a BM prior to admission.  -Patient continues to be afebrile, nontachycardic, nontachypneic with a normal white count.  -CT abdomen pelvis showed no obstructing ureteral calculi or hydronephrosis but nonobstructing intrarenal calculi.   CT did show chronic severe thoracic and lumbar compression fractures.    -Given that this pain is worse with movement with CT scan not showing any evidence of obstructing nephrolithiasis, suspect musculoskeletal strain in the setting of chronic back pain from her compression fractures.    -Scheduled Tylenol, lidocaine patch.  Topicals.  I have increased tramadol to 50 mg every 6 hours as needed.  PT consulted.  I have also consulted orthotics given compression fractures.  -I discussed with her son, Gael at the bedside.  He notes that she has chronic back pain and has been on oxycodone in the past.  This was stopped 1 to 2 months ago.  Reviewed imaging results and lab results with him.     #History of recurrent UTI, abnormal UA: UA  is again abnormal with possible indication of infection.  Reviewed her chart including infectious disease notes.  She is likely chronically going to have abnormal UAs and is colonized.  She denies any dysuria or increased frequency of urination.  She was started on ciprofloxacin overnight.  Okay to continue this for a short course and follow urine culture.     #Possible radiologic early partial SBO vs ileus: There was mild dilation of the small bowel noted which can be seen due to ileus versus early partial small bowel obstruction.  She denies any nausea vomiting.  She had normal BM prior to admission. She states she is passing gas.  Clinically this does not seem like a SBO or ileus     #HTN: Continue home coreg, lisinopril and imdur.  Resumed home amlodipine today.   #Depression: Continue home sertraline     DVT Prophylaxis: Pneumatic Compression Devices  Code Status: DNR, DNI.  On facility paperwork and confirmed with patient.  Consistent with prior admission  Dispo: Continue cares for pain control.  Possible discharge tomorrow    Gael Lang MD  Text Page    Interval History   Patient continues to complain of mid to lower back pain.  Worse on the left side.  Worse with movement.  Helped with tramadol.  Denies any dysuria or increased frequency of urination.  No nausea vomiting.  She is passing gas.  Denies chest pain or shortness of breath.    -Data reviewed today: I reviewed all new labs and imaging results over the last 24 hours.     Physical Exam   Temp: 97.2  F (36.2  C) Temp src: Temporal BP: (!) 173/73 Pulse: 65   Resp: 16 SpO2: 97 % O2 Device: Nasal cannula Oxygen Delivery: 1 LPM  Vitals:    05/08/21 2351   Weight: 58.2 kg (128 lb 6.4 oz)     Vital Signs with Ranges  Temp:  [97  F (36.1  C)-98.4  F (36.9  C)] 97.2  F (36.2  C)  Pulse:  [56-74] 65  Resp:  [16] 16  BP: (119-179)/(53-91) 173/73  SpO2:  [85 %-97 %] 97 %  I/O last 3 completed shifts:  In: -   Out: 300 [Urine:300]    Constitutional: Elderly,  lying in bed asleep.  She is arousable.  HEENT: Normocephalic. MMM, No elevation of JVD noted.   Respiratory: Nl WOB, Clear bilaterally, No wheezes or crackles  Cardiovascular: Regular, no murmur  GI: BS+, NT, ND  Skin/Integumen: WWP, no rash. No edema  MSK: Patient able to turn in bed independently.  No point tenderness through her spine.  She has some paraspinal tenderness on the left side and the thoracic to lumbar region.  She moves all extremities.  Thin extremities but normal tone.  Neuro: CNII-XII intact. Moves all extremities. No tremor. A&Ox3.    Medications     sodium chloride 100 mL/hr at 05/09/21 0023       acetaminophen  975 mg Oral TID     carvedilol  12.5 mg Oral BID w/meals     ciprofloxacin  250 mg Oral Q12H     diclofenac  2 g Topical 4x Daily     gabapentin  200 mg Oral BID     isosorbide dinitrate  10 mg Oral TID     lidocaine  2 patch Transdermal Q24h    And     lidocaine   Transdermal Q8H     lisinopril  30 mg Oral Daily     omeprazole  20 mg Oral Daily     sertraline  100 mg Oral Daily       Data   Recent Labs   Lab 05/09/21  0557 05/08/21  1939   WBC 8.0 8.3   HGB 10.2* 10.3*   MCV 97 96    193    134   POTASSIUM 4.7 3.8   CHLORIDE 106 104   CO2 28 29   BUN 26 31*   CR 0.80 0.87   ANIONGAP 1* 1*   DAVE 9.0 9.0   GLC 93 105*       Recent Results (from the past 24 hour(s))   CT Abdomen Pelvis w/o Contrast    Narrative    EXAM: CT ABDOMEN PELVIS W/O CONTRAST  LOCATION: [facility]  DATE/TIME: 5/8/2021 8:17 PM    INDICATION: Left flank pain  COMPARISON: 05/06/2020, 07/04/2019  TECHNIQUE: CT scan of the abdomen and pelvis was performed without IV contrast. Multiplanar reformats were obtained. Dose reduction techniques were used.  CONTRAST: None.    FINDINGS:   LOWER CHEST: Minimal atelectasis and scarring in both lung bases. Dense aortic and mitral valve calcifications. Moderate coronary artery calcification.    HEPATOBILIARY: Cholelithiasis with small stones in the  gallbladder lumen. No biliary dilatation. Noncontrast liver unremarkable.    PANCREAS: Normal.    SPLEEN: Normal.    ADRENAL GLANDS: Normal.    KIDNEYS/BLADDER: Allowing for streak artifact in the pelvis, there is no evidence of for obstructing ureteral calculi. No bladder calculi. No hydronephrosis or ureteral dilatation. 2 mm calcification right posterior mid kidney. Nonobstructing 5 mm   calculus left mid kidney.    BOWEL: Mild dilatation of the proximal small bowel loops in the left mid abdomen extending into the central abdomen and upper central pelvis. No discrete transition point as this extends into normal tapering distal small bowel. No inflammatory change   evident. Findings may represent ileus or early partial small bowel obstruction. Colonic diverticulosis without diverticulitis. Large amount of stool throughout colon. Appendix unremarkable.    LYMPH NODES: No lymphadenopathy.    VASCULATURE: Marked atherosclerotic vascular calcification. Normal caliber aorta.    PELVIC ORGANS: Left adnexal cystic lesion measuring 2.9 x 3.4 x 2.8 cm. No free fluid.    MUSCULOSKELETAL: Right MARCIA. Postoperative changes proximal left femur. Old healed the right symphysis pubis fracture. Chronic appearing severe T11-L3 superior and inferior endplate compression fractures greatest at L1 with greater than 50% loss of   vertebral body height throughout all these levels.      Impression    IMPRESSION:   1.  No evidence for obstructing ureteral calculi or hydronephrosis. Nonobstructing intrarenal calculi as detailed above.    2.  Mild dilatation of proximal small bowel loops in left midabdomen extending the upper central pelvis without a discrete transition point with normal tapering of distal small bowel loops up. Findings may be due to ileus related to an enteritis although   there is no significant mesenteric edema. Early partial small bowel obstruction cannot be excluded. Consider follow-up abdominal radiographs and  Gastrografin challenge.    3.  Cholelithiasis.    4.  Left adnexal cystic lesion measuring 2.9 x 2.4 x 2.8 cm. Management recommendations as detailed below.    Management Recommendations:    Benign Appearing Cyst (round or oval; thin wall; fluid attenuation; no solid areas; <10cm):     Late Postmenopausal Patient  (> 5years from LMP or age >55).  < 3 cm: Benign. No follow up.  > 3 cm: Ultrasound.    Reference: Guidelines for Incidental Benign or Probably Benign Adnexal Cyst on CT/MRI. Managing Incidental Findings on Abdominal and Pelvic CT and MRI, Part 1: White Paper of the ACR Incidental Finding Committee II on Adnexal Findings. Journal of the   American College of Radiology 2013; 10:675-681

## 2021-05-10 ENCOUNTER — APPOINTMENT (OUTPATIENT)
Dept: PHYSICAL THERAPY | Facility: CLINIC | Age: 86
DRG: 690 | End: 2021-05-10
Payer: COMMERCIAL

## 2021-05-10 PROCEDURE — 97530 THERAPEUTIC ACTIVITIES: CPT | Mod: GP | Performed by: PHYSICAL THERAPIST

## 2021-05-10 PROCEDURE — G0378 HOSPITAL OBSERVATION PER HR: HCPCS

## 2021-05-10 PROCEDURE — 250N000013 HC RX MED GY IP 250 OP 250 PS 637: Performed by: HOSPITALIST

## 2021-05-10 PROCEDURE — 250N000013 HC RX MED GY IP 250 OP 250 PS 637: Performed by: INTERNAL MEDICINE

## 2021-05-10 PROCEDURE — 258N000003 HC RX IP 258 OP 636: Performed by: HOSPITALIST

## 2021-05-10 PROCEDURE — 99232 SBSQ HOSP IP/OBS MODERATE 35: CPT | Performed by: INTERNAL MEDICINE

## 2021-05-10 PROCEDURE — 97116 GAIT TRAINING THERAPY: CPT | Mod: GP | Performed by: PHYSICAL THERAPIST

## 2021-05-10 PROCEDURE — L0625 LO FLEX L1-BELOW L5 PRE OTS: HCPCS

## 2021-05-10 PROCEDURE — 120N000001 HC R&B MED SURG/OB

## 2021-05-10 RX ADMIN — CIPROFLOXACIN HYDROCHLORIDE 250 MG: 250 TABLET, FILM COATED ORAL at 15:49

## 2021-05-10 RX ADMIN — AMLODIPINE BESYLATE 10 MG: 10 TABLET ORAL at 08:19

## 2021-05-10 RX ADMIN — LISINOPRIL 30 MG: 20 TABLET ORAL at 08:19

## 2021-05-10 RX ADMIN — TRAMADOL HYDROCHLORIDE 50 MG: 50 TABLET, COATED ORAL at 20:08

## 2021-05-10 RX ADMIN — ACETAMINOPHEN 975 MG: 325 TABLET, FILM COATED ORAL at 00:45

## 2021-05-10 RX ADMIN — ACETAMINOPHEN 975 MG: 325 TABLET, FILM COATED ORAL at 13:20

## 2021-05-10 RX ADMIN — GABAPENTIN 200 MG: 100 CAPSULE ORAL at 08:18

## 2021-05-10 RX ADMIN — ACETAMINOPHEN 975 MG: 325 TABLET, FILM COATED ORAL at 08:18

## 2021-05-10 RX ADMIN — CARVEDILOL 12.5 MG: 12.5 TABLET, FILM COATED ORAL at 08:19

## 2021-05-10 RX ADMIN — ISOSORBIDE DINITRATE 10 MG: 10 TABLET ORAL at 08:18

## 2021-05-10 RX ADMIN — CARVEDILOL 12.5 MG: 12.5 TABLET, FILM COATED ORAL at 18:10

## 2021-05-10 RX ADMIN — ISOSORBIDE DINITRATE 10 MG: 10 TABLET ORAL at 13:20

## 2021-05-10 RX ADMIN — ISOSORBIDE DINITRATE 10 MG: 10 TABLET ORAL at 19:56

## 2021-05-10 RX ADMIN — TRAMADOL HYDROCHLORIDE 50 MG: 50 TABLET, COATED ORAL at 02:46

## 2021-05-10 RX ADMIN — DICLOFENAC SODIUM 2 G: 10 GEL TOPICAL at 15:50

## 2021-05-10 RX ADMIN — LIDOCAINE 2 PATCH: 560 PATCH PERCUTANEOUS; TOPICAL; TRANSDERMAL at 19:56

## 2021-05-10 RX ADMIN — SODIUM CHLORIDE, PRESERVATIVE FREE: 5 INJECTION INTRAVENOUS at 05:15

## 2021-05-10 RX ADMIN — DICLOFENAC SODIUM 2 G: 10 GEL TOPICAL at 11:16

## 2021-05-10 RX ADMIN — SERTRALINE HYDROCHLORIDE 100 MG: 100 TABLET ORAL at 08:18

## 2021-05-10 RX ADMIN — GABAPENTIN 200 MG: 100 CAPSULE ORAL at 19:56

## 2021-05-10 RX ADMIN — OMEPRAZOLE 20 MG: 20 CAPSULE, DELAYED RELEASE ORAL at 08:18

## 2021-05-10 RX ADMIN — CIPROFLOXACIN HYDROCHLORIDE 250 MG: 250 TABLET, FILM COATED ORAL at 05:14

## 2021-05-10 RX ADMIN — TRAMADOL HYDROCHLORIDE 50 MG: 50 TABLET, COATED ORAL at 11:22

## 2021-05-10 RX ADMIN — DOCUSATE SODIUM 50 MG AND SENNOSIDES 8.6 MG 1 TABLET: 8.6; 5 TABLET, FILM COATED ORAL at 20:09

## 2021-05-10 RX ADMIN — DICLOFENAC SODIUM 2 G: 10 GEL TOPICAL at 20:03

## 2021-05-10 ASSESSMENT — ACTIVITIES OF DAILY LIVING (ADL)
ADLS_ACUITY_SCORE: 18
ADLS_ACUITY_SCORE: 16

## 2021-05-10 NOTE — PROGRESS NOTES
PRIMARY DIAGNOSIS: UTI, Flank Pain  OUTPATIENT/OBSERVATION GOALS TO BE MET BEFORE DISCHARGE:  1. ADLs back to baseline: No    2. Activity and level of assistance: Up with standby assistance.    3. Pain status: Improved-controlled with oral pain medications.    4. Return to near baseline physical activity: No     Discharge Planner Nurse   Safe discharge environment identified: Yes  Barriers to discharge: Yes, pain management       Entered by: Kira Foote 05/10/2021 3:54 AM     Pt A/O x 4, elevated BP's, afebrile, on RA. LS clear, denies SOB. Up A1 pivot to commode, GB, walker. Regular diet, denies nausea. Pt is voiding frequently in small amounts. Purewick added during night d/t frequent urination and pain associated w/getting up. Regular diet, denies nausea, NS @ 100mL. Contact precautions maintained for ESBL and VRE. Cipro administered as ordered. Pain rated 10/10, managed w/ Tramadol and scheduled tylenol. Possible discharge home 5/10.

## 2021-05-10 NOTE — PROGRESS NOTES
[facility]    Hospitalist Progress Note      Assessment & Plan   Daya Ignacio is a 89 year old female with a past medical history of hypertension, depression, mild cognitive impairment, kidney stones, lumbar compression fractures, chronic pain, UTIs who presents with mid back pain.      #Mid-back pain, suspect MSK pain:   -Patient continues to be afebrile, nontachycardic, nontachypneic with a normal white count.  -CT abdomen pelvis showed no obstructing ureteral calculi or hydronephrosis but nonobstructing intrarenal calculi.   CT did show chronic severe thoracic and lumbar compression fractures.    -Given that this pain is worse with movement with CT scan not showing any evidence of obstructing nephrolithiasis, suspect musculoskeletal strain in the setting of chronic back pain from her compression fractures.    -Scheduled Tylenol, lidocaine patch.  Topicals.  Tramadol to 50 mg every 6 hours as needed.  PT consulted. consulted orthotics given compression fractures.  -I discussed with her son, Gael at the bedside.  He notes that she has chronic back pain and has been on oxycodone in the past.  This was stopped few months ago.       #History of recurrent UTI, abnormal UA: UA is again abnormal with possible indication of infection.  Reviewed her chart including infectious disease notes.  She is likely chronically going to have abnormal UAs and is colonized.  She denies any dysuria or increased frequency of urination.  She was started on ciprofloxacin overnight.  Okay to continue this for a short course and follow urine culture.     #Possible radiologic early partial SBO vs ileus: There was mild dilation of the small bowel noted which can be seen due to ileus versus early partial small bowel obstruction.  She denies any nausea vomiting.  She had normal BM prior to admission. She states she is passing gas.  Clinically this does not seem like a SBO or ileus     #HTN: Continue home coreg,  lisinopril and imdur.  Resumed home amlodipine    #Depression: Continue home sertraline  #Known hx of left Adnexal cyst     DVT Prophylaxis: Pneumatic Compression Devices  Code Status: DNR, DNI  Dispo: Continue cares for pain control.  Possible discharge tomorrow    Roberto Diaz MD      Interval History   Patient continues to complain of mid to lower back pain.  She describes the pain that initially brought her into the hospital is more in the flank, although the pain is more in the midline in the back now.  Which seems more typical of her usual chronic back pain.  Tramadol is helping, although still having pain.  No urinary symptoms.  No chest pain or shortness of breath.  Taking p.o.    -Data reviewed today: I reviewed all new labs and imaging results over the last 24 hours.     Physical Exam   Temp: 97.2  F (36.2  C) Temp src: Temporal BP: (!) 161/66 Pulse: 61   Resp: 16 SpO2: 93 % O2 Device: None (Room air) Oxygen Delivery: 2 LPM  Vitals:    05/08/21 2351   Weight: 58.2 kg (128 lb 6.4 oz)     Vital Signs with Ranges  Temp:  [96.6  F (35.9  C)-97.3  F (36.3  C)] 97.2  F (36.2  C)  Pulse:  [61-69] 61  Resp:  [14-16] 16  BP: (137-178)/(52-87) 161/66  SpO2:  [90 %-95 %] 93 %  I/O last 3 completed shifts:  In: 2997 [P.O.:960; I.V.:2037]  Out: 400 [Urine:400]    Constitutional: Elderly, lying in bed asleep.  She is arousable.  HEENT: Normocephalic. MMM, No elevation of JVD noted.   Respiratory: Nl WOB, Clear bilaterally, No wheezes or crackles  Cardiovascular: Regular, no murmur  GI: BS+, NT, ND  Skin/Integumen: WWP, no rash. No edema  MSK: Patient able to turn in bed independently.  No point tenderness through her spine.  She has some paraspinal tenderness on the left side and the lower thoracic region.  She moves all extremities.    Neuro: CNII-XII grossly intact. Moves all extremities. No tremor. A&Ox3.    Medications     sodium chloride 100 mL/hr at 05/10/21 0817       acetaminophen  975 mg Oral TID      amLODIPine  10 mg Oral Daily     carvedilol  12.5 mg Oral BID w/meals     ciprofloxacin  250 mg Oral Q12H     diclofenac  2 g Topical 4x Daily     gabapentin  200 mg Oral BID     isosorbide dinitrate  10 mg Oral TID     lidocaine  2 patch Transdermal Q24h    And     lidocaine   Transdermal Q8H     lisinopril  30 mg Oral Daily     omeprazole  20 mg Oral Daily     senna-docusate  1 tablet Oral At Bedtime     sertraline  100 mg Oral Daily       Data   Recent Labs   Lab 05/09/21  0557 05/08/21  1939   WBC 8.0 8.3   HGB 10.2* 10.3*   MCV 97 96    193    134   POTASSIUM 4.7 3.8   CHLORIDE 106 104   CO2 28 29   BUN 26 31*   CR 0.80 0.87   ANIONGAP 1* 1*   DAVE 9.0 9.0   GLC 93 105*       No results found for this or any previous visit (from the past 24 hour(s)).

## 2021-05-10 NOTE — PLAN OF CARE
"PRIMARY DIAGNOSIS: \"GENERIC\" NURSING  OUTPATIENT/OBSERVATION GOALS TO BE MET BEFORE DISCHARGE:  ADLs back to baseline: No    Activity and level of assistance: Up with maximum assistance. Consider SW and/or PT evaluation.     Pain status: Improved-controlled with oral pain medications.    Return to near baseline physical activity: No     Discharge Planner Nurse   Safe discharge environment identified: No possible TCU  Barriers to discharge: Yes. Pain management, urine culture pending and mobility.       Entered by: ANDREW BOSWELL 05/10/2021 12:56 PM     Please review provider order for any additional goals.   Nurse to notify provider when observation goals have been met and patient is ready for discharge.  "

## 2021-05-10 NOTE — PLAN OF CARE
[id number]: Pt resting comfortably. VSS. A&O, forgetful. Denies pain, on scheduled Voltaren gel. New IV placed. S.L. Transfers with Ax1. Up in the chair for meal.

## 2021-05-10 NOTE — UTILIZATION REVIEW
"Admission Status; Secondary Review Determination     Admission Date: 5/8/2021  7:13 PM       Under the authority of the Utilization Management Committee, the utilization review process indicated a secondary review on the above patient.  The review outcome is based on review of the medical records, discussions with staff, and applying clinical experience noted on the date of the review.        (X)      Inpatient Status Appropriate - This patient's medical care is consistent with medical management for inpatient care and reasonable inpatient medical practice.      () Observation Status Appropriate - This patient does not meet hospital inpatient criteria and is placed in observation status. If this patient's primary payer is Medicare and was admitted as an inpatient, Condition Code 44 should be used and patient status changed to \"observation\".   () Admission Status NOT Appropriate - This patient's medical care is not consistent with medical management for Inpatient or Observation Status.        () Outpatient Procedure Status Appropriate - Procedure not on Medicare Inpatient list and no complications at the time of this review       RATIONALE FOR DETERMINATION      Brief clinical presentation, information copied from the chart, abbreviated and edited for relevant content:       Daya Ignacio is a 89 year old female with a past medical history of hypertension, depression, mild cognitive impairment, kidney stones, lumbar compression fractures, chronic pain, UTIs who presented with mid back pain. Admitted to OBS initially.  Back pain described as moderate to severe in quality, Worse with movement.    CT abdomen pelvis showed no obstructing ureteral calculi or hydronephrosis but nonobstructing intrarenal calculi.   CT did show chronic severe thoracic and lumbar compression fractures.  Likely pain is originating from her severe compression fractures. Admitted for pain control with Tylenol, lidocaine patch.   PT consulted. "  Paged team to discuss plan for today. Atttending called back and stated patient still needing pain control and waiting for Urine culture results. Not discharging. Advancing to IP.         In summary, the severity of illness, intensity of service provided, expected length of stay and risk for adverse outcome make the care complex, high risk and appropriate for hospital admission.        The information on this document is developed by the utilization review team in order for the business office to ensure compliance.  This only denotes the appropriateness of proper admission status and does not reflect the quality of care rendered.         The definitions of Inpatient Status and Observation Status used in making the determination above are those provided in the CMS Coverage Manual, Chapter 1 and Chapter 6, section 70.4.      Sincerely,      Ofelia Tsang MD   Utilization Review/ Case Management  [facility].

## 2021-05-10 NOTE — PLAN OF CARE
"PRIMARY DIAGNOSIS: \"GENERIC\" NURSING  OUTPATIENT/OBSERVATION GOALS TO BE MET BEFORE DISCHARGE:  ADLs back to baseline: No    Activity and level of assistance: up with assist of 1 gait belt and walker    Pain status: Pain managed with prn Ultram and scheduled tylenol    Return to near baseline physical activity: Yes     Discharge Planner Nurse   Safe discharge environment identified: Yes  Barriers to discharge: Yes pain management, urine cultures pending and mobility.        Entered by: ANDREW BOSWELL 05/10/2021 1:58 PM     .Pt alert and oriented x 4, but forgetful at times and needs reminders. VSS, RA. Saline locked.   Bowel sounds hypoactive, tolerated diet, no nausea.  Frequency and urgency to urinate, was using purewick but is up to BR this afternoon. Contact isolation precautions.Pain to mid back controlled with ice pack application, repositioning, tylenol, ultram, lidocaine patches and diclofenac gel.  CMS baseline numbness/tingling in extremities. PO  cipro for UTI, encouraged more fluids now that IV fluids stopped. Plan is back to her assisted living. Will continue to monitor  "

## 2021-05-10 NOTE — PLAN OF CARE
Pt alert and oriented x 4, but forgetful at times and needs reminders.  Vital signs stable, sat on room air 90%, on 2 lpm nasal cannula, sat is 94-95%.  Bowel sounds hypoactive, tolerated diet, no nausea.  Frequency and urgency to urinate, setting off bed alarm often.  Contact isolation precautions.  Pain to upper and mid back controlled with ice pack application, repositioning, tylenol, ultram, lidocaine patches and diclofenac gel.  CMS , pt stated baseline numbness/tingling in extremities.  Falls risk precautions.  On cipro for UTI, encouraged po fluid intake.  Care plan reviewed with pt.

## 2021-05-11 PROCEDURE — 250N000013 HC RX MED GY IP 250 OP 250 PS 637: Performed by: HOSPITALIST

## 2021-05-11 PROCEDURE — 99233 SBSQ HOSP IP/OBS HIGH 50: CPT | Performed by: INTERNAL MEDICINE

## 2021-05-11 PROCEDURE — 87040 BLOOD CULTURE FOR BACTERIA: CPT | Performed by: INTERNAL MEDICINE

## 2021-05-11 PROCEDURE — 250N000013 HC RX MED GY IP 250 OP 250 PS 637: Performed by: INTERNAL MEDICINE

## 2021-05-11 PROCEDURE — 36415 COLL VENOUS BLD VENIPUNCTURE: CPT | Performed by: INTERNAL MEDICINE

## 2021-05-11 PROCEDURE — 120N000001 HC R&B MED SURG/OB

## 2021-05-11 PROCEDURE — 250N000011 HC RX IP 250 OP 636: Performed by: INTERNAL MEDICINE

## 2021-05-11 RX ORDER — ERTAPENEM 1 G/1
1 INJECTION, POWDER, LYOPHILIZED, FOR SOLUTION INTRAMUSCULAR; INTRAVENOUS EVERY 24 HOURS
Status: DISCONTINUED | OUTPATIENT
Start: 2021-05-11 | End: 2021-05-15

## 2021-05-11 RX ADMIN — ERTAPENEM SODIUM 1 G: 1 INJECTION, POWDER, LYOPHILIZED, FOR SOLUTION INTRAMUSCULAR; INTRAVENOUS at 16:31

## 2021-05-11 RX ADMIN — GABAPENTIN 200 MG: 100 CAPSULE ORAL at 21:01

## 2021-05-11 RX ADMIN — DICLOFENAC SODIUM 2 G: 10 GEL TOPICAL at 09:05

## 2021-05-11 RX ADMIN — TRAMADOL HYDROCHLORIDE 50 MG: 50 TABLET, COATED ORAL at 10:11

## 2021-05-11 RX ADMIN — ACETAMINOPHEN 975 MG: 325 TABLET, FILM COATED ORAL at 13:22

## 2021-05-11 RX ADMIN — LIDOCAINE 2 PATCH: 560 PATCH PERCUTANEOUS; TOPICAL; TRANSDERMAL at 21:01

## 2021-05-11 RX ADMIN — LISINOPRIL 30 MG: 20 TABLET ORAL at 09:09

## 2021-05-11 RX ADMIN — TRAMADOL HYDROCHLORIDE 50 MG: 50 TABLET, COATED ORAL at 16:32

## 2021-05-11 RX ADMIN — GABAPENTIN 200 MG: 100 CAPSULE ORAL at 09:07

## 2021-05-11 RX ADMIN — ISOSORBIDE DINITRATE 10 MG: 10 TABLET ORAL at 09:08

## 2021-05-11 RX ADMIN — CARVEDILOL 12.5 MG: 12.5 TABLET, FILM COATED ORAL at 09:09

## 2021-05-11 RX ADMIN — CIPROFLOXACIN HYDROCHLORIDE 250 MG: 250 TABLET, FILM COATED ORAL at 04:22

## 2021-05-11 RX ADMIN — AMLODIPINE BESYLATE 10 MG: 10 TABLET ORAL at 09:08

## 2021-05-11 RX ADMIN — ACETAMINOPHEN 975 MG: 325 TABLET, FILM COATED ORAL at 09:06

## 2021-05-11 RX ADMIN — ISOSORBIDE DINITRATE 10 MG: 10 TABLET ORAL at 21:01

## 2021-05-11 RX ADMIN — ISOSORBIDE DINITRATE 10 MG: 10 TABLET ORAL at 13:23

## 2021-05-11 RX ADMIN — DICLOFENAC SODIUM 2 G: 10 GEL TOPICAL at 13:21

## 2021-05-11 RX ADMIN — DOCUSATE SODIUM 50 MG AND SENNOSIDES 8.6 MG 1 TABLET: 8.6; 5 TABLET, FILM COATED ORAL at 21:01

## 2021-05-11 RX ADMIN — DICLOFENAC SODIUM 2 G: 10 GEL TOPICAL at 16:34

## 2021-05-11 RX ADMIN — OMEPRAZOLE 20 MG: 20 CAPSULE, DELAYED RELEASE ORAL at 09:07

## 2021-05-11 RX ADMIN — CARVEDILOL 12.5 MG: 12.5 TABLET, FILM COATED ORAL at 17:50

## 2021-05-11 RX ADMIN — ACETAMINOPHEN 975 MG: 325 TABLET, FILM COATED ORAL at 01:02

## 2021-05-11 RX ADMIN — TRAMADOL HYDROCHLORIDE 50 MG: 50 TABLET, COATED ORAL at 04:22

## 2021-05-11 RX ADMIN — SERTRALINE HYDROCHLORIDE 100 MG: 100 TABLET ORAL at 09:07

## 2021-05-11 RX ADMIN — DICLOFENAC SODIUM 2 G: 10 GEL TOPICAL at 21:02

## 2021-05-11 ASSESSMENT — ACTIVITIES OF DAILY LIVING (ADL)
TOILETING_ISSUES: NO
ADLS_ACUITY_SCORE: 18
WALKING_OR_CLIMBING_STAIRS: AMBULATION DIFFICULTY, REQUIRES EQUIPMENT
ADLS_ACUITY_SCORE: 19
EQUIPMENT_CURRENTLY_USED_AT_HOME: WALKER, ROLLING
WEAR_GLASSES_OR_BLIND: YES
DIFFICULTY_EATING/SWALLOWING: NO
HEARING_DIFFICULTY_OR_DEAF: NO
DIFFICULTY_COMMUNICATING: NO
FALL_HISTORY_WITHIN_LAST_SIX_MONTHS: NO
ADLS_ACUITY_SCORE: 18
ADLS_ACUITY_SCORE: 18
DOING_ERRANDS_INDEPENDENTLY_DIFFICULTY: NO
VISION_MANAGEMENT: GLASSES
ADLS_ACUITY_SCORE: 18
DRESSING/BATHING_DIFFICULTY: NO
CONCENTRATING,_REMEMBERING_OR_MAKING_DECISIONS_DIFFICULTY: NO
ADLS_ACUITY_SCORE: 19
WALKING_OR_CLIMBING_STAIRS_DIFFICULTY: YES

## 2021-05-11 NOTE — PROGRESS NOTES
[facility]    Hospitalist Progress Note      Assessment & Plan   Daya Ignacio is a 89 year old female with a past medical history of hypertension, depression, mild cognitive impairment, kidney stones, lumbar compression fractures, chronic pain, UTIs who presents with mid back pain.      #Mid-back pain-Patient continues to be afebrile, nontachycardic, nontachypneic with a normal white count. CT abdomen pelvis showed no obstructing ureteral calculi or hydronephrosis but nonobstructing intrarenal calculi.   CT did show chronic severe thoracic and lumbar compression fractures.  Given that this pain is worse with movement with CT scan not showing any evidence of obstructing nephrolithiasis, suspicion for musculoskeletal strain in the setting of chronic back pain from her compression fractures.    -Scheduled Tylenol, lidocaine patch.  Topicals.  Tramadol to 50 mg every 6 hours as needed.  PT consulted. Consulted orthotics given compression fractures.  -Patient son reports that she did have some increased left flank pain.  Urine culture now positive.  Also appears to have developed a rash on the flank area.  Question of shingles.  Will request ID consult      #History of recurrent UTI, abnormal UA: UA is again abnormal with possible indication of infection.  She is likely chronically going to have abnormal UAs and is colonized.  She denies any dysuria or increased frequency of urination.  She was started on ciprofloxacin   --Urine culture now growing ESBL organism.  Ciprofloxacin not effective for that.  If this is a true infection then she will need Carbapenem for treatment given her multiple allergies.  ID to evaluate     #Possible radiologic early partial SBO vs ileus: There was mild dilation of the small bowel noted which can be seen due to ileus versus early partial small bowel obstruction.  She denies any nausea vomiting.  She had normal BM prior to admission. She states she is passing gas.   Clinically this does not seem like a SBO or ileus  --Monitor clinically     #HTN: Continue home coreg, lisinopril and imdur.  Resumed home amlodipine    #Depression: Continue home sertraline  #Known hx of left Adnexal cyst     DVT Prophylaxis: Pneumatic Compression Devices  Code Status: DNR, DNI  Dispo: Continue cares pending further evaluation of the UTI and the rash    -I discussed with her son, Gael at the bedside.     Roberto Diaz MD      Interval History   Patient continues to complain of mid to lower back pain.  She describes the pain that initially brought her into the hospital is more in the flank, although the pain is more in the midline in the back now.  Tramadol is helping, although still having pain.  No urinary symptoms.  No chest pain or shortness of breath.  She has developed a rash on the left flank area, with some erythema and blister formation.    -Data reviewed today: I reviewed all new labs and imaging results over the last 24 hours.     Physical Exam   Temp: 98.1  F (36.7  C) Temp src: Temporal BP: 116/48 Pulse: 69   Resp: 20 SpO2: 95 % O2 Device: None (Room air)    Vitals:    05/08/21 2351   Weight: 58.2 kg (128 lb 6.4 oz)     Vital Signs with Ranges  Temp:  [97.7  F (36.5  C)-98.1  F (36.7  C)] 98.1  F (36.7  C)  Pulse:  [62-69] 69  Resp:  [18-20] 20  BP: (116-183)/(48-76) 116/48  SpO2:  [94 %-95 %] 95 %  I/O last 3 completed shifts:  In: 360 [P.O.:360]  Out: -     Constitutional: Elderly, lying in bed asleep.  She is arousable.  HEENT: Normocephalic. MMM, No elevation of JVD noted.   Respiratory: Nl WOB, Clear bilaterally, No wheezes or crackles  Cardiovascular: Regular, no murmur  GI: BS+, NT, ND  Skin/Integumen: WWP, no rash. No edema, patient has a demarcated area of erythema on the left flank with some early appearing blisters.  No clear vesicular lesions.  MSK: Patient able to turn in bed independently.  No point tenderness through her spine.    Neuro: Moves all extremities. No  tremor. A&Ox3.    Medications     sodium chloride 100 mL/hr at 05/10/21 0817       acetaminophen  975 mg Oral TID     amLODIPine  10 mg Oral Daily     carvedilol  12.5 mg Oral BID w/meals     diclofenac  2 g Topical 4x Daily     ertapenem (INVanz) IV  1 g Intravenous Q24H     gabapentin  200 mg Oral BID     isosorbide dinitrate  10 mg Oral TID     lidocaine  2 patch Transdermal Q24h    And     lidocaine   Transdermal Q8H     lisinopril  30 mg Oral Daily     omeprazole  20 mg Oral Daily     senna-docusate  1 tablet Oral At Bedtime     sertraline  100 mg Oral Daily       Data   Recent Labs   Lab 05/09/21  0557 05/08/21  1939   WBC 8.0 8.3   HGB 10.2* 10.3*   MCV 97 96    193    134   POTASSIUM 4.7 3.8   CHLORIDE 106 104   CO2 28 29   BUN 26 31*   CR 0.80 0.87   ANIONGAP 1* 1*   DAVE 9.0 9.0   GLC 93 105*       No results found for this or any previous visit (from the past 24 hour(s)).

## 2021-05-11 NOTE — CONSULTS
Consult Date: 05/11/2021    INFECTIOUS DISEASE CONSULTATION    LOCATION:  Room 649, [facility]    REFERRING PHYSICIAN:  Roberto Diaz MD    IMPRESSION:     1.  A 89-year-old female, well known to me from multiple prior admissions with complicated infection and medical history, now admitted with back pain, left flank discomfort, maybe slight cognition changes and possible small bowel obstruction, not perceived to be infection related, but historically has looked similar with prior UTI- related infections including the pain, so not entirely excluded that this is the actual clinical event here.  2.  ESBL positive urine culture with grossly abnormal urine, colonized chronically, but also with major recurrent infection, so hard to exclude completely that this is true infection.  3.  History of chronically colonized urine, prior stones including obstruction and multiple prior major infections including candidemia and bacteremias in the past.  4.  Prior C. diff, but not since 2019.  5.  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  6.  Bilateral total hip arthroplasties as a threatened location, but no prior infections.  7.  Mild chronic renal dysfunction.  8.  LISTED PENICILLIN AND NITROFURANTOIN ALLERGIES.  WE HAVE GIVEN HER SULFA BEFORE WHEN FORCED WHICH LISTED AS ALLERGIC, BUT THIS IS A FAMILY HISTORY OF THAT, NOT A PERSONAL HISTORY.    RECOMMENDATIONS:     1.  Difficult to know what to make of this; however, adding everything together, certainly would treat at this point.  We will start ertapenem and plan on at least a short course.  2.  Get blood cultures before we start ertapenem.  Historically, in very similar circumstances, she has actually been bacteremic when this is occurring, has not had any effective agents against the organism yet, so would still likely find the organism on the Cipro she is on.  Obviously, discontinue Cipro.  3.  Previously addressed the overall picture.  There has  been a large amount of interest in trying to do prophylactic antibiotics, but they have no role to play here whatsoever.  She has highly resistant organism, so despite getting recurrent sepsis and major infections, prophylaxis is contraindicated.  In addition, the problematic situation here is whether to treat her asymptomatic bacteriuria given that she has had multiple recurrent major infections.  There is a desire to, but probably best not to treat her when it is truly asymptomatic bacteriuria; that does not mean, of course, here that asymptomatic bacteriuria is a benign condition.  It certainly is a threat including her hips, etc.    HISTORY:  This 89-year-old female is seen in consultation.  She is very well known to me from prior admissions.  She currently was admitted with maybe a slight bit of more systemic issues, but primarily with left back and flank discomfort.  Imaging did not show any obvious obstructing stones, evidence of pylori clear infection.  She has not had any fevers, chills, or illness symptoms.  The pain is ongoing, felt to be musculoskeletal although, of note, has kidney stones and long complicated infection history.  In addition, she has currently grossly abnormal urine and urine culture is growing an ESBL organism.    History of multiple recurrent UTIs, including sepsis and bacteremia and candidemia in the past.  She has some stones, but not obstructing recently, has not had any UTIs since late last year that have been treated, did not have significant fevers or chills nor lower urinary tract symptoms when she came in.    PAST MEDICAL HISTORY:  Long history of recurrent UTIs as noted, history of VRE and ESBL, prior C. diff, but not since 2019, bilateral total hip arthroplasties, never with any infection issues.    ALLERGIES:  NITROFURANTOIN AND PENICILLIN LISTED.  CHRONICALLY IS SULFA, BUT WE HAVE GIVEN IT TO HER IN THE PAST AND IT IS A FAMILY HISTORY, NOT A PERSONAL HISTORY OF  THIS.    SOCIAL AND FAMILY HISTORY:  No recent travel exposures.  The known resistant pathogens.    REVIEW OF SYSTEMS:  Largely as above, ongoing flank and back discomfort really has not improved much, some tenderness in that same area.    PHYSICAL EXAMINATION:    GENERAL:  The patient appears her stated age, seems slightly off from what I have known historically in her.  There has been some progressive cognitive dysfunction apparently.  She is afebrile, does not look toxic or ill.  HEENT:  No obvious lesions.  NECK:  Supple and nontender, no meningismus.  HEART AND LUNGS:  Unremarkable.  ABDOMEN:  Soft, nontender.  EXTREMITIES:  No significant rashes or skin lesions.  Some tenderness in the mid thoracic back and into the left flank area.  No palpable abnormality.    LABORATORY DATA:  Labs fairly unremarkable.  CT scan without clear active infection issue.  Renal function relatively normal.  Urine grossly abnormal and urine culture with Aerococcus urinae and ESBL E. coli.    Thank you very much for consultation.  I will follow the patient with you.    Juan Manuel Camejo MD        D: 2021   T: 2021   MT: MASON    Name:     MAGALY MCDANIELS  MRN:      [id number]        Account:      [id number]   :      10/11/1930           Consult Date: 2021     Document: [id number]

## 2021-05-11 NOTE — PLAN OF CARE
AM Shift  A/Ox4. Pleasantly forgetful at times. SBA. Abdominal corset in room. BP elevated this AM at 183/75. Gave Coreg, Isordil, & Norvasc x2. Recheck 116/48. Tolerated regular diet. Voiding adequately. Baseline numbness to BLE. Pain in back managed with tramadol, tylenol, & voltaren gel. Skin to L flank red/firm, outlined with marker. Has not advanced past borders. Urine culture positive for Aerococcus Urinae & E Coli. ID consulted. Son Gael at bedside. Plan is home @ discharge.

## 2021-05-11 NOTE — PROGRESS NOTES
Pt is a lert and oriented,forgetiful, vss, lung sounds clear, up with assist of one walker and gait. +ve bowel sounds. Passing flatus. Pain controlled  With prn Tramadol, Volrene gel.Baseline numbness to carlos. LE. Continues on abx. Cipro. Reddiness/ swollen/Blisters  to left flank side.Voiding in the bathroom. Will continue to monitor.

## 2021-05-11 NOTE — CONSULTS
ID consult dictaTED imp 1 89 YO FEMALE WELL KNOWN TO ME(SEE DICTATES NOTE), NOW BACK/l FLANK PAIN, ct WO CLEAR INFECTION uc esbl no BC done    REc BC now, then erta, very complex but add all factors at least some tx

## 2021-05-11 NOTE — PROGRESS NOTES
Patient was fit with a corset by Caroline Delgado board eligible Prosthetist and Orthotist yesterday.  Verbal, written instructions given as well as contact information. Please note I talked to Dr. Diaz and it was decided that a corset is appropriate for the current condition.   Arnold RAMAN.

## 2021-05-12 ENCOUNTER — APPOINTMENT (OUTPATIENT)
Dept: PHYSICAL THERAPY | Facility: CLINIC | Age: 86
DRG: 690 | End: 2021-05-12
Payer: COMMERCIAL

## 2021-05-12 LAB
ANION GAP SERPL CALCULATED.3IONS-SCNC: 4 MMOL/L (ref 3–14)
BUN SERPL-MCNC: 17 MG/DL (ref 7–30)
CALCIUM SERPL-MCNC: 9.1 MG/DL (ref 8.5–10.1)
CHLORIDE SERPL-SCNC: 105 MMOL/L (ref 94–109)
CO2 SERPL-SCNC: 27 MMOL/L (ref 20–32)
CREAT SERPL-MCNC: 0.63 MG/DL (ref 0.52–1.04)
ERYTHROCYTE [DISTWIDTH] IN BLOOD BY AUTOMATED COUNT: 13.4 % (ref 10–15)
GFR SERPL CREATININE-BSD FRML MDRD: 79 ML/MIN/{1.73_M2}
GLUCOSE SERPL-MCNC: 93 MG/DL (ref 70–99)
HCT VFR BLD AUTO: 31.7 % (ref 35–47)
HGB BLD-MCNC: 10 G/DL (ref 11.7–15.7)
MCH RBC QN AUTO: 30.7 PG (ref 26.5–33)
MCHC RBC AUTO-ENTMCNC: 31.5 G/DL (ref 31.5–36.5)
MCV RBC AUTO: 97 FL (ref 78–100)
PLATELET # BLD AUTO: 205 10E9/L (ref 150–450)
POTASSIUM SERPL-SCNC: 4 MMOL/L (ref 3.4–5.3)
RBC # BLD AUTO: 3.26 10E12/L (ref 3.8–5.2)
SODIUM SERPL-SCNC: 136 MMOL/L (ref 133–144)
WBC # BLD AUTO: 7.3 10E9/L (ref 4–11)

## 2021-05-12 PROCEDURE — 36415 COLL VENOUS BLD VENIPUNCTURE: CPT | Performed by: INTERNAL MEDICINE

## 2021-05-12 PROCEDURE — 99233 SBSQ HOSP IP/OBS HIGH 50: CPT | Performed by: INTERNAL MEDICINE

## 2021-05-12 PROCEDURE — G0463 HOSPITAL OUTPT CLINIC VISIT: HCPCS

## 2021-05-12 PROCEDURE — 97530 THERAPEUTIC ACTIVITIES: CPT | Mod: GP | Performed by: PHYSICAL THERAPIST

## 2021-05-12 PROCEDURE — 250N000011 HC RX IP 250 OP 636: Performed by: HOSPITALIST

## 2021-05-12 PROCEDURE — 250N000011 HC RX IP 250 OP 636: Performed by: INTERNAL MEDICINE

## 2021-05-12 PROCEDURE — 80048 BASIC METABOLIC PNL TOTAL CA: CPT | Performed by: INTERNAL MEDICINE

## 2021-05-12 PROCEDURE — 250N000013 HC RX MED GY IP 250 OP 250 PS 637: Performed by: HOSPITALIST

## 2021-05-12 PROCEDURE — 120N000001 HC R&B MED SURG/OB

## 2021-05-12 PROCEDURE — 250N000013 HC RX MED GY IP 250 OP 250 PS 637: Performed by: INTERNAL MEDICINE

## 2021-05-12 PROCEDURE — 85027 COMPLETE CBC AUTOMATED: CPT | Performed by: INTERNAL MEDICINE

## 2021-05-12 RX ORDER — CLONIDINE HYDROCHLORIDE 0.1 MG/1
0.05 TABLET ORAL 2 TIMES DAILY
Status: DISCONTINUED | OUTPATIENT
Start: 2021-05-12 | End: 2021-05-22 | Stop reason: HOSPADM

## 2021-05-12 RX ADMIN — TRAMADOL HYDROCHLORIDE 50 MG: 50 TABLET, COATED ORAL at 17:47

## 2021-05-12 RX ADMIN — CLONIDINE HYDROCHLORIDE 0.05 MG: 0.1 TABLET ORAL at 21:16

## 2021-05-12 RX ADMIN — ACETAMINOPHEN 975 MG: 325 TABLET, FILM COATED ORAL at 08:58

## 2021-05-12 RX ADMIN — TRAMADOL HYDROCHLORIDE 50 MG: 50 TABLET, COATED ORAL at 01:30

## 2021-05-12 RX ADMIN — LISINOPRIL 30 MG: 20 TABLET ORAL at 08:58

## 2021-05-12 RX ADMIN — LIDOCAINE 2 PATCH: 560 PATCH PERCUTANEOUS; TOPICAL; TRANSDERMAL at 21:17

## 2021-05-12 RX ADMIN — DICLOFENAC SODIUM 2 G: 10 GEL TOPICAL at 15:44

## 2021-05-12 RX ADMIN — ACETAMINOPHEN 975 MG: 325 TABLET, FILM COATED ORAL at 13:11

## 2021-05-12 RX ADMIN — DICLOFENAC SODIUM 2 G: 10 GEL TOPICAL at 09:00

## 2021-05-12 RX ADMIN — GABAPENTIN 200 MG: 100 CAPSULE ORAL at 08:59

## 2021-05-12 RX ADMIN — CARVEDILOL 12.5 MG: 12.5 TABLET, FILM COATED ORAL at 17:48

## 2021-05-12 RX ADMIN — ONDANSETRON 4 MG: 2 INJECTION INTRAMUSCULAR; INTRAVENOUS at 01:30

## 2021-05-12 RX ADMIN — SERTRALINE HYDROCHLORIDE 100 MG: 100 TABLET ORAL at 08:59

## 2021-05-12 RX ADMIN — DOCUSATE SODIUM 50 MG AND SENNOSIDES 8.6 MG 1 TABLET: 8.6; 5 TABLET, FILM COATED ORAL at 21:16

## 2021-05-12 RX ADMIN — DICLOFENAC SODIUM 2 G: 10 GEL TOPICAL at 13:09

## 2021-05-12 RX ADMIN — ISOSORBIDE DINITRATE 10 MG: 10 TABLET ORAL at 08:59

## 2021-05-12 RX ADMIN — AMLODIPINE BESYLATE 10 MG: 10 TABLET ORAL at 08:59

## 2021-05-12 RX ADMIN — ERTAPENEM SODIUM 1 G: 1 INJECTION, POWDER, LYOPHILIZED, FOR SOLUTION INTRAMUSCULAR; INTRAVENOUS at 15:44

## 2021-05-12 RX ADMIN — ISOSORBIDE DINITRATE 10 MG: 10 TABLET ORAL at 13:11

## 2021-05-12 RX ADMIN — CARVEDILOL 12.5 MG: 12.5 TABLET, FILM COATED ORAL at 08:59

## 2021-05-12 RX ADMIN — GABAPENTIN 200 MG: 100 CAPSULE ORAL at 21:16

## 2021-05-12 RX ADMIN — DICLOFENAC SODIUM 2 G: 10 GEL TOPICAL at 21:17

## 2021-05-12 RX ADMIN — ACETAMINOPHEN 975 MG: 325 TABLET, FILM COATED ORAL at 01:20

## 2021-05-12 RX ADMIN — OMEPRAZOLE 20 MG: 20 CAPSULE, DELAYED RELEASE ORAL at 08:59

## 2021-05-12 RX ADMIN — CLONIDINE HYDROCHLORIDE 0.05 MG: 0.1 TABLET ORAL at 09:55

## 2021-05-12 RX ADMIN — TRAMADOL HYDROCHLORIDE 50 MG: 50 TABLET, COATED ORAL at 09:55

## 2021-05-12 ASSESSMENT — ACTIVITIES OF DAILY LIVING (ADL)
ADLS_ACUITY_SCORE: 19

## 2021-05-12 NOTE — PLAN OF CARE
Physical Therapy Discharge Summary    Reason for therapy discharge:    All goals and outcomes met, no further needs identified.    Progress towards therapy goal(s). See goals on Care Plan in [facility] electronic health record for goal details.  Goals met    Therapy recommendation(s):    No further therapy is recommended.

## 2021-05-12 NOTE — CONSULTS
[facility] Nurse Inpatient Wound Assessment   Reason for consultation: Evaluate and treat  Left back wounds    Assessment  Left back wounds due to possible Trauma (patient denies falling or bumping into this area) vs unknown etiology  Status: initial assessment  Larger ruptured blister with several smaller blisters and erythema surrounding  Treatment Plan  Left back wounds: Every 3 days   1. Cleanse with wound cleanser and dry  2. Apply Mepilex sacral  3. Do not lift between changes to assess as patient has fragile skin   Orders Written  Recommended provider order: None, at this time  WO Nurse follow-up plan:weekly  Nursing to notify the Provider(s) and re-consult the WOC Nurse if wound(s) deteriorates or new skin concern.    Patient History  According to provider note(s):  Daya Ignacio is a 89 year old female with a past medical history of hypertension, depression, mild cognitive impairment, kidney stones, lumbar compression fractures, chronic pain, UTIs who presents with mid back pain.     Objective Data    Active Diet Order  Orders Placed This Encounter      Regular Diet Adult      Output:   I/O last 3 completed shifts:  In: 650 [P.O.:650]  Out: -     Risk Assessment:   Sensory Perception: 3-->slightly limited  Moisture: 3-->occasionally moist  Activity: 3-->walks occasionally  Mobility: 3-->slightly limited  Nutrition: 3-->adequate  Friction and Shear: 3-->no apparent problem  Manuel Score: 18                          Labs:   Recent Labs   Lab 05/12/21  0751   HGB 10.0*   WBC 7.3       Physical Exam  Areas of skin assessed: focused Left back    Wound Location:  Left back  Date of last photo 5/12/21  Wound History: Patient denies falling or bumping into area at all.        Wound Base: 6x3cm agranular ruptured blister with several smaller ruptured and intact serous blisters surrounding     Palpation of the wound bed: normal      Drainage: small     Description of drainage: serous     Measurements (length x width x  depth, in cm) see above      Tunneling N/A     Undermining N/A  Periwound skin: erythema- blanchable      Color: pink      Temperature: normal   Odor: none  Pain: mild    Interventions  Visual inspection and assessment completed   Wound Care Rationale Improve absorptive capacity and Promote moist wound healing without tissue dehydration   Wound Care: done per plan of care  Supplies: gathered and placed at the bedside  Current off-loading measures: Pillows under calves and Pillows  Current support surface: Standard  Atmos Air mattress  Education provided to: plan of care  Discussed plan of care with Patient and Nurse    Carlos Tillman RN CWOCN

## 2021-05-12 NOTE — PLAN OF CARE
Afeb, BPs still running higher, catapres added to her med regimen. Back pain improved from admission per son but still rated high in spite of tramadol, tylenol, votarin gel and lidocaine patches at night. Up with assist of 1 and walker to BR and to the chair for meals. PT did walk her in the medley with the corset on and she passed PT. IV abx continues daily. WO nurse consulted for treatment plan for the rash/blister area on her back and he applied mepalex to be changed every 3 days. Discharge plan still pending.

## 2021-05-12 NOTE — PROGRESS NOTES
[facility]  Hospitalist Progress Note    05/12/2021      Assessment & Plan   Daya Ignacio is a 89 year old female with a past medical history of hypertension, depression, mild cognitive impairment, kidney stones, lumbar compression fractures, chronic pain, UTIs who presents with mid back pain.       #History of recurrent UTI, abnormal UA: UA is again abnormal with possible indication of infection.  She is likely chronically going to have abnormal UAs and is colonized.  She denies any dysuria or increased frequency of urination.  She was started on ciprofloxacin   --Urine culture now growing ESBL organism.  Ciprofloxacin not effective for that. Appreciate ID consult. Plan to treat this episode   --On Ertapenem now      #Mid-back pain-Patient continues to be afebrile, nontachycardic, nontachypneic with a normal white count. CT abdomen pelvis showed no obstructing ureteral calculi or hydronephrosis but nonobstructing intrarenal calculi.   CT did show chronic severe thoracic and lumbar compression fractures.  Given that this pain is worse with movement with CT scan not showing any evidence of obstructing nephrolithiasis, suspicion for musculoskeletal strain in the setting of chronic back pain from her compression fractures.    -Scheduled Tylenol, lidocaine patch.  Topicals.  Tramadol to 50 mg every 6 hours as needed.  PT consulted. Consulted orthotics given compression fractures.  -Patient son reports that she did have some increased left flank pain.  Urine culture now positive.  Also appears to have developed a rash on the flank area.  Question of shingles.  Will request ID consult     #Flank rash. Erythematous area on left flank noticed during this hospital stay. Appears to have a blister that ruptured.   -- No obvious vesicles noted to suggest zoster, but monitor closely   -- [facility] nurse consult   -- Does not appear like cellulitis although should be covered by ertapenem     #Possible  radiologic early partial SBO vs ileus: There was mild dilation of the small bowel noted which can be seen due to ileus versus early partial small bowel obstruction.  She denies any nausea vomiting.  She had normal BM prior to admission. She states she is passing gas.  Clinically this does not seem like a SBO or ileus  --Monitor clinically     #HTN: Continue home coreg, lisinopril and imdur.  Resumed home amlodipine    #Depression: Continue home sertraline  #Known hx of left Adnexal cyst     DVT Prophylaxis: Pneumatic Compression Devices  Code Status: DNR/DNI  Dispo: Continue cares pending further evaluation of the UTI and the rash    -I discussed with her son, Gael at the bedside.     Roberto Diaz MD      Interval History   Patient's pain level is better, she is ambulating more, Tramadol is helping, No chest pain or shortness of breath. No other complaints voiced     -Data reviewed today: I reviewed all new labs and imaging results over the last 24 hours.     Physical Exam   Temp: 96.5  F (35.8  C) Temp src: Temporal BP: 118/52 Pulse: 66   Resp: 14 SpO2: 93 % O2 Device: None (Room air)    Vitals:    05/08/21 2351   Weight: 58.2 kg (128 lb 6.4 oz)     Vital Signs with Ranges  Temp:  [96.5  F (35.8  C)-98  F (36.7  C)] 96.5  F (35.8  C)  Pulse:  [61-71] 66  Resp:  [14-16] 14  BP: (118-164)/(47-69) 118/52  SpO2:  [92 %-95 %] 93 %  I/O last 3 completed shifts:  In: 1370 [P.O.:1370]  Out: 100 [Urine:100]    Constitutional: Elderly, awake   HEENT: Normocephalic. MMM, No elevation of JVD noted.   Respiratory: Nl WOB, Clear bilaterally, No wheezes or crackles  Cardiovascular: Regular, no murmur  GI: BS+, NT, ND  Skin/Integumen: WWP, no rash. No edema, patient has a demarcated area of erythema on the left flank with ruptured blisters.  No clear vesicular lesions.  MSK: Patient able to turn in bed independently.  No point tenderness through her spine.    Neuro: Moves all extremities. No tremor. A&Ox3.    Medications      sodium chloride 100 mL/hr at 05/10/21 0817       acetaminophen  975 mg Oral TID     amLODIPine  10 mg Oral Daily     carvedilol  12.5 mg Oral BID w/meals     cloNIDine  0.05 mg Oral BID     diclofenac  2 g Topical 4x Daily     ertapenem (INVanz) IV  1 g Intravenous Q24H     gabapentin  200 mg Oral BID     isosorbide dinitrate  10 mg Oral TID     lidocaine  2 patch Transdermal Q24h    And     lidocaine   Transdermal Q8H     lisinopril  30 mg Oral Daily     omeprazole  20 mg Oral Daily     senna-docusate  1 tablet Oral At Bedtime     sertraline  100 mg Oral Daily       Data   Recent Labs   Lab 05/12/21  0751 05/09/21  0557 05/08/21  1939   WBC 7.3 8.0 8.3   HGB 10.0* 10.2* 10.3*   MCV 97 97 96    196 193    135 134   POTASSIUM 4.0 4.7 3.8   CHLORIDE 105 106 104   CO2 27 28 29   BUN 17 26 31*   CR 0.63 0.80 0.87   ANIONGAP 4 1* 1*   DAVE 9.1 9.0 9.0   GLC 93 93 105*       No results found for this or any previous visit (from the past 24 hour(s)).

## 2021-05-12 NOTE — PROGRESS NOTES
[facility]  Infectious Disease Progress Note          Assessment and Plan:   IMPRESSION:     1.  A 89-year-old female, well known to me from multiple prior admissions with complicated infection and medical history, now admitted with back pain, left flank discomfort, maybe slight cognition changes and possible small bowel obstruction, not perceived to be infection related, but historically has looked similar with prior UTI- related infections including the pain, so not entirely excluded that this is the actual clinical event here.  2.  ESBL positive urine culture with grossly abnormal urine, colonized chronically, but also with major recurrent infection, so hard to exclude completely that this is true infection.  3.  History of chronically colonized urine, prior stones including obstruction and multiple prior major infections including candidemia and bacteremias in the past.  4.  Prior C. diff, but not since 2019.  5.  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  6.  Bilateral total hip arthroplasties as a threatened location, but no prior infections.  7.  Mild chronic renal dysfunction.  8.  LISTED PENICILLIN AND NITROFURANTOIN ALLERGIES.  WE HAVE GIVEN HER SULFA BEFORE WHEN FORCED WHICH LISTED AS ALLERGIC, BUT THIS IS A FAMILY HISTORY OF THAT, NOT A PERSONAL HISTORY.     RECOMMENDATIONS:     1.  Difficult to know if infection issue however, adding everything together, certainly would treat at this point.  We will start ertapenem and plan on at least a short course.  2.  Get blood cultures before we start ertapenem.  Historically, in very similar circumstances, she has actually been bacteremic when this is occurring, has not had any effective agents against the organism yet, so would still likely find the organism on the Cipro she is on.  Obviously, discontinue Cipro.  3.  Previously addressed the overall picture.  There has been a large amount of interest in trying to do prophylactic  antibiotics, but they have no role to play here whatsoever.  She has highly resistant organisms, so despite getting recurrent sepsis and major infections, prophylaxis is not indicated and counterproductive.  In addition, the problematic long term situation here is whether to treat her asymptomatic bacteriuria given that she has had multiple recurrent major infections.  Usual practice is best not to treat her when it is truly asymptomatic bacteriuria; that does not mean, of course, here that asymptomatic bacteriuria is a benign condition.  It certainly is a threat of bacteremia/ hips, etc.           Interval History:   no new complaints and doing well except pain; no cp, sob, n/v/d, or abd pain.              Medications:       acetaminophen  975 mg Oral TID     amLODIPine  10 mg Oral Daily     carvedilol  12.5 mg Oral BID w/meals     cloNIDine  0.05 mg Oral BID     diclofenac  2 g Topical 4x Daily     ertapenem (INVanz) IV  1 g Intravenous Q24H     gabapentin  200 mg Oral BID     isosorbide dinitrate  10 mg Oral TID     lidocaine  2 patch Transdermal Q24h    And     lidocaine   Transdermal Q8H     lisinopril  30 mg Oral Daily     omeprazole  20 mg Oral Daily     senna-docusate  1 tablet Oral At Bedtime     sertraline  100 mg Oral Daily                  Physical Exam:   Blood pressure (!) 146/66, pulse 61, temperature 97  F (36.1  C), temperature source Temporal, resp. rate 16, weight 58.2 kg (128 lb 6.4 oz), SpO2 92 %, not currently breastfeeding.  Wt Readings from Last 2 Encounters:   05/08/21 58.2 kg (128 lb 6.4 oz)   11/18/20 57.6 kg (127 lb)     Vital Signs with Ranges  Temp:  [97  F (36.1  C)-98  F (36.7  C)] 97  F (36.1  C)  Pulse:  [61-71] 61  Resp:  [14-18] 16  BP: (116-164)/(47-69) 146/66  SpO2:  [92 %-95 %] 92 %    Constitutional: Awake, alert, cooperative, no apparent distress   Lungs: Clear to auscultation bilaterally, no crackles or wheezing   Cardiovascular: Regular rate and rhythm, normal S1 and S2,  and no murmur noted   Abdomen: Normal bowel sounds, soft, non-distended, non-tender   Skin: No rashes, no cyanosis, no edema   Other:           Data:   All microbiology laboratory data reviewed.  Recent Labs   Lab Test 05/12/21  0751 05/09/21  0557 05/08/21 1939   WBC 7.3 8.0 8.3   HGB 10.0* 10.2* 10.3*   HCT 31.7* 31.9* 31.1*   MCV 97 97 96    196 193     Recent Labs   Lab Test 05/12/21  0751 05/09/21  0557 05/08/21  1939   CR 0.63 0.80 0.87     No lab results found.  Recent Labs   Lab Test 05/11/21  1613 05/11/21  1607 05/09/21  0120 11/02/20  0819 11/02/20  0817 10/31/20  1130 10/31/20  0941 10/31/20  0938 05/09/20  0944   CULT No growth after 18 hours No growth after 18 hours >100,000 colonies/mL  Aerococcus urinae  Identification obtained by MALDI-TOF mass spectrometry research use only database. Test   characteristics determined and verified by the Infectious Diseases Diagnostic Laboratory   ([facility]) [place], [place].  Susceptibility testing not routinely done  *  >100,000 colonies/mL  Escherichia coli  Possible ESBL (extended spectrum Beta lactmase) . Await confirmation. ESBL   producers are resistant to all cephalosporins (including 3rd generation). ESBL producers   usually are susceptible to amikacin, imipenem and meropenem.  Additional susceptibilities in progress  5/12/21  * No growth No growth <10,000 colonies/mL  Proteus mirabilis  *  <10,000 colonies/mL  Enterococcus faecium  *  <10,000 colonies/mL  Strain 2  Enterococcus faecium  *  <10,000 colonies/mL  Candida glabrata complex  Susceptibility testing not routinely done  *  ID requested by Dr. Camejo on 11.2.20 at 1311. bw Cultured on the 1st day of incubation:  Candida glabrata complex  *  Critical Value/Significant Value, preliminary result only, called to and read back by  Elke Peña RN at 0155 11.2.20. amd   Cultured on the 1st day of incubation:  Candida glabrata complex  *  Critical Value/Significant Value, preliminary result  only, called to and read back by  Elke Peña RN 11/2/20 @ 9994 TF    Susceptibility testing done on previous specimen No growth

## 2021-05-12 NOTE — PLAN OF CARE
Vitals: BP (!) 159/47 (BP Location: Left arm)   Pulse 71   Temp 98  F (36.7  C) (Temporal)   Resp 16   Wt 58.2 kg (128 lb 6.4 oz)   SpO2 92%   BMI 25.08 kg/m    Pain: Reports ongoing back pain/leg pain. PRN tylenol and tramadol given.  Neuro: A&Ox4. Able to make needs known   Cardiac: Denies CP  Resp: Denies SOB.  GI/: Frequent urination.  Skin: Red rash w/ blisters present on back. Perimeter marked by prev shift.  Mobility: x1 assist  Plan: Continue POC.

## 2021-05-12 NOTE — PLAN OF CARE
Pt alert and oriented x4, forgetful at times, needed reminders.  Lungs clear.  Bowel sounds hypoactive.  Frequency and urgency with urination, assist of 1 to bedside commode with walker and  gait belt.  Pain to mid and lower back treated with ultram, tylenol and diclofenac rub.Pt has outlined redness ,blister spot to left flank.Sween cream applied to same, encouraged not to apply ice pack to area.Same open to air.  Seen by ID CONSULT, abx changed to iv  Invanz, cipro discontinued..Urine culture positive.  Falls risk and contact isolation precautions.

## 2021-05-13 LAB
BACTERIA SPEC CULT: ABNORMAL
BACTERIA SPEC CULT: ABNORMAL
Lab: ABNORMAL
SPECIMEN SOURCE: ABNORMAL

## 2021-05-13 PROCEDURE — 258N000003 HC RX IP 258 OP 636: Performed by: HOSPITALIST

## 2021-05-13 PROCEDURE — 120N000001 HC R&B MED SURG/OB

## 2021-05-13 PROCEDURE — 250N000011 HC RX IP 250 OP 636: Performed by: INTERNAL MEDICINE

## 2021-05-13 PROCEDURE — 250N000013 HC RX MED GY IP 250 OP 250 PS 637: Performed by: HOSPITALIST

## 2021-05-13 PROCEDURE — 99233 SBSQ HOSP IP/OBS HIGH 50: CPT | Performed by: HOSPITALIST

## 2021-05-13 RX ADMIN — SERTRALINE HYDROCHLORIDE 100 MG: 100 TABLET ORAL at 09:21

## 2021-05-13 RX ADMIN — ISOSORBIDE DINITRATE 10 MG: 10 TABLET ORAL at 09:21

## 2021-05-13 RX ADMIN — Medication 1 MG: at 00:57

## 2021-05-13 RX ADMIN — CARVEDILOL 12.5 MG: 12.5 TABLET, FILM COATED ORAL at 09:20

## 2021-05-13 RX ADMIN — GABAPENTIN 200 MG: 100 CAPSULE ORAL at 21:12

## 2021-05-13 RX ADMIN — DICLOFENAC SODIUM 2 G: 10 GEL TOPICAL at 12:10

## 2021-05-13 RX ADMIN — ISOSORBIDE DINITRATE 10 MG: 10 TABLET ORAL at 13:29

## 2021-05-13 RX ADMIN — LISINOPRIL 30 MG: 20 TABLET ORAL at 09:17

## 2021-05-13 RX ADMIN — SODIUM CHLORIDE, PRESERVATIVE FREE: 5 INJECTION INTRAVENOUS at 09:31

## 2021-05-13 RX ADMIN — DICLOFENAC SODIUM 2 G: 10 GEL TOPICAL at 15:53

## 2021-05-13 RX ADMIN — DICLOFENAC SODIUM 2 G: 10 GEL TOPICAL at 21:14

## 2021-05-13 RX ADMIN — ERTAPENEM SODIUM 1 G: 1 INJECTION, POWDER, LYOPHILIZED, FOR SOLUTION INTRAMUSCULAR; INTRAVENOUS at 15:52

## 2021-05-13 RX ADMIN — TRAMADOL HYDROCHLORIDE 50 MG: 50 TABLET, COATED ORAL at 17:12

## 2021-05-13 RX ADMIN — DICLOFENAC SODIUM 2 G: 10 GEL TOPICAL at 09:30

## 2021-05-13 RX ADMIN — ACETAMINOPHEN 975 MG: 325 TABLET, FILM COATED ORAL at 00:10

## 2021-05-13 RX ADMIN — ACETAMINOPHEN 975 MG: 325 TABLET, FILM COATED ORAL at 09:18

## 2021-05-13 RX ADMIN — OMEPRAZOLE 20 MG: 20 CAPSULE, DELAYED RELEASE ORAL at 09:21

## 2021-05-13 RX ADMIN — TRAMADOL HYDROCHLORIDE 50 MG: 50 TABLET, COATED ORAL at 09:38

## 2021-05-13 RX ADMIN — LIDOCAINE 2 PATCH: 560 PATCH PERCUTANEOUS; TOPICAL; TRANSDERMAL at 21:13

## 2021-05-13 RX ADMIN — POLYETHYLENE GLYCOL 3350 17 G: 17 POWDER, FOR SOLUTION ORAL at 01:01

## 2021-05-13 RX ADMIN — TRAMADOL HYDROCHLORIDE 50 MG: 50 TABLET, COATED ORAL at 00:57

## 2021-05-13 RX ADMIN — ACETAMINOPHEN 975 MG: 325 TABLET, FILM COATED ORAL at 13:29

## 2021-05-13 RX ADMIN — GABAPENTIN 200 MG: 100 CAPSULE ORAL at 09:20

## 2021-05-13 RX ADMIN — AMLODIPINE BESYLATE 10 MG: 10 TABLET ORAL at 09:19

## 2021-05-13 ASSESSMENT — ACTIVITIES OF DAILY LIVING (ADL)
ADLS_ACUITY_SCORE: 19

## 2021-05-13 NOTE — PLAN OF CARE
Afeb, BP normal in am before her meds but only 80's/40's at the second check, Dr sent a note. Pt with more back pain today not really relieved with tylenlol, tramadol, voltarin gel or ice and back rubs. Ambulating is more difficult today, moving slower, balance is poorer today. Voiding less frequently. IV abx continues. Dr did add icy hot patches prn. Son here and she must have told him she was doing better. Discharge still undetermined

## 2021-05-13 NOTE — PLAN OF CARE
Vital signs stable.  Alert  and oriented x 4, forgetful, needed remnders.  Lungs clear, diminished in bases.  Ambulated with walker, gait belt , sba of 1.  CMS baseline neuropathy in feet.  Dressing intact to left mid back.  Pain controlled with ultram, diclofenac gel and lidoderm patches.  Pt on iv Invanz.Contact isolation precautions.  Care plan reviewed with pt.

## 2021-05-13 NOTE — PROGRESS NOTES
[facility]    Hospitalist Progress Note    Date of Service (when I saw the patient): 05/13/2021  Provider:  Jhoan Baltazar MD   Text Page  7am - 6PM       Assessment & Plan   Daya Ignacio is a 89 year old female with a past medical history of hypertension, depression, mild cognitive impairment, kidney stones, lumbar compression fractures, chronic pain, UTIs who presents with mid back pain.       #History of recurrent UTI, abnormal UA: UA is again abnormal with possible indication of infection.  She is likely chronically going to have abnormal UAs and is colonized.  She denies any dysuria or increased frequency of urination.  She was started on ciprofloxacin   --Urine culture now growing ESBL organism.  Ciprofloxacin not effective for that. Appreciate ID consult. Plan to treat this episode   --On Ertapenem now      #Mid-back pain-Patient continues to be afebrile, nontachycardic, nontachypneic with a normal white count. CT abdomen pelvis showed no obstructing ureteral calculi or hydronephrosis but nonobstructing intrarenal calculi.   CT did show chronic severe thoracic and lumbar compression fractures.  Given that this pain is worse with movement with CT scan not showing any evidence of obstructing nephrolithiasis, suspicion for musculoskeletal strain in the setting of chronic back pain from her compression fractures.    -Scheduled Tylenol, lidocaine patch.  Topicals.  Tramadol to 50 mg every 6 hours as needed.  PT consulted. Consulted orthotics given compression fractures.  -Patient son reports that she did have some increased left flank pain.  Urine culture now positive.  Also appears to have developed a rash on the flank area.  Question of shingles.  Will request ID consult      #Flank rash. Erythematous area on left flank noticed during this hospital stay. Appears to have a blister that ruptured.   -- No obvious vesicles noted to suggest zoster, but monitor closely   -- WOC nurse consult    -- Does not appear like cellulitis although should be covered by ertapenem      #Possible radiologic early partial SBO vs ileus: There was mild dilation of the small bowel noted which can be seen due to ileus versus early partial small bowel obstruction.  She denies any nausea vomiting.  She had normal BM prior to admission. She states she is passing gas.  Clinically this does not seem like a SBO or ileus  --Monitor clinically     #HTN: Continue home coreg, lisinopril and imdur.  Resumed home amlodipine    #Depression: Continue home sertraline  #Known hx of left Adnexal cyst        DVT Prophylaxis: Pneumatic Compression Devices  Code Status: No CPR- Do NOT Intubate    Disposition: Expected discharge in couple of days.    Interval History   Complain of pain on top of lumbar spine exacerbated with movements. No other issues    -Data reviewed today: I reviewed all new labs and imaging results over the last 24 hours.     Physical Exam   Temp: 97.9  F (36.6  C) Temp src: Temporal BP: (Abnormal) 86/41 Pulse: 58   Resp: 18 SpO2: 93 % O2 Device: None (Room air)    Vitals:    05/08/21 2351   Weight: 58.2 kg (128 lb 6.4 oz)     Vital Signs with Ranges  Temp:  [96.5  F (35.8  C)-97.9  F (36.6  C)] 97.9  F (36.6  C)  Pulse:  [58-66] 58  Resp:  [14-18] 18  BP: ()/(41-52) 86/41  SpO2:  [91 %-94 %] 93 %  I/O last 3 completed shifts:  In: 1485 [P.O.:1485]  Out: 675 [Urine:675]    GEN:  Alert, cooperates, appears comfortable, NAD.  HEENT: [place]. Normocephalic/atraumatic, no scleral icterus, no nasal discharge, mouth moist.  CV:  Regular rate and rhythm, no murmur or JVD.  S1 + S2 noted, no S3 or S4.  LUNGS:  Clear to auscultation bilaterally without rales/rhonchi/wheezing/retractions.  Symmetric chest rise on inhalation noted.  ABD:  Active bowel sounds, soft, non-tender/non-distended.  No rebound/guarding/rigidity. Left flank erythema and de roofed blisters.    EXT:  No edema or cyanosis.  No joint synovitis noted.  SKIN:   Dry to touch, no exanthems noted in the visualized areas.       Medications     sodium chloride 100 mL/hr at 05/13/21 0931       acetaminophen  975 mg Oral TID     amLODIPine  10 mg Oral Daily     carvedilol  12.5 mg Oral BID w/meals     cloNIDine  0.05 mg Oral BID     diclofenac  2 g Topical 4x Daily     ertapenem (INVanz) IV  1 g Intravenous Q24H     gabapentin  200 mg Oral BID     isosorbide dinitrate  10 mg Oral TID     lidocaine  2 patch Transdermal Q24h    And     lidocaine   Transdermal Q8H     lisinopril  30 mg Oral Daily     menthol   Transdermal Q8H     omeprazole  20 mg Oral Daily     senna-docusate  1 tablet Oral At Bedtime     sertraline  100 mg Oral Daily       Data   Recent Labs   Lab 05/12/21  0751 05/09/21  0557 05/08/21  1939   WBC 7.3 8.0 8.3   HGB 10.0* 10.2* 10.3*   MCV 97 97 96    196 193    135 134   POTASSIUM 4.0 4.7 3.8   CHLORIDE 105 106 104   CO2 27 28 29   BUN 17 26 31*   CR 0.63 0.80 0.87   ANIONGAP 4 1* 1*   DAVE 9.1 9.0 9.0   GLC 93 93 105*       No results found for this or any previous visit (from the past 24 hour(s)).          Disclaimer: This note consists of symbols derived from keyboarding, dictation and/or voice recognition software. As a result, there may be errors in the script that have gone undetected. Please consider this when interpreting information found in this chart.

## 2021-05-13 NOTE — PLAN OF CARE
Pt alert, forgetful at times. Uses call light appropriately. Ultram and lidocaine patches for pain. Up with SBA. Large BM overnight. Voiding symptoms improving. mepilex to back is intact.

## 2021-05-13 NOTE — PROGRESS NOTES
[facility]  Infectious Disease Progress Note          Assessment and Plan:   IMPRESSION:     1.  A 89-year-old female, well known to me from multiple prior admissions with complicated infection and medical history, now admitted with back pain, left flank discomfort, maybe slight cognition changes and possible small bowel obstruction, not perceived to be infection related, but historically has looked similar with prior UTI- related infections including the pain, so not entirely excluded that this is the actual clinical event here.  2.  ESBL positive urine culture with grossly abnormal urine, colonized chronically, but also with major recurrent infection, so hard to exclude completely that this is true infection.  3.  History of chronically colonized urine, prior stones including obstruction and multiple prior major infections including candidemia and bacteremias in the past.  4.  Prior C. diff, but not since 2019.  5.  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  6.  Bilateral total hip arthroplasties as a threatened location, but no prior infections.  7.  Mild chronic renal dysfunction.  8.  LISTED PENICILLIN AND NITROFURANTOIN ALLERGIES.  WE HAVE GIVEN HER SULFA BEFORE WHEN FORCED WHICH LISTED AS ALLERGIC, BUT THIS IS A FAMILY HISTORY OF THAT, NOT A PERSONAL HISTORY.     RECOMMENDATIONS:     1.  Difficult to know if infection issue however, adding everything together, certainly would treat at this point.  We will start ertapenem and plan on at least a short course.  2.  Get blood cultures before we start ertapenem.  Historically, in very similar circumstances, she has actually been bacteremic when this is occurring, has not had any effective agents against the organism yet, so would still likely find the organism on the Cipro she is on.  Obviously, discontinue Cipro.  3.  Previously addressed the overall picture.  There has been a large amount of interest in trying to do prophylactic  antibiotics, but they have no role to play here whatsoever.  She has highly resistant organisms, so despite getting recurrent sepsis and major infections, prophylaxis is not indicated and counterproductive.  In addition, the problematic long term situation here is whether to treat her asymptomatic bacteriuria given that she has had multiple recurrent major infections.  Usual practice is best not to treat her when it is truly asymptomatic bacteriuria; that does not mean, of course, here that asymptomatic bacteriuria is a benign condition.  It certainly is a threat of bacteremia/ hips, etc.  4 Back same and unlikely kidney/infection, day 3 erta now, if here longer but if disposition ready OK stop and just short course           Interval History:   no new complaints and doing well except pain; no cp, sob, n/v/d, or abd pain.              Medications:       acetaminophen  975 mg Oral TID     amLODIPine  10 mg Oral Daily     carvedilol  12.5 mg Oral BID w/meals     cloNIDine  0.05 mg Oral BID     diclofenac  2 g Topical 4x Daily     ertapenem (INVanz) IV  1 g Intravenous Q24H     gabapentin  200 mg Oral BID     isosorbide dinitrate  10 mg Oral TID     lidocaine  2 patch Transdermal Q24h    And     lidocaine   Transdermal Q8H     lisinopril  30 mg Oral Daily     menthol   Transdermal Q8H     omeprazole  20 mg Oral Daily     senna-docusate  1 tablet Oral At Bedtime     sertraline  100 mg Oral Daily                  Physical Exam:   Blood pressure (!) 86/41, pulse 58, temperature 97.9  F (36.6  C), temperature source Temporal, resp. rate 18, weight 58.2 kg (128 lb 6.4 oz), SpO2 93 %, not currently breastfeeding.  Wt Readings from Last 2 Encounters:   05/08/21 58.2 kg (128 lb 6.4 oz)   11/18/20 57.6 kg (127 lb)     Vital Signs with Ranges  Temp:  [96.5  F (35.8  C)-97.9  F (36.6  C)] 97.9  F (36.6  C)  Pulse:  [58-66] 58  Resp:  [14-18] 18  BP: ()/(41-52) 86/41  SpO2:  [91 %-94 %] 93 %    Constitutional: Awake, alert,  cooperative, no apparent distress   Lungs: Clear to auscultation bilaterally, no crackles or wheezing   Cardiovascular: Regular rate and rhythm, normal S1 and S2, and no murmur noted   Abdomen: Normal bowel sounds, soft, non-distended, non-tender   Skin: No rashes, no cyanosis, no edema   Other:           Data:   All microbiology laboratory data reviewed.  Recent Labs   Lab Test 05/12/21  0751 05/09/21  0557 05/08/21  1939   WBC 7.3 8.0 8.3   HGB 10.0* 10.2* 10.3*   HCT 31.7* 31.9* 31.1*   MCV 97 97 96    196 193     Recent Labs   Lab Test 05/12/21  0751 05/09/21  0557 05/08/21  1939   CR 0.63 0.80 0.87     No lab results found.  Recent Labs   Lab Test 05/11/21  1613 05/11/21  1607 05/09/21  0120 11/02/20  0819 11/02/20  0817 10/31/20  1130 10/31/20  0941 10/31/20  0938 05/09/20  0944   CULT No growth after 2 days No growth after 2 days >100,000 colonies/mL  Aerococcus urinae  Identification obtained by MALDI-TOF mass spectrometry research use only database. Test   characteristics determined and verified by the Infectious Diseases Diagnostic Laboratory   ([facility]) [place], [place].  Aerococcus is usually susceptible to penicillin, cephalosporins, tetracycline and   vancomycin.  *  >100,000 colonies/mL  Escherichia coli ESBL  ESBL (extended beta lactamase) producing organisms require contact precautions.  * No growth No growth <10,000 colonies/mL  Proteus mirabilis  *  <10,000 colonies/mL  Enterococcus faecium  *  <10,000 colonies/mL  Strain 2  Enterococcus faecium  *  <10,000 colonies/mL  Candida glabrata complex  Susceptibility testing not routinely done  *  ID requested by Dr. Camejo on 11.2.20 at 1311. bw Cultured on the 1st day of incubation:  Candida glabrata complex  *  Critical Value/Significant Value, preliminary result only, called to and read back by  Elke Peña RN at 0155 11.2.20. amd   Cultured on the 1st day of incubation:  Candida glabrata complex  *  Critical Value/Significant Value,  preliminary result only, called to and read back by  Elke Peña RN 11/2/20 @ 0250 TF    Susceptibility testing done on previous specimen No growth

## 2021-05-14 ENCOUNTER — APPOINTMENT (OUTPATIENT)
Dept: PHYSICAL THERAPY | Facility: CLINIC | Age: 86
DRG: 690 | End: 2021-05-14
Attending: HOSPITALIST
Payer: COMMERCIAL

## 2021-05-14 LAB
C DIFF TOX B STL QL: NEGATIVE
SPECIMEN SOURCE: NORMAL

## 2021-05-14 PROCEDURE — 250N000013 HC RX MED GY IP 250 OP 250 PS 637: Performed by: HOSPITALIST

## 2021-05-14 PROCEDURE — 97530 THERAPEUTIC ACTIVITIES: CPT | Mod: GP | Performed by: PHYSICAL THERAPIST

## 2021-05-14 PROCEDURE — 87493 C DIFF AMPLIFIED PROBE: CPT | Performed by: HOSPITALIST

## 2021-05-14 PROCEDURE — 250N000011 HC RX IP 250 OP 636: Performed by: INTERNAL MEDICINE

## 2021-05-14 PROCEDURE — 250N000013 HC RX MED GY IP 250 OP 250 PS 637: Performed by: INTERNAL MEDICINE

## 2021-05-14 PROCEDURE — 99233 SBSQ HOSP IP/OBS HIGH 50: CPT | Performed by: HOSPITALIST

## 2021-05-14 PROCEDURE — 97162 PT EVAL MOD COMPLEX 30 MIN: CPT | Mod: GP | Performed by: PHYSICAL THERAPIST

## 2021-05-14 PROCEDURE — 120N000001 HC R&B MED SURG/OB

## 2021-05-14 RX ORDER — SACCHAROMYCES BOULARDII 250 MG
250 CAPSULE ORAL 2 TIMES DAILY
Status: DISCONTINUED | OUTPATIENT
Start: 2021-05-14 | End: 2021-05-22 | Stop reason: HOSPADM

## 2021-05-14 RX ADMIN — LIDOCAINE 2 PATCH: 560 PATCH PERCUTANEOUS; TOPICAL; TRANSDERMAL at 21:05

## 2021-05-14 RX ADMIN — DICLOFENAC SODIUM 2 G: 10 GEL TOPICAL at 21:08

## 2021-05-14 RX ADMIN — GABAPENTIN 200 MG: 100 CAPSULE ORAL at 21:07

## 2021-05-14 RX ADMIN — Medication 250 MG: at 21:07

## 2021-05-14 RX ADMIN — AMLODIPINE BESYLATE 10 MG: 10 TABLET ORAL at 09:06

## 2021-05-14 RX ADMIN — ISOSORBIDE DINITRATE 10 MG: 10 TABLET ORAL at 14:06

## 2021-05-14 RX ADMIN — DICLOFENAC SODIUM 2 G: 10 GEL TOPICAL at 12:58

## 2021-05-14 RX ADMIN — ISOSORBIDE DINITRATE 10 MG: 10 TABLET ORAL at 09:06

## 2021-05-14 RX ADMIN — OMEPRAZOLE 20 MG: 20 CAPSULE, DELAYED RELEASE ORAL at 09:03

## 2021-05-14 RX ADMIN — ACETAMINOPHEN 975 MG: 325 TABLET, FILM COATED ORAL at 00:43

## 2021-05-14 RX ADMIN — SERTRALINE HYDROCHLORIDE 100 MG: 100 TABLET ORAL at 09:06

## 2021-05-14 RX ADMIN — ISOSORBIDE DINITRATE 10 MG: 10 TABLET ORAL at 21:07

## 2021-05-14 RX ADMIN — ACETAMINOPHEN 975 MG: 325 TABLET, FILM COATED ORAL at 09:02

## 2021-05-14 RX ADMIN — ERTAPENEM SODIUM 1 G: 1 INJECTION, POWDER, LYOPHILIZED, FOR SOLUTION INTRAMUSCULAR; INTRAVENOUS at 17:07

## 2021-05-14 RX ADMIN — CLONIDINE HYDROCHLORIDE 0.05 MG: 0.1 TABLET ORAL at 21:07

## 2021-05-14 RX ADMIN — ACETAMINOPHEN 975 MG: 325 TABLET, FILM COATED ORAL at 14:05

## 2021-05-14 RX ADMIN — CARVEDILOL 12.5 MG: 12.5 TABLET, FILM COATED ORAL at 09:05

## 2021-05-14 RX ADMIN — CARVEDILOL 12.5 MG: 12.5 TABLET, FILM COATED ORAL at 17:11

## 2021-05-14 RX ADMIN — TRAMADOL HYDROCHLORIDE 50 MG: 50 TABLET, COATED ORAL at 13:04

## 2021-05-14 RX ADMIN — GABAPENTIN 200 MG: 100 CAPSULE ORAL at 09:03

## 2021-05-14 RX ADMIN — DICLOFENAC SODIUM 2 G: 10 GEL TOPICAL at 17:07

## 2021-05-14 RX ADMIN — DICLOFENAC SODIUM 2 G: 10 GEL TOPICAL at 09:11

## 2021-05-14 RX ADMIN — LISINOPRIL 30 MG: 20 TABLET ORAL at 09:04

## 2021-05-14 ASSESSMENT — ACTIVITIES OF DAILY LIVING (ADL)
ADLS_ACUITY_SCORE: 19
ADLS_ACUITY_SCORE: 19
ADLS_ACUITY_SCORE: 18
ADLS_ACUITY_SCORE: 19
ADLS_ACUITY_SCORE: 19
ADLS_ACUITY_SCORE: 18

## 2021-05-14 NOTE — PLAN OF CARE
/50   Pulse 66   Temp 96.8  F (36  C) (Temporal)   Resp 14   Wt 58.2 kg (128 lb 6.4 oz)   SpO2 92%   BMI 25.08 kg/m    Orientation: A&Ox4, forgetful  Resp:LS Diminished, RA, denies SOB.  Cardio: BP soft, held most of her BP meds.  Skin: Rash on face and blister to Left back is covered with mepilex. Orders to not lift mepilex due to fragile skin but to do wound care every 3 days.  Pain: Managed with Tylenol, Ultram, voltarin gel, and lido patches.  Activity: Ambulating has been an issue this shift as well as day shift, she has poor balance, pt states her legs feel very weak, and she could not make it back to bed from the bathroom, we had to get a wheelchair. For tonight I would suggest A2, bedside commode until weakness resolves.  Diet: tolerating regular  Voiding: large soft stool, voiding spontaneously without difficulty.   Plan: will continue to monitor.

## 2021-05-14 NOTE — PLAN OF CARE
Pt A/O, forgetful at times. Tylenol and lidocaine patches for pain. Contact precautions. Urinary symptoms improving. Weak overnight, up to bedside commode with assist of 1-2.

## 2021-05-14 NOTE — PROGRESS NOTES
[facility]    Hospitalist Progress Note    Date of Service (when I saw the patient): 05/14/2021  Provider:  Jhoan Baltazar MD   Text Page  7am - 6PM       Assessment & Plan   Daya Ignacio is a 89 year old female with a past medical history of hypertension, depression, mild cognitive impairment, kidney stones, lumbar compression fractures, chronic pain, UTIs who presents with mid back pain.       #History of recurrent UTI, abnormal UA: UA is again abnormal with possible indication of infection.  She is likely chronically going to have abnormal UAs and is colonized.  She denies any dysuria or increased frequency of urination.  She was started on ciprofloxacin   --Urine culture now growing ESBL organism.  Ciprofloxacin not effective for that. Appreciate ID consult. Plan to treat this episode   --On Ertapenem now      #Mid-back pain-Patient continues to be afebrile, nontachycardic, nontachypneic with a normal white count. CT abdomen pelvis showed no obstructing ureteral calculi or hydronephrosis but nonobstructing intrarenal calculi.   CT did show chronic severe thoracic and lumbar compression fractures.  Given that this pain is worse with movement with CT scan not showing any evidence of obstructing nephrolithiasis, suspicion for musculoskeletal strain in the setting of chronic back pain from her compression fractures.    -Scheduled Tylenol, lidocaine patch.  Topicals.  Tramadol to 50 mg every 6 hours as needed.  PT consulted. Consulted orthotics given compression fractures.  -Patient son reports that she did have some increased left flank pain.  Urine culture now positive.  Also appears to have developed a rash on the flank area.  Question of shingles.  Will request ID consult      #Flank rash. Erythematous area on left flank noticed during this hospital stay. Appears to have a blister that ruptured.   -- No obvious vesicles noted to suggest zoster, but monitor closely   -- WOC nurse consult    -- Does not appear like cellulitis although should be covered by ertapenem      #Possible radiologic early partial SBO vs ileus: There was mild dilation of the small bowel noted which can be seen due to ileus versus early partial small bowel obstruction.  She denies any nausea vomiting.  She had normal BM prior to admission. She states she is passing gas.  Clinically this does not seem like a SBO or ileus  --Monitor clinically     #HTN: Continue home coreg, lisinopril and imdur.  Resumed home amlodipine    #Depression: Continue home sertraline  #Known hx of left Adnexal cyst  # New onset of left hip pain. Difficulty ambulation and bearing weight, positive internal rotation maneuver eliciting pain. Assessment and treatment by PT requested.         DVT Prophylaxis: Pneumatic Compression Devices  Code Status: No CPR- Do NOT Intubate    Disposition: Expected discharge in couple of days.    Interval History   Complain of pain left hip precluding bearing weight and limiting ambulation. New onset. Back pain in better control.  Son updated at bedside.     -Data reviewed today: I reviewed all new labs and imaging results over the last 24 hours.     Physical Exam   Temp: 97  F (36.1  C) Temp src: Temporal BP: 121/44 Pulse: 61   Resp: 16 SpO2: 93 % O2 Device: None (Room air)    Vitals:    05/08/21 2351   Weight: 58.2 kg (128 lb 6.4 oz)     Vital Signs with Ranges  Temp:  [96.8  F (36  C)-98.2  F (36.8  C)] 97  F (36.1  C)  Pulse:  [60-70] 61  Resp:  [14-20] 16  BP: (104-146)/(44-74) 121/44  SpO2:  [92 %-96 %] 93 %  I/O last 3 completed shifts:  In: 780 [P.O.:780]  Out: -     GEN:  Alert, cooperates, appears comfortable, NAD.  HEENT: [place]. Normocephalic/atraumatic, no scleral icterus, no nasal discharge, mouth moist.  CV:  Regular rate and rhythm, no murmur or JVD.  S1 + S2 noted, no S3 or S4.  LUNGS:  Clear to auscultation bilaterally without rales/rhonchi/wheezing/retractions.  Symmetric chest rise on inhalation noted.  ABD:   Active bowel sounds, soft, non-tender/non-distended.  No rebound/guarding/rigidity. Left flank erythema and de roofed blisters.    EXT:  No edema or cyanosis.  No joint synovitis noted.  SKIN:  Dry to touch, no exanthems noted in the visualized areas.       Medications     sodium chloride 100 mL/hr at 05/13/21 0931       acetaminophen  975 mg Oral TID     amLODIPine  10 mg Oral Daily     carvedilol  12.5 mg Oral BID w/meals     cloNIDine  0.05 mg Oral BID     diclofenac  2 g Topical 4x Daily     ertapenem (INVanz) IV  1 g Intravenous Q24H     gabapentin  200 mg Oral BID     isosorbide dinitrate  10 mg Oral TID     lidocaine  2 patch Transdermal Q24h    And     lidocaine   Transdermal Q8H     lisinopril  30 mg Oral Daily     menthol   Transdermal Q8H     omeprazole  20 mg Oral Daily     senna-docusate  1 tablet Oral At Bedtime     sertraline  100 mg Oral Daily       Data   Recent Labs   Lab 05/12/21  0751 05/09/21  0557 05/08/21  1939   WBC 7.3 8.0 8.3   HGB 10.0* 10.2* 10.3*   MCV 97 97 96    196 193    135 134   POTASSIUM 4.0 4.7 3.8   CHLORIDE 105 106 104   CO2 27 28 29   BUN 17 26 31*   CR 0.63 0.80 0.87   ANIONGAP 4 1* 1*   DAVE 9.1 9.0 9.0   GLC 93 93 105*       No results found for this or any previous visit (from the past 24 hour(s)).          Disclaimer: This note consists of symbols derived from keyboarding, dictation and/or voice recognition software. As a result, there may be errors in the script that have gone undetected. Please consider this when interpreting information found in this chart.

## 2021-05-14 NOTE — PLAN OF CARE
Afeb, BP did not drop as far today as yesterday after her morning meds but pt did get weak and SOB when up to the BR midmorning and needed 2 people to get back to bed.  informed and did order PT to repeat their eval since she is definitely weaker and poorer balance since they saw her 2 days ago and signed off. Having loose stools today unsure if its from the abx. Back pain is the same so ID doesn't feel its due to the UTI but will keep treating the urine with IV abx while pt in the hospital. Pain meds given during the  day but pt sleeps all night so no narcotic given then. Await PT reeval for recommendations.

## 2021-05-14 NOTE — CONSULTS
CLINICAL NUTRITION SERVICES  -  ASSESSMENT NOTE      MALNUTRITION:  % Weight Loss:  None noted  % Intake:  No decreased intake noted  Subcutaneous Fat Loss:  Upper arm region at least mild depletion --> not using as indicator with only 1 region present   Muscle Loss:  Temporal region mild depletion, Clavicle bone region at least mild depletion and Acromion bone region at least mild depletion --> limited opportunity for physical exam today with patient sleeping and could not visualize LEs  Fluid Retention:  None noted     Malnutrition Diagnosis: Patient does not meet two of the above criteria necessary for diagnosing malnutrition        REASON FOR ASSESSMENT  Daya Ignacio is a 89 year old female seen by Registered Dietitian for [facility] of: Mild cognitive impairment, lumbar compression fractures, chronic pain, UTI.     Admit 2/2: Pain, possible SBO vs ileus.    NUTRITION HISTORY  - Information obtained from chart as patient sleeping, did not wake to light touch or sound of voice.    - Allergies: NKFA.      CURRENT NUTRITION ORDERS  Diet Order:     Regular    Current Intake/Tolerance:  % intakes per flowsheet review since admit and ordering meals TID.  1 instance of 25% meal consumption noted.      NUTRITION FOCUSED PHYSICAL ASSESSMENT FOR DIAGNOSING MALNUTRITION)  Yes, visual only    Obtained from Chart/Interdisciplinary Team:  - *WOCN following for L back wounds d/t trauma, no documentation of PI  - Stooling patterns reviewed    ANTHROPOMETRICS  Height: 5'  Weight: 128 lbs 6.4 oz  Body mass index is 25.08 kg/m .  Weight Status:  Overweight BMI 25-29.9  Weight History:  Wt Readings from Last 10 Encounters:   05/08/21 58.2 kg (128 lb 6.4 oz)   11/18/20 57.6 kg (127 lb)   10/30/20 57.6 kg (127 lb)   09/30/20 56.2 kg (124 lb)   05/28/20 55.2 kg (121 lb 9.6 oz)   05/07/20 59.6 kg (131 lb 8 oz)   11/20/19 56.5 kg (124 lb 9.6 oz)   11/14/19 56.1 kg (123 lb 9.6 oz)   11/12/19 56.1 kg (123 lb 9.6 oz)   11/05/19  56.6 kg (124 lb 12.8 oz)     - Wt relatively stable, based on above.  No current documentation of edema.      LABS  Labs reviewed:  Electrolytes  Potassium (mmol/L)   Date Value   05/12/2021 4.0   05/09/2021 4.7   05/08/2021 3.8     Phosphorus (mg/dL)   Date Value   07/15/2019 5.5 (H)   06/24/2011 4.1   06/23/2011 4.4    Blood Glucose  Glucose (mg/dL)   Date Value   05/12/2021 93   05/09/2021 93   05/08/2021 105 (H)   11/03/2020 111 (H)   11/02/2020 111 (H)     Hemoglobin A1C (%)   Date Value   01/13/2017 5.3   08/08/2015 5.2    Inflammatory Markers  CRP Inflammation (mg/L)   Date Value   05/11/2020 21.5 (H)   05/10/2020 67.2 (H)   05/09/2020 182.0 (H)     WBC (10e9/L)   Date Value   05/12/2021 7.3   05/09/2021 8.0   05/08/2021 8.3     Albumin (g/dL)   Date Value   11/03/2020 2.6 (L)   05/06/2020 3.7   08/21/2019 3.0 (L)      Magnesium (mg/dL)   Date Value   08/29/2019 1.7   08/26/2019 1.8   08/01/2019 1.5 (L)     Sodium (mmol/L)   Date Value   05/12/2021 136   05/09/2021 135   05/08/2021 134    Renal  Urea Nitrogen (mg/dL)   Date Value   05/12/2021 17   05/09/2021 26   05/08/2021 31 (H)     Creatinine (mg/dL)   Date Value   05/12/2021 0.63   05/09/2021 0.80   05/08/2021 0.87     Additional  Triglycerides (mg/dL)   Date Value   02/19/2019 95   02/05/2018 92   01/13/2017 94     Ketones Urine (mg/dL)   Date Value   05/09/2021 Trace (A)        B/P: 125/74, T: 98.2, P: 70, R: 18      MEDICATIONS  Medications reviewed:    acetaminophen  975 mg Oral TID     amLODIPine  10 mg Oral Daily     carvedilol  12.5 mg Oral BID w/meals     cloNIDine  0.05 mg Oral BID     diclofenac  2 g Topical 4x Daily     ertapenem (INVanz) IV  1 g Intravenous Q24H     gabapentin  200 mg Oral BID     isosorbide dinitrate  10 mg Oral TID     lidocaine  2 patch Transdermal Q24h    And     lidocaine   Transdermal Q8H     lisinopril  30 mg Oral Daily     menthol   Transdermal Q8H     omeprazole  20 mg Oral Daily     senna-docusate  1 tablet Oral At  Bedtime     sertraline  100 mg Oral Daily        sodium chloride 100 mL/hr at 05/13/21 0931          ASSESSED NUTRITION NEEDS PER APPROVED PRACTICE GUIDELINES:    Dosing Weight 58 kg   Estimated Energy Needs: 25-30 Kcal/Kg  Justification: maintenance  Estimated Protein Needs: 1.2-1.5 g pro/Kg  Justification: preservation of lean body mass, advanced age  Estimated Fluid Needs: per MD      NUTRITION DIAGNOSIS:  Predicted inadequate nutrient intake (energy/protein) related to potential for decline in PO intakes during admit, pending LOS.    NUTRITION INTERVENTIONS  Recommendations / Nutrition Prescription  Diet as ordered.        Implementation  Nutrition education: No education needs assessed at this time    Nutrition Goals  Patient to consume at least 75% of meals TID.      MONITORING AND EVALUATION:  Progress towards goals will be monitored and evaluated per protocol and Practice Guidelines          Lizzy Hilliard RDN, LD  Clinical Dietitian  3rd floor/ICU: [phone]  All other floors: [phone]  Weekend/holiday: [phone]

## 2021-05-14 NOTE — PROGRESS NOTES
"   05/14/21 1534   Quick Adds   Type of Visit PT Re-evaluation       Present no   Language English   Living Environment   People in home alone   Current Living Arrangements assisted living   Home Accessibility no concerns   Transportation Anticipated family or friend will provide   Living Environment Comments lives in a Sr. apt   Self-Care   Usual Activity Tolerance good   Current Activity Tolerance moderate   Disability/Function   Hearing Difficulty or Deaf no   Wear Glasses or Blind yes   Vision Management glasses   Concentrating, Remembering or Making Decisions Difficulty no   Difficulty Communicating no   Difficulty Eating/Swallowing no   Walking or Climbing Stairs Difficulty yes   Walking or Climbing Stairs ambulation difficulty, requires equipment   Mobility Management 2WW   Dressing/Bathing Difficulty no   Toileting issues no   Doing Errands Independently Difficulty (such as shopping) no   Fall history within last six months no   Change in Functional Status Since Onset of Current Illness/Injury yes   General Information   Onset of Illness/Injury or Date of Surgery 05/08/21   Referring Physician Gael Lang MD   Patient/Family Therapy Goals Statement (PT) Walk better   Pertinent History of Current Problem (include personal factors and/or comorbidities that impact the POC) per chart: Daya Ignacio is a 89 year old female with a past medical history of hypertension, depression, mild cognitive impairment, kidney stones, lumbar compression fractures, chronic pain, UTIs who presents with mid back pain; CT abdomen pelvis showed no obstructing ureteral calculi or hydronephrosis but nonobstructing intrarenal calculi.   CT did show chronic severe thoracic and lumbar compression fractures; see medical record for further information. Per RN note 5/14, \"Ambulating has been an issue this shift as well as day shift, she has poor balance, pt states her legs feel very weak, and she could not make it " "back to bed from the bathroom, we had to get a wheelchair. For tonight I would suggest A2, bedside commode until weakness resolves.\"    Cognition   Orientation Status (Cognition) person;place;situation   Affect/Mental Status (Cognition) anxious   Follows Commands (Cognition) follows two-step commands;50-74% accuracy   Behavioral Issues difficulty managing stress   Safety Deficit (Cognition) minimal deficit;ability to follow commands;insight into deficits/self-awareness;problem-solving   Cognitive Status Comments forgetful   Pain Assessment   Patient Currently in Pain Yes, see Vital Sign flowsheet   Integumentary/Edema   Integumentary/Edema no deficits were identifed   Integumentary/Edema Comments L flank bandage, per pt sometimes itchy/red   Posture    Posture Forward head position;Protracted shoulders;Kyphosis   Posture Comments Pronounced kyphosis   Range of Motion (ROM)   ROM Quick Adds ROM deficits secondary to pain   ROM Comment trunk limited by pain, L hip limited by pain   Strength   Manual Muscle Testing Quick Adds Deficits observed during functional mobility   Strength Comments noticeable truncal weakness in static sitting needing Elida to bring upright and unable to sustain for >30s. B LE strong in major planes   Bed Mobility   Bed Mobility supine-sit   Supine-Sit [place] (Bed Mobility) contact guard   Bed Mobility Limitations impaired ability to control trunk for mobility   Comment (Bed Mobility) CGA   Transfers   Transfers sit-stand transfer;bed-chair transfer   Transfer Safety Concerns Noted losing balance backward;decreased balance during turns   Impairments Contributing to Impaired Transfers impaired balance;pain;decreased strength   Bed-Chair Transfer   Bed-Chair [place] (Transfers) moderate assist (50% patient effort)   Assistive Device (Bed-Chair Transfers) walker, front-wheeled   Sit-Stand Transfer   Sit/Stand Transfer Comments mod A initially for sit>stand from bed, improved to Elida from " in-room couch and chair   Gait/Stairs (Locomotion)   [place] Level (Gait) minimum assist (75% patient effort);moderate assist (50% patient effort)   Assistive Device (Gait) walker, front-wheeled   Distance in Feet (Required for LE Total Joints) 25   Pattern (Gait) step-to  (decreased L LE WB)   Deviations/Abnormal Patterns (Gait) antalgic;base of support, narrow;clement decreased;stride length decreased;weight shifting decreased   Comment (Gait/Stairs) unable to ambulate >25ft safely with 2WW and Elida 2/2 weakness and endurance   Balance   Balance other (describe)   Sitting Balance: Static fair balance   Sitting Balance: Dynamic fair balance   Standing Balance: Static poor balance   Standing Balance: Dynamic poor balance   Balance Comments   (at baseline pt uses walker indep with mild unsteadiness)   Balance Quick Add Sitting balance: Static;Sitting balance: dynamic;Standing balance: static;Standing balance: dynamic   Sensory Examination   Sensory Perception patient reports no sensory changes  (per chart review, B LE numbness at baseline)   Coordination   Coordination no deficits were identified   Muscle Tone   Muscle Tone Comments Decreased B LE flexibility in all 4 extremities   Clinical Impression   Criteria for Skilled Therapeutic Intervention yes, treatment indicated   PT Diagnosis (PT) impaired gait/transfers   Influenced by the following impairments truncal weakness, low back and hip pain, B LE numbness at baseline, balance, cardiovascular endurance   Functional limitations due to impairments impaired gait, transfers, independence with fn mobility and cares   Clinical Presentation Evolving/Changing   Clinical Presentation Rationale Pt presenting with multiple comorbidities affecting presentation, assessment including strength, balance, sensation, fn mobility   Clinical Decision Making (Complexity) moderate complexity   Therapy Frequency (PT) 5x/week   Predicted Duration of Therapy Intervention (days/wks)  "3 days   Planned Therapy Interventions (PT) bed mobility training;gait training;transfer training;strengthening;stretching;balance training   Anticipated Equipment Needs at Discharge (PT) walker, rolling;other (see comments)   Risk & Benefits of therapy have been explained evaluation/treatment results reviewed;care plan/treatment goals reviewed;participants voiced agreement with care plan;participants included;patient   Clinical Impression Comments worse balance/fn mobility this re-eval than 2 days ago when pt was amb >200ft with mod. Pt also concerned, endorsing \"yesterday I was able to walk and now I'm not. I don't know what's wrong with my legs, they won't do what I want them to do. I am afraid of faling.\"   PT Discharge Planning    PT Discharge Recommendation (DC Rec) Transitional Care Facility   PT Rationale for DC Rec Pt significantly below baseline, had been indep with mobilty needs at [facility] and also presented as indepedent as early as 2 days ago while hospitalized here, exhibiting ability to amb > 200ft with no assistance, no loss of balance during PT session. Currently pt needs up to mod A with transfers/ambulation and presenting as high falls risk. Pt's leg length tested strong, and pt able to swing legs onto/off bed independently. However, with upright mobility pt presenting with very unsteady gait, now needing min-mod A with upright activity. In sitting, pt with moderate truncal weakness as well needing Elida to stay upright. Recommend TCU to reduce risk of falling and re-hospitalization. If pt/fam refusing, recommend returning to [facility] with hands-on assist x 1 person with walks/transfers.    PT Brief overview of current status  min-mod A with 2WW    Total Evaluation Time   Total Evaluation Time (Minutes) 10   Coping Strategies   Trust Relationship/Rapport care explained;choices provided;emotional support provided;empathic listening provided;questions answered;questions encouraged;reassurance " provided;thoughts/feelings acknowledged

## 2021-05-14 NOTE — PROGRESS NOTES
[facility]  Infectious Disease Progress Note          Assessment and Plan:   IMPRESSION:     1.  A 89-year-old female, well known to me from multiple prior admissions with complicated infection and medical history, now admitted with back pain, left flank discomfort, maybe slight cognition changes and possible small bowel obstruction, not perceived to be infection related, but historically has looked similar with prior UTI- related infections including the pain, so not entirely excluded that this is the actual clinical event here.  2.  ESBL positive urine culture with grossly abnormal urine, colonized chronically, but also with major recurrent infection, so hard to exclude completely that this is true infection.  3.  History of chronically colonized urine, prior stones including obstruction and multiple prior major infections including candidemia and bacteremias in the past.  4.  Prior C. diff, but not since 2019.  5.  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  6.  Bilateral total hip arthroplasties as a threatened location, but no prior infections.  7.  Mild chronic renal dysfunction.  8.  LISTED PENICILLIN AND NITROFURANTOIN ALLERGIES.  WE HAVE GIVEN HER SULFA BEFORE WHEN FORCED WHICH LISTED AS ALLERGIC, BUT THIS IS A FAMILY HISTORY OF THAT, NOT A PERSONAL HISTORY.     RECOMMENDATIONS:     1.  Difficult to know if infection issue however, adding everything together, certainly would treat at this point.  We will start ertapenem and plan on at least a short course.  2.  Get blood cultures before we start ertapenem.  Historically, in very similar circumstances, she has actually been bacteremic when this is occurring, has not had any effective agents against the organism yet, so would still likely find the organism on the Cipro she is on.  Obviously, discontinue Cipro.  3.  Previously addressed the overall picture.  There has been a large amount of interest in trying to do prophylactic  antibiotics, but they have no role to play here whatsoever.  She has highly resistant organisms, so despite getting recurrent sepsis and major infections, prophylaxis is not indicated and counterproductive.  In addition, the problematic long term situation here is whether to treat her asymptomatic bacteriuria given that she has had multiple recurrent major infections.  Usual practice is best not to treat her when it is truly asymptomatic bacteriuria; that does not mean, of course, here that asymptomatic bacteriuria is a benign condition.  It certainly is a threat of bacteremia/ hips, etc.  4 Back same and unlikely kidney/infection related presenting issue, day 4 erta now, if here longer continue, but if disposition ready OK to stop and just do the short course she has had           Interval History:   no new complaints and doing well except pain; no cp, sob, n/v/d, or abd pain. Back pain about same no fever or fever sxs              Medications:       acetaminophen  975 mg Oral TID     amLODIPine  10 mg Oral Daily     carvedilol  12.5 mg Oral BID w/meals     cloNIDine  0.05 mg Oral BID     diclofenac  2 g Topical 4x Daily     ertapenem (INVanz) IV  1 g Intravenous Q24H     gabapentin  200 mg Oral BID     isosorbide dinitrate  10 mg Oral TID     lidocaine  2 patch Transdermal Q24h    And     lidocaine   Transdermal Q8H     lisinopril  30 mg Oral Daily     menthol   Transdermal Q8H     omeprazole  20 mg Oral Daily     senna-docusate  1 tablet Oral At Bedtime     sertraline  100 mg Oral Daily                  Physical Exam:   Blood pressure (!) 146/56, pulse 70, temperature 98.2  F (36.8  C), temperature source Temporal, resp. rate 18, weight 58.2 kg (128 lb 6.4 oz), SpO2 96 %, not currently breastfeeding.  Wt Readings from Last 2 Encounters:   05/08/21 58.2 kg (128 lb 6.4 oz)   11/18/20 57.6 kg (127 lb)     Vital Signs with Ranges  Temp:  [96.8  F (36  C)-98.2  F (36.8  C)] 98.2  F (36.8  C)  Pulse:  [58-70] 70  Resp:   [14-18] 18  BP: ()/(41-56) 146/56  SpO2:  [92 %-96 %] 96 %    Constitutional: Awake, alert, cooperative, no apparent distress   Lungs: Clear to auscultation bilaterally, no crackles or wheezing   Cardiovascular: Regular rate and rhythm, normal S1 and S2, and no murmur noted   Abdomen: Normal bowel sounds, soft, non-distended, non-tender   Skin: No rashes, no cyanosis, no edema   Other:           Data:   All microbiology laboratory data reviewed.  Recent Labs   Lab Test 05/12/21  0751 05/09/21  0557 05/08/21  1939   WBC 7.3 8.0 8.3   HGB 10.0* 10.2* 10.3*   HCT 31.7* 31.9* 31.1*   MCV 97 97 96    196 193     Recent Labs   Lab Test 05/12/21  0751 05/09/21  0557 05/08/21  1939   CR 0.63 0.80 0.87     No lab results found.  Recent Labs   Lab Test 05/11/21  1613 05/11/21  1607 05/09/21  0120 11/02/20  0819 11/02/20  0817 10/31/20  1130 10/31/20  0941 10/31/20  0938 05/09/20  0944   CULT No growth after 3 days No growth after 3 days >100,000 colonies/mL  Aerococcus urinae  Identification obtained by MALDI-TOF mass spectrometry research use only database. Test   characteristics determined and verified by the Infectious Diseases Diagnostic Laboratory   ([facility]) [place], [place].  Aerococcus is usually susceptible to penicillin, cephalosporins, tetracycline and   vancomycin.  *  >100,000 colonies/mL  Escherichia coli ESBL  ESBL (extended beta lactamase) producing organisms require contact precautions.  * No growth No growth <10,000 colonies/mL  Proteus mirabilis  *  <10,000 colonies/mL  Enterococcus faecium  *  <10,000 colonies/mL  Strain 2  Enterococcus faecium  *  <10,000 colonies/mL  Candida glabrata complex  Susceptibility testing not routinely done  *  ID requested by Dr. Camejo on 11.2.20 at 1311. bw Cultured on the 1st day of incubation:  Candida glabrata complex  *  Critical Value/Significant Value, preliminary result only, called to and read back by  Elke Peña RN at 0155 11.2.20. amd   Cultured on  the 1st day of incubation:  Candida glabrata complex  *  Critical Value/Significant Value, preliminary result only, called to and read back by  Elke Peña RN 11/2/20 @ 6296 TF    Susceptibility testing done on previous specimen No growth

## 2021-05-14 NOTE — CONSULTS
Care Management Initial Consult    General Information  Assessment completed with: Care Team Member, ROBERTA Duran at [facility] [phone]  Type of CM/SW Visit: Offer D/C Planning    Primary Care Provider verified and updated as needed:     Readmission within the last 30 days:      Reason for Consult: discharge planning  Advance Care Planning: Advance Care Planning Reviewed: no concerns identified        Communication Assessment  Patient's communication style: spoken language (English or Bilingual)    Hearing Difficulty or Deaf: no   Wear Glasses or Blind: yes    Cognitive  Cognitive/Neuro/Behavioral: WDL  Level of Consciousness: alert  Arousal Level: opens eyes spontaneously  Orientation: oriented x 4  Mood/Behavior: cooperative, calm  Best Language: 0 - No aphasia  Speech: clear    Living Environment:   People in home: facility resident     Current living Arrangements: assisted living  Name of Facility: [facility]   Able to return to prior arrangements:       Family/Social Support:  Care provided by: ([facility] staff)  Provides care for: no one, unable/limited ability to care for self  Marital Status:   Facility resident(s)/Staff          Description of Support System: Supportive, Involved    Support Assessment: Adequate family and caregiver support, Adequate social supports    Current Resources:   Patient receiving home care services:  No  Equipment currently used at home: walker, rolling    Employment/Financial:  Employment Status: retired     Financial Concerns: insurance, none     Lifestyle & Psychosocial Needs:     Socioeconomic History     Marital status:      Spouse name: Not on file     Number of children: Not on file     Years of education: Not on file     Highest education level: Not on file     Tobacco Use     Smoking status: Former Smoker     Types: Cigarettes     Smokeless tobacco: Never Used   Substance and Sexual Activity     Alcohol use: No     Drug use: No     Sexual  activity: Never     Functional Status:  Prior to admission patient needed assistance: Patient resides at [facility]. Received call from Renee (work cell # [phone]). Reports that pt resides in assisted living and facility provides assist with transfers (Ax1), laundry, meals, bathing, and medication administration. Patient ambulates with a walker and has been independent with dressing and toileting.     PT evaluated pt 5/9 and recommended home (was ambulating SBA). PT has been re-ordered 5/14 as pt has been assist of 1-2.     Per facility RN, they cannot accommodate Ax2 at [facility]. If pt is more than Ax1, she would need to use a fortino lift at facility. They report that they would like patient to discharge to TCU if she requires greater than an assist of 1.      Mental Health Status: A&O        Chemical Dependency Status:  N/A        Values/Beliefs:  Spiritual, Cultural Beliefs, [employer] Practices, Values that affect care:               Additional Information:  SW will continue to follow. Facility does not allow weekend returns.  If patient requires greater than an Ax1 facility is requesting that patient discharge to TCU prior to returning to [facility]. PT has been re-consulted 5/14. SW will follow up with patient and family after updated therapy recommendations.     If patient returns to [facility] Monday, will need orders faxed to (f) [phone]. Facility uses [facility].     SW will continue to follow.     PRISCILA Andino

## 2021-05-15 ENCOUNTER — APPOINTMENT (OUTPATIENT)
Dept: PHYSICAL THERAPY | Facility: CLINIC | Age: 86
DRG: 690 | End: 2021-05-15
Payer: COMMERCIAL

## 2021-05-15 PROCEDURE — 250N000013 HC RX MED GY IP 250 OP 250 PS 637: Performed by: INTERNAL MEDICINE

## 2021-05-15 PROCEDURE — 120N000001 HC R&B MED SURG/OB

## 2021-05-15 PROCEDURE — 250N000013 HC RX MED GY IP 250 OP 250 PS 637: Performed by: HOSPITALIST

## 2021-05-15 PROCEDURE — 97530 THERAPEUTIC ACTIVITIES: CPT | Mod: GP | Performed by: PHYSICAL THERAPIST

## 2021-05-15 PROCEDURE — 250N000011 HC RX IP 250 OP 636: Performed by: INTERNAL MEDICINE

## 2021-05-15 PROCEDURE — 97116 GAIT TRAINING THERAPY: CPT | Mod: GP | Performed by: PHYSICAL THERAPIST

## 2021-05-15 PROCEDURE — 99233 SBSQ HOSP IP/OBS HIGH 50: CPT | Performed by: HOSPITALIST

## 2021-05-15 RX ADMIN — ISOSORBIDE DINITRATE 10 MG: 10 TABLET ORAL at 08:54

## 2021-05-15 RX ADMIN — ACETAMINOPHEN 650 MG: 325 TABLET, FILM COATED ORAL at 20:41

## 2021-05-15 RX ADMIN — CARVEDILOL 12.5 MG: 12.5 TABLET, FILM COATED ORAL at 08:54

## 2021-05-15 RX ADMIN — LIDOCAINE 2 PATCH: 560 PATCH PERCUTANEOUS; TOPICAL; TRANSDERMAL at 20:42

## 2021-05-15 RX ADMIN — ACETAMINOPHEN 975 MG: 325 TABLET, FILM COATED ORAL at 08:54

## 2021-05-15 RX ADMIN — OMEPRAZOLE 20 MG: 20 CAPSULE, DELAYED RELEASE ORAL at 08:53

## 2021-05-15 RX ADMIN — DICLOFENAC SODIUM 2 G: 10 GEL TOPICAL at 16:01

## 2021-05-15 RX ADMIN — GABAPENTIN 200 MG: 100 CAPSULE ORAL at 20:41

## 2021-05-15 RX ADMIN — DICLOFENAC SODIUM 2 G: 10 GEL TOPICAL at 11:40

## 2021-05-15 RX ADMIN — DICLOFENAC SODIUM 2 G: 10 GEL TOPICAL at 08:57

## 2021-05-15 RX ADMIN — Medication 250 MG: at 20:42

## 2021-05-15 RX ADMIN — Medication 250 MG: at 08:53

## 2021-05-15 RX ADMIN — TRAMADOL HYDROCHLORIDE 50 MG: 50 TABLET, COATED ORAL at 08:54

## 2021-05-15 RX ADMIN — ISOSORBIDE DINITRATE 10 MG: 10 TABLET ORAL at 20:41

## 2021-05-15 RX ADMIN — CLONIDINE HYDROCHLORIDE 0.05 MG: 0.1 TABLET ORAL at 20:41

## 2021-05-15 RX ADMIN — ACETAMINOPHEN 975 MG: 325 TABLET, FILM COATED ORAL at 13:24

## 2021-05-15 RX ADMIN — CARVEDILOL 12.5 MG: 12.5 TABLET, FILM COATED ORAL at 19:31

## 2021-05-15 RX ADMIN — SERTRALINE HYDROCHLORIDE 100 MG: 100 TABLET ORAL at 08:54

## 2021-05-15 RX ADMIN — CLONIDINE HYDROCHLORIDE 0.05 MG: 0.1 TABLET ORAL at 08:54

## 2021-05-15 RX ADMIN — DICLOFENAC SODIUM 2 G: 10 GEL TOPICAL at 19:32

## 2021-05-15 RX ADMIN — LISINOPRIL 30 MG: 20 TABLET ORAL at 08:54

## 2021-05-15 RX ADMIN — TRAMADOL HYDROCHLORIDE 50 MG: 50 TABLET, COATED ORAL at 16:00

## 2021-05-15 RX ADMIN — ERTAPENEM SODIUM 1 G: 1 INJECTION, POWDER, LYOPHILIZED, FOR SOLUTION INTRAMUSCULAR; INTRAVENOUS at 16:01

## 2021-05-15 RX ADMIN — AMLODIPINE BESYLATE 10 MG: 10 TABLET ORAL at 08:53

## 2021-05-15 RX ADMIN — GABAPENTIN 200 MG: 100 CAPSULE ORAL at 08:54

## 2021-05-15 RX ADMIN — ACETAMINOPHEN 975 MG: 325 TABLET, FILM COATED ORAL at 00:26

## 2021-05-15 ASSESSMENT — ACTIVITIES OF DAILY LIVING (ADL)
ADLS_ACUITY_SCORE: 23
ADLS_ACUITY_SCORE: 18
ADLS_ACUITY_SCORE: 23

## 2021-05-15 NOTE — PLAN OF CARE
Presentation/Diagnosis: UTI, back pain  History: Macular degeneration, Lumbar compression fx, Recurrent uti, HTN  Labs/Protocols: WBC 7.3  Vitals: BP (!) 140/60 (BP Location: Left arm)   Pulse 57   Temp 97  F (36.1  C) (Temporal)   Resp 16   Wt 58.2 kg (128 lb 6.4 oz)   SpO2 93%   BMI 25.08 kg/m    Cardiac: WDL  Telemetry: None  Respiratory: LS clear, RA  Neuro: Forgetful, disoriented to place and situation   GI/: Inc of urine at times, voids frequently. Frequent small, formed BMs.   Skin: WOC following for blister on back. Wound care completed per orders this AM.   LDAs: PIV SL.   Diet: Reg  Activity: 1a gb/w. PT recommending TCU.   Pain:Back pain, Tramadol given with effect. Per pt, back pain is 25% improved from admission. Scheduled Tylenol.   Plan: SW following, referrals sent for TCU placement. Finishes last dose of IV abx today, will not need any abx at discharge per MD. ID consulted.

## 2021-05-15 NOTE — PROGRESS NOTES
SW spoke with patient and her son. Ideally they would like patient to discharge back to [facility] (they wont take over the weekend). They are ok with a referral being made for TCU.     Met with patient to discuss need for skilled nursing facility at discharge. The patient was given a list of skilled nursing facilities which includes the medicare.gov website for a comprehensive list of skilled nursing facilities.     Pt/family was given the Medicare Compare list for SNF, with associated star ratings to assist with choice for referrals/discharge planning Yes    Education was given to pt/family that star ratings are updated/maintained by Medicare and can be reviewed by visiting www.medicare.gov Yes    DOM faxed referral to [facility].     ROSE Gaviria [facility]  [phone]  [address]   [facility]. [id number]  [facility]

## 2021-05-15 NOTE — PLAN OF CARE
BP (!) 146/67   Pulse 59   Temp 96.6  F (35.9  C) (Temporal)   Resp 16   Wt 58.2 kg (128 lb 6.4 oz)   SpO2 92%   BMI 25.08 kg/m    Orientation: A&Ox4, forgetful  Resp: LS Clear, RA  Skin: Rash and Edema to right side of face.  Blister on Left back is covered with mepilex. Orders to not lift mepilex due to fragile skin but to do wound care every 3 days. Due 5/15  Pain: Managed with Tylenol, voltarin gel, and lido patches.  Activity: Ambulating has been an issue this shift as well as day shift, she has poor balance, pt states her legs feel very weak. PT evaluation today recommended TCU. We are using A1 /gb and walker to bedside commode.  Diet: tolerating regular  GI/: voiding spontaneously without difficulty, several loose stools on days and 3 loose stools on this shift, stool sample for c-diff pending and enteric precautions in place until c-diff negative.  Discharge Plan: will continue to monitor.

## 2021-05-15 NOTE — PLAN OF CARE
Noc RN- Pt forgetful, Up to commode with walker, unsteady & weak. Three small formed stools, Mepilex dressing intact to left lower back.   Vital signs stable, voiding wnl

## 2021-05-15 NOTE — PROGRESS NOTES
[facility]    Hospitalist Progress Note    Date of Service (when I saw the patient): 05/15/2021  Provider:  Jhoan Baltazar MD   Text Page  7am - 6PM       Assessment & Plan   Daya Ignacio is a 89 year old female with a past medical history of hypertension, depression, mild cognitive impairment, kidney stones, lumbar compression fractures, chronic pain, UTIs who presents with mid back pain.       #History of recurrent UTI, abnormal UA: UA is again abnormal with possible indication of infection.  She is likely chronically going to have abnormal UAs and is colonized.  She denies any dysuria or increased frequency of urination.  She was started on ciprofloxacin   --Urine culture now growing ESBL organism.  Ciprofloxacin not effective for that. Appreciate ID consult. Plan to treat this episode   --On Ertapenem now      #Mid-back pain-Patient continues to be afebrile, nontachycardic, nontachypneic with a normal white count. CT abdomen pelvis showed no obstructing ureteral calculi or hydronephrosis but nonobstructing intrarenal calculi.   CT did show chronic severe thoracic and lumbar compression fractures.  Given that this pain is worse with movement with CT scan not showing any evidence of obstructing nephrolithiasis, suspicion for musculoskeletal strain in the setting of chronic back pain from her compression fractures.    -Scheduled Tylenol, lidocaine patch.  Topicals.  Tramadol to 50 mg every 6 hours as needed.  PT consulted. Consulted orthotics given compression fractures.  -Patient son reports that she did have some increased left flank pain.  Urine culture now positive.  Also appears to have developed a rash on the flank area.  Question of shingles.  Will request ID consult      #Flank rash. Erythematous area on left flank noticed during this hospital stay. Appears to have a blister that ruptured. Better.   -- No obvious vesicles noted to suggest zoster, monitor closely   -- WOC nurse  consult   -- Does not appear like cellulitis although should be covered by ertapenem      #Possible radiologic early partial SBO vs ileus:   Resolved.     #HTN: Continue home coreg, lisinopril and imdur.  Resumed home amlodipine    #Depression: Continue home sertraline  #Known hx of left Adnexal cyst  # New onset of left hip pain. Difficulty ambulation and bearing weight, positive internal rotation maneuver eliciting pain. Assessment and treatment by PT requested.         DVT Prophylaxis: Pneumatic Compression Devices  Code Status: No CPR- Do NOT Intubate    Disposition: Expected discharge on Monday to [facility] with PT, they do not take patients on weekends.    Interval History   Denies pain in left hip, she is bearing weight with walker, ambulating. Back pain in better control.  Last does Ertapenem today.     -Data reviewed today: I reviewed all new labs and imaging results over the last 24 hours.     Physical Exam   Temp: 97  F (36.1  C) Temp src: Temporal BP: 127/53 Pulse: 66   Resp: 16 SpO2: 93 % O2 Device: None (Room air)    Vitals:    05/08/21 2351   Weight: 58.2 kg (128 lb 6.4 oz)     Vital Signs with Ranges  Temp:  [96.4  F (35.8  C)-97  F (36.1  C)] 97  F (36.1  C)  Pulse:  [57-66] 66  Resp:  [16-20] 16  BP: (119-176)/(51-67) 127/53  SpO2:  [92 %-96 %] 93 %  I/O last 3 completed shifts:  In: 1390 [P.O.:1390]  Out: -     GEN:  Alert, cooperates, appears comfortable, NAD.  HEENT: [place]. Normocephalic/atraumatic, no scleral icterus, no nasal discharge, mouth moist.  CV:  Regular rate and rhythm, no murmur or JVD.  S1 + S2 noted, no S3 or S4.  LUNGS:  Clear to auscultation bilaterally without rales/rhonchi/wheezing/retractions.  Symmetric chest rise on inhalation noted.  ABD:  Active bowel sounds, soft, non-tender/non-distended.  No rebound/guarding/rigidity. Left flank erythema and de roofed blisters.    EXT:  No edema or cyanosis.  No joint synovitis noted.  SKIN:  Dry to touch, no exanthems noted in the visualized  areas.       Medications       acetaminophen  975 mg Oral TID     amLODIPine  10 mg Oral Daily     carvedilol  12.5 mg Oral BID w/meals     cloNIDine  0.05 mg Oral BID     diclofenac  2 g Topical 4x Daily     ertapenem (INVanz) IV  1 g Intravenous Q24H     gabapentin  200 mg Oral BID     isosorbide dinitrate  10 mg Oral TID     lidocaine  2 patch Transdermal Q24h    And     lidocaine   Transdermal Q8H     lisinopril  30 mg Oral Daily     menthol   Transdermal Q8H     omeprazole  20 mg Oral Daily     saccharomyces boulardii  250 mg Oral BID     senna-docusate  1 tablet Oral At Bedtime     sertraline  100 mg Oral Daily       Data   Recent Labs   Lab 05/12/21  0751 05/09/21  0557 05/08/21  1939   WBC 7.3 8.0 8.3   HGB 10.0* 10.2* 10.3*   MCV 97 97 96    196 193    135 134   POTASSIUM 4.0 4.7 3.8   CHLORIDE 105 106 104   CO2 27 28 29   BUN 17 26 31*   CR 0.63 0.80 0.87   ANIONGAP 4 1* 1*   DAVE 9.1 9.0 9.0   GLC 93 93 105*       No results found for this or any previous visit (from the past 24 hour(s)).          Disclaimer: This note consists of symbols derived from keyboarding, dictation and/or voice recognition software. As a result, there may be errors in the script that have gone undetected. Please consider this when interpreting information found in this chart.

## 2021-05-16 ENCOUNTER — APPOINTMENT (OUTPATIENT)
Dept: PHYSICAL THERAPY | Facility: CLINIC | Age: 86
DRG: 690 | End: 2021-05-16
Payer: COMMERCIAL

## 2021-05-16 LAB
ANION GAP SERPL CALCULATED.3IONS-SCNC: 1 MMOL/L (ref 3–14)
BASOPHILS # BLD AUTO: 0 10E9/L (ref 0–0.2)
BASOPHILS NFR BLD AUTO: 0.5 %
BUN SERPL-MCNC: 19 MG/DL (ref 7–30)
CALCIUM SERPL-MCNC: 9 MG/DL (ref 8.5–10.1)
CHLORIDE SERPL-SCNC: 105 MMOL/L (ref 94–109)
CO2 SERPL-SCNC: 29 MMOL/L (ref 20–32)
CREAT SERPL-MCNC: 0.63 MG/DL (ref 0.52–1.04)
DIFFERENTIAL METHOD BLD: ABNORMAL
EOSINOPHIL # BLD AUTO: 0.2 10E9/L (ref 0–0.7)
EOSINOPHIL NFR BLD AUTO: 2.9 %
ERYTHROCYTE [DISTWIDTH] IN BLOOD BY AUTOMATED COUNT: 14 % (ref 10–15)
GFR SERPL CREATININE-BSD FRML MDRD: 79 ML/MIN/{1.73_M2}
GLUCOSE SERPL-MCNC: 95 MG/DL (ref 70–99)
HCT VFR BLD AUTO: 28.9 % (ref 35–47)
HGB BLD-MCNC: 9.5 G/DL (ref 11.7–15.7)
IMM GRANULOCYTES # BLD: 0.1 10E9/L (ref 0–0.4)
IMM GRANULOCYTES NFR BLD: 0.8 %
LYMPHOCYTES # BLD AUTO: 1.1 10E9/L (ref 0.8–5.3)
LYMPHOCYTES NFR BLD AUTO: 14.3 %
MCH RBC QN AUTO: 32 PG (ref 26.5–33)
MCHC RBC AUTO-ENTMCNC: 32.9 G/DL (ref 31.5–36.5)
MCV RBC AUTO: 97 FL (ref 78–100)
MONOCYTES # BLD AUTO: 0.7 10E9/L (ref 0–1.3)
MONOCYTES NFR BLD AUTO: 8.4 %
NEUTROPHILS # BLD AUTO: 5.7 10E9/L (ref 1.6–8.3)
NEUTROPHILS NFR BLD AUTO: 73.1 %
NRBC # BLD AUTO: 0 10*3/UL
NRBC BLD AUTO-RTO: 0 /100
PLATELET # BLD AUTO: 224 10E9/L (ref 150–450)
POTASSIUM SERPL-SCNC: 4.3 MMOL/L (ref 3.4–5.3)
RBC # BLD AUTO: 2.97 10E12/L (ref 3.8–5.2)
SODIUM SERPL-SCNC: 135 MMOL/L (ref 133–144)
WBC # BLD AUTO: 7.8 10E9/L (ref 4–11)

## 2021-05-16 PROCEDURE — 250N000011 HC RX IP 250 OP 636: Performed by: HOSPITALIST

## 2021-05-16 PROCEDURE — 250N000013 HC RX MED GY IP 250 OP 250 PS 637: Performed by: INTERNAL MEDICINE

## 2021-05-16 PROCEDURE — 250N000013 HC RX MED GY IP 250 OP 250 PS 637: Performed by: HOSPITALIST

## 2021-05-16 PROCEDURE — 120N000001 HC R&B MED SURG/OB

## 2021-05-16 PROCEDURE — 97530 THERAPEUTIC ACTIVITIES: CPT | Mod: GP | Performed by: PHYSICAL THERAPIST

## 2021-05-16 PROCEDURE — 36415 COLL VENOUS BLD VENIPUNCTURE: CPT | Performed by: HOSPITALIST

## 2021-05-16 PROCEDURE — 99233 SBSQ HOSP IP/OBS HIGH 50: CPT | Performed by: HOSPITALIST

## 2021-05-16 PROCEDURE — 80048 BASIC METABOLIC PNL TOTAL CA: CPT | Performed by: HOSPITALIST

## 2021-05-16 PROCEDURE — 85025 COMPLETE CBC W/AUTO DIFF WBC: CPT | Performed by: HOSPITALIST

## 2021-05-16 RX ORDER — ERTAPENEM 1 G/1
1 INJECTION, POWDER, LYOPHILIZED, FOR SOLUTION INTRAMUSCULAR; INTRAVENOUS EVERY 24 HOURS
Status: DISCONTINUED | OUTPATIENT
Start: 2021-05-16 | End: 2021-05-20

## 2021-05-16 RX ADMIN — CARVEDILOL 12.5 MG: 12.5 TABLET, FILM COATED ORAL at 08:29

## 2021-05-16 RX ADMIN — ISOSORBIDE DINITRATE 10 MG: 10 TABLET ORAL at 08:29

## 2021-05-16 RX ADMIN — Medication 250 MG: at 08:30

## 2021-05-16 RX ADMIN — DICLOFENAC SODIUM 2 G: 10 GEL TOPICAL at 21:03

## 2021-05-16 RX ADMIN — GABAPENTIN 200 MG: 100 CAPSULE ORAL at 08:30

## 2021-05-16 RX ADMIN — GABAPENTIN 200 MG: 100 CAPSULE ORAL at 20:55

## 2021-05-16 RX ADMIN — Medication 250 MG: at 20:55

## 2021-05-16 RX ADMIN — CLONIDINE HYDROCHLORIDE 0.05 MG: 0.1 TABLET ORAL at 20:55

## 2021-05-16 RX ADMIN — ERTAPENEM SODIUM 1 G: 1 INJECTION, POWDER, LYOPHILIZED, FOR SOLUTION INTRAMUSCULAR; INTRAVENOUS at 14:13

## 2021-05-16 RX ADMIN — TRAMADOL HYDROCHLORIDE 50 MG: 50 TABLET, COATED ORAL at 20:55

## 2021-05-16 RX ADMIN — ISOSORBIDE DINITRATE 10 MG: 10 TABLET ORAL at 20:55

## 2021-05-16 RX ADMIN — DICLOFENAC SODIUM 2 G: 10 GEL TOPICAL at 16:44

## 2021-05-16 RX ADMIN — AMLODIPINE BESYLATE 10 MG: 10 TABLET ORAL at 08:30

## 2021-05-16 RX ADMIN — ACETAMINOPHEN 975 MG: 325 TABLET, FILM COATED ORAL at 08:31

## 2021-05-16 RX ADMIN — ACETAMINOPHEN 975 MG: 325 TABLET, FILM COATED ORAL at 00:55

## 2021-05-16 RX ADMIN — ACETAMINOPHEN 975 MG: 325 TABLET, FILM COATED ORAL at 13:52

## 2021-05-16 RX ADMIN — DICLOFENAC SODIUM 2 G: 10 GEL TOPICAL at 13:03

## 2021-05-16 RX ADMIN — ISOSORBIDE DINITRATE 10 MG: 10 TABLET ORAL at 13:52

## 2021-05-16 RX ADMIN — DICLOFENAC SODIUM 2 G: 10 GEL TOPICAL at 08:43

## 2021-05-16 RX ADMIN — CARVEDILOL 12.5 MG: 12.5 TABLET, FILM COATED ORAL at 18:46

## 2021-05-16 RX ADMIN — CLONIDINE HYDROCHLORIDE 0.05 MG: 0.1 TABLET ORAL at 08:29

## 2021-05-16 RX ADMIN — OMEPRAZOLE 20 MG: 20 CAPSULE, DELAYED RELEASE ORAL at 08:30

## 2021-05-16 RX ADMIN — SERTRALINE HYDROCHLORIDE 100 MG: 100 TABLET ORAL at 08:29

## 2021-05-16 RX ADMIN — LISINOPRIL 30 MG: 20 TABLET ORAL at 08:30

## 2021-05-16 ASSESSMENT — ACTIVITIES OF DAILY LIVING (ADL)
ADLS_ACUITY_SCORE: 19
ADLS_ACUITY_SCORE: 18
ADLS_ACUITY_SCORE: 18
ADLS_ACUITY_SCORE: 23

## 2021-05-16 NOTE — PLAN OF CARE
Noc RN- Pt awake frequently, ambulates to BE with walker/SBA. Pt frustrated with going to BR. Pt c/o lower backpain, Scheduled tylenol given.

## 2021-05-16 NOTE — PLAN OF CARE
Pt Alert and forgetful. Pt does at times see things that are not there or say something like an elephant was in the room. Her Son thought she was hallucinating. Pt does have frequent voiding. Bladder scan this afternoon after voiding 50 ml and PVR only had 50 ml. Encouraged increased fluid intact. VSS. Afebrile. RA. LS clear. +bs/flatus. No BM this shift. Up with assist of 1 gait belt and walker. Not as steady on feed and very slow moving. Pain rated between 6 and 9 out of 10. Pain managed with Lidocaine patche, Voltaren gel, Tylenol. Pt declined Ultram. MD assessed patient, talked with RN, PT and Son. Labs were drawn and were fine. Md did start IV Invanz to start again. Plan is TCU 1-2 days if medically stable. Will continue to monitor.

## 2021-05-16 NOTE — PLAN OF CARE
BP (!) 143/59 (BP Location: Left arm)   Pulse 59   Temp 97.8  F (36.6  C)   Resp 16   Wt 58.2 kg (128 lb 6.4 oz)   SpO2 94%   BMI 25.08 kg/m    Orientation: A&Ox4, forgetful  Resp: LS Clear, RA  Skin: Rash to face and Edema to right side of face, Blister to left back is covered with mepilex wound care complete on days dressing CDI, however pain has increased in that area this evening as well as edema.  Pain: Managed with Ultarm and Tylenol  Activity: A1, walker/gb, weakness has improved  Diet: Regular, poor appetite  Voiding: frequency  Discharge Plan: TCU

## 2021-05-16 NOTE — PROGRESS NOTES
05/16/21 1100   Signing Clinician's Name / Credentials   Signing clinician's name / credentials Tram Mancia, JAYY   Quick Adds   Rehab Discipline PT   Therapeutic Activity   Minutes of Treatment 40 minutes   Symptoms Noted During/After Treatment Fatigue   Treatment Detail Increased time assessing strength and coodination today as per notes decline on the Thursday of this week, going from Mod I with 230 feet of ambulation to needing A x 2. Today pt equal strength, besides R hip flexion 3+/5 otherwise 4/5 and equal. Pt intact cranial nerves. Pt did have dysmetia with finger to nose bilaterally. Pt was cued for sit to stand, needing mod A for transition. Pt was cued for ambulation 20 feet. Pt bradykinseic, slow step to pattern. Pt becoming more fatigued with walking and urgently needing to sit down on bed, too low. Safely min A x 1. Pt was cued for scooting up to HOB. Pt was cued for return to supine, pt needing mod A for performance. Pt son also reporting her having hallucination of elephants out of window this am.    PT Discharge Planning    PT Discharge Recommendation (DC Rec) Transitional Care Facility   PT Rationale for DC Rec Pt needing mod A for transfers and bed mob, pt has limited ambulation to this date.    PT Brief overview of current status  Mod A   Additional Documentation   Rehab Comments poor eyesight   PT Plan progress ambulation, balance   Total Session Time   Total Session Time (minutes) 40 minutes

## 2021-05-16 NOTE — PROGRESS NOTES
[facility]    Hospitalist Progress Note    Date of Service (when I saw the patient): 05/16/2021  Provider:  Jhoan Baltazar MD   Text Page  7am - 6PM       Assessment & Plan   Daya Ignacio is a 89 year old female with a past medical history of hypertension, depression, mild cognitive impairment, kidney stones, lumbar compression fractures, chronic pain, UTIs who presents with mid back pain.       #History of recurrent UTI, abnormal UA: UA is again abnormal with possible indication of infection.  She is likely chronically going to have abnormal UAs and is colonized.  She denies any dysuria or increased frequency of urination.  She was started on ciprofloxacin   --Urine culture now growing ESBL organism.  Ciprofloxacin not effective for that. Appreciate ID consult. Plan to treat this episode   --On Ertapenem for now      #Mid-back pain-Patient continues to be afebrile, nontachycardic, nontachypneic with a normal white count. CT abdomen pelvis showed no obstructing ureteral calculi or hydronephrosis but nonobstructing intrarenal calculi.   CT did show chronic severe thoracic and lumbar compression fractures.  Given that this pain is worse with movement with CT scan not showing any evidence of obstructing nephrolithiasis, suspicion for musculoskeletal strain in the setting of chronic back pain from her compression fractures.    -Scheduled Tylenol, lidocaine patch.  Topicals.  Tramadol to 50 mg every 6 hours as needed.  PT consulted. Consulted orthotics given compression fractures.  -Patient son reports that she did have some increased left flank pain.  Urine culture now positive.  Also appears to have developed a rash on the flank area.  Question of shingles.  Will request ID consult      #Flank rash. Erythematous area on left flank noticed during this hospital stay. Appears to have a blister that ruptured. Better.   -- No obvious vesicles noted to suggest zoster but shingles is not excluded  "\"The concept that some atypical pain syndromes may be related to herpes zoster without rash, or \"zoster sine herpete,\" has been raised. Clinical data paired with serologic and polymerase chain reaction (PCR) evidence of concurrent VZV reactivation support this theory\"      monitor closely   -- [facility] nurse consult   -- Does not appear like cellulitis although should be covered by ertapenem      #Possible radiologic early partial SBO vs ileus:   Resolved.     #HTN: Continue home coreg, lisinopril and imdur.  Resumed home amlodipine    #Depression: Continue home sertraline  #Known hx of left Adnexal cyst  # New onset of left hip pain. Difficulty ambulation and bearing weight, positive internal rotation maneuver eliciting pain. Assessment and treatment by PT requested. Resolved        DVT Prophylaxis: Pneumatic Compression Devices  Code Status: No CPR- Do NOT Intubate    Disposition: Expected discharge on Monday to [facility] with PT, they do not take patients on weekends.   Case reviewed at large and in details with her son. I tried to answer all his question as best as I could. My personal impresion is that her deconditionin is heavly axing her and her best option at this point is PT    Interval History   Son is in the room. He reports she has visual hallucination of an elephant in the room. She did not sleep much per report d/t urinary frequency. Ertapenem discontinue yst after her last dose art 16 hours, so she is effectively covered until now and I am going to resume it due to frequency, however if she were having HORACE because of infection it would be resistance to Ertapenem!. Back pain in better control, she can sit up in bed without help       -Data reviewed today: I reviewed all new labs and imaging results over the last 24 hours.     Physical Exam   Temp: 97.2  F (36.2  C) Temp src: Temporal BP: 123/51 Pulse: 61   Resp: 20 SpO2: 94 % O2 Device: None (Room air)    Vitals:    05/08/21 2351   Weight: 58.2 kg (128 lb 6.4 oz) "     Vital Signs with Ranges  Temp:  [96.5  F (35.8  C)-97.8  F (36.6  C)] 97.2  F (36.2  C)  Pulse:  [59-67] 61  Resp:  [16-20] 20  BP: (123-184)/(51-77) 123/51  SpO2:  [93 %-95 %] 94 %  I/O last 3 completed shifts:  In: 540 [P.O.:540]  Out: 50 [Urine:50]    GEN:  Alert, cooperates, appears comfortable, NAD.  HEENT: [place]. Normocephalic/atraumatic, no scleral icterus, no nasal discharge, mouth moist.  CV:  Regular rate and rhythm, no murmur or JVD.  S1 + S2 noted, no S3 or S4.  LUNGS:  Clear to auscultation bilaterally without rales/rhonchi/wheezing/retractions.  Symmetric chest rise on inhalation noted.  ABD:  Active bowel sounds, soft, non-tender/non-distended.  No rebound/guarding/rigidity. Left flank erythema and de roofed blisters.    EXT:  No edema or cyanosis.  No joint synovitis noted.  SKIN:  Dry to touch, no exanthems noted in the visualized areas.       Medications       acetaminophen  975 mg Oral TID     amLODIPine  10 mg Oral Daily     carvedilol  12.5 mg Oral BID w/meals     cloNIDine  0.05 mg Oral BID     diclofenac  2 g Topical 4x Daily     ertapenem (INVanz) IV  1 g Intravenous Q24H     gabapentin  200 mg Oral BID     isosorbide dinitrate  10 mg Oral TID     lidocaine  2 patch Transdermal Q24h    And     lidocaine   Transdermal Q8H     lisinopril  30 mg Oral Daily     menthol   Transdermal Q8H     omeprazole  20 mg Oral Daily     saccharomyces boulardii  250 mg Oral BID     senna-docusate  1 tablet Oral At Bedtime     sertraline  100 mg Oral Daily       Data   Recent Labs   Lab 05/16/21  1209 05/12/21  0751   WBC 7.8 7.3   HGB 9.5* 10.0*   MCV 97 97    205    136   POTASSIUM 4.3 4.0   CHLORIDE 105 105   CO2 29 27   BUN 19 17   CR 0.63 0.63   ANIONGAP 1* 4   DAVE 9.0 9.1   GLC 95 93       No results found for this or any previous visit (from the past 24 hour(s)).          Disclaimer: This note consists of symbols derived from keyboarding, dictation and/or voice recognition software. As a  result, there may be errors in the script that have gone undetected. Please consider this when interpreting information found in this chart.

## 2021-05-17 LAB
BACTERIA SPEC CULT: NO GROWTH
BACTERIA SPEC CULT: NO GROWTH
LABORATORY COMMENT REPORT: NORMAL
SARS-COV-2 RNA RESP QL NAA+PROBE: NEGATIVE
SPECIMEN SOURCE: NORMAL

## 2021-05-17 PROCEDURE — 87635 SARS-COV-2 COVID-19 AMP PRB: CPT | Performed by: HOSPITALIST

## 2021-05-17 PROCEDURE — G0463 HOSPITAL OUTPT CLINIC VISIT: HCPCS

## 2021-05-17 PROCEDURE — 120N000001 HC R&B MED SURG/OB

## 2021-05-17 PROCEDURE — 250N000013 HC RX MED GY IP 250 OP 250 PS 637: Performed by: HOSPITALIST

## 2021-05-17 PROCEDURE — 250N000011 HC RX IP 250 OP 636: Performed by: HOSPITALIST

## 2021-05-17 PROCEDURE — 99233 SBSQ HOSP IP/OBS HIGH 50: CPT | Performed by: HOSPITALIST

## 2021-05-17 PROCEDURE — 250N000013 HC RX MED GY IP 250 OP 250 PS 637: Performed by: INTERNAL MEDICINE

## 2021-05-17 RX ADMIN — DICLOFENAC SODIUM 2 G: 10 GEL TOPICAL at 16:50

## 2021-05-17 RX ADMIN — CLONIDINE HYDROCHLORIDE 0.05 MG: 0.1 TABLET ORAL at 08:59

## 2021-05-17 RX ADMIN — Medication 250 MG: at 21:41

## 2021-05-17 RX ADMIN — OMEPRAZOLE 20 MG: 20 CAPSULE, DELAYED RELEASE ORAL at 09:01

## 2021-05-17 RX ADMIN — AMLODIPINE BESYLATE 10 MG: 10 TABLET ORAL at 08:59

## 2021-05-17 RX ADMIN — SERTRALINE HYDROCHLORIDE 100 MG: 100 TABLET ORAL at 08:59

## 2021-05-17 RX ADMIN — Medication 250 MG: at 08:59

## 2021-05-17 RX ADMIN — GABAPENTIN 200 MG: 100 CAPSULE ORAL at 09:02

## 2021-05-17 RX ADMIN — ISOSORBIDE DINITRATE 10 MG: 10 TABLET ORAL at 14:15

## 2021-05-17 RX ADMIN — ACETAMINOPHEN 975 MG: 325 TABLET, FILM COATED ORAL at 09:02

## 2021-05-17 RX ADMIN — DICLOFENAC SODIUM 2 G: 10 GEL TOPICAL at 09:23

## 2021-05-17 RX ADMIN — CARVEDILOL 12.5 MG: 12.5 TABLET, FILM COATED ORAL at 18:13

## 2021-05-17 RX ADMIN — ACETAMINOPHEN 975 MG: 325 TABLET, FILM COATED ORAL at 14:15

## 2021-05-17 RX ADMIN — CARVEDILOL 12.5 MG: 12.5 TABLET, FILM COATED ORAL at 09:01

## 2021-05-17 RX ADMIN — CLONIDINE HYDROCHLORIDE 0.05 MG: 0.1 TABLET ORAL at 21:41

## 2021-05-17 RX ADMIN — DICLOFENAC SODIUM 2 G: 10 GEL TOPICAL at 13:03

## 2021-05-17 RX ADMIN — ISOSORBIDE DINITRATE 10 MG: 10 TABLET ORAL at 21:41

## 2021-05-17 RX ADMIN — ERTAPENEM SODIUM 1 G: 1 INJECTION, POWDER, LYOPHILIZED, FOR SOLUTION INTRAMUSCULAR; INTRAVENOUS at 14:15

## 2021-05-17 RX ADMIN — DICLOFENAC SODIUM 2 G: 10 GEL TOPICAL at 21:40

## 2021-05-17 RX ADMIN — ISOSORBIDE DINITRATE 10 MG: 10 TABLET ORAL at 08:59

## 2021-05-17 RX ADMIN — LISINOPRIL 30 MG: 20 TABLET ORAL at 09:00

## 2021-05-17 RX ADMIN — MENTHOL 1 PATCH: 205.5 PATCH TOPICAL at 22:25

## 2021-05-17 RX ADMIN — ENOXAPARIN SODIUM 40 MG: 40 INJECTION SUBCUTANEOUS at 13:01

## 2021-05-17 RX ADMIN — DOCUSATE SODIUM 50 MG AND SENNOSIDES 8.6 MG 1 TABLET: 8.6; 5 TABLET, FILM COATED ORAL at 21:40

## 2021-05-17 RX ADMIN — GABAPENTIN 200 MG: 100 CAPSULE ORAL at 21:40

## 2021-05-17 ASSESSMENT — ACTIVITIES OF DAILY LIVING (ADL)
ADLS_ACUITY_SCORE: 18

## 2021-05-17 NOTE — PROGRESS NOTES
[facility]    Hospitalist Progress Note  Name: Daya Ignacio    MRN: [id number]  Provider:  German Hillman DO, MPH  Date of Service: 05/17/2021    Summary of Stay: Daya Ignacio is a 89 year old female with a history of hypertension, depression, cognitive impairment, nephrolithiasis, lumbar compression fractures, chronic pain, recurrent UTI admitted on 5/8/2021 with back pain.  Urinalysis again abnormal and urine culture growing Aerococcus and ESBL E. coli.  Has been on ertapenem and infectious disease following.  CT shows no evidence of obstructing ureteral calculi or hydronephrosis.  There is a left flank rash of unclear significance.  It does appear consistent with a large blister that has since ruptured.  Topical products are being provided and WOC following.  Pain has been intermittent and showing gradual improvement with topical analgesics.  This morning was found to have urinary retention which is being addressed with straight catheterizations.  Ultimate plan for TCU on discharge in the next 1 to 2 days.    Problem List:   1. Aerococcus and ESBL E. coli UTI: Appreciate infectious disease consultation.  Currently on ertapenem.  No plans to discharge with antibiotics given potential for colonization and asymptomatic bacteriuria.  Unfortunately has had issues with recurrent UTIs chronically abnormal urinalysis in the past.  2. Mid back pain: Continues to be intermittent but overall showing gradual improvement.  CT shows no obstructing ureteral calculi nor hydronephrosis but there is nonobstructing intrarenal calculi.  She has chronic severe thoracic and lumbar compression fractures.  Overall suspect musculoskeletal pain.  Continue topical analgesics, scheduled acetaminophen, and low-dose tramadol as needed.  3. Left flank rash: Per previous documentation appears to be a blister that has since ruptured.  Will request WOC evaluation.  I did not see the early stages of this wound but at this  time does not appear consistent with zoster.  Will defer to infectious disease if this requires further evaluation with regards to the potential previous zoster infection.  4. Possible early partial SBO versus ileus: Only seen on imaging.  Nothing clinical to support this at this time.  Having bowel movements and no abdominal pain.  5. Hypertension: Blood pressure reasonable.  Continue carvedilol, lisinopril, Imdur, amlodipine.  6. Depression: Continue sertraline.  7. Chronic adnexal cyst: Outpatient evaluation/monitoring.  8. New onset left hip pain: Difficulty with ambulation.  Continue PT.  Apparently now resolved.  9. Urinary retention: Continue routine straight caths.    DVT Prophylaxis: Enoxaparin (Lovenox) SQ  Code Status: No CPR- Do NOT Intubate  Diet: Regular Diet Adult    Almanza Catheter: not present  Disposition: Expected discharge in 1-2 days to TCU. Goals prior to discharge include work up as above.   Incidental Findings: As above.  Family updated today: Called son and left brief voicemail.     Interval History   Assumed care from previous hospitalist. The history was fully reviewed.  The patient reports doing well and improving pain. Urinary retention 500 ml this AM. No chest pain or shortness of breath. No abdominal pain. Still some mid to low back discomfort but better. No nausea, vomiting, diarrhea, constipation. No fevers. No other specific complaints identified.     -Data reviewed today: I personally reviewed all new labs and imaging results over the last 24 hours.     Physical Exam   Temp: 98.3  F (36.8  C) Temp src: Temporal BP: (!) 177/69 Pulse: 68   Resp: 16 SpO2: 94 % O2 Device: None (Room air)    Vitals:    05/08/21 2351   Weight: 58.2 kg (128 lb 6.4 oz)     Vital Signs with Ranges  Temp:  [97.8  F (36.6  C)-98.3  F (36.8  C)] 98.3  F (36.8  C)  Pulse:  [61-74] 68  Resp:  [16-20] 16  BP: (123-177)/(51-82) 177/69  SpO2:  [94 %-95 %] 94 %  I/O last 3 completed shifts:  In: 870 [P.O.:870]  Out:  50 [Urine:50]    GENERAL: No apparent distress. Awake, alert, and fully oriented.  HEENT: Normocephalic, atraumatic. Extraocular movements intact.  CARDIOVASCULAR: Regular rate and rhythm without murmurs or rubs. No S3.  PULMONARY: Clear bilaterally.  GASTROINTESTINAL: Soft, non-tender, non-distended. Bowel sounds normoactive.   EXTREMITIES: No cyanosis or clubbing. No edema.  NEUROLOGICAL: CN 2-12 grossly intact, no focal neurological deficits.  DERMATOLOGICAL: No rash, ulcer, bruising, nor jaundice.     Medications       acetaminophen  975 mg Oral TID     amLODIPine  10 mg Oral Daily     carvedilol  12.5 mg Oral BID w/meals     cloNIDine  0.05 mg Oral BID     diclofenac  2 g Topical 4x Daily     ertapenem (INVanz) IV  1 g Intravenous Q24H     gabapentin  200 mg Oral BID     isosorbide dinitrate  10 mg Oral TID     lisinopril  30 mg Oral Daily     menthol   Transdermal Q8H     omeprazole  20 mg Oral Daily     saccharomyces boulardii  250 mg Oral BID     senna-docusate  1 tablet Oral At Bedtime     sertraline  100 mg Oral Daily     Data     Laboratory:  Recent Labs   Lab 05/16/21  1209 05/12/21  0751   WBC 7.8 7.3   HGB 9.5* 10.0*   HCT 28.9* 31.7*   MCV 97 97    205     Recent Labs   Lab 05/16/21  1209 05/12/21  0751    136   POTASSIUM 4.3 4.0   CHLORIDE 105 105   CO2 29 27   ANIONGAP 1* 4   GLC 95 93   BUN 19 17   CR 0.63 0.63   GFRESTIMATED 79 79   GFRESTBLACK >90 >90   DAVE 9.0 9.1     Recent Labs   Lab 05/11/21  1613 05/11/21  1607   CULT No growth No growth       Imaging:  No results found for this or any previous visit (from the past 24 hour(s)).      German Hillman DO MPH  [facility] Hospitalist  [address] [place] [address]  05/17/2021

## 2021-05-17 NOTE — PROVIDER NOTIFICATION
Page to Dr. German Zimmer  Pt is retaining urine. bladder scan 516 ml voided 100ML. would you like to please place order for patient to be straight cath if needed.?

## 2021-05-17 NOTE — PROGRESS NOTES
[facility] Nurse Inpatient Wound Assessment   Reason for consultation: Evaluate and treat  Left back wound    Assessment  Left back wounds due to possible Trauma (patient denies falling or bumping into this area) vs unknown etiology  Status: improving  Now just with one open area remaining, surrounding blisters now resolved  Treatment Plan  Left back wound: Every 3 days   1. Cleanse with wound cleanser and dry  2. Apply Mepilex 4x4  3. Do not lift between changes to assess as patient has fragile skin   Orders Updated  Recommended provider order: None, at this time  WO Nurse follow-up plan:weekly  Nursing to notify the Provider(s) and re-consult the WO Nurse if wound(s) deteriorates or new skin concern.    Patient History  According to provider note(s):  Daya Ignacio is a 89 year old female with a past medical history of hypertension, depression, mild cognitive impairment, kidney stones, lumbar compression fractures, chronic pain, UTIs who presents with mid back pain.     Objective Data    Active Diet Order  Orders Placed This Encounter      Regular Diet Adult      Output:   I/O last 3 completed shifts:  In: 870 [P.O.:870]  Out: 50 [Urine:50]    Risk Assessment:   Sensory Perception: 3-->slightly limited  Moisture: 3-->occasionally moist  Activity: 3-->walks occasionally  Mobility: 3-->slightly limited  Nutrition: 2-->probably inadequate  Friction and Shear: 3-->no apparent problem  Manuel Score: 17                          Labs:   Recent Labs   Lab 05/16/21  1209   HGB 9.5*   WBC 7.8       Physical Exam  Areas of skin assessed: focused Left back    Wound Location:  Left back  Date of last photo 5/17/21  Wound History: Patient denies falling or bumping into area at all.    Wound Base: 7x3.5cm agranular wound     Palpation of the wound bed: normal      Drainage: small     Description of drainage: serous     Measurements (length x width x depth, in cm) see above      Tunneling N/A     Undermining N/A  Periwound skin:  intact      Color: pink      Temperature: normal   Odor: none  Pain: mild    Interventions  Visual inspection and assessment completed   Wound Care Rationale Improve absorptive capacity and Promote moist wound healing without tissue dehydration   Wound Care: done per plan of care  Supplies: gathered and placed at the bedside  Current off-loading measures: Pillows under calves and Pillows  Current support surface: Standard  Atmos Air mattress  Education provided to: plan of care  Discussed plan of care with Patient and Nurse    Carlos Tillman RN CWOCN

## 2021-05-17 NOTE — PLAN OF CARE
BP (!) 168/82   Pulse 72   Temp 97.8  F (36.6  C)   Resp 16   Wt 58.2 kg (128 lb 6.4 oz)   SpO2 95%   BMI 25.08 kg/m    Orientation: Disorientated to place and time, forgetful.  Resp: LS Clear, RA  Skin: Rash to face and Edema to right side of face, Blister to left back is covered with mepilex wound care complete on days dressing CDI, however pain has increased in that area this evening as well as edema. Pt describes the pain as burning.  Pain: Managed with Ultarm and Tylenol  Activity: A1, walker/gb, weakness has improved  Diet: Regular, poor appetite  GI/: Urinary frequency is better, soft incontinence stool x1.  Discharge Plan: TCU

## 2021-05-17 NOTE — PLAN OF CARE
Patient alert and oriented but forgetful at times. Slept much of the night, declined the need for PRN pain medication. Up with assist of 1 and walker to the bathroom. Wound on left flank is covered, no drainage noted. PT/OT/WOC/ID following, plan is to discharge to TCU when able to.

## 2021-05-17 NOTE — PROGRESS NOTES
.Care Management Follow Up    Length of Stay (days): 9    Expected Discharge Date: per discussion today with MD, discharge planning for TCU on Tuesday or Wednesday.     Patient plan of care discussed at interdisciplinary rounds: Yes    Anticipated Discharge Disposition:  [facility]     Anticipated Discharge Services:  therapy  Anticipated Discharge DME:  To be determined at TCU    Patient/family educated on Medicare website which has current facility and service quality ratings:  Yes  Education Provided on the Discharge Plan:  Yes  Patient/Family in Agreement with the Plan:  Yes    Referrals Placed by CM/SW:  Rehab facility, transportation  Private pay costs discussed: transportation costs. Son has asked that medical transport be arranged for pt's tranfser to TCU.. discussed with him that the w/c transport billing will go to pt's insurance however that SW would be unable to guarantee coverage, private pay cost of $78/base and $5/mi which was acknowledged and accepted.     Additional Information:    Discussed with son the MD determination the planning per MD for pt's discharge of Tuesday or Wednesday. Gael identifies [facility] as the facility of preference as pt has been there before, referral was made over the weekend to [facility]. Assessment completed, [facility], they are  able to accept pt tomorrow if she is medically stable for discharge, semi-private room, insurance authorization is in progress.       Plan:   Will await MD determination of discharge date, insurance authorization. For planning, [facility] has been arranged for 5/18 @ 1415 however depending on MD determination and insurance. Son has kun updated regarding plan as noted, DOM will continue contact with pt/son tomorrow for continued discharge planning.       .    ROSE Jon   Inpatient Care Coordination   [facility]     [phone]

## 2021-05-17 NOTE — PLAN OF CARE
PT: attempted pt this afternoon, pt asleep. Despite repeated encouragements to participate, pt refusing stating she was too tired. Will continue to follow pt.

## 2021-05-17 NOTE — PROGRESS NOTES
[facility]  Infectious Disease Progress Note          Assessment and Plan:   IMPRESSION:     1.  A 89-year-old female, well known to me from multiple prior admissions with complicated infection and medical history, now admitted with back pain, left flank discomfort, maybe slight cognition changes and possible small bowel obstruction, not perceived to be infection related, but historically has looked similar with prior UTI- related infections including the pain, so not entirely excluded that this is the actual clinical event here.  2.  ESBL positive urine culture with grossly abnormal urine, colonized chronically, but also with major recurrent infection, so hard to exclude completely that this is true infection.  3.  History of chronically colonized urine, prior stones including obstruction and multiple prior major infections including candidemia and bacteremias in the past.  4.  Prior C. diff, but not since 2019.  5.  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  6.  Bilateral total hip arthroplasties as a threatened location, but no prior infections.  7.  Mild chronic renal dysfunction.  8.  LISTED PENICILLIN AND NITROFURANTOIN ALLERGIES.  WE HAVE GIVEN HER SULFA BEFORE WHEN FORCED WHICH LISTED AS ALLERGIC, BUT THIS IS A FAMILY HISTORY OF THAT, NOT A PERSONAL HISTORY.     RECOMMENDATIONS:     1.  Difficult to know if infection issue however, adding everything together, certainly would treat at this point.  We will start ertapenem and plan on at least a short course.  2.  Get blood cultures before we start ertapenem.  Historically, in very similar circumstances, she has actually been bacteremic when this is occurring, has not had any effective agents against the organism yet, so would still likely find the organism on the Cipro she is on.  Obviously, discontinue Cipro.  3.  Previously addressed the overall picture.  There has been a large amount of interest in trying to do prophylactic  antibiotics, but they have no role to play here whatsoever.  She has highly resistant organisms, so despite getting recurrent sepsis and major infections, prophylaxis is not indicated and counterproductive.  In addition, the problematic long term situation here is whether to treat her asymptomatic bacteriuria given that she has had multiple recurrent major infections.  Usual practice is best not to treat her when it is truly asymptomatic bacteriuria; that does not mean, of course, here that asymptomatic bacteriuria is a benign condition.  It certainly is a threat of bacteremia/ hips, etc.  4 Back same and unlikely kidney/infection related presenting issue, day 7 erta now, ?here longer continue, but if disposition ready OK to stop and just do the short course she has had, if here to 10 days then stop  Rash and wd back, not clear ? Does not look like zoster           Interval History:   no new complaints and doing well except pain; no cp, sob, n/v/d, or abd pain. Back pain about same no fever or fever sxs              Medications:       acetaminophen  975 mg Oral TID     amLODIPine  10 mg Oral Daily     carvedilol  12.5 mg Oral BID w/meals     cloNIDine  0.05 mg Oral BID     diclofenac  2 g Topical 4x Daily     enoxaparin ANTICOAGULANT  40 mg Subcutaneous Q24H     ertapenem (INVanz) IV  1 g Intravenous Q24H     gabapentin  200 mg Oral BID     isosorbide dinitrate  10 mg Oral TID     lisinopril  30 mg Oral Daily     menthol   Transdermal Q8H     omeprazole  20 mg Oral Daily     saccharomyces boulardii  250 mg Oral BID     senna-docusate  1 tablet Oral At Bedtime     sertraline  100 mg Oral Daily                  Physical Exam:   Blood pressure (!) 177/69, pulse 68, temperature 98.3  F (36.8  C), temperature source Temporal, resp. rate 16, weight 58.2 kg (128 lb 6.4 oz), SpO2 94 %, not currently breastfeeding.  Wt Readings from Last 2 Encounters:   05/08/21 58.2 kg (128 lb 6.4 oz)   11/18/20 57.6 kg (127 lb)     Vital  Signs with Ranges  Temp:  [97.8  F (36.6  C)-98.3  F (36.8  C)] 98.3  F (36.8  C)  Pulse:  [64-74] 68  Resp:  [16-20] 16  BP: (144-177)/(55-82) 177/69  SpO2:  [94 %-95 %] 94 %    Constitutional: Awake, alert, cooperative, no apparent distress   Lungs: Clear to auscultation bilaterally, no crackles or wheezing   Cardiovascular: Regular rate and rhythm, normal S1 and S2, and no murmur noted   Abdomen: Normal bowel sounds, soft, non-distended, non-tender   Skin: No rashes, no cyanosis, no edema   Other:           Data:   All microbiology laboratory data reviewed.  Recent Labs   Lab Test 05/16/21  1209 05/12/21  0751 05/09/21  0557   WBC 7.8 7.3 8.0   HGB 9.5* 10.0* 10.2*   HCT 28.9* 31.7* 31.9*   MCV 97 97 97    205 196     Recent Labs   Lab Test 05/16/21  1209 05/12/21  0751 05/09/21  0557   CR 0.63 0.63 0.80     No lab results found.  Recent Labs   Lab Test 05/11/21  1613 05/11/21  1607 05/09/21  0120 11/02/20  0819 11/02/20  0817 10/31/20  1130 10/31/20  0941 10/31/20  0938 05/09/20  0944   CULT No growth No growth >100,000 colonies/mL  Aerococcus urinae  Identification obtained by MALDI-TOF mass spectrometry research use only database. Test   characteristics determined and verified by the [facility]   ([facility]) [place], [place].  Aerococcus is usually susceptible to penicillin, cephalosporins, tetracycline and   vancomycin.  *  >100,000 colonies/mL  Escherichia coli ESBL  ESBL (extended beta lactamase) producing organisms require contact precautions.  * No growth No growth <10,000 colonies/mL  Proteus mirabilis  *  <10,000 colonies/mL  Enterococcus faecium  *  <10,000 colonies/mL  Strain 2  Enterococcus faecium  *  <10,000 colonies/mL  Candida glabrata complex  Susceptibility testing not routinely done  *  ID requested by Dr. Camejo on 11.2.20 at 1311. bw Cultured on the 1st day of incubation:  Candida glabrata complex  *  Critical Value/Significant Value, preliminary result  only, called to and read back by  Elke Peña RN at 0155 11.2.20. amd   Cultured on the 1st day of incubation:  Candida glabrata complex  *  Critical Value/Significant Value, preliminary result only, called to and read back by  Elke Peña RN 11/2/20 @ 0250 TF    Susceptibility testing done on previous specimen No growth

## 2021-05-17 NOTE — PLAN OF CARE
Pt A&O to self and forgetful. She couldn't remember the date and place. Hypertensive BP this am. Other VSS afebrile Ra. LS expiratory wheezing at times this morning. Left back wound has not drainage and mepilex placed on it. WOC said it looked better today. Numbness to BLE. Pt was unable to void today st cath done for 650ML out at 1408 DTV now. Tolerated regular diet. LBM 5/16/2021. Up with assist of 1 gb/walker. IV INvanz continued. TCU discharge was cancelled for today. Will be 1-2 days until discharge to TCU. Will continue to monitor

## 2021-05-18 PROCEDURE — 250N000013 HC RX MED GY IP 250 OP 250 PS 637: Performed by: INTERNAL MEDICINE

## 2021-05-18 PROCEDURE — 99222 1ST HOSP IP/OBS MODERATE 55: CPT | Performed by: NURSE PRACTITIONER

## 2021-05-18 PROCEDURE — 99207 PR CONSULT E&M CHANGED TO INITIAL LEVEL: CPT | Performed by: NURSE PRACTITIONER

## 2021-05-18 PROCEDURE — 250N000013 HC RX MED GY IP 250 OP 250 PS 637: Performed by: HOSPITALIST

## 2021-05-18 PROCEDURE — 250N000013 HC RX MED GY IP 250 OP 250 PS 637: Performed by: NURSE PRACTITIONER

## 2021-05-18 PROCEDURE — 250N000011 HC RX IP 250 OP 636: Performed by: HOSPITALIST

## 2021-05-18 PROCEDURE — 99233 SBSQ HOSP IP/OBS HIGH 50: CPT | Performed by: HOSPITALIST

## 2021-05-18 PROCEDURE — 120N000001 HC R&B MED SURG/OB

## 2021-05-18 RX ORDER — GABAPENTIN 300 MG/1
300 CAPSULE ORAL AT BEDTIME
Status: DISCONTINUED | OUTPATIENT
Start: 2021-05-18 | End: 2021-05-22 | Stop reason: HOSPADM

## 2021-05-18 RX ORDER — LIDOCAINE 4 G/G
2 PATCH TOPICAL
Status: DISCONTINUED | OUTPATIENT
Start: 2021-05-18 | End: 2021-05-22 | Stop reason: HOSPADM

## 2021-05-18 RX ORDER — GABAPENTIN 100 MG/1
200 CAPSULE ORAL DAILY
Status: DISCONTINUED | OUTPATIENT
Start: 2021-05-19 | End: 2021-05-20

## 2021-05-18 RX ADMIN — LIDOCAINE 2 PATCH: 560 PATCH PERCUTANEOUS; TOPICAL; TRANSDERMAL at 20:42

## 2021-05-18 RX ADMIN — OMEPRAZOLE 20 MG: 20 CAPSULE, DELAYED RELEASE ORAL at 10:06

## 2021-05-18 RX ADMIN — SERTRALINE HYDROCHLORIDE 100 MG: 100 TABLET ORAL at 10:06

## 2021-05-18 RX ADMIN — ACETAMINOPHEN 975 MG: 325 TABLET, FILM COATED ORAL at 13:47

## 2021-05-18 RX ADMIN — GABAPENTIN 300 MG: 300 CAPSULE ORAL at 21:36

## 2021-05-18 RX ADMIN — Medication 250 MG: at 20:40

## 2021-05-18 RX ADMIN — DICLOFENAC SODIUM 4 G: 10 GEL TOPICAL at 18:15

## 2021-05-18 RX ADMIN — ISOSORBIDE DINITRATE 10 MG: 10 TABLET ORAL at 10:18

## 2021-05-18 RX ADMIN — ISOSORBIDE DINITRATE 10 MG: 10 TABLET ORAL at 13:47

## 2021-05-18 RX ADMIN — Medication: at 20:41

## 2021-05-18 RX ADMIN — AMLODIPINE BESYLATE 10 MG: 10 TABLET ORAL at 10:18

## 2021-05-18 RX ADMIN — CLONIDINE HYDROCHLORIDE 0.05 MG: 0.1 TABLET ORAL at 10:19

## 2021-05-18 RX ADMIN — TRAMADOL HYDROCHLORIDE 50 MG: 50 TABLET, COATED ORAL at 16:40

## 2021-05-18 RX ADMIN — ISOSORBIDE DINITRATE 10 MG: 10 TABLET ORAL at 20:40

## 2021-05-18 RX ADMIN — ACETAMINOPHEN 975 MG: 325 TABLET, FILM COATED ORAL at 01:36

## 2021-05-18 RX ADMIN — Medication 250 MG: at 10:07

## 2021-05-18 RX ADMIN — LISINOPRIL 30 MG: 20 TABLET ORAL at 10:18

## 2021-05-18 RX ADMIN — CARVEDILOL 12.5 MG: 12.5 TABLET, FILM COATED ORAL at 18:15

## 2021-05-18 RX ADMIN — Medication: at 18:16

## 2021-05-18 RX ADMIN — MENTHOL 1 PATCH: 205.5 PATCH TOPICAL at 07:06

## 2021-05-18 RX ADMIN — CARVEDILOL 12.5 MG: 12.5 TABLET, FILM COATED ORAL at 10:18

## 2021-05-18 RX ADMIN — GABAPENTIN 200 MG: 100 CAPSULE ORAL at 10:07

## 2021-05-18 RX ADMIN — ERTAPENEM SODIUM 1 G: 1 INJECTION, POWDER, LYOPHILIZED, FOR SOLUTION INTRAMUSCULAR; INTRAVENOUS at 13:47

## 2021-05-18 RX ADMIN — ACETAMINOPHEN 975 MG: 325 TABLET, FILM COATED ORAL at 10:07

## 2021-05-18 RX ADMIN — ENOXAPARIN SODIUM 40 MG: 40 INJECTION SUBCUTANEOUS at 13:47

## 2021-05-18 RX ADMIN — TRAMADOL HYDROCHLORIDE 50 MG: 50 TABLET, COATED ORAL at 10:08

## 2021-05-18 RX ADMIN — DICLOFENAC SODIUM 4 G: 10 GEL TOPICAL at 20:42

## 2021-05-18 RX ADMIN — CLONIDINE HYDROCHLORIDE 0.05 MG: 0.1 TABLET ORAL at 20:40

## 2021-05-18 RX ADMIN — DICLOFENAC SODIUM 2 G: 10 GEL TOPICAL at 10:20

## 2021-05-18 ASSESSMENT — ACTIVITIES OF DAILY LIVING (ADL)
ADLS_ACUITY_SCORE: 18

## 2021-05-18 NOTE — PROGRESS NOTES
.Care Management Follow Up    Length of Stay (days): 10    Expected Discharge Date: 05/20/21     Concerns to be Addressed:       Patient plan of care discussed at interdisciplinary rounds: Yes    Anticipated Discharge Disposition:  [facility], pending MD determination of discharge date, and insurance authorization yet pending at this time.        Plan:     As noted above, will arrange for medical transport.         .    ROSE Jon   Inpatient Care Coordination   [facility]     [phone]

## 2021-05-18 NOTE — PLAN OF CARE
Noc RN- Pt resting most of this shift, irritable at times, Pt too weak to ambulate to BR uses bedside commode. Pt verbalizes no pain, scheduled tylenol. B/p 178/55..

## 2021-05-18 NOTE — PLAN OF CARE
Vital signs stable.  Lungs diminished in bases.  Ambulated with walker, gait belt and assist of 1.  Voided, bm this evening.  Foam dressing to left posterior back intact, redness improving.  On iv Invanz,  Pain controlled with icy hot patch and diclofenac liniment.  Resting comfortably in bed, slept on and off this shift.

## 2021-05-18 NOTE — CONSULTS
[facility]  Pain Service Consultation   Text Page      Assessment & Plan   Daya Ignacio is a 89 year old female who was admitted on 5/8/2021. I was asked by Dr German Hillman to see the patient for uncontrolled back pain.    PLAN:   1)  Opioids:  -Continue with tramadol 50 mg every 6 hours PRN  Opioids Treatment Goal:   -Improvement in function  -Participate in PT    2)Non-opioid multimodal medication therapy  -Topical:Voltaren 1% Topical Gel 4 times daily, Lidocaine Patch 4% 2 patches at HS, bengay vanishing cream 4 times daily  -N-SAIDS: could consider low dose toradol while inpatient if pain continues to be a concern  -Muscle Relaxants:None indicated  -Adjuvants:Acetaminophen 975 mg every 8 hours , Gabapentin increased to 300 mg at HS and 200 mg daily.- if patient tolerates the increase and still has pain would increase to 300 mg BID  -Antidepresants/anxiolytics:Sertraline 100 mg daily as previously on    3)  Non-medication interventions  Positioning, Heating pad PRN, ICE, Relaxation    4)  Constipation Prophylaxis  Continue to Monitor, Bowel Meds PRN per Constipation Order Set, Senna-S 1 tablet at bedtime    5)  Pain Education  -Opioid safe use, storage and disposal information included in DC AVS    6)  DC Planning   Discussed goal of Opioid therapy as above with patient and nursing staff  Length of therapy is less than 10 days, opioids may be stopped without taper.  Continued outpatient management of pain per PCP  Disposition: TBD likely TCU  Support systems: family  Outpatient Referrals: none at this time  The following risk factors have been identified for unintentional overdose: patient is > 65 years old. Discharge with intra-nasal naloxone if discharged to home with opioids  >40 mg MME/day.  Plan for education prior to discharge.    ASSESSMENT:  1)  Acute on chronic back pain s/p compression fractures     2)  Patient with chronic back pain, not on chronic opioids  Baseline 0 mg Daily  "Morphine Equivalent as dispensed and as reported daily use.  Patient has no expected opioid tolerance.     Patient's opioid use in past 24 hours: 0  = 0 mg Daily Morphine Equivalent    3)  Risk factors for opioid related harms  -Sleep disordered breathing  - Age > 65 years old    4)  Opioid induced side-effects:  -Constipation   -Sedation    5)  Other/Related:    -Deconditioning    Primary Care Physician   Primary Care Physician:DINORAH DISLA  Pain Specialist: none    Chief Complaint   Back pain    History is obtained from the patient  Electronic medical record    History of Present Illness   Daya Ignacio is a 89 year old female with a past medical history of HTN, depression, mild cognitive impairment, history of chronic pain, lumbar compression fractures, history of kidney stones. who presents with back pain and UTI. Patient stated she has back pain that is chronic however this has been exacerbated by her hospitalization. She reports she does not take opioids at her baseline. Per her  she is only on gabapentin, she reports nothing is helping at this moment, per the nursing record the tramadol has been helping.     CURRENT PAIN:  Her pain is located in the middle of her back  It is described as Aching, Sharp and Shooting  She rates it as ranging between 10/10 and 10/10  The average is 10/10 on a scale of 0-10  Currently it is rated as 10/10  It improves by \"nothing\"  It worsens by \"moving\"  She has been compliant with the recommendations while in the hospital.    PAST PAIN TREATMENT:   heat, ice, gabepentin    [place] Board of Pharmacy Data Base Reviewed:    YES; As expected, no concern for misuse/abuse of controlled medications based on this report.      Time Spent on this Encounter   Total unit/floor time 69 minutes, time consisted of the following, examination of the patient, reviewing the record and completing documentation. >50% of time spent in counseling and coordination of care.  Time spend counseling " with patient consisted of the following topics, symptom management.  Time spent in coordination of care with Bedside Nurse Ninfa and Hospitalist Dr Hillman.     Isabel ANGELES CNP  Pain Management and Palliative Care  [facility]  Pgr: [phone]    Past Medical History   I have reviewed this patient's medical history and updated it with pertinent information if needed.   Past Medical History:   Diagnosis Date     Anemia      Anxiety      Arthritis      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 4/16/2014     Depression      h/o Clostridium difficile colitis 06/2019     History of blood transfusion      Hypertension      Kidney stone      Lumbar compression fracture (H)      Macular degeneration (senile) of retina, unspecified      Mild cognitive impairment      Mumps      Other chronic pain     low back     Recurrent UTI      Upper GI bleed 06/2019     Urinary tract infection due to extended-spectrum beta lactamase (ESBL)-producing Klebsiella 05/2019    resistant to Bactrim       Past Surgical History   I have reviewed this patient's surgical history and updated it with pertinent information if needed.  Past Surgical History:   Procedure Laterality Date     Cataract removal NOS Bilateral      COMBINED CYSTOSCOPY, INSERT STENT URETER(S) Left 5/6/2020    Procedure: Cystoscopy with left ureteral stent insertion;  Surgeon: Gaurav Munson MD;  Location:  OR     CYSTOSCOPY N/A 8/1/2019    Procedure: Exam under anesthesia, video cystopanendoscopy;  Surgeon: Dennis Carlson MD;  Location:  OR     ESOPHAGOSCOPY, GASTROSCOPY, DUODENOSCOPY (EGD), COMBINED N/A 6/11/2019    Procedure: ESOPHAGOGASTRODUODENOSCOPY (EGD);  Surgeon: Gaurav Degroot MD;  Location:  GI     EXTRACORPOREAL SHOCK WAVE LITHOTRIPSY (ESWL) Left 5/28/2020    Procedure: LEFT EXTRACORPOREAL SHOCK WAVE LITHOTRIPSY;  Surgeon: Gaurav Munson MD;  Location:  OR     Hip Pinning procedure Left      Hip  replacement NOS Right      LASER HOLMIUM LITHOTRIPSY URETER(S), INSERT STENT, COMBINED Left 10/30/2020    Procedure: Cystoscopy, left ureteral stent exchange, left retrograde pyelogram, interpretation of fluoroscopic images, left ureteroscopy with holmium lithotripsy and stone basketing;  Surgeon: Gaurav Munson MD;  Location: [facility] OR     OPEN REDUCTION INTERNAL FIXATION TIBIAL PLATEAU Right 8/28/2019    Procedure: Open reduction internal fixation right proximal tibia fracture;  Surgeon: Molina Pardo MD;  Location: [facility] OR     TONSILLECTOMY, ADENOIDECTOMY, COMBINED         Prior to Admission Medications   Prior to Admission Medications   Prescriptions Last Dose Informant Patient Reported? Taking?   Calcium Carbonate (CALCIUM 600 PO)   Yes Yes   Sig: Take 1 tablet by mouth 2 times daily    Cholecalciferol (VITAMIN D3) 2000 UNITS CAPS   Yes Yes   Sig: Take 2,000 Units by mouth daily (with dinner)   Multiple Vitamins-Minerals (PRESERVISION AREDS) CAPS   Yes Yes   Sig: Take 1 capsule by mouth 2 times daily    SENNA-docusate sodium (SENNA S) 8.6-50 MG tablet   No Yes   Sig: Take 1 tablet by mouth At Bedtime   acetaminophen (TYLENOL) 500 MG tablet   Yes Yes   Sig: Take 500 mg by mouth 3 times daily as needed for mild pain   amLODIPine (NORVASC) 10 MG tablet   Yes Yes   Sig: Take 10 mg by mouth daily   carvedilol (COREG) 25 MG tablet   Yes Yes   Sig: Take 12.5 mg by mouth every morning    carvedilol (COREG) 25 MG tablet   Yes Yes   Sig: Take 25 mg by mouth At Bedtime   cloNIDine (CATAPRES) 0.1 MG tablet   Yes Yes   Sig: Take 0.05 mg by mouth 2 times daily    diclofenac (VOLTAREN) 1 % topical gel   Yes Yes   Sig: Place 2 g onto the skin 3 times daily as needed for moderate pain Apply to right knee   docusate sodium (COLACE) 100 MG capsule   No Yes   Sig: Take 1 capsule (100 mg) by mouth At Bedtime   gabapentin (NEURONTIN) 100 MG capsule   No Yes   Sig: Take 2 capsules (200 mg) by mouth 2 times daily    isosorbide dinitrate (ISORDIL) 10 MG tablet   No Yes   Sig: Take 1 tablet (10 mg) by mouth 3 times daily   lisinopril (ZESTRIL) 30 MG tablet   Yes Yes   Sig: Take 30 mg by mouth daily   omeprazole (PRILOSEC) 20 MG DR capsule   Yes Yes   Sig: Take 20 mg by mouth daily    polyethylene glycol (MIRALAX/GLYCOLAX) packet   No Yes   Sig: Take 17 g by mouth daily as needed for constipation   potassium chloride ER (KLOR-CON M) 10 MEQ CR tablet   Yes Yes   Sig: Take 10 mEq by mouth daily   sertraline (ZOLOFT) 100 MG tablet   Yes Yes   Sig: Take 100 mg by mouth daily   vitamin C (ASCORBIC ACID) 500 MG tablet   Yes Yes   Sig: Take 500 mg by mouth daily at 4pm       Facility-Administered Medications: None     Allergies   Allergies   Allergen Reactions     Atorvastatin Muscle Pain (Myalgia)     Macrobid [Nitrofurantoin Anhydrous] Other (See Comments)     Body aches     Nitrofurantoin Muscle Pain (Myalgia) and Unknown     Body aches       Penicillins Unknown     Seasonal Allergies Unknown     Sulfa Drugs Other (See Comments)     Tolerated Bactrim May 2019  Entire family has allergy to Sulfa     Sulfasalazine Unknown       Social History   I have reviewed this patient's social history and updated it with pertinent information if needed. Daya Ignacio  reports that she has quit smoking. Her smoking use included cigarettes. She has never used smokeless tobacco. She reports that she does not drink alcohol or use drugs.    Family History   I have reviewed this patient's family history and updated it with pertinent information if needed.       Review of Systems   The 10 point Review of Systems is negative other than noted in the HPI or here.  Back pain   Denies Bowel or bladder dysfunction    Physical Exam   Temp:  [97.5  F (36.4  C)-97.7  F (36.5  C)] 97.7  F (36.5  C)  Pulse:  [63-97] 68  Resp:  [14-16] 16  BP: (138-178)/(53-68) 178/55  SpO2:  [93 %-94 %] 94 %  128 lbs 6.4 oz  Exam:  GEN:  Alert, oriented x 3, NAD.  HEENT:   Normocephalic/atraumatic, no scleral icterus, no nasal discharge, mouth moist.  CV:  RRR, S1, S2; no murmurs or other irregularities noted.  RESP:  Clear to auscultation bilaterally without rales/rhonchi/wheezing/retractions.    ABD:  Rounded, soft, non-tender/non-distended.  +BS   SKIN:  Dry to touch, no exanthems noted in the visualized areas.    NEURO: Symmetric strength +5/5.  Sensation to touch intact all extremities.   There is no area of allodynia or hyperesthesia.  PAIN BEHAVIOR: Cooperative  Psych:  Normal affect.  Calm, cooperative, conversant appropriately.      Data   Results for orders placed or performed during the hospital encounter of 05/08/21 (from the past 24 hour(s))   Asymptomatic SARS-CoV-2 COVID-19 Virus (Coronavirus) by PCR    Specimen: Nasopharyngeal   Result Value Ref Range    SARS-CoV-2 Virus Specimen Source Nasal     SARS-CoV-2 PCR Result NEGATIVE     SARS-CoV-2 PCR Comment (Note)

## 2021-05-18 NOTE — PROGRESS NOTES
[facility]  Infectious Disease Progress Note          Assessment and Plan:   IMPRESSION:     1.  A 89-year-old female, well known to me from multiple prior admissions with complicated infection and medical history, now admitted with back pain, left flank discomfort, maybe slight cognition changes and possible small bowel obstruction, not perceived to be infection related, but historically has looked similar with prior UTI- related infections including the pain, so not entirely excluded that this is the actual clinical event here.  2.  ESBL positive urine culture with grossly abnormal urine, colonized chronically, but also with major recurrent infection, so hard to exclude completely that this is true infection.  3.  History of chronically colonized urine, prior stones including obstruction and multiple prior major infections including candidemia and bacteremias in the past.  4.  Prior C. diff, but not since 2019.  5.  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  6.  Bilateral total hip arthroplasties as a threatened location, but no prior infections.  7.  Mild chronic renal dysfunction.  8.  LISTED PENICILLIN AND NITROFURANTOIN ALLERGIES.  WE HAVE GIVEN HER SULFA BEFORE WHEN FORCED WHICH LISTED AS ALLERGIC, BUT THIS IS A FAMILY HISTORY OF THAT, NOT A PERSONAL HISTORY.     RECOMMENDATIONS:     1.  Difficult to know if infection issue however, adding everything together, certainly would treat at this point.  We will start ertapenem and plan on at least a short course.  2.  Get blood cultures before we start ertapenem.  Historically, in very similar circumstances, she has actually been bacteremic when this is occurring, has not had any effective agents against the organism yet, so would still likely find the organism on the Cipro she is on.  Obviously, discontinue Cipro.  3.  Previously addressed the overall picture.  There has been a large amount of interest in trying to do prophylactic  antibiotics, but they have no role to play here whatsoever.  She has highly resistant organisms, so despite getting recurrent sepsis and major infections, prophylaxis is not indicated and counterproductive.  In addition, the problematic long term situation here is whether to treat her asymptomatic bacteriuria given that she has had multiple recurrent major infections.  Usual practice is best not to treat her when it is truly asymptomatic bacteriuria; that does not mean, of course, here that asymptomatic bacteriuria is a benign condition.  It certainly is a threat of bacteremia/ hips, etc.  4 Back same and unlikely kidney/infection related presenting issue, day 8 erta now, ?here longer any way so continue, but if disposition ready OK to stop , if here to 10 days then stop  Rash and wd back, not clear ? Does not look like zoster or infection, not likely the pain issue           Interval History:   no new complaints and doing well except pain; no cp, sob, n/v/d, or abd pain. Back pain about same no fever or fever sxs              Medications:       acetaminophen  975 mg Oral TID     amLODIPine  10 mg Oral Daily     carvedilol  12.5 mg Oral BID w/meals     cloNIDine  0.05 mg Oral BID     diclofenac  4 g Topical 4x Daily     enoxaparin ANTICOAGULANT  40 mg Subcutaneous Q24H     ertapenem (INVanz) IV  1 g Intravenous Q24H     [START ON 5/19/2021] gabapentin  200 mg Oral Daily     gabapentin  300 mg Oral At Bedtime     isosorbide dinitrate  10 mg Oral TID     lidocaine  2 patch Transdermal Q24h    And     lidocaine   Transdermal Q8H     lisinopril  30 mg Oral Daily     menthol (Topical Analgesic) 2.5%   Topical 4x Daily     omeprazole  20 mg Oral Daily     saccharomyces boulardii  250 mg Oral BID     senna-docusate  1 tablet Oral At Bedtime     sertraline  100 mg Oral Daily                  Physical Exam:   Blood pressure (!) 178/55, pulse 68, temperature 97.7  F (36.5  C), temperature source Temporal, resp. rate 16,  weight 58.2 kg (128 lb 6.4 oz), SpO2 94 %, not currently breastfeeding.  Wt Readings from Last 2 Encounters:   05/08/21 58.2 kg (128 lb 6.4 oz)   11/18/20 57.6 kg (127 lb)     Vital Signs with Ranges  Temp:  [97.5  F (36.4  C)-97.7  F (36.5  C)] 97.7  F (36.5  C)  Pulse:  [63-97] 68  Resp:  [14-16] 16  BP: (138-178)/(53-68) 178/55  SpO2:  [93 %-94 %] 94 %    Constitutional: Awake, alert, cooperative, no apparent distress   Lungs: Clear to auscultation bilaterally, no crackles or wheezing   Cardiovascular: Regular rate and rhythm, normal S1 and S2, and no murmur noted   Abdomen: Normal bowel sounds, soft, non-distended, non-tender   Skin: No rashes, no cyanosis, no edema   Other:           Data:   All microbiology laboratory data reviewed.  Recent Labs   Lab Test 05/16/21  1209 05/12/21  0751 05/09/21  0557   WBC 7.8 7.3 8.0   HGB 9.5* 10.0* 10.2*   HCT 28.9* 31.7* 31.9*   MCV 97 97 97    205 196     Recent Labs   Lab Test 05/16/21  1209 05/12/21  0751 05/09/21  0557   CR 0.63 0.63 0.80     No lab results found.  Recent Labs   Lab Test 05/11/21  1613 05/11/21  1607 05/09/21  0120 11/02/20  0819 11/02/20  0817 10/31/20  1130 10/31/20  0941 10/31/20  0938 05/09/20  0944   CULT No growth No growth >100,000 colonies/mL  Aerococcus urinae  Identification obtained by MALDI-TOF mass spectrometry research use only database. Test   characteristics determined and verified by the [facility]   ([facility]) [place], [place].  Aerococcus is usually susceptible to penicillin, cephalosporins, tetracycline and   vancomycin.  *  >100,000 colonies/mL  Escherichia coli ESBL  ESBL (extended beta lactamase) producing organisms require contact precautions.  * No growth No growth <10,000 colonies/mL  Proteus mirabilis  *  <10,000 colonies/mL  Enterococcus faecium  *  <10,000 colonies/mL  Strain 2  Enterococcus faecium  *  <10,000 colonies/mL  Candida glabrata complex  Susceptibility testing not routinely  done  *  ID requested by Dr. Camejo on 11.2.20 at 1311. bw Cultured on the 1st day of incubation:  Candida glabrata complex  *  Critical Value/Significant Value, preliminary result only, called to and read back by  Elke Peña RN at 0155 11.2.20. amd   Cultured on the 1st day of incubation:  Candida glabrata complex  *  Critical Value/Significant Value, preliminary result only, called to and read back by  Elke Peña RN 11/2/20 @ 0250 TF    Susceptibility testing done on previous specimen No growth

## 2021-05-18 NOTE — PROGRESS NOTES
CLINICAL NUTRITION SERVICES - REASSESSMENT NOTE    Recommendations Ordered by Registered Dietitian (RD):   Continue diet as ordered   Ordered Ensure Enlive BID with meals    Malnutrition:   % Weight Loss:  None noted  % Intake:  Decreased intake does not meet criteria for malnutrition   Subcutaneous Fat Loss:  Upper arm region mild depletion --> will not use given only one indicator  Muscle Loss:  Temporal region mild-moderate depletion, Clavicle bone region mild depletion, Acromion bone region mild depletion, Anterior thigh region mild depletion and Posterior calf region mid depletion  Fluid Retention:  trace    Malnutrition Diagnosis: Patient does not meet two of the above criteria necessary for diagnosing malnutrition     EVALUATION OF PROGRESS TOWARD GOALS   Diet: Regular Diet     Intake/Tolerance: Upon review of the flowsheets, pt with variable PO intake throughout admission, mainly consuming % of meals however a few instances of 25-50%. Ordering meals consistently TID. Pt states that her appetite is poor however she is consuming three meals per day.     ASSESSED NUTRITION NEEDS:  Dosing Weight 58 kg   Estimated Energy Needs: 25-30 Kcal/Kg  Justification: maintenance  Estimated Protein Needs: 1.2-1.5 g pro/Kg  Justification: preservation of lean body mass, advanced age  Estimated Fluid Needs: per MD    NEW FINDINGS:   - WOC following, left back wounds d/t possible trauma   - weight: No new weights to comment on   Vitals:    05/08/21 2351   Weight: 58.2 kg (128 lb 6.4 oz)     - labs:    Electrolytes  Potassium (mmol/L)   Date Value   05/16/2021 4.3   05/12/2021 4.0   05/09/2021 4.7     Phosphorus (mg/dL)   Date Value   07/15/2019 5.5 (H)   06/24/2011 4.1   06/23/2011 4.4    Blood Glucose  Glucose (mg/dL)   Date Value   05/16/2021 95   05/12/2021 93   05/09/2021 93   05/08/2021 105 (H)   11/03/2020 111 (H)     Hemoglobin A1C (%)   Date Value   01/13/2017 5.3   08/08/2015 5.2    Inflammatory Markers  CRP  Inflammation (mg/L)   Date Value   05/11/2020 21.5 (H)   05/10/2020 67.2 (H)   05/09/2020 182.0 (H)     WBC (10e9/L)   Date Value   05/16/2021 7.8   05/12/2021 7.3   05/09/2021 8.0     Albumin (g/dL)   Date Value   11/03/2020 2.6 (L)   05/06/2020 3.7   08/21/2019 3.0 (L)      Magnesium (mg/dL)   Date Value   08/29/2019 1.7   08/26/2019 1.8   08/01/2019 1.5 (L)     Sodium (mmol/L)   Date Value   05/16/2021 135   05/12/2021 136   05/09/2021 135    Renal  Urea Nitrogen (mg/dL)   Date Value   05/16/2021 19   05/12/2021 17   05/09/2021 26     Creatinine (mg/dL)   Date Value   05/16/2021 0.63   05/12/2021 0.63   05/09/2021 0.80     Additional  Triglycerides (mg/dL)   Date Value   02/19/2019 95   02/05/2018 92   01/13/2017 94     Ketones Urine (mg/dL)   Date Value   05/09/2021 Trace (A)        - medications:    acetaminophen  975 mg Oral TID     amLODIPine  10 mg Oral Daily     carvedilol  12.5 mg Oral BID w/meals     cloNIDine  0.05 mg Oral BID     diclofenac  2 g Topical 4x Daily     enoxaparin ANTICOAGULANT  40 mg Subcutaneous Q24H     ertapenem (INVanz) IV  1 g Intravenous Q24H     gabapentin  200 mg Oral BID     isosorbide dinitrate  10 mg Oral TID     lisinopril  30 mg Oral Daily     menthol   Transdermal Q8H     omeprazole  20 mg Oral Daily     saccharomyces boulardii  250 mg Oral BID     senna-docusate  1 tablet Oral At Bedtime     sertraline  100 mg Oral Daily         acetaminophen, acetaminophen, melatonin, menthol **AND** menthol, naloxone **OR** naloxone **OR** naloxone **OR** naloxone, ondansetron **OR** ondansetron, polyethylene glycol, traMADol     Previous Goals:   Patient to consume at least 75% of meals TID.  Evaluation: Not met    Previous Nutrition Diagnosis:   Predicted inadequate nutrient intake (energy/protein) related to potential for decline in PO intakes during admit, pending LOS.  Evaluation: No change    MALNUTRITION  % Weight Loss:  None noted  % Intake:  Decreased intake does not meet criteria  for malnutrition   Subcutaneous Fat Loss:  Upper arm region mild depletion --> will not use given only one indicator  Muscle Loss:  Temporal region mild-moderate depletion, Clavicle bone region mild depletion, Acromion bone region mild depletion, Anterior thigh region mild depletion and Posterior calf region mid depletion  Fluid Retention:  trace    Malnutrition Diagnosis: Patient does not meet two of the above criteria necessary for diagnosing malnutrition    CURRENT NUTRITION DIAGNOSIS  Predicted inadequate nutrient intake (energy/protein) related to potential for decline in PO intakes during admit, pending LOS.    INTERVENTIONS  Recommendations / Nutrition Prescription  Continue diet as ordered   Ordered Ensure Enlive BID with meals     Implementation  Medical Food Supplement: as above    Goals  Patient to consume at least 75% of meals TID.    MONITORING AND EVALUATION:  Progress towards goals will be monitored and evaluated per protocol and Practice Guidelines    Xochitl Ng RD, LD  Clinical Dietitian

## 2021-05-18 NOTE — PLAN OF CARE
Pt A&O, forgetful at times and setting off bed alarm.  Up SBA with gb/walker. Multiple small BMs this shift, softly formed.  Pain managed with po tramadol and bengay/voltaren gel.  IV invanz.  MRI order placed for chronic back pain, checklist sent.  Will discharge to TCU when medically ready.

## 2021-05-18 NOTE — PROGRESS NOTES
[facility]    Hospitalist Progress Note  Name: Daya Ignacio    MRN: [id number]  Provider:  German Hillman DO, MPH  Date of Service: 05/18/2021    Summary of Stay: Daya Ignacio is a 89 year old female with a history of hypertension, depression, cognitive impairment, nephrolithiasis, lumbar compression fractures, chronic pain, recurrent UTI admitted on 5/8/2021 with back pain.  Urinalysis again abnormal and urine culture growing Aerococcus and ESBL E. coli.  Has been on ertapenem and infectious disease following.  CT shows no evidence of obstructing ureteral calculi or hydronephrosis.  There is a left flank rash of unclear significance.  It does appear consistent with a large blister that has since ruptured.  Topical products are being provided and WOC following.  Pain has been intermittent and showing gradual improvement with topical analgesics.  This morning was found to have urinary retention which is being addressed with straight catheterizations.  Ultimate plan for TCU on discharge in the next 1 to 2 days.    Problem List:   1. Aerococcus and ESBL E. coli UTI: Appreciate infectious disease consultation.  Currently on ertapenem.  No plans to discharge with antibiotics given potential for colonization and asymptomatic bacteriuria.  Unfortunately has had issues with recurrent UTIs chronically abnormal urinalysis in the past.  2. Mid back pain: Continues to be intermittent but overall showing gradual improvement.  CT shows no obstructing ureteral calculi nor hydronephrosis but there is nonobstructing intrarenal calculi.  She has chronic severe thoracic and lumbar compression fractures.  Overall suspect musculoskeletal pain.  Continue topical analgesics, scheduled acetaminophen, and low-dose tramadol as needed.  Her pain remains debilitating so check thoracic and lumbar spine MRI.  Will request pain management consultation as well.  3. Left flank rash: Per previous documentation appears to be a  blister that has since ruptured.  Will request [facility] evaluation.  I did not see the early stages of this wound but at this time does not appear consistent with zoster.  Will defer to infectious disease if this requires further evaluation with regards to the potential previous zoster infection.  4. Possible early partial SBO versus ileus: Only seen on imaging.  Nothing clinical to support this at this time.  Having bowel movements and no abdominal pain.  5. Hypertension: Blood pressure reasonable.  Continue carvedilol, lisinopril, Imdur, amlodipine.  6. Depression: Continue sertraline.  7. Chronic adnexal cyst: Outpatient evaluation/monitoring.  8. New onset left hip pain: Difficulty with ambulation.  Continue PT.  Apparently now resolved.  9. Urinary retention: Continue routine straight caths.    DVT Prophylaxis: Enoxaparin (Lovenox) SQ  Code Status: No CPR- Do NOT Intubate  Diet: Regular Diet Adult    Almanza Catheter: not present  Disposition: Expected discharge in 1-2 days to TCU. Goals prior to discharge include work up as above.   Incidental Findings: As above.  Family updated today: Yes, discussed with son Gael by phone.     Interval History   The patient reports doing well except continued mid back pain. No chest pain or shortness of breath. No nausea, vomiting, diarrhea, constipation. No fevers. No other specific complaints identified.     -Data reviewed today: I personally reviewed all new labs and imaging results over the last 24 hours.     Physical Exam   Temp: 97.7  F (36.5  C) Temp src: Temporal BP: (!) 178/55 Pulse: 68   Resp: 16 SpO2: 94 % O2 Device: None (Room air)    Vitals:    05/08/21 2351   Weight: 58.2 kg (128 lb 6.4 oz)     Vital Signs with Ranges  Temp:  [97.5  F (36.4  C)-97.7  F (36.5  C)] 97.7  F (36.5  C)  Pulse:  [63-97] 68  Resp:  [14-16] 16  BP: (138-178)/(53-68) 178/55  SpO2:  [93 %-94 %] 94 %  I/O last 3 completed shifts:  In: 990 [P.O.:990]  Out: 950 [Urine:950]    GENERAL: No apparent  distress. Awake, alert, and fully oriented.  HEENT: Normocephalic, atraumatic. Extraocular movements intact.  CARDIOVASCULAR: Regular rate and rhythm without murmurs or rubs. No S3.  PULMONARY: Clear bilaterally.  GASTROINTESTINAL: Soft, non-tender, non-distended. Bowel sounds normoactive.   EXTREMITIES: No cyanosis or clubbing. No edema.  NEUROLOGICAL: CN 2-12 grossly intact, no focal neurological deficits.  DERMATOLOGICAL: No rash, ulcer, bruising, nor jaundice.     Medications       acetaminophen  975 mg Oral TID     amLODIPine  10 mg Oral Daily     carvedilol  12.5 mg Oral BID w/meals     cloNIDine  0.05 mg Oral BID     diclofenac  2 g Topical 4x Daily     enoxaparin ANTICOAGULANT  40 mg Subcutaneous Q24H     ertapenem (INVanz) IV  1 g Intravenous Q24H     gabapentin  200 mg Oral BID     isosorbide dinitrate  10 mg Oral TID     lisinopril  30 mg Oral Daily     menthol   Transdermal Q8H     omeprazole  20 mg Oral Daily     saccharomyces boulardii  250 mg Oral BID     senna-docusate  1 tablet Oral At Bedtime     sertraline  100 mg Oral Daily     Data     Laboratory:  Recent Labs   Lab 05/16/21  1209 05/12/21  0751   WBC 7.8 7.3   HGB 9.5* 10.0*   HCT 28.9* 31.7*   MCV 97 97    205     Recent Labs   Lab 05/16/21  1209 05/12/21  0751    136   POTASSIUM 4.3 4.0   CHLORIDE 105 105   CO2 29 27   ANIONGAP 1* 4   GLC 95 93   BUN 19 17   CR 0.63 0.63   GFRESTIMATED 79 79   GFRESTBLACK >90 >90   DAVE 9.0 9.1     Recent Labs   Lab 05/11/21  1613 05/11/21  1607   CULT No growth No growth       Imaging:  No results found for this or any previous visit (from the past 24 hour(s)).      German Hillman DO MPH  Formerly [facility] Hospitalist  [address] [place] [address]  05/18/2021

## 2021-05-19 ENCOUNTER — APPOINTMENT (OUTPATIENT)
Dept: MRI IMAGING | Facility: CLINIC | Age: 86
DRG: 690 | End: 2021-05-19
Attending: HOSPITALIST
Payer: COMMERCIAL

## 2021-05-19 PROCEDURE — 250N000013 HC RX MED GY IP 250 OP 250 PS 637: Performed by: INTERNAL MEDICINE

## 2021-05-19 PROCEDURE — A9585 GADOBUTROL INJECTION: HCPCS | Performed by: HOSPITALIST

## 2021-05-19 PROCEDURE — 250N000013 HC RX MED GY IP 250 OP 250 PS 637: Performed by: HOSPITALIST

## 2021-05-19 PROCEDURE — 99232 SBSQ HOSP IP/OBS MODERATE 35: CPT | Performed by: NURSE PRACTITIONER

## 2021-05-19 PROCEDURE — 72158 MRI LUMBAR SPINE W/O & W/DYE: CPT

## 2021-05-19 PROCEDURE — 250N000013 HC RX MED GY IP 250 OP 250 PS 637: Performed by: NURSE PRACTITIONER

## 2021-05-19 PROCEDURE — 99233 SBSQ HOSP IP/OBS HIGH 50: CPT | Performed by: HOSPITALIST

## 2021-05-19 PROCEDURE — 250N000011 HC RX IP 250 OP 636: Performed by: HOSPITALIST

## 2021-05-19 PROCEDURE — 72157 MRI CHEST SPINE W/O & W/DYE: CPT

## 2021-05-19 PROCEDURE — 120N000001 HC R&B MED SURG/OB

## 2021-05-19 PROCEDURE — 255N000002 HC RX 255 OP 636: Performed by: HOSPITALIST

## 2021-05-19 PROCEDURE — 87493 C DIFF AMPLIFIED PROBE: CPT | Performed by: INTERNAL MEDICINE

## 2021-05-19 RX ORDER — TAMSULOSIN HYDROCHLORIDE 0.4 MG/1
0.4 CAPSULE ORAL DAILY
Status: DISCONTINUED | OUTPATIENT
Start: 2021-05-19 | End: 2021-05-22 | Stop reason: HOSPADM

## 2021-05-19 RX ORDER — GADOBUTROL 604.72 MG/ML
7.5 INJECTION INTRAVENOUS ONCE
Status: COMPLETED | OUTPATIENT
Start: 2021-05-19 | End: 2021-05-19

## 2021-05-19 RX ADMIN — ISOSORBIDE DINITRATE 10 MG: 10 TABLET ORAL at 10:23

## 2021-05-19 RX ADMIN — DICLOFENAC SODIUM 4 G: 10 GEL TOPICAL at 20:42

## 2021-05-19 RX ADMIN — CARVEDILOL 12.5 MG: 12.5 TABLET, FILM COATED ORAL at 10:22

## 2021-05-19 RX ADMIN — Medication: at 20:43

## 2021-05-19 RX ADMIN — TAMSULOSIN HYDROCHLORIDE 0.4 MG: 0.4 CAPSULE ORAL at 12:00

## 2021-05-19 RX ADMIN — ERTAPENEM SODIUM 1 G: 1 INJECTION, POWDER, LYOPHILIZED, FOR SOLUTION INTRAMUSCULAR; INTRAVENOUS at 15:27

## 2021-05-19 RX ADMIN — DICLOFENAC SODIUM 4 G: 10 GEL TOPICAL at 15:28

## 2021-05-19 RX ADMIN — TRAMADOL HYDROCHLORIDE 50 MG: 50 TABLET, COATED ORAL at 06:41

## 2021-05-19 RX ADMIN — Medication: at 10:23

## 2021-05-19 RX ADMIN — TRAMADOL HYDROCHLORIDE 50 MG: 50 TABLET, COATED ORAL at 12:01

## 2021-05-19 RX ADMIN — OMEPRAZOLE 20 MG: 20 CAPSULE, DELAYED RELEASE ORAL at 10:20

## 2021-05-19 RX ADMIN — LISINOPRIL 30 MG: 20 TABLET ORAL at 10:20

## 2021-05-19 RX ADMIN — ACETAMINOPHEN 975 MG: 325 TABLET, FILM COATED ORAL at 23:32

## 2021-05-19 RX ADMIN — GABAPENTIN 300 MG: 300 CAPSULE ORAL at 23:33

## 2021-05-19 RX ADMIN — CLONIDINE HYDROCHLORIDE 0.05 MG: 0.1 TABLET ORAL at 20:41

## 2021-05-19 RX ADMIN — SERTRALINE HYDROCHLORIDE 100 MG: 100 TABLET ORAL at 10:21

## 2021-05-19 RX ADMIN — GADOBUTROL 6 ML: 604.72 INJECTION INTRAVENOUS at 14:39

## 2021-05-19 RX ADMIN — Medication: at 12:00

## 2021-05-19 RX ADMIN — CLONIDINE HYDROCHLORIDE 0.05 MG: 0.1 TABLET ORAL at 10:21

## 2021-05-19 RX ADMIN — Medication: at 15:28

## 2021-05-19 RX ADMIN — ENOXAPARIN SODIUM 40 MG: 40 INJECTION SUBCUTANEOUS at 12:00

## 2021-05-19 RX ADMIN — LIDOCAINE 2 PATCH: 560 PATCH PERCUTANEOUS; TOPICAL; TRANSDERMAL at 20:42

## 2021-05-19 RX ADMIN — AMLODIPINE BESYLATE 10 MG: 10 TABLET ORAL at 10:21

## 2021-05-19 RX ADMIN — CARVEDILOL 12.5 MG: 12.5 TABLET, FILM COATED ORAL at 18:38

## 2021-05-19 RX ADMIN — GABAPENTIN 200 MG: 100 CAPSULE ORAL at 10:22

## 2021-05-19 RX ADMIN — DICLOFENAC SODIUM 4 G: 10 GEL TOPICAL at 10:24

## 2021-05-19 RX ADMIN — ISOSORBIDE DINITRATE 10 MG: 10 TABLET ORAL at 15:27

## 2021-05-19 RX ADMIN — ACETAMINOPHEN 975 MG: 325 TABLET, FILM COATED ORAL at 15:26

## 2021-05-19 RX ADMIN — Medication 250 MG: at 10:23

## 2021-05-19 RX ADMIN — Medication 250 MG: at 20:43

## 2021-05-19 RX ADMIN — ISOSORBIDE DINITRATE 10 MG: 10 TABLET ORAL at 20:42

## 2021-05-19 RX ADMIN — DICLOFENAC SODIUM 4 G: 10 GEL TOPICAL at 12:00

## 2021-05-19 RX ADMIN — ACETAMINOPHEN 975 MG: 325 TABLET, FILM COATED ORAL at 10:18

## 2021-05-19 ASSESSMENT — ACTIVITIES OF DAILY LIVING (ADL)
ADLS_ACUITY_SCORE: 17

## 2021-05-19 NOTE — PLAN OF CARE
Pt A&O, forgetful.  Up SBA.  Pain managed with tramadol x 1 and voltaren/bengay cream.  MRI completed today - spine surgeon consult placed.  Pt to be fitted with a brace to wear while OOB. Stooling less frequent, voiding small amounts.  IV invanz administered per orders. Potential discharge to TCU Thursday.

## 2021-05-19 NOTE — PROGRESS NOTES
[facility]  Infectious Disease Progress Note          Assessment and Plan:   IMPRESSION:     1.  A 89-year-old female, well known to me from multiple prior admissions with complicated infection and medical history, now admitted with back pain, left flank discomfort, maybe slight cognition changes and possible small bowel obstruction, not perceived to be infection related, but historically has looked similar with prior UTI- related infections including the pain, so not entirely excluded that this is the actual clinical event here.  2.  ESBL positive urine culture with grossly abnormal urine, colonized chronically, but also with major recurrent infection, so hard to exclude completely that this is true infection.  3.  History of chronically colonized urine, prior stones including obstruction and multiple prior major infections including candidemia and bacteremias in the past.  4.  Prior C. diff, but not since 2019.  5.  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  6.  Bilateral total hip arthroplasties as a threatened location, but no prior infections.  7.  Mild chronic renal dysfunction.  8.  LISTED PENICILLIN AND NITROFURANTOIN ALLERGIES.  WE HAVE GIVEN HER SULFA BEFORE WHEN FORCED WHICH LISTED AS ALLERGIC, BUT THIS IS A FAMILY HISTORY OF THAT, NOT A PERSONAL HISTORY.     RECOMMENDATIONS:     1.  Difficult to know if infection issue however, adding everything together, certainly would treat at this point.  We will start ertapenem and plan on at least a short course.  2.  Get blood cultures before we start ertapenem.  Historically, in very similar circumstances, she has actually been bacteremic when this is occurring, has not had any effective agents against the organism yet, so would still likely find the organism on the Cipro she is on.  Obviously, discontinue Cipro.  3.  Previously addressed the overall picture.  There has been a large amount of interest in trying to do prophylactic  antibiotics, but they have no role to play here whatsoever.  She has highly resistant organisms, so despite getting recurrent sepsis and major infections, prophylaxis is not indicated and counterproductive.  In addition, the problematic long term situation here is whether to treat her asymptomatic bacteriuria given that she has had multiple recurrent major infections.  Usual practice is best not to treat her when it is truly asymptomatic bacteriuria; that does not mean, of course, here that asymptomatic bacteriuria is a benign condition.  It certainly is a threat of bacteremia/ hips, etc.  4 Back same and unlikely kidney/infection related presenting issue, day 9erta now, here longer any way so continue and do aggressive course in case significant, but if disposition ready OK to stop , if here to 10 days then stop tomorrow  Rash and wd back, not clear ? Does not look like zoster or infection, not likely the pain issue           Interval History:   no new complaints and doing well except pain; no cp, sob, n/v/d, or abd pain. Back pain about same no fever or fever sxs              Medications:       acetaminophen  975 mg Oral TID     amLODIPine  10 mg Oral Daily     carvedilol  12.5 mg Oral BID w/meals     cloNIDine  0.05 mg Oral BID     diclofenac  4 g Topical 4x Daily     enoxaparin ANTICOAGULANT  40 mg Subcutaneous Q24H     ertapenem (INVanz) IV  1 g Intravenous Q24H     gabapentin  200 mg Oral Daily     gabapentin  300 mg Oral At Bedtime     isosorbide dinitrate  10 mg Oral TID     lidocaine  2 patch Transdermal Q24h    And     lidocaine   Transdermal Q8H     lisinopril  30 mg Oral Daily     menthol (Topical Analgesic) 2.5%   Topical 4x Daily     omeprazole  20 mg Oral Daily     saccharomyces boulardii  250 mg Oral BID     senna-docusate  1 tablet Oral At Bedtime     sertraline  100 mg Oral Daily     tamsulosin  0.4 mg Oral Daily                  Physical Exam:   Blood pressure (!) 176/73, pulse 68, temperature 98   F (36.7  C), temperature source Temporal, resp. rate 18, weight 58.2 kg (128 lb 6.4 oz), SpO2 95 %, not currently breastfeeding.  Wt Readings from Last 2 Encounters:   05/08/21 58.2 kg (128 lb 6.4 oz)   11/18/20 57.6 kg (127 lb)     Vital Signs with Ranges  Temp:  [97.1  F (36.2  C)-98  F (36.7  C)] 98  F (36.7  C)  Pulse:  [60-70] 68  Resp:  [18] 18  BP: (136-176)/(54-73) 176/73  SpO2:  [92 %-96 %] 95 %    Constitutional: Awake, alert, cooperative, no apparent distress   Lungs: Clear to auscultation bilaterally, no crackles or wheezing   Cardiovascular: Regular rate and rhythm, normal S1 and S2, and no murmur noted   Abdomen: Normal bowel sounds, soft, non-distended, non-tender   Skin: No rashes, no cyanosis, no edema   Other:           Data:   All microbiology laboratory data reviewed.  Recent Labs   Lab Test 05/16/21  1209 05/12/21  0751 05/09/21  0557   WBC 7.8 7.3 8.0   HGB 9.5* 10.0* 10.2*   HCT 28.9* 31.7* 31.9*   MCV 97 97 97    205 196     Recent Labs   Lab Test 05/16/21  1209 05/12/21  0751 05/09/21  0557   CR 0.63 0.63 0.80     No lab results found.  Recent Labs   Lab Test 05/11/21  1613 05/11/21  1607 05/09/21  0120 11/02/20  0819 11/02/20  0817 10/31/20  1130 10/31/20  0941 10/31/20  0938 05/09/20  0944   CULT No growth No growth >100,000 colonies/mL  Aerococcus urinae  Identification obtained by MALDI-TOF mass spectrometry research use only database. Test   characteristics determined and verified by the [facility]   ([facility]) [place], [place].  Aerococcus is usually susceptible to penicillin, cephalosporins, tetracycline and   vancomycin.  *  >100,000 colonies/mL  Escherichia coli ESBL  ESBL (extended beta lactamase) producing organisms require contact precautions.  * No growth No growth <10,000 colonies/mL  Proteus mirabilis  *  <10,000 colonies/mL  Enterococcus faecium  *  <10,000 colonies/mL  Strain 2  Enterococcus faecium  *  <10,000 colonies/mL  Candida  glabrata complex  Susceptibility testing not routinely done  *  ID requested by Dr. Camejo on 11.2.20 at 1311. bw Cultured on the 1st day of incubation:  Candida glabrata complex  *  Critical Value/Significant Value, preliminary result only, called to and read back by  Elke Peña RN at 0155 11.2.20. amd   Cultured on the 1st day of incubation:  Candida glabrata complex  *  Critical Value/Significant Value, preliminary result only, called to and read back by  Elke Peña RN 11/2/20 @ 0250 TF    Susceptibility testing done on previous specimen No growth

## 2021-05-19 NOTE — CONSULTS
[facility]    Neurosurgery Consultation     Date of Admission:  5/8/2021  Date of Consult (When I saw the patient): 05/19/21    Assessment & Plan    Daya Ignacio is a 89 year old female who was admitted on 5/8/2021. I was asked to see the patient for back pain, acute L3 compression fracture, possible acute fracture L4, subacute T12 and L2 fractures, old L1 burst fracture, old compression fracture T11. Will consult Orthotics for TLSO. Follow up in 6 weeks with upright XR. Will sign off.     I have discussed the following assessment and plan with Dr. Agrawal who is in agreement with the initial plan and will follow up with further consultation recommendations.    MRI LUMBAR SPINE   1. Acute fracture through the L3 vertebral body with stress response  versus nondisplaced fracture in the superior endplate of L4.  2. Subacute fractures involving T12 and the superior endplate of L2.  3. Old burst fracture of L1 with moderate central canal stenosis.  4. Old compression fracture of T11.    Soo Luo PA-C  [facility] Neurosurgery  [place] [address]    Tel [phone]  Pager [phone]    Code Status    No CPR- Do NOT Intubate    Reason for Consult   Reason for consult: I was asked by Dr. Barfield to evaluate this patient for back pain and compression fractures.    Primary Care Physician   DINORAH DISLA    Chief Complaint   Back pain     History is obtained from the patient    History of Present Illness   Daya Ignacio is a 89 year old female who presents with history of acute on chronic back pain with radiographic evidence of acute L3 compression fracture, subacute T12 and L2 fractures, old L1 burst fracture, old compression fracture T11. Acute back pain started approximately 1 week ago. Daya admits she was doing activities the day before it started. Admits back pain is localized lower back and right paraspinal. Denies  radiation of pain, tingles, numbness, weakness, changes in bowel/bladder, changes in gait, saddle anesthesia. Nursing admits she has been ambulating and denies changes in weakness or bowel/bladder. Pain has been much better managed today 5/19 compared to the last few days. She will be discharged to TCU likely tomorrow.       Past Medical History   I have reviewed this patient's medical history and updated it with pertinent information if needed.   Past Medical History:   Diagnosis Date     Anemia      Anxiety      Arthritis      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 4/16/2014     Depression      h/o Clostridium difficile colitis 06/2019     History of blood transfusion      Hypertension      Kidney stone      Lumbar compression fracture (H)      Macular degeneration (senile) of retina, unspecified      Mild cognitive impairment      Mumps      Other chronic pain     low back     Recurrent UTI      Upper GI bleed 06/2019     Urinary tract infection due to extended-spectrum beta lactamase (ESBL)-producing Klebsiella 05/2019    resistant to Bactrim       Past Surgical History   I have reviewed this patient's surgical history and updated it with pertinent information if needed.  Past Surgical History:   Procedure Laterality Date     Cataract removal NOS Bilateral      COMBINED CYSTOSCOPY, INSERT STENT URETER(S) Left 5/6/2020    Procedure: Cystoscopy with left ureteral stent insertion;  Surgeon: Gaurav Munson MD;  Location:  OR     CYSTOSCOPY N/A 8/1/2019    Procedure: Exam under anesthesia, video cystopanendoscopy;  Surgeon: Dennis Carlson MD;  Location:  OR     ESOPHAGOSCOPY, GASTROSCOPY, DUODENOSCOPY (EGD), COMBINED N/A 6/11/2019    Procedure: ESOPHAGOGASTRODUODENOSCOPY (EGD);  Surgeon: Gaurav Degroto MD;  Location:  GI     EXTRACORPOREAL SHOCK WAVE LITHOTRIPSY (ESWL) Left 5/28/2020    Procedure: LEFT EXTRACORPOREAL SHOCK WAVE LITHOTRIPSY;  Surgeon: Gaurav Munson MD;  Location:  [facility] OR     Hip Pinning procedure Left      Hip replacement NOS Right      LASER HOLMIUM LITHOTRIPSY URETER(S), INSERT STENT, COMBINED Left 10/30/2020    Procedure: Cystoscopy, left ureteral stent exchange, left retrograde pyelogram, interpretation of fluoroscopic images, left ureteroscopy with holmium lithotripsy and stone basketing;  Surgeon: Gaurav Munson MD;  Location: [facility] OR     OPEN REDUCTION INTERNAL FIXATION TIBIAL PLATEAU Right 8/28/2019    Procedure: Open reduction internal fixation right proximal tibia fracture;  Surgeon: Molina Pardo MD;  Location: [facility] OR     TONSILLECTOMY, ADENOIDECTOMY, COMBINED         Prior to Admission Medications   Prior to Admission Medications   Prescriptions Last Dose Informant Patient Reported? Taking?   Calcium Carbonate (CALCIUM 600 PO)   Yes Yes   Sig: Take 1 tablet by mouth 2 times daily    Cholecalciferol (VITAMIN D3) 2000 UNITS CAPS   Yes Yes   Sig: Take 2,000 Units by mouth daily (with dinner)   Multiple Vitamins-Minerals (PRESERVISION AREDS) CAPS   Yes Yes   Sig: Take 1 capsule by mouth 2 times daily    SENNA-docusate sodium (SENNA S) 8.6-50 MG tablet   No Yes   Sig: Take 1 tablet by mouth At Bedtime   acetaminophen (TYLENOL) 500 MG tablet   Yes Yes   Sig: Take 500 mg by mouth 3 times daily as needed for mild pain   amLODIPine (NORVASC) 10 MG tablet   Yes Yes   Sig: Take 10 mg by mouth daily   carvedilol (COREG) 25 MG tablet   Yes Yes   Sig: Take 12.5 mg by mouth every morning    carvedilol (COREG) 25 MG tablet   Yes Yes   Sig: Take 25 mg by mouth At Bedtime   cloNIDine (CATAPRES) 0.1 MG tablet   Yes Yes   Sig: Take 0.05 mg by mouth 2 times daily    diclofenac (VOLTAREN) 1 % topical gel   Yes Yes   Sig: Place 2 g onto the skin 3 times daily as needed for moderate pain Apply to right knee   docusate sodium (COLACE) 100 MG capsule   No Yes   Sig: Take 1 capsule (100 mg) by mouth At Bedtime   gabapentin (NEURONTIN) 100 MG capsule   No Yes   Sig: Take 2  capsules (200 mg) by mouth 2 times daily   isosorbide dinitrate (ISORDIL) 10 MG tablet   No Yes   Sig: Take 1 tablet (10 mg) by mouth 3 times daily   lisinopril (ZESTRIL) 30 MG tablet   Yes Yes   Sig: Take 30 mg by mouth daily   omeprazole (PRILOSEC) 20 MG DR capsule   Yes Yes   Sig: Take 20 mg by mouth daily    polyethylene glycol (MIRALAX/GLYCOLAX) packet   No Yes   Sig: Take 17 g by mouth daily as needed for constipation   potassium chloride ER (KLOR-CON M) 10 MEQ CR tablet   Yes Yes   Sig: Take 10 mEq by mouth daily   sertraline (ZOLOFT) 100 MG tablet   Yes Yes   Sig: Take 100 mg by mouth daily   vitamin C (ASCORBIC ACID) 500 MG tablet   Yes Yes   Sig: Take 500 mg by mouth daily at 4pm       Facility-Administered Medications: None     Allergies   Allergies   Allergen Reactions     Atorvastatin Muscle Pain (Myalgia)     Macrobid [Nitrofurantoin Anhydrous] Other (See Comments)     Body aches     Nitrofurantoin Muscle Pain (Myalgia) and Unknown     Body aches       Penicillins Unknown     Seasonal Allergies Unknown     Sulfa Drugs Other (See Comments)     Tolerated Bactrim May 2019  Entire family has allergy to Sulfa     Sulfasalazine Unknown       Social History   I have reviewed this patient's social history and updated it with pertinent information if needed. Daya Ignacio  reports that she has quit smoking. Her smoking use included cigarettes. She has never used smokeless tobacco. She reports that she does not drink alcohol or use drugs.    Family History   I have reviewed this patient's family history and updated it with pertinent information if needed.   Family History   Problem Relation Age of Onset     Alzheimer Disease Mother         mild     Cardiovascular Father         heart attack     Neurologic Disorder Brother 83        Parkinson's       Review of Systems   10 point system negative besides what is in HPI     Physical Exam   Temp: 98  F (36.7  C) Temp src: Temporal BP: (!) 176/73 Pulse: 68   Resp: 18  SpO2: 95 % O2 Device: None (Room air)    Vital Signs with Ranges  Temp:  [97.1  F (36.2  C)-98  F (36.7  C)] 98  F (36.7  C)  Pulse:  [60-70] 68  Resp:  [18] 18  BP: (136-176)/(55-73) 176/73  SpO2:  [92 %-95 %] 95 %  128 lbs 6.4 oz     , Blood pressure (!) 176/73, pulse 68, temperature 98  F (36.7  C), temperature source Temporal, resp. rate 18, weight 128 lb 6.4 oz (58.2 kg), SpO2 95 %, not currently breastfeeding.  128 lbs 6.4 oz    NEUROLOGICAL EXAMINATION:     Mental status:  Awake, alert, following commands, answering questions appropriately   Cranial nerves:  II-XII intact.   Motor:  Strength is 5/5 throughout the upper and lower extremities  Sensation:  Intact to light touch   Reflexes:   Negative Clonus.    Gait:  Stable per nursing, did not assess    Lumbar examination reveals TTP at L3-4 spinous processes and paraspinal on right side. Denies TTP throughout cervical and thoracic spinous processes.     Straight leg raise is negative bilaterally.       Data   All new lab and imaging data was personally reviewed by me.  MRI:  MR LUMBAR SPINE WITHOUT AND WITH CONTRAST 5/19/2021 2:49 PM   HISTORY: Low back pain, > 6 weeks.  TECHNIQUE: Multiplanar multisequence images were obtained through the  lumbar spine without and with contrast. 6 mL of Gadavist given.  COMPARISON: None.  FINDINGS: Five lumbar type vertebral bodies are assumed. There is an  old burst fracture deformity at L1 with 0.7 cm of posterior protrusion  into the canal resulting in some moderate central canal stenosis.  Anteriorly wedge compression fractures are also noted at T11, T12, and  L2. There is abnormal marrow signal intensity within the L3 vertebral  body with some central endplate compression of both the superior and  inferior endplates as well as some minimal wedging. This is most  likely due to an acute benign compression fracture. Small amount of  discogenic marrow change or abnormal marrow signal intensity is also  seen in the superior  endplate and inferior endplate of T12 which could  indicate that this fracture is subacute. Paraspinal soft tissues and  visualized bony pelvis are unremarkable. Postcontrast images show some  enhancement along the endplates at T12 and the superior endplate of L2  as well as the majority of the L3 vertebral body. There is also some  signal hyperintensity and enhancement along the superior endplate of  L4 without any compression deformity.  Multilevel degenerative disc and facet disease is also present with  moderate to severe central canal stenosis at L2-L3, and moderate  central canal stenosis at L3-L4.  IMPRESSION:  1. Acute fracture through the L3 vertebral body with stress response  versus nondisplaced fracture in the superior endplate of L4.  2. Subacute fractures involving T12 and the superior endplate of L2.  3. Old burst fracture of L1 with moderate central canal stenosis.  4. Old compression fracture of T11.  5. Multilevel degenerative disc and facet disease with moderate to  severe central canal stenosis at L2-L3 and moderate central canal  stenosis at L3-L4.  CBC RESULTS:   Recent Labs   Lab Test 05/16/21  1209   WBC 7.8   RBC 2.97*   HGB 9.5*   HCT 28.9*   MCV 97   MCH 32.0   MCHC 32.9   RDW 14.0        Basic Metabolic Panel:  Lab Results   Component Value Date     05/16/2021      Lab Results   Component Value Date    POTASSIUM 4.3 05/16/2021     Lab Results   Component Value Date    CHLORIDE 105 05/16/2021     Lab Results   Component Value Date    DAVE 9.0 05/16/2021     Lab Results   Component Value Date    CO2 29 05/16/2021     Lab Results   Component Value Date    BUN 19 05/16/2021     Lab Results   Component Value Date    CR 0.63 05/16/2021     Lab Results   Component Value Date    GLC 95 05/16/2021     INR:  Lab Results   Component Value Date    INR 1.13 05/06/2020    INR 1.18 08/28/2019    INR 1.18 06/20/2017    INR 1.21 06/27/2011    INR 1.30 06/26/2011    INR 1.40 06/25/2011    INR  1.53 06/24/2011    INR 3.09 06/24/2011    INR 1.66 06/23/2011    INR 1.05 06/23/2011    INR 0.93 11/03/2010    INR 1.01 07/09/2007

## 2021-05-19 NOTE — PROGRESS NOTES
[facility]  Pain Management Progress Note  Text Page     Assessment & Plan   Daya Ignacio is a 89 year old female who was admitted on 5/8/2021.   PLAN:   1)  Opioids:  -Continue with tramadol 50 mg every 6 hours PRN limit 300 mg /day Cr.Cl > 30, <30 200 mg per day  Consider change to Oxycodone 2.5 mg eery 4 hrs prn  if pain not controlled with tramadol under current parameters    Opioids Treatment Goal:   -Improvement in function  -Participate in PT     2)Non-opioid multimodal medication therapy  -Topical:Voltaren 1% Topical Gel 4 times daily, Lidocaine Patch 4% 2 patches at HS, bengay vanishing cream 4 times daily  -N-SAIDS: could consider low dose toradol while inpatient if pain continues to be a concern  -Muscle Relaxants:None indicated  -Adjuvants:Acetaminophen 975 mg every 8 hours , Gabapentin increased to 300 mg at HS and 200 mg daily.- if patient tolerates the increase and still has pain would increase to 300 mg BID on 5/20  -Antidepresants/anxiolytics:Sertraline 100 mg daily as previously on     3)  Non-medication interventions  Positioning, Heating pad PRN, ICE, Relaxation  Await spine consultation, consider bracing     4)  Constipation Prophylaxis  Continue to Monitor, Bowel Meds PRN per Constipation Order Set, Senna-S 1 tablet at bedtime     5)  Pain Education  -Opioid safe use, storage and disposal information included in DC AVS     6)  DC Planning   Discussed goal of Opioid therapy as above with patient and nursing staff  Length of therapy is less than 10 days, opioids may be stopped without taper.  Continued outpatient management of pain per PCP  Disposition: TBD likely TCU  Support systems: family  Outpatient Referrals: none at this time  The following risk factors have been identified for unintentional overdose: patient is > 65 years old. Discharge with intra-nasal naloxone if discharged to home with opioids  >40 mg MME/day.  Plan for education prior to  discharge.     ASSESSMENT:  1)  Acute on chronic back pain s/p compression fractures      2)  Patient with chronic back pain, not on chronic opioids  Baseline 0 mg Daily Morphine Equivalent as dispensed and as reported daily use.  Patient has no expected opioid tolerance.      Patient's opioid use in past 24 hours: 150 mg Tramadol  = 12.5 mg Daily Morphine Equivalent     3)  Risk factors for opioid related harms  -Sleep disordered breathing  - Age > 65 years old     4)  Opioid induced side-effects:  -Constipation   -Sedation     5)  Other/Related:    -Deconditioning  -depression/anxiety on selective serotonin reuptake inhibitor  -Acute Kidney   -Kidney stones  -falls risk    Lizzy Porter RN, PGMT-BC,APRN, CNP, ACHPN  Pain Management and Palliative Care   [facility]  Pgr: [phone]    Time Spent on this Encounter   Total unit/floor time 25 minutes, time consisted of the following, examination of the patient, reviewing the record and completing documentation. >50% of time spent in counseling and coordination of care.  Time spend counseling with patient consisted of the following topics, symptom management.  Time spent in coordination of care with Bedside Nurse Ninfa Sharif RN.       Interval History    pain better controlled, still low back focal  No BM  Participating in Physical Therapy   Lumbar MRI today  with acute fracture L3 subacute T12, L2,- L1 Multilevel degenerative disc and facet disease with moderate to  severe central canal stenosis at L2-L3 and moderate central canal  stenosis at L3-L4    Review of Systems    CONSTITUTIONAL: NEGATIVE for fever, chills, change in weight  ENT/MOUTH: NEGATIVE for ear, mouth and throat problems  RESP: NEGATIVE for significant cough or SOB  CV: NEGATIVE for chest pain, palpitations or peripheral edema    Physical Exam   Temp:  [97.1  F (36.2  C)-98  F (36.7  C)] 98  F (36.7  C)  Pulse:  [60-70] 68  Resp:  [18] 18  BP:  (136-176)/(54-73) 176/73  SpO2:  [92 %-96 %] 95 %  128 lbs 6.4 oz  GEN:  Alert, oriented x 3, appears comfortable, NAD.  HEENT:  Normocephalic/atraumatic, no scleral icterus, no nasal discharge, mouth moist.  CV:  RRR, S1, S2; no murmurs or other irregularities noted.  +3 DP/PT pulses bilatererally; no edema BLE.  RESP:  Clear to auscultation bilaterally without rales/rhonchi/wheezing/retractions.  Symmetric chest rise on inhalation noted.  Normal respiratory effort.  ABD:  Rounded, soft, non-tender/non-distended.  +BS  EXT:  Edema & pulses as noted above.  CMS intact x 4.     M/S:   Nontender to palpation throughout.    SKIN:  Dry to touch, no exanthems noted in the visualized areas.    NEURO: Symmetric strength +5/5. Dorsiflexion 4+/5 plantar  Flexion 4/5.   Sensation to touch intact all extremities.    PAIN BEHAVIOR: Cooperative  Psych:  Normal affect.  Calm, pleasent cooperative, conversant appropriately.    Medications       acetaminophen  975 mg Oral TID     amLODIPine  10 mg Oral Daily     carvedilol  12.5 mg Oral BID w/meals     cloNIDine  0.05 mg Oral BID     diclofenac  4 g Topical 4x Daily     enoxaparin ANTICOAGULANT  40 mg Subcutaneous Q24H     ertapenem (INVanz) IV  1 g Intravenous Q24H     gabapentin  200 mg Oral Daily     gabapentin  300 mg Oral At Bedtime     isosorbide dinitrate  10 mg Oral TID     lidocaine  2 patch Transdermal Q24h    And     lidocaine   Transdermal Q8H     lisinopril  30 mg Oral Daily     menthol (Topical Analgesic) 2.5%   Topical 4x Daily     omeprazole  20 mg Oral Daily     saccharomyces boulardii  250 mg Oral BID     senna-docusate  1 tablet Oral At Bedtime     sertraline  100 mg Oral Daily     tamsulosin  0.4 mg Oral Daily       Data   Results for orders placed or performed during the hospital encounter of 05/08/21 (from the past 24 hour(s))   MR Lumbar Spine w/o & w Contrast    Narrative    MR LUMBAR SPINE WITHOUT AND WITH CONTRAST 5/19/2021 2:49 PM     HISTORY: Low back  pain, > 6 weeks.    TECHNIQUE: Multiplanar multisequence images were obtained through the  lumbar spine without and with contrast. 6 mL of Gadavist given.    COMPARISON: None.    FINDINGS: Five lumbar type vertebral bodies are assumed. There is an  old burst fracture deformity at L1 with 0.7 cm of posterior protrusion  into the canal resulting in some moderate central canal stenosis.  Anteriorly wedge compression fractures are also noted at T11, T12, and  L2. There is abnormal marrow signal intensity within the L3 vertebral  body with some central endplate compression of both the superior and  inferior endplates as well as some minimal wedging. This is most  likely due to an acute benign compression fracture. Small amount of  discogenic marrow change or abnormal marrow signal intensity is also  seen in the superior endplate and inferior endplate of T12 which could  indicate that this fracture is subacute. Paraspinal soft tissues and  visualized bony pelvis are unremarkable. Postcontrast images show some  enhancement along the endplates at T12 and the superior endplate of L2  as well as the majority of the L3 vertebral body. There is also some  signal hyperintensity and enhancement along the superior endplate of  L4 without any compression deformity.    Multilevel degenerative disc and facet disease is also present with  moderate to severe central canal stenosis at L2-L3, and moderate  central canal stenosis at L3-L4.      Impression    IMPRESSION:  1. Acute fracture through the L3 vertebral body with stress response  versus nondisplaced fracture in the superior endplate of L4.  2. Subacute fractures involving T12 and the superior endplate of L2.  3. Old burst fracture of L1 with moderate central canal stenosis.  4. Old compression fracture of T11.  5. Multilevel degenerative disc and facet disease with moderate to  severe central canal stenosis at L2-L3 and moderate central canal  stenosis at L3-L4.    MINA CURRY,  MD

## 2021-05-19 NOTE — PLAN OF CARE
RN [id number]  A&Ox4, VSS, up SBA GB/W. Tramadol x2 for pain w/ scheduled bengay/voltaren gel and lidocaine patches. IV invanz abx. Experiencing urinary frequency and urgency.    MRI not yet completed. Plan to discharge to TCU when medically cleared.

## 2021-05-19 NOTE — PROGRESS NOTES
[facility]    Hospitalist Progress Note  Name: Daya Ignacio    MRN: [id number]  Provider:  German Hillman DO, MPH  Date of Service: 05/19/2021    Summary of Stay: Daya Ignacio is a 89 year old female with a history of hypertension, depression, cognitive impairment, nephrolithiasis, lumbar compression fractures, chronic pain, recurrent UTI admitted on 5/8/2021 with back pain.  Urinalysis again abnormal and urine culture growing Aerococcus and ESBL E. coli.  Has been on ertapenem and infectious disease following.  CT shows no evidence of obstructing ureteral calculi or hydronephrosis.  There is a left flank rash of unclear significance.  It does appear consistent with a large blister that has since ruptured.  Topical products are being provided and WOC following.  Pain has been intermittent and showing gradual improvement with topical analgesics.  This morning was found to have urinary retention which is being addressed with straight catheterizations.  Ultimate plan for TCU on discharge in the next 1 to 2 days.    Problem List:   1. Aerococcus and ESBL E. coli UTI: Appreciate infectious disease consultation.  Currently on ertapenem.  No plans to discharge with antibiotics given potential for colonization and asymptomatic bacteriuria.  Unfortunately has had issues with recurrent UTIs chronically abnormal urinalysis in the past.  2. Mid back pain: Continues to be intermittent but overall showing gradual improvement.  CT shows no obstructing ureteral calculi nor hydronephrosis but there is nonobstructing intrarenal calculi.  She has chronic severe thoracic and lumbar compression fractures.  Overall suspect musculoskeletal pain.  Continue topical analgesics, scheduled acetaminophen, and low-dose tramadol as needed.  Her pain remains debilitating so check thoracic and lumbar spine MRI.  Appreciate pain management consultation, pain has improved.  3. Left flank rash: Per previous documentation appears  to be a blister that has since ruptured.  Will request [facility] evaluation.  I did not see the early stages of this wound but at this time does not appear consistent with zoster.  Will defer to infectious disease if this requires further evaluation with regards to the potential previous zoster infection.  4. Possible early partial SBO versus ileus: Only seen on imaging.  Nothing clinical to support this at this time.  Having bowel movements and no abdominal pain.  5. Hypertension: Blood pressure reasonable.  Continue carvedilol, lisinopril, Imdur, amlodipine.  6. Depression: Continue sertraline.  7. Chronic adnexal cyst: Outpatient evaluation/monitoring.  8. New onset left hip pain: Difficulty with ambulation.  Continue PT.  Apparently now resolved.  9. Urinary retention: Continue routine straight caths, would like to hold off on Almanza given recurrent UTI.    DVT Prophylaxis: Enoxaparin (Lovenox) SQ  Code Status: No CPR- Do NOT Intubate  Diet: Regular Diet Adult  Snacks/Supplements Adult: Ensure Enlive; With Meals    Almanza Catheter: not present  Disposition: Expected discharge in 1 days to TCU. Goals prior to discharge include work up as above.   Incidental Findings: As above.  Family updated today: Not today.     Interval History   The patient reports doing well except continued mid back pain. No chest pain or shortness of breath. No nausea, vomiting, diarrhea, constipation. No fevers. No other specific complaints identified.     -Data reviewed today: I personally reviewed all new labs and imaging results over the last 24 hours.     Physical Exam   Temp: 98  F (36.7  C) Temp src: Temporal BP: (!) 176/73 Pulse: 68   Resp: 18 SpO2: 95 % O2 Device: None (Room air)    Vitals:    05/08/21 2351   Weight: 58.2 kg (128 lb 6.4 oz)     Vital Signs with Ranges  Temp:  [97.1  F (36.2  C)-98  F (36.7  C)] 98  F (36.7  C)  Pulse:  [60-70] 68  Resp:  [18] 18  BP: (136-176)/(54-73) 176/73  SpO2:  [92 %-96 %] 95 %  I/O last 3 completed  shifts:  In: -   Out: 201 [Urine:201]    GENERAL: No apparent distress. Awake, alert, and fully oriented.  HEENT: Normocephalic, atraumatic. Extraocular movements intact.  CARDIOVASCULAR: Regular rate and rhythm without murmurs or rubs. No S3.  PULMONARY: Clear bilaterally.  GASTROINTESTINAL: Soft, non-tender, non-distended. Bowel sounds normoactive.   EXTREMITIES: No cyanosis or clubbing. No edema.  NEUROLOGICAL: CN 2-12 grossly intact, no focal neurological deficits.  DERMATOLOGICAL: No rash, ulcer, bruising, nor jaundice.     Medications       acetaminophen  975 mg Oral TID     amLODIPine  10 mg Oral Daily     carvedilol  12.5 mg Oral BID w/meals     cloNIDine  0.05 mg Oral BID     diclofenac  4 g Topical 4x Daily     enoxaparin ANTICOAGULANT  40 mg Subcutaneous Q24H     ertapenem (INVanz) IV  1 g Intravenous Q24H     gabapentin  200 mg Oral Daily     gabapentin  300 mg Oral At Bedtime     isosorbide dinitrate  10 mg Oral TID     lidocaine  2 patch Transdermal Q24h    And     lidocaine   Transdermal Q8H     lisinopril  30 mg Oral Daily     menthol (Topical Analgesic) 2.5%   Topical 4x Daily     omeprazole  20 mg Oral Daily     saccharomyces boulardii  250 mg Oral BID     senna-docusate  1 tablet Oral At Bedtime     sertraline  100 mg Oral Daily     Data     Laboratory:  Recent Labs   Lab 05/16/21  1209   WBC 7.8   HGB 9.5*   HCT 28.9*   MCV 97        Recent Labs   Lab 05/16/21  1209      POTASSIUM 4.3   CHLORIDE 105   CO2 29   ANIONGAP 1*   GLC 95   BUN 19   CR 0.63   GFRESTIMATED 79   GFRESTBLACK >90   DAVE 9.0     No results for input(s): CULT in the last 168 hours.    Imaging:  No results found for this or any previous visit (from the past 24 hour(s)).      German Hillman DO MPH  [facility] Hospitalist  [address] [place] [address]  05/19/2021

## 2021-05-19 NOTE — PROGRESS NOTES
.Care Management Follow Up    Length of Stay (days): 11    Expected Discharge Date: per review of MD documentation from today, noted expected discharge in 1 day, medical work-up in progress.       Patient plan of care discussed at interdisciplinary rounds: Yes    Anticipated Discharge Disposition:  [facility]. Insurance authorization yet pending per discussion this morning with admissions coordinator with [facility].      Anticipated Discharge Services:  therapy          Referrals Placed by CM/SW:  rehab facility, transportation  Additional Information:     Will await insurance authorization, when received will arrange transport with anticipation of discharge tomorrow pending MD clearance for discharge and insurance authorization. Will update son with plans when determined.         .    ROSE Jon   Inpatient Care Coordination   [facility]     [phone]

## 2021-05-20 ENCOUNTER — APPOINTMENT (OUTPATIENT)
Dept: PHYSICAL THERAPY | Facility: CLINIC | Age: 86
DRG: 690 | End: 2021-05-20
Payer: COMMERCIAL

## 2021-05-20 LAB
C DIFF TOX B STL QL: NEGATIVE
PLATELET # BLD AUTO: 215 10E9/L (ref 150–450)
SPECIMEN SOURCE: NORMAL

## 2021-05-20 PROCEDURE — 250N000011 HC RX IP 250 OP 636: Performed by: HOSPITALIST

## 2021-05-20 PROCEDURE — 250N000013 HC RX MED GY IP 250 OP 250 PS 637: Performed by: NURSE PRACTITIONER

## 2021-05-20 PROCEDURE — L0456 TLSO FLEX TRNK SJ-SS PRE CST: HCPCS

## 2021-05-20 PROCEDURE — 99233 SBSQ HOSP IP/OBS HIGH 50: CPT | Performed by: HOSPITALIST

## 2021-05-20 PROCEDURE — 85049 AUTOMATED PLATELET COUNT: CPT | Performed by: HOSPITALIST

## 2021-05-20 PROCEDURE — 250N000013 HC RX MED GY IP 250 OP 250 PS 637: Performed by: HOSPITALIST

## 2021-05-20 PROCEDURE — 250N000013 HC RX MED GY IP 250 OP 250 PS 637: Performed by: INTERNAL MEDICINE

## 2021-05-20 PROCEDURE — 120N000001 HC R&B MED SURG/OB

## 2021-05-20 PROCEDURE — 99231 SBSQ HOSP IP/OBS SF/LOW 25: CPT | Performed by: NURSE PRACTITIONER

## 2021-05-20 PROCEDURE — 36415 COLL VENOUS BLD VENIPUNCTURE: CPT | Performed by: HOSPITALIST

## 2021-05-20 PROCEDURE — 97530 THERAPEUTIC ACTIVITIES: CPT | Mod: GP | Performed by: PHYSICAL THERAPIST

## 2021-05-20 PROCEDURE — 97116 GAIT TRAINING THERAPY: CPT | Mod: GP | Performed by: PHYSICAL THERAPIST

## 2021-05-20 RX ORDER — GABAPENTIN 100 MG/1
100 CAPSULE ORAL ONCE
Status: COMPLETED | OUTPATIENT
Start: 2021-05-20 | End: 2021-05-20

## 2021-05-20 RX ORDER — GABAPENTIN 300 MG/1
300 CAPSULE ORAL DAILY
Status: DISCONTINUED | OUTPATIENT
Start: 2021-05-21 | End: 2021-05-22 | Stop reason: HOSPADM

## 2021-05-20 RX ADMIN — DICLOFENAC SODIUM 4 G: 10 GEL TOPICAL at 12:37

## 2021-05-20 RX ADMIN — LIDOCAINE 2 PATCH: 560 PATCH PERCUTANEOUS; TOPICAL; TRANSDERMAL at 20:38

## 2021-05-20 RX ADMIN — Medication: at 12:37

## 2021-05-20 RX ADMIN — CARVEDILOL 12.5 MG: 12.5 TABLET, FILM COATED ORAL at 09:00

## 2021-05-20 RX ADMIN — GABAPENTIN 100 MG: 100 CAPSULE ORAL at 12:34

## 2021-05-20 RX ADMIN — GABAPENTIN 200 MG: 100 CAPSULE ORAL at 09:00

## 2021-05-20 RX ADMIN — ISOSORBIDE DINITRATE 10 MG: 10 TABLET ORAL at 14:46

## 2021-05-20 RX ADMIN — CARVEDILOL 12.5 MG: 12.5 TABLET, FILM COATED ORAL at 17:44

## 2021-05-20 RX ADMIN — CLONIDINE HYDROCHLORIDE 0.05 MG: 0.1 TABLET ORAL at 20:37

## 2021-05-20 RX ADMIN — AMLODIPINE BESYLATE 10 MG: 10 TABLET ORAL at 08:58

## 2021-05-20 RX ADMIN — CLONIDINE HYDROCHLORIDE 0.05 MG: 0.1 TABLET ORAL at 09:00

## 2021-05-20 RX ADMIN — DICLOFENAC SODIUM 4 G: 10 GEL TOPICAL at 09:09

## 2021-05-20 RX ADMIN — TRAMADOL HYDROCHLORIDE 50 MG: 50 TABLET, COATED ORAL at 15:57

## 2021-05-20 RX ADMIN — ISOSORBIDE DINITRATE 10 MG: 10 TABLET ORAL at 09:01

## 2021-05-20 RX ADMIN — ENOXAPARIN SODIUM 40 MG: 40 INJECTION SUBCUTANEOUS at 12:32

## 2021-05-20 RX ADMIN — TAMSULOSIN HYDROCHLORIDE 0.4 MG: 0.4 CAPSULE ORAL at 09:02

## 2021-05-20 RX ADMIN — Medication 250 MG: at 20:38

## 2021-05-20 RX ADMIN — SERTRALINE HYDROCHLORIDE 100 MG: 100 TABLET ORAL at 09:02

## 2021-05-20 RX ADMIN — ACETAMINOPHEN 975 MG: 325 TABLET, FILM COATED ORAL at 14:45

## 2021-05-20 RX ADMIN — OMEPRAZOLE 20 MG: 20 CAPSULE, DELAYED RELEASE ORAL at 09:02

## 2021-05-20 RX ADMIN — LISINOPRIL 30 MG: 20 TABLET ORAL at 09:01

## 2021-05-20 RX ADMIN — ACETAMINOPHEN 975 MG: 325 TABLET, FILM COATED ORAL at 08:57

## 2021-05-20 RX ADMIN — DICLOFENAC SODIUM 4 G: 10 GEL TOPICAL at 20:37

## 2021-05-20 RX ADMIN — DICLOFENAC SODIUM 4 G: 10 GEL TOPICAL at 15:51

## 2021-05-20 RX ADMIN — Medication: at 15:51

## 2021-05-20 RX ADMIN — Medication: at 20:38

## 2021-05-20 RX ADMIN — Medication 250 MG: at 09:02

## 2021-05-20 RX ADMIN — ISOSORBIDE DINITRATE 10 MG: 10 TABLET ORAL at 20:38

## 2021-05-20 RX ADMIN — Medication: at 09:07

## 2021-05-20 ASSESSMENT — ACTIVITIES OF DAILY LIVING (ADL)
ADLS_ACUITY_SCORE: 17

## 2021-05-20 NOTE — PROGRESS NOTES
.Care Management Follow Up    Length of Stay (days): 12    Expected Discharge Date: 05/21/21        Patient plan of care discussed at interdisciplinary rounds: Yes    Anticipated Discharge Disposition:  [facility]   Anticipated Discharge Services:  therapy      Patient/family educated on Medicare website which has current facility and service quality ratings:  Yes  Education Provided on the Discharge Plan:  Yes  Patient/Family in Agreement with the Plan:  Yes    Referrals Placed by CM/SW:  Rehab facility and transportation  Private pay costs discussed: transportation costs    Additional Information:    [facility] has obtained insurance authorization. Per previous discussions with Gael, medical transport has been requested.. [email or web address], w/c arranged [facility] 10[facility] 5/[facility]  Son has been updated. Regarding anticipated discharge tomorrow, and discharge plan.     Plan:     SW available until discharge should adjustments be needed in plan as noted.          .    ROSE Jon   Inpatient Care Coordination   [facility]     [phone]    Addendum:        [id number]

## 2021-05-20 NOTE — PROGRESS NOTES
[facility]    Hospitalist Progress Note  Name: Daya Ignacio    MRN: [id number]  Provider:  German Hillman DO, MPH  Date of Service: 05/20/2021    Summary of Stay: Daya Ignacio is a 89 year old female with a history of hypertension, depression, cognitive impairment, nephrolithiasis, lumbar compression fractures, chronic pain, recurrent UTI admitted on 5/8/2021 with back pain.  Urinalysis again abnormal and urine culture growing Aerococcus and ESBL E. coli.  Has been on ertapenem and infectious disease following.  CT shows no evidence of obstructing ureteral calculi or hydronephrosis.  There is a left flank rash of unclear significance.  It does appear consistent with a large blister that has since ruptured.  Topical products are being provided and WOC following.  Pain has been intermittent and showing gradual improvement with topical analgesics.  She was found to have urinary retention which is being addressed with straight catheterizations.  Back pain continued so MRI of the thoracic and lumbar spine performed showing numerous compression fractures.  Spine surgery consulted with plans for TLSO per orthotics.  Ultimate plan for TCU on discharge in the next 1 to 2 days.    Problem List:   1. Aerococcus and ESBL E. coli UTI: Appreciate infectious disease consultation.  Currently on ertapenem, finishing antibiotics today.  No plans to discharge with antibiotics given potential for colonization and asymptomatic bacteriuria.  Unfortunately has had issues with recurrent UTIs chronically abnormal urinalysis in the past.  2. Mid back pain due to numerous vertebral fractures: MRI yesterday shows L3 compression fracture, possible acute fracture L4, subacute T12 and L2 fractures, old L1 burst fracture, old compression fracture T11.  Pain continues to be intermittent but overall showing gradual improvement.  CT shows no obstructing ureteral calculi nor hydronephrosis but there is nonobstructing intrarenal  calculi.  Appreciate pain management consultation, pain has improved.  TLSO bracing per orthotics.  3. Left flank rash: Per previous documentation appears to be a blister that has since ruptured.  Will request WOC evaluation.  I did not see the early stages of this wound but at this time does not appear consistent with zoster.  Will defer to infectious disease if this requires further evaluation with regards to the potential previous zoster infection.  4. Possible early partial SBO versus ileus: Only seen on imaging.  Nothing clinical to support this at this time.  Having bowel movements and no abdominal pain.  5. Hypertension: Blood pressure reasonable.  Continue carvedilol, lisinopril, Imdur, amlodipine.  6. Depression: Continue sertraline.  7. Chronic adnexal cyst: Outpatient evaluation/monitoring.  8. New onset left hip pain: Difficulty with ambulation.  Continue PT.  Apparently now resolved.  9. Urinary retention: Continue routine straight caths, would like to hold off on Almanza given recurrent UTI.    DVT Prophylaxis: Enoxaparin (Lovenox) SQ  Code Status: No CPR- Do NOT Intubate  Diet: Regular Diet Adult  Snacks/Supplements Adult: Ensure Enlive; With Meals    Almanza Catheter: not present  Disposition: Expected discharge in 1 days to TCU. Goals prior to discharge include work up as above.   Incidental Findings: As above.  Family updated today: Called yesterday and left a voicemail.     Interval History   The patient reports doing well except continued mid back pain. No chest pain or shortness of breath. No nausea, vomiting, diarrhea, constipation. No fevers. No other specific complaints identified.     -Data reviewed today: I personally reviewed all new labs and imaging results over the last 24 hours.     Physical Exam   Temp: 97.3  F (36.3  C) Temp src: Temporal BP: (!) 150/53 Pulse: 61   Resp: 20 SpO2: 95 % O2 Device: None (Room air)    Vitals:    05/08/21 2351   Weight: 58.2 kg (128 lb 6.4 oz)     Vital Signs  with Ranges  Temp:  [97.2  F (36.2  C)-97.9  F (36.6  C)] 97.3  F (36.3  C)  Pulse:  [59-63] 61  Resp:  [18-28] 20  BP: (124-160)/(50-62) 150/53  SpO2:  [92 %-96 %] 95 %  No intake/output data recorded.    GENERAL: No apparent distress. Awake, alert, and fully oriented.  HEENT: Normocephalic, atraumatic. Extraocular movements intact.  CARDIOVASCULAR: Regular rate and rhythm without murmurs or rubs. No S3.  PULMONARY: Clear bilaterally.  GASTROINTESTINAL: Soft, non-tender, non-distended. Bowel sounds normoactive.   EXTREMITIES: No cyanosis or clubbing. No edema.  NEUROLOGICAL: CN 2-12 grossly intact, no focal neurological deficits.  DERMATOLOGICAL: No rash, ulcer, bruising, nor jaundice.     Medications       acetaminophen  975 mg Oral TID     amLODIPine  10 mg Oral Daily     carvedilol  12.5 mg Oral BID w/meals     cloNIDine  0.05 mg Oral BID     diclofenac  4 g Topical 4x Daily     enoxaparin ANTICOAGULANT  40 mg Subcutaneous Q24H     [START ON 5/21/2021] gabapentin  300 mg Oral Daily     gabapentin  300 mg Oral At Bedtime     isosorbide dinitrate  10 mg Oral TID     lidocaine  2 patch Transdermal Q24h    And     lidocaine   Transdermal Q8H     lisinopril  30 mg Oral Daily     menthol (Topical Analgesic) 2.5%   Topical 4x Daily     omeprazole  20 mg Oral Daily     saccharomyces boulardii  250 mg Oral BID     senna-docusate  1 tablet Oral At Bedtime     sertraline  100 mg Oral Daily     tamsulosin  0.4 mg Oral Daily     Data     Laboratory:  Recent Labs   Lab 05/20/21  0654 05/16/21  1209   WBC  --  7.8   HGB  --  9.5*   HCT  --  28.9*   MCV  --  97    224     Recent Labs   Lab 05/16/21  1209      POTASSIUM 4.3   CHLORIDE 105   CO2 29   ANIONGAP 1*   GLC 95   BUN 19   CR 0.63   GFRESTIMATED 79   GFRESTBLACK >90   DAVE 9.0     No results for input(s): CULT in the last 168 hours.    Imaging:  Recent Results (from the past 24 hour(s))   MR Lumbar Spine w/o & w Contrast    Narrative    MR LUMBAR SPINE  WITHOUT AND WITH CONTRAST 5/19/2021 2:49 PM     HISTORY: Low back pain, > 6 weeks.    TECHNIQUE: Multiplanar multisequence images were obtained through the  lumbar spine without and with contrast. 6 mL of Gadavist given.    COMPARISON: None.    FINDINGS: Five lumbar type vertebral bodies are assumed. There is an  old burst fracture deformity at L1 with 0.7 cm of posterior protrusion  into the canal resulting in some moderate central canal stenosis.  Anteriorly wedge compression fractures are also noted at T11, T12, and  L2. There is abnormal marrow signal intensity within the L3 vertebral  body with some central endplate compression of both the superior and  inferior endplates as well as some minimal wedging. This is most  likely due to an acute benign compression fracture. Small amount of  discogenic marrow change or abnormal marrow signal intensity is also  seen in the superior endplate and inferior endplate of T12 which could  indicate that this fracture is subacute. Paraspinal soft tissues and  visualized bony pelvis are unremarkable. Postcontrast images show some  enhancement along the endplates at T12 and the superior endplate of L2  as well as the majority of the L3 vertebral body. There is also some  signal hyperintensity and enhancement along the superior endplate of  L4 without any compression deformity.    Multilevel degenerative disc and facet disease is also present with  moderate to severe central canal stenosis at L2-L3, and moderate  central canal stenosis at L3-L4.      Impression    IMPRESSION:  1. Acute fracture through the L3 vertebral body with stress response  versus nondisplaced fracture in the superior endplate of L4.  2. Subacute fractures involving T12 and the superior endplate of L2.  3. Old burst fracture of L1 with moderate central canal stenosis.  4. Old compression fracture of T11.  5. Multilevel degenerative disc and facet disease with moderate to  severe central canal stenosis at  L2-L3 and moderate central canal  stenosis at L3-L4.    MINA CURRY MD   MR Thoracic Spine w/o & w Contrast    Narrative    MRI THORACIC SPINE WITHOUT CONTRAST  5/19/2021 3:27 PM     HISTORY: Mid-back pain.    TECHNIQUE: Multiplanar multisequence MRI of the thoracic spine without  contrast.    COMPARISON: CT of the abdomen and pelvis 5/8/2021, CT of the chest,  abdomen and pelvis 5/6/2020.    FINDINGS: There is an acute-appearing compression fracture involving  the T6 vertebral body primarily along the inferior endplate with mild  associated height loss. There is T2/STIR hyperintense signal, T1  hypointense signal and enhancement involving the vertebral body  marrow, consistent with reactive marrow edema/signal change. There is  no significant fracture fragment retropulsion into the spinal canal.    There are multiple chronic compression fracture deformities involving  the thoracic and visualized lumbar spine. Mild chronic wedge  compression deformities of T4, T5, T7 and T8. Moderate-to-severe  chronic compression deformity of T11 and moderate chronic compression  deformity of T12. There is a severe chronic compression fracture of  L1, moderate chronic compression fracture of T12, and  acute/recent-appearing moderate compression fracture of L3. There is  some degree of retropulsion of the fracture fragments of the T11, T12,  L1, L2 and L3 vertebral bodies.    Moderate kyphosis at the thoracolumbar junction related to the  fracture deformities. Alignment is otherwise unremarkable in the  sagittal plane. Multilevel degenerative disc disease, including  moderate disc space narrowing at T8-T9 and mild/moderate disc space  narrowing at T10-11, T11-12, and T12-L1. Small focal fluid collection  within the central aspect of the spinal cord noted most prominently at  the level of T5-T6 (series 6 image 20) measuring up to 1.5-2 mm in the  axial plane and likely representing mild focal dilatation of the  central canal of the  spinal cord. This is seen to a lesser degree at  other levels of the thoracic spine.    There is a small amount of enhancing tissue in the ventral epidural  space at the level of T6-T7 which could be reactive dural enhancement  or sequela of a small recent disc herniation (series 6 image 22). The  soft tissue places mass effect on the ventral thecal sac and abuts the  ventral margin of the spinal cord and contributes to mild spinal canal  stenosis. There is at least moderate/moderate-to-severe spinal canal  stenosis with some degree of flattening of the normal cord contour due  to retropulsion of the L1 vertebral body fracture fragment (series 6  image 51). There is multilevel degenerative facet arthropathy. Varying  degrees of multilevel neural foraminal stenosis, including at least  moderate foraminal narrowing on the right at multiple levels in the  mid to lower thoracic spine bilaterally.      Impression    IMPRESSION:  1. Recent-appearing compression fracture of the T6 vertebral body with  mild height loss.  2. Partially visualized recent-appearing L3 vertebral body fracture,  better characterized on MRI lumbar spine of same session.  3. Multiple chronic compression fractures of the thoracic and lumbar  spine, as described.  4. Probable small age-indeterminate central disc herniation at T6-T7  with mild mass effect on the ventral thecal sac. There appears to be  some mild reactive ventral epidural enhancement adjacent to the disc  at this level.  5. Moderate bordering on moderate-to-severe spinal canal stenosis at  L1, in the setting of retropulsion of the posterosuperior endplate  fracture fragment. There is also moderate focal kyphosis at the  thoracolumbar junction related to the multiple fracture deformities.  Please see separate report from lumbar spine MRI of same day for  additional details.         German Hillman DO MPH  [facility] Hospitalist  [address] [place] [address]  05/20/2021

## 2021-05-20 NOTE — PLAN OF CARE
RN [id number]  A&Ox4, VSS, up SBA GB/W. Several watery loose stools, repeat c. diff test pending. Pain managed w/ lidocaine patches and voltaren/bengay cream. IV invanz.    Plan to discharge to TCU once fitted with orthotics spinal brace today and ins authorizes.

## 2021-05-20 NOTE — PLAN OF CARE
RN - [id number] - Hypertensive otherwise vitally stable. Pt complaining of back pain - relieved with scheduled and PRN medications. Pt requiring multiple bathroom visits - initially with difficulty voiding transitioned to large incontinent stools x3. Pt bladder scanned for 234ml post void. Continues to move with walker and SBA. Plan to discharge to TCU tomorrow.

## 2021-05-20 NOTE — PROVIDER NOTIFICATION
"[id number] MD paged: \"Pt has had 3 loose stools in last 8 hours. Hx of c. diff but negative test on 5/14. IV invanz abx for UTI, no stool softeners given since 5/17. Would you like to test again?\"    9018: Stool sample ordered and enteric precautions initiated.  "

## 2021-05-20 NOTE — PROGRESS NOTES
[facility]  Pain Management Progress Note  Text Page     Assessment & Plan   Daya Ignacio is a 89 year old female who was admitted on 5/8/2021.   PLAN:   1)  Opioids:  -Continue with tramadol 50 mg every 6 hours PRN limit 300 mg /day Cr.Cl > 30, <30 200 mg per day  Consider change to dilaudid 2 mg every 4 hrs prn  if pain not controlled with tramadol under current parameters  Has AE from Oxycodone     Opioids Treatment Goal:   -Improvement in function  -Participate in PT     2)Non-opioid multimodal medication therapy  -Topical:Voltaren 1% Topical Gel 4 times daily, Lidocaine Patch 4% 2 patches at HS, bengay vanishing cream 4 times daily  -N-SAIDS: could consider low dose toradol while inpatient if pain continues to be a concern  -Muscle Relaxants:None indicated  -Adjuvants:Acetaminophen 975 mg every 8 hours , Gabapentin  300 mg at HS and 200 mg daily, 100 mg at noon.  Start 300 mg am and pm 5/21.  -Antidepresants/anxiolytics:Sertraline 100 mg daily as previously on     3)  Non-medication interventions  Positioning, Heating pad PRN, ICE, Relaxation  orthosis bracing per spine consult     4)  Constipation Prophylaxis  Continue to Monitor, Bowel Meds PRN per Constipation Order Set, Senna-S 1 tablet at bedtime     5)  Pain Education  -Opioid safe use, storage and disposal information included in DC AVS     6)  DC Planning   Discussed goal of Opioid therapy as above with patient and nursing staff  Length of therapy is less than 10 days, opioids may be stopped without taper.  Continued outpatient management of pain per PCP  Disposition: TBD likely TCU  Support systems: family  Outpatient Referrals: none at this time  The following risk factors have been identified for unintentional overdose: patient is > 65 years old. Discharge with intra-nasal naloxone if discharged to home with opioids  >40 mg MME/day.  Plan for education prior to discharge.     ASSESSMENT:  1)  Acute on chronic back pain  s/p compression fractures      2)  Patient with chronic back pain, not on chronic opioids  Baseline 0 mg Daily Morphine Equivalent as dispensed and as reported daily use.  Patient has no expected opioid tolerance.      Patient's opioid use in past 24 hours: 150 mg Tramadol  = 12.5 mg Daily Morphine Equivalent     3)  Risk factors for opioid related harms  -Sleep disordered breathing  - Age > 65 years old     4)  Opioid induced side-effects:  -Constipation small BM today   -Sedation     5)  Other/Related:    -Deconditioning  -depression/anxiety on selective serotonin reuptake inhibitor  -Acute Kidney   -Kidney stones  -falls risk    Lizzy Hammer-Bob RN, PGMT-BC,APRN, CNP, ACHPN  Pain Management and Palliative Care   [facility]  Pgr: [phone]    Time Spent on this Encounter   Total unit/floor time 20 minutes, time consisted of the following, examination of the patient, reviewing the record and completing documentation. >50% of time spent in counseling and coordination of care.  Time spend counseling with patient consisted of the following topics, symptom management.  Time spent in coordination of care with Bedside Nurse Ninfa Sharif RN.       Interval History    pain better controlled, still low back focal greater with standing, turning and repositioning  small BM  Participating in Physical Therapy   Spine recommends TLSO, c-diff negative  Voiding well     Review of Systems    CONSTITUTIONAL: NEGATIVE for fever, chills, change in weight  ENT/MOUTH: NEGATIVE for ear, mouth and throat problems  RESP: NEGATIVE for significant cough or SOB  CV: NEGATIVE for chest pain, palpitations or peripheral edema    Physical Exam   Temp:  [97.2  F (36.2  C)-97.9  F (36.6  C)] 97.2  F (36.2  C)  Pulse:  [59-63] 62  Resp:  [18-28] 22  BP: (124-160)/(50-62) 160/62  SpO2:  [92 %-96 %] 95 %  128 lbs 6.4 oz  GEN:  Alert, oriented x 3, appears comfortable, NAD.  HEENT:  Normocephalic/atraumatic, no  scleral icterus, no nasal discharge, mouth moist.  CV:  RRR, S1, S2; no murmurs or other irregularities noted.  +3 DP/PT pulses bilatererally; no edema BLE.  RESP:  Clear to auscultation bilaterally without rales/rhonchi/wheezing/retractions.  Symmetric chest rise on inhalation noted.  Normal respiratory effort.  ABD:  Rounded, soft, non-tender/non-distended.  +BS  EXT:  Edema & pulses as noted above.  CMS intact x 4.     M/S:   Nontender to palpation throughout.    SKIN:  Dry to touch, no exanthems noted in the visualized areas.    NEURO: Symmetric strength +5/5. Dorsiflexion 4+/5 plantar  Flexion 4/5.   Sensation to touch intact all extremities.    PAIN BEHAVIOR: Cooperative  Psych:  Normal affect.  Calm, pleasent cooperative, conversant appropriately.    Medications       acetaminophen  975 mg Oral TID     amLODIPine  10 mg Oral Daily     carvedilol  12.5 mg Oral BID w/meals     cloNIDine  0.05 mg Oral BID     diclofenac  4 g Topical 4x Daily     enoxaparin ANTICOAGULANT  40 mg Subcutaneous Q24H     ertapenem (INVanz) IV  1 g Intravenous Q24H     gabapentin  100 mg Oral Once     [START ON 5/21/2021] gabapentin  300 mg Oral Daily     gabapentin  300 mg Oral At Bedtime     isosorbide dinitrate  10 mg Oral TID     lidocaine  2 patch Transdermal Q24h    And     lidocaine   Transdermal Q8H     lisinopril  30 mg Oral Daily     menthol (Topical Analgesic) 2.5%   Topical 4x Daily     omeprazole  20 mg Oral Daily     saccharomyces boulardii  250 mg Oral BID     senna-docusate  1 tablet Oral At Bedtime     sertraline  100 mg Oral Daily     tamsulosin  0.4 mg Oral Daily       Data   Results for orders placed or performed during the hospital encounter of 05/08/21 (from the past 24 hour(s))   MR Lumbar Spine w/o & w Contrast    Narrative    MR LUMBAR SPINE WITHOUT AND WITH CONTRAST 5/19/2021 2:49 PM     HISTORY: Low back pain, > 6 weeks.    TECHNIQUE: Multiplanar multisequence images were obtained through the  lumbar spine  without and with contrast. 6 mL of Gadavist given.    COMPARISON: None.    FINDINGS: Five lumbar type vertebral bodies are assumed. There is an  old burst fracture deformity at L1 with 0.7 cm of posterior protrusion  into the canal resulting in some moderate central canal stenosis.  Anteriorly wedge compression fractures are also noted at T11, T12, and  L2. There is abnormal marrow signal intensity within the L3 vertebral  body with some central endplate compression of both the superior and  inferior endplates as well as some minimal wedging. This is most  likely due to an acute benign compression fracture. Small amount of  discogenic marrow change or abnormal marrow signal intensity is also  seen in the superior endplate and inferior endplate of T12 which could  indicate that this fracture is subacute. Paraspinal soft tissues and  visualized bony pelvis are unremarkable. Postcontrast images show some  enhancement along the endplates at T12 and the superior endplate of L2  as well as the majority of the L3 vertebral body. There is also some  signal hyperintensity and enhancement along the superior endplate of  L4 without any compression deformity.    Multilevel degenerative disc and facet disease is also present with  moderate to severe central canal stenosis at L2-L3, and moderate  central canal stenosis at L3-L4.      Impression    IMPRESSION:  1. Acute fracture through the L3 vertebral body with stress response  versus nondisplaced fracture in the superior endplate of L4.  2. Subacute fractures involving T12 and the superior endplate of L2.  3. Old burst fracture of L1 with moderate central canal stenosis.  4. Old compression fracture of T11.  5. Multilevel degenerative disc and facet disease with moderate to  severe central canal stenosis at L2-L3 and moderate central canal  stenosis at L3-L4.    MINA CURRY MD   MR Thoracic Spine w/o & w Contrast    Narrative    MRI THORACIC SPINE WITHOUT CONTRAST  5/19/2021  3:27 PM     HISTORY: Mid-back pain.    TECHNIQUE: Multiplanar multisequence MRI of the thoracic spine without  contrast.    COMPARISON: CT of the abdomen and pelvis 5/8/2021, CT of the chest,  abdomen and pelvis 5/6/2020.    FINDINGS: There is an acute-appearing compression fracture involving  the T6 vertebral body primarily along the inferior endplate with mild  associated height loss. There is T2/STIR hyperintense signal, T1  hypointense signal and enhancement involving the vertebral body  marrow, consistent with reactive marrow edema/signal change. There is  no significant fracture fragment retropulsion into the spinal canal.    There are multiple chronic compression fracture deformities involving  the thoracic and visualized lumbar spine. Mild chronic wedge  compression deformities of T4, T5, T7 and T8. Moderate-to-severe  chronic compression deformity of T11 and moderate chronic compression  deformity of T12. There is a severe chronic compression fracture of  L1, moderate chronic compression fracture of T12, and  acute/recent-appearing moderate compression fracture of L3. There is  some degree of retropulsion of the fracture fragments of the T11, T12,  L1, L2 and L3 vertebral bodies.    Moderate kyphosis at the thoracolumbar junction related to the  fracture deformities. Alignment is otherwise unremarkable in the  sagittal plane. Multilevel degenerative disc disease, including  moderate disc space narrowing at T8-T9 and mild/moderate disc space  narrowing at T10-11, T11-12, and T12-L1. Small focal fluid collection  within the central aspect of the spinal cord noted most prominently at  the level of T5-T6 (series 6 image 20) measuring up to 1.5-2 mm in the  axial plane and likely representing mild focal dilatation of the  central canal of the spinal cord. This is seen to a lesser degree at  other levels of the thoracic spine.    There is a small amount of enhancing tissue in the ventral epidural  space at the  level of T6-T7 which could be reactive dural enhancement  or sequela of a small recent disc herniation (series 6 image 22). The  soft tissue places mass effect on the ventral thecal sac and abuts the  ventral margin of the spinal cord and contributes to mild spinal canal  stenosis. There is at least moderate/moderate-to-severe spinal canal  stenosis with some degree of flattening of the normal cord contour due  to retropulsion of the L1 vertebral body fracture fragment (series 6  image 51). There is multilevel degenerative facet arthropathy. Varying  degrees of multilevel neural foraminal stenosis, including at least  moderate foraminal narrowing on the right at multiple levels in the  mid to lower thoracic spine bilaterally.      Impression    IMPRESSION:  1. Recent-appearing compression fracture of the T6 vertebral body with  mild height loss.  2. Partially visualized recent-appearing L3 vertebral body fracture,  better characterized on MRI lumbar spine of same session.  3. Multiple chronic compression fractures of the thoracic and lumbar  spine, as described.  4. Probable small age-indeterminate central disc herniation at T6-T7  with mild mass effect on the ventral thecal sac. There appears to be  some mild reactive ventral epidural enhancement adjacent to the disc  at this level.  5. Moderate bordering on moderate-to-severe spinal canal stenosis at  L1, in the setting of retropulsion of the posterosuperior endplate  fracture fragment. There is also moderate focal kyphosis at the  thoracolumbar junction related to the multiple fracture deformities.  Please see separate report from lumbar spine MRI of same day for  additional details.   Clostridium difficile toxin B PCR    Specimen: Feces   Result Value Ref Range    Specimen Description Feces     C Diff Toxin B PCR Negative NEG^Negative   Platelet count   Result Value Ref Range    Platelet Count 215 150 - 450 10e9/L

## 2021-05-20 NOTE — PLAN OF CARE
Pt A&O, forgetful.  Hypertensive BP other VSS afebrile RA. Up with SBA gait belt and walker. Pt does not like the new Ortho brace. She said she will try again this afternoon when OOB again.  Pain managed with Tylenol  and voltaren/bengay cream and Lidocaine patch  LS clear.  +BS/Flatus. Pt had no c/o SOB or nausea. ID MD discontinued IV Invanze. Stooling less frequent, pt is still retaining urin. Potential discharge to TCU Friday.

## 2021-05-20 NOTE — PROGRESS NOTES
[facility]  Infectious Disease Progress Note          Assessment and Plan:   IMPRESSION:     1.  A 89-year-old female, well known to me from multiple prior admissions with complicated infection and medical history, now admitted with back pain, left flank discomfort, maybe slight cognition changes and possible small bowel obstruction, not perceived to be infection related, but historically has looked similar with prior UTI- related infections including the pain, so not entirely excluded that this is the actual clinical event here.  2.  ESBL positive urine culture with grossly abnormal urine, colonized chronically, but also with major recurrent infection, so hard to exclude completely that this is true infection.  3.  History of chronically colonized urine, prior stones including obstruction and multiple prior major infections including candidemia and bacteremias in the past.  4.  Prior C. diff, but not since 2019.  5.  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  6.  Bilateral total hip arthroplasties as a threatened location, but no prior infections.  7.  Mild chronic renal dysfunction.  8.  LISTED PENICILLIN AND NITROFURANTOIN ALLERGIES.  WE HAVE GIVEN HER SULFA BEFORE WHEN FORCED WHICH LISTED AS ALLERGIC, BUT THIS IS A FAMILY HISTORY OF THAT, NOT A PERSONAL HISTORY.     RECOMMENDATIONS:     1.  Difficult to know if infection issue however, adding everything together, certainly would treat at this point.  We will start ertapenem and plan on at least a short course.  2.  Get blood cultures before we start ertapenem.  Historically, in very similar circumstances, she has actually been bacteremic when this is occurring, has not had any effective agents against the organism yet, so would still likely find the organism on the Cipro she is on.  Obviously, discontinue Cipro.  3.  Previously addressed the overall picture.  There has been a large amount of interest in trying to do prophylactic  antibiotics, but they have no role to play here whatsoever.  She has highly resistant organisms, so despite getting recurrent sepsis and major infections, prophylaxis is not indicated and counterproductive.  In addition, the problematic long term situation here is whether to treat her asymptomatic bacteriuria given that she has had multiple recurrent major infections.  Usual practice is best not to treat her when it is truly asymptomatic bacteriuria; that does not mean, of course, here that asymptomatic bacteriuria is a benign condition.  It certainly is a threat of bacteremia/ hips, etc.  4 Back same and unlikely kidney/infection related presenting issue, day 10 erta , adequate tx DC  Rash and wd back, not clear ? Does not look like zoster or infection, not likely the pain issue  Some diarrhea C diff neg/ tx symptomatically           Interval History:   no new complaints and doing well except pain; no cp, sob, n/v/d, or abd pain. Back pain about same no fever or fever sxs  Diarrhea resolved C dif neg              Medications:       acetaminophen  975 mg Oral TID     amLODIPine  10 mg Oral Daily     carvedilol  12.5 mg Oral BID w/meals     cloNIDine  0.05 mg Oral BID     diclofenac  4 g Topical 4x Daily     enoxaparin ANTICOAGULANT  40 mg Subcutaneous Q24H     gabapentin  100 mg Oral Once     [START ON 5/21/2021] gabapentin  300 mg Oral Daily     gabapentin  300 mg Oral At Bedtime     isosorbide dinitrate  10 mg Oral TID     lidocaine  2 patch Transdermal Q24h    And     lidocaine   Transdermal Q8H     lisinopril  30 mg Oral Daily     menthol (Topical Analgesic) 2.5%   Topical 4x Daily     omeprazole  20 mg Oral Daily     saccharomyces boulardii  250 mg Oral BID     senna-docusate  1 tablet Oral At Bedtime     sertraline  100 mg Oral Daily     tamsulosin  0.4 mg Oral Daily                  Physical Exam:   Blood pressure (!) 160/62, pulse 62, temperature 97.2  F (36.2  C), temperature source Temporal, resp. rate 22,  weight 58.2 kg (128 lb 6.4 oz), SpO2 95 %, not currently breastfeeding.  Wt Readings from Last 2 Encounters:   05/08/21 58.2 kg (128 lb 6.4 oz)   11/18/20 57.6 kg (127 lb)     Vital Signs with Ranges  Temp:  [97.2  F (36.2  C)-97.9  F (36.6  C)] 97.2  F (36.2  C)  Pulse:  [59-63] 62  Resp:  [18-28] 22  BP: (124-160)/(50-62) 160/62  SpO2:  [92 %-96 %] 95 %    Constitutional: Awake, alert, cooperative, no apparent distress   Lungs: Clear to auscultation bilaterally, no crackles or wheezing   Cardiovascular: Regular rate and rhythm, normal S1 and S2, and no murmur noted   Abdomen: Normal bowel sounds, soft, non-distended, non-tender   Skin: No rashes, no cyanosis, no edema   Other:           Data:   All microbiology laboratory data reviewed.  Recent Labs   Lab Test 05/20/21  0654 05/16/21  1209 05/12/21  0751 05/09/21  0557   WBC  --  7.8 7.3 8.0   HGB  --  9.5* 10.0* 10.2*   HCT  --  28.9* 31.7* 31.9*   MCV  --  97 97 97    224 205 196     Recent Labs   Lab Test 05/16/21  1209 05/12/21  0751 05/09/21  0557   CR 0.63 0.63 0.80     No lab results found.  Recent Labs   Lab Test 05/11/21  1613 05/11/21  1607 05/09/21  0120 11/02/20  0819 11/02/20  0817 10/31/20  1130 10/31/20  0941 10/31/20  0938 05/09/20  0944   CULT No growth No growth >100,000 colonies/mL  Aerococcus urinae  Identification obtained by MALDI-TOF mass spectrometry research use only database. Test   characteristics determined and verified by the [facility] Diagnostic Laboratory   ([facility]) [place], [place].  Aerococcus is usually susceptible to penicillin, cephalosporins, tetracycline and   vancomycin.  *  >100,000 colonies/mL  Escherichia coli ESBL  ESBL (extended beta lactamase) producing organisms require contact precautions.  * No growth No growth <10,000 colonies/mL  Proteus mirabilis  *  <10,000 colonies/mL  Enterococcus faecium  *  <10,000 colonies/mL  Strain 2  Enterococcus faecium  *  <10,000 colonies/mL  Candida glabrata  complex  Susceptibility testing not routinely done  *  ID requested by Dr. Camejo on 11.2.20 at 1311. bw Cultured on the 1st day of incubation:  Candida glabrata complex  *  Critical Value/Significant Value, preliminary result only, called to and read back by  Elke Peña RN at 0155 11.2.20. amd   Cultured on the 1st day of incubation:  Candida glabrata complex  *  Critical Value/Significant Value, preliminary result only, called to and read back by  Elke Peña RN 11/2/20 @ 0250 TF    Susceptibility testing done on previous specimen No growth

## 2021-05-20 NOTE — PROGRESS NOTES
Came to fit tlso.  Marissa Hall assisted positioning the patient and the orthosis.  I asked patient why she didn't like her corset as they are typically very easy to work with and comfortable.  Patient was able provide answer.  Patient complaint of discomfort from the orthosis in her low back but not from pressure.  I could not determine the cause of it. Patient asking for smaller low profile orthosis. Patient admitted to having a custom plastic at one time but wasn't a fan but it worked.  Because of the isolation status I did not take the orthosis as I cant remove it from the isolation room.  Patient is going to try orthosis again.  I will follow later today or tomorrow AM.  Please call orthotics if questions.  Arnold RAMAN.

## 2021-05-21 ENCOUNTER — APPOINTMENT (OUTPATIENT)
Dept: GENERAL RADIOLOGY | Facility: CLINIC | Age: 86
DRG: 690 | End: 2021-05-21
Attending: INTERNAL MEDICINE
Payer: COMMERCIAL

## 2021-05-21 PROCEDURE — 250N000011 HC RX IP 250 OP 636: Performed by: HOSPITALIST

## 2021-05-21 PROCEDURE — 250N000013 HC RX MED GY IP 250 OP 250 PS 637: Performed by: HOSPITALIST

## 2021-05-21 PROCEDURE — 250N000013 HC RX MED GY IP 250 OP 250 PS 637: Performed by: INTERNAL MEDICINE

## 2021-05-21 PROCEDURE — 250N000013 HC RX MED GY IP 250 OP 250 PS 637: Performed by: NURSE PRACTITIONER

## 2021-05-21 PROCEDURE — 120N000001 HC R&B MED SURG/OB

## 2021-05-21 PROCEDURE — 74019 RADEX ABDOMEN 2 VIEWS: CPT

## 2021-05-21 PROCEDURE — 99232 SBSQ HOSP IP/OBS MODERATE 35: CPT | Performed by: NURSE PRACTITIONER

## 2021-05-21 PROCEDURE — 99233 SBSQ HOSP IP/OBS HIGH 50: CPT | Performed by: INTERNAL MEDICINE

## 2021-05-21 RX ORDER — ACETAMINOPHEN 325 MG/1
650 TABLET ORAL 3 TIMES DAILY
Status: DISCONTINUED | OUTPATIENT
Start: 2021-05-21 | End: 2021-05-22 | Stop reason: HOSPADM

## 2021-05-21 RX ORDER — HYDROMORPHONE HYDROCHLORIDE 2 MG/1
2 TABLET ORAL EVERY 4 HOURS PRN
Status: DISCONTINUED | OUTPATIENT
Start: 2021-05-21 | End: 2021-05-22 | Stop reason: HOSPADM

## 2021-05-21 RX ADMIN — TRAMADOL HYDROCHLORIDE 50 MG: 50 TABLET, COATED ORAL at 00:06

## 2021-05-21 RX ADMIN — ISOSORBIDE DINITRATE 10 MG: 10 TABLET ORAL at 20:23

## 2021-05-21 RX ADMIN — CARVEDILOL 18.75 MG: 12.5 TABLET, FILM COATED ORAL at 09:54

## 2021-05-21 RX ADMIN — LISINOPRIL 30 MG: 20 TABLET ORAL at 09:54

## 2021-05-21 RX ADMIN — Medication 250 MG: at 20:23

## 2021-05-21 RX ADMIN — Medication: at 12:04

## 2021-05-21 RX ADMIN — DICLOFENAC SODIUM 4 G: 10 GEL TOPICAL at 12:04

## 2021-05-21 RX ADMIN — LIDOCAINE 2 PATCH: 560 PATCH PERCUTANEOUS; TOPICAL; TRANSDERMAL at 20:23

## 2021-05-21 RX ADMIN — OMEPRAZOLE 20 MG: 20 CAPSULE, DELAYED RELEASE ORAL at 09:54

## 2021-05-21 RX ADMIN — CLONIDINE HYDROCHLORIDE 0.05 MG: 0.1 TABLET ORAL at 09:53

## 2021-05-21 RX ADMIN — ACETAMINOPHEN 975 MG: 325 TABLET, FILM COATED ORAL at 00:02

## 2021-05-21 RX ADMIN — AMLODIPINE BESYLATE 10 MG: 10 TABLET ORAL at 09:53

## 2021-05-21 RX ADMIN — DICLOFENAC SODIUM 4 G: 10 GEL TOPICAL at 20:25

## 2021-05-21 RX ADMIN — GABAPENTIN 300 MG: 300 CAPSULE ORAL at 23:12

## 2021-05-21 RX ADMIN — ACETAMINOPHEN 650 MG: 325 TABLET, FILM COATED ORAL at 15:36

## 2021-05-21 RX ADMIN — SERTRALINE HYDROCHLORIDE 100 MG: 100 TABLET ORAL at 09:54

## 2021-05-21 RX ADMIN — Medication: at 20:30

## 2021-05-21 RX ADMIN — ACETAMINOPHEN 975 MG: 325 TABLET, FILM COATED ORAL at 09:53

## 2021-05-21 RX ADMIN — ISOSORBIDE DINITRATE 10 MG: 10 TABLET ORAL at 09:53

## 2021-05-21 RX ADMIN — ISOSORBIDE DINITRATE 10 MG: 10 TABLET ORAL at 13:41

## 2021-05-21 RX ADMIN — CARVEDILOL 18.75 MG: 12.5 TABLET, FILM COATED ORAL at 17:13

## 2021-05-21 RX ADMIN — Medication: at 15:51

## 2021-05-21 RX ADMIN — Medication: at 09:59

## 2021-05-21 RX ADMIN — DICLOFENAC SODIUM 4 G: 10 GEL TOPICAL at 15:51

## 2021-05-21 RX ADMIN — ENOXAPARIN SODIUM 40 MG: 40 INJECTION SUBCUTANEOUS at 12:09

## 2021-05-21 RX ADMIN — Medication 250 MG: at 09:53

## 2021-05-21 RX ADMIN — GABAPENTIN 300 MG: 300 CAPSULE ORAL at 00:03

## 2021-05-21 RX ADMIN — GABAPENTIN 300 MG: 300 CAPSULE ORAL at 09:53

## 2021-05-21 RX ADMIN — HYDROMORPHONE HYDROCHLORIDE 2 MG: 2 TABLET ORAL at 20:23

## 2021-05-21 RX ADMIN — CLONIDINE HYDROCHLORIDE 0.05 MG: 0.1 TABLET ORAL at 20:23

## 2021-05-21 RX ADMIN — ACETAMINOPHEN 650 MG: 325 TABLET, FILM COATED ORAL at 23:12

## 2021-05-21 RX ADMIN — DICLOFENAC SODIUM 4 G: 10 GEL TOPICAL at 09:59

## 2021-05-21 RX ADMIN — HYDROMORPHONE HYDROCHLORIDE 2 MG: 2 TABLET ORAL at 10:26

## 2021-05-21 RX ADMIN — TAMSULOSIN HYDROCHLORIDE 0.4 MG: 0.4 CAPSULE ORAL at 09:54

## 2021-05-21 ASSESSMENT — ACTIVITIES OF DAILY LIVING (ADL)
ADLS_ACUITY_SCORE: 18
ADLS_ACUITY_SCORE: 17
ADLS_ACUITY_SCORE: 17
ADLS_ACUITY_SCORE: 18
ADLS_ACUITY_SCORE: 17
ADLS_ACUITY_SCORE: 17

## 2021-05-21 NOTE — PROGRESS NOTES
.Care Management Follow Up    Length of Stay (days): 13    Expected Discharge Date: per discussion this morning with MD, pt will not be ready for discharge. Discharge date to be determined.           Patient plan of care discussed at interdisciplinary rounds: Yes    Anticipated Discharge Disposition:  [facility]     Anticipated Discharge Services:  Rehab   Anticipated Discharge DME:  To be determined at TCU    Patient/family educated on Medicare website which has current facility and service quality ratings:  Yes   Education Provided on the Discharge Plan:  Spoke by phone this morning with son to inform that per MD pt will not be medically ready for discharge today.       Additional Information:    Medical transport previously scheduled for today at 1030 has been cancelled, [facility] has been notified that pt will not discharge today.       Plan:    DOM continuing to follow for support and discharge planning.       .    ROSE Jon   Inpatient Care Coordination   [facility]     [phone]

## 2021-05-21 NOTE — PROGRESS NOTES
Here to look at measuring for a custom tlso.  Arrived to find patient in chair with orthosis on and doing well with it.  I notified Lizzy KWOK of pain and she is ok with current orthosis if patient doing well.  Please contact orthotics if any questions.  Arnold RAMAN.

## 2021-05-21 NOTE — PROGRESS NOTES
[facility]  Infectious Disease Progress Note          Assessment and Plan:   IMPRESSION:     1.  A 89-year-old female, well known to me from multiple prior admissions with complicated infection and medical history, now admitted with back pain, left flank discomfort, maybe slight cognition changes and possible small bowel obstruction, not perceived to be infection related, but historically has looked similar with prior UTI- related infections including the pain, so not entirely excluded that this is the actual clinical event here.  2.  ESBL positive urine culture with grossly abnormal urine, colonized chronically, but also with major recurrent infection, so hard to exclude completely that this is true infection.  3.  History of chronically colonized urine, prior stones including obstruction and multiple prior major infections including candidemia and bacteremias in the past.  4.  Prior C. diff, but not since 2019.  5.  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  6.  Bilateral total hip arthroplasties as a threatened location, but no prior infections.  7.  Mild chronic renal dysfunction.  8.  LISTED PENICILLIN AND NITROFURANTOIN ALLERGIES.  WE HAVE GIVEN HER SULFA BEFORE WHEN FORCED WHICH LISTED AS ALLERGIC, BUT THIS IS A FAMILY HISTORY OF THAT, NOT A PERSONAL HISTORY.     RECOMMENDATIONS:     1.  Difficult to know if infection issue however, adding everything together, certainly would treat at this point.  We will start ertapenem and plan on at least a short course.  2.  Get blood cultures before we start ertapenem.  Historically, in very similar circumstances, she has actually been bacteremic when this is occurring, has not had any effective agents against the organism yet, so would still likely find the organism on the Cipro she is on.  Obviously, discontinue Cipro.  3.  Previously addressed the overall picture.  There has been a large amount of interest in trying to do prophylactic  antibiotics, but they have no role to play here whatsoever.  She has highly resistant organisms, so despite getting recurrent sepsis and major infections, prophylaxis is not indicated and counterproductive.  In addition, the problematic long term situation here is whether to treat her asymptomatic bacteriuria given that she has had multiple recurrent major infections.  Usual practice is best not to treat her when it is truly asymptomatic bacteriuria; that does not mean, of course, here that asymptomatic bacteriuria is a benign condition.  It certainly is a threat of bacteremia/ hips, etc.  4 Back same and unlikely kidney/infection related presenting issue, day 10 erta , adequate tx DC  Rash and wd back, not clear ? Does not look like zoster or infection, not likely the pain issue  Some diarrhea C diff neg/ tx symptomatically  Will sign off call if issues           Interval History:   no new complaints and doing well except pain; no cp, sob, n/v/d, or abd pain. Back pain about same no fever or fever sxs  Diarrhea resolved C dif neg              Medications:       acetaminophen  975 mg Oral TID     amLODIPine  10 mg Oral Daily     carvedilol  18.75 mg Oral BID w/meals     cloNIDine  0.05 mg Oral BID     diclofenac  4 g Topical 4x Daily     enoxaparin ANTICOAGULANT  40 mg Subcutaneous Q24H     gabapentin  300 mg Oral Daily     gabapentin  300 mg Oral At Bedtime     isosorbide dinitrate  10 mg Oral TID     lidocaine  2 patch Transdermal Q24h    And     lidocaine   Transdermal Q8H     lisinopril  30 mg Oral Daily     menthol (Topical Analgesic) 2.5%   Topical 4x Daily     omeprazole  20 mg Oral Daily     saccharomyces boulardii  250 mg Oral BID     senna-docusate  1 tablet Oral At Bedtime     sertraline  100 mg Oral Daily     tamsulosin  0.4 mg Oral Daily                  Physical Exam:   Blood pressure (!) 165/55, pulse 65, temperature 97.4  F (36.3  C), temperature source Temporal, resp. rate 20, weight 58.2 kg (128 lb  6.4 oz), SpO2 97 %, not currently breastfeeding.  Wt Readings from Last 2 Encounters:   05/08/21 58.2 kg (128 lb 6.4 oz)   11/18/20 57.6 kg (127 lb)     Vital Signs with Ranges  Temp:  [96.4  F (35.8  C)-97.6  F (36.4  C)] 97.4  F (36.3  C)  Pulse:  [61-69] 65  Resp:  [18-20] 20  BP: (150-165)/(53-69) 165/55  SpO2:  [95 %-97 %] 97 %    Constitutional: Awake, alert, cooperative, no apparent distress   Lungs: Clear to auscultation bilaterally, no crackles or wheezing   Cardiovascular: Regular rate and rhythm, normal S1 and S2, and no murmur noted   Abdomen: Normal bowel sounds, soft, non-distended, non-tender   Skin: No rashes, no cyanosis, no edema   Other:           Data:   All microbiology laboratory data reviewed.  Recent Labs   Lab Test 05/20/21  0654 05/16/21  1209 05/12/21  0751 05/09/21  0557   WBC  --  7.8 7.3 8.0   HGB  --  9.5* 10.0* 10.2*   HCT  --  28.9* 31.7* 31.9*   MCV  --  97 97 97    224 205 196     Recent Labs   Lab Test 05/16/21  1209 05/12/21  0751 05/09/21  0557   CR 0.63 0.63 0.80     No lab results found.  Recent Labs   Lab Test 05/11/21  1613 05/11/21  1607 05/09/21  0120 11/02/20  0819 11/02/20  0817 10/31/20  1130 10/31/20  0941 10/31/20  0938 05/09/20  0944   CULT No growth No growth >100,000 colonies/mL  Aerococcus urinae  Identification obtained by MALDI-TOF mass spectrometry research use only database. Test   characteristics determined and verified by the [facility]   ([facility]) [place], [place].  Aerococcus is usually susceptible to penicillin, cephalosporins, tetracycline and   vancomycin.  *  >100,000 colonies/mL  Escherichia coli ESBL  ESBL (extended beta lactamase) producing organisms require contact precautions.  * No growth No growth <10,000 colonies/mL  Proteus mirabilis  *  <10,000 colonies/mL  Enterococcus faecium  *  <10,000 colonies/mL  Strain 2  Enterococcus faecium  *  <10,000 colonies/mL  Candida glabrata complex  Susceptibility testing  not routinely done  *  ID requested by Dr. Camejo on 11.2.20 at 1311. bw Cultured on the 1st day of incubation:  Candida glabrata complex  *  Critical Value/Significant Value, preliminary result only, called to and read back by  Elke Peña RN at 0155 11.2.20. amd   Cultured on the 1st day of incubation:  Candida glabrata complex  *  Critical Value/Significant Value, preliminary result only, called to and read back by  Elke Peña RN 11/2/20 @ 0250 TF    Susceptibility testing done on previous specimen No growth

## 2021-05-21 NOTE — PLAN OF CARE
"Alert, oriented x4, mild forgetful.   Vitals stable.   No stools today. No abd pain. Frequent up to the bathroom trips this morning thinking she needed to void, but no voids of urine this morning. Vallecillo catheter placed per order. Good urine output 250cc yellow with some pink tinged. After the urine drained for a few hours- urine now clear, yellow.   Had been up to the bathroom 5 times prior to 0930am- and patient becoming weaker in the legs. 1 assist, walker, GB for ambulation. Able to move bilateral legs off of the bed. Strong dorsi and planter flexions. Neuropathy baseline to feet. No edema.   Dressing to posterior back.   Declined to use both braces that are already at the bedside. She states she wants \"something soft, and yes I want the turtle shell type brace because I can put that on and off myself.\" I talked with patient and educated patient the brace is harder plastic from chest to kayce area. She is still voicing that she wants the hard shell brace.   Rates pain 7-8/10 but she is resting between cares and sleeping soundly. 1 dilaudid provided. Tylenol and cream applied to her back.   Bed alarm on for safety.   1553 ambulated in hallway with 1 assist min, walker, GB, black brace on 75 feet. Tolerated well. She noted \"this brace is ok right now. I don't mind it.\" vallecillo drains well. Pain improved at this time. Able to sit up in the chair now brace is on.   "

## 2021-05-21 NOTE — PROGRESS NOTES
[facility]  Pain Management Progress Note  Text Page     Assessment & Plan   Daya Ignacio is a 89 year old female who was admitted on 5/8/2021.   PLAN:   1)  Opioids:  - stop tramadol and start dilaudid 2 mg every 4 hrs prn  if   Has AE from Oxycodone, hydrocodone not recommended in acute on chronic kidney      Opioids Treatment Goal:   -Improvement in function  -Participate in PT     2)Non-opioid multimodal medication therapy  -Topical:Voltaren 1% Topical Gel 4 times daily, Lidocaine Patch 4% 2 patches at HS, bengay vanishing cream 4 times daily-- continue at discharge   -N-SAIDS:  -Muscle Relaxants:None indicated  -Adjuvants: reduce Acetaminophen 650 mg every 8 hours , Gabapentin 300 mg am and pm.  -Antidepresants/anxiolytics:Sertraline 100 mg daily as previously on     3)  Non-medication interventions  Positioning, Heating pad PRN, ICE, Relaxation  orthosis bracing per spine consult     4)  Constipation Prophylaxis  Continue to Monitor, Bowel Meds PRN per Constipation Order Set, Senna-S 1 tablet at bedtime     5)  Pain Education  -Opioid safe use, storage and disposal information included in DC AVS     6)  DC Planning   Discussed goal of Opioid therapy as above with patient and nursing staff  Length of therapy is less than 10 days, opioids may be stopped without taper.  Continued outpatient management of pain per PCP  Disposition: TBD likely TCU  Support systems: family  Outpatient Referrals: none at this time  The following risk factors have been identified for unintentional overdose: patient is > 65 years old. Discharge with intra-nasal naloxone if discharged to home with opioids  >40 mg MME/day.  Plan for education prior to discharge.     ASSESSMENT:  1)  Acute on chronic back pain s/p compression fractures      2)  Patient with chronic back pain, not on chronic opioids  Baseline 0 mg Daily Morphine Equivalent as dispensed and as reported daily use.  Patient has no expected opioid  tolerance.      Patient's opioid use in past 24 hours: 100 mg Tramadol  = 25 mg Daily Morphine Equivalent     3)  Risk factors for opioid related harms  -Sleep disordered breathing  - Age > 65 years old     4)  Opioid induced side-effects:  -Constipation small BM today   -Sedation     5)  Other/Related:    -Deconditioning  -depression/anxiety on selective serotonin reuptake inhibitor  -Acute Kidney currently resolved now now with retention  -Kidney stones  -falls risk    Lizzy Porter RN, PGMT-BC,APRN, CNP, ACHPN  Pain Management and Palliative Care   [facility]  Pgr: [phone]    Time Spent on this Encounter   Total unit/floor time 25 minutes, time consisted of the following, examination of the patient, reviewing the record and completing documentation. >50% of time spent in counseling and coordination of care.  Time spend counseling with patient consisted of the following topics, symptom management.  Time spent in coordination of care with Bedside Nurse Jazmine Fernández RN and Hospitalist  Christiano Mcmanus MD. Orthotist Abilio Delgado    Interval History   Pain 8/10. New brace still too uncomfortable-- iked TLSO used in past,   Pain still low back focal greater with standing, turning and repositioning  small BM  Frequent urination with residual volumes  Participating in Physical Therapy   c-diff negative      Review of Systems    CONSTITUTIONAL: NEGATIVE for fever, chills, change in weight  ENT/MOUTH: NEGATIVE for ear, mouth and throat problems  RESP: NEGATIVE for significant cough or SOB  CV: NEGATIVE for chest pain, palpitations or peripheral edema    Physical Exam   Temp:  [96.4  F (35.8  C)-97.6  F (36.4  C)] 97.4  F (36.3  C)  Pulse:  [61-69] 65  Resp:  [18-20] 20  BP: (150-165)/(53-69) 165/55  SpO2:  [95 %-97 %] 97 %  128 lbs 6.4 oz  GEN:  Alert, oriented x 3, appears comfortable, NAD.  HEENT:  Normocephalic/atraumatic, no scleral icterus, no nasal discharge, mouth  moist.  CV:  RRR, S1, S2; no murmurs or other irregularities noted.  +3 DP/PT pulses bilatererally; no edema BLE.  RESP:  Clear to auscultation bilaterally without rales/rhonchi/wheezing/retractions.  Symmetric chest rise on inhalation noted.  Normal respiratory effort.  ABD:  Rounded, soft, non-tender/non-distended.  Decreased BS  EXT:  Edema & pulses as noted above.  CMS intact x 4.     M/S:   Nontender to palpation throughout.    SKIN:  Dry to touch, no exanthems noted in the visualized areas.    NEURO: Symmetric strength +5/5. Dorsiflexion 4+/5 plantar  Flexion 4/5.   Sensation to touch intact all extremities.    PAIN BEHAVIOR: Cooperative  Psych:  Normal affect.  Calm, pleasent cooperative, conversant appropriately.    Medications       acetaminophen  975 mg Oral TID     amLODIPine  10 mg Oral Daily     carvedilol  18.75 mg Oral BID w/meals     cloNIDine  0.05 mg Oral BID     diclofenac  4 g Topical 4x Daily     enoxaparin ANTICOAGULANT  40 mg Subcutaneous Q24H     gabapentin  300 mg Oral Daily     gabapentin  300 mg Oral At Bedtime     isosorbide dinitrate  10 mg Oral TID     lidocaine  2 patch Transdermal Q24h    And     lidocaine   Transdermal Q8H     lisinopril  30 mg Oral Daily     menthol (Topical Analgesic) 2.5%   Topical 4x Daily     omeprazole  20 mg Oral Daily     saccharomyces boulardii  250 mg Oral BID     senna-docusate  1 tablet Oral At Bedtime     sertraline  100 mg Oral Daily     tamsulosin  0.4 mg Oral Daily       Data   No results found for this or any previous visit (from the past 24 hour(s)).

## 2021-05-21 NOTE — PROGRESS NOTES
[facility]  Hospitalist Progress Note  Patient Name: Daya Ignacio    MRN: [id number]  Provider: Andre Mcmanus MD  05/21/21             Assessment and Plan:      Summary of Stay: Daya Ignacio is a 89 year old female with history of hypertension, depression, cognitive impairment, nephrolithiasis, lumbar compression fractures, chronic pain, and recurrent drug resistant UTIs.  She presented to the [facility] ED on 5/8/2021 with back pain.  Urinalysis was abnormal and urine culture grew Aerococcus and ESBL E. coli.  She was treated with ertapenem and infectious disease followed.  CT showed no evidence of obstructing ureteral calculi or hydronephrosis.  She had a left flank rash of unclear significance.  It seemed consistent with a large blister that ruptured.  Topical products were provided and WOC followed.  Pain was intermittent and improved with topical analgesics.  She was found to have urinary retention which was treated with intermittent straight catheterization but persisted.  Back pain continued so MRI of the thoracic and lumbar spine was performed and showed numerous compression fractures.  Spine surgery was consulted and recommended bracing.  Daya was seen by PT and OT and TCU was recommended on discharge. Discharge was tentatively arranged for 5/21/21 but Daya developed diarrhea and had persistent urine retention.      Problem List:   1. Aerococcus and ESBL E. coli UTI: Appreciate infectious disease consultation.  Completed a course of ertapenem while here.   2. Urine retention, persistent, likely due to UTI. PVR US have been 380-620. Almanza was placed today. Plan on Urology follow up in 2 weeks for voiding trial.   3. Mid back pain due to numerous vertebral fractures: MRI showed L3 compression fracture, possible acute fracture L4, subacute T12 and L2 fractures, old L1 burst fracture, and old compression fracture of T11.  CT showed no obstructing ureteral calculi nor hydronephrosis but there is  nonobstructing intrarenal calculi.  Appreciate pain management consultation. New TLSO brace to be supplied today. Continue lidoderm patches. Continue scheduled Tylenol. Prn dilaudid per pain management instead of Ultram.  4. Left flank rash: Per previous documentation appears to be a blister that has since ruptured.  WO consult and recommendations for wound care appreciated.  Thought to not be consistent with shingles.   5. Possible early partial SBO versus ileus by imaging- but no physical findings of either. AXR today showed no signs of obstruction.  6. Loose stools yesterday- improved today. C diff testing was negative. Possibly due to resolving partial obstructive process? Monitor.  7. Hypertension: Blood pressure reasonable.  Continue pta carvedilol, lisinopril, Imdur, and amlodipine.  8. Depression: Continue pta sertraline.  9. Chronic adnexal cyst: Outpatient evaluation/monitoring.       DVT Prophylaxis: Enoxaparin (Lovenox) SQ  Code Status: No CPR- Do NOT Intubate  Diet: Regular Diet Adult  Snacks/Supplements Adult: Ensure Enlive; With Meals    Almanza Catheter: placed 5/21/21 for retention  Disposition: Possible discharge to TCU tomorrow  Incidental Findings: As above- chronic adnexal cyst.        Interval History:      Felt worse today than yesterday with frequent voiding overnight and multiple episodes of loose stools yesterday. Still with back pain. Some nausea this am. No chest pain, ab pain, or dysuria.                  Physical Exam:      Last Vital Signs:  /42   Pulse 68   Temp 97.4  F (36.3  C) (Temporal)   Resp 18   Wt 58.2 kg (128 lb 6.4 oz)   SpO2 97%   BMI 25.08 kg/m      Intake/Output Summary (Last 24 hours) at 5/21/2021 1449  Last data filed at 5/21/2021 1300  Gross per 24 hour   Intake 1410 ml   Output 1175 ml   Net 235 ml       GENERAL:  Comfortable appearing. Cooperative.  PSYCH: pleasant, oriented, No acute distress.  EYES: PERRLA, Normal conjunctiva.  HEART:  Regular rate and  rhythm. No JVD. Pulses normal. No edema.  LUNGS:  Clear to auscultation, normal Respiratory effort.  ABDOMEN:  Soft, no hepatosplenomegaly, normal bowel sounds.  EXTREMETIES: No clubbing, cyanosis or ischemia  SKIN:  Dry to touch, No rash.           Medications:      All current medications were reviewed.         Data:      All new lab and imaging data was reviewed.   Labs:  No results for input(s): CULT in the last 168 hours.       Lab Results   Component Value Date     05/16/2021     05/12/2021     05/09/2021    Lab Results   Component Value Date    CHLORIDE 105 05/16/2021    CHLORIDE 105 05/12/2021    CHLORIDE 106 05/09/2021    Lab Results   Component Value Date    BUN 19 05/16/2021    BUN 17 05/12/2021    BUN 26 05/09/2021      Lab Results   Component Value Date    POTASSIUM 4.3 05/16/2021    POTASSIUM 4.0 05/12/2021    POTASSIUM 4.7 05/09/2021    Lab Results   Component Value Date    CO2 29 05/16/2021    CO2 27 05/12/2021    CO2 28 05/09/2021    Lab Results   Component Value Date    CR 0.63 05/16/2021    CR 0.63 05/12/2021    CR 0.80 05/09/2021        Recent Labs   Lab 05/20/21  0654 05/16/21  1209   WBC  --  7.8   HGB  --  9.5*   HCT  --  28.9*   MCV  --  97    224

## 2021-05-21 NOTE — PLAN OF CARE
PT: Attempted to see pt in AM for PT, pt requesting to rest as she was up to the BR frequently overnight.

## 2021-05-21 NOTE — PLAN OF CARE
RN [phone]  Pt A&Ox4, RA, SL, up SBA GB/W. Frequent trips to the bathroom, max  ml, s/c for 550 ml @ 0540. No episodes of incontinence. Pain managed w/ lidocaine patches, voltaren/bengay cream, and prn ultram.    Plan to discharge to [facility] via [facility] @ 1030 today.

## 2021-05-22 VITALS
WEIGHT: 128.4 LBS | RESPIRATION RATE: 16 BRPM | OXYGEN SATURATION: 97 % | SYSTOLIC BLOOD PRESSURE: 138 MMHG | TEMPERATURE: 97.2 F | DIASTOLIC BLOOD PRESSURE: 50 MMHG | HEART RATE: 73 BPM | BODY MASS INDEX: 25.08 KG/M2

## 2021-05-22 LAB
ANION GAP SERPL CALCULATED.3IONS-SCNC: 2 MMOL/L (ref 3–14)
BUN SERPL-MCNC: 21 MG/DL (ref 7–30)
CALCIUM SERPL-MCNC: 9.2 MG/DL (ref 8.5–10.1)
CHLORIDE SERPL-SCNC: 102 MMOL/L (ref 94–109)
CO2 SERPL-SCNC: 31 MMOL/L (ref 20–32)
CREAT SERPL-MCNC: 0.69 MG/DL (ref 0.52–1.04)
ERYTHROCYTE [DISTWIDTH] IN BLOOD BY AUTOMATED COUNT: 14.1 % (ref 10–15)
GFR SERPL CREATININE-BSD FRML MDRD: 76 ML/MIN/{1.73_M2}
GLUCOSE SERPL-MCNC: 86 MG/DL (ref 70–99)
HCT VFR BLD AUTO: 28.2 % (ref 35–47)
HGB BLD-MCNC: 9.2 G/DL (ref 11.7–15.7)
MCH RBC QN AUTO: 30.4 PG (ref 26.5–33)
MCHC RBC AUTO-ENTMCNC: 32.6 G/DL (ref 31.5–36.5)
MCV RBC AUTO: 93 FL (ref 78–100)
PLATELET # BLD AUTO: 222 10E9/L (ref 150–450)
POTASSIUM SERPL-SCNC: 4.4 MMOL/L (ref 3.4–5.3)
RBC # BLD AUTO: 3.03 10E12/L (ref 3.8–5.2)
SODIUM SERPL-SCNC: 135 MMOL/L (ref 133–144)
WBC # BLD AUTO: 5.5 10E9/L (ref 4–11)

## 2021-05-22 PROCEDURE — 250N000013 HC RX MED GY IP 250 OP 250 PS 637: Performed by: NURSE PRACTITIONER

## 2021-05-22 PROCEDURE — 250N000013 HC RX MED GY IP 250 OP 250 PS 637: Performed by: HOSPITALIST

## 2021-05-22 PROCEDURE — 85027 COMPLETE CBC AUTOMATED: CPT | Performed by: INTERNAL MEDICINE

## 2021-05-22 PROCEDURE — 99239 HOSP IP/OBS DSCHRG MGMT >30: CPT | Performed by: INTERNAL MEDICINE

## 2021-05-22 PROCEDURE — 250N000013 HC RX MED GY IP 250 OP 250 PS 637: Performed by: INTERNAL MEDICINE

## 2021-05-22 PROCEDURE — 250N000011 HC RX IP 250 OP 636: Performed by: HOSPITALIST

## 2021-05-22 PROCEDURE — 36415 COLL VENOUS BLD VENIPUNCTURE: CPT | Performed by: INTERNAL MEDICINE

## 2021-05-22 PROCEDURE — 80048 BASIC METABOLIC PNL TOTAL CA: CPT | Performed by: INTERNAL MEDICINE

## 2021-05-22 RX ORDER — ONDANSETRON 4 MG/1
4 TABLET, ORALLY DISINTEGRATING ORAL EVERY 6 HOURS PRN
Status: ON HOLD | DISCHARGE
Start: 2021-05-22 | End: 2021-08-16

## 2021-05-22 RX ORDER — LIDOCAINE 4 G/G
2 PATCH TOPICAL EVERY 24 HOURS
Status: ON HOLD | DISCHARGE
Start: 2021-05-22 | End: 2021-10-09

## 2021-05-22 RX ORDER — HYDROMORPHONE HYDROCHLORIDE 2 MG/1
2 TABLET ORAL EVERY 4 HOURS PRN
Qty: 24 TABLET | Refills: 0 | Status: ON HOLD | OUTPATIENT
Start: 2021-05-22 | End: 2021-08-17

## 2021-05-22 RX ADMIN — DICLOFENAC SODIUM 4 G: 10 GEL TOPICAL at 15:34

## 2021-05-22 RX ADMIN — Medication 250 MG: at 09:12

## 2021-05-22 RX ADMIN — Medication: at 12:34

## 2021-05-22 RX ADMIN — ACETAMINOPHEN 650 MG: 325 TABLET, FILM COATED ORAL at 06:38

## 2021-05-22 RX ADMIN — SERTRALINE HYDROCHLORIDE 100 MG: 100 TABLET ORAL at 09:13

## 2021-05-22 RX ADMIN — ENOXAPARIN SODIUM 40 MG: 40 INJECTION SUBCUTANEOUS at 12:34

## 2021-05-22 RX ADMIN — CLONIDINE HYDROCHLORIDE 0.05 MG: 0.1 TABLET ORAL at 09:12

## 2021-05-22 RX ADMIN — CARVEDILOL 18.75 MG: 12.5 TABLET, FILM COATED ORAL at 09:12

## 2021-05-22 RX ADMIN — Medication: at 09:12

## 2021-05-22 RX ADMIN — DICLOFENAC SODIUM 4 G: 10 GEL TOPICAL at 12:34

## 2021-05-22 RX ADMIN — TAMSULOSIN HYDROCHLORIDE 0.4 MG: 0.4 CAPSULE ORAL at 09:12

## 2021-05-22 RX ADMIN — DICLOFENAC SODIUM 4 G: 10 GEL TOPICAL at 09:12

## 2021-05-22 RX ADMIN — OMEPRAZOLE 20 MG: 20 CAPSULE, DELAYED RELEASE ORAL at 09:13

## 2021-05-22 RX ADMIN — GABAPENTIN 300 MG: 300 CAPSULE ORAL at 09:13

## 2021-05-22 RX ADMIN — Medication: at 15:34

## 2021-05-22 RX ADMIN — ACETAMINOPHEN 650 MG: 325 TABLET, FILM COATED ORAL at 15:33

## 2021-05-22 RX ADMIN — LISINOPRIL 30 MG: 20 TABLET ORAL at 09:12

## 2021-05-22 RX ADMIN — ISOSORBIDE DINITRATE 10 MG: 10 TABLET ORAL at 09:13

## 2021-05-22 RX ADMIN — AMLODIPINE BESYLATE 10 MG: 10 TABLET ORAL at 09:12

## 2021-05-22 RX ADMIN — ISOSORBIDE DINITRATE 10 MG: 10 TABLET ORAL at 15:33

## 2021-05-22 ASSESSMENT — ACTIVITIES OF DAILY LIVING (ADL)
ADLS_ACUITY_SCORE: 18
ADLS_ACUITY_SCORE: 17
ADLS_ACUITY_SCORE: 18
ADLS_ACUITY_SCORE: 18
ADLS_ACUITY_SCORE: 17

## 2021-05-22 NOTE — PROGRESS NOTES
A/O,forgetiful, vss, lung sounds clear, up with assist of one walker and gait/brace. Regular diet, +ve bowel sounds. Passing flatus. Pain controlled  With prn Dilaudid, Volrene gel/Lidocaine patches/Bangay/.Baseline numbness to carlos. LE. Dressing to the back is c/d/i. Almanza patent draining small amounts.Contact Iso. Maintained.Will continue to monitor.

## 2021-05-22 NOTE — PROGRESS NOTES
"Plan for today is TCU at 1630 via [facility]. Alert, oriented x4, mild forgetful. Up in chair with black brace on with 1 assist, walker, GB, and took 2 walks in the hallway today. Moving better today and \"legs are feeling a little better.\" very small stool. Vallecillo cath drains clear, yellow urine. GALLEGOS. Lungs clear, and oxygen 97% on room air. No ankle edema. Pain controlled today with tylenol. Food and fluids fair. Son was at the bedside and notified of plan of care and transfer at 1630. Dressing remains to left back. Bed and chair alarms on for safety. Will discharge with the vallecillo. 1547 patient in agreement of discharge plan to [facility]. She gave verbal consent of the discharge instructions and requested I sign the discharge paper. Pleasant. Oriented. Forgetful mild. Tylenol for pain.   "

## 2021-05-22 NOTE — DISCHARGE SUMMARY
Discharge Summary    Daya Ignacio MRN# [id number]   YOB: 1930 Age: 89 year old     Date of Admission:  5/8/2021  Date of Discharge:  5/22/2021  Admitting Physician:  Roberto Diaz MD  Discharge Physician:  Andre Mcmanus MD  Discharging Service:  Hospitalist       Primary Provider: Johann Hall          Discharge Diagnosis:   1. Aerococcus and ESBL E. coli UTI   2. Urine retention; S/p Almanza catheter placement (left in on discharge)  3. Mid back pain due to numerous vertebral fractures  4. Left flank ruptured blister  5. Possible early partial SBO versus ileus by imaging, resolved  6. Loose stools, resolved  7. Hypertension  8. Depression  9. Chronic adnexal cyst  10. Deconditioning                Discharge Disposition:   Discharged to transitional care           Allergies:   Allergies   Allergen Reactions     Atorvastatin Muscle Pain (Myalgia)     Macrobid [Nitrofurantoin Anhydrous] Other (See Comments)     Body aches     Nitrofurantoin Muscle Pain (Myalgia) and Unknown     Body aches       Penicillins Unknown     Seasonal Allergies Unknown     Sulfa Drugs Other (See Comments)     Tolerated Bactrim May 2019  Entire family has allergy to Sulfa     Sulfasalazine Unknown                Condition on Discharge:   Discharge condition: Stable   Discharge vitals: Blood pressure (!) 153/56, pulse 67, temperature 97  F (36.1  C), temperature source Temporal, resp. rate 16, weight 58.2 kg (128 lb 6.4 oz), SpO2 97 %, not currently breastfeeding.   Code status on discharge: DNR / DNI   Physical exam on day of discharge:   GENERAL:  Comfortable. Cooperative.  PSYCH: pleasant, oriented, No acute distress.  EYES: PERRLA, Normal conjunctiva.  HEART:  Regular rate and rhythm. No JVD. Pulses normal. No edema.  LUNGS:  Clear to auscultation, normal Respiratory effort.  ABDOMEN:  Soft, no hepatosplenomegaly, normal bowel sounds.  EXTREMETIES: No clubbing, cyanosis or ischemia  SKIN:  Dry to touch, No  rash.         History of Present Illness and Hospital Course:     See detailed admission note for full details.  Daya Ignacio is a 89 year old female with history of hypertension, depression, cognitive impairment, nephrolithiasis, lumbar compression fractures, chronic pain, and recurrent drug resistant UTIs.  She presented to the [facility] ED on 5/8/2021 with back pain.  Urinalysis was abnormal and urine culture grew Aerococcus and ESBL E. coli.  She was treated with ertapenem and infectious disease followed.  Course of antibiotic was completed during hospitalization. CT showed no evidence of obstructing ureteral calculi or hydronephrosis.  She had a left flank rash of unclear significance.  It seemed consistent with a large blister that had ruptured.  Topical products were provided and WOC followed.  Pain was intermittent and improved with topical analgesics.  Daya had persistent back pain so MRI of the thoracic and lumbar spine was performed and showed numerous compression fractures (L3 compression fracture, possible acute fracture L4, subacute T12 and L2 fractures, old L1 burst fracture, and old compression fracture of T11).  Spine surgery was consulted and recommended bracing and outpatient follow up.  Daya had urinary hesitancy and frequent voiding of small amount of urine.  She was found to have urinary retention which was treated with intermittent straight catheterization but persisted with post void residual ultrasounds of 380 - 600 ml.  Daya was seen by PT and OT and TCU was recommended on discharge. Discharge was tentatively arranged for 5/21/21 but Daya developed diarrhea and had persistent urine retention. C diff testing was negative and frequent stooling resolved.  Ultimately, Almanza catheter was placed.  This was discussed with Urology and it was thought that retention was most likely due to recent UTI.  It was recommended that Almanza catheter be left in for a week or two and then removed for voiding  trial with Urology follow up as needed.     Problem List:   1. Aerococcus and ESBL E. coli UTI: Appreciate infectious disease consultation.  Completed a course of ertapenem while here.   2. Urine retention. Almanza catheter was placed on 5/21/21 for persistent urinary frequency and frequent voiding of small amounts of urine.  I discussed with Urology and plan is to leave Almanza in place for 1-2 weeks and then remove for voiding trial. Voiding trial can be done at TCU and Caitlinle can follow up with Urology as needed.    3. Mid back pain due to numerous vertebral fractures: MRI showed L3 compression fracture, possible acute fracture L4, subacute T12 and L2 fractures, old L1 burst fracture, and old compression fracture of T11.  CT showed neither obstructing ureteral calculi nor hydronephrosis but there was nonobstructing intrarenal calculi.  Appreciate pain management consultation. Continue use of back brace. Continue lidoderm patches. Continue scheduled Tylenol. Continue prn dilaudid.  4. Left flank rash: Per previous documentation appears to be a blister that has since ruptured.  WOC consult and recommendations for wound care appreciated.  Thought to not be consistent with shingles.   5. Possible early partial SBO versus ileus by imaging without supportive physical findings or symptoms.  Follow up AXR on 5/21/21 showed no signs of obstruction.  6. Frequent loose stools, resolved. C diff testing was negative. Possibly due to resolving partial obstructive process?   7. Hypertension:  Continue pta carvedilol, lisinopril, Imdur, and amlodipine.  8. Depression: Continue pta sertraline.  9. Chronic adnexal cyst: Outpatient evaluation/monitoring.                 Procedures / Imaging:   CT abdomen and pelvis  MRI lumbar spine  MRI thoracic spine  XR of abdomen           Consultations:   Consultation during this admission received from infectious disease, physical therapy, occupational therapy, pain management, and social work              Pending Results:   None           Discharge Instructions and Follow-Up:   Discharge diet: Regular   Discharge activity: Activity as tolerated with assistance and back brace   Discharge follow-up: Follow up with nursing home physician/NP/PA care team in less than one week  Follow up with Neurosurgery with spine Xrays in 6 weeks   Outpatient therapy: PT and OT    Other instructions: Back brace in place when out of  bed             Discharge Medications:   Current Discharge Medication List      START taking these medications    Details   HYDROmorphone (DILAUDID) 2 MG tablet Take 1 tablet (2 mg) by mouth every 4 hours as needed for moderate to severe pain  Qty: 24 tablet, Refills: 0    Associated Diagnoses: Osteoporotic compression fracture of spine, with delayed healing, subsequent encounter      Lidocaine (LIDOCARE) 4 % Patch Place 2 patches onto the skin every 24 hours To prevent lidocaine toxicity, patient should be patch free for 12 hrs daily. Apply over spine.    Associated Diagnoses: Osteoporotic compression fracture of spine, with delayed healing, subsequent encounter      melatonin 1 MG TABS tablet Take 1 tablet (1 mg) by mouth nightly as needed for sleep  Qty:      Associated Diagnoses: Insomnia, unspecified type      menthol, Topical Analgesic, 2.5% (BENGAY VANISHIN SCENT) 2.5 % GEL topical gel Apply topically 4 times daily  Qty:      Associated Diagnoses: Osteoporotic compression fracture of spine, with delayed healing, subsequent encounter      ondansetron (ZOFRAN-ODT) 4 MG ODT tab Take 1 tablet (4 mg) by mouth every 6 hours as needed for nausea or vomiting  Qty:      Associated Diagnoses: Nausea         CONTINUE these medications which have NOT CHANGED    Details   acetaminophen (TYLENOL) 500 MG tablet Take 500 mg by mouth 3 times daily as needed for mild pain      amLODIPine (NORVASC) 10 MG tablet Take 10 mg by mouth daily      Calcium Carbonate (CALCIUM 600 PO) Take 1 tablet by mouth 2 times  daily       !! carvedilol (COREG) 25 MG tablet Take 25 mg by mouth At Bedtime      !! carvedilol (COREG) 25 MG tablet Take 12.5 mg by mouth every morning       Cholecalciferol (VITAMIN D3) 2000 UNITS CAPS Take 2,000 Units by mouth daily (with dinner)      cloNIDine (CATAPRES) 0.1 MG tablet Take 0.05 mg by mouth 2 times daily       diclofenac (VOLTAREN) 1 % topical gel Place 2 g onto the skin 3 times daily as needed for moderate pain Apply to right knee      docusate sodium (COLACE) 100 MG capsule Take 1 capsule (100 mg) by mouth At Bedtime    Associated Diagnoses: Closed fracture of proximal end of right tibia and fibula with routine healing, subsequent encounter; S/P ORIF (open reduction internal fixation) fracture      gabapentin (NEURONTIN) 100 MG capsule Take 2 capsules (200 mg) by mouth 2 times daily  Qty: 120 capsule, Refills: 0    Associated Diagnoses: Other chronic pain      isosorbide dinitrate (ISORDIL) 10 MG tablet Take 1 tablet (10 mg) by mouth 3 times daily  Qty: 270 tablet, Refills: 3    Associated Diagnoses: Non-STEMI (non-ST elevated myocardial infarction) (H)      lisinopril (ZESTRIL) 30 MG tablet Take 30 mg by mouth daily      Multiple Vitamins-Minerals (PRESERVISION AREDS) CAPS Take 1 capsule by mouth 2 times daily       omeprazole (PRILOSEC) 20 MG DR capsule Take 20 mg by mouth daily       polyethylene glycol (MIRALAX/GLYCOLAX) packet Take 17 g by mouth daily as needed for constipation    Associated Diagnoses: Tibia/fibula fracture, right, closed, initial encounter      potassium chloride ER (KLOR-CON M) 10 MEQ CR tablet Take 10 mEq by mouth daily      SENNA-docusate sodium (SENNA S) 8.6-50 MG tablet Take 1 tablet by mouth At Bedtime    Associated Diagnoses: Constipation, unspecified constipation type      sertraline (ZOLOFT) 100 MG tablet Take 100 mg by mouth daily      vitamin C (ASCORBIC ACID) 500 MG tablet Take 500 mg by mouth daily at 4pm        !! - Potential duplicate medications found.  Please discuss with provider.             Total time spent in face to face contact with the patient and coordinating discharge was:  45 Minutes

## 2021-05-22 NOTE — PLAN OF CARE
Care Management Discharge Note    Discharge Date: 05/22/21  Expected Time of Departure: [facility] wheelchair @ 430pm    Discharge Disposition: Transitional Care    Discharge Services:  TCU for rehab    Discharge DME:  none    Discharge Transportation: agency    Private pay costs discussed: transportation costs    PAS Confirmation Code: [id number]  Patient/family educated on Medicare website which has current facility and service quality ratings: yes    Education Provided on the Discharge Plan:  yes  Persons Notified of Discharge Plans: son  Patient/Family in Agreement with the Plan:  yes    Handoff Referral Completed: No    Additional Information:  Patient to discharge today [facility] today. Notified family, facility, unit and orders have been faxed.    SHIN Lao   Inpatient Care Coordination   Supervisor  [facility]  [phone]        ROSE Platt

## 2021-05-24 NOTE — PLAN OF CARE
Physical Therapy Discharge Summary     Reason for therapy discharge:    Discharged to transitional care facility.     Progress towards therapy goal(s). See goals on Care Plan in [facility] electronic health record for goal details.  Goals not met.  Barriers to achieving goals:   needing CGA for mobility skills     Therapy recommendation(s):    Continued therapy is recommended.  Rationale/Recommendations:  Pt is below baseline with functional mobility and strength and would benefit from continued PT to progress skills.

## 2021-05-27 ENCOUNTER — RECORDS - HEALTHEAST (OUTPATIENT)
Dept: LAB | Facility: CLINIC | Age: 86
End: 2021-05-27

## 2021-05-28 LAB
ANION GAP SERPL CALCULATED.3IONS-SCNC: 6 MMOL/L (ref 5–18)
BASOPHILS # BLD AUTO: 0 THOU/UL (ref 0–0.2)
BASOPHILS NFR BLD AUTO: 1 % (ref 0–2)
BUN SERPL-MCNC: 23 MG/DL (ref 8–28)
CALCIUM SERPL-MCNC: 9.1 MG/DL (ref 8.5–10.5)
CHLORIDE BLD-SCNC: 103 MMOL/L (ref 98–107)
CO2 SERPL-SCNC: 28 MMOL/L (ref 22–31)
CREAT SERPL-MCNC: 0.66 MG/DL (ref 0.6–1.1)
EOSINOPHIL # BLD AUTO: 0.3 THOU/UL (ref 0–0.4)
EOSINOPHIL NFR BLD AUTO: 4 % (ref 0–6)
ERYTHROCYTE [DISTWIDTH] IN BLOOD BY AUTOMATED COUNT: 15.2 % (ref 11–14.5)
GFR SERPL CREATININE-BSD FRML MDRD: >60 ML/MIN/1.73M2
GLUCOSE BLD-MCNC: 87 MG/DL (ref 70–125)
HCT VFR BLD AUTO: 27.3 % (ref 35–47)
HGB BLD-MCNC: 9 G/DL (ref 12–16)
IMM GRANULOCYTES # BLD: 0 THOU/UL
IMM GRANULOCYTES NFR BLD: 1 %
LYMPHOCYTES # BLD AUTO: 1.3 THOU/UL (ref 0.8–4.4)
LYMPHOCYTES NFR BLD AUTO: 21 % (ref 20–40)
MCH RBC QN AUTO: 31.5 PG (ref 27–34)
MCHC RBC AUTO-ENTMCNC: 33 G/DL (ref 32–36)
MCV RBC AUTO: 96 FL (ref 80–100)
MONOCYTES # BLD AUTO: 0.6 THOU/UL (ref 0–0.9)
MONOCYTES NFR BLD AUTO: 10 % (ref 2–10)
NEUTROPHILS # BLD AUTO: 4 THOU/UL (ref 2–7.7)
NEUTROPHILS NFR BLD AUTO: 65 % (ref 50–70)
PLATELET # BLD AUTO: 199 THOU/UL (ref 140–440)
PMV BLD AUTO: 10.3 FL (ref 8.5–12.5)
POTASSIUM BLD-SCNC: 4.9 MMOL/L (ref 3.5–5)
RBC # BLD AUTO: 2.86 MILL/UL (ref 3.8–5.4)
SODIUM SERPL-SCNC: 137 MMOL/L (ref 136–145)
WBC: 6.2 THOU/UL (ref 4–11)

## 2021-06-29 ENCOUNTER — OFFICE VISIT (OUTPATIENT)
Dept: NEUROSURGERY | Facility: CLINIC | Age: 86
End: 2021-06-29
Attending: PHYSICIAN ASSISTANT
Payer: COMMERCIAL

## 2021-06-29 ENCOUNTER — ANCILLARY PROCEDURE (OUTPATIENT)
Dept: GENERAL RADIOLOGY | Facility: CLINIC | Age: 86
End: 2021-06-29
Payer: COMMERCIAL

## 2021-06-29 VITALS
SYSTOLIC BLOOD PRESSURE: 135 MMHG | DIASTOLIC BLOOD PRESSURE: 70 MMHG | RESPIRATION RATE: 22 BRPM | BODY MASS INDEX: 23.41 KG/M2 | TEMPERATURE: 98.2 F | OXYGEN SATURATION: 96 % | WEIGHT: 124 LBS | HEIGHT: 61 IN | HEART RATE: 64 BPM

## 2021-06-29 DIAGNOSIS — S32.000S LUMBAR COMPRESSION FRACTURE, SEQUELA: Primary | ICD-10-CM

## 2021-06-29 DIAGNOSIS — S32.030A CLOSED COMPRESSION FRACTURE OF L3 LUMBAR VERTEBRA, INITIAL ENCOUNTER (H): ICD-10-CM

## 2021-06-29 PROCEDURE — 99213 OFFICE O/P EST LOW 20 MIN: CPT | Performed by: PHYSICIAN ASSISTANT

## 2021-06-29 PROCEDURE — G0463 HOSPITAL OUTPT CLINIC VISIT: HCPCS

## 2021-06-29 PROCEDURE — 72100 X-RAY EXAM L-S SPINE 2/3 VWS: CPT | Performed by: RADIOLOGY

## 2021-06-29 ASSESSMENT — PAIN SCALES - GENERAL: PAINLEVEL: SEVERE PAIN (6)

## 2021-06-29 ASSESSMENT — MIFFLIN-ST. JEOR: SCORE: 919.84

## 2021-06-29 NOTE — NURSING NOTE
"Daya Ignacio is a 89 year old female who presents for:  Chief Complaint   Patient presents with     Back Pain        Initial Vitals:  /70 (BP Location: Right arm)   Pulse 64   Temp 98.2  F (36.8  C)   Resp 22   Ht 5' 1\" (1.549 m)   Wt 124 lb (56.2 kg)   SpO2 96%   BMI 23.43 kg/m   Estimated body mass index is 23.43 kg/m  as calculated from the following:    Height as of this encounter: 5' 1\" (1.549 m).    Weight as of this encounter: 124 lb (56.2 kg).. Body surface area is 1.56 meters squared. BP completed using cuff size: regular  Severe Pain (6)          Nursing Comments:FWW, Brace in place. Son present during appointment. Xrays done prior.         Gisela Sellers RN  "

## 2021-06-29 NOTE — PROGRESS NOTES
NEUROSURGERY CLINIC PROGRESS NOTE    DATE OF VISIT: 6/29/2021    HPI:     Daya Ignacio is a pleasant 89 year old female with history of acute on chronic back pain with radiographic evidence of acute L3 compression fracture, subacute T12 and L2 fractures, old L1 burst fracture, old compression fracture T11. Daya is in clinic today following up on acute L3 compression fracture. Since discharge, Daya has been doing well and improving with physical therapy. She admits she will still have intermittent low back pain located right paraspinous muscles of her upper lumbar region. She admits pain worse with movement and better with resting. She denies paresthesias, weakness, radiation of pain, changes in bowel/bladder, changes in gait. Per son, she continues to get stronger with physical therapy and is significantly improved compared to when she was in hospital. No recent falls or trauma. She has been wearing her brace when she is upright and tolerating well. Her facility does skin checks every day to ensure no pressure injuries from brace.     Current Outpatient Medications   Medication     acetaminophen (TYLENOL) 500 MG tablet     amLODIPine (NORVASC) 10 MG tablet     Calcium Carbonate (CALCIUM 600 PO)     carvedilol (COREG) 25 MG tablet     carvedilol (COREG) 25 MG tablet     Cholecalciferol (VITAMIN D3) 2000 UNITS CAPS     cloNIDine (CATAPRES) 0.1 MG tablet     diclofenac (VOLTAREN) 1 % topical gel     docusate sodium (COLACE) 100 MG capsule     gabapentin (NEURONTIN) 100 MG capsule     HYDROmorphone (DILAUDID) 2 MG tablet     isosorbide dinitrate (ISORDIL) 10 MG tablet     Lidocaine (LIDOCARE) 4 % Patch     lisinopril (ZESTRIL) 30 MG tablet     melatonin 1 MG TABS tablet     menthol, Topical Analgesic, 2.5% (BENGAY VANISHIN SCENT) 2.5 % GEL topical gel     Multiple Vitamins-Minerals (PRESERVISION AREDS) CAPS     omeprazole (PRILOSEC) 20 MG DR capsule     ondansetron (ZOFRAN-ODT) 4 MG ODT tab     polyethylene glycol  "(MIRALAX/GLYCOLAX) packet     potassium chloride ER (KLOR-CON M) 10 MEQ CR tablet     SENNA-docusate sodium (SENNA S) 8.6-50 MG tablet     sertraline (ZOLOFT) 100 MG tablet     vitamin C (ASCORBIC ACID) 500 MG tablet     No current facility-administered medications for this visit.        Allergies   Allergen Reactions     Atorvastatin Muscle Pain (Myalgia)     Macrobid [Nitrofurantoin Anhydrous] Other (See Comments)     Body aches     Nitrofurantoin Muscle Pain (Myalgia) and Unknown     Body aches       Penicillins Unknown     Seasonal Allergies Unknown     Sulfa Drugs Other (See Comments)     Tolerated Bactrim May 2019  Entire family has allergy to Sulfa     Sulfasalazine Unknown       Past Medical History:   Diagnosis Date     Anemia      Anxiety      Arthritis      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 4/16/2014     Depression      h/o Clostridium difficile colitis 06/2019     History of blood transfusion      Hypertension      Kidney stone      Lumbar compression fracture (H)      Macular degeneration (senile) of retina, unspecified      Mild cognitive impairment      Mumps      Other chronic pain     low back     Recurrent UTI      Upper GI bleed 06/2019     Urinary tract infection due to extended-spectrum beta lactamase (ESBL)-producing Klebsiella 05/2019    resistant to Bactrim       Review Of Systems   ROS: 10 point ROS neg other than the symptoms noted above in the HPI.    OBJECTIVE:    /70 (BP Location: Right arm)   Pulse 64   Temp 98.2  F (36.8  C)   Resp 22   Ht 5' 1\" (1.549 m)   Wt 124 lb (56.2 kg)   SpO2 96%   BMI 23.43 kg/m      Imaging:    LUMBAR SPINE TWO TO THREE VIEWS June 29, 2021 1:55 PM   HISTORY: Closed compression fracture of L3 lumbar vertebra, initial  encounter (H).  COMPARISON: Two view lumbar spine 7/13/2019.                                                              IMPRESSION: Severe chronic compression fracture deformity of T11,  severe chronic compression fracture " deformity of L1, mild chronic  compression fracture deformity of L2 and moderate chronic compression  fracture deformity of L3 are again noted. There has been further  interval loss of vertebral body height of the L3 compression fracture.  There has been interval development of a moderate compression fracture  deformity of the T12 vertebral body that is age indeterminate. The L4  and L5 vertebral bodies remain normal in height and contour.    Radiographic Findings: Full radiological report in chart. I personally reviewed the images with the patient today.    Exam:    Patient appears comfortable and in no apparent distress. Moving all extremities.  Gait is non-antalgic.  CN II-XII grossly intact, alert and appropriate with conversation and following  commands  Bilateral upper extremities with full strength including hand intrinsics and grasp.  Sensation intact throughout.  Bilateral lower extremities 5/5 strength including plantar and dorsiflexion.  Normal sensation throughout bilaterally.  Negative clonus     ASSESSMENT:    1. Lumbar compression fracture, sequela        PLAN:  Daya Ignacio is a pleasant 89 year old female with history of acute on chronic back pain with radiographic evidence of acute L3 compression fracture, subacute T12 and L2 fractures, old L1 burst fracture, old compression fracture T11. Daya is in clinic today following up on acute L3 compression fracture. Imaging was reviewed today and all questions were answered. XR demonstrated further interval loss of heigh of L3 compression fx. Reviewed with Daya and son this can be expected and we will obtain rpt XR in 6 weeks time.     The patient has remained compliant with light activity only and has been participating in physical therapy and tolerating well.     Daya will return to the clinic in 6 weeks with repeat imaging.    The patient and her son gave verbal understanding and is in agreement with the above plan. She will call or return to the  clinic for any worsening or changes in symptoms.    Respectfully,     Soo ROMERO [facility]  [address] [address]    Tel [phone]  Pager [phone]

## 2021-06-29 NOTE — LETTER
6/29/2021         RE: Daya Ignacio  [address]  [facility] [address]        Dear Colleague,    Thank you for referring your patient, Daya Ignacio, to the [facility] NEUROSURGERY CLINIC [place]. Please see a copy of my visit note below.    NEUROSURGERY CLINIC PROGRESS NOTE    DATE OF VISIT: 6/29/2021    HPI:     Daya Ignacio is a pleasant 89 year old female with history of acute on chronic back pain with radiographic evidence of acute L3 compression fracture, subacute T12 and L2 fractures, old L1 burst fracture, old compression fracture T11. Daya is in clinic today following up on acute L3 compression fracture. Since discharge, Daya has been doing well and improving with physical therapy. She admits she will still have intermittent low back pain located right paraspinous muscles of her upper lumbar region. She admits pain worse with movement and better with resting. She denies paresthesias, weakness, radiation of pain, changes in bowel/bladder, changes in gait. Per son, she continues to get stronger with physical therapy and is significantly improved compared to when she was in hospital. No recent falls or trauma. She has been wearing her brace when she is upright and tolerating well. Her facility does skin checks every day to ensure no pressure injuries from brace.     Current Outpatient Medications   Medication     acetaminophen (TYLENOL) 500 MG tablet     amLODIPine (NORVASC) 10 MG tablet     Calcium Carbonate (CALCIUM 600 PO)     carvedilol (COREG) 25 MG tablet     carvedilol (COREG) 25 MG tablet     Cholecalciferol (VITAMIN D3) 2000 UNITS CAPS     cloNIDine (CATAPRES) 0.1 MG tablet     diclofenac (VOLTAREN) 1 % topical gel     docusate sodium (COLACE) 100 MG capsule     gabapentin (NEURONTIN) 100 MG capsule     HYDROmorphone (DILAUDID) 2 MG tablet     isosorbide dinitrate (ISORDIL) 10 MG tablet     Lidocaine (LIDOCARE) 4 % Patch     lisinopril (ZESTRIL) 30 MG tablet  "    melatonin 1 MG TABS tablet     menthol, Topical Analgesic, 2.5% (BENGAY VANISHIN SCENT) 2.5 % GEL topical gel     Multiple Vitamins-Minerals (PRESERVISION AREDS) CAPS     omeprazole (PRILOSEC) 20 MG DR capsule     ondansetron (ZOFRAN-ODT) 4 MG ODT tab     polyethylene glycol (MIRALAX/GLYCOLAX) packet     potassium chloride ER (KLOR-CON M) 10 MEQ CR tablet     SENNA-docusate sodium (SENNA S) 8.6-50 MG tablet     sertraline (ZOLOFT) 100 MG tablet     vitamin C (ASCORBIC ACID) 500 MG tablet     No current facility-administered medications for this visit.        Allergies   Allergen Reactions     Atorvastatin Muscle Pain (Myalgia)     Macrobid [Nitrofurantoin Anhydrous] Other (See Comments)     Body aches     Nitrofurantoin Muscle Pain (Myalgia) and Unknown     Body aches       Penicillins Unknown     Seasonal Allergies Unknown     Sulfa Drugs Other (See Comments)     Tolerated Bactrim May 2019  Entire family has allergy to Sulfa     Sulfasalazine Unknown       Past Medical History:   Diagnosis Date     Anemia      Anxiety      Arthritis      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 4/16/2014     Depression      h/o Clostridium difficile colitis 06/2019     History of blood transfusion      Hypertension      Kidney stone      Lumbar compression fracture (H)      Macular degeneration (senile) of retina, unspecified      Mild cognitive impairment      Mumps      Other chronic pain     low back     Recurrent UTI      Upper GI bleed 06/2019     Urinary tract infection due to extended-spectrum beta lactamase (ESBL)-producing Klebsiella 05/2019    resistant to Bactrim       Review Of Systems   ROS: 10 point ROS neg other than the symptoms noted above in the HPI.    OBJECTIVE:    /70 (BP Location: Right arm)   Pulse 64   Temp 98.2  F (36.8  C)   Resp 22   Ht 5' 1\" (1.549 m)   Wt 124 lb (56.2 kg)   SpO2 96%   BMI 23.43 kg/m      Imaging:    LUMBAR SPINE TWO TO THREE VIEWS June 29, 2021 1:55 PM   HISTORY: " Closed compression fracture of L3 lumbar vertebra, initial  encounter (H).  COMPARISON: Two view lumbar spine 7/13/2019.                                                              IMPRESSION: Severe chronic compression fracture deformity of T11,  severe chronic compression fracture deformity of L1, mild chronic  compression fracture deformity of L2 and moderate chronic compression  fracture deformity of L3 are again noted. There has been further  interval loss of vertebral body height of the L3 compression fracture.  There has been interval development of a moderate compression fracture  deformity of the T12 vertebral body that is age indeterminate. The L4  and L5 vertebral bodies remain normal in height and contour.    Radiographic Findings: Full radiological report in chart. I personally reviewed the images with the patient today.    Exam:    Patient appears comfortable and in no apparent distress. Moving all extremities.  Gait is non-antalgic.  CN II-XII grossly intact, alert and appropriate with conversation and following  commands  Bilateral upper extremities with full strength including hand intrinsics and grasp.  Sensation intact throughout.  Bilateral lower extremities 5/5 strength including plantar and dorsiflexion.  Normal sensation throughout bilaterally.  Negative clonus     ASSESSMENT:    1. Lumbar compression fracture, sequela        PLAN:  Daya Ignacio is a pleasant 89 year old female with history of acute on chronic back pain with radiographic evidence of acute L3 compression fracture, subacute T12 and L2 fractures, old L1 burst fracture, old compression fracture T11. Daya is in clinic today following up on acute L3 compression fracture. Imaging was reviewed today and all questions were answered. XR demonstrated further interval loss of heigh of L3 compression fx. Reviewed with Daya and son this can be expected and we will obtain rpt XR in 6 weeks time.     The patient has remained compliant  with light activity only and has been participating in physical therapy and tolerating well.     Daya will return to the clinic in 6 weeks with repeat imaging.    The patient and her son gave verbal understanding and is in agreement with the above plan. She will call or return to the clinic for any worsening or changes in symptoms.    Respectfully,     Soo Luo PA-C  [facility] Neurosurgery  [address] [address]    Tel [phone]  Pager [phone]          Again, thank you for allowing me to participate in the care of your patient.        Sincerely,        Familia Camargo PA-C

## 2021-06-30 ENCOUNTER — TELEPHONE (OUTPATIENT)
Facility: CLINIC | Age: 86
End: 2021-06-30

## 2021-08-10 ENCOUNTER — ANCILLARY PROCEDURE (OUTPATIENT)
Dept: GENERAL RADIOLOGY | Facility: CLINIC | Age: 86
End: 2021-08-10
Attending: PHYSICIAN ASSISTANT
Payer: COMMERCIAL

## 2021-08-10 ENCOUNTER — OFFICE VISIT (OUTPATIENT)
Dept: NEUROSURGERY | Facility: CLINIC | Age: 86
End: 2021-08-10
Attending: PHYSICIAN ASSISTANT
Payer: COMMERCIAL

## 2021-08-10 VITALS
TEMPERATURE: 97.3 F | DIASTOLIC BLOOD PRESSURE: 58 MMHG | SYSTOLIC BLOOD PRESSURE: 117 MMHG | OXYGEN SATURATION: 96 % | HEART RATE: 58 BPM

## 2021-08-10 DIAGNOSIS — S32.000S LUMBAR COMPRESSION FRACTURE, SEQUELA: ICD-10-CM

## 2021-08-10 DIAGNOSIS — S32.000S LUMBAR COMPRESSION FRACTURE, SEQUELA: Primary | ICD-10-CM

## 2021-08-10 PROCEDURE — G0463 HOSPITAL OUTPT CLINIC VISIT: HCPCS

## 2021-08-10 PROCEDURE — 72100 X-RAY EXAM L-S SPINE 2/3 VWS: CPT | Performed by: RADIOLOGY

## 2021-08-10 PROCEDURE — 99213 OFFICE O/P EST LOW 20 MIN: CPT | Performed by: PHYSICIAN ASSISTANT

## 2021-08-10 ASSESSMENT — PAIN SCALES - GENERAL: PAINLEVEL: SEVERE PAIN (7)

## 2021-08-10 NOTE — PATIENT INSTRUCTIONS
- Pain management referral for comprehensive services. Possible trigger point injections   - Weaning brace additional 1 hour off per day as tolerated   - Continue exercises as tolerated  - Should pain persist despite pain management and exercises, contact our office for next steps    Soo Luo PA-C  [facility] Neurosurgery  [address] [address]    Tel [phone]

## 2021-08-10 NOTE — PROGRESS NOTES
NEUROSURGERY CLINIC PROGRESS NOTE    DATE OF VISIT: 8/10/2021    HPI:     Daya Ignacio is a pleasant 89 year old female with history of acute on chronic back pain with radiographic evidence of acute L3 compression fracture, subacute T12 and L2 fractures, old L1 burst fracture, old compression fracture T11. Daya is in clinic today for 12 week follow up on acute L3 compression fracture. Since last seen on 06/29/2021, Daya admits pain has not subsided. Pain is constant in her lower back and radiates into her middle back. Pain is sharp with movement. Prolonged sitting, standing makes pain worse. Laying down improves pain. Wearing her brace has helped slightly with pain. She denies paresthesias, weakness, recent falls or gait changes, saddle anesthesia, bowel/bladder changes. She has participate and been discharged from physical therapy and continues to do at home exercises. She will take tylenol PRN for pain and lidocaine patch.     Current Outpatient Medications   Medication     acetaminophen (TYLENOL) 500 MG tablet     amLODIPine (NORVASC) 10 MG tablet     Calcium Carbonate (CALCIUM 600 PO)     carvedilol (COREG) 25 MG tablet     carvedilol (COREG) 25 MG tablet     Cholecalciferol (VITAMIN D3) 2000 UNITS CAPS     cloNIDine (CATAPRES) 0.1 MG tablet     diclofenac (VOLTAREN) 1 % topical gel     docusate sodium (COLACE) 100 MG capsule     gabapentin (NEURONTIN) 100 MG capsule     HYDROmorphone (DILAUDID) 2 MG tablet     isosorbide dinitrate (ISORDIL) 10 MG tablet     Lidocaine (LIDOCARE) 4 % Patch     lisinopril (ZESTRIL) 30 MG tablet     melatonin 1 MG TABS tablet     menthol, Topical Analgesic, 2.5% (BENGAY VANISHIN SCENT) 2.5 % GEL topical gel     Multiple Vitamins-Minerals (PRESERVISION AREDS) CAPS     omeprazole (PRILOSEC) 20 MG DR capsule     ondansetron (ZOFRAN-ODT) 4 MG ODT tab     polyethylene glycol (MIRALAX/GLYCOLAX) packet     potassium chloride ER (KLOR-CON M) 10 MEQ CR tablet     SENNA-docusate sodium  (SENNA S) 8.6-50 MG tablet     sertraline (ZOLOFT) 100 MG tablet     vitamin C (ASCORBIC ACID) 500 MG tablet     No current facility-administered medications for this visit.       Allergies   Allergen Reactions     Atorvastatin Muscle Pain (Myalgia)     Macrobid [Nitrofurantoin Anhydrous] Other (See Comments)     Body aches     Nitrofurantoin Muscle Pain (Myalgia) and Unknown     Body aches       Penicillins Unknown     Seasonal Allergies Unknown     Sulfa Drugs Other (See Comments)     Tolerated Bactrim May 2019  Entire family has allergy to Sulfa     Sulfasalazine Unknown       Past Medical History:   Diagnosis Date     Anemia      Anxiety      Arthritis      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 4/16/2014     Depression      h/o Clostridium difficile colitis 06/2019     History of blood transfusion      Hypertension      Kidney stone      Lumbar compression fracture (H)      Macular degeneration (senile) of retina, unspecified      Mild cognitive impairment      Mumps      Other chronic pain     low back     Recurrent UTI      Upper GI bleed 06/2019     Urinary tract infection due to extended-spectrum beta lactamase (ESBL)-producing Klebsiella 05/2019    resistant to Bactrim       Review Of Systems     ROS: 10 point ROS neg other than the symptoms noted above in the HPI.      OBJECTIVE:    /58 (BP Location: Right arm, Patient Position: Sitting, Cuff Size: Adult Regular)   Pulse 58   Temp 97.3  F (36.3  C)   SpO2 96%     Imaging:    Lumbar XR to be obtained today    Radiographic Findings: Full radiological report in chart. I personally reviewed the images with the patient today.    Exam:    Patient appears comfortable and in no apparent distress. Moving all extremities.  Gait is non-antalgic.  CN II-XII grossly intact, alert and appropriate with conversation and following  commands  Bilateral upper extremities with full strength including hand intrinsics and grasp.  Sensation intact  throughout.  Bilateral lower extremities 5/5 strength including plantar and dorsiflexion.  Normal sensation throughout bilaterally.    +TTP along L1-5 spinous processes and paraspinous muscles     ASSESSMENT:    1. Lumbar compression fracture, sequela        PLAN:    Daya Ignacio is a pleasant 89 year old female with history of acute on chronic back pain with radiographic evidence of acute L3 compression fracture, subacute T12 and L2 fractures, old L1 burst fracture, old compression fracture T11. Daya is in clinic today for 12 week follow up on acute L3 compression fracture. Imaging was reviewed today and all questions were answered.    Daya has remained compliant with her brace. At this time, we will wean her off her brace additional 1 hour per day as tolerated. I will make referral to pain management for comprehensive evaluation and possible trigger point injections. Should pain persist despite physical therapy and pain management, recommend contacting our office in 4-6 weeks to discuss next steps.     A referral was placed to pain management for comprehensive evaluation.     The patient and her son gave verbal understanding and is in agreement with the above plan. She or her son will call or return to the clinic for any worsening or changes in symptoms.    Respectfully,     Soo Luo PA-C  [facility] Neurosurgery  [address]  [address] [place] [id number]    Tel [phone]

## 2021-08-10 NOTE — NURSING NOTE
"Daya Ignacio is a 89 year old female who presents for:  Chief Complaint   Patient presents with     Neurologic Problem     F/U Compression Fx        Initial Vitals:  /58 (BP Location: Right arm, Patient Position: Sitting, Cuff Size: Adult Regular)   Pulse 58   Temp 97.3  F (36.3  C)   SpO2 96%  Estimated body mass index is 23.43 kg/m  as calculated from the following:    Height as of 6/29/21: 5' 1\" (1.549 m).    Weight as of 6/29/21: 124 lb (56.2 kg).. There is no height or weight on file to calculate BSA. BP completed using cuff size: regular  Severe Pain (7)        "

## 2021-08-10 NOTE — LETTER
8/10/2021         RE: Daya Ignacio  [address]  [address]  [facility] [address]        Dear Colleague,    Thank you for referring your patient, Daya Ingacio, to the [facility] NEUROSURGERY CLINIC [place]. Please see a copy of my visit note below.    NEUROSURGERY CLINIC PROGRESS NOTE    DATE OF VISIT: 8/10/2021    HPI:     Daya Ignacio is a pleasant 89 year old female with history of acute on chronic back pain with radiographic evidence of acute L3 compression fracture, subacute T12 and L2 fractures, old L1 burst fracture, old compression fracture T11. Daya is in clinic today for 12 week follow up on acute L3 compression fracture. Since last seen on 06/29/2021, Daya admits pain has not subsided. Pain is constant in her lower back and radiates into her middle back. Pain is sharp with movement. Prolonged sitting, standing makes pain worse. Laying down improves pain. Wearing her brace has helped slightly with pain. She denies paresthesias, weakness, recent falls or gait changes, saddle anesthesia, bowel/bladder changes. She has participate and been discharged from physical therapy and continues to do at home exercises. She will take tylenol PRN for pain and lidocaine patch.     Current Outpatient Medications   Medication     acetaminophen (TYLENOL) 500 MG tablet     amLODIPine (NORVASC) 10 MG tablet     Calcium Carbonate (CALCIUM 600 PO)     carvedilol (COREG) 25 MG tablet     carvedilol (COREG) 25 MG tablet     Cholecalciferol (VITAMIN D3) 2000 UNITS CAPS     cloNIDine (CATAPRES) 0.1 MG tablet     diclofenac (VOLTAREN) 1 % topical gel     docusate sodium (COLACE) 100 MG capsule     gabapentin (NEURONTIN) 100 MG capsule     HYDROmorphone (DILAUDID) 2 MG tablet     isosorbide dinitrate (ISORDIL) 10 MG tablet     Lidocaine (LIDOCARE) 4 % Patch     lisinopril (ZESTRIL) 30 MG tablet     melatonin 1 MG TABS tablet     menthol, Topical Analgesic, 2.5% (BENGAY VANISHIN SCENT) 2.5 % GEL  topical gel     Multiple Vitamins-Minerals (PRESERVISION AREDS) CAPS     omeprazole (PRILOSEC) 20 MG DR capsule     ondansetron (ZOFRAN-ODT) 4 MG ODT tab     polyethylene glycol (MIRALAX/GLYCOLAX) packet     potassium chloride ER (KLOR-CON M) 10 MEQ CR tablet     SENNA-docusate sodium (SENNA S) 8.6-50 MG tablet     sertraline (ZOLOFT) 100 MG tablet     vitamin C (ASCORBIC ACID) 500 MG tablet     No current facility-administered medications for this visit.       Allergies   Allergen Reactions     Atorvastatin Muscle Pain (Myalgia)     Macrobid [Nitrofurantoin Anhydrous] Other (See Comments)     Body aches     Nitrofurantoin Muscle Pain (Myalgia) and Unknown     Body aches       Penicillins Unknown     Seasonal Allergies Unknown     Sulfa Drugs Other (See Comments)     Tolerated Bactrim May 2019  Entire family has allergy to Sulfa     Sulfasalazine Unknown       Past Medical History:   Diagnosis Date     Anemia      Anxiety      Arthritis      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 4/16/2014     Depression      h/o Clostridium difficile colitis 06/2019     History of blood transfusion      Hypertension      Kidney stone      Lumbar compression fracture (H)      Macular degeneration (senile) of retina, unspecified      Mild cognitive impairment      Mumps      Other chronic pain     low back     Recurrent UTI      Upper GI bleed 06/2019     Urinary tract infection due to extended-spectrum beta lactamase (ESBL)-producing Klebsiella 05/2019    resistant to Bactrim       Review Of Systems     ROS: 10 point ROS neg other than the symptoms noted above in the HPI.      OBJECTIVE:    /58 (BP Location: Right arm, Patient Position: Sitting, Cuff Size: Adult Regular)   Pulse 58   Temp 97.3  F (36.3  C)   SpO2 96%     Imaging:    Lumbar XR to be obtained today    Radiographic Findings: Full radiological report in chart. I personally reviewed the images with the patient today.    Exam:    Patient appears comfortable  and in no apparent distress. Moving all extremities.  Gait is non-antalgic.  CN II-XII grossly intact, alert and appropriate with conversation and following  commands  Bilateral upper extremities with full strength including hand intrinsics and grasp.  Sensation intact throughout.  Bilateral lower extremities 5/5 strength including plantar and dorsiflexion.  Normal sensation throughout bilaterally.    +TTP along L1-5 spinous processes and paraspinous muscles     ASSESSMENT:    1. Lumbar compression fracture, sequela        PLAN:    Daya Ignacio is a pleasant 89 year old female with history of acute on chronic back pain with radiographic evidence of acute L3 compression fracture, subacute T12 and L2 fractures, old L1 burst fracture, old compression fracture T11. Daya is in clinic today for 12 week follow up on acute L3 compression fracture. Imaging was reviewed today and all questions were answered.    Daya has remained compliant with her brace. At this time, we will wean her off her brace additional 1 hour per day as tolerated. I will make referral to pain management for comprehensive evaluation and possible trigger point injections. Should pain persist despite physical therapy and pain management, recommend contacting our office in 4-6 weeks to discuss next steps.     A referral was placed to pain management for comprehensive evaluation.     The patient and her son gave verbal understanding and is in agreement with the above plan. She or her son will call or return to the clinic for any worsening or changes in symptoms.    Respectfully,     Soo Luo PA-C  [facility] Neurosurgery  [address] [address]    Tel [phone]          Again, thank you for allowing me to participate in the care of your patient.        Sincerely,        Soo Luo PA-C

## 2021-08-11 ENCOUNTER — TELEPHONE (OUTPATIENT)
Dept: PALLIATIVE MEDICINE | Facility: CLINIC | Age: 86
End: 2021-08-11

## 2021-08-11 NOTE — TELEPHONE ENCOUNTER

## 2021-08-11 NOTE — LETTER
August 11, 2021    Daya Ignacio  [address]    [facility] [address]    Dear Norbert Stapleton to the [facility] Pain Management Center at the [facility]. We are located at [address], [address], [place] [id number]. Your appointment has been scheduled on Tues August 24, 2021 at 9:00a with Saw Wyman MD .    At your first visit, you will meet your team of caregivers who will help you to develop pain management strategies that will last a lifetime. You will meet with our support staff to review your insurance information and collect your co-payment if required by your insurance company. You will meet with a medical pain specialist and care coordinator who will assess your pain and develop a plan of care for your successful pain rehabilitation. You should expect to spend approximately 1 hour at your first visit with us. Usually, patients work with us for a period of 6-12 months, and eventually return to their primary doctor once their pain management has stabilized.      To help us make your visit go as smoothly as possible, please bring the following items with you on your visit:     Completed Pain Questionnaire enclosed in this packet.  If you do not bring the completed questionnaire, we may have to reschedule your appointment.    List of any medicines that you are currently taking or have been prescribed    Pertinent NON-[place] medical information such as medical records or tests results (X-rays, or laboratory tests)    Your health insurance card    Financial resources to cover your co-payment or balance due at the time of service (cash, personal check, Visa, and MasterCard are acceptable methods of payment)     Due to the high demand for new patient evaluations, you must notify the scheduling department 48 hours (2 days) in advance if you are not able to keep this appointment.  Failure to do so could affect your ability to reschedule  with our clinic. Please do not assume that you will receive any prescription medications at your first visit.    Please call [phone] with any questions regarding your appointment. We look forward to meeting you and working to address your health care needs.     Sincerely,    [facility]

## 2021-08-13 ENCOUNTER — LAB REQUISITION (OUTPATIENT)
Dept: LAB | Facility: CLINIC | Age: 86
End: 2021-08-13
Payer: COMMERCIAL

## 2021-08-13 DIAGNOSIS — R32 UNSPECIFIED URINARY INCONTINENCE: ICD-10-CM

## 2021-08-13 LAB
ALBUMIN UR-MCNC: NEGATIVE MG/DL
APPEARANCE UR: ABNORMAL
BACTERIA #/AREA URNS HPF: ABNORMAL /HPF
BILIRUB UR QL STRIP: NEGATIVE
CAOX CRY #/AREA URNS HPF: ABNORMAL /HPF
COLOR UR AUTO: ABNORMAL
GLUCOSE UR STRIP-MCNC: NEGATIVE MG/DL
HGB UR QL STRIP: NEGATIVE
KETONES UR STRIP-MCNC: NEGATIVE MG/DL
LEUKOCYTE ESTERASE UR QL STRIP: ABNORMAL
MUCOUS THREADS #/AREA URNS LPF: PRESENT /LPF
NITRATE UR QL: POSITIVE
PH UR STRIP: 6 [PH] (ref 5–7)
RBC URINE: 2 /HPF
SP GR UR STRIP: 1.02 (ref 1–1.03)
SQUAMOUS EPITHELIAL: 1 /HPF
UROBILINOGEN UR STRIP-MCNC: <2 MG/DL
WBC URINE: 28 /HPF

## 2021-08-13 PROCEDURE — 87086 URINE CULTURE/COLONY COUNT: CPT | Mod: ORL | Performed by: NURSE PRACTITIONER

## 2021-08-13 PROCEDURE — 81001 URINALYSIS AUTO W/SCOPE: CPT | Mod: ORL | Performed by: NURSE PRACTITIONER

## 2021-08-14 ENCOUNTER — APPOINTMENT (OUTPATIENT)
Dept: GENERAL RADIOLOGY | Facility: CLINIC | Age: 86
DRG: 093 | End: 2021-08-14
Attending: PHYSICIAN ASSISTANT
Payer: COMMERCIAL

## 2021-08-14 ENCOUNTER — HOSPITAL ENCOUNTER (INPATIENT)
Facility: CLINIC | Age: 86
LOS: 2 days | Discharge: NURSING, REHAB OR LONG-TERM CARE | DRG: 093 | End: 2021-08-17
Attending: PHYSICIAN ASSISTANT | Admitting: HOSPITALIST
Payer: COMMERCIAL

## 2021-08-14 DIAGNOSIS — M80.88XG OSTEOPOROTIC COMPRESSION FRACTURE OF SPINE, WITH DELAYED HEALING, SUBSEQUENT ENCOUNTER: ICD-10-CM

## 2021-08-14 DIAGNOSIS — K59.00 CONSTIPATION, UNSPECIFIED CONSTIPATION TYPE: ICD-10-CM

## 2021-08-14 DIAGNOSIS — S32.000A LUMBAR COMPRESSION FRACTURE (H): ICD-10-CM

## 2021-08-14 DIAGNOSIS — G89.29 OTHER CHRONIC PAIN: ICD-10-CM

## 2021-08-14 DIAGNOSIS — M54.9 ACUTE BACK PAIN: ICD-10-CM

## 2021-08-14 LAB — SARS-COV-2 RNA RESP QL NAA+PROBE: NEGATIVE

## 2021-08-14 PROCEDURE — 96375 TX/PRO/DX INJ NEW DRUG ADDON: CPT

## 2021-08-14 PROCEDURE — 250N000011 HC RX IP 250 OP 636: Performed by: HOSPITALIST

## 2021-08-14 PROCEDURE — 87635 SARS-COV-2 COVID-19 AMP PRB: CPT | Performed by: PHYSICIAN ASSISTANT

## 2021-08-14 PROCEDURE — 96372 THER/PROPH/DIAG INJ SC/IM: CPT | Performed by: PHYSICIAN ASSISTANT

## 2021-08-14 PROCEDURE — G0378 HOSPITAL OBSERVATION PER HR: HCPCS

## 2021-08-14 PROCEDURE — 250N000013 HC RX MED GY IP 250 OP 250 PS 637: Performed by: HOSPITALIST

## 2021-08-14 PROCEDURE — 250N000013 HC RX MED GY IP 250 OP 250 PS 637: Performed by: PHYSICIAN ASSISTANT

## 2021-08-14 PROCEDURE — 72100 X-RAY EXAM L-S SPINE 2/3 VWS: CPT

## 2021-08-14 PROCEDURE — 99285 EMERGENCY DEPT VISIT HI MDM: CPT | Mod: 25

## 2021-08-14 PROCEDURE — C9803 HOPD COVID-19 SPEC COLLECT: HCPCS

## 2021-08-14 PROCEDURE — 250N000011 HC RX IP 250 OP 636: Performed by: PHYSICIAN ASSISTANT

## 2021-08-14 PROCEDURE — 99220 PR INITIAL OBSERVATION CARE,LEVEL III: CPT | Performed by: HOSPITALIST

## 2021-08-14 PROCEDURE — 96374 THER/PROPH/DIAG INJ IV PUSH: CPT

## 2021-08-14 RX ORDER — NALOXONE HYDROCHLORIDE 0.4 MG/ML
0.2 INJECTION, SOLUTION INTRAMUSCULAR; INTRAVENOUS; SUBCUTANEOUS
Status: DISCONTINUED | OUTPATIENT
Start: 2021-08-14 | End: 2021-08-17 | Stop reason: HOSPADM

## 2021-08-14 RX ORDER — OXYCODONE HYDROCHLORIDE 5 MG/1
5 TABLET ORAL EVERY 4 HOURS PRN
Status: DISCONTINUED | OUTPATIENT
Start: 2021-08-14 | End: 2021-08-15

## 2021-08-14 RX ORDER — POLYETHYLENE GLYCOL 3350 17 G/17G
17 POWDER, FOR SOLUTION ORAL DAILY PRN
Status: DISCONTINUED | OUTPATIENT
Start: 2021-08-14 | End: 2021-08-17 | Stop reason: HOSPADM

## 2021-08-14 RX ORDER — HYDROXYZINE HYDROCHLORIDE 25 MG/1
25 TABLET, FILM COATED ORAL EVERY 6 HOURS PRN
Status: DISCONTINUED | OUTPATIENT
Start: 2021-08-14 | End: 2021-08-16

## 2021-08-14 RX ORDER — CEFTRIAXONE 1 G/1
1 INJECTION, POWDER, FOR SOLUTION INTRAMUSCULAR; INTRAVENOUS EVERY 24 HOURS
Status: DISCONTINUED | OUTPATIENT
Start: 2021-08-14 | End: 2021-08-14

## 2021-08-14 RX ORDER — BISACODYL 10 MG
10 SUPPOSITORY, RECTAL RECTAL DAILY PRN
Status: DISCONTINUED | OUTPATIENT
Start: 2021-08-14 | End: 2021-08-17 | Stop reason: HOSPADM

## 2021-08-14 RX ORDER — AMOXICILLIN 250 MG
2 CAPSULE ORAL 2 TIMES DAILY PRN
Status: DISCONTINUED | OUTPATIENT
Start: 2021-08-14 | End: 2021-08-17 | Stop reason: HOSPADM

## 2021-08-14 RX ORDER — AMOXICILLIN 250 MG
1 CAPSULE ORAL 2 TIMES DAILY PRN
Status: DISCONTINUED | OUTPATIENT
Start: 2021-08-14 | End: 2021-08-17 | Stop reason: HOSPADM

## 2021-08-14 RX ORDER — HYDROMORPHONE HYDROCHLORIDE 1 MG/ML
1 INJECTION, SOLUTION INTRAMUSCULAR; INTRAVENOUS; SUBCUTANEOUS ONCE
Status: COMPLETED | OUTPATIENT
Start: 2021-08-14 | End: 2021-08-14

## 2021-08-14 RX ORDER — HYDROMORPHONE HYDROCHLORIDE 2 MG/1
2 TABLET ORAL 3 TIMES DAILY PRN
Qty: 8 TABLET | Refills: 0 | Status: SHIPPED | OUTPATIENT
Start: 2021-08-14 | End: 2021-08-17

## 2021-08-14 RX ORDER — LIDOCAINE 4 G/G
1 PATCH TOPICAL ONCE
Status: COMPLETED | OUTPATIENT
Start: 2021-08-14 | End: 2021-08-15

## 2021-08-14 RX ORDER — ONDANSETRON 4 MG/1
4 TABLET, ORALLY DISINTEGRATING ORAL EVERY 6 HOURS PRN
Status: DISCONTINUED | OUTPATIENT
Start: 2021-08-14 | End: 2021-08-17 | Stop reason: HOSPADM

## 2021-08-14 RX ORDER — LIDOCAINE 4 G/G
2 PATCH TOPICAL
Status: DISCONTINUED | OUTPATIENT
Start: 2021-08-14 | End: 2021-08-17 | Stop reason: HOSPADM

## 2021-08-14 RX ORDER — NALOXONE HYDROCHLORIDE 0.4 MG/ML
0.4 INJECTION, SOLUTION INTRAMUSCULAR; INTRAVENOUS; SUBCUTANEOUS
Status: DISCONTINUED | OUTPATIENT
Start: 2021-08-14 | End: 2021-08-17 | Stop reason: HOSPADM

## 2021-08-14 RX ORDER — HYDROMORPHONE HYDROCHLORIDE 1 MG/ML
.3-.5 INJECTION, SOLUTION INTRAMUSCULAR; INTRAVENOUS; SUBCUTANEOUS
Status: DISCONTINUED | OUTPATIENT
Start: 2021-08-14 | End: 2021-08-17 | Stop reason: HOSPADM

## 2021-08-14 RX ORDER — ERTAPENEM 1 G/1
1 INJECTION, POWDER, LYOPHILIZED, FOR SOLUTION INTRAMUSCULAR; INTRAVENOUS EVERY 24 HOURS
Status: DISCONTINUED | OUTPATIENT
Start: 2021-08-14 | End: 2021-08-15

## 2021-08-14 RX ORDER — PROCHLORPERAZINE 25 MG
12.5 SUPPOSITORY, RECTAL RECTAL EVERY 12 HOURS PRN
Status: DISCONTINUED | OUTPATIENT
Start: 2021-08-14 | End: 2021-08-17 | Stop reason: HOSPADM

## 2021-08-14 RX ORDER — PROCHLORPERAZINE MALEATE 5 MG
5 TABLET ORAL EVERY 6 HOURS PRN
Status: DISCONTINUED | OUTPATIENT
Start: 2021-08-14 | End: 2021-08-17 | Stop reason: HOSPADM

## 2021-08-14 RX ORDER — ONDANSETRON 2 MG/ML
4 INJECTION INTRAMUSCULAR; INTRAVENOUS EVERY 6 HOURS PRN
Status: DISCONTINUED | OUTPATIENT
Start: 2021-08-14 | End: 2021-08-17 | Stop reason: HOSPADM

## 2021-08-14 RX ORDER — ACETAMINOPHEN 325 MG/1
975 TABLET ORAL EVERY 8 HOURS
Status: DISCONTINUED | OUTPATIENT
Start: 2021-08-14 | End: 2021-08-17 | Stop reason: HOSPADM

## 2021-08-14 RX ORDER — HYDROXYZINE HYDROCHLORIDE 50 MG/1
50 TABLET, FILM COATED ORAL EVERY 6 HOURS PRN
Status: DISCONTINUED | OUTPATIENT
Start: 2021-08-14 | End: 2021-08-16

## 2021-08-14 RX ADMIN — DICLOFENAC SODIUM 4 G: 10 GEL TOPICAL at 20:53

## 2021-08-14 RX ADMIN — OXYCODONE HYDROCHLORIDE 5 MG: 5 TABLET ORAL at 23:57

## 2021-08-14 RX ADMIN — LIDOCAINE 1 PATCH: 246 PATCH TOPICAL at 13:26

## 2021-08-14 RX ADMIN — ACETAMINOPHEN 975 MG: 325 TABLET, FILM COATED ORAL at 21:05

## 2021-08-14 RX ADMIN — OXYCODONE HYDROCHLORIDE 5 MG: 5 TABLET ORAL at 19:51

## 2021-08-14 RX ADMIN — ERTAPENEM SODIUM 1 G: 1 INJECTION, POWDER, LYOPHILIZED, FOR SOLUTION INTRAMUSCULAR; INTRAVENOUS at 20:18

## 2021-08-14 RX ADMIN — HYDROMORPHONE HYDROCHLORIDE 0.5 MG: 1 INJECTION, SOLUTION INTRAMUSCULAR; INTRAVENOUS; SUBCUTANEOUS at 21:05

## 2021-08-14 RX ADMIN — HYDROMORPHONE HYDROCHLORIDE 1 MG: 1 INJECTION, SOLUTION INTRAMUSCULAR; INTRAVENOUS; SUBCUTANEOUS at 13:26

## 2021-08-14 ASSESSMENT — ENCOUNTER SYMPTOMS
WEAKNESS: 0
DYSURIA: 0
BACK PAIN: 1
NUMBNESS: 0
FREQUENCY: 0
ABDOMINAL PAIN: 0
HEMATURIA: 0

## 2021-08-14 ASSESSMENT — MIFFLIN-ST. JEOR: SCORE: 919.84

## 2021-08-14 ASSESSMENT — ACTIVITIES OF DAILY LIVING (ADL)
DEPENDENT_IADLS:: CLEANING;COOKING;LAUNDRY;SHOPPING;MEAL PREPARATION;MEDICATION MANAGEMENT;MONEY MANAGEMENT;TRANSPORTATION

## 2021-08-14 NOTE — ED NOTES
Care Management Discharge Note    Discharge Date:  08/14/2021      Private pay costs discussed: Not applicable      Additional Information:          PRISCILA Maria, LGDOM  , ED Care Management   [phone]

## 2021-08-14 NOTE — ED NOTES
RN spoke to jose Mayo on phone and provided update on admission. Son voiced understanding and would like to be called with any updates.

## 2021-08-14 NOTE — ED PROVIDER NOTES
History     Chief Complaint:  Back Pain    The history is provided by the patient.      Daya Ignacio is a 89 year old female with a history of hypertension, hyperlipidemia, NSTEMI and lumbar compression fractures who presents with back pain. The patient states that today she his having worsening back pain at the sites of her lumbar compression fractures. She denies any recent trauma to area. The patient denies any numbness, weakness, urine or bowel incontinence. She denies any urinary symptoms including dysuria, hematuria, frequency or urgency. She denies any chest pain or abdominal pain. Per patient's record she received 1 mg of dilaudid this morning for pain.     8/10/2021 XR Lumbar spine:   Compression fractures and T11, T12, L1, L2, and L3,  unchanged. Height loss is severe at T11 and L1 and moderate at T12,  L2, and L3. No new fractures are identified. Moderate to severe  degenerative change. Extensive vascular calcifications. Bilateral hip  Hardware.    Review of Systems   Cardiovascular: Negative for chest pain.   Gastrointestinal: Negative for abdominal pain.   Genitourinary: Negative for dysuria, frequency, hematuria and urgency.        No urinary or bowel incontinence   Musculoskeletal: Positive for back pain.   Neurological: Negative for weakness and numbness.   All other systems reviewed and are negative.    Allergies:  Atorvastatin  Macrobid   Nitrofurantoin  Penicillins  Sulfa Drugs  Sulfasalazine    Medications:    Amlodipine   Coreg   Clonidine   Colace   Gabapentin   Dilaudid   Lidocaine patch   Lisinopril   Prilosec   Zoloft     Past Medical History:    Anxiety   Depression   C. Diff   Hypertension   Kidney stones   Lumbar compression fracture  Cognitive impairment   Mumps   Recurrent UTI   Adnexal cyst   AYAKA  GI bleed   NSTEMI  Hypertension   Hyperlipidemia     Past Surgical History:    Cystoscopy X2   ESWL   Hip pinning   Hip arthoplasty   Lithotripsy  ORIF right tibia   Tonsillectomy and  "adenoidectomy     Family History:    Mild alzheimer diease- mother   CAD- father   Parkinson's disease- brother     Social History:  Presents with son   Former smoker     Physical Exam     Patient Vitals for the past 24 hrs:   BP Temp Temp src Pulse Resp SpO2 Height Weight   08/14/21 1505 -- -- -- -- -- 91 % -- --   08/14/21 1500 -- -- -- -- -- 91 % -- --   08/14/21 1455 -- -- -- -- -- 92 % -- --   08/14/21 1450 (!) 171/81 -- -- 66 -- 91 % -- --   08/14/21 1445 (!) 171/81 -- -- -- -- -- -- --   08/14/21 1205 -- -- -- -- -- 96 % -- --   08/14/21 1200 132/71 -- -- 61 -- 93 % -- --   08/14/21 1157 132/71 98.7  F (37.1  C) Oral 61 16 93 % 1.549 m (5' 1\") 56.2 kg (124 lb)     Physical Exam  Vitals and nursing note reviewed.   Constitutional:       General: She is not in acute distress.     Appearance: She is not diaphoretic.   HENT:      Head: Normocephalic and atraumatic.   Eyes:      General: No scleral icterus.  Cardiovascular:      Rate and Rhythm: Normal rate.      Pulses: Normal pulses.   Pulmonary:      Effort: Pulmonary effort is normal. No respiratory distress.      Breath sounds: Normal breath sounds.   Abdominal:      General: Abdomen is flat. There is no distension.      Tenderness: There is no abdominal tenderness. There is no guarding or rebound.   Musculoskeletal:      Lumbar back: Tenderness and bony tenderness present.        Back:    Skin:     General: Skin is warm.      Findings: No rash.   Neurological:      Mental Status: She is alert.       Emergency Department Course     Imaging:    Lumbar spine:   Compression fracture deformities of T11, L1, L2 and L3   again noted without change from the recent comparison study. Mild   degenerative retrolisthesis of L1 upon L2 again noted. Alignment   otherwise normal. No new fractures. Osteopenia of the visualized bones   again noted.   Reading per radiology    Laboratory:    Asymptomatic COVID19 Virus PCR by nasopharyngeal swab pending "     Procedures:    Emergency Department Course:    Reviewed:  I reviewed nursing notes, vitals, past history and care everywhere    Assessments:  1151 I obtained history and examined the patient as noted above.     1309 I rechecked the patient and explained findings.     1408 I returned to check on patient.  The patient reports feeling better.     1505 I returned to check on patient.      Interventions:  1326 Dilaudid 1 mg IM     1326 Lidocaine patch transdermal     Consults:   I spoke with Dr. Hillman of the Hospitalist service from [facility] regarding patient's presentation, findings, and plan of care.    Disposition:  The patient was admitted to the hospital under the care of Dr. Hillman.    Impression & Plan        Medical Decision Making:  Denies symptoms of UTI, kidney stone. Denies abdominal pain and benign examination ruling down intra abdominal catastrophe. No rash or changes to support herpes zoster, soft tissue infection. Denies any symptoms of cauda equina syndrome, spinal epidural hematoma or abscess, sciatica, lumbar radiculopathy. Attempts were made at pain control with her outpatient breakthrough medication of Dilaudid. This temporarily relieved her symptoms. Unfortunately even hours later she has persistent drowsiness and dizziness due to the narcotic administration. Attempts were made 5 hours into the visit for her to even transfer into a bedside chair, ambulate with a walker. She was unable to perform these tasks due to her symptoms. Plan for 23 hour observation.     Covid-19  Daya Ignacio was evaluated during a global COVID-19 pandemic, which necessitated consideration that the patient might be at risk for infection with the SARS-CoV-2 virus that causes COVID-19.   Applicable protocols for evaluation were followed during the patient's care.     Diagnosis:    ICD-10-CM    1. Lumbar compression fracture (H)  S32.000A    2. Acute back pain  M54.9        Discharge Medications:  New Prescriptions     HYDROMORPHONE (DILAUDID) 2 MG TABLET    Take 1 tablet (2 mg) by mouth 3 times daily as needed for breakthrough pain       Scribe Disclosure:  I, Nuvia Magdaleno, am serving as a scribe at 11:55 AM on 8/14/2021 to document services personally performed by Darvin Evangelista PA-C based on my observations and the provider's statements to me.      Darvin Evangelista PA-C  08/14/21 1811

## 2021-08-14 NOTE — ED NOTES
Put the back brace on to try to ambulate. Pt shuffled two small steps. Pt complained of weakness and pain. Pt could not ambulate w/o assistance.

## 2021-08-14 NOTE — CONSULTS
See ED Note for Care management SW consult.     PRISCILA Maria, [facility]  , ED Care Management   [phone]

## 2021-08-14 NOTE — ED TRIAGE NOTES
Patient here by EMS from her home at [facility], assisted living, with complaint of back pain. She has known compression fractures, chronic back pain, and is wearing a back brace.

## 2021-08-14 NOTE — ED NOTES
[facility]  ED Nurse Handoff Report    Daya Ignacio is a 89 year old female   ED Chief complaint: Back Pain  . ED Diagnosis:   Final diagnoses:   Lumbar compression fracture (H)   Acute back pain     Allergies:   Allergies   Allergen Reactions     Atorvastatin Muscle Pain (Myalgia)     Macrobid [Nitrofurantoin Anhydrous] Other (See Comments)     Body aches     Nitrofurantoin Muscle Pain (Myalgia) and Unknown     Body aches       Penicillins Unknown     Seasonal Allergies Unknown     Sulfa Drugs Other (See Comments)     Tolerated Bactrim May 2019  Entire family has allergy to Sulfa     Sulfasalazine Unknown       Code Status: DNR / DNI  Activity level - Baseline/Home:  Stand by Assist. Activity Level - Current:   Assist X 2. Lift room needed: No. Bariatric: No   Needed: No   Isolation: No. Infection: Not Applicable.     Vital Signs:   Vitals:    08/14/21 1455 08/14/21 1500 08/14/21 1505 08/14/21 1725   BP:    (!) 185/77   Pulse:    63   Resp:       Temp:       TempSrc:       SpO2: 92% 91% 91% 94%   Weight:       Height:           Cardiac Rhythm:  ,      Pain level:    Patient confused: No. Patient Falls Risk: Yes.   Elimination Status: Has voided   Patient Report - Initial Complaint: Back pain. Focused Assessment:  Daya Ignacio is a 89 year old female with a history of hypertension, hyperlipidemia, NSTEMI and lumbar compression fractures who presents with back pain. The patient states that today she his having worsening back pain at the sites of her lumbar compression fractures. She denies any recent trauma to area. The patient denies any numbness, weakness, urine or bowel incontinence. She denies any urinary symptoms including dysuria, hematuria, frequency or urgency. She denies any chest pain or abdominal pain. Per patient's record she received 1 mg of dilaudid this morning for pain.  Tests Performed: xray. Abnormal Results: Abnormal Labs Reviewed - No abnormal labs to display   Lumbar  spine XR, 2-3 views   Final Result   IMPRESSION: Compression fracture deformities of T11, L1, L2 and L3   again noted without change from the recent comparison study. Mild   degenerative retrolisthesis of L1 upon L2 again noted. Alignment   otherwise normal. No new fractures. Osteopenia of the visualized bones   again noted.       ZENY JERRY MD            SYSTEM ID:  [id number]      .   Treatments provided: lidocaine patch, dilaudid 1 mg IM  Family Comments: Pt here by herself currently, son present earlier  OBS brochure/video discussed/provided to patient:  yes  ED Medications:   Medications   Lidocaine (LIDOCARE) 4 % Patch 1 patch (1 patch Transdermal Patch/Med Applied 8/14/21 1326)   HYDROmorphone (PF) (DILAUDID) injection 1 mg (1 mg Intramuscular Given 8/14/21 1326)     Drips infusing:  No  For the majority of the shift, the patient's behavior Green. Interventions performed were n/a.    Sepsis treatment initiated: No     Patient tested for COVID 19 prior to admission: YES    ED Nurse Name/Phone Number: Maisha Olsen RN,   5:36 PM    RECEIVING UNIT ED HANDOFF REVIEW    Above ED Nurse Handoff Report was reviewed: YES  Reviewed by: Suni Eason RN on August 14, 2021 at 6:51 PM

## 2021-08-14 NOTE — ED NOTES
Care Management Initial Consult    General Information  Assessment completed with: Patient, Children, Son Gael  Type of CM/SW Visit: Initial Assessment    Primary Care Provider verified and updated as needed:     Readmission within the last 30 days: no previous admission in last 30 days      Reason for Consult: discharge planning  Advance Care Planning: Advance Care Planning Reviewed: questions answered, concerns discussed          Communication Assessment  Patient's communication style: spoken language (English or Bilingual)             Cognitive  Cognitive/Neuro/Behavioral:                        Living Environment:   People in home: alone, facility resident     Current living Arrangements: assisted living  [facility]    Able to return to prior arrangements: yes       Family/Social Support:  Care provided by: self, other (see comments) (Facility Staff)  Provides care for: no one, unable/limited ability to care for self     Children          Description of Support System: Supportive, Involved    Support Assessment: Adequate family and caregiver support    Current Resources:   Patient receiving home care services: No     Community Resources: None  Equipment currently used at home: grab bar, toilet, grab bar, tub/shower, walker, emil, other (see comments) (Back brace)  Supplies currently used at home: Other (Back brace)    Employment/Financial:  Employment Status:          Financial Concerns: No concerns identified   Referral to Financial Counselor: No  Finance Comments: Bin Mayo- JACLYN    Lifestyle & Psychosocial Needs:  Social Determinants of Health     Tobacco Use: Medium Risk     Smoking Tobacco Use: Former Smoker     Smokeless Tobacco Use: Never Used   Alcohol Use:      Frequency of Alcohol Consumption:      Average Number of Drinks:      Frequency of Binge Drinking:    Financial Resource Strain:      Difficulty of Paying Living Expenses:    Food Insecurity:      Worried About Running  Out of Food in the Last Year:      Ran Out of Food in the Last Year:    Transportation Needs:      Lack of Transportation (Medical):      Lack of Transportation (Non-Medical):    Physical Activity:      Days of Exercise per Week:      Minutes of Exercise per Session:    Stress:      Feeling of Stress :    Social Connections:      Frequency of Communication with Friends and Family:      Frequency of Social Gatherings with Friends and Family:      Attends Rastafarian Services:      Active Member of Clubs or Organizations:      Attends Club or Organization Meetings:      Marital Status:    Intimate Partner Violence:      Fear of Current or Ex-Partner:      Emotionally Abused:      Physically Abused:      Sexually Abused:    Depression:      PHQ-2 Score:    Housing Stability:      Unable to Pay for Housing in the Last Year:      Number of Places Lived in the Last Year:      Unstable Housing in the Last Year:        Functional Status:  Prior to admission patient needed assistance:   Dependent ADLs:: Ambulation-walker, Bathing, Positioning, Transfers, Toileting  Dependent IADLs:: Cleaning, Cooking, Laundry, Shopping, Meal Preparation, Medication Management, Money Management, Transportation       Mental Health Status:          Chemical Dependency Status:                Values/Beliefs:  Spiritual, Cultural Beliefs, Rastafarian Practices, Values that affect care:                 Additional Information:  Patient presents with son Gael.     Patient lives at [facility] [phone].     Patient lives in the Assisted Living portion. Patient gets medication administration, bathing services, meals, and cleaning services. Patient can get dressed on her own. Patient uses a walker. Patient's son Gael provides transportation and is the POA.     Patient's son wants to ask the doctor about the pain medication and their recommendation for transportation before making a decision on who will transport.     ACP  is not on file. Patient's son believes she has one at [facility]. All questions and concerns were answered.       PRISCILA Maria, [facility]  , ED Care Management   [phone]

## 2021-08-14 NOTE — ED NOTES
Asked pt to ambulate with me, but pt refused. Pt stated that side and back hurt too much to walk. Pt stated that she wanted to stay in bed until tomorrow.

## 2021-08-15 ENCOUNTER — APPOINTMENT (OUTPATIENT)
Dept: PHYSICAL THERAPY | Facility: CLINIC | Age: 86
DRG: 093 | End: 2021-08-15
Attending: HOSPITALIST
Payer: COMMERCIAL

## 2021-08-15 LAB — BACTERIA UR CULT: ABNORMAL

## 2021-08-15 PROCEDURE — 97161 PT EVAL LOW COMPLEX 20 MIN: CPT | Mod: GP

## 2021-08-15 PROCEDURE — 120N000004 HC R&B MS OVERFLOW

## 2021-08-15 PROCEDURE — 99233 SBSQ HOSP IP/OBS HIGH 50: CPT | Performed by: PHYSICIAN ASSISTANT

## 2021-08-15 PROCEDURE — 96376 TX/PRO/DX INJ SAME DRUG ADON: CPT

## 2021-08-15 PROCEDURE — 97530 THERAPEUTIC ACTIVITIES: CPT | Mod: GP

## 2021-08-15 PROCEDURE — 250N000013 HC RX MED GY IP 250 OP 250 PS 637: Performed by: HOSPITALIST

## 2021-08-15 PROCEDURE — 99207 PR CDG-CODE CATEGORY CHANGED: CPT | Performed by: PHYSICIAN ASSISTANT

## 2021-08-15 PROCEDURE — 250N000011 HC RX IP 250 OP 636: Performed by: PHYSICIAN ASSISTANT

## 2021-08-15 PROCEDURE — 96372 THER/PROPH/DIAG INJ SC/IM: CPT | Performed by: PHYSICIAN ASSISTANT

## 2021-08-15 PROCEDURE — G0378 HOSPITAL OBSERVATION PER HR: HCPCS

## 2021-08-15 PROCEDURE — 250N000011 HC RX IP 250 OP 636: Performed by: HOSPITALIST

## 2021-08-15 PROCEDURE — 250N000013 HC RX MED GY IP 250 OP 250 PS 637: Performed by: PHYSICIAN ASSISTANT

## 2021-08-15 RX ORDER — GABAPENTIN 300 MG/1
300 CAPSULE ORAL DAILY
Status: DISCONTINUED | OUTPATIENT
Start: 2021-08-15 | End: 2021-08-17 | Stop reason: HOSPADM

## 2021-08-15 RX ORDER — GABAPENTIN 100 MG/1
100 CAPSULE ORAL DAILY
Status: DISCONTINUED | OUTPATIENT
Start: 2021-08-15 | End: 2021-08-15

## 2021-08-15 RX ORDER — ACETAMINOPHEN 500 MG
1000 TABLET ORAL DAILY PRN
Status: ON HOLD | COMMUNITY
End: 2021-08-17

## 2021-08-15 RX ORDER — GABAPENTIN 100 MG/1
200 CAPSULE ORAL DAILY
Status: DISCONTINUED | OUTPATIENT
Start: 2021-08-15 | End: 2021-08-15

## 2021-08-15 RX ORDER — HYDROMORPHONE HYDROCHLORIDE 2 MG/1
2 TABLET ORAL EVERY 4 HOURS PRN
Status: DISCONTINUED | OUTPATIENT
Start: 2021-08-15 | End: 2021-08-17 | Stop reason: HOSPADM

## 2021-08-15 RX ORDER — VANCOMYCIN HYDROCHLORIDE 125 MG/1
125 CAPSULE ORAL DAILY
Status: DISCONTINUED | OUTPATIENT
Start: 2021-08-15 | End: 2021-08-15

## 2021-08-15 RX ORDER — GABAPENTIN 300 MG/1
300 CAPSULE ORAL AT BEDTIME
Status: DISCONTINUED | OUTPATIENT
Start: 2021-08-15 | End: 2021-08-17 | Stop reason: HOSPADM

## 2021-08-15 RX ORDER — TRAMADOL HYDROCHLORIDE 50 MG/1
50 TABLET ORAL EVERY 6 HOURS PRN
Status: DISCONTINUED | OUTPATIENT
Start: 2021-08-15 | End: 2021-08-15

## 2021-08-15 RX ADMIN — GABAPENTIN 300 MG: 300 CAPSULE ORAL at 09:10

## 2021-08-15 RX ADMIN — ACETAMINOPHEN 975 MG: 325 TABLET, FILM COATED ORAL at 12:53

## 2021-08-15 RX ADMIN — DICLOFENAC SODIUM 4 G: 10 GEL TOPICAL at 20:45

## 2021-08-15 RX ADMIN — HYDROMORPHONE HYDROCHLORIDE 2 MG: 2 TABLET ORAL at 16:35

## 2021-08-15 RX ADMIN — DICLOFENAC SODIUM 4 G: 10 GEL TOPICAL at 12:54

## 2021-08-15 RX ADMIN — HYDROMORPHONE HYDROCHLORIDE 2 MG: 2 TABLET ORAL at 12:53

## 2021-08-15 RX ADMIN — HYDROMORPHONE HYDROCHLORIDE 2 MG: 2 TABLET ORAL at 22:03

## 2021-08-15 RX ADMIN — HYDROMORPHONE HYDROCHLORIDE 0.5 MG: 1 INJECTION, SOLUTION INTRAMUSCULAR; INTRAVENOUS; SUBCUTANEOUS at 04:57

## 2021-08-15 RX ADMIN — HYDROXYZINE HYDROCHLORIDE 50 MG: 50 TABLET, FILM COATED ORAL at 13:10

## 2021-08-15 RX ADMIN — ACETAMINOPHEN 975 MG: 325 TABLET, FILM COATED ORAL at 04:40

## 2021-08-15 RX ADMIN — VANCOMYCIN HYDROCHLORIDE 125 MG: 125 CAPSULE ORAL at 09:11

## 2021-08-15 RX ADMIN — HYDROMORPHONE HYDROCHLORIDE 2 MG: 2 TABLET ORAL at 09:10

## 2021-08-15 RX ADMIN — DICLOFENAC SODIUM 4 G: 10 GEL TOPICAL at 16:36

## 2021-08-15 RX ADMIN — ACETAMINOPHEN 975 MG: 325 TABLET, FILM COATED ORAL at 20:44

## 2021-08-15 RX ADMIN — GABAPENTIN 300 MG: 300 CAPSULE ORAL at 22:02

## 2021-08-15 RX ADMIN — OXYCODONE HYDROCHLORIDE 5 MG: 5 TABLET ORAL at 04:40

## 2021-08-15 RX ADMIN — DICLOFENAC SODIUM 4 G: 10 GEL TOPICAL at 09:11

## 2021-08-15 ASSESSMENT — MIFFLIN-ST. JEOR: SCORE: 938.44

## 2021-08-15 NOTE — H&P
[facility]  Hospitalist H&P    Name: Daya Ignacio      MRN: [id number]  YOB: 1930    Age: 89 year old  Date of admission: 8/14/2021  Primary care provider: Johann Hall            Assessment and Plan:     Daya Ignacio is a 89 year old female with a history of anxiety, depression, C. difficile, hypertension, compression fractures, cognitive impairment, hyperlipidemia who presents with back pain.    She suffered an acute L3 compression fracture about 12 weeks ago.  She also had imaging evidence of a subacute T12 and L2 fracture, old L1 burst fracture, and old compression fracture of T11.  She has had essentially constant pain since that time.  This radiates to the right side slightly.  It is sharp and worsens with movement.  Prolonged sitting and standing makes the pain worse.  Laying does improve the pain to some degree.  She was provided a brace and has been using this which has provided some assistance.  She denies any paresthesias, weakness, recent falls, saddle anesthesia, or bowel or bladder incontinence.  She previously participated in physical therapy and was discharged to do home exercises.  She has been using a variety of analgesics at home without much relief.  Plan was to have her follow-up in the pain management clinic but pain worsened so she comes to the emergency department for evaluation.    In the ED her temperature is 98.7, heart rate 61, blood pressure 132/71, respirate 16 and oxygen saturation 93% room air.  Basic x-ray shows compression fracture deformities of T11, L1, L2, and L3 again noted without change from recent comparison.  She received 1 mg of IM Dilaudid and became extremely somnolent and was unable/unwilling to ambulate so is being admitted to observation for pain management and PT.    1. Uncontrolled back pain related to acute L3 compression fracture: Please see the history above for details.  I do think further detailed imaging is necessary  but could be considered.  Spine consultation could also be considered but will hold off for now.  Will request physical therapy evaluation.  Numerous analgesics have been ordered including diclofenac topical gel, scheduled acetaminophen, Lidoderm patch, icy hot patch, as needed opiates and as needed hydroxyzine.  If she remains hospitalized on Monday a pain management consultation could be considered.  2. Hypertension: Blood pressure well controlled.  Continue prior to admission amlodipine, lisinopril, clonidine, and carvedilol.  3. Depression and anxiety: Continue prior to admission sertraline.  4. GERD: Resume Prilosec.  5. UTI: Urinalysis from yesterday abnormal with urine culture growing gram-negative rods.  She has history of ESBL organisms we will start ertapenem and follow-up cultures.  Also evidently has a history of C. difficile so would limit to a short course of antibiotics.    Code status: DNR/DNI.  Admit to observation status.  Prophylaxis: PCD's.  Disposition: Likely will require TCU.            Chief Complaint:     Back pain.         History of Present Illness:   Daya Ignacio is a 89 year old female who presents with back pain.  History was obtained from my discussion with the patient at the bedside.  I also discussed the case with the ED provider.  The electronic medical record was also reviewed.    She suffered an acute L3 compression fracture about 12 weeks ago.  She also had imaging evidence of a subacute T12 and L2 fracture, old L1 burst fracture, and old compression fracture of T11.  She has had essentially constant pain since that time.  This radiates to the right side slightly.  It is sharp and worsens with movement.  Prolonged sitting and standing makes the pain worse.  Laying does improve the pain to some degree.  She was provided a brace and has been using this which has provided some assistance.  She denies any paresthesias, weakness, recent falls, saddle anesthesia, or bowel or bladder  incontinence.  She previously participated in physical therapy and was discharged to do home exercises.  She has been using a variety of analgesics at home without much relief.  Plan was to have her follow-up in the pain management clinic but pain worsened so she comes to the emergency department for evaluation.    In the ED her temperature is 98.7, heart rate 61, blood pressure 132/71, respirate 16 and oxygen saturation 93% room air.  Basic x-ray shows compression fracture deformities of T11, L1, L2, and L3 again noted without change from recent comparison.  She received 1 mg of IM Dilaudid and became extremely somnolent and was unable/unwilling to ambulate so is being admitted to observation for pain management and PT.            Past Medical History:     Past Medical History:   Diagnosis Date     Anemia      Anxiety      Arthritis      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 4/16/2014     Depression      h/o Clostridium difficile colitis 06/2019     History of blood transfusion      Hypertension      Kidney stone      Lumbar compression fracture (H)      Macular degeneration (senile) of retina, unspecified      Mild cognitive impairment      Mumps      Other chronic pain     low back     Recurrent UTI      Upper GI bleed 06/2019     Urinary tract infection due to extended-spectrum beta lactamase (ESBL)-producing Klebsiella 05/2019    resistant to Bactrim             Past Surgical History:     Past Surgical History:   Procedure Laterality Date     Cataract removal NOS Bilateral      COMBINED CYSTOSCOPY, INSERT STENT URETER(S) Left 5/6/2020    Procedure: Cystoscopy with left ureteral stent insertion;  Surgeon: Gaurav Munson MD;  Location:  OR     CYSTOSCOPY N/A 8/1/2019    Procedure: Exam under anesthesia, video cystopanendoscopy;  Surgeon: Dennis Carlson MD;  Location:  OR     ESOPHAGOSCOPY, GASTROSCOPY, DUODENOSCOPY (EGD), COMBINED N/A 6/11/2019    Procedure: ESOPHAGOGASTRODUODENOSCOPY (EGD);   Surgeon: Gaurav Dergoot MD;  Location:  GI     EXTRACORPOREAL SHOCK WAVE LITHOTRIPSY (ESWL) Left 5/28/2020    Procedure: LEFT EXTRACORPOREAL SHOCK WAVE LITHOTRIPSY;  Surgeon: Gaurav Munson MD;  Location:  OR     Hip Pinning procedure Left      Hip replacement NOS Right      LASER HOLMIUM LITHOTRIPSY URETER(S), INSERT STENT, COMBINED Left 10/30/2020    Procedure: Cystoscopy, left ureteral stent exchange, left retrograde pyelogram, interpretation of fluoroscopic images, left ureteroscopy with holmium lithotripsy and stone basketing;  Surgeon: Gaurav Munson MD;  Location: [facility] OR     OPEN REDUCTION INTERNAL FIXATION TIBIAL PLATEAU Right 8/28/2019    Procedure: Open reduction internal fixation right proximal tibia fracture;  Surgeon: Molina Pardo MD;  Location:  OR     TONSILLECTOMY, ADENOIDECTOMY, COMBINED               Social History:     Social History     Tobacco Use     Smoking status: Former Smoker     Types: Cigarettes     Smokeless tobacco: Never Used   Substance Use Topics     Alcohol use: No             Family History:   The family history was fully reviewed and non-contributory in this case.         Allergies:     Allergies   Allergen Reactions     Atorvastatin Muscle Pain (Myalgia)     Macrobid [Nitrofurantoin Anhydrous] Other (See Comments)     Body aches     Nitrofurantoin Muscle Pain (Myalgia) and Unknown     Body aches       Penicillins Unknown     Seasonal Allergies Unknown     Sulfa Drugs Other (See Comments)     Tolerated Bactrim May 2019  Entire family has allergy to Sulfa     Sulfasalazine Unknown             Medications:     Prior to Admission medications    Medication Sig Last Dose Taking? Auth Provider   HYDROmorphone (DILAUDID) 2 MG tablet Take 1 tablet (2 mg) by mouth 3 times daily as needed for breakthrough pain  Yes Darvin Evangelista PA-C   acetaminophen (TYLENOL) 500 MG tablet Take 500 mg by mouth 3 times daily as needed for mild pain    Unknown, Entered By History   amLODIPine (NORVASC) 10 MG tablet Take 10 mg by mouth daily   Unknown, Entered By History   Calcium Carbonate (CALCIUM 600 PO) Take 1 tablet by mouth 2 times daily    Reported, Patient   carvedilol (COREG) 25 MG tablet Take 25 mg by mouth At Bedtime   Unknown, Entered By History   carvedilol (COREG) 25 MG tablet Take 12.5 mg by mouth every morning    Reported, Patient   Cholecalciferol (VITAMIN D3) 2000 UNITS CAPS Take 2,000 Units by mouth daily (with dinner)   Unknown, Entered By History   cloNIDine (CATAPRES) 0.1 MG tablet Take 0.05 mg by mouth 2 times daily    Unknown, Entered By History   diclofenac (VOLTAREN) 1 % topical gel Place 2 g onto the skin 3 times daily as needed for moderate pain Apply to right knee   Unknown, Entered By History   docusate sodium (COLACE) 100 MG capsule Take 1 capsule (100 mg) by mouth At Bedtime   Seema Quintero, APRN CNP   gabapentin (NEURONTIN) 100 MG capsule Take 2 capsules (200 mg) by mouth 2 times daily   Amanda Kenyon MD   HYDROmorphone (DILAUDID) 2 MG tablet Take 1 tablet (2 mg) by mouth every 4 hours as needed for moderate to severe pain   Andre Mcmanus MD   isosorbide dinitrate (ISORDIL) 10 MG tablet Take 1 tablet (10 mg) by mouth 3 times daily   Marcy Soares PA-C   Lidocaine (LIDOCARE) 4 % Patch Place 2 patches onto the skin every 24 hours To prevent lidocaine toxicity, patient should be patch free for 12 hrs daily. Apply over spine.   Andre Mcmanus MD   lisinopril (ZESTRIL) 30 MG tablet Take 30 mg by mouth daily   Unknown, Entered By History   melatonin 1 MG TABS tablet Take 1 tablet (1 mg) by mouth nightly as needed for sleep   Andre Mcmanus MD   menthol, Topical Analgesic, 2.5% (BENGAY VANISHIN SCENT) 2.5 % GEL topical gel Apply topically 4 times daily   Andre Mcmanus MD   Multiple Vitamins-Minerals (PRESERVISION AREDS) CAPS Take 1 capsule by mouth 2 times daily    Unknown, Entered By History   omeprazole  "(PRILOSEC) 20 MG DR capsule Take 20 mg by mouth daily    Reported, Patient   ondansetron (ZOFRAN-ODT) 4 MG ODT tab Take 1 tablet (4 mg) by mouth every 6 hours as needed for nausea or vomiting   Andre Mcmanus MD   polyethylene glycol (MIRALAX/GLYCOLAX) packet Take 17 g by mouth daily as needed for constipation   Gisela Mehta PA-C   potassium chloride ER (KLOR-CON M) 10 MEQ CR tablet Take 10 mEq by mouth daily   Unknown, Entered By History   SENNA-docusate sodium (SENNA S) 8.6-50 MG tablet Take 1 tablet by mouth At Bedtime   IngridMichaelKARTHIK Edmonds CNP   sertraline (ZOLOFT) 100 MG tablet Take 100 mg by mouth daily   Unknown, Entered By History   vitamin C (ASCORBIC ACID) 500 MG tablet Take 500 mg by mouth daily at 4pm    Reported, Patient             Review of Systems:     A Comprehensive greater than 10 system review of systems was carried out.  Pertinent positives and negatives are noted above.  Otherwise negative for contributory information.           Physical Exam:   Blood pressure (!) 166/103, pulse 68, temperature 98.7  F (37.1  C), temperature source Oral, resp. rate 16, height 1.549 m (5' 1\"), weight 56.2 kg (124 lb), SpO2 93 %, not currently breastfeeding.  Wt Readings from Last 1 Encounters:   08/14/21 56.2 kg (124 lb)     Exam:  GENERAL: No apparent distress. Awake, alert, and fully oriented.  HEENT: Normocephalic, atraumatic. Extraocular movements intact.  CARDIOVASCULAR: Regular rate and rhythm without murmurs or rubs. No S3.  PULMONARY: Clear to auscultation bilaterally.  ABDOMINAL: Soft, non-tender, non-distended. Bowel sounds normoactive.   EXTREMITIES: No cyanosis or clubbing. No appreciable edema.  NEUROLOGICAL: CN 2-12 grossly intact, no focal neurological deficits.  DERMATOLOGICAL: No rash, ulcer, bruising, nor jaundice.          Data:   Laboratory:  None.  No results for input(s): CULT in the last 168 hours.    Imaging:  Recent Results (from the past 24 hour(s))   Lumbar spine XR, " 2-3 views    Narrative    LUMBAR SPINE TWO TO THREE VIEWS 8/14/2021 12:32 PM     COMPARISON: Thoracolumbar spine x-ray series 8/10/2021.    HISTORY: Acute on chronic pain.      Impression    IMPRESSION: Compression fracture deformities of T11, L1, L2 and L3  again noted without change from the recent comparison study. Mild  degenerative retrolisthesis of L1 upon L2 again noted. Alignment  otherwise normal. No new fractures. Osteopenia of the visualized bones  again noted.     ZENY JERRY MD         SYSTEM ID:  [id number]       German Hillman DO MPH  [facility] Hospitalist  [address] [facility] [place] [address]  08/14/2021

## 2021-08-15 NOTE — UTILIZATION REVIEW
[facility] Utilization Review  Admission Status; Secondary Review Determination     Admission Date: 8/14/2021 11:48 AM      Under the authority of the Utilization Management Committee, the utilization review process indicated a secondary review on the above patient.  The review outcome is based on review of the medical records, discussions with staff, and applying clinical experience noted on the date of the review.        (X)      Inpatient Status Appropriate - This patient's medical care is consistent with medical management for inpatient care and reasonable inpatient medical practice.          RATIONALE FOR DETERMINATION   Daya Ignacio is a 89 year old female with past medical history of multilevel compression fractures, UTIs, anxiety and depression, who presented to the emergency room with complaints of intractable back pain.  She is registered to observation for management of intractable back pain related to prior compression fractures of T11, T12, L2, L3 with burst fracture of L1.  She is started on aggressive pain control regimen with opioids and nonopioid analgesics, PT, immobilization with brace and supportive cares.  However, her pain is unabated despite pain regimen and she is unable to ambulate despite above-mentioned measures.  Cares are rendered complex due to advanced age, prior compression fractures and ongoing symptoms despite aggressive pain management.  She remains high risk for adverse outcome.  Her pain remains uncontrolled and still requiring frequent IV and oral opioids and plan is to escalate therapy with likely pain consult evaluation in a.m.  In light of complexity of care, failed observation cares and ongoing symptoms with plan for escalate pain control regimen with anticipated length of stay more than 2 midnights, it is reasonable to advance to inpatient status.  Recommendation is communicated with the primary team (Katherine Evans PA-C).        The severity of illness,  intensity of service provided, expected length of stay and risk for adverse outcome make the care complex, high risk and appropriate for hospital admission.The patient requires hospital based medical care which is anticipated to require a stay of 2 or more midnights;  therefore the patient is appropriately admitted to the hospital as inpatient.         The information on this document is developed by the utilization review team in order for the business office to ensure compliance.  This only denotes the appropriateness of proper admission status and does not reflect the quality of care rendered.              Sincerely,       Jan Wright MD, MS  Physician Advisor  Utilization Review-[place]    Phone: [phone]

## 2021-08-15 NOTE — PLAN OF CARE
PRIMARY DIAGNOSIS: COMPRESSION FX   OUTPATIENT/OBSERVATION GOALS TO BE MET BEFORE DISCHARGE:  1. Pain Status: Improved but still requiring IV narcotics.    2. Return to near baseline physical activity: No    3. Cleared for discharge by consultants (if involved): No, PT Consulted    Discharge Planner Nurse   Safe discharge environment identified: Yes  Barriers to discharge: Yes, Unless medically cleared       Entered by: Saman Poole 08/15/2021 08:00PM  Pt admitted to Obs floor, IV Abx administered, P.O. Oxy and IV Dilaudid administered for 8/10 back pain. Brief changed and purwik placed. Will continue supportive cares.  Please review provider order for any additional goals.   Nurse to notify provider when observation goals have been met and patient is ready for discharge.

## 2021-08-15 NOTE — PLAN OF CARE
[facility]      OUTPATIENT PHYSICAL THERAPY EVALUATION  PLAN OF TREATMENT FOR OUTPATIENT REHABILITATION  (COMPLETE FOR INITIAL CLAIMS ONLY)  Patient's Last Name, First Name, M.I.  YOB: 1930  Daya Ignacio                           Provider's Name  [facility] Medical Record No.  [id number]                               Onset Date:  08/14/21   Start of Care Date:  08/15/21      Type:     _X_PT   ___OT   ___SLP Medical Diagnosis:                           PT Diagnosis:  impaired functional mobility   Visits from SOC:  1   _________________________________________________________________________________  Plan of Treatment/Functional Goals    Planned Interventions: bed mobility training, transfer training, progressive activity/exercise, postural re-education     Goals: See Physical Therapy Goals on Care Plan in [facility] electronic health record.    Therapy Frequency: One time eval and treatment only (while on OBS status)  Predicted Duration of Therapy Intervention: 1 session  _________________________________________________________________________________    I CERTIFY THE NEED FOR THESE SERVICES FURNISHED UNDER        THIS PLAN OF TREATMENT AND WHILE UNDER MY CARE     (Physician co-signature of this document indicates review and certification of the therapy plan).                Certification date from: 08/15/21, Certification date to: 08/15/21    Referring Physician: German Hillman,             Initial Assessment        See Physical Therapy evaluation dated 08/15/21 in Epic electronic health record.

## 2021-08-15 NOTE — PROGRESS NOTES
PRN IV Dilaudid 0.5mg administered for 9/10 back pain, uncontrolled with oral analgesics, following repositioning. Will continue to monitor.

## 2021-08-15 NOTE — PROVIDER NOTIFICATION
"After repositioning patient, patient reporting increased back pain. Tearful, moaning. Reports we are \"torturing her\". Pt reassured we are here to care for her. Multimodal pain regimen explained to patient and family. Son agrees with plan of care. Oral dilaudid, atarax, tylenol and voltaren gel given to patient. Patient wishes to get more so she \"can just sleep and take away the pain\". Nurse explained the risks of too many pain medications at that same time.     Provider notified of constant reports of back pain until sleeping. No new orders at this time. Will continue to monitor and provide supportive cares.   "

## 2021-08-15 NOTE — PROGRESS NOTES
08/15/21 0928   Quick Adds   Type of Visit Initial PT Evaluation   Living Environment   People in home alone;facility resident   Current Living Arrangements assisted living   Home Accessibility no concerns   Living Environment Comments Patient indicates getting assist with dressing tasks, cleaning, cooking, bathing.   Self-Care   Usual Activity Tolerance moderate   Current Activity Tolerance poor   Equipment Currently Used at Home grab bar, toilet;grab bar, tub/shower;walker, emil;other (see comments)   Activity/Exercise/Self-Care Comment At baseline patient using a walker   Disability/Function   Fall history within last six months no   Change in Functional Status Since Onset of Current Illness/Injury yes   General Information   Onset of Illness/Injury or Date of Surgery 08/14/21   Referring Physician German Hillman, DO   Patient/Family Therapy Goals Statement (PT) To feel better   Pertinent History of Current Problem (include personal factors and/or comorbidities that impact the POC) 89 year old female with a history of anxiety, depression, C. difficile, hypertension, compression fractures, cognitive impairment, hyperlipidemia who presents with back pain.She suffered an acute L3 compression fracture about 12 weeks ago.  She also had imaging evidence of a subacute T12 and L2 fracture, old L1 burst fracture, and old compression fracture of T11.  She has had essentially constant pain since that time.   Existing Precautions/Restrictions fall;spinal   Weight-Bearing Status - LLE weight-bearing as tolerated   Weight-Bearing Status - RLE weight-bearing as tolerated   General Observations Greeted patient supine in bed.   Cognition   Orientation Status (Cognition) oriented to;person;place   Affect/Mental Status (Cognition) WFL   Follows Commands (Cognition) follows one-step commands;50-74% accuracy;delayed response/completion;increased processing time needed   Pain Assessment   Patient Currently in Pain   (10/10 mid  back)   Integumentary/Edema   Integumentary/Edema no deficits were identifed   Range of Motion (ROM)   ROM Comment B LE WFL   Strength   Strength Comments B LE functionally weak; limited by back pain   Bed Mobility   Comment (Bed Mobility) rolls at [place]; unable to perform supine <> EOB due to high pain levels   Transfers   Transfer Safety Comments refuses due to pain    Clinical Impression   Criteria for Skilled Therapeutic Intervention yes, treatment indicated   PT Diagnosis (PT) impaired functional mobility   Influenced by the following impairments pain, LE weakness, decreased activity tolerance   Functional limitations due to impairments bed mobility, transfers, ambulation   Clinical Presentation Evolving/Changing   Clinical Presentation Rationale PMH, current presentation, clinical judgement   Clinical Decision Making (Complexity) low complexity   Therapy Frequency (PT) One time eval and treatment only  (while on OBS status)   Predicted Duration of Therapy Intervention (days/wks) 1 session   Planned Therapy Interventions (PT) bed mobility training;transfer training;progressive activity/exercise;postural re-education   Risk & Benefits of therapy have been explained evaluation/treatment results reviewed;care plan/treatment goals reviewed;patient   PT Discharge Planning    PT Discharge Recommendation (DC Rec) Transitional Care Facility   PT Rationale for DC Rec PT - Patient is below baseline for functional mobility & would benefit from continued physical therapy in the setting of a TCU for improving functional mobility, LE strength and tolerance for functional activities in order to return to baseline of functioning.    PT Brief overview of current status  Patient refusing OOB activity due to severe back pain during PT session. Per discussion with RN, patient mobilizing OOB at Ax2 with FWW & GB.   Total Evaluation Time   Total Evaluation Time (Minutes) 8

## 2021-08-15 NOTE — PROGRESS NOTES
Pt/family was provided with the Medicare Compare list for SNF.  Discussed associated Medicare star ratings to assist with choice for referrals/discharge planning Yes    Education was given to pt/family that star ratings are updated/maintained by Medicare and can be reviewed by visiting www.medicare.gov Yes    Referrals sent to [facility], [facility] and [facility].

## 2021-08-15 NOTE — PROGRESS NOTES
Care Coordination Follow - Up Assessment        PCP: Johann Hall    Referral Source:  Medical Staff      Health Maintenance Reviewed: Yes      Patient/Caregiver Understanding     Patient in pain and provided some information but primarily from her son Gael who was present in the room         Current Behavioral Health Concerns:   None noted        Resources Given:    TCU resource provided.  Son states his mother has been to [facility] (shared room) several times and he would prefer she go there if possible.  Otherwise another facility if necessary.    Transportation TBD  Plan:   Referrals sent to TCU and follow up with son Gael Becerra, [phone]

## 2021-08-15 NOTE — PLAN OF CARE
"PRIMARY DIAGNOSIS: COMPRESSION FX  OUTPATIENT/OBSERVATION GOALS TO BE MET BEFORE DISCHARGE:  1. Pain Status: Improved-controlled with oral pain medications.    2. Return to near baseline physical activity: No    3. Cleared for discharge by consultants (if involved): No    Discharge Planner Nurse   Safe discharge environment identified: Yes  Barriers to discharge: Yes, Unless medically cleared       Entered by: Saman Poole 08/15/2021 4:22 AM  BP (!) 152/63 (BP Location: Left arm)   Pulse 65   Temp 98.2  F (36.8  C) (Oral)   Resp 16   Ht 1.549 m (5' 1\")   Wt 56.2 kg (124 lb)   SpO2 94%   BMI 23.43 kg/m    Hypertensive but otherwise stable VS, Pt is AxOx4 and able to make needs known. Calm and cooperative with cares, can only turn to left side momentarily then stays to right side. Brace off while sleeping due to increased pain with further repositioning, will re-attempt placement in AM. No further pain management needed since 2357, resting peacefully. Plan to provide Abx for UTI and consult with PT regarding pain and mobility tomorrow. Possible spine or pain consults if unresolved with PT. Will continue to provide supportive cares.  Please review provider order for any additional goals.   Nurse to notify provider when observation goals have been met and patient is ready for discharge.  "

## 2021-08-15 NOTE — PROGRESS NOTES
[facility]  Hospitalist Progress Note  Katherine Evans PA-C 08/15/2021    Reason for Stay (Diagnosis): Intractable back pain         Assessment and Plan:      Summary of Stay: Daya Ignacio is a 89 year old female with known multilevel compression fractures, ESBL UTIs, C diff, HTN, cognitive impairment, HTN, HLP and anxiety/depression  admitted on 8/14/2021 with complaints of worsening intractable back pain. She has known compression fractures at T11, T12, L2 and L3 with burst fracture of L1 and uses a brace intermittently. On admission she was vitally stable, lab work not performed but urine obtained on 8/13 due to hx of frequent infections is positive for nitrites and 28 WBC with greater than 100,000 colonies of Klebsiella pneumonia.    #Intractable back pain  #Known compression fractures at T11, T12, L2 and L3 with burst fracture of L1  In May 2021 imaging revealed an acute L3 compression fracture. She was braced and placed on a multimodal pain regimen (pain service assisted) with TCU discharge. She continues to wear the brace but pain is uncontrolled with Tylenol and Lidoderm patches. She followed up with neurosurgery on 8/10 who recommended a pain service referral and weaning of the brace. If pain persisted beyond 4-6 weeks they would consider other measures (but patient tells me she would not want surgery). Repeat x ray of lumbar spine on 8/14 did not show new evidence of fracture. She is intermittently moaning and miserable.  -Previous pain service records reviewed from 5/21  -Start Dilaudid 2 mg every 4 hrs, Lidoderm patches, voltaren gel, gabapentin 300 mg BID  -PT assessment  -Continue wear brace  -Consider pain service consult tomorrow if pain still not controlled    #UTI vs colonization  Urinalysis from 8/13 abnormal with urine culture growing >100,000 colonies of Klebsiella pneumonia.  She has history of ESBL organisms but denies lower urinary tract symptoms.   -Started ertapenem  "on admission but will discontinue today given lack of symptoms  -Watch for signs of infection or urinary symptoms    #HTN  -Continue PTA Amlodipine, Lisinopril, Clonidine and Carvedilol    #Depression/anxiety  -Continue PTA Sertraline    #GERD  -Continue PTA Prilosec      DVT Prophylaxis: Lovenox  Code Status: DNR / DNI  Discharge Dispo: Anticipate will need rehab          Interval History (Subjective):      Reports no improvement in back pain this AM. Pain is worse with movement and legs also feel weak and in pain. She denies abdominal pain and urinary symptoms.                   Physical Exam:      Last Vital Signs:  BP (!) 165/53 (BP Location: Left arm)   Pulse 69   Temp 97.7  F (36.5  C) (Oral)   Resp 20   Ht 1.549 m (5' 1\")   Wt 58.1 kg (128 lb 1.6 oz)   SpO2 93%   BMI 24.20 kg/m        Intake/Output Summary (Last 24 hours) at 8/15/2021 1030  Last data filed at 8/15/2021 0612  Gross per 24 hour   Intake --   Output 250 ml   Net -250 ml       Constitutional: Awake, alert, cooperative, intermittently whimpering   Respiratory: Clear to auscultation bilaterally, no crackles or wheezing   Cardiovascular: Regular rate and rhythm, normal S1 and S2, and no murmur noted   Abdomen: Normal bowel sounds, soft, non-distended, non-tender   Skin: No rashes, no cyanosis, dry to touch   Neuro: Alert and oriented x3, no weakness, numbness, memory loss   Extremities: No edema, normal range of motion   Other(s):        All other systems: Negative          Medications:      All current medications were reviewed with changes reflected in problem list.         Data:      All new lab and imaging data was reviewed.   Labs:  No results for input(s): NA, POTASSIUM, CHLORIDE, CO2, ANIONGAP, GLC, BUN, CR, GFRESTIMATED, GFRESTBLACK, DAVE in the last 168 hours.  No results for input(s): WBC, HGB, HCT, MCV, PLT in the last 168 hours.   Imaging:   Recent Results (from the past 24 hour(s))   Lumbar spine XR, 2-3 views    Narrative    " LUMBAR SPINE TWO TO THREE VIEWS 8/14/2021 12:32 PM     COMPARISON: Thoracolumbar spine x-ray series 8/10/2021.    HISTORY: Acute on chronic pain.      Impression    IMPRESSION: Compression fracture deformities of T11, L1, L2 and L3  again noted without change from the recent comparison study. Mild  degenerative retrolisthesis of L1 upon L2 again noted. Alignment  otherwise normal. No new fractures. Osteopenia of the visualized bones  again noted.     ZENY JERRY MD         SYSTEM ID:  [id number]

## 2021-08-15 NOTE — PROGRESS NOTES
ROOM # [id number]    Living Situation (if not independent, order SW consult): assisted living  [facility] name: [facility]  : [facility]    Activity level at baseline: indep with walker  Activity level on admit: Lift assist due to pain       Patient registered to observation; given Patient Bill of Rights; given the opportunity to ask questions about observation status and their plan of care.  Patient has been oriented to the observation room, bathroom and call light is in place.    Discussed discharge goals and expectations with patient/family.

## 2021-08-15 NOTE — PLAN OF CARE
"PRIMARY DIAGNOSIS: COMPRESSION FX  OUTPATIENT/OBSERVATION GOALS TO BE MET BEFORE DISCHARGE:  1. Pain Status: Improved-controlled with oral pain medications.     2. Return to near baseline physical activity: No     3. Cleared for discharge by consultants (if involved): No     Discharge Planner Nurse   Safe discharge environment identified: Yes  Barriers to discharge: Yes, Unless medically cleared       Entered by: saravanan moe RN    BP (!) 165/53 (BP Location: Left arm)   Pulse 69   Temp 97.7  F (36.5  C) (Oral)   Resp 20   Ht 1.549 m (5' 1\")   Wt 58.1 kg (128 lb 1.6 oz)   SpO2 93%   BMI 24.20 kg/m      Contact isolation maintained. Vitals stable, except BP elevated. Pt alert and oriented x4. Ax1 with walker and gait belt. Up to chair this am for a short amount of time, patient was uncomfortable so she was assisted back to bed. Back brace up when OOB. Antibiotics discontinued, awaiting urine culture. Purewick in place due to limited movement. Reports 10/10 pain when awake, oral dilaudid given. Sleeping in between cares. PT and SW following for safe discharge plan. Pt resting comfortably. Will continue to monitor and provide supportive cares.     Please review provider order for any additional goals.   Nurse to notify provider when observation goals have been met and patient is ready for discharge.        "

## 2021-08-15 NOTE — PROVIDER NOTIFICATION
7:50 PM  Provider notified: Dr. German Hillman  Reason: Pt's UC positive for gram neg bacilli. Orders to start antibiotics?   Order: Ertapenem ordered.

## 2021-08-15 NOTE — PLAN OF CARE
Physical Therapy Discharge Summary    Reason for therapy discharge:    No further expectations of functional progress. Patient admitted under OBS status with anticipated short length of stay. Discharge recommendations provided.     Progress towards therapy goal(s). See goals on Care Plan in [facility] electronic health record for goal details.  Goals partially met.  Barriers to achieving goals:   limited tolerance for therapy and discharge from facility.    Therapy recommendation(s):    Patient is below baseline for functional mobility and most limited by severe back pain & would benefit from continued physical therapy in the setting of a TCU for improving functional mobility, LE strength and tolerance for functional activities in order to return to baseline of functioning.

## 2021-08-15 NOTE — PLAN OF CARE
"PRIMARY DIAGNOSIS: COMPRESSION FX  OUTPATIENT/OBSERVATION GOALS TO BE MET BEFORE DISCHARGE:  1. Pain Status: Improved-controlled with oral pain medications.    2. Return to near baseline physical activity: No    3. Cleared for discharge by consultants (if involved): No    Discharge Planner Nurse   Safe discharge environment identified: Yes  Barriers to discharge: Yes, unless medically cleared       Entered by: Saman Poole 08/15/2021 12:30 AM  /59 (BP Location: Left arm)   Pulse 63   Temp 98.1  F (36.7  C) (Oral)   Resp 16   Ht 1.549 m (5' 1\")   Wt 56.2 kg (124 lb)   SpO2 95%   BMI 23.43 kg/m    VSS, PRN Oxy administered for 8/10 back pain. Pt currently resting peacefully. Will continue to provide supportive cares.  Please review provider order for any additional goals.   Nurse to notify provider when observation goals have been met and patient is ready for discharge.  "

## 2021-08-16 ENCOUNTER — APPOINTMENT (OUTPATIENT)
Dept: MRI IMAGING | Facility: CLINIC | Age: 86
DRG: 093 | End: 2021-08-16
Attending: PHYSICIAN ASSISTANT
Payer: COMMERCIAL

## 2021-08-16 LAB
ANION GAP SERPL CALCULATED.3IONS-SCNC: 4 MMOL/L (ref 3–14)
BASOPHILS # BLD AUTO: 0 10E3/UL (ref 0–0.2)
BASOPHILS NFR BLD AUTO: 0 %
BUN SERPL-MCNC: 27 MG/DL (ref 7–30)
CALCIUM SERPL-MCNC: 9.8 MG/DL (ref 8.5–10.1)
CHLORIDE BLD-SCNC: 101 MMOL/L (ref 94–109)
CO2 SERPL-SCNC: 28 MMOL/L (ref 20–32)
CREAT SERPL-MCNC: 0.68 MG/DL (ref 0.52–1.04)
EOSINOPHIL # BLD AUTO: 0.3 10E3/UL (ref 0–0.7)
EOSINOPHIL NFR BLD AUTO: 3 %
ERYTHROCYTE [DISTWIDTH] IN BLOOD BY AUTOMATED COUNT: 13.2 % (ref 10–15)
GFR SERPL CREATININE-BSD FRML MDRD: 77 ML/MIN/1.73M2
GLUCOSE BLD-MCNC: 126 MG/DL (ref 70–99)
HCT VFR BLD AUTO: 38.3 % (ref 35–47)
HGB BLD-MCNC: 12.8 G/DL (ref 11.7–15.7)
IMM GRANULOCYTES # BLD: 0.1 10E3/UL
IMM GRANULOCYTES NFR BLD: 1 %
LYMPHOCYTES # BLD AUTO: 1.6 10E3/UL (ref 0.8–5.3)
LYMPHOCYTES NFR BLD AUTO: 16 %
MCH RBC QN AUTO: 31.6 PG (ref 26.5–33)
MCHC RBC AUTO-ENTMCNC: 33.4 G/DL (ref 31.5–36.5)
MCV RBC AUTO: 95 FL (ref 78–100)
MONOCYTES # BLD AUTO: 0.8 10E3/UL (ref 0–1.3)
MONOCYTES NFR BLD AUTO: 9 %
NEUTROPHILS # BLD AUTO: 6.8 10E3/UL (ref 1.6–8.3)
NEUTROPHILS NFR BLD AUTO: 71 %
NRBC # BLD AUTO: 0 10E3/UL
NRBC BLD AUTO-RTO: 0 /100
PLATELET # BLD AUTO: 215 10E3/UL (ref 150–450)
PLATELET # BLD AUTO: 227 10E3/UL (ref 150–450)
POTASSIUM BLD-SCNC: 3.8 MMOL/L (ref 3.4–5.3)
RBC # BLD AUTO: 4.05 10E6/UL (ref 3.8–5.2)
SODIUM SERPL-SCNC: 133 MMOL/L (ref 133–144)
WBC # BLD AUTO: 9.5 10E3/UL (ref 4–11)

## 2021-08-16 PROCEDURE — 99207 PR CONSULT E&M CHANGED TO INITIAL LEVEL: CPT | Performed by: NURSE PRACTITIONER

## 2021-08-16 PROCEDURE — 99222 1ST HOSP IP/OBS MODERATE 55: CPT | Performed by: NURSE PRACTITIONER

## 2021-08-16 PROCEDURE — 250N000013 HC RX MED GY IP 250 OP 250 PS 637: Performed by: PHYSICIAN ASSISTANT

## 2021-08-16 PROCEDURE — 250N000013 HC RX MED GY IP 250 OP 250 PS 637: Performed by: HOSPITALIST

## 2021-08-16 PROCEDURE — 99207 PR CDG-CODE CATEGORY CHANGED: CPT | Performed by: PHYSICIAN ASSISTANT

## 2021-08-16 PROCEDURE — 36415 COLL VENOUS BLD VENIPUNCTURE: CPT | Performed by: HOSPITALIST

## 2021-08-16 PROCEDURE — 72148 MRI LUMBAR SPINE W/O DYE: CPT

## 2021-08-16 PROCEDURE — 85049 AUTOMATED PLATELET COUNT: CPT | Performed by: PHYSICIAN ASSISTANT

## 2021-08-16 PROCEDURE — 85049 AUTOMATED PLATELET COUNT: CPT | Performed by: HOSPITALIST

## 2021-08-16 PROCEDURE — 80048 BASIC METABOLIC PNL TOTAL CA: CPT | Performed by: PHYSICIAN ASSISTANT

## 2021-08-16 PROCEDURE — 36415 COLL VENOUS BLD VENIPUNCTURE: CPT | Performed by: PHYSICIAN ASSISTANT

## 2021-08-16 PROCEDURE — 258N000003 HC RX IP 258 OP 636: Performed by: PHYSICIAN ASSISTANT

## 2021-08-16 PROCEDURE — 99232 SBSQ HOSP IP/OBS MODERATE 35: CPT | Performed by: PHYSICIAN ASSISTANT

## 2021-08-16 PROCEDURE — 120N000004 HC R&B MS OVERFLOW

## 2021-08-16 RX ORDER — CARVEDILOL 12.5 MG/1
12.5 TABLET ORAL EVERY MORNING
Status: DISCONTINUED | OUTPATIENT
Start: 2021-08-16 | End: 2021-08-17 | Stop reason: HOSPADM

## 2021-08-16 RX ORDER — HYDROXYZINE HYDROCHLORIDE 50 MG/1
50 TABLET, FILM COATED ORAL 3 TIMES DAILY
Status: DISCONTINUED | OUTPATIENT
Start: 2021-08-16 | End: 2021-08-17 | Stop reason: HOSPADM

## 2021-08-16 RX ORDER — CLONIDINE HYDROCHLORIDE 0.1 MG/1
0.05 TABLET ORAL 2 TIMES DAILY
Status: DISCONTINUED | OUTPATIENT
Start: 2021-08-16 | End: 2021-08-17 | Stop reason: HOSPADM

## 2021-08-16 RX ORDER — ISOSORBIDE DINITRATE 10 MG/1
10 TABLET ORAL 3 TIMES DAILY
Status: DISCONTINUED | OUTPATIENT
Start: 2021-08-16 | End: 2021-08-17 | Stop reason: HOSPADM

## 2021-08-16 RX ORDER — HYDROXYZINE HYDROCHLORIDE 25 MG/1
25 TABLET, FILM COATED ORAL 3 TIMES DAILY
Status: DISCONTINUED | OUTPATIENT
Start: 2021-08-16 | End: 2021-08-17 | Stop reason: HOSPADM

## 2021-08-16 RX ORDER — METHOCARBAMOL 500 MG/1
500 TABLET, FILM COATED ORAL 4 TIMES DAILY PRN
Status: DISCONTINUED | OUTPATIENT
Start: 2021-08-16 | End: 2021-08-17 | Stop reason: HOSPADM

## 2021-08-16 RX ORDER — SERTRALINE HYDROCHLORIDE 100 MG/1
100 TABLET, FILM COATED ORAL DAILY
Status: DISCONTINUED | OUTPATIENT
Start: 2021-08-16 | End: 2021-08-17 | Stop reason: HOSPADM

## 2021-08-16 RX ORDER — CARVEDILOL 25 MG/1
25 TABLET ORAL AT BEDTIME
Status: DISCONTINUED | OUTPATIENT
Start: 2021-08-16 | End: 2021-08-17 | Stop reason: HOSPADM

## 2021-08-16 RX ORDER — GABAPENTIN 100 MG/1
200 CAPSULE ORAL 2 TIMES DAILY
Status: DISCONTINUED | OUTPATIENT
Start: 2021-08-16 | End: 2021-08-16

## 2021-08-16 RX ORDER — AMLODIPINE BESYLATE 10 MG/1
10 TABLET ORAL DAILY
Status: DISCONTINUED | OUTPATIENT
Start: 2021-08-16 | End: 2021-08-17 | Stop reason: HOSPADM

## 2021-08-16 RX ADMIN — LISINOPRIL 30 MG: 20 TABLET ORAL at 13:43

## 2021-08-16 RX ADMIN — DICLOFENAC SODIUM 4 G: 10 GEL TOPICAL at 19:03

## 2021-08-16 RX ADMIN — CARVEDILOL 25 MG: 25 TABLET, FILM COATED ORAL at 22:58

## 2021-08-16 RX ADMIN — ACETAMINOPHEN 975 MG: 325 TABLET, FILM COATED ORAL at 13:43

## 2021-08-16 RX ADMIN — HYDROMORPHONE HYDROCHLORIDE 2 MG: 2 TABLET ORAL at 09:56

## 2021-08-16 RX ADMIN — GABAPENTIN 300 MG: 300 CAPSULE ORAL at 22:58

## 2021-08-16 RX ADMIN — ACETAMINOPHEN 975 MG: 325 TABLET, FILM COATED ORAL at 06:18

## 2021-08-16 RX ADMIN — ACETAMINOPHEN 975 MG: 325 TABLET, FILM COATED ORAL at 20:45

## 2021-08-16 RX ADMIN — HYDROXYZINE HYDROCHLORIDE 25 MG: 25 TABLET, FILM COATED ORAL at 20:53

## 2021-08-16 RX ADMIN — CARVEDILOL 12.5 MG: 12.5 TABLET, FILM COATED ORAL at 13:43

## 2021-08-16 RX ADMIN — GABAPENTIN 300 MG: 300 CAPSULE ORAL at 09:55

## 2021-08-16 RX ADMIN — CLONIDINE HYDROCHLORIDE 0.05 MG: 0.1 TABLET ORAL at 13:44

## 2021-08-16 RX ADMIN — SERTRALINE HYDROCHLORIDE 100 MG: 100 TABLET ORAL at 13:43

## 2021-08-16 RX ADMIN — SODIUM CHLORIDE, POTASSIUM CHLORIDE, SODIUM LACTATE AND CALCIUM CHLORIDE 1000 ML: 600; 310; 30; 20 INJECTION, SOLUTION INTRAVENOUS at 21:39

## 2021-08-16 RX ADMIN — DICLOFENAC SODIUM 4 G: 10 GEL TOPICAL at 23:11

## 2021-08-16 RX ADMIN — AMLODIPINE BESYLATE 10 MG: 10 TABLET ORAL at 13:43

## 2021-08-16 RX ADMIN — ISOSORBIDE DINITRATE 10 MG: 10 TABLET ORAL at 13:44

## 2021-08-16 RX ADMIN — ISOSORBIDE DINITRATE 10 MG: 10 TABLET ORAL at 20:45

## 2021-08-16 RX ADMIN — DICLOFENAC SODIUM 4 G: 10 GEL TOPICAL at 09:56

## 2021-08-16 NOTE — PHARMACY-ADMISSION MEDICATION HISTORY
Addendum: Received med list from care facility. Updated acetaminophen from 1000mg BID --> 1000mg TID, removed ondansetron + bengay. Otherwise no changes.        Admission medication history interview status for this patient is complete. See [facility] admission navigator for allergy information, prior to admission medications and immunization status.     Medication history interview done, indicate source(s): Prescription records from [place], [place]  Medication history resources (including written lists, pill bottles, clinic record): SureScripts and Care Everywhere  Pharmacy: [facility] for discharge    Changes made to PTA medication list:  Added: calmoseptine  Changed: APAP 500mg TID PRN --> 1000mg BID + 1000mg daily prn.   Reported as Not Taking: -  Removed: docusate    Actions taken by pharmacist (provider contacted, etc): Called [facility] over 10 times throughout the weekend and left voicemails without response. No med list sent with EMS. Verified home med list per fill history.     Additional medication history information:None    Medication reconciliation/reorder completed by provider prior to medication history?  Y   (Y/N)     Prior to Admission medications    Medication Sig Last Dose Taking? Auth Provider   acetaminophen (TYLENOL) 500 MG tablet Take 1,000 mg by mouth 2 times daily  8/14/2021 at am Yes Unknown, Entered By History   amLODIPine (NORVASC) 10 MG tablet Take 10 mg by mouth daily 8/14/2021 at am Yes Unknown, Entered By History   Calcium Carbonate (CALCIUM 600 PO) Take 1 tablet by mouth 2 times daily  8/14/2021 at am Yes Reported, Patient   carvedilol (COREG) 25 MG tablet Take 25 mg by mouth At Bedtime 8/13/2021 at pm Yes Unknown, Entered By History   carvedilol (COREG) 25 MG tablet Take 12.5 mg by mouth every morning  8/14/2021 at am Yes Reported, Patient   Cholecalciferol (VITAMIN D3) 2000 UNITS CAPS Take 2,000 Units by mouth daily (with dinner) 8/13/2021 at pm Yes Unknown, Entered  By History   cloNIDine (CATAPRES) 0.1 MG tablet Take 0.05 mg by mouth 2 times daily  8/14/2021 at am Yes Unknown, Entered By History   diclofenac (VOLTAREN) 1 % topical gel Place 2 g onto the skin 3 times daily as needed for moderate pain Apply to right knee Past Week at Unknown time Yes Unknown, Entered By History   gabapentin (NEURONTIN) 100 MG capsule Take 2 capsules (200 mg) by mouth 2 times daily 8/14/2021 at am Yes Amanda Kenyon MD   HYDROmorphone (DILAUDID) 2 MG tablet Take 1 tablet (2 mg) by mouth 3 times daily as needed for breakthrough pain  Yes Darvin Evangelista PA-C   HYDROmorphone (DILAUDID) 2 MG tablet Take 1 tablet (2 mg) by mouth every 4 hours as needed for moderate to severe pain Past Week at Unknown time Yes Andre Mcmanus MD   isosorbide dinitrate (ISORDIL) 10 MG tablet Take 1 tablet (10 mg) by mouth 3 times daily 8/14/2021 at am Yes Marcy Soares PA-C   lisinopril (ZESTRIL) 30 MG tablet Take 30 mg by mouth daily 8/14/2021 at am Yes Unknown, Entered By History   melatonin 1 MG TABS tablet Take 1 tablet (1 mg) by mouth nightly as needed for sleep Past Week at Unknown time Yes Andre Mcmanus MD   menthol, Topical Analgesic, 2.5% (BENGAY VANISHIN SCENT) 2.5 % GEL topical gel Apply topically 4 times daily 8/14/2021 at am Yes Andre Mcmanus MD   menthol-zinc oxide (CALMOSEPTINE) 0.44-20.6 % OINT ointment Apply topically 2 times daily as needed for skin protection Apply to abrasion on left lower back until healed 8/14/2021 at am Yes Unknown, Entered By History   Multiple Vitamins-Minerals (PRESERVISION AREDS) CAPS Take 1 capsule by mouth 2 times daily  8/14/2021 at am Yes Unknown, Entered By History   omeprazole (PRILOSEC) 20 MG DR capsule Take 20 mg by mouth daily  8/14/2021 at am Yes Reported, Patient   polyethylene glycol (MIRALAX/GLYCOLAX) packet Take 17 g by mouth daily as needed for constipation Past Month at Unknown time Yes Gisela Mehta PA-C   potassium  chloride ER (KLOR-CON M) 10 MEQ CR tablet Take 10 mEq by mouth daily 8/14/2021 at am Yes Unknown, Entered By History   SENNA-docusate sodium (SENNA S) 8.6-50 MG tablet Take 1 tablet by mouth At Bedtime 8/13/2021 at pm Yes Love, SeemaKARTHIK Edmonds CNP   sertraline (ZOLOFT) 100 MG tablet Take 100 mg by mouth daily 8/14/2021 at am Yes Unknown, Entered By History   vitamin C (ASCORBIC ACID) 500 MG tablet Take 500 mg by mouth daily at 4pm  8/13/2021 at pm Yes Reported, Patient   acetaminophen (TYLENOL) 500 MG tablet Take 1,000 mg by mouth daily as needed for mild pain Unknown at Unknown time  Unknown, Entered By History   Lidocaine (LIDOCARE) 4 % Patch Place 2 patches onto the skin every 24 hours To prevent lidocaine toxicity, patient should be patch free for 12 hrs daily. Apply over spine. More than a month at Unknown time  Andre Mcmanus MD   ondansetron (ZOFRAN-ODT) 4 MG ODT tab Take 1 tablet (4 mg) by mouth every 6 hours as needed for nausea or vomiting More than a month at Unknown time  Andre Mcmanus MD

## 2021-08-16 NOTE — PLAN OF CARE
"Blood pressure (!) 161/74, pulse 67, temperature 97.5  F (36.4  C), temperature source Oral, resp. rate 16, height 1.549 m (5' 1\"), weight 58.1 kg (128 lb 1.6 oz), SpO2 90 %, not currently breastfeeding.    Neuro:   Patient is alert, orientated x 4, forgetful  Pulm:    WDL  CV:        WDL  GI:         WDL.  Patient tolerating evening meal tray, although consuming only one half of served foods.  :        Urine output marginal, Pure-wick in place  Pain:      Oxy given for complaints of pain with improvement. Patient is noted to be resting comfortably.  Voltaren gel applied x 2 with some improvement in comfort.   Plan:  Continue to provide supportive cares; pain assessment and control.  Patient teaching regarding ordered pain regimen.      "

## 2021-08-16 NOTE — CONSULTS
[facility]  Pain Service Consultation   Text Page    Date of Admission:  8/14/2021    Assessment & Plan   Daya Ignacio is a 89 year old female who was admitted on 8/14/2021. I was asked by Soo Luo to see the patient for pain management.    PLAN:   1)  Opioids:  - agree with dilaudid IV/PO prn while inpatient to manage pain during work up.  - would not recommend escalation of opiates from prior to admission dosing.    2)Non-opioid multimodal medication therapy  -Topical: Lidocaine patch daily, Voltaren gel four times daily to low back.  -N-SAIDS: contraindicated secondary to history of GIB  -Muscle Relaxants:Methocarbamol 500 mg every 8 hours prn muscle spasms  -Adjuvants:Acetaminophen 975 mg every 8 hours scheduled, Gabapentin 300 mg BID, Hydroxyzine 25 - 50 mg three times daily  -Antidepresants/anxiolytics: Hydroxyzine as above, Sertraline 100 mg daily (home dose)    3)  Non-medication interventions  Positioning, ICE, Relaxation, Distraction with television, Physical therapy for mobility and core strengthening, Brace as prescribed previously - as tolerated.    4)  Constipation Prophylaxis  - Senna-docusate 1-2 tablets BID prn  - Miralax daily prn    5) Medication Risk reduction strategies   -monitor for sedation   -Capnography per protocol   -narcan for opioid reversal     6)  Pain Education  -Opioid safe use, storage and disposal information included in DC AVS    7)  DC Planning   Discussed goal of Opioid therapy as above with primary team  Length of therapy is less than 10 days, opioids may be stopped without taper.  Continued outpatient management of pain per neurosurgery/pain clinic.  Disposition: pending  Outpatient Referrals: [facility] pain clinic - comprehensive assessment and intake.  Evaluate and treat per inpatient findings.  May be candidate for trigger point injections vs. HEATH if MRI findings indicate nerve root compression.    The following risk factors have  been identified for unintentional overdose: patient is > 65 years old, and patient has anxiety, depression or PTSD. Discharge with intra-nasal naloxone if discharged to home with opioids  >40 mg MME/day.  Plan for education prior to discharge if applicable.    ASSESSMENT  1)  Acute on chronic low back pain likely secondary to multiple compression fractures of the thoracolumbar spine (identified during prior hospitalizations).  Most recently found to have L3 compression fracture at which time neurosurgery prescribed back brace and conservative management.  No recent trauma per patient report.  History of falls.  Etiology may be more musculoskeletal as opposed to neuropathic given findings on assessment.    2)  Patient with subacute/chronic pain, documented hydromorphone use since previous hospitalization. Not found on PDMP review.   Reported use of PO dilaudid 2 mg every 4 hours as needed.    Patient's opioid use in past 24 hours: 8 mg PO dilaudid = 32 mg Daily Morphine Equivalent    3)  Risk factors for opioid related harms  - Age > 65 years old  -Anxiety/depression    4)  Opioid induced side-effects:  -Constipation - monitor for constipation, slow motility at baseline  -Sedation - high risk due to age and debility  -Urinary Retention - monitor for development of urinary retention.    5)  Other/Related:    -Depression/anxiety - likely component to acute pain complaints, on selective serotonin reuptake inhibitor chronically, stable mood.    Time Spent on this Encounter   Total unit/floor time 45 minutes, time consisted of the following, examination of the patient, reviewing the record and completing documentation. >50% of time spent in counseling and coordination of care.  Time spend counseling with patient consisted of the following topics, symptom management.  Time spent in coordination of care with Bedside Nurse Maisha Olsen and Hospitalist Nicole Hernandez.     Razia ANGELES, CNP  Pain Management and  Palliative Care  [facility]  Pgr: [phone]      Reason for Consult   Reason for consult: I was asked by Soo Luo to evaluate this patient for acute pain.    Primary Care Physician   Primary Care Physician:DINORAH DISLA  Pain Specialist: n/a    Chief Complaint   Acute on chronic low back pain.    History is obtained from the patient and electronic health record    History of Present Illness   Daya Ignacio is a 89 year old female with history of chronic pain, cognitive impairment, recurrent UTIs with known drug resistance, hypertension, hyperlipidemia who presented with acute on chronic severe low back pain due to multiple compression fractures of T11 - L4.  Patient has been seen as outpatient and managed by Neurosurgery as recently as 8/10, interventions including bracing and physical therapy have been tried and have not been effective with pain resolution.  On 8/14 patient's pain became unbearable and patient presented to the ED for further evaluation.      CURRENT PAIN:  Her pain is located in the low back  It is described as Aching, Exhausting, Gnawing, Sharp and Stabbing  She rates it as ranging between 7/10 and 10/10  The average is 8/10 on a scale of 0-10  Currently it is rated as 8/10  It improves by lying flat, ice, topicals, acetaminophen, hydromorphone  It worsens by prolonged sitting or standing, movement.  She has been compliant with the recommendations while in the hospital.      PAIN HISTORY:  The pain is mainly located in the low back  It is described as Aching, Sharp and Tiring  Rates it as ranging between 4/10 and 10/10  It improves by lying flat, wearing brace, physical therapy  It worsens by movement, twisting, standing.      PAST PAIN TREATMENT:   Medications: hydromorphone, acetaminophen, gabapentin, lidocaine patch, voltaren gel.  Non-phamacologic modalities: physical therapy, thoracolumbar bracing.  Previous interventions/surgeries: none.    [place] Board of  Pharmacy Data Base Reviewed:    YES; As expected, no concern for misuse/abuse of controlled medications based on this report.  Reported hydromorphone use prn since prior admission, not documented in the PDMP.           D.I.R.E Score: Patient Selection for Chronic Opioid Analgesia    For each factor, rate the patient's score from 1 - 3 based on the explanations on the right.       Diagnosis             3         1 = Benign chronic condition with minimal objective findings or no definite medical diagnosis.  Examples:  fibromyalgia, migraine, headaches, non-specific back pain.  2 = Slowly progressive condition concordant with moderate pain, or fixed condition with moderate objective findings.  Examples: failed back surgery syndrome, back pain with moderate degenerative changes, neuropathic pain.  3 = Advanced condition concordant with severe pain with objective findings.  Examples: severe ischemic vascular disease, advanced neuropathy, severe spinal stenosis.    Intractability             1         1 = Few therapies have been tried and the patient takes a passive role in his/her pain management process.   2 = Most costomary treatments have been tried but the patient is not fully engaged in the pain management process, or barriers prevent (insurance, transportation, medical illness)  3 = Patient fully engaged in a spectrum of appropriate treatments but with inadequate response.    Risk   (Risk = Total of P+C+R+S below)       Psychological             2         1 = Serious personality dysfunction or mental illness interfering with care.  Examples: personality disorder, severe affective disorder, significant personality issues.  2 = Personality or mental health interferes moderately.  Example: depression or anxiety disorder.  3 = Good communication with the clinic.  No significant personality dysfunction or mental illness.       Chemical      Health             3         1 = Active or very recent use of illicit drugs,  excessive alcohol, or prescription drug abuse.  2 = Chemical coper (uses medications to cope with stress) or history of chemical dependency in remission.  3 = No CD history.  Not drug-focused or chemically reliant       Reliability             3         1 = History of numerous problems: medication misuse, missed appointments, rarely follows through.  2 = Occasional difficulties with compliance, but generally reliable.  3 = Highly reliable patient with medications, appointments and treatment.       Social      Support             2         1= Life in chaos.  Little family support and few close relationships.  Loss of most normal life roles.  2 = Reduction in some relationships and life roles.  3 = Supportive family/close relationships.  Involved in work or school and no social isolation.    Efficacy score             1         1 = Poor function or minimal pain relief despite moderate to high doses.  2 = Moderate benefit with function improved in a number of ways (or insufficient info - hasn't tried opioid yet or very low doses or too short a trial.  3 = Good improvement in pain and function and quality of life with stable doses over time.                                    15    Total score = D + I + R + E    Score 7-13: Not a suitable candidate for long-term opioid analgesia  Score 14-21: May be a good candidate for long-term opioid analgesia    Copyright 2013 Nick Spann MD, The DIRE Score: Predicting Outcomes of Opioid Prescribing for Chronic Pain. The Journal of Pain. 7(9) (September), 2006:671-681    Past Medical History   I have reviewed this patient's medical history and updated it with pertinent information if needed.   Past Medical History:   Diagnosis Date     Anemia      Anxiety      Arthritis      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 4/16/2014     Depression      h/o Clostridium difficile colitis 06/2019     History of blood transfusion      Hypertension      Kidney stone      Lumbar compression  fracture (H)      Macular degeneration (senile) of retina, unspecified      Mild cognitive impairment      Mumps      Other chronic pain     low back     Recurrent UTI      Upper GI bleed 06/2019     Urinary tract infection due to extended-spectrum beta lactamase (ESBL)-producing Klebsiella 05/2019    resistant to Bactrim       Past Surgical History   I have reviewed this patient's surgical history and updated it with pertinent information if needed.  Past Surgical History:   Procedure Laterality Date     Cataract removal NOS Bilateral      COMBINED CYSTOSCOPY, INSERT STENT URETER(S) Left 5/6/2020    Procedure: Cystoscopy with left ureteral stent insertion;  Surgeon: Gaurav Munson MD;  Location:  OR     CYSTOSCOPY N/A 8/1/2019    Procedure: Exam under anesthesia, video cystopanendoscopy;  Surgeon: Dennis Carlson MD;  Location:  OR     ESOPHAGOSCOPY, GASTROSCOPY, DUODENOSCOPY (EGD), COMBINED N/A 6/11/2019    Procedure: ESOPHAGOGASTRODUODENOSCOPY (EGD);  Surgeon: Gaurav Degroot MD;  Location:  GI     EXTRACORPOREAL SHOCK WAVE LITHOTRIPSY (ESWL) Left 5/28/2020    Procedure: LEFT EXTRACORPOREAL SHOCK WAVE LITHOTRIPSY;  Surgeon: Gaurav Munson MD;  Location:  OR     Hip Pinning procedure Left      Hip replacement NOS Right      LASER HOLMIUM LITHOTRIPSY URETER(S), INSERT STENT, COMBINED Left 10/30/2020    Procedure: Cystoscopy, left ureteral stent exchange, left retrograde pyelogram, interpretation of fluoroscopic images, left ureteroscopy with holmium lithotripsy and stone basketing;  Surgeon: Gaurav Munson MD;  Location:  OR     OPEN REDUCTION INTERNAL FIXATION TIBIAL PLATEAU Right 8/28/2019    Procedure: Open reduction internal fixation right proximal tibia fracture;  Surgeon: oMlina Pardo MD;  Location:  OR     TONSILLECTOMY, ADENOIDECTOMY, COMBINED           Prior to Admission Medications   Prior to Admission Medications   Prescriptions Last Dose  Informant Patient Reported? Taking?   Calcium Carbonate (CALCIUM 600 PO) 8/14/2021 at am  Yes Yes   Sig: Take 1 tablet by mouth 2 times daily    Cholecalciferol (VITAMIN D3) 2000 UNITS CAPS 8/13/2021 at pm  Yes Yes   Sig: Take 2,000 Units by mouth daily (with dinner)   HYDROmorphone (DILAUDID) 2 MG tablet Past Week at Unknown time  No Yes   Sig: Take 1 tablet (2 mg) by mouth every 4 hours as needed for moderate to severe pain   Lidocaine (LIDOCARE) 4 % Patch More than a month at Unknown time  No No   Sig: Place 2 patches onto the skin every 24 hours To prevent lidocaine toxicity, patient should be patch free for 12 hrs daily. Apply over spine.   Multiple Vitamins-Minerals (PRESERVISION AREDS) CAPS 8/14/2021 at am  Yes Yes   Sig: Take 1 capsule by mouth 2 times daily    SENNA-docusate sodium (SENNA S) 8.6-50 MG tablet 8/13/2021 at pm  No Yes   Sig: Take 1 tablet by mouth At Bedtime   acetaminophen (TYLENOL) 500 MG tablet 8/14/2021 at am  Yes Yes   Sig: Take 1,000 mg by mouth 3 times daily    acetaminophen (TYLENOL) 500 MG tablet Unknown at Unknown time  Yes No   Sig: Take 1,000 mg by mouth daily as needed for mild pain   amLODIPine (NORVASC) 10 MG tablet 8/14/2021 at am  Yes Yes   Sig: Take 10 mg by mouth daily   carvedilol (COREG) 25 MG tablet 8/14/2021 at am  Yes Yes   Sig: Take 12.5 mg by mouth every morning    carvedilol (COREG) 25 MG tablet 8/13/2021 at pm  Yes Yes   Sig: Take 25 mg by mouth At Bedtime   cloNIDine (CATAPRES) 0.1 MG tablet 8/14/2021 at am  Yes Yes   Sig: Take 0.05 mg by mouth 2 times daily    diclofenac (VOLTAREN) 1 % topical gel Past Week at Unknown time  Yes Yes   Sig: Place 2 g onto the skin 3 times daily as needed for moderate pain Apply to right knee   gabapentin (NEURONTIN) 100 MG capsule 8/14/2021 at am  No Yes   Sig: Take 2 capsules (200 mg) by mouth 2 times daily   isosorbide dinitrate (ISORDIL) 10 MG tablet 8/14/2021 at am  No Yes   Sig: Take 1 tablet (10 mg) by mouth 3 times daily    lisinopril (ZESTRIL) 30 MG tablet 8/14/2021 at am  Yes Yes   Sig: Take 30 mg by mouth daily   melatonin 1 MG TABS tablet Past Week at Unknown time  No Yes   Sig: Take 1 tablet (1 mg) by mouth nightly as needed for sleep   menthol-zinc oxide (CALMOSEPTINE) 0.44-20.6 % OINT ointment 8/14/2021 at am  Yes Yes   Sig: Apply topically 2 times daily as needed for skin protection Apply to abrasion on left lower back until healed   omeprazole (PRILOSEC) 20 MG DR capsule 8/14/2021 at am  Yes Yes   Sig: Take 20 mg by mouth daily    polyethylene glycol (MIRALAX/GLYCOLAX) packet Past Month at Unknown time  No Yes   Sig: Take 17 g by mouth daily as needed for constipation   potassium chloride ER (KLOR-CON M) 10 MEQ CR tablet 8/14/2021 at am  Yes Yes   Sig: Take 10 mEq by mouth daily   sertraline (ZOLOFT) 100 MG tablet 8/14/2021 at am  Yes Yes   Sig: Take 100 mg by mouth daily   vitamin C (ASCORBIC ACID) 500 MG tablet 8/13/2021 at pm  Yes Yes   Sig: Take 500 mg by mouth daily at 4pm       Facility-Administered Medications: None     Allergies   Allergies   Allergen Reactions     Atorvastatin Muscle Pain (Myalgia)     Macrobid [Nitrofurantoin Anhydrous] Other (See Comments)     Body aches     Nitrofurantoin Muscle Pain (Myalgia) and Unknown     Body aches       Penicillins Unknown     Seasonal Allergies Unknown     Sulfa Drugs Other (See Comments)     Tolerated Bactrim May 2019  Entire family has allergy to Sulfa     Sulfasalazine Unknown       Social History   I have reviewed this patient's social history and updated it with pertinent information if needed. Daya Ignacio  reports that she has quit smoking. Her smoking use included cigarettes. She has never used smokeless tobacco. She reports that she does not drink alcohol and does not use drugs.    Family History   I have reviewed this patient's family history and updated it with pertinent information if needed.   Family History   Problem Relation Age of Onset     Alzheimer  Disease Mother         mild     Cardiovascular Father         heart attack     Neurologic Disorder Brother 83        Parkinson's     Family history of addiction - none    Review of Systems   The 10 point Review of Systems is negative other than noted in the HPI or here.    Denies Bowel or bladder dysfunction    Physical Exam   Temp:  [96.8  F (36  C)-97.8  F (36.6  C)] 96.8  F (36  C)  Pulse:  [64-75] 71  Resp:  [16] 16  BP: (113-170)/(44-74) 124/53  SpO2:  [88 %-96 %] 95 %  128 lbs 1.6 oz  GEN:  Alert, oriented to self and place, appears comfortable, No apparent distress.  HEENT:  Normocephalic/atraumatic, no scleral icterus, no nasal discharge, mouth moist.  CV:  RRR, S1, S2; no murmurs or other irregularities noted.  +3 DP/PT pulses bilaterally; no edema bilateral lower extremeties.  RESP:  Diminished to auscultation bilaterally without rales/rhonchi/wheezing/retractions.  Symmetric chest rise on inhalation noted.  Normal respiratory effort.  ABD:  Rounded, soft, non-tender/non-distended.  +BS  EXT:  Edema & pulses as noted above.  Color, moisture and sensation intact x 4.     M/S:   Tender to palpation over lower back Paraspinous    SKIN:  Dry to touch, no exanthems noted in the visualized areas.    NEURO: Symmetric strength +4/5.  Some difficulty with assessment questions/command following.  PAIN BEHAVIOR: Cooperative  Psych:  Flat affect.  Calm, cooperative, minimally conversant.       Data   Results for orders placed or performed during the hospital encounter of 08/14/21 (from the past 24 hour(s))   Platelet count   Result Value Ref Range    Platelet Count 215 150 - 450 10e3/uL   CBC with platelets differential    Narrative    The following orders were created for panel order CBC with platelets differential.  Procedure                               Abnormality         Status                     ---------                               -----------         ------                     CBC with platelets and  moy.[[id number]]  Abnormal            Final result                 Please view results for these tests on the individual orders.   Basic metabolic panel   Result Value Ref Range    Sodium 133 133 - 144 mmol/L    Potassium 3.8 3.4 - 5.3 mmol/L    Chloride 101 94 - 109 mmol/L    Carbon Dioxide (CO2) 28 20 - 32 mmol/L    Anion Gap 4 3 - 14 mmol/L    Urea Nitrogen 27 7 - 30 mg/dL    Creatinine 0.68 0.52 - 1.04 mg/dL    Calcium 9.8 8.5 - 10.1 mg/dL    Glucose 126 (H) 70 - 99 mg/dL    GFR Estimate 77 >60 mL/min/1.73m2   CBC with platelets and differential   Result Value Ref Range    WBC Count 9.5 4.0 - 11.0 10e3/uL    RBC Count 4.05 3.80 - 5.20 10e6/uL    Hemoglobin 12.8 11.7 - 15.7 g/dL    Hematocrit 38.3 35.0 - 47.0 %    MCV 95 78 - 100 fL    MCH 31.6 26.5 - 33.0 pg    MCHC 33.4 31.5 - 36.5 g/dL    RDW 13.2 10.0 - 15.0 %    Platelet Count 227 150 - 450 10e3/uL    % Neutrophils 71 %    % Lymphocytes 16 %    % Monocytes 9 %    % Eosinophils 3 %    % Basophils 0 %    % Immature Granulocytes 1 %    NRBCs per 100 WBC 0 <1 /100    Absolute Neutrophils 6.8 1.6 - 8.3 10e3/uL    Absolute Lymphocytes 1.6 0.8 - 5.3 10e3/uL    Absolute Monocytes 0.8 0.0 - 1.3 10e3/uL    Absolute Eosinophils 0.3 0.0 - 0.7 10e3/uL    Absolute Basophils 0.0 0.0 - 0.2 10e3/uL    Absolute Immature Granulocytes 0.1 (H) <=0.0 10e3/uL    Absolute NRBCs 0.0 10e3/uL

## 2021-08-16 NOTE — PROGRESS NOTES
[facility]    Medicine Progress Note - Hospitalist Service       Date of Admission:  8/14/2021    Assessment & Plan           Daya Ignacio is a 89 year old female with known multilevel compression fractures, ESBL UTIs, C diff, HTN, cognitive impairment, HTN, HLP and anxiety/depression, who was admitted on 8/14/2021 with worsening back pain due to multiple known compression fxs.   She suffered an acute L3 compression fracture about 12 weeks ago.  She also had imaging evidence of a subacute T12 and L2 fracture, old L1 burst fracture, and old compression fracture of T11.  She has had essentially constant pain since that time.  This radiates to the right side slightly.  It is sharp and worsens with movement.  Prolonged sitting and standing makes the pain worse.  Laying does improve the pain to some degree.  She was provided a brace and has been using this which has provided some assistance.  She denies any paresthesias, weakness, recent falls, saddle anesthesia, or bowel or bladder incontinence.  She previously participated in physical therapy and was discharged to do home exercises. Her pain is poorly controlled despite this. On admission she was vitally stable, lab work not performed but urine obtained on 8/13 due to hx of frequent infections is positive for nitrites and 28 WBC with greater than 100,000 colonies of Klebsiella pneumonia. She was given 1 dose of abx but stopped as pt is asymptomatic.     1. Intractable back pain  Known compression fractures at T11, T12, L2 and L3 with burst fracture of L1  In May 2021 imaging revealed an acute L3 compression fracture. She was braced and placed on a multimodal pain regimen (pain service assisted) with TCU discharge. She continues to wear the brace but pain is uncontrolled with Tylenol and Lidoderm patches. She followed up with neurosurgery on 8/10 who recommended a pain service referral and weaning of the brace. If pain persisted beyond 4-6 weeks  they would consider other measures (but patient tells me she would not want surgery). Repeat x ray of lumbar spine on 8/14 did not show new evidence of fracture. Pain remains uncontrolled, Neurosurgery consulted  -Continue Dilaudid 2 mg every 4 hrs, Lidoderm patches, voltaren gel, gabapentin 300 mg BID.   -Mild hypoxia noted today, improved with supplemental O2, possibly due to narcotics. Limit use as able, IS, and wean O2 as able  -PT consulted, recommending TCU  -Continue wear brace  -Neurosurgery consulted, recommending repeat MRI, pending. Pt does not wish for surgery, unclear if vertebroplasty would be indicated.     2. Abnormal UA - UTI vs colonization  Urinalysis from 8/13 abnormal with urine culture growing >100,000 colonies of Klebsiella pneumonia.  She has history of ESBL organisms but denies lower urinary tract symptoms.   -Ertapenem started on admission but discontinued 8/15 given lack of symptoms  -CBC added on, no leukocytosis, afebrile  -Watch for signs of infection or urinary symptoms     3. HTN  -resume PTA Amlodipine, Lisinopril, Clonidine and Carvedilol with parameters      4. Depression/anxiety  -resume PTA Sertraline     5. GERD  -resume PTA Prilosec    Covid-19 negative     Diet: Regular Diet Adult    DVT Prophylaxis: Enoxaparin (Lovenox) SQ  Almanza Catheter: Not present  Central Lines: None  Code Status: No CPR- Do NOT Intubate      Disposition Plan   Expected discharge: 08/16/2021 recommended to transitional care unit once pain controlled and Neurosurgery consult completed..     The patient's care was discussed with the Attending Physician, Dr. Caro, Bedside Nurse and Patient.    Nicole Hernandez PA-C  Hospitalist Service  [facility]  Securely message with the Vocera Web Console (learn more here)  Text page via [employer] Paging/Directory      Clinically Significant Risk Factors Present on Admission                    ______________________________________________________________________    Interval History   Pt denies pain initially or other complaint but then complained that her right side hurt, pt repositioned with pain behaviors noted. Neurosurgery consulted     Data reviewed today: I reviewed all medications, new labs and imaging results over the last 24 hours. I personally reviewed no images or EKG's today.    Physical Exam   Vital Signs: Temp: 97.5  F (36.4  C) Temp src: Axillary BP: 113/44 Pulse: 66   Resp: 16 SpO2: 96 % O2 Device: None (Room air)    Weight: 128 lbs 1.6 oz    GENERAL:  Comfortable.  PSYCH: pleasant, oriented, No acute distress.  HEART:  Normal S1, S2 with no murmur, no pericardial rub, gallops or S3 or S4.  LUNGS:  Clear to auscultation, normal Respiratory effort. No wheezing, rales or ronchi.  GI:  Soft. Non-tender, non distended.   EXTREMITIES:  Able to move all 4 extremities   SKIN:  Dry to touch, No rash, wound or ulcerations.  NEUROLOGIC:  Grossly intact    Data   Recent Labs   Lab 08/16/21  1157 08/16/21  0549   WBC 9.5  --    HGB 12.8  --    MCV 95  --     215     --    POTASSIUM 3.8  --    CHLORIDE 101  --    CO2 28  --    BUN 27  --    CR 0.68  --    ANIONGAP 4  --    DAVE 9.8  --    *  --      No results found for this or any previous visit (from the past 24 hour(s)).  Medications       acetaminophen  975 mg Oral Q8H     amLODIPine  10 mg Oral Daily     carvedilol  12.5 mg Oral QAM     carvedilol  25 mg Oral At Bedtime     cloNIDine  0.05 mg Oral BID     diclofenac  4 g Topical 4x Daily     enoxaparin ANTICOAGULANT  40 mg Subcutaneous Q24H     gabapentin  300 mg Oral At Bedtime     gabapentin  300 mg Oral Daily     hydrOXYzine  25 mg Oral TID    Or     hydrOXYzine  50 mg Oral TID     isosorbide dinitrate  10 mg Oral TID     lidocaine  2 patch Transdermal Q24h    And     lidocaine   Transdermal Q8H     lisinopril  30 mg Oral Daily     menthol   Transdermal Q8H     [START ON  8/17/2021] omeprazole  20 mg Oral Daily     sertraline  100 mg Oral Daily

## 2021-08-16 NOTE — PROGRESS NOTES
Care Management Follow Up    Length of Stay (days): 1    Expected Discharge Date: 08/16/2021     Concerns to be Addressed: Discharge planning      Patient plan of care discussed at interdisciplinary rounds: Yes    Anticipated Discharge Disposition:  TCU     Anticipated Discharge Services:  PT/OT    Patient/family educated on Medicare website which has current facility and service quality ratings:  Yes  Education Provided on the Discharge Plan:  Yes  Patient/Family in Agreement with the Plan:  Yes    Referrals Placed by CM/SW:  Skilled Nursing Facility  Private pay costs discussed: Not applicable    Additional Information:  SW received call from [facility], they have clinically accepted pt and have a private room available. They will start the insurance auth.     Spine surgery consult pending. Anticipated discharge dependent on plan of care, possible today vs tomorrow.     SW will continue to follow.     Your information has been submitted on August 16th, 2021 at 01:47:59 PM CDT. The confirmation number is [id number]    PRISCILA Andino

## 2021-08-16 NOTE — PLAN OF CARE
VSS, pt complaining of pain 7-8/10 while awake, throughout back. Was too drowsy to take prns.  Unable to urinate overnight, straight cath this AM for 800ml concentrated, yellow urine.

## 2021-08-17 VITALS
DIASTOLIC BLOOD PRESSURE: 32 MMHG | HEART RATE: 58 BPM | HEIGHT: 61 IN | WEIGHT: 128.1 LBS | TEMPERATURE: 97 F | SYSTOLIC BLOOD PRESSURE: 96 MMHG | BODY MASS INDEX: 24.19 KG/M2 | RESPIRATION RATE: 16 BRPM | OXYGEN SATURATION: 94 %

## 2021-08-17 LAB
CREAT SERPL-MCNC: 0.93 MG/DL (ref 0.52–1.04)
GFR SERPL CREATININE-BSD FRML MDRD: 54 ML/MIN/1.73M2

## 2021-08-17 PROCEDURE — 250N000011 HC RX IP 250 OP 636: Performed by: PHYSICIAN ASSISTANT

## 2021-08-17 PROCEDURE — 250N000013 HC RX MED GY IP 250 OP 250 PS 637: Performed by: PHYSICIAN ASSISTANT

## 2021-08-17 PROCEDURE — 36415 COLL VENOUS BLD VENIPUNCTURE: CPT | Performed by: PHYSICIAN ASSISTANT

## 2021-08-17 PROCEDURE — 99238 HOSP IP/OBS DSCHRG MGMT 30/<: CPT | Performed by: PHYSICIAN ASSISTANT

## 2021-08-17 PROCEDURE — 82565 ASSAY OF CREATININE: CPT | Performed by: PHYSICIAN ASSISTANT

## 2021-08-17 PROCEDURE — 250N000013 HC RX MED GY IP 250 OP 250 PS 637: Performed by: HOSPITALIST

## 2021-08-17 RX ORDER — HYDROXYZINE HYDROCHLORIDE 25 MG/1
25 TABLET, FILM COATED ORAL EVERY 8 HOURS PRN
Status: ON HOLD | DISCHARGE
Start: 2021-08-17 | End: 2021-10-09

## 2021-08-17 RX ORDER — HYDROMORPHONE HYDROCHLORIDE 2 MG/1
2 TABLET ORAL EVERY 4 HOURS PRN
Qty: 24 TABLET | Refills: 0 | Status: ON HOLD | OUTPATIENT
Start: 2021-08-17 | End: 2021-10-09

## 2021-08-17 RX ORDER — ACETAMINOPHEN 500 MG
1000 TABLET ORAL DAILY PRN
Status: ON HOLD | DISCHARGE
Start: 2021-08-17 | End: 2023-01-23

## 2021-08-17 RX ORDER — GABAPENTIN 100 MG/1
300 CAPSULE ORAL 2 TIMES DAILY
Qty: 120 CAPSULE | Refills: 0 | Status: ON HOLD | OUTPATIENT
Start: 2021-08-17 | End: 2021-10-09

## 2021-08-17 RX ADMIN — ACETAMINOPHEN 975 MG: 325 TABLET, FILM COATED ORAL at 06:12

## 2021-08-17 RX ADMIN — DICLOFENAC SODIUM 4 G: 10 GEL TOPICAL at 13:32

## 2021-08-17 RX ADMIN — GABAPENTIN 300 MG: 300 CAPSULE ORAL at 08:41

## 2021-08-17 RX ADMIN — ACETAMINOPHEN 975 MG: 325 TABLET, FILM COATED ORAL at 13:32

## 2021-08-17 RX ADMIN — SERTRALINE HYDROCHLORIDE 100 MG: 100 TABLET ORAL at 08:41

## 2021-08-17 RX ADMIN — HYDROXYZINE HYDROCHLORIDE 25 MG: 25 TABLET, FILM COATED ORAL at 08:41

## 2021-08-17 RX ADMIN — OMEPRAZOLE 20 MG: 20 CAPSULE, DELAYED RELEASE ORAL at 08:41

## 2021-08-17 RX ADMIN — DICLOFENAC SODIUM 4 G: 10 GEL TOPICAL at 10:41

## 2021-08-17 RX ADMIN — DICLOFENAC SODIUM 4 G: 10 GEL TOPICAL at 17:04

## 2021-08-17 RX ADMIN — ISOSORBIDE DINITRATE 10 MG: 10 TABLET ORAL at 13:31

## 2021-08-17 RX ADMIN — ENOXAPARIN SODIUM 40 MG: 40 INJECTION SUBCUTANEOUS at 10:45

## 2021-08-17 NOTE — PROGRESS NOTES
Care Management Follow Up    Length of Stay (days): 2    Expected Discharge Date: 08/17/2021     Concerns to be Addressed:       Patient plan of care discussed at interdisciplinary rounds: Yes    Anticipated Discharge Disposition:  TCU - [facility]     Anticipated Discharge Services:  Post acute facility  Anticipated Discharge DME:      Patient/family educated on Medicare website which has current facility and service quality ratings:  yes  Education Provided on the Discharge Plan:  yes  Patient/Family in Agreement with the Plan:  yes    Private pay costs discussed: transportation costs    Additional Information:  Received call from [facility], Wendy #[phone] fax#[phone], stating insurance auth completed. [facility] can accept patient discharging today. Update given to nursing. Will need orders for discharge if appropriate for discharge today.     1300:Called sonGael, left voice message.   1450: Discharge orders received and faxed to [facility]. Scripts faxed to [facility].     Call son and spoke with Gael. He is agreeable with patient discharging today to [facility]. Provider notified son would like an update. Discussed transportation plan. Son requested  [employer] transportation and is agreeable to cost, $75/baseline and $5 per mile to destination.     [facility] scheduled for 7:30pm. [facility].       Christina Watts RN Case Manager  Inpatient Care Coordination   [facility]   [phone]      Christina Watts RN

## 2021-08-17 NOTE — PLAN OF CARE
Vss afebrile. 93-95% ra. Pt forgetful but pleasant. Lower back pain in controlled with tylenol, Voltaren cream, and atarax. Pt up with assist of 1, walker and gait belt. Pt voiding in small amounts via bsc.

## 2021-08-17 NOTE — PROGRESS NOTES
MRI reviewed myself as well as with Dr. Cerrato  No changes compared to prior MRI   On exam 8/16, diffuse tenderness throughout midline and paraspinous muscles lower thoracic and lumbar region   Recommend pain team referral, outpatient follow up for comprehensive services  Patient not interested in surgery   Reviewed that vertebroplasty typically most beneficial when there is point tenderness over fracture; Merles tenderness is diffuse throughout; therefore, pain work up and follow up PRN with our office should symptoms persist    Dr. Cerrato, Hospitalist MD in agreement   Page on call provider with questions or concerns

## 2021-08-17 NOTE — PLAN OF CARE
"[id number]    Inpatient Progress Note:    /48 (BP Location: Right arm)   Pulse 60   Temp 97.7  F (36.5  C) (Oral)   Resp 18   Ht 1.549 m (5' 1\")   Wt 58.1 kg (128 lb 1.6 oz)   SpO2 93%   BMI 24.20 kg/m       Orientation: alert and oriented x4  Pain status: scheduled tylenol, atarax, and voltaren gel administered for lower back pain  Activity: up with assist of 1, gait belt and walker, brace in use; pt refused repositioning or activity at beginning of shift, but more willing throughout night to reposition and get up to bedside commode  Resp: WDL, LS clear bilaterally, continous pulse oximeter overnight - RA   Cardiac: WDL  GI: some abdominal discomfort at beginning of shift, BS active in all quadrants, denies nausea  : troubles with voiding - retaining urine, strains to void, urine cloudy and odorous, 236-354 ml PVRs, per MARGARITA Baker, do not straight cath until >500 ml, purewick in place overnight with no output - removed this AM  Skin: pale, abrasion to L lower back, bruising to forearms and hands  LDA: peripheral IV  Infusions: LR at 100 mL/hr x1 bag   Diet: regular  Consults: SW, PT, pain team, neuro surgery  Discharge Plan: [facility] patient and has a private room available, awaiting insurance authorization per DOM note, pain control    Will continue to monitor and provide cares.     Deandra Mcbride, RN         "

## 2021-08-17 NOTE — PROGRESS NOTES
Patient's After Visit Summary was reviewed with patient. Patient transferring to [facility]  Patient verbalized understanding of After Visit Summary, recommended follow up and was given an opportunity to ask questions.   Discharge medications sent home with patient/family: Not applicable   Discharged with other: [facility] transport. All belongings sent with patient.

## 2021-08-17 NOTE — PROVIDER NOTIFICATION
MARGARITA Otero, notified of patient's BP and no urine output since 0630, bladder scanned for 351 ml. LR bolus ordered.

## 2021-08-17 NOTE — PLAN OF CARE
"BP (!) 122/102 (BP Location: Left arm)   Pulse 65   Temp 98.5  F (36.9  C) (Oral)   Resp 20   Ht 1.549 m (5' 1\")   Wt 58.1 kg (128 lb 1.6 oz)   SpO2 96%   BMI 24.20 kg/m      Vitals stable, except patient requiring 1 L O2 to keep sats WDL. Turn and repo, lift assist. Patient refuses repositions often and likes to stay on her R side. Not motivated to get up today. One dose of oral dilaudid given this am but patient was too sleepy for PRN's the rest of the day. Atarax held. Scheduled tylenol and voltaren gel given. Patient was very sleepy today so Capno was placed, WDL. Reports 8-10 lower back pain when awake. Appears comfortable. No wincing or moaning today. MRI complete, see results. Patient has not urinated all shift, PO fluids encouraged and offered frequently. Provider notified. No new orders for IVF. Bladder scanned. Provider notified. Per MARGARITA Evans, do not straight cath until bladder scan is over 500mls. Tolerating 100% of regular diet. PT recommending TCU. SW following for safe discharge plan. Patient resting comfortably. Will continue to monitor and provide supportive cares.       "

## 2021-08-17 NOTE — DISCHARGE SUMMARY
[facility]  Hospitalist Discharge Summary      Date of Admission:  8/14/2021  Date of Discharge:  8/17/2021  Discharging Provider: Nicole Hernandez PA-C      Discharge Diagnoses   Intractable back pain  Known compression fx at T11, T12, L1-3  Abnormal UA    Follow-ups Needed After Discharge   Follow-up Appointments     Follow Up and recommended labs and tests      Follow up with primary care provider in 1-2 weeks.  No follow up labs or   test are needed.  Pain clinic referral placed on discharge. Consider trigger point   injections  Follow up with Neurosurgery in 2-3 weeks if pain is not improved.             Unresulted Labs Ordered in the Past 30 Days of this Admission     No orders found from 7/15/2021 to 8/15/2021.      These results will be followed up by PCP    Discharge Disposition   Discharged to rehabilitation facility  Condition at discharge: Stable  Patient ready to discharge to a skilled nursing facility as soon as possible in order to create capacity for patients related to the COVID-19 pandemic.    Hospital Course        Daya Ignacio is a 89 year old female with known multilevel compression fractures, ESBL UTIs, C diff, HTN, cognitive impairment, HTN, HLP and anxiety/depression, who was admitted on 8/14/2021 with worsening back pain due to multiple known compression fxs.   She suffered an acute L3 compression fracture about 12 weeks ago.  She also had imaging evidence of a subacute T12 and L2 fracture, old L1 burst fracture, and old compression fracture of T11.  She has had essentially constant pain since that time.  This radiates to the right side slightly.  It is sharp and worsens with movement.  Prolonged sitting and standing makes the pain worse.  Laying does improve the pain to some degree.  She was provided a brace and has been using this which has provided some assistance.  She denies any paresthesias, weakness, recent falls, saddle anesthesia, or bowel or bladder  incontinence.  She previously participated in physical therapy and was discharged to do home exercises. Her pain is poorly controlled despite this. On admission she was vitally stable, lab work not performed but urine obtained on 8/13 due to hx of frequent infections is positive for nitrites and 28 WBC with greater than 100,000 colonies of Klebsiella pneumonia. She was given 1 dose of abx but stopped as pt is asymptomatic.   Her pain slowly improved and her mobility improved enough to discharge to TCU for further rehab. Neurosurgery consulted and repeated MRI, no further intervention recommended. PT consulted, recommended TCU, SW assisted with placement.     1. Intractable back pain  Known compression fractures at T11, T12, L2 and L3 with burst fracture of L1  In May 2021 imaging revealed an acute L3 compression fracture. She was braced and placed on a multimodal pain regimen (pain service assisted) with TCU discharge. She continues to wear the brace but pain is uncontrolled with Tylenol and Lidoderm patches. She followed up with neurosurgery on 8/10 who recommended a pain service referral and weaning of the brace. If pain persisted beyond 4-6 weeks they would consider other measures (but patient tells me she would not want surgery). Repeat x ray of lumbar spine on 8/14 did not show new evidence of fracture. Pain remains uncontrolled, Neurosurgery consulted  -Continue Dilaudid 2 mg every 4 hrs, Lidoderm patches, voltaren gel, gabapentin 300 mg BID.   -pain improved today  -PT consulted, recommending TCU. SW assisted with placement   -Continue wear brace  -Neurosurgery consulted, repeated MRI, no changes. verterbroplasty not indicated at this time. Recommended trigger point injections, unable to do in hospital here.   -referral placed to pain clinic.      2. Abnormal UA - UTI vs colonization  Urinalysis from 8/13 abnormal with urine culture growing >100,000 colonies of Klebsiella pneumonia.  She has history of ESBL  organisms but denies lower urinary tract symptoms.   -Ertapenem started on admission but discontinued 8/15 given lack of symptoms  -No leukocytosis, afebrile     Covid-19 negative    Consultations This Hospital Stay   CARE MANAGEMENT / SOCIAL WORK IP CONSULT  CARE MANAGEMENT / SOCIAL WORK IP CONSULT  PHYSICAL THERAPY ADULT IP CONSULT  CARE MANAGEMENT / SOCIAL WORK IP CONSULT  SPINE SURGERY ADULT IP CONSULT  NEUROSURGERY IP CONSULT  PAIN MANAGEMENT ADULT IP CONSULT  OCCUPATIONAL THERAPY ADULT IP CONSULT  PHYSICAL THERAPY ADULT IP CONSULT    Code Status   No CPR- Do NOT Intubate    Time Spent on this Encounter   I, Nicole Hernandez PA-C, personally saw the patient today and spent less than or equal to 30 minutes discharging this patient.       Nicole Hernandez PA-C  [facility] OBSERVATION DEPT  [address] [place] [address]  Phone: [phone]  ______________________________________________________________________    Physical Exam   Vital Signs: Temp: 98  F (36.7  C) Temp src: Axillary BP: (!) 157/60 Pulse: 66   Resp: (!) 0 SpO2: 95 % O2 Device: None (Room air) Oxygen Delivery: 1 LPM  Weight: 128 lbs 1.6 oz    GENERAL:  Comfortable.  PSYCH: pleasant, oriented, No acute distress.  HEART:  Normal S1, S2 with no murmur, no pericardial rub, gallops or S3 or S4.  LUNGS:  Clear to auscultation, normal Respiratory effort. No wheezing, rales or ronchi.  GI:  Soft, normal bowel sounds. Non-tender, non distended.   EXTREMITIES:  Able to scoot herself to edge of bed, +2 pulses bilateral and equal.  SKIN:  Dry to touch, No rash, wound or ulcerations.  NEUROLOGIC:  Grossly intact       Primary Care Physician   DINORAH DISLA    Discharge Orders      Anti-Embolism Stockings    Bilateral below knee length.On in the morning, off at night     Medication Therapy Management Referral      Pain Management Referral      General info for SNF    Length of Stay Estimate: Short Term Care: Estimated # of Days  <30  Condition at Discharge: Stable  Level of care:skilled   Rehabilitation Potential: Fair  Admission H&P remains valid and up-to-date: Yes  Recent Chemotherapy: N/A  Use Nursing Home Standing Orders: Yes     Mantoux instructions    Give two-step Mantoux (PPD) Per Facility Policy Yes     Follow Up and recommended labs and tests    Follow up with primary care provider in 1-2 weeks.  No follow up labs or test are needed.  Pain clinic referral placed on discharge. Consider trigger point injections  Follow up with Neurosurgery in 2-3 weeks if pain is not improved.     Reason for your hospital stay    Ongoing back pain due to multiple compression fractures, which are fairly unchanged on MRI. Pain was difficult to control. Neurosurgery was consulted and repeated MRI. No surgical intervention recommended, vertebroplasty is not recommended given lack of point tenderness. They recommend trigger point injections for myofascial pain, which cannot be done here, referral to pain clinic done on discharge. PT consulted and recommended TCU. SW assisted with placement.     Bladder scan    X 2 for post void residual     Activity - Up with nursing assistance     Additional Discharge Instructions    Wear brace when up and out of bed     No CPR- Do NOT Intubate     Occupational Therapy Adult Consult    Evaluate and treat as clinically indicated.    Reason:  T11, L1-3 compression fx     Physical Therapy Adult Consult    Evaluate and treat as clinically indicated.    Reason:  T11, L1-3 compression fx     Contact Isolation     Fall precautions     Advance Diet as Tolerated    Follow this diet upon discharge: Regular       Significant Results and Procedures   Most Recent 3 CBC's:Recent Labs   Lab Test 08/16/21  1157 08/16/21  0549 05/28/21  0542 05/22/21  0659   WBC 9.5  --  6.2 5.5   HGB 12.8  --  9.0* 9.2*   MCV 95  --  96 93    215 199 222     Most Recent 3 BMP's:Recent Labs   Lab Test 08/17/21  0726 08/16/21  1157 05/28/21  0542  05/22/21  0659   NA  --  133 137 135   POTASSIUM  --  3.8 4.9 4.4   CHLORIDE  --  101 103 102   CO2  --  28 28 31   BUN  --  27 23 21   CR 0.93 0.68 0.66 0.69   ANIONGAP  --  4 6 2*   DAVE  --  9.8 9.1 9.2   GLC  --  126* 87 86     Most Recent 6 Bacteria Isolates From Any Culture (See EPIC Reports for Culture Details):Recent Labs   Lab Test 05/11/21  1613 05/11/21  1607 05/09/21  0120 11/02/20  0819 11/02/20  0817 10/31/20  1130   CULT No growth No growth >100,000 colonies/mL  Aerococcus urinae  Identification obtained by MALDI-TOF mass spectrometry research use only database. Test   characteristics determined and verified by the [facility]   ([facility]) [place], [place].  Aerococcus is usually susceptible to penicillin, cephalosporins, tetracycline and   vancomycin.  *  >100,000 colonies/mL  Escherichia coli ESBL  ESBL (extended beta lactamase) producing organisms require contact precautions.  * No growth No growth <10,000 colonies/mL  Proteus mirabilis  *  <10,000 colonies/mL  Enterococcus faecium  *  <10,000 colonies/mL  Strain 2  Enterococcus faecium  *  <10,000 colonies/mL  Candida glabrata complex  Susceptibility testing not routinely done  *  ID requested by Dr. Camejo on 11.2.20 at 1311. bw     Most Recent Urinalysis:Recent Labs   Lab Test 08/13/21  1710 04/07/20  1400   COLOR Light Yellow Yellow   APPEARANCE Turbid* Cloudy*   URINEGLC Negative Negative   URINEBILI Negative Negative   URINEKETONE Negative Negative   SG 1.018 1.013   UBLD Negative Moderate*   URINEPH 6.0 7.5   PROTEIN Negative 30 mg/dL*   UROBILINOGEN  --  <2.0 E.U./dL   NITRITE Positive* Negative   LEUKEST 250 Jus/uL* Large*   RBCU 2 >100*   WBCU 28* >100*   ,   Results for orders placed or performed during the hospital encounter of 08/14/21   Lumbar spine XR, 2-3 views    Narrative    LUMBAR SPINE TWO TO THREE VIEWS 8/14/2021 12:32 PM     COMPARISON: Thoracolumbar spine x-ray series 8/10/2021.    HISTORY: Acute  on chronic pain.      Impression    IMPRESSION: Compression fracture deformities of T11, L1, L2 and L3  again noted without change from the recent comparison study. Mild  degenerative retrolisthesis of L1 upon L2 again noted. Alignment  otherwise normal. No new fractures. Osteopenia of the visualized bones  again noted.     ZENY JERRY MD         SYSTEM ID:  [id number]   MR Lumbar Spine w/o Contrast    Narrative    MRI LUMBAR SPINE WITHOUT CONTRAST   8/16/2021 2:29 PM     HISTORY: Low back pain, greater than 6 weeks.     TECHNIQUE: Multiplanar multisequence MRI of the lumbar spine without  contrast.     COMPARISON: MRI of the lumbar spine 5/19/2021. Lumbar spine  radiographs 8/14/2021.     FINDINGS: Exam is significantly limited by motion-related artifacts.  No obvious change in multiple previously demonstrated vertebral body  fractures. Chronic T11 compression fracture with moderate- to-severe  height loss, T12 compression fracture with moderate height loss, L1  burst fracture with severe height loss, L2 compression fracture with  moderate height loss, and L3 compression fracture with moderate height  loss again noted. It is difficult to say with certainty whether there  may be slight progression of central vertebral body height loss of the  L3 vertebra compared to the prior examination due to artifact. The  degree of L3 vertebral height loss on radiographs of 8/14/2021 does  not appear significantly changed compared to radiographs of 6/29/2021.  Otherwise, vertebral body heights appear similar to prior MRI. There  is persistent T2/STIR hyperintense signal in the L3 vertebral body and  seen to a lesser degree in the L2 superior endplates and the T12  superior and inferior endplates, as before. There is some T2/STIR  hyperintense signal in the T12-L1 disc space, as before.    Mild-to-moderate kyphosis at the thoracolumbar junction related to  fracture deformities. Mild anterolisthesis of L2 on L3,  unchanged.    Varying degrees of multilevel disc desiccation and degenerative disc  height loss, most severe at L4-L5 and L5-S1, as before. Marginal  endplate osteophytes noted at multiple levels. Mild-to-moderate  multilevel degenerative facet disease, as before. Close apposition of  the spinous processes from L1 to L5, as before.    Moderate bordering on moderate-to-severe spinal canal stenosis with  mild flattening of the normal contour of the distal cord at the level  of T12-L1 appears similar to the prior examination. Moderate-  appearing spinal canal stenosis at L2-L3 is also unchanged. Lesser  degrees of spinal canal narrowing elsewhere. Varying degrees of  multilevel neural foraminal stenosis, better assessed on prior exam,  not obviously changed compared to prior. Fatty atrophy of the lower  posterior paraspinous muscles and the right psoas muscle, as before.  Partial visualization of a cystic mass in the left adnexal region  measuring up to 3.2 cm (series 1 image 17), incompletely evaluated.      Impression    IMPRESSION:  1. Significantly motion-limited exam.  2. Overall, no definite change compared to MRI of 5/19/2021.  3. Multiple vertebral body fractures in the lower thoracic and upper  to mid lumbar spine, as described.  4. Multilevel degenerative changes.  5. Moderate bordering on moderate-to-severe spinal canal stenosis with  mild flattening of the normal cord contour at the T12-L1 level.  Moderate spinal canal stenosis at L2-L3. These findings are unchanged.  6. Partially visualized cystic mass in the left adnexal region,  incompletely characterized. If it would change the patient's clinical  management, consider follow-up pelvic ultrasound.    WYATT MARCUM MD         SYSTEM ID:  [id number]       Discharge Medications   Current Discharge Medication List      START taking these medications    Details   hydrOXYzine (ATARAX) 25 MG tablet Take 1 tablet (25 mg) by mouth every 8 hours as needed for  itching    Associated Diagnoses: Osteoporotic compression fracture of spine, with delayed healing, subsequent encounter         CONTINUE these medications which have CHANGED    Details   !! acetaminophen (TYLENOL) 500 MG tablet Take 2 tablets (1,000 mg) by mouth daily as needed for mild pain    Comments: Do not exceed 4000 mg per 24 hrs  Associated Diagnoses: Osteoporotic compression fracture of spine, with delayed healing, subsequent encounter      gabapentin (NEURONTIN) 100 MG capsule Take 3 capsules (300 mg) by mouth 2 times daily  Qty: 120 capsule, Refills: 0    Associated Diagnoses: Other chronic pain      HYDROmorphone (DILAUDID) 2 MG tablet Take 1 tablet (2 mg) by mouth every 4 hours as needed for moderate to severe pain  Qty: 24 tablet, Refills: 0    Associated Diagnoses: Osteoporotic compression fracture of spine, with delayed healing, subsequent encounter       !! - Potential duplicate medications found. Please discuss with provider.      CONTINUE these medications which have NOT CHANGED    Details   !! acetaminophen (TYLENOL) 500 MG tablet Take 1,000 mg by mouth 3 times daily       amLODIPine (NORVASC) 10 MG tablet Take 10 mg by mouth daily      Calcium Carbonate (CALCIUM 600 PO) Take 1 tablet by mouth 2 times daily       !! carvedilol (COREG) 25 MG tablet Take 25 mg by mouth At Bedtime      !! carvedilol (COREG) 25 MG tablet Take 12.5 mg by mouth every morning       Cholecalciferol (VITAMIN D3) 2000 UNITS CAPS Take 2,000 Units by mouth daily (with dinner)      cloNIDine (CATAPRES) 0.1 MG tablet Take 0.05 mg by mouth 2 times daily       diclofenac (VOLTAREN) 1 % topical gel Place 2 g onto the skin 3 times daily as needed for moderate pain Apply to right knee      isosorbide dinitrate (ISORDIL) 10 MG tablet Take 1 tablet (10 mg) by mouth 3 times daily  Qty: 270 tablet, Refills: 3    Associated Diagnoses: Non-STEMI (non-ST elevated myocardial infarction) (H)      lisinopril (ZESTRIL) 30 MG tablet Take 30 mg  by mouth daily      melatonin 1 MG TABS tablet Take 1 tablet (1 mg) by mouth nightly as needed for sleep  Qty:      Associated Diagnoses: Insomnia, unspecified type      menthol-zinc oxide (CALMOSEPTINE) 0.44-20.6 % OINT ointment Apply topically 2 times daily as needed for skin protection Apply to abrasion on left lower back until healed      Multiple Vitamins-Minerals (PRESERVISION AREDS) CAPS Take 1 capsule by mouth 2 times daily       omeprazole (PRILOSEC) 20 MG DR capsule Take 20 mg by mouth daily       polyethylene glycol (MIRALAX/GLYCOLAX) packet Take 17 g by mouth daily as needed for constipation    Associated Diagnoses: Tibia/fibula fracture, right, closed, initial encounter      potassium chloride ER (KLOR-CON M) 10 MEQ CR tablet Take 10 mEq by mouth daily      SENNA-docusate sodium (SENNA S) 8.6-50 MG tablet Take 1 tablet by mouth At Bedtime    Associated Diagnoses: Constipation, unspecified constipation type      sertraline (ZOLOFT) 100 MG tablet Take 100 mg by mouth daily      vitamin C (ASCORBIC ACID) 500 MG tablet Take 500 mg by mouth daily at 4pm       Lidocaine (LIDOCARE) 4 % Patch Place 2 patches onto the skin every 24 hours To prevent lidocaine toxicity, patient should be patch free for 12 hrs daily. Apply over spine.    Associated Diagnoses: Osteoporotic compression fracture of spine, with delayed healing, subsequent encounter       !! - Potential duplicate medications found. Please discuss with provider.        Allergies   Allergies   Allergen Reactions     Atorvastatin Muscle Pain (Myalgia)     Macrobid [Nitrofurantoin Anhydrous] Other (See Comments)     Body aches     Nitrofurantoin Muscle Pain (Myalgia) and Unknown     Body aches       Penicillins Unknown     Seasonal Allergies Unknown     Sulfa Drugs Other (See Comments)     Tolerated Bactrim May 2019  Entire family has allergy to Sulfa     Sulfasalazine Unknown

## 2021-08-22 ENCOUNTER — LAB REQUISITION (OUTPATIENT)
Dept: LAB | Facility: CLINIC | Age: 86
End: 2021-08-22
Payer: COMMERCIAL

## 2021-08-22 DIAGNOSIS — R33.9 RETENTION OF URINE, UNSPECIFIED: ICD-10-CM

## 2021-08-22 LAB
ALBUMIN UR-MCNC: NEGATIVE MG/DL
AMORPH CRY #/AREA URNS HPF: ABNORMAL /HPF
APPEARANCE UR: ABNORMAL
BACTERIA #/AREA URNS HPF: ABNORMAL /HPF
BILIRUB UR QL STRIP: NEGATIVE
COLOR UR AUTO: ABNORMAL
GLUCOSE UR STRIP-MCNC: NEGATIVE MG/DL
HGB UR QL STRIP: ABNORMAL
HYALINE CASTS: 1 /LPF
KETONES UR STRIP-MCNC: NEGATIVE MG/DL
LEUKOCYTE ESTERASE UR QL STRIP: ABNORMAL
MUCOUS THREADS #/AREA URNS LPF: PRESENT /LPF
NITRATE UR QL: POSITIVE
PH UR STRIP: 5 [PH] (ref 5–7)
RBC URINE: 6 /HPF
SP GR UR STRIP: 1.01 (ref 1–1.03)
UROBILINOGEN UR STRIP-MCNC: <2 MG/DL
WBC CLUMPS #/AREA URNS HPF: PRESENT /HPF
WBC URINE: 18 /HPF

## 2021-08-22 PROCEDURE — 81001 URINALYSIS AUTO W/SCOPE: CPT | Performed by: GENERAL PRACTICE

## 2021-08-22 PROCEDURE — 87086 URINE CULTURE/COLONY COUNT: CPT | Mod: ORL | Performed by: GENERAL PRACTICE

## 2021-08-25 ENCOUNTER — LAB REQUISITION (OUTPATIENT)
Dept: LAB | Facility: CLINIC | Age: 86
End: 2021-08-25
Payer: COMMERCIAL

## 2021-08-25 DIAGNOSIS — G93.40 ENCEPHALOPATHY, UNSPECIFIED: ICD-10-CM

## 2021-08-25 LAB — BACTERIA UR CULT: ABNORMAL

## 2021-08-25 NOTE — TELEPHONE ENCOUNTER
"Welcome Packet returned to clinic with postal sticker \"RETURN TO SENDER/UNABLE TO FORWARD\"    Patient's address must've been updated between when they scheduled their appointment and now because their paperwork was sent to [id number] [place] [address] and now their chart lists their address as [address] [place] [address].    Resending Welcome Packet to [id number], [address] [place] [address].      Petra [place]  Patient Representative  [facility]  "

## 2021-08-26 LAB
ANION GAP SERPL CALCULATED.3IONS-SCNC: 9 MMOL/L (ref 5–18)
BUN SERPL-MCNC: 17 MG/DL (ref 8–28)
CALCIUM SERPL-MCNC: 9.9 MG/DL (ref 8.5–10.5)
CHLORIDE BLD-SCNC: 100 MMOL/L (ref 98–107)
CO2 SERPL-SCNC: 24 MMOL/L (ref 22–31)
CREAT SERPL-MCNC: 0.67 MG/DL (ref 0.6–1.1)
GFR SERPL CREATININE-BSD FRML MDRD: 78 ML/MIN/1.73M2
GLUCOSE BLD-MCNC: 81 MG/DL (ref 70–125)
POTASSIUM BLD-SCNC: 4 MMOL/L (ref 3.5–5)
SODIUM SERPL-SCNC: 133 MMOL/L (ref 136–145)

## 2021-08-26 PROCEDURE — 80048 BASIC METABOLIC PNL TOTAL CA: CPT | Performed by: GENERAL PRACTICE

## 2021-08-26 PROCEDURE — P9604 ONE-WAY ALLOW PRORATED TRIP: HCPCS | Performed by: GENERAL PRACTICE

## 2021-08-26 PROCEDURE — 36415 COLL VENOUS BLD VENIPUNCTURE: CPT | Performed by: GENERAL PRACTICE

## 2021-09-15 ENCOUNTER — OFFICE VISIT (OUTPATIENT)
Dept: PALLIATIVE MEDICINE | Facility: CLINIC | Age: 86
End: 2021-09-15
Payer: COMMERCIAL

## 2021-09-15 VITALS — SYSTOLIC BLOOD PRESSURE: 143 MMHG | HEART RATE: 63 BPM | DIASTOLIC BLOOD PRESSURE: 75 MMHG | OXYGEN SATURATION: 95 %

## 2021-09-15 DIAGNOSIS — S32.000S COMPRESSION FRACTURE OF LUMBAR VERTEBRA, UNSPECIFIED LUMBAR VERTEBRAL LEVEL, SEQUELA: Primary | ICD-10-CM

## 2021-09-15 DIAGNOSIS — R53.1 GENERALIZED WEAKNESS: ICD-10-CM

## 2021-09-15 DIAGNOSIS — G89.29 OTHER CHRONIC PAIN: ICD-10-CM

## 2021-09-15 DIAGNOSIS — M79.10 TRIGGER POINT: ICD-10-CM

## 2021-09-15 PROCEDURE — 99204 OFFICE O/P NEW MOD 45 MIN: CPT | Mod: 25 | Performed by: PHYSICAL MEDICINE & REHABILITATION

## 2021-09-15 PROCEDURE — 20553 NJX 1/MLT TRIGGER POINTS 3/>: CPT | Performed by: PHYSICAL MEDICINE & REHABILITATION

## 2021-09-15 RX ORDER — TRIAMCINOLONE ACETONIDE 40 MG/ML
40 INJECTION, SUSPENSION INTRA-ARTICULAR; INTRAMUSCULAR ONCE
Status: COMPLETED | OUTPATIENT
Start: 2021-09-15 | End: 2021-09-15

## 2021-09-15 RX ORDER — BUPIVACAINE HYDROCHLORIDE 5 MG/ML
9 INJECTION, SOLUTION EPIDURAL; INTRACAUDAL ONCE
Status: COMPLETED | OUTPATIENT
Start: 2021-09-15 | End: 2021-09-15

## 2021-09-15 RX ADMIN — BUPIVACAINE HYDROCHLORIDE 45 MG: 5 INJECTION, SOLUTION EPIDURAL; INTRACAUDAL at 10:12

## 2021-09-15 RX ADMIN — TRIAMCINOLONE ACETONIDE 40 MG: 40 INJECTION, SUSPENSION INTRA-ARTICULAR; INTRAMUSCULAR at 10:12

## 2021-09-15 ASSESSMENT — PAIN SCALES - GENERAL: PAINLEVEL: EXTREME PAIN (9)

## 2021-09-15 NOTE — PROGRESS NOTES
[facility] Consultation    Date of visit: 9/15/2021      Assessment:  Daya Ignacio is a 89 year old with past medical history including: HTN, Macular degeneration, Osteoporosis, CAD/NSTEMI, Anxiety, Depression, chronic back and hip pain who presents for evaluation and treatment of the following:    Portion of the history is provided by the patient's son, Gael.    1. Acute on chronic low back pain: Daya has a history of chronic intermittent low back pain but had severely worsening of pain in May of 2021. At that time an acute L3 compression fracture, a superior L4 endplate fracture was noted as well as chronic/subacute compression fractures of T11, T12, L1, L2. Daya has had significant back pain since that time that has not responded to oral medications, back bracing and gentle exercises. They were seen by spine surgery who recommended bracing and outpatient follow-up. They were again hospitalized on 8/14/21 due to difficulty with pain control and discharged to TCU, they are back in their assisted living community now.    Currently Daya continues to have significant low back pain. They are taking hydromorphone 2mg as needed with inconsistent pain control. They are also taking gabapentin which they have been for years. On exam they have significant pain with all ranges of motion and lumbar ROM is severely reduced in all planes. They have significant pain with palpation of the thoracolumbar paraspinals from T11-L4. Their lower extremity strength is 5/5 and symmetric, sensation to light touch is symmetric and intact.    Discussed that their pain is likely multifactorial including: Lumbar compression fracture, spondylosis and overlying myofascial pain. Given significant tenderness with palpation, recommended TPI and patient and son agreed. If limited improvement will try the medication adjustments detailed below.      Plan:  The following recommendations were  given to the patient. Diagnosis, treatment options, risks, benefits, and alternatives were discussed, and all questions were answered. The patient expressed understanding of the plan for management.     I am recommending a multidisciplinary treatment plan to help this patient better manage her pain.  This includes:     1. Physical Therapy: Ray, [facility] tried [facility] but wasn't able to participate well.  2. Clinical Health Pain Psychologist: jose Bell to have poor carryover given memory deficits.   3. Self Care Recommendations: Gentle progressive exercise that does not increase pain - gradually increase daily walking program.  Take mini breaks - 5 minutes of mindfullness a couple times a day.   4. Diagnostic Studies: reviewed XR and MRI with patient and son.  5. Medication Management:   1. No changes today  2. Consider increasing gabapentin  3. Consider methocarbamol       6. Further procedures recommended: TPI completed as detailed below  7. Follow up: Son to call in 2-3 weeks with update on efficacy of TPI that was completed today.      Saw Wyman [facility]          Reason for consultation:    Daya Ignacio is a 89 year old female who is seen in consultation today at the request of her primary care physician, Johann Hall     Consultation and Evaluation for: chronic pain    Review of [place] Prescription Monitoring Program (): Today I have also reviewed the patient's history of controlled substance use, as provided by [place] licensed pharmacies and prescriber dispensers.     Review of Pain Questionnaire: Please see the [facility] [place] health questionnaire, which the patient completed and reviewed with me in detail.    Review of Electronic Chart: Today I have also reviewed available medical information in the patient's medical record at [facility] ([facility]), including relevant provider notes, laboratory work, and imaging.     Daya Ignacio has not  "been seen at a pain clinic in the past.      Chief Complaint:    Chief Complaint   Patient presents with     Pain       Pain history:  Daya Ignacio is a 89 year old female who presents for initial evaluation of chief pain complaint of back pain.     Daya has a long standing history of chronic back pain for years. About 13 years ago they had a broken right hip and at that time their family moved her from [place] to [place]. Compression fractures in the lumbar spine were noted at that time.    A few years ago she was moved into an assisted living ([facility] in [place]). Daya continued to have low levels of back pain which would flare-up intermittently. They would ambulate with a walker because of pain and balance issues.    In May they began to have severely worsening low back pain. Their function and range of motion deteriorated and their son took Daya to [facility]. At that time they were diagnosed with additional subacute and acute compression fractures. Surgery was consulted and conservative cares were recommended. Since then they have continued to have severe bilateral low back pain with significant reductions in mobility and activity levels.    They are taking gabapentin and dilaudid. Dilaudid provies 50% relief at times but is inconsistent, today she had no relief from the dilaudid. The pain seems to worsen based on activity levels and the son reports she isn't ambulating as much as she used to.    Onset: May 2021  Location/Radiation: bilateral low back  Quality: aching, throbbing, constant  Severity/Intensity: 10/10 at worst, 8/10 at best, 7/10 on average  Aggravating factors include: \"standing, shifting weight\"  Relieving factors include: \"laying down\"  Red Flags: The patient denies bowel or bladder incontinence, parasthesias, weakness, saddle anesthesia, unintentional weight loss, or fever/chills/sweats.         Pain Treatments:  1. Medications:       Current pain medications:  -Gabapentin 300mg " BID  -Dilaudid 2mg q4h prn  -Tylenol 1000mg daily  -Lidocaine patches  -Voltaren gel       Previous pain medications:  -Oxycodone - felt off  -  2. Physical Therapy: [facility]     TENS unit: hasn't tried  3. Pain psychology: hasn't tried  4. Surgery: none  5. Injections: none  6. Alternative Therapies:    Chiropractic: hasn't tried    Acupuncture: hasn't tried      Diagnostic tests:  MRI of L-spine was completed on 8/15/21 was reviewed with the patient and shows:  FINDINGS: Exam is significantly limited by motion-related artifacts.  No obvious change in multiple previously demonstrated vertebral body  fractures. Chronic T11 compression fracture with moderate- to-severe  height loss, T12 compression fracture with moderate height loss, L1  burst fracture with severe height loss, L2 compression fracture with  moderate height loss, and L3 compression fracture with moderate height  loss again noted. It is difficult to say with certainty whether there  may be slight progression of central vertebral body height loss of the  L3 vertebra compared to the prior examination due to artifact. The  degree of L3 vertebral height loss on radiographs of 8/14/2021 does  not appear significantly changed compared to radiographs of 6/29/2021.  Otherwise, vertebral body heights appear similar to prior MRI. There  is persistent T2/STIR hyperintense signal in the L3 vertebral body and  seen to a lesser degree in the L2 superior endplates and the T12  superior and inferior endplates, as before. There is some T2/STIR  hyperintense signal in the T12-L1 disc space, as before.     Mild-to-moderate kyphosis at the thoracolumbar junction related to  fracture deformities. Mild anterolisthesis of L2 on L3, unchanged.     Varying degrees of multilevel disc desiccation and degenerative disc  height loss, most severe at L4-L5 and L5-S1, as before. Marginal  endplate osteophytes noted at multiple levels. Mild-to-moderate  multilevel degenerative facet disease,  as before. Close apposition of  the spinous processes from L1 to L5, as before.     Moderate bordering on moderate-to-severe spinal canal stenosis with  mild flattening of the normal contour of the distal cord at the level  of T12-L1 appears similar to the prior examination. Moderate-  appearing spinal canal stenosis at L2-L3 is also unchanged. Lesser  degrees of spinal canal narrowing elsewhere. Varying degrees of  multilevel neural foraminal stenosis, better assessed on prior exam,  not obviously changed compared to prior. Fatty atrophy of the lower  posterior paraspinous muscles and the right psoas muscle, as before.  Partial visualization of a cystic mass in the left adnexal region  measuring up to 3.2 cm (series 1 image 17), incompletely evaluated.                                                                      IMPRESSION:  1. Significantly motion-limited exam.  2. Overall, no definite change compared to MRI of 5/19/2021.  3. Multiple vertebral body fractures in the lower thoracic and upper  to mid lumbar spine, as described.  4. Multilevel degenerative changes.  5. Moderate bordering on moderate-to-severe spinal canal stenosis with  mild flattening of the normal cord contour at the T12-L1 level.  Moderate spinal canal stenosis at L2-L3. These findings are unchanged.  6. Partially visualized cystic mass in the left adnexal region,  incompletely characterized. If it would change the patient's clinical  management, consider follow-up pelvic ultrasound.     WYATT MARCUM MD       Labs:   Lab Results   Component Value Date    WBC 9.5 08/16/2021    WBC 5.5 05/22/2021     Lab Results   Component Value Date    RBC 4.05 08/16/2021    RBC 3.03 05/22/2021     Lab Results   Component Value Date    HGB 12.8 08/16/2021    HGB 9.2 05/22/2021     Lab Results   Component Value Date    HCT 38.3 08/16/2021    HCT 28.2 05/22/2021     Lab Results   Component Value Date    MCV 95 08/16/2021    MCV 93 05/22/2021     Lab  Results   Component Value Date    MCH 31.6 08/16/2021    MCH 30.4 05/22/2021     Lab Results   Component Value Date    MCHC 33.4 08/16/2021    MCHC 32.6 05/22/2021     Lab Results   Component Value Date    RDW 13.2 08/16/2021    RDW 14.1 05/22/2021     Lab Results   Component Value Date     08/16/2021     05/22/2021     Last Comprehensive Metabolic Panel:  Sodium   Date Value Ref Range Status   08/26/2021 133 (L) 136 - 145 mmol/L Final   05/22/2021 135 133 - 144 mmol/L Final     Potassium   Date Value Ref Range Status   08/26/2021 4.0 3.5 - 5.0 mmol/L Final   05/22/2021 4.4 3.4 - 5.3 mmol/L Final     Chloride   Date Value Ref Range Status   08/26/2021 100 98 - 107 mmol/L Final   05/22/2021 102 94 - 109 mmol/L Final     Carbon Dioxide   Date Value Ref Range Status   05/22/2021 31 20 - 32 mmol/L Final     Carbon Dioxide (CO2)   Date Value Ref Range Status   08/26/2021 24 22 - 31 mmol/L Final     Anion Gap   Date Value Ref Range Status   08/26/2021 9 5 - 18 mmol/L Final   05/22/2021 2 (L) 3 - 14 mmol/L Final     Glucose   Date Value Ref Range Status   08/26/2021 81 70 - 125 mg/dL Final   05/22/2021 86 70 - 99 mg/dL Final     Urea Nitrogen   Date Value Ref Range Status   08/26/2021 17 8 - 28 mg/dL Final   05/22/2021 21 7 - 30 mg/dL Final     Creatinine   Date Value Ref Range Status   08/26/2021 0.67 0.60 - 1.10 mg/dL Final   05/22/2021 0.69 0.52 - 1.04 mg/dL Final     GFR Estimate   Date Value Ref Range Status   08/26/2021 78 >60 mL/min/1.73m2 Final     Comment:     As of July 11, 2021, eGFR is calculated by the CKD-EPI creatinine equation, without race adjustment. eGFR can be influenced by muscle mass, exercise, and diet. The reported eGFR is an estimation only and is only applicable if the renal function is stable.   05/28/2021 >60 >60 mL/min/1.73m2 Final   05/22/2021 76 >60 mL/min/[1.73_m2] Final     Comment:     Non  GFR Calc  Starting 12/18/2018, serum creatinine based estimated GFR  (eGFR) will be   calculated using the Chronic Kidney Disease Epidemiology Collaboration   (CKD-EPI) equation.       Calcium   Date Value Ref Range Status   08/26/2021 9.9 8.5 - 10.5 mg/dL Final   05/22/2021 9.2 8.5 - 10.1 mg/dL Final     Bilirubin Total   Date Value Ref Range Status   11/03/2020 0.6 0.2 - 1.3 mg/dL Final     Alkaline Phosphatase   Date Value Ref Range Status   11/03/2020 144 40 - 150 U/L Final     ALT   Date Value Ref Range Status   11/03/2020 22 0 - 50 U/L Final     AST   Date Value Ref Range Status   11/03/2020 30 0 - 45 U/L Final                 Past Medical History:  Past Medical History:   Diagnosis Date     Anemia      Anxiety      Arthritis      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 4/16/2014     Depression      h/o Clostridium difficile colitis 06/2019     History of blood transfusion      Hypertension      Kidney stone      Lumbar compression fracture (H)      Macular degeneration (senile) of retina, unspecified      Mild cognitive impairment      Mumps      Other chronic pain     low back     Recurrent UTI      Upper GI bleed 06/2019     Urinary tract infection due to extended-spectrum beta lactamase (ESBL)-producing Klebsiella 05/2019    resistant to Bactrim       Past Surgical History:  Past Surgical History:   Procedure Laterality Date     Cataract removal NOS Bilateral      COMBINED CYSTOSCOPY, INSERT STENT URETER(S) Left 5/6/2020    Procedure: Cystoscopy with left ureteral stent insertion;  Surgeon: Gaurav Munson MD;  Location:  OR     CYSTOSCOPY N/A 8/1/2019    Procedure: Exam under anesthesia, video cystopanendoscopy;  Surgeon: Dennis Carlson MD;  Location:  OR     ESOPHAGOSCOPY, GASTROSCOPY, DUODENOSCOPY (EGD), COMBINED N/A 6/11/2019    Procedure: ESOPHAGOGASTRODUODENOSCOPY (EGD);  Surgeon: Gaurav Degroot MD;  Location:  GI     EXTRACORPOREAL SHOCK WAVE LITHOTRIPSY (ESWL) Left 5/28/2020    Procedure: LEFT EXTRACORPOREAL SHOCK WAVE LITHOTRIPSY;   Surgeon: Gaurav Munson MD;  Location: [facility] OR     Hip Pinning procedure Left      Hip replacement NOS Right      LASER HOLMIUM LITHOTRIPSY URETER(S), INSERT STENT, COMBINED Left 10/30/2020    Procedure: Cystoscopy, left ureteral stent exchange, left retrograde pyelogram, interpretation of fluoroscopic images, left ureteroscopy with holmium lithotripsy and stone basketing;  Surgeon: Gaurav Munson MD;  Location: [facility] OR     OPEN REDUCTION INTERNAL FIXATION TIBIAL PLATEAU Right 8/28/2019    Procedure: Open reduction internal fixation right proximal tibia fracture;  Surgeon: Molina Pardo MD;  Location: [facility] OR     TONSILLECTOMY, ADENOIDECTOMY, COMBINED         Medications:  Current Outpatient Medications   Medication Sig Dispense Refill     acetaminophen (TYLENOL) 500 MG tablet Take 2 tablets (1,000 mg) by mouth daily as needed for mild pain       acetaminophen (TYLENOL) 500 MG tablet Take 1,000 mg by mouth 3 times daily        amLODIPine (NORVASC) 10 MG tablet Take 10 mg by mouth daily       Calcium Carbonate (CALCIUM 600 PO) Take 1 tablet by mouth 2 times daily        carvedilol (COREG) 25 MG tablet Take 25 mg by mouth At Bedtime       carvedilol (COREG) 25 MG tablet Take 12.5 mg by mouth every morning        Cholecalciferol (VITAMIN D3) 2000 UNITS CAPS Take 2,000 Units by mouth daily (with dinner)       cloNIDine (CATAPRES) 0.1 MG tablet Take 0.05 mg by mouth 2 times daily        diclofenac (VOLTAREN) 1 % topical gel Place 2 g onto the skin 3 times daily as needed for moderate pain Apply to right knee       gabapentin (NEURONTIN) 100 MG capsule Take 3 capsules (300 mg) by mouth 2 times daily 120 capsule 0     HYDROmorphone (DILAUDID) 2 MG tablet Take 1 tablet (2 mg) by mouth every 4 hours as needed for moderate to severe pain 24 tablet 0     hydrOXYzine (ATARAX) 25 MG tablet Take 1 tablet (25 mg) by mouth every 8 hours as needed for itching       isosorbide dinitrate (ISORDIL) 10 MG tablet  Take 1 tablet (10 mg) by mouth 3 times daily 270 tablet 3     Lidocaine (LIDOCARE) 4 % Patch Place 2 patches onto the skin every 24 hours To prevent lidocaine toxicity, patient should be patch free for 12 hrs daily. Apply over spine.       lisinopril (ZESTRIL) 30 MG tablet Take 30 mg by mouth daily       melatonin 1 MG TABS tablet Take 1 tablet (1 mg) by mouth nightly as needed for sleep       menthol-zinc oxide (CALMOSEPTINE) 0.44-20.6 % OINT ointment Apply topically 2 times daily as needed for skin protection Apply to abrasion on left lower back until healed       Multiple Vitamins-Minerals (PRESERVISION AREDS) CAPS Take 1 capsule by mouth 2 times daily        omeprazole (PRILOSEC) 20 MG DR capsule Take 20 mg by mouth daily        polyethylene glycol (MIRALAX/GLYCOLAX) packet Take 17 g by mouth daily as needed for constipation       potassium chloride ER (KLOR-CON M) 10 MEQ CR tablet Take 10 mEq by mouth daily       SENNA-docusate sodium (SENNA S) 8.6-50 MG tablet Take 1 tablet by mouth At Bedtime       sertraline (ZOLOFT) 100 MG tablet Take 100 mg by mouth daily       vitamin C (ASCORBIC ACID) 500 MG tablet Take 500 mg by mouth daily at 4pm          Allergies:     Allergies   Allergen Reactions     Atorvastatin Muscle Pain (Myalgia)     Macrobid [Nitrofurantoin Anhydrous] Other (See Comments)     Body aches     Nitrofurantoin Muscle Pain (Myalgia) and Unknown     Body aches       Penicillins Unknown     Seasonal Allergies Unknown     Sulfa Drugs Other (See Comments)     Tolerated Bactrim May 2019  Entire family has allergy to Sulfa     Sulfasalazine Unknown       Social History:  Home situation: assisted living  Tobacco use: denies  Drug use: denies  Alcohol use: denies  History of chemical dependency treatment: denies  Mental health admissions: denies    Family history:  Family History   Problem Relation Age of Onset     Alzheimer Disease Mother         mild     Cardiovascular Father         heart attack      Neurologic Disorder Brother 83        Parkinson's       Review of Systems:    POSTIVE IN BOLD  GENERAL: fever/chills, fatigue, general unwell feeling, weight gain/loss.  HEAD/EYES:  headache, dizziness, or vision changes.    EARS/NOSE/THROAT:  Nosebleeds, hearing loss, sinus infection, earache, tinnitus.  IMMUNE:  Allergies, cancer, immune deficiency, or infections.  SKIN:  Urticaria, rash, hives  HEME/Lymphatic:   anemia, easy bruising, easy bleeding.  RESPIRATORY:  cough, wheezing, or shortness of breath  CARDIOVASCULAR/Circulation:  Extremity edema, syncope, hypertension, tachycardia, or angina.  GASTROINTESTINAL:  abdominal pain, nausea/emesis, diarrhea, constipation,  hematochezia, or melena.  ENDOCRINE:  Diabetes, steroid use,  thyroid disease or osteoporosis.  MUSCULOSKELETAL: neck pain, back pain, arthralgia, arthritis, or gout.  GENITOURINARY:  frequency, urgency, dysuria, difficulty voiding, hematuria or incontinence.  NEUROLOGIC:  weakness, numbness, paresthesias, seizure, tremor, stroke or memory loss.  PSYCHIATRIC:  depression, anxiety, stress, suicidal thoughts or mood swings.     Physical Exam:  Vitals:    09/15/21 0840   BP: (!) 143/75   Pulse: 63   SpO2: 95%     Exam:  Constitutional: Well developed, well nourished, appears stated age.  HEENT: Head atraumatic, normocephalic. Eyes without conjunctival injection or jaundice. Oropharynx clear.   Skin: Open sores on left lower flank - from heating pad  Extremities: Peripheral pulses intact. No clubbing, cyanosis, or edema.  Psychiatric/mental status: Alert, without lethargy or stupor. Speech fluent. Appropriate affect. Mood normal. Able to follow commands without difficulty.     Musculoskeletal exam:  Gait/Station/Posture: Gait is antalgic and unsteady. Able to ambulate with hand-hold assist.      Lumbar spine:  Range of motion severely reduced in all planes   Rotation/ext to right: painful    Rotation/ext to left: painful   Myofascial tenderness:   Bilateral lumbar paraspinals from T11-L4.  Focal tenderness: No SI joint, gluteal, piriformis, GT, or IT tenderness  SLR: neg        Neurologic exam:  CN:  Cranial nerves 2-12 are grossly intact  Motor Strength:  5/5 symmetric LE strength          Reflexes:       Patella L4:  R:  1/4 L: 1/4   Achilles S1:  R:  1/4 L: 1/4        Sensory:  (upper and lower extremities):   Light touch: normal       BILLING TIME DOCUMENTATION:   The total TIME spent on this patient on the date of the encounter/appointment was 57 minutes.      TOTAL TIME includes:   Time spent preparing to see the patient (reviewing records and tests)  Time spent face to face (or over the phone) with the patient  Time spent ordering tests, medications, procedures and referrals   Time spent Referring and communicating with other healthcare professionals   Time spent documenting clinical information in Epic     Saw Wyman DO  [facility]       [facility] - Procedure Note    Date of Visit: 9/15/2021    Pre procedure Diagnosis: myofascial pain/myositis 60.9   Post procedure Diagnosis: Same  Procedure performed: trigger point injections  Anesthesia: none  Complications: none  Operators: Saw Wyman DO    Indications:   Daya Ignacio is a 89 year old female with a history and exam detailed above.    Options/alternatives, benefits and risks were discussed with the patient including bleeding, infection, tissue trauma and pnuemothorax.  Questions were answered to her satisfaction and she agrees to proceed. Voluntary informed consent was obtained and signed.     Vitals were reviewed: Yes  Allergies were reviewed:  Yes   Medications were reviewed:  Yes  Pre-procedure pain score: 8/10    Procedure:  After getting informed consent, a Pause for the Cause was performed.    Trigger points were identified by patient, and marked when appropriate.  The area was prepped with Chloroprep.    Using clean technique,  injections were completed using a 25G, 1.5 inch needle.  After negative aspiration, injection was completed.  A total of 10 locations were injected.  When possible, tissue was retracted from the chest wall to avoid lung injury.    Muscle groups injected:  -Left thoracic and lumbar paraspinals in 4 locations in the T12-L3 region  -Right thoracic and lumbar paraspinals in 6 locations in the T12-L3 region    Injection solution contained:  40mg of kenalog and 9ml of 0.5% bupivacaine.    Hemostasis was achieved, the area was cleaned, and bandaids were placed when appropriate.  The patient tolerated the procedure well.  Breath sounds were normal.        Pre-procedure pain score: 8/10  Post-procedure pain score: 8/10     Assessment/Plan: Daya Ignacio is a 89 year old female s/p Bilateral lumbar paraspinal trigger point injections for chronic back pain.    1. Following today's procedure, the patient was advised to contact the [facility] for any of the following:   Fever, chills, or night sweats   New onset of pain, numbness, or weakness   Any questions/concerns regarding the procedure  If unable to contact the Pain Center, the patient was instructed to go to a local Emergency Room for any complications.   2. The patient should follow-up with the referring provider in 2-4 weeks for post-procedure evaluation.        Saw Wyman DO  [facility]

## 2021-09-15 NOTE — PATIENT INSTRUCTIONS
1. We completed trigger point injections today, give it 2-3 weeks and see how your pain responds.    2. If there is no relief with the injections we can increase your gabapentin and start a muscle relaxant (methocarbamol).    3. Continue the medications you are currently taking.    4. Follow up in 2-3 weeks via phone call to let us know if the injections helped.    ----------------------------------------------------------------  Clinic Number:  [phone]     Call with any questions about your care and for scheduling assistance.     Calls are returned Monday through Friday between 8 AM and 4:30 PM. We usually get back to you within 2 business days depending on the issue/request.    If we are prescribing your medications:    For opioid medication refills, call the clinic or send a [employer] message 7 days in advance.  Please include:    Name of requested medication    Name of the pharmacy.    For non-opioid medications, call your pharmacy directly to request a refill. Please allow 3-4 days to be processed.     Per [place] State Law:    All controlled substance prescriptions must be filled within 30 days of being written.      For those controlled substances allowing refills, pickup must occur within 30 days of last fill.      We believe regular attendance is key to your success in our program!      Any time you are unable to keep your appointment we ask that you call us at least 24 hours in advance to cancel.This will allow us to offer the appointment time to another patient.     Multiple missed appointments may lead to dismissal from the clinic.

## 2021-09-25 ENCOUNTER — HEALTH MAINTENANCE LETTER (OUTPATIENT)
Age: 86
End: 2021-09-25

## 2021-10-02 NOTE — TELEPHONE ENCOUNTER
Goal Outcome Evaluation:  Plan of Care Reviewed With: patient        Progress: improving  Outcome Summary: Patient alert and oriented Dr Norton changed patient to Po antibiotics IID continues up ad em tolerating regular food and chest tube continues to water seal changed atrium out , patient turned over in bathroom safety maintained.   Patient needs chronic pain overview on problem list

## 2021-10-06 ENCOUNTER — APPOINTMENT (OUTPATIENT)
Dept: GENERAL RADIOLOGY | Facility: CLINIC | Age: 86
DRG: 536 | End: 2021-10-06
Attending: EMERGENCY MEDICINE
Payer: COMMERCIAL

## 2021-10-06 ENCOUNTER — HOSPITAL ENCOUNTER (INPATIENT)
Facility: CLINIC | Age: 86
LOS: 3 days | Discharge: NURSING, REHAB OR LONG-TERM CARE | DRG: 536 | End: 2021-10-09
Attending: EMERGENCY MEDICINE | Admitting: HOSPITALIST
Payer: COMMERCIAL

## 2021-10-06 ENCOUNTER — TRANSFERRED RECORDS (OUTPATIENT)
Dept: HEALTH INFORMATION MANAGEMENT | Facility: CLINIC | Age: 86
End: 2021-10-06
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
## 2021-10-06 DIAGNOSIS — S32.592A CLOSED FRACTURE OF MULTIPLE PUBIC RAMI, LEFT, INITIAL ENCOUNTER (H): ICD-10-CM

## 2021-10-06 DIAGNOSIS — M54.50 ACUTE MIDLINE LOW BACK PAIN, UNSPECIFIED WHETHER SCIATICA PRESENT: ICD-10-CM

## 2021-10-06 DIAGNOSIS — S32.000S COMPRESSION FRACTURE OF LUMBAR VERTEBRA, UNSPECIFIED LUMBAR VERTEBRAL LEVEL, SEQUELA: Primary | ICD-10-CM

## 2021-10-06 DIAGNOSIS — M80.88XG OSTEOPOROTIC COMPRESSION FRACTURE OF SPINE, WITH DELAYED HEALING, SUBSEQUENT ENCOUNTER: ICD-10-CM

## 2021-10-06 LAB
ANION GAP SERPL CALCULATED.3IONS-SCNC: 6 MMOL/L (ref 3–14)
BASOPHILS # BLD AUTO: 0 10E3/UL (ref 0–0.2)
BASOPHILS NFR BLD AUTO: 0 %
BUN SERPL-MCNC: 39 MG/DL (ref 7–30)
CALCIUM SERPL-MCNC: 9.2 MG/DL (ref 8.5–10.1)
CHLORIDE BLD-SCNC: 103 MMOL/L (ref 94–109)
CO2 SERPL-SCNC: 25 MMOL/L (ref 20–32)
CREAT SERPL-MCNC: 1.34 MG/DL (ref 0.52–1.04)
EOSINOPHIL # BLD AUTO: 0.1 10E3/UL (ref 0–0.7)
EOSINOPHIL NFR BLD AUTO: 1 %
ERYTHROCYTE [DISTWIDTH] IN BLOOD BY AUTOMATED COUNT: 16.6 % (ref 10–15)
GFR SERPL CREATININE-BSD FRML MDRD: 35 ML/MIN/1.73M2
GLUCOSE BLD-MCNC: 116 MG/DL (ref 70–99)
HCT VFR BLD AUTO: 24.1 % (ref 35–47)
HGB BLD-MCNC: 7.8 G/DL (ref 11.7–15.7)
IMM GRANULOCYTES # BLD: 0.2 10E3/UL
IMM GRANULOCYTES NFR BLD: 1 %
LYMPHOCYTES # BLD AUTO: 0.9 10E3/UL (ref 0.8–5.3)
LYMPHOCYTES NFR BLD AUTO: 7 %
MCH RBC QN AUTO: 34.4 PG (ref 26.5–33)
MCHC RBC AUTO-ENTMCNC: 32.4 G/DL (ref 31.5–36.5)
MCV RBC AUTO: 106 FL (ref 78–100)
MONOCYTES # BLD AUTO: 0.8 10E3/UL (ref 0–1.3)
MONOCYTES NFR BLD AUTO: 6 %
NEUTROPHILS # BLD AUTO: 10.9 10E3/UL (ref 1.6–8.3)
NEUTROPHILS NFR BLD AUTO: 85 %
NRBC # BLD AUTO: 0 10E3/UL
NRBC BLD AUTO-RTO: 0 /100
PLATELET # BLD AUTO: 127 10E3/UL (ref 150–450)
POTASSIUM BLD-SCNC: 4.9 MMOL/L (ref 3.4–5.3)
RBC # BLD AUTO: 2.27 10E6/UL (ref 3.8–5.2)
SODIUM SERPL-SCNC: 134 MMOL/L (ref 133–144)
WBC # BLD AUTO: 13 10E3/UL (ref 4–11)

## 2021-10-06 PROCEDURE — 258N000003 HC RX IP 258 OP 636: Performed by: PHYSICIAN ASSISTANT

## 2021-10-06 PROCEDURE — 258N000003 HC RX IP 258 OP 636: Performed by: EMERGENCY MEDICINE

## 2021-10-06 PROCEDURE — 71045 X-RAY EXAM CHEST 1 VIEW: CPT

## 2021-10-06 PROCEDURE — 80048 BASIC METABOLIC PNL TOTAL CA: CPT | Performed by: EMERGENCY MEDICINE

## 2021-10-06 PROCEDURE — 36415 COLL VENOUS BLD VENIPUNCTURE: CPT | Performed by: EMERGENCY MEDICINE

## 2021-10-06 PROCEDURE — 120N000004 HC R&B MS OVERFLOW

## 2021-10-06 PROCEDURE — G0378 HOSPITAL OBSERVATION PER HR: HCPCS

## 2021-10-06 PROCEDURE — 250N000013 HC RX MED GY IP 250 OP 250 PS 637: Performed by: EMERGENCY MEDICINE

## 2021-10-06 PROCEDURE — 27197 CLSD TX PELVIC RING FX: CPT

## 2021-10-06 PROCEDURE — 72100 X-RAY EXAM L-S SPINE 2/3 VWS: CPT

## 2021-10-06 PROCEDURE — 72170 X-RAY EXAM OF PELVIS: CPT

## 2021-10-06 PROCEDURE — C9803 HOPD COVID-19 SPEC COLLECT: HCPCS

## 2021-10-06 PROCEDURE — 99223 1ST HOSP IP/OBS HIGH 75: CPT | Mod: AI | Performed by: HOSPITALIST

## 2021-10-06 PROCEDURE — 99207 PR APP CREDIT; MD BILLING SHARED VISIT: CPT | Performed by: PHYSICIAN ASSISTANT

## 2021-10-06 PROCEDURE — 85025 COMPLETE CBC W/AUTO DIFF WBC: CPT | Performed by: EMERGENCY MEDICINE

## 2021-10-06 PROCEDURE — 250N000013 HC RX MED GY IP 250 OP 250 PS 637: Performed by: PHYSICIAN ASSISTANT

## 2021-10-06 PROCEDURE — 99285 EMERGENCY DEPT VISIT HI MDM: CPT | Mod: 25

## 2021-10-06 PROCEDURE — U0003 INFECTIOUS AGENT DETECTION BY NUCLEIC ACID (DNA OR RNA); SEVERE ACUTE RESPIRATORY SYNDROME CORONAVIRUS 2 (SARS-COV-2) (CORONAVIRUS DISEASE [COVID-19]), AMPLIFIED PROBE TECHNIQUE, MAKING USE OF HIGH THROUGHPUT TECHNOLOGIES AS DESCRIBED BY CMS-2020-01-R: HCPCS | Performed by: EMERGENCY MEDICINE

## 2021-10-06 RX ORDER — POLYETHYLENE GLYCOL 3350 17 G/17G
17 POWDER, FOR SOLUTION ORAL DAILY PRN
Status: DISCONTINUED | OUTPATIENT
Start: 2021-10-06 | End: 2021-10-09 | Stop reason: HOSPADM

## 2021-10-06 RX ORDER — POTASSIUM CHLORIDE 750 MG/1
10 TABLET, EXTENDED RELEASE ORAL DAILY
Status: DISCONTINUED | OUTPATIENT
Start: 2021-10-07 | End: 2021-10-09 | Stop reason: HOSPADM

## 2021-10-06 RX ORDER — CARVEDILOL 12.5 MG/1
12.5 TABLET ORAL EVERY MORNING
Status: DISCONTINUED | OUTPATIENT
Start: 2021-10-07 | End: 2021-10-09 | Stop reason: HOSPADM

## 2021-10-06 RX ORDER — ACETAMINOPHEN 500 MG
1000 TABLET ORAL 3 TIMES DAILY
Status: DISCONTINUED | OUTPATIENT
Start: 2021-10-07 | End: 2021-10-09 | Stop reason: HOSPADM

## 2021-10-06 RX ORDER — NALOXONE HYDROCHLORIDE 0.4 MG/ML
0.2 INJECTION, SOLUTION INTRAMUSCULAR; INTRAVENOUS; SUBCUTANEOUS
Status: DISCONTINUED | OUTPATIENT
Start: 2021-10-06 | End: 2021-10-09 | Stop reason: HOSPADM

## 2021-10-06 RX ORDER — CLONIDINE HYDROCHLORIDE 0.1 MG/1
0.1 TABLET ORAL AT BEDTIME
Status: DISCONTINUED | OUTPATIENT
Start: 2021-10-06 | End: 2021-10-09 | Stop reason: HOSPADM

## 2021-10-06 RX ORDER — ONDANSETRON 2 MG/ML
4 INJECTION INTRAMUSCULAR; INTRAVENOUS EVERY 6 HOURS PRN
Status: DISCONTINUED | OUTPATIENT
Start: 2021-10-06 | End: 2021-10-09 | Stop reason: HOSPADM

## 2021-10-06 RX ORDER — LIDOCAINE 4 G/G
2 PATCH TOPICAL EVERY 24 HOURS
Status: DISCONTINUED | OUTPATIENT
Start: 2021-10-06 | End: 2021-10-09 | Stop reason: HOSPADM

## 2021-10-06 RX ORDER — HYDROXYZINE HYDROCHLORIDE 25 MG/1
25 TABLET, FILM COATED ORAL EVERY 8 HOURS PRN
Status: DISCONTINUED | OUTPATIENT
Start: 2021-10-06 | End: 2021-10-08

## 2021-10-06 RX ORDER — ONDANSETRON 4 MG/1
4 TABLET, ORALLY DISINTEGRATING ORAL EVERY 6 HOURS PRN
Status: DISCONTINUED | OUTPATIENT
Start: 2021-10-06 | End: 2021-10-09 | Stop reason: HOSPADM

## 2021-10-06 RX ORDER — GABAPENTIN 300 MG/1
300 CAPSULE ORAL 2 TIMES DAILY
Status: DISCONTINUED | OUTPATIENT
Start: 2021-10-06 | End: 2021-10-08

## 2021-10-06 RX ORDER — AMLODIPINE BESYLATE 10 MG/1
10 TABLET ORAL DAILY
Status: DISCONTINUED | OUTPATIENT
Start: 2021-10-07 | End: 2021-10-09 | Stop reason: HOSPADM

## 2021-10-06 RX ORDER — SODIUM CHLORIDE 9 MG/ML
INJECTION, SOLUTION INTRAVENOUS CONTINUOUS
Status: ACTIVE | OUTPATIENT
Start: 2021-10-06 | End: 2021-10-07

## 2021-10-06 RX ORDER — SERTRALINE HYDROCHLORIDE 100 MG/1
100 TABLET, FILM COATED ORAL DAILY
Status: DISCONTINUED | OUTPATIENT
Start: 2021-10-07 | End: 2021-10-09 | Stop reason: HOSPADM

## 2021-10-06 RX ORDER — ISOSORBIDE DINITRATE 10 MG/1
10 TABLET ORAL 3 TIMES DAILY
Status: DISCONTINUED | OUTPATIENT
Start: 2021-10-07 | End: 2021-10-09 | Stop reason: HOSPADM

## 2021-10-06 RX ORDER — CARVEDILOL 25 MG/1
25 TABLET ORAL AT BEDTIME
Status: DISCONTINUED | OUTPATIENT
Start: 2021-10-06 | End: 2021-10-09 | Stop reason: HOSPADM

## 2021-10-06 RX ORDER — ASCORBIC ACID 500 MG
500 TABLET ORAL DAILY
Status: DISCONTINUED | OUTPATIENT
Start: 2021-10-07 | End: 2021-10-09 | Stop reason: HOSPADM

## 2021-10-06 RX ORDER — HYDROMORPHONE HCL IN WATER/PF 6 MG/30 ML
0.2 PATIENT CONTROLLED ANALGESIA SYRINGE INTRAVENOUS
Status: DISCONTINUED | OUTPATIENT
Start: 2021-10-06 | End: 2021-10-07

## 2021-10-06 RX ORDER — HYDROMORPHONE HYDROCHLORIDE 2 MG/1
2 TABLET ORAL 2 TIMES DAILY
Status: ON HOLD | COMMUNITY
End: 2021-10-09

## 2021-10-06 RX ORDER — ACETAMINOPHEN 500 MG
1000 TABLET ORAL ONCE
Status: COMPLETED | OUTPATIENT
Start: 2021-10-06 | End: 2021-10-06

## 2021-10-06 RX ORDER — VITAMIN B COMPLEX
50 TABLET ORAL DAILY
Status: DISCONTINUED | OUTPATIENT
Start: 2021-10-07 | End: 2021-10-09 | Stop reason: HOSPADM

## 2021-10-06 RX ORDER — NALOXONE HYDROCHLORIDE 0.4 MG/ML
0.4 INJECTION, SOLUTION INTRAMUSCULAR; INTRAVENOUS; SUBCUTANEOUS
Status: DISCONTINUED | OUTPATIENT
Start: 2021-10-06 | End: 2021-10-09 | Stop reason: HOSPADM

## 2021-10-06 RX ORDER — HYDROMORPHONE HYDROCHLORIDE 2 MG/1
2 TABLET ORAL EVERY 4 HOURS PRN
Status: DISCONTINUED | OUTPATIENT
Start: 2021-10-06 | End: 2021-10-09 | Stop reason: HOSPADM

## 2021-10-06 RX ORDER — ALBUTEROL SULFATE 90 UG/1
4 AEROSOL, METERED RESPIRATORY (INHALATION) ONCE
Status: COMPLETED | OUTPATIENT
Start: 2021-10-06 | End: 2021-10-06

## 2021-10-06 RX ORDER — LIDOCAINE 40 MG/G
CREAM TOPICAL
Status: DISCONTINUED | OUTPATIENT
Start: 2021-10-06 | End: 2021-10-09 | Stop reason: HOSPADM

## 2021-10-06 RX ORDER — AMOXICILLIN 250 MG
1 CAPSULE ORAL DAILY
Status: DISCONTINUED | OUTPATIENT
Start: 2021-10-07 | End: 2021-10-07

## 2021-10-06 RX ADMIN — SODIUM CHLORIDE, PRESERVATIVE FREE: 5 INJECTION INTRAVENOUS at 22:28

## 2021-10-06 RX ADMIN — LIDOCAINE 2 PATCH: 246 PATCH TOPICAL at 22:35

## 2021-10-06 RX ADMIN — CLONIDINE HYDROCHLORIDE 0.1 MG: 0.1 TABLET ORAL at 22:29

## 2021-10-06 RX ADMIN — SODIUM CHLORIDE 500 ML: 9 INJECTION, SOLUTION INTRAVENOUS at 20:02

## 2021-10-06 RX ADMIN — GABAPENTIN 300 MG: 300 CAPSULE ORAL at 22:28

## 2021-10-06 RX ADMIN — CARVEDILOL 25 MG: 25 TABLET, FILM COATED ORAL at 22:29

## 2021-10-06 RX ADMIN — ALBUTEROL SULFATE 4 PUFF: 90 AEROSOL, METERED RESPIRATORY (INHALATION) at 21:07

## 2021-10-06 RX ADMIN — ACETAMINOPHEN 1000 MG: 500 TABLET, FILM COATED ORAL at 19:23

## 2021-10-06 ASSESSMENT — ENCOUNTER SYMPTOMS
BACK PAIN: 1
APPETITE CHANGE: 0

## 2021-10-06 NOTE — CONSULTS
See ED note for care management Social Work consult.     PRISCILA Maria, [facility]  , ED Care Management   [phone]

## 2021-10-06 NOTE — ED PROVIDER NOTES
History     Chief Complaint:  Fall and Pubis Ramus Fracture      HPI     Limited history as patient comes from a memory care unit    Daya Ignacio is a 89 year old female who presents with a fall and pubis ramus fracture. The patient had a fall at her nursing facility. X-rays this morning showed that she had a fracture in the left superior pubic ramus. She has low back pain, but no anterior pain. She denies any head injury. She can normally walk with a walker or a cane. She states that she mostly has a normal appetite, and has normal bowel movements and urination.    Review of Systems   Constitutional: Negative for appetite change.   Musculoskeletal: Positive for back pain.   Neurological:        No head injury   All other systems reviewed and are negative.        Allergies:  Atorvastatin  Macrobid   Nitrofurantoin  Penicillins  Seasonal Allergies  Sulfa Drugs  Sulfasalazine      Medications:    amlodipine   Calcium Carbonate   carvedilol   Cholecalciferol  clonidine   diclofenac   gabapentin   Hydromorphone   hydroxyzine   isosorbide dinitrate  Lidocaine  lisinopril   melatonin 1 MG   menthol-zinc oxide  omeprazole  polyethylene glycol   potassium chloride ER  Senna-docusate   sertraline   Cyclopentolate  prednisolone        Past Medical History:    Anemia  Anxiety  Arthritis  Branch retinal vein occlusion  Depression  Clostridium difficile colitis  Hypertension  Kidney stone  Lumbar compression fracture  Macular degeneration  Mild cognitive impairment  UTI  Upper GI bleed  Adnexal cyst  Sepsis  Generalized weakness  Fall  Hypokalemia  NSTEMI  Sciatica  LISSA  osteoporosis  Hip fracture    Past Surgical History:    Ureter stent insertion  Cystoscopy  EGD  Extracorporeal shock wave lithotripsy  Open reduction internal fixation tibial plateau  Tonsillectomy  Adenoidectomy  Hip replacement      Family History:    Mother: alzheimer disease  Father: cardiovascular  Brother: neurological disorder    Social  History:  Patient presents to the ED alone  Patient lives in a nursing facility    Physical Exam     Patient Vitals for the past 24 hrs:   BP Temp Temp src Pulse Resp SpO2   10/06/21 1900 92/46 -- -- 71 -- 98 %   10/06/21 1845 94/63 -- -- 72 -- 97 %   10/06/21 1830 92/43 -- -- 72 -- 96 %   10/06/21 1745 92/51 -- -- 69 -- 98 %   10/06/21 1740 -- -- -- -- -- 98 %   10/06/21 1730 93/54 -- -- 67 -- 99 %   10/06/21 1720 90/50 -- -- 69 -- 98 %   10/06/21 1715 -- -- -- 68 -- 98 %   10/06/21 1710 -- -- -- -- -- 98 %   10/06/21 1700 (!) 88/46 -- -- 70 -- 97 %   10/06/21 1650 90/51 -- -- -- -- 97 %   10/06/21 1640 -- -- -- -- -- 97 %   10/06/21 1633 94/54 99.4  F (37.4  C) Oral 72 16 97 %   10/06/21 1630 94/54 -- -- -- -- --       Physical Exam    HENT: Atraumatic, normocephalic mmm  Eyes: periorbital tissue and sclera normal  Neck: supple  CV: ppi, regular   Resp: speaking in full sentences without any resp distress  Abd: abdomen is soft without significant tenderness, masses, organomegaly or guarding  Ext: Skeletal survey reveals tenderness palpation over the left hemipelvis, some pain with internal/external rotation of the left hip.  Remainder of skeletal survey unremarkable.  No tenderness in the midline T or L-spine.  Skin: warm dry well perfused  Neuro: Alert, radial nerves II to XII intact and symmetric,  strength 5 out of 5 bilaterally, able to do plantar flexion dorsiflexion symmetric motor in the lower extremities.        Emergency Department Course     Imaging:  XR Lumbar Spine 2/3 Views  Final Result  IMPRESSION: Diffuse osseous demineralization significant caliber is evaluation for a subtle fracture. If there is clinical concern, recommend further characterization with CT.    No significant change in chronic compression deformities of the T11-L3 vertebral bodies. Remaining vertebral body heights are maintained. Moderate to advanced degenerative disc disease at L4-L5 and L5-S1. Lower lumbar facet arthropathy.  No acute   extraspinal abnormality.    XR Pelvis 1/2 Views  Final Result  IMPRESSION:  1. Fracture of the left superior pubic ramus with mild displacement.  2. Fracture of the left inferior pubic ramus.  3. Fracture deformity of the right pubis medially which may well be old.          Laboratory:   CBC: WBC 13 H, HGB 7.8 L,  L  BMP: Urea nitrogen 39 H, Glucose 116 H,  o/w WNL (Creatinine 1.34 H)  Asymptomatic COVID-19 PCR test: pending      Emergency Department Course:    Reviewed:  I reviewed nursing notes, vitals, past history and care everywhere    Assessments:   I obtained history and examined the patient as noted above.    I rechecked the patient and explained findings.       Consults:    I spoke with Nicole blanton for Dr. Mckeon of the Hospitalist service to discuss the patient's findings, presentation, and plan of care.      Interventions:   Tylenol 1000 mg PO    mL IV    Disposition:  The patient was admitted to the hospital under the care of Dr. Mckeon.    Impression & Plan          Medical Decision Makin-year-old female with reported accidental fall yesterday at her memory care unit and a radiograph this morning showing left pubic ramus fracture.  Reportedly at her baseline I see no trauma to the head.  We will repeat images of the pelvis and lumbar spine.  Assess ability to safely ambulate otherwise will need observation admission.  Noted on arrival via EMS to need supplemental oxygen likely secondary to analgesics prior to arrival    Update: Patient has significant increased pain with attempted mobilization.  Repeat radiographs showing superior and inferior pubic rami fractures.  No evidence of acute lumbar compression fracture.  Will admit for mobilization pain control.  Given her age and fragility will need to be done carefully as we overmedication can quite easily cause sedation and/hypoxia.        Covid-19  Daya Ignacio was evaluated during a global COVID-19  pandemic, which necessitated consideration that the patient might be at risk for infection with the SARS-CoV-2 virus that causes COVID-19.   Applicable protocols for evaluation were followed during the patient's care.   COVID-19 was considered as part of the patient's evaluation. The plan for testing is:  a test was obtained during this visit. The test result is pending.  Diagnosis:    ICD-10-CM    1. Fall, initial encounter  W19.XXXA    2. Closed fracture of multiple pubic rami, left, initial encounter (H)  S32.592A            Scribe Disclosure:  Kamini LYNNed, am serving as a scribe at 4:44 PM on 10/6/2021 to document services personally performed by Vince Sepulveda MD based on my observations and the provider's statements to me.      Vince Sepulveda MD  10/06/21 [id number]

## 2021-10-06 NOTE — ED NOTES
Care Management Initial Consult    General Information  Assessment completed with: Patient, Patient  Type of CM/SW Visit: Initial Assessment    Primary Care Provider verified and updated as needed: No   Readmission within the last 30 days: no previous admission in last 30 days      Reason for Consult: discharge planning  Advance Care Planning: Advance Care Planning Reviewed: other (comment) (Brought with to ED )  Will scan to [employer]       Communication Assessment  Patient's communication style: spoken language (English or Bilingual)             Cognitive  Cognitive/Neuro/Behavioral:                        Living Environment:   People in home: alone     Current living Arrangements:        Able to return to prior arrangements:         Family/Social Support:  Care provided by:    Provides care for:       Children          Description of Support System:           Current Resources:   Patient receiving home care services:       Community Resources:    Equipment currently used at home:    Supplies currently used at home:      Employment/Financial:  Employment Status:          Financial Concerns:     Referral to Financial Counselor: No       Lifestyle & Psychosocial Needs:  Social Determinants of Health     Tobacco Use: Medium Risk     Smoking Tobacco Use: Former Smoker     Smokeless Tobacco Use: Never Used   Alcohol Use:      Frequency of Alcohol Consumption:      Average Number of Drinks:      Frequency of Binge Drinking:    Financial Resource Strain:      Difficulty of Paying Living Expenses:    Food Insecurity:      Worried About Running Out of Food in the Last Year:      Ran Out of Food in the Last Year:    Transportation Needs:      Lack of Transportation (Medical):      Lack of Transportation (Non-Medical):    Physical Activity:      Days of Exercise per Week:      Minutes of Exercise per Session:    Stress:      Feeling of Stress :    Social Connections:      Frequency of Communication with Friends and  "Family:      Frequency of Social Gatherings with Friends and Family:      Attends [employer] Services:      Active Member of Clubs or Organizations:      Attends Club or Organization Meetings:      Marital Status:    Intimate Partner Violence:      Fear of Current or Ex-Partner:      Emotionally Abused:      Physically Abused:      Sexually Abused:    Depression:      PHQ-2 Score:    Housing Stability:      Unable to Pay for Housing in the Last Year:      Number of Places Lived in the Last Year:      Unstable Housing in the Last Year:        Functional Status:  Prior to admission patient needed assistance:              Mental Health Status:          Chemical Dependency Status:                Values/Beliefs:  Spiritual, Cultural Beliefs, Ep[employer] Practices, Values that affect care:                 Additional Information:  Patient presents to ED alone from [facility] ([phone]) after a fall.     Per chart review[facility] When asked if the patient has since returned home, she stated \"I hope so.\" Patient seems to have some level of confusion and was unable to answer some of the social workers questions.     Patient was on oxygen in ED but says she does not use it at home.    Patient gave verbal permission for  to speak with her family.     Patient brought POLST with her that was scanned and emailed to Chinedu Garnett.     PRISCILA Maria, LGSW  , ED Care Management   [phone]    "

## 2021-10-06 NOTE — ED TRIAGE NOTES
Presents from SNF after fall yesterday. Pt c.o back pain. Xray this AM show fracture of the left superior pubic ramus. 2 mg dilaudid and 4mg Zofran administered PTA. Pt now requiring O2 to maintain SpO2 > 90%. Confusion at baseline. VSS on 2 lpm O2.

## 2021-10-06 NOTE — ED NOTES
Bed: ED08  Expected date:   Expected time:   Means of arrival:   Comments:  Anju [id number] 89 y F pelvic fx

## 2021-10-07 ENCOUNTER — DOCUMENTATION ONLY (OUTPATIENT)
Dept: OTHER | Facility: CLINIC | Age: 86
End: 2021-10-07

## 2021-10-07 ENCOUNTER — APPOINTMENT (OUTPATIENT)
Dept: PHYSICAL THERAPY | Facility: CLINIC | Age: 86
DRG: 536 | End: 2021-10-07
Attending: PHYSICIAN ASSISTANT
Payer: COMMERCIAL

## 2021-10-07 LAB
ALBUMIN UR-MCNC: 20 MG/DL
ANION GAP SERPL CALCULATED.3IONS-SCNC: 5 MMOL/L (ref 3–14)
APPEARANCE UR: ABNORMAL
BACTERIA #/AREA URNS HPF: ABNORMAL /HPF
BASOPHILS # BLD AUTO: 0 10E3/UL (ref 0–0.2)
BASOPHILS NFR BLD AUTO: 0 %
BILIRUB UR QL STRIP: NEGATIVE
BUN SERPL-MCNC: 40 MG/DL (ref 7–30)
CALCIUM SERPL-MCNC: 8.7 MG/DL (ref 8.5–10.1)
CAOX CRY #/AREA URNS HPF: ABNORMAL /HPF
CHLORIDE BLD-SCNC: 106 MMOL/L (ref 94–109)
CO2 SERPL-SCNC: 26 MMOL/L (ref 20–32)
COLOR UR AUTO: YELLOW
CREAT SERPL-MCNC: 1.11 MG/DL (ref 0.52–1.04)
EOSINOPHIL # BLD AUTO: 0.2 10E3/UL (ref 0–0.7)
EOSINOPHIL NFR BLD AUTO: 2 %
ERYTHROCYTE [DISTWIDTH] IN BLOOD BY AUTOMATED COUNT: 16.8 % (ref 10–15)
GFR SERPL CREATININE-BSD FRML MDRD: 44 ML/MIN/1.73M2
GLUCOSE BLD-MCNC: 98 MG/DL (ref 70–99)
GLUCOSE UR STRIP-MCNC: NEGATIVE MG/DL
HCT VFR BLD AUTO: 22.1 % (ref 35–47)
HGB BLD-MCNC: 7.1 G/DL (ref 11.7–15.7)
HGB UR QL STRIP: NEGATIVE
HYALINE CASTS: 1 /LPF
IMM GRANULOCYTES # BLD: 0.1 10E3/UL
IMM GRANULOCYTES NFR BLD: 1 %
KETONES UR STRIP-MCNC: NEGATIVE MG/DL
LEUKOCYTE ESTERASE UR QL STRIP: ABNORMAL
LYMPHOCYTES # BLD AUTO: 0.8 10E3/UL (ref 0.8–5.3)
LYMPHOCYTES NFR BLD AUTO: 8 %
MCH RBC QN AUTO: 34.5 PG (ref 26.5–33)
MCHC RBC AUTO-ENTMCNC: 32.1 G/DL (ref 31.5–36.5)
MCV RBC AUTO: 107 FL (ref 78–100)
MONOCYTES # BLD AUTO: 0.6 10E3/UL (ref 0–1.3)
MONOCYTES NFR BLD AUTO: 5 %
NEUTROPHILS # BLD AUTO: 8.9 10E3/UL (ref 1.6–8.3)
NEUTROPHILS NFR BLD AUTO: 84 %
NITRATE UR QL: NEGATIVE
NRBC # BLD AUTO: 0 10E3/UL
NRBC BLD AUTO-RTO: 0 /100
PH UR STRIP: 5 [PH] (ref 5–7)
PLATELET # BLD AUTO: 106 10E3/UL (ref 150–450)
POTASSIUM BLD-SCNC: 4.4 MMOL/L (ref 3.4–5.3)
RBC # BLD AUTO: 2.06 10E6/UL (ref 3.8–5.2)
RBC URINE: 4 /HPF
SARS-COV-2 RNA RESP QL NAA+PROBE: NEGATIVE
SODIUM SERPL-SCNC: 137 MMOL/L (ref 133–144)
SP GR UR STRIP: 1.03 (ref 1–1.03)
SQUAMOUS EPITHELIAL: 1 /HPF
TRANSITIONAL EPI: 1 /HPF
UROBILINOGEN UR STRIP-MCNC: NORMAL MG/DL
WBC # BLD AUTO: 10.6 10E3/UL (ref 4–11)
WBC URINE: 27 /HPF

## 2021-10-07 PROCEDURE — 999N000111 HC STATISTIC OT IP EVAL DEFER

## 2021-10-07 PROCEDURE — 258N000003 HC RX IP 258 OP 636: Performed by: HOSPITALIST

## 2021-10-07 PROCEDURE — 250N000011 HC RX IP 250 OP 636: Performed by: PHYSICIAN ASSISTANT

## 2021-10-07 PROCEDURE — 120N000004 HC R&B MS OVERFLOW

## 2021-10-07 PROCEDURE — 250N000013 HC RX MED GY IP 250 OP 250 PS 637: Performed by: PHYSICIAN ASSISTANT

## 2021-10-07 PROCEDURE — 97161 PT EVAL LOW COMPLEX 20 MIN: CPT | Mod: GP

## 2021-10-07 PROCEDURE — 87086 URINE CULTURE/COLONY COUNT: CPT | Performed by: PHYSICIAN ASSISTANT

## 2021-10-07 PROCEDURE — 99233 SBSQ HOSP IP/OBS HIGH 50: CPT | Performed by: HOSPITALIST

## 2021-10-07 PROCEDURE — 81001 URINALYSIS AUTO W/SCOPE: CPT | Performed by: PHYSICIAN ASSISTANT

## 2021-10-07 PROCEDURE — 85025 COMPLETE CBC W/AUTO DIFF WBC: CPT | Performed by: PHYSICIAN ASSISTANT

## 2021-10-07 PROCEDURE — 36415 COLL VENOUS BLD VENIPUNCTURE: CPT | Performed by: PHYSICIAN ASSISTANT

## 2021-10-07 PROCEDURE — 97530 THERAPEUTIC ACTIVITIES: CPT | Mod: GP

## 2021-10-07 PROCEDURE — 80048 BASIC METABOLIC PNL TOTAL CA: CPT | Performed by: PHYSICIAN ASSISTANT

## 2021-10-07 RX ORDER — SODIUM CHLORIDE 9 MG/ML
INJECTION, SOLUTION INTRAVENOUS CONTINUOUS
Status: DISCONTINUED | OUTPATIENT
Start: 2021-10-07 | End: 2021-10-08

## 2021-10-07 RX ADMIN — HYDROMORPHONE HYDROCHLORIDE 0.2 MG: 0.2 INJECTION, SOLUTION INTRAMUSCULAR; INTRAVENOUS; SUBCUTANEOUS at 04:58

## 2021-10-07 RX ADMIN — DICLOFENAC SODIUM 4 G: 10 GEL TOPICAL at 10:46

## 2021-10-07 RX ADMIN — POTASSIUM CHLORIDE 10 MEQ: 750 TABLET, FILM COATED, EXTENDED RELEASE ORAL at 09:05

## 2021-10-07 RX ADMIN — CARVEDILOL 12.5 MG: 12.5 TABLET, FILM COATED ORAL at 09:05

## 2021-10-07 RX ADMIN — GABAPENTIN 300 MG: 300 CAPSULE ORAL at 09:03

## 2021-10-07 RX ADMIN — ACETAMINOPHEN 1000 MG: 500 TABLET, FILM COATED ORAL at 09:02

## 2021-10-07 RX ADMIN — DICLOFENAC SODIUM 4 G: 10 GEL TOPICAL at 13:34

## 2021-10-07 RX ADMIN — OMEPRAZOLE 20 MG: 20 CAPSULE, DELAYED RELEASE ORAL at 09:03

## 2021-10-07 RX ADMIN — Medication 50 MCG: at 09:03

## 2021-10-07 RX ADMIN — HYDROMORPHONE HYDROCHLORIDE 2 MG: 2 TABLET ORAL at 19:16

## 2021-10-07 RX ADMIN — ISOSORBIDE DINITRATE 10 MG: 10 TABLET ORAL at 09:03

## 2021-10-07 RX ADMIN — GABAPENTIN 300 MG: 300 CAPSULE ORAL at 19:17

## 2021-10-07 RX ADMIN — ACETAMINOPHEN 1000 MG: 500 TABLET, FILM COATED ORAL at 13:33

## 2021-10-07 RX ADMIN — HYDROXYZINE HYDROCHLORIDE 25 MG: 25 TABLET ORAL at 19:17

## 2021-10-07 RX ADMIN — ACETAMINOPHEN 1000 MG: 500 TABLET, FILM COATED ORAL at 19:16

## 2021-10-07 RX ADMIN — SENNOSIDES AND DOCUSATE SODIUM 1 TABLET: 50; 8.6 TABLET ORAL at 09:03

## 2021-10-07 RX ADMIN — HYDROXYZINE HYDROCHLORIDE 25 MG: 25 TABLET ORAL at 05:24

## 2021-10-07 RX ADMIN — OXYCODONE HYDROCHLORIDE AND ACETAMINOPHEN 500 MG: 500 TABLET ORAL at 09:03

## 2021-10-07 RX ADMIN — ONDANSETRON 4 MG: 2 INJECTION INTRAMUSCULAR; INTRAVENOUS at 10:42

## 2021-10-07 RX ADMIN — LIDOCAINE 1 PATCH: 246 PATCH TOPICAL at 21:54

## 2021-10-07 RX ADMIN — SERTRALINE HYDROCHLORIDE 100 MG: 100 TABLET ORAL at 09:03

## 2021-10-07 RX ADMIN — SODIUM CHLORIDE: 9 INJECTION, SOLUTION INTRAVENOUS at 11:13

## 2021-10-07 RX ADMIN — HYDROMORPHONE HYDROCHLORIDE 2 MG: 2 TABLET ORAL at 23:11

## 2021-10-07 RX ADMIN — DICLOFENAC SODIUM 4 G: 10 GEL TOPICAL at 17:11

## 2021-10-07 NOTE — H&P
[facility]    History and Physical - Hospitalist Service       Date of Admission:  10/6/2021    Assessment & Plan      Daya Ignacio is a 89 year old female with known multilevel compression fractures, ESBL UTIs, C diff, HTN, cognitive impairment, HTN, HLP and anxiety/depression, who was admitted on 10/6/2021 after a fall and found to have left superior and inferior pubic rami fractures with mild displacement of the superior pubic ramus fx.  In the ED, SpO2 dipped after PTA narcotic use, requiring 2L supplemental O2. BPs low normal. VS otherwise stable. BMP shows mild AYAKA with Cr of 1.34, BUN 39. CBC is significant for WBC of 13.0, Hgb 7.8 and platelets of 127. CXR is clear. XR of the pelvis and lumbar spine shows left superior and inferior pubic rami fractures with mild displacement of the superior pubic rami, poss old R pubic ramus fx as well. No definitive fractures noted on lumbar XR. Pt was given 1000 mg PO Tylenol and 500 mL NS bolus. She also received 4 puffs of albuterol inhaler.     1. L superior and inferior pubic rami fx - due to fall, sounds like witnessed, pt states she slipped on a grease spot. Hx of multiple compression fx and chronic pain. Hypoxic in ED, likely due to narcotic use, making pain control more difficult.  -admit to inpatient, anticipate >2 days for pain control, likely need TCU  -pain control with bisi Tylenol, resume home PRN PO dilaudid, PRN hydroxyzine, voltaren gel and lidocaine patches. IV dilaudid available for breakthrough pain.  -resume home gabapentin   -avoid NSAIDs now due to AYAKA  -PT consult  -consider Ortho consult for formal recs  -SW consult    2. AYAKA - unclear etiology, she reports good po intake recently, unclear if she has received any NSAIDs. Given leukocytosis and fall, will add on UA  3. Leukocytosis - may be reactive due to fall and fx. Given AYAKA, will get UA    4. HTN - resume home amlodipine, Coreg, clonidine and lisinopril with  parameters  5. GERD - resume pta PPI  6. Depression / anxiety - resume pta zoloft     Diet:   regular diet  DVT Prophylaxis: Pneumatic Compression Devices  Almanza Catheter: Not present  Central Lines: None  Code Status:   DNR/DNI    Clinically Significant Risk Factors Present on Admission              # Thrombocytopenia: Plts = 127 10e3/uL (Ref range: 150 - 450 10e3/uL) on admission, will monitor for bleeding      Disposition Plan   Expected discharge: 2-3 days recommended to facility vs TCU once adequate pain management/ tolerating PO medications.     The patient's care was discussed with the Patient and ED provider, Dr. Sepulveda.    Nicole Hernandez PA-C  [facility]  Securely message with the [employer] Web Console (learn more here)  Text page via [facility] Paging/Directory      ______________________________________________________________________    Chief Complaint   Fall, pelvic pain    History is obtained from the patient    History of Present Illness   Daya Ignacio is a 89 year old female with known multilevel compression fractures, ESBL UTIs, C diff, HTN, cognitive impairment, HTN, HLP and anxiety/depression, who presents from her memory care facility after a fall.  Sounds like she fell yesterday and had x-rays this morning that showed a left superior pubic rami fracture.  She has known chronic back pain related to multiple lumbar compression fractures.  At baseline, she can ambulate with a walker or cane.  She denies recent fever, chills, cough, chest pain, shortness of breath, abdominal pain, nausea, vomiting, diarrhea or dysuria.    Review of Systems    The 10 point Review of Systems is negative other than noted in the HPI or here.     Past Medical History    I have reviewed this patient's medical history and updated it with pertinent information if needed.   Past Medical History:   Diagnosis Date     Anemia      Anxiety      Arthritis      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  4/16/2014     Depression      h/o Clostridium difficile colitis 06/2019     History of blood transfusion      Hypertension      Kidney stone      Lumbar compression fracture (H)      Macular degeneration (senile) of retina, unspecified      Mild cognitive impairment      Mumps      Other chronic pain     low back     Recurrent UTI      Upper GI bleed 06/2019     Urinary tract infection due to extended-spectrum beta lactamase (ESBL)-producing Klebsiella 05/2019    resistant to Bactrim       Past Surgical History   I have reviewed this patient's surgical history and updated it with pertinent information if needed.  Past Surgical History:   Procedure Laterality Date     Cataract removal NOS Bilateral      COMBINED CYSTOSCOPY, INSERT STENT URETER(S) Left 5/6/2020    Procedure: Cystoscopy with left ureteral stent insertion;  Surgeon: Gaurav Munson MD;  Location:  OR     CYSTOSCOPY N/A 8/1/2019    Procedure: Exam under anesthesia, video cystopanendoscopy;  Surgeon: Dennis Carlson MD;  Location:  OR     ESOPHAGOSCOPY, GASTROSCOPY, DUODENOSCOPY (EGD), COMBINED N/A 6/11/2019    Procedure: ESOPHAGOGASTRODUODENOSCOPY (EGD);  Surgeon: Gaurav Degroot MD;  Location:  GI     EXTRACORPOREAL SHOCK WAVE LITHOTRIPSY (ESWL) Left 5/28/2020    Procedure: LEFT EXTRACORPOREAL SHOCK WAVE LITHOTRIPSY;  Surgeon: Gaurav Munson MD;  Location:  OR     Hip Pinning procedure Left      Hip replacement NOS Right      LASER HOLMIUM LITHOTRIPSY URETER(S), INSERT STENT, COMBINED Left 10/30/2020    Procedure: Cystoscopy, left ureteral stent exchange, left retrograde pyelogram, interpretation of fluoroscopic images, left ureteroscopy with holmium lithotripsy and stone basketing;  Surgeon: Gaurav Munson MD;  Location:  OR     OPEN REDUCTION INTERNAL FIXATION TIBIAL PLATEAU Right 8/28/2019    Procedure: Open reduction internal fixation right proximal tibia fracture;  Surgeon: Molina Pardo MD;   Location: [facility] OR     TONSILLECTOMY, ADENOIDECTOMY, COMBINED         Social History   I have reviewed this patient's social history and updated it with pertinent information if needed.  Social History     Tobacco Use     Smoking status: Former Smoker     Types: Cigarettes     Smokeless tobacco: Never Used   Substance Use Topics     Alcohol use: No     Drug use: No       Family History   I have reviewed this patient's family history and updated it with pertinent information if needed.  Family History   Problem Relation Age of Onset     Alzheimer Disease Mother         mild     Cardiovascular Father         heart attack     Neurologic Disorder Brother 83        Parkinson's       Prior to Admission Medications   Prior to Admission Medications   Prescriptions Last Dose Informant Patient Reported? Taking?   Calcium Carbonate (CALCIUM 600 PO)   Yes No   Sig: Take 1 tablet by mouth 2 times daily    Cholecalciferol (VITAMIN D3) 2000 UNITS CAPS   Yes No   Sig: Take 2,000 Units by mouth daily (with dinner)   HYDROmorphone (DILAUDID) 2 MG tablet   No No   Sig: Take 1 tablet (2 mg) by mouth every 4 hours as needed for moderate to severe pain   Lidocaine (LIDOCARE) 4 % Patch   No No   Sig: Place 2 patches onto the skin every 24 hours To prevent lidocaine toxicity, patient should be patch free for 12 hrs daily. Apply over spine.   Multiple Vitamins-Minerals (PRESERVISION AREDS) CAPS   Yes No   Sig: Take 1 capsule by mouth 2 times daily    SENNA-docusate sodium (SENNA S) 8.6-50 MG tablet   No No   Sig: Take 1 tablet by mouth At Bedtime   acetaminophen (TYLENOL) 500 MG tablet   Yes No   Sig: Take 1,000 mg by mouth 3 times daily    acetaminophen (TYLENOL) 500 MG tablet   No No   Sig: Take 2 tablets (1,000 mg) by mouth daily as needed for mild pain   amLODIPine (NORVASC) 10 MG tablet   Yes No   Sig: Take 10 mg by mouth daily   carvedilol (COREG) 25 MG tablet   Yes No   Sig: Take 12.5 mg by mouth every morning    carvedilol (COREG) 25  MG tablet   Yes No   Sig: Take 25 mg by mouth At Bedtime   cloNIDine (CATAPRES) 0.1 MG tablet   Yes No   Sig: Take 0.05 mg by mouth 2 times daily    diclofenac (VOLTAREN) 1 % topical gel   Yes No   Sig: Place 2 g onto the skin 3 times daily as needed for moderate pain Apply to right knee   gabapentin (NEURONTIN) 100 MG capsule   No No   Sig: Take 3 capsules (300 mg) by mouth 2 times daily   hydrOXYzine (ATARAX) 25 MG tablet   No No   Sig: Take 1 tablet (25 mg) by mouth every 8 hours as needed for itching   isosorbide dinitrate (ISORDIL) 10 MG tablet   No No   Sig: Take 1 tablet (10 mg) by mouth 3 times daily   lisinopril (ZESTRIL) 30 MG tablet   Yes No   Sig: Take 30 mg by mouth daily   melatonin 1 MG TABS tablet   No No   Sig: Take 1 tablet (1 mg) by mouth nightly as needed for sleep   menthol-zinc oxide (CALMOSEPTINE) 0.44-20.6 % OINT ointment   Yes No   Sig: Apply topically 2 times daily as needed for skin protection Apply to abrasion on left lower back until healed   omeprazole (PRILOSEC) 20 MG DR capsule   Yes No   Sig: Take 20 mg by mouth daily    polyethylene glycol (MIRALAX/GLYCOLAX) packet   No No   Sig: Take 17 g by mouth daily as needed for constipation   potassium chloride ER (KLOR-CON M) 10 MEQ CR tablet   Yes No   Sig: Take 10 mEq by mouth daily   sertraline (ZOLOFT) 100 MG tablet   Yes No   Sig: Take 100 mg by mouth daily   vitamin C (ASCORBIC ACID) 500 MG tablet   Yes No   Sig: Take 500 mg by mouth daily at 4pm       Facility-Administered Medications: None     Allergies   Allergies   Allergen Reactions     Atorvastatin Muscle Pain (Myalgia)     Macrobid [Nitrofurantoin Anhydrous] Other (See Comments)     Body aches     Nitrofurantoin Muscle Pain (Myalgia) and Unknown     Body aches       Penicillins Unknown     Seasonal Allergies Unknown     Sulfa Drugs Other (See Comments)     Tolerated Bactrim May 2019  Entire family has allergy to Sulfa     Sulfasalazine Unknown       Physical Exam   Vital Signs:  Temp: 99.4  F (37.4  C) Temp src: Oral BP: 113/59 Pulse: 72   Resp: 16 SpO2: 97 % O2 Device: Nasal cannula Oxygen Delivery: 2 LPM  Weight: 0 lbs 0 oz    GENERAL:  Comfortable.  PSYCH: pleasant, No acute distress.  HEENT:  Atraumatic, normocephalic. Normal conjunctiva, normal hearing, and oropharynx is normal.  NECK:  Supple, no neck vein distention  HEART:  Normal S1, S2 with no murmur, no pericardial rub, gallops or S3 or S4.  LUNGS:  Clear to auscultation, normal Respiratory effort. No wheezing, rales or ronchi.  GI:  Soft, normal bowel sounds. Non-tender, non distended.   EXTREMITIES:  No pedal edema, +2 pulses bilateral and equal.  SKIN:  Dry to touch, No rash, wound or ulcerations.  NEUROLOGIC:  CN 2-12 intact, BL 5/5 symmetric upper and lower extremity strength, sensation is intact with no focal deficits.      Data   Data reviewed today: I reviewed all medications, new labs and imaging results over the last 24 hours. I personally reviewed the chest x-ray image(s) showing nothing acute and the XRs of the pelvis and lumbar spine image(s) showing left sup and inf pubic rami fx.    Most Recent 3 CBC's:Recent Labs   Lab Test 10/06/21  2003 08/16/21  1157 08/16/21  0549 05/28/21  0542 05/28/21  0542   WBC 13.0* 9.5  --   --  6.2   HGB 7.8* 12.8  --   --  9.0*   * 95  --   --  96   * 227 215   < > 199    < > = values in this interval not displayed.     Most Recent 3 BMP's:Recent Labs   Lab Test 10/06/21  2003 08/26/21  0528 08/17/21  0726 08/16/21  1157 08/16/21  1157    133*  --   --  133   POTASSIUM 4.9 4.0  --   --  3.8   CHLORIDE 103 100  --   --  101   CO2 25 24  --   --  28   BUN 39* 17  --   --  27   CR 1.34* 0.67 0.93   < > 0.68   ANIONGAP 6 9  --   --  4   DAVE 9.2 9.9  --   --  9.8   * 81  --   --  126*    < > = values in this interval not displayed.     Recent Results (from the past 24 hour(s))   XR Pelvis 1/2 Views    Narrative    EXAM: XR PELVIS 1/2 VW  LOCATION: [facility]  DATE/TIME: 10/6/2021 5:58 PM    INDICATION: Fall, pain.    COMPARISON: None.    FINDINGS: Osteopenia. Right hip arthroplasty. Plate-screw fixation of the left proximal femur.      Impression    IMPRESSION:  1. Fracture of the left superior pubic ramus with mild displacement.  2. Fracture of the left inferior pubic ramus.  3. Fracture deformity of the right pubis medially which may well be old.     XR Lumbar Spine 2/3 Views    Narrative    EXAM: XR LUMBAR SPINE 2-3 VIEWS  LOCATION: [facility]  DATE/TIME: 10/6/2021 5:58 PM    INDICATION: fall, pain  COMPARISON: 08/16/2021  TECHNIQUE: CR Lumbar Spine.      Impression    IMPRESSION: Diffuse osseous demineralization significant caliber is evaluation for a subtle fracture. If there is clinical concern, recommend further characterization with CT.    No significant change in chronic compression deformities of the T11-L3 vertebral bodies. Remaining vertebral body heights are maintained. Moderate to advanced degenerative disc disease at L4-L5 and L5-S1. Lower lumbar facet arthropathy. No acute   extraspinal abnormality.   XR Chest Port 1 View    Narrative    EXAM: XR CHEST PORT 1 VIEW  LOCATION: [facility]  DATE/TIME: 10/6/2021 8:22 PM    INDICATION: hypoxia  COMPARISON: 10/31/2020      Impression    IMPRESSION: No focal infiltrate, pleural effusion or pneumothorax. The cardiac and mediastinal silhouettes are normal.

## 2021-10-07 NOTE — PHARMACY-ADMISSION MEDICATION HISTORY
Admission medication history interview status for this patient is complete. See [facility] admission navigator for allergy information, prior to admission medications and immunization status.     Medication history interview source(s):[facility] med list  Medication history resources (including written lists, pill bottles, clinic record):EPIC list, [employer], [facility] med list ([phone]  Primary pharmacy:[facility]    Changes made to PTA medication list:  Added: scheduled dilaudid  Deleted: -----  Changed: vit d from daily with dinner to daily, clonidine from 0.5 mg bid to 0.1mg at bedtime, calmoseptine from bid prn to bid, senna-docusate to am, vit c to am    Actions taken by pharmacist (provider contacted, etc):None     Additional medication history information:None    Medication reconciliation/reorder completed by provider prior to medication history? No      For patients on insulin therapy:N    Prior to Admission medications    Medication Sig Last Dose Taking? Auth Provider   acetaminophen (TYLENOL) 500 MG tablet Take 2 tablets (1,000 mg) by mouth daily as needed for mild pain  Yes Nicole Hernandez PA-C   acetaminophen (TYLENOL) 500 MG tablet Take 1,000 mg by mouth 3 times daily  10/5/2021 at Unknown time Yes Unknown, Entered By History   amLODIPine (NORVASC) 10 MG tablet Take 10 mg by mouth daily 10/5/2021 at Unknown time Yes Unknown, Entered By History   Calcium Carbonate (CALCIUM 600 PO) Take 1 tablet by mouth 2 times daily  10/5/2021 at Unknown time Yes Reported, Patient   carvedilol (COREG) 25 MG tablet Take 25 mg by mouth At Bedtime 10/5/2021 at Unknown time Yes Unknown, Entered By History   carvedilol (COREG) 25 MG tablet Take 12.5 mg by mouth every morning  10/5/2021 at Unknown time Yes Reported, Patient   Cholecalciferol (VITAMIN D3) 2000 UNITS CAPS Take 2,000 Units by mouth daily  10/5/2021 at Unknown time Yes Unknown, Entered By History   cloNIDine (CATAPRES) 0.1 MG tablet Take 0.1 mg by  mouth At Bedtime  10/5/2021 at Unknown time Yes Unknown, Entered By History   diclofenac (VOLTAREN) 1 % topical gel Place 2 g onto the skin 3 times daily as needed for moderate pain Apply to right knee  Yes Unknown, Entered By History   gabapentin (NEURONTIN) 100 MG capsule Take 3 capsules (300 mg) by mouth 2 times daily 10/5/2021 at Unknown time Yes Nicole Hernandez PA-C   HYDROmorphone (DILAUDID) 2 MG tablet Take 2 mg by mouth 2 times daily 10/5/2021 at Unknown time Yes Unknown, Entered By History   HYDROmorphone (DILAUDID) 2 MG tablet Take 1 tablet (2 mg) by mouth every 4 hours as needed for moderate to severe pain  Yes Nicole Hernandez PA-C   hydrOXYzine (ATARAX) 25 MG tablet Take 1 tablet (25 mg) by mouth every 8 hours as needed for itching 10/6/2021 at 1300 Yes Nicole Hernandez PA-C   isosorbide dinitrate (ISORDIL) 10 MG tablet Take 1 tablet (10 mg) by mouth 3 times daily 10/5/2021 at Unknown time Yes Marcy Soares PA-C   Lidocaine (LIDOCARE) 4 % Patch Place 2 patches onto the skin every 24 hours To prevent lidocaine toxicity, patient should be patch free for 12 hrs daily. Apply over spine. 10/5/2021 at Unknown time Yes Andre Mcmanus MD   lisinopril (ZESTRIL) 30 MG tablet Take 30 mg by mouth daily 10/5/2021 at Unknown time Yes Unknown, Entered By History   melatonin 1 MG TABS tablet Take 1 tablet (1 mg) by mouth nightly as needed for sleep  Yes Andre Mcmanus MD   menthol-zinc oxide (CALMOSEPTINE) 0.44-20.6 % OINT ointment Apply topically 2 times daily Apply to abrasion on left lower back until healed  10/5/2021 at Unknown time Yes Unknown, Entered By History   Multiple Vitamins-Minerals (PRESERVISION AREDS) CAPS Take 1 capsule by mouth 2 times daily  10/5/2021 at Unknown time Yes Unknown, Entered By History   omeprazole (PRILOSEC) 20 MG DR capsule Take 20 mg by mouth daily  10/5/2021 at Unknown time Yes Reported, Patient   polyethylene glycol (MIRALAX/GLYCOLAX) packet Take 17 g by  mouth daily as needed for constipation  Yes Gisela Metha PA-C   potassium chloride ER (KLOR-CON M) 10 MEQ CR tablet Take 10 mEq by mouth daily 10/5/2021 at Unknown time Yes Unknown, Entered By History   SENNA-docusate sodium (SENNA S) 8.6-50 MG tablet Take 1 tablet by mouth At Bedtime  Patient taking differently: Take 1 tablet by mouth daily  10/5/2021 at Unknown time Yes Seema Quintero APRN CNP   sertraline (ZOLOFT) 100 MG tablet Take 100 mg by mouth daily 10/5/2021 at Unknown time Yes Unknown, Entered By History   vitamin C (ASCORBIC ACID) 500 MG tablet Take 500 mg by mouth daily  10/5/2021 at Unknown time Yes Reported, Patient

## 2021-10-07 NOTE — PLAN OF CARE
OT: Orders received. Chart reviewed and discussed with care team.  OT not indicated as pt currently requiring increased assist for basic mobility due to pain. PT recommending TCU or return to [facility] w/ Ax2. Defer OT eval to next level of care (TCU or HH at [facility]). IP OT eval not indicated.   Defer discharge recommendations to PT/care team.  Will complete IP OT  orders.

## 2021-10-07 NOTE — PLAN OF CARE
Pt Disorientated to time and situation. Slept much of the day except when turned and repositioned. PT said she was too painful. Pain managed with scheduled tylenol and voltaren cream and Lidocaine patch. Pt does have prn atarax and po Dilaudid available but does not want to turn or move much.  Pt bladder scanned at 1424 for 289 ML. DTV again. Pt has poor appetite she only had 25% of her lunch. LS diminished. 2L NC encouraged IS use. Very poor at the IS. Up with assist of 2 gait belt and walker/lift but has not been OOB. Hypotensive BP 93/40 all BP medications on hold.  Pt has had no BM. PCD's on. Still need to collect enteric/Cdiff. Zofran given for nausea with good relief. Plan is TCU when discharged. Will continue tomonitor.

## 2021-10-07 NOTE — PROGRESS NOTES
[facility]    Medicine Progress Note - Hospitalist Service       Date of Admission:  10/6/2021    Assessment & Plan           Daya Ignacio is a 89 year old female with past medical history of hypertension, chronic anemia, lumbar compression fractures with chronic back pain, multiple hospitalizations UTI (VRE, ESBL), history of C. Difficile, diverticular GI bleed, CAD (STEMI 5/2017), CHF (with preserved EF), depression, neuropathy, nephrolithiasis, UTI with left stent placement/lithotripsy in 2020. Admitted 10/6/2021 after fall/pelvic pain. Found to have L superior/inferior pubic rami fractures. This am with abdominal discomfort and diarrhea.    Mechanical fall with L inf/sup pubic rami fractures    - was ambulating with caregiver at assisted living    - in setting of chronic back pain with previous vertebral/pelvic fracture    - pain control with scheduled Tylenol, lidocaine patches, home PRN oral dilaudid    Nausea, abdo pain, diarrhea    - started this am    - stopped stool softeners    - sent for enteric/C diff    - cont IVF with NS    Acute renal failure    - likely pre-renal    - cont IVF    - hold lisinopril    Acute on chronic anemia    - baseline appears to be about 9-10    - today is 7.1    - no signs of bleeding (stools light brown)    - monitor, transfuse if <7    Thrombocytopenia    - has had in the past    -   Hypotension (in the setting of baseline HTN)    - hold home meds: imdur, lisinopril, amlodipine and clonidine    - cont carvedilol for now    History of ESBL UTI    - UA looks ok    - pending culture    - no antibiotics at this time     History CAD with STEMI in 5/2017    - holding Imdur     - cont carvedilol    - not on asa as outpt     Depression   - cont home sertraline     Chronic pain    - cont PRN dilaudid     GERD    - cont pta PPI     Called and updated son       Diet: Combination Diet Regular Diet Adult    DVT Prophylaxis: Pneumatic Compression Devices (anemia and  thrombocytopenia)  Almanza Catheter: Not present  Central Lines: None  Code Status: No CPR- Do NOT Intubate      Disposition Plan   Expected discharge: 10/09/2021   recommended to transitional care unit once safe disposition plan/ TCU bed available.     The patient's care was discussed with the Bedside Nurse, Care Coordinator/, Patient and Patient's Family.    Juliano Mckeon MD  Hospitalist Service  [facility]  Securely message with the Vocera Web Console (learn more here)  Text page via [employer] Paging/Directory      Clinically Significant Risk Factors Present on Admission              # Thrombocytopenia: Plts = 106 10e3/uL (Ref range: 150 - 450 10e3/uL) on admission, will monitor for bleeding      ______________________________________________________________________    Interval History   Patient currently having loose stool, abdominal discomfort and nausea. Ate a little breakfast. Assisted to commode. Did not have much pelvic or hip pain.    Data reviewed today: I reviewed all medications, new labs and imaging results over the last 24 hours. I personally reviewed no images or EKG's today.    Physical Exam   Vital Signs: Temp: 98.1  F (36.7  C) Temp src: Oral BP: (!) 117/38 Pulse: 70   Resp: 20 SpO2: 98 % O2 Device: Nasal cannula Oxygen Delivery: 2 LPM  Weight: 115 lbs 8 oz  Constitutional: awake, alert, cooperative, no apparent distress, and appears stated age  Eyes: Lids and lashes normal, pupils equal, round and reactive to light, extra ocular muscles intact, sclera clear, conjunctiva normal  ENT: Normocephalic, without obvious abnormality, atraumatic, sinuses nontender on palpation, external ears without lesions, oral pharynx with moist mucous membranes, tonsils without erythema or exudates, gums normal and good dentition.  Respiratory: basilar crackles  Cardiovascular: Normal apical impulse, regular rate and rhythm, normal S1 and S2, no S3 or S4, and no murmur noted  GI: No  scars, normal bowel sounds, soft, non-distended, non-tender, no masses palpated, no hepatosplenomegally  Skin: no bruising or bleeding  Musculoskeletal: no lower extremity pitting edema present    Data   Recent Labs   Lab 10/07/21  0521 10/06/21  2003   WBC 10.6 13.0*   HGB 7.1* 7.8*   * 106*   * 127*    134   POTASSIUM 4.4 4.9   CHLORIDE 106 103   CO2 26 25   BUN 40* 39*   CR 1.11* 1.34*   ANIONGAP 5 6   DAVE 8.7 9.2   GLC 98 116*     Recent Results (from the past 24 hour(s))   XR Pelvis 1/2 Views    Narrative    EXAM: XR PELVIS 1/2 VW  LOCATION: [facility]  DATE/TIME: 10/6/2021 5:58 PM    INDICATION: Fall, pain.    COMPARISON: None.    FINDINGS: Osteopenia. Right hip arthroplasty. Plate-screw fixation of the left proximal femur.      Impression    IMPRESSION:  1. Fracture of the left superior pubic ramus with mild displacement.  2. Fracture of the left inferior pubic ramus.  3. Fracture deformity of the right pubis medially which may well be old.     XR Lumbar Spine 2/3 Views    Narrative    EXAM: XR LUMBAR SPINE 2-3 VIEWS  LOCATION: [facility]  DATE/TIME: 10/6/2021 5:58 PM    INDICATION: fall, pain  COMPARISON: 08/16/2021  TECHNIQUE: CR Lumbar Spine.      Impression    IMPRESSION: Diffuse osseous demineralization significant caliber is evaluation for a subtle fracture. If there is clinical concern, recommend further characterization with CT.    No significant change in chronic compression deformities of the T11-L3 vertebral bodies. Remaining vertebral body heights are maintained. Moderate to advanced degenerative disc disease at L4-L5 and L5-S1. Lower lumbar facet arthropathy. No acute   extraspinal abnormality.   XR Chest Port 1 View    Narrative    EXAM: XR CHEST PORT 1 VIEW  LOCATION: [facility]  DATE/TIME: 10/6/2021 8:22 PM    INDICATION: hypoxia  COMPARISON: 10/31/2020      Impression    IMPRESSION: No focal infiltrate,  pleural effusion or pneumothorax. The cardiac and mediastinal silhouettes are normal.

## 2021-10-07 NOTE — PROGRESS NOTES
Care Management Follow Up    Length of Stay (days): 1    Expected Discharge Date: 10/09/2021     Concerns to be Addressed:       Patient plan of care discussed at interdisciplinary rounds: Yes    Anticipated Discharge Disposition:  TCU vs [facility]    Referrals Placed by CM/DOM:    Private pay costs discussed: Not applicable    Additional Information:  DOM reviewed chart. SW placed call to [facility] ([phone]) and left VM for nurse requesting call back to discuss pt services and ability to manage pt needs at discharge. DOM following for PT/OT recs as well.      Maisha Genao, [facility]    Addendum: SW reviewed PT recommendations and spoke with pts son, Gael, over the phone. He confirms pt has been back at her [facility] for some time prior to this admission. Discussed Pt recommendation for TCU vs returning to [facility] with home care if the [facility] is able to do an assist of 2 people. He reports understanding and openness to either plan pending pts progression and recommendations from PT and MD. He requests referral to [facility] as this is where pt has gone previously and had a good experience. DOM will plan to follow up with him further pending information from [facility] and pts progression tomorrow.    DOM sent initial referral to [facility] TCU.  3:29 PM    SW received call from [facility] who report they will look at pt again tomorrow to review status of cdiff testing.  4:08 PM

## 2021-10-07 NOTE — ED NOTES
[facility]  ED Nurse Handoff Report    Daya Ignacio is a 89 year old female   ED Chief complaint: Fall and Pubis Ramus Fracture  . ED Diagnosis:   Final diagnoses:   Fall, initial encounter   Closed fracture of multiple pubic rami, left, initial encounter (H)     Allergies:   Allergies   Allergen Reactions     Atorvastatin Muscle Pain (Myalgia)     Macrobid [Nitrofurantoin Anhydrous] Other (See Comments)     Body aches     Nitrofurantoin Muscle Pain (Myalgia) and Unknown     Body aches       Penicillins Unknown     Seasonal Allergies Unknown     Sulfa Drugs Other (See Comments)     Tolerated Bactrim May 2019  Entire family has allergy to Sulfa     Sulfasalazine Unknown       Code Status: Full Code  Activity level - Baseline/Home:  Assist X 1. Activity Level - Current:   Total Care. Lift room needed: No. Bariatric: No   Needed: No   Isolation: No. Infection: Not Applicable.     Vital Signs:   Vitals:    10/06/21 2000 10/06/21 2015 10/06/21 2030 10/06/21 2045   BP: 105/54 120/51 101/52 113/59   Pulse: 70 71 68 72   Resp:       Temp:       TempSrc:       SpO2: 94% 94% 94% 97%       Cardiac Rhythm:  ,      Pain level:    Patient confused: Yes. Patient Falls Risk: Yes.   Elimination Status: Has voided   Patient Report - Initial Complaint: Fall, Pubis Ramus Fracture. Focused Assessment:    Daya Ignacio is a 89 year old female who presents with a fall and pubis ramus fracture. The patient had a fall at her nursing facility. X-rays this morning showed that she had a fracture in the left superior pubic ramus. She has low back pain, but no anterior pain. She denies any head injury. She can normally walk with a walker or a cane. She states that she mostly has a normal appetite, and has normal bowel movements and urination.     Review of Systems   Constitutional: Negative for appetite change.   Musculoskeletal: Positive for back pain.   Neurological:        No head injury   All other systems  reviewed and are negative.   Tests Performed: x-ray, labs, covid. Abnormal Results:   Labs Ordered and Resulted from Time of ED Arrival Up to the Time of Departure from the ED   BASIC METABOLIC PANEL - Abnormal; Notable for the following components:       Result Value    Urea Nitrogen 39 (*)     Creatinine 1.34 (*)     Glucose 116 (*)     GFR Estimate 35 (*)     All other components within normal limits   CBC WITH PLATELETS AND DIFFERENTIAL - Abnormal; Notable for the following components:    WBC Count 13.0 (*)     RBC Count 2.27 (*)     Hemoglobin 7.8 (*)     Hematocrit 24.1 (*)      (*)     MCH 34.4 (*)     RDW 16.6 (*)     Platelet Count 127 (*)     Absolute Neutrophils 10.9 (*)     Absolute Immature Granulocytes 0.2 (*)     All other components within normal limits   COVID-19 VIRUS (CORONAVIRUS) BY PCR   CBC WITH PLATELETS & DIFFERENTIAL    Narrative:     The following orders were created for panel order CBC with platelets differential.  Procedure                               Abnormality         Status                     ---------                               -----------         ------                     CBC with platelets and d...[[id number]]  Abnormal            Final result                 Please view results for these tests on the individual orders.     XR Chest Port 1 View   Final Result   IMPRESSION: No focal infiltrate, pleural effusion or pneumothorax. The cardiac and mediastinal silhouettes are normal.      XR Lumbar Spine 2/3 Views   Final Result   IMPRESSION: Diffuse osseous demineralization significant caliber is evaluation for a subtle fracture. If there is clinical concern, recommend further characterization with CT.      No significant change in chronic compression deformities of the T11-L3 vertebral bodies. Remaining vertebral body heights are maintained. Moderate to advanced degenerative disc disease at L4-L5 and L5-S1. Lower lumbar facet arthropathy. No acute    extraspinal  abnormality.      XR Pelvis 1/2 Views   Final Result   IMPRESSION:   1. Fracture of the left superior pubic ramus with mild displacement.   2. Fracture of the left inferior pubic ramus.   3. Fracture deformity of the right pubis medially which may well be old.         .   Treatments provided: see emar  Family Comments: Gael (Son) [phone]  OBS brochure/video discussed/provided to patient:  YES  ED Medications:   Medications   albuterol (PROAIR HFA/PROVENTIL HFA/VENTOLIN HFA) 108 (90 Base) MCG/ACT inhaler 4 puff (has no administration in time range)   acetaminophen (TYLENOL) tablet 1,000 mg (1,000 mg Oral Given 10/6/21 1923)   0.9% sodium chloride BOLUS (500 mLs Intravenous New Bag 10/6/21 2002)     Drips infusing:  no  For the majority of the shift, the patient's behavior Green. Interventions performed were none.    Sepsis treatment initiated: No     Patient tested for COVID 19 prior to admission: YES    ED Nurse Name/Phone Number: Guicho Escobedo RN,   9:00 PM  RECEIVING UNIT ED HANDOFF REVIEW    Above ED Nurse Handoff Report was reviewed: Yes  Reviewed by: Saman Poole RN on October 6, 2021 at 9:39 PM

## 2021-10-07 NOTE — ED NOTES
This writer attempted to wean patient off of supplemental oxygen. Oxygen saturation dropped to 88% on room air. Patient placed back on 2L supplemental oxygen. SPO2 95%.

## 2021-10-07 NOTE — CONSULTS
"CLINICAL NUTRITION SERVICES  -  ASSESSMENT NOTE      MALNUTRITION:  % Weight Loss: Unable to determine, need reweigh while admitted  % Intake: Unable to determine, need reweigh as patient cannot report   Subcutaneous Fat Loss:  Orbital region mild depletion and Upper arm region moderate depletion  Muscle Loss:  Temporal region mild to moderate depletion, Clavicle bone region mild to moderate depletion, Acromion bone region moderate depletion, Patellar region mild to moderate depletion, Anterior thigh region mild to mderate depletion and Posterior calf region mild to moderate depletion  Fluid Retention: None documented or noted    Malnutrition Diagnosis: Non-Severe malnutrition  In Context of:  Chronic illness or disease (advanced age)        REASON FOR ASSESSMENT  Daya Ignacio is a 89 year old female seen by Registered Dietitian for Admission Nutrition Risk Screen for positive.    PMH of: Compression fractures, C. Diff, cognitive impairment, anxiety, depression.      Admit 2/2: Fall resulting in L superior and inferior pubic rami fractures, AYAKA.    NUTRITION HISTORY  - Information obtained from mostly chart.  Patient with documented cognitive impairment and does not provide much history.    - Diet at home: Regular.  States she has the option to pay for meals at her living facility.  - Usual intakes: TID?  - Barriers to PO intakes: Not able to provide/report.  - Use of oral supplements: Not able to provide/report.  - Allergies: NKFA.      CURRENT NUTRITION ORDERS  Diet Order:     Regular    Current Intake/Tolerance:  Limited timeframe, helped to order a lunch meal.      NUTRITION FOCUSED PHYSICAL ASSESSMENT FOR DIAGNOSING MALNUTRITION)  Yes    Obtained from Chart/Interdisciplinary Team:  - Requiring 2 L NC  - No documentation of PI  - Stooling patterns reviewed    ANTHROPOMETRICS  Height: 5' 1\"  Weight: 115 lbs 8 oz  Body mass index is 21.82 kg/m .  Weight Status:  Normal BMI  Weight History:  Wt Readings from Last " 10 Encounters:   10/06/21 52.4 kg (115 lb 8 oz)   08/15/21 58.1 kg (128 lb 1.6 oz)   06/29/21 56.2 kg (124 lb)   05/08/21 58.2 kg (128 lb 6.4 oz)   11/18/20 57.6 kg (127 lb)   10/30/20 57.6 kg (127 lb)   09/30/20 56.2 kg (124 lb)   05/28/20 55.2 kg (121 lb 9.6 oz)   05/07/20 59.6 kg (131 lb 8 oz)   11/20/19 56.5 kg (124 lb 9.6 oz)     - Will obtain reweigh to determine if admit wt accurate.  Would indicate at least 7% wt loss in ~2 months.  Does not appear that previous wt trends were impacted by fluid and UBW appears to be around 125#.      LABS  Labs reviewed:  Electrolytes  Potassium (mmol/L)   Date Value   10/07/2021 4.4   10/06/2021 4.9   08/26/2021 4.0   05/22/2021 4.4   05/16/2021 4.3   05/12/2021 4.0     Phosphorus (mg/dL)   Date Value   07/15/2019 5.5 (H)   06/24/2011 4.1   06/23/2011 4.4    Blood Glucose  Glucose (mg/dL)   Date Value   10/07/2021 98   10/06/2021 116 (H)   08/26/2021 81   08/16/2021 126 (H)   05/28/2021 87   05/22/2021 86   05/16/2021 95   05/12/2021 93   05/09/2021 93   05/08/2021 105 (H)     Hemoglobin A1C (%)   Date Value   01/13/2017 5.3   08/08/2015 5.2    Inflammatory Markers  CRP Inflammation (mg/L)   Date Value   05/11/2020 21.5 (H)   05/10/2020 67.2 (H)   05/09/2020 182.0 (H)     WBC (10e9/L)   Date Value   05/22/2021 5.5   05/16/2021 7.8   05/12/2021 7.3     WBC Count (10e3/uL)   Date Value   10/07/2021 10.6   10/06/2021 13.0 (H)   08/16/2021 9.5     Albumin (g/dL)   Date Value   11/03/2020 2.6 (L)   05/06/2020 3.7   12/10/2019 4.0   08/21/2019 3.0 (L)      Magnesium (mg/dL)   Date Value   08/29/2019 1.7   08/26/2019 1.8   08/01/2019 1.5 (L)     Sodium (mmol/L)   Date Value   10/07/2021 137   10/06/2021 134   08/26/2021 133 (L)   05/22/2021 135   05/16/2021 135   05/12/2021 136    Renal  Urea Nitrogen (mg/dL)   Date Value   10/07/2021 40 (H)   10/06/2021 39 (H)   08/26/2021 17   05/22/2021 21   05/16/2021 19   05/12/2021 17     Creatinine (mg/dL)   Date Value   10/07/2021 1.11 (H)    10/06/2021 1.34 (H)   08/26/2021 0.67   05/22/2021 0.69   05/16/2021 0.63   05/12/2021 0.63     Additional  Triglycerides (mg/dL)   Date Value   02/19/2019 95   02/05/2018 92   01/13/2017 94     Ketones Urine (mg/dL)   Date Value   10/07/2021 Negative   05/09/2021 Trace (A)        B/P: 118/45, T: 97.4, P: 75, R: 18      MEDICATIONS  Medications reviewed:    acetaminophen  1,000 mg Oral TID     amLODIPine  10 mg Oral Daily     carvedilol  12.5 mg Oral QAM     carvedilol  25 mg Oral At Bedtime     cloNIDine  0.1 mg Oral At Bedtime     diclofenac  4 g Topical 4x Daily     gabapentin  300 mg Oral BID     isosorbide dinitrate  10 mg Oral TID     Lidocaine  2 patch Transdermal Q24H     lidocaine   Transdermal Q8H     lisinopril  30 mg Oral Daily     omeprazole  20 mg Oral Daily     potassium chloride ER  10 mEq Oral Daily     senna-docusate  1 tablet Oral Daily     sertraline  100 mg Oral Daily     sodium chloride (PF)  3 mL Intracatheter Q8H     vitamin C  500 mg Oral Daily     Vitamin D3  50 mcg Oral Daily        sodium chloride 100 mL/hr at 10/06/21 2228          ASSESSED NUTRITION NEEDS PER APPROVED PRACTICE GUIDELINES:    Dosing Weight 52 kg - accuracy?   Estimated Energy Needs: 30-35 Kcal/Kg  Justification: repletion  Estimated Protein Needs: 1.2-1.5 g pro/Kg  Justification: preservation of lean body mass  Estimated Fluid Needs: per MD      NUTRITION DIAGNOSIS:  Predicted inadequate nutrient intake (energy/protein) related to potential for weight loss PTA, meeting malnutrition criteria based on NFPE alone.    NUTRITION INTERVENTIONS  Recommendations / Nutrition Prescription  Diet per MD.    Plan for oral supplement offering at follow-up, if decline in PO intakes noted.    Reweigh during admit as able.      Implementation  Nutrition education: Not appropriate at this time due to patient condition.    Nutrition Goals  Patient to consume at least 75% of meals TID.       MONITORING AND EVALUATION:  Progress towards  goals will be monitored and evaluated per protocol and Practice Guidelines          Lizzy Hilliard RDN, LD  Clinical Dietitian  3rd floor/ICU: [phone]  All other floors: [phone]  Weekend/holiday: [phone]

## 2021-10-07 NOTE — PLAN OF CARE
Admission - 0700  /45 (BP Location: Right arm)   Pulse 75   Temp 97.4  F (36.3  C) (Oral)   Resp 18   Wt 52.4 kg (115 lb 8 oz)   SpO2 93%   BMI 21.82 kg/m    VSS on 2lpm. Pt is disorientated to situation but cooperative with cares. Pt has yet to demonstrate ability to call appropriately, bed alarm active. Tolerating sips of water w/ meds. C/o 8-10/10 left hip pain Lidocaine patch x2 applied, PRN Dilaudid administered with minimal relief PRN Atarax administered for break through pain. Pt sleeping peacefully. PW in place, no output yet, bladder scanned for 243mL and again for 390mL, pt able to void 100mL, fluids promoted. Pt was very hypotensive at 2300 VS (87/29), RN not notified, BP rechecked and found to be 111/47 HR 66, pt described being tired but otherwise assymptomatic, will continue to monitor BP. BP recheck 118/45. Trialed removing supplemental O2 stats dropped to 87-90%, resumed 2lpm NC. Plan to provide pain management, obtain urine sample, consult Ortho, PT, and SW while resuming home meds and holding NSAIDs. Will continue to provide supportive cares.

## 2021-10-07 NOTE — PROGRESS NOTES
10/07/21 1500   Quick Adds   Type of Visit Initial PT Evaluation   Living Environment   People in home alone;facility resident   Living Environment Comments Per chart pt lives in an [facility] and uses a walker at baseline. Patient is a poor historian this session. Endorses she does use a walker.    Self-Care   Current Activity Tolerance poor   General Information   Onset of Illness/Injury or Date of Surgery 10/06/21   Referring Physician Nicole Hernandez, PA-C   Patient/Family Therapy Goals Statement (PT) Pt not able to state.    Pertinent History of Current Problem (include personal factors and/or comorbidities that impact the POC) 89 year old female with known multilevel compression fractures, ESBL UTIs, C diff, HTN, cognitive impairment, HTN, HLP and anxiety/depression, who was admitted on 10/6/2021 after a fall and found to have left superior and inferior pubic rami fractures with mild displacement of the superior pubic ramus fx.   Existing Precautions/Restrictions fall   Cognition   Orientation Status (Cognition) oriented to;person   Affect/Mental Status (Cognition) confused   Follows Commands (Cognition) follows one-step commands   Pain Assessment   Patient Currently in Pain Yes, see Vital Sign flowsheet  (reports back and L hip pain; not rated)   Bed Mobility   Comment (Bed Mobility) Trialed supine>sit with maxA at trunk, not able to complete, crying out in pain.    Clinical Impression   Criteria for Skilled Therapeutic Intervention yes, treatment indicated   PT Diagnosis (PT) Impaired gait   Influenced by the following impairments Pain, decreased activity tolerance   Functional limitations due to impairments Decreased IND with bed mobility, transfers, gait   Clinical Presentation Evolving/Changing   Clinical Presentation Rationale high pain   Clinical Decision Making (Complexity) low complexity   Therapy Frequency (PT) Daily   Predicted Duration of Therapy Intervention (days/wks) 3 days   Planned Therapy  Interventions (PT) bed mobility training;gait training;patient/family education;transfer training;progressive activity/exercise   Risk & Benefits of therapy have been explained evaluation/treatment results reviewed;participants included;patient;care plan/treatment goals reviewed   PT Discharge Planning    PT Discharge Recommendation (DC Rec) Transitional Care Facility;home with assist;home with home care physical therapy   PT Rationale for DC Rec Patient poor historian but reports she is ambulatory PTA with walker. Recommend TCU to return to PLOF; it not pt would need Ax2 at her carefacility for mobility and HHPT.    Total Evaluation Time   Total Evaluation Time (Minutes) 3

## 2021-10-08 LAB
ABO/RH(D): NORMAL
ANION GAP SERPL CALCULATED.3IONS-SCNC: 4 MMOL/L (ref 3–14)
ANTIBODY SCREEN: NEGATIVE
BACTERIA UR CULT: ABNORMAL
BASOPHILS # BLD AUTO: 0 10E3/UL (ref 0–0.2)
BASOPHILS NFR BLD AUTO: 0 %
BLD PROD TYP BPU: NORMAL
BLOOD COMPONENT TYPE: NORMAL
BUN SERPL-MCNC: 35 MG/DL (ref 7–30)
CALCIUM SERPL-MCNC: 8.3 MG/DL (ref 8.5–10.1)
CHLORIDE BLD-SCNC: 111 MMOL/L (ref 94–109)
CO2 SERPL-SCNC: 22 MMOL/L (ref 20–32)
CODING SYSTEM: NORMAL
CREAT SERPL-MCNC: 0.75 MG/DL (ref 0.52–1.04)
CROSSMATCH: NORMAL
EOSINOPHIL # BLD AUTO: 0.1 10E3/UL (ref 0–0.7)
EOSINOPHIL NFR BLD AUTO: 2 %
ERYTHROCYTE [DISTWIDTH] IN BLOOD BY AUTOMATED COUNT: 16.9 % (ref 10–15)
GFR SERPL CREATININE-BSD FRML MDRD: 70 ML/MIN/1.73M2
GLUCOSE BLD-MCNC: 93 MG/DL (ref 70–99)
HCT VFR BLD AUTO: 21.1 % (ref 35–47)
HGB BLD-MCNC: 6.8 G/DL (ref 11.7–15.7)
IMM GRANULOCYTES # BLD: 0.1 10E3/UL
IMM GRANULOCYTES NFR BLD: 1 %
ISSUE DATE AND TIME: NORMAL
LYMPHOCYTES # BLD AUTO: 0.6 10E3/UL (ref 0.8–5.3)
LYMPHOCYTES NFR BLD AUTO: 8 %
MCH RBC QN AUTO: 34.9 PG (ref 26.5–33)
MCHC RBC AUTO-ENTMCNC: 32.2 G/DL (ref 31.5–36.5)
MCV RBC AUTO: 108 FL (ref 78–100)
MONOCYTES # BLD AUTO: 0.4 10E3/UL (ref 0–1.3)
MONOCYTES NFR BLD AUTO: 6 %
NEUTROPHILS # BLD AUTO: 6 10E3/UL (ref 1.6–8.3)
NEUTROPHILS NFR BLD AUTO: 83 %
NRBC # BLD AUTO: 0 10E3/UL
NRBC BLD AUTO-RTO: 0 /100
PLAT MORPH BLD: NORMAL
PLATELET # BLD AUTO: 109 10E3/UL (ref 150–450)
POTASSIUM BLD-SCNC: 4.1 MMOL/L (ref 3.4–5.3)
RBC # BLD AUTO: 1.95 10E6/UL (ref 3.8–5.2)
RBC MORPH BLD: NORMAL
SODIUM SERPL-SCNC: 137 MMOL/L (ref 133–144)
SPECIMEN EXPIRATION DATE: NORMAL
UNIT ABO/RH: NORMAL
UNIT NUMBER: NORMAL
UNIT STATUS: NORMAL
UNIT TYPE ISBT: 600
WBC # BLD AUTO: 7.3 10E3/UL (ref 4–11)

## 2021-10-08 PROCEDURE — 99222 1ST HOSP IP/OBS MODERATE 55: CPT | Performed by: NURSE PRACTITIONER

## 2021-10-08 PROCEDURE — P9016 RBC LEUKOCYTES REDUCED: HCPCS | Performed by: HOSPITALIST

## 2021-10-08 PROCEDURE — 86923 COMPATIBILITY TEST ELECTRIC: CPT | Performed by: HOSPITALIST

## 2021-10-08 PROCEDURE — 86900 BLOOD TYPING SEROLOGIC ABO: CPT | Performed by: HOSPITALIST

## 2021-10-08 PROCEDURE — 85025 COMPLETE CBC W/AUTO DIFF WBC: CPT | Performed by: HOSPITALIST

## 2021-10-08 PROCEDURE — 80048 BASIC METABOLIC PNL TOTAL CA: CPT | Performed by: HOSPITALIST

## 2021-10-08 PROCEDURE — 250N000013 HC RX MED GY IP 250 OP 250 PS 637: Performed by: NURSE PRACTITIONER

## 2021-10-08 PROCEDURE — 120N000004 HC R&B MS OVERFLOW

## 2021-10-08 PROCEDURE — 99233 SBSQ HOSP IP/OBS HIGH 50: CPT | Performed by: HOSPITALIST

## 2021-10-08 PROCEDURE — 250N000013 HC RX MED GY IP 250 OP 250 PS 637: Performed by: PHYSICIAN ASSISTANT

## 2021-10-08 PROCEDURE — 36415 COLL VENOUS BLD VENIPUNCTURE: CPT | Performed by: HOSPITALIST

## 2021-10-08 RX ORDER — GABAPENTIN 100 MG/1
100 CAPSULE ORAL ONCE
Status: COMPLETED | OUTPATIENT
Start: 2021-10-08 | End: 2021-10-08

## 2021-10-08 RX ORDER — HYDROXYZINE HYDROCHLORIDE 10 MG/1
10 TABLET, FILM COATED ORAL EVERY 8 HOURS
Status: DISCONTINUED | OUTPATIENT
Start: 2021-10-08 | End: 2021-10-09 | Stop reason: HOSPADM

## 2021-10-08 RX ORDER — GABAPENTIN 100 MG/1
200 CAPSULE ORAL 3 TIMES DAILY
Status: DISCONTINUED | OUTPATIENT
Start: 2021-10-08 | End: 2021-10-09 | Stop reason: HOSPADM

## 2021-10-08 RX ADMIN — LIDOCAINE 2 PATCH: 246 PATCH TOPICAL at 23:43

## 2021-10-08 RX ADMIN — ACETAMINOPHEN 1000 MG: 500 TABLET, FILM COATED ORAL at 08:58

## 2021-10-08 RX ADMIN — GABAPENTIN 300 MG: 300 CAPSULE ORAL at 08:59

## 2021-10-08 RX ADMIN — HYDROXYZINE HYDROCHLORIDE 10 MG: 10 TABLET, FILM COATED ORAL at 16:58

## 2021-10-08 RX ADMIN — OXYCODONE HYDROCHLORIDE AND ACETAMINOPHEN 500 MG: 500 TABLET ORAL at 09:00

## 2021-10-08 RX ADMIN — GABAPENTIN 100 MG: 100 CAPSULE ORAL at 16:58

## 2021-10-08 RX ADMIN — CARVEDILOL 25 MG: 25 TABLET, FILM COATED ORAL at 21:25

## 2021-10-08 RX ADMIN — HYDROMORPHONE HYDROCHLORIDE 2 MG: 2 TABLET ORAL at 23:47

## 2021-10-08 RX ADMIN — CARVEDILOL 12.5 MG: 12.5 TABLET, FILM COATED ORAL at 09:00

## 2021-10-08 RX ADMIN — DICLOFENAC SODIUM 4 G: 10 GEL TOPICAL at 12:42

## 2021-10-08 RX ADMIN — DICLOFENAC SODIUM 4 G: 10 GEL TOPICAL at 17:41

## 2021-10-08 RX ADMIN — GABAPENTIN 200 MG: 100 CAPSULE ORAL at 21:23

## 2021-10-08 RX ADMIN — Medication 50 MCG: at 09:00

## 2021-10-08 RX ADMIN — SERTRALINE HYDROCHLORIDE 100 MG: 100 TABLET ORAL at 09:00

## 2021-10-08 RX ADMIN — DICLOFENAC SODIUM 4 G: 10 GEL TOPICAL at 09:34

## 2021-10-08 RX ADMIN — ACETAMINOPHEN 1000 MG: 500 TABLET, FILM COATED ORAL at 14:24

## 2021-10-08 RX ADMIN — POTASSIUM CHLORIDE 10 MEQ: 750 TABLET, FILM COATED, EXTENDED RELEASE ORAL at 08:58

## 2021-10-08 RX ADMIN — OMEPRAZOLE 20 MG: 20 CAPSULE, DELAYED RELEASE ORAL at 09:00

## 2021-10-08 RX ADMIN — HYDROXYZINE HYDROCHLORIDE 10 MG: 10 TABLET, FILM COATED ORAL at 23:47

## 2021-10-08 RX ADMIN — HYDROMORPHONE HYDROCHLORIDE 2 MG: 2 TABLET ORAL at 04:47

## 2021-10-08 RX ADMIN — HYDROMORPHONE HYDROCHLORIDE 2 MG: 2 TABLET ORAL at 09:35

## 2021-10-08 RX ADMIN — ACETAMINOPHEN 1000 MG: 500 TABLET, FILM COATED ORAL at 21:24

## 2021-10-08 NOTE — PLAN OF CARE
INPATIENT NOTE [id number]        Temp: 98.4  F (36.9  C) Temp src: Oral BP: (!) 146/61 Pulse: 71   Resp: 18 SpO2: 99 % O2 Device: Nasal cannula Oxygen Delivery: 2 LPM    Pt is alert, but disoriented to time and situation. Pt did not get OOB during this shift. Contact/Enteric precautions maintained. Pt is on O2 - 2L via nasal cannula. Pt has purewick in place due to brief changes being incredibly painful for pt. Pt was not able to void. Bladder scanned pt for 489mL; straight cath pt and received an output volume of 700mL. PO dilaudid was administered x1 for pain after brief and purewick change. Pt is forgetful and needs frequent reminders. Hypotensive - carvidelol held. Plan to discharge to TCU. PT and SW following. Will continue to monitor and provide supportive cares.

## 2021-10-08 NOTE — PROGRESS NOTES
Care Management Follow Up    Length of Stay (days): 2    Expected Discharge Date: 10/09/2021     Concerns to be Addressed: care coordination/care conferences, discharge planning     Patient plan of care discussed at interdisciplinary rounds: Yes    Anticipated Discharge Disposition: Transitional Care     Anticipated Discharge Services: None  Anticipated Discharge DME: None    Patient/family educated on Medicare website which has current facility and service quality ratings: yes  Education Provided on the Discharge Plan:  yes  Patient/Family in Agreement with the Plan: yes    Referrals Placed by CM/SW: Post Acute Facilities   Private pay costs discussed: transportation costs    Additional Information:  Pt has been accepted to TCU bed at [facility], they have submitted for insurance authorization and are awaiting response on this at this time. Per provider, hopeful for discharge tomorrow 10/9 pending insurance authorization and medical clearance.     Called and updated pt's son Gael [phone] who is agreeable to this plan. He prefers WC transport at discharge if pt remains assist of 2. Reviewed out of pocket cost for [facility] WC transport, $78.65 for base rate and $5.06 per mile to the destination. Pt/family expressed understanding and are agreeable to this.     Your information has been submitted on October 08th, 2021 at 12:24:52 PM CDT. The confirmation number is [id number].     Update 1400: Met with pt at bedside, updated on above, she is agreeable to this plan.     Update 1555: Received call from [facility] reporting that they have received insurance authorization and pt would be able to admit to them tomorrow afternoon if medically appropriate.     Shea Starr RN BSN   Inpatient Care Coordination  [facility]   Phone [phone]

## 2021-10-08 NOTE — PROGRESS NOTES
[facility]    Medicine Progress Note - Hospitalist Service       Date of Admission:  10/6/2021    Assessment & Plan           Daya Ignacio is a 89 year old female with past medical history of hypertension, chronic anemia, lumbar compression fractures with chronic back pain, multiple hospitalizations UTI (VRE, ESBL), history of C. Difficile, diverticular GI bleed, CAD (STEMI 5/2017), CHF (with preserved EF), depression, neuropathy, nephrolithiasis, UTI with left stent placement/lithotripsy in 2020. Admitted 10/6/2021 after fall/pelvic pain. Found to have L superior/inferior pubic rami fractures. On 10/7 had abdominal discomfort and watery stool. This has resolved. More back pain today.    Mechanical fall with L inf/sup pubic rami fractures    - was ambulating with caregiver at assisted living    - in setting of chronic back pain with previous vertebral/pelvic fracture    - pain control with scheduled Tylenol, lidocaine patches, home PRN oral dilaudid    - pain was better on 10/7, now more pain today (states it is 9/10)    - Pain team consult    - OOB    - seen by PT: TCU, I spoke with care coordinator    Nausea, abdo pain, diarrhea    - had in am on 10/8    - stopped stool softeners    - sent for enteric/C diff: no further stools so will discontinue enteric/c diff orders    - stop IVF    - tolerating    Acute renal failure    - likely pre-renal    - now resolved    - resume lisinopril    Acute on chronic anemia    - baseline appears to be about 9-10    - today is 7.8>7.1>6.8 today    - no signs of bleeding (stools light brown)    - transfuse 1 unit    Thrombocytopenia    - has had in the past    - monitor    Hypotension (in the setting of baseline HTN) on 10/7    - held home meds: imdur, lisinopril, amlodipine and clonidine    - continued carvedilol     - now BPs creeping up    - will restart lisinopril and add back home meds if BP tolerates    History of ESBL UTI    - UA looks ok    -  pending culture    - no antibiotics at this time     History CAD with STEMI in 5/2017    - holding Imdur, will add back if BP stays stable    - cont carvedilol    - not on asa as outpt     Depression   - cont home sertraline     Chronic pain    - cont PRN dilaudid    - Pain consult     GERD    - cont pta PPI     Called and updated son       Diet: Combination Diet Regular Diet Adult    DVT Prophylaxis: Pneumatic Compression Devices (anemia and thrombocytopenia)  Almanza Catheter: Not present  Central Lines: None  Code Status: No CPR- Do NOT Intubate      Disposition Plan   Expected discharge: 10/09/2021   recommended to transitional care unit once safe disposition plan/ TCU bed available.     The patient's care was discussed with the Bedside Nurse, Care Coordinator/, Patient and Patient's Family.    Juliano Mckeon MD  Hospitalist Service  [facility]  Securely message with the Vocera Web Console (learn more here)  Text page via [employer] Paging/Directory      Clinically Significant Risk Factors Present on Admission            # Non-Severe Malnutrition, POA: based on Subcutaneous fat loss;Muscle loss (10/07/21 1321)     ______________________________________________________________________    Interval History   Patient in bed. States her back pain is a 9/10. No abdominal pain currently. No nausea or vomiting. No further stools since yesterday am. Eating some breakfast    Data reviewed today: I reviewed all medications, new labs and imaging results over the last 24 hours. I personally reviewed no images or EKG's today.    Physical Exam   Vital Signs: Temp: 97.9  F (36.6  C) Temp src: Oral BP: (!) 154/69 Pulse: 75   Resp: 18 SpO2: 96 % O2 Device: Nasal cannula Oxygen Delivery: 2 LPM  Weight: 115 lbs 8 oz  Constitutional: awake, alert, cooperative, no apparent distress, and appears stated age  Eyes: Lids and lashes normal, pupils equal, round and reactive to light, extra ocular muscles  intact, sclera clear, conjunctiva normal  ENT: Normocephalic, without obvious abnormality, atraumatic, sinuses nontender on palpation, external ears without lesions, oral pharynx with moist mucous membranes, tonsils without erythema or exudates, gums normal and good dentition.  Respiratory: basilar crackles  Cardiovascular: Normal apical impulse, regular rate and rhythm, normal S1 and S2, no S3 or S4, and no murmur noted  GI: No scars, normal bowel sounds, soft, non-distended, non-tender, no masses palpated, no hepatosplenomegally  Skin: no bruising or bleeding  Musculoskeletal: no lower extremity pitting edema present    Data   Recent Labs   Lab 10/08/21  0649 10/07/21  0521 10/06/21  2003   WBC 7.3 10.6 13.0*   HGB 6.8* 7.1* 7.8*   * 107* 106*   PLT  --  106* 127*    137 134   POTASSIUM 4.1 4.4 4.9   CHLORIDE 111* 106 103   CO2 22 26 25   BUN 35* 40* 39*   CR 0.75 1.11* 1.34*   ANIONGAP 4 5 6   DAVE 8.3* 8.7 9.2   GLC 93 98 116*     No results found for this or any previous visit (from the past 24 hour(s)).

## 2021-10-08 NOTE — CONSULTS
[facility]  Pain Service Consultation   Text Page    Date of Admission:  10/6/2021    Assessment & Plan   Daya Ignacio is a 89 year old female who was admitted on 10/6/2021. I was asked by  Juliano Mckeon MD  to see the patient for acute on chronic pain s/p fall with close pubis fracture .    PLAN:   1)  Opioids:   Dilaudid 2 mg every 4 hrs prn -- dose appropriate for acute on chronic pain   Opioids Treatment Goal:   -Improvement in function  -Participate in PT    2)Non-opioid multimodal medication therapy  -Topical:Voltaren 1% Topical Gel 4 grams qid to low back and pelvis, Lidocaine Patch 4% to same are at hs   -N-SAIDS:Avoid due to age and CKD   -Muscle Relaxants: avoid due to age and increase risk of increase confusion   -Adjuvants:Acetaminophen 1000 mg tid , Gabapentin  200 mg tid , Hydroxyzine  10 mg every 8 hrs   -Antidepresants/anxiolytics: none     3)  Non-medication interventions  Positioning, ICE, Distraction, Physical therapy, consider Brace corset type with orthosis dept    4)  Constipation Prophylaxis    Continue to Monitor, Bowel Meds PRN per Constipation Order Set, Senna-S  1-2 bid, Polyethylene Glycol  Every day prn    5) Medication Risk reduction strategies   -monitor for sedation   -pulse ox   -narcan for opioid reversal     6)  Pain Education  -Opioid safe use, storage and disposal information included in DC AVS    7)  DC Planning   Discussed goal of Opioid therapy as above with patient   Recommend short supply of opioids only (3 days) per CDC guidelines for acute pain management.  Continued outpatient management of pain per Eliud Brooks or TCU provider   Disposition: likely TCU   Support systems:  Care givers, familyt   Outpatient Referrals: none   The following risk factors have been identified for unintentional overdose: patient is on multiple sedating medications , patient is > 65 years old, patient is taking a high amount of opioids in 24 hour period and patient  hascompromised kidney or liver fuction . Discharge with intra-nasal naloxone if discharged to home with opioids  >40 mg MME/day.  Plan for education prior to discharge.    ASSESSMENT  1)  Acute on chronic low back pain s/p fall resulting in closed pubis rami fx     2)  Patient with chronic back  pain, on chronic opioid therapy managed by  Eliud Brooks   Baseline  16 mg Daily Morphine Equivalent as dispensed.  Patient has a possible opioid tolerance.     Patient's opioid use in past 24 hours:  Hydromorphone 6 mg  =  16 mg Daily Morphine Equivalent    3)  Risk factors for opioid related harms  -Renal insufficiency  - Age > 65 years old  -Anxiety/depression    4)  Opioid induced side-effects:  -Constipation none   -Nausea/Vomit none   -Sedation none   -Urinary Retention none     5)  Other/Related:    -Depression/anxiety  -Deconditioning      Time Spent on this Encounter   Total unit/floor time 45  minutes, time consisted of the following, examination of the patient, reviewing the record and completing documentation. >50% of time spent in counseling and coordination of care.  Time spend counseling with patient consisted of the following topics, symptom management.  Time spent in coordination of care with Hospitalist Juliano Mckeon MD.     Lizzy Porter RN, PGMT-BC, APRN, CNP, ACHPN   Pain Management and Palliative Care  [facility]  Pgr: [phone]      Reason for Consult   Reason for consult: I was asked to evaluate this patient for acute on chronic pain .    Primary Care Physician   Primary Care Physician:DINORAH DISLA  Pain Specialist: Eliud Brooks MD    Chief Complaint    acute back and hip pain on chronic back pain     History is obtained from the patient and electronic health record    History of Present Illness   Daya Ignacio is a 89 year old female who presents with a complex medical hx noted below. She fell at home and this resulted in a closed pubis rami fracture she is  confused intermittently to place and events,but pleasant. She denies fever,chills, generalized body ache, chest pain SOB, constipation, diarrerhea, changes in urination     CURRENT PAIN:  Her pain is located in the  Hip and low back   It is described as Aching, Penetrating, Sharp, Shooting, Tender and Unbearable  She rates it as ranging between 8/10 and 10/10  The average is 8/10 on a scale of 0-10  Currently it is rated as 10 /10  It improves by  Rest, medications,   It worsens by  Movement   She been compliant with the recommendations while in the hospital.      PAIN HISTORY:  The pain is mainly located in the  Low back   It is described as Aching, Sharp and Tender  Rates it as ranging between 7/10 and 10/10  Currently it is rated as 10/10  It improves by  Medication,  It worsens by  Movement   She  been compliant with the recommendations while in the hospital.      PAST PAIN TREATMENT:   Medications:gabapentin, Oxycodone, acetaminophen (Tylenol), ibuprofen  Non-phamacologic modalities: Physical Therapy    Previous interventions/surgeries: lumbar surgery     [place] Board of Pharmacy Data Base Reviewed:    YES; As expected, no concern for misuse/abuse of controlled medications based on this report. Noted for Gabapentin, Dilaudid 2 mg last refill 9/29for #90 tablet(s)   OPIOID RISK RFUML=750            D.I.R.E Score: Patient Selection for Chronic Opioid Analgesia    For each factor, rate the patient's score from 1 - 3 based on the explanations on the right.       Diagnosis             3         1 = Benign chronic condition with minimal objective findings or no definite medical diagnosis.  Examples:  fibromyalgia, migraine, headaches, non-specific back pain.  2 = Slowly progressive condition concordant with moderate pain, or fixed condition with moderate objective findings.  Examples: failed back surgery syndrome, back pain with moderate degenerative changes, neuropathic pain.  3 = Advanced condition concordant  with severe pain with objective findings.  Examples: severe ischemic vascular disease, advanced neuropathy, severe spinal stenosis.    Intractability             2         1 = Few therapies have been tried and the patient takes a passive role in his/her pain management process.   2 = Most costomary treatments have been tried but the patient is not fully engaged in the pain management process, or barriers prevent (insurance, transportation, medical illness)  3 = Patient fully engaged in a spectrum of appropriate treatments but with inadequate response.    Risk   (Risk = Total of P+C+R+S below)       Psychological             2         1 = Serious personality dysfunction or mental illness interfering with care.  Examples: personality disorder, severe affective disorder, significant personality issues.  2 = Personality or mental health interferes moderately.  Example: depression or anxiety disorder.  3 = Good communication with the clinic.  No significant personality dysfunction or mental illness.       Chemical      Health             3         1 = Active or very recent use of illicit drugs, excessive alcohol, or prescription drug abuse.  2 = Chemical coper (uses medications to cope with stress) or history of chemical dependency in remission.  3 = No CD history.  Not drug-focused or chemically reliant       Reliability             2         1 = History of numerous problems: medication misuse, missed appointments, rarely follows through.  2 = Occasional difficulties with compliance, but generally reliable.  3 = Highly reliable patient with medications, appointments and treatment.       Social      Support             2         1= Life in chaos.  Little family support and few close relationships.  Loss of most normal life roles.  2 = Reduction in some relationships and life roles.  3 = Supportive family/close relationships.  Involved in work or school and no social isolation.    Efficacy score             1         1 =  Poor function or minimal pain relief despite moderate to high doses.  2 = Moderate benefit with function improved in a number of ways (or insufficient info - hasn't tried opioid yet or very low doses or too short a trial.  3 = Good improvement in pain and function and quality of life with stable doses over time.                                    15    Total score = D + I + R + E    Score 7-13: Not a suitable candidate for long-term opioid analgesia  Score 14-21: May be a good candidate for long-term opioid analgesia    Copyright 2013 Nick Spann MD, The DIRE Score: Predicting Outcomes of Opioid Prescribing for Chronic Pain. The Journal of Pain. 7(9) (September), 2006:671-681    Past Medical History   I have reviewed this patient's medical history and updated it with pertinent information if needed.   Past Medical History:   Diagnosis Date     Anemia      Anxiety      Arthritis      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 4/16/2014     Depression      h/o Clostridium difficile colitis 06/2019     History of blood transfusion      Hypertension      Kidney stone      Lumbar compression fracture (H)      Macular degeneration (senile) of retina, unspecified      Mild cognitive impairment      Mumps      Other chronic pain     low back     Recurrent UTI      Upper GI bleed 06/2019     Urinary tract infection due to extended-spectrum beta lactamase (ESBL)-producing Klebsiella 05/2019    resistant to Bactrim       Past Surgical History   I have reviewed this patient's surgical history and updated it with pertinent information if needed.  Past Surgical History:   Procedure Laterality Date     Cataract removal NOS Bilateral      COMBINED CYSTOSCOPY, INSERT STENT URETER(S) Left 5/6/2020    Procedure: Cystoscopy with left ureteral stent insertion;  Surgeon: Gaurav Munson MD;  Location: [facility] OR     CYSTOSCOPY N/A 8/1/2019    Procedure: Exam under anesthesia, video cystopanendoscopy;  Surgeon: Dennis Carlson,  MD;  Location: [facility] OR     ESOPHAGOSCOPY, GASTROSCOPY, DUODENOSCOPY (EGD), COMBINED N/A 6/11/2019    Procedure: ESOPHAGOGASTRODUODENOSCOPY (EGD);  Surgeon: Gaurav Degroot MD;  Location: [facility] GI     EXTRACORPOREAL SHOCK WAVE LITHOTRIPSY (ESWL) Left 5/28/2020    Procedure: LEFT EXTRACORPOREAL SHOCK WAVE LITHOTRIPSY;  Surgeon: Gaurav Munson MD;  Location: [facility] OR     Hip Pinning procedure Left      Hip replacement NOS Right      LASER HOLMIUM LITHOTRIPSY URETER(S), INSERT STENT, COMBINED Left 10/30/2020    Procedure: Cystoscopy, left ureteral stent exchange, left retrograde pyelogram, interpretation of fluoroscopic images, left ureteroscopy with holmium lithotripsy and stone basketing;  Surgeon: Gaurav Munson MD;  Location: [facility] OR     OPEN REDUCTION INTERNAL FIXATION TIBIAL PLATEAU Right 8/28/2019    Procedure: Open reduction internal fixation right proximal tibia fracture;  Surgeon: Molina Pardo MD;  Location:  OR     TONSILLECTOMY, ADENOIDECTOMY, COMBINED           Prior to Admission Medications   Prior to Admission Medications   Prescriptions Last Dose Informant Patient Reported? Taking?   Calcium Carbonate (CALCIUM 600 PO) 10/5/2021 at Unknown time  Yes Yes   Sig: Take 1 tablet by mouth 2 times daily    Cholecalciferol (VITAMIN D3) 2000 UNITS CAPS 10/5/2021 at Unknown time  Yes Yes   Sig: Take 2,000 Units by mouth daily    HYDROmorphone (DILAUDID) 2 MG tablet   No Yes   Sig: Take 1 tablet (2 mg) by mouth every 4 hours as needed for moderate to severe pain   HYDROmorphone (DILAUDID) 2 MG tablet 10/5/2021 at Unknown time  Yes Yes   Sig: Take 2 mg by mouth 2 times daily   Lidocaine (LIDOCARE) 4 % Patch 10/5/2021 at Unknown time  No Yes   Sig: Place 2 patches onto the skin every 24 hours To prevent lidocaine toxicity, patient should be patch free for 12 hrs daily. Apply over spine.   Multiple Vitamins-Minerals (PRESERVISION AREDS) CAPS 10/5/2021 at Unknown time  Yes Yes   Sig: Take 1  capsule by mouth 2 times daily    SENNA-docusate sodium (SENNA S) 8.6-50 MG tablet 10/5/2021 at Unknown time  No Yes   Sig: Take 1 tablet by mouth At Bedtime   Patient taking differently: Take 1 tablet by mouth daily    acetaminophen (TYLENOL) 500 MG tablet 10/5/2021 at Unknown time  Yes Yes   Sig: Take 1,000 mg by mouth 3 times daily    acetaminophen (TYLENOL) 500 MG tablet   No Yes   Sig: Take 2 tablets (1,000 mg) by mouth daily as needed for mild pain   amLODIPine (NORVASC) 10 MG tablet 10/5/2021 at Unknown time  Yes Yes   Sig: Take 10 mg by mouth daily   carvedilol (COREG) 25 MG tablet 10/5/2021 at Unknown time  Yes Yes   Sig: Take 12.5 mg by mouth every morning    carvedilol (COREG) 25 MG tablet 10/5/2021 at Unknown time  Yes Yes   Sig: Take 25 mg by mouth At Bedtime   cloNIDine (CATAPRES) 0.1 MG tablet 10/5/2021 at Unknown time  Yes Yes   Sig: Take 0.1 mg by mouth At Bedtime    diclofenac (VOLTAREN) 1 % topical gel   Yes Yes   Sig: Place 2 g onto the skin 3 times daily as needed for moderate pain Apply to right knee   gabapentin (NEURONTIN) 100 MG capsule 10/5/2021 at Unknown time  No Yes   Sig: Take 3 capsules (300 mg) by mouth 2 times daily   hydrOXYzine (ATARAX) 25 MG tablet 10/6/2021 at 1300  No Yes   Sig: Take 1 tablet (25 mg) by mouth every 8 hours as needed for itching   isosorbide dinitrate (ISORDIL) 10 MG tablet 10/5/2021 at Unknown time  No Yes   Sig: Take 1 tablet (10 mg) by mouth 3 times daily   lisinopril (ZESTRIL) 30 MG tablet 10/5/2021 at Unknown time  Yes Yes   Sig: Take 30 mg by mouth daily   melatonin 1 MG TABS tablet   No Yes   Sig: Take 1 tablet (1 mg) by mouth nightly as needed for sleep   menthol-zinc oxide (CALMOSEPTINE) 0.44-20.6 % OINT ointment 10/5/2021 at Unknown time  Yes Yes   Sig: Apply topically 2 times daily Apply to abrasion on left lower back until healed    omeprazole (PRILOSEC) 20 MG DR capsule 10/5/2021 at Unknown time  Yes Yes   Sig: Take 20 mg by mouth daily     polyethylene glycol (MIRALAX/GLYCOLAX) packet   No Yes   Sig: Take 17 g by mouth daily as needed for constipation   potassium chloride ER (KLOR-CON M) 10 MEQ CR tablet 10/5/2021 at Unknown time  Yes Yes   Sig: Take 10 mEq by mouth daily   sertraline (ZOLOFT) 100 MG tablet 10/5/2021 at Unknown time  Yes Yes   Sig: Take 100 mg by mouth daily   vitamin C (ASCORBIC ACID) 500 MG tablet 10/5/2021 at Unknown time  Yes Yes   Sig: Take 500 mg by mouth daily       Facility-Administered Medications: None     Allergies   Allergies   Allergen Reactions     Atorvastatin Muscle Pain (Myalgia)     Macrobid [Nitrofurantoin Anhydrous] Other (See Comments)     Body aches     Nitrofurantoin Muscle Pain (Myalgia) and Unknown     Body aches       Penicillins Unknown     Seasonal Allergies Unknown     Sulfa Drugs Other (See Comments)     Tolerated Bactrim May 2019  Entire family has allergy to Sulfa     Sulfasalazine Unknown       Social History   I have reviewed this patient's social history and updated it with pertinent information if needed. Daya Ignacio  reports that she has quit smoking. Her smoking use included cigarettes. She has never used smokeless tobacco. She reports that she does not drink alcohol and does not use drugs.    Family History   I have reviewed this patient's family history and updated it with pertinent information if needed.   Family History   Problem Relation Age of Onset     Alzheimer Disease Mother         mild     Cardiovascular Father         heart attack     Neurologic Disorder Brother 83        Parkinson's     Family history of addiction  None     Review of Systems   The 10 point Review of Systems is negative other than noted in the HPI or here.     Denies Bowel or bladder dysfunction    Physical Exam   Temp:  [97.9  F (36.6  C)-99  F (37.2  C)] 98.1  F (36.7  C)  Pulse:  [65-75] 71  Resp:  [16-18] 16  BP: (100-163)/(36-69) 123/51  SpO2:  [92 %-99 %] 96 %  115 lbs 8 oz  GEN:  Alert, oriented x 3,  appears comfortable, No apparent distress.  HEENT:  Normocephalic/atraumatic, no scleral icterus, no nasal discharge, mouth moist.  CV:  RRR, S1, S2; no murmurs or other irregularities noted.  +3 DP/PT pulses bilaterally; no edema bilateral lower extremeties.  RESP:  Clear to auscultation bilaterally without rales/rhonchi/wheezing/retractions.  Symmetric chest rise on inhalation noted.  Normal respiratory effort.  ABD:  Rounded, soft, non-tender/non-distended.  +BS  EXT:  Edema & pulses as noted above.  Color, moisture and sensation intact x 4.     M/S:   Nontender to palpation  Through out weak strength testing of quadraceps,.  Dorsi/plantar flexion intact. Pain at 40 degrees with SLR   SKIN:  Dry to touch, no exanthems noted in the visualized areas.    NEURO: Symmetric strength +5/5.  Sensation to touch intact all extremities.   There is no area of allodynia or hyperesthesia.  PAIN BEHAVIOR: Cooperative  Psych:  Normal affect.  Calm, cooperative, conversant appropriately.       Data   I personally reviewed no images or EKG's today.

## 2021-10-08 NOTE — PLAN OF CARE
Vitals:BP (!) 159/73 (BP Location: Right arm)   Pulse 74   Temp 98.3  F (36.8  C) (Oral)   Resp 16   Wt 52.4 kg (115 lb 8 oz)   SpO2 97%   BMI 21.82 kg/m    Labs:Hgb 6.8, transfused with 1 unit, recheck CBC in AM  Cardiac:WDL   Resp:WDL, 2L NC - needing with narcotics   Neuro: AxO 2-4, more confused towards the evening, CMS intact  GI:WDL   :Voided once in the evening, needed straight cath overnight   Skin:  Activity:Tiff Steady to chair and commode today, pt complained of pain both times, but she was able to stand with mod assist   Diet:Reg  Pain:8-9/10, reports her back pain is usually 5/10   Plan:Pain team consulted, gabapentin and hydroxyzine added, volteran, lido patch, scheduled tylenol, and PRN dilaudid. Accepted at [facility], recheck CBC in AM after 1 unit transfused

## 2021-10-08 NOTE — PLAN OF CARE
INPATIENT NOTE [id number]    BP (!) 102/36 (BP Location: Right arm)   Pulse 67   Temp 99  F (37.2  C) (Oral)   Resp 18   Wt 52.4 kg (115 lb 8 oz)   SpO2 98%   BMI 21.82 kg/m      Pt is alert and disoriented to time and situation, not OOB during shift - minimal cooperation repositioning d/t pain, Pain rated 6-9/10 throughout shift - voltaren, scheduled tylenol, lidocaine patch, dilaudid PRN and atarax PRN all utilized to achieve pt reported relief of 6/10. Contact/Enteric precautions maintained. Pt Unable to perform IS successfully. O2 - 2L via nasal cannula. Replaced purewick - brief changes incredibly painful for pt, due to void and needs frequent reminders. Hypotensive - carvidelol held. Plan to discharge to TCU. PT and SW following. Continued monitoring and supportive cares.    Diallo Gan RN on 10/7/2021

## 2021-10-08 NOTE — PROVIDER NOTIFICATION
DATE:  10/8/2021   TIME OF RECEIPT FROM LAB: 0845  LAB TEST:  Hgb  LAB VALUE:  6.8  RESULTS GIVEN WITH READ-BACK TO (PROVIDER):  Dr. Mckeon   TIME LAB VALUE REPORTED TO PROVIDER:   0845

## 2021-10-09 ENCOUNTER — LAB REQUISITION (OUTPATIENT)
Dept: LAB | Facility: CLINIC | Age: 86
End: 2021-10-09
Payer: COMMERCIAL

## 2021-10-09 VITALS
RESPIRATION RATE: 16 BRPM | OXYGEN SATURATION: 92 % | HEART RATE: 76 BPM | SYSTOLIC BLOOD PRESSURE: 147 MMHG | TEMPERATURE: 97.9 F | WEIGHT: 115.5 LBS | BODY MASS INDEX: 21.82 KG/M2 | DIASTOLIC BLOOD PRESSURE: 61 MMHG

## 2021-10-09 DIAGNOSIS — Z11.1 ENCOUNTER FOR SCREENING FOR RESPIRATORY TUBERCULOSIS: ICD-10-CM

## 2021-10-09 LAB
ANION GAP SERPL CALCULATED.3IONS-SCNC: 3 MMOL/L (ref 3–14)
BASOPHILS # BLD AUTO: 0 10E3/UL (ref 0–0.2)
BASOPHILS NFR BLD AUTO: 0 %
BUN SERPL-MCNC: 22 MG/DL (ref 7–30)
CALCIUM SERPL-MCNC: 8.6 MG/DL (ref 8.5–10.1)
CHLORIDE BLD-SCNC: 110 MMOL/L (ref 94–109)
CO2 SERPL-SCNC: 25 MMOL/L (ref 20–32)
CREAT SERPL-MCNC: 0.56 MG/DL (ref 0.52–1.04)
EOSINOPHIL # BLD AUTO: 0.2 10E3/UL (ref 0–0.7)
EOSINOPHIL NFR BLD AUTO: 4 %
ERYTHROCYTE [DISTWIDTH] IN BLOOD BY AUTOMATED COUNT: 17.4 % (ref 10–15)
GFR SERPL CREATININE-BSD FRML MDRD: 82 ML/MIN/1.73M2
GLUCOSE BLD-MCNC: 92 MG/DL (ref 70–99)
HCT VFR BLD AUTO: 26.1 % (ref 35–47)
HGB BLD-MCNC: 8.4 G/DL (ref 11.7–15.7)
IMM GRANULOCYTES # BLD: 0.1 10E3/UL
IMM GRANULOCYTES NFR BLD: 1 %
LYMPHOCYTES # BLD AUTO: 0.8 10E3/UL (ref 0.8–5.3)
LYMPHOCYTES NFR BLD AUTO: 14 %
MCH RBC QN AUTO: 33.2 PG (ref 26.5–33)
MCHC RBC AUTO-ENTMCNC: 32.2 G/DL (ref 31.5–36.5)
MCV RBC AUTO: 103 FL (ref 78–100)
MONOCYTES # BLD AUTO: 0.4 10E3/UL (ref 0–1.3)
MONOCYTES NFR BLD AUTO: 7 %
NEUTROPHILS # BLD AUTO: 4.4 10E3/UL (ref 1.6–8.3)
NEUTROPHILS NFR BLD AUTO: 74 %
NRBC # BLD AUTO: 0 10E3/UL
NRBC BLD AUTO-RTO: 0 /100
PLATELET # BLD AUTO: 111 10E3/UL (ref 150–450)
POTASSIUM BLD-SCNC: 4.3 MMOL/L (ref 3.4–5.3)
RBC # BLD AUTO: 2.53 10E6/UL (ref 3.8–5.2)
SODIUM SERPL-SCNC: 138 MMOL/L (ref 133–144)
WBC # BLD AUTO: 5.9 10E3/UL (ref 4–11)

## 2021-10-09 PROCEDURE — 36415 COLL VENOUS BLD VENIPUNCTURE: CPT | Performed by: HOSPITALIST

## 2021-10-09 PROCEDURE — 250N000013 HC RX MED GY IP 250 OP 250 PS 637: Performed by: PHYSICIAN ASSISTANT

## 2021-10-09 PROCEDURE — 80048 BASIC METABOLIC PNL TOTAL CA: CPT | Performed by: HOSPITALIST

## 2021-10-09 PROCEDURE — 85025 COMPLETE CBC W/AUTO DIFF WBC: CPT | Performed by: HOSPITALIST

## 2021-10-09 PROCEDURE — 250N000013 HC RX MED GY IP 250 OP 250 PS 637: Performed by: INTERNAL MEDICINE

## 2021-10-09 PROCEDURE — 250N000013 HC RX MED GY IP 250 OP 250 PS 637: Performed by: NURSE PRACTITIONER

## 2021-10-09 RX ORDER — CLONIDINE HYDROCHLORIDE 0.1 MG/1
0.1 TABLET ORAL EVERY 6 HOURS PRN
Status: DISCONTINUED | OUTPATIENT
Start: 2021-10-09 | End: 2021-10-09 | Stop reason: HOSPADM

## 2021-10-09 RX ORDER — HYDROXYZINE HYDROCHLORIDE 10 MG/1
10 TABLET, FILM COATED ORAL EVERY 8 HOURS
Qty: 21 TABLET | Refills: 0 | Status: ON HOLD | OUTPATIENT
Start: 2021-10-09 | End: 2023-01-23

## 2021-10-09 RX ORDER — LIDOCAINE 4 G/G
2 PATCH TOPICAL EVERY 24 HOURS
Qty: 14 PATCH | Refills: 0 | Status: SHIPPED | OUTPATIENT
Start: 2021-10-09 | End: 2024-05-02

## 2021-10-09 RX ORDER — HYDROMORPHONE HYDROCHLORIDE 2 MG/1
2 TABLET ORAL EVERY 6 HOURS PRN
Qty: 8 TABLET | Refills: 0 | Status: ON HOLD | OUTPATIENT
Start: 2021-10-09 | End: 2023-01-23

## 2021-10-09 RX ORDER — NALOXONE HYDROCHLORIDE 0.4 MG/ML
0.2 INJECTION, SOLUTION INTRAMUSCULAR; INTRAVENOUS; SUBCUTANEOUS ONCE
Qty: 0.5 ML | Refills: 0 | Status: ON HOLD | OUTPATIENT
Start: 2021-10-09 | End: 2023-01-23

## 2021-10-09 RX ORDER — GABAPENTIN 100 MG/1
200 CAPSULE ORAL 3 TIMES DAILY
Qty: 21 CAPSULE | Refills: 1 | Status: ON HOLD | OUTPATIENT
Start: 2021-10-09 | End: 2023-01-23

## 2021-10-09 RX ADMIN — ISOSORBIDE DINITRATE 10 MG: 10 TABLET ORAL at 13:53

## 2021-10-09 RX ADMIN — OXYCODONE HYDROCHLORIDE AND ACETAMINOPHEN 500 MG: 500 TABLET ORAL at 08:53

## 2021-10-09 RX ADMIN — GABAPENTIN 200 MG: 100 CAPSULE ORAL at 08:44

## 2021-10-09 RX ADMIN — HYDROXYZINE HYDROCHLORIDE 10 MG: 10 TABLET, FILM COATED ORAL at 08:44

## 2021-10-09 RX ADMIN — AMLODIPINE BESYLATE 10 MG: 10 TABLET ORAL at 14:00

## 2021-10-09 RX ADMIN — POTASSIUM CHLORIDE 10 MEQ: 750 TABLET, FILM COATED, EXTENDED RELEASE ORAL at 08:45

## 2021-10-09 RX ADMIN — DICLOFENAC SODIUM 4 G: 10 GEL TOPICAL at 17:16

## 2021-10-09 RX ADMIN — OMEPRAZOLE 20 MG: 20 CAPSULE, DELAYED RELEASE ORAL at 08:45

## 2021-10-09 RX ADMIN — Medication 50 MCG: at 08:44

## 2021-10-09 RX ADMIN — HYDROXYZINE HYDROCHLORIDE 10 MG: 10 TABLET, FILM COATED ORAL at 17:13

## 2021-10-09 RX ADMIN — SERTRALINE HYDROCHLORIDE 100 MG: 100 TABLET ORAL at 08:45

## 2021-10-09 RX ADMIN — DICLOFENAC SODIUM 4 G: 10 GEL TOPICAL at 13:55

## 2021-10-09 RX ADMIN — CARVEDILOL 12.5 MG: 12.5 TABLET, FILM COATED ORAL at 08:45

## 2021-10-09 RX ADMIN — DICLOFENAC SODIUM 4 G: 10 GEL TOPICAL at 08:38

## 2021-10-09 RX ADMIN — ISOSORBIDE DINITRATE 10 MG: 10 TABLET ORAL at 17:14

## 2021-10-09 RX ADMIN — LISINOPRIL 30 MG: 20 TABLET ORAL at 08:45

## 2021-10-09 RX ADMIN — ACETAMINOPHEN 1000 MG: 500 TABLET, FILM COATED ORAL at 08:44

## 2021-10-09 RX ADMIN — HYDROMORPHONE HYDROCHLORIDE 2 MG: 2 TABLET ORAL at 11:01

## 2021-10-09 RX ADMIN — ACETAMINOPHEN 1000 MG: 500 TABLET, FILM COATED ORAL at 13:53

## 2021-10-09 RX ADMIN — GABAPENTIN 200 MG: 100 CAPSULE ORAL at 13:53

## 2021-10-09 NOTE — DISCHARGE SUMMARY
EXAM:  Two views of the abdomen. 

  

History:  Rectal bleeding. 

  

Comparison:  KUB 03/22/2018 

  

Findings:  Nonspecific but nonobstructive bowel gas pattern.  No free intraperitoneal air.  Scattered
 colonic stool.  No acute osseous abnormalities and no suspicious calcifications. Prominent contour o
f the right hepatic lobe. 

  

Impression:  No acute radiographic findings within the abdomen.  If symptoms persist, consider correl
ation with contrast enhanced CT. [facility]    Discharge Summary  Hospitalist    Date of Admission:  10/6/2021  Date of Discharge:  10/9/2021  Discharging Provider:  BEAU Santo MD, FACP     Date of Service (when I saw the patient): 10/09/21    Discharge Diagnoses      Fall with Left inferior/superior pubic ramus fractures  Low back pain, acute on chronic  Abdominal pain with nausea, diarrhea  AYAKA  Thrombocytopenia  Hx of HTN with brief hypotension  Anemia, acute on chronic      History of Present Illness   Per Admit H & P, amended:  Daya Ignacio is a pleasant 89 year old woman with previous multilevel compression fractures, ESBL UTIs, C. diff, HTN, cognitive impairment with depression/anxiety, HTN, and dyslipidemia - who was admitted 10/6/2021 after a [facility] and was found to have Left superior and inferior pubic ramus fractures with mild displacement of the superior pubic ramus fracture.  In the ED, her SpO2 dipped after PTA narcotic use, requiring 2L supplemental O2.  VS otherwise stable.   No definitive fractures noted on lumbar XR.    Hospital Course   Daya Ignacio was admitted on 10/6/2021.  The following problems were addressed during her hospitalization:    Mechanical fall with L inf/sup pubic ramus fractures; stable and pain better controlled on day of discharge.  - was ambulating with caregiver at assisted living  - in setting of chronic back pain with previous vertebral/pelvic fracture  - continued scheduled Tylenol, lidocaine patches, home PRN oral dilaudid  - other analgesics as below in discharge med list  - added diclofenac gel  - Pain team was consulted  - still needs great encouragement to get out of bed for walks, meals, etc.  - seen by PT --> recommended TCU after discharge     Nausea, abdominal pain, diarrhea; all improved/resolving on day of discharg  - stopped stool softeners  - unable to obtain sample for C.diff/enteric testing     Acute renal failure; likely pre-renal  -  resolved  - resumed lisinopril     Acute on chronic anemia; stable.  - baseline appears to be about 9-10  - 7.8>7.1>6.8; 8.4 on day of discharge  - no signs of bleeding (stools light brown)  - transfused 1 unit     Thrombocytopenia; stable  - further follow up after discharge     Hypotension (in the setting of baseline HTN) on 10/7; resolved.  - held home meds: imdur, lisinopril, amlodipine and clonidine  - continued carvedilol   - other antihypertensives were resumed     Previous ESBL UTI  - no new Sx or fever suggestive of recurrent UTI  - UCx to be reviewed at TCU, when available     History CAD with STEMI in 5/2017; stable.  - continued carvedilol  - not on asa as outpt     Depression  - continued home sertraline     Chronic pain  - continued PRN dilaudid     GERD  - continued pta PPI        D. Abilio Santo MD, [facility] Hospitalist       Significant Results and Procedures   See EHR and results below.    Pending Results   Urine Cx from 10/7/21.    Unresulted Labs Ordered in the Past 30 Days of this Admission     No orders found from 9/6/2021 to 10/7/2021.          Code Status   DNR / DNI (per previous discussions with patient/son Gael).       Primary Care Physician   DINORAH DISLA    Physical Exam     Vitals:    10/06/21 2202   Weight: 52.4 kg (115 lb 8 oz)     10/09/21 1719 97.9  F (36.6  C) -- -- -- -- -- -- -- -- -- -- AL   10/09/21 1714 -- 76 -- -- 147/61Abnormal  16 92 % -- None (Room air) -- -- AL   10/09/21 1619 98.7  F (37.1  C) 69 -- -- 115/48 24 90 % --         Constitutional: awake, no apparent distress; lying in bed; tolerates sitting up with assistance without difficulty.  HEENT: sclerae clear; MM's moist  Respiratory: good a/e bilaterally, no wheezing or rhonchi  Back: no focal tenderness  Cardiovascular: Regular rate and rhythm, S1, S2 noted; no m/r/g  GI: abdomen flat, + bowel sounds; soft, non-tender, non-distended  Skin/Integumen: no rashes, no cyanosis, no  jaundice  Musculoskeletal: no edema; PCD's in place  Neurologic: follows directions well; no focal deficits     Discharge Disposition   Discharged to [facility]  Condition at discharge: Stable    Consultations This Hospital Stay   CARE MANAGEMENT / SOCIAL WORK IP CONSULT  CARE MANAGEMENT / SOCIAL WORK IP CONSULT  PHYSICAL THERAPY ADULT IP CONSULT  OCCUPATIONAL THERAPY ADULT IP CONSULT  PAIN MANAGEMENT ADULT IP CONSULT  NUTRITION SERVICES ADULT IP CONSULT  PHYSICAL THERAPY ADULT IP CONSULT  OCCUPATIONAL THERAPY ADULT IP CONSULT    Time Spent on this Encounter   I, Tanner Santo MD, personally saw the patient today and spent greater than 30 minutes discharging this patient.    Discharge Orders     Discharge Medications   Discharge Medication List as of 10/9/2021  5:20 PM      START taking these medications    Details   diclofenac (VOLTAREN) 1 % topical gel Apply 4 g topically 4 times daily, Disp-50 g, R-0, Local Print      naloxone (NARCAN) 0.4 MG/ML injection Inject 0.5 mLs (0.2 mg) into the vein once for 1 dose, Disp-0.5 mL, R-0, Local Print         CONTINUE these medications which have CHANGED    Details   gabapentin (NEURONTIN) 100 MG capsule Take 2 capsules (200 mg) by mouth 3 times daily, Disp-21 capsule, R-1, Local Print      HYDROmorphone (DILAUDID) 2 MG tablet Take 1 tablet (2 mg) by mouth every 6 hours as needed for moderate to severe pain, Disp-8 tablet, R-0, Local Print      hydrOXYzine (ATARAX) 10 MG tablet Take 1 tablet (10 mg) by mouth every 8 hours, Disp-21 tablet, R-0, Local Print      Lidocaine (LIDOCARE) 4 % Patch Place 2 patches onto the skin every 24 hours To prevent lidocaine toxicity, patient should be patch free for 12 hrs daily.Disp-14 patch, R-0Local Print         CONTINUE these medications which have NOT CHANGED    Details   !! acetaminophen (TYLENOL) 500 MG tablet Take 2 tablets (1,000 mg) by mouth daily as needed for mild pain, TransitionalDo not exceed 4000 mg per 24 hrs       !! acetaminophen (TYLENOL) 500 MG tablet Take 1,000 mg by mouth 3 times daily , Historical      amLODIPine (NORVASC) 10 MG tablet Take 10 mg by mouth daily, Historical      Calcium Carbonate (CALCIUM 600 PO) Take 1 tablet by mouth 2 times daily , Historical      !! carvedilol (COREG) 25 MG tablet Take 25 mg by mouth At Bedtime, Historical      !! carvedilol (COREG) 25 MG tablet Take 12.5 mg by mouth every morning , Historical      Cholecalciferol (VITAMIN D3) 2000 UNITS CAPS Take 2,000 Units by mouth daily , Historical      cloNIDine (CATAPRES) 0.1 MG tablet Take 0.1 mg by mouth At Bedtime , Historical      diclofenac (VOLTAREN) 1 % topical gel Place 2 g onto the skin 3 times daily as needed for moderate pain Apply to right knee, Historical      isosorbide dinitrate (ISORDIL) 10 MG tablet Take 1 tablet (10 mg) by mouth 3 times daily, Disp-270 tablet, R-3, E-Prescribe      lisinopril (ZESTRIL) 30 MG tablet Take 30 mg by mouth daily, Historical      melatonin 1 MG TABS tablet Take 1 tablet (1 mg) by mouth nightly as needed for sleep, Transitional      Multiple Vitamins-Minerals (PRESERVISION AREDS) CAPS Take 1 capsule by mouth 2 times daily , Historical      omeprazole (PRILOSEC) 20 MG DR capsule Take 20 mg by mouth daily , Historical      polyethylene glycol (MIRALAX/GLYCOLAX) packet Take 17 g by mouth daily as needed for constipation, Local Print      SENNA-docusate sodium (SENNA S) 8.6-50 MG tablet Take 1 tablet by mouth At Bedtime, No Print Out      sertraline (ZOLOFT) 100 MG tablet Take 100 mg by mouth daily, Historical      vitamin C (ASCORBIC ACID) 500 MG tablet Take 500 mg by mouth daily , Historical       !! - Potential duplicate medications found. Please discuss with provider.      STOP taking these medications       menthol-zinc oxide (CALMOSEPTINE) 0.44-20.6 % OINT ointment Comments:   Reason for Stopping:         potassium chloride ER (KLOR-CON M) 10 MEQ CR tablet Comments:   Reason for Stopping:              Allergies   Allergies   Allergen Reactions     Atorvastatin Muscle Pain (Myalgia)     Macrobid [Nitrofurantoin Anhydrous] Other (See Comments)     Body aches     Nitrofurantoin Muscle Pain (Myalgia) and Unknown     Body aches       Penicillins Unknown     Seasonal Allergies Unknown     Sulfa Drugs Other (See Comments)     Tolerated Bactrim May 2019  Entire family has allergy to Sulfa     Sulfasalazine Unknown     Data   Most Recent 3 CBC's:  Recent Labs   Lab Test 10/14/21  0517 10/09/21  0532 10/08/21  0649   WBC 7.2 5.9 7.3   HGB 9.0* 8.4* 6.8*    103* 108*    111* 109*      Most Recent 3 BMP's:  Recent Labs   Lab Test 10/19/21  0932 10/18/21  0612 10/09/21  0532    137 138   POTASSIUM 4.2 4.3 4.3   CHLORIDE 104 104 110*   CO2 28 27 25   BUN 27 23 22   CR 0.62 0.62 0.56   ANIONGAP 4* 6 3   DAVE 9.5 9.6 8.6   GLC 85 82 92     Most Recent 2 LFT's:  Recent Labs   Lab Test 11/03/20  0511 05/06/20  0928   AST 30 15   ALT 22 12   ALKPHOS 144 113   BILITOTAL 0.6 1.3     Most Recent INR's and Anticoagulation Dosing History:  Anticoagulation Dose History     Recent Dosing and Labs Latest Ref Rng & Units 6/24/2011 6/25/2011 6/26/2011 6/27/2011 6/20/2017 8/28/2019 5/6/2020    INR 0.86 - 1.14 1.53(H) 1.40(H) 1.30(H) 1.21(H) 1.18(H) 1.18(H) 1.13        Most Recent 3 Troponin's:  Recent Labs   Lab Test 07/22/19  1014 07/13/19  1309 05/31/19  1040   TROPI <0.015 <0.015 <0.015     Most Recent Cholesterol Panel:  Recent Labs   Lab Test 02/19/19  1022   CHOL 176   *   HDL 49*   TRIG 95     Most Recent 6 Bacteria Isolates From Any Culture (See EPIC Reports for Culture Details):  Recent Labs   Lab Test 05/11/21  1613 05/11/21  1607 05/09/21  0120 11/02/20  0819 11/02/20  0817 10/31/20  1130   CULT No growth No growth >100,000 colonies/mL  Aerococcus urinae  Identification obtained by MALDI-TOF mass spectrometry research use only database. Test   characteristics determined and verified by the  [facility]   ([facility]) [place], [place].  Aerococcus is usually susceptible to penicillin, cephalosporins, tetracycline and   vancomycin.  *  >100,000 colonies/mL  Escherichia coli ESBL  ESBL (extended beta lactamase) producing organisms require contact precautions.  * No growth No growth <10,000 colonies/mL  Proteus mirabilis  *  <10,000 colonies/mL  Enterococcus faecium  *  <10,000 colonies/mL  Strain 2  Enterococcus faecium  *  <10,000 colonies/mL  Candida glabrata complex  Susceptibility testing not routinely done  *  ID requested by Dr. Camejo on 11.2.20 at 1311. bw     Most Recent TSH, T4 and A1c Labs:  Recent Labs   Lab Test 12/10/19  0848 01/13/17  1020   TSH 0.79  --    A1C  --  5.3     Results for orders placed or performed during the hospital encounter of 10/06/21   XR Pelvis 1/2 Views    Narrative    EXAM: XR PELVIS 1/2 VW  LOCATION: [facility]  DATE/TIME: 10/6/2021 5:58 PM    INDICATION: Fall, pain.    COMPARISON: None.    FINDINGS: Osteopenia. Right hip arthroplasty. Plate-screw fixation of the left proximal femur.      Impression    IMPRESSION:  1. Fracture of the left superior pubic ramus with mild displacement.  2. Fracture of the left inferior pubic ramus.  3. Fracture deformity of the right pubis medially which may well be old.     XR Lumbar Spine 2/3 Views    Narrative    EXAM: XR LUMBAR SPINE 2-3 VIEWS  LOCATION: [facility]  DATE/TIME: 10/6/2021 5:58 PM    INDICATION: fall, pain  COMPARISON: 08/16/2021  TECHNIQUE: CR Lumbar Spine.      Impression    IMPRESSION: Diffuse osseous demineralization significant caliber is evaluation for a subtle fracture. If there is clinical concern, recommend further characterization with CT.    No significant change in chronic compression deformities of the T11-L3 vertebral bodies. Remaining vertebral body heights are maintained. Moderate to advanced degenerative disc disease at L4-L5 and  L5-S1. Lower lumbar facet arthropathy. No acute   extraspinal abnormality.   XR Chest Port 1 View    Narrative    EXAM: XR CHEST PORT 1 VIEW  LOCATION: [facility]  DATE/TIME: 10/6/2021 8:22 PM    INDICATION: hypoxia  COMPARISON: 10/31/2020      Impression    IMPRESSION: No focal infiltrate, pleural effusion or pneumothorax. The cardiac and mediastinal silhouettes are normal.

## 2021-10-09 NOTE — PLAN OF CARE
Admit Date:  10/06/2021  Admitting Diagnosis: fall, dx pubic ramis  Pertinent History:      Isolation Precautions:   Patient is on Contact precuations for MRSA and ESBL.    Neuro: Alert to and Self  Activity: are on bedrest  Telemetry Monitoring: No  Pain: Complaining of 6/10 pain in their lower and midback. Tylenol, lido patches, voltaren cream and Dilaudid given for pain.  Medicatons appear to be helping,patient resting with eyes closed  /47 (BP Location: Left arm)   Pulse 89   Temp 97.8  F (36.6  C) (Oral)   Resp 20   Wt 52.4 kg (115 lb 8 oz)   SpO2 92%   BMI 21.82 kg/m    Labs / Tests:   GI:  :  Medications:  Misc:  LDA's: Peripheral  Fluids: is Saline locked.  Diet: Regular  Living Situation:   Consults: PT and Pain Team  Discharge Disposition: Transitional Care Unit     Plan of Care:  PT to see and assess pt needs, may need TCU.  Bedrest at this time

## 2021-10-09 NOTE — PLAN OF CARE
Vitals:BP (!) 147/61 (BP Location: Left arm)   Pulse 76   Temp 97.9  F (36.6  C) (Oral)   Resp 16   Wt 52.4 kg (115 lb 8 oz)   SpO2 92%   BMI 21.82 kg/m    Labs:Hgb 8.4  Cardiac:WBC  Resp:WDL, lung CTA, needing oxygen occasionally - likely pain med related   Neuro:disoriented to time and situation - thinks it's September and she broke hip  GI:WDL  :Incontinent in purewick  Skin:WDL, old bruise on right flank  Activity:Tiff Steady with 2   Diet:reg  Pain:8-9/10, improved after oral dilaudid  Plan:Discharge to TCU

## 2021-10-09 NOTE — PLAN OF CARE
Care from Gem Rodriguez RN     Temp: 97.8  F (36.6  C)  Temp src: Oral  BP: 120/47 (map 92)  Pulse: 89  Resp: 20  SpO2: 92 %  O2 Device: Nasal cannula  2L/[place]    Admit Date:  10/06/2021  Admitting Diagnosis: fall, dx pubic ramis  Pertinent History:     Isolation Precautions:   Patient is on Contact precuations for MRSA and ESBL.    Neuro: Alert to and Self  Activity: are on bedrest  Telemetry Monitoring: No  Pain: complaining of 6/10 pain in their lower and midback.  Tylenol, lido patches, voltaren cream and Dilaudid given for pain.  Medicatons appear to be helping,patient resting with eyes closed  Labs / Tests:   GI:  :  Medications:  Misc:  LDA's: Peripheral  Fluids: is Saline locked.  Diet: Regular  Living Situation:   Consults: PT and Pain Team  Discharge Disposition: Transitional Care Unit    Plan of Care:  PT to see and assess pt needs, may need TCU.  Bedrest at this time.

## 2021-10-09 NOTE — PROGRESS NOTES
Care Management Discharge Note    Discharge Date: 10/09/2021    Discharge Disposition: Transitional Care    Discharge Services: None    Discharge DME: None    Discharge Transportation:  [facility] transport today at 1730    Private pay costs discussed: transportation costs    PAS Confirmation Code: [id number]  Patient/family educated on Medicare website which has current facility and service quality ratings: yes    Education Provided on the Discharge Plan:  yes  Persons Notified of Discharge Plans: Called and updated jose Mayo via phone,   Patient/Family in Agreement with the Plan: yes    Handoff Referral Completed: Yes    Additional Information:  Per discussion with provider, [facility] Pt continues to require assist of 2 staff, will arrange for WC transport. [facility] arranged for 1730 today. Updated TCU, bedside RN, jose Mayo/pt.     Will need to complete and send discharge order by 1400. Hard copies of controlled scripts need to be hand signed and faxed to [facility] at [phone].     Shea Starr RN BSN   Inpatient Care Coordination  [facility]   Phone [phone]

## 2021-10-09 NOTE — PROGRESS NOTES
Patient's After Visit Summary faxed and sent to TCU  Discharge medications sent home with patient/family: Scripts faxed and sent to TCU   Discharged with other:[facility]

## 2021-10-10 NOTE — PLAN OF CARE
Physical Therapy Discharge Summary     Reason for therapy discharge:    Discharged to transitional care facility.     Progress towards therapy goal(s). See goals on Care Plan in [facility] electronic health record for goal details.  Goals not met.  Barriers to achieving goals:   discharge from facility.     Therapy recommendation(s):    Continued therapy is recommended.  Rationale/Recommendations:  Patient would benefit from PT eval at TCU in order to increase strength, activity tolerance, balance and independence with mobility.

## 2021-10-11 PROCEDURE — 86481 TB AG RESPONSE T-CELL SUSP: CPT | Mod: ORL | Performed by: GENERAL PRACTICE

## 2021-10-11 PROCEDURE — 36415 COLL VENOUS BLD VENIPUNCTURE: CPT | Mod: ORL | Performed by: GENERAL PRACTICE

## 2021-10-11 PROCEDURE — P9604 ONE-WAY ALLOW PRORATED TRIP: HCPCS | Mod: ORL | Performed by: GENERAL PRACTICE

## 2021-10-12 LAB
GAMMA INTERFERON BACKGROUND BLD IA-ACNC: 0.2 IU/ML
M TB IFN-G BLD-IMP: NEGATIVE
M TB IFN-G CD4+ BCKGRND COR BLD-ACNC: 4.65 IU/ML
MITOGEN IGNF BCKGRD COR BLD-ACNC: -0.05 IU/ML
MITOGEN IGNF BCKGRD COR BLD-ACNC: -0.05 IU/ML
QUANTIFERON MITOGEN: 4.85 IU/ML
QUANTIFERON NIL TUBE: 0.2 IU/ML
QUANTIFERON TB1 TUBE: 0.15 IU/ML
QUANTIFERON TB2 TUBE: 0.15

## 2021-10-13 ENCOUNTER — LAB REQUISITION (OUTPATIENT)
Dept: LAB | Facility: CLINIC | Age: 86
End: 2021-10-13
Payer: COMMERCIAL

## 2021-10-13 DIAGNOSIS — S32.592D OTHER SPECIFIED FRACTURE OF LEFT PUBIS, SUBSEQUENT ENCOUNTER FOR FRACTURE WITH ROUTINE HEALING: ICD-10-CM

## 2021-10-14 LAB
BASOPHILS # BLD AUTO: 0 10E3/UL (ref 0–0.2)
BASOPHILS NFR BLD AUTO: 0 %
EOSINOPHIL # BLD AUTO: 0.2 10E3/UL (ref 0–0.7)
EOSINOPHIL NFR BLD AUTO: 3 %
ERYTHROCYTE [DISTWIDTH] IN BLOOD BY AUTOMATED COUNT: 17.2 % (ref 10–15)
HCT VFR BLD AUTO: 27.4 % (ref 35–47)
HGB BLD-MCNC: 9 G/DL (ref 11.7–15.7)
IMM GRANULOCYTES # BLD: 0.2 10E3/UL
IMM GRANULOCYTES NFR BLD: 2 %
LYMPHOCYTES # BLD AUTO: 1.4 10E3/UL (ref 0.8–5.3)
LYMPHOCYTES NFR BLD AUTO: 19 %
MCH RBC QN AUTO: 33 PG (ref 26.5–33)
MCHC RBC AUTO-ENTMCNC: 32.8 G/DL (ref 31.5–36.5)
MCV RBC AUTO: 100 FL (ref 78–100)
MONOCYTES # BLD AUTO: 0.7 10E3/UL (ref 0–1.3)
MONOCYTES NFR BLD AUTO: 10 %
NEUTROPHILS # BLD AUTO: 4.7 10E3/UL (ref 1.6–8.3)
NEUTROPHILS NFR BLD AUTO: 66 %
NRBC # BLD AUTO: 0 10E3/UL
NRBC BLD AUTO-RTO: 0 /100
PLATELET # BLD AUTO: 229 10E3/UL (ref 150–450)
RBC # BLD AUTO: 2.73 10E6/UL (ref 3.8–5.2)
WBC # BLD AUTO: 7.2 10E3/UL (ref 4–11)

## 2021-10-14 PROCEDURE — 36415 COLL VENOUS BLD VENIPUNCTURE: CPT | Mod: ORL | Performed by: GENERAL PRACTICE

## 2021-10-14 PROCEDURE — P9603 ONE-WAY ALLOW PRORATED MILES: HCPCS | Mod: ORL | Performed by: GENERAL PRACTICE

## 2021-10-14 PROCEDURE — 85025 COMPLETE CBC W/AUTO DIFF WBC: CPT | Mod: ORL | Performed by: GENERAL PRACTICE

## 2021-10-16 ENCOUNTER — LAB REQUISITION (OUTPATIENT)
Dept: LAB | Facility: CLINIC | Age: 86
End: 2021-10-16
Payer: COMMERCIAL

## 2021-10-16 DIAGNOSIS — N17.9 ACUTE KIDNEY FAILURE, UNSPECIFIED (H): ICD-10-CM

## 2021-10-18 ENCOUNTER — LAB REQUISITION (OUTPATIENT)
Dept: LAB | Facility: CLINIC | Age: 86
End: 2021-10-18
Payer: COMMERCIAL

## 2021-10-18 DIAGNOSIS — N17.9 ACUTE KIDNEY FAILURE, UNSPECIFIED (H): ICD-10-CM

## 2021-10-18 LAB
ANION GAP SERPL CALCULATED.3IONS-SCNC: 6 MMOL/L (ref 5–18)
BUN SERPL-MCNC: 23 MG/DL (ref 8–28)
CALCIUM SERPL-MCNC: 9.6 MG/DL (ref 8.5–10.5)
CHLORIDE BLD-SCNC: 104 MMOL/L (ref 98–107)
CO2 SERPL-SCNC: 27 MMOL/L (ref 22–31)
CREAT SERPL-MCNC: 0.62 MG/DL (ref 0.6–1.1)
GFR SERPL CREATININE-BSD FRML MDRD: 79 ML/MIN/1.73M2
GLUCOSE BLD-MCNC: 82 MG/DL (ref 70–125)
POTASSIUM BLD-SCNC: 4.3 MMOL/L (ref 3.5–5)
SODIUM SERPL-SCNC: 137 MMOL/L (ref 136–145)

## 2021-10-18 PROCEDURE — 80048 BASIC METABOLIC PNL TOTAL CA: CPT | Mod: ORL | Performed by: GENERAL PRACTICE

## 2021-10-18 PROCEDURE — P9604 ONE-WAY ALLOW PRORATED TRIP: HCPCS | Performed by: GENERAL PRACTICE

## 2021-10-18 PROCEDURE — 36415 COLL VENOUS BLD VENIPUNCTURE: CPT | Mod: ORL | Performed by: GENERAL PRACTICE

## 2021-10-19 LAB
ANION GAP SERPL CALCULATED.3IONS-SCNC: 4 MMOL/L (ref 5–18)
BUN SERPL-MCNC: 27 MG/DL (ref 8–28)
CALCIUM SERPL-MCNC: 9.5 MG/DL (ref 8.5–10.5)
CHLORIDE BLD-SCNC: 104 MMOL/L (ref 98–107)
CO2 SERPL-SCNC: 28 MMOL/L (ref 22–31)
CREAT SERPL-MCNC: 0.62 MG/DL (ref 0.6–1.1)
GFR SERPL CREATININE-BSD FRML MDRD: 79 ML/MIN/1.73M2
GLUCOSE BLD-MCNC: 85 MG/DL (ref 70–125)
POTASSIUM BLD-SCNC: 4.2 MMOL/L (ref 3.5–5)
SODIUM SERPL-SCNC: 136 MMOL/L (ref 136–145)

## 2021-10-19 PROCEDURE — P9604 ONE-WAY ALLOW PRORATED TRIP: HCPCS | Performed by: GENERAL PRACTICE

## 2021-10-19 PROCEDURE — 36415 COLL VENOUS BLD VENIPUNCTURE: CPT | Mod: ORL | Performed by: GENERAL PRACTICE

## 2021-10-19 PROCEDURE — 82310 ASSAY OF CALCIUM: CPT | Performed by: GENERAL PRACTICE

## 2021-10-25 NOTE — TELEPHONE ENCOUNTER
Call out to [facility] nurse, message given    Eleni Travis RN, BS  Clinical Nurse Triage.     [>50% of the face to face encounter time was spent on counseling and/or coordination of care for ___] : Greater than 50% of the face to face encounter time was spent on counseling and/or coordination of care for [unfilled]

## 2021-11-07 ENCOUNTER — LAB REQUISITION (OUTPATIENT)
Dept: LAB | Facility: CLINIC | Age: 86
End: 2021-11-07
Payer: COMMERCIAL

## 2021-11-07 DIAGNOSIS — R30.0 DYSURIA: ICD-10-CM

## 2021-11-07 LAB
ALBUMIN UR-MCNC: NEGATIVE MG/DL
AMORPH CRY #/AREA URNS HPF: ABNORMAL /HPF
APPEARANCE UR: ABNORMAL
BACTERIA #/AREA URNS HPF: ABNORMAL /HPF
BILIRUB UR QL STRIP: NEGATIVE
COLOR UR AUTO: ABNORMAL
GLUCOSE UR STRIP-MCNC: NEGATIVE MG/DL
HGB UR QL STRIP: NEGATIVE
HYALINE CASTS: 1 /LPF
KETONES UR STRIP-MCNC: NEGATIVE MG/DL
LEUKOCYTE ESTERASE UR QL STRIP: ABNORMAL
MUCOUS THREADS #/AREA URNS LPF: PRESENT /LPF
NITRATE UR QL: POSITIVE
PH UR STRIP: 7.5 [PH] (ref 5–7)
RBC URINE: 1 /HPF
SP GR UR STRIP: 1.01 (ref 1–1.03)
SQUAMOUS EPITHELIAL: 2 /HPF
UROBILINOGEN UR STRIP-MCNC: <2 MG/DL
WBC URINE: 11 /HPF

## 2021-11-07 PROCEDURE — 81001 URINALYSIS AUTO W/SCOPE: CPT | Performed by: GENERAL PRACTICE

## 2021-11-07 PROCEDURE — 87086 URINE CULTURE/COLONY COUNT: CPT | Performed by: GENERAL PRACTICE

## 2021-11-08 LAB — BACTERIA UR CULT: NORMAL

## 2021-12-21 ENCOUNTER — LAB REQUISITION (OUTPATIENT)
Dept: LAB | Facility: CLINIC | Age: 86
End: 2021-12-21
Payer: COMMERCIAL

## 2021-12-21 DIAGNOSIS — I10 ESSENTIAL (PRIMARY) HYPERTENSION: ICD-10-CM

## 2021-12-22 LAB
ALBUMIN SERPL-MCNC: 3.5 G/DL (ref 3.5–5)
ALP SERPL-CCNC: 107 U/L (ref 45–120)
ALT SERPL W P-5'-P-CCNC: <9 U/L (ref 0–45)
ANION GAP SERPL CALCULATED.3IONS-SCNC: 9 MMOL/L (ref 5–18)
AST SERPL W P-5'-P-CCNC: 10 U/L (ref 0–40)
BASOPHILS # BLD AUTO: 0.1 10E3/UL (ref 0–0.2)
BASOPHILS NFR BLD AUTO: 1 %
BILIRUB SERPL-MCNC: 0.7 MG/DL (ref 0–1)
BUN SERPL-MCNC: 14 MG/DL (ref 8–28)
CALCIUM SERPL-MCNC: 9.7 MG/DL (ref 8.5–10.5)
CHLORIDE BLD-SCNC: 102 MMOL/L (ref 98–107)
CO2 SERPL-SCNC: 25 MMOL/L (ref 22–31)
CREAT SERPL-MCNC: 0.59 MG/DL (ref 0.6–1.1)
EOSINOPHIL # BLD AUTO: 0.2 10E3/UL (ref 0–0.7)
EOSINOPHIL NFR BLD AUTO: 4 %
ERYTHROCYTE [DISTWIDTH] IN BLOOD BY AUTOMATED COUNT: 14.4 % (ref 10–15)
GFR SERPL CREATININE-BSD FRML MDRD: 85 ML/MIN/1.73M2
GLUCOSE BLD-MCNC: 78 MG/DL (ref 70–125)
HCT VFR BLD AUTO: 32.8 % (ref 35–47)
HGB BLD-MCNC: 10.5 G/DL (ref 11.7–15.7)
IMM GRANULOCYTES # BLD: 0 10E3/UL
IMM GRANULOCYTES NFR BLD: 1 %
LYMPHOCYTES # BLD AUTO: 1.1 10E3/UL (ref 0.8–5.3)
LYMPHOCYTES NFR BLD AUTO: 18 %
MCH RBC QN AUTO: 32.2 PG (ref 26.5–33)
MCHC RBC AUTO-ENTMCNC: 32 G/DL (ref 31.5–36.5)
MCV RBC AUTO: 101 FL (ref 78–100)
MONOCYTES # BLD AUTO: 0.6 10E3/UL (ref 0–1.3)
MONOCYTES NFR BLD AUTO: 10 %
NEUTROPHILS # BLD AUTO: 4.3 10E3/UL (ref 1.6–8.3)
NEUTROPHILS NFR BLD AUTO: 66 %
NRBC # BLD AUTO: 0 10E3/UL
NRBC BLD AUTO-RTO: 0 /100
PLATELET # BLD AUTO: 239 10E3/UL (ref 150–450)
POTASSIUM BLD-SCNC: 3.6 MMOL/L (ref 3.5–5)
PROT SERPL-MCNC: 6.5 G/DL (ref 6–8)
RBC # BLD AUTO: 3.26 10E6/UL (ref 3.8–5.2)
SODIUM SERPL-SCNC: 136 MMOL/L (ref 136–145)
WBC # BLD AUTO: 6.3 10E3/UL (ref 4–11)

## 2021-12-22 PROCEDURE — 85025 COMPLETE CBC W/AUTO DIFF WBC: CPT | Mod: ORL | Performed by: NURSE PRACTITIONER

## 2021-12-22 PROCEDURE — 80053 COMPREHEN METABOLIC PANEL: CPT | Mod: ORL | Performed by: NURSE PRACTITIONER

## 2021-12-22 PROCEDURE — P9603 ONE-WAY ALLOW PRORATED MILES: HCPCS | Mod: ORL | Performed by: NURSE PRACTITIONER

## 2021-12-22 PROCEDURE — 36415 COLL VENOUS BLD VENIPUNCTURE: CPT | Mod: ORL | Performed by: NURSE PRACTITIONER

## 2022-05-07 ENCOUNTER — HEALTH MAINTENANCE LETTER (OUTPATIENT)
Age: 87
End: 2022-05-07

## 2022-06-09 ENCOUNTER — TELEPHONE (OUTPATIENT)
Dept: PALLIATIVE MEDICINE | Facility: CLINIC | Age: 87
End: 2022-06-09
Payer: COMMERCIAL

## 2022-06-09 NOTE — TELEPHONE ENCOUNTER
Last seen 09/2021. Transfer to PCP    Alisson DIANE, RN Care Coordinator  [facility]  Pain [facility]

## 2022-06-12 PROCEDURE — 87086 URINE CULTURE/COLONY COUNT: CPT | Mod: ORL | Performed by: NURSE PRACTITIONER

## 2022-06-12 PROCEDURE — 81001 URINALYSIS AUTO W/SCOPE: CPT | Mod: ORL | Performed by: NURSE PRACTITIONER

## 2022-06-13 ENCOUNTER — LAB REQUISITION (OUTPATIENT)
Dept: LAB | Facility: CLINIC | Age: 87
End: 2022-06-13
Payer: COMMERCIAL

## 2022-06-13 DIAGNOSIS — R30.0 DYSURIA: ICD-10-CM

## 2022-06-13 LAB
ALBUMIN UR-MCNC: NEGATIVE MG/DL
APPEARANCE UR: ABNORMAL
BACTERIA #/AREA URNS HPF: ABNORMAL /HPF
BILIRUB UR QL STRIP: NEGATIVE
COLOR UR AUTO: ABNORMAL
GLUCOSE UR STRIP-MCNC: NEGATIVE MG/DL
HGB UR QL STRIP: NEGATIVE
KETONES UR STRIP-MCNC: NEGATIVE MG/DL
LEUKOCYTE ESTERASE UR QL STRIP: ABNORMAL
MUCOUS THREADS #/AREA URNS LPF: PRESENT /LPF
NITRATE UR QL: POSITIVE
PH UR STRIP: 6.5 [PH] (ref 5–7)
RBC URINE: 2 /HPF
SP GR UR STRIP: 1.01 (ref 1–1.03)
SQUAMOUS EPITHELIAL: 1 /HPF
UROBILINOGEN UR STRIP-MCNC: <2 MG/DL
WBC URINE: 26 /HPF

## 2022-06-15 LAB — BACTERIA UR CULT: NORMAL

## 2022-06-19 ENCOUNTER — LAB REQUISITION (OUTPATIENT)
Dept: LAB | Facility: CLINIC | Age: 87
End: 2022-06-19
Payer: COMMERCIAL

## 2022-06-19 DIAGNOSIS — E55.9 VITAMIN D DEFICIENCY, UNSPECIFIED: ICD-10-CM

## 2022-06-19 DIAGNOSIS — I10 ESSENTIAL (PRIMARY) HYPERTENSION: ICD-10-CM

## 2022-06-22 LAB
ALBUMIN SERPL-MCNC: 3.4 G/DL (ref 3.5–5)
ALP SERPL-CCNC: 57 U/L (ref 45–120)
ALT SERPL W P-5'-P-CCNC: <9 U/L (ref 0–45)
ANION GAP SERPL CALCULATED.3IONS-SCNC: 8 MMOL/L (ref 5–18)
AST SERPL W P-5'-P-CCNC: 9 U/L (ref 0–40)
BILIRUB SERPL-MCNC: 0.9 MG/DL (ref 0–1)
BUN SERPL-MCNC: 16 MG/DL (ref 8–28)
CALCIUM SERPL-MCNC: 9.5 MG/DL (ref 8.5–10.5)
CHLORIDE BLD-SCNC: 102 MMOL/L (ref 98–107)
CO2 SERPL-SCNC: 26 MMOL/L (ref 22–31)
CREAT SERPL-MCNC: 0.63 MG/DL (ref 0.6–1.1)
ERYTHROCYTE [DISTWIDTH] IN BLOOD BY AUTOMATED COUNT: 14.4 % (ref 10–15)
GFR SERPL CREATININE-BSD FRML MDRD: 83 ML/MIN/1.73M2
GLUCOSE BLD-MCNC: 97 MG/DL (ref 70–125)
HCT VFR BLD AUTO: 28.6 % (ref 35–47)
HGB BLD-MCNC: 9.2 G/DL (ref 11.7–15.7)
MCH RBC QN AUTO: 31.9 PG (ref 26.5–33)
MCHC RBC AUTO-ENTMCNC: 32.2 G/DL (ref 31.5–36.5)
MCV RBC AUTO: 99 FL (ref 78–100)
PLATELET # BLD AUTO: 217 10E3/UL (ref 150–450)
POTASSIUM BLD-SCNC: 3.7 MMOL/L (ref 3.5–5)
PROT SERPL-MCNC: 6.3 G/DL (ref 6–8)
RBC # BLD AUTO: 2.88 10E6/UL (ref 3.8–5.2)
SODIUM SERPL-SCNC: 136 MMOL/L (ref 136–145)
WBC # BLD AUTO: 5.2 10E3/UL (ref 4–11)

## 2022-06-22 PROCEDURE — 36415 COLL VENOUS BLD VENIPUNCTURE: CPT | Mod: ORL | Performed by: NURSE PRACTITIONER

## 2022-06-22 PROCEDURE — 85027 COMPLETE CBC AUTOMATED: CPT | Mod: ORL | Performed by: NURSE PRACTITIONER

## 2022-06-22 PROCEDURE — P9604 ONE-WAY ALLOW PRORATED TRIP: HCPCS | Mod: ORL | Performed by: NURSE PRACTITIONER

## 2022-06-22 PROCEDURE — 82306 VITAMIN D 25 HYDROXY: CPT | Mod: ORL | Performed by: NURSE PRACTITIONER

## 2022-06-22 PROCEDURE — 80053 COMPREHEN METABOLIC PANEL: CPT | Mod: ORL | Performed by: NURSE PRACTITIONER

## 2022-06-23 LAB — DEPRECATED CALCIDIOL+CALCIFEROL SERPL-MC: 34 UG/L (ref 20–75)

## 2022-07-14 ENCOUNTER — LAB REQUISITION (OUTPATIENT)
Dept: LAB | Facility: CLINIC | Age: 87
End: 2022-07-14
Payer: COMMERCIAL

## 2022-07-14 DIAGNOSIS — R30.0 DYSURIA: ICD-10-CM

## 2022-07-14 LAB
ALBUMIN UR-MCNC: NEGATIVE MG/DL
APPEARANCE UR: ABNORMAL
BILIRUB UR QL STRIP: NEGATIVE
COLOR UR AUTO: ABNORMAL
GLUCOSE UR STRIP-MCNC: NEGATIVE MG/DL
HGB UR QL STRIP: NEGATIVE
KETONES UR STRIP-MCNC: NEGATIVE MG/DL
LEUKOCYTE ESTERASE UR QL STRIP: ABNORMAL
MUCOUS THREADS #/AREA URNS LPF: PRESENT /LPF
NITRATE UR QL: NEGATIVE
PH UR STRIP: 7 [PH] (ref 5–7)
RBC URINE: <1 /HPF
SP GR UR STRIP: 1.01 (ref 1–1.03)
UROBILINOGEN UR STRIP-MCNC: NORMAL MG/DL
WBC URINE: 3 /HPF

## 2022-07-14 PROCEDURE — 81001 URINALYSIS AUTO W/SCOPE: CPT | Mod: ORL | Performed by: NURSE PRACTITIONER

## 2022-08-12 ENCOUNTER — LAB REQUISITION (OUTPATIENT)
Dept: LAB | Facility: CLINIC | Age: 87
End: 2022-08-12
Payer: COMMERCIAL

## 2022-08-12 DIAGNOSIS — R30.0 DYSURIA: ICD-10-CM

## 2022-08-12 LAB
ALBUMIN UR-MCNC: NEGATIVE MG/DL
APPEARANCE UR: CLEAR
BACTERIA #/AREA URNS HPF: ABNORMAL /HPF
BILIRUB UR QL STRIP: NEGATIVE
COLOR UR AUTO: YELLOW
GLUCOSE UR STRIP-MCNC: NEGATIVE MG/DL
HGB UR QL STRIP: NEGATIVE
KETONES UR STRIP-MCNC: NEGATIVE MG/DL
LEUKOCYTE ESTERASE UR QL STRIP: ABNORMAL
MUCOUS THREADS #/AREA URNS LPF: PRESENT /LPF
NITRATE UR QL: POSITIVE
PH UR STRIP: 5 [PH] (ref 5–7)
RBC URINE: <1 /HPF
SP GR UR STRIP: 1 (ref 1–1.03)
SQUAMOUS EPITHELIAL: 1 /HPF
UROBILINOGEN UR STRIP-MCNC: NORMAL MG/DL
WBC URINE: 15 /HPF

## 2022-08-12 PROCEDURE — 87086 URINE CULTURE/COLONY COUNT: CPT | Mod: ORL | Performed by: NURSE PRACTITIONER

## 2022-08-12 PROCEDURE — 81001 URINALYSIS AUTO W/SCOPE: CPT | Mod: ORL | Performed by: NURSE PRACTITIONER

## 2022-08-15 LAB — BACTERIA UR CULT: ABNORMAL

## 2022-12-26 ENCOUNTER — LAB REQUISITION (OUTPATIENT)
Dept: LAB | Facility: CLINIC | Age: 87
End: 2022-12-26
Payer: COMMERCIAL

## 2022-12-26 DIAGNOSIS — F41.8 OTHER SPECIFIED ANXIETY DISORDERS: ICD-10-CM

## 2022-12-26 DIAGNOSIS — D50.9 IRON DEFICIENCY ANEMIA, UNSPECIFIED: ICD-10-CM

## 2022-12-26 DIAGNOSIS — I10 ESSENTIAL (PRIMARY) HYPERTENSION: ICD-10-CM

## 2022-12-28 LAB
ANION GAP SERPL CALCULATED.3IONS-SCNC: 14 MMOL/L (ref 7–15)
BUN SERPL-MCNC: 23.8 MG/DL (ref 8–23)
CALCIUM SERPL-MCNC: 9.4 MG/DL (ref 8.2–9.6)
CHLORIDE SERPL-SCNC: 102 MMOL/L (ref 98–107)
CREAT SERPL-MCNC: 0.56 MG/DL (ref 0.51–0.95)
DEPRECATED HCO3 PLAS-SCNC: 20 MMOL/L (ref 22–29)
GFR SERPL CREATININE-BSD FRML MDRD: 85 ML/MIN/1.73M2
GLUCOSE SERPL-MCNC: 87 MG/DL (ref 70–99)
HGB BLD-MCNC: 8.8 G/DL (ref 11.7–15.7)
POTASSIUM SERPL-SCNC: 3.6 MMOL/L (ref 3.4–5.3)
SODIUM SERPL-SCNC: 136 MMOL/L (ref 136–145)

## 2022-12-28 PROCEDURE — 36415 COLL VENOUS BLD VENIPUNCTURE: CPT | Mod: ORL | Performed by: FAMILY MEDICINE

## 2022-12-28 PROCEDURE — 80048 BASIC METABOLIC PNL TOTAL CA: CPT | Mod: ORL | Performed by: FAMILY MEDICINE

## 2022-12-28 PROCEDURE — 85018 HEMOGLOBIN: CPT | Mod: ORL | Performed by: FAMILY MEDICINE

## 2022-12-28 PROCEDURE — P9604 ONE-WAY ALLOW PRORATED TRIP: HCPCS | Mod: ORL | Performed by: FAMILY MEDICINE

## 2023-01-03 ENCOUNTER — LAB REQUISITION (OUTPATIENT)
Dept: LAB | Facility: CLINIC | Age: 88
End: 2023-01-03
Payer: COMMERCIAL

## 2023-01-03 DIAGNOSIS — D50.0 IRON DEFICIENCY ANEMIA SECONDARY TO BLOOD LOSS (CHRONIC): ICD-10-CM

## 2023-01-04 LAB
ERYTHROCYTE [DISTWIDTH] IN BLOOD BY AUTOMATED COUNT: 15 % (ref 10–15)
FERRITIN SERPL-MCNC: 339 NG/ML (ref 11–328)
FOLATE SERPL-MCNC: 8.2 NG/ML (ref 4.6–34.8)
HCT VFR BLD AUTO: 30.9 % (ref 35–47)
HGB BLD-MCNC: 9.6 G/DL (ref 11.7–15.7)
IRON BINDING CAPACITY (ROCHE): 238 UG/DL (ref 240–430)
IRON SATN MFR SERPL: 37 % (ref 15–46)
IRON SERPL-MCNC: 88 UG/DL (ref 37–145)
MCH RBC QN AUTO: 31.2 PG (ref 26.5–33)
MCHC RBC AUTO-ENTMCNC: 31.1 G/DL (ref 31.5–36.5)
MCV RBC AUTO: 100 FL (ref 78–100)
PLATELET # BLD AUTO: 244 10E3/UL (ref 150–450)
RBC # BLD AUTO: 3.08 10E6/UL (ref 3.8–5.2)
VIT B12 SERPL-MCNC: 460 PG/ML (ref 232–1245)
WBC # BLD AUTO: 7.4 10E3/UL (ref 4–11)

## 2023-01-04 PROCEDURE — 83550 IRON BINDING TEST: CPT | Mod: ORL | Performed by: FAMILY MEDICINE

## 2023-01-04 PROCEDURE — 36415 COLL VENOUS BLD VENIPUNCTURE: CPT | Mod: ORL | Performed by: FAMILY MEDICINE

## 2023-01-04 PROCEDURE — 82728 ASSAY OF FERRITIN: CPT | Mod: ORL | Performed by: FAMILY MEDICINE

## 2023-01-04 PROCEDURE — 82607 VITAMIN B-12: CPT | Mod: ORL | Performed by: FAMILY MEDICINE

## 2023-01-04 PROCEDURE — 85027 COMPLETE CBC AUTOMATED: CPT | Mod: ORL | Performed by: FAMILY MEDICINE

## 2023-01-04 PROCEDURE — 82746 ASSAY OF FOLIC ACID SERUM: CPT | Mod: ORL | Performed by: FAMILY MEDICINE

## 2023-01-07 ENCOUNTER — HEALTH MAINTENANCE LETTER (OUTPATIENT)
Age: 88
End: 2023-01-07

## 2023-01-23 ENCOUNTER — APPOINTMENT (OUTPATIENT)
Dept: GENERAL RADIOLOGY | Facility: CLINIC | Age: 88
DRG: 177 | End: 2023-01-23
Attending: EMERGENCY MEDICINE
Payer: COMMERCIAL

## 2023-01-23 ENCOUNTER — HOSPITAL ENCOUNTER (INPATIENT)
Facility: CLINIC | Age: 88
LOS: 18 days | Discharge: HOME HEALTH | DRG: 177 | End: 2023-02-10
Attending: EMERGENCY MEDICINE | Admitting: INTERNAL MEDICINE
Payer: COMMERCIAL

## 2023-01-23 DIAGNOSIS — J69.0 ASPIRATION PNEUMONITIS (H): ICD-10-CM

## 2023-01-23 DIAGNOSIS — R53.1 GENERALIZED WEAKNESS: ICD-10-CM

## 2023-01-23 DIAGNOSIS — R00.1 SINUS BRADYCARDIA: ICD-10-CM

## 2023-01-23 DIAGNOSIS — U07.1 INFECTION DUE TO 2019 NOVEL CORONAVIRUS: ICD-10-CM

## 2023-01-23 DIAGNOSIS — I10 HYPERTENSION, UNSPECIFIED TYPE: Primary | ICD-10-CM

## 2023-01-23 DIAGNOSIS — K59.00 CONSTIPATION, UNSPECIFIED CONSTIPATION TYPE: ICD-10-CM

## 2023-01-23 DIAGNOSIS — J96.01 ACUTE RESPIRATORY FAILURE WITH HYPOXIA (H): ICD-10-CM

## 2023-01-23 LAB
ANION GAP SERPL CALCULATED.3IONS-SCNC: 9 MMOL/L (ref 7–15)
BASOPHILS # BLD AUTO: 0.1 10E3/UL (ref 0–0.2)
BASOPHILS NFR BLD AUTO: 1 %
BUN SERPL-MCNC: 39.7 MG/DL (ref 8–23)
CALCIUM SERPL-MCNC: 9.2 MG/DL (ref 8.2–9.6)
CHLORIDE SERPL-SCNC: 101 MMOL/L (ref 98–107)
CREAT SERPL-MCNC: 0.78 MG/DL (ref 0.51–0.95)
DEPRECATED HCO3 PLAS-SCNC: 24 MMOL/L (ref 22–29)
EOSINOPHIL # BLD AUTO: 0.6 10E3/UL (ref 0–0.7)
EOSINOPHIL NFR BLD AUTO: 7 %
ERYTHROCYTE [DISTWIDTH] IN BLOOD BY AUTOMATED COUNT: 14.5 % (ref 10–15)
GFR SERPL CREATININE-BSD FRML MDRD: 71 ML/MIN/1.73M2
GLUCOSE SERPL-MCNC: 135 MG/DL (ref 70–99)
HCO3 BLDV-SCNC: 26 MMOL/L (ref 21–28)
HCT VFR BLD AUTO: 33.1 % (ref 35–47)
HGB BLD-MCNC: 10.5 G/DL (ref 11.7–15.7)
HOLD SPECIMEN: NORMAL
HOLD SPECIMEN: NORMAL
IMM GRANULOCYTES # BLD: 0.2 10E3/UL
IMM GRANULOCYTES NFR BLD: 2 %
LACTATE BLD-SCNC: 0.8 MMOL/L
LYMPHOCYTES # BLD AUTO: 3 10E3/UL (ref 0.8–5.3)
LYMPHOCYTES NFR BLD AUTO: 34 %
MCH RBC QN AUTO: 32.2 PG (ref 26.5–33)
MCHC RBC AUTO-ENTMCNC: 31.7 G/DL (ref 31.5–36.5)
MCV RBC AUTO: 102 FL (ref 78–100)
MONOCYTES # BLD AUTO: 0.8 10E3/UL (ref 0–1.3)
MONOCYTES NFR BLD AUTO: 9 %
NEUTROPHILS # BLD AUTO: 4.1 10E3/UL (ref 1.6–8.3)
NEUTROPHILS NFR BLD AUTO: 47 %
NRBC # BLD AUTO: 0 10E3/UL
NRBC BLD AUTO-RTO: 1 /100
PCO2 BLDV: 63 MM HG (ref 40–50)
PH BLDV: 7.23 [PH] (ref 7.32–7.43)
PLATELET # BLD AUTO: 347 10E3/UL (ref 150–450)
PO2 BLDV: 23 MM HG (ref 25–47)
POTASSIUM SERPL-SCNC: 4.2 MMOL/L (ref 3.4–5.3)
RBC # BLD AUTO: 3.26 10E6/UL (ref 3.8–5.2)
SAO2 % BLDV: 29 % (ref 94–100)
SODIUM SERPL-SCNC: 134 MMOL/L (ref 136–145)
WBC # BLD AUTO: 8.7 10E3/UL (ref 4–11)

## 2023-01-23 PROCEDURE — 99285 EMERGENCY DEPT VISIT HI MDM: CPT | Mod: 25

## 2023-01-23 PROCEDURE — 120N000001 HC R&B MED SURG/OB

## 2023-01-23 PROCEDURE — 93005 ELECTROCARDIOGRAM TRACING: CPT

## 2023-01-23 PROCEDURE — 96374 THER/PROPH/DIAG INJ IV PUSH: CPT

## 2023-01-23 PROCEDURE — 85025 COMPLETE CBC W/AUTO DIFF WBC: CPT | Performed by: EMERGENCY MEDICINE

## 2023-01-23 PROCEDURE — 36415 COLL VENOUS BLD VENIPUNCTURE: CPT | Performed by: EMERGENCY MEDICINE

## 2023-01-23 PROCEDURE — 250N000011 HC RX IP 250 OP 636: Performed by: INTERNAL MEDICINE

## 2023-01-23 PROCEDURE — 96375 TX/PRO/DX INJ NEW DRUG ADDON: CPT

## 2023-01-23 PROCEDURE — 250N000009 HC RX 250: Performed by: INTERNAL MEDICINE

## 2023-01-23 PROCEDURE — 80048 BASIC METABOLIC PNL TOTAL CA: CPT | Performed by: EMERGENCY MEDICINE

## 2023-01-23 PROCEDURE — 999N000105 HC STATISTIC NO DOCUMENTATION TO SUPPORT CHARGE

## 2023-01-23 PROCEDURE — 71045 X-RAY EXAM CHEST 1 VIEW: CPT

## 2023-01-23 PROCEDURE — 82803 BLOOD GASES ANY COMBINATION: CPT

## 2023-01-23 PROCEDURE — 258N000003 HC RX IP 258 OP 636: Performed by: INTERNAL MEDICINE

## 2023-01-23 PROCEDURE — 99223 1ST HOSP IP/OBS HIGH 75: CPT | Performed by: INTERNAL MEDICINE

## 2023-01-23 RX ORDER — HYDRALAZINE HYDROCHLORIDE 20 MG/ML
10 INJECTION INTRAMUSCULAR; INTRAVENOUS EVERY 6 HOURS PRN
Status: DISCONTINUED | OUTPATIENT
Start: 2023-01-23 | End: 2023-02-10 | Stop reason: HOSPADM

## 2023-01-23 RX ORDER — AMOXICILLIN 250 MG
2 CAPSULE ORAL 2 TIMES DAILY PRN
Status: DISCONTINUED | OUTPATIENT
Start: 2023-01-23 | End: 2023-02-10 | Stop reason: HOSPADM

## 2023-01-23 RX ORDER — AMOXICILLIN 250 MG
1 CAPSULE ORAL 2 TIMES DAILY PRN
Status: DISCONTINUED | OUTPATIENT
Start: 2023-01-23 | End: 2023-02-10 | Stop reason: HOSPADM

## 2023-01-23 RX ORDER — ONDANSETRON 2 MG/ML
4 INJECTION INTRAMUSCULAR; INTRAVENOUS EVERY 6 HOURS PRN
Status: DISCONTINUED | OUTPATIENT
Start: 2023-01-23 | End: 2023-02-10 | Stop reason: HOSPADM

## 2023-01-23 RX ORDER — PROCHLORPERAZINE 25 MG
12.5 SUPPOSITORY, RECTAL RECTAL EVERY 12 HOURS PRN
Status: DISCONTINUED | OUTPATIENT
Start: 2023-01-23 | End: 2023-01-26

## 2023-01-23 RX ORDER — FERROUS GLUCONATE 324(37.5)
1 TABLET ORAL
COMMUNITY

## 2023-01-23 RX ORDER — HYDROXYZINE HYDROCHLORIDE 10 MG/1
10 TABLET, FILM COATED ORAL 2 TIMES DAILY
COMMUNITY
End: 2024-05-02

## 2023-01-23 RX ORDER — LIDOCAINE 40 MG/G
CREAM TOPICAL
Status: DISCONTINUED | OUTPATIENT
Start: 2023-01-23 | End: 2023-02-10 | Stop reason: HOSPADM

## 2023-01-23 RX ORDER — PROCHLORPERAZINE MALEATE 5 MG
5 TABLET ORAL EVERY 6 HOURS PRN
Status: DISCONTINUED | OUTPATIENT
Start: 2023-01-23 | End: 2023-01-26

## 2023-01-23 RX ORDER — DEXTROSE MONOHYDRATE, SODIUM CHLORIDE, AND POTASSIUM CHLORIDE 50; 1.49; 4.5 G/1000ML; G/1000ML; G/1000ML
INJECTION, SOLUTION INTRAVENOUS CONTINUOUS
Status: DISCONTINUED | OUTPATIENT
Start: 2023-01-23 | End: 2023-01-24

## 2023-01-23 RX ORDER — IPRATROPIUM BROMIDE AND ALBUTEROL SULFATE 2.5; .5 MG/3ML; MG/3ML
3 SOLUTION RESPIRATORY (INHALATION)
Status: DISCONTINUED | OUTPATIENT
Start: 2023-01-23 | End: 2023-01-24

## 2023-01-23 RX ORDER — ONDANSETRON 4 MG/1
4 TABLET, ORALLY DISINTEGRATING ORAL EVERY 6 HOURS PRN
Status: DISCONTINUED | OUTPATIENT
Start: 2023-01-23 | End: 2023-02-10 | Stop reason: HOSPADM

## 2023-01-23 RX ORDER — HYDROMORPHONE HYDROCHLORIDE 1 MG/ML
0.5 INJECTION, SOLUTION INTRAMUSCULAR; INTRAVENOUS; SUBCUTANEOUS
Status: DISCONTINUED | OUTPATIENT
Start: 2023-01-23 | End: 2023-01-25

## 2023-01-23 RX ORDER — ONDANSETRON 4 MG/1
4 TABLET, FILM COATED ORAL EVERY 8 HOURS PRN
COMMUNITY

## 2023-01-23 RX ORDER — ALBUTEROL SULFATE 0.83 MG/ML
2.5 SOLUTION RESPIRATORY (INHALATION)
Status: DISCONTINUED | OUTPATIENT
Start: 2023-01-23 | End: 2023-01-24

## 2023-01-23 RX ORDER — POLYETHYLENE GLYCOL 3350 17 G/17G
17 POWDER, FOR SOLUTION ORAL DAILY PRN
Status: DISCONTINUED | OUTPATIENT
Start: 2023-01-23 | End: 2023-02-10 | Stop reason: HOSPADM

## 2023-01-23 RX ORDER — SACCHAROMYCES BOULARDII 250 MG
250 CAPSULE ORAL DAILY
COMMUNITY
End: 2024-05-02

## 2023-01-23 RX ORDER — GABAPENTIN 300 MG/1
300 CAPSULE ORAL 3 TIMES DAILY
COMMUNITY
End: 2024-05-02

## 2023-01-23 RX ORDER — NYSTATIN 100000 U/G
CREAM TOPICAL 2 TIMES DAILY PRN
COMMUNITY

## 2023-01-23 RX ORDER — HYDROMORPHONE HYDROCHLORIDE 2 MG/1
2 TABLET ORAL EVERY 6 HOURS PRN
Status: DISCONTINUED | OUTPATIENT
Start: 2023-01-23 | End: 2023-01-25

## 2023-01-23 RX ORDER — HYDROCODONE BITARTRATE AND ACETAMINOPHEN 5; 325 MG/1; MG/1
1 TABLET ORAL 3 TIMES DAILY PRN
COMMUNITY
End: 2024-05-02

## 2023-01-23 RX ADMIN — IPRATROPIUM BROMIDE AND ALBUTEROL SULFATE 3 ML: .5; 2.5 SOLUTION RESPIRATORY (INHALATION) at 21:16

## 2023-01-23 RX ADMIN — HYDROMORPHONE HYDROCHLORIDE 0.5 MG: 1 INJECTION, SOLUTION INTRAMUSCULAR; INTRAVENOUS; SUBCUTANEOUS at 21:16

## 2023-01-23 RX ADMIN — POTASSIUM CHLORIDE, DEXTROSE MONOHYDRATE AND SODIUM CHLORIDE: 150; 5; 450 INJECTION, SOLUTION INTRAVENOUS at 21:16

## 2023-01-23 RX ADMIN — FAMOTIDINE 20 MG: 10 INJECTION, SOLUTION INTRAVENOUS at 21:16

## 2023-01-23 ASSESSMENT — ENCOUNTER SYMPTOMS
BACK PAIN: 1
CHOKING: 1

## 2023-01-23 ASSESSMENT — ACTIVITIES OF DAILY LIVING (ADL)
ADLS_ACUITY_SCORE: 35
ADLS_ACUITY_SCORE: 47
ADLS_ACUITY_SCORE: 35

## 2023-01-24 ENCOUNTER — APPOINTMENT (OUTPATIENT)
Dept: SPEECH THERAPY | Facility: CLINIC | Age: 88
DRG: 177 | End: 2023-01-24
Attending: INTERNAL MEDICINE
Payer: COMMERCIAL

## 2023-01-24 ENCOUNTER — APPOINTMENT (OUTPATIENT)
Dept: GENERAL RADIOLOGY | Facility: CLINIC | Age: 88
DRG: 177 | End: 2023-01-24
Attending: INTERNAL MEDICINE
Payer: COMMERCIAL

## 2023-01-24 ENCOUNTER — APPOINTMENT (OUTPATIENT)
Dept: CT IMAGING | Facility: CLINIC | Age: 88
DRG: 177 | End: 2023-01-24
Attending: FAMILY MEDICINE
Payer: COMMERCIAL

## 2023-01-24 ENCOUNTER — APPOINTMENT (OUTPATIENT)
Dept: SPEECH THERAPY | Facility: CLINIC | Age: 88
DRG: 177 | End: 2023-01-24
Payer: COMMERCIAL

## 2023-01-24 PROBLEM — A41.9 SEPSIS SECONDARY TO UTI (H): Status: RESOLVED | Noted: 2019-07-29 | Resolved: 2023-01-24

## 2023-01-24 PROBLEM — I21.4 NSTEMI (NON-ST ELEVATED MYOCARDIAL INFARCTION) (H): Status: RESOLVED | Noted: 2017-05-28 | Resolved: 2023-01-24

## 2023-01-24 PROBLEM — S82.831A CLOSED FRACTURE OF PROXIMAL END OF RIGHT TIBIA AND FIBULA: Status: RESOLVED | Noted: 2019-08-28 | Resolved: 2023-01-24

## 2023-01-24 PROBLEM — M25.559 HIP PAIN: Status: RESOLVED | Noted: 2017-06-19 | Resolved: 2023-01-24

## 2023-01-24 PROBLEM — R19.7 DIARRHEA: Status: RESOLVED | Noted: 2017-06-22 | Resolved: 2023-01-24

## 2023-01-24 PROBLEM — S32.592A CLOSED FRACTURE OF MULTIPLE PUBIC RAMI, LEFT, INITIAL ENCOUNTER (H): Status: RESOLVED | Noted: 2021-10-06 | Resolved: 2023-01-24

## 2023-01-24 PROBLEM — N39.0 UTI (URINARY TRACT INFECTION): Status: RESOLVED | Noted: 2019-05-26 | Resolved: 2023-01-24

## 2023-01-24 PROBLEM — R79.89 ELEVATED TROPONIN: Status: RESOLVED | Noted: 2019-05-26 | Resolved: 2023-01-24

## 2023-01-24 PROBLEM — S82.101A CLOSED FRACTURE OF PROXIMAL END OF RIGHT TIBIA AND FIBULA: Status: RESOLVED | Noted: 2019-08-28 | Resolved: 2023-01-24

## 2023-01-24 PROBLEM — N39.0 URINARY TRACT INFECTION WITHOUT HEMATURIA, SITE UNSPECIFIED: Status: RESOLVED | Noted: 2019-07-04 | Resolved: 2023-01-24

## 2023-01-24 PROBLEM — A41.9 SEPSIS (H): Status: RESOLVED | Noted: 2019-08-21 | Resolved: 2023-01-24

## 2023-01-24 PROBLEM — N39.0 URINARY TRACT INFECTION WITHOUT HEMATURIA: Status: RESOLVED | Noted: 2017-06-01 | Resolved: 2023-01-24

## 2023-01-24 PROBLEM — N39.0 SEPSIS SECONDARY TO UTI (H): Status: RESOLVED | Noted: 2019-07-29 | Resolved: 2023-01-24

## 2023-01-24 PROBLEM — S72.102A: Status: RESOLVED | Noted: 2017-06-20 | Resolved: 2023-01-24

## 2023-01-24 PROBLEM — N17.9 AKI (ACUTE KIDNEY INJURY) (H): Status: RESOLVED | Noted: 2019-07-13 | Resolved: 2023-01-24
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
## 2023-01-24 LAB
ALBUMIN SERPL BCG-MCNC: 4 G/DL (ref 3.5–5.2)
ALBUMIN UR-MCNC: 10 MG/DL
ALP SERPL-CCNC: 77 U/L (ref 35–104)
ALT SERPL W P-5'-P-CCNC: 8 U/L (ref 10–35)
ANION GAP SERPL CALCULATED.3IONS-SCNC: 11 MMOL/L (ref 7–15)
APPEARANCE UR: CLEAR
AST SERPL W P-5'-P-CCNC: 14 U/L (ref 10–35)
ATRIAL RATE - MUSE: 54 BPM
BACTERIA #/AREA URNS HPF: ABNORMAL /HPF
BASOPHILS # BLD AUTO: 0.1 10E3/UL (ref 0–0.2)
BASOPHILS NFR BLD AUTO: 0 %
BILIRUB DIRECT SERPL-MCNC: 0.2 MG/DL (ref 0–0.3)
BILIRUB SERPL-MCNC: 1.1 MG/DL
BILIRUB UR QL STRIP: NEGATIVE
BUN SERPL-MCNC: 35.9 MG/DL (ref 8–23)
CALCIUM SERPL-MCNC: 9.5 MG/DL (ref 8.2–9.6)
CHLORIDE SERPL-SCNC: 103 MMOL/L (ref 98–107)
COLOR UR AUTO: YELLOW
CREAT SERPL-MCNC: 0.6 MG/DL (ref 0.51–0.95)
DEPRECATED HCO3 PLAS-SCNC: 24 MMOL/L (ref 22–29)
DIASTOLIC BLOOD PRESSURE - MUSE: NORMAL MMHG
EOSINOPHIL # BLD AUTO: 0 10E3/UL (ref 0–0.7)
EOSINOPHIL NFR BLD AUTO: 0 %
ERYTHROCYTE [DISTWIDTH] IN BLOOD BY AUTOMATED COUNT: 14.8 % (ref 10–15)
GFR SERPL CREATININE-BSD FRML MDRD: 84 ML/MIN/1.73M2
GLUCOSE BLDC GLUCOMTR-MCNC: 163 MG/DL (ref 70–99)
GLUCOSE SERPL-MCNC: 134 MG/DL (ref 70–99)
GLUCOSE UR STRIP-MCNC: NEGATIVE MG/DL
HCT VFR BLD AUTO: 31.5 % (ref 35–47)
HGB BLD-MCNC: 10 G/DL (ref 11.7–15.7)
HGB UR QL STRIP: NEGATIVE
IMM GRANULOCYTES # BLD: 0.2 10E3/UL
IMM GRANULOCYTES NFR BLD: 1 %
INTERPRETATION ECG - MUSE: NORMAL
KETONES UR STRIP-MCNC: NEGATIVE MG/DL
LEUKOCYTE ESTERASE UR QL STRIP: ABNORMAL
LYMPHOCYTES # BLD AUTO: 0.8 10E3/UL (ref 0.8–5.3)
LYMPHOCYTES NFR BLD AUTO: 3 %
MCH RBC QN AUTO: 32.3 PG (ref 26.5–33)
MCHC RBC AUTO-ENTMCNC: 31.7 G/DL (ref 31.5–36.5)
MCV RBC AUTO: 102 FL (ref 78–100)
MONOCYTES # BLD AUTO: 0.7 10E3/UL (ref 0–1.3)
MONOCYTES NFR BLD AUTO: 3 %
MUCOUS THREADS #/AREA URNS LPF: PRESENT /LPF
NEUTROPHILS # BLD AUTO: 24.9 10E3/UL (ref 1.6–8.3)
NEUTROPHILS NFR BLD AUTO: 93 %
NITRATE UR QL: POSITIVE
NRBC # BLD AUTO: 0 10E3/UL
NRBC BLD AUTO-RTO: 0 /100
P AXIS - MUSE: 66 DEGREES
PH UR STRIP: 5.5 [PH] (ref 5–7)
PLATELET # BLD AUTO: 250 10E3/UL (ref 150–450)
POTASSIUM SERPL-SCNC: 4.5 MMOL/L (ref 3.4–5.3)
PR INTERVAL - MUSE: 186 MS
PROLACTIN SERPL 3RD IS-MCNC: 6 NG/ML (ref 5–23)
PROT SERPL-MCNC: 6.9 G/DL (ref 6.4–8.3)
QRS DURATION - MUSE: 88 MS
QT - MUSE: 454 MS
QTC - MUSE: 430 MS
R AXIS - MUSE: -31 DEGREES
RBC # BLD AUTO: 3.1 10E6/UL (ref 3.8–5.2)
RBC URINE: 2 /HPF
SODIUM SERPL-SCNC: 138 MMOL/L (ref 136–145)
SP GR UR STRIP: 1.02 (ref 1–1.03)
SQUAMOUS EPITHELIAL: 1 /HPF
SYSTOLIC BLOOD PRESSURE - MUSE: NORMAL MMHG
T AXIS - MUSE: 55 DEGREES
UROBILINOGEN UR STRIP-MCNC: NORMAL MG/DL
VENTRICULAR RATE- MUSE: 54 BPM
WBC # BLD AUTO: 26.6 10E3/UL (ref 4–11)
WBC URINE: 6 /HPF

## 2023-01-24 PROCEDURE — 92610 EVALUATE SWALLOWING FUNCTION: CPT | Mod: GN

## 2023-01-24 PROCEDURE — 999N000157 HC STATISTIC RCP TIME EA 10 MIN

## 2023-01-24 PROCEDURE — 85025 COMPLETE CBC W/AUTO DIFF WBC: CPT | Performed by: INTERNAL MEDICINE

## 2023-01-24 PROCEDURE — 81001 URINALYSIS AUTO W/SCOPE: CPT | Performed by: FAMILY MEDICINE

## 2023-01-24 PROCEDURE — 80053 COMPREHEN METABOLIC PANEL: CPT | Performed by: INTERNAL MEDICINE

## 2023-01-24 PROCEDURE — 250N000009 HC RX 250: Performed by: FAMILY MEDICINE

## 2023-01-24 PROCEDURE — 87186 SC STD MICRODIL/AGAR DIL: CPT | Performed by: FAMILY MEDICINE

## 2023-01-24 PROCEDURE — 250N000011 HC RX IP 250 OP 636: Performed by: INTERNAL MEDICINE

## 2023-01-24 PROCEDURE — 94640 AIRWAY INHALATION TREATMENT: CPT | Mod: 76

## 2023-01-24 PROCEDURE — 82040 ASSAY OF SERUM ALBUMIN: CPT | Performed by: FAMILY MEDICINE

## 2023-01-24 PROCEDURE — 36415 COLL VENOUS BLD VENIPUNCTURE: CPT | Performed by: INTERNAL MEDICINE

## 2023-01-24 PROCEDURE — 92611 MOTION FLUOROSCOPY/SWALLOW: CPT | Mod: GN

## 2023-01-24 PROCEDURE — 74230 X-RAY XM SWLNG FUNCJ C+: CPT

## 2023-01-24 PROCEDURE — 94640 AIRWAY INHALATION TREATMENT: CPT

## 2023-01-24 PROCEDURE — 258N000003 HC RX IP 258 OP 636: Performed by: FAMILY MEDICINE

## 2023-01-24 PROCEDURE — 70450 CT HEAD/BRAIN W/O DYE: CPT

## 2023-01-24 PROCEDURE — 99232 SBSQ HOSP IP/OBS MODERATE 35: CPT | Performed by: FAMILY MEDICINE

## 2023-01-24 PROCEDURE — 120N000001 HC R&B MED SURG/OB

## 2023-01-24 PROCEDURE — 250N000013 HC RX MED GY IP 250 OP 250 PS 637: Performed by: FAMILY MEDICINE

## 2023-01-24 PROCEDURE — 84146 ASSAY OF PROLACTIN: CPT | Performed by: FAMILY MEDICINE

## 2023-01-24 RX ORDER — IPRATROPIUM BROMIDE AND ALBUTEROL SULFATE 2.5; .5 MG/3ML; MG/3ML
3 SOLUTION RESPIRATORY (INHALATION) 3 TIMES DAILY
Status: DISCONTINUED | OUTPATIENT
Start: 2023-01-24 | End: 2023-01-25

## 2023-01-24 RX ORDER — NALOXONE HYDROCHLORIDE 0.4 MG/ML
0.4 INJECTION, SOLUTION INTRAMUSCULAR; INTRAVENOUS; SUBCUTANEOUS
Status: DISCONTINUED | OUTPATIENT
Start: 2023-01-24 | End: 2023-02-10 | Stop reason: HOSPADM

## 2023-01-24 RX ORDER — AMLODIPINE BESYLATE 10 MG/1
10 TABLET ORAL DAILY
Status: DISCONTINUED | OUTPATIENT
Start: 2023-01-24 | End: 2023-01-27

## 2023-01-24 RX ORDER — AMOXICILLIN 250 MG
1 CAPSULE ORAL AT BEDTIME
Status: DISCONTINUED | OUTPATIENT
Start: 2023-01-24 | End: 2023-02-10 | Stop reason: HOSPADM

## 2023-01-24 RX ORDER — NALOXONE HYDROCHLORIDE 0.4 MG/ML
0.2 INJECTION, SOLUTION INTRAMUSCULAR; INTRAVENOUS; SUBCUTANEOUS
Status: DISCONTINUED | OUTPATIENT
Start: 2023-01-24 | End: 2023-02-10 | Stop reason: HOSPADM

## 2023-01-24 RX ORDER — CLONIDINE HYDROCHLORIDE 0.1 MG/1
0.1 TABLET ORAL AT BEDTIME
Status: DISCONTINUED | OUTPATIENT
Start: 2023-01-24 | End: 2023-02-01

## 2023-01-24 RX ORDER — ACETAMINOPHEN 325 MG/1
650 TABLET ORAL 3 TIMES DAILY
Status: DISCONTINUED | OUTPATIENT
Start: 2023-01-24 | End: 2023-02-10 | Stop reason: HOSPADM

## 2023-01-24 RX ORDER — SERTRALINE HYDROCHLORIDE 100 MG/1
100 TABLET, FILM COATED ORAL DAILY
Status: DISCONTINUED | OUTPATIENT
Start: 2023-01-24 | End: 2023-02-10 | Stop reason: HOSPADM

## 2023-01-24 RX ORDER — ISOSORBIDE DINITRATE 10 MG/1
10 TABLET ORAL 3 TIMES DAILY
Status: DISCONTINUED | OUTPATIENT
Start: 2023-01-24 | End: 2023-02-10 | Stop reason: HOSPADM

## 2023-01-24 RX ORDER — CARVEDILOL 25 MG/1
25 TABLET ORAL AT BEDTIME
Status: DISCONTINUED | OUTPATIENT
Start: 2023-01-24 | End: 2023-02-10 | Stop reason: HOSPADM

## 2023-01-24 RX ORDER — PANTOPRAZOLE SODIUM 40 MG/1
40 TABLET, DELAYED RELEASE ORAL DAILY
Status: DISCONTINUED | OUTPATIENT
Start: 2023-01-24 | End: 2023-01-26

## 2023-01-24 RX ORDER — GABAPENTIN 300 MG/1
300 CAPSULE ORAL 3 TIMES DAILY
Status: DISCONTINUED | OUTPATIENT
Start: 2023-01-24 | End: 2023-02-10 | Stop reason: HOSPADM

## 2023-01-24 RX ORDER — SODIUM CHLORIDE 9 MG/ML
INJECTION, SOLUTION INTRAVENOUS CONTINUOUS
Status: DISCONTINUED | OUTPATIENT
Start: 2023-01-24 | End: 2023-01-25

## 2023-01-24 RX ORDER — CARVEDILOL 12.5 MG/1
12.5 TABLET ORAL EVERY MORNING
Status: DISCONTINUED | OUTPATIENT
Start: 2023-01-25 | End: 2023-02-10 | Stop reason: HOSPADM

## 2023-01-24 RX ORDER — SACCHAROMYCES BOULARDII 250 MG
250 CAPSULE ORAL DAILY
Status: DISCONTINUED | OUTPATIENT
Start: 2023-01-24 | End: 2023-02-10 | Stop reason: HOSPADM

## 2023-01-24 RX ORDER — ALBUTEROL SULFATE 0.83 MG/ML
2.5 SOLUTION RESPIRATORY (INHALATION) EVERY 6 HOURS PRN
Status: DISCONTINUED | OUTPATIENT
Start: 2023-01-24 | End: 2023-02-10 | Stop reason: HOSPADM

## 2023-01-24 RX ORDER — HYDROXYZINE HYDROCHLORIDE 10 MG/1
10 TABLET, FILM COATED ORAL 2 TIMES DAILY
Status: DISCONTINUED | OUTPATIENT
Start: 2023-01-24 | End: 2023-02-02

## 2023-01-24 RX ORDER — HYDROCODONE BITARTRATE AND ACETAMINOPHEN 5; 325 MG/1; MG/1
1 TABLET ORAL 3 TIMES DAILY PRN
Status: DISCONTINUED | OUTPATIENT
Start: 2023-01-24 | End: 2023-02-10 | Stop reason: HOSPADM

## 2023-01-24 RX ADMIN — ACETAMINOPHEN 650 MG: 325 TABLET, FILM COATED ORAL at 22:11

## 2023-01-24 RX ADMIN — MICONAZOLE NITRATE: 20 POWDER TOPICAL at 20:36

## 2023-01-24 RX ADMIN — ONDANSETRON 4 MG: 2 INJECTION INTRAMUSCULAR; INTRAVENOUS at 16:59

## 2023-01-24 RX ADMIN — CLONIDINE HYDROCHLORIDE 0.1 MG: 0.1 TABLET ORAL at 22:11

## 2023-01-24 RX ADMIN — MICONAZOLE NITRATE: 20 POWDER TOPICAL at 14:40

## 2023-01-24 RX ADMIN — IPRATROPIUM BROMIDE AND ALBUTEROL SULFATE 3 ML: 2.5; .5 SOLUTION RESPIRATORY (INHALATION) at 20:38

## 2023-01-24 RX ADMIN — HYDROXYZINE HYDROCHLORIDE 10 MG: 10 TABLET ORAL at 20:28

## 2023-01-24 RX ADMIN — GABAPENTIN 300 MG: 300 CAPSULE ORAL at 22:11

## 2023-01-24 RX ADMIN — CARVEDILOL 25 MG: 25 TABLET, FILM COATED ORAL at 22:11

## 2023-01-24 RX ADMIN — FAMOTIDINE 20 MG: 10 INJECTION, SOLUTION INTRAVENOUS at 20:27

## 2023-01-24 RX ADMIN — IPRATROPIUM BROMIDE AND ALBUTEROL SULFATE 3 ML: 2.5; .5 SOLUTION RESPIRATORY (INHALATION) at 15:37

## 2023-01-24 RX ADMIN — FAMOTIDINE 20 MG: 10 INJECTION, SOLUTION INTRAVENOUS at 09:00

## 2023-01-24 RX ADMIN — HYDROCODONE BITARTRATE AND ACETAMINOPHEN 1 TABLET: 5; 325 TABLET ORAL at 22:11

## 2023-01-24 RX ADMIN — IPRATROPIUM BROMIDE AND ALBUTEROL SULFATE 3 ML: 2.5; .5 SOLUTION RESPIRATORY (INHALATION) at 07:40

## 2023-01-24 RX ADMIN — ISOSORBIDE DINITRATE 10 MG: 10 TABLET ORAL at 22:11

## 2023-01-24 RX ADMIN — SODIUM CHLORIDE: 9 INJECTION, SOLUTION INTRAVENOUS at 17:02

## 2023-01-24 ASSESSMENT — ACTIVITIES OF DAILY LIVING (ADL)
ADLS_ACUITY_SCORE: 53
ADLS_ACUITY_SCORE: 47
ADLS_ACUITY_SCORE: 53

## 2023-01-24 NOTE — PROGRESS NOTES
Patient Transfer Information  Patient connected to monitoring equipment on arrival: N/A     Patient connected to wall oxygen on arrival: Yes  - O2 Sat 97% on 4L O2 via oxymask    Belongings: Transferred with patient    Safety check completed: Yes

## 2023-01-24 NOTE — ED TRIAGE NOTES
Pt arrives via EMS from care facility. Pt was having dinner and began choking. EMS reports pt was at 61%, then 89% with 15L Oxygen. EMS reports thick phlegm secretions coming out of throat. Upon arrival into ER, pt was attempting to talk, speech not clear due to thick secretions in throat.

## 2023-01-24 NOTE — PROGRESS NOTES
"Video Fluoroscopic Swallow Study (VFSS)     01/24/23 4240   Appointment Info   Signing Clinician's Name / Credentials (SLP) Bonita Hsu MS CCC-SLP   General Information   Onset of Illness/Injury or Date of Surgery 01/23/23   Referring Physician Dr. Byrd   Patient/Family Therapy Goal Statement (SLP) To eat/drink   Pertinent History of Current Problem   Per H&P \"Daya Ignacio is a 89 year old female with known prior history of hypertension, prior C. difficile colitis, chronic pain on chronic narcotics, multiple compression fractures in the past, ESBL UTI, anxiety, depression, cognitive impairment and reportedly lives in an assisted living facility set up and unfortunately has to presents in the emergency room by emergency personnel earlier after she was found having jerking-like sensation and concerns for aspiration while partaking her dinner food tray.\" Pt currently admitted with acute hypoxic respiratory failure, aspiration event, suspected aspiration pneumonitis.     SLP consulted for swallow evaluation. Signs/sx of pharyngeal vs esophageal dysphagia + potential aspiration on clinical swallow evaluation this AM therefore VFSS pursued for further assessment.     General Observations   Pt alert, upright in VFSS chair.     ? increased confusion compared to earlier evaluation - no awareness of why she is doing this test/ declines any difficulty swallowing when earlier she reported chronic hx and good recall of events during prior nights dinner that lead to current admission.     Significant dislike of barium, max encouragement for ongoing trials following first sip.     Type of Evaluation   Type of Evaluation Swallow Evaluation   General Swallowing Observations   Swallowing Evaluation Videofluoroscopic swallow study (VFSS)   VFSS Evaluation   Radiologist Dr. Santoro   Views Taken left lateral  (A/P and esophageal sweep not completed as pt declining further barium)   Physical Location of Procedure [facility] " [facility], Radiology Dept, Fluoroscopy Suite   VFSS Textures Trialed thin liquids;pureed;solid foods   VFSS Eval: Thin Liquid Texture Trial   Mode of Presentation, Thin Liquid spoon;cup;straw   Order of Presentation 1, 2, 5   Preparatory Phase poor bolus control   Oral Phase, Thin Liquid premature pharyngeal entry   Bolus Location When Swallow Triggered pyriforms   Pharyngeal Phase, Thin Liquid impaired hyolaryngeal excursion;impaired epiglottic movement   Rosenbek's Penetration Aspiration Scale: Thin Liquid Trial Results 2 - contrast enters airway, remains above the vocal cords, no residue remains (penetration)   Diagnostic Statement x1 flash penetration on first swallow via tsp; no pen/asp with cup/straw   VFSS Evaluation: Puree Solid Texture Trial   Order of Presentation 3   Preparatory Phase WFL   Oral Phase, Puree WFL   Bolus Location When Swallow Triggered valleculae   Pharyngeal Phase, Puree WFL   Rosenbek's Penetration Aspiration Scale: Puree Food Trial Results 1 - no aspiration, contrast does not enter airway   VFSS Evaluation: Solid Food Texture Trial   Order of Presentation 4   Preparatory Phase WFL   Oral Phase, Solid WFL   Bolus Location When Swallow Triggered valleculae   Pharyngeal Phase, Solid WFL   Esophageal Phase of Swallow   Esophageal comments mild prominence of cricopharyngeus/ narrowing at level of PES though bolus flows into upper esophagus without difficulty   Swallowing Recommendations   Diet Consistency Recommendations soft & bite-sized (level 6);thin liquids (level 0)   Supervision Level for Intake 1:1 supervision needed   Mode of Delivery Recommendations bolus size, small;slow rate of intake   Swallowing Maneuver Recommendations alternate food and liquid intake  (PRN)   Monitoring/Assistance Required (Eating/Swallowing) check mouth frequently for oral residue/pocketing;cue for finger/lingual sweep if oral pocketing present;stop eating activities when fatigue is present;monitor for  cough or change in vocal quality with intake   Recommended Feeding/Eating Techniques (Swallow Eval) maintain upright sitting position for eating;maintain upright posture during/after eating for 30 minutes;minimize distractions during oral intake;moisten oral mucosa prior to intake   Medication Administration Recommendations, Swallowing (SLP) whole as tolerated   Clinical Impression   Criteria for Skilled Therapeutic Interventions Met (SLP Eval) Yes, treatment indicated   SLP Diagnosis minimal oropharyngeal dysphagia   Risks & Benefits of therapy have been explained evaluation/treatment results reviewed;care plan/treatment goals reviewed;participants included;patient   Clinical Impression Comments   Video fluoroscopic swallow study completed with thin liquids, puree solids, regular solids. Pt currently presents with minimal oropharyngeal dysphagia. Mastication and oral propulsion appear WFL; trace lingual/ base of tongue residuals noted though insignificant. Base of tongue retraction WFL, mildly reduced hyolaryngeal elevation/excursion, mildly reduced epiglottic inversion. Pharyngoesophageal segement (PES) opening during the swallow appears functional despite notable mild prominence of cricopharyngeus -- this did not impede bolus transfer into upper esophagus. No pharyngeal residuals. X1 instance of flash penetration on first sip of thin liquids when fed via tsp; no laryngeal penetration/aspiration with thin via cup/straw or solids.     SLP Total Evaluation Time   Evaluation, videofluoroscopic eval of swallow function Minutes (96152) 12   SLP Goals   Therapy Frequency (SLP Eval) 5 times/wk   SLP Predicted Duration/Target Date for Goal Attainment 01/31/23   SLP Goals Swallow   SLP: Safely tolerate diet without signs/symptoms of aspiration Thin liquids;With use of swallow precautions;Independently   SLP Discharge Planning   SLP Plan meal f/u, ADAT   SLP Discharge Recommendation Long term care facility  (with  SLP)    SLP Rationale for DC Rec Anticipate back to LTC facility, pt slightly below baseline with slight solid modifiations though anticipate upgrades to regular by time of discharge. Could consider  SLP if not, for follow up and staff education.   SLP Brief overview of current status  Soft and Bite Sized Diet (level 6); Thin Liquids (level 0) (1:1 supervision for PO, ensure pt fully chewing/clearing oral cavity before next bite, fully upright, slow rate, liquid wash PRN. Question potential for esophageal dysfunction - could consider esophagram (though ? if pt would participate given extreme dislike of barium/max encouragement for this assesment).   Total Session Time   Total Session Time (sum of timed and untimed services) 12

## 2023-01-24 NOTE — ED NOTES
Per facility RN pt was sipping on water when they first noticed her choking. Her dinner was hamburger, strawberries, peas, and corn. MD aware.

## 2023-01-24 NOTE — H&P
[facility]    History and Physical - Hospitalist Service       Date of Admission:  1/23/2023    Assessment & Plan      Daya Ignacio is a 89 year old female with known prior history of hypertension, prior C. difficile colitis, chronic pain on chronic narcotics, multiple compression fractures in the past, ESBL UTI, anxiety, depression, cognitive impairment and reportedly lives in an assisted living facility set up and unfortunately has to presents in the emergency room by emergency personnel earlier after she was found having jerking-like sensation and concerns for aspiration while partaking her dinner food tray.  During EMS evaluation she was found to be hypoxic at 61% via room air but were able to increase to close to 90% utilizing 15 L of oxygen in route here    Problem list:    #1 acute hypoxic respiratory failure  #2 aspiration event, witnessed-significant amount of food material suctioned in route to the emergency room  #3 suspected aspiration pneumonitis    -Admit as inpatient.  -She is high risk for clinical deterioration will be needing close monitoring and care  -Continuation of oxygen support and titrate as needed  -If she develops wheezing low threshold in providing her at least with corticosteroids  -Round-the-clock breathing treatments for now  -As needed albuterol also ordered  -No indication yet for antibiotics  -May also benefit for a dose of Lasix later on if with worsening hypoxia  -She verbalized earlier clear instructions and directives regarding no desire of pursuing any intervention such as even short-term intubation, bronchoscopy consistent with her known advanced wishes in the past and POLST  -Fall precautions please  -Keep her n.p.o. for now may have some minimal ice chips or sips of water  -Formal speech and swallow evaluation  -IV fluid support  -IV Pepcid    #4 chronic pain on chronic narcotics  -I reviewed her electronic medical records, home regimen and she is  able to tolerate narcotics and opioids in the past.  -We will provide her home regimen if able to tolerate oral meds later on with appropriate as needed IV opioids for severe pain    #5 history of C. difficile colitis    #6 history of ESBL UTI in the past    #7 history of cognitive impairment    #8 history of hypertension  -Currently on hold multiple home regimen  -Might need as needed Apresoline coverage while n.p.o.      Will need social service evaluation as well for discharge planning disposition         Diet:  Keep her n.p.o. until seen by SLP  DVT Prophylaxis: Pneumatic Compression Devices  Almanza Catheter: Not present  Lines: None     Cardiac Monitoring: None  Code Status:   DNR/DNI    Clinically Significant Risk Factors Present on Admission                  # Hypertension: home medication list includes antihypertensive(s)              Disposition Plan      Expected Discharge Date: 01/25/2023                  Prakash Byrd MD, MD  Hospitalist Service  [facility] [place]  Securely message with [employer] (more info)  Text page via [email or web address] Paging/Directory     ______________________________________________________________________    Chief Complaint   Noted aspiration-like symptoms with choking sensation    History is obtained from the patient  Discussion with emergency room service and review of electronic medical record    History of Present Illness   Daya Ignacio is a 89 year old female with known prior history of hypertension, prior C. difficile colitis, chronic pain on chronic narcotics, multiple compression fractures in the past, ESBL UTI, anxiety, depression, cognitive impairment and reportedly lives in an assisted living facility set up and unfortunately has to presents in the emergency room by emergency personnel earlier after she was found having jerking-like sensation and concerns for aspiration while partaking her dinner food tray.  During EMS evaluation she was found to be hypoxic  at 61% via room air but were able to increase to close to 90% utilizing 15 L of oxygen in route here.  She is having symptomatology of coughing spells and was able to suction a lot of her food particles at that time.  There were no reports of actual vomiting spells, diarrhea, bleeding tendencies nor complaints of chest pain, shortness of breath, fall events, focal weakness, abdominal pain prior to the aspiration event.   During her initial presentation the emergency room she was still hypoxic and needing 15 L of oxygen via oxygen mask and subsequently feeling a little better as she was able to communicate her needs and able to verbalize the event to the initial assessment in the emergency room.  Further investigation showed reassuring labs, normal kidney function, no leukocytosis with stable anemia.  Chest imaging with chest x-ray showed finding opacities radiating from the elham into the periphery of both lungs likely mild interstitial edema.  Multiple options were discussed with our patient by emergency room service such as bronchoscopy, short-term intubation if needing but patient verbalized her wishes consistent with her prior directives with limited intervention and does not want to proceed with any at her procedure.  She is comfortable receiving oxygen support and hospitalization hence this referral to our service for further management and care.          Past Medical History    Past Medical History:   Diagnosis Date     Anemia      Anxiety      Arthritis      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 4/16/2014     Depression      h/o Clostridium difficile colitis 06/2019     History of blood transfusion      Hypertension      Kidney stone      Lumbar compression fracture (H)      Macular degeneration (senile) of retina, unspecified      Mild cognitive impairment      Mumps      Other chronic pain     low back     Recurrent UTI      Upper GI bleed 06/2019     Urinary tract infection due to extended-spectrum  beta lactamase (ESBL)-producing Klebsiella 05/2019    resistant to Bactrim       Past Surgical History   Past Surgical History:   Procedure Laterality Date     Cataract removal NOS Bilateral      COMBINED CYSTOSCOPY, INSERT STENT URETER(S) Left 5/6/2020    Procedure: Cystoscopy with left ureteral stent insertion;  Surgeon: Gaurav Munson MD;  Location: [facility] OR     CYSTOSCOPY N/A 8/1/2019    Procedure: Exam under anesthesia, video cystopanendoscopy;  Surgeon: Dennis Carlson MD;  Location: [facility] OR     ESOPHAGOSCOPY, GASTROSCOPY, DUODENOSCOPY (EGD), COMBINED N/A 6/11/2019    Procedure: ESOPHAGOGASTRODUODENOSCOPY (EGD);  Surgeon: Gaurav Degroot MD;  Location:  GI     EXTRACORPOREAL SHOCK WAVE LITHOTRIPSY (ESWL) Left 5/28/2020    Procedure: LEFT EXTRACORPOREAL SHOCK WAVE LITHOTRIPSY;  Surgeon: Gaurav Munson MD;  Location: [facility] OR     Hip Pinning procedure Left      Hip replacement NOS Right      LASER HOLMIUM LITHOTRIPSY URETER(S), INSERT STENT, COMBINED Left 10/30/2020    Procedure: Cystoscopy, left ureteral stent exchange, left retrograde pyelogram, interpretation of fluoroscopic images, left ureteroscopy with holmium lithotripsy and stone basketing;  Surgeon: Gaurav Munson MD;  Location: [facility] OR     OPEN REDUCTION INTERNAL FIXATION TIBIAL PLATEAU Right 8/28/2019    Procedure: Open reduction internal fixation right proximal tibia fracture;  Surgeon: Molina Pardo MD;  Location:  OR     TONSILLECTOMY, ADENOIDECTOMY, COMBINED         Prior to Admission Medications   Prior to Admission Medications   Prescriptions Last Dose Informant Patient Reported? Taking?   Calcium Carbonate (CALCIUM 600 PO)   Yes No   Sig: Take 1 tablet by mouth 2 times daily    Cholecalciferol (VITAMIN D3) 2000 UNITS CAPS   Yes No   Sig: Take 2,000 Units by mouth daily    HYDROmorphone (DILAUDID) 2 MG tablet   No No   Sig: Take 1 tablet (2 mg) by mouth every 6 hours as needed for moderate to severe pain    Lidocaine (LIDOCARE) 4 % Patch   No No   Sig: Place 2 patches onto the skin every 24 hours To prevent lidocaine toxicity, patient should be patch free for 12 hrs daily.   Multiple Vitamins-Minerals (PRESERVISION AREDS) CAPS   Yes No   Sig: Take 1 capsule by mouth 2 times daily    SENNA-docusate sodium (SENNA S) 8.6-50 MG tablet   No No   Sig: Take 1 tablet by mouth At Bedtime   Patient taking differently: Take 1 tablet by mouth daily    acetaminophen (TYLENOL) 500 MG tablet   Yes No   Sig: Take 1,000 mg by mouth 3 times daily    acetaminophen (TYLENOL) 500 MG tablet   No No   Sig: Take 2 tablets (1,000 mg) by mouth daily as needed for mild pain   amLODIPine (NORVASC) 10 MG tablet   Yes No   Sig: Take 10 mg by mouth daily   carvedilol (COREG) 25 MG tablet   Yes No   Sig: Take 12.5 mg by mouth every morning    carvedilol (COREG) 25 MG tablet   Yes No   Sig: Take 25 mg by mouth At Bedtime   cloNIDine (CATAPRES) 0.1 MG tablet   Yes No   Sig: Take 0.1 mg by mouth At Bedtime    diclofenac (VOLTAREN) 1 % topical gel   Yes No   Sig: Place 2 g onto the skin 3 times daily as needed for moderate pain Apply to right knee   diclofenac (VOLTAREN) 1 % topical gel   No No   Sig: Apply 4 g topically 4 times daily   gabapentin (NEURONTIN) 100 MG capsule   No No   Sig: Take 2 capsules (200 mg) by mouth 3 times daily   hydrOXYzine (ATARAX) 10 MG tablet   No No   Sig: Take 1 tablet (10 mg) by mouth every 8 hours   isosorbide dinitrate (ISORDIL) 10 MG tablet   No No   Sig: Take 1 tablet (10 mg) by mouth 3 times daily   lisinopril (ZESTRIL) 30 MG tablet   Yes No   Sig: Take 30 mg by mouth daily   melatonin 1 MG TABS tablet   No No   Sig: Take 1 tablet (1 mg) by mouth nightly as needed for sleep   naloxone (NARCAN) 0.4 MG/ML injection   No No   Sig: Inject 0.5 mLs (0.2 mg) into the vein once for 1 dose   omeprazole (PRILOSEC) 20 MG DR capsule   Yes No   Sig: Take 20 mg by mouth daily    polyethylene glycol (MIRALAX/GLYCOLAX) packet   No  No   Sig: Take 17 g by mouth daily as needed for constipation   sertraline (ZOLOFT) 100 MG tablet   Yes No   Sig: Take 100 mg by mouth daily   vitamin C (ASCORBIC ACID) 500 MG tablet   Yes No   Sig: Take 500 mg by mouth daily       Facility-Administered Medications: None           Physical Exam   Vital Signs: Temp: 97.3  F (36.3  C) Temp src: Oral BP: 129/71 Pulse: (!) 43   Resp: 19 SpO2: 96 % O2 Device: Oxymask Oxygen Delivery: 8 LPM  Weight: 0 lbs 0 oz    HEENT; Atraumatic, normocephalic, pinkish conjuctiva, pupils bilateral reactive   Skin: warm and moist, no rashes  Lymphatics: no cervical or axillary lymphandenopathy  Lungs: Diminished breath sounds, coarse crackles heard, no obvious wheezing  Heart: normal rate, normal rhythm, no rubs or gallops.   Abdomen: normal bowel sounds, no tenderness, no peritoneal signs, no guarding  Extremities: no deformities, no edema   Neuro; follow commands, alert and oriented x3, spontaneous speech, coherent, moves all extremities spontaneously  Psych; no hallucination, euthymic mood, not agitated        Medical Decision Making       50 MINUTES SPENT BY ME on the date of service doing chart review, history, exam, documentation & further activities per the note.  MANAGEMENT DISCUSSED with the following over the past 24 hours: yes   NOTE(S)/MEDICAL RECORDS REVIEWED over the past 24 hours: yes  Tests ORDERED & REVIEWED in the past 24 hours:  Tests ORDERED in the past 24 hours:   Tests personally interpreted in the past 24 hours:      Data     I have personally reviewed the following data over the past 24 hrs:    8.7  \   10.5 (L)   / 347     134 (L) 101 39.7 (H) /  135 (H)   4.2 24 0.78 \       Procal: N/A CRP: N/A Lactic Acid: 0.8         Imaging results reviewed over the past 24 hrs:   Recent Results (from the past 24 hour(s))   XR Chest Port 1 View    Narrative    EXAM: XR CHEST PORT 1 VIEW  LOCATION: [facility]  DATE/TIME: 1/23/2023 6:22 PM    INDICATION:  shortness of breath  COMPARISON: 10/06/2021      Impression    IMPRESSION: Fine linear opacities radiating from the elham into the periphery of both lungs have developed consistent with mild interstitial edema. Heart size and pulmonary vascularity upper normal. Bones are demineralized.

## 2023-01-24 NOTE — PHARMACY-ADMISSION MEDICATION HISTORY
"Admission medication history interview status for this patient is complete. See [facility] admission navigator for allergy information, prior to admission medications and immunization status.     Medication history interview done, indicate source(s): N/A - pt from [facility]  Medication history resources (including written lists, pill bottles, clinic record): MAR sent by [facility] triage RNKriss  Pharmacy: N/A    Changes made to PTA medication list:  Added:     Ferrous gluconate 324 mg once daily    Florastor 250 mg once daily    Hydrocodone/Acetaminophen 5 mg/325 mg TID PRN     This medication was listed as \"once daily as needed for pain\" in the MAR, but showed 3 different scheduled administration times (08:00, 10:00, and 14:00) that meds were being charted as given. Changed frequency to match this administration, despite once daily MAR instructions.  Changed:     Acetaminophen 1000 mg TID PRN pain --> now takes 650 mg TID    Gabapentin 200 mg TID --> now takes 300 mg TID    Hydroxyzine 10 mg Q 8 hours --> now takes 10 mg BID    Voltaren gel TID PRN --> now takes TID   Reported as Not Taking: none  Removed:     Rx:    Voltaren gel (duplicate)    Hydromorphone PRN (rx from 2021)    Melatonin (rx from 2021)    Narcan (not on MAR)    OTC:    Calcium carbonate supplement (not on MAR)    Actions taken by pharmacist (provider contacted, etc): left MD castrejon     Additional medication history information:None    Medication reconciliation/reorder completed by provider prior to medication history?  Y   (Y/N)     Prior to Admission medications    Medication Sig Last Dose Taking? Auth Provider Long Term End Date   acetaminophen (TYLENOL) 325 MG tablet Take 650 mg by mouth 3 times daily 1/23/2023 at 1400 Yes Unknown, Entered By History     amLODIPine (NORVASC) 10 MG tablet Take 10 mg by mouth daily 1/23/2023 at AM Yes Unknown, Entered By History No    carvedilol (COREG) 25 MG tablet Take 25 mg by mouth " At Bedtime 1/22/2023 at HS Yes Unknown, Entered By History Yes    carvedilol (COREG) 25 MG tablet Take 12.5 mg by mouth every morning  1/23/2023 at AM Yes Reported, Patient Yes    Cholecalciferol (VITAMIN D3) 2000 UNITS CAPS Take 2,000 Units by mouth daily  1/23/2023 at AM Yes Unknown, Entered By History     cloNIDine (CATAPRES) 0.1 MG tablet Take 0.1 mg by mouth At Bedtime  1/22/2023 at HS Yes Unknown, Entered By History No    diclofenac (VOLTAREN) 1 % topical gel Place 2 g onto the skin 3 times daily Apply to right knee 1/23/2023 at 1400 Yes Unknown, Entered By History     Ferrous Gluconate 324 (37.5 Fe) MG TABS Take 1 tablet by mouth daily 1/23/2023 at AM Yes Unknown, Entered By History     gabapentin (NEURONTIN) 300 MG capsule Take 300 mg by mouth 3 times daily 1/23/2023 at 1400 Yes Unknown, Entered By History No    HYDROcodone-acetaminophen (NORCO) 5-325 MG tablet Take 1 tablet by mouth 3 times daily as needed for pain 1/21/2023 at AM Yes Unknown, Entered By History     hydrOXYzine (ATARAX) 10 MG tablet Take 10 mg by mouth 2 times daily 1/23/2023 Yes Unknown, Entered By History     isosorbide dinitrate (ISORDIL) 10 MG tablet Take 1 tablet (10 mg) by mouth 3 times daily 1/23/2023 at 1400 Yes Marcy Soares, PAPhilomenaC Yes    Lidocaine (LIDOCARE) 4 % Patch Place 2 patches onto the skin every 24 hours To prevent lidocaine toxicity, patient should be patch free for 12 hrs daily. 1/22/2023 at HS Yes Tanner Santo MD     lisinopril (ZESTRIL) 30 MG tablet Take 30 mg by mouth daily 1/23/2023 at AM Yes Unknown, Entered By History No    Multiple Vitamins-Minerals (PRESERVISION AREDS) CAPS Take 1 capsule by mouth 2 times daily  1/23/2023 at AM Yes Unknown, Entered By History     nystatin (MYCOSTATIN) 100819 UNIT/GM external cream Apply topically 2 times daily as needed (rash) 1/23/2023 at AM Yes Unknown, Entered By History     omeprazole (PRILOSEC) 20 MG DR capsule Take 20 mg by mouth daily  1/23/2023 at AM Yes Reported,  Patient     ondansetron (ZOFRAN) 4 MG tablet Take 4 mg by mouth every 8 hours as needed for nausea  at PRN Yes Unknown, Entered By History     polyethylene glycol (MIRALAX/GLYCOLAX) packet Take 17 g by mouth daily as needed for constipation  at PRN Yes Gisela Mehta PA-C     saccharomyces boulardii (FLORASTOR) 250 MG capsule Take 250 mg by mouth daily 1/23/2023 at AM Yes Unknown, Entered By History     SENNA-docusate sodium (SENNA S) 8.6-50 MG tablet Take 1 tablet by mouth At Bedtime 1/22/2023 at HS Yes Seema Quintero, APRN CNP     sertraline (ZOLOFT) 100 MG tablet Take 100 mg by mouth daily 1/23/2023 at AM Yes Unknown, Entered By History No    vitamin C (ASCORBIC ACID) 500 MG tablet Take 500 mg by mouth daily  1/23/2023 at AM Yes Reported, Patient       Nicollette McMann, JoeD, BCPS  [phone] (Med Reconciliation [facility] phone)

## 2023-01-24 NOTE — PROGRESS NOTES
"[facility] RCAT     Date: 1/24/23  Admission Dx: Aspiration PNA  Pulmonary History NA  Home Nebulizer/MDI Use: NA  Home Oxygen: NA  Acuity Level (RCAT flow sheet): 5  Aerosol Therapy initiated: Duoneb TID, Q6 prn      Pulmonary Hygiene initiated: Cough and deep breathing      Volume Expansion initiated: IS      Current Oxygen Requirements: 4LPM  Current SpO2: 95%    Re-evaluation date: 1/27/23    Patient Education: Education was performed with the patient in regards to indications/benefits and possible side effects of bronchodilators. Will continue to do education with patient.          See \"RT Assessments\" flow sheet for patient assessment scoring and Acuity Level Details.           "

## 2023-01-24 NOTE — CONSULTS
Care Management Initial Consult    General Information  Assessment completed with: Care Team MemberErlinda at [facility]  Type of CM/SW Visit: Initial Assessment    Primary Care Provider verified and updated as needed: Yes   Readmission within the last 30 days: no previous admission in last 30 days      Reason for Consult: discharge planning  Advance Care Planning:            Communication Assessment  Patient's communication style: spoken language (English or Bilingual)             Cognitive  Cognitive/Neuro/Behavioral: .WDL except, all  Level of Consciousness: confused, alert  Arousal Level: opens eyes spontaneously  Orientation: disoriented to, place, time, situation  Mood/Behavior: calm, cooperative  Best Language: 0 - No aphasia  Speech: clear, spontaneous, logical    Living Environment:   People in home: alone     Current living Arrangements: assisted living  Name of Facility: [facility]   Able to return to prior arrangements: other (see comments)  Living Arrangement Comments: TBD    Family/Social Support:  Care provided by: self, other (see comments)  Provides care for: no one                Description of Support System:   [facility], [facility]        Current Resources:   Patient receiving home care services: No     Community Resources: None  Equipment currently used at home: wheelchair, manual  Supplies currently used at home: Incontinence Supplies              Lifestyle & Psychosocial Needs:    Functional Status:  Prior to admission patient needed assistance:   Dependent ADLs:: Bathing, Dressing, Grooming, Transfers, Wheelchair-with assist  Dependent IADLs:: Cooking, Cleaning, Laundry, Meal Preparation, Medication Management  Assesssment of Functional Status: Not at baseline with ADL Functioning                  Additional Information:  Patient admitted after choking on food and drink at [facility] facility last pm with low O2 sats, R/O aspiration. Patient being followed by Hospitalist,  speech, requesting PT and OT consult.  Phoned son, Gael Ignacio, who gave permission to this writer to call Rene for description of services. Phoned ROBERTA Coffey, at [facility] , at [phone],(fax orders to [phone]) [place]n uses [facility]. and there patient requires an Assist of one to transfer into and ou of w/c.  They set up her toothbrush, comb and she performs these cares by herself. She receives the following services:dressing assistance, laundry, dining reminders, medications and safety checks.  At the facility she is alert and oriented x 3, but if forgetful. In the hospital she has needed an assist of 2 with lift at times and is oriented to only self. Spoke with patients RN here and have requested PT and OT evaluation here at the hospital. Hospital disposition to be determined.    With Rene GRANADOS, verified primary care provider, address of patient. Patient is SB 1.    Erinn Franco, RN, BSN, CM  Inpatient Care Coordination  [facility]  [phone]

## 2023-01-24 NOTE — PLAN OF CARE
Pertinent assessments: Oriented to self only. Up with ax2/lift. VSS. 4Loxymask. Denies pain/SOB. Congested cough. Incontinent of urine. Purewick in place. Awake most of night. NPO until speech consult.   Major Shift Events Uneventful  Treatment Plan: IVF, Speech consult  Bedside Nurse: Georgina Kelly RN

## 2023-01-24 NOTE — PROGRESS NOTES
"Pt A&O to self only. Disoriented to time, place and situation. On contact precaution for VRE and ESBL. Assist x 2. On 3 L of O2 via NC. Bradycardic. PIV infusing D5% and 0.45% NS + KCI 20 at 75 mL/hr    Continue to monitor    /45 (BP Location: Right arm)   Pulse 54   Temp 98  F (36.7  C) (Oral)   Resp 16   Ht 1.549 m (5' 1\")   Wt 59.4 kg (131 lb)   SpO2 97%   BMI 24.75 kg/m      "

## 2023-01-24 NOTE — ED NOTES
[facility]  ED Nurse Handoff Report    Daya Ignacio is a 89 year old female   ED Chief complaint: Aspiration  . ED Diagnosis:   Final diagnoses:   Acute respiratory failure with hypoxia (H)   Aspiration pneumonitis (H)   Sinus bradycardia     Allergies:   Allergies   Allergen Reactions     Atorvastatin Muscle Pain (Myalgia)     Macrobid [Nitrofurantoin Anhydrous] Other (See Comments)     Body aches     Nitrofurantoin Muscle Pain (Myalgia) and Unknown     Body aches       Penicillins Unknown     Seasonal Allergies Unknown     Sulfa Drugs Other (See Comments)     Tolerated Bactrim May 2019  Entire family has allergy to Sulfa     Sulfasalazine Unknown       Code Status: DNR / DNI  Activity level - Baseline/Home:  Stand by Assist. Activity Level - Current:   Assist X 2. Lift room needed: No. Bariatric: No   Needed: No   Isolation: No. Infection: Not Applicable.     Vital Signs:   Vitals:    01/23/23 1915 01/23/23 1930 01/23/23 1945 01/23/23 1954   BP: (!) 89/63 (!) 113/101 121/63 117/64   Pulse: (!) 45 (!) 48 52 51   Resp: 14 19 15 26   Temp:       TempSrc:       SpO2: 94% 93% 94% 97%       Cardiac Rhythm:  ,      Pain level:    Patient confused: Yes. Patient Falls Risk: Yes.   Elimination Status:  Has not voided in ER    Patient Report - Initial Complaint: Aspiration. Focused Assessment: Daya Ignacio is a 89 year old female with known prior history of hypertension, prior C. difficile colitis, chronic pain on chronic narcotics, multiple compression fractures in the past, ESBL UTI, anxiety, depression, cognitive impairment and reportedly lives in an assisted living facility set up and unfortunately has to presents in the emergency room by emergency personnel earlier after she was found having jerking-like sensation and concerns for aspiration while partaking her dinner food tray.  During EMS evaluation she was found to be hypoxic at 61% via room air but were able to increase to close to 90%  utilizing 15 L of oxygen in route here   Tests Performed:   XR Chest Port 1 View   Final Result   IMPRESSION: Fine linear opacities radiating from the elham into the periphery of both lungs have developed consistent with mild interstitial edema. Heart size and pulmonary vascularity upper normal. Bones are demineralized.        Labs Ordered and Resulted from Time of ED Arrival to Time of ED Departure   BASIC METABOLIC PANEL - Abnormal       Result Value    Sodium 134 (*)     Potassium 4.2      Chloride 101      Carbon Dioxide (CO2) 24      Anion Gap 9      Urea Nitrogen 39.7 (*)     Creatinine 0.78      Calcium 9.2      Glucose 135 (*)     GFR Estimate 71     CBC WITH PLATELETS AND DIFFERENTIAL - Abnormal    WBC Count 8.7      RBC Count 3.26 (*)     Hemoglobin 10.5 (*)     Hematocrit 33.1 (*)      (*)     MCH 32.2      MCHC 31.7      RDW 14.5      Platelet Count 347      % Neutrophils 47      % Lymphocytes 34      % Monocytes 9      % Eosinophils 7      % Basophils 1      % Immature Granulocytes 2      NRBCs per 100 WBC 1 (*)     Absolute Neutrophils 4.1      Absolute Lymphocytes 3.0      Absolute Monocytes 0.8      Absolute Eosinophils 0.6      Absolute Basophils 0.1      Absolute Immature Granulocytes 0.2      Absolute NRBCs 0.0     ISTAT GASES LACTATE VENOUS POCT - Abnormal    Lactic Acid POCT 0.8      Bicarbonate Venous POCT 26      O2 Sat, Venous POCT 29 (*)     pCO2V Venous POCT 63 (*)     pH Venous POCT 7.23 (*)     pO2 Venous POCT 23 (*)      . Abnormal Results: .   Treatments provided: See MAR  Family Comments: Gael Steward at bedside  OBS brochure/video discussed/provided to patient:  No  ED Medications:   Medications   lidocaine 1 % 0.1-1 mL (has no administration in time range)   lidocaine (LMX4) cream (has no administration in time range)   sodium chloride (PF) 0.9% PF flush 3 mL (has no administration in time range)   sodium chloride (PF) 0.9% PF flush 3 mL (has no administration in time range)    melatonin tablet 1 mg (has no administration in time range)   dextrose 5% and 0.45% NaCl + KCl 20 mEq/L infusion (has no administration in time range)   senna-docusate (SENOKOT-S/PERICOLACE) 8.6-50 MG per tablet 1 tablet (has no administration in time range)     Or   senna-docusate (SENOKOT-S/PERICOLACE) 8.6-50 MG per tablet 2 tablet (has no administration in time range)   polyethylene glycol (MIRALAX) Packet 17 g (has no administration in time range)   ondansetron (ZOFRAN ODT) ODT tab 4 mg (has no administration in time range)     Or   ondansetron (ZOFRAN) injection 4 mg (has no administration in time range)   prochlorperazine (COMPAZINE) injection 5 mg (has no administration in time range)     Or   prochlorperazine (COMPAZINE) tablet 5 mg (has no administration in time range)     Or   prochlorperazine (COMPAZINE) suppository 12.5 mg (has no administration in time range)   famotidine (PEPCID) injection 20 mg (has no administration in time range)   ipratropium - albuterol 0.5 mg/2.5 mg/3 mL (DUONEB) neb solution 3 mL (has no administration in time range)   albuterol (PROVENTIL) neb solution 2.5 mg (has no administration in time range)   HYDROmorphone (DILAUDID) tablet 2 mg (has no administration in time range)   HYDROmorphone (PF) (DILAUDID) injection 0.5 mg (has no administration in time range)   hydrALAZINE (APRESOLINE) injection 10 mg (has no administration in time range)     Drips infusing:  No  For the majority of the shift, the patient's behavior Green. Interventions performed were NA.    Sepsis treatment initiated: No     Patient tested for COVID 19 prior to admission: NO    ED Nurse Name/Phone Number: Jackie Zheng RN,   8:09 PM    RECEIVING UNIT ED HANDOFF REVIEW    Above ED Nurse Handoff Report was reviewed: Yes  Reviewed by: Alejandra Grant RN on January 23, 2023 at 9:07 PM

## 2023-01-24 NOTE — PROGRESS NOTES
[facility]    Medicine Progress Note - Hospitalist Service    Date of Admission:  1/23/2023    Assessment & Plan      Daya Ignacio is a 89 year old female with known prior history of hypertension, prior C. difficile colitis, chronic pain on chronic narcotics, multiple compression fractures in the past, ESBL UTI, anxiety, depression, cognitive impairment and reportedly lives in an assisted living facility set up and unfortunately has to presents in the emergency room by emergency personnel earlier after she was found having jerking-like sensation and concerns for aspiration while partaking her dinner food tray.  During EMS evaluation she was found to be hypoxic at 61% via room air but were able to increase to close to 90% utilizing 15 L of oxygen in route here    Afternoon of 1/24/2023 she had worsening mental status, which culminated in vomiting.  Further work-up is being pursued.    Problem list:  Principal Problem:    Aspiration pneumonitis (H)  Active Problems:    Hypertension    Acute respiratory failure with hypoxia (H)  -Now on room air  -No indication yet for antibiotics  -NPO until Formal speech and swallow evaluation  -IV Pepcid  -Restart antihypertensives, PRN hydralazine ordered    Altered mental status, etiology unclear at this time.  Differential includes delirium, postictal state, and intracranial hemorrhage  -Worsening and now associated with vomiting  -Patient was reportedly jerking during her aspiration event, will order seizure precautions  -Stat head CT  -Add prolactin to admission labs  -Hepatic function panel  -Urinalysis    Chronic pain on chronic narcotics  -I reviewed her electronic medical records, home regimen and she is able to tolerate narcotics and opioids in the past.  -We will provide her home regimen if able to tolerate oral meds later on with appropriate as needed IV opioids for severe pain    Will need social service evaluation as well for discharge planning  disposition       Diet: NPO for Medical/Clinical Reasons Except for: Ice Chips    DVT Prophylaxis: Pneumatic Compression Devices  Almanza Catheter: Not present  Lines: None     Cardiac Monitoring: None  Code Status: No CPR- Do NOT Intubate      Clinically Significant Risk Factors Present on Admission                  # Hypertension: home medication list includes antihypertensive(s)              Disposition Plan      Expected Discharge Date: 01/25/2023                  Nirav Mcpherson MD  Hospitalist Service  [facility]  Securely message with [employer] (more info)  Text page via [employer] Paging/Directory   ______________________________________________________________________    Interval History   Patient continued to be confused since admission, however her respiratory status is greatly improved and she is on room air this morning.  As the day progresses, she becomes more confused.  She also has demonstrated restlessness.  In the evening, she vomited.  She is continued to be afebrile.    Physical Exam   Vital Signs: Temp: 99.5  F (37.5  C) Temp src: Oral BP: (!) 163/63 Pulse: 76   Resp: 20 SpO2: 98 % O2 Device: Oxymask Oxygen Delivery: 2 LPM  Weight: 131 lbs 0 oz    Physical Exam  Vitals and nursing note reviewed.   HENT:      Head: Normocephalic.      Mouth/Throat:      Mouth: Mucous membranes are moist.   Eyes:      General: No scleral icterus.     Pupils: Pupils are equal, round, and reactive to light.   Cardiovascular:      Rate and Rhythm: Normal rate and regular rhythm.      Heart sounds: No murmur heard.  Pulmonary:      Effort: Pulmonary effort is normal.      Breath sounds: Normal breath sounds.   Abdominal:      General: Abdomen is flat. Bowel sounds are normal.      Palpations: Abdomen is soft.   Skin:     General: Skin is warm and dry.      Capillary Refill: Capillary refill takes 2 to 3 seconds.   Neurological:      Mental Status: She is alert. She is disoriented.      Cranial Nerves:  No cranial nerve deficit.          Medical Decision Making             Data     I have personally reviewed the following data over the past 24 hrs:    26.6 (H)  \   10.0 (L)   / 250     138 103 35.9 (H) /  163 (H)   4.5 24 0.60 \       Procal: N/A CRP: N/A Lactic Acid: 0.8

## 2023-01-24 NOTE — ED PROVIDER NOTES
History     Chief Complaint:  Aspiration     The history is provided by the patient and the EMS personnel.      Daya Ignacio is a 89 year old female with a history of NSTEMI, hypertension, iron deficiency anemia, LISSA, and hyperlipidemia who presents with concerns for respiratory distress secondary to staff witnessed aspiration.  On arrival by EMS, patient was hypoxic requiring supplemental oxygen and was in obvious distress.  On arrival here, she continues to require supplemental oxygen via nonrebreather.  She states she does not remember if she had solids or liquids for dinner, but on a reassessment said she thinks she ate chicken and may have choked on it. She reports she is not on oxygen at home. She states she has chronic back pain, but reports no other issues.  She denies chest pain.  She notes she still feels short of breath.  She denies any other concerns.    Her living facility was contacted.  They note she had hamburger and peas and corn for dinner.  They witnessed an apparent choking or aspiration after drinking water.    Independent Historian: Patient and supplemented by EMS   EMS state the patient was choking on her dinner. They report her oxygen saturation was 61% on room air on arrival and they were able to get it up to 89-90% on 15 liters of oxygen en route. Her end-tidal was between 27-31. Her blood pressure was 160/73. Her heart rate was between 40-65 bpm. They state she was coughing and they were able to suction most of the food out.       ROS:  Review of Systems   Respiratory: Positive for choking.    Musculoskeletal: Positive for back pain (chronic).   All other systems reviewed and are negative.      Allergies:  Atorvastatin  Macrobid  Nitrofurantoin  Penicillins  Sulfa Drugs  Sulfasalazine     Medications:    Norvasc  Coreg  Catapres  Neurontin  Dilaudid  Atarax  Isordil  Zestril  Prilosec  Zoloft  Toprol XL  Lipitor  Aspirin 81 mg   Ferrous  "gluconate  Florastor  Norco  Sorbitrate  Sertraline  Vitamin D  Vitamin C    Past Medical History:    Anemia   LISSA  Arthritis   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   Depression   Clostridium difficile colitis  Hypertension   Kidney stone   Macular degeneration (senile) of retina, unspecified   Mild cognitive impairment   Chronic pain syndrome   Recurrent UTI   Upper GI bleed   ESBL  Vitamin D deficiency   Hyperlipidemia   Osteoporosis  Sciatica  PONV  NSTEMI  GERD  Iron deficiency anemia secondary to blood loss (chronic)    Past Surgical History:    Tonsillectomy & Adenoidectomy   Right ORIF tibia  Left cystoscopy and stent   Right hip replacement  Left hip pinning procedure  Left ESWL  EGD  Cystoscopy  Left cystoscopy, lithotripsy, and stent  Bilateral IOL    Family History:    Mother: Alzheimer's disease  Father: cardiovascular issue  Brother: neurologic disorder    Social History:  Patient presents to the ED alone via EMS  PCP: Johann Hall     Physical Exam     Patient Vitals for the past 24 hrs:   BP Temp Temp src Pulse Resp SpO2 Height Weight   01/23/23 2159 -- -- -- 54 -- 97 % -- --   01/23/23 2151 130/45 98  F (36.7  C) Oral 55 16 98 % 1.549 m (5' 1\") 59.4 kg (131 lb)   01/23/23 2115 127/60 -- -- (!) 43 23 100 % -- --   01/23/23 2100 124/61 -- -- 52 11 96 % -- --   01/23/23 2045 122/75 -- -- 67 17 90 % -- --   01/23/23 2015 136/61 -- -- 51 18 97 % -- --   01/23/23 1954 117/64 -- -- 51 26 97 % -- --   01/23/23 1945 121/63 -- -- 52 15 94 % -- --   01/23/23 1930 (!) 113/101 -- -- (!) 48 19 93 % -- --   01/23/23 1915 (!) 89/63 -- -- (!) 45 14 94 % -- --   01/23/23 1900 122/69 -- -- (!) 49 18 95 % -- --   01/23/23 1847 118/64 -- -- (!) 43 19 96 % -- --   01/23/23 1842 129/71 -- -- 51 17 93 % -- --   01/23/23 1840 -- -- -- (!) 42 30 95 % -- --   01/23/23 1835 -- -- -- (!) 47 27 96 % -- --   01/23/23 1830 -- -- -- (!) 48 20 96 % -- --   01/23/23 1825 -- -- -- 54 23 96 % -- --   01/23/23 1823 -- 97.3  F " (36.3  C) Oral -- -- -- -- --   01/23/23 1815 (!) 148/102 -- -- 58 16 93 % -- --        Physical Exam  General: Elderly female sitting upright, gurgling, in respiratory distress  Eyes: PERRL, Conjunctive within normal limits.    ENT: Gurgling speech.  Moist mucous membranes, oropharynx clear.  Uvula fully visible.  Neck: No rigidity.  No palpable crepitus.  CV: Normal S1S2, no murmur, rub or gallop.  Bradycardic, regular.  Resp: Coarse breath sounds bilaterally, diminished on the right compared to the left.  Tachypneic, increased work of breathing.  Speaks in short sentences.    GI: Abdomen is soft, nontender and nondistended. No palpable masses. No rebound or guarding.  MSK:Normal active range of motion.  Skin: Warm and dry. No rashes or lesions or ecchymoses on visible skin.  Neuro: Alert and oriented. Responds appropriately to all questions and commands. No focal findings appreciated.   Psych: Appropriate mood and affect.    Emergency Department Course     ECG  ECG taken at 1825, ECG read at 1825  Sinus bradycardia with sinus arrhythmia  Left axis deviation  Possible anterior infarct, age undetermined   Rate 54 bpm. MT interval 186 ms. QRS duration 88 ms. QT/QTc 454/430 ms. P-R-T axes 66 -31 55.     Imaging:  XR Chest Port 1 View   Final Result   IMPRESSION: Fine linear opacities radiating from the elham into the periphery of both lungs have developed consistent with mild interstitial edema. Heart size and pulmonary vascularity upper normal. Bones are demineralized.         Report per radiology    Laboratory:  Labs Ordered and Resulted from Time of ED Arrival to Time of ED Departure   BASIC METABOLIC PANEL - Abnormal       Result Value    Sodium 134 (*)     Potassium 4.2      Chloride 101      Carbon Dioxide (CO2) 24      Anion Gap 9      Urea Nitrogen 39.7 (*)     Creatinine 0.78      Calcium 9.2      Glucose 135 (*)     GFR Estimate 71     CBC WITH PLATELETS AND DIFFERENTIAL - Abnormal    WBC Count 8.7      RBC  Count 3.26 (*)     Hemoglobin 10.5 (*)     Hematocrit 33.1 (*)      (*)     MCH 32.2      MCHC 31.7      RDW 14.5      Platelet Count 347      % Neutrophils 47      % Lymphocytes 34      % Monocytes 9      % Eosinophils 7      % Basophils 1      % Immature Granulocytes 2      NRBCs per 100 WBC 1 (*)     Absolute Neutrophils 4.1      Absolute Lymphocytes 3.0      Absolute Monocytes 0.8      Absolute Eosinophils 0.6      Absolute Basophils 0.1      Absolute Immature Granulocytes 0.2      Absolute NRBCs 0.0     ISTAT GASES LACTATE VENOUS POCT - Abnormal    Lactic Acid POCT 0.8      Bicarbonate Venous POCT 26      O2 Sat, Venous POCT 29 (*)     pCO2V Venous POCT 63 (*)     pH Venous POCT 7.23 (*)     pO2 Venous POCT 23 (*)       Emergency Department Course & Assessments:       Interventions:  None    Independent Interpretation (X-rays, CTs, rhythm strip):  I reviewed the patient's portable chest x-ray.  I do not see evidence of a pneumothorax, obvious evidence of pulmonary bronchus obstruction, pulmonary infiltrate, or effusion.    Social Determinants of Health affecting care:  Lives in a senior living facility.  Is DNR/DNI and is currently declining offer of bronchoscopy, which limits the ability to detect larger food boluses in the lung passages.  She understands the risk of doing so but prefers to avoid any significant procedures.    Assessments/Consultations/Discussion of Management or Tests:  ED Course as of 01/23/23 2305   Mon Jan 23, 2023 1806 I obtained history and examined the patient as noted above. Dr. Sepulveda was also in the room with me.   1816 Chest-xray was performed.   1823 I rechecked the patient and explained findings. Patient states she's pretty sure she ate chicken. She states she does not want a bronchoscopy or other similar procedures.    1848 I spoke with Dr. Byrd, hospitalist, who agreed to admit the patient.    1850 I rechecked the patient. She is down to 8 liters of oxygen via  Non-Rebreather Oxygen Mask. She is resting and appears comfortable.    2003 I rechecked the patient and explained findings to her son.     Disposition:  The patient was admitted to the hospital under the care of Dr. Byrd.     Impression & Plan      Medical Decision Making:  Daya Morin is a 89-year-old female, DNR/DNI, presents emergency department with aspiration of fluids, solids or combination.  On arrival, she was in respiratory distress and hypoxic.  She is requiring supplemental oxygen with nonrebreather.  #Her preference was to avoid intubation, this was not performed.  With aggressive suctioning by RT, food material was removed from the upper airway.  This seemed to improve her gurgling speech.  She still required supplemental oxygen but down trended in amount.  Frequent suctioning by RT was performed.   She was recommended bronchoscopy given concern for possible food bolus aspiration, but declined at, after explanation of procedure with possible benefits, acknowledging the risk of declining this procedure and continuing to explain her desire to avoid any significant procedures.  She was observed frequently for any decompensation, but remained stable while in the ED.  She required ongoing supplementation by nonrebreather mask and is admitted to a medical bed.    Critical Care time:  was 31 minutes for this patient excluding procedures.    Diagnosis:    ICD-10-CM    1. Acute respiratory failure with hypoxia (H)  J96.01       2. Aspiration pneumonitis (H)  J69.0       3. Sinus bradycardia  R00.1          Scribe Disclosure:  I, Kevin Tolbert, am serving as a scribe at 6:20 PM on 1/23/2023 to document services personally performed by Evelyne Lee MD based on my observations and the provider's statements to me.     1/23/2023   Evelyne Lee MD Jonkman, Tracy Dianne, MD  01/23/23 5090

## 2023-01-24 NOTE — PROGRESS NOTES
"   01/24/23 0842   Appointment Info   Signing Clinician's Name / Credentials (SLP) Bonita Hsu MS CCC-SLP   General Information   Onset of Illness/Injury or Date of Surgery 01/23/23   Referring Physician Dr. Byrd   Patient/Family Therapy Goal Statement (SLP) To eat/drink   Pertinent History of Current Problem   Per H&P \"Daya Ignacio is a 89 year old female with known prior history of hypertension, prior C. difficile colitis, chronic pain on chronic narcotics, multiple compression fractures in the past, ESBL UTI, anxiety, depression, cognitive impairment and reportedly lives in an assisted living facility set up and unfortunately has to presents in the emergency room by emergency personnel earlier after she was found having jerking-like sensation and concerns for aspiration while partaking her dinner food tray.\" Pt currently admitted with acute hypoxic respiratory failure, aspiration event, suspected aspiration pneumonitis. SLP consulted for swallow evaluation.     General Observations   Pt alert; talking out loud upon arival though no one else in room - though someone was sleeping next to her; positioned upright for assessment     Pain Assessment   Patient Currently in Pain No   Type of Evaluation   Type of Evaluation Swallow Evaluation   Oral Motor   Oral Musculature generally intact   Structural Abnormalities none present   Mucosal Quality adequate   Vocal Quality/Secretion Management (Oral Motor)   Vocal Quality (Oral Motor) wet/gurgly   Secretion Management (Oral Motor) wet/gurgly vocal quality;unaware of wet vocal quality   General Swallowing Observations   Past History of Dysphagia None per EMR. Pt reports chronic dyshagia where food sticks in her throat but it usually goes down when she drinks liquids. She has excellent awareness of incident last nigt during dinner and reports that has never happened before.   Respiratory Support (General Swallowing Observations) oxygen mask  (2L)   Current Diet/Method " of Nutritional Intake (General Swallowing Observations, NIS) NPO   Swallowing Evaluation Clinical swallow evaluation   Clinical Swallow Evaluation   Feeding Assistance minimal assistance required   Additional evaluation(s) completed today Yes;Recommended   Rationale for completing additional evaluation Signs/sx pharyngeal vs esophageal dysphagia and aspiration noted at bedside   Clinical Swallow Evaluation Textures Trialed thin liquids;mildly thick liquids;pureed   Clinical Swallow Eval: Thin Liquid Texture Trial   Mode of Presentation, Thin Liquids spoon;cup   Volume of Liquid or Food Presented ice chips x3, cup sip x1   Oral Phase of Swallow premature pharyngeal entry   Pharyngeal Phase of Swallow impaired;reduction in laryngeal movement;repeated swallows;throat clearing   Diagnostic Statement increased WOB, wheezing, throat clearing after 100 % of trials; double swallows consistently.   Clinical Swallow Eval: Mildly Thick Liquids   Mode of Presentation spoon;fed by clinician   Volume Presented x3 tsps   Oral Phase premature pharyngeal entry   Pharyngeal Phase impaired;reduction in laryngeal movement;repeated swallows;throat clearing   Diagnostic Statement increased WOB, wheezing, throat clearing after 100 % of trials; 2-3 swallows consistently per bolus, pt reporting sticking sensation   Clinical Swallow Evaluation: Puree Solid Texture Trial   Mode of Presentation, Puree spoon;fed by clinician   Volume of Puree Presented x1 bite   Oral Phase, Puree WFL   Pharyngeal Phase, Puree impaired;reduction in laryngeal movement;repeated swallows;throat clearing   Diagnostic Statement increased WOB, wheezing, throat clearing after 100 % of trials; 2-3 swallows consistently per bolus, pt reporting sticking sensation   Esophageal Phase of Swallow   Patient reports or presents with symptoms of esophageal dysphagia Yes   Esophageal comments sticking sensation could be pharyngeal vs esophageal   Swallowing Recommendations   Diet  Consistency Recommendations NPO   Medication Administration Recommendations, Swallowing (SLP) non-oral currently   Instrumental Assessment Recommendations VFSS (videofluoroscopic swallowing study)   Clinical Impression   Criteria for Skilled Therapeutic Interventions Met (SLP Eval) Yes, treatment indicated   SLP Diagnosis mild oral, suspected pharyngeal vs esophageal dysphagia   Risks & Benefits of therapy have been explained evaluation/treatment results reviewed;care plan/treatment goals reviewed;risks/benefits reviewed;current/potential barriers reviewed;participants voiced agreement with care plan;participants included;patient   Clinical Impression Comments   Clinical swallow evaluation completed. Pt currently presents with mild oral, suspected pharyngeal vs esophageal dysphagia. Deficits include: supected premature spillage, ?reduced laryngeal elevation to palpation, multiple swallows per small bolus which could be 2/2 potential laryngeal penetration vs residuals and signs/sx potential aspiration: increased WOB, wheezing, throat clearing after 100 % of trials, across thin, mildly thick and puree trials. Educated pt on dysphagia/aspiration concerns and recommendation for VFSS - pt verbalized agreement with testing.     SLP Total Evaluation Time   Eval: oral/pharyngeal swallow function, clinical swallow Minutes (02747) 19   SLP Discharge Planning   SLP Plan VFSS   SLP Discharge Recommendation Long term care facility   SLP Rationale for DC Rec Anticipate back to LTC facility, likely will need SLP f/u but TBD pending VFSS today   SLP Brief overview of current status  strict NPO (no ice chips) with oral cares until VFSS at 1100 today.   Total Session Time   Total Session Time (sum of timed and untimed services) 19

## 2023-01-25 ENCOUNTER — APPOINTMENT (OUTPATIENT)
Dept: OCCUPATIONAL THERAPY | Facility: CLINIC | Age: 88
DRG: 177 | End: 2023-01-25
Attending: FAMILY MEDICINE
Payer: COMMERCIAL

## 2023-01-25 ENCOUNTER — APPOINTMENT (OUTPATIENT)
Dept: SPEECH THERAPY | Facility: CLINIC | Age: 88
DRG: 177 | End: 2023-01-25
Payer: COMMERCIAL

## 2023-01-25 LAB
ANION GAP SERPL CALCULATED.3IONS-SCNC: 7 MMOL/L (ref 7–15)
BACTERIA UR CULT: ABNORMAL
BASOPHILS # BLD AUTO: 0 10E3/UL (ref 0–0.2)
BASOPHILS NFR BLD AUTO: 0 %
BUN SERPL-MCNC: 23.4 MG/DL (ref 8–23)
CALCIUM SERPL-MCNC: 9.4 MG/DL (ref 8.2–9.6)
CHLORIDE SERPL-SCNC: 106 MMOL/L (ref 98–107)
CREAT SERPL-MCNC: 0.43 MG/DL (ref 0.51–0.95)
DEPRECATED HCO3 PLAS-SCNC: 25 MMOL/L (ref 22–29)
EOSINOPHIL # BLD AUTO: 0.1 10E3/UL (ref 0–0.7)
EOSINOPHIL NFR BLD AUTO: 0 %
ERYTHROCYTE [DISTWIDTH] IN BLOOD BY AUTOMATED COUNT: 14.9 % (ref 10–15)
GFR SERPL CREATININE-BSD FRML MDRD: >90 ML/MIN/1.73M2
GLUCOSE SERPL-MCNC: 112 MG/DL (ref 70–99)
HCT VFR BLD AUTO: 26.6 % (ref 35–47)
HGB BLD-MCNC: 8.6 G/DL (ref 11.7–15.7)
IMM GRANULOCYTES # BLD: 0.1 10E3/UL
IMM GRANULOCYTES NFR BLD: 1 %
LYMPHOCYTES # BLD AUTO: 0.8 10E3/UL (ref 0.8–5.3)
LYMPHOCYTES NFR BLD AUTO: 6 %
MCH RBC QN AUTO: 32.8 PG (ref 26.5–33)
MCHC RBC AUTO-ENTMCNC: 32.3 G/DL (ref 31.5–36.5)
MCV RBC AUTO: 102 FL (ref 78–100)
MONOCYTES # BLD AUTO: 0.6 10E3/UL (ref 0–1.3)
MONOCYTES NFR BLD AUTO: 4 %
NEUTROPHILS # BLD AUTO: 13.3 10E3/UL (ref 1.6–8.3)
NEUTROPHILS NFR BLD AUTO: 89 %
NRBC # BLD AUTO: 0 10E3/UL
NRBC BLD AUTO-RTO: 0 /100
PLATELET # BLD AUTO: 221 10E3/UL (ref 150–450)
POTASSIUM SERPL-SCNC: 3.9 MMOL/L (ref 3.4–5.3)
PROCALCITONIN SERPL IA-MCNC: 0.05 NG/ML
RBC # BLD AUTO: 2.62 10E6/UL (ref 3.8–5.2)
SODIUM SERPL-SCNC: 138 MMOL/L (ref 136–145)
WBC # BLD AUTO: 14.8 10E3/UL (ref 4–11)

## 2023-01-25 PROCEDURE — 999N000157 HC STATISTIC RCP TIME EA 10 MIN

## 2023-01-25 PROCEDURE — 250N000013 HC RX MED GY IP 250 OP 250 PS 637: Performed by: FAMILY MEDICINE

## 2023-01-25 PROCEDURE — 85004 AUTOMATED DIFF WBC COUNT: CPT | Performed by: INTERNAL MEDICINE

## 2023-01-25 PROCEDURE — 97535 SELF CARE MNGMENT TRAINING: CPT | Mod: GO | Performed by: REHABILITATION PRACTITIONER

## 2023-01-25 PROCEDURE — 250N000009 HC RX 250: Performed by: FAMILY MEDICINE

## 2023-01-25 PROCEDURE — 250N000013 HC RX MED GY IP 250 OP 250 PS 637: Performed by: INTERNAL MEDICINE

## 2023-01-25 PROCEDURE — 80048 BASIC METABOLIC PNL TOTAL CA: CPT | Performed by: FAMILY MEDICINE

## 2023-01-25 PROCEDURE — 120N000001 HC R&B MED SURG/OB

## 2023-01-25 PROCEDURE — 92526 ORAL FUNCTION THERAPY: CPT | Mod: GN

## 2023-01-25 PROCEDURE — 87040 BLOOD CULTURE FOR BACTERIA: CPT | Performed by: INTERNAL MEDICINE

## 2023-01-25 PROCEDURE — 36415 COLL VENOUS BLD VENIPUNCTURE: CPT | Performed by: INTERNAL MEDICINE

## 2023-01-25 PROCEDURE — 99233 SBSQ HOSP IP/OBS HIGH 50: CPT | Performed by: INTERNAL MEDICINE

## 2023-01-25 PROCEDURE — 97166 OT EVAL MOD COMPLEX 45 MIN: CPT | Mod: GO | Performed by: REHABILITATION PRACTITIONER

## 2023-01-25 PROCEDURE — 84145 PROCALCITONIN (PCT): CPT | Performed by: INTERNAL MEDICINE

## 2023-01-25 PROCEDURE — 250N000011 HC RX IP 250 OP 636: Performed by: INTERNAL MEDICINE

## 2023-01-25 PROCEDURE — 94640 AIRWAY INHALATION TREATMENT: CPT

## 2023-01-25 PROCEDURE — 36415 COLL VENOUS BLD VENIPUNCTURE: CPT | Performed by: FAMILY MEDICINE

## 2023-01-25 RX ORDER — QUETIAPINE FUMARATE 25 MG/1
25 TABLET, FILM COATED ORAL 2 TIMES DAILY
Status: DISCONTINUED | OUTPATIENT
Start: 2023-01-25 | End: 2023-01-26

## 2023-01-25 RX ORDER — IPRATROPIUM BROMIDE AND ALBUTEROL SULFATE 2.5; .5 MG/3ML; MG/3ML
3 SOLUTION RESPIRATORY (INHALATION) EVERY 4 HOURS PRN
Status: DISCONTINUED | OUTPATIENT
Start: 2023-01-25 | End: 2023-01-26

## 2023-01-25 RX ADMIN — CLONIDINE HYDROCHLORIDE 0.1 MG: 0.1 TABLET ORAL at 21:12

## 2023-01-25 RX ADMIN — HYDROXYZINE HYDROCHLORIDE 10 MG: 10 TABLET ORAL at 09:16

## 2023-01-25 RX ADMIN — ISOSORBIDE DINITRATE 10 MG: 10 TABLET ORAL at 15:16

## 2023-01-25 RX ADMIN — AMLODIPINE BESYLATE 10 MG: 10 TABLET ORAL at 09:16

## 2023-01-25 RX ADMIN — CARVEDILOL 25 MG: 25 TABLET, FILM COATED ORAL at 21:12

## 2023-01-25 RX ADMIN — MICONAZOLE NITRATE: 20 POWDER TOPICAL at 21:21

## 2023-01-25 RX ADMIN — PANTOPRAZOLE SODIUM 40 MG: 40 TABLET, DELAYED RELEASE ORAL at 09:15

## 2023-01-25 RX ADMIN — ISOSORBIDE DINITRATE 10 MG: 10 TABLET ORAL at 09:16

## 2023-01-25 RX ADMIN — GABAPENTIN 300 MG: 300 CAPSULE ORAL at 09:15

## 2023-01-25 RX ADMIN — TAZOBACTAM SODIUM AND PIPERACILLIN SODIUM 3.38 G: 375; 3 INJECTION, SOLUTION INTRAVENOUS at 10:32

## 2023-01-25 RX ADMIN — ACETAMINOPHEN 650 MG: 325 TABLET, FILM COATED ORAL at 09:16

## 2023-01-25 RX ADMIN — ACETAMINOPHEN 650 MG: 325 TABLET, FILM COATED ORAL at 21:14

## 2023-01-25 RX ADMIN — SERTRALINE HYDROCHLORIDE 100 MG: 100 TABLET ORAL at 09:16

## 2023-01-25 RX ADMIN — ISOSORBIDE DINITRATE 10 MG: 10 TABLET ORAL at 21:12

## 2023-01-25 RX ADMIN — QUETIAPINE FUMARATE 25 MG: 25 TABLET ORAL at 21:12

## 2023-01-25 RX ADMIN — GABAPENTIN 300 MG: 300 CAPSULE ORAL at 15:16

## 2023-01-25 RX ADMIN — QUETIAPINE FUMARATE 25 MG: 25 TABLET ORAL at 10:32

## 2023-01-25 RX ADMIN — MICONAZOLE NITRATE: 20 POWDER TOPICAL at 10:32

## 2023-01-25 RX ADMIN — ACETAMINOPHEN 650 MG: 325 TABLET, FILM COATED ORAL at 15:16

## 2023-01-25 RX ADMIN — Medication 250 MG: at 09:16

## 2023-01-25 RX ADMIN — GABAPENTIN 300 MG: 300 CAPSULE ORAL at 21:20

## 2023-01-25 RX ADMIN — IPRATROPIUM BROMIDE AND ALBUTEROL SULFATE 3 ML: 2.5; .5 SOLUTION RESPIRATORY (INHALATION) at 07:52

## 2023-01-25 RX ADMIN — TAZOBACTAM SODIUM AND PIPERACILLIN SODIUM 3.38 G: 375; 3 INJECTION, SOLUTION INTRAVENOUS at 21:24

## 2023-01-25 RX ADMIN — LISINOPRIL 30 MG: 20 TABLET ORAL at 09:15

## 2023-01-25 RX ADMIN — HYDROXYZINE HYDROCHLORIDE 10 MG: 10 TABLET ORAL at 21:12

## 2023-01-25 RX ADMIN — CARVEDILOL 12.5 MG: 12.5 TABLET, FILM COATED ORAL at 09:16

## 2023-01-25 RX ADMIN — TAZOBACTAM SODIUM AND PIPERACILLIN SODIUM 3.38 G: 375; 3 INJECTION, SOLUTION INTRAVENOUS at 15:13

## 2023-01-25 RX ADMIN — SENNOSIDES AND DOCUSATE SODIUM 1 TABLET: 50; 8.6 TABLET ORAL at 21:14

## 2023-01-25 ASSESSMENT — ACTIVITIES OF DAILY LIVING (ADL)
ADLS_ACUITY_SCORE: 53
ADLS_ACUITY_SCORE: 49
ADLS_ACUITY_SCORE: 53
ADLS_ACUITY_SCORE: 49
ADLS_ACUITY_SCORE: 53
ADLS_ACUITY_SCORE: 49
ADLS_ACUITY_SCORE: 53

## 2023-01-25 NOTE — PLAN OF CARE
End of Shift Summary  For vital signs and complete assessments, please see documentation flowsheets.     Pertinent assessments: Oriented to self only. VSS. Denies pain/SOB. Infrequent congested cough. Lungs diminished. Up to BSC with 2 assist to pivot, BM x2, urine void x2. Sleeping between cares, bed alarm on for safety.     Major Shift Events Uneventful    Treatment Plan: IVF, Speech    Bedside Nurse: Renetta Mendenhall RN

## 2023-01-25 NOTE — PROGRESS NOTES
[facility]    Medicine Progress Note - Hospitalist Service    Date of Admission:  1/23/2023    Assessment & Plan      Daya Ignacio is a 89 year old female with known prior history of hypertension, prior C. difficile colitis, chronic pain on chronic narcotics, multiple compression fractures in the past, ESBL UTI, anxiety, depression, cognitive impairment and reportedly lives in an assisted living facility set up and unfortunately has to presents in the emergency room by emergency personnel earlier after she was found having jerking-like sensation and concerns for aspiration while partaking her dinner food tray.  During EMS evaluation she was found to be hypoxic at 61% via room air but were able to increase to close to 90% utilizing 15 L of oxygen in route here    Afternoon of 1/24/2023 she had worsening mental status, which culminated in vomiting.  Further work-up is being pursued.    Problem list:  Principal Problem:    Aspiration pneumonitis (H)  Possible aspiration pneumonia  Worsening leukocytosis  Active Problems:    Hypertension    Acute respiratory failure with hypoxia (H)  -Now on room air  -Send for blood cultures  -Added procalcitonin  -Repeat CBC this morning  -With her abnormal UA and prior history of ESBL, and now with worsening leukocytosis and mental status changes.  Will cover with IV Zosyn for now  -Appreciate input from SLP services.  Currently on modified assisted feeding diet.  -Restart antihypertensives, PRN hydralazine ordered    Altered mental status, etiology unclear at this time.  Differential includes delirium, postictal state  -No obvious seizure-like activity  -Reassuring CT of the head  -Can be infectious encephalopathy    Chronic pain on chronic narcotics  -I reviewed her electronic medical records, home regimen and she is able to tolerate narcotics and opioids in the past.  -Endorsing no worsening pain issues.  I discontinued oral hydromorphone and IV  hydromorphone and continued on her home regimen of hydrocodone given worsening mental status changes.    Will need social service evaluation as well for discharge planning disposition         Diet: Combination Diet Soft and Bite Sized Diet (level 6); Thin Liquids (level 0) (1:1 supervision for PO, ensure pt fully chewing/clearing oral cavity before next bite, fully upright, slow rate, liquid wash PRN.)    DVT Prophylaxis: Pneumatic Compression Devices  Almanza Catheter: Not present  Lines: None     Cardiac Monitoring: None  Code Status: No CPR- Do NOT Intubate      Clinically Significant Risk Factors                                Disposition Plan     Expected Discharge Date: Still needing inpatient care.  Anticipating at least another 2 days in the hospital.  Destination: assisted living if feasible          Family updated over the phone as I spoke with patient's son and provided him with updates and plan of care.    Prakash Byrd MD, MD  Hospitalist Service  [facility]  Securely message with [email or web address] (more info)  Text page via [employer] Paging/Directory   ______________________________________________________________________    Interval History   I resume service care today.  Seen and examined.  Chart reviewed.  Case discussed with nursing service was gracious enough to present at bedside during my encounter.  It was reported she was having hallucinations and getting more confused earlier.  During the time of encounter she is not back on her baseline mental state but was following some simple verbal instructions.  She is not agitated nor combative.  Fortunately not requiring any oxygen support, afebrile.  No reported nausea, vomiting.  She endorses no abdominal pain, no reported diarrhea or any bleeding tendencies.    Physical Exam   Vital Signs: Temp: 98.4  F (36.9  C) Temp src: Oral BP: (!) 176/70 Pulse: 86   Resp: 24 SpO2: 91 % O2 Device: None (Room air) Oxygen Delivery: 1 LPM  Weight: 131  lbs 0 oz    HEENT; Atraumatic, normocephalic, pinkish conjuctiva, pupils bilateral reactive   Skin: warm and moist, no rashes  Lungs: equal chest expansion, clear to auscultation, no wheezes, no stridor, no crackles,   Heart: normal rate, normal rhythm, no rubs or gallops.   Abdomen: normal bowel sounds, no tenderness, no peritoneal signs, no guarding  Extremities: no deformities, no edema   Neuro; follow commands, alert and oriented x1, spontaneous speech, coherent, moves all extremities spontaneously  Psych; no hallucination, euthymic mood, not agitated        Medical Decision Making       50 MINUTES SPENT BY ME on the date of service doing chart review, history, exam, documentation & further activities per the note.  MANAGEMENT DISCUSSED with the following over the past 24 hours: yes   NOTE(S)/MEDICAL RECORDS REVIEWED over the past 24 hours: yes  Tests ORDERED & REVIEWED in the past 24 hours:      Data     I have personally reviewed the following data over the past 24 hrs:    N/A  \   N/A   / N/A     138 106 23.4 (H) /  112 (H)   3.9 25 0.43 (L) \       ALT: N/A AST: N/A AP: N/A TBILI: N/A   ALB: N/A TOT PROTEIN: N/A LIPASE: N/A       Imaging results reviewed over the past 24 hrs:   Recent Results (from the past 24 hour(s))   XR Video Swallow with SLP or OT    Narrative    XR VIDEO SWALLOW WITH SLP OR OT   1/24/2023 11:52 AM     HISTORY: dysphagia    COMPARISON: None.    FINDINGS:  A swallow study was performed in coordination with a member  from the speech pathology service. Total fluoroscopy time was .5  minutes. 5 spot fluoroscopic images, and/or cine clips were obtained.  1 view in lateral projection. Various consistencies of barium were  given to the patient to swallow, and the swallowing mechanism was  observed using fluoroscopy.    Penetration with thin liquids. No visible aspiration.      Impression    IMPRESSION:  1. Penetration with thin liquids. No aspiration identified.  2. Please refer to the speech  pathology report for further details.    This study includes only the cervical esophagus.    DINORAH SO MD         SYSTEM ID:  [id number]   CT Head w/o Contrast    Narrative    EXAM: CT HEAD W/O CONTRAST  LOCATION: [facility]  DATE/TIME: 1/24/2023 5:41 PM    INDICATION: Altered mental status, vomiting, possible seizure prior to admission 24 hours ago  COMPARISON:  CT head 07/13/2019.  TECHNIQUE: Routine CT Head without IV contrast. Multiplanar reformats. Dose reduction techniques were used.    FINDINGS:  INTRACRANIAL CONTENTS: No intracranial hemorrhage, extraaxial collection, or mass effect.  No CT evidence of acute infarct. Mild to moderate presumed chronic small vessel ischemic changes. Moderate generalized volume loss. No hydrocephalus.     VISUALIZED ORBITS/SINUSES/MASTOIDS: Prior bilateral cataract surgery. Visualized portions of the orbits are otherwise unremarkable. No paranasal sinus mucosal disease. No middle ear or mastoid effusion.    BONES/SOFT TISSUES: No acute abnormality.      Impression    IMPRESSION:  1.  No CT evidence for acute intracranial process.  2.  Brain atrophy and presumed chronic microvascular ischemic changes, mildly progressed since 2019.

## 2023-01-25 NOTE — CONSULTS
Care Management Follow Up    Length of Stay (days): 2    Expected Discharge Date: 01/27/2023     Concerns to be Addressed:       Patient plan of care discussed at interdisciplinary rounds: Yes    Anticipated Discharge Disposition: Assisted Living     Anticipated Discharge Services:    Anticipated Discharge DME:      Patient/family educated on Medicare website which has current facility and service quality ratings:    Education Provided on the Discharge Plan:    Patient/Family in Agreement with the Plan:      Referrals Placed by CM/SW:    Private pay costs discussed: Not applicable    Additional Information:    Patient's outpatient Care Coordinator from [facility] left message for update. Attempted to call, left message    Erinn Franco, RN, BSN, CM  Inpatient Care Coordination  [facility]  [phone]

## 2023-01-25 NOTE — PLAN OF CARE
Goal Outcome Evaluation:      Plan of Care Reviewed With: patient, child      ICU End of Shift Summary.  For vital signs and complete assessments, please see documentation flowsheets.      Pertinent assessments: Confused. Lethargic. Weak. Restless. [place]. Afebrile. VSS. 1LNC intermittent when laying flat. Congested cough. Bite site soft foods with thin liquids. BS+ BM x1.     Major Shift Events: -CT of head completed. See results                                    -N&V. Unable to keep eds, fluid or food down                                    -urinary retention; straight cath x1 successful     Plan (Upcoming Events): Continue to monitor.     Discharge/Transfer Needs: TBD     Bedside Shift Report Completed : yes  Bedside Safety Check Completed: yes

## 2023-01-25 NOTE — PROGRESS NOTES
01/25/23 0900   Appointment Info   Signing Clinician's Name / Credentials (OT) Alberta Arredondo, OTR/L   Living Environment   People in Home facility resident   Current Living Arrangements assisted living   Living Environment Comments patient unable to provided information about living set up   Self-Care   Equipment Currently Used at Home wheelchair, manual   Activity/Exercise/Self-Care Comment Unknown, patient unable to state   General Information   Onset of Illness/Injury or Date of Surgery 01/23/23   Referring Physician Dr. Byrd   Patient/Family Therapy Goal Statement (OT) unable to state   Additional Occupational Profile Info/Pertinent History of Current Problem Daya Ignacio is a 89 year old female with known prior history of hypertension, prior C. difficile colitis, chronic pain on chronic narcotics, multiple compression fractures in the past, ESBL UTI, anxiety, depression, cognitive impairment and reportedly lives in an assisted living facility set up and unfortunately has to presents in the emergency room by emergency personnel earlier after she was found having jerking-like sensation and concerns for aspiration while partaking her dinner food tray.  During EMS evaluation she was found to be hypoxic at 61% via room air but were able to increase to close to 90% utilizing 15 L of oxygen in route here   General Observations and Info patient in bed, per RN had attempted to climb OOB   Cognitive Status Examination   Orientation Status person   Affect/Mental Status (Cognitive) confused   Follows Commands does not follow one-step commands   Cognitive Status Comments patient has known cognitive deficits, currently hallucinating, reporting seeing bugs all over floor and walls, mentions seeing a man in her room. RN and MD updated.   Pain Assessment   Patient Currently in Pain Yes, see Vital Sign flowsheet  (patient is reporting LBP with movement and B legs)   Range of Motion Comprehensive   Comment, General Range  of Motion unable to formally assess, appears to be WFL during observation with general movement   Strength Comprehensive (MMT)   Comment, General Manual Muscle Testing (MMT) Assessment appears to general weak   Muscle Tone Assessment   Muscle Tone Quick Adds No deficits were identified   Bed Mobility   Comment (Bed Mobility) max A x2 for supine<>sit, Ax2 to boost in to bed   Transfers   Transfer Comments patient admantly declined to transfer to chair, insisted on return to bed   Balance   Balance Comments decreased sitting balance, A x2 to maintain   Activities of Daily Living   BADL Assessment/Intervention bathing;lower body dressing;grooming;feeding;toileting   Bathing Assessment/Intervention   [place] Level (Bathing) unable to perform   Lower Body Dressing Assessment/Training   [place] Level (Lower Body Dressing) unable to perform   Grooming Assessment/Training   [place] Level (Grooming) unable to perform   Eating/Self Feeding   [place] Level (Feeding) unable to perform   Toileting   [place] Level (Toileting) unable to perform   Clinical Impression   Criteria for Skilled Therapeutic Interventions Met (OT) Yes, treatment indicated   OT Diagnosis decreased ADLs   OT Problem List-Impairments impacting ADL activity tolerance impaired;balance;cognition;inability to direct their own care;strength;pain   Assessment of Occupational Performance 3-5 Performance Deficits   Identified Performance Deficits feeding, g/h, dressing, toileting   Planned Therapy Interventions (OT) ADL retraining;progressive activity/exercise;cognition;transfer training;strengthening   Clinical Decision Making Complexity (OT) moderate complexity   Risk & Benefits of therapy have been explained evaluation/treatment results reviewed;care plan/treatment goals reviewed;risks/benefits reviewed;current/potential barriers reviewed;patient   OT Total Evaluation Time   OT Eval, Moderate Complexity Minutes (99767) 10   OT Goals    Therapy Frequency (OT) 5 times/wk   OT Predicted Duration/Target Date for Goal Attainment 02/01/23   OT Goals Hygiene/Grooming;Upper Body Dressing;Toilet Transfer/Toileting;OT Goal 1   OT: Hygiene/Grooming moderate assist  (to complete 1-2 tasks with vc's as needed)   OT: Upper Body Dressing Moderate assist   OT: Toilet Transfer/Toileting Moderate assist;using adaptive equipment  (to bedside commode)   OT: Goal 1 Patient to feed self with mod A and vc's as needed.   Self-Care/Home Management   Self-Care/Home Mgmt/ADL, Compensatory, Meal Prep Minutes (47609) 20   Symptoms Noted During/After Treatment (Meal Preparation/Planning Training) none   Treatment Detail/Skilled Intervention Patient not oriented throughout session, Attempted functional mobility and activity with patient, however she was unable to participate or resistant to activity. Patient deemed unsafe to feed self d/t increased level of confusion. Several technqiues used to engage patient in functional activity which were unsuccessful. Patient set up with lights on in room, in semi seated position (what was tolerable for LBP) to help with any possible deliurim. RN updated on status.   OT Discharge Planning   OT Plan feeding, montior cogntiive, following single step commands for g/h, progress to EOB activity and commode transfer as able   OT Discharge Recommendation (DC Rec) Transitional Care Facility   OT Rationale for DC Rec patient appears well below baseline for basic ADLs. Patient is confused, halucinating and resistant to OOB activity d/t to LBP, unable to assess transfers.   OT Brief overview of current status unable to assess mobility or functional ADls d/t increased confusion and resistance from patient.   Total Session Time   Timed Code Treatment Minutes 20   Total Session Time (sum of timed and untimed services) 30

## 2023-01-26 ENCOUNTER — APPOINTMENT (OUTPATIENT)
Dept: SPEECH THERAPY | Facility: CLINIC | Age: 88
DRG: 177 | End: 2023-01-26
Payer: COMMERCIAL

## 2023-01-26 ENCOUNTER — APPOINTMENT (OUTPATIENT)
Dept: GENERAL RADIOLOGY | Facility: CLINIC | Age: 88
DRG: 177 | End: 2023-01-26
Attending: INTERNAL MEDICINE
Payer: COMMERCIAL

## 2023-01-26 LAB
ANION GAP SERPL CALCULATED.3IONS-SCNC: 9 MMOL/L (ref 7–15)
BASE EXCESS BLDV CALC-SCNC: -0.4 MMOL/L (ref -7.7–1.9)
BASOPHILS # BLD AUTO: 0 10E3/UL (ref 0–0.2)
BASOPHILS NFR BLD AUTO: 0 %
BUN SERPL-MCNC: 25 MG/DL (ref 8–23)
CALCIUM SERPL-MCNC: 9.3 MG/DL (ref 8.2–9.6)
CHLORIDE SERPL-SCNC: 108 MMOL/L (ref 98–107)
CREAT SERPL-MCNC: 0.84 MG/DL (ref 0.51–0.95)
CRP SERPL-MCNC: 114.89 MG/L
DEPRECATED HCO3 PLAS-SCNC: 24 MMOL/L (ref 22–29)
EOSINOPHIL # BLD AUTO: 0.2 10E3/UL (ref 0–0.7)
EOSINOPHIL NFR BLD AUTO: 2 %
ERYTHROCYTE [DISTWIDTH] IN BLOOD BY AUTOMATED COUNT: 15.5 % (ref 10–15)
FLUAV RNA SPEC QL NAA+PROBE: NEGATIVE
FLUBV RNA RESP QL NAA+PROBE: NEGATIVE
GFR SERPL CREATININE-BSD FRML MDRD: 65 ML/MIN/1.73M2
GLUCOSE BLDC GLUCOMTR-MCNC: 110 MG/DL (ref 70–99)
GLUCOSE BLDC GLUCOMTR-MCNC: 150 MG/DL (ref 70–99)
GLUCOSE BLDC GLUCOMTR-MCNC: 150 MG/DL (ref 70–99)
GLUCOSE SERPL-MCNC: 97 MG/DL (ref 70–99)
HCO3 BLDV-SCNC: 25 MMOL/L (ref 21–28)
HCT VFR BLD AUTO: 24 % (ref 35–47)
HGB BLD-MCNC: 7.6 G/DL (ref 11.7–15.7)
IMM GRANULOCYTES # BLD: 0.1 10E3/UL
IMM GRANULOCYTES NFR BLD: 1 %
LYMPHOCYTES # BLD AUTO: 0.7 10E3/UL (ref 0.8–5.3)
LYMPHOCYTES NFR BLD AUTO: 6 %
MCH RBC QN AUTO: 32.9 PG (ref 26.5–33)
MCHC RBC AUTO-ENTMCNC: 31.7 G/DL (ref 31.5–36.5)
MCV RBC AUTO: 104 FL (ref 78–100)
MONOCYTES # BLD AUTO: 0.7 10E3/UL (ref 0–1.3)
MONOCYTES NFR BLD AUTO: 7 %
NEUTROPHILS # BLD AUTO: 9.2 10E3/UL (ref 1.6–8.3)
NEUTROPHILS NFR BLD AUTO: 84 %
NRBC # BLD AUTO: 0 10E3/UL
NRBC BLD AUTO-RTO: 0 /100
O2/TOTAL GAS SETTING VFR VENT: 1 %
PCO2 BLDV: 43 MM HG (ref 40–50)
PH BLDV: 7.37 [PH] (ref 7.32–7.43)
PLATELET # BLD AUTO: 157 10E3/UL (ref 150–450)
PO2 BLDV: 72 MM HG (ref 25–47)
POTASSIUM SERPL-SCNC: 4.2 MMOL/L (ref 3.4–5.3)
RBC # BLD AUTO: 2.31 10E6/UL (ref 3.8–5.2)
RSV RNA SPEC NAA+PROBE: NEGATIVE
SARS-COV-2 RNA RESP QL NAA+PROBE: POSITIVE
SODIUM SERPL-SCNC: 141 MMOL/L (ref 136–145)
WBC # BLD AUTO: 10.9 10E3/UL (ref 4–11)

## 2023-01-26 PROCEDURE — 250N000013 HC RX MED GY IP 250 OP 250 PS 637: Performed by: FAMILY MEDICINE

## 2023-01-26 PROCEDURE — 999N000157 HC STATISTIC RCP TIME EA 10 MIN

## 2023-01-26 PROCEDURE — 36415 COLL VENOUS BLD VENIPUNCTURE: CPT | Performed by: INTERNAL MEDICINE

## 2023-01-26 PROCEDURE — 71045 X-RAY EXAM CHEST 1 VIEW: CPT

## 2023-01-26 PROCEDURE — 94640 AIRWAY INHALATION TREATMENT: CPT

## 2023-01-26 PROCEDURE — 250N000009 HC RX 250: Performed by: INTERNAL MEDICINE

## 2023-01-26 PROCEDURE — 120N000001 HC R&B MED SURG/OB

## 2023-01-26 PROCEDURE — 87637 SARSCOV2&INF A&B&RSV AMP PRB: CPT | Performed by: INTERNAL MEDICINE

## 2023-01-26 PROCEDURE — 94640 AIRWAY INHALATION TREATMENT: CPT | Mod: 76

## 2023-01-26 PROCEDURE — 86140 C-REACTIVE PROTEIN: CPT | Performed by: INTERNAL MEDICINE

## 2023-01-26 PROCEDURE — 258N000003 HC RX IP 258 OP 636: Performed by: INTERNAL MEDICINE

## 2023-01-26 PROCEDURE — 85004 AUTOMATED DIFF WBC COUNT: CPT | Performed by: INTERNAL MEDICINE

## 2023-01-26 PROCEDURE — C9113 INJ PANTOPRAZOLE SODIUM, VIA: HCPCS | Performed by: INTERNAL MEDICINE

## 2023-01-26 PROCEDURE — 92526 ORAL FUNCTION THERAPY: CPT | Mod: GN

## 2023-01-26 PROCEDURE — 80048 BASIC METABOLIC PNL TOTAL CA: CPT | Performed by: INTERNAL MEDICINE

## 2023-01-26 PROCEDURE — 99233 SBSQ HOSP IP/OBS HIGH 50: CPT | Performed by: INTERNAL MEDICINE

## 2023-01-26 PROCEDURE — 82803 BLOOD GASES ANY COMBINATION: CPT | Performed by: INTERNAL MEDICINE

## 2023-01-26 PROCEDURE — 250N000011 HC RX IP 250 OP 636: Performed by: INTERNAL MEDICINE

## 2023-01-26 PROCEDURE — XW033E5 INTRODUCTION OF REMDESIVIR ANTI-INFECTIVE INTO PERIPHERAL VEIN, PERCUTANEOUS APPROACH, NEW TECHNOLOGY GROUP 5: ICD-10-PCS | Performed by: HOSPITALIST

## 2023-01-26 RX ORDER — DEXTROSE MONOHYDRATE 25 G/50ML
25-50 INJECTION, SOLUTION INTRAVENOUS
Status: DISCONTINUED | OUTPATIENT
Start: 2023-01-26 | End: 2023-02-10 | Stop reason: HOSPADM

## 2023-01-26 RX ORDER — IPRATROPIUM BROMIDE AND ALBUTEROL SULFATE 2.5; .5 MG/3ML; MG/3ML
3 SOLUTION RESPIRATORY (INHALATION) EVERY 4 HOURS
Status: DISCONTINUED | OUTPATIENT
Start: 2023-01-26 | End: 2023-01-28

## 2023-01-26 RX ORDER — AMPICILLIN AND SULBACTAM 2; 1 G/1; G/1
3 INJECTION, POWDER, FOR SOLUTION INTRAMUSCULAR; INTRAVENOUS EVERY 6 HOURS
Status: DISCONTINUED | OUTPATIENT
Start: 2023-01-26 | End: 2023-01-27

## 2023-01-26 RX ORDER — NICOTINE POLACRILEX 4 MG
15-30 LOZENGE BUCCAL
Status: DISCONTINUED | OUTPATIENT
Start: 2023-01-26 | End: 2023-02-10 | Stop reason: HOSPADM

## 2023-01-26 RX ORDER — DEXAMETHASONE SODIUM PHOSPHATE 10 MG/ML
6 INJECTION, SOLUTION INTRAMUSCULAR; INTRAVENOUS EVERY 24 HOURS
Status: DISCONTINUED | OUTPATIENT
Start: 2023-01-26 | End: 2023-01-28

## 2023-01-26 RX ORDER — ENOXAPARIN SODIUM 100 MG/ML
40 INJECTION SUBCUTANEOUS EVERY 24 HOURS
Status: DISCONTINUED | OUTPATIENT
Start: 2023-01-26 | End: 2023-02-10 | Stop reason: HOSPADM

## 2023-01-26 RX ADMIN — GABAPENTIN 300 MG: 300 CAPSULE ORAL at 09:12

## 2023-01-26 RX ADMIN — REMDESIVIR 200 MG: 100 INJECTION, POWDER, LYOPHILIZED, FOR SOLUTION INTRAVENOUS at 17:59

## 2023-01-26 RX ADMIN — AMPICILLIN SODIUM AND SULBACTAM SODIUM 3 G: 2; 1 INJECTION, POWDER, FOR SOLUTION INTRAMUSCULAR; INTRAVENOUS at 09:29

## 2023-01-26 RX ADMIN — PANTOPRAZOLE SODIUM 40 MG: 40 INJECTION, POWDER, FOR SOLUTION INTRAVENOUS at 10:49

## 2023-01-26 RX ADMIN — IPRATROPIUM BROMIDE AND ALBUTEROL SULFATE 3 ML: 2.5; .5 SOLUTION RESPIRATORY (INHALATION) at 23:54

## 2023-01-26 RX ADMIN — IPRATROPIUM BROMIDE AND ALBUTEROL SULFATE 3 ML: 2.5; .5 SOLUTION RESPIRATORY (INHALATION) at 15:46

## 2023-01-26 RX ADMIN — ACETAMINOPHEN 650 MG: 325 TABLET, FILM COATED ORAL at 09:10

## 2023-01-26 RX ADMIN — TAZOBACTAM SODIUM AND PIPERACILLIN SODIUM 3.38 G: 375; 3 INJECTION, SOLUTION INTRAVENOUS at 03:13

## 2023-01-26 RX ADMIN — IPRATROPIUM BROMIDE AND ALBUTEROL SULFATE 3 ML: 2.5; .5 SOLUTION RESPIRATORY (INHALATION) at 20:23

## 2023-01-26 RX ADMIN — HYDROXYZINE HYDROCHLORIDE 10 MG: 10 TABLET ORAL at 09:14

## 2023-01-26 RX ADMIN — AMPICILLIN SODIUM AND SULBACTAM SODIUM 3 G: 2; 1 INJECTION, POWDER, FOR SOLUTION INTRAMUSCULAR; INTRAVENOUS at 21:26

## 2023-01-26 RX ADMIN — ENOXAPARIN SODIUM 40 MG: 40 INJECTION SUBCUTANEOUS at 12:06

## 2023-01-26 RX ADMIN — IPRATROPIUM BROMIDE AND ALBUTEROL SULFATE 3 ML: 2.5; .5 SOLUTION RESPIRATORY (INHALATION) at 10:10

## 2023-01-26 RX ADMIN — ISOSORBIDE DINITRATE 10 MG: 10 TABLET ORAL at 09:24

## 2023-01-26 RX ADMIN — AMPICILLIN SODIUM AND SULBACTAM SODIUM 3 G: 2; 1 INJECTION, POWDER, FOR SOLUTION INTRAMUSCULAR; INTRAVENOUS at 15:56

## 2023-01-26 RX ADMIN — SERTRALINE HYDROCHLORIDE 100 MG: 100 TABLET ORAL at 09:20

## 2023-01-26 RX ADMIN — Medication 250 MG: at 09:17

## 2023-01-26 RX ADMIN — LISINOPRIL 30 MG: 20 TABLET ORAL at 09:18

## 2023-01-26 RX ADMIN — DEXAMETHASONE SODIUM PHOSPHATE 6 MG: 10 INJECTION, SOLUTION INTRAMUSCULAR; INTRAVENOUS at 12:08

## 2023-01-26 RX ADMIN — CARVEDILOL 12.5 MG: 12.5 TABLET, FILM COATED ORAL at 09:26

## 2023-01-26 RX ADMIN — SODIUM CHLORIDE 50 ML: 9 INJECTION, SOLUTION INTRAVENOUS at 18:00

## 2023-01-26 RX ADMIN — IPRATROPIUM BROMIDE AND ALBUTEROL SULFATE 3 ML: 2.5; .5 SOLUTION RESPIRATORY (INHALATION) at 00:47

## 2023-01-26 RX ADMIN — MICONAZOLE NITRATE: 20 POWDER TOPICAL at 09:15

## 2023-01-26 ASSESSMENT — ACTIVITIES OF DAILY LIVING (ADL)
ADLS_ACUITY_SCORE: 53

## 2023-01-26 NOTE — PROGRESS NOTES
Patient has elevated temperature but is refusing to take any tylenol for me, will try back in a bit

## 2023-01-26 NOTE — PLAN OF CARE
Pertinent assessments: Pt Oriented to self only. Confused. VSS, on 2L NC during the day, 3L via oxymask at night. Ax2 with pooja steady. Lungs diminished.  Denied pain. Slept well.    Major Shift Events Uneventful    Treatment Plan: Speech, zosyn, await cultures    Bedside Nurse: Kim Vera RN

## 2023-01-26 NOTE — PROGRESS NOTES
End of Shift Summary  For vital signs and complete assessments, please see documentation flowsheets.     Pertinent assessments: Pt Oriented to self only. Confused. VSS, able to wean patient down to 1.5L n/c, breathing is labored w/ abdominal muscle use but pt denies SOB (relayed to MD), diminished/expiratory wheezes. Back pain, gave scheduled tylenol, effective. Ax2 with pooja steady, did try to get patient up to the chair for breakfast/lunch but patient refused.  Patient very lethargic, does wake up to voice but not able to stay awake for long at all, this was relayed to md, slept all shift.    Major Shift Events: Positive COVID 19, chest x-ray, blood gases    Treatment Plan: Speech, unasyn, BC, lovenox, decadron, PT/OT, pain management, wean oxygen, bs checks    Bedside Nurse: Marcy Jimenez RN

## 2023-01-26 NOTE — PLAN OF CARE
End of Shift Summary  For vital signs and complete assessments, please see documentation flowsheets.     Pertinent assessments: Oriented to self only. Confused with hallucinations this shift. 93-94% on RA, but dropping to 88-89%, expiratory wheezing.  Now 93% on 2L. Lungs diminished. Up to BSC with 2 assist to pivot. Sleeping between cares, bed alarm on for safety.     Major Shift Events Uneventful    Treatment Plan: Speech, zosyn, await cultures    Bedside Nurse: Sahra Mccall RN

## 2023-01-26 NOTE — PROGRESS NOTES
[facility]    Medicine Progress Note - Hospitalist Service    Date of Admission:  1/23/2023    Assessment & Plan      Daya Ignacio is a 89 year old female with known prior history of hypertension, prior C. difficile colitis, chronic pain on chronic narcotics, multiple compression fractures in the past, ESBL UTI, anxiety, depression, cognitive impairment and reportedly lives in an assisted living facility set up and unfortunately has to presents in the emergency room by emergency personnel earlier after she was found having jerking-like sensation and concerns for aspiration while partaking her dinner food tray.  During EMS evaluation she was found to be hypoxic at 61% via room air but were able to increase to close to 90% utilizing 15 L of oxygen in route here    Afternoon of 1/24/2023 she had worsening mental status, which culminated in vomiting.  Further work-up is being pursued.    Problem list:  Principal Problem:    Aspiration pneumonitis (H)  Possible aspiration pneumonia  Worsening leukocytosis  Urinary tract infection with isolated sensitive E. Coli  Prior history of ESBL urinary tract infection.   -Start ceftriaxone IV  -Await urine culture results  Active Problems:    Hypertension    Acute respiratory failure with hypoxia (H)  -Infectious encephalopathy    -Earlier had a fever spike   -Blood cultures remain no growth up-to-date   -Earlier leukocytosis with peak of 26 K as improved and back to normal   -However given increasing oxygen needs, wheezes we will ask for chest x-ray follow-up study   -COVID-19 screen also ordered   -May discontinue isolation precautions if COVID-negative   -Urine cultures grew sensitive E. Coli.earlier receiving Zosyn.  Will narrow antibiotics to Unasyn to also cover suspected aspiration pneumonia, pneumonitis process   -Procalcitonin was reassuring at low risk   -Earlier showing alteration in mental state.  Received 2 doses of 25 mg Seroquel  -Now appears  to be more sedated and lethargic.  Will stop Seroquel for now  -Appreciate input from SLP services.  Currently on modified assisted feeding diet.  -Restart antihypertensives, PRN hydralazine ordered    Addendum: 11:15 AM  COVID-19 screen showed positive results  -Likely this is a source of her new onset fever  -With her resolved leukocytosis and low risk procalcitonin unlikely earlier UTI and underlying recent aspiration is worsening  -Check CRP  -Continue oxygen support  -Start dexamethasone with hypoxia  -Monitor glucose levels  -Continue COVID-19 isolation precautions    Altered mental status, etiology unclear at this time.  Differential includes delirium, postictal state  -No obvious seizure-like activity  -Reassuring CT of the head  -Can be infectious encephalopathy    Chronic pain on chronic narcotics  -I reviewed her electronic medical records, home regimen and she is able to tolerate narcotics and opioids in the past.  -Endorsing no worsening pain issues.  I discontinued oral hydromorphone and IV hydromorphone and continued on her home regimen of hydrocodone given worsening mental status changes.    Will need social service evaluation as well for discharge planning disposition         Diet: Combination Diet Soft and Bite Sized Diet (level 6); Thin Liquids (level 0) (1:1 supervision for PO, ensure pt fully chewing/clearing oral cavity before next bite, fully upright, slow rate, liquid wash PRN.)    DVT Prophylaxis: Pneumatic Compression Devices  Almanza Catheter: Not present  Lines: None     Cardiac Monitoring: None  Code Status: No CPR- Do NOT Intubate      Clinically Significant Risk Factors                                Disposition Plan     Expected Discharge Date: Still needing inpatient care.  Anticipating at least another 2 days in the hospital.  Destination: assisted living if feasible          Family updated over the phone    Prakash Byrd MD, MD  Hospitalist Service  [facility]  Hospital  Securely message with [employer] (more info)  Text page via [facility] Paging/Directory   ______________________________________________________________________    Interval History   Continuing service care today.  Seen and examined.  Chart reviewed.  Case discussed with nursing service was gracious enough to present at bedside during my encounter.    Overnight no reported episodes of being agitated or combative.  However she appears to be more sleepy and lethargic this morning.  Currently using oxygen support between 2 to 3 L.  Notable wheezing.  Not much participatory during this morning encounter.  Earlier had a little fever spikes with T-max of 101.1  F.  No reported vomiting, diarrhea, bleeding tendencies or abdominal pain.      Physical Exam   Vital Signs: Temp: (!) 101.1  F (38.4  C) Temp src: Oral BP: (!) 147/56 Pulse: 87   Resp: 20 SpO2: 96 % O2 Device: Oxymask Oxygen Delivery: 4 LPM  Weight: 131 lbs 0 oz    HEENT; Atraumatic, normocephalic, pinkish conjuctiva, pupils bilateral reactive   Skin: warm and moist, no rashes  Lungs: Fair air entry, bilateral scattered obvious wheezes  Heart: normal rate, normal rhythm, no rubs or gallops.   Abdomen: normal bowel sounds, no tenderness, no peritoneal signs, no guarding  Extremities: no deformities, no edema   Neuro; Limited exam as patient is more lethargic, not following simple verbal instructions, able to talk in phrases coherent and spontaneous.    Psych; Sleepy, sedated not agitated        Medical Decision Making       50 MINUTES SPENT BY ME on the date of service doing chart review, history, exam, documentation & further activities per the note.  MANAGEMENT DISCUSSED with the following over the past 24 hours: yes   NOTE(S)/MEDICAL RECORDS REVIEWED over the past 24 hours: yes  Tests ORDERED & REVIEWED in the past 24 hours:      Data     I have personally reviewed the following data over the past 24 hrs:    10.9  \   7.6 (L)   / 157     141 108 (H) 25.0 (H) /   97   4.2 24 0.84 \       Procal: N/A CRP: N/A Lactic Acid: N/A         Imaging results reviewed over the past 24 hrs:   No results found for this or any previous visit (from the past 24 hour(s)).

## 2023-01-27 ENCOUNTER — APPOINTMENT (OUTPATIENT)
Dept: PHYSICAL THERAPY | Facility: CLINIC | Age: 88
DRG: 177 | End: 2023-01-27
Attending: FAMILY MEDICINE
Payer: COMMERCIAL

## 2023-01-27 LAB
ANION GAP SERPL CALCULATED.3IONS-SCNC: 10 MMOL/L (ref 7–15)
BASOPHILS # BLD AUTO: 0 10E3/UL (ref 0–0.2)
BASOPHILS NFR BLD AUTO: 0 %
BUN SERPL-MCNC: 23.4 MG/DL (ref 8–23)
CALCIUM SERPL-MCNC: 9.6 MG/DL (ref 8.2–9.6)
CHLORIDE SERPL-SCNC: 106 MMOL/L (ref 98–107)
CREAT SERPL-MCNC: 0.44 MG/DL (ref 0.51–0.95)
CRP SERPL-MCNC: 162.39 MG/L
DEPRECATED HCO3 PLAS-SCNC: 24 MMOL/L (ref 22–29)
EOSINOPHIL # BLD AUTO: 0 10E3/UL (ref 0–0.7)
EOSINOPHIL NFR BLD AUTO: 0 %
ERYTHROCYTE [DISTWIDTH] IN BLOOD BY AUTOMATED COUNT: 14.5 % (ref 10–15)
GFR SERPL CREATININE-BSD FRML MDRD: 90 ML/MIN/1.73M2
GLUCOSE BLDC GLUCOMTR-MCNC: 109 MG/DL (ref 70–99)
GLUCOSE BLDC GLUCOMTR-MCNC: 125 MG/DL (ref 70–99)
GLUCOSE BLDC GLUCOMTR-MCNC: 133 MG/DL (ref 70–99)
GLUCOSE BLDC GLUCOMTR-MCNC: 136 MG/DL (ref 70–99)
GLUCOSE BLDC GLUCOMTR-MCNC: 88 MG/DL (ref 70–99)
GLUCOSE SERPL-MCNC: 98 MG/DL (ref 70–99)
HCT VFR BLD AUTO: 25.1 % (ref 35–47)
HGB BLD-MCNC: 8.1 G/DL (ref 11.7–15.7)
IMM GRANULOCYTES # BLD: 0.2 10E3/UL
IMM GRANULOCYTES NFR BLD: 1 %
LYMPHOCYTES # BLD AUTO: 0.7 10E3/UL (ref 0.8–5.3)
LYMPHOCYTES NFR BLD AUTO: 4 %
MCH RBC QN AUTO: 32.4 PG (ref 26.5–33)
MCHC RBC AUTO-ENTMCNC: 32.3 G/DL (ref 31.5–36.5)
MCV RBC AUTO: 100 FL (ref 78–100)
MONOCYTES # BLD AUTO: 0.8 10E3/UL (ref 0–1.3)
MONOCYTES NFR BLD AUTO: 5 %
NEUTROPHILS # BLD AUTO: 13.3 10E3/UL (ref 1.6–8.3)
NEUTROPHILS NFR BLD AUTO: 90 %
NRBC # BLD AUTO: 0 10E3/UL
NRBC BLD AUTO-RTO: 0 /100
PLATELET # BLD AUTO: 188 10E3/UL (ref 150–450)
POTASSIUM SERPL-SCNC: 3.4 MMOL/L (ref 3.4–5.3)
RBC # BLD AUTO: 2.5 10E6/UL (ref 3.8–5.2)
SODIUM SERPL-SCNC: 140 MMOL/L (ref 136–145)
WBC # BLD AUTO: 14.8 10E3/UL (ref 4–11)

## 2023-01-27 PROCEDURE — 97530 THERAPEUTIC ACTIVITIES: CPT | Mod: GP | Performed by: PHYSICAL THERAPIST

## 2023-01-27 PROCEDURE — 999N000157 HC STATISTIC RCP TIME EA 10 MIN

## 2023-01-27 PROCEDURE — 97161 PT EVAL LOW COMPLEX 20 MIN: CPT | Mod: GP | Performed by: PHYSICAL THERAPIST

## 2023-01-27 PROCEDURE — 258N000003 HC RX IP 258 OP 636: Performed by: INTERNAL MEDICINE

## 2023-01-27 PROCEDURE — 94640 AIRWAY INHALATION TREATMENT: CPT | Mod: 76

## 2023-01-27 PROCEDURE — 36415 COLL VENOUS BLD VENIPUNCTURE: CPT | Performed by: INTERNAL MEDICINE

## 2023-01-27 PROCEDURE — 250N000011 HC RX IP 250 OP 636: Performed by: INTERNAL MEDICINE

## 2023-01-27 PROCEDURE — 85025 COMPLETE CBC W/AUTO DIFF WBC: CPT | Performed by: INTERNAL MEDICINE

## 2023-01-27 PROCEDURE — 250N000009 HC RX 250: Performed by: INTERNAL MEDICINE

## 2023-01-27 PROCEDURE — 86140 C-REACTIVE PROTEIN: CPT | Performed by: INTERNAL MEDICINE

## 2023-01-27 PROCEDURE — C9113 INJ PANTOPRAZOLE SODIUM, VIA: HCPCS | Performed by: INTERNAL MEDICINE

## 2023-01-27 PROCEDURE — 94640 AIRWAY INHALATION TREATMENT: CPT

## 2023-01-27 PROCEDURE — 99233 SBSQ HOSP IP/OBS HIGH 50: CPT | Performed by: INTERNAL MEDICINE

## 2023-01-27 PROCEDURE — 250N000013 HC RX MED GY IP 250 OP 250 PS 637: Performed by: INTERNAL MEDICINE

## 2023-01-27 PROCEDURE — 120N000001 HC R&B MED SURG/OB

## 2023-01-27 PROCEDURE — 250N000013 HC RX MED GY IP 250 OP 250 PS 637: Performed by: FAMILY MEDICINE

## 2023-01-27 PROCEDURE — 80048 BASIC METABOLIC PNL TOTAL CA: CPT | Performed by: INTERNAL MEDICINE

## 2023-01-27 RX ORDER — AMLODIPINE BESYLATE 10 MG/1
10 TABLET ORAL DAILY
Status: DISCONTINUED | OUTPATIENT
Start: 2023-01-27 | End: 2023-02-10 | Stop reason: HOSPADM

## 2023-01-27 RX ADMIN — LISINOPRIL 30 MG: 20 TABLET ORAL at 10:14

## 2023-01-27 RX ADMIN — IPRATROPIUM BROMIDE AND ALBUTEROL SULFATE 3 ML: 2.5; .5 SOLUTION RESPIRATORY (INHALATION) at 19:34

## 2023-01-27 RX ADMIN — ENOXAPARIN SODIUM 40 MG: 40 INJECTION SUBCUTANEOUS at 11:49

## 2023-01-27 RX ADMIN — IPRATROPIUM BROMIDE AND ALBUTEROL SULFATE 3 ML: 2.5; .5 SOLUTION RESPIRATORY (INHALATION) at 11:26

## 2023-01-27 RX ADMIN — ACETAMINOPHEN 650 MG: 325 TABLET, FILM COATED ORAL at 22:09

## 2023-01-27 RX ADMIN — AMOXICILLIN AND CLAVULANATE POTASSIUM 1 TABLET: 875; 125 TABLET, FILM COATED ORAL at 10:14

## 2023-01-27 RX ADMIN — HYDROXYZINE HYDROCHLORIDE 10 MG: 10 TABLET ORAL at 20:16

## 2023-01-27 RX ADMIN — IPRATROPIUM BROMIDE AND ALBUTEROL SULFATE 3 ML: 2.5; .5 SOLUTION RESPIRATORY (INHALATION) at 08:46

## 2023-01-27 RX ADMIN — SENNOSIDES AND DOCUSATE SODIUM 1 TABLET: 50; 8.6 TABLET ORAL at 22:10

## 2023-01-27 RX ADMIN — MICONAZOLE NITRATE: 20 POWDER TOPICAL at 20:16

## 2023-01-27 RX ADMIN — ISOSORBIDE DINITRATE 10 MG: 10 TABLET ORAL at 22:10

## 2023-01-27 RX ADMIN — GABAPENTIN 300 MG: 300 CAPSULE ORAL at 22:10

## 2023-01-27 RX ADMIN — REMDESIVIR 100 MG: 100 INJECTION, POWDER, LYOPHILIZED, FOR SOLUTION INTRAVENOUS at 16:53

## 2023-01-27 RX ADMIN — ACETAMINOPHEN 650 MG: 325 TABLET, FILM COATED ORAL at 16:59

## 2023-01-27 RX ADMIN — HYDROXYZINE HYDROCHLORIDE 10 MG: 10 TABLET ORAL at 10:15

## 2023-01-27 RX ADMIN — SERTRALINE HYDROCHLORIDE 100 MG: 100 TABLET ORAL at 10:14

## 2023-01-27 RX ADMIN — ISOSORBIDE DINITRATE 10 MG: 10 TABLET ORAL at 10:14

## 2023-01-27 RX ADMIN — IPRATROPIUM BROMIDE AND ALBUTEROL SULFATE 3 ML: 2.5; .5 SOLUTION RESPIRATORY (INHALATION) at 15:42

## 2023-01-27 RX ADMIN — MICONAZOLE NITRATE: 20 POWDER TOPICAL at 10:17

## 2023-01-27 RX ADMIN — DEXAMETHASONE SODIUM PHOSPHATE 6 MG: 10 INJECTION, SOLUTION INTRAMUSCULAR; INTRAVENOUS at 11:49

## 2023-01-27 RX ADMIN — SODIUM CHLORIDE 50 ML: 9 INJECTION, SOLUTION INTRAVENOUS at 16:55

## 2023-01-27 RX ADMIN — GABAPENTIN 300 MG: 300 CAPSULE ORAL at 16:59

## 2023-01-27 RX ADMIN — CLONIDINE HYDROCHLORIDE 0.1 MG: 0.1 TABLET ORAL at 22:09

## 2023-01-27 RX ADMIN — CARVEDILOL 25 MG: 25 TABLET, FILM COATED ORAL at 22:10

## 2023-01-27 RX ADMIN — GABAPENTIN 300 MG: 300 CAPSULE ORAL at 10:15

## 2023-01-27 RX ADMIN — PANTOPRAZOLE SODIUM 40 MG: 40 INJECTION, POWDER, FOR SOLUTION INTRAVENOUS at 10:17

## 2023-01-27 RX ADMIN — AMPICILLIN SODIUM AND SULBACTAM SODIUM 3 G: 2; 1 INJECTION, POWDER, FOR SOLUTION INTRAMUSCULAR; INTRAVENOUS at 04:07

## 2023-01-27 RX ADMIN — IPRATROPIUM BROMIDE AND ALBUTEROL SULFATE 3 ML: 2.5; .5 SOLUTION RESPIRATORY (INHALATION) at 03:40

## 2023-01-27 RX ADMIN — Medication 250 MG: at 10:14

## 2023-01-27 RX ADMIN — AMLODIPINE BESYLATE 10 MG: 10 TABLET ORAL at 10:15

## 2023-01-27 RX ADMIN — CARVEDILOL 12.5 MG: 12.5 TABLET, FILM COATED ORAL at 10:15

## 2023-01-27 RX ADMIN — AMOXICILLIN AND CLAVULANATE POTASSIUM 1 TABLET: 875; 125 TABLET, FILM COATED ORAL at 20:15

## 2023-01-27 RX ADMIN — ISOSORBIDE DINITRATE 10 MG: 10 TABLET ORAL at 16:59

## 2023-01-27 RX ADMIN — ACETAMINOPHEN 650 MG: 325 TABLET, FILM COATED ORAL at 10:14

## 2023-01-27 ASSESSMENT — ACTIVITIES OF DAILY LIVING (ADL)
ADLS_ACUITY_SCORE: 49
ADLS_ACUITY_SCORE: 53
ADLS_ACUITY_SCORE: 53
ADLS_ACUITY_SCORE: 49
ADLS_ACUITY_SCORE: 53
ADLS_ACUITY_SCORE: 49
ADLS_ACUITY_SCORE: 53
ADLS_ACUITY_SCORE: 49
ADLS_ACUITY_SCORE: 53

## 2023-01-27 NOTE — PLAN OF CARE
To Do:  End of Shift Summary  For vital signs and complete assessments, please see documentation flowsheets.     Pertinent assessments: Special precautions, Contact precautions. HEPA filter in room. Seizure precautions. VSS. On 1.5 L NC, 95%. Confused, uncooperative at times. Refused many nursing assessments and interventions tonight, despite education. Pulled out IV, replaced. Denies pain. Infrequent cough. Frequent oral swabs. Drank >200ml juice tonight. Purewick in place, 250 ml output. Ax2 with lift. Changed x1, 1 BM. Refused repositioning many times. Pt did not sleep well.   Major Shift Events: None.   Treatment Plan: Monitor pain, O2sats, cough, VS. Continue ABX. Discharge TBD.    Bedside Nurse: Florecita Blanchard RN

## 2023-01-27 NOTE — PROGRESS NOTES
[facility]    Medicine Progress Note - Hospitalist Service    Date of Admission:  1/23/2023    Assessment & Plan      Daya Ignacio is a 89 year old female with known prior history of hypertension, prior C. difficile colitis, chronic pain on chronic narcotics, multiple compression fractures in the past, ESBL UTI, anxiety, depression, cognitive impairment and reportedly lives in an assisted living facility set up and unfortunately has to presents in the emergency room by emergency personnel earlier after she was found having jerking-like sensation and concerns for aspiration while partaking her dinner food tray.  During EMS evaluation she was found to be hypoxic at 61% via room air but were able to increase to close to 90% utilizing 15 L of oxygen in route here    Afternoon of 1/24/2023 she had worsening mental status, which culminated in vomiting.  Further work-up is being pursued.    Problem list:  Principal Problem:    Aspiration pneumonitis (H)  Possible aspiration pneumonia  Worsening leukocytosis  Urinary tract infection with isolated sensitive E. Coli  Prior history of ESBL urinary tract infection.   Acute hypoxic respiratory failure likely combination of possible aspiration pneumonia now with COVID-19 infection and pneumonia    Active Problems:    Hypertension    Acute respiratory failure with hypoxia (H)  -Infectious encephalopathy    -No further fever spike   -Subsequent procalcitonin low risk   -COVID-19 screen and influenza screen pursued due to fever spike last 1/26/2023   -Acute COVID-19 infection with possible underlying COVID-19 pneumonia with hypoxia   -Started on remdesivir and Decadron last 1/26/2023   -Elevated CRP and will be continued to be monitored   -Breathing treatments for her wheezing, improving, oxygen support decreasing currently on 1 L   -Earlier leukocytosis has resolved but now on the rising trend can also be attributed to underlying steroid use   -COVID-19  "isolation precautions   -Stop IV antibiotics and changed to oral Augmentin as patient has no ESBL E. coli.  Isolated E. coli from urine cultures grew sensitive to oral antibiotics   -This will also cover if she has a component of aspiration pneumonia   -Blood cultures remain no growth up-to-date  -Now with increasing blood pressure levels, will resume all of her oral home regimen antihypertensive  -Earlier showing alteration in mental state.  Received 2 doses of 25 mg Seroquel  -Seroquel discontinued.  Continue to hold any further anxiolytics    Altered mental status, etiology unclear at this time.  Differential includes delirium, postictal state  -No obvious seizure-like activity  -Reassuring CT of the head  -Can be infectious encephalopathy    Chronic pain on chronic narcotics  -I reviewed her electronic medical records, home regimen and she is able to tolerate narcotics and opioids in the past.  -Endorsing no worsening pain issues.  I discontinued oral hydromorphone and IV hydromorphone and continued on her home regimen of hydrocodone given worsening mental status changes.    Will need social service evaluation as well for discharge planning disposition         Diet: Combination Diet Soft and Bite Sized Diet (level 6); Thin Liquids (level 0) (1:1 supervision for PO, ensure pt fully chewing/clearing oral cavity before next bite, fully upright, slow rate, liquid wash PRN.)    DVT Prophylaxis: Pneumatic Compression Devices  Almanza Catheter: Not present  Lines: None     Cardiac Monitoring: None  Code Status: No CPR- Do NOT Intubate      Clinically Significant Risk Factors                        # Overweight: Estimated body mass index is 25.07 kg/m  as calculated from the following:    Height as of this encounter: 1.549 m (5' 1\").    Weight as of this encounter: 60.2 kg (132 lb 11.2 oz)., PRESENT ON ADMISSION         Disposition Plan     Expected Discharge Date: Still needing inpatient care.  Anticipating at least " another 2 days in the hospital.  Destination: assisted living if feasible          Family updated over the phone    Prakash Byrd MD, MD  Hospitalist Service  [facility]  Securely message with [employer] (more info)  Text page via [employer] Paging/Directory   ______________________________________________________________________    Interval History   Continuing service care today.  Seen and examined.  Chart reviewed.  Case discussed with nursing service.  Family updated over the phone  I met Daya this morning while she is laying comfortably in bed.  She appears to be clinically improving better.  She is more interactive, following some simple verbal instructions but still very hard of hearing.  Currently afebrile on minimal oxygen support.  No reported nausea, vomiting.  No reported diarrhea either.  No bleeding tendencies.  Earlier wheezing appears to be significantly improved.       Physical Exam   Vital Signs: Temp: 97.7  F (36.5  C) Temp src: Axillary BP: (!) 173/60 Pulse: 93   Resp: 20 SpO2: 97 % O2 Device: Nasal cannula Oxygen Delivery: 1 LPM  Weight: 132 lbs 11.2 oz    HEENT; Atraumatic, normocephalic, pinkish conjuctiva, pupils bilateral reactive   Skin: warm and moist, no rashes  Lungs: Fair air entry, earlier wheezing has improved  Heart: normal rate, normal rhythm, no rubs or gallops.   Abdomen: normal bowel sounds, no tenderness, no peritoneal signs, no guarding  Extremities: no deformities, no edema   Neuro; she is awake, alert, oriented to her name, following some simple verbal instructions, extremely hard of hearing, moves all extremities spontaneously  Psych; she is not combative nor agitated        Medical Decision Making       50 MINUTES SPENT BY ME on the date of service doing chart review, history, exam, documentation & further activities per the note.  MANAGEMENT DISCUSSED with the following over the past 24 hours: yes   NOTE(S)/MEDICAL RECORDS REVIEWED over the past 24  hours: yes  Tests ORDERED & REVIEWED in the past 24 hours:      Data     I have personally reviewed the following data over the past 24 hrs:    14.8 (H)  \   8.1 (L)   / 188     140 106 23.4 (H) /  98   3.4 24 0.44 (L) \       Procal: N/A CRP: 162.39 (H) Lactic Acid: N/A         Imaging results reviewed over the past 24 hrs:   Recent Results (from the past 24 hour(s))   XR Chest Port 1 View    Narrative    XR CHEST PORT 1 VIEW 1/26/2023 10:20 AM    HISTORY: Fever, hypoxia, wheezing    COMPARISON: 1/23/2023      Impression    IMPRESSION: Stable mild bibasilar retrocardiac atelectasis/pneumonia.  No pleural effusion or pneumothorax. Normal heart size.    HATTIE DEVINE MD         SYSTEM ID:  [id number]

## 2023-01-27 NOTE — PLAN OF CARE
Pertinent assessments: VSS on 1.5L NC. Afebrile. Awakens to touch. Unable to fully assess orientation. Appears oriented to self only. Dyspneic on exertion. LS are diminished. No cough. Somnolent this entire evening. Does not appear to be in any acute distress. Bed alarm on for safety. Up with assist of 2, and pooja steady or lift. Special and contact precautions remain in place.    Major Shift Events: Pt refused any PO intake.    Treatment Plan: IV remdesivir, IV Unasyn, special precautions, wean O2, therapies, and discharge pending.

## 2023-01-27 NOTE — PROGRESS NOTES
01/27/23 1350   Appointment Info   Signing Clinician's Name / Credentials (PT) Cheri Santoro, JASON   Living Environment   People in Home facility resident   Current Living Arrangements assisted living   Home Accessibility no concerns   Self-Care   Usual Activity Tolerance moderate   Current Activity Tolerance fair   Equipment Currently Used at Home walker, rolling  (per patient; chart indicates wheelchair)   Fall history within last six months   (patient denies any falls)   Activity/Exercise/Self-Care Comment patient [place]; difficulty with giving PLOF   General Information   Onset of Illness/Injury or Date of Surgery 01/23/23   Referring Physician Nirav Mcpherson MD   Patient/Family Therapy Goals Statement (PT) not stated   Pertinent History of Current Problem (include personal factors and/or comorbidities that impact the POC) per chart: Daya Ignacio is a 89 year old female with known prior history of hypertension, prior C. difficile colitis, chronic pain on chronic narcotics, multiple compression fractures in the past, ESBL UTI, anxiety, depression, cognitive impairment and reportedly lives in an assisted living facility set up and unfortunately has to presents in the emergency room by emergency personnel earlier after she was found having jerking-like sensation and concerns for aspiration while partaking her dinner food tray.  During EMS evaluation she was found to be hypoxic at 61% via room air but were able to increase to close to 90% utilizing 15 L of oxygen in route here; found to be Covid (+); possible aspiration pneumonia; UTI; ARF with hypoxia; infectious encephalopathy; see medical record for further information   Existing Precautions/Restrictions fall;seizures;oxygen therapy device and L/min   General Observations patient in bed; agreeable to session; OK per RN   Cognition   Orientation Status (Cognition) oriented x 3   Follows Commands (Cognition) 75-90% accuracy  ([place])   Cognitive Status  Comments pleasant and cooperative   Pain Assessment   Patient Currently in Pain No   Posture    Posture Comments forward flexed at head and trunk   Range of Motion (ROM)   ROM Comment WFL   Strength (Manual Muscle Testing)   Strength Comments functional weakness noted from current illness; currently A x 2 with Tiff Steady   Bed Mobility   Comment, (Bed Mobility) Min A and use of bedrail   Transfers   Comment, (Transfers) mod/max A x 2 and Tiff Steady to stand   Gait/Stairs (Locomotion)   Comment, (Gait/Stairs) currently unable to tolerate   Balance   Balance Comments no gross LOB in sitting   Clinical Impression   Criteria for Skilled Therapeutic Intervention Yes, treatment indicated   PT Diagnosis (PT) impaired functional mobility   Influenced by the following impairments weakness; O2 needs; [place]ol[place]; decreased activity tolerance; impaired balance   Functional limitations due to impairments impaired independence with mobility and cares secondary to above deficits   Clinical Presentation (PT Evaluation Complexity) Evolving/Changing   Clinical Presentation Rationale clinical judgement; level of assist   Clinical Decision Making (Complexity) low complexity   Planned Therapy Interventions (PT) bed mobility training;gait training;strengthening;transfer training;progressive activity/exercise   Anticipated Equipment Needs at Discharge (PT) walker, rolling;wheelchair   Risk & Benefits of therapy have been explained evaluation/treatment results reviewed;care plan/treatment goals reviewed;risks/benefits reviewed;current/potential barriers reviewed;participants voiced agreement with care plan;participants included;patient   PT Total Evaluation Time   PT Eval, Low Complexity Minutes (72571) 10   Physical Therapy Goals   PT Frequency 5x/week   PT Predicted Duration/Target Date for Goal Attainment 02/03/23   PT Discharge Planning   PT Discharge Recommendation (DC Rec) Transitional Care Facility   PT Rationale for DC Rec Patient below  baseline and will need ongoing rehab at TCU to maximize return to PLOF; not safe to return directly back to [facility] at this time   PT Brief overview of current status A x 2 with Tiff Lemus

## 2023-01-28 ENCOUNTER — APPOINTMENT (OUTPATIENT)
Dept: SPEECH THERAPY | Facility: CLINIC | Age: 88
DRG: 177 | End: 2023-01-28
Payer: COMMERCIAL

## 2023-01-28 LAB
ANION GAP SERPL CALCULATED.3IONS-SCNC: 11 MMOL/L (ref 7–15)
BASOPHILS # BLD AUTO: 0 10E3/UL (ref 0–0.2)
BASOPHILS NFR BLD AUTO: 0 %
BUN SERPL-MCNC: 18.5 MG/DL (ref 8–23)
CALCIUM SERPL-MCNC: 9 MG/DL (ref 8.2–9.6)
CHLORIDE SERPL-SCNC: 105 MMOL/L (ref 98–107)
CREAT SERPL-MCNC: 0.38 MG/DL (ref 0.51–0.95)
CRP SERPL-MCNC: 83.95 MG/L
DEPRECATED HCO3 PLAS-SCNC: 23 MMOL/L (ref 22–29)
EOSINOPHIL # BLD AUTO: 0 10E3/UL (ref 0–0.7)
EOSINOPHIL NFR BLD AUTO: 0 %
ERYTHROCYTE [DISTWIDTH] IN BLOOD BY AUTOMATED COUNT: 14.7 % (ref 10–15)
GFR SERPL CREATININE-BSD FRML MDRD: >90 ML/MIN/1.73M2
GLUCOSE BLDC GLUCOMTR-MCNC: 111 MG/DL (ref 70–99)
GLUCOSE BLDC GLUCOMTR-MCNC: 113 MG/DL (ref 70–99)
GLUCOSE BLDC GLUCOMTR-MCNC: 138 MG/DL (ref 70–99)
GLUCOSE BLDC GLUCOMTR-MCNC: 94 MG/DL (ref 70–99)
GLUCOSE SERPL-MCNC: 96 MG/DL (ref 70–99)
HCT VFR BLD AUTO: 23.9 % (ref 35–47)
HGB BLD-MCNC: 7.6 G/DL (ref 11.7–15.7)
IMM GRANULOCYTES # BLD: 0.1 10E3/UL
IMM GRANULOCYTES NFR BLD: 1 %
LYMPHOCYTES # BLD AUTO: 0.7 10E3/UL (ref 0.8–5.3)
LYMPHOCYTES NFR BLD AUTO: 13 %
MCH RBC QN AUTO: 32.3 PG (ref 26.5–33)
MCHC RBC AUTO-ENTMCNC: 31.8 G/DL (ref 31.5–36.5)
MCV RBC AUTO: 102 FL (ref 78–100)
MONOCYTES # BLD AUTO: 0.4 10E3/UL (ref 0–1.3)
MONOCYTES NFR BLD AUTO: 8 %
NEUTROPHILS # BLD AUTO: 4.3 10E3/UL (ref 1.6–8.3)
NEUTROPHILS NFR BLD AUTO: 78 %
NRBC # BLD AUTO: 0 10E3/UL
NRBC BLD AUTO-RTO: 0 /100
PLATELET # BLD AUTO: 169 10E3/UL (ref 150–450)
POTASSIUM SERPL-SCNC: 3.4 MMOL/L (ref 3.4–5.3)
RBC # BLD AUTO: 2.35 10E6/UL (ref 3.8–5.2)
SODIUM SERPL-SCNC: 139 MMOL/L (ref 136–145)
WBC # BLD AUTO: 5.5 10E3/UL (ref 4–11)

## 2023-01-28 PROCEDURE — 250N000011 HC RX IP 250 OP 636: Performed by: INTERNAL MEDICINE

## 2023-01-28 PROCEDURE — 120N000001 HC R&B MED SURG/OB

## 2023-01-28 PROCEDURE — 85025 COMPLETE CBC W/AUTO DIFF WBC: CPT | Performed by: INTERNAL MEDICINE

## 2023-01-28 PROCEDURE — 999N000157 HC STATISTIC RCP TIME EA 10 MIN

## 2023-01-28 PROCEDURE — 250N000013 HC RX MED GY IP 250 OP 250 PS 637: Performed by: FAMILY MEDICINE

## 2023-01-28 PROCEDURE — 250N000009 HC RX 250: Performed by: INTERNAL MEDICINE

## 2023-01-28 PROCEDURE — 258N000003 HC RX IP 258 OP 636: Performed by: INTERNAL MEDICINE

## 2023-01-28 PROCEDURE — 92526 ORAL FUNCTION THERAPY: CPT | Mod: GN

## 2023-01-28 PROCEDURE — 36415 COLL VENOUS BLD VENIPUNCTURE: CPT | Performed by: INTERNAL MEDICINE

## 2023-01-28 PROCEDURE — 80048 BASIC METABOLIC PNL TOTAL CA: CPT | Performed by: INTERNAL MEDICINE

## 2023-01-28 PROCEDURE — C9113 INJ PANTOPRAZOLE SODIUM, VIA: HCPCS | Performed by: INTERNAL MEDICINE

## 2023-01-28 PROCEDURE — 250N000013 HC RX MED GY IP 250 OP 250 PS 637: Performed by: INTERNAL MEDICINE

## 2023-01-28 PROCEDURE — 99233 SBSQ HOSP IP/OBS HIGH 50: CPT | Performed by: INTERNAL MEDICINE

## 2023-01-28 PROCEDURE — 94640 AIRWAY INHALATION TREATMENT: CPT

## 2023-01-28 PROCEDURE — 86140 C-REACTIVE PROTEIN: CPT | Performed by: INTERNAL MEDICINE

## 2023-01-28 RX ORDER — IPRATROPIUM BROMIDE AND ALBUTEROL SULFATE 2.5; .5 MG/3ML; MG/3ML
3 SOLUTION RESPIRATORY (INHALATION) EVERY 4 HOURS PRN
Status: DISCONTINUED | OUTPATIENT
Start: 2023-01-28 | End: 2023-02-10 | Stop reason: HOSPADM

## 2023-01-28 RX ADMIN — GABAPENTIN 300 MG: 300 CAPSULE ORAL at 09:09

## 2023-01-28 RX ADMIN — Medication 250 MG: at 09:09

## 2023-01-28 RX ADMIN — REMDESIVIR 100 MG: 100 INJECTION, POWDER, LYOPHILIZED, FOR SOLUTION INTRAVENOUS at 16:13

## 2023-01-28 RX ADMIN — CARVEDILOL 25 MG: 25 TABLET, FILM COATED ORAL at 21:15

## 2023-01-28 RX ADMIN — ISOSORBIDE DINITRATE 10 MG: 10 TABLET ORAL at 21:15

## 2023-01-28 RX ADMIN — ACETAMINOPHEN 650 MG: 325 TABLET, FILM COATED ORAL at 16:18

## 2023-01-28 RX ADMIN — GABAPENTIN 300 MG: 300 CAPSULE ORAL at 21:15

## 2023-01-28 RX ADMIN — ONDANSETRON 4 MG: 4 TABLET, ORALLY DISINTEGRATING ORAL at 09:56

## 2023-01-28 RX ADMIN — ACETAMINOPHEN 650 MG: 325 TABLET, FILM COATED ORAL at 09:09

## 2023-01-28 RX ADMIN — ISOSORBIDE DINITRATE 10 MG: 10 TABLET ORAL at 09:09

## 2023-01-28 RX ADMIN — ISOSORBIDE DINITRATE 10 MG: 10 TABLET ORAL at 16:18

## 2023-01-28 RX ADMIN — PANTOPRAZOLE SODIUM 40 MG: 40 INJECTION, POWDER, FOR SOLUTION INTRAVENOUS at 09:08

## 2023-01-28 RX ADMIN — SERTRALINE HYDROCHLORIDE 100 MG: 100 TABLET ORAL at 09:09

## 2023-01-28 RX ADMIN — HYDROXYZINE HYDROCHLORIDE 10 MG: 10 TABLET ORAL at 21:15

## 2023-01-28 RX ADMIN — GABAPENTIN 300 MG: 300 CAPSULE ORAL at 16:18

## 2023-01-28 RX ADMIN — AMOXICILLIN AND CLAVULANATE POTASSIUM 1 TABLET: 875; 125 TABLET, FILM COATED ORAL at 21:15

## 2023-01-28 RX ADMIN — LISINOPRIL 30 MG: 20 TABLET ORAL at 09:09

## 2023-01-28 RX ADMIN — AMOXICILLIN AND CLAVULANATE POTASSIUM 1 TABLET: 875; 125 TABLET, FILM COATED ORAL at 09:09

## 2023-01-28 RX ADMIN — DEXAMETHASONE 6 MG: 2 TABLET ORAL at 09:56

## 2023-01-28 RX ADMIN — ACETAMINOPHEN 650 MG: 325 TABLET, FILM COATED ORAL at 21:15

## 2023-01-28 RX ADMIN — MICONAZOLE NITRATE: 20 POWDER TOPICAL at 21:16

## 2023-01-28 RX ADMIN — CLONIDINE HYDROCHLORIDE 0.1 MG: 0.1 TABLET ORAL at 21:15

## 2023-01-28 RX ADMIN — MICONAZOLE NITRATE: 20 POWDER TOPICAL at 09:08

## 2023-01-28 RX ADMIN — ENOXAPARIN SODIUM 40 MG: 40 INJECTION SUBCUTANEOUS at 14:05

## 2023-01-28 RX ADMIN — HYDROXYZINE HYDROCHLORIDE 10 MG: 10 TABLET ORAL at 09:09

## 2023-01-28 RX ADMIN — CARVEDILOL 12.5 MG: 12.5 TABLET, FILM COATED ORAL at 09:09

## 2023-01-28 RX ADMIN — SODIUM CHLORIDE 50 ML: 9 INJECTION, SOLUTION INTRAVENOUS at 16:14

## 2023-01-28 RX ADMIN — AMLODIPINE BESYLATE 10 MG: 10 TABLET ORAL at 09:09

## 2023-01-28 RX ADMIN — SENNOSIDES AND DOCUSATE SODIUM 1 TABLET: 50; 8.6 TABLET ORAL at 21:14

## 2023-01-28 RX ADMIN — IPRATROPIUM BROMIDE AND ALBUTEROL SULFATE 3 ML: 2.5; .5 SOLUTION RESPIRATORY (INHALATION) at 03:59

## 2023-01-28 ASSESSMENT — ACTIVITIES OF DAILY LIVING (ADL)
ADLS_ACUITY_SCORE: 56
ADLS_ACUITY_SCORE: 55
ADLS_ACUITY_SCORE: 55
ADLS_ACUITY_SCORE: 56
ADLS_ACUITY_SCORE: 49
ADLS_ACUITY_SCORE: 49
ADLS_ACUITY_SCORE: 56
ADLS_ACUITY_SCORE: 49
ADLS_ACUITY_SCORE: 56
ADLS_ACUITY_SCORE: 55
ADLS_ACUITY_SCORE: 55
DEPENDENT_IADLS:: CLEANING;COOKING;LAUNDRY;MEAL PREPARATION;MEDICATION MANAGEMENT;TRANSPORTATION
ADLS_ACUITY_SCORE: 56

## 2023-01-28 NOTE — PLAN OF CARE
Goal Outcome Evaluation:    End of Shift Summary  For vital signs and complete assessments, please see documentation flowsheets.     Pertinent assessments:   Alert, oriented to self only. Pleasant, agreeable to cares. Denies pain, nausea. On 1-2L NC to keep sats >92%. Dry cough. Purewick in place, good output. One large BM this am. Tolerating diet, but poor appetite.    Major Shift Events: switched to oral abx.  Treatment Plan: Monitor pain, respiratory status. Lovenox, nebs, remdesivir, decadron, augmentin. Discharge TBD.

## 2023-01-28 NOTE — PROGRESS NOTES
[facility]    Medicine Progress Note - Hospitalist Service    Date of Admission:  1/23/2023    Assessment & Plan      Daya Ignacio is a 89 year old female with known prior history of hypertension, prior C. difficile colitis, chronic pain on chronic narcotics, multiple compression fractures in the past, ESBL UTI, anxiety, depression, cognitive impairment and reportedly lives in an assisted living facility set up and unfortunately has to presents in the emergency room by emergency personnel earlier after she was found having jerking-like sensation and concerns for aspiration while partaking her dinner food tray.  During EMS evaluation she was found to be hypoxic at 61% via room air but were able to increase to close to 90% utilizing 15 L of oxygen in route here    Afternoon of 1/24/2023 she had worsening mental status, which culminated in vomiting.  Further work-up is being pursued.    Problem list:  Principal Problem:    Aspiration pneumonitis (H)  Possible aspiration pneumonia  Worsening leukocytosis  Urinary tract infection with isolated sensitive E. Coli  Prior history of ESBL urinary tract infection.   Acute hypoxic respiratory failure likely combination of possible aspiration pneumonia now with COVID-19 infection and pneumonia    Active Problems:    Hypertension    Acute respiratory failure with hypoxia (H)  -Infectious encephalopathy-improving    -No further fever spike   -Subsequent procalcitonin low risk   -COVID-19 screen and influenza screen pursued due to fever spike last 1/26/2023   -Acute COVID-19 infection with possible underlying COVID-19 pneumonia with hypoxia   -Started on remdesivir and Decadron last 1/26/2023   -Elevated CRP and will be continued to be monitored   -CRP on a downward trend  -Leukocytosis resolved  -Continue to wean and titrate oxygen as able currently at 1 L by nasal cannula  -Change breathing treatment to as needed basis  -Finish oral antibiotics course for  "isolated sensitive E. coli with UTI.  Will also cover for possibility of aspiration pneumonitis and pneumonia earlier    -COVID-19 isolation precautions    -Blood cultures remain no growth up-to-date  -Now with increasing blood pressure levels, will resume all of her oral home regimen antihypertensive  -Earlier showing alteration in mental state.  Received 2 doses of 25 mg Seroquel  -Seroquel discontinued as patient became very sedated and lethargic.  Continue to hold any further anxiolytics    Altered mental status, etiology unclear at this time.  Differential includes delirium, postictal state  -No obvious seizure-like activity  -Reassuring CT of the head  -Can be infectious encephalopathy    Chronic pain on chronic narcotics  -I reviewed her electronic medical records, home regimen and she is able to tolerate narcotics and opioids in the past.  -Endorsing no worsening pain issues.  I discontinued oral hydromorphone and IV hydromorphone and continued on her home regimen of hydrocodone given worsening mental status changes.    Physical deconditioning  -Appreciate input from PT, OT likely will be needing TCU placement    Will need social service evaluation as well for discharge planning disposition         Diet: Combination Diet Soft and Bite Sized Diet (level 6); Thin Liquids (level 0) (1:1 supervision for PO, ensure pt fully chewing/clearing oral cavity before next bite, fully upright, slow rate, liquid wash PRN.)    DVT Prophylaxis: Pneumatic Compression Devices  Almanza Catheter: Not present  Lines: None     Cardiac Monitoring: None  Code Status: No CPR- Do NOT Intubate      Clinically Significant Risk Factors                        # Overweight: Estimated body mass index is 25.07 kg/m  as calculated from the following:    Height as of this encounter: 1.549 m (5' 1\").    Weight as of this encounter: 60.2 kg (132 lb 11.2 oz).          Disposition Plan     Expected Discharge Date: Still needing inpatient care.  " Anticipating at least another 2 days in the hospital.  Destination: Anticipating TCU placement         Family updated over the phone    Prakash Byrd MD, MD  Hospitalist Service  [facility]  Securely message with [employer] (more info)  Text page via [employer] Paging/Directory   ______________________________________________________________________    Interval History   Continuing service care today.  Seen and examined.  Chart reviewed.  Case discussed with nursing service was gracious to be present at bedside during encounter.  Family updated over the phone.  This pleasant lady appears to be in better spirits this morning as she is more conversant, cooperative.  She thinks overall she is improving, decreasing intensity of coughing spells.  Still not having meaningful oral intake but denies any ongoing nausea, vomiting or abdominal pain.  No reported diarrhea.  Last night was taken off from oxygen support on minimal oxygen supplementation and 1 L by nasal cannula this morning.       Physical Exam   Vital Signs: Temp: 98  F (36.7  C) Temp src: Oral BP: (!) 170/61 Pulse: 82   Resp: 18 SpO2: 92 % O2 Device: None (Room air) Oxygen Delivery: 1 LPM  Weight: 132 lbs 11.2 oz    HEENT; Atraumatic, normocephalic, pinkish conjuctiva, pupils bilateral reactive   Skin: warm and moist, no rashes  Lungs: Fair air entry, earlier wheezing has improved  Heart: normal rate, normal rhythm, no rubs or gallops.   Abdomen: normal bowel sounds, no tenderness, no peritoneal signs, no guarding  Extremities: no deformities, no edema   Neuro; she is awake, alert, oriented to her name, following some simple verbal instructions, extremely hard of hearing, moves all extremities spontaneously  Psych; she is not combative nor agitated        Medical Decision Making       50 MINUTES SPENT BY ME on the date of service doing chart review, history, exam, documentation & further activities per the note.  MANAGEMENT DISCUSSED with  the following over the past 24 hours: yes   NOTE(S)/MEDICAL RECORDS REVIEWED over the past 24 hours: yes  Tests ORDERED & REVIEWED in the past 24 hours:      Data     I have personally reviewed the following data over the past 24 hrs:    5.5  \   7.6 (L)   / 169     139 105 18.5 /  94   3.4 23 0.38 (L) \       Procal: N/A CRP: 83.95 (H) Lactic Acid: N/A         Imaging results reviewed over the past 24 hrs:   No results found for this or any previous visit (from the past 24 hour(s)).

## 2023-01-28 NOTE — PLAN OF CARE
End of Shift Summary  For vital signs and complete assessments, please see documentation flowsheets.     Pertinent assessments: Alert, oriented to self only. VSS, sating in low 90's on room air. Infreq cough, denies SOB. Incontinent, purewick in place. Ax2 with pooja steady, repositioned as needed for comfort.     Major Shift Events: Weaned to room air.   Treatment Plan: Monitor pain, respiratory status. Lovenox, nebs, remdesivir, decadron, augmentin. Discharge TBD.  Bedside Nurse: Lisa Mccauley RN

## 2023-01-28 NOTE — CONSULTS
Care Management Initial Consult    General Information  Assessment completed with: Gael Randle  Type of CM/SW Visit: Initial Assessment    Primary Care Provider verified and updated as needed: Yes   Readmission within the last 30 days: no previous admission in last 30 days      Reason for Consult: discharge planning  Advance Care Planning:            Communication Assessment  Patient's communication style: spoken language (English or Bilingual)             Cognitive  Cognitive/Neuro/Behavioral: .WDL except  Level of Consciousness: alert, confused  Arousal Level: opens eyes spontaneously  Orientation: disoriented to, place, time, situation  Mood/Behavior: calm, cooperative  Best Language: 0 - No aphasia  Speech: clear, spontaneous    Living Environment:   People in home: alone     Current living Arrangements: assisted living  [facility] of Facility: [facility]   Able to return to prior arrangements: no  Living Arrangement Comments: TBD    Family/Social Support:  Care provided by: self  Provides care for: no one     Children          Description of Support System: Supportive, Involved         Current Resources:   Patient receiving home care services: No     Community Resources: None  Equipment currently used at home: walker, rolling, wheelchair, manual  Supplies currently used at home: Incontinence Supplies    Employment/Financial:  Employment Status: retired        Financial Concerns:             Lifestyle & Psychosocial Needs:  Social Determinants of Health     Tobacco Use: Medium Risk     Smoking Tobacco Use: Former     Smokeless Tobacco Use: Never     Passive Exposure: Not on file   Alcohol Use: Not on file   Financial Resource Strain: Not on file   Food Insecurity: Not on file   Transportation Needs: Not on file   Physical Activity: Not on file   Stress: Not on file   Social Connections: Not on file   Intimate Partner Violence: Not on file   Depression: Not on file   Housing Stability: Not on file        Functional Status:  Prior to admission patient needed assistance:   Dependent ADLs:: Ambulation-walker, Wheelchair-independent  Dependent IADLs:: Cleaning, Cooking, Laundry, Meal Preparation, Medication Management, Transportation  Assesssment of Functional Status: Not at baseline with ADL Functioning    Mental Health Status:   no          Chemical Dependency Status:   no             Values/Beliefs:  Spiritual, Cultural Beliefs, Restoration Practices, Values that affect care:                 Additional Information:  Chart reviewed. Spoke with pt's son Gael. Pt lives at [facility], she receives meals, med management and bathing.  She uses a wc (Manual) to get around.  Pt has been to [facility] and [facility] in the past.  Geal would like referrals sent to [facility] and [facility]. Will need transportation at discharge. He is aware of additional cost.     Veronica FRANCOIS, [facility]  Inpatient Care Coordination   [facility]   [phone]

## 2023-01-29 ENCOUNTER — APPOINTMENT (OUTPATIENT)
Dept: SPEECH THERAPY | Facility: CLINIC | Age: 88
DRG: 177 | End: 2023-01-29
Payer: COMMERCIAL

## 2023-01-29 ENCOUNTER — APPOINTMENT (OUTPATIENT)
Dept: PHYSICAL THERAPY | Facility: CLINIC | Age: 88
DRG: 177 | End: 2023-01-29
Payer: COMMERCIAL

## 2023-01-29 LAB
ANION GAP SERPL CALCULATED.3IONS-SCNC: 8 MMOL/L (ref 7–15)
BASOPHILS # BLD AUTO: 0 10E3/UL (ref 0–0.2)
BASOPHILS NFR BLD AUTO: 0 %
BUN SERPL-MCNC: 16.3 MG/DL (ref 8–23)
CALCIUM SERPL-MCNC: 8.8 MG/DL (ref 8.2–9.6)
CHLORIDE SERPL-SCNC: 106 MMOL/L (ref 98–107)
CREAT SERPL-MCNC: 0.37 MG/DL (ref 0.51–0.95)
CRP SERPL-MCNC: 44.64 MG/L
DEPRECATED HCO3 PLAS-SCNC: 26 MMOL/L (ref 22–29)
EOSINOPHIL # BLD AUTO: 0 10E3/UL (ref 0–0.7)
EOSINOPHIL NFR BLD AUTO: 0 %
ERYTHROCYTE [DISTWIDTH] IN BLOOD BY AUTOMATED COUNT: 14.5 % (ref 10–15)
GFR SERPL CREATININE-BSD FRML MDRD: >90 ML/MIN/1.73M2
GLUCOSE BLDC GLUCOMTR-MCNC: 108 MG/DL (ref 70–99)
GLUCOSE BLDC GLUCOMTR-MCNC: 117 MG/DL (ref 70–99)
GLUCOSE BLDC GLUCOMTR-MCNC: 162 MG/DL (ref 70–99)
GLUCOSE BLDC GLUCOMTR-MCNC: 172 MG/DL (ref 70–99)
GLUCOSE BLDC GLUCOMTR-MCNC: 87 MG/DL (ref 70–99)
GLUCOSE SERPL-MCNC: 95 MG/DL (ref 70–99)
HCT VFR BLD AUTO: 24 % (ref 35–47)
HGB BLD-MCNC: 7.8 G/DL (ref 11.7–15.7)
HOLD SPECIMEN: NORMAL
HOLD SPECIMEN: NORMAL
IMM GRANULOCYTES # BLD: 0.1 10E3/UL
IMM GRANULOCYTES NFR BLD: 2 %
LYMPHOCYTES # BLD AUTO: 1 10E3/UL (ref 0.8–5.3)
LYMPHOCYTES NFR BLD AUTO: 18 %
MCH RBC QN AUTO: 32.5 PG (ref 26.5–33)
MCHC RBC AUTO-ENTMCNC: 32.5 G/DL (ref 31.5–36.5)
MCV RBC AUTO: 100 FL (ref 78–100)
MONOCYTES # BLD AUTO: 0.5 10E3/UL (ref 0–1.3)
MONOCYTES NFR BLD AUTO: 9 %
NEUTROPHILS # BLD AUTO: 3.8 10E3/UL (ref 1.6–8.3)
NEUTROPHILS NFR BLD AUTO: 71 %
NRBC # BLD AUTO: 0 10E3/UL
NRBC BLD AUTO-RTO: 0 /100
PLATELET # BLD AUTO: 178 10E3/UL (ref 150–450)
POTASSIUM SERPL-SCNC: 3.2 MMOL/L (ref 3.4–5.3)
RBC # BLD AUTO: 2.4 10E6/UL (ref 3.8–5.2)
SODIUM SERPL-SCNC: 140 MMOL/L (ref 136–145)
WBC # BLD AUTO: 5.4 10E3/UL (ref 4–11)

## 2023-01-29 PROCEDURE — C9113 INJ PANTOPRAZOLE SODIUM, VIA: HCPCS | Performed by: INTERNAL MEDICINE

## 2023-01-29 PROCEDURE — 92526 ORAL FUNCTION THERAPY: CPT | Mod: GN

## 2023-01-29 PROCEDURE — 120N000001 HC R&B MED SURG/OB

## 2023-01-29 PROCEDURE — 97530 THERAPEUTIC ACTIVITIES: CPT | Mod: GP

## 2023-01-29 PROCEDURE — 97110 THERAPEUTIC EXERCISES: CPT | Mod: GP

## 2023-01-29 PROCEDURE — 250N000013 HC RX MED GY IP 250 OP 250 PS 637: Performed by: INTERNAL MEDICINE

## 2023-01-29 PROCEDURE — 250N000011 HC RX IP 250 OP 636: Performed by: INTERNAL MEDICINE

## 2023-01-29 PROCEDURE — 86140 C-REACTIVE PROTEIN: CPT | Performed by: INTERNAL MEDICINE

## 2023-01-29 PROCEDURE — 36415 COLL VENOUS BLD VENIPUNCTURE: CPT | Performed by: INTERNAL MEDICINE

## 2023-01-29 PROCEDURE — 258N000003 HC RX IP 258 OP 636: Performed by: INTERNAL MEDICINE

## 2023-01-29 PROCEDURE — 80048 BASIC METABOLIC PNL TOTAL CA: CPT | Performed by: INTERNAL MEDICINE

## 2023-01-29 PROCEDURE — 85025 COMPLETE CBC W/AUTO DIFF WBC: CPT | Performed by: INTERNAL MEDICINE

## 2023-01-29 PROCEDURE — 250N000013 HC RX MED GY IP 250 OP 250 PS 637: Performed by: FAMILY MEDICINE

## 2023-01-29 PROCEDURE — 99233 SBSQ HOSP IP/OBS HIGH 50: CPT | Performed by: INTERNAL MEDICINE

## 2023-01-29 RX ADMIN — LISINOPRIL 30 MG: 20 TABLET ORAL at 09:46

## 2023-01-29 RX ADMIN — CARVEDILOL 12.5 MG: 12.5 TABLET, FILM COATED ORAL at 09:46

## 2023-01-29 RX ADMIN — GABAPENTIN 300 MG: 300 CAPSULE ORAL at 21:06

## 2023-01-29 RX ADMIN — MICONAZOLE NITRATE: 20 POWDER TOPICAL at 09:48

## 2023-01-29 RX ADMIN — ACETAMINOPHEN 650 MG: 325 TABLET, FILM COATED ORAL at 21:06

## 2023-01-29 RX ADMIN — GABAPENTIN 300 MG: 300 CAPSULE ORAL at 16:51

## 2023-01-29 RX ADMIN — DEXAMETHASONE 6 MG: 2 TABLET ORAL at 09:45

## 2023-01-29 RX ADMIN — CLONIDINE HYDROCHLORIDE 0.1 MG: 0.1 TABLET ORAL at 21:06

## 2023-01-29 RX ADMIN — Medication 250 MG: at 09:46

## 2023-01-29 RX ADMIN — SENNOSIDES AND DOCUSATE SODIUM 1 TABLET: 50; 8.6 TABLET ORAL at 21:06

## 2023-01-29 RX ADMIN — HYDROXYZINE HYDROCHLORIDE 10 MG: 10 TABLET ORAL at 09:45

## 2023-01-29 RX ADMIN — REMDESIVIR 100 MG: 100 INJECTION, POWDER, LYOPHILIZED, FOR SOLUTION INTRAVENOUS at 16:50

## 2023-01-29 RX ADMIN — ISOSORBIDE DINITRATE 10 MG: 10 TABLET ORAL at 21:06

## 2023-01-29 RX ADMIN — MICONAZOLE NITRATE: 20 POWDER TOPICAL at 21:06

## 2023-01-29 RX ADMIN — AMOXICILLIN AND CLAVULANATE POTASSIUM 1 TABLET: 875; 125 TABLET, FILM COATED ORAL at 09:46

## 2023-01-29 RX ADMIN — AMOXICILLIN AND CLAVULANATE POTASSIUM 1 TABLET: 875; 125 TABLET, FILM COATED ORAL at 21:06

## 2023-01-29 RX ADMIN — SODIUM CHLORIDE 50 ML: 9 INJECTION, SOLUTION INTRAVENOUS at 16:52

## 2023-01-29 RX ADMIN — PANTOPRAZOLE SODIUM 40 MG: 40 INJECTION, POWDER, FOR SOLUTION INTRAVENOUS at 09:41

## 2023-01-29 RX ADMIN — CARVEDILOL 25 MG: 25 TABLET, FILM COATED ORAL at 21:06

## 2023-01-29 RX ADMIN — ACETAMINOPHEN 650 MG: 325 TABLET, FILM COATED ORAL at 16:51

## 2023-01-29 RX ADMIN — HYDROXYZINE HYDROCHLORIDE 10 MG: 10 TABLET ORAL at 21:06

## 2023-01-29 RX ADMIN — SERTRALINE HYDROCHLORIDE 100 MG: 100 TABLET ORAL at 09:46

## 2023-01-29 RX ADMIN — GABAPENTIN 300 MG: 300 CAPSULE ORAL at 09:46

## 2023-01-29 RX ADMIN — ISOSORBIDE DINITRATE 10 MG: 10 TABLET ORAL at 09:46

## 2023-01-29 RX ADMIN — ENOXAPARIN SODIUM 40 MG: 40 INJECTION SUBCUTANEOUS at 12:23

## 2023-01-29 RX ADMIN — ISOSORBIDE DINITRATE 10 MG: 10 TABLET ORAL at 16:51

## 2023-01-29 RX ADMIN — ACETAMINOPHEN 650 MG: 325 TABLET, FILM COATED ORAL at 09:46

## 2023-01-29 RX ADMIN — AMLODIPINE BESYLATE 10 MG: 10 TABLET ORAL at 09:47

## 2023-01-29 ASSESSMENT — ACTIVITIES OF DAILY LIVING (ADL)
ADLS_ACUITY_SCORE: 56

## 2023-01-29 NOTE — PLAN OF CARE
Goal Outcome Evaluation:    End of Shift Summary  For vital signs and complete assessments, please see documentation flowsheets.     Pertinent assessments:   Alert, oriented to self only. Up with sarasteady. Denies pain. On 1L NC to keep sats >92%. Dry cough. Purewick in place, good output. One small bowel movement this am. Poor appetite. Mild nausea in am, zofran given w/ relief.     Major Shift Events: SLP advanced diet to easy to chew.   Treatment Plan: Monitor pain, respiratory status. Lovenox, nebs, remdesivir, decadron, augmentin. Discharge TBD.

## 2023-01-29 NOTE — PROGRESS NOTES
[facility]    Medicine Progress Note - Hospitalist Service    Date of Admission:  1/23/2023    Assessment & Plan      Daya Ignacio is a 89 year old female with known prior history of hypertension, prior C. difficile colitis, chronic pain on chronic narcotics, multiple compression fractures in the past, ESBL UTI, anxiety, depression, cognitive impairment and reportedly lives in an assisted living facility set up and unfortunately has to presents in the emergency room by emergency personnel earlier after she was found having jerking-like sensation and concerns for aspiration while partaking her dinner food tray.  During EMS evaluation she was found to be hypoxic at 61% via room air but were able to increase to close to 90% utilizing 15 L of oxygen in route here    Afternoon of 1/24/2023 she had worsening mental status, which culminated in vomiting.  Further work-up is being pursued.    Problem list:  Principal Problem:    Aspiration pneumonitis (H)  Possible aspiration pneumonia  Worsening leukocytosis  Urinary tract infection with isolated sensitive E. Coli  Prior history of ESBL urinary tract infection.   Acute hypoxic respiratory failure likely combination of possible aspiration pneumonia now with COVID-19 infection and pneumonia    Active Problems:    Hypertension    Acute respiratory failure with hypoxia (H)  -Infectious encephalopathy-improving    -No further fever spike   -Subsequent procalcitonin low risk   -COVID-19 screen and influenza screen pursued due to fever spike last 1/26/2023   -Acute COVID-19 infection with possible underlying COVID-19 pneumonia with hypoxia   -Started on remdesivir and Decadron last 1/26/2023   -Elevated CRP and will be continued to be monitored   -CRP continued to show downward trend with most recent level at 44  -Leukocytosis resolved  -Continue to wean and titrate oxygen as able currently at 1-2 L by nasal cannula  -Change breathing treatment to as  needed basis  -Finish oral antibiotics course for isolated sensitive E. coli with UTI.  Will also cover for possibility of aspiration pneumonitis and pneumonia earlier    -COVID-19 isolation precautions    -Blood cultures remain no growth up-to-date  -Now with increasing blood pressure levels, will resume all of her oral home regimen antihypertensive  -Earlier showing alteration in mental state.  Received 2 doses of 25 mg Seroquel  -Seroquel discontinued as patient became very sedated and lethargic.  Continue to hold any further anxiolytics    Altered mental status, etiology unclear at this time.  Differential includes delirium, postictal state  -No obvious seizure-like activity  -Reassuring CT of the head  -Can be infectious encephalopathy    Chronic pain on chronic narcotics  -I reviewed her electronic medical records, home regimen and she is able to tolerate narcotics and opioids in the past.  -Endorsing no worsening pain issues.  I discontinued oral hydromorphone and IV hydromorphone and continued on her home regimen of hydrocodone given worsening mental status changes.    Physical deconditioning  -Appreciate input from PT, OT likely will be needing TCU placement    Stable acute on chronic anemia likely of chronic illness  -Most likely worsening anemia from frequent blood draws  -No bleeding tendencies here  -Does not need packed RBC transfusion yet    Will need social service evaluation as well for discharge planning disposition         Diet: Combination Diet Easy to Chew (level 7); Thin Liquids (level 0)    DVT Prophylaxis: Pneumatic Compression Devices  Almanza Catheter: Not present  Lines: None     Cardiac Monitoring: None  Code Status: No CPR- Do NOT Intubate      Clinically Significant Risk Factors        # Hypokalemia: Lowest K = 3.2 mmol/L in last 2 days, will replace as needed                 # Overweight: Estimated body mass index is 25.07 kg/m  as calculated from the following:    Height as of this  "encounter: 1.549 m (5' 1\").    Weight as of this encounter: 60.2 kg (132 lb 11.2 oz).          Disposition Plan     Expected Discharge Date: Still needing inpatient care.  Anticipating at least another 2 days in the hospital.  Destination: Anticipating TCU placement             Prakash Byrd MD, MD  Hospitalist Service  [facility]  Securely message with [employer] (more info)  Text page via [employer] Paging/Directory   ______________________________________________________________________    Interval History   Continuing service care today.  Seen and examined.  Chart reviewed.  Case discussed with nursing service.  Earlier I met this a pleasant lady while she is sleeping but easily aroused with minimal verbal stimuli.  She was providing appropriate answers to the questions.  She mentioned that her main concern is still having intermittent coughing spells but denies any worsening shortness of breath.  Tolerating some of her breakfast food tray reportedly like 25% of it.  No reported nausea or vomiting.  No bleeding tendencies.  On minimal oxygen support currently between 1 to 2 L of oxygen by nasal cannula.  Currently afebrile.   Hard of hearing but appears to be at baseline mental levels but earlier nursing service noted that she might be having some intermittent bouts of auditory hallucinations.        Physical Exam   Vital Signs: Temp: 98.6  F (37  C) Temp src: Oral BP: (!) 147/56 Pulse: 72   Resp: 20 SpO2: 97 % O2 Device: Nasal cannula Oxygen Delivery: 2 LPM  Weight: 132 lbs 11.2 oz    HEENT; Atraumatic, normocephalic, pinkish conjuctiva, pupils bilateral reactive   Skin: warm and moist, no rashes  Lungs: Fair air entry, earlier wheezing has improved  Heart: normal rate, normal rhythm, no rubs or gallops.   Abdomen: normal bowel sounds, no tenderness, no peritoneal signs, no guarding  Extremities: no deformities, no edema   Neuro; she is awake, alert, oriented to her name and place, " following some simple verbal instructions, extremely hard of hearing, moves all extremities spontaneously  Psych; she is not combative nor agitated        Medical Decision Making       50 MINUTES SPENT BY ME on the date of service doing chart review, history, exam, documentation & further activities per the note.  MANAGEMENT DISCUSSED with the following over the past 24 hours: yes   NOTE(S)/MEDICAL RECORDS REVIEWED over the past 24 hours: yes  Tests ORDERED & REVIEWED in the past 24 hours:      Data     I have personally reviewed the following data over the past 24 hrs:    5.4  \   7.8 (L)   / 178     140 106 16.3 /  87   3.2 (L) 26 0.37 (L) \       Procal: N/A CRP: 44.64 (H) Lactic Acid: N/A         Imaging results reviewed over the past 24 hrs:   No results found for this or any previous visit (from the past 24 hour(s)).

## 2023-01-29 NOTE — PLAN OF CARE
End of Shift Summary  For vital signs and complete assessments, please see documentation flowsheets.     Pertinent assessments: Alert, oriented to self only. Sats at 88% on 1L NC, placed on 3L overnight. Sating in upper 90's. Denies SOB and pain. Infreq cough. Incontinent, purewick in place. One BM this shift. Ax2 with pooja steady.     Major Shift Events: None  Treatment Plan: Monitor pain, respiratory status. Lovenox, nebs, remdesivir, decadron, augmentin. Discharge TBD.  Bedside Nurse: Lisa Mccauley RN

## 2023-01-30 ENCOUNTER — APPOINTMENT (OUTPATIENT)
Dept: SPEECH THERAPY | Facility: CLINIC | Age: 88
DRG: 177 | End: 2023-01-30
Payer: COMMERCIAL

## 2023-01-30 ENCOUNTER — APPOINTMENT (OUTPATIENT)
Dept: OCCUPATIONAL THERAPY | Facility: CLINIC | Age: 88
DRG: 177 | End: 2023-01-30
Payer: COMMERCIAL

## 2023-01-30 ENCOUNTER — APPOINTMENT (OUTPATIENT)
Dept: PHYSICAL THERAPY | Facility: CLINIC | Age: 88
DRG: 177 | End: 2023-01-30
Payer: COMMERCIAL

## 2023-01-30 LAB
ANION GAP SERPL CALCULATED.3IONS-SCNC: 8 MMOL/L (ref 7–15)
BACTERIA BLD CULT: NO GROWTH
BACTERIA BLD CULT: NO GROWTH
BUN SERPL-MCNC: 13.1 MG/DL (ref 8–23)
CALCIUM SERPL-MCNC: 8.8 MG/DL (ref 8.2–9.6)
CHLORIDE SERPL-SCNC: 104 MMOL/L (ref 98–107)
CREAT SERPL-MCNC: 0.4 MG/DL (ref 0.51–0.95)
CRP SERPL-MCNC: 22.19 MG/L
DEPRECATED HCO3 PLAS-SCNC: 27 MMOL/L (ref 22–29)
GFR SERPL CREATININE-BSD FRML MDRD: >90 ML/MIN/1.73M2
GLUCOSE BLDC GLUCOMTR-MCNC: 117 MG/DL (ref 70–99)
GLUCOSE BLDC GLUCOMTR-MCNC: 136 MG/DL (ref 70–99)
GLUCOSE BLDC GLUCOMTR-MCNC: 176 MG/DL (ref 70–99)
GLUCOSE BLDC GLUCOMTR-MCNC: 88 MG/DL (ref 70–99)
GLUCOSE SERPL-MCNC: 88 MG/DL (ref 70–99)
POTASSIUM SERPL-SCNC: 3.3 MMOL/L (ref 3.4–5.3)
SODIUM SERPL-SCNC: 139 MMOL/L (ref 136–145)

## 2023-01-30 PROCEDURE — 92526 ORAL FUNCTION THERAPY: CPT | Mod: GN | Performed by: SPEECH-LANGUAGE PATHOLOGIST

## 2023-01-30 PROCEDURE — 250N000011 HC RX IP 250 OP 636: Performed by: INTERNAL MEDICINE

## 2023-01-30 PROCEDURE — 97535 SELF CARE MNGMENT TRAINING: CPT | Mod: GO

## 2023-01-30 PROCEDURE — 258N000003 HC RX IP 258 OP 636: Performed by: INTERNAL MEDICINE

## 2023-01-30 PROCEDURE — 86140 C-REACTIVE PROTEIN: CPT | Performed by: INTERNAL MEDICINE

## 2023-01-30 PROCEDURE — 99232 SBSQ HOSP IP/OBS MODERATE 35: CPT | Performed by: INTERNAL MEDICINE

## 2023-01-30 PROCEDURE — 36415 COLL VENOUS BLD VENIPUNCTURE: CPT | Performed by: INTERNAL MEDICINE

## 2023-01-30 PROCEDURE — 250N000013 HC RX MED GY IP 250 OP 250 PS 637: Performed by: FAMILY MEDICINE

## 2023-01-30 PROCEDURE — 120N000001 HC R&B MED SURG/OB

## 2023-01-30 PROCEDURE — 80048 BASIC METABOLIC PNL TOTAL CA: CPT | Performed by: INTERNAL MEDICINE

## 2023-01-30 PROCEDURE — 97530 THERAPEUTIC ACTIVITIES: CPT | Mod: GP

## 2023-01-30 PROCEDURE — 250N000013 HC RX MED GY IP 250 OP 250 PS 637: Performed by: INTERNAL MEDICINE

## 2023-01-30 RX ORDER — PANTOPRAZOLE SODIUM 40 MG/1
40 TABLET, DELAYED RELEASE ORAL
Status: DISCONTINUED | OUTPATIENT
Start: 2023-01-30 | End: 2023-02-10 | Stop reason: HOSPADM

## 2023-01-30 RX ADMIN — MICONAZOLE NITRATE: 20 POWDER TOPICAL at 21:18

## 2023-01-30 RX ADMIN — ACETAMINOPHEN 650 MG: 325 TABLET, FILM COATED ORAL at 21:12

## 2023-01-30 RX ADMIN — SERTRALINE HYDROCHLORIDE 100 MG: 100 TABLET ORAL at 09:03

## 2023-01-30 RX ADMIN — ACETAMINOPHEN 650 MG: 325 TABLET, FILM COATED ORAL at 09:03

## 2023-01-30 RX ADMIN — ACETAMINOPHEN 650 MG: 325 TABLET, FILM COATED ORAL at 17:08

## 2023-01-30 RX ADMIN — ISOSORBIDE DINITRATE 10 MG: 10 TABLET ORAL at 21:12

## 2023-01-30 RX ADMIN — CLONIDINE HYDROCHLORIDE 0.1 MG: 0.1 TABLET ORAL at 21:12

## 2023-01-30 RX ADMIN — MICONAZOLE NITRATE: 20 POWDER TOPICAL at 09:09

## 2023-01-30 RX ADMIN — ISOSORBIDE DINITRATE 10 MG: 10 TABLET ORAL at 09:03

## 2023-01-30 RX ADMIN — REMDESIVIR 100 MG: 100 INJECTION, POWDER, LYOPHILIZED, FOR SOLUTION INTRAVENOUS at 17:07

## 2023-01-30 RX ADMIN — GABAPENTIN 300 MG: 300 CAPSULE ORAL at 09:03

## 2023-01-30 RX ADMIN — HYDROXYZINE HYDROCHLORIDE 10 MG: 10 TABLET ORAL at 21:12

## 2023-01-30 RX ADMIN — ENOXAPARIN SODIUM 40 MG: 40 INJECTION SUBCUTANEOUS at 12:48

## 2023-01-30 RX ADMIN — ISOSORBIDE DINITRATE 10 MG: 10 TABLET ORAL at 17:08

## 2023-01-30 RX ADMIN — LISINOPRIL 30 MG: 20 TABLET ORAL at 09:04

## 2023-01-30 RX ADMIN — CARVEDILOL 25 MG: 25 TABLET, FILM COATED ORAL at 21:12

## 2023-01-30 RX ADMIN — AMLODIPINE BESYLATE 10 MG: 10 TABLET ORAL at 09:03

## 2023-01-30 RX ADMIN — PANTOPRAZOLE SODIUM 40 MG: 40 TABLET, DELAYED RELEASE ORAL at 12:48

## 2023-01-30 RX ADMIN — GABAPENTIN 300 MG: 300 CAPSULE ORAL at 21:12

## 2023-01-30 RX ADMIN — HYDROXYZINE HYDROCHLORIDE 10 MG: 10 TABLET ORAL at 09:03

## 2023-01-30 RX ADMIN — CARVEDILOL 12.5 MG: 12.5 TABLET, FILM COATED ORAL at 09:04

## 2023-01-30 RX ADMIN — DEXAMETHASONE 6 MG: 2 TABLET ORAL at 09:03

## 2023-01-30 RX ADMIN — Medication 250 MG: at 09:04

## 2023-01-30 RX ADMIN — GABAPENTIN 300 MG: 300 CAPSULE ORAL at 17:08

## 2023-01-30 RX ADMIN — SODIUM CHLORIDE 50 ML: 9 INJECTION, SOLUTION INTRAVENOUS at 17:11

## 2023-01-30 ASSESSMENT — ACTIVITIES OF DAILY LIVING (ADL)
ADLS_ACUITY_SCORE: 52
ADLS_ACUITY_SCORE: 56
ADLS_ACUITY_SCORE: 56
ADLS_ACUITY_SCORE: 52
ADLS_ACUITY_SCORE: 56
ADLS_ACUITY_SCORE: 52
ADLS_ACUITY_SCORE: 56
ADLS_ACUITY_SCORE: 56

## 2023-01-30 NOTE — PLAN OF CARE
Vitals: slightly elevated BP, pt is taking scheduled BP meds, asymptomatic, vitally stable  Neuro: Alert to self, disoriented to time, place situation   Cardiac: normal rate and rhythm  Resp: on O2 2L/nc - saturating above 90s  GI:  had small BM 2x, poor appetite, needs help with ordering, no n/v  : incontinent currently no brief in bed, rested perineal area from purewick, Micatin powder applied  Skin: redness over sacum/coccyx and some over groin  Activity: Ax1-2 with gait belt and walker  Pain: complains of some back pain - takes scheduled tylenol     worked with PT this afternoon    Plan: Remdesivir, Decadron, Lovenox, O2 - wean as able

## 2023-01-30 NOTE — CONSULTS
"CLINICAL NUTRITION SERVICES  -  ASSESSMENT NOTE      Recommendations:   - Diet per SLP/MD.  Appreciate any encouragement or assistance at meal times provided by nursing.  - 4 oz Ensure offerings w/ meals TID.       Malnutrition Diagnosis: Unable to determine due to insufficient information (wt and PO trends PTA) + no NFPE (COVID+)          REASON FOR ASSESSMENT  Daya Ignacio is a 89 year old female seen by Registered Dietitian for LOS.    PMH of: HTN, C. Diff, chronic pain, multiple compression fractures, UTI, cognitive impairment living in [facility].    Admit 2/2: Acute respiratory failure, aspiration event, suspected aspiration PNA, found to be COVID+ during admit.    NUTRITION HISTORY  - Information obtained from chart as patient oriented to self.  Not appropriate to call into room per discussion with RN and review of chart.  - Allergies: NKFA.      CURRENT NUTRITION ORDERS  Diet Order:     IDDSI 7/thins    Current Intake/Tolerance:  SLP consulted and initially was NPO following bedside eval until VFSS completed, diet then advanced to IDDSI 6/thins and further advanced to above 1/28.  Mostly 25% intakes since admission per flowsheet review.  Consistently receiving meals per meal system review.  Suspect PO barriers of decreased appetite, mentation, lethargy/decreased ROBE at times, weakness.      Obtained from Chart/Interdisciplinary Team:  - No documentation of PI  - Stooling patterns reviewed  - Weaned to 2 L oxymask    ANTHROPOMETRICS  Height: 5' 1\"  Weight: 132 lbs 11.2 oz  Body mass index is 25.07 kg/m .  Weight Status:  Overweight BMI 25-29.9  Weight History:  Wt Readings from Last 10 Encounters:   01/27/23 60.2 kg (132 lb 11.2 oz)   10/06/21 52.4 kg (115 lb 8 oz)   08/15/21 58.1 kg (128 lb 1.6 oz)   06/29/21 56.2 kg (124 lb)   05/08/21 58.2 kg (128 lb 6.4 oz)   11/18/20 57.6 kg (127 lb)   10/30/20 57.6 kg (127 lb)   09/30/20 56.2 kg (124 lb)   05/28/20 55.2 kg (121 lb 9.6 oz)   05/07/20 59.6 kg (131 lb 8 oz) "     - No current documentation of edema.  No recent wt trends available (see care everywhere).      LABS  Labs reviewed.      MEDICATIONS  Medications reviewed.      ASSESSED NUTRITION NEEDS PER APPROVED PRACTICE GUIDELINES:    Dosing Weight 60 kg   Estimated Energy Needs: 25-30 Kcal/Kg  Justification: maintenance  Estimated Protein Needs: 1-1.2+ g pro/Kg  Justification: preservation of lean body mass, advanced age  Estimated Fluid Needs: per MD      NUTRITION DIAGNOSIS:  Inadequate oral intake related to suspect decreased appetite, mentation, lethargy/ROBE at times, weakness as evidenced by meeting <50% nutrition needs daily since admission.    NUTRITION INTERVENTIONS  Recommendations / Nutrition Prescription  See above.      Implementation  Nutrition education: Not appropriate at this time due to patient condition.    Medical Food Supplement: As above.    Collaboration and Referral of Nutrition care: Discussed POC with team during rounds and supplement offerings/PO intakes w/ RN.    Nutrition Goals  Patient to consume at least 50% of meals/supplements TID to show improvement in PO intakes.      MONITORING AND EVALUATION:  Progress towards goals will be monitored and evaluated per protocol and Practice Guidelines          Lizzy Hilliard RDN, LD  Clinical Dietitian  3rd floor/ICU: [phone]  All other floors: [phone]  Weekend/holiday: [phone]  Office: [phone]

## 2023-01-30 NOTE — PLAN OF CARE
Pertinent assessments: Alert, oriented to self only. Desat to 75-80% on 1L NC overnight, placed on 5L oxymask. Sating in upper 90's. Denies SOB, infreq cough. Incontinent, purewick in place. Ax2 with pooja arias     Major Shift Events: None  Treatment Plan: Monitor pain, respiratory status. Lovenox, nebs, remdesivir, decadron, augmentin. Discharge TBD.  Bedside Nurse: Lisa Mccauley RN

## 2023-01-30 NOTE — PROGRESS NOTES
[facility]    Medicine Progress Note - Hospitalist Service    Date of Admission:  1/23/2023    Assessment & Plan      Daya Ignacio is a 89 year old female with known prior history of hypertension, prior C. difficile colitis, chronic pain on chronic narcotics, multiple compression fractures in the past, ESBL UTI, anxiety, depression, cognitive impairment and reportedly lives in an assisted living facility set up and unfortunately has to presents in the emergency room by emergency personnel earlier after she was found having jerking-like sensation and concerns for aspiration while partaking her dinner food tray.  During EMS evaluation she was found to be hypoxic at 61% via room air but were able to increase to close to 90% utilizing 15 L of oxygen in route here    Afternoon of 1/24/2023 she had worsening mental status, which culminated in vomiting.  Further work-up is being pursued.    Problem list:  Principal Problem:    Aspiration pneumonitis (H)  Possible aspiration pneumonia  Worsening leukocytosis  Urinary tract infection with isolated sensitive E. Coli  Prior history of ESBL urinary tract infection.   Acute hypoxic respiratory failure likely combination of possible aspiration pneumonia now with COVID-19 infection and pneumonia    Active Problems:    Hypertension    Acute respiratory failure with hypoxia (H)  -Infectious encephalopathy-improving    -No further fever spike   -Subsequent procalcitonin low risk   -COVID-19 screen and influenza screen pursued due to fever spike last 1/26/2023   -Acute COVID-19 infection with possible underlying COVID-19 pneumonia with hypoxia   -Started on remdesivir and Decadron last 1/26/2023   -Elevated CRP and will be continued to be monitored   -CRP continued to show downward trend with most recent level at 22  -Leukocytosis resolved  -Continue to wean and titrate oxygen as able currently at 1-2 L by nasal cannula  -Change breathing treatment to as  needed basis  -Finish oral antibiotics course for isolated sensitive E. coli with UTI.  Will also cover for possibility of aspiration pneumonitis and pneumonia earlier    -COVID-19 isolation precautions    -Blood cultures remain no growth up-to-date  -Now with increasing blood pressure levels, will resume all of her oral home regimen antihypertensive  -Earlier showing alteration in mental state.  Received 2 doses of 25 mg Seroquel  -Seroquel discontinued as patient became very sedated and lethargic.  Continue to hold any further anxiolytics    Altered mental status, etiology unclear at this time.  Differential includes delirium, postictal state  -No obvious seizure-like activity  -Reassuring CT of the head  -Can be infectious encephalopathy    Chronic pain on chronic narcotics  -I reviewed her electronic medical records, home regimen and she is able to tolerate narcotics and opioids in the past.  -Endorsing no worsening pain issues.  I discontinued oral hydromorphone and IV hydromorphone and continued on her home regimen of hydrocodone given worsening mental status changes.    Physical deconditioning  -Appreciate input from PT, OT likely will be needing TCU placement    Stable acute on chronic anemia likely of chronic illness  -Most likely worsening anemia from frequent blood draws  -No bleeding tendencies here  -Does not need packed RBC transfusion yet    Will need social service evaluation as well for discharge planning disposition         Diet: Combination Diet Easy to Chew (level 7); Thin Liquids (level 0)  Snacks/Supplements Adult: Ensure Enlive; With Meals    DVT Prophylaxis: Pneumatic Compression Devices  Almanza Catheter: Not present  Lines: None     Cardiac Monitoring: None  Code Status: No CPR- Do NOT Intubate      Clinically Significant Risk Factors        # Hypokalemia: Lowest K = 3.2 mmol/L in last 2 days, will replace as needed                 # Overweight: Estimated body mass index is 25.07 kg/m  as  "calculated from the following:    Height as of this encounter: 1.549 m (5' 1\").    Weight as of this encounter: 60.2 kg (132 lb 11.2 oz).          Disposition Plan     Expected Discharge Date: Still needing inpatient care.  Anticipating at least another 1-2 days in the hospital.  Destination: Anticipating TCU placement versus back to prior living arrangements             Prakash Byrd MD, MD  Hospitalist Service  [facility]  Securely message with [employer] (more info)  Text page via [employer] Paging/Directory   ______________________________________________________________________    Interval History   Continuing service care today.  Seen and examined.  Chart reviewed.  Case discussed with nursing service.  I met this a pleasant lady while she is sleeping comfortably in bed but easily aroused with minimal verbal stimuli.  Currently requiring only minimal oxygen support this morning at 1 to 2 L by nasal cannula.  Her mental state appears to be at baseline.  She is conversant, somewhat hard of hearing but participatory.  No reported issues of being agitated nor combative overnight.  No bleeding tendencies.  Not a whole lot of oral intake.  No reported vomiting nor diarrhea.            Physical Exam   Vital Signs: Temp: 98.3  F (36.8  C) Temp src: Oral BP: (!) 149/60 Pulse: 70   Resp: 20 SpO2: 98 % O2 Device: Oxymask Oxygen Delivery: 2 LPM  Weight: 132 lbs 11.2 oz    HEENT; Atraumatic, normocephalic, pinkish conjuctiva, pupils bilateral reactive   Skin: warm and moist, no rashes  Lungs: Fair air entry, earlier wheezing has improved  Heart: normal rate, normal rhythm, no rubs or gallops.   Abdomen: normal bowel sounds, no tenderness, no peritoneal signs, no guarding  Extremities: no deformities, no edema   Neuro; she is awake, alert, oriented to her name and place, following some simple verbal instructions, extremely hard of hearing, moves all extremities spontaneously  Psych; she is not combative " nor agitated        Medical Decision Making       50 MINUTES SPENT BY ME on the date of service doing chart review, history, exam, documentation & further activities per the note.  MANAGEMENT DISCUSSED with the following over the past 24 hours: yes   NOTE(S)/MEDICAL RECORDS REVIEWED over the past 24 hours: yes  Tests ORDERED & REVIEWED in the past 24 hours:      Data     I have personally reviewed the following data over the past 24 hrs:    N/A  \   N/A   / N/A     139 104 13.1 /  88   3.3 (L) 27 0.40 (L) \       Procal: N/A CRP: 22.19 (H) Lactic Acid: N/A         Imaging results reviewed over the past 24 hrs:   No results found for this or any previous visit (from the past 24 hour(s)).

## 2023-01-30 NOTE — PLAN OF CARE
Goal Outcome Evaluation:    End of Shift Summary  For vital signs and complete assessments, please see documentation flowsheets.     Pertinent assessments: Alert, oriented to self only. Up with sarasteady. Denies pain. On 1-2L NC to keep sats >92%. Dry cough. Purewick in place, good output. Appetite continues to be poor. Ate 25% of breakfast and ice cream for evening meal.    Major Shift Events: None  Treatment Plan: Monitor pain, respiratory status. Lovenox, nebs, remdesivir, decadron, augmentin. Discharge TBD.

## 2023-01-30 NOTE — PLAN OF CARE
Pertinent assessments: AxO to self only. Desat to 88% on 2L NC, rate increased to 3L. Sating in upper 90's. Denies SOB. Incontinent, purewick utilized. Ax2 with pooja steady. Takes pill 1 at a time with chocolate pudding.     Major Shift Events: None    Treatment Plan: Monitor respiratory status, Lovenox. Discharge to TCU TBD.    Bedside Nurse: Ever Peralta RN

## 2023-01-31 ENCOUNTER — APPOINTMENT (OUTPATIENT)
Dept: PHYSICAL THERAPY | Facility: CLINIC | Age: 88
DRG: 177 | End: 2023-01-31
Payer: COMMERCIAL

## 2023-01-31 ENCOUNTER — APPOINTMENT (OUTPATIENT)
Dept: OCCUPATIONAL THERAPY | Facility: CLINIC | Age: 88
DRG: 177 | End: 2023-01-31
Payer: COMMERCIAL

## 2023-01-31 LAB
CRP SERPL-MCNC: 13.74 MG/L
GLUCOSE BLDC GLUCOMTR-MCNC: 101 MG/DL (ref 70–99)
GLUCOSE BLDC GLUCOMTR-MCNC: 118 MG/DL (ref 70–99)
GLUCOSE BLDC GLUCOMTR-MCNC: 171 MG/DL (ref 70–99)
GLUCOSE BLDC GLUCOMTR-MCNC: 191 MG/DL (ref 70–99)
GLUCOSE BLDC GLUCOMTR-MCNC: 84 MG/DL (ref 70–99)
POTASSIUM SERPL-SCNC: 3.3 MMOL/L (ref 3.4–5.3)
POTASSIUM SERPL-SCNC: 4.2 MMOL/L (ref 3.4–5.3)

## 2023-01-31 PROCEDURE — 250N000013 HC RX MED GY IP 250 OP 250 PS 637: Performed by: INTERNAL MEDICINE

## 2023-01-31 PROCEDURE — 99232 SBSQ HOSP IP/OBS MODERATE 35: CPT | Performed by: INTERNAL MEDICINE

## 2023-01-31 PROCEDURE — 97110 THERAPEUTIC EXERCISES: CPT | Mod: GO

## 2023-01-31 PROCEDURE — 250N000012 HC RX MED GY IP 250 OP 636 PS 637: Performed by: INTERNAL MEDICINE

## 2023-01-31 PROCEDURE — 250N000013 HC RX MED GY IP 250 OP 250 PS 637: Performed by: FAMILY MEDICINE

## 2023-01-31 PROCEDURE — 120N000001 HC R&B MED SURG/OB

## 2023-01-31 PROCEDURE — 97530 THERAPEUTIC ACTIVITIES: CPT | Mod: GP

## 2023-01-31 PROCEDURE — 97535 SELF CARE MNGMENT TRAINING: CPT | Mod: GO

## 2023-01-31 PROCEDURE — 250N000011 HC RX IP 250 OP 636: Performed by: INTERNAL MEDICINE

## 2023-01-31 PROCEDURE — 86140 C-REACTIVE PROTEIN: CPT | Performed by: INTERNAL MEDICINE

## 2023-01-31 PROCEDURE — 84132 ASSAY OF SERUM POTASSIUM: CPT | Performed by: INTERNAL MEDICINE

## 2023-01-31 PROCEDURE — 36415 COLL VENOUS BLD VENIPUNCTURE: CPT | Performed by: INTERNAL MEDICINE

## 2023-01-31 RX ORDER — POTASSIUM CHLORIDE 1.5 G/1.58G
40 POWDER, FOR SOLUTION ORAL ONCE
Status: COMPLETED | OUTPATIENT
Start: 2023-01-31 | End: 2023-01-31

## 2023-01-31 RX ADMIN — GABAPENTIN 300 MG: 300 CAPSULE ORAL at 21:36

## 2023-01-31 RX ADMIN — AMLODIPINE BESYLATE 10 MG: 10 TABLET ORAL at 10:21

## 2023-01-31 RX ADMIN — GABAPENTIN 300 MG: 300 CAPSULE ORAL at 10:14

## 2023-01-31 RX ADMIN — SERTRALINE HYDROCHLORIDE 100 MG: 100 TABLET ORAL at 10:15

## 2023-01-31 RX ADMIN — GABAPENTIN 300 MG: 300 CAPSULE ORAL at 16:48

## 2023-01-31 RX ADMIN — ACETAMINOPHEN 650 MG: 325 TABLET, FILM COATED ORAL at 16:49

## 2023-01-31 RX ADMIN — HYDROXYZINE HYDROCHLORIDE 10 MG: 10 TABLET ORAL at 21:36

## 2023-01-31 RX ADMIN — POTASSIUM CHLORIDE 40 MEQ: 1.5 POWDER, FOR SOLUTION ORAL at 13:55

## 2023-01-31 RX ADMIN — PANTOPRAZOLE SODIUM 40 MG: 40 TABLET, DELAYED RELEASE ORAL at 10:15

## 2023-01-31 RX ADMIN — ENOXAPARIN SODIUM 40 MG: 40 INJECTION SUBCUTANEOUS at 13:51

## 2023-01-31 RX ADMIN — ISOSORBIDE DINITRATE 10 MG: 10 TABLET ORAL at 21:36

## 2023-01-31 RX ADMIN — SENNOSIDES AND DOCUSATE SODIUM 1 TABLET: 50; 8.6 TABLET ORAL at 21:36

## 2023-01-31 RX ADMIN — LISINOPRIL 30 MG: 20 TABLET ORAL at 10:14

## 2023-01-31 RX ADMIN — HYDROXYZINE HYDROCHLORIDE 10 MG: 10 TABLET ORAL at 10:14

## 2023-01-31 RX ADMIN — INSULIN ASPART 1 UNITS: 100 INJECTION, SOLUTION INTRAVENOUS; SUBCUTANEOUS at 16:56

## 2023-01-31 RX ADMIN — CARVEDILOL 25 MG: 25 TABLET, FILM COATED ORAL at 21:35

## 2023-01-31 RX ADMIN — MICONAZOLE NITRATE: 20 POWDER TOPICAL at 10:17

## 2023-01-31 RX ADMIN — ISOSORBIDE DINITRATE 10 MG: 10 TABLET ORAL at 16:49

## 2023-01-31 RX ADMIN — ACETAMINOPHEN 650 MG: 325 TABLET, FILM COATED ORAL at 21:36

## 2023-01-31 RX ADMIN — ACETAMINOPHEN 650 MG: 325 TABLET, FILM COATED ORAL at 10:09

## 2023-01-31 RX ADMIN — Medication 250 MG: at 10:14

## 2023-01-31 RX ADMIN — CLONIDINE HYDROCHLORIDE 0.1 MG: 0.1 TABLET ORAL at 21:36

## 2023-01-31 RX ADMIN — CARVEDILOL 12.5 MG: 12.5 TABLET, FILM COATED ORAL at 10:14

## 2023-01-31 RX ADMIN — MICONAZOLE NITRATE: 20 POWDER TOPICAL at 21:41

## 2023-01-31 RX ADMIN — ISOSORBIDE DINITRATE 10 MG: 10 TABLET ORAL at 10:15

## 2023-01-31 RX ADMIN — DEXAMETHASONE 6 MG: 2 TABLET ORAL at 10:14

## 2023-01-31 ASSESSMENT — ACTIVITIES OF DAILY LIVING (ADL)
ADLS_ACUITY_SCORE: 53
ADLS_ACUITY_SCORE: 52
ADLS_ACUITY_SCORE: 55
ADLS_ACUITY_SCORE: 55
ADLS_ACUITY_SCORE: 53
ADLS_ACUITY_SCORE: 55

## 2023-01-31 NOTE — PLAN OF CARE
For vital signs and complete assessments, please see documentation flowsheets.     Pertinent assessments: Pt. AxO self. Pt. Complaining of back pain. scheduled tylenol effective. Afebrile, LS clear/dim on 1L O2. Incontinent with purewick;  Ax2 with pooja steady. Takes pill 1 at a time with chocolate pudding.     Major Shift Events: none    Treatment Plan: Monitor respiratory status, Lovenox. Discharge to TCU TBD.    Bedside Nurse: Ever Peralta RN

## 2023-01-31 NOTE — PLAN OF CARE
Goal Outcome Evaluation:    Pertinent assessments: Assumed cares [id number]. Pt lethargic, confused. Denies pain or SOB. Afebrile, /60. LS clear/dim on 2L O2. Incontinent with purewick; cares given with assist of 1. At times refusing repositioning. Did not get up this shift. Slept well.

## 2023-01-31 NOTE — PROGRESS NOTES
[facility]    Medicine Progress Note - Hospitalist Service    Date of Admission:  1/23/2023    Assessment & Plan      Daya Ignacio is a 89 year old female with known prior history of hypertension, prior C. difficile colitis, chronic pain on chronic narcotics, multiple compression fractures in the past, ESBL UTI, anxiety, depression, cognitive impairment and reportedly lives in an assisted living facility set up and unfortunately has to presents in the emergency room by emergency personnel earlier after she was found having jerking-like sensation and concerns for aspiration while partaking her dinner food tray.  During EMS evaluation she was found to be hypoxic at 61% via room air but were able to increase to close to 90% utilizing 15 L of oxygen in route here    Afternoon of 1/24/2023 she had worsening mental status, which culminated in vomiting.  Further work-up is being pursued.    Problem list:  Principal Problem:    Aspiration pneumonitis (H)  Possible aspiration pneumonia  Worsening leukocytosis  Urinary tract infection with isolated sensitive E. Coli  Prior history of ESBL urinary tract infection.   Acute hypoxic respiratory failure likely combination of possible aspiration pneumonia now with COVID-19 infection and pneumonia    Active Problems:    Hypertension    Acute respiratory failure with hypoxia (H)  -Infectious encephalopathy-improving    -No further fever spike   -Subsequent procalcitonin low risk   -COVID-19 screen and influenza screen pursued due to fever spike last 1/26/2023   -Acute COVID-19 infection with possible underlying COVID-19 pneumonia with hypoxia   -Started on remdesivir and Decadron last 1/26/2023   -Finished remdesivir  -Elevated CRP and will be continued to be monitored   -CRP continued to show downward trend with most recent level at 14  -Leukocytosis resolved  -Continue to wean and titrate oxygen as able currently at 1-2 L by nasal cannula  -Able to  discontinue oxygen support this morning  -Change breathing treatment to as needed basis  -Finish oral antibiotics course for isolated sensitive E. coli with UTI.   -will also cover for possibility of aspiration pneumonitis and pneumonia earlier  -Done with antibiotics       -COVID-19 isolation precautions    -Blood cultures remain no growth up-to-date  -Now with increasing blood pressure levels, will resume all of her oral home regimen antihypertensive  -Earlier showing alteration in mental state.  Received 2 doses of 25 mg Seroquel  -Seroquel discontinued as patient became very sedated and lethargic.  Continue to hold any further anxiolytics    Altered mental status, etiology unclear at this time.  Differential includes delirium, postictal state-resolved  -Infectious encephalopathy  -No obvious seizure-like activity  -Reassuring CT of the head      Chronic pain on chronic narcotics  -I reviewed her electronic medical records, home regimen and she is able to tolerate narcotics and opioids in the past.  -Endorsing no worsening pain issues.  I discontinued oral hydromorphone and IV hydromorphone and continued on her home regimen of hydrocodone given worsening mental status changes.    Physical deconditioning  -Appreciate input from PT, OT likely will be needing TCU placement    Stable acute on chronic anemia likely of chronic illness  -Most likely worsening anemia from frequent blood draws  -No bleeding tendencies here  -Does not need packed RBC transfusion yet    Will need social service evaluation as well for discharge planning disposition         Diet: Combination Diet Easy to Chew (level 7); Thin Liquids (level 0)  Snacks/Supplements Adult: Ensure Enlive; With Meals    DVT Prophylaxis: Pneumatic Compression Devices  Almanza Catheter: Not present  Lines: None     Cardiac Monitoring: None  Code Status: No CPR- Do NOT Intubate      Clinically Significant Risk Factors        # Hypokalemia: Lowest K = 3.3 mmol/L in last  2 days, will replace as needed                         Disposition Plan     Expected Discharge Date: Currently taken off from oxygen.  Anticipating she will be a good candidate for hospital discharge in the next 24 to 36 hours.  Destination: Anticipating TCU placement versus back to prior living arrangements             Prakash Byrd MD, MD  Hospitalist Service  [facility]  Securely message with [employer] (more info)  Text page via [employer] Paging/Directory   ______________________________________________________________________    Interval History   Continuing service care today.  Seen and examined.  Chart reviewed.  Case discussed with nursing service.    I met this very pleasant lady while she is laying comfortably in bed.  Case discussed with nursing service was gracious to be present at bedside during my encounter.  No significant reported events overnight.  Mentation at baseline.  She feels significantly improved.  Not a whole lot of oral intake but able to tolerate part of it.  No nausea or vomiting.  -Was just taken off from oxygen support this morning  Remained afebrile.  -No reported diarrhea.            Physical Exam   Vital Signs: Temp: 98.7  F (37.1  C) Temp src: Oral BP: (!) 177/66 Pulse: 60   Resp: 18 SpO2: 99 % O2 Device: Nasal cannula Oxygen Delivery: 2 LPM  Weight: 127 lbs 9.6 oz    HEENT; Atraumatic, normocephalic, pinkish conjuctiva, pupils bilateral reactive   Skin: warm and moist, no rashes  Lungs: Fair air entry, earlier wheezing has improved  Heart: normal rate, normal rhythm, no rubs or gallops.   Abdomen: normal bowel sounds, no tenderness, no peritoneal signs, no guarding  Extremities: no deformities, no edema   Neuro; she is awake, alert, oriented to her name and place, following some simple verbal instructions, extremely hard of hearing, moves all extremities spontaneously  Psych; she is not combative nor agitated        Medical Decision Making       50 MINUTES  SPENT BY ME on the date of service doing chart review, history, exam, documentation & further activities per the note.  MANAGEMENT DISCUSSED with the following over the past 24 hours: yes   NOTE(S)/MEDICAL RECORDS REVIEWED over the past 24 hours: yes  Tests ORDERED & REVIEWED in the past 24 hours:      Data     I have personally reviewed the following data over the past 24 hrs:    Procal: N/A CRP: 13.74 (H) Lactic Acid: N/A         Imaging results reviewed over the past 24 hrs:   No results found for this or any previous visit (from the past 24 hour(s)).

## 2023-02-01 ENCOUNTER — APPOINTMENT (OUTPATIENT)
Dept: PHYSICAL THERAPY | Facility: CLINIC | Age: 88
DRG: 177 | End: 2023-02-01
Payer: COMMERCIAL

## 2023-02-01 LAB
GLUCOSE BLDC GLUCOMTR-MCNC: 131 MG/DL (ref 70–99)
GLUCOSE BLDC GLUCOMTR-MCNC: 156 MG/DL (ref 70–99)
GLUCOSE BLDC GLUCOMTR-MCNC: 186 MG/DL (ref 70–99)
GLUCOSE BLDC GLUCOMTR-MCNC: 213 MG/DL (ref 70–99)
GLUCOSE BLDC GLUCOMTR-MCNC: 79 MG/DL (ref 70–99)
HOLD SPECIMEN: NORMAL
POTASSIUM SERPL-SCNC: 3.9 MMOL/L (ref 3.4–5.3)

## 2023-02-01 PROCEDURE — 99207 PR NO BILLABLE SERVICE THIS VISIT: CPT | Performed by: HOSPITALIST

## 2023-02-01 PROCEDURE — 250N000013 HC RX MED GY IP 250 OP 250 PS 637: Performed by: INTERNAL MEDICINE

## 2023-02-01 PROCEDURE — 84132 ASSAY OF SERUM POTASSIUM: CPT | Performed by: HOSPITALIST

## 2023-02-01 PROCEDURE — 99232 SBSQ HOSP IP/OBS MODERATE 35: CPT | Performed by: HOSPITALIST

## 2023-02-01 PROCEDURE — 36415 COLL VENOUS BLD VENIPUNCTURE: CPT | Performed by: HOSPITALIST

## 2023-02-01 PROCEDURE — 250N000013 HC RX MED GY IP 250 OP 250 PS 637: Performed by: HOSPITALIST

## 2023-02-01 PROCEDURE — 120N000001 HC R&B MED SURG/OB

## 2023-02-01 PROCEDURE — 250N000012 HC RX MED GY IP 250 OP 636 PS 637: Performed by: INTERNAL MEDICINE

## 2023-02-01 PROCEDURE — 250N000011 HC RX IP 250 OP 636: Performed by: INTERNAL MEDICINE

## 2023-02-01 PROCEDURE — 97530 THERAPEUTIC ACTIVITIES: CPT | Mod: GP | Performed by: PHYSICAL THERAPIST

## 2023-02-01 PROCEDURE — 250N000013 HC RX MED GY IP 250 OP 250 PS 637: Performed by: FAMILY MEDICINE

## 2023-02-01 RX ORDER — CLONIDINE HYDROCHLORIDE 0.1 MG/1
0.2 TABLET ORAL AT BEDTIME
Status: DISCONTINUED | OUTPATIENT
Start: 2023-02-01 | End: 2023-02-06

## 2023-02-01 RX ADMIN — GABAPENTIN 300 MG: 300 CAPSULE ORAL at 10:08

## 2023-02-01 RX ADMIN — PANTOPRAZOLE SODIUM 40 MG: 40 TABLET, DELAYED RELEASE ORAL at 10:06

## 2023-02-01 RX ADMIN — HYDROXYZINE HYDROCHLORIDE 10 MG: 10 TABLET ORAL at 10:08

## 2023-02-01 RX ADMIN — ISOSORBIDE DINITRATE 10 MG: 10 TABLET ORAL at 20:34

## 2023-02-01 RX ADMIN — ACETAMINOPHEN 650 MG: 325 TABLET, FILM COATED ORAL at 20:34

## 2023-02-01 RX ADMIN — CLONIDINE HYDROCHLORIDE 0.2 MG: 0.1 TABLET ORAL at 20:34

## 2023-02-01 RX ADMIN — AMLODIPINE BESYLATE 10 MG: 10 TABLET ORAL at 10:08

## 2023-02-01 RX ADMIN — DEXAMETHASONE 6 MG: 2 TABLET ORAL at 10:06

## 2023-02-01 RX ADMIN — ISOSORBIDE DINITRATE 10 MG: 10 TABLET ORAL at 10:08

## 2023-02-01 RX ADMIN — INSULIN ASPART 1 UNITS: 100 INJECTION, SOLUTION INTRAVENOUS; SUBCUTANEOUS at 18:28

## 2023-02-01 RX ADMIN — ACETAMINOPHEN 650 MG: 325 TABLET, FILM COATED ORAL at 10:08

## 2023-02-01 RX ADMIN — CARVEDILOL 25 MG: 25 TABLET, FILM COATED ORAL at 20:35

## 2023-02-01 RX ADMIN — HYDROCODONE BITARTRATE AND ACETAMINOPHEN 1 TABLET: 5; 325 TABLET ORAL at 00:22

## 2023-02-01 RX ADMIN — ISOSORBIDE DINITRATE 10 MG: 10 TABLET ORAL at 17:24

## 2023-02-01 RX ADMIN — MICONAZOLE NITRATE: 20 POWDER TOPICAL at 10:13

## 2023-02-01 RX ADMIN — CARVEDILOL 12.5 MG: 12.5 TABLET, FILM COATED ORAL at 10:08

## 2023-02-01 RX ADMIN — ENOXAPARIN SODIUM 40 MG: 40 INJECTION SUBCUTANEOUS at 14:08

## 2023-02-01 RX ADMIN — HYDROXYZINE HYDROCHLORIDE 10 MG: 10 TABLET ORAL at 20:33

## 2023-02-01 RX ADMIN — GABAPENTIN 300 MG: 300 CAPSULE ORAL at 17:24

## 2023-02-01 RX ADMIN — Medication 250 MG: at 10:06

## 2023-02-01 RX ADMIN — LISINOPRIL 30 MG: 20 TABLET ORAL at 10:08

## 2023-02-01 RX ADMIN — INSULIN ASPART 1 UNITS: 100 INJECTION, SOLUTION INTRAVENOUS; SUBCUTANEOUS at 14:09

## 2023-02-01 RX ADMIN — HYDROCODONE BITARTRATE AND ACETAMINOPHEN 1 TABLET: 5; 325 TABLET ORAL at 18:27

## 2023-02-01 RX ADMIN — MICONAZOLE NITRATE: 20 POWDER TOPICAL at 20:33

## 2023-02-01 RX ADMIN — GABAPENTIN 300 MG: 300 CAPSULE ORAL at 20:34

## 2023-02-01 RX ADMIN — ACETAMINOPHEN 650 MG: 325 TABLET, FILM COATED ORAL at 17:24

## 2023-02-01 RX ADMIN — SERTRALINE HYDROCHLORIDE 100 MG: 100 TABLET ORAL at 10:08

## 2023-02-01 RX ADMIN — SENNOSIDES AND DOCUSATE SODIUM 1 TABLET: 50; 8.6 TABLET ORAL at 20:34

## 2023-02-01 ASSESSMENT — ACTIVITIES OF DAILY LIVING (ADL)
ADLS_ACUITY_SCORE: 55
ADLS_ACUITY_SCORE: 55
ADLS_ACUITY_SCORE: 56
ADLS_ACUITY_SCORE: 55
ADLS_ACUITY_SCORE: 56
ADLS_ACUITY_SCORE: 55
ADLS_ACUITY_SCORE: 51
ADLS_ACUITY_SCORE: 55
ADLS_ACUITY_SCORE: 56
ADLS_ACUITY_SCORE: 55

## 2023-02-01 NOTE — PROGRESS NOTES
[facility]    Hospitalist Progress Note             Date of Admission:  1/23/2023                   Day of hospitalization: 9    Assessment and Plan:   Daya Ignacio is a 89 year old female with known prior history of hypertension, prior C. difficile colitis, chronic pain on chronic narcotics, multiple compression fractures in the past, ESBL UTI, anxiety, depression, cognitive impairment and reportedly lives in an assisted living facility set up and unfortunately has to presents in the emergency room by emergency personnel earlier after she was found having jerking-like sensation and concerns for aspiration while partaking her dinner food tray.  During EMS evaluation she was found to be hypoxic at 61% via room air but were able to increase to close to 90% utilizing 15 L of oxygen in route here     Afternoon of 1/24/2023 she had worsening mental status, which culminated in vomiting.  Further work-up is being pursued.       Acute respiratory failure with hypoxia (H)  Acute COVID-19 infection with possible underlying COVID-19 pneumonia with hypoxia   -Subsequent procalcitonin low risk   -COVID-19 screen and influenza screen pursued due to fever spike last 1/26/2023   -Started on remdesivir and Decadron last 1/26/2023   -Finished remdesivir  -Leukocytosis resolved  -Continue to wean and titrate oxygen on Room air  -Finish oral antibiotics course for isolated sensitive E. coli with UTI.   -Done with antibiotics   -COVID-19 isolation precautions    -Blood cultures remain no growth up-to-date    Infectious encephalopathy-improving  -Earlier showing alteration in mental state.  Received 2 doses of 25 mg Seroquel  - Seroquel discontinued as patient became very sedated and lethargic.  Continue to hold any further anxiolytics  - No obvious seizure-like activity  - Reassuring CT of the head    HTN  -Now with increasing blood pressure levels, will resume all of her oral home regimen antihypertensive  - on  "Clonidine .2 mg at bedtime (increased today), coreg 12.5 in AM, 25 mg in PM, isosorbide dinatrate 10 mg TID, Lisinopril 30 mg    Chronic pain on chronic narcotics  -I reviewed her electronic medical records, home regimen and she is able to tolerate narcotics and opioids in the past.  -Endorsing no worsening pain issues.  I discontinued oral hydromorphone and IV hydromorphone and continued on her home regimen of hydrocodone given worsening mental status changes.     Physical deconditioning  -Appreciate input from PT, OT likely will be needing TCU placement     Stable acute on chronic anemia likely of chronic illness  -Most likely worsening anemia from frequent blood draws  -No bleeding tendencies here  -Does not need packed RBC transfusion yet        ---------     # Code status: DNR/DNI  # Anticipated discharge date and Disposition: needs TCU  # DVT: Lovenox  # IVF: None                       Freya Muir MD  Text Page (7am - 6pm, M-F)               Subjective   Chief Complaint:  Weakness  Subjective: No complaints feels well no shortness of breath no chest pain nausea vomiting abdominal pain is bored in the hospital.        Objective   BP (!) 168/62 (BP Location: Right arm)   Pulse 60   Temp 97.6  F (36.4  C) (Oral)   Resp 18   Ht 1.549 m (5' 1\")   Wt 57.9 kg (127 lb 9.6 oz)   SpO2 94%   BMI 24.11 kg/m       Physical Exam  General: Pt in NAD, normal appearance  HEENT: OP clear MMM, no JVD  Lungs: Clear to Auscultation Bilateral, normal breathing  without accessory muscle usage, no wheezing, rhonchi or crackles  Cardiac: +S1, S2, RRR, no MRG, no edema  Abdominal: normal bowel sounds, NT/ND, no hepatosplenomegaly  Skin: warm, dry, normal turgor, no rash  Psyche: A& O x3, appropriate affect             Intake/Output Summary (Last 24 hours) at 2/1/2023 1142  Last data filed at 2/1/2023 1113  Gross per 24 hour   Intake 480 ml   Output 1050 ml   Net -570 ml           Labs and Imaging Results:      Recent Labs "   Lab 01/29/23  0724 01/28/23  0704   WBC 5.4 5.5   HGB 7.8* 7.6*    169        Recent Labs   Lab 01/30/23  0748 01/29/23  0724    140   CO2 27 26   BUN 13.1 16.3      No results for input(s): INR, PTT in the last 168 hours.   No results for input(s): CKMB in the last 168 hours.    Invalid input(s): TROPONINT   No results for input(s): ALBUMIN, AST, ALT, ALKPHOS, BILITOT in the last 168 hours.     Micro:     Radio:  XR Chest Port 1 View   Final Result   IMPRESSION: Stable mild bibasilar retrocardiac atelectasis/pneumonia.   No pleural effusion or pneumothorax. Normal heart size.      HATTIE DEVINE MD            SYSTEM ID:  [id number]      CT Head w/o Contrast   Final Result   IMPRESSION:   1.  No CT evidence for acute intracranial process.   2.  Brain atrophy and presumed chronic microvascular ischemic changes, mildly progressed since 2019.      XR Video Swallow with SLP or OT   Final Result   IMPRESSION:   1. Penetration with thin liquids. No aspiration identified.   2. Please refer to the speech pathology report for further details.      This study includes only the cervical esophagus.      DINORAH SO MD            SYSTEM ID:  [id number]      XR Chest Port 1 View   Final Result   IMPRESSION: Fine linear opacities radiating from the elham into the periphery of both lungs have developed consistent with mild interstitial edema. Heart size and pulmonary vascularity upper normal. Bones are demineralized.              Medications:      Scheduled Meds:      acetaminophen  650 mg Oral TID     amLODIPine  10 mg Oral Daily     carvedilol  12.5 mg Oral QAM     carvedilol  25 mg Oral At Bedtime     cloNIDine  0.2 mg Oral At Bedtime     dexamethasone  6 mg Oral Daily     enoxaparin ANTICOAGULANT  40 mg Subcutaneous Q24H     gabapentin  300 mg Oral TID     hydrOXYzine  10 mg Oral BID     insulin aspart  1-3 Units Subcutaneous TID AC     isosorbide dinitrate  10 mg Oral TID     lisinopril  30 mg Oral Daily      miconazole   Topical BID     pantoprazole  40 mg Oral QAM AC     saccharomyces boulardii  250 mg Oral Daily     senna-docusate  1 tablet Oral At Bedtime     sertraline  100 mg Oral Daily     sodium chloride (PF)  3 mL Intracatheter Q8H     Continuous Infusions:    PRN Meds:  albuterol, glucose **OR** dextrose **OR** glucagon, hydrALAZINE, HYDROcodone-acetaminophen, ipratropium - albuterol 0.5 mg/2.5 mg/3 mL, lidocaine 4%, lidocaine (buffered or not buffered), melatonin, naloxone **OR** naloxone **OR** naloxone **OR** naloxone, ondansetron **OR** ondansetron, polyethylene glycol, senna-docusate **OR** senna-docusate, sodium chloride (PF)

## 2023-02-01 NOTE — PLAN OF CARE
Goal Outcome Evaluation:       End of Shift Summary  For vital signs and complete assessments, please see documentation flowsheets.     Pertinent assessments: Pt alert to self. LS dim and no sob noted. Tolerated diet and no nausea noted. Had some back pain and tylenol given with relief. Had  some infrequent non-productive cough. K recheck WNL .Incontinent pads changed.A2 sera -steady.    Major Shift Events: None    Treatment Plan: Pain management, lovenox, decadron, monitor symptoms.

## 2023-02-01 NOTE — PLAN OF CARE
Goal Outcome Evaluation:      Pertinent assessments: Assumed cares [id number]. Pt alert/lethargic, confused. C/o back pain, and was given prn Nesmith with improvement. Patient also on scheduled Tylenol, but Tylenol dosage less than 4g/day. Denies SOB. Afebrile, BP soft 105/34, and recheck 170s/80s (probably after being moved in bed).  LS clear/dim, and weaned to room air. Sats 93%. Incontinent with purewick; cares given with assist of 1. At times refusing repositioning. Did not get up this shift. Slept well.

## 2023-02-01 NOTE — PLAN OF CARE
Goal Outcome Evaluation:    Pertinent assessments: A&O4, but forgetful. BS well maintained. VSS. Dry cough noted, LSC, 94% on RA. Denied pain. Purewick removed at this time, good output this shift.     Major Shift Events: PT worked with pt, now considered an assist of 1.     Treatment Plan: Cont to encourage getting out of bed, and freq repositioning.

## 2023-02-02 ENCOUNTER — APPOINTMENT (OUTPATIENT)
Dept: OCCUPATIONAL THERAPY | Facility: CLINIC | Age: 88
DRG: 177 | End: 2023-02-02
Payer: COMMERCIAL

## 2023-02-02 ENCOUNTER — APPOINTMENT (OUTPATIENT)
Dept: SPEECH THERAPY | Facility: CLINIC | Age: 88
DRG: 177 | End: 2023-02-02
Payer: COMMERCIAL

## 2023-02-02 LAB
GLUCOSE BLDC GLUCOMTR-MCNC: 104 MG/DL (ref 70–99)
GLUCOSE BLDC GLUCOMTR-MCNC: 107 MG/DL (ref 70–99)
GLUCOSE BLDC GLUCOMTR-MCNC: 154 MG/DL (ref 70–99)
GLUCOSE BLDC GLUCOMTR-MCNC: 169 MG/DL (ref 70–99)
GLUCOSE BLDC GLUCOMTR-MCNC: 177 MG/DL (ref 70–99)
POTASSIUM SERPL-SCNC: 4.6 MMOL/L (ref 3.4–5.3)

## 2023-02-02 PROCEDURE — 92526 ORAL FUNCTION THERAPY: CPT | Mod: GN

## 2023-02-02 PROCEDURE — 250N000013 HC RX MED GY IP 250 OP 250 PS 637: Performed by: INTERNAL MEDICINE

## 2023-02-02 PROCEDURE — 120N000001 HC R&B MED SURG/OB

## 2023-02-02 PROCEDURE — 250N000013 HC RX MED GY IP 250 OP 250 PS 637: Performed by: FAMILY MEDICINE

## 2023-02-02 PROCEDURE — 97535 SELF CARE MNGMENT TRAINING: CPT | Mod: GO

## 2023-02-02 PROCEDURE — 250N000012 HC RX MED GY IP 250 OP 636 PS 637: Performed by: INTERNAL MEDICINE

## 2023-02-02 PROCEDURE — 97110 THERAPEUTIC EXERCISES: CPT | Mod: GO

## 2023-02-02 PROCEDURE — 99232 SBSQ HOSP IP/OBS MODERATE 35: CPT | Performed by: HOSPITALIST

## 2023-02-02 PROCEDURE — 250N000011 HC RX IP 250 OP 636: Performed by: INTERNAL MEDICINE

## 2023-02-02 PROCEDURE — 84132 ASSAY OF SERUM POTASSIUM: CPT | Performed by: HOSPITALIST

## 2023-02-02 PROCEDURE — 250N000013 HC RX MED GY IP 250 OP 250 PS 637: Performed by: HOSPITALIST

## 2023-02-02 PROCEDURE — 36415 COLL VENOUS BLD VENIPUNCTURE: CPT | Performed by: HOSPITALIST

## 2023-02-02 RX ORDER — HYDROXYZINE HYDROCHLORIDE 10 MG/1
5 TABLET, FILM COATED ORAL 2 TIMES DAILY
Status: DISCONTINUED | OUTPATIENT
Start: 2023-02-02 | End: 2023-02-10 | Stop reason: HOSPADM

## 2023-02-02 RX ORDER — CLONIDINE HYDROCHLORIDE 0.1 MG/1
0.2 TABLET ORAL AT BEDTIME
Qty: 60 TABLET | Refills: 0
Start: 2023-02-02 | End: 2024-05-02

## 2023-02-02 RX ORDER — DEXAMETHASONE 6 MG/1
6 TABLET ORAL DAILY
Qty: 4 TABLET | Refills: 0 | Status: SHIPPED | OUTPATIENT
Start: 2023-02-02 | End: 2023-02-10

## 2023-02-02 RX ORDER — HYDROXYZINE HYDROCHLORIDE 10 MG/1
10 TABLET, FILM COATED ORAL 2 TIMES DAILY
Status: DISCONTINUED | OUTPATIENT
Start: 2023-02-02 | End: 2023-02-02

## 2023-02-02 RX ADMIN — GABAPENTIN 300 MG: 300 CAPSULE ORAL at 10:28

## 2023-02-02 RX ADMIN — CARVEDILOL 12.5 MG: 12.5 TABLET, FILM COATED ORAL at 10:28

## 2023-02-02 RX ADMIN — DEXAMETHASONE 6 MG: 2 TABLET ORAL at 10:30

## 2023-02-02 RX ADMIN — MICONAZOLE NITRATE: 20 POWDER TOPICAL at 10:34

## 2023-02-02 RX ADMIN — HYDROCODONE BITARTRATE AND ACETAMINOPHEN 1 TABLET: 5; 325 TABLET ORAL at 02:02

## 2023-02-02 RX ADMIN — HYDROXYZINE HYDROCHLORIDE 10 MG: 10 TABLET ORAL at 10:28

## 2023-02-02 RX ADMIN — ISOSORBIDE DINITRATE 10 MG: 10 TABLET ORAL at 18:13

## 2023-02-02 RX ADMIN — CARVEDILOL 25 MG: 25 TABLET, FILM COATED ORAL at 21:43

## 2023-02-02 RX ADMIN — SERTRALINE HYDROCHLORIDE 100 MG: 100 TABLET ORAL at 10:27

## 2023-02-02 RX ADMIN — Medication 250 MG: at 10:29

## 2023-02-02 RX ADMIN — LISINOPRIL 30 MG: 20 TABLET ORAL at 10:29

## 2023-02-02 RX ADMIN — GABAPENTIN 300 MG: 300 CAPSULE ORAL at 18:13

## 2023-02-02 RX ADMIN — HYDROXYZINE HYDROCHLORIDE 5 MG: 10 TABLET ORAL at 21:43

## 2023-02-02 RX ADMIN — ISOSORBIDE DINITRATE 10 MG: 10 TABLET ORAL at 10:28

## 2023-02-02 RX ADMIN — ACETAMINOPHEN 650 MG: 325 TABLET, FILM COATED ORAL at 21:43

## 2023-02-02 RX ADMIN — ACETAMINOPHEN 650 MG: 325 TABLET, FILM COATED ORAL at 10:27

## 2023-02-02 RX ADMIN — ACETAMINOPHEN 650 MG: 325 TABLET, FILM COATED ORAL at 18:13

## 2023-02-02 RX ADMIN — ENOXAPARIN SODIUM 40 MG: 40 INJECTION SUBCUTANEOUS at 13:50

## 2023-02-02 RX ADMIN — ISOSORBIDE DINITRATE 10 MG: 10 TABLET ORAL at 21:43

## 2023-02-02 RX ADMIN — PANTOPRAZOLE SODIUM 40 MG: 40 TABLET, DELAYED RELEASE ORAL at 10:29

## 2023-02-02 RX ADMIN — INSULIN ASPART 1 UNITS: 100 INJECTION, SOLUTION INTRAVENOUS; SUBCUTANEOUS at 18:14

## 2023-02-02 RX ADMIN — CLONIDINE HYDROCHLORIDE 0.2 MG: 0.1 TABLET ORAL at 21:43

## 2023-02-02 RX ADMIN — GABAPENTIN 300 MG: 300 CAPSULE ORAL at 21:43

## 2023-02-02 RX ADMIN — AMLODIPINE BESYLATE 10 MG: 10 TABLET ORAL at 10:30

## 2023-02-02 ASSESSMENT — ACTIVITIES OF DAILY LIVING (ADL)
ADLS_ACUITY_SCORE: 53
ADLS_ACUITY_SCORE: 53
ADLS_ACUITY_SCORE: 49
ADLS_ACUITY_SCORE: 53
ADLS_ACUITY_SCORE: 53
ADLS_ACUITY_SCORE: 49
ADLS_ACUITY_SCORE: 53
ADLS_ACUITY_SCORE: 53
ADLS_ACUITY_SCORE: 51
ADLS_ACUITY_SCORE: 53

## 2023-02-02 NOTE — PROGRESS NOTES
Care Management Follow Up    Length of Stay (days): 10    Expected Discharge Date: 02/03/2023     Concerns to be Addressed:       Patient plan of care discussed at interdisciplinary rounds: Yes    Anticipated Discharge Disposition: Transitional Care vs return to [facility]     Anticipated Discharge Services:    Anticipated Discharge DME:      Patient/family educated on Medicare website which has current facility and service quality ratings:    Education Provided on the Discharge Plan:    Patient/Family in Agreement with the Plan: yes    Private pay costs discussed: Not applicable    Additional Information:  Unable to connect with [facility] today. Need to discuss the potential return to facility with current level of care and mobility. If able to return may need to add on PT/OT services. PT recommendations are for return to facility if adequate support and OT is recommending TCU. If TCU would need to stay until COVID recovered 2/6.  Left message for patients jose Mayo with updates.    Clarice Morgan RN BSN OCN  Care Coordinator  [facility]  [phone]

## 2023-02-02 NOTE — PROGRESS NOTES
[facility]    Hospitalist Progress Note             Date of Admission:  1/23/2023                   Day of hospitalization: 10    Assessment and Plan:   Daya Ignacio is a 89 year old female with known prior history of hypertension, prior C. difficile colitis, chronic pain on chronic narcotics, multiple compression fractures in the past, ESBL UTI, anxiety, depression, cognitive impairment and reportedly lives in an assisted living facility set up and unfortunately has to presents in the emergency room by emergency personnel earlier after she was found having jerking-like sensation and concerns for aspiration while partaking her dinner food tray.  During EMS evaluation she was found to be hypoxic at 61% via room air but were able to increase to close to 90% utilizing 15 L of oxygen in route here     Afternoon of 1/24/2023 she had worsening mental status, which culminated in vomiting.  Further work-up is being pursued.       Acute respiratory failure with hypoxia (H)  Acute COVID-19 infection with possible underlying COVID-19 pneumonia with hypoxia   -Subsequent procalcitonin low risk   -COVID-19 screen and influenza screen pursued due to fever spike last 1/26/2023   -Started on remdesivir and Decadron last 1/26/2023   -Finished remdesivir  -Leukocytosis resolved  -Continue to wean and titrate oxygen on Room air  -Finish oral antibiotics course for isolated sensitive E. coli with UTI.   -Done with antibiotics   -COVID-19 isolation precautions    -Blood cultures remain no growth up-to-date    Infectious encephalopathy-improving  -Earlier showing alteration in mental state.  Received 2 doses of 25 mg Seroquel  - Seroquel discontinued as patient became very sedated and lethargic.  Continue to hold any further anxiolytics  - No obvious seizure-like activity  - Reassuring CT of the head    HTN  -Now with increasing blood pressure levels, will resume all of her oral home regimen antihypertensive  -  "on Clonidine .2 mg at bedtime (increased today), coreg 12.5 in AM, 25 mg in PM, isosorbide dinatrate 10 mg TID, Lisinopril 30 mg    Chronic pain on chronic narcotics  -I reviewed her electronic medical records, home regimen and she is able to tolerate narcotics and opioids in the past.  -Endorsing no worsening pain issues.  I discontinued oral hydromorphone and IV hydromorphone and continued on her home regimen of hydrocodone given worsening mental status changes.     Physical deconditioning  -Appreciate input from PT, OT likely will be needing TCU placement     Stable acute on chronic anemia likely of chronic illness  -Most likely worsening anemia from frequent blood draws  -No bleeding tendencies here  -Does not need packed RBC transfusion yet        ---------     # Code status: DNR/DNI  # Anticipated discharge date and Disposition: needs TCU, medically ready, discussed with social work  # DVT: Lovenox  # IVF: None                       Freya Muir MD  Text Page (7am - 6pm, M-F)               Subjective   Chief Complaint:  Weakness  Subjective: No complaints feels well no shortness of breath no chest pain nausea vomiting abdominal pain.  patient states she is too weak to go home and does not feel comfortable being discharged home        Objective   BP (!) 156/57 (BP Location: Right arm)   Pulse 59   Temp 97.5  F (36.4  C) (Oral)   Resp 18   Ht 1.549 m (5' 1\")   Wt 57.9 kg (127 lb 9.6 oz)   SpO2 96%   BMI 24.11 kg/m       Physical Exam  General: Pt in NAD, normal appearance  HEENT: OP clear MMM, no JVD  Lungs: Clear to Auscultation Bilateral, normal breathing  without accessory muscle usage, no wheezing, rhonchi or crackles  Cardiac: +S1, S2, RRR, no MRG, no edema  Abdominal: normal bowel sounds, NT/ND, no hepatosplenomegaly  Skin: warm, dry, normal turgor, no rash  Psyche: A& O x3, appropriate affect             Intake/Output Summary (Last 24 hours) at 2/1/2023 1142  Last data filed at 2/1/2023 " 1113  Gross per 24 hour   Intake 480 ml   Output 1050 ml   Net -570 ml           Labs and Imaging Results:      Recent Labs   Lab 01/29/23  0724 01/28/23  0704   WBC 5.4 5.5   HGB 7.8* 7.6*    169        Recent Labs   Lab 01/30/23  0748 01/29/23  0724    140   CO2 27 26   BUN 13.1 16.3      No results for input(s): INR, PTT in the last 168 hours.   No results for input(s): CKMB in the last 168 hours.    Invalid input(s): TROPONINT   No results for input(s): ALBUMIN, AST, ALT, ALKPHOS, BILITOT in the last 168 hours.     Micro:     Radio:  XR Chest Port 1 View   Final Result   IMPRESSION: Stable mild bibasilar retrocardiac atelectasis/pneumonia.   No pleural effusion or pneumothorax. Normal heart size.      HATTIE DEVINE MD            SYSTEM ID:  [id number]      CT Head w/o Contrast   Final Result   IMPRESSION:   1.  No CT evidence for acute intracranial process.   2.  Brain atrophy and presumed chronic microvascular ischemic changes, mildly progressed since 2019.      XR Video Swallow with SLP or OT   Final Result   IMPRESSION:   1. Penetration with thin liquids. No aspiration identified.   2. Please refer to the speech pathology report for further details.      This study includes only the cervical esophagus.      DINORAH SO MD            SYSTEM ID:  [id number]      XR Chest Port 1 View   Final Result   IMPRESSION: Fine linear opacities radiating from the elham into the periphery of both lungs have developed consistent with mild interstitial edema. Heart size and pulmonary vascularity upper normal. Bones are demineralized.              Medications:      Scheduled Meds:      acetaminophen  650 mg Oral TID     amLODIPine  10 mg Oral Daily     carvedilol  12.5 mg Oral QAM     carvedilol  25 mg Oral At Bedtime     cloNIDine  0.2 mg Oral At Bedtime     dexamethasone  6 mg Oral Daily     enoxaparin ANTICOAGULANT  40 mg Subcutaneous Q24H     gabapentin  300 mg Oral TID     hydrOXYzine  10 mg Oral BID     insulin  aspart  1-3 Units Subcutaneous TID AC     isosorbide dinitrate  10 mg Oral TID     lisinopril  30 mg Oral Daily     miconazole   Topical BID     pantoprazole  40 mg Oral QAM AC     saccharomyces boulardii  250 mg Oral Daily     senna-docusate  1 tablet Oral At Bedtime     sertraline  100 mg Oral Daily     sodium chloride (PF)  3 mL Intracatheter Q8H     Continuous Infusions:    PRN Meds:  albuterol, glucose **OR** dextrose **OR** glucagon, hydrALAZINE, HYDROcodone-acetaminophen, ipratropium - albuterol 0.5 mg/2.5 mg/3 mL, lidocaine 4%, lidocaine (buffered or not buffered), melatonin, naloxone **OR** naloxone **OR** naloxone **OR** naloxone, ondansetron **OR** ondansetron, polyethylene glycol, senna-docusate **OR** senna-docusate, sodium chloride (PF)

## 2023-02-02 NOTE — PROGRESS NOTES
SPIRITUAL HEALTH SERVICES Progress Note  [facility] Med/Surg 5    Saw pt Daya per her length of stay.  She pleasantly deferred  support at this time.  Daya denied having any immediate concerns.  She named her two sons and her brother as being core to her support network.  Daya affirmed that she is Congregational and reported that her alonso is an important aspect of her life.  She welcomed my offer to keep her in prayer but declined a prayer in the moment.    Plan: Informed pt how she can request  support, in case her needs change.  This author and other chaplains remain available per pt/family request.    Rob Weiss M.Div., [facility]  Staff     [facility] routine referrals *[phone]  [facility] available 24/7 for emergent requests/referrals, either by paging the on-call  or by entering an ASAP/STAT consult in Epic (this will also page the on-call ).

## 2023-02-02 NOTE — PLAN OF CARE
Goal Outcome Evaluation:    Pertinent assessments: A&O self, situation.VSS, denies pain. Encouraged frequent repositioning, did not want to get out of bed. Poor appetite, encouraged fluids. BS well maintained. Purewick in place. Slept majority of shift.    Major Shift Events: Worked with PT, cont to encourage ambulation.     Treatment Plan: Awaiting TCU placement.

## 2023-02-02 NOTE — PLAN OF CARE
To Do:  End of Shift Summary  For vital signs and complete assessments, please see documentation flowsheets.     Pertinent assessments: VSS. Disoriented to situation, forgetful. On RA. C/o tooth pain, PRN Norco given x1 with apple sauce. BS hypo. Purewick in place, incont in brief, changed. 1 small BM. Weight shifts in bed. Ax1. Easy to chew, thin liquid diet. Pt slept well.   Major Shift Events: None.   Treatment Plan: Monitor pain, LS, [id number]. Continue pain mgmt. Discharge TBD.    Bedside Nurse: Florecita Blanchard RN

## 2023-02-03 ENCOUNTER — APPOINTMENT (OUTPATIENT)
Dept: SPEECH THERAPY | Facility: CLINIC | Age: 88
DRG: 177 | End: 2023-02-03
Payer: COMMERCIAL

## 2023-02-03 LAB
GLUCOSE BLDC GLUCOMTR-MCNC: 100 MG/DL (ref 70–99)
GLUCOSE BLDC GLUCOMTR-MCNC: 119 MG/DL (ref 70–99)
GLUCOSE BLDC GLUCOMTR-MCNC: 129 MG/DL (ref 70–99)
GLUCOSE BLDC GLUCOMTR-MCNC: 185 MG/DL (ref 70–99)
GLUCOSE BLDC GLUCOMTR-MCNC: 92 MG/DL (ref 70–99)
PLATELET # BLD AUTO: 297 10E3/UL (ref 150–450)

## 2023-02-03 PROCEDURE — 250N000011 HC RX IP 250 OP 636: Performed by: INTERNAL MEDICINE

## 2023-02-03 PROCEDURE — 250N000013 HC RX MED GY IP 250 OP 250 PS 637: Performed by: FAMILY MEDICINE

## 2023-02-03 PROCEDURE — 250N000012 HC RX MED GY IP 250 OP 636 PS 637: Performed by: INTERNAL MEDICINE

## 2023-02-03 PROCEDURE — 85049 AUTOMATED PLATELET COUNT: CPT | Performed by: HOSPITALIST

## 2023-02-03 PROCEDURE — 36415 COLL VENOUS BLD VENIPUNCTURE: CPT | Performed by: HOSPITALIST

## 2023-02-03 PROCEDURE — 92526 ORAL FUNCTION THERAPY: CPT | Mod: GN

## 2023-02-03 PROCEDURE — 99232 SBSQ HOSP IP/OBS MODERATE 35: CPT | Performed by: HOSPITALIST

## 2023-02-03 PROCEDURE — 250N000013 HC RX MED GY IP 250 OP 250 PS 637: Performed by: INTERNAL MEDICINE

## 2023-02-03 PROCEDURE — 120N000001 HC R&B MED SURG/OB

## 2023-02-03 PROCEDURE — 250N000013 HC RX MED GY IP 250 OP 250 PS 637: Performed by: HOSPITALIST

## 2023-02-03 RX ADMIN — AMLODIPINE BESYLATE 10 MG: 10 TABLET ORAL at 09:43

## 2023-02-03 RX ADMIN — ISOSORBIDE DINITRATE 10 MG: 10 TABLET ORAL at 21:47

## 2023-02-03 RX ADMIN — SERTRALINE HYDROCHLORIDE 100 MG: 100 TABLET ORAL at 09:41

## 2023-02-03 RX ADMIN — CLONIDINE HYDROCHLORIDE 0.2 MG: 0.1 TABLET ORAL at 21:47

## 2023-02-03 RX ADMIN — HYDROXYZINE HYDROCHLORIDE 5 MG: 10 TABLET ORAL at 09:42

## 2023-02-03 RX ADMIN — LISINOPRIL 30 MG: 20 TABLET ORAL at 09:46

## 2023-02-03 RX ADMIN — GABAPENTIN 300 MG: 300 CAPSULE ORAL at 21:46

## 2023-02-03 RX ADMIN — HYDROCODONE BITARTRATE AND ACETAMINOPHEN 1 TABLET: 5; 325 TABLET ORAL at 18:40

## 2023-02-03 RX ADMIN — PANTOPRAZOLE SODIUM 40 MG: 40 TABLET, DELAYED RELEASE ORAL at 09:43

## 2023-02-03 RX ADMIN — Medication 250 MG: at 09:42

## 2023-02-03 RX ADMIN — CARVEDILOL 12.5 MG: 12.5 TABLET, FILM COATED ORAL at 09:40

## 2023-02-03 RX ADMIN — DEXAMETHASONE 6 MG: 2 TABLET ORAL at 09:43

## 2023-02-03 RX ADMIN — GABAPENTIN 300 MG: 300 CAPSULE ORAL at 09:41

## 2023-02-03 RX ADMIN — ISOSORBIDE DINITRATE 10 MG: 10 TABLET ORAL at 17:05

## 2023-02-03 RX ADMIN — MICONAZOLE NITRATE: 20 POWDER TOPICAL at 21:48

## 2023-02-03 RX ADMIN — ACETAMINOPHEN 650 MG: 325 TABLET, FILM COATED ORAL at 21:47

## 2023-02-03 RX ADMIN — ISOSORBIDE DINITRATE 10 MG: 10 TABLET ORAL at 09:41

## 2023-02-03 RX ADMIN — MICONAZOLE NITRATE: 20 POWDER TOPICAL at 09:47

## 2023-02-03 RX ADMIN — ACETAMINOPHEN 650 MG: 325 TABLET, FILM COATED ORAL at 09:39

## 2023-02-03 RX ADMIN — HYDROXYZINE HYDROCHLORIDE 5 MG: 10 TABLET ORAL at 21:47

## 2023-02-03 RX ADMIN — CARVEDILOL 25 MG: 25 TABLET, FILM COATED ORAL at 21:47

## 2023-02-03 RX ADMIN — ENOXAPARIN SODIUM 40 MG: 40 INJECTION SUBCUTANEOUS at 17:03

## 2023-02-03 RX ADMIN — SENNOSIDES AND DOCUSATE SODIUM 1 TABLET: 50; 8.6 TABLET ORAL at 21:47

## 2023-02-03 RX ADMIN — ACETAMINOPHEN 650 MG: 325 TABLET, FILM COATED ORAL at 17:05

## 2023-02-03 RX ADMIN — GABAPENTIN 300 MG: 300 CAPSULE ORAL at 17:05

## 2023-02-03 ASSESSMENT — ACTIVITIES OF DAILY LIVING (ADL)
ADLS_ACUITY_SCORE: 53
ADLS_ACUITY_SCORE: 49
ADLS_ACUITY_SCORE: 53
ADLS_ACUITY_SCORE: 49

## 2023-02-03 NOTE — PROGRESS NOTES
Hospitalist Progress Note             Date of Admission:  1/23/2023                   Day of hospitalization: 11    Assessment and Plan:   Daya Ignacio is a 89 year old female with known prior history of hypertension, prior C. difficile colitis, chronic pain on chronic narcotics, multiple compression fractures in the past, ESBL UTI, anxiety, depression, cognitive impairment and reportedly lives in an assisted living facility set up and unfortunately has to presents in the emergency room by emergency personnel earlier after she was found having jerking-like sensation and concerns for aspiration while partaking her dinner food tray.  During EMS evaluation she was found to be hypoxic at 61% via room air but were able to increase to close to 90% utilizing 15 L of oxygen in route here     Afternoon of 1/24/2023 she had worsening mental status, which culminated in vomiting.  Further work-up is being pursued.       Acute respiratory failure with hypoxia (H)  Acute COVID-19 infection with possible underlying COVID-19 pneumonia with hypoxia   -Subsequent procalcitonin low risk   -COVID-19 screen and influenza screen pursued due to fever spike last 1/26/2023   -Started on remdesivir and Decadron last 1/26/2023   -Finished remdesivir  -Leukocytosis resolved  -Continue to wean and titrate oxygen on Room air  -Finish oral antibiotics course for isolated sensitive E. coli with UTI.   -Done with antibiotics   -COVID-19 isolation precautions    -Blood cultures remain no growth up-to-date    Infectious encephalopathy-improving  -Earlier showing alteration in mental state.  Received 2 doses of 25 mg Seroquel  - Seroquel discontinued as patient became very sedated and lethargic.  Continue to hold any further anxiolytics  - No obvious seizure-like activity  - Reassuring CT of the head    HTN  -Now with increasing blood pressure levels, will resume all of her oral home regimen antihypertensive  -  "on Clonidine .2 mg at bedtime (increased today), coreg 12.5 in AM, 25 mg in PM, isosorbide dinatrate 10 mg TID, Lisinopril 30 mg    Chronic pain on chronic narcotics  -I reviewed her electronic medical records, home regimen and she is able to tolerate narcotics and opioids in the past.  -Endorsing no worsening pain issues.  I discontinued oral hydromorphone and IV hydromorphone and continued on her home regimen of hydrocodone given worsening mental status changes.     Physical deconditioning  -Appreciate input from PT, OT likely will be needing TCU placement     Stable acute on chronic anemia likely of chronic illness  -Most likely worsening anemia from frequent blood draws  -No bleeding tendencies here  -Does not need packed RBC transfusion yet        ---------     # Code status: DNR/DNI  # Anticipated discharge date and Disposition: needs TCU, medically ready, discussed with social work  # DVT: Lovenox  # IVF: None                       Freya Muir MD  Text Page (7am - 6pm, M-F)               Subjective   Chief Complaint:  Weakness  Subjective: No complaints feels well no chest pain nausea vomiting abdominal pain.  Has lower back pain. Has weakness overall.        Objective   BP (!) 144/57 (BP Location: Right arm)   Pulse 56   Temp 97.4  F (36.3  C) (Oral)   Resp 18   Ht 1.549 m (5' 1\")   Wt 57.9 kg (127 lb 9.6 oz)   SpO2 97%   BMI 24.11 kg/m       Physical Exam  General: Pt in NAD, normal appearance  HEENT: OP clear MMM, no JVD  Lungs: Clear to Auscultation Bilateral, normal breathing  without accessory muscle usage, no wheezing, rhonchi or crackles  Cardiac: +S1, S2, RRR, no MRG, no edema  Abdominal: normal bowel sounds, NT/ND, no hepatosplenomegaly  Skin: warm, dry, normal turgor, no rash  Psyche: A& O x3, appropriate affect             Intake/Output Summary (Last 24 hours) at 2/1/2023 1142  Last data filed at 2/1/2023 1113  Gross per 24 hour   Intake 480 ml   Output 1050 ml   Net -570 ml         "   Labs and Imaging Results:      Recent Labs   Lab 02/03/23  0748 01/29/23  0724 01/28/23  0704   WBC  --  5.4 5.5   HGB  --  7.8* 7.6*    178 169        Recent Labs   Lab 01/30/23  0748 01/29/23  0724    140   CO2 27 26   BUN 13.1 16.3      No results for input(s): INR, PTT in the last 168 hours.   No results for input(s): CKMB in the last 168 hours.    Invalid input(s): TROPONINT   No results for input(s): ALBUMIN, AST, ALT, ALKPHOS, BILITOT in the last 168 hours.     Micro:     Radio:  XR Chest Port 1 View   Final Result   IMPRESSION: Stable mild bibasilar retrocardiac atelectasis/pneumonia.   No pleural effusion or pneumothorax. Normal heart size.      HATTIE DEVINE MD            SYSTEM ID:  [id number]      CT Head w/o Contrast   Final Result   IMPRESSION:   1.  No CT evidence for acute intracranial process.   2.  Brain atrophy and presumed chronic microvascular ischemic changes, mildly progressed since 2019.      XR Video Swallow with SLP or OT   Final Result   IMPRESSION:   1. Penetration with thin liquids. No aspiration identified.   2. Please refer to the speech pathology report for further details.      This study includes only the cervical esophagus.      DINORAH SO MD            SYSTEM ID:  [id number]      XR Chest Port 1 View   Final Result   IMPRESSION: Fine linear opacities radiating from the elham into the periphery of both lungs have developed consistent with mild interstitial edema. Heart size and pulmonary vascularity upper normal. Bones are demineralized.              Medications:      Scheduled Meds:      acetaminophen  650 mg Oral TID     amLODIPine  10 mg Oral Daily     carvedilol  12.5 mg Oral QAM     carvedilol  25 mg Oral At Bedtime     cloNIDine  0.2 mg Oral At Bedtime     dexamethasone  6 mg Oral Daily     enoxaparin ANTICOAGULANT  40 mg Subcutaneous Q24H     gabapentin  300 mg Oral TID     hydrOXYzine  5 mg Oral BID     insulin aspart  1-3 Units Subcutaneous TID AC     isosorbide  dinitrate  10 mg Oral TID     lisinopril  30 mg Oral Daily     miconazole   Topical BID     pantoprazole  40 mg Oral QAM AC     saccharomyces boulardii  250 mg Oral Daily     senna-docusate  1 tablet Oral At Bedtime     sertraline  100 mg Oral Daily     sodium chloride (PF)  3 mL Intracatheter Q8H     Continuous Infusions:    PRN Meds:  albuterol, glucose **OR** dextrose **OR** glucagon, hydrALAZINE, HYDROcodone-acetaminophen, ipratropium - albuterol 0.5 mg/2.5 mg/3 mL, lidocaine 4%, lidocaine (buffered or not buffered), melatonin, naloxone **OR** naloxone **OR** naloxone **OR** naloxone, ondansetron **OR** ondansetron, polyethylene glycol, senna-docusate **OR** senna-docusate, sodium chloride (PF)

## 2023-02-03 NOTE — PLAN OF CARE
End of Shift Summary  For vital signs and complete assessments, please see documentation flowsheets.     Pertinent assessments: VSS. Afebrile. LS diminished. BS x4. Pt denies pain and nausea. Regular diet. Ax1 with Belt and Walker to ambulate. PIV - SL. Disorientated to place/situation; alarm on bed for safety. Slept well     Major Shift Events: none    Treatment Plan: Decadron, Encourage activity, Discharge to TCU when placement found.

## 2023-02-03 NOTE — PLAN OF CARE
End of Shift Summary  For vital signs and complete assessments, please see documentation flowsheets.     Pertinent assessments: Alert, disoriented x situation. VSS, afebrile & sats maintained on RA. C/o chronic back pain, no PRNs this shift. Refused OOB, purewick in place with adequate UOP. LS diminished, infrequent cough.    Major Shift Events: Uneventful  Treatment Plan: Discharge pending  Bedside Nurse: Bonita Zuleta RN

## 2023-02-03 NOTE — PROGRESS NOTES
Speech Language Therapy Discharge Summary    Reason for therapy discharge:    All goals and outcomes met, no further needs identified.    Progress towards therapy goal(s). See goals on Care Plan in [facility] electronic health record for goal details.  Goals met    Therapy recommendation(s):    No further therapy is recommended.     Recommend a regular texture diet and thin liquids. Please ensure pt sits upright (ideally in the chair) for meals, takes small bites and sips and alternates consistencies. She does have a known hx of esophageal dysphagia as well so esophageal precautions are recommended.

## 2023-02-03 NOTE — PROGRESS NOTES
Care Management Follow Up    Length of Stay (days): 11    Expected Discharge Date: 02/03/2023     Concerns to be Addressed:       Patient plan of care discussed at interdisciplinary rounds: Yes    Anticipated Discharge Disposition: Transitional Care vs return to [facility]     Anticipated Discharge Services:    Anticipated Discharge DME:      Patient/family educated on Medicare website which has current facility and service quality ratings:    Education Provided on the Discharge Plan:    Patient/Family in Agreement with the Plan: yes      Additional Information:  SW left a voicemail for [facility] services, Erlinda ([phone]) & requested a return phone call to discuss if patient could return to facility with her current level of care & mobility. SW will continue to follow to assist in discharge planning.       ROSE Hathaway

## 2023-02-03 NOTE — PLAN OF CARE
Goal Outcome Evaluation:             To Do:  End of Shift Summary  For vital signs and complete assessments, please see documentation flowsheets.     Pertinent assessments:  Alert to self - refuses cares -- pure wick in place --  denies pain and nausea --   fair appetite --  Major Shift Events  resting comfortable  Treatment Plan: encourage  Bedside Nurse: Rocio Chase RN

## 2023-02-04 ENCOUNTER — APPOINTMENT (OUTPATIENT)
Dept: PHYSICAL THERAPY | Facility: CLINIC | Age: 88
DRG: 177 | End: 2023-02-04
Payer: COMMERCIAL

## 2023-02-04 LAB
GLUCOSE BLDC GLUCOMTR-MCNC: 143 MG/DL (ref 70–99)
GLUCOSE BLDC GLUCOMTR-MCNC: 148 MG/DL (ref 70–99)
GLUCOSE BLDC GLUCOMTR-MCNC: 82 MG/DL (ref 70–99)
GLUCOSE BLDC GLUCOMTR-MCNC: 98 MG/DL (ref 70–99)
GLUCOSE BLDC GLUCOMTR-MCNC: 99 MG/DL (ref 70–99)
POTASSIUM SERPL-SCNC: 4.5 MMOL/L (ref 3.4–5.3)

## 2023-02-04 PROCEDURE — 250N000013 HC RX MED GY IP 250 OP 250 PS 637: Performed by: HOSPITALIST

## 2023-02-04 PROCEDURE — 97530 THERAPEUTIC ACTIVITIES: CPT | Mod: GP

## 2023-02-04 PROCEDURE — 99232 SBSQ HOSP IP/OBS MODERATE 35: CPT | Performed by: HOSPITALIST

## 2023-02-04 PROCEDURE — 250N000013 HC RX MED GY IP 250 OP 250 PS 637: Performed by: INTERNAL MEDICINE

## 2023-02-04 PROCEDURE — 97110 THERAPEUTIC EXERCISES: CPT | Mod: GP

## 2023-02-04 PROCEDURE — 36415 COLL VENOUS BLD VENIPUNCTURE: CPT | Performed by: HOSPITALIST

## 2023-02-04 PROCEDURE — 84132 ASSAY OF SERUM POTASSIUM: CPT | Performed by: HOSPITALIST

## 2023-02-04 PROCEDURE — 120N000001 HC R&B MED SURG/OB

## 2023-02-04 PROCEDURE — 250N000013 HC RX MED GY IP 250 OP 250 PS 637: Performed by: FAMILY MEDICINE

## 2023-02-04 PROCEDURE — 250N000012 HC RX MED GY IP 250 OP 636 PS 637: Performed by: INTERNAL MEDICINE

## 2023-02-04 PROCEDURE — 250N000011 HC RX IP 250 OP 636: Performed by: INTERNAL MEDICINE

## 2023-02-04 RX ADMIN — ENOXAPARIN SODIUM 40 MG: 40 INJECTION SUBCUTANEOUS at 12:35

## 2023-02-04 RX ADMIN — GABAPENTIN 300 MG: 300 CAPSULE ORAL at 21:37

## 2023-02-04 RX ADMIN — HYDROCODONE BITARTRATE AND ACETAMINOPHEN 1 TABLET: 5; 325 TABLET ORAL at 02:03

## 2023-02-04 RX ADMIN — Medication 250 MG: at 08:53

## 2023-02-04 RX ADMIN — LISINOPRIL 30 MG: 20 TABLET ORAL at 08:53

## 2023-02-04 RX ADMIN — GABAPENTIN 300 MG: 300 CAPSULE ORAL at 15:49

## 2023-02-04 RX ADMIN — ISOSORBIDE DINITRATE 10 MG: 10 TABLET ORAL at 08:53

## 2023-02-04 RX ADMIN — MICONAZOLE NITRATE: 20 POWDER TOPICAL at 20:19

## 2023-02-04 RX ADMIN — INSULIN ASPART 1 UNITS: 100 INJECTION, SOLUTION INTRAVENOUS; SUBCUTANEOUS at 17:44

## 2023-02-04 RX ADMIN — AMLODIPINE BESYLATE 10 MG: 10 TABLET ORAL at 08:53

## 2023-02-04 RX ADMIN — ISOSORBIDE DINITRATE 10 MG: 10 TABLET ORAL at 15:49

## 2023-02-04 RX ADMIN — ACETAMINOPHEN 650 MG: 325 TABLET, FILM COATED ORAL at 21:37

## 2023-02-04 RX ADMIN — HYDROCODONE BITARTRATE AND ACETAMINOPHEN 1 TABLET: 5; 325 TABLET ORAL at 12:36

## 2023-02-04 RX ADMIN — CARVEDILOL 12.5 MG: 12.5 TABLET, FILM COATED ORAL at 08:53

## 2023-02-04 RX ADMIN — ISOSORBIDE DINITRATE 10 MG: 10 TABLET ORAL at 21:38

## 2023-02-04 RX ADMIN — ACETAMINOPHEN 650 MG: 325 TABLET, FILM COATED ORAL at 15:49

## 2023-02-04 RX ADMIN — DEXAMETHASONE 6 MG: 2 TABLET ORAL at 08:52

## 2023-02-04 RX ADMIN — SERTRALINE HYDROCHLORIDE 100 MG: 100 TABLET ORAL at 08:53

## 2023-02-04 RX ADMIN — CLONIDINE HYDROCHLORIDE 0.2 MG: 0.1 TABLET ORAL at 21:38

## 2023-02-04 RX ADMIN — HYDROXYZINE HYDROCHLORIDE 5 MG: 10 TABLET ORAL at 20:09

## 2023-02-04 RX ADMIN — GABAPENTIN 300 MG: 300 CAPSULE ORAL at 08:53

## 2023-02-04 RX ADMIN — HYDROXYZINE HYDROCHLORIDE 5 MG: 10 TABLET ORAL at 08:53

## 2023-02-04 RX ADMIN — PANTOPRAZOLE SODIUM 40 MG: 40 TABLET, DELAYED RELEASE ORAL at 08:53

## 2023-02-04 RX ADMIN — SENNOSIDES AND DOCUSATE SODIUM 1 TABLET: 50; 8.6 TABLET ORAL at 21:38

## 2023-02-04 RX ADMIN — MICONAZOLE NITRATE: 20 POWDER TOPICAL at 08:57

## 2023-02-04 RX ADMIN — ACETAMINOPHEN 650 MG: 325 TABLET, FILM COATED ORAL at 08:52

## 2023-02-04 RX ADMIN — HYDROCODONE BITARTRATE AND ACETAMINOPHEN 1 TABLET: 5; 325 TABLET ORAL at 20:08

## 2023-02-04 ASSESSMENT — ACTIVITIES OF DAILY LIVING (ADL)
ADLS_ACUITY_SCORE: 49

## 2023-02-04 NOTE — PROGRESS NOTES
[facility]    Hospitalist Progress Note             Date of Admission:  1/23/2023                   Day of hospitalization: 12    Assessment and Plan:   Daay Ignacio is a 89 year old female with known prior history of hypertension, prior C. difficile colitis, chronic pain on chronic narcotics, multiple compression fractures in the past, ESBL UTI, anxiety, depression, cognitive impairment and reportedly lives in an assisted living facility set up and unfortunately has to presents in the emergency room by emergency personnel earlier after she was found having jerking-like sensation and concerns for aspiration while partaking her dinner food tray.  During EMS evaluation she was found to be hypoxic at 61% via room air but were able to increase to close to 90% utilizing 15 L of oxygen in route here     Afternoon of 1/24/2023 she had worsening mental status, which culminated in vomiting.  Further work-up is being pursued.       Acute respiratory failure with hypoxia (H)  Acute COVID-19 infection with possible underlying COVID-19 pneumonia with hypoxia   -Subsequent procalcitonin low risk   -COVID-19 screen and influenza screen pursued due to fever spike last 1/26/2023   -Started on remdesivir and Decadron last 1/26/2023   -Finished remdesivir  -Leukocytosis resolved  -Continue to wean and titrate oxygen on Room air  -Finish oral antibiotics course for isolated sensitive E. coli with UTI.   -Done with antibiotics   -COVID-19 isolation precautions    -Blood cultures remain no growth up-to-date    Infectious encephalopathy-improving  -Earlier showing alteration in mental state.  Received 2 doses of 25 mg Seroquel  - Seroquel discontinued as patient became very sedated and lethargic.  Continue to hold any further anxiolytics  - No obvious seizure-like activity  - Reassuring CT of the head    HTN  -Now with increasing blood pressure levels, will resume all of her oral home regimen antihypertensive  -  "on Clonidine .2 mg at bedtime (increased today), coreg 12.5 in AM, 25 mg in PM, isosorbide dinatrate 10 mg TID, Lisinopril 30 mg    Chronic pain on chronic narcotics  -I reviewed her electronic medical records, home regimen and she is able to tolerate narcotics and opioids in the past.  - continued on her home regimen of hydrocodone given worsening mental status changes.     Physical deconditioning  -Appreciate input from PT, OT likely will be needing TCU placement     Stable acute on chronic anemia likely of chronic illness  -Most likely worsening anemia from frequent blood draws  -No bleeding tendencies here  -Does not need packed RBC transfusion yet        ---------     # Code status: DNR/DNI  # Anticipated discharge date and Disposition: needs TCU, medically ready, discussed with social work  # DVT: Lovenox  # IVF: None                       Freya Muir MD  Text Page (7am - 6pm, M-F)               Subjective   Chief Complaint:  Weakness  Subjective: No complaints feels well no chest pain nausea vomiting abdominal pain.  Has lower back pain. Has weakness overall.        Objective   BP (!) 150/66 (BP Location: Left arm)   Pulse 55   Temp 98.2  F (36.8  C) (Oral)   Resp 18   Ht 1.549 m (5' 1\")   Wt 57.9 kg (127 lb 9.6 oz)   SpO2 95%   BMI 24.11 kg/m       Physical Exam  General: Pt in NAD, normal appearance  HEENT: OP clear MMM, no JVD  Lungs: Clear to Auscultation Bilateral, normal breathing  without accessory muscle usage, no wheezing, rhonchi or crackles  Cardiac: +S1, S2, RRR, no MRG, no edema  Abdominal: normal bowel sounds, NT/ND, no hepatosplenomegaly  Skin: warm, dry, normal turgor, no rash  Psyche: A& O x3, appropriate affect             Intake/Output Summary (Last 24 hours) at 2/1/2023 1142  Last data filed at 2/1/2023 1113  Gross per 24 hour   Intake 480 ml   Output 1050 ml   Net -570 ml           Labs and Imaging Results:      Recent Labs   Lab 02/03/23  0748 01/29/23  0724   WBC  --  5.4   HGB "  --  7.8*    178        Recent Labs   Lab 01/30/23  0748 01/29/23  0724    140   CO2 27 26   BUN 13.1 16.3      No results for input(s): INR, PTT in the last 168 hours.   No results for input(s): CKMB in the last 168 hours.    Invalid input(s): TROPONINT   No results for input(s): ALBUMIN, AST, ALT, ALKPHOS, BILITOT in the last 168 hours.     Micro:     Radio:  XR Chest Port 1 View   Final Result   IMPRESSION: Stable mild bibasilar retrocardiac atelectasis/pneumonia.   No pleural effusion or pneumothorax. Normal heart size.      HATTIE DEVINE MD            SYSTEM ID:  [id number]      CT Head w/o Contrast   Final Result   IMPRESSION:   1.  No CT evidence for acute intracranial process.   2.  Brain atrophy and presumed chronic microvascular ischemic changes, mildly progressed since 2019.      XR Video Swallow with SLP or OT   Final Result   IMPRESSION:   1. Penetration with thin liquids. No aspiration identified.   2. Please refer to the speech pathology report for further details.      This study includes only the cervical esophagus.      DINORAH SO MD            SYSTEM ID:  [id number]      XR Chest Port 1 View   Final Result   IMPRESSION: Fine linear opacities radiating from the elham into the periphery of both lungs have developed consistent with mild interstitial edema. Heart size and pulmonary vascularity upper normal. Bones are demineralized.              Medications:      Scheduled Meds:      acetaminophen  650 mg Oral TID     amLODIPine  10 mg Oral Daily     carvedilol  12.5 mg Oral QAM     carvedilol  25 mg Oral At Bedtime     cloNIDine  0.2 mg Oral At Bedtime     dexamethasone  6 mg Oral Daily     enoxaparin ANTICOAGULANT  40 mg Subcutaneous Q24H     gabapentin  300 mg Oral TID     hydrOXYzine  5 mg Oral BID     insulin aspart  1-3 Units Subcutaneous TID AC     isosorbide dinitrate  10 mg Oral TID     lisinopril  30 mg Oral Daily     miconazole   Topical BID     pantoprazole  40 mg Oral QAM AC      saccharomyces boulardii  250 mg Oral Daily     senna-docusate  1 tablet Oral At Bedtime     sertraline  100 mg Oral Daily     sodium chloride (PF)  3 mL Intracatheter Q8H     Continuous Infusions:    PRN Meds:  albuterol, glucose **OR** dextrose **OR** glucagon, hydrALAZINE, HYDROcodone-acetaminophen, ipratropium - albuterol 0.5 mg/2.5 mg/3 mL, lidocaine 4%, lidocaine (buffered or not buffered), melatonin, naloxone **OR** naloxone **OR** naloxone **OR** naloxone, ondansetron **OR** ondansetron, polyethylene glycol, senna-docusate **OR** senna-docusate, sodium chloride (PF)

## 2023-02-04 NOTE — PLAN OF CARE
1900 - 2300  Patient alert to self, vitals stable. Scheduled tylenol for generalized aches and pains. Repositioned for comfort. Pure wick in place, adequate output. Occasional cough, lungs diminished.

## 2023-02-04 NOTE — PLAN OF CARE
"Plan of Care: 2875-9763    Pt is oriented to self. Up Ax1, purewick in place. C/o 7/10 back pain, norco x1 given for relief. Repo, pt refusing some cares.   /47   Pulse 59   Temp 97.6  F (36.4  C) (Oral)   Resp 18   Ht 1.549 m (5' 1\")   Wt 57.9 kg (127 lb 9.6 oz)   SpO2 95%   BMI 24.11 kg/m       Major Shift Events: uneventful    Plan: Maintain airborne/contact precautions. Decadron. Discharge to TCU when placement found & covid recovered. BG monitoring.   "

## 2023-02-05 ENCOUNTER — APPOINTMENT (OUTPATIENT)
Dept: OCCUPATIONAL THERAPY | Facility: CLINIC | Age: 88
DRG: 177 | End: 2023-02-05
Payer: COMMERCIAL

## 2023-02-05 ENCOUNTER — APPOINTMENT (OUTPATIENT)
Dept: PHYSICAL THERAPY | Facility: CLINIC | Age: 88
DRG: 177 | End: 2023-02-05
Payer: COMMERCIAL

## 2023-02-05 LAB
GLUCOSE BLDC GLUCOMTR-MCNC: 106 MG/DL (ref 70–99)
GLUCOSE BLDC GLUCOMTR-MCNC: 110 MG/DL (ref 70–99)
GLUCOSE BLDC GLUCOMTR-MCNC: 114 MG/DL (ref 70–99)
GLUCOSE BLDC GLUCOMTR-MCNC: 148 MG/DL (ref 70–99)
GLUCOSE BLDC GLUCOMTR-MCNC: 78 MG/DL (ref 70–99)

## 2023-02-05 PROCEDURE — 97530 THERAPEUTIC ACTIVITIES: CPT | Mod: GP

## 2023-02-05 PROCEDURE — 250N000011 HC RX IP 250 OP 636: Performed by: INTERNAL MEDICINE

## 2023-02-05 PROCEDURE — 120N000001 HC R&B MED SURG/OB

## 2023-02-05 PROCEDURE — 250N000012 HC RX MED GY IP 250 OP 636 PS 637: Performed by: INTERNAL MEDICINE

## 2023-02-05 PROCEDURE — 250N000013 HC RX MED GY IP 250 OP 250 PS 637: Performed by: INTERNAL MEDICINE

## 2023-02-05 PROCEDURE — 97530 THERAPEUTIC ACTIVITIES: CPT | Mod: GO

## 2023-02-05 PROCEDURE — 99232 SBSQ HOSP IP/OBS MODERATE 35: CPT | Performed by: HOSPITALIST

## 2023-02-05 PROCEDURE — 97116 GAIT TRAINING THERAPY: CPT | Mod: GP

## 2023-02-05 PROCEDURE — 250N000013 HC RX MED GY IP 250 OP 250 PS 637: Performed by: HOSPITALIST

## 2023-02-05 PROCEDURE — 250N000013 HC RX MED GY IP 250 OP 250 PS 637: Performed by: FAMILY MEDICINE

## 2023-02-05 RX ADMIN — HYDROXYZINE HYDROCHLORIDE 5 MG: 10 TABLET ORAL at 08:50

## 2023-02-05 RX ADMIN — GABAPENTIN 300 MG: 300 CAPSULE ORAL at 08:50

## 2023-02-05 RX ADMIN — CARVEDILOL 12.5 MG: 12.5 TABLET, FILM COATED ORAL at 08:50

## 2023-02-05 RX ADMIN — ENOXAPARIN SODIUM 40 MG: 40 INJECTION SUBCUTANEOUS at 12:37

## 2023-02-05 RX ADMIN — LISINOPRIL 30 MG: 20 TABLET ORAL at 08:50

## 2023-02-05 RX ADMIN — HYDROXYZINE HYDROCHLORIDE 5 MG: 10 TABLET ORAL at 21:11

## 2023-02-05 RX ADMIN — Medication 250 MG: at 08:50

## 2023-02-05 RX ADMIN — MICONAZOLE NITRATE: 20 POWDER TOPICAL at 08:54

## 2023-02-05 RX ADMIN — GABAPENTIN 300 MG: 300 CAPSULE ORAL at 17:22

## 2023-02-05 RX ADMIN — ISOSORBIDE DINITRATE 10 MG: 10 TABLET ORAL at 21:11

## 2023-02-05 RX ADMIN — AMLODIPINE BESYLATE 10 MG: 10 TABLET ORAL at 08:50

## 2023-02-05 RX ADMIN — ISOSORBIDE DINITRATE 10 MG: 10 TABLET ORAL at 08:50

## 2023-02-05 RX ADMIN — GABAPENTIN 300 MG: 300 CAPSULE ORAL at 21:11

## 2023-02-05 RX ADMIN — SERTRALINE HYDROCHLORIDE 100 MG: 100 TABLET ORAL at 08:49

## 2023-02-05 RX ADMIN — PANTOPRAZOLE SODIUM 40 MG: 40 TABLET, DELAYED RELEASE ORAL at 08:49

## 2023-02-05 RX ADMIN — CLONIDINE HYDROCHLORIDE 0.2 MG: 0.1 TABLET ORAL at 21:11

## 2023-02-05 RX ADMIN — SENNOSIDES AND DOCUSATE SODIUM 1 TABLET: 50; 8.6 TABLET ORAL at 21:11

## 2023-02-05 RX ADMIN — ACETAMINOPHEN 650 MG: 325 TABLET, FILM COATED ORAL at 17:22

## 2023-02-05 RX ADMIN — ACETAMINOPHEN 650 MG: 325 TABLET, FILM COATED ORAL at 21:11

## 2023-02-05 RX ADMIN — ACETAMINOPHEN 650 MG: 325 TABLET, FILM COATED ORAL at 08:49

## 2023-02-05 RX ADMIN — MICONAZOLE NITRATE: 20 POWDER TOPICAL at 21:17

## 2023-02-05 RX ADMIN — ISOSORBIDE DINITRATE 10 MG: 10 TABLET ORAL at 17:22

## 2023-02-05 RX ADMIN — INSULIN ASPART 1 UNITS: 100 INJECTION, SOLUTION INTRAVENOUS; SUBCUTANEOUS at 17:35

## 2023-02-05 RX ADMIN — DEXAMETHASONE 6 MG: 2 TABLET ORAL at 08:49

## 2023-02-05 ASSESSMENT — ACTIVITIES OF DAILY LIVING (ADL)
ADLS_ACUITY_SCORE: 47
ADLS_ACUITY_SCORE: 49
ADLS_ACUITY_SCORE: 47
ADLS_ACUITY_SCORE: 49
ADLS_ACUITY_SCORE: 47
ADLS_ACUITY_SCORE: 49

## 2023-02-05 NOTE — PLAN OF CARE
Goal Outcome Evaluation:\    Vital signs:  Temp: 97.4  F (36.3  C) Temp src: Oral BP: 111/51 Pulse: 55   Resp: 16 SpO2: 96 % O2 Device: None (Room air)     To Do:  End of Shift Summary  For vital signs and complete assessments, please see documentation flowsheets.     Pertinent assessments: Alert , disoriented to time, place, situation. Up assist of one with G/walker. VSS, on RA, no indication of sob, reports back pain, prn Norco and scheduled tylenol given effective, tolerates diet with fair appetite. Alarms in use for safety.     Major Shift Events . Uneventful.    Treatment Plan: pain management, BS monitoring, Decadron , placement pending, continue with POC.     Bedside Nurse: Haylie Madrigal RN

## 2023-02-05 NOTE — PLAN OF CARE
Vitals: WNL  Neuro: Alert to self, forgetful, disoriented to time, place, situation  Cardiac: bradycardic (50s), asymptomatic  Resp: on room iar, no SoB   GI: no BM  : incontinent, on external catheter  Skin: redness over sacrum/coccyx  Activity: Up with Ax1 with gait belt and walker  Pain: complains of some back pain, scheduled tylenol given     Plan: Pending discharge (TCU)

## 2023-02-05 NOTE — PLAN OF CARE
Goal Outcome Evaluation:       Pt up Ax1 with walker & belt. Confused, alarms on. VSS On RA. Bradycardic. Incont of urine, purewick in place. Denies resp sx. Abd & back pain, gave Norco x1.

## 2023-02-05 NOTE — PROGRESS NOTES
[facility]    Hospitalist Progress Note             Date of Admission:  1/23/2023                   Day of hospitalization: 13    Assessment and Plan:   Daya Ignacio is a 89 year old female with known prior history of hypertension, prior C. difficile colitis, chronic pain on chronic narcotics, multiple compression fractures in the past, ESBL UTI, anxiety, depression, cognitive impairment and reportedly lives in an assisted living facility set up and unfortunately has to presents in the emergency room by emergency personnel earlier after she was found having jerking-like sensation and concerns for aspiration while partaking her dinner food tray.  During EMS evaluation she was found to be hypoxic at 61% via room air but were able to increase to close to 90% utilizing 15 L of oxygen in route here     Afternoon of 1/24/2023 she had worsening mental status, which culminated in vomiting.  Further work-up is being pursued. +covid, finished treatment, but still needs to complete decadron. Awaiting placement.       Acute respiratory failure with hypoxia (H)  Acute COVID-19 infection with possible underlying COVID-19 pneumonia with hypoxia   -Subsequent procalcitonin low risk   -COVID-19 screen and influenza screen pursued due to fever spike last 1/26/2023   -Started on remdesivir and Decadron last 1/26/2023   -Finished remdesivir  -Leukocytosis resolved  -Continue to wean and titrate oxygen on Room air  -Finish oral antibiotics course for isolated sensitive E. coli with UTI.   -Done with antibiotics   -COVID-19 isolation precautions    -Blood cultures remain no growth up-to-date    Infectious encephalopathy-improving  -Earlier showing alteration in mental state.  Received 2 doses of 25 mg Seroquel  - Seroquel discontinued as patient became very sedated and lethargic.  Continue to hold any further anxiolytics  - No obvious seizure-like activity  - Reassuring CT of the head    HTN  -Now with increasing  "blood pressure levels, will resume all of her oral home regimen antihypertensive  - on Clonidine .2 mg at bedtime (increased today), coreg 12.5 in AM, 25 mg in PM, isosorbide dinatrate 10 mg TID, Lisinopril 30 mg    Chronic pain on chronic narcotics  -I reviewed her electronic medical records, home regimen and she is able to tolerate narcotics and opioids in the past.  - continued on her home regimen of hydrocodone given worsening mental status changes.     Physical deconditioning  -Appreciate input from PT, OT likely will be needing TCU placement     Stable acute on chronic anemia likely of chronic illness  -Most likely worsening anemia from frequent blood draws  -No bleeding tendencies here  -Does not need packed RBC transfusion yet        ---------     # Code status: DNR/DNI  # Anticipated discharge date and Disposition: needs TCU, medically ready, discussed with social work  # DVT: Lovenox  # IVF: None                       Freya Muir MD  Text Page (7am - 6pm, M-F)               Subjective   Chief Complaint:  Weakness  Subjective: No complaints feels well. Minimal complaints today        Objective   BP (!) 146/54 (BP Location: Left arm)   Pulse 52   Temp 97.8  F (36.6  C) (Oral)   Resp 18   Ht 1.549 m (5' 1\")   Wt 54.4 kg (120 lb)   SpO2 96%   BMI 22.67 kg/m       Physical Exam  General: Pt in NAD, normal appearance  HEENT: OP clear MMM, no JVD  Lungs: Clear to Auscultation Bilateral, normal breathing  without accessory muscle usage, no wheezing, rhonchi or crackles  Cardiac: +S1, S2, RRR, no MRG, no edema  Abdominal: normal bowel sounds, NT/ND, no hepatosplenomegaly  Skin: warm, dry, normal turgor, no rash  Psyche: A& O x3, appropriate affect             Intake/Output Summary (Last 24 hours) at 2/1/2023 1142  Last data filed at 2/1/2023 1113  Gross per 24 hour   Intake 480 ml   Output 1050 ml   Net -570 ml           Labs and Imaging Results:      Recent Labs   Lab 02/03/23  0748         "   Recent Labs   Lab 01/30/23  0748      CO2 27   BUN 13.1      No results for input(s): INR, PTT in the last 168 hours.   No results for input(s): CKMB in the last 168 hours.    Invalid input(s): TROPONINT   No results for input(s): ALBUMIN, AST, ALT, ALKPHOS, BILITOT in the last 168 hours.     Micro:     Radio:  XR Chest Port 1 View   Final Result   IMPRESSION: Stable mild bibasilar retrocardiac atelectasis/pneumonia.   No pleural effusion or pneumothorax. Normal heart size.      HATTIE DEVINE MD            SYSTEM ID:  [id number]      CT Head w/o Contrast   Final Result   IMPRESSION:   1.  No CT evidence for acute intracranial process.   2.  Brain atrophy and presumed chronic microvascular ischemic changes, mildly progressed since 2019.      XR Video Swallow with SLP or OT   Final Result   IMPRESSION:   1. Penetration with thin liquids. No aspiration identified.   2. Please refer to the speech pathology report for further details.      This study includes only the cervical esophagus.      DINORAH SO MD            SYSTEM ID:  [id number]      XR Chest Port 1 View   Final Result   IMPRESSION: Fine linear opacities radiating from the elham into the periphery of both lungs have developed consistent with mild interstitial edema. Heart size and pulmonary vascularity upper normal. Bones are demineralized.              Medications:      Scheduled Meds:      acetaminophen  650 mg Oral TID     amLODIPine  10 mg Oral Daily     carvedilol  12.5 mg Oral QAM     carvedilol  25 mg Oral At Bedtime     cloNIDine  0.2 mg Oral At Bedtime     dexamethasone  6 mg Oral Daily     enoxaparin ANTICOAGULANT  40 mg Subcutaneous Q24H     gabapentin  300 mg Oral TID     hydrOXYzine  5 mg Oral BID     insulin aspart  1-3 Units Subcutaneous TID AC     isosorbide dinitrate  10 mg Oral TID     lisinopril  30 mg Oral Daily     miconazole   Topical BID     pantoprazole  40 mg Oral QAM AC     saccharomyces boulardii  250 mg Oral Daily      senna-docusate  1 tablet Oral At Bedtime     sertraline  100 mg Oral Daily     sodium chloride (PF)  3 mL Intracatheter Q8H     Continuous Infusions:    PRN Meds:  albuterol, glucose **OR** dextrose **OR** glucagon, hydrALAZINE, HYDROcodone-acetaminophen, ipratropium - albuterol 0.5 mg/2.5 mg/3 mL, lidocaine 4%, lidocaine (buffered or not buffered), melatonin, naloxone **OR** naloxone **OR** naloxone **OR** naloxone, ondansetron **OR** ondansetron, polyethylene glycol, senna-docusate **OR** senna-docusate, sodium chloride (PF)

## 2023-02-06 ENCOUNTER — APPOINTMENT (OUTPATIENT)
Dept: CT IMAGING | Facility: CLINIC | Age: 88
DRG: 177 | End: 2023-02-06
Attending: NURSE PRACTITIONER
Payer: COMMERCIAL

## 2023-02-06 ENCOUNTER — APPOINTMENT (OUTPATIENT)
Dept: PHYSICAL THERAPY | Facility: CLINIC | Age: 88
DRG: 177 | End: 2023-02-06
Payer: COMMERCIAL

## 2023-02-06 ENCOUNTER — APPOINTMENT (OUTPATIENT)
Dept: CT IMAGING | Facility: CLINIC | Age: 88
DRG: 177 | End: 2023-02-06
Attending: INTERNAL MEDICINE
Payer: COMMERCIAL

## 2023-02-06 LAB
ALBUMIN UR-MCNC: NEGATIVE MG/DL
ANION GAP SERPL CALCULATED.3IONS-SCNC: 15 MMOL/L (ref 7–15)
APPEARANCE UR: CLEAR
BILIRUB UR QL STRIP: NEGATIVE
BUN SERPL-MCNC: 28.9 MG/DL (ref 8–23)
CALCIUM SERPL-MCNC: 9.6 MG/DL (ref 8.2–9.6)
CHLORIDE SERPL-SCNC: 97 MMOL/L (ref 98–107)
COLOR UR AUTO: ABNORMAL
CREAT SERPL-MCNC: 0.66 MG/DL (ref 0.51–0.95)
DEPRECATED HCO3 PLAS-SCNC: 20 MMOL/L (ref 22–29)
ERYTHROCYTE [DISTWIDTH] IN BLOOD BY AUTOMATED COUNT: 16 % (ref 10–15)
GFR SERPL CREATININE-BSD FRML MDRD: 82 ML/MIN/1.73M2
GLUCOSE BLDC GLUCOMTR-MCNC: 111 MG/DL (ref 70–99)
GLUCOSE BLDC GLUCOMTR-MCNC: 129 MG/DL (ref 70–99)
GLUCOSE BLDC GLUCOMTR-MCNC: 134 MG/DL (ref 70–99)
GLUCOSE BLDC GLUCOMTR-MCNC: 148 MG/DL (ref 70–99)
GLUCOSE BLDC GLUCOMTR-MCNC: 196 MG/DL (ref 70–99)
GLUCOSE BLDC GLUCOMTR-MCNC: 79 MG/DL (ref 70–99)
GLUCOSE SERPL-MCNC: 136 MG/DL (ref 70–99)
GLUCOSE UR STRIP-MCNC: NEGATIVE MG/DL
HCT VFR BLD AUTO: 33.6 % (ref 35–47)
HGB BLD-MCNC: 11.2 G/DL (ref 11.7–15.7)
HGB UR QL STRIP: NEGATIVE
HYALINE CASTS: 1 /LPF
KETONES UR STRIP-MCNC: NEGATIVE MG/DL
LACTATE SERPL-SCNC: 2.2 MMOL/L (ref 0.7–2)
LACTATE SERPL-SCNC: 4.7 MMOL/L (ref 0.7–2)
LEUKOCYTE ESTERASE UR QL STRIP: ABNORMAL
MCH RBC QN AUTO: 33.3 PG (ref 26.5–33)
MCHC RBC AUTO-ENTMCNC: 33.3 G/DL (ref 31.5–36.5)
MCV RBC AUTO: 100 FL (ref 78–100)
MUCOUS THREADS #/AREA URNS LPF: PRESENT /LPF
NITRATE UR QL: NEGATIVE
PH UR STRIP: 6 [PH] (ref 5–7)
PLATELET # BLD AUTO: 240 10E3/UL (ref 150–450)
PLATELET # BLD AUTO: 379 10E3/UL (ref 150–450)
POTASSIUM SERPL-SCNC: 4.2 MMOL/L (ref 3.4–5.3)
POTASSIUM SERPL-SCNC: 4.4 MMOL/L (ref 3.4–5.3)
RBC # BLD AUTO: 3.36 10E6/UL (ref 3.8–5.2)
RBC URINE: <1 /HPF
SODIUM SERPL-SCNC: 132 MMOL/L (ref 136–145)
SP GR UR STRIP: 1.03 (ref 1–1.03)
SQUAMOUS EPITHELIAL: <1 /HPF
TROPONIN T SERPL HS-MCNC: 25 NG/L
UROBILINOGEN UR STRIP-MCNC: NORMAL MG/DL
WBC # BLD AUTO: 22.2 10E3/UL (ref 4–11)
WBC URINE: 5 /HPF

## 2023-02-06 PROCEDURE — 0042T CT HEAD PERFUSION W CONTRAST: CPT

## 2023-02-06 PROCEDURE — 99207 PR NO BILLABLE SERVICE THIS VISIT: CPT | Performed by: STUDENT IN AN ORGANIZED HEALTH CARE EDUCATION/TRAINING PROGRAM

## 2023-02-06 PROCEDURE — 250N000013 HC RX MED GY IP 250 OP 250 PS 637: Performed by: INTERNAL MEDICINE

## 2023-02-06 PROCEDURE — 82310 ASSAY OF CALCIUM: CPT | Performed by: INTERNAL MEDICINE

## 2023-02-06 PROCEDURE — 250N000011 HC RX IP 250 OP 636: Performed by: INTERNAL MEDICINE

## 2023-02-06 PROCEDURE — 250N000009 HC RX 250: Performed by: INTERNAL MEDICINE

## 2023-02-06 PROCEDURE — 250N000013 HC RX MED GY IP 250 OP 250 PS 637: Performed by: HOSPITALIST

## 2023-02-06 PROCEDURE — 97530 THERAPEUTIC ACTIVITIES: CPT | Mod: GP | Performed by: PHYSICAL THERAPIST

## 2023-02-06 PROCEDURE — 36415 COLL VENOUS BLD VENIPUNCTURE: CPT | Performed by: INTERNAL MEDICINE

## 2023-02-06 PROCEDURE — 83605 ASSAY OF LACTIC ACID: CPT | Performed by: INTERNAL MEDICINE

## 2023-02-06 PROCEDURE — 84484 ASSAY OF TROPONIN QUANT: CPT | Performed by: INTERNAL MEDICINE

## 2023-02-06 PROCEDURE — 70496 CT ANGIOGRAPHY HEAD: CPT

## 2023-02-06 PROCEDURE — 85027 COMPLETE CBC AUTOMATED: CPT | Performed by: HOSPITALIST

## 2023-02-06 PROCEDURE — 85018 HEMOGLOBIN: CPT | Performed by: INTERNAL MEDICINE

## 2023-02-06 PROCEDURE — 258N000003 HC RX IP 258 OP 636: Performed by: INTERNAL MEDICINE

## 2023-02-06 PROCEDURE — 99291 CRITICAL CARE FIRST HOUR: CPT | Performed by: INTERNAL MEDICINE

## 2023-02-06 PROCEDURE — 250N000013 HC RX MED GY IP 250 OP 250 PS 637: Performed by: FAMILY MEDICINE

## 2023-02-06 PROCEDURE — 999N000157 HC STATISTIC RCP TIME EA 10 MIN

## 2023-02-06 PROCEDURE — 85049 AUTOMATED PLATELET COUNT: CPT | Performed by: HOSPITALIST

## 2023-02-06 PROCEDURE — 120N000001 HC R&B MED SURG/OB

## 2023-02-06 PROCEDURE — 70450 CT HEAD/BRAIN W/O DYE: CPT

## 2023-02-06 PROCEDURE — 99233 SBSQ HOSP IP/OBS HIGH 50: CPT | Mod: CS | Performed by: INTERNAL MEDICINE

## 2023-02-06 PROCEDURE — 250N000012 HC RX MED GY IP 250 OP 636 PS 637: Performed by: INTERNAL MEDICINE

## 2023-02-06 PROCEDURE — 70498 CT ANGIOGRAPHY NECK: CPT

## 2023-02-06 PROCEDURE — 85041 AUTOMATED RBC COUNT: CPT | Performed by: INTERNAL MEDICINE

## 2023-02-06 PROCEDURE — 84132 ASSAY OF SERUM POTASSIUM: CPT | Performed by: INTERNAL MEDICINE

## 2023-02-06 PROCEDURE — 81001 URINALYSIS AUTO W/SCOPE: CPT | Performed by: INTERNAL MEDICINE

## 2023-02-06 RX ORDER — IOPAMIDOL 755 MG/ML
500 INJECTION, SOLUTION INTRAVASCULAR ONCE
Status: COMPLETED | OUTPATIENT
Start: 2023-02-06 | End: 2023-02-06

## 2023-02-06 RX ORDER — CLONIDINE HYDROCHLORIDE 0.1 MG/1
0.1 TABLET ORAL 3 TIMES DAILY
Status: DISCONTINUED | OUTPATIENT
Start: 2023-02-06 | End: 2023-02-10 | Stop reason: HOSPADM

## 2023-02-06 RX ADMIN — CLONIDINE HYDROCHLORIDE 0.1 MG: 0.1 TABLET ORAL at 21:43

## 2023-02-06 RX ADMIN — SENNOSIDES AND DOCUSATE SODIUM 1 TABLET: 50; 8.6 TABLET ORAL at 21:37

## 2023-02-06 RX ADMIN — IOPAMIDOL 125 ML: 755 INJECTION, SOLUTION INTRAVENOUS at 12:06

## 2023-02-06 RX ADMIN — MICONAZOLE NITRATE: 20 POWDER TOPICAL at 21:46

## 2023-02-06 RX ADMIN — Medication 250 MG: at 09:32

## 2023-02-06 RX ADMIN — SODIUM CHLORIDE 1000 ML: 9 INJECTION, SOLUTION INTRAVENOUS at 12:45

## 2023-02-06 RX ADMIN — LISINOPRIL 30 MG: 20 TABLET ORAL at 09:31

## 2023-02-06 RX ADMIN — AMLODIPINE BESYLATE 10 MG: 10 TABLET ORAL at 09:32

## 2023-02-06 RX ADMIN — INSULIN ASPART 1 UNITS: 100 INJECTION, SOLUTION INTRAVENOUS; SUBCUTANEOUS at 17:37

## 2023-02-06 RX ADMIN — DEXAMETHASONE 6 MG: 2 TABLET ORAL at 09:32

## 2023-02-06 RX ADMIN — SERTRALINE HYDROCHLORIDE 100 MG: 100 TABLET ORAL at 09:32

## 2023-02-06 RX ADMIN — SODIUM CHLORIDE 1000 ML: 9 INJECTION, SOLUTION INTRAVENOUS at 14:31

## 2023-02-06 RX ADMIN — GABAPENTIN 300 MG: 300 CAPSULE ORAL at 09:31

## 2023-02-06 RX ADMIN — ENOXAPARIN SODIUM 40 MG: 40 INJECTION SUBCUTANEOUS at 12:45

## 2023-02-06 RX ADMIN — GABAPENTIN 300 MG: 300 CAPSULE ORAL at 17:32

## 2023-02-06 RX ADMIN — ACETAMINOPHEN 650 MG: 325 TABLET, FILM COATED ORAL at 17:33

## 2023-02-06 RX ADMIN — PANTOPRAZOLE SODIUM 40 MG: 40 TABLET, DELAYED RELEASE ORAL at 09:32

## 2023-02-06 RX ADMIN — HYDROXYZINE HYDROCHLORIDE 5 MG: 10 TABLET ORAL at 21:37

## 2023-02-06 RX ADMIN — SODIUM CHLORIDE 80 ML: 9 INJECTION, SOLUTION INTRAVENOUS at 12:06

## 2023-02-06 RX ADMIN — ACETAMINOPHEN 650 MG: 325 TABLET, FILM COATED ORAL at 09:31

## 2023-02-06 RX ADMIN — ISOSORBIDE DINITRATE 10 MG: 10 TABLET ORAL at 17:32

## 2023-02-06 RX ADMIN — ACETAMINOPHEN 650 MG: 325 TABLET, FILM COATED ORAL at 21:37

## 2023-02-06 RX ADMIN — HYDROXYZINE HYDROCHLORIDE 5 MG: 10 TABLET ORAL at 09:31

## 2023-02-06 RX ADMIN — GABAPENTIN 300 MG: 300 CAPSULE ORAL at 21:37

## 2023-02-06 RX ADMIN — CARVEDILOL 12.5 MG: 12.5 TABLET, FILM COATED ORAL at 09:32

## 2023-02-06 RX ADMIN — MICONAZOLE NITRATE: 20 POWDER TOPICAL at 09:33

## 2023-02-06 RX ADMIN — ISOSORBIDE DINITRATE 10 MG: 10 TABLET ORAL at 09:33

## 2023-02-06 RX ADMIN — ISOSORBIDE DINITRATE 10 MG: 10 TABLET ORAL at 21:37

## 2023-02-06 RX ADMIN — HYDROCODONE BITARTRATE AND ACETAMINOPHEN 1 TABLET: 5; 325 TABLET ORAL at 04:51

## 2023-02-06 ASSESSMENT — ACTIVITIES OF DAILY LIVING (ADL)
ADLS_ACUITY_SCORE: 47

## 2023-02-06 NOTE — CONSULTS
[facility]    Stroke Telephone Note    I was called by  on 02/06/23 regarding patient Daya Ignacio. The patient is a 89 year old female with a PMHx significant for HTN, anemia, chronic pain on chronic narcotics, ESBL UTI, anxiety, depression, and cognitive impairment who was admitted on 1/23 after an aspiration event. The day after admission she had worsening mental status and vomiting. Work up revealed COVID and UTI. She has finished a course of remdesivir and antibiotics and continues on decadron. She is awaiting TCU placement.   Today, while working with therapy, she became acutely dizzy and had new facial weakness. Her BP was 70/34, pulse 39-45. Lactate 4.7. Her symptoms improved after returning to the floor from CT.     Stroke Code Data (for stroke code without tele)  Stroke code activated 02/06/23   1121   Stroke provider first response  02/06/23   1123            Last known normal 02/06/23   1120        Time of discovery   (or onset of symptoms) 02/06/23   1120   Head CT read by Stroke Neuro Dr/Provider 02/06/23   1201   Was stroke code de-escalated? Yes 02/06/23 1232          Imaging Findings   CT Head  Diffuse cerebral volume loss and cerebral white matter changes consistent with chronic small vessel ischemic disease. No evidence for acute intracranial pathology.    CTA Head/Neck   1. Plaque without stenosis at the origins of the internal carotid  arteries bilaterally.  2. Otherwise, normal head and neck CTA.    CT Perfusion  Normal CT perfusion of the brain.    Intravenous Thrombolysis  Not given due to:   - minor/isolated/quickly resolving symptoms  - stroke mimic: hypotension/bradycardia    Endovascular Treatment  Not initiated due to absence of proximal vessel occlusion    Impression  Dizziness, facial weakness: most likely d/t hypotension and bradycardia, no stroke or LVO on CT/CTA, CT perfusion normal    Recommendations   - non urgent MRI brain w/ and w/out contrast to r/o  "stroke (ordered)  - agree w/ gentle fluid resuscitation   - please place an inpatient stroke consult if pt is found to have stroke on MRI    My recommendations are based on the information provided over the phone by Daya Ignacio's in-person providers. They are not intended to replace the clinical judgment of her in-person providers. I was not requested to personally see or examine the patient at this time.    Isabel Real NP  Vascular Neurology    To page me or covering stroke neurology team member, click here: [facility]  Choose \"On Call\" tab at top, then select \"NEUROLOGY/ALL SITES\" from middle drop-down box, press Enter, then look for \"stroke\" or \"telestroke\" for your site.        "

## 2023-02-06 NOTE — PROGRESS NOTES
CLINICAL NUTRITION SERVICES - REASSESSMENT NOTE      Recommendations:   - Diet per SLP (previously following)/MD.  Appreciate any encouragement or assistance at meal times provided by nursing.  - 4 oz Ensure offerings w/ meals TID.    - Given ongoing poor PO intakes during admit, possible wt loss, malnutrition criteria met, but patient is medically ready for discharge and awaiting placement, nutrition-related POC per MD.  Will continue with maximum PO encouragement, unless otherwise directed.     MALNUTRITION   % Weight Loss: Unable to determine, see below  % Intake:  <75% for > 7 days (moderate malnutrition)  Subcutaneous Fat Loss:  Orbital region moderate depletion and Upper arm region moderate to severe depletion --> from doorway, still on COVID precautions  Muscle Loss:  Temporal region moderate to severe depletion, Clavicle bone region moderate to severe --> from doorway, still on COVID precautions, would use as indicator at this time despite advanced age given prolonged acute illness in combination with poor PO intakes  Fluid Retention: None documented    Malnutrition Diagnosis: At least moderate malnutrition   In Context of:  Acute illness or injury with underlying chronic illness or disease         EVALUATION OF PROGRESS TOWARD GOALS   Diet: Regular, 4 oz Ensure offerings w/ meals TID    Intake/Tolerance:  Weaned to room air, oriented to self, set to be off COVID precautions ?today.  Patient is medically ready for discharge and awaiting placement.  Ongoing poor appetite, 0-100% intakes since admission and w/in the past week.  Is consistently receiving meals.  Likely continues to meet <75% needs orally, supplement contribution unclear at this point (can't tell if drinking consistently, discussed w/ RN who will help to monitor at meals today).    Barriers to PO intakes including: Suspect mentation, weakness, poor appetite.        ASSESSED NUTRITION NEEDS PER APPROVED PRACTICE GUIDELINES:     Dosing Weight 54  kg - accuracy?  Estimated Energy Needs: 25-30+ Kcal/Kg  Justification: minimum maintenance  Estimated Protein Needs: 1-1.2+ g pro/Kg  Justification: preservation of lean body mass, advanced age  Estimated Fluid Needs: per MD      NEW FINDINGS:   - Medications reviewed.  - Labs reviewed.  - Stooling patterns reviewed.  -  Weight trending reviewed and given use of bed scale, unclear accuracy as do not suspect possible to lose 7% of body wt w/in period of weeks, has not been diuresed, and no edema documented.  Certainly is at risk for true wt loss based on PO trends and overall deconditioning, but cannot tell to what degree:  Vitals:    01/23/23 2151 01/27/23 0503 01/31/23 0614 02/05/23 0643   Weight: 59.4 kg (131 lb) 60.2 kg (132 lb 11.2 oz) 57.9 kg (127 lb 9.6 oz) 54.4 kg (120 lb)    02/06/23 0533   Weight: 53.6 kg (118 lb 3.2 oz)       Previous Goals:   Patient to consume at least 50% of meals/supplements TID to show improvement in PO intakes.  Evaluation: Not met consistently    Previous Nutrition Diagnosis:   Inadequate oral intake related to suspect decreased appetite, mentation, lethargy/ROBE at times, weakness as evidenced by meeting <50% nutrition needs daily since admission.  Evaluation: No change      CURRENT NUTRITION DIAGNOSIS  Inadequate oral intake related to suspect decreased appetite, mentation, weakness as evidenced by meeting <75% nutrition needs daily since admission, possible wt loss, and malnutrition criteria met w/ combination overt muscle loss.    INTERVENTIONS  Recommendations / Nutrition Prescription  See above.    Implementation  Collaboration and Referral of Nutrition care: Discussed POC with team during rounds, PO intakes/supplement use with RN.  Per discussion with RN who had patient over the weekend as well, she requires much encouragement at meal times.    Goals  Patient to consistently consume at least 50% of meals or supplements TID while remains acutely admitted.      MONITORING AND  EVALUATION:  Progress towards goals will be monitored and evaluated per protocol and Practice Guidelines      Lizzy Hilliard RDN, VERO  Clinical Dietitian  3rd floor/ICU: [phone]  All other floors: [phone]  Weekend/holiday: [phone]  Office: [phone]

## 2023-02-06 NOTE — PLAN OF CARE
Goal Outcome Evaluation:Assumed pt care [id number].  VSS, afebrile and RA sats low 90's.  Pt has pain in her back, she is on scheduled Tylenol and states it does help.  Pt c/o needing to have a BM, last recorded BM on 2/2/23.   Pt declined a stool softener and did not want to get up on the commode.   Is on Scheduled Sennakot.  Did agree to drink some apple jucie, results pending.   POC reviewed with pt, questions answered.      Plan of Care Reviewed With: patient

## 2023-02-06 NOTE — PLAN OF CARE
Goal Outcome Evaluation:    Vital signs:  Temp: 97.3  F (36.3  C) Temp src: Axillary BP: (!) 148/45 Pulse: 60   Resp: 18 SpO2: 96 % O2 Device: None (Room air)     To Do:  End of Shift Summary  For vital signs and complete assessments, please see documentation flowsheets.     Pertinent assessments: Alert , disoriented to place and situation. Up assist of one with G/walker. VSS, on RA, no indication of sob, pain management with scheduled tylenol , tolerates diet with fair appetite. Alarms in use for safety.      Major Shift Events . 2L of NS Bolus given, CT of head completed refer to chart for result.      Treatment Plan: pain management, BS monitoring, Decadron , placement pending, continue with POC.      Bedside Nurse: Haylie Madrigal RN

## 2023-02-06 NOTE — PLAN OF CARE
Goal Outcome Evaluation:      Plan of Care Reviewed With: other (see comments)      Problem: Oral Intake Inadequate  Goal: Improved Oral Intake  Outcome: Not Progressing     Ongoing decreased appetite/intake, see other note.

## 2023-02-06 NOTE — PROGRESS NOTES
"    Brief Hospitalist Cross cover note    Reason for the call : Initial CODE BLUE which was changed into RRT and Code stroke      Reason for admission : Acute hypoxic respiratory failure, aspiration event, suspected aspiration pneumonitis    CurrentSignificant findings or change:     Patient was sitting on commode with blood pressure of 70 systolic when I arrived.  She  was alert and oriented.  Nursing team concerned about facial deviation to the right.  Patient was able to move extremities.  Her face is slightly deviated to the right and she has word finding difficulty.    Pupils are equal bilaterally.  Minimal facial deviation to the right.  Lungs are clear.  Regular rate and rhythm.    Neurologic exam showed generalized decreased strength bilaterally but she was able to move all extremities.  No sensory deficit noted.        Vital signs : /51 (BP Location: Right arm)   Pulse (!) 49   Temp 97.5  F (36.4  C) (Axillary)   Resp 18   Ht 1.549 m (5' 1\")   Wt 53.6 kg (118 lb 3.2 oz)   SpO2 97%   BMI 22.33 kg/m           Actions taken:    Patient was urgently seen.  She was placed on the bed and IV fluid bolus ordered.  BMP, CBC, troponin, lactic acid ordered.  Care discussed with stroke neurology team.  Repeat blood pressure is 114 systolic.    Further stroke evaluation imagings ordered    EMR reviewed       Assessment:    Syncope    Hypertension    Right facial deviation concern for acute CVA    Recommendation/Plan:    IV fluid bolus    Follow-up pending labs    Stroke evaluation in process    We will sign out to attending physician    Critical Care time: was 45 minutes for this patient excluding procedures.    "

## 2023-02-06 NOTE — PLAN OF CARE
End of Shift Summary  For vital signs and complete assessments, please see documentation flowsheets.     Pertinent assessments: VSS. Afebrile. LS clear. BS hypoactive, pt is constipated. Pt denies nausea. Reported pain in back and legs, treated with PO Narco x1. Regular diet. Ax1 with Belt and Walker to ambulate. PIV - SL. Pt was quite anxious and restless tonight, reassurance provided. Incontinent of urine. BG was 111. Pt is confused, only orientated to self; alarm on bed for safety. Slept on/off.     Major Shift Events: none    Treatment Plan: Decadron, Lovenox, Encourage activity, Discharge TBD.

## 2023-02-06 NOTE — PROGRESS NOTES
Care Management Follow Up    Length of Stay (days): 14    Expected Discharge Date: 02/05/2023     Concerns to be Addressed:       Patient plan of care discussed at interdisciplinary rounds: Yes    Anticipated Discharge Disposition: Transitional Care vs return to [facility]     Anticipated Discharge Services:    Anticipated Discharge DME:      Patient/family educated on Medicare website which has current facility and service quality ratings:    Education Provided on the Discharge Plan:    Patient/Family in Agreement with the Plan: yes    Referrals Placed by CM/SW:  TCU  Private pay costs discussed: Not applicable    Additional Information:  Able to connect with Erlinda at [facility] [phone] regarding discharge planning. She had some concerns over patient return as she is off her baseline mobility and care level. At baseline she was able to get her self to meals and needed minimal assist with ADLs, she was continent of urine with SBA to the [facility]. They provided medication management. Her mental status at her facility showed forgetfulness but had done relatively well on her last cognitive eval in December.  Erlinda felt that she is not at her baseline but they could have her return if she was consistently a one person and walker assist, they could increase assistance with ADLs but would need to be safe with every 2 hour safety checks. They do not have the ability to safety check hourly.    During our phone conversation patient had a change in condition requiring an RRT and code stroke be called. Will continue to stay in communication with [facility] but dependent on current change in condition discharge disposition may need to be changed.    Will continue to follow for discharge planning.    Clarice Morgan RN BSN OCN  Care Coordinator  [facility]  [phone]

## 2023-02-06 NOTE — PROGRESS NOTES
[facility]    Hospitalist Progress Note             Date of Admission:  1/23/2023                   Day of hospitalization: 14    Assessment and Plan:   Daya Ignacio is a 89 year old female with known prior history of hypertension, prior C. difficile colitis, chronic pain on chronic narcotics, multiple compression fractures in the past, ESBL UTI, anxiety, depression, cognitive impairment and reportedly lives in an assisted living facility set up and unfortunately presented to the emergency room by emergency personnel after she was found having jerking-like sensation and concerns for aspiration while partaking her dinner food tray.  During EMS evaluation she was found to be hypoxic at 61% via room air but were able to increase to close to 90% utilizing 15 L of oxygen en route.     Afternoon of 1/24/2023 she had worsening mental status, which culminated in vomiting.  She was found to be COVID-positive     Syncopal episode:  Patient was on the toilet/commode having a bowel movement when she suddenly became diaphoretic and slumped over was noted to be profoundly hypotensive.  Lactic acid was profoundly elevated greater than 4.5 was given 2 L of normal saline bolus but declining levels of lactic acid  Do suspect a component of dehydration and poor oral intake with labile hypertension being the cause of these episodes    Leukocytosis:  Unclear etiology could be stress related versus real infection  Also has been on steroids that could be lending it to the elevation.  Will check a UA to ensure there is no evidence of UTI  Will recheck labs in the morning    Acute respiratory failure with hypoxia (H)  Acute COVID-19 infection with possible underlying COVID-19 pneumonia with hypoxia   -Subsequent procalcitonin low risk   -COVID-19 screen and influenza screen pursued due to fever spike last 1/26/2023   -Started on remdesivir and Decadron last dose 2/6/2023  -Finished remdesivir  -Leukocytosis  resolved  -Continue to wean and titrate oxygen on Room air.  Currently on room air  -Finish oral antibiotics course for isolated sensitive E. coli with UTI.   -Done with antibiotics   -COVID-19 isolation precautions    -Blood cultures remain no growth up-to-date    Infectious encephalopathy-improving  -Earlier showing alteration in mental state.  Received 2 doses of 25 mg Seroquel  - Seroquel discontinued as patient became very sedated and lethargic.  Continue to hold any further anxiolytics  - No obvious seizure-like activity  - Reassuring CT of the head    HTN  -Now with increasing blood pressure levels, will resume all of her oral home regimen antihypertensive  - on Clonidine .2 mg at bedtime (increased February 5), coreg 12.5 in AM, 25 mg in PM, isosorbide dinatrate 10 mg TID, Lisinopril 30 mg  However given today's episode will have to hold off on several of these medications because she is demonstrating bradycardic episodes.  Will change clonidine to 0.1 mg 3 times daily with parameters in place    Chronic pain on chronic narcotics  -I reviewed her electronic medical records, home regimen and she is able to tolerate narcotics and opioids in the past.  - continued on her home regimen of hydrocodone given worsening mental status changes.     Physical deconditioning  -Appreciate input from PT, OT likely will be needing TCU placement     Stable acute on chronic anemia likely of chronic illness  -Most likely worsening anemia from frequent blood draws  -No bleeding tendencies here  -Does not need packed RBC transfusion yet        ---------     # Code status: DNR/DNI  # Anticipated discharge date and Disposition: needs TCU, medically ready, discussed with social work  # DVT: Lovenox  # IVF: None                       Lexie Sims MD  Text Page (7am - 6pm, M-F)               Subjective   Patient was doing fine this morning and however she developed a syncopal episode when she was on the toilet with significant  "decline in her blood pressure  There was no loss of consciousness or seizure activity but there was concerns for stroke, stroke code was called stroke neurology was involved and head CT was unremarkable for any acute findings however her lactic acid was elevated and she was given 2 L of normal saline bolus with significant improvement in mentation, blood pressure as well as lactic acid  Repeat head CT has been ordered for tomorrow morning        Objective   BP (!) 148/45 (BP Location: Left arm)   Pulse 60   Temp 97.3  F (36.3  C) (Axillary)   Resp 18   Ht 1.549 m (5' 1\")   Wt 53.6 kg (118 lb 3.2 oz)   SpO2 96%   BMI 22.33 kg/m       Physical Exam  General: Pt in NAD, normal appearance  HEENT: OP clear MMM, no JVD  Lungs: Clear to Auscultation Bilateral, normal breathing  without accessory muscle usage, no wheezing, rhonchi or crackles  Cardiac: +S1, S2, RRR, no MRG, no edema  Abdominal: normal bowel sounds, NT/ND, no hepatosplenomegaly  Skin: warm, dry, normal turgor, no rash  Psyche: A& O x3, appropriate affect             Intake/Output Summary (Last 24 hours) at 2/6/2023 1605  Last data filed at 2/6/2023 1006  Gross per 24 hour   Intake 600 ml   Output 500 ml   Net 100 ml               Labs and Imaging Results:      Recent Labs   Lab 02/06/23  1134 02/06/23  0721   WBC 22.2*  --    HGB 11.2*  --     240        Recent Labs   Lab 02/06/23  1134   *   CO2 20*   BUN 28.9*        Micro:     Radio:  CT Head Perfusion w Contrast   Preliminary Result   IMPRESSION: Normal CT perfusion of the brain.         Radiation dose for this scan was reduced using automated exposure   control, adjustment of the mA and/or kV according to patient size, or   iterative reconstruction technique      CTA Head Neck w Contrast   Preliminary Result   IMPRESSION:   1. Plaque without stenosis at the origins of the internal carotid   arteries bilaterally.   2. Otherwise, normal head and neck CTA.         Radiation dose for " this scan was reduced using automated exposure   control, adjustment of the mA and/or kV according to patient size, or   iterative reconstruction technique      CT Head w/o Contrast   Preliminary Result   IMPRESSION: Diffuse cerebral volume loss and cerebral white matter   changes consistent with chronic small vessel ischemic disease. No   evidence for acute intracranial pathology.         Radiation dose for this scan was reduced using automated exposure   control, adjustment of the mA and/or kV according to patient size, or   iterative reconstruction technique      XR Chest Port 1 View   Final Result   IMPRESSION: Stable mild bibasilar retrocardiac atelectasis/pneumonia.   No pleural effusion or pneumothorax. Normal heart size.      HATTIE DEVINE MD            SYSTEM ID:  [id number]      CT Head w/o Contrast   Final Result   IMPRESSION:   1.  No CT evidence for acute intracranial process.   2.  Brain atrophy and presumed chronic microvascular ischemic changes, mildly progressed since 2019.      XR Video Swallow with SLP or OT   Final Result   IMPRESSION:   1. Penetration with thin liquids. No aspiration identified.   2. Please refer to the speech pathology report for further details.      This study includes only the cervical esophagus.      DINORAH OS MD            SYSTEM ID:  [id number]      XR Chest Port 1 View   Final Result   IMPRESSION: Fine linear opacities radiating from the elham into the periphery of both lungs have developed consistent with mild interstitial edema. Heart size and pulmonary vascularity upper normal. Bones are demineralized.              Medications:      Scheduled Meds:      sodium chloride 0.9%  1,000 mL Intravenous Once     sodium chloride 0.9%  1,000 mL Intravenous Once     acetaminophen  650 mg Oral TID     amLODIPine  10 mg Oral Daily     [Held by provider] carvedilol  12.5 mg Oral QAM     [Held by provider] carvedilol  25 mg Oral At Bedtime     cloNIDine  0.2 mg Oral At Bedtime      dexamethasone  6 mg Oral Daily     enoxaparin ANTICOAGULANT  40 mg Subcutaneous Q24H     gabapentin  300 mg Oral TID     hydrOXYzine  5 mg Oral BID     insulin aspart  1-3 Units Subcutaneous TID AC     isosorbide dinitrate  10 mg Oral TID     lisinopril  30 mg Oral Daily     miconazole   Topical BID     pantoprazole  40 mg Oral QAM AC     saccharomyces boulardii  250 mg Oral Daily     senna-docusate  1 tablet Oral At Bedtime     sertraline  100 mg Oral Daily     sodium chloride (PF)  3 mL Intracatheter Q8H     Continuous Infusions:    PRN Meds:  albuterol, glucose **OR** dextrose **OR** glucagon, hydrALAZINE, HYDROcodone-acetaminophen, ipratropium - albuterol 0.5 mg/2.5 mg/3 mL, lidocaine 4%, lidocaine (buffered or not buffered), melatonin, naloxone **OR** naloxone **OR** naloxone **OR** naloxone, ondansetron **OR** ondansetron, polyethylene glycol, senna-docusate **OR** senna-docusate, sodium chloride (PF)

## 2023-02-07 ENCOUNTER — APPOINTMENT (OUTPATIENT)
Dept: CT IMAGING | Facility: CLINIC | Age: 88
DRG: 177 | End: 2023-02-07
Attending: INTERNAL MEDICINE
Payer: COMMERCIAL

## 2023-02-07 LAB
GLUCOSE BLDC GLUCOMTR-MCNC: 136 MG/DL (ref 70–99)
GLUCOSE BLDC GLUCOMTR-MCNC: 177 MG/DL (ref 70–99)
GLUCOSE BLDC GLUCOMTR-MCNC: 87 MG/DL (ref 70–99)

## 2023-02-07 PROCEDURE — 250N000011 HC RX IP 250 OP 636: Performed by: INTERNAL MEDICINE

## 2023-02-07 PROCEDURE — 99233 SBSQ HOSP IP/OBS HIGH 50: CPT | Mod: CS | Performed by: HOSPITALIST

## 2023-02-07 PROCEDURE — 99207 PR NO BILLABLE SERVICE THIS VISIT: CPT | Performed by: NURSE PRACTITIONER

## 2023-02-07 PROCEDURE — 250N000013 HC RX MED GY IP 250 OP 250 PS 637: Performed by: INTERNAL MEDICINE

## 2023-02-07 PROCEDURE — 250N000013 HC RX MED GY IP 250 OP 250 PS 637: Performed by: HOSPITALIST

## 2023-02-07 PROCEDURE — 70450 CT HEAD/BRAIN W/O DYE: CPT

## 2023-02-07 PROCEDURE — 250N000013 HC RX MED GY IP 250 OP 250 PS 637: Performed by: FAMILY MEDICINE

## 2023-02-07 PROCEDURE — 120N000001 HC R&B MED SURG/OB

## 2023-02-07 PROCEDURE — 250N000012 HC RX MED GY IP 250 OP 636 PS 637: Performed by: INTERNAL MEDICINE

## 2023-02-07 RX ADMIN — LISINOPRIL 30 MG: 20 TABLET ORAL at 08:37

## 2023-02-07 RX ADMIN — CLONIDINE HYDROCHLORIDE 0.1 MG: 0.1 TABLET ORAL at 17:00

## 2023-02-07 RX ADMIN — HYDROXYZINE HYDROCHLORIDE 5 MG: 10 TABLET ORAL at 21:50

## 2023-02-07 RX ADMIN — ACETAMINOPHEN 650 MG: 325 TABLET, FILM COATED ORAL at 21:49

## 2023-02-07 RX ADMIN — PANTOPRAZOLE SODIUM 40 MG: 40 TABLET, DELAYED RELEASE ORAL at 08:36

## 2023-02-07 RX ADMIN — CLONIDINE HYDROCHLORIDE 0.1 MG: 0.1 TABLET ORAL at 21:49

## 2023-02-07 RX ADMIN — GABAPENTIN 300 MG: 300 CAPSULE ORAL at 08:36

## 2023-02-07 RX ADMIN — SENNOSIDES AND DOCUSATE SODIUM 1 TABLET: 50; 8.6 TABLET ORAL at 21:50

## 2023-02-07 RX ADMIN — ISOSORBIDE DINITRATE 10 MG: 10 TABLET ORAL at 17:00

## 2023-02-07 RX ADMIN — HYDROXYZINE HYDROCHLORIDE 5 MG: 10 TABLET ORAL at 08:37

## 2023-02-07 RX ADMIN — SERTRALINE HYDROCHLORIDE 100 MG: 100 TABLET ORAL at 08:37

## 2023-02-07 RX ADMIN — DEXAMETHASONE 6 MG: 2 TABLET ORAL at 08:36

## 2023-02-07 RX ADMIN — ISOSORBIDE DINITRATE 10 MG: 10 TABLET ORAL at 08:37

## 2023-02-07 RX ADMIN — MICONAZOLE NITRATE: 20 POWDER TOPICAL at 08:46

## 2023-02-07 RX ADMIN — AMLODIPINE BESYLATE 10 MG: 10 TABLET ORAL at 08:37

## 2023-02-07 RX ADMIN — ACETAMINOPHEN 650 MG: 325 TABLET, FILM COATED ORAL at 08:37

## 2023-02-07 RX ADMIN — ENOXAPARIN SODIUM 40 MG: 40 INJECTION SUBCUTANEOUS at 12:33

## 2023-02-07 RX ADMIN — Medication 250 MG: at 08:36

## 2023-02-07 RX ADMIN — GABAPENTIN 300 MG: 300 CAPSULE ORAL at 17:00

## 2023-02-07 RX ADMIN — GABAPENTIN 300 MG: 300 CAPSULE ORAL at 21:50

## 2023-02-07 RX ADMIN — CLONIDINE HYDROCHLORIDE 0.1 MG: 0.1 TABLET ORAL at 08:36

## 2023-02-07 RX ADMIN — ISOSORBIDE DINITRATE 10 MG: 10 TABLET ORAL at 21:49

## 2023-02-07 RX ADMIN — MICONAZOLE NITRATE: 20 POWDER TOPICAL at 21:50

## 2023-02-07 RX ADMIN — INSULIN ASPART 1 UNITS: 100 INJECTION, SOLUTION INTRAVENOUS; SUBCUTANEOUS at 17:01

## 2023-02-07 RX ADMIN — ACETAMINOPHEN 650 MG: 325 TABLET, FILM COATED ORAL at 17:00

## 2023-02-07 ASSESSMENT — ACTIVITIES OF DAILY LIVING (ADL)
ADLS_ACUITY_SCORE: 47

## 2023-02-07 NOTE — PLAN OF CARE
Pertinent assessments: Pt A&Ox4, VSS on RA. Ax1 with belt and walker LS clear. Pt denies nausea. PIV - SL. alarm on bed for safety. Slept well.     Major Shift Events: CT of head done this morning.     Treatment Plan: Decadron, Lovenox, Encourage activity, Discharge TBD.     Bedside Nurse: Kim Vera RN

## 2023-02-07 NOTE — PROGRESS NOTES
[facility]    Hospitalist Progress Note    Date of Service (when I saw the patient): 02/07/2023  Provider:  Jhoan Baltazar MD   Text Page  7am - 6PM       Assessment & Plan   Daya Ignacio is a 89 year old female with known prior history of hypertension, prior C. difficile colitis, chronic pain on chronic narcotics, multiple compression fractures in the past, ESBL UTI, anxiety, depression, cognitive impairment and reportedly lives in an assisted living facility set up and unfortunately presented to the emergency room by emergency personnel after she was found having jerking-like sensation and concerns for aspiration while partaking her dinner food tray.  During EMS evaluation she was found to be hypoxic at 61% via room air but were able to increase to close to 90% utilizing 15 L of oxygen en route.     Afternoon of 1/24/2023 she had worsening mental status, which culminated in vomiting.  She was found to be COVID-positive     Syncopal episode:  Patient was on the toilet/commode having a bowel movement when she suddenly became diaphoretic and slumped overm she was noted to be profoundly hypotensive.  Lactic acid was profoundly elevated greater than 4.5 was given 2 L of normal saline bolus but declining levels of lactic acid  Do suspect a component of dehydration and poor oral intake with labile hypertension being the cause of these episodes     Leukocytosis:  Unclear etiology could be stress related versus real infection  Also has been on steroids that could be lending this elevation.  UA is normal, no evidence of UTI  Will recheck CBC and BMP in the morning     Acute respiratory failure with hypoxia (H)  Acute COVID-19 infection with possible underlying COVID-19 pneumonia with hypoxia   -Subsequent procalcitonin low risk   -COVID-19 screen and influenza screen pursued due to fever spike last 1/26/2023   -Started on remdesivir and Decadron last dose 2/6/2023  -Finished remdesivir  -Continue  to wean and titrate oxygen on Room air.  Currently on room air  -Finish oral antibiotics course for isolated sensitive E. coli with UTI.     -Done with antibiotics   -COVID-19 isolation precautions    -Blood cultures remain no growth up-to-date     Infectious encephalopathy-improving  -Earlier showing alteration in mental state.  Received 2 doses of 25 mg Seroquel  - Seroquel discontinued as patient became very sedated and lethargic.  Continue to hold any further anxiolytics  - No obvious seizure-like activity  - Reassuring CT of the head     HTN  -Now with increasing blood pressure levels, will resume all of her oral home regimen antihypertensive  - on Clonidine .2 mg at bedtime (increased February 5), coreg 12.5 in AM, 25 mg in PM, isosorbide dinatrate 10 mg TID, Lisinopril 30 mg  However given today's episode will have to hold off on several of these medications because she is demonstrating bradycardic episodes.  Will change clonidine to 0.1 mg 3 times daily with parameters in place     Chronic pain on chronic narcotics  -I reviewed her electronic medical records, home regimen and she is able to tolerate narcotics and opioids in the past.  - continued on her home regimen of hydrocodone given worsening mental status changes.     Physical deconditioning  -Appreciate input from PT, OT likely will be needing TCU placement     Stable acute on chronic anemia likely of chronic illness  -Most likely worsening anemia from frequent blood draws  -No bleeding tendencies here  -Does not need packed RBC transfusion yet     DVT Prophylaxis: Enoxaparin (Lovenox) SQ  Code Status: No CPR- Do NOT Intubate    Disposition: Expected discharge, needs TCU, medically ready, discussed with social work     .   Interval History   Patient appears weak and frail with minimal interaction.  She responds yes or not when asked about pain or shortness of breath.    -Data reviewed today: I reviewed all new labs and imaging results over the last 24  hours.      Physical Exam   Temp: 98.3  F (36.8  C) Temp src: Oral BP: (Abnormal) 170/67 Pulse: 58   Resp: 20 SpO2: 97 % O2 Device: None (Room air)    Vitals:    02/05/23 0643 02/06/23 0533 02/07/23 0439   Weight: 54.4 kg (120 lb) 53.6 kg (118 lb 3.2 oz) 56.9 kg (125 lb 6.4 oz)     Vital Signs with Ranges  Temp:  [98  F (36.7  C)-98.3  F (36.8  C)] 98.3  F (36.8  C)  Pulse:  [58-62] 58  Resp:  [20-21] 20  BP: (128-170)/(45-67) 170/67  SpO2:  [94 %-97 %] 97 %  I/O last 3 completed shifts:  In: 575 [P.O.:540; I.V.:35]  Out: 1300 [Urine:1300]    GEN: Frail aspect.  Somnolent but awakable, appears comfortable, NAD.  HEENT:  Normocephalic/atraumatic, no scleral icterus, no nasal discharge, mouth moist.  CV:  Regular rate and rhythm, no murmur or JVD.  S1 + S2 noted, no S3 or S4.  LUNGS:  Clear to auscultation bilaterally without rales/rhonchi/wheezing/retractions.  Symmetric chest rise on inhalation noted.  ABD:  Active bowel sounds, soft, non-tender/non-distended.  No rebound/guarding/rigidity.  EXT:  No edema or cyanosis.  No joint synovitis noted.  SKIN:  Dry to touch, no exanthems noted in the visualized areas.       Medications       acetaminophen  650 mg Oral TID     amLODIPine  10 mg Oral Daily     [Held by provider] carvedilol  12.5 mg Oral QAM     [Held by provider] carvedilol  25 mg Oral At Bedtime     cloNIDine  0.1 mg Oral TID     dexamethasone  6 mg Oral Daily     enoxaparin ANTICOAGULANT  40 mg Subcutaneous Q24H     gabapentin  300 mg Oral TID     hydrOXYzine  5 mg Oral BID     insulin aspart  1-3 Units Subcutaneous TID AC     isosorbide dinitrate  10 mg Oral TID     lisinopril  30 mg Oral Daily     miconazole   Topical BID     pantoprazole  40 mg Oral QAM AC     saccharomyces boulardii  250 mg Oral Daily     senna-docusate  1 tablet Oral At Bedtime     sertraline  100 mg Oral Daily     sodium chloride (PF)  3 mL Intracatheter Q8H       Data   Recent Labs   Lab 02/07/23  1225 02/07/23  0857 02/06/23  222  02/06/23  1234 02/06/23  1134 02/06/23  0929 02/06/23  0721 02/04/23  0822 02/04/23  0758 02/03/23  0841 02/03/23  0748   WBC  --   --   --   --  22.2*  --   --   --   --   --   --    HGB  --   --   --   --  11.2*  --   --   --   --   --   --    MCV  --   --   --   --  100  --   --   --   --   --   --    PLT  --   --   --   --  379  --  240  --   --   --  297   NA  --   --   --   --  132*  --   --   --   --   --   --    POTASSIUM  --   --   --   --  4.4  --  4.2  --  4.5  --   --    CHLORIDE  --   --   --   --  97*  --   --   --   --   --   --    CO2  --   --   --   --  20*  --   --   --   --   --   --    BUN  --   --   --   --  28.9*  --   --   --   --   --   --    CR  --   --   --   --  0.66  --   --   --   --   --   --    ANIONGAP  --   --   --   --  15  --   --   --   --   --   --    DAVE  --   --   --   --  9.6  --   --   --   --   --   --    * 87 148*   < > 136*   < >  --    < >  --    < >  --     < > = values in this interval not displayed.       Recent Results (from the past 24 hour(s))   CT Head w/o Contrast    Narrative    CT OF THE HEAD WITHOUT CONTRAST   2/7/2023 8:19 AM     COMPARISON: Head CT 2/6/2023    HISTORY:  Altered mental status.     TECHNIQUE:  Axial CT images of the head from the skull base to the  vertex were acquired without IV contrast.    FINDINGS:   INTRACRANIAL CONTENTS: No intracranial hemorrhage, extraaxial  collection, or mass effect.  No CT evidence of acute infarct.   Mild  to moderate presumed chronic small vessel ischemic change with mild to  moderate generalized volume loss.    VISUALIZED ORBITS/SINUSES/MASTOIDS: No significant orbital  abnormality.  No significant paranasal sinus mucosal disease. No  significant middle ear or mastoid effusion.    OSSEOUS STRUCTURES/SOFT TISSUES: No significant abnormality.      Impression    IMPRESSION:  1.  Mild/moderate chronic small vessel ischemic change and age-related  change.  2.  No interval change.    Radiation dose for this scan  was reduced using automated exposure  control, adjustment of the mA and/or kV according to patient size, or  iterative reconstruction technique    RICKEY COMBS MD         SYSTEM ID:  [id number]         Securely message with the Vocera Web Console (learn more here)  Text page via [facility] Paging/Directory        Disclaimer: This note consists of symbols derived from keyboarding, dictation and/or voice recognition software. As a result, there may be errors in the script that have gone undetected. Please consider this when interpreting information found in this chart.

## 2023-02-07 NOTE — PROGRESS NOTES
Interval Stroke Note    Daya Ignacio is a 89 year old female with a PMHx significant for HTN, anemia, chronic pain on chronic narcotics, ESBL UTI, anxiety, depression, and cognitive impairment who was admitted on 1/23 after an aspiration event. The day after admission she had worsening mental status and vomiting. Work up revealed COVID and UTI. She has finished a course of remdesivir and antibiotics and continues on decadron. She is awaiting TCU placement.   On 2/6, while working with therapy, she became acutely dizzy and had new facial weakness. Her BP was 70/34, pulse 39-45. Lactate 4.7. Her symptoms improved after returning to the floor from CT and gentle fluid resuscitation. Her CT/CTA/CTP were unremarkable. She is unable to tolerate MRI so repeat CT was done this morning to look for interval stroke development; there were no interval changes. No further stroke work up recommended. Stroke service will sign off.     Isabel Real NP

## 2023-02-08 ENCOUNTER — APPOINTMENT (OUTPATIENT)
Dept: PHYSICAL THERAPY | Facility: CLINIC | Age: 88
DRG: 177 | End: 2023-02-08
Payer: COMMERCIAL

## 2023-02-08 ENCOUNTER — APPOINTMENT (OUTPATIENT)
Dept: GENERAL RADIOLOGY | Facility: CLINIC | Age: 88
DRG: 177 | End: 2023-02-08
Attending: HOSPITALIST
Payer: COMMERCIAL

## 2023-02-08 LAB
ANION GAP SERPL CALCULATED.3IONS-SCNC: 8 MMOL/L (ref 7–15)
BASOPHILS # BLD AUTO: 0 10E3/UL (ref 0–0.2)
BASOPHILS NFR BLD AUTO: 0 %
BUN SERPL-MCNC: 15.3 MG/DL (ref 8–23)
CALCIUM SERPL-MCNC: 9.2 MG/DL (ref 8.2–9.6)
CHLORIDE SERPL-SCNC: 97 MMOL/L (ref 98–107)
CREAT SERPL-MCNC: 0.47 MG/DL (ref 0.51–0.95)
DACRYOCYTES BLD QL SMEAR: SLIGHT
DEPRECATED HCO3 PLAS-SCNC: 23 MMOL/L (ref 22–29)
ELLIPTOCYTES BLD QL SMEAR: SLIGHT
EOSINOPHIL # BLD AUTO: 0.1 10E3/UL (ref 0–0.7)
EOSINOPHIL NFR BLD AUTO: 1 %
ERYTHROCYTE [DISTWIDTH] IN BLOOD BY AUTOMATED COUNT: 15.5 % (ref 10–15)
GFR SERPL CREATININE-BSD FRML MDRD: 89 ML/MIN/1.73M2
GLUCOSE BLDC GLUCOMTR-MCNC: 103 MG/DL (ref 70–99)
GLUCOSE BLDC GLUCOMTR-MCNC: 138 MG/DL (ref 70–99)
GLUCOSE BLDC GLUCOMTR-MCNC: 159 MG/DL (ref 70–99)
GLUCOSE BLDC GLUCOMTR-MCNC: 160 MG/DL (ref 70–99)
GLUCOSE BLDC GLUCOMTR-MCNC: 78 MG/DL (ref 70–99)
GLUCOSE SERPL-MCNC: 79 MG/DL (ref 70–99)
HCT VFR BLD AUTO: 29.8 % (ref 35–47)
HGB BLD-MCNC: 9.8 G/DL (ref 11.7–15.7)
IMM GRANULOCYTES # BLD: 0.3 10E3/UL
IMM GRANULOCYTES NFR BLD: 4 %
LYMPHOCYTES # BLD AUTO: 2.3 10E3/UL (ref 0.8–5.3)
LYMPHOCYTES NFR BLD AUTO: 27 %
MCH RBC QN AUTO: 33.1 PG (ref 26.5–33)
MCHC RBC AUTO-ENTMCNC: 32.9 G/DL (ref 31.5–36.5)
MCV RBC AUTO: 101 FL (ref 78–100)
MONOCYTES # BLD AUTO: 0.9 10E3/UL (ref 0–1.3)
MONOCYTES NFR BLD AUTO: 11 %
NEUTROPHILS # BLD AUTO: 5 10E3/UL (ref 1.6–8.3)
NEUTROPHILS NFR BLD AUTO: 57 %
NRBC # BLD AUTO: 0 10E3/UL
NRBC BLD AUTO-RTO: 0 /100
PLAT MORPH BLD: ABNORMAL
PLATELET # BLD AUTO: 192 10E3/UL (ref 150–450)
POTASSIUM SERPL-SCNC: 4.1 MMOL/L (ref 3.4–5.3)
PROCALCITONIN SERPL IA-MCNC: 0.03 NG/ML
RBC # BLD AUTO: 2.96 10E6/UL (ref 3.8–5.2)
RBC MORPH BLD: ABNORMAL
SODIUM SERPL-SCNC: 128 MMOL/L (ref 136–145)
WBC # BLD AUTO: 8.7 10E3/UL (ref 4–11)

## 2023-02-08 PROCEDURE — 250N000011 HC RX IP 250 OP 636: Performed by: INTERNAL MEDICINE

## 2023-02-08 PROCEDURE — 99233 SBSQ HOSP IP/OBS HIGH 50: CPT | Mod: CS | Performed by: HOSPITALIST

## 2023-02-08 PROCEDURE — 250N000013 HC RX MED GY IP 250 OP 250 PS 637: Performed by: HOSPITALIST

## 2023-02-08 PROCEDURE — 74018 RADEX ABDOMEN 1 VIEW: CPT

## 2023-02-08 PROCEDURE — 250N000012 HC RX MED GY IP 250 OP 636 PS 637: Performed by: INTERNAL MEDICINE

## 2023-02-08 PROCEDURE — 84145 PROCALCITONIN (PCT): CPT | Performed by: HOSPITALIST

## 2023-02-08 PROCEDURE — 80048 BASIC METABOLIC PNL TOTAL CA: CPT | Performed by: HOSPITALIST

## 2023-02-08 PROCEDURE — 250N000013 HC RX MED GY IP 250 OP 250 PS 637: Performed by: INTERNAL MEDICINE

## 2023-02-08 PROCEDURE — 250N000013 HC RX MED GY IP 250 OP 250 PS 637: Performed by: FAMILY MEDICINE

## 2023-02-08 PROCEDURE — 36415 COLL VENOUS BLD VENIPUNCTURE: CPT | Performed by: HOSPITALIST

## 2023-02-08 PROCEDURE — 97530 THERAPEUTIC ACTIVITIES: CPT | Mod: GP

## 2023-02-08 PROCEDURE — 120N000001 HC R&B MED SURG/OB

## 2023-02-08 PROCEDURE — 85004 AUTOMATED DIFF WBC COUNT: CPT | Performed by: HOSPITALIST

## 2023-02-08 RX ORDER — HYDROMORPHONE HCL IN WATER/PF 6 MG/30 ML
0.2 PATIENT CONTROLLED ANALGESIA SYRINGE INTRAVENOUS ONCE
Status: COMPLETED | OUTPATIENT
Start: 2023-02-08 | End: 2023-02-08

## 2023-02-08 RX ADMIN — SERTRALINE HYDROCHLORIDE 100 MG: 100 TABLET ORAL at 09:13

## 2023-02-08 RX ADMIN — MICONAZOLE NITRATE: 20 POWDER TOPICAL at 09:21

## 2023-02-08 RX ADMIN — CLONIDINE HYDROCHLORIDE 0.1 MG: 0.1 TABLET ORAL at 21:55

## 2023-02-08 RX ADMIN — POLYETHYLENE GLYCOL 3350 17 G: 17 POWDER, FOR SOLUTION ORAL at 15:31

## 2023-02-08 RX ADMIN — DEXAMETHASONE 6 MG: 2 TABLET ORAL at 09:16

## 2023-02-08 RX ADMIN — ACETAMINOPHEN 650 MG: 325 TABLET, FILM COATED ORAL at 21:54

## 2023-02-08 RX ADMIN — HYDROCODONE BITARTRATE AND ACETAMINOPHEN 1 TABLET: 5; 325 TABLET ORAL at 14:23

## 2023-02-08 RX ADMIN — CLONIDINE HYDROCHLORIDE 0.1 MG: 0.1 TABLET ORAL at 09:14

## 2023-02-08 RX ADMIN — GABAPENTIN 300 MG: 300 CAPSULE ORAL at 21:55

## 2023-02-08 RX ADMIN — ENOXAPARIN SODIUM 40 MG: 40 INJECTION SUBCUTANEOUS at 14:34

## 2023-02-08 RX ADMIN — CLONIDINE HYDROCHLORIDE 0.1 MG: 0.1 TABLET ORAL at 15:30

## 2023-02-08 RX ADMIN — PANTOPRAZOLE SODIUM 40 MG: 40 TABLET, DELAYED RELEASE ORAL at 09:14

## 2023-02-08 RX ADMIN — ISOSORBIDE DINITRATE 10 MG: 10 TABLET ORAL at 09:14

## 2023-02-08 RX ADMIN — ACETAMINOPHEN 650 MG: 325 TABLET, FILM COATED ORAL at 15:30

## 2023-02-08 RX ADMIN — HYDROXYZINE HYDROCHLORIDE 5 MG: 10 TABLET ORAL at 09:13

## 2023-02-08 RX ADMIN — HYDROXYZINE HYDROCHLORIDE 5 MG: 10 TABLET ORAL at 21:55

## 2023-02-08 RX ADMIN — Medication 250 MG: at 09:14

## 2023-02-08 RX ADMIN — GABAPENTIN 300 MG: 300 CAPSULE ORAL at 15:30

## 2023-02-08 RX ADMIN — GABAPENTIN 300 MG: 300 CAPSULE ORAL at 09:16

## 2023-02-08 RX ADMIN — AMLODIPINE BESYLATE 10 MG: 10 TABLET ORAL at 09:16

## 2023-02-08 RX ADMIN — HYDROCODONE BITARTRATE AND ACETAMINOPHEN 1 TABLET: 5; 325 TABLET ORAL at 21:55

## 2023-02-08 RX ADMIN — ISOSORBIDE DINITRATE 10 MG: 10 TABLET ORAL at 21:55

## 2023-02-08 RX ADMIN — ACETAMINOPHEN 650 MG: 325 TABLET, FILM COATED ORAL at 09:14

## 2023-02-08 RX ADMIN — INSULIN ASPART 1 UNITS: 100 INJECTION, SOLUTION INTRAVENOUS; SUBCUTANEOUS at 16:59

## 2023-02-08 RX ADMIN — SENNOSIDES AND DOCUSATE SODIUM 2 TABLET: 50; 8.6 TABLET ORAL at 15:30

## 2023-02-08 RX ADMIN — LISINOPRIL 30 MG: 20 TABLET ORAL at 09:14

## 2023-02-08 RX ADMIN — ISOSORBIDE DINITRATE 10 MG: 10 TABLET ORAL at 15:30

## 2023-02-08 ASSESSMENT — ACTIVITIES OF DAILY LIVING (ADL)
ADLS_ACUITY_SCORE: 53
ADLS_ACUITY_SCORE: 49
ADLS_ACUITY_SCORE: 53
ADLS_ACUITY_SCORE: 47
ADLS_ACUITY_SCORE: 53
ADLS_ACUITY_SCORE: 47
ADLS_ACUITY_SCORE: 49
ADLS_ACUITY_SCORE: 49

## 2023-02-08 NOTE — PLAN OF CARE
Alert to self, VSS on RA. Ax1 with belt and walker. Denies SOB, N/V and no signs of pain noted. Bed alarm on for safety. CT of head done this am, no interval changes  Major Shift Events: CT of head done this morning.     Treatment Plan: Decadron, Lovenox, Encourage activity, Discharge TBD.

## 2023-02-08 NOTE — PROGRESS NOTES
[facility]    Hospitalist Progress Note    Date of Service (when I saw the patient): 02/08/2023  Provider:  Jhoan Baltazar MD   Text Page  7am - 6PM       Assessment & Plan   Daya Ignacio is a 89 year old female with known prior history of hypertension, prior C. difficile colitis, chronic low back pain on chronic narcotics, multiple compression fractures in the past, ESBL UTI, anxiety, depression, cognitive impairment and reportedly lives in an assisted living facility set up and unfortunately presented to the emergency room by emergency personnel after she was found having jerking-like sensation and concerns for aspiration while partaking her dinner food tray.  During EMS evaluation she was found to be hypoxic at 61% via room air but were able to increase to close to 90% utilizing 15 L of oxygen en route.     Afternoon of 1/24/2023 she had worsening mental status, which culminated in vomiting.  She was found to be COVID-positive     Syncopal episode:  Patient was on the toilet/commode having a bowel movement when she suddenly became diaphoretic and slumped overm she was noted to be profoundly hypotensive.  Lactic acid was profoundly elevated greater than 4.5 was given 2 L of normal saline bolus but declining levels of lactic acid  Do suspect a component of dehydration and poor oral intake with labile hypertension being the cause of these episodes     Leukocytosis:  Unclear etiology could be stress related versus real infection  Also has been on steroids that could be lending this elevation.  UA is normal, no evidence of UTI  Follow-up CBC and BMP in the morning     Acute respiratory failure with hypoxia (H)  Acute COVID-19 infection with possible underlying COVID-19 pneumonia with hypoxia   -Subsequent procalcitonin low risk   -COVID-19 screen and influenza screen pursued due to fever spike last 1/26/2023   -Started on remdesivir and Decadron last dose 2/6/2023  -Finished  "remdesivir  -Continue to wean and titrate oxygen on Room air.  Currently on room air  -Finish oral antibiotics course for isolated sensitive E. coli with UTI.     -Done with antibiotics   -COVID-19 isolation precautions    -Blood cultures remain no growth up-to-date     Infectious encephalopathy-improving  -Earlier showing alteration in mental state.  Received 2 doses of 25 mg Seroquel  - Seroquel discontinued as patient became very sedated and lethargic.  Continue to hold any further anxiolytics  - No obvious seizure-like activity  - Reassuring CT of the head     HTN  -Now with increasing blood pressure levels, will resume all of her oral home regimen antihypertensive  - on Clonidine .2 mg at bedtime (increased February 5), coreg 12.5 in AM, 25 mg in PM, isosorbide dinatrate 10 mg TID, Lisinopril 30 mg  However given today's episode will have to hold off on several of these medications because she is demonstrating bradycardic episodes.  Will change clonidine to 0.1 mg 3 times daily with parameters in place     Chronic low back pain on chronic narcotics.  History of vertebral compression fractures.  -I reviewed her electronic medical records, home regimen and she is able to tolerate narcotics and opioids in the past.  - continued on her home regimen of hydrocodone given worsening mental status changes.    Right lower quadrant abdominal pain.  -New symptoms, patient is since nonspecific but is pointed to the right lower quadrant, she is calling it \"hospital pain\", she has not been able to give any specific description of the pain.  She is quite interactive today and in good appearance.  She does not have any nonverbal expression of pain.  On exam her abdomen is soft, depressible, no guarding or rebound, no masses felt.  Stat portable x-ray of the abdomen is negative for obstruction or any evidence of acute event going on.  She has been complaining of not having her pain medication, but this is not accurate because " she is on her home pain medication.  At this point I have no clear explanation for the pain she is complaining about.     Physical deconditioning  -Appreciate input from PT, OT likely will be needing TCU placement     Stable acute on chronic anemia likely of chronic illness  -Most likely worsening anemia from frequent blood draws  -No bleeding tendencies here  -Does not need packed RBC transfusion yet     DVT Prophylaxis: Enoxaparin (Lovenox) SQ  Code Status: No CPR- Do NOT Intubate    Disposition: Expected discharge, medically ready to return to [facility], discussed with care coordinator     .   Interval History   Patient is active and interactive, complaining about of poor management of her pain.  She believes she is not getting her pain medication.  She denies pain in her lower back but is pointing to pain in the right lower quadrant of the abdomen.  Physical exam and abdominal x-ray negative.    -Data reviewed today: I reviewed all new labs and imaging results over the last 24 hours.      Physical Exam   Temp: 97.2  F (36.2  C) Temp src: Oral BP: 132/55 Pulse: 55   Resp: 16 SpO2: 98 % O2 Device: None (Room air)    Vitals:    02/05/23 0643 02/06/23 0533 02/07/23 0439   Weight: 54.4 kg (120 lb) 53.6 kg (118 lb 3.2 oz) 56.9 kg (125 lb 6.4 oz)     Vital Signs with Ranges  Temp:  [97.2  F (36.2  C)-97.9  F (36.6  C)] 97.2  F (36.2  C)  Pulse:  [52-63] 55  Resp:  [16-20] 16  BP: (128-178)/(46-73) 132/55  SpO2:  [96 %-98 %] 98 %  I/O last 3 completed shifts:  In: 400 [P.O.:400]  Out: 1350 [Urine:1350]    GEN: Frail aspect.  Somnolent but awakable, appears comfortable, NAD.  HEENT:  Normocephalic/atraumatic, no scleral icterus, no nasal discharge, mouth moist.  CV:  Regular rate and rhythm, no murmur or JVD.  S1 + S2 noted, no S3 or S4.  LUNGS:  Clear to auscultation bilaterally without rales/rhonchi/wheezing/retractions.  Symmetric chest rise on inhalation noted.  ABD:  Active bowel sounds, soft, non-tender/non-distended.   No rebound/guarding/rigidity.  EXT:  No edema or cyanosis.  No joint synovitis noted.  SKIN:  Dry to touch, no exanthems noted in the visualized areas.       Medications       acetaminophen  650 mg Oral TID     amLODIPine  10 mg Oral Daily     [Held by provider] carvedilol  12.5 mg Oral QAM     [Held by provider] carvedilol  25 mg Oral At Bedtime     cloNIDine  0.1 mg Oral TID     enoxaparin ANTICOAGULANT  40 mg Subcutaneous Q24H     gabapentin  300 mg Oral TID     hydrOXYzine  5 mg Oral BID     insulin aspart  1-3 Units Subcutaneous TID AC     isosorbide dinitrate  10 mg Oral TID     lisinopril  30 mg Oral Daily     miconazole   Topical BID     pantoprazole  40 mg Oral QAM AC     saccharomyces boulardii  250 mg Oral Daily     senna-docusate  1 tablet Oral At Bedtime     sertraline  100 mg Oral Daily     sodium chloride (PF)  3 mL Intracatheter Q8H       Data   Recent Labs   Lab 02/08/23  1257 02/08/23  0925 02/08/23  0911 02/06/23  1234 02/06/23  1134 02/06/23  0929 02/06/23  0721   WBC  --  8.7  --   --  22.2*  --   --    HGB  --  9.8*  --   --  11.2*  --   --    MCV  --  101*  --   --  100  --   --    PLT  --  192  --   --  379  --  240   NA  --  128*  --   --  132*  --   --    POTASSIUM  --  4.1  --   --  4.4  --  4.2   CHLORIDE  --  97*  --   --  97*  --   --    CO2  --  23  --   --  20*  --   --    BUN  --  15.3  --   --  28.9*  --   --    CR  --  0.47*  --   --  0.66  --   --    ANIONGAP  --  8  --   --  15  --   --    DAVE  --  9.2  --   --  9.6  --   --    * 79 78   < > 136*   < >  --     < > = values in this interval not displayed.       No results found for this or any previous visit (from the past 24 hour(s)).      Securely message with the Voc[employer] Web Console (learn more here)  Text page via [facility] Paging/Directory        Disclaimer: This note consists of symbols derived from keyboarding, dictation and/or voice recognition software. As a result, there may be errors in the script that have gone  undetected. Please consider this when interpreting information found in this chart.

## 2023-02-08 NOTE — PLAN OF CARE
Orientedx1. Assist of 1 with belt and walker. Lung sounds clear; diminished. She slept throughout the night. Pulled out PIV on the R.arm. CT of head unremarkable.  Treatment Plan: Decadron, Lovenox, Encourage activity, Discharge TBD.     0608: Pt refused labs to be drawn. Lab will come back at around 0830.

## 2023-02-09 LAB
ANION GAP SERPL CALCULATED.3IONS-SCNC: 6 MMOL/L (ref 7–15)
BASOPHILS # BLD AUTO: 0 10E3/UL (ref 0–0.2)
BASOPHILS NFR BLD AUTO: 0 %
BUN SERPL-MCNC: 17.5 MG/DL (ref 8–23)
CALCIUM SERPL-MCNC: 8.8 MG/DL (ref 8.2–9.6)
CHLORIDE SERPL-SCNC: 99 MMOL/L (ref 98–107)
CREAT SERPL-MCNC: 0.44 MG/DL (ref 0.51–0.95)
DEPRECATED HCO3 PLAS-SCNC: 24 MMOL/L (ref 22–29)
EOSINOPHIL # BLD AUTO: 0.1 10E3/UL (ref 0–0.7)
EOSINOPHIL NFR BLD AUTO: 1 %
ERYTHROCYTE [DISTWIDTH] IN BLOOD BY AUTOMATED COUNT: 15.3 % (ref 10–15)
GFR SERPL CREATININE-BSD FRML MDRD: 90 ML/MIN/1.73M2
GLUCOSE BLDC GLUCOMTR-MCNC: 103 MG/DL (ref 70–99)
GLUCOSE BLDC GLUCOMTR-MCNC: 105 MG/DL (ref 70–99)
GLUCOSE BLDC GLUCOMTR-MCNC: 114 MG/DL (ref 70–99)
GLUCOSE BLDC GLUCOMTR-MCNC: 117 MG/DL (ref 70–99)
GLUCOSE SERPL-MCNC: 84 MG/DL (ref 70–99)
HCT VFR BLD AUTO: 28.5 % (ref 35–47)
HGB BLD-MCNC: 9.2 G/DL (ref 11.7–15.7)
IMM GRANULOCYTES # BLD: 0.3 10E3/UL
IMM GRANULOCYTES NFR BLD: 4 %
LYMPHOCYTES # BLD AUTO: 1.8 10E3/UL (ref 0.8–5.3)
LYMPHOCYTES NFR BLD AUTO: 23 %
MCH RBC QN AUTO: 33 PG (ref 26.5–33)
MCHC RBC AUTO-ENTMCNC: 32.3 G/DL (ref 31.5–36.5)
MCV RBC AUTO: 102 FL (ref 78–100)
MONOCYTES # BLD AUTO: 0.8 10E3/UL (ref 0–1.3)
MONOCYTES NFR BLD AUTO: 10 %
NEUTROPHILS # BLD AUTO: 4.6 10E3/UL (ref 1.6–8.3)
NEUTROPHILS NFR BLD AUTO: 62 %
NRBC # BLD AUTO: 0 10E3/UL
NRBC BLD AUTO-RTO: 0 /100
PLATELET # BLD AUTO: 180 10E3/UL (ref 150–450)
POTASSIUM SERPL-SCNC: 4.1 MMOL/L (ref 3.4–5.3)
RBC # BLD AUTO: 2.79 10E6/UL (ref 3.8–5.2)
SODIUM SERPL-SCNC: 129 MMOL/L (ref 136–145)
WBC # BLD AUTO: 7.5 10E3/UL (ref 4–11)

## 2023-02-09 PROCEDURE — 258N000003 HC RX IP 258 OP 636: Performed by: HOSPITALIST

## 2023-02-09 PROCEDURE — 250N000013 HC RX MED GY IP 250 OP 250 PS 637: Performed by: INTERNAL MEDICINE

## 2023-02-09 PROCEDURE — 250N000011 HC RX IP 250 OP 636: Performed by: INTERNAL MEDICINE

## 2023-02-09 PROCEDURE — 250N000013 HC RX MED GY IP 250 OP 250 PS 637: Performed by: HOSPITALIST

## 2023-02-09 PROCEDURE — 85025 COMPLETE CBC W/AUTO DIFF WBC: CPT | Performed by: HOSPITALIST

## 2023-02-09 PROCEDURE — 120N000001 HC R&B MED SURG/OB

## 2023-02-09 PROCEDURE — 36415 COLL VENOUS BLD VENIPUNCTURE: CPT | Performed by: HOSPITALIST

## 2023-02-09 PROCEDURE — 99233 SBSQ HOSP IP/OBS HIGH 50: CPT | Mod: CS | Performed by: HOSPITALIST

## 2023-02-09 PROCEDURE — 250N000013 HC RX MED GY IP 250 OP 250 PS 637: Performed by: FAMILY MEDICINE

## 2023-02-09 PROCEDURE — 80048 BASIC METABOLIC PNL TOTAL CA: CPT | Performed by: HOSPITALIST

## 2023-02-09 RX ORDER — SORBITOL SOLUTION 70 %
30 SOLUTION, ORAL MISCELLANEOUS DAILY
Status: DISCONTINUED | OUTPATIENT
Start: 2023-02-09 | End: 2023-02-10 | Stop reason: HOSPADM

## 2023-02-09 RX ADMIN — GABAPENTIN 300 MG: 300 CAPSULE ORAL at 15:52

## 2023-02-09 RX ADMIN — AMLODIPINE BESYLATE 10 MG: 10 TABLET ORAL at 09:58

## 2023-02-09 RX ADMIN — CLONIDINE HYDROCHLORIDE 0.1 MG: 0.1 TABLET ORAL at 09:59

## 2023-02-09 RX ADMIN — LISINOPRIL 30 MG: 20 TABLET ORAL at 09:58

## 2023-02-09 RX ADMIN — ACETAMINOPHEN 650 MG: 325 TABLET, FILM COATED ORAL at 09:58

## 2023-02-09 RX ADMIN — CLONIDINE HYDROCHLORIDE 0.1 MG: 0.1 TABLET ORAL at 21:25

## 2023-02-09 RX ADMIN — PANTOPRAZOLE SODIUM 40 MG: 40 TABLET, DELAYED RELEASE ORAL at 09:58

## 2023-02-09 RX ADMIN — MICONAZOLE NITRATE: 20 POWDER TOPICAL at 21:27

## 2023-02-09 RX ADMIN — ISOSORBIDE DINITRATE 10 MG: 10 TABLET ORAL at 21:28

## 2023-02-09 RX ADMIN — ISOSORBIDE DINITRATE 10 MG: 10 TABLET ORAL at 09:59

## 2023-02-09 RX ADMIN — ACETAMINOPHEN 650 MG: 325 TABLET, FILM COATED ORAL at 15:51

## 2023-02-09 RX ADMIN — ACETAMINOPHEN 650 MG: 325 TABLET, FILM COATED ORAL at 21:25

## 2023-02-09 RX ADMIN — SENNOSIDES AND DOCUSATE SODIUM 2 TABLET: 50; 8.6 TABLET ORAL at 13:25

## 2023-02-09 RX ADMIN — SORBITOL SOLUTION (BULK) 30 ML: 70 SOLUTION at 15:52

## 2023-02-09 RX ADMIN — HYDROXYZINE HYDROCHLORIDE 5 MG: 10 TABLET ORAL at 09:59

## 2023-02-09 RX ADMIN — POLYETHYLENE GLYCOL 3350 17 G: 17 POWDER, FOR SOLUTION ORAL at 13:25

## 2023-02-09 RX ADMIN — HYDROCODONE BITARTRATE AND ACETAMINOPHEN 1 TABLET: 5; 325 TABLET ORAL at 15:52

## 2023-02-09 RX ADMIN — SODIUM CHLORIDE 500 ML: 9 INJECTION, SOLUTION INTRAVENOUS at 16:16

## 2023-02-09 RX ADMIN — HYDROCODONE BITARTRATE AND ACETAMINOPHEN 1 TABLET: 5; 325 TABLET ORAL at 02:06

## 2023-02-09 RX ADMIN — ISOSORBIDE DINITRATE 10 MG: 10 TABLET ORAL at 15:52

## 2023-02-09 RX ADMIN — GABAPENTIN 300 MG: 300 CAPSULE ORAL at 09:59

## 2023-02-09 RX ADMIN — Medication 250 MG: at 09:59

## 2023-02-09 RX ADMIN — ENOXAPARIN SODIUM 40 MG: 40 INJECTION SUBCUTANEOUS at 13:25

## 2023-02-09 RX ADMIN — HYDROCODONE BITARTRATE AND ACETAMINOPHEN 1 TABLET: 5; 325 TABLET ORAL at 09:58

## 2023-02-09 RX ADMIN — CLONIDINE HYDROCHLORIDE 0.1 MG: 0.1 TABLET ORAL at 15:52

## 2023-02-09 RX ADMIN — SERTRALINE HYDROCHLORIDE 100 MG: 100 TABLET ORAL at 09:58

## 2023-02-09 RX ADMIN — HYDROXYZINE HYDROCHLORIDE 5 MG: 10 TABLET ORAL at 21:25

## 2023-02-09 RX ADMIN — SENNOSIDES AND DOCUSATE SODIUM 1 TABLET: 50; 8.6 TABLET ORAL at 21:25

## 2023-02-09 RX ADMIN — GABAPENTIN 300 MG: 300 CAPSULE ORAL at 21:25

## 2023-02-09 ASSESSMENT — ACTIVITIES OF DAILY LIVING (ADL)
ADLS_ACUITY_SCORE: 49
ADLS_ACUITY_SCORE: 53
ADLS_ACUITY_SCORE: 53
ADLS_ACUITY_SCORE: 47
ADLS_ACUITY_SCORE: 47
ADLS_ACUITY_SCORE: 53
ADLS_ACUITY_SCORE: 47

## 2023-02-09 NOTE — PLAN OF CARE
For vital signs and complete assessments, please see documentation flowsheets.     Pertinent assessments: Pt Alert to self only. Ax1 w/GB and walker. C/o abdominal pain, Norco given x 1. Refusing to get out of bed for PT and during shift. Purewick in place with good output. No BM this shift. Meds crushed w/applesauce. BG 78, 138, 159    Major Shift Events: uneventful    Treatment Plan: Decadron, Lovenox, Encourage activity, Discharge TBD.     Bedside Nurse: Isabel Langford RN

## 2023-02-09 NOTE — PROGRESS NOTES
Hospitalist Progress Note    Date of Service (when I saw the patient): 02/09/2023  Provider:  Jhoan Baltazar MD   Text Page  7am - 6PM       Assessment & Plan   Daya Ignacio is a 89 year old female with known prior history of hypertension, prior C. difficile colitis, chronic low back pain on chronic narcotics, multiple compression fractures in the past, ESBL UTI, anxiety, depression, cognitive impairment and reportedly lives in an assisted living facility set up and unfortunately presented to the emergency room by emergency personnel after she was found having jerking-like sensation and concerns for aspiration while partaking her dinner food tray.  During EMS evaluation she was found to be hypoxic at 61% via room air but were able to increase to close to 90% utilizing 15 L of oxygen en route.     Afternoon of 1/24/2023 she had worsening mental status, which culminated in vomiting.  She was found to be COVID-positive     Syncopal episode:  Patient was on the toilet/commode having a bowel movement when she suddenly became diaphoretic and slumped overm she was noted to be profoundly hypotensive.  Lactic acid was profoundly elevated greater than 4.5 was given 2 L of normal saline bolus but declining levels of lactic acid  Do suspect a component of dehydration and poor oral intake with labile hypertension being the cause of these episodes     Leukocytosis:  Unclear etiology could be stress related versus real infection  Also has been on steroids that could be lending this elevation.  UA is normal, no evidence of UTI  Follow-up CBC and BMP in the morning     Acute respiratory failure with hypoxia (H)  Acute COVID-19 infection with possible underlying COVID-19 pneumonia with hypoxia   -Subsequent procalcitonin low risk   -COVID-19 screen and influenza screen pursued due to fever spike last 1/26/2023   -Started on remdesivir and Decadron last dose 2/6/2023  -Finished  "remdesivir  -Continue to wean and titrate oxygen on Room air.  Currently on room air  -Finish oral antibiotics course for isolated sensitive E. coli with UTI.     -Done with antibiotics   -COVID-19 isolation precautions    -Blood cultures remain no growth up-to-date     Infectious encephalopathy-improving  -Earlier showing alteration in mental state.  Received 2 doses of 25 mg Seroquel  - Seroquel discontinued as patient became very sedated and lethargic.  Continue to hold any further anxiolytics  - No obvious seizure-like activity  - Reassuring CT of the head     HTN  -Now with increasing blood pressure levels, will resume all of her oral home regimen antihypertensive  - on Clonidine .2 mg at bedtime (increased February 5), coreg 12.5 in AM, 25 mg in PM, isosorbide dinatrate 10 mg TID, Lisinopril 30 mg  However given today's episode will have to hold off on several of these medications because she is demonstrating bradycardic episodes.  Will change clonidine to 0.1 mg 3 times daily with parameters in place     Chronic low back pain on chronic narcotics.  History of vertebral compression fractures.  -I reviewed her electronic medical records, home regimen and she is able to tolerate narcotics and opioids in the past.  - continued on her home regimen of hydrocodone given worsening mental status changes.    Right lower quadrant abdominal pain.  Constipation.  -New symptoms, patient is nonspecific but has pointed to the right lower quadrant, she is calling it \"hospital pain\", she has not been able to give any specific description of the pain.  She is quite interactive today and in good appearance.  She does not have any nonverbal expression of pain.  On exam her abdomen is soft, depressible, no guarding or rebound, no masses felt.  Stat portable x-ray of the abdomen is negative for obstruction or any evidence of acute event going on. At this point I have no clear explanation for the pain she is complaining about other " than the burden of stool in the colon.  Patient declines enema, will continue with stool softeners.     Physical deconditioning  -Appreciate input from PT, OT likely will be needing TCU placement     Stable acute on chronic anemia likely of chronic illness  -Most likely worsening anemia from frequent blood draws  -No bleeding tendencies here  -Does not need packed RBC transfusion yet     DVT Prophylaxis: Enoxaparin (Lovenox) SQ  Code Status: No CPR- Do NOT Intubate    Disposition: Expected discharge hopefully tomorrow, medically ready to return to [facility], discussed with care coordinator     .   Interval History   Patient is complaining about Lovenox shot in her abdomen, she is upset about it.  In any case she seems to be better today, her level of pain is significantly lower.  Physical exam and abdominal x-ray negative.    -Data reviewed today: I reviewed all new labs and imaging results over the last 24 hours.      Physical Exam   Temp: 98  F (36.7  C) Temp src: Oral BP: (Abnormal) 177/77 Pulse: 54   Resp: 18 SpO2: 98 % O2 Device: None (Room air)    Vitals:    02/05/23 0643 02/06/23 0533 02/07/23 0439   Weight: 54.4 kg (120 lb) 53.6 kg (118 lb 3.2 oz) 56.9 kg (125 lb 6.4 oz)     Vital Signs with Ranges  Temp:  [97.6  F (36.4  C)-98  F (36.7  C)] 98  F (36.7  C)  Pulse:  [54-55] 54  Resp:  [16-18] 18  BP: (128-177)/(56-77) 177/77  SpO2:  [96 %-98 %] 98 %  I/O last 3 completed shifts:  In: 480 [P.O.:480]  Out: 850 [Urine:850]    GEN: Frail aspect.  Somnolent but awakable, appears comfortable, NAD.  HEENT:  Normocephalic/atraumatic, no scleral icterus, no nasal discharge, mouth moist.  CV:  Regular rate and rhythm, no murmur or JVD.  S1 + S2 noted, no S3 or S4.  LUNGS:  Clear to auscultation bilaterally without rales/rhonchi/wheezing/retractions.  Symmetric chest rise on inhalation noted.  ABD:  Active bowel sounds, soft, non-tender/non-distended.  No rebound/guarding/rigidity.  EXT:  No edema or cyanosis.  No joint  synovitis noted.  SKIN:  Dry to touch, no exanthems noted in the visualized areas.       Medications       acetaminophen  650 mg Oral TID     amLODIPine  10 mg Oral Daily     [Held by provider] carvedilol  12.5 mg Oral QAM     [Held by provider] carvedilol  25 mg Oral At Bedtime     cloNIDine  0.1 mg Oral TID     enoxaparin ANTICOAGULANT  40 mg Subcutaneous Q24H     gabapentin  300 mg Oral TID     hydrOXYzine  5 mg Oral BID     insulin aspart  1-3 Units Subcutaneous TID AC     isosorbide dinitrate  10 mg Oral TID     lisinopril  30 mg Oral Daily     miconazole   Topical BID     pantoprazole  40 mg Oral QAM AC     saccharomyces boulardii  250 mg Oral Daily     senna-docusate  1 tablet Oral At Bedtime     sertraline  100 mg Oral Daily     sodium chloride (PF)  3 mL Intracatheter Q8H     sorbitol  30 mL Oral Daily       Data   Recent Labs   Lab 02/09/23  1320 02/09/23  0703 02/09/23  0215 02/08/23  1257 02/08/23  0925 02/06/23  1234 02/06/23  1134   WBC  --  7.5  --   --  8.7  --  22.2*   HGB  --  9.2*  --   --  9.8*  --  11.2*   MCV  --  102*  --   --  101*  --  100   PLT  --  180  --   --  192  --  379   NA  --  129*  --   --  128*  --  132*   POTASSIUM  --  4.1  --   --  4.1  --  4.4   CHLORIDE  --  99  --   --  97*  --  97*   CO2  --  24  --   --  23  --  20*   BUN  --  17.5  --   --  15.3  --  28.9*   CR  --  0.44*  --   --  0.47*  --  0.66   ANIONGAP  --  6*  --   --  8  --  15   DAVE  --  8.8  --   --  9.2  --  9.6   * 84 105*   < > 79   < > 136*    < > = values in this interval not displayed.       Recent Results (from the past 24 hour(s))   XR Abdomen Port 1 View    Narrative    EXAM: XR ABDOMEN PORT 1 VIEW  LOCATION: [facility]  DATE/TIME: 2/8/2023 4:33 PM    INDICATION: RLQ abdomen pain with negative physical exam  COMPARISON: CT 05/08/2021.      Impression    IMPRESSION: No radiographic evidence of bowel obstruction. Large volume of formed stool throughout the colon, suggestive  of constipation. No definite acute bony abnormality. Orthopedic hardware in both hips again noted. Stable lumbar spine compression   deformities.         Securely message with the [employer] Web Console (learn more here)  Text page via [employer] Paging/Directory        Disclaimer: This note consists of symbols derived from keyboarding, dictation and/or voice recognition software. As a result, there may be errors in the script that have gone undetected. Please consider this when interpreting information found in this chart.

## 2023-02-09 NOTE — PLAN OF CARE
Pt orientedx1. Assx1 w/GB and walker. C/o abdominal pain, Norco given x 1 at 0206. She slept soon afterwards. B.G stable. Purewick in place with good output. Diet Regular. Crush meds and give with apple sauce. Continue to monitor. Ready to discharge.

## 2023-02-10 VITALS
OXYGEN SATURATION: 95 % | HEIGHT: 61 IN | HEART RATE: 53 BPM | TEMPERATURE: 97.4 F | WEIGHT: 125.4 LBS | BODY MASS INDEX: 23.68 KG/M2 | DIASTOLIC BLOOD PRESSURE: 52 MMHG | SYSTOLIC BLOOD PRESSURE: 129 MMHG | RESPIRATION RATE: 16 BRPM

## 2023-02-10 LAB
ANION GAP SERPL CALCULATED.3IONS-SCNC: 11 MMOL/L (ref 7–15)
BUN SERPL-MCNC: 23 MG/DL (ref 8–23)
CALCIUM SERPL-MCNC: 8.4 MG/DL (ref 8.2–9.6)
CHLORIDE SERPL-SCNC: 101 MMOL/L (ref 98–107)
CREAT SERPL-MCNC: 0.53 MG/DL (ref 0.51–0.95)
DEPRECATED HCO3 PLAS-SCNC: 20 MMOL/L (ref 22–29)
GFR SERPL CREATININE-BSD FRML MDRD: 86 ML/MIN/1.73M2
GLUCOSE BLDC GLUCOMTR-MCNC: 82 MG/DL (ref 70–99)
GLUCOSE BLDC GLUCOMTR-MCNC: 90 MG/DL (ref 70–99)
GLUCOSE SERPL-MCNC: 77 MG/DL (ref 70–99)
POTASSIUM SERPL-SCNC: 4.4 MMOL/L (ref 3.4–5.3)
SODIUM SERPL-SCNC: 132 MMOL/L (ref 136–145)

## 2023-02-10 PROCEDURE — 250N000013 HC RX MED GY IP 250 OP 250 PS 637: Performed by: HOSPITALIST

## 2023-02-10 PROCEDURE — 36415 COLL VENOUS BLD VENIPUNCTURE: CPT | Performed by: HOSPITALIST

## 2023-02-10 PROCEDURE — 250N000011 HC RX IP 250 OP 636: Performed by: INTERNAL MEDICINE

## 2023-02-10 PROCEDURE — 80048 BASIC METABOLIC PNL TOTAL CA: CPT | Performed by: HOSPITALIST

## 2023-02-10 PROCEDURE — 250N000013 HC RX MED GY IP 250 OP 250 PS 637: Performed by: INTERNAL MEDICINE

## 2023-02-10 PROCEDURE — 99239 HOSP IP/OBS DSCHRG MGMT >30: CPT | Mod: CS | Performed by: HOSPITALIST

## 2023-02-10 PROCEDURE — 250N000013 HC RX MED GY IP 250 OP 250 PS 637: Performed by: FAMILY MEDICINE

## 2023-02-10 RX ORDER — SENNA AND DOCUSATE SODIUM 50; 8.6 MG/1; MG/1
1 TABLET, FILM COATED ORAL 2 TIMES DAILY
Status: ON HOLD | DISCHARGE
Start: 2023-02-10 | End: 2024-05-05

## 2023-02-10 RX ADMIN — PANTOPRAZOLE SODIUM 40 MG: 40 TABLET, DELAYED RELEASE ORAL at 09:13

## 2023-02-10 RX ADMIN — HYDROXYZINE HYDROCHLORIDE 5 MG: 10 TABLET ORAL at 09:13

## 2023-02-10 RX ADMIN — ONDANSETRON 4 MG: 4 TABLET, ORALLY DISINTEGRATING ORAL at 10:32

## 2023-02-10 RX ADMIN — AMLODIPINE BESYLATE 10 MG: 10 TABLET ORAL at 09:13

## 2023-02-10 RX ADMIN — GABAPENTIN 300 MG: 300 CAPSULE ORAL at 09:14

## 2023-02-10 RX ADMIN — Medication 250 MG: at 09:13

## 2023-02-10 RX ADMIN — ISOSORBIDE DINITRATE 10 MG: 10 TABLET ORAL at 09:20

## 2023-02-10 RX ADMIN — SORBITOL SOLUTION (BULK) 30 ML: 70 SOLUTION at 10:11

## 2023-02-10 RX ADMIN — HYDROCODONE BITARTRATE AND ACETAMINOPHEN 1 TABLET: 5; 325 TABLET ORAL at 10:11

## 2023-02-10 RX ADMIN — ACETAMINOPHEN 650 MG: 325 TABLET, FILM COATED ORAL at 09:14

## 2023-02-10 RX ADMIN — SERTRALINE HYDROCHLORIDE 100 MG: 100 TABLET ORAL at 09:13

## 2023-02-10 RX ADMIN — LISINOPRIL 30 MG: 20 TABLET ORAL at 09:13

## 2023-02-10 RX ADMIN — CLONIDINE HYDROCHLORIDE 0.1 MG: 0.1 TABLET ORAL at 09:13

## 2023-02-10 RX ADMIN — MICONAZOLE NITRATE: 20 POWDER TOPICAL at 09:20

## 2023-02-10 ASSESSMENT — ACTIVITIES OF DAILY LIVING (ADL)
ADLS_ACUITY_SCORE: 47
ADLS_ACUITY_SCORE: 51

## 2023-02-10 NOTE — PLAN OF CARE
For vital signs and complete assessments, please see documentation flowsheets.     Pertinent assessments: Pt alert to self only w/ intermittent confusion & forgetfulness. VSS on RA. Ax1 w/ GB & walker. Denies nausea, dizziness, CP & SOB. Abdominal pain managed w/ tylenol. BG checks done; 103, 90. Purewick in place overnight, good output, miconazole powder placed on kayce-area d/t redness. BS active x4, 1 large BM this shift. Meds crushed w/applesauce. Resting between cares.    Major Shift Events: uneventful    Treatment Plan: Encourage activity OOB, BG checks, discharge tomorrow to [facility]    Bedside Nurse: Debbie Bonilla RN

## 2023-02-10 NOTE — PLAN OF CARE
Goal Outcome Evaluation: Pt alert and oriented to self. Given norco for chronic pain in back. Incontinent of stool x 1 and purewick for urine. Took all am meds and pt is ready for discharge to facility. [facility] transport came and out in wheelchair.

## 2023-02-10 NOTE — PLAN OF CARE
Physical Therapy Discharge Summary    Reason for therapy discharge:    Discharged to home with home therapy.    Progress towards therapy goal(s). See goals on Care Plan in [facility] electronic health record for goal details.  Goals not met.  Barriers to achieving goals:   limited tolerance for therapy and discharge from facility.    Therapy recommendation(s):    Continued therapy is recommended.  Rationale/Recommendations:  Rec LTC facility due to patient's lack of participation and progress with PT. .      **Pt not seen by discharging therapist on this date, note written based on previous treating therapist's notes and recommendations

## 2023-02-10 NOTE — PLAN OF CARE
For vital signs and complete assessments, please see documentation flowsheets.     Pertinent assessments: Pt alert to self only and confused. Ax1 w/gb walker.    C/o abdominal pain, Norco given x 2.  B.G 114, 117.   Purewick in place with good output. Meds crushed w/applesauce. No BM this shift.    Major Shift Events: uneventful    Treatment Plan: Encourage activity OOB, BG checks, discharge tomorrow.    Bedside Nurse: Isabel Langford RN

## 2023-02-10 NOTE — PLAN OF CARE
Occupational Therapy Discharge Summary    Reason for therapy discharge:    Discharged to home with home therapy.    Progress towards therapy goal(s). See goals on Care Plan in [facility] electronic health record for goal details.  Goals not met.  Barriers to achieving goals:   limited tolerance for therapy and discharge from facility.    Therapy recommendation(s):    Continued therapy is recommended.  Rationale/Recommendations:  TCU was recommended, however pt discharge to [facility] w/  services.      **Pt not seen by discharging therapist on this date, note written based on previous treating therapist's notes and recommendations

## 2023-02-10 NOTE — DISCHARGE INSTRUCTIONS
Your home care referral was sent to [facility] with a delayed start of care on Monday 2/13.   If you haven't heard from them within the next 24-48 hours,  Please call them at [phone]

## 2023-02-10 NOTE — DISCHARGE SUMMARY
[facility]    Discharge Summary  Hospitalist    Date of Admission:  1/23/2023  Date of Discharge:  2/10/2023  Provider:  Jhoan Baltazar MD  Date of Service (when I last saw the patient): 02/10/23    Discharge Diagnoses   Acute respiratory failure with hypoxia (H)  Acute COVID-19 infection with possible underlying COVID-19 pneumonia with hypoxia  E. coli UTI  Infectious encephalopathy   Syncopal episode  Stable acute on chronic anemia likely of chronic illness  Right lower quadrant abdominal pain.  Constipation.  HTN  Physical deconditioning  Moderate malnutrition   In Context of:  Acute illness or injury with underlying chronic illness or disease        Other medical issues:  Past Medical History:   Diagnosis Date     Anemia      Anxiety      Arthritis      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 4/16/2014     Closed fracture of multiple pubic rami, left, initial encounter (H) 10/6/2021     Closed fracture of proximal end of right tibia and fibula 8/28/2019     Depression      h/o Clostridium difficile colitis 06/2019     Hip fracture (H) 2/28/2011     History of blood transfusion      Hypertension      Kidney stone      Lumbar compression fracture (H)      Macular degeneration (senile) of retina, unspecified      Mild cognitive impairment      Mumps      NSTEMI (non-ST elevated myocardial infarction) (H) 5/28/2017     Other chronic pain     low back     Recurrent UTI      Sepsis secondary to UTI (H) 7/29/2019     Upper GI bleed 06/2019     Urinary tract infection due to extended-spectrum beta lactamase (ESBL)-producing Klebsiella 05/2019    resistant to Bactrim       History of Present Illness   Daya Ignacio is an 89 year old female who presented with jerking, hypoxemia.  Please see the admission history and physical for full details.    Hospital Course   Daya Ignacio is a 89 year old female with known prior history of hypertension, prior C. difficile colitis, chronic low back pain  on chronic narcotics, multiple compression fractures in the past, ESBL UTI, anxiety, depression, cognitive impairment and reportedly lives in an assisted living facility set up. She presented to the emergency room by emergency personnel after she was found having jerking-like sensation and concerns for aspiration while partaking her dinner food tray.  During EMS evaluation she was found to be hypoxic at 61% via room air but were able to increase to close to 90% utilizing 15 L of oxygen en route.     Afternoon of 1/24/2023 she had worsening mental status, which culminated in vomiting.  She was found to be COVID-positive     Syncopal episode:  Patient was on the toilet/commode having a bowel movement when she suddenly became diaphoretic and slumped overm she was noted to be profoundly hypotensive.  Lactic acid was profoundly elevated greater than 4.5 was given 2 L of normal saline bolus but declining levels of lactic acid  Do suspect a component of dehydration and poor oral intake with labile hypertension being the cause of these episodes     Leukocytosis:  Unclear etiology could be stress related versus real infection  Also has been on steroids that could be lending this elevation.     Acute respiratory failure with hypoxia (H)  Acute COVID-19 infection with possible underlying COVID-19 pneumonia with hypoxia   -Subsequent procalcitonin low risk   -COVID-19 screen and influenza screen pursued due to fever spike last 1/26/2023   -Started on remdesivir and Decadron last dose 2/6/2023  -Finished remdesivir  -Continue to wean and titrate oxygen on Room air.  Currently on room air  -Finish oral antibiotics course for isolated sensitive E. coli with UTI.    -Done with antibiotics   -COVID-19 isolation precautions    -Blood cultures remain no growth up-to-date     Infectious encephalopathy-resolved  - Earlier showing alteration in mental state.  Received 2 doses of 25 mg Seroquel  - Seroquel discontinued as patient became  "very sedated and lethargic.  Continue to hold any further anxiolytics  - No obvious seizure-like activity  - Reassuring CT of the head     HTN  -Now with increasing blood pressure levels, will resume all of her oral home regimen antihypertensive  - on Clonidine .2 mg at bedtime (increased February 5), coreg 12.5 in AM, 25 mg in PM, isosorbide dinatrate 10 mg TID, Lisinopril 30 mg  However given today's episode will have to hold off on several of these medications because she is demonstrating bradycardic episodes.  Will change clonidine to 0.1 mg 3 times daily with parameters in place     Chronic low back pain on chronic narcotics.  History of vertebral compression fractures.  -I reviewed her electronic medical records, home regimen and she is able to tolerate narcotics and opioids in the past.  - continued on her home regimen of hydrocodone given worsening mental status changes.     Right lower quadrant abdominal pain.  Constipation.  -New symptoms, patient is nonspecific but has pointed to the right lower quadrant, she is calling it \"hospital pain\", she has not been able to give any specific description of the pain.  She is quite interactive today and in good appearance.  She does not have any nonverbal expression of pain.  On exam her abdomen is soft, depressible, no guarding or rebound, no masses felt.  Stat portable x-ray of the abdomen is negative for obstruction or any evidence of acute event going on. At this point I have no clear explanation for the pain she is complaining about other than the burden of stool in the colon.  Patient declines enema, will continue with stool softeners.     Physical deconditioning  -Appreciate input from PT, OT likely will be needing TCU placement     Stable acute on chronic anemia likely of chronic illness  -Most likely worsening anemia from frequent blood draws  -No bleeding tendencies here  -Does not need packed RBC transfusion yet    # Discharge Pain Plan:     - Patient " currently has NO PAIN and is not being prescribed pain medications on discharge.      Significant Results and Procedures   See below     Pending Results      Unresulted Labs Ordered in the Past 30 Days of this Admission     No orders found from 12/24/2022 to 1/24/2023.          Code Status   DNR / DNI       Primary Care Physician   DINORAH DISLA    GEN:  Alert, oriented x 3, appears comfortable, NAD.  HEENT:  Normocephalic/atraumatic, no scleral icterus, no nasal discharge, mouth moist.  CV:  Regular rate and rhythm, no murmur or JVD.  S1 + S2 noted, no S3 or S4.  LUNGS:  Clear to auscultation bilaterally without rales/rhonchi/wheezing/retractions.  Symmetric chest rise on inhalation noted.  ABD:  Active bowel sounds, soft, non-tender/non-distended.  No rebound/guarding/rigidity.  EXT:  No edema or cyanosis.  No joint synovitis noted.  SKIN:  Dry to touch, no exanthems noted in the visualized areas.     Discharge Disposition   Discharged to  [facility]    Consultations This Hospital Stay   CARE MANAGEMENT / SOCIAL WORK IP CONSULT  SPEECH LANGUAGE PATH ADULT IP CONSULT  PHYSICAL THERAPY ADULT IP CONSULT  OCCUPATIONAL THERAPY ADULT IP CONSULT  CARE MANAGEMENT / SOCIAL WORK IP CONSULT    Time Spent on this Encounter   I, Jhoan Baltazar MD, personally saw the patient today and spent greater than 30 minutes discharging this patient.     Discharge Orders   No discharge procedures on file.  Discharge Medications   Current Discharge Medication List      CONTINUE these medications which have CHANGED    Details   cloNIDine (CATAPRES) 0.1 MG tablet Take 2 tablets (0.2 mg) by mouth At Bedtime for 30 days  Qty: 60 tablet, Refills: 0    Associated Diagnoses: Hypertension, unspecified type      SENNA-docusate sodium (SENNA S) 8.6-50 MG tablet Take 1 tablet by mouth 2 times daily    Associated Diagnoses: Constipation, unspecified constipation type         CONTINUE these medications which have NOT CHANGED    Details   acetaminophen  (TYLENOL) 325 MG tablet Take 650 mg by mouth 3 times daily      amLODIPine (NORVASC) 10 MG tablet Take 10 mg by mouth daily      !! carvedilol (COREG) 25 MG tablet Take 25 mg by mouth At Bedtime      !! carvedilol (COREG) 25 MG tablet Take 12.5 mg by mouth every morning       Cholecalciferol (VITAMIN D3) 2000 UNITS CAPS Take 2,000 Units by mouth daily       diclofenac (VOLTAREN) 1 % topical gel Place 2 g onto the skin 3 times daily Apply to right knee      Ferrous Gluconate 324 (37.5 Fe) MG TABS Take 1 tablet by mouth daily      gabapentin (NEURONTIN) 300 MG capsule Take 300 mg by mouth 3 times daily      HYDROcodone-acetaminophen (NORCO) 5-325 MG tablet Take 1 tablet by mouth 3 times daily as needed for pain      hydrOXYzine (ATARAX) 10 MG tablet Take 10 mg by mouth 2 times daily      isosorbide dinitrate (ISORDIL) 10 MG tablet Take 1 tablet (10 mg) by mouth 3 times daily  Qty: 270 tablet, Refills: 3    Associated Diagnoses: Non-STEMI (non-ST elevated myocardial infarction) (H)      Lidocaine (LIDOCARE) 4 % Patch Place 2 patches onto the skin every 24 hours To prevent lidocaine toxicity, patient should be patch free for 12 hrs daily.  Qty: 14 patch, Refills: 0    Associated Diagnoses: Acute midline low back pain, unspecified whether sciatica present      lisinopril (ZESTRIL) 30 MG tablet Take 30 mg by mouth daily      Multiple Vitamins-Minerals (PRESERVISION AREDS) CAPS Take 1 capsule by mouth 2 times daily       nystatin (MYCOSTATIN) 838993 UNIT/GM external cream Apply topically 2 times daily as needed (rash)      omeprazole (PRILOSEC) 20 MG DR capsule Take 20 mg by mouth daily       ondansetron (ZOFRAN) 4 MG tablet Take 4 mg by mouth every 8 hours as needed for nausea      polyethylene glycol (MIRALAX/GLYCOLAX) packet Take 17 g by mouth daily as needed for constipation    Associated Diagnoses: Tibia/fibula fracture, right, closed, initial encounter      saccharomyces boulardii (FLORASTOR) 250 MG capsule Take 250  mg by mouth daily      sertraline (ZOLOFT) 100 MG tablet Take 100 mg by mouth daily      vitamin C (ASCORBIC ACID) 500 MG tablet Take 500 mg by mouth daily        !! - Potential duplicate medications found. Please discuss with provider.        Allergies   Allergies   Allergen Reactions     Atorvastatin Muscle Pain (Myalgia)     Macrobid [Nitrofurantoin Anhydrous] Other (See Comments)     Body aches     Nitrofurantoin Muscle Pain (Myalgia) and Unknown     Body aches       Penicillins Unknown     Seasonal Allergies Unknown     Sulfa Drugs Other (See Comments)     Tolerated Bactrim May 2019  Entire family has allergy to Sulfa     Sulfasalazine Unknown     Data   Most Recent 3 CBC's:Recent Labs   Lab Test 02/09/23  0703 02/08/23  0925 02/06/23  1134   WBC 7.5 8.7 22.2*   HGB 9.2* 9.8* 11.2*   * 101* 100    192 379      Most Recent 3 BMP's:  Recent Labs   Lab Test 02/10/23  0210 02/09/23  2139 02/09/23  1736 02/09/23  1320 02/09/23  0703 02/08/23  1257 02/08/23  0925 02/06/23  1234 02/06/23  1134   NA  --   --   --   --  129*  --  128*  --  132*   POTASSIUM  --   --   --   --  4.1  --  4.1  --  4.4   CHLORIDE  --   --   --   --  99  --  97*  --  97*   CO2  --   --   --   --  24  --  23  --  20*   BUN  --   --   --   --  17.5  --  15.3  --  28.9*   CR  --   --   --   --  0.44*  --  0.47*  --  0.66   ANIONGAP  --   --   --   --  6*  --  8  --  15   DAVE  --   --   --   --  8.8  --  9.2  --  9.6   GLC 90 103* 117*   < > 84   < > 79   < > 136*    < > = values in this interval not displayed.     Most Recent 2 LFT's:  Recent Labs   Lab Test 01/24/23  0644 06/22/22  1000   AST 14 9   ALT 8* <9   ALKPHOS 77 57   BILITOTAL 1.1 0.9     Most Recent INR's and Anticoagulation Dosing History:  Anticoagulation Dose History     Recent Dosing and Labs Latest Ref Rng & Units 6/24/2011 6/25/2011 6/26/2011 6/27/2011 6/20/2017 8/28/2019 5/6/2020    INR 0.86 - 1.14 1.53(H) 1.40(H) 1.30(H) 1.21(H) 1.18(H) 1.18(H) 1.13        Most  Recent 3 Troponin's:  Recent Labs   Lab Test 07/22/19  1014 07/13/19  1309 05/31/19  1040   TROPI <0.015 <0.015 <0.015     Most Recent Cholesterol Panel:  Recent Labs   Lab Test 02/19/19  1022   CHOL 176   *   HDL 49*   TRIG 95     Most Recent 6 Bacteria Isolates From Any Culture (See EPIC Reports for Culture Details):  Recent Labs   Lab Test 05/11/21  1613 05/11/21  1607 05/09/21  0120 11/02/20  0819 11/02/20  0817 10/31/20  1130   CULT No growth No growth >100,000 colonies/mL  Aerococcus urinae  Identification obtained by MALDI-TOF mass spectrometry research use only database. Test   characteristics determined and verified by the [facility]   ([facility]) [place], [place].  Aerococcus is usually susceptible to penicillin, cephalosporins, tetracycline and   vancomycin.  *  >100,000 colonies/mL  Escherichia coli ESBL  ESBL (extended beta lactamase) producing organisms require contact precautions.  * No growth No growth <10,000 colonies/mL  Proteus mirabilis  *  <10,000 colonies/mL  Enterococcus faecium  *  <10,000 colonies/mL  Strain 2  Enterococcus faecium  *  <10,000 colonies/mL  Candida glabrata complex  Susceptibility testing not routinely done  *  ID requested by Dr. Camejo on 11.2.20 at 1311. bw     Most Recent TSH, T4 and A1c Labs:  Recent Labs   Lab Test 12/10/19  0848 01/13/17  1020   TSH 0.79  --    A1C  --  5.3     Results for orders placed or performed during the hospital encounter of 01/23/23   XR Chest Port 1 View    Narrative    EXAM: XR CHEST PORT 1 VIEW  LOCATION: [facility]  DATE/TIME: 1/23/2023 6:22 PM    INDICATION: shortness of breath  COMPARISON: 10/06/2021      Impression    IMPRESSION: Fine linear opacities radiating from the [place]ham into the periphery of both lungs have developed consistent with mild interstitial edema. Heart size and pulmonary vascularity upper normal. Bones are demineralized.   XR Video Swallow with SLP or OT     Narrative    XR VIDEO SWALLOW WITH SLP OR OT   1/24/2023 11:52 AM     HISTORY: dysphagia    COMPARISON: None.    FINDINGS:  A swallow study was performed in coordination with a member  from the speech pathology service. Total fluoroscopy time was .5  minutes. 5 spot fluoroscopic images, and/or cine clips were obtained.  1 view in lateral projection. Various consistencies of barium were  given to the patient to swallow, and the swallowing mechanism was  observed using fluoroscopy.    Penetration with thin liquids. No visible aspiration.      Impression    IMPRESSION:  1. Penetration with thin liquids. No aspiration identified.  2. Please refer to the speech pathology report for further details.    This study includes only the cervical esophagus.    DINORAH SO MD         SYSTEM ID:  [id number]   CT Head w/o Contrast    Narrative    EXAM: CT HEAD W/O CONTRAST  LOCATION: [facility]  DATE/TIME: 1/24/2023 5:41 PM    INDICATION: Altered mental status, vomiting, possible seizure prior to admission 24 hours ago  COMPARISON:  CT head 07/13/2019.  TECHNIQUE: Routine CT Head without IV contrast. Multiplanar reformats. Dose reduction techniques were used.    FINDINGS:  INTRACRANIAL CONTENTS: No intracranial hemorrhage, extraaxial collection, or mass effect.  No CT evidence of acute infarct. Mild to moderate presumed chronic small vessel ischemic changes. Moderate generalized volume loss. No hydrocephalus.     VISUALIZED ORBITS/SINUSES/MASTOIDS: Prior bilateral cataract surgery. Visualized portions of the orbits are otherwise unremarkable. No paranasal sinus mucosal disease. No middle ear or mastoid effusion.    BONES/SOFT TISSUES: No acute abnormality.      Impression    IMPRESSION:  1.  No CT evidence for acute intracranial process.  2.  Brain atrophy and presumed chronic microvascular ischemic changes, mildly progressed since 2019.   XR Chest Port 1 View    Narrative    XR CHEST PORT 1 VIEW 1/26/2023  10:20 AM    HISTORY: Fever, hypoxia, wheezing    COMPARISON: 1/23/2023      Impression    IMPRESSION: Stable mild bibasilar retrocardiac atelectasis/pneumonia.  No pleural effusion or pneumothorax. Normal heart size.    HATTIE DEVINE MD         SYSTEM ID:  [id number]   CTA Head Neck w Contrast    Narrative    CT ANGIOGRAM OF THE HEAD AND NECK WITHOUT AND WITH CONTRAST  2/6/2023  12:12 PM     COMPARISON: None    HISTORY: Acute onset dizzy and facial weakness    TECHNIQUE:  Precontrast localizing scans were followed by CT  angiography with an injection of 70 mL Isovue-370 nonionic intravenous  contrast material with scans through the head and neck.  Images were  transferred to a separate 3-D workstation where multiplanar  reformations and 3-D images were created.  Estimates of carotid  stenoses are made relative to the distal internal carotid artery  diameters except as noted.      FINDINGS:   Neck CTA: There is minimal atherosclerotic narrowing at the origin of  the left subclavian artery. Arch origins of the great vessels are  otherwise within normal limits. The bilateral common carotid,  bilateral cervical internal carotid and bilateral vertebral arteries  are patent without stenosis. There is calcified plaque at the origins  of the internal carotid arteries bilaterally. The cervical internal  carotid arteries bilaterally are tortuous.    Head CTA: The basilar, bilateral intracranial distal internal carotid,  bilateral anterior cerebral, bilateral middle cerebral and bilateral  posterior cerebral arteries are patent and unremarkable.      Impression    IMPRESSION:  1. Plaque without stenosis at the origins of the internal carotid  arteries bilaterally.  2. Otherwise, normal head and neck CTA.      Radiation dose for this scan was reduced using automated exposure  control, adjustment of the mA and/or kV according to patient size, or  iterative reconstruction technique    ZENY JERRY MD         SYSTEM ID:  [id number]    CT Head w/o Contrast    Narrative    CT OF THE HEAD WITHOUT CONTRAST 2/6/2023 12:01 PM     COMPARISON: Head CT 1/24/2023    HISTORY: Dizzy and facial weakness    TECHNIQUE: 5 mm thick axial CT images of the head were acquired  without IV contrast material.    FINDINGS: There is moderate diffuse cerebral volume loss. There are  subtle patchy areas of decreased density in the cerebral white matter  bilaterally that are consistent with sequela of chronic small vessel  ischemic disease.    The ventricles and basal cisterns are within normal limits in  configuration given the degree of cerebral volume loss.  There is no  midline shift. There are no extra-axial fluid collections.    No intracranial hemorrhage, mass or recent infarct.    The visualized paranasal sinuses are well-aerated. There is no  mastoiditis. There are no fractures of the visualized bones.      Impression    IMPRESSION: Diffuse cerebral volume loss and cerebral white matter  changes consistent with chronic small vessel ischemic disease. No  evidence for acute intracranial pathology.      Radiation dose for this scan was reduced using automated exposure  control, adjustment of the mA and/or kV according to patient size, or  iterative reconstruction technique    ZENY JERRY MD         SYSTEM ID:  [id number]   CT Head Perfusion w Contrast    Narrative    CT BRAIN PERFUSION 2/6/2023 12:20 PM    COMPARISON: None    HISTORY: Altered mental status.    TECHNIQUE: Time sequential axial CT images of the head were acquired  during the administration of intravenous contrast (125mL Isovue-370).  CTA images of the [place] as well as color perfusion maps of  the brain were created from this time sequential axial source data.    FINDINGS: There are no focal or regional perfusion defects in the  brain.      Impression    IMPRESSION: Normal CT perfusion of the brain.      Radiation dose for this scan was reduced using automated exposure  control, adjustment  of the mA and/or kV according to patient size, or  iterative reconstruction technique    ZENY JERRY MD         SYSTEM ID:  [id number]   CT Head w/o Contrast    Narrative    CT OF THE HEAD WITHOUT CONTRAST   2/7/2023 8:19 AM     COMPARISON: Head CT 2/6/2023    HISTORY:  Altered mental status.     TECHNIQUE:  Axial CT images of the head from the skull base to the  vertex were acquired without IV contrast.    FINDINGS:   INTRACRANIAL CONTENTS: No intracranial hemorrhage, extraaxial  collection, or mass effect.  No CT evidence of acute infarct.   Mild  to moderate presumed chronic small vessel ischemic change with mild to  moderate generalized volume loss.    VISUALIZED ORBITS/SINUSES/MASTOIDS: No significant orbital  abnormality.  No significant paranasal sinus mucosal disease. No  significant middle ear or mastoid effusion.    OSSEOUS STRUCTURES/SOFT TISSUES: No significant abnormality.      Impression    IMPRESSION:  1.  Mild/moderate chronic small vessel ischemic change and age-related  change.  2.  No interval change.    Radiation dose for this scan was reduced using automated exposure  control, adjustment of the mA and/or kV according to patient size, or  iterative reconstruction technique    RICKEY COMBS MD         SYSTEM ID:  [id number]   XR Abdomen Port 1 View    Narrative    EXAM: XR ABDOMEN PORT 1 VIEW  LOCATION: [facility]  DATE/TIME: 2/8/2023 4:33 PM    INDICATION: RLQ abdomen pain with negative physical exam  COMPARISON: CT 05/08/2021.      Impression    IMPRESSION: No radiographic evidence of bowel obstruction. Large volume of formed stool throughout the colon, suggestive of constipation. No definite acute bony abnormality. Orthopedic hardware in both hips again noted. Stable lumbar spine compression   deformities.     Disclaimer: This note consists of symbols derived from keyboarding, dictation and/or voice recognition software. As a result, there may be errors in the script  that have gone undetected. Please consider this when interpreting information found in this chart.

## 2023-02-10 NOTE — PROGRESS NOTES
Care Management Discharge Note    Discharge Date: 02/10/2023       Discharge Disposition: Assisted Living, Home Care    Discharge Services:      Discharge DME:      Discharge Transportation: agency    Private pay costs discussed: transportation costs    PAS Confirmation Code:    Patient/family educated on Medicare website which has current facility and service quality ratings: yes    Education Provided on the Discharge Plan: yes    Persons Notified of Discharge Plans: patient, son, [facility]  Patient/Family in Agreement with the Plan: yes    Handoff Referral Completed: No    Additional Information:  Medically ready for discharge, did have large bm overnight. Contacted Erlinda at [facility] and orders have been faxed to [phone]. No new prescriptions.   [facility] has accepted for home PT services with a delayed start of Monday 2/13. Orders will be faxed once completed. [facility] aware of home care agency  [facility] transport is set for 1200    Clarice Morgan RN BSN OCN  Care Coordinator  [facility]  [phone]          Clarice Morgan RN

## 2023-02-15 ENCOUNTER — DOCUMENTATION ONLY (OUTPATIENT)
Dept: OTHER | Facility: CLINIC | Age: 88
End: 2023-02-15
Payer: COMMERCIAL

## 2023-02-21 ENCOUNTER — LAB REQUISITION (OUTPATIENT)
Dept: LAB | Facility: CLINIC | Age: 88
End: 2023-02-21
Payer: COMMERCIAL

## 2023-02-21 DIAGNOSIS — I10 ESSENTIAL (PRIMARY) HYPERTENSION: ICD-10-CM

## 2023-02-22 LAB
ANION GAP SERPL CALCULATED.3IONS-SCNC: 11 MMOL/L (ref 7–15)
BUN SERPL-MCNC: 13.8 MG/DL (ref 8–23)
CALCIUM SERPL-MCNC: 8.8 MG/DL (ref 8.2–9.6)
CHLORIDE SERPL-SCNC: 106 MMOL/L (ref 98–107)
CREAT SERPL-MCNC: 0.43 MG/DL (ref 0.51–0.95)
DEPRECATED HCO3 PLAS-SCNC: 22 MMOL/L (ref 22–29)
ERYTHROCYTE [DISTWIDTH] IN BLOOD BY AUTOMATED COUNT: 15.6 % (ref 10–15)
GFR SERPL CREATININE-BSD FRML MDRD: >90 ML/MIN/1.73M2
GLUCOSE SERPL-MCNC: 89 MG/DL (ref 70–99)
HCT VFR BLD AUTO: 31.8 % (ref 35–47)
HGB BLD-MCNC: 10.1 G/DL (ref 11.7–15.7)
MCH RBC QN AUTO: 32.4 PG (ref 26.5–33)
MCHC RBC AUTO-ENTMCNC: 31.8 G/DL (ref 31.5–36.5)
MCV RBC AUTO: 102 FL (ref 78–100)
PLATELET # BLD AUTO: 175 10E3/UL (ref 150–450)
POTASSIUM SERPL-SCNC: 3.5 MMOL/L (ref 3.4–5.3)
RBC # BLD AUTO: 3.12 10E6/UL (ref 3.8–5.2)
SODIUM SERPL-SCNC: 139 MMOL/L (ref 136–145)
WBC # BLD AUTO: 5.5 10E3/UL (ref 4–11)

## 2023-02-22 PROCEDURE — 36415 COLL VENOUS BLD VENIPUNCTURE: CPT | Mod: ORL | Performed by: FAMILY MEDICINE

## 2023-02-22 PROCEDURE — P9603 ONE-WAY ALLOW PRORATED MILES: HCPCS | Mod: ORL | Performed by: FAMILY MEDICINE

## 2023-02-22 PROCEDURE — 85027 COMPLETE CBC AUTOMATED: CPT | Mod: ORL | Performed by: FAMILY MEDICINE

## 2023-02-22 PROCEDURE — 80048 BASIC METABOLIC PNL TOTAL CA: CPT | Mod: ORL | Performed by: FAMILY MEDICINE

## 2023-06-02 ENCOUNTER — HEALTH MAINTENANCE LETTER (OUTPATIENT)
Age: 88
End: 2023-06-02

## 2023-08-28 ENCOUNTER — LAB REQUISITION (OUTPATIENT)
Dept: LAB | Facility: CLINIC | Age: 88
End: 2023-08-28
Payer: COMMERCIAL

## 2023-08-28 DIAGNOSIS — I10 ESSENTIAL (PRIMARY) HYPERTENSION: ICD-10-CM

## 2023-08-30 LAB
ANION GAP SERPL CALCULATED.3IONS-SCNC: 9 MMOL/L (ref 7–15)
BUN SERPL-MCNC: 13.7 MG/DL (ref 8–23)
CALCIUM SERPL-MCNC: 9.5 MG/DL (ref 8.2–9.6)
CHLORIDE SERPL-SCNC: 103 MMOL/L (ref 98–107)
CREAT SERPL-MCNC: 0.49 MG/DL (ref 0.51–0.95)
DEPRECATED HCO3 PLAS-SCNC: 25 MMOL/L (ref 22–29)
ERYTHROCYTE [DISTWIDTH] IN BLOOD BY AUTOMATED COUNT: 15.1 % (ref 10–15)
GFR SERPL CREATININE-BSD FRML MDRD: 88 ML/MIN/1.73M2
GLUCOSE SERPL-MCNC: 85 MG/DL (ref 70–99)
HCT VFR BLD AUTO: 28.3 % (ref 35–47)
HGB BLD-MCNC: 8.8 G/DL (ref 11.7–15.7)
MCH RBC QN AUTO: 32.4 PG (ref 26.5–33)
MCHC RBC AUTO-ENTMCNC: 31.1 G/DL (ref 31.5–36.5)
MCV RBC AUTO: 104 FL (ref 78–100)
PLATELET # BLD AUTO: 224 10E3/UL (ref 150–450)
POTASSIUM SERPL-SCNC: 3.8 MMOL/L (ref 3.4–5.3)
RBC # BLD AUTO: 2.72 10E6/UL (ref 3.8–5.2)
SODIUM SERPL-SCNC: 137 MMOL/L (ref 136–145)
WBC # BLD AUTO: 4.8 10E3/UL (ref 4–11)

## 2023-08-30 PROCEDURE — 80048 BASIC METABOLIC PNL TOTAL CA: CPT | Mod: ORL | Performed by: NURSE PRACTITIONER

## 2023-08-30 PROCEDURE — 36415 COLL VENOUS BLD VENIPUNCTURE: CPT | Mod: ORL | Performed by: NURSE PRACTITIONER

## 2023-08-30 PROCEDURE — 85027 COMPLETE CBC AUTOMATED: CPT | Mod: ORL | Performed by: NURSE PRACTITIONER

## 2023-08-30 PROCEDURE — P9604 ONE-WAY ALLOW PRORATED TRIP: HCPCS | Mod: ORL | Performed by: NURSE PRACTITIONER

## 2023-09-26 ENCOUNTER — LAB REQUISITION (OUTPATIENT)
Dept: LAB | Facility: CLINIC | Age: 88
End: 2023-09-26
Payer: COMMERCIAL

## 2023-09-26 DIAGNOSIS — D50.0 IRON DEFICIENCY ANEMIA SECONDARY TO BLOOD LOSS (CHRONIC): ICD-10-CM

## 2023-09-27 LAB
ERYTHROCYTE [DISTWIDTH] IN BLOOD BY AUTOMATED COUNT: 14.7 % (ref 10–15)
FOLATE SERPL-MCNC: 31.4 NG/ML (ref 4.6–34.8)
HCT VFR BLD AUTO: 29.6 % (ref 35–47)
HGB BLD-MCNC: 10 G/DL (ref 11.7–15.7)
IRON BINDING CAPACITY (ROCHE): 213 UG/DL (ref 240–430)
IRON SATN MFR SERPL: 36 % (ref 15–46)
IRON SERPL-MCNC: 77 UG/DL (ref 37–145)
MCH RBC QN AUTO: 33.9 PG (ref 26.5–33)
MCHC RBC AUTO-ENTMCNC: 33.8 G/DL (ref 31.5–36.5)
MCV RBC AUTO: 100 FL (ref 78–100)
PLATELET # BLD AUTO: 229 10E3/UL (ref 150–450)
RBC # BLD AUTO: 2.95 10E6/UL (ref 3.8–5.2)
RETICS # AUTO: 0.05 10E6/UL (ref 0.03–0.1)
RETICS/RBC NFR AUTO: 1.7 % (ref 0.5–2)
VIT B12 SERPL-MCNC: 953 PG/ML (ref 232–1245)
WBC # BLD AUTO: 4.7 10E3/UL (ref 4–11)

## 2023-09-27 PROCEDURE — 82746 ASSAY OF FOLIC ACID SERUM: CPT | Mod: ORL | Performed by: NURSE PRACTITIONER

## 2023-09-27 PROCEDURE — 85027 COMPLETE CBC AUTOMATED: CPT | Mod: ORL | Performed by: NURSE PRACTITIONER

## 2023-09-27 PROCEDURE — 82607 VITAMIN B-12: CPT | Mod: ORL | Performed by: NURSE PRACTITIONER

## 2023-09-27 PROCEDURE — P9603 ONE-WAY ALLOW PRORATED MILES: HCPCS | Mod: ORL | Performed by: NURSE PRACTITIONER

## 2023-09-27 PROCEDURE — 36415 COLL VENOUS BLD VENIPUNCTURE: CPT | Mod: ORL | Performed by: NURSE PRACTITIONER

## 2023-09-27 PROCEDURE — 85045 AUTOMATED RETICULOCYTE COUNT: CPT | Mod: ORL | Performed by: NURSE PRACTITIONER

## 2023-09-27 PROCEDURE — 83550 IRON BINDING TEST: CPT | Mod: ORL | Performed by: NURSE PRACTITIONER

## 2023-10-30 NOTE — PROGRESS NOTES
[facility]  [facility] Record Number:  [id number]  Place of Service where encounter took place:  [facility] ([facility]) [[id number]]  Chief Complaint   Patient presents with     Hospital F/U       HPI:    Daya Ignacio  is a 88 year old (10/11/1930), who is being seen today for an episodic care visit.  HPI information obtained from: facility chart records, facility staff, patient report and [facility] chart review. Today's concern is:  Low  Back pain: patient states back pain has improved, she is using a lidocaine patch with is effective. In therapy patient is walking up to 150 feet using RW indep.   Anemia/thrombocytopenia: stable, see labs   UTI/urinary retention: no concerns with retention  CAD/HTN/CHF: BP as follows: 128/54, 130/55, 142/55 with HR  with HR 60's, weight is 119.1lbs, patient denies SOB, cough, congestion.   Past Medical and Surgical History reviewed in Epic today.    MEDICATIONS:  Current Outpatient Medications   Medication Sig Dispense Refill     acetaminophen (TYLENOL) 325 MG tablet Take 2 tablets (650 mg) by mouth every 4 hours as needed for mild pain       acetaminophen (TYLENOL) 500 MG tablet Take 1,000 mg by mouth every 6 hours       amLODIPine (NORVASC) 10 MG tablet Take 1 tablet (10 mg) by mouth daily 90 tablet 1     Calcium Carbonate (CALCIUM 600 PO) Take 1 tablet by mouth 2 times daily        Cholecalciferol (VITAMIN D3) 2000 UNITS CAPS Take 2,000 Units by mouth daily (with dinner)       cloNIDine (CATAPRES) 0.1 MG tablet TAKE ONE TABLET BY MOUTH TWO TIMES A  tablet 0     estradiol (ESTRACE) 0.1 MG/GM vaginal cream Place 2 g vaginally three times a week paraben-free 6 g 3     fish oil-omega-3 fatty acids 1000 MG capsule Take 1 g by mouth daily       gabapentin (NEURONTIN) 100 MG capsule Take 2 capsules (200 mg) by mouth 2 times daily 120 capsule 0     isosorbide dinitrate (ISORDIL) 10 MG tablet Take 1 tablet (10 mg) by mouth 3 times daily 270 tablet 3      Lidocaine (LIDOCARE) 4 % Patch Apply to low back topically one time a day for pain and remove per schedule       loperamide (IMODIUM) 2 MG capsule Take 1 capsule (2 mg) by mouth 4 times daily as needed for diarrhea       metoprolol succinate ER (TOPROL-XL) 100 MG 24 hr tablet Take 1 tablet (100 mg) by mouth daily 90 tablet 1     Multiple Vitamins-Minerals (PRESERVISION AREDS) CAPS Take 1 capsule by mouth 2 times daily        ondansetron (ZOFRAN-ODT) 4 MG ODT tab Take 1 tablet (4 mg) by mouth every 6 hours as needed for nausea or vomiting       sertraline (ZOLOFT) 100 MG tablet Take 1 tablet (100 mg) by mouth daily 90 tablet 1     tamsulosin (FLOMAX) 0.4 MG capsule Take 1 capsule (0.4 mg) by mouth daily       vitamin C (ASCORBIC ACID) 500 MG tablet Take 500 mg by mouth daily           REVIEW OF SYSTEMS:  10 point ROS of systems including Constitutional, Eyes, Respiratory, Cardiovascular, Gastroenterology, Genitourinary, Integumentary, Musculoskeletal, Psychiatric were all negative except for pertinent positives noted in my HPI.    Objective:  /54   Pulse 64   Temp 97  F (36.1  C)   Resp 16   Wt 54 kg (119 lb 1.6 oz)   SpO2 98%   BMI 22.50 kg/m    Exam:  Exam:  GENERAL APPEARANCE:  Alert, in no distress, thin  ENT:  Mouth and posterior oropharynx normal, moist mucous membranes, [place]  EYES:  EOM, conjunctivae, lids, pupils and irises normal, PERRL  RESP:  respiratory effort and palpation of chest normal, lungs clear to auscultation , no respiratory distress  CV:  Palpation and auscultation of heart done , regular rate and rhythm, no murmur, rub, or gallop, no edema  ABDOMEN:  normal bowel sounds, soft, nontender, no hepatosplenomegaly or other masses  M/S:   Examination of:   right upper extremity, left upper extremity, right lower extremity and left lower extremity  Inspection, ROM, stability and muscle strength normal and generalized weakness noted  SKIN:  Inspection of skin and subcutaneous tissue  baseline  NEURO:   Cranial nerves 2-12 are normal tested and grossly at patient's baseline, speech WNL  PSYCH:  affect and mood normal    Labs:   Recent labs in [facility] reviewed by me today.       ASSESSMENT/PLAN:  Low back pain/  Ongoing: tylenol 1000mg q 6 hours while awake, increase neurontin to 200mg TID, continue lidocaine patch 4% to low back, on AM and Off Hs. Continue PT and OT for strengthening    Generalized weakness  Urinary retention  Personal history of urinary tract infection  Acute/ongoing: no recent Abx for UTI, urology consult, cystocopy WNL, continue flomax 0.4mg QD, uses estrace vaginal cream M-W-F  No further PVR  Urology appt as directed     Anemia due to blood loss, acute  Thrombocytopenia (H)  Acute/ongoing: CBC follow,   Hgb 8.1 at discharge and Plt 100K, stable in TCU     Essential hypertension  Coronary artery disease involving native coronary artery of native heart without angina pectoris  Heart failure with preserved ejection fraction, unspecified HF chronicity (H)  Ongoing: daily weights, vitals daily and prn, BMP follow, continue toprol xl 100mg QD, clonidine 0.1mg BID, norvasc 10mg QD, isordil 10mg TID      Diarrhea, unspecified type  Hx of Clostridium difficile infection  Acute/ongoing: continue imodium 2mg QID prn    Orders written by provider at facility:   No new orders today on exam     Electronically signed by:  Tonya Lynn Haase, APRN CNP          Wheelchair

## 2024-01-01 ENCOUNTER — DOCUMENTATION ONLY (OUTPATIENT)
Dept: GERIATRICS | Facility: CLINIC | Age: 89
End: 2024-01-01

## 2024-05-01 ENCOUNTER — APPOINTMENT (OUTPATIENT)
Dept: CT IMAGING | Facility: CLINIC | Age: 89
DRG: 493 | End: 2024-05-01
Attending: EMERGENCY MEDICINE
Payer: COMMERCIAL

## 2024-05-01 ENCOUNTER — HOSPITAL ENCOUNTER (INPATIENT)
Facility: CLINIC | Age: 89
LOS: 6 days | Discharge: NURSING, REHAB OR LONG-TERM CARE | DRG: 493 | End: 2024-05-08
Attending: EMERGENCY MEDICINE | Admitting: INTERNAL MEDICINE
Payer: COMMERCIAL

## 2024-05-01 ENCOUNTER — APPOINTMENT (OUTPATIENT)
Dept: GENERAL RADIOLOGY | Facility: CLINIC | Age: 89
DRG: 493 | End: 2024-05-01
Attending: EMERGENCY MEDICINE
Payer: COMMERCIAL

## 2024-05-01 DIAGNOSIS — S82.832A FRACTURE OF DISTAL END OF TIBIA WITH FIBULA, LEFT, CLOSED, INITIAL ENCOUNTER: ICD-10-CM

## 2024-05-01 DIAGNOSIS — S82.302A FRACTURE OF DISTAL END OF TIBIA WITH FIBULA, LEFT, CLOSED, INITIAL ENCOUNTER: ICD-10-CM

## 2024-05-01 DIAGNOSIS — J96.01 ACUTE RESPIRATORY FAILURE WITH HYPOXIA (H): ICD-10-CM
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
## 2024-05-01 DIAGNOSIS — J69.0 ASPIRATION PNEUMONITIS (H): Primary | ICD-10-CM

## 2024-05-01 DIAGNOSIS — H61.23 EXCESSIVE CERUMEN IN BOTH EAR CANALS: ICD-10-CM

## 2024-05-01 LAB
ANION GAP SERPL CALCULATED.3IONS-SCNC: 9 MMOL/L (ref 7–15)
BASOPHILS # BLD AUTO: 0 10E3/UL (ref 0–0.2)
BASOPHILS NFR BLD AUTO: 0 %
BUN SERPL-MCNC: 17 MG/DL (ref 8–23)
CALCIUM SERPL-MCNC: 9.3 MG/DL (ref 8.2–9.6)
CHLORIDE SERPL-SCNC: 101 MMOL/L (ref 98–107)
CREAT SERPL-MCNC: 0.75 MG/DL (ref 0.51–0.95)
DEPRECATED HCO3 PLAS-SCNC: 25 MMOL/L (ref 22–29)
EGFRCR SERPLBLD CKD-EPI 2021: 74 ML/MIN/1.73M2
EOSINOPHIL # BLD AUTO: 0.1 10E3/UL (ref 0–0.7)
EOSINOPHIL NFR BLD AUTO: 1 %
ERYTHROCYTE [DISTWIDTH] IN BLOOD BY AUTOMATED COUNT: 15.1 % (ref 10–15)
GLUCOSE SERPL-MCNC: 156 MG/DL (ref 70–99)
HCT VFR BLD AUTO: 24.3 % (ref 35–47)
HGB BLD-MCNC: 8.2 G/DL (ref 11.7–15.7)
HOLD SPECIMEN: NORMAL
HOLD SPECIMEN: NORMAL
IMM GRANULOCYTES # BLD: 0.1 10E3/UL
IMM GRANULOCYTES NFR BLD: 1 %
LYMPHOCYTES # BLD AUTO: 0.6 10E3/UL (ref 0.8–5.3)
LYMPHOCYTES NFR BLD AUTO: 7 %
MCH RBC QN AUTO: 34.9 PG (ref 26.5–33)
MCHC RBC AUTO-ENTMCNC: 33.7 G/DL (ref 31.5–36.5)
MCV RBC AUTO: 103 FL (ref 78–100)
MONOCYTES # BLD AUTO: 0.8 10E3/UL (ref 0–1.3)
MONOCYTES NFR BLD AUTO: 9 %
NEUTROPHILS # BLD AUTO: 7.8 10E3/UL (ref 1.6–8.3)
NEUTROPHILS NFR BLD AUTO: 82 %
NRBC # BLD AUTO: 0 10E3/UL
NRBC BLD AUTO-RTO: 0 /100
PLATELET # BLD AUTO: 202 10E3/UL (ref 150–450)
POTASSIUM SERPL-SCNC: 3.5 MMOL/L (ref 3.4–5.3)
RBC # BLD AUTO: 2.35 10E6/UL (ref 3.8–5.2)
SODIUM SERPL-SCNC: 135 MMOL/L (ref 135–145)
WBC # BLD AUTO: 9.5 10E3/UL (ref 4–11)

## 2024-05-01 PROCEDURE — 73600 X-RAY EXAM OF ANKLE: CPT | Mod: LT

## 2024-05-01 PROCEDURE — 250N000011 HC RX IP 250 OP 636: Performed by: EMERGENCY MEDICINE

## 2024-05-01 PROCEDURE — 93005 ELECTROCARDIOGRAM TRACING: CPT

## 2024-05-01 PROCEDURE — 99285 EMERGENCY DEPT VISIT HI MDM: CPT | Mod: 25

## 2024-05-01 PROCEDURE — 85025 COMPLETE CBC W/AUTO DIFF WBC: CPT | Performed by: EMERGENCY MEDICINE

## 2024-05-01 PROCEDURE — 96374 THER/PROPH/DIAG INJ IV PUSH: CPT

## 2024-05-01 PROCEDURE — 96375 TX/PRO/DX INJ NEW DRUG ADDON: CPT

## 2024-05-01 PROCEDURE — 70450 CT HEAD/BRAIN W/O DYE: CPT

## 2024-05-01 PROCEDURE — 80048 BASIC METABOLIC PNL TOTAL CA: CPT | Performed by: EMERGENCY MEDICINE

## 2024-05-01 PROCEDURE — 72125 CT NECK SPINE W/O DYE: CPT

## 2024-05-01 PROCEDURE — 2W3RX1Z IMMOBILIZATION OF LEFT LOWER LEG USING SPLINT: ICD-10-PCS | Performed by: EMERGENCY MEDICINE

## 2024-05-01 PROCEDURE — 29515 APPLICATION SHORT LEG SPLINT: CPT | Mod: LT

## 2024-05-01 PROCEDURE — 36415 COLL VENOUS BLD VENIPUNCTURE: CPT | Performed by: EMERGENCY MEDICINE

## 2024-05-01 RX ORDER — FENTANYL CITRATE 50 UG/ML
50 INJECTION, SOLUTION INTRAMUSCULAR; INTRAVENOUS ONCE
Status: COMPLETED | OUTPATIENT
Start: 2024-05-01 | End: 2024-05-01

## 2024-05-01 RX ORDER — MORPHINE SULFATE 4 MG/ML
4 INJECTION, SOLUTION INTRAMUSCULAR; INTRAVENOUS ONCE
Status: COMPLETED | OUTPATIENT
Start: 2024-05-01 | End: 2024-05-01

## 2024-05-01 RX ADMIN — FENTANYL CITRATE 50 MCG: 50 INJECTION, SOLUTION INTRAMUSCULAR; INTRAVENOUS at 23:40

## 2024-05-01 RX ADMIN — MORPHINE SULFATE 4 MG: 4 INJECTION, SOLUTION INTRAMUSCULAR; INTRAVENOUS at 22:28

## 2024-05-01 ASSESSMENT — ACTIVITIES OF DAILY LIVING (ADL)
ADLS_ACUITY_SCORE: 39
ADLS_ACUITY_SCORE: 39

## 2024-05-01 ASSESSMENT — COLUMBIA-SUICIDE SEVERITY RATING SCALE - C-SSRS
6. HAVE YOU EVER DONE ANYTHING, STARTED TO DO ANYTHING, OR PREPARED TO DO ANYTHING TO END YOUR LIFE?: NO
1. IN THE PAST MONTH, HAVE YOU WISHED YOU WERE DEAD OR WISHED YOU COULD GO TO SLEEP AND NOT WAKE UP?: NO
2. HAVE YOU ACTUALLY HAD ANY THOUGHTS OF KILLING YOURSELF IN THE PAST MONTH?: NO

## 2024-05-02 ENCOUNTER — APPOINTMENT (OUTPATIENT)
Dept: CT IMAGING | Facility: CLINIC | Age: 89
DRG: 493 | End: 2024-05-02
Attending: PHYSICIAN ASSISTANT
Payer: COMMERCIAL

## 2024-05-02 ENCOUNTER — APPOINTMENT (OUTPATIENT)
Dept: GENERAL RADIOLOGY | Facility: CLINIC | Age: 89
DRG: 493 | End: 2024-05-02
Attending: PHYSICIAN ASSISTANT
Payer: COMMERCIAL

## 2024-05-02 PROBLEM — S82.302A FRACTURE OF DISTAL END OF TIBIA WITH FIBULA, LEFT, CLOSED, INITIAL ENCOUNTER: Status: ACTIVE | Noted: 2024-05-02

## 2024-05-02 PROBLEM — S82.832A FRACTURE OF DISTAL END OF TIBIA WITH FIBULA, LEFT, CLOSED, INITIAL ENCOUNTER: Status: ACTIVE | Noted: 2024-05-02
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
## 2024-05-02 LAB
ABO/RH(D): NORMAL
ANION GAP SERPL CALCULATED.3IONS-SCNC: 11 MMOL/L (ref 7–15)
ANTIBODY SCREEN: NEGATIVE
ATRIAL RATE - MUSE: 59 BPM
BUN SERPL-MCNC: 16.4 MG/DL (ref 8–23)
CALCIUM SERPL-MCNC: 9.1 MG/DL (ref 8.2–9.6)
CHLORIDE SERPL-SCNC: 102 MMOL/L (ref 98–107)
CREAT SERPL-MCNC: 0.48 MG/DL (ref 0.51–0.95)
DEPRECATED HCO3 PLAS-SCNC: 24 MMOL/L (ref 22–29)
DIASTOLIC BLOOD PRESSURE - MUSE: NORMAL MMHG
EGFRCR SERPLBLD CKD-EPI 2021: 88 ML/MIN/1.73M2
ERYTHROCYTE [DISTWIDTH] IN BLOOD BY AUTOMATED COUNT: 15 % (ref 10–15)
GLUCOSE SERPL-MCNC: 97 MG/DL (ref 70–99)
HCT VFR BLD AUTO: 26.5 % (ref 35–47)
HGB BLD-MCNC: 9.1 G/DL (ref 11.7–15.7)
INR PPP: 1.12 (ref 0.85–1.15)
INTERPRETATION ECG - MUSE: NORMAL
MCH RBC QN AUTO: 35.3 PG (ref 26.5–33)
MCHC RBC AUTO-ENTMCNC: 34.3 G/DL (ref 31.5–36.5)
MCV RBC AUTO: 103 FL (ref 78–100)
P AXIS - MUSE: 75 DEGREES
PLATELET # BLD AUTO: 194 10E3/UL (ref 150–450)
POTASSIUM SERPL-SCNC: 3.6 MMOL/L (ref 3.4–5.3)
PR INTERVAL - MUSE: 160 MS
QRS DURATION - MUSE: 82 MS
QT - MUSE: 454 MS
QTC - MUSE: 449 MS
R AXIS - MUSE: -24 DEGREES
RBC # BLD AUTO: 2.58 10E6/UL (ref 3.8–5.2)
SODIUM SERPL-SCNC: 137 MMOL/L (ref 135–145)
SPECIMEN EXPIRATION DATE: NORMAL
SYSTOLIC BLOOD PRESSURE - MUSE: NORMAL MMHG
T AXIS - MUSE: 53 DEGREES
VENTRICULAR RATE- MUSE: 59 BPM
WBC # BLD AUTO: 8.6 10E3/UL (ref 4–11)

## 2024-05-02 PROCEDURE — 85610 PROTHROMBIN TIME: CPT | Performed by: INTERNAL MEDICINE

## 2024-05-02 PROCEDURE — 82306 VITAMIN D 25 HYDROXY: CPT | Performed by: PHYSICIAN ASSISTANT

## 2024-05-02 PROCEDURE — 99223 1ST HOSP IP/OBS HIGH 75: CPT | Performed by: INTERNAL MEDICINE

## 2024-05-02 PROCEDURE — 80048 BASIC METABOLIC PNL TOTAL CA: CPT | Performed by: INTERNAL MEDICINE

## 2024-05-02 PROCEDURE — 36415 COLL VENOUS BLD VENIPUNCTURE: CPT | Performed by: INTERNAL MEDICINE

## 2024-05-02 PROCEDURE — 86923 COMPATIBILITY TEST ELECTRIC: CPT | Performed by: STUDENT IN AN ORGANIZED HEALTH CARE EDUCATION/TRAINING PROGRAM

## 2024-05-02 PROCEDURE — 258N000003 HC RX IP 258 OP 636: Performed by: INTERNAL MEDICINE

## 2024-05-02 PROCEDURE — 73590 X-RAY EXAM OF LOWER LEG: CPT | Mod: LT

## 2024-05-02 PROCEDURE — 85027 COMPLETE CBC AUTOMATED: CPT | Performed by: INTERNAL MEDICINE

## 2024-05-02 PROCEDURE — 250N000013 HC RX MED GY IP 250 OP 250 PS 637: Performed by: INTERNAL MEDICINE

## 2024-05-02 PROCEDURE — 86900 BLOOD TYPING SEROLOGIC ABO: CPT | Performed by: INTERNAL MEDICINE

## 2024-05-02 PROCEDURE — 250N000011 HC RX IP 250 OP 636: Performed by: INTERNAL MEDICINE

## 2024-05-02 PROCEDURE — 73700 CT LOWER EXTREMITY W/O DYE: CPT | Mod: LT

## 2024-05-02 PROCEDURE — 120N000001 HC R&B MED SURG/OB

## 2024-05-02 RX ORDER — CALCIUM CARBONATE 500 MG/1
1000 TABLET, CHEWABLE ORAL 4 TIMES DAILY PRN
Status: DISCONTINUED | OUTPATIENT
Start: 2024-05-02 | End: 2024-05-08 | Stop reason: HOSPADM

## 2024-05-02 RX ORDER — PROCHLORPERAZINE 25 MG
12.5 SUPPOSITORY, RECTAL RECTAL EVERY 12 HOURS PRN
Status: DISCONTINUED | OUTPATIENT
Start: 2024-05-02 | End: 2024-05-03

## 2024-05-02 RX ORDER — CLONIDINE HYDROCHLORIDE 0.1 MG/1
0.2 TABLET ORAL AT BEDTIME
Status: DISCONTINUED | OUTPATIENT
Start: 2024-05-02 | End: 2024-05-07

## 2024-05-02 RX ORDER — PROCHLORPERAZINE MALEATE 5 MG
5 TABLET ORAL EVERY 6 HOURS PRN
Status: DISCONTINUED | OUTPATIENT
Start: 2024-05-02 | End: 2024-05-03

## 2024-05-02 RX ORDER — LANOLIN ALCOHOL/MO/W.PET/CERES
1000 CREAM (GRAM) TOPICAL DAILY
COMMUNITY
End: 2024-07-09

## 2024-05-02 RX ORDER — AMOXICILLIN 250 MG
1 CAPSULE ORAL 2 TIMES DAILY
Status: DISCONTINUED | OUTPATIENT
Start: 2024-05-02 | End: 2024-05-03

## 2024-05-02 RX ORDER — SERTRALINE HYDROCHLORIDE 100 MG/1
100 TABLET, FILM COATED ORAL DAILY
Status: DISCONTINUED | OUTPATIENT
Start: 2024-05-02 | End: 2024-05-08 | Stop reason: HOSPADM

## 2024-05-02 RX ORDER — LIDOCAINE 40 MG/G
CREAM TOPICAL
Status: DISCONTINUED | OUTPATIENT
Start: 2024-05-02 | End: 2024-05-08 | Stop reason: HOSPADM

## 2024-05-02 RX ORDER — NALOXONE HYDROCHLORIDE 0.4 MG/ML
0.4 INJECTION, SOLUTION INTRAMUSCULAR; INTRAVENOUS; SUBCUTANEOUS
Status: DISCONTINUED | OUTPATIENT
Start: 2024-05-02 | End: 2024-05-08 | Stop reason: HOSPADM

## 2024-05-02 RX ORDER — CARVEDILOL 12.5 MG/1
12.5 TABLET ORAL 2 TIMES DAILY WITH MEALS
COMMUNITY

## 2024-05-02 RX ORDER — ONDANSETRON 2 MG/ML
4 INJECTION INTRAMUSCULAR; INTRAVENOUS EVERY 6 HOURS PRN
Status: DISCONTINUED | OUTPATIENT
Start: 2024-05-02 | End: 2024-05-03

## 2024-05-02 RX ORDER — ACETAMINOPHEN 325 MG/1
975 TABLET ORAL 3 TIMES DAILY
Status: DISCONTINUED | OUTPATIENT
Start: 2024-05-02 | End: 2024-05-03

## 2024-05-02 RX ORDER — OXYCODONE HYDROCHLORIDE 5 MG/1
5 TABLET ORAL EVERY 4 HOURS PRN
Status: DISCONTINUED | OUTPATIENT
Start: 2024-05-02 | End: 2024-05-03

## 2024-05-02 RX ORDER — HYDROXYZINE HYDROCHLORIDE 10 MG/1
10 TABLET, FILM COATED ORAL 3 TIMES DAILY PRN
Status: DISCONTINUED | OUTPATIENT
Start: 2024-05-02 | End: 2024-05-03

## 2024-05-02 RX ORDER — FERROUS GLUCONATE 324(38)MG
324 TABLET ORAL DAILY
Status: DISCONTINUED | OUTPATIENT
Start: 2024-05-03 | End: 2024-05-08 | Stop reason: HOSPADM

## 2024-05-02 RX ORDER — HYDROMORPHONE HYDROCHLORIDE 1 MG/ML
0.3 INJECTION, SOLUTION INTRAMUSCULAR; INTRAVENOUS; SUBCUTANEOUS
Status: DISCONTINUED | OUTPATIENT
Start: 2024-05-02 | End: 2024-05-03

## 2024-05-02 RX ORDER — GABAPENTIN 100 MG/1
300 CAPSULE ORAL 3 TIMES DAILY
Status: DISCONTINUED | OUTPATIENT
Start: 2024-05-02 | End: 2024-05-02

## 2024-05-02 RX ORDER — NALOXONE HYDROCHLORIDE 0.4 MG/ML
0.2 INJECTION, SOLUTION INTRAMUSCULAR; INTRAVENOUS; SUBCUTANEOUS
Status: DISCONTINUED | OUTPATIENT
Start: 2024-05-02 | End: 2024-05-08 | Stop reason: HOSPADM

## 2024-05-02 RX ORDER — HYDROMORPHONE HYDROCHLORIDE 1 MG/ML
0.3 INJECTION, SOLUTION INTRAMUSCULAR; INTRAVENOUS; SUBCUTANEOUS
Status: DISCONTINUED | OUTPATIENT
Start: 2024-05-02 | End: 2024-05-02

## 2024-05-02 RX ORDER — CARVEDILOL 12.5 MG/1
12.5 TABLET ORAL 2 TIMES DAILY WITH MEALS
Status: DISCONTINUED | OUTPATIENT
Start: 2024-05-02 | End: 2024-05-08 | Stop reason: HOSPADM

## 2024-05-02 RX ORDER — PANTOPRAZOLE SODIUM 40 MG/1
40 TABLET, DELAYED RELEASE ORAL
Status: DISCONTINUED | OUTPATIENT
Start: 2024-05-02 | End: 2024-05-08 | Stop reason: HOSPADM

## 2024-05-02 RX ORDER — ONDANSETRON 4 MG/1
4 TABLET, ORALLY DISINTEGRATING ORAL EVERY 6 HOURS PRN
Status: DISCONTINUED | OUTPATIENT
Start: 2024-05-02 | End: 2024-05-03

## 2024-05-02 RX ORDER — POLYETHYLENE GLYCOL 3350 17 G/17G
17 POWDER, FOR SOLUTION ORAL 2 TIMES DAILY PRN
Status: DISCONTINUED | OUTPATIENT
Start: 2024-05-02 | End: 2024-05-08 | Stop reason: HOSPADM

## 2024-05-02 RX ORDER — CLONIDINE HYDROCHLORIDE 0.1 MG/1
0.1 TABLET ORAL EVERY OTHER DAY
COMMUNITY
End: 2024-07-31

## 2024-05-02 RX ORDER — SACCHAROMYCES BOULARDII 250 MG
250 CAPSULE ORAL DAILY
Status: DISCONTINUED | OUTPATIENT
Start: 2024-05-02 | End: 2024-05-02

## 2024-05-02 RX ORDER — ISOSORBIDE DINITRATE 10 MG/1
10 TABLET ORAL 3 TIMES DAILY
Status: DISCONTINUED | OUTPATIENT
Start: 2024-05-02 | End: 2024-05-08 | Stop reason: HOSPADM

## 2024-05-02 RX ORDER — AMLODIPINE BESYLATE 10 MG/1
10 TABLET ORAL DAILY
Status: DISCONTINUED | OUTPATIENT
Start: 2024-05-02 | End: 2024-05-08 | Stop reason: HOSPADM

## 2024-05-02 RX ORDER — HYDROMORPHONE HYDROCHLORIDE 1 MG/ML
0.5 INJECTION, SOLUTION INTRAMUSCULAR; INTRAVENOUS; SUBCUTANEOUS
Status: DISCONTINUED | OUTPATIENT
Start: 2024-05-02 | End: 2024-05-03

## 2024-05-02 RX ORDER — HYDROXYZINE HYDROCHLORIDE 10 MG/1
10 TABLET, FILM COATED ORAL 2 TIMES DAILY
Status: DISCONTINUED | OUTPATIENT
Start: 2024-05-02 | End: 2024-05-02

## 2024-05-02 RX ORDER — SODIUM CHLORIDE, SODIUM LACTATE, POTASSIUM CHLORIDE, CALCIUM CHLORIDE 600; 310; 30; 20 MG/100ML; MG/100ML; MG/100ML; MG/100ML
INJECTION, SOLUTION INTRAVENOUS CONTINUOUS
Status: DISCONTINUED | OUTPATIENT
Start: 2024-05-02 | End: 2024-05-03

## 2024-05-02 RX ADMIN — HYDROMORPHONE HYDROCHLORIDE 0.5 MG: 1 INJECTION, SOLUTION INTRAMUSCULAR; INTRAVENOUS; SUBCUTANEOUS at 11:55

## 2024-05-02 RX ADMIN — ISOSORBIDE DINITRATE 10 MG: 10 TABLET ORAL at 20:18

## 2024-05-02 RX ADMIN — SENNOSIDES AND DOCUSATE SODIUM 1 TABLET: 50; 8.6 TABLET ORAL at 20:18

## 2024-05-02 RX ADMIN — HYDROMORPHONE HYDROCHLORIDE 0.5 MG: 1 INJECTION, SOLUTION INTRAMUSCULAR; INTRAVENOUS; SUBCUTANEOUS at 07:20

## 2024-05-02 RX ADMIN — AMLODIPINE BESYLATE 10 MG: 10 TABLET ORAL at 14:44

## 2024-05-02 RX ADMIN — ACETAMINOPHEN 975 MG: 325 TABLET, FILM COATED ORAL at 20:18

## 2024-05-02 RX ADMIN — HYDROMORPHONE HYDROCHLORIDE 0.3 MG: 1 INJECTION, SOLUTION INTRAMUSCULAR; INTRAVENOUS; SUBCUTANEOUS at 05:05

## 2024-05-02 RX ADMIN — SODIUM CHLORIDE, POTASSIUM CHLORIDE, SODIUM LACTATE AND CALCIUM CHLORIDE: 600; 310; 30; 20 INJECTION, SOLUTION INTRAVENOUS at 04:53

## 2024-05-02 RX ADMIN — ISOSORBIDE DINITRATE 10 MG: 10 TABLET ORAL at 14:45

## 2024-05-02 RX ADMIN — HYDROMORPHONE HYDROCHLORIDE 0.5 MG: 1 INJECTION, SOLUTION INTRAMUSCULAR; INTRAVENOUS; SUBCUTANEOUS at 16:01

## 2024-05-02 RX ADMIN — HYDROMORPHONE HYDROCHLORIDE 0.5 MG: 1 INJECTION, SOLUTION INTRAMUSCULAR; INTRAVENOUS; SUBCUTANEOUS at 18:25

## 2024-05-02 RX ADMIN — CARVEDILOL 12.5 MG: 12.5 TABLET, FILM COATED ORAL at 18:44

## 2024-05-02 RX ADMIN — PANTOPRAZOLE SODIUM 40 MG: 40 TABLET, DELAYED RELEASE ORAL at 08:47

## 2024-05-02 RX ADMIN — ACETAMINOPHEN 975 MG: 325 TABLET, FILM COATED ORAL at 14:44

## 2024-05-02 RX ADMIN — OXYCODONE HYDROCHLORIDE 5 MG: 5 TABLET ORAL at 06:40

## 2024-05-02 RX ADMIN — SERTRALINE HYDROCHLORIDE 100 MG: 100 TABLET ORAL at 14:45

## 2024-05-02 ASSESSMENT — ACTIVITIES OF DAILY LIVING (ADL)
ADLS_ACUITY_SCORE: 40
WALKING_OR_CLIMBING_STAIRS_DIFFICULTY: YES
ADLS_ACUITY_SCORE: 40
ADLS_ACUITY_SCORE: 43
TOILETING_ASSISTANCE: TOILETING DIFFICULTY, ASSISTANCE 1 PERSON
WEAR_GLASSES_OR_BLIND: YES
ADLS_ACUITY_SCORE: 39
TOILETING: 1-->ASSISTANCE (EQUIPMENT/PERSON) NEEDED
ADLS_ACUITY_SCORE: 44
ADLS_ACUITY_SCORE: 43
ADLS_ACUITY_SCORE: 39
DIFFICULTY_COMMUNICATING: NO
DEPENDENT_IADLS:: CLEANING;COOKING;LAUNDRY;MEAL PREPARATION;MEDICATION MANAGEMENT;TRANSPORTATION
HEARING_DIFFICULTY_OR_DEAF: YES
ADLS_ACUITY_SCORE: 39
ADLS_ACUITY_SCORE: 39
ADLS_ACUITY_SCORE: 40
FALL_HISTORY_WITHIN_LAST_SIX_MONTHS: YES
ADLS_ACUITY_SCORE: 43
ADLS_ACUITY_SCORE: 40
ADLS_ACUITY_SCORE: 39
CONCENTRATING,_REMEMBERING_OR_MAKING_DECISIONS_DIFFICULTY: NO
THE_FOLLOWING_AIDS_WERE_PROVIDED;: PATIENT DECLINED OFFER OF AUXILIARY AIDS
PATIENT'S_PREFERRED_MEANS_OF_COMMUNICATION: VERBAL
ADLS_ACUITY_SCORE: 43
DIFFICULTY_COMMUNICATING: NO
ADLS_ACUITY_SCORE: 39
TOILETING_ISSUES: YES
EQUIPMENT_CURRENTLY_USED_AT_HOME: WALKER, ROLLING
ADLS_ACUITY_SCORE: 40
DOING_ERRANDS_INDEPENDENTLY_DIFFICULTY: NO
CHANGE_IN_FUNCTIONAL_STATUS_SINCE_ONSET_OF_CURRENT_ILLNESS/INJURY: NO
NUMBER_OF_TIMES_PATIENT_HAS_FALLEN_WITHIN_LAST_SIX_MONTHS: 1
TOILETING: 1-->ASSISTANCE (EQUIPMENT/PERSON) NEEDED (NOT DEVELOPMENTALLY APPROPRIATE)
DRESSING/BATHING_DIFFICULTY: YES
WERE_AUXILIARY_AIDS_OFFERED?: YES
DIFFICULTY_EATING/SWALLOWING: NO
ADLS_ACUITY_SCORE: 39
DESCRIBE_HEARING_LOSS: BILATERAL HEARING LOSS

## 2024-05-02 NOTE — CONSULTS
[facility]    Orthopedic Consultation    Daya Ignacio MRN# [id number]   Age: 89 year old YOB: 1930     Date of Admission:  5/1/2024    Reason for consult: Left distal tibia and fibula shaft fractures       Requesting physician: Jermaine Pereira MD       Level of consult: Consult, follow and place orders           Assessment and Plan:   Assessment:   Acute, reportedly closed, mildly displaced, comminuted left distal tibia and distal fibula shaft fractures  Mild cognitive impairment      Plan:   The patient's history and clinical/diagnostic findings were reviewed with the oncoming on-call orthopedic surgeon, Dr. Colin Evans. The patient sustained an unwitnessed fall at her assisted living facility resulting in left distal tibia and distal fibula shaft fractures. NVI, but patient is struggling with pain control while in the ED. Confirmed functional baseline with [facility] staff who indicate patient pivot transfers with one assist and a gait belt and self-propels using a wheelchair. She does not use a lift.     Surgical intervention is recommended for the goals of fracture stabilization, pain control, and to maximize mobility/functionality postoperatively. As patient has cognitive impairment and is currently unreliable with providing history and having a meaningful conversation, Dr. Evans contacted the patient's son, Gael, by phone. Gael wishes to proceed with surgical intervention for his mother. The patient will be scheduled for a left tibia fracture ORIF using suprapatellar nail and left fibula fracture ORIF using retrograde nail on 5/3/24 pending medical optimization by the hospital team. Dr. Evans will further discuss the risks, benefits, and outcomes of surgery while obtaining consent.     -Left ankle CT and left tibia/fibula radiographs obtained and reviewed by Dr. Evans.  -NPO at midnight. Okay for diet today from an orthopedic standpoint.  -NWB/bedrest until  "postop.  -Remain in left short leg splint.   -Encourage frequent elevation (blue foam ramp ordered).  -Continue pain regimen. PRN hydroxyzine added.  -Hold any PTA anticoagulation (none per chart review).   -Vitamin D deficiency lab ordered.  -Type and screen ordered.    Please contact orthopedic trauma team if any questions or concerns arise.           Chief Complaint:   Left lower leg injury         History of Present Illness:   Medical history obtained via chart review and discussion with the patient. Patient is a poor historian, repeatedly states \"I am in pain and no one is helping me.\" RN at bedside. Daya Ignacio is a 89 year old female with past medical history of chronic back pain and spine compression fractures, HTN, mild cognitive impairment, history of UTIs and nephrolithiasis, chronic anemia, GERD, history of GI bleed, MDD, anxiety, and history of C diff who was admitted on 5/1/24 for left tibia and fibula fractures. Patient reportedly sustained an unwitnessed fall at her [facility] on 5/1/24. She was able to press her medical alert pendant for help. She does not recall the circumstances of the fall or if she had head trauma/LOC. Patient was unable to get up. She was taken to [facility] ED by EMS where x-rays demonstrated left tibia and fibula fractures of the distal shaft with comminution and mild displacement. Patient placed in a short leg splint and was admitted for possible orthopedic surgical intervention. Currently, the patient repeatedly states that her left calf and front of her lower leg hurt. Denies numbness and tingling. No known prior injuries or surgeries to her left ankle, lower leg, or knee. Per [facility] staff, the patient pivot transfers with one assist and a gait belt and self-propels using a wheelchair. She does not use a lift. No PTA anticoagulants or ASA use. No known recent illnesses. Tolerating PO intake.          Past Medical History:     Past Medical History:   Diagnosis Date    Anemia     Anxiety  "    Arthritis     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 4/16/2014    Closed fracture of multiple pubic rami, left, initial encounter (H) 10/6/2021    Closed fracture of proximal end of right tibia and fibula 8/28/2019    Depression     h/o Clostridium difficile colitis 06/2019    Hip fracture (H) 2/28/2011    History of blood transfusion     Hypertension     Kidney stone     Lumbar compression fracture (H)     Macular degeneration (senile) of retina, unspecified     Mild cognitive impairment     Mumps     NSTEMI (non-ST elevated myocardial infarction) (H) 5/28/2017    Other chronic pain     low back    Recurrent UTI     Sepsis secondary to UTI (H) 7/29/2019    Upper GI bleed 06/2019    Urinary tract infection due to extended-spectrum beta lactamase (ESBL)-producing Klebsiella 05/2019    resistant to Bactrim             Past Surgical History:     Past Surgical History:   Procedure Laterality Date    Cataract removal NOS Bilateral     COMBINED CYSTOSCOPY, INSERT STENT URETER(S) Left 5/6/2020    Procedure: Cystoscopy with left ureteral stent insertion;  Surgeon: Gaurav Munson MD;  Location:  OR    CYSTOSCOPY N/A 8/1/2019    Procedure: Exam under anesthesia, video cystopanendoscopy;  Surgeon: Dennis Carlson MD;  Location:  OR    ESOPHAGOSCOPY, GASTROSCOPY, DUODENOSCOPY (EGD), COMBINED N/A 6/11/2019    Procedure: ESOPHAGOGASTRODUODENOSCOPY (EGD);  Surgeon: Gaurav Degroot MD;  Location:  GI    EXTRACORPOREAL SHOCK WAVE LITHOTRIPSY (ESWL) Left 5/28/2020    Procedure: LEFT EXTRACORPOREAL SHOCK WAVE LITHOTRIPSY;  Surgeon: Gaurav Munson MD;  Location:  OR    Hip Pinning procedure Left     Hip replacement NOS Right     LASER HOLMIUM LITHOTRIPSY URETER(S), INSERT STENT, COMBINED Left 10/30/2020    Procedure: Cystoscopy, left ureteral stent exchange, left retrograde pyelogram, interpretation of fluoroscopic images, left ureteroscopy with holmium lithotripsy and stone basketing;  Surgeon:  Gaurav Munson MD;  Location: [facility] OR    OPEN REDUCTION INTERNAL FIXATION TIBIAL PLATEAU Right 8/28/2019    Procedure: Open reduction internal fixation right proximal tibia fracture;  Surgeon: Molina Parod MD;  Location: [facility] OR    TONSILLECTOMY, ADENOIDECTOMY, COMBINED               Social History:     Social History     Tobacco Use    Smoking status: Former     Types: Cigarettes    Smokeless tobacco: Never   Substance Use Topics    Alcohol use: No             Family History:     Family History   Problem Relation Age of Onset    Alzheimer Disease Mother         mild    Cardiovascular Father         heart attack    Neurologic Disorder Brother 83        Parkinson's             Immunizations:     VACCINE/DOSE   Diptheria   DPT   DTAP   HBIG   Hepatitis A   Hepatitis B   HIB   Influenza   Measles   Meningococcal   MMR   Mumps   Pneumococcal   Polio   Rubella   Small Pox   TDAP   Varicella   Zoster             Allergies:     Allergies   Allergen Reactions    Atorvastatin Muscle Pain (Myalgia)    Macrobid [Nitrofurantoin Anhydrous] Other (See Comments)     Body aches    Nitrofurantoin Muscle Pain (Myalgia) and Unknown     Body aches      Penicillins Unknown    Seasonal Allergies Unknown    Sulfa Antibiotics Other (See Comments)     Tolerated Bactrim May 2019  Entire family has allergy to Sulfa    Sulfasalazine Unknown             Medications:     Current Facility-Administered Medications   Medication Dose Route Frequency Provider Last Rate Last Admin    acetaminophen (TYLENOL) tablet 975 mg  975 mg Oral TID Jermaine Pereira MD        amLODIPine (NORVASC) tablet 10 mg  10 mg Oral Daily Jermaine Pereira MD        calcium carbonate (TUMS) chewable tablet 1,000 mg  1,000 mg Oral 4x Daily PRN Jermaine Pereira MD        carvedilol (COREG) tablet 12.5 mg  12.5 mg Oral BID w/meals Jermaine Pereira MD        [Held by provider] cloNIDine (CATAPRES) tablet 0.2 mg  0.2 mg Oral At Bedtime  Jermaine Pereira MD        Ferrous Gluconate TABS 324 mg  1 tablet Oral Daily Jermaine Pereira MD        gabapentin (NEURONTIN) capsule 300 mg  300 mg Oral TID Jermaine Pereira MD        HYDROmorphone (PF) (DILAUDID) injection 0.3 mg  0.3 mg Intravenous Q2H PRN Jermaine Pereira MD        HYDROmorphone (PF) (DILAUDID) injection 0.5 mg  0.5 mg Intravenous Q2H PRN Jermaine Pereira MD   0.5 mg at 05/02/24 1155    hydrOXYzine HCl (ATARAX) tablet 10 mg  10 mg Oral BID Jermaine Pereira MD        hydrOXYzine HCl (ATARAX) tablet 10 mg  10 mg Oral TID PRN Janice Montiel PA-C        isosorbide dinitrate (ISORDIL) tablet 10 mg  10 mg Oral TID Jermaine Pereira MD        lactated ringers infusion   Intravenous Continuous Jermaine Pereira MD 75 mL/hr at 05/02/24 1230 Rate Verify at 05/02/24 1230    lidocaine (LMX4) cream   Topical Q1H PRN Jermaine Pereira MD        lidocaine 1 % 0.1-1 mL  0.1-1 mL Other Q1H PRN Jermaine Pereira MD        [Held by provider] lisinopril (ZESTRIL) tablet 30 mg  30 mg Oral Daily Jermaine Pereira MD        melatonin tablet 1 mg  1 mg Oral At Bedtime PRN Jermaine Pereira MD        ondansetron (ZOFRAN ODT) ODT tab 4 mg  4 mg Oral Q6H PRN Jermaine Pereira MD        Or    ondansetron (ZOFRAN) injection 4 mg  4 mg Intravenous Q6H PRN Jermaine Pereira MD        oxyCODONE (ROXICODONE) tablet 5 mg  5 mg Oral Q4H PRN Jermaine Pereira MD   5 mg at 05/02/24 0640    oxyCODONE IR (ROXICODONE) half-tab 2.5 mg  2.5 mg Oral Q4H PRN Jermaine Pereira MD        pantoprazole (PROTONIX) EC tablet 40 mg  40 mg Oral QAM AC Jermaine Pereira MD   40 mg at 05/02/24 0847    polyethylene glycol (MIRALAX) Packet 17 g  17 g Oral BID PRN Jermaine Pereira MD        prochlorperazine (COMPAZINE) injection 5 mg  5 mg Intravenous Q6H PRN Jermaine Pereira MD        Or    prochlorperazine (COMPAZINE) tablet 5 mg  5 mg  Oral Q6H PRN Jermaine Pereira MD        Or    prochlorperazine (COMPAZINE) suppository 12.5 mg  12.5 mg Rectal Q12H PRN Jermaine Pereira MD        saccharomyces boulardii (FLORASTOR) capsule 250 mg  250 mg Oral Daily Jermaine Pereira MD        SENNA-docusate sodium (SENNA S) 8.6-50 MG TABS 1 tablet  1 tablet Oral BID Jermaine Pereira MD        sertraline (ZOLOFT) tablet 100 mg  100 mg Oral Daily Jermaine Pereira MD        sodium chloride (PF) 0.9% PF flush 3 mL  3 mL Intracatheter Q8H Jermaine Pereira MD        sodium chloride (PF) 0.9% PF flush 3 mL  3 mL Intracatheter q1 min prn Jermaine Pereira MD         Current Outpatient Medications   Medication Sig Dispense Refill    acetaminophen (TYLENOL) 325 MG tablet Take 650 mg by mouth 3 times daily      amLODIPine (NORVASC) 10 MG tablet Take 10 mg by mouth daily      carvedilol (COREG) 25 MG tablet Take 25 mg by mouth At Bedtime      carvedilol (COREG) 25 MG tablet Take 12.5 mg by mouth every morning       Cholecalciferol (VITAMIN D3) 2000 UNITS CAPS Take 2,000 Units by mouth daily       cloNIDine (CATAPRES) 0.1 MG tablet Take 2 tablets (0.2 mg) by mouth At Bedtime for 30 days 60 tablet 0    diclofenac (VOLTAREN) 1 % topical gel Place 2 g onto the skin 3 times daily Apply to right knee      Ferrous Gluconate 324 (37.5 Fe) MG TABS Take 1 tablet by mouth daily      gabapentin (NEURONTIN) 300 MG capsule Take 300 mg by mouth 3 times daily      HYDROcodone-acetaminophen (NORCO) 5-325 MG tablet Take 1 tablet by mouth 3 times daily as needed for pain      hydrOXYzine (ATARAX) 10 MG tablet Take 10 mg by mouth 2 times daily      isosorbide dinitrate (ISORDIL) 10 MG tablet Take 1 tablet (10 mg) by mouth 3 times daily 270 tablet 3    Lidocaine (LIDOCARE) 4 % Patch Place 2 patches onto the skin every 24 hours To prevent lidocaine toxicity, patient should be patch free for 12 hrs daily. 14 patch 0    lisinopril (ZESTRIL) 30 MG tablet Take  "30 mg by mouth daily      Multiple Vitamins-Minerals (PRESERVISION AREDS) CAPS Take 1 capsule by mouth 2 times daily       nystatin (MYCOSTATIN) 154708 UNIT/GM external cream Apply topically 2 times daily as needed (rash)      omeprazole (PRILOSEC) 20 MG DR capsule Take 20 mg by mouth daily       ondansetron (ZOFRAN) 4 MG tablet Take 4 mg by mouth every 8 hours as needed for nausea      polyethylene glycol (MIRALAX/GLYCOLAX) packet Take 17 g by mouth daily as needed for constipation      saccharomyces boulardii (FLORASTOR) 250 MG capsule Take 250 mg by mouth daily      SENNA-docusate sodium (SENNA S) 8.6-50 MG tablet Take 1 tablet by mouth 2 times daily      sertraline (ZOLOFT) 100 MG tablet Take 100 mg by mouth daily      vitamin C (ASCORBIC ACID) 500 MG tablet Take 500 mg by mouth daily                Review of Systems:   Unable to obtain reliable ROS due to cognitive impairment.          Physical Exam:   All vitals have been reviewed  Patient Vitals for the past 24 hrs:   BP Temp Temp src Pulse Resp SpO2 Height Weight   05/02/24 1244 -- -- -- -- -- 95 % -- --   05/02/24 1155 -- 98  F (36.7  C) Oral 83 20 95 % -- --   05/02/24 0855 -- -- -- 82 -- 95 % -- --   05/02/24 0755 (!) 160/70 97.5  F (36.4  C) Oral 86 20 92 % -- --   05/02/24 0500 (!) 163/62 97.6  F (36.4  C) Oral -- 21 96 % -- --   05/02/24 0100 137/62 98  F (36.7  C) Temporal -- -- 93 % -- --   05/02/24 0049 129/54 -- -- -- -- 94 % -- --   05/02/24 0030 117/53 -- -- 63 -- 90 % -- --   05/02/24 0015 118/66 -- -- 66 -- 94 % -- --   05/02/24 0000 124/49 -- -- 63 -- 90 % -- --   05/01/24 2345 131/53 -- -- 65 -- 97 % -- --   05/01/24 2245 104/45 -- -- 61 16 94 % -- --   05/01/24 2230 134/57 -- -- 63 22 95 % -- --   05/01/24 2218 120/47 -- -- 62 12 94 % -- --   05/01/24 2148 120/45 97.6  F (36.4  C) Oral 59 16 93 % 1.626 m (5' 4\") 49.9 kg (110 lb)       Intake/Output Summary (Last 24 hours) at 5/2/2024 1254  Last data filed at 5/2/2024 0705  Gross per 24 hour "   Intake 165 ml   Output --   Net 165 ml       Constitutional: Repetitive speech, alert, needs significant prompting to follow commands, intermittently forgetful/confused.  HEENT: Head atraumatic normocephalic. Pupils equal round and reactive.  Respiratory: Unlabored breathing no audible wheeze  Cardiovascular: Regular rate and rhythm per pulses.  GI: Abdomen is non-distended.  Lymph/Hematologic: No lymphadenopathy in areas examined.  Genitourinary: No vallecillo  Skin: No rashes, no cyanosis, no edema.  Musculoskeletal: Left lower extremity: Short leg splint is intact and appears well-fitting. No erythema or ecchymosis proximal or distal to splint. No palpable knee effusion. Exposed toe and proximal calf do not appear swollen/edematous. Proximal calf is soft and compressible. No apparent tenderness to the femoral region, medial and lateral joint lines of the knee, popliteal fossa, and exposed foot/toes. Patient able to actively flex and extend the toes on command. No increased pain with passive great toe flexion and extension. DP pulse palpable. Capillary refill 2-3 seconds. Toes are slightly cool. Sensation intact to light touch to dorsal and plantar foot.  Neurologic: GCS 15, A&O to self          Data:   All laboratory data reviewed  Results for orders placed or performed during the hospital encounter of 05/01/24   CT Head w/o Contrast     Status: None    Narrative    EXAM: CT HEAD W/O CONTRAST, CT CERVICAL SPINE W/O CONTRAST  LOCATION: [facility]  DATE: 5/1/2024    INDICATION: Unwitnessed fall.  COMPARISON: CT head from 2/7/2023 and CTA head and neck 2/6/2023.  TECHNIQUE:   1) Routine CT Head without IV contrast. Multiplanar reformats. Dose reduction techniques were used.  2) Routine CT Cervical Spine without IV contrast. Multiplanar reformats. Dose reduction techniques were used.    FINDINGS:   HEAD CT:   INTRACRANIAL CONTENTS: No intracranial hemorrhage, extraaxial collection, or mass effect.  No CT evidence of acute infarct. Mild to moderate volume loss and mild burden presumed chronic small vessel ischemia.    VISUALIZED ORBITS/SINUSES/MASTOIDS: No intraorbital abnormality. No paranasal sinus mucosal disease. No middle ear or mastoid effusion.    BONES/SOFT TISSUES: Question mild right periorbital soft tissue swelling. No fracture of the visualized facial structures. Small ovoid benign-appearing fibro-osseous structure in the right aspect of the clivus adjacent to the sphenoid sinus likely   represents arrested pneumatization as an incidental finding. It is stable.    CERVICAL SPINE CT:   VERTEBRA: The patient is osteopenic. 3 mm degenerative anterolisthesis of C3 on C4 and mild degenerative anterolisthesis C7 on T1. Alignment is otherwise normal. No acute cervical spine fracture. Marked loss of disc space height C4-C5 through C6-C7,   marked left C3-C4, C4-C5 facet arthropathy. Mild age-indeterminate mild chronic superior endplate compressions of T2-T4 again noted.     CANAL/FORAMINA: Mild to moderate C4-C5 degenerative canal narrowing, marked bilateral C3-C4, C4-C5, and on the left at C5-C6.    PARASPINAL: Multinodular thyroid gland again noted. Visualized lung fields are clear.      Impression    IMPRESSION:  HEAD CT:  1.  Question mild right periorbital soft tissue swelling. No acute intracranial hemorrhage or calvarial fracture.    2.  Age-related and chronic ischemic changes are stable.    CERVICAL SPINE CT:  1.  No acute cervical spine fracture.    2.  Mild chronic T2-T4 compressions are stable.   CT Cervical Spine w/o Contrast     Status: None    Narrative    EXAM: CT HEAD W/O CONTRAST, CT CERVICAL SPINE W/O CONTRAST  LOCATION: [facility]  DATE: 5/1/2024    INDICATION: Unwitnessed fall.  COMPARISON: CT head from 2/7/2023 and CTA head and neck 2/6/2023.  TECHNIQUE:   1) Routine CT Head without IV contrast. Multiplanar reformats. Dose reduction techniques were used.  2)  Routine CT Cervical Spine without IV contrast. Multiplanar reformats. Dose reduction techniques were used.    FINDINGS:   HEAD CT:   INTRACRANIAL CONTENTS: No intracranial hemorrhage, extraaxial collection, or mass effect. No CT evidence of acute infarct. Mild to moderate volume loss and mild burden presumed chronic small vessel ischemia.    VISUALIZED ORBITS/SINUSES/MASTOIDS: No intraorbital abnormality. No paranasal sinus mucosal disease. No middle ear or mastoid effusion.    BONES/SOFT TISSUES: Question mild right periorbital soft tissue swelling. No fracture of the visualized facial structures. Small ovoid benign-appearing fibro-osseous structure in the right aspect of the clivus adjacent to the sphenoid sinus likely   represents arrested pneumatization as an incidental finding. It is stable.    CERVICAL SPINE CT:   VERTEBRA: The patient is osteopenic. 3 mm degenerative anterolisthesis of C3 on C4 and mild degenerative anterolisthesis C7 on T1. Alignment is otherwise normal. No acute cervical spine fracture. Marked loss of disc space height C4-C5 through C6-C7,   marked left C3-C4, C4-C5 facet arthropathy. Mild age-indeterminate mild chronic superior endplate compressions of T2-T4 again noted.     CANAL/FORAMINA: Mild to moderate C4-C5 degenerative canal narrowing, marked bilateral C3-C4, C4-C5, and on the left at C5-C6.    PARASPINAL: Multinodular thyroid gland again noted. Visualized lung fields are clear.      Impression    IMPRESSION:  HEAD CT:  1.  Question mild right periorbital soft tissue swelling. No acute intracranial hemorrhage or calvarial fracture.    2.  Age-related and chronic ischemic changes are stable.    CERVICAL SPINE CT:  1.  No acute cervical spine fracture.    2.  Mild chronic T2-T4 compressions are stable.   XR Ankle Left 2 Views     Status: None    Narrative    EXAM: XR ANKLE LEFT 2 VIEWS  LOCATION: [facility]  DATE: 5/1/2024    INDICATION: fall,  deformity  COMPARISON: None.      Impression    IMPRESSION: Severely comminuted and moderately displaced fractures of the distal diaphyses of the tibia and fibula. Osteopenia. Underlying lucency about the fractures may be due to slightly asymmetric osteopenia, however pathologic fractures are not   entirely excluded. Soft tissue swelling about the distal lower extremity and ankle. Small dorsal surface calcaneal spur.   CT Ankle Left w/o Contrast     Status: None    Narrative    CT ANKLE LEFT WITHOUT CONTRAST 5/2/2024 10:50 AM     HISTORY: Left distal tib/fib fractures, CT for surgical planning  COMPARISON: Radiographs from 5/1/2024    TECHNIQUE: Volumetric helical acquisition of axial CT image data were  acquired through the left ankle without contrast. The axial image data  were used to reconstructed sagittal and coronal images. Radiation dose  for this scan was reduced using automated exposure control, adjustment  of the mA and/or kV according to patient size, or iterative  reconstruction technique.    FINDINGS:  Acute comminuted transversely oriented fractures of the distal tibial  and fibular diaphyses. There is 4 mm medial displacement of the  fibular fracture. There is mild posterior fracture apex angulation of  the tibial and fibular fractures. No intra-articular extension.    There is normal joint alignment. Slight widening of the tibiotalar  joint medially may reflect cartilage thinning although cannot exclude  a ligamentous injury. Mild degenerative changes throughout the  hindfoot. Small accessory navicular. Osteopenia. Tiny Achilles  calcaneal enthesophyte. No joint effusion.    Mild soft tissue swelling around the ankle. No discrete fluid  collection. The ankle tendons appear grossly intact. No evidence of  tendon entrapment. Intact anterior talofibular ligament. Diffuse fatty  muscle atrophy. Mild vascular calcification.      Impression    IMPRESSION:  1.  Acute comminuted fractures of the distal  tibial and fibular  diaphyses with mild displacement.  2.  Slight widening of the tibiotalar joint medially may reflect  cartilage thinning although cannot exclude a ligamentous injury.  3.  Other ancillary findings as described above.    ELMER BURNETTE MD         SYSTEM ID:  [id number]   XR Tibia and Fibula Left 2 Views     Status: None    Narrative    XR TIBIA AND FIBULA LEFT 2 VIEWS  5/2/2024 11:03 AM     HISTORY: Left distal tib fib fractures, ensure no proximal fracture  COMPARISON: None      Impression    IMPRESSION: Acute comminuted fractures of the distal tibial and  fibular diaphyses with mild displacement. No intra-articular  extension. There is normal joint alignment. Osteopenia.  Atherosclerosis.    ELMER BURNETTE MD         SYSTEM ID:  [id number]   Basic metabolic panel     Status: Abnormal   Result Value Ref Range    Sodium 135 135 - 145 mmol/L    Potassium 3.5 3.4 - 5.3 mmol/L    Chloride 101 98 - 107 mmol/L    Carbon Dioxide (CO2) 25 22 - 29 mmol/L    Anion Gap 9 7 - 15 mmol/L    Urea Nitrogen 17.0 8.0 - 23.0 mg/dL    Creatinine 0.75 0.51 - 0.95 mg/dL    GFR Estimate 74 >60 mL/min/1.73m2    Calcium 9.3 8.2 - 9.6 mg/dL    Glucose 156 (H) 70 - 99 mg/dL   CBC with platelets and differential     Status: Abnormal   Result Value Ref Range    WBC Count 9.5 4.0 - 11.0 10e3/uL    RBC Count 2.35 (L) 3.80 - 5.20 10e6/uL    Hemoglobin 8.2 (L) 11.7 - 15.7 g/dL    Hematocrit 24.3 (L) 35.0 - 47.0 %     (H) 78 - 100 fL    MCH 34.9 (H) 26.5 - 33.0 pg    MCHC 33.7 31.5 - 36.5 g/dL    RDW 15.1 (H) 10.0 - 15.0 %    Platelet Count 202 150 - 450 10e3/uL    % Neutrophils 82 %    % Lymphocytes 7 %    % Monocytes 9 %    % Eosinophils 1 %    % Basophils 0 %    % Immature Granulocytes 1 %    NRBCs per 100 WBC 0 <1 /100    Absolute Neutrophils 7.8 1.6 - 8.3 10e3/uL    Absolute Lymphocytes 0.6 (L) 0.8 - 5.3 10e3/uL    Absolute Monocytes 0.8 0.0 - 1.3 10e3/uL    Absolute Eosinophils 0.1 0.0 - 0.7 10e3/uL    Absolute  Basophils 0.0 0.0 - 0.2 10e3/uL    Absolute Immature Granulocytes 0.1 <=0.4 10e3/uL    Absolute NRBCs 0.0 10e3/uL   [facility] Draw     Status: None    Narrative    The following orders were created for panel order [facility] Draw.  Procedure                               Abnormality         Status                     ---------                               -----------         ------                     Extra Blue Top Tube[[id number]]                              Final result               Extra Red Top Tube[[id number]]                               Final result                 Please view results for these tests on the individual orders.   Extra Blue Top Tube     Status: None   Result Value Ref Range    Hold Specimen JIC    Extra Red Top Tube     Status: None   Result Value Ref Range    Hold Specimen JIC    CBC with platelets     Status: Abnormal   Result Value Ref Range    WBC Count 8.6 4.0 - 11.0 10e3/uL    RBC Count 2.58 (L) 3.80 - 5.20 10e6/uL    Hemoglobin 9.1 (L) 11.7 - 15.7 g/dL    Hematocrit 26.5 (L) 35.0 - 47.0 %     (H) 78 - 100 fL    MCH 35.3 (H) 26.5 - 33.0 pg    MCHC 34.3 31.5 - 36.5 g/dL    RDW 15.0 10.0 - 15.0 %    Platelet Count 194 150 - 450 10e3/uL   Basic metabolic panel     Status: Abnormal   Result Value Ref Range    Sodium 137 135 - 145 mmol/L    Potassium 3.6 3.4 - 5.3 mmol/L    Chloride 102 98 - 107 mmol/L    Carbon Dioxide (CO2) 24 22 - 29 mmol/L    Anion Gap 11 7 - 15 mmol/L    Urea Nitrogen 16.4 8.0 - 23.0 mg/dL    Creatinine 0.48 (L) 0.51 - 0.95 mg/dL    GFR Estimate 88 >60 mL/min/1.73m2    Calcium 9.1 8.2 - 9.6 mg/dL    Glucose 97 70 - 99 mg/dL   INR     Status: Normal   Result Value Ref Range    INR 1.12 0.85 - 1.15   EKG 12-lead, tracing only     Status: None   Result Value Ref Range    Systolic Blood Pressure  mmHg    Diastolic Blood Pressure  mmHg    Ventricular Rate 59 BPM    Atrial Rate 59 BPM    MA Interval 160 ms    QRS Duration 82 ms     ms    QTc 449 ms    P Axis 75  degrees    R AXIS -24 degrees    T Axis 53 degrees    Interpretation ECG       Sinus bradycardia  Otherwise normal ECG  When compared with ECG of 23-JAN-2023 18:25,  No significant change was found  Unconfirmed report - interpretation of this ECG is computer generated - see medical record for final interpretation  Confirmed by - EMERGENCY ROOM, PHYSICIAN ([id number]),  SOCORRO GRECO ([id number]) on 5/2/2024 6:44:41 AM     Adult Type and Screen     Status: None   Result Value Ref Range    ABO/RH(D) A NEG     Antibody Screen Negative Negative    SPECIMEN EXPIRATION DATE [id number]    CBC with platelets differential     Status: Abnormal    Narrative    The following orders were created for panel order CBC with platelets differential.  Procedure                               Abnormality         Status                     ---------                               -----------         ------                     CBC with platelets and d...[[id number]]  Abnormal            Final result                 Please view results for these tests on the individual orders.   ABO/Rh type and screen     Status: None    Narrative    The following orders were created for panel order ABO/Rh type and screen.  Procedure                               Abnormality         Status                     ---------                               -----------         ------                     Adult Type and Screen[[id number]]                            Final result                 Please view results for these tests on the individual orders.          Attestation:  I have reviewed today's vital signs, notes, medications, labs and imaging with Dr. Colin Evans.  Amount of time performed on this consult: 50 minutes.    Janice Montiel PA-C  [facility]

## 2024-05-02 NOTE — ED TRIAGE NOTES
Pt presents via EMS for evaluation after an unwitnessed fall. Pt resides in [facility] in a  facility. Pt was found in her bathroom on the ground. Unsure how she was laying. Pt pushed her pendant for staff. Staff used a fortino lift and placed pt in a chair, which is where pt was upon EMS arrival. Deformity noted to left ankle, splint placed. C-collar attempted, but unable to place so a towel roll was used. Pt c/o left ankle/shin pain 10/10. IV established by EMS. NS hung and 25 mcg fentanyl given. .

## 2024-05-02 NOTE — ED PROVIDER NOTES
History     Chief Complaint:  Fall       HPI   Daya Ignacio is a 89 year old female who presents with chief complaint left ankle pain.  Patient resides at an assisted living facility.  Her call light was on but she was found down in the bathroom complaining of ankle pain.  She denies any headache or neck pain.  Denies any chest pain or shortness of breath.  Denies any numbness or tingling.  She reports her pain is severe.  Son at bedside reports history of many orthopedic injuries, but none in the last 2 years.      Independent Historian:   Son at bedside    Review of External Notes:   none      Medications:    acetaminophen (TYLENOL) 325 MG tablet  amLODIPine (NORVASC) 10 MG tablet  carvedilol (COREG) 25 MG tablet  carvedilol (COREG) 25 MG tablet  Cholecalciferol (VITAMIN D3) 2000 UNITS CAPS  cloNIDine (CATAPRES) 0.1 MG tablet  diclofenac (VOLTAREN) 1 % topical gel  Ferrous Gluconate 324 (37.5 Fe) MG TABS  gabapentin (NEURONTIN) 300 MG capsule  HYDROcodone-acetaminophen (NORCO) 5-325 MG tablet  hydrOXYzine (ATARAX) 10 MG tablet  isosorbide dinitrate (ISORDIL) 10 MG tablet  Lidocaine (LIDOCARE) 4 % Patch  lisinopril (ZESTRIL) 30 MG tablet  Multiple Vitamins-Minerals (PRESERVISION AREDS) CAPS  nystatin (MYCOSTATIN) 546305 UNIT/GM external cream  omeprazole (PRILOSEC) 20 MG DR capsule  ondansetron (ZOFRAN) 4 MG tablet  polyethylene glycol (MIRALAX/GLYCOLAX) packet  saccharomyces boulardii (FLORASTOR) 250 MG capsule  SENNA-docusate sodium (SENNA S) 8.6-50 MG tablet  sertraline (ZOLOFT) 100 MG tablet  vitamin C (ASCORBIC ACID) 500 MG tablet        Past Medical History:    Past Medical History:   Diagnosis Date    Anemia     Anxiety     Arthritis     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 4/16/2014    Closed fracture of multiple pubic rami, left, initial encounter (H) 10/6/2021    Closed fracture of proximal end of right tibia and fibula 8/28/2019    Depression     h/o Clostridium difficile colitis 06/2019    Hip  fracture (H) 2/28/2011    History of blood transfusion     Hypertension     Kidney stone     Lumbar compression fracture (H)     Macular degeneration (senile) of retina, unspecified     Mild cognitive impairment     Mumps     NSTEMI (non-ST elevated myocardial infarction) (H) 5/28/2017    Other chronic pain     Recurrent UTI     Sepsis secondary to UTI (H) 7/29/2019    Upper GI bleed 06/2019    Urinary tract infection due to extended-spectrum beta lactamase (ESBL)-producing Klebsiella 05/2019       Past Surgical History:    Past Surgical History:   Procedure Laterality Date    Cataract removal NOS Bilateral     COMBINED CYSTOSCOPY, INSERT STENT URETER(S) Left 5/6/2020    Procedure: Cystoscopy with left ureteral stent insertion;  Surgeon: Gaurav Munson MD;  Location:  OR    CYSTOSCOPY N/A 8/1/2019    Procedure: Exam under anesthesia, video cystopanendoscopy;  Surgeon: Dennis Carlson MD;  Location:  OR    ESOPHAGOSCOPY, GASTROSCOPY, DUODENOSCOPY (EGD), COMBINED N/A 6/11/2019    Procedure: ESOPHAGOGASTRODUODENOSCOPY (EGD);  Surgeon: Gaurav Degroot MD;  Location:  GI    EXTRACORPOREAL SHOCK WAVE LITHOTRIPSY (ESWL) Left 5/28/2020    Procedure: LEFT EXTRACORPOREAL SHOCK WAVE LITHOTRIPSY;  Surgeon: Gaurav Munson MD;  Location:  OR    Hip Pinning procedure Left     Hip replacement NOS Right     LASER HOLMIUM LITHOTRIPSY URETER(S), INSERT STENT, COMBINED Left 10/30/2020    Procedure: Cystoscopy, left ureteral stent exchange, left retrograde pyelogram, interpretation of fluoroscopic images, left ureteroscopy with holmium lithotripsy and stone basketing;  Surgeon: Gaurav Munson MD;  Location:  OR    OPEN REDUCTION INTERNAL FIXATION TIBIAL PLATEAU Right 8/28/2019    Procedure: Open reduction internal fixation right proximal tibia fracture;  Surgeon: Molina Pardo MD;  Location:  OR    TONSILLECTOMY, ADENOIDECTOMY, COMBINED          Physical Exam   Patient Vitals for  "the past 24 hrs:   BP Temp Temp src Pulse Resp SpO2 Height Weight   05/02/24 0100 137/62 98  F (36.7  C) Temporal -- -- 93 % -- --   05/02/24 0049 129/54 -- -- -- -- 94 % -- --   05/02/24 0030 117/53 -- -- 63 -- 90 % -- --   05/02/24 0015 118/66 -- -- 66 -- 94 % -- --   05/02/24 0000 124/49 -- -- 63 -- 90 % -- --   05/01/24 2345 131/53 -- -- 65 -- 97 % -- --   05/01/24 2245 104/45 -- -- 61 16 94 % -- --   05/01/24 2230 134/57 -- -- 63 22 95 % -- --   05/01/24 2218 120/47 -- -- 62 12 94 % -- --   05/01/24 2148 120/45 97.6  F (36.4  C) Oral 59 16 93 % 1.626 m (5' 4\") 49.9 kg (110 lb)        Physical Exam  Nursing note and vitals reviewed.  HENT:   Mouth/Throat: Moist mucous membranes.   Eyes: EOMI, nonicteric sclera  Cardiovascular: Normal rate, regular rhythm, no murmurs, rubs, or gallops  Pulmonary/Chest: Effort normal and breath sounds normal. No respiratory distress. No wheezes. No rales.   Abdominal: Soft. Nontender, nondistended, no guarding or rigidity.   Musculoskeletal:   No midline C/T spine tenderness.  Painless range of motion bilateral shoulders, elbows, wrists, hips, knees.  Left ankle with bruising and significant pain with palpation and any movement.  Patient able to wiggle her toes.  Neurological: Alert. Moves all extremities spontaneously.  Normal sensation all 5 distal nerve distributions bilateral lower extremities.  Skin: Skin is warm and dry. No rash noted.         Emergency Department Course   ECG  ECG results from 05/01/24   EKG 12-lead, tracing only     Value    Systolic Blood Pressure     Diastolic Blood Pressure     Ventricular Rate 59    Atrial Rate 59    DE Interval 160    QRS Duration 82        QTc 449    P Axis 75    R AXIS -24    T Axis 53    Interpretation ECG      Sinus bradycardia  Otherwise normal ECG  When compared with ECG of 23-JAN-2023 18:25,  No significant change was found           Imaging:  XR Ankle Left 2 Views   Final Result   IMPRESSION: Severely comminuted and " moderately displaced fractures of the distal diaphyses of the tibia and fibula. Osteopenia. Underlying lucency about the fractures may be due to slightly asymmetric osteopenia, however pathologic fractures are not    entirely excluded. Soft tissue swelling about the distal lower extremity and ankle. Small dorsal surface calcaneal spur.      CT Head w/o Contrast   Final Result   IMPRESSION:   HEAD CT:   1.  Question mild right periorbital soft tissue swelling. No acute intracranial hemorrhage or calvarial fracture.      2.  Age-related and chronic ischemic changes are stable.      CERVICAL SPINE CT:   1.  No acute cervical spine fracture.      2.  Mild chronic T2-T4 compressions are stable.      CT Cervical Spine w/o Contrast   Final Result   IMPRESSION:   HEAD CT:   1.  Question mild right periorbital soft tissue swelling. No acute intracranial hemorrhage or calvarial fracture.      2.  Age-related and chronic ischemic changes are stable.      CERVICAL SPINE CT:   1.  No acute cervical spine fracture.      2.  Mild chronic T2-T4 compressions are stable.             Laboratory:  Labs Ordered and Resulted from Time of ED Arrival to Time of ED Departure   BASIC METABOLIC PANEL - Abnormal       Result Value    Sodium 135      Potassium 3.5      Chloride 101      Carbon Dioxide (CO2) 25      Anion Gap 9      Urea Nitrogen 17.0      Creatinine 0.75      GFR Estimate 74      Calcium 9.3      Glucose 156 (*)    CBC WITH PLATELETS AND DIFFERENTIAL - Abnormal    WBC Count 9.5      RBC Count 2.35 (*)     Hemoglobin 8.2 (*)     Hematocrit 24.3 (*)      (*)     MCH 34.9 (*)     MCHC 33.7      RDW 15.1 (*)     Platelet Count 202      % Neutrophils 82      % Lymphocytes 7      % Monocytes 9      % Eosinophils 1      % Basophils 0      % Immature Granulocytes 1      NRBCs per 100 WBC 0      Absolute Neutrophils 7.8      Absolute Lymphocytes 0.6 (*)     Absolute Monocytes 0.8      Absolute Eosinophils 0.1      Absolute  Basophils 0.0      Absolute Immature Granulocytes 0.1      Absolute NRBCs 0.0     ROUTINE UA WITH MICROSCOPIC REFLEX TO CULTURE   ABO/RH TYPE AND SCREEN            Emergency Department Course & Assessments:    Interventions:  Medications   acetaminophen (TYLENOL) tablet 975 mg (has no administration in time range)   amLODIPine (NORVASC) tablet 10 mg (has no administration in time range)   carvedilol (COREG) tablet 12.5 mg (has no administration in time range)   cloNIDine (CATAPRES) tablet 0.2 mg ( Oral Automatically Held 5/5/24 2200)   Ferrous Gluconate TABS 324 mg (has no administration in time range)   gabapentin (NEURONTIN) capsule 300 mg (has no administration in time range)   hydrOXYzine HCl (ATARAX) tablet 10 mg (has no administration in time range)   isosorbide dinitrate (ISORDIL) tablet 10 mg (has no administration in time range)   lisinopril (ZESTRIL) tablet 30 mg ( Oral Automatically Held 5/5/24 0800)   pantoprazole (PROTONIX) EC tablet 40 mg (has no administration in time range)   sertraline (ZOLOFT) tablet 100 mg (has no administration in time range)   SENNA-docusate sodium (SENNA S) 8.6-50 MG TABS 1 tablet (has no administration in time range)   saccharomyces boulardii (FLORASTOR) capsule 250 mg (has no administration in time range)   lidocaine 1 % 0.1-1 mL (has no administration in time range)   lidocaine (LMX4) cream (has no administration in time range)   sodium chloride (PF) 0.9% PF flush 3 mL (has no administration in time range)   sodium chloride (PF) 0.9% PF flush 3 mL (has no administration in time range)   calcium carbonate (TUMS) chewable tablet 1,000 mg (has no administration in time range)   HYDROmorphone (PF) (DILAUDID) injection 0.3 mg (has no administration in time range)   oxyCODONE IR (ROXICODONE) half-tab 2.5 mg (has no administration in time range)   oxyCODONE (ROXICODONE) tablet 5 mg (has no administration in time range)   melatonin tablet 1 mg (has no administration in time range)    polyethylene glycol (MIRALAX) Packet 17 g (has no administration in time range)   prochlorperazine (COMPAZINE) injection 5 mg (has no administration in time range)     Or   prochlorperazine (COMPAZINE) tablet 5 mg (has no administration in time range)     Or   prochlorperazine (COMPAZINE) suppository 12.5 mg (has no administration in time range)   ondansetron (ZOFRAN ODT) ODT tab 4 mg (has no administration in time range)     Or   ondansetron (ZOFRAN) injection 4 mg (has no administration in time range)   morphine (PF) injection 4 mg (4 mg Intravenous $Given 5/1/24 2228)   fentaNYL (PF) (SUBLIMAZE) injection 50 mcg (50 mcg Intravenous $Given 5/1/24 2340)      Procedures:     Splint Procedure Note:    Splint type: short leg posterior  Material: orthoglass  Location: left ankle fracture  Indication: Fracture    Performed by: Pedro Yoon MD    Procedure: Cast padding applied to skin with particular attention applied to bony prominences, splint applied in usual fashion.  Post splint sensation, motor, cap refill intact.        Independent Interpretation (X-rays, CTs, rhythm strip):  I independently reviewed the ankle x-ray.  Patient has left-sided distal tibia and fibula fractures.  Marked bone demineralization noted.      Consultations/Discussion of Management or Tests:    The patient's medical records were reviewed.  Nursing notes and vitals were reviewed.    I performed an exam of the patient as documented above. The patient is in agreement with my plan of care.       Social Determinants of Health affecting care:   None    Disposition:  The patient was admitted to the hospital under the care of Dr. Pereira.     Impression & Plan           Medical Decision Making:  Patient presents after fall at assisted living facility.  She is complaining of left ankle pain and has obvious bruising on exam with significant pain with any range of motion/palpation.  X-ray notable for distal tibia and fibula fracture.  No  dislocation noted.  No indication for reduction needed.  Remainder of physical exam unremarkable for other injuries.  Given age, CT head and C-spine were indicated which are fortunately negative.  I placed patient in a splint and we will plan on admission to the hospital with orthopedic consultation.  Discussed with patient and son that I suspect surgery will be offered but dependent on orthopedic opinion.  Case discussed with hospitalist service, Dr. Pereira, who accepts patient for admission.      Diagnosis:    ICD-10-CM    1. Fracture of distal end of tibia with fibula, left, closed, initial encounter  S82.302A     S82.832A       2. Fall, initial encounter  W19.XXXA                 Pedro Yoon MD  05/02/24 0221    Addendum: Added splint procedure note.      Pedro Yoon MD  05/02/24 2182

## 2024-05-02 NOTE — PLAN OF CARE
[facility]    ED Boarding Nurse Handoff Addendum Report:    Date/time: 5/2/2024, 7:39 AM    Activity Level: in bed    Fall Risk: Yes:  nonskid shoes/slippers when out of bed, arm band in place, patient and family education, and activity supervised    Active Infusions: LR @ 75 ml/hr    Current Meds Due: see MAR    Current care needs: pain management, IVF, ortho consult, NPO    Oxygen requirements (liters/min and/or FiO2): n/a    Respiratory status: Room air    Vital signs (within last 30 minutes):    Vitals:    05/02/24 0030 05/02/24 0049 05/02/24 0100 05/02/24 0500   BP: 117/53 129/54 137/62 (!) 163/62   Pulse: 63      Resp:    21   Temp:   98  F (36.7  C) 97.6  F (36.4  C)   TempSrc:   Temporal Oral   SpO2: 90% 94% 93% 96%   Weight:       Height:           Focused assessment within last 30 minutes:    Alert but intermittently confused to time, place, and situation. C/o severe pain in LLE, numbness present. Dilaudid and oxycodone ineffective per pt. LLE splinted and ace wrapper, cap refill less than 3 seconds, +1 pedal pulses. Purewick in place. In bed, NWB to LLE.      ED Boarding Nurse name: Amairani Flores RN     Risks/benefits discussed with patient/surrogate

## 2024-05-02 NOTE — PROGRESS NOTES
[facility]    ED Boarding Nurse Handoff Addendum Report:    Date/time: 5/2/2024, 4:00 PM    Activity Level: in bed    Fall Risk: Yes:  patient and family education, assistive device/personal items within reach, and mobility aid in reach    Active Infusions: LR at 75mL/hr    Current Meds Due: None    Current care needs: Continue with POC, pain management     Oxygen requirements (liters/min and/or FiO2): 2LPM    Respiratory status: Nasal cannula    Vital signs (within last 30 minutes):    Vitals:    05/02/24 1155 05/02/24 1244 05/02/24 1444 05/02/24 1546   BP:   (!) 170/74 136/61   BP Location:   Right arm Right arm   Patient Position:   Semi-Kumar's    Cuff Size:   Adult Regular    Pulse: 83  76 74   Resp: 20  20 20   Temp: 98  F (36.7  C)  98.1  F (36.7  C) 98.1  F (36.7  C)   TempSrc: Oral  Oral Oral   SpO2: 95% 95% 99% 97%   Weight:       Height:           Focused assessment within last 30 minutes:    Pt Alert with intermittent confusion. Elevated BP on 2LPM via NC. Pain managed with PRN pain medication. Voiding: Purewick in place. Tolerates regular diet, until midnight then NPO. Patient had BM this afternoon with cares provided and linen changed, changed into gown. Wedge has been placed under left leg with ice. Plan: PT/OT consult, waiting on placement       ED Boarding Nurse name: Ross Mora RN

## 2024-05-02 NOTE — ED NOTES
"[facility]  ED Nurse Handoff Report    ED Chief complaint: Fall  . ED Diagnosis:   Final diagnoses:   Fracture of distal end of tibia with fibula, left, closed, initial encounter   Fall, initial encounter       Allergies:   Allergies   Allergen Reactions    Atorvastatin Muscle Pain (Myalgia)    Macrobid [Nitrofurantoin Anhydrous] Other (See Comments)     Body aches    Nitrofurantoin Muscle Pain (Myalgia) and Unknown     Body aches      Penicillins Unknown    Seasonal Allergies Unknown    Sulfa Antibiotics Other (See Comments)     Tolerated Bactrim May 2019  Entire family has allergy to Sulfa    Sulfasalazine Unknown       Code Status: DNR / DNI    Activity level - Baseline/Home:  assist of 1.  Activity Level - Current:   assist of 1.   Lift room needed: No.   Bariatric: No   Needed: No   Isolation: No.   Infection: Not Applicable.     Respiratory status: Room air    Vital Signs (within 30 minutes):   Vitals:    05/01/24 2148 05/01/24 2218 05/01/24 2230 05/01/24 2245   BP: 120/45 120/47 134/57 104/45   Pulse: 59 62 63 61   Resp: 16 12 22 16   Temp: 97.6  F (36.4  C)      TempSrc: Oral      SpO2: 93% 94% 95% 94%   Weight: 49.9 kg (110 lb)      Height: 1.626 m (5' 4\")          Cardiac Rhythm:  ,      Pain level:    Patient confused: No.   Patient Falls Risk: patient and family education.   Elimination Status:  not in ED      Patient Report - Initial Complaint: fall.   Focused Assessment: Pt presents via EMS for evaluation after an unwitnessed fall. Pt resides in [facility] in a  facility. Pt was found in her bathroom on the ground. Unsure how she was laying. Pt pushed her pendant for staff. Staff used a fortino lift and placed pt in a chair, which is where pt was upon EMS arrival. Deformity noted to left ankle, splint placed. C-collar attempted, but unable to place so a towel roll was used. Pt c/o left ankle/shin pain 10/10. IV established by EMS. NS hung and 25 mcg fentanyl " given. .          Abnormal Results:   Labs Ordered and Resulted from Time of ED Arrival to Time of ED Departure   BASIC METABOLIC PANEL - Abnormal       Result Value    Sodium 135      Potassium 3.5      Chloride 101      Carbon Dioxide (CO2) 25      Anion Gap 9      Urea Nitrogen 17.0      Creatinine 0.75      GFR Estimate 74      Calcium 9.3      Glucose 156 (*)    CBC WITH PLATELETS AND DIFFERENTIAL - Abnormal    WBC Count 9.5      RBC Count 2.35 (*)     Hemoglobin 8.2 (*)     Hematocrit 24.3 (*)      (*)     MCH 34.9 (*)     MCHC 33.7      RDW 15.1 (*)     Platelet Count 202      % Neutrophils 82      % Lymphocytes 7      % Monocytes 9      % Eosinophils 1      % Basophils 0      % Immature Granulocytes 1      NRBCs per 100 WBC 0      Absolute Neutrophils 7.8      Absolute Lymphocytes 0.6 (*)     Absolute Monocytes 0.8      Absolute Eosinophils 0.1      Absolute Basophils 0.0      Absolute Immature Granulocytes 0.1      Absolute NRBCs 0.0     ROUTINE UA WITH MICROSCOPIC REFLEX TO CULTURE        XR Ankle Left 2 Views   Final Result   IMPRESSION: Severely comminuted and moderately displaced fractures of the distal diaphyses of the tibia and fibula. Osteopenia. Underlying lucency about the fractures may be due to slightly asymmetric osteopenia, however pathologic fractures are not    entirely excluded. Soft tissue swelling about the distal lower extremity and ankle. Small dorsal surface calcaneal spur.      CT Head w/o Contrast   Final Result   IMPRESSION:   HEAD CT:   1.  Question mild right periorbital soft tissue swelling. No acute intracranial hemorrhage or calvarial fracture.      2.  Age-related and chronic ischemic changes are stable.      CERVICAL SPINE CT:   1.  No acute cervical spine fracture.      2.  Mild chronic T2-T4 compressions are stable.      CT Cervical Spine w/o Contrast   Final Result   IMPRESSION:   HEAD CT:   1.  Question mild right periorbital soft tissue swelling. No acute  intracranial hemorrhage or calvarial fracture.      2.  Age-related and chronic ischemic changes are stable.      CERVICAL SPINE CT:   1.  No acute cervical spine fracture.      2.  Mild chronic T2-T4 compressions are stable.          Treatments provided: see MAR  Family Comments: son at bedside  OBS brochure/video discussed/provided to patient:  N/A  ED Medications:   Medications   morphine (PF) injection 4 mg (4 mg Intravenous $Given 5/1/24 6952)   fentaNYL (PF) (SUBLIMAZE) injection 50 mcg (50 mcg Intravenous $Given 5/1/24 1593)       Drips infusing:  No  For the majority of the shift this patient was Green.   Interventions performed were N/A.    Sepsis treatment initiated: No    Cares/treatment/interventions/medications to be completed following ED care: see inpatient admit orders    ED Nurse Name: Sue Manzo RN  12:23 AM

## 2024-05-02 NOTE — PHARMACY-ADMISSION MEDICATION HISTORY
Pharmacist Admission Medication History    Admission medication history is complete. The information provided in this note is only as accurate as the sources available at the time of the update.    Information Source(s): Facility ([facility]/) medication list/MAR via N/A    Pertinent Information:      Changes made to PTA medication list:  Added: Coreg 12.5 mg;Vitamin B-12 1000 mcg;  Deleted: Coreg 25 mg; Voltaren gel; Gabapentin; Norco; Hydroxyzine; Lidocaine Patch 4%; Florastor  Changed: None    Allergies reviewed with patient and updates made in EHR: no    Medication History Completed By: Reagan Sabillon [facility] 5/2/2024 2:38 PM    PTA Med List   Medication Sig Last Dose    acetaminophen (TYLENOL) 325 MG tablet Take 650 mg by mouth 3 times daily 5/1/2024 at PM    amLODIPine (NORVASC) 10 MG tablet Take 10 mg by mouth daily 5/1/2024 at AM    carvedilol (COREG) 12.5 MG tablet Take 12.5 mg by mouth 2 times daily (with meals) 5/1/2024    Cholecalciferol (VITAMIN D3) 2000 UNITS CAPS Take 2,000 Units by mouth daily  5/1/2024 at AM    cloNIDine (CATAPRES) 0.1 MG tablet Take 0.1 mg by mouth at bedtime 4/30/2024 at HS    cyanocobalamin (VITAMIN B-12) 1000 MCG tablet Take 1,000 mcg by mouth daily 5/1/2024 at AM    Ferrous Gluconate 324 (37.5 Fe) MG TABS Take 1 tablet by mouth daily 5/1/2024 at AM    isosorbide dinitrate (ISORDIL) 10 MG tablet Take 1 tablet (10 mg) by mouth 3 times daily 5/1/2024 at PM    lisinopril (ZESTRIL) 30 MG tablet Take 30 mg by mouth daily 5/1/2024 at AM    Multiple Vitamins-Minerals (PRESERVISION AREDS) CAPS Take 1 capsule by mouth 2 times daily  5/1/2024 at AM    nystatin (MYCOSTATIN) 917014 UNIT/GM external cream Apply topically 2 times daily as needed (rash) Unknown    omeprazole (PRILOSEC) 20 MG DR capsule Take 20 mg by mouth daily  5/1/2024 at AM    ondansetron (ZOFRAN) 4 MG tablet Take 4 mg by mouth every 8 hours as needed for nausea Unknown    polyethylene glycol (MIRALAX/GLYCOLAX) packet Take 17  g by mouth daily as needed for constipation Unknown    SENNA-docusate sodium (SENNA S) 8.6-50 MG tablet Take 1 tablet by mouth 2 times daily 5/1/2024 at AM    sertraline (ZOLOFT) 100 MG tablet Take 100 mg by mouth daily 5/1/2024 at AM

## 2024-05-02 NOTE — CONSULTS
Care Management Initial Consult    General Information  Assessment completed with: Care Team Member, Children, Family, Son, care team at [facility]  Type of CM/SW Visit: Initial Assessment    Primary Care Provider verified and updated as needed: No   Readmission within the last 30 days: no previous admission in last 30 days      Reason for Consult: discharge planning  Advance Care Planning:            Communication Assessment  Patient's communication style: spoken language (English or Bilingual)             Cognitive  Cognitive/Neuro/Behavioral: .WDL except, orientation  Level of Consciousness: confused  Arousal Level: opens eyes spontaneously  Orientation: disoriented to, place, situation     Best Language: 0 - No aphasia  Speech: spontaneous, clear    Living Environment:   People in home: alone, facility resident     Current living Arrangements: assisted living  Name of Facility: [facility] [phone] Fax: [phone]   Able to return to prior arrangements: yes       Family/Social Support:  Care provided by:    Provides care for: no one     Children          Description of Support System: Supportive, Involved    Support Assessment: Adequate family and caregiver support    Current Resources:   Patient receiving home care services: No     Community Resources: None  Equipment currently used at home: walker, rolling, walker, standard  Supplies currently used at home:      Employment/Financial:  Employment Status:          Financial Concerns: none   Referral to Financial Worker: No       Does the patient's insurance plan have a 3 day qualifying hospital stay waiver?  Yes     Which insurance plan 3 day waiver is available? Alternative insurance waiver    Will the waiver be used for post-acute placement? Undetermined at this time    Lifestyle & Psychosocial Needs:  Social Determinants of Health     Food Insecurity: Not on file   Depression: Not at risk (1/15/2019)    PHQ-2     PHQ-2 Score: 0   Housing  Stability: Not on file   Tobacco Use: Medium Risk (1/24/2023)    Patient History     Smoking Tobacco Use: Former     Smokeless Tobacco Use: Never     Passive Exposure: Not on file   Financial Resource Strain: Not on file   Alcohol Use: Not on file   Transportation Needs: Not on file   Physical Activity: Not on file   Interpersonal Safety: Not on file   Stress: Not on file   Social Connections: Not on file   Health Literacy: Not on file       Functional Status:  Prior to admission patient needed assistance:   Dependent ADLs:: Bathing, Transfers, Wheelchair-independent  Dependent IADLs:: Cleaning, Cooking, Laundry, Meal Preparation, Medication Management, Transportation       Mental Health Status:  Mental Health Status: No Current Concerns       Chemical Dependency Status:  Chemical Dependency Status: No Current Concerns             Values/Beliefs:  Spiritual, Cultural Beliefs, Catholic Practices, Values that affect care:                 Additional Information:  SW spoke with son Gael via phone.     Patient lives at [facility] P: [phone] Fax: [phone]. They use [facility].     Patient is an assist of 1 for transfers. She uses a wheelchair and can do it independent. Patient is able to toilet and dress self. Patient needs assistance with bathing.  Patient's MC helps with meals and medications.     Patient does not currently have home care but did in the past. She previously went to TCU but son states she did not like it.     Ortho, PT and OT consults pending. Will follow recs.     PRISCILA Maria, [facility] CenterSW  Emergency Room   Please contact the SW on the floor in which the patient is staying for any questions or concerns

## 2024-05-02 NOTE — PROGRESS NOTES
Patient seen and examined, assumed care today, H&P, labs, medications reviewed.  I agree with plan as outlined by Dr. Pereira.  -Ortho consulted input appreciated, plan for ORIF likely tomorrow.  -N.p.o. after midnight, currently on regular diet.  -Not on any anticoagulation.  -Continue current management.  -Will continue to follow-up with the patient in AM.

## 2024-05-02 NOTE — PROGRESS NOTES
Patient's son, Gael Becerra, called requesting on update for patient, requesting callback at [phone].    Gael verified patient. Update provided. Gael verbalized understanding, denies further questions, stated will stop by to see patient today.    If room change prior to visit, please call back.

## 2024-05-02 NOTE — H&P
[facility]  Hospitalist Admission Note  Name: Daya Ignacio    MRN: [id number]  YOB: 1930    Age: 89 year old  Date of admission: 5/1/2024  Primary care provider: Johann Hall    Chief Complaint:  fall, left ankle pain    Assessment and Plan:   Unwitnessed fall  Acute traumatic left tib-fib fracture  Chronic back pain  History spinal compression fractures: Unwitnessed fall in the bathroom at her halfway.  She pressed her life late for assistance.  Does not remember the details of the fall, but does not think she had loss of consciousness.  Clear deformity of the left ankle and severe pain in this area.  Bruise to the right temple.  CT head shows soft tissue swelling of the right periorbital area.  Cervical C-spine does not show any acute fracture.  X-ray of the left lower leg/ankle shows a severely comminuted and moderately displaced distal tibia and fibular fracture.  Splint applied in the ER.  Used to be on hydrocodone for her chronic back pain, but I do not see any recent prescriptions for this per [facility].  -Consult orthopedics, keep n.p.o., anticipate surgery needed  -Nonweightbearing left leg  -Scheduled acetaminophen 975 mg 3 times daily, oxycodone 2.5 mg for moderate and 5 mg severe pain every 4 hours as needed, IV Dilaudid 0.3 mg every 2 hours as needed for severe pain  -Resume her gabapentin 300 mg 3 times daily  -Consult PT/OT/SW    HTN: PTA on carvedilol 12.5 mg twice daily, lisinopril 30 mg daily, Isordil 10 mg 3 times daily, and amlodipine 10 mg daily.  May also be on clonidine at bedtime.  -Resume carvedilol, amlodipine, and Isorbid with hold parameters for low blood pressure  -Hold lisinopril initially and clonidine if taking    Mild cognitive impairment: Has some mild issues with memory per family, but does not live in memory care as initially reported in triage notes.  -Fall precautions    History of UTIs and nephrolithiasis: Obtain UA/UC, low threshold to  treat.    Chronic anemia: Hemoglobin at baseline of 8.  -CBC in the morning and type and screen    GERD  History of GI bleed:  takes omeprazole daily, use pantoprazole per formulary.    MDD  Anxiety: Resume sertraline 100 mg daily.    History of C. difficile    DVT Prophylaxis: Pneumatic Compression Devices  Code Status: DNR / DNI -consistent with previous wishes and POLST from 2021  FEN: N.p.o., LR at 75 mL/h  Discharge Dispo: Lives in [place].  Consult PT/OT/SW  Estimated Disch Date / # of Days until Disch: Admit inpatient for tib-fib fracture, anticipate surgery will be required.  Likely 2-3 night hospitalization.      History of Present Illness:  Daya Ignacio is a 89 year old female with PMH including HTN, MDD, anxiety, mild cognitive impairment, anemia, chronic back pain from compression fractures, UTI, GI bleed, nephrolithiasis, C. difficile, and falls with previous fractures including right tib/fib, pubic rami, and hip who presents from her [place] after an unwitnessed fall.  She was in the bathroom when she fell.  She does not remember exactly how she fell, but she does not think she had loss of consciousness.  She had severe pain in the left ankle.  She pressed her life alert pendant to get assistance.  Denies any new pain elsewhere.  She does have chronic back pain.  Denies any headache, lightheadedness, confusion currently.  No recent fevers, chills, dysuria.    History obtained from patient, medical record, and from Dr. Yoon in the emergency department.  Blood pressure 120/45, heart rate 60, temperature 97.6  F, oxygen 93% on room air.  BMP within normal limits.  Glucose 156.  Hemoglobin 8.2.  EKG shows sinus bradycardia.  Noncontrast head CT shows right perirectal soft tissue swelling, but no acute fracture or bleeding.  CT cervical spine shows nothing acute.  X-ray of the left lower leg/ankle shows severely comminuted and moderately displaced distal tib-fib fracture.  Splint was applied.  She received 4  mg morphine and 50 mcg of fentanyl.  Admit inpatient.  Anticipate surgical repair.       Clinically Significant Risk Factors Present on Admission                  # Hypertension: Noted on problem list                           Past Medical History reviewed:  Past Medical History:   Diagnosis Date    Anemia     Anxiety     Arthritis     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 4/16/2014    Closed fracture of multiple pubic rami, left, initial encounter (H) 10/6/2021    Closed fracture of proximal end of right tibia and fibula 8/28/2019    Depression     h/o Clostridium difficile colitis 06/2019    Hip fracture (H) 2/28/2011    History of blood transfusion     Hypertension     Kidney stone     Lumbar compression fracture (H)     Macular degeneration (senile) of retina, unspecified     Mild cognitive impairment     Mumps     NSTEMI (non-ST elevated myocardial infarction) (H) 5/28/2017    Other chronic pain     low back    Recurrent UTI     Sepsis secondary to UTI (H) 7/29/2019    Upper GI bleed 06/2019    Urinary tract infection due to extended-spectrum beta lactamase (ESBL)-producing Klebsiella 05/2019    resistant to Bactrim     Past Surgical History reviewed:  Past Surgical History:   Procedure Laterality Date    Cataract removal NOS Bilateral     COMBINED CYSTOSCOPY, INSERT STENT URETER(S) Left 5/6/2020    Procedure: Cystoscopy with left ureteral stent insertion;  Surgeon: Gaurav Munson MD;  Location:  OR    CYSTOSCOPY N/A 8/1/2019    Procedure: Exam under anesthesia, video cystopanendoscopy;  Surgeon: Dennis Carlson MD;  Location:  OR    ESOPHAGOSCOPY, GASTROSCOPY, DUODENOSCOPY (EGD), COMBINED N/A 6/11/2019    Procedure: ESOPHAGOGASTRODUODENOSCOPY (EGD);  Surgeon: Gaurav Degroot MD;  Location:  GI    EXTRACORPOREAL SHOCK WAVE LITHOTRIPSY (ESWL) Left 5/28/2020    Procedure: LEFT EXTRACORPOREAL SHOCK WAVE LITHOTRIPSY;  Surgeon: Gaurav Munson MD;  Location:  OR    Hip Pinning  procedure Left     Hip replacement NOS Right     LASER HOLMIUM LITHOTRIPSY URETER(S), INSERT STENT, COMBINED Left 10/30/2020    Procedure: Cystoscopy, left ureteral stent exchange, left retrograde pyelogram, interpretation of fluoroscopic images, left ureteroscopy with holmium lithotripsy and stone basketing;  Surgeon: Gaurav Munson MD;  Location: [facility] OR    OPEN REDUCTION INTERNAL FIXATION TIBIAL PLATEAU Right 8/28/2019    Procedure: Open reduction internal fixation right proximal tibia fracture;  Surgeon: Molina Pardo MD;  Location: [facility] OR    TONSILLECTOMY, ADENOIDECTOMY, COMBINED       Social History reviewed:  Social History     Tobacco Use    Smoking status: Former     Types: Cigarettes    Smokeless tobacco: Never   Substance Use Topics    Alcohol use: No     Social History     Social History Narrative    Not on file     Family History reviewed:  Family History   Problem Relation Age of Onset    Alzheimer Disease Mother         mild    Cardiovascular Father         heart attack    Neurologic Disorder Brother 83        Parkinson's     Allergies:  Allergies   Allergen Reactions    Atorvastatin Muscle Pain (Myalgia)    Macrobid [Nitrofurantoin Anhydrous] Other (See Comments)     Body aches    Nitrofurantoin Muscle Pain (Myalgia) and Unknown     Body aches      Penicillins Unknown    Seasonal Allergies Unknown    Sulfa Antibiotics Other (See Comments)     Tolerated Bactrim May 2019  Entire family has allergy to Sulfa    Sulfasalazine Unknown     Medications:  Prior to Admission medications    Medication Sig Last Dose Taking? Auth Provider Long Term End Date   acetaminophen (TYLENOL) 325 MG tablet Take 650 mg by mouth 3 times daily   Unknown, Entered By History     amLODIPine (NORVASC) 10 MG tablet Take 10 mg by mouth daily   Unknown, Entered By History No    carvedilol (COREG) 25 MG tablet Take 25 mg by mouth At Bedtime   Unknown, Entered By History Yes    carvedilol (COREG) 25 MG tablet Take  12.5 mg by mouth every morning    Reported, Patient Yes    Cholecalciferol (VITAMIN D3) 2000 UNITS CAPS Take 2,000 Units by mouth daily    Unknown, Entered By History     cloNIDine (CATAPRES) 0.1 MG tablet Take 2 tablets (0.2 mg) by mouth At Bedtime for 30 days   Freya Muir MD Yes 3/4/23   diclofenac (VOLTAREN) 1 % topical gel Place 2 g onto the skin 3 times daily Apply to right knee   Unknown, Entered By History     Ferrous Gluconate 324 (37.5 Fe) MG TABS Take 1 tablet by mouth daily   Unknown, Entered By History     gabapentin (NEURONTIN) 300 MG capsule Take 300 mg by mouth 3 times daily   Unknown, Entered By History No    HYDROcodone-acetaminophen (NORCO) 5-325 MG tablet Take 1 tablet by mouth 3 times daily as needed for pain   Unknown, Entered By History     hydrOXYzine (ATARAX) 10 MG tablet Take 10 mg by mouth 2 times daily   Unknown, Entered By History     isosorbide dinitrate (ISORDIL) 10 MG tablet Take 1 tablet (10 mg) by mouth 3 times daily   Marcy Soares PA-C Yes    Lidocaine (LIDOCARE) 4 % Patch Place 2 patches onto the skin every 24 hours To prevent lidocaine toxicity, patient should be patch free for 12 hrs daily.   Tanner Santo MD     lisinopril (ZESTRIL) 30 MG tablet Take 30 mg by mouth daily   Unknown, Entered By History No    Multiple Vitamins-Minerals (PRESERVISION AREDS) CAPS Take 1 capsule by mouth 2 times daily    Unknown, Entered By History     nystatin (MYCOSTATIN) 866103 UNIT/GM external cream Apply topically 2 times daily as needed (rash)   Unknown, Entered By History     omeprazole (PRILOSEC) 20 MG DR capsule Take 20 mg by mouth daily    Reported, Patient     ondansetron (ZOFRAN) 4 MG tablet Take 4 mg by mouth every 8 hours as needed for nausea   Unknown, Entered By History     polyethylene glycol (MIRALAX/GLYCOLAX) packet Take 17 g by mouth daily as needed for constipation   Gisela Evans PA-C     saccharomyces boulardii (FLORASTOR) 250 MG capsule Take 250 mg by  "mouth daily   Unknown, Entered By History     SENNA-docusate sodium (SENNA S) 8.6-50 MG tablet Take 1 tablet by mouth 2 times daily   Jhoan Baltazar MD     sertraline (ZOLOFT) 100 MG tablet Take 100 mg by mouth daily   Unknown, Entered By History No    vitamin C (ASCORBIC ACID) 500 MG tablet Take 500 mg by mouth daily    Reported, Patient       Review of Systems:  A Comprehensive greater than 10 system review of systems was carried out.  Pertinent positives and negatives are noted above.  Otherwise negative.     Physical Exam:  Blood pressure 104/45, pulse 61, temperature 97.6  F (36.4  C), temperature source Oral, resp. rate 16, height 1.626 m (5' 4\"), weight 49.9 kg (110 lb), SpO2 94%, not currently breastfeeding.  Wt Readings from Last 1 Encounters:   05/01/24 49.9 kg (110 lb)     Exam:  Constitutional: Asleep initially, somewhat frail-appearing  Eyes: sclera white   HEENT: Slight ecchymoses to the right temple  Respiratory: no respiratory distress, anterior lungs cta bilaterally, no crackles or wheeze  Cardiovascular: RRR.  No murmur appreciated  GI: non-tender, not distended, bowel sounds present  Skin: no rash   Musculoskeletal/extremities: Left lower leg is splinted.  No lower extremity edema.  Neurologic: Alert, answering some symptom-based questions properly when I woke her up.  Psychiatric: calm currently    Lab and imaging data personally reviewed:  Labs:  Recent Labs   Lab 05/01/24  2219   WBC 9.5   HGB 8.2*   HCT 24.3*   *        Recent Labs   Lab 05/01/24  2219      POTASSIUM 3.5   CHLORIDE 101   CO2 25   ANIONGAP 9   *   BUN 17.0   CR 0.75   GFRESTIMATED 74   DAVE 9.3       EKG: Sinus bradycardia    Imaging:  Recent Results (from the past 24 hour(s))   CT Cervical Spine w/o Contrast    Narrative    EXAM: CT HEAD W/O CONTRAST, CT CERVICAL SPINE W/O CONTRAST  LOCATION: [facility]  DATE: 5/1/2024    INDICATION: Unwitnessed fall.  COMPARISON: CT head " from 2/7/2023 and CTA head and neck 2/6/2023.  TECHNIQUE:   1) Routine CT Head without IV contrast. Multiplanar reformats. Dose reduction techniques were used.  2) Routine CT Cervical Spine without IV contrast. Multiplanar reformats. Dose reduction techniques were used.    FINDINGS:   HEAD CT:   INTRACRANIAL CONTENTS: No intracranial hemorrhage, extraaxial collection, or mass effect. No CT evidence of acute infarct. Mild to moderate volume loss and mild burden presumed chronic small vessel ischemia.    VISUALIZED ORBITS/SINUSES/MASTOIDS: No intraorbital abnormality. No paranasal sinus mucosal disease. No middle ear or mastoid effusion.    BONES/SOFT TISSUES: Question mild right periorbital soft tissue swelling. No fracture of the visualized facial structures. Small ovoid benign-appearing fibro-osseous structure in the right aspect of the clivus adjacent to the sphenoid sinus likely   represents arrested pneumatization as an incidental finding. It is stable.    CERVICAL SPINE CT:   VERTEBRA: The patient is osteopenic. 3 mm degenerative anterolisthesis of C3 on C4 and mild degenerative anterolisthesis C7 on T1. Alignment is otherwise normal. No acute cervical spine fracture. Marked loss of disc space height C4-C5 through C6-C7,   marked left C3-C4, C4-C5 facet arthropathy. Mild age-indeterminate mild chronic superior endplate compressions of T2-T4 again noted.     CANAL/FORAMINA: Mild to moderate C4-C5 degenerative canal narrowing, marked bilateral C3-C4, C4-C5, and on the left at C5-C6.    PARASPINAL: Multinodular thyroid gland again noted. Visualized lung fields are clear.      Impression    IMPRESSION:  HEAD CT:  1.  Question mild right periorbital soft tissue swelling. No acute intracranial hemorrhage or calvarial fracture.    2.  Age-related and chronic ischemic changes are stable.    CERVICAL SPINE CT:  1.  No acute cervical spine fracture.    2.  Mild chronic T2-T4 compressions are stable.   CT Head w/o  Contrast    Narrative    EXAM: CT HEAD W/O CONTRAST, CT CERVICAL SPINE W/O CONTRAST  LOCATION: [facility]  DATE: 5/1/2024    INDICATION: Unwitnessed fall.  COMPARISON: CT head from 2/7/2023 and CTA head and neck 2/6/2023.  TECHNIQUE:   1) Routine CT Head without IV contrast. Multiplanar reformats. Dose reduction techniques were used.  2) Routine CT Cervical Spine without IV contrast. Multiplanar reformats. Dose reduction techniques were used.    FINDINGS:   HEAD CT:   INTRACRANIAL CONTENTS: No intracranial hemorrhage, extraaxial collection, or mass effect. No CT evidence of acute infarct. Mild to moderate volume loss and mild burden presumed chronic small vessel ischemia.    VISUALIZED ORBITS/SINUSES/MASTOIDS: No intraorbital abnormality. No paranasal sinus mucosal disease. No middle ear or mastoid effusion.    BONES/SOFT TISSUES: Question mild right periorbital soft tissue swelling. No fracture of the visualized facial structures. Small ovoid benign-appearing fibro-osseous structure in the right aspect of the clivus adjacent to the sphenoid sinus likely   represents arrested pneumatization as an incidental finding. It is stable.    CERVICAL SPINE CT:   VERTEBRA: The patient is osteopenic. 3 mm degenerative anterolisthesis of C3 on C4 and mild degenerative anterolisthesis C7 on T1. Alignment is otherwise normal. No acute cervical spine fracture. Marked loss of disc space height C4-C5 through C6-C7,   marked left C3-C4, C4-C5 facet arthropathy. Mild age-indeterminate mild chronic superior endplate compressions of T2-T4 again noted.     CANAL/FORAMINA: Mild to moderate C4-C5 degenerative canal narrowing, marked bilateral C3-C4, C4-C5, and on the left at C5-C6.    PARASPINAL: Multinodular thyroid gland again noted. Visualized lung fields are clear.      Impression    IMPRESSION:  HEAD CT:  1.  Question mild right periorbital soft tissue swelling. No acute intracranial hemorrhage or calvarial  fracture.    2.  Age-related and chronic ischemic changes are stable.    CERVICAL SPINE CT:  1.  No acute cervical spine fracture.    2.  Mild chronic T2-T4 compressions are stable.   XR Ankle Left 2 Views    Narrative    EXAM: XR ANKLE LEFT 2 VIEWS  LOCATION: [facility]  DATE: 5/1/2024    INDICATION: fall, deformity  COMPARISON: None.      Impression    IMPRESSION: Severely comminuted and moderately displaced fractures of the distal diaphyses of the tibia and fibula. Osteopenia. Underlying lucency about the fractures may be due to slightly asymmetric osteopenia, however pathologic fractures are not   entirely excluded. Soft tissue swelling about the distal lower extremity and ankle. Small dorsal surface calcaneal spur.       Jermaine Pereira MD  Hospitalist  [facility]

## 2024-05-02 NOTE — UTILIZATION REVIEW
[facility] Utilization Review  Admission Status; Secondary Review Determination     Admission Date: 5/1/2024  9:41 PM      Under the authority of the Utilization Management Committee, the utilization review process indicated a secondary review on the above patient.  The review outcome is based on review of the medical records, discussions with staff, and applying clinical experience noted on the date of the review.        (X)      Inpatient Status Appropriate - This patient's medical care is consistent with medical management for inpatient care and reasonable inpatient medical practice.          RATIONALE FOR DETERMINATION   89-year-old female admitted after unwitnessed fall in the bathroom at assisted living facility.  Patient found to have acute, closed, mildly displaced comminuted left distal tibia and fibula shaft fractures.  Orthopedic surgery team recommend ORIF using suprapatellar nail and left fibula ORIF using retrograde nail on 5/3.  Currently nonweightbearing and bedrest and left short leg splint, vitamin D deficiency panel check, pain control with multiple doses of IV Dilaudid, started on IV fluid hydration, needs medical clearance prior to surgical intervention planned for tomorrow.  Complex patient who needs ORIF, pain control, further workup and postop cares with close monitoring in the hospital, and anticipated length of stay more than 2 midnight, recommend continue inpatient status    The severity of illness, intensity of service provided, expected LOS and risk for adverse outcome make the care complex, high risk and appropriate for hospital admission.The patient requires hospital based medical care which is anticipated to require a stay of 2 or more midnights.        The information on this document is developed by the utilization review team in order for the business office to ensure compliance.  This only denotes the appropriateness of proper admission status and does not reflect the  quality of care rendered.              Sincerely,       Yovanny Wright MD  Physician Advisor  Utilization Review-[place]    Phone: [phone]

## 2024-05-02 NOTE — PROGRESS NOTES
Patient's facility director, Deisi, called requesting an update on patient, callback at [phone].     Deisi verified patient, update provided. Deisi verbalized understanding and denies further questions.     Deisi requests a call to [facility] for discharge planning at [phone].

## 2024-05-03 ENCOUNTER — APPOINTMENT (OUTPATIENT)
Dept: GENERAL RADIOLOGY | Facility: CLINIC | Age: 89
DRG: 493 | End: 2024-05-03
Attending: STUDENT IN AN ORGANIZED HEALTH CARE EDUCATION/TRAINING PROGRAM
Payer: COMMERCIAL

## 2024-05-03 ENCOUNTER — ANESTHESIA (OUTPATIENT)
Dept: SURGERY | Facility: CLINIC | Age: 89
DRG: 493 | End: 2024-05-03
Payer: COMMERCIAL

## 2024-05-03 ENCOUNTER — APPOINTMENT (OUTPATIENT)
Dept: GENERAL RADIOLOGY | Facility: CLINIC | Age: 89
DRG: 493 | End: 2024-05-03
Payer: COMMERCIAL

## 2024-05-03 ENCOUNTER — ANESTHESIA EVENT (OUTPATIENT)
Dept: SURGERY | Facility: CLINIC | Age: 89
DRG: 493 | End: 2024-05-03
Payer: COMMERCIAL

## 2024-05-03 LAB
ALBUMIN UR-MCNC: 30 MG/DL
APPEARANCE UR: ABNORMAL
BACTERIA #/AREA URNS HPF: ABNORMAL /HPF
BILIRUB UR QL STRIP: NEGATIVE
COLOR UR AUTO: YELLOW
CREAT SERPL-MCNC: 0.47 MG/DL (ref 0.51–0.95)
EGFRCR SERPLBLD CKD-EPI 2021: 88 ML/MIN/1.73M2
GLUCOSE UR STRIP-MCNC: NEGATIVE MG/DL
HGB BLD-MCNC: 8 G/DL (ref 11.7–15.7)
HGB UR QL STRIP: ABNORMAL
KETONES UR STRIP-MCNC: 60 MG/DL
LEUKOCYTE ESTERASE UR QL STRIP: ABNORMAL
MUCOUS THREADS #/AREA URNS LPF: PRESENT /LPF
NITRATE UR QL: NEGATIVE
PH UR STRIP: 5.5 [PH] (ref 5–7)
RBC URINE: 46 /HPF
SP GR UR STRIP: 1.02 (ref 1–1.03)
SQUAMOUS EPITHELIAL: 1 /HPF
TRANSITIONAL EPI: 4 /HPF
UROBILINOGEN UR STRIP-MCNC: NORMAL MG/DL
VIT D+METAB SERPL-MCNC: 53 NG/ML (ref 20–50)
WBC CLUMPS #/AREA URNS HPF: PRESENT /HPF
WBC URINE: >182 /HPF
YEAST #/AREA URNS HPF: ABNORMAL /HPF

## 2024-05-03 PROCEDURE — 360N000084 HC SURGERY LEVEL 4 W/ FLUORO, PER MIN: Performed by: STUDENT IN AN ORGANIZED HEALTH CARE EDUCATION/TRAINING PROGRAM

## 2024-05-03 PROCEDURE — C1713 ANCHOR/SCREW BN/BN,TIS/BN: HCPCS | Performed by: STUDENT IN AN ORGANIZED HEALTH CARE EDUCATION/TRAINING PROGRAM

## 2024-05-03 PROCEDURE — 250N000009 HC RX 250: Performed by: ANESTHESIOLOGY

## 2024-05-03 PROCEDURE — 999N000179 XR SURGERY CARM FLUORO LESS THAN 5 MIN W STILLS: Mod: TC

## 2024-05-03 PROCEDURE — 250N000011 HC RX IP 250 OP 636: Performed by: NURSE ANESTHETIST, CERTIFIED REGISTERED

## 2024-05-03 PROCEDURE — 370N000017 HC ANESTHESIA TECHNICAL FEE, PER MIN: Performed by: STUDENT IN AN ORGANIZED HEALTH CARE EDUCATION/TRAINING PROGRAM

## 2024-05-03 PROCEDURE — 258N000003 HC RX IP 258 OP 636: Performed by: INTERNAL MEDICINE

## 2024-05-03 PROCEDURE — 36415 COLL VENOUS BLD VENIPUNCTURE: CPT | Performed by: PHYSICIAN ASSISTANT

## 2024-05-03 PROCEDURE — 81001 URINALYSIS AUTO W/SCOPE: CPT | Performed by: INTERNAL MEDICINE

## 2024-05-03 PROCEDURE — 272N000001 HC OR GENERAL SUPPLY STERILE: Performed by: STUDENT IN AN ORGANIZED HEALTH CARE EDUCATION/TRAINING PROGRAM

## 2024-05-03 PROCEDURE — 258N000003 HC RX IP 258 OP 636: Performed by: NURSE ANESTHETIST, CERTIFIED REGISTERED

## 2024-05-03 PROCEDURE — 250N000011 HC RX IP 250 OP 636: Performed by: INTERNAL MEDICINE

## 2024-05-03 PROCEDURE — 250N000025 HC SEVOFLURANE, PER MIN: Performed by: STUDENT IN AN ORGANIZED HEALTH CARE EDUCATION/TRAINING PROGRAM

## 2024-05-03 PROCEDURE — 271N000001 HC OR GENERAL SUPPLY NON-STERILE: Performed by: STUDENT IN AN ORGANIZED HEALTH CARE EDUCATION/TRAINING PROGRAM

## 2024-05-03 PROCEDURE — 250N000013 HC RX MED GY IP 250 OP 250 PS 637

## 2024-05-03 PROCEDURE — 999N000065 XR TIBIA & FIBULA PORT LEFT 2 VIEWS: Mod: LT

## 2024-05-03 PROCEDURE — 250N000009 HC RX 250: Performed by: STUDENT IN AN ORGANIZED HEALTH CARE EDUCATION/TRAINING PROGRAM

## 2024-05-03 PROCEDURE — 87086 URINE CULTURE/COLONY COUNT: CPT | Performed by: INTERNAL MEDICINE

## 2024-05-03 PROCEDURE — 120N000001 HC R&B MED SURG/OB

## 2024-05-03 PROCEDURE — C1769 GUIDE WIRE: HCPCS | Performed by: STUDENT IN AN ORGANIZED HEALTH CARE EDUCATION/TRAINING PROGRAM

## 2024-05-03 PROCEDURE — 85018 HEMOGLOBIN: CPT | Performed by: PHYSICIAN ASSISTANT

## 2024-05-03 PROCEDURE — 710N000010 HC RECOVERY PHASE 1, LEVEL 2, PER MIN: Performed by: STUDENT IN AN ORGANIZED HEALTH CARE EDUCATION/TRAINING PROGRAM

## 2024-05-03 PROCEDURE — 0QSK36Z REPOSITION LEFT FIBULA WITH INTRAMEDULLARY INTERNAL FIXATION DEVICE, PERCUTANEOUS APPROACH: ICD-10-PCS | Performed by: STUDENT IN AN ORGANIZED HEALTH CARE EDUCATION/TRAINING PROGRAM

## 2024-05-03 PROCEDURE — C1776 JOINT DEVICE (IMPLANTABLE): HCPCS | Performed by: STUDENT IN AN ORGANIZED HEALTH CARE EDUCATION/TRAINING PROGRAM

## 2024-05-03 PROCEDURE — 36415 COLL VENOUS BLD VENIPUNCTURE: CPT

## 2024-05-03 PROCEDURE — 250N000011 HC RX IP 250 OP 636: Performed by: PHYSICIAN ASSISTANT

## 2024-05-03 PROCEDURE — 999N000141 HC STATISTIC PRE-PROCEDURE NURSING ASSESSMENT: Performed by: STUDENT IN AN ORGANIZED HEALTH CARE EDUCATION/TRAINING PROGRAM

## 2024-05-03 PROCEDURE — 99232 SBSQ HOSP IP/OBS MODERATE 35: CPT | Performed by: INTERNAL MEDICINE

## 2024-05-03 PROCEDURE — 250N000011 HC RX IP 250 OP 636

## 2024-05-03 PROCEDURE — 250N000009 HC RX 250: Performed by: NURSE ANESTHETIST, CERTIFIED REGISTERED

## 2024-05-03 PROCEDURE — 82565 ASSAY OF CREATININE: CPT

## 2024-05-03 PROCEDURE — 0QSH36Z REPOSITION LEFT TIBIA WITH INTRAMEDULLARY INTERNAL FIXATION DEVICE, PERCUTANEOUS APPROACH: ICD-10-PCS | Performed by: STUDENT IN AN ORGANIZED HEALTH CARE EDUCATION/TRAINING PROGRAM

## 2024-05-03 PROCEDURE — 250N000013 HC RX MED GY IP 250 OP 250 PS 637: Performed by: INTERNAL MEDICINE

## 2024-05-03 DEVICE — WIRE FX 3MM 285MM KRSH STRL LF: Type: IMPLANTABLE DEVICE | Site: LEG | Status: FUNCTIONAL

## 2024-05-03 DEVICE — IMPLANTABLE DEVICE: Type: IMPLANTABLE DEVICE | Site: LEG | Status: FUNCTIONAL

## 2024-05-03 RX ORDER — ALBUTEROL SULFATE 0.83 MG/ML
2.5 SOLUTION RESPIRATORY (INHALATION) EVERY 4 HOURS PRN
Status: DISCONTINUED | OUTPATIENT
Start: 2024-05-03 | End: 2024-05-03 | Stop reason: HOSPADM

## 2024-05-03 RX ORDER — AMOXICILLIN 250 MG
1 CAPSULE ORAL 2 TIMES DAILY
Status: DISCONTINUED | OUTPATIENT
Start: 2024-05-03 | End: 2024-05-08 | Stop reason: HOSPADM

## 2024-05-03 RX ORDER — CEFAZOLIN SODIUM/WATER 2 G/20 ML
2 SYRINGE (ML) INTRAVENOUS SEE ADMIN INSTRUCTIONS
Status: DISCONTINUED | OUTPATIENT
Start: 2024-05-03 | End: 2024-05-03 | Stop reason: HOSPADM

## 2024-05-03 RX ORDER — BUPIVACAINE HYDROCHLORIDE AND EPINEPHRINE 2.5; 5 MG/ML; UG/ML
INJECTION, SOLUTION INFILTRATION; PERINEURAL
Status: COMPLETED | OUTPATIENT
Start: 2024-05-03 | End: 2024-05-03

## 2024-05-03 RX ORDER — CEFAZOLIN SODIUM/WATER 2 G/20 ML
2 SYRINGE (ML) INTRAVENOUS
Status: COMPLETED | OUTPATIENT
Start: 2024-05-03 | End: 2024-05-03

## 2024-05-03 RX ORDER — CEFTRIAXONE 2 G/1
2 INJECTION, POWDER, FOR SOLUTION INTRAMUSCULAR; INTRAVENOUS EVERY 24 HOURS
Status: DISCONTINUED | OUTPATIENT
Start: 2024-05-03 | End: 2024-05-04

## 2024-05-03 RX ORDER — ONDANSETRON 2 MG/ML
4 INJECTION INTRAMUSCULAR; INTRAVENOUS EVERY 30 MIN PRN
Status: DISCONTINUED | OUTPATIENT
Start: 2024-05-03 | End: 2024-05-03 | Stop reason: HOSPADM

## 2024-05-03 RX ORDER — CEFAZOLIN SODIUM 1 G/3ML
1 INJECTION, POWDER, FOR SOLUTION INTRAMUSCULAR; INTRAVENOUS EVERY 8 HOURS
Qty: 10 ML | Refills: 0 | Status: COMPLETED | OUTPATIENT
Start: 2024-05-03 | End: 2024-05-04

## 2024-05-03 RX ORDER — OXYCODONE HYDROCHLORIDE 5 MG/1
5 TABLET ORAL EVERY 4 HOURS PRN
Status: DISCONTINUED | OUTPATIENT
Start: 2024-05-03 | End: 2024-05-04

## 2024-05-03 RX ORDER — HYDROMORPHONE HCL IN WATER/PF 6 MG/30 ML
0.2 PATIENT CONTROLLED ANALGESIA SYRINGE INTRAVENOUS
Status: DISCONTINUED | OUTPATIENT
Start: 2024-05-03 | End: 2024-05-08 | Stop reason: HOSPADM

## 2024-05-03 RX ORDER — FENTANYL CITRATE 50 UG/ML
25 INJECTION, SOLUTION INTRAMUSCULAR; INTRAVENOUS EVERY 5 MIN PRN
Status: DISCONTINUED | OUTPATIENT
Start: 2024-05-03 | End: 2024-05-03 | Stop reason: HOSPADM

## 2024-05-03 RX ORDER — DEXAMETHASONE SODIUM PHOSPHATE 4 MG/ML
INJECTION, SOLUTION INTRA-ARTICULAR; INTRALESIONAL; INTRAMUSCULAR; INTRAVENOUS; SOFT TISSUE PRN
Status: DISCONTINUED | OUTPATIENT
Start: 2024-05-03 | End: 2024-05-03

## 2024-05-03 RX ORDER — BISACODYL 10 MG
10 SUPPOSITORY, RECTAL RECTAL DAILY PRN
Status: DISCONTINUED | OUTPATIENT
Start: 2024-05-06 | End: 2024-05-08 | Stop reason: HOSPADM

## 2024-05-03 RX ORDER — ONDANSETRON 2 MG/ML
4 INJECTION INTRAMUSCULAR; INTRAVENOUS EVERY 6 HOURS PRN
Status: DISCONTINUED | OUTPATIENT
Start: 2024-05-03 | End: 2024-05-08 | Stop reason: HOSPADM

## 2024-05-03 RX ORDER — TRANEXAMIC ACID 10 MG/ML
1 INJECTION, SOLUTION INTRAVENOUS ONCE
Status: DISCONTINUED | OUTPATIENT
Start: 2024-05-03 | End: 2024-05-03 | Stop reason: HOSPADM

## 2024-05-03 RX ORDER — SODIUM CHLORIDE, SODIUM LACTATE, POTASSIUM CHLORIDE, CALCIUM CHLORIDE 600; 310; 30; 20 MG/100ML; MG/100ML; MG/100ML; MG/100ML
INJECTION, SOLUTION INTRAVENOUS CONTINUOUS PRN
Status: DISCONTINUED | OUTPATIENT
Start: 2024-05-03 | End: 2024-05-03

## 2024-05-03 RX ORDER — ACETAMINOPHEN 325 MG/1
650 TABLET ORAL EVERY 4 HOURS PRN
Status: DISCONTINUED | OUTPATIENT
Start: 2024-05-06 | End: 2024-05-08 | Stop reason: HOSPADM

## 2024-05-03 RX ORDER — NALOXONE HYDROCHLORIDE 0.4 MG/ML
0.1 INJECTION, SOLUTION INTRAMUSCULAR; INTRAVENOUS; SUBCUTANEOUS
Status: DISCONTINUED | OUTPATIENT
Start: 2024-05-03 | End: 2024-05-03 | Stop reason: HOSPADM

## 2024-05-03 RX ORDER — HYDRALAZINE HYDROCHLORIDE 20 MG/ML
2.5-5 INJECTION INTRAMUSCULAR; INTRAVENOUS EVERY 10 MIN PRN
Status: DISCONTINUED | OUTPATIENT
Start: 2024-05-03 | End: 2024-05-03 | Stop reason: HOSPADM

## 2024-05-03 RX ORDER — SODIUM CHLORIDE, SODIUM LACTATE, POTASSIUM CHLORIDE, CALCIUM CHLORIDE 600; 310; 30; 20 MG/100ML; MG/100ML; MG/100ML; MG/100ML
INJECTION, SOLUTION INTRAVENOUS CONTINUOUS
Status: DISCONTINUED | OUTPATIENT
Start: 2024-05-03 | End: 2024-05-04

## 2024-05-03 RX ORDER — ONDANSETRON 2 MG/ML
INJECTION INTRAMUSCULAR; INTRAVENOUS PRN
Status: DISCONTINUED | OUTPATIENT
Start: 2024-05-03 | End: 2024-05-03

## 2024-05-03 RX ORDER — ENOXAPARIN SODIUM 100 MG/ML
30 INJECTION SUBCUTANEOUS EVERY 24 HOURS
Status: DISCONTINUED | OUTPATIENT
Start: 2024-05-04 | End: 2024-05-05

## 2024-05-03 RX ORDER — PROPOFOL 10 MG/ML
INJECTION, EMULSION INTRAVENOUS PRN
Status: DISCONTINUED | OUTPATIENT
Start: 2024-05-03 | End: 2024-05-03

## 2024-05-03 RX ORDER — LABETALOL HYDROCHLORIDE 5 MG/ML
10 INJECTION, SOLUTION INTRAVENOUS
Status: DISCONTINUED | OUTPATIENT
Start: 2024-05-03 | End: 2024-05-03 | Stop reason: HOSPADM

## 2024-05-03 RX ORDER — GLYCOPYRROLATE 0.2 MG/ML
INJECTION, SOLUTION INTRAMUSCULAR; INTRAVENOUS PRN
Status: DISCONTINUED | OUTPATIENT
Start: 2024-05-03 | End: 2024-05-03

## 2024-05-03 RX ORDER — LIDOCAINE 40 MG/G
CREAM TOPICAL
Status: DISCONTINUED | OUTPATIENT
Start: 2024-05-03 | End: 2024-05-03

## 2024-05-03 RX ORDER — HYDROXYZINE HYDROCHLORIDE 10 MG/1
10 TABLET, FILM COATED ORAL EVERY 6 HOURS PRN
Status: DISCONTINUED | OUTPATIENT
Start: 2024-05-03 | End: 2024-05-08 | Stop reason: HOSPADM

## 2024-05-03 RX ORDER — OXYCODONE HYDROCHLORIDE 10 MG/1
10 TABLET ORAL EVERY 4 HOURS PRN
Status: DISCONTINUED | OUTPATIENT
Start: 2024-05-03 | End: 2024-05-04

## 2024-05-03 RX ORDER — PROCHLORPERAZINE MALEATE 5 MG
5 TABLET ORAL EVERY 6 HOURS PRN
Status: DISCONTINUED | OUTPATIENT
Start: 2024-05-03 | End: 2024-05-08 | Stop reason: HOSPADM

## 2024-05-03 RX ORDER — FENTANYL CITRATE 50 UG/ML
50 INJECTION, SOLUTION INTRAMUSCULAR; INTRAVENOUS EVERY 5 MIN PRN
Status: DISCONTINUED | OUTPATIENT
Start: 2024-05-03 | End: 2024-05-03 | Stop reason: HOSPADM

## 2024-05-03 RX ORDER — DEXAMETHASONE SODIUM PHOSPHATE 4 MG/ML
4 INJECTION, SOLUTION INTRA-ARTICULAR; INTRALESIONAL; INTRAMUSCULAR; INTRAVENOUS; SOFT TISSUE
Status: DISCONTINUED | OUTPATIENT
Start: 2024-05-03 | End: 2024-05-03 | Stop reason: HOSPADM

## 2024-05-03 RX ORDER — FENTANYL CITRATE 50 UG/ML
INJECTION, SOLUTION INTRAMUSCULAR; INTRAVENOUS PRN
Status: DISCONTINUED | OUTPATIENT
Start: 2024-05-03 | End: 2024-05-03

## 2024-05-03 RX ORDER — HYDROMORPHONE HCL IN WATER/PF 6 MG/30 ML
0.4 PATIENT CONTROLLED ANALGESIA SYRINGE INTRAVENOUS EVERY 5 MIN PRN
Status: DISCONTINUED | OUTPATIENT
Start: 2024-05-03 | End: 2024-05-03 | Stop reason: HOSPADM

## 2024-05-03 RX ORDER — ACETAMINOPHEN 325 MG/1
975 TABLET ORAL EVERY 8 HOURS
Qty: 27 TABLET | Refills: 0 | Status: COMPLETED | OUTPATIENT
Start: 2024-05-03 | End: 2024-05-06

## 2024-05-03 RX ORDER — ONDANSETRON 4 MG/1
4 TABLET, ORALLY DISINTEGRATING ORAL EVERY 30 MIN PRN
Status: DISCONTINUED | OUTPATIENT
Start: 2024-05-03 | End: 2024-05-03 | Stop reason: HOSPADM

## 2024-05-03 RX ORDER — LIDOCAINE HYDROCHLORIDE 20 MG/ML
INJECTION, SOLUTION INFILTRATION; PERINEURAL PRN
Status: DISCONTINUED | OUTPATIENT
Start: 2024-05-03 | End: 2024-05-03

## 2024-05-03 RX ORDER — HYDROMORPHONE HCL IN WATER/PF 6 MG/30 ML
0.2 PATIENT CONTROLLED ANALGESIA SYRINGE INTRAVENOUS EVERY 5 MIN PRN
Status: DISCONTINUED | OUTPATIENT
Start: 2024-05-03 | End: 2024-05-03 | Stop reason: HOSPADM

## 2024-05-03 RX ORDER — ONDANSETRON 4 MG/1
4 TABLET, ORALLY DISINTEGRATING ORAL EVERY 6 HOURS PRN
Status: DISCONTINUED | OUTPATIENT
Start: 2024-05-03 | End: 2024-05-08 | Stop reason: HOSPADM

## 2024-05-03 RX ORDER — HYDROMORPHONE HCL IN WATER/PF 6 MG/30 ML
0.4 PATIENT CONTROLLED ANALGESIA SYRINGE INTRAVENOUS
Status: DISCONTINUED | OUTPATIENT
Start: 2024-05-03 | End: 2024-05-08 | Stop reason: HOSPADM

## 2024-05-03 RX ORDER — MAGNESIUM HYDROXIDE 1200 MG/15ML
LIQUID ORAL PRN
Status: DISCONTINUED | OUTPATIENT
Start: 2024-05-03 | End: 2024-05-03 | Stop reason: HOSPADM

## 2024-05-03 RX ORDER — POLYETHYLENE GLYCOL 3350 17 G/17G
17 POWDER, FOR SOLUTION ORAL DAILY
Status: DISCONTINUED | OUTPATIENT
Start: 2024-05-04 | End: 2024-05-08 | Stop reason: HOSPADM

## 2024-05-03 RX ORDER — SODIUM CHLORIDE, SODIUM LACTATE, POTASSIUM CHLORIDE, CALCIUM CHLORIDE 600; 310; 30; 20 MG/100ML; MG/100ML; MG/100ML; MG/100ML
INJECTION, SOLUTION INTRAVENOUS CONTINUOUS
Status: DISCONTINUED | OUTPATIENT
Start: 2024-05-03 | End: 2024-05-03 | Stop reason: HOSPADM

## 2024-05-03 RX ORDER — METHOCARBAMOL 500 MG/1
500 TABLET, FILM COATED ORAL EVERY 6 HOURS PRN
Status: DISCONTINUED | OUTPATIENT
Start: 2024-05-03 | End: 2024-05-08 | Stop reason: HOSPADM

## 2024-05-03 RX ADMIN — FENTANYL CITRATE 100 MCG: 50 INJECTION INTRAMUSCULAR; INTRAVENOUS at 10:26

## 2024-05-03 RX ADMIN — ISOSORBIDE DINITRATE 10 MG: 10 TABLET ORAL at 20:57

## 2024-05-03 RX ADMIN — FERROUS GLUCONATE 324 MG: 324 TABLET ORAL at 09:05

## 2024-05-03 RX ADMIN — SERTRALINE HYDROCHLORIDE 100 MG: 100 TABLET ORAL at 09:05

## 2024-05-03 RX ADMIN — LIDOCAINE HYDROCHLORIDE 50 MG: 20 INJECTION, SOLUTION INFILTRATION; PERINEURAL at 10:26

## 2024-05-03 RX ADMIN — SODIUM CHLORIDE, POTASSIUM CHLORIDE, SODIUM LACTATE AND CALCIUM CHLORIDE: 600; 310; 30; 20 INJECTION, SOLUTION INTRAVENOUS at 12:10

## 2024-05-03 RX ADMIN — CARVEDILOL 12.5 MG: 12.5 TABLET, FILM COATED ORAL at 18:05

## 2024-05-03 RX ADMIN — SODIUM CHLORIDE, POTASSIUM CHLORIDE, SODIUM LACTATE AND CALCIUM CHLORIDE: 600; 310; 30; 20 INJECTION, SOLUTION INTRAVENOUS at 09:12

## 2024-05-03 RX ADMIN — SENNOSIDES AND DOCUSATE SODIUM 1 TABLET: 50; 8.6 TABLET ORAL at 09:05

## 2024-05-03 RX ADMIN — Medication 2 G: at 10:25

## 2024-05-03 RX ADMIN — NOREPINEPHRINE BITARTRATE 6.4 MCG: 1 INJECTION, SOLUTION, CONCENTRATE INTRAVENOUS at 10:26

## 2024-05-03 RX ADMIN — PROPOFOL 50 MG: 10 INJECTION, EMULSION INTRAVENOUS at 12:10

## 2024-05-03 RX ADMIN — CEFAZOLIN 1 G: 1 INJECTION, POWDER, FOR SOLUTION INTRAMUSCULAR; INTRAVENOUS at 18:53

## 2024-05-03 RX ADMIN — NOREPINEPHRINE BITARTRATE 6.4 MCG: 1 INJECTION, SOLUTION, CONCENTRATE INTRAVENOUS at 10:35

## 2024-05-03 RX ADMIN — BUPIVACAINE HYDROCHLORIDE AND EPINEPHRINE BITARTRATE 30 ML: 2.5; .005 INJECTION, SOLUTION INFILTRATION; PERINEURAL at 09:58

## 2024-05-03 RX ADMIN — NOREPINEPHRINE BITARTRATE 6.4 MCG: 1 INJECTION, SOLUTION, CONCENTRATE INTRAVENOUS at 11:02

## 2024-05-03 RX ADMIN — DEXAMETHASONE SODIUM PHOSPHATE 8 MG: 4 INJECTION, SOLUTION INTRA-ARTICULAR; INTRALESIONAL; INTRAMUSCULAR; INTRAVENOUS; SOFT TISSUE at 10:26

## 2024-05-03 RX ADMIN — ONDANSETRON 4 MG: 2 INJECTION INTRAMUSCULAR; INTRAVENOUS at 12:16

## 2024-05-03 RX ADMIN — NOREPINEPHRINE BITARTRATE 6.4 MCG: 1 INJECTION, SOLUTION, CONCENTRATE INTRAVENOUS at 10:40

## 2024-05-03 RX ADMIN — ISOSORBIDE DINITRATE 10 MG: 10 TABLET ORAL at 09:06

## 2024-05-03 RX ADMIN — PROPOFOL 60 MG: 10 INJECTION, EMULSION INTRAVENOUS at 10:26

## 2024-05-03 RX ADMIN — AMLODIPINE BESYLATE 10 MG: 10 TABLET ORAL at 09:06

## 2024-05-03 RX ADMIN — ACETAMINOPHEN 975 MG: 325 TABLET, FILM COATED ORAL at 18:05

## 2024-05-03 RX ADMIN — HYDROMORPHONE HYDROCHLORIDE 0.5 MG: 1 INJECTION, SOLUTION INTRAMUSCULAR; INTRAVENOUS; SUBCUTANEOUS at 09:05

## 2024-05-03 RX ADMIN — TRANEXAMIC ACID 1 G: 1 INJECTION, SOLUTION INTRAVENOUS at 12:15

## 2024-05-03 RX ADMIN — TRANEXAMIC ACID 1 G: 1 INJECTION, SOLUTION INTRAVENOUS at 10:34

## 2024-05-03 RX ADMIN — GLYCOPYRROLATE 0.2 MG: 0.2 INJECTION, SOLUTION INTRAMUSCULAR; INTRAVENOUS at 10:46

## 2024-05-03 RX ADMIN — PANTOPRAZOLE SODIUM 40 MG: 40 TABLET, DELAYED RELEASE ORAL at 09:05

## 2024-05-03 RX ADMIN — PHENYLEPHRINE HYDROCHLORIDE 0.5 MCG/KG/MIN: 10 INJECTION INTRAVENOUS at 10:35

## 2024-05-03 RX ADMIN — NOREPINEPHRINE BITARTRATE 6.4 MCG: 1 INJECTION, SOLUTION, CONCENTRATE INTRAVENOUS at 11:50

## 2024-05-03 RX ADMIN — NOREPINEPHRINE BITARTRATE 6.4 MCG: 1 INJECTION, SOLUTION, CONCENTRATE INTRAVENOUS at 12:01

## 2024-05-03 RX ADMIN — SENNOSIDES AND DOCUSATE SODIUM 1 TABLET: 50; 8.6 TABLET ORAL at 20:57

## 2024-05-03 RX ADMIN — CEFTRIAXONE 2 G: 2 INJECTION, POWDER, FOR SOLUTION INTRAMUSCULAR; INTRAVENOUS at 18:04

## 2024-05-03 RX ADMIN — HYDROMORPHONE HYDROCHLORIDE 0.5 MG: 1 INJECTION, SOLUTION INTRAMUSCULAR; INTRAVENOUS; SUBCUTANEOUS at 05:23

## 2024-05-03 RX ADMIN — ACETAMINOPHEN 975 MG: 325 TABLET, FILM COATED ORAL at 09:05

## 2024-05-03 RX ADMIN — CARVEDILOL 12.5 MG: 12.5 TABLET, FILM COATED ORAL at 09:05

## 2024-05-03 RX ADMIN — SODIUM CHLORIDE, POTASSIUM CHLORIDE, SODIUM LACTATE AND CALCIUM CHLORIDE: 600; 310; 30; 20 INJECTION, SOLUTION INTRAVENOUS at 09:53

## 2024-05-03 RX ADMIN — NOREPINEPHRINE BITARTRATE 6.4 MCG: 1 INJECTION, SOLUTION, CONCENTRATE INTRAVENOUS at 12:18

## 2024-05-03 ASSESSMENT — ACTIVITIES OF DAILY LIVING (ADL)
ADLS_ACUITY_SCORE: 40
ADLS_ACUITY_SCORE: 43
ADLS_ACUITY_SCORE: 46
ADLS_ACUITY_SCORE: 44
ADLS_ACUITY_SCORE: 43
ADLS_ACUITY_SCORE: 44
ADLS_ACUITY_SCORE: 46
ADLS_ACUITY_SCORE: 44
ADLS_ACUITY_SCORE: 43
ADLS_ACUITY_SCORE: 40
ADLS_ACUITY_SCORE: 44
ADLS_ACUITY_SCORE: 46
ADLS_ACUITY_SCORE: 40
ADLS_ACUITY_SCORE: 44
ADLS_ACUITY_SCORE: 40
ADLS_ACUITY_SCORE: 40

## 2024-05-03 NOTE — ANESTHESIA CARE TRANSFER NOTE
Patient: Daya Ignacio    Procedure: Procedure(s):  Open reduction internal fixation, left distal tibia and fibula fracture  Open reduction internal fixation suprapatellar intramedullary rodding, left tibia       Diagnosis: Fracture of distal end of tibia with fibula, left, closed, initial encounter [S82.302A, S82.832A]  Fall, initial encounter [W19.XXXA]  Diagnosis Additional Information: No value filed.    Anesthesia Type:   General     Note:    Oropharynx: spontaneously breathing  Level of Consciousness: drowsy  Oxygen Supplementation: face mask    Independent Airway: airway patency satisfactory and stable  Dentition: dentition unchanged  Vital Signs Stable: post-procedure vital signs reviewed and stable  Report to RN Given: handoff report given  Patient transferred to: PACU    Handoff Report: Identifed the Patient, Identified the Reponsible Provider, Reviewed the pertinent medical history, Discussed the surgical course, Reviewed Intra-OP anesthesia mangement and issues during anesthesia, Set expectations for post-procedure period and Allowed opportunity for questions and acknowledgement of understanding      Vitals:  Vitals Value Taken Time   BP     Temp     Pulse 77 05/03/24 1251   Resp 8 05/03/24 1251   SpO2 100 % 05/03/24 1251   Vitals shown include unfiled device data.    Electronically Signed By: KARTHIK Ann CRNA  May 3, 2024  12:52 PM

## 2024-05-03 NOTE — ANESTHESIA PROCEDURE NOTES
Airway       Patient location during procedure: OR  Staff -        Performed By: CRNA  Consent for Airway        Urgency: elective  Indications and Patient Condition       Indications for airway management: kayce-procedural and airway protection       Induction type:intravenous       Mask difficulty assessment: 1 - vent by mask    Final Airway Details       Final airway type: supraglottic airway    Supraglottic Airway Details        Type: LMA       Brand: I-Gel       LMA size: 4    Post intubation assessment        Placement verified by: capnometry and equal breath sounds        Number of attempts at approach: 1       Number of other approaches attempted: 0       Secured with: commercial tube bruce       Ease of procedure: easy       Dentition: Intact

## 2024-05-03 NOTE — ANESTHESIA PREPROCEDURE EVALUATION
Anesthesia Pre-Procedure Evaluation    Patient: Daya Ignacio   MRN: [id number] : 10/11/1930        Procedure : Procedure(s):  Open reduction internal fixation, left distal tibia and fibula fracture  Open reduction internal fixation suprapatellar intramedullary rodding, left tibia          Past Medical History:   Diagnosis Date    Anemia     Anxiety     Arthritis     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 2014    Closed fracture of multiple pubic rami, left, initial encounter (H) 10/6/2021    Closed fracture of proximal end of right tibia and fibula 2019    Depression     h/o Clostridium difficile colitis 2019    Hip fracture (H) 2011    History of blood transfusion     Hypertension     Kidney stone     Lumbar compression fracture (H)     Macular degeneration (senile) of retina, unspecified     Mild cognitive impairment     Mumps     NSTEMI (non-ST elevated myocardial infarction) (H) 2017    Other chronic pain     low back    Recurrent UTI     Sepsis secondary to UTI (H) 2019    Upper GI bleed 2019    Urinary tract infection due to extended-spectrum beta lactamase (ESBL)-producing Klebsiella 2019    resistant to Bactrim      Past Surgical History:   Procedure Laterality Date    Cataract removal NOS Bilateral     COMBINED CYSTOSCOPY, INSERT STENT URETER(S) Left 2020    Procedure: Cystoscopy with left ureteral stent insertion;  Surgeon: Gaurav Munson MD;  Location:  OR    CYSTOSCOPY N/A 2019    Procedure: Exam under anesthesia, video cystopanendoscopy;  Surgeon: Dennis Carlson MD;  Location:  OR    ESOPHAGOSCOPY, GASTROSCOPY, DUODENOSCOPY (EGD), COMBINED N/A 2019    Procedure: ESOPHAGOGASTRODUODENOSCOPY (EGD);  Surgeon: Gaurav Degroot MD;  Location:  GI    EXTRACORPOREAL SHOCK WAVE LITHOTRIPSY (ESWL) Left 2020    Procedure: LEFT EXTRACORPOREAL SHOCK WAVE LITHOTRIPSY;  Surgeon: Gaurav Munson MD;  Location:  OR    Hip  Pinning procedure Left     Hip replacement NOS Right     LASER HOLMIUM LITHOTRIPSY URETER(S), INSERT STENT, COMBINED Left 10/30/2020    Procedure: Cystoscopy, left ureteral stent exchange, left retrograde pyelogram, interpretation of fluoroscopic images, left ureteroscopy with holmium lithotripsy and stone basketing;  Surgeon: Gaurav Munson MD;  Location: [facility] OR    OPEN REDUCTION INTERNAL FIXATION TIBIAL PLATEAU Right 8/28/2019    Procedure: Open reduction internal fixation right proximal tibia fracture;  Surgeon: Molina Pardo MD;  Location: [facility] OR    TONSILLECTOMY, ADENOIDECTOMY, COMBINED        Allergies   Allergen Reactions    Atorvastatin Muscle Pain (Myalgia)    Macrobid [Nitrofurantoin Anhydrous] Other (See Comments)     Body aches    Nitrofurantoin Muscle Pain (Myalgia) and Unknown     Body aches      Penicillins Unknown    Seasonal Allergies Unknown    Sulfa Antibiotics Other (See Comments)     Tolerated Bactrim May 2019  Entire family has allergy to Sulfa    Sulfasalazine Unknown      Social History     Tobacco Use    Smoking status: Former     Types: Cigarettes    Smokeless tobacco: Never   Substance Use Topics    Alcohol use: No      Wt Readings from Last 1 Encounters:   05/02/24 46.8 kg (103 lb 2.8 oz)        Anesthesia Evaluation            ROS/MED HX  ENT/Pulmonary:  - neg pulmonary ROS     Neurologic:     (+) delerium,                               Cardiovascular:     (+)  hypertension- -   -  - -                                      METS/Exercise Tolerance:     Hematologic:     (+)      anemia (Hb 8),          Musculoskeletal:   (+)     fracture,          GI/Hepatic: Comment: History of GI bleed        Renal/Genitourinary:       Endo:     (+)  type II DM,                    Psychiatric/Substance Use:     (+) psychiatric history anxiety       Infectious Disease:       Malignancy:       Other:      (+)  , H/O Chronic Pain,         Physical Exam    Airway        Mallampati: II   TM  distance: > 3 FB   Neck ROM: full   Mouth opening: > 3 cm    Respiratory Devices and Support         Dental           Cardiovascular   cardiovascular exam normal          Pulmonary   pulmonary exam normal                OUTSIDE LABS:  CBC:   Lab Results   Component Value Date    WBC 8.6 05/02/2024    WBC 9.5 05/01/2024    HGB 8.0 (L) 05/03/2024    HGB 9.1 (L) 05/02/2024    HCT 26.5 (L) 05/02/2024    HCT 24.3 (L) 05/01/2024     05/02/2024     05/01/2024     BMP:   Lab Results   Component Value Date     05/02/2024     05/01/2024    POTASSIUM 3.6 05/02/2024    POTASSIUM 3.5 05/01/2024    CHLORIDE 102 05/02/2024    CHLORIDE 101 05/01/2024    CO2 24 05/02/2024    CO2 25 05/01/2024    BUN 16.4 05/02/2024    BUN 17.0 05/01/2024    CR 0.48 (L) 05/02/2024    CR 0.75 05/01/2024    GLC 97 05/02/2024     (H) 05/01/2024     COAGS:   Lab Results   Component Value Date    PTT 29 06/23/2011    INR 1.12 05/02/2024    FIBR 177 (L) 06/23/2011     POC:   Lab Results   Component Value Date     (H) 11/04/2020     HEPATIC:   Lab Results   Component Value Date    ALBUMIN 4.0 01/24/2023    PROTTOTAL 6.9 01/24/2023    ALT 8 (L) 01/24/2023    AST 14 01/24/2023    ALKPHOS 77 01/24/2023    BILITOTAL 1.1 01/24/2023     OTHER:   Lab Results   Component Value Date    PH 7.23 (L) 01/23/2023    LACT 2.2 (H) 02/06/2023    A1C 5.3 01/13/2017    DAVE 9.1 05/02/2024    PHOS 5.5 (H) 07/15/2019    MAG 1.7 08/29/2019    LIPASE 42 (L) 05/06/2020    AMYLASE 361 (H) 06/25/2011    TSH 0.79 12/10/2019    CRP 21.5 (H) 05/11/2020    SED 10 07/09/2007       Anesthesia Plan    ASA Status:  2    NPO Status:  NPO Appropriate    Anesthesia Type: General.     - Airway: LMA   Induction: Intravenous.   Maintenance: Balanced.        Consents    Anesthesia Plan(s) and associated risks, benefits, and realistic alternatives discussed. Questions answered and patient/representative(s) expressed understanding.     - Discussed: Risks,  Benefits and Alternatives for BOTH SEDATION and the PROCEDURE were discussed     - Discussed with:  Patient, Healthcare Power of  (son Gael)       - Patient is DNR/DNI Status: Yes             Suspend during perioperative period? No (no CPR or ACLS).    Use of blood products discussed: Yes.     - Discussed with: Healthcare Power of .     - Consented: consented to blood products     Postoperative Care    Pain management: IV analgesics, Oral pain medications, Multi-modal analgesia, Peripheral nerve block (Single Shot).   PONV prophylaxis: Ondansetron (or other 5HT-3), Dexamethasone or Solumedrol     Comments:    Other Comments: Elevated risk of postoperative delirium and confusion -- plan for opioid-sparing multimodal analgesia including peripheral nerve block. Avoid long acting sedatives.            Shahnaz Emerson MD    I have reviewed the pertinent notes and labs in the chart from the past 30 days and (re)examined the patient.  Any updates or changes from those notes are reflected in this note.

## 2024-05-03 NOTE — ANESTHESIA POSTPROCEDURE EVALUATION
Patient: Daya Ignacio    Procedure: Procedure(s):  Open reduction internal fixation, left distal tibia and fibula fracture  Open reduction internal fixation suprapatellar intramedullary rodding, left tibia       Anesthesia Type:  General    Note:  Disposition: Inpatient   Postop Pain Control: Uneventful            Sign Out: Well controlled pain   PONV: No   Neuro/Psych: Uneventful            Sign Out: Acceptable/Baseline neuro status   Airway/Respiratory: Uneventful            Sign Out: Acceptable/Baseline resp. status   CV/Hemodynamics: Uneventful            Sign Out: Acceptable CV status; No obvious hypovolemia; No obvious fluid overload   Other NRE:    DID A NON-ROUTINE EVENT OCCUR? No           Last vitals:  Vitals Value Taken Time   /61 05/03/24 1335   Temp 97.1  F (36.2  C) 05/03/24 1330   Pulse 70 05/03/24 1339   Resp 10 05/03/24 1339   SpO2 96 % 05/03/24 1339   Vitals shown include unfiled device data.    Electronically Signed By: Shahnaz Emerson MD  May 3, 2024  1:41 PM

## 2024-05-03 NOTE — PLAN OF CARE
"To Do:  End of Shift Summary  For vital signs and complete assessments, please see documentation flowsheets.     Pertinent assessments: Pt admitted to unit this shift. Alert, oriented to self and situation. VSS on 1L NC. Afebrile. Bedrest, left short leg splint in place. Rates pain at 10, IV dilaudid and scheduled tylenol given. Regular diet, minimal intake, NPO at midnight for surgery tomorrow. Incontinent of bowel and bladder, purewick in place. PIV infusing LR at 75ml/hr. CHG bath done this evening for surgery tomorrow. Slept on and off for most of shift. Bed alarm on. Contact precautions.      Major Shift Events: Pt admitted to unit.   Treatment Plan: ORIF tomorrow, pain management, IVF.     Bedside Nurse: Darleen Hickey RN   Problem: Adult Inpatient Plan of Care  Goal: Plan of Care Review  Description: The Plan of Care Review/Shift note should be completed every shift.  The Outcome Evaluation is a brief statement about your assessment that the patient is improving, declining, or no change.  This information will be displayed automatically on your shift  note.  Outcome: Progressing  Flowsheets (Taken 5/2/2024 7342)  Outcome Evaluation: Dilaudid and tylenol given for pain. NPO at midnight for surgery tomorrow.  Plan of Care Reviewed With: patient  Overall Patient Progress: no change  Goal: Patient-Specific Goal (Individualized)  Description: You can add care plan individualizations to a care plan. Examples of Individualization might be:  \"Parent requests to be called daily at 9am for status\", \"I have a hard time hearing out of my right ear\", or \"Do not touch me to wake me up as it startles  me\".  Outcome: Progressing  Goal: Absence of Hospital-Acquired Illness or Injury  Outcome: Progressing  Intervention: Identify and Manage Fall Risk  Recent Flowsheet Documentation  Taken 5/2/2024 0004 by Darleen Hickey, RN  Safety Promotion/Fall Prevention:   activity supervised   clutter free environment maintained   " increased rounding and observation   increase visualization of patient   lighting adjusted   nonskid shoes/slippers when out of bed   patient and family education   room door open   room near nurse's station   supervised activity  Intervention: Prevent Infection  Recent Flowsheet Documentation  Taken 5/2/2024 1747 by Darleen Hickey RN  Infection Prevention: hand hygiene promoted  Goal: Optimal Comfort and Wellbeing  Outcome: Progressing  Intervention: Monitor Pain and Promote Comfort  Recent Flowsheet Documentation  Taken 5/2/2024 1747 by Darleen Hickey RN  Pain Management Interventions:   medication (see MAR)   cold applied   distraction  Goal: Readiness for Transition of Care  Outcome: Progressing  Intervention: Mutually Develop Transition Plan  Recent Flowsheet Documentation  Taken 5/2/2024 1624 by Darleen Hickey RN  Equipment Currently Used at Home: walker, rolling     Problem: Pain Acute  Goal: Optimal Pain Control and Function  Outcome: Progressing  Intervention: Develop Pain Management Plan  Recent Flowsheet Documentation  Taken 5/2/2024 1747 by Darleen Hickey RN  Pain Management Interventions:   medication (see MAR)   cold applied   distraction  Intervention: Prevent or Manage Pain  Recent Flowsheet Documentation  Taken 5/2/2024 1747 by Darleen Hickey RN  Medication Review/Management: medications reviewed     Problem: Comorbidity Management  Goal: Blood Pressure in Desired Range  Outcome: Progressing  Intervention: Maintain Blood Pressure Management  Recent Flowsheet Documentation  Taken 5/2/2024 1747 by Darleen Hickey RN  Medication Review/Management: medications reviewed     Problem: Orthopaedic Fracture  Goal: Absence of Bleeding  Outcome: Progressing  Goal: Bowel Elimination  Outcome: Progressing  Goal: Absence of Embolism Signs and Symptoms  Outcome: Progressing  Goal: Fracture Stability  Outcome: Progressing  Goal: Optimal Functional Ability  Outcome: Progressing  Goal: Absence of Infection  Signs and Symptoms  Outcome: Progressing  Goal: Effective Tissue Perfusion  Outcome: Progressing  Goal: Optimal Pain Control and Function  Outcome: Progressing  Intervention: Manage Acute Orthopaedic-Related Pain  Recent Flowsheet Documentation  Taken 5/2/2024 1747 by Darleen Hickey RN  Pain Management Interventions:   medication (see MAR)   cold applied   distraction  Goal: Effective Oxygenation and Ventilation  Outcome: Progressing  Intervention: Promote Airway Secretion Clearance  Recent Flowsheet Documentation  Taken 5/2/2024 1747 by Darleen Hickey RN  Cough And Deep Breathing: done independently per patient     Problem: Skin Injury Risk Increased  Goal: Skin Health and Integrity  Outcome: Progressing  Intervention: Plan: Nurse Driven Intervention: Moisture Management  Recent Flowsheet Documentation  Taken 5/2/2024 1747 by Darleen Hickey RN  Moisture Interventions:   Encourage regular toileting   No brief in bed   Incontinence pad  Intervention: Plan: Nurse Driven Intervention: Friction and Shear  Recent Flowsheet Documentation  Taken 5/2/2024 1747 by Darleen Hickey RN  Friction/Shear Interventions: HOB 30 degrees or less   Goal Outcome Evaluation:      Plan of Care Reviewed With: patient    Overall Patient Progress: no changeOverall Patient Progress: no change    Outcome Evaluation: Dilaudid and tylenol given for pain. NPO at midnight for surgery tomorrow.

## 2024-05-03 NOTE — PROGRESS NOTES
[facility]  Hospitalist Progress Note  Margarito Reina MD 05/03/2024    Reason for Stay (Diagnosis):Left distal tibia and distal fibula shaft fractures         Assessment and Plan:      Summary of Stay: Daya Ignacio is a 89 year old female with PMH significant for HTN, MDD, anxiety, mild cognitive impairment, anemia, chronic back pain from compression fractures, UTI, GI bleed, nephrolithiasis, C. difficile, and falls with previous fractures including right tib/fib, pubic rami, and hip who [facility]  [facility] an [facility]itnessed [facility] and found to have comminuted left distal tibia and distal fibula shaft fractures      Unwitnessed fall  Acute comminuted left distal tibia and distal fibula/malleolar fractures  Chronic back pain  History spinal compression fractures.   -Status post ORIF today.  -She is immediate postop, somewhat confused and not cooperative with care.  -Continue gentle hydration for now until she starts oral intake   -Pain control as ordered.  -PT/OT per Ortho.  -Fall precaution .  -Risk of postoperative delirium, needs close monitoring.  -Resume PTA medications     Chronic medical condition:  HTN: PTA Coreg 12.5 mg twice daily, Lisinopril 30 mg daily, Isordil 10 mg 3 times daily, clonidine 0.2 mg at bedtime and amlodipine 10 mg daily.  -Resume Coreg, amlodipine, Imdur today.  -May consider restarting lisinopril and clonidine tomorrow if blood pressures tolerate.    Mild cognitive impairment:   -Has some memory issues, at risk of postop delirium.  -Close monitoring .  -Fall precautions     History of UTIs and nephrolithiasis:   -For unclear reason UA ordered on admission has not been done yet.    Chronic anemia: Hemoglobin at baseline of 8.  -Check hemoglobin in the morning post op.    GERD  History of GI bleed:   -Continue PPI as ordered.     MDD  Anxiety:   -Continue sertraline 100 mg daily.     Clinically Significant Risk Factors                  # Hypertension: Noted on  "problem list        # Cachexia: Estimated body mass index is 17.71 kg/m  as calculated from the following:    Height as of this encounter: 1.626 m (5' 4\").    Weight as of this encounter: 46.8 kg (103 lb 2.8 oz)., PRESENT ON ADMISSION     # Financial/Environmental Concerns: none           DVT Prophylaxis: Pneumatic Compression Devices, per orthopedic service  Code Status: DNR / DNI  Discharge Dispo: Likely TCU  Estimated Disch Date / # of Days until Disch: 2 more days in the hospital  I discussed with patient and nursing staff.        Interval History (Subjective):      Patient seen and examined, back from the OR, pain is controlled, Not fully cooperative with care exam as she stated she is tired and wanted to sleep.                  Physical Exam:      Last Vital Signs:  /45 (BP Location: Right arm)   Pulse 69   Temp 97.2  F (36.2  C) (Temporal)   Resp 14   Ht 1.626 m (5' 4\")   Wt 46.8 kg (103 lb 2.8 oz)   SpO2 96%   Breastfeeding No   BMI 17.71 kg/m      I/O last 3 completed shifts:  In: 1301 [I.V.:1301]  Out: 600 [Urine:600]  Vitals:    05/01/24 2148 05/02/24 2330   Weight: 49.9 kg (110 lb) 46.8 kg (103 lb 2.8 oz)     Current Facility-Administered Medications   Medication Dose Route Frequency Provider Last Rate Last Admin    [START ON 5/6/2024] acetaminophen (TYLENOL) tablet 650 mg  650 mg Oral Q4H PRN Rama De Luna PA-C        acetaminophen (TYLENOL) tablet 975 mg  975 mg Oral Q8H Rama De Luna PA-C        amLODIPine (NORVASC) tablet 10 mg  10 mg Oral Daily Jermaine Pereira MD   10 mg at 05/03/24 0906    benzocaine-menthol (CHLORASEPTIC) 6-10 MG lozenge 1 lozenge  1 lozenge Buccal Q1H PRN Rama De Luna PA-C        [START ON 5/6/2024] bisacodyl (DULCOLAX) suppository 10 mg  10 mg Rectal Daily PRN Rama De Luna PA-C        calcium carbonate (TUMS) chewable tablet 1,000 mg  1,000 mg Oral 4x Daily PRN Jermaine Pereira MD        carvedilol (COREG) tablet 12.5 mg  12.5 mg " Oral BID w/meals Jermaine Pereira MD   12.5 mg at 05/03/24 0905    ceFAZolin (ANCEF) 1 g vial to attach to  ml bag for ADULT or 50 ml bag for PEDS  1 g Intravenous Q8H Rama De Luna PA-C        [Held by provider] cloNIDine (CATAPRES) tablet 0.2 mg  0.2 mg Oral At Bedtime Jermaine Pereira MD        [START ON 5/4/2024] enoxaparin ANTICOAGULANT (LOVENOX) injection 30 mg  30 mg Subcutaneous Q24H Rama De Luna PA-C        ferrous gluconate (FERGON) tablet 324 mg  324 mg Oral Daily Jermaine Pereira MD   324 mg at 05/03/24 0905    HYDROmorphone (DILAUDID) injection 0.2 mg  0.2 mg Intravenous Q2H PRN Rama De Luna PA-C        Or    HYDROmorphone (DILAUDID) injection 0.4 mg  0.4 mg Intravenous Q2H PRN Rama De Luna PA-C        hydrOXYzine HCl (ATARAX) tablet 10 mg  10 mg Oral Q6H PRN Rama De Luna PA-C        isosorbide dinitrate (ISORDIL) tablet 10 mg  10 mg Oral TID Jermaine Pereira MD   10 mg at 05/03/24 0906    lactated ringers infusion   Intravenous Continuous Rama De Luna PA-C 75 mL/hr at 05/03/24 1432 Restarted at 05/03/24 1432    lidocaine (LMX4) cream   Topical Q1H PRN Jermaine Pereira MD        lidocaine 1 % 0.1-1 mL  0.1-1 mL Other Q1H PRN Jermaine Pereira MD        [Held by provider] lisinopril (ZESTRIL) tablet 30 mg  30 mg Oral Daily Jermaine Pereira MD        [START ON 5/5/2024] magnesium hydroxide (MILK OF MAGNESIA) suspension 30 mL  30 mL Oral Daily PRN Rama De Luna PA-C        melatonin tablet 1 mg  1 mg Oral At Bedtime PRN Jermaine Pereira MD        methocarbamol (ROBAXIN) tablet 500 mg  500 mg Oral Q6H PRN Rama De Luna PA-C        naloxone (NARCAN) injection 0.2 mg  0.2 mg Intravenous Q2 Min PRN Margariot Reina MD        Or    naloxone (NARCAN) injection 0.4 mg  0.4 mg Intravenous Q2 Min PRN Margarito Reina MD        Or    naloxone (NARCAN) injection 0.2 mg  0.2 mg Intramuscular Q2 Min PRN  Margarito Reina MD        Or    naloxone (NARCAN) injection 0.4 mg  0.4 mg Intramuscular Q2 Min PRN Margarito Reina MD        ondansetron (ZOFRAN ODT) ODT tab 4 mg  4 mg Oral Q6H PRN Rama De Luna PA-C        Or    ondansetron (ZOFRAN) injection 4 mg  4 mg Intravenous Q6H PRN Rama De Luna PA-C        oxyCODONE (ROXICODONE) tablet 5 mg  5 mg Oral Q4H PRN Rama De Luna PA-C        Or    oxyCODONE IR (ROXICODONE) tablet 10 mg  10 mg Oral Q4H PRN Rama De Luna PA-C        pantoprazole (PROTONIX) EC tablet 40 mg  40 mg Oral QAM AC Jermaine Pereira MD   40 mg at 05/03/24 0905    [START ON 5/4/2024] polyethylene glycol (MIRALAX) Packet 17 g  17 g Oral Daily Rama De Luna PA-C        polyethylene glycol (MIRALAX) Packet 17 g  17 g Oral BID PRN Jermaine Pereira MD        prochlorperazine (COMPAZINE) injection 5 mg  5 mg Intravenous Q6H PRN Rama De Luna PA-C        Or    prochlorperazine (COMPAZINE) tablet 5 mg  5 mg Oral Q6H [facility]N [facility]        senna-docusate (SENOKOT-S/PERICOLACE) 8.6-50 MG per tablet 1 tablet  1 tablet Oral BID Rama De Luna PA-C        sertraline (ZOLOFT) tablet 100 mg  100 mg Oral Daily Jermaine Pereira MD   100 mg at 05/03/24 0905    sodium chloride (PF) 0.9% PF flush 3 mL  3 mL Intracatheter Q8H Rama De Luna PA-C        sodium chloride (PF) 0.9% PF flush 3 mL  3 mL Intracatheter q1 min prn Rama De Luna PA-C        sodium chloride (PF) 0.9% PF flush 3 mL  3 mL Intracatheter Q8H Jermaine Pereira MD   3 mL at 05/03/24 0906    sodium chloride (PF) 0.9% PF flush 3 mL  3 mL Intracatheter q1 min prn Kelli, Jermaine Collins MD           Constitutional: Awake,, not in distress, no agitation     Respiratory: Clear to auscultation bilaterally, no crackles or wheezing   Cardiovascular: Regular rate and rhythm, normal S1 and S2, and no murmur noted   Abdomen: Normal bowel sounds, soft, non-distended, non-tender    Skin: No rashes, no cyanosis, dry to touch   Neuro: Awake, not cooperative in detail exam not done but moving her upper extremities and right lower.   Extremities: Left leg dressed and wrapped, good capillary refill   Other(s):HEENT Pink, nonicteric, moist oral mucosa       All other systems: Negative          Medications:      All current medications were reviewed with changes reflected in problem list.         Data:      All new lab and imaging data was reviewed.   Labs:  Recent Labs   Lab 05/02/24  0717 05/01/24  2219    135   POTASSIUM 3.6 3.5   CHLORIDE 102 101   CO2 24 25   ANIONGAP 11 9   GLC 97 156*   BUN 16.4 17.0   CR 0.48* 0.75   GFRESTIMATED 88 74   DAVE 9.1 9.3     Recent Labs   Lab 05/03/24  0716 05/02/24  0717 05/01/24  2219   WBC  --  8.6 9.5   HGB 8.0* 9.1* 8.2*   HCT  --  26.5* 24.3*   MCV  --  103* 103*   PLT  --  194 202     Recent Labs   Lab 05/02/24  0717 05/01/24  2219   GLC 97 156*      Imaging:   Results for orders placed or performed during the hospital encounter of 05/01/24   CT Head w/o Contrast    Narrative    EXAM: CT HEAD W/O CONTRAST, CT CERVICAL SPINE W/O CONTRAST  LOCATION: [facility]  DATE: 5/1/2024    INDICATION: Unwitnessed fall.  COMPARISON: CT head from 2/7/2023 and CTA head and neck 2/6/2023.  TECHNIQUE:   1) Routine CT Head without IV contrast. Multiplanar reformats. Dose reduction techniques were used.  2) Routine CT Cervical Spine without IV contrast. Multiplanar reformats. Dose reduction techniques were used.    FINDINGS:   HEAD CT:   INTRACRANIAL CONTENTS: No intracranial hemorrhage, extraaxial collection, or mass effect. No CT evidence of acute infarct. Mild to moderate volume loss and mild burden presumed chronic small vessel ischemia.    VISUALIZED ORBITS/SINUSES/MASTOIDS: No intraorbital abnormality. No paranasal sinus mucosal disease. No middle ear or mastoid effusion.    BONES/SOFT TISSUES: Question mild right periorbital soft tissue  swelling. No fracture of the visualized facial structures. Small ovoid benign-appearing fibro-osseous structure in the right aspect of the clivus adjacent to the sphenoid sinus likely   represents arrested pneumatization as an incidental finding. It is stable.    CERVICAL SPINE CT:   VERTEBRA: The patient is osteopenic. 3 mm degenerative anterolisthesis of C3 on C4 and mild degenerative anterolisthesis C7 on T1. Alignment is otherwise normal. No acute cervical spine fracture. Marked loss of disc space height C4-C5 through C6-C7,   marked left C3-C4, C4-C5 facet arthropathy. Mild age-indeterminate mild chronic superior endplate compressions of T2-T4 again noted.     CANAL/FORAMINA: Mild to moderate C4-C5 degenerative canal narrowing, marked bilateral C3-C4, C4-C5, and on the left at C5-C6.    PARASPINAL: Multinodular thyroid gland again noted. Visualized lung fields are clear.      Impression    IMPRESSION:  HEAD CT:  1.  Question mild right periorbital soft tissue swelling. No acute intracranial hemorrhage or calvarial fracture.    2.  Age-related and chronic ischemic changes are stable.    CERVICAL SPINE CT:  1.  No acute cervical spine fracture.    2.  Mild chronic T2-T4 compressions are stable.   CT Cervical Spine w/o Contrast    Narrative    EXAM: CT HEAD W/O CONTRAST, CT CERVICAL SPINE W/O CONTRAST  LOCATION: [facility]  DATE: 5/1/2024    INDICATION: Unwitnessed fall.  COMPARISON: CT head from 2/7/2023 and CTA head and neck 2/6/2023.  TECHNIQUE:   1) Routine CT Head without IV contrast. Multiplanar reformats. Dose reduction techniques were used.  2) Routine CT Cervical Spine without IV contrast. Multiplanar reformats. Dose reduction techniques were used.    FINDINGS:   HEAD CT:   INTRACRANIAL CONTENTS: No intracranial hemorrhage, extraaxial collection, or mass effect. No CT evidence of acute infarct. Mild to moderate volume loss and mild burden presumed chronic small vessel  ischemia.    VISUALIZED ORBITS/SINUSES/MASTOIDS: No intraorbital abnormality. No paranasal sinus mucosal disease. No middle ear or mastoid effusion.    BONES/SOFT TISSUES: Question mild right periorbital soft tissue swelling. No fracture of the visualized facial structures. Small ovoid benign-appearing fibro-osseous structure in the right aspect of the clivus adjacent to the sphenoid sinus likely   represents arrested pneumatization as an incidental finding. It is stable.    CERVICAL SPINE CT:   VERTEBRA: The patient is osteopenic. 3 mm degenerative anterolisthesis of C3 on C4 and mild degenerative anterolisthesis C7 on T1. Alignment is otherwise normal. No acute cervical spine fracture. Marked loss of disc space height C4-C5 through C6-C7,   marked left C3-C4, C4-C5 facet arthropathy. Mild age-indeterminate mild chronic superior endplate compressions of T2-T4 again noted.     CANAL/FORAMINA: Mild to moderate C4-C5 degenerative canal narrowing, marked bilateral C3-C4, C4-C5, and on the left at C5-C6.    PARASPINAL: Multinodular thyroid gland again noted. Visualized lung fields are clear.      Impression    IMPRESSION:  HEAD CT:  1.  Question mild right periorbital soft tissue swelling. No acute intracranial hemorrhage or calvarial fracture.    2.  Age-related and chronic ischemic changes are stable.    CERVICAL SPINE CT:  1.  No acute cervical spine fracture.    2.  Mild chronic T2-T4 compressions are stable.   XR Ankle Left 2 Views    Narrative    EXAM: XR ANKLE LEFT 2 VIEWS  LOCATION: [facility]  DATE: 5/1/2024    INDICATION: fall, deformity  COMPARISON: None.      Impression    IMPRESSION: Severely comminuted and moderately displaced fractures of the distal diaphyses of the tibia and fibula. Osteopenia. Underlying lucency about the fractures may be due to slightly asymmetric osteopenia, however pathologic fractures are not   entirely excluded. Soft tissue swelling about the distal lower  extremity and ankle. Small dorsal surface calcaneal spur.   CT Ankle Left w/o Contrast    Narrative    CT ANKLE LEFT WITHOUT CONTRAST 5/2/2024 10:50 AM     HISTORY: Left distal tib/fib fractures, CT for surgical planning  COMPARISON: Radiographs from 5/1/2024    TECHNIQUE: Volumetric helical acquisition of axial CT image data were  acquired through the left ankle without contrast. The axial image data  were used to reconstructed sagittal and coronal images. Radiation dose  for this scan was reduced using automated exposure control, adjustment  of the mA and/or kV according to patient size, or iterative  reconstruction technique.    FINDINGS:  Acute comminuted transversely oriented fractures of the distal tibial  and fibular diaphyses. There is 4 mm medial displacement of the  fibular fracture. There is mild posterior fracture apex angulation of  the tibial and fibular fractures. No intra-articular extension.    There is normal joint alignment. Slight widening of the tibiotalar  joint medially may reflect cartilage thinning although cannot exclude  a ligamentous injury. Mild degenerative changes throughout the  hindfoot. Small accessory navicular. Osteopenia. Tiny Achilles  calcaneal enthesophyte. No joint effusion.    Mild soft tissue swelling around the ankle. No discrete fluid  collection. The ankle tendons appear grossly intact. No evidence of  tendon entrapment. Intact anterior talofibular ligament. Diffuse fatty  muscle atrophy. Mild vascular calcification.      Impression    IMPRESSION:  1.  Acute comminuted fractures of the distal tibial and fibular  diaphyses with mild displacement.  2.  Slight widening of the tibiotalar joint medially may reflect  cartilage thinning although cannot exclude a ligamentous injury.  3.  Other ancillary findings as described above.    ELMER BURNETTE MD         SYSTEM ID:  [id number]   XR Tibia and Fibula Left 2 Views    Narrative    XR TIBIA AND FIBULA LEFT 2 VIEWS  5/2/2024  11:03 AM     HISTORY: Left distal tib fib fractures, ensure no proximal fracture  COMPARISON: None      Impression    IMPRESSION: Acute comminuted fractures of the distal tibial and  fibular diaphyses with mild displacement. No intra-articular  extension. There is normal joint alignment. Osteopenia.  Atherosclerosis.    ELMER BURNETTE MD         SYSTEM ID:  [id number]   XR Surgery BORIS L/T 5 Min Fluoro w Stills    Narrative    This exam was marked as non-reportable because it will not be read by a   radiologist or a [place] non-radiologist provider.         XR Tibia & Fibula Port Left 2 Views    Narrative    TIBIA AND FIBULA PORTABLE LEFT TWO VIEWS   5/3/2024 1:26 PM     HISTORY:  Status post ORIF.    COMPARISON: Radiographs from 5/2/2024.      Impression    IMPRESSION: Interval hardware placement across the tibia and fibula  fractures. Excellent position and alignment. No complication evident.  Diffuse bone demineralization again noted. Evaluation of detail  limited because of the superimposed cast material.        no

## 2024-05-03 NOTE — PROGRESS NOTES
Notified provider about indwelling vallecillo catheter discussed removal or continued need.    Did provider choose to remove indwelling vallecillo catheter? NO    Provider's vallecillo indication for keeping indwelling vallecillo catheter: Indication for continued use: Anesthesia    Is there an order for indwelling vallecillo catheter? YES

## 2024-05-03 NOTE — OP NOTE
ORTHOPEDIC OPERATIVE NOTE    Daya Ignacio  [id number]  10/11/1930    May 3, 2024      PREOPERATIVE DIAGNOSIS:    Left distal tibial shaft fracture  Left Mathias C lateral malleolus fracture     POSTOPERATIVE DIAGNOSIS:   Same    PROCEDURE:    Left tibia shaft open reduction and internal fixation with intramedullary nail  Left Mathias C lateral malleolus fracture open reduction and internal fixation with retrograde fibular nail    SURGEON:  Colin Evnas MD    ASSISTANT: Rama De Luna PA-C, A skilled first assistant was necessary for this procedure for assistance with patient positioning, prepping, draping, surgical visualization, wound closure, and application of the dressing.     SPECIMENS:  None    COMPLICATIONS:  None    ESTIMATED BLOOD LOSS: 100cc    IMPLANTS:   Implant Name Type Inv. Item Serial No.  Lot No. LRB No. Used Action   IMP FUBULOCK SYSTEM - [id number] Metallic Hardware/[place] IMP FUBULOCK SYSTEM  ARTHREX [id number] Left 1 Implanted   T2 Alpha tibial nail, 13 x 285mm Metallic Hardware/[place]   SANTOS [id number] Left 1 Implanted   IMN screw system, advanced locking screw, 5 x 40mm Metallic Hardware/[place]   SANTOS [id number] Left 1 Implanted   IMN screw system, advanced locking screw, 5 x 47.5mm Metallic Hardware/[place]   SANTOS [id number] Left 1 Implanted   IMN screw system, advanced locking screw, 5 x 35mm Metallic Hardware/[place]   SANTOS [id number] Left 1 Implanted   IMN screw system, advanced locking screw, 5 x 30mm Metallic Hardware/[place]   SANTOS [id number] Left 1 Implanted   IMN Screws System, Advanced Locking Screw, 5x32.5mm    SANTOS [id number] Left 1 Implanted   T2 Alpha Tibia, End Cap, 11.5mm HANNAH, +25mm    SANTOS [id number] Left 1 Implanted   IMP NAIL FIBULOCK 3.8MM 130MM LEFT FIBULA  [id number] - [id number] Metallic Hardware/[place] IMP NAIL FIBULOCK 3.8MM 130MM LEFT FIBULA  [id number]  ARTHREX [id number] Left 1 Implanted   IMP SCR ARTHREX NON-LOCKING CANC NOAH 2.7X14MM  AR-8827-14 - [id number] Metallic Hardware/[place] IMP SCR ARTHREX NON-LOCKING CANC NOAH 2.7X14MM [id number]  ARTHREX [id number] Left 2 Implanted       TOURNIQUET TIME:  60min @ 250 mmHg    INDICATIONS:    Daya Ignacio is a 89 year old with a history of cognitive impairment, previous bilateral hip fractures, spine compression fractures, UTIs, previous right proximal tibia fracture status post ORIF in 2019 who presents with a left distal tibia and fibula fracture after an unwitnessed fall at her assisted living on May 1, 2024.  She was brought to [facility] emergency department for further evaluation.  I spoke with the patient's son who reports that she stands and pivots for transfers, is able to walk a few steps with a walker but is otherwise mostly bedbound or in a wheelchair.  He reports she recovered well from her prior fractures.     On exam she is a tender over the left distal tib-fib.  Swelling noted.  Skin intact.  No acute changes in sensation over the dorsal or plantar aspect of the foot.  Good capillary refill.  EHL, FHL intact.     Radiographs and CT scan were reviewed of the left tibia, fibula.  Imaging reveals osteopenia with a Mathias C fibula fracture and tibia fracture at the metaphyseal diaphyseal junction.  The distal extent of the tibia fracture is approximately 4 cm from the plafond.     I discussed this finding with the patient's next of kin, her son Gael.  We discussed potential treatment options including nonoperative and operative intervention along with risk and benefits and recovery.  We discussed the patient's goals of care including ability to maintain her function and quality of life as well as pain control.  We discussed nonoperative treatment which would involve casting and limited weightbearing.  We discussed the benefits of surgery would likely be earlier weightbearing with the boot and a more predictable recovery.  We discussed she is at high risk of complication from  surgery due to her comorbidities and overall health but surgery would likely provide her a more stable lower extremity and ability to maintain her function.  We discussed nonoperative treatment would be lower risk at first but would delay her ability to ambulate which would likely affect her quality of life. We also discussed her poor bone quality and risks of hardware failure, fractures, and delayed WB due to poor fixation. After thorough discussion Gael was in agreement to proceed with surgery.     We discussed risks related to surgery including infection, bleeding, neural vascular injury, fracture, hardware failure, medical risks related to anesthesia, morbidity, mortality, blood clots, inability to return to prior function.  Prolonged rehab.     Consent was obtained for a left tibia ORIF with intramedullary nail and the left Mathias C lateral malleolus ORIF with retrograde intramedullary nail     Alternatives including nonoperative management were discussed, but not recommended.  The patient was in agreement with the plan to proceed.  The informed consent was signed and documented.  Met with the patient preoperatively to gary the operative extremity.    OPERATIVE COURSE:    The patient was brought into the operating room and placed on operating table.  The patient underwent general anesthesia.  The patient was positioned in a supine position.  All bony prominences were well-padded.  The operative extremity was cleansed with Hibiclens. The operative extremity was then prepped with ChloraPrep.  The surgical team scrubbed in.  A WHO timeout was conducted to verify correct patient, correct extremity, presence of the surgeon's initials on the operative extremity, and administration of IV antibiotics, in this case Ancef.      Tibia Intramedullary Nail  A suprapatellar incision was utilized just proximal to the patella.  The quadricep tendon was identified and incised in line with the fibers.      The MXP4 sleeve was  then inserted through the suprapatellar region down to the proximal tibia.  The starting guidewire was positioned in the proximal tibia.  Fluoroscopy confirmed good position on AP and lateral radiographs.  The K wire was advanced into the proximal tibia.  An opening reamer was then utilized to gain access to the tibia.       Reduction:  The tibia was reduced by closed reduction. Fluoroscopy confirmed good reduction of the tibia.     A ball-tipped guidewire was then passed from the knee across the fracture and to the ankle.  Radiographs were used to confirm and the wire was passed to the fracture and found a center center position within the ankle.  The patient was noted to have poor distal tibia bone quality.  The ball-tipped guidewire was loose at times within the distal tibia and therefore was impacted into the subchondral distal tibia.  The tibia was then sequentially reamed up to a size 14.5mm.  We measured off the guidewire using radiographs.  A Koozoo 727u62ft nail was then obtained and passed over the guidewire into the tibia.  The nail was noted to be end within the metadiaphysis of the proximal femur.  The initial guidewire measured 310 and a shorter nail was obtained to ensure a good fit distally especially considering distal placement of the guidewire.  The decision was made to continue with the 285 mm nail as the majority of her fixation was needed distally and a large end cap could be used approximately     The nail was then locked in place with threaded interlocking bolts distally using a perfect [place] technique with fluoroscopy.  3 screws were placed.  Proximally the nail was locked in place with 2 interlocking threaded screws through the jig.       Fluoroscopy was utilized to confirm good placement of all hardware and good alignment of the fracture both at the tibia.  A 25 mm endcap was placed on the proximal aspect of the nail.    Fibula Intramedullary Nail  We then turned our attention to the  patient's fibula fracture.  The fibula was outlined using fluoroscopy.  An incision was made just distal to the tip of the fibula approximately 2 cm.  A K wire was then positioned at the tip of the fibula and passed proximally up the intramedullary canal of the fibula.  Radiographs confirmed placement of the K wire.  An opening reamer was then utilized with a tissue protector.  This was followed by reaming the intramedullary canal to 3.8mm. An Arthrex fibulock  3.8mm x 180mm nail was then placed.  The proximal aspect of the nail was then expanded to achieve good cortical fit.  The Arthrex jig was then utilized for placement of distal screws.  Two 2.7 millimeter screws were placed in the distal fibula.  The Arthrex jig was then disassembled.  Fluoroscopy confirmed good placement of all hardware and good alignment of the fracture.    The knee was then copiously irrigated   The remaining wounds were then irrigated with saline.  The quadricep tendon was repaired with #1 Vicryl  This was followed by 2-0 Monocryl and a running 3-0 Monocryl and Dermabond.  The remaining wounds were then closed with interrupted nylons.  The patient was placed in a dressing consisting of Xeroform, gauze, cast padding.  The knee wound was dressed with a gauze and Tegaderm dressing.  The patient's ankle was placed in a posterior slab splint.    All instruments were accounted for at the end of the case. The patient awoke from anesthesia and was transferred to the PACU in stable condition.      POST OP PLAN:    1.  Ancef x 24 hours.   2.  Lovenox while in the hospital for DVT prophylaxis may transition to ASA 325mg on D/C  3.  PACU x-rays.   4.  NWB in a short leg splint. Plan to remove splint at 2-3 weeks and place in a boot pending xrays. Will consider WBAT in a boot at 2-3 weeks pending xrays.   5.  Physical therapy/occupational therapy.   6.  Keep dressing on for 2 weeks.  OK to shower.  No submerging wound. Keep splint dry  7.  Discharge:   Case management social work consult for placement     FOLLOWUP:     Please call as soon as possible to make an appointment to be seen in Dr. Colin Evans's clinic in 2-3 weeks.     Dr. Evans's care coordinator is Emmy Browne. Please contact her at [phone] to schedule an appointment.      Dr. Evans sees patient's at 2 clinic locations:  [facility]  [address] [place] [id number]  [facility]   [address], [place] [address]      Please call the on-call phone number [phone] during evenings, nights and weekends for any urgent needs. Prescription refills must be done during business hours by calling [phone]        Colin Evans MD  [facility]

## 2024-05-03 NOTE — ANESTHESIA PROCEDURE NOTES
"Sciatic Procedure Note    Pre-Procedure   Staff -        Anesthesiologist:  Shahnaz Emerson MD       Performed By: anesthesiologist       Location: pre-op       Procedure Start/Stop Times: 5/3/2024 9:58 AM and 5/3/2024 10:01 AM       Pre-Anesthestic Checklist: patient identified, IV checked, site marked, risks and benefits discussed, informed consent, monitors and equipment checked, pre-op evaluation, at physician/surgeon's request and post-op pain management  Timeout:       Correct Patient: Yes        Correct Procedure: Yes        Correct Site: Yes        Correct Position: Yes        Correct Laterality: Yes        Site Marked: Yes  Procedure Documentation  Procedure: Sciatic       Laterality: left       Patient Position: supine       Skin prep: Betadine (popliteal approach).       Needle Type: short bevel       Needle Gauge: 20.        Needle Length (Inches): 4        Ultrasound guided       1. Ultrasound was used to identify targeted nerve, plexus, vascular marker, or fascial plane and place a needle adjacent to it in real-time.       2. Ultrasound was used to visualize the spread of anesthetic in close proximity to the above referenced structure.       4. The visualized anatomic structures appeared normal.       5. There were no apparent abnormal pathologic findings.    Assessment/Narrative         The placement was negative for: blood aspirated and painful injection       Paresthesias: No.       Bolus given via needle..        Secured via.        Insertion/Infusion Method: Single Shot       Complications: none       Injection made incrementally with aspirations every 5 mL.    Medication(s) Administered   Bupivacaine 0.25% w/ 1:200K Epi (Injection) - Injection   30 mL - 5/3/2024 9:58:00 AM  Medication Administration Time: 5/3/2024 9:58 AM      FOR [facility] ([facility]) ONLY:   Pain Team Contact information: please page the Pain Team Via [employer]. Search \"Pain\". During daytime hours, please page the attending first. " At night please page the resident first.

## 2024-05-03 NOTE — PLAN OF CARE
"Goal Outcome Evaluation:    Pertinent assessments: Assumed care of pt from 2514-3809. Pt alert but confused and forgetful. Alert to self and situation but disoriented to time and place. On bedrest, NWB on L leg until after surgery. Pain in L leg/foot managed with PRN IV dilaudid 0.5mg x1. Left leg splinted and elevated. PIV patent, infusing LR @ 75ml/hr. Purewick in place overnight. NPO status initiated at midnight. Contact precautions maintained.    Major Shift Events: none    Treatment Plan: ORIF today, pain management, IVF    Bedside Nurse: Alberta Moore RN      Plan of Care Reviewed With: patient    Overall Patient Progress: no changeOverall Patient Progress: no change    Outcome Evaluation: PRN IV dilaudid 0.5mg x1; NPO      Problem: Adult Inpatient Plan of Care  Goal: Plan of Care Review  Description: The Plan of Care Review/Shift note should be completed every shift.  The Outcome Evaluation is a brief statement about your assessment that the patient is improving, declining, or no change.  This information will be displayed automatically on your shift  note.  Outcome: Not Progressing  Flowsheets (Taken 5/3/2024 0721)  Outcome Evaluation:   PRN IV dilaudid 0.5mg x1   NPO  Plan of Care Reviewed With: patient  Overall Patient Progress: no change  Goal: Patient-Specific Goal (Individualized)  Description: You can add care plan individualizations to a care plan. Examples of Individualization might be:  \"Parent requests to be called daily at 9am for status\", \"I have a hard time hearing out of my right ear\", or \"Do not touch me to wake me up as it startles  me\".  Outcome: Not Progressing  Goal: Absence of Hospital-Acquired Illness or Injury  Outcome: Not Progressing  Intervention: Identify and Manage Fall Risk  Recent Flowsheet Documentation  Taken 5/3/2024 0054 by Alberta Moore, RN  Safety Promotion/Fall Prevention:   assistive device/personal items within reach   clutter free environment maintained   increase " visualization of patient   safety round/check completed  Intervention: Prevent Skin Injury  Recent Flowsheet Documentation  Taken 5/3/2024 0054 by Alberta Moore RN  Body Position:   supine, head elevated   supine, legs elevated  Intervention: Prevent and Manage VTE (Venous Thromboembolism) Risk  Recent Flowsheet Documentation  Taken 5/3/2024 0528 by Alberta Moore RN  VTE Prevention/Management: SCDs (sequential compression devices) off  Intervention: Prevent Infection  Recent Flowsheet Documentation  Taken 5/3/2024 0054 by Alberta Moore RN  Infection Prevention:   hand hygiene promoted   rest/sleep promoted   single patient room provided  Goal: Optimal Comfort and Wellbeing  Outcome: Not Progressing  Intervention: Monitor Pain and Promote Comfort  Recent Flowsheet Documentation  Taken 5/3/2024 0523 by Alberta Moore RN  Pain Management Interventions: medication (see MAR)  Taken 5/3/2024 0054 by Alberta Moore RN  Pain Management Interventions: quiet environment facilitated  Goal: Readiness for Transition of Care  Outcome: Not Progressing     Problem: Pain Acute  Goal: Optimal Pain Control and Function  Outcome: Not Progressing  Intervention: Develop Pain Management Plan  Recent Flowsheet Documentation  Taken 5/3/2024 0523 by Alberta Moore RN  Pain Management Interventions: medication (see MAR)  Taken 5/3/2024 0054 by Alberta Moore RN  Pain Management Interventions: quiet environment facilitated  Intervention: Prevent or Manage Pain  Recent Flowsheet Documentation  Taken 5/3/2024 0054 by Alberta Moore RN  Sleep/Rest Enhancement: awakenings minimized  Medication Review/Management: medications reviewed     Problem: Comorbidity Management  Goal: Blood Pressure in Desired Range  Outcome: Not Progressing  Intervention: Maintain Blood Pressure Management  Recent Flowsheet Documentation  Taken 5/3/2024 0054 by Alberta Moore RN  Medication Review/Management: medications reviewed     Problem: Orthopaedic  Fracture  Goal: Absence of Bleeding  Outcome: Not Progressing  Goal: Bowel Elimination  Outcome: Not Progressing  Goal: Absence of Embolism Signs and Symptoms  Outcome: Not Progressing  Intervention: Prevent or Manage Embolism Risk  Recent Flowsheet Documentation  Taken 5/3/2024 0528 by Alberta Moore RN  VTE Prevention/Management: SCDs (sequential compression devices) off  Goal: Fracture Stability  Outcome: Not Progressing  Goal: Optimal Functional Ability  Outcome: Not Progressing  Intervention: Optimize Functional Ability  Recent Flowsheet Documentation  Taken 5/3/2024 0054 by Alberta Moore RN  Activity Management: activity adjusted per tolerance  Positioning/Transfer Devices:   pillows   wedge   in use  Goal: Absence of Infection Signs and Symptoms  Outcome: Not Progressing  Intervention: Prevent or Manage Infection  Recent Flowsheet Documentation  Taken 5/3/2024 0054 by Alberta Moore RN  Isolation Precautions: contact precautions maintained  Goal: Effective Tissue Perfusion  Outcome: Not Progressing  Goal: Optimal Pain Control and Function  Outcome: Not Progressing  Intervention: Manage Acute Orthopaedic-Related Pain  Recent Flowsheet Documentation  Taken 5/3/2024 0523 by Alberta Moore RN  Pain Management Interventions: medication (see MAR)  Taken 5/3/2024 0054 by Alberta Moore RN  Pain Management Interventions: quiet environment facilitated  Sleep/Rest Enhancement: awakenings minimized  Goal: Effective Oxygenation and Ventilation  Outcome: Not Progressing  Intervention: Promote Airway Secretion Clearance  Recent Flowsheet Documentation  Taken 5/3/2024 0054 by Alberta Moore RN  Cough And Deep Breathing: done with encouragement  Activity Management: activity adjusted per tolerance  Intervention: Optimize Oxygenation and Ventilation  Recent Flowsheet Documentation  Taken 5/3/2024 0054 by Alberta Moore RN  Head of Bed (HOB) Positioning: HOB at 20-30 degrees     Problem: Skin Injury Risk  Increased  Goal: Skin Health and Integrity  Outcome: Not Progressing  Intervention: Optimize Skin Protection  Recent Flowsheet Documentation  Taken 5/3/2024 0054 by Alberta Moore, RN  Activity Management: activity adjusted per tolerance  Head of Bed (HOB) Positioning: HOB at 20-30 degrees

## 2024-05-04 ENCOUNTER — APPOINTMENT (OUTPATIENT)
Dept: GENERAL RADIOLOGY | Facility: CLINIC | Age: 89
DRG: 493 | End: 2024-05-04
Attending: STUDENT IN AN ORGANIZED HEALTH CARE EDUCATION/TRAINING PROGRAM
Payer: COMMERCIAL

## 2024-05-04 ENCOUNTER — APPOINTMENT (OUTPATIENT)
Dept: PHYSICAL THERAPY | Facility: CLINIC | Age: 89
DRG: 493 | End: 2024-05-04
Attending: INTERNAL MEDICINE
Payer: COMMERCIAL

## 2024-05-04 LAB
BLD PROD TYP BPU: NORMAL
BLOOD COMPONENT TYPE: NORMAL
CODING SYSTEM: NORMAL
CROSSMATCH: NORMAL
GLUCOSE SERPL-MCNC: 119 MG/DL (ref 70–99)
HGB BLD-MCNC: 6.4 G/DL (ref 11.7–15.7)
HGB BLD-MCNC: 6.7 G/DL (ref 11.7–15.7)
ISSUE DATE AND TIME: NORMAL
UNIT ABO/RH: NORMAL
UNIT NUMBER: NORMAL
UNIT STATUS: NORMAL
UNIT TYPE ISBT: 600

## 2024-05-04 PROCEDURE — 36415 COLL VENOUS BLD VENIPUNCTURE: CPT | Performed by: STUDENT IN AN ORGANIZED HEALTH CARE EDUCATION/TRAINING PROGRAM

## 2024-05-04 PROCEDURE — 85018 HEMOGLOBIN: CPT

## 2024-05-04 PROCEDURE — P9016 RBC LEUKOCYTES REDUCED: HCPCS | Performed by: STUDENT IN AN ORGANIZED HEALTH CARE EDUCATION/TRAINING PROGRAM

## 2024-05-04 PROCEDURE — 250N000011 HC RX IP 250 OP 636

## 2024-05-04 PROCEDURE — 97530 THERAPEUTIC ACTIVITIES: CPT | Mod: GP

## 2024-05-04 PROCEDURE — 250N000013 HC RX MED GY IP 250 OP 250 PS 637: Performed by: INTERNAL MEDICINE

## 2024-05-04 PROCEDURE — 250N000013 HC RX MED GY IP 250 OP 250 PS 637

## 2024-05-04 PROCEDURE — 250N000013 HC RX MED GY IP 250 OP 250 PS 637: Performed by: STUDENT IN AN ORGANIZED HEALTH CARE EDUCATION/TRAINING PROGRAM

## 2024-05-04 PROCEDURE — 85018 HEMOGLOBIN: CPT | Performed by: STUDENT IN AN ORGANIZED HEALTH CARE EDUCATION/TRAINING PROGRAM

## 2024-05-04 PROCEDURE — 82947 ASSAY GLUCOSE BLOOD QUANT: CPT | Performed by: STUDENT IN AN ORGANIZED HEALTH CARE EDUCATION/TRAINING PROGRAM

## 2024-05-04 PROCEDURE — 99232 SBSQ HOSP IP/OBS MODERATE 35: CPT | Performed by: STUDENT IN AN ORGANIZED HEALTH CARE EDUCATION/TRAINING PROGRAM

## 2024-05-04 PROCEDURE — 120N000001 HC R&B MED SURG/OB

## 2024-05-04 PROCEDURE — 36415 COLL VENOUS BLD VENIPUNCTURE: CPT

## 2024-05-04 PROCEDURE — 97161 PT EVAL LOW COMPLEX 20 MIN: CPT | Mod: GP

## 2024-05-04 PROCEDURE — 71045 X-RAY EXAM CHEST 1 VIEW: CPT

## 2024-05-04 RX ORDER — OXYCODONE HYDROCHLORIDE 5 MG/1
5 TABLET ORAL EVERY 4 HOURS PRN
Status: DISCONTINUED | OUTPATIENT
Start: 2024-05-04 | End: 2024-05-08 | Stop reason: HOSPADM

## 2024-05-04 RX ORDER — GUAIFENESIN 600 MG/1
600 TABLET, EXTENDED RELEASE ORAL 2 TIMES DAILY
Status: DISCONTINUED | OUTPATIENT
Start: 2024-05-04 | End: 2024-05-08 | Stop reason: HOSPADM

## 2024-05-04 RX ADMIN — SENNOSIDES AND DOCUSATE SODIUM 1 TABLET: 50; 8.6 TABLET ORAL at 20:23

## 2024-05-04 RX ADMIN — AMLODIPINE BESYLATE 10 MG: 10 TABLET ORAL at 09:11

## 2024-05-04 RX ADMIN — HYDROXYZINE HYDROCHLORIDE 10 MG: 10 TABLET ORAL at 10:17

## 2024-05-04 RX ADMIN — CARVEDILOL 12.5 MG: 12.5 TABLET, FILM COATED ORAL at 17:44

## 2024-05-04 RX ADMIN — GUAIFENESIN 600 MG: 600 TABLET, EXTENDED RELEASE ORAL at 20:23

## 2024-05-04 RX ADMIN — GUAIFENESIN 600 MG: 600 TABLET, EXTENDED RELEASE ORAL at 10:17

## 2024-05-04 RX ADMIN — POLYETHYLENE GLYCOL 3350 17 G: 17 POWDER, FOR SOLUTION ORAL at 09:10

## 2024-05-04 RX ADMIN — CEFAZOLIN 1 G: 1 INJECTION, POWDER, FOR SOLUTION INTRAMUSCULAR; INTRAVENOUS at 02:19

## 2024-05-04 RX ADMIN — ACETAMINOPHEN 975 MG: 325 TABLET, FILM COATED ORAL at 17:44

## 2024-05-04 RX ADMIN — FERROUS GLUCONATE 324 MG: 324 TABLET ORAL at 09:11

## 2024-05-04 RX ADMIN — ISOSORBIDE DINITRATE 10 MG: 10 TABLET ORAL at 20:21

## 2024-05-04 RX ADMIN — ACETAMINOPHEN 975 MG: 325 TABLET, FILM COATED ORAL at 09:11

## 2024-05-04 RX ADMIN — ISOSORBIDE DINITRATE 10 MG: 10 TABLET ORAL at 14:18

## 2024-05-04 RX ADMIN — SERTRALINE HYDROCHLORIDE 100 MG: 100 TABLET ORAL at 09:11

## 2024-05-04 RX ADMIN — ACETAMINOPHEN 975 MG: 325 TABLET, FILM COATED ORAL at 02:19

## 2024-05-04 RX ADMIN — METHOCARBAMOL 500 MG: 500 TABLET ORAL at 05:45

## 2024-05-04 RX ADMIN — CARVEDILOL 12.5 MG: 12.5 TABLET, FILM COATED ORAL at 09:11

## 2024-05-04 RX ADMIN — ISOSORBIDE DINITRATE 10 MG: 10 TABLET ORAL at 09:11

## 2024-05-04 RX ADMIN — SENNOSIDES AND DOCUSATE SODIUM 1 TABLET: 50; 8.6 TABLET ORAL at 09:11

## 2024-05-04 RX ADMIN — HYDROXYZINE HYDROCHLORIDE 10 MG: 10 TABLET ORAL at 03:43

## 2024-05-04 RX ADMIN — METHOCARBAMOL 500 MG: 500 TABLET ORAL at 13:05

## 2024-05-04 RX ADMIN — PANTOPRAZOLE SODIUM 40 MG: 40 TABLET, DELAYED RELEASE ORAL at 06:51

## 2024-05-04 ASSESSMENT — ACTIVITIES OF DAILY LIVING (ADL)
ADLS_ACUITY_SCORE: 57
ADLS_ACUITY_SCORE: 43
ADLS_ACUITY_SCORE: 57
ADLS_ACUITY_SCORE: 43
ADLS_ACUITY_SCORE: 43
ADLS_ACUITY_SCORE: 53
ADLS_ACUITY_SCORE: 43
ADLS_ACUITY_SCORE: 43
ADLS_ACUITY_SCORE: 53
ADLS_ACUITY_SCORE: 43
ADLS_ACUITY_SCORE: 53
ADLS_ACUITY_SCORE: 57
ADLS_ACUITY_SCORE: 43

## 2024-05-04 NOTE — CONSULTS
Care Management Follow Up    Length of Stay (days): 2    Expected Discharge Date: 05/06/2024     Concerns to be Addressed: discharge planning     Patient plan of care discussed at interdisciplinary rounds: Yes    Anticipated Discharge Disposition: Transitional Care     Anticipated Discharge Services:    Anticipated Discharge DME:      Patient/family educated on Medicare website which has current facility and service quality ratings: yes  Education Provided on the Discharge Plan:    Patient/Family in Agreement with the Plan: yes    Referrals Placed by CM/SW: Post Acute Facilities, Transportation    Additional Information:  Therapy recommends TCU. SW spoke with son and discussed TCU recommendation. Son requested referral be sent to [facility] and [facility], referral sent.    PRISCILA Abdi, [facility]  Inpatient Care Coordination  [facility]  [phone]    SHIN ABDI

## 2024-05-04 NOTE — PROGRESS NOTES
[facility]    Medicine Progress Note - Hospitalist Service    Date of Admission:  5/1/2024    Assessment & Plan   Summary: Daya Ignacio is a 89 year old female with PMHx of HTN, MDD, anxiety, mild cognitive impairment, anemia, chronic back pain from compression fractures, GIB, nephrolithiasis, C diff, and falls with previous fractures of R tib/fib, pubic rami, and hip, who was admitted on 5/1/2024 with unwitnessed fall at [facility] and found to have comminuted L distal fibula shaft fractures.      Unwitnessed fall  Acute comminuted left distal tibia and distal fibula/malleolar fractures s/p ORIF 5/3/24  Chronic back pain  History spinal compression fractures  -Status post ORIF 5/3/24  -Pain control as ordered  -PT/OT per Ortho  -Fall precaution   -Risk of postoperative delirium, needs close monitoring  -Resume PTA medications     Hypoxia   Unable to wean off oxygen post operatively. Cough and upper airway congestion but normal lung exam. CXR with normal sized cardiac silhouette, linear fibrotic changes versus subsegmental atelectasis within the left mid to lower lung, visualized lungs are otherwise clear. Suspect atelectasis as etiology of hypoxia. May have URI/bronchitis leading to cough.  - Mucinex  - incentive spirometry  - Wean oxygen as able    Acute on chronic anemia   Baseline Hgb of 8. Post op Hgb 6.7 and 6.4 on repeat. EBL 100cc. Suspect dilutional.  - Transfuse 1U pRBC  - AM CBC    Chronic medical condition:  HTN: PTA Coreg 12.5 mg twice daily, Lisinopril 30 mg daily, Isordil 10 mg 3 times daily, clonidine 0.2 mg at bedtime and amlodipine 10 mg daily.  -Resume Coreg, amlodipine, Imdur today.  -May consider restarting lisinopril and clonidine tomorrow if blood pressures tolerate.     Mild cognitive impairment:   -Has some memory issues, at risk of postop delirium.  -Close monitoring .  -Fall precautions     History of UTIs and nephrolithiasis:   -UA abnormal with large LE and WBC but no  "nitrites. No symptoms. 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 Will hold off on antibiotics and monitor for symptoms.       GERD  History of GI bleed:   -Continue PPI as ordered.     MDD  Anxiety:   -Continue sertraline 100 mg daily.        Diet: Advance Diet as Tolerated: Regular Diet Adult    DVT Prophylaxis: Pneumatic Compression Devices  Almanza Catheter: PRESENT, indication: Other (Comment) (to be addressed in morning per order)  Lines: None     Cardiac Monitoring: None  Code Status: No CPR- Do NOT Intubate      Clinically Significant Risk Factors                  # Hypertension: Noted on problem list        # Cachexia: Estimated body mass index is 17.71 kg/m  as calculated from the following:    Height as of this encounter: 1.626 m (5' 4\").    Weight as of this encounter: 46.8 kg (103 lb 2.8 oz)., PRESENT ON ADMISSION     # Financial/Environmental Concerns: none         Disposition Plan     Medically Ready for Discharge: Anticipated in 2-4 Days             Jailene Dumont DO  Hospitalist Service  [facility]  Securely message with [employer] (more info)  Text page via MyStream Paging/Directory   ______________________________________________________________________    Interval History   No acute overnight events. Doing well today. Having cough and congestion.     Physical Exam   Vital Signs: Temp: 98.8  F (37.1  C) Temp src: Temporal BP: 124/44 Pulse: 72   Resp: 21 SpO2: 95 % O2 Device: Oxymask Oxygen Delivery: 3 LPM  Weight: 103 lbs 2.8 oz    Constitutional: Awake, alert, no distress, and cooperative  Cardiovascular: Regular rate and rhythm, normal S1 and S2, no S3 or S4, and no murmur noted  Respiratory: No increased work of breathing, good air exchange, clear to auscultation bilaterally, no crackles or wheezing  Gastrointestinal: Abdomen soft, non-tender, non-distended. BS normal. No masses, organomegaly     Medical Decision Making       45 MINUTES SPENT BY ME on " the date of service doing chart review, history, exam, documentation & further activities per the note.      Data     I have personally reviewed the following data over the past 24 hrs:    N/A  \   6.7 (LL)   / N/A     N/A N/A N/A /  119 (H)   N/A N/A 0.47 (L) \

## 2024-05-04 NOTE — PROGRESS NOTES
05/04/24 0855   Appointment Info   Signing Clinician's Name / Credentials (PT) Syeda Stein DPT   Rehab Comments (PT) NWB left LE   Living Environment   People in Home facility resident   Current Living Arrangements assisted living   Home Accessibility no concerns   Self-Care   Usual Activity Tolerance fair   Current Activity Tolerance poor   Equipment Currently Used at Home walker, rolling   Fall history within last six months yes   Number of times patient has fallen within last six months 1   Activity/Exercise/Self-Care Comment Per social work note, patient pivots between bed/toilet/wheelchair at baseline with assist, is independent with wheelchair use for all mobility.  Receives assistance for bathing, cooking, laundry, meal prep, med management and transportation   General Information   Onset of Illness/Injury or Date of Surgery 05/01/24   Referring Physician Rama De Luna PA-C   Patient/Family Therapy Goals Statement (PT) decrease pain   Pertinent History of Current Problem (include personal factors and/or comorbidities that impact the POC) Per medical chart: Daya Ignacio is a 89 year old female with PMH significant for HTN, MDD, anxiety, mild cognitive impairment, anemia, chronic back pain from compression fractures, UTI, GI bleed, nephrolithiasis, C. difficile, and falls with previous fractures including right tib/fib, pubic rami, and hip who presented  from her long-term after an unwitnessed fall and found to have comminuted left distal tibia and distal fibula shaft fractures seen POD#1 s/p Left tibia shaft ORIF with intramedullary nail, left Mathias C lateral malleolus ORIF with retrograde fibular nail.   Weight-Bearing Status - LLE nonweight-bearing   Cognition   Affect/Mental Status (Cognition) confused   Orientation Status (Cognition) person;oriented to   Follows Commands (Cognition) 75-90% accuracy  ([place])   Pain Assessment   Patient Currently in Pain Yes, see Vital Sign flowsheet  (reports pain  at right knee, left LE)   Posture    Posture Forward head position   Range of Motion (ROM)   Range of Motion ROM deficits secondary to surgical procedure;ROM deficits secondary to pain;ROM deficits secondary to weakness   Strength (Manual Muscle Testing)   Strength (Manual Muscle Testing) Deficits observed during functional mobility   Bed Mobility   Comment, (Bed Mobility) min A with verbal cueing   Transfers   Comment, (Transfers) With ongoing numbness at left LE, pain at right LE, no standing trial attempted   Gait/Stairs (Locomotion)   Comment, (Gait/Stairs) Patient uses wheelchair for mobility at baseline   Balance   Balance Comments Patient with history of fall with injuries, now with NWB at left LE, patient remains high fall risk   Sensory Examination   Sensory Perception Comments Patient reports decreased sensation at left toes   Clinical Impression   Criteria for Skilled Therapeutic Intervention Yes, treatment indicated   PT Diagnosis (PT) Impaired functional moiblity   Influenced by the following impairments NWB at left LE, pain, cognitive status, decreased strength, decreased activity tolerance, decreased balance   Functional limitations due to impairments difficulties with transfers, bed mobility   Clinical Presentation (PT Evaluation Complexity) stable   Clinical Presentation Rationale clinical judgement   Clinical Decision Making (Complexity) low complexity   Planned Therapy Interventions (PT) balance training;bed mobility training;patient/family education;strengthening;transfer training;progressive activity/exercise   Risk & Benefits of therapy have been explained evaluation/treatment results reviewed;care plan/treatment goals reviewed;risks/benefits reviewed;current/potential barriers reviewed;participants voiced agreement with care plan;participants included;patient   PT Total Evaluation Time   PT Eval, Low Complexity Minutes (23755) 10   Physical Therapy Goals   PT Frequency 5x/week   PT Predicted  "Duration/Target Date for Goal Attainment 05/11/24   PT Goals Bed Mobility;Transfers   PT: Bed Mobility Supervision/stand-by assist;Supine to/from sit;Within precautions   PT: Transfers Supervision/stand-by assist;Sit to/from stand;Bed to/from chair;Assistive device;Within precautions   Interventions   Interventions Quick Adds Therapeutic Activity   Therapeutic Activity   Therapeutic Activities: dynamic activities to improve functional performance Minutes (87161) 24   Symptoms Noted During/After Treatment Increased pain   Treatment Detail/Skilled Intervention Patient supine upon arrival, had removed oxygen and pulse ox.  Found to be at 82% on RA, nursing present, donned 4L NC to recover to above 90% within 2 min.  Patient reporting pain at right knee with movement, reports that she can \"sort of\" feel touch at left toes, able to perform small wiggle at left toes today.  With ongoing numbness at left LE, pain at right LE, no standing trial attempted.  Cued for rolling in bed with min A in either direction for sling placement.  Lifted to bedside chair with overhead lift with max A at left LE to provide support.  Max A for repositioning in chair.  Nursing present at end of session; critical lab value called in, HgB 6.7.  Not appropriate for other activity this date.   PT Discharge Planning   PT Plan trial sit to stand with pooja steady?  Or walker?   PT Discharge Recommendation (DC Rec) Transitional Care Facility   PT Rationale for DC Rec Patient presents below baseline for functional mobility.  Currently requires overhead lift for all transfers, previously able to pivot to wheelchair.  Recommend discharge to TCU in order to increase strength, activity tolerance and independence with mobility.   PT Brief overview of current status lift dependent       "

## 2024-05-04 NOTE — PLAN OF CARE
"Goal Outcome Evaluation:      Plan of Care Reviewed With: patient, child    Overall Patient Progress: no changeOverall Patient Progress: no change    Patient vital signs are at baseline: No,  Reason:  2L NC  Patient able to ambulate as they were prior to admission or with assist devices provided by therapies during their stay:  No,  Reason:  lift for OOB  Patient MUST void prior to discharge:  Yes  Patient able to tolerate oral intake:  Yes  Pain has adequate pain control using Oral analgesics:  Yes  Does patient have an identified :  Yes  Has goal D/C date and time been discussed with patient:  No,  Reason:  SW following for TCU placement    Problem: Adult Inpatient Plan of Care  Goal: Plan of Care Review  Description: The Plan of Care Review/Shift note should be completed every shift.  The Outcome Evaluation is a brief statement about your assessment that the patient is improving, declining, or no change.  This information will be displayed automatically on your shift  note.  Outcome: Progressing  Flowsheets (Taken 5/4/2024 5927)  Plan of Care Reviewed With:   patient   child  Overall Patient Progress: no change  Goal: Patient-Specific Goal (Individualized)  Description: You can add care plan individualizations to a care plan. Examples of Individualization might be:  \"Parent requests to be called daily at 9am for status\", \"I have a hard time hearing out of my right ear\", or \"Do not touch me to wake me up as it startles  me\".  Outcome: Progressing  Goal: Absence of Hospital-Acquired Illness or Injury  Outcome: Progressing  Intervention: Identify and Manage Fall Risk  Recent Flowsheet Documentation  Taken 5/4/2024 7155 by Lydia Lawton RN  Safety Promotion/Fall Prevention:   activity supervised   assistive device/personal items within reach   clutter free environment maintained   lighting adjusted   nonskid shoes/slippers when out of bed   patient and family education   room organization consistent   safety " round/check completed   supervised activity   treat reversible contributory factors   treat underlying cause  Intervention: Prevent Skin Injury  Recent Flowsheet Documentation  Taken 5/4/2024 0907 by Lydia Lawton RN  Body Position: weight shifting  Skin Protection: adhesive use limited  Device Skin Pressure Protection:   tubing/devices free from skin contact   absorbent pad utilized/changed   adhesive use limited  Intervention: Prevent and Manage VTE (Venous Thromboembolism) Risk  Recent Flowsheet Documentation  Taken 5/4/2024 0907 by Lydia Lawton RN  VTE Prevention/Management: SCDs (sequential compression devices) on  Intervention: Prevent Infection  Recent Flowsheet Documentation  Taken 5/4/2024 0907 by Lydia Lawton RN  Infection Prevention:   hand hygiene promoted   rest/sleep promoted   single patient room provided  Goal: Optimal Comfort and Wellbeing  Outcome: Progressing  Intervention: Monitor Pain and Promote Comfort  Recent Flowsheet Documentation  Taken 5/4/2024 0911 by Lydia Lawton RN  Pain Management Interventions: medication (see MAR)  Goal: Readiness for Transition of Care  Outcome: Progressing     Problem: Pain Acute  Goal: Optimal Pain Control and Function  Outcome: Progressing  Intervention: Develop Pain Management Plan  Recent Flowsheet Documentation  Taken 5/4/2024 0911 by Lydia Lawton RN  Pain Management Interventions: medication (see MAR)  Intervention: Prevent or Manage Pain  Recent Flowsheet Documentation  Taken 5/4/2024 0907 by Lydia Lawton RN  Sensory Stimulation Regulation:   care clustered   lighting decreased   quiet environment promoted  Sleep/Rest Enhancement:   awakenings minimized   noise level reduced   natural light exposure provided   regular sleep/rest pattern promoted   relaxation techniques promoted  Bowel Elimination Promotion:   adequate fluid intake promoted   ambulation promoted   commode/bedpan at bedside  Medication Review/Management: medications  reviewed  Intervention: Optimize Psychosocial Wellbeing  Recent Flowsheet Documentation  Taken 5/4/2024 0907 by Lydia Lawton RN  Supportive Measures: active listening utilized     Problem: Comorbidity Management  Goal: Blood Pressure in Desired Range  Outcome: Progressing  Intervention: Maintain Blood Pressure Management  Recent Flowsheet Documentation  Taken 5/4/2024 0907 by Lydia Lawton RN  Medication Review/Management: medications reviewed     Problem: Orthopaedic Fracture  Goal: Absence of Bleeding  Outcome: Progressing  Intervention: Monitor and Manage Fracture Bleeding  Recent Flowsheet Documentation  Taken 5/4/2024 0907 by Lydia Lawton RN  Bleeding Management:   affected area elevated   dressing monitored  Goal: Bowel Elimination  Outcome: Progressing  Intervention: Promote Effective Bowel Elimination  Recent Flowsheet Documentation  Taken 5/4/2024 0907 by Lydia Lawton RN  Bowel Elimination Management:   hygiene measures promoted   relaxation techniques promoted   toileting offered  Bowel Elimination Promotion:   adequate fluid intake promoted   ambulation promoted   commode/bedpan at bedside  Goal: Absence of Embolism Signs and Symptoms  Outcome: Progressing  Intervention: Prevent or Manage Embolism Risk  Recent Flowsheet Documentation  Taken 5/4/2024 0907 by Lydia Lawton RN  VTE Prevention/Management: SCDs (sequential compression devices) on  Goal: Fracture Stability  Outcome: Progressing  Goal: Optimal Functional Ability  Outcome: Progressing  Intervention: Optimize Functional Ability  Recent Flowsheet Documentation  Taken 5/4/2024 0907 by Lydia Lawton RN  Self-Care Promotion:   independence encouraged   BADL personal objects within reach   meal set-up provided   adaptive equipment use encouraged  Activity Management:   dorsiflexion/plantar flexion performed   up in chair  Positioning/Transfer Devices:   pillows   in use  Goal: Absence of Infection Signs and Symptoms  Outcome:  Progressing  Intervention: Prevent or Manage Infection  Recent Flowsheet Documentation  Taken 5/4/2024 0907 by Lydia Lawton RN  Isolation Precautions: contact precautions maintained  Goal: Effective Tissue Perfusion  Outcome: Progressing  Intervention: Prevent or Manage Neurovascular Compromise  Recent Flowsheet Documentation  Taken 5/4/2024 0907 by Lydia Lawton RN  Neurovascular Pressure Management: Cast: extremity positioned at heart level  Compartment Syndrome Management: extremity placed at heart level  Goal: Optimal Pain Control and Function  Outcome: Progressing  Intervention: Manage Acute Orthopaedic-Related Pain  Recent Flowsheet Documentation  Taken 5/4/2024 0911 by Lydia Lawton RN  Pain Management Interventions: medication (see MAR)  Taken 5/4/2024 0907 by Lydia Lawton RN  Sleep/Rest Enhancement:   awakenings minimized   noise level reduced   natural light exposure provided   regular sleep/rest pattern promoted   relaxation techniques promoted  Goal: Effective Oxygenation and Ventilation  Outcome: Progressing  Intervention: Promote Airway Secretion Clearance  Recent Flowsheet Documentation  Taken 5/4/2024 0907 by Lydia Lawton RN  Breathing Techniques/Airway Clearance: deep/controlled cough encouraged  Cough And Deep Breathing: done independently per patient  Activity Management:   dorsiflexion/plantar flexion performed   up in chair  Intervention: Optimize Oxygenation and Ventilation  Recent Flowsheet Documentation  Taken 5/4/2024 0907 by Lydia Lawton RN  Airway/Ventilation Management:   airway patency maintained   calming measures promoted   pulmonary hygiene promoted  Fluid/Electrolyte Management: fluids provided     Problem: Skin Injury Risk Increased  Goal: Skin Health and Integrity  Outcome: Progressing  Intervention: Plan: Nurse Driven Intervention: Moisture Management  Recent Flowsheet Documentation  Taken 5/4/2024 0907 by Lydia Lawton RN  Moisture Interventions: Urinary  collection device  Intervention: Plan: Nurse Driven Intervention: Friction and Shear  Recent Flowsheet Documentation  Taken 5/4/2024 0907 by Lydia Lawton RN  Friction/Shear Interventions:   HOB 30 degrees or less   Silicone foam sacral dressing   Assistive lifting device (portable/ceiling lift, etc.)  Intervention: Optimize Skin Protection  Recent Flowsheet Documentation  Taken 5/4/2024 0907 by Lydia Lawton, RN  Pressure Reduction Techniques:   frequent weight shift encouraged   heels elevated off bed  Skin Protection: adhesive use limited  Activity Management:   dorsiflexion/plantar flexion performed   up in chair  Intervention: Promote and Optimize Oral Intake  Recent Flowsheet Documentation  Taken 5/4/2024 0907 by Lydia Lawton, RN  Oral Nutrition Promotion:   physical activity promoted   rest periods promoted

## 2024-05-04 NOTE — PLAN OF CARE
"Goal Outcome Evaluation:      Plan of Care Reviewed With: patient    Overall Patient Progress: no changeOverall Patient Progress: no change         Patient confused throughout shift - disoriented to time, place, situation. Reporting pain in right hip. Numbness and tingling in left lower extremity. Pulse in tact, Cap refill <3, pink in color. Repositioned throughout shift to help with patients pain. Tylenol, atarax, and robaxin given. Patient awake from 3AM onward. Attempted to dangle on edge of bed with staff - could not move right hip due to pain. VSS on 2 L oxymask. Denies SOB. Patient refused morning Lovenox injection - education was unsuccessful. Passed on to morning nurse. Pending PT/OT consults.              Problem: Adult Inpatient Plan of Care  Goal: Plan of Care Review  Description: The Plan of Care Review/Shift note should be completed every shift.  The Outcome Evaluation is a brief statement about your assessment that the patient is improving, declining, or no change.  This information will be displayed automatically on your shift  note.  Outcome: Progressing  Flowsheets (Taken 5/4/2024 0519)  Plan of Care Reviewed With: patient  Overall Patient Progress: no change  Goal: Patient-Specific Goal (Individualized)  Description: You can add care plan individualizations to a care plan. Examples of Individualization might be:  \"Parent requests to be called daily at 9am for status\", \"I have a hard time hearing out of my right ear\", or \"Do not touch me to wake me up as it startles  me\".  Outcome: Progressing  Goal: Absence of Hospital-Acquired Illness or Injury  Outcome: Progressing  Intervention: Identify and Manage Fall Risk  Recent Flowsheet Documentation  Taken 5/3/2024 2057 by Soco Becerra, RN  Safety Promotion/Fall Prevention:   activity supervised   clutter free environment maintained   nonskid shoes/slippers when out of bed   patient and family education   room door open   room organization consistent   " safety round/check completed  Intervention: Prevent Skin Injury  Recent Flowsheet Documentation  Taken 5/3/2024 2057 by Soco Becerra RN  Body Position:   turned   left  Skin Protection: adhesive use limited  Device Skin Pressure Protection:   absorbent pad utilized/changed   tubing/devices free from skin contact  Intervention: Prevent and Manage VTE (Venous Thromboembolism) Risk  Recent Flowsheet Documentation  Taken 5/3/2024 2057 by Soco Becerra RN  VTE Prevention/Management: SCDs (sequential compression devices) off  Intervention: Prevent Infection  Recent Flowsheet Documentation  Taken 5/4/2024 0349 by Soco Becerra RN  Infection Prevention:   rest/sleep promoted   single patient room provided  Taken 5/3/2024 2057 by Soco Becerra RN  Infection Prevention:   rest/sleep promoted   single patient room provided  Goal: Optimal Comfort and Wellbeing  Outcome: Progressing  Intervention: Monitor Pain and Promote Comfort  Recent Flowsheet Documentation  Taken 5/3/2024 2054 by Soco Becerra RN  Pain Management Interventions:   repositioned   rest   cold applied  Goal: Readiness for Transition of Care  Outcome: Progressing     Problem: Pain Acute  Goal: Optimal Pain Control and Function  Outcome: Progressing  Intervention: Develop Pain Management Plan  Recent Flowsheet Documentation  Taken 5/3/2024 2054 by Soco Becerra RN  Pain Management Interventions:   repositioned   rest   cold applied  Intervention: Prevent or Manage Pain  Recent Flowsheet Documentation  Taken 5/3/2024 2057 by Soco Becerra RN  Sensory Stimulation Regulation:   care clustered   lighting decreased   quiet environment promoted  Sleep/Rest Enhancement:   awakenings minimized   noise level reduced   regular sleep/rest pattern promoted   relaxation techniques promoted  Bowel Elimination Promotion: adequate fluid intake promoted  Medication Review/Management: medications reviewed  Intervention: Optimize Psychosocial Wellbeing  Recent  Flowsheet Documentation  Taken 5/3/2024 2057 by Soco Becerra RN  Supportive Measures: active listening utilized     Problem: Comorbidity Management  Goal: Blood Pressure in Desired Range  Outcome: Progressing  Intervention: Maintain Blood Pressure Management  Recent Flowsheet Documentation  Taken 5/3/2024 2057 by Soco Becerra RN  Medication Review/Management: medications reviewed     Problem: Orthopaedic Fracture  Goal: Absence of Bleeding  Outcome: Progressing  Intervention: Monitor and Manage Fracture Bleeding  Recent Flowsheet Documentation  Taken 5/3/2024 2057 by Soco Becerra RN  Bleeding Management:   affected area elevated   dressing monitored  Goal: Bowel Elimination  Outcome: Progressing  Intervention: Promote Effective Bowel Elimination  Recent Flowsheet Documentation  Taken 5/3/2024 2057 by Soco Becerra RN  Bowel Elimination Management: relaxation techniques promoted  Bowel Elimination Promotion: adequate fluid intake promoted  Goal: Absence of Embolism Signs and Symptoms  Outcome: Progressing  Intervention: Prevent or Manage Embolism Risk  Recent Flowsheet Documentation  Taken 5/3/2024 2057 by Soco Becerra RN  VTE Prevention/Management: SCDs (sequential compression devices) off  Goal: Fracture Stability  Outcome: Progressing  Goal: Optimal Functional Ability  Outcome: Progressing  Intervention: Optimize Functional Ability  Recent Flowsheet Documentation  Taken 5/3/2024 2057 by Soco Becerra RN  Activity Management: (repositioned)   activity adjusted per tolerance   other (see comments)  Positioning/Transfer Devices:   pillows   in use  Goal: Absence of Infection Signs and Symptoms  Outcome: Progressing  Intervention: Prevent or Manage Infection  Recent Flowsheet Documentation  Taken 5/4/2024 0349 by Soco Becerra RN  Isolation Precautions: contact precautions maintained  Taken 5/3/2024 2057 by Soco Becerra RN  Infection Management: aseptic technique maintained  Isolation  Precautions: contact precautions maintained  Goal: Effective Tissue Perfusion  Outcome: Progressing  Intervention: Prevent or Manage Neurovascular Compromise  Recent Flowsheet Documentation  Taken 5/3/2024 2057 by Soco Becerra RN  Neurovascular Pressure Management: Cast: extremity positioned at heart level  Compartment Syndrome Management: extremity placed at heart level  Goal: Optimal Pain Control and Function  Outcome: Progressing  Intervention: Manage Acute Orthopaedic-Related Pain  Recent Flowsheet Documentation  Taken 5/3/2024 2057 by Soco Becerra RN  Sleep/Rest Enhancement:   awakenings minimized   noise level reduced   regular sleep/rest pattern promoted   relaxation techniques promoted  Taken 5/3/2024 2054 by Soco Becerra RN  Pain Management Interventions:   repositioned   rest   cold applied  Goal: Effective Oxygenation and Ventilation  Outcome: Progressing  Intervention: Promote Airway Secretion Clearance  Recent Flowsheet Documentation  Taken 5/3/2024 2057 by Soco Becerra RN  Breathing Techniques/Airway Clearance: deep/controlled cough encouraged  Cough And Deep Breathing: done independently per patient  Activity Management: (repositioned)   activity adjusted per tolerance   other (see comments)  Intervention: Optimize Oxygenation and Ventilation  Recent Flowsheet Documentation  Taken 5/3/2024 2057 by Soco Becerra RN  Fluid/Electrolyte Management: fluids provided  Head of Bed (HOB) Positioning: HOB at 30 degrees     Problem: Skin Injury Risk Increased  Goal: Skin Health and Integrity  Outcome: Progressing  Intervention: Plan: Nurse Driven Intervention: Moisture Management  Recent Flowsheet Documentation  Taken 5/3/2024 2057 by Soco Becerra RN  Moisture Interventions: Urinary collection device  Intervention: Plan: Nurse Driven Intervention: Friction and Shear  Recent Flowsheet Documentation  Taken 5/3/2024 2057 by Soco Becerra RN  Friction/Shear Interventions: HOB 30 degrees or  less  Intervention: Optimize Skin Protection  Recent Flowsheet Documentation  Taken 5/3/2024 2057 by Soco Becerra, RN  Skin Protection: adhesive use limited  Activity Management: (repositioned)   activity adjusted per tolerance   other (see comments)  Head of Bed (HOB) Positioning: HOB at 30 degrees  Intervention: Promote and Optimize Oral Intake  Recent Flowsheet Documentation  Taken 5/3/2024 2057 by Scoo Becerra, RN  Oral Nutrition Promotion: rest periods promoted

## 2024-05-04 NOTE — PROGRESS NOTES
Ortho Daily Progress Note  Daya reports left leg pain this morning.  Her leg is elevated with ice.  She reports some tightness from the splint.  Unable to fully describe her pain or symptoms.  Denies any numbness.  No other concerns.    Temp:  [97  F (36.1  C)-98.8  F (37.1  C)] 98.8  F (37.1  C)  Pulse:  [68-79] 75  Resp:  [8-21] 21  BP: (112-155)/(36-81) 135/52  SpO2:  [92 %-100 %] 93 %  Hemoglobin   Date Value Ref Range Status   05/04/2024 6.7 (LL) 11.7 - 15.7 g/dL Final   05/22/2021 9.2 (L) 11.7 - 15.7 g/dL Final   ]    Alert, appropriate, and following commands  Breathing easily without wheeze    Left lower extremity:  Splint intact.  Ace wrap around the splint was removed and loosened  Leg was elevated  Sensations intact to light touch in the superficial peroneal, deep peroneal, tibial nerve distributions  FHL, EHL intact  Dorsalis pedis pulse 2+, good capillary refill      PACU radiographs reviewed.  Imaging reveals a left tibia and fibula nail.  Hardware appears in good position.  Splint material intact    A/P - 89 year old female who is postoperative day 1 status post a left distal tibia and fibula ORIF with intramedullary nails.  She is doing well postoperatively.  She does have some increased left lower leg pain.  The splint was loosened to determine if this gives her some pain relief.  Nursing aware of pain status.  Will avoid IV pain medications if possible and try oxycodone 2.5mg versus switching to dilaudid.    1.  Ancef x 24 hours.   2.  Lovenox while in the hospital for DVT prophylaxis may transition to ASA 325mg on D/C  3.  PACU x-rays.   4.  NWB in a short leg splint. Plan to remove splint at 2-3 weeks and place in a boot pending xrays. Will consider WBAT in a boot at 2-3 weeks pending xrays.   5.  Physical therapy/occupational therapy.   6.  OK to shower.  No submerging wound. Keep splint dry  7.  Discharge:  Case management social work consult for placement    Colin Evans MD

## 2024-05-04 NOTE — PLAN OF CARE
"Goal Outcome Evaluation:      Plan of Care Reviewed With: patient    Overall Patient Progress: no changeOverall Patient Progress: no change    Patient vital signs are at baseline: No,  Reason:  6L oxymask  Patient able to ambulate as they were prior to admission or with assist devices provided by therapies during their stay:  No,  Reason:  has not been OOB yet  Patient MUST void prior to discharge:  No,  Reason:  vallecillo in place  Patient able to tolerate oral intake:  Yes  Pain has adequate pain control using Oral analgesics:  Yes  Does patient have an identified :  Yes, son  Has goal D/C date and time been discussed with patient:  No,  Reason:  discharge date/time TBD pending therapy eval    Problem: Adult Inpatient Plan of Care  Goal: Plan of Care Review  Description: The Plan of Care Review/Shift note should be completed every shift.  The Outcome Evaluation is a brief statement about your assessment that the patient is improving, declining, or no change.  This information will be displayed automatically on your shift  note.  Outcome: Progressing  Flowsheets (Taken 5/3/2024 2009)  Plan of Care Reviewed With: patient  Overall Patient Progress: no change  Goal: Patient-Specific Goal (Individualized)  Description: You can add care plan individualizations to a care plan. Examples of Individualization might be:  \"Parent requests to be called daily at 9am for status\", \"I have a hard time hearing out of my right ear\", or \"Do not touch me to wake me up as it startles  me\".  Outcome: Progressing  Goal: Absence of Hospital-Acquired Illness or Injury  Outcome: Progressing  Intervention: Identify and Manage Fall Risk  Recent Flowsheet Documentation  Taken 5/3/2024 1433 by Lydia Lawton RN  Safety Promotion/Fall Prevention:   activity supervised   assistive device/personal items within reach   clutter free environment maintained   lighting adjusted   mobility aid in reach   nonskid shoes/slippers when out of bed   " patient and family education   room organization consistent   safety round/check completed   supervised activity   treat reversible contributory factors   treat underlying cause  Intervention: Prevent Skin Injury  Recent Flowsheet Documentation  Taken 5/3/2024 1433 by Lydia Lawton RN  Body Position:   side-lying   right  Skin Protection: adhesive use limited  Device Skin Pressure Protection:   absorbent pad utilized/changed   tubing/devices free from skin contact  Intervention: Prevent Infection  Recent Flowsheet Documentation  Taken 5/3/2024 1433 by Lydia Lawton RN  Infection Prevention:   hand hygiene promoted   rest/sleep promoted   single patient room provided   personal protective equipment utilized  Goal: Optimal Comfort and Wellbeing  Outcome: Progressing  Goal: Readiness for Transition of Care  Outcome: Progressing     Problem: Pain Acute  Goal: Optimal Pain Control and Function  Outcome: Progressing  Intervention: Prevent or Manage Pain  Recent Flowsheet Documentation  Taken 5/3/2024 1433 by Lydia Lawotn RN  Sensory Stimulation Regulation:   care clustered   lighting decreased   quiet environment promoted  Sleep/Rest Enhancement:   awakenings minimized   noise level reduced   regular sleep/rest pattern promoted   relaxation techniques promoted  Bowel Elimination Promotion:   adequate fluid intake promoted   commode/bedpan at bedside  Medication Review/Management: medications reviewed     Problem: Comorbidity Management  Goal: Blood Pressure in Desired Range  Outcome: Progressing  Intervention: Maintain Blood Pressure Management  Recent Flowsheet Documentation  Taken 5/3/2024 1433 by Lydia Lawton RN  Medication Review/Management: medications reviewed     Problem: Orthopaedic Fracture  Goal: Absence of Bleeding  Outcome: Progressing  Intervention: Monitor and Manage Fracture Bleeding  Recent Flowsheet Documentation  Taken 5/3/2024 1433 by Lydia Lawton RN  Bleeding Management:   affected area  elevated   dressing monitored  Goal: Bowel Elimination  Outcome: Progressing  Intervention: Promote Effective Bowel Elimination  Recent Flowsheet Documentation  Taken 5/3/2024 1433 by Lydia Lawton RN  Bowel Elimination Management: relaxation techniques promoted  Bowel Elimination Promotion:   adequate fluid intake promoted   commode/bedpan at bedside  Goal: Absence of Embolism Signs and Symptoms  Outcome: Progressing  Goal: Fracture Stability  Outcome: Progressing  Goal: Optimal Functional Ability  Outcome: Progressing  Intervention: Optimize Functional Ability  Recent Flowsheet Documentation  Taken 5/3/2024 1433 by Lydia Lawton RN  Self-Care Promotion:   independence encouraged   BADL personal objects within reach   BADL personal routines maintained   meal set-up provided   adaptive equipment use encouraged  Activity Management: dorsiflexion/plantar flexion performed  Positioning/Transfer Devices:   pillows   in use  Goal: Absence of Infection Signs and Symptoms  Outcome: Progressing  Intervention: Prevent or Manage Infection  Recent Flowsheet Documentation  Taken 5/3/2024 1433 by Lydia Lawton RN  Isolation Precautions: contact precautions maintained  Goal: Effective Tissue Perfusion  Outcome: Progressing  Intervention: Prevent or Manage Neurovascular Compromise  Recent Flowsheet Documentation  Taken 5/3/2024 1433 by Lydia Lawton RN  Neurovascular Pressure Management: Cast: extremity positioned at heart level  Compartment Syndrome Management: extremity placed at heart level  Goal: Optimal Pain Control and Function  Outcome: Progressing  Intervention: Manage Acute Orthopaedic-Related Pain  Recent Flowsheet Documentation  Taken 5/3/2024 1433 by Lydia Lawton RN  Sleep/Rest Enhancement:   awakenings minimized   noise level reduced   regular sleep/rest pattern promoted   relaxation techniques promoted  Goal: Effective Oxygenation and Ventilation  Outcome: Progressing  Intervention: Promote Airway  Secretion Clearance  Recent Flowsheet Documentation  Taken 5/3/2024 1433 by Lydia Lawton RN  Breathing Techniques/Airway Clearance: deep/controlled cough encouraged  Cough And Deep Breathing: done independently per patient  Activity Management: dorsiflexion/plantar flexion performed  Intervention: Optimize Oxygenation and Ventilation  Recent Flowsheet Documentation  Taken 5/3/2024 1433 by Lydia Lawton RN  Airway/Ventilation Management:   airway patency maintained   calming measures promoted   pulmonary hygiene promoted  Fluid/Electrolyte Management: fluids provided     Problem: Skin Injury Risk Increased  Goal: Skin Health and Integrity  Outcome: Progressing  Intervention: Plan: Nurse Driven Intervention: Moisture Management  Recent Flowsheet Documentation  Taken 5/3/2024 1433 by Lydia Lawton RN  Moisture Interventions:   Encourage regular toileting   Urinary collection device  Intervention: Plan: Nurse Driven Intervention: Friction and Shear  Recent Flowsheet Documentation  Taken 5/3/2024 1433 by Lydia Lawton RN  Friction/Shear Interventions: HOB 30 degrees or less  Intervention: Optimize Skin Protection  Recent Flowsheet Documentation  Taken 5/3/2024 1433 by Lydia Lawton RN  Pressure Reduction Techniques:   frequent weight shift encouraged   positioned off wounds  Skin Protection: adhesive use limited  Activity Management: dorsiflexion/plantar flexion performed  Intervention: Promote and Optimize Oral Intake  Recent Flowsheet Documentation  Taken 5/3/2024 1433 by Lydia Lawton RN  Oral Nutrition Promotion:   physical activity promoted   rest periods promoted

## 2024-05-04 NOTE — PROVIDER NOTIFICATION
Pt repeatedly complained of numbness to L LE. Unable to wiggle toes or feel sensation on foot/toes. Able to feel sensation above splint at knee area. Toes arm to touch, pink, good cap refill. Pt did receive a block with her surgery. Discussed with on-call TCO Alvino AVILA, and he stated it is most likely related to the block she received and should wear off in 18-24 hours. Confirmed with him that pt does have good cap refill and other CMS intact. Will cont to monitor.

## 2024-05-05 ENCOUNTER — APPOINTMENT (OUTPATIENT)
Dept: OCCUPATIONAL THERAPY | Facility: CLINIC | Age: 89
DRG: 493 | End: 2024-05-05
Attending: INTERNAL MEDICINE
Payer: COMMERCIAL

## 2024-05-05 LAB
BACTERIA UR CULT: NORMAL
ERYTHROCYTE [DISTWIDTH] IN BLOOD BY AUTOMATED COUNT: 16.9 % (ref 10–15)
GLUCOSE SERPL-MCNC: 102 MG/DL (ref 70–99)
HCT VFR BLD AUTO: 26.5 % (ref 35–47)
HGB BLD-MCNC: 9.1 G/DL (ref 11.7–15.7)
MCH RBC QN AUTO: 33.5 PG (ref 26.5–33)
MCHC RBC AUTO-ENTMCNC: 34.3 G/DL (ref 31.5–36.5)
MCV RBC AUTO: 97 FL (ref 78–100)
PLATELET # BLD AUTO: 176 10E3/UL (ref 150–450)
RBC # BLD AUTO: 2.72 10E6/UL (ref 3.8–5.2)
WBC # BLD AUTO: 12.7 10E3/UL (ref 4–11)

## 2024-05-05 PROCEDURE — 97166 OT EVAL MOD COMPLEX 45 MIN: CPT | Mod: GO | Performed by: OCCUPATIONAL THERAPIST

## 2024-05-05 PROCEDURE — 97535 SELF CARE MNGMENT TRAINING: CPT | Mod: GO | Performed by: OCCUPATIONAL THERAPIST

## 2024-05-05 PROCEDURE — 250N000013 HC RX MED GY IP 250 OP 250 PS 637: Performed by: INTERNAL MEDICINE

## 2024-05-05 PROCEDURE — 250N000013 HC RX MED GY IP 250 OP 250 PS 637

## 2024-05-05 PROCEDURE — 36415 COLL VENOUS BLD VENIPUNCTURE: CPT | Performed by: STUDENT IN AN ORGANIZED HEALTH CARE EDUCATION/TRAINING PROGRAM

## 2024-05-05 PROCEDURE — 85027 COMPLETE CBC AUTOMATED: CPT | Performed by: STUDENT IN AN ORGANIZED HEALTH CARE EDUCATION/TRAINING PROGRAM

## 2024-05-05 PROCEDURE — 99232 SBSQ HOSP IP/OBS MODERATE 35: CPT | Performed by: STUDENT IN AN ORGANIZED HEALTH CARE EDUCATION/TRAINING PROGRAM

## 2024-05-05 PROCEDURE — 82947 ASSAY GLUCOSE BLOOD QUANT: CPT | Performed by: STUDENT IN AN ORGANIZED HEALTH CARE EDUCATION/TRAINING PROGRAM

## 2024-05-05 PROCEDURE — 250N000013 HC RX MED GY IP 250 OP 250 PS 637: Performed by: STUDENT IN AN ORGANIZED HEALTH CARE EDUCATION/TRAINING PROGRAM

## 2024-05-05 PROCEDURE — 120N000001 HC R&B MED SURG/OB

## 2024-05-05 PROCEDURE — 250N000011 HC RX IP 250 OP 636: Performed by: STUDENT IN AN ORGANIZED HEALTH CARE EDUCATION/TRAINING PROGRAM

## 2024-05-05 RX ORDER — ACETAMINOPHEN 325 MG/1
650 TABLET ORAL EVERY 4 HOURS PRN
Qty: 75 TABLET | Refills: 0 | DISCHARGE
Start: 2024-05-06 | End: 2024-05-09

## 2024-05-05 RX ORDER — HYDROXYZINE HYDROCHLORIDE 10 MG/1
10 TABLET, FILM COATED ORAL EVERY 6 HOURS PRN
Qty: 30 TABLET | Refills: 0 | DISCHARGE
Start: 2024-05-05 | End: 2024-05-09

## 2024-05-05 RX ORDER — METHOCARBAMOL 500 MG/1
500 TABLET, FILM COATED ORAL EVERY 6 HOURS PRN
Qty: 30 TABLET | Refills: 0 | DISCHARGE
Start: 2024-05-05 | End: 2024-05-23

## 2024-05-05 RX ORDER — AMOXICILLIN 250 MG
1 CAPSULE ORAL 2 TIMES DAILY PRN
Qty: 30 TABLET | Refills: 0 | Status: ON HOLD | DISCHARGE
Start: 2024-05-05 | End: 2024-05-30

## 2024-05-05 RX ORDER — ENOXAPARIN SODIUM 100 MG/ML
30 INJECTION SUBCUTANEOUS EVERY 24 HOURS
Status: DISCONTINUED | OUTPATIENT
Start: 2024-05-05 | End: 2024-05-06

## 2024-05-05 RX ORDER — ASPIRIN 325 MG
325 TABLET ORAL DAILY
Status: DISCONTINUED | OUTPATIENT
Start: 2024-05-05 | End: 2024-05-06

## 2024-05-05 RX ORDER — ASPIRIN 325 MG
325 TABLET ORAL DAILY
Qty: 42 TABLET | Refills: 0 | Status: ON HOLD | DISCHARGE
Start: 2024-05-05 | End: 2024-05-30

## 2024-05-05 RX ORDER — OXYCODONE HYDROCHLORIDE 5 MG/1
2.5 TABLET ORAL EVERY 4 HOURS PRN
Qty: 12 TABLET | Refills: 0 | Status: SHIPPED | OUTPATIENT
Start: 2024-05-05 | End: 2024-05-17

## 2024-05-05 RX ADMIN — LISINOPRIL 30 MG: 20 TABLET ORAL at 12:37

## 2024-05-05 RX ADMIN — ACETAMINOPHEN 975 MG: 325 TABLET, FILM COATED ORAL at 16:38

## 2024-05-05 RX ADMIN — CARVEDILOL 12.5 MG: 12.5 TABLET, FILM COATED ORAL at 08:00

## 2024-05-05 RX ADMIN — AMLODIPINE BESYLATE 10 MG: 10 TABLET ORAL at 08:00

## 2024-05-05 RX ADMIN — GUAIFENESIN 600 MG: 600 TABLET, EXTENDED RELEASE ORAL at 08:00

## 2024-05-05 RX ADMIN — ISOSORBIDE DINITRATE 10 MG: 10 TABLET ORAL at 08:00

## 2024-05-05 RX ADMIN — ACETAMINOPHEN 975 MG: 325 TABLET, FILM COATED ORAL at 02:28

## 2024-05-05 RX ADMIN — CLONIDINE HYDROCHLORIDE 0.2 MG: 0.1 TABLET ORAL at 21:41

## 2024-05-05 RX ADMIN — CARVEDILOL 12.5 MG: 12.5 TABLET, FILM COATED ORAL at 18:28

## 2024-05-05 RX ADMIN — ACETAMINOPHEN 975 MG: 325 TABLET, FILM COATED ORAL at 09:09

## 2024-05-05 RX ADMIN — FERROUS GLUCONATE 324 MG: 324 TABLET ORAL at 08:00

## 2024-05-05 RX ADMIN — ISOSORBIDE DINITRATE 10 MG: 10 TABLET ORAL at 14:51

## 2024-05-05 RX ADMIN — SERTRALINE HYDROCHLORIDE 100 MG: 100 TABLET ORAL at 08:00

## 2024-05-05 RX ADMIN — PANTOPRAZOLE SODIUM 40 MG: 40 TABLET, DELAYED RELEASE ORAL at 08:00

## 2024-05-05 RX ADMIN — ISOSORBIDE DINITRATE 10 MG: 10 TABLET ORAL at 19:46

## 2024-05-05 RX ADMIN — GUAIFENESIN 600 MG: 600 TABLET, EXTENDED RELEASE ORAL at 19:45

## 2024-05-05 ASSESSMENT — ACTIVITIES OF DAILY LIVING (ADL)
ADLS_ACUITY_SCORE: 49
ADLS_ACUITY_SCORE: 57
ADLS_ACUITY_SCORE: 49
ADLS_ACUITY_SCORE: 57
ADLS_ACUITY_SCORE: 49
ADLS_ACUITY_SCORE: 57
ADLS_ACUITY_SCORE: 57
ADLS_ACUITY_SCORE: 49
ADLS_ACUITY_SCORE: 57
ADLS_ACUITY_SCORE: 49
ADLS_ACUITY_SCORE: 57
ADLS_ACUITY_SCORE: 57

## 2024-05-05 NOTE — PLAN OF CARE
"Goal Outcome Evaluation:      Plan of Care Reviewed With: patient    Overall Patient Progress: no changeOverall Patient Progress: no change    Patient vital signs are at baseline: No,  Reason:  still requiring 2L NC  Patient able to ambulate as they were prior to admission or with assist devices provided by therapies during their stay:  No,  Reason:  lift   Patient MUST void prior to discharge:  Yes  Patient able to tolerate oral intake:  Yes  Pain has adequate pain control using Oral analgesics:  Yes  Does patient have an identified :  Yes  Has goal D/C date and time been discussed with patient:  Yes    Problem: Adult Inpatient Plan of Care  Goal: Plan of Care Review  Description: The Plan of Care Review/Shift note should be completed every shift.  The Outcome Evaluation is a brief statement about your assessment that the patient is improving, declining, or no change.  This information will be displayed automatically on your shift  note.  5/5/2024 1629 by Lydia Lawton RN  Outcome: Not Progressing  Flowsheets (Taken 5/5/2024 1629)  Plan of Care Reviewed With: patient  Overall Patient Progress: no change  5/5/2024 1629 by Lydia Lawton RN  Outcome: Progressing  Flowsheets (Taken 5/5/2024 1629)  Plan of Care Reviewed With: patient  Overall Patient Progress: no change  Goal: Patient-Specific Goal (Individualized)  Description: You can add care plan individualizations to a care plan. Examples of Individualization might be:  \"Parent requests to be called daily at 9am for status\", \"I have a hard time hearing out of my right ear\", or \"Do not touch me to wake me up as it startles  me\".  5/5/2024 1629 by Lydia Lawton RN  Outcome: Not Progressing  5/5/2024 1629 by Lydia Lawton, RN  Outcome: Progressing  Goal: Absence of Hospital-Acquired Illness or Injury  5/5/2024 1629 by Lydia Lawton RN  Outcome: Not Progressing  5/5/2024 1629 by Lydia Lawton, RN  Outcome: Progressing  Intervention: Identify and Manage " Fall Risk  Recent Flowsheet Documentation  Taken 5/5/2024 0909 by Lydia Latwon RN  Safety Promotion/Fall Prevention:   activity supervised   assistive device/personal items within reach   clutter free environment maintained   lighting adjusted   nonskid shoes/slippers when out of bed   patient and family education   room organization consistent   safety round/check completed   supervised activity   treat reversible contributory factors   treat underlying cause  Intervention: Prevent Skin Injury  Recent Flowsheet Documentation  Taken 5/5/2024 0909 by Lydia Lawton RN  Skin Protection:   adhesive use limited   incontinence pads utilized  Device Skin Pressure Protection:   absorbent pad utilized/changed   adhesive use limited   tubing/devices free from skin contact  Intervention: Prevent and Manage VTE (Venous Thromboembolism) Risk  Recent Flowsheet Documentation  Taken 5/5/2024 0909 by Lydia Lawton RN  VTE Prevention/Management: SCDs (sequential compression devices) on  Intervention: Prevent Infection  Recent Flowsheet Documentation  Taken 5/5/2024 0909 by Lydia Lawton RN  Infection Prevention:   hand hygiene promoted   rest/sleep promoted   personal protective equipment utilized   single patient room provided  Goal: Optimal Comfort and Wellbeing  5/5/2024 1629 by Lydia Lawton RN  Outcome: Not Progressing  5/5/2024 1629 by Lydia Lawton RN  Outcome: Progressing  Goal: Readiness for Transition of Care  5/5/2024 1629 by Lydia Lawton RN  Outcome: Not Progressing  5/5/2024 1629 by Lydia Lawton RN  Outcome: Progressing     Problem: Pain Acute  Goal: Optimal Pain Control and Function  5/5/2024 1629 by Lydia Lawton RN  Outcome: Not Progressing  5/5/2024 1629 by Lydia Lawton RN  Outcome: Progressing  Intervention: Prevent or Manage Pain  Recent Flowsheet Documentation  Taken 5/5/2024 0909 by Lydia Lawton RN  Sensory Stimulation Regulation:   care clustered   lighting decreased   quiet  environment promoted  Sleep/Rest Enhancement:   awakenings minimized   noise level reduced   regular sleep/rest pattern promoted   relaxation techniques promoted  Bowel Elimination Promotion:   ambulation promoted   adequate fluid intake promoted   commode/bedpan at bedside  Medication Review/Management: medications reviewed  Intervention: Optimize Psychosocial Wellbeing  Recent Flowsheet Documentation  Taken 5/5/2024 0909 by Lydia Lawton RN  Supportive Measures: active listening utilized     Problem: Comorbidity Management  Goal: Blood Pressure in Desired Range  5/5/2024 1629 by Lydia Lawton RN  Outcome: Not Progressing  5/5/2024 1629 by Lydia Lawton RN  Outcome: Progressing  Intervention: Maintain Blood Pressure Management  Recent Flowsheet Documentation  Taken 5/5/2024 0909 by Lydia Lawton RN  Medication Review/Management: medications reviewed     Problem: Orthopaedic Fracture  Goal: Absence of Bleeding  5/5/2024 1629 by Lydia Lawton RN  Outcome: Not Progressing  5/5/2024 1629 by Lydia Lawton RN  Outcome: Progressing  Intervention: Monitor and Manage Fracture Bleeding  Recent Flowsheet Documentation  Taken 5/5/2024 0909 by Lydia Lawton RN  Bleeding Management:   affected area elevated   dressing monitored  Goal: Bowel Elimination  5/5/2024 1629 by Lydia Lawton RN  Outcome: Not Progressing  5/5/2024 1629 by Lydia Lawton RN  Outcome: Progressing  Intervention: Promote Effective Bowel Elimination  Recent Flowsheet Documentation  Taken 5/5/2024 0909 by Lydia Lawton RN  Bowel Elimination Management:   hygiene measures promoted   sitting position facilitated   toileting offered   relaxation techniques promoted  Bowel Elimination Promotion:   ambulation promoted   adequate fluid intake promoted   commode/bedpan at bedside  Goal: Absence of Embolism Signs and Symptoms  5/5/2024 1629 by Lydia Lawton RN  Outcome: Not Progressing  5/5/2024 1629 by Lydia Lawton RN  Outcome: Not  Progressing  Intervention: Prevent or Manage Embolism Risk  Recent Flowsheet Documentation  Taken 5/5/2024 0909 by Lydia Lawton RN  VTE Prevention/Management: SCDs (sequential compression devices) on  Goal: Fracture Stability  5/5/2024 1629 by Lydia Lawton RN  Outcome: Not Progressing  5/5/2024 1629 by Lydia Lawton RN  Outcome: Not Progressing  Goal: Optimal Functional Ability  5/5/2024 1629 by Lydia Lawton RN  Outcome: Not Progressing  5/5/2024 1629 by Lydia Lawton RN  Outcome: Not Progressing  Intervention: Optimize Functional Ability  Recent Flowsheet Documentation  Taken 5/5/2024 0909 by Lydia Lawton RN  Self-Care Promotion:   independence encouraged   BADL personal objects within reach   BADL personal routines maintained   meal set-up provided   adaptive equipment use encouraged  Activity Management:   dorsiflexion/plantar flexion performed   up in chair  Goal: Absence of Infection Signs and Symptoms  5/5/2024 1629 by Lydia Lawton RN  Outcome: Not Progressing  5/5/2024 1629 by Lydia Lawton RN  Outcome: Not Progressing  Intervention: Prevent or Manage Infection  Recent Flowsheet Documentation  Taken 5/5/2024 0909 by Lydia Lawton RN  Isolation Precautions: contact precautions maintained  Goal: Effective Tissue Perfusion  5/5/2024 1629 by Lydia Lawton RN  Outcome: Not Progressing  5/5/2024 1629 by Lydia Lawton RN  Outcome: Not Progressing  Intervention: Prevent or Manage Neurovascular Compromise  Recent Flowsheet Documentation  Taken 5/5/2024 0909 by Lydia Lawton RN  Neurovascular Pressure Management: Cast:   ice/cold therapy performed   extremity positioned at heart level  Compartment Syndrome Management:   active flexion/extension encouraged   extremity placed at heart level  Goal: Optimal Pain Control and Function  5/5/2024 1629 by Lydia Lawton RN  Outcome: Not Progressing  5/5/2024 1629 by Lydia Lawton RN  Outcome: Not Progressing  Intervention: Manage Acute  Orthopaedic-Related Pain  Recent Flowsheet Documentation  Taken 5/5/2024 0909 by Lydia Lawton RN  Sleep/Rest Enhancement:   awakenings minimized   noise level reduced   regular sleep/rest pattern promoted   relaxation techniques promoted  Goal: Effective Oxygenation and Ventilation  5/5/2024 1629 by Lydia Lawton RN  Outcome: Not Progressing  5/5/2024 1629 by Lydia Lawton RN  Outcome: Not Progressing  Intervention: Promote Airway Secretion Clearance  Recent Flowsheet Documentation  Taken 5/5/2024 0909 by Lydia Lawton RN  Breathing Techniques/Airway Clearance: deep/controlled cough encouraged  Cough And Deep Breathing: done independently per patient  Activity Management:   dorsiflexion/plantar flexion performed   up in chair  Intervention: Optimize Oxygenation and Ventilation  Recent Flowsheet Documentation  Taken 5/5/2024 0909 by Lydia Lawton RN  Airway/Ventilation Management:   airway patency maintained   calming measures promoted   pulmonary hygiene promoted  Fluid/Electrolyte Management: fluids provided     Problem: Skin Injury Risk Increased  Goal: Skin Health and Integrity  5/5/2024 1629 by Lydia Lawton RN  Outcome: Not Progressing  5/5/2024 1629 by Lydia Lawton RN  Outcome: Not Progressing  Intervention: Plan: Nurse Driven Intervention: Moisture Management  Recent Flowsheet Documentation  Taken 5/5/2024 0909 by Lydia Lawton RN  Moisture Interventions:   Encourage regular toileting   Incontinence pad   Urinary collection device  Intervention: Plan: Nurse Driven Intervention: Friction and Shear  Recent Flowsheet Documentation  Taken 5/5/2024 0909 by Lydia Lawton RN  Friction/Shear Interventions:   HOB 30 degrees or less   Silicone foam sacral dressing   Assistive lifting device (portable/ceiling lift, etc.)  Intervention: Optimize Skin Protection  Recent Flowsheet Documentation  Taken 5/5/2024 0909 by Lydia Lawton RN  Pressure Reduction Techniques:   frequent weight shift  encouraged   heels elevated off bed  Pressure Reduction Devices: heel offloading device utilized  Skin Protection:   adhesive use limited   incontinence pads utilized  Activity Management:   dorsiflexion/plantar flexion performed   up in chair  Intervention: Promote and Optimize Oral Intake  Recent Flowsheet Documentation  Taken 5/5/2024 0909 by Lydia Lawton, RN  Oral Nutrition Promotion:   physical activity promoted   rest periods promoted

## 2024-05-05 NOTE — PROGRESS NOTES
05/05/24 0816   Appointment Info   Signing Clinician's Name / Credentials (OT) Chanda Mosley OTR/L   Rehab Comments (OT) L LE NWB   Living Environment   People in Home facility resident   Current Living Arrangements assisted living   Home Accessibility no concerns   Living Environment Comments Per social work note, patient pivots between bed/toilet/wheelchair at baseline with assist, is independent with wheelchair use for all mobility.  Receives assistance for bathing, cooking, laundry, meal prep, med management and transportation.   Self-Care   Usual Activity Tolerance fair   Current Activity Tolerance poor   Equipment Currently Used at Home walker, rolling   Fall history within last six months yes   Number of times patient has fallen within last six months 1   Activity/Exercise/Self-Care Comment Pt. modified independent with ADLs at care home and does do some light meal prep, toileting, and showering with assistance.   General Information   Onset of Illness/Injury or Date of Surgery 05/02/24   Referring Physician Jermaine Stevens   Patient/Family Therapy Goal Statement (OT) would like to get stronger and back to being independent   Additional Occupational Profile Info/Pertinent History of Current Problem 89 year old female with PMH significant for HTN, MDD, anxiety, mild cognitive impairment, anemia, chronic back pain from compression fractures, UTI, GI bleed, nephrolithiasis, C. difficile, and falls with previous fractures including right tib/fib, pubic rami, and hip who presented  from her care home after an unwitnessed fall and found to have comminuted left distal tibia and distal fibula shaft fractures seen POD#1 s/p Left tibia shaft ORIF with intramedullary nail, left Mathias C lateral malleolus ORIF with retrograde fibular nail.   Existing Precautions/Restrictions fall;weight bearing;oxygen therapy device and L/min   Left Lower Extremity (Weight-bearing Status) non weight-bearing (NWB)   Cognitive Status Examination  "  Orientation Status person;place;time   Follows Commands follows one-step commands   Safety Deficit insight into deficits/self-awareness   Memory Deficit minimal deficit   Cognitive Status Comments Pt. benefits from single step directions and reminders on NWB.  Pt. pleasant, cooperative, and motivated to participate.   Visual Perception   Visual Impairment/Limitations corrective lenses full-time   Sensory   Sensory Comments Pt. reports tingling \"sleepy\" feeling in L LE   Pain Assessment   Patient Currently in Pain No   Range of Motion Comprehensive   Comment, General Range of Motion B UE WFL   Strength Comprehensive (MMT)   Comment, General Manual Muscle Testing (MMT) Assessment NT due to soreness, decreased B UE strength noted   Coordination   Upper Extremity Coordination No deficits were identified   Bed Mobility   Comment (Bed Mobility) max A x 2   Transfers   Transfer Comments max A x 2, lift for OOB   Balance   Balance Assessment sitting static balance;sitting dynamic balance;sit to stand dynamic balance;standing static balance   Balance Comments Pt. needing CGA with sitting at EOB   Activities of Daily Living   BADL Assessment/Intervention lower body dressing   Lower Body Dressing Assessment/Training   Comment, (Lower Body Dressing) pt. needing setup and max A for LE dressing   Clinical Impression   Criteria for Skilled Therapeutic Interventions Met (OT) Yes, treatment indicated   OT Diagnosis impaired independence and participation in ADLs   OT Problem List-Impairments impacting ADL problems related to;activity tolerance impaired;balance;cognition;hearing;strength;post-surgical precautions   Assessment of Occupational Performance 3-5 Performance Deficits   Identified Performance Deficits decreased LE dressing, decreased functional transfers, decreased standing tolerance, decreased G/H, decreased functional ambulation   Planned Therapy Interventions (OT) ADL retraining;strengthening;transfer " training;progressive activity/exercise   Clinical Decision Making Complexity (OT) detailed assessment/moderate complexity   Risk & Benefits of therapy have been explained evaluation/treatment results reviewed;care plan/treatment goals reviewed;risks/benefits reviewed;current/potential barriers reviewed;participants voiced agreement with care plan;participants included;patient   OT Total Evaluation Time   OT Eval, Moderate Complexity Minutes (98118) 8   OT Goals   Therapy Frequency (OT) 5 times/week   OT Predicted Duration/Target Date for Goal Attainment 05/11/24   OT Goals Hygiene/Grooming;Lower Body Dressing;Toilet Transfer/Toileting;OT Goal 1   OT: Hygiene/Grooming from wheelchair;supervision/stand-by assist   OT: Lower Body Dressing Minimal assist;using adaptive equipment;from wheelchair   OT: Toilet Transfer/Toileting Minimal assist;toilet transfer;using adaptive equipment   OT: Goal 1 Pt. will complete 10-12 minutes of B UE HEP for increased strength and endurance for return to ADLs.   Self-Care/Home Management   Self-Care/Home Mgmt/ADL, Compensatory, Meal Prep Minutes (82789) 24   Symptoms Noted During/After Treatment (Meal Preparation/Planning Training) fatigue   Treatment Detail/Skilled Intervention Pt. supine in bed upon arrival and agreeable to therapy.  Due to decreased hearing, pocket talker used.  Pt. needing max A x 2 for supine to sit at EOB.  Pt. needing CGA to min A for sitting at EOB.  Pt. needing mod A x 2 for sit to stand and for balance while standing.  Pt. noted fatigue and decreased balance with standing.  Pt. needing mod A x 2 for return to sit at EOB.  Pt. motivated but needing single step directions, increased time to complete directions.  Pt. noted to be incontinent of stool and urine.  Pt. needing max A x 2 for sit to supine and scooting up in bed.  NSG present to complete pericare.  Pt. thankful for therapy and motivated to participate later.   OT Discharge Planning   OT Plan pivot  transfer to commode vs Tiff Steady/lift to chair for LE dressing G/H after setup, B UE HEP   OT Discharge Recommendation (DC Rec) Transitional Care Facility   OT Rationale for DC Rec Pt. presents below baseline for functional ambulation, functional transfers, and LE dressing for safe return to residential.  Pt. was modified independent with ADLs and setup and assist for toielting and showering at baseline.  Recommend continued IP OT and discharge to TCU for advance ADL training, functional transfer training, progressive exercise needed for safe return to ADLs and IADLs.   OT Brief overview of current status Pt. needing max A for bed mobility and sit to stand x 2 and lift for bed to chair.   OT Equipment Needed at Discharge shower chair;reacher;commode chair

## 2024-05-05 NOTE — PROGRESS NOTES
[facility]    Medicine Progress Note - Hospitalist Service    Date of Admission:  5/1/2024    Assessment & Plan   Summary: Daya Ignacio is a 89 year old female with PMHx of HTN, MDD, anxiety, mild cognitive impairment, anemia, chronic back pain from compression fractures, GIB, nephrolithiasis, C diff, and falls with previous fractures of R tib/fib, pubic rami, and hip, who was admitted on 5/1/2024 with unwitnessed fall at [facility] and found to have comminuted L distal fibula shaft fractures.      Unwitnessed fall  Acute comminuted left distal tibia and distal fibula/malleolar fractures s/p ORIF 5/3/24  Chronic back pain  History spinal compression fractures  -Status post ORIF 5/3/24  -Pain control as ordered  -PT/OT per Ortho  -Fall precaution   -Risk of postoperative delirium, needs close monitoring  -Resume PTA medications     Hypoxia, improving  Unable to wean off oxygen post operatively. Cough and upper airway congestion but normal lung exam. CXR with normal sized cardiac silhouette, linear fibrotic changes versus subsegmental atelectasis within the left mid to lower lung, visualized lungs are otherwise clear. Suspect atelectasis as etiology of hypoxia. May have URI/bronchitis leading to cough.  - Mucinex  - incentive spirometry  - Wean oxygen as able    Acute on chronic anemia   Baseline Hgb of 8. Post op Hgb 6.7 and 6.4 on repeat. EBL 100cc. Suspect dilutional.   Hgb 9.1 after 1U pRBC on 5/4.   - AM CBC    Chronic medical condition:  HTN: PTA Coreg 12.5 mg twice daily, Lisinopril 30 mg daily, Isordil 10 mg 3 times daily, clonidine 0.2 mg at bedtime and amlodipine 10 mg daily.  -Resume Coreg, amlodipine, Imdur today.  -Restart lisinopril and clonidine 05/05/24      Mild cognitive impairment:   -Has some memory issues, at risk of postop delirium.  -Close monitoring .  -Fall precautions     History of UTIs and nephrolithiasis:   -UA abnormal with large LE and WBC but no nitrites. No symptoms.  "Per son, hx of colonization and antibiotic related encephalopathy. Will hold off on antibiotics and monitor for symptoms.       GERD  History of GI bleed:   -Continue PPI as ordered.     MDD  Anxiety:   -Continue sertraline 100 mg daily.          Diet: Advance Diet as Tolerated: Regular Diet Adult    DVT Prophylaxis: Enoxaparin (Lovenox) SQ  Almanza Catheter: Not present  Lines: None     Cardiac Monitoring: None  Code Status: No CPR- Do NOT Intubate      Clinically Significant Risk Factors                  # Hypertension: Noted on problem list        # Cachexia: Estimated body mass index is 17.71 kg/m  as calculated from the following:    Height as of this encounter: 1.626 m (5' 4\").    Weight as of this encounter: 46.8 kg (103 lb 2.8 oz)., PRESENT ON ADMISSION     # Financial/Environmental Concerns: none         Disposition Plan     Medically Ready for Discharge: Anticipated Tomorrow             Jailene Dumont DO  Hospitalist Service  [facility]  Securely message with [employer] (more info)  Text page via [email or web address] Paging/Directory   ______________________________________________________________________    Interval History   No acute overnight events.  Doing well today. Feels weak but no other complaints.     Physical Exam   Vital Signs: Temp: 98.6  F (37  C) Temp src: Temporal BP: (!) 166/65 Pulse: 75   Resp: 18 SpO2: 94 % O2 Device: Nasal cannula Oxygen Delivery: 1 LPM  Weight: 103 lbs 2.8 oz    Constitutional: Awake, alert, no distress, and cooperative  Cardiovascular: Regular rate and rhythm, normal S1 and S2, no S3 or S4, and no murmur noted  Respiratory: No increased work of breathing, good air exchange, clear to auscultation bilaterally, no crackles or wheezing  Gastrointestinal: Abdomen soft, non-tender, non-distended. BS normal. No masses, organomegaly     Medical Decision Making       45 MINUTES SPENT BY ME on the date of service doing chart review, history, exam, documentation & " further activities per the note.      Data     I have personally reviewed the following data over the past 24 hrs:    12.7 (H)  \   9.1 (L)   / 176     N/A N/A N/A /  102 (H)   N/A N/A N/A \

## 2024-05-05 NOTE — PROGRESS NOTES
Ortho Daily Progress Note  Daya was resting in bed on exam.  She continues to elevate the leg.  Reports some ongoing leg pain.  History difficult to obtain from patient.    Temp:  [97.3  F (36.3  C)-98.6  F (37  C)] 97.3  F (36.3  C)  Pulse:  [66-83] 83  Resp:  [18-22] 18  BP: (128-166)/(43-65) 152/56  SpO2:  [89 %-97 %] 92 %  Hemoglobin   Date Value Ref Range Status   05/05/2024 9.1 (L) 11.7 - 15.7 g/dL Final   05/22/2021 9.2 (L) 11.7 - 15.7 g/dL Final   ]  Physical Exam  Alert, appropriate, and following commands  Breathing easily without wheeze    Left lower extremity:  Splint intact.  Ace wrap removed due to reported tightness  Leg was elevated  Sensations intact to light touch in the superficial peroneal, deep peroneal, tibial nerve distributions  FHL, EHL intact  Dorsalis pedis pulse 2+, good capillary refill      PACU radiographs reviewed.  Imaging reveals a left tibia and fibula nail.  Hardware appears in good position.  Splint material intact    A/P - 89 year old female who is postoperative day 2 status post a left distal tibia and fibula ORIF with intramedullary nails.  She is doing well postoperatively.  She does have some increased left lower leg pain.  The splint was loosened to determine if this gives her some pain relief.  Received 1 pRBC transufion on 5/4    1.  Ancef x 24 hours - completed  2.  Lovenox while in the hospital for DVT prophylaxis may transition to ASA 325mg on D/C  3.  PACU x-rays reviewed  4.  NWB in a short leg splint. Plan to remove splint at 2-3 weeks and place in a boot pending xrays. Will consider WBAT in a boot at 4-6 weeks pending xrays.   5.  Physical therapy/occupational therapy.   6.  OK to shower.  No submerging wound. Keep splint dry  7.  Discharge:  Case management social work consult for placement    Colin Evans MD

## 2024-05-05 NOTE — PLAN OF CARE
"Goal Outcome Evaluation:      Plan of Care Reviewed With: patient    Overall Patient Progress: no changeOverall Patient Progress: no change         Patient disoriented to time and situation. Intermittently to place. Patient denying pain overnight. Voiding via purewick. Incontinent of urine and stool. CMS in tact. Dressing CDI. No SOB or chest pain. VSS on 2 L oxymask. Patient took out IV on shift - pulling at everything. Okay to leave out until needed per Parpia. Discharge plans pending.                Problem: Adult Inpatient Plan of Care  Goal: Plan of Care Review  Description: The Plan of Care Review/Shift note should be completed every shift.  The Outcome Evaluation is a brief statement about your assessment that the patient is improving, declining, or no change.  This information will be displayed automatically on your shift  note.  Outcome: Progressing  Flowsheets (Taken 5/5/2024 0632)  Plan of Care Reviewed With: patient  Overall Patient Progress: no change  Goal: Patient-Specific Goal (Individualized)  Description: You can add care plan individualizations to a care plan. Examples of Individualization might be:  \"Parent requests to be called daily at 9am for status\", \"I have a hard time hearing out of my right ear\", or \"Do not touch me to wake me up as it startles  me\".  Outcome: Progressing  Goal: Absence of Hospital-Acquired Illness or Injury  Outcome: Progressing  Intervention: Identify and Manage Fall Risk  Recent Flowsheet Documentation  Taken 5/4/2024 2029 by Soco Becerra, RN  Safety Promotion/Fall Prevention:   activity supervised   assistive device/personal items within reach   clutter free environment maintained   patient and family education   room door open   safety round/check completed  Intervention: Prevent Skin Injury  Recent Flowsheet Documentation  Taken 5/4/2024 2029 by Soco Becerra, RN  Body Position: weight shifting  Skin Protection: adhesive use limited  Device Skin Pressure " Protection:   adhesive use limited   absorbent pad utilized/changed  Intervention: Prevent and Manage VTE (Venous Thromboembolism) Risk  Recent Flowsheet Documentation  Taken 5/4/2024 2029 by Soco Becerra RN  VTE Prevention/Management: SCDs (sequential compression devices) on  Intervention: Prevent Infection  Recent Flowsheet Documentation  Taken 5/4/2024 2029 by Soco Becerra RN  Infection Prevention:   rest/sleep promoted   single patient room provided  Goal: Optimal Comfort and Wellbeing  Outcome: Progressing  Goal: Readiness for Transition of Care  Outcome: Progressing     Problem: Pain Acute  Goal: Optimal Pain Control and Function  Outcome: Progressing  Intervention: Prevent or Manage Pain  Recent Flowsheet Documentation  Taken 5/4/2024 2029 by Soco Becerra RN  Sensory Stimulation Regulation:   care clustered   lighting decreased   quiet environment promoted  Bowel Elimination Promotion: adequate fluid intake promoted  Medication Review/Management: medications reviewed  Intervention: Optimize Psychosocial Wellbeing  Recent Flowsheet Documentation  Taken 5/4/2024 2029 by Soco Becerra RN  Supportive Measures: active listening utilized     Problem: Comorbidity Management  Goal: Blood Pressure in Desired Range  Outcome: Progressing  Intervention: Maintain Blood Pressure Management  Recent Flowsheet Documentation  Taken 5/4/2024 2029 by Soco Becerra RN  Medication Review/Management: medications reviewed     Problem: Orthopaedic Fracture  Goal: Absence of Bleeding  Outcome: Progressing  Intervention: Monitor and Manage Fracture Bleeding  Recent Flowsheet Documentation  Taken 5/4/2024 2029 by Soco Becerra RN  Bleeding Management:   affected area elevated   dressing monitored  Goal: Bowel Elimination  Outcome: Progressing  Intervention: Promote Effective Bowel Elimination  Recent Flowsheet Documentation  Taken 5/4/2024 2029 by Soco Becerra RN  Bowel Elimination Management: hygiene measures  promoted  Bowel Elimination Promotion: adequate fluid intake promoted  Goal: Absence of Embolism Signs and Symptoms  Outcome: Progressing  Intervention: Prevent or Manage Embolism Risk  Recent Flowsheet Documentation  Taken 5/4/2024 2029 by Soco Becerra RN  VTE Prevention/Management: SCDs (sequential compression devices) on  Goal: Fracture Stability  Outcome: Progressing  Goal: Optimal Functional Ability  Outcome: Progressing  Intervention: Optimize Functional Ability  Recent Flowsheet Documentation  Taken 5/4/2024 2029 by Soco Becerra RN  Activity Management: activity adjusted per tolerance  Positioning/Transfer Devices:   pillows   in use  Goal: Absence of Infection Signs and Symptoms  Outcome: Progressing  Intervention: Prevent or Manage Infection  Recent Flowsheet Documentation  Taken 5/4/2024 2029 by Soco Becerra RN  Infection Management: aseptic technique maintained  Isolation Precautions: contact precautions maintained  Goal: Effective Tissue Perfusion  Outcome: Progressing  Intervention: Prevent or Manage Neurovascular Compromise  Recent Flowsheet Documentation  Taken 5/4/2024 2029 by Soco Becerra RN  Neurovascular Pressure Management: Cast: extremity positioned at heart level  Compartment Syndrome Management: extremity placed at heart level  Goal: Optimal Pain Control and Function  Outcome: Progressing  Goal: Effective Oxygenation and Ventilation  Outcome: Progressing  Intervention: Promote Airway Secretion Clearance  Recent Flowsheet Documentation  Taken 5/4/2024 2029 by Soco Becerra RN  Breathing Techniques/Airway Clearance: deep/controlled cough encouraged  Cough And Deep Breathing: done independently per patient  Activity Management: activity adjusted per tolerance  Intervention: Optimize Oxygenation and Ventilation  Recent Flowsheet Documentation  Taken 5/4/2024 2029 by Soco Becerra RN  Head of Bed (HOB) Positioning: HOB at 30 degrees     Problem: Skin Injury Risk  Increased  Goal: Skin Health and Integrity  Outcome: Progressing  Intervention: Plan: Nurse Driven Intervention: Moisture Management  Recent Flowsheet Documentation  Taken 5/4/2024 2029 by Soco Becerra RN  Moisture Interventions: Urinary collection device  Intervention: Plan: Nurse Driven Intervention: Friction and Shear  Recent Flowsheet Documentation  Taken 5/4/2024 2029 by Soco Becerra RN  Friction/Shear Interventions: HOB 30 degrees or less  Intervention: Optimize Skin Protection  Recent Flowsheet Documentation  Taken 5/4/2024 2029 by Soco Becerra RN  Pressure Reduction Techniques:   frequent weight shift encouraged   heels elevated off bed  Skin Protection: adhesive use limited  Activity Management: activity adjusted per tolerance  Head of Bed (HOB) Positioning: HOB at 30 degrees  Intervention: Promote and Optimize Oral Intake  Recent Flowsheet Documentation  Taken 5/4/2024 2029 by Soco Becerra RN  Oral Nutrition Promotion: rest periods promoted

## 2024-05-06 ENCOUNTER — APPOINTMENT (OUTPATIENT)
Dept: OCCUPATIONAL THERAPY | Facility: CLINIC | Age: 89
DRG: 493 | End: 2024-05-06
Payer: COMMERCIAL

## 2024-05-06 ENCOUNTER — APPOINTMENT (OUTPATIENT)
Dept: PHYSICAL THERAPY | Facility: CLINIC | Age: 89
DRG: 493 | End: 2024-05-06
Payer: COMMERCIAL

## 2024-05-06 LAB
CREAT SERPL-MCNC: 0.51 MG/DL (ref 0.51–0.95)
EGFRCR SERPLBLD CKD-EPI 2021: 87 ML/MIN/1.73M2
HGB BLD-MCNC: 7.8 G/DL (ref 11.7–15.7)
PLATELET # BLD AUTO: 145 10E3/UL (ref 150–450)

## 2024-05-06 PROCEDURE — 99232 SBSQ HOSP IP/OBS MODERATE 35: CPT | Performed by: STUDENT IN AN ORGANIZED HEALTH CARE EDUCATION/TRAINING PROGRAM

## 2024-05-06 PROCEDURE — 94640 AIRWAY INHALATION TREATMENT: CPT

## 2024-05-06 PROCEDURE — 120N000001 HC R&B MED SURG/OB

## 2024-05-06 PROCEDURE — 36415 COLL VENOUS BLD VENIPUNCTURE: CPT

## 2024-05-06 PROCEDURE — 250N000013 HC RX MED GY IP 250 OP 250 PS 637: Performed by: INTERNAL MEDICINE

## 2024-05-06 PROCEDURE — 999N000157 HC STATISTIC RCP TIME EA 10 MIN

## 2024-05-06 PROCEDURE — 97530 THERAPEUTIC ACTIVITIES: CPT | Mod: GP | Performed by: PHYSICAL THERAPIST

## 2024-05-06 PROCEDURE — 250N000013 HC RX MED GY IP 250 OP 250 PS 637: Performed by: STUDENT IN AN ORGANIZED HEALTH CARE EDUCATION/TRAINING PROGRAM

## 2024-05-06 PROCEDURE — 82565 ASSAY OF CREATININE: CPT

## 2024-05-06 PROCEDURE — 97530 THERAPEUTIC ACTIVITIES: CPT | Mod: GO

## 2024-05-06 PROCEDURE — 97110 THERAPEUTIC EXERCISES: CPT | Mod: GP | Performed by: PHYSICAL THERAPIST

## 2024-05-06 PROCEDURE — 250N000013 HC RX MED GY IP 250 OP 250 PS 637

## 2024-05-06 PROCEDURE — 85018 HEMOGLOBIN: CPT | Performed by: STUDENT IN AN ORGANIZED HEALTH CARE EDUCATION/TRAINING PROGRAM

## 2024-05-06 PROCEDURE — 85049 AUTOMATED PLATELET COUNT: CPT

## 2024-05-06 PROCEDURE — 250N000009 HC RX 250: Performed by: STUDENT IN AN ORGANIZED HEALTH CARE EDUCATION/TRAINING PROGRAM

## 2024-05-06 RX ORDER — ALBUTEROL SULFATE 0.83 MG/ML
2.5 SOLUTION RESPIRATORY (INHALATION)
Status: DISCONTINUED | OUTPATIENT
Start: 2024-05-06 | End: 2024-05-08 | Stop reason: HOSPADM

## 2024-05-06 RX ORDER — CODEINE PHOSPHATE AND GUAIFENESIN 10; 100 MG/5ML; MG/5ML
5 SOLUTION ORAL EVERY 4 HOURS PRN
Status: DISCONTINUED | OUTPATIENT
Start: 2024-05-06 | End: 2024-05-08 | Stop reason: HOSPADM

## 2024-05-06 RX ORDER — BENZONATATE 100 MG/1
100 CAPSULE ORAL 3 TIMES DAILY PRN
Status: DISCONTINUED | OUTPATIENT
Start: 2024-05-06 | End: 2024-05-08 | Stop reason: HOSPADM

## 2024-05-06 RX ORDER — ASPIRIN 325 MG
325 TABLET, DELAYED RELEASE (ENTERIC COATED) ORAL DAILY
Status: DISCONTINUED | OUTPATIENT
Start: 2024-05-06 | End: 2024-05-08 | Stop reason: HOSPADM

## 2024-05-06 RX ADMIN — GUAIFENESIN AND CODEINE PHOSPHATE 5 ML: 100; 10 SOLUTION ORAL at 11:20

## 2024-05-06 RX ADMIN — ALBUTEROL SULFATE 2.5 MG: 2.5 SOLUTION RESPIRATORY (INHALATION) at 11:45

## 2024-05-06 RX ADMIN — CARBAMIDE PEROXIDE 5 DROP: KIT at 12:25

## 2024-05-06 RX ADMIN — ACETAMINOPHEN 975 MG: 325 TABLET, FILM COATED ORAL at 10:43

## 2024-05-06 RX ADMIN — FERROUS GLUCONATE 324 MG: 324 TABLET ORAL at 10:43

## 2024-05-06 RX ADMIN — AMLODIPINE BESYLATE 10 MG: 10 TABLET ORAL at 10:42

## 2024-05-06 RX ADMIN — PANTOPRAZOLE SODIUM 40 MG: 40 TABLET, DELAYED RELEASE ORAL at 10:43

## 2024-05-06 RX ADMIN — ASPIRIN 325 MG: 325 TABLET ORAL at 10:42

## 2024-05-06 RX ADMIN — ACETAMINOPHEN 650 MG: 325 TABLET, FILM COATED ORAL at 17:36

## 2024-05-06 RX ADMIN — GUAIFENESIN 600 MG: 600 TABLET, EXTENDED RELEASE ORAL at 20:30

## 2024-05-06 RX ADMIN — OXYCODONE HYDROCHLORIDE 2.5 MG: 5 TABLET ORAL at 20:56

## 2024-05-06 RX ADMIN — CARVEDILOL 12.5 MG: 12.5 TABLET, FILM COATED ORAL at 10:43

## 2024-05-06 RX ADMIN — ISOSORBIDE DINITRATE 10 MG: 10 TABLET ORAL at 20:30

## 2024-05-06 RX ADMIN — CARBAMIDE PEROXIDE 5 DROP: KIT at 20:32

## 2024-05-06 RX ADMIN — CARVEDILOL 12.5 MG: 12.5 TABLET, FILM COATED ORAL at 17:36

## 2024-05-06 RX ADMIN — GUAIFENESIN 600 MG: 600 TABLET, EXTENDED RELEASE ORAL at 10:43

## 2024-05-06 RX ADMIN — CLONIDINE HYDROCHLORIDE 0.2 MG: 0.1 TABLET ORAL at 22:48

## 2024-05-06 RX ADMIN — LISINOPRIL 30 MG: 20 TABLET ORAL at 10:42

## 2024-05-06 RX ADMIN — ISOSORBIDE DINITRATE 10 MG: 10 TABLET ORAL at 10:43

## 2024-05-06 RX ADMIN — ISOSORBIDE DINITRATE 10 MG: 10 TABLET ORAL at 16:43

## 2024-05-06 RX ADMIN — SERTRALINE HYDROCHLORIDE 100 MG: 100 TABLET ORAL at 10:43

## 2024-05-06 ASSESSMENT — ACTIVITIES OF DAILY LIVING (ADL)
ADLS_ACUITY_SCORE: 53
ADLS_ACUITY_SCORE: 49
ADLS_ACUITY_SCORE: 53
ADLS_ACUITY_SCORE: 49
ADLS_ACUITY_SCORE: 53
ADLS_ACUITY_SCORE: 49
ADLS_ACUITY_SCORE: 53
ADLS_ACUITY_SCORE: 53
ADLS_ACUITY_SCORE: 49
ADLS_ACUITY_SCORE: 53
ADLS_ACUITY_SCORE: 53

## 2024-05-06 NOTE — PROGRESS NOTES
[facility]    Medicine Progress Note - Hospitalist Service    Date of Admission:  5/1/2024    Assessment & Plan   Summary: Daya Ignacio is a 89 year old female with PMHx of HTN, MDD, anxiety, mild cognitive impairment, anemia, chronic back pain from compression fractures, GIB, nephrolithiasis, C diff, and falls with previous fractures of R tib/fib, pubic rami, and hip, who was admitted on 5/1/2024 with unwitnessed fall at [facility] and found to have comminuted L distal fibula shaft fractures.      Unwitnessed fall  Acute comminuted left distal tibia and distal fibula/malleolar fractures s/p ORIF 5/3/24  Chronic back pain  History spinal compression fractures  -Status post ORIF 5/3/24  -Pain control as ordered  -PT/OT per Ortho  -Fall precaution   -Risk of postoperative delirium, needs close monitoring  -Resume PTA medications     Hypoxia, improving  Unable to wean off oxygen post operatively. Cough and upper airway congestion but normal lung exam. CXR with normal sized cardiac silhouette, linear fibrotic changes versus subsegmental atelectasis within the left mid to lower lung, visualized lungs are otherwise clear. Suspect atelectasis as etiology of hypoxia. May have URI/bronchitis leading to cough.  - Mucinex, robitussin, tessalon pearls  - PRN albuterol neb  - incentive spirometry, has trouble understanding how to use it  - Wean oxygen as able, may need oxygen on discharge to TCU    Acute on chronic anemia   Baseline Hgb of 8. Post op Hgb 6.7 and 6.4 on repeat. EBL 100cc. Suspect dilutional.   Hgb 9.1 after 1U pRBC on 5/4. Suspect this was falsely high, repeat Hgb 7.8 on 5/6. No s/s of bleeding.  -AM Hgb    Chronic medical condition:  HTN: PTA Coreg 12.5 mg twice daily, Lisinopril 30 mg daily, Isordil 10 mg 3 times daily, clonidine 0.2 mg at bedtime and amlodipine 10 mg daily.  -Continue Coreg, amlodipine, Imdur, lisinopril and clonidine      Mild cognitive impairment:   -Has some memory issues,  "at risk of postop delirium.  -Close monitoring .  -Fall precautions     History of UTIs and nephrolithiasis:   -UA abnormal with large LE and WBC but no nitrites. Culture with mixed urogenital jorge. No symptoms. 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 Will hold off on antibiotics and monitor for symptoms.       GERD  History of GI bleed:   -Continue PPI as ordered.     MDD  Anxiety:   -Continue sertraline 100 mg daily.          Diet: Advance Diet as Tolerated: Regular Diet Adult    DVT Prophylaxis: Enoxaparin (Lovenox) SQ  Almanza Catheter: Not present  Lines: None     Cardiac Monitoring: None  Code Status: No CPR- Do NOT Intubate      Clinically Significant Risk Factors                  # Hypertension: Noted on problem list        # Cachexia: Estimated body mass index is 17.71 kg/m  as calculated from the following:    Height as of this encounter: 1.626 m (5' 4\").    Weight as of this encounter: 46.8 kg (103 lb 2.8 oz).      # Financial/Environmental Concerns: none         Disposition Plan     Medically Ready for Discharge: Anticipated Tomorrow pending stable Hgb and respiratory status             Jailene Dumont DO  Hospitalist Service  [facility]  Securely message with [employer] (more info)  Text page via [facility] Paging/Directory   ______________________________________________________________________    Interval History   No acute overnight events. Grumpy this morning, yelled at staff to leave when attempting vitals check. Ongoing cough that is bothering patient. Will add PRN meds to help. No other acute concerns.     Physical Exam   Vital Signs: Temp: 98.2  F (36.8  C) Temp src: Temporal BP: (!) 155/63 Pulse: 73   Resp: 18 SpO2: 96 % O2 Device: Oxymask Oxygen Delivery: 3 LPM  Weight: 103 lbs 2.8 oz    Constitutional: Awake, alert, no distress, and cooperative  Cardiovascular: Regular rate and rhythm, normal S1 and S2, no S3 or S4, and no murmur " noted  Respiratory: No increased work of breathing, good air exchange, clear to auscultation bilaterally, no crackles or wheezing  Gastrointestinal: Abdomen soft, non-tender, non-distended. BS normal. No masses, organomegaly     Medical Decision Making       45 MINUTES SPENT BY ME on the date of service doing chart review, history, exam, documentation & further activities per the note.      Data     I have personally reviewed the following data over the past 24 hrs:    N/A  \   7.8 (L)   / 145 (L)     N/A N/A N/A /  N/A   N/A N/A 0.51 \

## 2024-05-06 NOTE — PLAN OF CARE
Goal Outcome Evaluation:    Patient A&O x4, intermittent confusion, easily reoriented   VSS throughout shift, hypertension managed with carvedilol & isosorbide   Increased 02 to 3 LPM via NC, saturations now in mid/upper 90's, continuous pulse ox in use   Regular diet maintained   CMS intact, dressing CDI   Cough managed with Mucinex   Contact precautions maintained   Voiding ok, purewick in place   Pain managed with Tylenol   Confusion on orders for Aspirin, patient has been refusing Lovenox injections, Aspirin order placed, pharmacy refuses to fill d/t progress not. Messaged provider, read with no response.         Plan of Care Reviewed With: patient    Overall Patient Progress: improvingOverall Patient Progress: improving

## 2024-05-06 NOTE — PROGRESS NOTES
Orthopedic Surgery  Caitlin Sandee  05/06/2024     Admit Date:  5/1/2024    POD: 3 Days Post-Op   Procedure(s):  Left Mathias C lateral malleolus fracture open reduction and internal fixation with retrograde fibular nail  Left tibia shaft open reduction and internal fixation with intramedullary nail     Patient resting comfortably in bed. Intermittently sleeping. Pain within expectations. Continues to elevate leg. No nausea or vomiting. 2 L oxymask.     Temp:  [97.3  F (36.3  C)-98.2  F (36.8  C)] 97.6  F (36.4  C)  Pulse:  [70-84] 71  Resp:  [18-24] 24  BP: (142-164)/(55-70) 161/56  SpO2:  [92 %-99 %] 96 %    Alert, appropriate, and following commands  Breathing easily without wheeze     Left lower extremity:  Splint intact.    Leg was elevated  Sensations intact to light touch in the superficial peroneal, deep peroneal, tibial nerve distributions  FHL, EHL intact  Dorsalis pedis pulse 2+, good capillary refill      Labs:  Recent Labs   Lab Test 05/06/24  0703 05/05/24  0727 05/04/24  1233 05/03/24  0716 05/02/24  0717 05/01/24  2219   WBC  --  12.7*  --   --  8.6 9.5   HGB 7.8* 9.1* 6.4*   < > 9.1* 8.2*   * 176  --   --  194 202    < > = values in this interval not displayed.     Recent Labs   Lab Test 05/02/24  0717 05/06/20  0928 08/28/19  0850   INR 1.12 1.13 1.18*     Recent Labs   Lab Test 01/31/23  0728 01/30/23  0748 01/29/23  0724   CRPI 13.74* 22.19* 44.64*         1. PLAN:   Lovenox while in the hospital for DVT prophylaxis may transition to ASA 325mg on D/C    Mobilize with PT/OT    NWB in a short leg splint.    Continue current pain regimen   Dressings: Keep intact.  Change if >60% saturated or peeling off.    Follow-up: 2 weeks post-op with Dr. Evans/ Chuyita De Luna PA-C    2. Disposition   Anticipate d/c to TCU when medically cleared and progressing in PT.    Juliann Bergeron PA-C

## 2024-05-06 NOTE — PROGRESS NOTES
Care Management Follow Up    Length of Stay (days): 4    Expected Discharge Date: 05/07/2024     Concerns to be Addressed: discharge planning     Patient plan of care discussed at interdisciplinary rounds: Yes    Anticipated Discharge Disposition: Transitional Care     Anticipated Discharge Services:    Anticipated Discharge DME:      Patient/family educated on Medicare website which has current facility and service quality ratings: yes  Education Provided on the Discharge Plan:    Patient/Family in Agreement with the Plan: yes    Referrals Placed by CM/SW: Post Acute Facilities, Transportation  Private pay costs discussed: Not applicable    Additional Information:  Patient and son updated that no TCU's have responded yet for acceptance. CM will monitor for patient medically ready and TCU acceptance.    Erinn Franco, RN, BSN, CM  Inpatient Care Coordination  [facility]  [phone]

## 2024-05-06 NOTE — PLAN OF CARE
"Goal Outcome Evaluation:    Patient A&O x4, intermittent confusion, easily reoriented   VSS throughout shift, hypertension managed with carvedilol, isosorbide, catapres   Increased 02 to 3 LPM via NC, saturations now in mid/upper 90's, continuous pulse ox in use. Switched to oxymask at bed.   Regular diet maintained   CMS intact, dressing CDI   Cough managed with Mucinex   Contact precautions maintained   Voiding ok, purewick in place   Pain managed with Tylenol   Confusion on orders for Aspirin, patient has been refusing Lovenox injections, Aspirin order placed, pharmacy can't fill d/t progress note. Messaged provider, read with no response.   Problem: Adult Inpatient Plan of Care  Goal: Plan of Care Review  Description: The Plan of Care Review/Shift note should be completed every shift.  The Outcome Evaluation is a brief statement about your assessment that the patient is improving, declining, or no change.  This information will be displayed automatically on your shift  note.  5/5/2024 2150 by Florecita Larios, RN  Outcome: Progressing  Flowsheets (Taken 5/5/2024 2150)  Plan of Care Reviewed With: patient  Overall Patient Progress: improving  5/5/2024 2011 by Florecita Larios, RN  Outcome: Progressing  Flowsheets (Taken 5/5/2024 2011)  Plan of Care Reviewed With: patient  Overall Patient Progress: improving  Goal: Patient-Specific Goal (Individualized)  Description: You can add care plan individualizations to a care plan. Examples of Individualization might be:  \"Parent requests to be called daily at 9am for status\", \"I have a hard time hearing out of my right ear\", or \"Do not touch me to wake me up as it startles  me\".  5/5/2024 2150 by Florecita Larios, RN  Outcome: Progressing  5/5/2024 2011 by Florecita Larios, RN  Outcome: Progressing  Goal: Absence of Hospital-Acquired Illness or Injury  5/5/2024 2150 by Florecita Larios, RN  Outcome: Progressing  5/5/2024 2011 by Florecita Larios, RN  Outcome: " Progressing  Intervention: Identify and Manage Fall Risk  Recent Flowsheet Documentation  Taken 5/5/2024 1642 by Florecita Larios RN  Safety Promotion/Fall Prevention: activity supervised  Intervention: Prevent Skin Injury  Recent Flowsheet Documentation  Taken 5/5/2024 1642 by Florecita Larios RN  Skin Protection: adhesive use limited  Device Skin Pressure Protection: absorbent pad utilized/changed  Taken 5/5/2024 1638 by Florecita Larios RN  Body Position: supine  Intervention: Prevent and Manage VTE (Venous Thromboembolism) Risk  Recent Flowsheet Documentation  Taken 5/5/2024 1642 by Florecita Larios RN  VTE Prevention/Management: SCDs (sequential compression devices) on  Intervention: Prevent Infection  Recent Flowsheet Documentation  Taken 5/5/2024 1642 by Florecita Larios RN  Infection Prevention: hand hygiene promoted  Goal: Optimal Comfort and Wellbeing  5/5/2024 2150 by Florecita Larios RN  Outcome: Progressing  5/5/2024 2011 by Florecita Larios RN  Outcome: Progressing  Intervention: Monitor Pain and Promote Comfort  Recent Flowsheet Documentation  Taken 5/5/2024 1638 by Florecita Larios RN  Pain Management Interventions:   medication (see MAR)   care clustered  Goal: Readiness for Transition of Care  5/5/2024 2150 by Florecita Larios RN  Outcome: Progressing  5/5/2024 2011 by Florecita Larios RN  Outcome: Progressing         Plan of Care Reviewed With: patient    Overall Patient Progress: improvingOverall Patient Progress: improving

## 2024-05-06 NOTE — PLAN OF CARE
"Goal Outcome Evaluation:      Plan of Care Reviewed With: patient    Overall Patient Progress: no changeOverall Patient Progress: no change                 Patient slept through night. When awoken, patient yelled at staff to leave. Re-attempted several times, patient still wanting to sleep. CMS in tact from Cap refill/wiggles/sensation. Congested. VSS on 2 L oxymask. Pending plans.              Problem: Adult Inpatient Plan of Care  Goal: Plan of Care Review  Description: The Plan of Care Review/Shift note should be completed every shift.  The Outcome Evaluation is a brief statement about your assessment that the patient is improving, declining, or no change.  This information will be displayed automatically on your shift  note.  Outcome: Progressing  Flowsheets (Taken 5/6/2024 0705)  Plan of Care Reviewed With: patient  Overall Patient Progress: no change  Goal: Patient-Specific Goal (Individualized)  Description: You can add care plan individualizations to a care plan. Examples of Individualization might be:  \"Parent requests to be called daily at 9am for status\", \"I have a hard time hearing out of my right ear\", or \"Do not touch me to wake me up as it startles  me\".  Outcome: Progressing  Goal: Absence of Hospital-Acquired Illness or Injury  Outcome: Progressing  Intervention: Identify and Manage Fall Risk  Recent Flowsheet Documentation  Taken 5/6/2024 0400 by Soco Becerra RN  Safety Promotion/Fall Prevention: safety round/check completed  Taken 5/6/2024 0300 by Soco Becerra RN  Safety Promotion/Fall Prevention: safety round/check completed  Taken 5/6/2024 0100 by Soco Becerra RN  Safety Promotion/Fall Prevention: safety round/check completed  Taken 5/5/2024 2330 by Soco Becerra, RN  Safety Promotion/Fall Prevention: safety round/check completed  Intervention: Prevent Skin Injury  Recent Flowsheet Documentation  Taken 5/6/2024 0400 by Soco Becerra RN  Body Position:   supine, legs elevated   " supine, head elevated  Skin Protection: adhesive use limited  Device Skin Pressure Protection: absorbent pad utilized/changed  Intervention: Prevent and Manage VTE (Venous Thromboembolism) Risk  Recent Flowsheet Documentation  Taken 5/6/2024 0400 by Soco Becerra RN  VTE Prevention/Management: SCDs (sequential compression devices) on  Intervention: Prevent Infection  Recent Flowsheet Documentation  Taken 5/6/2024 0400 by Soco Becerra RN  Infection Prevention:   rest/sleep promoted   single patient room provided  Taken 5/6/2024 0100 by Soco Becerra RN  Infection Prevention:   hand hygiene promoted   rest/sleep promoted   single patient room provided  Goal: Optimal Comfort and Wellbeing  Outcome: Progressing  Goal: Readiness for Transition of Care  Outcome: Progressing     Problem: Pain Acute  Goal: Optimal Pain Control and Function  Outcome: Progressing  Intervention: Prevent or Manage Pain  Recent Flowsheet Documentation  Taken 5/6/2024 0400 by Soco Becerra RN  Sensory Stimulation Regulation:   care clustered   lighting decreased  Medication Review/Management: medications reviewed  Intervention: Optimize Psychosocial Wellbeing  Recent Flowsheet Documentation  Taken 5/6/2024 0400 by Soco Becerra RN  Supportive Measures: active listening utilized     Problem: Comorbidity Management  Goal: Blood Pressure in Desired Range  Outcome: Progressing  Intervention: Maintain Blood Pressure Management  Recent Flowsheet Documentation  Taken 5/6/2024 0400 by Soco Becerra RN  Medication Review/Management: medications reviewed     Problem: Orthopaedic Fracture  Goal: Absence of Bleeding  Outcome: Progressing  Intervention: Monitor and Manage Fracture Bleeding  Recent Flowsheet Documentation  Taken 5/6/2024 0400 by Soco Becerra RN  Bleeding Management:   affected area elevated   dressing monitored  Goal: Bowel Elimination  Outcome: Progressing  Goal: Absence of Embolism Signs and Symptoms  Outcome:  Progressing  Intervention: Prevent or Manage Embolism Risk  Recent Flowsheet Documentation  Taken 5/6/2024 0400 by Soco Becerra RN  VTE Prevention/Management: SCDs (sequential compression devices) on  Goal: Fracture Stability  Outcome: Progressing  Goal: Optimal Functional Ability  Outcome: Progressing  Intervention: Optimize Functional Ability  Recent Flowsheet Documentation  Taken 5/6/2024 0400 by Soco Becerra RN  Activity Management: activity adjusted per tolerance  Positioning/Transfer Devices:   pillows   in use  Goal: Absence of Infection Signs and Symptoms  Outcome: Progressing  Intervention: Prevent or Manage Infection  Recent Flowsheet Documentation  Taken 5/6/2024 0400 by Soco Becerra RN  Infection Management: aseptic technique maintained  Isolation Precautions: contact precautions maintained  Taken 5/6/2024 0100 by Soco Becerra RN  Isolation Precautions: contact precautions maintained  Goal: Effective Tissue Perfusion  Outcome: Progressing  Goal: Optimal Pain Control and Function  Outcome: Progressing  Goal: Effective Oxygenation and Ventilation  Outcome: Progressing  Intervention: Promote Airway Secretion Clearance  Recent Flowsheet Documentation  Taken 5/6/2024 0400 by Soco Becerra RN  Breathing Techniques/Airway Clearance: deep/controlled cough encouraged  Activity Management: activity adjusted per tolerance  Intervention: Optimize Oxygenation and Ventilation  Recent Flowsheet Documentation  Taken 5/6/2024 0400 by Soco Becerra RN  Head of Bed (HOB) Positioning: HOB at 20-30 degrees     Problem: Skin Injury Risk Increased  Goal: Skin Health and Integrity  Outcome: Progressing  Intervention: Plan: Nurse Driven Intervention: Moisture Management  Recent Flowsheet Documentation  Taken 5/6/2024 0400 by Soco Becerra RN  Moisture Interventions: Urinary collection device  Intervention: Plan: Nurse Driven Intervention: Friction and Shear  Recent Flowsheet Documentation  Taken 5/6/2024  0400 by Soco Becerra, RN  Friction/Shear Interventions: HOB 30 degrees or less  Intervention: Optimize Skin Protection  Recent Flowsheet Documentation  Taken 5/6/2024 0400 by Soco Becerra, RN  Pressure Reduction Techniques: heels elevated off bed  Pressure Reduction Devices: heel offloading device utilized  Skin Protection: adhesive use limited  Activity Management: activity adjusted per tolerance  Head of Bed (HOB) Positioning: HOB at 20-30 degrees

## 2024-05-06 NOTE — PLAN OF CARE
Goal Outcome Evaluation:      Plan of Care Reviewed With: patient    Overall Patient Progress: no changeOverall Patient Progress: no change    Outcome Evaluation: Pt with intermittent confusion. Sleepy this morning, but arouses easily-became more alert/cooperative as the day progressed. Splint to LLE intact, LLE elevated on pillows. Remains on 2L of O2. Still having congested cough-PRN Robitussin given along with PRN Neb tx as upper lobes coarse/wheezy. Voiding per purewick. Tylenol for pain. Will need TCU for placement.

## 2024-05-07 ENCOUNTER — APPOINTMENT (OUTPATIENT)
Dept: OCCUPATIONAL THERAPY | Facility: CLINIC | Age: 89
DRG: 493 | End: 2024-05-07
Payer: COMMERCIAL

## 2024-05-07 ENCOUNTER — APPOINTMENT (OUTPATIENT)
Dept: PHYSICAL THERAPY | Facility: CLINIC | Age: 89
DRG: 493 | End: 2024-05-07
Payer: COMMERCIAL

## 2024-05-07 ENCOUNTER — APPOINTMENT (OUTPATIENT)
Dept: SPEECH THERAPY | Facility: CLINIC | Age: 89
DRG: 493 | End: 2024-05-07
Attending: STUDENT IN AN ORGANIZED HEALTH CARE EDUCATION/TRAINING PROGRAM
Payer: COMMERCIAL

## 2024-05-07 LAB — HGB BLD-MCNC: 7.8 G/DL (ref 11.7–15.7)

## 2024-05-07 PROCEDURE — 250N000013 HC RX MED GY IP 250 OP 250 PS 637: Performed by: INTERNAL MEDICINE

## 2024-05-07 PROCEDURE — 250N000013 HC RX MED GY IP 250 OP 250 PS 637: Performed by: STUDENT IN AN ORGANIZED HEALTH CARE EDUCATION/TRAINING PROGRAM

## 2024-05-07 PROCEDURE — 99232 SBSQ HOSP IP/OBS MODERATE 35: CPT | Performed by: STUDENT IN AN ORGANIZED HEALTH CARE EDUCATION/TRAINING PROGRAM

## 2024-05-07 PROCEDURE — 97110 THERAPEUTIC EXERCISES: CPT | Mod: GO

## 2024-05-07 PROCEDURE — 36415 COLL VENOUS BLD VENIPUNCTURE: CPT | Performed by: STUDENT IN AN ORGANIZED HEALTH CARE EDUCATION/TRAINING PROGRAM

## 2024-05-07 PROCEDURE — 92610 EVALUATE SWALLOWING FUNCTION: CPT | Mod: GN | Performed by: SPEECH-LANGUAGE PATHOLOGIST

## 2024-05-07 PROCEDURE — 97110 THERAPEUTIC EXERCISES: CPT | Mod: GP | Performed by: PHYSICAL THERAPIST

## 2024-05-07 PROCEDURE — 250N000013 HC RX MED GY IP 250 OP 250 PS 637

## 2024-05-07 PROCEDURE — 250N000011 HC RX IP 250 OP 636

## 2024-05-07 PROCEDURE — 85018 HEMOGLOBIN: CPT | Performed by: STUDENT IN AN ORGANIZED HEALTH CARE EDUCATION/TRAINING PROGRAM

## 2024-05-07 PROCEDURE — 120N000001 HC R&B MED SURG/OB

## 2024-05-07 PROCEDURE — 97530 THERAPEUTIC ACTIVITIES: CPT | Mod: GP | Performed by: PHYSICAL THERAPIST

## 2024-05-07 RX ORDER — GUAIFENESIN 600 MG/1
600 TABLET, EXTENDED RELEASE ORAL 2 TIMES DAILY
DISCHARGE
Start: 2024-05-07 | End: 2024-06-19

## 2024-05-07 RX ORDER — CLONIDINE HYDROCHLORIDE 0.1 MG/1
0.1 TABLET ORAL AT BEDTIME
Status: DISCONTINUED | OUTPATIENT
Start: 2024-05-07 | End: 2024-05-08 | Stop reason: HOSPADM

## 2024-05-07 RX ORDER — BENZONATATE 100 MG/1
100 CAPSULE ORAL 3 TIMES DAILY PRN
DISCHARGE
Start: 2024-05-07 | End: 2024-05-09

## 2024-05-07 RX ADMIN — AMLODIPINE BESYLATE 10 MG: 10 TABLET ORAL at 08:40

## 2024-05-07 RX ADMIN — ACETAMINOPHEN 650 MG: 325 TABLET, FILM COATED ORAL at 20:27

## 2024-05-07 RX ADMIN — HYDROXYZINE HYDROCHLORIDE 10 MG: 10 TABLET ORAL at 20:27

## 2024-05-07 RX ADMIN — CARVEDILOL 12.5 MG: 12.5 TABLET, FILM COATED ORAL at 08:40

## 2024-05-07 RX ADMIN — LISINOPRIL 30 MG: 20 TABLET ORAL at 08:40

## 2024-05-07 RX ADMIN — CARBAMIDE PEROXIDE 5 DROP: KIT at 20:20

## 2024-05-07 RX ADMIN — PANTOPRAZOLE SODIUM 40 MG: 40 TABLET, DELAYED RELEASE ORAL at 08:40

## 2024-05-07 RX ADMIN — GUAIFENESIN 600 MG: 600 TABLET, EXTENDED RELEASE ORAL at 20:19

## 2024-05-07 RX ADMIN — ONDANSETRON 4 MG: 4 TABLET, ORALLY DISINTEGRATING ORAL at 17:40

## 2024-05-07 RX ADMIN — CARVEDILOL 12.5 MG: 12.5 TABLET, FILM COATED ORAL at 18:16

## 2024-05-07 RX ADMIN — ISOSORBIDE DINITRATE 10 MG: 10 TABLET ORAL at 08:40

## 2024-05-07 RX ADMIN — ISOSORBIDE DINITRATE 10 MG: 10 TABLET ORAL at 20:19

## 2024-05-07 RX ADMIN — GUAIFENESIN 600 MG: 600 TABLET, EXTENDED RELEASE ORAL at 08:40

## 2024-05-07 RX ADMIN — POLYETHYLENE GLYCOL 3350 17 G: 17 POWDER, FOR SOLUTION ORAL at 08:45

## 2024-05-07 RX ADMIN — CARBAMIDE PEROXIDE 5 DROP: KIT at 08:47

## 2024-05-07 RX ADMIN — OXYCODONE HYDROCHLORIDE 5 MG: 5 TABLET ORAL at 18:16

## 2024-05-07 RX ADMIN — ISOSORBIDE DINITRATE 10 MG: 10 TABLET ORAL at 15:37

## 2024-05-07 RX ADMIN — ACETAMINOPHEN 650 MG: 325 TABLET, FILM COATED ORAL at 08:42

## 2024-05-07 RX ADMIN — CLONIDINE HYDROCHLORIDE 0.1 MG: 0.1 TABLET ORAL at 22:20

## 2024-05-07 RX ADMIN — SENNOSIDES AND DOCUSATE SODIUM 1 TABLET: 50; 8.6 TABLET ORAL at 08:40

## 2024-05-07 RX ADMIN — OXYCODONE HYDROCHLORIDE 5 MG: 5 TABLET ORAL at 22:29

## 2024-05-07 RX ADMIN — ACETAMINOPHEN 650 MG: 325 TABLET, FILM COATED ORAL at 15:37

## 2024-05-07 RX ADMIN — OXYCODONE HYDROCHLORIDE 5 MG: 5 TABLET ORAL at 08:44

## 2024-05-07 RX ADMIN — ASPIRIN 325 MG: 325 TABLET ORAL at 08:40

## 2024-05-07 RX ADMIN — FERROUS GLUCONATE 324 MG: 324 TABLET ORAL at 08:40

## 2024-05-07 RX ADMIN — SERTRALINE HYDROCHLORIDE 100 MG: 100 TABLET ORAL at 08:40

## 2024-05-07 ASSESSMENT — ACTIVITIES OF DAILY LIVING (ADL)
ADLS_ACUITY_SCORE: 46
ADLS_ACUITY_SCORE: 51
ADLS_ACUITY_SCORE: 47
ADLS_ACUITY_SCORE: 49
ADLS_ACUITY_SCORE: 50
ADLS_ACUITY_SCORE: 49
ADLS_ACUITY_SCORE: 46
ADLS_ACUITY_SCORE: 49
ADLS_ACUITY_SCORE: 46
ADLS_ACUITY_SCORE: 46
ADLS_ACUITY_SCORE: 49
ADLS_ACUITY_SCORE: 46
ADLS_ACUITY_SCORE: 49
ADLS_ACUITY_SCORE: 52
ADLS_ACUITY_SCORE: 46
ADLS_ACUITY_SCORE: 47
ADLS_ACUITY_SCORE: 50
ADLS_ACUITY_SCORE: 49
ADLS_ACUITY_SCORE: 49

## 2024-05-07 NOTE — PROGRESS NOTES
Orthopedic Surgery  Caitlin Sandee  05/07/2024     Admit Date:  5/1/2024    POD: 4 Days Post-Op   Procedure(s):  Left Mathias C lateral malleolus fracture open reduction and internal fixation with retrograde fibular nail  Left tibia shaft open reduction and internal fixation with intramedullary nail     Patient resting comfortably in bed. Alert, hard of hearing. Pain within expectations. Continues to elevate leg. No nausea or vomiting. 2 L oxygen via nasal cannula      Temp:  [98  F (36.7  C)-98.9  F (37.2  C)] 98.5  F (36.9  C)  Pulse:  [61-82] 69  Resp:  [18-24] 20  BP: (124-159)/(47-58) 159/54  SpO2:  [95 %-98 %] 97 %    Alert, appropriate, and following commands  Breathing easily without wheeze     Left lower extremity:  Splint intact.    Leg was elevated  Sensations intact to light touch in the superficial peroneal, deep peroneal, tibial nerve distributions  FHL, EHL intact  Dorsalis pedis pulse 2+, good capillary refill      Labs:  Recent Labs   Lab Test 05/07/24  0617 05/06/24  0703 05/05/24  0727 05/03/24  0716 05/02/24  0717 05/01/24  2219   WBC  --   --  12.7*  --  8.6 9.5   HGB 7.8* 7.8* 9.1*   < > 9.1* 8.2*   PLT  --  145* 176  --  194 202    < > = values in this interval not displayed.     Recent Labs   Lab Test 05/02/24  0717 05/06/20  0928 08/28/19  0850   INR 1.12 1.13 1.18*     Recent Labs   Lab Test 01/31/23  0728 01/30/23  0748 01/29/23  0724   CRPI 13.74* 22.19* 44.64*         1. PLAN:   Lovenox while in the hospital for DVT prophylaxis may transition to ASA 325mg on D/C    Mobilize with PT/OT    NWB in a short leg splint.    Continue current pain regimen   Dressings: Keep intact.  Change if >60% saturated or peeling off.    Follow-up: 2 weeks post-op with Dr. Evans/ Chuyita De Luna PA-C    2. Disposition   Anticipate d/c to TCU when medically cleared and progressing in PT.    Juliann Bergeron PA-C

## 2024-05-07 NOTE — PROGRESS NOTES
"   IP Clinical Swallow Evaluation  [facility]  05/07/24 1223   Appointment Info   Signing Clinician's Name / Credentials (SLP) Bakari Myers MS CCC-SLP   General Information   Onset of Illness/Injury or Date of Surgery 05/01/24   Referring Physician Jailene Dumont, DO   Patient/Family Therapy Goal Statement (SLP) To get rid of this cough.   Pertinent History of Current Problem Per provider H&P \"Daya Ignacio is a 89 year old female with PMH including HTN, MDD, anxiety, mild cognitive impairment, anemia, chronic back pain from compression fractures, UTI, GI bleed, nephrolithiasis, C. difficile, and falls with previous fractures including right tib/fib, pubic rami, and hip who presents from her nursing home after an unwitnessed fall.  She was in the bathroom when she fell.  She does not remember exactly how she fell, but she does not think she had loss of consciousness.  She had severe pain in the left ankle.  She pressed her life alert pendant to get assistance.  Denies any new pain elsewhere.  She does have chronic back pain.  Denies any headache, lightheadedness, confusion currently.  No recent fevers, chills, dysuria.\" SLP consult due to frequent cough that may be exacerbated by food/pills.   General Observations Patient alert and cooperative; [place].   Pain Assessment   Patient Currently in Pain No   Type of Evaluation   Type of Evaluation Swallow Evaluation   Oral Motor   Oral Musculature generally intact   Structural Abnormalities none present   Mucosal Quality dry   Dentition (Oral Motor)   Dentition (Oral Motor) natural dentition;dental appliance/dentures;some missing teeth   Dental Appliance/Denture (Oral Motor) lower  (partial)   Facial Symmetry (Oral Motor)   Facial Symmetry (Oral Motor) WNL   Lip Function (Oral Motor)   Lip Range of Motion (Oral Motor) WNL   Lip Strength (Oral Motor) WFL   Tongue Function (Oral Motor)   Tongue Strength (Oral Motor) WFL   Tongue Coordination/Speed " "(Oral Motor) WNL   Tongue ROM (Oral Motor) WNL   Jaw Function (Oral Motor)   Jaw Function (Oral Motor) WNL   Vocal Quality/Secretion Management (Oral Motor)   Vocal Quality (Oral Motor) WFL   Secretion Management (Oral Motor) WNL   General Swallowing Observations   Past History of Dysphagia h/o esophageal dysphagia. Video swallow study completed 1/24/23 with the following results: \" Pt currently presents with minimal oropharyngeal dysphagia. Mastication and oral propulsion appear WFL; trace lingual/ base of tongue residuals noted though insignificant. Base of tongue retraction WFL, mildly reduced hyolaryngeal elevation/excursion, mildly reduced epiglottic inversion. Pharyngoesophageal segement (PES) opening during the swallow appears functional despite notable mild prominence of cricopharyngeus -- this did not impede bolus transfer into upper esophagus. No pharyngeal residuals. X1 instance of flash penetration on first sip of thin liquids when fed via tsp; no laryngeal penetration/aspiration with thin via cup/straw or solids.\"   Respiratory Support nasal cannula  (2 LPM)   Current Diet/Method of Nutritional Intake (General Swallowing Observations, NIS) regular diet;thin liquids (level 0)   Swallowing Evaluation Clinical swallow evaluation   Clinical Swallow Evaluation   Feeding Assistance minimal assistance required   Clinical Swallow Evaluation Textures Trialed thin liquids;pureed;solid foods   Clinical Swallow Eval: Thin Liquid Texture Trial   Mode of Presentation, Thin Liquids straw;self-fed   Volume of Liquid or Food Presented 2 ounces water   Oral Phase of Swallow WFL   Pharyngeal Phase of Swallow intact   Diagnostic Statement WN   Clinical Swallow Evaluation: Puree Solid Texture Trial   Mode of Presentation, Puree spoon;self-fed   Volume of Puree Presented 4 ounces of pudding   Oral Phase, Puree WFL   Pharyngeal Phase, Puree intact   Diagnostic Statement WFL   Clinical Swallow Evaluation: Solid Food Texture " Trial   Mode of Presentation self-fed   Volume Presented melany bateman with pudding   Oral Phase WFL   Pharyngeal Phase intact   Diagnostic Statement WFL   Esophageal Phase of Swallow   Patient reports or presents with symptoms of esophageal dysphagia Yes  (h/o esophageal dysphagia)   Swallowing Recommendations   Diet Consistency Recommendations regular diet;thin liquids (level 0)   Supervision Level for Intake patient independent   Mode of Delivery Recommendations bolus size, small;slow rate of intake   Recommended Feeding/Eating Techniques (Swallow Eval) maintain upright sitting position for eating;maintain upright posture during/after eating for 30 minutes   Medication Administration Recommendations, Swallowing (SLP) whole with liquids, as able   Instrumental Assessment Recommendations instrumental evaluation not recommended at this time   General Therapy Interventions   Planned Therapy Interventions Dysphagia Treatment   Dysphagia treatment Instruction of safe swallow strategies   Clinical Impression   Criteria for Skilled Therapeutic Interventions Met (SLP Eval) Yes, treatment indicated   SLP Diagnosis Functional oropharygeal dysphagia   Risks & Benefits of therapy have been explained evaluation/treatment results reviewed;care plan/treatment goals reviewed;risks/benefits reviewed;current/potential barriers reviewed;participants voiced agreement with care plan;participants included;patient   Clinical Impression Comments Clinical swallow evaluation completed per provider orders. Trials of thin, puree and solids assessed. Patient presents with a functional oropharyngeal dysphagia. [facility] exam unremarkable. Adequate mastication with good oral clearing. Timely swallow with no c/o food sticking. Patient does have a congested cough that was present prior to, during, and after po trials and did not appear to increase during po intake. This cough did not appear related to aspiration. SLP will follow for 1-2 sessions  for assessment of diet tolerance and education on general safe swallow strategies.   SLP Total Evaluation Time   Eval: oral/pharyngeal swallow function, clinical swallow Minutes (43341) 13   Total Session Time   Total Session Time (sum of timed and untimed services) 13

## 2024-05-07 NOTE — PLAN OF CARE
"Goal Outcome Evaluation:      Plan of Care Reviewed With: patient    Overall Patient Progress: no changeOverall Patient Progress: no change    Outcome Evaluation: Pt alert with some confusion at times. Pt did c/o of ankle and back pain managed with PRN oxy and tylenol. Good output.      Problem: Adult Inpatient Plan of Care  Goal: Plan of Care Review  Description: The Plan of Care Review/Shift note should be completed every shift.  The Outcome Evaluation is a brief statement about your assessment that the patient is improving, declining, or no change.  This information will be displayed automatically on your shift  note.  Outcome: Not Progressing  Flowsheets (Taken 5/7/2024 0633)  Outcome Evaluation: Pt alert with some confusion at times. Pt did c/o of ankle and back pain managed with PRN oxy and tylenol. Good output.  Plan of Care Reviewed With: patient  Overall Patient Progress: no change  Goal: Patient-Specific Goal (Individualized)  Description: You can add care plan individualizations to a care plan. Examples of Individualization might be:  \"Parent requests to be called daily at 9am for status\", \"I have a hard time hearing out of my right ear\", or \"Do not touch me to wake me up as it startles  me\".  Outcome: Not Progressing  Goal: Absence of Hospital-Acquired Illness or Injury  Outcome: Not Progressing  Intervention: Identify and Manage Fall Risk  Recent Flowsheet Documentation  Taken 5/6/2024 2056 by Maggie Quezada RN  Safety Promotion/Fall Prevention: safety round/check completed  Intervention: Prevent Skin Injury  Recent Flowsheet Documentation  Taken 5/6/2024 2056 by Maggie Quezada RN  Body Position:   left   right  Skin Protection:   adhesive use limited   incontinence pads utilized  Intervention: Prevent and Manage VTE (Venous Thromboembolism) Risk  Recent Flowsheet Documentation  Taken 5/6/2024 2056 by Maggie Quezada RN  VTE Prevention/Management: SCDs (sequential compression devices) on  Intervention: " Prevent Infection  Recent Flowsheet Documentation  Taken 5/6/2024 2056 by Maggie Quezada, RN  Infection Prevention:   rest/sleep promoted   single patient room provided  Goal: Optimal Comfort and Wellbeing  Outcome: Not Progressing  Intervention: Monitor Pain and Promote Comfort  Recent Flowsheet Documentation  Taken 5/6/2024 2056 by Maggie Quezada, RN  Pain Management Interventions: medication (see MAR)  Goal: Readiness for Transition of Care  Outcome: Not Progressing

## 2024-05-07 NOTE — PROGRESS NOTES
[facility]    Medicine Progress Note - Hospitalist Service    Date of Admission:  5/1/2024    Assessment & Plan   Summary: Daya Ignacio is a 89 year old female with PMHx of HTN, MDD, anxiety, mild cognitive impairment, anemia, chronic back pain from compression fractures, GIB, nephrolithiasis, C diff, and falls with previous fractures of R tib/fib, pubic rami, and hip, who was admitted on 5/1/2024 with unwitnessed fall at [facility] and found to have comminuted L distal fibula shaft fractures.      Unwitnessed fall  Acute comminuted left distal tibia and distal fibula/malleolar fractures s/p ORIF 5/3/24  Chronic back pain  History spinal compression fractures  -Status post ORIF 5/3/24  -Pain control as ordered  -PT/OT per Ortho  -Fall precaution   -Risk of postoperative delirium, needs close monitoring  -Resume PTA medications     Hypoxia, improving  Unable to wean off oxygen post operatively. Cough and upper airway congestion but normal lung exam. CXR with normal sized cardiac silhouette, linear fibrotic changes versus subsegmental atelectasis within the left mid to lower lung, visualized lungs are otherwise clear. Suspect atelectasis as etiology of hypoxia. May have URI/bronchitis leading to cough.  - Mucinex, robitussin, tessalon pearls  - PRN albuterol neb  - incentive spirometry, has trouble understanding how to use it  - Wean oxygen as able, likely will need oxygen on discharge to TCU    Acute on chronic anemia, stable  Baseline Hgb of 8. Post op Hgb 6.7 and 6.4 on repeat. EBL 100cc. Suspect dilutional.   Hgb 9.1 after 1U pRBC on 5/4. Suspect this was falsely high, repeat Hgb 7.8 on 5/6 and 5/7. No s/s of bleeding.    Chronic medical condition:  HTN: PTA Coreg 12.5 mg twice daily, Lisinopril 30 mg daily, Isordil 10 mg 3 times daily, clonidine 0.2 mg at bedtime and amlodipine 10 mg daily.  -Continue Coreg, amlodipine, Imdur, lisinopril and clonidine      Mild cognitive impairment:   -Has some  "memory issues, at risk of postop delirium.  -Close monitoring .  -Fall precautions     History of UTIs and nephrolithiasis:   -UA abnormal with large LE and WBC but no nitrites. Culture with mixed urogenital jorge. No symptoms. 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 Will hold off on antibiotics and monitor for symptoms.       GERD  History of GI bleed:   -Continue PPI as ordered.     MDD  Anxiety:   -Continue sertraline 100 mg daily.          Diet: Advance Diet as Tolerated: Regular Diet Adult    DVT Prophylaxis: Enoxaparin (Lovenox) SQ  Almanza Catheter: Not present  Lines: None     Cardiac Monitoring: None  Code Status: No CPR- Do NOT Intubate      Clinically Significant Risk Factors                  # Hypertension: Noted on problem list        # Cachexia: Estimated body mass index is 17.71 kg/m  as calculated from the following:    Height as of this encounter: 1.626 m (5' 4\").    Weight as of this encounter: 46.8 kg (103 lb 2.8 oz).      # Financial/Environmental Concerns: none         Disposition Plan     Medically Ready for Discharge: Ready Now, TCU able to accept patient tomorrow             Jailene Dumont DO  Hospitalist Service  [facility]  Securely message with [employer] (more info)  Text page via [email or web address] Paging/Directory   ______________________________________________________________________    Interval History   No acute overnight events. Having some mild leg pain. Just received pain med. No acute concerns.     Physical Exam   Vital Signs: Temp: 98.1  F (36.7  C) Temp src: Temporal BP: 139/58 Pulse: 70   Resp: 20 SpO2: 98 % O2 Device: Nasal cannula Oxygen Delivery: 2 LPM  Weight: 103 lbs 2.8 oz    Constitutional: Awake, alert, no distress, and cooperative  Cardiovascular: Regular rate and rhythm, normal S1 and S2, no S3 or S4, and no murmur noted  Respiratory: No increased work of breathing, good air exchange, clear to auscultation bilaterally, no " crackles or wheezing  Gastrointestinal: Abdomen soft, non-tender, non-distended. BS normal. No masses, organomegaly     Medical Decision Making       45 MINUTES SPENT BY ME on the date of service doing chart review, history, exam, documentation & further activities per the note.      Data     I have personally reviewed the following data over the past 24 hrs:    N/A  \   7.8 (L)   / N/A     N/A N/A N/A /  N/A   N/A N/A N/A \

## 2024-05-07 NOTE — PROGRESS NOTES
Care Management Follow Up    Length of Stay (days): 5    Expected Discharge Date: 05/07/2024     Concerns to be Addressed: discharge planning     Patient plan of care discussed at interdisciplinary rounds: Yes    Anticipated Discharge Disposition: Transitional Care     Anticipated Discharge Services:    Anticipated Discharge DME:      Patient/family educated on Medicare website which has current facility and service quality ratings: yes  Education Provided on the Discharge Plan:  no  Patient/Family in Agreement with the Plan: yes    Referrals Placed by CM/SW: Post Acute Facilities, Transportation    Additional Information:  CM left message with [facility] and [facility] to see if they can accept for TCU.    Addendum 1405  Patient was accepted by [facility] and CM called son and he accepts. TCU can take patient tomorrow.  w/c transport with a transport window of [id number].  CM informed TCU, son , staff RN, Charge RN.    Erinn Franco, RN, BSN, CM  Inpatient Care Coordination  [facility]  [phone]

## 2024-05-07 NOTE — PLAN OF CARE
Goal Outcome Evaluation:      Plan of Care Reviewed With: patient    Overall Patient Progress: improving    Outcome Evaluation: Pt alert with some intermittent confusion. Splint intact to LLE, LLE elevated.  Up to chair this afternoon, transfers with Ax2 et lift.  Taking Tylenol and oxycodone for pain.  Good appetite-needs set up assistance. Still has congested, nonproductive cough, but improved since yesterday.  Remains on 2L of O2. Plan is for TCU tomorrow.

## 2024-05-08 ENCOUNTER — LAB REQUISITION (OUTPATIENT)
Dept: LAB | Facility: CLINIC | Age: 89
End: 2024-05-08
Payer: COMMERCIAL

## 2024-05-08 ENCOUNTER — DOCUMENTATION ONLY (OUTPATIENT)
Dept: GERIATRICS | Facility: CLINIC | Age: 89
End: 2024-05-08
Payer: COMMERCIAL

## 2024-05-08 ENCOUNTER — APPOINTMENT (OUTPATIENT)
Dept: SPEECH THERAPY | Facility: CLINIC | Age: 89
DRG: 493 | End: 2024-05-08
Payer: COMMERCIAL

## 2024-05-08 VITALS
RESPIRATION RATE: 20 BRPM | DIASTOLIC BLOOD PRESSURE: 53 MMHG | TEMPERATURE: 98.9 F | WEIGHT: 103.17 LBS | BODY MASS INDEX: 17.61 KG/M2 | HEART RATE: 62 BPM | SYSTOLIC BLOOD PRESSURE: 134 MMHG | OXYGEN SATURATION: 95 % | HEIGHT: 64 IN

## 2024-05-08 DIAGNOSIS — Z11.1 ENCOUNTER FOR SCREENING FOR RESPIRATORY TUBERCULOSIS: ICD-10-CM

## 2024-05-08 PROCEDURE — 92526 ORAL FUNCTION THERAPY: CPT | Mod: GN

## 2024-05-08 PROCEDURE — 250N000013 HC RX MED GY IP 250 OP 250 PS 637: Performed by: STUDENT IN AN ORGANIZED HEALTH CARE EDUCATION/TRAINING PROGRAM

## 2024-05-08 PROCEDURE — 250N000013 HC RX MED GY IP 250 OP 250 PS 637

## 2024-05-08 PROCEDURE — 99239 HOSP IP/OBS DSCHRG MGMT >30: CPT | Performed by: STUDENT IN AN ORGANIZED HEALTH CARE EDUCATION/TRAINING PROGRAM

## 2024-05-08 PROCEDURE — 250N000013 HC RX MED GY IP 250 OP 250 PS 637: Performed by: INTERNAL MEDICINE

## 2024-05-08 RX ADMIN — ASPIRIN 325 MG: 325 TABLET ORAL at 08:42

## 2024-05-08 RX ADMIN — ISOSORBIDE DINITRATE 10 MG: 10 TABLET ORAL at 08:42

## 2024-05-08 RX ADMIN — LISINOPRIL 30 MG: 20 TABLET ORAL at 08:41

## 2024-05-08 RX ADMIN — AMLODIPINE BESYLATE 10 MG: 10 TABLET ORAL at 08:41

## 2024-05-08 RX ADMIN — SERTRALINE HYDROCHLORIDE 100 MG: 100 TABLET ORAL at 08:41

## 2024-05-08 RX ADMIN — GUAIFENESIN 600 MG: 600 TABLET, EXTENDED RELEASE ORAL at 08:42

## 2024-05-08 RX ADMIN — BENZONATATE 100 MG: 100 CAPSULE ORAL at 11:12

## 2024-05-08 RX ADMIN — CARVEDILOL 12.5 MG: 12.5 TABLET, FILM COATED ORAL at 08:42

## 2024-05-08 RX ADMIN — ACETAMINOPHEN 650 MG: 325 TABLET, FILM COATED ORAL at 09:58

## 2024-05-08 RX ADMIN — PANTOPRAZOLE SODIUM 40 MG: 40 TABLET, DELAYED RELEASE ORAL at 08:42

## 2024-05-08 RX ADMIN — OXYCODONE HYDROCHLORIDE 5 MG: 5 TABLET ORAL at 11:12

## 2024-05-08 RX ADMIN — FERROUS GLUCONATE 324 MG: 324 TABLET ORAL at 08:42

## 2024-05-08 ASSESSMENT — ACTIVITIES OF DAILY LIVING (ADL)
ADLS_ACUITY_SCORE: 52
ADLS_ACUITY_SCORE: 48
ADLS_ACUITY_SCORE: 46
ADLS_ACUITY_SCORE: 47
ADLS_ACUITY_SCORE: 52
ADLS_ACUITY_SCORE: 46
ADLS_ACUITY_SCORE: 46
ADLS_ACUITY_SCORE: 47
ADLS_ACUITY_SCORE: 46
ADLS_ACUITY_SCORE: 47

## 2024-05-08 NOTE — DISCHARGE SUMMARY
"Patient discharged to TCU. EMS transport. Son at bedside when patient was leaving. All questions answered. Last vitals:    /53 (BP Location: Left arm)   Pulse 62   Temp 98.9  F (37.2  C) (Temporal)   Resp 20   Ht 1.626 m (5' 4\")   Wt 46.8 kg (103 lb 2.8 oz)   SpO2 95%   Breastfeeding No   BMI 17.71 kg/m     "

## 2024-05-08 NOTE — PLAN OF CARE
Occupational Therapy Discharge Summary    Reason for therapy discharge:    Discharged to transitional care facility.    Progress towards therapy goal(s). See goals on Care Plan in [facility] electronic health record for goal details.  Goals not met.  Barriers to achieving goals:   discharge from facility.    Therapy recommendation(s):    Continued therapy is recommended.  Rationale/Recommendations:  Pt. presents below baseline for functional ambulation, functional transfers, and LE dressing for safe return to jail. Pt. was modified independent with ADLs and setup and assist for toielting and showering at baseline. Recommend discharge to TCU for advance ADL training, functional transfer training, progressive exercise needed for safe return to ADLs and IADLs..

## 2024-05-08 NOTE — DISCHARGE SUMMARY
"[facility]  Hospitalist Discharge Summary      Date of Admission:  5/1/2024  Date of Discharge:  5/8/2024 12:58 PM  Discharging Provider: Jailene Dumont DO  Discharge Service: Hospitalist Service    Discharge Diagnoses   Unwitnessed fall  Acute comminuted left distal tibia and distal fibula/malleolar fractures s/p ORIF 5/3/24  Chronic back pain  History spinal compression fractures  Hypoxia, improving  Acute on chronic anemia, stable  HTN  Mild cognitive impairment   History of UTIs and nephrolithiasis  GERD  History of GI bleed  MDD  Anxiety    Clinically Significant Risk Factors     # Cachexia: Estimated body mass index is 17.71 kg/m  as calculated from the following:    Height as of this encounter: 1.626 m (5' 4\").    Weight as of this encounter: 46.8 kg (103 lb 2.8 oz).       Follow-ups Needed After Discharge   Follow-up Appointments     Follow Up and recommended labs and tests      Please call as soon as possible to make an appointment to be seen in Dr. Colin Evans's clinic at 2 weeks postop for a recheck of your surgical   site, possible repeat x-rays, and wound care. If you are at a TCU at this   time and they have x-ray capabilities, you may complete your wound care   and x-rays at your TCU and have them send your images to Dr. Evans's   office.     Dr. Evans's care coordinator is Emmy Browne. Please contact her at   [phone] to schedule an appointment.       Dr. Evans sees patients at 2 clinic locations:  [facility]  [address] [place] [address] [id number] [place] [address] Orthopedics [facility]   [address], [address], [place] [address]        Please call the on-call phone number [phone] during evenings, nights   and weekends for any urgent needs. Prescription refills must be done   during business hours by calling [phone].            Discharge Disposition   Discharged to short-term rehab facility  Condition at discharge: " Stable    Hospital Course   Summary: Daya Ignacio is a 89 year old female with PMHx of HTN, MDD, anxiety, mild cognitive impairment, anemia, chronic back pain from compression fractures, GIB, nephrolithiasis, C diff, and falls with previous fractures of R tib/fib, pubic rami, and hip, who was admitted on 5/1/2024 with unwitnessed fall at [facility] and found to have comminuted L distal fibula shaft fractures.      Unwitnessed fall  Acute comminuted left distal tibia and distal fibula/malleolar fractures s/p ORIF 5/3/24  Chronic back pain  History spinal compression fractures  -Status post ORIF 5/3/24  -Pain control as ordered  -PT/OT   -Fall precaution   -Resume PTA medications     Hypoxia, improving  Unable to wean off oxygen post operatively. Cough and upper airway congestion but normal lung exam. CXR with normal sized cardiac silhouette, linear fibrotic changes versus subsegmental atelectasis within the left mid to lower lung, visualized lungs are otherwise clear. Suspect atelectasis as etiology of hypoxia. May have URI/bronchitis leading to cough.  - Mucinex, tessalon pearls  - PRN albuterol neb  - incentive spirometry, has trouble understanding how to use it  - Discharged with oxygen 2L NC    Acute on chronic anemia, stable  Baseline Hgb of 8. Post op Hgb 6.7 and 6.4 on repeat. EBL 100cc. Suspect dilutional.   Hgb 9.1 after 1U pRBC on 5/4. Suspect this was falsely high, repeat Hgb 7.8 on 5/6 and 5/7. No s/s of bleeding.    Chronic medical condition:  HTN: PTA Coreg 12.5 mg twice daily, Lisinopril 30 mg daily, Isordil 10 mg 3 times daily, clonidine 0.2 mg at bedtime and amlodipine 10 mg daily.  -Continue Coreg, amlodipine, Imdur, lisinopril and clonidine      Mild cognitive impairment:   -Has some memory issues, at risk of postop delirium.  -Close monitoring .  -Fall precautions     History of UTIs and nephrolithiasis:   -UA abnormal with large LE and WBC but no nitrites. Culture with mixed urogenital jorge. No  symptoms. 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 Will hold off on antibiotics and monitor for symptoms.       GERD  History of GI bleed:   -Continue PPI      MDD  Anxiety:   -Continue sertraline 100 mg daily.    Consultations This Hospital Stay   ORTHOPEDIC SURGERY IP CONSULT  PHYSICAL THERAPY ADULT IP CONSULT  OCCUPATIONAL THERAPY ADULT IP CONSULT  CARE MANAGEMENT / SOCIAL WORK IP CONSULT  CARE MANAGEMENT / SOCIAL WORK IP CONSULT  PHYSICAL THERAPY ADULT IP CONSULT  OCCUPATIONAL THERAPY ADULT IP CONSULT  PHYSICAL THERAPY ADULT IP CONSULT  OCCUPATIONAL THERAPY ADULT IP CONSULT  SPEECH LANGUAGE PATH ADULT IP CONSULT  SPEECH LANGUAGE PATH ADULT IP CONSULT  ORTHOSIS BRACE IP CONSULT    Code Status   No CPR- Do NOT Intubate    Time Spent on this Encounter   I, Jailene Dumont DO, personally saw the patient today and spent greater than 30 minutes discharging this patient.       Jailene Dumont DO  [facility]  [address] [place] [address]  Phone: [phone]  Fax: [phone]  ______________________________________________________________________    Physical Exam   Vital Signs: Temp: 98.9  F (37.2  C) Temp src: Temporal BP: 134/53 Pulse: 62   Resp: 20 SpO2: 95 % O2 Device: Nasal cannula Oxygen Delivery: 2 LPM  Weight: 103 lbs 2.8 oz  Constitutional: Awake, alert, no distress, and cooperative  Cardiovascular: Regular rate and rhythm, normal S1 and S2, no S3 or S4, and no murmur noted  Respiratory: No increased work of breathing, good air exchange, clear to auscultation bilaterally, no crackles or wheezing  Gastrointestinal: Abdomen soft, non-tender, non-distended. BS normal. No masses, organomegaly        Primary Care Physician   DINORAH DISLA    Discharge Orders      General info for SNF    Length of Stay Estimate: Short Term Care: Estimated # of Days <30  Condition at Discharge: Stable  Level of care:skilled   Rehabilitation Potential:  Good  Admission H&P remains valid and up-to-date: Yes  Recent Chemotherapy: N/A  Use Nursing Home Standing Orders: Yes     Mantoux instructions    Give two-step Mantoux (PPD) Per Facility Policy Yes     Follow Up and recommended labs and tests    Please call as soon as possible to make an appointment to be seen in Dr. Colin Evans's clinic at 2 weeks postop for a recheck of your surgical site, possible repeat x-rays, and wound care. If you are at a TCU at this time and they have x-ray capabilities, you may complete your wound care and x-rays at your TCU and have them send your images to Dr. Evans's office.     Dr. Evans's care coordinator is Emmy Browne. Please contact her at [phone] to schedule an appointment.       Dr. Evans sees patients at 2 clinic locations:  [facility]  [address] [place] [address]  [facility]   [address], [place] [address]        Please call the on-call phone number [phone] during evenings, nights and weekends for any urgent needs. Prescription refills must be done during business hours by calling [phone].     Reason for your hospital stay    Left tib/fib ORIF via nail for fracture (5/3/2024)     Weight bearing status    Non weight bearing x 4-5 weeks     Wound care (specify)    Site:   left lower extremity   Instructions:  Do not submerge in water. Cover for showers. Change if >60% saturation.     Activity - Up with nursing assistance     Physical Therapy Adult Consult    Evaluate and treat as clinically indicated.    Reason:   Left tib/fib ORIF via nail (5/3/2024)     Occupational Therapy Adult Consult    Evaluate and treat as clinically indicated.    Reason:   Left tib/fib ORIF via nail (5/3/2024)     Speech Language Path Adult Consult    Evaluate and treat as clinically indicated.    Reason:  dysphagia     Contact Isolation     Oxygen (SNF/TCU) Discharge     Fall precautions     Fall  precautions     Walker DME    DME Documentation: Describe the reason for need to support medical necessity: Impaired gait due to Foot/Ankle surgery. Anticipated length of need: 3 months     Wheelchair DME    DME Documentation: Describe the reason for need to support medical necessity: Impaired gait due to Foot/Ankle surgery. Anticipated length of need: 3 months     Diet    Follow this diet upon discharge: Regular Diet Adult       Significant Results and Procedures   Results for orders placed or performed during the hospital encounter of 05/01/24   CT Head w/o Contrast    Narrative    EXAM: CT HEAD W/O CONTRAST, CT CERVICAL SPINE W/O CONTRAST  LOCATION: [facility]  DATE: 5/1/2024    INDICATION: Unwitnessed fall.  COMPARISON: CT head from 2/7/2023 and CTA head and neck 2/6/2023.  TECHNIQUE:   1) Routine CT Head without IV contrast. Multiplanar reformats. Dose reduction techniques were used.  2) Routine CT Cervical Spine without IV contrast. Multiplanar reformats. Dose reduction techniques were used.    FINDINGS:   HEAD CT:   INTRACRANIAL CONTENTS: No intracranial hemorrhage, extraaxial collection, or mass effect. No CT evidence of acute infarct. Mild to moderate volume loss and mild burden presumed chronic small vessel ischemia.    VISUALIZED ORBITS/SINUSES/MASTOIDS: No intraorbital abnormality. No paranasal sinus mucosal disease. No middle ear or mastoid effusion.    BONES/SOFT TISSUES: Question mild right periorbital soft tissue swelling. No fracture of the visualized facial structures. Small ovoid benign-appearing fibro-osseous structure in the right aspect of the clivus adjacent to the sphenoid sinus likely   represents arrested pneumatization as an incidental finding. It is stable.    CERVICAL SPINE CT:   VERTEBRA: The patient is osteopenic. 3 mm degenerative anterolisthesis of C3 on C4 and mild degenerative anterolisthesis C7 on T1. Alignment is otherwise normal. No acute cervical spine  fracture. Marked loss of disc space height C4-C5 through C6-C7,   marked left C3-C4, C4-C5 facet arthropathy. Mild age-indeterminate mild chronic superior endplate compressions of T2-T4 again noted.     CANAL/FORAMINA: Mild to moderate C4-C5 degenerative canal narrowing, marked bilateral C3-C4, C4-C5, and on the left at C5-C6.    PARASPINAL: Multinodular thyroid gland again noted. Visualized lung fields are clear.      Impression    IMPRESSION:  HEAD CT:  1.  Question mild right periorbital soft tissue swelling. No acute intracranial hemorrhage or calvarial fracture.    2.  Age-related and chronic ischemic changes are stable.    CERVICAL SPINE CT:  1.  No acute cervical spine fracture.    2.  Mild chronic T2-T4 compressions are stable.   CT Cervical Spine w/o Contrast    Narrative    EXAM: CT HEAD W/O CONTRAST, CT CERVICAL SPINE W/O CONTRAST  LOCATION: [facility]  DATE: 5/1/2024    INDICATION: Unwitnessed fall.  COMPARISON: CT head from 2/7/2023 and CTA head and neck 2/6/2023.  TECHNIQUE:   1) Routine CT Head without IV contrast. Multiplanar reformats. Dose reduction techniques were used.  2) Routine CT Cervical Spine without IV contrast. Multiplanar reformats. Dose reduction techniques were used.    FINDINGS:   HEAD CT:   INTRACRANIAL CONTENTS: No intracranial hemorrhage, extraaxial collection, or mass effect. No CT evidence of acute infarct. Mild to moderate volume loss and mild burden presumed chronic small vessel ischemia.    VISUALIZED ORBITS/SINUSES/MASTOIDS: No intraorbital abnormality. No paranasal sinus mucosal disease. No middle ear or mastoid effusion.    BONES/SOFT TISSUES: Question mild right periorbital soft tissue swelling. No fracture of the visualized facial structures. Small ovoid benign-appearing fibro-osseous structure in the right aspect of the clivus adjacent to the sphenoid sinus likely   represents arrested pneumatization as an incidental finding. It is  stable.    CERVICAL SPINE CT:   VERTEBRA: The patient is osteopenic. 3 mm degenerative anterolisthesis of C3 on C4 and mild degenerative anterolisthesis C7 on T1. Alignment is otherwise normal. No acute cervical spine fracture. Marked loss of disc space height C4-C5 through C6-C7,   marked left C3-C4, C4-C5 facet arthropathy. Mild age-indeterminate mild chronic superior endplate compressions of T2-T4 again noted.     CANAL/FORAMINA: Mild to moderate C4-C5 degenerative canal narrowing, marked bilateral C3-C4, C4-C5, and on the left at C5-C6.    PARASPINAL: Multinodular thyroid gland again noted. Visualized lung fields are clear.      Impression    IMPRESSION:  HEAD CT:  1.  Question mild right periorbital soft tissue swelling. No acute intracranial hemorrhage or calvarial fracture.    2.  Age-related and chronic ischemic changes are stable.    CERVICAL SPINE CT:  1.  No acute cervical spine fracture.    2.  Mild chronic T2-T4 compressions are stable.   XR Ankle Left 2 Views    Narrative    EXAM: XR ANKLE LEFT 2 VIEWS  LOCATION: [facility]  DATE: 5/1/2024    INDICATION: fall, deformity  COMPARISON: None.      Impression    IMPRESSION: Severely comminuted and moderately displaced fractures of the distal diaphyses of the tibia and fibula. Osteopenia. Underlying lucency about the fractures may be due to slightly asymmetric osteopenia, however pathologic fractures are not   entirely excluded. Soft tissue swelling about the distal lower extremity and ankle. Small dorsal surface calcaneal spur.   CT Ankle Left w/o Contrast    Narrative    CT ANKLE LEFT WITHOUT CONTRAST 5/2/2024 10:50 AM     HISTORY: Left distal tib/fib fractures, CT for surgical planning  COMPARISON: Radiographs from 5/1/2024    TECHNIQUE: Volumetric helical acquisition of axial CT image data were  acquired through the left ankle without contrast. The axial image data  were used to reconstructed sagittal and coronal images. Radiation  dose  for this scan was reduced using automated exposure control, adjustment  of the mA and/or kV according to patient size, or iterative  reconstruction technique.    FINDINGS:  Acute comminuted transversely oriented fractures of the distal tibial  and fibular diaphyses. There is 4 mm medial displacement of the  fibular fracture. There is mild posterior fracture apex angulation of  the tibial and fibular fractures. No intra-articular extension.    There is normal joint alignment. Slight widening of the tibiotalar  joint medially may reflect cartilage thinning although cannot exclude  a ligamentous injury. Mild degenerative changes throughout the  hindfoot. Small accessory navicular. Osteopenia. Tiny Achilles  calcaneal enthesophyte. No joint effusion.    Mild soft tissue swelling around the ankle. No discrete fluid  collection. The ankle tendons appear grossly intact. No evidence of  tendon entrapment. Intact anterior talofibular ligament. Diffuse fatty  muscle atrophy. Mild vascular calcification.      Impression    IMPRESSION:  1.  Acute comminuted fractures of the distal tibial and fibular  diaphyses with mild displacement.  2.  Slight widening of the tibiotalar joint medially may reflect  cartilage thinning although cannot exclude a ligamentous injury.  3.  Other ancillary findings as described above.    ELMER BURNETTE MD         SYSTEM ID:  [id number]   XR Tibia and Fibula Left 2 Views    Narrative    XR TIBIA AND FIBULA LEFT 2 VIEWS  5/2/2024 11:03 AM     HISTORY: Left distal tib fib fractures, ensure no proximal fracture  COMPARISON: None      Impression    IMPRESSION: Acute comminuted fractures of the distal tibial and  fibular diaphyses with mild displacement. No intra-articular  extension. There is normal joint alignment. Osteopenia.  Atherosclerosis.    ELMER BURNETTE MD         SYSTEM ID:  [id number]   XR Surgery BORIS L/T 5 Min Fluoro w Stills    Narrative    This exam was marked as non-reportable  because it will not be read by a   radiologist or a [place] non-radiologist provider.         XR Tibia & Fibula Port Left 2 Views    Narrative    TIBIA AND FIBULA PORTABLE LEFT TWO VIEWS   5/3/2024 1:26 PM     HISTORY:  Status post ORIF.    COMPARISON: Radiographs from 5/2/2024.      Impression    IMPRESSION: Interval hardware placement across the tibia and fibula  fractures. Excellent position and alignment. No complication evident.  Diffuse bone demineralization again noted. Evaluation of detail  limited because of the superimposed cast material.     BALDO WALKER MD         SYSTEM ID:  [id number]   XR Chest Port 1 View    Narrative    EXAM: XR CHEST PORT 1 VIEW  LOCATION: [facility]  DATE: 5/4/2024    INDICATION: hypoxia, cough  COMPARISON: 1/26/2023      Impression    IMPRESSION: Patient is rotated to the right. Shallow inspiration. Cardiac silhouette size is within normal limits. Linear fibrotic changes versus subsegmental atelectasis within the left mid to lower lung. Visualized lungs are otherwise clear. Mild   calcified atherosclerotic changes at the aortic arch.       Discharge Medications   Discharge Medication List as of 5/8/2024  1:16 PM        START taking these medications    Details   aspirin (ASA) 325 MG tablet Take 1 tablet (325 mg) by mouth daily, Disp-42 tablet, R-0, Transitional      benzonatate (TESSALON) 100 MG capsule Take 1 capsule (100 mg) by mouth 3 times daily as needed for cough, Transitional      carbamide peroxide (DEBROX) 6.5 % otic solution Place 5 drops into both ears 2 times daily, Transitional      guaiFENesin (MUCINEX) 600 MG 12 hr tablet Take 1 tablet (600 mg) by mouth 2 times daily, Transitional      hydrOXYzine HCl (ATARAX) 10 MG tablet Take 1 tablet (10 mg) by mouth every 6 hours as needed for other (adjuvant pain), Disp-30 tablet, R-0, Transitional      methocarbamol (ROBAXIN) 500 MG tablet Take 1 tablet (500 mg) by mouth every 6 hours as needed  for muscle spasms, Disp-30 tablet, R-0, Transitional      oxyCODONE (ROXICODONE) 5 MG tablet Take 0.5 tablets (2.5 mg) by mouth every 4 hours as needed for moderate pain, Disp-12 tablet, R-0, Local Print           CONTINUE these medications which have CHANGED    Details   acetaminophen (TYLENOL) 325 MG tablet Take 2 tablets (650 mg) by mouth every 4 hours as needed for other (For optimal non-opioid multimodal pain management to improve pain control.), Disp-75 tablet, R-0, Transitional      senna-docusate (SENOKOT-S/PERICOLACE) 8.6-50 MG tablet Take 1 tablet by mouth 2 times daily as needed for constipation, Disp-30 tablet, R-0, Transitional           CONTINUE these medications which have NOT CHANGED    Details   amLODIPine (NORVASC) 10 MG tablet Take 10 mg by mouth daily, Historical      carvedilol (COREG) 12.5 MG tablet Take 12.5 mg by mouth 2 times daily (with meals), Historical      Cholecalciferol (VITAMIN D3) 2000 UNITS CAPS Take 2,000 Units by mouth daily , Historical      cloNIDine (CATAPRES) 0.1 MG tablet Take 0.1 mg by mouth at bedtime, Historical      cyanocobalamin (VITAMIN B-12) 1000 MCG tablet Take 1,000 mcg by mouth daily, Historical      Ferrous Gluconate 324 (37.5 Fe) MG TABS Take 1 tablet by mouth daily, Historical      isosorbide dinitrate (ISORDIL) 10 MG tablet Take 1 tablet (10 mg) by mouth 3 times daily, Disp-270 tablet, R-3, E-Prescribe      lisinopril (ZESTRIL) 30 MG tablet Take 30 mg by mouth daily, Historical      Multiple Vitamins-Minerals (PRESERVISION AREDS) CAPS Take 1 capsule by mouth 2 times daily , Historical      nystatin (MYCOSTATIN) 491758 UNIT/GM external cream Apply topically 2 times daily as needed (rash)Historical      omeprazole (PRILOSEC) 20 MG DR capsule Take 20 mg by mouth daily , Historical      ondansetron (ZOFRAN) 4 MG tablet Take 4 mg by mouth every 8 hours as needed for nausea, Historical      polyethylene glycol (MIRALAX/GLYCOLAX) packet Take 17 g by mouth daily as  needed for constipation, Local Print      sertraline (ZOLOFT) 100 MG tablet Take 100 mg by mouth daily, Historical           Allergies   Allergies   Allergen Reactions    Atorvastatin Muscle Pain (Myalgia)    Macrobid [Nitrofurantoin Anhydrous] Other (See Comments)     Body aches    Nitrofurantoin Muscle Pain (Myalgia) and Unknown     Body aches      Penicillins Unknown    Seasonal Allergies Unknown    Sulfa Antibiotics Other (See Comments)     Tolerated Bactrim May 2019  Entire family has allergy to Sulfa    Sulfasalazine Unknown

## 2024-05-08 NOTE — PLAN OF CARE
"Goal Outcome Evaluation:      Plan of Care Reviewed With: patient    Overall Patient Progress: improvingOverall Patient Progress: improving    Outcome Evaluation: PT alert w/ confusion. on 2L NC. Complained of some pain- Oxy given. Purewick. [facility]    Temp: 97.9  F (36.6  C) Temp src: Temporal BP: 118/45 Pulse: 57   Resp: 12 SpO2: 96 % O2 Device: None (Room air) Oxygen Delivery: 2 LPM       Problem: Adult Inpatient Plan of Care  Goal: Plan of Care Review  Description: The Plan of Care Review/Shift note should be completed every shift.  The Outcome Evaluation is a brief statement about your assessment that the patient is improving, declining, or no change.  This information will be displayed automatically on your shift  note.  Outcome: Progressing  Flowsheets (Taken 5/8/2024 6167)  Outcome Evaluation: PT alert w/ confusion. on 2L NC. Complained of some pain- Oxy given. Purewick. Transfer today to [facility] around 1150 - 1230  Plan of Care Reviewed With: patient  Overall Patient Progress: improving  Goal: Patient-Specific Goal (Individualized)  Description: You can add care plan individualizations to a care plan. Examples of Individualization might be:  \"Parent requests to be called daily at 9am for status\", \"I have a hard time hearing out of my right ear\", or \"Do not touch me to wake me up as it startles  me\".  Outcome: Progressing  Goal: Absence of Hospital-Acquired Illness or Injury  Outcome: Progressing  Intervention: Identify and Manage Fall Risk  Recent Flowsheet Documentation  Taken 5/7/2024 2000 by Roxanna Sykes RN  Safety Promotion/Fall Prevention:   safety round/check completed   nonskid shoes/slippers when out of bed   clutter free environment maintained  Intervention: Prevent and Manage VTE (Venous Thromboembolism) Risk  Recent Flowsheet Documentation  Taken 5/7/2024 2000 by Roxanna Sykes RN  VTE Prevention/Management: SCDs (sequential " compression devices) off  Intervention: Prevent Infection  Recent Flowsheet Documentation  Taken 5/7/2024 2000 by Roxanna Sykes RN  Infection Prevention:   rest/sleep promoted   hand hygiene promoted  Goal: Optimal Comfort and Wellbeing  Outcome: Progressing  Goal: Readiness for Transition of Care  Outcome: Progressing     Problem: Pain Acute  Goal: Optimal Pain Control and Function  Outcome: Progressing  Intervention: Prevent or Manage Pain  Recent Flowsheet Documentation  Taken 5/7/2024 2000 by Roxanna Sykes RN  Medication Review/Management: medications reviewed     Problem: Comorbidity Management  Goal: Blood Pressure in Desired Range  Outcome: Progressing  Intervention: Maintain Blood Pressure Management  Recent Flowsheet Documentation  Taken 5/7/2024 2000 by Roxanna Sykes RN  Medication Review/Management: medications reviewed     Problem: Orthopaedic Fracture  Goal: Absence of Bleeding  Outcome: Progressing  Goal: Bowel Elimination  Outcome: Progressing  Goal: Absence of Embolism Signs and Symptoms  Outcome: Progressing  Intervention: Prevent or Manage Embolism Risk  Recent Flowsheet Documentation  Taken 5/7/2024 2000 by Roxanna Sykes RN  VTE Prevention/Management: SCDs (sequential compression devices) off  Goal: Fracture Stability  Outcome: Progressing  Goal: Optimal Functional Ability  Outcome: Progressing  Goal: Absence of Infection Signs and Symptoms  Outcome: Progressing  Intervention: Prevent or Manage Infection  Recent Flowsheet Documentation  Taken 5/7/2024 2000 by Roxanna Sykes RN  Isolation Precautions: contact precautions maintained  Goal: Effective Tissue Perfusion  Outcome: Progressing  Goal: Optimal Pain Control and Function  Outcome: Progressing  Goal: Effective Oxygenation and Ventilation  Outcome: Progressing  Intervention: Promote Airway Secretion Clearance  Recent Flowsheet Documentation  Taken 5/7/2024 2000 by Roxanna Sykes RN  Cough And Deep Breathing: done with  encouragement     Problem: Skin Injury Risk Increased  Goal: Skin Health and Integrity  Outcome: Progressing  Intervention: Plan: Nurse Driven Intervention: Moisture Management  Recent Flowsheet Documentation  Taken 5/7/2024 2000 by Roxanna Sykes, RN  Moisture Interventions:   Urinary collection device   Incontinence pad  Intervention: Plan: Nurse Driven Intervention: Friction and Shear  Recent Flowsheet Documentation  Taken 5/7/2024 2000 by Roxanna Sykes, RN  Friction/Shear Interventions: HOB 30 degrees or less

## 2024-05-08 NOTE — PROGRESS NOTES
Care Management Discharge Note    Discharge Date: 05/08/2024       Discharge Disposition: Transitional Care    Discharge Services:      Discharge DME:      Discharge Transportation: agency    Private pay costs discussed: Not applicable    Does the patient's insurance plan have a 3 day qualifying hospital stay waiver?  No    PAS Confirmation Code: [id number]  Patient/family educated on Medicare website which has current facility and service quality ratings: yes    Education Provided on the Discharge Plan:  no  Persons Notified of Discharge Plans: staff RN, TCU, patient  Patient/Family in Agreement with the Plan: yes    Handoff Referral Completed: No    Additional Information:  Patient discharging today to [facility] via [facility] w/c transport with a transport window of 1213-3150. Orders were faxed. Patient, son, staff RN and TCU informed.    Erinn Franco, RN, BSN, CM  Inpatient Care Coordination  [facility]  [phone]      .

## 2024-05-08 NOTE — PLAN OF CARE
Goal Outcome Evaluation:      Plan of Care Reviewed With: patient    Overall Patient Progress: improvingOverall Patient Progress: improving           Patient alert. Intermittent confusion and repeated questions. Oriented x 3. Pain reported managed with tylenol and oxycodone. Coughing main complaint of patient. Scheduled and PRN cough medicine given. CMS in tact. No reports of SOB or chest pain. Plan to discharge to TCU today.

## 2024-05-08 NOTE — PROGRESS NOTES
Orthopedic Surgery  Caitlin Sandee  05/08/2024     Admit Date:  5/1/2024    POD: 5 Days Post-Op   Procedure(s):  Left Mathias C lateral malleolus fracture open reduction and internal fixation with retrograde fibular nail  Left tibia shaft open reduction and internal fixation with intramedullary nail     Patient resting comfortably in bed. Alert, hard of hearing. Pain within expectations. Continues to elevate leg. No nausea or vomiting. 2 L oxygen via nasal cannula . No new orthopedic complaints today    Temp:  [97.9  F (36.6  C)-99  F (37.2  C)] 99  F (37.2  C)  Pulse:  [57-74] 74  Resp:  [12-20] 20  BP: (118-169)/(45-64) 169/61  SpO2:  [93 %-96 %] 93 %    Alert, appropriate, and following commands  Breathing easily without wheeze     Left lower extremity:  Splint intact.    Leg was elevated  Sensations intact to light touch in the superficial peroneal, deep peroneal, tibial nerve distributions  FHL, EHL intact  Dorsalis pedis pulse 2+, good capillary refill      Labs:  Recent Labs   Lab Test 05/07/24  0617 05/06/24  0703 05/05/24  0727 05/03/24  0716 05/02/24  0717 05/01/24  2219   WBC  --   --  12.7*  --  8.6 9.5   HGB 7.8* 7.8* 9.1*   < > 9.1* 8.2*   PLT  --  145* 176  --  194 202    < > = values in this interval not displayed.     Recent Labs   Lab Test 05/02/24  0717 05/06/20  0928 08/28/19  0850   INR 1.12 1.13 1.18*     Recent Labs   Lab Test 01/31/23  0728 01/30/23  0748 01/29/23  0724   CRPI 13.74* 22.19* 44.64*         1. PLAN:   Lovenox while in the hospital for DVT prophylaxis may transition to ASA 325mg on D/C    Mobilize with PT/OT    NWB in a short leg splint.    Continue current pain regimen   Dressings: Keep intact.  Change if >60% saturated or peeling off.    Follow-up: 2 weeks post-op with Dr. Evans/ Chuyita De Luna PA-C    2. Disposition   Anticipate d/c to TCU when medically cleared and progressing in PT. Pending TCU placement     Juliann Bergeron PA-C

## 2024-05-09 ENCOUNTER — TRANSITIONAL CARE UNIT VISIT (OUTPATIENT)
Dept: GERIATRICS | Facility: CLINIC | Age: 89
End: 2024-05-09
Payer: COMMERCIAL

## 2024-05-09 ENCOUNTER — PATIENT OUTREACH (OUTPATIENT)
Dept: CARE COORDINATION | Facility: CLINIC | Age: 89
End: 2024-05-09

## 2024-05-09 VITALS
HEIGHT: 61 IN | TEMPERATURE: 97.8 F | HEART RATE: 67 BPM | BODY MASS INDEX: 21.49 KG/M2 | WEIGHT: 113.8 LBS | RESPIRATION RATE: 16 BRPM | OXYGEN SATURATION: 94 % | SYSTOLIC BLOOD PRESSURE: 129 MMHG | DIASTOLIC BLOOD PRESSURE: 62 MMHG

## 2024-05-09 DIAGNOSIS — D64.9 ANEMIA, UNSPECIFIED TYPE: ICD-10-CM

## 2024-05-09 DIAGNOSIS — S82.302D CLOSED FRACTURE OF DISTAL END OF LEFT FIBULA AND TIBIA WITH ROUTINE HEALING, SUBSEQUENT ENCOUNTER: Primary | ICD-10-CM

## 2024-05-09 DIAGNOSIS — R05.1 ACUTE COUGH: ICD-10-CM

## 2024-05-09 DIAGNOSIS — K21.9 CHRONIC GERD: ICD-10-CM

## 2024-05-09 DIAGNOSIS — I10 BENIGN ESSENTIAL HYPERTENSION: ICD-10-CM

## 2024-05-09 DIAGNOSIS — J96.01 ACUTE RESPIRATORY FAILURE WITH HYPOXIA (H): ICD-10-CM

## 2024-05-09 DIAGNOSIS — F41.1 GENERALIZED ANXIETY DISORDER: ICD-10-CM

## 2024-05-09 DIAGNOSIS — S82.832D CLOSED FRACTURE OF DISTAL END OF LEFT FIBULA AND TIBIA WITH ROUTINE HEALING, SUBSEQUENT ENCOUNTER: Primary | ICD-10-CM

## 2024-05-09 PROCEDURE — P9603 ONE-WAY ALLOW PRORATED MILES: HCPCS | Mod: ORL | Performed by: PHYSICIAN ASSISTANT

## 2024-05-09 PROCEDURE — 36415 COLL VENOUS BLD VENIPUNCTURE: CPT | Mod: ORL | Performed by: PHYSICIAN ASSISTANT

## 2024-05-09 PROCEDURE — 86481 TB AG RESPONSE T-CELL SUSP: CPT | Mod: ORL | Performed by: PHYSICIAN ASSISTANT

## 2024-05-09 PROCEDURE — 99309 SBSQ NF CARE MODERATE MDM 30: CPT | Performed by: PHYSICIAN ASSISTANT

## 2024-05-09 RX ORDER — BENZONATATE 100 MG/1
100 CAPSULE ORAL 3 TIMES DAILY
Status: SHIPPED | DISCHARGE
Start: 2024-05-09 | End: 2024-05-19

## 2024-05-09 RX ORDER — ALBUTEROL SULFATE 0.83 MG/ML
2.5 SOLUTION RESPIRATORY (INHALATION) EVERY 4 HOURS PRN
Status: SHIPPED
Start: 2024-05-09 | End: 2024-07-03

## 2024-05-09 RX ORDER — ACETAMINOPHEN 500 MG
1000 TABLET ORAL 3 TIMES DAILY
Status: SHIPPED | DISCHARGE
Start: 2024-05-09 | End: 2024-05-25

## 2024-05-09 NOTE — PLAN OF CARE
"Physical Therapy Discharge Summary    Reason for therapy discharge:    Discharged to transitional care facility.    Progress towards therapy goal(s). See goals on Care Plan in [facility] electronic health record for goal details.  Goals not met.  Barriers to achieving goals:   discharge from facility.    Therapy recommendation(s):    Continued therapy is recommended.  Rationale/Recommendations:  Per last PT note \"Patient presents below baseline for functional mobility. Currently requires overhead lift for all transfers, previously able to pivot to wheelchair. Recommend discharge to TCU in order to increase strength, activity tolerance and independence with mobility. \"      "

## 2024-05-09 NOTE — PROGRESS NOTES
[facility]    Background: Transitional Care Management program identified per system criteria and reviewed by [facility] team for possible outreach.    Assessment: Upon chart review, CCRC Team member will not proceed with patient outreach related to this episode of Transitional Care Management program due to reason below:    Non-[facility] TCU: CCRC team member noted patient discharged to TCU/ARU/LTACH. Patient is not established with a [facility] currently supported by Primary Care-Care Coordination therefore handoff to Primary Care-Care Coordination is not appropriate at this time.    Plan: Transitional Care Management episode addressed appropriately per reason noted above.          KAMINI Payne  [phone]  [facility]

## 2024-05-09 NOTE — LETTER
5/9/2024        RE: Daya Ignacio  [address]  [facility] [address]        [facility] GERIATR[facility]    PRIMARY CARE PROVIDER AND CLINIC:  [facility] [address] [address] / [place]*  Chief Complaint   Patient presents with     Hospital F/U      [place] [facility] Record Number:  [id number]  Place of Service where encounter took place:  [facility] ([facility]) [[id number]]    Daya Ignacio  is a 89 year old  (10/11/1930), admitted to the above facility from  [facility]. Hospital stay 5/1/24 through 5/8/24..   HPI:    Notes from hospitalization reviewed including H&P Ortho consult and D/c summary    Daya Ignacio is a 89-year-old female with a past medical history of hypertension, chronic back pain, mild cognitive impairment, GERD, previous C. difficile, history of right tib-fib fracture who was recently hospitalized at [facility].  Presented from her facility with a fall.  Found to have a distal tib-fib fracture.  Orthopedics consulted underwent surgical ORIF.  Postoperative course complicated by hypoxia with cough.  Chest x-ray negative.  Treated supportively.  Assessed by therapies and discharged to TCU    Spoke with RN.  Persistent cough.  O2 saturations stable on 2 L.    Daya is evaluated in her room.  Denies pain at rest in her left lower extremity.  Is currently nonweightbearing.  Does complain of persistent cough.  No history of lung disease.  Was a previous smoker.  No constipation.  No nausea.    Son, Gael updated via phone      Review of nursing home EMR: -147       CODE STATUS/ADVANCE DIRECTIVES DISCUSSION:  DNR/DNI  ALLERGIES:   Allergies   Allergen Reactions     Atorvastatin Muscle Pain (Myalgia)     Macrobid [Nitrofurantoin Anhydrous] Other (See Comments)     Body aches     Nitrofurantoin Muscle Pain (Myalgia) and Unknown     Body aches       Penicillins Unknown     Seasonal Allergies Unknown     Sulfa  Antibiotics Other (See Comments)     Tolerated Bactrim May 2019  Entire family has allergy to Sulfa     Sulfasalazine Unknown      PAST MEDICAL HISTORY:   Past Medical History:   Diagnosis Date     Anemia      Anxiety      Arthritis      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 (H28) 4/16/2014     Closed fracture of multiple pubic rami, left, initial encounter (H) 10/6/2021     Closed fracture of proximal end of right tibia and fibula 8/28/2019     Depression      h/o Clostridium difficile colitis 06/2019     Hip fracture (H) 2/28/2011     History of blood transfusion      Hypertension      Kidney stone      Lumbar compression fracture (H)      Macular degeneration (senile) of retina, unspecified      Mild cognitive impairment      Mumps      NSTEMI (non-ST elevated myocardial infarction) (H) 5/28/2017     Other chronic pain     low back     Recurrent UTI      Sepsis secondary to UTI (H) 7/29/2019     Upper GI bleed 06/2019     Urinary tract infection due to extended-spectrum beta lactamase (ESBL)-producing Klebsiella 05/2019    resistant to Bactrim      PAST SURGICAL HISTORY:   has a past surgical history that includes Esophagoscopy, gastroscopy, duodenoscopy (EGD), combined (N/A, 6/11/2019); Cystoscopy (N/A, 8/1/2019); Hip replacement NOS (Right); Hip Pinning procedure (Left); Tonsillectomy, adenoidectomy, combined; Cataract removal NOS (Bilateral); Open reduction internal fixation tibial plateau (Right, 8/28/2019); Combined Cystoscopy, Insert Stent Ureter(s) (Left, 5/6/2020); Extracorporeal shock wave lithotripsy (ESWL) (Left, 5/28/2020); Laser holmium lithotripsy ureter(s), insert stent, combined (Left, 10/30/2020); Open reduction internal fixation ankle (Left, 5/3/2024); and Open reduction internal fixation rodding intramedullary tibia (Left, 5/3/2024).  FAMILY HISTORY: family history includes Alzheimer Disease in her mother; Cardiovascular in her father; Neurologic Disorder (age of onset: 83) in her  brother.  SOCIAL HISTORY:   reports that she has quit smoking. Her smoking use included cigarettes. She has never used smokeless tobacco. She reports that she does not drink alcohol and does not use drugs.  Patient's living condition: lives in an assisted living facility  Rene giordano is a [facility] Gael my name is Caitlin Esquivel      Post Discharge Medication Reconciliation Status:   MED REC REQUIRED  Post Medication Reconciliation Status: discharge medications reconciled and changed, per note/orders       Current Outpatient Medications   Medication Sig Dispense Refill     acetaminophen (TYLENOL) 500 MG tablet Take 2 tablets (1,000 mg) by mouth 3 times daily       albuterol (PROVENTIL) (2.5 MG/3ML) 0.083% neb solution Take 1 vial (2.5 mg) by nebulization every 4 hours as needed for shortness of breath, wheezing or cough       amLODIPine (NORVASC) 10 MG tablet Take 10 mg by mouth daily       aspirin (ASA) 325 MG tablet Take 1 tablet (325 mg) by mouth daily 42 tablet 0     benzonatate (TESSALON) 100 MG capsule Take 1 capsule (100 mg) by mouth 3 times daily       carvedilol (COREG) 12.5 MG tablet Take 12.5 mg by mouth 2 times daily (with meals)       Cholecalciferol (VITAMIN D3) 2000 UNITS CAPS Take 2,000 Units by mouth daily        cloNIDine (CATAPRES) 0.1 MG tablet Take 0.1 mg by mouth at bedtime       cyanocobalamin (VITAMIN B-12) 1000 MCG tablet Take 1,000 mcg by mouth daily       Ferrous Gluconate 324 (37.5 Fe) MG TABS Take 1 tablet by mouth daily       guaiFENesin (MUCINEX) 600 MG 12 hr tablet Take 1 tablet (600 mg) by mouth 2 times daily       isosorbide dinitrate (ISORDIL) 10 MG tablet Take 1 tablet (10 mg) by mouth 3 times daily 270 tablet 3     lisinopril (ZESTRIL) 30 MG tablet Take 30 mg by mouth daily       methocarbamol (ROBAXIN) 500 MG tablet Take 1 tablet (500 mg) by mouth every 6 hours as needed for muscle spasms 30 tablet 0     nystatin (MYCOSTATIN) 183966 UNIT/GM external cream Apply  "topically 2 times daily as needed (rash)       omeprazole (PRILOSEC) 20 MG DR capsule Take 20 mg by mouth daily        ondansetron (ZOFRAN) 4 MG tablet Take 4 mg by mouth every 8 hours as needed for nausea       oxyCODONE (ROXICODONE) 5 MG tablet Take 0.5 tablets (2.5 mg) by mouth every 4 hours as needed for moderate pain 12 tablet 0     polyethylene glycol (MIRALAX/GLYCOLAX) packet Take 17 g by mouth daily as needed for constipation       senna-docusate (SENOKOT-S/PERICOLACE) 8.6-50 MG tablet Take 1 tablet by mouth 2 times daily as needed for constipation 30 tablet 0     sertraline (ZOLOFT) 100 MG tablet Take 100 mg by mouth daily       carbamide peroxide (DEBROX) 6.5 % otic solution Place 5 drops into both ears 2 times daily       Multiple Vitamins-Minerals (PRESERVISION AREDS) CAPS Take 1 capsule by mouth 2 times daily        No current facility-administered medications for this visit.       ROS:  4 point ROS including Respiratory, CV, GI and , other than that noted in the HPI,  is negative    Vitals:  /62   Pulse 67   Temp 97.8  F (36.6  C)   Resp 16   Ht 1.549 m (5' 1\")   Wt 51.6 kg (113 lb 12.8 oz)   SpO2 94%   BMI 21.50 kg/m    Exam:  Physical Exam  Vitals (Facility EMR) reviewed.   Constitutional:       General: She is not in acute distress.  HENT:      Head: Normocephalic and atraumatic.   Eyes:      General: No scleral icterus.  Cardiovascular:      Rate and Rhythm: Normal rate and regular rhythm.   Pulmonary:      Effort: Pulmonary effort is normal.      Comments: On 2L. We cough. No wheezing. Diminished at bases  Musculoskeletal:      Comments: LLE in splint/ace. CMS intact   Skin:     General: Skin is warm and dry.      Findings: No rash.   Neurological:      Mental Status: She is alert. Mental status is at baseline.   Psychiatric:         Behavior: Behavior normal.           Lab/Diagnostic data:  Recent labs in [facility] reviewed by me today.  and Most Recent 3 CBC's:  Recent Labs   Lab Test " 05/07/24 0617 05/06/24  0703 05/05/24 0727 05/03/24  0716 05/02/24 0717 05/01/24 2219   WBC  --   --  12.7*  --  8.6 9.5   HGB 7.8* 7.8* 9.1*   < > 9.1* 8.2*   MCV  --   --  97  --  103* 103*   PLT  --  145* 176  --  194 202    < > = values in this interval not displayed.     Most Recent 3 BMP's:  Recent Labs   Lab Test 05/06/24  0703 05/05/24  0727 05/04/24  0756 05/03/24  1813 05/02/24  0717 05/01/24  2219 08/30/23  0806   NA  --   --   --   --  137 135 137   POTASSIUM  --   --   --   --  3.6 3.5 3.8   CHLORIDE  --   --   --   --  102 101 103   CO2  --   --   --   --  24 25 25   BUN  --   --   --   --  16.4 17.0 13.7   CR 0.51  --   --  0.47* 0.48* 0.75 0.49*   ANIONGAP  --   --   --   --  11 9 9   DAVE  --   --   --   --  9.1 9.3 9.5   GLC  --  102* 119*  --  97 156* 85     Most Recent 2 LFT's:  Recent Labs   Lab Test 01/24/23  0644 06/22/22  1000   AST 14 9   ALT 8* <9   ALKPHOS 77 57   BILITOTAL 1.1 0.9     Most Recent 3 Hemoglobins:  Recent Labs   Lab Test 05/07/24 0617 05/06/24 0703 05/05/24 0727   HGB 7.8* 7.8* 9.1*     7-Day Micro Results       Collected Updated Procedure Result Status      05/09/2024 0503 05/09/2024 0659 Quantiferon TB Gold Plus Grey Tube [[id number]]   Blood from Arm, Left    In process Component Value   No component results            05/09/2024 0503 05/09/2024 0659 Quantiferon TB Gold Plus Green Tube [[id number]]   Blood from Arm, Left    In process Component Value   No component results            05/09/2024 0503 05/09/2024 0659 Quantiferon TB Gold Plus Yellow Tube [[id number]]   Blood from Arm, Left    In process Component Value   No component results            05/09/2024 0503 05/09/2024 0659 Quantiferon TB Gold Plus Purple Tube [[id number]]   Blood from Arm, Left    In process Component Value   No component results            05/03/2024 1636 05/05/2024 0807 Urine Culture [[id number]]   Urine, Almanza Catheter    Final result Component Value   Culture 50,000-100,000  CFU/mL Mixture of urogenital jorge                     Most Recent TSH and T4:  Recent Labs   Lab Test 12/10/19  0848   TSH 0.79     Most Recent Hemoglobin A1c:  Recent Labs   Lab Test 01/13/17  1020   A1C 5.3     Most Recent Urinalysis:  Recent Labs   Lab Test 05/03/24  1636 05/06/20  1035 04/07/20  1400   COLOR Yellow   < > Yellow   APPEARANCE Cloudy*   < > Cloudy*   URINEGLC Negative   < > Negative   URINEBILI Negative   < > Negative   URINEKETONE 60*   < > Negative   SG 1.022   < > 1.013   UBLD Small*   < > Moderate*   URINEPH 5.5   < > 7.5   PROTEIN 30*   < > 30 mg/dL*   UROBILINOGEN  --   --  <2.0 E.U./dL   NITRITE Negative   < > Negative   LEUKEST Large*   < > Large*   RBCU 46*   < > >100*   WBCU >182*   < > >100*    < > = values in this interval not displayed.     Most Recent ESR & CRP:  Recent Labs   Lab Test 01/31/23  0728 01/26/23  1131 05/11/20  0717   CRP  --   --  21.5*   CRPI 13.74*   < >  --     < > = values in this interval not displayed.     Most Recent Anemia Panel:  Recent Labs   Lab Test 05/07/24  0617 05/06/24  0703 05/05/24  0727 05/01/24  2219 09/27/23  0700 01/23/23  1817 01/04/23  1301   WBC  --   --  12.7*   < > 4.7   < > 7.4   HGB 7.8* 7.8* 9.1*   < > 10.0*   < > 9.6*   HCT  --   --  26.5*   < > 29.6*   < > 30.9*   MCV  --   --  97   < > 100   < > 100   PLT  --  145* 176   < > 229   < > 244   IRON  --   --   --   --  77  --  88   IRONSAT  --   --   --   --  36  --  37   RETICABSCT  --   --   --   --  0.049  --   --    RETP  --   --   --   --  1.7  --   --    FEB  --   --   --   --  213*  --  238*   CHINYERE  --   --   --   --   --   --  339*   B12  --   --   --   --  953  --  460   FOLIC  --   --   --   --  31.4  --  8.2    < > = values in this interval not displayed.     CXR; IMPRESSION: Patient is rotated to the right. Shallow inspiration. Cardiac silhouette size is within normal limits. Linear fibrotic changes versus subsegmental atelectasis within the left mid to lower lung. Visualized  lungs are otherwise clear. Mild   calcified atherosclerotic changes at the aortic arch.    CT Ankle: IMPRESSION:  1.  Acute comminuted fractures of the distal tibial and fibular  diaphyses with mild displacement.  2.  Slight widening of the tibiotalar joint medially may reflect  cartilage thinning although cannot exclude a ligamentous injury.  3.  Other ancillary findings as described above.    ASSESSMENT/PLAN:  Fall  Acute comminuted distal left tibia/fibular fracture status post ORIF 5/3/2024  Chronic pain:   - Adjust Tylenol to scheduled  -Continue as needed oxycodone and methocarbamol.  Discontinue hydroxyzine  - NWB x 4-5 weeks  - Aspirin for DVT prophylaxis  - PT/OT/SW  - Continue vitamin D supplementation  - Follow-up with orthopedics in 2 weeks    Hypoxia  Cough: Chest x-ray negative for infiltrate.  No previous diagnosis of lung disease.  Previous smoker  - Continue Mucinex and Tessalon  - Add albuterol nebs every 4 hours as needed  - CBC 5/13  - Monitor  - Encourage I-S, OOB activity as able    Acute on chronic anemia: Baseline hemoglobin 9.  Jordi 6.4 and received 1 unit PRBCs.  Discharge hemoglobin 7.8  - CBC 5/13    Hypertension:  - Continue Coreg, lisinopril, Isordil, amlodipine and clonidine      GERD:  - Continue PPI    MDD  Anxiety  -Continue Zoloft        This note was completed in part using Dragon voice recognition software. Although reviewed after completion, some word and grammatical errors may occur.      Electronically signed by:  Olivia Melara PA-C                    Sincerely,        Olivia Melara PA-C

## 2024-05-09 NOTE — LETTER
5/9/2024        RE: Daya Ignacio  [address]  [facility] [address]        [facility]    PRIMARY CARE PROVIDER AND CLINIC:  [facility] [address] [address] / [place]  Chief Complaint   Patient presents with    Hospital F/U      [place] Medical Record Number:  [id number]  Place of Service where encounter took place:  [facility] ([facility]) [[id number]]    Daya Ignacio  is a 89 year old  (10/11/1930), {fgsinitialoption:588415}.   HPI:    ***    CODE STATUS/ADVANCE DIRECTIVES DISCUSSION:  Prior  {CODE STATUS:188240}  ALLERGIES:   Allergies   Allergen Reactions    Atorvastatin Muscle Pain (Myalgia)    Macrobid [Nitrofurantoin Anhydrous] Other (See Comments)     Body aches    Nitrofurantoin Muscle Pain (Myalgia) and Unknown     Body aches      Penicillins Unknown    Seasonal Allergies Unknown    Sulfa Antibiotics Other (See Comments)     Tolerated Bactrim May 2019  Entire family has allergy to Sulfa    Sulfasalazine Unknown      PAST MEDICAL HISTORY:   Past Medical History:   Diagnosis Date    Anemia     Anxiety     Arthritis     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 (H28) 4/16/2014    Closed fracture of multiple pubic rami, left, initial encounter (H) 10/6/2021    Closed fracture of proximal end of right tibia and fibula 8/28/2019    Depression     h/o Clostridium difficile colitis 06/2019    Hip fracture (H) 2/28/2011    History of blood transfusion     Hypertension     Kidney stone     Lumbar compression fracture (H)     Macular degeneration (senile) of retina, unspecified     Mild cognitive impairment     Mumps     NSTEMI (non-ST elevated myocardial infarction) (H) 5/28/2017    Other chronic pain     low back    Recurrent UTI     Sepsis secondary to UTI (H) 7/29/2019    Upper GI bleed 06/2019    Urinary tract infection due to extended-spectrum beta lactamase (ESBL)-producing Klebsiella 05/2019    resistant to Bactrim       PAST SURGICAL HISTORY:   has a past surgical history that includes Esophagoscopy, gastroscopy, duodenoscopy (EGD), combined (N/A, 6/11/2019); Cystoscopy (N/A, 8/1/2019); Hip replacement NOS (Right); Hip Pinning procedure (Left); Tonsillectomy, adenoidectomy, combined; Cataract removal NOS (Bilateral); Open reduction internal fixation tibial plateau (Right, 8/28/2019); Combined Cystoscopy, Insert Stent Ureter(s) (Left, 5/6/2020); Extracorporeal shock wave lithotripsy (ESWL) (Left, 5/28/2020); Laser holmium lithotripsy ureter(s), insert stent, combined (Left, 10/30/2020); Open reduction internal fixation ankle (Left, 5/3/2024); and Open reduction internal fixation rodding intramedullary tibia (Left, 5/3/2024).  FAMILY HISTORY: family history includes Alzheimer Disease in her mother; Cardiovascular in her father; Neurologic Disorder (age of onset: 83) in her brother.  SOCIAL HISTORY:   reports that she has quit smoking. Her smoking use included cigarettes. She has never used smokeless tobacco. She reports that she does not drink alcohol and does not use drugs.  Patient's living condition: {LIVES WITH (NURSING HOME):757391}    Post Discharge Medication Reconciliation Status:   MED REC REQUIRED{TIP  Click the link below to document or use med rec list, use list to pull in response :686929}  Post Medication Reconciliation Status: {MED REC LIST:531007}       Current Outpatient Medications   Medication Sig Dispense Refill    acetaminophen (TYLENOL) 325 MG tablet Take 2 tablets (650 mg) by mouth every 4 hours as needed for other (For optimal non-opioid multimodal pain management to improve pain control.) 75 tablet 0    amLODIPine (NORVASC) 10 MG tablet Take 10 mg by mouth daily      aspirin (ASA) 325 MG tablet Take 1 tablet (325 mg) by mouth daily 42 tablet 0    benzonatate (TESSALON) 100 MG capsule Take 1 capsule (100 mg) by mouth 3 times daily as needed for cough      carbamide peroxide (DEBROX) 6.5 % otic solution  "Place 5 drops into both ears 2 times daily      carvedilol (COREG) 12.5 MG tablet Take 12.5 mg by mouth 2 times daily (with meals)      Cholecalciferol (VITAMIN D3) 2000 UNITS CAPS Take 2,000 Units by mouth daily       cloNIDine (CATAPRES) 0.1 MG tablet Take 0.1 mg by mouth at bedtime      cyanocobalamin (VITAMIN B-12) 1000 MCG tablet Take 1,000 mcg by mouth daily      Ferrous Gluconate 324 (37.5 Fe) MG TABS Take 1 tablet by mouth daily      guaiFENesin (MUCINEX) 600 MG 12 hr tablet Take 1 tablet (600 mg) by mouth 2 times daily      hydrOXYzine HCl (ATARAX) 10 MG tablet Take 1 tablet (10 mg) by mouth every 6 hours as needed for other (adjuvant pain) 30 tablet 0    isosorbide dinitrate (ISORDIL) 10 MG tablet Take 1 tablet (10 mg) by mouth 3 times daily 270 tablet 3    lisinopril (ZESTRIL) 30 MG tablet Take 30 mg by mouth daily      methocarbamol (ROBAXIN) 500 MG tablet Take 1 tablet (500 mg) by mouth every 6 hours as needed for muscle spasms 30 tablet 0    Multiple Vitamins-Minerals (PRESERVISION AREDS) CAPS Take 1 capsule by mouth 2 times daily       nystatin (MYCOSTATIN) 562851 UNIT/GM external cream Apply topically 2 times daily as needed (rash)      omeprazole (PRILOSEC) 20 MG DR capsule Take 20 mg by mouth daily       ondansetron (ZOFRAN) 4 MG tablet Take 4 mg by mouth every 8 hours as needed for nausea      oxyCODONE (ROXICODONE) 5 MG tablet Take 0.5 tablets (2.5 mg) by mouth every 4 hours as needed for moderate pain 12 tablet 0    polyethylene glycol (MIRALAX/GLYCOLAX) packet Take 17 g by mouth daily as needed for constipation      senna-docusate (SENOKOT-S/PERICOLACE) 8.6-50 MG tablet Take 1 tablet by mouth 2 times daily as needed for constipation 30 tablet 0    sertraline (ZOLOFT) 100 MG tablet Take 100 mg by mouth daily       No current facility-administered medications for this visit.       ROS:  {ROS FGS:760211}    Vitals:  /62   Pulse 67   Temp 97.8  F (36.6  C)   Resp 16   Ht 1.549 m (5' 1\")  "  Wt 51.6 kg (113 lb 12.8 oz)   SpO2 94%   BMI 21.50 kg/m    Exam:  {Nursing home physical exam :190665}    Lab/Diagnostic data:  {fgslab:546901}    ASSESSMENT/PLAN:    {S DX2:801733}    Orders:  {fgsorders:615008}  ***    Total time spent with patient visit at the skilled nursing facility was {1/2/3/4/5:122177} including {1/2/3/4/5:809897}.     Electronically signed by:  Eliza Mena MA ***                    Sincerely,        Olivia Melara PA-C

## 2024-05-10 ENCOUNTER — DOCUMENTATION ONLY (OUTPATIENT)
Dept: OTHER | Facility: CLINIC | Age: 89
End: 2024-05-10
Payer: COMMERCIAL

## 2024-05-10 ENCOUNTER — LAB REQUISITION (OUTPATIENT)
Dept: LAB | Facility: CLINIC | Age: 89
End: 2024-05-10
Payer: COMMERCIAL

## 2024-05-10 DIAGNOSIS — D64.9 ANEMIA, UNSPECIFIED: ICD-10-CM

## 2024-05-10 LAB
GAMMA INTERFERON BACKGROUND BLD IA-ACNC: 0.01 IU/ML
M TB IFN-G BLD-IMP: NEGATIVE
M TB IFN-G CD4+ BCKGRND COR BLD-ACNC: 9.99 IU/ML
MITOGEN IGNF BCKGRD COR BLD-ACNC: -0.01 IU/ML
MITOGEN IGNF BCKGRD COR BLD-ACNC: 0 IU/ML
QUANTIFERON MITOGEN: 10 IU/ML
QUANTIFERON NIL TUBE: 0.01 IU/ML
QUANTIFERON TB1 TUBE: 0 IU/ML
QUANTIFERON TB2 TUBE: 0.01

## 2024-05-13 ENCOUNTER — TRANSITIONAL CARE UNIT VISIT (OUTPATIENT)
Dept: GERIATRICS | Facility: CLINIC | Age: 89
End: 2024-05-13
Payer: COMMERCIAL

## 2024-05-13 VITALS
WEIGHT: 117 LBS | OXYGEN SATURATION: 92 % | DIASTOLIC BLOOD PRESSURE: 66 MMHG | HEART RATE: 64 BPM | HEIGHT: 64 IN | RESPIRATION RATE: 17 BRPM | SYSTOLIC BLOOD PRESSURE: 124 MMHG | TEMPERATURE: 98.2 F | BODY MASS INDEX: 19.97 KG/M2

## 2024-05-13 DIAGNOSIS — I10 BENIGN ESSENTIAL HYPERTENSION: ICD-10-CM

## 2024-05-13 DIAGNOSIS — S82.302D CLOSED FRACTURE OF DISTAL END OF LEFT FIBULA AND TIBIA WITH ROUTINE HEALING, SUBSEQUENT ENCOUNTER: ICD-10-CM

## 2024-05-13 DIAGNOSIS — J96.01 ACUTE RESPIRATORY FAILURE WITH HYPOXIA (H): ICD-10-CM

## 2024-05-13 DIAGNOSIS — S82.832D CLOSED FRACTURE OF DISTAL END OF LEFT FIBULA AND TIBIA WITH ROUTINE HEALING, SUBSEQUENT ENCOUNTER: ICD-10-CM

## 2024-05-13 DIAGNOSIS — N18.31 STAGE 3A CHRONIC KIDNEY DISEASE (H): ICD-10-CM

## 2024-05-13 DIAGNOSIS — J90 PLEURAL EFFUSION: Primary | ICD-10-CM

## 2024-05-13 DIAGNOSIS — R09.02 HYPOXIA: ICD-10-CM

## 2024-05-13 LAB
ERYTHROCYTE [DISTWIDTH] IN BLOOD BY AUTOMATED COUNT: 16.2 % (ref 10–15)
HCT VFR BLD AUTO: 23.8 % (ref 35–47)
HGB BLD-MCNC: 7.9 G/DL (ref 11.7–15.7)
MCH RBC QN AUTO: 32.9 PG (ref 26.5–33)
MCHC RBC AUTO-ENTMCNC: 33.2 G/DL (ref 31.5–36.5)
MCV RBC AUTO: 99 FL (ref 78–100)
PLATELET # BLD AUTO: 233 10E3/UL (ref 150–450)
RBC # BLD AUTO: 2.4 10E6/UL (ref 3.8–5.2)
WBC # BLD AUTO: 5.2 10E3/UL (ref 4–11)

## 2024-05-13 PROCEDURE — P9604 ONE-WAY ALLOW PRORATED TRIP: HCPCS | Mod: ORL | Performed by: PHYSICIAN ASSISTANT

## 2024-05-13 PROCEDURE — 36415 COLL VENOUS BLD VENIPUNCTURE: CPT | Mod: ORL | Performed by: PHYSICIAN ASSISTANT

## 2024-05-13 PROCEDURE — 99309 SBSQ NF CARE MODERATE MDM 30: CPT | Performed by: PHYSICIAN ASSISTANT

## 2024-05-13 PROCEDURE — 85027 COMPLETE CBC AUTOMATED: CPT | Mod: ORL | Performed by: PHYSICIAN ASSISTANT

## 2024-05-13 RX ORDER — FUROSEMIDE 20 MG
20 TABLET ORAL DAILY
Status: SHIPPED
Start: 2024-05-13 | End: 2024-06-04

## 2024-05-13 RX ORDER — POTASSIUM CHLORIDE 750 MG/1
10 TABLET, EXTENDED RELEASE ORAL 2 TIMES DAILY
Status: SHIPPED
Start: 2024-05-13 | End: 2024-05-20 | Stop reason: DRUGHIGH

## 2024-05-13 NOTE — LETTER
"    5/13/2024        RE: Daya Ignacio  [address]  [facility] [address]          Chief Complaint   Patient presents with     Nursing Home Acute       HPI:  Daya Ignacio is a 89 year old  (10/11/1930), who is being seen today for an episodic care visit at: [facility] ([facility]) [[id number]].     Brief summary: Daya Ignacio is a 89-year-old female with a past medical history of hypertension, chronic back pain, mild cognitive impairment, GERD, previous C. difficile, history of right tib-fib fracture who was recently hospitalized at [facility].  Presented from her facility with a fall.  Found to have a distal tib-fib fracture.  Orthopedics consulted underwent surgical ORIF.  Postoperative course complicated by hypoxia with cough.  Chest x-ray negative.  Treated supportively.  Assessed by therapies and discharged to [facility]      Today's concern is: Spoke with RN- Still on O2.  2 L.  Intermittent cough.     Daya is evaluated in her room.  Confirms ongoing, nagging cough denies shortness of breath.  Pain in left lower extremity controlled primarily with Tylenol.  Using minimal oxycodone no constipation.    Review of nursing home EMR:-140, Wt 117,    Allergies, and PMH/PSH reviewed in [employer] today.    REVIEW OF SYSTEMS:  4 point ROS including Respiratory, CV, GI and , other than that noted in the HPI,  is negative    Objective:   /66   Pulse 64   Temp 98.2  F (36.8  C)   Resp 17   Ht 1.626 m (5' 4\")   Wt 53.1 kg (117 lb)   SpO2 92%   BMI 20.08 kg/m      Physical Exam  Vitals (Facility EMR) reviewed.   Constitutional:       General: She is not in acute distress.  HENT:      Head: Normocephalic and atraumatic.      Comments: [place]     Ears:      Comments: TMs visualized bilaterally.   Eyes:      General: No scleral icterus.  Cardiovascular:      Rate and Rhythm: Normal rate and regular rhythm.      Heart sounds: No murmur heard.  Pulmonary:      Effort: Pulmonary effort is " normal.      Comments: Crackles right base  Musculoskeletal:      Comments: Splint to LLE   Skin:     General: Skin is warm and dry.      Findings: No rash.   Neurological:      Mental Status: She is alert. Mental status is at baseline.   Psychiatric:         Behavior: Behavior normal.          MED REC REQUIRED  Post Medication Reconciliation Status: discharge medications reconciled and changed, per note/orders      Recent labs in [facility] reviewed by me today.  and Most Recent 3 CBC's:  Recent Labs   Lab Test 05/13/24  0640 05/07/24  0617 05/06/24  0703 05/05/24  0727 05/03/24  0716 05/02/24  0717   WBC 5.2  --   --  12.7*  --  8.6   HGB 7.9* 7.8* 7.8* 9.1*   < > 9.1*   MCV 99  --   --  97  --  103*     --  145* 176  --  194    < > = values in this interval not displayed.     Most Recent 3 BMP's:  Recent Labs   Lab Test 05/06/24  0703 05/05/24  0727 05/04/24  0756 05/03/24  1813 05/02/24  0717 05/01/24  2219 08/30/23  0806   NA  --   --   --   --  137 135 137   POTASSIUM  --   --   --   --  3.6 3.5 3.8   CHLORIDE  --   --   --   --  102 101 103   CO2  --   --   --   --  24 25 25   BUN  --   --   --   --  16.4 17.0 13.7   CR 0.51  --   --  0.47* 0.48* 0.75 0.49*   ANIONGAP  --   --   --   --  11 9 9   DAVE  --   --   --   --  9.1 9.3 9.5   GLC  --  102* 119*  --  97 156* 85     Most Recent 2 LFT's:  Recent Labs   Lab Test 01/24/23  0644 06/22/22  1000   AST 14 9   ALT 8* <9   ALKPHOS 77 57   BILITOTAL 1.1 0.9           Assessment/Plan:    Hypoxia  Cough: Chest x-ray 5/1 negative for infiltrate.  No previous diagnosis of lung disease.  Previous smoker  - Continue supportive cares with nebulizer and cough suppressant  - CBC today without leukocytosis  - Encourage I-S, OOB activity as able  - Given persistent cough and crackles in right base will obtain repeat imaging    Addendum: Personally reviewed images as well as radiology read.  Evidence of mild CHF.  Right pleural effusion is new compared to x-ray 5/1  - Lasix  20 mg p.o. daily and KCl 10 mill equivalents daily  - [facility] 5/17    Fall  Acute comminuted distal left tibia/fibular fracture status post ORIF 5/3/2024  Chronic pain:   - Continue scheduled Tylenol  -Continue as needed oxycodone and methocarbamol.  Discontinue hydroxyzine  - NWB x 4-5 weeks  - Aspirin for DVT prophylaxis  - PT/OT/SW  - Continue vitamin D supplementation  - Follow-up with orthopedics 5/17      Acute on chronic anemia: Baseline hemoglobin 9.  Jordi 6.4 and received 1 unit PRBCs.  Discharge hemoglobin 7.8  - CBC today stable at 7.9  - Monitor periodically    Hypertension:  - Continue Coreg, lisinopril, Isordil, amlodipine and clonidine  - Monitor BP with addition of Lasix    CKD stage IIIa: Estimated Creatinine Clearance: 57.8 mL/min (based on SCr of 0.51 mg/dL).  -[facility] 5/17    GERD:  - Continue PPI    MDD  Anxiety  -Continue Zoloft      Orders:  As above      Electronically signed by: Olivia Melara PA-C       Sincerely,        Olivia Melara PA-C

## 2024-05-13 NOTE — PROGRESS NOTES
"  Chief Complaint   Patient presents with    Nursing Home Acute       HPI:  Daya Ignacio is a 89 year old  (10/11/1930), who is being seen today for an episodic care visit at: [facility] ([facility]) [[id number]].     Brief summary: Daya Ignacio is a 89-year-old female with a past medical history of hypertension, chronic back pain, mild cognitive impairment, GERD, previous C. difficile, history of right tib-fib fracture who was recently hospitalized at [facility].  Presented from her facility with a fall.  Found to have a distal tib-fib fracture.  Orthopedics consulted underwent surgical ORIF.  Postoperative course complicated by hypoxia with cough.  Chest x-ray negative.  Treated supportively.  Assessed by therapies and discharged to [facility]      Today's concern is: Spoke with RN- Still on O2.  2 L.  Intermittent cough.     Daya is evaluated in her room.  Confirms ongoing, nagging cough denies shortness of breath.  Pain in left lower extremity controlled primarily with Tylenol.  Using minimal oxycodone no constipation.    Review of nursing home EMR:-140, Wt 117,    Allergies, and PMH/PSH reviewed in [employer] today.    REVIEW OF SYSTEMS:  4 point ROS including Respiratory, CV, GI and , other than that noted in the HPI,  is negative    Objective:   /66   Pulse 64   Temp 98.2  F (36.8  C)   Resp 17   Ht 1.626 m (5' 4\")   Wt 53.1 kg (117 lb)   SpO2 92%   BMI 20.08 kg/m      Physical Exam  Vitals (Facility EMR) reviewed.   Constitutional:       General: She is not in acute distress.  HENT:      Head: Normocephalic and atraumatic.      Comments: [place]     Ears:      Comments: TMs visualized bilaterally.   Eyes:      General: No scleral icterus.  Cardiovascular:      Rate and Rhythm: Normal rate and regular rhythm.      Heart sounds: No murmur heard.  Pulmonary:      Effort: Pulmonary effort is normal.      Comments: Crackles right base  Musculoskeletal:      Comments: Splint to LLE   Skin:     " General: Skin is warm and dry.      Findings: No rash.   Neurological:      Mental Status: She is alert. Mental status is at baseline.   Psychiatric:         Behavior: Behavior normal.          MED REC REQUIRED  Post Medication Reconciliation Status: discharge medications reconciled and changed, per note/orders      Recent labs in [facility] reviewed by me today.  and Most Recent 3 CBC's:  Recent Labs   Lab Test 05/13/24  0640 05/07/24  0617 05/06/24  0703 05/05/24  0727 05/03/24  0716 05/02/24  0717   WBC 5.2  --   --  12.7*  --  8.6   HGB 7.9* 7.8* 7.8* 9.1*   < > 9.1*   MCV 99  --   --  97  --  103*     --  145* 176  --  194    < > = values in this interval not displayed.     Most Recent 3 BMP's:  Recent Labs   Lab Test 05/06/24  0703 05/05/24  0727 05/04/24  0756 05/03/24  1813 05/02/24  0717 05/01/24  2219 08/30/23  0806   NA  --   --   --   --  137 135 137   POTASSIUM  --   --   --   --  3.6 3.5 3.8   CHLORIDE  --   --   --   --  102 101 103   CO2  --   --   --   --  24 25 25   BUN  --   --   --   --  16.4 17.0 13.7   CR 0.51  --   --  0.47* 0.48* 0.75 0.49*   ANIONGAP  --   --   --   --  11 9 9   DAVE  --   --   --   --  9.1 9.3 9.5   GLC  --  102* 119*  --  97 156* 85     Most Recent 2 LFT's:  Recent Labs   Lab Test 01/24/23  0644 06/22/22  1000   AST 14 9   ALT 8* <9   ALKPHOS 77 57   BILITOTAL 1.1 0.9           Assessment/Plan:    Hypoxia  Cough: Chest x-ray 5/1 negative for infiltrate.  No previous diagnosis of lung disease.  Previous smoker  - Continue supportive cares with nebulizer and cough suppressant  - CBC today without leukocytosis  - Encourage I-S, OOB activity as able  - Given persistent cough and crackles in right base will obtain repeat imaging    Addendum: Personally reviewed images as well as radiology read.  Evidence of mild CHF.  Right pleural effusion is new compared to x-ray 5/1  - Lasix 20 mg p.o. daily and KCl 10 mill equivalents daily  - BMP 5/17    Fall  Acute comminuted distal left  tibia/fibular fracture status post ORIF 5/3/2024  Chronic pain:   - Continue scheduled Tylenol  -Continue as needed oxycodone and methocarbamol.  Discontinue hydroxyzine  - NWB x 4-5 weeks  - Aspirin for DVT prophylaxis  - PT/OT/SW  - Continue vitamin D supplementation  - Follow-up with orthopedics 5/17      Acute on chronic anemia: Baseline hemoglobin 9.  Jordi 6.4 and received 1 unit PRBCs.  Discharge hemoglobin 7.8  - CBC today stable at 7.9  - Monitor periodically    Hypertension:  - Continue Coreg, lisinopril, Isordil, amlodipine and clonidine  - Monitor BP with addition of Lasix    CKD stage IIIa: Estimated Creatinine Clearance: 57.8 mL/min (based on SCr of 0.51 mg/dL).  -BMP 5/17    GERD:  - Continue PPI    MDD  Anxiety  -Continue Zoloft      Orders:  As above      Electronically signed by: Olivia Melara PA-C

## 2024-05-16 ENCOUNTER — LAB REQUISITION (OUTPATIENT)
Dept: LAB | Facility: CLINIC | Age: 89
End: 2024-05-16
Payer: COMMERCIAL

## 2024-05-16 DIAGNOSIS — D64.9 ANEMIA, UNSPECIFIED: ICD-10-CM

## 2024-05-17 ENCOUNTER — TRANSITIONAL CARE UNIT VISIT (OUTPATIENT)
Dept: GERIATRICS | Facility: CLINIC | Age: 89
End: 2024-05-17
Payer: COMMERCIAL

## 2024-05-17 VITALS
RESPIRATION RATE: 17 BRPM | SYSTOLIC BLOOD PRESSURE: 91 MMHG | DIASTOLIC BLOOD PRESSURE: 60 MMHG | TEMPERATURE: 97.4 F | WEIGHT: 173.4 LBS | BODY MASS INDEX: 27.21 KG/M2 | HEART RATE: 90 BPM | HEIGHT: 67 IN | OXYGEN SATURATION: 94 %

## 2024-05-17 DIAGNOSIS — I10 BENIGN ESSENTIAL HYPERTENSION: ICD-10-CM

## 2024-05-17 DIAGNOSIS — S82.302D CLOSED FRACTURE OF DISTAL END OF LEFT FIBULA AND TIBIA WITH ROUTINE HEALING, SUBSEQUENT ENCOUNTER: ICD-10-CM

## 2024-05-17 DIAGNOSIS — R11.0 NAUSEA: Primary | ICD-10-CM

## 2024-05-17 DIAGNOSIS — N18.31 STAGE 3A CHRONIC KIDNEY DISEASE (H): ICD-10-CM

## 2024-05-17 DIAGNOSIS — S82.832D CLOSED FRACTURE OF DISTAL END OF LEFT FIBULA AND TIBIA WITH ROUTINE HEALING, SUBSEQUENT ENCOUNTER: ICD-10-CM

## 2024-05-17 DIAGNOSIS — R09.02 HYPOXIA: ICD-10-CM

## 2024-05-17 DIAGNOSIS — J90 PLEURAL EFFUSION ON RIGHT: ICD-10-CM

## 2024-05-17 PROCEDURE — 80048 BASIC METABOLIC PNL TOTAL CA: CPT | Mod: ORL | Performed by: PHYSICIAN ASSISTANT

## 2024-05-17 PROCEDURE — 99309 SBSQ NF CARE MODERATE MDM 30: CPT | Performed by: PHYSICIAN ASSISTANT

## 2024-05-17 PROCEDURE — P9604 ONE-WAY ALLOW PRORATED TRIP: HCPCS | Mod: ORL | Performed by: PHYSICIAN ASSISTANT

## 2024-05-17 PROCEDURE — 36415 COLL VENOUS BLD VENIPUNCTURE: CPT | Mod: ORL | Performed by: PHYSICIAN ASSISTANT

## 2024-05-17 RX ORDER — ONDANSETRON 4 MG/1
4 TABLET, FILM COATED ORAL EVERY 6 HOURS PRN
Status: SHIPPED
Start: 2024-05-17 | End: 2024-05-20 | Stop reason: DRUGHIGH

## 2024-05-17 RX ORDER — OXYCODONE HYDROCHLORIDE 5 MG/1
5 TABLET ORAL EVERY 4 HOURS PRN
Qty: 15 TABLET | Refills: 0 | Status: SHIPPED | OUTPATIENT
Start: 2024-05-17 | End: 2024-05-23

## 2024-05-17 NOTE — LETTER
"    5/17/2024        RE: Daya Ignacio  [address]  [facility] [address]          Chief Complaint   Patient presents with     Nursing Home Acute       HPI:  Daya Ignacio is a 89 year old  (10/11/1930), who is being seen today for an episodic care visit at: [facility] ([facility]) [[id number]].     Brief summary:  Daya Ignacio is a 89-year-old female with a past medical history of hypertension, chronic back pain, mild cognitive impairment, GERD, previous C. difficile, history of right tib-fib fracture who was recently hospitalized at [facility].  Presented from her facility with a fall.  Found to have a distal tib-fib fracture.  Orthopedics consulted underwent surgical ORIF.  Postoperative course complicated by hypoxia with cough.  Chest x-ray negative.  Treated supportively.  Assessed by therapies and discharged to [facility]      Today's concern is: Spoke with RN and Aide. Not feeling well this am. Declined to go to Ortho appt. Feeling nauseated and asking to go back to bed. Aide states was up much longer than typical for breakfast and appt.  Spoke with PT, feel pain limiting therapy progress    Daya is evaluated sitting WC. C/o nausea. No abdominal pain. Nursing documenting regular BMs. C/o pain in leg and back. Cough a bit better. Still on O2.      Later re-evaluated after aide had assisted her back to bed x 15 min. No longer nauseated. Comfortable.        Review of nursing home EMR:-138, Wt 117.8, BG       Allergies, and PMH/PSH reviewed in [employer] today.    REVIEW OF SYSTEMS:  4 point ROS including Respiratory, CV, GI and , other than that noted in the HPI,  is negative    Objective:   BP 91/60   Pulse 90   Temp 97.4  F (36.3  C)   Resp 17   Ht 1.702 m (5' 7\")   Wt 78.7 kg (173 lb 6.4 oz)   SpO2 94%   BMI 27.16 kg/m      Physical Exam  Vitals (Facility EMR) reviewed.   Constitutional:       General: She is not in acute distress.  HENT:      Head: Normocephalic and atraumatic. "   Eyes:      General: No scleral icterus.  Cardiovascular:      Rate and Rhythm: Normal rate and regular rhythm.   Pulmonary:      Effort: Pulmonary effort is normal.      Breath sounds: No wheezing.      Comments: Crackles right base  Musculoskeletal:      Right lower leg: No edema.      Left lower leg: No edema.   Skin:     General: Skin is warm and dry.      Findings: No rash.   Neurological:      Mental Status: She is alert. Mental status is at baseline.   Psychiatric:         Behavior: Behavior normal.          MED REC REQUIRED  Post Medication Reconciliation Status: discharge medications reconciled and changed, per note/orders      Recent labs in [facility] reviewed by me today.  and Most Recent 3 CBC's:  Recent Labs   Lab Test 05/13/24  0640 05/07/24  0617 05/06/24  0703 05/05/24  0727 05/03/24  0716 05/02/24  0717   WBC 5.2  --   --  12.7*  --  8.6   HGB 7.9* 7.8* 7.8* 9.1*   < > 9.1*   MCV 99  --   --  97  --  103*     --  145* 176  --  194    < > = values in this interval not displayed.     Most Recent 3 BMP's:  Recent Labs   Lab Test 05/17/24  0648 05/06/24  0703 05/05/24  0727 05/04/24  0756 05/03/24  1813 05/02/24  0717 05/01/24  2219   *  --   --   --   --  137 135   POTASSIUM 4.1  --   --   --   --  3.6 3.5   CHLORIDE 100  --   --   --   --  102 101   CO2 26  --   --   --   --  24 25   BUN 15.8  --   --   --   --  16.4 17.0   CR 0.48* 0.51  --   --  0.47* 0.48* 0.75   ANIONGAP 8  --   --   --   --  11 9   DAVE 9.1  --   --   --   --  9.1 9.3   GLC 92  --  102* 119*  --  97 156*     Most Recent 2 LFT's:  Recent Labs   Lab Test 01/24/23  0644 06/22/22  1000   AST 14 9   ALT 8* <9   ALKPHOS 77 57   BILITOTAL 1.1 0.9       Assessment/Plan:  Hypoxia  Right Pleural Effusion  Cough: Present since hospital admission. CXR during hospitalization negative. Repeat CXR 5/13 at TCU with pleural effusion and concern for volume overload and started on low dose Lasix   - Notes cough improved but still on O2.  Continue lasix for now  - Continue supportive care  - Encourage OOB activity and IS    Nausea,improved: Noted this am. Declined to go to Ortho. No abdominal pain or constipation  - Monitor, improved after laying down in bed  - Zofran available as needed    Fall  Acute comminuted distal left tibia/fibular fracture status post ORIF 5/3/2024  Chronic pain:   - Continue scheduled Tylenol  - Increase Oxycodone to 5 mg q 4 prn due to reports of poor pain control per therapy  - Continue prn Methocarbamol  - NWB x 4-5 weeks  - Aspirin for DVT prophylaxis  - PT/OT/SW  - Continue vitamin D supplementation  - Appt coordinator working on rescheduling Ortho apt      Acute on chronic anemia: Baseline hemoglobin 9.  Jordi 6.4 and received 1 unit PRBCs.  Discharge hemoglobin 7.8  - CBC 5/13 stable at 7.9  - Monitor periodically    Hypertension:  - Continue Coreg, lisinopril, Isordil, amlodipine and clonidine  - Continue Lasix    CKD stage IIIa: Estimated Creatinine Clearance: 79.1 mL/min (A) (based on SCr of 0.48 mg/dL (L)).  -Cr stable with Lasix  - Monitor periodically    GERD:  - Continue PPI    MDD  Anxiety  -Continue Zoloft    Orders:  As above      Electronically signed by: Olivia Melara PA-C       Sincerely,        Olivia Melara PA-C

## 2024-05-17 NOTE — PROGRESS NOTES
"  Chief Complaint   Patient presents with    Nursing Home Acute       HPI:  Daya Ignacio is a 89 year old  (10/11/1930), who is being seen today for an episodic care visit at: [facility] [[id number]].     Brief summary:  Daya Ignacio is a 89-year-old female with a past medical history of hypertension, chronic back pain, mild cognitive impairment, GERD, previous C. difficile, history of right tib-fib fracture who was recently hospitalized at [facility].  Presented from her facility with a fall.  Found to have a distal tib-fib fracture.  Orthopedics consulted underwent surgical ORIF.  Postoperative course complicated by hypoxia with cough.  Chest x-ray negative.  Treated supportively.  Assessed by therapies and discharged to [facility]      Today's concern is: Spoke with RN and Aide. Not feeling well this am. Declined to go to Ortho appt. Feeling nauseated and asking to go back to bed. Aide states was up much longer than typical for breakfast and appt.  Spoke with PT, feel pain limiting therapy progress    Daya is evaluated sitting WC. C/o nausea. No abdominal pain. Nursing documenting regular BMs. C/o pain in leg and back. Cough a bit better. Still on O2.      Later re-evaluated after aide had assisted her back to bed x 15 min. No longer nauseated. Comfortable.        Review of nursing home EMR:-138, Wt 117.8, BG       Allergies, and PMH/PSH reviewed in [employer] today.    REVIEW OF SYSTEMS:  4 point ROS including Respiratory, CV, GI and , other than that noted in the HPI,  is negative    Objective:   BP 91/60   Pulse 90   Temp 97.4  F (36.3  C)   Resp 17   Ht 1.702 m (5' 7\")   Wt 78.7 kg (173 lb 6.4 oz)   SpO2 94%   BMI 27.16 kg/m      Physical Exam  Vitals (Facility EMR) reviewed.   Constitutional:       General: She is not in acute distress.  HENT:      Head: Normocephalic and atraumatic.   Eyes:      General: No scleral icterus.  Cardiovascular:      Rate and Rhythm: Normal rate and " regular rhythm.   Pulmonary:      Effort: Pulmonary effort is normal.      Breath sounds: No wheezing.      Comments: Crackles right base  Musculoskeletal:      Right lower leg: No edema.      Left lower leg: No edema.   Skin:     General: Skin is warm and dry.      Findings: No rash.   Neurological:      Mental Status: She is alert. Mental status is at baseline.   Psychiatric:         Behavior: Behavior normal.          MED REC REQUIRED  Post Medication Reconciliation Status: discharge medications reconciled and changed, per note/orders      Recent labs in [facility] reviewed by me today.  and Most Recent 3 CBC's:  Recent Labs   Lab Test 05/13/24  0640 05/07/24  0617 05/06/24  0703 05/05/24  0727 05/03/24  0716 05/02/24  0717   WBC 5.2  --   --  12.7*  --  8.6   HGB 7.9* 7.8* 7.8* 9.1*   < > 9.1*   MCV 99  --   --  97  --  103*     --  145* 176  --  194    < > = values in this interval not displayed.     Most Recent 3 BMP's:  Recent Labs   Lab Test 05/17/24  0648 05/06/24  0703 05/05/24  0727 05/04/24  0756 05/03/24  1813 05/02/24  0717 05/01/24  2219   *  --   --   --   --  137 135   POTASSIUM 4.1  --   --   --   --  3.6 3.5   CHLORIDE 100  --   --   --   --  102 101   CO2 26  --   --   --   --  24 25   BUN 15.8  --   --   --   --  16.4 17.0   CR 0.48* 0.51  --   --  0.47* 0.48* 0.75   ANIONGAP 8  --   --   --   --  11 9   DAVE 9.1  --   --   --   --  9.1 9.3   GLC 92  --  102* 119*  --  97 156*     Most Recent 2 LFT's:  Recent Labs   Lab Test 01/24/23  0644 06/22/22  1000   AST 14 9   ALT 8* <9   ALKPHOS 77 57   BILITOTAL 1.1 0.9       Assessment/Plan:  Hypoxia  Right Pleural Effusion  Cough: Present since hospital admission. CXR during hospitalization negative. Repeat CXR 5/13 at TCU with pleural effusion and concern for volume overload and started on low dose Lasix   - Notes cough improved but still on O2. Continue lasix for now  - Continue supportive care  - Encourage OOB activity and  IS    Nausea,improved: Noted this am. Declined to go to Ortho. No abdominal pain or constipation  - Monitor, improved after laying down in bed  - Zofran available as needed    Fall  Acute comminuted distal left tibia/fibular fracture status post ORIF 5/3/2024  Chronic pain:   - Continue scheduled Tylenol  - Increase Oxycodone to 5 mg q 4 prn due to reports of poor pain control per therapy  - Continue prn Methocarbamol  - NWB x 4-5 weeks  - Aspirin for DVT prophylaxis  - PT/OT/SW  - Continue vitamin D supplementation  - Appt coordinator working on rescheduling Ortho apt      Acute on chronic anemia: Baseline hemoglobin 9.  Jordi 6.4 and received 1 unit PRBCs.  Discharge hemoglobin 7.8  - CBC 5/13 stable at 7.9  - Monitor periodically    Hypertension:  - Continue Coreg, lisinopril, Isordil, amlodipine and clonidine  - Continue Lasix    CKD stage IIIa: Estimated Creatinine Clearance: 79.1 mL/min (A) (based on SCr of 0.48 mg/dL (L)).  -Cr stable with Lasix  - Monitor periodically    GERD:  - Continue PPI    MDD  Anxiety  -Continue Zoloft    Orders:  As above      Electronically signed by: Olivia Melara PA-C

## 2024-05-18 LAB
ANION GAP SERPL CALCULATED.3IONS-SCNC: 8 MMOL/L (ref 7–15)
BUN SERPL-MCNC: 15.8 MG/DL (ref 8–23)
CALCIUM SERPL-MCNC: 9.1 MG/DL (ref 8.2–9.6)
CHLORIDE SERPL-SCNC: 100 MMOL/L (ref 98–107)
CREAT SERPL-MCNC: 0.48 MG/DL (ref 0.51–0.95)
DEPRECATED HCO3 PLAS-SCNC: 26 MMOL/L (ref 22–29)
EGFRCR SERPLBLD CKD-EPI 2021: 88 ML/MIN/1.73M2
GLUCOSE SERPL-MCNC: 92 MG/DL (ref 70–99)
POTASSIUM SERPL-SCNC: 4.1 MMOL/L (ref 3.4–5.3)
SODIUM SERPL-SCNC: 134 MMOL/L (ref 135–145)

## 2024-05-20 ENCOUNTER — TRANSITIONAL CARE UNIT VISIT (OUTPATIENT)
Dept: GERIATRICS | Facility: CLINIC | Age: 89
End: 2024-05-20
Payer: COMMERCIAL

## 2024-05-20 VITALS
SYSTOLIC BLOOD PRESSURE: 142 MMHG | TEMPERATURE: 97.5 F | DIASTOLIC BLOOD PRESSURE: 69 MMHG | HEIGHT: 64 IN | WEIGHT: 117.8 LBS | BODY MASS INDEX: 20.11 KG/M2 | RESPIRATION RATE: 18 BRPM | HEART RATE: 62 BPM | OXYGEN SATURATION: 98 %
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
## 2024-05-20 DIAGNOSIS — E87.1 HYPONATREMIA: ICD-10-CM

## 2024-05-20 DIAGNOSIS — R53.81 PHYSICAL DECONDITIONING: ICD-10-CM

## 2024-05-20 DIAGNOSIS — J96.01 ACUTE RESPIRATORY FAILURE WITH HYPOXIA (H): ICD-10-CM

## 2024-05-20 DIAGNOSIS — F41.1 GENERALIZED ANXIETY DISORDER: ICD-10-CM

## 2024-05-20 DIAGNOSIS — R41.89 COGNITIVE IMPAIRMENT: ICD-10-CM

## 2024-05-20 DIAGNOSIS — D62 ABLA (ACUTE BLOOD LOSS ANEMIA): ICD-10-CM

## 2024-05-20 DIAGNOSIS — D64.9 ANEMIA, UNSPECIFIED TYPE: ICD-10-CM

## 2024-05-20 DIAGNOSIS — M54.50 CHRONIC LOW BACK PAIN WITHOUT SCIATICA, UNSPECIFIED BACK PAIN LATERALITY: ICD-10-CM

## 2024-05-20 DIAGNOSIS — S82.302D CLOSED FRACTURE OF DISTAL END OF LEFT FIBULA AND TIBIA WITH ROUTINE HEALING, SUBSEQUENT ENCOUNTER: ICD-10-CM

## 2024-05-20 DIAGNOSIS — G89.29 CHRONIC LOW BACK PAIN WITHOUT SCIATICA, UNSPECIFIED BACK PAIN LATERALITY: ICD-10-CM

## 2024-05-20 DIAGNOSIS — I10 BENIGN ESSENTIAL HYPERTENSION: ICD-10-CM

## 2024-05-20 DIAGNOSIS — J90 PLEURAL EFFUSION ON RIGHT: ICD-10-CM

## 2024-05-20 DIAGNOSIS — S82.832D CLOSED FRACTURE OF DISTAL END OF LEFT FIBULA AND TIBIA WITH ROUTINE HEALING, SUBSEQUENT ENCOUNTER: ICD-10-CM

## 2024-05-20 PROCEDURE — 99305 1ST NF CARE MODERATE MDM 35: CPT | Performed by: INTERNAL MEDICINE

## 2024-05-20 RX ORDER — POTASSIUM CHLORIDE 750 MG/1
10 TABLET, EXTENDED RELEASE ORAL DAILY
COMMUNITY
End: 2024-06-04

## 2024-05-20 NOTE — Clinical Note
JESSICA, doing ok. Down to 1 LPM O2; encouraged further weaning by RN. Was to go to [facility] today (Tuesday) so hopefully did. If back pain not improving consider imaging given past compression fractures and her fall PTA. She did seem comfortable though yesterday.

## 2024-05-20 NOTE — LETTER
5/20/2024        RE: Daya Ignacio  [address]  [facility] [address]        [facility]  PHYSICIAN NOTE    PRIMARY CARE PROVIDER AND CLINIC:  [facility] [address] / [place]*    Chief Complaint   Patient presents with     Hospital F/U     [facility] Medical Record Number:  [id number]  Place of Service where encounter took place:  [facility] [[id number]]    Daya Ignacio is a 89 year old (10/11/1930), admitted to the above facility from  [facility]. Hospital stay 5/1/24 through 5/8/24. Admitted to this facility for  rehab, medical management, and nursing care.     HPI:    HPI information obtained from: facility chart records, facility staff, patient report, and [facility] chart review.     Brief summary of hospital course: Daya Ignacio is a 89yoF with h/o cognitive impairment residing at [facility], osteoporosis with prior compression fractures and chronic low back pain no longer on chronic opiates, HTN and anxiety who had an unwitnessed fall in her apartment. Details not entirely clear per notes how fall happened but not thought to be syncopal. As a result of the fall she unfortunately sustained left distal tibia/fibula fractures s/p ORIF with intramedullary nailing.  mg daily for DVT prophy and placed in short leg splint. To be NWB until ortho follow up and then consider transitioning to a boot pending xrays. Postop did need blood transfusion for acute on chronic anemia. Also was unable to wean off of O2 postop thought to have component of atelectasis. Discharged to TCU for rehab/cares remaining on O2 therapy.    Updates on status since skilled nursing admission: Since arrival to TCU, still has required O2 therapy. Found to have pleural effusion and Lasik/K started and staff continue to work on weaning down on O2. Also due to poor tolerance to therapy with pain, last  "week the PRN Oxycodone dose was increased from 2.5 mg to 5 mg dosing. Last week missed ortho appointment as just felt \"unwell\" so rescheduled for 5/21/24.    Recent vitals: Afebrile, -150/50-70s with HR 60-70s and weight stable.    She is dozing in her room this afternoon but wakes easily to voice and welcomes a visit.  Does recall that she was feeling somewhat unwell last week but she is not sure why.  Thankfully though, for some reason that she cannot put a finger on, she is feeling better this week.  No nausea.  Says her bowels are moving okay.  She is not that bothered by leg pain but she says some of the therapy exercises hurt her chronic back pain.  On review of the MAR, she is using the as needed methocarbamol about 1-2 times a day and using the newly increased oxycodone at 5 mg as needed about 1-2 times per day.  There is a nursing order to offer the medications in the morning prior to physical therapy.  She had canceled her [facility] appointment last week because she was feeling unwell but staff have it rescheduled for tomorrow with Dr. Evans.  She has transportation set up.  I remind her of this appointment and she is a little uneasy about going but ultimately with some encouragement is willing to go (likely her cognitive impairment plays into her uneasiness).  She feels her breathing is doing \"just fine\".  I confirmed with nursing staff that they are appropriately weaning oxygen.  She is currently on only 1 L/min nasal cannula oxygen.    CODE STATUS/ADVANCE DIRECTIVES DISCUSSION:   DNR / DNI  [facility]    ALLERGIES: Atorvastatin, Macrobid [nitrofurantoin anhydrous], Nitrofurantoin, Penicillins, Seasonal allergies, Sulfa antibiotics, and Sulfasalazine    Past Medical History:   Diagnosis Date     Anemia      Anxiety      Arthritis      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 (H28) 4/16/2014     Closed fracture of multiple pubic rami, left, " initial encounter (H) 10/6/2021     Closed fracture of proximal end of right tibia and fibula 8/28/2019     Depression      h/o Clostridium difficile colitis 06/2019     Hip fracture (H) 2/28/2011     History of blood transfusion      Hypertension      Kidney stone      Lumbar compression fracture (H)      Macular degeneration (senile) of retina, unspecified      Mild cognitive impairment      Mumps      NSTEMI (non-ST elevated myocardial infarction) (H) 5/28/2017     Other chronic pain     low back     Recurrent UTI      Sepsis secondary to UTI (H) 7/29/2019     Upper GI bleed 06/2019     Urinary tract infection due to extended-spectrum beta lactamase (ESBL)-producing Klebsiella 05/2019    resistant to Bactrim      Past Surgical History:   Procedure Laterality Date     Cataract removal NOS Bilateral      COMBINED CYSTOSCOPY, INSERT STENT URETER(S) Left 5/6/2020    Procedure: Cystoscopy with left ureteral stent insertion;  Surgeon: Gaurav Munson MD;  Location:  OR     CYSTOSCOPY N/A 8/1/2019    Procedure: Exam under anesthesia, video cystopanendoscopy;  Surgeon: Dennis Carlson MD;  Location:  OR     ESOPHAGOSCOPY, GASTROSCOPY, DUODENOSCOPY (EGD), COMBINED N/A 6/11/2019    Procedure: ESOPHAGOGASTRODUODENOSCOPY (EGD);  Surgeon: Gaurav Degroot MD;  Location:  GI     EXTRACORPOREAL SHOCK WAVE LITHOTRIPSY (ESWL) Left 5/28/2020    Procedure: LEFT EXTRACORPOREAL SHOCK WAVE LITHOTRIPSY;  Surgeon: Gaurav Munson MD;  Location:  OR     Hip Pinning procedure Left      Hip replacement NOS Right      LASER HOLMIUM LITHOTRIPSY URETER(S), INSERT STENT, COMBINED Left 10/30/2020    Procedure: Cystoscopy, left ureteral stent exchange, left retrograde pyelogram, interpretation of fluoroscopic images, left ureteroscopy with holmium lithotripsy and stone basketing;  Surgeon: Gaurav Munson MD;  Location:  OR     OPEN REDUCTION INTERNAL FIXATION ANKLE Left 5/3/2024    Procedure: Left Mey VELAZQUEZ  lateral malleolus fracture open reduction and internal fixation with retrograde fibular nail;  Surgeon: Colin Evans MD;  Location: [facility] OR     OPEN REDUCTION INTERNAL FIXATION RODDING INTRAMEDULLARY TIBIA Left 5/3/2024    Procedure: Left tibia shaft open reduction and internal fixation with intramedullary nail;  Surgeon: Colin Evans MD;  Location: [facility] OR     OPEN REDUCTION INTERNAL FIXATION TIBIAL PLATEAU Right 8/28/2019    Procedure: Open reduction internal fixation right proximal tibia fracture;  Surgeon: Molina Pardo MD;  Location: [facility] OR     TONSILLECTOMY, ADENOIDECTOMY, COMBINED           Post-discharge medication reconciliation status: Reviewed and updated in [facility] according to facility MAR    Current Outpatient Medications   Medication Sig Dispense Refill     acetaminophen (TYLENOL) 500 MG tablet Take 2 tablets (1,000 mg) by mouth 3 times daily       albuterol (PROVENTIL) (2.5 MG/3ML) 0.083% neb solution Take 1 vial (2.5 mg) by nebulization every 4 hours as needed for shortness of breath, wheezing or cough       amLODIPine (NORVASC) 10 MG tablet Take 10 mg by mouth daily       aspirin (ASA) 325 MG tablet Take 1 tablet (325 mg) by mouth daily 42 tablet 0     carvedilol (COREG) 12.5 MG tablet Take 12.5 mg by mouth 2 times daily (with meals)       Cholecalciferol (VITAMIN D3) 2000 UNITS CAPS Take 2,000 Units by mouth daily        cloNIDine (CATAPRES) 0.1 MG tablet Take 0.1 mg by mouth at bedtime       cyanocobalamin (VITAMIN B-12) 1000 MCG tablet Take 1,000 mcg by mouth daily       Ferrous Gluconate 324 (37.5 Fe) MG TABS Take 1 tablet by mouth daily       furosemide (LASIX) 20 MG tablet Take 1 tablet (20 mg) by mouth daily       guaiFENesin (MUCINEX) 600 MG 12 hr tablet Take 1 tablet (600 mg) by mouth 2 times daily       isosorbide dinitrate (ISORDIL) 10 MG tablet Take 1 tablet (10 mg) by mouth 3 times daily 270 tablet 3     lisinopril (ZESTRIL) 30 MG tablet Take 30 mg by mouth daily    "    methocarbamol (ROBAXIN) 500 MG tablet Take 1 tablet (500 mg) by mouth every 6 hours as needed for muscle spasms 30 tablet 0     Multiple Vitamins-Minerals (PRESERVISION AREDS) CAPS Take 1 capsule by mouth 2 times daily        nystatin (MYCOSTATIN) 344463 UNIT/GM external cream Apply topically 2 times daily as needed (rash)       omeprazole (PRILOSEC) 20 MG DR capsule Take 20 mg by mouth daily        ondansetron (ZOFRAN) 4 MG tablet Take 4 mg by mouth every 8 hours as needed for nausea       oxyCODONE (ROXICODONE) 5 MG tablet Take 1 tablet (5 mg) by mouth every 4 hours as needed for moderate pain 15 tablet 0     polyethylene glycol (MIRALAX/GLYCOLAX) packet Take 17 g by mouth daily as needed for constipation       potassium chloride ER (K-TAB/KLOR-CON) 10 MEQ CR tablet Take 10 mEq by mouth daily       senna-docusate (SENOKOT-S/PERICOLACE) 8.6-50 MG tablet Take 1 tablet by mouth 2 times daily as needed for constipation 30 tablet 0     sertraline (ZOLOFT) 100 MG tablet Take 100 mg by mouth daily         ROS:  Limited secondary to cognitive impairment but today pt reports as above in HPI    Exam:  BP (!) 142/69   Pulse 62   Temp 97.5  F (36.4  C)   Resp 18   Ht 1.626 m (5' 4\")   Wt 53.4 kg (117 lb 12.8 oz)   SpO2 98%   BMI 20.22 kg/m    Dozing in bed but awakes easily to voice  Casually dressed, frail, appears stated age  Moist oral mucosa  Heart tones regular without murmurs rubs or gallops  Breathing nonlabored on 1 L/min nasal cannula oxygen, no cough, clear left lung, right lung clear as well but slightly dim at the right base  Abdomen soft, nontender  Right lower extremity without edema  Left lower extremity in cast with toes visible and they are warm, normal color and she is able to wiggle them all independently  Slightly vague historian and benefits from reminders  Mood euthymic    Lab/Diagnostic data:  Most Recent 3 CBC's:  Recent Labs   Lab Test 05/13/24  0640 05/07/24  0617 05/06/24  0703 " 05/05/24  0727 05/03/24  0716 05/02/24  0717   WBC 5.2  --   --  12.7*  --  8.6   HGB 7.9* 7.8* 7.8* 9.1*   < > 9.1*   MCV 99  --   --  97  --  103*     --  145* 176  --  194    < > = values in this interval not displayed.     Most Recent 3 BMP's:  Recent Labs   Lab Test 05/17/24  0648 05/06/24  0703 05/05/24  0727 05/04/24  0756 05/03/24  1813 05/02/24  0717 05/01/24  2219   *  --   --   --   --  137 135   POTASSIUM 4.1  --   --   --   --  3.6 3.5   CHLORIDE 100  --   --   --   --  102 101   CO2 26  --   --   --   --  24 25   BUN 15.8  --   --   --   --  16.4 17.0   CR 0.48* 0.51  --   --  0.47* 0.48* 0.75   ANIONGAP 8  --   --   --   --  11 9   DAVE 9.1  --   --   --   --  9.1 9.3   GLC 92  --  102* 119*  --  97 156*     Most Recent 2 LFT's:  Recent Labs   Lab Test 01/24/23  0644 06/22/22  1000   AST 14 9   ALT 8* <9   ALKPHOS 77 57   BILITOTAL 1.1 0.9     Most Recent TSH and T4:  Recent Labs   Lab Test 12/10/19  0848   TSH 0.79     Most Recent Hemoglobin A1c:  Recent Labs   Lab Test 01/13/17  1020   A1C 5.3     Hospital imaging:  Xray L ankle:  IMPRESSION: Severely comminuted and moderately displaced fractures of the distal diaphyses of the tibia and fibula. Osteopenia. Underlying lucency about the fractures may be due to slightly asymmetric osteopenia, however pathologic fractures are not entirely excluded. Soft tissue swelling about the distal lower extremity and ankle. Small dorsal surface calcaneal spur.    CT Head/neck:  IMPRESSION:  HEAD CT:  1.  Question mild right periorbital soft tissue swelling. No acute intracranial hemorrhage or calvarial fracture.     2.  Age-related and chronic ischemic changes are stable.     CERVICAL SPINE CT:  1.  No acute cervical spine fracture.     2.  Mild chronic T2-T4 compressions are stable.      ASSESSMENT/PLAN:  Fall, subsequent encounter  Closed fracture of distal end of left fibula and tibia with routine healing, subsequent encounter  [facility] follow up  rescheduled with Dr. Evans for tomorrow  Pending xrays, could possibly come back in boot and advancement of weight bearing abilities which would then adjust her TCU trajectory  Pain control reasonable from ankle standpoint though pain more significant in back (see below)   mg daily for DVT prophy with appropriate stop date in place for 6/18; monitor Hgb as noted below  Physical therapy and occupational therapy     ABLA (acute blood loss anemia)  Anemia, unspecified type  Hgb recently stable in upper 7s but slightly down from her baseline; did receive blood transfusion in the hospital  Reasonable Hgb prior to discharge or in the coming weeks for trend as slowly hopefully will drift upwards  She is on iron and B12 as outpatient as well as PPI  Consider spacing out iron to EOD dosing for better absorption    Acute respiratory failure with hypoxia (H)  Pleural effusion on right  Hyponatremia  Benign essential hypertension  Afebrile, -150/50-70s with HR 60-70s and weight stable  Continues on several anti-hypertensives with reasonable BP for age/frailty  New Lasix/K since 5/13/24 for CXR noting small right pleural effusion and mild volume overload and this seems well tolerated and weaning O2 appropriately (currently 1 LPM); no respiratory symptoms now she can recall  Discussed with RN to continue weaning  Future follow up BMP reasonable prior to discharge on electrolytes with mild hyponatremia    Chronic low back pain without sciatica, unspecified back pain laterality  Physical deconditioning  Continue physical therapy and occupational therapy as able/tolerated  Wasn't on opiates prior to admission but agree with higher dose needed short term as therapy exacerbating symptoms of her chronic back pain  If back pain becomes more problematic consider imaging of the back as this was not done in the hospital and she is known to have osteoporotic compression fractures in the past and could have additional  fractures not previously noted  Is on Vit D supplement    Cognitive impairment  Generalized anxiety disorder  Occupational therapy eval and treat cognition to be sure safe discharge planning; previously resided in [facility]  No behaviors of concern but seems during my visit to benefit from reminders/prompts  Is on Sertraline for anxiety which will continue (does seem prone to anxiety re: outpatient [facility] appointment tomorrow)      Electronically signed by:  Gisela Meza DO      Sincerely,        Gisela Meza DO

## 2024-05-20 NOTE — PROGRESS NOTES
[place] [facility]  PHYSICIAN NOTE    PRIMARY CARE PROVIDER AND CLINIC:  [place] [place] [facility] [address] [address] / [place]*    Chief Complaint   Patient presents with    Hospital F/U     [place] Medical Record Number:  [id number]  Place of Service where encounter took place:  [facility] ([facility]) [[id number]]    Daya Ignacio is a 89 year old (10/11/1930), admitted to the above facility from  [facility]. Hospital stay 5/1/24 through 5/8/24. Admitted to this facility for  rehab, medical management, and nursing care.     HPI:    HPI information obtained from: facility chart records, facility staff, patient report, and [facility] chart review.     Brief summary of hospital course: Daya Ignacio is a 89yoF with h/o cognitive impairment residing at [facility], osteoporosis with prior compression fractures and chronic low back pain no longer on chronic opiates, HTN and anxiety who had an unwitnessed fall in her apartment. Details not entirely clear per notes how fall happened but not thought to be syncopal. As a result of the fall she unfortunately sustained left distal tibia/fibula fractures s/p ORIF with intramedullary nailing.  mg daily for DVT prophy and placed in short leg splint. To be NWB until ortho follow up and then consider transitioning to a boot pending xrays. Postop did need blood transfusion for acute on chronic anemia. Also was unable to wean off of O2 postop thought to have component of atelectasis. Discharged to TCU for rehab/cares remaining on O2 therapy.    Updates on status since skilled nursing admission: Since arrival to TCU, still has required O2 therapy. Found to have pleural effusion and Lasik/K started and staff continue to work on weaning down on O2. Also due to poor tolerance to therapy with pain, last week the PRN Oxycodone dose was increased from 2.5 mg to 5 mg dosing. Last week missed ortho  "appointment as just felt \"unwell\" so rescheduled for 5/21/24.    Recent vitals: Afebrile, -150/50-70s with HR 60-70s and weight stable.    She is dozing in her room this afternoon but wakes easily to voice and welcomes a visit.  Does recall that she was feeling somewhat unwell last week but she is not sure why.  Thankfully though, for some reason that she cannot put a finger on, she is feeling better this week.  No nausea.  Says her bowels are moving okay.  She is not that bothered by leg pain but she says some of the therapy exercises hurt her chronic back pain.  On review of the MAR, she is using the as needed methocarbamol about 1-2 times a day and using the newly increased oxycodone at 5 mg as needed about 1-2 times per day.  There is a nursing order to offer the medications in the morning prior to physical therapy.  She had canceled her [facility] appointment last week because she was feeling unwell but staff have it rescheduled for tomorrow with Dr. Evans.  She has transportation set up.  I remind her of this appointment and she is a little uneasy about going but ultimately with some encouragement is willing to go (likely her cognitive impairment plays into her uneasiness).  She feels her breathing is doing \"just fine\".  I confirmed with nursing staff that they are appropriately weaning oxygen.  She is currently on only 1 L/min nasal cannula oxygen.    CODE STATUS/ADVANCE DIRECTIVES DISCUSSION:   DNR / DNI  [facility]    ALLERGIES: Atorvastatin, Macrobid [nitrofurantoin anhydrous], Nitrofurantoin, Penicillins, Seasonal allergies, Sulfa antibiotics, and Sulfasalazine    Past Medical History:   Diagnosis Date    Anemia     Anxiety     Arthritis     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 (H28) 4/16/2014    Closed fracture of multiple pubic rami, left, initial encounter (H) 10/6/2021    Closed fracture of proximal end of right tibia and fibula " 8/28/2019    Depression     h/o Clostridium difficile colitis 06/2019    Hip fracture (H) 2/28/2011    History of blood transfusion     Hypertension     Kidney stone     Lumbar compression fracture (H)     Macular degeneration (senile) of retina, unspecified     Mild cognitive impairment     Mumps     NSTEMI (non-ST elevated myocardial infarction) (H) 5/28/2017    Other chronic pain     low back    Recurrent UTI     Sepsis secondary to UTI (H) 7/29/2019    Upper GI bleed 06/2019    Urinary tract infection due to extended-spectrum beta lactamase (ESBL)-producing Klebsiella 05/2019    resistant to Bactrim      Past Surgical History:   Procedure Laterality Date    Cataract removal NOS Bilateral     COMBINED CYSTOSCOPY, INSERT STENT URETER(S) Left 5/6/2020    Procedure: Cystoscopy with left ureteral stent insertion;  Surgeon: Gaurav Munson MD;  Location:  OR    CYSTOSCOPY N/A 8/1/2019    Procedure: Exam under anesthesia, video cystopanendoscopy;  Surgeon: Dennis Carlson MD;  Location:  OR    ESOPHAGOSCOPY, GASTROSCOPY, DUODENOSCOPY (EGD), COMBINED N/A 6/11/2019    Procedure: ESOPHAGOGASTRODUODENOSCOPY (EGD);  Surgeon: Gaurav Degroot MD;  Location:  GI    EXTRACORPOREAL SHOCK WAVE LITHOTRIPSY (ESWL) Left 5/28/2020    Procedure: LEFT EXTRACORPOREAL SHOCK WAVE LITHOTRIPSY;  Surgeon: Gaurav Munson MD;  Location:  OR    Hip Pinning procedure Left     Hip replacement NOS Right     LASER HOLMIUM LITHOTRIPSY URETER(S), INSERT STENT, COMBINED Left 10/30/2020    Procedure: Cystoscopy, left ureteral stent exchange, left retrograde pyelogram, interpretation of fluoroscopic images, left ureteroscopy with holmium lithotripsy and stone basketing;  Surgeon: Gaurav Munson MD;  Location:  OR    OPEN REDUCTION INTERNAL FIXATION ANKLE Left 5/3/2024    Procedure: Left Mathias C lateral malleolus fracture open reduction and internal fixation with retrograde fibular nail;  Surgeon: Nathan  Colin CARLIN MD;  Location: [facility] OR    OPEN REDUCTION INTERNAL FIXATION RODDING INTRAMEDULLARY TIBIA Left 5/3/2024    Procedure: Left tibia shaft open reduction and internal fixation with intramedullary nail;  Surgeon: Colin Evans MD;  Location: [facility] OR    OPEN REDUCTION INTERNAL FIXATION TIBIAL PLATEAU Right 8/28/2019    Procedure: Open reduction internal fixation right proximal tibia fracture;  Surgeon: Molina Pardo MD;  Location: [facility] OR    TONSILLECTOMY, ADENOIDECTOMY, COMBINED           Post-discharge medication reconciliation status: Reviewed and updated in [facility] according to facility MAR    Current Outpatient Medications   Medication Sig Dispense Refill    acetaminophen (TYLENOL) 500 MG tablet Take 2 tablets (1,000 mg) by mouth 3 times daily      albuterol (PROVENTIL) (2.5 MG/3ML) 0.083% neb solution Take 1 vial (2.5 mg) by nebulization every 4 hours as needed for shortness of breath, wheezing or cough      amLODIPine (NORVASC) 10 MG tablet Take 10 mg by mouth daily      aspirin (ASA) 325 MG tablet Take 1 tablet (325 mg) by mouth daily 42 tablet 0    carvedilol (COREG) 12.5 MG tablet Take 12.5 mg by mouth 2 times daily (with meals)      Cholecalciferol (VITAMIN D3) 2000 UNITS CAPS Take 2,000 Units by mouth daily       cloNIDine (CATAPRES) 0.1 MG tablet Take 0.1 mg by mouth at bedtime      cyanocobalamin (VITAMIN B-12) 1000 MCG tablet Take 1,000 mcg by mouth daily      Ferrous Gluconate 324 (37.5 Fe) MG TABS Take 1 tablet by mouth daily      furosemide (LASIX) 20 MG tablet Take 1 tablet (20 mg) by mouth daily      guaiFENesin (MUCINEX) 600 MG 12 hr tablet Take 1 tablet (600 mg) by mouth 2 times daily      isosorbide dinitrate (ISORDIL) 10 MG tablet Take 1 tablet (10 mg) by mouth 3 times daily 270 tablet 3    lisinopril (ZESTRIL) 30 MG tablet Take 30 mg by mouth daily      methocarbamol (ROBAXIN) 500 MG tablet Take 1 tablet (500 mg) by mouth every 6 hours as needed for muscle spasms 30 tablet 0     "Multiple Vitamins-Minerals (PRESERVISION AREDS) CAPS Take 1 capsule by mouth 2 times daily       nystatin (MYCOSTATIN) 993344 UNIT/GM external cream Apply topically 2 times daily as needed (rash)      omeprazole (PRILOSEC) 20 MG DR capsule Take 20 mg by mouth daily       ondansetron (ZOFRAN) 4 MG tablet Take 4 mg by mouth every 8 hours as needed for nausea      oxyCODONE (ROXICODONE) 5 MG tablet Take 1 tablet (5 mg) by mouth every 4 hours as needed for moderate pain 15 tablet 0    polyethylene glycol (MIRALAX/GLYCOLAX) packet Take 17 g by mouth daily as needed for constipation      potassium chloride ER (K-TAB/KLOR-CON) 10 MEQ CR tablet Take 10 mEq by mouth daily      senna-docusate (SENOKOT-S/PERICOLACE) 8.6-50 MG tablet Take 1 tablet by mouth 2 times daily as needed for constipation 30 tablet 0    sertraline (ZOLOFT) 100 MG tablet Take 100 mg by mouth daily         ROS:  Limited secondary to cognitive impairment but today pt reports as above in HPI    Exam:  BP (!) 142/69   Pulse 62   Temp 97.5  F (36.4  C)   Resp 18   Ht 1.626 m (5' 4\")   Wt 53.4 kg (117 lb 12.8 oz)   SpO2 98%   BMI 20.22 kg/m    Dozing in bed but awakes easily to voice  Casually dressed, frail, appears stated age  Moist oral mucosa  Heart tones regular without murmurs rubs or gallops  Breathing nonlabored on 1 L/min nasal cannula oxygen, no cough, clear left lung, right lung clear as well but slightly dim at the right base  Abdomen soft, nontender  Right lower extremity without edema  Left lower extremity in cast with toes visible and they are warm, normal color and she is able to wiggle them all independently  Slightly vague historian and benefits from reminders  Mood euthymic    Lab/Diagnostic data:  Most Recent 3 CBC's:  Recent Labs   Lab Test 05/13/24  0640 05/07/24  0617 05/06/24  0703 05/05/24  0727 05/03/24  0716 05/02/24  0717   WBC 5.2  --   --  12.7*  --  8.6   HGB 7.9* 7.8* 7.8* 9.1*   < > 9.1*   MCV 99  --   --  97  --  103* "     --  145* 176  --  194    < > = values in this interval not displayed.     Most Recent 3 BMP's:  Recent Labs   Lab Test 05/17/24  0648 05/06/24  0703 05/05/24  0727 05/04/24  0756 05/03/24  1813 05/02/24  0717 05/01/24  2219   *  --   --   --   --  137 135   POTASSIUM 4.1  --   --   --   --  3.6 3.5   CHLORIDE 100  --   --   --   --  102 101   CO2 26  --   --   --   --  24 25   BUN 15.8  --   --   --   --  16.4 17.0   CR 0.48* 0.51  --   --  0.47* 0.48* 0.75   ANIONGAP 8  --   --   --   --  11 9   DAVE 9.1  --   --   --   --  9.1 9.3   GLC 92  --  102* 119*  --  97 156*     Most Recent 2 LFT's:  Recent Labs   Lab Test 01/24/23  0644 06/22/22  1000   AST 14 9   ALT 8* <9   ALKPHOS 77 57   BILITOTAL 1.1 0.9     Most Recent TSH and T4:  Recent Labs   Lab Test 12/10/19  0848   TSH 0.79     Most Recent Hemoglobin A1c:  Recent Labs   Lab Test 01/13/17  1020   A1C 5.3     Hospital imaging:  Xray L ankle:  IMPRESSION: Severely comminuted and moderately displaced fractures of the distal diaphyses of the tibia and fibula. Osteopenia. Underlying lucency about the fractures may be due to slightly asymmetric osteopenia, however pathologic fractures are not entirely excluded. Soft tissue swelling about the distal lower extremity and ankle. Small dorsal surface calcaneal spur.    CT Head/neck:  IMPRESSION:  HEAD CT:  1.  Question mild right periorbital soft tissue swelling. No acute intracranial hemorrhage or calvarial fracture.     2.  Age-related and chronic ischemic changes are stable.     CERVICAL SPINE CT:  1.  No acute cervical spine fracture.     2.  Mild chronic T2-T4 compressions are stable.      ASSESSMENT/PLAN:  Fall, subsequent encounter  Closed fracture of distal end of left fibula and tibia with routine healing, subsequent encounter  TCO follow up rescheduled with Dr. Evans for tomorrow  Pending xrays, could possibly come back in boot and advancement of weight bearing abilities which would then  adjust her TCU trajectory  Pain control reasonable from ankle standpoint though pain more significant in back (see below)   mg daily for DVT prophy with appropriate stop date in place for 6/18; monitor Hgb as noted below  Physical therapy and occupational therapy     ABLA (acute blood loss anemia)  Anemia, unspecified type  Hgb recently stable in upper 7s but slightly down from her baseline; did receive blood transfusion in the hospital  Reasonable Hgb prior to discharge or in the coming weeks for trend as slowly hopefully will drift upwards  She is on iron and B12 as outpatient as well as PPI  Consider spacing out iron to EOD dosing for better absorption    Acute respiratory failure with hypoxia (H)  Pleural effusion on right  Hyponatremia  Benign essential hypertension  Afebrile, -150/50-70s with HR 60-70s and weight stable  Continues on several anti-hypertensives with reasonable BP for age/frailty  New Lasix/K since 5/13/24 for CXR noting small right pleural effusion and mild volume overload and this seems well tolerated and weaning O2 appropriately (currently 1 LPM); no respiratory symptoms now she can recall  Discussed with RN to continue weaning  Future follow up BMP reasonable prior to discharge on electrolytes with mild hyponatremia    Chronic low back pain without sciatica, unspecified back pain laterality  Physical deconditioning  Continue physical therapy and occupational therapy as able/tolerated  Wasn't on opiates prior to admission but agree with higher dose needed short term as therapy exacerbating symptoms of her chronic back pain  If back pain becomes more problematic consider imaging of the back as this was not done in the hospital and she is known to have osteoporotic compression fractures in the past and could have additional fractures not previously noted  Is on Vit D supplement    Cognitive impairment  Generalized anxiety disorder  Occupational therapy eval and treat cognition to  be sure safe discharge planning; previously resided in [place]  No behaviors of concern but seems during my visit to benefit from reminders/prompts  Is on Sertraline for anxiety which will continue (does seem prone to anxiety re: outpatient TCO appointment tomorrow)      Electronically signed by:  Gisela Meza DO

## 2024-05-21 ENCOUNTER — TRANSFERRED RECORDS (OUTPATIENT)
Dept: HEALTH INFORMATION MANAGEMENT | Facility: CLINIC | Age: 89
End: 2024-05-21
Payer: COMMERCIAL

## 2024-05-23 ENCOUNTER — LAB REQUISITION (OUTPATIENT)
Dept: LAB | Facility: CLINIC | Age: 89
End: 2024-05-23
Payer: COMMERCIAL

## 2024-05-23 ENCOUNTER — TRANSITIONAL CARE UNIT VISIT (OUTPATIENT)
Dept: GERIATRICS | Facility: CLINIC | Age: 89
End: 2024-05-23
Payer: COMMERCIAL

## 2024-05-23 VITALS
DIASTOLIC BLOOD PRESSURE: 68 MMHG | HEIGHT: 64 IN | BODY MASS INDEX: 20.11 KG/M2 | SYSTOLIC BLOOD PRESSURE: 131 MMHG | OXYGEN SATURATION: 98 % | TEMPERATURE: 97.8 F | RESPIRATION RATE: 18 BRPM | HEART RATE: 64 BPM | WEIGHT: 117.8 LBS

## 2024-05-23 DIAGNOSIS — S82.832D CLOSED FRACTURE OF DISTAL END OF LEFT FIBULA AND TIBIA WITH ROUTINE HEALING, SUBSEQUENT ENCOUNTER: Primary | ICD-10-CM

## 2024-05-23 DIAGNOSIS — Z87.81 H/O COMPRESSION FRACTURE OF SPINE: ICD-10-CM

## 2024-05-23 DIAGNOSIS — R10.31 RIGHT LOWER QUADRANT PAIN: ICD-10-CM

## 2024-05-23 DIAGNOSIS — D62 ABLA (ACUTE BLOOD LOSS ANEMIA): ICD-10-CM

## 2024-05-23 DIAGNOSIS — J90 PLEURAL EFFUSION ON RIGHT: ICD-10-CM

## 2024-05-23 DIAGNOSIS — G89.29 CHRONIC LOW BACK PAIN WITHOUT SCIATICA, UNSPECIFIED BACK PAIN LATERALITY: ICD-10-CM

## 2024-05-23 DIAGNOSIS — M54.50 CHRONIC LOW BACK PAIN WITHOUT SCIATICA, UNSPECIFIED BACK PAIN LATERALITY: ICD-10-CM

## 2024-05-23 DIAGNOSIS — R41.89 COGNITIVE IMPAIRMENT: ICD-10-CM

## 2024-05-23 DIAGNOSIS — N18.31 STAGE 3A CHRONIC KIDNEY DISEASE (H): ICD-10-CM

## 2024-05-23 DIAGNOSIS — I10 BENIGN ESSENTIAL HYPERTENSION: ICD-10-CM

## 2024-05-23 DIAGNOSIS — S82.302D CLOSED FRACTURE OF DISTAL END OF LEFT FIBULA AND TIBIA WITH ROUTINE HEALING, SUBSEQUENT ENCOUNTER: Primary | ICD-10-CM

## 2024-05-23 DIAGNOSIS — J96.01 ACUTE RESPIRATORY FAILURE WITH HYPOXIA (H): ICD-10-CM

## 2024-05-23 PROCEDURE — 99309 SBSQ NF CARE MODERATE MDM 30: CPT | Performed by: PHYSICIAN ASSISTANT

## 2024-05-23 RX ORDER — HYDROMORPHONE HYDROCHLORIDE 2 MG/1
TABLET ORAL
Qty: 20 TABLET | Refills: 0 | Status: SHIPPED | OUTPATIENT
Start: 2024-05-23 | End: 2024-05-25

## 2024-05-23 RX ORDER — METHOCARBAMOL 500 MG/1
500 TABLET, FILM COATED ORAL 3 TIMES DAILY
Status: ON HOLD | DISCHARGE
Start: 2024-05-23 | End: 2024-05-26

## 2024-05-23 NOTE — PROGRESS NOTES
"  Chief Complaint   Patient presents with    Nursing Home Acute       HPI:  Daya Ignacio is a 89 year old  (10/11/1930), who is being seen today for an episodic care visit at: [facility] ([facility]) [[id number]].     Brief summary:  Daya Ignacio is a 89-year-old female with a past medical history of hypertension, chronic back pain, mild cognitive impairment, GERD, previous C. difficile, history of right tib-fib fracture who was recently hospitalized at [facility].  Presented from her facility with a fall.  Found to have a distal tib-fib fracture.  Orthopedics consulted underwent surgical ORIF.  Postoperative course complicated by hypoxia with cough.  Chest x-ray negative.  Treated supportively.  Assessed by therapies and discharged to [facility]    Today's concern is: Went to Ortho appointment this week- orders/note reviewed.  Still nonweightbearing for an additional 4 weeks.  Ortho notes indicate consider transitioning from oxycodone to Dilaudid for improved pain control.  Spoke with PT.  Participation therapy has been limited.    Daya is evaluated resting in bed.  Still on oxygen.  2 L.  Was down to 1 L earlier this week.  Denies shortness of breath.  Asks if I am going to make her get up which I indicated I am only here to visit.  Complains of pain in her back and in her side.  Not so much in her lower extremity.  Denies constipation.  No nausea.  Feels very fatigued.  CBC /Hgb/xray back    Spoke with RN.  Encouraged ongoing weaning of oxygen    Review of nursing home EMR:-143,       Allergies, and PMH/PSH reviewed in EPIC today.    REVIEW OF SYSTEMS:  4 point ROS including Respiratory, CV, GI and , other than that noted in the HPI,  is negative    Objective:   /68   Pulse 64   Temp 97.8  F (36.6  C)   Resp 18   Ht 1.626 m (5' 4\")   Wt 53.4 kg (117 lb 12.8 oz)   SpO2 98%   BMI 20.22 kg/m      Physical Exam  Vitals (Facility EMR) reviewed.   Constitutional:       General: She is not in " acute distress.  HENT:      Head: Normocephalic and atraumatic.   Eyes:      General: No scleral icterus.  Cardiovascular:      Rate and Rhythm: Normal rate and regular rhythm.      Heart sounds: No murmur heard.  Pulmonary:      Effort: Pulmonary effort is normal.   Abdominal:      General: Bowel sounds are normal. There is no distension.   Musculoskeletal:      Right lower leg: No edema.      Left lower leg: No edema.      Comments: Left lower extremity without cam boot.  No significant edema or bruising.  Mild tenderness to palpation of the right chest wall.   Skin:     General: Skin is warm and dry.      Findings: No rash.   Neurological:      Mental Status: She is alert. Mental status is at baseline.   Psychiatric:         Behavior: Behavior normal.          MED REC REQUIRED  Post Medication Reconciliation Status: discharge medications reconciled and changed, per note/orders      Recent labs in [facility] reviewed by me today.  and Most Recent 3 CBC's:  Recent Labs   Lab Test 05/13/24  0640 05/07/24  0617 05/06/24  0703 05/05/24  0727 05/03/24  0716 05/02/24  0717   WBC 5.2  --   --  12.7*  --  8.6   HGB 7.9* 7.8* 7.8* 9.1*   < > 9.1*   MCV 99  --   --  97  --  103*     --  145* 176  --  194    < > = values in this interval not displayed.     Most Recent 3 BMP's:  Recent Labs   Lab Test 05/17/24  0648 05/06/24  0703 05/05/24  0727 05/04/24  0756 05/03/24  1813 05/02/24  0717 05/01/24  2219   *  --   --   --   --  137 135   POTASSIUM 4.1  --   --   --   --  3.6 3.5   CHLORIDE 100  --   --   --   --  102 101   CO2 26  --   --   --   --  24 25   BUN 15.8  --   --   --   --  16.4 17.0   CR 0.48* 0.51  --   --  0.47* 0.48* 0.75   ANIONGAP 8  --   --   --   --  11 9   DAVE 9.1  --   --   --   --  9.1 9.3   GLC 92  --  102* 119*  --  97 156*     Most Recent 2 LFT's:  Recent Labs   Lab Test 01/24/23  0644 06/22/22  1000   AST 14 9   ALT 8* <9   ALKPHOS 77 57   BILITOTAL 1.1 0.9       Assessment/Plan:  Fall  Acute  comminuted distal left tibia/fibular fracture status post ORIF 5/3/2024:   - Continues NWB at least until next Ortho Follow up 6/25  -Pain control as discussed below  - Aspirin for DVT prophylaxis  - PT/OT/social work.  - Continue vitamin D supplementation:    Acute on Chronic pain Back Pain  H/o Vertebral compression Fx T11-L3: Spoke with son Gael 5/23 x 14 min.  Patient has longstanding history of chronic back pain related to known compression fractures.  Back pain likely limiting therapy participation more than acute tib-fib fracture.  Likely exacerbated in the setting of diminished mobility Gael's impression is that oxycodone has historically not worked particularly well for the patient which prompted the consideration to change to Dilaudid.  Additionally he questioned whether or not scheduling medication would be more appropriate given her her baseline dementia and lack of insight to know to ask.  Discussed balance of treating pain versus oversedation.  Additionally, Gael would like to see if it is possible for him to be present during therapy sessions and attempts to improve participation  - Continue scheduled Tylenol  - Adjust methocarbamol to 500 mg 3 times daily  - Discontinue oxycodone.  Start Dilaudid 2 mg scheduled in the morning and every 6 hours as needed  - Repeat spinal x-rays given no repeat during hospital stay and recent fall.  Acknowledge patient has known baseline compression fractures T11-L3      Hypoxia  Right Pleural Effusion  Cough: Present since hospital admission. CXR during hospitalization negative. Repeat CXR 5/13 at TCU with pleural effusion and concern for volume overload and started on low dose Lasix   - Notes cough improved but still on O2. Continue lasix for now.  Suspect ongoing atelectasis contributing to hypoxia.  Discussed with nursing to wean.  Encourage out of bed activity  - Repeat BMP tomorrow with continuation of Lasix      Acute on chronic anemia: Baseline hemoglobin 9.   Jordi 6.4 and received 1 unit PRBCs.  Discharge hemoglobin 7.8  - CBC stable at TCU  - Repeat CBC 5/24      Hypertension:  - Continue Coreg, lisinopril, Isordil, amlodipine and clonidine  - Continue Lasix    CKD stage IIIa: Estimated Creatinine Clearance: 79.1 mL/min (A) (based on SCr of 0.48 mg/dL (L)).  -Cr stable with Lasix  - Repat BMP as above    GERD:  - Continue PPI    Cognitive Impairment  MDD  Anxiety  -Continue Zoloft  -Cognition scores at TCU with slums 8/30.  - OT and social work following.  Lives in [place]      Orders:  As above      This note was completed in part using Dragon voice recognition software. Although reviewed after completion, some word and grammatical errors may occur.      Electronically signed by: Olivia Melara PA-C

## 2024-05-23 NOTE — LETTER
"    5/23/2024        RE: Daya Ignacio  [address] [address]  [facility] [id number]          Chief Complaint   Patient presents with     Nursing Home Acute       HPI:  Daya Ignacio is a 89 year old  (10/11/1930), who is being seen today for an episodic care visit at: [facility] ([facility]) [[id number]].     Brief summary:  Daay Ignacio is a 89-year-old female with a past medical history of hypertension, chronic back pain, mild cognitive impairment, GERD, previous C. difficile, history of right tib-fib fracture who was recently hospitalized at [facility].  Presented from her facility with a fall.  Found to have a distal tib-fib fracture.  Orthopedics consulted underwent surgical ORIF.  Postoperative course complicated by hypoxia with cough.  Chest x-ray negative.  Treated supportively.  Assessed by therapies and discharged to [facility]    Today's concern is: Went to Ortho appointment this week- orders/note reviewed.  Still nonweightbearing for an additional 4 weeks.  Ortho notes indicate consider transitioning from oxycodone to Dilaudid for improved pain control.  Spoke with PT.  Participation therapy has been limited.    Daya is evaluated resting in bed.  Still on oxygen.  2 L.  Was down to 1 L earlier this week.  Denies shortness of breath.  Asks if I am going to make her get up which I indicated I am only here to visit.  Complains of pain in her back and in her side.  Not so much in her lower extremity.  Denies constipation.  No nausea.  Feels very fatigued.  CBC /Hgb/xray back    Spoke with RN.  Encouraged ongoing weaning of oxygen    Review of nursing home EMR:-143,       Allergies, and PMH/PSH reviewed in [employer] today.    REVIEW OF SYSTEMS:  4 point ROS including Respiratory, CV, GI and , other than that noted in the HPI,  is negative    Objective:   /68   Pulse 64   Temp 97.8  F (36.6  C)   Resp 18   Ht 1.626 m (5' 4\")   Wt 53.4 kg (117 lb 12.8 oz)   SpO2 98%   BMI 20.22 kg/m  "     Physical Exam  Vitals (Facility EMR) reviewed.   Constitutional:       General: She is not in acute distress.  HENT:      Head: Normocephalic and atraumatic.   Eyes:      General: No scleral icterus.  Cardiovascular:      Rate and Rhythm: Normal rate and regular rhythm.      Heart sounds: No murmur heard.  Pulmonary:      Effort: Pulmonary effort is normal.   Abdominal:      General: Bowel sounds are normal. There is no distension.   Musculoskeletal:      Right lower leg: No edema.      Left lower leg: No edema.      Comments: Left lower extremity without cam boot.  No significant edema or bruising.  Mild tenderness to palpation of the right chest wall.   Skin:     General: Skin is warm and dry.      Findings: No rash.   Neurological:      Mental Status: She is alert. Mental status is at baseline.   Psychiatric:         Behavior: Behavior normal.          MED REC REQUIRED  Post Medication Reconciliation Status: discharge medications reconciled and changed, per note/orders      Recent labs in [facility] reviewed by me today.  and Most Recent 3 CBC's:  Recent Labs   Lab Test 05/13/24  0640 05/07/24  0617 05/06/24  0703 05/05/24  0727 05/03/24  0716 05/02/24  0717   WBC 5.2  --   --  12.7*  --  8.6   HGB 7.9* 7.8* 7.8* 9.1*   < > 9.1*   MCV 99  --   --  97  --  103*     --  145* 176  --  194    < > = values in this interval not displayed.     Most Recent 3 BMP's:  Recent Labs   Lab Test 05/17/24  0648 05/06/24  0703 05/05/24  0727 05/04/24  0756 05/03/24  1813 05/02/24  0717 05/01/24  2219   *  --   --   --   --  137 135   POTASSIUM 4.1  --   --   --   --  3.6 3.5   CHLORIDE 100  --   --   --   --  102 101   CO2 26  --   --   --   --  24 25   BUN 15.8  --   --   --   --  16.4 17.0   CR 0.48* 0.51  --   --  0.47* 0.48* 0.75   ANIONGAP 8  --   --   --   --  11 9   DAVE 9.1  --   --   --   --  9.1 9.3   GLC 92  --  102* 119*  --  97 156*     Most Recent 2 LFT's:  Recent Labs   Lab Test 01/24/23  0644  06/22/22  1000   AST 14 9   ALT 8* <9   ALKPHOS 77 57   BILITOTAL 1.1 0.9       Assessment/Plan:  Fall  Acute comminuted distal left tibia/fibular fracture status post ORIF 5/3/2024:   - Continues NWB at least until next Ortho Follow up 6/25  -Pain control as discussed below  - Aspirin for DVT prophylaxis  - PT/OT/social work.  - Continue vitamin D supplementation:    Acute on Chronic pain Back Pain  H/o Vertebral compression Fx T11-L3: Spoke with son Gael 5/23 x 14 min.  Patient has longstanding history of chronic back pain related to known compression fractures.  Back pain likely limiting therapy participation more than acute tib-fib fracture.  Likely exacerbated in the setting of diminished mobility Gael's impression is that oxycodone has historically not worked particularly well for the patient which prompted the consideration to change to Dilaudid.  Additionally he questioned whether or not scheduling medication would be more appropriate given her her baseline dementia and lack of insight to know to ask.  Discussed balance of treating pain versus oversedation.  Additionally, Gael would like to see if it is possible for him to be present during therapy sessions and attempts to improve participation  - Continue scheduled Tylenol  - Adjust methocarbamol to 500 mg 3 times daily  - Discontinue oxycodone.  Start Dilaudid 2 mg scheduled in the morning and every 6 hours as needed  - Repeat spinal x-rays given no repeat during hospital stay and recent fall.  Acknowledge patient has known baseline compression fractures T11-L3      Hypoxia  Right Pleural Effusion  Cough: Present since hospital admission. CXR during hospitalization negative. Repeat CXR 5/13 at TCU with pleural effusion and concern for volume overload and started on low dose Lasix   - Notes cough improved but still on O2. Continue lasix for now.  Suspect ongoing atelectasis contributing to hypoxia.  Discussed with nursing to wean.  Encourage out of bed  activity  - Repeat BMP tomorrow with continuation of Lasix      Acute on chronic anemia: Baseline hemoglobin 9.  Jordi 6.4 and received 1 unit PRBCs.  Discharge hemoglobin 7.8  - CBC stable at TCU  - Repeat CBC 5/24      Hypertension:  - Continue Coreg, lisinopril, Isordil, amlodipine and clonidine  - Continue Lasix    CKD stage IIIa: Estimated Creatinine Clearance: 79.1 mL/min (A) (based on SCr of 0.48 mg/dL (L)).  -Cr stable with Lasix  - Repat BMP as above    GERD:  - Continue PPI    Cognitive Impairment  MDD  Anxiety  -Continue Zoloft  -Cognition scores at TCU with slums 8/30.  - OT and social work following.  Lives in [place]      Orders:  As above      This note was completed in part using Dragon voice recognition software. Although reviewed after completion, some word and grammatical errors may occur.      Electronically signed by: Olivia Melara PA-C       Sincerely,        Olivia Melara PA-C

## 2024-05-24 ENCOUNTER — LAB REQUISITION (OUTPATIENT)
Dept: LAB | Facility: CLINIC | Age: 89
End: 2024-05-24
Payer: COMMERCIAL

## 2024-05-24 DIAGNOSIS — D64.9 ANEMIA, UNSPECIFIED: ICD-10-CM

## 2024-05-24 DIAGNOSIS — N18.9 CHRONIC KIDNEY DISEASE, UNSPECIFIED: ICD-10-CM

## 2024-05-24 LAB
ERYTHROCYTE [DISTWIDTH] IN BLOOD BY AUTOMATED COUNT: 18.2 % (ref 10–15)
HCT VFR BLD AUTO: 26.1 % (ref 35–47)
HGB BLD-MCNC: 8.5 G/DL (ref 11.7–15.7)
MCH RBC QN AUTO: 34.1 PG (ref 26.5–33)
MCHC RBC AUTO-ENTMCNC: 32.6 G/DL (ref 31.5–36.5)
MCV RBC AUTO: 105 FL (ref 78–100)
PLATELET # BLD AUTO: 269 10E3/UL (ref 150–450)
RBC # BLD AUTO: 2.49 10E6/UL (ref 3.8–5.2)
WBC # BLD AUTO: 6 10E3/UL (ref 4–11)

## 2024-05-24 PROCEDURE — 80048 BASIC METABOLIC PNL TOTAL CA: CPT | Mod: ORL | Performed by: PHYSICIAN ASSISTANT

## 2024-05-24 PROCEDURE — 85027 COMPLETE CBC AUTOMATED: CPT | Mod: ORL | Performed by: PHYSICIAN ASSISTANT

## 2024-05-24 PROCEDURE — P9604 ONE-WAY ALLOW PRORATED TRIP: HCPCS | Mod: ORL | Performed by: PHYSICIAN ASSISTANT

## 2024-05-24 PROCEDURE — 36415 COLL VENOUS BLD VENIPUNCTURE: CPT | Mod: ORL | Performed by: PHYSICIAN ASSISTANT

## 2024-05-25 ENCOUNTER — HOSPITAL ENCOUNTER (INPATIENT)
Facility: CLINIC | Age: 89
LOS: 5 days | Discharge: NURSING, REHAB OR LONG-TERM CARE | DRG: 372 | End: 2024-05-31
Attending: EMERGENCY MEDICINE | Admitting: INTERNAL MEDICINE
Payer: COMMERCIAL

## 2024-05-25 ENCOUNTER — MEDICAL CORRESPONDENCE (OUTPATIENT)
Dept: HEALTH INFORMATION MANAGEMENT | Facility: CLINIC | Age: 89
End: 2024-05-25
Payer: COMMERCIAL

## 2024-05-25 DIAGNOSIS — R11.10 VOMITING AND DIARRHEA: ICD-10-CM

## 2024-05-25 DIAGNOSIS — D64.9 CHRONIC ANEMIA: ICD-10-CM

## 2024-05-25 DIAGNOSIS — A04.72 CLOSTRIDIUM DIFFICILE COLITIS: ICD-10-CM

## 2024-05-25 DIAGNOSIS — J96.01 ACUTE RESPIRATORY FAILURE WITH HYPOXIA (H): ICD-10-CM

## 2024-05-25 DIAGNOSIS — K52.9 COLITIS: ICD-10-CM

## 2024-05-25 DIAGNOSIS — R19.7 VOMITING AND DIARRHEA: ICD-10-CM

## 2024-05-25 DIAGNOSIS — R53.1 GENERALIZED WEAKNESS: ICD-10-CM

## 2024-05-25 DIAGNOSIS — K92.2 UPPER GI BLEED: Primary | ICD-10-CM

## 2024-05-25 DIAGNOSIS — D72.829 LEUKOCYTOSIS, UNSPECIFIED TYPE: ICD-10-CM

## 2024-05-25 DIAGNOSIS — E86.0 DEHYDRATION: ICD-10-CM

## 2024-05-25 LAB
ANION GAP SERPL CALCULATED.3IONS-SCNC: 9 MMOL/L (ref 7–15)
BUN SERPL-MCNC: 36.8 MG/DL (ref 8–23)
CALCIUM SERPL-MCNC: 9.5 MG/DL (ref 8.2–9.6)
CHLORIDE SERPL-SCNC: 101 MMOL/L (ref 98–107)
CREAT SERPL-MCNC: 0.76 MG/DL (ref 0.51–0.95)
DEPRECATED HCO3 PLAS-SCNC: 24 MMOL/L (ref 22–29)
EGFRCR SERPLBLD CKD-EPI 2021: 73 ML/MIN/1.73M2
GLUCOSE SERPL-MCNC: 151 MG/DL (ref 70–99)
POTASSIUM SERPL-SCNC: 4.7 MMOL/L (ref 3.4–5.3)
SODIUM SERPL-SCNC: 134 MMOL/L (ref 135–145)

## 2024-05-25 PROCEDURE — 99291 CRITICAL CARE FIRST HOUR: CPT | Mod: 25

## 2024-05-26 ENCOUNTER — APPOINTMENT (OUTPATIENT)
Dept: CT IMAGING | Facility: CLINIC | Age: 89
DRG: 372 | End: 2024-05-26
Attending: EMERGENCY MEDICINE
Payer: COMMERCIAL

## 2024-05-26 PROBLEM — R19.7 VOMITING AND DIARRHEA: Status: ACTIVE | Noted: 2024-05-26

## 2024-05-26 PROBLEM — R11.10 VOMITING AND DIARRHEA: Status: ACTIVE | Noted: 2024-05-26

## 2024-05-26 PROBLEM — D64.9 CHRONIC ANEMIA: Status: ACTIVE | Noted: 2024-05-26

## 2024-05-26 PROBLEM — E86.0 DEHYDRATION: Status: ACTIVE | Noted: 2024-05-26

## 2024-05-26 PROBLEM — K52.9 COLITIS: Status: ACTIVE | Noted: 2024-05-26

## 2024-05-26 PROBLEM — R53.1 GENERALIZED WEAKNESS: Status: ACTIVE | Noted: 2019-07-22

## 2024-05-26 LAB
ADV 40+41 DNA STL QL NAA+NON-PROBE: NEGATIVE
ALBUMIN SERPL BCG-MCNC: 3.8 G/DL (ref 3.5–5.2)
ALBUMIN UR-MCNC: NEGATIVE MG/DL
ALP SERPL-CCNC: 289 U/L (ref 40–150)
ALT SERPL W P-5'-P-CCNC: 5 U/L (ref 0–50)
ANION GAP SERPL CALCULATED.3IONS-SCNC: 11 MMOL/L (ref 7–15)
ANION GAP SERPL CALCULATED.3IONS-SCNC: 15 MMOL/L (ref 7–15)
APPEARANCE UR: ABNORMAL
AST SERPL W P-5'-P-CCNC: 16 U/L (ref 0–45)
ASTRO TYP 1-8 RNA STL QL NAA+NON-PROBE: NEGATIVE
BACTERIA #/AREA URNS HPF: ABNORMAL /HPF
BASOPHILS # BLD AUTO: 0.1 10E3/UL (ref 0–0.2)
BASOPHILS # BLD AUTO: 0.1 10E3/UL (ref 0–0.2)
BASOPHILS NFR BLD AUTO: 0 %
BASOPHILS NFR BLD AUTO: 0 %
BILIRUB SERPL-MCNC: 0.8 MG/DL
BILIRUB UR QL STRIP: NEGATIVE
BUN SERPL-MCNC: 35 MG/DL (ref 8–23)
BUN SERPL-MCNC: 41.1 MG/DL (ref 8–23)
C CAYETANENSIS DNA STL QL NAA+NON-PROBE: NEGATIVE
C DIFF GDH STL QL IA: POSITIVE
C DIFF TOX A+B STL QL IA: NEGATIVE
C DIFF TOX B STL QL: POSITIVE
CALCIUM SERPL-MCNC: 10.4 MG/DL (ref 8.2–9.6)
CALCIUM SERPL-MCNC: 9.1 MG/DL (ref 8.2–9.6)
CAMPYLOBACTER DNA SPEC NAA+PROBE: NEGATIVE
CHLORIDE SERPL-SCNC: 104 MMOL/L (ref 98–107)
CHLORIDE SERPL-SCNC: 99 MMOL/L (ref 98–107)
COLOR UR AUTO: YELLOW
CREAT SERPL-MCNC: 0.65 MG/DL (ref 0.51–0.95)
CREAT SERPL-MCNC: 0.8 MG/DL (ref 0.51–0.95)
CRYPTOSP DNA STL QL NAA+NON-PROBE: NEGATIVE
DEPRECATED HCO3 PLAS-SCNC: 19 MMOL/L (ref 22–29)
DEPRECATED HCO3 PLAS-SCNC: 20 MMOL/L (ref 22–29)
E COLI O157 DNA STL QL NAA+NON-PROBE: NORMAL
E HISTOLYT DNA STL QL NAA+NON-PROBE: NEGATIVE
EAEC ASTA GENE ISLT QL NAA+PROBE: NEGATIVE
EC STX1+STX2 GENES STL QL NAA+NON-PROBE: NEGATIVE
EGFRCR SERPLBLD CKD-EPI 2021: 68 ML/MIN/1.73M2
EGFRCR SERPLBLD CKD-EPI 2021: 82 ML/MIN/1.73M2
EOSINOPHIL # BLD AUTO: 0 10E3/UL (ref 0–0.7)
EOSINOPHIL # BLD AUTO: 0.1 10E3/UL (ref 0–0.7)
EOSINOPHIL NFR BLD AUTO: 0 %
EOSINOPHIL NFR BLD AUTO: 0 %
EPEC EAE GENE STL QL NAA+NON-PROBE: NEGATIVE
ERYTHROCYTE [DISTWIDTH] IN BLOOD BY AUTOMATED COUNT: 17.8 % (ref 10–15)
ERYTHROCYTE [DISTWIDTH] IN BLOOD BY AUTOMATED COUNT: 18 % (ref 10–15)
ETEC LTA+ST1A+ST1B TOX ST NAA+NON-PROBE: NEGATIVE
G LAMBLIA DNA STL QL NAA+NON-PROBE: NEGATIVE
GLUCOSE SERPL-MCNC: 121 MG/DL (ref 70–99)
GLUCOSE SERPL-MCNC: 143 MG/DL (ref 70–99)
GLUCOSE UR STRIP-MCNC: NEGATIVE MG/DL
HCT VFR BLD AUTO: 29.2 % (ref 35–47)
HCT VFR BLD AUTO: 32.9 % (ref 35–47)
HGB BLD-MCNC: 10.8 G/DL (ref 11.7–15.7)
HGB BLD-MCNC: 9.4 G/DL (ref 11.7–15.7)
HGB UR QL STRIP: ABNORMAL
HYALINE CASTS: 4 /LPF
IMM GRANULOCYTES # BLD: 0.1 10E3/UL
IMM GRANULOCYTES # BLD: 0.4 10E3/UL
IMM GRANULOCYTES NFR BLD: 1 %
IMM GRANULOCYTES NFR BLD: 1 %
KETONES UR STRIP-MCNC: NEGATIVE MG/DL
LACTATE SERPL-SCNC: 1.6 MMOL/L (ref 0.7–2)
LEUKOCYTE ESTERASE UR QL STRIP: ABNORMAL
LIPASE SERPL-CCNC: 26 U/L (ref 13–60)
LYMPHOCYTES # BLD AUTO: 0.9 10E3/UL (ref 0.8–5.3)
LYMPHOCYTES # BLD AUTO: 1.1 10E3/UL (ref 0.8–5.3)
LYMPHOCYTES NFR BLD AUTO: 4 %
LYMPHOCYTES NFR BLD AUTO: 4 %
MAGNESIUM SERPL-MCNC: 2.3 MG/DL (ref 1.7–2.3)
MCH RBC QN AUTO: 33.2 PG (ref 26.5–33)
MCH RBC QN AUTO: 33.2 PG (ref 26.5–33)
MCHC RBC AUTO-ENTMCNC: 32.2 G/DL (ref 31.5–36.5)
MCHC RBC AUTO-ENTMCNC: 32.8 G/DL (ref 31.5–36.5)
MCV RBC AUTO: 101 FL (ref 78–100)
MCV RBC AUTO: 103 FL (ref 78–100)
MONOCYTES # BLD AUTO: 0.9 10E3/UL (ref 0–1.3)
MONOCYTES # BLD AUTO: 1.6 10E3/UL (ref 0–1.3)
MONOCYTES NFR BLD AUTO: 4 %
MONOCYTES NFR BLD AUTO: 6 %
NEUTROPHILS # BLD AUTO: 19.6 10E3/UL (ref 1.6–8.3)
NEUTROPHILS # BLD AUTO: 25.6 10E3/UL (ref 1.6–8.3)
NEUTROPHILS NFR BLD AUTO: 89 %
NEUTROPHILS NFR BLD AUTO: 91 %
NITRATE UR QL: NEGATIVE
NOROVIRUS GI+II RNA STL QL NAA+NON-PROBE: NEGATIVE
NRBC # BLD AUTO: 0 10E3/UL
NRBC # BLD AUTO: 0.1 10E3/UL
NRBC BLD AUTO-RTO: 0 /100
NRBC BLD AUTO-RTO: 0 /100
P SHIGELLOIDES DNA STL QL NAA+NON-PROBE: NEGATIVE
PH UR STRIP: 5 [PH] (ref 5–7)
PLATELET # BLD AUTO: 296 10E3/UL (ref 150–450)
PLATELET # BLD AUTO: 421 10E3/UL (ref 150–450)
POTASSIUM SERPL-SCNC: 4.8 MMOL/L (ref 3.4–5.3)
POTASSIUM SERPL-SCNC: 5.1 MMOL/L (ref 3.4–5.3)
PROT SERPL-MCNC: 7.5 G/DL (ref 6.4–8.3)
RBC # BLD AUTO: 2.83 10E6/UL (ref 3.8–5.2)
RBC # BLD AUTO: 3.25 10E6/UL (ref 3.8–5.2)
RBC URINE: 10 /HPF
RVA RNA STL QL NAA+NON-PROBE: NEGATIVE
SALMONELLA SP RPOD STL QL NAA+PROBE: NEGATIVE
SAPO I+II+IV+V RNA STL QL NAA+NON-PROBE: NEGATIVE
SHIGELLA SP+EIEC IPAH ST NAA+NON-PROBE: NEGATIVE
SODIUM SERPL-SCNC: 134 MMOL/L (ref 135–145)
SODIUM SERPL-SCNC: 134 MMOL/L (ref 135–145)
SP GR UR STRIP: 1.02 (ref 1–1.03)
SQUAMOUS EPITHELIAL: 8 /HPF
UROBILINOGEN UR STRIP-MCNC: NORMAL MG/DL
V CHOLERAE DNA SPEC QL NAA+PROBE: NEGATIVE
VIBRIO DNA SPEC NAA+PROBE: NEGATIVE
WBC # BLD AUTO: 21.6 10E3/UL (ref 4–11)
WBC # BLD AUTO: 28.8 10E3/UL (ref 4–11)
WBC URINE: 59 /HPF
Y ENTEROCOL DNA STL QL NAA+PROBE: NEGATIVE

## 2024-05-26 PROCEDURE — 36415 COLL VENOUS BLD VENIPUNCTURE: CPT | Performed by: EMERGENCY MEDICINE

## 2024-05-26 PROCEDURE — 87324 CLOSTRIDIUM AG IA: CPT | Performed by: EMERGENCY MEDICINE

## 2024-05-26 PROCEDURE — 83690 ASSAY OF LIPASE: CPT | Performed by: EMERGENCY MEDICINE

## 2024-05-26 PROCEDURE — 83605 ASSAY OF LACTIC ACID: CPT | Performed by: EMERGENCY MEDICINE

## 2024-05-26 PROCEDURE — 71260 CT THORAX DX C+: CPT

## 2024-05-26 PROCEDURE — 250N000013 HC RX MED GY IP 250 OP 250 PS 637: Performed by: INTERNAL MEDICINE

## 2024-05-26 PROCEDURE — 36415 COLL VENOUS BLD VENIPUNCTURE: CPT | Performed by: INTERNAL MEDICINE

## 2024-05-26 PROCEDURE — 85025 COMPLETE CBC W/AUTO DIFF WBC: CPT | Performed by: INTERNAL MEDICINE

## 2024-05-26 PROCEDURE — 250N000011 HC RX IP 250 OP 636: Performed by: EMERGENCY MEDICINE

## 2024-05-26 PROCEDURE — 99223 1ST HOSP IP/OBS HIGH 75: CPT | Performed by: INTERNAL MEDICINE

## 2024-05-26 PROCEDURE — 87086 URINE CULTURE/COLONY COUNT: CPT | Performed by: EMERGENCY MEDICINE

## 2024-05-26 PROCEDURE — 96374 THER/PROPH/DIAG INJ IV PUSH: CPT | Mod: 59

## 2024-05-26 PROCEDURE — 250N000013 HC RX MED GY IP 250 OP 250 PS 637: Performed by: EMERGENCY MEDICINE

## 2024-05-26 PROCEDURE — 81001 URINALYSIS AUTO W/SCOPE: CPT | Performed by: EMERGENCY MEDICINE

## 2024-05-26 PROCEDURE — 80053 COMPREHEN METABOLIC PANEL: CPT | Performed by: EMERGENCY MEDICINE

## 2024-05-26 PROCEDURE — 120N000001 HC R&B MED SURG/OB

## 2024-05-26 PROCEDURE — 258N000003 HC RX IP 258 OP 636: Performed by: INTERNAL MEDICINE

## 2024-05-26 PROCEDURE — 87507 IADNA-DNA/RNA PROBE TQ 12-25: CPT | Performed by: EMERGENCY MEDICINE

## 2024-05-26 PROCEDURE — 83735 ASSAY OF MAGNESIUM: CPT | Performed by: INTERNAL MEDICINE

## 2024-05-26 PROCEDURE — 85025 COMPLETE CBC W/AUTO DIFF WBC: CPT | Performed by: EMERGENCY MEDICINE

## 2024-05-26 PROCEDURE — 87186 SC STD MICRODIL/AGAR DIL: CPT | Performed by: EMERGENCY MEDICINE

## 2024-05-26 PROCEDURE — 96361 HYDRATE IV INFUSION ADD-ON: CPT

## 2024-05-26 PROCEDURE — 250N000009 HC RX 250: Performed by: EMERGENCY MEDICINE

## 2024-05-26 PROCEDURE — 96375 TX/PRO/DX INJ NEW DRUG ADDON: CPT

## 2024-05-26 PROCEDURE — 87493 C DIFF AMPLIFIED PROBE: CPT | Performed by: EMERGENCY MEDICINE

## 2024-05-26 PROCEDURE — 258N000003 HC RX IP 258 OP 636: Performed by: EMERGENCY MEDICINE

## 2024-05-26 PROCEDURE — 99207 PR NO BILLABLE SERVICE THIS VISIT: CPT | Performed by: INTERNAL MEDICINE

## 2024-05-26 RX ORDER — ALBUTEROL SULFATE 0.83 MG/ML
2.5 SOLUTION RESPIRATORY (INHALATION)
Status: DISCONTINUED | OUTPATIENT
Start: 2024-05-26 | End: 2024-05-26

## 2024-05-26 RX ORDER — ONDANSETRON 2 MG/ML
4 INJECTION INTRAMUSCULAR; INTRAVENOUS EVERY 6 HOURS PRN
Status: DISCONTINUED | OUTPATIENT
Start: 2024-05-26 | End: 2024-05-31 | Stop reason: HOSPADM

## 2024-05-26 RX ORDER — SERTRALINE HYDROCHLORIDE 100 MG/1
100 TABLET, FILM COATED ORAL DAILY
Status: DISCONTINUED | OUTPATIENT
Start: 2024-05-26 | End: 2024-05-31 | Stop reason: HOSPADM

## 2024-05-26 RX ORDER — HYDROMORPHONE HYDROCHLORIDE 2 MG/1
2 TABLET ORAL EVERY 6 HOURS PRN
Status: ON HOLD | COMMUNITY
End: 2024-05-30

## 2024-05-26 RX ORDER — VANCOMYCIN HYDROCHLORIDE 125 MG/1
125 CAPSULE ORAL ONCE
Status: COMPLETED | OUTPATIENT
Start: 2024-05-26 | End: 2024-05-26

## 2024-05-26 RX ORDER — ONDANSETRON 2 MG/ML
4 INJECTION INTRAMUSCULAR; INTRAVENOUS ONCE
Status: COMPLETED | OUTPATIENT
Start: 2024-05-26 | End: 2024-05-26

## 2024-05-26 RX ORDER — ONDANSETRON 4 MG/1
4 TABLET, ORALLY DISINTEGRATING ORAL EVERY 6 HOURS PRN
Status: DISCONTINUED | OUTPATIENT
Start: 2024-05-26 | End: 2024-05-31 | Stop reason: HOSPADM

## 2024-05-26 RX ORDER — ACETAMINOPHEN 500 MG
1000 TABLET ORAL 3 TIMES DAILY
COMMUNITY

## 2024-05-26 RX ORDER — PANTOPRAZOLE SODIUM 40 MG/1
40 TABLET, DELAYED RELEASE ORAL
Status: DISCONTINUED | OUTPATIENT
Start: 2024-05-26 | End: 2024-05-27

## 2024-05-26 RX ORDER — ALBUTEROL SULFATE 0.83 MG/ML
2.5 SOLUTION RESPIRATORY (INHALATION)
Status: DISCONTINUED | OUTPATIENT
Start: 2024-05-26 | End: 2024-05-31 | Stop reason: HOSPADM

## 2024-05-26 RX ORDER — AMOXICILLIN 250 MG
2 CAPSULE ORAL 2 TIMES DAILY PRN
Status: DISCONTINUED | OUTPATIENT
Start: 2024-05-26 | End: 2024-05-31 | Stop reason: HOSPADM

## 2024-05-26 RX ORDER — CARVEDILOL 12.5 MG/1
12.5 TABLET ORAL 2 TIMES DAILY WITH MEALS
Status: DISCONTINUED | OUTPATIENT
Start: 2024-05-26 | End: 2024-05-31 | Stop reason: HOSPADM

## 2024-05-26 RX ORDER — ISOSORBIDE DINITRATE 10 MG/1
10 TABLET ORAL
Status: DISCONTINUED | OUTPATIENT
Start: 2024-05-26 | End: 2024-05-31 | Stop reason: HOSPADM

## 2024-05-26 RX ORDER — METHOCARBAMOL 500 MG/1
500 TABLET, FILM COATED ORAL EVERY 6 HOURS PRN
COMMUNITY
End: 2024-06-04

## 2024-05-26 RX ORDER — NALOXONE HYDROCHLORIDE 0.4 MG/ML
0.4 INJECTION, SOLUTION INTRAMUSCULAR; INTRAVENOUS; SUBCUTANEOUS
Status: DISCONTINUED | OUTPATIENT
Start: 2024-05-26 | End: 2024-05-31 | Stop reason: HOSPADM

## 2024-05-26 RX ORDER — CALCIUM CARBONATE 500 MG/1
1000 TABLET, CHEWABLE ORAL 4 TIMES DAILY PRN
Status: DISCONTINUED | OUTPATIENT
Start: 2024-05-26 | End: 2024-05-31 | Stop reason: HOSPADM

## 2024-05-26 RX ORDER — AMOXICILLIN 250 MG
1 CAPSULE ORAL 2 TIMES DAILY PRN
Status: DISCONTINUED | OUTPATIENT
Start: 2024-05-26 | End: 2024-05-31 | Stop reason: HOSPADM

## 2024-05-26 RX ORDER — NALOXONE HYDROCHLORIDE 0.4 MG/ML
0.2 INJECTION, SOLUTION INTRAMUSCULAR; INTRAVENOUS; SUBCUTANEOUS
Status: DISCONTINUED | OUTPATIENT
Start: 2024-05-26 | End: 2024-05-31 | Stop reason: HOSPADM

## 2024-05-26 RX ORDER — HYDROMORPHONE HYDROCHLORIDE 2 MG/1
2 TABLET ORAL DAILY
Status: ON HOLD | COMMUNITY
End: 2024-05-30

## 2024-05-26 RX ORDER — FUROSEMIDE 20 MG
20 TABLET ORAL DAILY
Status: DISCONTINUED | OUTPATIENT
Start: 2024-05-26 | End: 2024-05-31 | Stop reason: HOSPADM

## 2024-05-26 RX ORDER — SODIUM CHLORIDE 9 MG/ML
INJECTION, SOLUTION INTRAVENOUS CONTINUOUS
Status: DISCONTINUED | OUTPATIENT
Start: 2024-05-26 | End: 2024-05-27

## 2024-05-26 RX ORDER — VANCOMYCIN HYDROCHLORIDE 125 MG/1
125 CAPSULE ORAL 4 TIMES DAILY
Status: DISCONTINUED | OUTPATIENT
Start: 2024-05-26 | End: 2024-05-31 | Stop reason: HOSPADM

## 2024-05-26 RX ORDER — AMLODIPINE BESYLATE 10 MG/1
10 TABLET ORAL DAILY
Status: DISCONTINUED | OUTPATIENT
Start: 2024-05-26 | End: 2024-05-31 | Stop reason: HOSPADM

## 2024-05-26 RX ORDER — LIDOCAINE 40 MG/G
CREAM TOPICAL
Status: DISCONTINUED | OUTPATIENT
Start: 2024-05-26 | End: 2024-05-31 | Stop reason: HOSPADM

## 2024-05-26 RX ORDER — NYSTATIN 100000 U/G
CREAM TOPICAL 2 TIMES DAILY PRN
Status: DISCONTINUED | OUTPATIENT
Start: 2024-05-26 | End: 2024-05-31 | Stop reason: HOSPADM

## 2024-05-26 RX ORDER — ASPIRIN 325 MG
325 TABLET, DELAYED RELEASE (ENTERIC COATED) ORAL DAILY
Status: DISCONTINUED | OUTPATIENT
Start: 2024-05-26 | End: 2024-05-30

## 2024-05-26 RX ORDER — ACETAMINOPHEN 325 MG/1
975 TABLET ORAL 3 TIMES DAILY
Status: DISCONTINUED | OUTPATIENT
Start: 2024-05-26 | End: 2024-05-31 | Stop reason: HOSPADM

## 2024-05-26 RX ORDER — METHOCARBAMOL 500 MG/1
500 TABLET, FILM COATED ORAL EVERY 6 HOURS PRN
Status: DISCONTINUED | OUTPATIENT
Start: 2024-05-26 | End: 2024-05-31 | Stop reason: HOSPADM

## 2024-05-26 RX ORDER — OMEPRAZOLE
20 KIT
Status: DISCONTINUED | OUTPATIENT
Start: 2024-05-26 | End: 2024-05-26

## 2024-05-26 RX ORDER — HYDRALAZINE HYDROCHLORIDE 10 MG/1
10 TABLET, FILM COATED ORAL EVERY 4 HOURS PRN
Status: DISCONTINUED | OUTPATIENT
Start: 2024-05-26 | End: 2024-05-31 | Stop reason: HOSPADM

## 2024-05-26 RX ORDER — HYDRALAZINE HYDROCHLORIDE 20 MG/ML
10 INJECTION INTRAMUSCULAR; INTRAVENOUS EVERY 4 HOURS PRN
Status: DISCONTINUED | OUTPATIENT
Start: 2024-05-26 | End: 2024-05-31 | Stop reason: HOSPADM

## 2024-05-26 RX ORDER — HALOPERIDOL 5 MG/ML
5 INJECTION INTRAMUSCULAR EVERY 6 HOURS PRN
Status: DISCONTINUED | OUTPATIENT
Start: 2024-05-26 | End: 2024-05-26

## 2024-05-26 RX ORDER — MORPHINE SULFATE 2 MG/ML
2 INJECTION, SOLUTION INTRAMUSCULAR; INTRAVENOUS EVERY 4 HOURS PRN
Status: DISCONTINUED | OUTPATIENT
Start: 2024-05-26 | End: 2024-05-31 | Stop reason: HOSPADM

## 2024-05-26 RX ORDER — CLONIDINE HYDROCHLORIDE 0.1 MG/1
0.1 TABLET ORAL AT BEDTIME
Status: DISCONTINUED | OUTPATIENT
Start: 2024-05-26 | End: 2024-05-31 | Stop reason: HOSPADM

## 2024-05-26 RX ORDER — HALOPERIDOL 5 MG/ML
5 INJECTION INTRAMUSCULAR ONCE
Status: DISCONTINUED | OUTPATIENT
Start: 2024-05-26 | End: 2024-05-26

## 2024-05-26 RX ORDER — IOPAMIDOL 755 MG/ML
500 INJECTION, SOLUTION INTRAVASCULAR ONCE
Status: COMPLETED | OUTPATIENT
Start: 2024-05-26 | End: 2024-05-26

## 2024-05-26 RX ADMIN — ISOSORBIDE DINITRATE 10 MG: 10 TABLET ORAL at 17:46

## 2024-05-26 RX ADMIN — VANCOMYCIN HYDROCHLORIDE 125 MG: 125 CAPSULE ORAL at 17:07

## 2024-05-26 RX ADMIN — METHOCARBAMOL 500 MG: 500 TABLET ORAL at 21:27

## 2024-05-26 RX ADMIN — ACETAMINOPHEN 975 MG: 325 TABLET, FILM COATED ORAL at 11:41

## 2024-05-26 RX ADMIN — IOPAMIDOL 59 ML: 755 INJECTION, SOLUTION INTRAVENOUS at 03:24

## 2024-05-26 RX ADMIN — METHOCARBAMOL 500 MG: 500 TABLET ORAL at 15:04

## 2024-05-26 RX ADMIN — ISOSORBIDE DINITRATE 10 MG: 10 TABLET ORAL at 13:33

## 2024-05-26 RX ADMIN — VANCOMYCIN HYDROCHLORIDE 125 MG: 125 CAPSULE ORAL at 09:14

## 2024-05-26 RX ADMIN — AMLODIPINE BESYLATE 10 MG: 10 TABLET ORAL at 11:45

## 2024-05-26 RX ADMIN — VANCOMYCIN HYDROCHLORIDE 125 MG: 125 CAPSULE ORAL at 20:40

## 2024-05-26 RX ADMIN — SODIUM CHLORIDE: 9 INJECTION, SOLUTION INTRAVENOUS at 17:06

## 2024-05-26 RX ADMIN — LISINOPRIL 30 MG: 20 TABLET ORAL at 11:45

## 2024-05-26 RX ADMIN — ASPIRIN 325 MG: 325 TABLET ORAL at 11:41

## 2024-05-26 RX ADMIN — ACETAMINOPHEN 975 MG: 325 TABLET, FILM COATED ORAL at 17:07

## 2024-05-26 RX ADMIN — PANTOPRAZOLE SODIUM 40 MG: 40 TABLET, DELAYED RELEASE ORAL at 11:44

## 2024-05-26 RX ADMIN — VANCOMYCIN HYDROCHLORIDE 125 MG: 125 CAPSULE ORAL at 13:33

## 2024-05-26 RX ADMIN — VANCOMYCIN HYDROCHLORIDE 125 MG: 125 CAPSULE ORAL at 05:25

## 2024-05-26 RX ADMIN — SERTRALINE HYDROCHLORIDE 100 MG: 100 TABLET ORAL at 11:44

## 2024-05-26 RX ADMIN — HALOPERIDOL LACTATE 5 MG: 5 INJECTION, SOLUTION INTRAMUSCULAR at 06:23

## 2024-05-26 RX ADMIN — NYSTATIN: 100000 CREAM TOPICAL at 14:59

## 2024-05-26 RX ADMIN — SODIUM CHLORIDE 55 ML: 9 INJECTION, SOLUTION INTRAVENOUS at 03:24

## 2024-05-26 RX ADMIN — SODIUM CHLORIDE: 9 INJECTION, SOLUTION INTRAVENOUS at 06:54

## 2024-05-26 RX ADMIN — SODIUM CHLORIDE 1000 ML: 9 INJECTION, SOLUTION INTRAVENOUS at 00:19

## 2024-05-26 RX ADMIN — ONDANSETRON 4 MG: 2 INJECTION INTRAMUSCULAR; INTRAVENOUS at 00:20

## 2024-05-26 RX ADMIN — SODIUM CHLORIDE 1000 ML: 9 INJECTION, SOLUTION INTRAVENOUS at 01:38

## 2024-05-26 ASSESSMENT — ACTIVITIES OF DAILY LIVING (ADL)
ADLS_ACUITY_SCORE: 39
ADLS_ACUITY_SCORE: 58
ADLS_ACUITY_SCORE: 39
ADLS_ACUITY_SCORE: 48
ADLS_ACUITY_SCORE: 39
ADLS_ACUITY_SCORE: 54
ADLS_ACUITY_SCORE: 39
ADLS_ACUITY_SCORE: 39
ADLS_ACUITY_SCORE: 40
ADLS_ACUITY_SCORE: 39
ADLS_ACUITY_SCORE: 58
ADLS_ACUITY_SCORE: 39
ADLS_ACUITY_SCORE: 48
ADLS_ACUITY_SCORE: 58
ADLS_ACUITY_SCORE: 54
ADLS_ACUITY_SCORE: 39
ADLS_ACUITY_SCORE: 48
ADLS_ACUITY_SCORE: 58

## 2024-05-26 NOTE — PLAN OF CARE
Goal Outcome Evaluation:      Plan of Care Reviewed With: patient    Overall Patient Progress: no changeOverall Patient Progress: no change    Outcome Evaluation: Alert to self, stool sample sent to test for C Diff and an enteric panel, 2L NC, difficult to adequately asses pain and she consistently rates abd pain high but then rest comfortably in between cares, Tylenol/Robaxin given, using essential oils and a heating pad also, refused OOB but would be a lift as she is NWB while her L tib/fib fx heals, additionally needs a boot on that L fooot when OOB but it did not come with her to the hospital, incisions C/D/I, incontinent b/b, reddened kayce area, Nystatin cream applied, mepilex applied to scrapes to L groin and back, son updated at b/s.    Problem: Adult Inpatient Plan of Care  Goal: Plan of Care Review  Description: The Plan of Care Review/Shift note should be completed every shift.  The Outcome Evaluation is a brief statement about your assessment that the patient is improving, declining, or no change.  This information will be displayed automatically on your shift  note.  Outcome: Progressing  Flowsheets (Taken 5/26/2024 1605)  Outcome Evaluation: Alert to self, stool sample sent to test for C Diff and an enteric panel, 2L NC, difficult to adequately asses pain and she consistently rates abd pain high but then rest comfortably in between cares, Tylenol/Robaxin given, using essential oils and a heating pad also, refused OOB but would be a lift as she is NWB while her L tib/fib fx heals, additionally needs a boot on that L fooot when OOB but it did not come with her to the hospital, incisions C/D/I, incontinent b/b, reddened kayce area, Nystatin cream applied, mepilex applied to scrapes to L groin and back, son updated at b/s.  Plan of Care Reviewed With: patient  Overall Patient Progress: no change  Goal: Patient-Specific Goal (Individualized)  Description: You can add care plan individualizations to a care  "plan. Examples of Individualization might be:  \"Parent requests to be called daily at 9am for status\", \"I have a hard time hearing out of my right ear\", or \"Do not touch me to wake me up as it startles  me\".  Outcome: Progressing  Goal: Absence of Hospital-Acquired Illness or Injury  Outcome: Progressing  Intervention: Identify and Manage Fall Risk  Recent Flowsheet Documentation  Taken 5/26/2024 0915 by Lydia Lawton RN  Safety Promotion/Fall Prevention:   activity supervised   assistive device/personal items within reach   clutter free environment maintained   lighting adjusted   nonskid shoes/slippers when out of bed   patient and family education   room organization consistent   safety round/check completed   supervised activity   treat reversible contributory factors   treat underlying cause  Intervention: Prevent Skin Injury  Recent Flowsheet Documentation  Taken 5/26/2024 0915 by Lydia Lawton RN  Body Position:   weight shifting   turned   right   supine, head elevated   side-lying  Skin Protection: adhesive use limited  Device Skin Pressure Protection:   adhesive use limited   tubing/devices free from skin contact  Intervention: Prevent and Manage VTE (Venous Thromboembolism) Risk  Recent Flowsheet Documentation  Taken 5/26/2024 0915 by Lydia Lawton RN  VTE Prevention/Management: SCDs (sequential compression devices) on  Intervention: Prevent Infection  Recent Flowsheet Documentation  Taken 5/26/2024 0915 by Lydia Lawton RN  Infection Prevention:   hand hygiene promoted   personal protective equipment utilized   rest/sleep promoted   single patient room provided  Goal: Optimal Comfort and Wellbeing  Outcome: Progressing  Intervention: Monitor Pain and Promote Comfort  Recent Flowsheet Documentation  Taken 5/26/2024 1141 by Lydia Lawton RN  Pain Management Interventions: medication (see MAR)  Taken 5/26/2024 0915 by Lydia Lawton RN  Pain Management Interventions: (Paged Dr Carpenter that pt " asking for a pain pill)   MD notified (comment)   essential oils   heat applied  Goal: Readiness for Transition of Care  Outcome: Progressing     Problem: Gastroenteritis  Goal: Effective Diarrhea Management  Outcome: Progressing  Intervention: Manage Diarrhea  Recent Flowsheet Documentation  Taken 5/26/2024 0915 by Lydia Lawton RN  Skin Protection: adhesive use limited  Perineal Care:   protective cream/ointment applied   perineum cleansed   absorbent brief/pad changed  Goal: Fluid and Electrolyte Balance  Outcome: Progressing  Goal: Nausea and Vomiting Relief  Outcome: Progressing     Problem: Comorbidity Management  Goal: Maintenance of Behavioral Health Symptom Control  Outcome: Progressing  Intervention: Maintain Behavioral Health Symptom Control  Recent Flowsheet Documentation  Taken 5/26/2024 0915 by Lydia Lawton, RN  Medication Review/Management: medications reviewed  Goal: Blood Pressure in Desired Range  Outcome: Progressing  Intervention: Maintain Blood Pressure Management  Recent Flowsheet Documentation  Taken 5/26/2024 0915 by Lydia Lawton, RN  Medication Review/Management: medications reviewed

## 2024-05-26 NOTE — ED PROVIDER NOTES
Emergency Department Note      History of Present Illness     Chief Complaint  Nausea, Vomiting, & Diarrhea    HPI  Daya Ignacio is a 89 year old female about 3 weeks s/p left lower extremity ORIF (5/3/24) with history of recurrent UTI, NSTEMI, upper GI bleed, nephrolithiasis, C. Diff colitis, and hypertension who presents to the ED via EMS from TCU with her son for evaluation of nausea, vomiting, and diarrhea. Patient's son received a call from patient's TCU alerting him to Daya's vomiting and diarrhea. Staff were concerned for dehydration. EMS administered 4 mg Zofran en route and she was given a Zofran tablet at her facility. Patient reports abdominal pain. She is unsure of blood in vomit or stool. Denies fevers. Patient has PRN oxygen and was discharged on 2 liters from her recent admission. Appears pale and tired at the time of evaluation.  Patient's son reports Daya broke her leg in two places, requiring ORIF and hospitalization. She had a follow up appointment with orthopedics 2 weeks ago and has started physical therapy at TCU. He states today's symptoms are new. Reports patient does not respond well to antibiotics previously prescribed for recurrent UTI.         Patient does have baseline confusion.     Independent Historian  Son as detailed above.    Review of External Notes  I reviewed Dr. Dumont's 5/8/24 Discharge Summary. Patient was admitted for 1 week (5/1 - 5/8) for an unwitnessed fall. She had left lower extremity orthopedic surgery.     I reviewed Care Everywhere and updated Epic.     Past Medical History   Medical History and Problem List  Past Medical History:   Diagnosis Date    Anemia     Anxiety     Arthritis     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 (H28) 04/16/2014    Chronic low back pain     Depression     h/o Clostridium difficile colitis 06/2019    h/o Mumps     Hypertension     Kidney stone     Lumbar compression fracture     Macular degeneration (senile) of retina,  unspecified     Mild cognitive impairment     NSTEMI (non-ST elevated myocardial infarction) (H) 05/28/2017    Recurrent UTI     Seasonal allergic rhinitis     Upper GI bleed 06/2019    Urinary tract infection due to extended-spectrum beta lactamase (ESBL)-producing Klebsiella 05/2019       Medications  albuterol (PROVENTIL) (2.5 MG/3ML) 0.083% neb solution  amLODIPine (NORVASC) 10 MG tablet  aspirin (ASA) 325 MG tablet  carvedilol (COREG) 12.5 MG tablet  Cholecalciferol (VITAMIN D3) 2000 UNITS CAPS  cloNIDine (CATAPRES) 0.1 MG tablet  cyanocobalamin (VITAMIN B-12) 1000 MCG tablet  Ferrous Gluconate 324 (37.5 Fe) MG TABS  furosemide (LASIX) 20 MG tablet  guaiFENesin (MUCINEX) 600 MG 12 hr tablet  isosorbide dinitrate (ISORDIL) 10 MG tablet  lisinopril (ZESTRIL) 30 MG tablet  methocarbamol (ROBAXIN) 500 MG tablet  Multiple Vitamins-Minerals (PRESERVISION AREDS) CAPS  nystatin (MYCOSTATIN) 202902 UNIT/GM external cream  omeprazole (PRILOSEC) 20 MG DR capsule  ondansetron (ZOFRAN) 4 MG tablet  polyethylene glycol (MIRALAX/GLYCOLAX) packet  potassium chloride ER (K-TAB/KLOR-CON) 10 MEQ CR tablet  senna-docusate (SENOKOT-S/PERICOLACE) 8.6-50 MG tablet  sertraline (ZOLOFT) 100 MG tablet        Surgical History   Past Surgical History:   Procedure Laterality Date    ARTHROPLASTY HIP Right     Cataract removal NOS Bilateral     COMBINED CYSTOSCOPY, INSERT STENT URETER(S) Left 05/06/2020    Procedure: Cystoscopy with left ureteral stent insertion;  Surgeon: Gaurav Munson MD;  Location:  OR    CYSTOSCOPY N/A 08/01/2019    Procedure: Exam under anesthesia, video cystopanendoscopy;  Surgeon: Dennis Carlson MD;  Location:  OR    ESOPHAGOSCOPY, GASTROSCOPY, DUODENOSCOPY (EGD), COMBINED N/A 06/11/2019    Procedure: ESOPHAGOGASTRODUODENOSCOPY (EGD);  Surgeon: Gaurav Degroot MD;  Location:  GI    EXTRACORPOREAL SHOCK WAVE LITHOTRIPSY (ESWL) Left 05/28/2020    Procedure: LEFT EXTRACORPOREAL SHOCK WAVE  LITHOTRIPSY;  Surgeon: Gaurav Munson MD;  Location:  OR    LASER HOLMIUM LITHOTRIPSY URETER(S), INSERT STENT, COMBINED Left 10/30/2020    Procedure: Cystoscopy, left ureteral stent exchange, left retrograde pyelogram, interpretation of fluoroscopic images, left ureteroscopy with holmium lithotripsy and stone basketing;  Surgeon: Gaurav Munson MD;  Location: [facility] OR    OPEN REDUCTION INTERNAL FIXATION ANKLE Left 05/03/2024    Procedure: Left Mathias C lateral malleolus fracture open reduction and internal fixation with retrograde fibular nail;  Surgeon: Colin Evans MD;  Location: [facility] OR    OPEN REDUCTION INTERNAL FIXATION HIP Left     OPEN REDUCTION INTERNAL FIXATION RODDING INTRAMEDULLARY TIBIA Left 05/03/2024    Procedure: Left tibia shaft open reduction and internal fixation with intramedullary nail;  Surgeon: Colin Evans MD;  Location: [facility] OR    OPEN REDUCTION INTERNAL FIXATION TIBIAL PLATEAU Right 08/28/2019    Procedure: Open reduction internal fixation right proximal tibia fracture;  Surgeon: Molina Pardo MD;  Location:  OR    TONSILLECTOMY, ADENOIDECTOMY, COMBINED       Physical Exam   Patient Vitals for the past 24 hrs:   BP Temp Temp src Pulse Resp SpO2 Height Weight   05/26/24 0357 -- -- -- 64 12 93 % -- --   05/26/24 0355 -- -- -- 63 7  86 % -- --   05/26/24 0354 -- -- -- 72 12  86 % -- --   05/26/24 0353 -- -- -- 70 10  86 % -- --   05/26/24 0352 -- -- -- 69 14  86 % -- --   05/26/24 0351 -- -- -- 69 13  86 % -- --   05/26/24 0350 -- -- -- 70 13  87 % -- --   05/26/24 0349 -- -- -- 70 12  88 % -- --   05/26/24 0348 -- -- -- 70  9  88 % -- --   05/26/24 0347 -- -- -- 71 12  88 % -- --   05/26/24 0346 -- -- -- 71 10  89 % -- --   05/26/24 0342 -- -- -- 73 13 91 % -- --   05/26/24 0341 -- -- -- -- 14 -- -- --   05/26/24 0340 132/55 -- -- 74 -- -- -- --   05/26/24 0314 -- -- -- 65 -- 94 % -- --   05/26/24 0313 -- -- -- 61 -- 92 % -- --   05/26/24 0312 -- -- --  "66 -- 90 % -- --   05/26/24 0303 -- -- -- -- -- -- 1.626 m (5' 4\") 53.4 kg (117 lb 11.6 oz)   05/26/24 0300 136/57 -- -- 74 -- -- -- --   05/26/24 0245 136/53 -- -- 71 -- -- -- --   05/26/24 0230  140/55 -- -- 72 -- -- -- --   05/26/24 0215  143/61 -- -- 66 -- -- -- --   05/26/24 0200 138/57 -- -- 65 -- -- -- --   05/26/24 0120 132/71 -- -- 64 -- 91 % -- --   05/26/24 0115 132/71 -- -- 60 -- 92 % -- --   05/26/24 0100 130/52 -- -- 58 -- 92 % -- --   05/26/24 0045  156/64 -- -- 59 -- 90 % -- --   05/26/24 0015 122/56 -- -- 56 -- 91 % -- --   05/26/24 0000 124/54 -- -- 55 -- 94 % -- --   05/25/24 2351 111/55 97.5  F (36.4  C) Oral 56 20 93 % -- --     Physical Exam    Nursing note and vitals reviewed.  Constitutional: Cooperative.  Tired appearing  HENT:   Mouth/Throat: Very dry mucous membranes.  Cardiovascular: Bradycardic rate, regular rhythm and normal heart sounds.  No murmur.  Pulmonary/Chest: Effort normal and breath sounds normal. No respiratory distress. No wheezes. No rales.   Abdominal: Soft. Normal appearance and bowel sounds are normal. No distension. There is mild diffuse tenderness. There is no rigidity and no guarding.   Musculoskeletal: Normal range of motion.   Neurological: Alert.  Strength 4-5 globally.  Oriented x 3  Skin: Skin is warm and dry. No rash noted. Left lower extremity surgical incisions without evidence of infection.   Psychiatric: Normal mood and affect.      Diagnostics   Lab Results   Labs Ordered and Resulted from Time of ED Arrival to Time of ED Departure   COMPREHENSIVE METABOLIC PANEL - Abnormal       Result Value    Sodium 134 (*)     Potassium 5.1      Carbon Dioxide (CO2) 20 (*)     Anion Gap 15      Urea Nitrogen 41.1 (*)     Creatinine 0.80      GFR Estimate 68      Calcium 10.4 (*)     Chloride 99      Glucose 143 (*)     Alkaline Phosphatase 289 (*)     AST 16      ALT 5      Protein Total 7.5      Albumin 3.8      Bilirubin Total 0.8     CBC WITH PLATELETS AND " DIFFERENTIAL - Abnormal    WBC Count 28.8 (*)     RBC Count 3.25 (*)     Hemoglobin 10.8 (*)     Hematocrit 32.9 (*)      (*)     MCH 33.2 (*)     MCHC 32.8      RDW 17.8 (*)     Platelet Count 421      % Neutrophils 89      % Lymphocytes 4      % Monocytes 6      % Eosinophils 0      % Basophils 0      % Immature Granulocytes 1      NRBCs per 100 WBC 0      Absolute Neutrophils 25.6 (*)     Absolute Lymphocytes 1.1      Absolute Monocytes 1.6 (*)     Absolute Eosinophils 0.1      Absolute Basophils 0.1      Absolute Immature Granulocytes 0.4      Absolute NRBCs 0.1     LIPASE - Normal    Lipase 26     LACTIC ACID WHOLE BLOOD - Normal    Lactic Acid 1.6     ROUTINE UA WITH MICROSCOPIC: Pending collection   ENTERIC BACTERIA AND VIRUS PANEL BY PCR: Pending collection   C. DIFFICILE TOXIN B PCR WITH REFLEX TO C. DIFFICILE ANTIGEN AND TOXINS A/B EIA: Pending collection       Imaging  CT Chest/Abdomen/Pelvis w Contrast   Final Result   IMPRESSION:   1.  Moderate colitis, likely infectious or inflammatory.      2.  Proximal esophageal wall thickening, likely secondary to esophagitis.        Independent Interpretation  None    ED Course    Medications Administered  Medications   sodium chloride 0.9% BOLUS 1,000 mL (0 mLs Intravenous Stopped 5/26/24 0253)   ondansetron (ZOFRAN) injection 4 mg (4 mg Intravenous $Given 5/26/24 0020)   sodium chloride 0.9% BOLUS 1,000 mL (0 mLs Intravenous Stopped 5/26/24 0253)     Social Determinants of Health adding to complexity of care  Patient is currently being cared for at a TCU after recovering from a leg surgery      ED Course  ED Course as of 05/26/24 0405   Sat May 25, 2024   3495 I obtained history and examined the patient as noted above.    Sun May 26, 2024   0403 I spoke with Dr. Dos Santos, hospitalist, regarding the patient's history and presentation in the emergency department today.       Medical Decision Making / Diagnosis     SHARATH Ignacio is a 89 year old female  who presents with uncontrolled vomiting diarrhea and generalized weakness while recovering at a TCU.  She is significant dehydrated and is only now starting to make urine after receiving 2 L of IV fluid.  Heart rate and blood pressure have been reassuring.  She has a significant new leukocytosis but no fever.  This may all be reactive to her colitis.  She does have a history of C. difficile and at this time we are waiting for stool collection to send to the lab.  I had a good discussion with the patient's son.  He expressed concerns over bad reactions to antibiotics in the past which she describes as delirium and very much wants to hold on antibiotics unless a source is identified.  Given that she is hemodynamically stable I think this is reasonable.  We are also waiting to collect a urinalysis.  Remainder of her workup including CT is reassuring and she will be admitted to the hospitalist service in stable condition.    After further discussion we will err on the side of treatment with oral vancomycin to cover for constrictive seal given the significant leukocytosis and history of CHF on chart review pending culture result.    Disposition  Patient admitted to the hospital    ICD-10 Codes:    ICD-10-CM    1. Vomiting and diarrhea  R11.10     R19.7       2. Dehydration  E86.0       3. Generalized weakness  R53.1       4. Chronic anemia  D64.9       5. Colitis  K52.9       6. Leukocytosis, unspecified type  D72.829          Scribe Disclosure:  I, Jailene Palmer, am serving as a scribe at 12:09 AM on 5/26/2024 to document services personally performed by German Gold MD based on my observations and the provider's statements to me.        German Gold MD  05/26/24 [id number]       German Gold MD  05/26/24 [id number]

## 2024-05-26 NOTE — PLAN OF CARE
Goal Outcome Evaluation:    Received from ED around 0630 with complaints of N/V/D. Patient is s/p LLE ORIF and was currently residing in TCU for rehab. PIV running NS at 100ml/hr. Vanco started in ED. Baseline confusion noted. O2 at 2LPM via NC. Transferred to bed from stretcher via hover mat. Contact/Enteric precaution initiated. Made comfortable and oriented to call light system.

## 2024-05-26 NOTE — PHARMACY-ADMISSION MEDICATION HISTORY
Pharmacist Admission Medication History    Admission medication history is complete. The information provided in this note is only as accurate as the sources available at the time of the update.    Information Source(s): Facility ([facility]/) medication list/[facility]    Pertinent Information: None    Changes made to PTA medication list:  Added: APAP. Hydromorphone  Deleted: None  Changed: Methocarbamol 500mg TID PRN --> 500mg Q6H PRN    Allergies reviewed with patient and updates made in EHR: no    Medication History Completed By: Parker Langley [facility] 5/26/2024 9:31 AM    PTA Med List   Medication Sig Last Dose    acetaminophen (TYLENOL) 500 MG tablet Take 1,000 mg by mouth 3 times daily 5/25/2024 at 2005    albuterol (PROVENTIL) (2.5 MG/3ML) 0.083% neb solution Take 1 vial (2.5 mg) by nebulization every 4 hours as needed for shortness of breath, wheezing or cough Unknown    amLODIPine (NORVASC) 10 MG tablet Take 10 mg by mouth daily 5/25/2024 at 1110    aspirin (ASA) 325 MG tablet Take 1 tablet (325 mg) by mouth daily 5/25/2024 at 1110    carvedilol (COREG) 12.5 MG tablet Take 12.5 mg by mouth 2 times daily (with meals) 5/25/2024 at 1948    Cholecalciferol (VITAMIN D3) 2000 UNITS CAPS Take 2,000 Units by mouth daily  5/25/2024 at 1110    cloNIDine (CATAPRES) 0.1 MG tablet Take 0.1 mg by mouth at bedtime 5/25/2024 at 1948    cyanocobalamin (VITAMIN B-12) 1000 MCG tablet Take 1,000 mcg by mouth daily 5/25/2024 at 1110    Ferrous Gluconate 324 (37.5 Fe) MG TABS Take 1 tablet by mouth daily 5/25/2024 at 1110    furosemide (LASIX) 20 MG tablet Take 1 tablet (20 mg) by mouth daily 5/25/2024 at 1110    guaiFENesin (MUCINEX) 600 MG 12 hr tablet Take 1 tablet (600 mg) by mouth 2 times daily 5/25/2024 at 1948    HYDROmorphone (DILAUDID) 2 MG tablet Take 2 mg by mouth every 6 hours as needed for pain 5/25/2024 at 2201    HYDROmorphone (DILAUDID) 2 MG tablet Take 2 mg by mouth daily  5/25/2024 at 1110    isosorbide dinitrate (ISORDIL) 10 MG tablet Take 1 tablet (10 mg) by mouth 3 times daily 5/25/2024 at 1948    lisinopril (ZESTRIL) 30 MG tablet Take 30 mg by mouth daily 5/25/2024 at 1110    methocarbamol (ROBAXIN) 500 MG tablet Take 500 mg by mouth every 6 hours as needed for muscle spasms 5/23/2024 at 1403    Multiple Vitamins-Minerals (PRESERVISION AREDS) CAPS Take 1 capsule by mouth 2 times daily  5/25/2024 at 1948    nystatin (MYCOSTATIN) 060658 UNIT/GM external cream Apply topically 2 times daily as needed (rash) Unknown    omeprazole (PRILOSEC) 20 MG DR capsule Take 20 mg by mouth daily  5/25/2024 at 1110    ondansetron (ZOFRAN) 4 MG tablet Take 4 mg by mouth every 8 hours as needed for nausea 5/25/2024 at 2038    polyethylene glycol (MIRALAX/GLYCOLAX) packet Take 17 g by mouth daily as needed for constipation 5/19/2024 at 1402    potassium chloride ER (K-TAB/KLOR-CON) 10 MEQ CR tablet Take 10 mEq by mouth daily 5/25/2024 at 1110    senna-docusate (SENOKOT-S/PERICOLACE) 8.6-50 MG tablet Take 1 tablet by mouth 2 times daily as needed for constipation Unknown    sertraline (ZOLOFT) 100 MG tablet Take 100 mg by mouth daily 5/25/2024 at 1110

## 2024-05-26 NOTE — H&P
[facility]    Hospitalist History and Physical    Name: Daya Ignacio    MRN: [id number]  YOB: 1930    Age: 89 year old  Date of Admission:  5/25/2024  Date of Service (when I saw the patient): 05/26/24    Assessment & Plan   Daya Ignacio is a 89 year old female with PMH including HTN, NSTEMI, MDD, anxiety, mild cognitive impairment, anemia, chronic back pain from compression fractures, UTI, GI bleed, nephrolithiasis, C. difficile, and falls with previous fractures including right tib/fib, pubic rami, and hip who presents from her TCU where she was rehabbing after recent ORIF surgery discharged on 5/8 presented with vomiting and diarrhea for 2 days workup in the emergency room showed significant leukocytosis of 28,000.  CT chest abdomen pelvis shows colitis.  Clinically concern for C. difficile colitis    Leukocytosis  Colitis: Suspicious for C. difficile colitis  Vomiting and diarrhea  -Past history of C. difficile colitis  -Hospitalization and antibiotics for UTI  -Cannot exclude C. difficile colitis  -Enteric panel and C. difficile PCR ordered  -Will empirically start for C. difficile colitis with oral vancomycin  -IV fluids  -Supportive care  -UA pending  -Trend WBC count  -Monitor electrolytes  -Symptomatic treatment of nausea and vomiting  -Contact precautions     HTN: PTA on carvedilol 12.5 mg twice daily, lisinopril 30 mg daily, Isordil 10 mg 3 times daily, and amlodipine 10 mg daily.    -Resume carvedilol, amlodipine, and Isorbid with hold parameters for low blood pressure  -Resume lisinopril  -Will need to review and reorder meds once reconciled.     Mild cognitive impairment: Has some mild issues with memory per family  -Came to emergency room from TCU  -Fall precautions     History of UTIs and nephrolithiasis:   -Obtain UA/UC, low threshold to treat significant leukocytosis     Chronic anemia:   -Hemoglobin stable    GERD  History of GI bleed:    -takes  omeprazole daily, use pantoprazole per formulary.     MDD  Anxiety:  - Resume sertraline 100 mg daily.    Miscellaneous please review and reorder meds once reconciled    DVT Prophylaxis: Pneumatic Compression Devices  Code Status: DNR / DNI    Disposition: Admitted as inpatient    Primary Care Physician   DINORAH DISLA    Chief Complaint   Sent from TCU for diarrhea and vomiting.    History is obtained from the patient    History of Present Illness   Daya Ignacio is a 89 year old female who presents  with PMH including HTN, NSTEMI, MDD, anxiety, mild cognitive impairment, anemia, chronic back pain from compression fractures, UTI, GI bleed, nephrolithiasis, C. difficile, and falls with previous fractures including right tib/fib, pubic rami, and hip who presents from her TCU where she was rehabbing after recent ORIF surgery discharged on 5/8 presented with vomiting and diarrhea for 2 days     Patient was sleepy and angry at the time of my evaluation.  Denied any abdominal pain.  Complained of generalized malaise and weakness.  Says diarrhea has been slowing down.  Has not vomited after medication given in the emergency room.  Denies any chest pain shortness of breath lightheadedness or dizziness.  More than 10 point review of system was carried out but was limited as patient was not wanting to answer most questions.      workup in the emergency room showed significant leukocytosis of 28,000.  CT chest abdomen pelvis shows colitis.  Discussed with ED provider will empirically start treatment for C. difficile colitis.      Past Medical History    Past Medical History:   Diagnosis Date    Anemia     Anxiety     Arthritis     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 (H28) 04/16/2014    Chronic low back pain     Depression     h/o Clostridium difficile colitis 06/2019    h/o Mumps     Hypertension     Kidney stone     Lumbar compression fracture     Macular degeneration (senile) of retina, unspecified     Mild  cognitive impairment     NSTEMI (non-ST elevated myocardial infarction) (H) 05/28/2017    Recurrent UTI     Seasonal allergic rhinitis     Upper GI bleed 06/2019    Urinary tract infection due to extended-spectrum beta lactamase (ESBL)-producing Klebsiella 05/2019    resistant to Bactrim         Past Surgical History   Past Surgical History:   Procedure Laterality Date    ARTHROPLASTY HIP Right     Cataract removal NOS Bilateral     COMBINED CYSTOSCOPY, INSERT STENT URETER(S) Left 05/06/2020    Procedure: Cystoscopy with left ureteral stent insertion;  Surgeon: Gaurav Munson MD;  Location:  OR    CYSTOSCOPY N/A 08/01/2019    Procedure: Exam under anesthesia, video cystopanendoscopy;  Surgeon: Dennis Carlson MD;  Location:  OR    ESOPHAGOSCOPY, GASTROSCOPY, DUODENOSCOPY (EGD), COMBINED N/A 06/11/2019    Procedure: ESOPHAGOGASTRODUODENOSCOPY (EGD);  Surgeon: Gaurav Degroot MD;  Location:  GI    EXTRACORPOREAL SHOCK WAVE LITHOTRIPSY (ESWL) Left 05/28/2020    Procedure: LEFT EXTRACORPOREAL SHOCK WAVE LITHOTRIPSY;  Surgeon: Gaurav Munson MD;  Location:  OR    LASER HOLMIUM LITHOTRIPSY URETER(S), INSERT STENT, COMBINED Left 10/30/2020    Procedure: Cystoscopy, left ureteral stent exchange, left retrograde pyelogram, interpretation of fluoroscopic images, left ureteroscopy with holmium lithotripsy and stone basketing;  Surgeon: Gaurav Munson MD;  Location:  OR    OPEN REDUCTION INTERNAL FIXATION ANKLE Left 05/03/2024    Procedure: Left Mathias C lateral malleolus fracture open reduction and internal fixation with retrograde fibular nail;  Surgeon: Colin Evans MD;  Location:  OR    OPEN REDUCTION INTERNAL FIXATION HIP Left     OPEN REDUCTION INTERNAL FIXATION RODDING INTRAMEDULLARY TIBIA Left 05/03/2024    Procedure: Left tibia shaft open reduction and internal fixation with intramedullary nail;  Surgeon: Colin Evans MD;  Location:  OR    OPEN REDUCTION  INTERNAL FIXATION TIBIAL PLATEAU Right 08/28/2019    Procedure: Open reduction internal fixation right proximal tibia fracture;  Surgeon: Molina Pardo MD;  Location: [facility] OR    TONSILLECTOMY, ADENOIDECTOMY, COMBINED         Prior to Admission Medications   Prior to Admission Medications   Prescriptions Last Dose Informant Patient Reported? Taking?   Cholecalciferol (VITAMIN D3) 2000 UNITS [facility] Yes No   Sig: Take 2,000 Units by mouth daily    Ferrous Gluconate 324 (37.5 Fe) MG TABS  [facility] Yes No   Sig: Take 1 tablet by mouth daily   Multiple Vitamins-Minerals (PRESERVISION AREDS) [facility] Yes No   Sig: Take 1 capsule by mouth 2 times daily    albuterol (PROVENTIL) (2.5 MG/3ML) 0.083% neb solution   No No   Sig: Take 1 vial (2.5 mg) by nebulization every 4 hours as needed for shortness of breath, wheezing or cough   amLODIPine (NORVASC) 10 MG tablet  Nursing Home Yes No   Sig: Take 10 mg by mouth daily   aspirin (ASA) 325 MG tablet   No No   Sig: Take 1 tablet (325 mg) by mouth daily   carvedilol (COREG) 12.5 MG tablet   Yes No   Sig: Take 12.5 mg by mouth 2 times daily (with meals)   cloNIDine (CATAPRES) 0.1 MG tablet   Yes No   Sig: Take 0.1 mg by mouth at bedtime   cyanocobalamin (VITAMIN B-12) 1000 MCG tablet   Yes No   Sig: Take 1,000 mcg by mouth daily   furosemide (LASIX) 20 MG tablet   No No   Sig: Take 1 tablet (20 mg) by mouth daily   guaiFENesin (MUCINEX) 600 MG 12 hr tablet   No No   Sig: Take 1 tablet (600 mg) by mouth 2 times daily   isosorbide dinitrate (ISORDIL) 10 MG tablet  Nursing Home No No   Sig: Take 1 tablet (10 mg) by mouth 3 times daily   lisinopril (ZESTRIL) 30 MG tablet  Nursing Home Yes No   Sig: Take 30 mg by mouth daily   methocarbamol (ROBAXIN) 500 MG tablet   No No   Sig: Take 1 tablet (500 mg) by mouth 3 times daily   nystatin (MYCOSTATIN) 847461 UNIT/GM external cream  Nursing Home Yes No   Sig: Apply topically 2 times daily as needed (rash)    omeprazole (PRILOSEC) 20 MG DR capsule  Nursing Home Yes No   Sig: Take 20 mg by mouth daily    ondansetron (ZOFRAN) 4 MG tablet  Nursing Home Yes No   Sig: Take 4 mg by mouth every 8 hours as needed for nausea   polyethylene glycol (MIRALAX/GLYCOLAX) packet  Nursing Home No No   Sig: Take 17 g by mouth daily as needed for constipation   potassium chloride ER (K-TAB/KLOR-CON) 10 MEQ CR tablet   Yes No   Sig: Take 10 mEq by mouth daily   senna-docusate (SENOKOT-S/PERICOLACE) 8.6-50 MG tablet   No No   Sig: Take 1 tablet by mouth 2 times daily as needed for constipation   sertraline (ZOLOFT) 100 MG tablet  Nursing Home Yes No   Sig: Take 100 mg by mouth daily      Facility-Administered Medications: None     Allergies   Allergies   Allergen Reactions    Atorvastatin Muscle Pain (Myalgia)    Nitrofurantoin Muscle Pain (Myalgia) and Unknown     Body aches      Penicillins Unknown    Sulfa Antibiotics Other (See Comments)     Tolerated Bactrim May 2019  Entire family has allergy to Sulfa    Sulfasalazine Unknown       Social History   Social History     Tobacco Use    Smoking status: Former     Types: Cigarettes    Smokeless tobacco: Never   Substance Use Topics    Alcohol use: No     Social History     Social History Narrative    Not on file     [facility]    Family History   I have reviewed this patient's family history and updated it with pertinent information if needed.   Family History   Problem Relation Age of Onset    Alzheimer Disease Mother         mild    Cardiovascular Father         heart attack    Neurologic Disorder Brother 83        Parkinson's         Review of Systems   A Comprehensive greater than 10 system review of systems was carried out.  Pertinent positives and negatives are noted above.  Otherwise negative for contributory information.    Physical Exam   Temp: 97.5  F (36.4  C) Temp src: Oral BP: 132/55 Pulse: 64   Resp: 12 SpO2: 93 % O2 Device: Nasal cannula Oxygen Delivery: 2 LPM  Vital  "Signs with Ranges  Temp:  [97.5  F (36.4  C)] 97.5  F (36.4  C)  Pulse:  [55-74] 64  Resp:  [7-20] 12  BP: (111-156)/(52-71) 132/55  SpO2:  [86 %-94 %] 93 %  117 lbs 11.61 oz    GEN:  Alert, oriented x 3, appears comfortable, no overt distress  HEENT:  Normocephalic/atraumatic, no scleral icterus, no nasal discharge, mouth moist.  CV:  Regular rate and rhythm, no murmur or JVD.  S1 + S2 noted, no S3 or S4.  LUNGS:  Clear to auscultation bilaterally without rales/rhonchi/wheezing/retractions.  Symmetric chest rise on inhalation noted.  ABD:  Active bowel sounds, soft, non-tender/non-distended.  No rebound/guarding/rigidity.  EXT:  No edema.  No cyanosis.  No joint synovitis noted.  SKIN:  Dry to touch, no exanthems noted in the visualized areas.  NEURO:  Symmetric muscle strength, sensation to touch grossly intact.  Coordination symmetric on general exam.  No new focal deficits appreciated.    Data   Data reviewed today:  I personally reviewed the EKG tracing showing normal sinus rhythm with no acute ST-T .    No results for input(s): \"PH\", \"PHV\", \"PO2\", \"PO2V\", \"SAT\", \"PCO2\", \"PCO2V\", \"HCO3\", \"HCO3V\" in the last 168 hours.  Recent Labs   Lab 05/26/24  0012 05/24/24  1004   WBC 28.8* 6.0   HGB 10.8* 8.5*   HCT 32.9* 26.1*   * 105*    269     Recent Labs   Lab 05/26/24  0012 05/24/24  1004   * 134*   POTASSIUM 5.1 4.7   CHLORIDE 99 101   CO2 20* 24   ANIONGAP 15 9   * 151*   BUN 41.1* 36.8*   CR 0.80 0.76   GFRESTIMATED 68 73   DAVE 10.4* 9.5     7-Day Micro Results       No results found for the last 168 hours.          Recent Labs   Lab 05/26/24  0012 05/24/24  1004   * 134*   POTASSIUM 5.1 4.7   CHLORIDE 99 101   CO2 20* 24   ANIONGAP 15 9   * 151*   BUN 41.1* 36.8*   CR 0.80 0.76   GFRESTIMATED 68 73   DAVE 10.4* 9.5   PROTTOTAL 7.5  --    ALBUMIN 3.8  --    BILITOTAL 0.8  --    ALKPHOS 289*  --    AST 16  --    ALT 5  --      No results for input(s): \"SED\", \"CRP\" in the last " 168 hours.  GFR Estimate   Date Value Ref Range Status   05/26/2024 68 >60 mL/min/1.73m2 Final   05/24/2024 73 >60 mL/min/1.73m2 Final   05/17/2024 88 >60 mL/min/1.73m2 Final   05/28/2021 >60 >60 mL/min/1.73m2 Final   05/22/2021 76 >60 mL/min/[1.73_m2] Final     Comment:     Non  GFR Calc  Starting 12/18/2018, serum creatinine based estimated GFR (eGFR) will be   calculated using the Chronic Kidney Disease Epidemiology Collaboration   (CKD-EPI) equation.     05/16/2021 79 >60 mL/min/[1.73_m2] Final     Comment:     Non  GFR Calc  Starting 12/18/2018, serum creatinine based estimated GFR (eGFR) will be   calculated using the Chronic Kidney Disease Epidemiology Collaboration   (CKD-EPI) equation.     05/12/2021 79 >60 mL/min/[1.73_m2] Final     Comment:     Non  GFR Calc  Starting 12/18/2018, serum creatinine based estimated GFR (eGFR) will be   calculated using the Chronic Kidney Disease Epidemiology Collaboration   (CKD-EPI) equation.       GFR Estimate If Black   Date Value Ref Range Status   05/28/2021 >60 >60 mL/min/1.73m2 Final   05/22/2021 89 >60 mL/min/[1.73_m2] Final     Comment:      GFR Calc  Starting 12/18/2018, serum creatinine based estimated GFR (eGFR) will be   calculated using the Chronic Kidney Disease Epidemiology Collaboration   (CKD-EPI) equation.     05/16/2021 >90 >60 mL/min/[1.73_m2] Final     Comment:      GFR Calc  Starting 12/18/2018, serum creatinine based estimated GFR (eGFR) will be   calculated using the Chronic Kidney Disease Epidemiology Collaboration   (CKD-EPI) equation.     05/12/2021 >90 >60 mL/min/[1.73_m2] Final     Comment:      GFR Calc  Starting 12/18/2018, serum creatinine based estimated GFR (eGFR) will be   calculated using the Chronic Kidney Disease Epidemiology Collaboration   (CKD-EPI) equation.       Recent Labs   Lab 05/26/24  0012 05/24/24  1004   * 151*     Recent  "Labs   Lab 05/26/24  0012 05/24/24  1004   HGB 10.8* 8.5*     Recent Labs   Lab 05/26/24  0012   AST 16   ALT 5   ALKPHOS 289*   BILITOTAL 0.8     No results for input(s): \"INR\" in the last 168 hours.  Recent Labs   Lab 05/26/24  0012   LACT 1.6     Recent Labs   Lab 05/26/24  0012   LIPASE 26     Recent Labs   Lab 05/26/24  0012 05/24/24  1004   BUN 41.1* 36.8*   CR 0.80 0.76     No results for input(s): \"TSH\" in the last 168 hours.  No results for input(s): \"TROPONIN\", \"TROPI\", \"TROPR\", \"TROPONINIS\" in the last 168 hours.    Invalid input(s): \"TROPT\", \"TROP\", \"TROPONINIES\", \"TNIH\"  No results for input(s): \"COLOR\", \"APPEARANCE\", \"URINEGLC\", \"URINEBILI\", \"URINEKETONE\", \"SG\", \"UBLD\", \"URINEPH\", \"PROTEIN\", \"UROBILINOGEN\", \"NITRITE\", \"LEUKEST\", \"RBCU\", \"WBCU\" in the last 168 hours.  Recent Labs   Lab 05/26/24  0012 05/24/24  1004   WBC 28.8* 6.0       Recent Results (from the past 24 hour(s))   CT Chest/Abdomen/Pelvis w Contrast    Narrative    EXAM: CT CHEST/ABDOMEN/PELVIS W CONTRAST  LOCATION: [facility]  DATE: 5/26/2024    INDICATION: Abd pain, leukocytosis  COMPARISON: None.  TECHNIQUE: CT scan of the chest, abdomen, and pelvis was performed following injection of IV contrast. Multiplanar reformats were obtained. Dose reduction techniques were used.   CONTRAST: 59mL Isovue 370    FINDINGS:   LUNGS AND PLEURA: Trace right pleural effusion. Bibasilar atelectasis. Mild bronchial wall thickening.    MEDIASTINUM/AXILLAE: No thoracic aortic aneurysm. Mitral annulus calcifications. No lymphadenopathy. Mild circumferential proximal esophageal wall thickening.    CORONARY ARTERY CALCIFICATION: Severe.    HEPATOBILIARY: Cholelithiasis. No liver mass.    PANCREAS: No significant mass, duct dilatation, or inflammatory change.    SPLEEN: Normal.    ADRENAL GLANDS: No significant nodules.    KIDNEYS/BLADDER: There is a 6 mm nonobstructing left renal calculus. Otherwise unremarkable.    BOWEL: There is " moderate wall thickening of the colon extending from the mid transverse colon through rectum consistent with colitis. Mild associated pericolonic fat stranding.    LYMPH NODES: Normal.    VASCULATURE: Extensive atherosclerotic calcifications.    PELVIC ORGANS: There is a 2.8 cm left ovarian cyst which does not require follow-up.    MUSCULOSKELETAL: Osteopenia. Chronic bilateral iliac wing fractures. Right hip arthroplasty. ORIF of the left femur. Multilevel vertebral body compression fractures, likely chronic. Chronic bilateral pubic bone fractures.      Impression    IMPRESSION:  1.  Moderate colitis, likely infectious or inflammatory.    2.  Proximal esophageal wall thickening, likely secondary to esophagitis.

## 2024-05-26 NOTE — ED NOTES
"[facility]  ED Nurse Handoff Report    ED Chief complaint: Nausea, Vomiting, & Diarrhea  . ED Diagnosis:   Final diagnoses:   Vomiting and diarrhea   Dehydration   Generalized weakness   Chronic anemia   Colitis   Leukocytosis, unspecified type       Allergies:   Allergies   Allergen Reactions    Atorvastatin Muscle Pain (Myalgia)    Nitrofurantoin Muscle Pain (Myalgia) and Unknown     Body aches      Penicillins Unknown    Sulfa Antibiotics Other (See Comments)     Tolerated Bactrim May 2019  Entire family has allergy to Sulfa    Sulfasalazine Unknown       Code Status: DNR / DNI    Activity level - Baseline/Home:  assist of 1 and assist of 2.  Activity Level - Current:   assist of 1 and assist of 2.   Lift room needed: No.   Bariatric: No   Needed: No   Isolation: Yes.   Infection: Not Applicable  C-Diff Pending.     Respiratory status: Nasal cannula    Vital Signs (within 30 minutes):   Vitals:    05/26/24 0353 05/26/24 0354 05/26/24 0355 05/26/24 0357   BP:       Pulse: 70 72 63 64   Resp: 10 12 (!) 7 12   Temp:       TempSrc:       SpO2: (!) 86% (!) 86% (!) 86% 93%   Weight:       Height:           Cardiac Rhythm:  ,      Pain level:    Patient confused: Yes.   Patient Falls Risk: arm band in place, patient and family education, assistive device/personal items within reach, and toileting schedule implemented.   Elimination Status: Has voided     Patient Report - Initial Complaint: nausea, vomiting, diarrhea.   Focused Assessment: KAM from [facility] for nausea, vomiting and diarrhea.   After dinner around 6-6:30pm, pt had episodes of nausea and vomiting. Zofran tab given. And aroud 10:30 had a large episode of diarrhea. As per EMS, staff reported \"dropped in VS\" BP 95/50, satting 85% in room air. O2 3LPM via nasal cannula, Zofran 4mg IV given enroute.  Upon arrival pt appears weak and lethargic, pale.   Confuse at baseline     Abnormal Results:   Labs Ordered and " Resulted from Time of ED Arrival to Time of ED Departure   COMPREHENSIVE METABOLIC PANEL - Abnormal       Result Value    Sodium 134 (*)     Potassium 5.1      Carbon Dioxide (CO2) 20 (*)     Anion Gap 15      Urea Nitrogen 41.1 (*)     Creatinine 0.80      GFR Estimate 68      Calcium 10.4 (*)     Chloride 99      Glucose 143 (*)     Alkaline Phosphatase 289 (*)     AST 16      ALT 5      Protein Total 7.5      Albumin 3.8      Bilirubin Total 0.8     CBC WITH PLATELETS AND DIFFERENTIAL - Abnormal    WBC Count 28.8 (*)     RBC Count 3.25 (*)     Hemoglobin 10.8 (*)     Hematocrit 32.9 (*)      (*)     MCH 33.2 (*)     MCHC 32.8      RDW 17.8 (*)     Platelet Count 421      % Neutrophils 89      % Lymphocytes 4      % Monocytes 6      % Eosinophils 0      % Basophils 0      % Immature Granulocytes 1      NRBCs per 100 WBC 0      Absolute Neutrophils 25.6 (*)     Absolute Lymphocytes 1.1      Absolute Monocytes 1.6 (*)     Absolute Eosinophils 0.1      Absolute Basophils 0.1      Absolute Immature Granulocytes 0.4      Absolute NRBCs 0.1     LIPASE - Normal    Lipase 26     LACTIC ACID WHOLE BLOOD - Normal    Lactic Acid 1.6     ROUTINE UA WITH MICROSCOPIC   ENTERIC BACTERIA AND VIRUS PANEL BY PCR   C. DIFFICILE TOXIN B PCR WITH REFLEX TO C. DIFFICILE ANTIGEN AND TOXINS A/B EIA        CT Chest/Abdomen/Pelvis w Contrast   Final Result   IMPRESSION:   1.  Moderate colitis, likely infectious or inflammatory.      2.  Proximal esophageal wall thickening, likely secondary to esophagitis.          Treatments provided: see MAR; CT  Family Comments: N/A  OBS brochure/video discussed/provided to patient:  N/A  ED Medications:   Medications   sodium chloride 0.9% BOLUS 1,000 mL (0 mLs Intravenous Stopped 5/26/24 0253)   ondansetron (ZOFRAN) injection 4 mg (4 mg Intravenous $Given 5/26/24 0020)   sodium chloride 0.9% BOLUS 1,000 mL (0 mLs Intravenous Stopped 5/26/24 0253)   iopamidol (ISOVUE-370) solution 500 mL (59 mLs  Intravenous $Given 5/26/24 0324)   for CT scan flush use (55 mLs Intravenous $Given 5/26/24 0324)       Drips infusing:  No  For the majority of the shift this patient was Green.   Interventions performed were N/A.    Sepsis treatment initiated: No    Cares/treatment/interventions/medications to be completed following ED care: se inpatient note    ED Nurse Name: Rachel Heaton RN  4:46 AM     RECEIVING UNIT ED HANDOFF REVIEW    Above ED Nurse Handoff Report was reviewed: Yes  Reviewed by: LIZ LÓPEZ RN on May 26, 2024 at 5:20 AM   I Maricruz called the ED to inform them the note was read: Yes

## 2024-05-26 NOTE — ED TRIAGE NOTES
"KAM from [facility] for nausea, vomiting and diarrhea.   After dinner around 6-6:30pm, pt had episodes of nausea and vomiting. Zofran tab given. And aroud 10:30 had a large episode of diarrhea. As per EMS, staff reported \"dropped in VS\" BP 95/50, satting 85% in room air. O2 3LPM via nasal cannula, Zofran 4mg IV given enroute.  Upon arrival pt appears weak and lethargic, pale.   Confuse at baseline   mg/dL          "

## 2024-05-26 NOTE — ED NOTES
"Pt incontinent of stool x2. Pt cleaned up and bedding/brief changed. Pt adamantly refusing straight cath for urine sample. ERT and RN explained to her the importance of a clean sample, but pt still says she \"has the right to refuse and she will not allow is to stick anything inside of her.\" Pt agreeable to placing a pure wick for sample. Primary RN and MD notified.    "

## 2024-05-26 NOTE — PROGRESS NOTES
Brief Hospitalist Update Note    Patient admitted and seen by hospitalist this morning. Will not bill additionally for this date.     Stool panel negative for infectious diarrhea. C diff PCR positive, Ag positive, toxin negative. However, patient with history of C diff colitis and presents with leukocytosis and concerning colitis on imaging. Consulting [facility] for ongoing recommendations. Continue oral vancomycin at this time.    Patient seen this morning. Reports abdominal discomfort and cramping. Frail woman, admitted from TCU after recent hospitalization. Reviewed admission med rec. Will continue to follow closely.     Arina Schafer MD

## 2024-05-27 LAB
ANION GAP SERPL CALCULATED.3IONS-SCNC: 11 MMOL/L (ref 7–15)
BACTERIA UR CULT: ABNORMAL
BUN SERPL-MCNC: 30.8 MG/DL (ref 8–23)
CALCIUM SERPL-MCNC: 8.8 MG/DL (ref 8.2–9.6)
CHLORIDE SERPL-SCNC: 107 MMOL/L (ref 98–107)
CREAT SERPL-MCNC: 0.56 MG/DL (ref 0.51–0.95)
DEPRECATED HCO3 PLAS-SCNC: 18 MMOL/L (ref 22–29)
EGFRCR SERPLBLD CKD-EPI 2021: 85 ML/MIN/1.73M2
ERYTHROCYTE [DISTWIDTH] IN BLOOD BY AUTOMATED COUNT: 18.3 % (ref 10–15)
GLUCOSE SERPL-MCNC: 88 MG/DL (ref 70–99)
HCT VFR BLD AUTO: 22.9 % (ref 35–47)
HGB BLD-MCNC: 7.2 G/DL (ref 11.7–15.7)
MAGNESIUM SERPL-MCNC: 1.8 MG/DL (ref 1.7–2.3)
MCH RBC QN AUTO: 32.7 PG (ref 26.5–33)
MCHC RBC AUTO-ENTMCNC: 31.4 G/DL (ref 31.5–36.5)
MCV RBC AUTO: 104 FL (ref 78–100)
PLATELET # BLD AUTO: 205 10E3/UL (ref 150–450)
POTASSIUM SERPL-SCNC: 4.1 MMOL/L (ref 3.4–5.3)
RBC # BLD AUTO: 2.2 10E6/UL (ref 3.8–5.2)
SODIUM SERPL-SCNC: 136 MMOL/L (ref 135–145)
WBC # BLD AUTO: 8.9 10E3/UL (ref 4–11)

## 2024-05-27 PROCEDURE — 120N000001 HC R&B MED SURG/OB

## 2024-05-27 PROCEDURE — 250N000013 HC RX MED GY IP 250 OP 250 PS 637: Performed by: INTERNAL MEDICINE

## 2024-05-27 PROCEDURE — 36415 COLL VENOUS BLD VENIPUNCTURE: CPT | Performed by: INTERNAL MEDICINE

## 2024-05-27 PROCEDURE — 85027 COMPLETE CBC AUTOMATED: CPT | Performed by: INTERNAL MEDICINE

## 2024-05-27 PROCEDURE — 99233 SBSQ HOSP IP/OBS HIGH 50: CPT | Performed by: INTERNAL MEDICINE

## 2024-05-27 PROCEDURE — 83735 ASSAY OF MAGNESIUM: CPT | Performed by: INTERNAL MEDICINE

## 2024-05-27 PROCEDURE — 258N000003 HC RX IP 258 OP 636: Performed by: INTERNAL MEDICINE

## 2024-05-27 PROCEDURE — 250N000011 HC RX IP 250 OP 636: Performed by: INTERNAL MEDICINE

## 2024-05-27 PROCEDURE — C9113 INJ PANTOPRAZOLE SODIUM, VIA: HCPCS | Performed by: INTERNAL MEDICINE

## 2024-05-27 PROCEDURE — 80048 BASIC METABOLIC PNL TOTAL CA: CPT | Performed by: INTERNAL MEDICINE

## 2024-05-27 RX ORDER — GUAIFENESIN 600 MG/1
600 TABLET, EXTENDED RELEASE ORAL 2 TIMES DAILY
Status: DISCONTINUED | OUTPATIENT
Start: 2024-05-27 | End: 2024-05-31 | Stop reason: HOSPADM

## 2024-05-27 RX ORDER — POTASSIUM CHLORIDE 750 MG/1
10 TABLET, EXTENDED RELEASE ORAL DAILY
Status: DISCONTINUED | OUTPATIENT
Start: 2024-05-28 | End: 2024-05-31 | Stop reason: HOSPADM

## 2024-05-27 RX ADMIN — ACETAMINOPHEN 975 MG: 325 TABLET, FILM COATED ORAL at 14:56

## 2024-05-27 RX ADMIN — SODIUM CHLORIDE: 9 INJECTION, SOLUTION INTRAVENOUS at 16:30

## 2024-05-27 RX ADMIN — ISOSORBIDE DINITRATE 10 MG: 10 TABLET ORAL at 08:36

## 2024-05-27 RX ADMIN — VANCOMYCIN HYDROCHLORIDE 125 MG: 125 CAPSULE ORAL at 08:36

## 2024-05-27 RX ADMIN — ACETAMINOPHEN 975 MG: 325 TABLET, FILM COATED ORAL at 08:35

## 2024-05-27 RX ADMIN — ACETAMINOPHEN 975 MG: 325 TABLET, FILM COATED ORAL at 19:55

## 2024-05-27 RX ADMIN — VANCOMYCIN HYDROCHLORIDE 125 MG: 125 CAPSULE ORAL at 16:30

## 2024-05-27 RX ADMIN — SODIUM CHLORIDE: 9 INJECTION, SOLUTION INTRAVENOUS at 06:29

## 2024-05-27 RX ADMIN — AMLODIPINE BESYLATE 10 MG: 10 TABLET ORAL at 08:36

## 2024-05-27 RX ADMIN — LISINOPRIL 30 MG: 20 TABLET ORAL at 08:36

## 2024-05-27 RX ADMIN — GUAIFENESIN 600 MG: 600 TABLET, EXTENDED RELEASE ORAL at 12:46

## 2024-05-27 RX ADMIN — VANCOMYCIN HYDROCHLORIDE 125 MG: 125 CAPSULE ORAL at 19:55

## 2024-05-27 RX ADMIN — METHOCARBAMOL 500 MG: 500 TABLET ORAL at 04:49

## 2024-05-27 RX ADMIN — ISOSORBIDE DINITRATE 10 MG: 10 TABLET ORAL at 12:46

## 2024-05-27 RX ADMIN — METHOCARBAMOL 500 MG: 500 TABLET ORAL at 12:46

## 2024-05-27 RX ADMIN — CLONIDINE HYDROCHLORIDE 0.1 MG: 0.1 TABLET ORAL at 21:38

## 2024-05-27 RX ADMIN — ISOSORBIDE DINITRATE 10 MG: 10 TABLET ORAL at 18:31

## 2024-05-27 RX ADMIN — GUAIFENESIN 600 MG: 600 TABLET, EXTENDED RELEASE ORAL at 19:55

## 2024-05-27 RX ADMIN — SERTRALINE HYDROCHLORIDE 100 MG: 100 TABLET ORAL at 08:36

## 2024-05-27 RX ADMIN — PANTOPRAZOLE SODIUM 40 MG: 40 INJECTION, POWDER, FOR SOLUTION INTRAVENOUS at 18:31

## 2024-05-27 RX ADMIN — VANCOMYCIN HYDROCHLORIDE 125 MG: 125 CAPSULE ORAL at 12:46

## 2024-05-27 RX ADMIN — PANTOPRAZOLE SODIUM 40 MG: 40 TABLET, DELAYED RELEASE ORAL at 06:28

## 2024-05-27 RX ADMIN — ASPIRIN 325 MG: 325 TABLET ORAL at 08:36

## 2024-05-27 RX ADMIN — NYSTATIN: 100000 CREAM TOPICAL at 09:01

## 2024-05-27 ASSESSMENT — ACTIVITIES OF DAILY LIVING (ADL)
ADLS_ACUITY_SCORE: 54
ADLS_ACUITY_SCORE: 54
ADLS_ACUITY_SCORE: 56
ADLS_ACUITY_SCORE: 54
ADLS_ACUITY_SCORE: 56
ADLS_ACUITY_SCORE: 54
ADLS_ACUITY_SCORE: 56

## 2024-05-27 NOTE — CONSULTS
GASTROENTEROLOGY CONSULTATION      Daya Ignacio  [address]  [place] [address]  89 year old female     Admission Date/Time: 5/25/2024  Primary Care Provider: Johann Hall     We were asked to see the patient in consultation by Dr. Carpenter for evaluation of colitis.    ASSESSMENT:      #1 C. Diff colitis  #1 Anemia, subacute to chronic    No concerns on CT scan. Esophagitis and expected colitis noted. Confirmed with room nurse- no blood in stools. Still liquid stools but improving volume and frequency. WBC count normalized on vancomycin. C. Diff PCR +ve, GDH+ve. (toxin EIA -ve but given clinical context, high pre-test probability and toxin EIA has known low sensitivity, so is not significant and does not refute the diagnosis of C. Diff).     She will need 14d Rx, then suggest 125 mg twice daily; more recent guidelines suggest microbiota restorative therapy now even with 1st recurrence specially with high risk individuals (in her case age). Suggest [facility] outpatient appt post discharge to review if PO FMT therapy (vowst) can be an option for her- if so, to stay on 125 mg vancomycin twice daily until can get vowst. If this is not an option, then would do traditional prolonged taper.    Etiology of anemia unclear. No overt bleeding. High dose ASA a risk for PUD but no overt bleeding.   - switch to IV protonix, increase to q12.   - monitor Hb.   - transfuse if < 7.   - Review if 325 mg aspirin needed, if no clear indication, would consider reducing to 81 mg or stopping.    GI team will follow.        Matthew Lozoya MD   [facility]  [phone]      ________________________________________________________________________        HPI:  Daya Ignacio is a 89 year old female with PMH as below, recent left leg fracture s/p ORIF, discharged to TCU on 5/8, now presenting with vomiting and diarrhea for a few days. Pt denies any abd pain or heartburn. Denies any bleeding or black  stools. She has been on 325 mg aspirin post surgery. Initial testing with C. Diff PCR +ve, and improving with po 125 mg vancomycin. Hb trended down. CT chest/abd/pelvis reviewed. She is unable to clarify how many times she had C. Diff, per EMR documentation, appears to be a recurrent issue.     ROS: A comprehensive ten point review of systems was negative aside from those in mentioned in the HPI.      PAST MEDICAL HISTORY:  Past Medical History:   Diagnosis Date    Anemia     Anxiety     Arthritis     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 (H28) 04/16/2014    Chronic low back pain     Depression     h/o Clostridium difficile colitis 06/2019    h/o Mumps     Hypertension     Kidney stone     Lumbar compression fracture     Macular degeneration (senile) of retina, unspecified     Mild cognitive impairment     NSTEMI (non-ST elevated myocardial infarction) (H) 05/28/2017    Recurrent UTI     Seasonal allergic rhinitis     Upper GI bleed 06/2019    Urinary tract infection due to extended-spectrum beta lactamase (ESBL)-producing Klebsiella 05/2019    resistant to Bactrim     SOCIAL HISTORY:  Social History     Tobacco Use    Smoking status: Former     Types: Cigarettes    Smokeless tobacco: Never   Substance Use Topics    Alcohol use: No    Drug use: No     FAMILY HISTORY:  Family History   Problem Relation Age of Onset    Alzheimer Disease Mother         mild    Cardiovascular Father         heart attack    Neurologic Disorder Brother 83        Parkinson's     ALLERGIES:   Allergies   Allergen Reactions    Atorvastatin Muscle Pain (Myalgia)    Nitrofurantoin Muscle Pain (Myalgia) and Unknown     Body aches      Penicillins Unknown    Sulfa Antibiotics Other (See Comments)     Tolerated Bactrim May 2019  Entire family has allergy to Sulfa    Sulfasalazine Unknown     MEDICATIONS:   Prior to Admission medications    Medication Sig Start Date End Date Taking? Authorizing Provider   acetaminophen (TYLENOL) 500 MG tablet  Take 1,000 mg by mouth 3 times daily   Yes Unknown, Entered By History   albuterol (PROVENTIL) (2.5 MG/3ML) 0.083% neb solution Take 1 vial (2.5 mg) by nebulization every 4 hours as needed for shortness of breath, wheezing or cough 5/9/24  Yes Olivia Melara PA-C   amLODIPine (NORVASC) 10 MG tablet Take 10 mg by mouth daily   Yes Unknown, Entered By History   aspirin (ASA) 325 MG tablet Take 1 tablet (325 mg) by mouth daily 5/5/24  Yes Juliann Bergeron PA-C   carvedilol (COREG) 12.5 MG tablet Take 12.5 mg by mouth 2 times daily (with meals)   Yes Unknown, Entered By History   Cholecalciferol (VITAMIN D3) 2000 UNITS CAPS Take 2,000 Units by mouth daily    Yes Unknown, Entered By History   cloNIDine (CATAPRES) 0.1 MG tablet Take 0.1 mg by mouth at bedtime   Yes Unknown, Entered By History   cyanocobalamin (VITAMIN B-12) 1000 MCG tablet Take 1,000 mcg by mouth daily   Yes Unknown, Entered By History   Ferrous Gluconate 324 (37.5 Fe) MG TABS Take 1 tablet by mouth daily   Yes Unknown, Entered By History   furosemide (LASIX) 20 MG tablet Take 1 tablet (20 mg) by mouth daily 5/13/24  Yes Olivia Melara PA-C   guaiFENesin (MUCINEX) 600 MG 12 hr tablet Take 1 tablet (600 mg) by mouth 2 times daily 5/7/24  Yes Jailene Dumont DO   HYDROmorphone (DILAUDID) 2 MG tablet Take 2 mg by mouth every 6 hours as needed for pain   Yes Unknown, Entered By History   HYDROmorphone (DILAUDID) 2 MG tablet Take 2 mg by mouth daily   Yes Unknown, Entered By History   isosorbide dinitrate (ISORDIL) 10 MG tablet Take 1 tablet (10 mg) by mouth 3 times daily 2/19/19  Yes Marcy Soares PA-C   lisinopril (ZESTRIL) 30 MG tablet Take 30 mg by mouth daily   Yes Unknown, Entered By History   methocarbamol (ROBAXIN) 500 MG tablet Take 500 mg by mouth every 6 hours as needed for muscle spasms   Yes Unknown, Entered By History   Multiple Vitamins-Minerals (PRESERVISION AREDS) CAPS Take 1 capsule by mouth 2 times daily    Yes  "Unknown, Entered By History   nystatin (MYCOSTATIN) 035375 UNIT/GM external cream Apply topically 2 times daily as needed (rash)   Yes Unknown, Entered By History   omeprazole (PRILOSEC) 20 MG DR capsule Take 20 mg by mouth daily    Yes Reported, Patient   ondansetron (ZOFRAN) 4 MG tablet Take 4 mg by mouth every 8 hours as needed for nausea   Yes Unknown, Entered By History   polyethylene glycol (MIRALAX/GLYCOLAX) packet Take 17 g by mouth daily as needed for constipation 8/29/19  Yes Gisela Evans PA-C   potassium chloride ER (K-TAB/KLOR-CON) 10 MEQ CR tablet Take 10 mEq by mouth daily   Yes Reported, Patient   senna-docusate (SENOKOT-S/PERICOLACE) 8.6-50 MG tablet Take 1 tablet by mouth 2 times daily as needed for constipation 5/5/24  Yes Juliann Bergeron PA-C   sertraline (ZOLOFT) 100 MG tablet Take 100 mg by mouth daily   Yes Unknown, Entered By History     PHYSICAL EXAM:   BP (!) 140/58 (BP Location: Right arm, Patient Position: Semi-Kumar's)   Pulse 81   Temp 98.2  F (36.8  C) (Temporal)   Resp 18   Ht 1.626 m (5' 4\")   Wt 53.7 kg (118 lb 6.2 oz)   SpO2 97%   BMI 20.32 kg/m     GEN: No distress, Alert, comfortable.  MARLENY: No pallor, No icterus, oral mucosa moist.    NECK: No thyromegaly. No mass.    HEART: RRR, S1 S2 normal.   LUNGS: CTA BL  ABD: soft, non-tender, non-distended, no peritoneal signs.  NEURO: No gross motor deficits.  PSYCH: A O X 3.  EXTREMITIES: No pedal edema, left knee surgical scars.      ADDITIONAL DATA:   I reviewed the patient's new clinical lab test results.   Recent Labs   Lab Test 05/27/24  0658 05/26/24  1230 05/26/24  0012 05/03/24  0716 05/02/24  0717 05/07/20  0454 05/06/20  0928 08/29/19  0716 08/28/19  0850   WBC 8.9 21.6* 28.8*   < > 8.6   < > 3.5*   < > 29.2*   HGB 7.2* 9.4* 10.8*   < > 9.1*   < > 13.5   < > 9.0*   * 103* 101*   < > 103*   < > 99   < > 103*    296 421   < > 194   < > 168   < > 181   INR  --   --   --   --  1.12  --  1.13  --  " 1.18*    < > = values in this interval not displayed.     Recent Labs   Lab Test 05/27/24  0658 05/26/24  1230 05/26/24  0012    134* 134*   POTASSIUM 4.1 4.8 5.1   CHLORIDE 107 104 99   CO2 18* 19* 20*   BUN 30.8* 35.0* 41.1*   CR 0.56 0.65 0.80   ANIONGAP 11 11 15   DAVE 8.8 9.1 10.4*   GLC 88 121* 143*     Recent Labs   Lab Test 05/26/24  0457 05/26/24  0012 05/03/24  1636 02/06/23  1748 01/24/23  1810 01/24/23  0644 07/14/22  1753 06/22/22  1000 05/06/20  1035 05/06/20  0928 05/26/19  1703 05/26/19  1531   ALBUMIN  --  3.8  --   --   --  4.0  --  3.4*   < > 3.7   < > 3.5   BILITOTAL  --  0.8  --   --   --  1.1  --  0.9   < > 1.3   < > 0.6   ALT  --  5  --   --   --  8*  --  <9   < > 12   < > 17   AST  --  16  --   --   --  14  --  9   < > 15   < > 24   PROTEIN Negative  --  30* Negative   < >  --    < >  --    < >  --    < >  --    LIPASE  --  26  --   --   --   --   --   --   --  42*  --  41*    < > = values in this interval not displayed.        I reviewed the patient's new imaging results.    Total time: 50 minutes.

## 2024-05-27 NOTE — PLAN OF CARE
"Goal Outcome Evaluation:      Plan of Care Reviewed With: patient    Overall Patient Progress: no changeOverall Patient Progress: no change    Outcome Evaluation: Afebrile. Incontinent loose BM's. Complains of abdominal discomfort.    Pt is alert, confused. IV fluids. Red buttock/ kayce area. Continues on po vanco. Voiding with purewick. PRN Robaxin for pain. No nausea or vomiting. GI consult pending.       Problem: Adult Inpatient Plan of Care  Goal: Plan of Care Review  Description: The Plan of Care Review/Shift note should be completed every shift.  The Outcome Evaluation is a brief statement about your assessment that the patient is improving, declining, or no change.  This information will be displayed automatically on your shift  note.  Outcome: Not Progressing  Flowsheets (Taken 5/27/2024 0410)  Outcome Evaluation: Afebrile. Incontinent loose BM's. Complains of abdominal discomfort.  Plan of Care Reviewed With: patient  Overall Patient Progress: no change  Goal: Patient-Specific Goal (Individualized)  Description: You can add care plan individualizations to a care plan. Examples of Individualization might be:  \"Parent requests to be called daily at 9am for status\", \"I have a hard time hearing out of my right ear\", or \"Do not touch me to wake me up as it startles  me\".  Outcome: Not Progressing  Goal: Absence of Hospital-Acquired Illness or Injury  Outcome: Not Progressing  Intervention: Identify and Manage Fall Risk  Recent Flowsheet Documentation  Taken 5/26/2024 2000 by Bree Zheng, RN  Safety Promotion/Fall Prevention:   activity supervised   assistive device/personal items within reach   safety round/check completed  Goal: Optimal Comfort and Wellbeing  Outcome: Not Progressing  Intervention: Monitor Pain and Promote Comfort  Recent Flowsheet Documentation  Taken 5/26/2024 2039 by Bree Zheng, RN  Pain Management Interventions: heat applied  Goal: Readiness for Transition of " Care  Outcome: Not Progressing     Problem: Gastroenteritis  Goal: Effective Diarrhea Management  Outcome: Not Progressing  Goal: Fluid and Electrolyte Balance  Outcome: Not Progressing  Goal: Nausea and Vomiting Relief  Outcome: Not Progressing     Problem: Comorbidity Management  Goal: Maintenance of Behavioral Health Symptom Control  Outcome: Not Progressing  Intervention: Maintain Behavioral Health Symptom Control  Recent Flowsheet Documentation  Taken 5/26/2024 2000 by Bree Zheng RN  Medication Review/Management:   medications reviewed   high-risk medications identified  Goal: Blood Pressure in Desired Range  Outcome: Not Progressing  Intervention: Maintain Blood Pressure Management  Recent Flowsheet Documentation  Taken 5/26/2024 2000 by Bree Zheng RN  Medication Review/Management:   medications reviewed   high-risk medications identified

## 2024-05-27 NOTE — PROGRESS NOTES
[facility]    Hospitalist Progress Note      Assessment & Plan   Daya Ignacio is a 89 year old woman who was admitted on 5/25/2024. PMH significant for HTN, NSTEMI, MDD, anxiety, mild cognitive impairment, anemia, chronic back pain from compression fractures, UTI, GI bleed, nephrolithiasis, C. difficile, and falls with previous fractures including left tib/fib, pubic rami, and hip. [facility] after 5/3/24 ORIF for acute comminuted distal left tib/fib fracture. She presented with 2 days of vomiting and diarrhea. Leukocytosis noted in the ER with WBC 28k. CT chest abdomen pelvis revealed colitis. There was concern for recurrent C diff colitis and patient was admitted for further evaluation and treatment.     Leukocytosis  Colitis: Suspicious for C. difficile colitis  History of C diff colitis  Vomiting and diarrhea  -Past history of C. difficile colitis  - Recent hospitalization and antibiotics for UTI (see below for ongoing management of urine culture)  - Enteric panel negative.  - C diff with positive PCR, toxin negative, though given high clinical suspicion, likely C diff recurrence.   - Appreciate GI consultation and recommendations. Continue 14 day treatment followed by vancomycin 125 mg BID x 4 weeks (or until [facility] follow up). Outpatient [facility] follow up to determine if PO fecal microbiota transplant (Vowst) an option for her.   - WBC improved drastically on PO vanc from 28.8 -> 21.6 -> 8.9.  - Stopping IV fluids 5/27 and continue to encourage PO intake.   - Monitor electrolytes  - Symptomatic treatment of nausea and vomiting  - Contact precautions     Acute on chronic anemia  GERD  History of GI bleed  Hgb since admission has decreased from 10.8 -> 9.4 -> 7.2 in setting of increased IV fluids.  No overt evidence of bleeding.   Patient currently remains on aspirin 325 mg daily for DVT prophylaxis for 6 weeks following ORIF through 6/14/24 if  possible.  GI changed PPI to IV BID. Monitor Hgb. If < 7, would transfuse. And consider alternative options for post-op DVT prophylaxis.     Subacute respiratory failure with hypoxia  Subacute cough  Patient has had ongoing cough and minimal O2 requirement since recent admission.  5/26/24 CT chest with contrast with mild bronchial wall thickening. No pulmonary infiltrate.  Will continue patient's Mucinex at least initially.   Wean O2 as able.   Stopping IV fluids and resuming PTA Lasix / K.     Chronic back pain  History of vertebral compression fractures T11-L3  Status post 5/3 distal left tib/fib ORIF   Patient is status post 5/3/24 ORIF to acute comminuted distal left tib/fib fracture.  Patient is to be wearing boot when out of bed, NWB left leg until at least next ortho follow up 6/25. Boot should be brought from facility if possible.   Aspirin 325 mg daily for DVT prophylaxis for 6 weeks following ORIF through 6/14/24 if possible.  Continue vitamin D supplementation.  Continue scheduled Tylenol.  PRN methocarbamol 500 mg TID.   5/23, rehab changed from PRN oxycodone to PRN hydromorphone 2 mg. They also noted that patient did not have insight to request PRN medications and scheduled 1 dose hydromorphone in the morning. However, here, patient has frequently declined pain and will not schedule. Pain actually seems reasonably controlled without opioids at this time. Would monitor and continue to discuss pain with nursing and assess patient's complaints and ability to participate with therapy. If needed, would add PRN hydromorphone, though will not order at this time.     Abnormal urine culture  History of UTIs and nephrolithiasis  Patient with > 100 GNR in urine culture. However, patient has not had a negative urine culture going back to 5/2021. Patient without urinary symptoms.    5/26/24 CT abdomen notes 6 mm nonobstructing left renal stone; otherwise unremarkable. No stranding or signs suggestive of  inflammation.   Suspect contamination. If had urinary symptoms, could consider treatment, but would also consider urology evaluation to ensure no ongoing nidus of infection.   Will not treat at this time.     HTN  PTA on carvedilol 12.5 mg twice daily, lisinopril 30 mg daily, Isordil 10 mg 3 times daily, amlodipine 10 mg daily, and Lasix 20 mg daily. Resuming PTA regimen.   Monitor BP.     Mild cognitive impairment  Has some mild issues with memory per family  -Came to emergency room from TCU  -Fall precautions     MDD  Anxiety  - Continue PTA sertraline 100 mg daily.    DVT Prophylaxis: Pneumatic Compression Devices and post op aspirin 325 mg daily (through 6/14/24 if able)  Code Status: No CPR- Do NOT Intubate  Expected discharge: Anticipate that she will return to TCU once medically ready. SW consulted. PT to consulted while here.     Arina Schafer MD FACP  Hospitalist Service  [facility]      Interval History   Continued loose stools. WBC improved with PO vancomycin. Appreciate GI consultation. Hgb decreased, but no evidence of GI bleeding. In setting of IV fluids, which are now being stopped. Ongoing cough. Resuming PTA Lasix. Patient denies pain. Left lower extremity boot left at facility. Remains NWB. Will consult PT, noting remains NWB.     -Data reviewed today: I reviewed all new labs and imaging results over the last 24 hours.       Physical Exam   Temp: 98.2  F (36.8  C) Temp src: Temporal BP: (!) 140/58 Pulse: 81   Resp: 18 SpO2: 97 % O2 Device: Nasal cannula Oxygen Delivery: 1 LPM  Vitals:    05/26/24 0303 05/26/24 0634   Weight: 53.4 kg (117 lb 11.6 oz) 53.7 kg (118 lb 6.2 oz)     Vital Signs with Ranges  Temp:  [98.1  F (36.7  C)-99.1  F (37.3  C)] 98.2  F (36.8  C)  Pulse:  [78-85] 81  Resp:  [16-18] 18  BP: (100-140)/(40-58) 140/58  SpO2:  [94 %-99 %] 97 %  I/O last 3 completed shifts:  In: 1394 [P.O.:240; I.V.:1154]  Out: 800 [Urine:800]    Constitutional: Frail older woman  resting in bed. Alert. Only oriented to person. No acute to distress. Non-toxic.   HEENT: NCAT. EOMI. Moist oral mucosa.  Respiratory: Clear to auscultation bilaterally. No crackles or wheezes. Patient does have cough, which she states is not new.   Cardiovascular: Regular rate and rhythm. No murmur.  GI: Soft, nontender, nondistended. Hyperactive bowel sounds.   Musculoskeletal: No gross deformities. Patient with surprisingly good strength and able to pull herself up in bed.   Neurologic: Alert. Oriented only to person. No focal neurologic deficits. Did not assess gait.    Medications   Current Facility-Administered Medications   Medication Dose Route Frequency Provider Last Rate Last Admin    sodium chloride 0.9 % infusion   Intravenous Continuous Fany Dos Santos  mL/hr at 05/27/24 0836 Rate Verify at 05/27/24 0836     Current Facility-Administered Medications   Medication Dose Route Frequency Provider Last Rate Last Admin    acetaminophen (TYLENOL) tablet 975 mg  975 mg Oral TID Arina Carpenter MD   975 mg at 05/27/24 1456    amLODIPine (NORVASC) tablet 10 mg  10 mg Oral Daily Fany Dos Santos MD   10 mg at 05/27/24 0836    aspirin (ASA) EC tablet 325 mg  325 mg Oral Daily Fany Dos Santos MD   325 mg at 05/27/24 0836    [Held by provider] carvedilol (COREG) tablet 12.5 mg  12.5 mg Oral BID w/meals Arina Carpenter MD        cloNIDine (CATAPRES) tablet 0.1 mg  0.1 mg Oral At Bedtime Arina Carpenter MD        [Held by provider] furosemide (LASIX) tablet 20 mg  20 mg Oral Daily Arina Carpenter MD        guaiFENesin (MUCINEX) 12 hr tablet 600 mg  600 mg Oral BID Arina Carpenter MD   600 mg at 05/27/24 1246    isosorbide dinitrate (ISORDIL) tablet 10 mg  10 mg Oral TID Fany Dos Santos MD   10 mg at 05/27/24 1246    lisinopril (ZESTRIL) tablet 30 mg  30 mg Oral Daily Fany Dos Santos MD   30 mg at 05/27/24 0836    pantoprazole (PROTONIX) EC tablet 40 mg  40  mg Oral QAM AC Fany Dos Santos MD   40 mg at 05/27/24 0628    sertraline (ZOLOFT) tablet 100 mg  100 mg Oral Daily Fany Dos Santos MD   100 mg at 05/27/24 0836    sodium chloride (PF) 0.9% PF flush 3 mL  3 mL Intracatheter Q8H Fany Dos Santos MD        vancomycin (VANCOCIN) capsule 125 mg  125 mg Oral 4x Daily Fany Dos Santos MD   125 mg at 05/27/24 1246       Data   Recent Labs   Lab 05/27/24  0658 05/26/24  1230 05/26/24  0012   WBC 8.9 21.6* 28.8*   HGB 7.2* 9.4* 10.8*   * 103* 101*    296 421    134* 134*   POTASSIUM 4.1 4.8 5.1   CHLORIDE 107 104 99   CO2 18* 19* 20*   BUN 30.8* 35.0* 41.1*   CR 0.56 0.65 0.80   ANIONGAP 11 11 15   DAVE 8.8 9.1 10.4*   GLC 88 121* 143*   ALBUMIN  --   --  3.8   PROTTOTAL  --   --  7.5   BILITOTAL  --   --  0.8   ALKPHOS  --   --  289*   ALT  --   --  5   AST  --   --  16   LIPASE  --   --  26       No results found for this or any previous visit (from the past 24 hour(s)).

## 2024-05-28 LAB
ABO/RH(D): NORMAL
ALBUMIN SERPL BCG-MCNC: 3.2 G/DL (ref 3.5–5.2)
ALP SERPL-CCNC: 179 U/L (ref 40–150)
ALT SERPL W P-5'-P-CCNC: 5 U/L (ref 0–50)
ANION GAP SERPL CALCULATED.3IONS-SCNC: 10 MMOL/L (ref 7–15)
ANTIBODY SCREEN: NEGATIVE
AST SERPL W P-5'-P-CCNC: 10 U/L (ref 0–45)
BASOPHILS # BLD AUTO: 0 10E3/UL (ref 0–0.2)
BASOPHILS NFR BLD AUTO: 0 %
BILIRUB DIRECT SERPL-MCNC: <0.2 MG/DL (ref 0–0.3)
BILIRUB SERPL-MCNC: 0.6 MG/DL
BLD PROD TYP BPU: NORMAL
BLOOD COMPONENT TYPE: NORMAL
BUN SERPL-MCNC: 13.5 MG/DL (ref 8–23)
CALCIUM SERPL-MCNC: 9.3 MG/DL (ref 8.2–9.6)
CHLORIDE SERPL-SCNC: 106 MMOL/L (ref 98–107)
CODING SYSTEM: NORMAL
CREAT SERPL-MCNC: 0.44 MG/DL (ref 0.51–0.95)
CROSSMATCH: NORMAL
DEPRECATED HCO3 PLAS-SCNC: 20 MMOL/L (ref 22–29)
EGFRCR SERPLBLD CKD-EPI 2021: 90 ML/MIN/1.73M2
EOSINOPHIL # BLD AUTO: 0.2 10E3/UL (ref 0–0.7)
EOSINOPHIL NFR BLD AUTO: 3 %
ERYTHROCYTE [DISTWIDTH] IN BLOOD BY AUTOMATED COUNT: 17.8 % (ref 10–15)
FOLATE SERPL-MCNC: 13.9 NG/ML (ref 4.6–34.8)
GLUCOSE SERPL-MCNC: 85 MG/DL (ref 70–99)
HCT VFR BLD AUTO: 21.8 % (ref 35–47)
HGB BLD-MCNC: 7 G/DL (ref 11.7–15.7)
IMM GRANULOCYTES # BLD: 0.1 10E3/UL
IMM GRANULOCYTES NFR BLD: 1 %
ISSUE DATE AND TIME: NORMAL
LYMPHOCYTES # BLD AUTO: 0.6 10E3/UL (ref 0.8–5.3)
LYMPHOCYTES NFR BLD AUTO: 9 %
MAGNESIUM SERPL-MCNC: 1.7 MG/DL (ref 1.7–2.3)
MCH RBC QN AUTO: 33.2 PG (ref 26.5–33)
MCHC RBC AUTO-ENTMCNC: 32.1 G/DL (ref 31.5–36.5)
MCV RBC AUTO: 103 FL (ref 78–100)
MONOCYTES # BLD AUTO: 0.6 10E3/UL (ref 0–1.3)
MONOCYTES NFR BLD AUTO: 9 %
NEUTROPHILS # BLD AUTO: 5.1 10E3/UL (ref 1.6–8.3)
NEUTROPHILS NFR BLD AUTO: 78 %
NRBC # BLD AUTO: 0 10E3/UL
NRBC BLD AUTO-RTO: 0 /100
PHOSPHATE SERPL-MCNC: 1.9 MG/DL (ref 2.5–4.5)
PLATELET # BLD AUTO: 188 10E3/UL (ref 150–450)
POTASSIUM SERPL-SCNC: 4.1 MMOL/L (ref 3.4–5.3)
PROT SERPL-MCNC: 6 G/DL (ref 6.4–8.3)
RBC # BLD AUTO: 2.11 10E6/UL (ref 3.8–5.2)
SODIUM SERPL-SCNC: 136 MMOL/L (ref 135–145)
SPECIMEN EXPIRATION DATE: NORMAL
UNIT ABO/RH: NORMAL
UNIT NUMBER: NORMAL
UNIT STATUS: NORMAL
UNIT TYPE ISBT: 600
VIT B12 SERPL-MCNC: >4000 PG/ML (ref 232–1245)
WBC # BLD AUTO: 6.5 10E3/UL (ref 4–11)

## 2024-05-28 PROCEDURE — 84100 ASSAY OF PHOSPHORUS: CPT | Performed by: INTERNAL MEDICINE

## 2024-05-28 PROCEDURE — 82565 ASSAY OF CREATININE: CPT | Performed by: INTERNAL MEDICINE

## 2024-05-28 PROCEDURE — 83735 ASSAY OF MAGNESIUM: CPT | Performed by: INTERNAL MEDICINE

## 2024-05-28 PROCEDURE — P9016 RBC LEUKOCYTES REDUCED: HCPCS | Performed by: STUDENT IN AN ORGANIZED HEALTH CARE EDUCATION/TRAINING PROGRAM

## 2024-05-28 PROCEDURE — 86900 BLOOD TYPING SEROLOGIC ABO: CPT | Performed by: STUDENT IN AN ORGANIZED HEALTH CARE EDUCATION/TRAINING PROGRAM

## 2024-05-28 PROCEDURE — 86923 COMPATIBILITY TEST ELECTRIC: CPT | Performed by: STUDENT IN AN ORGANIZED HEALTH CARE EDUCATION/TRAINING PROGRAM

## 2024-05-28 PROCEDURE — 82374 ASSAY BLOOD CARBON DIOXIDE: CPT | Performed by: INTERNAL MEDICINE

## 2024-05-28 PROCEDURE — 82607 VITAMIN B-12: CPT | Performed by: NURSE PRACTITIONER

## 2024-05-28 PROCEDURE — 120N000001 HC R&B MED SURG/OB

## 2024-05-28 PROCEDURE — C9113 INJ PANTOPRAZOLE SODIUM, VIA: HCPCS | Performed by: INTERNAL MEDICINE

## 2024-05-28 PROCEDURE — 82247 BILIRUBIN TOTAL: CPT | Performed by: INTERNAL MEDICINE

## 2024-05-28 PROCEDURE — 250N000011 HC RX IP 250 OP 636: Performed by: INTERNAL MEDICINE

## 2024-05-28 PROCEDURE — 250N000013 HC RX MED GY IP 250 OP 250 PS 637: Performed by: INTERNAL MEDICINE

## 2024-05-28 PROCEDURE — 85004 AUTOMATED DIFF WBC COUNT: CPT | Performed by: INTERNAL MEDICINE

## 2024-05-28 PROCEDURE — 82746 ASSAY OF FOLIC ACID SERUM: CPT | Performed by: NURSE PRACTITIONER

## 2024-05-28 PROCEDURE — 36415 COLL VENOUS BLD VENIPUNCTURE: CPT | Performed by: INTERNAL MEDICINE

## 2024-05-28 PROCEDURE — 99232 SBSQ HOSP IP/OBS MODERATE 35: CPT | Performed by: STUDENT IN AN ORGANIZED HEALTH CARE EDUCATION/TRAINING PROGRAM

## 2024-05-28 PROCEDURE — 36415 COLL VENOUS BLD VENIPUNCTURE: CPT | Performed by: STUDENT IN AN ORGANIZED HEALTH CARE EDUCATION/TRAINING PROGRAM

## 2024-05-28 RX ADMIN — LISINOPRIL 30 MG: 20 TABLET ORAL at 09:25

## 2024-05-28 RX ADMIN — POTASSIUM & SODIUM PHOSPHATES POWDER PACK 280-160-250 MG 2 PACKET: 280-160-250 PACK at 20:23

## 2024-05-28 RX ADMIN — VANCOMYCIN HYDROCHLORIDE 125 MG: 125 CAPSULE ORAL at 16:34

## 2024-05-28 RX ADMIN — ISOSORBIDE DINITRATE 10 MG: 10 TABLET ORAL at 18:57

## 2024-05-28 RX ADMIN — GUAIFENESIN 600 MG: 600 TABLET, EXTENDED RELEASE ORAL at 20:24

## 2024-05-28 RX ADMIN — ISOSORBIDE DINITRATE 10 MG: 10 TABLET ORAL at 13:03

## 2024-05-28 RX ADMIN — VANCOMYCIN HYDROCHLORIDE 125 MG: 125 CAPSULE ORAL at 13:03

## 2024-05-28 RX ADMIN — POTASSIUM CHLORIDE 10 MEQ: 750 TABLET, FILM COATED, EXTENDED RELEASE ORAL at 09:23

## 2024-05-28 RX ADMIN — VANCOMYCIN HYDROCHLORIDE 125 MG: 125 CAPSULE ORAL at 20:24

## 2024-05-28 RX ADMIN — PANTOPRAZOLE SODIUM 40 MG: 40 INJECTION, POWDER, FOR SOLUTION INTRAVENOUS at 20:24

## 2024-05-28 RX ADMIN — ISOSORBIDE DINITRATE 10 MG: 10 TABLET ORAL at 09:25

## 2024-05-28 RX ADMIN — PANTOPRAZOLE SODIUM 40 MG: 40 INJECTION, POWDER, FOR SOLUTION INTRAVENOUS at 09:25

## 2024-05-28 RX ADMIN — SERTRALINE HYDROCHLORIDE 100 MG: 100 TABLET ORAL at 09:24

## 2024-05-28 RX ADMIN — VANCOMYCIN HYDROCHLORIDE 125 MG: 125 CAPSULE ORAL at 09:23

## 2024-05-28 RX ADMIN — FUROSEMIDE 20 MG: 20 TABLET ORAL at 09:24

## 2024-05-28 RX ADMIN — ACETAMINOPHEN 975 MG: 325 TABLET, FILM COATED ORAL at 09:23

## 2024-05-28 RX ADMIN — GUAIFENESIN 600 MG: 600 TABLET, EXTENDED RELEASE ORAL at 09:25

## 2024-05-28 RX ADMIN — CLONIDINE HYDROCHLORIDE 0.1 MG: 0.1 TABLET ORAL at 22:30

## 2024-05-28 RX ADMIN — ACETAMINOPHEN 975 MG: 325 TABLET, FILM COATED ORAL at 20:24

## 2024-05-28 RX ADMIN — AMLODIPINE BESYLATE 10 MG: 10 TABLET ORAL at 09:25

## 2024-05-28 RX ADMIN — CARVEDILOL 12.5 MG: 12.5 TABLET, FILM COATED ORAL at 09:24

## 2024-05-28 RX ADMIN — ACETAMINOPHEN 975 MG: 325 TABLET, FILM COATED ORAL at 14:47

## 2024-05-28 RX ADMIN — CARVEDILOL 12.5 MG: 12.5 TABLET, FILM COATED ORAL at 18:57

## 2024-05-28 ASSESSMENT — ACTIVITIES OF DAILY LIVING (ADL)
ADLS_ACUITY_SCORE: 60
DEPENDENT_IADLS:: CLEANING;COOKING;LAUNDRY;MEAL PREPARATION;MEDICATION MANAGEMENT;TRANSPORTATION
ADLS_ACUITY_SCORE: 60
ADLS_ACUITY_SCORE: 60
ADLS_ACUITY_SCORE: 56
ADLS_ACUITY_SCORE: 60
ADLS_ACUITY_SCORE: 56
ADLS_ACUITY_SCORE: 60
ADLS_ACUITY_SCORE: 60
ADLS_ACUITY_SCORE: 56
ADLS_ACUITY_SCORE: 60
ADLS_ACUITY_SCORE: 60
ADLS_ACUITY_SCORE: 56
ADLS_ACUITY_SCORE: 60

## 2024-05-28 NOTE — CONSULTS
Care Management Initial Consult    General Information  Assessment completed with: Care Team Member, Children, Patient  Type of CM/SW Visit: Initial Assessment    Primary Care Provider verified and updated as needed: No   Readmission within the last 30 days:        Reason for Consult: discharge planning  Advance Care Planning: Advance Care Planning Reviewed: present on chart          Communication Assessment  Patient's communication style: spoken language (English or Bilingual)             Cognitive  Cognitive/Neuro/Behavioral: .WDL except  Level of Consciousness: intermittent confusion  Arousal Level: opens eyes spontaneously  Orientation: disoriented to, time, place  Mood/Behavior: calm, cooperative  Best Language: 0 - No aphasia  Speech: clear    Living Environment:   People in home: alone, facility resident     Current living Arrangements: assisted living  Name of Facility: [facility]   Able to return to prior arrangements: yes       Family/Social Support:  Care provided by: self, other (see comments) (facility staff)  Provides care for: no one     Children          Description of Support System: Supportive, Involved    Support Assessment: Adequate family and caregiver support    Current Resources:   Patient receiving home care services: No     Community Resources: None  Equipment currently used at home:    Supplies currently used at home: Incontinence Supplies    Employment/Financial:  Employment Status:          Financial Concerns:     Referral to Financial Worker: No       Does the patient's insurance plan have a 3 day qualifying hospital stay waiver?  Yes     Which insurance plan 3 day waiver is available? Alternative insurance waiver    Will the waiver be used for post-acute placement? Yes    Lifestyle & Psychosocial Needs:  Social Determinants of Health     Food Insecurity: Not on file   Depression: Not at risk (1/15/2019)    PHQ-2     PHQ-2 Score: 0   Housing Stability: Not on  file   Tobacco Use: Medium Risk (5/17/2024)    Patient History     Smoking Tobacco Use: Former     Smokeless Tobacco Use: Never     Passive Exposure: Not on file   Financial Resource Strain: Not on file   Alcohol Use: Not on file   Transportation Needs: Not on file   Physical Activity: Not on file   Interpersonal Safety: Not on file   Stress: Not on file   Social Connections: Not on file   Health Literacy: Not on file       Functional Status:  Prior to admission patient needed assistance:   Dependent ADLs:: Bathing, Transfers, Wheelchair-independent  Dependent IADLs:: Cleaning, Cooking, Laundry, Meal Preparation, Medication Management, Transportation       Mental Health Status:          Chemical Dependency Status:  Chemical Dependency Status: No Current Concerns             Values/Beliefs:  Spiritual, Cultural Beliefs, Religion Practices, Values that affect care:                 Additional Information:  Patient has a high URR. SW went to patient's room but family was not present. SW called son Gael via phone. Patient was at [facility] TCU. She has a bed hold that was verified. Gael could prefer a w/c ride at discharge. Reviewed out of pocket cost for [employer] transport, $89.98 base and $5.79 per mile to the destination. Spoke with patients son Gael, they expressed understanding and are agreeable to this.      Gael will be present at the TCU when she gets there. Gael does not have a preferred time for transport.     DOM will continue to follow for discharge planning.     PRISCILA Maria, [facility]  Emergency Room   Please contact the SW on the floor in which the patient is staying for any questions or concerns

## 2024-05-28 NOTE — PROGRESS NOTES
"GASTROENTEROLOGY PROGRESS NOTE     CC: Colitis     SUBJECTIVE:  She asked me to get out of the room.     Says, \"I don't want to see your face, you are making me sick\"     OBJECTIVE:  General Appearance:  Not in distress. Lying in bed.  BP (!) 142/59   Pulse 82   Temp 99.3  F (37.4  C) (Temporal)   Resp 16   Ht 1.626 m (5' 4\")   Wt 53.7 kg (118 lb 6.2 oz)   SpO2 98%   BMI 20.32 kg/m    Temp (24hrs), Av.8  F (37.1  C), Min:98.2  F (36.8  C), Max:99.3  F (37.4  C)    Patient Vitals for the past 72 hrs:   Weight   24 0634 53.7 kg (118 lb 6.2 oz)   24 0303 53.4 kg (117 lb 11.6 oz)       Intake/Output Summary (Last 24 hours) at 2024 0955  Last data filed at 2024 0609  Gross per 24 hour   Intake 1510 ml   Output 100 ml   Net 1410 ml        PHYSICAL EXAM  Pt declined physical exam           Recent Labs   Lab Test 24  0635 24  0658 24  1230 24  0716 24  0717 20  0454 20  0928 19  0716 19  0850   WBC 6.5 8.9 21.6*   < > 8.6   < > 3.5*   < > 29.2*   HGB 7.0* 7.2* 9.4*   < > 9.1*   < > 13.5   < > 9.0*   * 104* 103*   < > 103*   < > 99   < > 103*    205 296   < > 194   < > 168   < > 181   INR  --   --   --   --  1.12  --  1.13  --  1.18*    < > = values in this interval not displayed.     Recent Labs   Lab Test 24  0635 24  0658 24  1230   POTASSIUM 4.1 4.1 4.8   CHLORIDE 106 107 104   CO2 20* 18* 19*   BUN 13.5 30.8* 35.0*   ANIONGAP 10 11 11     Recent Labs   Lab Test 24  0635 24  0457 24  0012 24  1636 23  1748 23  1810 23  0644 20  1035 20  0928 19  1703 19  1531   ALBUMIN 3.2*  --  3.8  --   --   --  4.0   < > 3.7   < > 3.5   BILITOTAL 0.6  --  0.8  --   --   --  1.1   < > 1.3   < > 0.6   ALT 5  --  5  --   --   --  8*   < > 12   < > 17   AST 10  --  16  --   --   --  14   < > 15   < > 24   PROTEIN  --  Negative  --  30* Negative   < >  --    " < >  --    < >  --    LIPASE  --   --  26  --   --   --   --   --  42*  --  41*    < > = values in this interval not displayed.        Imaging and procedures:    CT 5/26  IMPRESSION:  1.  Moderate colitis, likely infectious or inflammatory.   2.  Proximal esophageal wall thickening, likely secondary to esophagitis.    I have reviewed the patient's new clinical lab results    Problem list pertaining to GI:  C-diff colitis   Anemia    Assessment: This is a 89 y-o female with a PMH of recent left leg fracture s/p ORIF, discharged to TCU on 5/8, now presenting with vomiting and diarrhea for a few days.  She has history of C-diff colitis. C-diff PCR positive. On oral vanco 125 mg four times daily. CT showed esophagitis and colitis.       C-diff colitis:   - Continue oral vanco 125 mg every 6 hours for 2 weeks   - Follow-up in [facility] for PO FMT- Stay on vanco 125 mg bid until seen in [facility]    Macrocytic Anemia: Hgb trending down (9.4 > 7.2 > 7.0), No signs of overt GI bleed.   - Check vitamin B12 and folic acid  - Monitor hgb and transfuse  - Continue protonix BID  - Hold aspirin 325    I will discuss with Dr. Hemphill    Time spent: 20 minutes, greater than 50% of the visit was spent in counseling/coordination of care.     Faye Garcia CNP  [facility] ([facility])  Mobile # [phone]

## 2024-05-28 NOTE — PLAN OF CARE
"Goal Outcome Evaluation:      Plan of Care Reviewed With: patient    Overall Patient Progress: improvingOverall Patient Progress: improving    Outcome Evaluation: Agitated and withdrawn. Just wants to be left alone. Refusing interventions and cares. Low Hgb - RBC transfused. poor appetited. LS /Coarse crackles. Oxygen weaned down to 1/2L NC. Alert/Oriented to self.   BP (!) 149/60   Pulse 68   Temp 98.3  F (36.8  C) (Temporal)   Resp 16   Ht 1.626 m (5' 4\")   Wt 53.7 kg (118 lb 6.2 oz)   SpO2 96%   BMI 20.32 kg/m      Problem: Adult Inpatient Plan of Care  Goal: Plan of Care Review  Description: The Plan of Care Review/Shift note should be completed every shift.  The Outcome Evaluation is a brief statement about your assessment that the patient is improving, declining, or no change.  This information will be displayed automatically on your shift  note.  Outcome: Progressing  Flowsheets (Taken 5/28/2024 8581)  Outcome Evaluation: Agitated and withdrawn. Just wants to be left alone. Refusing interventions and cares. Low Hgb - RBC transfused. poor appetited. LS /Coarse crackles. Oxygen weaned down to 1/2L NC.  Plan of Care Reviewed With: patient  Overall Patient Progress: improving  Goal: Patient-Specific Goal (Individualized)  Description: You can add care plan individualizations to a care plan. Examples of Individualization might be:  \"Parent requests to be called daily at 9am for status\", \"I have a hard time hearing out of my right ear\", or \"Do not touch me to wake me up as it startles  me\".  Outcome: Progressing  Goal: Absence of Hospital-Acquired Illness or Injury  Outcome: Progressing  Intervention: Identify and Manage Fall Risk  Recent Flowsheet Documentation  Taken 5/28/2024 3443 by Michelle Thorpe, RN  Safety Promotion/Fall Prevention:   activity supervised   safety round/check completed   assistive device/personal items within reach   clutter free environment maintained   increased rounding and " observation   increase visualization of patient   patient and family education   nonskid shoes/slippers when out of bed  Intervention: Prevent Skin Injury  Recent Flowsheet Documentation  Taken 5/28/2024 0925 by Michelle Thorpe RN  Body Position:   weight shifting   supine, head elevated   supine, legs elevated  Intervention: Prevent Infection  Recent Flowsheet Documentation  Taken 5/28/2024 0925 by Michelle Thorpe RN  Infection Prevention:   single patient room provided   personal protective equipment utilized   cohorting utilized  Goal: Optimal Comfort and Wellbeing  Outcome: Progressing  Goal: Readiness for Transition of Care  Outcome: Progressing     Problem: Gastroenteritis  Goal: Effective Diarrhea Management  Outcome: Progressing  Goal: Fluid and Electrolyte Balance  Outcome: Progressing  Goal: Nausea and Vomiting Relief  Outcome: Progressing     Problem: Comorbidity Management  Goal: Maintenance of Behavioral Health Symptom Control  Outcome: Progressing  Intervention: Maintain Behavioral Health Symptom Control  Recent Flowsheet Documentation  Taken 5/28/2024 0925 by Michelle Thorpe RN  Medication Review/Management:   medications reviewed   high-risk medications identified  Goal: Blood Pressure in Desired Range  Outcome: Progressing  Intervention: Maintain Blood Pressure Management  Recent Flowsheet Documentation  Taken 5/28/2024 0925 by Michelle Thorpe RN  Medication Review/Management:   medications reviewed   high-risk medications identified

## 2024-05-28 NOTE — PLAN OF CARE
"Goal Outcome Evaluation:      Plan of Care Reviewed With: patient    Overall Patient Progress: no changeOverall Patient Progress: no change    Outcome Evaluation: Weaned to 1L NC but desats to 89% on RA, alert to self only, LS coarse, freq congested cough, lift for OOB, incisions to L LE KHADIJAH and C/D/I, CMS+ ex doppler for dorsal pedals, Tylenol/Robaxin for abd and chronic back pain, inc b/b, 3 small loose bm's today, PO Vanco cont, reddened sacrum/kayce area, barrier and nystatin cream applied, will cont to monitor.    Problem: Adult Inpatient Plan of Care  Goal: Plan of Care Review  Description: The Plan of Care Review/Shift note should be completed every shift.  The Outcome Evaluation is a brief statement about your assessment that the patient is improving, declining, or no change.  This information will be displayed automatically on your shift  note.  Outcome: Progressing  Flowsheets (Taken 5/27/2024 1938)  Outcome Evaluation: Weaned to 1L NC but desats to 89% on RA, alert to self only, LS coarse, freq congested cough, lift for OOB, incisions to L LE KHADIJAH and C/D/I, CMS+ ex doppler for dorsal pedals, Tylenol/Robaxin for abd and chronic back pain, inc b/b, 3 small loose bm's today, PO Vanco cont, reddened sacrum/kayce area, barrier and nystatin cream applied, will cont to monitor.  Plan of Care Reviewed With: patient  Overall Patient Progress: no change  Goal: Patient-Specific Goal (Individualized)  Description: You can add care plan individualizations to a care plan. Examples of Individualization might be:  \"Parent requests to be called daily at 9am for status\", \"I have a hard time hearing out of my right ear\", or \"Do not touch me to wake me up as it startles  me\".  Outcome: Progressing  Goal: Absence of Hospital-Acquired Illness or Injury  Outcome: Progressing  Intervention: Identify and Manage Fall Risk  Recent Flowsheet Documentation  Taken 5/27/2024 6975 by Lydia Lawton RN  Safety Promotion/Fall Prevention:   " activity supervised   assistive device/personal items within reach   clutter free environment maintained   increase visualization of patient   lighting adjusted   nonskid shoes/slippers when out of bed   patient and family education   room organization consistent   safety round/check completed   supervised activity   treat reversible contributory factors   treat underlying cause  Intervention: Prevent Skin Injury  Recent Flowsheet Documentation  Taken 5/27/2024 0835 by Lydia Lawton RN  Body Position:   supine, head elevated   supine, legs elevated  Skin Protection: adhesive use limited  Device Skin Pressure Protection:   adhesive use limited   tubing/devices free from skin contact  Intervention: Prevent and Manage VTE (Venous Thromboembolism) Risk  Recent Flowsheet Documentation  Taken 5/27/2024 0835 by Lydia Lawton RN  VTE Prevention/Management: SCDs (sequential compression devices) on  Intervention: Prevent Infection  Recent Flowsheet Documentation  Taken 5/27/2024 0835 by Lydia Lawton RN  Infection Prevention:   hand hygiene promoted   personal protective equipment utilized   rest/sleep promoted   single patient room provided  Goal: Optimal Comfort and Wellbeing  Outcome: Progressing  Intervention: Monitor Pain and Promote Comfort  Recent Flowsheet Documentation  Taken 5/27/2024 0835 by Lydia Lawton RN  Pain Management Interventions: medication (see MAR)  Goal: Readiness for Transition of Care  Outcome: Progressing     Problem: Gastroenteritis  Goal: Effective Diarrhea Management  Outcome: Progressing  Intervention: Manage Diarrhea  Recent Flowsheet Documentation  Taken 5/27/2024 0835 by Lydia Lawton RN  Skin Protection: adhesive use limited  Perineal Care:   absorbent brief/pad changed   perineum cleansed   protective cream/ointment applied  Goal: Fluid and Electrolyte Balance  Outcome: Progressing  Intervention: Monitor and Manage Fluid and Electrolyte Balance  Recent Flowsheet  Documentation  Taken 5/27/2024 0835 by Lydia Lawton, RN  Fluid/Electrolyte Management: fluids provided  Goal: Nausea and Vomiting Relief  Outcome: Progressing  Intervention: Prevent and Manage Nausea and Vomiting  Recent Flowsheet Documentation  Taken 5/27/2024 0835 by Lydia Lawton RN  Environmental Support:   calm environment promoted   rest periods encouraged   personal routine supported     Problem: Comorbidity Management  Goal: Maintenance of Behavioral Health Symptom Control  Outcome: Progressing  Intervention: Maintain Behavioral Health Symptom Control  Recent Flowsheet Documentation  Taken 5/27/2024 0835 by Lydia Lawton RN  Medication Review/Management: medications reviewed  Goal: Blood Pressure in Desired Range  Outcome: Progressing  Intervention: Maintain Blood Pressure Management  Recent Flowsheet Documentation  Taken 5/27/2024 0835 by Lydia Lawton RN  Medication Review/Management: medications reviewed

## 2024-05-28 NOTE — PROGRESS NOTES
[facility]    Hospitalist Progress Note      Assessment & Plan   Daya Ignacio is a 89 year old woman who was admitted on 5/25/2024. PMH significant for HTN, NSTEMI, MDD, anxiety, mild cognitive impairment, anemia, chronic back pain from compression fractures, UTI, GI bleed, nephrolithiasis, C. difficile, and falls with previous fractures including left tib/fib, pubic rami, and hip. She presented from [facility] where she was rehabbing after 5/3/24 ORIF for acute comminuted distal left tib/fib fracture. She presented with 2 days of vomiting and diarrhea. Leukocytosis noted in the ER with WBC 28k. CT chest abdomen pelvis revealed colitis. There was concern for recurrent C diff colitis and patient was admitted for further evaluation and treatment.     Leukocytosis  Colitis: Suspicious for C. difficile colitis  History of C diff colitis  Vomiting and diarrhea  -Past history of C. difficile colitis  - Recent hospitalization and antibiotics for UTI (see below for ongoing management of urine culture)  - Enteric panel negative.  - C diff with positive PCR, toxin negative, though given high clinical suspicion, likely C diff recurrence.   - Appreciate GI consultation and recommendations. Continue 14 day treatment followed by vancomycin 125 mg BID x 4 weeks (or until [facility] follow up). [facility] follow [facility] to determine if PO fecal microbiota transplant (Vowst) an option for her.   - WBC improved drastically on PO vanc from 28.8 -> 21.6 -> 8.9.  - Stopped IV fluids 5/27 and continue to encourage PO intake.   - Monitor electrolytes  - Symptomatic treatment of nausea and vomiting  - Contact precautions     Acute on chronic anemia, macrocytic  GERD  History of GI bleed  Hgb since admission has decreased from 10.8 -> 9.4 -> 7.2-->7. No overt evidence of bleeding.  Patient appears pale.  Patient  on aspirin 325 mg daily for DVT prophylaxis for 6 weeks following ORIF through 6/14/24.   Hold aspirin  for now  Continue IV pantoprazole 40 mg twice daily  Discussed with patient about blood transfusion which she refused and asked me to shut up and get out.  Patient was not willing to talk more about it.  she did not give me a reason that why she does not want a blood transfusion.  Unable to assess her capacity given lack of cooperation.  I called her son Gael to discuss this.  Gael said that her mother acts like this when she is unwell and it is okay to proceed with transfusion.   Tye is also planning to stop by at the hospital and discussed this with her mother.  1 unit packed red blood cells in order  Monitor hemoglobin    Subacute respiratory failure with hypoxia  Subacute cough  Patient has had ongoing cough and minimal O2 requirement since recent admission.  5/26/24 CT chest with contrast with mild bronchial wall thickening. No pulmonary infiltrate.  Will continue patient's Mucinex at least initially.   Wean O2 as able.   Resume PTA Lasix / K.     Chronic back pain  History of vertebral compression fractures T11-L3  Status post 5/3 distal left tib/fib ORIF   Patient is status post 5/3/24 ORIF to acute comminuted distal left tib/fib fracture.  Patient is to be wearing boot when out of bed, NWB left leg until at least next ortho follow up 6/25. Boot should be brought from facility if possible.   Aspirin 325 mg daily for DVT prophylaxis for 6 weeks following ORIF through 6/14/24 if possible. Currently on hold due to down trending hb.   Continue vitamin D supplementation.  Continue scheduled Tylenol.  PRN methocarbamol 500 mg TID.   5/23, rehab changed from PRN oxycodone to PRN hydromorphone 2 mg. They also noted that patient did not have insight to request PRN medications and scheduled 1 dose hydromorphone in the morning. However, here, patient has frequently declined pain and will not schedule. Pain actually seems reasonably controlled without opioids at this time. Would monitor and continue to discuss pain  with nursing and assess patient's complaints and ability to participate with therapy. If needed, would add PRN hydromorphone, though will not order at this time.     Abnormal urine culture  History of UTIs and nephrolithiasis  Patient with > 100 GNR in urine culture. However, patient has not had a negative urine culture going back to 5/2021.  She has history of recurrent UTIs as far back as 2014.  Patient without urinary symptoms.    5/26/24 CT abdomen notes 6 mm nonobstructing left renal stone; otherwise unremarkable. No stranding or signs suggestive of inflammation.   Suspect colonization versus contamination. Will not treat at this time unless there is a clinical change or hemodynamic instability.  Discussed this with son Gael who is in agreement with the plan    HTN  PTA on carvedilol 12.5 mg twice daily, lisinopril 30 mg daily, Isordil 10 mg 3 times daily, amlodipine 10 mg daily, and Lasix 20 mg daily. Resuming PTA regimen.   Monitor BP.     Mild cognitive impairment  Has some mild issues with memory per family.  Per son Gael,  patient acts hostile when she is unwell.   -Came to emergency room from TCU  -Fall precautions     MDD  Anxiety  - Continue PTA sertraline 100 mg daily.    DVT Prophylaxis: Pneumatic Compression Devices   Code Status: No CPR- Do NOT Intubate  Expected discharge: Anticipate that she will return to TCU once medically ready. SW consulted. PT to consulted while here.     Hazel Rose MD   Hospitalist Service  [facility]      Interval History   No acute events overnight.  This morning patient was cleaned around 6 AM which caused disturbance in her sleep.  When I saw this patient she was quite upset and asked me to shut up and get out.  She was not willing to answer any question.  She was not willing to talk about transfusion.  Unable to assess capacity.  Discussed this with patient's son Gael.  Per Gael this is not unusual.  She usually does that when she is not feeling well  or when her sleep gets disturbed.    Unable to do review of system given lack of cooperation    -Data reviewed today: I reviewed all new labs and imaging results over the last 24 hours.       Physical Exam   Temp: 99.3  F (37.4  C) Temp src: Temporal BP: (!) 142/59 Pulse: 82   Resp: 16 SpO2: 98 % O2 Device: Nasal cannula Oxygen Delivery: 2 LPM  Vitals:    05/26/24 0303 05/26/24 0634   Weight: 53.4 kg (117 lb 11.6 oz) 53.7 kg (118 lb 6.2 oz)     Vital Signs with Ranges  Temp:  [98.2  F (36.8  C)-99.3  F (37.4  C)] 99.3  F (37.4  C)  Pulse:  [78-92] 82  Resp:  [16-18] 16  BP: (114-147)/(46-59) 142/59  SpO2:  [94 %-98 %] 98 %  I/O last 3 completed shifts:  In: 1750 [P.O.:390; I.V.:1360]  Out: 100 [Urine:100]    Constitutional: Sleeping but easily awakened to voice, irritable  HEENT: NCAT.   Respiratory: Clear to auscultation bilaterally. No crackles or wheezes. Cardiovascular: Regular rate and rhythm. No murmur.  GI: Soft, nontender, nondistended. Hyperactive bowel sounds.   Musculoskeletal: No pitting edema  Neurologic:  Oriented only to person.  Refused to follow command  Medications   Current Facility-Administered Medications   Medication Dose Route Frequency Provider Last Rate Last Admin     Current Facility-Administered Medications   Medication Dose Route Frequency Provider Last Rate Last Admin    acetaminophen (TYLENOL) tablet 975 mg  975 mg Oral TID Arina Carpenter MD   975 mg at 05/28/24 0923    amLODIPine (NORVASC) tablet 10 mg  10 mg Oral Daily Fany Dos Santos MD   10 mg at 05/28/24 0925    [Held by provider] aspirin (ASA) EC tablet 325 mg  325 mg Oral Daily Fany Dos Santos MD   325 mg at 05/27/24 0836    carvedilol (COREG) tablet 12.5 mg  12.5 mg Oral BID w/meals Arina Carpenter MD   12.5 mg at 05/28/24 0924    cloNIDine (CATAPRES) tablet 0.1 mg  0.1 mg Oral At Bedtime Arina Carpenter MD   0.1 mg at 05/27/24 2595    furosemide (LASIX) tablet 20 mg  20 mg Oral Daily aPuline  Arina Hutchins MD   20 mg at 05/28/24 0924    guaiFENesin (MUCINEX) 12 hr tablet 600 mg  600 mg Oral BID Arina Carpenter MD   600 mg at 05/28/24 0925    isosorbide dinitrate (ISORDIL) tablet 10 mg  10 mg Oral TID Fany Dos Santos MD   10 mg at 05/28/24 1303    lisinopril (ZESTRIL) tablet 30 mg  30 mg Oral Daily Fany Dos Santos MD   30 mg at 05/28/24 0925    pantoprazole (PROTONIX) IV push injection 40 mg  40 mg Intravenous Q12H Matthew Lozoya MD   40 mg at 05/28/24 0925    potassium chloride ER (K-TAB/KLOR-CON) CR tablet 10 mEq  10 mEq Oral Daily Arina Carpenter MD   10 mEq at 05/28/24 0923    sertraline (ZOLOFT) tablet 100 mg  100 mg Oral Daily Fany Dos Santos MD   100 mg at 05/28/24 0924    sodium chloride (PF) 0.9% PF flush 3 mL  3 mL Intracatheter Q8H Fany Dos Santos MD   3 mL at 05/28/24 0926    vancomycin (VANCOCIN) capsule 125 mg  125 mg Oral 4x Daily Fany Dos Santos MD   125 mg at 05/28/24 1303       Data   Recent Labs   Lab 05/28/24  0635 05/27/24  0658 05/26/24  1230 05/26/24  0012   WBC 6.5 8.9 21.6* 28.8*   HGB 7.0* 7.2* 9.4* 10.8*   * 104* 103* 101*    205 296 421    136 134* 134*   POTASSIUM 4.1 4.1 4.8 5.1   CHLORIDE 106 107 104 99   CO2 20* 18* 19* 20*   BUN 13.5 30.8* 35.0* 41.1*   CR 0.44* 0.56 0.65 0.80   ANIONGAP 10 11 11 15   DAVE 9.3 8.8 9.1 10.4*   GLC 85 88 121* 143*   ALBUMIN 3.2*  --   --  3.8   PROTTOTAL 6.0*  --   --  7.5   BILITOTAL 0.6  --   --  0.8   ALKPHOS 179*  --   --  289*   ALT 5  --   --  5   AST 10  --   --  16   LIPASE  --   --   --  26       No results found for this or any previous visit (from the past 24 hour(s)).

## 2024-05-28 NOTE — PLAN OF CARE
"Goal Outcome Evaluation:      Plan of Care Reviewed With: patient    Overall Patient Progress: improvingOverall Patient Progress: improving    Outcome Evaluation: No nausea or vomiting. No s/sx of bleeding. No complaints of abdominal pain. Afebrile.    Pt is alert, intermittent confusion and agitation. Fluids stopped per ordered. Congested cough, coarse LS. Scheduled Tylenol for back pain. NWB LLE. Up with ceiling lift. Heels floated. Discharge pending.       Problem: Adult Inpatient Plan of Care  Goal: Plan of Care Review  Description: The Plan of Care Review/Shift note should be completed every shift.  The Outcome Evaluation is a brief statement about your assessment that the patient is improving, declining, or no change.  This information will be displayed automatically on your shift  note.  Outcome: Progressing  Flowsheets (Taken 5/28/2024 0608)  Outcome Evaluation: No nausea or vomiting. No s/sx of bleeding. No complaints of abdominal pain. Afebrile.  Plan of Care Reviewed With: patient  Overall Patient Progress: improving  Goal: Patient-Specific Goal (Individualized)  Description: You can add care plan individualizations to a care plan. Examples of Individualization might be:  \"Parent requests to be called daily at 9am for status\", \"I have a hard time hearing out of my right ear\", or \"Do not touch me to wake me up as it startles  me\".  Outcome: Progressing  Goal: Absence of Hospital-Acquired Illness or Injury  Outcome: Progressing  Intervention: Identify and Manage Fall Risk  Recent Flowsheet Documentation  Taken 5/27/2024 2000 by Bree Zheng, RN  Safety Promotion/Fall Prevention:   activity supervised   safety round/check completed  Goal: Optimal Comfort and Wellbeing  Outcome: Progressing  Intervention: Monitor Pain and Promote Comfort  Recent Flowsheet Documentation  Taken 5/27/2024 1952 by Bree Zheng, RN  Pain Management Interventions: medication (see MAR)  Goal: Readiness for " Transition of Care  Outcome: Progressing     Problem: Gastroenteritis  Goal: Effective Diarrhea Management  Outcome: Progressing  Goal: Fluid and Electrolyte Balance  Outcome: Progressing  Goal: Nausea and Vomiting Relief  Outcome: Progressing     Problem: Comorbidity Management  Goal: Maintenance of Behavioral Health Symptom Control  Outcome: Progressing  Intervention: Maintain Behavioral Health Symptom Control  Recent Flowsheet Documentation  Taken 5/27/2024 2000 by Bree Zheng RN  Medication Review/Management:   medications reviewed   high-risk medications identified  Goal: Blood Pressure in Desired Range  Outcome: Progressing  Intervention: Maintain Blood Pressure Management  Recent Flowsheet Documentation  Taken 5/27/2024 2000 by Bree Zheng RN  Medication Review/Management:   medications reviewed   high-risk medications identified

## 2024-05-29 LAB
BASOPHILS # BLD AUTO: 0 10E3/UL (ref 0–0.2)
BASOPHILS NFR BLD AUTO: 0 %
EOSINOPHIL # BLD AUTO: 0.2 10E3/UL (ref 0–0.7)
EOSINOPHIL NFR BLD AUTO: 3 %
ERYTHROCYTE [DISTWIDTH] IN BLOOD BY AUTOMATED COUNT: 17 % (ref 10–15)
ERYTHROCYTE [DISTWIDTH] IN BLOOD BY AUTOMATED COUNT: 17.1 % (ref 10–15)
HCT VFR BLD AUTO: 26.7 % (ref 35–47)
HCT VFR BLD AUTO: 27.1 % (ref 35–47)
HGB BLD-MCNC: 8.9 G/DL (ref 11.7–15.7)
HGB BLD-MCNC: 9 G/DL (ref 11.7–15.7)
HOLD SPECIMEN: NORMAL
IMM GRANULOCYTES # BLD: 0.1 10E3/UL
IMM GRANULOCYTES NFR BLD: 1 %
LYMPHOCYTES # BLD AUTO: 0.8 10E3/UL (ref 0.8–5.3)
LYMPHOCYTES NFR BLD AUTO: 12 %
MAGNESIUM SERPL-MCNC: 1.5 MG/DL (ref 1.7–2.3)
MCH RBC QN AUTO: 33.2 PG (ref 26.5–33)
MCH RBC QN AUTO: 33.3 PG (ref 26.5–33)
MCHC RBC AUTO-ENTMCNC: 33.2 G/DL (ref 31.5–36.5)
MCHC RBC AUTO-ENTMCNC: 33.3 G/DL (ref 31.5–36.5)
MCV RBC AUTO: 100 FL (ref 78–100)
MCV RBC AUTO: 100 FL (ref 78–100)
MONOCYTES # BLD AUTO: 0.7 10E3/UL (ref 0–1.3)
MONOCYTES NFR BLD AUTO: 10 %
NEUTROPHILS # BLD AUTO: 5.1 10E3/UL (ref 1.6–8.3)
NEUTROPHILS NFR BLD AUTO: 74 %
NRBC # BLD AUTO: 0 10E3/UL
NRBC BLD AUTO-RTO: 0 /100
PHOSPHATE SERPL-MCNC: 3.5 MG/DL (ref 2.5–4.5)
PLATELET # BLD AUTO: 199 10E3/UL (ref 150–450)
PLATELET # BLD AUTO: 208 10E3/UL (ref 150–450)
POTASSIUM SERPL-SCNC: 4.3 MMOL/L (ref 3.4–5.3)
RBC # BLD AUTO: 2.67 10E6/UL (ref 3.8–5.2)
RBC # BLD AUTO: 2.71 10E6/UL (ref 3.8–5.2)
RETICS # AUTO: 0.02 10E6/UL (ref 0.03–0.1)
RETICS # AUTO: 0.02 10E6/UL (ref 0.03–0.1)
RETICS/RBC NFR AUTO: 0.7 % (ref 0.5–2)
RETICS/RBC NFR AUTO: 0.8 % (ref 0.5–2)
TSH SERPL DL<=0.005 MIU/L-ACNC: 1.89 UIU/ML (ref 0.3–4.2)
WBC # BLD AUTO: 6.9 10E3/UL (ref 4–11)
WBC # BLD AUTO: 6.9 10E3/UL (ref 4–11)

## 2024-05-29 PROCEDURE — 85027 COMPLETE CBC AUTOMATED: CPT | Performed by: STUDENT IN AN ORGANIZED HEALTH CARE EDUCATION/TRAINING PROGRAM

## 2024-05-29 PROCEDURE — 36415 COLL VENOUS BLD VENIPUNCTURE: CPT | Performed by: STUDENT IN AN ORGANIZED HEALTH CARE EDUCATION/TRAINING PROGRAM

## 2024-05-29 PROCEDURE — 120N000001 HC R&B MED SURG/OB

## 2024-05-29 PROCEDURE — 99207 BLOOD MORPHOLOGY PATHOLOGIST REVIEW: CPT | Performed by: PATHOLOGY

## 2024-05-29 PROCEDURE — 85025 COMPLETE CBC W/AUTO DIFF WBC: CPT | Performed by: STUDENT IN AN ORGANIZED HEALTH CARE EDUCATION/TRAINING PROGRAM

## 2024-05-29 PROCEDURE — 84100 ASSAY OF PHOSPHORUS: CPT | Performed by: INTERNAL MEDICINE

## 2024-05-29 PROCEDURE — 99232 SBSQ HOSP IP/OBS MODERATE 35: CPT | Performed by: STUDENT IN AN ORGANIZED HEALTH CARE EDUCATION/TRAINING PROGRAM

## 2024-05-29 PROCEDURE — 250N000011 HC RX IP 250 OP 636: Performed by: STUDENT IN AN ORGANIZED HEALTH CARE EDUCATION/TRAINING PROGRAM

## 2024-05-29 PROCEDURE — C9113 INJ PANTOPRAZOLE SODIUM, VIA: HCPCS | Performed by: INTERNAL MEDICINE

## 2024-05-29 PROCEDURE — 85045 AUTOMATED RETICULOCYTE COUNT: CPT | Performed by: STUDENT IN AN ORGANIZED HEALTH CARE EDUCATION/TRAINING PROGRAM

## 2024-05-29 PROCEDURE — 84132 ASSAY OF SERUM POTASSIUM: CPT | Performed by: STUDENT IN AN ORGANIZED HEALTH CARE EDUCATION/TRAINING PROGRAM

## 2024-05-29 PROCEDURE — 250N000011 HC RX IP 250 OP 636: Performed by: INTERNAL MEDICINE

## 2024-05-29 PROCEDURE — 250N000013 HC RX MED GY IP 250 OP 250 PS 637: Performed by: INTERNAL MEDICINE

## 2024-05-29 PROCEDURE — 83735 ASSAY OF MAGNESIUM: CPT | Performed by: STUDENT IN AN ORGANIZED HEALTH CARE EDUCATION/TRAINING PROGRAM

## 2024-05-29 PROCEDURE — 84443 ASSAY THYROID STIM HORMONE: CPT | Performed by: STUDENT IN AN ORGANIZED HEALTH CARE EDUCATION/TRAINING PROGRAM

## 2024-05-29 RX ORDER — MAGNESIUM SULFATE HEPTAHYDRATE 40 MG/ML
4 INJECTION, SOLUTION INTRAVENOUS ONCE
Status: COMPLETED | OUTPATIENT
Start: 2024-05-29 | End: 2024-05-29

## 2024-05-29 RX ADMIN — VANCOMYCIN HYDROCHLORIDE 125 MG: 125 CAPSULE ORAL at 15:15

## 2024-05-29 RX ADMIN — ACETAMINOPHEN 975 MG: 325 TABLET, FILM COATED ORAL at 10:56

## 2024-05-29 RX ADMIN — FUROSEMIDE 20 MG: 20 TABLET ORAL at 10:52

## 2024-05-29 RX ADMIN — POTASSIUM & SODIUM PHOSPHATES POWDER PACK 280-160-250 MG 2 PACKET: 280-160-250 PACK at 04:51

## 2024-05-29 RX ADMIN — GUAIFENESIN 600 MG: 600 TABLET, EXTENDED RELEASE ORAL at 10:55

## 2024-05-29 RX ADMIN — MAGNESIUM SULFATE HEPTAHYDRATE 4 G: 40 INJECTION, SOLUTION INTRAVENOUS at 12:34

## 2024-05-29 RX ADMIN — POTASSIUM & SODIUM PHOSPHATES POWDER PACK 280-160-250 MG 2 PACKET: 280-160-250 PACK at 00:45

## 2024-05-29 RX ADMIN — ACETAMINOPHEN 975 MG: 325 TABLET, FILM COATED ORAL at 15:14

## 2024-05-29 RX ADMIN — VANCOMYCIN HYDROCHLORIDE 125 MG: 125 CAPSULE ORAL at 19:54

## 2024-05-29 RX ADMIN — GUAIFENESIN 600 MG: 600 TABLET, EXTENDED RELEASE ORAL at 19:54

## 2024-05-29 RX ADMIN — ISOSORBIDE DINITRATE 10 MG: 10 TABLET ORAL at 10:52

## 2024-05-29 RX ADMIN — ISOSORBIDE DINITRATE 10 MG: 10 TABLET ORAL at 15:15

## 2024-05-29 RX ADMIN — CLONIDINE HYDROCHLORIDE 0.1 MG: 0.1 TABLET ORAL at 22:29

## 2024-05-29 RX ADMIN — PANTOPRAZOLE SODIUM 40 MG: 40 INJECTION, POWDER, FOR SOLUTION INTRAVENOUS at 22:46

## 2024-05-29 RX ADMIN — ISOSORBIDE DINITRATE 10 MG: 10 TABLET ORAL at 18:34

## 2024-05-29 RX ADMIN — AMLODIPINE BESYLATE 10 MG: 10 TABLET ORAL at 10:52

## 2024-05-29 RX ADMIN — CARVEDILOL 12.5 MG: 12.5 TABLET, FILM COATED ORAL at 18:34

## 2024-05-29 RX ADMIN — ACETAMINOPHEN 975 MG: 325 TABLET, FILM COATED ORAL at 19:54

## 2024-05-29 RX ADMIN — PANTOPRAZOLE SODIUM 40 MG: 40 INJECTION, POWDER, FOR SOLUTION INTRAVENOUS at 12:34

## 2024-05-29 RX ADMIN — VANCOMYCIN HYDROCHLORIDE 125 MG: 125 CAPSULE ORAL at 10:53

## 2024-05-29 RX ADMIN — CARVEDILOL 12.5 MG: 12.5 TABLET, FILM COATED ORAL at 10:52

## 2024-05-29 RX ADMIN — SERTRALINE HYDROCHLORIDE 100 MG: 100 TABLET ORAL at 10:55

## 2024-05-29 RX ADMIN — POTASSIUM CHLORIDE 10 MEQ: 750 TABLET, FILM COATED, EXTENDED RELEASE ORAL at 10:55

## 2024-05-29 RX ADMIN — LISINOPRIL 30 MG: 20 TABLET ORAL at 10:52

## 2024-05-29 ASSESSMENT — ACTIVITIES OF DAILY LIVING (ADL)
ADLS_ACUITY_SCORE: 60

## 2024-05-29 NOTE — PROGRESS NOTES
[facility]    Hospitalist Progress Note      Assessment & Plan   Daya Ignacio is a 89 year old woman who was admitted on 5/25/2024. PMH significant for HTN, NSTEMI, MDD, anxiety, mild cognitive impairment, anemia, chronic back pain from compression fractures, UTI, GI bleed, nephrolithiasis, C. difficile, and falls with previous fractures including left tib/fib, pubic rami, and hip. She presented from [facility] where she was rehabbing after 5/3/24 ORIF for acute comminuted distal left tib/fib fracture. She presented with 2 days of vomiting and diarrhea. Leukocytosis noted in the ER with WBC 28k. CT chest abdomen pelvis revealed colitis. There was concern for recurrent C diff colitis and patient was admitted for further evaluation and treatment.     Leukocytosis  Colitis: Suspicious for C. difficile colitis  History of C diff colitis  Vomiting and diarrhea  -Past history of C. difficile colitis  - Recent hospitalization and antibiotics for UTI (see below for ongoing management of urine culture)  - Enteric panel negative.  - C diff with positive PCR, toxin negative, though given high clinical suspicion, likely C diff recurrence.   - Appreciate GI consultation and recommendations. Continue 14 day treatment followed by vancomycin 125 mg BID x 4 weeks (or until [facility] follow up). [facility] follow up to determine if PO fecal microbiota transplant (Vowst) an option for her.   - WBC improved drastically on PO vanc from 28.8 -> 21.6 -> 8.9.  - Stopped IV fluids 5/27 and continue to encourage PO intake.   - Monitor electrolytes  - Symptomatic treatment of nausea and vomiting  - Contact precautions     Acute on chronic anemia, macrocytic  GERD  History of GI bleed  Hgb since admission has decreased from 10.8 -> 9.4 -> 7.2-->7. No overt evidence of bleeding.  Patient appears pale.  Received 1 unit PRBC.  Hemoglobin now stable  Patient  on aspirin 325 mg daily for DVT prophylaxis for 6 weeks  following ORIF through 6/14/24.   Hold aspirin for now  Continue IV pantoprazole 40 mg twice daily  Monitor hemoglobin  Ordered further workup for her macrocytic anemia including reticulocyte count, peripheral smear, TSH.     Subacute respiratory failure with hypoxia--- improving  Subacute cough  Patient has had ongoing cough and minimal O2 requirement since recent admission.  5/26/24 CT chest with contrast with mild bronchial wall thickening. No pulmonary infiltrate.  Will continue patient's Mucinex at least initially.   Wean O2 as able.   Resume PTA Lasix / K.     Chronic back pain  History of vertebral compression fractures T11-L3  Status post 5/3 distal left tib/fib ORIF   Patient is status post 5/3/24 ORIF to acute comminuted distal left tib/fib fracture.  Patient is to be wearing boot when out of bed, NWB left leg until at least next ortho follow up 6/25. Boot should be brought from facility if possible.   Aspirin 325 mg daily for DVT prophylaxis for 6 weeks following ORIF through 6/14/24 if possible. Currently on hold due to down trending hb.   Continue vitamin D supplementation.  Continue scheduled Tylenol.  PRN methocarbamol 500 mg TID.   5/23, rehab changed from PRN oxycodone to PRN hydromorphone 2 mg. They also noted that patient did not have insight to request PRN medications and scheduled 1 dose hydromorphone in the morning. However, here, patient has frequently declined pain and will not schedule. Pain actually seems reasonably controlled without opioids at this time. Would monitor and continue to discuss pain with nursing and assess patient's complaints and ability to participate with therapy. If needed, would add PRN hydromorphone, though will not order at this time.     Abnormal urine culture  History of UTIs and nephrolithiasis  Patient with > 100 GNR in urine culture. However, patient has not had a negative urine culture going back to 5/2021.  She has history of recurrent UTIs as far back as 2014.   Patient without urinary symptoms.    5/26/24 CT abdomen notes 6 mm nonobstructing left renal stone; otherwise unremarkable. No stranding or signs suggestive of inflammation.   Suspect colonization versus contamination. Will not treat at this time unless there is a clinical change or hemodynamic instability.  Discussed this with son Gael who is in agreement with the plan    HTN  PTA on carvedilol 12.5 mg twice daily, lisinopril 30 mg daily, Isordil 10 mg 3 times daily, amlodipine 10 mg daily, and Lasix 20 mg daily.  Continuing PTA regimen.   Monitor BP.     Mild cognitive impairment  Has some mild issues with memory per family.  Per son Gael,  patient acts hostile when she is unwell.  Today she appears to be in better mood.  -Came to emergency room from TCU  -Fall precautions     MDD  Anxiety  - Continue PTA sertraline 100 mg daily.    DVT Prophylaxis: Pneumatic Compression Devices   Code Status: No CPR- Do NOT Intubate  Expected discharge: Anticipate that she will return to TCU once medically ready. SW consulted. PT to consulted while here.     Hazel Rose MD   Hospitalist Service  [facility]      Interval History   No acute events overnight.  Patient appeared to be in better mood today.  She complained of chronic back pain.  She refused to get out of bed or sit in the recliner.  She denied any abdominal pain.  She is not sure if her bowel movements are getting better or worse.  She thinks she is eating well.    4 point review of system is negative except mentioned above    -Data reviewed today: I reviewed all new labs and imaging results over the last 24 hours.       Physical Exam   Temp: 97.1  F (36.2  C) Temp src: Temporal BP: (!) 165/70 Pulse: 71   Resp: 16 SpO2: 97 % O2 Device: Nasal cannula Oxygen Delivery: 1/2 LPM  Vitals:    05/26/24 0303 05/26/24 0634   Weight: 53.4 kg (117 lb 11.6 oz) 53.7 kg (118 lb 6.2 oz)     Vital Signs with Ranges  Temp:  [97.1  F (36.2  C)-98.6  F (37  C)] 97.1  F  (36.2  C)  Pulse:  [61-94] 71  Resp:  [16-18] 16  BP: (141-165)/(47-70) 165/70  SpO2:  [95 %-98 %] 97 %  I/O last 3 completed shifts:  In: 770 [P.O.:170]  Out: 1350 [Urine:1350]    Constitutional: Sleeping but easily awakened to voice  HEENT: NCAT.   Respiratory: Clear to auscultation bilaterally. No crackles or wheezes. Cardiovascular: Regular rate and rhythm. No murmur.  GI: Soft, nontender, nondistended. Hyperactive bowel sounds.   Musculoskeletal: No pitting edema  Neurologic: Oriented to self and place, moving all 4 extremities  Medications   Current Facility-Administered Medications   Medication Dose Route Frequency Provider Last Rate Last Admin     Current Facility-Administered Medications   Medication Dose Route Frequency Provider Last Rate Last Admin    acetaminophen (TYLENOL) tablet 975 mg  975 mg Oral TID Arina Carpenter MD   975 mg at 05/29/24 1056    amLODIPine (NORVASC) tablet 10 mg  10 mg Oral Daily Fany Dos Santos MD   10 mg at 05/29/24 1052    [Held by provider] aspirin (ASA) EC tablet 325 mg  325 mg Oral Daily Fany Dos Santos MD   325 mg at 05/27/24 0836    carvedilol (COREG) tablet 12.5 mg  12.5 mg Oral BID w/meals Arina Carpenter MD   12.5 mg at 05/29/24 1052    cloNIDine (CATAPRES) tablet 0.1 mg  0.1 mg Oral At Bedtime Arina Carpenter MD   0.1 mg at 05/28/24 2230    furosemide (LASIX) tablet 20 mg  20 mg Oral Daily Arina Carpenter MD   20 mg at 05/29/24 1052    guaiFENesin (MUCINEX) 12 hr tablet 600 mg  600 mg Oral BID Arina Carpenter MD   600 mg at 05/29/24 1055    isosorbide dinitrate (ISORDIL) tablet 10 mg  10 mg Oral TID Fany Dos Santos MD   10 mg at 05/29/24 1052    lisinopril (ZESTRIL) tablet 30 mg  30 mg Oral Daily Fany Dos Santos MD   30 mg at 05/29/24 1052    magnesium sulfate 4 g in 100 mL sterile water intermittent infusion  4 g Intravenous Once Hazel Rose MD 50 mL/hr at 05/29/24 1234 4 g at 05/29/24 1234    pantoprazole  (PROTONIX) IV push injection 40 mg  40 mg Intravenous Q12H Matthew Lozoya MD   40 mg at 05/29/24 1234    potassium chloride ER (K-TAB/KLOR-CON) CR tablet 10 mEq  10 mEq Oral Daily Arina Carpenter MD   10 mEq at 05/29/24 1055    sertraline (ZOLOFT) tablet 100 mg  100 mg Oral Daily Fany Dos Santos MD   100 mg at 05/29/24 1055    sodium chloride (PF) 0.9% PF flush 3 mL  3 mL Intracatheter Q8H Fany Dos Santos MD   3 mL at 05/29/24 1234    vancomycin (VANCOCIN) capsule 125 mg  125 mg Oral 4x Daily Fany Dos Santos MD   125 mg at 05/29/24 1053       Data   Recent Labs   Lab 05/29/24  0736 05/28/24  0635 05/27/24  0658 05/26/24  1230 05/26/24  0012   WBC 6.9  6.9 6.5 8.9 21.6* 28.8*   HGB 8.9*  9.0* 7.0* 7.2* 9.4* 10.8*     100 103* 104* 103* 101*     208 188 205 296 421   NA  --  136 136 134* 134*   POTASSIUM 4.3 4.1 4.1 4.8 5.1   CHLORIDE  --  106 107 104 99   CO2  --  20* 18* 19* 20*   BUN  --  13.5 30.8* 35.0* 41.1*   CR  --  0.44* 0.56 0.65 0.80   ANIONGAP  --  10 11 11 15   DAVE  --  9.3 8.8 9.1 10.4*   GLC  --  85 88 121* 143*   ALBUMIN  --  3.2*  --   --  3.8   PROTTOTAL  --  6.0*  --   --  7.5   BILITOTAL  --  0.6  --   --  0.8   ALKPHOS  --  179*  --   --  289*   ALT  --  5  --   --  5   AST  --  10  --   --  16   LIPASE  --   --   --   --  26       No results found for this or any previous visit (from the past 24 hour(s)).

## 2024-05-29 NOTE — PROGRESS NOTES
Care Management Discharge Note    Discharge Date: 05/29/2024       Discharge Disposition: Transitional Care    Discharge Services: None    Discharge DME:      Discharge Transportation: agency    Private pay costs discussed: transportation costs    Does the patient's insurance plan have a 3 day qualifying hospital stay waiver?  Yes     Which insurance plan 3 day waiver is available? Alternative insurance waiver    Will the waiver be used for post-acute placement? Yes    PAS Confirmation Code:  n/a  Patient/family educated on Medicare website which has current facility and service quality ratings: yes    Education Provided on the Discharge Plan:    Persons Notified of Discharge Plans: TCU  Patient/Family in Agreement with the Plan: yes    Handoff Referral Completed: No    Additional Information:  Patient likely able to discharge tomorrow. W/C ride set for tomorrow from [phone].     PRISCILA Maria, [facility]  [facility]   Please contact the SW on the floor in which the patient is staying for any questions or concerns

## 2024-05-29 NOTE — PROGRESS NOTES
"GASTROENTEROLOGY PROGRESS NOTE        SUBJECTIVE:  No interest in participating with interview. Denies abdominal pain.      Per I&O flowsheet, 2 BMs today.     OBJECTIVE:    BP (!) 165/70 (BP Location: Right arm)   Pulse 71   Temp 97.1  F (36.2  C) (Temporal)   Resp 16   Ht 1.626 m (5' 4\")   Wt 53.7 kg (118 lb 6.2 oz)   SpO2 97%   BMI 20.32 kg/m    Temp (24hrs), Av.9  F (36.6  C), Min:97.1  F (36.2  C), Max:98.6  F (37  C)    No data found.    Intake/Output Summary (Last 24 hours) at 2024 1235  Last data filed at 2024 0500  Gross per 24 hour   Intake 650 ml   Output 1150 ml   Net -500 ml        PHYSICAL EXAM     Constitutional: resting, no distress    Abdomen: soft, NTTP         Additional Comments:  ROS, FH, SH: See initial GI consult for details.     I have reviewed the patient's new clinical lab results:     Recent Labs   Lab Test 24  0736 24  0635 24  0658 24  0716 24  0717 20  0454 20  0928 19  0716 19  0850   WBC 6.9  6.9 6.5 8.9   < > 8.6   < > 3.5*   < > 29.2*   HGB 8.9*  9.0* 7.0* 7.2*   < > 9.1*   < > 13.5   < > 9.0*     100 103* 104*   < > 103*   < > 99   < > 103*     208 188 205   < > 194   < > 168   < > 181   INR  --   --   --   --  1.12  --  1.13  --  1.18*    < > = values in this interval not displayed.     Recent Labs   Lab Test 24  0736 24  0635 24  0658 24  1230   POTASSIUM 4.3 4.1 4.1 4.8   CHLORIDE  --  106 107 104   CO2  --  20* 18* 19*   BUN  --  13.5 30.8* 35.0*   ANIONGAP  --  10 11 11     Recent Labs   Lab Test 24  0635 24  0457 24  0012 24  1636 23  1748 23  1810 23  0644 20  1035 20  0928 19  1703 19  1531   ALBUMIN 3.2*  --  3.8  --   --   --  4.0   < > 3.7   < > 3.5   BILITOTAL 0.6  --  0.8  --   --   --  1.1   < > 1.3   < > 0.6   ALT 5  --  5  --   --   --  8*   < > 12   < > 17   AST 10  --  16  --   --   " --  14   < > 15   < > 24   PROTEIN  --  Negative  --  30* Negative   < >  --    < >  --    < >  --    LIPASE  --   --  26  --   --   --   --   --  42*  --  41*    < > = values in this interval not displayed.       ASSESSMENT/ PLAN  Daya Ignacio is a 89 y-o female with a PMH of recent left leg fracture s/p ORIF, discharged to TCU on 5/8, now presenting with vomiting and diarrhea for a few days.  She has history of C-diff colitis. C-diff PCR positive. On oral vanco 125 mg four times daily. CT showed esophagitis and colitis.    1.    C.difficile colitis: CT w wall thickening from mid transverse colon to rectum ( moderate colitis), C.difficile positive ( Continue oral vanco 125 mg every 6 hours for 2 weeks.   -- Follow-up at [facility] for PO FMT- Stay on vanco 125 mg bid until seen in [facility]    2. Esophagitis: evidenced on CT, PPI BID, discuss further work up as outpatient     Total time: 30 minutes of total time was spent providing patient care, including patient evaluation, reviewing documentation/test results, and .     Discussed with Dr. Hemphill. Will no longer follow. Please call with questions or change in condition.     Lawanda Meyer PA-C  [facility] ( [facility])

## 2024-05-29 NOTE — PLAN OF CARE
"Goal Outcome Evaluation:      Plan of Care Reviewed With: patient    Overall Patient Progress: improvingOverall Patient Progress: improving    Outcome Evaluation: Agitated and withdrawn refuses most of the cares and turn and reposition, took medications and talking her into. allowed us to change her this morning and had a loose liquid stool . Poor appetite , LS coarse crackles and congested . oxygen weaned to 1/2 L. On vanco oral. Pulled out IV access and could not allow any interventions of inserting a new one.      Problem: Adult Inpatient Plan of Care  Goal: Plan of Care Review  Description: The Plan of Care Review/Shift note should be completed every shift.  The Outcome Evaluation is a brief statement about your assessment that the patient is improving, declining, or no change.  This information will be displayed automatically on your shift  note.  Outcome: Progressing  Flowsheets (Taken 5/29/2024 0517)  Outcome Evaluation: Agitated and withdrawn refuses most of the cares and turn and reposition, took medications and talking her into. allowed us to change her this morning and had a loose liquid stool . Poor appetite , LS coarse crackles and congested . oxygen weaned to 1/2 L.  Plan of Care Reviewed With: patient  Overall Patient Progress: improving  Goal: Patient-Specific Goal (Individualized)  Description: You can add care plan individualizations to a care plan. Examples of Individualization might be:  \"Parent requests to be called daily at 9am for status\", \"I have a hard time hearing out of my right ear\", or \"Do not touch me to wake me up as it startles  me\".  Outcome: Progressing  Goal: Absence of Hospital-Acquired Illness or Injury  Outcome: Progressing  Intervention: Identify and Manage Fall Risk  Recent Flowsheet Documentation  Taken 5/28/2024 2015 by Michelle Tiwari, RN  Safety Promotion/Fall Prevention:   activity supervised   safety round/check completed   assistive device/personal items within " reach   clutter free environment maintained   increased rounding and observation   increase visualization of patient   patient and family education   nonskid shoes/slippers when out of bed  Intervention: Prevent Skin Injury  Recent Flowsheet Documentation  Taken 5/28/2024 2015 by Michelle Tiwari, RN  Body Position:   weight shifting   supine, head elevated   supine, legs elevated  Intervention: Prevent and Manage VTE (Venous Thromboembolism) Risk  Recent Flowsheet Documentation  Taken 5/28/2024 2015 by Michelle Tiwari RN  VTE Prevention/Management: SCDs (sequential compression devices) on  Intervention: Prevent Infection  Recent Flowsheet Documentation  Taken 5/28/2024 2015 by Michelle Tiwari, RN  Infection Prevention:   single patient room provided   personal protective equipment utilized   cohorting utilized  Goal: Optimal Comfort and Wellbeing  Outcome: Progressing  Goal: Readiness for Transition of Care  Outcome: Progressing     Problem: Gastroenteritis  Goal: Effective Diarrhea Management  Outcome: Progressing  Goal: Fluid and Electrolyte Balance  Outcome: Progressing  Goal: Nausea and Vomiting Relief  Outcome: Progressing     Problem: Comorbidity Management  Goal: Maintenance of Behavioral Health Symptom Control  Outcome: Progressing  Intervention: Maintain Behavioral Health Symptom Control  Recent Flowsheet Documentation  Taken 5/28/2024 2015 by Michelle Tiwari RN  Medication Review/Management:   medications reviewed   high-risk medications identified  Goal: Blood Pressure in Desired Range  Outcome: Progressing  Intervention: Maintain Blood Pressure Management  Recent Flowsheet Documentation  Taken 5/28/2024 2015 by Michelle Tiwari RN  Medication Review/Management:   medications reviewed   high-risk medications identified

## 2024-05-30 LAB
MAGNESIUM SERPL-MCNC: 1.9 MG/DL (ref 1.7–2.3)
PATH REPORT.COMMENTS IMP SPEC: NORMAL
PATH REPORT.FINAL DX SPEC: NORMAL
PATH REPORT.MICROSCOPIC SPEC OTHER STN: NORMAL
PATH REPORT.MICROSCOPIC SPEC OTHER STN: NORMAL
PATH REPORT.RELEVANT HX SPEC: NORMAL
PHOSPHATE SERPL-MCNC: 2.8 MG/DL (ref 2.5–4.5)
POTASSIUM SERPL-SCNC: 3.8 MMOL/L (ref 3.4–5.3)

## 2024-05-30 PROCEDURE — 120N000001 HC R&B MED SURG/OB

## 2024-05-30 PROCEDURE — 999N000147 HC STATISTIC PT IP EVAL DEFER

## 2024-05-30 PROCEDURE — C9113 INJ PANTOPRAZOLE SODIUM, VIA: HCPCS | Performed by: INTERNAL MEDICINE

## 2024-05-30 PROCEDURE — 250N000011 HC RX IP 250 OP 636: Performed by: INTERNAL MEDICINE

## 2024-05-30 PROCEDURE — 83735 ASSAY OF MAGNESIUM: CPT | Performed by: STUDENT IN AN ORGANIZED HEALTH CARE EDUCATION/TRAINING PROGRAM

## 2024-05-30 PROCEDURE — 36415 COLL VENOUS BLD VENIPUNCTURE: CPT | Performed by: STUDENT IN AN ORGANIZED HEALTH CARE EDUCATION/TRAINING PROGRAM

## 2024-05-30 PROCEDURE — 84100 ASSAY OF PHOSPHORUS: CPT | Performed by: STUDENT IN AN ORGANIZED HEALTH CARE EDUCATION/TRAINING PROGRAM

## 2024-05-30 PROCEDURE — 250N000013 HC RX MED GY IP 250 OP 250 PS 637: Performed by: INTERNAL MEDICINE

## 2024-05-30 PROCEDURE — 84132 ASSAY OF SERUM POTASSIUM: CPT | Performed by: STUDENT IN AN ORGANIZED HEALTH CARE EDUCATION/TRAINING PROGRAM

## 2024-05-30 PROCEDURE — 99233 SBSQ HOSP IP/OBS HIGH 50: CPT | Performed by: STUDENT IN AN ORGANIZED HEALTH CARE EDUCATION/TRAINING PROGRAM

## 2024-05-30 PROCEDURE — 250N000013 HC RX MED GY IP 250 OP 250 PS 637: Performed by: STUDENT IN AN ORGANIZED HEALTH CARE EDUCATION/TRAINING PROGRAM

## 2024-05-30 RX ORDER — PANTOPRAZOLE SODIUM 40 MG/1
40 TABLET, DELAYED RELEASE ORAL
Status: DISCONTINUED | OUTPATIENT
Start: 2024-05-30 | End: 2024-05-31 | Stop reason: HOSPADM

## 2024-05-30 RX ORDER — HYDROMORPHONE HYDROCHLORIDE 2 MG/1
2 TABLET ORAL EVERY 6 HOURS PRN
Qty: 10 TABLET | Refills: 0 | Status: SHIPPED | OUTPATIENT
Start: 2024-05-30 | End: 2024-06-07

## 2024-05-30 RX ORDER — PANTOPRAZOLE SODIUM 40 MG/1
40 TABLET, DELAYED RELEASE ORAL DAILY
DISCHARGE
Start: 2024-05-30 | End: 2024-06-04

## 2024-05-30 RX ORDER — VANCOMYCIN HYDROCHLORIDE 125 MG/1
CAPSULE ORAL
DISCHARGE
Start: 2024-05-30 | End: 2024-07-08 | Stop reason: DRUGHIGH

## 2024-05-30 RX ADMIN — VANCOMYCIN HYDROCHLORIDE 125 MG: 125 CAPSULE ORAL at 15:52

## 2024-05-30 RX ADMIN — AMLODIPINE BESYLATE 10 MG: 10 TABLET ORAL at 09:53

## 2024-05-30 RX ADMIN — ONDANSETRON 4 MG: 4 TABLET, ORALLY DISINTEGRATING ORAL at 11:49

## 2024-05-30 RX ADMIN — GUAIFENESIN 600 MG: 600 TABLET, EXTENDED RELEASE ORAL at 09:53

## 2024-05-30 RX ADMIN — ISOSORBIDE DINITRATE 10 MG: 10 TABLET ORAL at 13:36

## 2024-05-30 RX ADMIN — FUROSEMIDE 20 MG: 20 TABLET ORAL at 09:53

## 2024-05-30 RX ADMIN — METHOCARBAMOL 500 MG: 500 TABLET ORAL at 11:45

## 2024-05-30 RX ADMIN — GUAIFENESIN 600 MG: 600 TABLET, EXTENDED RELEASE ORAL at 21:47

## 2024-05-30 RX ADMIN — ISOSORBIDE DINITRATE 10 MG: 10 TABLET ORAL at 09:54

## 2024-05-30 RX ADMIN — CARVEDILOL 12.5 MG: 12.5 TABLET, FILM COATED ORAL at 09:53

## 2024-05-30 RX ADMIN — POTASSIUM CHLORIDE 10 MEQ: 750 TABLET, FILM COATED, EXTENDED RELEASE ORAL at 09:54

## 2024-05-30 RX ADMIN — ACETAMINOPHEN 975 MG: 325 TABLET, FILM COATED ORAL at 21:48

## 2024-05-30 RX ADMIN — VANCOMYCIN HYDROCHLORIDE 125 MG: 125 CAPSULE ORAL at 21:48

## 2024-05-30 RX ADMIN — VANCOMYCIN HYDROCHLORIDE 125 MG: 125 CAPSULE ORAL at 13:36

## 2024-05-30 RX ADMIN — LISINOPRIL 30 MG: 20 TABLET ORAL at 09:53

## 2024-05-30 RX ADMIN — ONDANSETRON 4 MG: 4 TABLET, ORALLY DISINTEGRATING ORAL at 17:53

## 2024-05-30 RX ADMIN — CARVEDILOL 12.5 MG: 12.5 TABLET, FILM COATED ORAL at 17:47

## 2024-05-30 RX ADMIN — METHOCARBAMOL 500 MG: 500 TABLET ORAL at 17:47

## 2024-05-30 RX ADMIN — ACETAMINOPHEN 975 MG: 325 TABLET, FILM COATED ORAL at 09:53

## 2024-05-30 RX ADMIN — PANTOPRAZOLE SODIUM 40 MG: 40 TABLET, DELAYED RELEASE ORAL at 15:52

## 2024-05-30 RX ADMIN — CLONIDINE HYDROCHLORIDE 0.1 MG: 0.1 TABLET ORAL at 21:48

## 2024-05-30 RX ADMIN — PANTOPRAZOLE SODIUM 40 MG: 40 INJECTION, POWDER, FOR SOLUTION INTRAVENOUS at 09:26

## 2024-05-30 RX ADMIN — ISOSORBIDE DINITRATE 10 MG: 10 TABLET ORAL at 17:47

## 2024-05-30 RX ADMIN — VANCOMYCIN HYDROCHLORIDE 125 MG: 125 CAPSULE ORAL at 09:53

## 2024-05-30 RX ADMIN — SERTRALINE HYDROCHLORIDE 100 MG: 100 TABLET ORAL at 09:52

## 2024-05-30 RX ADMIN — ACETAMINOPHEN 975 MG: 325 TABLET, FILM COATED ORAL at 13:36

## 2024-05-30 ASSESSMENT — ACTIVITIES OF DAILY LIVING (ADL)
ADLS_ACUITY_SCORE: 58
ADLS_ACUITY_SCORE: 60
ADLS_ACUITY_SCORE: 58
ADLS_ACUITY_SCORE: 60
ADLS_ACUITY_SCORE: 58
ADLS_ACUITY_SCORE: 60
ADLS_ACUITY_SCORE: 60
ADLS_ACUITY_SCORE: 58
ADLS_ACUITY_SCORE: 58
ADLS_ACUITY_SCORE: 60
ADLS_ACUITY_SCORE: 58
ADLS_ACUITY_SCORE: 58
ADLS_ACUITY_SCORE: 54
ADLS_ACUITY_SCORE: 58
ADLS_ACUITY_SCORE: 58
ADLS_ACUITY_SCORE: 60

## 2024-05-30 NOTE — PLAN OF CARE
"Pt disorientated to time. Weaned to room air. PO robaxin and scheduled tylenol given for pain. CMS intact. Steri strips to LLE intact. Turned and repositioned w/ Ax2-refused getting OOB this shift. Voiding incontinently. Tolerating low fat/Na diet. PO zofran given for nausea w/ improvement. Plan is to discharge to TCU tomorrow.     Problem: Adult Inpatient Plan of Care  Goal: Plan of Care Review  Description: The Plan of Care Review/Shift note should be completed every shift.  The Outcome Evaluation is a brief statement about your assessment that the patient is improving, declining, or no change.  This information will be displayed automatically on your shift  note.  Outcome: Progressing  Flowsheets (Taken 5/30/2024 1344)  Plan of Care Reviewed With: patient  Overall Patient Progress: improving  Goal: Patient-Specific Goal (Individualized)  Description: You can add care plan individualizations to a care plan. Examples of Individualization might be:  \"Parent requests to be called daily at 9am for status\", \"I have a hard time hearing out of my right ear\", or \"Do not touch me to wake me up as it startles  me\".  Outcome: Progressing  Goal: Absence of Hospital-Acquired Illness or Injury  Outcome: Progressing  Intervention: Identify and Manage Fall Risk  Recent Flowsheet Documentation  Taken 5/30/2024 0925 by Nora Wilson RN  Safety Promotion/Fall Prevention:   activity supervised   assistive device/personal items within reach   nonskid shoes/slippers when out of bed   safety round/check completed  Intervention: Prevent Skin Injury  Recent Flowsheet Documentation  Taken 5/30/2024 1338 by Nora Wilson, RN  Body Position: weight shifting  Taken 5/30/2024 0925 by Nora Wilson, RN  Device Skin Pressure Protection: positioning supports utilized  Intervention: Prevent and Manage VTE (Venous Thromboembolism) Risk  Recent Flowsheet Documentation  Taken 5/30/2024 0925 by Nora Wilson RN  VTE " Prevention/Management: SCDs (sequential compression devices) off  Intervention: Prevent Infection  Recent Flowsheet Documentation  Taken 5/30/2024 0925 by Nora Wilson RN  Infection Prevention: rest/sleep promoted  Goal: Optimal Comfort and Wellbeing  Outcome: Progressing  Intervention: Monitor Pain and Promote Comfort  Recent Flowsheet Documentation  Taken 5/30/2024 0953 by Nora Wilson RN  Pain Management Interventions:   medication (see MAR)   heat applied  Goal: Readiness for Transition of Care  Outcome: Progressing     Problem: Gastroenteritis  Goal: Effective Diarrhea Management  Outcome: Progressing  Intervention: Manage Diarrhea  Recent Flowsheet Documentation  Taken 5/30/2024 1338 by Nora Wilson RN  Perineal Care: absorbent brief/pad changed  Goal: Fluid and Electrolyte Balance  Outcome: Progressing  Intervention: Monitor and Manage Fluid and Electrolyte Balance  Recent Flowsheet Documentation  Taken 5/30/2024 0925 by Nora Wilson RN  Fluid/Electrolyte Management: fluids provided     Problem: Comorbidity Management  Goal: Maintenance of Behavioral Health Symptom Control  Outcome: Progressing  Intervention: Maintain Behavioral Health Symptom Control  Recent Flowsheet Documentation  Taken 5/30/2024 0925 by Nora Wilson RN  Medication Review/Management: medications reviewed     Problem: Skin Injury Risk Increased  Goal: Skin Health and Integrity  Outcome: Progressing  Intervention: Plan: Nurse Driven Intervention: Moisture Management  Recent Flowsheet Documentation  Taken 5/30/2024 1338 by Nora Wilson RN  Bathing/Skin Care: incontinence care   Goal Outcome Evaluation:      Plan of Care Reviewed With: patient    Overall Patient Progress: improvingOverall Patient Progress: improving

## 2024-05-30 NOTE — PLAN OF CARE
"Goal Outcome Evaluation:    Plan of Care Review With: patient    Overall Patient Progress: improving    Outcome Evaluation: Up with A2 via lift. Pleasant and cooperative tonight. Diet continued. O2 at 0.5LPM via NC. NWB of LLE maintained. Precautions in place. Plan for discharge to TCU today.    Problem: Adult Inpatient Plan of Care  Goal: Plan of Care Review  Description: The Plan of Care Review/Shift note should be completed every shift.  The Outcome Evaluation is a brief statement about your assessment that the patient is improving, declining, or no change.  This information will be displayed automatically on your shift  note.  Outcome: Progressing  Flowsheets (Taken 5/30/2024 0120)  Outcome Evaluation: Up with A2 via lift. Pleasant and cooperative tonight. Diet continued. O2 at 0.5LPM via NC. NWB of LLE maintained. Precautions in place. Plan for discharge to TCU today.  Overall Patient Progress: improving  Goal: Patient-Specific Goal (Individualized)  Description: You can add care plan individualizations to a care plan. Examples of Individualization might be:  \"Parent requests to be called daily at 9am for status\", \"I have a hard time hearing out of my right ear\", or \"Do not touch me to wake me up as it startles  me\".  Outcome: Progressing  Goal: Absence of Hospital-Acquired Illness or Injury  Outcome: Progressing  Intervention: Identify and Manage Fall Risk  Recent Flowsheet Documentation  Taken 5/30/2024 0106 by Hugh Gordillo RN  Safety Promotion/Fall Prevention: safety round/check completed  Intervention: Prevent Skin Injury  Recent Flowsheet Documentation  Taken 5/30/2024 0106 by Hugh Gordillo RN  Body Position:   right   side-lying  Skin Protection: adhesive use limited  Device Skin Pressure Protection:   adhesive use limited   tubing/devices free from skin contact  Intervention: Prevent and Manage VTE (Venous Thromboembolism) Risk  Recent Flowsheet Documentation  Taken 5/30/2024 0106 by Rand" Hugh CHANG RN  VTE Prevention/Management: SCDs (sequential compression devices) on  Intervention: Prevent Infection  Recent Flowsheet Documentation  Taken 5/30/2024 0106 by Hugh Gordillo RN  Infection Prevention:   single patient room provided   personal protective equipment utilized   cohorting utilized  Goal: Optimal Comfort and Wellbeing  Outcome: Progressing  Goal: Readiness for Transition of Care  Outcome: Progressing

## 2024-05-30 NOTE — PLAN OF CARE
Still having episodes of large loose stools, declining cares at times, declined blood draw for mg recheck, did not take morning medications till 1045, with repeated re-approaches and education. Poor appetite, saying food does not teste as she wants it to. Will keep monitoring.   Problem: Gastroenteritis  Goal: Nausea and Vomiting Relief  Outcome: Met     Problem: Comorbidity Management  Goal: Blood Pressure in Desired Range  Outcome: Met  Intervention: Maintain Blood Pressure Management  Recent Flowsheet Documentation  Taken 5/29/2024 1100 by Silvio Au, RN  Medication Review/Management: medications reviewed   Goal Outcome Evaluation:      Plan of Care Reviewed With: patient

## 2024-05-30 NOTE — PROGRESS NOTES
PT: Orders received. Chart reviewed and discussed with , RN, and patient.  PT not indicated due to patient admitted from TCU, has bed hold, and plan to return 5/31.  Defer discharge recommendations to medical team.  Will complete orders.

## 2024-05-30 NOTE — PROGRESS NOTES
Care Management Discharge Note    Discharge Date:5/31/24       Discharge Disposition: Transitional Care    Discharge Services: None    Discharge DME:      Discharge Transportation: agency    Private pay costs discussed: transportation costs    Does the patient's insurance plan have a 3 day qualifying hospital stay waiver?  Yes     Which insurance plan 3 day waiver is available? [employer]    Will the waiver be used for post-acute placement? Yes    PAS Confirmation Code:  N/a  Patient/family educated on Medicare website which has current facility and service quality ratings: yes      Persons Notified of Discharge Plans: Bin CLEMENTE, TCNAOMI  Patient/Family in Agreement with the Plan: yes    Handoff Referral Completed: No    Additional Information:  Patient likely able to discharge tomorrow. W/C ride set for tomorrow 5/31 from [id number].     Updated TCU and bin [facility].       PRISCILA Maria, [facility]  Emergency Room   Please contact the SW on the floor in which the patient is staying for any questions or concerns

## 2024-05-30 NOTE — PROGRESS NOTES
[facility]    Medicine Progress Note - Hospitalist Service    Date of Admission:  5/25/2024    Assessment & Plan     89-year-old female with past medical history of hypertension, NSTEMI, depression, anxiety, mild cognitive impairment, compression fractures with chronic back pain, UTI, GI bleed, nephrolithiasis, C. difficile and falls with previous fractures including the left tibia/fibula, pubic rami and hip.    She presented from [facility] where she has been rehabbing since ORIF of acute left comminuted distal tibia fibular fracture.    She presented with 2 days of vomiting and diarrhea.  WBC count was 28 with CT abdomen showing colitis.  Patient PCR was positive for C. difficile but tested negative for toxin with EIA.  Enteric panel was negative.      Abdominal pain with diarrhea, leukocytosis and CT findings of colitis  Clinical presentation consistent with C. difficile colitis with C. difficile PCR positive even though EIA for the toxin was negative.  GI consult appreciated, vancomycin 125 mg 4 times daily for 2 weeks followed by 125 mg twice daily for 4 weeks.  Patient has mild nausea and abdominal discomfort.  Diarrhea resolved.  Nontender on examination.  Patient not comfortable discharging today and will give her another day.  Likely discharge receipt of other    Acute on chronic anemia.  History of GI bleed.  Hemoglobin on admission 10.8 and decreased to 7.  Given 1 unit of PRBC on 5/28 and hemoglobin improved to 8.9.  Continue Protonix.  Discontinue aspirin which she was getting for DVT prophylaxis.  Workup of microcytic anemia ordered including reticulocyte count, peripheral smear and TSH.    Chronic respiratory failure with hypoxia.  Patient had ongoing cough and minimal oxygen requirement since her previous admission with CT on 5/26 showing mild bronchial wall thickening with no pulmonary infiltrates.  Now weaned off oxygen.    Chronic back pain with history of vertebral  compression fractures T11-L3  Continue pain management with as needed Dilaudid upon discharge.    Left tibia-fibula ORIF on 5/3.  Nonweightbearing on the left leg till next orthopedics appointment on 6/25.  Continue vitamin D supplementation and as needed methocarbamol.  Stopped aspirin for DVT prophylaxis due to anemia.    Abnormal urine culture.  History of UTI and nephrolithiasis.  More than 100,000 gram-negative rods in urine but she has not had a negative culture going back to May 2021.  Does not have any UTI symptoms.    This is likely colonization, no antibiotics.    Hypertension.  PTA regimen of carvedilol, lisinopril, Isordil, amlodipine and Lasix continued    Mild cognitive impairment.      Depression and anxiety.  Continue sertraline.          Diet: Combination Diet Low Saturated Fat Na <2400mg Diet    DVT Prophylaxis: Pneumatic Compression Devices  Almanza Catheter: Not present  Lines: None     Cardiac Monitoring: None  Code Status: No CPR- Do NOT Intubate      Clinically Significant Risk Factors            # Hypomagnesemia: Lowest Mg = 1.5 mg/dL in last 2 days, will replace as needed   # Hypoalbuminemia: Lowest albumin = 3.2 g/dL at 5/28/2024  6:35 AM, will monitor as appropriate     # Hypertension: Noted on problem list            # Financial/Environmental Concerns:           Disposition Plan     Medically Ready for Discharge: Anticipated Tomorrow             Tevin Barlow MD  Hospitalist Service  [facility]  Securely message with [employer] (more info)  Text page via [facility] Paging/Directory   ______________________________________________________________________    Interval History   Has mild abdominal discomfort.  Diarrhea resolved.  Tolerating p.o.    Physical Exam   Vital Signs: Temp: 98.6  F (37  C) Temp src: Temporal BP: (!) 159/67 Pulse: 78   Resp: 16 SpO2: 92 % O2 Device: None (Room air) Oxygen Delivery: 1/2 LPM  Weight: 118 lbs 6.19 oz    General Appearance: Alert, awake and  oriented x 3.  Respiratory: Clear to auscultation.  Cardiovascular: S1-S2 normal  GI: No point tenderness.  Skin: No rash.  Other: No edema.    Medical Decision Making       MANAGEMENT DISCUSSED with the following over the past 24 hours: , RN, case management, patient and son over the phone       Data     I have personally reviewed the following data over the past 24 hrs:    N/A  \   N/A   / N/A     N/A N/A N/A /  N/A   3.8 N/A N/A \       Imaging results reviewed over the past 24 hrs:   No results found for this or any previous visit (from the past 24 hour(s)).

## 2024-05-31 ENCOUNTER — MEDICAL CORRESPONDENCE (OUTPATIENT)
Dept: HEALTH INFORMATION MANAGEMENT | Facility: CLINIC | Age: 89
End: 2024-05-31
Payer: COMMERCIAL

## 2024-05-31 VITALS
HEART RATE: 65 BPM | TEMPERATURE: 96.9 F | OXYGEN SATURATION: 98 % | SYSTOLIC BLOOD PRESSURE: 165 MMHG | WEIGHT: 118.39 LBS | BODY MASS INDEX: 20.21 KG/M2 | DIASTOLIC BLOOD PRESSURE: 71 MMHG | HEIGHT: 64 IN | RESPIRATION RATE: 18 BRPM

## 2024-05-31 LAB
ANION GAP SERPL CALCULATED.3IONS-SCNC: 10 MMOL/L (ref 7–15)
BASOPHILS # BLD AUTO: 0 10E3/UL (ref 0–0.2)
BASOPHILS NFR BLD AUTO: 1 %
BUN SERPL-MCNC: 8.2 MG/DL (ref 8–23)
CALCIUM SERPL-MCNC: 8.5 MG/DL (ref 8.2–9.6)
CHLORIDE SERPL-SCNC: 100 MMOL/L (ref 98–107)
CREAT SERPL-MCNC: 0.39 MG/DL (ref 0.51–0.95)
DEPRECATED HCO3 PLAS-SCNC: 23 MMOL/L (ref 22–29)
EGFRCR SERPLBLD CKD-EPI 2021: >90 ML/MIN/1.73M2
EOSINOPHIL # BLD AUTO: 0.2 10E3/UL (ref 0–0.7)
EOSINOPHIL NFR BLD AUTO: 3 %
ERYTHROCYTE [DISTWIDTH] IN BLOOD BY AUTOMATED COUNT: 16.3 % (ref 10–15)
GLUCOSE SERPL-MCNC: 91 MG/DL (ref 70–99)
HCT VFR BLD AUTO: 26.4 % (ref 35–47)
HGB BLD-MCNC: 8.8 G/DL (ref 11.7–15.7)
IMM GRANULOCYTES # BLD: 0.1 10E3/UL
IMM GRANULOCYTES NFR BLD: 2 %
LYMPHOCYTES # BLD AUTO: 1 10E3/UL (ref 0.8–5.3)
LYMPHOCYTES NFR BLD AUTO: 16 %
MAGNESIUM SERPL-MCNC: 1.8 MG/DL (ref 1.7–2.3)
MCH RBC QN AUTO: 33.1 PG (ref 26.5–33)
MCHC RBC AUTO-ENTMCNC: 33.3 G/DL (ref 31.5–36.5)
MCV RBC AUTO: 99 FL (ref 78–100)
MONOCYTES # BLD AUTO: 0.5 10E3/UL (ref 0–1.3)
MONOCYTES NFR BLD AUTO: 9 %
NEUTROPHILS # BLD AUTO: 4.4 10E3/UL (ref 1.6–8.3)
NEUTROPHILS NFR BLD AUTO: 69 %
NRBC # BLD AUTO: 0 10E3/UL
NRBC BLD AUTO-RTO: 0 /100
PHOSPHATE SERPL-MCNC: 2.8 MG/DL (ref 2.5–4.5)
PLATELET # BLD AUTO: 212 10E3/UL (ref 150–450)
POTASSIUM SERPL-SCNC: 3.8 MMOL/L (ref 3.4–5.3)
RBC # BLD AUTO: 2.66 10E6/UL (ref 3.8–5.2)
SODIUM SERPL-SCNC: 133 MMOL/L (ref 135–145)
WBC # BLD AUTO: 6.2 10E3/UL (ref 4–11)

## 2024-05-31 PROCEDURE — 250N000013 HC RX MED GY IP 250 OP 250 PS 637: Performed by: INTERNAL MEDICINE

## 2024-05-31 PROCEDURE — 85025 COMPLETE CBC W/AUTO DIFF WBC: CPT | Performed by: STUDENT IN AN ORGANIZED HEALTH CARE EDUCATION/TRAINING PROGRAM

## 2024-05-31 PROCEDURE — 99239 HOSP IP/OBS DSCHRG MGMT >30: CPT | Performed by: STUDENT IN AN ORGANIZED HEALTH CARE EDUCATION/TRAINING PROGRAM

## 2024-05-31 PROCEDURE — 84100 ASSAY OF PHOSPHORUS: CPT | Performed by: STUDENT IN AN ORGANIZED HEALTH CARE EDUCATION/TRAINING PROGRAM

## 2024-05-31 PROCEDURE — 250N000013 HC RX MED GY IP 250 OP 250 PS 637: Performed by: STUDENT IN AN ORGANIZED HEALTH CARE EDUCATION/TRAINING PROGRAM

## 2024-05-31 PROCEDURE — 83735 ASSAY OF MAGNESIUM: CPT | Performed by: STUDENT IN AN ORGANIZED HEALTH CARE EDUCATION/TRAINING PROGRAM

## 2024-05-31 PROCEDURE — 80048 BASIC METABOLIC PNL TOTAL CA: CPT | Performed by: STUDENT IN AN ORGANIZED HEALTH CARE EDUCATION/TRAINING PROGRAM

## 2024-05-31 PROCEDURE — 36415 COLL VENOUS BLD VENIPUNCTURE: CPT | Performed by: STUDENT IN AN ORGANIZED HEALTH CARE EDUCATION/TRAINING PROGRAM

## 2024-05-31 RX ADMIN — CARVEDILOL 12.5 MG: 12.5 TABLET, FILM COATED ORAL at 09:40

## 2024-05-31 RX ADMIN — GUAIFENESIN 600 MG: 600 TABLET, EXTENDED RELEASE ORAL at 09:40

## 2024-05-31 RX ADMIN — VANCOMYCIN HYDROCHLORIDE 125 MG: 125 CAPSULE ORAL at 09:40

## 2024-05-31 RX ADMIN — SERTRALINE HYDROCHLORIDE 100 MG: 100 TABLET ORAL at 09:40

## 2024-05-31 RX ADMIN — ISOSORBIDE DINITRATE 10 MG: 10 TABLET ORAL at 12:41

## 2024-05-31 RX ADMIN — PANTOPRAZOLE SODIUM 40 MG: 40 TABLET, DELAYED RELEASE ORAL at 06:33

## 2024-05-31 RX ADMIN — VANCOMYCIN HYDROCHLORIDE 125 MG: 125 CAPSULE ORAL at 12:41

## 2024-05-31 RX ADMIN — POTASSIUM CHLORIDE 10 MEQ: 750 TABLET, FILM COATED, EXTENDED RELEASE ORAL at 09:40

## 2024-05-31 RX ADMIN — AMLODIPINE BESYLATE 10 MG: 10 TABLET ORAL at 09:40

## 2024-05-31 RX ADMIN — ISOSORBIDE DINITRATE 10 MG: 10 TABLET ORAL at 09:40

## 2024-05-31 RX ADMIN — FUROSEMIDE 20 MG: 20 TABLET ORAL at 09:40

## 2024-05-31 RX ADMIN — ACETAMINOPHEN 975 MG: 325 TABLET, FILM COATED ORAL at 09:39

## 2024-05-31 RX ADMIN — LISINOPRIL 30 MG: 20 TABLET ORAL at 09:39

## 2024-05-31 ASSESSMENT — ACTIVITIES OF DAILY LIVING (ADL)
ADLS_ACUITY_SCORE: 54
ADLS_ACUITY_SCORE: 58
ADLS_ACUITY_SCORE: 54
ADLS_ACUITY_SCORE: 58
ADLS_ACUITY_SCORE: 54
ADLS_ACUITY_SCORE: 54

## 2024-05-31 NOTE — PLAN OF CARE
Occupational Therapy: Orders received. Chart reviewed and discussed with care team.? Occupational Therapy not indicated due to chart review and discussion with RN, pt to be returned to TCU today after being admitted from TCU.? Defer discharge recommendations to medical team and next level of care.? Will complete orders.

## 2024-05-31 NOTE — PLAN OF CARE
"Goal Outcome Evaluation:      Plan of Care Reviewed With: patient    Overall Patient Progress: improvingOverall Patient Progress: improving    Outcome Evaluation: Pt A/O x2, VSS. No PIV. Assist x2 with lift. Voiding well, incont. Tolerating a regular diet well. CMS intatc. Dressing CDI. NWB to LLE. RA when awake. Plan is TCU today.    Patient discharged to TCU with wheelchair transport    Problem: Adult Inpatient Plan of Care  Goal: Plan of Care Review  Description: The Plan of Care Review/Shift note should be completed every shift.  The Outcome Evaluation is a brief statement about your assessment that the patient is improving, declining, or no change.  This information will be displayed automatically on your shift  note.  Outcome: Adequate for Care Transition  Flowsheets (Taken 5/31/2024 1343)  Outcome Evaluation: Pt A/O x2, VSS. No PIV. Assist x2 with lift. Voiding well, incont. Tolerating a regular diet well. CMS intatc. Dressing CDI. NWB to LLE. RA when awake. Plan is TCU today.  Plan of Care Reviewed With: patient  Overall Patient Progress: improving  Goal: Patient-Specific Goal (Individualized)  Description: You can add care plan individualizations to a care plan. Examples of Individualization might be:  \"Parent requests to be called daily at 9am for status\", \"I have a hard time hearing out of my right ear\", or \"Do not touch me to wake me up as it startles  me\".  Outcome: Adequate for Care Transition  Goal: Absence of Hospital-Acquired Illness or Injury  Outcome: Adequate for Care Transition  Intervention: Identify and Manage Fall Risk  Recent Flowsheet Documentation  Taken 5/31/2024 0939 by Nora Bennett RN  Safety Promotion/Fall Prevention:   activity supervised   clutter free environment maintained   lighting adjusted   mobility aid in reach   nonskid shoes/slippers when out of bed   safety round/check completed  Intervention: Prevent Skin Injury  Recent Flowsheet Documentation  Taken 5/31/2024 0939 " by Nora Bennett RN  Body Position: supine, head elevated  Intervention: Prevent and Manage VTE (Venous Thromboembolism) Risk  Recent Flowsheet Documentation  Taken 5/31/2024 0939 by Nora Bennett RN  VTE Prevention/Management: SCDs (sequential compression devices) off  Intervention: Prevent Infection  Recent Flowsheet Documentation  Taken 5/31/2024 0939 by Nora Bennett RN  Infection Prevention:   single patient room provided   rest/sleep promoted  Goal: Optimal Comfort and Wellbeing  Outcome: Adequate for Care Transition  Intervention: Monitor Pain and Promote Comfort  Recent Flowsheet Documentation  Taken 5/31/2024 0939 by Nora Bennett RN  Pain Management Interventions: medication (see MAR)  Goal: Readiness for Transition of Care  Outcome: Adequate for Care Transition     Problem: Gastroenteritis  Goal: Effective Diarrhea Management  Outcome: Adequate for Care Transition  Goal: Fluid and Electrolyte Balance  Outcome: Adequate for Care Transition  Intervention: Monitor and Manage Fluid and Electrolyte Balance  Recent Flowsheet Documentation  Taken 5/31/2024 0939 by Nora Bennett RN  Fluid/Electrolyte Management: fluids provided     Problem: Comorbidity Management  Goal: Maintenance of Behavioral Health Symptom Control  Outcome: Adequate for Care Transition  Intervention: Maintain Behavioral Health Symptom Control  Recent Flowsheet Documentation  Taken 5/31/2024 0939 by Nora Bennett RN  Medication Review/Management: medications reviewed     Problem: Skin Injury Risk Increased  Goal: Skin Health and Integrity  Outcome: Adequate for Care Transition  Intervention: Plan: Nurse Driven Intervention: Moisture Management  Recent Flowsheet Documentation  Taken 5/31/2024 0939 by Nora Bennett RN  Moisture Interventions:   Encourage regular toileting   Incontinence pad  Intervention: Plan: Nurse Driven Intervention: Friction and Shear  Recent Flowsheet Documentation  Taken 5/31/2024  0939 by Nora Bennett, RN  Friction/Shear Interventions: HOB 30 degrees or less  Intervention: Optimize Skin Protection  Recent Flowsheet Documentation  Taken 5/31/2024 0939 by Nora Bennett, RN  Activity Management: activity adjusted per tolerance  Head of Bed (HOB) Positioning: HOB at 30-45 degrees  Intervention: Promote and Optimize Oral Intake  Recent Flowsheet Documentation  Taken 5/31/2024 0939 by Nora Bennett, RN  Oral Nutrition Promotion: rest periods promoted

## 2024-05-31 NOTE — PROGRESS NOTES
Care Management Discharge Note    Discharge Date: 05/31/2024       Discharge Disposition: Transitional Care    Discharge Services: None    Discharge DME:      Discharge Transportation: agency    Private pay costs discussed: transportation costs      PAS Confirmation Code: [id number] (return)  Patient/family educated on Medicare website which has current facility and service quality ratings: yes    Education Provided on the Discharge Plan:    Persons Notified of Discharge Plans: patient, son and TCU  Patient/Family in Agreement with the Plan: yes    Additional Information:  [facility]  SW confirmed with son that pt is discharging today and time.  Orders sent to TCU.    PRISCILA Abdi, SHIN  Inpatient Care Coordination  [facility]  [phone]      SHIN ABDI

## 2024-05-31 NOTE — PLAN OF CARE
"Goal Outcome Evaluation:    A&O x4, intermittent confusion   VSS, on 1 LPM NC, hypertension managed with scheduled medications   Pain managed with heat application, robaxin, tylenol positional changes with pillow support   PO vancomycin administered   Abd discomfort managed with Zofran   Contact precautions maintained  Dressing CDI, CMS intact   Poor appetite  Incontinent of B&B, blue chucks in use   K/mag/phos protocol AM recheck   Pt removed IV     Problem: Adult Inpatient Plan of Care  Goal: Plan of Care Review  Description: The Plan of Care Review/Shift note should be completed every shift.  The Outcome Evaluation is a brief statement about your assessment that the patient is improving, declining, or no change.  This information will be displayed automatically on your shift  note.  Outcome: Progressing  Flowsheets (Taken 5/30/2024 2239)  Plan of Care Reviewed With: patient  Overall Patient Progress: improving  Goal: Patient-Specific Goal (Individualized)  Description: You can add care plan individualizations to a care plan. Examples of Individualization might be:  \"Parent requests to be called daily at 9am for status\", \"I have a hard time hearing out of my right ear\", or \"Do not touch me to wake me up as it startles  me\".  Outcome: Progressing  Goal: Absence of Hospital-Acquired Illness or Injury  Outcome: Progressing  Intervention: Identify and Manage Fall Risk  Recent Flowsheet Documentation  Taken 5/30/2024 1545 by Florecita Larios RN  Safety Promotion/Fall Prevention: activity supervised  Intervention: Prevent Skin Injury  Recent Flowsheet Documentation  Taken 5/30/2024 1545 by Florecita Larios, RN  Body Position: weight shifting  Skin Protection: adhesive use limited  Device Skin Pressure Protection: positioning supports utilized  Intervention: Prevent and Manage VTE (Venous Thromboembolism) Risk  Recent Flowsheet Documentation  Taken 5/30/2024 1545 by Florecita Larios, RN  VTE Prevention/Management: " SCDs (sequential compression devices) off  Intervention: Prevent Infection  Recent Flowsheet Documentation  Taken 5/30/2024 1545 by Florecita Larios RN  Infection Prevention: rest/sleep promoted  Goal: Optimal Comfort and Wellbeing  Outcome: Progressing  Intervention: Monitor Pain and Promote Comfort  Recent Flowsheet Documentation  Taken 5/30/2024 1746 by Florecita Larios, RN  Pain Management Interventions:   medication (see MAR)   repositioned   pillow support provided  Taken 5/30/2024 1545 by Florecita Larios RN  Pain Management Interventions:   heat applied   repositioned   pillow support provided  Goal: Readiness for Transition of Care  Outcome: Progressing     Discharge plans TCU tomorrow       Plan of Care Reviewed With: patient    Overall Patient Progress: improvingOverall Patient Progress: improving

## 2024-05-31 NOTE — DISCHARGE SUMMARY
[facility]  Hospitalist Discharge Summary      Date of Admission:  5/25/2024  Date of Discharge:  5/31/2024  Discharging Provider: Tevin Barlow MD  Discharge Service: Hospitalist Service    Discharge Diagnoses     C. difficile colitis  Acute on chronic anemia.  History of GI bleed.  Chronic respiratory failure with hypoxia.  Chronic back pain with history of vertebral compression fracture T11-L3  Left tibia-fibula ORIF on 5/3  Chronically abnormal UA with positive urine cultures  Hypertension  Mild cognitive impairment  Depression and anxiety      Clinically Significant Risk Factors          Follow-ups Needed After Discharge   Follow-up Appointments     Follow Up and recommended labs and tests      Follow up with prison physician.  The following labs/tests are   recommended: CBC and BMP in 1 week.            Discharge Disposition   Discharged to home  Condition at discharge: Stable    Hospital Course   89-year-old female with past medical history of hypertension, NSTEMI, depression, anxiety, mild cognitive impairment, compression fractures with chronic back pain, UTI, GI bleed, nephrolithiasis, C. difficile and falls with previous fractures including the left tibia/fibula, pubic rami and hip.    She presented from [facility] where she has been rehabbing since ORIF of acute left comminuted distal tibia fibular fracture.    She presented with 2 days of vomiting and diarrhea.  WBC count was 28 with CT abdomen showing colitis.  Patient PCR was positive for C. difficile but tested negative for toxin with EIA.  Enteric panel was negative.      Abdominal pain with diarrhea, leukocytosis and CT findings of colitis  Clinical presentation consistent with C. difficile colitis with C. difficile PCR positive even though EIA for the toxin was negative.  GI consult appreciated, vancomycin 125 mg 4 times daily for 2 weeks followed by 125 mg twice daily for 4 weeks.  Patient has mild nausea and  abdominal discomfort.  Diarrhea resolved.  Nontender on examination.  Tolerating p.o.  WBC count improved from 28 K on presentation to 6.2K on discharge.    Acute on chronic anemia.  History of GI bleed.  Hemoglobin on admission 10.8 and decreased to 7.  Given 1 unit of PRBC on 5/28 and hemoglobin improved to 8.9.  Continue Protonix.  Discontinue aspirin which she was getting for DVT prophylaxis.  Workup of microcytic anemia ordered including reticulocyte count, peripheral smear and TSH.    Chronic respiratory failure with hypoxia.  Patient had ongoing cough and minimal oxygen requirement since her previous admission with CT on 5/26 showing mild bronchial wall thickening with no pulmonary infiltrates.  Now weaned off oxygen.    Chronic back pain with history of vertebral compression fractures T11-L3  Continue pain management with as needed Dilaudid upon discharge.    Left tibia-fibula ORIF on 5/3.  Nonweightbearing on the left leg till next orthopedics appointment on 6/25.  Continue vitamin D supplementation and as needed methocarbamol.  Stopped aspirin for DVT prophylaxis due to anemia.    Abnormal urine culture.  History of UTI and nephrolithiasis.  More than 100,000 gram-negative rods in urine but she has not had a negative culture going back to May 2021.  Does not have any UTI symptoms.    This is likely colonization, no antibiotics.    Hypertension.  PTA regimen of carvedilol, lisinopril, Isordil, amlodipine and Lasix continued    Mild cognitive impairment.      Depression and anxiety.  Continue sertraline.    Consultations This Hospital Stay   GASTROENTEROLOGY IP CONSULT  SOCIAL WORK IP CONSULT  PHYSICAL THERAPY ADULT IP CONSULT  OCCUPATIONAL THERAPY ADULT IP CONSULT  PHYSICAL THERAPY ADULT IP CONSULT    Code Status   No CPR- Do NOT Intubate    Time Spent on this Encounter   I, Tevin Barlow MD, personally saw the patient today and spent greater than 30 minutes discharging this patient.       MD NICK Villanueva  [facility]  [address]  [facility] [address]  Phone: [phone]  Fax: [phone]  ______________________________________________________________________    Physical Exam   Vital Signs: Temp: 96.9  F (36.1  C) Temp src: Temporal BP: (!) 165/71 Pulse: 65   Resp: 18 SpO2: 98 % O2 Device: None (Room air) Oxygen Delivery: 1/2 LPM  Weight: 118 lbs 6.19 oz  General Appearance:  Alert, awake and oriented x 3.  Respiratory: Clear to auscultation.  Cardiovascular: S1-S2 normal  GI: No point tenderness.  Skin: No rash.  Other:  No edema.       Primary Care Physician   DINORAH DISLA    Discharge Orders      General info for SNF    Length of Stay Estimate: Short Term Care: Estimated # of Days 31-90  Condition at Discharge: Stable  Level of care:skilled   Rehabilitation Potential: Fair  Admission H&P remains valid and up-to-date: Yes  Recent Chemotherapy: N/A  Use Nursing Home Standing Orders: Yes     Mantoux instructions    Give two-step Mantoux (PPD) Per Facility Policy Yes     Follow Up and recommended labs and tests    Follow up with prison physician.  The following labs/tests are recommended: CBC and BMP in 1 week.     Reason for your hospital stay    C. difficile colitis     Weight bearing status    Nonweightbearing on the left leg until next orthopedics appointment.     Activity - Up with nursing assistance     Physical Therapy Adult Consult    Evaluate and treat as clinically indicated.    Reason: Left tibia/fibula fracture.     Contact Isolation    Enteric isolation for C. difficile     Fall precautions     Diet    Follow this diet upon discharge: Orders Placed This Encounter      Combination Diet Low Saturated Fat Na <2400mg Diet       Significant Results and Procedures   Most Recent 3 CBC's:  Recent Labs   Lab Test 05/31/24  0800 05/29/24  0736 05/28/24  0635   WBC 6.2 6.9  6.9 6.5   HGB 8.8* 8.9*  9.0* 7.0*   MCV 99 100  100 103*    199  208 188     Most Recent  3 BMP's:  Recent Labs   Lab Test 05/31/24  0800 05/30/24  0650 05/29/24  0736 05/28/24  0635 05/27/24  0658   *  --   --  136 136   POTASSIUM 3.8 3.8 4.3 4.1 4.1   CHLORIDE 100  --   --  106 107   CO2 23  --   --  20* 18*   BUN 8.2  --   --  13.5 30.8*   CR 0.39*  --   --  0.44* 0.56   ANIONGAP 10  --   --  10 11   DAVE 8.5  --   --  9.3 8.8   GLC 91  --   --  85 88   ,   Results for orders placed or performed during the hospital encounter of 05/25/24   CT Chest/Abdomen/Pelvis w Contrast    Narrative    EXAM: CT CHEST/ABDOMEN/PELVIS W CONTRAST  LOCATION: [facility]  DATE: 5/26/2024    INDICATION: Abd pain, leukocytosis  COMPARISON: None.  TECHNIQUE: CT scan of the chest, abdomen, and pelvis was performed following injection of IV contrast. Multiplanar reformats were obtained. Dose reduction techniques were used.   CONTRAST: 59mL Isovue 370    FINDINGS:   LUNGS AND PLEURA: Trace right pleural effusion. Bibasilar atelectasis. Mild bronchial wall thickening.    MEDIASTINUM/AXILLAE: No thoracic aortic aneurysm. Mitral annulus calcifications. No lymphadenopathy. Mild circumferential proximal esophageal wall thickening.    CORONARY ARTERY CALCIFICATION: Severe.    HEPATOBILIARY: Cholelithiasis. No liver mass.    PANCREAS: No significant mass, duct dilatation, or inflammatory change.    SPLEEN: Normal.    ADRENAL GLANDS: No significant nodules.    KIDNEYS/BLADDER: There is a 6 mm nonobstructing left renal calculus. Otherwise unremarkable.    BOWEL: There is moderate wall thickening of the colon extending from the mid transverse colon through rectum consistent with colitis. Mild associated pericolonic fat stranding.    LYMPH NODES: Normal.    VASCULATURE: Extensive atherosclerotic calcifications.    PELVIC ORGANS: There is a 2.8 cm left ovarian cyst which does not require follow-up.    MUSCULOSKELETAL: Osteopenia. Chronic bilateral iliac wing fractures. Right hip arthroplasty. ORIF of the left  femur. Multilevel vertebral body compression fractures, likely chronic. Chronic bilateral pubic bone fractures.      Impression    IMPRESSION:  1.  Moderate colitis, likely infectious or inflammatory.    2.  Proximal esophageal wall thickening, likely secondary to esophagitis.       Discharge Medications   Current Discharge Medication List        START taking these medications    Details   pantoprazole (PROTONIX) 40 MG EC tablet Take 1 tablet (40 mg) by mouth daily for 5 days    Associated Diagnoses: Upper GI bleed      vancomycin (VANCOCIN) 125 MG capsule Take 1 capsule (125 mg) by mouth 4 times daily for 10 days, THEN 1 capsule (125 mg) 2 times daily for 30 days.    Associated Diagnoses: Clostridium difficile colitis           CONTINUE these medications which have CHANGED    Details   HYDROmorphone (DILAUDID) 2 MG tablet Take 1 tablet (2 mg) by mouth every 6 hours as needed for pain  Qty: 10 tablet, Refills: 0    Associated Diagnoses: Clostridium difficile colitis           CONTINUE these medications which have NOT CHANGED    Details   acetaminophen (TYLENOL) 500 MG tablet Take 1,000 mg by mouth 3 times daily      albuterol (PROVENTIL) (2.5 MG/3ML) 0.083% neb solution Take 1 vial (2.5 mg) by nebulization every 4 hours as needed for shortness of breath, wheezing or cough    Associated Diagnoses: Acute respiratory failure with hypoxia (H)      amLODIPine (NORVASC) 10 MG tablet Take 10 mg by mouth daily      carvedilol (COREG) 12.5 MG tablet Take 12.5 mg by mouth 2 times daily (with meals)      Cholecalciferol (VITAMIN D3) 2000 UNITS CAPS Take 2,000 Units by mouth daily       cloNIDine (CATAPRES) 0.1 MG tablet Take 0.1 mg by mouth at bedtime      cyanocobalamin (VITAMIN B-12) 1000 MCG tablet Take 1,000 mcg by mouth daily      Ferrous Gluconate 324 (37.5 Fe) MG TABS Take 1 tablet by mouth daily      furosemide (LASIX) 20 MG tablet Take 1 tablet (20 mg) by mouth daily    Associated Diagnoses: Pleural effusion       guaiFENesin (MUCINEX) 600 MG 12 hr tablet Take 1 tablet (600 mg) by mouth 2 times daily    Associated Diagnoses: Aspiration pneumonitis (H)      isosorbide dinitrate (ISORDIL) 10 MG tablet Take 1 tablet (10 mg) by mouth 3 times daily  Qty: 270 tablet, Refills: 3    Associated Diagnoses: Non-STEMI (non-ST elevated myocardial infarction) (H)      lisinopril (ZESTRIL) 30 MG tablet Take 30 mg by mouth daily      methocarbamol (ROBAXIN) 500 MG tablet Take 500 mg by mouth every 6 hours as needed for muscle spasms      Multiple Vitamins-Minerals (PRESERVISION AREDS) CAPS Take 1 capsule by mouth 2 times daily       nystatin (MYCOSTATIN) 176248 UNIT/GM external cream Apply topically 2 times daily as needed (rash)      ondansetron (ZOFRAN) 4 MG tablet Take 4 mg by mouth every 8 hours as needed for nausea      polyethylene glycol (MIRALAX/GLYCOLAX) packet Take 17 g by mouth daily as needed for constipation    Associated Diagnoses: Tibia/fibula fracture, right, closed, initial encounter      potassium chloride ER (K-TAB/KLOR-CON) 10 MEQ CR tablet Take 10 mEq by mouth daily      sertraline (ZOLOFT) 100 MG tablet Take 100 mg by mouth daily           STOP taking these medications       aspirin (ASA) 325 MG tablet Comments:   Reason for Stopping:         omeprazole (PRILOSEC) 20 MG DR capsule Comments:   Reason for Stopping:         senna-docusate (SENOKOT-S/PERICOLACE) 8.6-50 MG tablet Comments:   Reason for Stopping:             Allergies   Allergies   Allergen Reactions    Atorvastatin Muscle Pain (Myalgia)    Nitrofurantoin Muscle Pain (Myalgia) and Unknown     Body aches      Penicillins Unknown    Sulfa Antibiotics Other (See Comments)     Tolerated Bactrim May 2019  Entire family has allergy to Sulfa    Sulfasalazine Unknown

## 2024-05-31 NOTE — PLAN OF CARE
"Goal Outcome Evaluation:    Plan of Care Reviewed With: patient    Overall Patient Progress: no change    Outcome Evaluation: Up with A2 via lift. Diet continued but poor appetite. Pleasant overnight. O2 at 1LPM via NC. NWB of LLE. Plan for discharge to TCU today.    Problem: Adult Inpatient Plan of Care  Goal: Plan of Care Review  Description: The Plan of Care Review/Shift note should be completed every shift.  The Outcome Evaluation is a brief statement about your assessment that the patient is improving, declining, or no change.  This information will be displayed automatically on your shift  note.  Outcome: Progressing  Flowsheets (Taken 5/31/2024 0244)  Outcome Evaluation: Up with A2 via lift. Diet continued but poor appetite. Pleasant overnight. O2 at 1LPM via NC. NWB of LLE. Plan for discharge to TCU today.  Plan of Care Reviewed With: patient  Overall Patient Progress: no change  Goal: Patient-Specific Goal (Individualized)  Description: You can add care plan individualizations to a care plan. Examples of Individualization might be:  \"Parent requests to be called daily at 9am for status\", \"I have a hard time hearing out of my right ear\", or \"Do not touch me to wake me up as it startles  me\".  Outcome: Progressing  Goal: Absence of Hospital-Acquired Illness or Injury  Outcome: Progressing  Intervention: Identify and Manage Fall Risk  Recent Flowsheet Documentation  Taken 5/31/2024 0158 by Hugh Gordillo RN  Safety Promotion/Fall Prevention:   assistive device/personal items within reach   increased rounding and observation   room near nurse's station   safety round/check completed  Intervention: Prevent Skin Injury  Recent Flowsheet Documentation  Taken 5/31/2024 0158 by Hugh Gordillo RN  Body Position: supine, head elevated  Skin Protection: adhesive use limited  Device Skin Pressure Protection: positioning supports utilized  Intervention: Prevent and Manage VTE (Venous Thromboembolism) Risk  Recent " Flowsheet Documentation  Taken 5/31/2024 0158 by Hugh Gordillo RN  VTE Prevention/Management: SCDs (sequential compression devices) off  Intervention: Prevent Infection  Recent Flowsheet Documentation  Taken 5/31/2024 0158 by Hugh Gordillo RN  Infection Prevention: rest/sleep promoted  Goal: Optimal Comfort and Wellbeing  Outcome: Progressing  Goal: Readiness for Transition of Care  Outcome: Progressing

## 2024-06-01 ENCOUNTER — TRANSFERRED RECORDS (OUTPATIENT)
Dept: HEALTH INFORMATION MANAGEMENT | Facility: CLINIC | Age: 89
End: 2024-06-01
Payer: COMMERCIAL

## 2024-06-01 ENCOUNTER — PATIENT OUTREACH (OUTPATIENT)
Dept: CARE COORDINATION | Facility: CLINIC | Age: 89
End: 2024-06-01
Payer: COMMERCIAL

## 2024-06-01 NOTE — PROGRESS NOTES
Connected Care Resource Center: Connected Care Resource [place]    Background: Transitional Care Management program identified per system criteria and reviewed by [facility] Care Resource [facility] team for possible outreach.    Assessment: Upon chart review, CCRC Team member will not proceed with patient outreach related to this episode of Transitional Care Management program due to reason below:    Non-[facility] TCU: CCRC team member noted patient discharged to TCU/ARU/LTACH. Patient is not established with a [facility] currently supported by Primary Care-Care Coordination therefore handoff to Primary Care-Care Coordination is not appropriate at this time.    Plan: Transitional Care Management episode addressed appropriately per reason noted above.      Rita Ibrahim  Community Health Worker  [facility]  [phone]      *Connected Care Resource Team does NOT follow patient ongoing. Referrals are identified based on internal discharge reports and the outreach is to ensure patient has an understanding of their discharge instructions.

## 2024-06-03 ENCOUNTER — TRANSITIONAL CARE UNIT VISIT (OUTPATIENT)
Dept: GERIATRICS | Facility: CLINIC | Age: 89
End: 2024-06-03
Payer: COMMERCIAL

## 2024-06-03 VITALS
SYSTOLIC BLOOD PRESSURE: 132 MMHG | BODY MASS INDEX: 20.11 KG/M2 | DIASTOLIC BLOOD PRESSURE: 66 MMHG | OXYGEN SATURATION: 98 % | HEART RATE: 61 BPM | TEMPERATURE: 97.1 F | WEIGHT: 117.8 LBS | RESPIRATION RATE: 18 BRPM | HEIGHT: 64 IN

## 2024-06-03 DIAGNOSIS — N18.31 STAGE 3A CHRONIC KIDNEY DISEASE (H): ICD-10-CM

## 2024-06-03 DIAGNOSIS — R41.89 COGNITIVE IMPAIRMENT: ICD-10-CM

## 2024-06-03 DIAGNOSIS — G89.29 CHRONIC LOW BACK PAIN WITHOUT SCIATICA, UNSPECIFIED BACK PAIN LATERALITY: ICD-10-CM

## 2024-06-03 DIAGNOSIS — R53.81 PHYSICAL DECONDITIONING: ICD-10-CM

## 2024-06-03 DIAGNOSIS — S82.832D CLOSED FRACTURE OF DISTAL END OF LEFT FIBULA AND TIBIA WITH ROUTINE HEALING, SUBSEQUENT ENCOUNTER: ICD-10-CM

## 2024-06-03 DIAGNOSIS — Z87.81 H/O COMPRESSION FRACTURE OF SPINE: ICD-10-CM

## 2024-06-03 DIAGNOSIS — D62 ABLA (ACUTE BLOOD LOSS ANEMIA): ICD-10-CM

## 2024-06-03 DIAGNOSIS — R09.02 HYPOXIA: ICD-10-CM

## 2024-06-03 DIAGNOSIS — I10 BENIGN ESSENTIAL HYPERTENSION: ICD-10-CM

## 2024-06-03 DIAGNOSIS — S82.302D CLOSED FRACTURE OF DISTAL END OF LEFT FIBULA AND TIBIA WITH ROUTINE HEALING, SUBSEQUENT ENCOUNTER: ICD-10-CM

## 2024-06-03 DIAGNOSIS — K21.00 GASTROESOPHAGEAL REFLUX DISEASE WITH ESOPHAGITIS, UNSPECIFIED WHETHER HEMORRHAGE: ICD-10-CM

## 2024-06-03 DIAGNOSIS — A04.72 CLOSTRIDIUM DIFFICILE COLITIS: Primary | ICD-10-CM

## 2024-06-03 DIAGNOSIS — M54.50 CHRONIC LOW BACK PAIN WITHOUT SCIATICA, UNSPECIFIED BACK PAIN LATERALITY: ICD-10-CM

## 2024-06-03 PROCEDURE — 99309 SBSQ NF CARE MODERATE MDM 30: CPT | Performed by: PHYSICIAN ASSISTANT

## 2024-06-03 NOTE — LETTER
6/3/2024        RE: Daya Ignacio  [address]  [facility] [id number]        [facility] GERIATRICS    PRIMARY CARE PROVIDER AND CLINIC:  [facility] [address] [facility] [address] / [place]  Chief Complaint   Patient presents with     Hospital F/U      [place] Medical Record Number:  [id number]  Place of Service where encounter took place:  [facility] ([facility]) [[id number]]    Daya Ignacio  is a 89 year old  (10/11/1930), admitted to the above facility from  [facility]. Hospital stay 5/25/24 through 5/31/24..   HPI:    Notes from hospitalization reviewed including H&P GI consult and D/c summary    Daya Ignacio is a 89-year-old female with a past medical history of hypertension, chronic back pain, mild cognitive impairment, GERD, previous C. difficile, history of right tib-fib fracture with recent recurrent hospitalizations at [facility].    Initially hospitalized 5/1 - 5/8 after presenting from her [facility] with a fall. Found to have a distal tib-fib fracture.  Orthopedics consulted underwent surgical ORIF.  Postoperative course complicated by hypoxia with cough.  Chest x-ray negative.  Treated supportively.  Assessed by therapies and discharged to TCU    Readmitted 5/25 - 5/31 after presenting from TCU with abdominal pain and diarrhea.  Found to have significant leukocytosis at 28.  CT scan concerning for colitis.  C. difficile PCR and toxin positive.  GI consulted and recommended p.o. vancomycin and taper but may consider FMT given severity.  Hospital course complicated by acute chronic anemia for which she required a transfusion.  Now discharged back to TCU    Spoke with RN.  No concerns.    Daya is evaluated in her room.  Just finished working with occupational therapy.  Still think she is in the hospital.  Does complain of some back pain.  Was unable to sit at the edge of the bed without significant assistance from OT.   No abdominal pain.  Still on 2 L of oxygen.  Denies cough or shortness of breath    Review of nursing home EMR: [id number]    CODE STATUS/ADVANCE DIRECTIVES DISCUSSION:  Prior  DNR / DNI  ALLERGIES:   Allergies   Allergen Reactions     Atorvastatin Muscle Pain (Myalgia)     Nitrofurantoin Muscle Pain (Myalgia) and Unknown     Body aches       Penicillins Unknown     Sulfa Antibiotics Other (See Comments)     Tolerated Bactrim May 2019  Entire family has allergy to Sulfa     Sulfasalazine Unknown      PAST MEDICAL HISTORY:   Past Medical History:   Diagnosis Date     Anemia      Anxiety      Arthritis      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 (H28) 04/16/2014     Chronic low back pain      Depression      h/o Clostridium difficile colitis 06/2019     h/o Mumps      Hypertension      Kidney stone      Lumbar compression fracture      Macular degeneration (senile) of retina, unspecified      Mild cognitive impairment      NSTEMI (non-ST elevated myocardial infarction) (H) 05/28/2017     Recurrent UTI      Seasonal allergic rhinitis      Upper GI bleed 06/2019     Urinary tract infection due to extended-spectrum beta lactamase (ESBL)-producing Klebsiella 05/2019    resistant to Bactrim      PAST SURGICAL HISTORY:   has a past surgical history that includes Esophagoscopy, gastroscopy, duodenoscopy (EGD), combined (N/A, 06/11/2019); Cystoscopy (N/A, 08/01/2019); Tonsillectomy, adenoidectomy, combined; Cataract removal NOS (Bilateral); Open reduction internal fixation tibial plateau (Right, 08/28/2019); Combined Cystoscopy, Insert Stent Ureter(s) (Left, 05/06/2020); Extracorporeal shock wave lithotripsy (ESWL) (Left, 05/28/2020); Laser holmium lithotripsy ureter(s), insert stent, combined (Left, 10/30/2020); Open reduction internal fixation ankle (Left, 05/03/2024); Open reduction internal fixation rodding intramedullary tibia (Left, 05/03/2024); Open reduction internal fixation hip (Left); and Arthroplasty hip  (Right).  FAMILY HISTORY: family history includes Alzheimer Disease in her mother; Cardiovascular in her father; Neurologic Disorder (age of onset: 83) in her brother.  SOCIAL HISTORY:   reports that she has quit smoking. Her smoking use included cigarettes. She has never used smokeless tobacco. She reports that she does not drink alcohol and does not use drugs.  Patient's living condition: lives alone    Post Discharge Medication Reconciliation Status:   MED REC REQUIRED  Post Medication Reconciliation Status: discharge medications reconciled and changed, per note/orders       Current Outpatient Medications   Medication Sig Dispense Refill     acetaminophen (TYLENOL) 500 MG tablet Take 1,000 mg by mouth 3 times daily       albuterol (PROVENTIL) (2.5 MG/3ML) 0.083% neb solution Take 1 vial (2.5 mg) by nebulization every 4 hours as needed for shortness of breath, wheezing or cough       amLODIPine (NORVASC) 10 MG tablet Take 10 mg by mouth daily       carvedilol (COREG) 12.5 MG tablet Take 12.5 mg by mouth 2 times daily (with meals)       Cholecalciferol (VITAMIN D3) 2000 UNITS CAPS Take 2,000 Units by mouth daily        cloNIDine (CATAPRES) 0.1 MG tablet Take 0.1 mg by mouth at bedtime       cyanocobalamin (VITAMIN B-12) 1000 MCG tablet Take 1,000 mcg by mouth daily       Ferrous Gluconate 324 (37.5 Fe) MG TABS Take 1 tablet by mouth daily       furosemide (LASIX) 20 MG tablet Take 1 tablet (20 mg) by mouth daily       guaiFENesin (MUCINEX) 600 MG 12 hr tablet Take 1 tablet (600 mg) by mouth 2 times daily       HYDROmorphone (DILAUDID) 2 MG tablet Take 1 tablet (2 mg) by mouth every 6 hours as needed for pain 10 tablet 0     isosorbide dinitrate (ISORDIL) 10 MG tablet Take 1 tablet (10 mg) by mouth 3 times daily 270 tablet 3     lisinopril (ZESTRIL) 30 MG tablet Take 30 mg by mouth daily       methocarbamol (ROBAXIN) 500 MG tablet Take 500 mg by mouth every 6 hours as needed for muscle spasms       Multiple  "Vitamins-Minerals (PRESERVISION AREDS) CAPS Take 1 capsule by mouth 2 times daily        nystatin (MYCOSTATIN) 678414 UNIT/GM external cream Apply topically 2 times daily as needed (rash)       ondansetron (ZOFRAN) 4 MG tablet Take 4 mg by mouth every 8 hours as needed for nausea       pantoprazole (PROTONIX) 40 MG EC tablet Take 1 tablet (40 mg) by mouth daily for 5 days       polyethylene glycol (MIRALAX/GLYCOLAX) packet Take 17 g by mouth daily as needed for constipation       potassium chloride ER (K-TAB/KLOR-CON) 10 MEQ CR tablet Take 10 mEq by mouth daily       sertraline (ZOLOFT) 100 MG tablet Take 100 mg by mouth daily       vancomycin (VANCOCIN) 125 MG capsule Take 1 capsule (125 mg) by mouth 4 times daily for 10 days, THEN 1 capsule (125 mg) 2 times daily for 30 days.       No current facility-administered medications for this visit.       ROS:  10 point ROS of systems including Constitutional, Eyes, Respiratory, Cardiovascular, Gastroenterology, Genitourinary, Integumentary, Musculoskeletal, Psychiatric were all negative except for pertinent positives noted in my HPI.    Vitals:  /66   Pulse 61   Temp 97.1  F (36.2  C)   Resp 18   Ht 1.626 m (5' 4\")   Wt 53.4 kg (117 lb 12.8 oz)   SpO2 98%   BMI 20.22 kg/m    Exam:  Physical Exam  Vitals (Facility EMR) reviewed.   Constitutional:       General: She is not in acute distress.     Comments: Frail appearing   HENT:      Head: Normocephalic and atraumatic.   Eyes:      General: No scleral icterus.  Cardiovascular:      Rate and Rhythm: Normal rate and regular rhythm.      Heart sounds: No murmur heard.  Pulmonary:      Effort: Pulmonary effort is normal.      Breath sounds: No wheezing or rales.      Comments: On 2L  Abdominal:      General: There is no distension.      Tenderness: There is no abdominal tenderness.   Musculoskeletal:      Right lower leg: No edema.      Left lower leg: No edema.   Skin:     General: Skin is warm and dry.      " Findings: No rash.   Neurological:      Mental Status: She is alert. Mental status is at baseline.   Psychiatric:         Behavior: Behavior normal.         Lab/Diagnostic data:  Recent labs in [facility] reviewed by me today.  and Most Recent 3 CBC's:  Recent Labs   Lab Test 05/31/24  0800 05/29/24  0736 05/28/24  0635   WBC 6.2 6.9  6.9 6.5   HGB 8.8* 8.9*  9.0* 7.0*   MCV 99 100  100 103*    199  208 188     Most Recent 3 BMP's:  Recent Labs   Lab Test 05/31/24  0800 05/30/24  0650 05/29/24  0736 05/28/24  0635 05/27/24  0658   *  --   --  136 136   POTASSIUM 3.8 3.8 4.3 4.1 4.1   CHLORIDE 100  --   --  106 107   CO2 23  --   --  20* 18*   BUN 8.2  --   --  13.5 30.8*   CR 0.39*  --   --  0.44* 0.56   ANIONGAP 10  --   --  10 11   DAVE 8.5  --   --  9.3 8.8   GLC 91  --   --  85 88     Most Recent 2 LFT's:  Recent Labs   Lab Test 05/28/24  0635 05/26/24  0012   AST 10 16   ALT 5 5   ALKPHOS 179* 289*   BILITOTAL 0.6 0.8     7-Day Micro Results       No results found for the last 168 hours.          Most Recent TSH and T4:  Recent Labs   Lab Test 05/29/24  0736   TSH 1.89     Most Recent Hemoglobin A1c:  Recent Labs   Lab Test 01/13/17  1020   A1C 5.3     Most Recent 6 glucoses:  Recent Labs   Lab Test 05/31/24  0800 05/28/24  0635 05/27/24  0658 05/26/24  1230 05/26/24  0012 05/24/24  1004   GLC 91 85 88 121* 143* 151*     Most Recent Urinalysis:  Recent Labs   Lab Test 05/26/24  0457 05/06/20  1035 04/07/20  1400   COLOR Yellow   < > Yellow   APPEARANCE Slightly Cloudy*   < > Cloudy*   URINEGLC Negative   < > Negative   URINEBILI Negative   < > Negative   URINEKETONE Negative   < > Negative   SG 1.019   < > 1.013   UBLD Small*   < > Moderate*   URINEPH 5.0   < > 7.5   PROTEIN Negative   < > 30 mg/dL*   UROBILINOGEN  --   --  <2.0 E.U./dL   NITRITE Negative   < > Negative   LEUKEST Large*   < > Large*   RBCU 10*   < > >100*   WBCU 59*   < > >100*    < > = values in this interval not displayed.     Most  Recent Anemia Panel:  Recent Labs   Lab Test 05/31/24  0800 05/29/24  0736 05/28/24  1303 05/28/24  0635 05/01/24  2219 09/27/23  0700 01/23/23  1817 01/04/23  1301   WBC 6.2 6.9  6.9  --  6.5   < > 4.7   < > 7.4   HGB 8.8* 8.9*  9.0*  --  7.0*   < > 10.0*   < > 9.6*   HCT 26.4* 26.7*  27.1*  --  21.8*   < > 29.6*   < > 30.9*   MCV 99 100  100  --  103*   < > 100   < > 100    199  208  --  188   < > 229   < > 244   IRON  --   --   --   --   --  77  --  88   IRONSAT  --   --   --   --   --  36  --  37   RETICABSCT  --  0.019*  0.021*  --   --   --  0.049   < >  --    RETP  --  0.7  0.8  --   --   --  1.7   < >  --    FEB  --   --   --   --   --  213*  --  238*   CHINYERE  --   --   --   --   --   --   --  339*   B12  --   --   --  >4,000*  --  953  --  460   FOLIC  --   --  13.9  --   --  31.4  --  8.2    < > = values in this interval not displayed.       Component  Ref Range & Units 9 d ago     C. difficile GDH Antigen  Negative Positive Abnormal     C. difficile Toxin  Negative Negative   Resulting Agency [facility]              Narrative  Performed by: [facility]  C. difficile GDH antigen detected and C. difficile toxin not detected by enzyme immunoassay. These results indicate the organism is present and the toxin is absent or below the limit of detection. Results must be interpreted based on clinical findings.                Component  Ref Range & Units 9 d ago     C Difficile Toxin B by PCR  Negative Positive Abnormal    Comment: Detection of C. difficile nucleic acid in stools confirms the presence of these organisms in diarrheal patients but may not indicate that C. difficile is the etiologic agent of the diarrhea. Results from the Xpert C. difficile assay should be interpreted in conjunction with other laboratory and clinical data available to the clinician.    Patients with a positive C. difficile PCR result will receive reflex GDH/Toxin Immunoassay testing. Please interpret the PCR test result in conjunction  with GDH/Toxin Immunoassay results and the clinical status of patient.     CT Chest/Abdomen/Pelvis w Contrast     Narrative     EXAM: CT CHEST/ABDOMEN/PELVIS W CONTRAST  LOCATION: [facility]  DATE: 5/26/2024     INDICATION: Abd pain, leukocytosis  COMPARISON: None.  TECHNIQUE: CT scan of the chest, abdomen, and pelvis was performed following injection of IV contrast. Multiplanar reformats were obtained. Dose reduction techniques were used.   CONTRAST: 59mL Isovue 370     FINDINGS:   LUNGS AND PLEURA: Trace right pleural effusion. Bibasilar atelectasis. Mild bronchial wall thickening.     MEDIASTINUM/AXILLAE: No thoracic aortic aneurysm. Mitral annulus calcifications. No lymphadenopathy. Mild circumferential proximal esophageal wall thickening.     CORONARY ARTERY CALCIFICATION: Severe.     HEPATOBILIARY: Cholelithiasis. No liver mass.     PANCREAS: No significant mass, duct dilatation, or inflammatory change.     SPLEEN: Normal.     ADRENAL GLANDS: No significant nodules.     KIDNEYS/BLADDER: There is a 6 mm nonobstructing left renal calculus. Otherwise unremarkable.     BOWEL: There is moderate wall thickening of the colon extending from the mid transverse colon through rectum consistent with colitis. Mild associated pericolonic fat stranding.     LYMPH NODES: Normal.     VASCULATURE: Extensive atherosclerotic calcifications.     PELVIC ORGANS: There is a 2.8 cm left ovarian cyst which does not require follow-up.     MUSCULOSKELETAL: Osteopenia. Chronic bilateral iliac wing fractures. Right hip arthroplasty. ORIF of the left femur. Multilevel vertebral body compression fractures, likely chronic. Chronic bilateral pubic bone fractures.        Impression     IMPRESSION:  1.  Moderate colitis, likely infectious or inflammatory.     2.  Proximal esophageal wall thickening, likely secondary to esophagitis.       ASSESSMENT/PLAN:  C. difficile colitis: Presented for evaluation of abdominal pain and  diarrhea.  CT scan with evidence of colitis and esophagitis.  C. difficile PCR and toxin positive.  GI consulted and recommended p.o. vancomycin with consideration for outpatient FMT  -Vancomycin 4 times daily through 6/9 followed by twice daily until seen by [facility]  - Follow-up with [facility] in 3-4 weeks  - Continue regular 2 g sodium diet  - BMP and CBC 6/6    Acute on chronic anemia  History of GI bleed  Esophagitis: Did receive 1 unit PRBCs 5/28 for hemoglobin 6.8.  Discharge hemoglobin 8.8.  Aspirin for DVT prophylaxis discontinued  - Continue Protonix twice daily  - Repeat CBC 6/6 with iron studies      Fall  Acute comminuted distal left tibia/fibular fracture status post ORIF 5/3/2024:   - Continues NWB at least until next Ortho Follow up 6/25  -Pain control as discussed below  - Aspirin for DVT prophylaxis  - PT/OT/social work.  - Continue vitamin D supplementation:    Acute on Chronic pain Back Pain  H/o Vertebral compression Fx T11-L3: Spoke with son Gael 5/23 x 14 min.  Patient has longstanding history of chronic back pain related to known compression fractures.  Back pain likely limiting therapy participation more than acute tib-fib fracture.    - Continue scheduled Tylenol  - Adjust methocarbamol to 250 mg 3 times daily  - Continue as needed Dilaudid      Hypoxia, subacute  Right Pleural Effusion: Present since initial hospitalization for t/b fx. Unable to wean at TCU. Repeat CXR 5/13 at TCU with pleural effusion and concern for volume overload and started on low dose Lasix. Repeat CT on hospital readmission 6/25 with only trace right pleural effusion and atelectasis  - Suspect hypoxia related to atelectasis and minimal OOB activity  - D/c Lasix and KCL  - Encouarge OOB  - Wean O2 as able      Hypertension:  - Continue Coreg, lisinopril, Isordil, amlodipine and clonidine      CKD stage IIIa: Estimated Creatinine Clearance: 76 mL/min (A) (based on SCr of 0.39 mg/dL (L)).  -BMP 6/6    Cognitive  Impairment  MDD  Anxiety  -Continue Zoloft  -Cognition scores at [facility] with slums 8/30.  - OT and social work following.  Lives in [place]    This note was completed in part using Dragon voice recognition software. Although reviewed after completion, some word and grammatical errors may occur.      Electronically signed by:  Olivia Melara PA-C                      Sincerely,        Olivia Melara PA-C

## 2024-06-03 NOTE — PROGRESS NOTES
[facility] GERIATRICS    PRIMARY CARE PROVIDER AND CLINIC:  ALICIA [facility] [address] [address] / [place]  Chief Complaint   Patient presents with    Hospital F/U      [facility] Record Number:  [id number]  Place of Service where encounter took place:  [facility] ([facility]) [[id number]]    Daya Ignacio  is a 89 year old  (10/11/1930), admitted to the above facility from  [facility]. Hospital stay 5/25/24 through 5/31/24..   HPI:    Notes from hospitalization reviewed including H&P GI consult and D/c summary    Daya Ignacio is a 89-year-old female with a past medical history of hypertension, chronic back pain, mild cognitive impairment, GERD, previous C. difficile, history of right tib-fib fracture with recent recurrent hospitalizations at [facility].    Initially hospitalized 5/1 - 5/8 after presenting from her [facility] with a fall. Found to have a distal tib-fib fracture.  Orthopedics consulted underwent surgical ORIF.  Postoperative course complicated by hypoxia with cough.  Chest x-ray negative.  Treated supportively.  Assessed by therapies and discharged to TCU    Readmitted 5/25 - 5/31 after presenting from TCU with abdominal pain and diarrhea.  Found to have significant leukocytosis at 28.  CT scan concerning for colitis.  C. difficile PCR and toxin positive.  GI consulted and recommended p.o. vancomycin and taper but may consider FMT given severity.  Hospital course complicated by acute chronic anemia for which she required a transfusion.  Now discharged back to TCU    Spoke with RN.  No concerns.    Daya is evaluated in her room.  Just finished working with occupational therapy.  Still think she is in the hospital.  Does complain of some back pain.  Was unable to sit at the edge of the bed without significant assistance from OT.  No abdominal pain.  Still on 2 L of oxygen.  Denies cough or shortness of breath    Review of nursing  home EMR: 115-132    CODE STATUS/ADVANCE DIRECTIVES DISCUSSION:  Prior  DNR / DNI  ALLERGIES:   Allergies   Allergen Reactions    Atorvastatin Muscle Pain (Myalgia)    Nitrofurantoin Muscle Pain (Myalgia) and Unknown     Body aches      Penicillins Unknown    Sulfa Antibiotics Other (See Comments)     Tolerated Bactrim May 2019  Entire family has allergy to Sulfa    Sulfasalazine Unknown      PAST MEDICAL HISTORY:   Past Medical History:   Diagnosis Date    Anemia     Anxiety     Arthritis     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 (H28) 04/16/2014    Chronic low back pain     Depression     h/o Clostridium difficile colitis 06/2019    h/o Mumps     Hypertension     Kidney stone     Lumbar compression fracture     Macular degeneration (senile) of retina, unspecified     Mild cognitive impairment     NSTEMI (non-ST elevated myocardial infarction) (H) 05/28/2017    Recurrent UTI     Seasonal allergic rhinitis     Upper GI bleed 06/2019    Urinary tract infection due to extended-spectrum beta lactamase (ESBL)-producing Klebsiella 05/2019    resistant to Bactrim      PAST SURGICAL HISTORY:   has a past surgical history that includes Esophagoscopy, gastroscopy, duodenoscopy (EGD), combined (N/A, 06/11/2019); Cystoscopy (N/A, 08/01/2019); Tonsillectomy, adenoidectomy, combined; Cataract removal NOS (Bilateral); Open reduction internal fixation tibial plateau (Right, 08/28/2019); Combined Cystoscopy, Insert Stent Ureter(s) (Left, 05/06/2020); Extracorporeal shock wave lithotripsy (ESWL) (Left, 05/28/2020); Laser holmium lithotripsy ureter(s), insert stent, combined (Left, 10/30/2020); Open reduction internal fixation ankle (Left, 05/03/2024); Open reduction internal fixation rodding intramedullary tibia (Left, 05/03/2024); Open reduction internal fixation hip (Left); and Arthroplasty hip (Right).  FAMILY HISTORY: family history includes Alzheimer Disease in her mother; Cardiovascular in her father; Neurologic Disorder  (age of onset: 83) in her brother.  SOCIAL HISTORY:   reports that she has quit smoking. Her smoking use included cigarettes. She has never used smokeless tobacco. She reports that she does not drink alcohol and does not use drugs.  Patient's living condition: lives alone    Post Discharge Medication Reconciliation Status:   MED REC REQUIRED  Post Medication Reconciliation Status: discharge medications reconciled and changed, per note/orders       Current Outpatient Medications   Medication Sig Dispense Refill    acetaminophen (TYLENOL) 500 MG tablet Take 1,000 mg by mouth 3 times daily      albuterol (PROVENTIL) (2.5 MG/3ML) 0.083% neb solution Take 1 vial (2.5 mg) by nebulization every 4 hours as needed for shortness of breath, wheezing or cough      amLODIPine (NORVASC) 10 MG tablet Take 10 mg by mouth daily      carvedilol (COREG) 12.5 MG tablet Take 12.5 mg by mouth 2 times daily (with meals)      Cholecalciferol (VITAMIN D3) 2000 UNITS CAPS Take 2,000 Units by mouth daily       cloNIDine (CATAPRES) 0.1 MG tablet Take 0.1 mg by mouth at bedtime      cyanocobalamin (VITAMIN B-12) 1000 MCG tablet Take 1,000 mcg by mouth daily      Ferrous Gluconate 324 (37.5 Fe) MG TABS Take 1 tablet by mouth daily      furosemide (LASIX) 20 MG tablet Take 1 tablet (20 mg) by mouth daily      guaiFENesin (MUCINEX) 600 MG 12 hr tablet Take 1 tablet (600 mg) by mouth 2 times daily      HYDROmorphone (DILAUDID) 2 MG tablet Take 1 tablet (2 mg) by mouth every 6 hours as needed for pain 10 tablet 0    isosorbide dinitrate (ISORDIL) 10 MG tablet Take 1 tablet (10 mg) by mouth 3 times daily 270 tablet 3    lisinopril (ZESTRIL) 30 MG tablet Take 30 mg by mouth daily      methocarbamol (ROBAXIN) 500 MG tablet Take 500 mg by mouth every 6 hours as needed for muscle spasms      Multiple Vitamins-Minerals (PRESERVISION AREDS) CAPS Take 1 capsule by mouth 2 times daily       nystatin (MYCOSTATIN) 390020 UNIT/GM external cream Apply topically  "2 times daily as needed (rash)      ondansetron (ZOFRAN) 4 MG tablet Take 4 mg by mouth every 8 hours as needed for nausea      pantoprazole (PROTONIX) 40 MG EC tablet Take 1 tablet (40 mg) by mouth daily for 5 days      polyethylene glycol (MIRALAX/GLYCOLAX) packet Take 17 g by mouth daily as needed for constipation      potassium chloride ER (K-TAB/KLOR-CON) 10 MEQ CR tablet Take 10 mEq by mouth daily      sertraline (ZOLOFT) 100 MG tablet Take 100 mg by mouth daily      vancomycin (VANCOCIN) 125 MG capsule Take 1 capsule (125 mg) by mouth 4 times daily for 10 days, THEN 1 capsule (125 mg) 2 times daily for 30 days.       No current facility-administered medications for this visit.       ROS:  10 point ROS of systems including Constitutional, Eyes, Respiratory, Cardiovascular, Gastroenterology, Genitourinary, Integumentary, Musculoskeletal, Psychiatric were all negative except for pertinent positives noted in my HPI.    Vitals:  /66   Pulse 61   Temp 97.1  F (36.2  C)   Resp 18   Ht 1.626 m (5' 4\")   Wt 53.4 kg (117 lb 12.8 oz)   SpO2 98%   BMI 20.22 kg/m    Exam:  Physical Exam  Vitals (Facility EMR) reviewed.   Constitutional:       General: She is not in acute distress.     Comments: Frail appearing   HENT:      Head: Normocephalic and atraumatic.   Eyes:      General: No scleral icterus.  Cardiovascular:      Rate and Rhythm: Normal rate and regular rhythm.      Heart sounds: No murmur heard.  Pulmonary:      Effort: Pulmonary effort is normal.      Breath sounds: No wheezing or rales.      Comments: On 2L  Abdominal:      General: There is no distension.      Tenderness: There is no abdominal tenderness.   Musculoskeletal:      Right lower leg: No edema.      Left lower leg: No edema.   Skin:     General: Skin is warm and dry.      Findings: No rash.   Neurological:      Mental Status: She is alert. Mental status is at baseline.   Psychiatric:         Behavior: Behavior normal. "         Lab/Diagnostic data:  Recent labs in [facility] reviewed by me today.  and Most Recent 3 CBC's:  Recent Labs   Lab Test 05/31/24  0800 05/29/24  0736 05/28/24  0635   WBC 6.2 6.9  6.9 6.5   HGB 8.8* 8.9*  9.0* 7.0*   MCV 99 100  100 103*    199  208 188     Most Recent 3 BMP's:  Recent Labs   Lab Test 05/31/24  0800 05/30/24  0650 05/29/24  0736 05/28/24  0635 05/27/24  0658   *  --   --  136 136   POTASSIUM 3.8 3.8 4.3 4.1 4.1   CHLORIDE 100  --   --  106 107   CO2 23  --   --  20* 18*   BUN 8.2  --   --  13.5 30.8*   CR 0.39*  --   --  0.44* 0.56   ANIONGAP 10  --   --  10 11   DAVE 8.5  --   --  9.3 8.8   GLC 91  --   --  85 88     Most Recent 2 LFT's:  Recent Labs   Lab Test 05/28/24  0635 05/26/24  0012   AST 10 16   ALT 5 5   ALKPHOS 179* 289*   BILITOTAL 0.6 0.8     7-Day Micro Results       No results found for the last 168 hours.          Most Recent TSH and T4:  Recent Labs   Lab Test 05/29/24  0736   TSH 1.89     Most Recent Hemoglobin A1c:  Recent Labs   Lab Test 01/13/17  1020   A1C 5.3     Most Recent 6 glucoses:  Recent Labs   Lab Test 05/31/24  0800 05/28/24  0635 05/27/24  0658 05/26/24  1230 05/26/24  0012 05/24/24  1004   GLC 91 85 88 121* 143* 151*     Most Recent Urinalysis:  Recent Labs   Lab Test 05/26/24  0457 05/06/20  1035 04/07/20  1400   COLOR Yellow   < > Yellow   APPEARANCE Slightly Cloudy*   < > Cloudy*   URINEGLC Negative   < > Negative   URINEBILI Negative   < > Negative   URINEKETONE Negative   < > Negative   SG 1.019   < > 1.013   UBLD Small*   < > Moderate*   URINEPH 5.0   < > 7.5   PROTEIN Negative   < > 30 mg/dL*   UROBILINOGEN  --   --  <2.0 E.U./dL   NITRITE Negative   < > Negative   LEUKEST Large*   < > Large*   RBCU 10*   < > >100*   WBCU 59*   < > >100*    < > = values in this interval not displayed.     Most Recent Anemia Panel:  Recent Labs   Lab Test 05/31/24  0800 05/29/24  0736 05/28/24  1303 05/28/24  0635 05/01/24  2219 09/27/23  0700 01/23/23  1817  01/04/23  1301   WBC 6.2 6.9  6.9  --  6.5   < > 4.7   < > 7.4   HGB 8.8* 8.9*  9.0*  --  7.0*   < > 10.0*   < > 9.6*   HCT 26.4* 26.7*  27.1*  --  21.8*   < > 29.6*   < > 30.9*   MCV 99 100  100  --  103*   < > 100   < > 100    199  208  --  188   < > 229   < > 244   IRON  --   --   --   --   --  77  --  88   IRONSAT  --   --   --   --   --  36  --  37   RETICABSCT  --  0.019*  0.021*  --   --   --  0.049   < >  --    RETP  --  0.7  0.8  --   --   --  1.7   < >  --    FEB  --   --   --   --   --  213*  --  238*   CHINYERE  --   --   --   --   --   --   --  339*   B12  --   --   --  >4,000*  --  953  --  460   FOLIC  --   --  13.9  --   --  31.4  --  8.2    < > = values in this interval not displayed.       Component  Ref Range & Units 9 d ago     C. difficile GDH Antigen  Negative Positive Abnormal     C. difficile Toxin  Negative Negative   Resulting Agency [facility]              Narrative  Performed by: [facility]  C. difficile GDH antigen detected and C. difficile toxin not detected by enzyme immunoassay. These results indicate the organism is present and the toxin is absent or below the limit of detection. Results must be interpreted based on clinical findings.                Component  Ref Range & Units 9 d ago     C Difficile Toxin B by PCR  Negative Positive Abnormal    Comment: Detection of C. difficile nucleic acid in stools confirms the presence of these organisms in diarrheal patients but may not indicate that C. difficile is the etiologic agent of the diarrhea. Results from the Xpert C. difficile assay should be interpreted in conjunction with other laboratory and clinical data available to the clinician.    Patients with a positive C. difficile PCR result will receive reflex GDH/Toxin Immunoassay testing. Please interpret the PCR test result in conjunction with GDH/Toxin Immunoassay results and the clinical status of patient.     CT Chest/Abdomen/Pelvis w Contrast     Narrative     EXAM: CT  CHEST/ABDOMEN/PELVIS W CONTRAST  LOCATION: [facility]  DATE: 5/26/2024     INDICATION: Abd pain, leukocytosis  COMPARISON: None.  TECHNIQUE: CT scan of the chest, abdomen, and pelvis was performed following injection of IV contrast. Multiplanar reformats were obtained. Dose reduction techniques were used.   CONTRAST: 59mL Isovue 370     FINDINGS:   LUNGS AND PLEURA: Trace right pleural effusion. Bibasilar atelectasis. Mild bronchial wall thickening.     MEDIASTINUM/AXILLAE: No thoracic aortic aneurysm. Mitral annulus calcifications. No lymphadenopathy. Mild circumferential proximal esophageal wall thickening.     CORONARY ARTERY CALCIFICATION: Severe.     HEPATOBILIARY: Cholelithiasis. No liver mass.     PANCREAS: No significant mass, duct dilatation, or inflammatory change.     SPLEEN: Normal.     ADRENAL GLANDS: No significant nodules.     KIDNEYS/BLADDER: There is a 6 mm nonobstructing left renal calculus. Otherwise unremarkable.     BOWEL: There is moderate wall thickening of the colon extending from the mid transverse colon through rectum consistent with colitis. Mild associated pericolonic fat stranding.     LYMPH NODES: Normal.     VASCULATURE: Extensive atherosclerotic calcifications.     PELVIC ORGANS: There is a 2.8 cm left ovarian cyst which does not require follow-up.     MUSCULOSKELETAL: Osteopenia. Chronic bilateral iliac wing fractures. Right hip arthroplasty. ORIF of the left femur. Multilevel vertebral body compression fractures, likely chronic. Chronic bilateral pubic bone fractures.        Impression     IMPRESSION:  1.  Moderate colitis, likely infectious or inflammatory.     2.  Proximal esophageal wall thickening, likely secondary to esophagitis.       ASSESSMENT/PLAN:  C. difficile colitis: Presented for evaluation of abdominal pain and diarrhea.  CT scan with evidence of colitis and esophagitis.  C. difficile PCR and toxin positive.  GI consulted and recommended p.o.  vancomycin with consideration for outpatient FMT  -Vancomycin 4 times daily through 6/9 followed by twice daily until seen by [facility]  - Follow-up with [facility] in 3-4 weeks  - Continue regular 2 g sodium diet  - BMP and CBC 6/6    Acute on chronic anemia  History of GI bleed  Esophagitis: Did receive 1 unit PRBCs 5/28 for hemoglobin 6.8.  Discharge hemoglobin 8.8.  Aspirin for DVT prophylaxis discontinued  - Continue Protonix twice daily  - Repeat CBC 6/6 with iron studies      Fall  Acute comminuted distal left tibia/fibular fracture status post ORIF 5/3/2024:   - Continues NWB at least until next Ortho Follow up 6/25  -Pain control as discussed below  - Aspirin for DVT prophylaxis  - PT/OT/social work.  - Continue vitamin D supplementation:    Acute on Chronic pain Back Pain  H/o Vertebral compression Fx T11-L3: Spoke with son Gael 5/23 x 14 min.  Patient has longstanding history of chronic back pain related to known compression fractures.  Back pain likely limiting therapy participation more than acute tib-fib fracture.    - Continue scheduled Tylenol  - Adjust methocarbamol to 250 mg 3 times daily  - Continue as needed Dilaudid      Hypoxia, subacute  Right Pleural Effusion: Present since initial hospitalization for t/b fx. Unable to wean at TCU. Repeat CXR 5/13 at TCU with pleural effusion and concern for volume overload and started on low dose Lasix. Repeat CT on hospital readmission 6/25 with only trace right pleural effusion and atelectasis  - Suspect hypoxia related to atelectasis and minimal OOB activity  - D/c Lasix and KCL  - Encouarge OOB  - Wean O2 as able      Hypertension:  - Continue Coreg, lisinopril, Isordil, amlodipine and clonidine      CKD stage IIIa: Estimated Creatinine Clearance: 76 mL/min (A) (based on SCr of 0.39 mg/dL (L)).  -BMP 6/6    Cognitive Impairment  MDD  Anxiety  -Continue Zoloft  -Cognition scores at TCU with slums 8/30.  - OT and social work following.  Lives in [place]    This  note was completed in part using Dragon voice recognition software. Although reviewed after completion, some word and grammatical errors may occur.      Electronically signed by:  Olivia Melara PA-C

## 2024-06-04 ENCOUNTER — LAB REQUISITION (OUTPATIENT)
Dept: LAB | Facility: CLINIC | Age: 89
End: 2024-06-04
Payer: COMMERCIAL

## 2024-06-04 DIAGNOSIS — A04.72 ENTEROCOLITIS DUE TO CLOSTRIDIUM DIFFICILE, NOT SPECIFIED AS RECURRENT: ICD-10-CM

## 2024-06-04 DIAGNOSIS — D64.9 ANEMIA, UNSPECIFIED: ICD-10-CM

## 2024-06-04 RX ORDER — METHOCARBAMOL 500 MG/1
250 TABLET, FILM COATED ORAL 3 TIMES DAILY
COMMUNITY
Start: 2024-06-04 | End: 2024-06-19

## 2024-06-06 LAB
ANION GAP SERPL CALCULATED.3IONS-SCNC: 9 MMOL/L (ref 7–15)
BUN SERPL-MCNC: 24.8 MG/DL (ref 8–23)
CALCIUM SERPL-MCNC: 9.3 MG/DL (ref 8.2–9.6)
CHLORIDE SERPL-SCNC: 96 MMOL/L (ref 98–107)
CREAT SERPL-MCNC: 0.68 MG/DL (ref 0.51–0.95)
DEPRECATED HCO3 PLAS-SCNC: 27 MMOL/L (ref 22–29)
EGFRCR SERPLBLD CKD-EPI 2021: 81 ML/MIN/1.73M2
ERYTHROCYTE [DISTWIDTH] IN BLOOD BY AUTOMATED COUNT: 17.2 % (ref 10–15)
FERRITIN SERPL-MCNC: 886 NG/ML (ref 11–328)
GLUCOSE SERPL-MCNC: 87 MG/DL (ref 70–99)
HCT VFR BLD AUTO: 25 % (ref 35–47)
HGB BLD-MCNC: 8.1 G/DL (ref 11.7–15.7)
IRON BINDING CAPACITY (ROCHE): 173 UG/DL (ref 240–430)
IRON SATN MFR SERPL: 29 % (ref 15–46)
IRON SERPL-MCNC: 51 UG/DL (ref 37–145)
MCH RBC QN AUTO: 32.9 PG (ref 26.5–33)
MCHC RBC AUTO-ENTMCNC: 32.4 G/DL (ref 31.5–36.5)
MCV RBC AUTO: 102 FL (ref 78–100)
PLATELET # BLD AUTO: 232 10E3/UL (ref 150–450)
POTASSIUM SERPL-SCNC: 4.3 MMOL/L (ref 3.4–5.3)
RBC # BLD AUTO: 2.46 10E6/UL (ref 3.8–5.2)
SODIUM SERPL-SCNC: 132 MMOL/L (ref 135–145)
WBC # BLD AUTO: 6.2 10E3/UL (ref 4–11)

## 2024-06-06 PROCEDURE — P9604 ONE-WAY ALLOW PRORATED TRIP: HCPCS | Mod: ORL | Performed by: PHYSICIAN ASSISTANT

## 2024-06-06 PROCEDURE — 83550 IRON BINDING TEST: CPT | Mod: ORL | Performed by: PHYSICIAN ASSISTANT

## 2024-06-06 PROCEDURE — 82728 ASSAY OF FERRITIN: CPT | Mod: ORL | Performed by: PHYSICIAN ASSISTANT

## 2024-06-06 PROCEDURE — 85027 COMPLETE CBC AUTOMATED: CPT | Mod: ORL | Performed by: PHYSICIAN ASSISTANT

## 2024-06-06 PROCEDURE — 36415 COLL VENOUS BLD VENIPUNCTURE: CPT | Mod: ORL | Performed by: PHYSICIAN ASSISTANT

## 2024-06-06 PROCEDURE — 80048 BASIC METABOLIC PNL TOTAL CA: CPT | Mod: ORL | Performed by: PHYSICIAN ASSISTANT

## 2024-06-07 ENCOUNTER — TRANSITIONAL CARE UNIT VISIT (OUTPATIENT)
Dept: GERIATRICS | Facility: CLINIC | Age: 89
End: 2024-06-07
Payer: COMMERCIAL

## 2024-06-07 VITALS
WEIGHT: 117.8 LBS | SYSTOLIC BLOOD PRESSURE: 136 MMHG | BODY MASS INDEX: 20.11 KG/M2 | RESPIRATION RATE: 18 BRPM | HEIGHT: 64 IN | HEART RATE: 62 BPM | TEMPERATURE: 98.1 F | OXYGEN SATURATION: 95 % | DIASTOLIC BLOOD PRESSURE: 65 MMHG

## 2024-06-07 DIAGNOSIS — R53.81 PHYSICAL DECONDITIONING: ICD-10-CM

## 2024-06-07 DIAGNOSIS — R09.02 HYPOXIA: ICD-10-CM

## 2024-06-07 DIAGNOSIS — M54.50 CHRONIC LOW BACK PAIN WITHOUT SCIATICA, UNSPECIFIED BACK PAIN LATERALITY: ICD-10-CM

## 2024-06-07 DIAGNOSIS — G89.29 CHRONIC LOW BACK PAIN WITHOUT SCIATICA, UNSPECIFIED BACK PAIN LATERALITY: ICD-10-CM

## 2024-06-07 DIAGNOSIS — N18.31 STAGE 3A CHRONIC KIDNEY DISEASE (H): ICD-10-CM

## 2024-06-07 DIAGNOSIS — I10 BENIGN ESSENTIAL HYPERTENSION: ICD-10-CM

## 2024-06-07 DIAGNOSIS — D62 ABLA (ACUTE BLOOD LOSS ANEMIA): Primary | ICD-10-CM

## 2024-06-07 DIAGNOSIS — A04.72 CLOSTRIDIUM DIFFICILE COLITIS: ICD-10-CM

## 2024-06-07 PROCEDURE — 99309 SBSQ NF CARE MODERATE MDM 30: CPT | Performed by: PHYSICIAN ASSISTANT

## 2024-06-07 RX ORDER — HYDROMORPHONE HYDROCHLORIDE 2 MG/1
2 TABLET ORAL EVERY 6 HOURS PRN
Qty: 20 TABLET | Refills: 0 | Status: ON HOLD | OUTPATIENT
Start: 2024-06-07 | End: 2024-06-24

## 2024-06-07 NOTE — PROGRESS NOTES
"  Chief Complaint   Patient presents with    RECHECK       HPI:  Daya Ignacio is a 89 year old  (10/11/1930), who is being seen today for an episodic care visit at: [facility] ([facility]) [[id number]].     Brief summary: Daya Ignacio is a 89-year-old female with a past medical history of hypertension, chronic back pain, mild cognitive impairment, GERD, previous C. difficile, history of right tib-fib fracture with recent recurrent hospitalizations at [facility].    Initially hospitalized 5/1 - 5/8 after presenting from her longterm with a fall. Found to have a distal tib-fib fracture.  Orthopedics consulted underwent surgical ORIF.  Postoperative course complicated by hypoxia with cough.  Chest x-ray negative.  Treated supportively.  Assessed by therapies and discharged to [facility]    Readmitted 5/25 - 5/31 after presenting from [facility] with abdominal pain and diarrhea.  Found to have significant leukocytosis at 28.  CT scan concerning for colitis.  C. difficile PCR and toxin positive.  GI consulted and recommended p.o. vancomycin and taper but may consider FMT given severity.  Hospital course complicated by acute chronic anemia for which she required a transfusion.  Now discharged back to [facility]      Today's concern is: Spoke with RN. Now off O2. Still up with fortino      Daya is evaluated in her room. Denies SOB. C/o back pain. No diarrhea. No abodminal pain. Eating ok    Review of nursing home EMR:-148,       Allergies, and PMH/PSH reviewed in [facility] today.    REVIEW OF SYSTEMS:  4 point ROS including Respiratory, CV, GI and , other than that noted in the HPI,  is negative    Objective:   /65   Pulse 62   Temp 98.1  F (36.7  C)   Resp 18   Ht 1.626 m (5' 4\")   Wt 53.4 kg (117 lb 12.8 oz)   SpO2 95%   BMI 20.22 kg/m      Physical Exam  Vitals (Facility EMR) reviewed.   Constitutional:       General: She is not in acute distress.  HENT:      Head: Normocephalic and atraumatic.   Eyes:      " General: No scleral icterus.  Cardiovascular:      Rate and Rhythm: Normal rate and regular rhythm.   Pulmonary:      Effort: Pulmonary effort is normal. No respiratory distress.      Breath sounds: No wheezing.   Abdominal:      General: Bowel sounds are normal. There is no distension.      Tenderness: There is no abdominal tenderness.   Musculoskeletal:      Right lower leg: No edema.      Left lower leg: No edema.      Comments: LLE in CAM boot   Skin:     General: Skin is warm and dry.      Findings: No rash.   Neurological:      Mental Status: She is alert. Mental status is at baseline.   Psychiatric:         Behavior: Behavior normal.          MED REC REQUIRED  Post Medication Reconciliation Status: discharge medications reconciled, continue medications without change      Recent labs in [facility] reviewed by me today.  and Most Recent 3 CBC's:  Recent Labs   Lab Test 06/06/24 0548 05/31/24  0800 05/29/24  0736   WBC 6.2 6.2 6.9  6.9   HGB 8.1* 8.8* 8.9*  9.0*   * 99 100  100    212 199  208     Most Recent 3 BMP's:  Recent Labs   Lab Test 06/06/24 0548 05/31/24  0800 05/30/24  0650 05/29/24  0736 05/28/24  0635   * 133*  --   --  136   POTASSIUM 4.3 3.8 3.8   < > 4.1   CHLORIDE 96* 100  --   --  106   CO2 27 23  --   --  20*   BUN 24.8* 8.2  --   --  13.5   CR 0.68 0.39*  --   --  0.44*   ANIONGAP 9 10  --   --  10   DAVE 9.3 8.5  --   --  9.3   GLC 87 91  --   --  85    < > = values in this interval not displayed.     Most Recent 2 LFT's:  Recent Labs   Lab Test 05/28/24  0635 05/26/24  0012   AST 10 16   ALT 5 5   ALKPHOS 179* 289*   BILITOTAL 0.6 0.8     Most Recent Anemia Panel:  Recent Labs   Lab Test 06/06/24  0548 05/31/24  0800 05/29/24  0736 05/28/24  1303 05/28/24  0635   WBC 6.2   < > 6.9  6.9  --  6.5   HGB 8.1*   < > 8.9*  9.0*  --  7.0*   HCT 25.0*   < > 26.7*  27.1*  --  21.8*   *   < > 100  100  --  103*      < > 199  208  --  188   IRON 51  --   --   --    --    IRONSAT 29  --   --   --   --    RETICABSCT  --   --  0.019*  0.021*  --   --    RETP  --   --  0.7  0.8  --   --    *  --   --   --   --    CHINYERE 886*  --   --   --   --    B12  --   --   --   --  >4,000*   FOLIC  --   --   --  13.9  --     < > = values in this interval not displayed.       Assessment/Plan:  C. difficile colitis: Presented for evaluation of abdominal pain and diarrhea.  CT scan with evidence of colitis and esophagitis.  C. difficile PCR and toxin positive.  GI consulted and recommended p.o. vancomycin with consideration for outpatient FMT  -Vancomycin 4 times daily through 6/9 followed by twice daily until seen by [facility]  - Follow-up with [facility] 6/12  - Continue regular 2 g sodium diet  - CBC and BMP 6/6 stable.    Acute on chronic anemia  History of GI bleed  Esophagitis: Did receive 1 unit PRBCs 5/28 for hemoglobin 6.8.  Discharge hemoglobin 8.8.  Aspirin for DVT prophylaxis discontinued  - Continue Protonix twice daily  - Labs 6/6 with stable Hgb at 8.1. Iron studies unremarkabale  - Repeat Hgb 6/13      Fall  Acute comminuted distal left tibia/fibular fracture status post ORIF 5/3/2024:   - Continues NWB at least until next Ortho Follow up 6/25  -Pain control as discussed below  - Aspirin for DVT prophylaxis  - PT/OT/social work.  - Continue vitamin D supplementation:    Acute on Chronic pain Back Pain  H/o Vertebral compression Fx T11-L3: Spoke with son Gael 5/23 x 14 min.  Patient has longstanding history of chronic back pain related to known compression fractures.  Back pain likely limiting therapy participation more than acute tib-fib fracture.    - Continue scheduled Tylenol and low dose methocarbamol  - Continue as needed Dilaudid      Hypoxia, subacute, resolved  Right Pleural Effusion: Present since initial hospitalization for t/b fx. Unable to wean at TCU. Repeat CXR 5/13 at TCU with pleural effusion and concern for volume overload and started on low dose Lasix.  Repeat CT on hospital readmission 6/25 with only trace right pleural effusion and atelectasis. Lasix d/c at TCU  - Suspect hypoxia related to atelectasis and minimal OOB activity. Now resolved  - Continue to encouarge OOB        Hypertension:  - BP appropriate for age  - Continue Coreg, lisinopril, Isordil, amlodipine and clonidine      CKD stage IIIa: Estimated Creatinine Clearance: 43.6 mL/min (based on SCr of 0.68 mg/dL).  -BMP 6/6 with Cr up slightly from baseline. Lasix now d/c  - Repeat 6/13    Cognitive Impairment  MDD  Anxiety  -Continue Zoloft  -Cognition scores at TCU with slums 8/30.  - OT and social work following.  [facility]      Orders:  As above      Electronically signed by: Olivia Melara PA-C

## 2024-06-07 NOTE — LETTER
" 6/7/2024      Daya Ignacio  [address]  [place] [facility] [address]          Chief Complaint   Patient presents with     RECHECK       HPI:  Daya Ignacio is a 89 year old  (10/11/1930), who is being seen today for an episodic care visit at: [facility] ([facility]) [[id number]].     Brief summary: Daya Ignacio is a 89-year-old female with a past medical history of hypertension, chronic back pain, mild cognitive impairment, GERD, previous C. difficile, history of right tib-fib fracture with recent recurrent hospitalizations at [facility].    Initially hospitalized 5/1 - 5/8 after presenting from her [facility] with a fall. Found to have a distal tib-fib fracture.  Orthopedics consulted underwent surgical ORIF.  Postoperative course complicated by hypoxia with cough.  Chest x-ray negative.  Treated supportively.  Assessed by therapies and discharged to U    Readmitted 5/25 - 5/31 after presenting from U with abdominal pain and diarrhea.  Found to have significant leukocytosis at 28.  CT scan concerning for colitis.  C. difficile PCR and toxin positive.  GI consulted and recommended p.o. vancomycin and taper but may consider FMT given severity.  Hospital course complicated by acute chronic anemia for which she required a transfusion.  Now discharged back to TCU      Today's concern is: Spoke with RN. Now off O2. Still up with fortino      Daya is evaluated in her room. Denies SOB. C/o back pain. No diarrhea. No abodminal pain. Eating ok    Review of nursing home EMR:-148,       Allergies, and PMH/PSH reviewed in [employer] today.    REVIEW OF SYSTEMS:  4 point ROS including Respiratory, CV, GI and , other than that noted in the HPI,  is negative    Objective:   /65   Pulse 62   Temp 98.1  F (36.7  C)   Resp 18   Ht 1.626 m (5' 4\")   Wt 53.4 kg (117 lb 12.8 oz)   SpO2 95%   BMI 20.22 kg/m      Physical Exam  Vitals (Facility EMR) reviewed.   Constitutional:       General: She " is not in acute distress.  HENT:      Head: Normocephalic and atraumatic.   Eyes:      General: No scleral icterus.  Cardiovascular:      Rate and Rhythm: Normal rate and regular rhythm.   Pulmonary:      Effort: Pulmonary effort is normal. No respiratory distress.      Breath sounds: No wheezing.   Abdominal:      General: Bowel sounds are normal. There is no distension.      Tenderness: There is no abdominal tenderness.   Musculoskeletal:      Right lower leg: No edema.      Left lower leg: No edema.      Comments: LLE in CAM boot   Skin:     General: Skin is warm and dry.      Findings: No rash.   Neurological:      Mental Status: She is alert. Mental status is at baseline.   Psychiatric:         Behavior: Behavior normal.          MED REC REQUIRED  Post Medication Reconciliation Status: discharge medications reconciled, continue medications without change      Recent labs in [facility] reviewed by me today.  and Most Recent 3 CBC's:  Recent Labs   Lab Test 06/06/24 0548 05/31/24  0800 05/29/24  0736   WBC 6.2 6.2 6.9  6.9   HGB 8.1* 8.8* 8.9*  9.0*   * 99 100  100    212 199  208     Most Recent 3 BMP's:  Recent Labs   Lab Test 06/06/24  0548 05/31/24  0800 05/30/24  0650 05/29/24  0736 05/28/24  0635   * 133*  --   --  136   POTASSIUM 4.3 3.8 3.8   < > 4.1   CHLORIDE 96* 100  --   --  106   CO2 27 23  --   --  20*   BUN 24.8* 8.2  --   --  13.5   CR 0.68 0.39*  --   --  0.44*   ANIONGAP 9 10  --   --  10   DAVE 9.3 8.5  --   --  9.3   GLC 87 91  --   --  85    < > = values in this interval not displayed.     Most Recent 2 LFT's:  Recent Labs   Lab Test 05/28/24  0635 05/26/24  0012   AST 10 16   ALT 5 5   ALKPHOS 179* 289*   BILITOTAL 0.6 0.8     Most Recent Anemia Panel:  Recent Labs   Lab Test 06/06/24  0548 05/31/24  0800 05/29/24  0736 05/28/24  1303 05/28/24  0635   WBC 6.2   < > 6.9  6.9  --  6.5   HGB 8.1*   < > 8.9*  9.0*  --  7.0*   HCT 25.0*   < > 26.7*  27.1*  --  21.8*   MCV  102*   < > 100  100  --  103*      < > 199  208  --  188   IRON 51  --   --   --   --    IRONSAT 29  --   --   --   --    RETICABSCT  --   --  0.019*  0.021*  --   --    RETP  --   --  0.7  0.8  --   --    *  --   --   --   --    CHINYERE 886*  --   --   --   --    B12  --   --   --   --  >4,000*   FOLIC  --   --   --  13.9  --     < > = values in this interval not displayed.       Assessment/Plan:  C. difficile colitis: Presented for evaluation of abdominal pain and diarrhea.  CT scan with evidence of colitis and esophagitis.  C. difficile PCR and toxin positive.  GI consulted and recommended p.o. vancomycin with consideration for outpatient FMT  -Vancomycin 4 times daily through 6/9 followed by twice daily until seen by [facility]  - Follow-up with [facility] 6/12  - Continue regular 2 g sodium diet  - CBC and BMP 6/6 stable.    Acute on chronic anemia  History of GI bleed  Esophagitis: Did receive 1 unit PRBCs 5/28 for hemoglobin 6.8.  Discharge hemoglobin 8.8.  Aspirin for DVT prophylaxis discontinued  - Continue Protonix twice daily  - Labs 6/6 with stable Hgb at 8.1. Iron studies unremarkabale  - Repeat Hgb 6/13      Fall  Acute comminuted distal left tibia/fibular fracture status post ORIF 5/3/2024:   - Continues NWB at least until next Ortho Follow up 6/25  -Pain control as discussed below  - Aspirin for DVT prophylaxis  - PT/OT/social work.  - Continue vitamin D supplementation:    Acute on Chronic pain Back Pain  H/o Vertebral compression Fx T11-L3: Spoke with son Gael 5/23 x 14 min.  Patient has longstanding history of chronic back pain related to known compression fractures.  Back pain likely limiting therapy participation more than acute tib-fib fracture.    - Continue scheduled Tylenol and low dose methocarbamol  - Continue as needed Dilaudid      Hypoxia, subacute, resolved  Right Pleural Effusion: Present since initial hospitalization for t/b fx. Unable to wean at TCU. Repeat CXR 5/13 at  TCU with pleural effusion and concern for volume overload and started on low dose Lasix. Repeat CT on hospital readmission 6/25 with only trace right pleural effusion and atelectasis. Lasix d/c at TCU  - Suspect hypoxia related to atelectasis and minimal OOB activity. Now resolved  - Continue to encouarge OOB        Hypertension:  - BP appropriate for age  - Continue Coreg, lisinopril, Isordil, amlodipine and clonidine      CKD stage IIIa: Estimated Creatinine Clearance: 43.6 mL/min (based on SCr of 0.68 mg/dL).  -BMP 6/6 with Cr up slightly from baseline. Lasix now d/c  - Repeat 6/13    Cognitive Impairment  MDD  Anxiety  -Continue Zoloft  -Cognition scores at TCU with slums 8/30.  - OT and social work following.  Lives in [facility]      Orders:  As above      Electronically signed by: Olivia Melara PA-C       Sincerely,        Olivia Melara PA-C

## 2024-06-12 ENCOUNTER — TRANSITIONAL CARE UNIT VISIT (OUTPATIENT)
Dept: GERIATRICS | Facility: CLINIC | Age: 89
End: 2024-06-12
Payer: COMMERCIAL

## 2024-06-12 ENCOUNTER — TRANSFERRED RECORDS (OUTPATIENT)
Dept: HEALTH INFORMATION MANAGEMENT | Facility: CLINIC | Age: 89
End: 2024-06-12

## 2024-06-12 ENCOUNTER — LAB REQUISITION (OUTPATIENT)
Dept: LAB | Facility: CLINIC | Age: 89
End: 2024-06-12
Payer: COMMERCIAL

## 2024-06-12 VITALS
OXYGEN SATURATION: 94 % | HEART RATE: 61 BPM | SYSTOLIC BLOOD PRESSURE: 124 MMHG | TEMPERATURE: 97.8 F | WEIGHT: 103.9 LBS | DIASTOLIC BLOOD PRESSURE: 54 MMHG | BODY MASS INDEX: 17.74 KG/M2 | RESPIRATION RATE: 17 BRPM | HEIGHT: 64 IN

## 2024-06-12 DIAGNOSIS — G89.29 CHRONIC LOW BACK PAIN WITHOUT SCIATICA, UNSPECIFIED BACK PAIN LATERALITY: ICD-10-CM

## 2024-06-12 DIAGNOSIS — E46 MALNUTRITION, UNSPECIFIED TYPE (H): ICD-10-CM

## 2024-06-12 DIAGNOSIS — R62.7 FAILURE TO THRIVE IN ADULT: ICD-10-CM

## 2024-06-12 DIAGNOSIS — K59.03 DRUG-INDUCED CONSTIPATION: Primary | ICD-10-CM

## 2024-06-12 DIAGNOSIS — A04.72 CLOSTRIDIUM DIFFICILE COLITIS: ICD-10-CM

## 2024-06-12 DIAGNOSIS — N18.9 CHRONIC KIDNEY DISEASE, UNSPECIFIED: ICD-10-CM

## 2024-06-12 DIAGNOSIS — M54.50 CHRONIC LOW BACK PAIN WITHOUT SCIATICA, UNSPECIFIED BACK PAIN LATERALITY: ICD-10-CM

## 2024-06-12 PROCEDURE — 99309 SBSQ NF CARE MODERATE MDM 30: CPT | Performed by: PHYSICIAN ASSISTANT

## 2024-06-12 RX ORDER — SENNA AND DOCUSATE SODIUM 50; 8.6 MG/1; MG/1
1 TABLET, FILM COATED ORAL AT BEDTIME
Status: SHIPPED
Start: 2024-06-12 | End: 2024-08-15

## 2024-06-12 NOTE — PROGRESS NOTES
Chief Complaint   Patient presents with    RECHECK       HPI:  Daya Ignacio is a 89 year old  (10/11/1930), who is being seen today for an episodic care visit at: [facility] ([facility]) [[id number]].     Brief summary:  Daya Ignacio is a 89-year-old female with a past medical history of hypertension, chronic back pain, mild cognitive impairment, GERD, previous C. difficile, history of right tib-fib fracture with recent recurrent hospitalizations at [facility].    Initially hospitalized 5/1 - 5/8 after presenting from her retirement with a fall. Found to have a distal tib-fib fracture.  Orthopedics consulted underwent surgical ORIF.  Postoperative course complicated by hypoxia with cough.  Chest x-ray negative.  Treated supportively.  Assessed by therapies and discharged to [facility]    Readmitted 5/25 - 5/31 after presenting from [facility] with abdominal pain and diarrhea.  Found to have significant leukocytosis at 28.  CT scan concerning for colitis.  C. difficile PCR and toxin positive.  GI consulted and recommended p.o. vancomycin and taper but may consider FMT given severity.  Hospital course complicated by acute chronic anemia for which she required a transfusion.  Now discharged back to U    Today's concern is: Has GI follow up today. Remains off O2. C/o RLQ abdominal pain. Nursing documenting BMs 1 every 3-4 days. RN states she does not like Miralax or Prune juice. Poor participation in therapy. Weights trending down      Review of nursing home EMR:-135, Wt 103.9    Wt Readings from Last 4 Encounters:   06/12/24 47.1 kg (103 lb 14.4 oz)   06/07/24 53.4 kg (117 lb 12.8 oz)   06/03/24 53.4 kg (117 lb 12.8 oz)   05/26/24 53.7 kg (118 lb 6.2 oz)        Allergies, and PMH/PSH reviewed in EPIC today.    REVIEW OF SYSTEMS:  4 point ROS including Respiratory, CV, GI and , other than that noted in the HPI,  is negative    Objective:   /54   Pulse 61   Temp 97.8  F (36.6  C)   Resp 17   Ht 1.626 m  "(5' 4\")   Wt 47.1 kg (103 lb 14.4 oz)   SpO2 94%   BMI 17.83 kg/m      Physical Exam  Cardiovascular:      Rate and Rhythm: Normal rate and regular rhythm.   Pulmonary:      Effort: No respiratory distress.      Breath sounds: No wheezing.   Abdominal:      General: Bowel sounds are normal. There is no distension.      Comments: Mild right sided tenderness          MED REC REQUIRED  Post Medication Reconciliation Status: discharge medications reconciled and changed, per note/orders      Recent labs in [facility] reviewed by me today.  and Most Recent 3 CBC's:  Recent Labs   Lab Test 06/06/24  0548 05/31/24  0800 05/29/24  0736   WBC 6.2 6.2 6.9  6.9   HGB 8.1* 8.8* 8.9*  9.0*   * 99 100  100    212 199  208     Most Recent 3 BMP's:  Recent Labs   Lab Test 06/06/24  0548 05/31/24  0800 05/30/24  0650 05/29/24  0736 05/28/24  0635   * 133*  --   --  136   POTASSIUM 4.3 3.8 3.8   < > 4.1   CHLORIDE 96* 100  --   --  106   CO2 27 23  --   --  20*   BUN 24.8* 8.2  --   --  13.5   CR 0.68 0.39*  --   --  0.44*   ANIONGAP 9 10  --   --  10   DAVE 9.3 8.5  --   --  9.3   GLC 87 91  --   --  85    < > = values in this interval not displayed.     Most Recent 2 LFT's:  Recent Labs   Lab Test 05/28/24  0635 05/26/24  0012   AST 10 16   ALT 5 5   ALKPHOS 179* 289*   BILITOTAL 0.6 0.8       Assessment/Plan:  C. difficile colitis: Presented for evaluation of abdominal pain and diarrhea.  CT scan with evidence of colitis and esophagitis.  C. difficile PCR and toxin positive.  GI consulted and recommended p.o. vancomycin with consideration for outpatient FMT  -Vancomycin 4 times daily through 6/9 followed by twice daily until seen by [facility]  - Still c/o of right sided abdominal pain. No diarrhea, actually may be a bit constipation. Start Senna S 1 tablet at HS  - Follow-up with [facility] later today  - Continue regular 2 g sodium diet  - CBC and BMP scheduled 6/13    Acute on chronic anemia  History of GI " bleed  Esophagitis: Did receive 1 unit PRBCs 5/28 for hemoglobin 6.8.  Discharge hemoglobin 8.8.  Aspirin for DVT prophylaxis discontinued  - Continue Protonix twice daily  - Labs 6/6 with stable Hgb at 8.1. Iron studies unremarkabale  - Repeat Hgb  scheduled 6/13      Fall  Acute comminuted distal left tibia/fibular fracture status post ORIF 5/3/2024:   - Continues NWB at least until next Ortho Follow up 6/25  -Pain control as discussed below  - Aspirin for DVT prophylaxis  - PT/OT/social work.  - Continue vitamin D supplementation:    Acute on Chronic pain Back Pain  H/o Vertebral compression Fx T11-L3: Spoke with son Gael 5/23 x 14 min.  Patient has longstanding history of chronic back pain related to known compression fractures.  Back pain likely limiting therapy participation more than acute tib-fib fracture.    - Continue scheduled Tylenol and low dose methocarbamol  - Continue as needed Dilaudid      Hypoxia, subacute, resolved  Right Pleural Effusion: Present since initial hospitalization for t/b fx. Unable to wean at TCU. Repeat CXR 5/13 at TCU with pleural effusion and concern for volume overload and started on low dose Lasix. Repeat CT on hospital readmission 6/25 with only trace right pleural effusion and atelectasis. Lasix d/c at TCU  - Suspect hypoxia related to atelectasis and minimal OOB activity. Now resolved  - Continue to encouarge OOB      Hypertension:  - BP appropriate for age  - Continue Coreg, lisinopril, Isordil, amlodipine and clonidine      CKD stage IIIa: Estimated Creatinine Clearance: 43.6 mL/min (based on SCr of 0.68 mg/dL).  -BMP 6/6 with Cr up slightly from baseline. Lasix now d/c  - Repeat  scheduled 6/13    Cognitive Impairment  MDD  Anxiety  -Continue Zoloft  -Cognition scores at TCU with slums 8/30.  - OT and social work following.  Lives in care home    Malnutrition  Failure to Thrive: Nursing notes poor appetite. Weights trending down in the setting or recurrent hospitalization,  recent Cdiff and dementia  - Facility nutritionist following        Orders:  As above      Electronically signed by: Olivia Melraa PA-C

## 2024-06-12 NOTE — LETTER
6/12/2024      Daya Ignacio  [address]  [place] [address]          Chief Complaint   Patient presents with     RECHECK       HPI:  Daya Ignacio is a 89 year old  (10/11/1930), who is being seen today for an episodic care visit at: [facility] ([facility]) [[id number]].     Brief summary:  Daya Ignacio is a 89-year-old female with a past medical history of hypertension, chronic back pain, mild cognitive impairment, GERD, previous C. difficile, history of right tib-fib fracture with recent recurrent hospitalizations at [facility].    Initially hospitalized 5/1 - 5/8 after presenting from her custodial with a fall. Found to have a distal tib-fib fracture.  Orthopedics consulted underwent surgical ORIF.  Postoperative course complicated by hypoxia with cough.  Chest x-ray negative.  Treated supportively.  Assessed by therapies and discharged to U    Readmitted 5/25 - 5/31 after presenting from U with abdominal pain and diarrhea.  Found to have significant leukocytosis at 28.  CT scan concerning for colitis.  C. difficile PCR and toxin positive.  GI consulted and recommended p.o. vancomycin and taper but may consider FMT given severity.  Hospital course complicated by acute chronic anemia for which she required a transfusion.  Now discharged back to U    Today's concern is: Has GI follow up today. Remains off O2. C/o RLQ abdominal pain. Nursing documenting BMs 1 every 3-4 days. RN states she does not like Miralax or Prune juice. Poor participation in therapy. Weights trending down      Review of nursing home EMR:-135, Wt 103.9    Wt Readings from Last 4 Encounters:   06/12/24 47.1 kg (103 lb 14.4 oz)   06/07/24 53.4 kg (117 lb 12.8 oz)   06/03/24 53.4 kg (117 lb 12.8 oz)   05/26/24 53.7 kg (118 lb 6.2 oz)        Allergies, and PMH/PSH reviewed in EPIC today.    REVIEW OF SYSTEMS:  4 point ROS including Respiratory, CV, GI and , other than that noted in the HPI,  is  "negative    Objective:   /54   Pulse 61   Temp 97.8  F (36.6  C)   Resp 17   Ht 1.626 m (5' 4\")   Wt 47.1 kg (103 lb 14.4 oz)   SpO2 94%   BMI 17.83 kg/m      Physical Exam  Cardiovascular:      Rate and Rhythm: Normal rate and regular rhythm.   Pulmonary:      Effort: No respiratory distress.      Breath sounds: No wheezing.   Abdominal:      General: Bowel sounds are normal. There is no distension.      Comments: Mild right sided tenderness          MED REC REQUIRED  Post Medication Reconciliation Status: discharge medications reconciled and changed, per note/orders      Recent labs in [facility] reviewed by me today.  and Most Recent 3 CBC's:  Recent Labs   Lab Test 06/06/24  0548 05/31/24  0800 05/29/24  0736   WBC 6.2 6.2 6.9  6.9   HGB 8.1* 8.8* 8.9*  9.0*   * 99 100  100    212 199  208     Most Recent 3 BMP's:  Recent Labs   Lab Test 06/06/24  0548 05/31/24  0800 05/30/24  0650 05/29/24  0736 05/28/24  0635   * 133*  --   --  136   POTASSIUM 4.3 3.8 3.8   < > 4.1   CHLORIDE 96* 100  --   --  106   CO2 27 23  --   --  20*   BUN 24.8* 8.2  --   --  13.5   CR 0.68 0.39*  --   --  0.44*   ANIONGAP 9 10  --   --  10   DAVE 9.3 8.5  --   --  9.3   GLC 87 91  --   --  85    < > = values in this interval not displayed.     Most Recent 2 LFT's:  Recent Labs   Lab Test 05/28/24  0635 05/26/24  0012   AST 10 16   ALT 5 5   ALKPHOS 179* 289*   BILITOTAL 0.6 0.8       Assessment/Plan:  C. difficile colitis: Presented for evaluation of abdominal pain and diarrhea.  CT scan with evidence of colitis and esophagitis.  C. difficile PCR and toxin positive.  GI consulted and recommended p.o. vancomycin with consideration for outpatient FMT  -Vancomycin 4 times daily through 6/9 followed by twice daily until seen by [facility]  - Still c/o of right sided abdominal pain. No diarrhea, actually may be a bit constipation. Start Senna S 1 tablet at HS  - Follow-up with [facility] later today  - Continue " regular 2 g sodium diet  - CBC and BMP scheduled 6/13    Acute on chronic anemia  History of GI bleed  Esophagitis: Did receive 1 unit PRBCs 5/28 for hemoglobin 6.8.  Discharge hemoglobin 8.8.  Aspirin for DVT prophylaxis discontinued  - Continue Protonix twice daily  - Labs 6/6 with stable Hgb at 8.1. Iron studies unremarkabale  - Repeat Hgb  scheduled 6/13      Fall  Acute comminuted distal left tibia/fibular fracture status post ORIF 5/3/2024:   - Continues NWB at least until next Ortho Follow up 6/25  -Pain control as discussed below  - Aspirin for DVT prophylaxis  - PT/OT/social work.  - Continue vitamin D supplementation:    Acute on Chronic pain Back Pain  H/o Vertebral compression Fx T11-L3: Spoke with son Gael 5/23 x 14 min.  Patient has longstanding history of chronic back pain related to known compression fractures.  Back pain likely limiting therapy participation more than acute tib-fib fracture.    - Continue scheduled Tylenol and low dose methocarbamol  - Continue as needed Dilaudid      Hypoxia, subacute, resolved  Right Pleural Effusion: Present since initial hospitalization for t/b fx. Unable to wean at TCU. Repeat CXR 5/13 at TCU with pleural effusion and concern for volume overload and started on low dose Lasix. Repeat CT on hospital readmission 6/25 with only trace right pleural effusion and atelectasis. Lasix d/c at TCU  - Suspect hypoxia related to atelectasis and minimal OOB activity. Now resolved  - Continue to encouarge OOB      Hypertension:  - BP appropriate for age  - Continue Coreg, lisinopril, Isordil, amlodipine and clonidine      CKD stage IIIa: Estimated Creatinine Clearance: 43.6 mL/min (based on SCr of 0.68 mg/dL).  -BMP 6/6 with Cr up slightly from baseline. Lasix now d/c  - Repeat  scheduled 6/13    Cognitive Impairment  MDD  Anxiety  -Continue Zoloft  -Cognition scores at TCU with slums 8/30.  - OT and social work following.  Lives in [place]    Malnutrition  Failure to Thrive:  Nursing notes poor appetite. Weights trending down in the setting or recurrent hospitalization, recent Cdiff and dementia  - Facility nutritionist following        Orders:  As above      Electronically signed by: Olivia Melara PA-C       Sincerely,        TOMAS RouseC

## 2024-06-13 LAB
ANION GAP SERPL CALCULATED.3IONS-SCNC: 7 MMOL/L (ref 7–15)
BUN SERPL-MCNC: 20.9 MG/DL (ref 8–23)
CALCIUM SERPL-MCNC: 10 MG/DL (ref 8.2–9.6)
CHLORIDE SERPL-SCNC: 101 MMOL/L (ref 98–107)
CREAT SERPL-MCNC: 0.51 MG/DL (ref 0.51–0.95)
DEPRECATED HCO3 PLAS-SCNC: 25 MMOL/L (ref 22–29)
EGFRCR SERPLBLD CKD-EPI 2021: 87 ML/MIN/1.73M2
ERYTHROCYTE [DISTWIDTH] IN BLOOD BY AUTOMATED COUNT: 17 % (ref 10–15)
GLUCOSE SERPL-MCNC: 88 MG/DL (ref 70–99)
HCT VFR BLD AUTO: 23.3 % (ref 35–47)
HGB BLD-MCNC: 7.7 G/DL (ref 11.7–15.7)
MCH RBC QN AUTO: 33.3 PG (ref 26.5–33)
MCHC RBC AUTO-ENTMCNC: 33 G/DL (ref 31.5–36.5)
MCV RBC AUTO: 101 FL (ref 78–100)
PLATELET # BLD AUTO: 217 10E3/UL (ref 150–450)
POTASSIUM SERPL-SCNC: 4.6 MMOL/L (ref 3.4–5.3)
RBC # BLD AUTO: 2.31 10E6/UL (ref 3.8–5.2)
SODIUM SERPL-SCNC: 133 MMOL/L (ref 135–145)
WBC # BLD AUTO: 4.9 10E3/UL (ref 4–11)

## 2024-06-13 PROCEDURE — 85027 COMPLETE CBC AUTOMATED: CPT | Mod: ORL | Performed by: PHYSICIAN ASSISTANT

## 2024-06-13 PROCEDURE — P9603 ONE-WAY ALLOW PRORATED MILES: HCPCS | Mod: ORL | Performed by: PHYSICIAN ASSISTANT

## 2024-06-13 PROCEDURE — 80048 BASIC METABOLIC PNL TOTAL CA: CPT | Mod: ORL | Performed by: PHYSICIAN ASSISTANT

## 2024-06-13 PROCEDURE — 36415 COLL VENOUS BLD VENIPUNCTURE: CPT | Mod: ORL | Performed by: PHYSICIAN ASSISTANT

## 2024-06-19 ENCOUNTER — TRANSITIONAL CARE UNIT VISIT (OUTPATIENT)
Dept: GERIATRICS | Facility: CLINIC | Age: 89
End: 2024-06-19
Payer: COMMERCIAL

## 2024-06-19 ENCOUNTER — TELEPHONE (OUTPATIENT)
Dept: GERIATRICS | Facility: CLINIC | Age: 89
End: 2024-06-19

## 2024-06-19 VITALS
HEART RATE: 62 BPM | BODY MASS INDEX: 17.69 KG/M2 | OXYGEN SATURATION: 94 % | WEIGHT: 103.6 LBS | RESPIRATION RATE: 18 BRPM | SYSTOLIC BLOOD PRESSURE: 125 MMHG | DIASTOLIC BLOOD PRESSURE: 63 MMHG | TEMPERATURE: 98.6 F | HEIGHT: 64 IN

## 2024-06-19 DIAGNOSIS — E46 MALNUTRITION, UNSPECIFIED TYPE (H): ICD-10-CM

## 2024-06-19 DIAGNOSIS — M54.50 CHRONIC LOW BACK PAIN WITHOUT SCIATICA, UNSPECIFIED BACK PAIN LATERALITY: ICD-10-CM

## 2024-06-19 DIAGNOSIS — I10 BENIGN ESSENTIAL HYPERTENSION: ICD-10-CM

## 2024-06-19 DIAGNOSIS — A04.72 CLOSTRIDIUM DIFFICILE COLITIS: Primary | ICD-10-CM

## 2024-06-19 DIAGNOSIS — D62 ABLA (ACUTE BLOOD LOSS ANEMIA): ICD-10-CM

## 2024-06-19 DIAGNOSIS — G89.29 CHRONIC LOW BACK PAIN WITHOUT SCIATICA, UNSPECIFIED BACK PAIN LATERALITY: ICD-10-CM

## 2024-06-19 DIAGNOSIS — Z87.81 H/O COMPRESSION FRACTURE OF SPINE: ICD-10-CM

## 2024-06-19 PROCEDURE — 99309 SBSQ NF CARE MODERATE MDM 30: CPT | Performed by: PHYSICIAN ASSISTANT

## 2024-06-19 RX ORDER — AMLODIPINE BESYLATE 10 MG/1
5 TABLET ORAL DAILY
COMMUNITY
Start: 2024-06-19 | End: 2024-06-21

## 2024-06-19 RX ORDER — METHOCARBAMOL 500 MG/1
250 TABLET, FILM COATED ORAL 3 TIMES DAILY PRN
COMMUNITY
Start: 2024-06-19 | End: 2024-07-09

## 2024-06-19 NOTE — LETTER
6/19/2024      Daya Ignacio  [address]  [place] [address]          Chief Complaint   Patient presents with     Nursing Home Acute       HPI:  Daya Ignacio is a 89 year old  (10/11/1930), who is being seen today for an episodic care visit at: [facility] ([facility]) [[id number]].     Brief summary:  Daya Ignacio is a 89-year-old female with a past medical history of hypertension, chronic back pain, mild cognitive impairment, GERD, previous C. difficile, history of right tib-fib fracture with recent recurrent hospitalizations at [facility].    Initially hospitalized 5/1 - 5/8 after presenting from her [facility] with a fall. Found to have a distal tib-fib fracture.  Orthopedics consulted underwent surgical ORIF.  Postoperative course complicated by hypoxia with cough.  Chest x-ray negative.  Treated supportively.  Assessed by therapies and discharged to U    Readmitted 5/25 - 5/31 after presenting from U with abdominal pain and diarrhea.  Found to have significant leukocytosis at 28.  CT scan concerning for colitis.  C. difficile PCR and toxin positive.  GI consulted and recommended p.o. vancomycin and taper but may consider FMT given severity.  Hospital course complicated by acute chronic anemia for which she required a transfusion.  Now discharged back to TCU    Today's concern is: Spoke with RN and aide.  Recently has been doing well.  Does not seem in pain.  Not complaining of pain with cares/brief changes.  Oral intake improving a bit.    Notes from recent GI visit reviewed.  Working on getting FMT overed by insurance.  In the interim she is post to stay on daily vancomycin without an end date    Daya is evaluated in her room.  Does state she has some back pain but admits this is chronic and not particularly debilitating at this time.  Denies abdominal pain.  Moving bowels.  Feels she is eating okay.  Mention she does not love the food.  Makes a joke that she did eat  "better if they served throwing steak.    Review of nursing home EMR:SBP , Wt 103.9    Wt Readings from Last 4 Encounters:   06/19/24 47 kg (103 lb 9.6 oz)   06/12/24 47.1 kg (103 lb 14.4 oz)   06/07/24 53.4 kg (117 lb 12.8 oz)   06/03/24 53.4 kg (117 lb 12.8 oz)            Allergies, and PMH/PSH reviewed in [facility] today.    REVIEW OF SYSTEMS:  4 point ROS including Respiratory, CV, GI and , other than that noted in the HPI,  is negative    Objective:   /63   Pulse 62   Temp 98.6  F (37  C)   Resp 18   Ht 1.626 m (5' 4\")   Wt 47 kg (103 lb 9.6 oz)   SpO2 94%   BMI 17.78 kg/m      Physical Exam  Vitals (Facility EMR) reviewed.   Constitutional:       General: She is not in acute distress.  HENT:      Head: Normocephalic and atraumatic.   Eyes:      General: No scleral icterus.  Cardiovascular:      Rate and Rhythm: Normal rate and regular rhythm.      Heart sounds: No murmur heard.  Pulmonary:      Effort: Pulmonary effort is normal.      Breath sounds: No wheezing.   Abdominal:      Tenderness: There is no abdominal tenderness.   Musculoskeletal:      Right lower leg: No edema.      Left lower leg: No edema.   Skin:     General: Skin is warm and dry.      Findings: No rash.   Neurological:      Mental Status: She is alert. Mental status is at baseline.   Psychiatric:         Behavior: Behavior normal.          MED REC REQUIRED  Post Medication Reconciliation Status: discharge medications reconciled and changed, per note/orders      Recent labs in [facility] reviewed by me today.  and Most Recent 3 CBC's:  Recent Labs   Lab Test 06/13/24  0559 06/06/24  0548 05/31/24  0800   WBC 4.9 6.2 6.2   HGB 7.7* 8.1* 8.8*   * 102* 99    232 212     Most Recent 3 BMP's:  Recent Labs   Lab Test 06/13/24  0559 06/06/24  0548 05/31/24  0800   * 132* 133*   POTASSIUM 4.6 4.3 3.8   CHLORIDE 101 96* 100   CO2 25 27 23   BUN 20.9 24.8* 8.2   CR 0.51 0.68 0.39*   ANIONGAP 7 9 10   DAVE 10.0* 9.3 8.5   GLC " 88 87 91     Most Recent 2 LFT's:  Recent Labs   Lab Test 05/28/24  0635 05/26/24  0012   AST 10 16   ALT 5 5   ALKPHOS 179* 289*   BILITOTAL 0.6 0.8       Assessment/Plan:  C. difficile colitis: Presented for evaluation of abdominal pain and diarrhea.  CT scan with evidence of colitis and esophagitis.  C. difficile PCR and toxin positive.  GI consulted and recommended p.o. vancomycin with consideration for outpatient FMT  -Recently had [facility] follow up.  Planning to continue suppressive vancomycin. [facility] working on getting FMT covered by insurance  - Continue regular diet    Acute on chronic anemia  History of GI bleed  Esophagitis: Did receive 1 unit PRBCs 5/28 for hemoglobin 6.8.  Discharge hemoglobin 8.8.  Aspirin for DVT prophylaxis discontinued  - Continue Protonix twice daily  - Hemoglobin 6/13 down slightly to 7.7.  No active signs of bleeding  - Repeat hemoglobin 6/24      Fall  Acute comminuted distal left tibia/fibular fracture status post ORIF 5/3/2024:   - Continues NWB at least until next Ortho Follow up 6/25  -Pain control as discussed below  - PT/OT/social work.  - Continue vitamin D supplementation:    Acute on Chronic pain Back Pain  H/o Vertebral compression Fx T11-L3: Chronic back pain in the setting of previous compression fractures.  - Overall seems improved today.  Complains of minimal pain  - Continue scheduled Tylenol  - Adjust methocarbamol to 3 times daily as needed to limit sedation  - Continue as needed Dilaudid      Hypertension: SBP is occasionally in the 90s.  Denies dizziness  - Reduce amlodipine to 5 mg daily  - Continue Coreg, lisinopril, Isordil, and clonidine      CKD stage IIIa: Estimated Creatinine Clearance: Estimated Creatinine Clearance: 51.1 mL/min (based on SCr of 0.51 mg/dL).  -Recent BMP within baseline  - Monitor as clinically appropriate    Cognitive Impairment  MDD  Anxiety  -Continue Zoloft  -Cognition scores at TCU with slums 8/30.  - OT and social work following.   Lives in [facility]    Malnutrition  Failure to Thrive: Has had significant weight loss since admission to TCU.  Not unexpected given dementia as well as hospitalization for C. difficile colitis.    -Recently nursing staff has noted improvement in oral intake so hopefully weights will slowly begin to trend up  - Facility nutritionist following    Orders:  As above      Electronically signed by: Olivia Melara PA-C       Sincerely,        Olivia Melara PA-C

## 2024-06-19 NOTE — PROGRESS NOTES
Chief Complaint   Patient presents with    Nursing Home Acute       HPI:  Daya Ignacio is a 89 year old  (10/11/1930), who is being seen today for an episodic care visit at: [facility] ([facility]) [[id number]].     Brief summary:  Daya Ignacio is a 89-year-old female with a past medical history of hypertension, chronic back pain, mild cognitive impairment, GERD, previous C. difficile, history of right tib-fib fracture with recent recurrent hospitalizations at [facility].    Initially hospitalized 5/1 - 5/8 after presenting from her [facility] with a fall. Found to have a distal tib-fib fracture.  Orthopedics consulted underwent surgical ORIF.  Postoperative course complicated by hypoxia with cough.  Chest x-ray negative.  Treated supportively.  Assessed by therapies and discharged to U    Readmitted 5/25 - 5/31 after presenting from U with abdominal pain and diarrhea.  Found to have significant leukocytosis at 28.  CT scan concerning for colitis.  C. difficile PCR and toxin positive.  GI consulted and recommended p.o. vancomycin and taper but may consider FMT given severity.  Hospital course complicated by acute chronic anemia for which she required a transfusion.  Now discharged back to U    Today's concern is: Spoke with RN and aide.  Recently has been doing well.  Does not seem in pain.  Not complaining of pain with cares/brief changes.  Oral intake improving a bit.    Notes from recent GI visit reviewed.  Working on getting FMT overed by insurance.  In the interim she is post to stay on daily vancomycin without an end date    Daya is evaluated in her room.  Does state she has some back pain but admits this is chronic and not particularly debilitating at this time.  Denies abdominal pain.  Moving bowels.  Feels she is eating okay.  Mention she does not love the food.  Makes a joke that she did eat better if they served throwing steak.    Review of nursing home EMR:SBP , Wt 103.9    Wt  "Readings from Last 4 Encounters:   06/19/24 47 kg (103 lb 9.6 oz)   06/12/24 47.1 kg (103 lb 14.4 oz)   06/07/24 53.4 kg (117 lb 12.8 oz)   06/03/24 53.4 kg (117 lb 12.8 oz)            Allergies, and PMH/PSH reviewed in [facility] today.    REVIEW OF SYSTEMS:  4 point ROS including Respiratory, CV, GI and , other than that noted in the HPI,  is negative    Objective:   /63   Pulse 62   Temp 98.6  F (37  C)   Resp 18   Ht 1.626 m (5' 4\")   Wt 47 kg (103 lb 9.6 oz)   SpO2 94%   BMI 17.78 kg/m      Physical Exam  Vitals (Facility EMR) reviewed.   Constitutional:       General: She is not in acute distress.  HENT:      Head: Normocephalic and atraumatic.   Eyes:      General: No scleral icterus.  Cardiovascular:      Rate and Rhythm: Normal rate and regular rhythm.      Heart sounds: No murmur heard.  Pulmonary:      Effort: Pulmonary effort is normal.      Breath sounds: No wheezing.   Abdominal:      Tenderness: There is no abdominal tenderness.   Musculoskeletal:      Right lower leg: No edema.      Left lower leg: No edema.   Skin:     General: Skin is warm and dry.      Findings: No rash.   Neurological:      Mental Status: She is alert. Mental status is at baseline.   Psychiatric:         Behavior: Behavior normal.          MED REC REQUIRED  Post Medication Reconciliation Status: discharge medications reconciled and changed, per note/orders      Recent labs in [facility] reviewed by me today.  and Most Recent 3 CBC's:  Recent Labs   Lab Test 06/13/24  0559 06/06/24  0548 05/31/24  0800   WBC 4.9 6.2 6.2   HGB 7.7* 8.1* 8.8*   * 102* 99    232 212     Most Recent 3 BMP's:  Recent Labs   Lab Test 06/13/24  0559 06/06/24  0548 05/31/24  0800   * 132* 133*   POTASSIUM 4.6 4.3 3.8   CHLORIDE 101 96* 100   CO2 25 27 23   BUN 20.9 24.8* 8.2   CR 0.51 0.68 0.39*   ANIONGAP 7 9 10   DAVE 10.0* 9.3 8.5   GLC 88 87 91     Most Recent 2 LFT's:  Recent Labs   Lab Test 05/28/24  0635 05/26/24  0012   AST " 10 16   ALT 5 5   ALKPHOS 179* 289*   BILITOTAL 0.6 0.8       Assessment/Plan:  C. difficile colitis: Presented for evaluation of abdominal pain and diarrhea.  CT scan with evidence of colitis and esophagitis.  C. difficile PCR and toxin positive.  GI consulted and recommended p.o. vancomycin with consideration for outpatient FMT  -Recently had [facility] follow up.  Planning to continue suppressive vancomycin. [facility] working on getting FMT covered by insurance  - Continue regular diet    Acute on chronic anemia  History of GI bleed  Esophagitis: Did receive 1 unit PRBCs 5/28 for hemoglobin 6.8.  Discharge hemoglobin 8.8.  Aspirin for DVT prophylaxis discontinued  - Continue Protonix twice daily  - Hemoglobin 6/13 down slightly to 7.7.  No active signs of bleeding  - Repeat hemoglobin 6/24      Fall  Acute comminuted distal left tibia/fibular fracture status post ORIF 5/3/2024:   - Continues NWB at least until next Ortho Follow up 6/25  -Pain control as discussed below  - PT/OT/social work.  - Continue vitamin D supplementation:    Acute on Chronic pain Back Pain  H/o Vertebral compression Fx T11-L3: Chronic back pain in the setting of previous compression fractures.  - Overall seems improved today.  Complains of minimal pain  - Continue scheduled Tylenol  - Adjust methocarbamol to 3 times daily as needed to limit sedation  - Continue as needed Dilaudid      Hypertension: SBP is occasionally in the 90s.  Denies dizziness  - Reduce amlodipine to 5 mg daily  - Continue Coreg, lisinopril, Isordil, and clonidine      CKD stage IIIa: Estimated Creatinine Clearance: Estimated Creatinine Clearance: 51.1 mL/min (based on SCr of 0.51 mg/dL).  -Recent BMP within baseline  - Monitor as clinically appropriate    Cognitive Impairment  MDD  Anxiety  -Continue Zoloft  -Cognition scores at TCU with slums 8/30.  - OT and social work following.  Lives in [facility]    Malnutrition  Failure to Thrive: Has had significant weight loss since admission  to TCU.  Not unexpected given dementia as well as hospitalization for C. difficile colitis.    -Recently nursing staff has noted improvement in oral intake so hopefully weights will slowly begin to trend up  - Facility nutritionist following    Orders:  As above      Electronically signed by: Olivia Melara PA-C

## 2024-06-20 ENCOUNTER — LAB REQUISITION (OUTPATIENT)
Dept: LAB | Facility: CLINIC | Age: 89
End: 2024-06-20
Payer: COMMERCIAL

## 2024-06-20 DIAGNOSIS — D64.9 ANEMIA, UNSPECIFIED: ICD-10-CM

## 2024-06-20 DIAGNOSIS — S82.62XD DISPLACED FRACTURE OF LATERAL MALLEOLUS OF LEFT FIBULA, SUBSEQUENT ENCOUNTER FOR CLOSED FRACTURE WITH ROUTINE HEALING: ICD-10-CM

## 2024-06-20 DIAGNOSIS — S82.252D DISPLACED COMMINUTED FRACTURE OF SHAFT OF LEFT TIBIA, SUBSEQUENT ENCOUNTER FOR CLOSED FRACTURE WITH ROUTINE HEALING: ICD-10-CM

## 2024-06-20 NOTE — TELEPHONE ENCOUNTER
Did not get to connect with nursing right away but at the end of the night able to talk and she wanted this note passed on to primary provider.    Daya was having hallucinations tonight and family uneasy with her demeanor.  Thinking it was the Dilaudid causing the issue.    Resident is sleeping now but nursing asked if able to have a PRN Melatonin in case she becomes restless later on.    Family would like to speak with provider about her medication.    Will pass this on to provider.    Order given:  Melatonin 3mg po at HS prn for sleep disturbances    Frannie Morris, KARTHIK CNP

## 2024-06-21 ENCOUNTER — TRANSITIONAL CARE UNIT VISIT (OUTPATIENT)
Dept: GERIATRICS | Facility: CLINIC | Age: 89
End: 2024-06-21
Payer: COMMERCIAL

## 2024-06-21 ENCOUNTER — HOSPITAL ENCOUNTER (INPATIENT)
Facility: CLINIC | Age: 89
LOS: 2 days | Discharge: NURSING, REHAB OR LONG-TERM CARE | DRG: 177 | End: 2024-06-24
Attending: EMERGENCY MEDICINE | Admitting: INTERNAL MEDICINE
Payer: COMMERCIAL

## 2024-06-21 VITALS
BODY MASS INDEX: 17.69 KG/M2 | RESPIRATION RATE: 18 BRPM | WEIGHT: 103.6 LBS | HEIGHT: 64 IN | SYSTOLIC BLOOD PRESSURE: 116 MMHG | DIASTOLIC BLOOD PRESSURE: 79 MMHG | OXYGEN SATURATION: 95 % | TEMPERATURE: 97.7 F | HEART RATE: 69 BPM

## 2024-06-21 DIAGNOSIS — F03.B3 MODERATE DEMENTIA WITH MOOD DISTURBANCE, UNSPECIFIED DEMENTIA TYPE (H): ICD-10-CM

## 2024-06-21 DIAGNOSIS — M54.50 CHRONIC MIDLINE LOW BACK PAIN, UNSPECIFIED WHETHER SCIATICA PRESENT: ICD-10-CM

## 2024-06-21 DIAGNOSIS — G93.40 ACUTE ENCEPHALOPATHY: ICD-10-CM

## 2024-06-21 DIAGNOSIS — G93.49 INFECTIOUS ENCEPHALOPATHY: ICD-10-CM

## 2024-06-21 DIAGNOSIS — E87.1 HYPONATREMIA: ICD-10-CM

## 2024-06-21 DIAGNOSIS — U07.1 INFECTION DUE TO 2019 NOVEL CORONAVIRUS: Primary | ICD-10-CM

## 2024-06-21 DIAGNOSIS — B99.9 INFECTIOUS ENCEPHALOPATHY: ICD-10-CM

## 2024-06-21 DIAGNOSIS — U07.1 COVID-19: ICD-10-CM

## 2024-06-21 DIAGNOSIS — G89.29 CHRONIC MIDLINE LOW BACK PAIN, UNSPECIFIED WHETHER SCIATICA PRESENT: ICD-10-CM

## 2024-06-21 DIAGNOSIS — I10 BENIGN ESSENTIAL HYPERTENSION: ICD-10-CM

## 2024-06-21 LAB
ALBUMIN SERPL BCG-MCNC: 3.7 G/DL (ref 3.5–5.2)
ALP SERPL-CCNC: 93 U/L (ref 40–150)
ALT SERPL W P-5'-P-CCNC: 7 U/L (ref 0–50)
ANION GAP SERPL CALCULATED.3IONS-SCNC: 13 MMOL/L (ref 7–15)
AST SERPL W P-5'-P-CCNC: 8 U/L (ref 0–45)
BASOPHILS # BLD AUTO: 0 10E3/UL (ref 0–0.2)
BASOPHILS NFR BLD AUTO: 0 %
BILIRUB SERPL-MCNC: 1.2 MG/DL
BUN SERPL-MCNC: 21.2 MG/DL (ref 8–23)
CALCIUM SERPL-MCNC: 10.1 MG/DL (ref 8.2–9.6)
CHLORIDE SERPL-SCNC: 97 MMOL/L (ref 98–107)
CREAT SERPL-MCNC: 0.52 MG/DL (ref 0.51–0.95)
DEPRECATED HCO3 PLAS-SCNC: 22 MMOL/L (ref 22–29)
EGFRCR SERPLBLD CKD-EPI 2021: 86 ML/MIN/1.73M2
EOSINOPHIL # BLD AUTO: 0.2 10E3/UL (ref 0–0.7)
EOSINOPHIL NFR BLD AUTO: 2 %
ERYTHROCYTE [DISTWIDTH] IN BLOOD BY AUTOMATED COUNT: 17.4 % (ref 10–15)
GLUCOSE SERPL-MCNC: 100 MG/DL (ref 70–99)
HCT VFR BLD AUTO: 27.5 % (ref 35–47)
HGB BLD-MCNC: 9.3 G/DL (ref 11.7–15.7)
IMM GRANULOCYTES # BLD: 0 10E3/UL
IMM GRANULOCYTES NFR BLD: 1 %
LACTATE SERPL-SCNC: 0.8 MMOL/L (ref 0.7–2)
LIPASE SERPL-CCNC: 20 U/L (ref 13–60)
LYMPHOCYTES # BLD AUTO: 1.9 10E3/UL (ref 0.8–5.3)
LYMPHOCYTES NFR BLD AUTO: 26 %
MAGNESIUM SERPL-MCNC: 1.9 MG/DL (ref 1.7–2.3)
MCH RBC QN AUTO: 33 PG (ref 26.5–33)
MCHC RBC AUTO-ENTMCNC: 33.8 G/DL (ref 31.5–36.5)
MCV RBC AUTO: 98 FL (ref 78–100)
MONOCYTES # BLD AUTO: 0.8 10E3/UL (ref 0–1.3)
MONOCYTES NFR BLD AUTO: 11 %
NEUTROPHILS # BLD AUTO: 4.4 10E3/UL (ref 1.6–8.3)
NEUTROPHILS NFR BLD AUTO: 60 %
NRBC # BLD AUTO: 0 10E3/UL
NRBC BLD AUTO-RTO: 0 /100
PLATELET # BLD AUTO: 293 10E3/UL (ref 150–450)
POTASSIUM SERPL-SCNC: 4.2 MMOL/L (ref 3.4–5.3)
PROT SERPL-MCNC: 6.9 G/DL (ref 6.4–8.3)
RBC # BLD AUTO: 2.82 10E6/UL (ref 3.8–5.2)
SODIUM SERPL-SCNC: 132 MMOL/L (ref 135–145)
WBC # BLD AUTO: 7.3 10E3/UL (ref 4–11)

## 2024-06-21 PROCEDURE — 83735 ASSAY OF MAGNESIUM: CPT | Performed by: EMERGENCY MEDICINE

## 2024-06-21 PROCEDURE — 83605 ASSAY OF LACTIC ACID: CPT | Performed by: EMERGENCY MEDICINE

## 2024-06-21 PROCEDURE — 99285 EMERGENCY DEPT VISIT HI MDM: CPT | Mod: 25

## 2024-06-21 PROCEDURE — 83690 ASSAY OF LIPASE: CPT | Performed by: EMERGENCY MEDICINE

## 2024-06-21 PROCEDURE — 36415 COLL VENOUS BLD VENIPUNCTURE: CPT | Performed by: EMERGENCY MEDICINE

## 2024-06-21 PROCEDURE — 82374 ASSAY BLOOD CARBON DIOXIDE: CPT | Performed by: EMERGENCY MEDICINE

## 2024-06-21 PROCEDURE — 85004 AUTOMATED DIFF WBC COUNT: CPT | Performed by: EMERGENCY MEDICINE

## 2024-06-21 PROCEDURE — 99309 SBSQ NF CARE MODERATE MDM 30: CPT | Performed by: PHYSICIAN ASSISTANT

## 2024-06-21 RX ORDER — ONDANSETRON 2 MG/ML
4 INJECTION INTRAMUSCULAR; INTRAVENOUS EVERY 30 MIN PRN
Status: DISCONTINUED | OUTPATIENT
Start: 2024-06-21 | End: 2024-06-22

## 2024-06-21 RX ORDER — MORPHINE SULFATE 4 MG/ML
4 INJECTION, SOLUTION INTRAMUSCULAR; INTRAVENOUS
Status: DISCONTINUED | OUTPATIENT
Start: 2024-06-21 | End: 2024-06-22 | Stop reason: ALTCHOICE

## 2024-06-21 RX ORDER — AMLODIPINE BESYLATE 2.5 MG/1
TABLET ORAL
Status: ON HOLD
Start: 2024-06-21 | End: 2024-06-24

## 2024-06-21 ASSESSMENT — ACTIVITIES OF DAILY LIVING (ADL): ADLS_ACUITY_SCORE: 39

## 2024-06-21 ASSESSMENT — COLUMBIA-SUICIDE SEVERITY RATING SCALE - C-SSRS
1. IN THE PAST MONTH, HAVE YOU WISHED YOU WERE DEAD OR WISHED YOU COULD GO TO SLEEP AND NOT WAKE UP?: NO
2. HAVE YOU ACTUALLY HAD ANY THOUGHTS OF KILLING YOURSELF IN THE PAST MONTH?: NO
6. HAVE YOU EVER DONE ANYTHING, STARTED TO DO ANYTHING, OR PREPARED TO DO ANYTHING TO END YOUR LIFE?: NO

## 2024-06-21 NOTE — LETTER
" 6/21/2024      Daya Ignacio  [address]  [place] [address]        [facility] GERIATRICS    Chief Complaint   Patient presents with     Nursing Home Acute     HPI:  Daya Ignacio is a 89 year old  (10/11/1930), who is being seen today for an episodic care visit at: [facility] ([facility]) [[id number]]. Today's concern is: Diagnosed with COVID in the setting of facility wide outbreak.  No upper respiratory symptoms but increased confusion and lethargy compared to 48 hours ago.  Had some hallucinations on evening 6/19.  Nursing now notes intermittently declining cares and medications.  Vital signs are stable    Daya is evaluated in her room.  Indicates she wants to be left alone.  Denies shortness of breath.  Does allow brief exam.      Allergies, and PMH/PSH reviewed in [facility] today.  REVIEW OF SYSTEMS:  Limited secondary to dementia and patient's cooperation    Objective:   /79   Pulse 69   Temp 97.7  F (36.5  C)   Resp 18   Ht 1.626 m (5' 4\")   Wt 47 kg (103 lb 9.6 oz)   SpO2 95%   BMI 17.78 kg/m    Physical Exam  Vitals (Facility EMR) reviewed.   Constitutional:       General: She is not in acute distress.  HENT:      Head: Normocephalic and atraumatic.   Eyes:      General: No scleral icterus.  Cardiovascular:      Rate and Rhythm: Normal rate and regular rhythm.      Heart sounds: No murmur heard.  Pulmonary:      Effort: Pulmonary effort is normal.      Breath sounds: No wheezing or rales.   Musculoskeletal:      Right lower leg: No edema.      Left lower leg: No edema.   Skin:     General: Skin is warm and dry.      Findings: No rash.   Neurological:      Mental Status: She is alert. Mental status is at baseline.   Psychiatric:         Behavior: Behavior normal.           Recent labs in [facility] reviewed by me today.  and Most Recent 3 CBC's:  Recent Labs   Lab Test 06/13/24  0559 06/06/24  0548 05/31/24  0800   WBC 4.9 6.2 6.2   HGB 7.7* 8.1* 8.8*   * " 102* 99    232 212     Most Recent 3 BMP's:  Recent Labs   Lab Test 06/13/24  0559 06/06/24  0548 05/31/24  0800   * 132* 133*   POTASSIUM 4.6 4.3 3.8   CHLORIDE 101 96* 100   CO2 25 27 23   BUN 20.9 24.8* 8.2   CR 0.51 0.68 0.39*   ANIONGAP 7 9 10   DAVE 10.0* 9.3 8.5   GLC 88 87 91       Assessment/Plan:  Symptomatic COVID-19: No respiratory symptoms but symptomatic with increased lethargy as well as encephalopathy.  Meets criteria for treatment  Estimated Creatinine Clearance: 51.1 mL/min (based on SCr of 0.51 mg/dL).  -Renally dosed Paxlovid x 5 days  - Frequent vital signs  - Contact precautions per facility protocol    Hypertension: I just reduced amlodipine 2 days ago due to soft BPs.  Now with addition of Paxlovid increased risk of hypotension  - Reduce amlodipine further to 2.5 mg daily x 8 days, then resume 5 mg daily  - Continue clonidine, isosorbide, and lisinopril    Infectious encephalopathy  Dementia: Baseline cognitive impairment.  Slums on TCU admission 8/30.  Had been doing well but 24 hours ago developed increased confusion and hallucinations.  Last Dilaudid dose 6/17.  Low suspicion this is contributing.  Had already reduced methocarbamol from scheduled to as needed.  Suspect infectious encephalopathy in the setting of COVID-19  - Treat COVID-19 as above  - Reorient as needed  - BMP and CBC 6/24 in the setting of diminished oral intake    Message left for son to discuss POC      MED REC REQUIRED  Post Medication Reconciliation Status: medication reconcilation previously completed during another office visit          Electronically signed by: Olivia Melara PA-C          Sincerely,        Olivia Melara PA-C

## 2024-06-21 NOTE — PROGRESS NOTES
"[facility] GERIATRICS    Chief Complaint   Patient presents with    Nursing Home Acute     HPI:  Daya Ignacio is a 89 year old  (10/11/1930), who is being seen today for an episodic care visit at: [facility] ([facility]) [[id number]]. Today's concern is: Diagnosed with COVID in the setting of facility wide outbreak.  No upper respiratory symptoms but increased confusion and lethargy compared to 48 hours ago.  Had some hallucinations on evening 6/19.  Nursing now notes intermittently declining cares and medications.  Vital signs are stable    Daya is evaluated in her room.  Indicates she wants to be left alone.  Denies shortness of breath.  Does allow brief exam.      Allergies, and PMH/PSH reviewed in [facility] today.  REVIEW OF SYSTEMS:  Limited secondary to dementia and patient's cooperation    Objective:   /79   Pulse 69   Temp 97.7  F (36.5  C)   Resp 18   Ht 1.626 m (5' 4\")   Wt 47 kg (103 lb 9.6 oz)   SpO2 95%   BMI 17.78 kg/m    Physical Exam  Vitals (Facility EMR) reviewed.   Constitutional:       General: She is not in acute distress.  HENT:      Head: Normocephalic and atraumatic.   Eyes:      General: No scleral icterus.  Cardiovascular:      Rate and Rhythm: Normal rate and regular rhythm.      Heart sounds: No murmur heard.  Pulmonary:      Effort: Pulmonary effort is normal.      Breath sounds: No wheezing or rales.   Musculoskeletal:      Right lower leg: No edema.      Left lower leg: No edema.   Skin:     General: Skin is warm and dry.      Findings: No rash.   Neurological:      Mental Status: She is alert. Mental status is at baseline.   Psychiatric:         Behavior: Behavior normal.           Recent labs in [facility] reviewed by me today.  and Most Recent 3 CBC's:  Recent Labs   Lab Test 06/13/24  0559 06/06/24  0548 05/31/24  0800   WBC 4.9 6.2 6.2   HGB 7.7* 8.1* 8.8*   * 102* 99    232 212     Most Recent 3 BMP's:  Recent Labs   Lab Test 06/13/24  0559 " 06/06/24  0548 05/31/24  0800   * 132* 133*   POTASSIUM 4.6 4.3 3.8   CHLORIDE 101 96* 100   CO2 25 27 23   BUN 20.9 24.8* 8.2   CR 0.51 0.68 0.39*   ANIONGAP 7 9 10   DAVE 10.0* 9.3 8.5   GLC 88 87 91       Assessment/Plan:  Symptomatic COVID-19: No respiratory symptoms but symptomatic with increased lethargy as well as encephalopathy.  Meets criteria for treatment  Estimated Creatinine Clearance: 51.1 mL/min (based on SCr of 0.51 mg/dL).  -Renally dosed Paxlovid x 5 days  - Frequent vital signs  - Contact precautions per facility protocol    Hypertension: I just reduced amlodipine 2 days ago due to soft BPs.  Now with addition of Paxlovid increased risk of hypotension  - Reduce amlodipine further to 2.5 mg daily x 8 days, then resume 5 mg daily  - Continue clonidine, isosorbide, and lisinopril    Infectious encephalopathy  Dementia: Baseline cognitive impairment.  Slums on TCU admission 8/30.  Had been doing well but 24 hours ago developed increased confusion and hallucinations.  Last Dilaudid dose 6/17.  Low suspicion this is contributing.  Had already reduced methocarbamol from scheduled to as needed.  Suspect infectious encephalopathy in the setting of COVID-19  - Treat COVID-19 as above  - Reorient as needed  - BMP and CBC 6/24 in the setting of diminished oral intake    Message left for son to discuss POC      MED REC REQUIRED  Post Medication Reconciliation Status: medication reconcilation previously completed during another office visit          Electronically signed by: Olivia Melara PA-C

## 2024-06-22 ENCOUNTER — LAB REQUISITION (OUTPATIENT)
Dept: LAB | Facility: CLINIC | Age: 89
End: 2024-06-22
Payer: COMMERCIAL

## 2024-06-22 ENCOUNTER — APPOINTMENT (OUTPATIENT)
Dept: CT IMAGING | Facility: CLINIC | Age: 89
DRG: 177 | End: 2024-06-22
Attending: EMERGENCY MEDICINE
Payer: COMMERCIAL

## 2024-06-22 DIAGNOSIS — U07.1 COVID-19: ICD-10-CM

## 2024-06-22 PROBLEM — G89.29 CHRONIC MIDLINE LOW BACK PAIN, UNSPECIFIED WHETHER SCIATICA PRESENT: Status: ACTIVE | Noted: 2024-06-22

## 2024-06-22 PROBLEM — G93.40 ACUTE ENCEPHALOPATHY: Status: ACTIVE | Noted: 2024-06-22

## 2024-06-22 PROBLEM — M54.50 CHRONIC MIDLINE LOW BACK PAIN, UNSPECIFIED WHETHER SCIATICA PRESENT: Status: ACTIVE | Noted: 2024-06-22

## 2024-06-22 PROBLEM — E87.1 HYPONATREMIA: Status: ACTIVE | Noted: 2024-06-22

## 2024-06-22 LAB
ALBUMIN UR-MCNC: 30 MG/DL
APPEARANCE UR: ABNORMAL
BACTERIA #/AREA URNS HPF: ABNORMAL /HPF
BILIRUB UR QL STRIP: NEGATIVE
COLOR UR AUTO: YELLOW
GLUCOSE UR STRIP-MCNC: NEGATIVE MG/DL
HGB UR QL STRIP: NEGATIVE
HYALINE CASTS: 7 /LPF
KETONES UR STRIP-MCNC: ABNORMAL MG/DL
LEUKOCYTE ESTERASE UR QL STRIP: ABNORMAL
MUCOUS THREADS #/AREA URNS LPF: PRESENT /LPF
NITRATE UR QL: NEGATIVE
PH UR STRIP: 5.5 [PH] (ref 5–7)
RADIOLOGIST FLAGS: NORMAL
RBC URINE: 23 /HPF
SP GR UR STRIP: 1.02 (ref 1–1.03)
SQUAMOUS EPITHELIAL: 5 /HPF
UROBILINOGEN UR STRIP-MCNC: NORMAL MG/DL
WBC CLUMPS #/AREA URNS HPF: PRESENT /HPF
WBC URINE: 119 /HPF

## 2024-06-22 PROCEDURE — 250N000013 HC RX MED GY IP 250 OP 250 PS 637: Performed by: INTERNAL MEDICINE

## 2024-06-22 PROCEDURE — 250N000011 HC RX IP 250 OP 636: Mod: JZ | Performed by: INTERNAL MEDICINE

## 2024-06-22 PROCEDURE — 96376 TX/PRO/DX INJ SAME DRUG ADON: CPT

## 2024-06-22 PROCEDURE — G0378 HOSPITAL OBSERVATION PER HR: HCPCS

## 2024-06-22 PROCEDURE — 258N000003 HC RX IP 258 OP 636: Mod: JZ | Performed by: INTERNAL MEDICINE

## 2024-06-22 PROCEDURE — XW0DXF5 INTRODUCTION OF OTHER NEW TECHNOLOGY THERAPEUTIC SUBSTANCE INTO MOUTH AND PHARYNX, EXTERNAL APPROACH, NEW TECHNOLOGY GROUP 5: ICD-10-PCS | Performed by: INTERNAL MEDICINE

## 2024-06-22 PROCEDURE — 87086 URINE CULTURE/COLONY COUNT: CPT | Performed by: EMERGENCY MEDICINE

## 2024-06-22 PROCEDURE — 74177 CT ABD & PELVIS W/CONTRAST: CPT

## 2024-06-22 PROCEDURE — 99207 PR APP CREDIT; MD BILLING SHARED VISIT: CPT

## 2024-06-22 PROCEDURE — 99223 1ST HOSP IP/OBS HIGH 75: CPT | Performed by: INTERNAL MEDICINE

## 2024-06-22 PROCEDURE — 258N000003 HC RX IP 258 OP 636: Mod: JZ | Performed by: EMERGENCY MEDICINE

## 2024-06-22 PROCEDURE — 250N000011 HC RX IP 250 OP 636: Mod: JZ | Performed by: EMERGENCY MEDICINE

## 2024-06-22 PROCEDURE — 99207 PR APP CREDIT; MD BILLING SHARED VISIT: CPT | Performed by: INTERNAL MEDICINE

## 2024-06-22 PROCEDURE — 70450 CT HEAD/BRAIN W/O DYE: CPT

## 2024-06-22 PROCEDURE — 96374 THER/PROPH/DIAG INJ IV PUSH: CPT | Mod: 59

## 2024-06-22 PROCEDURE — 96375 TX/PRO/DX INJ NEW DRUG ADDON: CPT

## 2024-06-22 PROCEDURE — 81001 URINALYSIS AUTO W/SCOPE: CPT | Performed by: EMERGENCY MEDICINE

## 2024-06-22 PROCEDURE — 120N000001 HC R&B MED SURG/OB

## 2024-06-22 PROCEDURE — 250N000011 HC RX IP 250 OP 636: Performed by: EMERGENCY MEDICINE

## 2024-06-22 PROCEDURE — 87186 SC STD MICRODIL/AGAR DIL: CPT | Performed by: EMERGENCY MEDICINE

## 2024-06-22 RX ORDER — VANCOMYCIN HYDROCHLORIDE 125 MG/1
125 CAPSULE ORAL 2 TIMES DAILY
Status: DISCONTINUED | OUTPATIENT
Start: 2024-06-22 | End: 2024-06-24 | Stop reason: HOSPADM

## 2024-06-22 RX ORDER — NALOXONE HYDROCHLORIDE 0.4 MG/ML
0.2 INJECTION, SOLUTION INTRAMUSCULAR; INTRAVENOUS; SUBCUTANEOUS
Status: DISCONTINUED | OUTPATIENT
Start: 2024-06-22 | End: 2024-06-24 | Stop reason: HOSPADM

## 2024-06-22 RX ORDER — AMOXICILLIN 250 MG
2 CAPSULE ORAL 2 TIMES DAILY PRN
Status: DISCONTINUED | OUTPATIENT
Start: 2024-06-22 | End: 2024-06-24 | Stop reason: HOSPADM

## 2024-06-22 RX ORDER — ALBUTEROL SULFATE 0.83 MG/ML
2.5 SOLUTION RESPIRATORY (INHALATION) EVERY 4 HOURS PRN
Status: DISCONTINUED | OUTPATIENT
Start: 2024-06-22 | End: 2024-06-24 | Stop reason: HOSPADM

## 2024-06-22 RX ORDER — IOPAMIDOL 755 MG/ML
500 INJECTION, SOLUTION INTRAVASCULAR ONCE
Status: COMPLETED | OUTPATIENT
Start: 2024-06-22 | End: 2024-06-22

## 2024-06-22 RX ORDER — LANOLIN ALCOHOL/MO/W.PET/CERES
3 CREAM (GRAM) TOPICAL
COMMUNITY

## 2024-06-22 RX ORDER — SERTRALINE HYDROCHLORIDE 100 MG/1
100 TABLET, FILM COATED ORAL DAILY
Status: DISCONTINUED | OUTPATIENT
Start: 2024-06-22 | End: 2024-06-24 | Stop reason: HOSPADM

## 2024-06-22 RX ORDER — ACETAMINOPHEN 500 MG
1000 TABLET ORAL 3 TIMES DAILY
Status: DISCONTINUED | OUTPATIENT
Start: 2024-06-22 | End: 2024-06-24 | Stop reason: HOSPADM

## 2024-06-22 RX ORDER — CEFTRIAXONE 1 G/1
1 INJECTION, POWDER, FOR SOLUTION INTRAMUSCULAR; INTRAVENOUS EVERY 24 HOURS
Status: DISCONTINUED | OUTPATIENT
Start: 2024-06-22 | End: 2024-06-24 | Stop reason: HOSPADM

## 2024-06-22 RX ORDER — AMOXICILLIN 250 MG
1 CAPSULE ORAL AT BEDTIME
Status: DISCONTINUED | OUTPATIENT
Start: 2024-06-22 | End: 2024-06-24 | Stop reason: HOSPADM

## 2024-06-22 RX ORDER — LIDOCAINE 4 G/G
1 PATCH TOPICAL
Status: DISCONTINUED | OUTPATIENT
Start: 2024-06-22 | End: 2024-06-24 | Stop reason: HOSPADM

## 2024-06-22 RX ORDER — ONDANSETRON 2 MG/ML
4 INJECTION INTRAMUSCULAR; INTRAVENOUS EVERY 6 HOURS PRN
Status: DISCONTINUED | OUTPATIENT
Start: 2024-06-22 | End: 2024-06-24 | Stop reason: HOSPADM

## 2024-06-22 RX ORDER — HYDROMORPHONE HYDROCHLORIDE 2 MG/1
2 TABLET ORAL EVERY 6 HOURS PRN
Status: DISCONTINUED | OUTPATIENT
Start: 2024-06-22 | End: 2024-06-23

## 2024-06-22 RX ORDER — SODIUM CHLORIDE 9 MG/ML
INJECTION, SOLUTION INTRAVENOUS CONTINUOUS
Status: DISCONTINUED | OUTPATIENT
Start: 2024-06-22 | End: 2024-06-22

## 2024-06-22 RX ORDER — POLYETHYLENE GLYCOL 3350 17 G/17G
17 POWDER, FOR SOLUTION ORAL DAILY PRN
Status: DISCONTINUED | OUTPATIENT
Start: 2024-06-22 | End: 2024-06-24 | Stop reason: HOSPADM

## 2024-06-22 RX ORDER — SODIUM CHLORIDE 9 MG/ML
INJECTION, SOLUTION INTRAVENOUS CONTINUOUS
Status: DISCONTINUED | OUTPATIENT
Start: 2024-06-22 | End: 2024-06-23

## 2024-06-22 RX ORDER — NYSTATIN 100000 U/G
CREAM TOPICAL 2 TIMES DAILY PRN
Status: DISCONTINUED | OUTPATIENT
Start: 2024-06-22 | End: 2024-06-24 | Stop reason: HOSPADM

## 2024-06-22 RX ORDER — NALOXONE HYDROCHLORIDE 0.4 MG/ML
0.4 INJECTION, SOLUTION INTRAMUSCULAR; INTRAVENOUS; SUBCUTANEOUS
Status: DISCONTINUED | OUTPATIENT
Start: 2024-06-22 | End: 2024-06-24 | Stop reason: HOSPADM

## 2024-06-22 RX ORDER — AMOXICILLIN 250 MG
1 CAPSULE ORAL 2 TIMES DAILY PRN
Status: DISCONTINUED | OUTPATIENT
Start: 2024-06-22 | End: 2024-06-24 | Stop reason: HOSPADM

## 2024-06-22 RX ORDER — ONDANSETRON 4 MG/1
4 TABLET, ORALLY DISINTEGRATING ORAL EVERY 6 HOURS PRN
Status: DISCONTINUED | OUTPATIENT
Start: 2024-06-22 | End: 2024-06-24 | Stop reason: HOSPADM

## 2024-06-22 RX ORDER — ONDANSETRON 4 MG/1
4 TABLET, FILM COATED ORAL EVERY 8 HOURS PRN
Status: DISCONTINUED | OUTPATIENT
Start: 2024-06-22 | End: 2024-06-22

## 2024-06-22 RX ORDER — ISOSORBIDE DINITRATE 10 MG/1
10 TABLET ORAL 3 TIMES DAILY
Status: DISCONTINUED | OUTPATIENT
Start: 2024-06-22 | End: 2024-06-22

## 2024-06-22 RX ADMIN — MORPHINE SULFATE 4 MG: 4 INJECTION INTRAVENOUS at 00:45

## 2024-06-22 RX ADMIN — ACETAMINOPHEN 1000 MG: 500 TABLET, FILM COATED ORAL at 21:24

## 2024-06-22 RX ADMIN — MORPHINE SULFATE 4 MG: 4 INJECTION INTRAVENOUS at 00:04

## 2024-06-22 RX ADMIN — CEFTRIAXONE 1 G: 1 INJECTION, POWDER, FOR SOLUTION INTRAMUSCULAR; INTRAVENOUS at 03:47

## 2024-06-22 RX ADMIN — NIRMATRELVIR AND RITONAVIR 2 TABLET: KIT at 21:24

## 2024-06-22 RX ADMIN — SODIUM CHLORIDE: 9 INJECTION, SOLUTION INTRAVENOUS at 03:47

## 2024-06-22 RX ADMIN — NIRMATRELVIR AND RITONAVIR 2 TABLET: KIT at 14:45

## 2024-06-22 RX ADMIN — HYDROMORPHONE HYDROCHLORIDE 2 MG: 2 TABLET ORAL at 12:33

## 2024-06-22 RX ADMIN — SODIUM CHLORIDE 1000 ML: 9 INJECTION, SOLUTION INTRAVENOUS at 00:04

## 2024-06-22 RX ADMIN — VANCOMYCIN HYDROCHLORIDE 125 MG: 125 CAPSULE ORAL at 14:38

## 2024-06-22 RX ADMIN — ONDANSETRON 4 MG: 2 INJECTION INTRAMUSCULAR; INTRAVENOUS at 00:04

## 2024-06-22 RX ADMIN — SERTRALINE 100 MG: 100 TABLET, FILM COATED ORAL at 14:38

## 2024-06-22 RX ADMIN — ACETAMINOPHEN 1000 MG: 500 TABLET, FILM COATED ORAL at 14:37

## 2024-06-22 RX ADMIN — VANCOMYCIN HYDROCHLORIDE 125 MG: 125 CAPSULE ORAL at 21:24

## 2024-06-22 RX ADMIN — IOPAMIDOL 52 ML: 755 INJECTION, SOLUTION INTRAVENOUS at 00:30

## 2024-06-22 ASSESSMENT — ACTIVITIES OF DAILY LIVING (ADL)
ADLS_ACUITY_SCORE: 41
ADLS_ACUITY_SCORE: 50
ADLS_ACUITY_SCORE: 41
ADLS_ACUITY_SCORE: 39
ADLS_ACUITY_SCORE: 39
ADLS_ACUITY_SCORE: 54
ADLS_ACUITY_SCORE: 55
ADLS_ACUITY_SCORE: 41
ADLS_ACUITY_SCORE: 41
ADLS_ACUITY_SCORE: 54
ADLS_ACUITY_SCORE: 41
ADLS_ACUITY_SCORE: 50
ADLS_ACUITY_SCORE: 53
ADLS_ACUITY_SCORE: 50
ADLS_ACUITY_SCORE: 41
ADLS_ACUITY_SCORE: 39
ADLS_ACUITY_SCORE: 41
ADLS_ACUITY_SCORE: 55
ADLS_ACUITY_SCORE: 41
ADLS_ACUITY_SCORE: 55
ADLS_ACUITY_SCORE: 55
ADLS_ACUITY_SCORE: 54
ADLS_ACUITY_SCORE: 41

## 2024-06-22 NOTE — ED PROVIDER NOTES
Emergency Department Note      History of Present Illness     Chief Complaint  Abdominal Pain    HPI  Daya Ignacio is a 89 year old female with a history of C difficile colitis presenting from the [facility] for evaluation of lower back and abdominal pain that onset yesterday (6/20/24). Her son reports that approximately a week ago Daya was observed to have an increase in hallucinations accompanied by a decrease in appetite and nausea. Of note, she has been confused. Today (06/22/24) she was tested positive for COVID-19 but currently reports no issues with respiration. Her son reports that she has been bed bound due to a operation of the left distal tibia (5/3/24). Daya has not taken her Dilaudid since Monday (06/17/24).      Independent Historian  Son was present with patient and endorses the information above.     Review of External Notes  I reviewed the transitional care note from 06/21/24. Tested positive for COVID-19. No significant respiratory distress.   I reviewed the admission note from 5/25/24-5/31/24. She was hospitalized for C. Difficile colitis.     Past Medical History   Medical History and Problem List  Anemia  Anxiety  Arthritis  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   Chronic low back pain  Depression  Clostridium difficile colitis  Mumps  Hypertension  Kidney stone  Lumbar compression fracture  Macular degeneration (senile) of retina, unspecified  Mild cognitive impairment  NSTEMI   Osteoporosis   PONV  Recurrent UTI  Seasonal allergic rhinitis  Sciatica   Upper GI bleed  Urinary tract infection (ESBL)-producing Klebsiella    Medications  Albuterol   Amlodipine   Carvedilol   Hydromorphone   Lisinopril   Methocarbamol   Nystatin   Ondansetron   Sertraline     Surgical History   Hip arthroplasty   Cystoscopy   EGD  Lithotripsy   ORIF, ankle   ORIF, hip   Tonsillectomy   Adenoidectomy     Physical Exam   Patient Vitals for the past 24 hrs:   BP Temp Temp src Pulse Resp SpO2 Height Weight  "  06/21/24 2306 105/62 -- -- 72 20 94 % -- --   06/21/24 2251 113/64 -- -- 72 15 95 % -- --   06/21/24 2217 (!) 142/62 98.1  F (36.7  C) Oral 73 16 97 % 1.676 m (5' 6\") 46.7 kg (103 lb)     Physical Exam  General: Patient is awake, alert and interactive when I enter the room. Appears uncomfortable.   Head: The scalp, face, and head appear normal  Eyes: Conjunctivae and sclerae are normal  Neck: Normal range of motion.   CV: Regular rate.   Resp:  No respiratory distress.   GI: diffuse tenderness but abdomen is soft, no rigidity. No evidence of pulsatile mass. No fluid waves or evidence of ascites. No distension. No hernias or bruising are noted in detailed exam. No CVA tenderness.    MS: Normal tone. Midline back pain along lumbar spine   Skin: Normal capillary refill noted  Neuro: Speech is normal and fluent. Face is symmetric. Moving all extremities.   Psych:  Normal affect.  Appropriate interactions.    Diagnostics   Lab Results   Labs Ordered and Resulted from Time of ED Arrival to Time of ED Departure   COMPREHENSIVE METABOLIC PANEL - Abnormal       Result Value    Sodium 132 (*)     Potassium 4.2      Carbon Dioxide (CO2) 22      Anion Gap 13      Urea Nitrogen 21.2      Creatinine 0.52      GFR Estimate 86      Calcium 10.1 (*)     Chloride 97 (*)     Glucose 100 (*)     Alkaline Phosphatase 93      AST 8      ALT 7      Protein Total 6.9      Albumin 3.7      Bilirubin Total 1.2     CBC WITH PLATELETS AND DIFFERENTIAL - Abnormal    WBC Count 7.3      RBC Count 2.82 (*)     Hemoglobin 9.3 (*)     Hematocrit 27.5 (*)     MCV 98      MCH 33.0      MCHC 33.8      RDW 17.4 (*)     Platelet Count 293      % Neutrophils 60      % Lymphocytes 26      % Monocytes 11      % Eosinophils 2      % Basophils 0      % Immature Granulocytes 1      NRBCs per 100 WBC 0      Absolute Neutrophils 4.4      Absolute Lymphocytes 1.9      Absolute Monocytes 0.8      Absolute Eosinophils 0.2      Absolute Basophils 0.0      Absolute " Immature Granulocytes 0.0      Absolute NRBCs 0.0     LIPASE - Normal    Lipase 20     MAGNESIUM - Normal    Magnesium 1.9     LACTIC ACID WHOLE BLOOD WITH 1X REPEAT IN 2 HR WHEN >2 - Normal    Lactic Acid, Initial 0.8     ROUTINE UA WITH MICROSCOPIC REFLEX TO CULTURE       Imaging  CT Head w/o Contrast   Final Result   IMPRESSION:   1.  No CT evidence for acute intracranial process.   2.  Brain atrophy and presumed chronic microvascular ischemic changes as above.      CT Abdomen Pelvis w Contrast   Final Result   IMPRESSION:    1.  Suspected constipation.   2.  Hepatic steatosis.   3.  Cholelithiasis.   4.  Nonobstructing 7 mm left renal stone.   5.  Colonic diverticulosis.   6.  Bilobed, cystic left adnexal lesion, measuring up to 3.5 cm. Nonemergent pelvic sonography follow-up is recommended.         [Recommend Follow Up: Ovarian cyst]      This report will be copied to the [facility] to ensure a provider acknowledges the finding.            ED Course    Medications Administered  Medications   ondansetron (ZOFRAN) injection 4 mg (4 mg Intravenous $Given 6/22/24 0004)   morphine (PF) injection 4 mg (4 mg Intravenous $Given 6/22/24 0045)   sodium chloride 0.9% BOLUS 1,000 mL (1,000 mLs Intravenous $New Bag 6/22/24 0004)   sodium chloride (PF) 0.9% PF flush 100 mL (52 mLs Intravenous $Given 6/22/24 0030)   iopamidol (ISOVUE-370) solution 500 mL (52 mLs Intravenous $Given 6/22/24 0030)     Procedures  Procedures     Discussion of Management  Admitting Hospitalist, Dr. Dos Santos    Social Determinants of Health adding to complexity of care  None    ED Course  ED Course as of 06/22/24 0125   Fri Jun 21, 2024 2220 I obtained the history and examined the patient as noted above.      Sat Jun 22, 2024 0124 I spoke with Dr. Dos Santos from the hospitalist service regarding the patient's presentation, findings here in the ED, and plan of care.       Medical Decision Making / Diagnosis   CMS Diagnoses:  None    MIPS     None    [facility]  Daya Ignacio is a 89 year old female with past medical history of some mild dementia, C. difficile colitis and recent tib-fib fracture requiring stay and TCU who presents to the emergency department with low back pain, diffuse abdominal pain, confusion and hallucinations.  Please see HPI for further details.  Upon initial evaluation she is hemodynamically stable and afebrile.  She looks quite uncomfortable and has some diffuse abdominal discomfort.  She is some chronic midline back pain which son reports are from chronic compression fractures.  Broad workup was initiated to investigate her encephalopathy.  Patient does have known COVID but no significant respiratory symptoms.  COVID certainly could be contributing to her encephalopathy.  CT of the head shows no signs of intracranial hemorrhage or mass.  CT of the abdomen and pelvis shows evidence of constipation but no acute cause for her abdominal pain otherwise.  UA shows signs of infection.  Patient does have a history of chronic colonization with E. coli.  However given her encephalopathy will be treated with antibiotics.  Given her ongoing hallucinations and confusion will be admitted for further evaluation and treatment of her COVID, possible UTI and altered mental status.    Disposition  The patient was admitted to the hospital.     ICD-10 Codes:    ICD-10-CM    1. COVID-19  U07.1       2. Hyponatremia  E87.1       3. Chronic midline low back pain, unspecified whether sciatica present  M54.50     G89.29       4. Acute encephalopathy  G93.40            Scribe Disclosure:  ILachelle, am serving as a scribe at 12:15 AM on 6/22/2024 to document services personally performed by Paresh Stanton MD based on my observations and the provider's statements to me.     Scribe Disclosure:  Claudette LYNN, am serving as the scribe  for Lachelle Carbajal, the scribe trainee, at 1:22 AM on 6/22/2024 to document services  personally performed by Paresh Stanton MD based on our observations and the provider's statements to us.         Paresh Stanton MD  06/22/24 6164

## 2024-06-22 NOTE — PROGRESS NOTES
Patient seen and examined.  Chart reviewed.  Please see admission history and physical by Dr. Dos Santos from earlier today for details.

## 2024-06-22 NOTE — ED NOTES
Bed: ED02  Expected date: 6/21/24  Expected time: 10:07 PM  Means of arrival: Ambulance  Comments:  Anju 89F COVID+, c/o abd pain

## 2024-06-22 NOTE — PLAN OF CARE
"Goal Outcome Evaluation:      Plan of Care Reviewed With: patient    Overall Patient Progress: no changeOverall Patient Progress: no change, patient refuses care, doesn't want to be touch.could.    Problem: Adult Inpatient Plan of Care  Goal: Plan of Care Review  Description: The Plan of Care Review/Shift note should be completed every shift.  The Outcome Evaluation is a brief statement about your assessment that the patient is improving, declining, or no change.  This information will be displayed automatically on your shift  note.  Outcome: Progressing  Flowsheets (Taken 6/22/2024 0742)  Plan of Care Reviewed With: patient  Overall Patient Progress: no change  Goal: Patient-Specific Goal (Individualized)  Description: You can add care plan individualizations to a care plan. Examples of Individualization might be:  \"Parent requests to be called daily at 9am for status\", \"I have a hard time hearing out of my right ear\", or \"Do not touch me to wake me up as it startles  me\".  Outcome: Progressing  Goal: Absence of Hospital-Acquired Illness or Injury  Outcome: Progressing  Goal: Optimal Comfort and Wellbeing  Outcome: Progressing  Goal: Readiness for Transition of Care  Outcome: Progressing     Problem: Pain Acute  Goal: Optimal Pain Control and Function  Outcome: Progressing              "

## 2024-06-22 NOTE — CONSULTS
Care Management Initial Consult    General Information  Assessment completed with: Gael Ennis  Type of CM/SW Visit: Initial Assessment    Primary Care Provider verified and updated as needed: No   Readmission within the last 30 days: current reason for admission unrelated to previous admission   Return Category: New Diagnosis  Reason for Consult: care coordination/care conference, discharge planning     Communication Assessment  Patient's communication style: spoken language (English or Bilingual)       Cognitive  Cognitive/Neuro/Behavioral: .WDL except (DARRIAN - Patient refused to answer and requested staff to leave now)                      Living Environment:   People in home: facility resident     Current living Arrangements: other (see comments) (came in from TCU)  [facility] of Facility: [facility]   Able to return to prior arrangements: yes       Family/Social Support:  Care provided by:    Provides care for:    Marital Status:   Children          Description of Support System: Supportive, Involved    Support Assessment: Adequate family and caregiver support    Current Resources:   Patient receiving home care services: No     Community Resources: None    Lifestyle & Psychosocial Needs:  Social Determinants of Health     Food Insecurity: Not on file   Depression: Not at risk (1/15/2019)    PHQ-2     PHQ-2 Score: 0   Housing Stability: Not on file   Tobacco Use: Medium Risk (6/21/2024)    Patient History     Smoking Tobacco Use: Former     Smokeless Tobacco Use: Never     Passive Exposure: Not on file   Financial Resource Strain: Not on file   Alcohol Use: Not on file   Transportation Needs: Not on file   Physical Activity: Not on file   Interpersonal Safety: Not on file   Stress: Not on file   Social Connections: Not on file   Health Literacy: Not on file       Discharge Date: 06/24/2024     Discharge Disposition: Skilled Nursing Facility, Transitional Care    Discharge Services: Transportation  Services    Discharge Transportation: agency     Patient/family educated on Medicare website which has current facility and service quality ratings: no    Education Provided on the Discharge Plan: Yes  Persons Notified of Discharge Plans: son, Transitional Care Unit admissions  Patient/Family in Agreement with the Plan: yes    Handoff Referral Completed: No    Additional Information:  Care Management has been consulted for discharge planning and return to facility with URR 18%. Per chart review, pt is currently at [facility] ([facility] Spoke to patient's son Gael to confirm she was at [facility]. He confirms that baseline she lives at [facility]. Patient/family is anticipating she will return to [facility] on discharge and states they will be holding the bed. Verbal permission received to speak to the facility to verify bed status and facilitate return. Call placed and message left on [facility] Admission line updating them on current plan of care and family wishes for patient to return on discharge. Will continue to follow for discharge plan of care.    Transportation: [facility] transportation per son, she went via wheelchair transport last admission    CM will cont to follow for discharge planning and return to facility.          Alberta Rojas RN BSN CM  Inpatient Care Coordination  [facility]  [phone]

## 2024-06-22 NOTE — PLAN OF CARE
"PRIMARY DIAGNOSIS: Hyponatremia, Acute Encephalopathy, Chronic Midline low back pain, COVID 10    OUTPATIENT/OBSERVATION GOALS TO BE MET BEFORE DISCHARGE  1. Orthostatic performed: No    2. Tolerating PO medications: DARRIAN, patient refused to take medications    3. Return to near baseline physical activity: No    4. Cleared for discharge by consultants (if involved): No    Discharge Planner Nurse   Safe discharge environment identified: No  Barriers to discharge: Yes       Entered by: Carissa Juan RN 06/22/2024 8:00AM   Patient refused cares this AM, refused IVF, oral medications and lidocaine patch. Received in report that patient is Ax2, patient has not been willing to get out of bed at this time. O2 on 2LPM in mid 90s, no cough noted when in room. DARRIAN patient systems due to patient refusing to allow or answer any questions. Patient reports \"I am 89 years old and I know what I need is sleep, so stop bothering me and get out.\" When asked about pain, patient yelled \"no, no get out\". Patient is not violent simply unwilling to let staff provide general cares or take medications. Plan: allow patient to sleep and re-approach a little later. PT Consult  Please review provider order for any additional goals.   Nurse to notify provider when observation goals have been met and patient is ready for discharge.  Problem: Adult Inpatient Plan of Care  Goal: Plan of Care Review  Description: The Plan of Care Review/Shift note should be completed every shift.  The Outcome Evaluation is a brief statement about your assessment that the patient is improving, declining, or no change.  This information will be displayed automatically on your shift  note.  Outcome: Not Progressing  Flowsheets (Taken 6/22/2024 0800)  Outcome Evaluation: Refusing cares  Plan of Care Reviewed With: patient  Overall Patient Progress: no change  Goal: Patient-Specific Goal (Individualized)  Description: You can add care plan individualizations to a " "care plan. Examples of Individualization might be:  \"Parent requests to be called daily at 9am for status\", \"I have a hard time hearing out of my right ear\", or \"Do not touch me to wake me up as it startles  me\".  Outcome: Not Progressing  Goal: Absence of Hospital-Acquired Illness or Injury  Outcome: Not Progressing  Intervention: Identify and Manage Fall Risk  Recent Flowsheet Documentation  Taken 6/22/2024 0800 by Carissa Juan, RN  Safety Promotion/Fall Prevention: safety round/check completed  Intervention: Prevent Infection  Recent Flowsheet Documentation  Taken 6/22/2024 0800 by Carissa Juan, RN  Infection Prevention:   hand hygiene promoted   personal protective equipment utilized   rest/sleep promoted   single patient room provided  Goal: Optimal Comfort and Wellbeing  Outcome: Not Progressing  Goal: Readiness for Transition of Care  Outcome: Not Progressing     Problem: Pain Acute  Goal: Optimal Pain Control and Function  Outcome: Not Progressing  Intervention: Prevent or Manage Pain  Recent Flowsheet Documentation  Taken 6/22/2024 0800 by Carissa Juan, RN  Medication Review/Management: medications reviewed     Goal Outcome Evaluation:      Plan of Care Reviewed With: patient    Overall Patient Progress: no changeOverall Patient Progress: no change    Outcome Evaluation: Refusing cares      "

## 2024-06-22 NOTE — ED TRIAGE NOTES
Pt arrives from [facility] with c/o abd pain, no appetite since yesterday. Tested +Covid 6/20/24. Refusing to eat and PO medications. States she doesn't want to eat because she is nauseous. Denies vomiting. Last BM yesterday. A&OX4. Son POA, agreed ED transfer.      Triage Assessment (Adult)       Row Name 06/21/24 1796          Triage Assessment    Airway WDL WDL        Respiratory WDL    Respiratory WDL WDL        Skin Circulation/Temperature WDL    Skin Circulation/Temperature WDL WDL        Cardiac WDL    Cardiac WDL WDL        Peripheral/Neurovascular WDL    Peripheral Neurovascular WDL WDL        Cognitive/Neuro/Behavioral WDL    Cognitive/Neuro/Behavioral WDL WDL

## 2024-06-22 NOTE — PLAN OF CARE
[facility]    ED Boarding Nurse Handoff Addendum Report:    Date/time: 6/22/2024, 3:37 AM    Activity Level: in bed    Fall Risk: Yes:  room door open    Active Infusions: vancomycin and continuous fluids    Current care needs: abx, PT, pain management,     Oxygen requirements (liters/min and/or FiO2): 2L    Respiratory status: Nasal cannula    Vital signs (within last 30 minutes):    Vitals:    06/22/24 0118 06/22/24 0140 06/22/24 0200 06/22/24 0301   BP: 103/54 111/73 106/52 91/59   Pulse: 72 77 71 68   Resp: 18 21 16    Temp:       TempSrc:       SpO2: 98% 98% 98% 98%   Weight:       Height:           Focused assessment within last 30 minutes:    Pt A&Ox4. Denies pain and is requesting to be left alone. At time of transfer, pt became agitated as she wanted to sleep and she began yelling out for help and to leave her alone. Pt reassured she can sleep a bit when she moves to her new room and that we are here to help her.     ED Boarding Nurse name: Yohan Gan, RN

## 2024-06-22 NOTE — PHARMACY-ADMISSION MEDICATION HISTORY
Pharmacy Intern Admission Medication History    Admission medication history is complete. The information provided in this note is only as accurate as the sources available at the time of the update.    Information Source(s): Facility ([facility]/) medication list/MAR via N/A    Pertinent Information: Med list was verified from a MAR sent from [facility] ([phone]/ [phone])    Changes made to PTA medication list:  Added: melatonin  Deleted: None  Changed: None    Allergies reviewed with patient and updates made in EHR: yes    Medication History Completed By: Bartolome Thompson 6/22/2024 11:11 AM    PTA Med List   Medication Sig Last Dose    acetaminophen (TYLENOL) 500 MG tablet Take 1,000 mg by mouth 3 times daily 6/21/2024 at NOON    albuterol (PROVENTIL) (2.5 MG/3ML) 0.083% neb solution Take 1 vial (2.5 mg) by nebulization every 4 hours as needed for shortness of breath, wheezing or cough Unknown at PRN    amLODIPine (NORVASC) 2.5 MG tablet Take 1 tablet (2.5 mg) by mouth daily for 8 days, THEN 2 tablets (5 mg) daily for 90 days. Unknown at -    carvedilol (COREG) 12.5 MG tablet Take 12.5 mg by mouth 2 times daily (with meals) 6/21/2024 at NOON    Cholecalciferol (VITAMIN D3) 2000 UNITS CAPS Take 2,000 Units by mouth daily  6/21/2024 at NOON    cloNIDine (CATAPRES) 0.1 MG tablet Take 0.1 mg by mouth at bedtime Past Week at -    cyanocobalamin (VITAMIN B-12) 1000 MCG tablet Take 1,000 mcg by mouth daily 6/21/2024 at NOON    Ferrous Gluconate 324 (37.5 Fe) MG TABS Take 1 tablet by mouth daily 6/21/2024 at NOON    HYDROmorphone (DILAUDID) 2 MG tablet Take 1 tablet (2 mg) by mouth every 6 hours as needed for pain Past Week at -    isosorbide dinitrate (ISORDIL) 10 MG tablet Take 1 tablet (10 mg) by mouth 3 times daily 6/21/2024 at NOON    lisinopril (ZESTRIL) 30 MG tablet Take 30 mg by mouth daily 6/21/2024 at NOON    melatonin 3 MG tablet Take 3 mg by mouth nightly as needed for sleep Unknown at PRN     methocarbamol (ROBAXIN) 500 MG tablet Take 0.5 tablets (250 mg) by mouth 3 times daily as needed Unknown at PRN    Multiple Vitamins-Minerals (PRESERVISION AREDS) CAPS Take 1 capsule by mouth 2 times daily  6/21/2024 at NOON    nirmatrelvir and ritonavir (PAXLOVID) 150 mg/100 mg therapy pack Take 2 tablets by mouth 2 times daily for 5 days Unknown at -    nystatin (MYCOSTATIN) 398511 UNIT/GM external cream Apply topically 2 times daily as needed (rash) Unknown at PRN    ondansetron (ZOFRAN) 4 MG tablet Take 4 mg by mouth every 8 hours as needed for nausea Unknown at PRN    polyethylene glycol (MIRALAX/GLYCOLAX) packet Take 17 g by mouth daily as needed for constipation 6/16/2024 at -    SENNA-docusate sodium (SENNA S) 8.6-50 MG tablet Take 1 tablet by mouth at bedtime 6/21/2024 at NOON    sertraline (ZOLOFT) 100 MG tablet Take 100 mg by mouth daily 6/21/2024 at NOON    vancomycin (VANCOCIN) 125 MG capsule Take 1 capsule (125 mg) by mouth 4 times daily for 10 days, THEN 1 capsule (125 mg) 2 times daily for 30 days. 6/21/2024 at NOON

## 2024-06-22 NOTE — H&P
[facility]    Hospitalist History and Physical    Name: Daya Ignacio    MRN: [id number]  YOB: 1930    Age: 89 year old  Date of Admission:  6/21/2024  Date of Service (when I saw the patient): 06/22/24    Assessment & Plan   Daya Ignacio is a  89-year-old female  with PMH including HTN, NSTEMI, MDD, anxiety, mild cognitive impairment, anemia, chronic back pain from compression fractures, UTI, GI bleed, nephrolithiasis, C. Difficile, recent COVID-19 diagnosis presents with acute encephalopathy, dehydration, and medication non-compliance, including missing doses of Dilaudid for chronic pain and Paxlovid for COVID-19. She has a history of chronic pain from compression fractures, recent C. difficile colitis, and chronic colonization with ESBL and VRE. CT scans ruled out surgical issues and significant brain abnormalities.  Admitted for resuming medications, IV hydration, pain management, and close monitoring for mental status and possible infection    COVID-19 Infection:   -Diagnosed with COVID-19 at her facility 6/20;   -likely contributing to her encephalopathy and dehydration due to poor oral intake.  -Continue Paxlovid    Acute encephalopathy:   Cognitive Decline: Acute hallucinations and confusion, new onset this week.  -Likely multifactorial, related to COVID-19 infection, dehydration and narcotic pain meds  -Will check urine analysis to rule out acute UTI as underlying cause for her confusion    Addendum: UA positive for UTI recent urine culture showed E. coli sensitive to ceftriaxone: Will treat given that patient has acute mental status change which she has had in past with recurrent UTIs.  Started IV ceftriaxone continue C. difficile prophylaxis with oral vancomycin given history of C. difficile colitis    Mild dehydration  -IV fluids overnight  -Monitor electrolytes and renal functions in a.m.    Medication Non-compliance:  - Not taking prescribed medications,  including Dilaudid for chronic pain and Paxlovid for COVID-19.    Chronic Pain:   -Chronic back pain from compression fractures, likely exacerbated due to missing pain meds  -Try topical Lidoderm Derm patch  -Resume low-dose narcotic meds carefully given underlying acute encephalopathy.   -seroquel PRN  -Consider pain team consult    Constipation:   -Stool softeners and as needed laxatives    Chronic Anemia   -Stable    Hyponatremia:   -Mild but stable.    HTN: PTA on carvedilol 12.5 mg twice daily, lisinopril 30 mg daily, Isordil 10 mg 3 times daily, and amlodipine 10 mg daily.    -Patient's blood pressure is soft this evening holding off any antihypertensive at this time  -Will need to review and reorder meds once reconciled.    -Colitis (Recent):  - History of C. difficile colitis, currently resolved.      -ESBL and VRE Colonization:   -Chronic colonization   -Patient with acute delirium.  Will check urine analysis if positive consider treatment    Cystic adnexal lesion  -Incidental finding on CT  -Will need outpatient follow-up imaging    GERD  History of GI bleed:    -takes omeprazole daily, use pantoprazole per formulary.     MDD  Anxiety:  - Resume sertraline 100 mg daily.     Miscellaneous please review and reorder meds once reconciled     DVT Prophylaxis: Pneumatic Compression Devices  Code Status: DNR / DNI        Disposition: Expected discharge in 1 to 2 days    Primary Care Physician   DINORAH DISLA    Chief Complaint   Increased confusion and recent diagnosis of COVID-19    Unable to obtain a history from the patient due to confusion.  History obtained from ED provider patient's records and patient's son.    History of Present Illness   Daya Ignacio is a 89 year old female who presents with PMH including HTN, NSTEMI, MDD, anxiety, mild cognitive impairment, anemia, chronic back pain from compression fractures, UTI, GI bleed, nephrolithiasis, C. Difficile, recent COVID-19 diagnosis presents with acute  encephalopathy, dehydration, and medication non-compliance, including missing doses of Dilaudid for chronic pain and Paxlovid for COVID-19. She has a history of chronic pain from compression fractures, recent C. difficile colitis, and chronic colonization with ESBL and VRE.     Patient is sent from TCU due to increased lethargy weakness and episodes of lucid hallucinations.  Per patient's son patient was doing relatively well 3 days prior to hospitalization when patient had visual hallucinations which initially he thought was some bad dream and later patient continued to talk about seeing things on TV when the TV was not even on.  She was reluctant refusing her meds was not even taking her pain meds, she may have been behind on her blood pressure meds as well per patient's son.  A day later patient was checked for COVID-19 and was positive.  She was started on Paxlovid at that time.  Patient was increasingly lethargic and weak and not eating or drinking.  She was sent for further evaluation due to change in her behavior, confusion and poor poor oral intake.  Patient is confused unable to get more than 10 point review of systems.    Dilation in the emergency room was negative for acute infection, UA is pending, CT scans ruled out surgical issues and significant brain abnormalities.  Admitted for resuming medications, IV hydration, pain management, and close monitoring for mental status and possible infection      Past Medical History    Past Medical History:   Diagnosis Date    Anemia     Anxiety     Arthritis     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 (H28) 04/16/2014    Chronic low back pain     Depression     h/o Clostridium difficile colitis 06/2019    h/o Mumps     Hypertension     Kidney stone     Lumbar compression fracture     Macular degeneration (senile) of retina, unspecified     Mild cognitive impairment     NSTEMI (non-ST elevated myocardial infarction) (H) 05/28/2017    Recurrent UTI     Seasonal  allergic rhinitis     Upper GI bleed 06/2019    Urinary tract infection due to extended-spectrum beta lactamase (ESBL)-producing Klebsiella 05/2019    resistant to Bactrim         Past Surgical History   Past Surgical History:   Procedure Laterality Date    ARTHROPLASTY HIP Right     Cataract removal NOS Bilateral     COMBINED CYSTOSCOPY, INSERT STENT URETER(S) Left 05/06/2020    Procedure: Cystoscopy with left ureteral stent insertion;  Surgeon: Gaurav Munson MD;  Location: [facility] OR    CYSTOSCOPY N/A 08/01/2019    Procedure: Exam under anesthesia, video cystopanendoscopy;  Surgeon: Dennis Carlson MD;  Location: [facility] OR    ESOPHAGOSCOPY, GASTROSCOPY, DUODENOSCOPY (EGD), COMBINED N/A 06/11/2019    Procedure: ESOPHAGOGASTRODUODENOSCOPY (EGD);  Surgeon: Gaurav Degroot MD;  Location: [facility] GI    EXTRACORPOREAL SHOCK WAVE LITHOTRIPSY (ESWL) Left 05/28/2020    Procedure: LEFT EXTRACORPOREAL SHOCK WAVE LITHOTRIPSY;  Surgeon: Gaurav Munson MD;  Location:  OR    LASER HOLMIUM LITHOTRIPSY URETER(S), INSERT STENT, COMBINED Left 10/30/2020    Procedure: Cystoscopy, left ureteral stent exchange, left retrograde pyelogram, interpretation of fluoroscopic images, left ureteroscopy with holmium lithotripsy and stone basketing;  Surgeon: Gaurav Munson MD;  Location: [facility] OR    OPEN REDUCTION INTERNAL FIXATION ANKLE Left 05/03/2024    Procedure: Left Mathias C lateral malleolus fracture open reduction and internal fixation with retrograde fibular nail;  Surgeon: Colin Evans MD;  Location: [facility] OR    OPEN REDUCTION INTERNAL FIXATION HIP Left     OPEN REDUCTION INTERNAL FIXATION RODDING INTRAMEDULLARY TIBIA Left 05/03/2024    Procedure: Left tibia shaft open reduction and internal fixation with intramedullary nail;  Surgeon: Colin Evans MD;  Location: [facility] OR    OPEN REDUCTION INTERNAL FIXATION TIBIAL PLATEAU Right 08/28/2019    Procedure: Open reduction internal fixation right proximal tibia  fracture;  Surgeon: Molina Pardo MD;  Location: [facility] OR    TONSILLECTOMY, ADENOIDECTOMY, COMBINED         Prior to Admission Medications   Prior to Admission Medications   Prescriptions Last Dose Informant Patient Reported? Taking?   Cholecalciferol (VITAMIN D3) 2000 UNITS [facility] Yes No   Sig: Take 2,000 Units by mouth daily    Ferrous Gluconate 324 (37.5 Fe) MG TABS  [facility] Yes No   Sig: Take 1 tablet by mouth daily   HYDROmorphone (DILAUDID) 2 MG tablet   No No   Sig: Take 1 tablet (2 mg) by mouth every 6 hours as needed for pain   Multiple Vitamins-Minerals (PRESERVISION AREDS) [facility] Yes No   Sig: Take 1 capsule by mouth 2 times daily    SENNA-docusate sodium (SENNA S) 8.6-50 MG tablet   No No   Sig: Take 1 tablet by mouth at bedtime   acetaminophen (TYLENOL) 500 MG tablet   Yes No   Sig: Take 1,000 mg by mouth 3 times daily   albuterol (PROVENTIL) (2.5 MG/3ML) 0.083% neb solution   No No   Sig: Take 1 vial (2.5 mg) by nebulization every 4 hours as needed for shortness of breath, wheezing or cough   amLODIPine (NORVASC) 2.5 MG tablet   No No   Sig: Take 1 tablet (2.5 mg) by mouth daily for 8 days, THEN 2 tablets (5 mg) daily for 90 days.   carvedilol (COREG) 12.5 MG tablet   Yes No   Sig: Take 12.5 mg by mouth 2 times daily (with meals)   cloNIDine (CATAPRES) 0.1 MG tablet   Yes No   Sig: Take 0.1 mg by mouth at bedtime   cyanocobalamin (VITAMIN B-12) 1000 MCG tablet   Yes No   Sig: Take 1,000 mcg by mouth daily   isosorbide dinitrate (ISORDIL) 10 MG tablet  Nursing Home No No   Sig: Take 1 tablet (10 mg) by mouth 3 times daily   lisinopril (ZESTRIL) 30 MG tablet  [facility] Yes No   Sig: Take 30 mg by mouth daily   methocarbamol (ROBAXIN) 500 MG tablet   Yes No   Sig: Take 0.5 tablets (250 mg) by mouth 3 times daily as needed   nirmatrelvir and ritonavir (PAXLOVID) 150 mg/100 mg therapy pack   No No   Sig: Take 2 tablets by mouth 2 times daily for 5 days   nystatin  (MYCOSTATIN) 768855 UNIT/GM external cream  Nursing Home Yes No   Sig: Apply topically 2 times daily as needed (rash)   ondansetron (ZOFRAN) 4 MG tablet  Nursing Home Yes No   Sig: Take 4 mg by mouth every 8 hours as needed for nausea   polyethylene glycol (MIRALAX/GLYCOLAX) packet  Nursing Home No No   Sig: Take 17 g by mouth daily as needed for constipation   sertraline (ZOLOFT) 100 MG tablet  Nursing Home Yes No   Sig: Take 100 mg by mouth daily   vancomycin (VANCOCIN) 125 MG capsule   No No   Sig: Take 1 capsule (125 mg) by mouth 4 times daily for 10 days, THEN 1 capsule (125 mg) 2 times daily for 30 days.      Facility-Administered Medications: None     Allergies   Allergies   Allergen Reactions    Atorvastatin Muscle Pain (Myalgia)    Nitrofurantoin Muscle Pain (Myalgia) and Unknown     Body aches      Penicillins Unknown    Sulfa Antibiotics Other (See Comments)     Tolerated Bactrim May 2019  Entire family has allergy to Sulfa    Sulfasalazine Unknown       Social History   Social History     Tobacco Use    Smoking status: Former     Types: Cigarettes    Smokeless tobacco: Never   Substance Use Topics    Alcohol use: No     Social History     Social History Narrative    Not on file         Family History   I have reviewed this patient's family history and updated it with pertinent information if needed.   Family History   Problem Relation Age of Onset    Alzheimer Disease Mother         mild    Cardiovascular Father         heart attack    Neurologic Disorder Brother 83        Parkinson's         Review of Systems   Unable to get more than 10 point review of system patient is lethargic weak and not responding to her questions    Physical Exam   Temp: 98.1  F (36.7  C) Temp src: Oral BP: 105/62 Pulse: 72   Resp: 20 SpO2: 94 %      Vital Signs with Ranges  Temp:  [97.7  F (36.5  C)-98.1  F (36.7  C)] 98.1  F (36.7  C)  Pulse:  [69-73] 72  Resp:  [15-20] 20  BP: (105-142)/(62-79) 105/62  SpO2:  [94 %-97 %] 94  "%  103 lbs 0 oz    GEN: Awake but confused somewhat agitated does not want any evaluation has to be redirected   HEENT:  Normocephalic/atraumatic, no scleral icterus, no nasal discharge, mouth dry.  CV:  Regular rate and rhythm, pansystolic murmur.  S1 + S2 noted, no S3 or S4.  LUNGS: Bilateral air entry clear to auscultation bilaterally without rales/rhonchi/wheezing/retractions.  Symmetric chest rise on inhalation noted.  ABD:  Active bowel sounds, soft, non-tender/non-distended.  No rebound/guarding/rigidity.  EXT:  No edema.  No cyanosis.  No joint synovitis noted.  SKIN:  Dry to touch, no exanthems noted in the visualized areas.  NEURO: Moving all extremities  Data   Data reviewed today:  I personally reviewed no images or EKG's today.    No results for input(s): \"PH\", \"PHV\", \"PO2\", \"PO2V\", \"SAT\", \"PCO2\", \"PCO2V\", \"HCO3\", \"HCO3V\" in the last 168 hours.  Recent Labs   Lab 06/21/24  2256   WBC 7.3   HGB 9.3*   HCT 27.5*   MCV 98        Recent Labs   Lab 06/21/24  2256   *   POTASSIUM 4.2   CHLORIDE 97*   CO2 22   ANIONGAP 13   *   BUN 21.2   CR 0.52   GFRESTIMATED 86   DAVE 10.1*     7-Day Micro Results       No results found for the last 168 hours.          GFR Estimate   Date Value Ref Range Status   06/21/2024 86 >60 mL/min/1.73m2 Final     Comment:     eGFR calculated using 2021 CKD-EPI equation.   06/13/2024 87 >60 mL/min/1.73m2 Final   06/06/2024 81 >60 mL/min/1.73m2 Final   05/28/2021 >60 >60 mL/min/1.73m2 Final   05/22/2021 76 >60 mL/min/[1.73_m2] Final     Comment:     Non  GFR Calc  Starting 12/18/2018, serum creatinine based estimated GFR (eGFR) will be   calculated using the Chronic Kidney Disease Epidemiology Collaboration   (CKD-EPI) equation.     05/16/2021 79 >60 mL/min/[1.73_m2] Final     Comment:     Non  GFR Calc  Starting 12/18/2018, serum creatinine based estimated GFR (eGFR) will be   calculated using the Chronic Kidney Disease Epidemiology " "Collaboration   (CKD-EPI) equation.     05/12/2021 79 >60 mL/min/[1.73_m2] Final     Comment:     Non  GFR Calc  Starting 12/18/2018, serum creatinine based estimated GFR (eGFR) will be   calculated using the Chronic Kidney Disease Epidemiology Collaboration   (CKD-EPI) equation.       GFR Estimate If Black   Date Value Ref Range Status   05/28/2021 >60 >60 mL/min/1.73m2 Final   05/22/2021 89 >60 mL/min/[1.73_m2] Final     Comment:      GFR Calc  Starting 12/18/2018, serum creatinine based estimated GFR (eGFR) will be   calculated using the Chronic Kidney Disease Epidemiology Collaboration   (CKD-EPI) equation.     05/16/2021 >90 >60 mL/min/[1.73_m2] Final     Comment:      GFR Calc  Starting 12/18/2018, serum creatinine based estimated GFR (eGFR) will be   calculated using the Chronic Kidney Disease Epidemiology Collaboration   (CKD-EPI) equation.     05/12/2021 >90 >60 mL/min/[1.73_m2] Final     Comment:      GFR Calc  Starting 12/18/2018, serum creatinine based estimated GFR (eGFR) will be   calculated using the Chronic Kidney Disease Epidemiology Collaboration   (CKD-EPI) equation.       Recent Labs   Lab 06/21/24 2256   *     Recent Labs   Lab 06/21/24 2256   HGB 9.3*     Recent Labs   Lab 06/21/24 2256   AST 8   ALT 7   ALKPHOS 93   BILITOTAL 1.2     No results for input(s): \"INR\" in the last 168 hours.  Recent Labs   Lab 06/21/24 2256   LACT 0.8     Recent Labs   Lab 06/21/24  2256   LIPASE 20     No results for input(s): \"TSH\" in the last 168 hours.  No results for input(s): \"TROPONIN\", \"TROPI\", \"TROPR\", \"TROPONINIS\" in the last 168 hours.    Invalid input(s): \"TROPT\", \"TROP\", \"TROPONINIES\", \"TNIH\"  No results for input(s): \"COLOR\", \"APPEARANCE\", \"URINEGLC\", \"URINEBILI\", \"URINEKETONE\", \"SG\", \"UBLD\", \"URINEPH\", \"PROTEIN\", \"UROBILINOGEN\", \"NITRITE\", \"LEUKEST\", \"RBCU\", \"WBCU\" in the last 168 hours.    Recent Results (from the past 24 " hour(s))   CT Abdomen Pelvis w Contrast   Result Value    Radiologist flags Ovarian cyst    Narrative    EXAM: CT ABDOMEN PELVIS W CONTRAST  LOCATION: [facility]  DATE: 6/22/2024    INDICATION: Diffuse abdominal pain and nausea.  COMPARISON: 5/26/2024.  TECHNIQUE: CT scan of the abdomen and pelvis was performed following injection of IV contrast. Multiplanar reformats were obtained. Dose reduction techniques were used.  CONTRAST: 53 mL Isovue-370.    FINDINGS:  Patient was imaged with arm(s) at side, limiting study quality.    LOWER CHEST: Atherosclerosis of coronary arteries and visualized thoracic aorta. Dense calcifications of the mitral annulus. Chronic scarring and bronchiolitis within the visualized lung bases.    HEPATOBILIARY: Mild hepatic steatosis. No suspicious hepatic lesions.    Cholelithiasis.    No intrahepatic or intrahepatic biliary ductal dilatation.    PANCREAS: Enhances normally. No peripancreatic inflammatory fat stranding.    SPLEEN: Enhances normally. Normal size.    ADRENAL GLANDS: Normal.    KIDNEYS: Both kidneys enhance symmetrically, without hydronephrosis.    Nonobstructing 7 mm left renal stone.    Urinary bladder is unremarkable.    PELVIC ORGANS: A bilobed, cystic left adnexal lesion measures up to 3.5 cm.    BOWEL: No evidence of acute gastrointestinal inflammation or obstruction. Diffuse partially liquefied colonic stool burden, suggestive of constipation. Colonic diverticulosis. Normal appendix.    No intraperitoneal free fluid or free air.    LYMPH NODES: No suspicious abdominal or pelvic lymphadenopathy.    VASCULATURE: No abdominal aortic aneurysm. Severe atheromatous disease.    MUSCULOSKELETAL: Severe osteopenia. Right total arthroplasty. Instrumented fracture of the proximal left femur. Healing, subacute to chronic fracture of the right iliac wing. Multiple old, healed pelvic fractures. Multiple unchanged thoracolumbar   compression deformities. Old  bilateral rib fractures.    OTHER: No additionally significant abnormalities.      Impression    IMPRESSION:   1.  Suspected constipation.  2.  Hepatic steatosis.  3.  Cholelithiasis.  4.  Nonobstructing 7 mm left renal stone.  5.  Colonic diverticulosis.  6.  Bilobed, cystic left adnexal lesion, measuring up to 3.5 cm. Nonemergent pelvic sonography follow-up is recommended.      [Recommend Follow Up: Ovarian cyst]    This report will be copied to the [facility] to ensure a provider acknowledges the finding.      CT Head w/o Contrast    Narrative    EXAM: CT HEAD W/O CONTRAST  LOCATION: [facility]  DATE: 6/22/2024    INDICATION: AMS, pain, increased hallucinations, decreased appetite  COMPARISON: 5/1/2024  TECHNIQUE: Routine CT Head without IV contrast. Multiplanar reformats. Dose reduction techniques were used.    FINDINGS:  INTRACRANIAL CONTENTS: No intracranial hemorrhage, extraaxial collection, or mass effect.  No CT evidence of acute infarct. Mild presumed chronic small vessel ischemic changes. Mild to moderate generalized volume loss. No hydrocephalus.     VISUALIZED ORBITS/SINUSES/MASTOIDS: Prior bilateral cataract surgery. Visualized portions of the orbits are otherwise unremarkable. Mild mucosal thickening scattered about the paranasal sinuses. No middle ear or mastoid effusion.    BONES/SOFT TISSUES: No acute abnormality.      Impression    IMPRESSION:  1.  No CT evidence for acute intracranial process.  2.  Brain atrophy and presumed chronic microvascular ischemic changes as above.

## 2024-06-22 NOTE — PLAN OF CARE
"Attempted to get patient to sit up and eat breakfast. Re-attempted medication administration as well. Patient refused stating, \"to stop pestering me and get out\" informed patient is in the hospital, patient asked \"am I leaving the hospital right now.\" Writer stated, \"No, we're still trying to get you better but you should eat and take your medication.\" Patient covered themselves back up with blankets and stated, \"If I'm not leaving then get out.\"    Goal Outcome Evaluation:      Plan of Care Reviewed With: patient    Overall Patient Progress: no changeOverall Patient Progress: no change    Outcome Evaluation: Refusing cares      "

## 2024-06-22 NOTE — ED NOTES
[facility]  ED Nurse Handoff Report    ED Chief complaint: Abdominal Pain  . ED Diagnosis:   Final diagnoses:   COVID-19   Hyponatremia   Chronic midline low back pain, unspecified whether sciatica present   Acute encephalopathy       Allergies:   Allergies   Allergen Reactions    Atorvastatin Muscle Pain (Myalgia)    Nitrofurantoin Muscle Pain (Myalgia) and Unknown     Body aches      Penicillins Unknown    Sulfa Antibiotics Other (See Comments)     Tolerated Bactrim May 2019  Entire family has allergy to Sulfa    Sulfasalazine Unknown       Code Status: DNR / DNI    Activity level - Baseline/Home:  assist of 2 and walker.  Activity Level - Current:   assist of 2 and walker.   Lift room needed: No.   Bariatric: No   Needed: No   Isolation: Yes.   Infection: Special precautions-covid +VRE, ESBL.     Respiratory status: Nasal cannula    Vital Signs (within 30 minutes):   Vitals:    06/21/24 2338 06/22/24 0058 06/22/24 0118 06/22/24 0140   BP: 106/60 118/53 103/54 111/73   Pulse: 78 78 72 77   Resp: 21 19 18 21   Temp:       TempSrc:       SpO2: 96% 98% 98% 98%   Weight:       Height:           Cardiac Rhythm:  ,      Pain level:    Patient confused: No.   Patient Falls Risk: arm band in place, patient and family education, assistive device/personal items within reach, and activity supervised.   Elimination Status: Has voided     Patient Report - Initial Complaint: see ED triage note.   Focused Assessment: pt's pain improved after 2 doses of morphine, straight cath for urine-most likely infection      Abnormal Results:   Labs Ordered and Resulted from Time of ED Arrival to Time of ED Departure   COMPREHENSIVE METABOLIC PANEL - Abnormal       Result Value    Sodium 132 (*)     Potassium 4.2      Carbon Dioxide (CO2) 22      Anion Gap 13      Urea Nitrogen 21.2      Creatinine 0.52      GFR Estimate 86      Calcium 10.1 (*)     Chloride 97 (*)     Glucose 100 (*)     Alkaline  Phosphatase 93      AST 8      ALT 7      Protein Total 6.9      Albumin 3.7      Bilirubin Total 1.2     CBC WITH PLATELETS AND DIFFERENTIAL - Abnormal    WBC Count 7.3      RBC Count 2.82 (*)     Hemoglobin 9.3 (*)     Hematocrit 27.5 (*)     MCV 98      MCH 33.0      MCHC 33.8      RDW 17.4 (*)     Platelet Count 293      % Neutrophils 60      % Lymphocytes 26      % Monocytes 11      % Eosinophils 2      % Basophils 0      % Immature Granulocytes 1      NRBCs per 100 WBC 0      Absolute Neutrophils 4.4      Absolute Lymphocytes 1.9      Absolute Monocytes 0.8      Absolute Eosinophils 0.2      Absolute Basophils 0.0      Absolute Immature Granulocytes 0.0      Absolute NRBCs 0.0     LIPASE - Normal    Lipase 20     MAGNESIUM - Normal    Magnesium 1.9     LACTIC ACID WHOLE BLOOD WITH 1X REPEAT IN 2 HR WHEN >2 - Normal    Lactic Acid, Initial 0.8     ROUTINE UA WITH MICROSCOPIC REFLEX TO CULTURE        CT Head w/o Contrast   Final Result   IMPRESSION:   1.  No CT evidence for acute intracranial process.   2.  Brain atrophy and presumed chronic microvascular ischemic changes as above.      CT Abdomen Pelvis w Contrast   Final Result   IMPRESSION:    1.  Suspected constipation.   2.  Hepatic steatosis.   3.  Cholelithiasis.   4.  Nonobstructing 7 mm left renal stone.   5.  Colonic diverticulosis.   6.  Bilobed, cystic left adnexal lesion, measuring up to 3.5 cm. Nonemergent pelvic sonography follow-up is recommended.         [Recommend Follow Up: Ovarian cyst]      This report will be copied to the [facility] to ensure a provider acknowledges the finding.              Treatments provided: labs, imaging, meds  Family Comments: son @ bedside, will be going home  OBS brochure/video discussed/provided to patient:  Yes  ED Medications:   Medications   ondansetron (ZOFRAN) injection 4 mg (4 mg Intravenous $Given 6/22/24 0004)   morphine (PF) injection 4 mg (4 mg Intravenous $Given 6/22/24 0045)   sodium  chloride 0.9% BOLUS 1,000 mL (1,000 mLs Intravenous $New Bag 6/22/24 0004)   sodium chloride (PF) 0.9% PF flush 100 mL (52 mLs Intravenous $Given 6/22/24 0030)   iopamidol (ISOVUE-370) solution 500 mL (52 mLs Intravenous $Given 6/22/24 0030)       Drips infusing:  Yes  For the majority of the shift this patient was Green.   Interventions performed were labs, imaging, meds .    Sepsis treatment initiated: No    Cares/treatment/interventions/medications to be completed following ED care: per MD orders     ED Nurse Name: EDIE BONDS RN  1:56 AM  RECEIVING UNIT ED HANDOFF REVIEW    Above ED Nurse Handoff Report was reviewed: Yes  Reviewed by: Alisson Lorenzo RN on June 22, 2024 at 3:50 AM   I Maricruz called the ED to inform them the note was read: Yes

## 2024-06-22 NOTE — PLAN OF CARE
ROOM # 225    Living Situation (if not independent, order SW consult): From [facility]  Facility name:  : Unknown, patient refused to answer    Activity level at baseline: Assist of 2, walker   Activity level on admit: assist of 2    Who will be transporting you at discharge: Unknown, patient refused to answer    Patient registered to observation; given Patient Bill of Rights; given the opportunity to ask questions about observation status and their plan of care.  Patient has been oriented to the observation room, bathroom and call light is in place.    Discussed discharge goals and expectations with patient/family.

## 2024-06-22 NOTE — PLAN OF CARE
"PRIMARY DIAGNOSIS: Hyponatremia, Acute Encephalopathy, Chronic midline low back pain, COVID-19  OUTPATIENT/OBSERVATION GOALS TO BE MET BEFORE DISCHARGE:  1. Vital signs stable: Yes    3. Dyspnea improved and O2 sats >88% at RA or at prior home O2 therapy level: Yes      SpO2: 97 %, O2 Device: Nasal cannula (1/2LPM)    5. Tolerating adequate PO diet and medications: Yes    6. Return to near baseline physical activity: No    Discharge Planner Nurse   Safe discharge environment identified: Yes - Will go back to [facility]  Barriers to discharge: Yes       Entered by: Carissa Juan RN 06/22/2024 12:00PM   Patient is Alert to self only and very confused. Has been calm and cooperative after she woke up about 1200. Denies hallucination. C/o pain in left side/leg - given PRN Dilaudid with effectiveness. Will attempt to give AM medications (except Tylenol since due again at 1400) after patient eats food shortly. Brief changed with Ax2 in bed. Attempted to get patient to sit at bedside to assist to bathroom, patient unable to bear weight stating \"it's too painful.\" PW in place. PIV - SL. LS - diminished with expiratory wheezing but no coughing or shortness of breath noted. Patient denies feeling SOB - currently weaned to 1/2 LPM of O2. Denies N/V. Reports being hungry, food ordered. Patient cannot recall when LBM was - BS audible but hypoactive. Plan: Get patient to take medications and eat food. PT Consulted - will transfer back to [facility] when ready.  Please review provider order for any additional goals.   Nurse to notify provider when observation goals have been met and patient is ready for discharge.  Problem: Adult Inpatient Plan of Care  Goal: Plan of Care Review  Description: The Plan of Care Review/Shift note should be completed every shift.  The Outcome Evaluation is a brief statement about your assessment that the patient is improving, declining, or no change.  This information will be displayed automatically on " "your shift  note.  6/22/2024 1326 by Carissa Juan, RN  Outcome: Progressing  Flowsheets (Taken 6/22/2024 1326)  Outcome Evaluation:   on 1/2LPM O2, no cough - afebrile   Alert to self only  Plan of Care Reviewed With: patient  Overall Patient Progress: improving  6/22/2024 1002 by Carissa Juan, RN  Outcome: Not Progressing  Flowsheets (Taken 6/22/2024 0800)  Outcome Evaluation: Refusing cares  Plan of Care Reviewed With: patient  Overall Patient Progress: no change  Goal: Patient-Specific Goal (Individualized)  Description: You can add care plan individualizations to a care plan. Examples of Individualization might be:  \"Parent requests to be called daily at 9am for status\", \"I have a hard time hearing out of my right ear\", or \"Do not touch me to wake me up as it startles  me\".  6/22/2024 1326 by Carissa Juan, RN  Outcome: Progressing  6/22/2024 1002 by Carissa Juan, RN  Outcome: Not Progressing  Goal: Absence of Hospital-Acquired Illness or Injury  6/22/2024 1326 by Carissa Juan, RN  Outcome: Progressing  6/22/2024 1002 by Carissa Juan, RN  Outcome: Not Progressing  Intervention: Identify and Manage Fall Risk  Recent Flowsheet Documentation  Taken 6/22/2024 1200 by Carissa Juan, RN  Safety Promotion/Fall Prevention: safety round/check completed  Taken 6/22/2024 0800 by Carissa Juan, RN  Safety Promotion/Fall Prevention: safety round/check completed  Intervention: Prevent Skin Injury  Recent Flowsheet Documentation  Taken 6/22/2024 1200 by Carissa Juan, RN  Body Position:   left   weight shifting  Taken 6/22/2024 0800 by Carissa Juan, RN  Body Position: supine, head elevated  Intervention: Prevent Infection  Recent Flowsheet Documentation  Taken 6/22/2024 1200 by Carissa uJan, RN  Infection Prevention:   hand hygiene promoted   personal protective equipment utilized   rest/sleep promoted   single patient room provided  Taken 6/22/2024 0800 " by Carissa Juan RN  Infection Prevention:   hand hygiene promoted   personal protective equipment utilized   rest/sleep promoted   single patient room provided  Goal: Optimal Comfort and Wellbeing  6/22/2024 1326 by Carissa Juan RN  Outcome: Progressing  6/22/2024 1002 by Carissa Juan RN  Outcome: Not Progressing  Goal: Readiness for Transition of Care  6/22/2024 1326 by Carissa Juan RN  Outcome: Progressing  6/22/2024 1002 by Carissa Juan RN  Outcome: Not Progressing     Problem: Pain Acute  Goal: Optimal Pain Control and Function  6/22/2024 1326 by Carissa Juan RN  Outcome: Progressing  6/22/2024 1002 by Carissa Juan RN  Outcome: Not Progressing  Intervention: Prevent or Manage Pain  Recent Flowsheet Documentation  Taken 6/22/2024 1200 by Carissa Juan RN  Medication Review/Management: medications reviewed  Taken 6/22/2024 0800 by Carissa Juan RN  Medication Review/Management: medications reviewed     Problem: Skin Injury Risk Increased  Goal: Skin Health and Integrity  Outcome: Progressing  Intervention: Plan: Nurse Driven Intervention: Moisture Management  Recent Flowsheet Documentation  Taken 6/22/2024 1200 by Carissa Juan RN  Moisture Interventions:   Incontinence pad   Urinary collection device   Perineal cleanser  Bathing/Skin Care: incontinence care  Intervention: Plan: Nurse Driven Intervention: Friction and Shear  Recent Flowsheet Documentation  Taken 6/22/2024 1200 by Carissa Juan RN  Friction/Shear Interventions: HOB 30 degrees or less  Intervention: Optimize Skin Protection  Recent Flowsheet Documentation  Taken 6/22/2024 1200 by Carissa Juan RN  Activity Management: activity adjusted per tolerance  Head of Bed (HOB) Positioning: HOB at 30-45 degrees  Taken 6/22/2024 0800 by Carissa Juan RN  Head of Bed (HOB) Positioning: HOB at 15 degrees     Goal Outcome Evaluation:      Plan of Care Reviewed With:  patient    Overall Patient Progress: improvingOverall Patient Progress: improving    Outcome Evaluation: on 1/2LPM O2, no cough - afebrile; Alert to self only

## 2024-06-22 NOTE — UTILIZATION REVIEW
[facility] Utilization Review  Admission Status; Secondary Review Determination     Admission Date: 6/21/2024 10:09 PM      Under the authority of the Utilization Management Committee, the utilization review process indicated a secondary review on the above patient.  The review outcome is based on review of the medical records, discussions with staff, and applying clinical experience noted on the date of the review.        (X)      Inpatient Status Appropriate - This patient's medical care is consistent with medical management for inpatient care and reasonable inpatient medical practice.          RATIONALE FOR DETERMINATION   Daya Ignacio is a 89 year old female with history of CAD, anemia, nephrolithiasis, UTI, who presented with acute confusion, dehydration.  Recent diagnosis of COVID-19 infection.  She is registered to observation with acute encephalopathy secondary to complicated UTI, COVID-19 infection with hypoxia.  She is started on supplemental oxygenation, IV antibiotics, urine culture for analysis, supportive cares and close monitoring.  She continues to be confused despite intervention and requiring supplemental oxygenation.  Cares are rendered complex due to advanced age, significant comorbidities, suboptimal clinical improvement with anticipated length of stay more than 2 midnights, criteria for inpatient admission is met.  Recommendation is communicated to the primary team (Dr. Quintana).).        The information on this document is developed by the utilization review team in order for the business office to ensure compliance.  This only denotes the appropriateness of proper admission status and does not reflect the quality of care rendered.              Sincerely,       Jan Wright MD, MS  Physician Advisor  Utilization Review-[place]    Phone: [phone]

## 2024-06-22 NOTE — PROGRESS NOTES
Observation unit cross cover note:    Notified by RN that patient has had poor oral intake throughout the day.  Was supposed to be on IV fluids but this morning was refusing.  Patient is now okay with IV fluids.  Will resume gentle IV fluids overnight at 75 an hour.  A.m. provider to reassess if fluid should be continued.    GABI Ortez PA-C on 6/22/2024 at 6:34 PM

## 2024-06-23 ENCOUNTER — HEALTH MAINTENANCE LETTER (OUTPATIENT)
Age: 89
End: 2024-06-23

## 2024-06-23 LAB — BACTERIA UR CULT: ABNORMAL

## 2024-06-23 PROCEDURE — 99233 SBSQ HOSP IP/OBS HIGH 50: CPT | Performed by: INTERNAL MEDICINE

## 2024-06-23 PROCEDURE — 250N000011 HC RX IP 250 OP 636: Mod: JZ | Performed by: INTERNAL MEDICINE

## 2024-06-23 PROCEDURE — 999N000147 HC STATISTIC PT IP EVAL DEFER

## 2024-06-23 PROCEDURE — 250N000013 HC RX MED GY IP 250 OP 250 PS 637: Performed by: INTERNAL MEDICINE

## 2024-06-23 PROCEDURE — 120N000001 HC R&B MED SURG/OB

## 2024-06-23 RX ORDER — CARVEDILOL 3.12 MG/1
3.12 TABLET ORAL 2 TIMES DAILY WITH MEALS
Status: DISCONTINUED | OUTPATIENT
Start: 2024-06-23 | End: 2024-06-24

## 2024-06-23 RX ORDER — METHOCARBAMOL 500 MG/1
250 TABLET ORAL 3 TIMES DAILY PRN
Status: DISCONTINUED | OUTPATIENT
Start: 2024-06-23 | End: 2024-06-24 | Stop reason: HOSPADM

## 2024-06-23 RX ADMIN — CARVEDILOL 3.12 MG: 3.12 TABLET, FILM COATED ORAL at 18:48

## 2024-06-23 RX ADMIN — VANCOMYCIN HYDROCHLORIDE 125 MG: 125 CAPSULE ORAL at 20:19

## 2024-06-23 RX ADMIN — ACETAMINOPHEN 1000 MG: 500 TABLET, FILM COATED ORAL at 09:01

## 2024-06-23 RX ADMIN — HYDROMORPHONE HYDROCHLORIDE 1 MG: 2 TABLET ORAL at 20:32

## 2024-06-23 RX ADMIN — ACETAMINOPHEN 1000 MG: 500 TABLET, FILM COATED ORAL at 20:19

## 2024-06-23 RX ADMIN — SERTRALINE 100 MG: 100 TABLET, FILM COATED ORAL at 09:02

## 2024-06-23 RX ADMIN — ACETAMINOPHEN 1000 MG: 500 TABLET, FILM COATED ORAL at 16:45

## 2024-06-23 RX ADMIN — SENNOSIDES AND DOCUSATE SODIUM 1 TABLET: 50; 8.6 TABLET ORAL at 20:19

## 2024-06-23 RX ADMIN — VANCOMYCIN HYDROCHLORIDE 125 MG: 125 CAPSULE ORAL at 09:02

## 2024-06-23 RX ADMIN — NIRMATRELVIR AND RITONAVIR 2 TABLET: KIT at 20:22

## 2024-06-23 RX ADMIN — NIRMATRELVIR AND RITONAVIR 2 TABLET: KIT at 09:01

## 2024-06-23 RX ADMIN — CEFTRIAXONE 1 G: 1 INJECTION, POWDER, FOR SOLUTION INTRAMUSCULAR; INTRAVENOUS at 04:58

## 2024-06-23 ASSESSMENT — ACTIVITIES OF DAILY LIVING (ADL)
ADLS_ACUITY_SCORE: 50
ADLS_ACUITY_SCORE: 50
ADLS_ACUITY_SCORE: 54
ADLS_ACUITY_SCORE: 50
ADLS_ACUITY_SCORE: 54
ADLS_ACUITY_SCORE: 50
ADLS_ACUITY_SCORE: 54
ADLS_ACUITY_SCORE: 50

## 2024-06-23 NOTE — PLAN OF CARE
PRIMARY DIAGNOSIS: Hyponatremia, Acute Encephalopathy, Chronic midline low back pain, COVID-19   OUTPATIENT/OBSERVATION GOALS TO BE MET BEFORE DISCHARGE:  1. Vital signs stable: Yes    2. Dyspnea improved and O2 sats >88% at RA or at prior home O2 therapy level: Yes      SpO2: 98 %, O2 Device: Nasal cannula 1/2 LPM, drops when napping    3. Short term supplemental O2 needed for use with activity at home:  Per son has intermittent PRN O2 at TCU she uses    4. Tolerating adequate PO diet and medications: Yes    5. Return to near baseline physical activity: No, Per son NWB with ortho follow up on Tuesday for weightbearing upgrade. At current baseline per TCU of bedrest    Discharge Planner Nurse   Safe discharge environment identified: Yes  Barriers to discharge: Yes       Entered by: Carissa Juan RN 06/22/2024 4:00PM   Patient is A&Ox4, with intermittent confusion. Has been calm and cooperative all afternoon. C/o pain in lower back (chronic) relieved some with earlier PRN Dilaudid, Scheduled Tylenol, and repositioning. PW in place. PIV - SL. LS - diminished with expiratory wheezing but no coughing or shortness of breath noted. Patient denies feeling SOB - currently weaned to 1/2 LPM of O2. Tried RA - tolerated when awake but drops to mid 80s when sleeping. Has been stable on 1/2LPM for intermittent napping. Denies N/V. Plan: Will transfer back to [facility] when stable. Continue with pain management and encouraging oral food and fluid intake.     Please review provider order for any additional goals.   Nurse to notify provider when observation goals have been met and patient is ready for discharge.  Problem: Adult Inpatient Plan of Care  Goal: Plan of Care Review  Description: The Plan of Care Review/Shift note should be completed every shift.  The Outcome Evaluation is a brief statement about your assessment that the patient is improving, declining, or no change.  This information will be displayed automatically  "on your shift  note.  6/22/2024 1942 by Carissa Juan, RN  Outcome: Progressing  Flowsheets (Taken 6/22/2024 1600)  Outcome Evaluation: A&Ox4, 1/2LPM O2 (drops occasionally when sleeping), remains afebrile, pleasant and cooperative all afternoon  Plan of Care Reviewed With: patient  Overall Patient Progress: improving  6/22/2024 1326 by Carissa Juan, RN  Outcome: Progressing  Flowsheets (Taken 6/22/2024 1326)  Outcome Evaluation:   on 1/2LPM O2, no cough - afebrile   Alert to self only  Plan of Care Reviewed With: patient  Overall Patient Progress: improving  6/22/2024 1002 by Carissa Juan, RN  Outcome: Not Progressing  Flowsheets (Taken 6/22/2024 0800)  Outcome Evaluation: Refusing cares  Plan of Care Reviewed With: patient  Overall Patient Progress: no change  Goal: Patient-Specific Goal (Individualized)  Description: You can add care plan individualizations to a care plan. Examples of Individualization might be:  \"Parent requests to be called daily at 9am for status\", \"I have a hard time hearing out of my right ear\", or \"Do not touch me to wake me up as it startles  me\".  6/22/2024 1942 by Carissa Juan, RN  Outcome: Progressing  6/22/2024 1326 by Carissa Juan, RN  Outcome: Progressing  6/22/2024 1002 by Carissa Juan, RN  Outcome: Not Progressing  Goal: Absence of Hospital-Acquired Illness or Injury  6/22/2024 1942 by Carissa Juan, RN  Outcome: Progressing  6/22/2024 1326 by Carissa Juan, RN  Outcome: Progressing  6/22/2024 1002 by Carissa Juan, RN  Outcome: Not Progressing  Intervention: Identify and Manage Fall Risk  Recent Flowsheet Documentation  Taken 6/22/2024 1600 by Carissa Juan, RN  Safety Promotion/Fall Prevention: safety round/check completed  Taken 6/22/2024 1200 by Carissa Juan, RN  Safety Promotion/Fall Prevention: safety round/check completed  Taken 6/22/2024 0800 by Carissa Juan, RN  Safety Promotion/Fall Prevention: " safety round/check completed  Intervention: Prevent Skin Injury  Recent Flowsheet Documentation  Taken 6/22/2024 1600 by Carissa Juan RN  Body Position:   right   weight shifting  Taken 6/22/2024 1200 by Carissa Juan RN  Body Position:   left   weight shifting  Taken 6/22/2024 0800 by Carissa Juan RN  Body Position: supine, head elevated  Intervention: Prevent Infection  Recent Flowsheet Documentation  Taken 6/22/2024 1600 by Carissa Juan RN  Infection Prevention:   hand hygiene promoted   personal protective equipment utilized   rest/sleep promoted   single patient room provided  Taken 6/22/2024 1200 by Carissa Juan RN  Infection Prevention:   hand hygiene promoted   personal protective equipment utilized   rest/sleep promoted   single patient room provided  Taken 6/22/2024 0800 by Carissa Juan RN  Infection Prevention:   hand hygiene promoted   personal protective equipment utilized   rest/sleep promoted   single patient room provided  Goal: Optimal Comfort and Wellbeing  6/22/2024 1942 by Carissa Juan RN  Outcome: Progressing  6/22/2024 1326 by Carissa Juan RN  Outcome: Progressing  6/22/2024 1002 by Carissa Juan RN  Outcome: Not Progressing  Intervention: Monitor Pain and Promote Comfort  Recent Flowsheet Documentation  Taken 6/22/2024 1600 by Carissa Juan RN  Pain Management Interventions:   medication (see MAR)   pillow support provided   rest   repositioned   quiet environment facilitated  Goal: Readiness for Transition of Care  6/22/2024 1942 by Carissa Juan, RN  Outcome: Progressing  6/22/2024 1326 by Carissa Juan, RN  Outcome: Progressing  6/22/2024 1002 by Carissa Juan RN  Outcome: Not Progressing  Intervention: Mutually Develop Transition Plan  Recent Flowsheet Documentation  Taken 6/22/2024 1100 by Carissa Juan RN  Equipment Currently Used at Home: walker, rolling     Problem: Pain Acute  Goal:  Optimal Pain Control and Function  6/22/2024 1942 by Carissa Juan RN  Outcome: Progressing  6/22/2024 1326 by Carissa Juan RN  Outcome: Progressing  6/22/2024 1002 by Carissa Juan RN  Outcome: Not Progressing  Intervention: Prevent or Manage Pain  Recent Flowsheet Documentation  Taken 6/22/2024 1600 by Carissa Juan RN  Medication Review/Management: medications reviewed  Taken 6/22/2024 1200 by Carissa Juan RN  Medication Review/Management: medications reviewed  Taken 6/22/2024 0800 by Carissa Juan RN  Medication Review/Management: medications reviewed     Problem: Skin Injury Risk Increased  Goal: Skin Health and Integrity  6/22/2024 1942 by Carissa Juan RN  Outcome: Progressing  6/22/2024 1326 by Carissa Juan RN  Outcome: Progressing  Intervention: Plan: Nurse Driven Intervention: Moisture Management  Recent Flowsheet Documentation  Taken 6/22/2024 1200 by Carissa Juan RN  Moisture Interventions:   Incontinence pad   Urinary collection device   Perineal cleanser  Bathing/Skin Care: incontinence care  Intervention: Plan: Nurse Driven Intervention: Friction and Shear  Recent Flowsheet Documentation  Taken 6/22/2024 1200 by Carissa Juan RN  Friction/Shear Interventions: HOB 30 degrees or less  Intervention: Optimize Skin Protection  Recent Flowsheet Documentation  Taken 6/22/2024 1600 by Carissa Juan RN  Head of Bed (HOB) Positioning: HOB at 20-30 degrees  Taken 6/22/2024 1200 by Carissa Juan RN  Activity Management: activity adjusted per tolerance  Head of Bed (HOB) Positioning: HOB at 30-45 degrees  Taken 6/22/2024 0800 by Carissa Juan RN  Activity Management: patient refuses activity  Head of Bed (HOB) Positioning: HOB at 15 degrees     Goal Outcome Evaluation:      Plan of Care Reviewed With: patient    Overall Patient Progress: improvingOverall Patient Progress: improving    Outcome Evaluation: A&Ox4, 1/2LPM O2  (drops occasionally when sleeping), remains afebrile, pleasant and cooperative all afternoon

## 2024-06-23 NOTE — PLAN OF CARE
"Pt is A&Ox4, with intermittent confusion. VSS. NS running @ 75 ml/hr. Pt has purewick in place.     Problem: Adult Inpatient Plan of Care  Goal: Plan of Care Review  Description: The Plan of Care Review/Shift note should be completed every shift.  The Outcome Evaluation is a brief statement about your assessment that the patient is improving, declining, or no change.  This information will be displayed automatically on your shift  note.  Outcome: Progressing  Flowsheets (Taken 6/23/2024 0508)  Outcome Evaluation: PRN tylenol given for back pain. IV ceftriaxone given. Pt cooperative and at beginning of shift. Throughout night and in AM pt refusing cares and  yelling at staff  \"to get out\".  Goal: Patient-Specific Goal (Individualized)  Description: You can add care plan individualizations to a care plan. Examples of Individualization might be:  \"Parent requests to be called daily at 9am for status\", \"I have a hard time hearing out of my right ear\", or \"Do not touch me to wake me up as it startles  me\".  Outcome: Progressing  Goal: Absence of Hospital-Acquired Illness or Injury  Outcome: Progressing  Intervention: Identify and Manage Fall Risk  Recent Flowsheet Documentation  Taken 6/22/2024 2100 by Gila Seth RN  Safety Promotion/Fall Prevention:   activity supervised   safety round/check completed  Intervention: Prevent Skin Injury  Recent Flowsheet Documentation  Taken 6/22/2024 2100 by Gila Seth RN  Body Position: turned  Intervention: Prevent and Manage VTE (Venous Thromboembolism) Risk  Recent Flowsheet Documentation  Taken 6/22/2024 2100 by Gila Seth RN  VTE Prevention/Management: SCDs (sequential compression devices) off  Goal: Optimal Comfort and Wellbeing  Outcome: Progressing  Goal: Readiness for Transition of Care  Outcome: Progressing           Goal Outcome Evaluation:                 Outcome Evaluation: PRN tylenol given for back pain. IV ceftriaxone given. Pt cooperative and at beginning of " "shift. Throughout night and in AM pt refusing cares and  yelling at staff  \"to get out\".      "

## 2024-06-23 NOTE — PLAN OF CARE
Physical Therapy: Orders received. Chart reviewed and discussed with care team.? Physical Therapy not indicated due to pt having bedhold at TCU.? Defer discharge recommendations to medical team.? Will complete orders.

## 2024-06-23 NOTE — PROGRESS NOTES
[facility]    Medicine Progress Note - Hospitalist Service    Date of Admission:  6/21/2024    Assessment & Plan     Daya Ignacio is a  89-year-old female  with PMH including HTN, NSTEMI, MDD, anxiety, mild cognitive impairment, anemia, chronic back pain from compression fractures, UTI, GI bleed, nephrolithiasis, C. Difficile, recent COVID-19 diagnosis presents with acute encephalopathy, dehydration, and medication non-compliance, including missing doses of Dilaudid for chronic pain and Paxlovid for COVID-19. She has a history of chronic pain from compression fractures, recent C. difficile colitis, and chronic colonization with ESBL and VRE.     COVID-19 infection.  -Continue Paxlovid.    Urinary tract infection.  -Urine culture growing gram-negative rods.  -Continue IV ceftriaxone.    Acute infectious and likely toxic encephalopathy.  -Likely mostly due to COVID-19 infection and UTI.  -Is on several medications at baseline that could contribute.  -Treat infections as above.  -Decrease available dose of hydromorphone.  -Mental status does seem to be improving by 6/23.    Hypertension.  -Blood pressure had a initially been a bit low at presentation.  -Improved now.  -Restart carvedilol 3.125 mg twice a day with hold parameters.  -Continue to hold amlodipine, clonidine, isosorbide dinitrate, lisinopril.    Chronic pain syndrome.  -Pain medications as needed.  -She was quite sleepy during the day yesterday.  -Decrease available hydromorphone from 2 mg down to 1 mg every 6 hours as needed.    Depression.  -Continue sertraline 100 mg a day.    Cystic adnexal lesion.  -Incidental finding on CT scan.  -Plan for follow-up imaging in the outpatient setting.    Recent C. difficile colitis.  -Continue oral vancomycin 125 mg twice a day.    Hyponatremia.  -Mild.  -Sodium initially 132.  -Recheck metabolic panel tomorrow.    Chronic anemia.  -Hemoglobin appears near recent baseline.  -Recheck CBC  "tomorrow.    Suspected malnutrition.  -Quite thin.  -Registered dietitian consult tomorrow.          Diet: Combination Diet Low Saturated Fat Na <2400mg Diet    DVT Prophylaxis: Pneumatic Compression Devices  Almanza Catheter: Not present  Lines: None     Cardiac Monitoring: None  Code Status: No CPR- Do NOT Intubate      Clinically Significant Risk Factors Present on Admission                  # Hypertension: Noted on problem list             # Cachexia: Estimated body mass index is 16.62 kg/m  as calculated from the following:    Height as of this encounter: 1.676 m (5' 6\").    Weight as of this encounter: 46.7 kg (103 lb).       # Financial/Environmental Concerns:           Disposition Plan     Medically Ready for Discharge: Anticipated Tomorrow             Bk Munoz DO  Hospitalist Service  [facility]  Securely message with [employer] (more info)  Text page via [employer] Paging/Directory   ______________________________________________________________________    Interval History   Denies chest pain, shortness of breath, fevers, chills, nausea, vomiting.    Physical Exam   Vital Signs: Temp: 97.2  F (36.2  C) Temp src: Oral BP: 129/51 Pulse: 63   Resp: 16 SpO2: 92 % O2 Device: None (Room air) Oxygen Delivery: 1/2 LPM  Weight: 103 lbs 0 oz    Gen: Thin, NAD, A&Ox1 to self.  Trouble with place and time.  Eyes:  PERRL, sclera anicteric.  OP:  MMM, no lesions.  CV:  Regular, no murmurs.  Lung:  CTA b/l, normal effort.  Ab:  +BS, soft.  Skin:  Warm, dry to touch.  No rash.  Ext:  No pitting edema LE b/l.      Medical Decision Making       52 MINUTES SPENT BY ME on the date of service doing chart review, history, exam, documentation & further activities per the note.      Data         Imaging results reviewed over the past 24 hrs:   No results found for this or any previous visit (from the past 24 hour(s)).  "

## 2024-06-23 NOTE — PLAN OF CARE
Patient has had little to output and is not drinking fluids unless staff in room to assist her. Patient is voiding without difficulty. Requested IVF to be restarted since patient is more cooperative with cares and treatment. PIV infusing NaCl at 75ml.     Goal Outcome Evaluation:      Plan of Care Reviewed With: patient    Overall Patient Progress: improvingOverall Patient Progress: improving    Outcome Evaluation: A&Ox4, 1/2LPM O2 (drops occasionally when sleeping), remains afebrile, pleasant and cooperative all afternoon

## 2024-06-24 VITALS
RESPIRATION RATE: 16 BRPM | WEIGHT: 103 LBS | BODY MASS INDEX: 16.55 KG/M2 | SYSTOLIC BLOOD PRESSURE: 132 MMHG | HEIGHT: 66 IN | HEART RATE: 65 BPM | TEMPERATURE: 98 F | DIASTOLIC BLOOD PRESSURE: 57 MMHG | OXYGEN SATURATION: 100 %

## 2024-06-24 LAB
ANION GAP SERPL CALCULATED.3IONS-SCNC: 11 MMOL/L (ref 7–15)
BUN SERPL-MCNC: 10.3 MG/DL (ref 8–23)
CALCIUM SERPL-MCNC: 9.2 MG/DL (ref 8.2–9.6)
CHLORIDE SERPL-SCNC: 100 MMOL/L (ref 98–107)
CREAT SERPL-MCNC: 0.41 MG/DL (ref 0.51–0.95)
DEPRECATED HCO3 PLAS-SCNC: 23 MMOL/L (ref 22–29)
EGFRCR SERPLBLD CKD-EPI 2021: >90 ML/MIN/1.73M2
ERYTHROCYTE [DISTWIDTH] IN BLOOD BY AUTOMATED COUNT: 16.4 % (ref 10–15)
GLUCOSE SERPL-MCNC: 79 MG/DL (ref 70–99)
HCT VFR BLD AUTO: 24.1 % (ref 35–47)
HGB BLD-MCNC: 8 G/DL (ref 11.7–15.7)
MCH RBC QN AUTO: 32.8 PG (ref 26.5–33)
MCHC RBC AUTO-ENTMCNC: 33.2 G/DL (ref 31.5–36.5)
MCV RBC AUTO: 99 FL (ref 78–100)
PLATELET # BLD AUTO: 189 10E3/UL (ref 150–450)
POTASSIUM SERPL-SCNC: 3.8 MMOL/L (ref 3.4–5.3)
RBC # BLD AUTO: 2.44 10E6/UL (ref 3.8–5.2)
SODIUM SERPL-SCNC: 134 MMOL/L (ref 135–145)
WBC # BLD AUTO: 4.1 10E3/UL (ref 4–11)

## 2024-06-24 PROCEDURE — 250N000013 HC RX MED GY IP 250 OP 250 PS 637: Performed by: INTERNAL MEDICINE

## 2024-06-24 PROCEDURE — 85027 COMPLETE CBC AUTOMATED: CPT | Performed by: INTERNAL MEDICINE

## 2024-06-24 PROCEDURE — 80048 BASIC METABOLIC PNL TOTAL CA: CPT | Performed by: INTERNAL MEDICINE

## 2024-06-24 PROCEDURE — 99239 HOSP IP/OBS DSCHRG MGMT >30: CPT | Performed by: INTERNAL MEDICINE

## 2024-06-24 PROCEDURE — 250N000011 HC RX IP 250 OP 636: Mod: JZ | Performed by: INTERNAL MEDICINE

## 2024-06-24 PROCEDURE — 36415 COLL VENOUS BLD VENIPUNCTURE: CPT | Performed by: INTERNAL MEDICINE

## 2024-06-24 RX ORDER — CEPHALEXIN 500 MG/1
500 CAPSULE ORAL 2 TIMES DAILY
DISCHARGE
Start: 2024-06-25 | End: 2024-06-29

## 2024-06-24 RX ORDER — LISINOPRIL 10 MG/1
10 TABLET ORAL DAILY
DISCHARGE
Start: 2024-06-25 | End: 2024-07-11

## 2024-06-24 RX ORDER — CARVEDILOL 12.5 MG/1
12.5 TABLET ORAL 2 TIMES DAILY WITH MEALS
Status: DISCONTINUED | OUTPATIENT
Start: 2024-06-24 | End: 2024-06-24 | Stop reason: HOSPADM

## 2024-06-24 RX ORDER — HYDROMORPHONE HYDROCHLORIDE 2 MG/1
1 TABLET ORAL EVERY 6 HOURS PRN
Qty: 10 TABLET | Refills: 0 | Status: SHIPPED | OUTPATIENT
Start: 2024-06-24 | End: 2024-07-11

## 2024-06-24 RX ORDER — LISINOPRIL 10 MG/1
10 TABLET ORAL DAILY
Status: DISCONTINUED | OUTPATIENT
Start: 2024-06-24 | End: 2024-06-24 | Stop reason: HOSPADM

## 2024-06-24 RX ORDER — ISOSORBIDE DINITRATE 10 MG/1
10 TABLET ORAL
Status: DISCONTINUED | OUTPATIENT
Start: 2024-06-24 | End: 2024-06-24 | Stop reason: HOSPADM

## 2024-06-24 RX ADMIN — SENNOSIDES AND DOCUSATE SODIUM 1 TABLET: 50; 8.6 TABLET ORAL at 11:56

## 2024-06-24 RX ADMIN — LISINOPRIL 10 MG: 10 TABLET ORAL at 09:40

## 2024-06-24 RX ADMIN — METHOCARBAMOL 250 MG: 500 TABLET ORAL at 17:10

## 2024-06-24 RX ADMIN — CARVEDILOL 12.5 MG: 12.5 TABLET, FILM COATED ORAL at 17:09

## 2024-06-24 RX ADMIN — ISOSORBIDE DINITRATE 10 MG: 10 TABLET ORAL at 17:09

## 2024-06-24 RX ADMIN — VANCOMYCIN HYDROCHLORIDE 125 MG: 125 CAPSULE ORAL at 09:42

## 2024-06-24 RX ADMIN — CEFTRIAXONE 1 G: 1 INJECTION, POWDER, FOR SOLUTION INTRAMUSCULAR; INTRAVENOUS at 03:53

## 2024-06-24 RX ADMIN — ISOSORBIDE DINITRATE 10 MG: 10 TABLET ORAL at 11:59

## 2024-06-24 RX ADMIN — ACETAMINOPHEN 1000 MG: 500 TABLET, FILM COATED ORAL at 14:19

## 2024-06-24 RX ADMIN — HYDROMORPHONE HYDROCHLORIDE 1 MG: 2 TABLET ORAL at 11:56

## 2024-06-24 RX ADMIN — ISOSORBIDE DINITRATE 10 MG: 10 TABLET ORAL at 09:40

## 2024-06-24 RX ADMIN — ACETAMINOPHEN 1000 MG: 500 TABLET, FILM COATED ORAL at 09:40

## 2024-06-24 RX ADMIN — LIDOCAINE 1 PATCH: 4 PATCH TOPICAL at 09:16

## 2024-06-24 RX ADMIN — NIRMATRELVIR AND RITONAVIR 2 TABLET: KIT at 09:42

## 2024-06-24 RX ADMIN — SERTRALINE 100 MG: 100 TABLET, FILM COATED ORAL at 09:42

## 2024-06-24 RX ADMIN — CARVEDILOL 12.5 MG: 12.5 TABLET, FILM COATED ORAL at 09:42

## 2024-06-24 ASSESSMENT — ACTIVITIES OF DAILY LIVING (ADL)
ADLS_ACUITY_SCORE: 54
ADLS_ACUITY_SCORE: 49
ADLS_ACUITY_SCORE: 49
ADLS_ACUITY_SCORE: 54
ADLS_ACUITY_SCORE: 49
ADLS_ACUITY_SCORE: 49
ADLS_ACUITY_SCORE: 54
ADLS_ACUITY_SCORE: 56
ADLS_ACUITY_SCORE: 54
ADLS_ACUITY_SCORE: 54
ADLS_ACUITY_SCORE: 49
ADLS_ACUITY_SCORE: 54
ADLS_ACUITY_SCORE: 56
ADLS_ACUITY_SCORE: 54

## 2024-06-24 NOTE — PLAN OF CARE
"PRIMARY DIAGNOSIS: Hyponatremia, COVID-19, Chronic Midline Back Pain, Acute Encephalopathy  OUTPATIENT/OBSERVATION GOALS TO BE MET BEFORE DISCHARGE:  1. Vital signs stable: Yes    2. Dyspnea improved and O2 sats >88% at RA or at prior home O2 therapy level: Yes      SpO2: 94 %, O2 Device: None (Room air)    3. Short term supplemental O2 needed for use with activity at home: No    4. Tolerating adequate PO diet and medications: Yes    5. Return to near baseline physical activity: Yes - Currently Bedrest and NWB until follow up - scheduled on Tuesday 6/25 per family.    Discharge Planner Nurse   [facility] scheduled between [id number]  Barriers to discharge: No       Entered by: Carissa Juan RN 06/24/2024 12:00PM   Patient has had some anxiety and forgetfulness today, keeps repeating, \"I can't take care of myself.\" This started after writer [facility] patient [facility] Has reassured multiple times that she will have 24hr care there as well. Patient calms down but then begins making the same statements about not being able to care for herself. Had medium formed BM, some straining difficulty. Gave PRN Senna-S and Dilaudid to assist with lower back pain and bowel promotion. Plan: Transfer back to [facility], Stretcher Transport scheduled between [id number].  Please review provider order for any additional goals.   Nurse to notify provider when observation goals have been met and patient is ready for discharge.  Goal Outcome Evaluation:      Plan of Care Reviewed With: patient    Overall Patient Progress: no change    Outcome Evaluation: Awake and Alert      "

## 2024-06-24 NOTE — PLAN OF CARE
PRIMARY DIAGNOSIS: Hyponatremia, Acute Encephalopathy, Chronic midline low back pain, COVID-19  OUTPATIENT/OBSERVATION GOALS TO BE MET BEFORE DISCHARGE:  1. Stable vital signs Yes - BP meds adjusted to home dosing  2. Tolerating diet:Yes  3. Pain controlled with oral pain medications:  Yes and lidocaine patch  4. Positive bowel sounds:  Yes  5. Voiding without difficulty:  Yes  6. Able to ambulate:  No - NWB   7. Provider specific discharge goals met:  Yes    Discharge Planner Nurse   [facility]  Barriers to discharge: Yes       Entered by: Carissa Juan RN 06/24/2024 8:00AM   Patient is A&Ox3 with easily reoriented to being in the hospital. Intermittent confusion. Has been calm and cooperative this AM. C/o pain in lower back (chronic) given Tylenol and applied Lidocaine patch. Scheduled Tylenol, and repositioning. PW in place. PIV - SL. LS - diminished , clear, no coughing or shortness of breath noted. Patient denies feeling SOB - on RA. Denies N/V. Plan: Will transfer back to  TCU when stable, possibly today. Continue with pain management and encouraging oral food and fluid intake.   Please review provider order for any additional goals.   Nurse to notify provider when observation goals have been met and patient is ready for discharge.  Goal Outcome Evaluation:      Plan of Care Reviewed With: patient    Overall Patient Progress: improvingOverall Patient Progress: improving    Outcome Evaluation: Awake and alert this AM, Patient is calm and cooperative. napping intermittently; on RA, PIV S/L      Problem: Adult Inpatient Plan of Care  Goal: Plan of Care Review  Description: The Plan of Care Review/Shift note should be completed every shift.  The Outcome Evaluation is a brief statement about your assessment that the patient is improving, declining, or no change.  This information will be displayed automatically on your shift  note.  Outcome:  "Progressing  Flowsheets (Taken 6/24/2024 0800)  Outcome Evaluation:   Awake and alert this AM, Patient is calm and cooperative. napping intermittently   on RA, PIV S/L  Plan of Care Reviewed With: patient  Overall Patient Progress: improving  Goal: Patient-Specific Goal (Individualized)  Description: You can add care plan individualizations to a care plan. Examples of Individualization might be:  \"Parent requests to be called daily at 9am for status\", \"I have a hard time hearing out of my right ear\", or \"Do not touch me to wake me up as it startles  me\".  Outcome: Progressing  Goal: Absence of Hospital-Acquired Illness or Injury  Outcome: Progressing  Intervention: Identify and Manage Fall Risk  Recent Flowsheet Documentation  Taken 6/24/2024 0800 by Carissa Juan RN  Safety Promotion/Fall Prevention:   activity supervised   safety round/check completed   nonskid shoes/slippers when out of bed   patient and family education  Intervention: Prevent Skin Injury  Recent Flowsheet Documentation  Taken 6/24/2024 0942 by Carissa Juan, RN  Body Position: weight shifting  Taken 6/24/2024 0800 by Carissa Juan, RN  Device Skin Pressure Protection: absorbent pad utilized/changed  Intervention: Prevent and Manage VTE (Venous Thromboembolism) Risk  Recent Flowsheet Documentation  Taken 6/24/2024 0800 by Carissa Juan RN  VTE Prevention/Management: SCDs (sequential compression devices) off  Intervention: Prevent Infection  Recent Flowsheet Documentation  Taken 6/24/2024 0800 by Carissa Juan, RN  Infection Prevention:   hand hygiene promoted   personal protective equipment utilized   rest/sleep promoted   single patient room provided  Goal: Optimal Comfort and Wellbeing  Outcome: Progressing  Intervention: Monitor Pain and Promote Comfort  Recent Flowsheet Documentation  Taken 6/24/2024 0942 by Carissa Juan, RN  Pain Management Interventions: (scheduled Tylenol given)   medication (see " MAR)   repositioned   rest  Taken 6/24/2024 0833 by Carissa Juan, RN  Pain Management Interventions: (lidocaine patch applied)   medication (see MAR)   repositioned   rest   pillow support provided  Goal: Readiness for Transition of Care  Outcome: Progressing     Problem: Pain Acute  Goal: Optimal Pain Control and Function  Outcome: Progressing  Intervention: Prevent or Manage Pain  Recent Flowsheet Documentation  Taken 6/24/2024 0800 by Carissa Juan, RN  Sensory Stimulation Regulation:   care clustered   quiet environment promoted   television on  Medication Review/Management: medications reviewed     Problem: Skin Injury Risk Increased  Goal: Skin Health and Integrity  Outcome: Progressing  Intervention: Plan: Nurse Driven Intervention: Positioning  Recent Flowsheet Documentation  Taken 6/24/2024 0800 by Carissa Juan, RN  Plan: Positioning Interventions:   OFF-LOAD HEELS with pillows   HOB 30 degrees or less  Intervention: Plan: Nurse Driven Intervention: Moisture Management  Recent Flowsheet Documentation  Taken 6/24/2024 0942 by Carissa Juan, RN  Bathing/Skin Care: incontinence care  Taken 6/24/2024 0800 by Carissa Juan, RN  Moisture Interventions:   Incontinence pad   Perineal cleanser  Bathing/Skin Care:   incontinence care   moisturizer applied  Intervention: Plan: Nurse Driven Intervention: Friction and Shear  Recent Flowsheet Documentation  Taken 6/24/2024 0800 by Carissa Juan, RN  Friction/Shear Interventions: HOB 30 degrees or less  Intervention: Optimize Skin Protection  Recent Flowsheet Documentation  Taken 6/24/2024 0942 by Carissa Juan, RN  Activity Management: activity adjusted per tolerance  Head of Bed (HOB) Positioning: HOB at 20-30 degrees  Taken 6/24/2024 0800 by Carissa Juan, RN  Pressure Reduction Techniques: heels elevated off bed

## 2024-06-24 NOTE — PROGRESS NOTES
Care Management Discharge Note    Discharge Date: 06/24/2024     Discharge Disposition: Skilled Nursing Facility, Transitional Care    Discharge Services: Transportation Services    Discharge DME: None    Discharge Transportation: agency    Private pay costs discussed: transportation costs    PAS Confirmation Code:  [id number] (return)   Patient/family educated on Medicare website which has current facility and service quality ratings: no    Education Provided on the Discharge Plan: Yes  Persons Notified of Discharge Plans: Pt's son Gael, bedside RN, hospitalist, [facility]  Patient/Family in Agreement with the Plan: yes    Additional Information:  Patient is medically ready for discharge today back to [facility]. Confirmed with admissions dept at [facility] that patient does have a bed hold and is able to return today. Spoke with son Gael regarding transport and need for stretcher transport due to COVID-19+ status. Reviewed potential out of pocket cost for [facility] stretcher transport. Current base rate is $1228.70 and $28.67 per mile to the destination. Pt's son Gael expressed understanding and are agreeable to this.      Patient requires stretcher transportation due to COVID-19. Stretcher transportation has been arranged for today between [id number]. Patient's son and bedside nurse (who will notify patient) notified of transportation time. Ambulance PCS form completed and placed in patient chart. Ambulance PCS form should be given to transportation team.    Signed discharge orders and scripts have been faxed to [facility]. No further CM needs noted.         Preethi Zarate, RN  Care Coordinator  [facility]  [phone]

## 2024-06-24 NOTE — PLAN OF CARE
PRIMARY DIAGNOSIS: Hyponatremia, COVID-19, Chronic Midline Back Pain, Acute Encephalopathy     OUTPATIENT/OBSERVATION GOALS TO BE MET BEFORE DISCHARGE  1. Orthostatic performed: N/A    2. Tolerating PO medications: Yes    3. Return to near baseline physical activity: Yes - baseline upon admission; bedrest until cleared ortho, follow up 6/25 per family    4. Cleared for discharge by consultants (if involved): Yes    Discharge Planner Nurse   Safe discharge environment identified: Yes  Barriers to discharge: No       Entered by: Carissa Juan RN 06/24/2024 4:00PM   Patient stable, has had a couple episodes of tearfulness and wondering where things are or how she will take care of herself alone. Therapeutic reassurance provided and patient redirected which calmed patient. [facility] scheduled around [id number].  Please review provider order for any additional goals.   Nurse to notify provider when observation goals have been met and patient is ready for discharge.    Goal Outcome Evaluation:    Plan of Care Reviewed With: patient    Overall Patient Progress: improvingOverall Patient Progress: improving    Outcome Evaluation: Awake and alert, intermittent confusion; can be redirected with therapeutic touch and verbal reassurance      Problem: Adult Inpatient Plan of Care  Goal: Plan of Care Review  Description: The Plan of Care Review/Shift note should be completed every shift.  The Outcome Evaluation is a brief statement about your assessment that the patient is improving, declining, or no change.  This information will be displayed automatically on your shift  note.  6/24/2024 1821 by Carissa Juan, RN  Outcome: Progressing  Flowsheets (Taken 6/24/2024 1821)  Outcome Evaluation:   Awake and alert, intermittent confusion   can be redirected with therapeutic touch and verbal reassurance  Plan of Care Reviewed With: patient  Overall Patient Progress:  "improving  6/24/2024 1232 by Carissa Juan, RN  Outcome: Progressing  Flowsheets (Taken 6/24/2024 1200)  Outcome Evaluation: Awake and Alert  Plan of Care Reviewed With: patient  Overall Patient Progress: no change  6/24/2024 1034 by Carissa Juan, RN  Outcome: Progressing  Flowsheets (Taken 6/24/2024 0800)  Outcome Evaluation:   Awake and alert this AM, Patient is calm and cooperative. napping intermittently   on RA, PIV S/L  Plan of Care Reviewed With: patient  Overall Patient Progress: improving  Goal: Patient-Specific Goal (Individualized)  Description: You can add care plan individualizations to a care plan. Examples of Individualization might be:  \"Parent requests to be called daily at 9am for status\", \"I have a hard time hearing out of my right ear\", or \"Do not touch me to wake me up as it startles  me\".  6/24/2024 1821 by Carissa Juan, RN  Outcome: Progressing  6/24/2024 1232 by Carissa Juan, RN  Outcome: Progressing  6/24/2024 1034 by Carissa Juan, RN  Outcome: Progressing  Goal: Absence of Hospital-Acquired Illness or Injury  6/24/2024 1821 by Carissa Juan, RN  Outcome: Progressing  6/24/2024 1232 by Carissa Juan, RN  Outcome: Progressing  6/24/2024 1034 by Carissa Juan, RN  Outcome: Progressing  Intervention: Identify and Manage Fall Risk  Recent Flowsheet Documentation  Taken 6/24/2024 1600 by Carissa Juan, RN  Safety Promotion/Fall Prevention:   activity supervised   safety round/check completed   nonskid shoes/slippers when out of bed   patient and family education  Taken 6/24/2024 1200 by Carissa Juan, RN  Safety Promotion/Fall Prevention:   activity supervised   safety round/check completed   nonskid shoes/slippers when out of bed   patient and family education  Taken 6/24/2024 0800 by Carissa Juan, RN  Safety Promotion/Fall Prevention:   activity supervised   safety round/check completed   nonskid shoes/slippers when out of " bed   patient and family education  Intervention: Prevent Skin Injury  Recent Flowsheet Documentation  Taken 6/24/2024 1200 by Carissa Juan RN  Body Position:   legs elevated   weight shifting   sitting up in bed  Skin Protection: incontinence pads utilized  Device Skin Pressure Protection: absorbent pad utilized/changed  Taken 6/24/2024 0942 by Carissa Juan RN  Body Position: weight shifting  Taken 6/24/2024 0800 by Carissa Juan RN  Device Skin Pressure Protection: absorbent pad utilized/changed  Intervention: Prevent and Manage VTE (Venous Thromboembolism) Risk  Recent Flowsheet Documentation  Taken 6/24/2024 1600 by Carissa Juan RN  VTE Prevention/Management: SCDs (sequential compression devices) off  Taken 6/24/2024 1200 by Carissa Juan RN  VTE Prevention/Management: SCDs (sequential compression devices) off  Taken 6/24/2024 0800 by Carissa Juan RN  VTE Prevention/Management: SCDs (sequential compression devices) off  Intervention: Prevent Infection  Recent Flowsheet Documentation  Taken 6/24/2024 1600 by Carissa Juan RN  Infection Prevention:   hand hygiene promoted   personal protective equipment utilized   rest/sleep promoted   single patient room provided  Taken 6/24/2024 1200 by Carissa Juan RN  Infection Prevention:   hand hygiene promoted   personal protective equipment utilized   rest/sleep promoted   single patient room provided  Taken 6/24/2024 0800 by Carissa Juan RN  Infection Prevention:   hand hygiene promoted   personal protective equipment utilized   rest/sleep promoted   single patient room provided  Goal: Optimal Comfort and Wellbeing  6/24/2024 1821 by Carissa Juan RN  Outcome: Progressing  6/24/2024 1232 by Carissa Juan RN  Outcome: Progressing  6/24/2024 1034 by Carissa Juan RN  Outcome: Progressing  Intervention: Monitor Pain and Promote Comfort  Recent Flowsheet Documentation  Taken 6/24/2024  1606 by Carissa Juan RN  Pain Management Interventions:   pillow support provided   repositioned   rest   therapeutic touch   therapeutic presence  Taken 6/24/2024 1200 by Carissa Juan RN  Pain Management Interventions: (also had a bowel movement just now)   medication (see MAR)   repositioned  Taken 6/24/2024 0942 by Carissa Juan RN  Pain Management Interventions: (scheduled Tylenol given)   medication (see MAR)   repositioned   rest  Taken 6/24/2024 0833 by Carissa Juan RN  Pain Management Interventions: (lidocaine patch applied)   medication (see MAR)   repositioned   rest   pillow support provided  Goal: Readiness for Transition of Care  6/24/2024 1821 by Carissa Juan RN  Outcome: Progressing  6/24/2024 1232 by Carissa Juan RN  Outcome: Progressing  6/24/2024 1034 by Carissa Juan RN  Outcome: Progressing     Problem: Pain Acute  Goal: Optimal Pain Control and Function  6/24/2024 1821 by Carissa Juan RN  Outcome: Progressing  6/24/2024 1232 by Carissa Juan RN  Outcome: Progressing  6/24/2024 1034 by Carissa Juan RN  Outcome: Progressing  Intervention: Prevent or Manage Pain  Recent Flowsheet Documentation  Taken 6/24/2024 1600 by Carissa Juan, RN  Medication Review/Management: medications reviewed  Taken 6/24/2024 1200 by Carissa Juan RN  Sensory Stimulation Regulation:   care clustered   quiet environment promoted   visual stimulation minimized  Medication Review/Management: medications reviewed  Taken 6/24/2024 0800 by Carissa Juan RN  Sensory Stimulation Regulation:   care clustered   quiet environment promoted   television on  Medication Review/Management: medications reviewed     Problem: Skin Injury Risk Increased  Goal: Skin Health and Integrity  6/24/2024 1821 by Carissa Juan, RN  Outcome: Progressing  6/24/2024 1232 by Carissa Juan RN  Outcome: Progressing  6/24/2024 1034 by Drake  Carissa ROMERO RN  Outcome: Progressing  Intervention: Plan: Nurse Driven Intervention: Positioning  Recent Flowsheet Documentation  Taken 6/24/2024 1200 by Carissa Juan, RN  Plan: Positioning Interventions:   OFF-LOAD HEELS with pillows   HOB 30 degrees or less  Taken 6/24/2024 0800 by Carissa Juan RN  Plan: Positioning Interventions:   OFF-LOAD HEELS with pillows   HOB 30 degrees or less  Intervention: Plan: Nurse Driven Intervention: Moisture Management  Recent Flowsheet Documentation  Taken 6/24/2024 1200 by Carissa Juan RN  Bathing/Skin Care: incontinence care  Taken 6/24/2024 0942 by Carissa Juan RN  Bathing/Skin Care: incontinence care  Taken 6/24/2024 0800 by Carissa Juan, RN  Moisture Interventions:   Incontinence pad   Perineal cleanser  Bathing/Skin Care:   incontinence care   moisturizer applied  Intervention: Plan: Nurse Driven Intervention: Friction and Shear  Recent Flowsheet Documentation  Taken 6/24/2024 0800 by Carissa Juan RN  Friction/Shear Interventions: HOB 30 degrees or less  Intervention: Optimize Skin Protection  Recent Flowsheet Documentation  Taken 6/24/2024 1709 by Carissa Juan, RN  Activity Management:   bedrest   activity adjusted per tolerance  Taken 6/24/2024 1600 by Carissa Juan, RN  Activity Management:   bedrest   activity adjusted per tolerance  Taken 6/24/2024 1200 by Carissa Juan RN  Pressure Reduction Techniques: heels elevated off bed  Skin Protection: incontinence pads utilized  Activity Management:   bedrest   activity adjusted per tolerance  Head of Bed (HOB) Positioning: HOB at 30-45 degrees  Taken 6/24/2024 0942 by Carissa Juan, RN  Activity Management: activity adjusted per tolerance  Head of Bed (HOB) Positioning: HOB at 20-30 degrees  Taken 6/24/2024 0800 by Carissa Juan RN  Pressure Reduction Techniques: heels elevated off bed

## 2024-06-24 NOTE — PLAN OF CARE
"PRIMARY DIAGNOSIS: COVID infection  OUTPATIENT/OBSERVATION GOALS TO BE MET BEFORE DISCHARGE:  1. Pain Status: Improved-controlled with oral pain medications.    2. Return to near baseline physical activity: No    3. Cleared for discharge by consultants (if involved): No    Discharge Planner Nurse   Safe discharge environment identified: No  Barriers to discharge: Yes       Entered by: Teresa Madison RN 06/23/2024 8:05 PM   VSS, afeb. Pt. Here with UTI,confusion and covid.On paxlovid and ABX's.MIV Dc'd today and pt. Is being offered fluids often to maintain adequate hydration.Pt. pleasant all shift, napping between cares.Will con't to monitor and treat for covid and UTI.Await OK for transfer back to NH/TCU.  Problem: Adult Inpatient Plan of Care  Goal: Plan of Care Review  Description: The Plan of Care Review/Shift note should be completed every shift.  The Outcome Evaluation is a brief statement about your assessment that the patient is improving, declining, or no change.  This information will be displayed automatically on your shift  note.  Outcome: Progressing  Flowsheets (Taken 6/23/2024 1600)  Plan of Care Reviewed With: patient  Overall Patient Progress: improving  Goal: Patient-Specific Goal (Individualized)  Description: You can add care plan individualizations to a care plan. Examples of Individualization might be:  \"Parent requests to be called daily at 9am for status\", \"I have a hard time hearing out of my right ear\", or \"Do not touch me to wake me up as it startles  me\".  Outcome: Progressing  Goal: Absence of Hospital-Acquired Illness or Injury  Outcome: Progressing  Intervention: Identify and Manage Fall Risk  Recent Flowsheet Documentation  Taken 6/23/2024 0900 by Teresa Madison RN  Safety Promotion/Fall Prevention:   activity supervised   safety round/check completed  Intervention: Prevent and Manage VTE (Venous Thromboembolism) Risk  Recent Flowsheet Documentation  Taken 6/23/2024 0900 by " Teresa Madison, RN  VTE Prevention/Management: SCDs (sequential compression devices) off  Goal: Optimal Comfort and Wellbeing  Outcome: Progressing  Goal: Readiness for Transition of Care  Outcome: Not Progressing  Flowsheets (Taken 6/23/2024 1600)  Barriers to Discharge: Pt. COVID +.Reciving Paxlovid and is also on Vancomycin PO for c.diff colitis     Problem: Pain Acute  Goal: Optimal Pain Control and Function  Outcome: Not Progressing     Problem: Skin Injury Risk Increased  Goal: Skin Health and Integrity  Outcome: Progressing  Intervention: Plan: Nurse Driven Intervention: Moisture Management  Recent Flowsheet Documentation  Taken 6/23/2024 0900 by Teresa Madison RN  Moisture Interventions: Urinary collection device  Intervention: Plan: Nurse Driven Intervention: Friction and Shear  Recent Flowsheet Documentation  Taken 6/23/2024 0900 by Teresa Madison RN  Friction/Shear Interventions: HOB 30 degrees or less  Intervention: Optimize Skin Protection  Recent Flowsheet Documentation  Taken 6/23/2024 0900 by Teresa Madison, RN  Activity Management: activity adjusted per tolerance     Please review provider order for any additional goals.   Nurse to notify provider when observation goals have been met and patient is ready for discharge.Goal Outcome Evaluation:      Plan of Care Reviewed With: patient    Overall Patient Progress: improvingOverall Patient Progress: improving

## 2024-06-24 NOTE — DISCHARGE SUMMARY
"[facility]  Hospitalist Discharge Summary      Date of Admission:  6/21/2024  Date of Discharge:  6/24/2024  Discharging Provider: Bk Munoz DO  Discharge Service: Hospitalist Service    Discharge Diagnoses   COVID-19 infection.  Urinary tract infection.  Acute infectious and toxic encephalopathy.  Hypertension.  Chronic pain syndrome.  Depression.  Cystic adnexal lesion.  Recent C. difficile colitis.  Hyponatremia.      Clinically Significant Risk Factors     # Cachexia: Estimated body mass index is 16.62 kg/m  as calculated from the following:    Height as of this encounter: 1.676 m (5' 6\").    Weight as of this encounter: 46.7 kg (103 lb).       Follow-ups Needed After Discharge   Follow-up Appointments     Follow Up and recommended labs and tests      Follow up with retirement physician.  The following labs/tests are   recommended: CBC and BMP in 7 days.            Discharge Disposition   Discharged to rehabilitation facility  Condition at discharge: Stable    Hospital Course   Daya Ignacio is a 89-year-old female with PMH including HTN, NSTEMI, MDD, anxiety, mild cognitive impairment, anemia, chronic back pain from compression fractures, UTI, GI bleed, nephrolithiasis, C. Difficile, recent COVID-19 diagnosis presents with acute encephalopathy, dehydration, and medication non-compliance, including missing doses of Dilaudid for chronic pain and Paxlovid for COVID-19. She has a history of chronic pain from compression fractures, recent C. difficile colitis, and chronic colonization with ESBL and VRE.  She was also found to have urinary tract infection.  She was continued on Paxlovid during hospital stay.  Started on IV ceftriaxone.  Urine culture growing E. coli.  Ceftriaxone switched to cephalexin 500 mg twice a day for an additional 4 days.  No continued respiratory symptoms by time of discharge.  Discharging back to acute to transitional care unit.  Should follow-up with " her primary care provider within 1 week.  Recheck CBC and BMP within 1 week.  Blood pressure medications initially on hold due to some mild low blood pressures.  Blood pressures have elevated again by time of discharge.  Being restarted on most of her home medications.  Amlodipine still on hold at time of discharge.  Lisinopril has been restarted but at a mildly reduced dose from previous.  Should be reevaluated by primary care provider at office visit follow-up.  Also needs to have repeat imaging with her primary care provider of the incidental finding of cystic adnexal lesion noted on CT scan of the abdomen and pelvis.    Consultations This Hospital Stay   PHYSICAL THERAPY ADULT IP CONSULT  CARE MANAGEMENT / SOCIAL WORK IP CONSULT  PHYSICAL THERAPY ADULT IP CONSULT  OCCUPATIONAL THERAPY ADULT IP CONSULT    Code Status   No CPR- Do NOT Intubate    Time Spent on this Encounter   I spent 40 minutes with Ms. Ignacio and working on discharge on 6/24/2024.       Bk Munoz [facility] OBSERVATION DEPT  [address] [place] [address]  Phone: [phone]  ______________________________________________________________________    Physical Exam   Vital Signs: Temp: 97.6  F (36.4  C) Temp src: Oral BP: (!) 147/62 Pulse: 69   Resp: 16 SpO2: 94 % O2 Device: None (Room air)    Weight: 103 lbs 0 oz  Gen:  NAD, A&Ox2 to person and place.  Trouble with time.  Eyes:  PERRL, sclera anicteric.  OP:  MMM, no lesions.  Neck:  Supple.  CV:  Regular, no loud murmurs.  Lung:  CTA b/l, normal effort.  Ab:  +BS, soft.  Skin:  Warm, dry to touch.  No rash.  Ext:  No pitting edema LE b/l.         Primary Care Physician   DINORAH DISLA    Discharge Orders      General info for SNF    Length of Stay Estimate: Short Term Care: Estimated # of Days <30  Condition at Discharge: Improving  Level of care:skilled   Rehabilitation Potential: Good  Admission H&P remains valid and up-to-date: Yes  Recent  Chemotherapy: N/A  Use Nursing Home Standing Orders: Yes     Mantoux instructions    Give two-step Mantoux (PPD) Per Facility Policy Yes     Follow Up and recommended labs and tests    Follow up with residential physician.  The following labs/tests are recommended: CBC and BMP in 7 days.     Reason for your hospital stay    COVID-19 infection.     Activity - Up ad janel     No CPR- Do NOT Intubate     Physical Therapy Adult Consult    Evaluate and treat as clinically indicated.    Reason:  weakness     Occupational Therapy Adult Consult    Evaluate and treat as clinically indicated.    Reason:  weakness     Diet    Follow this diet upon discharge: Regular         Discharge Medications   Current Discharge Medication List        START taking these medications    Details   cephALEXin (KEFLEX) 500 MG capsule Take 1 capsule (500 mg) by mouth 2 times daily for 4 days    Associated Diagnoses: COVID-19           CONTINUE these medications which have CHANGED    Details   HYDROmorphone (DILAUDID) 2 MG tablet Take 0.5 tablets (1 mg) by mouth every 6 hours as needed for moderate pain  Qty: 10 tablet, Refills: 0    Associated Diagnoses: COVID-19      lisinopril (ZESTRIL) 10 MG tablet Take 1 tablet (10 mg) by mouth daily    Associated Diagnoses: COVID-19           CONTINUE these medications which have NOT CHANGED    Details   acetaminophen (TYLENOL) 500 MG tablet Take 1,000 mg by mouth 3 times daily      albuterol (PROVENTIL) (2.5 MG/3ML) 0.083% neb solution Take 1 vial (2.5 mg) by nebulization every 4 hours as needed for shortness of breath, wheezing or cough    Associated Diagnoses: Acute respiratory failure with hypoxia (H)      carvedilol (COREG) 12.5 MG tablet Take 12.5 mg by mouth 2 times daily (with meals)      Cholecalciferol (VITAMIN D3) 2000 UNITS CAPS Take 2,000 Units by mouth daily       cloNIDine (CATAPRES) 0.1 MG tablet Take 0.1 mg by mouth at bedtime      cyanocobalamin (VITAMIN B-12) 1000 MCG tablet Take 1,000 mcg  by mouth daily      Ferrous Gluconate 324 (37.5 Fe) MG TABS Take 1 tablet by mouth daily      isosorbide dinitrate (ISORDIL) 10 MG tablet Take 1 tablet (10 mg) by mouth 3 times daily  Qty: 270 tablet, Refills: 3    Associated Diagnoses: Non-STEMI (non-ST elevated myocardial infarction) (H)      melatonin 3 MG tablet Take 3 mg by mouth nightly as needed for sleep      methocarbamol (ROBAXIN) 500 MG tablet Take 0.5 tablets (250 mg) by mouth 3 times daily as needed      Multiple Vitamins-Minerals (PRESERVISION AREDS) CAPS Take 1 capsule by mouth 2 times daily       nystatin (MYCOSTATIN) 618453 UNIT/GM external cream Apply topically 2 times daily as needed (rash)      ondansetron (ZOFRAN) 4 MG tablet Take 4 mg by mouth every 8 hours as needed for nausea      polyethylene glycol (MIRALAX/GLYCOLAX) packet Take 17 g by mouth daily as needed for constipation    Associated Diagnoses: Tibia/fibula fracture, right, closed, initial encounter      SENNA-docusate sodium (SENNA S) 8.6-50 MG tablet Take 1 tablet by mouth at bedtime    Associated Diagnoses: Drug-induced constipation      sertraline (ZOLOFT) 100 MG tablet Take 100 mg by mouth daily      vancomycin (VANCOCIN) 125 MG capsule Take 1 capsule (125 mg) by mouth 4 times daily for 10 days, THEN 1 capsule (125 mg) 2 times daily for 30 days.    Associated Diagnoses: Clostridium difficile colitis           STOP taking these medications       amLODIPine (NORVASC) 2.5 MG tablet Comments:   Reason for Stopping:         nirmatrelvir and ritonavir (PAXLOVID) 150 mg/100 mg therapy pack Comments:   Reason for Stopping:             Allergies   Allergies   Allergen Reactions    Atorvastatin Muscle Pain (Myalgia)    Nitrofurantoin Muscle Pain (Myalgia) and Unknown     Body aches      Penicillins Unknown    Sulfa Antibiotics Other (See Comments)     Tolerated Bactrim May 2019  Entire family has allergy to Sulfa    Sulfasalazine Unknown

## 2024-06-24 NOTE — PLAN OF CARE
"  Problem: Adult Inpatient Plan of Care  Goal: Plan of Care Review  Description: The Plan of Care Review/Shift note should be completed every shift.  The Outcome Evaluation is a brief statement about your assessment that the patient is improving, declining, or no change.  This information will be displayed automatically on your shift  note.  Outcome: Progressing  Flowsheets (Taken 6/24/2024 0550)  Outcome Evaluation: PRN Tylenol and Dilaudid given for back pain. IV ceftriaxone given. Purewick in place. Plan discharge back to [facility].  Plan of Care Reviewed With: patient  Overall Patient Progress: improving  Goal: Patient-Specific Goal (Individualized)  Description: You can add care plan individualizations to a care plan. Examples of Individualization might be:  \"Parent requests to be called daily at 9am for status\", \"I have a hard time hearing out of my right ear\", or \"Do not touch me to wake me up as it startles  me\".  Outcome: Progressing  Goal: Absence of Hospital-Acquired Illness or Injury  Outcome: Progressing  Intervention: Identify and Manage Fall Risk  Recent Flowsheet Documentation  Taken 6/23/2024 2100 by Gila Seth RN  Safety Promotion/Fall Prevention:   activity supervised   safety round/check completed   nonskid shoes/slippers when out of bed   patient and family education  Intervention: Prevent Skin Injury  Recent Flowsheet Documentation  Taken 6/23/2024 2100 by Gila Seth RN  Body Position: turned  Device Skin Pressure Protection: absorbent pad utilized/changed  Intervention: Prevent and Manage VTE (Venous Thromboembolism) Risk  Recent Flowsheet Documentation  Taken 6/23/2024 2100 by Gila Seth RN  VTE Prevention/Management: SCDs (sequential compression devices) off  Intervention: Prevent Infection  Recent Flowsheet Documentation  Taken 6/23/2024 2100 by Gila Seth RN  Infection Prevention:   hand hygiene promoted   personal protective equipment utilized   rest/sleep promoted   single " patient room provided  Goal: Optimal Comfort and Wellbeing  Outcome: Progressing  Goal: Readiness for Transition of Care  Outcome: Progressing

## 2024-06-25 ENCOUNTER — TRANSITIONAL CARE UNIT VISIT (OUTPATIENT)
Dept: GERIATRICS | Facility: CLINIC | Age: 89
End: 2024-06-25
Payer: COMMERCIAL

## 2024-06-25 ENCOUNTER — PATIENT OUTREACH (OUTPATIENT)
Dept: CARE COORDINATION | Facility: CLINIC | Age: 89
End: 2024-06-25

## 2024-06-25 VITALS
DIASTOLIC BLOOD PRESSURE: 76 MMHG | HEIGHT: 64 IN | HEART RATE: 62 BPM | RESPIRATION RATE: 16 BRPM | SYSTOLIC BLOOD PRESSURE: 141 MMHG | WEIGHT: 103.6 LBS | OXYGEN SATURATION: 96 % | BODY MASS INDEX: 17.69 KG/M2 | TEMPERATURE: 97.3 F

## 2024-06-25 DIAGNOSIS — I10 BENIGN ESSENTIAL HYPERTENSION: ICD-10-CM

## 2024-06-25 DIAGNOSIS — Z87.81 H/O COMPRESSION FRACTURE OF SPINE: ICD-10-CM

## 2024-06-25 DIAGNOSIS — S82.302D CLOSED FRACTURE OF DISTAL END OF LEFT FIBULA AND TIBIA WITH ROUTINE HEALING, SUBSEQUENT ENCOUNTER: ICD-10-CM

## 2024-06-25 DIAGNOSIS — F03.B3 MODERATE DEMENTIA WITH MOOD DISTURBANCE, UNSPECIFIED DEMENTIA TYPE (H): ICD-10-CM

## 2024-06-25 DIAGNOSIS — N39.0 E-COLI UTI: Primary | ICD-10-CM

## 2024-06-25 DIAGNOSIS — N18.31 STAGE 3A CHRONIC KIDNEY DISEASE (H): ICD-10-CM

## 2024-06-25 DIAGNOSIS — S82.832D CLOSED FRACTURE OF DISTAL END OF LEFT FIBULA AND TIBIA WITH ROUTINE HEALING, SUBSEQUENT ENCOUNTER: ICD-10-CM

## 2024-06-25 DIAGNOSIS — G93.49 INFECTIOUS ENCEPHALOPATHY: ICD-10-CM

## 2024-06-25 DIAGNOSIS — D62 ABLA (ACUTE BLOOD LOSS ANEMIA): ICD-10-CM

## 2024-06-25 DIAGNOSIS — E46 MALNUTRITION, UNSPECIFIED TYPE (H): ICD-10-CM

## 2024-06-25 DIAGNOSIS — B99.9 INFECTIOUS ENCEPHALOPATHY: ICD-10-CM

## 2024-06-25 DIAGNOSIS — B96.20 E-COLI UTI: Primary | ICD-10-CM

## 2024-06-25 DIAGNOSIS — M54.50 CHRONIC LOW BACK PAIN WITHOUT SCIATICA, UNSPECIFIED BACK PAIN LATERALITY: ICD-10-CM

## 2024-06-25 DIAGNOSIS — G89.29 CHRONIC LOW BACK PAIN WITHOUT SCIATICA, UNSPECIFIED BACK PAIN LATERALITY: ICD-10-CM

## 2024-06-25 DIAGNOSIS — U07.1 INFECTION DUE TO 2019 NOVEL CORONAVIRUS: ICD-10-CM

## 2024-06-25 DIAGNOSIS — A04.72 CLOSTRIDIUM DIFFICILE COLITIS: ICD-10-CM

## 2024-06-25 PROCEDURE — 99309 SBSQ NF CARE MODERATE MDM 30: CPT | Performed by: PHYSICIAN ASSISTANT

## 2024-06-25 NOTE — PLAN OF CARE
Remaining Paxlovid Dosing given to Transport techs. They left sitting on counter, place back in med room cupboard and charge updated of med location in the event they return for the medication.    Goal Outcome Evaluation:      Plan of Care Reviewed With: patient    Overall Patient Progress: improvingOverall Patient Progress: improving    [facility]

## 2024-06-25 NOTE — LETTER
6/25/2024      Daya Ignacio  [address]  [facility] [address]        [facility]    PRIMARY CARE PROVIDER AND CLINIC:  [facility] [address] [address] / [place]  Chief Complaint   Patient presents with     Hospital F/U      [place] Medical Record Number:  [id number]  Place of Service where encounter took place:  [facility] ([facility]) [[id number]]    Daya Ignacio  is a 89 year old  (10/11/1930), admitted to the above facility from  [facility]. Hospital stay 6/21/24 through 6/24/24..   HPI:    Notes from hospitalization reviewed including H&P  and D/c summary    Daya Ignacio is a 89-year-old female with a past medical history of hypertension, chronic back pain, mild cognitive impairment, GERD, previous C. difficile, history of right tib-fib fracture with recent recurrent hospitalizations at [facility].    Initially hospitalized 5/1 - 5/8 after presenting from her [facility] with a fall. Found to have a distal tib-fib fracture.  Orthopedics consulted underwent surgical ORIF.  Postoperative course complicated by hypoxia with cough.  Chest x-ray negative.  Treated supportively.  Assessed by therapies and discharged to TCU    Readmitted 5/25 - 5/31 after presenting from [facility] with abdominal pain and diarrhea.  Found to have significant leukocytosis at 28.  CT scan concerning for colitis.  C. difficile PCR and toxin positive.  GI consulted and recommended p.o. vancomycin and taper but may consider FMT given severity.  Hospital course complicated by acute chronic anemia for which she required a transfusion.  Now discharged back to TCU    Readmitted 6/21 - 6/24/2024 for increasing confusion and refusal to take p.o. meds.  This was in the setting of recently diagnosed COVID-19 for which she had been initiated on Paxlovid at [facility].  Additionally, found to have E. coli urinary tract infection for which she was treated with  antibiotics.  Blood pressure medications were adjusted.  Discharged back to TCU      Daya is evaluated in her room.  Complains of being cold.  Denies significant back pain.  No abdominal pain.  No urinary symptoms.  Breakfast tray is largely untouched and feels she is not hungry.  Denies upper respiratory symptoms.    Spoke with RN.  No issues with taking meds.        Review of nursing home EMR: -162      CODE STATUS/ADVANCE DIRECTIVES DISCUSSION:  No CPR- Do NOT Intubate  DNR / DNI  ALLERGIES:   Allergies   Allergen Reactions     Atorvastatin Muscle Pain (Myalgia)     Nitrofurantoin Muscle Pain (Myalgia) and Unknown     Body aches       Penicillins Unknown     Sulfa Antibiotics Other (See Comments)     Tolerated Bactrim May 2019  Entire family has allergy to Sulfa     Sulfasalazine Unknown      PAST MEDICAL HISTORY:   Past Medical History:   Diagnosis Date     Anemia      Anxiety      Arthritis      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 (H28) 04/16/2014     Chronic low back pain      Depression      h/o Clostridium difficile colitis 06/2019     h/o Mumps      Hypertension      Kidney stone      Lumbar compression fracture      Macular degeneration (senile) of retina, unspecified      Mild cognitive impairment      NSTEMI (non-ST elevated myocardial infarction) (H) 05/28/2017     Recurrent UTI      Seasonal allergic rhinitis      Upper GI bleed 06/2019     Urinary tract infection due to extended-spectrum beta lactamase (ESBL)-producing Klebsiella 05/2019    resistant to Bactrim      PAST SURGICAL HISTORY:   has a past surgical history that includes Esophagoscopy, gastroscopy, duodenoscopy (EGD), combined (N/A, 06/11/2019); Cystoscopy (N/A, 08/01/2019); Tonsillectomy, adenoidectomy, combined; Cataract removal NOS (Bilateral); Open reduction internal fixation tibial plateau (Right, 08/28/2019); Combined Cystoscopy, Insert Stent Ureter(s) (Left, 05/06/2020); Extracorporeal shock wave lithotripsy (ESWL)  (Left, 05/28/2020); Laser holmium lithotripsy ureter(s), insert stent, combined (Left, 10/30/2020); Open reduction internal fixation ankle (Left, 05/03/2024); Open reduction internal fixation rodding intramedullary tibia (Left, 05/03/2024); Open reduction internal fixation hip (Left); and Arthroplasty hip (Right).  FAMILY HISTORY: family history includes Alzheimer Disease in her mother; Cardiovascular in her father; Neurologic Disorder (age of onset: 83) in her brother.  SOCIAL HISTORY:   reports that she has quit smoking. Her smoking use included cigarettes. She has never used smokeless tobacco. She reports that she does not drink alcohol and does not use drugs.  Patient's living condition: lives alone    Post Discharge Medication Reconciliation Status:   MED REC REQUIRED  Post Medication Reconciliation Status: discharge medications reconciled and changed, per note/orders       Current Outpatient Medications   Medication Sig Dispense Refill     acetaminophen (TYLENOL) 500 MG tablet Take 1,000 mg by mouth 3 times daily       albuterol (PROVENTIL) (2.5 MG/3ML) 0.083% neb solution Take 1 vial (2.5 mg) by nebulization every 4 hours as needed for shortness of breath, wheezing or cough       carvedilol (COREG) 12.5 MG tablet Take 12.5 mg by mouth 2 times daily (with meals)       cephALEXin (KEFLEX) 500 MG capsule Take 1 capsule (500 mg) by mouth 2 times daily for 4 days       Cholecalciferol (VITAMIN D3) 2000 UNITS CAPS Take 2,000 Units by mouth daily        cloNIDine (CATAPRES) 0.1 MG tablet Take 0.1 mg by mouth at bedtime       cyanocobalamin (VITAMIN B-12) 1000 MCG tablet Take 1,000 mcg by mouth daily       Ferrous Gluconate 324 (37.5 Fe) MG TABS Take 1 tablet by mouth daily       HYDROmorphone (DILAUDID) 2 MG tablet Take 0.5 tablets (1 mg) by mouth every 6 hours as needed for moderate pain 10 tablet 0     isosorbide dinitrate (ISORDIL) 10 MG tablet Take 1 tablet (10 mg) by mouth 3 times daily 270 tablet 3      "lisinopril (ZESTRIL) 10 MG tablet Take 1 tablet (10 mg) by mouth daily       melatonin 3 MG tablet Take 3 mg by mouth nightly as needed for sleep       methocarbamol (ROBAXIN) 500 MG tablet Take 0.5 tablets (250 mg) by mouth 3 times daily as needed       Multiple Vitamins-Minerals (PRESERVISION AREDS) CAPS Take 1 capsule by mouth 2 times daily        nystatin (MYCOSTATIN) 026823 UNIT/GM external cream Apply topically 2 times daily as needed (rash)       ondansetron (ZOFRAN) 4 MG tablet Take 4 mg by mouth every 8 hours as needed for nausea       polyethylene glycol (MIRALAX/GLYCOLAX) packet Take 17 g by mouth daily as needed for constipation       SENNA-docusate sodium (SENNA S) 8.6-50 MG tablet Take 1 tablet by mouth at bedtime       sertraline (ZOLOFT) 100 MG tablet Take 100 mg by mouth daily       vancomycin (VANCOCIN) 125 MG capsule Take 1 capsule (125 mg) by mouth 4 times daily for 10 days, THEN 1 capsule (125 mg) 2 times daily for 30 days.       No current facility-administered medications for this visit.       ROS:  10 point ROS of systems including Constitutional, Eyes, Respiratory, Cardiovascular, Gastroenterology, Genitourinary, Integumentary, Musculoskeletal, Psychiatric were all negative except for pertinent positives noted in my HPI.    Vitals:  BP (!) 141/76   Pulse 62   Temp 97.3  F (36.3  C)   Resp 16   Ht 1.626 m (5' 4\")   Wt 47 kg (103 lb 9.6 oz)   SpO2 96%   BMI 17.78 kg/m    Exam:  Physical Exam  Vitals (Facility EMR) reviewed.   Constitutional:       General: She is not in acute distress.     Comments: Frail appearing   HENT:      Head: Normocephalic and atraumatic.   Eyes:      General: No scleral icterus.  Cardiovascular:      Rate and Rhythm: Normal rate and regular rhythm.      Heart sounds: No murmur heard.  Pulmonary:      Effort: Pulmonary effort is normal.      Breath sounds: No wheezing.   Abdominal:      General: Bowel sounds are normal. There is no distension.      Tenderness: " There is no abdominal tenderness.   Musculoskeletal:      Right lower leg: No edema.      Left lower leg: No edema.   Skin:     General: Skin is warm and dry.      Findings: No rash.   Neurological:      Mental Status: She is alert. Mental status is at baseline.   Psychiatric:         Behavior: Behavior normal.         Lab/Diagnostic data:  Recent labs in [facility] reviewed by me today.  and Most Recent 3 CBC's:  Recent Labs   Lab Test 06/24/24  0655 06/21/24 2256 06/13/24  0559   WBC 4.1 7.3 4.9   HGB 8.0* 9.3* 7.7*   MCV 99 98 101*    293 217     Most Recent 3 BMP's:  Recent Labs   Lab Test 06/24/24  0655 06/21/24 2256 06/13/24  0559   * 132* 133*   POTASSIUM 3.8 4.2 4.6   CHLORIDE 100 97* 101   CO2 23 22 25   BUN 10.3 21.2 20.9   CR 0.41* 0.52 0.51   ANIONGAP 11 13 7   DAVE 9.2 10.1* 10.0*   GLC 79 100* 88     Most Recent 2 LFT's:  Recent Labs   Lab Test 06/21/24 2256 05/28/24  0635   AST 8 10   ALT 7 5   ALKPHOS 93 179*   BILITOTAL 1.2 0.6     7-Day Micro Results       Collected Updated Procedure Result Status      06/22/2024 0146 06/23/2024 2051 Urine Culture [[id number]]    (Abnormal)   Urine, Catheter    Final result Component Value   Culture >100,000 CFU/mL Escherichia coli        Susceptibility        Escherichia coli      ALFRED      Ampicillin 4 ug/mL Susceptible      Ampicillin/ Sulbactam <=2 ug/mL Susceptible      Cefazolin <=4 ug/mL Susceptible  [1]       Cefepime <=1 ug/mL Susceptible      Cefoxitin <=4 ug/mL Susceptible      Ceftazidime <=1 ug/mL Susceptible      Ceftriaxone <=1 ug/mL Susceptible      Ciprofloxacin >=4 ug/mL Resistant      Gentamicin <=1 ug/mL Susceptible      Levofloxacin >=8 ug/mL Resistant      Nitrofurantoin <=16 ug/mL Susceptible      Piperacillin/Tazobactam <=4 ug/mL Susceptible      Tobramycin <=1 ug/mL Susceptible      Trimethoprim/Sulfamethoxazole <=1/19 ug/mL Susceptible                   [1]  Cefazolin ALFRED breakpoints are for the treatment of uncomplicated urinary  tract infections. For the treatment of systemic infections, please contact the laboratory for additional testing.                          Most Recent TSH and T4:  Recent Labs   Lab Test 05/29/24  0736   TSH 1.89     Most Recent Hemoglobin A1c:  Recent Labs   Lab Test 01/13/17  1020   A1C 5.3     Most Recent Urinalysis:  Recent Labs   Lab Test 06/22/24  0146 05/06/20  1035 04/07/20  1400   COLOR Yellow   < > Yellow   APPEARANCE Cloudy*   < > Cloudy*   URINEGLC Negative   < > Negative   URINEBILI Negative   < > Negative   URINEKETONE Trace*   < > Negative   SG 1.022   < > 1.013   UBLD Negative   < > Moderate*   URINEPH 5.5   < > 7.5   PROTEIN 30*   < > 30 mg/dL*   UROBILINOGEN  --   --  <2.0 E.U./dL   NITRITE Negative   < > Negative   LEUKEST Large*   < > Large*   RBCU 23*   < > >100*   WBCU 119*   < > >100*    < > = values in this interval not displayed.     Most Recent Anemia Panel:  Recent Labs   Lab Test 06/24/24  0655 06/13/24  0559 06/06/24  0548 05/31/24  0800 05/29/24  0736 05/28/24  1303 05/28/24  0635   WBC 4.1   < > 6.2   < > 6.9  6.9  --  6.5   HGB 8.0*   < > 8.1*   < > 8.9*  9.0*  --  7.0*   HCT 24.1*   < > 25.0*   < > 26.7*  27.1*  --  21.8*   MCV 99   < > 102*   < > 100  100  --  103*      < > 232   < > 199  208  --  188   IRON  --   --  51  --   --   --   --    IRONSAT  --   --  29  --   --   --   --    RETICABSCT  --   --   --   --  0.019*  0.021*  --   --    RETP  --   --   --   --  0.7  0.8  --   --    FEB  --   --  173*  --   --   --   --    CHINYERE  --   --  886*  --   --   --   --    B12  --   --   --   --   --   --  >4,000*   FOLIC  --   --   --   --   --  13.9  --     < > = values in this interval not displayed.       ASSESSMENT/PLAN:  E. coli UTI: Resistant to fluoroquinolones.  Treated with ceftriaxone during hospital stay and transition to cephalexin  - Continue cephalexin through 6/28  - Remains on suppressive p.o. vancomycin due recent h/o Cdiff    COVID-19:  - Completed course  of Paxlovid  - Continues on contact precautions per facility protocol through 7/1  - Continue close monitoring of vital signs and supportive care    Acute encephalopathy, improved  Dementia: Baseline cognitive impairment/dementia. SLUMS 5/2024 at TCU 8/30.  Worsening confusion in the setting of COVID and UTI.  - Reorient as needed  - OT and social work following      C. difficile colitis: Presented for evaluation of abdominal pain and diarrhea.  CT scan with evidence of colitis and esophagitis.  C. difficile PCR and toxin positive.  GI consulted and recommended p.o. vancomycin with consideration for outpatient FMT  -Recently had [facility] follow up.  Planning to continue suppressive vancomycin. [facility] working on getting FMT covered by insurance  - Continue regular diet    Acute on chronic anemia  History of GI bleed  Esophagitis:   - Continue Protonix twice daily  - Repeat hemoglobin 7/1      Fall  Acute comminuted distal left tibia/fibular fracture status post ORIF 5/3/2024:   - Continues NWB at least until next Ortho Follow up 7/3  -Pain control as discussed below  - PT/OT/social work.  - Continue vitamin D supplementation:    Acute on Chronic pain Back Pain  H/o Vertebral compression Fx T11-L3: Chronic back pain in the setting of previous compression fractures.  - Continue scheduled Tylenol  - Continue as needed methocarbamol and Dilaudid.  P.o. Dilaudid dose reduced to 1 mg every 6 hours as needed during hospital stay.      Hypertension: BP is soft during hospital stay.  Amlodipine discontinued and lisinopril reduced to 20-10 mg  - SBPs 140s-150s but overall appropriate for age  - Continue off amlodipine  - Continue reduce lisinopril  - Continue baseline Coreg, Isordil and clonidine      CKD stage IIIa: Estimated Creatinine Clearance: 63.6 mL/min (A) (based on SCr of 0.41 mg/dL (L)).  -Recent BMP within baseline  - BMP 7/1    MDD  Anxiety  -Continue Zoloft    Malnutrition  Failure to Thrive: Has had significant weight  loss since admission to TCU.  Not unexpected given dementia as well as hospitalization recurrent hospitalizations for C. difficile colitis, COVID and UTI  -Minimal p.o. intake  - Facility nutritionist following    Wt Readings from Last 4 Encounters:   06/25/24 47 kg (103 lb 9.6 oz)   06/21/24 46.7 kg (103 lb)   06/21/24 47 kg (103 lb 9.6 oz)   06/19/24 47 kg (103 lb 9.6 oz)            Electronically signed by:  Olivia Melara PA-C                    Sincerely,        Olivia Melara PA-C

## 2024-06-25 NOTE — PROGRESS NOTES
Connected Care Resource Center: Connected [facility] [place]    Background: Transitional Care Management program identified per system criteria and reviewed by Connected Care Resource Center team for possible outreach.    Assessment: Upon chart review, CCRC Team member will not proceed with patient outreach related to this episode of Transitional Care Management program due to reason below:    Non-[facility] TCU: CCRC team member noted patient discharged to TCU/ARU/LTACH. Patient is not established with a [facility] currently supported by Primary Care-Care Coordination therefore handoff to Primary Care-Care Coordination is not appropriate at this time.    Plan: Transitional Care Management episode addressed appropriately per reason noted above.      Citlaly Burgos MA  [facility] [place], [facility]    *Connected Care Resource Team does NOT follow patient ongoing. Referrals are identified based on internal discharge reports and the outreach is to ensure patient has an understanding of their discharge instructions.

## 2024-06-25 NOTE — PROGRESS NOTES
[facility] GERIATRICS    PRIMARY CARE PROVIDER AND CLINIC:  ALICIA FLETCHER PHYSICIAN [facility] [address] [address] / [place]  Chief Complaint   Patient presents with    Hospital F/U      [place] Medical Record Number:  [id number]  Place of Service where encounter took place:  [facility] ([facility]) [[id number]]    Daya Ignacio  is a 89 year old  (10/11/1930), admitted to the above facility from  [facility]. Hospital stay 6/21/24 through 6/24/24..   HPI:    Notes from hospitalization reviewed including H&P  and D/c summary    Daya Ignacio is a 89-year-old female with a past medical history of hypertension, chronic back pain, mild cognitive impairment, GERD, previous C. difficile, history of right tib-fib fracture with recent recurrent hospitalizations at [facility].    Initially hospitalized 5/1 - 5/8 after presenting from her [facility] with a fall. Found to have a distal tib-fib fracture.  Orthopedics consulted underwent surgical ORIF.  Postoperative course complicated by hypoxia with cough.  Chest x-ray negative.  Treated supportively.  Assessed by therapies and discharged to [facility]    Readmitted 5/25 - 5/31 after presenting from U with abdominal pain and diarrhea.  Found to have significant leukocytosis at 28.  CT scan concerning for colitis.  C. difficile PCR and toxin positive.  GI consulted and recommended p.o. vancomycin and taper but may consider FMT given severity.  Hospital course complicated by acute chronic anemia for which she required a transfusion.  Now discharged back to [facility]    Readmitted 6/21 - 6/24/2024 for increasing confusion and refusal to take p.o. meds.  This was in the setting of recently diagnosed COVID-19 for which she had been initiated on Paxlovid at [facility].  Additionally, found to have E. coli urinary tract infection for which she was treated with antibiotics.  Blood pressure medications were adjusted.  Discharged back to [facility]      Daya gauthier  evaluated in her room.  Complains of being cold.  Denies significant back pain.  No abdominal pain.  No urinary symptoms.  Breakfast tray is largely untouched and feels she is not hungry.  Denies upper respiratory symptoms.    Spoke with RN.  No issues with taking meds.        Review of nursing home EMR: -162      CODE STATUS/ADVANCE DIRECTIVES DISCUSSION:  No CPR- Do NOT Intubate  DNR / DNI  ALLERGIES:   Allergies   Allergen Reactions    Atorvastatin Muscle Pain (Myalgia)    Nitrofurantoin Muscle Pain (Myalgia) and Unknown     Body aches      Penicillins Unknown    Sulfa Antibiotics Other (See Comments)     Tolerated Bactrim May 2019  Entire family has allergy to Sulfa    Sulfasalazine Unknown      PAST MEDICAL HISTORY:   Past Medical History:   Diagnosis Date    Anemia     Anxiety     Arthritis     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 (H28) 04/16/2014    Chronic low back pain     Depression     h/o Clostridium difficile colitis 06/2019    h/o Mumps     Hypertension     Kidney stone     Lumbar compression fracture     Macular degeneration (senile) of retina, unspecified     Mild cognitive impairment     NSTEMI (non-ST elevated myocardial infarction) (H) 05/28/2017    Recurrent UTI     Seasonal allergic rhinitis     Upper GI bleed 06/2019    Urinary tract infection due to extended-spectrum beta lactamase (ESBL)-producing Klebsiella 05/2019    resistant to Bactrim      PAST SURGICAL HISTORY:   has a past surgical history that includes Esophagoscopy, gastroscopy, duodenoscopy (EGD), combined (N/A, 06/11/2019); Cystoscopy (N/A, 08/01/2019); Tonsillectomy, adenoidectomy, combined; Cataract removal NOS (Bilateral); Open reduction internal fixation tibial plateau (Right, 08/28/2019); Combined Cystoscopy, Insert Stent Ureter(s) (Left, 05/06/2020); Extracorporeal shock wave lithotripsy (ESWL) (Left, 05/28/2020); Laser holmium lithotripsy ureter(s), insert stent, combined (Left, 10/30/2020); Open reduction  internal fixation ankle (Left, 05/03/2024); Open reduction internal fixation rodding intramedullary tibia (Left, 05/03/2024); Open reduction internal fixation hip (Left); and Arthroplasty hip (Right).  FAMILY HISTORY: family history includes Alzheimer Disease in her mother; Cardiovascular in her father; Neurologic Disorder (age of onset: 83) in her brother.  SOCIAL HISTORY:   reports that she has quit smoking. Her smoking use included cigarettes. She has never used smokeless tobacco. She reports that she does not drink alcohol and does not use drugs.  Patient's living condition: lives alone    Post Discharge Medication Reconciliation Status:   MED REC REQUIRED  Post Medication Reconciliation Status: discharge medications reconciled and changed, per note/orders       Current Outpatient Medications   Medication Sig Dispense Refill    acetaminophen (TYLENOL) 500 MG tablet Take 1,000 mg by mouth 3 times daily      albuterol (PROVENTIL) (2.5 MG/3ML) 0.083% neb solution Take 1 vial (2.5 mg) by nebulization every 4 hours as needed for shortness of breath, wheezing or cough      carvedilol (COREG) 12.5 MG tablet Take 12.5 mg by mouth 2 times daily (with meals)      cephALEXin (KEFLEX) 500 MG capsule Take 1 capsule (500 mg) by mouth 2 times daily for 4 days      Cholecalciferol (VITAMIN D3) 2000 UNITS CAPS Take 2,000 Units by mouth daily       cloNIDine (CATAPRES) 0.1 MG tablet Take 0.1 mg by mouth at bedtime      cyanocobalamin (VITAMIN B-12) 1000 MCG tablet Take 1,000 mcg by mouth daily      Ferrous Gluconate 324 (37.5 Fe) MG TABS Take 1 tablet by mouth daily      HYDROmorphone (DILAUDID) 2 MG tablet Take 0.5 tablets (1 mg) by mouth every 6 hours as needed for moderate pain 10 tablet 0    isosorbide dinitrate (ISORDIL) 10 MG tablet Take 1 tablet (10 mg) by mouth 3 times daily 270 tablet 3    lisinopril (ZESTRIL) 10 MG tablet Take 1 tablet (10 mg) by mouth daily      melatonin 3 MG tablet Take 3 mg by mouth nightly as  "needed for sleep      methocarbamol (ROBAXIN) 500 MG tablet Take 0.5 tablets (250 mg) by mouth 3 times daily as needed      Multiple Vitamins-Minerals (PRESERVISION AREDS) CAPS Take 1 capsule by mouth 2 times daily       nystatin (MYCOSTATIN) 779212 UNIT/GM external cream Apply topically 2 times daily as needed (rash)      ondansetron (ZOFRAN) 4 MG tablet Take 4 mg by mouth every 8 hours as needed for nausea      polyethylene glycol (MIRALAX/GLYCOLAX) packet Take 17 g by mouth daily as needed for constipation      SENNA-docusate sodium (SENNA S) 8.6-50 MG tablet Take 1 tablet by mouth at bedtime      sertraline (ZOLOFT) 100 MG tablet Take 100 mg by mouth daily      vancomycin (VANCOCIN) 125 MG capsule Take 1 capsule (125 mg) by mouth 4 times daily for 10 days, THEN 1 capsule (125 mg) 2 times daily for 30 days.       No current facility-administered medications for this visit.       ROS:  10 point ROS of systems including Constitutional, Eyes, Respiratory, Cardiovascular, Gastroenterology, Genitourinary, Integumentary, Musculoskeletal, Psychiatric were all negative except for pertinent positives noted in my HPI.    Vitals:  BP (!) 141/76   Pulse 62   Temp 97.3  F (36.3  C)   Resp 16   Ht 1.626 m (5' 4\")   Wt 47 kg (103 lb 9.6 oz)   SpO2 96%   BMI 17.78 kg/m    Exam:  Physical Exam  Vitals (Facility EMR) reviewed.   Constitutional:       General: She is not in acute distress.     Comments: Frail appearing   HENT:      Head: Normocephalic and atraumatic.   Eyes:      General: No scleral icterus.  Cardiovascular:      Rate and Rhythm: Normal rate and regular rhythm.      Heart sounds: No murmur heard.  Pulmonary:      Effort: Pulmonary effort is normal.      Breath sounds: No wheezing.   Abdominal:      General: Bowel sounds are normal. There is no distension.      Tenderness: There is no abdominal tenderness.   Musculoskeletal:      Right lower leg: No edema.      Left lower leg: No edema.   Skin:     General: " Skin is warm and dry.      Findings: No rash.   Neurological:      Mental Status: She is alert. Mental status is at baseline.   Psychiatric:         Behavior: Behavior normal.         Lab/Diagnostic data:  Recent labs in [facility] reviewed by me today.  and Most Recent 3 CBC's:  Recent Labs   Lab Test 06/24/24  0655 06/21/24 2256 06/13/24  0559   WBC 4.1 7.3 4.9   HGB 8.0* 9.3* 7.7*   MCV 99 98 101*    293 217     Most Recent 3 BMP's:  Recent Labs   Lab Test 06/24/24  0655 06/21/24 2256 06/13/24  0559   * 132* 133*   POTASSIUM 3.8 4.2 4.6   CHLORIDE 100 97* 101   CO2 23 22 25   BUN 10.3 21.2 20.9   CR 0.41* 0.52 0.51   ANIONGAP 11 13 7   DAVE 9.2 10.1* 10.0*   GLC 79 100* 88     Most Recent 2 LFT's:  Recent Labs   Lab Test 06/21/24 2256 05/28/24  0635   AST 8 10   ALT 7 5   ALKPHOS 93 179*   BILITOTAL 1.2 0.6     7-Day Micro Results       Collected Updated Procedure Result Status      06/22/2024 0146 06/23/2024 2051 Urine Culture [[id number]]    (Abnormal)   Urine, Catheter    Final result Component Value   Culture >100,000 CFU/mL Escherichia coli        Susceptibility        Escherichia coli      ALFRED      Ampicillin 4 ug/mL Susceptible      Ampicillin/ Sulbactam <=2 ug/mL Susceptible      Cefazolin <=4 ug/mL Susceptible  [1]       Cefepime <=1 ug/mL Susceptible      Cefoxitin <=4 ug/mL Susceptible      Ceftazidime <=1 ug/mL Susceptible      Ceftriaxone <=1 ug/mL Susceptible      Ciprofloxacin >=4 ug/mL Resistant      Gentamicin <=1 ug/mL Susceptible      Levofloxacin >=8 ug/mL Resistant      Nitrofurantoin <=16 ug/mL Susceptible      Piperacillin/Tazobactam <=4 ug/mL Susceptible      Tobramycin <=1 ug/mL Susceptible      Trimethoprim/Sulfamethoxazole <=1/19 ug/mL Susceptible                   [1]  Cefazolin ALFRED breakpoints are for the treatment of uncomplicated urinary tract infections. For the treatment of systemic infections, please contact the laboratory for additional testing.                           Most Recent TSH and T4:  Recent Labs   Lab Test 05/29/24  0736   TSH 1.89     Most Recent Hemoglobin A1c:  Recent Labs   Lab Test 01/13/17  1020   A1C 5.3     Most Recent Urinalysis:  Recent Labs   Lab Test 06/22/24  0146 05/06/20  1035 04/07/20  1400   COLOR Yellow   < > Yellow   APPEARANCE Cloudy*   < > Cloudy*   URINEGLC Negative   < > Negative   URINEBILI Negative   < > Negative   URINEKETONE Trace*   < > Negative   SG 1.022   < > 1.013   UBLD Negative   < > Moderate*   URINEPH 5.5   < > 7.5   PROTEIN 30*   < > 30 mg/dL*   UROBILINOGEN  --   --  <2.0 E.U./dL   NITRITE Negative   < > Negative   LEUKEST Large*   < > Large*   RBCU 23*   < > >100*   WBCU 119*   < > >100*    < > = values in this interval not displayed.     Most Recent Anemia Panel:  Recent Labs   Lab Test 06/24/24  0655 06/13/24  0559 06/06/24  0548 05/31/24  0800 05/29/24  0736 05/28/24  1303 05/28/24  0635   WBC 4.1   < > 6.2   < > 6.9  6.9  --  6.5   HGB 8.0*   < > 8.1*   < > 8.9*  9.0*  --  7.0*   HCT 24.1*   < > 25.0*   < > 26.7*  27.1*  --  21.8*   MCV 99   < > 102*   < > 100  100  --  103*      < > 232   < > 199  208  --  188   IRON  --   --  51  --   --   --   --    IRONSAT  --   --  29  --   --   --   --    RETICABSCT  --   --   --   --  0.019*  0.021*  --   --    RETP  --   --   --   --  0.7  0.8  --   --    FEB  --   --  173*  --   --   --   --    CHINYERE  --   --  886*  --   --   --   --    B12  --   --   --   --   --   --  >4,000*   FOLIC  --   --   --   --   --  13.9  --     < > = values in this interval not displayed.       ASSESSMENT/PLAN:  E. coli UTI: Resistant to fluoroquinolones.  Treated with ceftriaxone during hospital stay and transition to cephalexin  - Continue cephalexin through 6/28  - Remains on suppressive p.o. vancomycin due recent h/o Cdiff    COVID-19:  - Completed course of Paxlovid  - Continues on contact precautions per facility protocol through 7/1  - Continue close monitoring of vital signs and  supportive care    Acute encephalopathy, improved  Dementia: Baseline cognitive impairment/dementia. SLUMS 5/2024 at TCU 8/30.  Worsening confusion in the setting of COVID and UTI.  - Reorient as needed  - OT and social work following      C. difficile colitis: Presented for evaluation of abdominal pain and diarrhea.  CT scan with evidence of colitis and esophagitis.  C. difficile PCR and toxin positive.  GI consulted and recommended p.o. vancomycin with consideration for outpatient FMT  -Recently had [facility] follow up.  Planning to continue suppressive vancomycin. [facility] working on getting FMT covered by insurance  - Continue regular diet    Acute on chronic anemia  History of GI bleed  Esophagitis:   - Continue Protonix twice daily  - Repeat hemoglobin 7/1      Fall  Acute comminuted distal left tibia/fibular fracture status post ORIF 5/3/2024:   - Continues NWB at least until next Ortho Follow up 7/3  -Pain control as discussed below  - PT/OT/social work.  - Continue vitamin D supplementation:    Acute on Chronic pain Back Pain  H/o Vertebral compression Fx T11-L3: Chronic back pain in the setting of previous compression fractures.  - Continue scheduled Tylenol  - Continue as needed methocarbamol and Dilaudid.  P.o. Dilaudid dose reduced to 1 mg every 6 hours as needed during hospital stay.      Hypertension: BP is soft during hospital stay.  Amlodipine discontinued and lisinopril reduced to 20-10 mg  - SBPs 140s-150s but overall appropriate for age  - Continue off amlodipine  - Continue reduce lisinopril  - Continue baseline Coreg, Isordil and clonidine      CKD stage IIIa: Estimated Creatinine Clearance: 63.6 mL/min (A) (based on SCr of 0.41 mg/dL (L)).  -Recent BMP within baseline  - BMP 7/1    MDD  Anxiety  -Continue Zoloft    Malnutrition  Failure to Thrive: Has had significant weight loss since admission to TCU.  Not unexpected given dementia as well as hospitalization recurrent hospitalizations for C.  difficile colitis, COVID and UTI  -Minimal p.o. intake  - Facility nutritionist following    Wt Readings from Last 4 Encounters:   06/25/24 47 kg (103 lb 9.6 oz)   06/21/24 46.7 kg (103 lb)   06/21/24 47 kg (103 lb 9.6 oz)   06/19/24 47 kg (103 lb 9.6 oz)            Electronically signed by:  Olivia Melara PA-C

## 2024-06-25 NOTE — PLAN OF CARE
Patient's After Visit Summary was reviewed with patient.  Patient verbalized understanding of After Visit Summary, recommended follow up and was given an opportunity to ask questions.   Discharge medications sent home with patient/family: YES - sent remaining doses of Paxlovid  Discharged with transport tech  OBSERVATION patient END time: 1908        Goal Outcome Evaluation:      Plan of Care Reviewed With: patient    Overall Patient Progress: improvingOverall Patient Progress: improving    Outcome Evaluation: transferring back to [facility]      Problem: Adult Inpatient Plan of Care  Goal: Plan of Care Review  Description: The Plan of Care Review/Shift note should be completed every shift.  The Outcome Evaluation is a brief statement about your assessment that the patient is improving, declining, or no change.  This information will be displayed automatically on your shift  note.  6/24/2024 1908 by Carissa Juan, RN  Outcome: Adequate for Care Transition  Flowsheets (Taken 6/24/2024 1908)  Outcome Evaluation: transferring back to [facility]  Plan of Care Reviewed With: patient  Overall Patient Progress: improving  6/24/2024 1821 by Carissa Juan, RN  Outcome: Progressing  Flowsheets (Taken 6/24/2024 1821)  Outcome Evaluation:   Awake and alert, intermittent confusion   can be redirected with therapeutic touch and verbal reassurance  Plan of Care Reviewed With: patient  Overall Patient Progress: improving  6/24/2024 1232 by Carissa Juan, RN  Outcome: Progressing  Flowsheets (Taken 6/24/2024 1200)  Outcome Evaluation: Awake and Alert  Plan of Care Reviewed With: patient  Overall Patient Progress: no change  6/24/2024 1034 by Carissa Juan, RN  Outcome: Progressing  Flowsheets (Taken 6/24/2024 0800)  Outcome Evaluation:   Awake and alert this AM, Patient is calm and cooperative. napping intermittently   on RA, PIV S/L  Plan of Care Reviewed With: patient  Overall Patient Progress:  "improving  Goal: Patient-Specific Goal (Individualized)  Description: You can add care plan individualizations to a care plan. Examples of Individualization might be:  \"Parent requests to be called daily at 9am for status\", \"I have a hard time hearing out of my right ear\", or \"Do not touch me to wake me up as it startles  me\".  6/24/2024 1908 by Carissa Juan, ROBERTA  Outcome: Adequate for Care Transition  6/24/2024 1821 by Carissa Juan RN  Outcome: Progressing  6/24/2024 1232 by Carissa Juan RN  Outcome: Progressing  6/24/2024 1034 by Carissa Juan RN  Outcome: Progressing  Goal: Absence of Hospital-Acquired Illness or Injury  6/24/2024 1908 by Carissa Juan RN  Outcome: Adequate for Care Transition  6/24/2024 1821 by Carissa Juan RN  Outcome: Progressing  6/24/2024 1232 by Carissa Juan RN  Outcome: Progressing  6/24/2024 1034 by Carissa Juan RN  Outcome: Progressing  Intervention: Identify and Manage Fall Risk  Recent Flowsheet Documentation  Taken 6/24/2024 1600 by Carissa Juan RN  Safety Promotion/Fall Prevention:   activity supervised   safety round/check completed   nonskid shoes/slippers when out of bed   patient and family education  Taken 6/24/2024 1200 by Carissa Juan RN  Safety Promotion/Fall Prevention:   activity supervised   safety round/check completed   nonskid shoes/slippers when out of bed   patient and family education  Taken 6/24/2024 0800 by Carissa Juan, RN  Safety Promotion/Fall Prevention:   activity supervised   safety round/check completed   nonskid shoes/slippers when out of bed   patient and family education  Intervention: Prevent Skin Injury  Recent Flowsheet Documentation  Taken 6/24/2024 1200 by Carissa Juan, RN  Body Position:   legs elevated   weight shifting   sitting up in bed  Skin Protection: incontinence pads utilized  Device Skin Pressure Protection: absorbent pad utilized/changed  Taken " 6/24/2024 0942 by Carissa Juan RN  Body Position: weight shifting  Taken 6/24/2024 0800 by Carissa Juan RN  Device Skin Pressure Protection: absorbent pad utilized/changed  Intervention: Prevent and Manage VTE (Venous Thromboembolism) Risk  Recent Flowsheet Documentation  Taken 6/24/2024 1600 by Carissa Juan RN  VTE Prevention/Management: SCDs (sequential compression devices) off  Taken 6/24/2024 1200 by Carissa Juan RN  VTE Prevention/Management: SCDs (sequential compression devices) off  Taken 6/24/2024 0800 by Carissa Juan RN  VTE Prevention/Management: SCDs (sequential compression devices) off  Intervention: Prevent Infection  Recent Flowsheet Documentation  Taken 6/24/2024 1600 by Carissa Juan RN  Infection Prevention:   hand hygiene promoted   personal protective equipment utilized   rest/sleep promoted   single patient room provided  Taken 6/24/2024 1200 by Carissa Juan RN  Infection Prevention:   hand hygiene promoted   personal protective equipment utilized   rest/sleep promoted   single patient room provided  Taken 6/24/2024 0800 by Carissa Juan RN  Infection Prevention:   hand hygiene promoted   personal protective equipment utilized   rest/sleep promoted   single patient room provided  Goal: Optimal Comfort and Wellbeing  6/24/2024 1908 by Carissa Juan, RN  Outcome: Adequate for Care Transition  6/24/2024 1821 by Carissa Juan RN  Outcome: Progressing  6/24/2024 1232 by Carissa Juan RN  Outcome: Progressing  6/24/2024 1034 by Carissa Juan, RN  Outcome: Progressing  Intervention: Monitor Pain and Promote Comfort  Recent Flowsheet Documentation  Taken 6/24/2024 1606 by Carissa Juan RN  Pain Management Interventions:   pillow support provided   repositioned   rest   therapeutic touch   therapeutic presence  Taken 6/24/2024 1200 by Carissa Juan RN  Pain Management Interventions: (also had a bowel  movement just now)   medication (see MAR)   repositioned  Taken 6/24/2024 0942 by Carissa Juan, RN  Pain Management Interventions: (scheduled Tylenol given)   medication (see MAR)   repositioned   rest  Taken 6/24/2024 0833 by Carissa Juan, RN  Pain Management Interventions: (lidocaine patch applied)   medication (see MAR)   repositioned   rest   pillow support provided  Goal: Readiness for Transition of Care  6/24/2024 1908 by Carissa Juan, RN  Outcome: Adequate for Care Transition  6/24/2024 1821 by Carissa Juan, RN  Outcome: Progressing  6/24/2024 1232 by Carissa Juan, RN  Outcome: Progressing  6/24/2024 1034 by Carissa Juan, RN  Outcome: Progressing

## 2024-06-27 ENCOUNTER — LAB REQUISITION (OUTPATIENT)
Dept: LAB | Facility: CLINIC | Age: 89
End: 2024-06-27
Payer: COMMERCIAL

## 2024-06-27 DIAGNOSIS — U07.1 COVID-19: ICD-10-CM

## 2024-07-01 LAB
ANION GAP SERPL CALCULATED.3IONS-SCNC: 8 MMOL/L (ref 7–15)
BUN SERPL-MCNC: 14.6 MG/DL (ref 8–23)
CALCIUM SERPL-MCNC: 9.4 MG/DL (ref 8.2–9.6)
CHLORIDE SERPL-SCNC: 102 MMOL/L (ref 98–107)
CREAT SERPL-MCNC: 0.45 MG/DL (ref 0.51–0.95)
DEPRECATED HCO3 PLAS-SCNC: 26 MMOL/L (ref 22–29)
EGFRCR SERPLBLD CKD-EPI 2021: 89 ML/MIN/1.73M2
ERYTHROCYTE [DISTWIDTH] IN BLOOD BY AUTOMATED COUNT: 18.2 % (ref 10–15)
GLUCOSE SERPL-MCNC: 89 MG/DL (ref 70–99)
HCT VFR BLD AUTO: 25.6 % (ref 35–47)
HGB BLD-MCNC: 8.1 G/DL (ref 11.7–15.7)
MCH RBC QN AUTO: 33.3 PG (ref 26.5–33)
MCHC RBC AUTO-ENTMCNC: 31.6 G/DL (ref 31.5–36.5)
MCV RBC AUTO: 105 FL (ref 78–100)
PLATELET # BLD AUTO: 224 10E3/UL (ref 150–450)
POTASSIUM SERPL-SCNC: 4.1 MMOL/L (ref 3.4–5.3)
RBC # BLD AUTO: 2.43 10E6/UL (ref 3.8–5.2)
SODIUM SERPL-SCNC: 136 MMOL/L (ref 135–145)
WBC # BLD AUTO: 4.9 10E3/UL (ref 4–11)

## 2024-07-01 PROCEDURE — 85027 COMPLETE CBC AUTOMATED: CPT | Mod: ORL | Performed by: INTERNAL MEDICINE

## 2024-07-01 PROCEDURE — 80048 BASIC METABOLIC PNL TOTAL CA: CPT | Mod: ORL | Performed by: INTERNAL MEDICINE

## 2024-07-01 PROCEDURE — P9604 ONE-WAY ALLOW PRORATED TRIP: HCPCS | Mod: ORL | Performed by: INTERNAL MEDICINE

## 2024-07-01 PROCEDURE — 36415 COLL VENOUS BLD VENIPUNCTURE: CPT | Mod: ORL | Performed by: INTERNAL MEDICINE

## 2024-07-02 ENCOUNTER — TRANSITIONAL CARE UNIT VISIT (OUTPATIENT)
Dept: GERIATRICS | Facility: CLINIC | Age: 89
End: 2024-07-02
Payer: COMMERCIAL

## 2024-07-02 VITALS
WEIGHT: 103.6 LBS | SYSTOLIC BLOOD PRESSURE: 128 MMHG | HEIGHT: 64 IN | TEMPERATURE: 95.7 F | DIASTOLIC BLOOD PRESSURE: 57 MMHG | RESPIRATION RATE: 17 BRPM | OXYGEN SATURATION: 95 % | HEART RATE: 64 BPM | BODY MASS INDEX: 17.69 KG/M2

## 2024-07-02 DIAGNOSIS — U07.1 INFECTION DUE TO 2019 NOVEL CORONAVIRUS: Primary | ICD-10-CM

## 2024-07-02 DIAGNOSIS — I10 BENIGN ESSENTIAL HYPERTENSION: ICD-10-CM

## 2024-07-02 DIAGNOSIS — G89.29 CHRONIC LOW BACK PAIN WITHOUT SCIATICA, UNSPECIFIED BACK PAIN LATERALITY: ICD-10-CM

## 2024-07-02 DIAGNOSIS — A04.72 CLOSTRIDIUM DIFFICILE COLITIS: ICD-10-CM

## 2024-07-02 DIAGNOSIS — E46 MALNUTRITION, UNSPECIFIED TYPE (H): ICD-10-CM

## 2024-07-02 DIAGNOSIS — M54.50 CHRONIC LOW BACK PAIN WITHOUT SCIATICA, UNSPECIFIED BACK PAIN LATERALITY: ICD-10-CM

## 2024-07-02 PROCEDURE — 99309 SBSQ NF CARE MODERATE MDM 30: CPT | Performed by: PHYSICIAN ASSISTANT

## 2024-07-02 NOTE — PROGRESS NOTES
Chief Complaint   Patient presents with    Nursing Home Acute       HPI:  Daya Ignacio is a 89 year old  (10/11/1930), who is being seen today for an episodic care visit at: [facility] ([facility]) [[id number]].     Brief summary: Daya Ignacio is a 89-year-old female with a past medical history of hypertension, chronic back pain, mild cognitive impairment, GERD, previous C. difficile, history of right tib-fib fracture with recent recurrent hospitalizations at [facility].    Initially hospitalized 5/1 - 5/8 after presenting from her [facility] with a fall. Found to have a distal tib-fib fracture.  Orthopedics consulted underwent surgical ORIF.  Postoperative course complicated by hypoxia with cough.  Chest x-ray negative.  Treated supportively.  Assessed by therapies and discharged to U    Readmitted 5/25 - 5/31 after presenting from [facility] with abdominal pain and diarrhea.  Found to have significant leukocytosis at 28.  CT scan concerning for colitis.  C. difficile PCR and toxin positive.  GI consulted and recommended p.o. vancomycin and taper but may consider FMT given severity.  Hospital course complicated by acute chronic anemia for which she required a transfusion.  Now discharged back to U    Readmitted 6/21 - 6/24/2024 for increasing confusion and refusal to take p.o. meds.  This was in the setting of recently diagnosed COVID-19 for which she had been initiated on Paxlovid at [facility].  Additionally, found to have E. coli urinary tract infection for which she was treated with antibiotics.  Blood pressure medications were adjusted.  Discharged back to U      Today's concern is: Spoke with RN. Initially declining meds on occasion but recent complaint.    Daya is evaluated in her room. Just finished breakfast. States she ate ok. Mentions she really wants to discharge home. Remains NWB LLE but has Ortho appointment tomorrow and hoping for WB status change. Pain ok. Denies abdominal pain or  "diarrhea      Review of nursing home EMR:-159      Allergies, and PMH/PSH reviewed in [facility] today.    REVIEW OF SYSTEMS:  4 point ROS including Respiratory, CV, GI and , other than that noted in the HPI,  is negative    Objective:   /57   Pulse 64   Temp (!) 95.7  F (35.4  C)   Resp 17   Ht 1.626 m (5' 4\")   Wt 47 kg (103 lb 9.6 oz)   SpO2 95%   BMI 17.78 kg/m      Physical Exam  Vitals (Facility EMR) reviewed.   Constitutional:       General: She is not in acute distress.  HENT:      Head: Normocephalic and atraumatic.   Eyes:      General: No scleral icterus.  Cardiovascular:      Rate and Rhythm: Normal rate and regular rhythm.      Heart sounds: No murmur heard.  Pulmonary:      Effort: Pulmonary effort is normal.      Breath sounds: No wheezing or rales.   Abdominal:      General: There is no distension.      Tenderness: There is no abdominal tenderness.   Musculoskeletal:      Right lower leg: No edema.      Left lower leg: No edema.   Skin:     General: Skin is warm and dry.      Findings: No rash.   Neurological:      Mental Status: She is alert. Mental status is at baseline.   Psychiatric:         Behavior: Behavior normal.        MED REC REQUIRED  Post Medication Reconciliation Status: discharge medications reconciled and changed, per note/orders      Recent labs in [facility] reviewed by me today.  and Most Recent 3 CBC's:  Recent Labs   Lab Test 07/01/24  0617 06/24/24  0655 06/21/24  2256   WBC 4.9 4.1 7.3   HGB 8.1* 8.0* 9.3*   * 99 98    189 293     Most Recent 3 BMP's:  Recent Labs   Lab Test 07/01/24  0617 06/24/24  0655 06/21/24  2256    134* 132*   POTASSIUM 4.1 3.8 4.2   CHLORIDE 102 100 97*   CO2 26 23 22   BUN 14.6 10.3 21.2   CR 0.45* 0.41* 0.52   ANIONGAP 8 11 13   DAVE 9.4 9.2 10.1*   GLC 89 79 100*     Most Recent 2 LFT's:  Recent Labs   Lab Test 06/21/24 2256 05/28/24  0635   AST 8 10   ALT 7 5   ALKPHOS 93 179*   BILITOTAL 1.2 0.6 "       Assessment/Plan:  E. coli UTI: Resistant to fluoroquinolones.  Treated with ceftriaxone during hospital stay and transition to cephalexin  - Completed course of cephalexin 6/28      COVID-19, recovered:  - Completed course of Paxlovid  - No longer on contact precautions    Acute encephalopathy, improved  Dementia: Baseline cognitive impairment/dementia. SLUMS 5/2024 at TCU 8/30.  Worsening confusion in the setting of COVID and UTI.  - Reorient as needed  - OT and social work following      C. difficile colitis: Presented for evaluation of abdominal pain and diarrhea.  CT scan with evidence of colitis and esophagitis.  C. difficile PCR and toxin positive.  GI consulted and recommended p.o. vancomycin with consideration for outpatient FMT  -Recently had [facility] follow up.  Planning to continue suppressive vancomycin. [facility] working on getting FMT covered by insurance.  Appears prophylactic vancomycin fell off facility MAR.  Will resume daily with no end date  - Continue regular diet    Acute on chronic anemia  History of GI bleed  Esophagitis:   - Continue Protonix twice daily  - Hemoglobin 7/1 stable at 8.1  -Monitor as clinically appropriate      Fall  Acute comminuted distal left tibia/fibular fracture status post ORIF 5/3/2024:   - Continues NWB at least until next Ortho Follow up 7/3  -Pain control as discussed below  - PT/OT/social work.  - Continue vitamin D supplementation:    Acute on Chronic pain Back Pain  H/o Vertebral compression Fx T11-L3: Chronic back pain in the setting of previous compression fractures.  - Continue scheduled Tylenol  - Continue as needed methocarbamol and Dilaudid.        Hypertension: BP soft during hospital stay.  Amlodipine discontinued and lisinopril reduced to 20-10 mg  - SBPs 140s-150s but overall appropriate for age  - Continue off amlodipine  - Continue reduced lisinopril  - Continue baseline Coreg, Isordil and clonidine      CKD stage IIIa: Estimated Creatinine Clearance:  58 mL/min (A) (based on SCr of 0.45 mg/dL (L)).  -Recent BMP within baseline      MDD  Anxiety  -Continue Zoloft    Malnutrition  Failure to Thrive: Has had significant weight loss since admission to TCU.  Not unexpected given dementia as well as hospitalization recurrent hospitalizations for C. difficile colitis, COVID and UTI  -Minimal p.o. intake  - Facility nutritionist following  Wt Readings from Last 4 Encounters:   07/02/24 47 kg (103 lb 9.6 oz)   06/25/24 47 kg (103 lb 9.6 oz)   06/21/24 46.7 kg (103 lb)   06/21/24 47 kg (103 lb 9.6 oz)        Orders:  NNO    Electronically signed by: Olivia Melara PA-C

## 2024-07-02 NOTE — LETTER
7/2/2024      Daya Ignacio  [id number]  [place] [address]          Chief Complaint   Patient presents with     Nursing Home Acute       HPI:  Daya Ignacio is a 89 year old  (10/11/1930), who is being seen today for an episodic care visit at: [facility] ([facility]) [[id number]].     Brief summary: Daya Ignacio is a 89-year-old female with a past medical history of hypertension, chronic back pain, mild cognitive impairment, GERD, previous C. difficile, history of right tib-fib fracture with recent recurrent hospitalizations at [facility].    Initially hospitalized 5/1 - 5/8 after presenting from her [facility] with a fall. Found to have a distal tib-fib fracture.  Orthopedics consulted underwent surgical ORIF.  Postoperative course complicated by hypoxia with cough.  Chest x-ray negative.  Treated supportively.  Assessed by therapies and discharged to U    Readmitted 5/25 - 5/31 after presenting from U with abdominal pain and diarrhea.  Found to have significant leukocytosis at 28.  CT scan concerning for colitis.  C. difficile PCR and toxin positive.  GI consulted and recommended p.o. vancomycin and taper but may consider FMT given severity.  Hospital course complicated by acute chronic anemia for which she required a transfusion.  Now discharged back to U    Readmitted 6/21 - 6/24/2024 for increasing confusion and refusal to take p.o. meds.  This was in the setting of recently diagnosed COVID-19 for which she had been initiated on Paxlovid at U.  Additionally, found to have E. coli urinary tract infection for which she was treated with antibiotics.  Blood pressure medications were adjusted.  Discharged back to U      Today's concern is: Spoke with RN. Initially declining meds on occasion but recent complaint.    Daya is evaluated in her room. Just finished breakfast. States she ate ok. Mentions she really wants to discharge home. Remains NWB LLE but has Ortho  "appointment tomorrow and hoping for WB status change. Pain ok. Denies abdominal pain or diarrhea      Review of nursing home EMR:-159      Allergies, and PMH/PSH reviewed in [facility] today.    REVIEW OF SYSTEMS:  4 point ROS including Respiratory, CV, GI and , other than that noted in the HPI,  is negative    Objective:   /57   Pulse 64   Temp (!) 95.7  F (35.4  C)   Resp 17   Ht 1.626 m (5' 4\")   Wt 47 kg (103 lb 9.6 oz)   SpO2 95%   BMI 17.78 kg/m      Physical Exam  Vitals (Facility EMR) reviewed.   Constitutional:       General: She is not in acute distress.  HENT:      Head: Normocephalic and atraumatic.   Eyes:      General: No scleral icterus.  Cardiovascular:      Rate and Rhythm: Normal rate and regular rhythm.      Heart sounds: No murmur heard.  Pulmonary:      Effort: Pulmonary effort is normal.      Breath sounds: No wheezing or rales.   Abdominal:      General: There is no distension.      Tenderness: There is no abdominal tenderness.   Musculoskeletal:      Right lower leg: No edema.      Left lower leg: No edema.   Skin:     General: Skin is warm and dry.      Findings: No rash.   Neurological:      Mental Status: She is alert. Mental status is at baseline.   Psychiatric:         Behavior: Behavior normal.        MED REC REQUIRED  Post Medication Reconciliation Status: discharge medications reconciled and changed, per note/orders      Recent labs in [facility] reviewed by me today.  and Most Recent 3 CBC's:  Recent Labs   Lab Test 07/01/24  0617 06/24/24  0655 06/21/24  2256   WBC 4.9 4.1 7.3   HGB 8.1* 8.0* 9.3*   * 99 98    189 293     Most Recent 3 BMP's:  Recent Labs   Lab Test 07/01/24  0617 06/24/24  0655 06/21/24  2256    134* 132*   POTASSIUM 4.1 3.8 4.2   CHLORIDE 102 100 97*   CO2 26 23 22   BUN 14.6 10.3 21.2   CR 0.45* 0.41* 0.52   ANIONGAP 8 11 13   DAVE 9.4 9.2 10.1*   GLC 89 79 100*     Most Recent 2 LFT's:  Recent Labs   Lab Test 06/21/24  2256 " 05/28/24  0635   AST 8 10   ALT 7 5   ALKPHOS 93 179*   BILITOTAL 1.2 0.6       Assessment/Plan:  E. coli UTI: Resistant to fluoroquinolones.  Treated with ceftriaxone during hospital stay and transition to cephalexin  - Completed course of cephalexin 6/28      COVID-19, recovered:  - Completed course of Paxlovid  - No longer on contact precautions    Acute encephalopathy, improved  Dementia: Baseline cognitive impairment/dementia. SLUMS 5/2024 at U 8/30.  Worsening confusion in the setting of COVID and UTI.  - Reorient as needed  - OT and social work following      C. difficile colitis: Presented for evaluation of abdominal pain and diarrhea.  CT scan with evidence of colitis and esophagitis.  C. difficile PCR and toxin positive.  GI consulted and recommended p.o. vancomycin with consideration for outpatient FMT  -Recently had [facility] follow up.  Planning to continue suppressive vancomycin. [facility] working on getting FMT covered by insurance.  Appears prophylactic vancomycin fell off facility MAR.  Will resume daily with no end date  - Continue regular diet    Acute on chronic anemia  History of GI bleed  Esophagitis:   - Continue Protonix twice daily  - Hemoglobin 7/1 stable at 8.1  -Monitor as clinically appropriate      Fall  Acute comminuted distal left tibia/fibular fracture status post ORIF 5/3/2024:   - Continues NWB at least until next Ortho Follow up 7/3  -Pain control as discussed below  - PT/OT/social work.  - Continue vitamin D supplementation:    Acute on Chronic pain Back Pain  H/o Vertebral compression Fx T11-L3: Chronic back pain in the setting of previous compression fractures.  - Continue scheduled Tylenol  - Continue as needed methocarbamol and Dilaudid.        Hypertension: BP soft during hospital stay.  Amlodipine discontinued and lisinopril reduced to 20-10 mg  - SBPs 140s-150s but overall appropriate for age  - Continue off amlodipine  - Continue reduced lisinopril  - Continue baseline Coreg,  Isordil and clonidine      CKD stage IIIa: Estimated Creatinine Clearance: 58 mL/min (A) (based on SCr of 0.45 mg/dL (L)).  -Recent BMP within baseline      MDD  Anxiety  -Continue Zoloft    Malnutrition  Failure to Thrive: Has had significant weight loss since admission to TCU.  Not unexpected given dementia as well as hospitalization recurrent hospitalizations for C. difficile colitis, COVID and UTI  -Minimal p.o. intake  - Facility nutritionist following  Wt Readings from Last 4 Encounters:   07/02/24 47 kg (103 lb 9.6 oz)   06/25/24 47 kg (103 lb 9.6 oz)   06/21/24 46.7 kg (103 lb)   06/21/24 47 kg (103 lb 9.6 oz)        Orders:  NNO    Electronically signed by: Olivia Melara PA-C         Sincerely,        Olivia Melara PA-C

## 2024-07-08 ENCOUNTER — TRANSITIONAL CARE UNIT VISIT (OUTPATIENT)
Dept: GERIATRICS | Facility: CLINIC | Age: 89
End: 2024-07-08
Payer: COMMERCIAL

## 2024-07-08 VITALS
DIASTOLIC BLOOD PRESSURE: 65 MMHG | WEIGHT: 103.6 LBS | SYSTOLIC BLOOD PRESSURE: 158 MMHG | TEMPERATURE: 97.8 F | OXYGEN SATURATION: 98 % | HEIGHT: 64 IN | RESPIRATION RATE: 16 BRPM | BODY MASS INDEX: 17.69 KG/M2 | HEART RATE: 61 BPM

## 2024-07-08 DIAGNOSIS — S82.302D CLOSED FRACTURE OF DISTAL END OF LEFT FIBULA AND TIBIA WITH ROUTINE HEALING, SUBSEQUENT ENCOUNTER: Primary | ICD-10-CM

## 2024-07-08 DIAGNOSIS — R63.4 WEIGHT LOSS: ICD-10-CM

## 2024-07-08 DIAGNOSIS — M54.50 CHRONIC LOW BACK PAIN WITHOUT SCIATICA, UNSPECIFIED BACK PAIN LATERALITY: ICD-10-CM

## 2024-07-08 DIAGNOSIS — F03.B3 MODERATE DEMENTIA WITH MOOD DISTURBANCE, UNSPECIFIED DEMENTIA TYPE (H): ICD-10-CM

## 2024-07-08 DIAGNOSIS — Z86.16 HISTORY OF COVID-19: ICD-10-CM

## 2024-07-08 DIAGNOSIS — R54 FRAILTY: ICD-10-CM

## 2024-07-08 DIAGNOSIS — D64.9 ANEMIA, UNSPECIFIED TYPE: ICD-10-CM

## 2024-07-08 DIAGNOSIS — S82.832D CLOSED FRACTURE OF DISTAL END OF LEFT FIBULA AND TIBIA WITH ROUTINE HEALING, SUBSEQUENT ENCOUNTER: Primary | ICD-10-CM

## 2024-07-08 DIAGNOSIS — G89.29 CHRONIC LOW BACK PAIN WITHOUT SCIATICA, UNSPECIFIED BACK PAIN LATERALITY: ICD-10-CM

## 2024-07-08 DIAGNOSIS — A04.72 CLOSTRIDIUM DIFFICILE COLITIS: ICD-10-CM

## 2024-07-08 PROCEDURE — 99310 SBSQ NF CARE HIGH MDM 45: CPT | Performed by: INTERNAL MEDICINE

## 2024-07-08 RX ORDER — VANCOMYCIN HYDROCHLORIDE 125 MG/1
125 CAPSULE ORAL DAILY
COMMUNITY
End: 2024-07-31

## 2024-07-08 NOTE — PROGRESS NOTES
[facility]  PHYSICIAN NOTE    Chief Complaint   Patient presents with    penitentiary Regulatory       HPI:    Daya Ignacio is a 89 year old  (10/11/1930), who is being seen today for a federally mandated E/M visit at [facility]. [facility] an unwitnessed fall beginning of May unfortunately sustaining left distal tibia/fibula fractures s/p ORIF with intramedullary nailing. Postop did need blood transfusion for acute on chronic anemia. Also was unable to wean off of O2 postop thought to have component of atelectasis. Discharged to TCU for rehab/cares remaining on O2 therapy which subsequently weaned off.    Admitted to the hospital from TCU again in late May with symptoms of colitis found to have C.diff treated with oral Vancomycin. Also needed blood transfusion for her anemia and her DVT prophy ASA was discontinued.     Tested positive for COVID infection amid facility outbreak on 6/20/24 and was treated with Paxlovid. Then needed hospitalization again shortly thereafter for worsening delirium amid COVID infection, dehydration, lack of medication adherence and found as well to have recurrent UTI treated with antimicrobial therapy. Incidental adnexal lesion to follow up as clinically warranted. Discharged back here to TCU.    Recent vitals: Afebrile, -150/50-70s with HR 60-70s. Need updated weight now that she is off of COVID precautions. She continues to be NWB LLE but has [facility] appointment with Dr. Colin Evans tomorrow afternoon.    Daya is seen resting in bed this afternoon; dozing but awakes to voice. She says that her leg aches at times and blames it on the weather though today is a rather alma rosa but humid day.  Does not feel she needs anything at this time.  Breathing is nonlabored and currently remains on room air rather than previous oxygen use.  Denies any abdominal complaints. On review of MAR, PRN  Dilaudid given approximately once every other day and only two doses of PRN Robaxin given this month.    Past Medical History:   Diagnosis Date    Anemia     Anxiety     Arthritis     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 (H28) 04/16/2014    Chronic low back pain     Depression     h/o Clostridium difficile colitis 06/2019    h/o Mumps     Hypertension     Kidney stone     Lumbar compression fracture     Macular degeneration (senile) of retina, unspecified     Mild cognitive impairment     NSTEMI (non-ST elevated myocardial infarction) (H) 05/28/2017    Recurrent UTI     Seasonal allergic rhinitis     Upper GI bleed 06/2019    Urinary tract infection due to extended-spectrum beta lactamase (ESBL)-producing Klebsiella 05/2019    resistant to Bactrim        CODE STATUS: DNR/I    ALLERGIES: Atorvastatin, Nitrofurantoin, Penicillins, Sulfa antibiotics, and Sulfasalazine    MEDICATIONS: Reviewed and updated in [facility] according to facility MAR  Current Outpatient Medications   Medication Sig Dispense Refill    acetaminophen (TYLENOL) 500 MG tablet Take 1,000 mg by mouth 3 times daily      carvedilol (COREG) 12.5 MG tablet Take 12.5 mg by mouth 2 times daily (with meals)      Cholecalciferol (VITAMIN D3) 2000 UNITS CAPS Take 2,000 Units by mouth daily       cloNIDine (CATAPRES) 0.1 MG tablet Take 0.1 mg by mouth at bedtime      cyanocobalamin (VITAMIN B-12) 1000 MCG tablet Take 1,000 mcg by mouth daily      Ferrous Gluconate 324 (37.5 Fe) MG TABS Take 1 tablet by mouth daily      HYDROmorphone (DILAUDID) 2 MG tablet Take 0.5 tablets (1 mg) by mouth every 6 hours as needed for moderate pain 10 tablet 0    isosorbide dinitrate (ISORDIL) 10 MG tablet Take 1 tablet (10 mg) by mouth 3 times daily 270 tablet 3    lisinopril (ZESTRIL) 10 MG tablet Take 1 tablet (10 mg) by mouth daily      melatonin 3 MG tablet Take 3 mg by mouth nightly as needed for sleep      methocarbamol (ROBAXIN) 500 MG tablet Take 0.5 tablets (250 mg) by  "mouth 3 times daily as needed      Multiple Vitamins-Minerals (PRESERVISION AREDS) CAPS Take 1 capsule by mouth 2 times daily       nystatin (MYCOSTATIN) 447151 UNIT/GM external cream Apply topically 2 times daily as needed (rash)      ondansetron (ZOFRAN) 4 MG tablet Take 4 mg by mouth every 8 hours as needed for nausea      polyethylene glycol (MIRALAX/GLYCOLAX) packet Take 17 g by mouth daily as needed for constipation      SENNA-docusate sodium (SENNA S) 8.6-50 MG tablet Take 1 tablet by mouth at bedtime      sertraline (ZOLOFT) 100 MG tablet Take 100 mg by mouth daily      vancomycin (VANCOCIN) 125 MG capsule Take 125 mg by mouth daily         ROS:  Limited secondary to cognitive impairment but today pt reports as above in HPI    Exam:  BP (!) 158/65   Pulse 61   Temp 97.8  F (36.6  C)   Resp 16   Ht 1.626 m (5' 4\")   Wt 47 kg (103 lb 9.6 oz)   SpO2 98%   BMI 17.78 kg/m    Dozing in bed but awakes easily to voice; wearing hospital gown  Slightly dry oral mucosa but drinks sips of water through a straw when encouraged to do so without cough  Heart tones regular without murmurs rubs or gallops  Breathing nonlabored on room air  Abdomen soft, nontender, positive bowel sounds  No lower extremity edema  Bilateral feet in heel protection boots  Mood euthymic, vague historian    Lab/Diagnostic Data:    Most Recent 3 CBC's:  Recent Labs   Lab Test 07/01/24  0617 06/24/24  0655 06/21/24  2256   WBC 4.9 4.1 7.3   HGB 8.1* 8.0* 9.3*   * 99 98    189 293     Vitamin B12 >4000 while on Vitamin B12 supplementation at 1000 mcg daily and Folate within normal limits at 13.9 in May 2024  Most Recent 3 BMP's:  Recent Labs   Lab Test 07/01/24  0617 06/24/24  0655 06/21/24  2256    134* 132*   POTASSIUM 4.1 3.8 4.2   CHLORIDE 102 100 97*   CO2 26 23 22   BUN 14.6 10.3 21.2   CR 0.45* 0.41* 0.52   ANIONGAP 8 11 13   DAVE 9.4 9.2 10.1*   GLC 89 79 100*   Estimated Creatinine Clearance: 58 mL/min (A) (based " on SCr of 0.45 mg/dL (L)).  Most Recent 2 LFT's:  Recent Labs   Lab Test 06/21/24  2256 05/28/24  0635   AST 8 10   ALT 7 5   ALKPHOS 93 179*   BILITOTAL 1.2 0.6     Most Recent TSH and T4:  Recent Labs   Lab Test 05/29/24  0736   TSH 1.89     June 2024 Head CT:  INTRACRANIAL CONTENTS: No intracranial hemorrhage, extraaxial collection, or mass effect. No CT evidence of acute infarct. Mild presumed chronic small vessel ischemic changes. Mild to moderate generalized volume loss. No hydrocephalus.     June 2024 CT Abdomen/pelvis:  IMPRESSION:   1.  Suspected constipation.  2.  Hepatic steatosis.  3.  Cholelithiasis.  4.  Nonobstructing 7 mm left renal stone.  5.  Colonic diverticulosis.  6.  Bilobed, cystic left adnexal lesion, measuring up to 3.5 cm. Nonemergent pelvic sonography follow-up is recommended.      ASSESSMENT/PLAN:  Closed fracture of distal end of left fibula and tibia with routine healing, subsequent encounter  Chronic low back pain without sciatica, unspecified back pain laterality  Frailty  Continue support and care of facility and on-going therapy  Quite frail  Minimal use of PRN Dilaudid or Robaxin; will discontinue PRN Robaxin today amid her cognitive impairment   Is on scheduled Tylenol  Some degree of chronic back pain and previous compression fractures  Does have follow up with [facility] Dr. Colin Evans tomorrow as currently still NWB LLE but may have adjustment in this pending how ortho appointment goes tomorrow    Anemia, unspecified type  Anemia persists; likely multifactorial  Iron counts ok with elevated ferritin (inflammation) in June 2024  Vitamin B12 >4000 while on Vitamin B12 supplementation at 1000 mcg daily and Folate within normal limits at 13.9 in May 2024   No current signs/sx of bleeding but past h/o GI bleed and is on PPI  Will discontinue Vitamin B12 supplement given high level on lab and change her daily iron to just MWF administration for hopeful better absorption and maybe help  improve appetite to be on lower iron dose  Labs ordered for 7/15/24: CBC and BMP    Moderate dementia with mood disturbance, unspecified dementia type (H)  Vague historian today but is calm; monitor for hypoactive delirium  Cognitive testing thus far at TCU: JANY 8/30 in May 2024    Clostridium difficile colitis  Now down to Vancomycin 125 mg daily without end date  No reports of GI symptoms today  Follow up with [facility] again in the future - per notes they are considering fecal transplant    History of COVID-19  S/p Paxlovid therapy  Now resolved but contributed to slower recovery overall after initial tib/fib fracture as well as her poor appetite/weight loss and hospitalizations with encephalopathy  Glad she is breathing comfortably on RA (previous hypoxia upon initial admission)    Weight loss  Multifactorial d/t all of the above  Non-acute abdominal exam today  Ordered updated weight as still need one since most recent hospitalization  Incidental finding most recent hospital stay of a cystic adnexal lesion on CT to follow up based on goals of care with PCP as described above      Electronically signed by:  Gisela Meza DO

## 2024-07-08 NOTE — Clinical Note
JESSICA, labs ordered for Monday 7/15. I discontinued her B12 and Robaxin and reduced iron to MWF. She should have had [facility] follow up today. Does have incidental cystic adnexal lesion on recent CT to note for follow up with PCP if in line with goals of care. Thanks!

## 2024-07-08 NOTE — LETTER
7/8/2024      Daya Ignacio  [address]  [facility] [address]        [facility]  PHYSICIAN NOTE    Chief Complaint   Patient presents with     custodial Regulatory       HPI:    Daya Ignacio is a 89 year old  (10/11/1930), who is being seen today for a federally mandated E/M visit at [facility]. Initially admitted to the hospital from her [facility] after an unwitnessed fall beginning of May unfortunately sustaining left distal tibia/fibula fractures s/p ORIF with intramedullary nailing. Postop did need blood transfusion for acute on chronic anemia. Also was unable to wean off of O2 postop thought to have component of atelectasis. Discharged to TCU for rehab/cares remaining on O2 therapy which subsequently weaned off.    Admitted to the hospital from TCU again in late May with symptoms of colitis found to have C.diff treated with oral Vancomycin. Also needed blood transfusion for her anemia and her DVT prophy ASA was discontinued.     Tested positive for COVID infection amid facility outbreak on 6/20/24 and was treated with Paxlovid. Then needed hospitalization again shortly thereafter for worsening delirium amid COVID infection, dehydration, lack of medication adherence and found as well to have recurrent UTI treated with antimicrobial therapy. Incidental adnexal lesion to follow up as clinically warranted. Discharged back here to TCU.    Recent vitals: Afebrile, -150/50-70s with HR 60-70s. Need updated weight now that she is off of COVID precautions. She continues to be NWB LLE but has [facility] appointment with Dr. Colin Evans tomorrow afternoon.    Daya is seen resting in bed this afternoon; dozing but awakes to voice. She says that her leg aches at times and blames it on the weather though today is a rather alma rosa but humid day.  Does not feel she needs anything at this time.  Breathing is nonlabored and currently remains on room air  rather than previous oxygen use.  Denies any abdominal complaints. On review of MAR, PRN Dilaudid given approximately once every other day and only two doses of PRN Robaxin given this month.    Past Medical History:   Diagnosis Date     Anemia      Anxiety      Arthritis      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 (H28) 04/16/2014     Chronic low back pain      Depression      h/o Clostridium difficile colitis 06/2019     h/o Mumps      Hypertension      Kidney stone      Lumbar compression fracture      Macular degeneration (senile) of retina, unspecified      Mild cognitive impairment      NSTEMI (non-ST elevated myocardial infarction) (H) 05/28/2017     Recurrent UTI      Seasonal allergic rhinitis      Upper GI bleed 06/2019     Urinary tract infection due to extended-spectrum beta lactamase (ESBL)-producing Klebsiella 05/2019    resistant to Bactrim        CODE STATUS: DNR/I    ALLERGIES: Atorvastatin, Nitrofurantoin, Penicillins, Sulfa antibiotics, and Sulfasalazine    MEDICATIONS: Reviewed and updated in [facility] according to facility MAR  Current Outpatient Medications   Medication Sig Dispense Refill     acetaminophen (TYLENOL) 500 MG tablet Take 1,000 mg by mouth 3 times daily       carvedilol (COREG) 12.5 MG tablet Take 12.5 mg by mouth 2 times daily (with meals)       Cholecalciferol (VITAMIN D3) 2000 UNITS CAPS Take 2,000 Units by mouth daily        cloNIDine (CATAPRES) 0.1 MG tablet Take 0.1 mg by mouth at bedtime       cyanocobalamin (VITAMIN B-12) 1000 MCG tablet Take 1,000 mcg by mouth daily       Ferrous Gluconate 324 (37.5 Fe) MG TABS Take 1 tablet by mouth daily       HYDROmorphone (DILAUDID) 2 MG tablet Take 0.5 tablets (1 mg) by mouth every 6 hours as needed for moderate pain 10 tablet 0     isosorbide dinitrate (ISORDIL) 10 MG tablet Take 1 tablet (10 mg) by mouth 3 times daily 270 tablet 3     lisinopril (ZESTRIL) 10 MG tablet Take 1 tablet (10 mg) by mouth daily       melatonin 3 MG tablet  "Take 3 mg by mouth nightly as needed for sleep       methocarbamol (ROBAXIN) 500 MG tablet Take 0.5 tablets (250 mg) by mouth 3 times daily as needed       Multiple Vitamins-Minerals (PRESERVISION AREDS) CAPS Take 1 capsule by mouth 2 times daily        nystatin (MYCOSTATIN) 227061 UNIT/GM external cream Apply topically 2 times daily as needed (rash)       ondansetron (ZOFRAN) 4 MG tablet Take 4 mg by mouth every 8 hours as needed for nausea       polyethylene glycol (MIRALAX/GLYCOLAX) packet Take 17 g by mouth daily as needed for constipation       SENNA-docusate sodium (SENNA S) 8.6-50 MG tablet Take 1 tablet by mouth at bedtime       sertraline (ZOLOFT) 100 MG tablet Take 100 mg by mouth daily       vancomycin (VANCOCIN) 125 MG capsule Take 125 mg by mouth daily         ROS:  Limited secondary to cognitive impairment but today pt reports as above in HPI    Exam:  BP (!) 158/65   Pulse 61   Temp 97.8  F (36.6  C)   Resp 16   Ht 1.626 m (5' 4\")   Wt 47 kg (103 lb 9.6 oz)   SpO2 98%   BMI 17.78 kg/m    Dozing in bed but awakes easily to voice; wearing hospital gown  Slightly dry oral mucosa but drinks sips of water through a straw when encouraged to do so without cough  Heart tones regular without murmurs rubs or gallops  Breathing nonlabored on room air  Abdomen soft, nontender, positive bowel sounds  No lower extremity edema  Bilateral feet in heel protection boots  Mood euthymic, vague historian    Lab/Diagnostic Data:    Most Recent 3 CBC's:  Recent Labs   Lab Test 07/01/24  0617 06/24/24  0655 06/21/24  2256   WBC 4.9 4.1 7.3   HGB 8.1* 8.0* 9.3*   * 99 98    189 293     Vitamin B12 >4000 while on Vitamin B12 supplementation at 1000 mcg daily and Folate within normal limits at 13.9 in May 2024  Most Recent 3 BMP's:  Recent Labs   Lab Test 07/01/24  0617 06/24/24  0655 06/21/24  2256    134* 132*   POTASSIUM 4.1 3.8 4.2   CHLORIDE 102 100 97*   CO2 26 23 22   BUN 14.6 10.3 21.2   CR " 0.45* 0.41* 0.52   ANIONGAP 8 11 13   DAVE 9.4 9.2 10.1*   GLC 89 79 100*   Estimated Creatinine Clearance: 58 mL/min (A) (based on SCr of 0.45 mg/dL (L)).  Most Recent 2 LFT's:  Recent Labs   Lab Test 06/21/24  2256 05/28/24  0635   AST 8 10   ALT 7 5   ALKPHOS 93 179*   BILITOTAL 1.2 0.6     Most Recent TSH and T4:  Recent Labs   Lab Test 05/29/24  0736   TSH 1.89     June 2024 Head CT:  INTRACRANIAL CONTENTS: No intracranial hemorrhage, extraaxial collection, or mass effect. No CT evidence of acute infarct. Mild presumed chronic small vessel ischemic changes. Mild to moderate generalized volume loss. No hydrocephalus.     June 2024 CT Abdomen/pelvis:  IMPRESSION:   1.  Suspected constipation.  2.  Hepatic steatosis.  3.  Cholelithiasis.  4.  Nonobstructing 7 mm left renal stone.  5.  Colonic diverticulosis.  6.  Bilobed, cystic left adnexal lesion, measuring up to 3.5 cm. Nonemergent pelvic sonography follow-up is recommended.      ASSESSMENT/PLAN:  Closed fracture of distal end of left fibula and tibia with routine healing, subsequent encounter  Chronic low back pain without sciatica, unspecified back pain laterality  Frailty  Continue support and care of facility and on-going therapy  Quite frail  Minimal use of PRN Dilaudid or Robaxin; will discontinue PRN Robaxin today amid her cognitive impairment   Is on scheduled Tylenol  Some degree of chronic back pain and previous compression fractures  Does have follow up with [facility] Dr. Colin Evans tomorrow as currently still NWB LLE but may have adjustment in this pending how ortho appointment goes tomorrow    Anemia, unspecified type  Anemia persists; likely multifactorial  Iron counts ok with elevated ferritin (inflammation) in June 2024  Vitamin B12 >4000 while on Vitamin B12 supplementation at 1000 mcg daily and Folate within normal limits at 13.9 in May 2024   No current signs/sx of bleeding but past h/o GI bleed and is on PPI  Will discontinue Vitamin B12  supplement given high level on lab and change her daily iron to just MWF administration for hopeful better absorption and maybe help improve appetite to be on lower iron dose  Labs ordered for 7/15/24: CBC and BMP    Moderate dementia with mood disturbance, unspecified dementia type (H)  Vague historian today but is calm; monitor for hypoactive delirium  Cognitive testing thus far at TCU: MYESHA 8/30 in May 2024    Clostridium difficile colitis  Now down to Vancomycin 125 mg daily without end date  No reports of GI symptoms today  Follow up with [facility] again in the future - per notes they are considering fecal transplant    History of COVID-19  S/p Paxlovid therapy  Now resolved but contributed to slower recovery overall after initial tib/fib fracture as well as her poor appetite/weight loss and hospitalizations with encephalopathy  Glad she is breathing comfortably on RA (previous hypoxia upon initial admission)    Weight loss  Multifactorial d/t all of the above  Non-acute abdominal exam today  Ordered updated weight as still need one since most recent hospitalization  Incidental finding most recent hospital stay of a cystic adnexal lesion on CT to follow up based on goals of care with PCP as described above      Electronically signed by:  Gisela Meza DO      Sincerely,        Gisela Meza DO

## 2024-07-09 ENCOUNTER — TRANSFERRED RECORDS (OUTPATIENT)
Dept: HEALTH INFORMATION MANAGEMENT | Facility: CLINIC | Age: 89
End: 2024-07-09
Payer: COMMERCIAL

## 2024-07-11 ENCOUNTER — LAB REQUISITION (OUTPATIENT)
Dept: LAB | Facility: CLINIC | Age: 89
End: 2024-07-11
Payer: COMMERCIAL

## 2024-07-11 DIAGNOSIS — U07.1 COVID-19: ICD-10-CM

## 2024-07-11 DIAGNOSIS — S82.252D DISPLACED COMMINUTED FRACTURE OF SHAFT OF LEFT TIBIA, SUBSEQUENT ENCOUNTER FOR CLOSED FRACTURE WITH ROUTINE HEALING: ICD-10-CM

## 2024-07-11 DIAGNOSIS — S82.62XD DISPLACED FRACTURE OF LATERAL MALLEOLUS OF LEFT FIBULA, SUBSEQUENT ENCOUNTER FOR CLOSED FRACTURE WITH ROUTINE HEALING: ICD-10-CM

## 2024-07-11 RX ORDER — LISINOPRIL 10 MG/1
15 TABLET ORAL DAILY
Status: SHIPPED | DISCHARGE
Start: 2024-07-11 | End: 2024-08-07

## 2024-07-11 RX ORDER — HYDROMORPHONE HYDROCHLORIDE 2 MG/1
1 TABLET ORAL EVERY 6 HOURS PRN
Qty: 10 TABLET | Refills: 0 | Status: SHIPPED | OUTPATIENT
Start: 2024-07-11 | End: 2024-07-31

## 2024-07-15 LAB
ANION GAP SERPL CALCULATED.3IONS-SCNC: 8 MMOL/L (ref 7–15)
BUN SERPL-MCNC: 22.7 MG/DL (ref 8–23)
CALCIUM SERPL-MCNC: 9.5 MG/DL (ref 8.2–9.6)
CHLORIDE SERPL-SCNC: 103 MMOL/L (ref 98–107)
CREAT SERPL-MCNC: 0.43 MG/DL (ref 0.51–0.95)
EGFRCR SERPLBLD CKD-EPI 2021: 90 ML/MIN/1.73M2
ERYTHROCYTE [DISTWIDTH] IN BLOOD BY AUTOMATED COUNT: 18.4 % (ref 10–15)
GLUCOSE SERPL-MCNC: 82 MG/DL (ref 70–99)
HCO3 SERPL-SCNC: 27 MMOL/L (ref 22–29)
HCT VFR BLD AUTO: 26.4 % (ref 35–47)
HGB BLD-MCNC: 8.3 G/DL (ref 11.7–15.7)
MCH RBC QN AUTO: 34 PG (ref 26.5–33)
MCHC RBC AUTO-ENTMCNC: 31.4 G/DL (ref 31.5–36.5)
MCV RBC AUTO: 108 FL (ref 78–100)
PLATELET # BLD AUTO: 212 10E3/UL (ref 150–450)
POTASSIUM SERPL-SCNC: 3.8 MMOL/L (ref 3.4–5.3)
RBC # BLD AUTO: 2.44 10E6/UL (ref 3.8–5.2)
SODIUM SERPL-SCNC: 138 MMOL/L (ref 135–145)
WBC # BLD AUTO: 6.7 10E3/UL (ref 4–11)

## 2024-07-15 PROCEDURE — 85027 COMPLETE CBC AUTOMATED: CPT | Mod: ORL | Performed by: INTERNAL MEDICINE

## 2024-07-15 PROCEDURE — 80048 BASIC METABOLIC PNL TOTAL CA: CPT | Mod: ORL | Performed by: INTERNAL MEDICINE

## 2024-07-15 PROCEDURE — 36415 COLL VENOUS BLD VENIPUNCTURE: CPT | Mod: ORL | Performed by: INTERNAL MEDICINE

## 2024-07-15 PROCEDURE — P9604 ONE-WAY ALLOW PRORATED TRIP: HCPCS | Mod: ORL | Performed by: INTERNAL MEDICINE

## 2024-07-17 ENCOUNTER — TRANSITIONAL CARE UNIT VISIT (OUTPATIENT)
Dept: GERIATRICS | Facility: CLINIC | Age: 89
End: 2024-07-17
Payer: COMMERCIAL

## 2024-07-17 VITALS
OXYGEN SATURATION: 96 % | RESPIRATION RATE: 17 BRPM | DIASTOLIC BLOOD PRESSURE: 64 MMHG | WEIGHT: 103.8 LBS | HEIGHT: 64 IN | SYSTOLIC BLOOD PRESSURE: 115 MMHG | HEART RATE: 63 BPM | BODY MASS INDEX: 17.72 KG/M2 | TEMPERATURE: 97.7 F

## 2024-07-17 DIAGNOSIS — M54.50 CHRONIC LOW BACK PAIN WITHOUT SCIATICA, UNSPECIFIED BACK PAIN LATERALITY: ICD-10-CM

## 2024-07-17 DIAGNOSIS — S82.302D CLOSED FRACTURE OF DISTAL END OF LEFT FIBULA AND TIBIA WITH ROUTINE HEALING, SUBSEQUENT ENCOUNTER: Primary | ICD-10-CM

## 2024-07-17 DIAGNOSIS — S82.832D CLOSED FRACTURE OF DISTAL END OF LEFT FIBULA AND TIBIA WITH ROUTINE HEALING, SUBSEQUENT ENCOUNTER: Primary | ICD-10-CM

## 2024-07-17 DIAGNOSIS — G89.29 CHRONIC LOW BACK PAIN WITHOUT SCIATICA, UNSPECIFIED BACK PAIN LATERALITY: ICD-10-CM

## 2024-07-17 DIAGNOSIS — I10 BENIGN ESSENTIAL HYPERTENSION: ICD-10-CM

## 2024-07-17 PROCEDURE — 99309 SBSQ NF CARE MODERATE MDM 30: CPT | Performed by: PHYSICIAN ASSISTANT

## 2024-07-17 NOTE — PROGRESS NOTES
Chief Complaint   Patient presents with    Nursing Home Acute       HPI:  Daya Ignacio is a 89 year old  (10/11/1930), who is being seen today for an episodic care visit at: [facility] ([facility]) [[id number]].     Brief summary: Daya Ignacio is a 89-year-old female with a past medical history of hypertension, chronic back pain, mild cognitive impairment, GERD, previous C. difficile, history of right tib-fib fracture with recent recurrent hospitalizations at [facility].    Initially hospitalized 5/1 - 5/8 after presenting from her [facility] with a fall. Found to have a distal tib-fib fracture.  Orthopedics consulted underwent surgical ORIF.  Postoperative course complicated by hypoxia with cough.  Chest x-ray negative.  Treated supportively.  Assessed by therapies and discharged to U    Readmitted 5/25 - 5/31 after presenting from [facility] with abdominal pain and diarrhea.  Found to have significant leukocytosis at 28.  CT scan concerning for colitis.  C. difficile PCR and toxin positive.  GI consulted and recommended p.o. vancomycin and taper but may consider FMT given severity.  Hospital course complicated by acute chronic anemia for which she required a transfusion.  Now discharged back to U    Readmitted 6/21 - 6/24/2024 for increasing confusion and refusal to take p.o. meds.  This was in the setting of recently diagnosed COVID-19 for which she had been initiated on Paxlovid at [facility].  Additionally, found to have E. coli urinary tract infection for which she was treated with antibiotics.  Blood pressure medications were adjusted.  Discharged back to U    Today's concern is: Had Ortho follow up last week. Now WBAT. Progress expectedly slow with therapy given deconditioning. RN notes occasionally pockets/spits out pills. They are crushing the meds that they can    Merle is evaluated in her room. Denies pain this am. States she ate breakfast.         Review of nursing home EMR:-153  Wt Readings  "from Last 4 Encounters:   07/17/24 47.1 kg (103 lb 12.8 oz)   07/08/24 47 kg (103 lb 9.6 oz)   07/02/24 47 kg (103 lb 9.6 oz)   06/25/24 47 kg (103 lb 9.6 oz)        Allergies, and PMH/PSH reviewed in EPIC today.    REVIEW OF SYSTEMS:  4 point ROS including Respiratory, CV, GI and , other than that noted in the HPI,  is negative    Objective:   /64   Pulse 63   Temp 97.7  F (36.5  C)   Resp 17   Ht 1.626 m (5' 4\")   Wt 47.1 kg (103 lb 12.8 oz)   SpO2 96%   BMI 17.82 kg/m      Physical Exam  Vitals (Facility EMR) reviewed.   Constitutional:       General: She is not in acute distress.     Comments: Frail appearing   HENT:      Head: Normocephalic and atraumatic.   Eyes:      General: No scleral icterus.  Cardiovascular:      Rate and Rhythm: Normal rate and regular rhythm.      Heart sounds: No murmur heard.  Pulmonary:      Effort: Pulmonary effort is normal.      Breath sounds: No wheezing.   Musculoskeletal:      Right lower leg: No edema.      Left lower leg: No edema.   Skin:     General: Skin is warm and dry.      Findings: No rash.   Neurological:      Mental Status: She is alert. Mental status is at baseline.   Psychiatric:         Behavior: Behavior normal.          MED REC REQUIRED  Post Medication Reconciliation Status: discharge medications reconciled and changed, per note/orders      Recent labs in [facility] reviewed by me today.  and Most Recent 3 CBC's:  Recent Labs   Lab Test 07/15/24  0654 07/01/24  0617 06/24/24  0655   WBC 6.7 4.9 4.1   HGB 8.3* 8.1* 8.0*   * 105* 99    224 189     Most Recent 3 BMP's:  Recent Labs   Lab Test 07/15/24  0654 07/01/24  0617 06/24/24  0655    136 134*   POTASSIUM 3.8 4.1 3.8   CHLORIDE 103 102 100   CO2 27 26 23   BUN 22.7 14.6 10.3   CR 0.43* 0.45* 0.41*   ANIONGAP 8 8 11   DAVE 9.5 9.4 9.2   GLC 82 89 79     Most Recent 2 LFT's:  Recent Labs   Lab Test 06/21/24  2256 05/28/24  0635   AST 8 10   ALT 7 5   ALKPHOS 93 179*   BILITOTAL 1.2 " 0.6       Assessment/Plan:  Acute encephalopathy, improved  Dementia: Baseline cognitive impairment/dementia. SLUMS 5/2024 at TCU 8/30.  Worsening confusion in the setting of COVID and UTI.  - Reorient as needed  - OT and social work following      C. difficile colitis, resolved: Presented for evaluation of abdominal pain and diarrhea.  CT scan with evidence of colitis and esophagitis.  C. difficile PCR and toxin positive.  GI consulted and recommended p.o. vancomycin with consideration for outpatient FMT  -Recently had [facility] follow up.  Planning to continue suppressive vancomycin. [facility] working on getting FMT covered by insurance. - Continue regular diet    Acute on chronic anemia  History of GI bleed  Esophagitis:   - Continue Protonix twice daily  - Hemoglobin 7/1 stable at 8.3  - Continues on ferrous sulfate  -Monitor as clinically appropriate      Fall  Acute comminuted distal left tibia/fibular fracture status post ORIF 5/3/2024:   - Had follow up with Ortho and now WBAT.  Therapies back involved  -Pain control as discussed below  - PT/OT/social work.  - Continue vitamin D supplementation:    Acute on Chronic pain Back Pain  H/o Vertebral compression Fx T11-L3: Chronic back pain in the setting of previous compression fractures.  - Continue scheduled Tylenol  - Continue as needed Dilaudid.        Hypertension: BP soft during hospital stay.  Amlodipine discontinued and lisinopril reduced to 20-10 mg  - SBPS a bit variable. RN notes occasionally pockets/spits out pills. Ideally would simplify regimen  - Continue off amlodipine  - Continue reduced lisinopril  - Continue baseline Coreg and Isordil a  - Will plan to taper off Clonidine. Coordinated with clinical pharmacist, will adjust to 0.1 mg every other day and monitor.      CKD stage IIIa: Estimated Creatinine Clearance: 60.8 mL/min (A) (based on SCr of 0.43 mg/dL (L)).    -Recent BMP within baseline      MDD  Anxiety  -Continue Zoloft    Malnutrition  Failure  to Thrive: Has had significant weight loss since admission to TCU.  Not unexpected given dementia as well as hospitalization recurrent hospitalizations for C. difficile colitis, COVID and UTI  - Facility nutritionist following. Weights remain down but stable over the past several weeks    Wt Readings from Last 4 Encounters:   07/17/24 47.1 kg (103 lb 12.8 oz)   07/08/24 47 kg (103 lb 9.6 oz)   07/02/24 47 kg (103 lb 9.6 oz)   06/25/24 47 kg (103 lb 9.6 oz)          Orders:  As above      Electronically signed by: Olivia Melara PA-C

## 2024-07-17 NOTE — LETTER
7/17/2024      Daya Ignacio  [address]  [place] [address]          Chief Complaint   Patient presents with     Nursing Home Acute       HPI:  Daya Ignacio is a 89 year old  (10/11/1930), who is being seen today for an episodic care visit at: [facility] ([facility]) [[id number]].     Brief summary: Daya Ignacio is a 89-year-old female with a past medical history of hypertension, chronic back pain, mild cognitive impairment, GERD, previous C. difficile, history of right tib-fib fracture with recent recurrent hospitalizations at [facility].    Initially hospitalized 5/1 - 5/8 after presenting from her group home with a fall. Found to have a distal tib-fib fracture.  Orthopedics consulted underwent surgical ORIF.  Postoperative course complicated by hypoxia with cough.  Chest x-ray negative.  Treated supportively.  Assessed by therapies and discharged to U    Readmitted 5/25 - 5/31 after presenting from U with abdominal pain and diarrhea.  Found to have significant leukocytosis at 28.  CT scan concerning for colitis.  C. difficile PCR and toxin positive.  GI consulted and recommended p.o. vancomycin and taper but may consider FMT given severity.  Hospital course complicated by acute chronic anemia for which she required a transfusion.  Now discharged back to U    Readmitted 6/21 - 6/24/2024 for increasing confusion and refusal to take p.o. meds.  This was in the setting of recently diagnosed COVID-19 for which she had been initiated on Paxlovid at U.  Additionally, found to have E. coli urinary tract infection for which she was treated with antibiotics.  Blood pressure medications were adjusted.  Discharged back to U    Today's concern is: Had Ortho follow up last week. Now WBAT. Progress expectedly slow with therapy given deconditioning. RN notes occasionally pockets/spits out pills. They are crushing the meds that they can    Merle is evaluated in her room. Denies pain  "this am. States she ate breakfast.         Review of nursing home EMR:-153  Wt Readings from Last 4 Encounters:   07/17/24 47.1 kg (103 lb 12.8 oz)   07/08/24 47 kg (103 lb 9.6 oz)   07/02/24 47 kg (103 lb 9.6 oz)   06/25/24 47 kg (103 lb 9.6 oz)        Allergies, and PMH/PSH reviewed in [facility] today.    REVIEW OF SYSTEMS:  4 point ROS including Respiratory, CV, GI and , other than that noted in the HPI,  is negative    Objective:   /64   Pulse 63   Temp 97.7  F (36.5  C)   Resp 17   Ht 1.626 m (5' 4\")   Wt 47.1 kg (103 lb 12.8 oz)   SpO2 96%   BMI 17.82 kg/m      Physical Exam  Vitals (Facility EMR) reviewed.   Constitutional:       General: She is not in acute distress.     Comments: Frail appearing   HENT:      Head: Normocephalic and atraumatic.   Eyes:      General: No scleral icterus.  Cardiovascular:      Rate and Rhythm: Normal rate and regular rhythm.      Heart sounds: No murmur heard.  Pulmonary:      Effort: Pulmonary effort is normal.      Breath sounds: No wheezing.   Musculoskeletal:      Right lower leg: No edema.      Left lower leg: No edema.   Skin:     General: Skin is warm and dry.      Findings: No rash.   Neurological:      Mental Status: She is alert. Mental status is at baseline.   Psychiatric:         Behavior: Behavior normal.          MED REC REQUIRED  Post Medication Reconciliation Status: discharge medications reconciled and changed, per note/orders      Recent labs in [facility] reviewed by me today.  and Most Recent 3 CBC's:  Recent Labs   Lab Test 07/15/24  0654 07/01/24  0617 06/24/24  0655   WBC 6.7 4.9 4.1   HGB 8.3* 8.1* 8.0*   * 105* 99    224 189     Most Recent 3 BMP's:  Recent Labs   Lab Test 07/15/24  0654 07/01/24  0617 06/24/24  0655    136 134*   POTASSIUM 3.8 4.1 3.8   CHLORIDE 103 102 100   CO2 27 26 23   BUN 22.7 14.6 10.3   CR 0.43* 0.45* 0.41*   ANIONGAP 8 8 11   DAVE 9.5 9.4 9.2   GLC 82 89 79     Most Recent 2 LFT's:  Recent Labs "   Lab Test 06/21/24  2256 05/28/24  0635   AST 8 10   ALT 7 5   ALKPHOS 93 179*   BILITOTAL 1.2 0.6       Assessment/Plan:  Acute encephalopathy, improved  Dementia: Baseline cognitive impairment/dementia. SLUMS 5/2024 at U 8/30.  Worsening confusion in the setting of COVID and UTI.  - Reorient as needed  - OT and social work following      C. difficile colitis, resolved: Presented for evaluation of abdominal pain and diarrhea.  CT scan with evidence of colitis and esophagitis.  C. difficile PCR and toxin positive.  GI consulted and recommended p.o. vancomycin with consideration for outpatient FMT  -Recently had [facility] follow up.  Planning to continue suppressive vancomycin. [facility] working on getting FMT covered by insurance. - Continue regular diet    Acute on chronic anemia  History of GI bleed  Esophagitis:   - Continue Protonix twice daily  - Hemoglobin 7/1 stable at 8.3  - Continues on ferrous sulfate  -Monitor as clinically appropriate      Fall  Acute comminuted distal left tibia/fibular fracture status post ORIF 5/3/2024:   - Had follow up with Ortho and now WBAT.  Therapies back involved  -Pain control as discussed below  - PT/OT/social work.  - Continue vitamin D supplementation:    Acute on Chronic pain Back Pain  H/o Vertebral compression Fx T11-L3: Chronic back pain in the setting of previous compression fractures.  - Continue scheduled Tylenol  - Continue as needed Dilaudid.        Hypertension: BP soft during hospital stay.  Amlodipine discontinued and lisinopril reduced to 20-10 mg  - SBPS a bit variable. RN notes occasionally pockets/spits out pills. Ideally would simplify regimen  - Continue off amlodipine  - Continue reduced lisinopril  - Continue baseline Coreg and Isordil a  - Will plan to taper off Clonidine. Coordinated with clinical pharmacist, will adjust to 0.1 mg every other day and monitor.      CKD stage IIIa: Estimated Creatinine Clearance: 60.8 mL/min (A) (based on SCr of 0.43 mg/dL  (L)).    -Recent BMP within baseline      MDD  Anxiety  -Continue Zoloft    Malnutrition  Failure to Thrive: Has had significant weight loss since admission to TCU.  Not unexpected given dementia as well as hospitalization recurrent hospitalizations for C. difficile colitis, COVID and UTI  - Facility nutritionist following. Weights remain down but stable over the past several weeks    Wt Readings from Last 4 Encounters:   07/17/24 47.1 kg (103 lb 12.8 oz)   07/08/24 47 kg (103 lb 9.6 oz)   07/02/24 47 kg (103 lb 9.6 oz)   06/25/24 47 kg (103 lb 9.6 oz)          Orders:  As above      Electronically signed by: Olivia Melara PA-C       Sincerely,        Olivia Melara PA-C

## 2024-07-31 ENCOUNTER — TRANSITIONAL CARE UNIT VISIT (OUTPATIENT)
Dept: GERIATRICS | Facility: CLINIC | Age: 89
End: 2024-07-31
Payer: COMMERCIAL

## 2024-07-31 VITALS
WEIGHT: 103.8 LBS | HEART RATE: 62 BPM | HEIGHT: 64 IN | RESPIRATION RATE: 18 BRPM | TEMPERATURE: 97.2 F | OXYGEN SATURATION: 95 % | SYSTOLIC BLOOD PRESSURE: 129 MMHG | BODY MASS INDEX: 17.72 KG/M2 | DIASTOLIC BLOOD PRESSURE: 60 MMHG

## 2024-07-31 DIAGNOSIS — A04.72 CLOSTRIDIUM DIFFICILE COLITIS: ICD-10-CM

## 2024-07-31 DIAGNOSIS — G89.29 CHRONIC LOW BACK PAIN WITHOUT SCIATICA, UNSPECIFIED BACK PAIN LATERALITY: ICD-10-CM

## 2024-07-31 DIAGNOSIS — I10 BENIGN ESSENTIAL HYPERTENSION: ICD-10-CM

## 2024-07-31 DIAGNOSIS — S82.302D CLOSED FRACTURE OF DISTAL END OF LEFT FIBULA AND TIBIA WITH ROUTINE HEALING, SUBSEQUENT ENCOUNTER: Primary | ICD-10-CM

## 2024-07-31 DIAGNOSIS — M54.50 CHRONIC LOW BACK PAIN WITHOUT SCIATICA, UNSPECIFIED BACK PAIN LATERALITY: ICD-10-CM

## 2024-07-31 DIAGNOSIS — S82.832D CLOSED FRACTURE OF DISTAL END OF LEFT FIBULA AND TIBIA WITH ROUTINE HEALING, SUBSEQUENT ENCOUNTER: Primary | ICD-10-CM

## 2024-07-31 PROCEDURE — 99309 SBSQ NF CARE MODERATE MDM 30: CPT | Performed by: PHYSICIAN ASSISTANT

## 2024-07-31 RX ORDER — HYDROMORPHONE HYDROCHLORIDE 2 MG/1
1 TABLET ORAL EVERY 6 HOURS PRN
Qty: 10 TABLET | Refills: 0 | Status: SHIPPED | OUTPATIENT
Start: 2024-07-31 | End: 2024-08-15

## 2024-07-31 NOTE — LETTER
7/31/2024      Daya Ignacio  [address]  [facility] [address]          Chief Complaint   Patient presents with     Nursing Home Acute       HPI:  Daya Ignacio is a 89 year old  (10/11/1930), who is being seen today for an episodic care visit at: [facility] ([facility]) [[id number]].     Brief summary: Daya Ignacio is a 89-year-old female with a past medical history of hypertension, chronic back pain, mild cognitive impairment, GERD, previous C. difficile, history of right tib-fib fracture with recent recurrent hospitalizations at [facility][note 3].    Initially hospitalized 5/1 - 5/8 after presenting from her [facility] with a fall. Found to have a distal tib-fib fracture.  Orthopedics consulted underwent surgical ORIF.  Postoperative course complicated by hypoxia with cough.  Chest x-ray negative.  Treated supportively.  Assessed by therapies and discharged to U    Readmitted 5/25 - 5/31 after presenting from U with abdominal pain and diarrhea.  Found to have significant leukocytosis at 28.  CT scan concerning for colitis.  C. difficile PCR and toxin positive.  GI consulted and recommended p.o. vancomycin and taper but may consider FMT given severity.  Hospital course complicated by acute chronic anemia for which she required a transfusion.  Now discharged back to U    Readmitted 6/21 - 6/24/2024 for increasing confusion and refusal to take p.o. meds.  This was in the setting of recently diagnosed COVID-19 for which she had been initiated on Paxlovid at U.  Additionally, found to have E. coli urinary tract infection for which she was treated with antibiotics.  Blood pressure medications were adjusted.  Discharged back to U      Today's concern is: Started Vowst (fecal micro spores) per GI due to high risk Cdiff. Now off Vanco. Per RN taking medications without difficulty. Did have some nausea with MgCitrate prep but now resolved    Daya is evaluated resting in bed. Initially  "assumes I am a therapist and indicates she doesn't want \"to exercise\". Introduced self and role. States she doesn't like it at the facility and wants to leave. Does c/o pain in her back. Denies abdominal pain. No Nausea      Review of nursing home EMR:SBP . Weight 103.8,      Allergies, and PMH/PSH reviewed in [facility] today.    REVIEW OF SYSTEMS:  Limited secondary to cognitive impairment but today pt reports No nausea    Objective:   /60   Pulse 62   Temp 97.2  F (36.2  C)   Resp 18   Ht 1.626 m (5' 4\")   Wt 47.1 kg (103 lb 12.8 oz)   SpO2 95%   BMI 17.82 kg/m      Physical Exam  Vitals (Facility EMR) reviewed.   Constitutional:       Comments: Frail appearing   HENT:      Head: Normocephalic and atraumatic.   Eyes:      General: No scleral icterus.  Cardiovascular:      Rate and Rhythm: Normal rate and regular rhythm.   Pulmonary:      Effort: Pulmonary effort is normal.   Musculoskeletal:      Right lower leg: No edema.      Left lower leg: No edema.   Skin:     General: Skin is warm and dry.      Findings: No rash.   Neurological:      Mental Status: She is alert. Mental status is at baseline.   Psychiatric:         Behavior: Behavior normal.          MED REC REQUIRED  Post Medication Reconciliation Status: discharge medications reconciled and changed, per note/orders      Recent labs in [facility] reviewed by me today.  and Most Recent 3 CBC's:  Recent Labs   Lab Test 07/15/24  0654 07/01/24  0617 06/24/24  0655   WBC 6.7 4.9 4.1   HGB 8.3* 8.1* 8.0*   * 105* 99    224 189     Most Recent 3 BMP's:  Recent Labs   Lab Test 07/15/24  0654 07/01/24  0617 06/24/24  0655    136 134*   POTASSIUM 3.8 4.1 3.8   CHLORIDE 103 102 100   CO2 27 26 23   BUN 22.7 14.6 10.3   CR 0.43* 0.45* 0.41*   ANIONGAP 8 8 11   DAVE 9.5 9.4 9.2   GLC 82 89 79       Assessment/Plan:  C. difficile colitis, resolved:  S/p prolonged Vancomycin course. GI was able to get oral fecal mirobio approved  - Now off " vanco  - Continue Vowst per GI instructions  - Continue regular diet      Fall  Acute comminuted distal left tibia/fibular fracture status post ORIF 5/3/2024: Was NWB x 8 weeks, now WBAT as of mid July  - PT following 3x weekly. Participation in limited. Remains fortino lift per nursing  - PT/OT/social work following  - Continue vitamin D supplementation:      Hypertension: Have been tapering BP medications. Amlodipine d/c. Lisinopril reduced  - Continue off amlodipine  - Continue reduced lisinopril  - Working on taper off Clonidine, currently every other day. Will d/c  - Continue baseline Coreg and Isordil     Acute on Chronic pain Back Pain  H/o Vertebral compression Fx T11-L3: Chronic back pain in the setting of previous compression fractures.  - Continue scheduled Tylenol  - Continue as needed Dilaudid.        Dementia: Baseline cognitive impairment/dementia. SLUMS 5/2024 at [facility] 8/30.    - Reorient as needed  - OT and social work following      Acute on chronic anemia  History of GI bleed  Esophagitis:   - Continue Protonix twice daily  - Hemoglobin 7/15 stable at 8.3  - Continues on ferrous sulfate  -Monitor as clinically appropriate      CKD stage IIIa: Estimated Creatinine Clearance: 60.8 mL/min (A) (based on SCr of 0.43 mg/dL (L)).  -Recent BMP within baseline      MDD  Anxiety  -Continue Zoloft    Malnutrition  Failure to Thrive: Has had significant weight loss since admission to TCU.  Not unexpected given dementia as well as hospitalization recurrent hospitalizations for C. difficile colitis, COVID and UTI  - Facility nutritionist following. Weights remain down but stable over the past several weeks    Orders:  As above      Electronically signed by: Olivia Melara PA-C       Sincerely,        Olivia Melara PA-C

## 2024-07-31 NOTE — PROGRESS NOTES
"  Chief Complaint   Patient presents with    Nursing Home Acute       HPI:  Daya Ignacio is a 89 year old  (10/11/1930), who is being seen today for an episodic care visit at: [facility] ([facility]) [[id number]].     Brief summary: Daya Ignacio is a 89-year-old female with a past medical history of hypertension, chronic back pain, mild cognitive impairment, GERD, previous C. difficile, history of right tib-fib fracture with recent recurrent hospitalizations at [facility].    Initially hospitalized 5/1 - 5/8 after presenting from her [facility] with a fall. Found to have a distal tib-fib fracture.  Orthopedics consulted underwent surgical ORIF.  Postoperative course complicated by hypoxia with cough.  Chest x-ray negative.  Treated supportively.  Assessed by therapies and discharged to U    Readmitted 5/25 - 5/31 after presenting from [facility] with abdominal pain and diarrhea.  Found to have significant leukocytosis at 28.  CT scan concerning for colitis.  C. difficile PCR and toxin positive.  GI consulted and recommended p.o. vancomycin and taper but may consider FMT given severity.  Hospital course complicated by acute chronic anemia for which she required a transfusion.  Now discharged back to U    Readmitted 6/21 - 6/24/2024 for increasing confusion and refusal to take p.o. meds.  This was in the setting of recently diagnosed COVID-19 for which she had been initiated on Paxlovid at [facility].  Additionally, found to have E. coli urinary tract infection for which she was treated with antibiotics.  Blood pressure medications were adjusted.  Discharged back to U      Today's concern is: Started Vowst (fecal micro spores) per GI due to high risk Cdiff. Now off Vanco. Per RN taking medications without difficulty. Did have some nausea with MgCitrate prep but now resolved    Daya is evaluated resting in bed. Initially assumes I am a therapist and indicates she doesn't want \"to exercise\". Introduced self and role. " "States she doesn't like it at the facility and wants to leave. Does c/o pain in her back. Denies abdominal pain. No Nausea      Review of nursing home EMR:SBP . Weight 103.8,      Allergies, and PMH/PSH reviewed in [facility] today.    REVIEW OF SYSTEMS:  Limited secondary to cognitive impairment but today pt reports No nausea    Objective:   /60   Pulse 62   Temp 97.2  F (36.2  C)   Resp 18   Ht 1.626 m (5' 4\")   Wt 47.1 kg (103 lb 12.8 oz)   SpO2 95%   BMI 17.82 kg/m      Physical Exam  Vitals (Facility EMR) reviewed.   Constitutional:       Comments: Frail appearing   HENT:      Head: Normocephalic and atraumatic.   Eyes:      General: No scleral icterus.  Cardiovascular:      Rate and Rhythm: Normal rate and regular rhythm.   Pulmonary:      Effort: Pulmonary effort is normal.   Musculoskeletal:      Right lower leg: No edema.      Left lower leg: No edema.   Skin:     General: Skin is warm and dry.      Findings: No rash.   Neurological:      Mental Status: She is alert. Mental status is at baseline.   Psychiatric:         Behavior: Behavior normal.          MED REC REQUIRED  Post Medication Reconciliation Status: discharge medications reconciled and changed, per note/orders      Recent labs in [facility] reviewed by me today.  and Most Recent 3 CBC's:  Recent Labs   Lab Test 07/15/24  0654 07/01/24  0617 06/24/24  0655   WBC 6.7 4.9 4.1   HGB 8.3* 8.1* 8.0*   * 105* 99    224 189     Most Recent 3 BMP's:  Recent Labs   Lab Test 07/15/24  0654 07/01/24  0617 06/24/24  0655    136 134*   POTASSIUM 3.8 4.1 3.8   CHLORIDE 103 102 100   CO2 27 26 23   BUN 22.7 14.6 10.3   CR 0.43* 0.45* 0.41*   ANIONGAP 8 8 11   DAVE 9.5 9.4 9.2   GLC 82 89 79       Assessment/Plan:  C. difficile colitis, resolved:  S/p prolonged Vancomycin course. GI was able to get oral fecal mirobio approved  - Now off vanco  - Continue Vowst per GI instructions  - Continue regular diet      Fall  Acute comminuted " distal left tibia/fibular fracture status post ORIF 5/3/2024: Was NWB x 8 weeks, now WBAT as of mid July  - PT following 3x weekly. Participation in limited. Remains fortino lift per nursing  - PT/OT/social work following  - Continue vitamin D supplementation:      Hypertension: Have been tapering BP medications. Amlodipine d/c. Lisinopril reduced  - Continue off amlodipine  - Continue reduced lisinopril  - Working on taper off Clonidine, currently every other day. Will d/c  - Continue baseline Coreg and Isordil     Acute on Chronic pain Back Pain  H/o Vertebral compression Fx T11-L3: Chronic back pain in the setting of previous compression fractures.  - Continue scheduled Tylenol  - Continue as needed Dilaudid.        Dementia: Baseline cognitive impairment/dementia. SLUMS 5/2024 at [facility] 8/30.    - Reorient as needed  - OT and social work following      Acute on chronic anemia  History of GI bleed  Esophagitis:   - Continue Protonix twice daily  - Hemoglobin 7/15 stable at 8.3  - Continues on ferrous sulfate  -Monitor as clinically appropriate      CKD stage IIIa: Estimated Creatinine Clearance: 60.8 mL/min (A) (based on SCr of 0.43 mg/dL (L)).  -Recent BMP within baseline      MDD  Anxiety  -Continue Zoloft    Malnutrition  Failure to Thrive: Has had significant weight loss since admission to TCU.  Not unexpected given dementia as well as hospitalization recurrent hospitalizations for C. difficile colitis, COVID and UTI  - Facility nutritionist following. Weights remain down but stable over the past several weeks    Orders:  As above      Electronically signed by: Olivia Melara PA-C

## 2024-08-07 ENCOUNTER — LAB REQUISITION (OUTPATIENT)
Dept: LAB | Facility: CLINIC | Age: 89
End: 2024-08-07
Payer: COMMERCIAL

## 2024-08-07 ENCOUNTER — NURSING HOME VISIT (OUTPATIENT)
Dept: GERIATRICS | Facility: CLINIC | Age: 89
End: 2024-08-07
Payer: COMMERCIAL

## 2024-08-07 VITALS
WEIGHT: 103.8 LBS | OXYGEN SATURATION: 96 % | DIASTOLIC BLOOD PRESSURE: 63 MMHG | RESPIRATION RATE: 18 BRPM | BODY MASS INDEX: 17.72 KG/M2 | TEMPERATURE: 97.2 F | SYSTOLIC BLOOD PRESSURE: 131 MMHG | HEART RATE: 70 BPM | HEIGHT: 64 IN

## 2024-08-07 DIAGNOSIS — I10 ESSENTIAL (PRIMARY) HYPERTENSION: ICD-10-CM

## 2024-08-07 DIAGNOSIS — D64.9 ANEMIA, UNSPECIFIED: ICD-10-CM

## 2024-08-07 DIAGNOSIS — U07.1 COVID-19: ICD-10-CM

## 2024-08-07 PROCEDURE — 99310 SBSQ NF CARE HIGH MDM 45: CPT | Performed by: PHYSICIAN ASSISTANT

## 2024-08-07 RX ORDER — LISINOPRIL 20 MG/1
20 TABLET ORAL DAILY
Status: SHIPPED | DISCHARGE
Start: 2024-08-07 | End: 2024-08-14

## 2024-08-07 NOTE — LETTER
" 8/7/2024      Daya Ignacio  [address]  [facility] [address]        [facility]    Chief Complaint   Patient presents with    Nursing Home Acute     HPI:  Daya Ignacio is a 89 year old  (10/11/1930), who is being seen today for an episodic care visit at: [facility] ([facility]) [[id number]]. Today's concern is: ***    Allergies, and PMH/PSH reviewed in [facility] today.  REVIEW OF SYSTEMS:  {mxfzlk19:151624}    Objective:   /63   Pulse 70   Temp 97.2  F (36.2  C)   Resp 18   Ht 1.626 m (5' 4\")   Wt 47.1 kg (103 lb 12.8 oz)   SpO2 96%   BMI 17.82 kg/m    {Nursing home physical exam :687915}    {fgslab:624317}    Assessment/Plan:  {FGS DX2:334737}    MED REC REQUIRED{TIP  Click the link below to document or use med rec list, use list to pull in response :754333}  Post Medication Reconciliation Status: {MED REC LIST:319319}      Orders:  {fgsorders:392377}  ***    Electronically signed by: Eliza Mena MA ***          Sincerely,        Olivia Melara PA-C      "

## 2024-08-07 NOTE — LETTER
8/7/2024      Daya Ignacio  [address]  [facility] [address]        [facility]  Chief Complaint   Patient presents with     long term Regulatory     [facility] Medical Record Number:  [id number]  Place of Service where encounter took place:  [facility] ([facility]) [[id number]]    HPI:    Daya Ignacio  is 89 year old (10/11/1930), who is being seen today for a federally mandated E/M visit. PMH hypertension, chronic back pain, cognitive impairment, Cdiff s/p FMT with VOWST 7/2024, h/o left distal tib-fib fracture status post ORIF 5/2024 admitted to LTC August 2024 after prolonged TCU stay following tib-fib fracture.       Today's concerns are: Daya has now moved to LTC on third floor.  Therapy continues to follow with 3 times weekly as long as participation at allows.  Overall goal continues to be to get her back to her [facility].  Therapy notes reviewed and participation is up-and-down.  Has had some orthostatic BP changes and complaints of feeling weak.    Daya is evaluated in her room.  Confused regarding current situation.  Unaware that she moved from TCU downstairs. Thinks she is at new facility.  Denies abdominal pain.  No nausea.  Does not want to talk about how therapy is going      Notably separately, spoke with son Gael via phone.  Reviewed [facility] geriatric role.  Discussed we will continue to follow while she remains in LTC.  Reviewed recent medication adjustments and plans moving forward to limit orthostatic BP changes.  He does feel as though the past couple of days he has seen an improvement in his mom's ability to participate in therapy.  He hopes he can continue with this trend.  Goal is to continue to work towards discharge back to her retirement.  She does need to be able to transfer without the use of a lift in order to discharge to her previous setting    Review of nursing home EMR: SBP . Weight 103.8        ALLERGIES:Atorvastatin,  Nitrofurantoin, Penicillins, Sulfa antibiotics, and Sulfasalazine  PAST MEDICAL HISTORY:   Past Medical History:   Diagnosis Date     Anemia      Anxiety      Arthritis      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 (H28) 04/16/2014     Chronic low back pain      Depression      h/o Clostridium difficile colitis 06/2019     h/o Mumps      Hypertension      Kidney stone      Lumbar compression fracture      Macular degeneration (senile) of retina, unspecified      Mild cognitive impairment      NSTEMI (non-ST elevated myocardial infarction) (H) 05/28/2017     Recurrent UTI      Seasonal allergic rhinitis      Upper GI bleed 06/2019     Urinary tract infection due to extended-spectrum beta lactamase (ESBL)-producing Klebsiella 05/2019    resistant to Bactrim     PAST SURGICAL HISTORY:   has a past surgical history that includes Esophagoscopy, gastroscopy, duodenoscopy (EGD), combined (N/A, 06/11/2019); Cystoscopy (N/A, 08/01/2019); Tonsillectomy, adenoidectomy, combined; Cataract removal NOS (Bilateral); Open reduction internal fixation tibial plateau (Right, 08/28/2019); Combined Cystoscopy, Insert Stent Ureter(s) (Left, 05/06/2020); Extracorporeal shock wave lithotripsy (ESWL) (Left, 05/28/2020); Laser holmium lithotripsy ureter(s), insert stent, combined (Left, 10/30/2020); Open reduction internal fixation ankle (Left, 05/03/2024); Open reduction internal fixation rodding intramedullary tibia (Left, 05/03/2024); Open reduction internal fixation hip (Left); and Arthroplasty hip (Right).  FAMILY HISTORY: family history includes Alzheimer Disease in her mother; Cardiovascular in her father; Neurologic Disorder (age of onset: 83) in her brother.  SOCIAL HISTORY:  reports that she has quit smoking. Her smoking use included cigarettes. She has never used smokeless tobacco. She reports that she does not drink alcohol and does not use drugs.    MEDICATIONS:  MED REC REQUIRED  Post Medication Reconciliation Status:  discharge medications reconciled and changed, per note/orders         Review of your medicines            Accurate as of August 7, 2024  3:01 PM. If you have any questions, ask your nurse or doctor.                CONTINUE these medicines which may have CHANGED, or have new prescriptions. If we are uncertain of the size of tablets/capsules you have at home, strength may be listed as something that might have changed.        Dose / Directions   lisinopril 20 MG tablet  Commonly known as: ZESTRIL  This may have changed:   medication strength  how much to take  Used for: COVID-19  Changed by: Olivia Melara      Dose: 20 mg  Take 1 tablet (20 mg) by mouth daily  Refills: 0            CONTINUE these medicines which have NOT CHANGED        Dose / Directions   acetaminophen 500 MG tablet  Commonly known as: TYLENOL  Indication: Fever, Pain      Dose: 1,000 mg  Take 1,000 mg by mouth 3 times daily  Refills: 0     carvedilol 12.5 MG tablet  Commonly known as: COREG  Indication: High Blood Pressure Disorder      Dose: 12.5 mg  Take 12.5 mg by mouth 2 times daily (with meals)  Refills: 0     Ferrous Gluconate 324 (37.5 Fe) MG Tabs  Indication: Iron Deficiency      Dose: 1 tablet  Take 1 tablet by mouth three times a week on MWF  Refills: 0     HYDROmorphone 2 MG tablet  Commonly known as: DILAUDID  Indication: Pain      Dose: 1 mg  Take 0.5 tablets (1 mg) by mouth every 6 hours as needed for moderate pain  Quantity: 10 tablet  Refills: 0     melatonin 3 MG tablet      Dose: 3 mg  Take 3 mg by mouth nightly as needed for sleep  Refills: 0     nystatin 527750 UNIT/GM external cream  Commonly known as: MYCOSTATIN  Indication: Skin Infection due to Candida Yeast      Apply topically 2 times daily as needed (rash)  Refills: 0     ondansetron 4 MG tablet  Commonly known as: ZOFRAN  Indication: Nausea and Vomiting      Dose: 4 mg  Take 4 mg by mouth every 8 hours as needed for nausea  Refills: 0     polyethylene glycol 17 g  "packet  Commonly known as: MIRALAX  Indication: Constipation  Used for: Tibia/fibula fracture, right, closed, initial encounter      Dose: 17 g  Take 17 g by mouth daily as needed for constipation  Refills: 0     PreserVision AREDS Caps  Indication: Nutritional supplement      Dose: 1 capsule  Take 1 capsule by mouth 2 times daily  Refills: 0     SENNA-docusate sodium 8.6-50 MG tablet  Commonly known as: SENNA S  Used for: Drug-induced constipation      Dose: 1 tablet  Take 1 tablet by mouth at bedtime  Refills: 0     sertraline 100 MG tablet  Commonly known as: ZOLOFT  Indication: Generalized Anxiety Disorder      Dose: 100 mg  Take 100 mg by mouth daily  Refills: 0     vitamin D3 50 MCG (2000 UT) Caps  Indication: Vitamin D Deficiency      Dose: 2,000 Units  Take 2,000 Units by mouth daily  Refills: 0            STOP taking      isosorbide dinitrate 10 MG tablet  Commonly known as: ISORDIL  Stopped by: Olivia Melara                  Where to get your medicines        Information about where to get these medications is not yet available    Ask your nurse or doctor about these medications  lisinopril 20 MG tablet          Case Management:  I have reviewed the care plan and MDS and do agree with the plan. Patient's desire to return to the community is present, but is not able due to care needs . Information reviewed:  Medications, vital signs, orders, and nursing notes.    ROS:  Limited secondary to cognitive impairment but today pt reports no abdominal pain    Vitals:  /63   Pulse 70   Temp 97.2  F (36.2  C)   Resp 18   Ht 1.626 m (5' 4\")   Wt 47.1 kg (103 lb 12.8 oz)   SpO2 96%   BMI 17.82 kg/m    Body mass index is 17.82 kg/m .  Exam:  Physical Exam  Vitals (Facility EMR) reviewed.   Constitutional:       General: She is not in acute distress.  HENT:      Head: Normocephalic and atraumatic.   Eyes:      General: No scleral icterus.  Cardiovascular:      Rate and Rhythm: Normal rate and regular " rhythm.      Heart sounds: No murmur heard.  Pulmonary:      Effort: Pulmonary effort is normal.      Breath sounds: No wheezing.   Musculoskeletal:      Right lower leg: No edema.      Left lower leg: No edema.   Skin:     General: Skin is warm and dry.      Findings: No rash.   Neurological:      Mental Status: She is alert. Mental status is at baseline.   Psychiatric:         Behavior: Behavior normal.           Lab/Diagnostic data:   Recent labs in [facility] reviewed by me today.  and Most Recent 3 CBC's:  Recent Labs   Lab Test 07/15/24  0654 07/01/24  0617 06/24/24  0655   WBC 6.7 4.9 4.1   HGB 8.3* 8.1* 8.0*   * 105* 99    224 189     Most Recent 3 BMP's:  Recent Labs   Lab Test 07/15/24  0654 07/01/24  0617 06/24/24  0655    136 134*   POTASSIUM 3.8 4.1 3.8   CHLORIDE 103 102 100   CO2 27 26 23   BUN 22.7 14.6 10.3   CR 0.43* 0.45* 0.41*   ANIONGAP 8 8 11   DAVE 9.5 9.4 9.2   GLC 82 89 79     Most Recent 2 LFT's:  Recent Labs   Lab Test 06/21/24  2256 05/28/24  0635   AST 8 10   ALT 7 5   ALKPHOS 93 179*   BILITOTAL 1.2 0.6       ASSESSMENT/PLAN    Hypertension: Have been tapering BP medications. Amlodipine and Clonidine d/c and Lisinopril reduced.  - BP variable. PT notes indicated some orthostatic drops with therapy.   - Continue off amlodipine and Clonidine  - Appears isordil started in 2017 after NSTEMI. No complaints of chest pain. Will trial d/c to see if this limits orthostatic BP dops  - Increase lisinopril 15-20 mg/d.  - BMP 8/12  - Continue Coreg      C. difficile colitis, resolved:  S/p prolonged Vancomycin cours as well as Vowst FMT  - Monitor  - Regular diet    Fall  Acute comminuted distal left tibia/fibular fracture status post ORIF 5/3/2024: Was NWB x 8 weeks, now WBAT as of mid July  - PT following 3x weekly. Participation in limited. Remains fortino lift per nursing  - Continue scheduled tylenol and prn dilaudid  - Continue vitamin D supplementation:    Acute on Chronic pain  Back Pain  H/o Vertebral compression Fx T11-L3: Chronic back pain in the setting of previous compression fractures.  - Continue pain control as above      Dementia: Baseline cognitive impairment/dementia. SLUMS 5/2024 at TCU 8/30.    - Reorient as needed. May have some increased confusion with room change    Acute on chronic anemia  History of GI bleed  Esophagitis:   - Continue Protonix twice daily  - Continues on ferrous sulfate  -CBC 8/12      CKD stage IIIa: Estimated Creatinine Clearance: 60.8 mL/min (A) (based on SCr of 0.43 mg/dL (L)).  -BMP 8/12      MDD  Anxiety  -Continue Zoloft    Malnutrition  Failure to Thrive: Has had significant weight loss since admission to TCU.  Not unexpected given dementia as well as hospitalization recurrent hospitalizations for C. difficile colitis, COVID and UTI  - Facility nutritionist following. Weights remain down but stable over the past several weeks      Electronically signed by:  Olivia Melara PA-C      This note was completed in part using Dragon voice recognition software. Although reviewed after completion, some word and grammatical errors may occur.      A total 50 min were spent on this regulatory visit including review of facility EMR including PT notes, coordinating with physical therapist, evaluating patient and discussing plan of care, writing orders, separate phone call to patient's son and documenting.  All services performed on day of visit           Sincerely,        Olivia Melara PA-C

## 2024-08-07 NOTE — PROGRESS NOTES
[facility] GERIATRICS  Chief Complaint   Patient presents with    retirement Regulatory     [facility] Medical Record Number:  [id number]  Place of Service where encounter took place:  [facility] ([facility]) [[id number]]    HPI:    Daya Ignacio  is 89 year old (10/11/1930), who is being seen today for a federally mandated E/M visit. PMH hypertension, chronic back pain, cognitive impairment, Cdiff s/p FMT with VOWST 7/2024, h/o left distal tib-fib fracture status post ORIF 5/2024 admitted to LTC August 2024 after prolonged TCU stay following tib-fib fracture.       Today's concerns are: Daya has now moved to LTC on third floor.  Therapy continues to follow with 3 times weekly as long as participation at allows.  Overall goal continues to be to get her back to her skilled nursing.  Therapy notes reviewed and participation is up-and-down.  Has had some orthostatic BP changes and complaints of feeling weak.    Daya is evaluated in her room.  Confused regarding current situation.  Unaware that she moved from TCU downstairs. Thinks she is at new facility.  Denies abdominal pain.  No nausea.  Does not want to talk about how therapy is going      Notably separately, spoke with son Gael via phone.  Reviewed [facility] geriatric role.  Discussed we will continue to follow while she remains in LTC.  Reviewed recent medication adjustments and plans moving forward to limit orthostatic BP changes.  He does feel as though the past couple of days he has seen an improvement in his mom's ability to participate in therapy.  He hopes he can continue with this trend.  Goal is to continue to work towards discharge back to her [facility].  She does need to be able to transfer without the use of a lift in order to discharge to her previous setting    Review of nursing home EMR: SBP . Weight 103.8        ALLERGIES:Atorvastatin, Nitrofurantoin, Penicillins, Sulfa antibiotics, and Sulfasalazine  PAST MEDICAL HISTORY:   Past  Medical History:   Diagnosis Date    Anemia     Anxiety     Arthritis     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 (H28) 04/16/2014    Chronic low back pain     Depression     h/o Clostridium difficile colitis 06/2019    h/o Mumps     Hypertension     Kidney stone     Lumbar compression fracture     Macular degeneration (senile) of retina, unspecified     Mild cognitive impairment     NSTEMI (non-ST elevated myocardial infarction) (H) 05/28/2017    Recurrent UTI     Seasonal allergic rhinitis     Upper GI bleed 06/2019    Urinary tract infection due to extended-spectrum beta lactamase (ESBL)-producing Klebsiella 05/2019    resistant to Bactrim     PAST SURGICAL HISTORY:   has a past surgical history that includes Esophagoscopy, gastroscopy, duodenoscopy (EGD), combined (N/A, 06/11/2019); Cystoscopy (N/A, 08/01/2019); Tonsillectomy, adenoidectomy, combined; Cataract removal NOS (Bilateral); Open reduction internal fixation tibial plateau (Right, 08/28/2019); Combined Cystoscopy, Insert Stent Ureter(s) (Left, 05/06/2020); Extracorporeal shock wave lithotripsy (ESWL) (Left, 05/28/2020); Laser holmium lithotripsy ureter(s), insert stent, combined (Left, 10/30/2020); Open reduction internal fixation ankle (Left, 05/03/2024); Open reduction internal fixation rodding intramedullary tibia (Left, 05/03/2024); Open reduction internal fixation hip (Left); and Arthroplasty hip (Right).  FAMILY HISTORY: family history includes Alzheimer Disease in her mother; Cardiovascular in her father; Neurologic Disorder (age of onset: 83) in her brother.  SOCIAL HISTORY:  reports that she has quit smoking. Her smoking use included cigarettes. She has never used smokeless tobacco. She reports that she does not drink alcohol and does not use drugs.    MEDICATIONS:  MED REC REQUIRED  Post Medication Reconciliation Status: discharge medications reconciled and changed, per note/orders         Review of your medicines            Accurate as of  August 7, 2024  3:01 PM. If you have any questions, ask your nurse or doctor.                CONTINUE these medicines which may have CHANGED, or have new prescriptions. If we are uncertain of the size of tablets/capsules you have at home, strength may be listed as something that might have changed.        Dose / Directions   lisinopril 20 MG tablet  Commonly known as: ZESTRIL  This may have changed:   medication strength  how much to take  Used for: COVID-19  Changed by: Olivia Melara      Dose: 20 mg  Take 1 tablet (20 mg) by mouth daily  Refills: 0            CONTINUE these medicines which have NOT CHANGED        Dose / Directions   acetaminophen 500 MG tablet  Commonly known as: TYLENOL  Indication: Fever, Pain      Dose: 1,000 mg  Take 1,000 mg by mouth 3 times daily  Refills: 0     carvedilol 12.5 MG tablet  Commonly known as: COREG  Indication: High Blood Pressure Disorder      Dose: 12.5 mg  Take 12.5 mg by mouth 2 times daily (with meals)  Refills: 0     Ferrous Gluconate 324 (37.5 Fe) MG Tabs  Indication: Iron Deficiency      Dose: 1 tablet  Take 1 tablet by mouth three times a week on MWF  Refills: 0     HYDROmorphone 2 MG tablet  Commonly known as: DILAUDID  Indication: Pain      Dose: 1 mg  Take 0.5 tablets (1 mg) by mouth every 6 hours as needed for moderate pain  Quantity: 10 tablet  Refills: 0     melatonin 3 MG tablet      Dose: 3 mg  Take 3 mg by mouth nightly as needed for sleep  Refills: 0     nystatin 247489 UNIT/GM external cream  Commonly known as: MYCOSTATIN  Indication: Skin Infection due to Candida Yeast      Apply topically 2 times daily as needed (rash)  Refills: 0     ondansetron 4 MG tablet  Commonly known as: ZOFRAN  Indication: Nausea and Vomiting      Dose: 4 mg  Take 4 mg by mouth every 8 hours as needed for nausea  Refills: 0     polyethylene glycol 17 g packet  Commonly known as: MIRALAX  Indication: Constipation  Used for: Tibia/fibula fracture, right, closed, initial  "encounter      Dose: 17 g  Take 17 g by mouth daily as needed for constipation  Refills: 0     PreserVision AREDS Caps  Indication: Nutritional supplement      Dose: 1 capsule  Take 1 capsule by mouth 2 times daily  Refills: 0     SENNA-docusate sodium 8.6-50 MG tablet  Commonly known as: SENNA S  Used for: Drug-induced constipation      Dose: 1 tablet  Take 1 tablet by mouth at bedtime  Refills: 0     sertraline 100 MG tablet  Commonly known as: ZOLOFT  Indication: Generalized Anxiety Disorder      Dose: 100 mg  Take 100 mg by mouth daily  Refills: 0     vitamin D3 50 MCG (2000 UT) Caps  Indication: Vitamin D Deficiency      Dose: 2,000 Units  Take 2,000 Units by mouth daily  Refills: 0            STOP taking      isosorbide dinitrate 10 MG tablet  Commonly known as: ISORDIL  Stopped by: Olivia Melara                  Where to get your medicines        Information about where to get these medications is not yet available    Ask your nurse or doctor about these medications  lisinopril 20 MG tablet          Case Management:  I have reviewed the care plan and MDS and do agree with the plan. Patient's desire to return to the community is present, but is not able due to care needs . Information reviewed:  Medications, vital signs, orders, and nursing notes.    ROS:  Limited secondary to cognitive impairment but today pt reports no abdominal pain    Vitals:  /63   Pulse 70   Temp 97.2  F (36.2  C)   Resp 18   Ht 1.626 m (5' 4\")   Wt 47.1 kg (103 lb 12.8 oz)   SpO2 96%   BMI 17.82 kg/m    Body mass index is 17.82 kg/m .  Exam:  Physical Exam  Vitals (Facility EMR) reviewed.   Constitutional:       General: She is not in acute distress.  HENT:      Head: Normocephalic and atraumatic.   Eyes:      General: No scleral icterus.  Cardiovascular:      Rate and Rhythm: Normal rate and regular rhythm.      Heart sounds: No murmur heard.  Pulmonary:      Effort: Pulmonary effort is normal.      Breath sounds: No " wheezing.   Musculoskeletal:      Right lower leg: No edema.      Left lower leg: No edema.   Skin:     General: Skin is warm and dry.      Findings: No rash.   Neurological:      Mental Status: She is alert. Mental status is at baseline.   Psychiatric:         Behavior: Behavior normal.           Lab/Diagnostic data:   Recent labs in [facility] reviewed by me today.  and Most Recent 3 CBC's:  Recent Labs   Lab Test 07/15/24  0654 07/01/24  0617 06/24/24  0655   WBC 6.7 4.9 4.1   HGB 8.3* 8.1* 8.0*   * 105* 99    224 189     Most Recent 3 BMP's:  Recent Labs   Lab Test 07/15/24  0654 07/01/24  0617 06/24/24  0655    136 134*   POTASSIUM 3.8 4.1 3.8   CHLORIDE 103 102 100   CO2 27 26 23   BUN 22.7 14.6 10.3   CR 0.43* 0.45* 0.41*   ANIONGAP 8 8 11   DAVE 9.5 9.4 9.2   GLC 82 89 79     Most Recent 2 LFT's:  Recent Labs   Lab Test 06/21/24  2256 05/28/24  0635   AST 8 10   ALT 7 5   ALKPHOS 93 179*   BILITOTAL 1.2 0.6       ASSESSMENT/PLAN    Hypertension: Have been tapering BP medications. Amlodipine and Clonidine d/c and Lisinopril reduced.  - BP variable. PT notes indicated some orthostatic drops with therapy.   - Continue off amlodipine and Clonidine  - Appears isordil started in 2017 after NSTEMI. No complaints of chest pain. Will trial d/c to see if this limits orthostatic BP dops  - Increase lisinopril 15-20 mg/d.  - BMP 8/12  - Continue Coreg      C. difficile colitis, resolved:  S/p prolonged Vancomycin cours as well as Vowst FMT  - Monitor  - Regular diet    Fall  Acute comminuted distal left tibia/fibular fracture status post ORIF 5/3/2024: Was NWB x 8 weeks, now WBAT as of mid July  - PT following 3x weekly. Participation in limited. Remains fortino lift per nursing  - Continue scheduled tylenol and prn dilaudid  - Continue vitamin D supplementation:    Acute on Chronic pain Back Pain  H/o Vertebral compression Fx T11-L3: Chronic back pain in the setting of previous compression fractures.  -  Continue pain control as above      Dementia: Baseline cognitive impairment/dementia. SLUMS 5/2024 at TCU 8/30.    - Reorient as needed. May have some increased confusion with room change    Acute on chronic anemia  History of GI bleed  Esophagitis:   - Continue Protonix twice daily  - Continues on ferrous sulfate  -CBC 8/12      CKD stage IIIa: Estimated Creatinine Clearance: 60.8 mL/min (A) (based on SCr of 0.43 mg/dL (L)).  -BMP 8/12      MDD  Anxiety  -Continue Zoloft    Malnutrition  Failure to Thrive: Has had significant weight loss since admission to TCU.  Not unexpected given dementia as well as hospitalization recurrent hospitalizations for C. difficile colitis, COVID and UTI  - Facility nutritionist following. Weights remain down but stable over the past several weeks      Electronically signed by:  Olivia Melara PA-C      This note was completed in part using Dragon voice recognition software. Although reviewed after completion, some word and grammatical errors may occur.      A total 50 min were spent on this regulatory visit including review of facility EMR including PT notes, coordinating with physical therapist, evaluating patient and discussing plan of care, writing orders, separate phone call to patient's son and documenting.  All services performed on day of visit

## 2024-08-12 LAB
ANION GAP SERPL CALCULATED.3IONS-SCNC: 9 MMOL/L (ref 7–15)
BASOPHILS # BLD AUTO: 0 10E3/UL (ref 0–0.2)
BASOPHILS NFR BLD AUTO: 0 %
BUN SERPL-MCNC: 30.2 MG/DL (ref 8–23)
CALCIUM SERPL-MCNC: 10 MG/DL (ref 8.8–10.4)
CHLORIDE SERPL-SCNC: 105 MMOL/L (ref 98–107)
CREAT SERPL-MCNC: 0.44 MG/DL (ref 0.51–0.95)
EGFRCR SERPLBLD CKD-EPI 2021: 90 ML/MIN/1.73M2
EOSINOPHIL # BLD AUTO: 0.2 10E3/UL (ref 0–0.7)
EOSINOPHIL NFR BLD AUTO: 3 %
ERYTHROCYTE [DISTWIDTH] IN BLOOD BY AUTOMATED COUNT: 15.5 % (ref 10–15)
GLUCOSE SERPL-MCNC: 86 MG/DL (ref 70–99)
HCO3 SERPL-SCNC: 26 MMOL/L (ref 22–29)
HCT VFR BLD AUTO: 27 % (ref 35–47)
HGB BLD-MCNC: 8.5 G/DL (ref 11.7–15.7)
IMM GRANULOCYTES # BLD: 0.1 10E3/UL
IMM GRANULOCYTES NFR BLD: 1 %
LYMPHOCYTES # BLD AUTO: 1.6 10E3/UL (ref 0.8–5.3)
LYMPHOCYTES NFR BLD AUTO: 27 %
MCH RBC QN AUTO: 35 PG (ref 26.5–33)
MCHC RBC AUTO-ENTMCNC: 31.5 G/DL (ref 31.5–36.5)
MCV RBC AUTO: 111 FL (ref 78–100)
MONOCYTES # BLD AUTO: 0.6 10E3/UL (ref 0–1.3)
MONOCYTES NFR BLD AUTO: 11 %
NEUTROPHILS # BLD AUTO: 3.4 10E3/UL (ref 1.6–8.3)
NEUTROPHILS NFR BLD AUTO: 58 %
NRBC # BLD AUTO: 0 10E3/UL
NRBC BLD AUTO-RTO: 0 /100
PLATELET # BLD AUTO: 215 10E3/UL (ref 150–450)
POTASSIUM SERPL-SCNC: 3.9 MMOL/L (ref 3.4–5.3)
RBC # BLD AUTO: 2.43 10E6/UL (ref 3.8–5.2)
SODIUM SERPL-SCNC: 140 MMOL/L (ref 135–145)
WBC # BLD AUTO: 5.8 10E3/UL (ref 4–11)

## 2024-08-12 PROCEDURE — P9604 ONE-WAY ALLOW PRORATED TRIP: HCPCS | Mod: ORL | Performed by: PHYSICIAN ASSISTANT

## 2024-08-12 PROCEDURE — 80048 BASIC METABOLIC PNL TOTAL CA: CPT | Mod: ORL | Performed by: PHYSICIAN ASSISTANT

## 2024-08-12 PROCEDURE — 85025 COMPLETE CBC W/AUTO DIFF WBC: CPT | Mod: ORL | Performed by: PHYSICIAN ASSISTANT

## 2024-08-12 PROCEDURE — 36415 COLL VENOUS BLD VENIPUNCTURE: CPT | Mod: ORL | Performed by: PHYSICIAN ASSISTANT

## 2024-08-14 ENCOUNTER — NURSING HOME VISIT (OUTPATIENT)
Dept: GERIATRICS | Facility: CLINIC | Age: 89
End: 2024-08-14
Payer: COMMERCIAL

## 2024-08-14 VITALS
DIASTOLIC BLOOD PRESSURE: 59 MMHG | RESPIRATION RATE: 17 BRPM | TEMPERATURE: 97.7 F | SYSTOLIC BLOOD PRESSURE: 109 MMHG | BODY MASS INDEX: 17.72 KG/M2 | HEART RATE: 71 BPM | WEIGHT: 103.8 LBS | HEIGHT: 64 IN | OXYGEN SATURATION: 96 %

## 2024-08-14 DIAGNOSIS — E53.8 VITAMIN B12 DEFICIENCY (NON ANEMIC): ICD-10-CM

## 2024-08-14 DIAGNOSIS — G89.29 CHRONIC BACK PAIN, UNSPECIFIED BACK LOCATION, UNSPECIFIED BACK PAIN LATERALITY: Primary | ICD-10-CM

## 2024-08-14 DIAGNOSIS — K59.03 DRUG-INDUCED CONSTIPATION: ICD-10-CM

## 2024-08-14 DIAGNOSIS — M54.9 CHRONIC BACK PAIN, UNSPECIFIED BACK LOCATION, UNSPECIFIED BACK PAIN LATERALITY: Primary | ICD-10-CM

## 2024-08-14 PROCEDURE — 99310 SBSQ NF CARE HIGH MDM 45: CPT | Performed by: PHYSICIAN ASSISTANT

## 2024-08-14 RX ORDER — UREA 10 %
500 LOTION (ML) TOPICAL DAILY
Status: SHIPPED
Start: 2024-08-14

## 2024-08-14 NOTE — LETTER
" 8/14/2024      Daya Ignacio  [address]  [place] [place] [address]        [facility] GERIATRICS    Chief Complaint   Patient presents with     Nursing Home Acute     HPI:  Daya Ignacio is a 89 year old  (10/11/1930), who is being seen today for an episodic care visit at: [facility] ([facility]) [[id number]].     Today's concern is:  Given patient's dementia with slums of 8/30 collaborative history from staff and family are necessary      Per PT. Had a good day in therapy last week but recently has been declining. C/o pain and making statements like just let me die    Per RN. Refused meds over the weekend. Now has been taking tylenol and dilaudid but declining others. Pain c/o in back and abdomen but sometimes says \"all over\"    Daya is evaluated in her room. Crying, asking for help to use the bathroom. Discussed with aide. Often c/o needing to have BM but goes daily to every other day and stools are soft. Daya does c/o pain today. In her back. Can't really elaborate.     Spent time reviewing patient's chart.  Previously followed with palliative care and neurosurgery approximately 18 months ago.  Has been on regimens of oxycodone, Dilaudid and Norco.  Additionally, appears she was on gabapentin in the past though not on recent admission.  Previous MRI 5/21 with several compression fractures as well as mild-severe spinal stenosis at T12-L1 and moderate spinal canal stenosis at L2-L3.    Separately, spoke with son Gael via phone.  He is quite concerned with regard to the amount of pain he sees his mom and will she is attempting to participate with therapy.  He is leaving for an overseas trip for 2 weeks and is concerned about her pain being managed while he is gone.  Reviewed current management.  Agreed to change Dilaudid to his scheduled regimen 3 times a day and monitor for sedation.  He is not aware of any significant contraindications as to why gabapentin was " "discontinued.    Allergies, and PMH/PSH reviewed in [facility] today.  REVIEW OF SYSTEMS:  4 point ROS including Respiratory, CV, GI and , other than that noted in the HPI,  is negative    Objective:   /59   Pulse 71   Temp 97.7  F (36.5  C)   Resp 17   Ht 1.626 m (5' 4\")   Wt 47.1 kg (103 lb 12.8 oz)   SpO2 96%   BMI 17.82 kg/m    Physical Exam  Vitals (Facility EMR) reviewed.   Constitutional:       General: She is not in acute distress.  HENT:      Head: Normocephalic and atraumatic.   Eyes:      General: No scleral icterus.  Cardiovascular:      Rate and Rhythm: Normal rate.   Pulmonary:      Effort: Pulmonary effort is normal.      Breath sounds: No wheezing.   Abdominal:      General: Bowel sounds are normal. There is no distension.      Comments: Abdomen is soft. Does c/o mild tenderness in RLQ   Skin:     General: Skin is warm and dry.      Findings: No rash.   Neurological:      Mental Status: She is alert.      Comments: Unable to cooperate with LE strength in exam   Psychiatric:      Comments: tearful           Recent labs in [facility] reviewed by me today.     MRI 8/16/21  IMPRESSION:  1. Significantly motion-limited exam.  2. Overall, no definite change compared to MRI of 5/19/2021.  3. Multiple vertebral body fractures in the lower thoracic and upper  to mid lumbar spine, as described.  4. Multilevel degenerative changes.  5. Moderate bordering on moderate-to-severe spinal canal stenosis with  mild flattening of the normal cord contour at the T12-L1 level.  Moderate spinal canal stenosis at L2-L3. These findings are unchanged.  6. Partially visualized cystic mass in the left adnexal region,  incompletely characterized. If it would change the patient's clinical  management, consider follow-up pelvic ultrasound.    Assessment/Plan:  (M54.9,  G89.29) Chronic back pain, unspecified back location, unspecified back pain laterality  (primary encounter diagnosis): History of multiple compression fractures " and waxing and waning chronic pain over the years.  Previously followed with neurosurgery and pain medicine.  History somewhat limited in the setting of dementia  - Continue scheduled Tylenol  - Adjust Dilaudid to 1 mg 3 times daily scheduled.  In review of facility EMR has primarily been receiving 1 dose a day  - Add low-dose gabapentin 100 mg at at bedtime.  Uptitrate as needed.  Patient is nonambulatory so low risk for falls but will need to monitor for increased confusion/oversedation      (E53.8) Vitamin B12 deficiency (non anemic): Historically on vitamin B supplementation.  Level elevated 5/2024 and appears cyanocobalamin was discontinued after hospital discharge.  CBC with increasing macrocytosis  - Restart cyanocobalamin mean at 500 g daily           (K59.03) Drug-induced constipation: Current complaints/fixations of feeling as though she needs to have a bowel movement.  Having regular bowel movements per facility documentation.  Aide indicates bowel movements are frequently soft  - Given will be on scheduled narcotics will increase senna to 1 tablet twice daily     A total 45 min were spent on this follow-up visit including review of previous records, medication reconciliation, coordinating with RN manager, aide, PT as well as separate phone call to patient's son in addition to writing orders and documenting.      MED REC REQUIRED  Post Medication Reconciliation Status: discharge medications reconciled and changed, per note/orders      Electronically signed by: Olivia Melara PA-C          Sincerely,        Olivia Melara PA-C

## 2024-08-14 NOTE — PROGRESS NOTES
"[facility] GERIATRICS    Chief Complaint   Patient presents with    Nursing Home Acute     HPI:  Daya Ignacio is a 89 year old  (10/11/1930), who is being seen today for an episodic care visit at: [facility] ([facility]) [[id number]].     Today's concern is:  Given patient's dementia with slums of 8/30 collaborative history from staff and family are necessary      Per PT. Had a good day in therapy last week but recently has been declining. C/o pain and making statements like just let me die    Per RN. Refused meds over the weekend. Now has been taking tylenol and dilaudid but declining others. Pain c/o in back and abdomen but sometimes says \"all over\"    Daya is evaluated in her room. Crying, asking for help to use the bathroom. Discussed with aide. Often c/o needing to have BM but goes daily to every other day and stools are soft. Daya does c/o pain today. In her back. Can't really elaborate.     Spent time reviewing patient's chart.  Previously followed with palliative care and neurosurgery approximately 18 months ago.  Has been on regimens of oxycodone, Dilaudid and Norco.  Additionally, appears she was on gabapentin in the past though not on recent admission.  Previous MRI 5/21 with several compression fractures as well as mild-severe spinal stenosis at T12-L1 and moderate spinal canal stenosis at L2-L3.    Separately, spoke with son Gael via phone.  He is quite concerned with regard to the amount of pain he sees his mom and will she is attempting to participate with therapy.  He is leaving for an overseas trip for 2 weeks and is concerned about her pain being managed while he is gone.  Reviewed current management.  Agreed to change Dilaudid to his scheduled regimen 3 times a day and monitor for sedation.  He is not aware of any significant contraindications as to why gabapentin was discontinued.    Allergies, and PMH/PSH reviewed in [facility] today.  REVIEW OF SYSTEMS:  4 point ROS including " "Respiratory, CV, GI and , other than that noted in the HPI,  is negative    Objective:   /59   Pulse 71   Temp 97.7  F (36.5  C)   Resp 17   Ht 1.626 m (5' 4\")   Wt 47.1 kg (103 lb 12.8 oz)   SpO2 96%   BMI 17.82 kg/m    Physical Exam  Vitals (Facility EMR) reviewed.   Constitutional:       General: She is not in acute distress.  HENT:      Head: Normocephalic and atraumatic.   Eyes:      General: No scleral icterus.  Cardiovascular:      Rate and Rhythm: Normal rate.   Pulmonary:      Effort: Pulmonary effort is normal.      Breath sounds: No wheezing.   Abdominal:      General: Bowel sounds are normal. There is no distension.      Comments: Abdomen is soft. Does c/o mild tenderness in RLQ   Skin:     General: Skin is warm and dry.      Findings: No rash.   Neurological:      Mental Status: She is alert.      Comments: Unable to cooperate with LE strength in exam   Psychiatric:      Comments: tearful           Recent labs in [facility] reviewed by me today.     MRI 8/16/21  IMPRESSION:  1. Significantly motion-limited exam.  2. Overall, no definite change compared to MRI of 5/19/2021.  3. Multiple vertebral body fractures in the lower thoracic and upper  to mid lumbar spine, as described.  4. Multilevel degenerative changes.  5. Moderate bordering on moderate-to-severe spinal canal stenosis with  mild flattening of the normal cord contour at the T12-L1 level.  Moderate spinal canal stenosis at L2-L3. These findings are unchanged.  6. Partially visualized cystic mass in the left adnexal region,  incompletely characterized. If it would change the patient's clinical  management, consider follow-up pelvic ultrasound.    Assessment/Plan:  (M54.9,  G89.29) Chronic back pain, unspecified back location, unspecified back pain laterality  (primary encounter diagnosis): History of multiple compression fractures and waxing and waning chronic pain over the years.  Previously followed with neurosurgery and pain " medicine.  History somewhat limited in the setting of dementia  - Continue scheduled Tylenol  - Adjust Dilaudid to 1 mg 3 times daily scheduled.  In review of facility EMR has primarily been receiving 1 dose a day  - Add low-dose gabapentin 100 mg at at bedtime.  Uptitrate as needed.  Patient is nonambulatory so low risk for falls but will need to monitor for increased confusion/oversedation      (E53.8) Vitamin B12 deficiency (non anemic): Historically on vitamin B supplementation.  Level elevated 5/2024 and appears cyanocobalamin was discontinued after hospital discharge.  CBC with increasing macrocytosis  - Restart cyanocobalamin mean at 500 g daily           (K59.03) Drug-induced constipation: Current complaints/fixations of feeling as though she needs to have a bowel movement.  Having regular bowel movements per facility documentation.  Aide indicates bowel movements are frequently soft  - Given will be on scheduled narcotics will increase senna to 1 tablet twice daily     A total 45 min were spent on this follow-up visit including review of previous records, medication reconciliation, coordinating with RN manager, aide, PT as well as separate phone call to patient's son in addition to writing orders and documenting.      MED REC REQUIRED  Post Medication Reconciliation Status: discharge medications reconciled and changed, per note/orders      Electronically signed by: Olivia Melara PA-C

## 2024-08-15 RX ORDER — GABAPENTIN 100 MG/1
100 CAPSULE ORAL AT BEDTIME
Status: SHIPPED
Start: 2024-08-15

## 2024-08-15 RX ORDER — HYDROMORPHONE HYDROCHLORIDE 2 MG/1
1 TABLET ORAL 3 TIMES DAILY
Qty: 90 TABLET | Refills: 0 | Status: SHIPPED | OUTPATIENT
Start: 2024-08-15 | End: 2024-09-05

## 2024-08-15 RX ORDER — SENNA AND DOCUSATE SODIUM 50; 8.6 MG/1; MG/1
1 TABLET, FILM COATED ORAL 2 TIMES DAILY
Status: SHIPPED
Start: 2024-08-15

## 2024-09-05 DIAGNOSIS — R52 PAIN: Primary | ICD-10-CM

## 2024-09-05 RX ORDER — HYDROMORPHONE HYDROCHLORIDE 2 MG/1
1 TABLET ORAL 3 TIMES DAILY
Qty: 90 TABLET | Refills: 0 | Status: SHIPPED | OUTPATIENT
Start: 2024-09-05

## 2024-09-17 ENCOUNTER — MEDICAL CORRESPONDENCE (OUTPATIENT)
Dept: HEALTH INFORMATION MANAGEMENT | Facility: CLINIC | Age: 89
End: 2024-09-17

## 2024-10-19 ENCOUNTER — DOCUMENTATION ONLY (OUTPATIENT)
Dept: GERIATRICS | Facility: CLINIC | Age: 89
End: 2024-10-19
Payer: COMMERCIAL

## 2024-10-20 RX ORDER — HYOSCYAMINE SULFATE 0.125 MG
0.12 TABLET,DISINTEGRATING ORAL EVERY 4 HOURS PRN
COMMUNITY

## 2024-10-20 RX ORDER — AMOXICILLIN 250 MG
1 CAPSULE ORAL DAILY
COMMUNITY

## 2024-10-20 RX ORDER — LORAZEPAM 0.5 MG/1
0.5 TABLET ORAL EVERY 4 HOURS PRN
COMMUNITY

## 2024-10-20 RX ORDER — BISACODYL 10 MG
10 SUPPOSITORY, RECTAL RECTAL DAILY PRN
COMMUNITY

## 2024-10-21 ENCOUNTER — NURSING HOME VISIT (OUTPATIENT)
Dept: GERIATRICS | Facility: CLINIC | Age: 89
End: 2024-10-21
Payer: MEDICARE

## 2024-10-21 VITALS
WEIGHT: 99.5 LBS | RESPIRATION RATE: 20 BRPM | BODY MASS INDEX: 16.99 KG/M2 | OXYGEN SATURATION: 91 % | SYSTOLIC BLOOD PRESSURE: 101 MMHG | HEART RATE: 85 BPM | TEMPERATURE: 98.2 F | DIASTOLIC BLOOD PRESSURE: 52 MMHG | HEIGHT: 64 IN

## 2024-10-21 DIAGNOSIS — D53.9 MACROCYTIC ANEMIA: ICD-10-CM

## 2024-10-21 DIAGNOSIS — I10 BENIGN ESSENTIAL HYPERTENSION: ICD-10-CM

## 2024-10-21 DIAGNOSIS — M79.18 MUSCULOSKELETAL PAIN: ICD-10-CM

## 2024-10-21 DIAGNOSIS — Z51.5 HOSPICE CARE PATIENT: Primary | ICD-10-CM

## 2024-10-21 DIAGNOSIS — R54 FRAILTY: ICD-10-CM

## 2024-10-21 DIAGNOSIS — F03.B3 MODERATE DEMENTIA WITH MOOD DISTURBANCE, UNSPECIFIED DEMENTIA TYPE (H): ICD-10-CM

## 2024-10-21 PROCEDURE — 99309 SBSQ NF CARE MODERATE MDM 30: CPT | Mod: GV | Performed by: INTERNAL MEDICINE

## 2024-10-21 NOTE — LETTER
" 10/21/2024      Daya Ignacio  [address]  [facility] [address]        [place] [facility]  PHYSICIAN NOTE    Chief Complaint   Patient presents with     custodial Regulatory       HPI:    Daya Ignacio is a 89 year old  (10/11/1930), who is being seen today for a federally mandated E/M visit at [facility]. Has now resided at the facility since May 2024 hospitalization with left distal tib/fib fracture s/p ORIF with blood transfusion and temporary O2. Throughout the summer had slow recovery; also C.diff, UTI and COVID infections with delirium. Has remained quite frail and transitioned to LTC wing. At times having more flares of chronic back pain. Enrolled in [facility] 9/17/24 with primary diagnosis of \"encephalopathy\".    Cognitive testing:  SLUMS: 8/30 (5/15/24)  Barthel: 5/100 (7/11/24)    No recent vitals other than weight recorded in chart due to comfort based approach to care. Weight remaining stable # range over the past few months. No longer on vitamins.    Daya is awake in her room sipping on some water when I came to visit.  Is almost lunchtime and she says that she normally has lunch in her room.  It is nice to note that she actually seems more at peace today than on review of previous chart notes.  Her Dilaudid was increased from 1 mg to a dose of 2 mg 3 times a day scheduled by the [facility] team on October 7.  She has not used any as needed Dilaudid since this time and I noted that she also has as needed Ativan available but not utilized since October 15.  She says that she is not having any pain and that she is sleeping okay.  She is glad that she has the water there as she was thirsty.  She does not feel any abdominal discomfort or that she is constipated.  She does not feel she needs anything at this time.  She offers up no complaints or questions and really seems quite content.  She had some flowers and pictures in " her room as her birthday was recently [birth year] 11 that I commented on that and she seemed to have some awareness of it.    Past Medical History:   Diagnosis Date     Anemia      Anxiety      Arthritis      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 (H) 04/16/2014     Chronic low back pain      Dementia (H)      Depression      h/o Clostridium difficile colitis 06/2019     h/o Mumps      Hypertension      Kidney stone      Lumbar compression fracture      Macular degeneration (senile) of retina, unspecified      NSTEMI (non-ST elevated myocardial infarction) (H) 05/28/2017     Recurrent UTI      Seasonal allergic rhinitis      Upper GI bleed 06/2019     Urinary tract infection due to extended-spectrum beta lactamase (ESBL)-producing Klebsiella 05/2019    resistant to Bactrim        CODE STATUS: DNR/I Hospice    ALLERGIES: Atorvastatin, Nitrofurantoin, Penicillins, Sulfa antibiotics, and Sulfasalazine    MEDICATIONS: Reviewed and updated in Epic according to facility MAR  Current Outpatient Medications   Medication Sig Dispense Refill     bisacodyl (DULCOLAX) 10 MG suppository Place 10 mg rectally daily as needed for constipation.       carvedilol (COREG) 12.5 MG tablet Take 12.5 mg by mouth 2 times daily (with meals)       HYDROmorphone (DILAUDID) 1 MG solutab Place 2 mg under the tongue 3 times daily. PLUS an additional 2 mg sublingual every 1 hour PRN pain per hospice       hyoscyamine 0.125 MG TBDP Take 0.125 mg by mouth every 4 hours as needed (secretions).       LORazepam (ATIVAN) 0.5 MG tablet Take 0.5 mg by mouth every 4 hours as needed for anxiety.       nystatin (MYCOSTATIN) 436142 UNIT/GM external cream Apply topically 2 times daily as needed (rash)       ondansetron (ZOFRAN) 4 MG tablet Take 4 mg by mouth every 8 hours as needed for nausea       polyethylene glycol (MIRALAX/GLYCOLAX) packet Take 17 g by mouth daily as needed for constipation       senna-docusate (SENOKOT-S/PERICOLACE) 8.6-50 MG tablet Take  "1 tablet by mouth daily. in AM, plus 2 tablets at HS       sertraline (ZOLOFT) 100 MG tablet Take 100 mg by mouth daily         ROS:  Limited secondary to cognitive impairment but today pt reports as above in HPI    Exam:  /52   Pulse 85   Temp 98.2  F (36.8  C)   Resp 20   Ht 1.626 m (5' 4\")   Wt 45.1 kg (99 lb 8 oz)   SpO2 91%   BMI 17.08 kg/m    Alert, sitting up in bed wearing hospital gown, appears stated age  No scleral icterus  Moist oral mucosa sipping on water  Heart tones regular with 3/6 systolic murmur  Lungs clear anteriorly, no cough  Abdomen soft, nontender, positive bowel sounds  No lower extremity edema  She has a pillow in between her ankles and bilateral soft boots in place  Seems overall content though vague historian    Lab/Diagnostic Data:    Most Recent 3 CBC's:  Recent Labs   Lab Test 08/12/24  0745 07/15/24  0654 07/01/24  0617   WBC 5.8 6.7 4.9   HGB 8.5* 8.3* 8.1*   * 108* 105*    212 224     Most Recent 3 BMP's:  Recent Labs   Lab Test 08/12/24  0745 07/15/24  0654 07/01/24  0617    138 136   POTASSIUM 3.9 3.8 4.1   CHLORIDE 105 103 102   CO2 26 27 26   BUN 30.2* 22.7 14.6   CR 0.44* 0.43* 0.45*   ANIONGAP 9 8 8   DAVE 10.0 9.5 9.4   GLC 86 82 89     Most Recent 2 LFT's:  Recent Labs   Lab Test 06/21/24  2256 05/28/24  0635   AST 8 10   ALT 7 5   ALKPHOS 93 179*   BILITOTAL 1.2 0.6     Most Recent TSH and T4:  Recent Labs   Lab Test 05/29/24  0736   TSH 1.89     Most Recent Hemoglobin A1c:  Recent Labs   Lab Test 01/13/17  1020   A1C 5.3       ASSESSMENT/PLAN:  Hospice care patient  Moderate dementia with mood disturbance, unspecified dementia type (H)  Musculoskeletal pain with chronic back pain  Frailty  No new orders today as she truly seems at ease / content today which is nice to see after the struggles of the past several months  Continue current medications for comfort with appreciation for facility staff as well as [facility] team and her " family  Remains on Sertraline which is appropriate for mood disorder in setting of dementia  Monitor for side effects of medications; is on a bowel regimen with her scheduled Dilaudid     Macrocytic anemia  H/o past anemia no longer on supplements/PPI - but focusing on comfort and no routine lab draws - does not seem pale on exam    Benign essential hypertension  GDR Coreg if need based on vitals/clinical status though vitals not routinely checked based on more comfort goals of care        Electronically signed by:  Gisela Meza,       Sincerely,        Gisela Meza, DO

## 2024-10-21 NOTE — PROGRESS NOTES
"[facility]  PHYSICIAN NOTE    Chief Complaint   Patient presents with    correction Regulatory       HPI:    Daya Ignacio is a 89 year old  (10/11/1930), who is being seen today for a federally mandated E/M visit at [facility]. Has now resided at the facility since May 2024 hospitalization with left distal tib/fib fracture s/p ORIF with blood transfusion and temporary O2. Throughout the summer had slow recovery; also C.diff, UTI and COVID infections with delirium. Has remained quite frail and transitioned to LTC wing. At times having more flares of chronic back pain. Enrolled in [facility] 9/17/24 with primary diagnosis of \"encephalopathy\".    Cognitive testing:  SLUMS: 8/30 (5/15/24)  Barthel: 5/100 (7/11/24)    No recent vitals other than weight recorded in chart due to comfort based approach to care. Weight remaining stable # range over the past few months. No longer on vitamins.    Daya is awake in her room sipping on some water when I came to visit.  Is almost lunchtime and she says that she normally has lunch in her room.  It is nice to note that she actually seems more at peace today than on review of previous chart notes.  Her Dilaudid was increased from 1 mg to a dose of 2 mg 3 times a day scheduled by the [facility] team on October 7.  She has not used any as needed Dilaudid since this time and I noted that she also has as needed Ativan available but not utilized since October 15.  She says that she is not having any pain and that she is sleeping okay.  She is glad that she has the water there as she was thirsty.  She does not feel any abdominal discomfort or that she is constipated.  She does not feel she needs anything at this time.  She offers up no complaints or questions and really seems quite content.  She had some flowers and pictures in her room as her birthday was recently [birth year] that I commented on that and she seemed to have " some awareness of it.    Past Medical History:   Diagnosis Date    Anemia     Anxiety     Arthritis     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 (H) 04/16/2014    Chronic low back pain     Dementia (H)     Depression     h/o Clostridium difficile colitis 06/2019    h/o Mumps     Hypertension     Kidney stone     Lumbar compression fracture     Macular degeneration (senile) of retina, unspecified     NSTEMI (non-ST elevated myocardial infarction) (H) 05/28/2017    Recurrent UTI     Seasonal allergic rhinitis     Upper GI bleed 06/2019    Urinary tract infection due to extended-spectrum beta lactamase (ESBL)-producing Klebsiella 05/2019    resistant to Bactrim        CODE STATUS: DNR/I Hospice    ALLERGIES: Atorvastatin, Nitrofurantoin, Penicillins, Sulfa antibiotics, and Sulfasalazine    MEDICATIONS: Reviewed and updated in Epic according to facility MAR  Current Outpatient Medications   Medication Sig Dispense Refill    bisacodyl (DULCOLAX) 10 MG suppository Place 10 mg rectally daily as needed for constipation.      carvedilol (COREG) 12.5 MG tablet Take 12.5 mg by mouth 2 times daily (with meals)      HYDROmorphone (DILAUDID) 1 MG solutab Place 2 mg under the tongue 3 times daily. PLUS an additional 2 mg sublingual every 1 hour PRN pain per hospice      hyoscyamine 0.125 MG TBDP Take 0.125 mg by mouth every 4 hours as needed (secretions).      LORazepam (ATIVAN) 0.5 MG tablet Take 0.5 mg by mouth every 4 hours as needed for anxiety.      nystatin (MYCOSTATIN) 249110 UNIT/GM external cream Apply topically 2 times daily as needed (rash)      ondansetron (ZOFRAN) 4 MG tablet Take 4 mg by mouth every 8 hours as needed for nausea      polyethylene glycol (MIRALAX/GLYCOLAX) packet Take 17 g by mouth daily as needed for constipation      senna-docusate (SENOKOT-S/PERICOLACE) 8.6-50 MG tablet Take 1 tablet by mouth daily. in AM, plus 2 tablets at HS      sertraline (ZOLOFT) 100 MG tablet Take 100 mg by mouth daily    "      ROS:  Limited secondary to cognitive impairment but today pt reports as above in HPI    Exam:  /52   Pulse 85   Temp 98.2  F (36.8  C)   Resp 20   Ht 1.626 m (5' 4\")   Wt 45.1 kg (99 lb 8 oz)   SpO2 91%   BMI 17.08 kg/m    Alert, sitting up in bed wearing hospital gown, appears stated age  No scleral icterus  Moist oral mucosa sipping on water  Heart tones regular with 3/6 systolic murmur  Lungs clear anteriorly, no cough  Abdomen soft, nontender, positive bowel sounds  No lower extremity edema  She has a pillow in between her ankles and bilateral soft boots in place  Seems overall content though vague historian    Lab/Diagnostic Data:    Most Recent 3 CBC's:  Recent Labs   Lab Test 08/12/24  0745 07/15/24  0654 07/01/24  0617   WBC 5.8 6.7 4.9   HGB 8.5* 8.3* 8.1*   * 108* 105*    212 224     Most Recent 3 BMP's:  Recent Labs   Lab Test 08/12/24  0745 07/15/24  0654 07/01/24  0617    138 136   POTASSIUM 3.9 3.8 4.1   CHLORIDE 105 103 102   CO2 26 27 26   BUN 30.2* 22.7 14.6   CR 0.44* 0.43* 0.45*   ANIONGAP 9 8 8   DAVE 10.0 9.5 9.4   GLC 86 82 89     Most Recent 2 LFT's:  Recent Labs   Lab Test 06/21/24  2256 05/28/24  0635   AST 8 10   ALT 7 5   ALKPHOS 93 179*   BILITOTAL 1.2 0.6     Most Recent TSH and T4:  Recent Labs   Lab Test 05/29/24  0736   TSH 1.89     Most Recent Hemoglobin A1c:  Recent Labs   Lab Test 01/13/17  1020   A1C 5.3       ASSESSMENT/PLAN:  Hospice care patient  Moderate dementia with mood disturbance, unspecified dementia type (H)  Musculoskeletal pain with chronic back pain  Frailty  No new orders today as she truly seems at ease / content today which is nice to see after the struggles of the past several months  Continue current medications for comfort with appreciation for facility staff as well as [facility] team and her family  Remains on Sertraline which is appropriate for mood disorder in setting of dementia  Monitor for side effects of " medications; is on a bowel regimen with her scheduled Dilaudid     Macrocytic anemia  H/o past anemia no longer on supplements/PPI - but focusing on comfort and no routine lab draws - does not seem pale on exam    Benign essential hypertension  GDR Coreg if need based on vitals/clinical status though vitals not routinely checked based on more comfort goals of care        Electronically signed by:  Gisela Meza,

## 2024-12-12 ENCOUNTER — NURSING HOME VISIT (OUTPATIENT)
Dept: GERIATRICS | Facility: CLINIC | Age: 89
End: 2024-12-12
Payer: COMMERCIAL

## 2024-12-12 VITALS
RESPIRATION RATE: 20 BRPM | BODY MASS INDEX: 16.61 KG/M2 | HEIGHT: 64 IN | DIASTOLIC BLOOD PRESSURE: 52 MMHG | OXYGEN SATURATION: 91 % | HEART RATE: 85 BPM | TEMPERATURE: 98.2 F | SYSTOLIC BLOOD PRESSURE: 101 MMHG | WEIGHT: 97.3 LBS

## 2024-12-12 DIAGNOSIS — G89.29 CHRONIC BACK PAIN, UNSPECIFIED BACK LOCATION, UNSPECIFIED BACK PAIN LATERALITY: ICD-10-CM

## 2024-12-12 DIAGNOSIS — L89.156 PRESSURE INJURY OF DEEP TISSUE OF SACRAL REGION: ICD-10-CM

## 2024-12-12 DIAGNOSIS — Z51.5 HOSPICE CARE PATIENT: Primary | ICD-10-CM

## 2024-12-12 DIAGNOSIS — M54.9 CHRONIC BACK PAIN, UNSPECIFIED BACK LOCATION, UNSPECIFIED BACK PAIN LATERALITY: ICD-10-CM

## 2024-12-12 DIAGNOSIS — F03.B3 MODERATE DEMENTIA WITH MOOD DISTURBANCE, UNSPECIFIED DEMENTIA TYPE (H): ICD-10-CM

## 2024-12-12 DIAGNOSIS — L89.523 PRESSURE INJURY OF LEFT ANKLE, STAGE 3 (H): ICD-10-CM

## 2024-12-12 RX ORDER — LORAZEPAM 0.5 MG/1
TABLET ORAL
Status: SHIPPED
Start: 2024-12-12

## 2024-12-12 NOTE — LETTER
12/12/2024      Daya Ignacio  [address]  [facility] [address]        [facility]  Chief Complaint   Patient presents with     Annual Comprehensive Nursing Home     [facility] Medical Record Number:  [id number]  Place of Service where encounter took place:  [facility] [[id number]]    HPI:    Daya Ignacio  is a 89 year old  (10/11/1930), who is being seen today for an annual comprehensive visit. PMH hypertension, chronic back pain, cognitive impairment, Cdiff s/p FMT with [facility] 7/2024, h/o left distal tib-fib fracture status post ORIF 5/2024 admitted to LTC August 2024 after prolonged TCU stay following tib-fib fracture. Enrolled in [facility] 10/2024    HPI information obtained from: facility chart records and facility staff.     Spoke with nursing staff.  Overall continues to decline.  Benefits from hospice support and scheduled meds.  Yesterday very sleepy but today more awake.  She has unfortunately developed DTI's to both her sacrum and left ankle.  Following closely with WOC RN and attempting offloading is much as possible    Daya is evaluated in her room.  Denies pain.  Asked for water which I assist her in drinking.  Breathing is stable.    Wt Readings from Last 4 Encounters:   12/12/24 44.1 kg (97 lb 4.8 oz)   10/21/24 45.1 kg (99 lb 8 oz)   08/14/24 47.1 kg (103 lb 12.8 oz)   08/07/24 47.1 kg (103 lb 12.8 oz)      ALLERGIES: Atorvastatin, Nitrofurantoin, Penicillins, Sulfa antibiotics, and Sulfasalazine  PAST MEDICAL HISTORY:   Past Medical History:   Diagnosis Date     Anemia      Anxiety      Arthritis      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 (H) 04/16/2014     Chronic low back pain      Dementia (H)      Depression      h/o Clostridium difficile colitis 06/2019     h/o Mumps      Hypertension      Kidney stone      Lumbar compression fracture      Macular degeneration (senile) of retina, unspecified      NSTEMI (non-ST  elevated myocardial infarction) (H) 05/28/2017     Recurrent UTI      Seasonal allergic rhinitis      Upper GI bleed 06/2019     Urinary tract infection due to extended-spectrum beta lactamase (ESBL)-producing Klebsiella 05/2019    resistant to Bactrim      PAST SURGICAL HISTORY:  has a past surgical history that includes Esophagoscopy, gastroscopy, duodenoscopy (EGD), combined (N/A, 06/11/2019); Cystoscopy (N/A, 08/01/2019); Tonsillectomy, adenoidectomy, combined; Cataract removal NOS (Bilateral); Open reduction internal fixation tibial plateau (Right, 08/28/2019); Combined Cystoscopy, Insert Stent Ureter(s) (Left, 05/06/2020); Extracorporeal shock wave lithotripsy (ESWL) (Left, 05/28/2020); Laser holmium lithotripsy ureter(s), insert stent, combined (Left, 10/30/2020); Open reduction internal fixation ankle (Left, 05/03/2024); Open reduction internal fixation rodding intramedullary tibia (Left, 05/03/2024); Open reduction internal fixation hip (Left); and Arthroplasty hip (Right).      Current Outpatient Medications:      bisacodyl (DULCOLAX) 10 MG suppository, Place 10 mg rectally daily as needed for constipation., Disp: , Rfl:      carvedilol (COREG) 12.5 MG tablet, Take 12.5 mg by mouth 2 times daily (with meals), Disp: , Rfl:      HYDROmorphone (DILAUDID) 1 MG solutab, Place 2 mg under the tongue 3 times daily. PLUS an additional 2 mg sublingual every 1 hour PRN pain per hospice, Disp: , Rfl:      hyoscyamine 0.125 MG TBDP, Take 0.125 mg by mouth every 4 hours as needed (secretions)., Disp: , Rfl:      LORazepam (ATIVAN) 0.5 MG tablet, Take 0.5 mg by mouth every 4 hours as needed for anxiety., Disp: , Rfl:      nystatin (MYCOSTATIN) [id number] UNIT/GM external cream, Apply topically 2 times daily as needed (rash), Disp: , Rfl:      ondansetron (ZOFRAN) 4 MG tablet, Take 4 mg by mouth every 8 hours as needed for nausea, Disp: , Rfl:      polyethylene glycol (MIRALAX/GLYCOLAX) packet, Take 17 g by mouth daily as  "needed for constipation, Disp: , Rfl:      senna-docusate (SENOKOT-S/PERICOLACE) 8.6-50 MG tablet, Take 1 tablet by mouth daily. in AM, plus 2 tablets at HS, Disp: , Rfl:      sertraline (ZOLOFT) 100 MG tablet, Take 100 mg by mouth daily, Disp: , Rfl:      MED REC REQUIRED  Post Medication Reconciliation Status: discharge medications reconciled and changed, per note/orders      Case Management:  I have reviewed the facility/SNF care plan/MDS, including the falls risk, nutrition and pain screening. I also reviewed the current immunizations, and preventive care.. Future cancer screening is not clinically indicated secondary to age/goals of care. Patient's desire to return to the community is not assessible due to cognitive impairment. Current Level of Care is appropriate.    Advance Directive Discussion:    I reviewed the current advanced directives as reflected in EPIC, the POLST and the facility chart, and verified the congruency of orders .  Team Discussion:  I communicated with the appropriate disciplines involved with the Plan of Care: Nursing  .   Patient's goal is: unobtainable secondary to cognitive impairment. Currently on hospice  Information reviewed: Medications, vital signs, orders, and nursing notes.    ROS:  Unobtainable secondary to cognitive impairment.     Vitals:  /52   Pulse 85   Temp 98.2  F (36.8  C)   Resp 20   Ht 1.626 m (5' 4\")   Wt 44.1 kg (97 lb 4.8 oz)   SpO2 91%   BMI 16.70 kg/m   Body mass index is 16.7 kg/m .  Exam:  Physical Exam  Vitals (Facility EMR) reviewed.   HENT:      Head: Normocephalic and atraumatic.   Eyes:      General: No scleral icterus.  Pulmonary:      Effort: Pulmonary effort is normal.   Musculoskeletal:      Right lower leg: No edema.      Left lower leg: No edema.   Skin:     General: Skin is warm and dry.      Findings: No rash.   Neurological:      Mental Status: She is alert. Mental status is at baseline.   Psychiatric:         Behavior: Behavior " normal.         Lab/Diagnostic data:   Patient is on hospice/palliative care and labs are not recommended    ASSESSMENT/PLAN  Hospice Care Patient  Moderate Dementia  Chronic Pain:   -Continue support with [facility]  -Continues on scheduled and as needed hydromorphone and lorazepam    DTI to Sacrum and Left Ankle: Secondary to poor nutritional status in the setting of end-of-life care in bed and mobility  - Continue local wound cares  - Facility WOC RN following  - Offloading      Electronically signed by:  Olivia Melara PA-C          Sincerely,        Olivia Melara PA-C

## 2024-12-12 NOTE — PROGRESS NOTES
[facility] GERIATRICS  Chief Complaint   Patient presents with    Annual Comprehensive Nursing Home     [facility] Medical Record Number:  [id number]  Place of Service where encounter took place:  [facility] ([facility]) [[id number]]    HPI:    Daya Ignacio  is a 89 year old  (10/11/1930), who is being seen today for an annual comprehensive visit. PMH hypertension, chronic back pain, cognitive impairment, Cdiff s/p FMT with VOWST 7/2024, h/o left distal tib-fib fracture status post ORIF 5/2024 admitted to LTC August 2024 after prolonged TCU stay following tib-fib fracture. Enrolled in [facility] 10/2024    HPI information obtained from: facility chart records and facility staff.     Spoke with nursing staff.  Overall continues to decline.  Benefits from hospice support and scheduled meds.  Yesterday very sleepy but today more awake.  She has unfortunately developed DTI's to both her sacrum and left ankle.  Following closely with WOC RN and attempting offloading is much as possible    Daya is evaluated in her room.  Denies pain.  Asked for water which I assist her in drinking.  Breathing is stable.    Wt Readings from Last 4 Encounters:   12/12/24 44.1 kg (97 lb 4.8 oz)   10/21/24 45.1 kg (99 lb 8 oz)   08/14/24 47.1 kg (103 lb 12.8 oz)   08/07/24 47.1 kg (103 lb 12.8 oz)      ALLERGIES: Atorvastatin, Nitrofurantoin, Penicillins, Sulfa antibiotics, and Sulfasalazine  PAST MEDICAL HISTORY:   Past Medical History:   Diagnosis Date    Anemia     Anxiety     Arthritis     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 (H) 04/16/2014    Chronic low back pain     Dementia (H)     Depression     h/o Clostridium difficile colitis 06/2019    h/o Mumps     Hypertension     Kidney stone     Lumbar compression fracture     Macular degeneration (senile) of retina, unspecified     NSTEMI (non-ST elevated myocardial infarction) (H) 05/28/2017    Recurrent UTI     Seasonal allergic rhinitis     Upper GI  bleed 06/2019    Urinary tract infection due to extended-spectrum beta lactamase (ESBL)-producing Klebsiella 05/2019    resistant to Bactrim      PAST SURGICAL HISTORY:  has a past surgical history that includes Esophagoscopy, gastroscopy, duodenoscopy (EGD), combined (N/A, 06/11/2019); Cystoscopy (N/A, 08/01/2019); Tonsillectomy, adenoidectomy, combined; Cataract removal NOS (Bilateral); Open reduction internal fixation tibial plateau (Right, 08/28/2019); Combined Cystoscopy, Insert Stent Ureter(s) (Left, 05/06/2020); Extracorporeal shock wave lithotripsy (ESWL) (Left, 05/28/2020); Laser holmium lithotripsy ureter(s), insert stent, combined (Left, 10/30/2020); Open reduction internal fixation ankle (Left, 05/03/2024); Open reduction internal fixation rodding intramedullary tibia (Left, 05/03/2024); Open reduction internal fixation hip (Left); and Arthroplasty hip (Right).      Current Outpatient Medications:     bisacodyl (DULCOLAX) 10 MG suppository, Place 10 mg rectally daily as needed for constipation., Disp: , Rfl:     carvedilol (COREG) 12.5 MG tablet, Take 12.5 mg by mouth 2 times daily (with meals), Disp: , Rfl:     HYDROmorphone (DILAUDID) 1 MG solutab, Place 2 mg under the tongue 3 times daily. PLUS an additional 2 mg sublingual every 1 hour PRN pain per hospice, Disp: , Rfl:     hyoscyamine 0.125 MG TBDP, Take 0.125 mg by mouth every 4 hours as needed (secretions)., Disp: , Rfl:     LORazepam (ATIVAN) 0.5 MG tablet, Take 0.5 mg by mouth every 4 hours as needed for anxiety., Disp: , Rfl:     nystatin (MYCOSTATIN) 250198 UNIT/GM external cream, Apply topically 2 times daily as needed (rash), Disp: , Rfl:     ondansetron (ZOFRAN) 4 MG tablet, Take 4 mg by mouth every 8 hours as needed for nausea, Disp: , Rfl:     polyethylene glycol (MIRALAX/GLYCOLAX) packet, Take 17 g by mouth daily as needed for constipation, Disp: , Rfl:     senna-docusate (SENOKOT-S/PERICOLACE) 8.6-50 MG tablet, Take 1 tablet by mouth  "daily. in AM, plus 2 tablets at HS, Disp: , Rfl:     sertraline (ZOLOFT) 100 MG tablet, Take 100 mg by mouth daily, Disp: , Rfl:      MED REC REQUIRED  Post Medication Reconciliation Status: discharge medications reconciled and changed, per note/orders      Case Management:  I have reviewed the facility/SNF care plan/MDS, including the falls risk, nutrition and pain screening. I also reviewed the current immunizations, and preventive care.. Future cancer screening is not clinically indicated secondary to age/goals of care. Patient's desire to return to the community is not assessible due to cognitive impairment. Current Level of Care is appropriate.    Advance Directive Discussion:    I reviewed the current advanced directives as reflected in EPIC, the POLST and the facility chart, and verified the congruency of orders .  Team Discussion:  I communicated with the appropriate disciplines involved with the Plan of Care: Nursing  .   Patient's goal is: unobtainable secondary to cognitive impairment. Currently on hospice  Information reviewed: Medications, vital signs, orders, and nursing notes.    ROS:  Unobtainable secondary to cognitive impairment.     Vitals:  /52   Pulse 85   Temp 98.2  F (36.8  C)   Resp 20   Ht 1.626 m (5' 4\")   Wt 44.1 kg (97 lb 4.8 oz)   SpO2 91%   BMI 16.70 kg/m   Body mass index is 16.7 kg/m .  Exam:  Physical Exam  Vitals (Facility EMR) reviewed.   HENT:      Head: Normocephalic and atraumatic.   Eyes:      General: No scleral icterus.  Pulmonary:      Effort: Pulmonary effort is normal.   Musculoskeletal:      Right lower leg: No edema.      Left lower leg: No edema.   Skin:     General: Skin is warm and dry.      Findings: No rash.   Neurological:      Mental Status: She is alert. Mental status is at baseline.   Psychiatric:         Behavior: Behavior normal.         Lab/Diagnostic data:   Patient is on hospice/palliative care and labs are not " recommended    ASSESSMENT/PLAN  Hospice Care Patient  Moderate Dementia  Chronic Pain:   -Continue support with [facility]  -Continues on scheduled and as needed hydromorphone and lorazepam    DTI to Sacrum and Left Ankle: Secondary to poor nutritional status in the setting of end-of-life care in bed and mobility  - Continue local wound cares  - Facility WOC RN following  - Offloading      Electronically signed by:  Olivia Melara PA-C

## 2025-02-03 ENCOUNTER — NURSING HOME VISIT (OUTPATIENT)
Dept: GERIATRICS | Facility: CLINIC | Age: OVER 89
End: 2025-02-03
Payer: COMMERCIAL

## 2025-02-03 VITALS
RESPIRATION RATE: 20 BRPM | OXYGEN SATURATION: 91 % | WEIGHT: 102.4 LBS | BODY MASS INDEX: 17.48 KG/M2 | HEIGHT: 64 IN | DIASTOLIC BLOOD PRESSURE: 52 MMHG | HEART RATE: 85 BPM | SYSTOLIC BLOOD PRESSURE: 101 MMHG | TEMPERATURE: 98.2 F

## 2025-02-03 DIAGNOSIS — G89.29 CHRONIC BACK PAIN, UNSPECIFIED BACK LOCATION, UNSPECIFIED BACK PAIN LATERALITY: ICD-10-CM

## 2025-02-03 DIAGNOSIS — L89.156 PRESSURE INJURY OF DEEP TISSUE OF SACRAL REGION: ICD-10-CM

## 2025-02-03 DIAGNOSIS — Z51.5 HOSPICE CARE PATIENT: Primary | ICD-10-CM

## 2025-02-03 DIAGNOSIS — F03.B3 MODERATE DEMENTIA WITH MOOD DISTURBANCE, UNSPECIFIED DEMENTIA TYPE (H): ICD-10-CM

## 2025-02-03 DIAGNOSIS — R63.4 WEIGHT LOSS: ICD-10-CM

## 2025-02-03 DIAGNOSIS — M54.9 CHRONIC BACK PAIN, UNSPECIFIED BACK LOCATION, UNSPECIFIED BACK PAIN LATERALITY: ICD-10-CM

## 2025-02-03 PROCEDURE — 99309 SBSQ NF CARE MODERATE MDM 30: CPT | Performed by: INTERNAL MEDICINE

## 2025-02-03 NOTE — PROGRESS NOTES
"[facility]  PHYSICIAN NOTE    Chief Complaint   Patient presents with    assisted Regulatory       HPI:    Daya Ignacio is a 89 year old  (10/11/1930), who is being seen today for a federally mandated E/M visit at [facility].  Has now resided at the facility since May 2024 hospitalization with left distal tib/fib fracture s/p ORIF with blood transfusion and temporary O2. Throughout the summer had slow recovery; also C.diff, UTI and COVID infections with delirium. Has remained quite frail and transitioned to LTC wing. At times having more flares of chronic back pain. Enrolled in [facility] 9/17/24 with primary diagnosis of \"encephalopathy\".     Weight has fluctuated the past few months around 100# range (#) which is stable.    Daya is resting in her room comfortably.  I say her name and she awakes to voice.  Is happy to have a visitor and does not mind me waking her up.  Does not recognize a photo in her room but does recognize the names of some of her family who had sent her a card and enjoys hearing it.  Denies pain.  Has no acute care concerns.  Does continue on hospice with medications reviewed as noted below today.    Past Medical History:   Diagnosis Date    Anemia     Anxiety     Arthritis     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 (H) 04/16/2014    Chronic low back pain     Dementia (H)     Depression     h/o Clostridium difficile colitis 06/2019    h/o Mumps     Hypertension     Kidney stone     Lumbar compression fracture     Macular degeneration (senile) of retina, unspecified     NSTEMI (non-ST elevated myocardial infarction) (H) 05/28/2017    Recurrent UTI     Seasonal allergic rhinitis     Upper GI bleed 06/2019    Urinary tract infection due to extended-spectrum beta lactamase (ESBL)-producing Klebsiella 05/2019    resistant to Bactrim        CODE STATUS: DNR/I Hospice    ALLERGIES: Atorvastatin, Nitrofurantoin, Penicillins, Sulfa " "antibiotics, and Sulfasalazine    MEDICATIONS: Reviewed and updated in Epic according to facility MAR  Current Outpatient Medications   Medication Sig Dispense Refill    bisacodyl (DULCOLAX) 10 MG suppository Place 10 mg rectally every 3 days.      LORazepam (ATIVAN) 0.5 MG tablet Take 0.5 mg by mouth 2 times daily.      bisacodyl (DULCOLAX) 10 MG suppository Place 10 mg rectally daily as needed for constipation.      HYDROmorphone (DILAUDID) 1 MG solutab Place 2 mg under the tongue every 6 hours. PLUS an additional 2 mg sublingual every 1 hour PRN pain per hospice      hyoscyamine 0.125 MG TBDP Take 0.125 mg by mouth every 4 hours as needed (secretions).      nystatin (MYCOSTATIN) 412259 UNIT/GM external cream Apply topically 2 times daily as needed (rash)      ondansetron (ZOFRAN) 4 MG tablet Take 4 mg by mouth every 8 hours as needed for nausea      polyethylene glycol (MIRALAX/GLYCOLAX) packet Take 17 g by mouth daily as needed for constipation      senna-docusate (SENOKOT-S/PERICOLACE) 8.6-50 MG tablet Take 1 tablet by mouth daily. in AM, plus 2 tablets at HS      sertraline (ZOLOFT) 100 MG tablet Take 125 mg by mouth daily. Takes 100 mg + 25 mg tablets         ROS:  Limited secondary to cognitive impairment but today pt reports as above in HPI    Exam:  /52   Pulse 85   Temp 98.2  F (36.8  C)   Resp 20   Ht 1.626 m (5' 4\")   Wt 46.4 kg (102 lb 6.4 oz)   SpO2 (!) 91%   BMI 17.58 kg/m    Resting comfortably in bed in no acute distress, awakes to voice and is pleasant and happy to have a conversation  Moist oral mucosa  Breathing nonlabored on room air, no cough  Abdomen soft, nontender, positive bowel sounds, no guarding or rebound  No lower extremity edema  Bilateral heels in soft protection boots  Cheerful disposition though vague historian    Lab/Diagnostic Data:    No routine labs on hospice    ASSESSMENT/PLAN:  Hospice care patient  Chronic pain - h/o chronic back pain  Weight loss  Continue " scheduled medications including Dilaudid and Ativan; noted that hospice increased the frequency of her Dilaudid slightly for better overnight pain control coverage in late January d/t reports of back pain at that time  Today she seems comfortable  She is on a bowel program as well  Low but stable weight; noted doesn't have much appetite  Abdomen is soft, non-tender on exam today  Expected weight loss based on advancing comorbidites and comfort based approach to care    Moderate dementia with mood disturbance, unspecified dementia type (H)  Continues on sertraline now 125 mg daily (increased from previously 100 mg daily in November)  Has BID scheduled Lorazepam  Mood seems euthymic and was happy to have a visitor today    History of stage II sacrum ulcer  Spends a lot of time in bed with frailty and comfort based approach to care  Nursing cares appreciated with off loading, repositioning, HOB < 30 degrees unless eating to reduce shear  As of January 23 the nursing note indicated healing of the stage II pressure injury      Electronically signed by:  Gisela Meza DO

## 2025-02-03 NOTE — LETTER
" 2/3/2025      Daya Ignacio  [address]  [facility] [address]        [facility]  PHYSICIAN NOTE    Chief Complaint   Patient presents with     FDC Regulatory       HPI:    Daya Ignacio is a 89 year old  (10/11/1930), who is being seen today for a federally mandated E/M visit at [facility].  Has now resided at the facility since May 2024 hospitalization with left distal tib/fib fracture s/p ORIF with blood transfusion and temporary O2. Throughout the summer had slow recovery; also C.diff, UTI and COVID infections with delirium. Has remained quite frail and transitioned to LTC wing. At times having more flares of chronic back pain. Enrolled in [facility] 9/17/24 with primary diagnosis of \"encephalopathy\".     Weight has fluctuated the past few months around 100# range (#) which is stable.    Daya is resting in her room comfortably.  I say her name and she awakes to voice.  Is happy to have a visitor and does not mind me waking her up.  Does not recognize a photo in her room but does recognize the names of some of her family who had sent her a card and enjoys hearing it.  Denies pain.  Has no acute care concerns.  Does continue on hospice with medications reviewed as noted below today.    Past Medical History:   Diagnosis Date     Anemia      Anxiety      Arthritis      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 (H) 04/16/2014     Chronic low back pain      Dementia (H)      Depression      h/o Clostridium difficile colitis 06/2019     h/o Mumps      Hypertension      Kidney stone      Lumbar compression fracture      Macular degeneration (senile) of retina, unspecified      NSTEMI (non-ST elevated myocardial infarction) (H) 05/28/2017     Recurrent UTI      Seasonal allergic rhinitis      Upper GI bleed 06/2019     Urinary tract infection due to extended-spectrum beta lactamase (ESBL)-producing Klebsiella 05/2019    resistant " "to Bactrim        CODE STATUS: DNR/I Hospice    ALLERGIES: Atorvastatin, Nitrofurantoin, Penicillins, Sulfa antibiotics, and Sulfasalazine    MEDICATIONS: Reviewed and updated in [facility] according to facility MAR  Current Outpatient Medications   Medication Sig Dispense Refill     bisacodyl (DULCOLAX) 10 MG suppository Place 10 mg rectally every 3 days.       LORazepam (ATIVAN) 0.5 MG tablet Take 0.5 mg by mouth 2 times daily.       bisacodyl (DULCOLAX) 10 MG suppository Place 10 mg rectally daily as needed for constipation.       HYDROmorphone (DILAUDID) 1 MG solutab Place 2 mg under the tongue every 6 hours. PLUS an additional 2 mg sublingual every 1 hour PRN pain per hospice       hyoscyamine 0.125 MG TBDP Take 0.125 mg by mouth every 4 hours as needed (secretions).       nystatin (MYCOSTATIN) [id number] UNIT/GM external cream Apply topically 2 times daily as needed (rash)       ondansetron (ZOFRAN) 4 MG tablet Take 4 mg by mouth every 8 hours as needed for nausea       polyethylene glycol (MIRALAX/GLYCOLAX) packet Take 17 g by mouth daily as needed for constipation       senna-docusate (SENOKOT-S/PERICOLACE) 8.6-50 MG tablet Take 1 tablet by mouth daily. in AM, plus 2 tablets at HS       sertraline (ZOLOFT) 100 MG tablet Take 125 mg by mouth daily. Takes 100 mg + 25 mg tablets         ROS:  Limited secondary to cognitive impairment but today pt reports as above in HPI    Exam:  /52   Pulse 85   Temp 98.2  F (36.8  C)   Resp 20   Ht 1.626 m (5' 4\")   Wt 46.4 kg (102 lb 6.4 oz)   SpO2 (!) 91%   BMI 17.58 kg/m    Resting comfortably in bed in no acute distress, awakes to voice and is pleasant and happy to have a conversation  Moist oral mucosa  Breathing nonlabored on room air, no cough  Abdomen soft, nontender, positive bowel sounds, no guarding or rebound  No lower extremity edema  Bilateral heels in soft protection boots  Cheerful disposition though vague historian    Lab/Diagnostic Data:    No routine " labs on hospice    ASSESSMENT/PLAN:  Hospice care patient  Chronic pain - h/o chronic back pain  Weight loss  Continue scheduled medications including Dilaudid and Ativan; noted that hospice increased the frequency of her Dilaudid slightly for better overnight pain control coverage in late January d/t reports of back pain at that time  Today she seems comfortable  She is on a bowel program as well  Low but stable weight; noted doesn't have much appetite  Abdomen is soft, non-tender on exam today  Expected weight loss based on advancing comorbidites and comfort based approach to care    Moderate dementia with mood disturbance, unspecified dementia type (H)  Continues on sertraline now 125 mg daily (increased from previously 100 mg daily in November)  Has BID scheduled Lorazepam  Mood seems euthymic and was happy to have a visitor today    History of stage II sacrum ulcer  Spends a lot of time in bed with frailty and comfort based approach to care  Nursing cares appreciated with off loading, repositioning, HOB < 30 degrees unless eating to reduce shear  As of January 23 the nursing note indicated healing of the stage II pressure injury      Electronically signed by:  Gisela Meza DO      Sincerely,        Gisela Meza DO    Electronically signed

## 2025-02-04 RX ORDER — LORAZEPAM 0.5 MG/1
0.5 TABLET ORAL 2 TIMES DAILY
COMMUNITY

## 2025-02-04 RX ORDER — BISACODYL 10 MG
10 SUPPOSITORY, RECTAL RECTAL
COMMUNITY

## 2025-04-01 NOTE — TELEPHONE ENCOUNTER
Controlled Substance Refill Request for T3 and Gabapentin  Problem List Complete:  No     PROVIDER TO CONSIDER COMPLETION OF PROBLEM LIST AND OVERVIEW/CONTROLLED SUBSTANCE AGREEMENT    Last Written Prescription Date:  Cristi=07/08/16; T3=10/7/16  Last Fill Quantity: Gabapentin=180; W8=179   # refills:garry=1, T3 =0    Last Office Visit with [facility] primary care provider: 7/8/16    Future Office visit:   Next 5 appointments (look out 90 days)     Jan 13, 2017 10:00 AM   Pre-Op physical with Marcy Soares PA-C   [facility]    [id number] [place] [id number] [phone]                  Controlled substance agreement on file: No.     Processing:  Staff will hand deliver Rx to on-site pharmacy   checked in past 6 months?  Yes 6/2/16      Elma Yates, Pharmacy [facility]   81.6

## (undated) DEVICE — BAG CLEAR TRASH 1.3M 39X33" P4040C

## (undated) DEVICE — CAST PADDING 6" STERILE 9046S

## (undated) DEVICE — GUIDEWIRE URO STR STIFF .035"X150CM NITINOL 150NSS35

## (undated) DEVICE — TOURNIQUET SGL  BLADDER 30"X4" BLUE 5921030135

## (undated) DEVICE — ESU GROUND PAD ADULT W/CORD E7507

## (undated) DEVICE — PREP CHLORAPREP 26ML TINTED HI-LITE ORANGE 930815

## (undated) DEVICE — IMP SCR SYN CORTEX 3.5X36MM SELF TAP SS 204.836: Type: IMPLANTABLE DEVICE | Site: TIBIA | Status: NON-FUNCTIONAL

## (undated) DEVICE — GLOVE PROTEXIS POWDER FREE SMT 7.5  2D72PT75X

## (undated) DEVICE — LINEN FULL SHEET 5511

## (undated) DEVICE — LINEN HALF SHEET 5512

## (undated) DEVICE — LINEN DRAPE 54X72" 5467

## (undated) DEVICE — GLOVE BIOGEL PI SZ 7.5 40875

## (undated) DEVICE — PACK CYSTO CUSTOM RIDGES

## (undated) DEVICE — PREP POVIDONE IODINE SOLUTION 10% 120ML

## (undated) DEVICE — DRSG GAUZE 4X4" TRAY

## (undated) DEVICE — SU DERMABOND MINI DHVM12

## (undated) DEVICE — SLEEVE HLD 8-11MM T2 NL ELC INS STRL

## (undated) DEVICE — CAST BUCKET

## (undated) DEVICE — DRSG ABDOMINAL 07 1/2X8" 7197D

## (undated) DEVICE — Device

## (undated) DEVICE — DRSG XEROFORM 1X8"

## (undated) DEVICE — PACK TOTAL KNEE BOXED LATEX FREE PO15TKFCT

## (undated) DEVICE — SU MONOCRYL 3-0 PS-2 27" Y427H

## (undated) DEVICE — SU MONOCRYL 2-0 CT-1 36" UND Y945H

## (undated) DEVICE — SOL WATER IRRIG 3000ML BAG 2B7117

## (undated) DEVICE — SUTURE MONOCRYL+ 2-0 CT-1 36" UNDYED MCP945H

## (undated) DEVICE — SOL WATER IRRIG 1000ML BOTTLE 2F7114

## (undated) DEVICE — DRILL SRG 360MM LCK 4.2MM STRL

## (undated) DEVICE — CAST PADDING 4" STERILE 9044S

## (undated) DEVICE — REAMER SHAFT MODIFIED TRINKLE 8X510MM 0227-8510S

## (undated) DEVICE — LINEN ORTHO ACL PACK 5447

## (undated) DEVICE — DRILL BIT QUICK COUPLING 2.5X110MM GOLD 310.25

## (undated) DEVICE — SUTURE MONOCRYL+ 3-0 PS-1 27" UNDYED MCP936H

## (undated) DEVICE — CAST PADDING 4" UNSTERILE 9044

## (undated) DEVICE — SU ETHILON 3-0 PS-2 18" 1669H

## (undated) DEVICE — KIT ENDO TURNOVER/PROCEDURE W/CLEAN A SCOPE LINERS 103888

## (undated) DEVICE — DRAPE C-ARMOR 5 SIDED 5523

## (undated) DEVICE — SU VICRYL 1 CT-1 27" J341H

## (undated) DEVICE — COVER FOOTSWITCH W/CINCH 20X24" 923267

## (undated) DEVICE — CORTICAL OPENER

## (undated) DEVICE — BNDG ELASTIC 4" DBL LENGTH UNSTERILE 6611-14

## (undated) DEVICE — DRAPE IOBAN INCISE 23X17" 6650EZ

## (undated) DEVICE — TOURNIQUET CUFF 30" REPRO BLUE 60-7070-105

## (undated) DEVICE — GLOVE BIOGEL PI MICRO SZ 6.0 48560

## (undated) DEVICE — GLOVE BIOGEL PI MICRO INDICATOR UNDERGLOVE SZ 6.5 48965

## (undated) DEVICE — TUBING IRRIG TUR Y TYPE 96" LF 6543-01

## (undated) DEVICE — PREP POVIDONE IODINE SCRUB 7.5% 120ML

## (undated) DEVICE — DRILL BIT SYN 2.8MM CALIBRATED 324.214

## (undated) DEVICE — SU VICRYL 0 CT-1 27" J340H

## (undated) DEVICE — RAD RX ISOVUE 300 (50ML) 61% IOPAMIDOL CHARGE PER ML

## (undated) DEVICE — SU VICRYL 2-0 CT-1 27" UND J259H

## (undated) DEVICE — BNDG ELASTIC 6" DBL LENGTH UNSTERILE 6611-16

## (undated) DEVICE — GLOVE BIOGEL PI MICRO INDICATOR UNDERGLOVE SZ 7.5 48975

## (undated) DEVICE — SU ETHILON 3-0 FS-1 18" 669H

## (undated) DEVICE — DRAPE C-ARM 60X42" 1013

## (undated) DEVICE — PAD CHUX UNDERPAD 30X36" P3036C

## (undated) DEVICE — GLOVE PROTEXIS BLUE W/NEU-THERA 8.0  2D73EB80

## (undated) DEVICE — ENDO SNARE HEXAGONAL ISNARE 2.5X4.0CM W/25GA NDL

## (undated) DEVICE — CATH URETERAL FLEX TIP TIGERTAIL 06FRX70CM 139006

## (undated) DEVICE — GLOVE PROTEXIS POWDER FREE 8.0 ORTHOPEDIC 2D73ET80

## (undated) DEVICE — SPONGE LAP 18X18" X8435

## (undated) DEVICE — BIT DRL 130MM 4.2MM FRHD STRL LF

## (undated) DEVICE — BASKET RETRIEVAL 1.9FRX120CM ESCAPE NTNL 4 WIRE 390-201

## (undated) DEVICE — BRUSH SURGICAL SCRUB W/4% CHG SOL 25ML 371073

## (undated) DEVICE — SUCTION CANISTER MEDIVAC LINER 3000ML W/LID 65651-530

## (undated) DEVICE — PREP TECHNI-CARE CHLOROXYLENOL 3% 4OZ BOTTLE C222-4ZWO

## (undated) DEVICE — SOL NACL 0.9% IRRIG 3000ML BAG 2B7477

## (undated) DEVICE — SU ETHIBOND 1 CT-1 30" X425H

## (undated) DEVICE — IMM KNEE 20"

## (undated) DEVICE — IMP SCR SYN CORTEX 3.5X32MM SELF TAP SS 204.832: Type: IMPLANTABLE DEVICE | Site: TIBIA | Status: NON-FUNCTIONAL

## (undated) DEVICE — LASER FIBER HOLMIUM 365UM HTB-365

## (undated) RX ORDER — PROPOFOL 10 MG/ML
INJECTION, EMULSION INTRAVENOUS
Status: DISPENSED
Start: 2019-08-01

## (undated) RX ORDER — NEOSTIGMINE METHYLSULFATE 1 MG/ML
VIAL (ML) INJECTION
Status: DISPENSED
Start: 2019-08-28

## (undated) RX ORDER — CEFAZOLIN SODIUM 2 G/100ML
INJECTION, SOLUTION INTRAVENOUS
Status: DISPENSED
Start: 2019-08-28

## (undated) RX ORDER — ONDANSETRON 2 MG/ML
INJECTION INTRAMUSCULAR; INTRAVENOUS
Status: DISPENSED
Start: 2020-05-28

## (undated) RX ORDER — FENTANYL CITRATE-0.9 % NACL/PF 10 MCG/ML
PLASTIC BAG, INJECTION (ML) INTRAVENOUS
Status: DISPENSED
Start: 2020-10-30

## (undated) RX ORDER — FENTANYL CITRATE 50 UG/ML
INJECTION, SOLUTION INTRAMUSCULAR; INTRAVENOUS
Status: DISPENSED
Start: 2019-08-28

## (undated) RX ORDER — CEFAZOLIN SODIUM/WATER 2 G/20 ML
SYRINGE (ML) INTRAVENOUS
Status: DISPENSED
Start: 2024-05-03

## (undated) RX ORDER — DEXAMETHASONE SODIUM PHOSPHATE 4 MG/ML
INJECTION, SOLUTION INTRA-ARTICULAR; INTRALESIONAL; INTRAMUSCULAR; INTRAVENOUS; SOFT TISSUE
Status: DISPENSED
Start: 2020-05-28

## (undated) RX ORDER — EPHEDRINE SULFATE 50 MG/ML
INJECTION, SOLUTION INTRAMUSCULAR; INTRAVENOUS; SUBCUTANEOUS
Status: DISPENSED
Start: 2019-08-28

## (undated) RX ORDER — ONDANSETRON 2 MG/ML
INJECTION INTRAMUSCULAR; INTRAVENOUS
Status: DISPENSED
Start: 2019-08-28

## (undated) RX ORDER — PROPOFOL 10 MG/ML
INJECTION, EMULSION INTRAVENOUS
Status: DISPENSED
Start: 2024-05-03

## (undated) RX ORDER — FENTANYL CITRATE 50 UG/ML
INJECTION, SOLUTION INTRAMUSCULAR; INTRAVENOUS
Status: DISPENSED
Start: 2020-10-30

## (undated) RX ORDER — LIDOCAINE HYDROCHLORIDE 20 MG/ML
INJECTION, SOLUTION EPIDURAL; INFILTRATION; INTRACAUDAL; PERINEURAL
Status: DISPENSED
Start: 2020-05-28

## (undated) RX ORDER — BUPIVACAINE HYDROCHLORIDE 5 MG/ML
INJECTION, SOLUTION EPIDURAL; INTRACAUDAL
Status: DISPENSED
Start: 2019-08-28

## (undated) RX ORDER — FENTANYL CITRATE 50 UG/ML
INJECTION, SOLUTION INTRAMUSCULAR; INTRAVENOUS
Status: DISPENSED
Start: 2020-05-28

## (undated) RX ORDER — DEXAMETHASONE SODIUM PHOSPHATE 4 MG/ML
INJECTION, SOLUTION INTRA-ARTICULAR; INTRALESIONAL; INTRAMUSCULAR; INTRAVENOUS; SOFT TISSUE
Status: DISPENSED
Start: 2024-05-03

## (undated) RX ORDER — FENTANYL CITRATE 50 UG/ML
INJECTION, SOLUTION INTRAMUSCULAR; INTRAVENOUS
Status: DISPENSED
Start: 2019-06-11

## (undated) RX ORDER — DEXAMETHASONE SODIUM PHOSPHATE 4 MG/ML
INJECTION, SOLUTION INTRA-ARTICULAR; INTRALESIONAL; INTRAMUSCULAR; INTRAVENOUS; SOFT TISSUE
Status: DISPENSED
Start: 2019-08-28

## (undated) RX ORDER — GINSENG 100 MG
CAPSULE ORAL
Status: DISPENSED
Start: 2020-10-30

## (undated) RX ORDER — GLYCOPYRROLATE 0.2 MG/ML
INJECTION INTRAMUSCULAR; INTRAVENOUS
Status: DISPENSED
Start: 2019-08-28

## (undated) RX ORDER — PROPOFOL 10 MG/ML
INJECTION, EMULSION INTRAVENOUS
Status: DISPENSED
Start: 2019-08-28

## (undated) RX ORDER — PROPOFOL 10 MG/ML
INJECTION, EMULSION INTRAVENOUS
Status: DISPENSED
Start: 2020-05-28

## (undated) RX ORDER — LIDOCAINE HYDROCHLORIDE 10 MG/ML
INJECTION, SOLUTION EPIDURAL; INFILTRATION; INTRACAUDAL; PERINEURAL
Status: DISPENSED
Start: 2019-08-28

## (undated) RX ORDER — FENTANYL CITRATE 50 UG/ML
INJECTION, SOLUTION INTRAMUSCULAR; INTRAVENOUS
Status: DISPENSED
Start: 2019-08-01

## (undated) RX ORDER — LIDOCAINE HYDROCHLORIDE 10 MG/ML
INJECTION, SOLUTION EPIDURAL; INFILTRATION; INTRACAUDAL; PERINEURAL
Status: DISPENSED
Start: 2024-05-03

## (undated) RX ORDER — LABETALOL 20 MG/4 ML (5 MG/ML) INTRAVENOUS SYRINGE
Status: DISPENSED
Start: 2020-10-30

## (undated) RX ORDER — ONDANSETRON 2 MG/ML
INJECTION INTRAMUSCULAR; INTRAVENOUS
Status: DISPENSED
Start: 2024-05-03

## (undated) RX ORDER — ONDANSETRON 2 MG/ML
INJECTION INTRAMUSCULAR; INTRAVENOUS
Status: DISPENSED
Start: 2020-10-30

## (undated) RX ORDER — FENTANYL CITRATE-0.9 % NACL/PF 10 MCG/ML
PLASTIC BAG, INJECTION (ML) INTRAVENOUS
Status: DISPENSED
Start: 2024-05-03

## (undated) RX ORDER — GLYCOPYRROLATE 0.2 MG/ML
INJECTION, SOLUTION INTRAMUSCULAR; INTRAVENOUS
Status: DISPENSED
Start: 2024-05-03

## (undated) RX ORDER — ATROPA BELLADONNA AND OPIUM 16.2; 3 MG/1; MG/1
SUPPOSITORY RECTAL
Status: DISPENSED
Start: 2020-10-30

## (undated) RX ORDER — FENTANYL CITRATE 50 UG/ML
INJECTION, SOLUTION INTRAMUSCULAR; INTRAVENOUS
Status: DISPENSED
Start: 2024-05-03

## (undated) RX ORDER — CEFAZOLIN SODIUM 2 G/100ML
INJECTION, SOLUTION INTRAVENOUS
Status: DISPENSED
Start: 2020-10-30

## (undated) RX ORDER — CEFAZOLIN SODIUM 2 G/100ML
INJECTION, SOLUTION INTRAVENOUS
Status: DISPENSED
Start: 2020-05-28

## (undated) RX ORDER — FENTANYL CITRATE 50 UG/ML
INJECTION, SOLUTION INTRAMUSCULAR; INTRAVENOUS
Status: DISPENSED
Start: 2020-05-06

## (undated) RX ORDER — TRANEXAMIC ACID 10 MG/ML
INJECTION, SOLUTION INTRAVENOUS
Status: DISPENSED
Start: 2024-05-03